# Patient Record
Sex: MALE | Race: WHITE | NOT HISPANIC OR LATINO | Employment: OTHER | URBAN - METROPOLITAN AREA
[De-identification: names, ages, dates, MRNs, and addresses within clinical notes are randomized per-mention and may not be internally consistent; named-entity substitution may affect disease eponyms.]

---

## 2017-01-05 ENCOUNTER — OFFICE VISIT (OUTPATIENT)
Dept: FAMILY MEDICINE CLINIC | Facility: CLINIC | Age: 72
End: 2017-01-05

## 2017-01-05 VITALS
DIASTOLIC BLOOD PRESSURE: 62 MMHG | WEIGHT: 194 LBS | BODY MASS INDEX: 29.4 KG/M2 | TEMPERATURE: 97.6 F | SYSTOLIC BLOOD PRESSURE: 138 MMHG | HEIGHT: 68 IN | OXYGEN SATURATION: 93 % | HEART RATE: 59 BPM | RESPIRATION RATE: 16 BRPM

## 2017-01-05 DIAGNOSIS — E78.2 MIXED HYPERLIPIDEMIA: ICD-10-CM

## 2017-01-05 DIAGNOSIS — R39.15 URGENCY OF URINATION: Primary | ICD-10-CM

## 2017-01-05 DIAGNOSIS — Z12.5 SCREENING FOR PROSTATE CANCER: ICD-10-CM

## 2017-01-05 DIAGNOSIS — M51.37 DDD (DEGENERATIVE DISC DISEASE), LUMBOSACRAL: ICD-10-CM

## 2017-01-05 PROCEDURE — 99214 OFFICE O/P EST MOD 30 MIN: CPT | Performed by: FAMILY MEDICINE

## 2017-01-05 RX ORDER — HYDROCODONE BITARTRATE AND ACETAMINOPHEN 5; 300 MG/1; MG/1
1 TABLET ORAL 2 TIMES DAILY
Qty: 60 TABLET | Refills: 0 | Status: SHIPPED | OUTPATIENT
Start: 2017-01-05 | End: 2017-02-07 | Stop reason: SDUPTHER

## 2017-01-05 RX ORDER — ROSUVASTATIN CALCIUM 10 MG/1
10 TABLET, COATED ORAL DAILY
Qty: 90 TABLET | Refills: 0 | Status: SHIPPED | OUTPATIENT
Start: 2017-01-05 | End: 2017-04-07 | Stop reason: SDUPTHER

## 2017-01-05 NOTE — MR AVS SNAPSHOT
Madhu Santoro   1/5/2017 8:00 AM   Office Visit    Provider:  Damian Chen MD   Department:  [facility]   Dept Phone:  [phone]                Your Full Care Plan              Where to Get Your Medications      You can get these medications from any pharmacy     Bring a paper prescription for each of these medications     Hydrocodone-Acetaminophen 5-300 MG per tablet    rosuvastatin 10 MG tablet            Your Updated Medication List          This list is accurate as of: 1/5/17  8:41 AM.  Always use your most recent med list.                amLODIPine 5 MG tablet   Commonly known as:  NORVASC   Take 1 tablet by mouth Daily.       aspirin 81 MG tablet       clopidogrel 75 MG tablet   Commonly known as:  PLAVIX   Take 1 tablet by mouth Daily.       Hydrocodone-Acetaminophen 5-300 MG per tablet   Commonly known as:  VICODIN   Take 1 tablet by mouth 2 (Two) Times a Day.       nitroglycerin 0.4 MG SL tablet   Commonly known as:  NITROSTAT   Place 1 tablet (0.4 mg total) under the tongue every 5 (five) minutes as needed for chest pain. Take no more than 3 doses in 15 minutes.       omeprazole 20 MG capsule   Commonly known as:  priLOSEC   Take 1 capsule by mouth Daily.       ramipril 5 MG capsule   Commonly known as:  ALTACE   Take 1 capsule by mouth Daily.       rosuvastatin 10 MG tablet   Commonly known as:  CRESTOR   Take 1 tablet by mouth Daily.               We Performed the Following     Ambulatory Referral to Urology     Urinalysis With Microscopic     Urine Culture       You Were Diagnosed With        Codes Comments    Urgency of urination    -  Primary ICD-10-CM: R39.15  ICD-9-CM: 788.63     DDD (degenerative disc disease), lumbosacral     ICD-10-CM: M51.37  ICD-9-CM: 722.52     Mixed hyperlipidemia     ICD-10-CM: E78.2  ICD-9-CM: 272.2     Screening for prostate cancer     ICD-10-CM: Z12.5  ICD-9-CM: V76.44       Instructions     Patients must be  "seen every 3 months for their presription medication review and refill required by [place] law.  Patient has been advised on the potential for addiction  and dependence to their prescribed scheduled medication.  RUTH was obtained today and reviewed. This was scanned into the patient's chart.  I WILL CALL U/A ALVARO     Patient Instructions History      [employer] Signup     [facility] allows you to send messages to your doctor, view your test results, renew your prescriptions, schedule appointments, and more. To sign up, go to [employer] and click on the Sign Up Now link in the New User? box. Enter your [employer] Activation Code exactly as it appears below along with the last four digits of your Social Security Number and your Date of Birth () to complete the sign-up process. If you do not sign up before the expiration date, you must request a new code.    [employer] [facility] Activation Code: [email or web address]  Expires: 2017  8:40 AM    If you have questions, you can email [email or web address] or call [phone] to talk to our [employer] staff. Remember, [employer] is NOT to be used for urgent needs. For medical emergencies, dial 911.               Other Info from Your Visit           Your Appointments     2017  8:00 AM EDT   Office Visit with Damian Chen MD   [facility] (--)    [address] [place] [id number] [phone]           Arrive 15 minutes prior to appointment.              Allergies     Penicillins        Reason for Visit     Arthritis     Hyperlipidemia     Urinary urgency           Vital Signs     Blood Pressure Pulse Temperature Respirations Height Weight    138/62 59 97.6 °F (36.4 °C) (Oral) 16 68\" (172.7 cm) 194 lb (88 kg)    Oxygen Saturation Body Mass Index Smoking Status             93% 29.5 kg/m2 Former Smoker         Problems and Diagnoses Noted     DDD (degenerative disc disease), lumbosacral    High " cholesterol or triglycerides    Screening for prostate cancer    Urgency of urination    -  Primary

## 2017-01-05 NOTE — PATIENT INSTRUCTIONS
Patients must be seen every 3 months for their presription medication review and refill required by [place] law.  Patient has been advised on the potential for addiction  and dependence to their prescribed scheduled medication.  RUTH was obtained today and reviewed. This was scanned into the patient's chart.  I WILL CALL U/A RFESULTS

## 2017-01-05 NOTE — PROGRESS NOTES
Subjective   Madhu Santoro is a 71 y.o. male.     History of Present Illness   Chief Complaint:   Chief Complaint   Patient presents with   • Arthritis   • Hyperlipidemia   • Urinary urgency       Madhu Santoro 71 y.o. male who presents today for Medical Management of the below listed issues and medication refills. I reviewed his lab results. He requests a referral to a Urologist for urinary urgency. The patient has read and signed the [facility] Controlled Substance Contract.  I will continue to see patient for regular follow up appointments.  They are well controlled on their medication.  RUTH has been reviewed by me and is updated every 3 months. No dysuria or frequency   Only urgency. The patient is aware of the potential for addiction and dependence.    he has a history of   Patient Active Problem List   Diagnosis   • Anxiety   • Arthritis   • CAD (coronary artery disease)   • HLD (hyperlipidemia)   • Benign essential hypertension   • DDD (degenerative disc disease), lumbosacral   • Stroke   • Screening for prostate cancer   • Hypertension   • Gastroesophageal reflux disease   • Hyperlipidemia   .  Since the last visit, he has overall felt well.  he has been compliant with   Current Outpatient Prescriptions:   •  amLODIPine (NORVASC) 5 MG tablet, Take 1 tablet by mouth Daily., Disp: 90 tablet, Rfl: 1  •  aspirin 81 MG tablet, Take 81 mg by mouth daily., Disp: , Rfl:   •  clopidogrel (PLAVIX) 75 MG tablet, Take 1 tablet by mouth Daily., Disp: 90 tablet, Rfl: 1  •  Hydrocodone-Acetaminophen (VICODIN) 5-300 MG per tablet, Take 1 tablet by mouth 2 (Two) Times a Day., Disp: 60 tablet, Rfl: 0  •  nitroglycerin (NITROSTAT) 0.4 MG SL tablet, Place 1 tablet (0.4 mg total) under the tongue every 5 (five) minutes as needed for chest pain. Take no more than 3 doses in 15 minutes., Disp: 20 tablet, Rfl: 1  •  omeprazole (PriLOSEC) 20 MG capsule, Take 1 capsule by mouth Daily., Disp: 90 capsule, Rfl: 1  •  ramipril  "(ALTACE) 5 MG capsule, Take 1 capsule by mouth Daily., Disp: 90 capsule, Rfl: 1  •  rosuvastatin (CRESTOR) 10 MG tablet, Take 1 tablet by mouth Daily., Disp: 90 tablet, Rfl: 1.  he denies medication side effects.    All of the chronic condition(s) listed above are stable w/o issues.    Visit Vitals   • /96   • Pulse 59   • Temp 97.6 °F (36.4 °C) (Oral)   • Resp 16   • Ht 68\" (172.7 cm)   • Wt 194 lb (88 kg)   • SpO2 93%   • BMI 29.5 kg/m2       Results for orders placed or performed in visit on 12/05/16   Comprehensive metabolic panel   Result Value Ref Range    Glucose 96 65 - 99 mg/dL    BUN 17 8 - 23 mg/dL    Creatinine 0.98 0.76 - 1.27 mg/dL    eGFR Non African Am 75 >60 mL/min/1.73    eGFR African Am 91 >60 mL/min/1.73    BUN/Creatinine Ratio 17.3 7.0 - 25.0    Sodium 141 136 - 145 mmol/L    Potassium 5.3 (H) 3.5 - 5.2 mmol/L    Chloride 101 98 - 107 mmol/L    Total CO2 28.2 22.0 - 29.0 mmol/L    Calcium 10.0 8.6 - 10.5 mg/dL    Total Protein 7.1 6.0 - 8.5 g/dL    Albumin 4.20 3.50 - 5.20 g/dL    Globulin 2.9 gm/dL    A/G Ratio 1.4 g/dL    Total Bilirubin 0.8 0.1 - 1.2 mg/dL    Alkaline Phosphatase 118 (H) 39 - 117 U/L    AST (SGOT) 28 1 - 40 U/L    ALT (SGPT) 23 1 - 41 U/L   Lipid panel   Result Value Ref Range    Total Cholesterol 189 0 - 200 mg/dL    Triglycerides 87 0 - 150 mg/dL    HDL Cholesterol 61 (H) 40 - 60 mg/dL    VLDL Cholesterol 17.4 5 - 40 mg/dL    LDL Cholesterol  111 (H) 0 - 100 mg/dL   TSH   Result Value Ref Range    TSH 2.790 0.270 - 4.200 mIU/mL   PSA   Result Value Ref Range    PSA 0.496 0.000 - 4.000 ng/mL   CBC and Differential   Result Value Ref Range    WBC 5.35 4.50 - 10.70 10*3/mm3    RBC 5.21 4.60 - 6.00 10*6/mm3    Hemoglobin 16.2 13.7 - 17.6 g/dL    Hematocrit 50.2 40.4 - 52.2 %    MCV 96.4 (H) 79.8 - 96.2 fL    MCH 31.1 27.0 - 32.7 pg    MCHC 32.3 (L) 32.6 - 36.4 g/dL    RDW 13.3 11.5 - 14.5 %    Platelets 376 140 - 500 10*3/mm3    Neutrophil Rel % 58.1 42.7 - 76.0 %    " Lymphocyte Rel % 28.2 19.6 - 45.3 %    Monocyte Rel % 10.5 5.0 - 12.0 %    Eosinophil Rel % 2.1 0.3 - 6.2 %    Basophil Rel % 0.4 0.0 - 1.5 %    Neutrophils Absolute 3.11 1.90 - 8.10 10*3/mm3    Lymphocytes Absolute 1.51 0.90 - 4.80 10*3/mm3    Monocytes Absolute 0.56 0.20 - 1.20 10*3/mm3    Eosinophils Absolute 0.11 0.00 - 0.70 10*3/mm3    Basophils Absolute 0.02 0.00 - 0.20 10*3/mm3    Immature Granulocyte Rel % 0.7 (H) 0.0 - 0.5 %    Immature Grans Absolute 0.04 (H) 0.00 - 0.03 10*3/mm3         The following portions of the patient's history were reviewed and updated as appropriate: allergies, current medications, past family history, past medical history, past social history, past surgical history and problem list.    Review of Systems   Constitutional: Negative for activity change, appetite change and unexpected weight change.   Eyes: Negative for visual disturbance.   Respiratory: Negative for chest tightness and shortness of breath.    Cardiovascular: Negative for chest pain and palpitations.   Genitourinary: Positive for urgency.   Skin: Negative for color change.   Neurological: Negative for syncope and speech difficulty.   Psychiatric/Behavioral: Negative for confusion and decreased concentration.       Objective   Physical Exam   Constitutional: He is oriented to person, place, and time. He appears well-developed and well-nourished.   HENT:   Head: Normocephalic and atraumatic.   Eyes: EOM are normal. Pupils are equal, round, and reactive to light.   Neck: Normal range of motion. Neck supple. No thyromegaly present.   Cardiovascular: Normal rate and regular rhythm.    Pulmonary/Chest: Effort normal and breath sounds normal.   Abdominal: Soft.   Genitourinary: Prostate normal.   Musculoskeletal:   DDDL/S SPINE    Lymphadenopathy:     He has no cervical adenopathy.   Neurological: He is alert and oriented to person, place, and time.   Skin: No rash noted.   Psychiatric: He has a normal mood and affect. His  behavior is normal.   Nursing note and vitals reviewed.      Assessment/Plan   Madhu was seen today for arthritis, hyperlipidemia and urinary urgency.    Diagnoses and all orders for this visit:    Urgency of urination  -     Ambulatory Referral to Urology  -     Urine Culture  -     Urinalysis With Microscopic    DDD (degenerative disc disease), lumbosacral  -     Hydrocodone-Acetaminophen (VICODIN) 5-300 MG per tablet; Take 1 tablet by mouth 2 (Two) Times a Day.    Mixed hyperlipidemia  -     rosuvastatin (CRESTOR) 10 MG tablet; Take 1 tablet by mouth Daily.    Screening for prostate cancer

## 2017-01-07 LAB
APPEARANCE UR: CLEAR
BACTERIA #/AREA URNS HPF: ABNORMAL /HPF
BACTERIA UR CULT: NO GROWTH
BACTERIA UR CULT: NORMAL
BILIRUB UR QL STRIP: (no result)
CASTS URNS MICRO: ABNORMAL
COLOR UR: YELLOW
EPI CELLS #/AREA URNS HPF: ABNORMAL /HPF
GLUCOSE UR QL: (no result)
HGB UR QL STRIP: (no result)
KETONES UR QL STRIP: (no result)
LEUKOCYTE ESTERASE UR QL STRIP: (no result)
NITRITE UR QL STRIP: (no result)
PH UR STRIP: 5.5 [PH] (ref 5–8)
PROT UR QL STRIP: (no result)
RBC #/AREA URNS HPF: ABNORMAL /HPF
SP GR UR: 1.02 (ref 1–1.03)
UROBILINOGEN UR STRIP-MCNC: (no result) MG/DL
WBC #/AREA URNS HPF: ABNORMAL /HPF

## 2017-01-23 DIAGNOSIS — I25.83 CORONARY ARTERY DISEASE DUE TO LIPID RICH PLAQUE: ICD-10-CM

## 2017-01-23 DIAGNOSIS — I25.10 CORONARY ARTERY DISEASE DUE TO LIPID RICH PLAQUE: ICD-10-CM

## 2017-01-23 RX ORDER — NITROGLYCERIN 0.4 MG/1
0.4 TABLET SUBLINGUAL
Qty: 20 TABLET | Refills: 1 | Status: SHIPPED | OUTPATIENT
Start: 2017-01-23 | End: 2017-11-21 | Stop reason: SDUPTHER

## 2017-02-07 DIAGNOSIS — M51.37 DDD (DEGENERATIVE DISC DISEASE), LUMBOSACRAL: ICD-10-CM

## 2017-02-07 RX ORDER — HYDROCODONE BITARTRATE AND ACETAMINOPHEN 5; 300 MG/1; MG/1
1 TABLET ORAL 2 TIMES DAILY
Qty: 60 TABLET | Refills: 0 | Status: SHIPPED | OUTPATIENT
Start: 2017-02-07 | End: 2017-03-07 | Stop reason: SDUPTHER

## 2017-03-07 DIAGNOSIS — M51.37 DDD (DEGENERATIVE DISC DISEASE), LUMBOSACRAL: ICD-10-CM

## 2017-03-07 RX ORDER — HYDROCODONE BITARTRATE AND ACETAMINOPHEN 5; 300 MG/1; MG/1
1 TABLET ORAL 2 TIMES DAILY
Qty: 60 TABLET | Refills: 0 | Status: SHIPPED | OUTPATIENT
Start: 2017-03-07 | End: 2017-04-07 | Stop reason: SDUPTHER

## 2017-04-07 ENCOUNTER — OFFICE VISIT (OUTPATIENT)
Dept: FAMILY MEDICINE CLINIC | Facility: CLINIC | Age: 72
End: 2017-04-07

## 2017-04-07 ENCOUNTER — APPOINTMENT (OUTPATIENT)
Dept: CT IMAGING | Facility: HOSPITAL | Age: 72
End: 2017-04-07

## 2017-04-07 ENCOUNTER — HOSPITAL ENCOUNTER (INPATIENT)
Facility: HOSPITAL | Age: 72
LOS: 12 days | Discharge: HOME HEALTH | End: 2017-04-19
Attending: EMERGENCY MEDICINE | Admitting: INTERNAL MEDICINE

## 2017-04-07 ENCOUNTER — APPOINTMENT (OUTPATIENT)
Dept: GENERAL RADIOLOGY | Facility: HOSPITAL | Age: 72
End: 2017-04-07

## 2017-04-07 VITALS
OXYGEN SATURATION: 98 % | RESPIRATION RATE: 16 BRPM | SYSTOLIC BLOOD PRESSURE: 112 MMHG | TEMPERATURE: 97.4 F | BODY MASS INDEX: 28.19 KG/M2 | WEIGHT: 186 LBS | DIASTOLIC BLOOD PRESSURE: 78 MMHG | HEART RATE: 80 BPM | HEIGHT: 68 IN

## 2017-04-07 DIAGNOSIS — I25.10 CORONARY ARTERY DISEASE INVOLVING NATIVE CORONARY ARTERY OF NATIVE HEART WITHOUT ANGINA PECTORIS: ICD-10-CM

## 2017-04-07 DIAGNOSIS — I10 BENIGN ESSENTIAL HYPERTENSION: ICD-10-CM

## 2017-04-07 DIAGNOSIS — I48.92 NEW ONSET ATRIAL FLUTTER (HCC): Primary | ICD-10-CM

## 2017-04-07 DIAGNOSIS — E78.2 MIXED HYPERLIPIDEMIA: ICD-10-CM

## 2017-04-07 DIAGNOSIS — I25.10 CORONARY ARTERY DISEASE DUE TO LIPID RICH PLAQUE: ICD-10-CM

## 2017-04-07 DIAGNOSIS — I25.83 CORONARY ARTERY DISEASE DUE TO LIPID RICH PLAQUE: ICD-10-CM

## 2017-04-07 DIAGNOSIS — K21.9 GASTROESOPHAGEAL REFLUX DISEASE, ESOPHAGITIS PRESENCE NOT SPECIFIED: ICD-10-CM

## 2017-04-07 DIAGNOSIS — M51.37 DDD (DEGENERATIVE DISC DISEASE), LUMBOSACRAL: ICD-10-CM

## 2017-04-07 DIAGNOSIS — R53.1 GENERALIZED WEAKNESS: ICD-10-CM

## 2017-04-07 DIAGNOSIS — Z95.1 S/P CABG X 3: ICD-10-CM

## 2017-04-07 DIAGNOSIS — R07.9 CHEST PAIN WITH HIGH RISK FOR CARDIAC ETIOLOGY: Primary | ICD-10-CM

## 2017-04-07 LAB
ALBUMIN SERPL-MCNC: 4.2 G/DL (ref 3.5–5.2)
ALBUMIN/GLOB SERPL: 1.2 G/DL
ALP SERPL-CCNC: 119 U/L (ref 39–117)
ALT SERPL W P-5'-P-CCNC: 26 U/L (ref 1–41)
ANION GAP SERPL CALCULATED.3IONS-SCNC: 14.4 MMOL/L
AST SERPL-CCNC: 28 U/L (ref 1–40)
BASOPHILS # BLD AUTO: 0.03 10*3/MM3 (ref 0–0.2)
BASOPHILS NFR BLD AUTO: 0.4 % (ref 0–1.5)
BH CV XLRA MEAS - DIST GSV CALF DIST LEFT: 0.18 CM
BH CV XLRA MEAS - DIST GSV CALF DIST RIGHT: 0.21 CM
BH CV XLRA MEAS - DIST GSV THIGH DIST LEFT: 0.25 CM
BH CV XLRA MEAS - DIST GSV THIGH DIST RIGHT: 0.2 CM
BH CV XLRA MEAS - GSV KNEE DIST LEFT: 0.21 CM
BH CV XLRA MEAS - GSV KNEE DIST RIGHT: 0.23 CM
BH CV XLRA MEAS - GSV ORIGIN DIST LEFT: 0.36 CM
BH CV XLRA MEAS - GSV ORIGIN DIST RIGHT: 0.31 CM
BH CV XLRA MEAS - MID GSV CALF LEFT: 0.14 CM
BH CV XLRA MEAS - MID GSV CALF RIGHT: 0.17 CM
BH CV XLRA MEAS - MID GSV THIGH  LEFT: 0.25 CM
BH CV XLRA MEAS - MID GSV THIGH  RIGHT: 0.2 CM
BH CV XLRA MEAS - PROX GSV CALF DIST LEFT: 0.18 CM
BH CV XLRA MEAS - PROX GSV CALF DIST RIGHT: 0.19 CM
BH CV XLRA MEAS - PROX GSV THIGH  LEFT: 0.24 CM
BH CV XLRA MEAS - PROX GSV THIGH  RIGHT: 0.24 CM
BILIRUB SERPL-MCNC: 0.6 MG/DL (ref 0.1–1.2)
BUN BLD-MCNC: 16 MG/DL (ref 8–23)
BUN/CREAT SERPL: 16.2 (ref 7–25)
CALCIUM SPEC-SCNC: 10 MG/DL (ref 8.6–10.5)
CHLORIDE SERPL-SCNC: 101 MMOL/L (ref 98–107)
CO2 SERPL-SCNC: 25.6 MMOL/L (ref 22–29)
CREAT BLD-MCNC: 0.99 MG/DL (ref 0.76–1.27)
DEPRECATED RDW RBC AUTO: 47.2 FL (ref 37–54)
EOSINOPHIL # BLD AUTO: 0.06 10*3/MM3 (ref 0–0.7)
EOSINOPHIL NFR BLD AUTO: 0.8 % (ref 0.3–6.2)
ERYTHROCYTE [DISTWIDTH] IN BLOOD BY AUTOMATED COUNT: 13.5 % (ref 11.5–14.5)
GFR SERPL CREATININE-BSD FRML MDRD: 74 ML/MIN/1.73
GLOBULIN UR ELPH-MCNC: 3.6 GM/DL
GLUCOSE BLD-MCNC: 109 MG/DL (ref 65–99)
HCT VFR BLD AUTO: 51.5 % (ref 40.4–52.2)
HGB BLD-MCNC: 17.3 G/DL (ref 13.7–17.6)
HOLD SPECIMEN: NORMAL
HOLD SPECIMEN: NORMAL
IMM GRANULOCYTES # BLD: 0.05 10*3/MM3 (ref 0–0.03)
IMM GRANULOCYTES NFR BLD: 0.7 % (ref 0–0.5)
LYMPHOCYTES # BLD AUTO: 1.75 10*3/MM3 (ref 0.9–4.8)
LYMPHOCYTES NFR BLD AUTO: 24.6 % (ref 19.6–45.3)
MAGNESIUM SERPL-MCNC: 2.1 MG/DL (ref 1.6–2.4)
MCH RBC QN AUTO: 32.2 PG (ref 27–32.7)
MCHC RBC AUTO-ENTMCNC: 33.6 G/DL (ref 32.6–36.4)
MCV RBC AUTO: 95.7 FL (ref 79.8–96.2)
MONOCYTES # BLD AUTO: 0.51 10*3/MM3 (ref 0.2–1.2)
MONOCYTES NFR BLD AUTO: 7.2 % (ref 5–12)
NEUTROPHILS # BLD AUTO: 4.71 10*3/MM3 (ref 1.9–8.1)
NEUTROPHILS NFR BLD AUTO: 66.3 % (ref 42.7–76)
PLATELET # BLD AUTO: 421 10*3/MM3 (ref 140–500)
PMV BLD AUTO: 10.4 FL (ref 6–12)
POTASSIUM BLD-SCNC: 4.5 MMOL/L (ref 3.5–5.2)
PROT SERPL-MCNC: 7.8 G/DL (ref 6–8.5)
RBC # BLD AUTO: 5.38 10*6/MM3 (ref 4.6–6)
SODIUM BLD-SCNC: 141 MMOL/L (ref 136–145)
T4 SERPL-MCNC: 8.35 MCG/DL (ref 4.5–11.7)
TROPONIN T SERPL-MCNC: <0.01 NG/ML (ref 0–0.03)
TROPONIN T SERPL-MCNC: <0.01 NG/ML (ref 0–0.03)
TSH SERPL DL<=0.05 MIU/L-ACNC: 3.8 MIU/ML (ref 0.27–4.2)
WBC NRBC COR # BLD: 7.11 10*3/MM3 (ref 4.5–10.7)
WHOLE BLOOD HOLD SPECIMEN: NORMAL
WHOLE BLOOD HOLD SPECIMEN: NORMAL

## 2017-04-07 PROCEDURE — 93005 ELECTROCARDIOGRAM TRACING: CPT | Performed by: EMERGENCY MEDICINE

## 2017-04-07 PROCEDURE — 93000 ELECTROCARDIOGRAM COMPLETE: CPT | Performed by: FAMILY MEDICINE

## 2017-04-07 PROCEDURE — 99223 1ST HOSP IP/OBS HIGH 75: CPT | Performed by: INTERNAL MEDICINE

## 2017-04-07 PROCEDURE — 25010000002 DIGOXIN PER 500 MCG: Performed by: INTERNAL MEDICINE

## 2017-04-07 PROCEDURE — 85025 COMPLETE CBC W/AUTO DIFF WBC: CPT | Performed by: EMERGENCY MEDICINE

## 2017-04-07 PROCEDURE — 25010000002 ENOXAPARIN PER 10 MG: Performed by: EMERGENCY MEDICINE

## 2017-04-07 PROCEDURE — 84436 ASSAY OF TOTAL THYROXINE: CPT | Performed by: EMERGENCY MEDICINE

## 2017-04-07 PROCEDURE — 83735 ASSAY OF MAGNESIUM: CPT | Performed by: EMERGENCY MEDICINE

## 2017-04-07 PROCEDURE — 80053 COMPREHEN METABOLIC PANEL: CPT | Performed by: EMERGENCY MEDICINE

## 2017-04-07 PROCEDURE — 84484 ASSAY OF TROPONIN QUANT: CPT | Performed by: EMERGENCY MEDICINE

## 2017-04-07 PROCEDURE — 84484 ASSAY OF TROPONIN QUANT: CPT | Performed by: INTERNAL MEDICINE

## 2017-04-07 PROCEDURE — 99284 EMERGENCY DEPT VISIT MOD MDM: CPT

## 2017-04-07 PROCEDURE — 84443 ASSAY THYROID STIM HORMONE: CPT | Performed by: EMERGENCY MEDICINE

## 2017-04-07 PROCEDURE — 0 IOPAMIDOL PER 1 ML: Performed by: INTERNAL MEDICINE

## 2017-04-07 PROCEDURE — 71010 HC CHEST PA OR AP: CPT

## 2017-04-07 PROCEDURE — 99215 OFFICE O/P EST HI 40 MIN: CPT | Performed by: FAMILY MEDICINE

## 2017-04-07 PROCEDURE — 93010 ELECTROCARDIOGRAM REPORT: CPT | Performed by: INTERNAL MEDICINE

## 2017-04-07 PROCEDURE — 71275 CT ANGIOGRAPHY CHEST: CPT

## 2017-04-07 RX ORDER — ALPRAZOLAM 0.25 MG/1
0.25 TABLET ORAL 4 TIMES DAILY PRN
Status: DISCONTINUED | OUTPATIENT
Start: 2017-04-07 | End: 2017-04-11

## 2017-04-07 RX ORDER — OMEPRAZOLE 20 MG/1
20 CAPSULE, DELAYED RELEASE ORAL DAILY
Qty: 90 CAPSULE | Refills: 1 | Status: SHIPPED | OUTPATIENT
Start: 2017-04-07 | End: 2017-08-10 | Stop reason: SDUPTHER

## 2017-04-07 RX ORDER — ROSUVASTATIN CALCIUM 10 MG/1
10 TABLET, COATED ORAL DAILY
Qty: 90 TABLET | Refills: 1 | Status: SHIPPED | OUTPATIENT
Start: 2017-04-07 | End: 2017-08-10 | Stop reason: SDUPTHER

## 2017-04-07 RX ORDER — PANTOPRAZOLE SODIUM 40 MG/1
40 TABLET, DELAYED RELEASE ORAL
Status: DISCONTINUED | OUTPATIENT
Start: 2017-04-08 | End: 2017-04-11

## 2017-04-07 RX ORDER — DIGOXIN 0.25 MG/ML
250 INJECTION INTRAMUSCULAR; INTRAVENOUS ONCE
Status: DISCONTINUED | OUTPATIENT
Start: 2017-04-07 | End: 2017-04-10

## 2017-04-07 RX ORDER — ONDANSETRON 2 MG/ML
4 INJECTION INTRAMUSCULAR; INTRAVENOUS EVERY 6 HOURS PRN
Status: DISCONTINUED | OUTPATIENT
Start: 2017-04-07 | End: 2017-04-11

## 2017-04-07 RX ORDER — AMLODIPINE BESYLATE 5 MG/1
5 TABLET ORAL DAILY
Qty: 90 TABLET | Refills: 1 | Status: SHIPPED | OUTPATIENT
Start: 2017-04-07 | End: 2017-04-19 | Stop reason: HOSPADM

## 2017-04-07 RX ORDER — NITROGLYCERIN 0.4 MG/1
0.4 TABLET SUBLINGUAL
Status: DISCONTINUED | OUTPATIENT
Start: 2017-04-07 | End: 2017-04-07

## 2017-04-07 RX ORDER — NITROGLYCERIN 0.4 MG/1
0.4 TABLET SUBLINGUAL
Status: DISCONTINUED | OUTPATIENT
Start: 2017-04-07 | End: 2017-04-11

## 2017-04-07 RX ORDER — HYDROCODONE BITARTRATE AND ACETAMINOPHEN 5; 300 MG/1; MG/1
1 TABLET ORAL 2 TIMES DAILY
Qty: 60 TABLET | Refills: 0 | Status: SHIPPED | OUTPATIENT
Start: 2017-04-07 | End: 2017-04-26

## 2017-04-07 RX ORDER — RAMIPRIL 5 MG/1
5 CAPSULE ORAL DAILY
Qty: 90 CAPSULE | Refills: 1 | Status: SHIPPED | OUTPATIENT
Start: 2017-04-07 | End: 2017-04-26 | Stop reason: SDUPTHER

## 2017-04-07 RX ORDER — DILTIAZEM HYDROCHLORIDE 5 MG/ML
10 INJECTION INTRAVENOUS ONCE
Status: DISCONTINUED | OUTPATIENT
Start: 2017-04-07 | End: 2017-04-10

## 2017-04-07 RX ORDER — ZOLPIDEM TARTRATE 5 MG/1
5 TABLET ORAL NIGHTLY PRN
Status: DISCONTINUED | OUTPATIENT
Start: 2017-04-07 | End: 2017-04-11

## 2017-04-07 RX ORDER — CLOPIDOGREL BISULFATE 75 MG/1
75 TABLET ORAL DAILY
Qty: 90 TABLET | Refills: 1 | Status: SHIPPED | OUTPATIENT
Start: 2017-04-07 | End: 2017-04-19 | Stop reason: HOSPADM

## 2017-04-07 RX ORDER — MORPHINE SULFATE 2 MG/ML
2 INJECTION, SOLUTION INTRAMUSCULAR; INTRAVENOUS
Status: DISCONTINUED | OUTPATIENT
Start: 2017-04-07 | End: 2017-04-11

## 2017-04-07 RX ORDER — RAMIPRIL 5 MG/1
5 CAPSULE ORAL DAILY
Status: DISCONTINUED | OUTPATIENT
Start: 2017-04-07 | End: 2017-04-10

## 2017-04-07 RX ORDER — ACETAMINOPHEN 325 MG/1
650 TABLET ORAL EVERY 6 HOURS PRN
Status: DISCONTINUED | OUTPATIENT
Start: 2017-04-07 | End: 2017-04-11

## 2017-04-07 RX ORDER — SODIUM CHLORIDE 0.9 % (FLUSH) 0.9 %
10 SYRINGE (ML) INJECTION AS NEEDED
Status: DISCONTINUED | OUTPATIENT
Start: 2017-04-07 | End: 2017-04-11

## 2017-04-07 RX ORDER — SODIUM CHLORIDE 0.9 % (FLUSH) 0.9 %
1-10 SYRINGE (ML) INJECTION AS NEEDED
Status: DISCONTINUED | OUTPATIENT
Start: 2017-04-07 | End: 2017-04-11

## 2017-04-07 RX ORDER — AMLODIPINE BESYLATE 5 MG/1
5 TABLET ORAL DAILY
Status: DISCONTINUED | OUTPATIENT
Start: 2017-04-07 | End: 2017-04-11

## 2017-04-07 RX ORDER — DIGOXIN 0.25 MG/ML
500 INJECTION INTRAMUSCULAR; INTRAVENOUS ONCE
Status: COMPLETED | OUTPATIENT
Start: 2017-04-07 | End: 2017-04-07

## 2017-04-07 RX ORDER — ATORVASTATIN CALCIUM 40 MG/1
40 TABLET, FILM COATED ORAL DAILY
Status: DISCONTINUED | OUTPATIENT
Start: 2017-04-07 | End: 2017-04-11

## 2017-04-07 RX ORDER — HYDROCODONE BITARTRATE AND ACETAMINOPHEN 10; 325 MG/1; MG/1
1 TABLET ORAL 2 TIMES DAILY
Status: DISCONTINUED | OUTPATIENT
Start: 2017-04-07 | End: 2017-04-08

## 2017-04-07 RX ORDER — ASPIRIN 81 MG/1
81 TABLET ORAL ONCE
Status: DISCONTINUED | OUTPATIENT
Start: 2017-04-07 | End: 2017-04-11

## 2017-04-07 RX ADMIN — IOPAMIDOL 90 ML: 755 INJECTION, SOLUTION INTRAVENOUS at 13:38

## 2017-04-07 RX ADMIN — HYDROCODONE BITARTRATE AND ACETAMINOPHEN 1 TABLET: 10; 325 TABLET ORAL at 22:54

## 2017-04-07 RX ADMIN — DILTIAZEM HYDROCHLORIDE 10 MG/HR: 100 INJECTION, POWDER, LYOPHILIZED, FOR SOLUTION INTRAVENOUS at 22:03

## 2017-04-07 RX ADMIN — ENOXAPARIN SODIUM 80 MG: 80 INJECTION SUBCUTANEOUS at 13:56

## 2017-04-07 RX ADMIN — METOPROLOL TARTRATE 25 MG: 25 TABLET ORAL at 20:54

## 2017-04-07 RX ADMIN — DILTIAZEM HYDROCHLORIDE 5 MG/HR: 100 INJECTION, POWDER, LYOPHILIZED, FOR SOLUTION INTRAVENOUS at 10:18

## 2017-04-07 RX ADMIN — DIGOXIN 500 MCG: 0.25 INJECTION INTRAMUSCULAR; INTRAVENOUS at 18:37

## 2017-04-07 RX ADMIN — SODIUM CHLORIDE 1000 ML: 9 INJECTION, SOLUTION INTRAVENOUS at 10:41

## 2017-04-07 RX ADMIN — DILTIAZEM HYDROCHLORIDE 15 MG/HR: 100 INJECTION, POWDER, LYOPHILIZED, FOR SOLUTION INTRAVENOUS at 15:05

## 2017-04-07 NOTE — PLAN OF CARE
Problem: Patient Care Overview (Adult)  Goal: Plan of Care Review  Outcome: Ongoing (interventions implemented as appropriate)    04/07/17 1720   Coping/Psychosocial Response Interventions   Plan Of Care Reviewed With patient;spouse   Patient Care Overview   Progress no change   Outcome Evaluation   Outcome Summary/Follow up Plan pt admitted to the ER with new onset A flutter/A fib. Pt HR ranging from 90-120s. Occasionally will increase into the 140s- low 150s when getting out of bed to use the bathroom. BP stable. pt on RA. no complaints of chest pain. pt has no requests at this time. will continue to monitor.        Goal: Adult Individualization and Mutuality  Outcome: Ongoing (interventions implemented as appropriate)  Goal: Discharge Needs Assessment  Outcome: Ongoing (interventions implemented as appropriate)    Problem: Arrhythmia/Dysrhythmia (Symptomatic) (Adult)  Goal: Signs and Symptoms of Listed Potential Problems Will be Absent or Manageable (Arrhythmia/Dysrhythmia)  Outcome: Ongoing (interventions implemented as appropriate)

## 2017-04-07 NOTE — PATIENT INSTRUCTIONS
Exercise 30 minutes most days of the week  Sleep 6-8 hours each night if possible  Low fat, low cholesterol diet   we discussed prescribed medications and how to take them   make sure you get results of any labs/studies ordered today  Low glycemic index diet    Patients must be seen every 3 months for their presription medication review and refill required by [place] law.  Patient has been advised on the potential for addiction  and dependence to their prescribed scheduled medication.  RUTH was obtained today and reviewed. This was scanned into the patient's chart.transfer to [facility] er per ems now

## 2017-04-07 NOTE — PROGRESS NOTES
Procedure     ECG 12 Lead  Date/Time: 4/7/2017 9:08 AM  Performed by: SUSAN TYLER  Authorized by: SUSAN TYLER   Comparison: not compared with previous ECG   Previous ECG: no previous ECG available  Rhythm: sinus tachycardia  Rate: tachycardic  QRS axis: indeterminate  Clinical impression: abnormal ECG and dysrhythmia - non-specific  Comments: Sinus tach  Rate 160   Abnormal   Sent to er

## 2017-04-07 NOTE — ED PROVIDER NOTES
" EMERGENCY DEPARTMENT ENCOUNTER    CHIEF COMPLAINT  Chief Complaint: CP  History given by: patient, family  History limited by: nothing  Room Number: 10/10  PMD: Damian Chen MD  Cardiologist- Dr. Roger    HPI:  Pt is a 71 y.o. male who presents complaining of central CP four days ago. Pt states that the pain radiated to his back and jaw and it felt like he was being choked. Pt states that he had a HA at that time. Pt states that the pain resolved by itself. Pt states that he developed generalized weakness 3 days ago, which is now resolved. Pt denies abd pain, SOB, nausea, diaphoresis or palpitations. Pt denies any symptoms at this time. Pt states that he went to his PMD's office for a medication refill and was sent to the ED after he had an EKG performed. Pt denies any history of a-fib or a-flutter. Pt is on Plavix for history of TIAs and cartoid artery stenosis.    Duration:  One day  Onset: gradual  Timing: constant  Location: central chest  Radiation: back, jaw  Quality: \"pain\"  Intensity/Severity: moderate  Progression: resolved  Associated Symptoms: generalized weakness  Aggravating Factors: none  Alleviating Factors: none  Previous Episodes: none  Treatment before arrival: Pt was sent to the ED from his PMD's office for further evaluation.    PAST MEDICAL HISTORY  Active Ambulatory Problems     Diagnosis Date Noted   • Anxiety    • Arthritis    • CAD (coronary artery disease)    • HLD (hyperlipidemia)    • Benign essential hypertension    • DDD (degenerative disc disease), lumbosacral    • Stroke    • Screening for prostate cancer 11/20/2012   • Hypertension    • Gastroesophageal reflux disease 04/04/2016   • Hyperlipidemia 04/04/2016     Resolved Ambulatory Problems     Diagnosis Date Noted   • No Resolved Ambulatory Problems     Past Medical History:   Diagnosis Date   • Anxiety    • Arthritis    • Benign essential hypertension    • CAD (coronary artery disease)    • Chest pain    • Colonic polyp    • " DDD (degenerative disc disease), lumbosacral    • H/O bone density study 2013   • H/O complete eye exam 2014   • HLD (hyperlipidemia)    • Hypertension    • Lipid screening 05/31/2013   • Low back pain    • Screening for prostate cancer 07/07/2015   • Stroke        PAST SURGICAL HISTORY  Past Surgical History:   Procedure Laterality Date   • COLONOSCOPY  2010       FAMILY HISTORY  Family History   Problem Relation Age of Onset   • Heart disease Mother    • Heart attack Mother    • Stroke Mother    • Heart disease Father    • Heart attack Father    • Stroke Father    • Arthritis Other    • Diabetes Other    • Hypertension Other    • Kidney disease Other      stones       SOCIAL HISTORY  Social History     Social History   • Marital status:      Spouse name: N/A   • Number of children: N/A   • Years of education: N/A     Occupational History   • Not on file.     Social History Main Topics   • Smoking status: Former Smoker   • Smokeless tobacco: Never Used   • Alcohol use No   • Drug use: No   • Sexual activity: Yes     Partners: Female     Other Topics Concern   • Not on file     Social History Narrative       ALLERGIES  Penicillins    REVIEW OF SYSTEMS  Review of Systems   Constitutional: Negative for activity change, appetite change and fever.   HENT: Negative for congestion and sore throat.    Eyes: Negative.    Respiratory: Negative for cough and shortness of breath.    Cardiovascular: Positive for chest pain. Negative for leg swelling.   Gastrointestinal: Negative for abdominal pain, diarrhea and vomiting.   Endocrine: Negative.    Genitourinary: Negative for decreased urine volume and dysuria.   Musculoskeletal: Negative for neck pain.   Skin: Negative for rash and wound.   Allergic/Immunologic: Negative.    Neurological: Positive for weakness (generalized). Negative for numbness and headaches.   Hematological: Negative.    Psychiatric/Behavioral: Negative.    All other systems reviewed and are  negative.      PHYSICAL EXAM  ED Triage Vitals   Temp Heart Rate Resp BP SpO2   04/07/17 0935 04/07/17 0935 04/07/17 0935 04/07/17 0935 04/07/17 0935   97.5 °F (36.4 °C) 150 16 144/99 96 %      Temp src Heart Rate Source Patient Position BP Location FiO2 (%)   -- 04/07/17 0935 -- -- --    Monitor          Physical Exam   Constitutional: He is oriented to person, place, and time and well-developed, well-nourished, and in no distress.   HENT:   Head: Normocephalic and atraumatic.   Eyes: EOM are normal. Pupils are equal, round, and reactive to light.   Neck: Normal range of motion. Neck supple.   Cardiovascular: Normal heart sounds.  An irregular rhythm present. Tachycardia present.    Pulmonary/Chest: Effort normal and breath sounds normal. No respiratory distress.   Abdominal: Soft. There is no tenderness. There is no rebound and no guarding.   Musculoskeletal: Normal range of motion. He exhibits no edema or tenderness.   Neurological: He is alert and oriented to person, place, and time. He has normal sensation and normal strength.   Skin: Skin is warm and dry.   Psychiatric: Mood and affect normal.   Nursing note and vitals reviewed.      LAB RESULTS  Lab Results (last 24 hours)     Procedure Component Value Units Date/Time    Troponin [[id number]]  (Normal) Collected:  04/07/17 1012    Specimen:  Blood Updated:  04/07/17 1055     Troponin T <0.010 ng/mL     Narrative:       Troponin T Reference Ranges:  Less than 0.03 ng/mL:    Negative for AMI  0.03 to 0.09 ng/mL:      Indeterminant for AMI  Greater than 0.09 ng/mL: Positive for AMI    CBC & Differential [[id number]] Collected:  04/07/17 1012    Specimen:  Blood Updated:  04/07/17 1024    Narrative:       The following orders were created for panel order CBC & Differential.  Procedure                               Abnormality         Status                     ---------                               -----------         ------                     CBC Auto  Differential[[id number]]         Abnormal            Final result                 Please view results for these tests on the individual orders.    Comprehensive Metabolic Panel [[id number]]  (Abnormal) Collected:  04/07/17 1012    Specimen:  Blood Updated:  04/07/17 1049     Glucose 109 (H) mg/dL      BUN 16 mg/dL      Creatinine 0.99 mg/dL      Sodium 141 mmol/L      Potassium 4.5 mmol/L      Chloride 101 mmol/L      CO2 25.6 mmol/L      Calcium 10.0 mg/dL      Total Protein 7.8 g/dL      Albumin 4.20 g/dL      ALT (SGPT) 26 U/L      AST (SGOT) 28 U/L      Alkaline Phosphatase 119 (H) U/L      Total Bilirubin 0.6 mg/dL      eGFR Non African Amer 74 mL/min/1.73      Globulin 3.6 gm/dL      A/G Ratio 1.2 g/dL      BUN/Creatinine Ratio 16.2     Anion Gap 14.4 mmol/L     Narrative:       The MDRD GFR formula is only valid for adults with stable renal function between ages 18 and 70.    TSH [[id number]]  (Normal) Collected:  04/07/17 1012    Specimen:  Blood Updated:  04/07/17 1055     TSH 3.800 mIU/mL     Magnesium [[id number]]  (Normal) Collected:  04/07/17 1012    Specimen:  Blood Updated:  04/07/17 1049     Magnesium 2.1 mg/dL     T4 [[id number]]  (Normal) Collected:  04/07/17 1012    Specimen:  Blood Updated:  04/07/17 1055     T4, Total 8.35 mcg/dL     CBC Auto Differential [[id number]]  (Abnormal) Collected:  04/07/17 1012    Specimen:  Blood Updated:  04/07/17 1024     WBC 7.11 10*3/mm3      RBC 5.38 10*6/mm3      Hemoglobin 17.3 g/dL      Hematocrit 51.5 %      MCV 95.7 fL      MCH 32.2 pg      MCHC 33.6 g/dL      RDW 13.5 %      RDW-SD 47.2 fl      MPV 10.4 fL      Platelets 421 10*3/mm3      Neutrophil % 66.3 %      Lymphocyte % 24.6 %      Monocyte % 7.2 %      Eosinophil % 0.8 %      Basophil % 0.4 %      Immature Grans % 0.7 (H) %      Neutrophils, Absolute 4.71 10*3/mm3      Lymphocytes, Absolute 1.75 10*3/mm3      Monocytes, Absolute 0.51 10*3/mm3      Eosinophils, Absolute 0.06 10*3/mm3      Basophils, Absolute  0.03 10*3/mm3      Immature Grans, Absolute 0.05 (H) 10*3/mm3           I ordered the above labs and reviewed the results    RADIOLOGY  XR Chest 1 View   Final Result      CT Angiogram Chest With Contrast    (Results Pending)      1103- Reviewed pt's CXR, which shows nothing acute. Independently viewed by me. Interpreted by radiologist.    1400- Reviewed pt's CTA Chest, which shows no P.E.. There is pleural thickening on the anteromedial heart border. Independently viewed by me. Interpreted by radiologist.     I ordered the above noted radiological studies. Interpreted by radiologist. Reviewed by me in PACS.       PROCEDURES  Critical Care  Performed by: FERNANDEZ SANCHEZ  Authorized by: FERNANDEZ SANCHEZ   Total critical care time: 32 minutes  Critical care time was exclusive of separately billable procedures and treating other patients.  Critical care was necessary to treat or prevent imminent or life-threatening deterioration of the following conditions: cardiac failure.  Critical care was time spent personally by me on the following activities: development of treatment plan with patient or surrogate, discussions with consultants, interpretation of cardiac output measurements, evaluation of patient's response to treatment, examination of patient, obtaining history from patient or surrogate, ordering and performing treatments and interventions, ordering and review of laboratory studies, ordering and review of radiographic studies, pulse oximetry, re-evaluation of patient's condition and review of old charts.        EKG           EKG time: 0949  Rhythm/Rate: a-flutter, ventricular rate is 150  P waves and MD: 2:1 block  QRS, axis: normal   ST and T waves: non-specific ST and T wave changes.    Interpreted Contemporaneously by me, independently viewed  changed compared to prior 3-16-15 (a-flutter has replaced NSR)    EKG           EKG time: 1058  Rhythm/Rate: a-flutter with variable AV, Ventricular rate of 117  QRS, axis:  LAFB   ST and T waves: non-specific ST and T wave changes)    Interpreted Contemporaneously by me, independently viewed  changed compared to prior (rate and rhythm have changed)     PROGRESS AND CONSULTS  ED Course     1000- Notified pt and spouse that the pt is in a-flutter at this time. I performed a modified Valsalva maneuver w/o improvement of the pt's a-flutter. Discussed the plan to order medication for rate control and that the pt will have to be admitted since he does not have a history of a-flutter. Pt and family agree with the plan and all questions were addressed.     1011- Ordered blood work, T4, magnesium, TSH, Troponin, EKG and CXR for further evaluation. Ordered cardizem for rate control.    1039- Pt's JK=506's. Ordered additional cardizem bolus for rate control and IVF for hydration.    1056- Per RN, the pt converted to a-fib with HR=90's. Ordered repeat EKG for further evaluation.    1104- Rechecked pt. Pt's KK=261p again . Notified pt and family of the pt's unremarkable lab and imaging results. Discussed the plan to admit the pt for further evaluation of the pt's symptoms. Pt and family agree with the plan and all questions were addressed.    1108- Placed call to [facility] for admission.    1128- Discussed the pt's case with Dr. Tello (cardiology) who states that I should give the pt more cardizem and then discharge the pt home.    1132- Placed call to [facility] for admission.    11:34 AM  Latest vital signs   BP- 103/90 HR- (!) 152 Temp- 97.5 °F (36.4 °C) O2 sat- 97%    1135- Rechecked pt. Pt is resting comfortably. Notified pt and family of the consult with Dr. Tello (cardiology) and that I am going to admit the pt to [facility]. Pt and family agree with the plan and all questions were addressed.    1145- Discussed the pt's case with Dr. Wright (cardiology) who agrees to admit the pt to a telemetry bed. He requests that I order lovenox, which I will do.    1158- Ordered lovenox for  anti-coagulation.    1302- Received call from Kat (RN for cardiology) who states that Dr. Wright (cardiology) would like me to order a CTA Chest to r/o P.E..    1302- Ordered CTA Chest to r/o P.E..    MEDICAL DECISION MAKING  Results were reviewed/discussed with the patient and they were also made aware of online access. Pt also made aware that some labs, such as cultures, will not be resulted during ER visit and follow up with PMD is necessary.     MDM  Number of Diagnoses or Management Options  New onset atrial flutter:      Amount and/or Complexity of Data Reviewed  Clinical lab tests: ordered and reviewed (Troponin=<0.010)  Tests in the radiology section of CPT®: ordered and reviewed (CXR shows nothing acute)  Tests in the medicine section of CPT®: ordered and reviewed (See EKG procedure note)  Obtain history from someone other than the patient: yes (spouse)  Discuss the patient with other providers: yes (D/w Dr. Tello (cardiology) and Dr. Wright (cardiology))  Independent visualization of images, tracings, or specimens: yes           DIAGNOSIS  Final diagnoses:   New onset atrial flutter       DISPOSITION  ADMISSION    Discussed treatment plan and reason for admission with pt/family and admitting physician.  Pt/family voiced understanding of the plan for admission for further testing/treatment as needed.     Latest Documented Vital Signs:  As of 11:03 AM  BP- 103/90 HR- (!) 152 Temp- 97.5 °F (36.4 °C) O2 sat- 97%    --  Documentation assistance provided by noé Mccabe for Dr. Shah.  Information recorded by the noé was done at my direction and has been verified and validated by me.     Katie Mccabe  04/07/17 7613       Zach Shah MD  04/07/17 7791

## 2017-04-07 NOTE — H&P
Date of Consultation: 17    Reason for Admit: Atrial Flutter, chest pain    Encounter Provider: Eric Wright MD    Group of Service: [facility]     Patient Name: Madhu Santoro    :1945    Chief complaint:  Shortness of breath, chest pain, new onset typical atrial flutter    History of Present Illness:  The patient is a 71 year old male patient of Dr. Roger with [facility].  He has a history of coronary artery disease  Lcx 30-40%, mRCA 60%, prox LAD 40-50%, ostial LAD 30-40% by cath in , carotid artery disease s/p CEA, stroke (plavix), hypertension, and hyperlipidemia.  His last echocardiogram was in  with an EF of 60-65% and no significant VHD.  He last saw Dr. Roger in the office and 2016 at which time he reported he was doing well, and he denied any chest pain, shortness of breath, palpitations. No changes were made to his medical regimen and he was to follow-up in one year.     The patient states that he days ago, he began to experience chest discomfort in his lower central chest which she described as a pressure type sensation.  This radiated up the chest, into the back, and into his neck and felt as if someone was choking him.  He states that this occurred several times on that day, and once within the last 2 days.  Today, he went to his primary care physician to obtain his narcotic prescription, and was found to have a heart rate of 150.  He was sent to the emergency department where he was found to be in rapid atrial flutter with a rate of 150-160.  He did not have a previous diagnosis of atrial flutter.  His initial troponin was negative.  He was placed on a Cardizem drip.  A CT angiogram of the chest was obtained and a pulmonary embolism was not present.  He is asymptomatic with regards to the atrial flutter in that he does not feel palpitations or rapid heart rate.  He was unaware that he had this.    Past Medical History:   Diagnosis Date   • Anxiety     • Arthritis    • Benign essential hypertension    • CAD (coronary artery disease)    • Chest pain    • Colonic polyp    • DDD (degenerative disc disease), lumbosacral    • H/O bone density study 2013   • H/O complete eye exam 2014   • HLD (hyperlipidemia)    • Hypertension    • Lipid screening 05/31/2013   • Low back pain     physical therapy [facility] 5-12-10   • Screening for prostate cancer 07/07/2015   • Stroke          Past Surgical History:   Procedure Laterality Date   • COLONOSCOPY  2010         Allergies   Allergen Reactions   • Penicillins          No current facility-administered medications on file prior to encounter.      Current Outpatient Prescriptions on File Prior to Encounter   Medication Sig Dispense Refill   • amLODIPine (NORVASC) 5 MG tablet Take 1 tablet by mouth Daily. 90 tablet 1   • aspirin 81 MG tablet Take 81 mg by mouth daily.     • clopidogrel (PLAVIX) 75 MG tablet Take 1 tablet by mouth Daily. 90 tablet 1   • Hydrocodone-Acetaminophen (VICODIN) 5-300 MG per tablet Take 1 tablet by mouth 2 (Two) Times a Day. 60 tablet 0   • nitroglycerin (NITROSTAT) 0.4 MG SL tablet Place 1 tablet under the tongue Every 5 (Five) Minutes As Needed for chest pain. Take no more than 3 doses in 15 minutes. 20 tablet 1   • omeprazole (priLOSEC) 20 MG capsule Take 1 capsule by mouth Daily. 90 capsule 1   • ramipril (ALTACE) 5 MG capsule Take 1 capsule by mouth Daily. 90 capsule 1   • rosuvastatin (CRESTOR) 10 MG tablet Take 1 tablet by mouth Daily. 90 tablet 1   • [DISCONTINUED] amLODIPine (NORVASC) 5 MG tablet Take 1 tablet by mouth Daily. 90 tablet 1   • [DISCONTINUED] clopidogrel (PLAVIX) 75 MG tablet Take 1 tablet by mouth Daily. 90 tablet 1   • [DISCONTINUED] Hydrocodone-Acetaminophen (VICODIN) 5-300 MG per tablet Take 1 tablet by mouth 2 (Two) Times a Day. 60 tablet 0   • [DISCONTINUED] omeprazole (PriLOSEC) 20 MG capsule Take 1 capsule by mouth Daily. 90 capsule 1   • [DISCONTINUED] ramipril  "(ALTACE) 5 MG capsule Take 1 capsule by mouth Daily. 90 capsule 1   • [DISCONTINUED] rosuvastatin (CRESTOR) 10 MG tablet Take 1 tablet by mouth Daily. 90 tablet 0         Social History     Social History   • Marital status:      Spouse name: N/A   • Number of children: N/A   • Years of education: N/A     Occupational History   • Not on file.     Social History Main Topics   • Smoking status: Former Smoker   • Smokeless tobacco: Never Used   • Alcohol use No   • Drug use: No   • Sexual activity: Yes     Partners: Female     Other Topics Concern   • Not on file     Social History Narrative         Family History   Problem Relation Age of Onset   • Heart disease Mother    • Heart attack Mother    • Stroke Mother    • Heart disease Father    • Heart attack Father    • Stroke Father    • Arthritis Other    • Diabetes Other    • Hypertension Other    • Kidney disease Other      stones         REVIEW OF SYSTEMS:   12 point ROS was performed and is negative except as outlined in HPI    REVIEW OF SYSTEMS:   CONSTITUTIONAL: No weight loss, fever, chills.  HEENT: No visual changes or hearing changes.  SKIN: No rash.   RESPIRATORY: No cough or hemoptysis.  GASTROINTESTINAL: No abdominal pain, melena, nausea, or vomiting.  GENITOURINARY: No dysuria or frequency.  NEUROLOGICAL: No numbness or tingling in the extremities.   MUSCULOSKELETAL: No new joint pain.    HEMATOLOGIC: No bleeding or bruising.   LYMPHATICS: No enlarged nodes.   PSYCHIATRIC: No severe depression or anxiety.    ENDOCRINOLOGIC: No heat or cold intolerance.      Objective:     Vitals:    04/07/17 1252 04/07/17 1323 04/07/17 1413 04/07/17 1518   BP: 114/89 135/69 124/72 129/80   BP Location:   Right arm Left arm   Patient Position:   Lying Lying   Pulse: (!) 151 (!) 151 103 105   Resp:   18 20   Temp:   97.4 °F (36.3 °C) 97.5 °F (36.4 °C)   TempSrc:   Oral Oral   SpO2: 96% 94% 95% 98%   Weight:    185 lb (83.9 kg)   Height:    68\" (172.7 cm)     Body mass " "index is 28.13 kg/(m^2).  Flowsheet Rows         First Filed Value    Admission Height  68\" (172.7 cm) Documented at 04/07/2017 0935    Admission Weight  185 lb (83.9 kg) Documented at 04/07/2017 0935           General:    No acute distress, alert and oriented x4, pleasant                   Head:    Normocephalic, atraumatic.   Eyes:          Conjunctivae and sclerae normal, no icterus, PERRLA   Throat:   No oral lesions, no thrush, oral mucosa moist.    Neck:   Supple, trachea midline.   Lungs:     Clear to auscultation bilaterally     Heart:    Regular rhythm, tachycardic rate.  No murmurs.   Abdomen:     Soft, non-tender, non-distended, positive bowel sounds.    Extremities:   No clubbing, cyanosis, or edema.     Pulses:   Pulses palpable and equal bilaterally.    Skin:   No bleeding or rash.   Neuro:   Non-focal.  Moves all extremities well.    Psychiatric:   Normal mood and affect.     Lab Review:                  Results from last 7 days  Lab Units 04/07/17  1012   SODIUM mmol/L 141   POTASSIUM mmol/L 4.5   CHLORIDE mmol/L 101   TOTAL CO2 mmol/L 25.6   BUN mg/dL 16   CREATININE mg/dL 0.99   GLUCOSE mg/dL 109*   CALCIUM mg/dL 10.0       Results from last 7 days  Lab Units 04/07/17  1820 04/07/17  1012   TROPONIN T ng/mL <0.010 <0.010       Results from last 7 days  Lab Units 04/07/17  1012   WBC 10*3/mm3 7.11   HEMOGLOBIN g/dL 17.3   HEMATOCRIT % 51.5   PLATELETS 10*3/mm3 421               Results from last 7 days  Lab Units 04/07/17  1012   MAGNESIUM mg/dL 2.1              Previous:     EKG (reviewed by me personally): Rapid atrial flutter with 2:1 response      Assessment:   1. Typical atrial flutter (new)  2. Chest pain  3. Coronary artery disease  4. Hypertension  5. Hyperlipidemia  6. PVD, s/p CEA     Plan:       As noted, the patient did not have a pulmonary embolism on his CT angiogram of the chest.  He is currently still having rapid atrial flutter with a rate of approximately 150.  The atrial flutter " does appear to be typical in nature.  This is a new diagnosis and was not known previously.  I will continue him on Lovenox for now, and he should be on systemic anticoagulation if at all possible at the time of discharge.  If this cannot be controlled with medications, a cardioversion versus ablation could be considered.  I will continue him on the Cardizem drip, and load him with IV digoxin.  I will also give him a single dose of oral metoprolol at this point.  He takes Plavix for his history of carotid endarterectomy, although I'm going to hold this while he is on Lovenox and aspirin.  He will need an echocardiogram while here, and I will also check thyroid function test tomorrow morning.  His chest discomfort was severe, and this was the first episode that he is having quite some time.  He did have coronary artery disease with up to 60% disease in his mid right coronary artery and 50% in his LAD back in 2011 on a cardiac catheterization.  Based on his presentation and his symptoms, as well as his risk factors for progression, I have recommended a diagnostic left heart catheterization prior to discharge.  I did discuss this in detail with the patient and his wife, and he does wish to proceed.  He will also continue on statin therapy (he was on Crestor as an outpatient).  He will be ruled out for myocardial infarction as well.  Further plans will be dictated on the response of the atrial flutter to medical therapy initially.    Jann Wright M.D.

## 2017-04-07 NOTE — PROGRESS NOTES
Subjective   Madhu Santoro is a 71 y.o. male.     History of Present Illness   Chief Complaint:   Chief Complaint   Patient presents with   • Heartburn   • Hyperlipidemia   • Hypertension   • Coronary Artery Disease       Madhu Santoro 71 y.o. male who presents today for Medical Management of the below listed issues and medication refills. Patient requires vicodin 5mg for DDD l/s spine and does have pain controlled  With vicodin 5mg. Pain constant and worse with getting out of chair   Had chest pain all day Monday  And went to er and left without being  Seen. Because  Because of wait. See ekg  Abnormal  Rate 160.  Patients must be seen every 3 months for their presription medication review and refill required by [place] law.  Patient has been advised on the potential for addiction  and dependence to their prescribed scheduled medication.  RUTH was obtained today and reviewed. This was scanned into the patient's chart.    he has a history of   Patient Active Problem List   Diagnosis   • Anxiety   • Arthritis   • CAD (coronary artery disease)   • HLD (hyperlipidemia)   • Benign essential hypertension   • DDD (degenerative disc disease), lumbosacral   • Stroke   • Screening for prostate cancer   • Hypertension   • Gastroesophageal reflux disease   • Hyperlipidemia   .  Since the last visit, he has overall felt well.  he has been compliant with   Current Outpatient Prescriptions:   •  amLODIPine (NORVASC) 5 MG tablet, Take 1 tablet by mouth Daily., Disp: 90 tablet, Rfl: 1  •  aspirin 81 MG tablet, Take 81 mg by mouth daily., Disp: , Rfl:   •  clopidogrel (PLAVIX) 75 MG tablet, Take 1 tablet by mouth Daily., Disp: 90 tablet, Rfl: 1  •  Hydrocodone-Acetaminophen (VICODIN) 5-300 MG per tablet, Take 1 tablet by mouth 2 (Two) Times a Day., Disp: 60 tablet, Rfl: 0  •  nitroglycerin (NITROSTAT) 0.4 MG SL tablet, Place 1 tablet under the tongue Every 5 (Five) Minutes As Needed for chest pain. Take no more than 3 doses in 15  "minutes., Disp: 20 tablet, Rfl: 1  •  omeprazole (PriLOSEC) 20 MG capsule, Take 1 capsule by mouth Daily., Disp: 90 capsule, Rfl: 1  •  ramipril (ALTACE) 5 MG capsule, Take 1 capsule by mouth Daily., Disp: 90 capsule, Rfl: 1  •  rosuvastatin (CRESTOR) 10 MG tablet, Take 1 tablet by mouth Daily., Disp: 90 tablet, Rfl: 0.  he denies medication side effects.    All of the chronic condition(s) listed above are stable w/o issues.    /78  Pulse 80  Temp 97.4 °F (36.3 °C) (Oral)   Resp 16  Ht 68\" (172.7 cm)  Wt 186 lb (84.4 kg)  SpO2 98%  BMI 28.28 kg/m2      The following portions of the patient's history were reviewed and updated as appropriate: allergies, current medications, past family history, past medical history, past social history, past surgical history and problem list.    Review of Systems   Constitutional: Negative for activity change, appetite change and unexpected weight change.   Eyes: Negative for visual disturbance.   Respiratory: Negative for chest tightness and shortness of breath.    Cardiovascular: Negative for chest pain and palpitations.   Skin: Negative for color change.   Neurological: Negative for syncope and speech difficulty.   Psychiatric/Behavioral: Negative for confusion and decreased concentration.       Objective   Physical Exam   Constitutional: He is oriented to person, place, and time. He appears well-developed and well-nourished.   HENT:   Head: Normocephalic.   Right Ear: External ear normal.   Left Ear: External ear normal.   Nose: Nose normal.   Mouth/Throat: Oropharynx is clear and moist.   Eyes: Conjunctivae and EOM are normal. Pupils are equal, round, and reactive to light.   Neck: Normal range of motion. Neck supple. No thyromegaly present.   Cardiovascular:   No murmur heard.  Tachycardiac  No chest pain but went to er and left   Again rate 180    Rate between 160-200   Pulmonary/Chest: Effort normal and breath sounds normal. No respiratory distress. He has no " wheezes. He has no rales.   Abdominal: Soft. Bowel sounds are normal.   Musculoskeletal:   Arthritis and ddd l/s spine   Arthritis hands  dupentyres contracture  Right hand.   Neurological: He is alert and oriented to person, place, and time.   Skin: Skin is warm and dry.   Psychiatric: He has a normal mood and affect. His behavior is normal. Judgment and thought content normal.   Nursing note and vitals reviewed.      Assessment/Plan   Madhu was seen today for heartburn, hyperlipidemia, hypertension and coronary artery disease.    Diagnoses and all orders for this visit:    Chest pain with high risk for cardiac etiology  Comments:  tachycardia  Orders:  -     ECG 12 Lead    Benign essential hypertension  -     amLODIPine (NORVASC) 5 MG tablet; Take 1 tablet by mouth Daily.    Coronary artery disease due to lipid rich plaque  -     clopidogrel (PLAVIX) 75 MG tablet; Take 1 tablet by mouth Daily.  -     ramipril (ALTACE) 5 MG capsule; Take 1 capsule by mouth Daily.    Gastroesophageal reflux disease, esophagitis presence not specified  -     omeprazole (priLOSEC) 20 MG capsule; Take 1 capsule by mouth Daily.    Mixed hyperlipidemia  -     rosuvastatin (CRESTOR) 10 MG tablet; Take 1 tablet by mouth Daily.    DDD (degenerative disc disease), lumbosacral  -     Hydrocodone-Acetaminophen (VICODIN) 5-300 MG per tablet; Take 1 tablet by mouth 2 (Two) Times a Day.

## 2017-04-08 ENCOUNTER — APPOINTMENT (OUTPATIENT)
Dept: CARDIOLOGY | Facility: HOSPITAL | Age: 72
End: 2017-04-08
Attending: INTERNAL MEDICINE

## 2017-04-08 LAB
ANION GAP SERPL CALCULATED.3IONS-SCNC: 13.7 MMOL/L
BH CV ECHO MEAS - ACS: 1.9 CM
BH CV ECHO MEAS - AO MAX PG (FULL): 7 MMHG
BH CV ECHO MEAS - AO MAX PG: 7.2 MMHG
BH CV ECHO MEAS - AO MEAN PG (FULL): 1 MMHG
BH CV ECHO MEAS - AO MEAN PG: 3 MMHG
BH CV ECHO MEAS - AO ROOT AREA (BSA CORRECTED): 1.8
BH CV ECHO MEAS - AO ROOT AREA: 10.2 CM^2
BH CV ECHO MEAS - AO ROOT DIAM: 3.6 CM
BH CV ECHO MEAS - AO V2 MAX: 134 CM/SEC
BH CV ECHO MEAS - AO V2 MEAN: 84.8 CM/SEC
BH CV ECHO MEAS - AO V2 VTI: 23.8 CM
BH CV ECHO MEAS - ASC AORTA: 3.4 CM
BH CV ECHO MEAS - AVA(I,A): 2.7 CM^2
BH CV ECHO MEAS - AVA(I,D): 2.7 CM^2
BH CV ECHO MEAS - AVA(V,A): 2.5 CM^2
BH CV ECHO MEAS - AVA(V,D): 2.5 CM^2
BH CV ECHO MEAS - BSA(HAYCOCK): 2 M^2
BH CV ECHO MEAS - BSA: 2 M^2
BH CV ECHO MEAS - BZI_BMI: 28.1 KILOGRAMS/M^2
BH CV ECHO MEAS - BZI_METRIC_HEIGHT: 172.7 CM
BH CV ECHO MEAS - BZI_METRIC_WEIGHT: 83.9 KG
BH CV ECHO MEAS - CONTRAST EF (2CH): 54.7 ML/M^2
BH CV ECHO MEAS - CONTRAST EF 4CH: 50 ML/M^2
BH CV ECHO MEAS - EDV(CUBED): 68.9 ML
BH CV ECHO MEAS - EDV(MOD-SP2): 86 ML
BH CV ECHO MEAS - EDV(MOD-SP4): 72 ML
BH CV ECHO MEAS - EDV(TEICH): 74.2 ML
BH CV ECHO MEAS - EF(CUBED): 64.6 %
BH CV ECHO MEAS - EF(MOD-SP2): 54.7 %
BH CV ECHO MEAS - EF(MOD-SP4): 50 %
BH CV ECHO MEAS - EF(TEICH): 56.6 %
BH CV ECHO MEAS - ESV(CUBED): 24.4 ML
BH CV ECHO MEAS - ESV(MOD-SP2): 39 ML
BH CV ECHO MEAS - ESV(MOD-SP4): 36 ML
BH CV ECHO MEAS - ESV(TEICH): 32.2 ML
BH CV ECHO MEAS - FS: 29.3 %
BH CV ECHO MEAS - IVS/LVPW: 1
BH CV ECHO MEAS - IVSD: 0.7 CM
BH CV ECHO MEAS - LAT PEAK E' VEL: 13 CM/SEC
BH CV ECHO MEAS - LV DIASTOLIC VOL/BSA (35-75): 36.4 ML/M^2
BH CV ECHO MEAS - LV MASS(C)D: 81.7 GRAMS
BH CV ECHO MEAS - LV MASS(C)DI: 41.3 GRAMS/M^2
BH CV ECHO MEAS - LV MAX PG: 4.5 MMHG
BH CV ECHO MEAS - LV MEAN PG: 2 MMHG
BH CV ECHO MEAS - LV SYSTOLIC VOL/BSA (12-30): 18.2 ML/M^2
BH CV ECHO MEAS - LV V1 MAX: 106 CM/SEC
BH CV ECHO MEAS - LV V1 MEAN: 69.1 CM/SEC
BH CV ECHO MEAS - LV V1 VTI: 20.8 CM
BH CV ECHO MEAS - LVIDD: 4.1 CM
BH CV ECHO MEAS - LVIDS: 2.9 CM
BH CV ECHO MEAS - LVLD AP2: 8.7 CM
BH CV ECHO MEAS - LVLD AP4: 8.2 CM
BH CV ECHO MEAS - LVLS AP2: 7.3 CM
BH CV ECHO MEAS - LVLS AP4: 7.4 CM
BH CV ECHO MEAS - LVOT AREA (M): 3.1 CM^2
BH CV ECHO MEAS - LVOT AREA: 3.1 CM^2
BH CV ECHO MEAS - LVOT DIAM: 2 CM
BH CV ECHO MEAS - LVPWD: 0.7 CM
BH CV ECHO MEAS - MED PEAK E' VEL: 10 CM/SEC
BH CV ECHO MEAS - MV DEC SLOPE: 950 CM/SEC^2
BH CV ECHO MEAS - MV DEC TIME: 0.12 SEC
BH CV ECHO MEAS - MV E MAX VEL: 115 CM/SEC
BH CV ECHO MEAS - MV MAX PG: 10 MMHG
BH CV ECHO MEAS - MV MEAN PG: 4 MMHG
BH CV ECHO MEAS - MV P1/2T MAX VEL: 159 CM/SEC
BH CV ECHO MEAS - MV P1/2T: 49 MSEC
BH CV ECHO MEAS - MV V2 MAX: 158 CM/SEC
BH CV ECHO MEAS - MV V2 MEAN: 83 CM/SEC
BH CV ECHO MEAS - MV V2 VTI: 22.7 CM
BH CV ECHO MEAS - MVA P1/2T LCG: 1.4 CM^2
BH CV ECHO MEAS - MVA(P1/2T): 4.5 CM^2
BH CV ECHO MEAS - MVA(VTI): 2.9 CM^2
BH CV ECHO MEAS - PA ACC TIME: 0.12 SEC
BH CV ECHO MEAS - PA MAX PG (FULL): 2.6 MMHG
BH CV ECHO MEAS - PA MAX PG: 4 MMHG
BH CV ECHO MEAS - PA PR(ACCEL): 23.7 MMHG
BH CV ECHO MEAS - PA V2 MAX: 100 CM/SEC
BH CV ECHO MEAS - PULM DIAS VEL: 66.5 CM/SEC
BH CV ECHO MEAS - PULM S/D: 0.58
BH CV ECHO MEAS - PULM SYS VEL: 38.8 CM/SEC
BH CV ECHO MEAS - PVA(V,A): 2.5 CM^2
BH CV ECHO MEAS - PVA(V,D): 2.5 CM^2
BH CV ECHO MEAS - QP/QS: 0.68
BH CV ECHO MEAS - RAP SYSTOLE: 3 MMHG
BH CV ECHO MEAS - RV MAX PG: 1.4 MMHG
BH CV ECHO MEAS - RV MEAN PG: 1 MMHG
BH CV ECHO MEAS - RV V1 MAX: 59.1 CM/SEC
BH CV ECHO MEAS - RV V1 MEAN: 35.1 CM/SEC
BH CV ECHO MEAS - RV V1 VTI: 10.7 CM
BH CV ECHO MEAS - RVOT AREA: 4.2 CM^2
BH CV ECHO MEAS - RVOT DIAM: 2.3 CM
BH CV ECHO MEAS - RVSP: 25.1 MMHG
BH CV ECHO MEAS - SI(AO): 122.5 ML/M^2
BH CV ECHO MEAS - SI(CUBED): 22.5 ML/M^2
BH CV ECHO MEAS - SI(LVOT): 33 ML/M^2
BH CV ECHO MEAS - SI(MOD-SP2): 23.8 ML/M^2
BH CV ECHO MEAS - SI(MOD-SP4): 18.2 ML/M^2
BH CV ECHO MEAS - SI(TEICH): 21.2 ML/M^2
BH CV ECHO MEAS - SV(AO): 242.3 ML
BH CV ECHO MEAS - SV(CUBED): 44.5 ML
BH CV ECHO MEAS - SV(LVOT): 65.3 ML
BH CV ECHO MEAS - SV(MOD-SP2): 47 ML
BH CV ECHO MEAS - SV(MOD-SP4): 36 ML
BH CV ECHO MEAS - SV(RVOT): 44.5 ML
BH CV ECHO MEAS - SV(TEICH): 42 ML
BH CV ECHO MEAS - TAPSE (>1.6): 2.1 CM2
BH CV ECHO MEAS - TR MAX VEL: 235 CM/SEC
BH CV XLRA - RV BASE: 4.1 CM
BH CV XLRA - RV LENGTH: 7.3 CM
BH CV XLRA - RV MID: 2.6 CM
BH CV XLRA - TDI S': 17 CM/SEC
BUN BLD-MCNC: 15 MG/DL (ref 8–23)
BUN/CREAT SERPL: 16.1 (ref 7–25)
CALCIUM SPEC-SCNC: 8.9 MG/DL (ref 8.6–10.5)
CHLORIDE SERPL-SCNC: 105 MMOL/L (ref 98–107)
CO2 SERPL-SCNC: 22.3 MMOL/L (ref 22–29)
CREAT BLD-MCNC: 0.93 MG/DL (ref 0.76–1.27)
DEPRECATED RDW RBC AUTO: 48 FL (ref 37–54)
E/E' RATIO: 10
ERYTHROCYTE [DISTWIDTH] IN BLOOD BY AUTOMATED COUNT: 13.6 % (ref 11.5–14.5)
GFR SERPL CREATININE-BSD FRML MDRD: 80 ML/MIN/1.73
GLUCOSE BLD-MCNC: 107 MG/DL (ref 65–99)
HCT VFR BLD AUTO: 49.1 % (ref 40.4–52.2)
HGB BLD-MCNC: 15.9 G/DL (ref 13.7–17.6)
LEFT ATRIUM VOLUME INDEX: 26 ML/M2
LEFT ATRIUM VOLUME: 50 CM3
MAGNESIUM SERPL-MCNC: 2.2 MG/DL (ref 1.6–2.4)
MCH RBC QN AUTO: 31.5 PG (ref 27–32.7)
MCHC RBC AUTO-ENTMCNC: 32.4 G/DL (ref 32.6–36.4)
MCV RBC AUTO: 97.4 FL (ref 79.8–96.2)
PLATELET # BLD AUTO: 421 10*3/MM3 (ref 140–500)
PMV BLD AUTO: 10.6 FL (ref 6–12)
POTASSIUM BLD-SCNC: 4 MMOL/L (ref 3.5–5.2)
RBC # BLD AUTO: 5.04 10*6/MM3 (ref 4.6–6)
SODIUM BLD-SCNC: 141 MMOL/L (ref 136–145)
T4 FREE SERPL-MCNC: 1.42 NG/DL (ref 0.93–1.7)
TROPONIN T SERPL-MCNC: <0.01 NG/ML (ref 0–0.03)
TSH SERPL DL<=0.05 MIU/L-ACNC: 5.1 MIU/ML (ref 0.27–4.2)
WBC NRBC COR # BLD: 6.56 10*3/MM3 (ref 4.5–10.7)

## 2017-04-08 PROCEDURE — 25010000002 AMIODARONE IN DEXTROSE 5% 360 MG/200ML SOLUTION: Performed by: INTERNAL MEDICINE

## 2017-04-08 PROCEDURE — 84443 ASSAY THYROID STIM HORMONE: CPT | Performed by: INTERNAL MEDICINE

## 2017-04-08 PROCEDURE — 25010000002 ENOXAPARIN PER 10 MG: Performed by: INTERNAL MEDICINE

## 2017-04-08 PROCEDURE — 93306 TTE W/DOPPLER COMPLETE: CPT | Performed by: INTERNAL MEDICINE

## 2017-04-08 PROCEDURE — 93005 ELECTROCARDIOGRAM TRACING: CPT | Performed by: INTERNAL MEDICINE

## 2017-04-08 PROCEDURE — 93306 TTE W/DOPPLER COMPLETE: CPT

## 2017-04-08 PROCEDURE — 84484 ASSAY OF TROPONIN QUANT: CPT | Performed by: INTERNAL MEDICINE

## 2017-04-08 PROCEDURE — 93010 ELECTROCARDIOGRAM REPORT: CPT | Performed by: INTERNAL MEDICINE

## 2017-04-08 PROCEDURE — 25010000002 AMIODARONE IN DEXTROSE 5% 150 MG/100ML SOLUTION: Performed by: INTERNAL MEDICINE

## 2017-04-08 PROCEDURE — 83735 ASSAY OF MAGNESIUM: CPT | Performed by: INTERNAL MEDICINE

## 2017-04-08 PROCEDURE — 84439 ASSAY OF FREE THYROXINE: CPT | Performed by: INTERNAL MEDICINE

## 2017-04-08 PROCEDURE — 99232 SBSQ HOSP IP/OBS MODERATE 35: CPT | Performed by: INTERNAL MEDICINE

## 2017-04-08 PROCEDURE — 85027 COMPLETE CBC AUTOMATED: CPT | Performed by: INTERNAL MEDICINE

## 2017-04-08 PROCEDURE — 80048 BASIC METABOLIC PNL TOTAL CA: CPT | Performed by: INTERNAL MEDICINE

## 2017-04-08 RX ORDER — HYDROCODONE BITARTRATE AND ACETAMINOPHEN 10; 325 MG/1; MG/1
0.5 TABLET ORAL EVERY 12 HOURS SCHEDULED
Status: DISCONTINUED | OUTPATIENT
Start: 2017-04-09 | End: 2017-04-11

## 2017-04-08 RX ADMIN — DILTIAZEM HYDROCHLORIDE 5 MG/HR: 100 INJECTION, POWDER, LYOPHILIZED, FOR SOLUTION INTRAVENOUS at 06:51

## 2017-04-08 RX ADMIN — ENOXAPARIN SODIUM 80 MG: 80 INJECTION SUBCUTANEOUS at 12:30

## 2017-04-08 RX ADMIN — AMIODARONE HYDROCHLORIDE 150 MG: 1.5 INJECTION, SOLUTION INTRAVENOUS at 08:46

## 2017-04-08 RX ADMIN — AMIODARONE HYDROCHLORIDE 0.5 MG/MIN: 1.8 INJECTION, SOLUTION INTRAVENOUS at 14:46

## 2017-04-08 RX ADMIN — RAMIPRIL 5 MG: 5 CAPSULE ORAL at 08:45

## 2017-04-08 RX ADMIN — ATORVASTATIN CALCIUM 40 MG: 40 TABLET, FILM COATED ORAL at 08:45

## 2017-04-08 RX ADMIN — AMLODIPINE BESYLATE 5 MG: 5 TABLET ORAL at 08:45

## 2017-04-08 RX ADMIN — ENOXAPARIN SODIUM 80 MG: 80 INJECTION SUBCUTANEOUS at 01:33

## 2017-04-08 RX ADMIN — AMIODARONE HYDROCHLORIDE 1 MG/MIN: 1.8 INJECTION, SOLUTION INTRAVENOUS at 08:57

## 2017-04-08 RX ADMIN — ENOXAPARIN SODIUM 80 MG: 80 INJECTION SUBCUTANEOUS at 22:43

## 2017-04-08 RX ADMIN — PANTOPRAZOLE SODIUM 40 MG: 40 TABLET, DELAYED RELEASE ORAL at 08:50

## 2017-04-08 NOTE — PLAN OF CARE
Problem: Patient Care Overview (Adult)  Goal: Plan of Care Review  Outcome: Ongoing (interventions implemented as appropriate)    04/08/17 7926   Coping/Psychosocial Response Interventions   Plan Of Care Reviewed With patient   Patient Care Overview   Progress no change   Outcome Evaluation   Outcome Summary/Follow up Plan Pt remains in Atrial fibrillation/Atrial flutter, with rate in the 70s-80s. Cardizem gtt discontinued, Amiodarone bolus and gtt initiated. Pt with no c/o's of pain or SOA. Plan is for cardiac catheterization on Monday. Dr. Wright also discussed the possibility of an ablation, plan is for Dr. Gustafson to see on Monday. Will continue to monitor.        Goal: Adult Individualization and Mutuality  Outcome: Ongoing (interventions implemented as appropriate)  Goal: Discharge Needs Assessment  Outcome: Ongoing (interventions implemented as appropriate)    Problem: Arrhythmia/Dysrhythmia (Symptomatic) (Adult)  Goal: Signs and Symptoms of Listed Potential Problems Will be Absent or Manageable (Arrhythmia/Dysrhythmia)  Outcome: Ongoing (interventions implemented as appropriate)

## 2017-04-08 NOTE — NURSING NOTE
Approximately 0640 this am, pt converted to SR with HR 80's then went back to aflutter, then SR, and has been intermittently between SR and aflutter. CLAUDIO Feliciano with [facility] informed. Cardizem gtt restarted at 5.

## 2017-04-08 NOTE — PROGRESS NOTES
LOS: 1 day   Patient Care Team:  Damian Chen MD as PCP - General (Family Medicine)  Damian Chen MD as PCP - Claims Attributed - PLEASE DO NOT REMOVE  Damian Chen MD as MSSP ACO Attributed - PLEASE DO NOT REMOVE    Chief Complaint: Follow-up for new typical atrial flutter, CP, CAD    Interval History: Intermittently in sinus rhythm alternating with 2:1 atrial flutter.  No CP or SOA.      Vital Signs:  Temp:  [97.4 °F (36.3 °C)-97.6 °F (36.4 °C)] 97.6 °F (36.4 °C)  Heart Rate:  [] 84  Resp:  [16-20] 20  BP: ()/() 104/67  No intake or output data in the 24 hours ending 04/08/17 0823    Physical Exam:   General Appearance:    No acute distress, alert and oriented x4   Lungs:     Clear to auscultation bilaterally     Heart:    Regular rhythm and normal rate.  No murmurs, gallops, or       rubs.   Abdomen:     Soft, non-tender, non-distended.    Extremities:   Moves all extremities well.  No clubbing, cyanosis, or edema.     Results Review:      Results from last 7 days  Lab Units 04/08/17  0500   SODIUM mmol/L 141   POTASSIUM mmol/L 4.0   CHLORIDE mmol/L 105   TOTAL CO2 mmol/L 22.3   BUN mg/dL 15   CREATININE mg/dL 0.93   GLUCOSE mg/dL 107*   CALCIUM mg/dL 8.9       Results from last 7 days  Lab Units 04/08/17  0500 04/07/17  1820 04/07/17  1012   TROPONIN T ng/mL <0.010 <0.010 <0.010       Results from last 7 days  Lab Units 04/08/17  0500   WBC 10*3/mm3 6.56   HEMOGLOBIN g/dL 15.9   HEMATOCRIT % 49.1   PLATELETS 10*3/mm3 421               Results from last 7 days  Lab Units 04/08/17  0500   MAGNESIUM mg/dL 2.2           I reviewed the patient's new clinical results.        Assessment:  1. Typical atrial flutter (new)  2. Chest pain  3. Coronary artery disease (60% mid RCA and 50% LAD by cath in 2011).  4. Hypertension  5. Hyperlipidemia  6. PVD, s/p CEA     Plan:  -Intermittently with atrial flutter and sinus rhythm at this point.  Currently NSR.  Will discontinue Cardizem gtt and start  Amiodarone loading drip.  He may ultimately need an ablation.  I will have Dr. Gustafson see him Monday.  -Continue therapeutic Lovenox for now.  -Echo today.  -Plan for left heart cath on Monday.  On ASA.  Off Plavix for now while on Lovenox.     Eric Wright MD  04/08/17  8:23 AM

## 2017-04-09 LAB
ANION GAP SERPL CALCULATED.3IONS-SCNC: 13.3 MMOL/L
BASOPHILS # BLD AUTO: 0.02 10*3/MM3 (ref 0–0.2)
BASOPHILS NFR BLD AUTO: 0.3 % (ref 0–1.5)
BUN BLD-MCNC: 15 MG/DL (ref 8–23)
BUN/CREAT SERPL: 16.5 (ref 7–25)
CALCIUM SPEC-SCNC: 8.7 MG/DL (ref 8.6–10.5)
CHLORIDE SERPL-SCNC: 105 MMOL/L (ref 98–107)
CO2 SERPL-SCNC: 21.7 MMOL/L (ref 22–29)
CREAT BLD-MCNC: 0.91 MG/DL (ref 0.76–1.27)
DEPRECATED RDW RBC AUTO: 47.4 FL (ref 37–54)
EOSINOPHIL # BLD AUTO: 0.11 10*3/MM3 (ref 0–0.7)
EOSINOPHIL NFR BLD AUTO: 1.7 % (ref 0.3–6.2)
ERYTHROCYTE [DISTWIDTH] IN BLOOD BY AUTOMATED COUNT: 13.4 % (ref 11.5–14.5)
GFR SERPL CREATININE-BSD FRML MDRD: 82 ML/MIN/1.73
GLUCOSE BLD-MCNC: 106 MG/DL (ref 65–99)
HCT VFR BLD AUTO: 45.8 % (ref 40.4–52.2)
HGB BLD-MCNC: 14.9 G/DL (ref 13.7–17.6)
IMM GRANULOCYTES # BLD: 0.03 10*3/MM3 (ref 0–0.03)
IMM GRANULOCYTES NFR BLD: 0.5 % (ref 0–0.5)
LYMPHOCYTES # BLD AUTO: 1.51 10*3/MM3 (ref 0.9–4.8)
LYMPHOCYTES NFR BLD AUTO: 22.8 % (ref 19.6–45.3)
MCH RBC QN AUTO: 31.5 PG (ref 27–32.7)
MCHC RBC AUTO-ENTMCNC: 32.5 G/DL (ref 32.6–36.4)
MCV RBC AUTO: 96.8 FL (ref 79.8–96.2)
MONOCYTES # BLD AUTO: 0.83 10*3/MM3 (ref 0.2–1.2)
MONOCYTES NFR BLD AUTO: 12.5 % (ref 5–12)
NEUTROPHILS # BLD AUTO: 4.13 10*3/MM3 (ref 1.9–8.1)
NEUTROPHILS NFR BLD AUTO: 62.2 % (ref 42.7–76)
PLATELET # BLD AUTO: 380 10*3/MM3 (ref 140–500)
PMV BLD AUTO: 10.7 FL (ref 6–12)
POTASSIUM BLD-SCNC: 3.8 MMOL/L (ref 3.5–5.2)
RBC # BLD AUTO: 4.73 10*6/MM3 (ref 4.6–6)
SODIUM BLD-SCNC: 140 MMOL/L (ref 136–145)
WBC NRBC COR # BLD: 6.63 10*3/MM3 (ref 4.5–10.7)

## 2017-04-09 PROCEDURE — 93005 ELECTROCARDIOGRAM TRACING: CPT | Performed by: INTERNAL MEDICINE

## 2017-04-09 PROCEDURE — 99232 SBSQ HOSP IP/OBS MODERATE 35: CPT | Performed by: INTERNAL MEDICINE

## 2017-04-09 PROCEDURE — 25010000002 ENOXAPARIN PER 10 MG: Performed by: INTERNAL MEDICINE

## 2017-04-09 PROCEDURE — 93010 ELECTROCARDIOGRAM REPORT: CPT | Performed by: INTERNAL MEDICINE

## 2017-04-09 PROCEDURE — 85025 COMPLETE CBC W/AUTO DIFF WBC: CPT | Performed by: INTERNAL MEDICINE

## 2017-04-09 PROCEDURE — 80048 BASIC METABOLIC PNL TOTAL CA: CPT | Performed by: INTERNAL MEDICINE

## 2017-04-09 PROCEDURE — 25010000002 AMIODARONE IN DEXTROSE 5% 360 MG/200ML SOLUTION: Performed by: INTERNAL MEDICINE

## 2017-04-09 RX ADMIN — RAMIPRIL 5 MG: 5 CAPSULE ORAL at 08:20

## 2017-04-09 RX ADMIN — AMLODIPINE BESYLATE 5 MG: 5 TABLET ORAL at 08:20

## 2017-04-09 RX ADMIN — ATORVASTATIN CALCIUM 40 MG: 40 TABLET, FILM COATED ORAL at 08:20

## 2017-04-09 RX ADMIN — AMIODARONE HYDROCHLORIDE 0.5 MG/MIN: 1.8 INJECTION, SOLUTION INTRAVENOUS at 17:47

## 2017-04-09 RX ADMIN — PANTOPRAZOLE SODIUM 40 MG: 40 TABLET, DELAYED RELEASE ORAL at 06:31

## 2017-04-09 RX ADMIN — AMIODARONE HYDROCHLORIDE 0.5 MG/MIN: 1.8 INJECTION, SOLUTION INTRAVENOUS at 06:30

## 2017-04-09 RX ADMIN — ENOXAPARIN SODIUM 80 MG: 80 INJECTION SUBCUTANEOUS at 10:41

## 2017-04-09 RX ADMIN — ENOXAPARIN SODIUM 80 MG: 80 INJECTION SUBCUTANEOUS at 22:00

## 2017-04-09 RX ADMIN — ENOXAPARIN SODIUM 80 MG: 80 INJECTION SUBCUTANEOUS at 20:51

## 2017-04-09 NOTE — PROGRESS NOTES
Hospital Follow Up        Chief Complaint: Follow-up for new typical atrial flutter, CP, CAD    Interval History: No chest pain since last Tuesday.  Converted back to sinus rhythm. No dyspnea.     Objective:     Objective:  Temp:  [97.4 °F (36.3 °C)-97.9 °F (36.6 °C)] 97.9 °F (36.6 °C)  Heart Rate:  [55-79] 57  Resp:  [16-18] 16  BP: (112-129)/(66-88) 129/88     Intake/Output Summary (Last 24 hours) at 04/09/17 1030  Last data filed at 04/08/17 1528   Gross per 24 hour   Intake              480 ml   Output                0 ml   Net              480 ml     Body mass index is 28.13 kg/(m^2).  Last 3 weights    04/07/17  0935 04/07/17  1518   Weight: 185 lb (83.9 kg) 185 lb (83.9 kg)     Weight change:       Physical Exam:   General : Alert, cooperative, in no acute distress.  Neuro: alert,cooperative and oriented  Lungs: CTAB. Normal respiratory effort and rate.  CV:: Regular rate and rhythm, normal S1 and S2, no murmurs, gallops or rubs.  ABD: Soft, nontender, non-distended. positive bowel sounds  Extr: No edema or cyanosis, moves all extremities    Lab Review:     Results from last 7 days  Lab Units 04/09/17  0422 04/08/17  0500 04/07/17  1012   SODIUM mmol/L 140 141 141   POTASSIUM mmol/L 3.8 4.0 4.5   CHLORIDE mmol/L 105 105 101   TOTAL CO2 mmol/L 21.7* 22.3 25.6   BUN mg/dL 15 15 16   CREATININE mg/dL 0.91 0.93 0.99   GLUCOSE mg/dL 106* 107* 109*   CALCIUM mg/dL 8.7 8.9 10.0   AST (SGOT) U/L  --   --  28   ALT (SGPT) U/L  --   --  26       Results from last 7 days  Lab Units 04/08/17  0500 04/07/17  1820 04/07/17  1012   TROPONIN T ng/mL <0.010 <0.010 <0.010       Results from last 7 days  Lab Units 04/09/17  0422 04/08/17  0500   WBC 10*3/mm3 6.63 6.56   HEMOGLOBIN g/dL 14.9 15.9   HEMATOCRIT % 45.8 49.1   PLATELETS 10*3/mm3 380 421           Results from last 7 days  Lab Units 04/08/17  0500 04/07/17  1012   MAGNESIUM mg/dL 2.2 2.1               Results from last 7 days  Lab Units 04/08/17  0500  04/07/17  1012   TSH mIU/mL 5.100* 3.800     I reviewed the patient's new clinical results.  I personally viewed and interpreted the patient's EKG  Current Medications:   Scheduled Meds:  amLODIPine 5 mg Oral Daily   aspirin 81 mg Oral Once   atorvastatin 40 mg Oral Daily   digoxin 250 mcg Intravenous Once   diltiaZEM 10 mg Intravenous Once   enoxaparin 80 mg Subcutaneous Q12H   HYDROcodone-acetaminophen 0.5 tablet Oral Q12H   pantoprazole 40 mg Oral Q AM   ramipril 5 mg Oral Daily     Continuous Infusions:  amiodarone 0.5 mg/min Last Rate: 0.5 mg/min (04/09/17 0630)       Allergies:  Allergies   Allergen Reactions   • Penicillins        Assessment/Plan:     1. Typical atrial flutter: New diagnosis.  Back in sinus rhythm on amiodarone gtt.   2. Chest pain: Concerning for unstable angina although no pain since admission. On aspirin and statin.  3. Coronary artery disease (60% mid RCA and 50% LAD by cath in 2011).  4. Hypertension  5. Hyperlipidemia  6. PVD, s/p CEA     -  Continue amiodarone gtt for now.   -  Plan for cath in am per Dr. Wright.  Will hold lovenox prior to cath        Marjorie Healy MD  04/09/17  10:30 AM

## 2017-04-09 NOTE — PLAN OF CARE
Problem: Patient Care Overview (Adult)  Goal: Plan of Care Review  Outcome: Ongoing (interventions implemented as appropriate)    04/09/17 0249   Coping/Psychosocial Response Interventions   Plan Of Care Reviewed With patient   Patient Care Overview   Progress improving   Outcome Evaluation   Outcome Summary/Follow up Plan Pt converted to SB at the beginning of the shift. HR remains 50s. Amio gtt continues. No c/o noted. Will continue to monitor.       Goal: Adult Individualization and Mutuality  Outcome: Ongoing (interventions implemented as appropriate)  Goal: Discharge Needs Assessment  Outcome: Ongoing (interventions implemented as appropriate)    04/07/17 1708 04/07/17 1712   Living Environment   Transportation Available --  family or friend will provide   Self-Care   Equipment Currently Used at Home none --          Problem: Arrhythmia/Dysrhythmia (Symptomatic) (Adult)  Goal: Signs and Symptoms of Listed Potential Problems Will be Absent or Manageable (Arrhythmia/Dysrhythmia)  Outcome: Ongoing (interventions implemented as appropriate)    04/09/17 0249   Arrhythmia/Dysrhythmia (Symptomatic)   Problems Assessed (Arrhythmia/Dysrhythmia) all   Problems Present (Arrhythmia/Dysrhythmia) electrophysiological conduction defect

## 2017-04-10 ENCOUNTER — APPOINTMENT (OUTPATIENT)
Dept: CARDIOLOGY | Facility: HOSPITAL | Age: 72
End: 2017-04-10
Attending: THORACIC SURGERY (CARDIOTHORACIC VASCULAR SURGERY)

## 2017-04-10 ENCOUNTER — ANESTHESIA EVENT (OUTPATIENT)
Dept: PERIOP | Facility: HOSPITAL | Age: 72
End: 2017-04-10

## 2017-04-10 ENCOUNTER — APPOINTMENT (OUTPATIENT)
Dept: RESPIRATORY THERAPY | Facility: HOSPITAL | Age: 72
End: 2017-04-10
Attending: THORACIC SURGERY (CARDIOTHORACIC VASCULAR SURGERY)

## 2017-04-10 ENCOUNTER — APPOINTMENT (OUTPATIENT)
Dept: GENERAL RADIOLOGY | Facility: HOSPITAL | Age: 72
End: 2017-04-10
Attending: THORACIC SURGERY (CARDIOTHORACIC VASCULAR SURGERY)

## 2017-04-10 LAB
ABO GROUP BLD: NORMAL
ALBUMIN SERPL-MCNC: 3.3 G/DL (ref 3.5–5.2)
ALBUMIN/GLOB SERPL: 0.9 G/DL
ALP SERPL-CCNC: 103 U/L (ref 39–117)
ALT SERPL W P-5'-P-CCNC: 31 U/L (ref 1–41)
ANION GAP SERPL CALCULATED.3IONS-SCNC: 13.1 MMOL/L
ARTERIAL PATENCY WRIST A: POSITIVE
AST SERPL-CCNC: 38 U/L (ref 1–40)
ATMOSPHERIC PRESS: 756.6 MMHG
BASE EXCESS BLDA CALC-SCNC: -0.6 MMOL/L (ref 0–2)
BASOPHILS # BLD AUTO: 0.01 10*3/MM3 (ref 0–0.2)
BASOPHILS NFR BLD AUTO: 0.1 % (ref 0–1.5)
BDY SITE: ABNORMAL
BILIRUB SERPL-MCNC: 0.4 MG/DL (ref 0.1–1.2)
BILIRUB UR QL STRIP: NEGATIVE
BLD GP AB SCN SERPL QL: NEGATIVE
BUN BLD-MCNC: 12 MG/DL (ref 8–23)
BUN/CREAT SERPL: 13.5 (ref 7–25)
CALCIUM SPEC-SCNC: 9.2 MG/DL (ref 8.6–10.5)
CHLORIDE SERPL-SCNC: 104 MMOL/L (ref 98–107)
CHOLEST SERPL-MCNC: 138 MG/DL (ref 0–200)
CLARITY UR: CLEAR
CLOSE TME COLL+ADP + EPINEP PNL BLD: 43 %
CO2 SERPL-SCNC: 23.9 MMOL/L (ref 22–29)
COLOR UR: YELLOW
CREAT BLD-MCNC: 0.89 MG/DL (ref 0.76–1.27)
DEPRECATED RDW RBC AUTO: 46.1 FL (ref 37–54)
EOSINOPHIL # BLD AUTO: 0.05 10*3/MM3 (ref 0–0.7)
EOSINOPHIL NFR BLD AUTO: 0.7 % (ref 0.3–6.2)
ERYTHROCYTE [DISTWIDTH] IN BLOOD BY AUTOMATED COUNT: 13.3 % (ref 11.5–14.5)
GFR SERPL CREATININE-BSD FRML MDRD: 84 ML/MIN/1.73
GLOBULIN UR ELPH-MCNC: 3.8 GM/DL
GLUCOSE BLD-MCNC: 82 MG/DL (ref 65–99)
GLUCOSE UR STRIP-MCNC: NEGATIVE MG/DL
HBA1C MFR BLD: 5.45 % (ref 4.8–5.6)
HCO3 BLDA-SCNC: 24.2 MMOL/L (ref 22–28)
HCT VFR BLD AUTO: 45.8 % (ref 40.4–52.2)
HDLC SERPL-MCNC: 43 MG/DL (ref 40–60)
HGB BLD-MCNC: 15.1 G/DL (ref 13.7–17.6)
HGB UR QL STRIP.AUTO: NEGATIVE
IMM GRANULOCYTES # BLD: 0.02 10*3/MM3 (ref 0–0.03)
IMM GRANULOCYTES NFR BLD: 0.3 % (ref 0–0.5)
KETONES UR QL STRIP: NEGATIVE
LDLC SERPL CALC-MCNC: 81 MG/DL (ref 0–100)
LDLC/HDLC SERPL: 1.89 {RATIO}
LEUKOCYTE ESTERASE UR QL STRIP.AUTO: NEGATIVE
LYMPHOCYTES # BLD AUTO: 1.72 10*3/MM3 (ref 0.9–4.8)
LYMPHOCYTES NFR BLD AUTO: 23.1 % (ref 19.6–45.3)
MAGNESIUM SERPL-MCNC: 2.2 MG/DL (ref 1.6–2.4)
MCH RBC QN AUTO: 31.7 PG (ref 27–32.7)
MCHC RBC AUTO-ENTMCNC: 33 G/DL (ref 32.6–36.4)
MCV RBC AUTO: 96 FL (ref 79.8–96.2)
MODALITY: ABNORMAL
MONOCYTES # BLD AUTO: 0.68 10*3/MM3 (ref 0.2–1.2)
MONOCYTES NFR BLD AUTO: 9.1 % (ref 5–12)
NEUTROPHILS # BLD AUTO: 4.97 10*3/MM3 (ref 1.9–8.1)
NEUTROPHILS NFR BLD AUTO: 66.7 % (ref 42.7–76)
NITRITE UR QL STRIP: NEGATIVE
PCO2 BLDA: 39.3 MM HG (ref 35–45)
PH BLDA: 7.4 PH UNITS (ref 7.35–7.45)
PH UR STRIP.AUTO: 6.5 [PH] (ref 5–8)
PLATELET # BLD AUTO: 382 10*3/MM3 (ref 140–500)
PMV BLD AUTO: 10.7 FL (ref 6–12)
PO2 BLDA: 67.6 MM HG (ref 80–100)
POTASSIUM BLD-SCNC: 4.2 MMOL/L (ref 3.5–5.2)
PROT SERPL-MCNC: 7.1 G/DL (ref 6–8.5)
PROT UR QL STRIP: NEGATIVE
RBC # BLD AUTO: 4.77 10*6/MM3 (ref 4.6–6)
RH BLD: NEGATIVE
SAO2 % BLDCOA: 93.2 % (ref 92–99)
SET MECH RESP RATE: 18
SODIUM BLD-SCNC: 141 MMOL/L (ref 136–145)
SP GR UR STRIP: >=1.03 (ref 1–1.03)
TRIGL SERPL-MCNC: 68 MG/DL (ref 0–150)
TSH SERPL DL<=0.05 MIU/L-ACNC: 5.54 MIU/ML (ref 0.27–4.2)
UROBILINOGEN UR QL STRIP: NORMAL
VLDLC SERPL-MCNC: 13.6 MG/DL (ref 5–40)
WBC NRBC COR # BLD: 7.45 10*3/MM3 (ref 4.5–10.7)

## 2017-04-10 PROCEDURE — 25010000002 AMIODARONE IN DEXTROSE 5% 360 MG/200ML SOLUTION: Performed by: INTERNAL MEDICINE

## 2017-04-10 PROCEDURE — 0 IOPAMIDOL PER 1 ML: Performed by: INTERNAL MEDICINE

## 2017-04-10 PROCEDURE — 94799 UNLISTED PULMONARY SVC/PX: CPT

## 2017-04-10 PROCEDURE — 80061 LIPID PANEL: CPT | Performed by: THORACIC SURGERY (CARDIOTHORACIC VASCULAR SURGERY)

## 2017-04-10 PROCEDURE — 84443 ASSAY THYROID STIM HORMONE: CPT | Performed by: THORACIC SURGERY (CARDIOTHORACIC VASCULAR SURGERY)

## 2017-04-10 PROCEDURE — 99152 MOD SED SAME PHYS/QHP 5/>YRS: CPT | Performed by: INTERNAL MEDICINE

## 2017-04-10 PROCEDURE — 99232 SBSQ HOSP IP/OBS MODERATE 35: CPT | Performed by: INTERNAL MEDICINE

## 2017-04-10 PROCEDURE — 71020 HC CHEST PA AND LATERAL: CPT

## 2017-04-10 PROCEDURE — 85576 BLOOD PLATELET AGGREGATION: CPT | Performed by: THORACIC SURGERY (CARDIOTHORACIC VASCULAR SURGERY)

## 2017-04-10 PROCEDURE — 5A1221Z PERFORMANCE OF CARDIAC OUTPUT, CONTINUOUS: ICD-10-PCS | Performed by: INTERNAL MEDICINE

## 2017-04-10 PROCEDURE — 80053 COMPREHEN METABOLIC PANEL: CPT | Performed by: THORACIC SURGERY (CARDIOTHORACIC VASCULAR SURGERY)

## 2017-04-10 PROCEDURE — B2111ZZ FLUOROSCOPY OF MULTIPLE CORONARY ARTERIES USING LOW OSMOLAR CONTRAST: ICD-10-PCS | Performed by: INTERNAL MEDICINE

## 2017-04-10 PROCEDURE — 93010 ELECTROCARDIOGRAM REPORT: CPT | Performed by: INTERNAL MEDICINE

## 2017-04-10 PROCEDURE — 36600 WITHDRAWAL OF ARTERIAL BLOOD: CPT

## 2017-04-10 PROCEDURE — 83036 HEMOGLOBIN GLYCOSYLATED A1C: CPT | Performed by: THORACIC SURGERY (CARDIOTHORACIC VASCULAR SURGERY)

## 2017-04-10 PROCEDURE — C1769 GUIDE WIRE: HCPCS | Performed by: INTERNAL MEDICINE

## 2017-04-10 PROCEDURE — 93458 L HRT ARTERY/VENTRICLE ANGIO: CPT | Performed by: INTERNAL MEDICINE

## 2017-04-10 PROCEDURE — 93005 ELECTROCARDIOGRAM TRACING: CPT | Performed by: INTERNAL MEDICINE

## 2017-04-10 PROCEDURE — 93880 EXTRACRANIAL BILAT STUDY: CPT

## 2017-04-10 PROCEDURE — 86923 COMPATIBILITY TEST ELECTRIC: CPT

## 2017-04-10 PROCEDURE — 25010000002 FENTANYL CITRATE (PF) 100 MCG/2ML SOLUTION: Performed by: INTERNAL MEDICINE

## 2017-04-10 PROCEDURE — 99222 1ST HOSP IP/OBS MODERATE 55: CPT | Performed by: NURSE PRACTITIONER

## 2017-04-10 PROCEDURE — 86850 RBC ANTIBODY SCREEN: CPT | Performed by: THORACIC SURGERY (CARDIOTHORACIC VASCULAR SURGERY)

## 2017-04-10 PROCEDURE — 93005 ELECTROCARDIOGRAM TRACING: CPT | Performed by: THORACIC SURGERY (CARDIOTHORACIC VASCULAR SURGERY)

## 2017-04-10 PROCEDURE — 99153 MOD SED SAME PHYS/QHP EA: CPT | Performed by: INTERNAL MEDICINE

## 2017-04-10 PROCEDURE — 86900 BLOOD TYPING SEROLOGIC ABO: CPT

## 2017-04-10 PROCEDURE — B2151ZZ FLUOROSCOPY OF LEFT HEART USING LOW OSMOLAR CONTRAST: ICD-10-PCS | Performed by: INTERNAL MEDICINE

## 2017-04-10 PROCEDURE — 94010 BREATHING CAPACITY TEST: CPT

## 2017-04-10 PROCEDURE — 81003 URINALYSIS AUTO W/O SCOPE: CPT | Performed by: THORACIC SURGERY (CARDIOTHORACIC VASCULAR SURGERY)

## 2017-04-10 PROCEDURE — 85025 COMPLETE CBC W/AUTO DIFF WBC: CPT | Performed by: THORACIC SURGERY (CARDIOTHORACIC VASCULAR SURGERY)

## 2017-04-10 PROCEDURE — 25010000002 HEPARIN (PORCINE) PER 1000 UNITS: Performed by: INTERNAL MEDICINE

## 2017-04-10 PROCEDURE — 86901 BLOOD TYPING SEROLOGIC RH(D): CPT | Performed by: THORACIC SURGERY (CARDIOTHORACIC VASCULAR SURGERY)

## 2017-04-10 PROCEDURE — 83735 ASSAY OF MAGNESIUM: CPT | Performed by: THORACIC SURGERY (CARDIOTHORACIC VASCULAR SURGERY)

## 2017-04-10 PROCEDURE — 25010000002 MIDAZOLAM PER 1 MG: Performed by: INTERNAL MEDICINE

## 2017-04-10 PROCEDURE — C1894 INTRO/SHEATH, NON-LASER: HCPCS | Performed by: INTERNAL MEDICINE

## 2017-04-10 PROCEDURE — 82803 BLOOD GASES ANY COMBINATION: CPT

## 2017-04-10 PROCEDURE — B246ZZ4 ULTRASONOGRAPHY OF RIGHT AND LEFT HEART, TRANSESOPHAGEAL: ICD-10-PCS | Performed by: INTERNAL MEDICINE

## 2017-04-10 PROCEDURE — 86901 BLOOD TYPING SEROLOGIC RH(D): CPT

## 2017-04-10 PROCEDURE — 93970 EXTREMITY STUDY: CPT

## 2017-04-10 PROCEDURE — 4A023N7 MEASUREMENT OF CARDIAC SAMPLING AND PRESSURE, LEFT HEART, PERCUTANEOUS APPROACH: ICD-10-PCS | Performed by: INTERNAL MEDICINE

## 2017-04-10 PROCEDURE — 86900 BLOOD TYPING SEROLOGIC ABO: CPT | Performed by: THORACIC SURGERY (CARDIOTHORACIC VASCULAR SURGERY)

## 2017-04-10 RX ORDER — NITROGLYCERIN 0.4 MG/1
0.4 TABLET SUBLINGUAL
Status: DISCONTINUED | OUTPATIENT
Start: 2017-04-10 | End: 2017-04-11

## 2017-04-10 RX ORDER — FAMOTIDINE 10 MG/ML
20 INJECTION, SOLUTION INTRAVENOUS
Status: COMPLETED | OUTPATIENT
Start: 2017-04-11 | End: 2017-04-11

## 2017-04-10 RX ORDER — ACETAMINOPHEN 325 MG/1
650 TABLET ORAL EVERY 4 HOURS PRN
Status: DISCONTINUED | OUTPATIENT
Start: 2017-04-10 | End: 2017-04-11

## 2017-04-10 RX ORDER — NALOXONE HCL 0.4 MG/ML
0.4 VIAL (ML) INJECTION
Status: DISCONTINUED | OUTPATIENT
Start: 2017-04-10 | End: 2017-04-11

## 2017-04-10 RX ORDER — TEMAZEPAM 7.5 MG/1
7.5 CAPSULE ORAL NIGHTLY PRN
Status: DISCONTINUED | OUTPATIENT
Start: 2017-04-10 | End: 2017-04-11

## 2017-04-10 RX ORDER — CHLORHEXIDINE GLUCONATE 0.12 MG/ML
15 RINSE ORAL EVERY 12 HOURS SCHEDULED
Status: COMPLETED | OUTPATIENT
Start: 2017-04-10 | End: 2017-04-11

## 2017-04-10 RX ORDER — ONDANSETRON 4 MG/1
4 TABLET, ORALLY DISINTEGRATING ORAL EVERY 6 HOURS PRN
Status: DISCONTINUED | OUTPATIENT
Start: 2017-04-10 | End: 2017-04-11

## 2017-04-10 RX ORDER — ONDANSETRON 2 MG/ML
4 INJECTION INTRAMUSCULAR; INTRAVENOUS EVERY 6 HOURS PRN
Status: DISCONTINUED | OUTPATIENT
Start: 2017-04-10 | End: 2017-04-11

## 2017-04-10 RX ORDER — ALPRAZOLAM 0.25 MG/1
0.25 TABLET ORAL EVERY 8 HOURS PRN
Status: DISCONTINUED | OUTPATIENT
Start: 2017-04-10 | End: 2017-04-11

## 2017-04-10 RX ORDER — FENTANYL CITRATE 50 UG/ML
INJECTION, SOLUTION INTRAMUSCULAR; INTRAVENOUS AS NEEDED
Status: DISCONTINUED | OUTPATIENT
Start: 2017-04-10 | End: 2017-04-10 | Stop reason: HOSPADM

## 2017-04-10 RX ORDER — LIDOCAINE HYDROCHLORIDE 20 MG/ML
INJECTION, SOLUTION INFILTRATION; PERINEURAL AS NEEDED
Status: DISCONTINUED | OUTPATIENT
Start: 2017-04-10 | End: 2017-04-10 | Stop reason: HOSPADM

## 2017-04-10 RX ORDER — HYDROCODONE BITARTRATE AND ACETAMINOPHEN 5; 325 MG/1; MG/1
1 TABLET ORAL EVERY 4 HOURS PRN
Status: DISCONTINUED | OUTPATIENT
Start: 2017-04-10 | End: 2017-04-11

## 2017-04-10 RX ORDER — DIPHENHYDRAMINE HCL 25 MG
25 CAPSULE ORAL NIGHTLY PRN
Status: DISCONTINUED | OUTPATIENT
Start: 2017-04-10 | End: 2017-04-11

## 2017-04-10 RX ORDER — MIDAZOLAM HYDROCHLORIDE 1 MG/ML
INJECTION INTRAMUSCULAR; INTRAVENOUS AS NEEDED
Status: DISCONTINUED | OUTPATIENT
Start: 2017-04-10 | End: 2017-04-10 | Stop reason: HOSPADM

## 2017-04-10 RX ORDER — ONDANSETRON 4 MG/1
4 TABLET, FILM COATED ORAL EVERY 6 HOURS PRN
Status: DISCONTINUED | OUTPATIENT
Start: 2017-04-10 | End: 2017-04-11

## 2017-04-10 RX ORDER — MAGNESIUM HYDROXIDE/ALUMINUM HYDROXICE/SIMETHICONE 120; 1200; 1200 MG/30ML; MG/30ML; MG/30ML
30 SUSPENSION ORAL EVERY 6 HOURS PRN
Status: DISCONTINUED | OUTPATIENT
Start: 2017-04-10 | End: 2017-04-11

## 2017-04-10 RX ORDER — SODIUM CHLORIDE 9 MG/ML
75 INJECTION, SOLUTION INTRAVENOUS CONTINUOUS
Status: DISCONTINUED | OUTPATIENT
Start: 2017-04-10 | End: 2017-04-11

## 2017-04-10 RX ORDER — MIDAZOLAM HYDROCHLORIDE 1 MG/ML
2 INJECTION INTRAMUSCULAR; INTRAVENOUS
Status: COMPLETED | OUTPATIENT
Start: 2017-04-11 | End: 2017-04-11

## 2017-04-10 RX ADMIN — AMIODARONE HYDROCHLORIDE 0.5 MG/MIN: 1.8 INJECTION, SOLUTION INTRAVENOUS at 06:16

## 2017-04-10 RX ADMIN — CHLORHEXIDINE GLUCONATE 15 ML: 1.2 RINSE ORAL at 18:56

## 2017-04-10 RX ADMIN — AMLODIPINE BESYLATE 5 MG: 5 TABLET ORAL at 10:23

## 2017-04-10 RX ADMIN — SODIUM CHLORIDE 75 ML/HR: 9 INJECTION, SOLUTION INTRAVENOUS at 18:58

## 2017-04-10 RX ADMIN — RAMIPRIL 5 MG: 5 CAPSULE ORAL at 10:23

## 2017-04-10 RX ADMIN — ATORVASTATIN CALCIUM 40 MG: 40 TABLET, FILM COATED ORAL at 10:23

## 2017-04-10 RX ADMIN — MUPIROCIN CALCIUM 1 APPLICATION: 20 OINTMENT TOPICAL at 18:56

## 2017-04-10 NOTE — ANESTHESIA PREPROCEDURE EVALUATION
Anesthesia Evaluation     Patient summary reviewed and Nursing notes reviewed      Airway   Mallampati: II  TM distance: >3 FB  Neck ROM: full  no difficulty expected  Dental    (+) lower dentures and upper dentures    Pulmonary - normal exam   (+) a smoker Former,   Cardiovascular - normal exam    Rhythm: regular  Rate: normal    (+) hypertension, CAD, dysrhythmias Atrial Flutter,       Neuro/Psych  (+) CVA, psychiatric history Anxiety,      ROS Comment: No residual  GI/Hepatic/Renal/Endo    (+)  GERD,     Musculoskeletal     Abdominal  - normal exam   Substance History - negative use     OB/GYN negative ob/gyn ROS         Other   (+) arthritis                           Anesthesia Plan    ASA 4     general   (Art line/SGC/AR)  intravenous induction   Anesthetic plan and risks discussed with patient.

## 2017-04-10 NOTE — TREATMENT PLAN
Pt has known CAD   He was fine until he had flutter -- then angina   Flutter is CTI or typical   He is now in sr -- having CABG  He WILL have post af   We might as well put him on oral amio prophylxis and lots of bB     If recurrent after the post op state I can ablate. He tolerated the flutter fine even w triple vessel disease and will surely tolerate post CABG    When ok w Diego -- start NOAC for stroke prevention in post op period

## 2017-04-10 NOTE — CONSULTS
"  Patient Care Team:  Damian Chen MD as PCP - General (Family Medicine)  Damian Chen MD as PCP - Claims Attributed - PLEASE DO NOT REMOVE  Damian Chen MD as MSSP ACO Attributed - PLEASE DO NOT REMOVE  Temo Cortez MD as Surgeon (Cardiothoracic Surgery)    Chief complaint: CAD, eval for CABG    Subjective     History of Present Illness: Mr. Santoro is a 71 y.o. male admitted for new onset a.flutter.  He originally started having chest and back pain described as \"aching\" in nature that radiated to his face with associated shortness of breath on Tues, when he arrived to the ED there was a significant wait list so he left and waited until his primary doctor office appointment.  At Dr. Chen's office on Friday, he was found to be in atrial flutter, although asymptomatic, and he was transferred to the ED.  He has since converted to NSR, and underwent a cardiac cath to evaluate his coronaries in light of his symptomology and new onset arrhythmia.  His cath demonstrated significant disease and we have been consulted to evaluate for coronary bypass surgery.     Primary history is positive for  HTN, HL, CVA with left CEA, GERD, remote diverticulitis, and kidney stones.      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 Negative for fever and unexpected weight change.   Respiratory: Positive for shortness of breath. Negative for apnea, cough, chest tightness, wheezing and stridor.    Cardiovascular: Positive for chest pain and palpitations. Negative for leg swelling.   Gastrointestinal: Negative for abdominal distention, abdominal pain, blood in stool, nausea and vomiting. "   Genitourinary: Negative for dysuria and frequency.   Musculoskeletal: Negative for arthralgias, back pain, gait problem and joint swelling.   Neurological: Negative for dizziness, seizures, syncope, light-headedness, numbness and headaches.   All other systems reviewed and are negative.       Past Medical History:   Diagnosis Date   • Anxiety    • Arthritis    • Benign essential hypertension    • CAD (coronary artery disease)    • Chest pain    • Colonic polyp    • DDD (degenerative disc disease), lumbosacral    • H/O bone density study 2013   • H/O complete eye exam 2014   • HLD (hyperlipidemia)    • Hypertension    • Lipid screening 05/31/2013   • Low back pain     physical therapy [facility] 5-12-10   • Screening for prostate cancer 07/07/2015   • Stroke      Past Surgical History:   Procedure Laterality Date   • COLONOSCOPY  2010     Family History   Problem Relation Age of Onset   • Heart disease Mother    • Heart attack Mother    • Stroke Mother    • Heart disease Father    • Heart attack Father    • Stroke Father    • Arthritis Other    • Diabetes Other    • Hypertension Other    • Kidney disease Other      stones     Social History   Substance Use Topics   • Smoking status: Former Smoker   • Smokeless tobacco: Never Used   • Alcohol use No     Prescriptions Prior to Admission   Medication Sig Dispense Refill Last Dose   • amLODIPine (NORVASC) 5 MG tablet Take 1 tablet by mouth Daily. 90 tablet 1    • aspirin 81 MG tablet Take 81 mg by mouth daily.   Taking   • clopidogrel (PLAVIX) 75 MG tablet Take 1 tablet by mouth Daily. 90 tablet 1    • Hydrocodone-Acetaminophen (VICODIN) 5-300 MG per tablet Take 1 tablet by mouth 2 (Two) Times a Day. 60 tablet 0    • nitroglycerin (NITROSTAT) 0.4 MG SL tablet Place 1 tablet under the tongue Every 5 (Five) Minutes As Needed for chest pain. Take no more than 3 doses in 15 minutes. 20 tablet 1 Taking   • omeprazole (priLOSEC) 20 MG capsule Take 1 capsule by mouth  "Daily. 90 capsule 1    • ramipril (ALTACE) 5 MG capsule Take 1 capsule by mouth Daily. 90 capsule 1    • rosuvastatin (CRESTOR) 10 MG tablet Take 1 tablet by mouth Daily. 90 tablet 1        amLODIPine 5 mg Oral Daily   aspirin 81 mg Oral Once   atorvastatin 40 mg Oral Daily   HYDROcodone-acetaminophen 0.5 tablet Oral Q12H   pantoprazole 40 mg Oral Q AM   ramipril 5 mg Oral Daily     Allergies:  Penicillins    Objective      Vital Signs  Temp:  [97.5 °F (36.4 °C)-97.7 °F (36.5 °C)] 97.7 °F (36.5 °C)  Heart Rate:  [61-66] 61  Resp:  [16-18] 16  BP: (121-122)/(65-69) 122/69    Flowsheet Rows         First Filed Value    Admission Height  68\" (172.7 cm) Documented at 04/07/2017 0935    Admission Weight  185 lb (83.9 kg) Documented at 04/07/2017 0935        68\" (172.7 cm)    Physical Exam   Constitutional: He is oriented to person, place, and time. He appears well-developed and well-nourished. No distress.   HENT:   Head: Normocephalic and atraumatic.   Eyes: Conjunctivae are normal. No scleral icterus.   Cardiovascular: Normal rate, regular rhythm and normal heart sounds.  Exam reveals no gallop and no friction rub.    No murmur heard.  Pulmonary/Chest: Effort normal and breath sounds normal. No respiratory distress. He has no wheezes. He has no rales.   Abdominal: Soft. He exhibits no distension.   Musculoskeletal: He exhibits no edema or deformity.   Neurological: He is alert and oriented to person, place, and time.   Skin: Skin is warm and dry. No rash noted. He is not diaphoretic.   Psychiatric: He has a normal mood and affect. His behavior is normal. Judgment and thought content normal.       Results Review:   Lab Results (last 24 hours)     ** No results found for the last 24 hours. **        Interpretation Summary 2D echo 4/8/17     · Estimated EF appears to be in the range of 56 - 60%.  · Left atrial cavity size is mild-to-moderately dilated.  · Mild mitral valve regurgitation is present.  · The calculated RVSP " is 25 mm Hg (normal)..  · There is a trivial pericardial effusion.           Assessment/Plan     Active Problems:    New onset atrial flutter      Assessment & Plan    CAD-----Plan for CABG in am, pending labs and preop studies  A.FLUTTER-----currently in sinus rhythm  HTN  HL  QUESTIONABLE BRIAN----evaluate as outpatient  HX CVA S/P CEA----Plavix on hold since 4/7  GERD  REMOTE KIDNEY STONES    Dr. Cortez has evaluated cath films and finds appropriate candidate for surgery in the morning.  Thank you for allowing us to participate in the care of this pleasant patient.        Flakita Villarreal, MY  04/10/17  12:37 PM

## 2017-04-10 NOTE — PLAN OF CARE
Problem: Patient Care Overview (Adult)  Goal: Plan of Care Review  Outcome: Ongoing (interventions implemented as appropriate)    04/10/17 8187   Coping/Psychosocial Response Interventions   Plan Of Care Reviewed With patient;spouse;family   Patient Care Overview   Progress no change   Outcome Evaluation   Outcome Summary/Follow up Plan pt had cath today via R radial site per Dr. Owens. Site C/D/I, and soft. Disease was found in multiple vessels. CAGB is planned for tomorrow am with Dr. Cortez. consent signed and in the chart. family at bedside. VSS. HR SB. Will continue to monitor.        Goal: Adult Individualization and Mutuality  Outcome: Ongoing (interventions implemented as appropriate)  Goal: Discharge Needs Assessment  Outcome: Ongoing (interventions implemented as appropriate)    Problem: Arrhythmia/Dysrhythmia (Symptomatic) (Adult)  Goal: Signs and Symptoms of Listed Potential Problems Will be Absent or Manageable (Arrhythmia/Dysrhythmia)  Outcome: Ongoing (interventions implemented as appropriate)    Problem: Cardiac Surgery (Adult)  Goal: Signs and Symptoms of Listed Potential Problems Will be Absent or Manageable (Cardiac Surgery)  Outcome: Ongoing (interventions implemented as appropriate)

## 2017-04-10 NOTE — PLAN OF CARE
Problem: Patient Care Overview (Adult)  Goal: Plan of Care Review  Outcome: Ongoing (interventions implemented as appropriate)    04/09/17 1605 04/10/17 0254   Coping/Psychosocial Response Interventions   Plan Of Care Reviewed With --  patient   Patient Care Overview   Progress --  improving   Outcome Evaluation   Outcome Summary/Follow up Plan Rhythm remains SB, HR 50s. Amio gtt continues. No c/o's. Plan is for a cardiac cath this morning. Will continue to monitor.  --          Problem: Arrhythmia/Dysrhythmia (Symptomatic) (Adult)  Goal: Signs and Symptoms of Listed Potential Problems Will be Absent or Manageable (Arrhythmia/Dysrhythmia)  Outcome: Ongoing (interventions implemented as appropriate)    04/10/17 0254   Arrhythmia/Dysrhythmia (Symptomatic)   Problems Assessed (Arrhythmia/Dysrhythmia) all   Problems Present (Arrhythmia/Dysrhythmia) electrophysiological conduction defect

## 2017-04-10 NOTE — PROGRESS NOTES
LOS: 3 days   Patient Care Team:  Damian Chen MD as PCP - General (Family Medicine)  Damian Chen MD as PCP - Claims Attributed - PLEASE DO NOT REMOVE  Damian Chen MD as MSSP ACO Attributed - PLEASE DO NOT REMOVE    Chief Complaint: Follow-up for new typical atrial flutter, CP, CAD    Interval History: Sinus thuan at about 49.  No atrial flutter since Saturday night.  No chest pain or SOA overnight.      Vital Signs:  Temp:  [97.5 °F (36.4 °C)-97.7 °F (36.5 °C)] 97.7 °F (36.5 °C)  Heart Rate:  [61-66] 61  Resp:  [16-18] 18  BP: (121-122)/(65-69) 122/69    Intake/Output Summary (Last 24 hours) at 04/10/17 0915  Last data filed at 04/09/17 1850   Gross per 24 hour   Intake              480 ml   Output                0 ml   Net              480 ml       Physical Exam:   General Appearance:    No acute distress, alert and oriented x4   Lungs:     Clear to auscultation bilaterally     Heart:    Regular rhythm and bradycardic rate.  No murmurs, gallops,    or rubs.   Abdomen:     Soft, non-tender, non-distended.    Extremities:   Moves all extremities well.  No clubbing, cyanosis, or edema.     Results Review:      Results from last 7 days  Lab Units 04/09/17  0422   SODIUM mmol/L 140   POTASSIUM mmol/L 3.8   CHLORIDE mmol/L 105   TOTAL CO2 mmol/L 21.7*   BUN mg/dL 15   CREATININE mg/dL 0.91   GLUCOSE mg/dL 106*   CALCIUM mg/dL 8.7       Results from last 7 days  Lab Units 04/08/17  0500 04/07/17  1820 04/07/17  1012   TROPONIN T ng/mL <0.010 <0.010 <0.010       Results from last 7 days  Lab Units 04/09/17  0422   WBC 10*3/mm3 6.63   HEMOGLOBIN g/dL 14.9   HEMATOCRIT % 45.8   PLATELETS 10*3/mm3 380               Results from last 7 days  Lab Units 04/08/17  0500   MAGNESIUM mg/dL 2.2           I reviewed the patient's new clinical results.        Assessment:  1. Paroxysmal typical atrial flutter (new)  2. Chest pain  3. Coronary artery disease (60% mid RCA and 50% LAD by cath in 2011).  4. Hypertension  5.  Hyperlipidemia  6. PVD, s/p CEA   7. EF 55-60%    Plan:  -Plan for left heart cath today.  Depending on if stenting needed, will need to determine type of anticoagulation for atrial flutter.  -He is bradycardic on the Amio gtt, but this seems to be controlling the atrial flutter well.  I will take him off the gtt today, and ask Dr. Gustafson to see for possible need for ablation.  -Echo looked good.     Further plans after cath and eval by Dr. Gustafson.     Eric Wright MD  04/10/17  9:15 AM

## 2017-04-11 ENCOUNTER — ANESTHESIA (OUTPATIENT)
Dept: PERIOP | Facility: HOSPITAL | Age: 72
End: 2017-04-11

## 2017-04-11 ENCOUNTER — APPOINTMENT (OUTPATIENT)
Dept: GENERAL RADIOLOGY | Facility: HOSPITAL | Age: 72
End: 2017-04-11

## 2017-04-11 ENCOUNTER — APPOINTMENT (OUTPATIENT)
Dept: PERIOP | Facility: HOSPITAL | Age: 72
End: 2017-04-11
Attending: ANESTHESIOLOGY

## 2017-04-11 LAB
ACT BLD: 121 SECONDS (ref 82–152)
ACT BLD: 142 SECONDS (ref 82–152)
ACT BLD: 343 SECONDS (ref 82–152)
ACT BLD: 384 SECONDS (ref 82–152)
ACT BLD: 436 SECONDS (ref 82–152)
ACT BLD: 482 SECONDS (ref 82–152)
ALBUMIN SERPL-MCNC: 3.8 G/DL (ref 3.5–5.2)
ALBUMIN SERPL-MCNC: 4 G/DL (ref 3.5–5.2)
ANION GAP SERPL CALCULATED.3IONS-SCNC: 13.8 MMOL/L
ANION GAP SERPL CALCULATED.3IONS-SCNC: 14.5 MMOL/L
ANION GAP SERPL CALCULATED.3IONS-SCNC: 15.7 MMOL/L
APTT PPP: 33.3 SECONDS (ref 22.7–35.4)
ARTERIAL PATENCY WRIST A: ABNORMAL
ATMOSPHERIC PRESS: 760.1 MMHG
ATMOSPHERIC PRESS: 761.8 MMHG
ATMOSPHERIC PRESS: 762.1 MMHG
BASE EXCESS BLDA CALC-SCNC: -1 MMOL/L (ref -5–5)
BASE EXCESS BLDA CALC-SCNC: -2 MMOL/L (ref -5–5)
BASE EXCESS BLDA CALC-SCNC: -2 MMOL/L (ref 0–2)
BASE EXCESS BLDA CALC-SCNC: -2.8 MMOL/L (ref 0–2)
BASE EXCESS BLDA CALC-SCNC: 0 MMOL/L (ref -5–5)
BASE EXCESS BLDA CALC-SCNC: 0 MMOL/L (ref 0–2)
BASE EXCESS BLDA CALC-SCNC: 3 MMOL/L (ref -5–5)
BASE EXCESS BLDA CALC-SCNC: 3 MMOL/L (ref -5–5)
BASE EXCESS BLDA CALC-SCNC: 6 MMOL/L (ref -5–5)
BASOPHILS # BLD AUTO: 0.01 10*3/MM3 (ref 0–0.2)
BASOPHILS NFR BLD AUTO: 0.1 % (ref 0–1.5)
BDY SITE: ABNORMAL
BH CV XLRA MEAS LEFT CCA RATIO VEL: -90.9 CM/SEC
BH CV XLRA MEAS LEFT DIST CCA EDV: -13.5 CM/SEC
BH CV XLRA MEAS LEFT DIST CCA PSV: -90.9 CM/SEC
BH CV XLRA MEAS LEFT DIST ICA EDV: -11.4 CM/SEC
BH CV XLRA MEAS LEFT DIST ICA PSV: -44.2 CM/SEC
BH CV XLRA MEAS LEFT ICA RATIO VEL: -64.5 CM/SEC
BH CV XLRA MEAS LEFT ICA/CCA RATIO: 0.71
BH CV XLRA MEAS LEFT MID ICA EDV: -15.5 CM/SEC
BH CV XLRA MEAS LEFT MID ICA PSV: -59.7 CM/SEC
BH CV XLRA MEAS LEFT PROX CCA EDV: 18.1 CM/SEC
BH CV XLRA MEAS LEFT PROX CCA PSV: 120 CM/SEC
BH CV XLRA MEAS LEFT PROX ECA EDV: -11.5 CM/SEC
BH CV XLRA MEAS LEFT PROX ECA PSV: -86.3 CM/SEC
BH CV XLRA MEAS LEFT PROX ICA EDV: -17 CM/SEC
BH CV XLRA MEAS LEFT PROX ICA PSV: -64.5 CM/SEC
BH CV XLRA MEAS LEFT PROX SCLA PSV: 232 CM/SEC
BH CV XLRA MEAS LEFT VERTEBRAL A EDV: 7 CM/SEC
BH CV XLRA MEAS LEFT VERTEBRAL A PSV: 36.4 CM/SEC
BH CV XLRA MEAS RIGHT CCA RATIO VEL: -83.8 CM/SEC
BH CV XLRA MEAS RIGHT DIST CCA EDV: -15.2 CM/SEC
BH CV XLRA MEAS RIGHT DIST CCA PSV: -83.8 CM/SEC
BH CV XLRA MEAS RIGHT DIST ICA EDV: -12.2 CM/SEC
BH CV XLRA MEAS RIGHT DIST ICA PSV: -54.4 CM/SEC
BH CV XLRA MEAS RIGHT ICA RATIO VEL: -127 CM/SEC
BH CV XLRA MEAS RIGHT ICA/CCA RATIO: 1.5
BH CV XLRA MEAS RIGHT MID ICA EDV: -15.3 CM/SEC
BH CV XLRA MEAS RIGHT MID ICA PSV: -80.9 CM/SEC
BH CV XLRA MEAS RIGHT PROX CCA EDV: 13.5 CM/SEC
BH CV XLRA MEAS RIGHT PROX CCA PSV: 111 CM/SEC
BH CV XLRA MEAS RIGHT PROX ECA EDV: -13.5 CM/SEC
BH CV XLRA MEAS RIGHT PROX ECA PSV: -110 CM/SEC
BH CV XLRA MEAS RIGHT PROX ICA EDV: -28.3 CM/SEC
BH CV XLRA MEAS RIGHT PROX ICA PSV: -127 CM/SEC
BH CV XLRA MEAS RIGHT PROX SCLA PSV: 135 CM/SEC
BH CV XLRA MEAS RIGHT VERTEBRAL A EDV: -14.1 CM/SEC
BH CV XLRA MEAS RIGHT VERTEBRAL A PSV: -57.5 CM/SEC
BUN BLD-MCNC: 12 MG/DL (ref 8–23)
BUN BLD-MCNC: 12 MG/DL (ref 8–23)
BUN BLD-MCNC: 13 MG/DL (ref 8–23)
BUN/CREAT SERPL: 12.4 (ref 7–25)
BUN/CREAT SERPL: 13 (ref 7–25)
BUN/CREAT SERPL: 13.2 (ref 7–25)
CA-I BLD-MCNC: 5.2 MG/DL (ref 4.6–5.4)
CA-I SERPL ISE-MCNC: 1.3 MMOL/L (ref 1.1–1.35)
CALCIUM SPEC-SCNC: 8.8 MG/DL (ref 8.6–10.5)
CALCIUM SPEC-SCNC: 8.9 MG/DL (ref 8.6–10.5)
CALCIUM SPEC-SCNC: 9.4 MG/DL (ref 8.6–10.5)
CHLORIDE SERPL-SCNC: 105 MMOL/L (ref 98–107)
CHLORIDE SERPL-SCNC: 106 MMOL/L (ref 98–107)
CHLORIDE SERPL-SCNC: 108 MMOL/L (ref 98–107)
CO2 BLDA-SCNC: 25 MMOL/L (ref 24–29)
CO2 BLDA-SCNC: 25 MMOL/L (ref 24–29)
CO2 BLDA-SCNC: 26 MMOL/L (ref 24–29)
CO2 BLDA-SCNC: 28 MMOL/L (ref 24–29)
CO2 BLDA-SCNC: 30 MMOL/L (ref 24–29)
CO2 BLDA-SCNC: 31 MMOL/L (ref 24–29)
CO2 SERPL-SCNC: 18.3 MMOL/L (ref 22–29)
CO2 SERPL-SCNC: 21.5 MMOL/L (ref 22–29)
CO2 SERPL-SCNC: 22.2 MMOL/L (ref 22–29)
CREAT BLD-MCNC: 0.91 MG/DL (ref 0.76–1.27)
CREAT BLD-MCNC: 0.92 MG/DL (ref 0.76–1.27)
CREAT BLD-MCNC: 1.05 MG/DL (ref 0.76–1.27)
DEPRECATED RDW RBC AUTO: 45.4 FL (ref 37–54)
DEPRECATED RDW RBC AUTO: 46.4 FL (ref 37–54)
DEPRECATED RDW RBC AUTO: 46.6 FL (ref 37–54)
EOSINOPHIL # BLD AUTO: 0.05 10*3/MM3 (ref 0–0.7)
EOSINOPHIL NFR BLD AUTO: 0.4 % (ref 0.3–6.2)
ERYTHROCYTE [DISTWIDTH] IN BLOOD BY AUTOMATED COUNT: 13.3 % (ref 11.5–14.5)
ERYTHROCYTE [DISTWIDTH] IN BLOOD BY AUTOMATED COUNT: 13.4 % (ref 11.5–14.5)
ERYTHROCYTE [DISTWIDTH] IN BLOOD BY AUTOMATED COUNT: 13.6 % (ref 11.5–14.5)
GFR SERPL CREATININE-BSD FRML MDRD: 69 ML/MIN/1.73
GFR SERPL CREATININE-BSD FRML MDRD: 81 ML/MIN/1.73
GFR SERPL CREATININE-BSD FRML MDRD: 82 ML/MIN/1.73
GLUCOSE BLD-MCNC: 161 MG/DL (ref 65–99)
GLUCOSE BLD-MCNC: 183 MG/DL (ref 65–99)
GLUCOSE BLD-MCNC: 98 MG/DL (ref 65–99)
GLUCOSE BLDC GLUCOMTR-MCNC: 100 MG/DL (ref 70–130)
GLUCOSE BLDC GLUCOMTR-MCNC: 122 MG/DL (ref 70–130)
GLUCOSE BLDC GLUCOMTR-MCNC: 126 MG/DL (ref 70–130)
GLUCOSE BLDC GLUCOMTR-MCNC: 128 MG/DL (ref 70–130)
GLUCOSE BLDC GLUCOMTR-MCNC: 133 MG/DL (ref 70–130)
GLUCOSE BLDC GLUCOMTR-MCNC: 138 MG/DL (ref 70–130)
GLUCOSE BLDC GLUCOMTR-MCNC: 144 MG/DL (ref 70–130)
GLUCOSE BLDC GLUCOMTR-MCNC: 155 MG/DL (ref 70–130)
GLUCOSE BLDC GLUCOMTR-MCNC: 158 MG/DL (ref 70–130)
GLUCOSE BLDC GLUCOMTR-MCNC: 160 MG/DL (ref 70–130)
GLUCOSE BLDC GLUCOMTR-MCNC: 161 MG/DL (ref 70–130)
GLUCOSE BLDC GLUCOMTR-MCNC: 161 MG/DL (ref 70–130)
GLUCOSE BLDC GLUCOMTR-MCNC: 168 MG/DL (ref 70–130)
HCO3 BLDA-SCNC: 20.7 MMOL/L (ref 22–28)
HCO3 BLDA-SCNC: 21.1 MMOL/L (ref 22–28)
HCO3 BLDA-SCNC: 23.2 MMOL/L (ref 22–26)
HCO3 BLDA-SCNC: 23.6 MMOL/L (ref 22–26)
HCO3 BLDA-SCNC: 24.2 MMOL/L (ref 22–28)
HCO3 BLDA-SCNC: 24.4 MMOL/L (ref 22–26)
HCO3 BLDA-SCNC: 26.8 MMOL/L (ref 22–26)
HCO3 BLDA-SCNC: 28.3 MMOL/L (ref 22–26)
HCO3 BLDA-SCNC: 29.9 MMOL/L (ref 22–26)
HCT VFR BLD AUTO: 34.5 % (ref 40.4–52.2)
HCT VFR BLD AUTO: 41.6 % (ref 40.4–52.2)
HCT VFR BLD AUTO: 45 % (ref 40.4–52.2)
HCT VFR BLDA CALC: 31 % (ref 38–51)
HCT VFR BLDA CALC: 31 % (ref 38–51)
HCT VFR BLDA CALC: 32 % (ref 38–51)
HCT VFR BLDA CALC: 33 % (ref 38–51)
HCT VFR BLDA CALC: 33 % (ref 38–51)
HCT VFR BLDA CALC: 43 % (ref 38–51)
HGB BLD-MCNC: 11.6 G/DL (ref 13.7–17.6)
HGB BLD-MCNC: 13.8 G/DL (ref 13.7–17.6)
HGB BLD-MCNC: 14.3 G/DL (ref 13.7–17.6)
HGB BLDA-MCNC: 10.5 G/DL (ref 12–17)
HGB BLDA-MCNC: 10.5 G/DL (ref 12–17)
HGB BLDA-MCNC: 10.9 G/DL (ref 12–17)
HGB BLDA-MCNC: 11.2 G/DL (ref 12–17)
HGB BLDA-MCNC: 11.2 G/DL (ref 12–17)
HGB BLDA-MCNC: 14.6 G/DL (ref 12–17)
HOROWITZ INDEX BLD+IHG-RTO: 100 %
HOROWITZ INDEX BLD+IHG-RTO: 40 %
HOROWITZ INDEX BLD+IHG-RTO: 40 %
IMM GRANULOCYTES # BLD: 0.05 10*3/MM3 (ref 0–0.03)
IMM GRANULOCYTES NFR BLD: 0.4 % (ref 0–0.5)
INR PPP: 1.54 (ref 0.9–1.1)
LEFT ARM BP: NORMAL MMHG
LYMPHOCYTES # BLD AUTO: 0.63 10*3/MM3 (ref 0.9–4.8)
LYMPHOCYTES NFR BLD AUTO: 5.4 % (ref 19.6–45.3)
MAGNESIUM SERPL-MCNC: 2.3 MG/DL (ref 1.6–2.4)
MAGNESIUM SERPL-MCNC: 2.8 MG/DL (ref 1.6–2.4)
MAGNESIUM SERPL-MCNC: 3.2 MG/DL (ref 1.6–2.4)
MCH RBC QN AUTO: 30.6 PG (ref 27–32.7)
MCH RBC QN AUTO: 31.2 PG (ref 27–32.7)
MCH RBC QN AUTO: 31.5 PG (ref 27–32.7)
MCHC RBC AUTO-ENTMCNC: 31.8 G/DL (ref 32.6–36.4)
MCHC RBC AUTO-ENTMCNC: 33.2 G/DL (ref 32.6–36.4)
MCHC RBC AUTO-ENTMCNC: 33.6 G/DL (ref 32.6–36.4)
MCV RBC AUTO: 93.8 FL (ref 79.8–96.2)
MCV RBC AUTO: 94.1 FL (ref 79.8–96.2)
MCV RBC AUTO: 96.2 FL (ref 79.8–96.2)
MODALITY: ABNORMAL
MONOCYTES # BLD AUTO: 0.76 10*3/MM3 (ref 0.2–1.2)
MONOCYTES NFR BLD AUTO: 6.5 % (ref 5–12)
NEUTROPHILS # BLD AUTO: 10.21 10*3/MM3 (ref 1.9–8.1)
NEUTROPHILS NFR BLD AUTO: 87.2 % (ref 42.7–76)
O2 A-A PPRESDIFF RESPIRATORY: 0.3 MMHG
PCO2 BLDA: 30.6 MM HG (ref 35–45)
PCO2 BLDA: 32 MM HG (ref 35–45)
PCO2 BLDA: 33.8 MM HG (ref 35–45)
PCO2 BLDA: 36.7 MM HG (ref 35–45)
PCO2 BLDA: 39.4 MM HG (ref 35–45)
PCO2 BLDA: 42.6 MM HG (ref 35–45)
PCO2 BLDA: 42.8 MM HG (ref 35–45)
PCO2 BLDA: 43.7 MM HG (ref 35–45)
PCO2 BLDA: 49.7 MM HG (ref 35–45)
PEEP RESPIRATORY: 5 CM[H2O]
PH BLDA: 7.34 PH UNITS (ref 7.35–7.6)
PH BLDA: 7.36 PH UNITS (ref 7.35–7.6)
PH BLDA: 7.36 PH UNITS (ref 7.35–7.6)
PH BLDA: 7.42 PH UNITS (ref 7.35–7.45)
PH BLDA: 7.43 PH UNITS (ref 7.35–7.45)
PH BLDA: 7.44 PH UNITS (ref 7.35–7.6)
PH BLDA: 7.45 PH UNITS (ref 7.35–7.45)
PH BLDA: 7.45 PH UNITS (ref 7.35–7.6)
PH BLDA: 7.46 PH UNITS (ref 7.35–7.6)
PHOSPHATE SERPL-MCNC: 1.5 MG/DL (ref 2.5–4.5)
PHOSPHATE SERPL-MCNC: 2.1 MG/DL (ref 2.5–4.5)
PLATELET # BLD AUTO: 258 10*3/MM3 (ref 140–500)
PLATELET # BLD AUTO: 305 10*3/MM3 (ref 140–500)
PLATELET # BLD AUTO: 365 10*3/MM3 (ref 140–500)
PMV BLD AUTO: 10.2 FL (ref 6–12)
PMV BLD AUTO: 10.5 FL (ref 6–12)
PMV BLD AUTO: 10.8 FL (ref 6–12)
PO2 BLDA: 193 MMHG (ref 80–105)
PO2 BLDA: 242.5 MM HG (ref 80–100)
PO2 BLDA: 396 MMHG (ref 80–105)
PO2 BLDA: 467 MMHG (ref 80–105)
PO2 BLDA: 54 MMHG (ref 80–105)
PO2 BLDA: 559 MMHG (ref 80–105)
PO2 BLDA: 71.4 MM HG (ref 80–100)
PO2 BLDA: 82.5 MM HG (ref 80–100)
PO2 BLDA: 86 MMHG (ref 80–105)
POTASSIUM BLD-SCNC: 4.3 MMOL/L (ref 3.5–5.2)
POTASSIUM BLD-SCNC: 4.3 MMOL/L (ref 3.5–5.2)
POTASSIUM BLD-SCNC: 4.4 MMOL/L (ref 3.5–5.2)
POTASSIUM BLD-SCNC: 5 MMOL/L (ref 3.5–5.2)
POTASSIUM BLDA-SCNC: 3.9 MMOL/L (ref 3.5–4.9)
POTASSIUM BLDA-SCNC: 4.5 MMOL/L (ref 3.5–4.9)
POTASSIUM BLDA-SCNC: 5.2 MMOL/L (ref 3.5–4.9)
POTASSIUM BLDA-SCNC: 5.3 MMOL/L (ref 3.5–4.9)
POTASSIUM BLDA-SCNC: 5.3 MMOL/L (ref 3.5–4.9)
POTASSIUM BLDA-SCNC: 5.9 MMOL/L (ref 3.5–4.9)
PROTHROMBIN TIME: 17.9 SECONDS (ref 11.7–14.2)
PSV: 10 CMH2O
RBC # BLD AUTO: 3.68 10*6/MM3 (ref 4.6–6)
RBC # BLD AUTO: 4.42 10*6/MM3 (ref 4.6–6)
RBC # BLD AUTO: 4.68 10*6/MM3 (ref 4.6–6)
RIGHT ARM BP: NORMAL MMHG
SAO2 % BLDA: 100 % (ref 95–98)
SAO2 % BLDA: 86 % (ref 95–98)
SAO2 % BLDA: 96 % (ref 95–98)
SAO2 % BLDCOA: 95.1 % (ref 92–99)
SAO2 % BLDCOA: 96.5 % (ref 92–99)
SAO2 % BLDCOA: 99.9 % (ref 92–99)
SET MECH RESP RATE: 13
SET MECH RESP RATE: 14
SET MECH RESP RATE: 14
SODIUM BLD-SCNC: 141 MMOL/L (ref 136–145)
SODIUM BLD-SCNC: 142 MMOL/L (ref 136–145)
SODIUM BLD-SCNC: 142 MMOL/L (ref 136–145)
TOTAL RATE: 14 BREATHS/MINUTE
TOTAL RATE: 20 BREATHS/MINUTE
VENTILATOR MODE: ABNORMAL
VT ON VENT VENT: 700 ML
VT ON VENT VENT: 700 ML
VT ON VENT VENT: 790 ML
WBC NRBC COR # BLD: 11.6 10*3/MM3 (ref 4.5–10.7)
WBC NRBC COR # BLD: 11.71 10*3/MM3 (ref 4.5–10.7)
WBC NRBC COR # BLD: 6.92 10*3/MM3 (ref 4.5–10.7)

## 2017-04-11 PROCEDURE — 33518 CABG ARTERY-VEIN TWO: CPT | Performed by: THORACIC SURGERY (CARDIOTHORACIC VASCULAR SURGERY)

## 2017-04-11 PROCEDURE — 82803 BLOOD GASES ANY COMBINATION: CPT

## 2017-04-11 PROCEDURE — 03HY32Z INSERTION OF MONITORING DEVICE INTO UPPER ARTERY, PERCUTANEOUS APPROACH: ICD-10-PCS | Performed by: ANESTHESIOLOGY

## 2017-04-11 PROCEDURE — 5A1935Z RESPIRATORY VENTILATION, LESS THAN 24 CONSECUTIVE HOURS: ICD-10-PCS | Performed by: THORACIC SURGERY (CARDIOTHORACIC VASCULAR SURGERY)

## 2017-04-11 PROCEDURE — 25010000002 HEPARIN (PORCINE) PER 1000 UNITS

## 2017-04-11 PROCEDURE — 99231 SBSQ HOSP IP/OBS SF/LOW 25: CPT | Performed by: INTERNAL MEDICINE

## 2017-04-11 PROCEDURE — 85610 PROTHROMBIN TIME: CPT | Performed by: THORACIC SURGERY (CARDIOTHORACIC VASCULAR SURGERY)

## 2017-04-11 PROCEDURE — 25010000002 ONDANSETRON PER 1 MG: Performed by: INTERNAL MEDICINE

## 2017-04-11 PROCEDURE — 25010000002 PROPOFOL 10 MG/ML EMULSION: Performed by: ANESTHESIOLOGY

## 2017-04-11 PROCEDURE — 25010000002 VANCOMYCIN PER 500 MG: Performed by: ANESTHESIOLOGY

## 2017-04-11 PROCEDURE — 94799 UNLISTED PULMONARY SVC/PX: CPT

## 2017-04-11 PROCEDURE — 25010000002 METOCLOPRAMIDE PER 10 MG: Performed by: THORACIC SURGERY (CARDIOTHORACIC VASCULAR SURGERY)

## 2017-04-11 PROCEDURE — 85025 COMPLETE CBC W/AUTO DIFF WBC: CPT | Performed by: THORACIC SURGERY (CARDIOTHORACIC VASCULAR SURGERY)

## 2017-04-11 PROCEDURE — 25010000002 INSULIN REGULAR HUMAN PER 5 UNITS: Performed by: THORACIC SURGERY (CARDIOTHORACIC VASCULAR SURGERY)

## 2017-04-11 PROCEDURE — 82962 GLUCOSE BLOOD TEST: CPT

## 2017-04-11 PROCEDURE — 25010000002 AMIODARONE IN DEXTROSE 5% 360 MG/200ML SOLUTION: Performed by: THORACIC SURGERY (CARDIOTHORACIC VASCULAR SURGERY)

## 2017-04-11 PROCEDURE — 93318 ECHO TRANSESOPHAGEAL INTRAOP: CPT

## 2017-04-11 PROCEDURE — 85027 COMPLETE CBC AUTOMATED: CPT | Performed by: THORACIC SURGERY (CARDIOTHORACIC VASCULAR SURGERY)

## 2017-04-11 PROCEDURE — 25010000003 PROTAMINE SULFATE PER 10 MG: Performed by: THORACIC SURGERY (CARDIOTHORACIC VASCULAR SURGERY)

## 2017-04-11 PROCEDURE — P9046 ALBUMIN (HUMAN), 25%, 20 ML: HCPCS

## 2017-04-11 PROCEDURE — 25010000002 MIDAZOLAM PER 1 MG: Performed by: ANESTHESIOLOGY

## 2017-04-11 PROCEDURE — 02100A9 BYPASS CORONARY ARTERY, ONE ARTERY FROM LEFT INTERNAL MAMMARY WITH AUTOLOGOUS ARTERIAL TISSUE, OPEN APPROACH: ICD-10-PCS | Performed by: THORACIC SURGERY (CARDIOTHORACIC VASCULAR SURGERY)

## 2017-04-11 PROCEDURE — P9041 ALBUMIN (HUMAN),5%, 50ML: HCPCS | Performed by: THORACIC SURGERY (CARDIOTHORACIC VASCULAR SURGERY)

## 2017-04-11 PROCEDURE — 25010000002 AMIODARONE IN DEXTROSE 5% 360 MG/200ML SOLUTION: Performed by: ANESTHESIOLOGY

## 2017-04-11 PROCEDURE — 85018 HEMOGLOBIN: CPT

## 2017-04-11 PROCEDURE — C1713 ANCHOR/SCREW BN/BN,TIS/BN: HCPCS | Performed by: THORACIC SURGERY (CARDIOTHORACIC VASCULAR SURGERY)

## 2017-04-11 PROCEDURE — 71010 HC CHEST PA OR AP: CPT

## 2017-04-11 PROCEDURE — 25010000002 PROPOFOL 1000 MG/ML EMULSION: Performed by: ANESTHESIOLOGY

## 2017-04-11 PROCEDURE — 80048 BASIC METABOLIC PNL TOTAL CA: CPT | Performed by: INTERNAL MEDICINE

## 2017-04-11 PROCEDURE — 25010000002 VANCOMYCIN: Performed by: THORACIC SURGERY (CARDIOTHORACIC VASCULAR SURGERY)

## 2017-04-11 PROCEDURE — 06BQ0ZZ EXCISION OF LEFT SAPHENOUS VEIN, OPEN APPROACH: ICD-10-PCS | Performed by: THORACIC SURGERY (CARDIOTHORACIC VASCULAR SURGERY)

## 2017-04-11 PROCEDURE — 82330 ASSAY OF CALCIUM: CPT | Performed by: THORACIC SURGERY (CARDIOTHORACIC VASCULAR SURGERY)

## 2017-04-11 PROCEDURE — 25010000002 MAGNESIUM SULFATE PER 500 MG OF MAGNESIUM: Performed by: ANESTHESIOLOGY

## 2017-04-11 PROCEDURE — 85347 COAGULATION TIME ACTIVATED: CPT

## 2017-04-11 PROCEDURE — C1751 CATH, INF, PER/CENT/MIDLINE: HCPCS | Performed by: ANESTHESIOLOGY

## 2017-04-11 PROCEDURE — 3E083GC INTRODUCTION OF OTHER THERAPEUTIC SUBSTANCE INTO HEART, PERCUTANEOUS APPROACH: ICD-10-PCS | Performed by: THORACIC SURGERY (CARDIOTHORACIC VASCULAR SURGERY)

## 2017-04-11 PROCEDURE — 25010000002 ALBUMIN HUMAN 5% PER 50 ML: Performed by: THORACIC SURGERY (CARDIOTHORACIC VASCULAR SURGERY)

## 2017-04-11 PROCEDURE — 05HM33Z INSERTION OF INFUSION DEVICE INTO RIGHT INTERNAL JUGULAR VEIN, PERCUTANEOUS APPROACH: ICD-10-PCS | Performed by: ANESTHESIOLOGY

## 2017-04-11 PROCEDURE — 0211093 BYPASS CORONARY ARTERY, TWO ARTERIES FROM CORONARY ARTERY WITH AUTOLOGOUS VENOUS TISSUE, OPEN APPROACH: ICD-10-PCS | Performed by: THORACIC SURGERY (CARDIOTHORACIC VASCULAR SURGERY)

## 2017-04-11 PROCEDURE — 33533 CABG ARTERIAL SINGLE: CPT | Performed by: THORACIC SURGERY (CARDIOTHORACIC VASCULAR SURGERY)

## 2017-04-11 PROCEDURE — 82947 ASSAY GLUCOSE BLOOD QUANT: CPT

## 2017-04-11 PROCEDURE — 25010000002 MAGNESIUM SULFATE IN D5W 1G/100ML (PREMIX) 10-5 MG/ML-% SOLUTION: Performed by: THORACIC SURGERY (CARDIOTHORACIC VASCULAR SURGERY)

## 2017-04-11 PROCEDURE — 84132 ASSAY OF SERUM POTASSIUM: CPT | Performed by: THORACIC SURGERY (CARDIOTHORACIC VASCULAR SURGERY)

## 2017-04-11 PROCEDURE — 93005 ELECTROCARDIOGRAM TRACING: CPT | Performed by: THORACIC SURGERY (CARDIOTHORACIC VASCULAR SURGERY)

## 2017-04-11 PROCEDURE — 25010000002 FENTANYL CITRATE (PF) 100 MCG/2ML SOLUTION: Performed by: ANESTHESIOLOGY

## 2017-04-11 PROCEDURE — 80069 RENAL FUNCTION PANEL: CPT | Performed by: THORACIC SURGERY (CARDIOTHORACIC VASCULAR SURGERY)

## 2017-04-11 PROCEDURE — 0BH17EZ INSERTION OF ENDOTRACHEAL AIRWAY INTO TRACHEA, VIA NATURAL OR ARTIFICIAL OPENING: ICD-10-PCS | Performed by: ANESTHESIOLOGY

## 2017-04-11 PROCEDURE — 93318 ECHO TRANSESOPHAGEAL INTRAOP: CPT | Performed by: ANESTHESIOLOGY

## 2017-04-11 PROCEDURE — C1729 CATH, DRAINAGE: HCPCS | Performed by: THORACIC SURGERY (CARDIOTHORACIC VASCULAR SURGERY)

## 2017-04-11 PROCEDURE — 33508 ENDOSCOPIC VEIN HARVEST: CPT | Performed by: THORACIC SURGERY (CARDIOTHORACIC VASCULAR SURGERY)

## 2017-04-11 PROCEDURE — 25010000002 DEXAMETHASONE PER 1 MG: Performed by: ANESTHESIOLOGY

## 2017-04-11 PROCEDURE — 85014 HEMATOCRIT: CPT

## 2017-04-11 PROCEDURE — 83735 ASSAY OF MAGNESIUM: CPT | Performed by: THORACIC SURGERY (CARDIOTHORACIC VASCULAR SURGERY)

## 2017-04-11 PROCEDURE — 25010000002 ALBUMIN HUMAN 25% PER 50 ML

## 2017-04-11 PROCEDURE — 85027 COMPLETE CBC AUTOMATED: CPT | Performed by: INTERNAL MEDICINE

## 2017-04-11 PROCEDURE — 25010000002 VANCOMYCIN PER 500 MG

## 2017-04-11 PROCEDURE — 25010000002 HEPARIN (PORCINE) PER 1000 UNITS: Performed by: ANESTHESIOLOGY

## 2017-04-11 PROCEDURE — 93010 ELECTROCARDIOGRAM REPORT: CPT | Performed by: INTERNAL MEDICINE

## 2017-04-11 PROCEDURE — 25010000002 HEPARIN (PORCINE) PER 1000 UNITS: Performed by: THORACIC SURGERY (CARDIOTHORACIC VASCULAR SURGERY)

## 2017-04-11 PROCEDURE — 85730 THROMBOPLASTIN TIME PARTIAL: CPT | Performed by: THORACIC SURGERY (CARDIOTHORACIC VASCULAR SURGERY)

## 2017-04-11 DEVICE — SS SUTURE, 3 PER SLEEVE
Type: IMPLANTABLE DEVICE | Site: STERNUM | Status: FUNCTIONAL
Brand: MYO/WIRE II

## 2017-04-11 DEVICE — CORONARY ARTERY BYPASS GRAFT MARKERS, STAINLESS STEEL, DISTAL, WITHOUT HOLDER
Type: IMPLANTABLE DEVICE | Site: HEART | Status: FUNCTIONAL
Brand: ANASTOMARK CORONARY ARTERY BYPASS GRAFT MARKERS, STAINLESS STEEL, DISTAL

## 2017-04-11 DEVICE — SS SUTURE, 4 PER SLEEVE
Type: IMPLANTABLE DEVICE | Site: STERNUM | Status: FUNCTIONAL
Brand: MYO/WIRE II

## 2017-04-11 RX ORDER — PROPOFOL 10 MG/ML
VIAL (ML) INTRAVENOUS AS NEEDED
Status: DISCONTINUED | OUTPATIENT
Start: 2017-04-11 | End: 2017-04-11 | Stop reason: SURG

## 2017-04-11 RX ORDER — HEPARIN SODIUM 5000 [USP'U]/ML
INJECTION, SOLUTION INTRAVENOUS; SUBCUTANEOUS AS NEEDED
Status: DISCONTINUED | OUTPATIENT
Start: 2017-04-11 | End: 2017-04-11 | Stop reason: HOSPADM

## 2017-04-11 RX ORDER — DEXMEDETOMIDINE HYDROCHLORIDE 4 UG/ML
.2-1.5 INJECTION, SOLUTION INTRAVENOUS CONTINUOUS PRN
Status: DISCONTINUED | OUTPATIENT
Start: 2017-04-11 | End: 2017-04-12

## 2017-04-11 RX ORDER — ALPRAZOLAM 0.25 MG/1
0.25 TABLET ORAL EVERY 8 HOURS PRN
Status: DISCONTINUED | OUTPATIENT
Start: 2017-04-11 | End: 2017-04-19 | Stop reason: HOSPADM

## 2017-04-11 RX ORDER — DEXAMETHASONE SODIUM PHOSPHATE 4 MG/ML
INJECTION, SOLUTION INTRA-ARTICULAR; INTRALESIONAL; INTRAMUSCULAR; INTRAVENOUS; SOFT TISSUE AS NEEDED
Status: DISCONTINUED | OUTPATIENT
Start: 2017-04-11 | End: 2017-04-11 | Stop reason: SURG

## 2017-04-11 RX ORDER — CYCLOBENZAPRINE HCL 10 MG
10 TABLET ORAL EVERY 8 HOURS PRN
Status: DISCONTINUED | OUTPATIENT
Start: 2017-04-12 | End: 2017-04-12

## 2017-04-11 RX ORDER — ATORVASTATIN CALCIUM 40 MG/1
40 TABLET, FILM COATED ORAL NIGHTLY
Status: DISCONTINUED | OUTPATIENT
Start: 2017-04-11 | End: 2017-04-19 | Stop reason: HOSPADM

## 2017-04-11 RX ORDER — ONDANSETRON 2 MG/ML
4 INJECTION INTRAMUSCULAR; INTRAVENOUS EVERY 6 HOURS PRN
Status: DISCONTINUED | OUTPATIENT
Start: 2017-04-11 | End: 2017-04-19 | Stop reason: HOSPADM

## 2017-04-11 RX ORDER — HEPARIN SODIUM 1000 [USP'U]/ML
INJECTION, SOLUTION INTRAVENOUS; SUBCUTANEOUS AS NEEDED
Status: DISCONTINUED | OUTPATIENT
Start: 2017-04-11 | End: 2017-04-11 | Stop reason: SURG

## 2017-04-11 RX ORDER — SODIUM CHLORIDE 9 MG/ML
30 INJECTION, SOLUTION INTRAVENOUS CONTINUOUS PRN
Status: DISCONTINUED | OUTPATIENT
Start: 2017-04-11 | End: 2017-04-12

## 2017-04-11 RX ORDER — ACETAMINOPHEN 650 MG/1
650 SUPPOSITORY RECTAL EVERY 4 HOURS PRN
Status: DISCONTINUED | OUTPATIENT
Start: 2017-04-11 | End: 2017-04-12

## 2017-04-11 RX ORDER — MAGNESIUM SULFATE HEPTAHYDRATE 500 MG/ML
INJECTION, SOLUTION INTRAMUSCULAR; INTRAVENOUS AS NEEDED
Status: DISCONTINUED | OUTPATIENT
Start: 2017-04-11 | End: 2017-04-11 | Stop reason: SURG

## 2017-04-11 RX ORDER — POTASSIUM CHLORIDE 29.8 MG/ML
20 INJECTION INTRAVENOUS
Status: DISCONTINUED | OUTPATIENT
Start: 2017-04-11 | End: 2017-04-19 | Stop reason: HOSPADM

## 2017-04-11 RX ORDER — MAGNESIUM SULFATE HEPTAHYDRATE 40 MG/ML
2 INJECTION, SOLUTION INTRAVENOUS AS NEEDED
Status: DISCONTINUED | OUTPATIENT
Start: 2017-04-11 | End: 2017-04-19 | Stop reason: HOSPADM

## 2017-04-11 RX ORDER — POTASSIUM CHLORIDE 7.45 MG/ML
10 INJECTION INTRAVENOUS
Status: DISCONTINUED | OUTPATIENT
Start: 2017-04-11 | End: 2017-04-19 | Stop reason: HOSPADM

## 2017-04-11 RX ORDER — NALOXONE HCL 0.4 MG/ML
0.4 VIAL (ML) INJECTION
Status: DISCONTINUED | OUTPATIENT
Start: 2017-04-11 | End: 2017-04-19 | Stop reason: HOSPADM

## 2017-04-11 RX ORDER — OXYCODONE HYDROCHLORIDE 5 MG/1
10 TABLET ORAL EVERY 4 HOURS PRN
Status: DISCONTINUED | OUTPATIENT
Start: 2017-04-11 | End: 2017-04-19 | Stop reason: HOSPADM

## 2017-04-11 RX ORDER — SODIUM CHLORIDE 9 MG/ML
30 INJECTION, SOLUTION INTRAVENOUS CONTINUOUS
Status: DISCONTINUED | OUTPATIENT
Start: 2017-04-11 | End: 2017-04-12

## 2017-04-11 RX ORDER — MORPHINE SULFATE 2 MG/ML
1 INJECTION, SOLUTION INTRAMUSCULAR; INTRAVENOUS EVERY 4 HOURS PRN
Status: DISCONTINUED | OUTPATIENT
Start: 2017-04-11 | End: 2017-04-19 | Stop reason: HOSPADM

## 2017-04-11 RX ORDER — PROMETHAZINE HYDROCHLORIDE 25 MG/1
12.5 TABLET ORAL EVERY 6 HOURS PRN
Status: DISCONTINUED | OUTPATIENT
Start: 2017-04-11 | End: 2017-04-19 | Stop reason: HOSPADM

## 2017-04-11 RX ORDER — SODIUM CHLORIDE 0.9 % (FLUSH) 0.9 %
30 SYRINGE (ML) INJECTION ONCE AS NEEDED
Status: DISCONTINUED | OUTPATIENT
Start: 2017-04-11 | End: 2017-04-19 | Stop reason: HOSPADM

## 2017-04-11 RX ORDER — BISACODYL 5 MG/1
10 TABLET, DELAYED RELEASE ORAL DAILY PRN
Status: DISCONTINUED | OUTPATIENT
Start: 2017-04-11 | End: 2017-04-19 | Stop reason: HOSPADM

## 2017-04-11 RX ORDER — PANTOPRAZOLE SODIUM 40 MG/10ML
40 INJECTION, POWDER, LYOPHILIZED, FOR SOLUTION INTRAVENOUS
Status: DISCONTINUED | OUTPATIENT
Start: 2017-04-11 | End: 2017-04-12

## 2017-04-11 RX ORDER — METOCLOPRAMIDE HYDROCHLORIDE 5 MG/ML
10 INJECTION INTRAMUSCULAR; INTRAVENOUS EVERY 6 HOURS
Status: DISCONTINUED | OUTPATIENT
Start: 2017-04-11 | End: 2017-04-12

## 2017-04-11 RX ORDER — SENNA AND DOCUSATE SODIUM 50; 8.6 MG/1; MG/1
2 TABLET, FILM COATED ORAL NIGHTLY
Status: DISCONTINUED | OUTPATIENT
Start: 2017-04-12 | End: 2017-04-19 | Stop reason: HOSPADM

## 2017-04-11 RX ORDER — ASPIRIN 81 MG/1
81 TABLET ORAL DAILY
Status: DISCONTINUED | OUTPATIENT
Start: 2017-04-12 | End: 2017-04-19 | Stop reason: HOSPADM

## 2017-04-11 RX ORDER — FENTANYL CITRATE 50 UG/ML
INJECTION, SOLUTION INTRAMUSCULAR; INTRAVENOUS AS NEEDED
Status: DISCONTINUED | OUTPATIENT
Start: 2017-04-11 | End: 2017-04-11 | Stop reason: SURG

## 2017-04-11 RX ORDER — DOPAMINE HYDROCHLORIDE 160 MG/100ML
2-20 INJECTION, SOLUTION INTRAVENOUS CONTINUOUS PRN
Status: DISCONTINUED | OUTPATIENT
Start: 2017-04-11 | End: 2017-04-12

## 2017-04-11 RX ORDER — ACETAMINOPHEN 325 MG/1
650 TABLET ORAL EVERY 4 HOURS PRN
Status: DISCONTINUED | OUTPATIENT
Start: 2017-04-11 | End: 2017-04-19 | Stop reason: HOSPADM

## 2017-04-11 RX ORDER — NITROGLYCERIN 20 MG/100ML
INJECTION INTRAVENOUS CONTINUOUS PRN
Status: DISCONTINUED | OUTPATIENT
Start: 2017-04-11 | End: 2017-04-11 | Stop reason: SURG

## 2017-04-11 RX ORDER — NITROGLYCERIN 5 MG/ML
INJECTION, SOLUTION INTRAVENOUS AS NEEDED
Status: DISCONTINUED | OUTPATIENT
Start: 2017-04-11 | End: 2017-04-11 | Stop reason: SURG

## 2017-04-11 RX ORDER — BISACODYL 10 MG
10 SUPPOSITORY, RECTAL RECTAL DAILY PRN
Status: DISCONTINUED | OUTPATIENT
Start: 2017-04-12 | End: 2017-04-19 | Stop reason: HOSPADM

## 2017-04-11 RX ORDER — CHLORHEXIDINE GLUCONATE 0.12 MG/ML
15 RINSE ORAL EVERY 12 HOURS
Status: DISCONTINUED | OUTPATIENT
Start: 2017-04-11 | End: 2017-04-13

## 2017-04-11 RX ORDER — HYDROCODONE BITARTRATE AND ACETAMINOPHEN 5; 325 MG/1; MG/1
2 TABLET ORAL EVERY 4 HOURS PRN
Status: DISCONTINUED | OUTPATIENT
Start: 2017-04-11 | End: 2017-04-12

## 2017-04-11 RX ORDER — ALBUMIN, HUMAN INJ 5% 5 %
1500 SOLUTION INTRAVENOUS AS NEEDED
Status: DISCONTINUED | OUTPATIENT
Start: 2017-04-11 | End: 2017-04-12

## 2017-04-11 RX ORDER — MAGNESIUM SULFATE HEPTAHYDRATE 40 MG/ML
4 INJECTION, SOLUTION INTRAVENOUS AS NEEDED
Status: DISCONTINUED | OUTPATIENT
Start: 2017-04-11 | End: 2017-04-19 | Stop reason: HOSPADM

## 2017-04-11 RX ORDER — POTASSIUM CHLORIDE 1.5 G/1.77G
40 POWDER, FOR SOLUTION ORAL AS NEEDED
Status: DISCONTINUED | OUTPATIENT
Start: 2017-04-11 | End: 2017-04-19 | Stop reason: HOSPADM

## 2017-04-11 RX ORDER — PROTAMINE SULFATE 10 MG/ML
INJECTION, SOLUTION INTRAVENOUS AS NEEDED
Status: DISCONTINUED | OUTPATIENT
Start: 2017-04-11 | End: 2017-04-11 | Stop reason: HOSPADM

## 2017-04-11 RX ORDER — FUROSEMIDE 10 MG/ML
40 INJECTION INTRAMUSCULAR; INTRAVENOUS ONCE AS NEEDED
Status: DISCONTINUED | OUTPATIENT
Start: 2017-04-11 | End: 2017-04-12

## 2017-04-11 RX ORDER — NITROGLYCERIN 20 MG/100ML
5-200 INJECTION INTRAVENOUS CONTINUOUS PRN
Status: DISCONTINUED | OUTPATIENT
Start: 2017-04-11 | End: 2017-04-12

## 2017-04-11 RX ORDER — POTASSIUM CHLORIDE 750 MG/1
40 CAPSULE, EXTENDED RELEASE ORAL AS NEEDED
Status: DISCONTINUED | OUTPATIENT
Start: 2017-04-11 | End: 2017-04-19 | Stop reason: HOSPADM

## 2017-04-11 RX ORDER — ACETAMINOPHEN 10 MG/ML
1000 INJECTION, SOLUTION INTRAVENOUS ONCE
Status: COMPLETED | OUTPATIENT
Start: 2017-04-11 | End: 2017-04-11

## 2017-04-11 RX ORDER — PANTOPRAZOLE SODIUM 40 MG/1
40 TABLET, DELAYED RELEASE ORAL
Status: DISCONTINUED | OUTPATIENT
Start: 2017-04-11 | End: 2017-04-19 | Stop reason: HOSPADM

## 2017-04-11 RX ORDER — MIDAZOLAM HYDROCHLORIDE 1 MG/ML
2 INJECTION INTRAMUSCULAR; INTRAVENOUS
Status: DISCONTINUED | OUTPATIENT
Start: 2017-04-11 | End: 2017-04-12

## 2017-04-11 RX ORDER — VECURONIUM BROMIDE 1 MG/ML
INJECTION, POWDER, LYOPHILIZED, FOR SOLUTION INTRAVENOUS AS NEEDED
Status: DISCONTINUED | OUTPATIENT
Start: 2017-04-11 | End: 2017-04-11 | Stop reason: SURG

## 2017-04-11 RX ORDER — SUFENTANIL CITRATE 50 UG/ML
INJECTION EPIDURAL; INTRAVENOUS AS NEEDED
Status: DISCONTINUED | OUTPATIENT
Start: 2017-04-11 | End: 2017-04-11 | Stop reason: SURG

## 2017-04-11 RX ORDER — PAPAVERINE HYDROCHLORIDE 30 MG/ML
INJECTION INTRAMUSCULAR; INTRAVENOUS AS NEEDED
Status: DISCONTINUED | OUTPATIENT
Start: 2017-04-11 | End: 2017-04-11 | Stop reason: HOSPADM

## 2017-04-11 RX ORDER — PROMETHAZINE HYDROCHLORIDE 25 MG/ML
12.5 INJECTION, SOLUTION INTRAMUSCULAR; INTRAVENOUS EVERY 6 HOURS PRN
Status: DISCONTINUED | OUTPATIENT
Start: 2017-04-11 | End: 2017-04-19 | Stop reason: HOSPADM

## 2017-04-11 RX ORDER — AMINOCAPROIC ACID 250 MG/ML
INJECTION, SOLUTION INTRAVENOUS AS NEEDED
Status: DISCONTINUED | OUTPATIENT
Start: 2017-04-11 | End: 2017-04-11 | Stop reason: SURG

## 2017-04-11 RX ADMIN — FENTANYL CITRATE 50 MCG: 50 INJECTION INTRAMUSCULAR; INTRAVENOUS at 06:44

## 2017-04-11 RX ADMIN — ALBUMIN (HUMAN) 250 ML: 12.5 INJECTION, SOLUTION INTRAVENOUS at 13:27

## 2017-04-11 RX ADMIN — SUFENTANIL CITRATE 50 MCG: 50 INJECTION, SOLUTION EPIDURAL; INTRAVENOUS at 10:01

## 2017-04-11 RX ADMIN — ATORVASTATIN CALCIUM 40 MG: 40 TABLET, FILM COATED ORAL at 20:52

## 2017-04-11 RX ADMIN — ACETAMINOPHEN 1000 MG: 10 INJECTION, SOLUTION INTRAVENOUS at 11:08

## 2017-04-11 RX ADMIN — AMIODARONE HYDROCHLORIDE 0.5 MG/MIN: 1.8 INJECTION, SOLUTION INTRAVENOUS at 23:16

## 2017-04-11 RX ADMIN — PANTOPRAZOLE SODIUM 40 MG: 40 INJECTION, POWDER, FOR SOLUTION INTRAVENOUS at 11:08

## 2017-04-11 RX ADMIN — FENTANYL CITRATE 50 MCG: 50 INJECTION INTRAMUSCULAR; INTRAVENOUS at 06:47

## 2017-04-11 RX ADMIN — SUFENTANIL CITRATE 50 MCG: 50 INJECTION, SOLUTION EPIDURAL; INTRAVENOUS at 07:46

## 2017-04-11 RX ADMIN — PROPOFOL 40 MG: 10 INJECTION, EMULSION INTRAVENOUS at 08:28

## 2017-04-11 RX ADMIN — MAGNESIUM SULFATE HEPTAHYDRATE 1 G: 1 INJECTION, SOLUTION INTRAVENOUS at 20:42

## 2017-04-11 RX ADMIN — SUFENTANIL CITRATE 25 MCG: 50 INJECTION, SOLUTION EPIDURAL; INTRAVENOUS at 06:59

## 2017-04-11 RX ADMIN — PROPOFOL 40 MG: 10 INJECTION, EMULSION INTRAVENOUS at 07:54

## 2017-04-11 RX ADMIN — METOCLOPRAMIDE 10 MG: 5 INJECTION, SOLUTION INTRAMUSCULAR; INTRAVENOUS at 22:58

## 2017-04-11 RX ADMIN — MIDAZOLAM HYDROCHLORIDE 5 MG: 1 INJECTION, SOLUTION INTRAMUSCULAR; INTRAVENOUS at 09:16

## 2017-04-11 RX ADMIN — MIDAZOLAM HYDROCHLORIDE 2 MG: 1 INJECTION, SOLUTION INTRAMUSCULAR; INTRAVENOUS at 06:44

## 2017-04-11 RX ADMIN — MAGNESIUM SULFATE HEPTAHYDRATE 2 G: 500 INJECTION, SOLUTION INTRAMUSCULAR; INTRAVENOUS at 09:16

## 2017-04-11 RX ADMIN — VANCOMYCIN HYDROCHLORIDE 1250 MG: 1 INJECTION, POWDER, LYOPHILIZED, FOR SOLUTION INTRAVENOUS at 19:35

## 2017-04-11 RX ADMIN — HYDROCODONE BITARTRATE AND ACETAMINOPHEN 1 TABLET: 5; 325 TABLET ORAL at 22:06

## 2017-04-11 RX ADMIN — SUFENTANIL CITRATE 25 MCG: 50 INJECTION, SOLUTION EPIDURAL; INTRAVENOUS at 07:43

## 2017-04-11 RX ADMIN — SUFENTANIL CITRATE 50 MCG: 50 INJECTION, SOLUTION EPIDURAL; INTRAVENOUS at 07:49

## 2017-04-11 RX ADMIN — PROPOFOL 40 MG: 10 INJECTION, EMULSION INTRAVENOUS at 08:25

## 2017-04-11 RX ADMIN — NITROGLYCERIN 0.2 MCG/KG/MIN: 20 INJECTION INTRAVENOUS at 07:45

## 2017-04-11 RX ADMIN — ALPRAZOLAM 0.25 MG: 0.25 TABLET ORAL at 17:47

## 2017-04-11 RX ADMIN — MUPIROCIN CALCIUM 1 APPLICATION: 20 OINTMENT TOPICAL at 06:09

## 2017-04-11 RX ADMIN — VECURONIUM BROMIDE 8 MG: 1 INJECTION, POWDER, LYOPHILIZED, FOR SOLUTION INTRAVENOUS at 06:59

## 2017-04-11 RX ADMIN — SODIUM CHLORIDE 2.4 UNITS/HR: 9 INJECTION, SOLUTION INTRAVENOUS at 20:00

## 2017-04-11 RX ADMIN — METOCLOPRAMIDE 10 MG: 5 INJECTION, SOLUTION INTRAMUSCULAR; INTRAVENOUS at 11:08

## 2017-04-11 RX ADMIN — SODIUM CHLORIDE 5 MG/HR: 9 INJECTION, SOLUTION INTRAVENOUS at 08:43

## 2017-04-11 RX ADMIN — EPHEDRINE SULFATE 10 MG: 50 INJECTION INTRAMUSCULAR; INTRAVENOUS; SUBCUTANEOUS at 06:45

## 2017-04-11 RX ADMIN — DEXAMETHASONE SODIUM PHOSPHATE 8 MG: 4 INJECTION, SOLUTION INTRAMUSCULAR; INTRAVENOUS at 07:37

## 2017-04-11 RX ADMIN — MIDAZOLAM HYDROCHLORIDE 1 MG: 1 INJECTION, SOLUTION INTRAMUSCULAR; INTRAVENOUS at 06:47

## 2017-04-11 RX ADMIN — AMIODARONE HYDROCHLORIDE 1 MG/MIN: 1.8 INJECTION, SOLUTION INTRAVENOUS at 08:39

## 2017-04-11 RX ADMIN — HEPARIN SODIUM 25000 UNITS: 1000 INJECTION, SOLUTION INTRAVENOUS; SUBCUTANEOUS at 08:17

## 2017-04-11 RX ADMIN — CHLORHEXIDINE GLUCONATE 15 ML: 1.2 RINSE ORAL at 06:08

## 2017-04-11 RX ADMIN — SODIUM CHLORIDE: 9 INJECTION, SOLUTION INTRAVENOUS at 06:39

## 2017-04-11 RX ADMIN — NITROGLYCERIN 100 MCG: 5 INJECTION, SOLUTION INTRAVENOUS at 10:07

## 2017-04-11 RX ADMIN — NITROGLYCERIN 100 MCG: 5 INJECTION, SOLUTION INTRAVENOUS at 07:49

## 2017-04-11 RX ADMIN — MIDAZOLAM HYDROCHLORIDE 2 MG: 1 INJECTION, SOLUTION INTRAMUSCULAR; INTRAVENOUS at 06:59

## 2017-04-11 RX ADMIN — NITROGLYCERIN 100 MCG: 5 INJECTION, SOLUTION INTRAVENOUS at 10:17

## 2017-04-11 RX ADMIN — METOPROLOL TARTRATE 12.5 MG: 25 TABLET ORAL at 06:09

## 2017-04-11 RX ADMIN — ACETAMINOPHEN 650 MG: 325 TABLET ORAL at 17:46

## 2017-04-11 RX ADMIN — VECURONIUM BROMIDE 2 MG: 1 INJECTION, POWDER, LYOPHILIZED, FOR SOLUTION INTRAVENOUS at 08:21

## 2017-04-11 RX ADMIN — SUFENTANIL CITRATE 50 MCG: 50 INJECTION, SOLUTION EPIDURAL; INTRAVENOUS at 09:59

## 2017-04-11 RX ADMIN — PROPOFOL 50 MCG/KG/MIN: 10 INJECTION, EMULSION INTRAVENOUS at 08:17

## 2017-04-11 RX ADMIN — AMIODARONE HYDROCHLORIDE 0.5 MG/MIN: 1.8 INJECTION, SOLUTION INTRAVENOUS at 14:40

## 2017-04-11 RX ADMIN — NITROGLYCERIN 100 MCG: 5 INJECTION, SOLUTION INTRAVENOUS at 07:45

## 2017-04-11 RX ADMIN — VECURONIUM BROMIDE 2 MG: 1 INJECTION, POWDER, LYOPHILIZED, FOR SOLUTION INTRAVENOUS at 07:45

## 2017-04-11 RX ADMIN — AMINOCAPROIC ACID 10 G: 250 INJECTION, SOLUTION INTRAVENOUS at 10:04

## 2017-04-11 RX ADMIN — NITROGLYCERIN 100 MCG: 5 INJECTION, SOLUTION INTRAVENOUS at 07:54

## 2017-04-11 RX ADMIN — DEXMEDETOMIDINE HYDROCHLORIDE 0.2 MCG/KG/HR: 4 INJECTION, SOLUTION INTRAVENOUS at 11:31

## 2017-04-11 RX ADMIN — SODIUM CHLORIDE 15 ML/HR: 9 INJECTION, SOLUTION INTRAVENOUS at 11:12

## 2017-04-11 RX ADMIN — PROPOFOL 80 MG: 10 INJECTION, EMULSION INTRAVENOUS at 06:59

## 2017-04-11 RX ADMIN — FAMOTIDINE 20 MG: 10 INJECTION, SOLUTION INTRAVENOUS at 06:09

## 2017-04-11 RX ADMIN — VECURONIUM BROMIDE 4 MG: 1 INJECTION, POWDER, LYOPHILIZED, FOR SOLUTION INTRAVENOUS at 09:16

## 2017-04-11 RX ADMIN — OXYCODONE HYDROCHLORIDE 10 MG: 5 TABLET ORAL at 16:53

## 2017-04-11 RX ADMIN — NITROGLYCERIN 100 MCG: 5 INJECTION, SOLUTION INTRAVENOUS at 07:52

## 2017-04-11 RX ADMIN — ONDANSETRON 4 MG: 2 INJECTION INTRAMUSCULAR; INTRAVENOUS at 09:54

## 2017-04-11 RX ADMIN — VECURONIUM BROMIDE 4 MG: 1 INJECTION, POWDER, LYOPHILIZED, FOR SOLUTION INTRAVENOUS at 10:07

## 2017-04-11 RX ADMIN — AMINOCAPROIC ACID 10 G: 250 INJECTION, SOLUTION INTRAVENOUS at 07:38

## 2017-04-11 RX ADMIN — CYCLOBENZAPRINE HYDROCHLORIDE 10 MG: 10 TABLET, FILM COATED ORAL at 19:35

## 2017-04-11 RX ADMIN — VANCOMYCIN HYDROCHLORIDE 1 G: 1 INJECTION, POWDER, LYOPHILIZED, FOR SOLUTION INTRAVENOUS at 07:24

## 2017-04-11 RX ADMIN — ALBUMIN (HUMAN) 500 ML: 12.5 INJECTION, SOLUTION INTRAVENOUS at 15:24

## 2017-04-11 NOTE — ANESTHESIA PROCEDURE NOTES
Central Line    Patient location during procedure: OR  Indications: vascular access  Staff  Anesthesiologist: LINDA BOOGIE  Preanesthetic Checklist  Completed: patient identified, site marked, surgical consent, pre-op evaluation, timeout performed, IV checked, risks and benefits discussed and monitors and equipment checked  Central Line Prep  Sterile Tech:cap, gloves, gown, mask and sterile barriers  Prep: chloraprep  Patient monitoring: blood pressure monitoring, continuous pulse oximetry and EKG  Central Line Procedure  Location:internal jugular  Catheter Type:[place]  Guidance:landmark technique  Assessment  Post procedure:biopatch applied, line sutured and occlusive dressing applied  Assessement:blood return through all ports and free fluid flow  Complications:no  Patient Tolerance:patient tolerated the procedure well with no apparent complications

## 2017-04-11 NOTE — ANESTHESIA PROCEDURE NOTES
Arterial Line    Patient location during procedure: OR  Start time: 4/11/2017 6:50 AM  Stop Time:4/11/2017 6:53 AM       Line placed for hemodynamic monitoring.  Performed By   Anesthesiologist: LINDA BOOGIE  Preanesthetic Checklist  Completed: patient identified, site marked, surgical consent, pre-op evaluation, timeout performed, IV checked, risks and benefits discussed and monitors and equipment checked  Arterial Line Prep   Sterile Tech: cap, gloves and mask  Prep: ChloraPrep  Patient monitoring: blood pressure monitoring, continuous pulse oximetry and EKG  Arterial Line Procedure   Laterality:left  Location:  radial artery  Catheter size: 20 G   Guidance: palpation technique  Number of attempts: 2  Successful placement: yes          Post Assessment   Dressing Type: biopatch applied, occlusive dressing applied, secured with tape and wrist guard applied.   Complications no  Circ/Move/Sens Assessment: normal and unchanged.   Patient Tolerance: patient tolerated the procedure well with no apparent complications

## 2017-04-11 NOTE — PROGRESS NOTES
LOS: 4 days   Patient Care Team:  Damian Chen MD as PCP - General (Family Medicine)  Damian Chen MD as PCP - Claims Attributed - PLEASE DO NOT REMOVE  Damian Chen MD as MSSP ACO Attributed - PLEASE DO NOT REMOVE  Temo Cortez MD as Surgeon (Cardiothoracic Surgery)    Chief Complaint: Follow-up left main CAD, new typical atrial flutter.    Interval History: Left main disease noted on cath - to OR for CABG this AM.  NSR currently.  No CP or SOA overnight.      Vital Signs:  Temp:  [97.5 °F (36.4 °C)-98.1 °F (36.7 °C)] 97.6 °F (36.4 °C)  Heart Rate:  [50-66] 59  Resp:  [16-20] 20  BP: (117-143)/(62-81) 143/73    Intake/Output Summary (Last 24 hours) at 04/11/17 0727  Last data filed at 04/10/17 1833   Gross per 24 hour   Intake              200 ml   Output              300 ml   Net             -100 ml       Physical Exam:   General Appearance:    No acute distress, alert and oriented x4   Lungs:     Clear to auscultation bilaterally     Heart:    Regular rhythm and normal rate.  No murmurs, gallops, or       rubs.   Abdomen:     Soft, non-tender, non-distended.    Extremities:   Moves all extremities well.  No clubbing, cyanosis, or edema.     Results Review:      Results from last 7 days  Lab Units 04/11/17  0512   SODIUM mmol/L 141   POTASSIUM mmol/L 4.3   CHLORIDE mmol/L 105   TOTAL CO2 mmol/L 22.2   BUN mg/dL 12   CREATININE mg/dL 0.92   GLUCOSE mg/dL 98   CALCIUM mg/dL 8.9       Results from last 7 days  Lab Units 04/08/17  0500 04/07/17  1820 04/07/17  1012   TROPONIN T ng/mL <0.010 <0.010 <0.010       Results from last 7 days  Lab Units 04/11/17  0512   WBC 10*3/mm3 6.92   HEMOGLOBIN g/dL 14.3   HEMATOCRIT % 45.0   PLATELETS 10*3/mm3 365           Results from last 7 days  Lab Units 04/10/17  1520   CHOLESTEROL mg/dL 138       Results from last 7 days  Lab Units 04/10/17  1520   MAGNESIUM mg/dL 2.2       Results from last 7 days  Lab Units 04/10/17  1520   CHOLESTEROL mg/dL 138   TRIGLYCERIDES  mg/dL 68   HDL CHOL mg/dL 43       I reviewed the patient's new clinical results.        Assessment:  1. Paroxysmal typical atrial flutter (new)  2. Unstable angina  3. Severe CAD (left main)  4. Hypertension  5. Hyperlipidemia  6. PVD, s/p CEA   7. EF 55-60%    Plan:  -To OR for CABG with Dr. Cortez this AM.   -He will very likely have atrial fib and flutter post-op.  Will need Amio prophylaxis afterwards.  If atrial flutter is major issue, he can be ablated by Dr. Gustafson.  -Will follow post-op.      Eric Wright MD  04/11/17  7:27 AM

## 2017-04-11 NOTE — OP NOTE
[facility] CARDIAC SURGERY OP NOTE    Preop Diagnosis: Severe coronary artery disease.  Critical left main stenosis.  Atrial flutter converted to sinus rhythm.  Hypertension.  Moderate bloody pericardial effusion.    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    Indications: This patient was admitted hospital with atrial flutter.  He was found to have critical coronary disease with severe left main stenosis and left dominant system.  He converted to sinus rhythm on amiodarone.  Dr. israel is following.  Operation is advisable prolong life and relieve symptoms.  The STS Risk was discussed with the patient. I have discussed the [place] [employer] Infection situation with the patient and family. They understand and wish to proceed.    Procedure: CABG ×3.  Left internal mammary graft to proximal LAD.  Vein graft to third marginal branch circumflex which is PDA in the left dominant system.  Vein graft to first marginal branch circumflex.  Temporary cardiopulmonary bypass.  Antegrade cold blood cardioplegia.  Endoscopic vein harvest left greater saphenous vein.  Neurologic monitoring.  Transesophageal echo by anesthesia.    Surgeon: Temo Cortez MD    Assisistant: Amanda Alegre [facility]    Anesthesia: GET    Findings : On transesophageal echo the heart and cardiac chambers were normal.  He had ascending aortic calcification that was posterior on the right lateral wall the aorta but we were able to clamp and an area that was free of calcification.  The innominate was calcified and we cannulated distally in the arch so we could get a good area to cross-clamp.  The vein was 4 and half millimeters and excellent.  The KIARA was a 2-1/2 mm vessel but the intima was very fragile.  He tended to be fragile all over.  The LAD was 2-1/2 mm.  The marginal branch was 2-1/2 mm and the PDA was 2 mm.  There was a thin  serosanguineous per cardial effusion.      Operative Procedure: A primary median sternotomy was made while the left greater saphenous vein was harvested with the endoscope.  The mammary was dissected off left chest wall with the harmonic scalpel and was skeletonized.  Cardio pulmonary bypass was established for 78 minutes drifting 34°C at appropriate flow rates.  The initial cross-clamp time was 36 minutes and we gave 800 cc of antegrade cold blood cardioplegia repeating doses every 10-15 minutes to good effect.  2 veins were anastomosed the ascending aorta with 6-0 Prolene and marked with washers and then sewn to the third marginal branch and the first marginal branch with 7-0 Prolene.  The mammary was brought through an incision left side the pericardium and sewn inside the LAD with 7-0 Prolene.  There was strong suction on the aortic needle vent the cross-clamp was released.  Then noticed that the mammary had split at the heel of the anastomosis and this could not be repaired so we gave another dose a cardiac plegia for 10 minutes and the mammary was reanastomosed with 7-0 Prolene.  This worked well.  There was no tension on the anastomosis.  There was strong suction on the aortic needle vent the cross-clamp was released and complete de-airing performed.  Cardio pulmonary bypass was then weaned and discontinued.  Decannulation was affected usual devices were placed and hemostasis was obtained.  3 chest tubes were inserted and we put a posterior Donta tube because of the effusion.  We applied vancomycin enriched platelet rich plasma and the sternum was closed with double #8 double #6 and #7 wire.  Skin was closed as usual.  Exparel was injected in the incision for local pain control.  The grafts lay nicely and all the anastomoses were hemostatic.  The sponge needle and instrument counts noted be correct he went the open-heart recovery room in good condition.    Complications: None    Tubes: 3    Epicardial Wires:  3    Blood Loss: 100 ml    Aortic X time: 46 min    CPB time: 78 min     Specimen: None      Condition: Good to open heart recovery      Patient Care Team:  Damian Chen MD as PCP - General (Family Medicine)  Damian Chen MD as PCP - Claims Attributed - PLEASE DO NOT REMOVE  Damian Chen MD as MSSP ACO Attributed - PLEASE DO NOT REMOVE  Temo Cortez MD as Surgeon (Cardiothoracic Surgery)        Temo Cortez MD  4/11/2017  10:29 AM          EMR Dragon/Transcription disclaimer:   Much of this encounter note is an electronic transcription/translation of spoken language to printed text. The electronic translation of spoken language may permit erroneous, or at times, nonsensical words or phrases to be inadvertently transcribed; Although I have reviewed the note for such errors, some may still exist.

## 2017-04-11 NOTE — ANESTHESIA PROCEDURE NOTES
Airway  Airway not difficult    General Information and Staff    Patient location during procedure: OR  Anesthesiologist: LINDA BOOGIE    Indications and Patient Condition  Indications for airway management: airway protection    Preoxygenated: yes  MILS maintained throughout  Mask difficulty assessment: 1 - vent by mask    Final Airway Details  Final airway type: endotracheal airway      Successful airway: ETT  Cuffed: yes   Successful intubation technique: direct laryngoscopy  Endotracheal tube insertion site: oral  Blade: Mj  Blade size: #3  ETT size: 8.5 mm  Cormack-Lehane Classification: grade I - full view of glottis  Placement verified by: chest auscultation and capnometry   Measured from: lips  ETT to lips (cm): 22  Number of attempts at approach: 1

## 2017-04-11 NOTE — ANESTHESIA POSTPROCEDURE EVALUATION
Patient: Madhu Santoro    Procedure Summary     Date Anesthesia Start Anesthesia Stop Room / Location    04/11/17 0639 1057  [facility] / [facility]       Procedure Diagnosis Surgeon Provider    AR STERNOTOMY CORONARY ARTERY BYPASS GRAFT TIMES 3 USING LEFT INTERNAL MAMMARY ARTERY AND LEFT GREATER SAPHENOUS VEIN GRAFT PER ENDOSCOPIC VEIN HARVESTING AND PRP  (N/A Chest) Coronary artery disease involving native coronary artery of native heart without angina pectoris  (Coronary artery disease involving native coronary artery of native heart without angina pectoris [I25.10]) MD Arron Covarrubias MD          Anesthesia Type: general  Last vitals  BP      Temp      Pulse 80 (04/11/17 1049)   Resp 14 (04/11/17 1049)    SpO2 100 % (04/11/17 1049)      Post Anesthesia Care and Evaluation    Patient location during evaluation: PACU  Patient participation: complete - patient cannot participate  Level of consciousness: obtunded/minimal responses  Pain management: adequate  Airway patency: patent  Anesthetic complications: No anesthetic complications  PONV Status: NA  Cardiovascular status: acceptable  Respiratory status: acceptable, ETT, intubated and ventilator  Hydration status: acceptable

## 2017-04-11 NOTE — PLAN OF CARE
Problem: Patient Care Overview (Adult)  Goal: Plan of Care Review  Outcome: Ongoing (interventions implemented as appropriate)    04/11/17 0031   Coping/Psychosocial Response Interventions   Plan Of Care Reviewed With patient   Patient Care Overview   Progress no change       Goal: Adult Individualization and Mutuality  Outcome: Ongoing (interventions implemented as appropriate)  Goal: Discharge Needs Assessment  Outcome: Ongoing (interventions implemented as appropriate)    04/11/17 0031   Discharge Needs Assessment   Concerns To Be Addressed no discharge needs identified   Readmission Within The Last 30 Days no previous admission in last 30 days   Equipment Needed After Discharge none   Discharge Disposition still a patient   Current Health   Anticipated Changes Related to Illness none   Self-Care   Equipment Currently Used at Home none   Living Environment   Transportation Available family or friend will provide         Problem: Arrhythmia/Dysrhythmia (Symptomatic) (Adult)  Goal: Signs and Symptoms of Listed Potential Problems Will be Absent or Manageable (Arrhythmia/Dysrhythmia)  Outcome: Ongoing (interventions implemented as appropriate)    04/11/17 0031   Arrhythmia/Dysrhythmia (Symptomatic)   Problems Assessed (Arrhythmia/Dysrhythmia) all   Problems Present (Arrhythmia/Dysrhythmia) none         Problem: Cardiac Surgery (Adult)  Goal: Signs and Symptoms of Listed Potential Problems Will be Absent or Manageable (Cardiac Surgery)  Outcome: Ongoing (interventions implemented as appropriate)

## 2017-04-11 NOTE — ANESTHESIA PROCEDURE NOTES
Procedure Performed: Emergent/Open-Heart Anesthesia AR     Start Time:        End Time:        General Procedure Information  AR Placed for monitoring purposes only -- This is not a diagnostic AR

## 2017-04-11 NOTE — ANESTHESIA PROCEDURE NOTES
Central Line    Patient location during procedure: OR  Indications: vascular access  Staff  Anesthesiologist: LINDA BOOGIE  Preanesthetic Checklist  Completed: patient identified, site marked, surgical consent, pre-op evaluation, timeout performed, IV checked, risks and benefits discussed and monitors and equipment checked  Central Line Prep  Sterile Tech:cap, gloves, gown, mask and sterile barriers  Prep: chloraprep  Patient monitoring: blood pressure monitoring, continuous pulse oximetry and EKG  Central Line Procedure  Laterality:right  Location:internal jugular  Catheter Type:Cordis  Catheter Size:8.5 Fr  Guidance:landmark technique  Assessment  Post procedure:biopatch applied, line sutured and occlusive dressing applied  Assessement:blood return through all ports, free fluid flow and Ap Test  Complications:no  Patient Tolerance:patient tolerated the procedure well with no apparent complications

## 2017-04-12 ENCOUNTER — APPOINTMENT (OUTPATIENT)
Dept: GENERAL RADIOLOGY | Facility: HOSPITAL | Age: 72
End: 2017-04-12

## 2017-04-12 LAB
ALBUMIN SERPL-MCNC: 4 G/DL (ref 3.5–5.2)
ANION GAP SERPL CALCULATED.3IONS-SCNC: 13.6 MMOL/L
BASOPHILS # BLD AUTO: 0 10*3/MM3 (ref 0–0.2)
BASOPHILS NFR BLD AUTO: 0 % (ref 0–1.5)
BUN BLD-MCNC: 11 MG/DL (ref 8–23)
BUN/CREAT SERPL: 15.3 (ref 7–25)
CALCIUM SPEC-SCNC: 8.4 MG/DL (ref 8.6–10.5)
CHLORIDE SERPL-SCNC: 106 MMOL/L (ref 98–107)
CO2 SERPL-SCNC: 21.4 MMOL/L (ref 22–29)
CREAT BLD-MCNC: 0.72 MG/DL (ref 0.76–1.27)
DEPRECATED RDW RBC AUTO: 47.8 FL (ref 37–54)
EOSINOPHIL # BLD AUTO: 0 10*3/MM3 (ref 0–0.7)
EOSINOPHIL NFR BLD AUTO: 0 % (ref 0.3–6.2)
ERYTHROCYTE [DISTWIDTH] IN BLOOD BY AUTOMATED COUNT: 13.9 % (ref 11.5–14.5)
GFR SERPL CREATININE-BSD FRML MDRD: 107 ML/MIN/1.73
GLUCOSE BLD-MCNC: 121 MG/DL (ref 65–99)
GLUCOSE BLDC GLUCOMTR-MCNC: 112 MG/DL (ref 70–130)
GLUCOSE BLDC GLUCOMTR-MCNC: 114 MG/DL (ref 70–130)
GLUCOSE BLDC GLUCOMTR-MCNC: 115 MG/DL (ref 70–130)
GLUCOSE BLDC GLUCOMTR-MCNC: 117 MG/DL (ref 70–130)
GLUCOSE BLDC GLUCOMTR-MCNC: 122 MG/DL (ref 70–130)
GLUCOSE BLDC GLUCOMTR-MCNC: 127 MG/DL (ref 70–130)
GLUCOSE BLDC GLUCOMTR-MCNC: 130 MG/DL (ref 70–130)
GLUCOSE BLDC GLUCOMTR-MCNC: 97 MG/DL (ref 70–130)
HCT VFR BLD AUTO: 37.4 % (ref 40.4–52.2)
HGB BLD-MCNC: 12.3 G/DL (ref 13.7–17.6)
IMM GRANULOCYTES # BLD: 0.04 10*3/MM3 (ref 0–0.03)
IMM GRANULOCYTES NFR BLD: 0.3 % (ref 0–0.5)
INR PPP: 1.32 (ref 0.9–1.1)
LYMPHOCYTES # BLD AUTO: 0.63 10*3/MM3 (ref 0.9–4.8)
LYMPHOCYTES NFR BLD AUTO: 4.5 % (ref 19.6–45.3)
MAGNESIUM SERPL-MCNC: 2.5 MG/DL (ref 1.6–2.4)
MCH RBC QN AUTO: 30.9 PG (ref 27–32.7)
MCHC RBC AUTO-ENTMCNC: 32.9 G/DL (ref 32.6–36.4)
MCV RBC AUTO: 94 FL (ref 79.8–96.2)
MONOCYTES # BLD AUTO: 1.54 10*3/MM3 (ref 0.2–1.2)
MONOCYTES NFR BLD AUTO: 10.9 % (ref 5–12)
NEUTROPHILS # BLD AUTO: 11.88 10*3/MM3 (ref 1.9–8.1)
NEUTROPHILS NFR BLD AUTO: 84.3 % (ref 42.7–76)
NRBC BLD MANUAL-RTO: 0 /100 WBC (ref 0–0)
PHOSPHATE SERPL-MCNC: 3.2 MG/DL (ref 2.5–4.5)
PLATELET # BLD AUTO: 306 10*3/MM3 (ref 140–500)
PMV BLD AUTO: 10.4 FL (ref 6–12)
POTASSIUM BLD-SCNC: 4.4 MMOL/L (ref 3.5–5.2)
PROTHROMBIN TIME: 15.9 SECONDS (ref 11.7–14.2)
RBC # BLD AUTO: 3.98 10*6/MM3 (ref 4.6–6)
SODIUM BLD-SCNC: 141 MMOL/L (ref 136–145)
WBC NRBC COR # BLD: 14.09 10*3/MM3 (ref 4.5–10.7)

## 2017-04-12 PROCEDURE — 85610 PROTHROMBIN TIME: CPT | Performed by: THORACIC SURGERY (CARDIOTHORACIC VASCULAR SURGERY)

## 2017-04-12 PROCEDURE — 25010000002 FUROSEMIDE PER 20 MG: Performed by: PHYSICIAN ASSISTANT

## 2017-04-12 PROCEDURE — 83735 ASSAY OF MAGNESIUM: CPT | Performed by: THORACIC SURGERY (CARDIOTHORACIC VASCULAR SURGERY)

## 2017-04-12 PROCEDURE — 25010000002 METOCLOPRAMIDE PER 10 MG: Performed by: THORACIC SURGERY (CARDIOTHORACIC VASCULAR SURGERY)

## 2017-04-12 PROCEDURE — 94799 UNLISTED PULMONARY SVC/PX: CPT

## 2017-04-12 PROCEDURE — 93005 ELECTROCARDIOGRAM TRACING: CPT | Performed by: THORACIC SURGERY (CARDIOTHORACIC VASCULAR SURGERY)

## 2017-04-12 PROCEDURE — 71010 HC CHEST PA OR AP: CPT

## 2017-04-12 PROCEDURE — 80069 RENAL FUNCTION PANEL: CPT | Performed by: THORACIC SURGERY (CARDIOTHORACIC VASCULAR SURGERY)

## 2017-04-12 PROCEDURE — 97162 PT EVAL MOD COMPLEX 30 MIN: CPT

## 2017-04-12 PROCEDURE — 99232 SBSQ HOSP IP/OBS MODERATE 35: CPT | Performed by: INTERNAL MEDICINE

## 2017-04-12 PROCEDURE — 97110 THERAPEUTIC EXERCISES: CPT

## 2017-04-12 PROCEDURE — 85025 COMPLETE CBC W/AUTO DIFF WBC: CPT | Performed by: THORACIC SURGERY (CARDIOTHORACIC VASCULAR SURGERY)

## 2017-04-12 PROCEDURE — 25010000002 MORPHINE PER 10 MG: Performed by: THORACIC SURGERY (CARDIOTHORACIC VASCULAR SURGERY)

## 2017-04-12 PROCEDURE — 82962 GLUCOSE BLOOD TEST: CPT

## 2017-04-12 PROCEDURE — 92610 EVALUATE SWALLOWING FUNCTION: CPT

## 2017-04-12 PROCEDURE — 25010000002 VANCOMYCIN: Performed by: THORACIC SURGERY (CARDIOTHORACIC VASCULAR SURGERY)

## 2017-04-12 PROCEDURE — 25010000002 ENOXAPARIN PER 10 MG: Performed by: THORACIC SURGERY (CARDIOTHORACIC VASCULAR SURGERY)

## 2017-04-12 PROCEDURE — 93010 ELECTROCARDIOGRAM REPORT: CPT | Performed by: INTERNAL MEDICINE

## 2017-04-12 RX ORDER — NICOTINE POLACRILEX 4 MG
15 LOZENGE BUCCAL
Status: DISCONTINUED | OUTPATIENT
Start: 2017-04-12 | End: 2017-04-15

## 2017-04-12 RX ORDER — HYDROCODONE BITARTRATE AND ACETAMINOPHEN 7.5; 325 MG/1; MG/1
2 TABLET ORAL EVERY 4 HOURS PRN
Status: DISCONTINUED | OUTPATIENT
Start: 2017-04-12 | End: 2017-04-19 | Stop reason: HOSPADM

## 2017-04-12 RX ORDER — FUROSEMIDE 10 MG/ML
40 INJECTION INTRAMUSCULAR; INTRAVENOUS ONCE
Status: COMPLETED | OUTPATIENT
Start: 2017-04-12 | End: 2017-04-12

## 2017-04-12 RX ORDER — CYCLOBENZAPRINE HCL 10 MG
10 TABLET ORAL 3 TIMES DAILY PRN
Status: DISCONTINUED | OUTPATIENT
Start: 2017-04-12 | End: 2017-04-18

## 2017-04-12 RX ORDER — DEXTROSE MONOHYDRATE 25 G/50ML
25 INJECTION, SOLUTION INTRAVENOUS
Status: DISCONTINUED | OUTPATIENT
Start: 2017-04-12 | End: 2017-04-15

## 2017-04-12 RX ORDER — AMIODARONE HYDROCHLORIDE 200 MG/1
400 TABLET ORAL EVERY 12 HOURS SCHEDULED
Status: DISCONTINUED | OUTPATIENT
Start: 2017-04-12 | End: 2017-04-13

## 2017-04-12 RX ADMIN — AMIODARONE HYDROCHLORIDE 400 MG: 200 TABLET ORAL at 09:09

## 2017-04-12 RX ADMIN — HYDROCODONE BITARTRATE AND ACETAMINOPHEN 2 TABLET: 5; 325 TABLET ORAL at 13:03

## 2017-04-12 RX ADMIN — ALPRAZOLAM 0.25 MG: 0.25 TABLET ORAL at 12:18

## 2017-04-12 RX ADMIN — CYCLOBENZAPRINE HYDROCHLORIDE 10 MG: 10 TABLET, FILM COATED ORAL at 15:09

## 2017-04-12 RX ADMIN — CYCLOBENZAPRINE HYDROCHLORIDE 10 MG: 10 TABLET, FILM COATED ORAL at 07:38

## 2017-04-12 RX ADMIN — VANCOMYCIN HYDROCHLORIDE 1250 MG: 1 INJECTION, POWDER, LYOPHILIZED, FOR SOLUTION INTRAVENOUS at 07:21

## 2017-04-12 RX ADMIN — HYDROCODONE BITARTRATE AND ACETAMINOPHEN 2 TABLET: 5; 325 TABLET ORAL at 03:59

## 2017-04-12 RX ADMIN — HYDROCODONE BITARTRATE AND ACETAMINOPHEN 1 TABLET: 7.5; 325 TABLET ORAL at 23:52

## 2017-04-12 RX ADMIN — AMIODARONE HYDROCHLORIDE 400 MG: 200 TABLET ORAL at 20:00

## 2017-04-12 RX ADMIN — METOPROLOL TARTRATE 12.5 MG: 25 TABLET ORAL at 20:01

## 2017-04-12 RX ADMIN — METOCLOPRAMIDE 10 MG: 5 INJECTION, SOLUTION INTRAMUSCULAR; INTRAVENOUS at 05:20

## 2017-04-12 RX ADMIN — MORPHINE SULFATE 1 MG: 2 INJECTION, SOLUTION INTRAMUSCULAR; INTRAVENOUS at 17:20

## 2017-04-12 RX ADMIN — HYDROCODONE BITARTRATE AND ACETAMINOPHEN 1 TABLET: 7.5; 325 TABLET ORAL at 17:56

## 2017-04-12 RX ADMIN — ASPIRIN 81 MG: 81 TABLET ORAL at 07:25

## 2017-04-12 RX ADMIN — ENOXAPARIN SODIUM 40 MG: 40 INJECTION SUBCUTANEOUS at 20:01

## 2017-04-12 RX ADMIN — DOCUSATE SODIUM,SENNOSIDES 2 TABLET: 50; 8.6 TABLET, FILM COATED ORAL at 20:01

## 2017-04-12 RX ADMIN — HYDROCODONE BITARTRATE AND ACETAMINOPHEN 1 TABLET: 5; 325 TABLET ORAL at 00:01

## 2017-04-12 RX ADMIN — ATORVASTATIN CALCIUM 40 MG: 40 TABLET, FILM COATED ORAL at 20:01

## 2017-04-12 RX ADMIN — FUROSEMIDE 40 MG: 10 INJECTION, SOLUTION INTRAMUSCULAR; INTRAVENOUS at 10:47

## 2017-04-12 RX ADMIN — PANTOPRAZOLE SODIUM 40 MG: 40 TABLET, DELAYED RELEASE ORAL at 05:23

## 2017-04-12 RX ADMIN — HYDROCODONE BITARTRATE AND ACETAMINOPHEN 2 TABLET: 5; 325 TABLET ORAL at 09:01

## 2017-04-12 NOTE — PROGRESS NOTES
" LOS: 5 days   Patient Care Team:  Damian Chen MD as PCP - General (Family Medicine)  Damian Chen MD as PCP - Claims Attributed - PLEASE DO NOT REMOVE  Damian Chen MD as MSSP ACO Attributed - PLEASE DO NOT REMOVE  Temo Cortez MD as Surgeon (Cardiothoracic Surgery)    Chief Complaint: f/u cabg    Subjective  Extubated awake and alert, sitting in chair, complains pain is mostly in his back    Vital Signs  Heart Rate:  [57-80] 57  Resp:  [12-20] 14  BP: ()/(57-72) 112/59  Arterial Line BP: ()/(48-64) 136/64  FiO2 (%):  [40 %-100 %] 40 %  Body mass index is 27.83 kg/(m^2).    Intake/Output Summary (Last 24 hours) at 04/12/17 0815  Last data filed at 04/12/17 0721   Gross per 24 hour   Intake          4505.77 ml   Output             5069 ml   Net          -563.23 ml     I/O this shift:  In: 250 [IV Piggyback:250]  Out: 0     Chest tube drainage last 12 hours, mediastinal 140cc, pleural 40cc    Last 3 weights    04/11/17  1309 04/11/17  1652 04/11/17  1934   Weight: 183 lb (83 kg) 183 lb (83 kg) 183 lb (83 kg)         Objective:  General Appearance:  In no acute distress.    Vital signs: (most recent): Blood pressure 112/59, pulse 57, temperature 97.6 °F (36.4 °C), temperature source Oral, resp. rate 14, height 68\" (172.7 cm), weight 183 lb (83 kg), SpO2 96 %.    Output: Producing urine.    Lungs:  Normal respiratory rate and normal effort.  There are decreased breath sounds.    Heart: Bradycardia.  Regular rhythm.  (Tele: SR with HR 53)  Abdomen: Abdomen is soft and non-distended.  There is no abdominal tenderness.     Extremities: There is no dependent edema.    Neurological: Patient is alert and oriented to person, place and time.    Skin:  Warm and dry.  (Incisions c/d/i)          Results Review:    WBC WBC   Date Value Ref Range Status   04/12/2017 14.09 (H) 4.50 - 10.70 10*3/mm3 Final   04/11/2017 11.60 (H) 4.50 - 10.70 10*3/mm3 Final   04/11/2017 11.71 (H) 4.50 - 10.70 10*3/mm3 Final "   04/11/2017 6.92 4.50 - 10.70 10*3/mm3 Final   04/10/2017 7.45 4.50 - 10.70 10*3/mm3 Final      HGB Hemoglobin   Date Value Ref Range Status   04/12/2017 12.3 (L) 13.7 - 17.6 g/dL Final   04/11/2017 13.8 13.7 - 17.6 g/dL Final   04/11/2017 11.6 (L) 13.7 - 17.6 g/dL Final   04/11/2017 10.9 (L) 12.0 - 17.0 g/dL Final   04/11/2017 11.2 (L) 12.0 - 17.0 g/dL Final   04/11/2017 11.2 (L) 12.0 - 17.0 g/dL Final   04/11/2017 10.5 (L) 12.0 - 17.0 g/dL Final   04/11/2017 10.5 (L) 12.0 - 17.0 g/dL Final   04/11/2017 14.6 12.0 - 17.0 g/dL Final   04/11/2017 14.3 13.7 - 17.6 g/dL Final   04/10/2017 15.1 13.7 - 17.6 g/dL Final      HCT Hematocrit   Date Value Ref Range Status   04/12/2017 37.4 (L) 40.4 - 52.2 % Final   04/11/2017 41.6 40.4 - 52.2 % Final   04/11/2017 34.5 (L) 40.4 - 52.2 % Final   04/11/2017 32 (L) 38 - 51 % Final   04/11/2017 33 (L) 38 - 51 % Final   04/11/2017 33 (L) 38 - 51 % Final   04/11/2017 31 (L) 38 - 51 % Final   04/11/2017 31 (L) 38 - 51 % Final   04/11/2017 43 38 - 51 % Final   04/11/2017 45.0 40.4 - 52.2 % Final   04/10/2017 45.8 40.4 - 52.2 % Final      Platelets Platelets   Date Value Ref Range Status   04/12/2017 306 140 - 500 10*3/mm3 Final   04/11/2017 258 140 - 500 10*3/mm3 Final   04/11/2017 305 140 - 500 10*3/mm3 Final   04/11/2017 365 140 - 500 10*3/mm3 Final   04/10/2017 382 140 - 500 10*3/mm3 Final        PT/INR:    Protime   Date Value Ref Range Status   04/12/2017 15.9 (H) 11.7 - 14.2 Seconds Final   04/11/2017 17.9 (H) 11.7 - 14.2 Seconds Final   /  INR   Date Value Ref Range Status   04/12/2017 1.32 (H) 0.90 - 1.10 Final   04/11/2017 1.54 (H) 0.90 - 1.10 Final       Sodium Sodium   Date Value Ref Range Status   04/12/2017 141 136 - 145 mmol/L Final   04/11/2017 142 136 - 145 mmol/L Final   04/11/2017 142 136 - 145 mmol/L Final   04/11/2017 141 136 - 145 mmol/L Final   04/10/2017 141 136 - 145 mmol/L Final      Potassium Potassium   Date Value Ref Range Status   04/12/2017 4.4 3.5 - 5.2  mmol/L Final   04/11/2017 4.4 3.5 - 5.2 mmol/L Final   04/11/2017 4.3 3.5 - 5.2 mmol/L Final   04/11/2017 5.0 3.5 - 5.2 mmol/L Final   04/11/2017 4.3 3.5 - 5.2 mmol/L Final   04/10/2017 4.2 3.5 - 5.2 mmol/L Final      Chloride Chloride   Date Value Ref Range Status   04/12/2017 106 98 - 107 mmol/L Final   04/11/2017 108 (H) 98 - 107 mmol/L Final   04/11/2017 106 98 - 107 mmol/L Final   04/11/2017 105 98 - 107 mmol/L Final   04/10/2017 104 98 - 107 mmol/L Final      Bicarbonate CO2   Date Value Ref Range Status   04/12/2017 21.4 (L) 22.0 - 29.0 mmol/L Final   04/11/2017 18.3 (L) 22.0 - 29.0 mmol/L Final   04/11/2017 21.5 (L) 22.0 - 29.0 mmol/L Final   04/11/2017 22.2 22.0 - 29.0 mmol/L Final   04/10/2017 23.9 22.0 - 29.0 mmol/L Final      BUN BUN   Date Value Ref Range Status   04/12/2017 11 8 - 23 mg/dL Final   04/11/2017 13 8 - 23 mg/dL Final   04/11/2017 12 8 - 23 mg/dL Final   04/11/2017 12 8 - 23 mg/dL Final   04/10/2017 12 8 - 23 mg/dL Final      Creatinine Creatinine   Date Value Ref Range Status   04/12/2017 0.72 (L) 0.76 - 1.27 mg/dL Final   04/11/2017 1.05 0.76 - 1.27 mg/dL Final   04/11/2017 0.91 0.76 - 1.27 mg/dL Final   04/11/2017 0.92 0.76 - 1.27 mg/dL Final   04/10/2017 0.89 0.76 - 1.27 mg/dL Final      Calcium Calcium   Date Value Ref Range Status   04/12/2017 8.4 (L) 8.6 - 10.5 mg/dL Final   04/11/2017 8.8 8.6 - 10.5 mg/dL Final   04/11/2017 9.4 8.6 - 10.5 mg/dL Final   04/11/2017 8.9 8.6 - 10.5 mg/dL Final   04/10/2017 9.2 8.6 - 10.5 mg/dL Final      Magnesium Magnesium   Date Value Ref Range Status   04/12/2017 2.5 (H) 1.6 - 2.4 mg/dL Final   04/11/2017 2.3 1.6 - 2.4 mg/dL Final   04/11/2017 2.8 (H) 1.6 - 2.4 mg/dL Final   04/11/2017 3.2 (H) 1.6 - 2.4 mg/dL Final   04/10/2017 2.2 1.6 - 2.4 mg/dL Final            aspirin 81 mg Oral Daily   atorvastatin 40 mg Oral Nightly   chlorhexidine 15 mL Mouth/Throat Q12H   enoxaparin 40 mg Subcutaneous Q24H   magnesium sulfate 1 g Intravenous Q8H    metoclopramide 10 mg Intravenous Q6H   metoprolol tartrate 12.5 mg Oral Q12H   mupirocin 1 application Each Nare Daily   mupirocin  Each Nare BID   pantoprazole 40 mg Oral Q AM   sennosides-docusate sodium 2 tablet Oral Nightly   vancomycin 15 mg/kg Intravenous Q12H       amiodarone 1 mg/min Last Rate: 0.5 mg/min (04/11/17 2316)   clevidipine 2-32 mg/hr    DOPamine 2-20 mcg/kg/min    EPINEPHrine 0.02-0.3 mcg/kg/min    insulin regular infusion 1 unit/mL (CCU use) 0-50 Units/hr Last Rate: Stopped (04/12/17 0447)   milrinone 0.25-0.75 mcg/kg/min    niCARdipine 5-15 mg/hr Last Rate: Stopped (04/11/17 1326)   nitroglycerin 5-200 mcg/min    norepinephrine 0.02-0.3 mcg/kg/min    phenylephrine 0.5-3 mcg/kg/min    sodium chloride 30 mL/hr Last Rate: 15 mL/hr (04/11/17 1900)   sodium chloride 30 mL/hr Last Rate: Stopped (04/12/17 0447)   vasopressin 0.02-0.1 Units/min            Patient Active Problem List   Diagnosis Code   • Anxiety F41.9   • Arthritis M19.90   • CAD (coronary artery disease) I25.10   • HLD (hyperlipidemia) E78.5   • Benign essential hypertension I10   • DDD (degenerative disc disease), lumbosacral M51.37   • Stroke I63.9   • Screening for prostate cancer Z12.5   • Hypertension I10   • Gastroesophageal reflux disease K21.9   • Hyperlipidemia E78.5   • New onset atrial flutter I48.92       Assessment & Plan  POD 1 CABG  Sinus thuan HR 53  amio gtt, no pressors or inotropes  Chest tube drainage medistinal 130cc , pleural 30cc overnight  CXR with some congestion    Plan:  Encourage IS and OOB with PT  Convert amio to po per cardiology  Diurese  Keep chest tubes for now, d/c later today      WINSTON Villegas  04/12/17  8:15 AM

## 2017-04-12 NOTE — PLAN OF CARE
Problem: Patient Care Overview (Adult)  Goal: Plan of Care Review  Outcome: Ongoing (interventions implemented as appropriate)    04/12/17 0325   Coping/Psychosocial Response Interventions   Plan Of Care Reviewed With patient   Patient Care Overview   Progress improving   Outcome Evaluation   Outcome Summary/Follow up Plan Pt improving post op night zero from open heart. Successfully extubated to n/c. No gtts. SB in 50s under pacer. C/o pain. Continue to monitor closely. Move to CVU in AM after seen by Dr. Cortez.       Goal: Adult Individualization and Mutuality  Outcome: Ongoing (interventions implemented as appropriate)    04/12/17 0325   Individualization   Patient Specific Goals SBP , pain control, wean O2.   Patient Specific Interventions Meds and treatments as ordered. Pulmonary hygeine.         Problem: Cardiac Surgery (Adult)  Goal: Signs and Symptoms of Listed Potential Problems Will be Absent or Manageable (Cardiac Surgery)  Outcome: Ongoing (interventions implemented as appropriate)    04/12/17 0325   Cardiac Surgery   Problems Assessed (Cardiac Surgery) all   Problems Present (Cardiac Surgery) pain;acute renal impairment;dysrhythmia/arrhythmia;electrolyte imbalance;fluid imbalance;hypoxia/hypoxemia;situational response;hemodynamic instability         Problem: Fall Risk (Adult)  Goal: Identify Related Risk Factors and Signs and Symptoms  Outcome: Outcome(s) achieved Date Met:  04/12/17 04/12/17 0325   Fall Risk   Fall Risk: Related Risk Factors age-related changes;culprit medication(s);gait/mobility problems;homeostatic imbalance;polypharmacy;slipper/uneven surfaces;sleep pattern alteration;environment unfamiliar   Fall Risk: Signs and Symptoms presence of risk factors       Goal: Absence of Falls  Outcome: Ongoing (interventions implemented as appropriate)    04/12/17 0325   Fall Risk (Adult)   Absence of Falls making progress toward outcome         Problem: Skin Integrity Impairment,  Risk/Actual (Adult)  Goal: Identify Related Risk Factors and Signs and Symptoms  Outcome: Outcome(s) achieved Date Met:  04/12/17 04/12/17 0325   Skin Integrity Impairment, Risk/Actual   Skin Integrity Impairment, Risk/Actual: Related Risk Factors age extremes;edema;metabolic imbalance;surgery/procedure   Signs and Symptoms (Skin Integrity Impairment) edema       Goal: Skin Integrity/Wound Healing  Outcome: Ongoing (interventions implemented as appropriate)    04/12/17 0325   Skin Integrity Impairment, Risk/Actual (Adult)   Skin Integrity/Wound Healing making progress toward outcome

## 2017-04-12 NOTE — SIGNIFICANT NOTE
04/12/17 1450   Rehab Treatment   Discipline occupational therapist   Rehab Evaluation   Evaluation Not Performed patient/family declined evaluation  (Pt declines OT due to pain. Nsg aware of pt c/o pain 9914)

## 2017-04-12 NOTE — PROGRESS NOTES
Acute Care - Physical Therapy Initial Evaluation  [facility]     Patient Name: Madhu Santoro  : 1945  MRN: [id number]  Today's Date: 2017   Onset of Illness/Injury or Date of Surgery Date: 17            Admit Date: 2017     Visit Dx:    ICD-10-CM ICD-9-CM   1. New onset atrial flutter I48.92 427.32   2. Coronary artery disease involving native coronary artery of native heart without angina pectoris I25.10 414.01   3. Generalized weakness R53.1 780.79     Patient Active Problem List   Diagnosis   • Anxiety   • Arthritis   • CAD (coronary artery disease)   • HLD (hyperlipidemia)   • Benign essential hypertension   • DDD (degenerative disc disease), lumbosacral   • Stroke   • Screening for prostate cancer   • Hypertension   • Gastroesophageal reflux disease   • Hyperlipidemia   • New onset atrial flutter     Past Medical History:   Diagnosis Date   • Anxiety    • Arthritis    • Benign essential hypertension    • CAD (coronary artery disease)    • Chest pain    • Colonic polyp    • DDD (degenerative disc disease), lumbosacral    • H/O bone density study    • H/O complete eye exam    • HLD (hyperlipidemia)    • Hypertension    • Lipid screening 2013   • Low back pain     [facility]   • Screening for prostate cancer 2015   • Stroke      Past Surgical History:   Procedure Laterality Date   • CARDIAC CATHETERIZATION N/A 4/10/2017    Procedure: Left Heart Cath;  Surgeon: Marjorie Healy MD;  Location: [facility] INVASIVE LOCATION;  Service:    • CARDIAC CATHETERIZATION N/A 4/10/2017    Procedure: Coronary angiography;  Surgeon: Marjorie Healy MD;  Location: [facility] CATH INVASIVE LOCATION;  Service:    • CARDIAC CATHETERIZATION N/A 4/10/2017    Procedure: Left ventriculography;  Surgeon: Marjorie Healy MD;  Location: [facility] CATH INVASIVE LOCATION;  Service:    • COLONOSCOPY            PT ASSESSMENT (last 72 hours)      PT Evaluation        04/12/17 0848 04/11/17 0031    Rehab Evaluation    Document Type evaluation  -EM     Subjective Information agree to therapy;complains of;pain  -EM     General Information    Onset of Illness/Injury or Date of Surgery Date 04/11/17  -EM     General Observations  male sitting up in chair, awake and alert  -EM     Pertinent History Of Current Problem CABG x3  -EM     Precautions/Limitations cardiac precautions;sternal precautions  -EM     Prior Level of Function independent:;community mobility  -EM     Equipment Currently Used at Home none  -EM none  -TW    Plans/Goals Discussed With patient  -EM     Living Environment    Lives With spouse  -EM     Living Arrangements house  -EM     Home Accessibility no concerns  -EM     Transportation Available  family or friend will provide  -TW    Clinical Impression    Patient/Family Goals Statement go home  -EM     Criteria for Skilled Therapeutic Interventions Met yes;treatment indicated  -EM     Impairments Found (describe specific impairments) gait, locomotion, and balance  -EM     Rehab Potential good, to achieve stated therapy goals  -EM     Vital Signs    Pre Systolic BP Rehab 109  -EM     Pre Treatment Diastolic BP 59  -EM     Post Systolic BP Rehab 141  -EM     Post Treatment Diastolic BP 75  -EM     Pretreatment Heart Rate (beats/min) 54  -EM     Posttreatment Heart Rate (beats/min) 65  -EM     Pre SpO2 (%) 96  -EM     O2 Delivery Pre Treatment supplemental O2  -EM     Post SpO2 (%) 96  -EM     O2 Delivery Post Treatment supplemental O2  -EM     Pain Assessment    Pain Assessment 0-10  -EM     Pain Score 6  -EM     Pain Location Back  -EM     Pain Intervention(s) Medication (See MAR)  -EM     Cognitive Assessment/Intervention    Current Cognitive/Communication Assessment functional  -EM     Orientation Status oriented x 4  -EM     Follows Commands/Answers Questions 100% of the time  -EM     Personal Safety WNL/WFL  -EM     ROM (Range of Motion)    General ROM  no range of motion deficits identified  -EM     MMT (Manual Muscle Testing)    General MMT Assessment no strength deficits identified   generalized post op weakness noted, no focal deficits  -EM     Bed Mobility, Assessment/Treatment    Bed Mob, Supine to Sit, Hyrum not tested  -EM     Bed Mob, Sit to Supine, Hyrum not tested  -EM     Transfer Assessment/Treatment    Transfers, Sit-Stand Hyrum minimum assist (75% patient effort);verbal cues required  -EM     Transfers, Stand-Sit Hyrum minimum assist (75% patient effort)  -EM     Gait Assessment/Treatment    Gait, Hyrum Level minimum assist (75% patient effort);2 person assist required  -EM     Gait, Distance (Feet) 45  -EM     Gait, Gait Deviations jemal decreased;forward flexed posture;step length decreased  -EM     Motor Skills/Interventions    Additional Documentation Balance Skills Training (Group)  -EM     Balance Skills Training    Sitting-Level of Assistance Distant supervision  -EM     Standing-Level of Assistance Contact guard  -EM     Gait Balance-Level of Assistance Minimum assistance  -EM     Therapy Exercises    Exercise Protocols --   5 reps cardiac protocol, Level 2  -EM     Positioning and Restraints    Pre-Treatment Position sitting in chair/recliner  -EM     Post Treatment Position chair  -EM     In Chair call light within reach;reclined  -EM       User Key  (r) = Recorded By, (t) = Taken By, (c) = Cosigned By    Initials Name Provider Type    TW Henna Wakefield, RN Registered Nurse    ARIEL Hurtado PT Physical Therapist          Physical Therapy Education     Title: PT OT SLP Therapies (Active)     Topic: Physical Therapy (Active)     Point: Mobility training (Active)    Learning Progress Summary    Learner Readiness Method Response Comment Documented by Status   Patient Acceptance E NR  EM 04/12/17 0857 Active               Point: Home exercise program (Active)    Learning Progress Summary    Learner  Readiness Method Response Comment Documented by Status   Patient Acceptance E NR  EM 04/12/17 0857 Active                      User Key     Initials Effective Dates Name Provider Type Discipline    EM 12/01/15 -  Susan Hurtado, PT Physical Therapist PT                PT Recommendation and Plan  Anticipated Discharge Disposition: home with assist  Planned Therapy Interventions: bed mobility training, gait training, home exercise program  PT Frequency: daily  Plan of Care Review  Outcome Summary/Follow up Plan: patient presents with generalized post op weakness and pain, decreased activity tolerance, decreased balance which limit independence with mobility and patient would benefit from skilled PT to address his impairments. Anticipate patient will be able to return home with assist.           IP PT Goals       04/12/17 0858          Cardiopulmonary PT LTG    Cardiopulmonary PT LTG, Date Established 04/12/17  -EM      Cardiopulmonary PT LTG, Time to Achieve 1 wk  -EM      Cardiopulmonary PT LTG, Level Level V  -EM        User Key  (r) = Recorded By, (t) = Taken By, (c) = Cosigned By    Initials Name Provider Type    EM Susan Hurtado, PT Physical Therapist                Outcome Measures       04/12/17 0800          How much help from another person do you currently need...    Turning from your back to your side while in flat bed without using bedrails? 3  -EM      Moving from lying on back to sitting on the side of a flat bed without bedrails? 2  -EM      Moving to and from a bed to a chair (including a wheelchair)? 3  -EM      Standing up from a chair using your arms (e.g., wheelchair, bedside chair)? 3  -EM      Climbing 3-5 steps with a railing? 2  -EM      To walk in hospital room? 3  -EM      AM-PAC 6 Clicks Score 16  -EM      Functional Assessment    Outcome Measure Options AM-PAC 6 Clicks Basic Mobility (PT)  -EM        User Key  (r) = Recorded By, (t) = Taken By, (c) = Cosigned By    Initials Name  Provider Type    EM Susan Hurtado PT Physical Therapist           Time Calculation:         PT Charges       04/12/17 0902          Time Calculation    Start Time 0820  -EM      Stop Time 0837  -EM      Time Calculation (min) 17 min  -EM      PT Received On 04/12/17  -EM      PT - Next Appointment 04/13/17  -EM      PT Goal Re-Cert Due Date 04/19/17  -EM        User Key  (r) = Recorded By, (t) = Taken By, (c) = Cosigned By    Initials Name Provider Type    EM Susan Hurtado PT Physical Therapist          Therapy Charges for Today     Code Description Service Date Service Provider Modifiers Qty    09652289893 HC PT EVAL MOD COMPLEXITY 2 4/12/2017 Susan Hurtado, PT GP 1    32258284814 HC PT THER PROC EA 15 MIN 4/12/2017 Susan Hurtado, PT GP 1    44101945736 HC PT THER SUPP EA 15 MIN 4/12/2017 Susan Hurtado, PT GP 1          PT G-Codes  Outcome Measure Options: AM-PAC 6 Clicks Basic Mobility (PT)      Susan Hurtado, PT  4/12/2017

## 2017-04-12 NOTE — PLAN OF CARE
Problem: Patient Care Overview (Adult)  Goal: Plan of Care Review  Outcome: Ongoing (interventions implemented as appropriate)    04/12/17 9801   Coping/Psychosocial Response Interventions   Plan Of Care Reviewed With patient;spouse   Patient Care Overview   Progress improving   Outcome Evaluation   Outcome Summary/Follow up Plan Patient transferred to CVU today. Chest tubes removed. Pacing wires isolated and taped. Pain has been difficult o manage, but orders for higher strength Lortab and morphine received and patient seems to be resting more comfortably now.       Goal: Adult Individualization and Mutuality  Outcome: Ongoing (interventions implemented as appropriate)  Goal: Discharge Needs Assessment  Outcome: Ongoing (interventions implemented as appropriate)    Problem: Cardiac Surgery (Adult)  Goal: Signs and Symptoms of Listed Potential Problems Will be Absent or Manageable (Cardiac Surgery)  Outcome: Ongoing (interventions implemented as appropriate)    Problem: Fall Risk (Adult)  Goal: Absence of Falls  Outcome: Ongoing (interventions implemented as appropriate)    Problem: Skin Integrity Impairment, Risk/Actual (Adult)  Goal: Skin Integrity/Wound Healing  Outcome: Ongoing (interventions implemented as appropriate)

## 2017-04-12 NOTE — PROGRESS NOTES
Hospital Follow Up        Chief Complaint: Follow up CAD status post CABG x 3, paroxsymal atrial flutter    Interval History: Sitting up in chair.  About to ambulate.  Remained in sinus rhythm overnight.     Objective:     Objective:  Heart Rate:  [57-80] 57  Resp:  [12-20] 14  BP: ()/(57-72) 112/59  Arterial Line BP: ()/(48-64) 136/64  FiO2 (%):  [40 %-100 %] 40 %     Intake/Output Summary (Last 24 hours) at 04/12/17 0803  Last data filed at 04/12/17 0721   Gross per 24 hour   Intake          4505.77 ml   Output             5069 ml   Net          -563.23 ml     Body mass index is 27.83 kg/(m^2).  Last 3 weights    04/11/17  1309 04/11/17  1652 04/11/17  1934   Weight: 183 lb (83 kg) 183 lb (83 kg) 183 lb (83 kg)     Weight change: -9.6 oz (-0.272 kg)      Physical Exam:   General : Alert, cooperative, in no acute distress.  Neuro: alert,cooperative and oriented  Lungs: CTAB. Normal respiratory effort and rate.  CV:: Regular rate and rhythm, normal S1 and S2, no murmurs, gallops or rubs.  ABD: Soft, nontender, non-distended. positive bowel sounds  Extr: No edema or cyanosis, moves all extremities    Lab Review:     Results from last 7 days  Lab Units 04/12/17  0313 04/11/17  1931 04/11/17  1441  04/10/17  1520  04/07/17  1012   SODIUM mmol/L 141  --  142  < > 141  < > 141   POTASSIUM mmol/L 4.4 4.4 4.3  < > 4.2  < > 4.5   CHLORIDE mmol/L 106  --  108*  < > 104  < > 101   TOTAL CO2 mmol/L 21.4*  --  18.3*  < > 23.9  < > 25.6   BUN mg/dL 11  --  13  < > 12  < > 16   CREATININE mg/dL 0.72*  --  1.05  < > 0.89  < > 0.99   GLUCOSE mg/dL 121*  --  183*  < > 82  < > 109*   CALCIUM mg/dL 8.4*  --  8.8  < > 9.2  < > 10.0   AST (SGOT) U/L  --   --   --   --  38  --  28   ALT (SGPT) U/L  --   --   --   --  31  --  26   < > = values in this interval not displayed.    Results from last 7 days  Lab Units 04/08/17  0500 04/07/17  1820 04/07/17  1012   TROPONIN T ng/mL <0.010 <0.010 <0.010       Results from last 7  days  Lab Units 04/12/17  0313 04/11/17  1441   WBC 10*3/mm3 14.09* 11.60*   HEMOGLOBIN g/dL 12.3* 13.8   HEMATOCRIT % 37.4* 41.6   PLATELETS 10*3/mm3 306 258       Results from last 7 days  Lab Units 04/12/17  0313 04/11/17  1059   INR  1.32* 1.54*   APTT seconds  --  33.3       Results from last 7 days  Lab Units 04/12/17  0313 04/11/17  1931   MAGNESIUM mg/dL 2.5* 2.3       Results from last 7 days  Lab Units 04/10/17  1520   CHOLESTEROL mg/dL 138   TRIGLYCERIDES mg/dL 68   HDL CHOL mg/dL 43   LDL CHOL mg/dL 81           Results from last 7 days  Lab Units 04/10/17  1520 04/08/17  0500   TSH mIU/mL 5.540* 5.100*     I reviewed the patient's new clinical results.  I personally viewed and interpreted the patient's EKG  Current Medications:   Scheduled Meds:  aspirin 81 mg Oral Daily   atorvastatin 40 mg Oral Nightly   chlorhexidine 15 mL Mouth/Throat Q12H   enoxaparin 40 mg Subcutaneous Q24H   magnesium sulfate 1 g Intravenous Q8H   metoclopramide 10 mg Intravenous Q6H   metoprolol tartrate 12.5 mg Oral Q12H   mupirocin 1 application Each Nare Daily   mupirocin  Each Nare BID   pantoprazole 40 mg Oral Q AM   sennosides-docusate sodium 2 tablet Oral Nightly   vancomycin 15 mg/kg Intravenous Q12H     Continuous Infusions:  amiodarone 1 mg/min Last Rate: 0.5 mg/min (04/11/17 7022)   clevidipine 2-32 mg/hr    DOPamine 2-20 mcg/kg/min    EPINEPHrine 0.02-0.3 mcg/kg/min    insulin regular infusion 1 unit/mL (CCU use) 0-50 Units/hr Last Rate: Stopped (04/12/17 5727)   milrinone 0.25-0.75 mcg/kg/min    niCARdipine 5-15 mg/hr Last Rate: Stopped (04/11/17 1326)   nitroglycerin 5-200 mcg/min    norepinephrine 0.02-0.3 mcg/kg/min    phenylephrine 0.5-3 mcg/kg/min    sodium chloride 30 mL/hr Last Rate: 15 mL/hr (04/11/17 5460)   sodium chloride 30 mL/hr Last Rate: Stopped (04/12/17 7400)   vasopressin 0.02-0.1 Units/min        Allergies:  Allergies   Allergen Reactions   • Penicillins        Assessment/Plan:     1. Multivessel  CAD status post CABG x 3: POD #1  2. Paroxsymal typical atrial flutter: Present on admission and converted with amiodarone.  Watch for onset of atrial fibrillation/flutter in post-op period.  3. Hypertension  4. Hyperlipidemia  5. PVD status post prior CEA    -  Will transition from IV to PO amiodarone this morning.  Uptitrate beta blockers as BP tolerates.  Start anticoagulation when appropriate.       Marjorie Healy MD  04/12/17  8:03 AM

## 2017-04-12 NOTE — PROGRESS NOTES
Acute Care - Speech Language Pathology   Swallow Initial Evaluation [facility]     Patient Name: Madhu Santoro  : 1945  MRN: [id number]  Today's Date: 2017  Onset of Illness/Injury or Date of Surgery Date: 17            Admit Date: 2017    SPEECH-LANGUAGE PATHOLOGY EVALUATION - SWALLOW  Subjective: The patient was seen on this date for a Clinical Swallow evaluation.  Patient was alert and cooperative.  Complaining of pain now that he is in the chair --complains of 10/10 pain, RN made aware.   The patient underwent CABG x3 17 w/ intubation.  Post op complaining of pills sticking in pharynx & wanted his pills crushed.   Objective: Textures given included ice chips, thin liquid, puree consistency and regular consistency.  Assessment: No overt s/s of aspiration w/ thin by cup or straw.  With trials of puree pt was noted to swallow 3-4x per bolus.  Coughing noted w/ thin liquids when used as liquid wash w/ cracker.  Pt does not have the desire to eat due to significant pain.   SLP Findings:  Patient presents with mild oropharyngeal dysphagia, without esophageal component.   Recommendations: Diet Textures: thin liquid, puree consistency food when ok to advance; currently on clear liquids.  Medications should be taken crushed with puree.   Recommended Strategies: Upright for PO and small bites and sips. Oral care before breakfast, after all meals and PRN.  Other Recommended Evaluations: Re-evaluation at bedside        Visit Dx:     ICD-10-CM ICD-9-CM   1. New onset atrial flutter I48.92 427.32   2. Coronary artery disease involving native coronary artery of native heart without angina pectoris I25.10 414.01   3. Generalized weakness R53.1 780.79     Patient Active Problem List   Diagnosis   • Anxiety   • Arthritis   • CAD (coronary artery disease)   • HLD (hyperlipidemia)   • Benign essential hypertension   • DDD (degenerative disc disease), lumbosacral   • Stroke   • Screening for  prostate cancer   • Hypertension   • Gastroesophageal reflux disease   • Hyperlipidemia   • New onset atrial flutter     Past Medical History:   Diagnosis Date   • Anxiety    • Arthritis    • Benign essential hypertension    • CAD (coronary artery disease)    • Chest pain    • Colonic polyp    • DDD (degenerative disc disease), lumbosacral    • H/O bone density study 2013   • H/O complete eye exam 2014   • HLD (hyperlipidemia)    • Hypertension    • Lipid screening 05/31/2013   • Low back pain     physical therapy [facility] rehab 5-12-10   • Screening for prostate cancer 07/07/2015   • Stroke      Past Surgical History:   Procedure Laterality Date   • CARDIAC CATHETERIZATION N/A 4/10/2017    Procedure: Left Heart Cath;  Surgeon: Marjorie Healy MD;  Location: [facility] CATH INVASIVE LOCATION;  Service:    • CARDIAC CATHETERIZATION N/A 4/10/2017    Procedure: Coronary angiography;  Surgeon: Marjorie Healy MD;  Location: [facility] CATH INVASIVE LOCATION;  Service:    • CARDIAC CATHETERIZATION N/A 4/10/2017    Procedure: Left ventriculography;  Surgeon: Marjorie Healy MD;  Location: [facility] CATH INVASIVE LOCATION;  Service:    • COLONOSCOPY  2010   • CORONARY ARTERY BYPASS GRAFT N/A 4/11/2017    Procedure: AR STERNOTOMY CORONARY ARTERY BYPASS GRAFT TIMES 3 USING LEFT INTERNAL MAMMARY ARTERY AND LEFT GREATER SAPHENOUS VEIN GRAFT PER ENDOSCOPIC VEIN HARVESTING AND PRP ;  Surgeon: Temo Cortez MD;  Location: [facility] OR;  Service:           SWALLOW EVALUATION (last 72 hours)      Swallow Evaluation       04/12/17 1442                Rehab Evaluation    Document Type evaluation  -NB        Symptoms Noted During/After Treatment none  -NB        General Information    Patient Profile Review yes  -NB        Current Diet Limitations thin liquids;other (see comments)   clear liquids  -NB        Precautions/Limitations, Vision WFL  -NB        Precautions/Limitations, Hearing WFL  -NB        Prior Level of Function-  Communication functional in all spheres  -NB        Prior Level of Function- Swallowing no diet consistency restrictions  -NB        Plans/Goals Discussed With patient  -NB        Barriers to Rehab none identified  -NB        Clinical Impression    Patient's Goals For Discharge patient did not state  -NB        SLP Swallowing Diagnosis mild dysphagia;oral dysfunction;pharyngeal dysfunction  -NB        Rehab Potential/Prognosis, Swallowing good, to achieve stated therapy goals  -NB        Criteria for Skilled Therapeutic Interventions Met skilled criteria for dysphagia intervention met  -NB        Therapy Frequency PRN  -NB        Predicted Duration Therapy Interv (days) until discharge  -NB        Expected Duration Therapy Session (min) 15-30 minutes  -NB        SLP Diet Recommendation thin liquids;II - pureed  -NB        Recommended Diagnostics reassess via clinical swallow (non-instrumental exam)  -NB        Recommended Feeding/Eating Techniques small sips/bites  -NB        SLP Rec. for Method of Medication Administration meds crushed in pudding/applesauce  -NB        Monitor For Signs Of Aspiration cough;gurgly voice;throat clearing  -NB        Anticipated Discharge Disposition other (see comments)   unknown  -NB        Pain Assessment    Pain Assessment --  -NB        Pain Score 10  -NB        Pain Location Back   chest tube location  -NB        Pain Intervention(s) --   RN aware, max pain meds at this time  -NB        Cognitive Assessment/Intervention    Current Cognitive/Communication Assessment functional  -NB        Orientation Status oriented x 4  -NB        Follows Commands/Answers Questions 100% of the time  -NB        Oral Motor Structure and Function    Oral Motor Anatomy and Physiology patient demonstrates anatomy that is WNL  -NB        Dentition Assessment present and adequate  -NB        Secretion Management WNL/WFL  -NB        Mucosal Quality moist, healthy  -NB        Velar Elevation WNL (within  normal limits)  -NB        Volitional Swallow no difficulties initiating volitional swallow  -NB        Volitional Cough no difficulties initiating volitional cough  -NB        Oral Musculature General Assessment WNL (within normal limits)  -NB          User Key  (r) = Recorded By, (t) = Taken By, (c) = Cosigned By    Initials Name Effective Dates    ROBERT Handy MS CCC-SLP 04/13/15 -         EDUCATION  The patient has been educated in the following areas:   Dysphagia (Swallowing Impairment).    SLP Recommendation and Plan  SLP Swallowing Diagnosis: mild dysphagia, oral dysfunction, pharyngeal dysfunction  SLP Diet Recommendation: thin liquids, II - pureed  Recommended Feeding/Eating Techniques: small sips/bites  SLP Rec. for Method of Medication Administration: meds crushed in pudding/applesauce  Monitor For Signs Of Aspiration: cough, gurgly voice, throat clearing  Recommended Diagnostics: reassess via clinical swallow (non-instrumental exam)  Criteria for Skilled Therapeutic Interventions Met: skilled criteria for dysphagia intervention met  Anticipated Discharge Disposition: other (see comments) (unknown)  Rehab Potential/Prognosis, Swallowing: good, to achieve stated therapy goals  Therapy Frequency: PRN                         SLP Outcome Measures (last 72 hours)      SLP Outcome Measures       04/12/17 1441          SLP Outcome Measures    Outcome Measure Used? Adult NOMS  -NB      FCM Scores    FCM Chosen Swallowing  -NB      Swallowing FCM Score 4  -NB        User Key  (r) = Recorded By, (t) = Taken By, (c) = Cosigned By    Initials Name Effective Dates    ROBERT Handy MS CCC-SLP 04/13/15 -            Time Calculation:         Time Calculation- SLP       04/12/17 1445          Time Calculation- SLP    SLP Start Time 1300  -NB      SLP Stop Time 1400  -NB      SLP Time Calculation (min) 60 min  -NB      SLP Received On 04/12/17  -NB        User Key  (r) = Recorded By, (t) = Taken By, (c) = Cosigned  By    Initials Name Provider Type    NB Vi Handy MS CCC-SLP Speech and Language Pathologist          Therapy Charges for Today     Code Description Service Date Service Provider Modifiers Qty    [id number] HC ST EVAL ORAL PHARYNG SWALLOW 4 4/12/2017 Vi Handy MS CCC-SLP GN 1               Vi Handy MS CCC-GONZÁLEZ  4/12/2017

## 2017-04-12 NOTE — PLAN OF CARE
Problem: Patient Care Overview (Adult)  Goal: Plan of Care Review    04/12/17 0858   Outcome Evaluation   Outcome Summary/Follow up Plan patient presents with generalized post op weakness and pain, decreased activity tolerance, decreased balance which limit independence with mobility and patient would benefit from skilled PT to address his impairments. Anticipate patient will be able to return home with assist.          Problem: Inpatient Physical Therapy  Goal: Cardiopulmonary Goal LTG- PT  Outcome: Ongoing (interventions implemented as appropriate)    04/12/17 0858   Cardiopulmonary PT LTG   Cardiopulmonary PT LTG, Date Established 04/12/17   Cardiopulmonary PT LTG, Time to Achieve 1 wk   Cardiopulmonary PT LTG, Level Level V

## 2017-04-13 ENCOUNTER — APPOINTMENT (OUTPATIENT)
Dept: GENERAL RADIOLOGY | Facility: HOSPITAL | Age: 72
End: 2017-04-13

## 2017-04-13 LAB
ANION GAP SERPL CALCULATED.3IONS-SCNC: 12.3 MMOL/L
BUN BLD-MCNC: 11 MG/DL (ref 8–23)
BUN/CREAT SERPL: 13.3 (ref 7–25)
CALCIUM SPEC-SCNC: 8.7 MG/DL (ref 8.6–10.5)
CHLORIDE SERPL-SCNC: 101 MMOL/L (ref 98–107)
CO2 SERPL-SCNC: 25.7 MMOL/L (ref 22–29)
CREAT BLD-MCNC: 0.83 MG/DL (ref 0.76–1.27)
DEPRECATED RDW RBC AUTO: 50.5 FL (ref 37–54)
ERYTHROCYTE [DISTWIDTH] IN BLOOD BY AUTOMATED COUNT: 14 % (ref 11.5–14.5)
GFR SERPL CREATININE-BSD FRML MDRD: 91 ML/MIN/1.73
GLUCOSE BLD-MCNC: 118 MG/DL (ref 65–99)
GLUCOSE BLDC GLUCOMTR-MCNC: 101 MG/DL (ref 70–130)
GLUCOSE BLDC GLUCOMTR-MCNC: 102 MG/DL (ref 70–130)
GLUCOSE BLDC GLUCOMTR-MCNC: 102 MG/DL (ref 70–130)
GLUCOSE BLDC GLUCOMTR-MCNC: 108 MG/DL (ref 70–130)
HCT VFR BLD AUTO: 42.2 % (ref 40.4–52.2)
HGB BLD-MCNC: 13.5 G/DL (ref 13.7–17.6)
MCH RBC QN AUTO: 31.3 PG (ref 27–32.7)
MCHC RBC AUTO-ENTMCNC: 32 G/DL (ref 32.6–36.4)
MCV RBC AUTO: 97.7 FL (ref 79.8–96.2)
PLATELET # BLD AUTO: 358 10*3/MM3 (ref 140–500)
PMV BLD AUTO: 10.9 FL (ref 6–12)
POTASSIUM BLD-SCNC: 4.6 MMOL/L (ref 3.5–5.2)
RBC # BLD AUTO: 4.32 10*6/MM3 (ref 4.6–6)
SODIUM BLD-SCNC: 139 MMOL/L (ref 136–145)
WBC NRBC COR # BLD: 12.83 10*3/MM3 (ref 4.5–10.7)

## 2017-04-13 PROCEDURE — 25010000002 ENOXAPARIN PER 10 MG: Performed by: THORACIC SURGERY (CARDIOTHORACIC VASCULAR SURGERY)

## 2017-04-13 PROCEDURE — 99233 SBSQ HOSP IP/OBS HIGH 50: CPT | Performed by: INTERNAL MEDICINE

## 2017-04-13 PROCEDURE — 97110 THERAPEUTIC EXERCISES: CPT

## 2017-04-13 PROCEDURE — 25010000002 DIGOXIN PER 500 MCG: Performed by: INTERNAL MEDICINE

## 2017-04-13 PROCEDURE — 80048 BASIC METABOLIC PNL TOTAL CA: CPT | Performed by: THORACIC SURGERY (CARDIOTHORACIC VASCULAR SURGERY)

## 2017-04-13 PROCEDURE — 93005 ELECTROCARDIOGRAM TRACING: CPT | Performed by: THORACIC SURGERY (CARDIOTHORACIC VASCULAR SURGERY)

## 2017-04-13 PROCEDURE — 93010 ELECTROCARDIOGRAM REPORT: CPT | Performed by: INTERNAL MEDICINE

## 2017-04-13 PROCEDURE — 93005 ELECTROCARDIOGRAM TRACING: CPT | Performed by: INTERNAL MEDICINE

## 2017-04-13 PROCEDURE — 25010000002 AMIODARONE IN DEXTROSE 5% 150 MG/100ML SOLUTION: Performed by: INTERNAL MEDICINE

## 2017-04-13 PROCEDURE — 82962 GLUCOSE BLOOD TEST: CPT

## 2017-04-13 PROCEDURE — 85027 COMPLETE CBC AUTOMATED: CPT | Performed by: THORACIC SURGERY (CARDIOTHORACIC VASCULAR SURGERY)

## 2017-04-13 PROCEDURE — 92526 ORAL FUNCTION THERAPY: CPT

## 2017-04-13 PROCEDURE — 71010 HC CHEST PA OR AP: CPT

## 2017-04-13 PROCEDURE — 97165 OT EVAL LOW COMPLEX 30 MIN: CPT

## 2017-04-13 PROCEDURE — 25010000002 AMIODARONE IN DEXTROSE 5% 360 MG/200ML SOLUTION: Performed by: INTERNAL MEDICINE

## 2017-04-13 PROCEDURE — 25010000002 FUROSEMIDE PER 20 MG: Performed by: PHYSICIAN ASSISTANT

## 2017-04-13 RX ORDER — METOPROLOL TARTRATE 50 MG/1
50 TABLET, FILM COATED ORAL EVERY 12 HOURS SCHEDULED
Status: DISCONTINUED | OUTPATIENT
Start: 2017-04-13 | End: 2017-04-16

## 2017-04-13 RX ORDER — DIGOXIN 250 MCG
250 TABLET ORAL
Status: DISCONTINUED | OUTPATIENT
Start: 2017-04-14 | End: 2017-04-18

## 2017-04-13 RX ORDER — DIGOXIN 0.25 MG/ML
500 INJECTION INTRAMUSCULAR; INTRAVENOUS ONCE
Status: COMPLETED | OUTPATIENT
Start: 2017-04-13 | End: 2017-04-13

## 2017-04-13 RX ORDER — FUROSEMIDE 10 MG/ML
40 INJECTION INTRAMUSCULAR; INTRAVENOUS ONCE
Status: COMPLETED | OUTPATIENT
Start: 2017-04-13 | End: 2017-04-13

## 2017-04-13 RX ORDER — DIGOXIN 0.25 MG/ML
500 INJECTION INTRAMUSCULAR; INTRAVENOUS ONCE
Status: DISCONTINUED | OUTPATIENT
Start: 2017-04-13 | End: 2017-04-14

## 2017-04-13 RX ADMIN — METOPROLOL TARTRATE 50 MG: 50 TABLET ORAL at 20:44

## 2017-04-13 RX ADMIN — ENOXAPARIN SODIUM 40 MG: 40 INJECTION SUBCUTANEOUS at 20:40

## 2017-04-13 RX ADMIN — ATORVASTATIN CALCIUM 40 MG: 40 TABLET, FILM COATED ORAL at 20:33

## 2017-04-13 RX ADMIN — HYDROCODONE BITARTRATE AND ACETAMINOPHEN 1 TABLET: 7.5; 325 TABLET ORAL at 07:44

## 2017-04-13 RX ADMIN — METOPROLOL TARTRATE 25 MG: 25 TABLET ORAL at 09:11

## 2017-04-13 RX ADMIN — DOCUSATE SODIUM,SENNOSIDES 2 TABLET: 50; 8.6 TABLET, FILM COATED ORAL at 20:33

## 2017-04-13 RX ADMIN — HYDROCODONE BITARTRATE AND ACETAMINOPHEN 1 TABLET: 7.5; 325 TABLET ORAL at 13:32

## 2017-04-13 RX ADMIN — AMIODARONE HYDROCHLORIDE 1 MG/MIN: 1.8 INJECTION, SOLUTION INTRAVENOUS at 08:04

## 2017-04-13 RX ADMIN — HYDROCODONE BITARTRATE AND ACETAMINOPHEN 2 TABLET: 7.5; 325 TABLET ORAL at 20:33

## 2017-04-13 RX ADMIN — ASPIRIN 81 MG: 81 TABLET ORAL at 09:11

## 2017-04-13 RX ADMIN — DIGOXIN 500 MCG: 0.25 INJECTION INTRAMUSCULAR; INTRAVENOUS at 07:44

## 2017-04-13 RX ADMIN — FUROSEMIDE 40 MG: 10 INJECTION, SOLUTION INTRAMUSCULAR; INTRAVENOUS at 09:23

## 2017-04-13 RX ADMIN — AMIODARONE HYDROCHLORIDE 150 MG: 1.5 INJECTION, SOLUTION INTRAVENOUS at 07:44

## 2017-04-13 RX ADMIN — AMIODARONE HYDROCHLORIDE 0.5 MG/MIN: 1.8 INJECTION, SOLUTION INTRAVENOUS at 13:53

## 2017-04-13 NOTE — PROGRESS NOTES
" LOS: 6 days   Patient Care Team:  Damian Chen MD as PCP - General (Family Medicine)  Damian Chen MD as PCP - Claims Attributed - PLEASE DO NOT REMOVE  Temo Cortez MD as Surgeon (Cardiothoracic Surgery)    Chief Complaint: f/u CABG    Subjective  Up in chair, went into afib this am    Vital Signs  Temp:  [97.7 °F (36.5 °C)-98.8 °F (37.1 °C)] 98.5 °F (36.9 °C)  Heart Rate:  [] 137  Resp:  [16-20] 20  BP: (114-137)/(67-85) 116/75  Body mass index is 28.16 kg/(m^2).    Intake/Output Summary (Last 24 hours) at 04/13/17 0853  Last data filed at 04/13/17 0714   Gross per 24 hour   Intake              120 ml   Output              975 ml   Net             -855 ml     I/O this shift:  In: 120 [P.O.:120]  Out: -       Last 3 weights    04/11/17  1652 04/11/17  1934 04/13/17  0705   Weight: 183 lb (83 kg) 183 lb (83 kg) 185 lb 3.2 oz (84 kg)         Objective:  General Appearance:  In no acute distress.    Vital signs: (most recent): Blood pressure 116/75, pulse (!) 137, temperature 98.5 °F (36.9 °C), temperature source Oral, resp. rate 20, height 68\" (172.7 cm), weight 185 lb 3.2 oz (84 kg), SpO2 92 %.    Output: Producing urine.    Lungs:  Normal respiratory rate and normal effort.  There are decreased breath sounds.    Heart: Tachycardia.  Irregular rhythm.  (Tele: afib/flutter -130)  Abdomen: Abdomen is soft and non-distended.  There is no abdominal tenderness.     Neurological: Patient is alert and oriented to person, place and time.    Skin:  Warm and dry.  (Incisions intact)          Results Review:    WBC WBC   Date Value Ref Range Status   04/13/2017 12.83 (H) 4.50 - 10.70 10*3/mm3 Final   04/12/2017 14.09 (H) 4.50 - 10.70 10*3/mm3 Final   04/11/2017 11.60 (H) 4.50 - 10.70 10*3/mm3 Final   04/11/2017 11.71 (H) 4.50 - 10.70 10*3/mm3 Final   04/11/2017 6.92 4.50 - 10.70 10*3/mm3 Final   04/10/2017 7.45 4.50 - 10.70 10*3/mm3 Final      HGB Hemoglobin   Date Value Ref Range Status   04/13/2017 " 13.5 (L) 13.7 - 17.6 g/dL Final   04/12/2017 12.3 (L) 13.7 - 17.6 g/dL Final   04/11/2017 13.8 13.7 - 17.6 g/dL Final   04/11/2017 11.6 (L) 13.7 - 17.6 g/dL Final   04/11/2017 10.9 (L) 12.0 - 17.0 g/dL Final   04/11/2017 11.2 (L) 12.0 - 17.0 g/dL Final   04/11/2017 11.2 (L) 12.0 - 17.0 g/dL Final   04/11/2017 10.5 (L) 12.0 - 17.0 g/dL Final   04/11/2017 10.5 (L) 12.0 - 17.0 g/dL Final   04/11/2017 14.6 12.0 - 17.0 g/dL Final   04/11/2017 14.3 13.7 - 17.6 g/dL Final   04/10/2017 15.1 13.7 - 17.6 g/dL Final      HCT Hematocrit   Date Value Ref Range Status   04/13/2017 42.2 40.4 - 52.2 % Final   04/12/2017 37.4 (L) 40.4 - 52.2 % Final   04/11/2017 41.6 40.4 - 52.2 % Final   04/11/2017 34.5 (L) 40.4 - 52.2 % Final   04/11/2017 32 (L) 38 - 51 % Final   04/11/2017 33 (L) 38 - 51 % Final   04/11/2017 33 (L) 38 - 51 % Final   04/11/2017 31 (L) 38 - 51 % Final   04/11/2017 31 (L) 38 - 51 % Final   04/11/2017 43 38 - 51 % Final   04/11/2017 45.0 40.4 - 52.2 % Final   04/10/2017 45.8 40.4 - 52.2 % Final      Platelets Platelets   Date Value Ref Range Status   04/13/2017 358 140 - 500 10*3/mm3 Final   04/12/2017 306 140 - 500 10*3/mm3 Final   04/11/2017 258 140 - 500 10*3/mm3 Final   04/11/2017 305 140 - 500 10*3/mm3 Final   04/11/2017 365 140 - 500 10*3/mm3 Final   04/10/2017 382 140 - 500 10*3/mm3 Final        PT/INR:    Protime   Date Value Ref Range Status   04/12/2017 15.9 (H) 11.7 - 14.2 Seconds Final   04/11/2017 17.9 (H) 11.7 - 14.2 Seconds Final   /  INR   Date Value Ref Range Status   04/12/2017 1.32 (H) 0.90 - 1.10 Final   04/11/2017 1.54 (H) 0.90 - 1.10 Final       Sodium Sodium   Date Value Ref Range Status   04/13/2017 139 136 - 145 mmol/L Final   04/12/2017 141 136 - 145 mmol/L Final   04/11/2017 142 136 - 145 mmol/L Final   04/11/2017 142 136 - 145 mmol/L Final   04/11/2017 141 136 - 145 mmol/L Final   04/10/2017 141 136 - 145 mmol/L Final      Potassium Potassium   Date Value Ref Range Status   04/13/2017 4.6  3.5 - 5.2 mmol/L Final   04/12/2017 4.4 3.5 - 5.2 mmol/L Final   04/11/2017 4.4 3.5 - 5.2 mmol/L Final   04/11/2017 4.3 3.5 - 5.2 mmol/L Final   04/11/2017 5.0 3.5 - 5.2 mmol/L Final   04/11/2017 4.3 3.5 - 5.2 mmol/L Final   04/10/2017 4.2 3.5 - 5.2 mmol/L Final      Chloride Chloride   Date Value Ref Range Status   04/13/2017 101 98 - 107 mmol/L Final   04/12/2017 106 98 - 107 mmol/L Final   04/11/2017 108 (H) 98 - 107 mmol/L Final   04/11/2017 106 98 - 107 mmol/L Final   04/11/2017 105 98 - 107 mmol/L Final   04/10/2017 104 98 - 107 mmol/L Final      Bicarbonate CO2   Date Value Ref Range Status   04/13/2017 25.7 22.0 - 29.0 mmol/L Final   04/12/2017 21.4 (L) 22.0 - 29.0 mmol/L Final   04/11/2017 18.3 (L) 22.0 - 29.0 mmol/L Final   04/11/2017 21.5 (L) 22.0 - 29.0 mmol/L Final   04/11/2017 22.2 22.0 - 29.0 mmol/L Final   04/10/2017 23.9 22.0 - 29.0 mmol/L Final      BUN BUN   Date Value Ref Range Status   04/13/2017 11 8 - 23 mg/dL Final   04/12/2017 11 8 - 23 mg/dL Final   04/11/2017 13 8 - 23 mg/dL Final   04/11/2017 12 8 - 23 mg/dL Final   04/11/2017 12 8 - 23 mg/dL Final   04/10/2017 12 8 - 23 mg/dL Final      Creatinine Creatinine   Date Value Ref Range Status   04/13/2017 0.83 0.76 - 1.27 mg/dL Final   04/12/2017 0.72 (L) 0.76 - 1.27 mg/dL Final   04/11/2017 1.05 0.76 - 1.27 mg/dL Final   04/11/2017 0.91 0.76 - 1.27 mg/dL Final   04/11/2017 0.92 0.76 - 1.27 mg/dL Final   04/10/2017 0.89 0.76 - 1.27 mg/dL Final      Calcium Calcium   Date Value Ref Range Status   04/13/2017 8.7 8.6 - 10.5 mg/dL Final   04/12/2017 8.4 (L) 8.6 - 10.5 mg/dL Final   04/11/2017 8.8 8.6 - 10.5 mg/dL Final   04/11/2017 9.4 8.6 - 10.5 mg/dL Final   04/11/2017 8.9 8.6 - 10.5 mg/dL Final   04/10/2017 9.2 8.6 - 10.5 mg/dL Final      Magnesium Magnesium   Date Value Ref Range Status   04/12/2017 2.5 (H) 1.6 - 2.4 mg/dL Final   04/11/2017 2.3 1.6 - 2.4 mg/dL Final   04/11/2017 2.8 (H) 1.6 - 2.4 mg/dL Final   04/11/2017 3.2 (H) 1.6 - 2.4  mg/dL Final   04/10/2017 2.2 1.6 - 2.4 mg/dL Final            aspirin 81 mg Oral Daily   atorvastatin 40 mg Oral Nightly   enoxaparin 40 mg Subcutaneous Nightly   insulin aspart 0-9 Units Subcutaneous 4x Daily With Meals & Nightly   metoprolol tartrate 25 mg Oral Q12H   mupirocin  Each Nare Daily   pantoprazole 40 mg Oral Q AM   sennosides-docusate sodium 2 tablet Oral Nightly       amiodarone 1 mg/min Last Rate: 1 mg/min (04/13/17 0804)   Followed by     amiodarone 0.5 mg/min        Patient Active Problem List   Diagnosis Code   • Anxiety F41.9   • Arthritis M19.90   • CAD (coronary artery disease) I25.10   • HLD (hyperlipidemia) E78.5   • Benign essential hypertension I10   • DDD (degenerative disc disease), lumbosacral M51.37   • Stroke I63.9   • Screening for prostate cancer Z12.5   • Hypertension I10   • Gastroesophageal reflux disease K21.9   • Hyperlipidemia E78.5   • New onset atrial flutter I48.92     CXR 4/13/17:  IMPRESSION:  Low lung volumes compared to previous examination, increasing congestion  and a small left effusion. Follow-up to resolution is recommended.                Assessment & Plan  POD 2 CABG  Went into afib/flutter this am with RVR,  -130. amiio gtt started per cardiology  CXR with congestion and small left effusion    Plan:  Encourage IS and ambulate with PT  Continue amio gtt and increase metoprolol to 25 BID per cardiology  A/c per cards  Keep tpw's   WINSTON Lindsey  04/13/17  8:53 AM

## 2017-04-13 NOTE — PROGRESS NOTES
Acute Care - Physical Therapy Treatment Note  [facility]     Patient Name: Madhu Santoro  : 1945  MRN: [id number]  Today's Date: 2017  Onset of Illness/Injury or Date of Surgery Date: 17          Admit Date: 2017    Visit Dx:    ICD-10-CM ICD-9-CM   1. New onset atrial flutter I48.92 427.32   2. Coronary artery disease involving native coronary artery of native heart without angina pectoris I25.10 414.01   3. Generalized weakness R53.1 780.79     Patient Active Problem List   Diagnosis   • Anxiety   • Arthritis   • CAD (coronary artery disease)   • HLD (hyperlipidemia)   • Benign essential hypertension   • DDD (degenerative disc disease), lumbosacral   • Stroke   • Screening for prostate cancer   • Hypertension   • Gastroesophageal reflux disease   • Hyperlipidemia   • New onset atrial flutter               Adult Rehabilitation Note       17 1008          Rehab Assessment/Intervention    Discipline (P)  physical therapist  -CH      Document Type (P)  therapy note (daily note)  -CH      Subjective Information (P)  agree to therapy;complains of;pain  -CH      Patient Effort, Rehab Treatment (P)  adequate  -CH      Symptoms Noted During/After Treatment (P)  shortness of breath  -CH      Precautions/Limitations (P)  cardiac precautions;fall precautions;oxygen therapy device and L/min;sternal precautions   2L/min  -CH      Recorded by [CH] Miguel Stephenson, PT Student      Vital Signs    Pre Systolic BP Rehab (P)  129  -CH      Pre Treatment Diastolic BP (P)  77  -CH      Pretreatment Heart Rate (beats/min) (P)  113  -CH      Posttreatment Heart Rate (beats/min) (P)  113  -CH      Pre SpO2 (%) (P)  93  -CH      O2 Delivery Pre Treatment (P)  supplemental O2  -CH      Post SpO2 (%) (P)  94  -CH      O2 Delivery Post Treatment (P)  supplemental O2  -CH      Recorded by [CH] Miguel Stephenson, PT Student      Pain Assessment    Pain Assessment (P)  0-10  -CH      Pain Score (P)  5  -CH       Pain Location (P)  Back  -      Pain Intervention(s) (P)  Repositioned  -      Recorded by [] Miguel Stephenson PT Student      Cognitive Assessment/Intervention    Current Cognitive/Communication Assessment (P)  functional  -      Orientation Status (P)  oriented x 4  -      Follows Commands/Answers Questions (P)  100% of the time;able to follow single-step instructions  -      Personal Safety (P)  WNL/WFL  -      Personal Safety Interventions (P)  fall prevention program maintained;nonskid shoes/slippers when out of bed  -      Recorded by [] Miguel Stephenson, PT Student      Bed Mobility, Assessment/Treatment    Bed Mob, Supine to Sit, [place] (P)  not tested  -      Bed Mob, Sit to Supine, [place] (P)  not tested  -      Bed Mobility, Comment (P)  up in chair  -      Recorded by [] Miguel Stephenson PT Student      Transfer Assessment/Treatment    Transfers, Sit-Stand [place] (P)  moderate assist (50% patient effort)  -      Transfers, Stand-Sit [place] (P)  minimum assist (75% patient effort);2 person assist required  -      Transfer, Impairments (P)  strength decreased  -      Recorded by [] Miguel Stephenson PT Student      Gait Assessment/Treatment    Gait, [place] Level (P)  minimum assist (75% patient effort);2 person assist required  -      Gait, Distance (Feet) (P)  70  -      Gait, Gait Deviations (P)  step length decreased;jemal decreased  -      Gait, Safety Issues (P)  supplemental O2;step length decreased  -      Gait, Impairments (P)  strength decreased  -      Recorded by [] SHANELL Puentes      Therapy Exercises    Exercise Protocols (P)  --   level III cardiac protocol 5 reps each  -      Recorded by [] Miguel Stephenson PT Student      Positioning and Restraints    Pre-Treatment Position (P)  sitting in chair/recliner  -      In Chair (P)  reclined;call light within reach;encouraged to call for assist;with  family/caregiver  -      Recorded by [CH] Miguel Stephenson, PT Student        User Key  (r) = Recorded By, (t) = Taken By, (c) = Cosigned By    Initials Name Effective Dates    CH Miguel Stephenson, PT Student 01/31/17 -                 IP PT Goals       04/12/17 0858          Cardiopulmonary PT LTG    Cardiopulmonary PT LTG, Date Established 04/12/17  -EM      Cardiopulmonary PT LTG, Time to Achieve 1 wk  -EM      Cardiopulmonary PT LTG, Level Level V  -EM        User Key  (r) = Recorded By, (t) = Taken By, (c) = Cosigned By    Initials Name Provider Type    EM Susan Hurtado, PT Physical Therapist          Physical Therapy Education     Title: PT OT SLP Therapies (Active)     Topic: Physical Therapy (Active)     Point: Mobility training (Active)    Learning Progress Summary    Learner Readiness Method Response Comment Documented by Status   Patient Acceptance E NR   04/13/17 1013 Active    Acceptance E NR   04/12/17 0857 Active   Family Acceptance E [facility] 04/13/17 1013 Active               Point: Home exercise program (Active)    Learning Progress Summary    Learner Readiness Method Response Comment Documented by Status   Patient Acceptance E NR   04/12/17 0857 Active                      User Key     Initials Effective Dates Name Provider Type Discipline     12/01/15 -  Susan Hurtado PT Physical Therapist PT     01/31/17 -  Miguel Stephenson PT Student PT Student PT                    PT Recommendation and Plan  Anticipated Discharge Disposition: home with assist  Planned Therapy Interventions: bed mobility training, gait training, home exercise program  PT Frequency: daily  Plan of Care Review  Plan Of Care Reviewed With: (P) patient  Progress: (P) improving  Outcome Summary/Follow up Plan: (P) Pt presented to PT with decreased pain from yesterday after having some lines/tubes removed. He performed all seated exercises with demonstration and all functional mobility with min A x2. Pt ambulated  "in silva for 70' before requiring seated rest because he was \"running out of air\". Pt's O2 sats remained >92% while with PT. Pt will continue to benefit from skilled PT in order to increase functional mobility independence so he can return home with wife.          Outcome Measures       04/13/17 1000 04/12/17 0800       How much help from another person do you currently need...    Turning from your back to your side while in flat bed without using bedrails? (P)  3  -CH 3  -EM     Moving from lying on back to sitting on the side of a flat bed without bedrails? (P)  2  -CH 2  -EM     Moving to and from a bed to a chair (including a wheelchair)? (P)  3  -CH 3  -EM     Standing up from a chair using your arms (e.g., wheelchair, bedside chair)? (P)  3  -CH 3  -EM     Climbing 3-5 steps with a railing? (P)  2  -CH 2  -EM     To walk in hospital room? (P)  3  -CH 3  -EM     AM-PAC 6 Clicks Score (P)  16  -CH 16  -EM     Functional Assessment    Outcome Measure Options (P)  AM-PAC 6 Clicks Basic Mobility (PT)  -CH AM-PAC 6 Clicks Basic Mobility (PT)  -EM       User Key  (r) = Recorded By, (t) = Taken By, (c) = Cosigned By    Initials Name Provider Type    EM Susan Hurtado, PT Physical Therapist     Miguel Stephenson, PT Student PT Student           Time Calculation:         PT Charges       04/13/17 1018          Time Calculation    Start Time (P)  0951  -      Stop Time (P)  1005  -      Time Calculation (min) (P)  14 min  -      PT Received On (P)  04/13/17  -      PT - Next Appointment (P)  04/14/17  -      PT Goal Re-Cert Due Date (P)  04/19/17  -        User Key  (r) = Recorded By, (t) = Taken By, (c) = Cosigned By    Initials Name Provider Type    NORY Stephenson, PT Student PT Student          Therapy Charges for Today     Code Description Service Date Service Provider Modifiers Qty    [id number]  PT THER PROC EA 15 MIN 4/13/2017 Miguel Stephenson PT Student GP 1    [id number]  PT THER SUPP EA " 15 MIN 4/13/2017 Miguel Stephenson, PT Student GP 1          PT G-Codes  Outcome Measure Options: (P) AM-PAC 6 Clicks Basic Mobility (PT)    Miguel Stephenson, PT Student  4/13/2017

## 2017-04-13 NOTE — PLAN OF CARE
Problem: Patient Care Overview (Adult)  Goal: Plan of Care Review  Outcome: Ongoing (interventions implemented as appropriate)    04/13/17 0405   Coping/Psychosocial Response Interventions   Plan Of Care Reviewed With patient   Patient Care Overview   Progress improving   Outcome Evaluation   Outcome Summary/Follow up Plan VSS, some complaints of pain. Has chronic pain issues. Wire I&T. Elliott in place and draining. Will continue to monitor closely.        Goal: Adult Individualization and Mutuality  Outcome: Ongoing (interventions implemented as appropriate)  Goal: Discharge Needs Assessment  Outcome: Ongoing (interventions implemented as appropriate)    Problem: Cardiac Surgery (Adult)  Goal: Signs and Symptoms of Listed Potential Problems Will be Absent or Manageable (Cardiac Surgery)  Outcome: Ongoing (interventions implemented as appropriate)    04/13/17 0405   Cardiac Surgery   Problems Assessed (Cardiac Surgery) all   Problems Present (Cardiac Surgery) pain         Problem: Fall Risk (Adult)  Goal: Absence of Falls  Outcome: Ongoing (interventions implemented as appropriate)    04/13/17 0405   Fall Risk (Adult)   Absence of Falls making progress toward outcome         Problem: Skin Integrity Impairment, Risk/Actual (Adult)  Goal: Skin Integrity/Wound Healing  Outcome: Ongoing (interventions implemented as appropriate)    04/13/17 0405   Skin Integrity Impairment, Risk/Actual (Adult)   Skin Integrity/Wound Healing making progress toward outcome

## 2017-04-13 NOTE — PROGRESS NOTES
Discharge Planning Assessment  [facility]     Patient Name: Madhu Santoro  MRN: [id number]  Today's Date: 4/13/2017    Admit Date: 4/7/2017          Discharge Needs Assessment       04/13/17 1126    Living Environment    Lives With spouse    Living Arrangements house    Provides Primary Care For no one    Quality Of Family Relationships supportive;involved;helpful    Able to Return to Prior Living Arrangements yes    Discharge Needs Assessment    Concerns To Be Addressed no discharge needs identified    Readmission Within The Last 30 Days no previous admission in last 30 days    Outpatient/Agency/Support Group Needs homecare agency (specify level of care)    Equipment Currently Used at Home none    Equipment Needed After Discharge none    Discharge Facility/Level Of Care Needs home with home health    Transportation Available family or friend will provide    Discharge Disposition still a patient    Discharge Contact Information if Applicable Brooke Santoro spouse [phone]            Discharge Plan       04/13/17 1125    Case Management/Social Work Plan    Plan Home with assist of wife and [facility] to follow.     Patient/Family In Agreement With Plan yes    Additional Comments S/W Pt and his wife Brooke at bedside.  Introduced self and role of CCP.  Confirmed info on f/s.  Pt IADL'S, retired and drives.  Pt denies DME, HH & SNF in past.  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  Pt denies d/c needs.  Pt will d/c home with 24hr assist of wife and has chosen [facility] to follow.  Referral called to [facility].          Discharge Placement     Facility/Agency Request Status Selected? Address Phone Number Fax Number    [facility] Accepted    [address] [address] [phone]                Demographic Summary       04/13/17 1123    Referral Information    Admission Type inpatient    Primary Care Physician  Information    Name Damian Chen MD            Functional Status       04/13/17 1125    Functional Status Current    Ambulation 2-->assistive person    Transferring 2-->assistive person    Toileting 2-->assistive person    Bathing 2-->assistive person    Dressing 2-->assistive person    Eating 0-->independent    Communication 0-->understands/communicates without difficulty    Swallowing (if score 2 or more for any item, consult Rehab Services) 0-->swallows foods/liquids without difficulty    Change in Functional Status Since Onset of Current Illness/Injury yes   S/P OPEN HEART    Functional Status Prior    Ambulation 0-->independent    Transferring 0-->independent    Toileting 0-->independent    Bathing 0-->independent    Dressing 0-->independent    Eating 0-->independent    Communication 0-->understands/communicates without difficulty    Swallowing 0-->swallows foods/liquids without difficulty    Cognitive/Perceptual/Developmental    Current Mental Status/Cognitive Functioning no deficits noted            Psychosocial     None            Abuse/Neglect     None            Legal     None            Substance Abuse     None            Patient Forms     None          Margareth Pinto RN

## 2017-04-13 NOTE — PROGRESS NOTES
LOS: 6 days   Patient Care Team:  Damian Chen MD as PCP - General (Family Medicine)  Damian Chen MD as PCP - Claims Attributed - PLEASE DO NOT REMOVE  Temo Cortez MD as Surgeon (Cardiothoracic Surgery)    Chief Complaint: Follow-up for CAD, paroxysmal typical atrial flutter.    Interval History: Went into rapid atrial flutter this AM.  Rate 130's.  No CP other than wound soreness.  No SOA.      Vital Signs:  Temp:  [97.7 °F (36.5 °C)-98.8 °F (37.1 °C)] 98.8 °F (37.1 °C)  Heart Rate:  [] 136  Resp:  [16] 16  BP: (109-137)/(59-85) 114/85    Intake/Output Summary (Last 24 hours) at 04/13/17 0719  Last data filed at 04/13/17 0714   Gross per 24 hour   Intake              370 ml   Output              975 ml   Net             -605 ml       Physical Exam:   General Appearance:    No acute distress, alert and oriented x4   Lungs:     Clear to auscultation bilaterally     Heart:    Regular rhythm and tachycardic rate (2:1 flutter), no murmurs   Abdomen:     Soft, non-tender, non-distended.    Extremities:   Moves all extremities well.  No clubbing, cyanosis, or edema.     Results Review:      Results from last 7 days  Lab Units 04/13/17  0406   SODIUM mmol/L 139   POTASSIUM mmol/L 4.6   CHLORIDE mmol/L 101   TOTAL CO2 mmol/L 25.7   BUN mg/dL 11   CREATININE mg/dL 0.83   GLUCOSE mg/dL 118*   CALCIUM mg/dL 8.7       Results from last 7 days  Lab Units 04/08/17  0500 04/07/17  1820 04/07/17  1012   TROPONIN T ng/mL <0.010 <0.010 <0.010       Results from last 7 days  Lab Units 04/13/17  0406   WBC 10*3/mm3 12.83*   HEMOGLOBIN g/dL 13.5*   HEMATOCRIT % 42.2   PLATELETS 10*3/mm3 358       Results from last 7 days  Lab Units 04/12/17  0313 04/11/17  1059   INR  1.32* 1.54*   APTT seconds  --  33.3       Results from last 7 days  Lab Units 04/10/17  1520   CHOLESTEROL mg/dL 138       Results from last 7 days  Lab Units 04/12/17  0313   MAGNESIUM mg/dL 2.5*       Results from last 7 days  Lab Units 04/10/17  1520    CHOLESTEROL mg/dL 138   TRIGLYCERIDES mg/dL 68   HDL CHOL mg/dL 43       I reviewed the patient's new clinical results.        Assessment:  1. Unstable angina with severe CAD (left main) - status post 3 vessel CABG 4/11/17  2. Paroxysmal atrial flutter (typcial)  3. Hypertension  4. Hyperlipidemia  5. PVD, s/p CEA  6. Pre-op EF 55-60%    Plan:  -Will discontinue oral Amio and start Amio drip.  Give one dose IV Digoxin 500 mcg this AM.  Increase Metoprolol to 25 bid.    -Not surprising that atrial flutter recurrent post-op.  If becomes a major issue, can have Dr. Gustafson reassess for possible ablation.  -Continue ASA and Lipitor 40   -EKG in AM    Eric Wright MD  04/13/17  7:19 AM

## 2017-04-13 NOTE — NURSING NOTE
Met with patient & wife at [facility] in CVU.  Pt is POD # 3 and doing well today.  Gave appointment for 1st cardiac rehab visit on May 10, 2017 at 1:30 pm.  I  provided appointment packet which includes the date/time of the appointment, directions to the facility, and a list of what to bring with him.  I pointed out the number to call in case he should need to reschedule this appointment for any reason.   Wife asked for my name & phone number in case she has questions for me later, so I gave that to her.  I also did some post-op education about lifting restrictions, incision care, and weighing daily.

## 2017-04-13 NOTE — PROGRESS NOTES
Acute Care - Occupational Therapy Initial Evaluation  [facility]     Patient Name: Madhu Santoro  : 1945  MRN: [id number]  Today's Date: 2017  Onset of Illness/Injury or Date of Surgery Date: 17          Admit Date: 2017       ICD-10-CM ICD-9-CM   1. New onset atrial flutter I48.92 427.32   2. Coronary artery disease involving native coronary artery of native heart without angina pectoris I25.10 414.01   3. Generalized weakness R53.1 780.79     Patient Active Problem List   Diagnosis   • Anxiety   • Arthritis   • CAD (coronary artery disease)   • HLD (hyperlipidemia)   • Benign essential hypertension   • DDD (degenerative disc disease), lumbosacral   • Stroke   • Screening for prostate cancer   • Hypertension   • Gastroesophageal reflux disease   • Hyperlipidemia   • New onset atrial flutter     Past Medical History:   Diagnosis Date   • Anxiety    • Arthritis    • Benign essential hypertension    • CAD (coronary artery disease)    • Chest pain    • Colonic polyp    • DDD (degenerative disc disease), lumbosacral    • H/O bone density study    • H/O complete eye exam    • HLD (hyperlipidemia)    • Hypertension    • Lipid screening 2013   • Low back pain     [facility]   • Screening for prostate cancer 2015   • Stroke      Past Surgical History:   Procedure Laterality Date   • CARDIAC CATHETERIZATION N/A 4/10/2017    Procedure: Left Heart Cath;  Surgeon: Marjorie Healy MD;  Location: [facility] INVASIVE LOCATION;  Service:    • CARDIAC CATHETERIZATION N/A 4/10/2017    Procedure: Coronary angiography;  Surgeon: Marjorie Healy MD;  Location: [facility] CATH INVASIVE LOCATION;  Service:    • CARDIAC CATHETERIZATION N/A 4/10/2017    Procedure: Left ventriculography;  Surgeon: Marjorie Healy MD;  Location: [facility] CATH INVASIVE LOCATION;  Service:    • COLONOSCOPY     • CORONARY ARTERY BYPASS GRAFT N/A 2017    Procedure: AR STERNOTOMY  CORONARY ARTERY BYPASS GRAFT TIMES 3 USING LEFT INTERNAL MAMMARY ARTERY AND LEFT GREATER SAPHENOUS VEIN GRAFT PER ENDOSCOPIC VEIN HARVESTING AND PRP ;  Surgeon: Temo Cortez MD;  Location: [facility] MAIN OR;  Service:           OT ASSESSMENT FLOWSHEET (last 72 hours)      OT Evaluation       04/13/17 1044 04/13/17 1008 04/12/17 1456 04/12/17 1442 04/12/17 0848    Rehab Evaluation    Document Type evaluation  -MR (P)  therapy note (daily note)  -CH  evaluation  -NB evaluation  -EM    Subjective Information agree to therapy  -MR (P)  agree to therapy;complains of;pain  -CH   agree to therapy;complains of;pain  -EM    Evaluation Not Performed   patient/family declined evaluation   Pt declines OT due to pain. Nsg aware of pt c/o pain 1440  -SG      Patient Effort, Rehab Treatment  (P)  adequate  -CH       Symptoms Noted During/After Treatment  (P)  shortness of breath  -CH  none  -NB     General Information    Onset of Illness/Injury or Date of Surgery Date     04/11/17  -EM    General Observations      male sitting up in chair, awake and alert  -EM    Pertinent History Of Current Problem     CABG x3  -EM    Precautions/Limitations cardiac precautions;fall precautions;sternal precautions;oxygen therapy device and L/min  -MR (P)  cardiac precautions;fall precautions;oxygen therapy device and L/min;sternal precautions   2L/min  -   cardiac precautions;sternal precautions  -EM    Prior Level of Function     independent:;community mobility  -EM    Equipment Currently Used at Home     none  -EM    Plans/Goals Discussed With     patient  -EM    Living Environment    Lives With spouse  -MR    spouse  -EM    Living Arrangements house  -MR    house  -EM    Home Accessibility     no concerns  -EM    Clinical Impression    Impairments Found (describe specific impairments) aerobic capacity/endurance;gait, locomotion, and balance  -MR        Patient/Family Goals Statement return home  -MR        Criteria for Skilled  Therapeutic Interventions Met yes;treatment indicated  -MR        Rehab Potential good, to achieve stated therapy goals  -MR        Therapy Frequency 3-5 times/wk  -MR        Anticipated Discharge Disposition home with assist  -MR        Vital Signs    Pre Systolic BP Rehab  (P)  129  -CH   109  -EM    Pre Treatment Diastolic BP  (P)  77  -CH   59  -EM    Post Systolic BP Rehab     141  -EM    Post Treatment Diastolic BP     75  -EM    Pretreatment Heart Rate (beats/min)  (P)  113  -CH   54  -EM    Posttreatment Heart Rate (beats/min)  (P)  113  -CH   65  -EM    Pre SpO2 (%)  (P)  93  -CH   96  -EM    O2 Delivery Pre Treatment  (P)  supplemental O2  -CH   supplemental O2  -EM    Post SpO2 (%)  (P)  94  -CH   96  -EM    O2 Delivery Post Treatment  (P)  supplemental O2  -CH   supplemental O2  -EM    Pain Assessment    Pain Assessment 0-10  -MR (P)  0-10  -CH  --  -NB 0-10  -EM    Pain Score 3  -MR (P)  5  -CH  10  -NB 6  -EM    Pain Location --   pt reports soreness in chest  -MR (P)  Back  -CH  Back   chest tube location  -NB Back  -EM    Pain Intervention(s)  (P)  Repositioned  -  --   RN aware, max pain meds at this time  -NB Medication (See MAR)  -EM    Cognitive Assessment/Intervention    Current Cognitive/Communication Assessment functional  -MR (P)  functional  -CH  functional  -NB functional  -EM    Orientation Status oriented x 4  -MR (P)  oriented x 4  -CH  oriented x 4  -NB oriented x 4  -EM    Follows Commands/Answers Questions 100% of the time;able to follow single-step instructions  -MR (P)  100% of the time;able to follow single-step instructions  -  100% of the time  -% of the time  -EM    Personal Safety WNL/WFL  -MR (P)  WNL/WFL  -CH   WNL/WFL  -EM    Personal Safety Interventions fall prevention program maintained  -MR (P)  fall prevention program maintained;nonskid shoes/slippers when out of bed  -CH       ROM (Range of Motion)    General ROM no range of motion deficits identified  -MR     no range of motion deficits identified  -EM    MMT (Manual Muscle Testing)    General MMT Assessment     no strength deficits identified   generalized post op weakness noted, no focal deficits  -EM    General MMT Assessment Detail BUE > or = 3+/5  -MR        Bed Mobility, Assessment/Treatment    Bed Mob, Supine to Sit, [place] not tested   pt up in chair  -MR (P)  not tested  -CH   not tested  -EM    Bed Mob, Sit to Supine, [place]  (P)  not tested  -CH   not tested  -EM    Bed Mobility, Comment  (P)  up in chair  -       Transfer Assessment/Treatment    Transfers, Sit-Stand [place] minimum assist (75% patient effort)  -MR (P)  moderate assist (50% patient effort)  -   minimum assist (75% patient effort);verbal cues required  -EM    Transfers, Stand-Sit [place] minimum assist (75% patient effort)  -MR (P)  minimum assist (75% patient effort);2 person assist required  -   minimum assist (75% patient effort)  -EM    Transfer, Impairments  (P)  strength decreased  -       Lower Body Dressing Assessment/Training    LB Dressing Assess/Train, Clothing Type donning:;slipper socks  -        LB Dressing Assess/Train, [place] supervision required;set up required  -        Motor Skills/Interventions    Additional Documentation     Balance Skills Training (Group)  -EM    Balance Skills Training    Sitting-Level of Assistance     Distant supervision  -EM    Standing-Level of Assistance Contact guard  -MR    Contact guard  -EM    Gait Balance-Level of Assistance     Minimum assistance  -EM    Therapy Exercises    Exercise Protocols  (P)  --   level III cardiac protocol 5 reps each  -   --   5 reps cardiac protocol, Level 2  -EM    Sensory Assessment/Intervention    Sensory Impairment --   pt denies numbness/tingling in UEs  -MR        General Therapy Interventions    Planned Therapy Interventions activity intolerance;ADL retraining;strengthening;transfer training  -        Positioning and  Restraints    Pre-Treatment Position sitting in chair/recliner  -MR (P)  sitting in chair/recliner  -CH   sitting in chair/recliner  -EM    Post Treatment Position chair  -MR    chair  -EM    In Chair reclined;notified nsg;call light within reach;encouraged to call for assist  -MR (P)  reclined;call light within reach;encouraged to call for assist;with family/caregiver  -   call light within reach;reclined  -EM      04/11/17 0031                General Information    Equipment Currently Used at Home none  -TW        Living Environment    Transportation Available family or friend will provide  -TW          User Key  (r) = Recorded By, (t) = Taken By, (c) = Cosigned By    Initials Name Effective Dates    SG Jodie Garrison, OTR 04/13/15 -     NB Vi Handy, MS CCC-SLP 04/13/15 -     TW Henna Wakefield, RN 06/16/16 -     EM Susan Hurtado, PT 12/01/15 -     MR Deedee Mancia, OT 06/22/16 -     CH Miguel Stephenson, PT Student 01/31/17 -            Occupational Therapy Education     Title: PT OT SLP Therapies (Active)     Topic: Occupational Therapy (Done)     Point: ADL training (Done)    Description: Instruct learner(s) on proper safety adaptation and remediation techniques during self care or transfers.   Instruct in proper use of assistive devices.    Learning Progress Summary    Learner Readiness Method Response Comment Documented by Status   Patient Acceptance E VU Educated on role of OT; OT POC. MR 04/13/17 1052 Done                      User Key     Initials Effective Dates Name Provider Type Discipline     06/22/16 -  Deedee Mancia, OT Occupational Therapist OT                  OT Recommendation and Plan  Anticipated Discharge Disposition: home with assist  Planned Therapy Interventions: activity intolerance, ADL retraining, strengthening, transfer training  Therapy Frequency: 3-5 times/wk  Plan of Care Review  Plan Of Care Reviewed With: patient  Outcome Summary/Follow up Plan: Pt seen for initial  evaluation, recommend outpatient OT services to increase safety and independence with ADLs.           OT Goals       04/13/17 1053          Transfer Training OT LTG    Transfer Training OT LTG, Date Established 04/13/17  -MR      Transfer Training OT LTG, Time to Achieve 1 wk  -MR      Transfer Training OT LTG, Activity Type bed to chair /chair to bed;toilet  -MR      Transfer Training OT LTG, [place] Level contact guard assist  -MR      ADL OT LTG    ADL OT LTG, Date Established 04/13/17  -MR      ADL OT LTG, Time to Achieve 1 wk  -MR      ADL OT LTG, Activity Type ADL skills  -MR      ADL OT LTG, [place] Level contact guard;standby assist  -MR      Activity Tolerance OT LTG    Activity Tolerance Goal OT LTG, Date Established 04/13/17  -MR      Activity Tolerance Goal OT LTG, Time to Achieve 1 wk  -MR      Activity Tolerance Goal OT LTG, Additional Goal 5-10 minutes activity standing at sink for ADLs  -MR        User Key  (r) = Recorded By, (t) = Taken By, (c) = Cosigned By    Initials Name Provider Type    MR Deedee Mancia, OT Occupational Therapist                Outcome Measures       04/13/17 1059 04/13/17 1000 04/12/17 0800    How much help from another person do you currently need...    Turning from your back to your side while in flat bed without using bedrails?  3  -EM (r) CH (t) EM (c) 3  -EM    Moving from lying on back to sitting on the side of a flat bed without bedrails?  2  -EM (r) CH (t) EM (c) 2  -EM    Moving to and from a bed to a chair (including a wheelchair)?  3  -EM (r) CH (t) EM (c) 3  -EM    Standing up from a chair using your arms (e.g., wheelchair, bedside chair)?  3  -EM (r) CH (t) EM (c) 3  -EM    Climbing 3-5 steps with a railing?  2  -EM (r) CH (t) EM (c) 2  -EM    To walk in hospital room?  3  -EM (r) CH (t) EM (c) 3  -EM    AM-PAC 6 Clicks Score  16  -EM (r) CH (t) 16  -EM    How much help from another is currently needed...    Putting on and taking off regular lower  body clothing? 3  -MR      Bathing (including washing, rinsing, and drying) 3  -MR      Toileting (which includes using toilet bed pan or urinal) 3  -MR      Putting on and taking off regular upper body clothing 3  -MR      Taking care of personal grooming (such as brushing teeth) 3  -MR      Eating meals 4  -MR      Score 19  -MR      Functional Assessment    Outcome Measure Options AM-PAC 6 Clicks Daily Activity (OT)  -MR AM-PAC 6 Clicks Basic Mobility (PT)  -EM (r) CH (t) EM (c) AM-PAC 6 Clicks Basic Mobility (PT)  -EM      User Key  (r) = Recorded By, (t) = Taken By, (c) = Cosigned By    Initials Name Provider Type    EM Susan Hurtado, PT Physical Therapist    MR Madai Gavino, OT Occupational Therapist    CH Miguel Stephenson, PT Student PT Student          Time Calculation:   OT Start Time: 0833  OT Stop Time: 0847  OT Time Calculation (min): 14 min    Therapy Charges for Today     Code Description Service Date Service Provider Modifiers Qty    [id number]  OT EVAL LOW COMPLEXITY 2 4/13/2017 Deedee Mancia OT GO 1               Deedee Mancia OT  4/13/2017

## 2017-04-13 NOTE — PLAN OF CARE
Problem: Patient Care Overview (Adult)  Goal: Plan of Care Review    04/13/17 1053   Coping/Psychosocial Response Interventions   Plan Of Care Reviewed With patient   Outcome Evaluation   Outcome Summary/Follow up Plan Pt seen for initial evaluation, recommend outpatient OT services to increase safety and independence with ADLs.          Problem: Inpatient Occupational Therapy  Goal: Transfer Training Goal 1 LTG- OT    04/13/17 1053   Transfer Training OT LTG   Transfer Training OT LTG, Date Established 04/13/17   Transfer Training OT LTG, Time to Achieve 1 wk   Transfer Training OT LTG, Activity Type bed to chair /chair to bed;toilet   Transfer Training OT LTG, [place] Level contact guard assist       Goal: ADL Goal LTG- OT    04/13/17 1053   ADL OT LTG   ADL OT LTG, Date Established 04/13/17   ADL OT LTG, Time to Achieve 1 wk   ADL OT LTG, Activity Type ADL skills   ADL OT LTG, [place] Level contact guard;standby assist       Goal: Activity Tolerance Goal LTG- OT    04/13/17 1053   Activity Tolerance OT LTG   Activity Tolerance Goal OT LTG, Date Established 04/13/17   Activity Tolerance Goal OT LTG, Time to Achieve 1 wk   Activity Tolerance Goal OT LTG, Additional Goal 5-10 minutes activity standing at sink for ADLs

## 2017-04-13 NOTE — PLAN OF CARE
Problem: Patient Care Overview (Adult)  Goal: Plan of Care Review    04/13/17 1154   Coping/Psychosocial Response Interventions   Plan Of Care Reviewed With patient;spouse   Patient Care Overview   Progress improving   Outcome Evaluation   Outcome Summary/Follow up Plan Re-eval of swallow completed. More willing to consume PO today &less pain. Start nectar thick w/ regular         Problem: Inpatient SLP  Goal: Dysphagia- Patient will safely consume diet as per recommendation with no signs/symptoms of aspiration    04/13/17 1154   Safely Consume Diet   Safely Consume Diet- SLP, Date Established 04/13/17   Safely Consume Diet- SLP, Time to Achieve by discharge   Safely Consume Diet- SLP, Additional Goal nectar thick w/ regular   Safely Consume Diet- SLP, Date Goal Reviewed 04/13/17   Safely Consume Diet- SLP, Outcome goal revised

## 2017-04-13 NOTE — PLAN OF CARE
Problem: Patient Care Overview (Adult)  Goal: Plan of Care Review  Outcome: Ongoing (interventions implemented as appropriate)  Goal: Adult Individualization and Mutuality  Outcome: Ongoing (interventions implemented as appropriate)  Goal: Discharge Needs Assessment  Outcome: Ongoing (interventions implemented as appropriate)    Problem: Cardiac Surgery (Adult)  Goal: Signs and Symptoms of Listed Potential Problems Will be Absent or Manageable (Cardiac Surgery)  Outcome: Ongoing (interventions implemented as appropriate)    Problem: Fall Risk (Adult)  Goal: Absence of Falls  Outcome: Ongoing (interventions implemented as appropriate)    Problem: Skin Integrity Impairment, Risk/Actual (Adult)  Goal: Skin Integrity/Wound Healing  Outcome: Ongoing (interventions implemented as appropriate)

## 2017-04-13 NOTE — PROGRESS NOTES
Acute Care - Speech Language Pathology   Swallow Re-Evaluation [facility]     Patient Name: Madhu Santoro  : 1945  MRN: [id number]  Today's Date: 2017  Onset of Illness/Injury or Date of Surgery Date: 17            Admit Date: 2017    SPEECH-LANGUAGE PATHOLOGY EVALUATION - SWALLOW  Subjective: The patient was seen on this date for a Clinical Swallow evaluation.  Patient was alert and cooperative.  Feeling better, chest tubes were removed.   Objective: Textures given included thin liquid, nectar thick liquid, puree consistency, mechanical soft consistency and regular consistency.  Assessment: Throat clearing w/ trials of thin; per pt & wife he has been coughing & throat clearing when drinking today.  Also noted to cough w/ mixed consistencies.  Overall the pt is still weak, however he does report feeling better now that his pain is less.  He was willing to trial more solids today & feels he could eat regular textures.    SLP Findings:  Patient presents with mild oropharyngeal dysphagia, without esophageal component.   Recommendations: Diet Textures: nectar thick liquid, regular consistency food, no mixed consistencies.  Medications should be taken whole or crushed with thickened liquids or puree. May have water and ice between meals after oral care, under staff or family supervision and with the recommended strategies for safe swallowing.   Recommended Strategies: Upright for PO, small bites and sips and no straw. Oral care before breakfast, after all meals and PRN.  Other Recommended Evaluations: Re-evaluation at bedside Monday     Visit Dx:     ICD-10-CM ICD-9-CM   1. New onset atrial flutter I48.92 427.32   2. Coronary artery disease involving native coronary artery of native heart without angina pectoris I25.10 414.01   3. Generalized weakness R53.1 780.79     Patient Active Problem List   Diagnosis   • Anxiety   • Arthritis   • CAD (coronary artery disease)   • HLD (hyperlipidemia)    • Benign essential hypertension   • DDD (degenerative disc disease), lumbosacral   • Stroke   • Screening for prostate cancer   • Hypertension   • Gastroesophageal reflux disease   • Hyperlipidemia   • New onset atrial flutter     Past Medical History:   Diagnosis Date   • Anxiety    • Arthritis    • Benign essential hypertension    • CAD (coronary artery disease)    • Chest pain    • Colonic polyp    • DDD (degenerative disc disease), lumbosacral    • H/O bone density study 2013   • H/O complete eye exam 2014   • HLD (hyperlipidemia)    • Hypertension    • Lipid screening 05/31/2013   • Low back pain     physical therapy [facility] rehab 5-12-10   • Screening for prostate cancer 07/07/2015   • Stroke      Past Surgical History:   Procedure Laterality Date   • CARDIAC CATHETERIZATION N/A 4/10/2017    Procedure: Left Heart Cath;  Surgeon: Marjorie Healy MD;  [facility]AS[facility];  Service:    • CARDIAC CATHETERIZATION N/A 4/10/2017    Procedure: Coronary angiography;  Surgeon: Marjorie Healy MD;  [facility]IVE LOC[facility];  Service:    • CARDIAC CATHETERIZATION N/A 4/10/2017    Procedure: Left ventriculography;  Surgeon: Marjorie Healy MD;  Location: [facility] CATH [facility]ASIVE LOC[facility];  Service:    • COLONOSCOPY  2010   • CORONARY ARTERY BYPASS GRAFT N/A 4/11/2017    Procedure: AR STERNOTOMY CORONARY ARTERY BYPASS GRAFT TIMES 3 USING LEFT INTERNAL MAMMARY ARTERY AND LEFT GREATER SAPHENOUS VEIN GRAFT PER ENDOSCOPIC VEIN HARVESTING AND PRP ;  Surgeon: Temo Cortez MD;  Location: [facility] OR;  Service:           SWALLOW EVALUATION (last 72 hours)      Swallow Evaluation       04/13/17 1155 04/12/17 1442             Rehab Evaluation    Document Type re-evaluation  -NB evaluation  -NB       Symptoms Noted During/After Treatment none  -NB none  -NB       General Information    Patient Profile Review yes  -NB yes  -NB       Current Diet Limitations thin liquids;puree  -NB thin  liquids;other (see comments)   clear liquids  -NB       Precautions/Limitations, Vision  WFL  -NB       Precautions/Limitations, Hearing  WFL  -NB       Prior Level of Function- Communication  functional in all spheres  -NB       Prior Level of Function- Swallowing  no diet consistency restrictions  -NB       Plans/Goals Discussed With patient and family;agreed upon  -NB patient  -NB       Barriers to Rehab none identified  -NB none identified  -NB       Clinical Impression    Patient's Goals For Discharge patient did not state  -NB patient did not state  -NB       Family Goals For Discharge patient able to eat/drink without coughing/choking  -NB        SLP Swallowing Diagnosis mild dysphagia;oral dysfunction;pharyngeal dysfunction  -NB mild dysphagia;oral dysfunction;pharyngeal dysfunction  -NB       Rehab Potential/Prognosis, Swallowing good, to achieve stated therapy goals  -NB good, to achieve stated therapy goals  -NB       Criteria for Skilled Therapeutic Interventions Met skilled criteria for dysphagia intervention met  -NB skilled criteria for dysphagia intervention met  -NB       Therapy Frequency PRN  -NB PRN  -NB       Predicted Duration Therapy Interv (days) until discharge  -NB until discharge  -NB       Expected Duration Therapy Session (min) 15-30 minutes  -NB 15-30 minutes  -NB       SLP Diet Recommendation regular textures;nectar/syrup-thick liquids  -NB thin liquids;II - pureed  -NB       Recommended Diagnostics reassess via clinical swallow (non-instrumental exam)  -NB reassess via clinical swallow (non-instrumental exam)  -NB       Recommended Feeding/Eating Techniques small sips/bites;no straws  -NB small sips/bites  -NB       SLP Rec. for Method of Medication Administration meds whole in pudding/applesauce;meds crushed in pudding/applesauce  -NB meds crushed in pudding/applesauce  -NB       Monitor For Signs Of Aspiration cough;gurgly voice;throat clearing  -NB cough;gurgly voice;throat  clearing  -NB       Anticipated Discharge Disposition other (see comments)   unknown  -NB other (see comments)   unknown  -NB       Pain Assessment    Pain Assessment No/denies pain  -NB --  -NB       Pain Score  10  -NB       Pain Location  Back   chest tube location  -NB       Pain Intervention(s)  --   RN aware, max pain meds at this time  -NB       Cognitive Assessment/Intervention    Current Cognitive/Communication Assessment functional  -NB functional  -NB       Orientation Status oriented x 4  -NB oriented x 4  -NB       Follows Commands/Answers Questions  100% of the time  -NB       Oral Motor Structure and Function    Oral Motor Anatomy and Physiology patient demonstrates anatomy that is WNL  -NB patient demonstrates anatomy that is WNL  -NB       Dentition Assessment  present and adequate  -NB       Secretion Management  WNL/WFL  -NB       Mucosal Quality  moist, healthy  -NB       Velar Elevation  WNL (within normal limits)  -NB       Volitional Swallow  no difficulties initiating volitional swallow  -NB       Volitional Cough  no difficulties initiating volitional cough  -NB       Oral Musculature General Assessment  WNL (within normal limits)  -NB         User Key  (r) = Recorded By, (t) = Taken By, (c) = Cosigned By    Initials Name Effective Dates    ROBERT Handy MS [facility]-SLP 04/13/15 -         EDUCATION  The patient has been educated in the following areas:   Dysphagia (Swallowing Impairment).    SLP Recommendation and Plan  SLP Swallowing Diagnosis: mild dysphagia, oral dysfunction, pharyngeal dysfunction  SLP Diet Recommendation: regular textures, nectar/syrup-thick liquids  Recommended Feeding/Eating Techniques: small sips/bites, no straws  SLP Rec. for Method of Medication Administration: meds whole in pudding/applesauce, meds crushed in pudding/applesauce  Monitor For Signs Of Aspiration: cough, gurgly voice, throat clearing  Recommended Diagnostics: reassess via clinical swallow  (non-instrumental exam)  Criteria for Skilled Therapeutic Interventions Met: skilled criteria for dysphagia intervention met  Anticipated Discharge Disposition: other (see comments) (unknown)  Rehab Potential/Prognosis, Swallowing: good, to achieve stated therapy goals  Therapy Frequency: PRN             Plan of Care Review  Plan Of Care Reviewed With: patient, spouse  Progress: improving  Outcome Summary/Follow up Plan: Re-eval of swallow completed.  More willing to consume PO today &less pain.  Start nectar thick w/ regular          IP SLP Goals       04/13/17 1154          Safely Consume Diet    Safely Consume Diet- SLP, Date Established 04/13/17  -NB      Safely Consume Diet- SLP, Time to Achieve by discharge  -NB      Safely Consume Diet- SLP, Additional Goal nectar thick w/ regular  -NB      Safely Consume Diet- SLP, Date Goal Reviewed 04/13/17  -NB      Safely Consume Diet- SLP, Outcome goal revised  -NB        User Key  (r) = Recorded By, (t) = Taken By, (c) = Cosigned By    Initials Name Provider Type    ROBERT Handy MS CCC-SLP Speech and Language Pathologist             SLP Outcome Measures (last 72 hours)      SLP Outcome Measures       04/13/17 1155 04/12/17 1441       SLP Outcome Measures    Outcome Measure Used? Adult NOMS  -NB Adult NOMS  -NB     FCM Scores    FCM Chosen Swallowing  -NB Swallowing  -NB     Swallowing FCM Score 4  -NB 4  -NB       User Key  (r) = Recorded By, (t) = Taken By, (c) = Cosigned By    Initials Name Effective Dates    ROBERT Handy MS CCC-SLP 04/13/15 -            Time Calculation:         Time Calculation- SLP       04/13/17 1158          Time Calculation- SLP    SLP Start Time 1100  -NB      SLP Stop Time 1200  -NB      SLP Time Calculation (min) 60 min  -NB      SLP Received On 04/13/17  -NB        User Key  (r) = Recorded By, (t) = Taken By, (c) = Cosigned By    Initials Name Provider Type    ROBERT Handy MS CCC-SLP Speech and Language Pathologist           Therapy Charges for Today     Code Description Service Date Service Provider Modifiers Qty    18732450981  ST EVAL ORAL PHARYNG SWALLOW 4 4/12/2017 Vi Handy MS CCC-SLP GN 1    [id number]  ST TREATMENT SWALLOW 4 4/13/2017 Vi Handy MS CCC-SLP GN 1               Vi Handy MS CCC-SLP  4/13/2017

## 2017-04-13 NOTE — NURSING NOTE
PT converted to afib in the 120-160's at 0546. Cardiology(Kat) paged, waiting call back. BP stable, not symptomatic. Will continue to monitor.

## 2017-04-14 ENCOUNTER — APPOINTMENT (OUTPATIENT)
Dept: GENERAL RADIOLOGY | Facility: HOSPITAL | Age: 72
End: 2017-04-14

## 2017-04-14 LAB
ABO + RH BLD: NORMAL
ABO + RH BLD: NORMAL
ANION GAP SERPL CALCULATED.3IONS-SCNC: 13.6 MMOL/L
BH BB BLOOD EXPIRATION DATE: NORMAL
BH BB BLOOD EXPIRATION DATE: NORMAL
BH BB BLOOD TYPE BARCODE: 9500
BH BB BLOOD TYPE BARCODE: 9500
BH BB DISPENSE STATUS: NORMAL
BH BB DISPENSE STATUS: NORMAL
BH BB PRODUCT CODE: NORMAL
BH BB PRODUCT CODE: NORMAL
BH BB UNIT NUMBER: NORMAL
BH BB UNIT NUMBER: NORMAL
BUN BLD-MCNC: 10 MG/DL (ref 8–23)
BUN/CREAT SERPL: 13.5 (ref 7–25)
CALCIUM SPEC-SCNC: 9.2 MG/DL (ref 8.6–10.5)
CHLORIDE SERPL-SCNC: 98 MMOL/L (ref 98–107)
CO2 SERPL-SCNC: 23.4 MMOL/L (ref 22–29)
CREAT BLD-MCNC: 0.74 MG/DL (ref 0.76–1.27)
DEPRECATED RDW RBC AUTO: 51 FL (ref 37–54)
ERYTHROCYTE [DISTWIDTH] IN BLOOD BY AUTOMATED COUNT: 14.3 % (ref 11.5–14.5)
GFR SERPL CREATININE-BSD FRML MDRD: 104 ML/MIN/1.73
GLUCOSE BLD-MCNC: 121 MG/DL (ref 65–99)
GLUCOSE BLDC GLUCOMTR-MCNC: 108 MG/DL (ref 70–130)
GLUCOSE BLDC GLUCOMTR-MCNC: 114 MG/DL (ref 70–130)
GLUCOSE BLDC GLUCOMTR-MCNC: 127 MG/DL (ref 70–130)
GLUCOSE BLDC GLUCOMTR-MCNC: 89 MG/DL (ref 70–130)
HCT VFR BLD AUTO: 45.8 % (ref 40.4–52.2)
HGB BLD-MCNC: 14.7 G/DL (ref 13.7–17.6)
INR PPP: 1.13 (ref 0.9–1.1)
MCH RBC QN AUTO: 31.1 PG (ref 27–32.7)
MCHC RBC AUTO-ENTMCNC: 32.1 G/DL (ref 32.6–36.4)
MCV RBC AUTO: 97 FL (ref 79.8–96.2)
PLATELET # BLD AUTO: 313 10*3/MM3 (ref 140–500)
PMV BLD AUTO: 10.7 FL (ref 6–12)
POTASSIUM BLD-SCNC: 4.2 MMOL/L (ref 3.5–5.2)
PROTHROMBIN TIME: 14.1 SECONDS (ref 11.7–14.2)
RBC # BLD AUTO: 4.72 10*6/MM3 (ref 4.6–6)
SODIUM BLD-SCNC: 135 MMOL/L (ref 136–145)
UNIT  ABO: NORMAL
UNIT  ABO: NORMAL
UNIT  RH: NORMAL
UNIT  RH: NORMAL
WBC NRBC COR # BLD: 10.55 10*3/MM3 (ref 4.5–10.7)

## 2017-04-14 PROCEDURE — 85610 PROTHROMBIN TIME: CPT | Performed by: THORACIC SURGERY (CARDIOTHORACIC VASCULAR SURGERY)

## 2017-04-14 PROCEDURE — 25010000002 AMIODARONE IN DEXTROSE 5% 360 MG/200ML SOLUTION: Performed by: INTERNAL MEDICINE

## 2017-04-14 PROCEDURE — 99232 SBSQ HOSP IP/OBS MODERATE 35: CPT | Performed by: INTERNAL MEDICINE

## 2017-04-14 PROCEDURE — 25010000002 ENOXAPARIN PER 10 MG: Performed by: THORACIC SURGERY (CARDIOTHORACIC VASCULAR SURGERY)

## 2017-04-14 PROCEDURE — 25010000002 FUROSEMIDE PER 20 MG: Performed by: INTERNAL MEDICINE

## 2017-04-14 PROCEDURE — 97110 THERAPEUTIC EXERCISES: CPT

## 2017-04-14 PROCEDURE — 97535 SELF CARE MNGMENT TRAINING: CPT

## 2017-04-14 PROCEDURE — 71020 HC CHEST PA AND LATERAL: CPT

## 2017-04-14 PROCEDURE — 93005 ELECTROCARDIOGRAM TRACING: CPT | Performed by: INTERNAL MEDICINE

## 2017-04-14 PROCEDURE — 80048 BASIC METABOLIC PNL TOTAL CA: CPT | Performed by: THORACIC SURGERY (CARDIOTHORACIC VASCULAR SURGERY)

## 2017-04-14 PROCEDURE — 82962 GLUCOSE BLOOD TEST: CPT

## 2017-04-14 PROCEDURE — 85027 COMPLETE CBC AUTOMATED: CPT | Performed by: THORACIC SURGERY (CARDIOTHORACIC VASCULAR SURGERY)

## 2017-04-14 PROCEDURE — 93010 ELECTROCARDIOGRAM REPORT: CPT | Performed by: INTERNAL MEDICINE

## 2017-04-14 RX ORDER — WARFARIN SODIUM 2 MG/1
2 TABLET ORAL
Status: DISCONTINUED | OUTPATIENT
Start: 2017-04-14 | End: 2017-04-16

## 2017-04-14 RX ORDER — FUROSEMIDE 10 MG/ML
40 INJECTION INTRAMUSCULAR; INTRAVENOUS ONCE
Status: COMPLETED | OUTPATIENT
Start: 2017-04-14 | End: 2017-04-14

## 2017-04-14 RX ADMIN — HYDROCODONE BITARTRATE AND ACETAMINOPHEN 2 TABLET: 7.5; 325 TABLET ORAL at 12:37

## 2017-04-14 RX ADMIN — METOPROLOL TARTRATE 50 MG: 50 TABLET ORAL at 21:31

## 2017-04-14 RX ADMIN — FUROSEMIDE 40 MG: 10 INJECTION, SOLUTION INTRAMUSCULAR; INTRAVENOUS at 08:51

## 2017-04-14 RX ADMIN — PANTOPRAZOLE SODIUM 40 MG: 40 TABLET, DELAYED RELEASE ORAL at 06:14

## 2017-04-14 RX ADMIN — ENOXAPARIN SODIUM 40 MG: 40 INJECTION SUBCUTANEOUS at 21:31

## 2017-04-14 RX ADMIN — WARFARIN SODIUM 2 MG: 2 TABLET ORAL at 18:24

## 2017-04-14 RX ADMIN — ASPIRIN 81 MG: 81 TABLET ORAL at 08:37

## 2017-04-14 RX ADMIN — HYDROCODONE BITARTRATE AND ACETAMINOPHEN 2 TABLET: 7.5; 325 TABLET ORAL at 08:37

## 2017-04-14 RX ADMIN — DOCUSATE SODIUM,SENNOSIDES 2 TABLET: 50; 8.6 TABLET, FILM COATED ORAL at 21:31

## 2017-04-14 RX ADMIN — HYDROCODONE BITARTRATE AND ACETAMINOPHEN 1 TABLET: 7.5; 325 TABLET ORAL at 18:24

## 2017-04-14 RX ADMIN — MUPIROCIN CALCIUM: 20 OINTMENT TOPICAL at 12:38

## 2017-04-14 RX ADMIN — AMIODARONE HYDROCHLORIDE 0.5 MG/MIN: 1.8 INJECTION, SOLUTION INTRAVENOUS at 18:07

## 2017-04-14 RX ADMIN — AMIODARONE HYDROCHLORIDE 0.5 MG/MIN: 1.8 INJECTION, SOLUTION INTRAVENOUS at 01:26

## 2017-04-14 RX ADMIN — METOPROLOL TARTRATE 50 MG: 50 TABLET ORAL at 08:36

## 2017-04-14 RX ADMIN — ATORVASTATIN CALCIUM 40 MG: 40 TABLET, FILM COATED ORAL at 21:31

## 2017-04-14 NOTE — PROGRESS NOTES
Acute Care - Physical Therapy Treatment Note  [facility]     Patient Name: Madhu Santoro  : 1945  MRN: [id number]  Today's Date: 2017  Onset of Illness/Injury or Date of Surgery Date: 17          Admit Date: 2017    Visit Dx:    ICD-10-CM ICD-9-CM   1. New onset atrial flutter I48.92 427.32   2. Coronary artery disease involving native coronary artery of native heart without angina pectoris I25.10 414.01   3. Generalized weakness R53.1 780.79     Patient Active Problem List   Diagnosis   • Anxiety   • Arthritis   • CAD (coronary artery disease)   • HLD (hyperlipidemia)   • Benign essential hypertension   • DDD (degenerative disc disease), lumbosacral   • Stroke   • Screening for prostate cancer   • Hypertension   • Gastroesophageal reflux disease   • Hyperlipidemia   • New onset atrial flutter               Adult Rehabilitation Note       17 0930 17 1008       Rehab Assessment/Intervention    Discipline (P)  physical therapist  -CH (P)  physical therapist  -CH     Document Type (P)  therapy note (daily note)  -CH (P)  therapy note (daily note)  -CH     Subjective Information (P)  agree to therapy;no complaints  -CH (P)  agree to therapy;complains of;pain  -CH     Patient Effort, Rehab Treatment (P)  adequate  -CH (P)  adequate  -CH     Symptoms Noted During/After Treatment (P)  none  -CH (P)  shortness of breath  -CH     Precautions/Limitations (P)  cardiac precautions;fall precautions;oxygen therapy device and L/min;sternal precautions   1.5L/min  -CH (P)  cardiac precautions;fall precautions;oxygen therapy device and L/min;sternal precautions   2L/min  -CH     Recorded by [CH] Miguel Stephenson, PT Student [CH] Miguel Stephenson PT Student     Vital Signs    Pre Systolic BP Rehab (P)  116  -CH (P)  129  -CH     Pre Treatment Diastolic BP (P)  78  -CH (P)  77  -CH     Pretreatment Heart Rate (beats/min) (P)  134  -CH (P)  113  -CH     Posttreatment Heart Rate (beats/min) (P)  135   -CH (P)  113  -CH     Pre SpO2 (%) (P)  92  -CH (P)  93  -CH     O2 Delivery Pre Treatment (P)  supplemental O2  -CH (P)  supplemental O2  -CH     Post SpO2 (%) (P)  95  -CH (P)  94  -CH     O2 Delivery Post Treatment (P)  supplemental O2  -CH (P)  supplemental O2  -CH     Recorded by [CH] Miguel Stephenson PT Student [CH] Miguel Stephenson PT Student     Pain Assessment    Pain Assessment (P)  No/denies pain  -CH (P)  0-10  -CH     Pain Score  (P)  5  -CH     Pain Location  (P)  Back  -CH     Pain Intervention(s)  (P)  Repositioned  -CH     Recorded by [CH] Miguel Stephenson PT Student [CH] Mgiuel Stephenson PT Hayder     Cognitive Assessment/Intervention    Current Cognitive/Communication Assessment (P)  functional  -CH (P)  functional  -CH     Orientation Status (P)  oriented x 4  -CH (P)  oriented x 4  -CH     Follows Commands/Answers Questions (P)  100% of the time;able to follow single-step instructions  -CH (P)  100% of the time;able to follow single-step instructions  -     Personal Safety (P)  WNL/WFL  -CH (P)  WNL/WFL  -CH     Personal Safety Interventions (P)  fall prevention program maintained;nonskid shoes/slippers when out of bed  -CH (P)  fall prevention program maintained;nonskid shoes/slippers when out of bed  -CH     Recorded by [CH] Miguel Stephenson PT Student [CH] Miguel Stephenson PT Student     Bed Mobility, Assessment/Treatment    Bed Mob, Supine to Sit, Ness (P)  not tested  -CH (P)  not tested  -     Bed Mob, Sit to Supine, Ness (P)  not tested  -CH (P)  not tested  -CH     Bed Mobility, Comment (P)  up in chair  -CH (P)  up in chair  -CH     Recorded by [CH] Miguel Stephenson PT Student [CH] SHANELL Puentes     Transfer Assessment/Treatment    Transfers, Sit-Stand Ness (P)  stand by assist  -CH (P)  moderate assist (50% patient effort)  -CH     Transfers, Stand-Sit Ness (P)  stand by assist  -CH (P)  minimum assist (75% patient effort);2 person assist  required  -CH     Transfer, Impairments  (P)  strength decreased  -CH     Recorded by [CH] Miguel Stephenson PT Student [CH] SHANELL Puentes     Gait Assessment/Treatment    Gait, Fleischmanns Level (P)  minimum assist (75% patient effort)  -CH (P)  minimum assist (75% patient effort);2 person assist required  -CH     Gait, Distance (Feet) (P)  150  -CH (P)  70  -CH     Gait, Gait Deviations  (P)  step length decreased;jemal decreased  -CH     Gait, Safety Issues (P)  supplemental O2  -CH (P)  supplemental O2;step length decreased  -CH     Gait, Impairments (P)  strength decreased  -CH (P)  strength decreased  -CH     Recorded by [CH] Miguel Stephenson PT Student [CH] SHANELL Puentes     Therapy Exercises    Exercise Protocols (P)  --   level IV cardiac protocol 10 reps each  -CH (P)  --   level III cardiac protocol 5 reps each  -CH     Recorded by [CH] Miguel Stephenson PT Hayder [CH] SHANELL Puentes     Positioning and Restraints    Pre-Treatment Position (P)  sitting in chair/recliner  -CH (P)  sitting in chair/recliner  -CH     Post Treatment Position (P)  chair  -CH      In Chair (P)  sitting;call light within reach;encouraged to call for assist;with family/caregiver  -CH (P)  reclined;call light within reach;encouraged to call for assist;with family/caregiver  -CH     Recorded by [CH] Miguel Stephenson PT Student [CH] SHANELL Puentes       User Key  (r) = Recorded By, (t) = Taken By, (c) = Cosigned By    Initials Name Effective Dates     Miguel Stephenson PT Student 01/31/17 -                 IP PT Goals       04/12/17 0858          Cardiopulmonary PT LTG    Cardiopulmonary PT LTG, Date Established 04/12/17  -EM      Cardiopulmonary PT LTG, Time to Achieve 1 wk  -EM      Cardiopulmonary PT LTG, Level Level V  -EM        User Key  (r) = Recorded By, (t) = Taken By, (c) = Cosigned By    Initials Name Provider Type    EM Susan Hurtado, PT Physical Therapist           Physical Therapy Education     Title: PT OT SLP Therapies (Active)     Topic: Physical Therapy (Active)     Point: Mobility training (Done)    Learning Progress Summary    Learner Readiness Method Response Comment Documented by Status   Patient Acceptance ANDREA RAMÍREZ DU   04/14/17 0936 Done    Acceptance E NR   04/13/17 1013 Active    Acceptance E NR  EM 04/12/17 0857 Active   Family Acceptance ANDREA RAMÍREZ DU   04/14/17 0936 Done    Acceptance E NR   04/13/17 1013 Active               Point: Home exercise program (Active)    Learning Progress Summary    Learner Readiness Method Response Comment Documented by Status   Patient Acceptance E NR   04/12/17 0857 Active                      User Key     Initials Effective Dates Name Provider Type Discipline     12/01/15 -  Susan Hurtado, PT Physical Therapist PT     01/31/17 -  Miguel Stephenson, PT Student PT Student PT                    PT Recommendation and Plan  Anticipated Discharge Disposition: home with assist  Planned Therapy Interventions: bed mobility training, gait training, home exercise program  PT Frequency: daily  Plan of Care Review  Plan Of Care Reviewed With: (P) patient  Progress: (P) improving  Outcome Summary/Follow up Plan: (P) Pt presented to PT with no c/o pain, he only c/o having to use urinal frequently secondary to meds. Pt performed exercises today without issue. He was able to perform functional mobility with increased independence and ambulated all the way [facility] unit today displaying normal gait speed with min A. Pt will continue to benefit from skilled PT in order to improve functional mobility independence so he can D/C home with wife.          Outcome Measures       04/14/17 0900 04/13/17 1059 04/13/17 1000    How much help from another person do you currently need...    Turning from your back to your side while in flat bed without using bedrails? (P)  3  -CH  3  -EM (r) CH (t) EM (c)    Moving from lying on back to sitting  on the side of a flat bed without bedrails? (P)  3  -CH  2  -EM (r) CH (t) EM (c)    Moving to and from a bed to a chair (including a wheelchair)? (P)  3  -CH  3  -EM (r) CH (t) EM (c)    Standing up from a chair using your arms (e.g., wheelchair, bedside chair)? (P)  3  -CH  3  -EM (r) CH (t) EM (c)    Climbing 3-5 steps with a railing? (P)  3  -CH  2  -EM (r) CH (t) EM (c)    To walk in hospital room? (P)  3  -CH  3  -EM (r) CH (t) EM (c)    AM-PAC 6 Clicks Score (P)  18  -CH  16  -EM (r) CH (t)    How much help from another is currently needed...    Putting on and taking off regular lower body clothing?  3  -MR     Bathing (including washing, rinsing, and drying)  3  -MR     Toileting (which includes using toilet bed pan or urinal)  3  -MR     Putting on and taking off regular upper body clothing  3  -MR     Taking care of personal grooming (such as brushing teeth)  3  -MR     Eating meals  4  -MR     Score  19  -MR     Functional Assessment    Outcome Measure Options (P)  AM-PAC 6 Clicks Daily Activity (OT)  -CH AM-PAC 6 Clicks Daily Activity (OT)  -MR AM-PAC 6 Clicks Basic Mobility (PT)  -EM (r) CH (t) EM (c)      04/12/17 0800          How much help from another person do you currently need...    Turning from your back to your side while in flat bed without using bedrails? 3  -EM      Moving from lying on back to sitting on the side of a flat bed without bedrails? 2  -EM      Moving to and from a bed to a chair (including a wheelchair)? 3  -EM      Standing up from a chair using your arms (e.g., wheelchair, bedside chair)? 3  -EM      Climbing 3-5 steps with a railing? 2  -EM      To walk in hospital room? 3  -EM      AM-PAC 6 Clicks Score 16  -EM      Functional Assessment    Outcome Measure Options AM-PAC 6 Clicks Basic Mobility (PT)  -EM        User Key  (r) = Recorded By, (t) = Taken By, (c) = Cosigned By    Initials Name Provider Type    ARIEL Hurtado, PT Physical Therapist     Deedee Weldon  Gavino, OT Occupational Therapist     Miguel Stephenson, PT Student PT Student           Time Calculation:         PT Charges       04/14/17 0939          Time Calculation    Start Time (P)  0913  -      Stop Time (P)  0927  -      Time Calculation (min) (P)  14 min  -      PT Received On (P)  04/14/17  -      PT - Next Appointment (P)  04/15/17  -      PT Goal Re-Cert Due Date (P)  04/19/17  -        User Key  (r) = Recorded By, (t) = Taken By, (c) = Cosigned By    Initials Name Provider Type    CH Miguel Stephenson, PT Student PT Student          Therapy Charges for Today     Code Description Service Date Service Provider Modifiers Qty    38378537739 HC PT THER PROC EA 15 MIN 4/13/2017 Miguel Stephenson, PT Student GP 1    20222525199 HC PT THER SUPP EA 15 MIN 4/13/2017 Miguel Stephenson, PT Student GP 1    95720670556 HC PT THER PROC EA 15 MIN 4/14/2017 Miguel Stephenson, PT Student GP 1          PT G-Codes  Outcome Measure Options: (P) AM-PAC 6 Clicks Daily Activity (OT)    Miguel Stephenson PT Student  4/14/2017

## 2017-04-14 NOTE — PROGRESS NOTES
" LOS: 7 days   Patient Care Team:  Damian Chen MD as PCP - General (Family Medicine)  Damian Chen MD as PCP - Claims Attributed - PLEASE DO NOT REMOVE  Temo Cortez MD as Surgeon (Cardiothoracic Surgery)    Chief Complaint: f/u cabg    Subjective  Sitting up in chair. His HA from yesterday has resolved    Vital Signs  Temp:  [97.5 °F (36.4 °C)-98.1 °F (36.7 °C)] 97.8 °F (36.6 °C)  Heart Rate:  [101-132] 132  Resp:  [18] 18  BP: (108-135)/(74-89) 116/78  Body mass index is 27.7 kg/(m^2).    Intake/Output Summary (Last 24 hours) at 04/14/17 1056  Last data filed at 04/14/17 0907   Gross per 24 hour   Intake          1187.74 ml   Output             2050 ml   Net          -862.26 ml     I/O this shift:  In: -   Out: 100 [Urine:100]        Last 3 weights    04/11/17  1934 04/13/17  0705 04/14/17  0656   Weight: 183 lb (83 kg) 185 lb 3.2 oz (84 kg) 182 lb 3.2 oz (82.6 kg)         Objective:  General Appearance:  In no acute distress.    Vital signs: (most recent): Blood pressure 116/78, pulse (!) 132, temperature 97.8 °F (36.6 °C), temperature source Oral, resp. rate 18, height 68\" (172.7 cm), weight 182 lb 3.2 oz (82.6 kg), SpO2 92 %.    Lungs:  Normal respiratory rate and normal effort.    Heart: Tachycardia.  Irregular rhythm.  (Tele: afib -130)  Abdomen: Abdomen is soft and non-distended.  Bowel sounds are normal.     Extremities: There is no dependent edema.    Neurological: Patient is alert and oriented to person, place and time.    Skin:  Warm and dry.  (Incision c/d/i)          Results Review:   WBC WBC   Date Value Ref Range Status   04/14/2017 10.55 4.50 - 10.70 10*3/mm3 Final   04/13/2017 12.83 (H) 4.50 - 10.70 10*3/mm3 Final   04/12/2017 14.09 (H) 4.50 - 10.70 10*3/mm3 Final   04/11/2017 11.60 (H) 4.50 - 10.70 10*3/mm3 Final   04/11/2017 11.71 (H) 4.50 - 10.70 10*3/mm3 Final      HGB Hemoglobin   Date Value Ref Range Status   04/14/2017 14.7 13.7 - 17.6 g/dL Final   04/13/2017 13.5 (L) 13.7 - " 17.6 g/dL Final   04/12/2017 12.3 (L) 13.7 - 17.6 g/dL Final   04/11/2017 13.8 13.7 - 17.6 g/dL Final   04/11/2017 11.6 (L) 13.7 - 17.6 g/dL Final      HCT Hematocrit   Date Value Ref Range Status   04/14/2017 45.8 40.4 - 52.2 % Final   04/13/2017 42.2 40.4 - 52.2 % Final   04/12/2017 37.4 (L) 40.4 - 52.2 % Final   04/11/2017 41.6 40.4 - 52.2 % Final   04/11/2017 34.5 (L) 40.4 - 52.2 % Final      Platelets Platelets   Date Value Ref Range Status   04/14/2017 313 140 - 500 10*3/mm3 Final   04/13/2017 358 140 - 500 10*3/mm3 Final   04/12/2017 306 140 - 500 10*3/mm3 Final   04/11/2017 258 140 - 500 10*3/mm3 Final   04/11/2017 305 140 - 500 10*3/mm3 Final        PT/INR:    Protime   Date Value Ref Range Status   04/12/2017 15.9 (H) 11.7 - 14.2 Seconds Final   04/11/2017 17.9 (H) 11.7 - 14.2 Seconds Final   /  INR   Date Value Ref Range Status   04/12/2017 1.32 (H) 0.90 - 1.10 Final   04/11/2017 1.54 (H) 0.90 - 1.10 Final       Sodium Sodium   Date Value Ref Range Status   04/14/2017 135 (L) 136 - 145 mmol/L Final   04/13/2017 139 136 - 145 mmol/L Final   04/12/2017 141 136 - 145 mmol/L Final   04/11/2017 142 136 - 145 mmol/L Final   04/11/2017 142 136 - 145 mmol/L Final      Potassium Potassium   Date Value Ref Range Status   04/14/2017 4.2 3.5 - 5.2 mmol/L Final   04/13/2017 4.6 3.5 - 5.2 mmol/L Final   04/12/2017 4.4 3.5 - 5.2 mmol/L Final   04/11/2017 4.4 3.5 - 5.2 mmol/L Final   04/11/2017 4.3 3.5 - 5.2 mmol/L Final   04/11/2017 5.0 3.5 - 5.2 mmol/L Final      Chloride Chloride   Date Value Ref Range Status   04/14/2017 98 98 - 107 mmol/L Final   04/13/2017 101 98 - 107 mmol/L Final   04/12/2017 106 98 - 107 mmol/L Final   04/11/2017 108 (H) 98 - 107 mmol/L Final   04/11/2017 106 98 - 107 mmol/L Final      Bicarbonate CO2   Date Value Ref Range Status   04/14/2017 23.4 22.0 - 29.0 mmol/L Final   04/13/2017 25.7 22.0 - 29.0 mmol/L Final   04/12/2017 21.4 (L) 22.0 - 29.0 mmol/L Final   04/11/2017 18.3 (L) 22.0 - 29.0  mmol/L Final   04/11/2017 21.5 (L) 22.0 - 29.0 mmol/L Final      BUN BUN   Date Value Ref Range Status   04/14/2017 10 8 - 23 mg/dL Final   04/13/2017 11 8 - 23 mg/dL Final   04/12/2017 11 8 - 23 mg/dL Final   04/11/2017 13 8 - 23 mg/dL Final   04/11/2017 12 8 - 23 mg/dL Final      Creatinine Creatinine   Date Value Ref Range Status   04/14/2017 0.74 (L) 0.76 - 1.27 mg/dL Final   04/13/2017 0.83 0.76 - 1.27 mg/dL Final   04/12/2017 0.72 (L) 0.76 - 1.27 mg/dL Final   04/11/2017 1.05 0.76 - 1.27 mg/dL Final   04/11/2017 0.91 0.76 - 1.27 mg/dL Final      Calcium Calcium   Date Value Ref Range Status   04/14/2017 9.2 8.6 - 10.5 mg/dL Final   04/13/2017 8.7 8.6 - 10.5 mg/dL Final   04/12/2017 8.4 (L) 8.6 - 10.5 mg/dL Final   04/11/2017 8.8 8.6 - 10.5 mg/dL Final   04/11/2017 9.4 8.6 - 10.5 mg/dL Final      Magnesium Magnesium   Date Value Ref Range Status   04/12/2017 2.5 (H) 1.6 - 2.4 mg/dL Final   04/11/2017 2.3 1.6 - 2.4 mg/dL Final   04/11/2017 2.8 (H) 1.6 - 2.4 mg/dL Final   04/11/2017 3.2 (H) 1.6 - 2.4 mg/dL Final            aspirin 81 mg Oral Daily   atorvastatin 40 mg Oral Nightly   digoxin 250 mcg Oral Daily   enoxaparin 40 mg Subcutaneous Nightly   insulin aspart 0-9 Units Subcutaneous 4x Daily With Meals & Nightly   metoprolol tartrate 50 mg Oral Q12H   mupirocin  Each Nare Daily   pantoprazole 40 mg Oral Q AM   sennosides-docusate sodium 2 tablet Oral Nightly       amiodarone 0.5 mg/min Last Rate: 0.5 mg/min (04/14/17 0126)           Patient Active Problem List   Diagnosis Code   • Anxiety F41.9   • Arthritis M19.90   • CAD (coronary artery disease) I25.10   • HLD (hyperlipidemia) E78.5   • Benign essential hypertension I10   • DDD (degenerative disc disease), lumbosacral M51.37   • Stroke I63.9   • Screening for prostate cancer Z12.5   • Hypertension I10   • Gastroesophageal reflux disease K21.9   • Hyperlipidemia E78.5   • New onset atrial flutter I48.92       Assessment & Plan   POD 3 CABG  afib with RVR  -130, on amio gtt    Plan:  Encourage IS and ambulation   Continue amio gtt and metoprolol increased to 50 mg BID per cardiology  Lasix given this am  Will d/c tpw's today  Ok for a/c from CV surgery standpoint. Anticoagulate per cardiology        04/14/17  10:56 AM

## 2017-04-14 NOTE — PROGRESS NOTES
Acute Care - Occupational Therapy Progress Note  Psychiatric     Patient Name: Madhu Santoro  : 1945  MRN: [id number]  Today's Date: 2017  Onset of Illness/Injury or Date of Surgery Date: 17            Admit Date: 2017    Visit Dx:     ICD-10-CM ICD-9-CM   1. New onset atrial flutter I48.92 427.32   2. Coronary artery disease involving native coronary artery of native heart without angina pectoris I25.10 414.01   3. Generalized weakness R53.1 780.79     Patient Active Problem List   Diagnosis   • Anxiety   • Arthritis   • CAD (coronary artery disease)   • HLD (hyperlipidemia)   • Benign essential hypertension   • DDD (degenerative disc disease), lumbosacral   • Stroke   • Screening for prostate cancer   • Hypertension   • Gastroesophageal reflux disease   • Hyperlipidemia   • New onset atrial flutter             Adult Rehabilitation Note       17 1356 17 0930 17 1008    Rehab Assessment/Intervention    Discipline occupational therapist  -LE physical therapist  -PC,CH,PC2 physical therapist  -PC,CH,PC2    Document Type therapy note (daily note)  -LE therapy note (daily note)  -PC,CH,PC2 therapy note (daily note)  -PC,CH,PC2    Subjective Information agree to therapy  -LE agree to therapy;no complaints  -PC,CH,PC2 agree to therapy;complains of;pain  -PC,CH,PC2    Patient Effort, Rehab Treatment good  -LE adequate  -PC,CH,PC2 adequate  -PC,CH,PC2    Symptoms Noted During/After Treatment none  -LE none  -PC,CH,PC2 shortness of breath  -PC,CH,PC2    Precautions/Limitations cardiac precautions;fall precautions;oxygen therapy device and L/min   heart hugger  -LE cardiac precautions;fall precautions;oxygen therapy device and L/min;sternal precautions   1.5L/min  -PC,CH,PC2 cardiac precautions;fall precautions;oxygen therapy device and L/min;sternal precautions   2L/min  -PC,CH,PC2    Recorded by [LE] Alissa Upton OTR [PC,CH,PC2] Naomi Tucker, PT (r) Miguel Stephenson, PT Student  (t) Naomi Tucker PT (c) [PC,CH,PC2] Naomi Tucker, PT (r) Miguel Stephenson PT Student (t) Naomi Tucker, PT (c)    Vital Signs    Pre Systolic BP Rehab 115  -  -PC,CH,PC2 129  -PC,CH,PC2    Pre Treatment Diastolic BP 84  -LE 78  -PC,CH,PC2 77  -PC,CH,PC2    Pretreatment Heart Rate (beats/min) 130  -  -PC,CH,PC2 113  -PC,CH,PC2    Posttreatment Heart Rate (beats/min)  135  -PC,CH,PC2 113  -PC,CH,PC2    Pre SpO2 (%) 94  -LE 92  -PC,CH,PC2 93  -PC,CH,PC2    O2 Delivery Pre Treatment supplemental O2  -LE supplemental O2  -PC,CH,PC2 supplemental O2  -PC,CH,PC2    O2 Delivery Intra Treatment room air  -LE      Post SpO2 (%) 96  -LE 95  -PC,CH,PC2 94  -PC,CH,PC2    O2 Delivery Post Treatment supplemental O2  -LE supplemental O2  -PC,CH,PC2 supplemental O2  -PC,CH,PC2    Pre Patient Position Supine  -LE      Intra Patient Position Standing  -LE      Post Patient Position Supine  -LE      Recorded by [LE] Alissa Upton OTR [PC,CH,PC2] Naomi Tucker, PT (r) Miguel Stephenson PT Student (t) Naomi Tucker PT (c) [PC,CH,PC2] Naomi Tucker PT (r) Miguel Stephenson PT Student (t) Naomi Tucker PT (c)    Pain Assessment    Pain Assessment No/denies pain  -LE No/denies pain  -PC,CH,PC2 0-10  -PC,CH,PC2    Pain Score   5  -PC,CH,PC2    Pain Location   Back  -PC,CH,PC2    Pain Intervention(s)   Repositioned  -PC,CH,PC2    Recorded by [LE] Alissa Upton OTR [PC,CH,PC2] Naomi Tucker, PT (r) Miguel Stephenson PT Student (t) Naomi Tucker PT (c) [PC,CH,PC2] Naomi Tucker PT (r) Miguel Stephenson PT Student (t) Naomi Tucker PT (c)    Cognitive Assessment/Intervention    Current Cognitive/Communication Assessment  functional  -PC,CH,PC2 functional  -PC,CH,PC2    Orientation Status  oriented x 4  -PC,CH,PC2 oriented x 4  -PC,CH,PC2    Follows Commands/Answers Questions  100% of the time;able to follow single-step instructions  -PC,CH,PC2 100% of the time;able to follow single-step instructions  -PC,CH,PC2    Personal  Safety  WNL/WFL  -PC,CH,PC2 WNL/WFL  -PC,CH,PC2    Personal Safety Interventions  fall prevention program maintained;nonskid shoes/slippers when out of bed  -PC,CH,PC2 fall prevention program maintained;nonskid shoes/slippers when out of bed  -PC,CH,PC2    Recorded by  [PC,CH,PC2] Naomi Tucker, PT (r) Miguel Stephenson, PT Student (t) Naomi Tucker, PT (c) [PC,CH,PC2] Naomi G Garrett, PT (r) Miguel Stephenson, PT Student (t) Naomi Tucker, PT (c)    Bed Mobility, Assessment/Treatment    Bed Mob, Supine to Sit, [place] supervision required;verbal cues required  -LE not tested  -PC,CH,PC2 not tested  -PC,CH,PC2    Bed Mob, Sit to Supine, [place] supervision required;verbal cues required  -LE not tested  -PC,CH,PC2 not tested  -PC,CH,PC2    Bed Mobility, Comment  up in chair  -PC,CH,PC2 up in chair  -PC,CH,PC2    Recorded by [LE] JOSE LUIS Martinez [PC,CH,PC2] Naomi G Garrett, PT (r) Miguel Stephenson, PT Student (t) Naomi Tucker, PT (c) [PC,CH,PC2] Naomi G Garrett, PT (r) Miguel Stephenson, PT Student (t) Naomi Tucker, PT (c)    Transfer Assessment/Treatment    Transfers, Sit-Stand [place] stand by assist  -LE stand by assist  -PC,CH,PC2 moderate assist (50% patient effort)  -PC,CH,PC2    Transfers, Stand-Sit [place] stand by assist  -LE stand by assist  -PC,CH,PC2 minimum assist (75% patient effort);2 person assist required  -PC,CH,PC2    Transfer, Impairments   strength decreased  -PC,CH,PC2    Recorded by [LE] JOSE LUIS Martinez [PC,CH,PC2] Naomi G Garrett, PT (r) Miguel Stephenson, PT Student (t) Naomi Tucker, PT (c) [PC,CH,PC2] Naomi Tucker, PT (r) Miguel Stephenson, PT Student (t) Naomi Tucker, PT (c)    Gait Assessment/Treatment    Gait, [place] Level  minimum assist (75% patient effort)  -PC,CH,PC2 minimum assist (75% patient effort);2 person assist required  -PC,CH,PC2    Gait, Distance (Feet)  150  -PC,CH,PC2 70  -PC,CH,PC2    Gait, Gait Deviations   step length decreased;jemal decreased   -PC,CH,PC2    Gait, Safety Issues  supplemental O2  -PC,CH,PC2 supplemental O2;step length decreased  -PC,CH,PC2    Gait, Impairments  strength decreased  -PC,CH,PC2 strength decreased  -PC,CH,PC2    Recorded by  [PC,CH,PC2] Naomi Tucker, PT (r) Miguel Stephenson PT Student (t) Naomi Tucker PT (c) [PC,CH,PC2] Naomi Tucker, PT (r) Miguel Stephenson PT Student (t) Naomi Tucker PT (c)    Functional Mobility    Functional Mobility- Ind. Level supervision required  -LE      Functional Mobility-Distance (Feet) 15  -LE      Recorded by [LE] JOSE LUIS Martinez      Grooming Assessment/Training    Grooming Assess/Train, Position sink side;standing  -LE      Grooming Assess/Train, Indepen Level set up required  -LE      Recorded by [LE] JOSE LUIS Martinez      Balance Skills Training    Sitting-Level of Assistance Independent  -LE      Standing-Level of Assistance Distant supervision  -LE      Recorded by [LE] JOSE LUIS Martinez      Therapy Exercises    Exercise Protocols  --   level IV cardiac protocol 10 reps each  -PC,CH,PC2 --   level III cardiac protocol 5 reps each  -PC,CH,PC2    Recorded by  [PC,CH,PC2] Naomi Tucker, PT (r) Miguel Stephenson PT Student (t) Naomi Tucker PT (c) [PC,CH,PC2] Naomi Tucker PT (r) Miguel Stephenson PT Student (t) Naomi Tucker PT (c)    Positioning and Restraints    Pre-Treatment Position in bed  -LE sitting in chair/recliner  -PC,CH,PC2 sitting in chair/recliner  -PC,CH,PC2    Post Treatment Position bed  -LE chair  -PC,CH,PC2     In Bed notified nsg;call light within reach;encouraged to call for assist;heels elevated  -LE      In Chair  sitting;call light within reach;encouraged to call for assist;with family/caregiver  -PC,CH,PC2 reclined;call light within reach;encouraged to call for assist;with family/caregiver  -PC,CH,PC2    Recorded by [LE] JOSE LUIS Martinez [PC,CH,PC2] Naomi Tucker, PT (r) Miguel Stephenson PT Student (t) Naomi Tucker PT (c) [PC,CH,PC2] Naomi Tucker PT (r)  Miguel Stephenson, PT Student (t) Naomi Tucker, PT (c)      User Key  (r) = Recorded By, (t) = Taken By, (c) = Cosigned By    Initials Name Effective Dates    VELMA Upton, OTR 04/13/15 -     PC Naomi Tucker, PT 12/01/15 -     CH Miguel Stephenson, PT Student 01/31/17 -                 OT Goals       04/13/17 1053          Transfer Training OT LTG    Transfer Training OT LTG, Date Established 04/13/17  -MR      Transfer Training OT LTG, Time to Achieve 1 wk  -MR      Transfer Training OT LTG, Activity Type bed to chair /chair to bed;toilet  -MR      Transfer Training OT LTG, [place] Level contact guard assist  -MR      ADL OT LTG    ADL OT LTG, Date Established 04/13/17  -MR      ADL OT LTG, Time to Achieve 1 wk  -MR      ADL OT LTG, Activity Type ADL skills  -MR      ADL OT LTG, [place] Level contact guard;standby assist  -MR      Activity Tolerance OT LTG    Activity Tolerance Goal OT LTG, Date Established 04/13/17  -MR      Activity Tolerance Goal OT LTG, Time to Achieve 1 wk  -MR      Activity Tolerance Goal OT LTG, Additional Goal 5-10 minutes activity standing at sink for ADLs  -MR        User Key  (r) = Recorded By, (t) = Taken By, (c) = Cosigned By    Initials Name Provider Type    MR Deedee Mancia OT Occupational Therapist          Occupational Therapy Education     Title: PT OT SLP Therapies (Active)     Topic: Occupational Therapy (Done)     Point: ADL training (Done)    Description: Instruct learner(s) on proper safety adaptation and remediation techniques during self care or transfers.   Instruct in proper use of assistive devices.    Learning Progress Summary    Learner Readiness Method Response Comment Documented by Status   Patient Acceptance E VU Educated on role of OT; OT POC. MR 04/13/17 1052 Done                      User Key     Initials Effective Dates Name Provider Type Discipline    MR 06/22/16 -  Deedee Mancia OT Occupational Therapist OT                  OT  Recommendation and Plan  Anticipated Discharge Disposition: home with assist  Planned Therapy Interventions: activity intolerance, ADL retraining, strengthening, transfer training  Therapy Frequency: 3-5 times/wk  Plan of Care Review  Plan Of Care Reviewed With: patient  Progress: improving  Outcome Summary/Follow up Plan: Pt SBA to get OOB, walk to BR and groom at sink.  Slow moving and increase time to complete.         Outcome Measures       04/14/17 1359 04/14/17 1300 04/14/17 0900    How much help from another person do you currently need...    Turning from your back to your side while in flat bed without using bedrails?   3  -PC (r) CH (t) PC (c)    Moving from lying on back to sitting on the side of a flat bed without bedrails?   3  -PC (r) CH (t) PC (c)    Moving to and from a bed to a chair (including a wheelchair)?   3  -PC (r) CH (t) PC (c)    Standing up from a chair using your arms (e.g., wheelchair, bedside chair)?   3  -PC (r) CH (t) PC (c)    Climbing 3-5 steps with a railing?   3  -PC (r) CH (t) PC (c)    To walk in hospital room?   3  -PC (r) CH (t) PC (c)    AM-PAC 6 Clicks Score   18  -PC (r) CH (t)    How much help from another is currently needed...    Putting on and taking off regular lower body clothing? 3  -LE      Bathing (including washing, rinsing, and drying) 3  -LE      Toileting (which includes using toilet bed pan or urinal) 3  -LE      Putting on and taking off regular upper body clothing 3  -LE      Taking care of personal grooming (such as brushing teeth) 3  -LE      Eating meals 4  -LE      Score 19  -LE      Functional Assessment    Outcome Measure Options  AM-PAC 6 Clicks Daily Activity (OT)  -LE AM-PAC 6 Clicks Daily Activity (OT)  -PC (r) CH (t) PC (c)      04/13/17 1059 04/13/17 1000 04/12/17 0800    How much help from another person do you currently need...    Turning from your back to your side while in flat bed without using bedrails?  3  -EM (r) CH (t) EM (c) 3  -EM     Moving from lying on back to sitting on the side of a flat bed without bedrails?  2  -EM (r) CH (t) EM (c) 2  -EM    Moving to and from a bed to a chair (including a wheelchair)?  3  -EM (r) CH (t) EM (c) 3  -EM    Standing up from a chair using your arms (e.g., wheelchair, bedside chair)?  3  -EM (r) CH (t) EM (c) 3  -EM    Climbing 3-5 steps with a railing?  2  -EM (r) CH (t) EM (c) 2  -EM    To walk in hospital room?  3  -EM (r) CH (t) EM (c) 3  -EM    AM-PAC 6 Clicks Score  16  -EM (r) CH (t) 16  -EM    How much help from another is currently needed...    Putting on and taking off regular lower body clothing? 3  -MR      Bathing (including washing, rinsing, and drying) 3  -MR      Toileting (which includes using toilet bed pan or urinal) 3  -MR      Putting on and taking off regular upper body clothing 3  -MR      Taking care of personal grooming (such as brushing teeth) 3  -MR      Eating meals 4  -MR      Score 19  -MR      Functional Assessment    Outcome Measure Options AM-PAC 6 Clicks Daily Activity (OT)  -MR AM-PAC 6 Clicks Basic Mobility (PT)  -EM (r) CH (t) EM (c) AM-PAC 6 Clicks Basic Mobility (PT)  -EM      User Key  (r) = Recorded By, (t) = Taken By, (c) = Cosigned By    Initials Name Provider Type    JOSE LUIS Leslie Occupational Therapist    EDOUARD Tucker, PT Physical Therapist    EM Susan Hurtado, PT Physical Therapist    MR Deedee Reganbatool, OT Occupational Therapist    CH Miguel Stephenson, PT Student PT Student           Time Calculation:         Time Calculation- OT       04/14/17 1359          Time Calculation- OT    OT Start Time 1343  -LE      OT Stop Time 1355  -LE      OT Time Calculation (min) 12 min  -LE      OT Received On 04/14/17  -LE        User Key  (r) = Recorded By, (t) = Taken By, (c) = Cosigned By    Initials Name Provider Type    JOSE LUIS Leslie Occupational Therapist           Therapy Charges for Today     Code Description Service Date Service Provider Modifiers  Qty    [id number] HC OT SELF CARE/MGMT/TRAIN EA 15 MIN 4/14/2017 Alissa Upton, OTR GO 1               Alissa Upton, OTR  4/14/2017

## 2017-04-14 NOTE — PLAN OF CARE
Problem: Patient Care Overview (Adult)  Goal: Plan of Care Review  Outcome: Ongoing (interventions implemented as appropriate)    Problem: Cardiac Surgery (Adult)  Goal: Signs and Symptoms of Listed Potential Problems Will be Absent or Manageable (Cardiac Surgery)  Outcome: Ongoing (interventions implemented as appropriate)    Problem: Fall Risk (Adult)  Goal: Absence of Falls  Outcome: Ongoing (interventions implemented as appropriate)    Problem: Skin Integrity Impairment, Risk/Actual (Adult)  Goal: Skin Integrity/Wound Healing  Outcome: Ongoing (interventions implemented as appropriate)

## 2017-04-14 NOTE — PLAN OF CARE
Problem: Patient Care Overview (Adult)  Goal: Plan of Care Review  Outcome: Ongoing (interventions implemented as appropriate)    04/14/17 0936   Coping/Psychosocial Response Interventions   Plan Of Care Reviewed With patient   Patient Care Overview   Progress improving   Outcome Evaluation   Outcome Summary/Follow up Plan Pt presented to PT with no c/o pain, he only c/o having to use urinal frequently secondary to meds. Pt performed exercises today without issue. He was able to perform functional mobility with increased independence and ambulated all the way [facility] unit today displaying normal gait speed with min A. Pt will continue to benefit from skilled PT in order to improve functional mobility independence so he can D/C home with wife.

## 2017-04-14 NOTE — PROGRESS NOTES
LOS: 7 days   Patient Care Team:  Damian Chen MD as PCP - General (Family Medicine)  Damian Chen MD as PCP - Claims Attributed - PLEASE DO NOT REMOVE  Temo Cortez MD as Surgeon (Cardiothoracic Surgery)    Chief Complaint: Follow-up for CAD, paroxysmal typical atrial flutter.    Interval History: Still in rapid atrial flutter.  Had severe HA yesterday - states he thinks it was Digoxin.  No CP or SOA.     Vital Signs:  Temp:  [97.5 °F (36.4 °C)-98.5 °F (36.9 °C)] 97.7 °F (36.5 °C)  Heart Rate:  [101-137] 119  Resp:  [18-20] 18  BP: (108-135)/(74-89) 135/89    Intake/Output Summary (Last 24 hours) at 04/14/17 0738  Last data filed at 04/14/17 0600   Gross per 24 hour   Intake          1187.74 ml   Output             2275 ml   Net         -1087.26 ml       Physical Exam:   General Appearance:    No acute distress, alert and oriented x4   Lungs:     Clear to auscultation bilaterally     Heart:    Regular rhythm and tachycardic rate.  No murmurs, gallops, or       rubs.   Abdomen:     Soft, non-tender, non-distended.    Extremities:   Trace to 1+ edema.     Results Review:      Results from last 7 days  Lab Units 04/14/17  0341   SODIUM mmol/L 135*   POTASSIUM mmol/L 4.2   CHLORIDE mmol/L 98   TOTAL CO2 mmol/L 23.4   BUN mg/dL 10   CREATININE mg/dL 0.74*   GLUCOSE mg/dL 121*   CALCIUM mg/dL 9.2       Results from last 7 days  Lab Units 04/08/17  0500 04/07/17  1820 04/07/17  1012   TROPONIN T ng/mL <0.010 <0.010 <0.010       Results from last 7 days  Lab Units 04/14/17  0341   WBC 10*3/mm3 10.55   HEMOGLOBIN g/dL 14.7   HEMATOCRIT % 45.8   PLATELETS 10*3/mm3 313       Results from last 7 days  Lab Units 04/12/17  0313 04/11/17  1059   INR  1.32* 1.54*   APTT seconds  --  33.3       Results from last 7 days  Lab Units 04/10/17  1520   CHOLESTEROL mg/dL 138       Results from last 7 days  Lab Units 04/12/17  0313   MAGNESIUM mg/dL 2.5*       Results from last 7 days  Lab Units 04/10/17  1520   CHOLESTEROL  mg/dL 138   TRIGLYCERIDES mg/dL 68   HDL CHOL mg/dL 43       I reviewed the patient's new clinical results.        Assessment:  1. Unstable angina with severe CAD (left main) - status post 3 vessel CABG 4/11/17  2. Paroxysmal atrial flutter (typcial)  3. Hypertension  4. Hyperlipidemia  5. PVD, s/p CEA  6. Pre-op EF 55-60%    Plan:  -Continue Amiodarone gtt.  Will hold on IV Digoxin as patient concerned this gave him severe HA yesterday.  -Increase Metoprolol to 50 bid.  -If still rapid by Monday, will ask Dr. Gustafson to re-evaluate for possible atrial flutter ablation.  -Continue ASA and Lipitor  -Will ask Dr. Cortez when appropriate to restart anticoagulation (hopefully soon).    Eric Wright MD  04/14/17  7:38 AM

## 2017-04-14 NOTE — PLAN OF CARE
Problem: Patient Care Overview (Adult)  Goal: Plan of Care Review  Outcome: Ongoing (interventions implemented as appropriate)    04/14/17 0296   Coping/Psychosocial Response Interventions   Plan Of Care Reviewed With patient   Patient Care Overview   Progress improving   Outcome Evaluation   Outcome Summary/Follow up Plan Pt SBA to get OOB, walk to BR and groom at sink. Slow moving and increase time to complete.

## 2017-04-15 LAB
ANION GAP SERPL CALCULATED.3IONS-SCNC: 14 MMOL/L
BUN BLD-MCNC: 14 MG/DL (ref 8–23)
BUN/CREAT SERPL: 17.5 (ref 7–25)
CALCIUM SPEC-SCNC: 9.2 MG/DL (ref 8.6–10.5)
CHLORIDE SERPL-SCNC: 99 MMOL/L (ref 98–107)
CO2 SERPL-SCNC: 25 MMOL/L (ref 22–29)
CREAT BLD-MCNC: 0.8 MG/DL (ref 0.76–1.27)
DEPRECATED RDW RBC AUTO: 46.9 FL (ref 37–54)
ERYTHROCYTE [DISTWIDTH] IN BLOOD BY AUTOMATED COUNT: 13.6 % (ref 11.5–14.5)
GFR SERPL CREATININE-BSD FRML MDRD: 95 ML/MIN/1.73
GLUCOSE BLD-MCNC: 126 MG/DL (ref 65–99)
GLUCOSE BLDC GLUCOMTR-MCNC: 103 MG/DL (ref 70–130)
HCT VFR BLD AUTO: 43.3 % (ref 40.4–52.2)
HGB BLD-MCNC: 14.3 G/DL (ref 13.7–17.6)
INR PPP: 1.13 (ref 0.9–1.1)
MCH RBC QN AUTO: 31.6 PG (ref 27–32.7)
MCHC RBC AUTO-ENTMCNC: 33 G/DL (ref 32.6–36.4)
MCV RBC AUTO: 95.8 FL (ref 79.8–96.2)
PLATELET # BLD AUTO: 358 10*3/MM3 (ref 140–500)
PMV BLD AUTO: 10.7 FL (ref 6–12)
POTASSIUM BLD-SCNC: 3.5 MMOL/L (ref 3.5–5.2)
POTASSIUM BLD-SCNC: 4.3 MMOL/L (ref 3.5–5.2)
PROTHROMBIN TIME: 14.1 SECONDS (ref 11.7–14.2)
RBC # BLD AUTO: 4.52 10*6/MM3 (ref 4.6–6)
SODIUM BLD-SCNC: 138 MMOL/L (ref 136–145)
WBC NRBC COR # BLD: 9.96 10*3/MM3 (ref 4.5–10.7)

## 2017-04-15 PROCEDURE — 94799 UNLISTED PULMONARY SVC/PX: CPT

## 2017-04-15 PROCEDURE — 97110 THERAPEUTIC EXERCISES: CPT

## 2017-04-15 PROCEDURE — 85610 PROTHROMBIN TIME: CPT | Performed by: THORACIC SURGERY (CARDIOTHORACIC VASCULAR SURGERY)

## 2017-04-15 PROCEDURE — 93005 ELECTROCARDIOGRAM TRACING: CPT | Performed by: INTERNAL MEDICINE

## 2017-04-15 PROCEDURE — 82962 GLUCOSE BLOOD TEST: CPT

## 2017-04-15 PROCEDURE — 25010000002 ENOXAPARIN PER 10 MG: Performed by: THORACIC SURGERY (CARDIOTHORACIC VASCULAR SURGERY)

## 2017-04-15 PROCEDURE — 85027 COMPLETE CBC AUTOMATED: CPT | Performed by: THORACIC SURGERY (CARDIOTHORACIC VASCULAR SURGERY)

## 2017-04-15 PROCEDURE — 93010 ELECTROCARDIOGRAM REPORT: CPT | Performed by: INTERNAL MEDICINE

## 2017-04-15 PROCEDURE — 84132 ASSAY OF SERUM POTASSIUM: CPT | Performed by: THORACIC SURGERY (CARDIOTHORACIC VASCULAR SURGERY)

## 2017-04-15 PROCEDURE — 80048 BASIC METABOLIC PNL TOTAL CA: CPT | Performed by: THORACIC SURGERY (CARDIOTHORACIC VASCULAR SURGERY)

## 2017-04-15 PROCEDURE — 25010000002 AMIODARONE IN DEXTROSE 5% 360 MG/200ML SOLUTION: Performed by: INTERNAL MEDICINE

## 2017-04-15 PROCEDURE — 99024 POSTOP FOLLOW-UP VISIT: CPT | Performed by: NURSE PRACTITIONER

## 2017-04-15 PROCEDURE — 99232 SBSQ HOSP IP/OBS MODERATE 35: CPT | Performed by: INTERNAL MEDICINE

## 2017-04-15 RX ADMIN — POTASSIUM CHLORIDE 40 MEQ: 750 CAPSULE, EXTENDED RELEASE ORAL at 11:50

## 2017-04-15 RX ADMIN — AMIODARONE HYDROCHLORIDE 0.5 MG/MIN: 1.8 INJECTION, SOLUTION INTRAVENOUS at 16:45

## 2017-04-15 RX ADMIN — ASPIRIN 81 MG: 81 TABLET ORAL at 08:01

## 2017-04-15 RX ADMIN — WARFARIN SODIUM 2 MG: 2 TABLET ORAL at 17:17

## 2017-04-15 RX ADMIN — ATORVASTATIN CALCIUM 40 MG: 40 TABLET, FILM COATED ORAL at 20:42

## 2017-04-15 RX ADMIN — METOPROLOL TARTRATE 50 MG: 50 TABLET ORAL at 08:01

## 2017-04-15 RX ADMIN — MUPIROCIN CALCIUM: 20 OINTMENT TOPICAL at 11:50

## 2017-04-15 RX ADMIN — HYDROCODONE BITARTRATE AND ACETAMINOPHEN 1 TABLET: 7.5; 325 TABLET ORAL at 08:00

## 2017-04-15 RX ADMIN — CYCLOBENZAPRINE HYDROCHLORIDE 10 MG: 10 TABLET, FILM COATED ORAL at 16:02

## 2017-04-15 RX ADMIN — POTASSIUM CHLORIDE 40 MEQ: 750 CAPSULE, EXTENDED RELEASE ORAL at 08:01

## 2017-04-15 RX ADMIN — HYDROCODONE BITARTRATE AND ACETAMINOPHEN 1 TABLET: 7.5; 325 TABLET ORAL at 16:45

## 2017-04-15 RX ADMIN — PANTOPRAZOLE SODIUM 40 MG: 40 TABLET, DELAYED RELEASE ORAL at 06:28

## 2017-04-15 RX ADMIN — DOCUSATE SODIUM,SENNOSIDES 2 TABLET: 50; 8.6 TABLET, FILM COATED ORAL at 20:42

## 2017-04-15 RX ADMIN — METOPROLOL TARTRATE 50 MG: 50 TABLET ORAL at 20:42

## 2017-04-15 RX ADMIN — ENOXAPARIN SODIUM 40 MG: 40 INJECTION SUBCUTANEOUS at 20:42

## 2017-04-15 NOTE — PROGRESS NOTES
" LOS: 8 days   Patient Care Team:  Damian Chen MD as PCP - General (Family Medicine)  Damian Chen MD as PCP - Claims Attributed - PLEASE DO NOT REMOVE  Temo Cortez MD as Surgeon (Cardiothoracic Surgery)    Chief Complaint: f/u post op    Subjective:  Symptoms:  No shortness of breath or chest pain.    Diet:  Dietary issues: dislikes thickened liquids.  No nausea or vomiting.    Activity level: Returning to normal.    Pain:  He reports no pain.          Vital Signs  Temp:  [97.6 °F (36.4 °C)-98.2 °F (36.8 °C)] 97.6 °F (36.4 °C)  Heart Rate:  [] 140  Resp:  [14-18] 16  BP: ()/(60-84) 111/83  Body mass index is 27.61 kg/(m^2).    Intake/Output Summary (Last 24 hours) at 04/15/17 0946  Last data filed at 04/15/17 0141   Gross per 24 hour   Intake              360 ml   Output             1075 ml   Net             -715 ml              Last 3 weights    04/13/17  0705 04/14/17  0656 04/15/17  0505   Weight: 185 lb 3.2 oz (84 kg) 182 lb 3.2 oz (82.6 kg) 181 lb 9.6 oz (82.4 kg)         Objective:  General Appearance:  Comfortable and in no acute distress.    Vital signs: (most recent): Blood pressure 111/83, pulse (!) 140, temperature 97.6 °F (36.4 °C), temperature source Oral, resp. rate 16, height 68\" (172.7 cm), weight 181 lb 9.6 oz (82.4 kg), SpO2 93 %.    Lungs:  Normal respiratory rate and normal effort.  Breath sounds clear to auscultation.    Heart: Tachycardia.  Irregular rhythm.  No murmur, gallop or friction rub. (A.flutter rate 130)  Extremities: There is no local swelling or dependent edema.              Results Review:        WBC WBC   Date Value Ref Range Status   04/15/2017 9.96 4.50 - 10.70 10*3/mm3 Final   04/14/2017 10.55 4.50 - 10.70 10*3/mm3 Final   04/13/2017 12.83 (H) 4.50 - 10.70 10*3/mm3 Final      HGB Hemoglobin   Date Value Ref Range Status   04/15/2017 14.3 13.7 - 17.6 g/dL Final   04/14/2017 14.7 13.7 - 17.6 g/dL Final   04/13/2017 13.5 (L) 13.7 - 17.6 g/dL Final      HCT " Hematocrit   Date Value Ref Range Status   04/15/2017 43.3 40.4 - 52.2 % Final   04/14/2017 45.8 40.4 - 52.2 % Final   04/13/2017 42.2 40.4 - 52.2 % Final      Platelets Platelets   Date Value Ref Range Status   04/15/2017 358 140 - 500 10*3/mm3 Final   04/14/2017 313 140 - 500 10*3/mm3 Final   04/13/2017 358 140 - 500 10*3/mm3 Final        PT/INR:    Protime   Date Value Ref Range Status   04/15/2017 14.1 11.7 - 14.2 Seconds Final   04/14/2017 14.1 11.7 - 14.2 Seconds Final   /  INR   Date Value Ref Range Status   04/15/2017 1.13 (H) 0.90 - 1.10 Final   04/14/2017 1.13 (H) 0.90 - 1.10 Final       Sodium Sodium   Date Value Ref Range Status   04/15/2017 138 136 - 145 mmol/L Final   04/14/2017 135 (L) 136 - 145 mmol/L Final   04/13/2017 139 136 - 145 mmol/L Final      Potassium Potassium   Date Value Ref Range Status   04/15/2017 3.5 3.5 - 5.2 mmol/L Final   04/14/2017 4.2 3.5 - 5.2 mmol/L Final   04/13/2017 4.6 3.5 - 5.2 mmol/L Final      Chloride Chloride   Date Value Ref Range Status   04/15/2017 99 98 - 107 mmol/L Final   04/14/2017 98 98 - 107 mmol/L Final   04/13/2017 101 98 - 107 mmol/L Final      Bicarbonate CO2   Date Value Ref Range Status   04/15/2017 25.0 22.0 - 29.0 mmol/L Final   04/14/2017 23.4 22.0 - 29.0 mmol/L Final   04/13/2017 25.7 22.0 - 29.0 mmol/L Final      BUN BUN   Date Value Ref Range Status   04/15/2017 14 8 - 23 mg/dL Final   04/14/2017 10 8 - 23 mg/dL Final   04/13/2017 11 8 - 23 mg/dL Final      Creatinine Creatinine   Date Value Ref Range Status   04/15/2017 0.80 0.76 - 1.27 mg/dL Final   04/14/2017 0.74 (L) 0.76 - 1.27 mg/dL Final   04/13/2017 0.83 0.76 - 1.27 mg/dL Final      Calcium Calcium   Date Value Ref Range Status   04/15/2017 9.2 8.6 - 10.5 mg/dL Final   04/14/2017 9.2 8.6 - 10.5 mg/dL Final   04/13/2017 8.7 8.6 - 10.5 mg/dL Final      Magnesium No results found for: MG         aspirin 81 mg Oral Daily   atorvastatin 40 mg Oral Nightly   digoxin 250 mcg Oral Daily    enoxaparin 40 mg Subcutaneous Nightly   metoprolol tartrate 50 mg Oral Q12H   mupirocin  Each Nare Daily   pantoprazole 40 mg Oral Q AM   sennosides-docusate sodium 2 tablet Oral Nightly   warfarin 2 mg Oral Daily       amiodarone 0.5 mg/min Last Rate: 0.5 mg/min (04/14/17 1807)           Patient Active Problem List   Diagnosis Code   • Anxiety F41.9   • Arthritis M19.90   • CAD (coronary artery disease) I25.10   • HLD (hyperlipidemia) E78.5   • Benign essential hypertension I10   • DDD (degenerative disc disease), lumbosacral M51.37   • Stroke I63.9   • Screening for prostate cancer Z12.5   • Hypertension I10   • Gastroesophageal reflux disease K21.9   • Hyperlipidemia E78.5   • New onset atrial flutter I48.92       Assessment & Plan    CAD S/P CABG X3 WITH LIMA-----on asa/bb/statin  PREOP NEW ONSET A.FLUTTER-----rate still 130, cardiology managing, possible ablation evaluation on Monday  HTN  HL  QUESTIONABLE BRIAN----evaluate as outpatient  HX CVA S/P CEA  GERD  REMOTE KIDNEY STONES    Failed swallow, on thickened liquids, speech to reeval today.  Increase activity as tolerated with heart rate, continue pulmonary toilet.      MY Sutton  04/15/17  9:46 AM

## 2017-04-15 NOTE — PROGRESS NOTES
"Madhu Santoro  1945 71 y.o.  [id number]      Patient Care Team:  Damian Chen MD as PCP - General (Family Medicine)  Damian Chen MD as PCP - Claims Attributed - PLEASE DO NOT REMOVE  Temo Cortez MD as Surgeon (Cardiothoracic Surgery)    CC: Status post CABG, atrial flutter, normal LV function    Interval History: Little bit of discomfort, still in flutter      Objective   Vital Signs  Temp:  [97.6 °F (36.4 °C)-98.2 °F (36.8 °C)] 97.6 °F (36.4 °C)  Heart Rate:  [] 140  Resp:  [14-18] 16  BP: ()/(60-84) 111/83    Intake/Output Summary (Last 24 hours) at 04/15/17 1102  Last data filed at 04/15/17 0141   Gross per 24 hour   Intake              360 ml   Output             1075 ml   Net             -715 ml     Flowsheet Rows         First Filed Value    Admission Height  68\" (172.7 cm) Documented at 04/07/2017 0935    Admission Weight  185 lb (83.9 kg) Documented at 04/07/2017 0935          Physical Exam:   General Appearance:    Alert,oriented, in no acute distress   Lungs:     Clear to auscultation,BS are equal    Heart:    Normal S1 and S2, RiRR without murmur, gallop or rub   HEENT:    Sclera are clear, no JVD or adenopathy   Abdomen:     Normal bowel sounds, soft non-tender, non-distended, no HSM   Extremities:   Moves all extremities well, no edema, no cyanosis, no             Redness, no rash     Medication Review:        aspirin 81 mg Oral Daily   atorvastatin 40 mg Oral Nightly   digoxin 250 mcg Oral Daily   enoxaparin 40 mg Subcutaneous Nightly   metoprolol tartrate 50 mg Oral Q12H   mupirocin  Each Nare Daily   pantoprazole 40 mg Oral Q AM   sennosides-docusate sodium 2 tablet Oral Nightly   warfarin 2 mg Oral Daily       amiodarone 0.5 mg/min Last Rate: 0.5 mg/min (04/14/17 1807)         I reviewed the patient's new clinical results.  I personally viewed and interpreted the patient's EKG/Telemetry data    Assessment/Plan  Active Hospital Problems (** Indicates Principal Problem) "    Diagnosis Date Noted   • New onset atrial flutter [I48.92] 04/07/2017      Resolved Hospital Problems    Diagnosis Date Noted Date Resolved   No resolved problems to display.       Early postop back in atrial flutter with RVR is actually better after receiving his metoprolol, this is typical flutter would be a great ablation candidate but has not been anticoagulated if we continue to have difficulty controlling it then I would proceed with a AR and ablated continue with his anticoagulation    Lisandro Emery MD  04/15/17  11:02 AM

## 2017-04-15 NOTE — PLAN OF CARE
Problem: Patient Care Overview (Adult)  Goal: Plan of Care Review  Outcome: Ongoing (interventions implemented as appropriate)    04/15/17 9512   Coping/Psychosocial Response Interventions   Plan Of Care Reviewed With patient;spouse   Patient Care Overview   Progress no change   Outcome Evaluation   Outcome Summary/Follow up Plan Patient remains in aflutter and on amio gtt. possible ablation on Monday.        Goal: Adult Individualization and Mutuality  Outcome: Ongoing (interventions implemented as appropriate)  Goal: Discharge Needs Assessment  Outcome: Ongoing (interventions implemented as appropriate)    Problem: Cardiac Surgery (Adult)  Goal: Signs and Symptoms of Listed Potential Problems Will be Absent or Manageable (Cardiac Surgery)  Outcome: Ongoing (interventions implemented as appropriate)    Problem: Fall Risk (Adult)  Goal: Absence of Falls  Outcome: Ongoing (interventions implemented as appropriate)    Problem: Skin Integrity Impairment, Risk/Actual (Adult)  Goal: Skin Integrity/Wound Healing  Outcome: Ongoing (interventions implemented as appropriate)

## 2017-04-15 NOTE — PROGRESS NOTES
Acute Care - Physical Therapy Treatment Note  [facility]     Patient Name: Madhu Santoro  : 1945  MRN: [id number]  Today's Date: 4/15/2017  Onset of Illness/Injury or Date of Surgery Date: 17          Admit Date: 2017    Visit Dx:    ICD-10-CM ICD-9-CM   1. New onset atrial flutter I48.92 427.32   2. Coronary artery disease involving native coronary artery of native heart without angina pectoris I25.10 414.01   3. Generalized weakness R53.1 780.79     Patient Active Problem List   Diagnosis   • Anxiety   • Arthritis   • CAD (coronary artery disease)   • HLD (hyperlipidemia)   • Benign essential hypertension   • DDD (degenerative disc disease), lumbosacral   • Stroke   • Screening for prostate cancer   • Hypertension   • Gastroesophageal reflux disease   • Hyperlipidemia   • New onset atrial flutter               Adult Rehabilitation Note       04/15/17 0922 17 1356 17 0930    Rehab Assessment/Intervention    Discipline physical therapy assistant  -MM occupational therapist  -LE physical therapist  -PC,CH,PC2    Document Type therapy note (daily note)  -MM therapy note (daily note)  -LE therapy note (daily note)  -PC,CH,PC2    Subjective Information agree to therapy  -MM agree to therapy  -LE agree to therapy;no complaints  -PC,CH,PC2    Patient Effort, Rehab Treatment good  -MM good  -LE adequate  -PC,CH,PC2    Symptoms Noted During/After Treatment none  -MM none  -LE none  -PC,CH,PC2    Precautions/Limitations cardiac precautions;fall precautions  -MM cardiac precautions;fall precautions;oxygen therapy device and L/min   heart hugger  -LE cardiac precautions;fall precautions;oxygen therapy device and L/min;sternal precautions   1.5L/min  -PC,CH,PC2    Recorded by [MM] Yandy Brower, PTA [LE] Alissa Upton, OTR [PC,CH,PC2] Naomi Tucker, PT (r) Miguel Stephenson, PT Student (t) Naomi Tucker, PT (c)    Vital Signs    Pre Systolic BP Rehab  115  -  -PC,CH,PC2    Pre Treatment  Diastolic BP  84  -LE 78  -PC,CH,PC2    Pretreatment Heart Rate (beats/min)  130  -  -PC,CH,PC2    Posttreatment Heart Rate (beats/min)   135  -PC,CH,PC2    Pre SpO2 (%)  94  -LE 92  -PC,CH,PC2    O2 Delivery Pre Treatment  supplemental O2  -LE supplemental O2  -PC,CH,PC2    O2 Delivery Intra Treatment  room air  -LE     Post SpO2 (%)  96  -LE 95  -PC,CH,PC2    O2 Delivery Post Treatment  supplemental O2  -LE supplemental O2  -PC,CH,PC2    Pre Patient Position  Supine  -LE     Intra Patient Position  Standing  -LE     Post Patient Position  Supine  -LE     Recorded by  [LE] Alissa Upton OTR [PC,CH,PC2] Naomi Tucker, PT (r) Miguel Stephenson PT Student (t) Naomi Tucker, PT (c)    Pain Assessment    Pain Assessment No/denies pain  -MM No/denies pain  -LE No/denies pain  -PC,CH,PC2    Recorded by [MM] Yandy Brower, PTA [LE] Alissa Upton OTR [PC,CH,PC2] Naomi Tucker, PT (r) Miguel Stephenson, PT Student (t) Naomi Tucker, PT (c)    Cognitive Assessment/Intervention    Current Cognitive/Communication Assessment   functional  -PC,CH,PC2    Orientation Status   oriented x 4  -PC,CH,PC2    Follows Commands/Answers Questions   100% of the time;able to follow single-step instructions  -PC,CH,PC2    Personal Safety   WNL/WFL  -PC,CH,PC2    Personal Safety Interventions   fall prevention program maintained;nonskid shoes/slippers when out of bed  -PC,CH,PC2    Recorded by   [PC,CH,PC2] Naomi Tucker, PT (r) Miguel Stephenson PT Student (t) Naomi Tucker, PT (c)    Bed Mobility, Assessment/Treatment    Bed Mob, Supine to Sit, Ward not tested  -MM supervision required;verbal cues required  -LE not tested  -PC,CH,PC2    Bed Mob, Sit to Supine, Ward not tested  -MM supervision required;verbal cues required  -LE not tested  -PC,CH,PC2    Bed Mobility, Comment up in chair  -MM  up in chair  -PC,CH,PC2    Recorded by [MM] Yandy Brower, PTA [LE] Alissa Elder, OTR [PC,CH,PC2] Naomi Tucker, PT (r) Miguel  Sachin, PT Student (t) Naomi Tucker, PT (c)    Transfer Assessment/Treatment    Transfers, Sit-Stand [place] stand by assist  -MM stand by assist  -LE stand by assist  -PC,CH,PC2    Transfers, Stand-Sit [place] stand by assist  -MM stand by assist  -LE stand by assist  -PC,CH,PC2    Recorded by [MM] Yandy Brower PTA [LE] Alissa Upton OTR [PC,CH,PC2] Naomi Tucker, PT (r) Miguel Stephenson PT Student (t) Naomi Tucker PT (c)    Gait Assessment/Treatment    Gait, [place]ie Level stand by assist  -MM  minimum assist (75% patient effort)  -PC,CH,PC2    Gait, Distance (Feet) 360  -MM  150  -PC,CH,PC2    Gait, Safety Issues   supplemental O2  -PC,CH,PC2    Gait, Impairments   strength decreased  -PC,CH,PC2    Recorded by [MM] Yandy Brower PTA  [PC,CH,PC2] Naomi Tucker, PT (r) Miguel Stephenson, PT Student (t) Naomi Tucker PT (c)    Functional Mobility    Functional Mobility- Ind. Level  supervision required  -LE     Functional Mobility-Distance (Feet)  15  -LE     Recorded by  [LE] JOSE LUIS Martinez     Grooming Assessment/Training    Grooming Assess/Train, Position  sink side;standing  -LE     Grooming Assess/Train, Indepen Level  set up required  -LE     Recorded by  [LE] JOSE LUIS Martinez     Balance Skills Training    Sitting-Level of Assistance  Independent  -LE     Standing-Level of Assistance  Distant supervision  -LE     Recorded by  [LE] JOSE LUIS Martinez     Therapy Exercises    Exercise Protocols   --   level IV cardiac protocol 10 reps each  -PC,CH,PC2    Recorded by   [PC,CH,PC2] Naomi Tucker, PT (r) Miguel Stephenson, PT Student (t) Naomi Tucker, PT (c)    Positioning and Restraints    Pre-Treatment Position sitting in chair/recliner  -MM in bed  -LE sitting in chair/recliner  -PC,CH,PC2    Post Treatment Position chair  -MM bed  -LE chair  -PC,CH,PC2    In Bed  notified nsg;call light within reach;encouraged to call for assist;heels elevated  -LE     In Chair reclined;call light  within reach;encouraged to call for assist  -MM  sitting;call light within reach;encouraged to call for assist;with family/caregiver  -PC,CH,PC2    Recorded by [MM] Yandy Brower, PTA [LE] Alissa Upton, OTR [PC,CH,PC2] Naomi Tucker PT (r) Miguel Stephenson PT Student (t) Naomi Tucker PT (c)      04/13/17 1008          Rehab Assessment/Intervention    Discipline physical therapist  -PC,CH,PC2      Document Type therapy note (daily note)  -PC,CH,PC2      Subjective Information agree to therapy;complains of;pain  -PC,CH,PC2      Patient Effort, Rehab Treatment adequate  -PC,CH,PC2      Symptoms Noted During/After Treatment shortness of breath  -PC,CH,PC2      Precautions/Limitations cardiac precautions;fall precautions;oxygen therapy device and L/min;sternal precautions   2L/min  -PC,CH,PC2      Recorded by [PC,CH,PC2] Naomi Tucker PT (r) Miguel Stephenson PT Student (t) Naomi Tucker PT (c)      Vital Signs    Pre Systolic BP Rehab 129  -PC,CH,PC2      Pre Treatment Diastolic BP 77  -PC,CH,PC2      Pretreatment Heart Rate (beats/min) 113  -PC,CH,PC2      Posttreatment Heart Rate (beats/min) 113  -PC,CH,PC2      Pre SpO2 (%) 93  -PC,CH,PC2      O2 Delivery Pre Treatment supplemental O2  -PC,CH,PC2      Post SpO2 (%) 94  -PC,CH,PC2      O2 Delivery Post Treatment supplemental O2  -PC,CH,PC2      Recorded by [PC,CH,PC2] Naomi Tucker PT (r) Miguel Stephenson PT Student (t) Naomi Tucker PT (c)      Pain Assessment    Pain Assessment 0-10  -PC,CH,PC2      Pain Score 5  -PC,CH,PC2      Pain Location Back  -PC,CH,PC2      Pain Intervention(s) Repositioned  -PC,CH,PC2      Recorded by [PC,CH,PC2] Naomi Tucker PT (r) Miguel Stephenson PT Student (t) Naomi Tucker PT (c)      Cognitive Assessment/Intervention    Current Cognitive/Communication Assessment functional  -PC,CH,PC2      Orientation Status oriented x 4  -PC,CH,PC2      Follows Commands/Answers Questions 100% of the time;able to follow single-step  instructions  -PC,CH,PC2      Personal Safety WNL/WFL  -PC,CH,PC2      Personal Safety Interventions fall prevention program maintained;nonskid shoes/slippers when out of bed  -PC,CH,PC2      Recorded by [PC,CH,PC2] Naomi Tucker PT (r) Miguel Stephenson PT Student (t) Naomi Tucker PT (c)      Bed Mobility, Assessment/Treatment    Bed Mob, Supine to Sit, [place] not tested  -PC,CH,PC2      Bed Mob, Sit to Supine, [place] not tested  -PC,CH,PC2      Bed Mobility, Comment up in chair  -PC,CH,PC2      Recorded by [PC,CH,PC2] Naomi Tucker PT (r) Miguel Stephenson PT Student (t) Naomi Tucker PT (c)      Transfer Assessment/Treatment    Transfers, Sit-Stand [place] moderate assist (50% patient effort)  -PC,CH,PC2      Transfers, Stand-Sit [place] minimum assist (75% patient effort);2 person assist required  -PC,CH,PC2      Transfer, Impairments strength decreased  -PC,CH,PC2      Recorded by [PC,CH,PC2] Naomi Tucker PT (r) Miguel Stephenson PT Student (t) Naomi Tucker PT (c)      Gait Assessment/Treatment    Gait, [place] Level minimum assist (75% patient effort);2 person assist required  -PC,CH,PC2      Gait, Distance (Feet) 70  -PC,CH,PC2      Gait, Gait Deviations step length decreased;jemal decreased  -PC,CH,PC2      Gait, Safety Issues supplemental O2;step length decreased  -PC,CH,PC2      Gait, Impairments strength decreased  -PC,CH,PC2      Recorded by [PC,CH,PC2] Naomi Tucker PT (r) Miguel Stephenson PT Student (t) Naomi Tucker PT (c)      Therapy Exercises    Exercise Protocols --   level III cardiac protocol 5 reps each  -PC,CH,PC2      Recorded by [PC,CH,PC2] Naomi Tucker PT (r) Miguel Stephenson PT Student (t) Naomi Tucker PT (c)      Positioning and Restraints    Pre-Treatment Position sitting in chair/recliner  -PC,CH,PC2      In Chair reclined;call light within reach;encouraged to call for assist;with family/caregiver  -PC,CH,PC2      Recorded by [PC,CH,PC2]  Naomi Tucker, PT (r) Miguel Stephenson, PT Student (t) Naomi Tucker, PT (c)        User Key  (r) = Recorded By, (t) = Taken By, (c) = Cosigned By    Initials Name Effective Dates    VELMA Alissa Upton, OTR 04/13/15 -     PC Naomi Tucker, PT 12/01/15 -     MM Yandy Brower, PTA 02/18/16 -     CH Miguel Stephenson, PT Student 01/31/17 -                 IP PT Goals       04/12/17 0858          Cardiopulmonary PT LTG    Cardiopulmonary PT LTG, Date Established 04/12/17  -EM      Cardiopulmonary PT LTG, Time to Achieve 1 wk  -EM      Cardiopulmonary PT LTG, Level Level V  -EM        User Key  (r) = Recorded By, (t) = Taken By, (c) = Cosigned By    Initials Name Provider Type    EM Susan Hurtado, PT Physical Therapist          Physical Therapy Education     Title: PT OT SLP Therapies (Active)     Topic: Physical Therapy (Active)     Point: Mobility training (Done)    Learning Progress Summary    Learner Readiness Method Response Comment Documented by Status   Patient Acceptance ANDREA RAMÍREZ DU   04/14/17 0936 Done    Acceptance E NR   04/13/17 1013 Active    Acceptance E NR   04/12/17 0857 Active   Family Acceptance ANDREA RAMÍREZ DU   04/14/17 0936 Done    Acceptance E NR   04/13/17 1013 Active               Point: Home exercise program (Active)    Learning Progress Summary    Learner Readiness Method Response Comment Documented by Status   Patient Acceptance E NR   04/12/17 0857 Active               Point: Body mechanics (Done)    Learning Progress Summary    Learner Readiness Method Response Comment Documented by Status   Patient Acceptance E [facility] 04/15/17 0924 Done                      User Key     Initials Effective Dates Name Provider Type Discipline    EM 12/01/15 -  Susan Hurtado, PT Physical Therapist PT    MM 02/18/16 -  Yandy Brower, PTA Physical Therapy Assistant PT     01/31/17 -  Miguel Stephenson, PT Student PT Student PT                    PT Recommendation and Plan  Anticipated Discharge  Disposition: home with assist  Planned Therapy Interventions: bed mobility training, gait training, home exercise program  PT Frequency: daily  Plan of Care Review  Plan Of Care Reviewed With: patient  Progress: improving  Outcome Summary/Follow up Plan: pt with increased ambulation distance this date without the use of an AD.          Outcome Measures       04/15/17 0900 04/14/17 1359 04/14/17 1300    How much help from another person do you currently need...    Turning from your back to your side while in flat bed without using bedrails? 3  -MM      Moving from lying on back to sitting on the side of a flat bed without bedrails? 3  -MM      Moving to and from a bed to a chair (including a wheelchair)? 3  -MM      Standing up from a chair using your arms (e.g., wheelchair, bedside chair)? 4  -MM      Climbing 3-5 steps with a railing? 3  -MM      To walk in hospital room? 4  -MM      AM-PAC 6 Clicks Score 20  -MM      How much help from another is currently needed...    Putting on and taking off regular lower body clothing?  3  -LE     Bathing (including washing, rinsing, and drying)  3  -LE     Toileting (which includes using toilet bed pan or urinal)  3  -LE     Putting on and taking off regular upper body clothing  3  -LE     Taking care of personal grooming (such as brushing teeth)  3  -LE     Eating meals  4  -LE     Score  19  -LE     Functional Assessment    Outcome Measure Options   AM-PAC 6 Clicks Daily Activity (OT)  -LE      04/14/17 0900 04/13/17 1059 04/13/17 1000    How much help from another person do you currently need...    Turning from your back to your side while in flat bed without using bedrails? 3  -PC (r) CH (t) PC (c)  3  -EM (r) CH (t) EM (c)    Moving from lying on back to sitting on the side of a flat bed without bedrails? 3  -PC (r) CH (t) PC (c)  2  -EM (r) CH (t) EM (c)    Moving to and from a bed to a chair (including a wheelchair)? 3  -PC (r) CH (t) PC (c)  3  -EM (r) CH (t) EM (c)     Standing up from a chair using your arms (e.g., wheelchair, bedside chair)? 3  -PC (r) CH (t) PC (c)  3  -EM (r) CH (t) EM (c)    Climbing 3-5 steps with a railing? 3  -PC (r) CH (t) PC (c)  2  -EM (r) CH (t) EM (c)    To walk in hospital room? 3  -PC (r) CH (t) PC (c)  3  -EM (r) CH (t) EM (c)    AM-PAC 6 Clicks Score 18  -PC (r) CH (t)  16  -EM (r) CH (t)    How much help from another is currently needed...    Putting on and taking off regular lower body clothing?  3  -MR     Bathing (including washing, rinsing, and drying)  3  -MR     Toileting (which includes using toilet bed pan or urinal)  3  -MR     Putting on and taking off regular upper body clothing  3  -MR     Taking care of personal grooming (such as brushing teeth)  3  -MR     Eating meals  4  -MR     Score  19  -MR     Functional Assessment    Outcome Measure Options AM-PAC 6 Clicks Daily Activity (OT)  -PC (r) CH (t) PC (c) AM-PAC 6 Clicks Daily Activity (OT)  -MR AM-PAC 6 Clicks Basic Mobility (PT)  -EM (r) CH (t) EM (c)      User Key  (r) = Recorded By, (t) = Taken By, (c) = Cosigned By    Initials Name Provider Type    VELMA Upton, OTR Occupational Therapist    PC Naomi Tucker, PT Physical Therapist    EM Susan Hurtado, PT Physical Therapist    MM Yandy Brower, FREDY Physical Therapy Assistant    MR Deedee Mancia, OT Occupational Therapist    CH Miguel Stephenson, PT Student PT Student           Time Calculation:         PT Charges       04/15/17 0925          Time Calculation    Start Time 0848  -MM      Stop Time 0858  -MM      Time Calculation (min) 10 min  -MM      PT Received On 04/15/17  -MM      PT - Next Appointment 04/16/17  -MM        User Key  (r) = Recorded By, (t) = Taken By, (c) = Cosigned By    Initials Name Provider Type    MARY ANN Brower, FREDY Physical Therapy Assistant          Therapy Charges for Today     Code Description Service Date Service Provider Modifiers Qty    50760101457 HC PT THER PROC EA 15 MIN  4/15/2017 aYndy Brower, PTA GP 1          PT G-Codes  Outcome Measure Options: AM-PAC 6 Clicks Daily Activity (OT)    Yandy Brower, PTA  4/15/2017

## 2017-04-15 NOTE — PLAN OF CARE
Problem: Patient Care Overview (Adult)  Goal: Plan of Care Review  Outcome: Ongoing (interventions implemented as appropriate)    04/15/17 0233   Coping/Psychosocial Response Interventions   Plan Of Care Reviewed With patient   Patient Care Overview   Progress no change   Outcome Evaluation   Outcome Summary/Follow up Plan VSS, pt complaining of some pain in back but not wanting to take pain medicine yet. Still on amio gtt, HR still aflutter 120-130's. Will continue to monitor closely.        Goal: Adult Individualization and Mutuality  Outcome: Ongoing (interventions implemented as appropriate)  Goal: Discharge Needs Assessment  Outcome: Ongoing (interventions implemented as appropriate)    Problem: Cardiac Surgery (Adult)  Goal: Signs and Symptoms of Listed Potential Problems Will be Absent or Manageable (Cardiac Surgery)  Outcome: Ongoing (interventions implemented as appropriate)    04/15/17 0233   Cardiac Surgery   Problems Assessed (Cardiac Surgery) all   Problems Present (Cardiac Surgery) pain         04/15/17 0233   Cardiac Surgery   Problems Assessed (Cardiac Surgery) all   Problems Present (Cardiac Surgery) pain;dysrhythmia/arrhythmia         Problem: Fall Risk (Adult)  Goal: Absence of Falls  Outcome: Ongoing (interventions implemented as appropriate)    04/15/17 0233   Fall Risk (Adult)   Absence of Falls making progress toward outcome         Problem: Skin Integrity Impairment, Risk/Actual (Adult)  Goal: Skin Integrity/Wound Healing  Outcome: Ongoing (interventions implemented as appropriate)    04/15/17 0233   Skin Integrity Impairment, Risk/Actual (Adult)   Skin Integrity/Wound Healing making progress toward outcome

## 2017-04-16 LAB
ANION GAP SERPL CALCULATED.3IONS-SCNC: 14.3 MMOL/L
BUN BLD-MCNC: 15 MG/DL (ref 8–23)
BUN/CREAT SERPL: 18.3 (ref 7–25)
CALCIUM SPEC-SCNC: 8.9 MG/DL (ref 8.6–10.5)
CHLORIDE SERPL-SCNC: 102 MMOL/L (ref 98–107)
CO2 SERPL-SCNC: 23.7 MMOL/L (ref 22–29)
CREAT BLD-MCNC: 0.82 MG/DL (ref 0.76–1.27)
GFR SERPL CREATININE-BSD FRML MDRD: 92 ML/MIN/1.73
GLUCOSE BLD-MCNC: 108 MG/DL (ref 65–99)
INR PPP: 1.15 (ref 0.9–1.1)
POTASSIUM BLD-SCNC: 3.9 MMOL/L (ref 3.5–5.2)
PROTHROMBIN TIME: 14.3 SECONDS (ref 11.7–14.2)
SODIUM BLD-SCNC: 140 MMOL/L (ref 136–145)

## 2017-04-16 PROCEDURE — 99024 POSTOP FOLLOW-UP VISIT: CPT | Performed by: NURSE PRACTITIONER

## 2017-04-16 PROCEDURE — 25010000002 AMIODARONE IN DEXTROSE 5% 360 MG/200ML SOLUTION: Performed by: INTERNAL MEDICINE

## 2017-04-16 PROCEDURE — 25010000002 MAGNESIUM SULFATE IN D5W 1G/100ML (PREMIX) 10-5 MG/ML-% SOLUTION: Performed by: NURSE PRACTITIONER

## 2017-04-16 PROCEDURE — 93010 ELECTROCARDIOGRAM REPORT: CPT | Performed by: INTERNAL MEDICINE

## 2017-04-16 PROCEDURE — 80048 BASIC METABOLIC PNL TOTAL CA: CPT | Performed by: THORACIC SURGERY (CARDIOTHORACIC VASCULAR SURGERY)

## 2017-04-16 PROCEDURE — 93005 ELECTROCARDIOGRAM TRACING: CPT | Performed by: INTERNAL MEDICINE

## 2017-04-16 PROCEDURE — 85610 PROTHROMBIN TIME: CPT | Performed by: THORACIC SURGERY (CARDIOTHORACIC VASCULAR SURGERY)

## 2017-04-16 PROCEDURE — 99232 SBSQ HOSP IP/OBS MODERATE 35: CPT | Performed by: INTERNAL MEDICINE

## 2017-04-16 PROCEDURE — 25010000002 ENOXAPARIN PER 10 MG: Performed by: THORACIC SURGERY (CARDIOTHORACIC VASCULAR SURGERY)

## 2017-04-16 PROCEDURE — 97110 THERAPEUTIC EXERCISES: CPT

## 2017-04-16 RX ORDER — METOPROLOL TARTRATE 50 MG/1
50 TABLET, FILM COATED ORAL EVERY 8 HOURS
Status: DISCONTINUED | OUTPATIENT
Start: 2017-04-16 | End: 2017-04-18

## 2017-04-16 RX ORDER — METOPROLOL TARTRATE 50 MG/1
50 TABLET, FILM COATED ORAL 3 TIMES DAILY
Status: DISCONTINUED | OUTPATIENT
Start: 2017-04-16 | End: 2017-04-16 | Stop reason: SDUPTHER

## 2017-04-16 RX ORDER — BISACODYL 10 MG
10 SUPPOSITORY, RECTAL RECTAL ONCE
Status: DISCONTINUED | OUTPATIENT
Start: 2017-04-16 | End: 2017-04-19 | Stop reason: HOSPADM

## 2017-04-16 RX ORDER — WARFARIN SODIUM 7.5 MG/1
7.5 TABLET ORAL
Status: DISCONTINUED | OUTPATIENT
Start: 2017-04-16 | End: 2017-04-18

## 2017-04-16 RX ADMIN — AMIODARONE HYDROCHLORIDE 0.5 MG/MIN: 1.8 INJECTION, SOLUTION INTRAVENOUS at 18:04

## 2017-04-16 RX ADMIN — HYDROCODONE BITARTRATE AND ACETAMINOPHEN 1 TABLET: 7.5; 325 TABLET ORAL at 12:06

## 2017-04-16 RX ADMIN — AMIODARONE HYDROCHLORIDE 0.5 MG/MIN: 1.8 INJECTION, SOLUTION INTRAVENOUS at 04:57

## 2017-04-16 RX ADMIN — ATORVASTATIN CALCIUM 40 MG: 40 TABLET, FILM COATED ORAL at 20:06

## 2017-04-16 RX ADMIN — WARFARIN SODIUM 7.5 MG: 7.5 TABLET ORAL at 18:04

## 2017-04-16 RX ADMIN — ASPIRIN 81 MG: 81 TABLET ORAL at 08:20

## 2017-04-16 RX ADMIN — METOPROLOL TARTRATE 50 MG: 50 TABLET ORAL at 20:06

## 2017-04-16 RX ADMIN — METOPROLOL TARTRATE 50 MG: 50 TABLET ORAL at 08:20

## 2017-04-16 RX ADMIN — HYDROCODONE BITARTRATE AND ACETAMINOPHEN 1 TABLET: 7.5; 325 TABLET ORAL at 02:38

## 2017-04-16 RX ADMIN — HYDROCODONE BITARTRATE AND ACETAMINOPHEN 1 TABLET: 7.5; 325 TABLET ORAL at 16:04

## 2017-04-16 RX ADMIN — MAGNESIUM SULFATE HEPTAHYDRATE 1 G: 1 INJECTION, SOLUTION INTRAVENOUS at 15:02

## 2017-04-16 RX ADMIN — MUPIROCIN CALCIUM: 20 OINTMENT TOPICAL at 08:20

## 2017-04-16 RX ADMIN — ENOXAPARIN SODIUM 40 MG: 40 INJECTION SUBCUTANEOUS at 20:07

## 2017-04-16 RX ADMIN — MAGNESIUM SULFATE HEPTAHYDRATE 1 G: 1 INJECTION, SOLUTION INTRAVENOUS at 16:01

## 2017-04-16 RX ADMIN — DOCUSATE SODIUM,SENNOSIDES 2 TABLET: 50; 8.6 TABLET, FILM COATED ORAL at 20:06

## 2017-04-16 RX ADMIN — PANTOPRAZOLE SODIUM 40 MG: 40 TABLET, DELAYED RELEASE ORAL at 06:11

## 2017-04-16 RX ADMIN — HYDROCODONE BITARTRATE AND ACETAMINOPHEN 1 TABLET: 7.5; 325 TABLET ORAL at 20:06

## 2017-04-16 RX ADMIN — METOPROLOL TARTRATE 50 MG: 50 TABLET ORAL at 15:02

## 2017-04-16 NOTE — PROGRESS NOTES
" LOS: 9 days   Patient Care Team:  Damian Chen MD as PCP - General (Family Medicine)  Damian Chen MD as PCP - Claims Attributed - PLEASE DO NOT REMOVE  Temo Cortez MD as Surgeon (Cardiothoracic Surgery)    Chief Complaint: post op cabg    Subjective:  Symptoms:  No shortness of breath, chest pain or weakness.    Diet:  Adequate intake.  No nausea or vomiting.    Pain:  He reports no pain.      \"they never came for repeat swallow, I had my wife bring in a diet coke, I'm swallowing fine\"    Vital Signs  Temp:  [97.9 °F (36.6 °C)-98.9 °F (37.2 °C)] 98 °F (36.7 °C)  Heart Rate:  [134-138] 138  Resp:  [16] 16  BP: (104-119)/(61-79) 104/68  Body mass index is 27.46 kg/(m^2).    Intake/Output Summary (Last 24 hours) at 04/16/17 0903  Last data filed at 04/16/17 0600   Gross per 24 hour   Intake              986 ml   Output              325 ml   Net              661 ml              Last 3 weights    04/14/17  0656 04/15/17  0505 04/16/17  0500   Weight: 182 lb 3.2 oz (82.6 kg) 181 lb 9.6 oz (82.4 kg) 180 lb 9.6 oz (81.9 kg)         Objective:  General Appearance:  Comfortable and in no acute distress.    Vital signs: (most recent): Blood pressure 104/68, pulse (!) 138, temperature 98 °F (36.7 °C), temperature source Oral, resp. rate 16, height 68\" (172.7 cm), weight 180 lb 9.6 oz (81.9 kg), SpO2 96 %.  No fever.    Output: Producing urine and no stool output.    Lungs:  Normal respiratory rate and normal effort.  No rales, rhonchi or decreased breath sounds.    Heart: Tachycardia.  Irregular rhythm.  No murmur, gallop or friction rub.   Extremities: There is no dependent edema.    Neurological: Patient is alert and oriented to person, place and time.    Skin:  Warm and dry.          Sitting up in chair, no pain with movement with exam.  Sternal and left incision well approximated without redness or drainage.    Results Review:        WBC WBC   Date Value Ref Range Status   04/15/2017 9.96 4.50 - 10.70 10*3/mm3 " Final   04/14/2017 10.55 4.50 - 10.70 10*3/mm3 Final      HGB Hemoglobin   Date Value Ref Range Status   04/15/2017 14.3 13.7 - 17.6 g/dL Final   04/14/2017 14.7 13.7 - 17.6 g/dL Final      HCT Hematocrit   Date Value Ref Range Status   04/15/2017 43.3 40.4 - 52.2 % Final   04/14/2017 45.8 40.4 - 52.2 % Final      Platelets Platelets   Date Value Ref Range Status   04/15/2017 358 140 - 500 10*3/mm3 Final   04/14/2017 313 140 - 500 10*3/mm3 Final        PT/INR:    Protime   Date Value Ref Range Status   04/16/2017 14.3 (H) 11.7 - 14.2 Seconds Final   04/15/2017 14.1 11.7 - 14.2 Seconds Final   04/14/2017 14.1 11.7 - 14.2 Seconds Final   /  INR   Date Value Ref Range Status   04/16/2017 1.15 (H) 0.90 - 1.10 Final   04/15/2017 1.13 (H) 0.90 - 1.10 Final   04/14/2017 1.13 (H) 0.90 - 1.10 Final       Sodium Sodium   Date Value Ref Range Status   04/16/2017 140 136 - 145 mmol/L Final   04/15/2017 138 136 - 145 mmol/L Final   04/14/2017 135 (L) 136 - 145 mmol/L Final      Potassium Potassium   Date Value Ref Range Status   04/16/2017 3.9 3.5 - 5.2 mmol/L Final   04/15/2017 4.3 3.5 - 5.2 mmol/L Final   04/15/2017 3.5 3.5 - 5.2 mmol/L Final   04/14/2017 4.2 3.5 - 5.2 mmol/L Final      Chloride Chloride   Date Value Ref Range Status   04/16/2017 102 98 - 107 mmol/L Final   04/15/2017 99 98 - 107 mmol/L Final   04/14/2017 98 98 - 107 mmol/L Final      Bicarbonate CO2   Date Value Ref Range Status   04/16/2017 23.7 22.0 - 29.0 mmol/L Final   04/15/2017 25.0 22.0 - 29.0 mmol/L Final   04/14/2017 23.4 22.0 - 29.0 mmol/L Final      BUN BUN   Date Value Ref Range Status   04/16/2017 15 8 - 23 mg/dL Final   04/15/2017 14 8 - 23 mg/dL Final   04/14/2017 10 8 - 23 mg/dL Final      Creatinine Creatinine   Date Value Ref Range Status   04/16/2017 0.82 0.76 - 1.27 mg/dL Final   04/15/2017 0.80 0.76 - 1.27 mg/dL Final   04/14/2017 0.74 (L) 0.76 - 1.27 mg/dL Final      Calcium Calcium   Date Value Ref Range Status   04/16/2017 8.9 8.6 -  10.5 mg/dL Final   04/15/2017 9.2 8.6 - 10.5 mg/dL Final   04/14/2017 9.2 8.6 - 10.5 mg/dL Final      Magnesium No results found for: MG         aspirin 81 mg Oral Daily   atorvastatin 40 mg Oral Nightly   digoxin 250 mcg Oral Daily   enoxaparin 40 mg Subcutaneous Nightly   metoprolol tartrate 50 mg Oral Q12H   mupirocin  Each Nare Daily   pantoprazole 40 mg Oral Q AM   sennosides-docusate sodium 2 tablet Oral Nightly   warfarin 2 mg Oral Daily       amiodarone 0.5 mg/min Last Rate: 0.5 mg/min (04/16/17 2534)           Patient Active Problem List   Diagnosis Code   • Anxiety F41.9   • Arthritis M19.90   • CAD (coronary artery disease) I25.10   • HLD (hyperlipidemia) E78.5   • Benign essential hypertension I10   • DDD (degenerative disc disease), lumbosacral M51.37   • Stroke I63.9   • Screening for prostate cancer Z12.5   • Hypertension I10   • Gastroesophageal reflux disease K21.9   • Hyperlipidemia E78.5   • New onset atrial flutter I48.92       Assessment & Plan    CAD S/P CABG X3 WITH LIMA-----POD #5, on asa/bb/statin  PREOP NEW ONSET A.FLUTTER-----rate still 130, cardiology managing, possible ablation evaluation pending, on coumadin  HTN  HL  QUESTIONABLE BRIAN----evaluate as outpatient  HX CVA S/P CEA  GERD  REMOTE KIDNEY STONES    Increase activity as tolerated with heart rate, continue pulmonary toilet. No bowel movement yet, give suppository today, if no result will give enema. Heart rate still 130- give 2 gm Magnesium prophylaxis, EP eval pending.  Patient is open to trying Digoxin again to control rate.    Flakita Villarreal, MY  04/16/17   9:03 AM     Atrial flutter persists; will administer Magnesium Sulfate 2g x1. Patient already on Amiodarone and B Blocker. Rhythm management as per Cardiology/EP services. Coumadin loading.

## 2017-04-16 NOTE — PROGRESS NOTES
"Madhu Santoro  1945 71 y.o.  [id number]      Patient Care Team:  Damian Chen MD as PCP - General (Family Medicine)  Damian Chen MD as PCP - Claims Attributed - PLEASE DO NOT REMOVE  Temo Cortez MD as Surgeon (Cardiothoracic Surgery)    CC: Status post CABG, atrial flutter, normal LV function    Interval History: Little bit of discomfort, still in flutter      Objective   Vital Signs  Temp:  [97.9 °F (36.6 °C)-98.9 °F (37.2 °C)] 98 °F (36.7 °C)  Heart Rate:  [134-138] 138  Resp:  [16] 16  BP: (104-119)/(61-79) 104/68    Intake/Output Summary (Last 24 hours) at 04/16/17 1055  Last data filed at 04/16/17 0600   Gross per 24 hour   Intake              986 ml   Output              325 ml   Net              661 ml     Flowsheet Rows         First Filed Value    Admission Height  68\" (172.7 cm) Documented at 04/07/2017 0935    Admission Weight  185 lb (83.9 kg) Documented at 04/07/2017 0935          Physical Exam:   General Appearance:    Alert,oriented, in no acute distress   Lungs:     Clear to auscultation,BS are equal    Heart:    Normal S1 and S2, RiRR without murmur, gallop or rub   HEENT:    Sclera are clear, no JVD or adenopathy   Abdomen:     Normal bowel sounds, soft non-tender, non-distended, no HSM   Extremities:   Moves all extremities well, no edema, no cyanosis, no             Redness, no rash     Medication Review:        aspirin 81 mg Oral Daily   atorvastatin 40 mg Oral Nightly   bisacodyl 10 mg Rectal Once   digoxin 250 mcg Oral Daily   enoxaparin 40 mg Subcutaneous Nightly   metoprolol tartrate 50 mg Oral Q12H   mupirocin  Each Nare Daily   pantoprazole 40 mg Oral Q AM   sennosides-docusate sodium 2 tablet Oral Nightly   warfarin 2 mg Oral Daily       amiodarone 0.5 mg/min Last Rate: 0.5 mg/min (04/16/17 1436)         I reviewed the patient's new clinical results.  I personally viewed and interpreted the patient's EKG/Telemetry data    Assessment/Plan  Active Hospital Problems (** " Indicates Principal Problem)    Diagnosis Date Noted   • New onset atrial flutter [I48.92] 04/07/2017      Resolved Hospital Problems    Diagnosis Date Noted Date Resolved   No resolved problems to display.       Early postop back in atrial flutter with RVR He is not anticoagulated and his atrial flutter is still fast.  I think were at a point where likely he may need a AR and ablation we will get the EP service involved tomorrow    Lisandro Emery MD  04/16/17  10:55 AM

## 2017-04-16 NOTE — PLAN OF CARE
Problem: Patient Care Overview (Adult)  Goal: Plan of Care Review  Outcome: Ongoing (interventions implemented as appropriate)    04/16/17 4640   Coping/Psychosocial Response Interventions   Plan Of Care Reviewed With patient   Patient Care Overview   Progress no change   Outcome Evaluation   Outcome Summary/Follow up Plan Remains in flutter with a rate in the 130's. Beta blocker frequency increased. Coumadin dose increased. Probable AR/abalation tomorrow.       Goal: Adult Individualization and Mutuality  Outcome: Ongoing (interventions implemented as appropriate)  Goal: Discharge Needs Assessment  Outcome: Ongoing (interventions implemented as appropriate)    Problem: Cardiac Surgery (Adult)  Goal: Signs and Symptoms of Listed Potential Problems Will be Absent or Manageable (Cardiac Surgery)  Outcome: Ongoing (interventions implemented as appropriate)    Problem: Fall Risk (Adult)  Goal: Absence of Falls  Outcome: Ongoing (interventions implemented as appropriate)    Problem: Skin Integrity Impairment, Risk/Actual (Adult)  Goal: Skin Integrity/Wound Healing  Outcome: Ongoing (interventions implemented as appropriate)

## 2017-04-16 NOTE — PLAN OF CARE
Problem: Patient Care Overview (Adult)  Goal: Plan of Care Review  Outcome: Ongoing (interventions implemented as appropriate)    04/16/17 8297   Coping/Psychosocial Response Interventions   Plan Of Care Reviewed With patient   Patient Care Overview   Progress progress toward functional goals is gradual   Outcome Evaluation   Outcome Summary/Follow up Plan Patient remains in aflutter with a rapid rate at times. Still on continous amio gtt. Possible ablation on Monday. Lortab for occasional pain.         Problem: Cardiac Surgery (Adult)  Goal: Signs and Symptoms of Listed Potential Problems Will be Absent or Manageable (Cardiac Surgery)  Outcome: Ongoing (interventions implemented as appropriate)

## 2017-04-17 ENCOUNTER — APPOINTMENT (OUTPATIENT)
Dept: CARDIOLOGY | Facility: HOSPITAL | Age: 72
End: 2017-04-17
Attending: INTERNAL MEDICINE

## 2017-04-17 LAB
ANION GAP SERPL CALCULATED.3IONS-SCNC: 14.3 MMOL/L
BUN BLD-MCNC: 11 MG/DL (ref 8–23)
BUN/CREAT SERPL: 14.1 (ref 7–25)
CALCIUM SPEC-SCNC: 8.9 MG/DL (ref 8.6–10.5)
CHLORIDE SERPL-SCNC: 98 MMOL/L (ref 98–107)
CO2 SERPL-SCNC: 24.7 MMOL/L (ref 22–29)
CREAT BLD-MCNC: 0.78 MG/DL (ref 0.76–1.27)
GFR SERPL CREATININE-BSD FRML MDRD: 98 ML/MIN/1.73
GLUCOSE BLD-MCNC: 114 MG/DL (ref 65–99)
INR PPP: 1.21 (ref 0.9–1.1)
POTASSIUM BLD-SCNC: 3.4 MMOL/L (ref 3.5–5.2)
PROTHROMBIN TIME: 14.9 SECONDS (ref 11.7–14.2)
SODIUM BLD-SCNC: 137 MMOL/L (ref 136–145)

## 2017-04-17 PROCEDURE — 25010000002 MIDAZOLAM PER 1 MG: Performed by: INTERNAL MEDICINE

## 2017-04-17 PROCEDURE — 93320 DOPPLER ECHO COMPLETE: CPT

## 2017-04-17 PROCEDURE — 93312 ECHO TRANSESOPHAGEAL: CPT

## 2017-04-17 PROCEDURE — 99024 POSTOP FOLLOW-UP VISIT: CPT | Performed by: PHYSICIAN ASSISTANT

## 2017-04-17 PROCEDURE — 97110 THERAPEUTIC EXERCISES: CPT

## 2017-04-17 PROCEDURE — 99222 1ST HOSP IP/OBS MODERATE 55: CPT | Performed by: NURSE PRACTITIONER

## 2017-04-17 PROCEDURE — 25010000002 ENOXAPARIN PER 10 MG: Performed by: THORACIC SURGERY (CARDIOTHORACIC VASCULAR SURGERY)

## 2017-04-17 PROCEDURE — 85610 PROTHROMBIN TIME: CPT | Performed by: THORACIC SURGERY (CARDIOTHORACIC VASCULAR SURGERY)

## 2017-04-17 PROCEDURE — 80048 BASIC METABOLIC PNL TOTAL CA: CPT | Performed by: THORACIC SURGERY (CARDIOTHORACIC VASCULAR SURGERY)

## 2017-04-17 PROCEDURE — 25010000002 FENTANYL CITRATE (PF) 100 MCG/2ML SOLUTION: Performed by: INTERNAL MEDICINE

## 2017-04-17 PROCEDURE — 93325 DOPPLER ECHO COLOR FLOW MAPG: CPT | Performed by: INTERNAL MEDICINE

## 2017-04-17 PROCEDURE — 25010000002 HYDROMORPHONE PER 4 MG: Performed by: INTERNAL MEDICINE

## 2017-04-17 PROCEDURE — 93312 ECHO TRANSESOPHAGEAL: CPT | Performed by: INTERNAL MEDICINE

## 2017-04-17 PROCEDURE — 93320 DOPPLER ECHO COMPLETE: CPT | Performed by: INTERNAL MEDICINE

## 2017-04-17 PROCEDURE — 25010000002 AMIODARONE IN DEXTROSE 5% 360 MG/200ML SOLUTION: Performed by: INTERNAL MEDICINE

## 2017-04-17 PROCEDURE — 93325 DOPPLER ECHO COLOR FLOW MAPG: CPT

## 2017-04-17 PROCEDURE — 99232 SBSQ HOSP IP/OBS MODERATE 35: CPT | Performed by: INTERNAL MEDICINE

## 2017-04-17 RX ORDER — SODIUM CHLORIDE 9 MG/ML
INJECTION, SOLUTION INTRAVENOUS
Status: COMPLETED | OUTPATIENT
Start: 2017-04-17 | End: 2017-04-17

## 2017-04-17 RX ORDER — POTASSIUM CHLORIDE 750 MG/1
20 CAPSULE, EXTENDED RELEASE ORAL DAILY
Status: DISCONTINUED | OUTPATIENT
Start: 2017-04-17 | End: 2017-04-19 | Stop reason: HOSPADM

## 2017-04-17 RX ORDER — FENTANYL CITRATE 50 UG/ML
INJECTION, SOLUTION INTRAMUSCULAR; INTRAVENOUS
Status: COMPLETED | OUTPATIENT
Start: 2017-04-17 | End: 2017-04-17

## 2017-04-17 RX ORDER — FUROSEMIDE 40 MG/1
40 TABLET ORAL DAILY
Status: DISCONTINUED | OUTPATIENT
Start: 2017-04-17 | End: 2017-04-19 | Stop reason: HOSPADM

## 2017-04-17 RX ORDER — MIDAZOLAM HYDROCHLORIDE 1 MG/ML
INJECTION INTRAMUSCULAR; INTRAVENOUS
Status: COMPLETED | OUTPATIENT
Start: 2017-04-17 | End: 2017-04-17

## 2017-04-17 RX ADMIN — POTASSIUM CHLORIDE 40 MEQ: 750 CAPSULE, EXTENDED RELEASE ORAL at 06:11

## 2017-04-17 RX ADMIN — AMIODARONE HYDROCHLORIDE 0.5 MG/MIN: 1.8 INJECTION, SOLUTION INTRAVENOUS at 06:13

## 2017-04-17 RX ADMIN — SODIUM CHLORIDE 15 ML: 9 INJECTION, SOLUTION INTRAVENOUS at 12:07

## 2017-04-17 RX ADMIN — METOPROLOL TARTRATE 50 MG: 50 TABLET ORAL at 04:27

## 2017-04-17 RX ADMIN — BENZOCAINE, BUTAMBEN, AND TETRACAINE HYDROCHLORIDE 1 SPRAY: .028; .004; .004 AEROSOL, SPRAY TOPICAL at 12:17

## 2017-04-17 RX ADMIN — LIDOCAINE HYDROCHLORIDE 10 ML: 20 SOLUTION ORAL; TOPICAL at 12:10

## 2017-04-17 RX ADMIN — ASPIRIN 81 MG: 81 TABLET ORAL at 14:36

## 2017-04-17 RX ADMIN — FENTANYL CITRATE 25 MCG: 50 INJECTION INTRAMUSCULAR; INTRAVENOUS at 12:21

## 2017-04-17 RX ADMIN — FUROSEMIDE 40 MG: 40 TABLET ORAL at 17:55

## 2017-04-17 RX ADMIN — METOPROLOL TARTRATE 50 MG: 50 TABLET ORAL at 20:25

## 2017-04-17 RX ADMIN — POTASSIUM CHLORIDE 20 MEQ: 750 CAPSULE, EXTENDED RELEASE ORAL at 17:55

## 2017-04-17 RX ADMIN — ATORVASTATIN CALCIUM 40 MG: 40 TABLET, FILM COATED ORAL at 20:25

## 2017-04-17 RX ADMIN — AMIODARONE HYDROCHLORIDE 0.5 MG/MIN: 1.8 INJECTION, SOLUTION INTRAVENOUS at 19:35

## 2017-04-17 RX ADMIN — MIDAZOLAM HYDROCHLORIDE 2 MG: 1 INJECTION, SOLUTION INTRAMUSCULAR; INTRAVENOUS at 12:21

## 2017-04-17 RX ADMIN — HYDROMORPHONE HYDROCHLORIDE 1 MG: 1 INJECTION, SOLUTION INTRAMUSCULAR; INTRAVENOUS; SUBCUTANEOUS at 08:11

## 2017-04-17 RX ADMIN — HYDROCODONE BITARTRATE AND ACETAMINOPHEN 1 TABLET: 7.5; 325 TABLET ORAL at 16:34

## 2017-04-17 RX ADMIN — PANTOPRAZOLE SODIUM 40 MG: 40 TABLET, DELAYED RELEASE ORAL at 06:11

## 2017-04-17 RX ADMIN — FENTANYL CITRATE 25 MCG: 50 INJECTION INTRAMUSCULAR; INTRAVENOUS at 12:30

## 2017-04-17 RX ADMIN — WARFARIN SODIUM 7.5 MG: 7.5 TABLET ORAL at 17:55

## 2017-04-17 RX ADMIN — ENOXAPARIN SODIUM 40 MG: 40 INJECTION SUBCUTANEOUS at 20:25

## 2017-04-17 RX ADMIN — METOPROLOL TARTRATE 50 MG: 50 TABLET ORAL at 14:36

## 2017-04-17 NOTE — PLAN OF CARE
Problem: Patient Care Overview (Adult)  Goal: Plan of Care Review  Outcome: Ongoing (interventions implemented as appropriate)    04/17/17 0450 04/17/17 0811 04/17/17 1033   Coping/Psychosocial Response Interventions   Plan Of Care Reviewed With --  patient --    Patient Care Overview   Progress improving --  --    Outcome Evaluation   Outcome Summary/Follow up Plan --  --  Complaints of midsternal surgical pain, and meds given per MD order. NPO for AR with Dr. Guerin today. Dr. Gustafson also to see for possible ablation.       Goal: Adult Individualization and Mutuality  Outcome: Ongoing (interventions implemented as appropriate)  Goal: Discharge Needs Assessment  Outcome: Ongoing (interventions implemented as appropriate)    Problem: Cardiac Surgery (Adult)  Goal: Signs and Symptoms of Listed Potential Problems Will be Absent or Manageable (Cardiac Surgery)  Outcome: Ongoing (interventions implemented as appropriate)    Problem: Fall Risk (Adult)  Goal: Absence of Falls  Outcome: Ongoing (interventions implemented as appropriate)    Problem: Skin Integrity Impairment, Risk/Actual (Adult)  Goal: Skin Integrity/Wound Healing  Outcome: Ongoing (interventions implemented as appropriate)

## 2017-04-17 NOTE — SIGNIFICANT NOTE
04/17/17 1039   Rehab Treatment   Discipline physical therapist   Treatment Not Performed patient unavailable for treatment  (pt off floor to cardiology this AM, will check back this afternoon)   Recommendation   PT - Next Appointment 04/17/17

## 2017-04-17 NOTE — PROGRESS NOTES
" LOS: 10 days   Patient Care Team:  Damian Chen MD as PCP - General (Family Medicine)  Damian Chen MD as PCP - Claims Attributed - PLEASE DO NOT REMOVE  Temo Cortez MD as Surgeon (Cardiothoracic Surgery)    Chief Complaint: post op cabg    Subjective  Denies difficulty with swallowing. Hopes to go home soon. States IV in left hand was bothering him earlier, so his IV was moved. AR today per Cardiology in preparation for ablation tomorrow. States hands feel swollen.      Vital Signs  Temp:  [98.2 °F (36.8 °C)-98.5 °F (36.9 °C)] 98.3 °F (36.8 °C)  Heart Rate:  [112-130] 130  Resp:  [16-20] 18  BP: ()/(59-85) 110/81  Body mass index is 28.22 kg/(m^2).    Intake/Output Summary (Last 24 hours) at 04/17/17 1601  Last data filed at 04/17/17 0806   Gross per 24 hour   Intake           200.04 ml   Output              400 ml   Net          -199.96 ml          Last 3 weights    04/15/17  0505 04/16/17  0500 04/17/17  0500   Weight: 181 lb 9.6 oz (82.4 kg) 180 lb 9.6 oz (81.9 kg) 185 lb 9.6 oz (84.2 kg)       Objective:  General Appearance:  Comfortable and in no acute distress (Sitting up in chair).    Vital signs: (most recent): Blood pressure 110/81, pulse (!) 130, temperature 98.3 °F (36.8 °C), temperature source Oral, resp. rate 18, height 68\" (172.7 cm), weight 185 lb 9.6 oz (84.2 kg), SpO2 95 %.  No fever.    Output: Producing urine and no stool output.    Lungs:  Normal respiratory rate and normal effort.  No rales, rhonchi or decreased breath sounds.    Heart: Tachycardia.  Irregular rhythm.  (Tele: Aflutter 130s)  Extremities: (Trace LE edema bilateral)  Neurological: Patient is alert and oriented to person, place and time.    Skin:  Warm and dry.  (Incisions healing well without erythema or drainage. Left hand slightly erythematous from IV infiltrate/amiodarone irritation. )      Results Review:        WBC WBC   Date Value Ref Range Status   04/15/2017 9.96 4.50 - 10.70 10*3/mm3 Final      HGB " Hemoglobin   Date Value Ref Range Status   04/15/2017 14.3 13.7 - 17.6 g/dL Final      HCT Hematocrit   Date Value Ref Range Status   04/15/2017 43.3 40.4 - 52.2 % Final      Platelets Platelets   Date Value Ref Range Status   04/15/2017 358 140 - 500 10*3/mm3 Final        PT/INR:    Protime   Date Value Ref Range Status   04/17/2017 14.9 (H) 11.7 - 14.2 Seconds Final   04/16/2017 14.3 (H) 11.7 - 14.2 Seconds Final   04/15/2017 14.1 11.7 - 14.2 Seconds Final   04/14/2017 14.1 11.7 - 14.2 Seconds Final   /  INR   Date Value Ref Range Status   04/17/2017 1.21 (H) 0.90 - 1.10 Final   04/16/2017 1.15 (H) 0.90 - 1.10 Final   04/15/2017 1.13 (H) 0.90 - 1.10 Final   04/14/2017 1.13 (H) 0.90 - 1.10 Final       Sodium Sodium   Date Value Ref Range Status   04/17/2017 137 136 - 145 mmol/L Final   04/16/2017 140 136 - 145 mmol/L Final   04/15/2017 138 136 - 145 mmol/L Final      Potassium Potassium   Date Value Ref Range Status   04/17/2017 3.4 (L) 3.5 - 5.2 mmol/L Final   04/16/2017 3.9 3.5 - 5.2 mmol/L Final   04/15/2017 4.3 3.5 - 5.2 mmol/L Final   04/15/2017 3.5 3.5 - 5.2 mmol/L Final      Chloride Chloride   Date Value Ref Range Status   04/17/2017 98 98 - 107 mmol/L Final   04/16/2017 102 98 - 107 mmol/L Final   04/15/2017 99 98 - 107 mmol/L Final      Bicarbonate CO2   Date Value Ref Range Status   04/17/2017 24.7 22.0 - 29.0 mmol/L Final   04/16/2017 23.7 22.0 - 29.0 mmol/L Final   04/15/2017 25.0 22.0 - 29.0 mmol/L Final      BUN BUN   Date Value Ref Range Status   04/17/2017 11 8 - 23 mg/dL Final   04/16/2017 15 8 - 23 mg/dL Final   04/15/2017 14 8 - 23 mg/dL Final      Creatinine Creatinine   Date Value Ref Range Status   04/17/2017 0.78 0.76 - 1.27 mg/dL Final   04/16/2017 0.82 0.76 - 1.27 mg/dL Final   04/15/2017 0.80 0.76 - 1.27 mg/dL Final      Calcium Calcium   Date Value Ref Range Status   04/17/2017 8.9 8.6 - 10.5 mg/dL Final   04/16/2017 8.9 8.6 - 10.5 mg/dL Final   04/15/2017 9.2 8.6 - 10.5 mg/dL Final       Magnesium No results found for: MG         aspirin 81 mg Oral Daily   atorvastatin 40 mg Oral Nightly   bisacodyl 10 mg Rectal Once   digoxin 250 mcg Oral Daily   enoxaparin 40 mg Subcutaneous Nightly   metoprolol tartrate 50 mg Oral Q8H   mupirocin  Each Nare Daily   pantoprazole 40 mg Oral Q AM   sennosides-docusate sodium 2 tablet Oral Nightly   warfarin 7.5 mg Oral Daily       amiodarone 0.5 mg/min Last Rate: 0.5 mg/min (04/17/17 0613)           Patient Active Problem List   Diagnosis Code   • Anxiety F41.9   • Arthritis M19.90   • CAD (coronary artery disease) I25.10   • HLD (hyperlipidemia) E78.5   • Benign essential hypertension I10   • DDD (degenerative disc disease), lumbosacral M51.37   • Stroke I63.9   • Screening for prostate cancer Z12.5   • Hypertension I10   • Gastroesophageal reflux disease K21.9   • Hyperlipidemia E78.5   • New onset atrial flutter I48.92       Assessment & Plan    CAD S/P CABG X3 WITH LIMA-----POD #6, on asa/bb/statin  PREOP NEW ONSET A.FLUTTER-----rate still 130, cardiology managing & planning ablation tomorrow, on coumadin & amio drip  HTN--stable on bb  HL  QUESTIONABLE BRIAN----evaluate as outpatient  HX CVA S/P CEA  GERD  REMOTE KIDNEY STONES    Routine care  Scheduled for aflutter ablation tomorrow  Coumadin per Cardiology  Encourage IS   Keep left hand elevated  Lasix 40 mg po daily with KCl 20 mEq    WINSTON Guevara  04/17/17   4:01 PM

## 2017-04-17 NOTE — PROGRESS NOTES
Acute Care - Physical Therapy Treatment Note  [facility]     Patient Name: Madhu Santoro  : 1945  MRN: [id number]  Today's Date: 2017  Onset of Illness/Injury or Date of Surgery Date: 17          Admit Date: 2017    Visit Dx:    ICD-10-CM ICD-9-CM   1. New onset atrial flutter I48.92 427.32   2. Coronary artery disease involving native coronary artery of native heart without angina pectoris I25.10 414.01   3. Generalized weakness R53.1 780.79   4. S/P CABG x 3 Z95.1 V45.81     Patient Active Problem List   Diagnosis   • Anxiety   • Arthritis   • CAD (coronary artery disease)   • HLD (hyperlipidemia)   • Benign essential hypertension   • DDD (degenerative disc disease), lumbosacral   • Stroke   • Screening for prostate cancer   • Hypertension   • Gastroesophageal reflux disease   • Hyperlipidemia   • New onset atrial flutter               Adult Rehabilitation Note       17 1500 17 0921 04/15/17 0922    Rehab Assessment/Intervention    Discipline physical therapist  -PC physical therapy assistant  -MM physical therapy assistant  -MM    Document Type therapy note (daily note)  -PC therapy note (daily note)  -MM therapy note (daily note)  -MM    Subjective Information  no complaints;agree to therapy  -MM agree to therapy  -MM    Patient Effort, Rehab Treatment  good  -MM good  -MM    Symptoms Noted During/After Treatment  none  -MM none  -MM    Precautions/Limitations  cardiac precautions;fall precautions  -MM cardiac precautions;fall precautions  -MM    Recorded by [PC] Naomi Tucker, PT [MM] Yandy Brower PTA [MM] Yandy Brower PTA    Pain Assessment    Pain Assessment No/denies pain  -PC No/denies pain  -MM No/denies pain  -MM    Recorded by [PC] Naomi Tucker, PT [MM] Yandy Brower PTA [MM] Yandy Brower PTA    Cognitive Assessment/Intervention    Current Cognitive/Communication Assessment functional  -PC      Recorded by [PC] Naomi Tucker, PT      Bed  Mobility, Assessment/Treatment    Bed Mob, Supine to Sit, [place]  not tested  -MM not tested  -MM    Bed Mob, Sit to Supine, [place]  not tested  -MM not tested  -MM    Bed Mobility, Comment in chair  -PC up in chair  -MM up in chair  -MM    Recorded by [PC] Naomi Tucker, PT [MM] Yandy Brower, PTA [MM] Yandy Brower, PTA    Transfer Assessment/Treatment    Transfers, Sit-Stand [place] supervision required  -PC supervision required  -MM stand by assist  -MM    Transfers, Stand-Sit [place] supervision required  -PC supervision required  -MM stand by assist  -MM    Recorded by [PC] Naomi Tucker, PT [MM] Yandy Brower, PTA [MM] Yandy Brower, PTA    Gait Assessment/Treatment    Gait, [place] Level supervision required  -PC stand by assist  -MM stand by assist  -MM    Gait, Distance (Feet) 600  -  -  -MM    Gait, Comment smooth gait pattern, normal jemal, did not attempt stairs due to IV pole, pt will have ablation tomorrow  -PC pt still has IV; pt stated he is scheduled for a procedure tomorrow.  -MM     Recorded by [PC] Naomi Tucker, PT [MM] Yandy Brower, PTA [MM] Yandy Brower PTA    Therapy Exercises    Exercise Protocols --   level IV cardiac protocol  -PC      Recorded by [PC] Naomi Tucker PT      Positioning and Restraints    Pre-Treatment Position sitting in chair/recliner  -PC sitting in chair/recliner  -MM sitting in chair/recliner  -MM    Post Treatment Position chair  -PC chair  -MM chair  -MM    In Chair reclined;call light within reach;encouraged to call for assist  -PC reclined;call light within reach;encouraged to call for assist  -MM reclined;call light within reach;encouraged to call for assist  -MM    Recorded by [PC] Naomi Tucker, PT [MM] Yandy Brower, PTA [MM] Yandy Brower PTA      User Key  (r) = Recorded By, (t) = Taken By, (c) = Cosigned By    Initials Name Effective Dates    PC Naomi Tucker, PT 12/01/15 -     MM Yadny ROBERTS  Leonarda, FREDY 02/18/16 -                 IP PT Goals       04/12/17 0858          Cardiopulmonary PT LTG    Cardiopulmonary PT LTG, Date Established 04/12/17  -EM      Cardiopulmonary PT LTG, Time to Achieve 1 wk  -EM      Cardiopulmonary PT LTG, Level Level V  -EM        User Key  (r) = Recorded By, (t) = Taken By, (c) = Cosigned By    Initials Name Provider Type    EM Susan Hurtado, PT Physical Therapist          Physical Therapy Education     Title: PT OT SLP Therapies (Active)     Topic: Physical Therapy (Active)     Point: Mobility training (Done)    Learning Progress Summary    Learner Readiness Method Response Comment Documented by Status   Patient Acceptance E,D ANNA AVILA   04/17/17 1524 Done    Acceptance E DU  MM 04/16/17 0924 Done    Acceptance E,D MARGARITA CASTILLO   04/14/17 0936 Done    Acceptance E NR   04/13/17 1013 Active    Acceptance E NR  EM 04/12/17 0857 Active   Family Acceptance E,D MARGARITA CASTILLO   04/14/17 0936 Done    Acceptance E NR   04/13/17 1013 Active               Point: Home exercise program (Active)    Learning Progress Summary    Learner Readiness Method Response Comment Documented by Status   Patient Acceptance E NR  EM 04/12/17 0857 Active               Point: Body mechanics (Done)    Learning Progress Summary    Learner Readiness Method Response Comment Documented by Status   Patient Acceptance E,D ANNA AVIAL   04/17/17 1524 Done    Acceptance E DU   04/15/17 0924 Done                      User Key     Initials Effective Dates Name Provider Type Discipline     12/01/15 -  Naomi Tucker, PT Physical Therapist PT    EM 12/01/15 -  Susan Hurtado, PT Physical Therapist PT    MM 02/18/16 -  Yandy Brower, PTA Physical Therapy Assistant PT     01/31/17 -  Miguel Stephenson, PT Student PT Student PT                    PT Recommendation and Plan  Anticipated Discharge Disposition: home with assist  Planned Therapy Interventions: bed mobility training, gait training, home exercise  program  PT Frequency: daily  Plan of Care Review  Plan Of Care Reviewed With: patient  Progress: progress toward functional goals as expected  Outcome Summary/Follow up Plan: good progress with mobility, stairs not attempted due to IV pole, and pt to have an ablation tomorrow, so will keep pt on PT list until after that to assess mobility          Outcome Measures       04/17/17 1500 04/16/17 0900 04/15/17 0900    How much help from another person do you currently need...    Turning from your back to your side while in flat bed without using bedrails? 4  -PC 3  -MM 3  -MM    Moving from lying on back to sitting on the side of a flat bed without bedrails? 4  -PC 3  -MM 3  -MM    Moving to and from a bed to a chair (including a wheelchair)? 4  -PC 4  -MM 3  -MM    Standing up from a chair using your arms (e.g., wheelchair, bedside chair)? 4  -PC 4  -MM 4  -MM    Climbing 3-5 steps with a railing? 3  -PC 3  -MM 3  -MM    To walk in hospital room? 4  -PC 4  -MM 4  -MM    AM-PAC 6 Clicks Score 23  -PC 21  -MM 20  -MM      User Key  (r) = Recorded By, (t) = Taken By, (c) = Cosigned By    Initials Name Provider Type    PC Naomi Tucker, PT Physical Therapist    MM Yandy Brower, PTA Physical Therapy Assistant           Time Calculation:         PT Charges       04/17/17 1527 04/17/17 1039       Time Calculation    Start Time 1455  -PC      Stop Time 1505  -PC      Time Calculation (min) 10 min  -PC      PT Received On 04/17/17  -PC      PT - Next Appointment 04/18/17  -PC 04/17/17  -PC (r) CH (t) PC (c)       User Key  (r) = Recorded By, (t) = Taken By, (c) = Cosigned By    Initials Name Provider Type    PC Naomi Tucker, PT Physical Therapist    NORY Stephenson, PT Student PT Student          Therapy Charges for Today     Code Description Service Date Service Provider Modifiers Qty    [id number] HC PT THER PROC EA 15 MIN 4/17/2017 Naomi Tucker, PT GP 1          PT G-Codes  Outcome Measure Options: AM-PAC 6  Clicks Daily Activity (OT)    Naomi Tucker, PT  4/17/2017

## 2017-04-17 NOTE — PLAN OF CARE
Problem: Patient Care Overview (Adult)  Goal: Plan of Care Review  Outcome: Ongoing (interventions implemented as appropriate)    04/17/17 1035   Coping/Psychosocial Response Interventions   Plan Of Care Reviewed With patient   Patient Care Overview   Progress progress toward functional goals as expected   Outcome Evaluation   Outcome Summary/Follow up Plan good progress with mobility, stairs not attempted due to IV pole, and pt to have an ablation tomorrow, so will keep pt on PT list until after that to ensure mobility cont to improve

## 2017-04-17 NOTE — PLAN OF CARE
Problem: Patient Care Overview (Adult)  Goal: Plan of Care Review  Outcome: Ongoing (interventions implemented as appropriate)    04/17/17 0450   Coping/Psychosocial Response Interventions   Plan Of Care Reviewed With patient   Patient Care Overview   Progress improving   Outcome Evaluation   Outcome Summary/Follow up Plan Minimal c/o pain. Pain med given x1 during shift. Remains in aflutter and on aminodrane gtt.. NPO for for potential ablation in am. BP stable. Restful night.       Goal: Adult Individualization and Mutuality  Outcome: Ongoing (interventions implemented as appropriate)  Goal: Discharge Needs Assessment  Outcome: Ongoing (interventions implemented as appropriate)    Problem: Cardiac Surgery (Adult)  Goal: Signs and Symptoms of Listed Potential Problems Will be Absent or Manageable (Cardiac Surgery)  Outcome: Ongoing (interventions implemented as appropriate)    Problem: Fall Risk (Adult)  Goal: Absence of Falls  Outcome: Ongoing (interventions implemented as appropriate)    Problem: Skin Integrity Impairment, Risk/Actual (Adult)  Goal: Skin Integrity/Wound Healing  Outcome: Ongoing (interventions implemented as appropriate)

## 2017-04-17 NOTE — PROGRESS NOTES
LOS: 10 days   Patient Care Team:  Damian Chen MD as PCP - General (Family Medicine)  Damian Chen MD as PCP - Claims Attributed - PLEASE DO NOT REMOVE  Temo Cortez MD as Surgeon (Cardiothoracic Surgery)    Chief Complaint: Follow-up for CAD, paroxysmal typical atrial flutter.    Interval History: Still in rapid atrial flutter.  No CP or SOA.      Vital Signs:  Temp:  [98 °F (36.7 °C)-98.5 °F (36.9 °C)] 98.3 °F (36.8 °C)  Heart Rate:  [112-138] 112  Resp:  [16] 16  BP: ()/(65-84) 112/65    Intake/Output Summary (Last 24 hours) at 04/17/17 0730  Last data filed at 04/17/17 0613   Gross per 24 hour   Intake           400.04 ml   Output              550 ml   Net          -149.96 ml       Physical Exam:   General Appearance:    No acute distress, alert and oriented x4   Lungs:     Clear to auscultation bilaterally     Heart:    Regular rhythm and normal rate.  No murmurs, gallops, or       rubs.   Abdomen:     Soft, non-tender, non-distended.    Extremities:   Trace edema.     Results Review:      Results from last 7 days  Lab Units 04/17/17  0343   SODIUM mmol/L 137   POTASSIUM mmol/L 3.4*   CHLORIDE mmol/L 98   TOTAL CO2 mmol/L 24.7   BUN mg/dL 11   CREATININE mg/dL 0.78   GLUCOSE mg/dL 114*   CALCIUM mg/dL 8.9           Results from last 7 days  Lab Units 04/15/17  0332   WBC 10*3/mm3 9.96   HEMOGLOBIN g/dL 14.3   HEMATOCRIT % 43.3   PLATELETS 10*3/mm3 358       Results from last 7 days  Lab Units 04/17/17  0343 04/16/17  0436 04/15/17  0332  04/11/17  1059   INR  1.21* 1.15* 1.13*  < > 1.54*   APTT seconds  --   --   --   --  33.3   < > = values in this interval not displayed.    Results from last 7 days  Lab Units 04/10/17  1520   CHOLESTEROL mg/dL 138       Results from last 7 days  Lab Units 04/12/17  0313   MAGNESIUM mg/dL 2.5*       Results from last 7 days  Lab Units 04/10/17  1520   CHOLESTEROL mg/dL 138   TRIGLYCERIDES mg/dL 68   HDL CHOL mg/dL 43       I reviewed the patient's new  clinical results.        Assessment:  1. Unstable angina with severe CAD (left main) - status post 3 vessel CABG 4/11/17  2. Paroxysmal atrial flutter (typcial)  3. Hypertension  4. Hyperlipidemia  5. PVD, s/p CEA  6. Pre-op EF 55-60%    Plan:  -Needs an ablation for atrial flutter at this point.  -Will schedule a AR for today and discuss with Dr. Gustafson.   -Continue Coumadin 7.5 mg per day  -Continue Metoprolol, Amio gtt for now.  -Continue Lipitor and ASA    Eric Wright MD  04/17/17  7:30 AM

## 2017-04-17 NOTE — SIGNIFICANT NOTE
04/17/17 0911   Rehab Treatment   Discipline occupational therapist   Treatment Not Performed patient/family declined treatment  (Pt politely declining participation, recently completed ADLs)

## 2017-04-17 NOTE — CONSULTS
Electrophysiology Hospital Consult            Patient Name: Madhu Santoro  Age/Sex: 71 y.o. male  : 1945  MRN: [id number]    Date of Admission: 2017  Date of Encounter Visit: 17  Encounter Provider: MY Schultz  Referring Provider: Eric Wright MD  Place of Service: [facility] CARDIOLOGY  Patient Care Team:  Damian Chen MD as PCP - General (Family Medicine)  Damian Chen MD as PCP - Claims Attributed - PLEASE DO NOT REMOVE  Temo Cortez MD as Surgeon (Cardiothoracic Surgery)      Subjective:   EP Consultation for: Atrial flutter    Chief Complaint: Atrial flutter    History of Present Illness:    Madhu Santoro is a 71 y.o. male who went to his PCP, Dr. Chen, to be seen and get refills on his medications and was noted to have a rapid heart beat heart rate 160's. He was sent to there ER and admitted for new onset atrial flutter. He was unaware of the rapid heartbeat. He was placed on diltiazem gtt and he did convert to SR prior to surgery. He denied any chest pain, shortness of breath, PND, dizziness or syncope during that time. He did describe on admission an episode of chest pain that radiated up to his neck and through to his back and he had a choking sensation a few days prior to admission. He said it lasted a couple of hours and then went away. He did not have any nausea, vomiting or diaphoresis with the episode. He was admitted and underwent a cardiac workup which revealed that he had progression of his CAD and he was sent for coronary artery bypass surgery. He underwent CABG x3 w/ROSARIO by Dr. Cotrez on 2017. He was placed on amiodarone and beta blocker postoperatively in hopes to prevent the atrial flutter from recurring but he went back in to atrial flutter with RVR on 17 at 5:47am. He was started on amiodarone gtt and given IV digoxin but he has remained in atrial flutter and the rate has been difficult to  control. We are ask to see for possible ablation. Since he has not been on anticoagulation consistently, he underwent a AR this am to check for LA thrombus, per verbal report from Dr. Wright, there was no clot.     Of note he does have a history of carotid disease, s/p CEA and was on clopidogrel and aspirin on admission and they have now been resumed and he has been started on warfarin. Will discuss with MD's as he probably does not need triple therapy.       Past Medical History:  Past Medical History:   Diagnosis Date   • Anxiety    • Arthritis    • Benign essential hypertension    • CAD (coronary artery disease)    • Chest pain    • Colonic polyp    • DDD (degenerative disc disease), lumbosacral    • H/O bone density study 2013   • H/O complete eye exam 2014   • HLD (hyperlipidemia)    • Hypertension    • Lipid screening 05/31/2013   • Low back pain     [facility]   • Screening for prostate cancer 07/07/2015   • Stroke        Past Surgical History:   Procedure Laterality Date   • CARDIAC CATHETERIZATION N/A 4/10/2017    Procedure: Left Heart Cath;  Surgeon: Marjorie Healy MD;  Location: [facility] INVASIVE LOCATION;  Service:    • CARDIAC CATHETERIZATION N/A 4/10/2017    Procedure: Coronary angiography;  Surgeon: Marjorie Healy MD;  Location: [facility] CATH INVASIVE LOCATION;  Service:    • CARDIAC CATHETERIZATION N/A 4/10/2017    Procedure: Left ventriculography;  Surgeon: Marjorie Healy MD;  Location: [facility] CATH INVASIVE LOCATION;  Service:    • COLONOSCOPY  2010   • CORONARY ARTERY BYPASS GRAFT N/A 4/11/2017    Procedure: AR STERNOTOMY CORONARY ARTERY BYPASS GRAFT TIMES 3 USING LEFT INTERNAL MAMMARY ARTERY AND LEFT GREATER SAPHENOUS VEIN GRAFT PER ENDOSCOPIC VEIN HARVESTING AND PRP ;  Surgeon: Temo Cortez MD;  [facility] OR;  Service:        Home Medications:   Prescriptions Prior to Admission   Medication Sig Dispense Refill Last Dose   • amLODIPine  (NORVASC) 5 MG tablet Take 1 tablet by mouth Daily. 90 tablet 1    • aspirin 81 MG tablet Take 81 mg by mouth daily.   Taking   • clopidogrel (PLAVIX) 75 MG tablet Take 1 tablet by mouth Daily. 90 tablet 1    • Hydrocodone-Acetaminophen (VICODIN) 5-300 MG per tablet Take 1 tablet by mouth 2 (Two) Times a Day. 60 tablet 0    • nitroglycerin (NITROSTAT) 0.4 MG SL tablet Place 1 tablet under the tongue Every 5 (Five) Minutes As Needed for chest pain. Take no more than 3 doses in 15 minutes. 20 tablet 1 Taking   • omeprazole (priLOSEC) 20 MG capsule Take 1 capsule by mouth Daily. 90 capsule 1    • ramipril (ALTACE) 5 MG capsule Take 1 capsule by mouth Daily. 90 capsule 1    • rosuvastatin (CRESTOR) 10 MG tablet Take 1 tablet by mouth Daily. 90 tablet 1        Allergies:  Allergies   Allergen Reactions   • Penicillins        Past Social History:  Social History     Social History   • Marital status:      Spouse name: N/A   • Number of children: N/A   • Years of education: N/A     Occupational History   • Not on file.     Social History Main Topics   • Smoking status: Former Smoker   • Smokeless tobacco: Never Used   • Alcohol use No   • Drug use: No   • Sexual activity: Yes     Partners: Female     Other Topics Concern   • Not on file     Social History Narrative       Past Family History:  Family History   Problem Relation Age of Onset   • Heart disease Mother    • Heart attack Mother    • Stroke Mother    • Heart disease Father    • Heart attack Father    • Stroke Father    • Arthritis Other    • Diabetes Other    • Hypertension Other    • Kidney disease Other      stones       Review of Systems: All systems reviewed. Pertinent positives identified in HPI. All other systems are negative.     14 point ROS was performed and is negative except as outlined in HPI.     Objective:     Objective:  Vital Signs (last 24 hours)       04/16 0700  -  04/17 0659 04/17 0700  -  04/17 1515   Most Recent    Temp (°F) 98 -  98.5     98.2 -  98.3     98.3 (36.8)    Heart Rate 112 -  (!)138    (!)126 -  (!)130     (!) 130    Resp   16    16 -  20     18    BP 99/71 -  123/72    107/59 -  130/74     110/81    SpO2 (%) 91 -  96    95 -  99     95        Temp:  [98.2 °F (36.8 °C)-98.5 °F (36.9 °C)] 98.3 °F (36.8 °C)  Heart Rate:  [112-130] 130  Resp:  [16-20] 18  BP: ()/(59-85) 110/81  Body mass index is 28.22 kg/(m^2).        Physical Exam:   Physical Exam   Constitutional: He is oriented to person, place, and time. He appears well-developed and well-nourished. No distress.   HENT:   Head: Normocephalic and atraumatic.   Eyes: Conjunctivae are normal. No scleral icterus.   Neck: Neck supple. No JVD present.   Cardiovascular: Regular rhythm and intact distal pulses.    Tachycardic   Pulmonary/Chest: Effort normal and breath sounds normal. No respiratory distress.   Abdominal: Soft.   Musculoskeletal: Normal range of motion. He exhibits edema.   Neurological: He is alert and oriented to person, place, and time.   Skin: Skin is warm and dry.   Incision wnl   Psychiatric: He has a normal mood and affect. His behavior is normal.   Vitals reviewed.       Labs:   Lab Review:       Results from last 7 days  Lab Units 04/17/17  0343 04/16/17  0436 04/15/17  1601 04/15/17  0332 04/14/17  0341 04/13/17  0406 04/12/17  0313  04/11/17  1441  04/10/17  1520   SODIUM mmol/L 137 140  --  138 135* 139 141  --  142  < > 141   POTASSIUM mmol/L 3.4* 3.9 4.3 3.5 4.2 4.6 4.4  < > 4.3  < > 4.2   CHLORIDE mmol/L 98 102  --  99 98 101 106  --  108*  < > 104   TOTAL CO2 mmol/L 24.7 23.7  --  25.0 23.4 25.7 21.4*  --  18.3*  < > 23.9   BUN mg/dL 11 15  --  14 10 11 11  --  13  < > 12   CREATININE mg/dL 0.78 0.82  --  0.80 0.74* 0.83 0.72*  --  1.05  < > 0.89   GLUCOSE mg/dL 114* 108*  --  126* 121* 118* 121*  --  183*  < > 82   CALCIUM mg/dL 8.9 8.9  --  9.2 9.2 8.7 8.4*  --  8.8  < > 9.2   AST (SGOT) U/L  --   --   --   --   --   --   --   --   --   --  38   ALT  (SGPT) U/L  --   --   --   --   --   --   --   --   --   --  31   < > = values in this interval not displayed.        Results from last 7 days  Lab Units 04/15/17  0332   WBC 10*3/mm3 9.96   HEMOGLOBIN g/dL 14.3   HEMATOCRIT % 43.3   PLATELETS 10*3/mm3 358       Results from last 7 days  Lab Units 17  0343 17  0436 04/15/17  0332  17  1059   INR  1.21* 1.15* 1.13*  < > 1.54*   APTT seconds  --   --   --   --  33.3   < > = values in this interval not displayed.    Results from last 7 days  Lab Units 04/10/17  1520   CHOLESTEROL mg/dL 138       Results from last 7 days  Lab Units 17  0313   MAGNESIUM mg/dL 2.5*       Results from last 7 days  Lab Units 04/10/17  1520   CHOLESTEROL mg/dL 138   TRIGLYCERIDES mg/dL 68   HDL CHOL mg/dL 43               Results from last 7 days  Lab Units 04/10/17  1520   TSH mIU/mL 5.540*           Imagin/8/2017 Echo:    Interpretation Summary      · Estimated EF appears to be in the range of 56 - 60%.  · Left atrial cavity size is mild-to-moderately dilated.  · Mild mitral valve regurgitation is present.  · The calculated RVSP is 25 mm Hg (normal)..  · There is a trivial pericardial effusion.         Tele/EKG's:                         I personally viewed and interpreted the patient's EKG/Telemetry data.    Assessment:     Active Problems:    New onset atrial flutter        Plan:     -Admitted with atrial flutter and has had recurrent s/p CABG>plan for ablation in am  -CAD, s/p CABG 2017  -NL LV systolic function 55-60% by echo 17.  -HTN, stable  -HLD, treated  -Hx of carotid disease, s/p CEA, hx of CVA (on clopidogrel)  -Currently on triple therapy (ASA, clopidogrel and warfarin)-will discuss with MD's as he probably does not need triple therapy. INR 1.21 today.     Ablation scheduled for am.     Thank you for allowing me to participate in the care of Madhu SHANAE CrossMaude. Feel free to contact me directly with any further questions or  concerns.    MY Schultz  [facility]  04/17/17  3:15 PM

## 2017-04-18 ENCOUNTER — ANESTHESIA EVENT (OUTPATIENT)
Dept: CARDIOLOGY | Facility: HOSPITAL | Age: 72
End: 2017-04-18

## 2017-04-18 ENCOUNTER — ANESTHESIA (OUTPATIENT)
Dept: CARDIOLOGY | Facility: HOSPITAL | Age: 72
End: 2017-04-18

## 2017-04-18 LAB
ANION GAP SERPL CALCULATED.3IONS-SCNC: 15.4 MMOL/L
BH CV ECHO MEAS - BSA(HAYCOCK): 2 M^2
BH CV ECHO MEAS - BSA: 2 M^2
BH CV ECHO MEAS - BZI_BMI: 28.1 KILOGRAMS/M^2
BH CV ECHO MEAS - BZI_METRIC_HEIGHT: 172.7 CM
BH CV ECHO MEAS - BZI_METRIC_WEIGHT: 83.9 KG
BUN BLD-MCNC: 11 MG/DL (ref 8–23)
BUN/CREAT SERPL: 12.8 (ref 7–25)
CALCIUM SPEC-SCNC: 9 MG/DL (ref 8.6–10.5)
CHLORIDE SERPL-SCNC: 97 MMOL/L (ref 98–107)
CO2 SERPL-SCNC: 24.6 MMOL/L (ref 22–29)
CREAT BLD-MCNC: 0.86 MG/DL (ref 0.76–1.27)
DEPRECATED RDW RBC AUTO: 47.1 FL (ref 37–54)
ERYTHROCYTE [DISTWIDTH] IN BLOOD BY AUTOMATED COUNT: 13.6 % (ref 11.5–14.5)
GFR SERPL CREATININE-BSD FRML MDRD: 87 ML/MIN/1.73
GLUCOSE BLD-MCNC: 110 MG/DL (ref 65–99)
HCT VFR BLD AUTO: 41.4 % (ref 40.4–52.2)
HGB BLD-MCNC: 13.5 G/DL (ref 13.7–17.6)
INR PPP: 1.65 (ref 0.9–1.1)
LV EF 2D ECHO EST: 55 %
MCH RBC QN AUTO: 31 PG (ref 27–32.7)
MCHC RBC AUTO-ENTMCNC: 32.6 G/DL (ref 32.6–36.4)
MCV RBC AUTO: 95 FL (ref 79.8–96.2)
PLATELET # BLD AUTO: 444 10*3/MM3 (ref 140–500)
PMV BLD AUTO: 10.7 FL (ref 6–12)
POTASSIUM BLD-SCNC: 4 MMOL/L (ref 3.5–5.2)
PROTHROMBIN TIME: 18.9 SECONDS (ref 11.7–14.2)
RBC # BLD AUTO: 4.36 10*6/MM3 (ref 4.6–6)
SODIUM BLD-SCNC: 137 MMOL/L (ref 136–145)
WBC NRBC COR # BLD: 9.84 10*3/MM3 (ref 4.5–10.7)

## 2017-04-18 PROCEDURE — C1894 INTRO/SHEATH, NON-LASER: HCPCS | Performed by: INTERNAL MEDICINE

## 2017-04-18 PROCEDURE — 25010000002 SUCCINYLCHOLINE PER 20 MG: Performed by: ANESTHESIOLOGY

## 2017-04-18 PROCEDURE — 93005 ELECTROCARDIOGRAM TRACING: CPT | Performed by: INTERNAL MEDICINE

## 2017-04-18 PROCEDURE — C1893 INTRO/SHEATH, FIXED,NON-PEEL: HCPCS | Performed by: INTERNAL MEDICINE

## 2017-04-18 PROCEDURE — C1730 CATH, EP, 19 OR FEW ELECT: HCPCS | Performed by: INTERNAL MEDICINE

## 2017-04-18 PROCEDURE — 4A0234Z MEASUREMENT OF CARDIAC ELECTRICAL ACTIVITY, PERCUTANEOUS APPROACH: ICD-10-PCS | Performed by: INTERNAL MEDICINE

## 2017-04-18 PROCEDURE — 25010000002 PHENYLEPHRINE PER 1 ML: Performed by: ANESTHESIOLOGY

## 2017-04-18 PROCEDURE — 25010000002 PROPOFOL 10 MG/ML EMULSION: Performed by: ANESTHESIOLOGY

## 2017-04-18 PROCEDURE — 02K83ZZ MAP CONDUCTION MECHANISM, PERCUTANEOUS APPROACH: ICD-10-PCS | Performed by: INTERNAL MEDICINE

## 2017-04-18 PROCEDURE — 4A023FZ MEASUREMENT OF CARDIAC RHYTHM, PERCUTANEOUS APPROACH: ICD-10-PCS | Performed by: INTERNAL MEDICINE

## 2017-04-18 PROCEDURE — 94762 N-INVAS EAR/PLS OXIMTRY CONT: CPT

## 2017-04-18 PROCEDURE — 25010000002 ONDANSETRON PER 1 MG: Performed by: ANESTHESIOLOGY

## 2017-04-18 PROCEDURE — 99232 SBSQ HOSP IP/OBS MODERATE 35: CPT | Performed by: INTERNAL MEDICINE

## 2017-04-18 PROCEDURE — 93010 ELECTROCARDIOGRAM REPORT: CPT | Performed by: INTERNAL MEDICINE

## 2017-04-18 PROCEDURE — C1732 CATH, EP, DIAG/ABL, 3D/VECT: HCPCS | Performed by: INTERNAL MEDICINE

## 2017-04-18 PROCEDURE — 93613 INTRACARDIAC EPHYS 3D MAPG: CPT | Performed by: INTERNAL MEDICINE

## 2017-04-18 PROCEDURE — 85610 PROTHROMBIN TIME: CPT | Performed by: THORACIC SURGERY (CARDIOTHORACIC VASCULAR SURGERY)

## 2017-04-18 PROCEDURE — 92610 EVALUATE SWALLOWING FUNCTION: CPT

## 2017-04-18 PROCEDURE — 80048 BASIC METABOLIC PNL TOTAL CA: CPT | Performed by: THORACIC SURGERY (CARDIOTHORACIC VASCULAR SURGERY)

## 2017-04-18 PROCEDURE — 25010000002 FENTANYL CITRATE (PF) 100 MCG/2ML SOLUTION: Performed by: ANESTHESIOLOGY

## 2017-04-18 PROCEDURE — C1731 CATH, EP, 20 OR MORE ELEC: HCPCS | Performed by: INTERNAL MEDICINE

## 2017-04-18 PROCEDURE — 93653 COMPRE EP EVAL TX SVT: CPT | Performed by: INTERNAL MEDICINE

## 2017-04-18 PROCEDURE — 93621 COMP EP EVL L PAC&REC C SINS: CPT | Performed by: INTERNAL MEDICINE

## 2017-04-18 PROCEDURE — 85027 COMPLETE CBC AUTOMATED: CPT | Performed by: INTERNAL MEDICINE

## 2017-04-18 PROCEDURE — 25010000002 MIDAZOLAM PER 1 MG: Performed by: INTERNAL MEDICINE

## 2017-04-18 PROCEDURE — 25010000002 MIDAZOLAM PER 1 MG: Performed by: ANESTHESIOLOGY

## 2017-04-18 PROCEDURE — 97110 THERAPEUTIC EXERCISES: CPT

## 2017-04-18 PROCEDURE — 02583ZZ DESTRUCTION OF CONDUCTION MECHANISM, PERCUTANEOUS APPROACH: ICD-10-PCS | Performed by: INTERNAL MEDICINE

## 2017-04-18 RX ORDER — MIDAZOLAM HYDROCHLORIDE 1 MG/ML
INJECTION INTRAMUSCULAR; INTRAVENOUS AS NEEDED
Status: DISCONTINUED | OUTPATIENT
Start: 2017-04-18 | End: 2017-04-18 | Stop reason: SURG

## 2017-04-18 RX ORDER — HYDROCODONE BITARTRATE AND ACETAMINOPHEN 5; 325 MG/1; MG/1
1 TABLET ORAL EVERY 4 HOURS PRN
Status: DISCONTINUED | OUTPATIENT
Start: 2017-04-18 | End: 2017-04-18

## 2017-04-18 RX ORDER — OXYCODONE HYDROCHLORIDE AND ACETAMINOPHEN 5; 325 MG/1; MG/1
1 TABLET ORAL EVERY 4 HOURS PRN
Status: DISCONTINUED | OUTPATIENT
Start: 2017-04-18 | End: 2017-04-18

## 2017-04-18 RX ORDER — MIDAZOLAM HYDROCHLORIDE 1 MG/ML
INJECTION INTRAMUSCULAR; INTRAVENOUS AS NEEDED
Status: DISCONTINUED | OUTPATIENT
Start: 2017-04-18 | End: 2017-04-18 | Stop reason: HOSPADM

## 2017-04-18 RX ORDER — ASPIRIN 81 MG/1
81 TABLET, CHEWABLE ORAL DAILY
Status: DISCONTINUED | OUTPATIENT
Start: 2017-04-18 | End: 2017-04-19

## 2017-04-18 RX ORDER — DIPHENHYDRAMINE HYDROCHLORIDE 50 MG/ML
12.5 INJECTION INTRAMUSCULAR; INTRAVENOUS
Status: DISCONTINUED | OUTPATIENT
Start: 2017-04-18 | End: 2017-04-18 | Stop reason: HOSPADM

## 2017-04-18 RX ORDER — HYDROCODONE BITARTRATE AND ACETAMINOPHEN 10; 325 MG/1; MG/1
1 TABLET ORAL EVERY 4 HOURS PRN
Status: DISCONTINUED | OUTPATIENT
Start: 2017-04-18 | End: 2017-04-19 | Stop reason: HOSPADM

## 2017-04-18 RX ORDER — FENTANYL CITRATE 50 UG/ML
INJECTION, SOLUTION INTRAMUSCULAR; INTRAVENOUS AS NEEDED
Status: DISCONTINUED | OUTPATIENT
Start: 2017-04-18 | End: 2017-04-18 | Stop reason: SURG

## 2017-04-18 RX ORDER — PROMETHAZINE HYDROCHLORIDE 25 MG/ML
12.5 INJECTION, SOLUTION INTRAMUSCULAR; INTRAVENOUS ONCE AS NEEDED
Status: DISCONTINUED | OUTPATIENT
Start: 2017-04-18 | End: 2017-04-18 | Stop reason: HOSPADM

## 2017-04-18 RX ORDER — ONDANSETRON 2 MG/ML
4 INJECTION INTRAMUSCULAR; INTRAVENOUS ONCE AS NEEDED
Status: DISCONTINUED | OUTPATIENT
Start: 2017-04-18 | End: 2017-04-18 | Stop reason: HOSPADM

## 2017-04-18 RX ORDER — HYDRALAZINE HYDROCHLORIDE 20 MG/ML
5 INJECTION INTRAMUSCULAR; INTRAVENOUS
Status: DISCONTINUED | OUTPATIENT
Start: 2017-04-18 | End: 2017-04-18 | Stop reason: HOSPADM

## 2017-04-18 RX ORDER — NALOXONE HCL 0.4 MG/ML
0.4 VIAL (ML) INJECTION
Status: DISCONTINUED | OUTPATIENT
Start: 2017-04-18 | End: 2017-04-19 | Stop reason: HOSPADM

## 2017-04-18 RX ORDER — MIDAZOLAM HYDROCHLORIDE 1 MG/ML
1 INJECTION INTRAMUSCULAR; INTRAVENOUS
Status: DISCONTINUED | OUTPATIENT
Start: 2017-04-18 | End: 2017-04-18 | Stop reason: HOSPADM

## 2017-04-18 RX ORDER — ONDANSETRON 2 MG/ML
INJECTION INTRAMUSCULAR; INTRAVENOUS AS NEEDED
Status: DISCONTINUED | OUTPATIENT
Start: 2017-04-18 | End: 2017-04-18 | Stop reason: SURG

## 2017-04-18 RX ORDER — PROMETHAZINE HYDROCHLORIDE 25 MG/1
25 SUPPOSITORY RECTAL ONCE AS NEEDED
Status: DISCONTINUED | OUTPATIENT
Start: 2017-04-18 | End: 2017-04-18 | Stop reason: HOSPADM

## 2017-04-18 RX ORDER — PROPOFOL 10 MG/ML
VIAL (ML) INTRAVENOUS AS NEEDED
Status: DISCONTINUED | OUTPATIENT
Start: 2017-04-18 | End: 2017-04-18 | Stop reason: SURG

## 2017-04-18 RX ORDER — SODIUM CHLORIDE, SODIUM LACTATE, POTASSIUM CHLORIDE, CALCIUM CHLORIDE 600; 310; 30; 20 MG/100ML; MG/100ML; MG/100ML; MG/100ML
9 INJECTION, SOLUTION INTRAVENOUS CONTINUOUS
Status: DISCONTINUED | OUTPATIENT
Start: 2017-04-18 | End: 2017-04-19 | Stop reason: HOSPADM

## 2017-04-18 RX ORDER — METOCLOPRAMIDE HYDROCHLORIDE 5 MG/ML
10 INJECTION INTRAMUSCULAR; INTRAVENOUS EVERY 6 HOURS PRN
Status: DISCONTINUED | OUTPATIENT
Start: 2017-04-18 | End: 2017-04-18

## 2017-04-18 RX ORDER — HYDROCODONE BITARTRATE AND ACETAMINOPHEN 7.5; 325 MG/1; MG/1
1 TABLET ORAL EVERY 4 HOURS PRN
Status: DISCONTINUED | OUTPATIENT
Start: 2017-04-18 | End: 2017-04-19 | Stop reason: HOSPADM

## 2017-04-18 RX ORDER — MIDAZOLAM HYDROCHLORIDE 1 MG/ML
2 INJECTION INTRAMUSCULAR; INTRAVENOUS
Status: DISCONTINUED | OUTPATIENT
Start: 2017-04-18 | End: 2017-04-18 | Stop reason: HOSPADM

## 2017-04-18 RX ORDER — PROMETHAZINE HYDROCHLORIDE 25 MG/1
25 TABLET ORAL ONCE AS NEEDED
Status: DISCONTINUED | OUTPATIENT
Start: 2017-04-18 | End: 2017-04-18 | Stop reason: HOSPADM

## 2017-04-18 RX ORDER — SODIUM CHLORIDE 9 MG/ML
75 INJECTION, SOLUTION INTRAVENOUS CONTINUOUS
Status: DISCONTINUED | OUTPATIENT
Start: 2017-04-18 | End: 2017-04-19 | Stop reason: HOSPADM

## 2017-04-18 RX ORDER — ROCURONIUM BROMIDE 10 MG/ML
INJECTION, SOLUTION INTRAVENOUS AS NEEDED
Status: DISCONTINUED | OUTPATIENT
Start: 2017-04-18 | End: 2017-04-18 | Stop reason: SURG

## 2017-04-18 RX ORDER — FAMOTIDINE 10 MG/ML
20 INJECTION, SOLUTION INTRAVENOUS ONCE
Status: COMPLETED | OUTPATIENT
Start: 2017-04-18 | End: 2017-04-18

## 2017-04-18 RX ORDER — METOPROLOL TARTRATE 50 MG/1
50 TABLET, FILM COATED ORAL EVERY 12 HOURS SCHEDULED
Status: DISCONTINUED | OUTPATIENT
Start: 2017-04-18 | End: 2017-04-18

## 2017-04-18 RX ORDER — NITROGLYCERIN 0.4 MG/1
0.4 TABLET SUBLINGUAL
Status: DISCONTINUED | OUTPATIENT
Start: 2017-04-18 | End: 2017-04-18

## 2017-04-18 RX ORDER — LABETALOL HYDROCHLORIDE 5 MG/ML
5 INJECTION, SOLUTION INTRAVENOUS
Status: DISCONTINUED | OUTPATIENT
Start: 2017-04-18 | End: 2017-04-18 | Stop reason: HOSPADM

## 2017-04-18 RX ORDER — ONDANSETRON 2 MG/ML
4 INJECTION INTRAMUSCULAR; INTRAVENOUS EVERY 6 HOURS PRN
Status: DISCONTINUED | OUTPATIENT
Start: 2017-04-18 | End: 2017-04-18

## 2017-04-18 RX ORDER — SODIUM CHLORIDE 0.9 % (FLUSH) 0.9 %
1-10 SYRINGE (ML) INJECTION AS NEEDED
Status: DISCONTINUED | OUTPATIENT
Start: 2017-04-18 | End: 2017-04-18 | Stop reason: HOSPADM

## 2017-04-18 RX ORDER — HYDROCODONE BITARTRATE AND ACETAMINOPHEN 7.5; 325 MG/1; MG/1
1 TABLET ORAL ONCE AS NEEDED
Status: DISCONTINUED | OUTPATIENT
Start: 2017-04-18 | End: 2017-04-18 | Stop reason: HOSPADM

## 2017-04-18 RX ORDER — NALOXONE HCL 0.4 MG/ML
0.2 VIAL (ML) INJECTION AS NEEDED
Status: DISCONTINUED | OUTPATIENT
Start: 2017-04-18 | End: 2017-04-18 | Stop reason: HOSPADM

## 2017-04-18 RX ORDER — LIDOCAINE HYDROCHLORIDE 20 MG/ML
INJECTION, SOLUTION INFILTRATION; PERINEURAL AS NEEDED
Status: DISCONTINUED | OUTPATIENT
Start: 2017-04-18 | End: 2017-04-18 | Stop reason: SURG

## 2017-04-18 RX ORDER — HYDROMORPHONE HYDROCHLORIDE 1 MG/ML
0.5 INJECTION, SOLUTION INTRAMUSCULAR; INTRAVENOUS; SUBCUTANEOUS
Status: DISCONTINUED | OUTPATIENT
Start: 2017-04-18 | End: 2017-04-18 | Stop reason: HOSPADM

## 2017-04-18 RX ORDER — ACETAMINOPHEN 325 MG/1
650 TABLET ORAL EVERY 4 HOURS PRN
Status: DISCONTINUED | OUTPATIENT
Start: 2017-04-18 | End: 2017-04-18

## 2017-04-18 RX ORDER — FENTANYL CITRATE 50 UG/ML
50 INJECTION, SOLUTION INTRAMUSCULAR; INTRAVENOUS
Status: DISCONTINUED | OUTPATIENT
Start: 2017-04-18 | End: 2017-04-18 | Stop reason: HOSPADM

## 2017-04-18 RX ORDER — FLUMAZENIL 0.1 MG/ML
0.2 INJECTION INTRAVENOUS AS NEEDED
Status: DISCONTINUED | OUTPATIENT
Start: 2017-04-18 | End: 2017-04-18 | Stop reason: HOSPADM

## 2017-04-18 RX ORDER — PROMETHAZINE HYDROCHLORIDE 25 MG/ML
12.5 INJECTION, SOLUTION INTRAMUSCULAR; INTRAVENOUS EVERY 4 HOURS PRN
Status: DISCONTINUED | OUTPATIENT
Start: 2017-04-18 | End: 2017-04-18

## 2017-04-18 RX ORDER — SUCCINYLCHOLINE CHLORIDE 20 MG/ML
INJECTION INTRAMUSCULAR; INTRAVENOUS AS NEEDED
Status: DISCONTINUED | OUTPATIENT
Start: 2017-04-18 | End: 2017-04-18 | Stop reason: SURG

## 2017-04-18 RX ORDER — LIDOCAINE HYDROCHLORIDE AND EPINEPHRINE 10; 10 MG/ML; UG/ML
INJECTION, SOLUTION INFILTRATION; PERINEURAL AS NEEDED
Status: DISCONTINUED | OUTPATIENT
Start: 2017-04-18 | End: 2017-04-18 | Stop reason: HOSPADM

## 2017-04-18 RX ORDER — ZOLPIDEM TARTRATE 5 MG/1
5 TABLET ORAL NIGHTLY PRN
Status: DISCONTINUED | OUTPATIENT
Start: 2017-04-18 | End: 2017-04-18

## 2017-04-18 RX ORDER — PROMETHAZINE HYDROCHLORIDE 25 MG/1
12.5 TABLET ORAL ONCE AS NEEDED
Status: DISCONTINUED | OUTPATIENT
Start: 2017-04-18 | End: 2017-04-18 | Stop reason: HOSPADM

## 2017-04-18 RX ORDER — OXYCODONE AND ACETAMINOPHEN 7.5; 325 MG/1; MG/1
1 TABLET ORAL ONCE AS NEEDED
Status: DISCONTINUED | OUTPATIENT
Start: 2017-04-18 | End: 2017-04-18 | Stop reason: HOSPADM

## 2017-04-18 RX ORDER — ALPRAZOLAM 0.5 MG/1
0.5 TABLET ORAL 2 TIMES DAILY PRN
Status: DISCONTINUED | OUTPATIENT
Start: 2017-04-18 | End: 2017-04-18

## 2017-04-18 RX ORDER — HYDROMORPHONE HYDROCHLORIDE 1 MG/ML
0.5 INJECTION, SOLUTION INTRAMUSCULAR; INTRAVENOUS; SUBCUTANEOUS
Status: DISCONTINUED | OUTPATIENT
Start: 2017-04-18 | End: 2017-04-19 | Stop reason: HOSPADM

## 2017-04-18 RX ADMIN — POTASSIUM CHLORIDE 20 MEQ: 750 CAPSULE, EXTENDED RELEASE ORAL at 12:36

## 2017-04-18 RX ADMIN — FAMOTIDINE 20 MG: 10 INJECTION, SOLUTION INTRAVENOUS at 07:35

## 2017-04-18 RX ADMIN — METOPROLOL TARTRATE 50 MG: 50 TABLET ORAL at 04:11

## 2017-04-18 RX ADMIN — PANTOPRAZOLE SODIUM 40 MG: 40 TABLET, DELAYED RELEASE ORAL at 05:43

## 2017-04-18 RX ADMIN — SODIUM CHLORIDE 75 ML/HR: 9 INJECTION, SOLUTION INTRAVENOUS at 12:09

## 2017-04-18 RX ADMIN — RIVAROXABAN 20 MG: 20 TABLET, FILM COATED ORAL at 17:55

## 2017-04-18 RX ADMIN — FENTANYL CITRATE 100 MCG: 50 INJECTION INTRAMUSCULAR; INTRAVENOUS at 08:10

## 2017-04-18 RX ADMIN — SUCCINYLCHOLINE CHLORIDE 200 MG: 20 INJECTION, SOLUTION INTRAMUSCULAR; INTRAVENOUS; PARENTERAL at 08:09

## 2017-04-18 RX ADMIN — SUGAMMADEX 5 ML: 100 INJECTION, SOLUTION INTRAVENOUS at 09:22

## 2017-04-18 RX ADMIN — PROPOFOL 100 MG: 10 INJECTION, EMULSION INTRAVENOUS at 08:10

## 2017-04-18 RX ADMIN — ATORVASTATIN CALCIUM 40 MG: 40 TABLET, FILM COATED ORAL at 20:00

## 2017-04-18 RX ADMIN — PHENYLEPHRINE HYDROCHLORIDE 100 MCG: 10 INJECTION INTRAVENOUS at 08:20

## 2017-04-18 RX ADMIN — HYDROCODONE BITARTRATE AND ACETAMINOPHEN 1 TABLET: 7.5; 325 TABLET ORAL at 12:36

## 2017-04-18 RX ADMIN — LIDOCAINE HYDROCHLORIDE 100 MG: 20 INJECTION, SOLUTION INFILTRATION; PERINEURAL at 08:10

## 2017-04-18 RX ADMIN — ONDANSETRON 4 MG: 2 INJECTION INTRAMUSCULAR; INTRAVENOUS at 08:44

## 2017-04-18 RX ADMIN — HYDROCODONE BITARTRATE AND ACETAMINOPHEN 1 TABLET: 7.5; 325 TABLET ORAL at 19:51

## 2017-04-18 RX ADMIN — ROCURONIUM BROMIDE 5 MG: 10 INJECTION INTRAVENOUS at 08:07

## 2017-04-18 RX ADMIN — SODIUM CHLORIDE, POTASSIUM CHLORIDE, SODIUM LACTATE AND CALCIUM CHLORIDE: 600; 310; 30; 20 INJECTION, SOLUTION INTRAVENOUS at 08:04

## 2017-04-18 RX ADMIN — METOPROLOL TARTRATE 25 MG: 50 TABLET ORAL at 20:00

## 2017-04-18 RX ADMIN — ROCURONIUM BROMIDE 45 MG: 10 INJECTION INTRAVENOUS at 08:30

## 2017-04-18 RX ADMIN — MIDAZOLAM HYDROCHLORIDE 2 MG: 1 INJECTION, SOLUTION INTRAMUSCULAR; INTRAVENOUS at 08:05

## 2017-04-18 RX ADMIN — ASPIRIN 81 MG: 81 TABLET ORAL at 12:36

## 2017-04-18 NOTE — PLAN OF CARE
Problem: Inpatient Physical Therapy  Goal: Cardiopulmonary Goal LTG- PT  Outcome: Outcome(s) achieved Date Met:  04/18/17 04/18/17 1532   Cardiopulmonary PT LTG   Cardiopulmonary PT LTG, Level Level V   Cardiopulmonary PT LTG, Outcome goal met

## 2017-04-18 NOTE — PROGRESS NOTES
LOS: 11 days   Patient Care Team:  Damian Chen MD as PCP - General (Family Medicine)  Damian Chen MD as PCP - Claims Attributed - PLEASE DO NOT REMOVE  Temo Cortez MD as Surgeon (Cardiothoracic Surgery)    Chief Complaint: Follow-up for CAD, paroxysmal typical atrial flutter.    Interval History: Remains in atrial flutter.  No CP or SOA.     Vital Signs:  Temp:  [97.7 °F (36.5 °C)-98.8 °F (37.1 °C)] 98.8 °F (37.1 °C)  Heart Rate:  [] 117  Resp:  [16-20] 16  BP: ()/(59-85) 103/76    Intake/Output Summary (Last 24 hours) at 04/18/17 0641  Last data filed at 04/18/17 0457   Gross per 24 hour   Intake                0 ml   Output             1000 ml   Net            -1000 ml       Physical Exam:   General Appearance:    No acute distress, alert and oriented x4   Lungs:     Clear to auscultation bilaterally     Heart:    Regular rhythm and tachycardic rate.  No murmurs, gallops,    or rubs.   Abdomen:     Soft, non-tender, non-distended.    Extremities:   Moves all extremities well.  No clubbing, cyanosis, or edema.     Results Review:      Results from last 7 days  Lab Units 04/18/17  0419   SODIUM mmol/L 137   POTASSIUM mmol/L 4.0   CHLORIDE mmol/L 97*   TOTAL CO2 mmol/L 24.6   BUN mg/dL 11   CREATININE mg/dL 0.86   GLUCOSE mg/dL 110*   CALCIUM mg/dL 9.0           Results from last 7 days  Lab Units 04/18/17  0419   WBC 10*3/mm3 9.84   HEMOGLOBIN g/dL 13.5*   HEMATOCRIT % 41.4   PLATELETS 10*3/mm3 444       Results from last 7 days  Lab Units 04/18/17  0419 04/17/17  0343 04/16/17  0436  04/11/17  1059   INR  1.65* 1.21* 1.15*  < > 1.54*   APTT seconds  --   --   --   --  33.3   < > = values in this interval not displayed.        Results from last 7 days  Lab Units 04/12/17  0313   MAGNESIUM mg/dL 2.5*           I reviewed the patient's new clinical results.        Assessment:  1. Unstable angina with severe CAD (left main) - status post 3 vessel CABG 4/11/17  2. Paroxysmal atrial flutter  (typcial)  3. Hypertension  4. Hyperlipidemia  5. PVD, s/p CEA  6. Pre-op EF 55-60%    Plan:  -Atrial flutter ablation today with Dr. Gustafson.  -Continue Coumadin 7.5 mg per day - will need anticoagulation for at least one month post-ablation.  -Continue Lipitor and ASA.  -Will discontinue Amio gtt and Digoxin  -Decrease metoprolol to 50 bid in anticipation of ablation.  -Possibly home tomorrow AM.      Eric Wright MD  04/18/17  6:41 AM

## 2017-04-18 NOTE — ANESTHESIA POSTPROCEDURE EVALUATION
Patient: Madhu Santoro    Procedure Summary     Date Anesthesia Start Anesthesia Stop Room / Location    04/18/17 0804 0935 [facility] CATH/EP LAB 5 /  [facility] INVASIVE [facility]       Procedure Diagnosis Provider Provider    Ablation atrial flutter (N/A ) (aflutter) MD Jordon Hankins MD          Anesthesia Type: general  Last vitals  /80 (04/18/17 1035)    Temp      Pulse 77 (04/18/17 1035)   Resp 20 (04/18/17 1035)    SpO2 95 % (04/18/17 1035)      Post Anesthesia Care and Evaluation    Patient location during evaluation: bedside  Patient participation: complete - patient participated  Level of consciousness: awake  Pain management: adequate  Airway patency: patent  Anesthetic complications: No anesthetic complications    Cardiovascular status: acceptable  Respiratory status: acceptable  Hydration status: acceptable

## 2017-04-18 NOTE — ANESTHESIA PROCEDURE NOTES
Airway  Date/Time: 4/18/2017 8:18 AM  End Time:4/18/2017 8:18 AM  Airway not difficult    General Information and Staff    Patient location during procedure: radiology (cath lab)  Anesthesiologist: REJI WESTFALL    Indications and Patient Condition  Indications for airway management: airway protection    Preoxygenated: yes  Mask difficulty assessment: 1 - vent by mask    Final Airway Details  Final airway type: endotracheal airway      Successful airway: ETT  Cuffed: yes   Successful intubation technique: direct laryngoscopy  Facilitating devices/methods: intubating stylet  Endotracheal tube insertion site: oral  Blade: Aburto  Blade size: #2  ETT size: 8.0 mm  Cormack-Lehane Classification: grade IIa - partial view of glottis  Placement verified by: chest auscultation and capnometry   Number of attempts at approach: 1

## 2017-04-18 NOTE — PROGRESS NOTES
Acute Care - Speech Language Pathology   Swallow Re-Evaluation [facility]     Patient Name: Madhu Santoro  : 1945  MRN: [id number]  Today's Date: 2017  Onset of Illness/Injury or Date of Surgery Date: 17            Admit Date: 2017    SPEECH-LANGUAGE PATHOLOGY EVALUATION - SWALLOW  Subjective: The patient was seen on this date for a Clinical Swallow evaluation.  Patient was alert and cooperative.    The patient is seen for re-eval this date to determine appropriateness of upgrade to thin liquids.  Pt had AR yesterday and ablation this AM.  RN reported that pt has been drinking thin liquids for four days despite recommended diet of NTL.  Objective: Textures given included thin liquid.  Assessment: No overt s/s of aspiration observed with thin liquid.  As swallow appears WFL, feel pt is appropriate for upgrade to thin liquids.  SLP Findings:  Patient presents with functional swallow, without esophageal component.   Recommendations: Diet Textures: thin liquid, regular consistency food.  Medications should be taken whole with thin liquids.   Recommended Strategies: Upright for PO. Oral care before breakfast, after all meals and PRN.  Other Recommended Evaluations: None unless indicated per MD.    Dysphagia therapy is not recommended. Rationale: Swallow WFL.  Visit Dx:     ICD-10-CM ICD-9-CM   1. New onset atrial flutter I48.92 427.32   2. Coronary artery disease involving native coronary artery of native heart without angina pectoris I25.10 414.01   3. Generalized weakness R53.1 780.79   4. S/P CABG x 3 Z95.1 V45.81     Patient Active Problem List   Diagnosis   • Anxiety   • Arthritis   • CAD (coronary artery disease)   • HLD (hyperlipidemia)   • Benign essential hypertension   • DDD (degenerative disc disease), lumbosacral   • Stroke   • Screening for prostate cancer   • Hypertension   • Gastroesophageal reflux disease   • Hyperlipidemia   • New onset atrial flutter     Past Medical  History:   Diagnosis Date   • Anxiety    • Arthritis    • Benign essential hypertension    • CAD (coronary artery disease)    • Chest pain    • Colonic polyp    • DDD (degenerative disc disease), lumbosacral    • H/O bone density study 2013   • H/O complete eye exam 2014   • HLD (hyperlipidemia)    • Hypertension    • Lipid screening 05/31/2013   • Low back pain     [facility]   • Screening for prostate cancer 07/07/2015   • Stroke      Past Surgical History:   Procedure Laterality Date   • CARDIAC CATHETERIZATION N/A 4/10/2017    Procedure: Left Heart Cath;  Surgeon: Marjorie Healy MD;  Location: [facility] CATH INVASIVE LOCATION;  Service:    • CARDIAC CATHETERIZATION N/A 4/10/2017    Procedure: Coronary angiography;  Surgeon: Marjorie Healy MD;  [facility];  Service:    • CARDIAC CATHETERIZATION N/A 4/10/2017    Procedure: Left ventriculography;  Surgeon: Marjorie Healy MD;  Location: [facility] CATH INVASIVE LOCATION;  Service:    • COLONOSCOPY  2010   • CORONARY ARTERY BYPASS GRAFT N/A 4/11/2017    Procedure: AR STERNOTOMY CORONARY ARTERY BYPASS GRAFT TIMES 3 USING LEFT INTERNAL MAMMARY ARTERY AND LEFT GREATER SAPHENOUS VEIN GRAFT PER ENDOSCOPIC VEIN HARVESTING AND PRP ;  Surgeon: Temo Cortez MD;  Location: [facility] OR;  Service:           SWALLOW EVALUATION (last 72 hours)      Swallow Evaluation       04/18/17 1400                Rehab Evaluation    Document Type (P)  re-evaluation  -LD        General Information    Patient Profile Review (P)  yes  -LD        Current Diet Limitations (P)  regular solid;nectar thick liquids  -LD        Plans/Goals Discussed With (P)  patient;spouse/S.O.  -LD        Barriers to Rehab (P)  none identified  -LD        Clinical Impression    Patient's Goals For Discharge (P)  patient did not state  -LD        SLP Swallowing Diagnosis (P)  other (see comments)   WFL  -LD        Criteria for Skilled Therapeutic  Interventions Met (P)  no problems identified which require skilled intervention  -LD        FCM, Swallowing (P)  7-->Level 7  -LD        SLP Diet Recommendation (P)  regular textures;thin liquids  -LD        Recommended Feeding/Eating Techniques (P)  maintain upright posture during/after eating for 30 mins;small sips/bites  -LD        SLP Rec. for Method of Medication Administration (P)  meds whole with thin liquid  -LD        Monitor For Signs Of Aspiration (P)  cough;gurgly voice;throat clearing  -LD        Anticipated Discharge Disposition (P)  home  -LD        Cognitive Assessment/Intervention    Current Cognitive/Communication Assessment (P)  functional  -LD        Oral Motor Structure and Function    Oral Motor Anatomy and Physiology (P)  patient demonstrates anatomy and physiology that is WNL  -LD        Oral Musculature General Assessment (P)  WFL (within functional limits)  -LD        Oral Motor Assessment Comment (P)  WFL  -LD          User Key  (r) = Recorded By, (t) = Taken By, (c) = Cosigned By    Initials Name Effective Dates    LD Summer Marie, Speech Therapy Student 12/28/16 -         EDUCATION  The patient has been educated in the following areas:   Dysphagia (Swallowing Impairment).    SLP Recommendation and Plan  SLP Swallowing Diagnosis: (P) other (see comments) (WFL)  SLP Diet Recommendation: (P) regular textures, thin liquids  Recommended Feeding/Eating Techniques: (P) maintain upright posture during/after eating for 30 mins, small sips/bites  SLP Rec. for Method of Medication Administration: (P) meds whole with thin liquid  Monitor For Signs Of Aspiration: (P) cough, gurgly voice, throat clearing     Criteria for Skilled Therapeutic Interventions Met: (P) no problems identified which require skilled intervention  Anticipated Discharge Disposition: (P) home                   Plan of Care Review  Plan Of Care Reviewed With: (P) patient          IP SLP Goals       04/18/17 1420 04/13/17 6691        Safely Consume Diet    Safely Consume Diet- SLP, Date Established  04/13/17  -NB     Safely Consume Diet- SLP, Time to Achieve  by discharge  -NB     Safely Consume Diet- SLP, Additional Goal  nectar thick w/ regular  -NB     Safely Consume Diet- SLP, Date Goal Reviewed  04/13/17  -NB     Safely Consume Diet- SLP, Outcome (P)  goal met  -LD goal revised  -NB       User Key  (r) = Recorded By, (t) = Taken By, (c) = Cosigned By    Initials Name Provider Type    ROBERT Handy MS CCC-SLP Speech and Language Pathologist    BAIRON Marie, Speech Therapy Student Speech Therapy Student             SLP Outcome Measures (last 72 hours)      SLP Outcome Measures       04/18/17 1400          SLP Outcome Measures    Outcome Measure Used? (P)  Adult NOMS  -LD      FCM Scores    FCM Chosen (P)  Swallowing  -LD      Swallowing FCM Score (P)  7  -LD        User Key  (r) = Recorded By, (t) = Taken By, (c) = Cosigned By    Initials Name Effective Dates    BAIRON Marie Speech Therapy Student 12/28/16 -            Time Calculation:         Time Calculation- SLP       04/18/17 1421          Time Calculation- SLP    SLP Start Time (P)  1330  -LD      SLP Stop Time (P)  1415  -LD      SLP Time Calculation (min) (P)  45 min  -LD        User Key  (r) = Recorded By, (t) = Taken By, (c) = Cosigned By    Initials Name Provider Type    BAIRON Marie Speech Therapy Student Speech Therapy Student          Therapy Charges for Today     Code Description Service Date Service Provider Modifiers Qty    [id number] HC ST EVAL ORAL PHARYNG SWALLOW 3 4/18/2017 Summer Marie Speech Therapy Student GN 1               Summer Marie Speech Therapy Student  4/18/2017

## 2017-04-18 NOTE — THERAPY DISCHARGE NOTE
Acute Care - Physical Therapy Treatment Note/Discharge  [facility]     Patient Name: Madhu Santoro  : 1945  MRN: [id number]  Today's Date: 2017  Onset of Illness/Injury or Date of Surgery Date: 17          Admit Date: 2017    Visit Dx:    ICD-10-CM ICD-9-CM   1. New onset atrial flutter I48.92 427.32   2. Coronary artery disease involving native coronary artery of native heart without angina pectoris I25.10 414.01   3. Generalized weakness R53.1 780.79   4. S/P CABG x 3 Z95.1 V45.81     Patient Active Problem List   Diagnosis   • Anxiety   • Arthritis   • CAD (coronary artery disease)   • HLD (hyperlipidemia)   • Benign essential hypertension   • DDD (degenerative disc disease), lumbosacral   • Stroke   • Screening for prostate cancer   • Hypertension   • Gastroesophageal reflux disease   • Hyperlipidemia   • New onset atrial flutter       Physical Therapy Education     Title: PT OT SLP Therapies (Done)     Topic: Physical Therapy (Resolved)     Point: Mobility training (Resolved)    Learning Progress Summary    Learner Readiness Method Response Comment Documented by Status   Patient Acceptance E VU  RW 17 1532 Done    Acceptance E,D ANNA AVILA   17 1524 Done    Acceptance E DU  MM 17 0924 Done    Acceptance E,D MARGARITA CASTILLO   17 0936 Done    Acceptance E NR   17 1013 Active    Acceptance E NR  EM 17 0857 Active   Family Acceptance E,D MARGARITA CASTILLO   17 0936 Done    Acceptance E NR   17 1013 Active               Point: Home exercise program (Resolved)    Learning Progress Summary    Learner Readiness Method Response Comment Documented by Status   Patient Acceptance E VU  RW 17 1532 Done    Acceptance E NR  EM 17 0857 Active               Point: Body mechanics (Resolved)    Learning Progress Summary    Learner Readiness Method Response Comment Documented by Status   Patient Acceptance E VU  RW 17 1532 Done    Acceptance E,D ANNA AVILA   PC 04/17/17 1524 Done    Acceptance E DU  MM 04/15/17 0924 Done               Point: Precautions (Resolved)    Learning Progress Summary    Learner Readiness Method Response Comment Documented by Status   Patient Acceptance E VU  RW 04/18/17 1532 Done                      User Key     Initials Effective Dates Name Provider Type Discipline     12/01/15 -  Naomi Tucker, PT Physical Therapist PT    EM 12/01/15 -  Susan Hurtado, PT Physical Therapist PT    MM 02/18/16 -  Yandy Brower PTA Physical Therapy Assistant PT    RW 04/06/16 -  Richa Antonio PTA Physical Therapy Assistant PT     01/31/17 -  Miguel Stephenson, PT Student PT Student PT                    IP PT Goals       04/18/17 1532 04/12/17 0858       Cardiopulmonary PT LTG    Cardiopulmonary PT LTG, Date Established  04/12/17  -EM     Cardiopulmonary PT LTG, Time to Achieve  1 wk  -EM     Cardiopulmonary PT LTG, Level Level V  -RW Level V  -EM     Cardiopulmonary PT LTG, Outcome goal met  -        User Key  (r) = Recorded By, (t) = Taken By, (c) = Cosigned By    Initials Name Provider Type    EM Susan Hurtado, PT Physical Therapist    RW Richa Antonio PTA Physical Therapy Assistant              Adult Rehabilitation Note       04/18/17 1500 04/17/17 1500 04/16/17 0921    Rehab Assessment/Intervention    Discipline physical therapy assistant  -RW physical therapist  -PC physical therapy assistant  -MM    Document Type therapy note (daily note);discharge summary  -RW therapy note (daily note)  -PC therapy note (daily note)  -MM    Subjective Information agree to therapy  -RW  no complaints;agree to therapy  -MM    Patient Effort, Rehab Treatment good  -RW  good  -MM    Symptoms Noted During/After Treatment   none  -MM    Precautions/Limitations fall precautions;cardiac precautions  -RW  cardiac precautions;fall precautions  -MM    Recorded by [RW] Richa Antonio PTA [PC] Naomi Tucker, PT [MM] Yandy Brower PTA    Pain Assessment     Pain Assessment 0-10  -RW No/denies pain  -PC No/denies pain  -MM    Pain Score 5  -RW      Pain Type Acute pain  -RW      Pain Location Generalized  -RW      Pain Intervention(s) Repositioned;Ambulation/increased activity  -RW      Response to Interventions tolerated  -RW      Recorded by [RW] Richa Antonio PTA [PC] Naomi Tucker, PT [MM] Yandy Brower, FREDY    Cognitive Assessment/Intervention    Current Cognitive/Communication Assessment functional  -RW functional  -PC     Orientation Status oriented x 4  -RW      Follows Commands/Answers Questions 100% of the time  -RW      Personal Safety WNL/WFL  -RW      Personal Safety Interventions fall prevention program maintained;nonskid shoes/slippers when out of bed  -RW      Recorded by [RW] Richa Antonio PTA [PC] Naomi Tucker, SHANELL     Bed Mobility, Assessment/Treatment    Bed Mob, Supine to Sit, Goodman not tested  -RW  not tested  -MM    Bed Mob, Sit to Supine, Goodman not tested  -RW  not tested  -MM    Bed Mobility, Comment Pt up in chair  -RW in chair  -PC up in chair  -MM    Recorded by [RW] Richa Antonio PTA [PC] Naomi Tucker, PT [MM] Yandy Brower PTA    Transfer Assessment/Treatment    Transfers, Sit-Stand Goodman supervision required  -RW supervision required  -PC supervision required  -MM    Transfers, Stand-Sit Goodman supervision required  -RW supervision required  -PC supervision required  -MM    Recorded by [RW] Richa Antonio PTA [PC] Naomi Tucker, PT [MM] Yandy Brower PTA    Gait Assessment/Treatment    Gait, Goodman Level supervision required  -RW supervision required  -PC stand by assist  -MM    Gait, Distance (Feet) 500  -  -  -MM    Gait, Gait Deviations forward flexed posture  -RW      Gait, Comment  smooth gait pattern, normal jemal, did not attempt stairs due to IV pole, pt will have ablation tomorrow  -PC pt still has IV; pt stated he is scheduled for a procedure tomorrow.  -MM     Recorded by [RW] Richa Antonio PTA [PC] Naomi Tucker, PT [MM] Yandy Brower PTA    Stairs Assessment/Treatment    Number of Stairs 8  -RW      Stairs, Handrail Location right side (ascending)  -RW      Stairs, [place] Level verbal cues required;contact guard assist  -RW      Stairs, Technique Used step over step (ascending);step to step (descending)  -RW      Recorded by [RW] Richa Antonio PTA      Therapy Exercises    Exercise Protocols --   cardiac protocol x5 reps  -RW --   level IV cardiac protocol  -PC     Recorded by [RW] Richa Antonio PTA [PC] Naomi Tucker PT     Positioning and Restraints    Pre-Treatment Position sitting in chair/recliner  -RW sitting in chair/recliner  -PC sitting in chair/recliner  -MM    Post Treatment Position chair  -RW chair  -PC chair  -MM    In Chair sitting;call light within reach;encouraged to call for assist  -RW reclined;call light within reach;encouraged to call for assist  -PC reclined;call light within reach;encouraged to call for assist  -MM    Recorded by [RW] Ricah Antonio, FREDY [PC] Naomi Tucker, PT [MM] Yandy Brower PTA      User Key  (r) = Recorded By, (t) = Taken By, (c) = Cosigned By    Initials Name Effective Dates    PC Naomi Tucker, PT 12/01/15 -     MM Yandy Brower PTA 02/18/16 -     RW Richa Antonio PTA 04/06/16 -           PT Recommendation and Plan  Anticipated Discharge Disposition: home with assist  Planned Therapy Interventions: bed mobility training, gait training, home exercise program  PT Frequency: daily  Plan of Care Review  Plan Of Care Reviewed With: patient  Outcome Summary/Follow up Plan: Pt has met goals w/ PT at this time. No questions for PT and plans to d/c home tomorrow am.           Outcome Measures       04/18/17 1500 04/17/17 1500 04/16/17 0900    How much help from another person do you currently need...    Turning from your back to your side while in flat bed without using bedrails? 4  -RW 4  -PC 3   -MM    Moving from lying on back to sitting on the side of a flat bed without bedrails? 4  -RW 4  -PC 3  -MM    Moving to and from a bed to a chair (including a wheelchair)? 4  -RW 4  -PC 4  -MM    Standing up from a chair using your arms (e.g., wheelchair, bedside chair)? 4  -RW 4  -PC 4  -MM    Climbing 3-5 steps with a railing? 3  -RW 3  -PC 3  -MM    To walk in hospital room? 4  -RW 4  -PC 4  -MM    AM-PAC 6 Clicks Score 23  -RW 23  -PC 21  -MM    Functional Assessment    Outcome Measure Options AM-PAC 6 Clicks Basic Mobility (PT)  -RW        User Key  (r) = Recorded By, (t) = Taken By, (c) = Cosigned By    Initials Name Provider Type    PC Naomi Tucker, PT Physical Therapist    MM Yandy Brower, FREDY Physical Therapy Assistant    PRIMITIVO Antonio PTA Physical Therapy Assistant           Time Calculation:         PT Charges       04/18/17 1528          Time Calculation    Start Time 1510  -RW      Stop Time 1528  -RW      Time Calculation (min) 18 min  -RW      PT Received On 04/18/17  -        User Key  (r) = Recorded By, (t) = Taken By, (c) = Cosigned By    Initials Name Provider Type     Richa Antonio PTA Physical Therapy Assistant          Therapy Charges for Today     Code Description Service Date Service Provider Modifiers Qty    [id number] HC PT THER PROC EA 15 MIN 4/18/2017 Richa Antonio PTA GP 1          PT G-Codes  Outcome Measure Options: AM-PAC 6 Clicks Basic Mobility (PT)    PT Discharge Summary  Anticipated Discharge Disposition: home with assist  Reason for Discharge: All goals achieved  Outcomes Achieved: Refer to plan of care for updates on goals achieved  Discharge Destination: Home with assist    Richa Antonio PTA  4/18/2017

## 2017-04-18 NOTE — PLAN OF CARE
Problem: Patient Care Overview (Adult)  Goal: Plan of Care Review  Outcome: Ongoing (interventions implemented as appropriate)    04/18/17 1102   Coping/Psychosocial Response Interventions   Plan Of Care Reviewed With patient   Patient Care Overview   Progress improving   Outcome Evaluation   Outcome Summary/Follow up Plan ready for transfer       Goal: Adult Individualization and Mutuality  Outcome: Ongoing (interventions implemented as appropriate)  Goal: Discharge Needs Assessment  Outcome: Ongoing (interventions implemented as appropriate)    Problem: Perioperative Period (Adult)  Goal: Signs and Symptoms of Listed Potential Problems Will be Absent or Manageable (Perioperative Period)  Outcome: Ongoing (interventions implemented as appropriate)

## 2017-04-18 NOTE — ANESTHESIA PREPROCEDURE EVALUATION
Anesthesia Evaluation     Patient summary reviewed and Nursing notes reviewed   NPO Status: > 8 hours   Airway   Mallampati: II  Neck ROM: full  no difficulty expected  Dental    (+) lower dentures and upper dentures    Comment: partials    Pulmonary     breath sounds clear to auscultation  (+) a smoker Former,   Cardiovascular     Rhythm: regular  Rate: abnormal    (+) hypertension, CAD, CABG <1 Month, dysrhythmias Atrial Flutter,       Neuro/Psych  (+) CVA, psychiatric history,    GI/Hepatic/Renal/Endo    (+)  GERD,     Musculoskeletal     Abdominal    Substance History      OB/GYN          Other   (+) arthritis                                 Anesthesia Plan    ASA 4     general     intravenous induction   Anesthetic plan and risks discussed with patient.

## 2017-04-18 NOTE — PLAN OF CARE
Problem: Patient Care Overview (Adult)  Goal: Plan of Care Review  Outcome: Ongoing (interventions implemented as appropriate)    04/18/17 1532   Coping/Psychosocial Response Interventions   Plan Of Care Reviewed With patient   Outcome Evaluation   Outcome Summary/Follow up Plan Pt has met goals w/ PT at this time. No questions for PT and plans to d/c home tomorrow am.

## 2017-04-18 NOTE — PLAN OF CARE
Problem: Patient Care Overview (Adult)  Goal: Plan of Care Review  Outcome: Ongoing (interventions implemented as appropriate)    04/18/17 0455   Coping/Psychosocial Response Interventions   Plan Of Care Reviewed With patient   Patient Care Overview   Progress progress toward functional goals as expected   Outcome Evaluation   Outcome Summary/Follow up Plan VSS, no complaints of pain. NPO since midnight for scheduled ablation today. Will continue to monitor closely.        Goal: Adult Individualization and Mutuality  Outcome: Ongoing (interventions implemented as appropriate)  Goal: Discharge Needs Assessment  Outcome: Ongoing (interventions implemented as appropriate)    Problem: Cardiac Surgery (Adult)  Goal: Signs and Symptoms of Listed Potential Problems Will be Absent or Manageable (Cardiac Surgery)  Outcome: Ongoing (interventions implemented as appropriate)    04/18/17 0455   Cardiac Surgery   Problems Assessed (Cardiac Surgery) all   Problems Present (Cardiac Surgery) dysrhythmia/arrhythmia         Problem: Fall Risk (Adult)  Goal: Absence of Falls  Outcome: Ongoing (interventions implemented as appropriate)    04/18/17 0455   Fall Risk (Adult)   Absence of Falls making progress toward outcome         Problem: Skin Integrity Impairment, Risk/Actual (Adult)  Goal: Skin Integrity/Wound Healing  Outcome: Ongoing (interventions implemented as appropriate)    04/18/17 0455   Skin Integrity Impairment, Risk/Actual (Adult)   Skin Integrity/Wound Healing making progress toward outcome

## 2017-04-18 NOTE — PLAN OF CARE
Problem: Patient Care Overview (Adult)  Goal: Plan of Care Review    04/18/17 1420   Coping/Psychosocial Response Interventions   Plan Of Care Reviewed With patient         Problem: Inpatient SLP  Goal: Dysphagia- Patient will safely consume diet as per recommendation with no signs/symptoms of aspiration    04/13/17 1154 04/18/17 1420   Safely Consume Diet   Safely Consume Diet- SLP, Date Established 04/13/17 --    Safely Consume Diet- SLP, Time to Achieve by discharge --    Safely Consume Diet- SLP, Outcome --  goal met

## 2017-04-19 VITALS
HEIGHT: 68 IN | DIASTOLIC BLOOD PRESSURE: 75 MMHG | SYSTOLIC BLOOD PRESSURE: 139 MMHG | TEMPERATURE: 98 F | BODY MASS INDEX: 27.71 KG/M2 | RESPIRATION RATE: 16 BRPM | HEART RATE: 70 BPM | WEIGHT: 182.8 LBS | OXYGEN SATURATION: 93 %

## 2017-04-19 LAB
ANION GAP SERPL CALCULATED.3IONS-SCNC: 12.6 MMOL/L
BUN BLD-MCNC: 11 MG/DL (ref 8–23)
BUN/CREAT SERPL: 12.8 (ref 7–25)
CALCIUM SPEC-SCNC: 8.6 MG/DL (ref 8.6–10.5)
CHLORIDE SERPL-SCNC: 99 MMOL/L (ref 98–107)
CO2 SERPL-SCNC: 24.4 MMOL/L (ref 22–29)
CREAT BLD-MCNC: 0.86 MG/DL (ref 0.76–1.27)
GFR SERPL CREATININE-BSD FRML MDRD: 87 ML/MIN/1.73
GLUCOSE BLD-MCNC: 116 MG/DL (ref 65–99)
INR PPP: 4.2 (ref 0.9–1.1)
INR PPP: 8.67 (ref 0.9–1.1)
POTASSIUM BLD-SCNC: 4.1 MMOL/L (ref 3.5–5.2)
PROTHROMBIN TIME: 39.3 SECONDS (ref 11.7–14.2)
PROTHROMBIN TIME: 69.2 SECONDS (ref 11.7–14.2)
SODIUM BLD-SCNC: 136 MMOL/L (ref 136–145)

## 2017-04-19 PROCEDURE — 99239 HOSP IP/OBS DSCHRG MGMT >30: CPT | Performed by: INTERNAL MEDICINE

## 2017-04-19 PROCEDURE — 99024 POSTOP FOLLOW-UP VISIT: CPT | Performed by: NURSE PRACTITIONER

## 2017-04-19 PROCEDURE — 93005 ELECTROCARDIOGRAM TRACING: CPT | Performed by: INTERNAL MEDICINE

## 2017-04-19 PROCEDURE — 80048 BASIC METABOLIC PNL TOTAL CA: CPT | Performed by: THORACIC SURGERY (CARDIOTHORACIC VASCULAR SURGERY)

## 2017-04-19 PROCEDURE — 85610 PROTHROMBIN TIME: CPT | Performed by: THORACIC SURGERY (CARDIOTHORACIC VASCULAR SURGERY)

## 2017-04-19 PROCEDURE — 36430 TRANSFUSION BLD/BLD COMPNT: CPT

## 2017-04-19 PROCEDURE — 30233K1 TRANSFUSION OF NONAUTOLOGOUS FROZEN PLASMA INTO PERIPHERAL VEIN, PERCUTANEOUS APPROACH: ICD-10-PCS | Performed by: INTERNAL MEDICINE

## 2017-04-19 PROCEDURE — 85610 PROTHROMBIN TIME: CPT | Performed by: INTERNAL MEDICINE

## 2017-04-19 PROCEDURE — 93010 ELECTROCARDIOGRAM REPORT: CPT | Performed by: INTERNAL MEDICINE

## 2017-04-19 PROCEDURE — 25010000002 VITAMIN K1 1 MG/1 ML SOLUTION: Performed by: INTERNAL MEDICINE

## 2017-04-19 PROCEDURE — P9017 PLASMA 1 DONOR FRZ W/IN 8 HR: HCPCS

## 2017-04-19 PROCEDURE — 86927 PLASMA FRESH FROZEN: CPT

## 2017-04-19 RX ORDER — HYDROCODONE BITARTRATE AND ACETAMINOPHEN 7.5; 325 MG/1; MG/1
2 TABLET ORAL EVERY 4 HOURS PRN
Qty: 60 TABLET | Refills: 0 | Status: SHIPPED | OUTPATIENT
Start: 2017-04-19 | End: 2017-04-22

## 2017-04-19 RX ORDER — FUROSEMIDE 40 MG/1
20 TABLET ORAL DAILY
Qty: 30 TABLET | Refills: 11 | Status: SHIPPED | OUTPATIENT
Start: 2017-04-19 | End: 2017-05-26 | Stop reason: ALTCHOICE

## 2017-04-19 RX ORDER — PHYTONADIONE 2 MG/ML
5 INJECTION, EMULSION INTRAMUSCULAR; INTRAVENOUS; SUBCUTANEOUS ONCE
Status: COMPLETED | OUTPATIENT
Start: 2017-04-19 | End: 2017-04-19

## 2017-04-19 RX ORDER — POTASSIUM CHLORIDE 750 MG/1
10 CAPSULE, EXTENDED RELEASE ORAL DAILY
Qty: 30 CAPSULE | Refills: 11 | Status: SHIPPED | OUTPATIENT
Start: 2017-04-19 | End: 2017-05-26 | Stop reason: ALTCHOICE

## 2017-04-19 RX ADMIN — POTASSIUM CHLORIDE 20 MEQ: 750 CAPSULE, EXTENDED RELEASE ORAL at 11:30

## 2017-04-19 RX ADMIN — PANTOPRAZOLE SODIUM 40 MG: 40 TABLET, DELAYED RELEASE ORAL at 06:45

## 2017-04-19 RX ADMIN — PHYTONADIONE 5 MG: 1 INJECTION, EMULSION INTRAMUSCULAR; INTRAVENOUS; SUBCUTANEOUS at 09:45

## 2017-04-19 RX ADMIN — METOPROLOL TARTRATE 25 MG: 50 TABLET ORAL at 11:30

## 2017-04-19 RX ADMIN — HYDROCODONE BITARTRATE AND ACETAMINOPHEN 1 TABLET: 7.5; 325 TABLET ORAL at 06:45

## 2017-04-19 RX ADMIN — ASPIRIN 81 MG: 81 TABLET ORAL at 11:30

## 2017-04-19 RX ADMIN — FUROSEMIDE 40 MG: 40 TABLET ORAL at 11:30

## 2017-04-19 NOTE — DISCHARGE INSTR - ACTIVITY
No driving for 2 weeks and while no longer taking narcotics.    No lifting, pushing, or pulling >10 lbs.    Exercise for 30 minutes a day.    Continue to wear compression stockings for the next 2 weeks. May remove at night.    Continue to use incentive spirometer.    Clean incision daily with soap and water. Monitor for signs of infection (redness, swelling, drainage, odor).

## 2017-04-19 NOTE — PROGRESS NOTES
" LOS: 12 days   Patient Care Team:  Damian Chen MD as PCP - General (Family Medicine)  Damian Chen MD as PCP - Claims Attributed - PLEASE DO NOT REMOVE  Temo Cortez MD as Surgeon (Cardiothoracic Surgery)    Chief Complaint: f/u post op    Subjective:  Symptoms:  No shortness of breath.    Diet:  Adequate intake.  No nausea or vomiting.    Activity level: Returning to normal.    Pain:  He complains of pain that is mild.          Vital Signs  Temp:  [97.9 °F (36.6 °C)-98.9 °F (37.2 °C)] 98.5 °F (36.9 °C)  Heart Rate:  [70-77] 75  Resp:  [14-20] 16  BP: ()/(47-85) 120/66  Body mass index is 27.79 kg/(m^2).    Intake/Output Summary (Last 24 hours) at 04/19/17 0854  Last data filed at 04/19/17 0802   Gross per 24 hour   Intake             1460 ml   Output              300 ml   Net             1160 ml     I/O this shift:  In: 360 [P.O.:360]  Out: -         Last 3 weights    04/17/17  0500 04/18/17  0500 04/19/17  0500   Weight: 185 lb 9.6 oz (84.2 kg) 181 lb 12.8 oz (82.5 kg) 182 lb 12.8 oz (82.9 kg)         Objective:  General Appearance:  Comfortable and in no acute distress.    Vital signs: (most recent): Blood pressure 122/63, pulse 64, temperature 98 °F (36.7 °C), temperature source Oral, resp. rate 16, height 68\" (172.7 cm), weight 182 lb 12.8 oz (82.9 kg), SpO2 92 %.  No fever.    Output: Producing urine and producing stool.    Lungs:  Normal respiratory rate and normal effort.  Breath sounds clear to auscultation.  No rales or rhonchi.    Heart: Normal rate.  Regular rhythm.  S1 normal and S2 normal.  No murmur, gallop or friction rub.   Abdomen: Abdomen is soft and non-distended.    Extremities: There is dependent edema (trace pretibial edema).    Skin:  Warm and dry.              Results Review:        WBC WBC   Date Value Ref Range Status   04/18/2017 9.84 4.50 - 10.70 10*3/mm3 Final      HGB Hemoglobin   Date Value Ref Range Status   04/18/2017 13.5 (L) 13.7 - 17.6 g/dL Final      HCT " Hematocrit   Date Value Ref Range Status   04/18/2017 41.4 40.4 - 52.2 % Final      Platelets Platelets   Date Value Ref Range Status   04/18/2017 444 140 - 500 10*3/mm3 Final        PT/INR:    Protime   Date Value Ref Range Status   04/19/2017 69.2 (C) 11.7 - 14.2 Seconds Final   04/18/2017 18.9 (H) 11.7 - 14.2 Seconds Final   04/17/2017 14.9 (H) 11.7 - 14.2 Seconds Final   /  INR   Date Value Ref Range Status   04/19/2017 8.67 (C) 0.90 - 1.10 Final   04/18/2017 1.65 (H) 0.90 - 1.10 Final   04/17/2017 1.21 (H) 0.90 - 1.10 Final       Sodium Sodium   Date Value Ref Range Status   04/19/2017 136 136 - 145 mmol/L Final   04/18/2017 137 136 - 145 mmol/L Final   04/17/2017 137 136 - 145 mmol/L Final      Potassium Potassium   Date Value Ref Range Status   04/19/2017 4.1 3.5 - 5.2 mmol/L Final   04/18/2017 4.0 3.5 - 5.2 mmol/L Final   04/17/2017 3.4 (L) 3.5 - 5.2 mmol/L Final      Chloride Chloride   Date Value Ref Range Status   04/19/2017 99 98 - 107 mmol/L Final   04/18/2017 97 (L) 98 - 107 mmol/L Final   04/17/2017 98 98 - 107 mmol/L Final      Bicarbonate CO2   Date Value Ref Range Status   04/19/2017 24.4 22.0 - 29.0 mmol/L Final   04/18/2017 24.6 22.0 - 29.0 mmol/L Final   04/17/2017 24.7 22.0 - 29.0 mmol/L Final      BUN BUN   Date Value Ref Range Status   04/19/2017 11 8 - 23 mg/dL Final   04/18/2017 11 8 - 23 mg/dL Final   04/17/2017 11 8 - 23 mg/dL Final      Creatinine Creatinine   Date Value Ref Range Status   04/19/2017 0.86 0.76 - 1.27 mg/dL Final   04/18/2017 0.86 0.76 - 1.27 mg/dL Final   04/17/2017 0.78 0.76 - 1.27 mg/dL Final      Calcium Calcium   Date Value Ref Range Status   04/19/2017 8.6 8.6 - 10.5 mg/dL Final   04/18/2017 9.0 8.6 - 10.5 mg/dL Final   04/17/2017 8.9 8.6 - 10.5 mg/dL Final      Magnesium No results found for: MG         aspirin 81 mg Oral Daily   atorvastatin 40 mg Oral Nightly   bisacodyl 10 mg Rectal Once   furosemide 40 mg Oral Daily   metoprolol tartrate 25 mg Oral Q12H    pantoprazole 40 mg Oral Q AM   potassium chloride 20 mEq Oral Daily   rivaroxaban 20 mg Oral Daily With Dinner   sennosides-docusate sodium 2 tablet Oral Nightly   vitamin K1 5 mg Oral Once       lactated ringers 9 mL/hr    sodium chloride 75 mL/hr Last Rate: Stopped (04/18/17 1902)           Patient Active Problem List   Diagnosis Code   • Anxiety F41.9   • Arthritis M19.90   • CAD (coronary artery disease) I25.10   • HLD (hyperlipidemia) E78.5   • Benign essential hypertension I10   • DDD (degenerative disc disease), lumbosacral M51.37   • Stroke I63.9   • Screening for prostate cancer Z12.5   • Hypertension I10   • Gastroesophageal reflux disease K21.9   • Hyperlipidemia E78.5   • New onset atrial flutter I48.92       Assessment & Plan    CAD S/P CABG X3 WITH LIMA-----POD #8, on asa/bb/statin  PREOP NEW ONSET A.FLUTTER----- s/p ablation 4/18, on coumadin per cards, maintaining NSR  HTN  HL  QUESTIONABLE BRIAN----evaluate as outpatient  HX CVA S/P CEA  GERD  REMOTE KIDNEY STONES    Supratherapeutic INR, receiving vit K and FFP. Home later today.  Follow up in 4-6 weeks.    MY Sutton  04/19/17  8:54 AM

## 2017-04-19 NOTE — PLAN OF CARE
Problem: Patient Care Overview (Adult)  Goal: Plan of Care Review  Outcome: Ongoing (interventions implemented as appropriate)    04/19/17 0045   Coping/Psychosocial Response Interventions   Plan Of Care Reviewed With patient   Patient Care Overview   Progress improving   Outcome Evaluation   Outcome Summary/Follow up Plan PT had ablation, heart rhythm is NS in the 70's. BP stable. Some complaints of pain but has chronic back pain. Refused IV fluid therapy through night. Possible DC home tomorrow. Will continue to monitor closely.       Goal: Adult Individualization and Mutuality  Outcome: Ongoing (interventions implemented as appropriate)  Goal: Discharge Needs Assessment  Outcome: Ongoing (interventions implemented as appropriate)    Problem: Cardiac Surgery (Adult)  Goal: Signs and Symptoms of Listed Potential Problems Will be Absent or Manageable (Cardiac Surgery)  Outcome: Ongoing (interventions implemented as appropriate)    04/19/17 0045   Cardiac Surgery   Problems Assessed (Cardiac Surgery) all   Problems Present (Cardiac Surgery) none         Problem: Fall Risk (Adult)  Goal: Absence of Falls  Outcome: Ongoing (interventions implemented as appropriate)    04/19/17 0045   Fall Risk (Adult)   Absence of Falls making progress toward outcome         Problem: Skin Integrity Impairment, Risk/Actual (Adult)  Goal: Skin Integrity/Wound Healing  Outcome: Ongoing (interventions implemented as appropriate)    04/19/17 0045   Skin Integrity Impairment, Risk/Actual (Adult)   Skin Integrity/Wound Healing making progress toward outcome         Problem: Cardiac Catheterization with/without PCI (Adult)  Goal: Signs and Symptoms of Listed Potential Problems Will be Absent or Manageable (Cardiac Catheterization with/without PCI)  Outcome: Ongoing (interventions implemented as appropriate)    04/19/17 0045   Cardiac Catheterization with/without PCI   Problems Assessed (Cardiac Catheterization) all   Problems Present (Cardiac  Catheterization) none         Problem: Perioperative Period (Adult)  Goal: Signs and Symptoms of Listed Potential Problems Will be Absent or Manageable (Perioperative Period)  Outcome: Ongoing (interventions implemented as appropriate)    04/19/17 0045   Perioperative Period   Problems Assessed (Perioperative Period) all   Problems Present (Perioperative Period) none

## 2017-04-19 NOTE — DISCHARGE SUMMARY
Date of Admission: 4/7/2017    Date of Discharge:  4/19/2017    Discharge Diagnoses:   1. Paroxysmal typical atrial flutter (new) - Cavotricuspid isthmus ablation on 4/18/17 by Dr. Gustafson.  2. Unstable angina  3. Severe CAD (left main) - status post 3 vessel CABG on 4/11/17 by Dr. Cortez.  4. Hypertension  5. Hyperlipidemia  6. PVD, s/p CEA   7. EF 55-60%  8. Supratherapeutic INR just prior to discharge.      Procedures Performed:  1. Echocardiogram on 4/8/17  2. Left heart catheterization by Dr. Healy on 4/10/17  3. 3 vessel CABG by Dr. Cortez on 4/11/17 (LIMA-LAD, SVG-OM1, SVG-OM3).  4. AR on 4/17/17  5. Cavotricuspid isthmus ablation for atrial flutter on 4/18/17 by Dr. Gustafson.       Hospital Course:     This is a very pleasant 71-year-old white male who had formerly been followed by Dr. Roger with [facility].  He had a history of coronary artery disease, carotid endarterectomy, stroke for which he took Plavix, and hypertension.  He presented to the emergency department on 4/7/2017 after he presented to his primary care physician with a rapid heart rate of 150.  He was found to be in typical atrial flutter, which was a new diagnosis for him, although he was completely asymptomatic with regards to the atrial flutter.  He had experienced chest and throat pressure earlier in the week.  He was initially placed on diltiazem, and then an amiodarone drip which did convert him to sinus rhythm.  An echocardiogram performed on 4/8/2017 showed an ejection fraction of 55-60%, and no significant valvular disease.  He ultimately underwent a diagnostic left heart catheterization for presumed unstable angina and his history of coronary artery disease on 4/10/2017 by Dr. Healy.  This showed severe coronary artery disease, including a distal 80% severely calcified lesion in the left main which extended into the ostium of the left circumflex coronary artery.  The right coronary artery was small and nondominant  with no disease, and the LAD had only 20% disease proximally.  He subsequently was referred for a coronary artery bypass grafting operation, and underwent this by Dr. Cortez on 4/11/2017.  At that time, he had a LIMA to LAD, saphenous vein graft to first obtuse marginal branch, and saphenous vein graft to third abuse marginal branch.  He did very well postoperatively, but continued to have issues with rapid atrial flutter which was very difficult to rate control and did not convert on IV amiodarone.  He ultimately underwent a cavotricuspid isthmus atrial flutter ablation (for typical flutter) on 4/18/2017 by Dr. Gustafson.  A preoperative AR performed the day prior did not show evidence of thrombus formation.  He will need to be on anticoagulation for at least 1 month afterwards, at which time I will likely discontinue the Xarelto, and restart Plavix with his aspirin.  He was discharged on metoprolol which he was not on previously, and his Norvasc which she took at home was held given his blood pressure.  He did have a supratherapeutic INR of over 8 just prior to discharge, as he had been getting Coumadin for the last several days prior to his atrial flutter ablation.  At the time of this dictation, 2 units of FFP were ordered along with 5 mg of oral vitamin K.  His INR will be rechecked prior to discharge, and he will not take the Xarelto on the first day post discharge.  Home health will also check his INR tomorrow prior to administering any Xarelto.  He will follow-up with WINSTON Dickerson in 1 week at which time cardiac rehabilitation can be set up, and he will follow-up with me in approximately 4 weeks.      Discharge Medications:   Madhu Santoro   Home Medication Instructions OMAR:[id number]    Printed on:04/19/17 7781   Medication Information                      aspirin 81 MG tablet  Take 81 mg by mouth daily.             furosemide (LASIX) 40 MG tablet  Take 0.5 tablets by mouth Daily.              Hydrocodone-Acetaminophen (VICODIN) 5-300 MG per tablet  Take 1 tablet by mouth 2 (Two) Times a Day.             metoprolol tartrate (LOPRESSOR) 25 MG tablet  Take 1 tablet by mouth Every 12 (Twelve) Hours.             nitroglycerin (NITROSTAT) 0.4 MG SL tablet  Place 1 tablet under the tongue Every 5 (Five) Minutes As Needed for chest pain. Take no more than 3 doses in 15 minutes.             omeprazole (priLOSEC) 20 MG capsule  Take 1 capsule by mouth Daily.             potassium chloride (MICRO-K) 10 MEQ CR capsule  Take 1 capsule by mouth Daily.             ramipril (ALTACE) 5 MG capsule  Take 1 capsule by mouth Daily.             rivaroxaban (XARELTO) 20 MG tablet  Take 1 tablet by mouth Daily With Dinner.             rosuvastatin (CRESTOR) 10 MG tablet  Take 1 tablet by mouth Daily.               Physical Exam:   General Appearance:    No acute distress, alert and oriented x4   Lungs:     Clear to auscultation bilaterally     Heart:    Regular rhythm and normal rate.  No murmurs, gallops, or       rubs.   Abdomen:     Soft, non-tender, non-distended.    Extremities:   Moves all extremities well.  No clubbing, cyanosis, or edema.     Follow-up:  1. Follow-up with WINSTON Dickerson, in one week  2.  Follow-up with me in 4 weeks  3.  Follow-up with Dr. Cortez in 6 weeks      Time Spent on Discharge: Greater than 30 minutes        Eric Wright MD  04/19/17  8:28 AM

## 2017-04-20 LAB
ABO + RH BLD: NORMAL
ABO + RH BLD: NORMAL
BH BB BLOOD EXPIRATION DATE: NORMAL
BH BB BLOOD EXPIRATION DATE: NORMAL
BH BB BLOOD TYPE BARCODE: 5100
BH BB BLOOD TYPE BARCODE: 9500
BH BB DISPENSE STATUS: NORMAL
BH BB DISPENSE STATUS: NORMAL
BH BB PRODUCT CODE: NORMAL
BH BB PRODUCT CODE: NORMAL
BH BB UNIT NUMBER: NORMAL
BH BB UNIT NUMBER: NORMAL
UNIT  ABO: NORMAL
UNIT  ABO: NORMAL
UNIT  RH: NORMAL
UNIT  RH: NORMAL

## 2017-04-24 ENCOUNTER — TELEPHONE (OUTPATIENT)
Dept: CARDIOLOGY | Facility: CLINIC | Age: 72
End: 2017-04-24

## 2017-04-24 NOTE — TELEPHONE ENCOUNTER
Malou [facility] called with update on pt. She states pt's weight is down. Last Thursday he weighted 185 now 176 lost 9 lbs.   She states family too b/p and Hr earlier. B/P 140/60 HR 41. When she took b/p and hr it was 118/64 hr 46. She states hr over weekend was in 50's.   He is taking Ramipril 5 mg daily, Metoprolol Tart 25 mg bid and Lasix 20 mg daily   Pt does have appt with Jennifer on Wednesday.   Please advise   Malou call back # [phone]  Thanks Jaime JOLLY

## 2017-04-24 NOTE — TELEPHONE ENCOUNTER
I left a detailed voicemail  message on Malou Gonzalez RN with [facility] with medication instructions on it.   I asked her to call with any questions or concerns.   Jaime JOLLY

## 2017-04-24 NOTE — TELEPHONE ENCOUNTER
Jaime,    Can you please have him hold his metoprolol and double his ramipril to 10 mg per day until he sees Jennifer on Wednesday?  Thanks     MARYBEL

## 2017-04-26 ENCOUNTER — OFFICE VISIT (OUTPATIENT)
Dept: CARDIOLOGY | Facility: CLINIC | Age: 72
End: 2017-04-26

## 2017-04-26 VITALS
OXYGEN SATURATION: 100 % | WEIGHT: 176 LBS | BODY MASS INDEX: 26.67 KG/M2 | DIASTOLIC BLOOD PRESSURE: 72 MMHG | HEIGHT: 68 IN | HEART RATE: 69 BPM | SYSTOLIC BLOOD PRESSURE: 124 MMHG

## 2017-04-26 DIAGNOSIS — I25.10 CHRONIC CORONARY ARTERY DISEASE: ICD-10-CM

## 2017-04-26 DIAGNOSIS — I25.10 CORONARY ARTERY DISEASE INVOLVING NATIVE CORONARY ARTERY OF NATIVE HEART WITHOUT ANGINA PECTORIS: ICD-10-CM

## 2017-04-26 DIAGNOSIS — Z98.890 S/P ABLATION OF ATRIAL FLUTTER: ICD-10-CM

## 2017-04-26 DIAGNOSIS — Z95.1 S/P CABG (CORONARY ARTERY BYPASS GRAFT): Primary | ICD-10-CM

## 2017-04-26 DIAGNOSIS — Z86.79 S/P ABLATION OF ATRIAL FLUTTER: ICD-10-CM

## 2017-04-26 DIAGNOSIS — I48.3 TYPICAL ATRIAL FLUTTER (HCC): ICD-10-CM

## 2017-04-26 DIAGNOSIS — I25.83 CORONARY ARTERY DISEASE DUE TO LIPID RICH PLAQUE: ICD-10-CM

## 2017-04-26 DIAGNOSIS — I25.10 CORONARY ARTERY DISEASE DUE TO LIPID RICH PLAQUE: ICD-10-CM

## 2017-04-26 PROBLEM — I65.29 STENOSIS OF CAROTID ARTERY: Status: ACTIVE | Noted: 2017-04-26

## 2017-04-26 PROBLEM — Z87.891 FORMER SMOKER: Status: ACTIVE | Noted: 2017-04-26

## 2017-04-26 PROCEDURE — 99214 OFFICE O/P EST MOD 30 MIN: CPT | Performed by: PHYSICIAN ASSISTANT

## 2017-04-26 PROCEDURE — 93000 ELECTROCARDIOGRAM COMPLETE: CPT | Performed by: PHYSICIAN ASSISTANT

## 2017-04-26 RX ORDER — RAMIPRIL 5 MG/1
5 CAPSULE ORAL DAILY
Qty: 90 CAPSULE | Refills: 1
Start: 2017-04-26 | End: 2017-06-12 | Stop reason: SDUPTHER

## 2017-04-26 RX ORDER — HYDROCODONE BITARTRATE AND ACETAMINOPHEN 7.5; 325 MG/1; MG/1
1 TABLET ORAL EVERY 6 HOURS PRN
COMMUNITY
End: 2017-05-08

## 2017-04-26 NOTE — PROGRESS NOTES
Date of Office Visit: 2017  Encounter Provider: WINSTON Gudino  Place of Service: [facility] CARDIOLOGY  Patient Name: Madhu Santoro  :1945    Chief Complaint   Patient presents with   • Chest Pain     1 week hospital follow up   :     HPI: Madhu Santoro is a 71 y.o. male, new to me, who presents today for follow-up.  Old records have been obtained and reviewed by me.  He is a patient who had formerly been followed by Dr. Roger with [place] [facility].  He has a past medical history significant for coronary artery disease, carotid disease status post endarterectomy, stroke (for which he took Plavix), and hypertension.  He presented to the emergency room at [facility] on 2017 after being sent by his PCP because he had a rapid heart rate at 150 BPM.  He was found to be in typical atrial flutter which was a new diagnosis for him.  He was evidently asymptomatic.  Earlier that week he had experienced chest and throat pressure.  He was placed on diltiazem and then an amiodarone drip which converted him to sinus rhythm.  An echocardiogram on 2017 showed a normal EF at 55-60% and no significant valvular disease.  As of his unstable angina, he underwent cardiac catheterization on 4/10/2017.  This showed severe coronary artery disease including a severely calcified distal left main at 80%.  Because of this, he underwent a CABG by Dr. Cortez on 2017 with a LIMA to the LAD, SVG to the OM1, and SVG to the OM 3.  Postoperatively he continued to have rapid atrial flutter.  This time he did not convert on amiodarone, and on  he underwent a complicated tricuspid isthmus atrial flutter ablation by Dr. Gustafson.  Limitations were for anticoagulation for 1 month on Xarelto.  After 1 month, we would most likely restart the Plavix and aspirin and discontinue the Xarelto.  Started on a beta blocker, and we discontinued his Norvasc secondary to low  blood pressure.  He has been started on Coumadin for several days prior to his ablation, and before he was discharged home he received 2 units of FFP and 5 mg of vitamin K secondary to a supratherapeutic INR was over 8.  This was rechecked, and had come down to 4.2.  He was discharged home on 4/19/2017 in stable condition.   Since he's been home, he's been doing well.  He feels that he is getting better and better every day.  He does complain of some incisional pain and some fatigue.  Evidently one day at home his heart rate was 46 and he felt terrible.  Dr. Wright was called, and it was recommended that he stop the metoprolol and increase the ramipril.  Since doing this, his heart rate has been normal and his blood pressure has been good as well.  He has a good appetite, and he and his wife are eating a heart healthy diet.      Past Medical History:   Diagnosis Date   • Anxiety    • Arthritis    • Benign essential hypertension    • CAD (coronary artery disease)    • Chest pain    • Colonic polyp    • DDD (degenerative disc disease), lumbosacral    • H/O bone density study 2013   • H/O complete eye exam 2014   • HLD (hyperlipidemia)    • Hypertension    • Lipid screening 05/31/2013   • Low back pain     physical therapy [facility] 5-12-10   • Screening for prostate cancer 07/07/2015   • Stroke        Past Surgical History:   Procedure Laterality Date   • CARDIAC CATHETERIZATION N/A 4/10/2017    Procedure: Left Heart Cath;  Surgeon: Marjorie Healy MD;  Location: [facility] CATH INVASIVE LOCATION;  Service:    • CARDIAC CATHETERIZATION N/A 4/10/2017    Procedure: Coronary angiography;  Surgeon: Marjorie Healy MD;  Location: [facility] CATH INVASIVE LOCATION;  Service:    • CARDIAC CATHETERIZATION N/A 4/10/2017    Procedure: Left ventriculography;  Surgeon: Marjorie Healy MD;  Location: [facility] CATH INVASIVE LOCATION;  Service:    • CARDIAC ELECTROPHYSIOLOGY PROCEDURE N/A 4/18/2017    Procedure: Ablation atrial  estella;  Surgeon: Jose Antonio Gustafson MD;  Location: [facility] INVASIVE LOCATION;  Service:    • COLONOSCOPY  2010   • CORONARY ARTERY BYPASS GRAFT N/A 4/11/2017    Procedure: AR STERNOTOMY CORONARY ARTERY BYPASS GRAFT TIMES 3 USING LEFT INTERNAL MAMMARY ARTERY AND LEFT GREATER SAPHENOUS VEIN GRAFT PER ENDOSCOPIC VEIN HARVESTING AND PRP ;  Surgeon: Temo Cortez MD;  Location: [facility] OR;  Service:        Social History     Social History   • Marital status:      Spouse name: N/A   • Number of children: N/A   • Years of education: N/A     Occupational History   • Not on file.     Social History Main Topics   • Smoking status: Former Smoker   • Smokeless tobacco: Never Used   • Alcohol use No   • Drug use: No   • Sexual activity: Yes     Partners: Female     Other Topics Concern   • Not on file     Social History Narrative       Family History   Problem Relation Age of Onset   • Heart disease Mother    • Heart attack Mother    • Stroke Mother    • Heart disease Father    • Heart attack Father    • Stroke Father    • Arthritis Other    • Diabetes Other    • Hypertension Other    • Kidney disease Other      stones   • Hypertension Sister    • Heart attack Brother    • Heart disease Brother    • No Known Problems Maternal Grandmother    • No Known Problems Maternal Grandfather    • No Known Problems Paternal Grandmother    • No Known Problems Paternal Grandfather    • No Known Problems Brother    • Heart disease Brother    • Heart attack Brother    • Diabetes Brother        Review of Systems   Constitution: Positive for malaise/fatigue. Negative for chills, fever, weight gain and weight loss.   HENT: Negative for ear pain, headaches, hearing loss, nosebleeds and sore throat.    Eyes: Negative for double vision, pain and visual disturbance.   Cardiovascular: Negative for chest pain, dyspnea on exertion, irregular heartbeat, leg swelling, near-syncope, orthopnea, palpitations, paroxysmal nocturnal dyspnea  and syncope.   Respiratory: Negative for cough, shortness of breath, sleep disturbances due to breathing, snoring and wheezing.    Endocrine: Negative for cold intolerance, heat intolerance and polyuria.   Skin: Negative for itching and rash.   Musculoskeletal: Negative for joint pain, joint swelling and myalgias.   Gastrointestinal: Negative for abdominal pain, diarrhea, melena, nausea and vomiting.   Genitourinary: Negative for frequency, hematuria and hesitancy.   Neurological: Negative for excessive daytime sleepiness, light-headedness, numbness, paresthesias and seizures.   Psychiatric/Behavioral: Negative for altered mental status and depression.   Allergic/Immunologic: Negative.    All other systems reviewed and are negative.      Allergies   Allergen Reactions   • Penicillins          Current Outpatient Prescriptions:   •  aspirin 81 MG tablet, Take 81 mg by mouth daily., Disp: , Rfl:   •  furosemide (LASIX) 40 MG tablet, Take 0.5 tablets by mouth Daily., Disp: 30 tablet, Rfl: 11  •  HYDROcodone-acetaminophen (NORCO) 7.5-325 MG per tablet, Take 1 tablet by mouth Every 6 (Six) Hours As Needed for Moderate Pain (4-6)., Disp: , Rfl:   •  HYDROcodone-acetaminophen (VICODIN) 5-500 MG per tablet, Take 1 tablet by mouth Every 6 (Six) Hours As Needed for Moderate Pain (4-6)., Disp: , Rfl:   •  nitroglycerin (NITROSTAT) 0.4 MG SL tablet, Place 1 tablet under the tongue Every 5 (Five) Minutes As Needed for chest pain. Take no more than 3 doses in 15 minutes., Disp: 20 tablet, Rfl: 1  •  omeprazole (priLOSEC) 20 MG capsule, Take 1 capsule by mouth Daily., Disp: 90 capsule, Rfl: 1  •  potassium chloride (MICRO-K) 10 MEQ CR capsule, Take 1 capsule by mouth Daily., Disp: 30 capsule, Rfl: 11  •  ramipril (ALTACE) 5 MG capsule, Take 1 capsule by mouth Daily. (Patient taking differently: Take 5 mg by mouth 2 (Two) Times a Day.), Disp: 90 capsule, Rfl: 1  •  rivaroxaban (XARELTO) 20 MG tablet, Take 1 tablet by mouth Daily  "With Dinner., Disp: 30 tablet, Rfl: 1  •  rosuvastatin (CRESTOR) 10 MG tablet, Take 1 tablet by mouth Daily., Disp: 90 tablet, Rfl: 1     Objective:     Vitals:    04/26/17 0859 04/26/17 0900   BP: 120/70 124/72   BP Location: Right arm Left arm   Pulse: 69    SpO2: 100%    Weight: 176 lb (79.8 kg)    Height: 68\" (172.7 cm)      Body mass index is 26.76 kg/(m^2).    PHYSICAL EXAM:    Physical Exam   Constitutional: He is oriented to person, place, and time. He appears well-developed and well-nourished. No distress.   HENT:   Head: Normocephalic and atraumatic.   Eyes: Pupils are equal, round, and reactive to light.   Neck: No JVD present. No thyromegaly present.   Cardiovascular: Normal rate, regular rhythm, normal heart sounds and intact distal pulses.    No murmur heard.  Right femoral access site well healed without erythema or edema.   Pulmonary/Chest: Effort normal and breath sounds normal. No respiratory distress.   Abdominal: Soft. Bowel sounds are normal. He exhibits no distension. There is no splenomegaly or hepatomegaly. There is no tenderness.   Musculoskeletal: Normal range of motion. He exhibits no edema.   Neurological: He is alert and oriented to person, place, and time.   Skin: Skin is warm and dry. He is not diaphoretic. No erythema.   Sternal incision and left lower extremity incision well healed without erythema or edema.   Psychiatric: He has a normal mood and affect. His behavior is normal. Judgment normal.         ECG 12 Lead  Date/Time: 4/26/2017 9:21 AM  Performed by: TORREY ALEGRIA.  Authorized by: TORREY ALEGRIA.   Comparison: compared with previous ECG from 4/19/2017  Similar to previous ECG  Rhythm: sinus rhythm  BPM: 69  Clinical impression: normal ECG  Comments: Indication: Coronary artery disease, atrial flutter.              Assessment:       Diagnosis Plan   1. S/P CABG (coronary artery bypass graft)     2. Typical atrial flutter  ECG 12 Lead   3. S/P ablation of atrial flutter   "   4. Chronic coronary artery disease  ECG 12 Lead     Orders Placed This Encounter   Procedures   • ECG 12 Lead     This order was created via procedure documentation          Plan:       1.  Atrial Fibrillation and Atrial Flutter  Assessment  • The patient has paroxysmal atrial flutter  • This is non-valvular in etiology  • The patient's CHADS2-VASc score is 5  • A [id number] score of 2 or more is considered a high risk for a thromboembolic event  • Rivaroxaban prescribed    Plan  • Attempt to maintain sinus rhythm  • Continue rivaroxaban for antithrombotic therapy, bleeding issues discussed  • Continue beta blocker for rhythm control  • Continue beta blocker for rate control    Subjective - Objective  • The patient had atrial fibrillation ablation   • He is in sinus rhythm today.  He held his beta blocker secondary to bradycardia with a heart rate of 46.  He was taking Lopressor 25 mg twice a day.  I've asked him to take Lopressor 12.5 mg twice a day and to go back down on the ramipril from 10 mg a day to 5 mg a day.  If his heart rate drops below 50, he will hold the beta blocker.  He is anticoagulated with Xarelto and not having any bleeding issues.  At one month we will transition him off of Xarelto and add Plavix back to his medical regimen.    2.  Coronary Artery Disease  Assessment  • The patient has no angina  • There is a new diagnosis of stable angina in the past 12 months  • The patient is having symptoms consistent with unstable angina     Plan  • Lifestyle modifications discussed include adhering to a heart healthy diet, medication compliance, regular exercise and regular monitoring of cholesterol and blood pressure    Subjective - Objective  • There is a history of previous coronary artery bypass graft  • Current antiplatelet therapy includes aspirin 81 mg  • The patient qualifies for cardiac rehabilitation, and has been referred to cardiac rehab  • Overall he's doing well.  Every day he is feeling  better.  He is currently on aspirin and Xarelto.  We will transition him to aspirin and Plavix after 1 month.  He is eating a heart healthy diet, and he will start cardiac rehabilitation in the next 1-2 weeks.    He will follow-up with Dr. Wright on 5/17/2017 and Dr. Cortez on 5/26/2017.  As always, it has been a pleasure to participate in your patient's care.      Sincerely,         Jennifer Elise PA-C

## 2017-04-27 ENCOUNTER — TELEPHONE (OUTPATIENT)
Dept: CARDIOLOGY | Facility: CLINIC | Age: 72
End: 2017-04-27

## 2017-04-27 NOTE — TELEPHONE ENCOUNTER
The parameters I gave him were to call  If his HR went below 50.  Please let her know.    Thanks,  Jennifer

## 2017-04-27 NOTE — TELEPHONE ENCOUNTER
I talked to Mar with [facility] care about the patient HR. She states that on arrival to the patients house his heart rate was 58. After about 10 minutes she rechecked it and it was 51-53. His BP is 120/62. He is taking 1 Ramipril 5mg daily and Metoprolol 25 mg 0.5 every 12 hrs daily. She was told that he needed to call if his HR dropped below 54. She can be reached at [phone]      Thanks  Vinod

## 2017-04-28 NOTE — TELEPHONE ENCOUNTER
04/28/17  12:58 PM  Madhu Santoro  1945    Home Phone [phone]   Mobile [phone]     Maricarmen Garcia , [phone]    Radha calls. Today, patient's HR 48 with /68. She reports that he feels weak and fatigued. He is taking 12.5mg metoprolol BID and 5mg ramipril.    Do his medications need to be adjusted, or is he supposed to hold dose if HR <50?    Jany JAIN RN

## 2017-04-28 NOTE — TELEPHONE ENCOUNTER
04/28/17  1:22 PM  Per Dr. Wright: patient should hold metoprolol until his appt on 5/17/17. I called Radah/ MERRITT zamora.

## 2017-05-01 ENCOUNTER — TELEPHONE (OUTPATIENT)
Dept: CARDIOLOGY | Facility: CLINIC | Age: 72
End: 2017-05-01

## 2017-05-03 ENCOUNTER — TRANSCRIBE ORDERS (OUTPATIENT)
Dept: CARDIAC REHAB | Facility: HOSPITAL | Age: 72
End: 2017-05-03

## 2017-05-03 DIAGNOSIS — Z95.1 S/P CABG X 3: Primary | ICD-10-CM

## 2017-05-04 ENCOUNTER — TELEPHONE (OUTPATIENT)
Dept: CARDIOLOGY | Facility: CLINIC | Age: 72
End: 2017-05-04

## 2017-05-08 ENCOUNTER — OFFICE VISIT (OUTPATIENT)
Dept: FAMILY MEDICINE CLINIC | Facility: CLINIC | Age: 72
End: 2017-05-08

## 2017-05-08 VITALS
DIASTOLIC BLOOD PRESSURE: 66 MMHG | HEART RATE: 80 BPM | BODY MASS INDEX: 26.83 KG/M2 | HEIGHT: 68 IN | OXYGEN SATURATION: 98 % | TEMPERATURE: 97.8 F | RESPIRATION RATE: 16 BRPM | SYSTOLIC BLOOD PRESSURE: 110 MMHG | WEIGHT: 177 LBS

## 2017-05-08 DIAGNOSIS — Z98.890 S/P ABLATION OF ATRIAL FLUTTER: ICD-10-CM

## 2017-05-08 DIAGNOSIS — Z86.79 S/P ABLATION OF ATRIAL FLUTTER: ICD-10-CM

## 2017-05-08 DIAGNOSIS — I10 BENIGN ESSENTIAL HYPERTENSION: ICD-10-CM

## 2017-05-08 DIAGNOSIS — Z95.1 S/P CABG (CORONARY ARTERY BYPASS GRAFT): Primary | ICD-10-CM

## 2017-05-08 DIAGNOSIS — M51.37 DDD (DEGENERATIVE DISC DISEASE), LUMBOSACRAL: ICD-10-CM

## 2017-05-08 DIAGNOSIS — I48.3 TYPICAL ATRIAL FLUTTER (HCC): ICD-10-CM

## 2017-05-08 PROCEDURE — 99214 OFFICE O/P EST MOD 30 MIN: CPT | Performed by: FAMILY MEDICINE

## 2017-05-10 ENCOUNTER — OFFICE VISIT (OUTPATIENT)
Dept: CARDIAC REHAB | Facility: HOSPITAL | Age: 72
End: 2017-05-10

## 2017-05-10 VITALS — HEIGHT: 68 IN | WEIGHT: 177 LBS | BODY MASS INDEX: 26.83 KG/M2

## 2017-05-10 DIAGNOSIS — Z95.1 S/P CABG (CORONARY ARTERY BYPASS GRAFT): Primary | ICD-10-CM

## 2017-05-10 PROCEDURE — 93797 PHYS/QHP OP CAR RHAB WO ECG: CPT

## 2017-05-15 ENCOUNTER — TREATMENT (OUTPATIENT)
Dept: CARDIAC REHAB | Facility: HOSPITAL | Age: 72
End: 2017-05-15

## 2017-05-15 DIAGNOSIS — Z95.1 S/P CABG (CORONARY ARTERY BYPASS GRAFT): Primary | ICD-10-CM

## 2017-05-15 PROCEDURE — 93798 PHYS/QHP OP CAR RHAB W/ECG: CPT

## 2017-05-17 ENCOUNTER — OFFICE VISIT (OUTPATIENT)
Dept: CARDIOLOGY | Facility: CLINIC | Age: 72
End: 2017-05-17

## 2017-05-17 VITALS
SYSTOLIC BLOOD PRESSURE: 108 MMHG | HEIGHT: 68 IN | WEIGHT: 177 LBS | HEART RATE: 73 BPM | DIASTOLIC BLOOD PRESSURE: 68 MMHG | OXYGEN SATURATION: 98 % | BODY MASS INDEX: 26.83 KG/M2

## 2017-05-17 DIAGNOSIS — I48.3 TYPICAL ATRIAL FLUTTER (HCC): ICD-10-CM

## 2017-05-17 DIAGNOSIS — I10 ESSENTIAL HYPERTENSION: ICD-10-CM

## 2017-05-17 DIAGNOSIS — I77.9 RIGHT-SIDED CAROTID ARTERY DISEASE (HCC): ICD-10-CM

## 2017-05-17 DIAGNOSIS — I25.810 CORONARY ARTERY DISEASE INVOLVING CORONARY BYPASS GRAFT OF NATIVE HEART WITHOUT ANGINA PECTORIS: Primary | ICD-10-CM

## 2017-05-17 PROCEDURE — 99214 OFFICE O/P EST MOD 30 MIN: CPT | Performed by: INTERNAL MEDICINE

## 2017-05-19 ENCOUNTER — TREATMENT (OUTPATIENT)
Dept: CARDIAC REHAB | Facility: HOSPITAL | Age: 72
End: 2017-05-19

## 2017-05-19 DIAGNOSIS — Z95.1 S/P CABG (CORONARY ARTERY BYPASS GRAFT): Primary | ICD-10-CM

## 2017-05-19 PROCEDURE — 93798 PHYS/QHP OP CAR RHAB W/ECG: CPT

## 2017-05-22 ENCOUNTER — TREATMENT (OUTPATIENT)
Dept: CARDIAC REHAB | Facility: HOSPITAL | Age: 72
End: 2017-05-22

## 2017-05-22 DIAGNOSIS — Z95.1 S/P CABG (CORONARY ARTERY BYPASS GRAFT): Primary | ICD-10-CM

## 2017-05-22 PROCEDURE — 93798 PHYS/QHP OP CAR RHAB W/ECG: CPT

## 2017-05-24 ENCOUNTER — TREATMENT (OUTPATIENT)
Dept: CARDIAC REHAB | Facility: HOSPITAL | Age: 72
End: 2017-05-24

## 2017-05-24 DIAGNOSIS — Z95.1 S/P CABG (CORONARY ARTERY BYPASS GRAFT): Primary | ICD-10-CM

## 2017-05-24 PROCEDURE — 93798 PHYS/QHP OP CAR RHAB W/ECG: CPT

## 2017-05-26 ENCOUNTER — OFFICE VISIT (OUTPATIENT)
Dept: CARDIAC SURGERY | Facility: CLINIC | Age: 72
End: 2017-05-26

## 2017-05-26 VITALS
SYSTOLIC BLOOD PRESSURE: 142 MMHG | DIASTOLIC BLOOD PRESSURE: 79 MMHG | BODY MASS INDEX: 26.83 KG/M2 | TEMPERATURE: 97.3 F | HEART RATE: 64 BPM | WEIGHT: 177 LBS | RESPIRATION RATE: 20 BRPM | OXYGEN SATURATION: 100 % | HEIGHT: 68 IN

## 2017-05-26 DIAGNOSIS — Z98.890 S/P ABLATION OF ATRIAL FLUTTER: ICD-10-CM

## 2017-05-26 DIAGNOSIS — Z86.79 S/P ABLATION OF ATRIAL FLUTTER: ICD-10-CM

## 2017-05-26 DIAGNOSIS — Z95.1 S/P CABG (CORONARY ARTERY BYPASS GRAFT): Primary | ICD-10-CM

## 2017-05-26 PROCEDURE — 99024 POSTOP FOLLOW-UP VISIT: CPT | Performed by: THORACIC SURGERY (CARDIOTHORACIC VASCULAR SURGERY)

## 2017-05-26 RX ORDER — CLOPIDOGREL BISULFATE 75 MG/1
75 TABLET ORAL DAILY
COMMUNITY
End: 2017-08-10 | Stop reason: SDUPTHER

## 2017-06-02 ENCOUNTER — TREATMENT (OUTPATIENT)
Dept: CARDIAC REHAB | Facility: HOSPITAL | Age: 72
End: 2017-06-02

## 2017-06-02 DIAGNOSIS — Z95.1 S/P CABG (CORONARY ARTERY BYPASS GRAFT): Primary | ICD-10-CM

## 2017-06-02 PROCEDURE — 93798 PHYS/QHP OP CAR RHAB W/ECG: CPT

## 2017-06-05 ENCOUNTER — TREATMENT (OUTPATIENT)
Dept: CARDIAC REHAB | Facility: HOSPITAL | Age: 72
End: 2017-06-05

## 2017-06-05 ENCOUNTER — APPOINTMENT (OUTPATIENT)
Dept: CARDIAC REHAB | Facility: HOSPITAL | Age: 72
End: 2017-06-05

## 2017-06-05 DIAGNOSIS — Z95.1 S/P CABG (CORONARY ARTERY BYPASS GRAFT): Primary | ICD-10-CM

## 2017-06-05 PROCEDURE — 93798 PHYS/QHP OP CAR RHAB W/ECG: CPT

## 2017-06-07 ENCOUNTER — APPOINTMENT (OUTPATIENT)
Dept: CARDIAC REHAB | Facility: HOSPITAL | Age: 72
End: 2017-06-07

## 2017-06-08 DIAGNOSIS — I25.10 CORONARY ARTERY DISEASE DUE TO LIPID RICH PLAQUE: ICD-10-CM

## 2017-06-08 DIAGNOSIS — I25.83 CORONARY ARTERY DISEASE DUE TO LIPID RICH PLAQUE: ICD-10-CM

## 2017-06-08 DIAGNOSIS — M51.37 DDD (DEGENERATIVE DISC DISEASE), LUMBOSACRAL: ICD-10-CM

## 2017-06-09 ENCOUNTER — TREATMENT (OUTPATIENT)
Dept: CARDIAC REHAB | Facility: HOSPITAL | Age: 72
End: 2017-06-09

## 2017-06-09 DIAGNOSIS — Z95.1 S/P CABG (CORONARY ARTERY BYPASS GRAFT): Primary | ICD-10-CM

## 2017-06-09 PROCEDURE — 93798 PHYS/QHP OP CAR RHAB W/ECG: CPT

## 2017-06-12 ENCOUNTER — APPOINTMENT (OUTPATIENT)
Dept: CARDIAC REHAB | Facility: HOSPITAL | Age: 72
End: 2017-06-12

## 2017-06-12 RX ORDER — HYDROCODONE BITARTRATE AND ACETAMINOPHEN 5; 300 MG/1; MG/1
1 TABLET ORAL 2 TIMES DAILY
Qty: 60 TABLET | Refills: 0 | Status: SHIPPED | OUTPATIENT
Start: 2017-06-12 | End: 2017-07-12 | Stop reason: SDUPTHER

## 2017-06-12 RX ORDER — RAMIPRIL 5 MG/1
10 CAPSULE ORAL DAILY
Qty: 60 CAPSULE | Refills: 0 | Status: SHIPPED | OUTPATIENT
Start: 2017-06-12 | End: 2017-08-10 | Stop reason: SDUPTHER

## 2017-06-14 ENCOUNTER — APPOINTMENT (OUTPATIENT)
Dept: CARDIAC REHAB | Facility: HOSPITAL | Age: 72
End: 2017-06-14

## 2017-06-19 ENCOUNTER — APPOINTMENT (OUTPATIENT)
Dept: CARDIAC REHAB | Facility: HOSPITAL | Age: 72
End: 2017-06-19

## 2017-06-21 ENCOUNTER — APPOINTMENT (OUTPATIENT)
Dept: CARDIAC REHAB | Facility: HOSPITAL | Age: 72
End: 2017-06-21

## 2017-06-26 ENCOUNTER — APPOINTMENT (OUTPATIENT)
Dept: CARDIAC REHAB | Facility: HOSPITAL | Age: 72
End: 2017-06-26

## 2017-06-26 ENCOUNTER — TREATMENT (OUTPATIENT)
Dept: CARDIAC REHAB | Facility: HOSPITAL | Age: 72
End: 2017-06-26

## 2017-06-26 DIAGNOSIS — Z95.1 S/P CABG (CORONARY ARTERY BYPASS GRAFT): Primary | ICD-10-CM

## 2017-06-26 PROCEDURE — 93798 PHYS/QHP OP CAR RHAB W/ECG: CPT

## 2017-06-28 ENCOUNTER — APPOINTMENT (OUTPATIENT)
Dept: CARDIAC REHAB | Facility: HOSPITAL | Age: 72
End: 2017-06-28

## 2017-07-03 ENCOUNTER — APPOINTMENT (OUTPATIENT)
Dept: CARDIAC REHAB | Facility: HOSPITAL | Age: 72
End: 2017-07-03

## 2017-07-05 ENCOUNTER — APPOINTMENT (OUTPATIENT)
Dept: CARDIAC REHAB | Facility: HOSPITAL | Age: 72
End: 2017-07-05

## 2017-07-10 ENCOUNTER — APPOINTMENT (OUTPATIENT)
Dept: CARDIAC REHAB | Facility: HOSPITAL | Age: 72
End: 2017-07-10

## 2017-07-10 ENCOUNTER — TELEPHONE (OUTPATIENT)
Dept: CARDIOLOGY | Facility: CLINIC | Age: 72
End: 2017-07-10

## 2017-07-10 NOTE — TELEPHONE ENCOUNTER
Jany,    I spoke with his wife on the phone.  I told him to change the Ramipril to 5 mg from 10 mg, and to take it at night.  The CP is very atypical and is non-exertional.  He hhas a follow-up with me next week.  Thanks.    MARYBEL

## 2017-07-10 NOTE — TELEPHONE ENCOUNTER
07/10/17  1:25 PM  Madhu Santoro  1945    Home Phone [phone]   Mobile [phone]     Mr. Santoro calls to report that today his BP has been 100/57 to 109/59 with HR 78. He states his baseline BP is 110s/70s. He reports no lightheadedness, but reports feeling weak and having no energy for 2-3 days. He is able to walk 12 blocks, mow the grass, and run the weedeater over the past week. He takes 10mg ramipril daily.    He also reports a pain in his left side that has been constant for several days. He denies recent injury. He denies have pain when deep breaths. He denies chest pain. I suggested that he may need to see PCP for the pain.    Jany JAIN RN

## 2017-07-12 ENCOUNTER — APPOINTMENT (OUTPATIENT)
Dept: CARDIAC REHAB | Facility: HOSPITAL | Age: 72
End: 2017-07-12

## 2017-07-12 DIAGNOSIS — M51.37 DDD (DEGENERATIVE DISC DISEASE), LUMBOSACRAL: ICD-10-CM

## 2017-07-12 RX ORDER — HYDROCODONE BITARTRATE AND ACETAMINOPHEN 5; 300 MG/1; MG/1
1 TABLET ORAL 2 TIMES DAILY
Qty: 60 TABLET | Refills: 0 | Status: SHIPPED | OUTPATIENT
Start: 2017-07-12 | End: 2017-08-10 | Stop reason: SDUPTHER

## 2017-07-17 ENCOUNTER — APPOINTMENT (OUTPATIENT)
Dept: CARDIAC REHAB | Facility: HOSPITAL | Age: 72
End: 2017-07-17

## 2017-07-17 ENCOUNTER — TELEPHONE (OUTPATIENT)
Dept: CARDIAC REHAB | Facility: HOSPITAL | Age: 72
End: 2017-07-17

## 2017-07-18 ENCOUNTER — OFFICE VISIT (OUTPATIENT)
Dept: CARDIOLOGY | Facility: CLINIC | Age: 72
End: 2017-07-18

## 2017-07-18 VITALS
DIASTOLIC BLOOD PRESSURE: 74 MMHG | BODY MASS INDEX: 27.58 KG/M2 | OXYGEN SATURATION: 98 % | WEIGHT: 182 LBS | HEART RATE: 60 BPM | SYSTOLIC BLOOD PRESSURE: 110 MMHG | HEIGHT: 68 IN

## 2017-07-18 DIAGNOSIS — Z98.890 STATUS POST CATHETER ABLATION OF ATRIAL FLUTTER: ICD-10-CM

## 2017-07-18 DIAGNOSIS — I25.810 CORONARY ARTERY DISEASE INVOLVING CORONARY BYPASS GRAFT OF NATIVE HEART WITHOUT ANGINA PECTORIS: Primary | ICD-10-CM

## 2017-07-18 DIAGNOSIS — I77.9 RIGHT-SIDED CAROTID ARTERY DISEASE (HCC): ICD-10-CM

## 2017-07-18 PROCEDURE — 99213 OFFICE O/P EST LOW 20 MIN: CPT | Performed by: INTERNAL MEDICINE

## 2017-07-19 ENCOUNTER — OFFICE VISIT (OUTPATIENT)
Dept: FAMILY MEDICINE CLINIC | Facility: CLINIC | Age: 72
End: 2017-07-19

## 2017-07-19 ENCOUNTER — APPOINTMENT (OUTPATIENT)
Dept: CARDIAC REHAB | Facility: HOSPITAL | Age: 72
End: 2017-07-19

## 2017-07-19 VITALS
OXYGEN SATURATION: 98 % | WEIGHT: 183 LBS | SYSTOLIC BLOOD PRESSURE: 124 MMHG | BODY MASS INDEX: 27.74 KG/M2 | HEART RATE: 54 BPM | TEMPERATURE: 96.9 F | DIASTOLIC BLOOD PRESSURE: 66 MMHG | HEIGHT: 68 IN | RESPIRATION RATE: 16 BRPM

## 2017-07-19 DIAGNOSIS — I48.3 TYPICAL ATRIAL FLUTTER (HCC): ICD-10-CM

## 2017-07-19 DIAGNOSIS — I10 BENIGN ESSENTIAL HYPERTENSION: Primary | ICD-10-CM

## 2017-07-19 PROCEDURE — 99213 OFFICE O/P EST LOW 20 MIN: CPT | Performed by: FAMILY MEDICINE

## 2017-07-19 NOTE — PROGRESS NOTES
"Subjective   Madhu Santoro is a 71 y.o. male.     History of Present Illness   Chief Complaint:   Chief Complaint   Patient presents with   • Hypertension     3 month follow up   • Atrial Flutter       Madhu Santoro 71 y.o. male who presents today for a 3 month follow up for Paroxysmal typical atrial flutter.  he has a history of   Patient Active Problem List   Diagnosis   • Anxiety   • Arthritis   • Chronic coronary artery disease   • HLD (hyperlipidemia)   • Benign essential hypertension   • DDD (degenerative disc disease), lumbosacral   • Cerebrovascular accident   • Encounter for screening for cardiovascular disorders   • Gastroesophageal reflux disease   • Hyperlipidemia   • Stenosis of carotid artery   • Former smoker   • History of cardiac catheterization   • S/P ablation of atrial flutter   • Typical atrial flutter   • S/P CABG (coronary artery bypass graft)   .  Since the last visit, he has overall felt well.  he has been compliant with   Current Outpatient Prescriptions:   •  aspirin 81 MG tablet, Take 81 mg by mouth daily., Disp: , Rfl:   •  clopidogrel (PLAVIX) 75 MG tablet, Take 75 mg by mouth Daily., Disp: , Rfl:   •  Hydrocodone-Acetaminophen (VICODIN) 5-300 MG per tablet, Take 1 tablet by mouth 2 (Two) Times a Day., Disp: 60 tablet, Rfl: 0  •  nitroglycerin (NITROSTAT) 0.4 MG SL tablet, Place 1 tablet under the tongue Every 5 (Five) Minutes As Needed for chest pain. Take no more than 3 doses in 15 minutes., Disp: 20 tablet, Rfl: 1  •  omeprazole (priLOSEC) 20 MG capsule, Take 1 capsule by mouth Daily., Disp: 90 capsule, Rfl: 1  •  ramipril (ALTACE) 5 MG capsule, Take 2 capsules by mouth Daily., Disp: 60 capsule, Rfl: 0  •  rosuvastatin (CRESTOR) 10 MG tablet, Take 1 tablet by mouth Daily., Disp: 90 tablet, Rfl: 1.  he denies medication side effects.    All of the chronic condition(s) listed above are stable w/o issues.    /66  Pulse 54  Temp 96.9 °F (36.1 °C) (Oral)   Resp 16  Ht 68\" " (172.7 cm)  Wt 183 lb (83 kg)  SpO2 98%  BMI 27.83 kg/m2    The following portions of the patient's history were reviewed and updated as appropriate: allergies, current medications, past family history, past medical history, past social history, past surgical history and problem list.    Review of Systems   Constitutional: Negative for activity change, appetite change and unexpected weight change.   Eyes: Negative for visual disturbance.   Respiratory: Negative for chest tightness and shortness of breath.    Cardiovascular: Negative for chest pain and palpitations.   Skin: Negative for color change.   Neurological: Negative for syncope and speech difficulty.   Psychiatric/Behavioral: Negative for confusion and decreased concentration.       Objective   Physical Exam   Constitutional: He is oriented to person, place, and time. He appears well-developed and well-nourished.   Eyes: EOM are normal. Pupils are equal, round, and reactive to light.   Neck: Normal range of motion. Neck supple.   Cardiovascular: Normal rate and regular rhythm.    Pulmonary/Chest: Effort normal and breath sounds normal.   Abdominal: Soft.   Musculoskeletal: Normal range of motion.   Neurological: He is alert and oriented to person, place, and time.   Skin: Skin is warm and dry.   Psychiatric: He has a normal mood and affect.   Nursing note and vitals reviewed.      Assessment/Plan   Madhu was seen today for hypertension and atrial flutter.    Diagnoses and all orders for this visit:    Benign essential hypertension    Typical atrial flutter

## 2017-07-21 ENCOUNTER — APPOINTMENT (OUTPATIENT)
Dept: CARDIAC REHAB | Facility: HOSPITAL | Age: 72
End: 2017-07-21

## 2017-07-24 ENCOUNTER — APPOINTMENT (OUTPATIENT)
Dept: CARDIAC REHAB | Facility: HOSPITAL | Age: 72
End: 2017-07-24

## 2017-07-24 NOTE — PROGRESS NOTES
Date of Office Visit: 2017  Encounter Provider: Eric Wright MD  Place of Service: [facility] CARDIOLOGY  Patient Name: Madhu Santoro  :1945    Chief complaint: Follow-up for coronary artery disease, atrial flutter status   post ablation, and carotid artery disease.    History of Present Illness:    Dear Dr. Chen:     I again had the pleasure of seeing your patient in cardiology office on 2017.  As you   well know, he is a very pleasant 71 year-old white male with a past medical history   significant for coronary artery disease, prior carotid endarterectomy, and atrial flutter   status post ablation who presents for follow-up.  The patient formerly followed with Dr. Roger from the [facility].  He has a history of a carotid endarterectomy   in the past, and also has a history of a stroke for which he took Plavix for many years.    He presented to the [facility] emergency department on 2017 after   his primary care physician noted that he had a rapid heart rate of 150 bpm.  He was   found to be in typical atrial flutter at the time, which was a new diagnosis for him, although   he was completely asymptomatic with regards to the atrial flutter and was unaware that   he was in this.  He also had reported that he had experienced some chest pressure   radiating into his throat earlier in the week.  He was converted to sinus rhythm with an   amiodarone drip, and an echocardiogram on 2017 showed an ejection fraction of   55-60%, and no significant valvular disease.  He ultimately underwent a diagnostic left   heart catheterization on 4/10/2017 by Dr. Healy for presumed unstable angina and a   known history of coronary artery disease.  This showed severe coronary artery disease,   including a distal 80% severely calcified lesion in the left main extending into the ostium   of the left circumflex coronary artery.  He was then  referred for a coronary artery bypass   grafting operation, and underwent this by Dr. Cortez on 4/11/2017.  At that time, he had   a LIMA to LAD, SVG to OM1, and SVG to OM 3.  He did well postoperatively, but continued   to have significant issues with rapid atrial flutter which was largely unresponsive to   amiodarone at the time.  He ultimately underwent a cavotricuspid isthmus atrial flutter   ablation by Dr. Gustafson on 4/18/2017.  A preoperative AR performed the day prior to   this did not show any evidence for thrombus formation.  He did develop bradycardia on   even minimal doses of metoprolol as an outpatient, and this had to be discontinued.     The patient presents today for follow-up.  His only complaint is that he has had significant   bruising recently since restarting his Plavix.  He takes the Plavix for his history of stroke in   the past,, but is also taking aspirin currently.  He also had some issues with hypotension   recently, and decreased his ramipril dose from 10 mg per day to 5 mg per day.  He states   that this has improved his blood pressure.  He has not had any chest discomfort.  He is   mowing his grass and doing activity without any difficulty.    Past Medical History:   Diagnosis Date   • Anxiety    • Arthritis    • Atrial flutter     Status post cavotricuspid isthmus ablation by Dr. Gustafson on 4/18/17   • Benign essential hypertension    • CAD (coronary artery disease)     3 vessel CABG 4/11/17 by Dr. Cortez: ROSARIO-prox LAD, SVG-OM1, SVG-OM3   • Carotid artery disease     Status post carotid endarterectomy - [facility] 4/10/17: 50-59% NICHELLE, 1-15% LICA.    • Colonic polyp    • DDD (degenerative disc disease), lumbosacral    • H/O bone density study 2013   • H/O complete eye exam 2014   • HLD (hyperlipidemia)    • Hypertension    • Lipid screening 05/31/2013   • Low back pain     physical therapy [facility] rehab 5-12-10   • Screening for prostate cancer 07/07/2015   • Stroke        Past  Surgical History:   Procedure Laterality Date   • CARDIAC CATHETERIZATION N/A 4/10/2017    Procedure: Left Heart Cath;  Surgeon: Marjorie Healy MD;  Location: [facility] CATH INVASIVE LOCATION;  Service:    • CARDIAC CATHETERIZATION N/A 4/10/2017    Procedure: Coronary angiography;  Surgeon: Marjorie Healy MD;  Location: [facility] CATH INVASIVE LOCATION;  Service:    • CARDIAC CATHETERIZATION N/A 4/10/2017    Procedure: Left ventriculography;  Surgeon: Marjorie Healy MD;  Location: [facility] CATH INVASIVE LOCATION;  Service:    • CARDIAC ELECTROPHYSIOLOGY PROCEDURE N/A 4/18/2017    Procedure: Ablation atrial flutter;  Surgeon: Jose Antonio Gustafson MD;  Location: [facility] CATH INVASIVE LOCATION;  Service:    • COLONOSCOPY  2010   • CORONARY ARTERY BYPASS GRAFT N/A 4/11/2017    Procedure: AR STERNOTOMY CORONARY ARTERY BYPASS GRAFT TIMES 3 USING LEFT INTERNAL MAMMARY ARTERY AND LEFT GREATER SAPHENOUS VEIN GRAFT PER ENDOSCOPIC VEIN HARVESTING AND PRP ;  Surgeon: Temo Cortez MD;  Location: [facility];  Service:        Current Outpatient Prescriptions on File Prior to Visit   Medication Sig Dispense Refill   • clopidogrel (PLAVIX) 75 MG tablet Take 75 mg by mouth Daily.     • Hydrocodone-Acetaminophen (VICODIN) 5-300 MG per tablet Take 1 tablet by mouth 2 (Two) Times a Day. 60 tablet 0   • nitroglycerin (NITROSTAT) 0.4 MG SL tablet Place 1 tablet under the tongue Every 5 (Five) Minutes As Needed for chest pain. Take no more than 3 doses in 15 minutes. 20 tablet 1   • omeprazole (priLOSEC) 20 MG capsule Take 1 capsule by mouth Daily. 90 capsule 1   • ramipril (ALTACE) 5 MG capsule Take 2 capsules by mouth Daily. 60 capsule 0   • rosuvastatin (CRESTOR) 10 MG tablet Take 1 tablet by mouth Daily. 90 tablet 1     No current facility-administered medications on file prior to visit.      Allergies as of 07/18/2017 - Chris as Reviewed 07/18/2017   Allergen Reaction Noted   • Penicillins  10/05/2016     Social History     Social  "History   • Marital status:      Spouse name: N/A   • Number of children: N/A   • Years of education: N/A     Occupational History   • Not on file.     Social History Main Topics   • Smoking status: Former Smoker   • Smokeless tobacco: Never Used   • Alcohol use No   • Drug use: No   • Sexual activity: Yes     Partners: Female     Other Topics Concern   • Not on file     Social History Narrative     Family History   Problem Relation Age of Onset   • Heart disease Mother    • Heart attack Mother    • Stroke Mother    • Heart disease Father    • Heart attack Father    • Stroke Father    • Arthritis Other    • Diabetes Other    • Hypertension Other    • Kidney disease Other      stones   • Hypertension Sister    • Heart attack Brother    • Heart disease Brother    • No Known Problems Maternal Grandmother    • No Known Problems Maternal Grandfather    • No Known Problems Paternal Grandmother    • No Known Problems Paternal Grandfather    • No Known Problems Brother    • Heart disease Brother    • Heart attack Brother    • Diabetes Brother        Review of Systems   Hematologic/Lymphatic: Bruises/bleeds easily.   Musculoskeletal: Positive for joint pain.   All other systems reviewed and are negative.    Objective:     Vitals:    07/18/17 1112   BP: 110/74   Pulse: 60   SpO2: 98%   Weight: 182 lb (82.6 kg)   Height: 68\" (172.7 cm)     Body mass index is 27.67 kg/(m^2).    Physical Exam   Constitutional: He is oriented to person, place, and time. He appears well-developed and well-nourished.   HENT:   Head: Normocephalic and atraumatic.   Eyes: Conjunctivae are normal.   Neck: Neck supple.   Cardiovascular: Normal rate and regular rhythm.  Exam reveals no gallop and no friction rub.    No murmur heard.  Pulmonary/Chest: Effort normal and breath sounds normal.   Abdominal: Soft. There is no tenderness.   Musculoskeletal: He exhibits no edema.   Neurological: He is alert and oriented to person, place, and time. "   Skin: Skin is warm.   Psychiatric: He has a normal mood and affect. His behavior is normal.     Lab Review:   Procedures    Cardiac Procedures:  1.  Echocardiogram on 4/8/2017: The ejection fraction was 55-60%.  Left atrium was   mildly to moderately dilated.  There was mild mitral regurgitation.  2.  Left heart catheterization on 4/10/2017: There was a distal 80% and severely   calcified stenosis in the left main extending into the ostium of left circumflex.  The left   circumflex was dominant, and did have the before mentioned 80% lesion in the ostial   aspect extending from the left main.  The LAD had 20% proximal disease.  The right   coronary artery was small, nondominant, and normal.  3.  Status post 3 vessel coronary artery bypass grafting on 4/11/2017 by Dr. Cortez:   LIMA to proximal LAD, SVG to OM 1, and SVG to OM 3.  4.  Transesophageal echo on 4/17/2017: The ejection fraction was 55%.  There was   moderate left atrial enlargement and mild to moderate right atrial enlargement.  The   atrial septum was redundant, but no PFO was seen on the saline study.  There was   no significant valvular disease.  5.  Status post cavotricuspid isthmus ablation for typical atrial flutter on 4/18/2017 by Dr. Gustafson.  6.  Carotid Doppler ultrasound on 4/10/2017: There was 50-59% stenosis in the right   internal carotid artery.  There was 1-15% disease in the left internal carotid artery.    Assessment:       Diagnosis Plan   1. Coronary artery disease involving coronary bypass graft of native heart without angina pectoris     2. Status post catheter ablation of atrial flutter     3. Right-sided carotid artery disease       Plan:       The patient is doing very well.  Again, he did have some hypotension which has   improved since decreasing his dose of ramipril.  He is not on beta blockers as he   developed significant bradycardia on even minimal doses in the past.  He did   complete most of cardiac rehabilitation.   He is exerting himself and doing yard   work without any difficulty currently.  He is having significant bruising with both   the aspirin and Plavix.  He takes the Plavix for his history of stroke, as well as his   history of carotid artery disease.  I advised him that he can remain on the Plavix   and discontinue the aspirin at this point to see if the bleeding will improve.  He   obviously needs to be on antiplatelet therapy given the coronary artery disease.    He will also continue on the Crestor at 10 mg per day.  He has not had any noted   atrial fibrillation, and he did complete one month of Xarelto after his atrial flutter   ablation.  He will need another carotid artery Doppler ultrasound approximately   one year from the previous (in April 2018).  I will have him follow-up with me in   the next 4 months unless other issues arise.    Coronary Artery Disease  Assessment  • The patient has no angina   Plan  • Lifestyle modifications discussed include adhering to a heart healthy diet, avoidance of tobacco products, maintenance of a healthy weight, medication compliance, regular exercise and regular monitoring of cholesterol and blood pressure   Subjective - Objective  • There is a history of previous coronary artery bypass graft on or around 4/11/2017  • Current antiplatelet therapy includes aspirin 81 mg

## 2017-07-26 ENCOUNTER — APPOINTMENT (OUTPATIENT)
Dept: CARDIAC REHAB | Facility: HOSPITAL | Age: 72
End: 2017-07-26

## 2017-07-28 ENCOUNTER — APPOINTMENT (OUTPATIENT)
Dept: CARDIAC REHAB | Facility: HOSPITAL | Age: 72
End: 2017-07-28

## 2017-07-31 ENCOUNTER — APPOINTMENT (OUTPATIENT)
Dept: CARDIAC REHAB | Facility: HOSPITAL | Age: 72
End: 2017-07-31

## 2017-08-02 ENCOUNTER — APPOINTMENT (OUTPATIENT)
Dept: CARDIAC REHAB | Facility: HOSPITAL | Age: 72
End: 2017-08-02

## 2017-08-04 ENCOUNTER — APPOINTMENT (OUTPATIENT)
Dept: CARDIAC REHAB | Facility: HOSPITAL | Age: 72
End: 2017-08-04

## 2017-08-07 ENCOUNTER — APPOINTMENT (OUTPATIENT)
Dept: CARDIAC REHAB | Facility: HOSPITAL | Age: 72
End: 2017-08-07

## 2017-08-09 ENCOUNTER — APPOINTMENT (OUTPATIENT)
Dept: CARDIAC REHAB | Facility: HOSPITAL | Age: 72
End: 2017-08-09

## 2017-08-10 ENCOUNTER — OFFICE VISIT (OUTPATIENT)
Dept: FAMILY MEDICINE CLINIC | Facility: CLINIC | Age: 72
End: 2017-08-10

## 2017-08-10 VITALS
DIASTOLIC BLOOD PRESSURE: 71 MMHG | RESPIRATION RATE: 16 BRPM | HEART RATE: 56 BPM | SYSTOLIC BLOOD PRESSURE: 118 MMHG | BODY MASS INDEX: 27.28 KG/M2 | TEMPERATURE: 97.4 F | WEIGHT: 180 LBS | HEIGHT: 68 IN | OXYGEN SATURATION: 98 %

## 2017-08-10 DIAGNOSIS — K21.9 GASTROESOPHAGEAL REFLUX DISEASE, ESOPHAGITIS PRESENCE NOT SPECIFIED: ICD-10-CM

## 2017-08-10 DIAGNOSIS — M51.37 DDD (DEGENERATIVE DISC DISEASE), LUMBOSACRAL: ICD-10-CM

## 2017-08-10 DIAGNOSIS — E78.2 MIXED HYPERLIPIDEMIA: ICD-10-CM

## 2017-08-10 DIAGNOSIS — I25.10 CORONARY ARTERY DISEASE DUE TO LIPID RICH PLAQUE: ICD-10-CM

## 2017-08-10 DIAGNOSIS — R39.14 FEELING OF INCOMPLETE BLADDER EMPTYING: ICD-10-CM

## 2017-08-10 DIAGNOSIS — I25.83 CORONARY ARTERY DISEASE DUE TO LIPID RICH PLAQUE: ICD-10-CM

## 2017-08-10 PROCEDURE — 99214 OFFICE O/P EST MOD 30 MIN: CPT | Performed by: FAMILY MEDICINE

## 2017-08-10 RX ORDER — ROSUVASTATIN CALCIUM 10 MG/1
10 TABLET, COATED ORAL DAILY
Qty: 30 TABLET | Refills: 0 | Status: SHIPPED | OUTPATIENT
Start: 2017-08-10 | End: 2017-10-30 | Stop reason: SDUPTHER

## 2017-08-10 RX ORDER — CLOPIDOGREL BISULFATE 75 MG/1
75 TABLET ORAL DAILY
Qty: 30 TABLET | Refills: 0 | Status: SHIPPED | OUTPATIENT
Start: 2017-08-10 | End: 2017-11-06 | Stop reason: SDUPTHER

## 2017-08-10 RX ORDER — RAMIPRIL 5 MG/1
10 CAPSULE ORAL DAILY
Qty: 60 CAPSULE | Refills: 0 | Status: SHIPPED | OUTPATIENT
Start: 2017-08-10 | End: 2017-11-06 | Stop reason: SDUPTHER

## 2017-08-10 RX ORDER — OMEPRAZOLE 20 MG/1
20 CAPSULE, DELAYED RELEASE ORAL DAILY
Qty: 30 CAPSULE | Refills: 0 | Status: SHIPPED | OUTPATIENT
Start: 2017-08-10 | End: 2017-11-01 | Stop reason: SDUPTHER

## 2017-08-10 RX ORDER — HYDROCODONE BITARTRATE AND ACETAMINOPHEN 5; 300 MG/1; MG/1
1 TABLET ORAL 2 TIMES DAILY
Qty: 60 TABLET | Refills: 0 | Status: SHIPPED | OUTPATIENT
Start: 2017-08-10 | End: 2017-09-11 | Stop reason: SDUPTHER

## 2017-08-10 NOTE — PATIENT INSTRUCTIONS
Exercise 30 minutes most days of the week  Sleep 6-8 hours each night if possible  Low fat, low cholesterol diet   we discussed prescribed medications and how to take them   make sure you get results of any labs/studies ordered today  Low glycemic index diet      Patients must be seen every 3 months for their presription medication review and refill required by [place] law.  Patient has been advised on the potential for addiction  and dependence to their prescribed scheduled medication.  RUTH was obtained today and reviewed. This was scanned into the patient's chart.

## 2017-08-10 NOTE — PROGRESS NOTES
Subjective   Madhu Santoro is a 71 y.o. male.     History of Present Illness   Chief Complaint:   Chief Complaint   Patient presents with   • Back Pain       Madhu Santoro 71 y.o. male who presents today for Medical Management of the below listed issues and medication refills. I refilled pain medication which is vicodin 5-300mg. Doing well with this medication and for severe back pain.  The patient has read and signed the [facility] Controlled Substance Contract.  I will continue to see patient for regular follow up appointments.  They are well controlled on their medication.  RUTH has been reviewed by me and is updated every 3 months. The patient is aware of the potential for addiction and dependence.  Patient Active Problem List   Diagnosis   • Anxiety   • Arthritis   • Chronic coronary artery disease   • HLD (hyperlipidemia)   • Benign essential hypertension   • DDD (degenerative disc disease), lumbosacral   • Cerebrovascular accident   • Encounter for screening for cardiovascular disorders   • Gastroesophageal reflux disease   • Hyperlipidemia   • Stenosis of carotid artery   • Former smoker   • History of cardiac catheterization   • S/P ablation of atrial flutter   • Typical atrial flutter   • S/P CABG (coronary artery bypass graft)   .  Since the last visit, he has overall felt well.  he has been compliant with   Current Outpatient Prescriptions:   •  clopidogrel (PLAVIX) 75 MG tablet, Take 75 mg by mouth Daily., Disp: , Rfl:   •  Hydrocodone-Acetaminophen (VICODIN) 5-300 MG per tablet, Take 1 tablet by mouth 2 (Two) Times a Day., Disp: 60 tablet, Rfl: 0  •  nitroglycerin (NITROSTAT) 0.4 MG SL tablet, Place 1 tablet under the tongue Every 5 (Five) Minutes As Needed for chest pain. Take no more than 3 doses in 15 minutes., Disp: 20 tablet, Rfl: 1  •  omeprazole (priLOSEC) 20 MG capsule, Take 1 capsule by mouth Daily., Disp: 90 capsule, Rfl: 1  •  ramipril (ALTACE) 5 MG capsule, Take 2 capsules by  "mouth Daily., Disp: 60 capsule, Rfl: 0  •  rosuvastatin (CRESTOR) 10 MG tablet, Take 1 tablet by mouth Daily., Disp: 90 tablet, Rfl: 1.  he denies medication side effects.    All of the chronic condition(s) listed above are stable w/o issues.    /71  Pulse 56  Temp 97.4 °F (36.3 °C) (Oral)   Resp 16  Ht 68\" (172.7 cm)  Wt 180 lb (81.6 kg)  SpO2 98%  BMI 27.37 kg/m2        The following portions of the patient's history were reviewed and updated as appropriate: allergies, current medications, past family history, past medical history, past social history, past surgical history and problem list.    Review of Systems    Objective   Physical Exam   Constitutional: He is oriented to person, place, and time. He appears well-developed and well-nourished.   HENT:   Head: Atraumatic.   Mouth/Throat: Oropharynx is clear and moist.   Eyes: EOM are normal. Pupils are equal, round, and reactive to light.   Neck: Normal range of motion. Neck supple. No thyromegaly present.   Cardiovascular: Normal rate and regular rhythm.    Pulmonary/Chest: Effort normal and breath sounds normal.   Abdominal: Soft.   Musculoskeletal: Normal range of motion.   Neurological: He is alert and oriented to person, place, and time.   Skin: Skin is warm and dry.   Psychiatric: He has a normal mood and affect. His behavior is normal.   Nursing note and vitals reviewed.      Assessment/Plan   Madhu was seen today for back pain.    Diagnoses and all orders for this visit:    DDD (degenerative disc disease), lumbosacral  -     Hydrocodone-Acetaminophen (VICODIN) 5-300 MG per tablet; Take 1 tablet by mouth 2 (Two) Times a Day.  -     Comprehensive metabolic panel; Future  -     Lipid panel; Future  -     CBC and Differential; Future  -     TSH; Future  -     PSA; Future    Coronary artery disease due to lipid rich plaque  -     ramipril (ALTACE) 5 MG capsule; Take 2 capsules by mouth Daily.  -     Comprehensive metabolic panel; Future  -     " Lipid panel; Future  -     CBC and Differential; Future  -     TSH; Future  -     PSA; Future    Gastroesophageal reflux disease, esophagitis presence not specified  -     omeprazole (priLOSEC) 20 MG capsule; Take 1 capsule by mouth Daily.  -     Comprehensive metabolic panel; Future  -     Lipid panel; Future  -     CBC and Differential; Future  -     TSH; Future  -     PSA; Future    Mixed hyperlipidemia  -     rosuvastatin (CRESTOR) 10 MG tablet; Take 1 tablet by mouth Daily.  -     Comprehensive metabolic panel; Future  -     Lipid panel; Future  -     CBC and Differential; Future  -     TSH; Future  -     PSA; Future    Feeling of incomplete bladder emptying   -     PSA; Future    Other orders  -     clopidogrel (PLAVIX) 75 MG tablet; Take 1 tablet by mouth Daily.

## 2017-08-14 ENCOUNTER — APPOINTMENT (OUTPATIENT)
Dept: CARDIAC REHAB | Facility: HOSPITAL | Age: 72
End: 2017-08-14

## 2017-09-11 DIAGNOSIS — M51.37 DDD (DEGENERATIVE DISC DISEASE), LUMBOSACRAL: ICD-10-CM

## 2017-09-15 RX ORDER — HYDROCODONE BITARTRATE AND ACETAMINOPHEN 5; 300 MG/1; MG/1
1 TABLET ORAL 2 TIMES DAILY
Qty: 60 TABLET | Refills: 0 | Status: SHIPPED | OUTPATIENT
Start: 2017-09-15 | End: 2017-10-17 | Stop reason: SDUPTHER

## 2017-10-17 ENCOUNTER — TELEPHONE (OUTPATIENT)
Dept: CARDIOLOGY | Facility: CLINIC | Age: 72
End: 2017-10-17

## 2017-10-17 DIAGNOSIS — R06.02 SHORTNESS OF BREATH: Primary | ICD-10-CM

## 2017-10-17 DIAGNOSIS — I25.810 CORONARY ARTERY DISEASE INVOLVING CORONARY BYPASS GRAFT OF NATIVE HEART WITHOUT ANGINA PECTORIS: ICD-10-CM

## 2017-10-17 DIAGNOSIS — M51.37 DDD (DEGENERATIVE DISC DISEASE), LUMBOSACRAL: ICD-10-CM

## 2017-10-17 RX ORDER — HYDROCODONE BITARTRATE AND ACETAMINOPHEN 5; 300 MG/1; MG/1
1 TABLET ORAL 2 TIMES DAILY
Qty: 60 TABLET | Refills: 0 | Status: SHIPPED | OUTPATIENT
Start: 2017-10-17 | End: 2017-11-22 | Stop reason: SDUPTHER

## 2017-10-17 NOTE — TELEPHONE ENCOUNTER
Jaime,    I spoke with him on the phone.  The dyspnea is not severe, but a change.  I have ordered an echo to start.  Thanks    MARYBEL

## 2017-10-17 NOTE — TELEPHONE ENCOUNTER
10/17/17@ 11:19 AM   Pt called he has had a little bit of SOA. He states it started last week and has had it going this week. He states it not bad but just noticeable.   Please advise   Pt's call back # [phone]   Thanks Jaime bryant

## 2017-10-18 ENCOUNTER — TELEPHONE (OUTPATIENT)
Dept: CARDIOLOGY | Facility: CLINIC | Age: 72
End: 2017-10-18

## 2017-10-24 ENCOUNTER — HOSPITAL ENCOUNTER (OUTPATIENT)
Dept: CARDIOLOGY | Facility: HOSPITAL | Age: 72
Discharge: HOME | End: 2017-10-24
Attending: INTERNAL MEDICINE | Admitting: INTERNAL MEDICINE

## 2017-10-24 VITALS
BODY MASS INDEX: 27.28 KG/M2 | HEART RATE: 52 BPM | WEIGHT: 180 LBS | DIASTOLIC BLOOD PRESSURE: 70 MMHG | SYSTOLIC BLOOD PRESSURE: 160 MMHG | HEIGHT: 68 IN

## 2017-10-24 DIAGNOSIS — R06.02 SHORTNESS OF BREATH: Primary | ICD-10-CM

## 2017-10-24 DIAGNOSIS — E78.2 MIXED HYPERLIPIDEMIA: ICD-10-CM

## 2017-10-24 DIAGNOSIS — I25.810 CORONARY ARTERY DISEASE INVOLVING CORONARY BYPASS GRAFT OF NATIVE HEART WITHOUT ANGINA PECTORIS: ICD-10-CM

## 2017-10-24 DIAGNOSIS — R06.02 SHORTNESS OF BREATH: ICD-10-CM

## 2017-10-24 LAB
BH CV ECHO MEAS - ACS: 2.1 CM
BH CV ECHO MEAS - AO MAX PG (FULL): 3.3 MMHG
BH CV ECHO MEAS - AO MAX PG: 5.5 MMHG
BH CV ECHO MEAS - AO MEAN PG (FULL): 1.9 MMHG
BH CV ECHO MEAS - AO MEAN PG: 3.3 MMHG
BH CV ECHO MEAS - AO ROOT AREA (BSA CORRECTED): 1.6
BH CV ECHO MEAS - AO ROOT AREA: 8.1 CM^2
BH CV ECHO MEAS - AO ROOT DIAM: 3.2 CM
BH CV ECHO MEAS - AO V2 MAX: 116.7 CM/SEC
BH CV ECHO MEAS - AO V2 MEAN: 85.3 CM/SEC
BH CV ECHO MEAS - AO V2 VTI: 29.1 CM
BH CV ECHO MEAS - AVA(I,A): 1.8 CM^2
BH CV ECHO MEAS - AVA(I,D): 1.8 CM^2
BH CV ECHO MEAS - AVA(V,A): 1.7 CM^2
BH CV ECHO MEAS - AVA(V,D): 1.7 CM^2
BH CV ECHO MEAS - BSA(HAYCOCK): 2 M^2
BH CV ECHO MEAS - BSA: 2 M^2
BH CV ECHO MEAS - BZI_BMI: 27.4 KILOGRAMS/M^2
BH CV ECHO MEAS - BZI_METRIC_HEIGHT: 172.7 CM
BH CV ECHO MEAS - BZI_METRIC_WEIGHT: 81.6 KG
BH CV ECHO MEAS - CONTRAST EF (2CH): 62.8 ML/M^2
BH CV ECHO MEAS - CONTRAST EF 4CH: 55.8 ML/M^2
BH CV ECHO MEAS - EDV(MOD-SP2): 43 ML
BH CV ECHO MEAS - EDV(MOD-SP4): 43 ML
BH CV ECHO MEAS - EDV(TEICH): 89.5 ML
BH CV ECHO MEAS - EF(CUBED): 86.9 %
BH CV ECHO MEAS - EF(MOD-SP2): 62.8 %
BH CV ECHO MEAS - EF(MOD-SP4): 55.8 %
BH CV ECHO MEAS - EF(TEICH): 80.7 %
BH CV ECHO MEAS - ESV(MOD-SP2): 16 ML
BH CV ECHO MEAS - ESV(MOD-SP4): 19 ML
BH CV ECHO MEAS - ESV(TEICH): 17.2 ML
BH CV ECHO MEAS - FS: 49.2 %
BH CV ECHO MEAS - IVS/LVPW: 1
BH CV ECHO MEAS - IVSD: 0.97 CM
BH CV ECHO MEAS - LAT PEAK E' VEL: 9 CM/SEC
BH CV ECHO MEAS - LV DIASTOLIC VOL/BSA (35-75): 22 ML/M^2
BH CV ECHO MEAS - LV MASS(C)D: 139.4 GRAMS
BH CV ECHO MEAS - LV MASS(C)DI: 71.3 GRAMS/M^2
BH CV ECHO MEAS - LV MAX PG: 2.1 MMHG
BH CV ECHO MEAS - LV MEAN PG: 1.4 MMHG
BH CV ECHO MEAS - LV SYSTOLIC VOL/BSA (12-30): 9.7 ML/M^2
BH CV ECHO MEAS - LV V1 MAX: 72.8 CM/SEC
BH CV ECHO MEAS - LV V1 MEAN: 56.3 CM/SEC
BH CV ECHO MEAS - LV V1 VTI: 18.8 CM
BH CV ECHO MEAS - LVIDD: 4.4 CM
BH CV ECHO MEAS - LVIDS: 2.3 CM
BH CV ECHO MEAS - LVLD AP2: 5.6 CM
BH CV ECHO MEAS - LVLD AP4: 5.8 CM
BH CV ECHO MEAS - LVLS AP2: 5.3 CM
BH CV ECHO MEAS - LVLS AP4: 5 CM
BH CV ECHO MEAS - LVOT AREA (M): 2.8 CM^2
BH CV ECHO MEAS - LVOT AREA: 2.7 CM^2
BH CV ECHO MEAS - LVOT DIAM: 1.9 CM
BH CV ECHO MEAS - LVPWD: 0.93 CM
BH CV ECHO MEAS - MED PEAK E' VEL: 6 CM/SEC
BH CV ECHO MEAS - MV A DUR: 0.13 SEC
BH CV ECHO MEAS - MV A MAX VEL: 88.3 CM/SEC
BH CV ECHO MEAS - MV DEC SLOPE: 440.8 CM/SEC^2
BH CV ECHO MEAS - MV DEC TIME: 0.21 SEC
BH CV ECHO MEAS - MV E MAX VEL: 92.6 CM/SEC
BH CV ECHO MEAS - MV E/A: 1
BH CV ECHO MEAS - MV MAX PG: 3.6 MMHG
BH CV ECHO MEAS - MV MEAN PG: 1.3 MMHG
BH CV ECHO MEAS - MV P1/2T MAX VEL: 93.1 CM/SEC
BH CV ECHO MEAS - MV P1/2T: 61.9 MSEC
BH CV ECHO MEAS - MV V2 MAX: 95 CM/SEC
BH CV ECHO MEAS - MV V2 MEAN: 49.5 CM/SEC
BH CV ECHO MEAS - MV V2 VTI: 36.4 CM
BH CV ECHO MEAS - MVA P1/2T LCG: 2.4 CM^2
BH CV ECHO MEAS - MVA(P1/2T): 3.6 CM^2
BH CV ECHO MEAS - MVA(VTI): 1.4 CM^2
BH CV ECHO MEAS - PA MAX PG (FULL): 2.5 MMHG
BH CV ECHO MEAS - PA MAX PG: 3.7 MMHG
BH CV ECHO MEAS - PA V2 MAX: 96.4 CM/SEC
BH CV ECHO MEAS - PULM A REVS DUR: 0.11 SEC
BH CV ECHO MEAS - PULM A REVS VEL: 24.7 CM/SEC
BH CV ECHO MEAS - PULM DIAS VEL: 62.3 CM/SEC
BH CV ECHO MEAS - PULM S/D: 0.97
BH CV ECHO MEAS - PULM SYS VEL: 60.3 CM/SEC
BH CV ECHO MEAS - PVA(V,A): 1.2 CM^2
BH CV ECHO MEAS - PVA(V,D): 1.2 CM^2
BH CV ECHO MEAS - QP/QS: 0.57
BH CV ECHO MEAS - RAP SYSTOLE: 3 MMHG
BH CV ECHO MEAS - RV MAX PG: 1.2 MMHG
BH CV ECHO MEAS - RV MEAN PG: 0.73 MMHG
BH CV ECHO MEAS - RV V1 MAX: 54.3 CM/SEC
BH CV ECHO MEAS - RV V1 MEAN: 39.7 CM/SEC
BH CV ECHO MEAS - RV V1 VTI: 13.4 CM
BH CV ECHO MEAS - RVOT AREA: 2.2 CM^2
BH CV ECHO MEAS - RVOT DIAM: 1.7 CM
BH CV ECHO MEAS - RVSP: 22 MMHG
BH CV ECHO MEAS - SI(AO): 120.1 ML/M^2
BH CV ECHO MEAS - SI(CUBED): 38.9 ML/M^2
BH CV ECHO MEAS - SI(LVOT): 26.4 ML/M^2
BH CV ECHO MEAS - SI(MOD-SP2): 13.8 ML/M^2
BH CV ECHO MEAS - SI(MOD-SP4): 12.3 ML/M^2
BH CV ECHO MEAS - SI(TEICH): 37 ML/M^2
BH CV ECHO MEAS - SV(AO): 234.8 ML
BH CV ECHO MEAS - SV(CUBED): 76 ML
BH CV ECHO MEAS - SV(LVOT): 51.7 ML
BH CV ECHO MEAS - SV(MOD-SP2): 27 ML
BH CV ECHO MEAS - SV(MOD-SP4): 24 ML
BH CV ECHO MEAS - SV(RVOT): 29.5 ML
BH CV ECHO MEAS - SV(TEICH): 72.3 ML
BH CV ECHO MEAS - TAPSE (>1.6): 1.2 CM2
BH CV ECHO MEAS - TR MAX VEL: 238.9 CM/SEC
BH CV XLRA - RV BASE: 2.8 CM
BH CV XLRA - TDI S': 10 CM/SEC
E/E' RATIO: 13
LEFT ATRIUM VOLUME INDEX: 20 ML/M2
LEFT ATRIUM VOLUME: 39 CM3

## 2017-10-24 PROCEDURE — 93306 TTE W/DOPPLER COMPLETE: CPT | Performed by: INTERNAL MEDICINE

## 2017-10-24 PROCEDURE — 93306 TTE W/DOPPLER COMPLETE: CPT

## 2017-10-24 RX ORDER — ROSUVASTATIN CALCIUM 10 MG/1
TABLET, COATED ORAL
Qty: 90 TABLET | Refills: 0 | OUTPATIENT
Start: 2017-10-24

## 2017-10-26 ENCOUNTER — HOSPITAL ENCOUNTER (OUTPATIENT)
Dept: GENERAL RADIOLOGY | Facility: HOSPITAL | Age: 72
Discharge: HOME | End: 2017-10-26
Attending: INTERNAL MEDICINE | Admitting: INTERNAL MEDICINE

## 2017-10-26 DIAGNOSIS — R06.02 SHORTNESS OF BREATH: ICD-10-CM

## 2017-10-26 PROCEDURE — 71020 HC CHEST PA AND LATERAL: CPT

## 2017-10-30 DIAGNOSIS — E78.2 MIXED HYPERLIPIDEMIA: ICD-10-CM

## 2017-10-30 RX ORDER — ROSUVASTATIN CALCIUM 10 MG/1
TABLET, COATED ORAL
Qty: 15 TABLET | Refills: 0 | Status: SHIPPED | OUTPATIENT
Start: 2017-10-30 | End: 2017-11-06 | Stop reason: SDUPTHER

## 2017-11-01 DIAGNOSIS — K21.9 GASTROESOPHAGEAL REFLUX DISEASE, ESOPHAGITIS PRESENCE NOT SPECIFIED: ICD-10-CM

## 2017-11-01 RX ORDER — OMEPRAZOLE 20 MG/1
CAPSULE, DELAYED RELEASE ORAL
Qty: 5 CAPSULE | Refills: 0 | Status: SHIPPED | OUTPATIENT
Start: 2017-11-01 | End: 2017-11-06 | Stop reason: SDUPTHER

## 2017-11-02 LAB
ALBUMIN SERPL-MCNC: 4.3 G/DL (ref 3.5–5.2)
ALBUMIN/GLOB SERPL: 1.3 G/DL
ALP SERPL-CCNC: 120 U/L (ref 39–117)
ALT SERPL-CCNC: 39 U/L (ref 1–41)
AST SERPL-CCNC: 36 U/L (ref 1–40)
BASOPHILS # BLD AUTO: 0.01 10*3/MM3 (ref 0–0.2)
BASOPHILS NFR BLD AUTO: 0.1 % (ref 0–1.5)
BILIRUB SERPL-MCNC: 1 MG/DL (ref 0.1–1.2)
BUN SERPL-MCNC: 17 MG/DL (ref 8–23)
BUN/CREAT SERPL: 18.1 (ref 7–25)
CALCIUM SERPL-MCNC: 10 MG/DL (ref 8.6–10.5)
CHLORIDE SERPL-SCNC: 100 MMOL/L (ref 98–107)
CHOLEST SERPL-MCNC: 174 MG/DL (ref 0–200)
CO2 SERPL-SCNC: 27.3 MMOL/L (ref 22–29)
CREAT SERPL-MCNC: 0.94 MG/DL (ref 0.76–1.27)
EOSINOPHIL # BLD AUTO: 0.04 10*3/MM3 (ref 0–0.7)
EOSINOPHIL NFR BLD AUTO: 0.5 % (ref 0.3–6.2)
ERYTHROCYTE [DISTWIDTH] IN BLOOD BY AUTOMATED COUNT: 17.5 % (ref 11.5–14.5)
GFR SERPLBLD CREATININE-BSD FMLA CKD-EPI: 79 ML/MIN/1.73
GFR SERPLBLD CREATININE-BSD FMLA CKD-EPI: 96 ML/MIN/1.73
GLOBULIN SER CALC-MCNC: 3.3 GM/DL
GLUCOSE SERPL-MCNC: 80 MG/DL (ref 65–99)
HCT VFR BLD AUTO: 52.8 % (ref 40.4–52.2)
HDLC SERPL-MCNC: 61 MG/DL (ref 40–60)
HGB BLD-MCNC: 16.6 G/DL (ref 13.7–17.6)
IMM GRANULOCYTES # BLD: 0.1 10*3/MM3 (ref 0–0.03)
IMM GRANULOCYTES NFR BLD: 1.3 % (ref 0–0.5)
LDLC SERPL CALC-MCNC: 99 MG/DL (ref 0–100)
LYMPHOCYTES # BLD AUTO: 1.39 10*3/MM3 (ref 0.9–4.8)
LYMPHOCYTES NFR BLD AUTO: 17.9 % (ref 19.6–45.3)
MCH RBC QN AUTO: 30.1 PG (ref 27–32.7)
MCHC RBC AUTO-ENTMCNC: 31.4 G/DL (ref 32.6–36.4)
MCV RBC AUTO: 95.8 FL (ref 79.8–96.2)
MONOCYTES # BLD AUTO: 0.96 10*3/MM3 (ref 0.2–1.2)
MONOCYTES NFR BLD AUTO: 12.4 % (ref 5–12)
NEUTROPHILS # BLD AUTO: 5.25 10*3/MM3 (ref 1.9–8.1)
NEUTROPHILS NFR BLD AUTO: 67.8 % (ref 42.7–76)
PLATELET # BLD AUTO: 390 10*3/MM3 (ref 140–500)
POTASSIUM SERPL-SCNC: 4.9 MMOL/L (ref 3.5–5.2)
PROT SERPL-MCNC: 7.6 G/DL (ref 6–8.5)
PSA SERPL-MCNC: 0.47 NG/ML (ref 0–4)
RBC # BLD AUTO: 5.51 10*6/MM3 (ref 4.6–6)
SODIUM SERPL-SCNC: 141 MMOL/L (ref 136–145)
TRIGL SERPL-MCNC: 70 MG/DL (ref 0–150)
TSH SERPL DL<=0.005 MIU/L-ACNC: 2.89 MIU/ML (ref 0.27–4.2)
VLDLC SERPL CALC-MCNC: 14 MG/DL (ref 5–40)
WBC # BLD AUTO: 7.75 10*3/MM3 (ref 4.5–10.7)

## 2017-11-06 ENCOUNTER — OFFICE VISIT (OUTPATIENT)
Dept: FAMILY MEDICINE CLINIC | Facility: CLINIC | Age: 72
End: 2017-11-06

## 2017-11-06 VITALS
HEART RATE: 55 BPM | OXYGEN SATURATION: 98 % | RESPIRATION RATE: 16 BRPM | DIASTOLIC BLOOD PRESSURE: 80 MMHG | WEIGHT: 179 LBS | BODY MASS INDEX: 27.13 KG/M2 | SYSTOLIC BLOOD PRESSURE: 144 MMHG | TEMPERATURE: 96.9 F | HEIGHT: 68 IN

## 2017-11-06 DIAGNOSIS — M51.37 DDD (DEGENERATIVE DISC DISEASE), LUMBOSACRAL: Chronic | ICD-10-CM

## 2017-11-06 DIAGNOSIS — I25.10 CORONARY ARTERY DISEASE DUE TO LIPID RICH PLAQUE: ICD-10-CM

## 2017-11-06 DIAGNOSIS — I10 BENIGN ESSENTIAL HYPERTENSION: Primary | ICD-10-CM

## 2017-11-06 DIAGNOSIS — I25.83 CORONARY ARTERY DISEASE DUE TO LIPID RICH PLAQUE: ICD-10-CM

## 2017-11-06 DIAGNOSIS — R22.42 MASS OF LEFT LOWER LEG: ICD-10-CM

## 2017-11-06 DIAGNOSIS — I63.9 CEREBROVASCULAR ACCIDENT (CVA), UNSPECIFIED MECHANISM (HCC): ICD-10-CM

## 2017-11-06 DIAGNOSIS — K21.9 GASTROESOPHAGEAL REFLUX DISEASE, ESOPHAGITIS PRESENCE NOT SPECIFIED: ICD-10-CM

## 2017-11-06 DIAGNOSIS — E78.2 MIXED HYPERLIPIDEMIA: ICD-10-CM

## 2017-11-06 PROCEDURE — 73590 X-RAY EXAM OF LOWER LEG: CPT | Performed by: FAMILY MEDICINE

## 2017-11-06 PROCEDURE — 99214 OFFICE O/P EST MOD 30 MIN: CPT | Performed by: FAMILY MEDICINE

## 2017-11-06 RX ORDER — ROSUVASTATIN CALCIUM 10 MG/1
10 TABLET, COATED ORAL DAILY
Qty: 30 TABLET | Refills: 5 | Status: SHIPPED | OUTPATIENT
Start: 2017-11-06 | End: 2018-05-09 | Stop reason: SDUPTHER

## 2017-11-06 RX ORDER — CLOPIDOGREL BISULFATE 75 MG/1
75 TABLET ORAL DAILY
Qty: 30 TABLET | Refills: 5 | Status: SHIPPED | OUTPATIENT
Start: 2017-11-06 | End: 2018-05-23 | Stop reason: SDUPTHER

## 2017-11-06 RX ORDER — HYDROCODONE BITARTRATE AND ACETAMINOPHEN 5; 300 MG/1; MG/1
1 TABLET ORAL 2 TIMES DAILY
Qty: 60 TABLET | Refills: 0 | Status: CANCELLED | OUTPATIENT
Start: 2017-11-06

## 2017-11-06 RX ORDER — RAMIPRIL 5 MG/1
5 CAPSULE ORAL DAILY
Qty: 30 CAPSULE | Refills: 5 | Status: SHIPPED | OUTPATIENT
Start: 2017-11-06 | End: 2018-05-09 | Stop reason: SDUPTHER

## 2017-11-06 RX ORDER — OMEPRAZOLE 20 MG/1
20 CAPSULE, DELAYED RELEASE ORAL DAILY
Qty: 30 CAPSULE | Refills: 5 | Status: SHIPPED | OUTPATIENT
Start: 2017-11-06 | End: 2018-02-23 | Stop reason: ALTCHOICE

## 2017-11-06 NOTE — PATIENT INSTRUCTIONS
Exercise 30 minutes most days of the week  Sleep 6-8 hours each night if possible  Low fat, low cholesterol diet   we discussed prescribed medications and how to take them   make sure you get results of any labs/studies ordered today  Low glycemic index diet     Will order adarsh lle   See me 2 weeks

## 2017-11-06 NOTE — PROGRESS NOTES
"Subjective   Madhu Santoro is a 71 y.o. male.     History of Present Illness   Madhu Santoro 71 y.o. male who presents today for routine follow up check and medication refills. I reviewed his lab results.  he has a history of   Patient Active Problem List   Diagnosis   • Anxiety   • Arthritis   • Chronic coronary artery disease   • HLD (hyperlipidemia)   • Benign essential hypertension   • DDD (degenerative disc disease), lumbosacral   • Cerebrovascular accident   • Encounter for screening for cardiovascular disorders   • Gastroesophageal reflux disease   • Hyperlipidemia   • Stenosis of carotid artery   • Former smoker   • History of cardiac catheterization   • S/P ablation of atrial flutter   • Typical atrial flutter   • S/P CABG (coronary artery bypass graft)   .  Since the last visit, he has overall felt well.  He has Hypertenision and is well controlled on medication and GERD and is well controlled on PPI medication.  he has been compliant with current medications have reviewed them. He is only taking altace 5mg daily  The patient denies medication side effects. Soft tissue mass   Small left leg 7 months old. No trauma,      Documented Vitals    11/06/17 0810   BP: 148/72   Pulse: 55   Resp: 16   Temp: 96.9 °F (36.1 °C)   SpO2: 98%   Weight: 179 lb (81.2 kg)   Height: 68\" (172.7 cm)       The following portions of the patient's history were reviewed and updated as appropriate: allergies, current medications, past family history, past medical history, past social history, past surgical history and problem list.    Review of Systems   Constitutional: Negative for activity change, appetite change and unexpected weight change.   Eyes: Negative for visual disturbance.   Respiratory: Negative for chest tightness and shortness of breath.    Cardiovascular: Negative for chest pain and palpitations.   Skin: Negative for color change.   Neurological: Negative for syncope and speech difficulty. "   Psychiatric/Behavioral: Negative for confusion and decreased concentration.       Objective   Physical Exam   Constitutional: He is oriented to person, place, and time. He appears well-developed and well-nourished.   HENT:   Mouth/Throat: Oropharynx is clear and moist.   Eyes: EOM are normal. Pupils are equal, round, and reactive to light.   Neck: Normal range of motion. Neck supple.   Cardiovascular: Normal rate and regular rhythm.    Pulmonary/Chest: Effort normal and breath sounds normal.   Abdominal: Soft. Bowel sounds are normal.   Musculoskeletal: Normal range of motion.   Soft tissue mass  Left leg  7 months old   Lymphadenopathy:     He has no cervical adenopathy.   Neurological: He is alert and oriented to person, place, and time.   Skin: Skin is warm and dry. No rash noted.   Psychiatric: He has a normal mood and affect. His behavior is normal.   Nursing note and vitals reviewed.    Views: lateral, posterior-anterior and oblique    Relevant Clinical Issues/Diagnoses/Indications for XRay: see HPI  Clinical Findings: Extremities: Tib-Fib: Normal    Compared with previous XRay? no    Date of Previous Xray:unknown date    Changes on current Xray? no  Assessment/Plan   Madhu was seen today for hyperlipidemia, hypertension, coronary artery disease and ddd.    Diagnoses and all orders for this visit:    Benign essential hypertension  -     clopidogrel (PLAVIX) 75 MG tablet; Take 1 tablet by mouth Daily.    Mixed hyperlipidemia  -     rosuvastatin (CRESTOR) 10 MG tablet; Take 1 tablet by mouth Daily.    Coronary artery disease due to lipid rich plaque  -     ramipril (ALTACE) 5 MG capsule; Take 1 capsule by mouth Daily.    Gastroesophageal reflux disease, esophagitis presence not specified  -     omeprazole (priLOSEC) 20 MG capsule; Take 1 capsule by mouth Daily.    DDD (degenerative disc disease), lumbosacral    Cerebrovascular accident (CVA), unspecified mechanism  -     clopidogrel (PLAVIX) 75 MG tablet;  Take 1 tablet by mouth Daily.    Mass of left lower leg  -     XR Tibia Fibula 2 View Left (In Office)    Other orders  -     Cancel: Hydrocodone-Acetaminophen (VICODIN) 5-300 MG per tablet; Take 1 tablet by mouth 2 (Two) Times a Day.

## 2017-11-10 ENCOUNTER — HOSPITAL ENCOUNTER (OUTPATIENT)
Dept: CARDIOLOGY | Facility: HOSPITAL | Age: 72
Discharge: HOME | End: 2017-11-10
Admitting: FAMILY MEDICINE

## 2017-11-10 ENCOUNTER — RESULTS ENCOUNTER (OUTPATIENT)
Dept: FAMILY MEDICINE CLINIC | Facility: CLINIC | Age: 72
End: 2017-11-10

## 2017-11-10 DIAGNOSIS — M51.37 DDD (DEGENERATIVE DISC DISEASE), LUMBOSACRAL: ICD-10-CM

## 2017-11-10 DIAGNOSIS — K21.9 GASTROESOPHAGEAL REFLUX DISEASE, ESOPHAGITIS PRESENCE NOT SPECIFIED: ICD-10-CM

## 2017-11-10 DIAGNOSIS — E78.2 MIXED HYPERLIPIDEMIA: ICD-10-CM

## 2017-11-10 DIAGNOSIS — I25.10 CORONARY ARTERY DISEASE DUE TO LIPID RICH PLAQUE: ICD-10-CM

## 2017-11-10 DIAGNOSIS — I25.83 CORONARY ARTERY DISEASE DUE TO LIPID RICH PLAQUE: ICD-10-CM

## 2017-11-10 DIAGNOSIS — R39.14 FEELING OF INCOMPLETE BLADDER EMPTYING: ICD-10-CM

## 2017-11-10 DIAGNOSIS — R22.42 MASS OF LEFT LOWER LEG: ICD-10-CM

## 2017-11-10 LAB
BH CV LOWER VASCULAR LEFT COMMON FEMORAL AUGMENT: NORMAL
BH CV LOWER VASCULAR LEFT COMMON FEMORAL COMPETENT: NORMAL
BH CV LOWER VASCULAR LEFT COMMON FEMORAL COMPRESS: NORMAL
BH CV LOWER VASCULAR LEFT COMMON FEMORAL PHASIC: NORMAL
BH CV LOWER VASCULAR LEFT COMMON FEMORAL SPONT: NORMAL
BH CV LOWER VASCULAR LEFT DISTAL FEMORAL COMPRESS: NORMAL
BH CV LOWER VASCULAR LEFT GASTRONEMIUS COMPRESS: NORMAL
BH CV LOWER VASCULAR LEFT GREATER SAPH AK COMPRESS: NORMAL
BH CV LOWER VASCULAR LEFT GREATER SAPH BK COMPRESS: NORMAL
BH CV LOWER VASCULAR LEFT MID FEMORAL AUGMENT: NORMAL
BH CV LOWER VASCULAR LEFT MID FEMORAL COMPETENT: NORMAL
BH CV LOWER VASCULAR LEFT MID FEMORAL COMPRESS: NORMAL
BH CV LOWER VASCULAR LEFT MID FEMORAL PHASIC: NORMAL
BH CV LOWER VASCULAR LEFT MID FEMORAL SPONT: NORMAL
BH CV LOWER VASCULAR LEFT PERONEAL COMPRESS: NORMAL
BH CV LOWER VASCULAR LEFT POPLITEAL AUGMENT: NORMAL
BH CV LOWER VASCULAR LEFT POPLITEAL COMPETENT: NORMAL
BH CV LOWER VASCULAR LEFT POPLITEAL COMPRESS: NORMAL
BH CV LOWER VASCULAR LEFT POPLITEAL PHASIC: NORMAL
BH CV LOWER VASCULAR LEFT POPLITEAL SPONT: NORMAL
BH CV LOWER VASCULAR LEFT POSTERIOR TIBIAL COMPRESS: NORMAL
BH CV LOWER VASCULAR LEFT PROXIMAL FEMORAL COMPRESS: NORMAL
BH CV LOWER VASCULAR LEFT SAPHENOFEMORAL JUNCTION AUGMENT: NORMAL
BH CV LOWER VASCULAR LEFT SAPHENOFEMORAL JUNCTION COMPETENT: NORMAL
BH CV LOWER VASCULAR LEFT SAPHENOFEMORAL JUNCTION COMPRESS: NORMAL
BH CV LOWER VASCULAR LEFT SAPHENOFEMORAL JUNCTION PHASIC: NORMAL
BH CV LOWER VASCULAR LEFT SAPHENOFEMORAL JUNCTION SPONT: NORMAL
BH CV LOWER VASCULAR RIGHT COMMON FEMORAL AUGMENT: NORMAL
BH CV LOWER VASCULAR RIGHT COMMON FEMORAL COMPETENT: NORMAL
BH CV LOWER VASCULAR RIGHT COMMON FEMORAL COMPRESS: NORMAL
BH CV LOWER VASCULAR RIGHT COMMON FEMORAL PHASIC: NORMAL
BH CV LOWER VASCULAR RIGHT COMMON FEMORAL SPONT: NORMAL

## 2017-11-10 PROCEDURE — 93971 EXTREMITY STUDY: CPT

## 2017-11-10 RX ORDER — ROSUVASTATIN CALCIUM 10 MG/1
TABLET, COATED ORAL
Qty: 15 TABLET | Refills: 0 | OUTPATIENT
Start: 2017-11-10

## 2017-11-21 ENCOUNTER — OFFICE VISIT (OUTPATIENT)
Dept: CARDIOLOGY | Facility: CLINIC | Age: 72
End: 2017-11-21

## 2017-11-21 VITALS
DIASTOLIC BLOOD PRESSURE: 72 MMHG | HEART RATE: 58 BPM | WEIGHT: 179 LBS | BODY MASS INDEX: 27.13 KG/M2 | HEIGHT: 68 IN | SYSTOLIC BLOOD PRESSURE: 130 MMHG | OXYGEN SATURATION: 95 %

## 2017-11-21 DIAGNOSIS — I25.810 CORONARY ARTERY DISEASE INVOLVING CORONARY BYPASS GRAFT OF NATIVE HEART WITHOUT ANGINA PECTORIS: ICD-10-CM

## 2017-11-21 DIAGNOSIS — I77.9 BILATERAL CAROTID ARTERY DISEASE (HCC): ICD-10-CM

## 2017-11-21 DIAGNOSIS — Z98.890 STATUS POST CATHETER ABLATION OF ATRIAL FLUTTER: Primary | ICD-10-CM

## 2017-11-21 PROCEDURE — 99213 OFFICE O/P EST LOW 20 MIN: CPT | Performed by: INTERNAL MEDICINE

## 2017-11-21 RX ORDER — NITROGLYCERIN 0.4 MG/1
0.4 TABLET SUBLINGUAL
Qty: 20 TABLET | Refills: 2 | Status: SHIPPED | OUTPATIENT
Start: 2017-11-21 | End: 2018-05-23 | Stop reason: SDUPTHER

## 2017-11-22 ENCOUNTER — OFFICE VISIT (OUTPATIENT)
Dept: FAMILY MEDICINE CLINIC | Facility: CLINIC | Age: 72
End: 2017-11-22

## 2017-11-22 VITALS
OXYGEN SATURATION: 98 % | RESPIRATION RATE: 16 BRPM | SYSTOLIC BLOOD PRESSURE: 140 MMHG | TEMPERATURE: 97.5 F | HEIGHT: 68 IN | BODY MASS INDEX: 27.13 KG/M2 | HEART RATE: 55 BPM | WEIGHT: 179 LBS | DIASTOLIC BLOOD PRESSURE: 76 MMHG

## 2017-11-22 DIAGNOSIS — M51.37 DDD (DEGENERATIVE DISC DISEASE), LUMBOSACRAL: ICD-10-CM

## 2017-11-22 DIAGNOSIS — M79.89 SOFT TISSUE MASS: Primary | ICD-10-CM

## 2017-11-22 PROCEDURE — 99214 OFFICE O/P EST MOD 30 MIN: CPT | Performed by: FAMILY MEDICINE

## 2017-11-22 RX ORDER — HYDROCODONE BITARTRATE AND ACETAMINOPHEN 5; 300 MG/1; MG/1
1 TABLET ORAL 2 TIMES DAILY
Qty: 60 TABLET | Refills: 0 | Status: SHIPPED | OUTPATIENT
Start: 2017-11-22 | End: 2017-12-18 | Stop reason: SDUPTHER

## 2017-11-22 NOTE — PROGRESS NOTES
Subjective   Madhu Santoro is a 71 y.o. male.     History of Present Illness   Chief Complaint:   Chief Complaint   Patient presents with   • Soft tissue mass     Doppler results   • DDD       Mahdu Santoro 71 y.o. male who presents today to follow up for a soft tissue mass of his left leg that has been present for 7 months and medical management of below issues with medication refills. The doppler was a normal left lower extremity venous duplex scan. The patient has read and signed the [facility] Controlled Substance Contract.  I will continue to see patient for regular follow up appointments.  They are well controlled on their medication.  RUTH has been reviewed by me and is updated every 3 months. The patient is aware of the potential for addiction and dependence. bp at home good and I got 140/76  And at home you got 116/68. Nl xray and normal doppler exam     he has a problem list of   Patient Active Problem List   Diagnosis   • Anxiety   • Arthritis   • Chronic coronary artery disease   • HLD (hyperlipidemia)   • Benign essential hypertension   • DDD (degenerative disc disease), lumbosacral   • Cerebrovascular accident   • Encounter for screening for cardiovascular disorders   • Gastroesophageal reflux disease   • Hyperlipidemia   • Stenosis of carotid artery   • Former smoker   • History of cardiac catheterization   • S/P ablation of atrial flutter   • Typical atrial flutter   • S/P CABG (coronary artery bypass graft)   .  Since the last visit, he has overall felt well.  he has been compliant with   Current Outpatient Prescriptions:   •  clopidogrel (PLAVIX) 75 MG tablet, Take 1 tablet by mouth Daily., Disp: 30 tablet, Rfl: 5  •  Hydrocodone-Acetaminophen (VICODIN) 5-300 MG per tablet, Take 1 tablet by mouth 2 (Two) Times a Day., Disp: 60 tablet, Rfl: 0  •  nitroglycerin (NITROSTAT) 0.4 MG SL tablet, Place 1 tablet under the tongue Every 5 (Five) Minutes As Needed for Chest Pain. Take no more than 3  "doses in 15 minutes., Disp: 20 tablet, Rfl: 2  •  omeprazole (priLOSEC) 20 MG capsule, Take 1 capsule by mouth Daily., Disp: 30 capsule, Rfl: 5  •  ramipril (ALTACE) 5 MG capsule, Take 1 capsule by mouth Daily., Disp: 30 capsule, Rfl: 5  •  rosuvastatin (CRESTOR) 10 MG tablet, Take 1 tablet by mouth Daily., Disp: 30 tablet, Rfl: 5.  he denies medication side effects.    All of the chronic condition(s) listed above are stable w/o issues.    /90  Pulse 55  Temp 97.5 °F (36.4 °C) (Oral)   Resp 16  Ht 68\" (172.7 cm)  Wt 179 lb (81.2 kg)  SpO2 98%  BMI 27.22 kg/m2    Results for orders placed or performed during the hospital encounter of 11/10/17   Duplex Venous Lower Extremity - Left CAR   Result Value Ref Range    Right Common Femoral Spont Y     Right Common Femoral Phasic Y     Right Common Femoral Augment Y     Right Common Femoral Competent Y     Right Common Femoral Compress C     Left Common Femoral Spont Y     Left Common Femoral Phasic Y     Left Common Femoral Augment Y     Left Common Femoral Competent Y     Left Common Femoral Compress C     Left Saphenofemoral Junction Spont Y     Left Saphenofemoral Junction Phasic Y     Left Saphenofemoral Junction Augment Y     Left Saphenofemoral Junction Competent Y     Left Saphenofemoral Junction Compress C     Left Proximal Femoral Compress C     Left Mid Femoral Spont Y     Left Mid Femoral Phasic Y     Left Mid Femoral Augment Y     Left Mid Femoral Competent Y     Left Mid Femoral Compress C     Left Distal Femoral Compress C     Left Popliteal Spont Y     Left Popliteal Phasic Y     Left Popliteal Augment Y     Left Popliteal Competent Y     Left Popliteal Compress C     Left Posterior Tibial Compress C     Left Peroneal Compress C     Left GastronemiusSoleal Compress C     Left Greater Saph AK Compress C     Left Greater Saph BK Compress C            The following portions of the patient's history were reviewed and updated as appropriate: " allergies, current medications, past family history, past medical history, past social history, past surgical history and problem list.    Review of Systems   Constitutional: Negative for activity change, appetite change and unexpected weight change.   Eyes: Negative for visual disturbance.   Respiratory: Negative for chest tightness and shortness of breath.    Cardiovascular: Negative for chest pain and palpitations.   Skin: Negative for color change.   Neurological: Negative for syncope and speech difficulty.   Psychiatric/Behavioral: Negative for confusion and decreased concentration.       Objective   Physical Exam   Constitutional: He is oriented to person, place, and time. He appears well-developed and well-nourished.   Eyes: Pupils are equal, round, and reactive to light.   Neck: Normal range of motion.   Cardiovascular: Normal rate and regular rhythm.    Pulmonary/Chest: Effort normal and breath sounds normal.   Abdominal: Soft. Bowel sounds are normal.   Musculoskeletal: Normal range of motion. He exhibits no edema, tenderness or deformity.   Neurological: He is alert and oriented to person, place, and time.   Skin: Skin is warm and dry. No rash noted. No erythema.   Psychiatric: He has a normal mood and affect. His behavior is normal.   Nursing note reviewed.      Assessment/Plan   Madhu was seen today for soft tissue mass and ddd.    Diagnoses and all orders for this visit:    Soft tissue mass  Comments:  left leg    DDD (degenerative disc disease), lumbosacral  -     Hydrocodone-Acetaminophen (VICODIN) 5-300 MG per tablet; Take 1 tablet by mouth 2 (Two) Times a Day.

## 2017-11-25 NOTE — PROGRESS NOTES
Date of Office Visit: 2017  Encounter Provider: Eric Wright MD  Place of Service: [facility] CARDIOLOGY  Patient Name: Madhu Santoro  :1945    Chief complaint: Follow-up for coronary artery disease, atrial flutter status   post ablation, and carotid artery disease.    History of Present Illness:    Dear Dr. Chen:      I again had the pleasure of seeing your patient in cardiology office on 2017.  As you   well know, he is a very pleasant 71 year-old white male with a past medical history   significant for coronary artery disease, prior carotid endarterectomy, and atrial flutter   status post ablation who presents for follow-up.  The patient formerly followed with Dr. Roger from the [facility].  He has a history of a carotid endarterectomy   in the past, and also has a history of a stroke for which he took Plavix for many years.      He presented to the [facility] emergency department on 2017 after   his primary care physician noted that he had a rapid heart rate of 150 bpm.  He was   found to be in typical atrial flutter at the time, which was a new diagnosis for him, although   he was completely asymptomatic with regards to the atrial flutter and was unaware that   he was in this.  He also had reported that he had experienced some chest pressure   radiating into his throat earlier in the week.  He was converted to sinus rhythm with an   amiodarone drip, and an echocardiogram on 2017 showed an ejection fraction of   55-60%, and no significant valvular disease.  He ultimately underwent a diagnostic left   heart catheterization on 4/10/2017 by Dr. Healy for presumed unstable angina and a   known history of coronary artery disease.  This showed severe coronary artery disease,   including a distal 80% severely calcified lesion in the left main extending into the ostium   of the left circumflex coronary artery.  He was  then referred for a coronary artery bypass   grafting operation, and underwent this by Dr. Cortez on 4/11/2017.  At that time, he had   a LIMA to LAD, SVG to OM1, and SVG to OM 3.  He did well postoperatively, but continued   to have significant issues with rapid atrial flutter which was largely unresponsive to   amiodarone at the time.  He ultimately underwent a cavotricuspid isthmus atrial flutter   ablation by Dr. Gustafson on 4/18/2017.  A preoperative AR performed the day prior to   this did not show any evidence for thrombus formation.  He did develop bradycardia on   even minimal doses of metoprolol as an outpatient, and this had to be discontinued.      The patient presents today for follow-up.  His only complaint recently is that he feels as if   he needs to take a deep breath at times.  Mostly, this is not with exertion, and this occurs   at random times.  He does not describe this as true shortness of breath.  He did recently   have an echocardiogram on 10/24/2017 which showed an ejection fraction of 56% and   grade 2 diastolic dysfunction.  He has not had any chest or throat pain.  He states that   his blood pressure has been well-controlled at home.    Past Medical History:   Diagnosis Date   • Anxiety    • Arthritis    • Atrial flutter     Status post cavotricuspid isthmus ablation by Dr. Gustafson on 4/18/17   • Benign essential hypertension    • CAD (coronary artery disease)     3 vessel CABG 4/11/17 by Dr. Cortez: ROSARIO-prox LAD, SVG-OM1, SVG-OM3   • Carotid artery disease     Status post carotid endarterectomy - USG 4/10/17: 50-59% NICHELLE, 1-15% LICA.    • Colonic polyp    • DDD (degenerative disc disease), lumbosacral    • H/O bone density study 2013   • H/O complete eye exam 2014   • HLD (hyperlipidemia)    • Hypertension    • Lipid screening 05/31/2013   • Low back pain     [facility]   • Screening for prostate cancer 07/07/2015   • Stroke        Past Surgical History:    Procedure Laterality Date   • CARDIAC CATHETERIZATION N/A 4/10/2017    Procedure: Left Heart Cath;  Surgeon: Marjorie Healy MD;  Location: [facility] CATH INVASIVE LOCATION;  Service:    • CARDIAC CATHETERIZATION N/A 4/10/2017    Procedure: Coronary angiography;  Surgeon: Marjorie Healy MD;  [facility];  Service:    • CARDIAC CATHETERIZATION N/A 4/10/2017    Procedure: Left ventriculography;  Surgeon: Marjorie Healy MD;  Location: [facility] CATH INVASIVE LOCATION;  Service:    • CARDIAC ELECTROPHYSIOLOGY PROCEDURE N/A 4/18/2017    Procedure: Ablation atrial flutter;  Surgeon: Jose Antonio Gustafson MD;  Location: [facility] CATH INVASIVE LOCATION;  Service:    • COLONOSCOPY  2010   • CORONARY ARTERY BYPASS GRAFT N/A 4/11/2017    Procedure: AR STERNOTOMY CORONARY ARTERY BYPASS GRAFT TIMES 3 USING LEFT INTERNAL MAMMARY ARTERY AND LEFT GREATER SAPHENOUS VEIN GRAFT PER ENDOSCOPIC VEIN HARVESTING AND PRP ;  Surgeon: Temo Cortez MD;  Location: [facility];  Service:        Current Outpatient Prescriptions on File Prior to Visit   Medication Sig Dispense Refill   • clopidogrel (PLAVIX) 75 MG tablet Take 1 tablet by mouth Daily. 30 tablet 5   • omeprazole (priLOSEC) 20 MG capsule Take 1 capsule by mouth Daily. 30 capsule 5   • ramipril (ALTACE) 5 MG capsule Take 1 capsule by mouth Daily. 30 capsule 5   • rosuvastatin (CRESTOR) 10 MG tablet Take 1 tablet by mouth Daily. 30 tablet 5     No current facility-administered medications on file prior to visit.      Allergies as of 11/21/2017 - Chris as Reviewed 11/21/2017   Allergen Reaction Noted   • Penicillins  10/05/2016     Social History     Social History   • Marital status:      Spouse name: N/A   • Number of children: N/A   • Years of education: N/A     Occupational History   • Not on file.     Social History Main Topics   • Smoking status: Former Smoker   • Smokeless tobacco: Never Used   • Alcohol use No   • Drug use: No   • Sexual  "activity: Yes     Partners: Female     Other Topics Concern   • Not on file     Social History Narrative     Family History   Problem Relation Age of Onset   • Heart disease Mother    • Heart attack Mother    • Stroke Mother    • Heart disease Father    • Heart attack Father    • Stroke Father    • Arthritis Other    • Diabetes Other    • Hypertension Other    • Kidney disease Other      stones   • Hypertension Sister    • Heart attack Brother    • Heart disease Brother    • No Known Problems Maternal Grandmother    • No Known Problems Maternal Grandfather    • No Known Problems Paternal Grandmother    • No Known Problems Paternal Grandfather    • No Known Problems Brother    • Heart disease Brother    • Heart attack Brother    • Diabetes Brother        Review of Systems   Respiratory: Positive for shortness of breath.    Musculoskeletal: Positive for joint pain, joint swelling and myalgias.   Genitourinary: Positive for frequency.   All other systems reviewed and are negative.    Objective:     Vitals:    11/21/17 1423   BP: 130/72   Pulse: 58   SpO2: 95%   Weight: 179 lb (81.2 kg)   Height: 68\" (172.7 cm)     Body mass index is 27.22 kg/(m^2).    Physical Exam   Constitutional: He is oriented to person, place, and time. He appears well-developed and well-nourished.   HENT:   Head: Normocephalic and atraumatic.   Eyes: Conjunctivae are normal.   Neck: Neck supple.   Cardiovascular: Normal rate and regular rhythm.  Exam reveals no gallop and no friction rub.    No murmur heard.  Pulmonary/Chest: Effort normal and breath sounds normal.   Abdominal: Soft. There is no tenderness.   Musculoskeletal: He exhibits no edema.   Neurological: He is alert and oriented to person, place, and time.   Skin: Skin is warm.   Psychiatric: He has a normal mood and affect. His behavior is normal.     Lab Review:   Procedures    Cardiac Procedures:  1.  Echocardiogram on 4/8/2017: The ejection fraction was 55-60%.  Left atrium was "   mildly to moderately dilated.  There was mild mitral regurgitation.  2.  Left heart catheterization on 4/10/2017: There was a distal 80% and severely   calcified stenosis in the left main extending into the ostium of left circumflex.  The left   circumflex was dominant, and did have the before mentioned 80% lesion in the ostial   aspect extending from the left main.  The LAD had 20% proximal disease.  The right   coronary artery was small, nondominant, and normal.  3.  Status post 3 vessel coronary artery bypass grafting on 4/11/2017 by Dr. Cortez:   LIMA to proximal LAD, SVG to OM 1, and SVG to OM 3.  4.  Transesophageal echo on 4/17/2017: The ejection fraction was 55%.  There was   moderate left atrial enlargement and mild to moderate right atrial enlargement.  The   atrial septum was redundant, but no PFO was seen on the saline study.  There was   no significant valvular disease.  5.  Status post cavotricuspid isthmus ablation for typical atrial flutter on 4/18/2017 by Dr. Gustafson.  6.  Carotid Doppler ultrasound on 4/10/2017: There was 50-59% stenosis in the right   internal carotid artery.  There was 1-15% disease in the left internal carotid artery.  7.  Echocardiogram on 10/24/2017: Ejection fraction was 56%.  There was grade 2   diastolic dysfunction.  There was aortic sclerosis without stenosis.       Assessment:       Diagnosis Plan   1. Status post catheter ablation of atrial flutter     2. Coronary artery disease involving coronary bypass graft of native heart without angina pectoris  nitroglycerin (NITROSTAT) 0.4 MG SL tablet   3. Bilateral carotid artery disease       Plan:       As noted, the patient's only complaint is that he occasionally has to take a deep breath.    Again, he does not describe this as true shortness of breath, and he is able to complete   activities on his own.  His recent echocardiogram was relatively unremarkable other   than grade 2 diastolic dysfunction.  I do not feel  that he has diastolic heart failure, and   he appears to be euvolemic.  He was having significant bruising with both the aspirin   and Plavix, and I took him off of aspirin at his last visit.  He takes the Plavix not only for   his history of coronary disease, but also for his history of stroke and current carotid   artery disease.  He is still getting bruising, although it is improved since the aspirin   was discontinued.  His blood pressure has been relatively well-controlled at home,   mainly in the 120 to 130 systolic range.  He is going to continue on the ramipril at 5   mg per day, although this could be increased to 5 mg bid if needed.  He will also   continue on the Crestor at 10 mg per day.  He will need another carotid artery Doppler  ultrasound in approximately April 2018 (one year from the previous).  I did call in   sublingual nitroglycerin as his prescription is old.  I will see him back in the office   within 4 months.    Coronary Artery Disease  Assessment  • The patient has no angina   Plan  • Lifestyle modifications discussed include adhering to a heart healthy diet, avoidance of tobacco products, maintenance of a healthy weight, medication compliance, regular exercise and regular monitoring of cholesterol and blood pressure   Subjective - Objective  • There is a history of previous coronary artery bypass graft on or around 4/11/2017  • Current antiplatelet therapy includes aspirin 81 mg

## 2017-12-18 DIAGNOSIS — M51.37 DDD (DEGENERATIVE DISC DISEASE), LUMBOSACRAL: ICD-10-CM

## 2017-12-18 RX ORDER — HYDROCODONE BITARTRATE AND ACETAMINOPHEN 5; 300 MG/1; MG/1
1 TABLET ORAL 2 TIMES DAILY
Qty: 60 TABLET | Refills: 0 | Status: SHIPPED | OUTPATIENT
Start: 2017-12-18 | End: 2018-01-19 | Stop reason: SDUPTHER

## 2018-01-19 DIAGNOSIS — M51.37 DDD (DEGENERATIVE DISC DISEASE), LUMBOSACRAL: ICD-10-CM

## 2018-01-19 RX ORDER — HYDROCODONE BITARTRATE AND ACETAMINOPHEN 5; 300 MG/1; MG/1
1 TABLET ORAL 2 TIMES DAILY
Qty: 60 TABLET | Refills: 0 | Status: SHIPPED | OUTPATIENT
Start: 2018-01-19 | End: 2018-02-19 | Stop reason: SDUPTHER

## 2018-02-19 ENCOUNTER — OFFICE VISIT (OUTPATIENT)
Dept: FAMILY MEDICINE CLINIC | Facility: CLINIC | Age: 73
End: 2018-02-19

## 2018-02-19 VITALS
WEIGHT: 184 LBS | DIASTOLIC BLOOD PRESSURE: 72 MMHG | BODY MASS INDEX: 27.89 KG/M2 | RESPIRATION RATE: 16 BRPM | TEMPERATURE: 97.5 F | HEART RATE: 66 BPM | SYSTOLIC BLOOD PRESSURE: 136 MMHG | HEIGHT: 68 IN | OXYGEN SATURATION: 97 %

## 2018-02-19 DIAGNOSIS — I63.9 CEREBROVASCULAR ACCIDENT (CVA), UNSPECIFIED MECHANISM (HCC): ICD-10-CM

## 2018-02-19 DIAGNOSIS — M51.37 DDD (DEGENERATIVE DISC DISEASE), LUMBOSACRAL: ICD-10-CM

## 2018-02-19 DIAGNOSIS — E78.5 HYPERLIPIDEMIA, UNSPECIFIED HYPERLIPIDEMIA TYPE: ICD-10-CM

## 2018-02-19 DIAGNOSIS — I25.10 CHRONIC CORONARY ARTERY DISEASE: Primary | ICD-10-CM

## 2018-02-19 PROCEDURE — 99213 OFFICE O/P EST LOW 20 MIN: CPT | Performed by: FAMILY MEDICINE

## 2018-02-19 RX ORDER — HYDROCODONE BITARTRATE AND ACETAMINOPHEN 5; 300 MG/1; MG/1
1 TABLET ORAL 2 TIMES DAILY
Qty: 60 TABLET | Refills: 0 | Status: SHIPPED | OUTPATIENT
Start: 2018-02-19 | End: 2018-03-21 | Stop reason: SDUPTHER

## 2018-02-19 NOTE — PATIENT INSTRUCTIONS
Patients must be seen every 3 months for their presription medication review and refill required by [place] law.  Patient has been advised on the potential for addiction  and dependence to their prescribed scheduled medication.  RUTH was obtained today and reviewed. This was scanned into the patient's chart.

## 2018-02-19 NOTE — PROGRESS NOTES
Subjective   Madhu Santoro is a 72 y.o. male.     History of Present Illness   Chief Complaint:   Chief Complaint   Patient presents with   • DDD lumbosacral       Madhu Santoro 72 y.o. male who presents today for Medical Management of the below listed issues and medication refills. The patient has read and signed the [facility] Controlled Substance Contract.  I will continue to see patient for regular follow up appointments.  They are well controlled on their medication.  RUTH has been reviewed by me and is updated every 3 months. The patient is aware of the potential for addiction and dependence. He has stayed at  vicodin 5mg bid prn pain  Pain constant low back. Will not increase vicodin.  Walking program at home.    he has a problem list of   Patient Active Problem List   Diagnosis   • Anxiety   • Arthritis   • Chronic coronary artery disease   • HLD (hyperlipidemia)   • Benign essential hypertension   • DDD (degenerative disc disease), lumbosacral   • Cerebrovascular accident   • Encounter for screening for cardiovascular disorders   • Gastroesophageal reflux disease   • Hyperlipidemia   • Stenosis of carotid artery   • Former smoker   • History of cardiac catheterization   • S/P ablation of atrial flutter   • Typical atrial flutter   • S/P CABG (coronary artery bypass graft)   .  Since the last visit, he has overall felt well.  he has been compliant with   Current Outpatient Prescriptions:   •  clopidogrel (PLAVIX) 75 MG tablet, Take 1 tablet by mouth Daily., Disp: 30 tablet, Rfl: 5  •  Hydrocodone-Acetaminophen (VICODIN) 5-300 MG per tablet, Take 1 tablet by mouth 2 (Two) Times a Day., Disp: 60 tablet, Rfl: 0  •  nitroglycerin (NITROSTAT) 0.4 MG SL tablet, Place 1 tablet under the tongue Every 5 (Five) Minutes As Needed for Chest Pain. Take no more than 3 doses in 15 minutes., Disp: 20 tablet, Rfl: 2  •  omeprazole (priLOSEC) 20 MG capsule, Take 1 capsule by mouth Daily., Disp: 30 capsule, Rfl: 5  •  " ramipril (ALTACE) 5 MG capsule, Take 1 capsule by mouth Daily., Disp: 30 capsule, Rfl: 5  •  rosuvastatin (CRESTOR) 10 MG tablet, Take 1 tablet by mouth Daily., Disp: 30 tablet, Rfl: 5.  he denies medication side effects.    All of the chronic condition(s) listed above are stable w/o issues.    /72  Pulse 66  Temp 97.5 °F (36.4 °C) (Oral)   Resp 16  Ht 172.7 cm (68\")  Wt 83.5 kg (184 lb)  SpO2 97%  BMI 27.98 kg/m2    The following portions of the patient's history were reviewed and updated as appropriate: allergies, current medications, past family history, past medical history, past social history, past surgical history and problem list.    Review of Systems   Constitutional: Negative for activity change, appetite change, chills, fatigue, fever and unexpected weight change.   HENT: Negative for congestion, ear pain, hearing loss, mouth sores, nosebleeds, rhinorrhea and sore throat.    Eyes: Negative for pain and visual disturbance.   Respiratory: Negative for cough, shortness of breath and wheezing.    Cardiovascular: Negative for chest pain, palpitations and leg swelling.   Gastrointestinal: Negative for abdominal distention, abdominal pain, blood in stool, constipation, diarrhea, nausea and vomiting.   Endocrine: Negative for cold intolerance and heat intolerance.   Genitourinary: Negative for difficulty urinating, discharge, dysuria, frequency, hematuria and urgency.   Musculoskeletal: Negative for back pain and joint swelling.   Skin: Negative for rash and wound.   Neurological: Negative for dizziness, weakness, numbness and headaches.   Hematological: Does not bruise/bleed easily.   Psychiatric/Behavioral: Negative for confusion, dysphoric mood, sleep disturbance and suicidal ideas. The patient is not nervous/anxious.        Objective   Physical Exam   Constitutional: He is oriented to person, place, and time. He appears well-developed and well-nourished.   HENT:   Head: Normocephalic. "   Mouth/Throat: Oropharynx is clear and moist.   Eyes: Pupils are equal, round, and reactive to light.   Cardiovascular: Normal rate, regular rhythm and normal heart sounds.    Pulmonary/Chest: Effort normal and breath sounds normal.   Abdominal: Soft.   Musculoskeletal: Normal range of motion.   Chronic low back pain   Neurological: He is alert and oriented to person, place, and time.   Psychiatric: He has a normal mood and affect. His behavior is normal.   Nursing note reviewed.      Assessment/Plan   Madhu was seen today for ddd lumbosacral.    Diagnoses and all orders for this visit:    Chronic coronary artery disease  -     Comprehensive metabolic panel; Future  -     Lipid panel; Future  -     CBC and Differential; Future  -     TSH; Future    DDD (degenerative disc disease), lumbosacral  -     Hydrocodone-Acetaminophen (VICODIN) 5-300 MG per tablet; Take 1 tablet by mouth 2 (Two) Times a Day.  -     Comprehensive metabolic panel; Future  -     Lipid panel; Future  -     CBC and Differential; Future  -     TSH; Future    Cerebrovascular accident (CVA), unspecified mechanism  -     Comprehensive metabolic panel; Future  -     Lipid panel; Future  -     CBC and Differential; Future  -     TSH; Future    Hyperlipidemia, unspecified hyperlipidemia type  -     Comprehensive metabolic panel; Future  -     Lipid panel; Future  -     CBC and Differential; Future  -     TSH; Future

## 2018-02-23 ENCOUNTER — OFFICE VISIT (OUTPATIENT)
Dept: FAMILY MEDICINE CLINIC | Facility: CLINIC | Age: 73
End: 2018-02-23

## 2018-02-23 VITALS
SYSTOLIC BLOOD PRESSURE: 116 MMHG | WEIGHT: 187 LBS | HEIGHT: 68 IN | RESPIRATION RATE: 16 BRPM | TEMPERATURE: 96.9 F | DIASTOLIC BLOOD PRESSURE: 64 MMHG | HEART RATE: 84 BPM | BODY MASS INDEX: 28.34 KG/M2 | OXYGEN SATURATION: 97 %

## 2018-02-23 DIAGNOSIS — I10 BENIGN ESSENTIAL HYPERTENSION: ICD-10-CM

## 2018-02-23 DIAGNOSIS — K21.9 GASTROESOPHAGEAL REFLUX DISEASE, ESOPHAGITIS PRESENCE NOT SPECIFIED: Primary | ICD-10-CM

## 2018-02-23 DIAGNOSIS — I25.10 CHRONIC CORONARY ARTERY DISEASE: ICD-10-CM

## 2018-02-23 PROCEDURE — 99213 OFFICE O/P EST LOW 20 MIN: CPT | Performed by: FAMILY MEDICINE

## 2018-02-23 RX ORDER — RANITIDINE 150 MG/1
150 CAPSULE ORAL DAILY PRN
Qty: 90 CAPSULE | Refills: 1 | Status: SHIPPED | OUTPATIENT
Start: 2018-02-23 | End: 2018-05-23 | Stop reason: SDUPTHER

## 2018-02-23 NOTE — PROGRESS NOTES
Subjective   Madhu Santoro is a 72 y.o. male.     History of Present Illness   Chief Complaint:   Chief Complaint   Patient presents with   • Hypertension   • Hyperlipidemia   • Coronary Artery Disease       Madhu Santoro 72 y.o. male who presents today to discuss potential clinical concerns of taking clopidogrel and omeprazole together. See note from pharmacy and they say use  H2 blocker like zantac and stop prilosec. He takes prilosec daily and only has occ gastritis, will substitute  Zantac 150 mg daily prn and stop omeprazole.  he has a problem list of    Call if any problems  Patient Active Problem List   Diagnosis   • Anxiety   • Arthritis   • Chronic coronary artery disease   • HLD (hyperlipidemia)   • Benign essential hypertension   • DDD (degenerative disc disease), lumbosacral   • Cerebrovascular accident   • Encounter for screening for cardiovascular disorders   • Gastroesophageal reflux disease   • Hyperlipidemia   • Stenosis of carotid artery   • Former smoker   • History of cardiac catheterization   • S/P ablation of atrial flutter   • Typical atrial flutter   • S/P CABG (coronary artery bypass graft)   .  Since the last visit, he has overall felt well.  he has been compliant with   Current Outpatient Prescriptions:   •  clopidogrel (PLAVIX) 75 MG tablet, Take 1 tablet by mouth Daily., Disp: 30 tablet, Rfl: 5  •  Hydrocodone-Acetaminophen (VICODIN) 5-300 MG per tablet, Take 1 tablet by mouth 2 (Two) Times a Day., Disp: 60 tablet, Rfl: 0  •  nitroglycerin (NITROSTAT) 0.4 MG SL tablet, Place 1 tablet under the tongue Every 5 (Five) Minutes As Needed for Chest Pain. Take no more than 3 doses in 15 minutes., Disp: 20 tablet, Rfl: 2  •  omeprazole (priLOSEC) 20 MG capsule, Take 1 capsule by mouth Daily., Disp: 30 capsule, Rfl: 5  •  ramipril (ALTACE) 5 MG capsule, Take 1 capsule by mouth Daily., Disp: 30 capsule, Rfl: 5  •  rosuvastatin (CRESTOR) 10 MG tablet, Take 1 tablet by mouth Daily., Disp: 30  "tablet, Rfl: 5.  he denies medication side effects.    All of the chronic condition(s) listed above are stable w/o issues.    /64  Pulse 84  Temp 96.9 °F (36.1 °C) (Oral)   Resp 16  Ht 172.7 cm (68\")  Wt 84.8 kg (187 lb)  SpO2 97%  BMI 28.43 kg/m2    The following portions of the patient's history were reviewed and updated as appropriate: allergies, current medications, past family history, past medical history, past social history, past surgical history and problem list.    Review of Systems   Constitutional: Negative for activity change, appetite change, chills, fatigue, fever and unexpected weight change.   HENT: Negative for congestion, ear pain, hearing loss, mouth sores, nosebleeds, rhinorrhea and sore throat.    Eyes: Negative for pain and visual disturbance.   Respiratory: Negative for cough, shortness of breath and wheezing.    Cardiovascular: Negative for chest pain, palpitations and leg swelling.   Gastrointestinal: Negative for abdominal distention, abdominal pain, blood in stool, constipation, diarrhea, nausea and vomiting.   Endocrine: Negative for cold intolerance and heat intolerance.   Genitourinary: Negative for difficulty urinating, discharge, dysuria, frequency, hematuria and urgency.   Musculoskeletal: Negative for back pain and joint swelling.   Skin: Negative for rash and wound.   Neurological: Negative for dizziness, weakness, numbness and headaches.   Hematological: Does not bruise/bleed easily.   Psychiatric/Behavioral: Negative for confusion, dysphoric mood, sleep disturbance and suicidal ideas. The patient is not nervous/anxious.        Objective   Physical Exam   Cardiovascular: Normal rate and regular rhythm.    Pulmonary/Chest: Effort normal and breath sounds normal.   Abdominal: Soft. Bowel sounds are normal. There is no tenderness.       Assessment/Plan   Madhu was seen today for hypertension, coronary artery disease and heartburn.    Diagnoses and all orders for this " visit:    Gastroesophageal reflux disease, esophagitis presence not specified    Chronic coronary artery disease    Benign essential hypertension    Other orders  -     ranitidine (ZANTAC) 150 MG capsule; Take 1 capsule by mouth Daily As Needed for Indigestion or Heartburn.

## 2018-03-07 ENCOUNTER — OFFICE VISIT (OUTPATIENT)
Dept: CARDIOLOGY | Facility: CLINIC | Age: 73
End: 2018-03-07

## 2018-03-07 VITALS
HEART RATE: 68 BPM | BODY MASS INDEX: 27.89 KG/M2 | OXYGEN SATURATION: 98 % | WEIGHT: 184 LBS | HEIGHT: 68 IN | DIASTOLIC BLOOD PRESSURE: 64 MMHG | SYSTOLIC BLOOD PRESSURE: 110 MMHG

## 2018-03-07 DIAGNOSIS — I77.9 BILATERAL CAROTID ARTERY DISEASE (HCC): ICD-10-CM

## 2018-03-07 DIAGNOSIS — Z98.890 STATUS POST CATHETER ABLATION OF ATRIAL FLUTTER: ICD-10-CM

## 2018-03-07 DIAGNOSIS — I25.810 CORONARY ARTERY DISEASE INVOLVING CORONARY BYPASS GRAFT OF NATIVE HEART WITHOUT ANGINA PECTORIS: Primary | ICD-10-CM

## 2018-03-07 PROCEDURE — 99213 OFFICE O/P EST LOW 20 MIN: CPT | Performed by: INTERNAL MEDICINE

## 2018-03-21 DIAGNOSIS — M51.37 DDD (DEGENERATIVE DISC DISEASE), LUMBOSACRAL: ICD-10-CM

## 2018-03-21 RX ORDER — HYDROCODONE BITARTRATE AND ACETAMINOPHEN 5; 300 MG/1; MG/1
1 TABLET ORAL 2 TIMES DAILY
Qty: 60 TABLET | Refills: 0 | Status: SHIPPED | OUTPATIENT
Start: 2018-03-21 | End: 2018-04-20 | Stop reason: SDUPTHER

## 2018-03-21 NOTE — PROGRESS NOTES
Date of Office Visit: 2018  Encounter Provider: Eric Wright MD  Place of Service: [facility] CARDIOLOGY  Patient Name: Madhu Santoro  :1945    Chief complaint: Follow-up for coronary artery disease, atrial flutter status   post ablation, and carotid artery disease.    History of Present Illness:    Dear Dr. Chen:      I again had the pleasure of seeing your patient in cardiology office on 2017.  As you   well know, he is a very pleasant 72 year-old white male with a past medical history   significant for coronary artery disease, prior carotid endarterectomy, and atrial flutter   status post ablation who presents for follow-up.  The patient formerly followed with Dr. Roger from the [facility].  He has a history of a carotid endarterectomy   in the past, and also has a history of a stroke for which he took Plavix for many years.       He presented to the [facility] emergency department on 2017 after   his primary care physician noted that he had a rapid heart rate of 150 bpm.  He was   found to be in typical atrial flutter at the time, which was a new diagnosis for him, although   he was completely asymptomatic with regards to the atrial flutter and was unaware that   he was in this.  He also had reported that he had experienced some chest pressure   radiating into his throat earlier in the week.  He was converted to sinus rhythm with an   amiodarone drip, and an echocardiogram on 2017 showed an ejection fraction of   55-60%, and no significant valvular disease.  He ultimately underwent a diagnostic left   heart catheterization on 4/10/2017 by Dr. Healy for presumed unstable angina and a   known history of coronary artery disease.  This showed severe coronary artery disease,   including a distal 80% severely calcified lesion in the left main extending into the ostium   of the left circumflex coronary artery.  He was  then referred for a coronary artery bypass   grafting operation, and underwent this by Dr. Cortez on 4/11/2017.  At that time, he had   a LIMA to LAD, SVG to OM1, and SVG to OM 3.  He did well postoperatively, but continued   to have significant issues with rapid atrial flutter which was largely unresponsive to   amiodarone at the time.  He ultimately underwent a cavotricuspid isthmus atrial flutter   ablation by Dr. Gustafson on 4/18/2017.  A preoperative AR performed the day prior to   this did not show any evidence for thrombus formation.  He did develop bradycardia on   even minimal doses of metoprolol as an outpatient, and this had to be discontinued.      The patient presents today for follow-up.  His main complaint has been claudication type   symptoms and aching in his right lower extremity with exertion.  He is actually scheduled   to see Dr. Netta Mayorga and vascular surgery later this month.  His blood pressure has   been in excellent at home, and is 110/64 today.  He has not had any significant chest   discomfort.  He occasionally gets shortness of breath with moderate or heavy exertion,   although this is not changed.    Past Medical History:   Diagnosis Date   • Anxiety    • Arthritis    • Atrial flutter     Status post cavotricuspid isthmus ablation by Dr. Gustafson on 4/18/17   • Benign essential hypertension    • CAD (coronary artery disease)     3 vessel CABG 4/11/17 by Dr. Cortez: ROSARIO-prox LAD, SVG-OM1, SVG-OM3   • Carotid artery disease     Status post carotid endarterectomy - USG 4/10/17: 50-59% NICHELLE, 1-15% LICA.    • Colonic polyp    • DDD (degenerative disc disease), lumbosacral    • H/O bone density study 2013   • H/O complete eye exam 2014   • HLD (hyperlipidemia)    • Hypertension    • Lipid screening 05/31/2013   • Low back pain     physical therapy [facility] rehab 5-12-10   • Screening for prostate cancer 07/07/2015   • Stroke        Past Surgical History:   Procedure Laterality Date   •  CARDIAC CATHETERIZATION N/A 4/10/2017    Procedure: Left Heart Cath;  Surgeon: Marjorie Healy MD;  Location: [facility] CATH INVASIVE LOCATION;  Service:    • CARDIAC CATHETERIZATION N/A 4/10/2017    Procedure: Coronary angiography;  Surgeon: Marjorie Healy MD;  Location: [facility] CATH INVASIVE LOCATION;  Service:    • CARDIAC CATHETERIZATION N/A 4/10/2017    Procedure: Left ventriculography;  Surgeon: Marjorie Healy MD;  Location: [facility] CATH INVASIVE LOCATION;  Service:    • CARDIAC ELECTROPHYSIOLOGY PROCEDURE N/A 4/18/2017    Procedure: Ablation atrial flutter;  Surgeon: Jose Antonio Gustafson MD;  Location: [facility] CATH INVASIVE LOCATION;  Service:    • COLONOSCOPY  2010   • CORONARY ARTERY BYPASS GRAFT N/A 4/11/2017    Procedure: AR STERNOTOMY CORONARY ARTERY BYPASS GRAFT TIMES 3 USING LEFT INTERNAL MAMMARY ARTERY AND LEFT GREATER SAPHENOUS VEIN GRAFT PER ENDOSCOPIC VEIN HARVESTING AND PRP ;  Surgeon: Temo Cortez MD;  Location: [facility] OR;  Service:        Current Outpatient Prescriptions on File Prior to Visit   Medication Sig Dispense Refill   • clopidogrel (PLAVIX) 75 MG tablet Take 1 tablet by mouth Daily. 30 tablet 5   • Hydrocodone-Acetaminophen (VICODIN) 5-300 MG per tablet Take 1 tablet by mouth 2 (Two) Times a Day. 60 tablet 0   • nitroglycerin (NITROSTAT) 0.4 MG SL tablet Place 1 tablet under the tongue Every 5 (Five) Minutes As Needed for Chest Pain. Take no more than 3 doses in 15 minutes. 20 tablet 2   • ramipril (ALTACE) 5 MG capsule Take 1 capsule by mouth Daily. 30 capsule 5   • ranitidine (ZANTAC) 150 MG capsule Take 1 capsule by mouth Daily As Needed for Indigestion or Heartburn. 90 capsule 1   • rosuvastatin (CRESTOR) 10 MG tablet Take 1 tablet by mouth Daily. 30 tablet 5     No current facility-administered medications on file prior to visit.      Allergies as of 03/07/2018 - Reviewed 03/07/2018   Allergen Reaction Noted   • Penicillins  10/05/2016     Social History     Social History   •  "Marital status:      Spouse name: N/A   • Number of children: N/A   • Years of education: N/A     Occupational History   • Not on file.     Social History Main Topics   • Smoking status: Former Smoker   • Smokeless tobacco: Never Used   • Alcohol use No   • Drug use: No   • Sexual activity: Yes     Partners: Female     Other Topics Concern   • Not on file     Social History Narrative   • No narrative on file     Family History   Problem Relation Age of Onset   • Heart disease Mother    • Heart attack Mother    • Stroke Mother    • Heart disease Father    • Heart attack Father    • Stroke Father    • Arthritis Other    • Diabetes Other    • Hypertension Other    • Kidney disease Other      stones   • Hypertension Sister    • Heart attack Brother    • Heart disease Brother    • No Known Problems Maternal Grandmother    • No Known Problems Maternal Grandfather    • No Known Problems Paternal Grandmother    • No Known Problems Paternal Grandfather    • No Known Problems Brother    • Heart disease Brother    • Heart attack Brother    • Diabetes Brother        Review of Systems   Cardiovascular: Positive for claudication and dyspnea on exertion.   All other systems reviewed and are negative.     Objective:     Vitals:    03/07/18 0958   BP: 110/64   Pulse: 68   SpO2: 98%   Weight: 83.5 kg (184 lb)   Height: 172.7 cm (67.99\")     Body mass index is 27.98 kg/m².    Physical Exam   Constitutional: He is oriented to person, place, and time. He appears well-developed and well-nourished.   HENT:   Head: Normocephalic and atraumatic.   Eyes: Conjunctivae are normal.   Neck: Neck supple.   Cardiovascular: Normal rate and regular rhythm.  Exam reveals no gallop and no friction rub.    No murmur heard.  Pulmonary/Chest: Effort normal and breath sounds normal.   Abdominal: Soft. There is no tenderness.   Musculoskeletal: He exhibits no edema.   Neurological: He is alert and oriented to person, place, and time.   Skin: Skin is " warm.   Psychiatric: He has a normal mood and affect. His behavior is normal.     Lab Review:   Procedures    Cardiac Procedures:  1.  Echocardiogram on 4/8/2017: The ejection fraction was 55-60%.  The left atrium   was mildly to moderately dilated.  There was mild mitral regurgitation.  2.  Left heart catheterization on 4/10/2017: There was a distal 80% and severely   calcified stenosis in the left main extending into the ostium of left circumflex.  The left   circumflex was dominant, and did have the before mentioned 80% lesion in the ostial   aspect extending from the left main.  The LAD had 20% proximal disease.  The right   coronary artery was small, nondominant, and normal.  3.  Status post 3 vessel coronary artery bypass grafting on 4/11/2017 by Dr. Cortez:   LIMA to proximal LAD, SVG to OM 1, and SVG to OM 3.  4.  Transesophageal echo on 4/17/2017: The ejection fraction was 55%.  There was   moderate left atrial enlargement and mild to moderate right atrial enlargement.  The   atrial septum was redundant, but no PFO was seen on the saline study.  There was   no significant valvular disease.  5.  Status post cavotricuspid isthmus ablation for typical atrial flutter on 4/18/2017 by Dr. Gustafson.  6.  Carotid Doppler ultrasound on 4/10/2017: There was 50-59% stenosis in the right   internal carotid artery.  There was 1-15% disease in the left internal carotid artery.  7.  Echocardiogram on 10/24/2017: Ejection fraction was 56%.  There was grade 2   diastolic dysfunction.  There was aortic sclerosis without stenosis.    Assessment:       Diagnosis Plan   1. Coronary artery disease involving coronary bypass graft of native heart without angina pectoris     2. Status post catheter ablation of atrial flutter     3. Bilateral carotid artery disease       Plan:       The patient seems to be fairly stable.  Again, he has had claudication type symptoms in his   right lower extremity with exertion.  He already has  carotid artery disease, and it is entirely   possible that he could have peripheral disease as well.  He is seeing Dr. Netta Mayorga in   vascular surgery later this month.  He is going to continue on the Plavix indefinitely.  I   discontinued his aspirin as he was having significant bruising with both aspirin and Plavix.    His blood pressure has been excellent on the Ramipril 5 mg per day.  Again, he does not   take beta blockers as he has had significant bradycardia even with minimal doses.  He has   not had any recurrent atrial flutter since his ablation in April 2017.  He will need a carotid   Doppler ultrasound in the near future.  If Dr. Mayorga does not obtain this, I will do this at his   next visit.  He will continue on the Crestor at 10 mg a day.  For now, I made no new   changes.  I will plan on seeing him back in the office within the next 6 months.    Coronary Artery Disease  Assessment  • The patient has no angina    Plan  • Lifestyle modifications discussed include adhering to a heart healthy diet, avoidance of tobacco products, maintenance of a healthy weight, medication compliance, regular exercise and regular monitoring of cholesterol and blood pressure    Subjective - Objective  • There is a history of previous coronary artery bypass graft on or around 4/11/2017  • Current antiplatelet therapy includes aspirin 81 mg    Atrial Fibrillation and Atrial Flutter  Assessment  • The patient has paroxysmal atrial flutter  • Patient with atrial flutter ablation on 4/18/17 - no recurrence since and not on anticoagulation.

## 2018-03-28 ENCOUNTER — TRANSCRIBE ORDERS (OUTPATIENT)
Dept: ADMINISTRATIVE | Facility: HOSPITAL | Age: 73
End: 2018-03-28

## 2018-03-28 DIAGNOSIS — I73.9 PAD (PERIPHERAL ARTERY DISEASE) (HCC): Primary | ICD-10-CM

## 2018-04-04 ENCOUNTER — HOSPITAL ENCOUNTER (OUTPATIENT)
Dept: CARDIOLOGY | Facility: HOSPITAL | Age: 73
Discharge: HOME | End: 2018-04-04
Attending: SURGERY | Admitting: SURGERY

## 2018-04-04 DIAGNOSIS — I73.9 PAD (PERIPHERAL ARTERY DISEASE) (HCC): ICD-10-CM

## 2018-04-04 LAB
BH CV LOWER ARTERIAL LEFT ABI RATIO: 0.93
BH CV LOWER ARTERIAL LEFT DORSALIS PEDIS SYS MAX: 164 MMHG
BH CV LOWER ARTERIAL LEFT GREAT TOE SYS MAX: 135 MMHG
BH CV LOWER ARTERIAL LEFT HIGH THIGH SYS MAX: 188 MMHG
BH CV LOWER ARTERIAL LEFT LOW THIGH SYS MAX: 168 MMHG
BH CV LOWER ARTERIAL LEFT POPLITEAL SYS MAX: 188 MMHG
BH CV LOWER ARTERIAL LEFT POST TIBIAL SYS MAX: 162 MMHG
BH CV LOWER ARTERIAL LEFT TBI RATIO: 0.76
BH CV LOWER ARTERIAL RIGHT ABI RATIO: 0.7
BH CV LOWER ARTERIAL RIGHT DORSALIS PEDIS SYS MAX: 124 MMHG
BH CV LOWER ARTERIAL RIGHT GREAT TOE SYS MAX: 123 MMHG
BH CV LOWER ARTERIAL RIGHT HIGH THIGH SYS MAX: 188 MMHG
BH CV LOWER ARTERIAL RIGHT LOW THIGH SYS MAX: 187 MMHG
BH CV LOWER ARTERIAL RIGHT POPLITEAL SYS MAX: 118 MMHG
BH CV LOWER ARTERIAL RIGHT POST TIBIAL SYS MAX: 117 MMHG
BH CV LOWER ARTERIAL RIGHT TBI RATIO: 0.69
UPPER ARTERIAL LEFT ARM BRACHIAL SYS MAX: 177 MMHG
UPPER ARTERIAL RIGHT ARM BRACHIAL SYS MAX: 164 MMHG

## 2018-04-04 PROCEDURE — 93924 LWR XTR VASC STDY BILAT: CPT

## 2018-04-19 ENCOUNTER — RESULTS ENCOUNTER (OUTPATIENT)
Dept: FAMILY MEDICINE CLINIC | Facility: CLINIC | Age: 73
End: 2018-04-19

## 2018-04-19 DIAGNOSIS — I25.10 CHRONIC CORONARY ARTERY DISEASE: ICD-10-CM

## 2018-04-19 DIAGNOSIS — M51.37 DDD (DEGENERATIVE DISC DISEASE), LUMBOSACRAL: ICD-10-CM

## 2018-04-19 DIAGNOSIS — I63.9 CEREBROVASCULAR ACCIDENT (CVA), UNSPECIFIED MECHANISM (HCC): ICD-10-CM

## 2018-04-19 DIAGNOSIS — E78.5 HYPERLIPIDEMIA, UNSPECIFIED HYPERLIPIDEMIA TYPE: ICD-10-CM

## 2018-04-20 DIAGNOSIS — M51.37 DDD (DEGENERATIVE DISC DISEASE), LUMBOSACRAL: ICD-10-CM

## 2018-04-20 RX ORDER — HYDROCODONE BITARTRATE AND ACETAMINOPHEN 5; 300 MG/1; MG/1
1 TABLET ORAL 2 TIMES DAILY
Qty: 60 TABLET | Refills: 0 | Status: SHIPPED | OUTPATIENT
Start: 2018-04-20 | End: 2018-05-23 | Stop reason: SDUPTHER

## 2018-05-09 DIAGNOSIS — I25.83 CORONARY ARTERY DISEASE DUE TO LIPID RICH PLAQUE: ICD-10-CM

## 2018-05-09 DIAGNOSIS — E78.2 MIXED HYPERLIPIDEMIA: ICD-10-CM

## 2018-05-09 DIAGNOSIS — I25.10 CORONARY ARTERY DISEASE DUE TO LIPID RICH PLAQUE: ICD-10-CM

## 2018-05-09 RX ORDER — RAMIPRIL 5 MG/1
CAPSULE ORAL
Qty: 30 CAPSULE | Refills: 0 | Status: SHIPPED | OUTPATIENT
Start: 2018-05-09 | End: 2018-05-23 | Stop reason: SDUPTHER

## 2018-05-09 RX ORDER — ROSUVASTATIN CALCIUM 10 MG/1
TABLET, COATED ORAL
Qty: 30 TABLET | Refills: 0 | Status: SHIPPED | OUTPATIENT
Start: 2018-05-09 | End: 2018-05-23 | Stop reason: SINTOL

## 2018-05-22 LAB
ALBUMIN SERPL-MCNC: 4.3 G/DL (ref 3.5–5.2)
ALBUMIN/GLOB SERPL: 1.5 G/DL
ALP SERPL-CCNC: 91 U/L (ref 39–117)
ALT SERPL-CCNC: 22 U/L (ref 1–41)
AST SERPL-CCNC: 33 U/L (ref 1–40)
BASOPHILS # BLD AUTO: 0.01 10*3/MM3 (ref 0–0.2)
BASOPHILS NFR BLD AUTO: 0.1 % (ref 0–1.5)
BILIRUB SERPL-MCNC: 0.9 MG/DL (ref 0.1–1.2)
BUN SERPL-MCNC: 17 MG/DL (ref 8–23)
BUN/CREAT SERPL: 14.8 (ref 7–25)
CALCIUM SERPL-MCNC: 9.6 MG/DL (ref 8.6–10.5)
CHLORIDE SERPL-SCNC: 102 MMOL/L (ref 98–107)
CHOLEST SERPL-MCNC: 253 MG/DL (ref 0–200)
CO2 SERPL-SCNC: 23.3 MMOL/L (ref 22–29)
CREAT SERPL-MCNC: 1.15 MG/DL (ref 0.76–1.27)
EOSINOPHIL # BLD AUTO: 0.04 10*3/MM3 (ref 0–0.7)
EOSINOPHIL NFR BLD AUTO: 0.5 % (ref 0.3–6.2)
ERYTHROCYTE [DISTWIDTH] IN BLOOD BY AUTOMATED COUNT: 15.1 % (ref 11.5–14.5)
GFR SERPLBLD CREATININE-BSD FMLA CKD-EPI: 62 ML/MIN/1.73
GFR SERPLBLD CREATININE-BSD FMLA CKD-EPI: 76 ML/MIN/1.73
GLOBULIN SER CALC-MCNC: 2.9 GM/DL
GLUCOSE SERPL-MCNC: 82 MG/DL (ref 65–99)
HCT VFR BLD AUTO: 51.5 % (ref 40.4–52.2)
HDLC SERPL-MCNC: 59 MG/DL (ref 40–60)
HGB BLD-MCNC: 16.2 G/DL (ref 13.7–17.6)
IMM GRANULOCYTES # BLD: 0.07 10*3/MM3 (ref 0–0.03)
IMM GRANULOCYTES NFR BLD: 0.9 % (ref 0–0.5)
LDLC SERPL CALC-MCNC: 182 MG/DL (ref 0–100)
LYMPHOCYTES # BLD AUTO: 1.6 10*3/MM3 (ref 0.9–4.8)
LYMPHOCYTES NFR BLD AUTO: 21.2 % (ref 19.6–45.3)
MCH RBC QN AUTO: 29.5 PG (ref 27–32.7)
MCHC RBC AUTO-ENTMCNC: 31.5 G/DL (ref 32.6–36.4)
MCV RBC AUTO: 93.8 FL (ref 79.8–96.2)
MONOCYTES # BLD AUTO: 1.1 10*3/MM3 (ref 0.2–1.2)
MONOCYTES NFR BLD AUTO: 14.6 % (ref 5–12)
NEUTROPHILS # BLD AUTO: 4.78 10*3/MM3 (ref 1.9–8.1)
NEUTROPHILS NFR BLD AUTO: 63.6 % (ref 42.7–76)
PLATELET # BLD AUTO: 373 10*3/MM3 (ref 140–500)
POTASSIUM SERPL-SCNC: 4.9 MMOL/L (ref 3.5–5.2)
PROT SERPL-MCNC: 7.2 G/DL (ref 6–8.5)
RBC # BLD AUTO: 5.49 10*6/MM3 (ref 4.6–6)
SODIUM SERPL-SCNC: 141 MMOL/L (ref 136–145)
TRIGL SERPL-MCNC: 60 MG/DL (ref 0–150)
TSH SERPL DL<=0.005 MIU/L-ACNC: 2.56 MIU/ML (ref 0.27–4.2)
VLDLC SERPL CALC-MCNC: 12 MG/DL (ref 5–40)
WBC # BLD AUTO: 7.53 10*3/MM3 (ref 4.5–10.7)

## 2018-05-23 ENCOUNTER — OFFICE VISIT (OUTPATIENT)
Dept: FAMILY MEDICINE CLINIC | Facility: CLINIC | Age: 73
End: 2018-05-23

## 2018-05-23 VITALS
BODY MASS INDEX: 27 KG/M2 | TEMPERATURE: 97.6 F | OXYGEN SATURATION: 96 % | WEIGHT: 172 LBS | HEART RATE: 75 BPM | SYSTOLIC BLOOD PRESSURE: 150 MMHG | DIASTOLIC BLOOD PRESSURE: 74 MMHG | HEIGHT: 67 IN | RESPIRATION RATE: 16 BRPM

## 2018-05-23 DIAGNOSIS — I63.9 CEREBROVASCULAR ACCIDENT (CVA), UNSPECIFIED MECHANISM (HCC): ICD-10-CM

## 2018-05-23 DIAGNOSIS — I25.83 CORONARY ARTERY DISEASE DUE TO LIPID RICH PLAQUE: Primary | ICD-10-CM

## 2018-05-23 DIAGNOSIS — I25.10 CORONARY ARTERY DISEASE DUE TO LIPID RICH PLAQUE: Primary | ICD-10-CM

## 2018-05-23 DIAGNOSIS — I10 BENIGN ESSENTIAL HYPERTENSION: ICD-10-CM

## 2018-05-23 DIAGNOSIS — I25.810 CORONARY ARTERY DISEASE INVOLVING CORONARY BYPASS GRAFT OF NATIVE HEART WITHOUT ANGINA PECTORIS: ICD-10-CM

## 2018-05-23 DIAGNOSIS — M51.37 DDD (DEGENERATIVE DISC DISEASE), LUMBOSACRAL: ICD-10-CM

## 2018-05-23 DIAGNOSIS — R39.14 FEELING OF INCOMPLETE BLADDER EMPTYING: ICD-10-CM

## 2018-05-23 PROCEDURE — 99214 OFFICE O/P EST MOD 30 MIN: CPT | Performed by: FAMILY MEDICINE

## 2018-05-23 RX ORDER — NITROGLYCERIN 0.4 MG/1
0.4 TABLET SUBLINGUAL
Qty: 20 TABLET | Refills: 2 | Status: SHIPPED | OUTPATIENT
Start: 2018-05-23 | End: 2018-09-17 | Stop reason: SDUPTHER

## 2018-05-23 RX ORDER — RANITIDINE 150 MG/1
150 CAPSULE ORAL 2 TIMES DAILY
Qty: 180 CAPSULE | Refills: 1 | Status: SHIPPED | OUTPATIENT
Start: 2018-05-23 | End: 2018-08-22 | Stop reason: HOSPADM

## 2018-05-23 RX ORDER — HYDROCODONE BITARTRATE AND ACETAMINOPHEN 5; 300 MG/1; MG/1
1 TABLET ORAL 2 TIMES DAILY
Qty: 60 TABLET | Refills: 0 | Status: SHIPPED | OUTPATIENT
Start: 2018-05-23 | End: 2018-06-25 | Stop reason: SDUPTHER

## 2018-05-23 RX ORDER — RAMIPRIL 5 MG/1
5 CAPSULE ORAL DAILY
Qty: 90 CAPSULE | Refills: 1 | Status: SHIPPED | OUTPATIENT
Start: 2018-05-23 | End: 2018-11-26 | Stop reason: SDUPTHER

## 2018-05-23 RX ORDER — CLOPIDOGREL BISULFATE 75 MG/1
75 TABLET ORAL DAILY
Qty: 90 TABLET | Refills: 1 | Status: SHIPPED | OUTPATIENT
Start: 2018-05-23 | End: 2018-06-19 | Stop reason: SDUPTHER

## 2018-05-23 RX ORDER — CILOSTAZOL 50 MG/1
50 TABLET ORAL 2 TIMES DAILY
COMMUNITY
End: 2018-06-19

## 2018-05-23 RX ORDER — ALUMINA, MAGNESIA, AND SIMETHICONE 2400; 2400; 240 MG/30ML; MG/30ML; MG/30ML
SUSPENSION ORAL EVERY 6 HOURS PRN
COMMUNITY
End: 2018-09-11 | Stop reason: ALTCHOICE

## 2018-05-23 NOTE — PROGRESS NOTES
Subjective   Madhu Santoro is a 72 y.o. male.     History of Present Illness   Chief Complaint:   Chief Complaint   Patient presents with   • Hyperlipidemia   • Hypertension   • Back Pain       Madhu Santoro 72 y.o. male who presents today for Medical Management of the below listed issues and medication refills. The patient has read and signed the Psychiatric Controlled Substance Contract.  I will continue to see patient for regular follow up appointments.  They are well controlled on their medication.  RUTH has been reviewed by me and is updated every 3 months. The patient is aware of the potential for addiction and dependence. Reviewed labs  On plavix and doing good on zantac 150mg bid. Dr GIORDANO added another  Drug on top of plavix  For pad and he is unsure of name of drug. I reviewed meds and labs. Lipid panel worse off crestor 10mg. He stopped crestor 10mg because of leg aches, try to start crestor 5mg and see if can take this without aches. Refill meds and call me 1 month and see if can do without leg pain.  bp at home 128/70   he has a problem list of   Patient Active Problem List   Diagnosis   • Anxiety   • Arthritis   • Chronic coronary artery disease   • HLD (hyperlipidemia)   • Benign essential hypertension   • DDD (degenerative disc disease), lumbosacral   • Cerebrovascular accident   • Encounter for screening for cardiovascular disorders   • Gastroesophageal reflux disease   • Hyperlipidemia   • Stenosis of carotid artery   • Former smoker   • History of cardiac catheterization   • S/P ablation of atrial flutter   • Typical atrial flutter   • S/P CABG (coronary artery bypass graft)   .  Since the last visit, he has overall felt well.  he has been compliant with   Current Outpatient Prescriptions:   •  aluminum-magnesium hydroxide-simethicone (MAALOX MAX) 400-400-40 MG/5ML suspension, Take  by mouth Every 6 (Six) Hours As Needed for Indigestion or Heartburn., Disp: , Rfl:   •  clopidogrel  "(PLAVIX) 75 MG tablet, Take 1 tablet by mouth Daily., Disp: 30 tablet, Rfl: 5  •  Hydrocodone-Acetaminophen (VICODIN) 5-300 MG per tablet, Take 1 tablet by mouth 2 (Two) Times a Day., Disp: 60 tablet, Rfl: 0  •  nitroglycerin (NITROSTAT) 0.4 MG SL tablet, Place 1 tablet under the tongue Every 5 (Five) Minutes As Needed for Chest Pain. Take no more than 3 doses in 15 minutes., Disp: 20 tablet, Rfl: 2  •  ramipril (ALTACE) 5 MG capsule, TAKE ONE CAPSULE BY MOUTH DAILY, Disp: 30 capsule, Rfl: 0  •  ranitidine (ZANTAC) 150 MG capsule, Take 1 capsule by mouth Daily As Needed for Indigestion or Heartburn., Disp: 90 capsule, Rfl: 1.  he denies medication side effects.    All of the chronic condition(s) listed above are stable w/o issues.    /71   Pulse 75   Temp 97.6 °F (36.4 °C)   Resp 16   Ht 170.2 cm (67\")   Wt 78 kg (172 lb)   SpO2 96%   BMI 26.94 kg/m²     Results for orders placed or performed in visit on 04/19/18   Comprehensive metabolic panel   Result Value Ref Range    Glucose 82 65 - 99 mg/dL    BUN 17 8 - 23 mg/dL    Creatinine 1.15 0.76 - 1.27 mg/dL    eGFR Non African Am 62 >60 mL/min/1.73    eGFR African Am 76 >60 mL/min/1.73    BUN/Creatinine Ratio 14.8 7.0 - 25.0    Sodium 141 136 - 145 mmol/L    Potassium 4.9 3.5 - 5.2 mmol/L    Chloride 102 98 - 107 mmol/L    Total CO2 23.3 22.0 - 29.0 mmol/L    Calcium 9.6 8.6 - 10.5 mg/dL    Total Protein 7.2 6.0 - 8.5 g/dL    Albumin 4.30 3.50 - 5.20 g/dL    Globulin 2.9 gm/dL    A/G Ratio 1.5 g/dL    Total Bilirubin 0.9 0.1 - 1.2 mg/dL    Alkaline Phosphatase 91 39 - 117 U/L    AST (SGOT) 33 1 - 40 U/L    ALT (SGPT) 22 1 - 41 U/L   Lipid panel   Result Value Ref Range    Total Cholesterol 253 (H) 0 - 200 mg/dL    Triglycerides 60 0 - 150 mg/dL    HDL Cholesterol 59 40 - 60 mg/dL    VLDL Cholesterol 12 5 - 40 mg/dL    LDL Cholesterol  182 (H) 0 - 100 mg/dL   TSH   Result Value Ref Range    TSH 2.560 0.270 - 4.200 mIU/mL   CBC and Differential   Result " Value Ref Range    WBC 7.53 4.50 - 10.70 10*3/mm3    RBC 5.49 4.60 - 6.00 10*6/mm3    Hemoglobin 16.2 13.7 - 17.6 g/dL    Hematocrit 51.5 40.4 - 52.2 %    MCV 93.8 79.8 - 96.2 fL    MCH 29.5 27.0 - 32.7 pg    MCHC 31.5 (L) 32.6 - 36.4 g/dL    RDW 15.1 (H) 11.5 - 14.5 %    Platelets 373 140 - 500 10*3/mm3    Neutrophil Rel % 63.6 42.7 - 76.0 %    Lymphocyte Rel % 21.2 19.6 - 45.3 %    Monocyte Rel % 14.6 (H) 5.0 - 12.0 %    Eosinophil Rel % 0.5 0.3 - 6.2 %    Basophil Rel % 0.1 0.0 - 1.5 %    Neutrophils Absolute 4.78 1.90 - 8.10 10*3/mm3    Lymphocytes Absolute 1.60 0.90 - 4.80 10*3/mm3    Monocytes Absolute 1.10 0.20 - 1.20 10*3/mm3    Eosinophils Absolute 0.04 0.00 - 0.70 10*3/mm3    Basophils Absolute 0.01 0.00 - 0.20 10*3/mm3    Immature Granulocyte Rel % 0.9 (H) 0.0 - 0.5 %    Immature Grans Absolute 0.07 (H) 0.00 - 0.03 10*3/mm3           The following portions of the patient's history were reviewed and updated as appropriate: allergies, current medications, past family history, past medical history, past social history, past surgical history and problem list.    Review of Systems   Constitutional: Negative for appetite change, diaphoresis, fatigue and fever.   HENT: Negative for congestion, nosebleeds and sinus pressure.    Eyes: Negative for pain and redness.   Respiratory: Negative for chest tightness and shortness of breath.    Cardiovascular: Negative for chest pain.   Gastrointestinal: Negative for abdominal pain.   Genitourinary: Negative for difficulty urinating.   Musculoskeletal: Negative for arthralgias and gait problem.   Skin: Negative for rash.   Neurological: Negative for dizziness and headaches.   Psychiatric/Behavioral: Negative for confusion. The patient is not hyperactive.        Objective   Physical Exam   Constitutional: He is oriented to person, place, and time. He appears well-developed and well-nourished.   HENT:   Head: Normocephalic and atraumatic.   Mouth/Throat: Oropharynx is clear  and moist.   Eyes: EOM are normal. Pupils are equal, round, and reactive to light.   Neck: Normal range of motion. No thyromegaly present.   Cardiovascular: Normal rate and regular rhythm.    Pulmonary/Chest: Effort normal and breath sounds normal.   Abdominal: Soft. Bowel sounds are normal.   Musculoskeletal: Normal range of motion.   Lymphadenopathy:     He has no cervical adenopathy.   Neurological: He is alert and oriented to person, place, and time.   Skin: Skin is warm and dry. No rash noted.   Bruise arms   Psychiatric: He has a normal mood and affect. His behavior is normal.   Nursing note and vitals reviewed.      Assessment/Plan   Madhu was seen today for hyperlipidemia, hypertension and back pain.    Diagnoses and all orders for this visit:    Coronary artery disease due to lipid rich plaque  -     ramipril (ALTACE) 5 MG capsule; Take 1 capsule by mouth Daily.  -     Comprehensive metabolic panel; Future  -     Lipid panel; Future  -     CBC and Differential; Future  -     TSH; Future  -     PSA; Future    Cerebrovascular accident (CVA), unspecified mechanism  -     clopidogrel (PLAVIX) 75 MG tablet; Take 1 tablet by mouth Daily.  -     Comprehensive metabolic panel; Future  -     Lipid panel; Future  -     CBC and Differential; Future  -     TSH; Future  -     PSA; Future    Benign essential hypertension  -     clopidogrel (PLAVIX) 75 MG tablet; Take 1 tablet by mouth Daily.  -     Comprehensive metabolic panel; Future  -     Lipid panel; Future  -     CBC and Differential; Future  -     TSH; Future  -     PSA; Future    Coronary artery disease involving coronary bypass graft of native heart without angina pectoris  -     nitroglycerin (NITROSTAT) 0.4 MG SL tablet; Place 1 tablet under the tongue Every 5 (Five) Minutes As Needed for Chest Pain. Take no more than 3 doses in 15 minutes.  -     Comprehensive metabolic panel; Future  -     Lipid panel; Future  -     CBC and Differential; Future  -     TSH;  Future  -     PSA; Future    DDD (degenerative disc disease), lumbosacral  -     Hydrocodone-Acetaminophen (VICODIN) 5-300 MG per tablet; Take 1 tablet by mouth 2 (Two) Times a Day.  -     Comprehensive metabolic panel; Future  -     Lipid panel; Future  -     CBC and Differential; Future  -     TSH; Future  -     PSA; Future    Feeling of incomplete bladder emptying   -     PSA; Future    Other orders  -     ranitidine (ZANTAC) 150 MG capsule; Take 1 capsule by mouth 2 (Two) Times a Day for 90 days.

## 2018-05-23 NOTE — PATIENT INSTRUCTIONS
Exercise 30 minutes most days of the week  Sleep 6-8 hours each night if possible  Low fat, low cholesterol diet   we discussed prescribed medications and how to take them   make sure you get results of any labs/studies ordered today  Low glycemic index diet        Patients must be seen every 3 months for their presription medication review and refill required by [place] law.  Patient has been advised on the potential for addiction  and dependence to their prescribed scheduled medication.  RUTH was obtained today and reviewed. This was scanned into the patient's chart.                     Bring bp cuff in next visit    Call me 1 month about crestor

## 2018-06-19 DIAGNOSIS — I10 BENIGN ESSENTIAL HYPERTENSION: ICD-10-CM

## 2018-06-19 DIAGNOSIS — I63.9 CEREBROVASCULAR ACCIDENT (CVA), UNSPECIFIED MECHANISM (HCC): ICD-10-CM

## 2018-06-19 RX ORDER — CLOPIDOGREL BISULFATE 75 MG/1
TABLET ORAL
Qty: 30 TABLET | Refills: 1 | Status: SHIPPED | OUTPATIENT
Start: 2018-06-19 | End: 2018-08-22 | Stop reason: SDUPTHER

## 2018-06-19 RX ORDER — ROSUVASTATIN CALCIUM 10 MG/1
TABLET, COATED ORAL
Qty: 30 TABLET | Refills: 0 | OUTPATIENT
Start: 2018-06-19

## 2018-06-20 RX ORDER — ROSUVASTATIN CALCIUM 10 MG/1
10 TABLET, COATED ORAL DAILY
Qty: 90 TABLET | Refills: 1 | Status: SHIPPED | OUTPATIENT
Start: 2018-06-20 | End: 2018-06-22 | Stop reason: SDUPTHER

## 2018-06-22 RX ORDER — ROSUVASTATIN CALCIUM 10 MG/1
TABLET, COATED ORAL
Qty: 30 TABLET | Refills: 5 | Status: SHIPPED | OUTPATIENT
Start: 2018-06-22 | End: 2018-11-26 | Stop reason: SDUPTHER

## 2018-06-25 DIAGNOSIS — M51.37 DDD (DEGENERATIVE DISC DISEASE), LUMBOSACRAL: ICD-10-CM

## 2018-06-25 RX ORDER — HYDROCODONE BITARTRATE AND ACETAMINOPHEN 5; 300 MG/1; MG/1
1 TABLET ORAL 2 TIMES DAILY
Qty: 60 TABLET | Refills: 0 | Status: SHIPPED | OUTPATIENT
Start: 2018-06-25 | End: 2018-07-25 | Stop reason: SDUPTHER

## 2018-07-25 DIAGNOSIS — M51.37 DDD (DEGENERATIVE DISC DISEASE), LUMBOSACRAL: ICD-10-CM

## 2018-07-25 RX ORDER — HYDROCODONE BITARTRATE AND ACETAMINOPHEN 5; 300 MG/1; MG/1
1 TABLET ORAL 2 TIMES DAILY
Qty: 60 TABLET | Refills: 0 | Status: SHIPPED | OUTPATIENT
Start: 2018-07-25 | End: 2018-08-22 | Stop reason: SDUPTHER

## 2018-08-22 ENCOUNTER — OFFICE VISIT (OUTPATIENT)
Dept: FAMILY MEDICINE CLINIC | Facility: CLINIC | Age: 73
End: 2018-08-22

## 2018-08-22 VITALS
OXYGEN SATURATION: 94 % | TEMPERATURE: 97.6 F | SYSTOLIC BLOOD PRESSURE: 138 MMHG | HEIGHT: 67 IN | HEART RATE: 51 BPM | RESPIRATION RATE: 16 BRPM | BODY MASS INDEX: 27.31 KG/M2 | WEIGHT: 174 LBS | DIASTOLIC BLOOD PRESSURE: 70 MMHG

## 2018-08-22 DIAGNOSIS — I48.3 TYPICAL ATRIAL FLUTTER (HCC): ICD-10-CM

## 2018-08-22 DIAGNOSIS — I63.9 CEREBROVASCULAR ACCIDENT (CVA), UNSPECIFIED MECHANISM (HCC): Primary | ICD-10-CM

## 2018-08-22 DIAGNOSIS — R39.14 FEELING OF INCOMPLETE BLADDER EMPTYING: ICD-10-CM

## 2018-08-22 DIAGNOSIS — F41.9 ANXIETY: ICD-10-CM

## 2018-08-22 DIAGNOSIS — I10 BENIGN ESSENTIAL HYPERTENSION: ICD-10-CM

## 2018-08-22 DIAGNOSIS — M51.37 DDD (DEGENERATIVE DISC DISEASE), LUMBOSACRAL: ICD-10-CM

## 2018-08-22 PROCEDURE — 99214 OFFICE O/P EST MOD 30 MIN: CPT | Performed by: FAMILY MEDICINE

## 2018-08-22 RX ORDER — HYDROCODONE BITARTRATE AND ACETAMINOPHEN 5; 300 MG/1; MG/1
1 TABLET ORAL 2 TIMES DAILY
Qty: 60 TABLET | Refills: 0 | Status: SHIPPED | OUTPATIENT
Start: 2018-08-22 | End: 2018-09-26 | Stop reason: SDUPTHER

## 2018-08-22 RX ORDER — CLOPIDOGREL BISULFATE 75 MG/1
75 TABLET ORAL DAILY
Qty: 30 TABLET | Refills: 5 | Status: SHIPPED | OUTPATIENT
Start: 2018-08-22 | End: 2018-11-26 | Stop reason: SDUPTHER

## 2018-08-22 NOTE — PROGRESS NOTES
Subjective   Chief Complaint:   Chief Complaint   Patient presents with   • Hyperlipidemia         History of Present Illness             Madhu Santoro 72 y.o. male who presents today for Medical Management of the below listed issues and medication refills.   Refill plavix. He is not taking 2nd drug from dr Phelps. Refill  vicodin 5-300. 1 bid prn pain  For arthritis back and joints.  Ok on altace and crestor. I reviewed meds, patricia done. Stable from cva. Discussed  vicodin and taking bid prn pain.  Log b p good. cva stable.  Sees dr phelps q 6months.  cva stable.    ICD-10-CM ICD-9-CM   1. Cerebrovascular accident (CVA), unspecified mechanism (CMS/HCC) I63.9 434.91   2. Benign essential hypertension I10 401.1   3. DDD (degenerative disc disease), lumbosacral M51.37 722.52        he has a problem list of   Patient Active Problem List   Diagnosis   • Anxiety   • Arthritis   • Chronic coronary artery disease   • HLD (hyperlipidemia)   • Benign essential hypertension   • DDD (degenerative disc disease), lumbosacral   • Cerebrovascular accident (CMS/HCC)   • Encounter for screening for cardiovascular disorders   • Gastroesophageal reflux disease   • Hyperlipidemia   • Stenosis of carotid artery   • Former smoker   • History of cardiac catheterization   • S/P ablation of atrial flutter   • Typical atrial flutter (CMS/HCC)   • S/P CABG (coronary artery bypass graft)   .  Since the last visit, he has overall felt well.  he has been compliant with   Current Outpatient Prescriptions:   •  aluminum-magnesium hydroxide-simethicone (MAALOX MAX) 400-400-40 MG/5ML suspension, Take  by mouth Every 6 (Six) Hours As Needed for Indigestion or Heartburn., Disp: , Rfl:   •  clopidogrel (PLAVIX) 75 MG tablet, TAKE ONE TABLET BY MOUTH DAILY, Disp: 30 tablet, Rfl: 1  •  Hydrocodone-Acetaminophen (VICODIN) 5-300 MG per tablet, Take 1 tablet by mouth 2 (Two) Times a Day., Disp: 60 tablet, Rfl: 0  •  nitroglycerin (NITROSTAT) 0.4 MG SL  "tablet, Place 1 tablet under the tongue Every 5 (Five) Minutes As Needed for Chest Pain. Take no more than 3 doses in 15 minutes., Disp: 20 tablet, Rfl: 2  •  ramipril (ALTACE) 5 MG capsule, Take 1 capsule by mouth Daily., Disp: 90 capsule, Rfl: 1  •  rosuvastatin (CRESTOR) 10 MG tablet, TAKE ONE TABLET BY MOUTH DAILY, Disp: 30 tablet, Rfl: 5.  he denies medication side effects.    All of the chronic condition(s) listed above are stable w/o issues.    /70   Pulse 51   Temp 97.6 °F (36.4 °C)   Resp 16   Ht 170.2 cm (67\")   Wt 78.9 kg (174 lb)   SpO2 94%   BMI 27.25 kg/m²     Results for orders placed or performed in visit on 04/19/18   Comprehensive metabolic panel   Result Value Ref Range    Glucose 82 65 - 99 mg/dL    BUN 17 8 - 23 mg/dL    Creatinine 1.15 0.76 - 1.27 mg/dL    eGFR Non African Am 62 >60 mL/min/1.73    eGFR African Am 76 >60 mL/min/1.73    BUN/Creatinine Ratio 14.8 7.0 - 25.0    Sodium 141 136 - 145 mmol/L    Potassium 4.9 3.5 - 5.2 mmol/L    Chloride 102 98 - 107 mmol/L    Total CO2 23.3 22.0 - 29.0 mmol/L    Calcium 9.6 8.6 - 10.5 mg/dL    Total Protein 7.2 6.0 - 8.5 g/dL    Albumin 4.30 3.50 - 5.20 g/dL    Globulin 2.9 gm/dL    A/G Ratio 1.5 g/dL    Total Bilirubin 0.9 0.1 - 1.2 mg/dL    Alkaline Phosphatase 91 39 - 117 U/L    AST (SGOT) 33 1 - 40 U/L    ALT (SGPT) 22 1 - 41 U/L   Lipid panel   Result Value Ref Range    Total Cholesterol 253 (H) 0 - 200 mg/dL    Triglycerides 60 0 - 150 mg/dL    HDL Cholesterol 59 40 - 60 mg/dL    VLDL Cholesterol 12 5 - 40 mg/dL    LDL Cholesterol  182 (H) 0 - 100 mg/dL   TSH   Result Value Ref Range    TSH 2.560 0.270 - 4.200 mIU/mL   CBC and Differential   Result Value Ref Range    WBC 7.53 4.50 - 10.70 10*3/mm3    RBC 5.49 4.60 - 6.00 10*6/mm3    Hemoglobin 16.2 13.7 - 17.6 g/dL    Hematocrit 51.5 40.4 - 52.2 %    MCV 93.8 79.8 - 96.2 fL    MCH 29.5 27.0 - 32.7 pg    MCHC 31.5 (L) 32.6 - 36.4 g/dL    RDW 15.1 (H) 11.5 - 14.5 %    Platelets 373 " 140 - 500 10*3/mm3    Neutrophil Rel % 63.6 42.7 - 76.0 %    Lymphocyte Rel % 21.2 19.6 - 45.3 %    Monocyte Rel % 14.6 (H) 5.0 - 12.0 %    Eosinophil Rel % 0.5 0.3 - 6.2 %    Basophil Rel % 0.1 0.0 - 1.5 %    Neutrophils Absolute 4.78 1.90 - 8.10 10*3/mm3    Lymphocytes Absolute 1.60 0.90 - 4.80 10*3/mm3    Monocytes Absolute 1.10 0.20 - 1.20 10*3/mm3    Eosinophils Absolute 0.04 0.00 - 0.70 10*3/mm3    Basophils Absolute 0.01 0.00 - 0.20 10*3/mm3    Immature Granulocyte Rel % 0.9 (H) 0.0 - 0.5 %    Immature Grans Absolute 0.07 (H) 0.00 - 0.03 10*3/mm3             The following portions of the patient's history were reviewed and updated as appropriate: allergies, current medications, past family history, past medical history, past social history, past surgical history and problem list.    Review of Systems   Constitutional: Negative for activity change and fatigue.   HENT: Negative for sinus pain and sinus pressure.    Eyes: Negative for visual disturbance.   Respiratory: Negative for chest tightness.    Cardiovascular: Negative for chest pain.   Gastrointestinal: Negative for abdominal pain.   Genitourinary: Negative for difficulty urinating and dysuria.   Musculoskeletal: Positive for back pain. Negative for gait problem.   Neurological: Negative for headaches.   Psychiatric/Behavioral: Negative for confusion.       Objective   Physical Exam   Constitutional: He is oriented to person, place, and time. He appears well-developed.   HENT:   Mouth/Throat: Oropharynx is clear and moist.   Eyes: Pupils are equal, round, and reactive to light.   Neck: Normal range of motion.   Cardiovascular: Normal rate and regular rhythm.    Pulmonary/Chest: Effort normal and breath sounds normal.   Abdominal: Soft.   Genitourinary: Prostate normal.   Musculoskeletal: He exhibits no edema.   Neurological: He is alert and oriented to person, place, and time.   Skin: Skin is warm and dry.   Psychiatric: He has a normal mood and affect.    Nursing note and vitals reviewed.      Assessment/Plan   Madhu was seen today for hyperlipidemia.    Diagnoses and all orders for this visit:    Cerebrovascular accident (CVA), unspecified mechanism (CMS/HCC)  -     clopidogrel (PLAVIX) 75 MG tablet; Take 1 tablet by mouth Daily.    Benign essential hypertension  -     clopidogrel (PLAVIX) 75 MG tablet; Take 1 tablet by mouth Daily.    DDD (degenerative disc disease), lumbosacral  -     Hydrocodone-Acetaminophen (VICODIN) 5-300 MG per tablet; Take 1 tablet by mouth 2 (Two) Times a Day.

## 2018-09-11 ENCOUNTER — OFFICE VISIT (OUTPATIENT)
Dept: CARDIOLOGY | Facility: CLINIC | Age: 73
End: 2018-09-11

## 2018-09-11 VITALS
HEIGHT: 68 IN | SYSTOLIC BLOOD PRESSURE: 178 MMHG | BODY MASS INDEX: 26.83 KG/M2 | HEART RATE: 62 BPM | WEIGHT: 177 LBS | DIASTOLIC BLOOD PRESSURE: 92 MMHG

## 2018-09-11 DIAGNOSIS — I77.9 BILATERAL CAROTID ARTERY DISEASE (HCC): ICD-10-CM

## 2018-09-11 DIAGNOSIS — Z95.1 S/P CABG (CORONARY ARTERY BYPASS GRAFT): Primary | ICD-10-CM

## 2018-09-11 DIAGNOSIS — I25.810 CORONARY ARTERY DISEASE INVOLVING CORONARY BYPASS GRAFT OF NATIVE HEART WITHOUT ANGINA PECTORIS: ICD-10-CM

## 2018-09-11 DIAGNOSIS — I73.9 PAD (PERIPHERAL ARTERY DISEASE) (HCC): ICD-10-CM

## 2018-09-11 DIAGNOSIS — Z98.890 HISTORY OF LEFT-SIDED CAROTID ENDARTERECTOMY: ICD-10-CM

## 2018-09-11 DIAGNOSIS — Z98.890 S/P ABLATION OF ATRIAL FLUTTER: ICD-10-CM

## 2018-09-11 DIAGNOSIS — Z86.79 S/P ABLATION OF ATRIAL FLUTTER: ICD-10-CM

## 2018-09-11 PROCEDURE — 99214 OFFICE O/P EST MOD 30 MIN: CPT | Performed by: NURSE PRACTITIONER

## 2018-09-11 PROCEDURE — 93000 ELECTROCARDIOGRAM COMPLETE: CPT | Performed by: NURSE PRACTITIONER

## 2018-09-11 RX ORDER — ASPIRIN 81 MG/1
81 TABLET ORAL DAILY
COMMUNITY
End: 2018-09-11 | Stop reason: SINTOL

## 2018-09-11 NOTE — PROGRESS NOTES
Date of Office Visit: 2018  Encounter Provider: MY Lazaro  Place of Service: [facility] CARDIOLOGY  Patient Name: Madhu Santoro  :1945    Chief Complaint   Patient presents with   • Coronary Artery Disease   • Cerebrovascular Accident   • Atrial Flutter   • Hypertension   • Hyperlipidemia   :     HPI: Madhu Santoro is a 72 y.o. male is a patient of Dr. Wright. I am seeing him today for the first time and have reviewed his record.     His past medical history is significant of coronary artery disease status post coronary artery bypass grafting ×3, atrial flutter status post post-ablation, carotid artery disease status post carotid endarterectomy, hypertension, hyperlipidemia, CVA.     He suffered a CVA prior to left carotid endarterectomy in approximately  or .    He presented to [facility] emergency department in 2017 after visiting his primary care physician who noted a rapid heart rate of 150.  He was found to be in typical atrial flutter which was a new diagnosis for him at that time he was asymptomatic with that.  He reported some chest pressure radiating into the throat earlier that week.  He converted to sinus rhythm with an amiodarone drip.  Echocardiogram showed an ejection fraction of 55-60% with no significant valvular disease.  He underwent a diagnostic left heart catheterization on 4/10/2017 which showed severe coronary artery disease including a distal 80% severely calcified left main lesion extending into the ostium of the left circumflex artery.  He was referred for coronary artery bypass grafting and underwent that procedure on 2017 with LIMA to LAD, SVG to OM1, SVG to OM 3.  He do well postoperatively however he had issues with rapid atrial flutter largely responsive to amiodarone at that time.  He underwent cavotricuspid isthmus atrial flutter ablation on 2017 after preoperative AR which ruled out thrombus  formation.  He  developed bradycardia and metoprolol was discontinued as outpatient.    At his follow-up visit in March 2018 he complained of claudication type symptoms involving the right lower extremity with exertion.  He was scheduled to see Dr. Netta Mayorga later that month.  His blood pressure was well-controlled he continued to be out of breath with moderate exertion which had not changed.     GERDA testing which was abnormal on the right moderately reduced normal left in 04/2018.He was prescribed Pletal 50 BID but refused treatment as he was already bruising easily on ASA and Plavix.       Patient presents today for 6 month follow-up.  He continues to have intermittent chest pressure that occurs mainly when he is not doing anything for 2-3 minutes.  He does not have this with exertion such as cutting the lawn or walking 12 blocks which she is able to tolerate without extreme claudication.  He walks through the pain.  He denies palpitation, shortness of breath, lightheadedness, near syncope, syncope, or blood in the urine or stool.  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  He requests to come off aspirin or Plavix to help with this.  She also checks his blood pressure morning at home and has values 104-123/60-80.  He rarely has systolic values 140.  He also brought his home cuff to the office to be checked today which is working appropriately.      Allergies   Allergen Reactions   • Lipitor [Atorvastatin] Myalgia   • Penicillins Hives and Swelling       Past Medical History:   Diagnosis Date   • Anxiety    • Arthritis    • Atrial flutter (CMS/HCC)     Status post cavotricuspid isthmus ablation by Dr. Gustafson on 4/18/17   • Benign essential hypertension    • CAD (coronary artery disease)     3 vessel CABG 4/11/17 by Dr. Cortez: ROSARIO-prox LAD, SVG-OM1, SVG-OM3   • Carotid artery disease (CMS/HCC)     Status post carotid endarterectomy - USG 4/10/17: 50-59% NICHELLE, 1-15% LICA.   "  • Colonic polyp    • DDD (degenerative disc disease), lumbosacral    • H/O bone density study 2013   • H/O complete eye exam 2014   • HLD (hyperlipidemia)    • Hypertension    • Lipid screening 05/31/2013   • Low back pain     physical therapy [facility] rehab 5-12-10   • Screening for prostate cancer 07/07/2015   • Stroke (CMS/HCC)        Past Surgical History:   Procedure Laterality Date   • CARDIAC CATHETERIZATION N/A 4/10/2017    Procedure: Left Heart Cath;  Surgeon: Marjorie Healy MD;  Location: [facility] CATH INVASIVE LOCATION;  Service:    • CARDIAC CATHETERIZATION N/A 4/10/2017    Procedure: Coronary angiography;  Surgeon: Marjorie Healy MD;  Location: [facility] CATH INVASIVE LOCATION;  Service:    • CARDIAC CATHETERIZATION N/A 4/10/2017    Procedure: Left ventriculography;  Surgeon: Marjorie Healy MD;  Location: [facility] CATH INVASIVE LOCATION;  Service:    • CARDIAC ELECTROPHYSIOLOGY PROCEDURE N/A 4/18/2017    Procedure: Ablation atrial flutter;  Surgeon: Jose Antonio Gustafson MD;  Location: [facility] CATH INVASIVE LOCATION;  Service:    • COLONOSCOPY  2010   • CORONARY ARTERY BYPASS GRAFT N/A 4/11/2017    Procedure: AR STERNOTOMY CORONARY ARTERY BYPASS GRAFT TIMES 3 USING LEFT INTERNAL MAMMARY ARTERY AND LEFT GREATER SAPHENOUS VEIN GRAFT PER ENDOSCOPIC VEIN HARVESTING AND PRP ;  Surgeon: Temo Cortez MD;  Location: [facility];  Service:          Family and social history reviewed.     Review of Systems   Cardiovascular: Positive for claudication (\"better\").   Hematologic/Lymphatic: Bruises/bleeds easily.   Musculoskeletal: Positive for joint pain (hands in th evening) and joint swelling.     All other systems were reviewed and are negative          Objective:     Vitals:    09/11/18 1458   BP: 178/92   BP Location: Left arm   Patient Position: Sitting   Pulse: 62   Weight: 80.3 kg (177 lb)   Height: 172.7 cm (68\")     Body mass index is 26.91 kg/m².    PHYSICAL EXAM:  Physical Exam   Constitutional: He is " oriented to person, place, and time. He appears well-developed and well-nourished. No distress.   HENT:   Head: Normocephalic.   Eyes: Conjunctivae are normal.   Neck: Normal range of motion. No JVD present.   Cardiovascular: Normal rate, regular rhythm, normal heart sounds and intact distal pulses.    No murmur heard.  Pulses:       Carotid pulses are 2+ on the right side, and 2+ on the left side.       Radial pulses are 2+ on the right side, and 2+ on the left side.   Pedal pulses not assessed due to patient wearing boots   Pulmonary/Chest: Effort normal and breath sounds normal. No respiratory distress. He has no wheezes. He has no rhonchi. He has no rales. He exhibits no tenderness.   Abdominal: Soft. Bowel sounds are normal. He exhibits no distension.   Musculoskeletal: Normal range of motion. He exhibits no edema.   Neurological: He is alert and oriented to person, place, and time.   Skin: Skin is warm, dry and intact. No rash noted. He is not diaphoretic. No cyanosis.   tattoos   Psychiatric: He has a normal mood and affect. His behavior is normal. Judgment and thought content normal.         ECG 12 Lead  Date/Time: 9/11/2018 3:39 PM  Performed by: MO PAUL  Authorized by: MO PAUL   Comparison: compared with previous ECG from 4/26/2017  Similar to previous ECG  Rhythm: sinus rhythm  Rate: normal  QRS axis: left  Other findings: LVH  Clinical impression: abnormal ECG            Current Outpatient Prescriptions   Medication Sig Dispense Refill   • clopidogrel (PLAVIX) 75 MG tablet Take 1 tablet by mouth Daily. 30 tablet 5   • Hydrocodone-Acetaminophen (VICODIN) 5-300 MG per tablet Take 1 tablet by mouth 2 (Two) Times a Day. 60 tablet 0   • nitroglycerin (NITROSTAT) 0.4 MG SL tablet Place 1 tablet under the tongue Every 5 (Five) Minutes As Needed for Chest Pain. Take no more than 3 doses in 15 minutes. 20 tablet 2   • ramipril (ALTACE) 5 MG capsule Take 1 capsule by mouth Daily. 90 capsule 1   •  rosuvastatin (CRESTOR) 10 MG tablet TAKE ONE TABLET BY MOUTH DAILY 30 tablet 5     No current facility-administered medications for this visit.      Assessment:       Diagnosis Plan   1. S/P CABG (coronary artery bypass graft)     2. PAD (peripheral artery disease) (CMS/[facility])     3. S/P ablation of atrial flutter     4. Coronary artery disease involving coronary bypass graft of native heart without angina pectoris     5. Bilateral carotid artery disease (CMS/[facility])     6. History of left-sided carotid endarterectomy          Orders Placed This Encounter   Procedures   • ECG 12 Lead     This order was created via procedure documentation         Plan:         1.  Coronary artery disease status post coronary artery bypass grafting ×3  In 04/2017. He will stop ASA and continue on Plavix 75 mg daily to help with bruising issue.    Coronary Artery Disease  Assessment  • The patient has no angina  • There is a new diagnosis of stable angina in the past 12 months  • The patient is having symptoms consistent with unstable angina     Plan  • Lifestyle modifications discussed include adhering to a heart healthy diet, medication compliance, regular exercise and regular monitoring of cholesterol and blood pressure    Subjective - Objective  • There is a history of previous coronary artery bypass graft  • Current antiplatelet therapy includes clopidogrel 75 mg  • The patient qualifies for cardiac rehabilitation, and has been referred to cardiac rehab  •   He is currently on aspirin and Plavix.  He will start to take Plavix only due to significant bruising on both antiplatelets.       2.  Atrial flutter status post ablation in 04/2017 remains NSR.    3.  Hypertension elevated in office. Home cuff checked and accurate. Better at home 104-123/60-80    4.  Hyperlipidemia now tolerating Crestor 10 mg.  in 05/2018. Encouraged low carbohydrate diet.    5.  Carotid artery disease status post carotid endarterectomy last US in 04/2017  showing mild LICA and moderate NICHELLE stenosis. Patient reports he will have carotid doppler in 12/2018 per vascular.     6.  History of CVA in approximately 2000 or 2001    7. PAD followed by Dr. Netta Mayorga. Right moderately abnormal GERDA. Normal Left. Patient refused Pletal treatment. Claudication symptoms improved. Tolerating 12 block walks.    8. Sinus bradycardia on beta blocker post flutter ablation. He is not on beta blocker due to this.       Plan of care reviewed with Dr. Wright  Follow up in 6 months with Dr. Wright    Patient was instructed to call the office if new symptoms develop or report to nearest ER if heart attack or stroke is suspected.        It has been a pleasure to participate in this patient's care.      Thank you,  MY Lazaro      **Dane Disclaimer:**  Much of this encounter note is an electronic transcription/translation of spoken language to printed text. The electronic translation of spoken language may permit erroneous, or at times, nonsensical words or phrases to be inadvertently transcribed. Although I have reviewed the note for such errors, some may still exist.

## 2018-09-17 DIAGNOSIS — I25.810 CORONARY ARTERY DISEASE INVOLVING CORONARY BYPASS GRAFT OF NATIVE HEART WITHOUT ANGINA PECTORIS: ICD-10-CM

## 2018-09-17 RX ORDER — NITROGLYCERIN 0.4 MG/1
0.4 TABLET SUBLINGUAL
Qty: 20 TABLET | Refills: 2 | Status: SHIPPED | OUTPATIENT
Start: 2018-09-17 | End: 2020-01-27 | Stop reason: SDUPTHER

## 2018-09-24 ENCOUNTER — TELEPHONE (OUTPATIENT)
Dept: CARDIOLOGY | Facility: CLINIC | Age: 73
End: 2018-09-24

## 2018-09-24 NOTE — TELEPHONE ENCOUNTER
I called pt unable to reach.I left a voice mail that ok to take extra Ramipril.   I asked him to call back if extra dose not help.   Jaime JOLLY

## 2018-09-24 NOTE — TELEPHONE ENCOUNTER
09/24/18  11:09 AM    Yes, please have him take an extra Ramipril.  If it continues to be elevated he can schedule with me.    VINNY

## 2018-09-24 NOTE — TELEPHONE ENCOUNTER
09/24/18   Pt called back I spoke with him gave him instruction on Ramipril. He will call if b/p continues to run high.   Jaime JOLLY

## 2018-09-24 NOTE — TELEPHONE ENCOUNTER
09/24/18  Pt called stating last 2 days his b/p has been running 160's/80's. He wanted to know what to do and can double  his Ramipril  5 mg?   He states he has no changes in life style that would have cause it to go up.   Please advise   Pt's call back # [phone]   Thanks Jaime JOLLY

## 2018-09-26 DIAGNOSIS — M51.37 DDD (DEGENERATIVE DISC DISEASE), LUMBOSACRAL: ICD-10-CM

## 2018-09-26 RX ORDER — HYDROCODONE BITARTRATE AND ACETAMINOPHEN 5; 300 MG/1; MG/1
1 TABLET ORAL 2 TIMES DAILY
Qty: 60 TABLET | Refills: 0 | Status: SHIPPED | OUTPATIENT
Start: 2018-09-26 | End: 2018-10-26 | Stop reason: SDUPTHER

## 2018-10-22 ENCOUNTER — RESULTS ENCOUNTER (OUTPATIENT)
Dept: FAMILY MEDICINE CLINIC | Facility: CLINIC | Age: 73
End: 2018-10-22

## 2018-10-22 DIAGNOSIS — I48.3 TYPICAL ATRIAL FLUTTER (HCC): ICD-10-CM

## 2018-10-22 DIAGNOSIS — I10 BENIGN ESSENTIAL HYPERTENSION: ICD-10-CM

## 2018-10-22 DIAGNOSIS — R39.14 FEELING OF INCOMPLETE BLADDER EMPTYING: ICD-10-CM

## 2018-10-22 DIAGNOSIS — F41.9 ANXIETY: ICD-10-CM

## 2018-10-22 DIAGNOSIS — I63.9 CEREBROVASCULAR ACCIDENT (CVA), UNSPECIFIED MECHANISM (HCC): ICD-10-CM

## 2018-10-22 DIAGNOSIS — M51.37 DDD (DEGENERATIVE DISC DISEASE), LUMBOSACRAL: ICD-10-CM

## 2018-10-26 DIAGNOSIS — M51.37 DDD (DEGENERATIVE DISC DISEASE), LUMBOSACRAL: ICD-10-CM

## 2018-10-26 RX ORDER — HYDROCODONE BITARTRATE AND ACETAMINOPHEN 5; 300 MG/1; MG/1
1 TABLET ORAL 2 TIMES DAILY
Qty: 60 TABLET | Refills: 0 | Status: SHIPPED | OUTPATIENT
Start: 2018-10-26 | End: 2018-11-26 | Stop reason: SDUPTHER

## 2018-11-20 ENCOUNTER — LAB (OUTPATIENT)
Dept: LAB | Facility: HOSPITAL | Age: 73
End: 2018-11-20

## 2018-11-20 DIAGNOSIS — E87.5 SERUM POTASSIUM ELEVATED: Primary | ICD-10-CM

## 2018-11-20 DIAGNOSIS — E87.5 SERUM POTASSIUM ELEVATED: ICD-10-CM

## 2018-11-20 LAB
ALBUMIN SERPL-MCNC: 4.4 G/DL (ref 3.5–5.2)
ALBUMIN/GLOB SERPL: 1.3 G/DL
ALP SERPL-CCNC: 128 U/L (ref 39–117)
ALT SERPL-CCNC: 40 U/L (ref 1–41)
AST SERPL-CCNC: 41 U/L (ref 1–40)
BASOPHILS # BLD AUTO: 0.01 10*3/MM3 (ref 0–0.2)
BASOPHILS NFR BLD AUTO: 0.1 % (ref 0–1.5)
BILIRUB SERPL-MCNC: 0.6 MG/DL (ref 0.1–1.2)
BUN SERPL-MCNC: 17 MG/DL (ref 8–23)
BUN/CREAT SERPL: 16.3 (ref 7–25)
CALCIUM SERPL-MCNC: 10.1 MG/DL (ref 8.6–10.5)
CHLORIDE SERPL-SCNC: 100 MMOL/L (ref 98–107)
CHOLEST SERPL-MCNC: 154 MG/DL (ref 0–200)
CO2 SERPL-SCNC: 30.1 MMOL/L (ref 22–29)
CREAT SERPL-MCNC: 1.04 MG/DL (ref 0.76–1.27)
EOSINOPHIL # BLD AUTO: 0.12 10*3/MM3 (ref 0–0.7)
EOSINOPHIL NFR BLD AUTO: 1.7 % (ref 0.3–6.2)
ERYTHROCYTE [DISTWIDTH] IN BLOOD BY AUTOMATED COUNT: 14.7 % (ref 11.5–14.5)
GLOBULIN SER CALC-MCNC: 3.3 GM/DL
GLUCOSE SERPL-MCNC: 95 MG/DL (ref 65–99)
HCT VFR BLD AUTO: 55.5 % (ref 40.4–52.2)
HDLC SERPL-MCNC: 50 MG/DL (ref 40–60)
HGB BLD-MCNC: 17.3 G/DL (ref 13.7–17.6)
IMM GRANULOCYTES # BLD: 0.09 10*3/MM3 (ref 0–0.03)
IMM GRANULOCYTES NFR BLD: 1.3 % (ref 0–0.5)
LDLC SERPL CALC-MCNC: 92 MG/DL (ref 0–100)
LYMPHOCYTES # BLD AUTO: 1.7 10*3/MM3 (ref 0.9–4.8)
LYMPHOCYTES NFR BLD AUTO: 23.7 % (ref 19.6–45.3)
MCH RBC QN AUTO: 28 PG (ref 27–32.7)
MCHC RBC AUTO-ENTMCNC: 31.2 G/DL (ref 32.6–36.4)
MCV RBC AUTO: 89.8 FL (ref 79.8–96.2)
MONOCYTES # BLD AUTO: 0.79 10*3/MM3 (ref 0.2–1.2)
MONOCYTES NFR BLD AUTO: 11 % (ref 5–12)
NEUTROPHILS # BLD AUTO: 4.56 10*3/MM3 (ref 1.9–8.1)
NEUTROPHILS NFR BLD AUTO: 63.5 % (ref 42.7–76)
NRBC BLD AUTO-RTO: 0 /100 WBC (ref 0–0)
PLATELET # BLD AUTO: 436 10*3/MM3 (ref 140–500)
POTASSIUM BLD-SCNC: 4.7 MMOL/L (ref 3.5–5.2)
POTASSIUM SERPL-SCNC: 6.2 MMOL/L (ref 3.5–5.2)
PROT SERPL-MCNC: 7.7 G/DL (ref 6–8.5)
PSA SERPL-MCNC: 0.72 NG/ML (ref 0–4)
RBC # BLD AUTO: 6.18 10*6/MM3 (ref 4.6–6)
SODIUM SERPL-SCNC: 141 MMOL/L (ref 136–145)
TRIGL SERPL-MCNC: 58 MG/DL (ref 0–150)
TSH SERPL DL<=0.005 MIU/L-ACNC: 3.69 MIU/ML (ref 0.27–4.2)
VLDLC SERPL CALC-MCNC: 11.6 MG/DL (ref 5–40)
WBC # BLD AUTO: 7.18 10*3/MM3 (ref 4.5–10.7)

## 2018-11-20 PROCEDURE — 84132 ASSAY OF SERUM POTASSIUM: CPT

## 2018-11-20 PROCEDURE — 36415 COLL VENOUS BLD VENIPUNCTURE: CPT

## 2018-11-26 ENCOUNTER — OFFICE VISIT (OUTPATIENT)
Dept: FAMILY MEDICINE CLINIC | Facility: CLINIC | Age: 73
End: 2018-11-26

## 2018-11-26 VITALS
SYSTOLIC BLOOD PRESSURE: 126 MMHG | BODY MASS INDEX: 27.13 KG/M2 | HEART RATE: 54 BPM | HEIGHT: 68 IN | OXYGEN SATURATION: 98 % | DIASTOLIC BLOOD PRESSURE: 68 MMHG | WEIGHT: 179 LBS | RESPIRATION RATE: 16 BRPM | TEMPERATURE: 97.8 F

## 2018-11-26 DIAGNOSIS — I10 BENIGN ESSENTIAL HYPERTENSION: ICD-10-CM

## 2018-11-26 DIAGNOSIS — I63.9 CEREBROVASCULAR ACCIDENT (CVA), UNSPECIFIED MECHANISM (HCC): ICD-10-CM

## 2018-11-26 DIAGNOSIS — M51.37 DDD (DEGENERATIVE DISC DISEASE), LUMBOSACRAL: ICD-10-CM

## 2018-11-26 DIAGNOSIS — K21.9 GASTROESOPHAGEAL REFLUX DISEASE, ESOPHAGITIS PRESENCE NOT SPECIFIED: ICD-10-CM

## 2018-11-26 DIAGNOSIS — I25.83 CORONARY ARTERY DISEASE DUE TO LIPID RICH PLAQUE: Primary | ICD-10-CM

## 2018-11-26 DIAGNOSIS — I25.10 CORONARY ARTERY DISEASE DUE TO LIPID RICH PLAQUE: Primary | ICD-10-CM

## 2018-11-26 PROCEDURE — 99214 OFFICE O/P EST MOD 30 MIN: CPT | Performed by: FAMILY MEDICINE

## 2018-11-26 RX ORDER — PANTOPRAZOLE SODIUM 40 MG/1
40 TABLET, DELAYED RELEASE ORAL DAILY
Qty: 90 TABLET | Refills: 0 | Status: SHIPPED | OUTPATIENT
Start: 2018-11-26 | End: 2019-03-29 | Stop reason: SDUPTHER

## 2018-11-26 RX ORDER — CLOPIDOGREL BISULFATE 75 MG/1
75 TABLET ORAL DAILY
Qty: 30 TABLET | Refills: 5 | Status: SHIPPED | OUTPATIENT
Start: 2018-11-26 | End: 2019-03-29 | Stop reason: SDUPTHER

## 2018-11-26 RX ORDER — PANTOPRAZOLE SODIUM 40 MG/1
40 TABLET, DELAYED RELEASE ORAL DAILY
Qty: 90 TABLET | Refills: 1 | Status: SHIPPED | OUTPATIENT
Start: 2018-11-26 | End: 2018-11-26

## 2018-11-26 RX ORDER — HYDROCODONE BITARTRATE AND ACETAMINOPHEN 5; 300 MG/1; MG/1
1 TABLET ORAL 2 TIMES DAILY
Qty: 60 TABLET | Refills: 0 | Status: SHIPPED | OUTPATIENT
Start: 2018-11-26 | End: 2018-12-26 | Stop reason: SDUPTHER

## 2018-11-26 RX ORDER — RAMIPRIL 5 MG/1
5 CAPSULE ORAL DAILY
Qty: 90 CAPSULE | Refills: 1 | Status: SHIPPED | OUTPATIENT
Start: 2018-11-26 | End: 2019-03-29 | Stop reason: SDUPTHER

## 2018-11-26 RX ORDER — ROSUVASTATIN CALCIUM 10 MG/1
10 TABLET, COATED ORAL DAILY
Qty: 30 TABLET | Refills: 5 | Status: SHIPPED | OUTPATIENT
Start: 2018-11-26 | End: 2019-03-29 | Stop reason: SDUPTHER

## 2018-11-26 NOTE — PROGRESS NOTES
Subjective   Chief Complaint:   Chief Complaint   Patient presents with   • Hypertension   • Hyperlipidemia   • DDD         History of Present Illness The patient has read and signed the [facility] Controlled Substance Contract.  I will continue to see patient for regular follow up appointments.  They are well controlled on their medication.  RUTH has been reviewed by me and is updated every 3 months. The patient is aware of the potential for addiction and dependence. 's of epigastric pain and claims the Prilosec is not helping his epigastric pain.  He gets the epigastric pain at supper in the evening.  He's not eating late.  He wants to try Protonix 40 mg 1 a day I will give him a prescription for Protonix 40 mg 1 a day and I wonder come back and see me in a month after he tries 30 days to make sure this is taking care of epigastric pain.  And this is always after supper at night.  I'm going to refill his Vicodin 5 mg which she takes for  de generative disc disease in his low back.  Was other medications.  See him back in a month.  I'm going to give him a prescription for Protonix 40 mg 1 a day.  2 [place] for his cardiac problems.  Clearly sounds like GERD and not cardiac chest pain.  As after he eats at night and he has belching etc.   Eat earlier and do not lie down after supper. I reviewed labs and k+ was normal.            Madhu Santoro 72 y.o. male who presents today for Medical Management of the below listed issues and medication refills.    ICD-10-CM ICD-9-CM   1. Coronary artery disease due to lipid rich plaque I25.10 414.00    I25.83 414.3   2. Cerebrovascular accident (CVA), unspecified mechanism (CMS/HCC) I63.9 434.91   3. Benign essential hypertension I10 401.1   4. DDD (degenerative disc disease), lumbosacral M51.37 722.52   5. Gastroesophageal reflux disease, esophagitis presence not specified K21.9 530.81        he has a problem list of   Patient Active Problem List   Diagnosis   •  "Anxiety   • Arthritis   • Chronic coronary artery disease   • HLD (hyperlipidemia)   • Benign essential hypertension   • DDD (degenerative disc disease), lumbosacral   • Cerebrovascular accident (CMS/HCC)   • Encounter for screening for cardiovascular disorders   • Gastroesophageal reflux disease   • Hyperlipidemia   • Stenosis of carotid artery   • Former smoker   • History of cardiac catheterization   • S/P ablation of atrial flutter   • Typical atrial flutter (CMS/HCC)   • S/P CABG (coronary artery bypass graft)   .  Since the last visit, he has overall felt well.  he has been compliant with   Current Outpatient Medications:   •  clopidogrel (PLAVIX) 75 MG tablet, Take 1 tablet by mouth Daily., Disp: 30 tablet, Rfl: 5  •  Hydrocodone-Acetaminophen (VICODIN) 5-300 MG per tablet, Take 1 tablet by mouth 2 (Two) Times a Day., Disp: 60 tablet, Rfl: 0  •  nitroglycerin (NITROSTAT) 0.4 MG SL tablet, Place 1 tablet under the tongue Every 5 (Five) Minutes As Needed for Chest Pain. Take no more than 3 doses in 15 minutes., Disp: 20 tablet, Rfl: 2  •  ramipril (ALTACE) 5 MG capsule, Take 1 capsule by mouth Daily., Disp: 90 capsule, Rfl: 1  •  rosuvastatin (CRESTOR) 10 MG tablet, Take 1 tablet by mouth Daily., Disp: 30 tablet, Rfl: 5  •  pantoprazole (PROTONIX) 40 MG EC tablet, Take 1 tablet by mouth Daily., Disp: 90 tablet, Rfl: 1.  he denies medication side effects.    All of the chronic condition(s) listed above are stable w/o issues.    /68   Pulse 54   Temp 97.8 °F (36.6 °C)   Resp 16   Ht 172.7 cm (68\")   Wt 81.2 kg (179 lb)   SpO2 98%   BMI 27.22 kg/m²     Results for orders placed or performed in visit on 11/20/18   Potassium   Result Value Ref Range    Potassium 4.7 3.5 - 5.2 mmol/L             The following portions of the patient's history were reviewed and updated as appropriate: allergies, current medications, past family history, past medical history, past social history, past surgical history and " problem list.    Review of Systems   Constitutional: Negative for activity change, appetite change and unexpected weight change.   HENT: Negative for nosebleeds and trouble swallowing.    Eyes: Negative for visual disturbance.   Respiratory: Negative for chest tightness and shortness of breath.    Cardiovascular: Negative for chest pain and palpitations.   Gastrointestinal: Negative for blood in stool, nausea and vomiting.        Gastritis  At night   Genitourinary: Negative for difficulty urinating.   Musculoskeletal: Positive for arthralgias.   Skin: Negative for color change.   Neurological: Negative for dizziness, syncope and speech difficulty.   Psychiatric/Behavioral: Negative for confusion and decreased concentration.       Objective   Physical Exam   Constitutional: He is oriented to person, place, and time. He appears well-developed and well-nourished.   HENT:   Head: Atraumatic.   Eyes: EOM are normal. Pupils are equal, round, and reactive to light.   Neck: Normal range of motion. Neck supple. No thyromegaly present.   Cardiovascular: Normal rate and regular rhythm.   Pulmonary/Chest: Effort normal and breath sounds normal.   Abdominal: Soft. There is no tenderness. There is no guarding.   Musculoskeletal: Normal range of motion.   Lymphadenopathy:     He has no cervical adenopathy.   Neurological: He is alert and oriented to person, place, and time.   Skin: Skin is warm and dry.   Psychiatric: He has a normal mood and affect. His behavior is normal.   Nursing note and vitals reviewed.      Assessment/Plan   Madhu was seen today for hypertension, hyperlipidemia and ddd.    Diagnoses and all orders for this visit:    Coronary artery disease due to lipid rich plaque  -     ramipril (ALTACE) 5 MG capsule; Take 1 capsule by mouth Daily.    Cerebrovascular accident (CVA), unspecified mechanism (CMS/HCC)  -     clopidogrel (PLAVIX) 75 MG tablet; Take 1 tablet by mouth Daily.    Benign essential hypertension  -      clopidogrel (PLAVIX) 75 MG tablet; Take 1 tablet by mouth Daily.    DDD (degenerative disc disease), lumbosacral  -     Hydrocodone-Acetaminophen (VICODIN) 5-300 MG per tablet; Take 1 tablet by mouth 2 (Two) Times a Day.    Gastroesophageal reflux disease, esophagitis presence not specified    Other orders  -     rosuvastatin (CRESTOR) 10 MG tablet; Take 1 tablet by mouth Daily.  -     pantoprazole (PROTONIX) 40 MG EC tablet; Take 1 tablet by mouth Daily.

## 2018-12-26 DIAGNOSIS — M51.37 DDD (DEGENERATIVE DISC DISEASE), LUMBOSACRAL: ICD-10-CM

## 2018-12-28 RX ORDER — HYDROCODONE BITARTRATE AND ACETAMINOPHEN 5; 300 MG/1; MG/1
1 TABLET ORAL 2 TIMES DAILY
Qty: 60 TABLET | Refills: 0 | Status: SHIPPED | OUTPATIENT
Start: 2018-12-28 | End: 2019-01-29 | Stop reason: SDUPTHER

## 2019-01-04 ENCOUNTER — OFFICE VISIT (OUTPATIENT)
Dept: FAMILY MEDICINE CLINIC | Facility: CLINIC | Age: 74
End: 2019-01-04

## 2019-01-04 VITALS
TEMPERATURE: 98 F | SYSTOLIC BLOOD PRESSURE: 143 MMHG | DIASTOLIC BLOOD PRESSURE: 71 MMHG | RESPIRATION RATE: 16 BRPM | OXYGEN SATURATION: 98 % | HEIGHT: 68 IN | HEART RATE: 55 BPM | WEIGHT: 182 LBS | BODY MASS INDEX: 27.58 KG/M2

## 2019-01-04 DIAGNOSIS — I25.10 CHRONIC CORONARY ARTERY DISEASE: ICD-10-CM

## 2019-01-04 DIAGNOSIS — I63.9 CEREBROVASCULAR ACCIDENT (CVA), UNSPECIFIED MECHANISM (HCC): ICD-10-CM

## 2019-01-04 DIAGNOSIS — M51.37 DDD (DEGENERATIVE DISC DISEASE), LUMBOSACRAL: ICD-10-CM

## 2019-01-04 DIAGNOSIS — K21.9 GASTROESOPHAGEAL REFLUX DISEASE, ESOPHAGITIS PRESENCE NOT SPECIFIED: Primary | ICD-10-CM

## 2019-01-04 DIAGNOSIS — I10 BENIGN ESSENTIAL HYPERTENSION: ICD-10-CM

## 2019-01-04 DIAGNOSIS — M19.90 ARTHRITIS: ICD-10-CM

## 2019-01-04 PROCEDURE — 99214 OFFICE O/P EST MOD 30 MIN: CPT | Performed by: FAMILY MEDICINE

## 2019-01-04 RX ORDER — PANTOPRAZOLE SODIUM 40 MG/1
40 TABLET, DELAYED RELEASE ORAL DAILY
Qty: 90 TABLET | Refills: 1 | Status: SHIPPED | OUTPATIENT
Start: 2019-01-04 | End: 2019-03-29 | Stop reason: SDUPTHER

## 2019-01-04 NOTE — PROGRESS NOTES
Subjective   Chief Complaint:   Chief Complaint   Patient presents with   • Coronary Artery Disease   • Hypertension   • Heartburn         History of Present Illness had epigastric pain especially when going to bed after supper and was on sec and that had no effect on his GERD.  So I switched him to Protonix 40 mg 1 a day and his GERD and abdominal pain disappeared and he is comfortable now in his epigastric area.  He still on the Protonix 40 mg daily and he stopped his stopped Prilosec I wanting to see Dr. Lila RAMÍREZ again for consult and make sure that our switch from Prilosec to Protonix 40 mg okay with what he is got.  But apparently his pain is a lot better on the Protonix 40 mg daily.  We'll refer him to Dr. Lila RAMÍREZ to get a second opinion on if it's okay to switch him to Protonix 40 mg.  Also may refer him to a neurologist he wanted to start aspirin instead of Plavix.  To get a consult with Dr. Lila RAMÍREZ and see if this switch from Prilosec to Protonix is okay with him.            Madhu SHANAE Santoro 73 y.o. male who presents today for Medical Management of the below listed issues and medication refills.    ICD-10-CM ICD-9-CM   1. Gastroesophageal reflux disease, esophagitis presence not specified K21.9 530.81   2. Arthritis M19.90 716.90   3. Chronic coronary artery disease I25.10 414.00   4. Benign essential hypertension I10 401.1   5. DDD (degenerative disc disease), lumbosacral M51.37 722.52   6. Cerebrovascular accident (CVA), unspecified mechanism (CMS/HCC) I63.9 434.91        he has a problem list of   Patient Active Problem List   Diagnosis   • Anxiety   • Arthritis   • Chronic coronary artery disease   • HLD (hyperlipidemia)   • Benign essential hypertension   • DDD (degenerative disc disease), lumbosacral   • Cerebrovascular accident (CMS/HCC)   • Encounter for screening for cardiovascular disorders   • Gastroesophageal reflux disease   • Hyperlipidemia   • Stenosis of carotid artery   • Former smoker   •  "History of cardiac catheterization   • S/P ablation of atrial flutter   • Typical atrial flutter (CMS/HCC)   • S/P CABG (coronary artery bypass graft)   .  Since the last visit, he has overall felt well.  he has been compliant with   Current Outpatient Medications:   •  clopidogrel (PLAVIX) 75 MG tablet, Take 1 tablet by mouth Daily., Disp: 30 tablet, Rfl: 5  •  Hydrocodone-Acetaminophen (VICODIN) 5-300 MG per tablet, Take 1 tablet by mouth 2 (Two) Times a Day., Disp: 60 tablet, Rfl: 0  •  nitroglycerin (NITROSTAT) 0.4 MG SL tablet, Place 1 tablet under the tongue Every 5 (Five) Minutes As Needed for Chest Pain. Take no more than 3 doses in 15 minutes., Disp: 20 tablet, Rfl: 2  •  pantoprazole (PROTONIX) 40 MG EC tablet, Take 1 tablet by mouth Daily., Disp: 90 tablet, Rfl: 0  •  ramipril (ALTACE) 5 MG capsule, Take 1 capsule by mouth Daily., Disp: 90 capsule, Rfl: 1  •  rosuvastatin (CRESTOR) 10 MG tablet, Take 1 tablet by mouth Daily., Disp: 30 tablet, Rfl: 5  •  pantoprazole (PROTONIX) 40 MG EC tablet, Take 1 tablet by mouth Daily for 90 days., Disp: 90 tablet, Rfl: 1.  he denies medication side effects.    All of the chronic condition(s) listed above are stable w/o issues.    /71   Pulse 55   Temp 98 °F (36.7 °C)   Resp 16   Ht 172.7 cm (68\")   Wt 82.6 kg (182 lb)   SpO2 98%   BMI 27.67 kg/m²     Results for orders placed or performed in visit on 11/20/18   Potassium   Result Value Ref Range    Potassium 4.7 3.5 - 5.2 mmol/L             The following portions of the patient's history were reviewed and updated as appropriate: allergies, current medications, past family history, past medical history, past social history, past surgical history and problem list.    Review of Systems   Constitutional: Negative for unexpected weight change.   Eyes: Negative for pain and visual disturbance.   Respiratory: Negative for chest tightness.    Cardiovascular: Negative for chest pain and palpitations. "   Gastrointestinal: Positive for nausea. Negative for abdominal pain, blood in stool and vomiting.   Genitourinary: Negative for flank pain and frequency.   Skin: Negative for color change.   Neurological: Negative for syncope, speech difficulty and headaches.   Psychiatric/Behavioral: Negative for behavioral problems, confusion and decreased concentration.       Objective   Physical Exam   Constitutional: He is oriented to person, place, and time. He appears well-developed and well-nourished.   HENT:   Head: Normocephalic.   Mouth/Throat: Oropharynx is clear and moist.   Eyes: EOM are normal. Pupils are equal, round, and reactive to light.   Neck: No JVD present. No tracheal deviation present.   Cardiovascular: Normal rate and regular rhythm.   Pulmonary/Chest: Effort normal and breath sounds normal.   Abdominal: Soft. Bowel sounds are normal. He exhibits no distension.   Musculoskeletal: Normal range of motion.   Neurological: He is alert and oriented to person, place, and time.   Skin: Skin is warm and dry.       Assessment/Plan   Madhu was seen today for coronary artery disease, hypertension and heartburn.    Diagnoses and all orders for this visit:    Gastroesophageal reflux disease, esophagitis presence not specified    Arthritis    Chronic coronary artery disease    Benign essential hypertension    DDD (degenerative disc disease), lumbosacral    Cerebrovascular accident (CVA), unspecified mechanism (CMS/HCC)    Other orders  -     pantoprazole (PROTONIX) 40 MG EC tablet; Take 1 tablet by mouth Daily for 90 days.

## 2019-01-04 NOTE — PATIENT INSTRUCTIONS
Exercise 30 minutes most days of the week  Sleep 6-8 hours each night if possible  Low fat, low cholesterol diet   we discussed prescribed medications and how to take them   make sure you get results of any labs/studies ordered today  Low glycemic index diet  OK ON PROTONIX  40MG  REFER TO NEUROLOGIST ABOUT  PLAVIX/   Refer to gastro  Dr MCNALLY

## 2019-01-18 ENCOUNTER — TELEPHONE (OUTPATIENT)
Dept: CARDIOLOGY | Facility: CLINIC | Age: 74
End: 2019-01-18

## 2019-01-18 NOTE — TELEPHONE ENCOUNTER
01/18/19  Pt's wife called for pt. She states pt needs to have 4 teeth extracted and will need to hold Plavix prior to procedure.   Please advise   Pt's call  Back # [phone]   Thanks Jaime JOLLY

## 2019-01-25 ENCOUNTER — APPOINTMENT (OUTPATIENT)
Dept: GENERAL RADIOLOGY | Facility: HOSPITAL | Age: 74
End: 2019-01-25

## 2019-01-25 ENCOUNTER — HOSPITAL ENCOUNTER (EMERGENCY)
Facility: HOSPITAL | Age: 74
Discharge: HOME | End: 2019-01-25
Attending: EMERGENCY MEDICINE | Admitting: EMERGENCY MEDICINE

## 2019-01-25 VITALS
WEIGHT: 179.6 LBS | DIASTOLIC BLOOD PRESSURE: 71 MMHG | BODY MASS INDEX: 27.22 KG/M2 | OXYGEN SATURATION: 95 % | RESPIRATION RATE: 18 BRPM | TEMPERATURE: 98.5 F | HEART RATE: 80 BPM | SYSTOLIC BLOOD PRESSURE: 170 MMHG | HEIGHT: 68 IN

## 2019-01-25 DIAGNOSIS — R06.00 DYSPNEA, UNSPECIFIED TYPE: Primary | ICD-10-CM

## 2019-01-25 DIAGNOSIS — J06.9 UPPER RESPIRATORY TRACT INFECTION, UNSPECIFIED TYPE: ICD-10-CM

## 2019-01-25 LAB
ALBUMIN SERPL-MCNC: 4 G/DL (ref 3.5–5.2)
ALBUMIN/GLOB SERPL: 1.3 G/DL
ALP SERPL-CCNC: 88 U/L (ref 39–117)
ALT SERPL W P-5'-P-CCNC: 14 U/L (ref 1–41)
ANION GAP SERPL CALCULATED.3IONS-SCNC: 12.1 MMOL/L
AST SERPL-CCNC: 23 U/L (ref 1–40)
BASOPHILS # BLD AUTO: 0.01 10*3/MM3 (ref 0–0.2)
BASOPHILS NFR BLD AUTO: 0.1 % (ref 0–1.5)
BILIRUB SERPL-MCNC: 0.6 MG/DL (ref 0.1–1.2)
BUN BLD-MCNC: 15 MG/DL (ref 8–23)
BUN/CREAT SERPL: 16.3 (ref 7–25)
CALCIUM SPEC-SCNC: 9.4 MG/DL (ref 8.6–10.5)
CHLORIDE SERPL-SCNC: 99 MMOL/L (ref 98–107)
CO2 SERPL-SCNC: 25.9 MMOL/L (ref 22–29)
CREAT BLD-MCNC: 0.92 MG/DL (ref 0.76–1.27)
DEPRECATED RDW RBC AUTO: 51.3 FL (ref 37–54)
EOSINOPHIL # BLD AUTO: 0.01 10*3/MM3 (ref 0–0.7)
EOSINOPHIL NFR BLD AUTO: 0.1 % (ref 0.3–6.2)
ERYTHROCYTE [DISTWIDTH] IN BLOOD BY AUTOMATED COUNT: 15.8 % (ref 11.5–14.5)
FLUAV AG NPH QL: NEGATIVE
FLUBV AG NPH QL IA: NEGATIVE
GFR SERPL CREATININE-BSD FRML MDRD: 80 ML/MIN/1.73
GLOBULIN UR ELPH-MCNC: 3.1 GM/DL
GLUCOSE BLD-MCNC: 155 MG/DL (ref 65–99)
HCT VFR BLD AUTO: 51.9 % (ref 40.4–52.2)
HGB BLD-MCNC: 16.5 G/DL (ref 13.7–17.6)
HOLD SPECIMEN: NORMAL
HOLD SPECIMEN: NORMAL
IMM GRANULOCYTES # BLD AUTO: 0.03 10*3/MM3 (ref 0–0.03)
IMM GRANULOCYTES NFR BLD AUTO: 0.3 % (ref 0–0.5)
LYMPHOCYTES # BLD AUTO: 1.07 10*3/MM3 (ref 0.9–4.8)
LYMPHOCYTES NFR BLD AUTO: 11.8 % (ref 19.6–45.3)
MCH RBC QN AUTO: 28.4 PG (ref 27–32.7)
MCHC RBC AUTO-ENTMCNC: 31.8 G/DL (ref 32.6–36.4)
MCV RBC AUTO: 89.2 FL (ref 79.8–96.2)
MONOCYTES # BLD AUTO: 1.35 10*3/MM3 (ref 0.2–1.2)
MONOCYTES NFR BLD AUTO: 14.9 % (ref 5–12)
NEUTROPHILS # BLD AUTO: 6.59 10*3/MM3 (ref 1.9–8.1)
NEUTROPHILS NFR BLD AUTO: 72.8 % (ref 42.7–76)
NT-PROBNP SERPL-MCNC: 182.8 PG/ML (ref 0–900)
PLATELET # BLD AUTO: 342 10*3/MM3 (ref 140–500)
PMV BLD AUTO: 9.8 FL (ref 6–12)
POTASSIUM BLD-SCNC: 5 MMOL/L (ref 3.5–5.2)
PROT SERPL-MCNC: 7.1 G/DL (ref 6–8.5)
RBC # BLD AUTO: 5.82 10*6/MM3 (ref 4.6–6)
SODIUM BLD-SCNC: 137 MMOL/L (ref 136–145)
TROPONIN T SERPL-MCNC: <0.01 NG/ML (ref 0–0.03)
WBC NRBC COR # BLD: 9.06 10*3/MM3 (ref 4.5–10.7)
WHOLE BLOOD HOLD SPECIMEN: NORMAL
WHOLE BLOOD HOLD SPECIMEN: NORMAL

## 2019-01-25 PROCEDURE — 99285 EMERGENCY DEPT VISIT HI MDM: CPT

## 2019-01-25 PROCEDURE — 94640 AIRWAY INHALATION TREATMENT: CPT

## 2019-01-25 PROCEDURE — 80053 COMPREHEN METABOLIC PANEL: CPT | Performed by: EMERGENCY MEDICINE

## 2019-01-25 PROCEDURE — 87804 INFLUENZA ASSAY W/OPTIC: CPT | Performed by: EMERGENCY MEDICINE

## 2019-01-25 PROCEDURE — 84484 ASSAY OF TROPONIN QUANT: CPT | Performed by: EMERGENCY MEDICINE

## 2019-01-25 PROCEDURE — 71046 X-RAY EXAM CHEST 2 VIEWS: CPT

## 2019-01-25 PROCEDURE — 93010 ELECTROCARDIOGRAM REPORT: CPT | Performed by: INTERNAL MEDICINE

## 2019-01-25 PROCEDURE — 83880 ASSAY OF NATRIURETIC PEPTIDE: CPT | Performed by: EMERGENCY MEDICINE

## 2019-01-25 PROCEDURE — 85025 COMPLETE CBC W/AUTO DIFF WBC: CPT | Performed by: EMERGENCY MEDICINE

## 2019-01-25 PROCEDURE — 93005 ELECTROCARDIOGRAM TRACING: CPT

## 2019-01-25 PROCEDURE — 94799 UNLISTED PULMONARY SVC/PX: CPT

## 2019-01-25 RX ORDER — IPRATROPIUM BROMIDE AND ALBUTEROL SULFATE 2.5; .5 MG/3ML; MG/3ML
3 SOLUTION RESPIRATORY (INHALATION) ONCE
Status: COMPLETED | OUTPATIENT
Start: 2019-01-25 | End: 2019-01-25

## 2019-01-25 RX ORDER — ALBUTEROL SULFATE 90 UG/1
2 AEROSOL, METERED RESPIRATORY (INHALATION) EVERY 4 HOURS PRN
Qty: 1 INHALER | Refills: 0 | Status: SHIPPED | OUTPATIENT
Start: 2019-01-25 | End: 2021-03-03

## 2019-01-25 RX ORDER — SODIUM CHLORIDE 0.9 % (FLUSH) 0.9 %
10 SYRINGE (ML) INJECTION AS NEEDED
Status: DISCONTINUED | OUTPATIENT
Start: 2019-01-25 | End: 2019-01-25 | Stop reason: HOSPADM

## 2019-01-25 RX ADMIN — IPRATROPIUM BROMIDE AND ALBUTEROL SULFATE 3 ML: 2.5; .5 SOLUTION RESPIRATORY (INHALATION) at 05:14

## 2019-01-25 NOTE — ED TRIAGE NOTES
Pt brought in from home by [facility] who were called in for c/o SOA and chest pain. At arrival Pt denied chest pain but confirmed he was having difficulty breathing.  Pt denies wearing o2 at home.    EMS placed Pt on 2L NC and Pt reported improvement.  + cardiac Hx with ablations bypass in past

## 2019-01-25 NOTE — ED PROVIDER NOTES
" EMERGENCY DEPARTMENT ENCOUNTER    CHIEF COMPLAINT  Chief Complaint: SOA  History given by: Patient  History limited by:   Room Number: 11/11  PMD: Damian Chen MD      HPI:  Pt is a 73 y.o. male who presents complaining of SOA that began tonight that he states as \"trouble getting a deep breath\". He states that he is also having some pleuritic CP. He also reports some blurred vision Pt denies any hx of pulmonary disease. Pt is former smoker. He denies any nausea or vomiting, fever, chills, or recent sick contact.    Duration: PTA  Onset: sudden  Timing: constant  Quality: \"trouble getting deep breath\"  Intensity/Severity: moderate  Progression: unchanged  Associated Symptoms: CP  Aggravating Factors: none  Alleviating Factors: none  Previous Episodes: none  Treatment before arrival: none    PAST MEDICAL HISTORY  Active Ambulatory Problems     Diagnosis Date Noted   • Anxiety    • Arthritis    • Chronic coronary artery disease    • HLD (hyperlipidemia)    • Benign essential hypertension    • DDD (degenerative disc disease), lumbosacral    • Cerebrovascular accident (CMS/HCC)    • Encounter for screening for cardiovascular disorders 11/20/2012   • Gastroesophageal reflux disease 04/04/2016   • Hyperlipidemia 04/04/2016   • Stenosis of carotid artery 04/26/2017   • Former smoker 04/26/2017   • History of cardiac catheterization 12/16/2011   • S/P ablation of atrial flutter 04/26/2017   • Typical atrial flutter (CMS/HCC) 04/26/2017   • S/P CABG (coronary artery bypass graft) 04/26/2017     Resolved Ambulatory Problems     Diagnosis Date Noted   • No Resolved Ambulatory Problems     Past Medical History:   Diagnosis Date   • Anxiety    • Arthritis    • Atrial flutter (CMS/HCC)    • Benign essential hypertension    • CAD (coronary artery disease)    • Carotid artery disease (CMS/HCC)    • Colonic polyp    • DDD (degenerative disc disease), lumbosacral    • H/O bone density study 2013   • H/O complete eye exam 2014   • " HLD (hyperlipidemia)    • Hypertension    • Lipid screening 05/31/2013   • Low back pain    • Screening for prostate cancer 07/07/2015   • Stroke (CMS/HCC)        PAST SURGICAL HISTORY  Past Surgical History:   Procedure Laterality Date   • CARDIAC CATHETERIZATION N/A 4/10/2017    Procedure: Left Heart Cath;  Surgeon: Marjorie Healy MD;  Location: [facility] CATH INVASIVE LOCATION;  Service:    • CARDIAC CATHETERIZATION N/A 4/10/2017    Procedure: Coronary angiography;  Surgeon: Marjorie Healy MD;  Location: [facility] CATH INVASIVE LOCATION;  Service:    • CARDIAC CATHETERIZATION N/A 4/10/2017    Procedure: Left ventriculography;  Surgeon: Marjorei Healy MD;  Location: [facility] CATH INVASIVE LOCATION;  Service:    • CARDIAC ELECTROPHYSIOLOGY PROCEDURE N/A 4/18/2017    Procedure: Ablation atrial flutter;  Surgeon: Jose Antonio Gustafson MD;  Location: [facility] CATH INVASIVE LOCATION;  Service:    • COLONOSCOPY  2010   • CORONARY ARTERY BYPASS GRAFT N/A 4/11/2017    Procedure: AR STERNOTOMY CORONARY ARTERY BYPASS GRAFT TIMES 3 USING LEFT INTERNAL MAMMARY ARTERY AND LEFT GREATER SAPHENOUS VEIN GRAFT PER ENDOSCOPIC VEIN HARVESTING AND PRP ;  Surgeon: Temo Cortez MD;  Location: [facility] OR;  Service:        FAMILY HISTORY  Family History   Problem Relation Age of Onset   • Heart disease Mother    • Heart attack Mother    • Stroke Mother    • Heart disease Father    • Heart attack Father    • Stroke Father    • Arthritis Other    • Diabetes Other    • Hypertension Other    • Kidney disease Other         stones   • Hypertension Sister    • Heart attack Brother    • Heart disease Brother    • No Known Problems Maternal Grandmother    • No Known Problems Maternal Grandfather    • No Known Problems Paternal Grandmother    • No Known Problems Paternal Grandfather    • No Known Problems Brother    • Heart disease Brother    • Heart attack Brother    • Diabetes Brother        SOCIAL HISTORY  Social History     Socioeconomic History    • Marital status:      Spouse name: Not on file   • Number of children: Not on file   • Years of education: Not on file   • Highest education level: Not on file   Social Needs   • Financial resource strain: Not on file   • Food insecurity - worry: Not on file   • Food insecurity - inability: Not on file   • Transportation needs - medical: Not on file   • Transportation needs - non-medical: Not on file   Occupational History   • Not on file   Tobacco Use   • Smoking status: Former Smoker   • Smokeless tobacco: Never Used   • Tobacco comment: CAFFEINE USE   Substance and Sexual Activity   • Alcohol use: No   • Drug use: No   • Sexual activity: Yes     Partners: Female   Other Topics Concern   • Not on file   Social History Narrative   • Not on file       ALLERGIES  Lipitor [atorvastatin] and Penicillins    REVIEW OF SYSTEMS  Review of Systems   Constitutional: Negative for activity change, appetite change and fever.   HENT: Negative for congestion and sore throat.    Eyes: Negative.    Respiratory: Positive for shortness of breath. Negative for cough.    Cardiovascular: Positive for chest pain. Negative for leg swelling.   Gastrointestinal: Negative for abdominal pain, diarrhea and vomiting.   Endocrine: Negative.    Genitourinary: Negative for decreased urine volume and dysuria.   Musculoskeletal: Negative for neck pain.   Skin: Negative for rash and wound.   Allergic/Immunologic: Negative.    Neurological: Negative for weakness, numbness and headaches.   Hematological: Negative.    Psychiatric/Behavioral: Negative.    All other systems reviewed and are negative.      PHYSICAL EXAM  ED Triage Vitals   Temp Heart Rate Resp BP SpO2   01/25/19 0342 01/25/19 0341 01/25/19 0341 01/25/19 0341 01/25/19 0341   98.5 °F (36.9 °C) 83 16 175/84 97 %      Temp src Heart Rate Source Patient Position BP Location FiO2 (%)   01/25/19 0342 -- -- -- --   Tympanic           Physical Exam   Constitutional: He is oriented to  person, place, and time. No distress.   HENT:   Head: Normocephalic and atraumatic.   Eyes: EOM are normal. Pupils are equal, round, and reactive to light.   Neck: Normal range of motion. Neck supple.   Cardiovascular: Normal rate, regular rhythm and normal heart sounds.   Pulmonary/Chest: Effort normal. No respiratory distress. He has rhonchi.   Abdominal: Soft. There is no tenderness. There is no rebound and no guarding.   Musculoskeletal: Normal range of motion. He exhibits no edema.   Neurological: He is alert and oriented to person, place, and time. He has normal sensation and normal strength.   Skin: Skin is warm and dry.   Psychiatric: Mood and affect normal.   Nursing note and vitals reviewed.      LAB RESULTS  Lab Results (last 24 hours)     Procedure Component Value Units Date/Time    CBC & Differential [[id number]] Collected:  01/25/19 0403    Specimen:  Blood Updated:  01/25/19 0414    Narrative:       The following orders were created for panel order CBC & Differential.  Procedure                               Abnormality         Status                     ---------                               -----------         ------                     CBC Auto Differential[[id number]]        Abnormal            Final result                 Please view results for these tests on the individual orders.    Comprehensive Metabolic Panel [[id number]]  (Abnormal) Collected:  01/25/19 0403    Specimen:  Blood Updated:  01/25/19 0437     Glucose 155 mg/dL      BUN 15 mg/dL      Creatinine 0.92 mg/dL      Sodium 137 mmol/L      Potassium 5.0 mmol/L      Chloride 99 mmol/L      CO2 25.9 mmol/L      Calcium 9.4 mg/dL      Total Protein 7.1 g/dL      Albumin 4.00 g/dL      ALT (SGPT) 14 U/L      AST (SGOT) 23 U/L      Alkaline Phosphatase 88 U/L      Total Bilirubin 0.6 mg/dL      eGFR Non African Amer 80 mL/min/1.73      Globulin 3.1 gm/dL      A/G Ratio 1.3 g/dL      BUN/Creatinine Ratio 16.3     Anion Gap 12.1 mmol/L      Narrative:       The MDRD GFR formula is only valid for adults with stable renal function between ages 18 and 70.    BNP [[id number]]  (Normal) Collected:  01/25/19 0403    Specimen:  Blood Updated:  01/25/19 0440     proBNP 182.8 pg/mL     Narrative:       Among patients with dyspnea, NT-proBNP is highly sensitive for the detection of acute congestive heart failure. In addition NT-proBNP of <300 pg/ml effectively rules out acute congestive heart failure with 99% negative predictive value.    Troponin [[id number]]  (Normal) Collected:  01/25/19 0403    Specimen:  Blood Updated:  01/25/19 0440     Troponin T <0.010 ng/mL     Narrative:       Troponin T Reference Range:  <= 0.03 ng/mL-   Negative for AMI  >0.03 ng/mL-     Abnormal for myocardial necrosis.  Clinicians would have to utilize clinical acumen, EKG, Troponin and serial changes to determine if it is an Acute Myocardial Infarction or myocardial injury due to an underlying chronic condition.     CBC Auto Differential [[id number]]  (Abnormal) Collected:  01/25/19 0403    Specimen:  Blood Updated:  01/25/19 0414     WBC 9.06 10*3/mm3      RBC 5.82 10*6/mm3      Hemoglobin 16.5 g/dL      Hematocrit 51.9 %      MCV 89.2 fL      MCH 28.4 pg      MCHC 31.8 g/dL      RDW 15.8 %      RDW-SD 51.3 fl      MPV 9.8 fL      Platelets 342 10*3/mm3      Neutrophil % 72.8 %      Lymphocyte % 11.8 %      Monocyte % 14.9 %      Eosinophil % 0.1 %      Basophil % 0.1 %      Immature Grans % 0.3 %      Neutrophils, Absolute 6.59 10*3/mm3      Lymphocytes, Absolute 1.07 10*3/mm3      Monocytes, Absolute 1.35 10*3/mm3      Eosinophils, Absolute 0.01 10*3/mm3      Basophils, Absolute 0.01 10*3/mm3      Immature Grans, Absolute 0.03 10*3/mm3     Influenza Antigen, Rapid - Swab, Nasopharynx [[id number]]  (Normal) Collected:  01/25/19 0526    Specimen:  Swab from Nasopharynx Updated:  01/25/19 0608     Influenza A Ag, EIA Negative     Influenza B Ag, EIA Negative          I ordered the  above labs and reviewed the results    RADIOLOGY  XR Chest 2 View   Final Result   No acute findings.       This report was finalized on 1/25/2019 4:36 AM by Dr. Dawna Addison M.D.               I ordered the above noted radiological studies. Interpreted by radiologist. Reviewed by me in PACS.       PROCEDURES  Procedures  EKG          EKG time: 0419  Rhythm/Rate: NSR, 63BPM  P waves and VT: nml  QRS, axis: LAD   ST and T waves: nonspecific ST/T wave changes     Interpreted Contemporaneously by me, independently viewed  unchanged compared to prior 9/11/18      PROGRESS AND CONSULTS     3:58 AM  Ordered blood work, EKG, and CXR. Duo-neb breathing tx ordered.   5:53 AM  Rechecked with pt. He reports that sxs have improved with breathing tx. Discussed the pt's lab and imaging results.  6:13 AM  Rechecked with pt. Informed pt of his negative flu swab and plan to discharge. Pt understands and agrees with plan. All concerns were addressed.      MEDICAL DECISION MAKING  Results were reviewed/discussed with the patient and they were also made aware of online access. Pt also made aware that some labs, such as cultures, will not be resulted during ER visit and follow up with PMD is necessary.     MDM  Number of Diagnoses or Management Options     Amount and/or Complexity of Data Reviewed  Clinical lab tests: ordered and reviewed (unremarkable)  Tests in the radiology section of CPT®: ordered and reviewed (negative)  Tests in the medicine section of CPT®: reviewed and ordered (EKG - See EKG procedure note  )           DIAGNOSIS  Final diagnoses:   Dyspnea, unspecified type   Upper respiratory tract infection, unspecified type       DISPOSITION  DISCHARGE    Patient discharged in stable condition.    Reviewed implications of results, diagnosis, meds, responsibility to follow up, warning signs and symptoms of possible worsening, potential complications and reasons to return to ER.    Patient/Family voiced understanding of  above instructions.    Discussed plan for discharge, as there is no emergent indication for admission. Patient referred to primary care provider for BP management due to today's BP. Pt/family is agreeable and understands need for follow up and repeat testing.  Pt is aware that discharge does not mean that nothing is wrong but it indicates no emergency is present that requires admission and they must continue care with follow-up as given below or physician of their choice.     FOLLOW-UP  Damian Chen MD  [id number] [id number]  [phone]    Schedule an appointment as soon as possible for a visit            Medication List      New Prescriptions    albuterol sulfate  (90 Base) MCG/ACT inhaler  Commonly known as:  PROVENTIL HFA;VENTOLIN HFA;PROAIR HFA  Inhale 2 puffs Every 4 (Four) Hours As Needed for Wheezing.              Latest Documented Vital Signs:  As of 6:13 AM  BP- 170/71 HR- 80 Temp- 98.5 °F (36.9 °C) (Tympanic) O2 sat- 95%    --  Documentation assistance provided by noé Rosario for Dr. Avelar.  Information recorded by the scribe was done at my direction and has been verified and validated by me.     Srinath Rosario  01/25/19 0614       Chris Avelar MD  01/25/19 8900

## 2019-01-27 ENCOUNTER — HOSPITAL ENCOUNTER (EMERGENCY)
Facility: HOSPITAL | Age: 74
Discharge: HOME | End: 2019-01-28
Attending: EMERGENCY MEDICINE | Admitting: EMERGENCY MEDICINE

## 2019-01-27 DIAGNOSIS — R10.11 ACUTE BILATERAL UPPER ABDOMINAL PAIN: Primary | ICD-10-CM

## 2019-01-27 DIAGNOSIS — R10.12 ACUTE BILATERAL UPPER ABDOMINAL PAIN: Primary | ICD-10-CM

## 2019-01-27 LAB
ALBUMIN SERPL-MCNC: 4.2 G/DL (ref 3.5–5.2)
ALBUMIN/GLOB SERPL: 1.2 G/DL
ALP SERPL-CCNC: 99 U/L (ref 39–117)
ALT SERPL W P-5'-P-CCNC: 16 U/L (ref 1–41)
ANION GAP SERPL CALCULATED.3IONS-SCNC: 16.9 MMOL/L
AST SERPL-CCNC: 32 U/L (ref 1–40)
BASOPHILS # BLD AUTO: 0.01 10*3/MM3 (ref 0–0.2)
BASOPHILS NFR BLD AUTO: 0.1 % (ref 0–1.5)
BILIRUB SERPL-MCNC: 1 MG/DL (ref 0.1–1.2)
BUN BLD-MCNC: 21 MG/DL (ref 8–23)
BUN/CREAT SERPL: 19.3 (ref 7–25)
CALCIUM SPEC-SCNC: 9.9 MG/DL (ref 8.6–10.5)
CHLORIDE SERPL-SCNC: 100 MMOL/L (ref 98–107)
CO2 SERPL-SCNC: 24.1 MMOL/L (ref 22–29)
CREAT BLD-MCNC: 1.09 MG/DL (ref 0.76–1.27)
DEPRECATED RDW RBC AUTO: 49.9 FL (ref 37–54)
EOSINOPHIL # BLD AUTO: 0.07 10*3/MM3 (ref 0–0.7)
EOSINOPHIL NFR BLD AUTO: 1 % (ref 0.3–6.2)
ERYTHROCYTE [DISTWIDTH] IN BLOOD BY AUTOMATED COUNT: 15.9 % (ref 11.5–14.5)
GFR SERPL CREATININE-BSD FRML MDRD: 66 ML/MIN/1.73
GLOBULIN UR ELPH-MCNC: 3.5 GM/DL
GLUCOSE BLD-MCNC: 111 MG/DL (ref 65–99)
HCT VFR BLD AUTO: 52 % (ref 40.4–52.2)
HGB BLD-MCNC: 17.4 G/DL (ref 13.7–17.6)
IMM GRANULOCYTES # BLD AUTO: 0.08 10*3/MM3 (ref 0–0.03)
IMM GRANULOCYTES NFR BLD AUTO: 1.1 % (ref 0–0.5)
LIPASE SERPL-CCNC: 25 U/L (ref 13–60)
LYMPHOCYTES # BLD AUTO: 1.71 10*3/MM3 (ref 0.9–4.8)
LYMPHOCYTES NFR BLD AUTO: 24.2 % (ref 19.6–45.3)
MCH RBC QN AUTO: 28.7 PG (ref 27–32.7)
MCHC RBC AUTO-ENTMCNC: 33.5 G/DL (ref 32.6–36.4)
MCV RBC AUTO: 85.8 FL (ref 79.8–96.2)
MONOCYTES # BLD AUTO: 0.97 10*3/MM3 (ref 0.2–1.2)
MONOCYTES NFR BLD AUTO: 13.7 % (ref 5–12)
NEUTROPHILS # BLD AUTO: 4.31 10*3/MM3 (ref 1.9–8.1)
NEUTROPHILS NFR BLD AUTO: 61 % (ref 42.7–76)
PLATELET # BLD AUTO: 444 10*3/MM3 (ref 140–500)
PMV BLD AUTO: 10.2 FL (ref 6–12)
POTASSIUM BLD-SCNC: 4 MMOL/L (ref 3.5–5.2)
PROT SERPL-MCNC: 7.7 G/DL (ref 6–8.5)
RBC # BLD AUTO: 6.06 10*6/MM3 (ref 4.6–6)
SODIUM BLD-SCNC: 141 MMOL/L (ref 136–145)
TROPONIN T SERPL-MCNC: <0.01 NG/ML (ref 0–0.03)
WBC NRBC COR # BLD: 7.07 10*3/MM3 (ref 4.5–10.7)

## 2019-01-27 PROCEDURE — 80053 COMPREHEN METABOLIC PANEL: CPT | Performed by: EMERGENCY MEDICINE

## 2019-01-27 PROCEDURE — 96374 THER/PROPH/DIAG INJ IV PUSH: CPT

## 2019-01-27 PROCEDURE — 84484 ASSAY OF TROPONIN QUANT: CPT | Performed by: EMERGENCY MEDICINE

## 2019-01-27 PROCEDURE — 25010000002 MORPHINE (PF) 10 MG/ML SOLUTION: Performed by: EMERGENCY MEDICINE

## 2019-01-27 PROCEDURE — 96375 TX/PRO/DX INJ NEW DRUG ADDON: CPT

## 2019-01-27 PROCEDURE — 93005 ELECTROCARDIOGRAM TRACING: CPT

## 2019-01-27 PROCEDURE — 99285 EMERGENCY DEPT VISIT HI MDM: CPT

## 2019-01-27 PROCEDURE — 96361 HYDRATE IV INFUSION ADD-ON: CPT

## 2019-01-27 PROCEDURE — 83690 ASSAY OF LIPASE: CPT | Performed by: EMERGENCY MEDICINE

## 2019-01-27 PROCEDURE — 85025 COMPLETE CBC W/AUTO DIFF WBC: CPT | Performed by: EMERGENCY MEDICINE

## 2019-01-27 PROCEDURE — 25010000002 ONDANSETRON PER 1 MG: Performed by: EMERGENCY MEDICINE

## 2019-01-27 PROCEDURE — 93005 ELECTROCARDIOGRAM TRACING: CPT | Performed by: EMERGENCY MEDICINE

## 2019-01-27 PROCEDURE — 93010 ELECTROCARDIOGRAM REPORT: CPT | Performed by: INTERNAL MEDICINE

## 2019-01-27 RX ORDER — SODIUM CHLORIDE 0.9 % (FLUSH) 0.9 %
10 SYRINGE (ML) INJECTION AS NEEDED
Status: DISCONTINUED | OUTPATIENT
Start: 2019-01-27 | End: 2019-01-28 | Stop reason: HOSPADM

## 2019-01-27 RX ORDER — MORPHINE SULFATE 2 MG/ML
4 INJECTION, SOLUTION INTRAMUSCULAR; INTRAVENOUS ONCE
Status: COMPLETED | OUTPATIENT
Start: 2019-01-27 | End: 2019-01-27

## 2019-01-27 RX ORDER — ONDANSETRON 2 MG/ML
4 INJECTION INTRAMUSCULAR; INTRAVENOUS ONCE
Status: COMPLETED | OUTPATIENT
Start: 2019-01-27 | End: 2019-01-27

## 2019-01-27 RX ADMIN — SODIUM CHLORIDE 1000 ML: 9 INJECTION, SOLUTION INTRAVENOUS at 22:38

## 2019-01-27 RX ADMIN — MORPHINE SULFATE 4 MG: 10 INJECTION INTRAVENOUS at 22:46

## 2019-01-27 RX ADMIN — ONDANSETRON 4 MG: 2 INJECTION, SOLUTION INTRAMUSCULAR; INTRAVENOUS at 22:45

## 2019-01-28 VITALS
SYSTOLIC BLOOD PRESSURE: 152 MMHG | RESPIRATION RATE: 16 BRPM | TEMPERATURE: 95.8 F | HEIGHT: 68 IN | BODY MASS INDEX: 26.55 KG/M2 | WEIGHT: 175.2 LBS | DIASTOLIC BLOOD PRESSURE: 73 MMHG | OXYGEN SATURATION: 95 % | HEART RATE: 60 BPM

## 2019-01-28 RX ORDER — ONDANSETRON 4 MG/1
4 TABLET, ORALLY DISINTEGRATING ORAL EVERY 6 HOURS PRN
Qty: 10 TABLET | Refills: 0 | Status: SHIPPED | OUTPATIENT
Start: 2019-01-28 | End: 2019-03-29

## 2019-01-28 RX ORDER — DICYCLOMINE HCL 20 MG
20 TABLET ORAL EVERY 6 HOURS PRN
Qty: 20 TABLET | Refills: 0 | Status: SHIPPED | OUTPATIENT
Start: 2019-01-28 | End: 2019-03-29

## 2019-01-29 DIAGNOSIS — M51.37 DDD (DEGENERATIVE DISC DISEASE), LUMBOSACRAL: ICD-10-CM

## 2019-01-29 RX ORDER — RANITIDINE 150 MG/1
CAPSULE ORAL
Qty: 180 CAPSULE | Refills: 0 | Status: SHIPPED | OUTPATIENT
Start: 2019-01-29 | End: 2019-03-29

## 2019-01-29 RX ORDER — HYDROCODONE BITARTRATE AND ACETAMINOPHEN 5; 300 MG/1; MG/1
1 TABLET ORAL 2 TIMES DAILY
Qty: 60 TABLET | Refills: 0 | Status: SHIPPED | OUTPATIENT
Start: 2019-01-29 | End: 2019-03-01 | Stop reason: SDUPTHER

## 2019-02-12 ENCOUNTER — OFFICE VISIT (OUTPATIENT)
Dept: GASTROENTEROLOGY | Facility: CLINIC | Age: 74
End: 2019-02-12

## 2019-02-12 VITALS
BODY MASS INDEX: 27.19 KG/M2 | HEIGHT: 68 IN | TEMPERATURE: 97.8 F | DIASTOLIC BLOOD PRESSURE: 80 MMHG | SYSTOLIC BLOOD PRESSURE: 148 MMHG | WEIGHT: 179.4 LBS

## 2019-02-12 DIAGNOSIS — D12.2 ADENOMATOUS POLYP OF ASCENDING COLON: Primary | ICD-10-CM

## 2019-02-12 DIAGNOSIS — R14.0 ABDOMINAL BLOATING: ICD-10-CM

## 2019-02-12 PROCEDURE — 99204 OFFICE O/P NEW MOD 45 MIN: CPT | Performed by: INTERNAL MEDICINE

## 2019-02-12 NOTE — PROGRESS NOTES
Chief Complaint   Patient presents with   • Abdominal Pain       Madhu Santoro is a 73 y.o. male who presents with chronic abdominal pain, GERD, colon polyps, Mandel's esophagus    Abdominal pain, epigastric region radiating into the lower quadrants improved with Bentyl and PPI.  EGD 3 years ago with Mandel's esophagus, colonoscopy with polyps removed.  Due for EGD and colonoscopy this year.  No weight loss, rectal bleeding or other alarm symptoms.        Past Medical History:   Diagnosis Date   • Anxiety    • Arthritis    • Atrial flutter (CMS/HCC)     Status post cavotricuspid isthmus ablation by Dr. Gustafson on 4/18/17   • Benign essential hypertension    • CAD (coronary artery disease)     3 vessel CABG 4/11/17 by Dr. Cortez: ROSARIO-prox LAD, SVG-OM1, SVG-OM3   • Carotid artery disease (CMS/HCC)     Status post carotid endarterectomy - USG 4/10/17: 50-59% NICHELLE, 1-15% LICA.    • Colonic polyp    • DDD (degenerative disc disease), lumbosacral    • H/O bone density study 2013   • H/O complete eye exam 2014   • HLD (hyperlipidemia)    • Hypertension    • Lipid screening 05/31/2013   • Low back pain     [facility]   • Screening for prostate cancer 07/07/2015   • Stroke (CMS/HCC)        Past Surgical History:   Procedure Laterality Date   • CARDIAC CATHETERIZATION N/A 4/10/2017    Procedure: Left Heart Cath;  Surgeon: Marjorie Healy MD;  Location: [facility] CATH INVASIVE LOCATION;  Service:    • CARDIAC CATHETERIZATION N/A 4/10/2017    Procedure: Coronary angiography;  Surgeon: Marjorie Healy MD;  [facility];  Service:    • CARDIAC CATHETERIZATION N/A 4/10/2017    Procedure: Left ventriculography;  Surgeon: Marjorie Healy MD;  [facility];  Service:    • CARDIAC ELECTROPHYSIOLOGY PROCEDURE N/A 4/18/2017    Procedure: Ablation atrial flutter;  Surgeon: Jose Antonio Gustafson MD;  [facility]ASIVE [facility];  Service:    •  COLONOSCOPY  01/06/2015    Diverticulosis, one TA   • CORONARY ARTERY BYPASS GRAFT N/A 4/11/2017    Procedure: AR STERNOTOMY CORONARY ARTERY BYPASS GRAFT TIMES 3 USING LEFT INTERNAL MAMMARY ARTERY AND LEFT GREATER SAPHENOUS VEIN GRAFT PER ENDOSCOPIC VEIN HARVESTING AND PRP ;  Surgeon: Temo Cortez MD;  Location: [facility];  Service:    • ENDOSCOPY  01/06/2015    HH, Mandel's esophagus         Current Outpatient Medications:   •  albuterol sulfate  (90 Base) MCG/ACT inhaler, Inhale 2 puffs Every 4 (Four) Hours As Needed for Wheezing., Disp: 1 inhaler, Rfl: 0  •  clopidogrel (PLAVIX) 75 MG tablet, Take 1 tablet by mouth Daily., Disp: 30 tablet, Rfl: 5  •  dicyclomine (BENTYL) 20 MG tablet, Take 1 tablet by mouth Every 6 (Six) Hours As Needed (cramping)., Disp: 20 tablet, Rfl: 0  •  Hydrocodone-Acetaminophen (VICODIN) 5-300 MG per tablet, Take 1 tablet by mouth 2 (Two) Times a Day., Disp: 60 tablet, Rfl: 0  •  nitroglycerin (NITROSTAT) 0.4 MG SL tablet, Place 1 tablet under the tongue Every 5 (Five) Minutes As Needed for Chest Pain. Take no more than 3 doses in 15 minutes., Disp: 20 tablet, Rfl: 2  •  ondansetron ODT (ZOFRAN-ODT) 4 MG disintegrating tablet, Take 1 tablet by mouth Every 6 (Six) Hours As Needed for Nausea., Disp: 10 tablet, Rfl: 0  •  pantoprazole (PROTONIX) 40 MG EC tablet, Take 1 tablet by mouth Daily., Disp: 90 tablet, Rfl: 0  •  pantoprazole (PROTONIX) 40 MG EC tablet, Take 1 tablet by mouth Daily for 90 days., Disp: 90 tablet, Rfl: 1  •  ramipril (ALTACE) 5 MG capsule, Take 1 capsule by mouth Daily., Disp: 90 capsule, Rfl: 1  •  ranitidine (ZANTAC) 150 MG capsule, TAKE ONE CAPSULE BY MOUTH TWICE A DAY, Disp: 180 capsule, Rfl: 0  •  rosuvastatin (CRESTOR) 10 MG tablet, Take 1 tablet by mouth Daily., Disp: 30 tablet, Rfl: 5    Allergies   Allergen Reactions   • Lipitor [Atorvastatin] Myalgia   • Penicillins Hives and Swelling       Social History     Socioeconomic History   •  Marital status:      Spouse name: Not on file   • Number of children: Not on file   • Years of education: Not on file   • Highest education level: Not on file   Social Needs   • Financial resource strain: Not on file   • Food insecurity - worry: Not on file   • Food insecurity - inability: Not on file   • Transportation needs - medical: Not on file   • Transportation needs - non-medical: Not on file   Occupational History   • Not on file   Tobacco Use   • Smoking status: Former Smoker   • Smokeless tobacco: Never Used   • Tobacco comment: CAFFEINE USE   Substance and Sexual Activity   • Alcohol use: No   • Drug use: No   • Sexual activity: Yes     Partners: Female   Other Topics Concern   • Not on file   Social History Narrative   • Not on file       Family History   Problem Relation Age of Onset   • Heart disease Mother    • Heart attack Mother    • Stroke Mother    • Heart disease Father    • Heart attack Father    • Stroke Father    • Arthritis Other    • Diabetes Other    • Hypertension Other    • Kidney disease Other         stones   • Hypertension Sister    • Heart attack Brother    • Heart disease Brother    • No Known Problems Maternal Grandmother    • No Known Problems Maternal Grandfather    • No Known Problems Paternal Grandmother    • No Known Problems Paternal Grandfather    • No Known Problems Brother    • Heart disease Brother    • Heart attack Brother    • Diabetes Brother        Review of Systems   Gastrointestinal: Positive for abdominal distention, abdominal pain and nausea.   All other systems reviewed and are negative.      Vitals:    02/12/19 1052   BP: 148/80   Temp: 97.8 °F (36.6 °C)       Physical Exam   Constitutional: He is oriented to person, place, and time. He appears well-developed and well-nourished.   HENT:   Head: Normocephalic and atraumatic.   Eyes: Conjunctivae and EOM are normal.   Neck: Normal range of motion. No tracheal deviation present.   Cardiovascular: Normal rate  and regular rhythm.   Pulmonary/Chest: Effort normal and breath sounds normal. No respiratory distress.   Abdominal: Soft. Bowel sounds are normal. He exhibits no distension and no mass. There is no tenderness. There is no rebound and no guarding.   Musculoskeletal: Normal range of motion.   Neurological: He is alert and oriented to person, place, and time.   Skin: Skin is warm and dry.   Psychiatric: He has a normal mood and affect. Judgment normal.   Nursing note and vitals reviewed.      No images are attached to the encounter.  Problem list    chronic abdominal pain lower quadrants  Chronic abdominal pain epigastric region  Nausea  GERD  Colon polyps  Mandel's esophagus      Assessment/Plan    Based on  the above issues we will move to CAT scan of the abdomen and pelvis with oral and IV contrast  Based on the above issues we will move to EGD and colonoscopy  Continue PPI  Continue antispasmodic

## 2019-02-12 NOTE — H&P (VIEW-ONLY)
Chief Complaint   Patient presents with   • Abdominal Pain       Madhu Santoro is a 73 y.o. male who presents with chronic abdominal pain, GERD, colon polyps, Mandel's esophagus    Abdominal pain, epigastric region radiating into the lower quadrants improved with Bentyl and PPI.  EGD 3 years ago with Mandel's esophagus, colonoscopy with polyps removed.  Due for EGD and colonoscopy this year.  No weight loss, rectal bleeding or other alarm symptoms.        Past Medical History:   Diagnosis Date   • Anxiety    • Arthritis    • Atrial flutter (CMS/HCC)     Status post cavotricuspid isthmus ablation by Dr. Gustafson on 4/18/17   • Benign essential hypertension    • CAD (coronary artery disease)     3 vessel CABG 4/11/17 by Dr. Cortez: ROSARIO-prox LAD, SVG-OM1, SVG-OM3   • Carotid artery disease (CMS/HCC)     Status post carotid endarterectomy - USG 4/10/17: 50-59% NICHELLE, 1-15% LICA.    • Colonic polyp    • DDD (degenerative disc disease), lumbosacral    • H/O bone density study 2013   • H/O complete eye exam 2014   • HLD (hyperlipidemia)    • Hypertension    • Lipid screening 05/31/2013   • Low back pain     [facility]   • Screening for prostate cancer 07/07/2015   • Stroke (CMS/HCC)        Past Surgical History:   Procedure Laterality Date   • CARDIAC CATHETERIZATION N/A 4/10/2017    Procedure: Left Heart Cath;  Surgeon: Marjorie Healy MD;  Location: [facility] CATH INVASIVE LOCATION;  Service:    • CARDIAC CATHETERIZATION N/A 4/10/2017    Procedure: Coronary angiography;  Surgeon: Marjorie Healy MD;  [facility];  Service:    • CARDIAC CATHETERIZATION N/A 4/10/2017    Procedure: Left ventriculography;  Surgeon: Marjorie Healy MD;  [facility] C[facility] LOC[facility];  Service:    • CARDIAC ELECTROPHYSIOLOGY PROCEDURE N/A 4/18/2017    Procedure: Ablation atrial flutter;  Surgeon: Jose Antonio Gustafson MD;  [facility] CATH INVASIVE LOC[facility];  Service:    •  COLONOSCOPY  01/06/2015    Diverticulosis, one TA   • CORONARY ARTERY BYPASS GRAFT N/A 4/11/2017    Procedure: AR STERNOTOMY CORONARY ARTERY BYPASS GRAFT TIMES 3 USING LEFT INTERNAL MAMMARY ARTERY AND LEFT GREATER SAPHENOUS VEIN GRAFT PER ENDOSCOPIC VEIN HARVESTING AND PRP ;  Surgeon: Temo Cortez MD;  Location: [facility];  Service:    • ENDOSCOPY  01/06/2015    HH, Mandel's esophagus         Current Outpatient Medications:   •  albuterol sulfate  (90 Base) MCG/ACT inhaler, Inhale 2 puffs Every 4 (Four) Hours As Needed for Wheezing., Disp: 1 inhaler, Rfl: 0  •  clopidogrel (PLAVIX) 75 MG tablet, Take 1 tablet by mouth Daily., Disp: 30 tablet, Rfl: 5  •  dicyclomine (BENTYL) 20 MG tablet, Take 1 tablet by mouth Every 6 (Six) Hours As Needed (cramping)., Disp: 20 tablet, Rfl: 0  •  Hydrocodone-Acetaminophen (VICODIN) 5-300 MG per tablet, Take 1 tablet by mouth 2 (Two) Times a Day., Disp: 60 tablet, Rfl: 0  •  nitroglycerin (NITROSTAT) 0.4 MG SL tablet, Place 1 tablet under the tongue Every 5 (Five) Minutes As Needed for Chest Pain. Take no more than 3 doses in 15 minutes., Disp: 20 tablet, Rfl: 2  •  ondansetron ODT (ZOFRAN-ODT) 4 MG disintegrating tablet, Take 1 tablet by mouth Every 6 (Six) Hours As Needed for Nausea., Disp: 10 tablet, Rfl: 0  •  pantoprazole (PROTONIX) 40 MG EC tablet, Take 1 tablet by mouth Daily., Disp: 90 tablet, Rfl: 0  •  pantoprazole (PROTONIX) 40 MG EC tablet, Take 1 tablet by mouth Daily for 90 days., Disp: 90 tablet, Rfl: 1  •  ramipril (ALTACE) 5 MG capsule, Take 1 capsule by mouth Daily., Disp: 90 capsule, Rfl: 1  •  ranitidine (ZANTAC) 150 MG capsule, TAKE ONE CAPSULE BY MOUTH TWICE A DAY, Disp: 180 capsule, Rfl: 0  •  rosuvastatin (CRESTOR) 10 MG tablet, Take 1 tablet by mouth Daily., Disp: 30 tablet, Rfl: 5    Allergies   Allergen Reactions   • Lipitor [Atorvastatin] Myalgia   • Penicillins Hives and Swelling       Social History     Socioeconomic History   •  Marital status:      Spouse name: Not on file   • Number of children: Not on file   • Years of education: Not on file   • Highest education level: Not on file   Social Needs   • Financial resource strain: Not on file   • Food insecurity - worry: Not on file   • Food insecurity - inability: Not on file   • Transportation needs - medical: Not on file   • Transportation needs - non-medical: Not on file   Occupational History   • Not on file   Tobacco Use   • Smoking status: Former Smoker   • Smokeless tobacco: Never Used   • Tobacco comment: CAFFEINE USE   Substance and Sexual Activity   • Alcohol use: No   • Drug use: No   • Sexual activity: Yes     Partners: Female   Other Topics Concern   • Not on file   Social History Narrative   • Not on file       Family History   Problem Relation Age of Onset   • Heart disease Mother    • Heart attack Mother    • Stroke Mother    • Heart disease Father    • Heart attack Father    • Stroke Father    • Arthritis Other    • Diabetes Other    • Hypertension Other    • Kidney disease Other         stones   • Hypertension Sister    • Heart attack Brother    • Heart disease Brother    • No Known Problems Maternal Grandmother    • No Known Problems Maternal Grandfather    • No Known Problems Paternal Grandmother    • No Known Problems Paternal Grandfather    • No Known Problems Brother    • Heart disease Brother    • Heart attack Brother    • Diabetes Brother        Review of Systems   Gastrointestinal: Positive for abdominal distention, abdominal pain and nausea.   All other systems reviewed and are negative.      Vitals:    02/12/19 1052   BP: 148/80   Temp: 97.8 °F (36.6 °C)       Physical Exam   Constitutional: He is oriented to person, place, and time. He appears well-developed and well-nourished.   HENT:   Head: Normocephalic and atraumatic.   Eyes: Conjunctivae and EOM are normal.   Neck: Normal range of motion. No tracheal deviation present.   Cardiovascular: Normal rate  and regular rhythm.   Pulmonary/Chest: Effort normal and breath sounds normal. No respiratory distress.   Abdominal: Soft. Bowel sounds are normal. He exhibits no distension and no mass. There is no tenderness. There is no rebound and no guarding.   Musculoskeletal: Normal range of motion.   Neurological: He is alert and oriented to person, place, and time.   Skin: Skin is warm and dry.   Psychiatric: He has a normal mood and affect. Judgment normal.   Nursing note and vitals reviewed.      No images are attached to the encounter.  Problem list    chronic abdominal pain lower quadrants  Chronic abdominal pain epigastric region  Nausea  GERD  Colon polyps  Mandel's esophagus      Assessment/Plan    Based on  the above issues we will move to CAT scan of the abdomen and pelvis with oral and IV contrast  Based on the above issues we will move to EGD and colonoscopy  Continue PPI  Continue antispasmodic

## 2019-02-13 ENCOUNTER — HOSPITAL ENCOUNTER (OUTPATIENT)
Dept: CT IMAGING | Facility: HOSPITAL | Age: 74
Discharge: HOME | End: 2019-02-13
Admitting: INTERNAL MEDICINE

## 2019-02-13 DIAGNOSIS — D12.2 ADENOMATOUS POLYP OF ASCENDING COLON: ICD-10-CM

## 2019-02-13 DIAGNOSIS — R14.0 ABDOMINAL BLOATING: ICD-10-CM

## 2019-02-13 LAB — CREAT BLDA-MCNC: 0.9 MG/DL (ref 0.6–1.3)

## 2019-02-13 PROCEDURE — 25010000002 IOPAMIDOL 61 % SOLUTION: Performed by: INTERNAL MEDICINE

## 2019-02-13 PROCEDURE — 82565 ASSAY OF CREATININE: CPT

## 2019-02-13 PROCEDURE — 74177 CT ABD & PELVIS W/CONTRAST: CPT

## 2019-02-13 RX ADMIN — IOPAMIDOL 85 ML: 612 INJECTION, SOLUTION INTRAVENOUS at 07:45

## 2019-02-14 ENCOUNTER — ANESTHESIA EVENT (OUTPATIENT)
Dept: GASTROENTEROLOGY | Facility: HOSPITAL | Age: 74
End: 2019-02-14

## 2019-02-14 ENCOUNTER — HOSPITAL ENCOUNTER (OUTPATIENT)
Facility: HOSPITAL | Age: 74
Setting detail: HOSPITAL OUTPATIENT SURGERY
Discharge: HOME | End: 2019-02-14
Attending: INTERNAL MEDICINE | Admitting: INTERNAL MEDICINE

## 2019-02-14 ENCOUNTER — ANESTHESIA (OUTPATIENT)
Dept: GASTROENTEROLOGY | Facility: HOSPITAL | Age: 74
End: 2019-02-14

## 2019-02-14 VITALS
HEART RATE: 52 BPM | TEMPERATURE: 97.6 F | RESPIRATION RATE: 16 BRPM | WEIGHT: 174 LBS | BODY MASS INDEX: 26.37 KG/M2 | SYSTOLIC BLOOD PRESSURE: 128 MMHG | DIASTOLIC BLOOD PRESSURE: 66 MMHG | HEIGHT: 68 IN | OXYGEN SATURATION: 96 %

## 2019-02-14 DIAGNOSIS — D12.2 ADENOMATOUS POLYP OF ASCENDING COLON: ICD-10-CM

## 2019-02-14 DIAGNOSIS — R14.0 ABDOMINAL BLOATING: ICD-10-CM

## 2019-02-14 PROCEDURE — 45385 COLONOSCOPY W/LESION REMOVAL: CPT | Performed by: INTERNAL MEDICINE

## 2019-02-14 PROCEDURE — 88305 TISSUE EXAM BY PATHOLOGIST: CPT | Performed by: INTERNAL MEDICINE

## 2019-02-14 PROCEDURE — 25010000002 PROPOFOL 10 MG/ML EMULSION: Performed by: ANESTHESIOLOGY

## 2019-02-14 PROCEDURE — 43239 EGD BIOPSY SINGLE/MULTIPLE: CPT | Performed by: INTERNAL MEDICINE

## 2019-02-14 PROCEDURE — S0260 H&P FOR SURGERY: HCPCS | Performed by: INTERNAL MEDICINE

## 2019-02-14 DEVICE — DEV CLIP ENDO RESOLUTION360 CONTRL ROT 235CM: Type: IMPLANTABLE DEVICE | Site: DESCENDING COLON | Status: FUNCTIONAL

## 2019-02-14 RX ORDER — LIDOCAINE HYDROCHLORIDE 20 MG/ML
INJECTION, SOLUTION INFILTRATION; PERINEURAL AS NEEDED
Status: DISCONTINUED | OUTPATIENT
Start: 2019-02-14 | End: 2019-02-14 | Stop reason: SURG

## 2019-02-14 RX ORDER — SODIUM CHLORIDE 0.9 % (FLUSH) 0.9 %
3 SYRINGE (ML) INJECTION AS NEEDED
Status: DISCONTINUED | OUTPATIENT
Start: 2019-02-14 | End: 2019-02-14 | Stop reason: HOSPADM

## 2019-02-14 RX ORDER — LIDOCAINE HYDROCHLORIDE 10 MG/ML
0.5 INJECTION, SOLUTION INFILTRATION; PERINEURAL ONCE AS NEEDED
Status: DISCONTINUED | OUTPATIENT
Start: 2019-02-14 | End: 2019-02-14 | Stop reason: HOSPADM

## 2019-02-14 RX ORDER — PROPOFOL 10 MG/ML
VIAL (ML) INTRAVENOUS AS NEEDED
Status: DISCONTINUED | OUTPATIENT
Start: 2019-02-14 | End: 2019-02-14 | Stop reason: SURG

## 2019-02-14 RX ORDER — SODIUM CHLORIDE, SODIUM LACTATE, POTASSIUM CHLORIDE, CALCIUM CHLORIDE 600; 310; 30; 20 MG/100ML; MG/100ML; MG/100ML; MG/100ML
1000 INJECTION, SOLUTION INTRAVENOUS CONTINUOUS
Status: DISCONTINUED | OUTPATIENT
Start: 2019-02-14 | End: 2019-02-14 | Stop reason: HOSPADM

## 2019-02-14 RX ADMIN — PROPOFOL 150 MG: 10 INJECTION, EMULSION INTRAVENOUS at 11:06

## 2019-02-14 RX ADMIN — PROPOFOL 200 MG: 10 INJECTION, EMULSION INTRAVENOUS at 11:25

## 2019-02-14 RX ADMIN — SODIUM CHLORIDE, POTASSIUM CHLORIDE, SODIUM LACTATE AND CALCIUM CHLORIDE 1000 ML: 600; 310; 30; 20 INJECTION, SOLUTION INTRAVENOUS at 09:59

## 2019-02-14 RX ADMIN — LIDOCAINE HYDROCHLORIDE 75 MG: 20 INJECTION, SOLUTION INFILTRATION; PERINEURAL at 11:06

## 2019-02-14 NOTE — INTERVAL H&P NOTE
H&P updated. The patient was examined and the following changes are noted:  CAT scan was negative

## 2019-02-14 NOTE — ANESTHESIA PREPROCEDURE EVALUATION
Anesthesia Evaluation     Patient summary reviewed and Nursing notes reviewed   NPO Solid Status: > 8 hours  NPO Liquid Status: > 8 hours           Airway   Mallampati: II  TM distance: >3 FB  Neck ROM: full  Possible difficult intubation  Dental - normal exam   (+) partials    Pulmonary - normal exam   Cardiovascular - normal exam    ECG reviewed  PT is on anticoagulation therapy    (+) hypertension less than 2 medications, CAD, CABG >6 Months,  carotid artery disease      Neuro/Psych  (+) psychiatric history Anxiety,     GI/Hepatic/Renal/Endo    (+)  GERD,      Musculoskeletal     Abdominal    Substance History      OB/GYN          Other                      Anesthesia Plan    ASA 3     MAC     Anesthetic plan, all risks, benefits, and alternatives have been provided, discussed and informed consent has been obtained with: patient.

## 2019-02-14 NOTE — ANESTHESIA POSTPROCEDURE EVALUATION
"Patient: Madhu Santoro    Procedure Summary     Date:  02/14/19 Room / Location:   [facility]    Anesthesia Start:  1103 Anesthesia Stop:  1136    Procedures:       ESOPHAGOGASTRODUODENOSCOPY WITH BIOPSIES (N/A Esophagus)      COLONOSCOPY TO CECUM AND TERMINAL ILEUM WITH HOT SNARE POLYPECTOMYIES AND CLIP X2 (N/A ) Diagnosis:       Adenomatous polyp of ascending colon      Abdominal bloating      (Adenomatous polyp of ascending colon [D12.2])      (Abdominal bloating [R14.0])    Surgeon:  Everton Abel MD Provider:  Elizabeth Stewart MD    Anesthesia Type:  MAC ASA Status:  3          Anesthesia Type: MAC  Last vitals  BP   128/66 (02/14/19 1158)   Temp   36.4 °C (97.6 °F) (02/14/19 0937)   Pulse   52 (02/14/19 1158)   Resp   16 (02/14/19 1158)     SpO2   96 % (02/14/19 1158)     Post Anesthesia Care and Evaluation    Patient location during evaluation: PACU  Patient participation: complete - patient participated  Level of consciousness: awake and alert  Pain score: 0  Pain management: adequate  Airway patency: patent  Anesthetic complications: No anesthetic complications    Cardiovascular status: acceptable  Respiratory status: acceptable  Hydration status: acceptable    Comments: /66 (BP Location: Left arm, Patient Position: Lying)   Pulse 52   Temp 36.4 °C (97.6 °F) (Oral)   Resp 16   Ht 172.7 cm (68\")   Wt 78.9 kg (174 lb)   SpO2 96%   BMI 26.46 kg/m²       "

## 2019-02-14 NOTE — H&P
[facility], [facility]  Pre Procedure History & Physical    Chief Complaint:   Time for my colonoscopy    Subjective     HPI:   73 y.o. male     Past Medical History:   Past Medical History:   Diagnosis Date   • Anxiety    • Arthritis    • Atrial flutter (CMS/HCC)     Status post cavotricuspid isthmus ablation by Dr. Gustafson on 4/18/17   • Benign essential hypertension    • CAD (coronary artery disease)     3 vessel CABG 4/11/17 by Dr. Cortez: ROSARIO-prox LAD, SVG-OM1, SVG-OM3   • Carotid artery disease (CMS/HCC)     Status post carotid endarterectomy - USG 4/10/17: 50-59% NICHELLE, 1-15% LICA.    • Colonic polyp    • DDD (degenerative disc disease), lumbosacral    • GERD (gastroesophageal reflux disease)    • H/O bone density study 2013   • H/O complete eye exam 2014   • HLD (hyperlipidemia)    • Hypertension    • Lipid screening 05/31/2013   • Low back pain     [facility]   • Screening for prostate cancer 07/07/2015   • Stroke (CMS/HCC)        Family History:  Family History   Problem Relation Age of Onset   • Heart disease Mother    • Heart attack Mother    • Stroke Mother    • Heart disease Father    • Heart attack Father    • Stroke Father    • Arthritis Other    • Diabetes Other    • Hypertension Other    • Kidney disease Other         stones   • Hypertension Sister    • Heart attack Brother    • Heart disease Brother    • No Known Problems Maternal Grandmother    • No Known Problems Maternal Grandfather    • No Known Problems Paternal Grandmother    • No Known Problems Paternal Grandfather    • No Known Problems Brother    • Heart disease Brother    • Heart attack Brother    • Diabetes Brother        Social History:   reports that he has quit smoking. he has never used smokeless tobacco. He reports that he drinks alcohol. He reports that he does not use drugs.    Medications:   Medications Prior to Admission   Medication Sig Dispense Refill Last Dose   • albuterol sulfate  " (90 Base) MCG/ACT inhaler Inhale 2 puffs Every 4 (Four) Hours As Needed for Wheezing. 1 inhaler 0 Past Week at Unknown time   • Calcium-Magnesium-Vitamin D (CALCIUM 500 PO) Take 1 tablet by mouth Daily.   2/13/2019 at Unknown time   • clopidogrel (PLAVIX) 75 MG tablet Take 1 tablet by mouth Daily. 30 tablet 5 2/13/2019 at Unknown time   • pantoprazole (PROTONIX) 40 MG EC tablet Take 1 tablet by mouth Daily for 90 days. 90 tablet 1 2/13/2019 at Unknown time   • ramipril (ALTACE) 5 MG capsule Take 1 capsule by mouth Daily. 90 capsule 1 2/13/2019 at Unknown time   • ranitidine (ZANTAC) 150 MG capsule TAKE ONE CAPSULE BY MOUTH TWICE A  capsule 0 Patient Taking Differently at Unknown time   • rosuvastatin (CRESTOR) 10 MG tablet Take 1 tablet by mouth Daily. 30 tablet 5 2/13/2019 at Unknown time   • dicyclomine (BENTYL) 20 MG tablet Take 1 tablet by mouth Every 6 (Six) Hours As Needed (cramping). 20 tablet 0 Unknown at Unknown time   • Hydrocodone-Acetaminophen (VICODIN) 5-300 MG per tablet Take 1 tablet by mouth 2 (Two) Times a Day. 60 tablet 0 Unknown at Unknown time   • nitroglycerin (NITROSTAT) 0.4 MG SL tablet Place 1 tablet under the tongue Every 5 (Five) Minutes As Needed for Chest Pain. Take no more than 3 doses in 15 minutes. 20 tablet 2 Taking   • ondansetron ODT (ZOFRAN-ODT) 4 MG disintegrating tablet Take 1 tablet by mouth Every 6 (Six) Hours As Needed for Nausea. 10 tablet 0 Taking   • pantoprazole (PROTONIX) 40 MG EC tablet Take 1 tablet by mouth Daily. 90 tablet 0 Taking       Allergies:  Lipitor [atorvastatin] and Penicillins    ROS:    Pertinent items are noted in HPI     Objective     Blood pressure 125/77, pulse 91, temperature 97.6 °F (36.4 °C), temperature source Oral, resp. rate 16, height 172.7 cm (68\"), weight 78.9 kg (174 lb), SpO2 98 %.    Physical Exam   Constitutional: Pt is oriented to person, place, and time and well-developed, well-nourished, and in no distress.   HENT: "   Mouth/Throat: Oropharynx is clear and moist.   Neck: Normal range of motion. Neck supple.   Cardiovascular: Normal rate, regular rhythm and normal heart sounds.    Pulmonary/Chest: Effort normal and breath sounds normal. No respiratory distress. No  wheezes.   Abdominal: Soft. Bowel sounds are normal.   Skin: Skin is warm and dry.   Psychiatric: Mood, memory, affect and judgment normal.     Assessment/Plan     Diagnosis:  Encounter for screening for colon cancer    Anticipated Surgical Procedure:  Colonoscopy    The risks, benefits, and alternatives of this procedure have been discussed with the patient or the responsible party- the patient understands and agrees to proceed.

## 2019-02-15 ENCOUNTER — TELEPHONE (OUTPATIENT)
Dept: GASTROENTEROLOGY | Facility: CLINIC | Age: 74
End: 2019-02-15

## 2019-02-15 DIAGNOSIS — R10.84 GENERALIZED ABDOMINAL PAIN: Primary | ICD-10-CM

## 2019-02-15 LAB
CYTO UR: NORMAL
LAB AP CASE REPORT: NORMAL
PATH REPORT.FINAL DX SPEC: NORMAL
PATH REPORT.GROSS SPEC: NORMAL

## 2019-02-15 NOTE — TELEPHONE ENCOUNTER
----- Message from Everton Abel MD sent at 2/15/2019 11:11 AM EST -----  Tubular adenoma colon polyps  Colonoscopy recall one year  Schedule Doppler flow/duplex of the mesenteric vasculature in the vascular lab  Office visit nurse practitioner 4 weeks

## 2019-02-15 NOTE — TELEPHONE ENCOUNTER
Patient called, advised as per Dr. Abel's note. He verb understanding and is in agreement with the plan.

## 2019-02-15 NOTE — PROGRESS NOTES
Tubular adenoma colon polyps  Colonoscopy recall one year  Schedule Doppler flow/duplex of the mesenteric vasculature in the vascular lab  Office visit nurse practitioner 4 weeks

## 2019-02-25 ENCOUNTER — HOSPITAL ENCOUNTER (OUTPATIENT)
Dept: CARDIOLOGY | Facility: HOSPITAL | Age: 74
Discharge: HOME | End: 2019-02-25
Admitting: INTERNAL MEDICINE

## 2019-02-25 DIAGNOSIS — R10.84 GENERALIZED ABDOMINAL PAIN: ICD-10-CM

## 2019-02-25 LAB
BH CV VAS SMA 1ST PP CELIAC EDV: 47 CM/S
BH CV VAS SMA 1ST PP CELIAC PSV: 233 CM/S
BH CV VAS SMA 1ST PP SMA EDV: 53 CM/S
BH CV VAS SMA 1ST PP SMA PSV: 291 CM/S
BH CV VAS SMA 1ST PP TIME: 15 MIN
BH CV VAS SMA 2ND PP TIME: 30 MIN
BH CV VAS SMA 3RD PP TIME: 45 MIN
BH CV VAS SMA AORTA EDV: 0 CM/S
BH CV VAS SMA AORTA PSV: 76 CM/S
BH CV VAS SMA CELIAC DIST EDV: 31 CM/S
BH CV VAS SMA CELIAC DIST PSV: 178 CM/S
BH CV VAS SMA CELIAC MID EDV: 35 CM/S
BH CV VAS SMA CELIAC MID PSV: 189 CM/S
BH CV VAS SMA CELIAC PROX EDV: 47 CM/S
BH CV VAS SMA CELIAC PROX PSV: 213 CM/S
BH CV VAS SMA HEPATIC EDV: 15 CM/S
BH CV VAS SMA HEPATIC PSV: 100 CM/S
BH CV VAS SMA IMA EDV: 0 CM/S
BH CV VAS SMA IMA PSV: 152 CM/S
BH CV VAS SMA ORIGIN EDV: 25 CM/S
BH CV VAS SMA ORIGIN PSV: 213 CM/S
BH CV VAS SMA SMA DIST EDV: 0 CM/S
BH CV VAS SMA SMA DIST PSV: 85 CM/S
BH CV VAS SMA SMA MID EDV: 16 CM/S
BH CV VAS SMA SMA MID PSV: 123 CM/S
BH CV VAS SMA SMA PROX EDV: 20 CM/S
BH CV VAS SMA SMA PROX PSV: 161 CM/S
BH CV VAS SMA SPLENIC EDV: 42 CM/S
BH CV VAS SMA SPLENIC PSV: 153 CM/S

## 2019-02-25 PROCEDURE — 93975 VASCULAR STUDY: CPT

## 2019-02-25 NOTE — PROGRESS NOTES
Mesenteric Dopplers are normal  CT scan showed a small pancreas cyst, repeat CT scan with IV contrast  in 3 months/pancreas protocol

## 2019-03-01 ENCOUNTER — TELEPHONE (OUTPATIENT)
Dept: GASTROENTEROLOGY | Facility: CLINIC | Age: 74
End: 2019-03-01

## 2019-03-01 DIAGNOSIS — M51.37 DDD (DEGENERATIVE DISC DISEASE), LUMBOSACRAL: ICD-10-CM

## 2019-03-01 DIAGNOSIS — K86.2 PANCREATIC CYST: Primary | ICD-10-CM

## 2019-03-01 NOTE — TELEPHONE ENCOUNTER
----- Message from Tien Ramirez sent at 3/1/2019 12:40 PM EST -----  Regarding: pt called for test results   Contact: [phone]  ..

## 2019-03-01 NOTE — TELEPHONE ENCOUNTER
Patient called, advised as per Dr. Abel's note. He states he is having epigastric pain especially at night. States sometimes in the middle of the night he has to get up and take a dose of Mylanta. He states he does take Pantoprazole every morning.  He states during the day its mostly a dull ache in his epigastric area. Denies nausea or vomiting. Advised will send an update to Dr. Abel for advisement.

## 2019-03-01 NOTE — TELEPHONE ENCOUNTER
----- Message from Everton Abel MD sent at 2/25/2019  1:07 PM EST -----  Mesenteric Dopplers are normal  CT scan showed a small pancreas cyst, repeat CT scan with IV contrast  in 3 months/pancreas protocol

## 2019-03-03 ENCOUNTER — PREP FOR SURGERY (OUTPATIENT)
Dept: OTHER | Facility: HOSPITAL | Age: 74
End: 2019-03-03

## 2019-03-03 DIAGNOSIS — K21.9 CHRONIC GERD: Primary | ICD-10-CM

## 2019-03-03 RX ORDER — HYDROCODONE BITARTRATE AND ACETAMINOPHEN 5; 300 MG/1; MG/1
1 TABLET ORAL 2 TIMES DAILY
Qty: 60 TABLET | Refills: 0 | Status: SHIPPED | OUTPATIENT
Start: 2019-03-03 | End: 2019-04-02 | Stop reason: SDUPTHER

## 2019-03-04 NOTE — TELEPHONE ENCOUNTER
"Per Dr Abel: \"lets schedule him for EGD with [facility], case request is in   To be scheduled at [facility] in 2 weeks   Have him come off all PPI for 2 weeks, he can use over-the-counter Zantac or Pepcid, Tums, Mylanta if need be.  He should do no medications for heartburn 24 hours before procedure     thx\"  "

## 2019-03-04 NOTE — TELEPHONE ENCOUNTER
Called pt and advised of the note from Dr Abel. Pt verb understanding and would like to come in and talk to MD prior to scheduling. Appt made for 33/12 at 9:15AM. Pt also mentioned that he is scheduled for his CT scan this Wed, 3/6. Advised CT is not due until mid-May. Advised will cancel appt and place new order.   Order in pt's chart is CT of chest. MD ordered CT with pancreatic protocol. New CT order placed.

## 2019-03-06 ENCOUNTER — APPOINTMENT (OUTPATIENT)
Dept: CT IMAGING | Facility: HOSPITAL | Age: 74
End: 2019-03-06

## 2019-03-12 ENCOUNTER — OFFICE VISIT (OUTPATIENT)
Dept: GASTROENTEROLOGY | Facility: CLINIC | Age: 74
End: 2019-03-12

## 2019-03-12 VITALS
SYSTOLIC BLOOD PRESSURE: 136 MMHG | BODY MASS INDEX: 27.16 KG/M2 | TEMPERATURE: 98.6 F | DIASTOLIC BLOOD PRESSURE: 74 MMHG | WEIGHT: 179.2 LBS | HEIGHT: 68 IN

## 2019-03-12 DIAGNOSIS — R14.0 ABDOMINAL BLOATING: ICD-10-CM

## 2019-03-12 DIAGNOSIS — D12.2 ADENOMATOUS POLYP OF ASCENDING COLON: ICD-10-CM

## 2019-03-12 DIAGNOSIS — K86.2 PANCREATIC CYST: Primary | ICD-10-CM

## 2019-03-12 DIAGNOSIS — R10.84 GENERALIZED ABDOMINAL PAIN: ICD-10-CM

## 2019-03-12 PROCEDURE — 99213 OFFICE O/P EST LOW 20 MIN: CPT | Performed by: INTERNAL MEDICINE

## 2019-03-12 NOTE — PROGRESS NOTES
Chief Complaint   Patient presents with   • Abdominal Pain       Madhu Santoro is a  73 y.o. male here for a follow up visit for epigastric pain    73-year-old with chronic epigastric pain that has not changed and is been present for many years.  EGD, colonoscopy was within normal limits except for some small colon polyps.  Mesenteric Dopplers were negative.  CAT scan revealed a 1.7 cm cystic lesion in the head of the pancreas.  He has had no jaundice, weight loss, nausea or vomiting.        Past Medical History:   Diagnosis Date   • Anxiety    • Arthritis    • Atrial flutter (CMS/HCC)     Status post cavotricuspid isthmus ablation by Dr. Gustafson on 4/18/17   • Mandel esophagus    • Benign essential hypertension    • CAD (coronary artery disease)     3 vessel CABG 4/11/17 by Dr. Cortez: ROSARIO-prox LAD, SVG-OM1, SVG-OM3   • Carotid artery disease (CMS/HCC)     Status post carotid endarterectomy - USG 4/10/17: 50-59% NICHELLE, 1-15% LICA.    • Colonic polyp    • DDD (degenerative disc disease), lumbosacral    • GERD (gastroesophageal reflux disease)    • H/O bone density study 2013   • H/O complete eye exam 2014   • HLD (hyperlipidemia)    • Hypertension    • Lipid screening 05/31/2013   • Low back pain     [facility]   • Screening for prostate cancer 07/07/2015   • Stroke (CMS/HCC)        Past Surgical History:   Procedure Laterality Date   • CARDIAC CATHETERIZATION N/A 4/10/2017    Procedure: Left Heart Cath;  Surgeon: Marjorie Healy MD;  Location: [facility] CATH INVASIVE LOCATION;  Service:    • CARDIAC CATHETERIZATION N/A 4/10/2017    Procedure: Coronary angiography;  Surgeon: Marjorie Healy MD;  [facility];  Service:    • CARDIAC CATHETERIZATION N/A 4/10/2017    Procedure: Left ventriculography;  Surgeon: Marjorie Healy MD;  [facility];  Service:    • CARDIAC ELECTROPHYSIOLOGY PROCEDURE N/A 4/18/2017    Procedure: Ablation  atrial flutter;  Surgeon: Jose Antonio Gustafson MD;  Location: [facility] CATH INVASIVE LOCATION;  Service:    • COLONOSCOPY  01/06/2015    Diverticulosis, one TA   • COLONOSCOPY N/A 2/14/2019    tics, NBIH, adenomatous polyp x 2   • CORONARY ARTERY BYPASS GRAFT N/A 4/11/2017    Procedure: AR STERNOTOMY CORONARY ARTERY BYPASS GRAFT TIMES 3 USING LEFT INTERNAL MAMMARY ARTERY AND LEFT GREATER SAPHENOUS VEIN GRAFT PER ENDOSCOPIC VEIN HARVESTING AND PRP ;  Surgeon: Temo Cortez MD;  Location: [facility] OR;  Service:    • ENDOSCOPY  01/06/2015    HH, Mandel's esophagus   • ENDOSCOPY N/A 2/14/2019    Z line irregular, HH, Mandel's esophagus   • VASECTOMY         Scheduled Meds:    Continuous Infusions:  No current facility-administered medications for this visit.     PRN Meds:.    Allergies   Allergen Reactions   • Lipitor [Atorvastatin] Myalgia   • Penicillins Hives and Swelling       Social History     Socioeconomic History   • Marital status:      Spouse name: Not on file   • Number of children: Not on file   • Years of education: Not on file   • Highest education level: Not on file   Social Needs   • Financial resource strain: Not on file   • Food insecurity - worry: Not on file   • Food insecurity - inability: Not on file   • Transportation needs - medical: Not on file   • Transportation needs - non-medical: Not on file   Occupational History   • Not on file   Tobacco Use   • Smoking status: Former Smoker   • Smokeless tobacco: Never Used   • Tobacco comment: CAFFEINE USE   Substance and Sexual Activity   • Alcohol use: Yes     Comment: SOCIALLY   • Drug use: No   • Sexual activity: Yes     Partners: Female   Other Topics Concern   • Not on file   Social History Narrative   • Not on file       Family History   Problem Relation Age of Onset   • Heart disease Mother    • Heart attack Mother    • Stroke Mother    • Heart disease Father    • Heart attack Father    • Stroke Father    • Arthritis Other    • Diabetes  Other    • Hypertension Other    • Kidney disease Other         stones   • Hypertension Sister    • Heart attack Brother    • Heart disease Brother    • No Known Problems Maternal Grandmother    • No Known Problems Maternal Grandfather    • No Known Problems Paternal Grandmother    • No Known Problems Paternal Grandfather    • No Known Problems Brother    • Heart disease Brother    • Heart attack Brother    • Diabetes Brother        Review of Systems   Gastrointestinal: Positive for abdominal distention, abdominal pain and diarrhea. Negative for nausea, rectal pain and vomiting.   All other systems reviewed and are negative.      Vitals:    03/12/19 0909   BP: 136/74   Temp: 98.6 °F (37 °C)       Physical Exam   Constitutional: He is oriented to person, place, and time. He appears well-developed and well-nourished.   HENT:   Head: Normocephalic and atraumatic.   Eyes: Conjunctivae and EOM are normal.   Neck: Normal range of motion. No tracheal deviation present.   Cardiovascular: Normal rate and regular rhythm.   Pulmonary/Chest: Effort normal and breath sounds normal. No respiratory distress.   Abdominal: Soft. Bowel sounds are normal. He exhibits no distension and no mass. There is tenderness. There is no rebound and no guarding.   Musculoskeletal: Normal range of motion.   Neurological: He is alert and oriented to person, place, and time.   Skin: Skin is warm and dry.   Psychiatric: He has a normal mood and affect. Judgment normal.   Nursing note and vitals reviewed.      No images are attached to the encounter.    Problem list    Epigastric pain  1.7 cm cystic lesion of the head of the pancreas      Assessment/Plan    We will refer him on for endoscopic ultrasound of the pancreas  If the biliary endoscopist does not believe that this lesion is large enough to needle, then we will perform another CT scan with fine cuts of the pancreas in 3-6 mos  We will continue PPI and Bentyl for epigastric pain  He will use  Zantac as needed for heartburn

## 2019-03-13 ENCOUNTER — OFFICE (OUTPATIENT)
Dept: URBAN - METROPOLITAN AREA CLINIC 75 | Facility: CLINIC | Age: 74
End: 2019-03-13
Payer: COMMERCIAL

## 2019-03-13 VITALS
WEIGHT: 184 LBS | DIASTOLIC BLOOD PRESSURE: 74 MMHG | HEIGHT: 68 IN | SYSTOLIC BLOOD PRESSURE: 142 MMHG | HEART RATE: 62 BPM

## 2019-03-13 DIAGNOSIS — K86.2 CYST OF PANCREAS: ICD-10-CM

## 2019-03-13 DIAGNOSIS — R10.13 EPIGASTRIC PAIN: ICD-10-CM

## 2019-03-13 PROCEDURE — 99202 OFFICE O/P NEW SF 15 MIN: CPT

## 2019-03-15 ENCOUNTER — ON CAMPUS - OUTPATIENT (OUTPATIENT)
Dept: URBAN - METROPOLITAN AREA HOSPITAL 108 | Facility: HOSPITAL | Age: 74
End: 2019-03-15
Payer: COMMERCIAL

## 2019-03-15 DIAGNOSIS — R10.13 EPIGASTRIC PAIN: ICD-10-CM

## 2019-03-15 DIAGNOSIS — R93.3 ABNORMAL FINDINGS ON DIAGNOSTIC IMAGING OF OTHER PARTS OF DI: ICD-10-CM

## 2019-03-15 PROCEDURE — 43259 EGD US EXAM DUODENUM/JEJUNUM: CPT

## 2019-03-29 ENCOUNTER — OFFICE VISIT (OUTPATIENT)
Dept: FAMILY MEDICINE CLINIC | Facility: CLINIC | Age: 74
End: 2019-03-29

## 2019-03-29 VITALS
BODY MASS INDEX: 27.58 KG/M2 | SYSTOLIC BLOOD PRESSURE: 161 MMHG | HEIGHT: 68 IN | DIASTOLIC BLOOD PRESSURE: 72 MMHG | WEIGHT: 182 LBS | RESPIRATION RATE: 16 BRPM | TEMPERATURE: 97.6 F | HEART RATE: 51 BPM | OXYGEN SATURATION: 97 %

## 2019-03-29 DIAGNOSIS — I10 BENIGN ESSENTIAL HYPERTENSION: ICD-10-CM

## 2019-03-29 DIAGNOSIS — I48.3 TYPICAL ATRIAL FLUTTER (HCC): ICD-10-CM

## 2019-03-29 DIAGNOSIS — I25.10 CORONARY ARTERY DISEASE DUE TO LIPID RICH PLAQUE: ICD-10-CM

## 2019-03-29 DIAGNOSIS — I73.9 PAD (PERIPHERAL ARTERY DISEASE) (HCC): ICD-10-CM

## 2019-03-29 DIAGNOSIS — I63.9 CEREBROVASCULAR ACCIDENT (CVA), UNSPECIFIED MECHANISM (HCC): Primary | ICD-10-CM

## 2019-03-29 DIAGNOSIS — I25.83 CORONARY ARTERY DISEASE DUE TO LIPID RICH PLAQUE: ICD-10-CM

## 2019-03-29 PROCEDURE — 99214 OFFICE O/P EST MOD 30 MIN: CPT | Performed by: FAMILY MEDICINE

## 2019-03-29 RX ORDER — CLOPIDOGREL BISULFATE 75 MG/1
75 TABLET ORAL DAILY
Qty: 90 TABLET | Refills: 1 | Status: SHIPPED | OUTPATIENT
Start: 2019-03-29 | End: 2019-09-16 | Stop reason: SDUPTHER

## 2019-03-29 RX ORDER — ROSUVASTATIN CALCIUM 10 MG/1
10 TABLET, COATED ORAL DAILY
Qty: 90 TABLET | Refills: 1 | Status: SHIPPED | OUTPATIENT
Start: 2019-03-29 | End: 2019-09-16 | Stop reason: SDUPTHER

## 2019-03-29 RX ORDER — RAMIPRIL 5 MG/1
5 CAPSULE ORAL DAILY
Qty: 90 CAPSULE | Refills: 1 | Status: SHIPPED | OUTPATIENT
Start: 2019-03-29 | End: 2019-09-16 | Stop reason: SDUPTHER

## 2019-03-29 RX ORDER — PANTOPRAZOLE SODIUM 40 MG/1
40 TABLET, DELAYED RELEASE ORAL DAILY
Qty: 90 TABLET | Refills: 1 | Status: SHIPPED | OUTPATIENT
Start: 2019-03-29 | End: 2019-09-16 | Stop reason: SDUPTHER

## 2019-03-29 NOTE — PROGRESS NOTES
Subjective   Chief Complaint:   Chief Complaint   Patient presents with   • Hypertension   • Hyperlipidemia   • Heartburn         History of Present Illness comes in today to refill his medications.  He is also tells me about seeing a specialist Dr. García about his pancreas and he is going back to see Dr. Sharp he again also.  I reviewed the labs that he has had by other doctors and it looks like he is pretty much up-to-date on labs.  All disorder the routine labs and I want to do in 3 months.  I refilled his medications he is not really ready for a refill on his pain meds yet.  I reviewed his labs and numbness can go ahead and order my routine labs in 3 months.  Chest is clear abdomen slightly tender will await consult from Dr. Zimmer see Dr. García.  And I reviewed his labs.            Madhu Santoro 73 y.o. male who presents today for Medical Management of the below listed issues and medication refills.    ICD-10-CM ICD-9-CM   1. Cerebrovascular accident (CVA), unspecified mechanism (CMS/HCC) I63.9 434.91   2. Benign essential hypertension I10 401.1   3. Coronary artery disease due to lipid rich plaque I25.10 414.00    I25.83 414.3        he has a problem list of   Patient Active Problem List   Diagnosis   • Anxiety   • Arthritis   • Chronic coronary artery disease   • HLD (hyperlipidemia)   • Benign essential hypertension   • DDD (degenerative disc disease), lumbosacral   • Cerebrovascular accident (CMS/HCC)   • Encounter for screening for cardiovascular disorders   • Gastroesophageal reflux disease   • Hyperlipidemia   • Stenosis of carotid artery   • Former smoker   • History of cardiac catheterization   • S/P ablation of atrial flutter   • Typical atrial flutter (CMS/HCC)   • S/P CABG (coronary artery bypass graft)   • Adenomatous polyp of ascending colon   • Abdominal bloating   • Stroke (CMS/HCC)   .  Since the last visit, he has overall felt well.  he has been compliant with   Current Outpatient  "Medications:   •  albuterol sulfate  (90 Base) MCG/ACT inhaler, Inhale 2 puffs Every 4 (Four) Hours As Needed for Wheezing., Disp: 1 inhaler, Rfl: 0  •  clopidogrel (PLAVIX) 75 MG tablet, Take 1 tablet by mouth Daily., Disp: 30 tablet, Rfl: 5  •  HYDROcodone-Acetaminophen (VICODIN) 5-300 MG per tablet, Take 1 tablet by mouth 2 (Two) Times a Day., Disp: 60 tablet, Rfl: 0  •  nitroglycerin (NITROSTAT) 0.4 MG SL tablet, Place 1 tablet under the tongue Every 5 (Five) Minutes As Needed for Chest Pain. Take no more than 3 doses in 15 minutes., Disp: 20 tablet, Rfl: 2  •  pantoprazole (PROTONIX) 40 MG EC tablet, Take 1 tablet by mouth Daily., Disp: 90 tablet, Rfl: 0  •  ramipril (ALTACE) 5 MG capsule, Take 1 capsule by mouth Daily., Disp: 90 capsule, Rfl: 1  •  rosuvastatin (CRESTOR) 10 MG tablet, Take 1 tablet by mouth Daily., Disp: 30 tablet, Rfl: 5.  he denies medication side effects.    All of the chronic condition(s) listed above are stable w/o issues.    /72   Pulse 51   Temp 97.6 °F (36.4 °C)   Resp 16   Ht 172.7 cm (68\")   Wt 82.6 kg (182 lb)   SpO2 97%   BMI 27.67 kg/m²     Results for orders placed or performed during the hospital encounter of 02/25/19   Duplex Mesenteric Complete CAR   Result Value Ref Range    SMA Aorta PSV 76 cm/s    SMA Aorta EDV 0 cm/s    SMA Celiac Prox  cm/s    SMA Celiac Prox EDV 47 cm/s    SMA 1st PP time 15 min    SMA 2nd PP time 30 min    SMA 3rd PP time 45 min    SMA origin EDV 25 cm/s    SMA origin  cm/s    SMA Celiac Mid  cm/s    SMA Celiac Mid EDV 35 cm/s    SMA Celiac Dist  cm/s    SMA Celiac Dist EDV 31 cm/s    SMA SMA Prox  cm/s    SMA SMA Prox EDV 20 cm/s    SMA SMA Mid  cm/s    SMA SMA Mid EDV 16 cm/s    SMA SMA Dist PSV 85 cm/s    SMA SMA Dist EDV 0 cm/s    SMA KIARA  cm/s    SMA KIARA EDV 0 cm/s    SMA Hepatic  cm/s    SMA Hepatic EDV 15 cm/s    SMA Splenic  cm/s    SMA Splenic EDV 42 cm/s    SMA " 1st PP Celiac  cm/s    SMA 1st PP Celiac EDV 47 cm/s    SMA 1st PP SMA  cm/s    SMA 1st PP SMA EDV 53 cm/s             The following portions of the patient's history were reviewed and updated as appropriate: allergies, current medications, past family history, past medical history, past social history, past surgical history and problem list.    Review of Systems   Constitutional: Negative for activity change, appetite change and unexpected weight change.   Eyes: Negative for visual disturbance.   Respiratory: Negative for chest tightness and shortness of breath.    Cardiovascular: Negative for chest pain and palpitations.   Gastrointestinal: Positive for abdominal pain. Negative for abdominal distention.        Tender epigastric area   Genitourinary: Negative for difficulty urinating.   Musculoskeletal: Negative for back pain.   Skin: Negative for color change.   Neurological: Negative for syncope and speech difficulty.   Psychiatric/Behavioral: Negative for confusion and decreased concentration.       Objective   Physical Exam   Constitutional: He is oriented to person, place, and time. He appears well-developed and well-nourished.   HENT:   Head: Atraumatic.   Mouth/Throat: Oropharynx is clear and moist.   Eyes: EOM are normal. Pupils are equal, round, and reactive to light.   Neck: Normal range of motion. Neck supple. No thyromegaly present.   Cardiovascular: Normal rate and regular rhythm.   Pulmonary/Chest: Effort normal and breath sounds normal.   Abdominal: Soft. There is tenderness. There is no guarding.   Musculoskeletal: Normal range of motion.   Neurological: He is alert and oriented to person, place, and time.   Skin: Skin is warm and dry.   Psychiatric: He has a normal mood and affect. His behavior is normal.   Nursing note and vitals reviewed.      Assessment/Plan   Madhu was seen today for hypertension, hyperlipidemia and heartburn.    Diagnoses and all orders for this  visit:    Cerebrovascular accident (CVA), unspecified mechanism (CMS/HCC)  -     clopidogrel (PLAVIX) 75 MG tablet; Take 1 tablet by mouth Daily.    Benign essential hypertension  -     clopidogrel (PLAVIX) 75 MG tablet; Take 1 tablet by mouth Daily.    Coronary artery disease due to lipid rich plaque  -     ramipril (ALTACE) 5 MG capsule; Take 1 capsule by mouth Daily.    Other orders  -     pantoprazole (PROTONIX) 40 MG EC tablet; Take 1 tablet by mouth Daily.  -     rosuvastatin (CRESTOR) 10 MG tablet; Take 1 tablet by mouth Daily.

## 2019-04-02 DIAGNOSIS — M51.37 DDD (DEGENERATIVE DISC DISEASE), LUMBOSACRAL: ICD-10-CM

## 2019-04-06 RX ORDER — HYDROCODONE BITARTRATE AND ACETAMINOPHEN 5; 300 MG/1; MG/1
1 TABLET ORAL 2 TIMES DAILY
Qty: 60 TABLET | Refills: 0 | Status: SHIPPED | OUTPATIENT
Start: 2019-04-06 | End: 2019-05-08 | Stop reason: SDUPTHER

## 2019-04-17 ENCOUNTER — OFFICE VISIT (OUTPATIENT)
Dept: CARDIOLOGY | Facility: CLINIC | Age: 74
End: 2019-04-17

## 2019-04-17 VITALS
HEART RATE: 60 BPM | DIASTOLIC BLOOD PRESSURE: 62 MMHG | OXYGEN SATURATION: 98 % | SYSTOLIC BLOOD PRESSURE: 118 MMHG | BODY MASS INDEX: 27.43 KG/M2 | HEIGHT: 68 IN | WEIGHT: 181 LBS

## 2019-04-17 DIAGNOSIS — I77.9 CAROTID ARTERY DISEASE, UNSPECIFIED LATERALITY, UNSPECIFIED TYPE (HCC): ICD-10-CM

## 2019-04-17 DIAGNOSIS — I73.9 PVD (PERIPHERAL VASCULAR DISEASE) (HCC): ICD-10-CM

## 2019-04-17 DIAGNOSIS — Z98.890 STATUS POST CATHETER ABLATION OF ATRIAL FLUTTER: ICD-10-CM

## 2019-04-17 DIAGNOSIS — I25.810 CORONARY ARTERY DISEASE INVOLVING CORONARY BYPASS GRAFT OF NATIVE HEART WITHOUT ANGINA PECTORIS: Primary | ICD-10-CM

## 2019-04-17 PROCEDURE — 99213 OFFICE O/P EST LOW 20 MIN: CPT | Performed by: INTERNAL MEDICINE

## 2019-05-05 NOTE — PROGRESS NOTES
Date of Office Visit: 2019  Encounter Provider: Eric Wright MD  Place of Service: [facility] CARDIOLOGY  Patient Name: Madhu Santoro  :1945    Chief complaint: Follow-up for coronary artery disease, atrial flutter status post   ablation, PVD, and carotid artery disease.    History of Present Illness:    Dear Dr. Chen:      I again had the pleasure of seeing your patient in cardiology office on 2019.  As you   well know, he is a very pleasant 73 year-old white male with a past medical history   significant for coronary artery disease, prior carotid endarterectomy, and atrial flutter   status post ablation who presents for follow-up.  The patient formerly followed with Dr. Roger from the [facility].  He has a history of a carotid endarterectomy   in the past, and also has a history of a stroke for which he took Plavix for many years.       He presented to the [facility] emergency department on 2017 after   his primary care physician noted that he had a rapid heart rate of 150 bpm.  He was   found to be in typical atrial flutter at the time, which was a new diagnosis for him, although   he was completely asymptomatic with regards to the atrial flutter and was unaware that   he was in this.  He also had reported that he had experienced some chest pressure   radiating into his throat earlier in the week.  He was converted to sinus rhythm with an   amiodarone drip, and an echocardiogram on 2017 showed an ejection fraction of   55-60%, and no significant valvular disease.  He ultimately underwent a diagnostic left   heart catheterization on 4/10/2017 by Dr. Healy for presumed unstable angina and a   known history of coronary artery disease.  This showed severe coronary artery disease,   including a distal 80% severely calcified lesion in the left main extending into the ostium   of the left circumflex coronary artery.  He  was then referred for a coronary artery bypass   grafting operation, and underwent this by Dr. Cortez on 4/11/2017.  At that time, he had   a LIMA to LAD, SVG to OM1, and SVG to OM 3.  He did well postoperatively, but continued   to have significant issues with rapid atrial flutter which was largely unresponsive to   amiodarone at the time.  He ultimately underwent a cavotricuspid isthmus atrial flutter   ablation by Dr. Gustafson on 4/18/2017.  A preoperative AR performed the day prior to   this did not show any evidence for thrombus formation.  He did develop bradycardia on   even minimal doses of metoprolol as an outpatient, and this had to be discontinued.      The patient presents today for follow-up.  He recently had some pain in his epigastrium,   and underwent an EGD and colonoscopy.  There was a questionable issue with the   pancreas, although he saw pancreatic specialist at [place], and this evidently showed no   significant pathology.  He has not had any chest discomfort.  He still gets short of breath   with walking significant distances, although this is unchanged.  He has had some right   sided claudication, although his recent GERDA did not change.  It was consistent with mild   disease.  He also had less than 70% stenosis in the celiac artery on a scan.      Past Medical History:   Diagnosis Date   • Anxiety    • Arthritis    • Atrial flutter (CMS/HCC)     Status post cavotricuspid isthmus ablation by Dr. Gustafson on 4/18/17   • Mandel esophagus    • Benign essential hypertension    • CAD (coronary artery disease)     3 vessel CABG 4/11/17 by Dr. Cortez: ROSARIO-prox LAD, SVG-OM1, SVG-OM3   • Carotid artery disease (CMS/HCC)     Status post carotid endarterectomy - USG 4/10/17: 50-59% NICHELLE, 1-15% LICA.    • Colonic polyp    • DDD (degenerative disc disease), lumbosacral    • GERD (gastroesophageal reflux disease)    • H/O bone density study 2013   • H/O complete eye exam 2014   • HLD (hyperlipidemia)     • Hypertension    • Lipid screening 05/31/2013   • Low back pain     physical therapy [facility] [place] rehab 5-12-10   • Screening for prostate cancer 07/07/2015   • Stroke (CMS/HCC)        Past Surgical History:   Procedure Laterality Date   • CARDIAC CATHETERIZATION N/A 4/10/2017    Procedure: Left Heart Cath;  Surgeon: Marjorie Healy MD;  Location:  [facility];  Service:    • CARDIAC CATHETERIZATION N/A 4/10/2017    Procedure: Coronary angiography;  Surgeon: Marjorie Healy MD;  Location:  [facility];  Service:    • CARDIAC CATHETERIZATION N/A 4/10/2017    Procedure: Left ventriculography;  Surgeon: Marjorie Healy MD;  Location:  [facility];  Service:    • CARDIAC ELECTROPHYSIOLOGY PROCEDURE N/A 4/18/2017    Procedure: Ablation atrial flutter;  Surgeon: Jose Antonio Gustafson MD;  Location:  [facility];  Service:    • COLONOSCOPY  01/06/2015    Diverticulosis, one TA   • COLONOSCOPY N/A 2/14/2019    tics, NBIH, adenomatous polyp x 2   • CORONARY ARTERY BYPASS GRAFT N/A 4/11/2017    Procedure: AR STERNOTOMY CORONARY ARTERY BYPASS GRAFT TIMES 3 USING LEFT INTERNAL MAMMARY ARTERY AND LEFT GREATER SAPHENOUS VEIN GRAFT PER ENDOSCOPIC VEIN HARVESTING AND PRP ;  Surgeon: Temo Cortez MD;  Location: [facility] MAIN OR;  Service:    • ENDOSCOPY  01/06/2015    HH, Mandel's esophagus   • ENDOSCOPY N/A 2/14/2019    Z line irregular, HH, Mandel's esophagus   • VASECTOMY         Current Outpatient Medications on File Prior to Visit   Medication Sig Dispense Refill   • albuterol sulfate  (90 Base) MCG/ACT inhaler Inhale 2 puffs Every 4 (Four) Hours As Needed for Wheezing. 1 inhaler 0   • clopidogrel (PLAVIX) 75 MG tablet Take 1 tablet by mouth Daily. 90 tablet 1   • HYDROcodone-Acetaminophen (VICODIN) 5-300 MG per tablet Take 1 tablet by mouth 2 (Two) Times a Day. 60 tablet 0   • nitroglycerin (NITROSTAT) 0.4 MG SL tablet Place 1 tablet under the tongue  Every 5 (Five) Minutes As Needed for Chest Pain. Take no more than 3 doses in 15 minutes. 20 tablet 2   • pantoprazole (PROTONIX) 40 MG EC tablet Take 1 tablet by mouth Daily. 90 tablet 1   • ramipril (ALTACE) 5 MG capsule Take 1 capsule by mouth Daily. 90 capsule 1   • rosuvastatin (CRESTOR) 10 MG tablet Take 1 tablet by mouth Daily. 90 tablet 1     No current facility-administered medications on file prior to visit.      Allergies as of 04/17/2019 - Reviewed 03/29/2019   Allergen Reaction Noted   • Lipitor [atorvastatin] Myalgia 05/23/2018   • Penicillins Hives and Swelling 10/05/2016     Social History     Socioeconomic History   • Marital status:      Spouse name: Not on file   • Number of children: Not on file   • Years of education: Not on file   • Highest education level: Not on file   Tobacco Use   • Smoking status: Former Smoker   • Smokeless tobacco: Never Used   • Tobacco comment: CAFFEINE USE   Substance and Sexual Activity   • Alcohol use: Yes     Comment: SOCIALLY   • Drug use: No   • Sexual activity: Yes     Partners: Female     Family History   Problem Relation Age of Onset   • Heart disease Mother    • Heart attack Mother    • Stroke Mother    • Heart disease Father    • Heart attack Father    • Stroke Father    • Arthritis Other    • Diabetes Other    • Hypertension Other    • Kidney disease Other         stones   • Hypertension Sister    • Heart attack Brother    • Heart disease Brother    • No Known Problems Maternal Grandmother    • No Known Problems Maternal Grandfather    • No Known Problems Paternal Grandmother    • No Known Problems Paternal Grandfather    • No Known Problems Brother    • Heart disease Brother    • Heart attack Brother    • Diabetes Brother        Review of Systems   Cardiovascular: Positive for claudication.   Respiratory: Positive for snoring.    Musculoskeletal: Positive for joint pain.   Gastrointestinal: Positive for abdominal pain.   All other systems reviewed  "and are negative.     Objective:     Vitals:    04/17/19 1047   BP: 118/62   Pulse: 60   SpO2: 98%   Weight: 82.1 kg (181 lb)   Height: 172.7 cm (67.99\")     Body mass index is 27.53 kg/m².    Physical Exam   Constitutional: He is oriented to person, place, and time. He appears well-developed and well-nourished.   HENT:   Head: Normocephalic and atraumatic.   Eyes: Conjunctivae are normal.   Neck: Neck supple.   Cardiovascular: Normal rate and regular rhythm. Exam reveals no gallop and no friction rub.   No murmur heard.  Pulmonary/Chest: Effort normal and breath sounds normal.   Abdominal: Soft. There is no tenderness.   Musculoskeletal: He exhibits no edema.   Neurological: He is alert and oriented to person, place, and time.   Skin: Skin is warm.   Psychiatric: He has a normal mood and affect. His behavior is normal.     Lab Review:   Procedures    Cardiac Procedures:  1.  Echocardiogram on 4/8/2017: The ejection fraction was 55-60%.  The left atrium   was mildly to moderately dilated.  There was mild mitral regurgitation.  2.  Left heart catheterization on 4/10/2017: There was a distal 80% and severely   calcified stenosis in the left main extending into the ostium of left circumflex.  The left   circumflex was dominant, and did have the before mentioned 80% lesion in the ostial   aspect extending from the left main.  The LAD had 20% proximal disease.  The right   coronary artery was small, nondominant, and normal.  3.  Status post 3 vessel coronary artery bypass grafting on 4/11/2017 by Dr. Cortez:   LIMA to proximal LAD, SVG to OM 1, and SVG to OM 3.  4.  Transesophageal echo on 4/17/2017: The ejection fraction was 55%.  There was   moderate left atrial enlargement and mild to moderate right atrial enlargement.  The   atrial septum was redundant, but no PFO was seen on the saline study.  There was   no significant valvular disease.  5.  Status post cavotricuspid isthmus ablation for typical atrial flutter on " 4/18/2017 by Dr. Gustafson.  6.  Carotid Doppler ultrasound on 4/10/2017: There was 50-59% stenosis in the right   internal carotid artery.  There was 1-15% disease in the left internal carotid artery.  7.  Echocardiogram on 10/24/2017: Ejection fraction was 56%.  There was grade 2   diastolic dysfunction.  There was aortic sclerosis without stenosis.       Assessment:       Diagnosis Plan   1. Coronary artery disease involving coronary bypass graft of native heart without angina pectoris     2. Status post catheter ablation of atrial flutter     3. Carotid artery disease, unspecified laterality, unspecified type (CMS/[facility])     4. PVD (peripheral vascular disease) (CMS/[facility])       Plan:       From a cardiovascular standpoint, he seems to be stable.  His claudication symptoms are   still present in the lower extremity, mainly on the right.  However, he is followed by vascular   surgery, and he has mild obstructive disease on his recent GERDA.  He also had less than 70%   disease in the celiac artery.  He will continue on the Plavix indefinitely.  His aspirin was   discontinued as he was having significant bruising with both aspirin and Plavix.  His blood   pressure has been well controlled on the Altace.  He will continue on the Crestor at 10   mg/day.  He has not been on beta-blockers since he developed significant bradycardia   even with minimal doses in the past.  He has not had any recurrent atrial flutter since the   ablation in April 2017.  For now, I will plan on seeing him back in the office in 6 months.    Coronary Artery Disease  Assessment  • The patient has no angina    Plan  • Lifestyle modifications discussed include adhering to a heart healthy diet, avoidance of tobacco products, maintenance of a healthy weight, medication compliance, regular exercise and regular monitoring of cholesterol and blood pressure    Subjective - Objective  • There is a history of previous coronary artery bypass graft on or  around 4/11/2017  • Current antiplatelet therapy includes aspirin 81 mg      Atrial Fibrillation and Atrial Flutter  Assessment  • The patient has paroxysmal atrial flutter  • The patient had an atrial flutter ablation on 4/18/2017.  There has been no recurrence since this, and he is not on anticoagulation.

## 2019-05-06 DIAGNOSIS — M51.37 DDD (DEGENERATIVE DISC DISEASE), LUMBOSACRAL: ICD-10-CM

## 2019-05-09 RX ORDER — HYDROCODONE BITARTRATE AND ACETAMINOPHEN 5; 300 MG/1; MG/1
1 TABLET ORAL 2 TIMES DAILY
Qty: 60 TABLET | Refills: 0 | Status: SHIPPED | OUTPATIENT
Start: 2019-05-09 | End: 2019-06-10 | Stop reason: SDUPTHER

## 2019-05-29 ENCOUNTER — RESULTS ENCOUNTER (OUTPATIENT)
Dept: FAMILY MEDICINE CLINIC | Facility: CLINIC | Age: 74
End: 2019-05-29

## 2019-05-29 DIAGNOSIS — I63.9 CEREBROVASCULAR ACCIDENT (CVA), UNSPECIFIED MECHANISM (HCC): ICD-10-CM

## 2019-05-29 DIAGNOSIS — I73.9 PAD (PERIPHERAL ARTERY DISEASE) (HCC): ICD-10-CM

## 2019-05-29 DIAGNOSIS — I10 BENIGN ESSENTIAL HYPERTENSION: ICD-10-CM

## 2019-05-29 DIAGNOSIS — I25.83 CORONARY ARTERY DISEASE DUE TO LIPID RICH PLAQUE: ICD-10-CM

## 2019-05-29 DIAGNOSIS — I48.3 TYPICAL ATRIAL FLUTTER (HCC): ICD-10-CM

## 2019-05-29 DIAGNOSIS — I25.10 CORONARY ARTERY DISEASE DUE TO LIPID RICH PLAQUE: ICD-10-CM

## 2019-06-10 DIAGNOSIS — M51.37 DDD (DEGENERATIVE DISC DISEASE), LUMBOSACRAL: ICD-10-CM

## 2019-06-10 NOTE — TELEPHONE ENCOUNTER
Pt called asking for refill for hydrocodone.  Last appt 03/29/2019.  Appt cancelled 04/03/2019.  Last refill 05/06/2019.  Please advise.

## 2019-06-14 RX ORDER — HYDROCODONE BITARTRATE AND ACETAMINOPHEN 5; 300 MG/1; MG/1
1 TABLET ORAL 2 TIMES DAILY
Qty: 60 TABLET | Refills: 0 | Status: SHIPPED | OUTPATIENT
Start: 2019-06-14 | End: 2019-07-16 | Stop reason: SDUPTHER

## 2019-06-24 ENCOUNTER — HOSPITAL ENCOUNTER (EMERGENCY)
Facility: HOSPITAL | Age: 74
Discharge: HOME | End: 2019-06-24
Attending: EMERGENCY MEDICINE | Admitting: EMERGENCY MEDICINE

## 2019-06-24 ENCOUNTER — DOCUMENTATION (OUTPATIENT)
Dept: FAMILY MEDICINE CLINIC | Facility: CLINIC | Age: 74
End: 2019-06-24

## 2019-06-24 VITALS
DIASTOLIC BLOOD PRESSURE: 77 MMHG | HEIGHT: 68 IN | WEIGHT: 176.5 LBS | RESPIRATION RATE: 16 BRPM | SYSTOLIC BLOOD PRESSURE: 145 MMHG | BODY MASS INDEX: 26.75 KG/M2 | TEMPERATURE: 97.6 F | OXYGEN SATURATION: 94 % | HEART RATE: 61 BPM

## 2019-06-24 DIAGNOSIS — E87.5 HIGH SERUM POTASSIUM LEVEL: Primary | ICD-10-CM

## 2019-06-24 LAB
ALBUMIN SERPL-MCNC: 4 G/DL (ref 3.5–5.2)
ALBUMIN/GLOB SERPL: 1.3 G/DL
ALP SERPL-CCNC: 90 U/L (ref 39–117)
ALT SERPL W P-5'-P-CCNC: 20 U/L (ref 1–41)
ANION GAP SERPL CALCULATED.3IONS-SCNC: 13.1 MMOL/L
AST SERPL-CCNC: 27 U/L (ref 1–40)
BASOPHILS # BLD AUTO: 0.02 10*3/MM3 (ref 0–0.2)
BASOPHILS NFR BLD AUTO: 0.3 % (ref 0–1.5)
BILIRUB SERPL-MCNC: 0.7 MG/DL (ref 0.2–1.2)
BUN BLD-MCNC: 17 MG/DL (ref 8–23)
BUN/CREAT SERPL: 16.7 (ref 7–25)
CALCIUM SPEC-SCNC: 9.7 MG/DL (ref 8.6–10.5)
CHLORIDE SERPL-SCNC: 103 MMOL/L (ref 98–107)
CO2 SERPL-SCNC: 22.9 MMOL/L (ref 22–29)
CREAT BLD-MCNC: 1.02 MG/DL (ref 0.76–1.27)
DEPRECATED RDW RBC AUTO: 50 FL (ref 37–54)
EOSINOPHIL # BLD AUTO: 0.05 10*3/MM3 (ref 0–0.4)
EOSINOPHIL NFR BLD AUTO: 0.6 % (ref 0.3–6.2)
ERYTHROCYTE [DISTWIDTH] IN BLOOD BY AUTOMATED COUNT: 15.3 % (ref 12.3–15.4)
GFR SERPL CREATININE-BSD FRML MDRD: 71 ML/MIN/1.73
GLOBULIN UR ELPH-MCNC: 3 GM/DL
GLUCOSE BLD-MCNC: 101 MG/DL (ref 65–99)
HCT VFR BLD AUTO: 49.8 % (ref 37.5–51)
HGB BLD-MCNC: 16 G/DL (ref 13–17.7)
HOLD SPECIMEN: NORMAL
HOLD SPECIMEN: NORMAL
IMM GRANULOCYTES # BLD AUTO: 0.15 10*3/MM3 (ref 0–0.05)
IMM GRANULOCYTES NFR BLD AUTO: 1.9 % (ref 0–0.5)
LYMPHOCYTES # BLD AUTO: 1.46 10*3/MM3 (ref 0.7–3.1)
LYMPHOCYTES NFR BLD AUTO: 18.8 % (ref 19.6–45.3)
MCH RBC QN AUTO: 29.3 PG (ref 26.6–33)
MCHC RBC AUTO-ENTMCNC: 32.1 G/DL (ref 31.5–35.7)
MCV RBC AUTO: 91.2 FL (ref 79–97)
MONOCYTES # BLD AUTO: 0.97 10*3/MM3 (ref 0.1–0.9)
MONOCYTES NFR BLD AUTO: 12.5 % (ref 5–12)
NEUTROPHILS # BLD AUTO: 5.13 10*3/MM3 (ref 1.7–7)
NEUTROPHILS NFR BLD AUTO: 65.9 % (ref 42.7–76)
NRBC BLD AUTO-RTO: 0 /100 WBC (ref 0–0.2)
PLATELET # BLD AUTO: 388 10*3/MM3 (ref 140–450)
PMV BLD AUTO: 10.1 FL (ref 6–12)
POTASSIUM BLD-SCNC: 3.9 MMOL/L (ref 3.5–5.2)
PROT SERPL-MCNC: 7 G/DL (ref 6–8.5)
RBC # BLD AUTO: 5.46 10*6/MM3 (ref 4.14–5.8)
SODIUM BLD-SCNC: 139 MMOL/L (ref 136–145)
WBC NRBC COR # BLD: 7.78 10*3/MM3 (ref 3.4–10.8)
WHOLE BLOOD HOLD SPECIMEN: NORMAL
WHOLE BLOOD HOLD SPECIMEN: NORMAL

## 2019-06-24 PROCEDURE — 85025 COMPLETE CBC W/AUTO DIFF WBC: CPT

## 2019-06-24 PROCEDURE — 93005 ELECTROCARDIOGRAM TRACING: CPT

## 2019-06-24 PROCEDURE — 80053 COMPREHEN METABOLIC PANEL: CPT | Performed by: EMERGENCY MEDICINE

## 2019-06-24 PROCEDURE — 93005 ELECTROCARDIOGRAM TRACING: CPT | Performed by: EMERGENCY MEDICINE

## 2019-06-24 PROCEDURE — 99283 EMERGENCY DEPT VISIT LOW MDM: CPT

## 2019-06-24 NOTE — PROGRESS NOTES
I received a call from laboratory with a critical hyperkalemia lab result.   stated that this was a nonhemolyzed specimen.  I then called the patient and advised him to go over to [facility] emergency room for reevaluation this evening.  Currently he is not having symptoms.

## 2019-06-25 LAB
ALBUMIN SERPL-MCNC: 4.3 G/DL (ref 3.5–5.2)
ALBUMIN/GLOB SERPL: 1.6 G/DL
ALP SERPL-CCNC: 100 U/L (ref 39–117)
ALT SERPL-CCNC: 20 U/L (ref 1–41)
AST SERPL-CCNC: 22 U/L (ref 1–40)
BASOPHILS # BLD AUTO: 0.04 10*3/MM3 (ref 0–0.2)
BASOPHILS NFR BLD AUTO: 0.6 % (ref 0–1.5)
BILIRUB SERPL-MCNC: 0.7 MG/DL (ref 0.2–1.2)
BUN SERPL-MCNC: 16 MG/DL (ref 8–23)
BUN/CREAT SERPL: 14.3 (ref 7–25)
CALCIUM SERPL-MCNC: 9.9 MG/DL (ref 8.6–10.5)
CHLORIDE SERPL-SCNC: 102 MMOL/L (ref 98–107)
CHOLEST SERPL-MCNC: 144 MG/DL (ref 0–200)
CO2 SERPL-SCNC: 28 MMOL/L (ref 22–29)
CREAT SERPL-MCNC: 1.12 MG/DL (ref 0.76–1.27)
EOSINOPHIL # BLD AUTO: 0.08 10*3/MM3 (ref 0–0.4)
EOSINOPHIL NFR BLD AUTO: 1.2 % (ref 0.3–6.2)
ERYTHROCYTE [DISTWIDTH] IN BLOOD BY AUTOMATED COUNT: 15.8 % (ref 12.3–15.4)
GLOBULIN SER CALC-MCNC: 2.7 GM/DL
GLUCOSE SERPL-MCNC: 136 MG/DL (ref 65–99)
HCT VFR BLD AUTO: 54.1 % (ref 37.5–51)
HDLC SERPL-MCNC: 54 MG/DL (ref 40–60)
HGB BLD-MCNC: 16.9 G/DL (ref 13–17.7)
IMM GRANULOCYTES # BLD AUTO: 0.2 10*3/MM3 (ref 0–0.05)
IMM GRANULOCYTES NFR BLD AUTO: 3 % (ref 0–0.5)
LDLC SERPL CALC-MCNC: 73 MG/DL (ref 0–100)
LYMPHOCYTES # BLD AUTO: 1.49 10*3/MM3 (ref 0.7–3.1)
LYMPHOCYTES NFR BLD AUTO: 22.4 % (ref 19.6–45.3)
MCH RBC QN AUTO: 29 PG (ref 26.6–33)
MCHC RBC AUTO-ENTMCNC: 31.2 G/DL (ref 31.5–35.7)
MCV RBC AUTO: 92.8 FL (ref 79–97)
MONOCYTES # BLD AUTO: 0.83 10*3/MM3 (ref 0.1–0.9)
MONOCYTES NFR BLD AUTO: 12.5 % (ref 5–12)
NEUTROPHILS # BLD AUTO: 4.01 10*3/MM3 (ref 1.7–7)
NEUTROPHILS NFR BLD AUTO: 60.3 % (ref 42.7–76)
NRBC BLD AUTO-RTO: 0 /100 WBC (ref 0–0.2)
PLATELET # BLD AUTO: 382 10*3/MM3 (ref 140–450)
POTASSIUM SERPL-SCNC: 6.1 MMOL/L (ref 3.5–5.2)
PROT SERPL-MCNC: 7 G/DL (ref 6–8.5)
RBC # BLD AUTO: 5.83 10*6/MM3 (ref 4.14–5.8)
SODIUM SERPL-SCNC: 141 MMOL/L (ref 136–145)
TRIGL SERPL-MCNC: 86 MG/DL (ref 0–150)
TSH SERPL DL<=0.005 MIU/L-ACNC: 3.26 MIU/ML (ref 0.27–4.2)
VLDLC SERPL CALC-MCNC: 17.2 MG/DL
WBC # BLD AUTO: 6.65 10*3/MM3 (ref 3.4–10.8)

## 2019-06-28 ENCOUNTER — TELEPHONE (OUTPATIENT)
Dept: FAMILY MEDICINE CLINIC | Facility: CLINIC | Age: 74
End: 2019-06-28

## 2019-06-28 ENCOUNTER — OFFICE VISIT (OUTPATIENT)
Dept: FAMILY MEDICINE CLINIC | Facility: CLINIC | Age: 74
End: 2019-06-28

## 2019-06-28 VITALS
RESPIRATION RATE: 16 BRPM | WEIGHT: 177 LBS | BODY MASS INDEX: 26.83 KG/M2 | HEIGHT: 68 IN | OXYGEN SATURATION: 98 % | TEMPERATURE: 98 F | SYSTOLIC BLOOD PRESSURE: 155 MMHG | DIASTOLIC BLOOD PRESSURE: 75 MMHG | HEART RATE: 51 BPM

## 2019-06-28 DIAGNOSIS — R35.0 FREQUENCY OF MICTURITION: Primary | ICD-10-CM

## 2019-06-28 DIAGNOSIS — E78.5 HYPERLIPIDEMIA, UNSPECIFIED HYPERLIPIDEMIA TYPE: ICD-10-CM

## 2019-06-28 DIAGNOSIS — I10 BENIGN ESSENTIAL HYPERTENSION: ICD-10-CM

## 2019-06-28 PROCEDURE — 99214 OFFICE O/P EST MOD 30 MIN: CPT | Performed by: FAMILY MEDICINE

## 2019-06-28 NOTE — TELEPHONE ENCOUNTER
Patient was told by Dr. Chen to take his BP once he got home after his OV 6/28/2019 and call back with readings.  Pt called and stated that his BP was 163 75

## 2019-06-28 NOTE — PROGRESS NOTES
Subjective   Chief Complaint:   Chief Complaint   Patient presents with   • Hypertension   • Hyperlipidemia   • Heartburn         History of Present Illness comes in today to get his labs rechecked.  He had a CMP on Monday he was called by Dr. Soriano of the results he had a potassium of 6.1 on his CMP Dr. Soriano sent him to the emergency room they saw him in the emergency room and repeated his lab work and his potassium was 3.9 the same day.  That was a lab error.  And his repeat potassium that same day was 3.9.  Started 3 months with bladder leaking and he is got symptoms of frequency.  At times it hits he was going to run to urinate quickly somata send him over and get a urine with a micro and a CNS and having come back in a month.  I want him to bring his log of his  blood pressures and blood pressure cuff.  I meant to do a urine with a micro and a urine C&S for this frequency that he has.  All of his labs.  His blood pressure by me was 160/72.  And then I got 150/72 but at home he says he gets 130/71 so again I want him to bring his blood pressure cuff and and call me his blood pressure from home today.  Reviewed his labs in detail            Madhu Santoro 73 y.o. male who presents today for Medical Management of the below listed issues and medication refills.       he has a problem list of   Patient Active Problem List   Diagnosis   • Anxiety   • Arthritis   • Chronic coronary artery disease   • HLD (hyperlipidemia)   • Benign essential hypertension   • DDD (degenerative disc disease), lumbosacral   • Cerebrovascular accident (CMS/HCC)   • Encounter for screening for cardiovascular disorders   • Gastroesophageal reflux disease   • Hyperlipidemia   • Stenosis of carotid artery   • Former smoker   • History of cardiac catheterization   • S/P ablation of atrial flutter   • Typical atrial flutter (CMS/HCC)   • S/P CABG (coronary artery bypass graft)   • Adenomatous polyp of ascending colon   • Abdominal bloating  "  • Stroke (CMS/HCC)   • PAD (peripheral artery disease) (CMS/[facility])   .  Since the last visit, he has overall felt well.  he has been compliant with   Current Outpatient Medications:   •  albuterol sulfate  (90 Base) MCG/ACT inhaler, Inhale 2 puffs Every 4 (Four) Hours As Needed for Wheezing., Disp: 1 inhaler, Rfl: 0  •  clopidogrel (PLAVIX) 75 MG tablet, Take 1 tablet by mouth Daily., Disp: 90 tablet, Rfl: 1  •  HYDROcodone-Acetaminophen (VICODIN) 5-300 MG per tablet, Take 1 tablet by mouth 2 (Two) Times a Day., Disp: 60 tablet, Rfl: 0  •  nitroglycerin (NITROSTAT) 0.4 MG SL tablet, Place 1 tablet under the tongue Every 5 (Five) Minutes As Needed for Chest Pain. Take no more than 3 doses in 15 minutes., Disp: 20 tablet, Rfl: 2  •  pantoprazole (PROTONIX) 40 MG EC tablet, Take 1 tablet by mouth Daily., Disp: 90 tablet, Rfl: 1  •  ramipril (ALTACE) 5 MG capsule, Take 1 capsule by mouth Daily., Disp: 90 capsule, Rfl: 1  •  rosuvastatin (CRESTOR) 10 MG tablet, Take 1 tablet by mouth Daily., Disp: 90 tablet, Rfl: 1.  he denies medication side effects.    All of the chronic condition(s) listed above are stable w/o issues.    /75   Pulse 51   Temp 98 °F (36.7 °C)   Resp 16   Ht 172.7 cm (68\")   Wt 80.3 kg (177 lb)   SpO2 98%   BMI 26.91 kg/m²     Results for orders placed or performed during the hospital encounter of 06/24/19   Comprehensive Metabolic Panel   Result Value Ref Range    Glucose 101 (H) 65 - 99 mg/dL    BUN 17 8 - 23 mg/dL    Creatinine 1.02 0.76 - 1.27 mg/dL    Sodium 139 136 - 145 mmol/L    Potassium 3.9 3.5 - 5.2 mmol/L    Chloride 103 98 - 107 mmol/L    CO2 22.9 22.0 - 29.0 mmol/L    Calcium 9.7 8.6 - 10.5 mg/dL    Total Protein 7.0 6.0 - 8.5 g/dL    Albumin 4.00 3.50 - 5.20 g/dL    ALT (SGPT) 20 1 - 41 U/L    AST (SGOT) 27 1 - 40 U/L    Alkaline Phosphatase 90 39 - 117 U/L    Total Bilirubin 0.7 0.2 - 1.2 mg/dL    eGFR Non African Amer 71 >60 mL/min/1.73    Globulin 3.0 gm/dL    A/G " Ratio 1.3 g/dL    BUN/Creatinine Ratio 16.7 7.0 - 25.0    Anion Gap 13.1 mmol/L   CBC Auto Differential   Result Value Ref Range    WBC 7.78 3.40 - 10.80 10*3/mm3    RBC 5.46 4.14 - 5.80 10*6/mm3    Hemoglobin 16.0 13.0 - 17.7 g/dL    Hematocrit 49.8 37.5 - 51.0 %    MCV 91.2 79.0 - 97.0 fL    MCH 29.3 26.6 - 33.0 pg    MCHC 32.1 31.5 - 35.7 g/dL    RDW 15.3 12.3 - 15.4 %    RDW-SD 50.0 37.0 - 54.0 fl    MPV 10.1 6.0 - 12.0 fL    Platelets 388 140 - 450 10*3/mm3    Neutrophil % 65.9 42.7 - 76.0 %    Lymphocyte % 18.8 (L) 19.6 - 45.3 %    Monocyte % 12.5 (H) 5.0 - 12.0 %    Eosinophil % 0.6 0.3 - 6.2 %    Basophil % 0.3 0.0 - 1.5 %    Immature Grans % 1.9 (H) 0.0 - 0.5 %    Neutrophils, Absolute 5.13 1.70 - 7.00 10*3/mm3    Lymphocytes, Absolute 1.46 0.70 - 3.10 10*3/mm3    Monocytes, Absolute 0.97 (H) 0.10 - 0.90 10*3/mm3    Eosinophils, Absolute 0.05 0.00 - 0.40 10*3/mm3    Basophils, Absolute 0.02 0.00 - 0.20 10*3/mm3    Immature Grans, Absolute 0.15 (H) 0.00 - 0.05 10*3/mm3    nRBC 0.0 0.0 - 0.2 /100 WBC   Light Blue Top   Result Value Ref Range    Extra Tube hold for add-on    Green Top (Gel)   Result Value Ref Range    Extra Tube Hold for add-ons.    Lavender Top   Result Value Ref Range    Extra Tube hold for add-on    Gold Top - SST   Result Value Ref Range    Extra Tube Hold for add-ons.              The following portions of the patient's history were reviewed and updated as appropriate: allergies, current medications, past family history, past medical history, past social history, past surgical history and problem list.    Review of Systems   Constitutional: Negative for activity change, appetite change and unexpected weight change.   Eyes: Negative for visual disturbance.   Respiratory: Negative for chest tightness and shortness of breath.    Cardiovascular: Negative for chest pain and palpitations.   Genitourinary: Positive for frequency. Negative for dysuria.   Skin: Negative for color change.    Neurological: Negative for syncope and speech difficulty.   Psychiatric/Behavioral: Negative for confusion and decreased concentration.       Objective   Physical Exam   Constitutional: He is oriented to person, place, and time. He appears well-developed and well-nourished.   HENT:   Head: Atraumatic.   Mouth/Throat: Oropharynx is clear and moist.   Eyes: EOM are normal. Pupils are equal, round, and reactive to light.   Neck: Normal range of motion. Neck supple. No thyromegaly present.   Cardiovascular: Normal rate and regular rhythm.   Pulmonary/Chest: Effort normal and breath sounds normal.   Abdominal: Soft. There is no tenderness.   Musculoskeletal: Normal range of motion.   Neurological: He is alert and oriented to person, place, and time.   Skin: Skin is warm and dry.   Psychiatric: He has a normal mood and affect. His behavior is normal.   Nursing note and vitals reviewed.      Assessment/Plan   There are no diagnoses linked to this encounter.

## 2019-06-28 NOTE — PATIENT INSTRUCTIONS
Exercise 30 minutes most days of the week  Sleep 6-8 hours each night if possible  Low fat, low cholesterol diet   we discussed prescribed medications and how to take them   make sure you get results of any labs/studies ordered today  Low glycemic index diet   Call me back your blood pressure today do the urine and the micro and culture and sensitivity today and I will call you results next week

## 2019-06-30 LAB
APPEARANCE UR: CLEAR
BACTERIA #/AREA URNS HPF: NORMAL /HPF
BACTERIA UR CULT: NO GROWTH
BACTERIA UR CULT: NORMAL
BILIRUB UR QL STRIP: NEGATIVE
CASTS URNS MICRO: NORMAL
COLOR UR: YELLOW
EPI CELLS #/AREA URNS HPF: NORMAL /HPF
GLUCOSE UR QL: NEGATIVE
HGB UR QL STRIP: NEGATIVE
KETONES UR QL STRIP: NEGATIVE
LEUKOCYTE ESTERASE UR QL STRIP: NEGATIVE
NITRITE UR QL STRIP: NEGATIVE
PH UR STRIP: 6.5 [PH] (ref 5–8)
PROT UR QL STRIP: NEGATIVE
RBC #/AREA URNS HPF: NORMAL /HPF
SP GR UR: 1.02 (ref 1–1.03)
UROBILINOGEN UR STRIP-MCNC: (no result) MG/DL
WBC #/AREA URNS HPF: NORMAL /HPF

## 2019-07-16 DIAGNOSIS — M51.37 DDD (DEGENERATIVE DISC DISEASE), LUMBOSACRAL: ICD-10-CM

## 2019-07-16 RX ORDER — HYDROCODONE BITARTRATE AND ACETAMINOPHEN 5; 300 MG/1; MG/1
1 TABLET ORAL 2 TIMES DAILY
Qty: 60 TABLET | Refills: 0 | Status: SHIPPED | OUTPATIENT
Start: 2019-07-16 | End: 2019-08-16 | Stop reason: SDUPTHER

## 2019-07-16 NOTE — TELEPHONE ENCOUNTER
Pt called requesting refill hydrocodone. Last OV 06/28/2019.  Dr. Tsai is on vacation - please advise.

## 2019-08-02 ENCOUNTER — OFFICE VISIT (OUTPATIENT)
Dept: FAMILY MEDICINE CLINIC | Facility: CLINIC | Age: 74
End: 2019-08-02

## 2019-08-02 VITALS
RESPIRATION RATE: 16 BRPM | OXYGEN SATURATION: 98 % | SYSTOLIC BLOOD PRESSURE: 144 MMHG | DIASTOLIC BLOOD PRESSURE: 76 MMHG | TEMPERATURE: 97.8 F | HEIGHT: 68 IN | BODY MASS INDEX: 26.98 KG/M2 | HEART RATE: 45 BPM | WEIGHT: 178 LBS

## 2019-08-02 DIAGNOSIS — I10 BENIGN ESSENTIAL HYPERTENSION: ICD-10-CM

## 2019-08-02 DIAGNOSIS — I63.9 CEREBROVASCULAR ACCIDENT (CVA), UNSPECIFIED MECHANISM (HCC): ICD-10-CM

## 2019-08-02 DIAGNOSIS — R39.15 URGENCY OF URINATION: Primary | ICD-10-CM

## 2019-08-02 PROCEDURE — 99214 OFFICE O/P EST MOD 30 MIN: CPT | Performed by: FAMILY MEDICINE

## 2019-08-02 NOTE — PROGRESS NOTES
Subjective   Chief Complaint:   Chief Complaint   Patient presents with   • Hyperlipidemia   • Hypertension         History of Present Illness   comes in today to discuss his blood pressure and to go over his urine test results.  Previously on 628 he had had a CMP that I ordered that showed a potassium of 6.1 Dr. Soriano sent him to the emergency room that night and his potassium was normal at 3.9 so there was obviously a lab error.  He has complaints that time of frequency of urination and having to are dealing with urgency.  His urine with a micro and his urine C&S were negative.  His repeat potassium again was 3.9 his urine results again including the culture negative.  His PSA was okay less than a year ago.  He said he could not do a prostate exam today because he has been having diarrhea.  Also checked his blood pressure and his brought his cuff in from home his blood pressure was 150/70.  And that is exactly what I got when I checked his blood pressure 150/70 and his pulse is 52 his abdomen is soft negative no CVA tenderness I am going to refer him to Dr. Rodriguez for consult about urgency of urination.            aMdhu Santoro 73 y.o. male who presents today for Medical Management of the below listed issues and medication refills.    ICD-10-CM ICD-9-CM   1. Urgency of urination R39.15 788.63   2. Cerebrovascular accident (CVA), unspecified mechanism (CMS/HCC) I63.9 434.91   3. Benign essential hypertension I10 401.1        he has a problem list of   Patient Active Problem List   Diagnosis   • Anxiety   • Arthritis   • Chronic coronary artery disease   • HLD (hyperlipidemia)   • Benign essential hypertension   • DDD (degenerative disc disease), lumbosacral   • Cerebrovascular accident (CMS/HCC)   • Encounter for screening for cardiovascular disorders   • Gastroesophageal reflux disease   • Hyperlipidemia   • Stenosis of carotid artery   • Former smoker   • History of cardiac catheterization   • S/P ablation of  "atrial flutter   • Typical atrial flutter (CMS/HCC)   • S/P CABG (coronary artery bypass graft)   • Adenomatous polyp of ascending colon   • Abdominal bloating   • Stroke (CMS/HCC)   • PAD (peripheral artery disease) (CMS/HCC)   .  Since the last visit, he has overall felt well.  he has been compliant with   Current Outpatient Medications:   •  albuterol sulfate  (90 Base) MCG/ACT inhaler, Inhale 2 puffs Every 4 (Four) Hours As Needed for Wheezing., Disp: 1 inhaler, Rfl: 0  •  clopidogrel (PLAVIX) 75 MG tablet, Take 1 tablet by mouth Daily., Disp: 90 tablet, Rfl: 1  •  HYDROcodone-Acetaminophen (VICODIN) 5-300 MG per tablet, Take 1 tablet by mouth 2 (Two) Times a Day., Disp: 60 tablet, Rfl: 0  •  nitroglycerin (NITROSTAT) 0.4 MG SL tablet, Place 1 tablet under the tongue Every 5 (Five) Minutes As Needed for Chest Pain. Take no more than 3 doses in 15 minutes., Disp: 20 tablet, Rfl: 2  •  pantoprazole (PROTONIX) 40 MG EC tablet, Take 1 tablet by mouth Daily., Disp: 90 tablet, Rfl: 1  •  ramipril (ALTACE) 5 MG capsule, Take 1 capsule by mouth Daily., Disp: 90 capsule, Rfl: 1  •  rosuvastatin (CRESTOR) 10 MG tablet, Take 1 tablet by mouth Daily., Disp: 90 tablet, Rfl: 1.  he denies medication side effects.    All of the chronic condition(s) listed above are stable w/o issues.    /76   Pulse (!) 45   Temp 97.8 °F (36.6 °C)   Resp 16   Ht 172.7 cm (68\")   Wt 80.7 kg (178 lb)   SpO2 98%   BMI 27.06 kg/m²     Results for orders placed or performed in visit on 06/28/19   Urine Culture - Urine, Urine, Random Void   Result Value Ref Range    Urine Culture Final report     Result 1 No growth    Microscopic Examination -   Result Value Ref Range    WBC, UA 0-2 /HPF    RBC, UA 0-2 /HPF    Epithelial Cells (non renal) 0-2 /HPF    Cast Type Comment     Bacteria, UA Comment None Seen /HPF   Urinalysis With Microscopic - Urine, Clean Catch   Result Value Ref Range    Specific Gravity, UA 1.023 1.005 - 1.030    " pH, UA 6.5 5.0 - 8.0    Color, UA Yellow     Appearance, UA Clear Clear    Leukocytes, UA Negative Negative    Protein Negative Negative    Glucose, UA Negative Negative    Ketones Negative Negative    Blood, UA Negative Negative    Bilirubin, UA Negative Negative    Urobilinogen, UA Comment     Nitrite, UA Negative Negative             The following portions of the patient's history were reviewed and updated as appropriate: allergies, current medications, past family history, past medical history, past social history, past surgical history and problem list.    Review of Systems   Constitutional: Negative for activity change, appetite change, fatigue and unexpected weight change.   HENT: Negative for hearing loss and sneezing.    Eyes: Negative for visual disturbance.   Respiratory: Negative for chest tightness and shortness of breath.    Cardiovascular: Negative for chest pain and palpitations.   Gastrointestinal: Negative for abdominal distention, abdominal pain, rectal pain and vomiting.   Endocrine: Negative for polyuria.   Genitourinary: Negative for difficulty urinating, dysuria, frequency, hematuria and urgency.   Skin: Negative for color change.   Neurological: Negative for syncope and speech difficulty.   Psychiatric/Behavioral: Negative for confusion and decreased concentration.       Objective   Physical Exam   Constitutional: He is oriented to person, place, and time. He appears well-developed and well-nourished.   HENT:   Mouth/Throat: Oropharynx is clear and moist.   Eyes: EOM are normal. Pupils are equal, round, and reactive to light.   Neck: Normal range of motion. Neck supple. No thyromegaly present.   Cardiovascular: Normal rate and regular rhythm.   Pulmonary/Chest: Effort normal and breath sounds normal.   Abdominal: Soft. He exhibits no distension. There is no tenderness.   Musculoskeletal: Normal range of motion.   Neurological: He is alert and oriented to person, place, and time.   Skin: Skin  is warm and dry.   Psychiatric: He has a normal mood and affect. His behavior is normal.   Nursing note and vitals reviewed.      Assessment/Plan   Madhu was seen today for hyperlipidemia and hypertension.    Diagnoses and all orders for this visit:    Urgency of urination  -     Ambulatory Referral to Urology    Cerebrovascular accident (CVA), unspecified mechanism (CMS/HCC)  -     Ambulatory Referral to Urology    Benign essential hypertension  -     Ambulatory Referral to Urology

## 2019-08-16 DIAGNOSIS — M51.37 DDD (DEGENERATIVE DISC DISEASE), LUMBOSACRAL: ICD-10-CM

## 2019-08-17 RX ORDER — HYDROCODONE BITARTRATE AND ACETAMINOPHEN 5; 300 MG/1; MG/1
1 TABLET ORAL 2 TIMES DAILY
Qty: 60 TABLET | Refills: 0 | Status: SHIPPED | OUTPATIENT
Start: 2019-08-17 | End: 2019-09-16 | Stop reason: SDUPTHER

## 2019-09-06 ENCOUNTER — HOSPITAL ENCOUNTER (INPATIENT)
Facility: HOSPITAL | Age: 74
LOS: 2 days | Discharge: HOME | End: 2019-09-08
Attending: EMERGENCY MEDICINE | Admitting: INTERNAL MEDICINE

## 2019-09-06 ENCOUNTER — APPOINTMENT (OUTPATIENT)
Dept: GENERAL RADIOLOGY | Facility: HOSPITAL | Age: 74
End: 2019-09-06

## 2019-09-06 ENCOUNTER — ANESTHESIA EVENT (OUTPATIENT)
Dept: PERIOP | Facility: HOSPITAL | Age: 74
End: 2019-09-06

## 2019-09-06 ENCOUNTER — APPOINTMENT (OUTPATIENT)
Dept: ULTRASOUND IMAGING | Facility: HOSPITAL | Age: 74
End: 2019-09-06

## 2019-09-06 ENCOUNTER — APPOINTMENT (OUTPATIENT)
Dept: CT IMAGING | Facility: HOSPITAL | Age: 74
End: 2019-09-06

## 2019-09-06 DIAGNOSIS — K81.0 ACUTE CHOLECYSTITIS: Primary | ICD-10-CM

## 2019-09-06 PROBLEM — R10.11 RIGHT UPPER QUADRANT ABDOMINAL PAIN: Status: ACTIVE | Noted: 2019-09-06

## 2019-09-06 LAB
ALBUMIN SERPL-MCNC: 4.2 G/DL (ref 3.5–5.2)
ALBUMIN/GLOB SERPL: 1.5 G/DL
ALP SERPL-CCNC: 99 U/L (ref 39–117)
ALT SERPL W P-5'-P-CCNC: 23 U/L (ref 1–41)
ANION GAP SERPL CALCULATED.3IONS-SCNC: 13.6 MMOL/L (ref 5–15)
AST SERPL-CCNC: 35 U/L (ref 1–40)
BASOPHILS # BLD AUTO: 0.02 10*3/MM3 (ref 0–0.2)
BASOPHILS NFR BLD AUTO: 0.2 % (ref 0–1.5)
BILIRUB SERPL-MCNC: 1.7 MG/DL (ref 0.2–1.2)
BUN BLD-MCNC: 14 MG/DL (ref 8–23)
BUN/CREAT SERPL: 14 (ref 7–25)
CALCIUM SPEC-SCNC: 9.4 MG/DL (ref 8.6–10.5)
CHLORIDE SERPL-SCNC: 99 MMOL/L (ref 98–107)
CO2 SERPL-SCNC: 25.4 MMOL/L (ref 22–29)
CREAT BLD-MCNC: 1 MG/DL (ref 0.76–1.27)
DEPRECATED RDW RBC AUTO: 50.2 FL (ref 37–54)
EOSINOPHIL # BLD AUTO: 0 10*3/MM3 (ref 0–0.4)
EOSINOPHIL NFR BLD AUTO: 0 % (ref 0.3–6.2)
ERYTHROCYTE [DISTWIDTH] IN BLOOD BY AUTOMATED COUNT: 14.6 % (ref 12.3–15.4)
GFR SERPL CREATININE-BSD FRML MDRD: 73 ML/MIN/1.73
GLOBULIN UR ELPH-MCNC: 2.8 GM/DL
GLUCOSE BLD-MCNC: 99 MG/DL (ref 65–99)
HCT VFR BLD AUTO: 50.8 % (ref 37.5–51)
HGB BLD-MCNC: 16.4 G/DL (ref 13–17.7)
HOLD SPECIMEN: NORMAL
HOLD SPECIMEN: NORMAL
IMM GRANULOCYTES # BLD AUTO: 0.11 10*3/MM3 (ref 0–0.05)
IMM GRANULOCYTES NFR BLD AUTO: 1.1 % (ref 0–0.5)
LIPASE SERPL-CCNC: 12 U/L (ref 13–60)
LYMPHOCYTES # BLD AUTO: 0.91 10*3/MM3 (ref 0.7–3.1)
LYMPHOCYTES NFR BLD AUTO: 9 % (ref 19.6–45.3)
MCH RBC QN AUTO: 29.9 PG (ref 26.6–33)
MCHC RBC AUTO-ENTMCNC: 32.3 G/DL (ref 31.5–35.7)
MCV RBC AUTO: 92.5 FL (ref 79–97)
MONOCYTES # BLD AUTO: 1.35 10*3/MM3 (ref 0.1–0.9)
MONOCYTES NFR BLD AUTO: 13.4 % (ref 5–12)
NEUTROPHILS # BLD AUTO: 7.69 10*3/MM3 (ref 1.7–7)
NEUTROPHILS NFR BLD AUTO: 76.3 % (ref 42.7–76)
NRBC BLD AUTO-RTO: 0 /100 WBC (ref 0–0.2)
PLATELET # BLD AUTO: 345 10*3/MM3 (ref 140–450)
PMV BLD AUTO: 9.9 FL (ref 6–12)
POTASSIUM BLD-SCNC: 4.5 MMOL/L (ref 3.5–5.2)
PROT SERPL-MCNC: 7 G/DL (ref 6–8.5)
RBC # BLD AUTO: 5.49 10*6/MM3 (ref 4.14–5.8)
SODIUM BLD-SCNC: 138 MMOL/L (ref 136–145)
TROPONIN T SERPL-MCNC: <0.01 NG/ML (ref 0–0.03)
TROPONIN T SERPL-MCNC: <0.01 NG/ML (ref 0–0.03)
WBC NRBC COR # BLD: 10.08 10*3/MM3 (ref 3.4–10.8)
WHOLE BLOOD HOLD SPECIMEN: NORMAL
WHOLE BLOOD HOLD SPECIMEN: NORMAL

## 2019-09-06 PROCEDURE — 74177 CT ABD & PELVIS W/CONTRAST: CPT

## 2019-09-06 PROCEDURE — 71046 X-RAY EXAM CHEST 2 VIEWS: CPT

## 2019-09-06 PROCEDURE — 93005 ELECTROCARDIOGRAM TRACING: CPT

## 2019-09-06 PROCEDURE — 25010000002 IOPAMIDOL 61 % SOLUTION: Performed by: EMERGENCY MEDICINE

## 2019-09-06 PROCEDURE — 99285 EMERGENCY DEPT VISIT HI MDM: CPT

## 2019-09-06 PROCEDURE — 80053 COMPREHEN METABOLIC PANEL: CPT | Performed by: EMERGENCY MEDICINE

## 2019-09-06 PROCEDURE — 84484 ASSAY OF TROPONIN QUANT: CPT | Performed by: EMERGENCY MEDICINE

## 2019-09-06 PROCEDURE — 85025 COMPLETE CBC W/AUTO DIFF WBC: CPT | Performed by: PHYSICIAN ASSISTANT

## 2019-09-06 PROCEDURE — 93005 ELECTROCARDIOGRAM TRACING: CPT | Performed by: EMERGENCY MEDICINE

## 2019-09-06 PROCEDURE — 93005 ELECTROCARDIOGRAM TRACING: CPT | Performed by: PHYSICIAN ASSISTANT

## 2019-09-06 PROCEDURE — 25010000002 LEVOFLOXACIN PER 250 MG: Performed by: PHYSICIAN ASSISTANT

## 2019-09-06 PROCEDURE — 83690 ASSAY OF LIPASE: CPT | Performed by: PHYSICIAN ASSISTANT

## 2019-09-06 PROCEDURE — 76700 US EXAM ABDOM COMPLETE: CPT

## 2019-09-06 PROCEDURE — 93010 ELECTROCARDIOGRAM REPORT: CPT | Performed by: INTERNAL MEDICINE

## 2019-09-06 PROCEDURE — 99221 1ST HOSP IP/OBS SF/LOW 40: CPT | Performed by: SURGERY

## 2019-09-06 RX ORDER — ONDANSETRON 4 MG/1
4 TABLET, FILM COATED ORAL EVERY 6 HOURS PRN
Status: DISCONTINUED | OUTPATIENT
Start: 2019-09-06 | End: 2019-09-08 | Stop reason: HOSPADM

## 2019-09-06 RX ORDER — HYDROCODONE BITARTRATE AND ACETAMINOPHEN 5; 325 MG/1; MG/1
1 TABLET ORAL EVERY 8 HOURS PRN
Status: DISCONTINUED | OUTPATIENT
Start: 2019-09-06 | End: 2019-09-07

## 2019-09-06 RX ORDER — ACETAMINOPHEN 160 MG/5ML
650 SOLUTION ORAL EVERY 4 HOURS PRN
Status: DISCONTINUED | OUTPATIENT
Start: 2019-09-06 | End: 2019-09-08 | Stop reason: HOSPADM

## 2019-09-06 RX ORDER — RAMIPRIL 5 MG/1
5 CAPSULE ORAL DAILY
Status: DISCONTINUED | OUTPATIENT
Start: 2019-09-06 | End: 2019-09-08 | Stop reason: HOSPADM

## 2019-09-06 RX ORDER — SODIUM CHLORIDE 0.9 % (FLUSH) 0.9 %
10 SYRINGE (ML) INJECTION AS NEEDED
Status: DISCONTINUED | OUTPATIENT
Start: 2019-09-06 | End: 2019-09-08 | Stop reason: HOSPADM

## 2019-09-06 RX ORDER — LEVOFLOXACIN 5 MG/ML
750 INJECTION, SOLUTION INTRAVENOUS ONCE
Status: COMPLETED | OUTPATIENT
Start: 2019-09-06 | End: 2019-09-06

## 2019-09-06 RX ORDER — ALBUTEROL SULFATE 2.5 MG/3ML
2.5 SOLUTION RESPIRATORY (INHALATION) EVERY 6 HOURS PRN
Status: DISCONTINUED | OUTPATIENT
Start: 2019-09-06 | End: 2019-09-08 | Stop reason: HOSPADM

## 2019-09-06 RX ORDER — NITROGLYCERIN 0.4 MG/1
0.4 TABLET SUBLINGUAL
Status: DISCONTINUED | OUTPATIENT
Start: 2019-09-06 | End: 2019-09-08 | Stop reason: HOSPADM

## 2019-09-06 RX ORDER — DEXTROSE, SODIUM CHLORIDE, AND POTASSIUM CHLORIDE 5; .45; .15 G/100ML; G/100ML; G/100ML
100 INJECTION INTRAVENOUS CONTINUOUS
Status: DISCONTINUED | OUTPATIENT
Start: 2019-09-06 | End: 2019-09-07

## 2019-09-06 RX ORDER — ACETAMINOPHEN 325 MG/1
650 TABLET ORAL EVERY 4 HOURS PRN
Status: DISCONTINUED | OUTPATIENT
Start: 2019-09-06 | End: 2019-09-08 | Stop reason: HOSPADM

## 2019-09-06 RX ORDER — ROSUVASTATIN CALCIUM 10 MG/1
10 TABLET, COATED ORAL DAILY
Status: DISCONTINUED | OUTPATIENT
Start: 2019-09-06 | End: 2019-09-08 | Stop reason: HOSPADM

## 2019-09-06 RX ORDER — SODIUM CHLORIDE 0.9 % (FLUSH) 0.9 %
10 SYRINGE (ML) INJECTION EVERY 12 HOURS SCHEDULED
Status: DISCONTINUED | OUTPATIENT
Start: 2019-09-06 | End: 2019-09-08 | Stop reason: HOSPADM

## 2019-09-06 RX ORDER — ASPIRIN 325 MG
325 TABLET ORAL ONCE
Status: COMPLETED | OUTPATIENT
Start: 2019-09-06 | End: 2019-09-06

## 2019-09-06 RX ORDER — PANTOPRAZOLE SODIUM 40 MG/1
40 TABLET, DELAYED RELEASE ORAL DAILY
Status: DISCONTINUED | OUTPATIENT
Start: 2019-09-06 | End: 2019-09-08 | Stop reason: HOSPADM

## 2019-09-06 RX ORDER — CLOPIDOGREL BISULFATE 75 MG/1
75 TABLET ORAL DAILY
Status: DISCONTINUED | OUTPATIENT
Start: 2019-09-06 | End: 2019-09-06

## 2019-09-06 RX ORDER — ACETAMINOPHEN 650 MG/1
650 SUPPOSITORY RECTAL EVERY 4 HOURS PRN
Status: DISCONTINUED | OUTPATIENT
Start: 2019-09-06 | End: 2019-09-08 | Stop reason: HOSPADM

## 2019-09-06 RX ADMIN — NITROGLYCERIN 0.4 MG: 0.4 TABLET SUBLINGUAL at 10:25

## 2019-09-06 RX ADMIN — RAMIPRIL 5 MG: 5 CAPSULE ORAL at 21:21

## 2019-09-06 RX ADMIN — POTASSIUM CHLORIDE, DEXTROSE MONOHYDRATE AND SODIUM CHLORIDE 100 ML/HR: 150; 5; 450 INJECTION, SOLUTION INTRAVENOUS at 21:33

## 2019-09-06 RX ADMIN — NITROGLYCERIN 0.4 MG: 0.4 TABLET SUBLINGUAL at 10:32

## 2019-09-06 RX ADMIN — LEVOFLOXACIN 750 MG: 5 INJECTION, SOLUTION INTRAVENOUS at 13:46

## 2019-09-06 RX ADMIN — IOPAMIDOL 85 ML: 612 INJECTION, SOLUTION INTRAVENOUS at 10:53

## 2019-09-06 RX ADMIN — ASPIRIN 325 MG: 325 TABLET ORAL at 10:08

## 2019-09-06 RX ADMIN — ROSUVASTATIN CALCIUM 10 MG: 10 TABLET, FILM COATED ORAL at 21:22

## 2019-09-06 RX ADMIN — PANTOPRAZOLE SODIUM 40 MG: 40 TABLET, DELAYED RELEASE ORAL at 21:21

## 2019-09-06 NOTE — ED PROVIDER NOTES
Pt presents to the ED c/o constant chest pain w/ h/o CAD, CABG, and MI. Pain does not radiate to the jaw, back, or LUE. Pain is worse with palpation and not completely similar to prior cardiac events.     On exam,   No chest wall tenderness, epigastric region TTP.    EKG          EKG time: 0903  Rhythm/Rate: NSR 83  P waves and DC: nml  QRS, axis: nml   ST and T waves: ST depression V6     Interpreted Contemporaneously by me, independently viewed  No prior for comparison    Plan: to repeat troponin and do CT abd     Attestation:  The LORNA and I have discussed this patient's history, physical exam, and treatment plan.  I have reviewed the documentation and personally had a face to face interaction with the patient. I affirm the documentation and agree with the treatment and plan.  The attached note describes my personal findings.       Documentation assistance provided by noé Graff for Dr. Brown.  Information recorded by the scribe was done at my direction and has been verified and validated by me.     Yoselin Graff  09/06/19 1007       Malcolm Brown MD  09/06/19 [id number]

## 2019-09-06 NOTE — PROGRESS NOTES
Clinical Pharmacy Services: Medication History    Madhu Santoro is a 73 y.o. male presenting to [facility] for   Chief Complaint   Patient presents with   • Chest Pain       He  has a past medical history of Anxiety, Arthritis, Atrial flutter (CMS/HCC), Mandel esophagus, Benign essential hypertension, CAD (coronary artery disease), Carotid artery disease (CMS/HCC), Colonic polyp, DDD (degenerative disc disease), lumbosacral, GERD (gastroesophageal reflux disease), H/O bone density study (2013), H/O complete eye exam (2014), HLD (hyperlipidemia), Hypertension, Lipid screening (05/31/2013), Low back pain, Screening for prostate cancer (07/07/2015), and Stroke (CMS/[facility]).    Allergies as of 09/06/2019 - Reviewed 09/06/2019   Allergen Reaction Noted   • Lipitor [atorvastatin] Myalgia 05/23/2018   • Penicillins Hives and Swelling 10/05/2016       Medication information was obtained from: Patient  Pharmacy and Phone Number: [facility] [phone]    Prior to Admission Medications     Prescriptions Last Dose Informant Patient Reported? Taking?    albuterol sulfate  (90 Base) MCG/ACT inhaler  Self No Yes    Inhale 2 puffs Every 4 (Four) Hours As Needed for Wheezing.    clopidogrel (PLAVIX) 75 MG tablet 9/4/2019 Self No Yes    Take 1 tablet by mouth Daily.    HYDROcodone-Acetaminophen (VICODIN) 5-300 MG per tablet 9/3/2019 Self No Yes    Take 1 tablet by mouth 2 (Two) Times a Day.    nitroglycerin (NITROSTAT) 0.4 MG SL tablet  Self No Yes    Place 1 tablet under the tongue Every 5 (Five) Minutes As Needed for Chest Pain. Take no more than 3 doses in 15 minutes.    pantoprazole (PROTONIX) 40 MG EC tablet 9/4/2019 Self No Yes    Take 1 tablet by mouth Daily.    ramipril (ALTACE) 5 MG capsule 9/4/2019 Self No Yes    Take 1 capsule by mouth Daily.    rosuvastatin (CRESTOR) 10 MG tablet 9/4/2019 Self No Yes    Take 1 tablet by mouth Daily.            Medication notes: None    This medication list is complete  to the best of my knowledge as of 9/6/2019    Please call if questions.    Shelby Talamantes, Medication History Technician  9/6/2019 3:05 PM

## 2019-09-06 NOTE — H&P
History and Physical    Patient Name: Madhu Santoro  Age/Sex: 73 y.o. male  : 1945  MRN: [id number]    Date of Admission: 2019  Date of Encounter Visit: 19  Encounter Provider: Alexander Nicole MD  Place of Service: [facility]  Patient Care Team:  Damian Chen MD as PCP - General (Family Medicine)  Damian Chen MD as PCP - Claims Attributed  Temo Cortez MD as Surgeon (Cardiothoracic Surgery)    Subjective:     Chief Complaint:   Chief Complaint   Patient presents with   • Chest Pain       History of Present Illness  Madhu Santoro is a 73 y.o. male with a history of CAD s/p CABG in  2017, HTN, HLD, anguiano's esophagus, pancreatic lesion, stroke and Aflutter s/p ablation. Patient presented with complaints of epigastric pain  that started 3 days ago but worsened yesterday evening. Symptoms were associated with extreme weakness, nausea and decreased appetite. He describes the pain as an intense pain that makes you want to double over. He denies any radiation, worsening after eating, spasm, heartburn, indigestion, shortness of breath, palpitations, bloating, diarrhea, consipation or dizziness. Pain is directly over lower scar from prior CABG.  improved with nitro in the ED, but no alleviating factors at home. He reports pain is somewhat different that his prior heart issues.     He has a family history of cardiac disease and stroke with one brother dying of a heart attack and paternal cousin with pancreatic cancer. He had a recent pancreatic work up that was unremarkable and was encouraged to follow up in 1 year with MRCP and CT. Labs in ER were unremarkable with normal LFT's, low lipase and negative troponin x2. EKG showed no significant changes from prior. CT abdomen showed pericholecystic fluid with no ductal dilation or obvious stone, but did note a kidney stone.       Review of Systems   Constitutional: Positive for activity change and fatigue. Negative for  chills, diaphoresis, fever and unexpected weight change.   HENT: Negative for facial swelling.    Respiratory: Negative for chest tightness and shortness of breath.    Cardiovascular: Negative for chest pain, palpitations and leg swelling.   Gastrointestinal: Positive for abdominal pain. Negative for abdominal distention, constipation, diarrhea and nausea.        Epigastric    Genitourinary: Negative for decreased urine volume, difficulty urinating, dysuria, flank pain and urgency.   Musculoskeletal: Negative for back pain and neck pain.   Skin: Negative.    Neurological: Positive for weakness. Negative for dizziness, speech difficulty, light-headedness, numbness and headaches.        Generalized    Hematological: Negative for adenopathy. Bruises/bleeds easily.   Psychiatric/Behavioral: Negative for agitation, confusion, hallucinations and sleep disturbance.        Past Medical and Surgical History:  Past Medical History:   Diagnosis Date   • Anxiety    • Arthritis    • Atrial flutter (CMS/HCC)     Status post cavotricuspid isthmus ablation by Dr. Gustafson on 4/18/17   • Mandel esophagus    • Benign essential hypertension    • CAD (coronary artery disease)     3 vessel CABG 4/11/17 by Dr. Cortez: ROSARIO-prox LAD, SVG-OM1, SVG-OM3   • Carotid artery disease (CMS/HCC)     Status post carotid endarterectomy - USG 4/10/17: 50-59% NICHELLE, 1-15% LICA.    • Colonic polyp    • DDD (degenerative disc disease), lumbosacral    • GERD (gastroesophageal reflux disease)    • H/O bone density study 2013   • H/O complete eye exam 2014   • HLD (hyperlipidemia)    • Hypertension    • Lipid screening 05/31/2013   • Low back pain     [facility]   • Screening for prostate cancer 07/07/2015   • Stroke (CMS/HCC)        Past Surgical History:   Procedure Laterality Date   • CARDIAC CATHETERIZATION N/A 4/10/2017    Procedure: Left Heart Cath;  Surgeon: Marjorie Healy MD;  Location: [facility] CATH INVASIVE LOCATION;   Service:    • CARDIAC CATHETERIZATION N/A 4/10/2017    Procedure: Coronary angiography;  Surgeon: Marjorie Healy MD;  Location:  [facility] CATH INVASIVE LOCATION;  Service:    • CARDIAC CATHETERIZATION N/A 4/10/2017    Procedure: Left ventriculography;  Surgeon: Marjorie Healy MD;  Location: [facility] CATH INVASIVE LOCATION;  Service:    • CARDIAC ELECTROPHYSIOLOGY PROCEDURE N/A 4/18/2017    Procedure: Ablation atrial flutter;  Surgeon: Jose Antonio Gustafson MD;  Location:  [facility] CATH INVASIVE LOCATION;  Service:    • COLONOSCOPY  01/06/2015    Diverticulosis, one TA   • COLONOSCOPY N/A 2/14/2019    tics, NBIH, adenomatous polyp x 2   • CORONARY ARTERY BYPASS GRAFT N/A 4/11/2017    Procedure: AR STERNOTOMY CORONARY ARTERY BYPASS GRAFT TIMES 3 USING LEFT INTERNAL MAMMARY ARTERY AND LEFT GREATER SAPHENOUS VEIN GRAFT PER ENDOSCOPIC VEIN HARVESTING AND PRP ;  Surgeon: Temo Cortez MD;  Location[facility] MA[facility];  Service:    • ENDOSCOPY  01/06/2015    HH, Mandel's esophagus   • ENDOSCOPY N/A 2/14/2019    Z line irregular, HH, Mandel's esophagus   • VASECTOMY         Home Medications:   Medications Prior to Admission   Medication Sig Dispense Refill Last Dose   • albuterol sulfate  (90 Base) MCG/ACT inhaler Inhale 2 puffs Every 4 (Four) Hours As Needed for Wheezing. 1 inhaler 0 Past Week at Unknown time   • clopidogrel (PLAVIX) 75 MG tablet Take 1 tablet by mouth Daily. 90 tablet 1 Past Week at Unknown time   • HYDROcodone-Acetaminophen (VICODIN) 5-300 MG per tablet Take 1 tablet by mouth 2 (Two) Times a Day. 60 tablet 0 Past Month at Unknown time   • nitroglycerin (NITROSTAT) 0.4 MG SL tablet Place 1 tablet under the tongue Every 5 (Five) Minutes As Needed for Chest Pain. Take no more than 3 doses in 15 minutes. 20 tablet 2 Past Week at Unknown time   • pantoprazole (PROTONIX) 40 MG EC tablet Take 1 tablet by mouth Daily. 90 tablet 1 Past Week at Unknown time   • ramipril (ALTACE) 5 MG capsule Take 1 capsule by mouth  Daily. 90 capsule 1 Past Week at Unknown time   • rosuvastatin (CRESTOR) 10 MG tablet Take 1 tablet by mouth Daily. 90 tablet 1 Past Week at Unknown time       Inpatient Medications:  Scheduled Meds:    pantoprazole 40 mg Oral Daily   ramipril 5 mg Oral Daily   rosuvastatin 10 mg Oral Daily   sodium chloride 10 mL Intravenous Q12H     Continuous Infusions:    dextrose 5 % and sodium chloride 0.45 % with KCl 20 mEq/L 100 mL/hr Last Rate: 100 mL/hr (09/06/19 2133)     PRN Meds:.•  acetaminophen **OR** acetaminophen **OR** acetaminophen  •  albuterol  •  HYDROcodone-acetaminophen  •  nitroglycerin  •  ondansetron  •  sodium chloride  •  sodium chloride    Allergies:  Allergies   Allergen Reactions   • Lipitor [Atorvastatin] Myalgia   • Penicillins Hives and Swelling       Past Social History:  Social History     Socioeconomic History   • Marital status:      Spouse name: Not on file   • Number of children: Not on file   • Years of education: Not on file   • Highest education level: Not on file   Tobacco Use   • Smoking status: Former Smoker   • Smokeless tobacco: Never Used   • Tobacco comment: CAFFEINE USE   Substance and Sexual Activity   • Alcohol use: Yes     Comment: SOCIALLY   • Drug use: No   • Sexual activity: Yes     Partners: Female       Past Family History:  Family History   Problem Relation Age of Onset   • Heart disease Mother    • Heart attack Mother    • Stroke Mother    • Heart disease Father    • Heart attack Father    • Stroke Father    • Arthritis Other    • Diabetes Other    • Hypertension Other    • Kidney disease Other         stones   • Hypertension Sister    • Heart attack Brother    • Heart disease Brother    • No Known Problems Maternal Grandmother    • No Known Problems Maternal Grandfather    • No Known Problems Paternal Grandmother    • No Known Problems Paternal Grandfather    • No Known Problems Brother    • Heart disease Brother    • Heart attack Brother    • Diabetes Brother     • Pancreatic cancer Paternal Cousin        Objective:   Temp:  [96.7 °F (35.9 °C)-98.1 °F (36.7 °C)] 96.7 °F (35.9 °C)  Heart Rate:  [54-90] 64  Resp:  [15-16] 16  BP: (116-162)/(61-90) 128/62   SpO2:  [91 %-97 %] 95 %  on    Device (Oxygen Therapy): room air    Intake/Output Summary (Last 24 hours) at 9/6/2019 2207  Last data filed at 9/6/2019 1829  Gross per 24 hour   Intake 360 ml   Output --   Net 360 ml     Body mass index is 25.83 kg/m².      09/06/19  0910 09/06/19  1538   Weight: 81.6 kg (180 lb) 77.1 kg (169 lb 14.4 oz)     Weight change:     Physical Exam   Constitutional: He is oriented to person, place, and time. He appears well-developed and well-nourished. No distress.   HENT:   Head: Normocephalic and atraumatic.   Mouth/Throat: Mucous membranes are normal. No dental caries.   Reddened face and neck   Eyes: Conjunctivae and lids are normal. Pupils are equal, round, and reactive to light.   Neck: Normal range of motion. Neck supple. No neck rigidity. No tracheal deviation, no edema and no erythema present. No thyromegaly present.   Cardiovascular: Normal rate, regular rhythm, normal heart sounds, intact distal pulses and normal pulses.   Pulmonary/Chest: Effort normal. No respiratory distress. He has decreased breath sounds in the right lower field and the left lower field. He has no rales.   Abdominal: Soft. Bowel sounds are normal. He exhibits no distension and no mass. There is no hepatosplenomegaly, splenomegaly or hepatomegaly. There is tenderness (lower sternum near keloid scar from prior CABG) in the epigastric area. There is guarding. There is no rebound and no CVA tenderness. No hernia.   Musculoskeletal: He exhibits no edema.   Neurological: He is alert and oriented to person, place, and time.   Skin: Skin is warm, dry and intact. He is not diaphoretic. No cyanosis. Nails show no clubbing.   Psychiatric: He has a normal mood and affect. His speech is normal and behavior is normal. Thought  content normal.        Lab Review:     Results from last 7 days   Lab Units 09/06/19  0932   SODIUM mmol/L 138   POTASSIUM mmol/L 4.5   CHLORIDE mmol/L 99   CO2 mmol/L 25.4   BUN mg/dL 14   CREATININE mg/dL 1.00   GLUCOSE mg/dL 99   CALCIUM mg/dL 9.4   AST (SGOT) U/L 35   ALT (SGPT) U/L 23     Estimated Creatinine Clearance: 70.7 mL/min (by C-G formula based on SCr of 1 mg/dL).          Results from last 7 days   Lab Units 09/06/19  1133 09/06/19  0932   TROPONIN T ng/mL <0.010 <0.010                   Invalid input(s):  PHOS      Results from last 7 days   Lab Units 09/06/19  0932   WBC 10*3/mm3 10.08   HEMOGLOBIN g/dL 16.4   HEMATOCRIT % 50.8   PLATELETS 10*3/mm3 345   MCV fL 92.5   MCH pg 29.9   MCHC g/dL 32.3   RDW % 14.6   RDW-SD fl 50.2   MPV fL 9.9   NEUTROPHIL % % 76.3*   LYMPHOCYTE % % 9.0*   MONOCYTES % % 13.4*   EOSINOPHIL % % 0.0*   BASOPHIL % % 0.2   IMM GRAN % % 1.1*   NEUTROS ABS 10*3/mm3 7.69*   LYMPHS ABS 10*3/mm3 0.91   MONOS ABS 10*3/mm3 1.35*   EOS ABS 10*3/mm3 0.00   BASOS ABS 10*3/mm3 0.02   IMMATURE GRANS (ABS) 10*3/mm3 0.11*   NRBC /100 WBC 0.0                   Results from last 7 days   Lab Units 09/06/19  0932   LIPASE U/L 12*                       Imaging:  Imaging Results (last 24 hours)     Procedure Component Value Units Date/Time    US Abdomen Complete [[id number]] Updated:  09/06/19 2057    CT Abdomen Pelvis With Contrast [[id number]] Collected:  09/06/19 1232     Updated:  09/06/19 1232    Narrative:       CT ABDOMEN AND PELVIS WITH IV CONTRAST     HISTORY: 73-year-old male with abdominal pain.     TECHNIQUE: Radiation dose reduction techniques were utilized, including  automated exposure control and exposure modulation based on body size.   3 mm images were obtained through the abdomen and pelvis after the  administration of IV contrast. Compared with previous CT from  02/13/2019.     FINDINGS: The gallbladder is distended and has a thickened wall. There  is a tiny amount of  pericholecystic fluid and there is slight haziness  which extends along the common bile duct. There is no intrahepatic or  extrahepatic biliary dilatation. There is no acute abnormality involving  the pancreas, spleen, adrenals, or right kidney. There is a 7 mm  nonobstructing stone at the upper pole of the left kidney. No acute  bowel abnormality is seen. There is moderately extensive sigmoid  diverticulosis without evidence for diverticulitis. The appendix appears  within normal limits. There are extensive abdominal aortic  atherosclerotic changes with luminal narrowing at the bifurcation of the  common iliac arteries and there is likely hemodynamically significant  stenosis at the proximal right common iliac artery. There is a tiny  umbilical hernia containing fat.       Impression:       1. There is likely acute cholecystitis. There is no biliary dilatation.  2. There is moderately extensive sigmoid diverticulosis without evidence  for diverticulitis. There is no evidence for appendicitis.  3. Single 7 mm nonobstructing stone at the upper pole of the left  kidney.     Discussed with Dr. Brown.       XR Chest 2 View [[id number]] Collected:  09/06/19 1054     Updated:  09/06/19 1112    Narrative:       PA AND LATERAL RADIOGRAPHIC VIEWS OF THE CHEST     CLINICAL HISTORY: Chest pain triage protocol.     PA and lateral radiographic views of the chest demonstrate clear lungs.  There is old granulomatous disease with calcified right paratracheal  lymph nodes. Cardiomediastinal silhouette is remarkable for evidence of  a prior median sternotomy. Degenerative phenomena are noted within the  thoracic spine.       Impression:       No active disease in the chest.     This report was finalized on 9/6/2019 11:09 AM by Dr. Guero Hazel M.D.             EKG:  ECG 12 Lead   Final Result   HEART RATE= 83  bpm   RR Interval= 724  ms   PA Interval= 149  ms   P Horizontal Axis= -3  deg   P Front Axis= 83  deg   QRSD Interval=  95  ms   QT Interval= 381  ms   QRS Axis= -19  deg   T Wave Axis= 68  deg   - ABNORMAL ECG -   Sinus rhythm   Atrial premature complex   Probable left atrial enlargement   LVH with secondary repolarization abnormality   SINCE PREVIOUS TRACING,  HR HAS INCREASED   Electronically Signed By: Jordon Eid ([facility]) 06-Sep-2019 19:11:35   Date and Time of Study: 2019-09-06 09:03:39      ECG 12 Lead    (Results Pending)       I reviewed the patient's new clinical results and medications.  I reviewed the patient's new imaging results and agree with the interpretation.  I personally viewed and interpreted the patient's EKG/Telemetry data.    Problem List:     Active Hospital Problems    Diagnosis  POA   • **Acute cholecystitis [K81.0]  Yes   • Right upper quadrant abdominal pain [R10.11]  Yes   • S/P CABG (coronary artery bypass graft) [Z95.1]  Not Applicable   • Gastroesophageal reflux disease [K21.9]  Yes   • HLD (hyperlipidemia) [E78.5]  Yes   • Benign essential hypertension [I10]  Yes   • Anxiety [F41.9]  Yes   • DDD (degenerative disc disease), lumbosacral [M51.37]  Yes   • Arthritis [M19.90]  Yes      Resolved Hospital Problems   No resolved problems to display.        Assessment and Plan:     Acute Cholecystitis/Chest Pain   - troponin <0.010 x2 and no ischemic changes EKG, but pain did improve with nitro.  Pain is atypical and could be related to acute cholecystitis, but other differentials could include CAD, esophageal spasm or reflux.  - CT showed signs of cholecystitis, no sign of gallstone or ductal dilation, LFT and lipase normal.  Given dose of Levaquin in the ED.  - Consult general surgery- Emergency room provider spoke with Dr. Pate who agrees to take him to the OR tomorrow.   - Hold Plavix, last dose was Wednesday morning  -CMP and CBC in am   -Abdominal ultrasound  -PRN Zofran for nausea    HLD/CAD -S/P CABG 2017--   -Continue home Crestor and Altace  - Hold Plavix for surgical procedure but  restart as soon as okay with general surgeon   - Continue home PRN nitro   -Consult Cardiology for chest pain and preop clearance    Hypertension   BP stable.  -Continue home meds and monitor    Degenerative Disk Disease/Arthritits    -Continue home PRN  pain medications     GERD    -Continue home Protonix     Admit to Medical Surgical   DVT prophylaxis: SCD  Code status addressed: Full Code   Diet: Clear liquid diet    I discussed the patients findings and my recommendations with patient and nursing staff.    MY Da Silva  [facility]  09/06/19  2:25 PM    Dictated utilizing Dragon dictation    Addendum:  I have interviewed and examined the patient and agree with the above note.  Patient with a history of CAD on plavix presenting with 3 days of abdominal pain and was found to have evidence of cholecystitis on abdominal CT scan.  He stopped taking his plavix 3d ago due to feeling ill.  He is on levofloxacin.  I discussed with Dr. Travis and she is planning OR tomorrow for cholecystectomy with intraoperative cholangiogram.    Attending Physical Exam   Constitutional: He is oriented to person, place, and time. He appears well-developed and well-nourished. No distress.   Head: Normocephalic and atraumatic.   Mouth/Throat: Mucous membranes are normal. No dental caries.   Eyes: Conjunctivae and lids are normal. Pupils are equal, round, and reactive to light.   Neck: Normal range of motion. Neck supple. No neck rigidity. No tracheal deviation, no edema and no erythema present. No thyromegaly present.   Cardiovascular: Normal rate, regular rhythm, normal heart sounds, intact distal pulses and normal pulses.   Pulmonary/Chest: Effort normal. No respiratory distress. Breath sounds are clear to ascultation.  Abdominal: Soft. Non-distended.  Bowel sounds are normal.  There is RUQ and epigastric tenderness without rebound.   Musculoskeletal: He exhibits no edema.   Neurological: He is alert and  oriented to person, place, and time.   Skin: Skin is warm, dry and intact. He is not diaphoretic. No cyanosis. Nails show no clubbing.   Psychiatric: He has a normal mood and affect. His speech is normal and behavior is normal. Thought content normal.     Alexander Nicole MD  9/6/2019  7:05 PM

## 2019-09-06 NOTE — ED PROVIDER NOTES
"EMERGENCY DEPARTMENT ENCOUNTER    Room Number:  30/30  Date seen:  9/6/2019  Time seen: 9:44 AM  PCP: Damian Chen MD  Historian: patient      HPI:  Chief complaint: lower, mid-sternal chest pain  Context: Madhu Santoro is a 73 y.o. male who presents to the ED c/o lower, sub-sternal chest pain that started yesterday afternoon and has persisted since. Pt states that his CP was initially \"sharp\" yesterday, but is now a \"dull ache\". Pt denies any aggravating or alleviating factors and any radiation of his pain. Pt reports generalized weakness yesterday, which has since improved. Pt denies nausea, diaphoresis, and SOA. Pt states that he mowed his lawn yesterday and his last meal was a bagel earlier that morning. Pt with hx of CABG x3 performed by Dr. Cortez in 2017.       MEDICAL RECORD REVIEW      Pt with hx of CABG x3 performed by Dr. Cortez in 2017.       ALLERGIES  Lipitor [atorvastatin] and Penicillins    PAST MEDICAL HISTORY  Active Ambulatory Problems     Diagnosis Date Noted   • Anxiety    • Arthritis    • Chronic coronary artery disease    • HLD (hyperlipidemia)    • Benign essential hypertension    • DDD (degenerative disc disease), lumbosacral    • Cerebrovascular accident (CMS/[facility])    • Encounter for screening for cardiovascular disorders 11/20/2012   • Gastroesophageal reflux disease 04/04/2016   • Hyperlipidemia 04/04/2016   • Stenosis of carotid artery 04/26/2017   • Former smoker 04/26/2017   • History of cardiac catheterization 12/16/2011   • S/P ablation of atrial flutter 04/26/2017   • Typical atrial flutter (CMS/HCC) 04/26/2017   • S/P CABG (coronary artery bypass graft) 04/26/2017   • Adenomatous polyp of ascending colon 02/12/2019   • Abdominal bloating 02/12/2019   • Stroke (CMS/HCC) 03/29/2019   • PAD (peripheral artery disease) (CMS/[facility]) 03/29/2019     Resolved Ambulatory Problems     Diagnosis Date Noted   • No Resolved Ambulatory Problems     Past Medical History:   Diagnosis Date   • " Anxiety    • Arthritis    • Atrial flutter (CMS/HCC)    • Mandel esophagus    • Benign essential hypertension    • CAD (coronary artery disease)    • Carotid artery disease (CMS/HCC)    • Colonic polyp    • DDD (degenerative disc disease), lumbosacral    • GERD (gastroesophageal reflux disease)    • H/O bone density study 2013   • H/O complete eye exam 2014   • HLD (hyperlipidemia)    • Hypertension    • Lipid screening 05/31/2013   • Low back pain    • Screening for prostate cancer 07/07/2015   • Stroke (CMS/HCC)        PAST SURGICAL HISTORY  Past Surgical History:   Procedure Laterality Date   • CARDIAC CATHETERIZATION N/A 4/10/2017    Procedure: Left Heart Cath;  Surgeon: Marjorie Healy MD;  [facility];  Service:    • CARDIAC CATHETERIZATION N/A 4/10/2017    Procedure: Coronary angiography;  Surgeon: Marjorie Healy MD;  [facility];  Service:    • CARDIAC CATHETERIZATION N/A 4/10/2017    Procedure: Left ventriculography;  Surgeon: Marjorie Healy MD;  Location: [facility] CATH INVASIVE LOCATION;  Service:    • CARDIAC ELECTROPHYSIOLOGY PROCEDURE N/A 4/18/2017    Procedure: Ablation atrial flutter;  Surgeon: Jose Antonio Gustafson MD;  Location: [facility] CATH INVASIVE LOCATION;  Service:    • COLONOSCOPY  01/06/2015    Diverticulosis, one TA   • COLONOSCOPY N/A 2/14/2019    tics, NBIH, adenomatous polyp x 2   • CORONARY ARTERY BYPASS GRAFT N/A 4/11/2017    Procedure: AR STERNOTOMY CORONARY ARTERY BYPASS GRAFT TIMES 3 USING LEFT INTERNAL MAMMARY ARTERY AND LEFT GREATER SAPHENOUS VEIN GRAFT PER ENDOSCOPIC VEIN HARVESTING AND PRP ;  Surgeon: Temo Cortez MD;  Location: [facility] MAIN OR;  Service:    • ENDOSCOPY  01/06/2015    HH, Mandel's esophagus   • ENDOSCOPY N/A 2/14/2019    Z line irregular, HH, Mandel's esophagus   • VASECTOMY         FAMILY HISTORY  Family History   Problem Relation Age of Onset   • Heart disease Mother    • Heart attack Mother    • Stroke  Mother    • Heart disease Father    • Heart attack Father    • Stroke Father    • Arthritis Other    • Diabetes Other    • Hypertension Other    • Kidney disease Other         stones   • Hypertension Sister    • Heart attack Brother    • Heart disease Brother    • No Known Problems Maternal Grandmother    • No Known Problems Maternal Grandfather    • No Known Problems Paternal Grandmother    • No Known Problems Paternal Grandfather    • No Known Problems Brother    • Heart disease Brother    • Heart attack Brother    • Diabetes Brother        SOCIAL HISTORY  Social History     Socioeconomic History   • Marital status:      Spouse name: Not on file   • Number of children: Not on file   • Years of education: Not on file   • Highest education level: Not on file   Tobacco Use   • Smoking status: Former Smoker   • Smokeless tobacco: Never Used   • Tobacco comment: CAFFEINE USE   Substance and Sexual Activity   • Alcohol use: Yes     Comment: SOCIALLY   • Drug use: No   • Sexual activity: Yes     Partners: Female           REVIEW OF SYSTEMS  Review of Systems   Constitutional: Positive for appetite change (decreased). Negative for activity change, diaphoresis and fever.   HENT: Negative for congestion and sore throat.    Eyes: Negative.    Respiratory: Negative for cough and shortness of breath.    Cardiovascular: Positive for chest pain (lower, mid-sternal). Negative for leg swelling.   Gastrointestinal: Negative for abdominal pain, diarrhea, nausea and vomiting.   Genitourinary: Negative for decreased urine volume and dysuria.   Musculoskeletal: Negative for neck pain.   Skin: Negative for rash and wound.   Allergic/Immunologic: Negative for immunocompromised state.   Neurological: Positive for weakness (generalized weakness yesterday, imroved at this time). Negative for numbness and headaches.   Psychiatric/Behavioral: Negative.    All other systems reviewed and are negative.          PHYSICAL EXAM  ED Triage  Vitals   Temp Heart Rate Resp BP SpO2   09/06/19 0902 09/06/19 0902 09/06/19 0902 09/06/19 0910 09/06/19 0902   98.1 °F (36.7 °C) 90 16 162/83 97 %      Temp src Heart Rate Source Patient Position BP Location FiO2 (%)   09/06/19 0902 09/06/19 0902 -- 09/06/19 0910 --   Tympanic Monitor  Right arm      Physical Exam   Constitutional: He is oriented to person, place, and time. No distress.   HENT:   Head: Normocephalic and atraumatic.   Eyes: EOM are normal. Pupils are equal, round, and reactive to light.   Neck: Normal range of motion. Neck supple.   Cardiovascular: Normal rate, regular rhythm and normal heart sounds.   Pulmonary/Chest: Effort normal and breath sounds normal. No respiratory distress. He exhibits tenderness (mild reproducible chest tenderness at the lower sternum).   Abdominal: Soft. There is no tenderness. There is no rebound and no guarding.   Musculoskeletal: Normal range of motion. He exhibits no edema.   Neurological: He is alert and oriented to person, place, and time. He has normal sensation and normal strength.   Skin: Skin is warm and dry.   Psychiatric: Mood and affect normal.   Nursing note and vitals reviewed.        LAB RESULTS  Recent Results (from the past 24 hour(s))   Comprehensive Metabolic Panel    Collection Time: 09/06/19  9:32 AM   Result Value Ref Range    Glucose 99 65 - 99 mg/dL    BUN 14 8 - 23 mg/dL    Creatinine 1.00 0.76 - 1.27 mg/dL    Sodium 138 136 - 145 mmol/L    Potassium 4.5 3.5 - 5.2 mmol/L    Chloride 99 98 - 107 mmol/L    CO2 25.4 22.0 - 29.0 mmol/L    Calcium 9.4 8.6 - 10.5 mg/dL    Total Protein 7.0 6.0 - 8.5 g/dL    Albumin 4.20 3.50 - 5.20 g/dL    ALT (SGPT) 23 1 - 41 U/L    AST (SGOT) 35 1 - 40 U/L    Alkaline Phosphatase 99 39 - 117 U/L    Total Bilirubin 1.7 (H) 0.2 - 1.2 mg/dL    eGFR Non African Amer 73 >60 mL/min/1.73    Globulin 2.8 gm/dL    A/G Ratio 1.5 g/dL    BUN/Creatinine Ratio 14.0 7.0 - 25.0    Anion Gap 13.6 5.0 - 15.0 mmol/L   Troponin     Collection Time: 09/06/19  9:32 AM   Result Value Ref Range    Troponin T <0.010 0.000 - 0.030 ng/mL   Light Blue Top    Collection Time: 09/06/19  9:32 AM   Result Value Ref Range    Extra Tube hold for add-on    Green Top (Gel)    Collection Time: 09/06/19  9:32 AM   Result Value Ref Range    Extra Tube Hold for add-ons.    Lavender Top    Collection Time: 09/06/19  9:32 AM   Result Value Ref Range    Extra Tube hold for add-on    Gold Top - SST    Collection Time: 09/06/19  9:32 AM   Result Value Ref Range    Extra Tube Hold for add-ons.    CBC Auto Differential    Collection Time: 09/06/19  9:32 AM   Result Value Ref Range    WBC 10.08 3.40 - 10.80 10*3/mm3    RBC 5.49 4.14 - 5.80 10*6/mm3    Hemoglobin 16.4 13.0 - 17.7 g/dL    Hematocrit 50.8 37.5 - 51.0 %    MCV 92.5 79.0 - 97.0 fL    MCH 29.9 26.6 - 33.0 pg    MCHC 32.3 31.5 - 35.7 g/dL    RDW 14.6 12.3 - 15.4 %    RDW-SD 50.2 37.0 - 54.0 fl    MPV 9.9 6.0 - 12.0 fL    Platelets 345 140 - 450 10*3/mm3    Neutrophil % 76.3 (H) 42.7 - 76.0 %    Lymphocyte % 9.0 (L) 19.6 - 45.3 %    Monocyte % 13.4 (H) 5.0 - 12.0 %    Eosinophil % 0.0 (L) 0.3 - 6.2 %    Basophil % 0.2 0.0 - 1.5 %    Immature Grans % 1.1 (H) 0.0 - 0.5 %    Neutrophils, Absolute 7.69 (H) 1.70 - 7.00 10*3/mm3    Lymphocytes, Absolute 0.91 0.70 - 3.10 10*3/mm3    Monocytes, Absolute 1.35 (H) 0.10 - 0.90 10*3/mm3    Eosinophils, Absolute 0.00 0.00 - 0.40 10*3/mm3    Basophils, Absolute 0.02 0.00 - 0.20 10*3/mm3    Immature Grans, Absolute 0.11 (H) 0.00 - 0.05 10*3/mm3    nRBC 0.0 0.0 - 0.2 /100 WBC   Lipase    Collection Time: 09/06/19  9:32 AM   Result Value Ref Range    Lipase 12 (L) 13 - 60 U/L   Troponin    Collection Time: 09/06/19 11:33 AM   Result Value Ref Range    Troponin T <0.010 0.000 - 0.030 ng/mL       I ordered the above labs and reviewed the results        RADIOLOGY  CT Abdomen Pelvis With Contrast   Preliminary Result   1. There is likely acute cholecystitis. There is no biliary  dilatation.   2. There is moderately extensive sigmoid diverticulosis without evidence   for diverticulitis. There is no evidence for appendicitis.   3. Single 7 mm nonobstructing stone at the upper pole of the left   kidney.       Discussed with Dr. Brown.          XR Chest 2 View   Final Result   No active disease in the chest.       This report was finalized on 9/6/2019 11:09 AM by Dr. Guero Hazel M.D.              I ordered the above noted radiological studies and reviewed the images on the PACS system.         MEDICATIONS GIVEN IN ER  Medications   sodium chloride 0.9 % flush 10 mL (not administered)   nitroglycerin (NITROSTAT) SL tablet 0.4 mg (0.4 mg Sublingual Given 9/6/19 1032)   levoFLOXacin (LEVAQUIN) 750 mg/150 mL D5W (premix) (LEVAQUIN) 750 mg (not administered)   aspirin tablet 325 mg (325 mg Oral Given 9/6/19 1008)   iopamidol (ISOVUE-300) 61 % injection 100 mL (85 mL Intravenous Given by Other 9/6/19 1053)           EKG  Interpreted by ED Physician. Please see their document for interpretation.         PROCEDURES  Procedures        COURSE & MEDICAL DECISION MAKING  Pertinent Labs and Imaging studies that were ordered and reviewed are noted above.  Results were reviewed/discussed with the patient and they were also made aware of online access.  Pt also made aware that some labs, such as cultures, will not be resulted during ER visit and follow up with PMD is necessary.         PROGRESS AND CONSULTS    Progress Notes:       0956 Reviewed pt's history and workup with Dr. Brown (ER physician).  After a bedside evaluation, Dr. Brown agrees with the plan of care.    1030 Rechecked pt. Pt is resting comfortably. Notified pt of negative troponin test. Discussed the plan to repeat troponin and obtain CT scan for further evaluation. Pt understands and agrees with the plan, all questions answered.    1230 Rechecked pt. Pt is resting comfortably. Notified pt of negative Troponin, but that his CT  "scan is suspicious for acute cholecystitis. Discussed the plan to speak with Surgery. Pt understands and agrees with the plan, all questions answered.    1257 Received a call from Dr. Travis (General Surgeon) and discussed patient's case. Dr. Travis would prefer Medicine to admit the pt, but would like to operate on the pt tomorrow.     1300 Rechecked pt. Pt is resting comfortably. Notified pt that I spoke with Surgery and that they would like for the pt to be admitted for cholecystectomy to be performed tomorrow. Pt and family agree with the plan and all questions were addressed.    1314 Received a call from Senia (NP for [facility]) and discussed patient's case. Senia agrees with the plan to admit the pt under the care of Dr. Nicole.       Disposition vitals:  /71   Pulse 65   Temp 98.1 °F (36.7 °C) (Tympanic)   Resp 15   Ht 172.7 cm (68\")   Wt 81.6 kg (180 lb)   SpO2 94%   BMI 27.37 kg/m²         DIAGNOSIS  Final diagnoses:   Acute cholecystitis         DISPOSITION  ADMISSION    Discussed treatment plan and reason for admission with pt/family and admitting physician.  Pt/family voiced understanding of the plan for admission for further testing/treatment as needed.         Documentation assistance provided by noé Garcia for Zach Upton PA-C.  Information recorded by the noé was done at my direction and has been verified and validated by me.                    Rj Garcia  09/06/19 2152       Zach Upton PA  09/06/19 1610    "

## 2019-09-06 NOTE — CONSULTS
General Surgery Consultation    Consulting Physician: Gauri Travis MD  Referring Physician: WINSTON Rowe and MY Da Silva    Reason for consultation: Possible acute cholecystitis    CC: Abdominal pain/chest pain    HPI:   The patient is a very pleasant 73 y.o. male that presented to the hospital with a 48-hour history of pain located right at the juncture between his chest and abdomen within the epigastrium.  He says the pain at times will feel like a dull soreness but with activity and bending over he will feel more of a sharp stabbing sensation.  He has had similar pain on 3 separate occasions that usually would last about an hour and then spontaneously resolved.  The pain came on while he was mowing his grass and he had just eaten a bagel for breakfast.  He has not had much of an appetite since the pain started and noticed that the only thing alleviating the pain is rest.  He came to the emergency room for work-up, thinking the pain may be cardiac in nature and he was found to have normal troponins x2 but a CT of the abdomen and pelvis demonstrated what appeared to be acute cholecystitis.  He has been admitted for further management.     Past Medical History:  Coronary artery disease status post three-vessel CABG  Carotid artery stenosis  Hypertension  Hyperlipidemia  Degenerative disc disease  GERD  Mandel's esophagus  Atrial flutter that is post ablation  Osteoarthritis  History of stroke    Past Surgical History:  Cardiac catheterization (2017)  CABG (2017, Dr. Cortez)  Multiple EGDs for Mandel's esophagus  Vasectomy  Colonoscopy (2015-diverticulosis and tubular adenoma x1; 2019-diverticulosis, internal hemorrhoids, adenomatous polyps x2)    Medications:  Medications Prior to Admission   Medication Sig Dispense Refill Last Dose   • albuterol sulfate  (90 Base) MCG/ACT inhaler Inhale 2 puffs Every 4 (Four) Hours As Needed for Wheezing. 1 inhaler 0 Past Week at Unknown time   •  clopidogrel (PLAVIX) 75 MG tablet Take 1 tablet by mouth Daily. 90 tablet 1 Past Week at Unknown time   • HYDROcodone-Acetaminophen (VICODIN) 5-300 MG per tablet Take 1 tablet by mouth 2 (Two) Times a Day. 60 tablet 0 Past Month at Unknown time   • nitroglycerin (NITROSTAT) 0.4 MG SL tablet Place 1 tablet under the tongue Every 5 (Five) Minutes As Needed for Chest Pain. Take no more than 3 doses in 15 minutes. 20 tablet 2 Past Week at Unknown time   • pantoprazole (PROTONIX) 40 MG EC tablet Take 1 tablet by mouth Daily. 90 tablet 1 Past Week at Unknown time   • ramipril (ALTACE) 5 MG capsule Take 1 capsule by mouth Daily. 90 capsule 1 Past Week at Unknown time   • rosuvastatin (CRESTOR) 10 MG tablet Take 1 tablet by mouth Daily. 90 tablet 1 Past Week at Unknown time       Allergies: Penicillin (hives) and atorvastatin (myalgias)    Social History: , former smoker, social alcohol use    Family History: Cousin with pancreatic cancer, brother with gallbladder disease, no other family history of gastrointestinal malignancy that he knows of    Review of Systems:  Constitutional: denies any weight changes, fever, and chills  Eyes: denies blurred or double vision; denies scleral icterus  Cardiovascular: denies chest pain, palpitations, edemas.  Respiratory: denies cough, sputum, SOB.  Gastrointestinal: Positive for abdominal pain and nausea; denies vomiting, reflux, melena, or hematochezia  Genitourinary: denies dysuria, or hematuria.  Endocrine: denies cold intolerance, lethargy and flushing.  Hematologic: denies excessive bruising or bleeding.  Musculoskeletal: Positive for weakness; denies joint swelling, pain or stiffness.  Neurologic: denies seizures, CVA, paresthesia, or peripheral neuropathy.   Skin: denies change in nevi, rashes, masses, or jaundice     All other systems reviewed and were negative.    Physical Exam:   Vitals:    09/06/19 1538   BP: 128/62   Pulse: 64   Resp: 16   Temp: 96.7 °F (35.9 °C)    SpO2: 95%     Height: 172 cm  Weight: 77 kg  BMI: 25  GENERAL: awake and alert, no acute distress, oriented to person, place, and time  HEENT: normocephalic, atraumatic, no scleral icterus, moist mucous membranes.  NECK: Supple, there is no thyromegaly or lymphadenopathy  RESPIRATORY: clear to auscultation, no wheezes, rales or rhonchi, symmetric air entry  CARDIOVASCULAR: regular rate and rhythm    GASTROINTESTINAL: Soft, nondistended, mild epigastric tenderness, no specific right upper quadrant tenderness appreciable  MUSCULOSKELETAL: no cyanosis, clubbing, or edema   NEUROLOGIC: alert and oriented, normal speech, cranial nerves 2-12 grossly intact, no focal deficits   SKIN: Moist, warm, no rashes, no jaundice.      Diagnostic workup:     Pertinent labs:   Results from last 7 days   Lab Units 09/06/19  0932   WBC 10*3/mm3 10.08   HEMOGLOBIN g/dL 16.4   HEMATOCRIT % 50.8   PLATELETS 10*3/mm3 345     Results from last 7 days   Lab Units 09/06/19  0932   SODIUM mmol/L 138   POTASSIUM mmol/L 4.5   CHLORIDE mmol/L 99   CO2 mmol/L 25.4   BUN mg/dL 14   CREATININE mg/dL 1.00   CALCIUM mg/dL 9.4   BILIRUBIN mg/dL 1.7*   ALK PHOS U/L 99   ALT (SGPT) U/L 23   AST (SGOT) U/L 35   GLUCOSE mg/dL 99       IMAGING:  CT ABDOMEN/PELVIS:  IMPRESSION:  1. There is likely acute cholecystitis. There is no biliary dilatation.  2. There is moderately extensive sigmoid diverticulosis without evidence for diverticulitis. There is no evidence for appendicitis.  3. Single 7 mm nonobstructing stone at the upper pole of the left kidney.    I personally have reviewed the above imaging and found the following additional findings: Early acute cholecystitis with pericholecystic fluid and some gallbladder wall enhancement on CT scan    Assessment and plan:     The patient is a 73 y.o. male with early acute cholecystitis.     I reviewed his CT scan and I recommended we proceed with a laparoscopic cholecystectomy with cholangiogram tomorrow  morning.  He has already received 1 dose of empiric Levaquin and I will give him another dose preoperatively tomorrow morning.  If all goes well, he may be stable for discharge home as early as Sunday morning.  I discussed the risks of the procedure to include bleeding, possible common bile duct injury, and possible postoperative bile leak.  Despite these risks, he has consented to proceed.  He should remain n.p.o. after midnight tonight but can have a diet as tolerated today from my standpoint.  This case was discussed with Alexander Nicole MD. Thank you very much for this consult, I am happy to assist in this patient's care.    Gauri Travis MD  General and Endoscopic Surgery  [facility]    [address], [address]  [place], [place], [address]  P: [phone]  F: [phone]

## 2019-09-07 ENCOUNTER — ANESTHESIA (OUTPATIENT)
Dept: PERIOP | Facility: HOSPITAL | Age: 74
End: 2019-09-07

## 2019-09-07 ENCOUNTER — APPOINTMENT (OUTPATIENT)
Dept: GENERAL RADIOLOGY | Facility: HOSPITAL | Age: 74
End: 2019-09-07

## 2019-09-07 LAB
ALBUMIN SERPL-MCNC: 4 G/DL (ref 3.5–5.2)
ALBUMIN/GLOB SERPL: 1.4 G/DL
ALP SERPL-CCNC: 106 U/L (ref 39–117)
ALT SERPL W P-5'-P-CCNC: 30 U/L (ref 1–41)
ANION GAP SERPL CALCULATED.3IONS-SCNC: 8.6 MMOL/L (ref 5–15)
AST SERPL-CCNC: 46 U/L (ref 1–40)
BASOPHILS # BLD AUTO: 0.02 10*3/MM3 (ref 0–0.2)
BASOPHILS NFR BLD AUTO: 0.3 % (ref 0–1.5)
BILIRUB SERPL-MCNC: 0.8 MG/DL (ref 0.2–1.2)
BUN BLD-MCNC: 15 MG/DL (ref 8–23)
BUN/CREAT SERPL: 16.1 (ref 7–25)
CALCIUM SPEC-SCNC: 9 MG/DL (ref 8.6–10.5)
CHLORIDE SERPL-SCNC: 98 MMOL/L (ref 98–107)
CO2 SERPL-SCNC: 26.4 MMOL/L (ref 22–29)
CREAT BLD-MCNC: 0.93 MG/DL (ref 0.76–1.27)
DEPRECATED RDW RBC AUTO: 51.3 FL (ref 37–54)
EOSINOPHIL # BLD AUTO: 0.03 10*3/MM3 (ref 0–0.4)
EOSINOPHIL NFR BLD AUTO: 0.4 % (ref 0.3–6.2)
ERYTHROCYTE [DISTWIDTH] IN BLOOD BY AUTOMATED COUNT: 14.6 % (ref 12.3–15.4)
GFR SERPL CREATININE-BSD FRML MDRD: 79 ML/MIN/1.73
GLOBULIN UR ELPH-MCNC: 2.9 GM/DL
GLUCOSE BLD-MCNC: 119 MG/DL (ref 65–99)
HCT VFR BLD AUTO: 51.3 % (ref 37.5–51)
HGB BLD-MCNC: 16.1 G/DL (ref 13–17.7)
IMM GRANULOCYTES # BLD AUTO: 0.18 10*3/MM3 (ref 0–0.05)
IMM GRANULOCYTES NFR BLD AUTO: 2.4 % (ref 0–0.5)
LYMPHOCYTES # BLD AUTO: 0.92 10*3/MM3 (ref 0.7–3.1)
LYMPHOCYTES NFR BLD AUTO: 12.4 % (ref 19.6–45.3)
MCH RBC QN AUTO: 29.8 PG (ref 26.6–33)
MCHC RBC AUTO-ENTMCNC: 31.4 G/DL (ref 31.5–35.7)
MCV RBC AUTO: 94.8 FL (ref 79–97)
MONOCYTES # BLD AUTO: 1.35 10*3/MM3 (ref 0.1–0.9)
MONOCYTES NFR BLD AUTO: 18.2 % (ref 5–12)
NEUTROPHILS # BLD AUTO: 4.93 10*3/MM3 (ref 1.7–7)
NEUTROPHILS NFR BLD AUTO: 66.3 % (ref 42.7–76)
NRBC BLD AUTO-RTO: 0 /100 WBC (ref 0–0.2)
PLATELET # BLD AUTO: 343 10*3/MM3 (ref 140–450)
PMV BLD AUTO: 10.5 FL (ref 6–12)
POTASSIUM BLD-SCNC: 4.8 MMOL/L (ref 3.5–5.2)
PROT SERPL-MCNC: 6.9 G/DL (ref 6–8.5)
RBC # BLD AUTO: 5.41 10*6/MM3 (ref 4.14–5.8)
SODIUM BLD-SCNC: 133 MMOL/L (ref 136–145)
WBC NRBC COR # BLD: 7.43 10*3/MM3 (ref 3.4–10.8)

## 2019-09-07 PROCEDURE — 0FT44ZZ RESECTION OF GALLBLADDER, PERCUTANEOUS ENDOSCOPIC APPROACH: ICD-10-PCS | Performed by: SURGERY

## 2019-09-07 PROCEDURE — 88304 TISSUE EXAM BY PATHOLOGIST: CPT | Performed by: SURGERY

## 2019-09-07 PROCEDURE — 25010000002 DEXAMETHASONE PER 1 MG: Performed by: NURSE ANESTHETIST, CERTIFIED REGISTERED

## 2019-09-07 PROCEDURE — BF101ZZ FLUOROSCOPY OF BILE DUCTS USING LOW OSMOLAR CONTRAST: ICD-10-PCS | Performed by: SURGERY

## 2019-09-07 PROCEDURE — 25010000002 PROPOFOL 10 MG/ML EMULSION: Performed by: NURSE ANESTHETIST, CERTIFIED REGISTERED

## 2019-09-07 PROCEDURE — 25010000002 LEVOFLOXACIN PER 250 MG: Performed by: SURGERY

## 2019-09-07 PROCEDURE — 99221 1ST HOSP IP/OBS SF/LOW 40: CPT | Performed by: INTERNAL MEDICINE

## 2019-09-07 PROCEDURE — 25010000002 ONDANSETRON PER 1 MG: Performed by: NURSE ANESTHETIST, CERTIFIED REGISTERED

## 2019-09-07 PROCEDURE — 74300 X-RAY BILE DUCTS/PANCREAS: CPT

## 2019-09-07 PROCEDURE — 25010000002 HYDROMORPHONE PER 4 MG: Performed by: NURSE ANESTHETIST, CERTIFIED REGISTERED

## 2019-09-07 PROCEDURE — 80053 COMPREHEN METABOLIC PANEL: CPT | Performed by: SURGERY

## 2019-09-07 PROCEDURE — 25010000002 FENTANYL CITRATE (PF) 100 MCG/2ML SOLUTION: Performed by: NURSE ANESTHETIST, CERTIFIED REGISTERED

## 2019-09-07 PROCEDURE — 47563 LAPARO CHOLECYSTECTOMY/GRAPH: CPT | Performed by: SURGERY

## 2019-09-07 PROCEDURE — 0 IOTHALAMATE 60 % SOLUTION: Performed by: SURGERY

## 2019-09-07 PROCEDURE — 85025 COMPLETE CBC W/AUTO DIFF WBC: CPT | Performed by: SURGERY

## 2019-09-07 PROCEDURE — 25010000002 NEOSTIGMINE PER 0.5 MG: Performed by: NURSE ANESTHETIST, CERTIFIED REGISTERED

## 2019-09-07 RX ORDER — PROPOFOL 10 MG/ML
VIAL (ML) INTRAVENOUS AS NEEDED
Status: DISCONTINUED | OUTPATIENT
Start: 2019-09-07 | End: 2019-09-07 | Stop reason: SURG

## 2019-09-07 RX ORDER — ACETAMINOPHEN 325 MG/1
650 TABLET ORAL ONCE AS NEEDED
Status: DISCONTINUED | OUTPATIENT
Start: 2019-09-07 | End: 2019-09-07 | Stop reason: HOSPADM

## 2019-09-07 RX ORDER — MAGNESIUM HYDROXIDE 1200 MG/15ML
LIQUID ORAL AS NEEDED
Status: DISCONTINUED | OUTPATIENT
Start: 2019-09-07 | End: 2019-09-07 | Stop reason: HOSPADM

## 2019-09-07 RX ORDER — SODIUM CHLORIDE 0.9 % (FLUSH) 0.9 %
3-10 SYRINGE (ML) INJECTION AS NEEDED
Status: DISCONTINUED | OUTPATIENT
Start: 2019-09-07 | End: 2019-09-07 | Stop reason: HOSPADM

## 2019-09-07 RX ORDER — FLUMAZENIL 0.1 MG/ML
0.2 INJECTION INTRAVENOUS AS NEEDED
Status: DISCONTINUED | OUTPATIENT
Start: 2019-09-07 | End: 2019-09-07 | Stop reason: HOSPADM

## 2019-09-07 RX ORDER — PROMETHAZINE HYDROCHLORIDE 25 MG/1
25 SUPPOSITORY RECTAL ONCE AS NEEDED
Status: DISCONTINUED | OUTPATIENT
Start: 2019-09-07 | End: 2019-09-07 | Stop reason: HOSPADM

## 2019-09-07 RX ORDER — DEXAMETHASONE SODIUM PHOSPHATE 10 MG/ML
INJECTION INTRAMUSCULAR; INTRAVENOUS AS NEEDED
Status: DISCONTINUED | OUTPATIENT
Start: 2019-09-07 | End: 2019-09-07 | Stop reason: SURG

## 2019-09-07 RX ORDER — FENTANYL CITRATE 50 UG/ML
INJECTION, SOLUTION INTRAMUSCULAR; INTRAVENOUS
Status: COMPLETED
Start: 2019-09-07 | End: 2019-09-07

## 2019-09-07 RX ORDER — LIDOCAINE HYDROCHLORIDE 10 MG/ML
0.5 INJECTION, SOLUTION EPIDURAL; INFILTRATION; INTRACAUDAL; PERINEURAL ONCE AS NEEDED
Status: DISCONTINUED | OUTPATIENT
Start: 2019-09-07 | End: 2019-09-07 | Stop reason: HOSPADM

## 2019-09-07 RX ORDER — OXYCODONE AND ACETAMINOPHEN 7.5; 325 MG/1; MG/1
1 TABLET ORAL ONCE AS NEEDED
Status: DISCONTINUED | OUTPATIENT
Start: 2019-09-07 | End: 2019-09-07 | Stop reason: HOSPADM

## 2019-09-07 RX ORDER — BUPIVACAINE HYDROCHLORIDE AND EPINEPHRINE 5; 5 MG/ML; UG/ML
INJECTION, SOLUTION EPIDURAL; INTRACAUDAL; PERINEURAL AS NEEDED
Status: DISCONTINUED | OUTPATIENT
Start: 2019-09-07 | End: 2019-09-07 | Stop reason: HOSPADM

## 2019-09-07 RX ORDER — LEVOFLOXACIN 5 MG/ML
750 INJECTION, SOLUTION INTRAVENOUS
Status: COMPLETED | OUTPATIENT
Start: 2019-09-07 | End: 2019-09-07

## 2019-09-07 RX ORDER — DEXTROSE, SODIUM CHLORIDE, AND POTASSIUM CHLORIDE 5; .45; .15 G/100ML; G/100ML; G/100ML
50 INJECTION INTRAVENOUS CONTINUOUS
Status: DISCONTINUED | OUTPATIENT
Start: 2019-09-07 | End: 2019-09-08

## 2019-09-07 RX ORDER — PROMETHAZINE HYDROCHLORIDE 25 MG/ML
6.25 INJECTION, SOLUTION INTRAMUSCULAR; INTRAVENOUS
Status: DISCONTINUED | OUTPATIENT
Start: 2019-09-07 | End: 2019-09-07 | Stop reason: HOSPADM

## 2019-09-07 RX ORDER — ONDANSETRON 2 MG/ML
4 INJECTION INTRAMUSCULAR; INTRAVENOUS ONCE AS NEEDED
Status: DISCONTINUED | OUTPATIENT
Start: 2019-09-07 | End: 2019-09-07 | Stop reason: HOSPADM

## 2019-09-07 RX ORDER — LIDOCAINE HYDROCHLORIDE 40 MG/ML
SOLUTION TOPICAL
Status: COMPLETED
Start: 2019-09-07 | End: 2019-09-07

## 2019-09-07 RX ORDER — GLYCOPYRROLATE 0.2 MG/ML
INJECTION INTRAMUSCULAR; INTRAVENOUS AS NEEDED
Status: DISCONTINUED | OUTPATIENT
Start: 2019-09-07 | End: 2019-09-07 | Stop reason: SURG

## 2019-09-07 RX ORDER — SODIUM CHLORIDE, SODIUM LACTATE, POTASSIUM CHLORIDE, CALCIUM CHLORIDE 600; 310; 30; 20 MG/100ML; MG/100ML; MG/100ML; MG/100ML
9 INJECTION, SOLUTION INTRAVENOUS CONTINUOUS
Status: DISCONTINUED | OUTPATIENT
Start: 2019-09-07 | End: 2019-09-07

## 2019-09-07 RX ORDER — EPHEDRINE SULFATE 50 MG/ML
INJECTION, SOLUTION INTRAVENOUS AS NEEDED
Status: DISCONTINUED | OUTPATIENT
Start: 2019-09-07 | End: 2019-09-07 | Stop reason: SURG

## 2019-09-07 RX ORDER — ROCURONIUM BROMIDE 10 MG/ML
INJECTION, SOLUTION INTRAVENOUS AS NEEDED
Status: DISCONTINUED | OUTPATIENT
Start: 2019-09-07 | End: 2019-09-07 | Stop reason: SURG

## 2019-09-07 RX ORDER — NALOXONE HCL 0.4 MG/ML
0.2 VIAL (ML) INJECTION AS NEEDED
Status: DISCONTINUED | OUTPATIENT
Start: 2019-09-07 | End: 2019-09-07 | Stop reason: HOSPADM

## 2019-09-07 RX ORDER — LIDOCAINE HYDROCHLORIDE 20 MG/ML
INJECTION, SOLUTION INFILTRATION; PERINEURAL AS NEEDED
Status: DISCONTINUED | OUTPATIENT
Start: 2019-09-07 | End: 2019-09-07 | Stop reason: SURG

## 2019-09-07 RX ORDER — PROMETHAZINE HYDROCHLORIDE 25 MG/1
25 TABLET ORAL ONCE AS NEEDED
Status: DISCONTINUED | OUTPATIENT
Start: 2019-09-07 | End: 2019-09-07 | Stop reason: HOSPADM

## 2019-09-07 RX ORDER — DIPHENHYDRAMINE HYDROCHLORIDE 50 MG/ML
12.5 INJECTION INTRAMUSCULAR; INTRAVENOUS
Status: DISCONTINUED | OUTPATIENT
Start: 2019-09-07 | End: 2019-09-07 | Stop reason: HOSPADM

## 2019-09-07 RX ORDER — SODIUM CHLORIDE 9 MG/ML
INJECTION, SOLUTION INTRAVENOUS CONTINUOUS PRN
Status: DISCONTINUED | OUTPATIENT
Start: 2019-09-07 | End: 2019-09-07 | Stop reason: SURG

## 2019-09-07 RX ORDER — DIPHENHYDRAMINE HCL 25 MG
25 CAPSULE ORAL
Status: DISCONTINUED | OUTPATIENT
Start: 2019-09-07 | End: 2019-09-07 | Stop reason: HOSPADM

## 2019-09-07 RX ORDER — HYDRALAZINE HYDROCHLORIDE 20 MG/ML
5 INJECTION INTRAMUSCULAR; INTRAVENOUS
Status: DISCONTINUED | OUTPATIENT
Start: 2019-09-07 | End: 2019-09-07 | Stop reason: HOSPADM

## 2019-09-07 RX ORDER — SODIUM CHLORIDE 9 MG/ML
INJECTION, SOLUTION INTRAVENOUS AS NEEDED
Status: DISCONTINUED | OUTPATIENT
Start: 2019-09-07 | End: 2019-09-07 | Stop reason: HOSPADM

## 2019-09-07 RX ORDER — EPHEDRINE SULFATE 50 MG/ML
5 INJECTION, SOLUTION INTRAVENOUS ONCE AS NEEDED
Status: DISCONTINUED | OUTPATIENT
Start: 2019-09-07 | End: 2019-09-07 | Stop reason: HOSPADM

## 2019-09-07 RX ORDER — LABETALOL HYDROCHLORIDE 5 MG/ML
5 INJECTION, SOLUTION INTRAVENOUS
Status: DISCONTINUED | OUTPATIENT
Start: 2019-09-07 | End: 2019-09-07 | Stop reason: HOSPADM

## 2019-09-07 RX ORDER — FENTANYL CITRATE 50 UG/ML
INJECTION, SOLUTION INTRAMUSCULAR; INTRAVENOUS AS NEEDED
Status: DISCONTINUED | OUTPATIENT
Start: 2019-09-07 | End: 2019-09-07 | Stop reason: SURG

## 2019-09-07 RX ORDER — PROMETHAZINE HYDROCHLORIDE 25 MG/ML
12.5 INJECTION, SOLUTION INTRAMUSCULAR; INTRAVENOUS ONCE AS NEEDED
Status: DISCONTINUED | OUTPATIENT
Start: 2019-09-07 | End: 2019-09-07 | Stop reason: HOSPADM

## 2019-09-07 RX ORDER — FENTANYL CITRATE 50 UG/ML
50 INJECTION, SOLUTION INTRAMUSCULAR; INTRAVENOUS
Status: DISCONTINUED | OUTPATIENT
Start: 2019-09-07 | End: 2019-09-07 | Stop reason: HOSPADM

## 2019-09-07 RX ORDER — HYDROCODONE BITARTRATE AND ACETAMINOPHEN 5; 325 MG/1; MG/1
1 TABLET ORAL EVERY 4 HOURS PRN
Status: DISCONTINUED | OUTPATIENT
Start: 2019-09-07 | End: 2019-09-08 | Stop reason: HOSPADM

## 2019-09-07 RX ORDER — ONDANSETRON 2 MG/ML
INJECTION INTRAMUSCULAR; INTRAVENOUS AS NEEDED
Status: DISCONTINUED | OUTPATIENT
Start: 2019-09-07 | End: 2019-09-07 | Stop reason: SURG

## 2019-09-07 RX ORDER — HYDROMORPHONE HCL 110MG/55ML
PATIENT CONTROLLED ANALGESIA SYRINGE INTRAVENOUS AS NEEDED
Status: DISCONTINUED | OUTPATIENT
Start: 2019-09-07 | End: 2019-09-07 | Stop reason: SURG

## 2019-09-07 RX ORDER — SODIUM CHLORIDE 0.9 % (FLUSH) 0.9 %
3 SYRINGE (ML) INJECTION EVERY 12 HOURS SCHEDULED
Status: DISCONTINUED | OUTPATIENT
Start: 2019-09-07 | End: 2019-09-07 | Stop reason: HOSPADM

## 2019-09-07 RX ORDER — LIDOCAINE HYDROCHLORIDE 40 MG/ML
SOLUTION TOPICAL AS NEEDED
Status: DISCONTINUED | OUTPATIENT
Start: 2019-09-07 | End: 2019-09-07 | Stop reason: SURG

## 2019-09-07 RX ORDER — FAMOTIDINE 10 MG/ML
20 INJECTION, SOLUTION INTRAVENOUS ONCE
Status: COMPLETED | OUTPATIENT
Start: 2019-09-07 | End: 2019-09-07

## 2019-09-07 RX ORDER — HYDROCODONE BITARTRATE AND ACETAMINOPHEN 7.5; 325 MG/1; MG/1
1 TABLET ORAL ONCE AS NEEDED
Status: DISCONTINUED | OUTPATIENT
Start: 2019-09-07 | End: 2019-09-07 | Stop reason: HOSPADM

## 2019-09-07 RX ORDER — HYDROMORPHONE HYDROCHLORIDE 1 MG/ML
0.5 INJECTION, SOLUTION INTRAMUSCULAR; INTRAVENOUS; SUBCUTANEOUS
Status: DISCONTINUED | OUTPATIENT
Start: 2019-09-07 | End: 2019-09-07 | Stop reason: HOSPADM

## 2019-09-07 RX ADMIN — ONDANSETRON 4 MG: 2 INJECTION INTRAMUSCULAR; INTRAVENOUS at 08:39

## 2019-09-07 RX ADMIN — HYDROMORPHONE HYDROCHLORIDE 0.5 MG: 2 INJECTION INTRAMUSCULAR; INTRAVENOUS; SUBCUTANEOUS at 08:11

## 2019-09-07 RX ADMIN — FAMOTIDINE 20 MG: 10 INJECTION INTRAVENOUS at 07:39

## 2019-09-07 RX ADMIN — LIDOCAINE HYDROCHLORIDE 20 MG: 20 INJECTION, SOLUTION INFILTRATION; PERINEURAL at 08:04

## 2019-09-07 RX ADMIN — RAMIPRIL 5 MG: 5 CAPSULE ORAL at 10:59

## 2019-09-07 RX ADMIN — FENTANYL CITRATE 25 MCG: 50 INJECTION INTRAMUSCULAR; INTRAVENOUS at 08:29

## 2019-09-07 RX ADMIN — EPHEDRINE SULFATE 5 MG: 50 INJECTION INTRAMUSCULAR; INTRAVENOUS; SUBCUTANEOUS at 08:22

## 2019-09-07 RX ADMIN — SODIUM CHLORIDE, POTASSIUM CHLORIDE, SODIUM LACTATE AND CALCIUM CHLORIDE 9 ML/HR: 600; 310; 30; 20 INJECTION, SOLUTION INTRAVENOUS at 07:39

## 2019-09-07 RX ADMIN — POTASSIUM CHLORIDE, DEXTROSE MONOHYDRATE AND SODIUM CHLORIDE 50 ML/HR: 150; 5; 450 INJECTION, SOLUTION INTRAVENOUS at 10:59

## 2019-09-07 RX ADMIN — SODIUM CHLORIDE: 9 INJECTION, SOLUTION INTRAVENOUS at 08:20

## 2019-09-07 RX ADMIN — HYDROMORPHONE HYDROCHLORIDE 0.5 MG: 2 INJECTION INTRAMUSCULAR; INTRAVENOUS; SUBCUTANEOUS at 08:39

## 2019-09-07 RX ADMIN — FENTANYL CITRATE 50 MCG: 50 INJECTION INTRAMUSCULAR; INTRAVENOUS at 08:02

## 2019-09-07 RX ADMIN — EPHEDRINE SULFATE 10 MG: 50 INJECTION INTRAMUSCULAR; INTRAVENOUS; SUBCUTANEOUS at 08:14

## 2019-09-07 RX ADMIN — PROPOFOL 150 MG: 10 INJECTION, EMULSION INTRAVENOUS at 08:04

## 2019-09-07 RX ADMIN — GLYCOPYRROLATE 0.4 MG: 0.2 INJECTION INTRAMUSCULAR; INTRAVENOUS at 08:50

## 2019-09-07 RX ADMIN — FENTANYL CITRATE 25 MCG: 50 INJECTION INTRAMUSCULAR; INTRAVENOUS at 08:33

## 2019-09-07 RX ADMIN — GLYCOPYRROLATE 0.2 MG: 0.2 INJECTION INTRAMUSCULAR; INTRAVENOUS at 08:31

## 2019-09-07 RX ADMIN — HYDROCODONE BITARTRATE AND ACETAMINOPHEN 1 TABLET: 5; 325 TABLET ORAL at 10:59

## 2019-09-07 RX ADMIN — LEVOFLOXACIN 750 MG: 5 INJECTION, SOLUTION INTRAVENOUS at 07:51

## 2019-09-07 RX ADMIN — NEOSTIGMINE METHYLSULFATE 4 MG: 1 INJECTION INTRAMUSCULAR; INTRAVENOUS; SUBCUTANEOUS at 08:50

## 2019-09-07 RX ADMIN — DEXAMETHASONE SODIUM PHOSPHATE 8 MG: 10 INJECTION INTRAMUSCULAR; INTRAVENOUS at 08:10

## 2019-09-07 RX ADMIN — LIDOCAINE HYDROCHLORIDE 1 EACH: 40 SOLUTION TOPICAL at 08:06

## 2019-09-07 RX ADMIN — ROCURONIUM BROMIDE 40 MG: 10 INJECTION INTRAVENOUS at 08:04

## 2019-09-07 NOTE — PLAN OF CARE
Problem: Patient Care Overview  Goal: Plan of Care Review  Outcome: Ongoing (interventions implemented as appropriate)   09/07/19 3940   Coping/Psychosocial   Plan of Care Reviewed With patient   Plan of Care Review   Progress improving   OTHER   Outcome Summary Pt doing well after surgery, voiding easily and without issue, ambulated several times in room and twice around unit, tolerating regular diet. Lortab given x1 with relief. Pt hoping to d/c tomorrow. Will monitor.     Goal: Individualization and Mutuality  Outcome: Ongoing (interventions implemented as appropriate)

## 2019-09-07 NOTE — ANESTHESIA PREPROCEDURE EVALUATION
Anesthesia Evaluation     Patient summary reviewed and Nursing notes reviewed   no history of anesthetic complications:  NPO Solid Status: > 6 hours             Airway   Mallampati: III  TM distance: >3 FB  Neck ROM: full  No difficulty expected  Dental    (+) upper dentures and partials    Pulmonary - normal exam   (+) a smoker Former,   Cardiovascular - normal exam    ECG reviewed    (+) hypertension, CABG (2017), dysrhythmias Atrial Fib,  carotid artery disease      Neuro/Psych  (+) TIA,     GI/Hepatic/Renal/Endo    (+)  GERD,      Musculoskeletal     Abdominal    Substance History      OB/GYN          Other   (+) blood dyscrasia (plavix)                     Anesthesia Plan    ASA 3     general     Anesthetic plan, all risks, benefits, and alternatives have been provided, discussed and informed consent has been obtained with: patient.

## 2019-09-07 NOTE — CONSULTS
Patient Name: Madhu Santoro  Age/Sex: 73 y.o. male  : 1945  MRN: [id number]    Date of Admission: 2019  Date of Encounter Visit: 19  Encounter Provider: Jordon Eid MD  Place of Service: [facility] CARDIOLOGY      Referring Provider: Alexander Nicole MD  Patient Care Team:  Damian Chen MD as PCP - General (Family Medicine)  Damian Chen MD as PCP - Claims Attributed  Temo Cortez MD as Surgeon (Cardiothoracic Surgery)    Subjective:     Consulted for: Epigastric pain and Pre-operative clearance    Chief Complaint: Acute cholecystitis       History of Present Illness:  73 y.o. male patient of Dr Wright's with a history of coronary artery disease (s/pCABG x 3 ), atrial flutter (s/p ablation ), CVA, hypertension, and carotid disease (s/p endarterectomy).  He was previously followed by Dr [facility] but changed to our office.      He was diagnosed with atrial flutter in 2017 when he presented to his PCP office with rapid heart rate for which he was asymptomatic.  He converted with amiodarone drip.  He underwent cardiac catheterization which showed left main stenosis and he underwent CABG by Dr Cortez.  Post-operatively he continued to have problems with atrial flutter and was seen by Dr Gustafson and had cavotricuspid isthmus atrial flutter ablation.     He was admitted yesterday with complaints of epigastric pain that started 3 days prior with nausea and weakness.  The pain improved after NTG given in ED but patient reported pain was different than his previous cardiac pain.  He denied any increase with deep inspiration or positional alteration.  CT of abdomen showed likely acute cholecystitis.  He was seen by general surgery and subsequently underwent laparoscopic cholecystectomy.    After cholecystectomy he states that his pain has resolved.  He has not had recent issues with chest pain upon exertion.   Cardiac biomarkers are negative      Previous testing:   ECHO 10/24/2017  · Left ventricular systolic function is normal. Calculated EF = 55.8%. Estimated EF was in agreement with the calculated EF. Normal left ventricular cavity size and wall thickness noted. All left ventricular wall segments contract normally.  · Left ventricular diastolic dysfunction is noted (grade II w/high LAP) consistent with pseudonormalization.  · The aortic valve is abnormal in structure. The valve exhibits sclerosis.  · Trace to mild tricuspid valve regurgitation is present. Estimated right ventricular systolic pressure from tricuspid regurgitation is normal (<35 mmHg).     Cardiac catheterization 04/10/2017  · Left ventricular systolic function is normal. Calculated EF = 55.8%. Estimated EF was in agreement with the calculated EF. Normal left ventricular cavity size and wall thickness noted. All left ventricular wall segments contract normally.  · Left ventricular diastolic dysfunction is noted (grade II w/high LAP) consistent with pseudonormalization.  · The aortic valve is abnormal in structure. The valve exhibits sclerosis.  · Trace to mild tricuspid valve regurgitation is present. Estimated right ventricular systolic pressure from tricuspid regurgitation is normal (<35 mmHg).     EP study 04/18/2017  Findings:  His atrial flutter was 230 ms.  It had proximal to distal CS activation and counterclockwise activation in the right atrium and was typical of a caval tricuspid isthmus-dependent flutter.     I made a 3-dimensional map.  I used this to ablate in the caval tricuspid isthmus.  I successfully burned from the tricuspid valve annulus to the IVC with elimination of right atrial flutter.  Coronary sinus pacing showed complete caval tricuspid isthmus block persisted through a 35 minute holding pattern.     The His bundle electrogram recorded a AH interval of 80 ms and HV of 35 ms.  The shortest cycle length maintaining one-to-one  antegrade conduction was 470. AVNERP 550 = 380 There was no VA conduction with ventricular pacing.     Conclusions  1 successful ablation of caval tricuspid isthmus-dependent flutter  2 normal AV node function  3 normal His-Purkinje function  4 no inducible SVT.    Past Medical History:  Past Medical History:   Diagnosis Date   • Anxiety    • Arthritis    • Atrial flutter (CMS/HCC)     Status post cavotricuspid isthmus ablation by Dr. Gustafson on 4/18/17   • Mandel esophagus    • Benign essential hypertension    • CAD (coronary artery disease)     3 vessel CABG 4/11/17 by Dr. Cortez: ROSARIO-prox LAD, SVG-OM1, SVG-OM3   • Carotid artery disease (CMS/HCC)     Status post carotid endarterectomy - USG 4/10/17: 50-59% NICHELLE, 1-15% LICA.    • Colonic polyp    • DDD (degenerative disc disease), lumbosacral    • GERD (gastroesophageal reflux disease)    • H/O bone density study 2013   • H/O complete eye exam 2014   • HLD (hyperlipidemia)    • Hypertension    • Lipid screening 05/31/2013   • Low back pain     [facility]   • Screening for prostate cancer 07/07/2015   • Stroke (CMS/HCC)        Past Surgical History:   Procedure Laterality Date   • CARDIAC CATHETERIZATION N/A 4/10/2017    Procedure: Left Heart Cath;  Surgeon: Marjorie Healy MD;  Location:  [facility];  Service:    • CARDIAC CATHETERIZATION N/A 4/10/2017    Procedure: Coronary angiography;  Surgeon: Marjorie Healy MD;  Location:  [facility];  Service:    • CARDIAC CATHETERIZATION N/A 4/10/2017    Procedure: Left ventriculography;  Surgeon: Marjorie Healy MD;  Location:  [facility];  Service:    • CARDIAC ELECTROPHYSIOLOGY PROCEDURE N/A 4/18/2017    Procedure: Ablation atrial flutter;  Surgeon: Jose Antonio Gustafson MD;  Location:  [facility];  Service:    • COLONOSCOPY  01/06/2015    Diverticulosis, one TA   • COLONOSCOPY N/A 2/14/2019    tics, NBIH, adenomatous polyp  x 2   • CORONARY ARTERY BYPASS GRAFT N/A 4/11/2017    Procedure: AR STERNOTOMY CORONARY ARTERY BYPASS GRAFT TIMES 3 USING LEFT INTERNAL MAMMARY ARTERY AND LEFT GREATER SAPHENOUS VEIN GRAFT PER ENDOSCOPIC VEIN HARVESTING AND PRP ;  Surgeon: Temo Cortez MD;  Location: [facility] OR;  Service:    • ENDOSCOPY  01/06/2015    HH, Mandel's esophagus   • ENDOSCOPY N/A 2/14/2019    Z line irregular, HH, Mandel's esophagus   • VASECTOMY         Home Medications:   Medications Prior to Admission   Medication Sig Dispense Refill Last Dose   • albuterol sulfate  (90 Base) MCG/ACT inhaler Inhale 2 puffs Every 4 (Four) Hours As Needed for Wheezing. 1 inhaler 0 Past Week at Unknown time   • clopidogrel (PLAVIX) 75 MG tablet Take 1 tablet by mouth Daily. 90 tablet 1 Past Week at Unknown time   • HYDROcodone-Acetaminophen (VICODIN) 5-300 MG per tablet Take 1 tablet by mouth 2 (Two) Times a Day. 60 tablet 0 Past Month at Unknown time   • nitroglycerin (NITROSTAT) 0.4 MG SL tablet Place 1 tablet under the tongue Every 5 (Five) Minutes As Needed for Chest Pain. Take no more than 3 doses in 15 minutes. 20 tablet 2 Past Week at Unknown time   • pantoprazole (PROTONIX) 40 MG EC tablet Take 1 tablet by mouth Daily. 90 tablet 1 Past Week at Unknown time   • ramipril (ALTACE) 5 MG capsule Take 1 capsule by mouth Daily. 90 capsule 1 Past Week at Unknown time   • rosuvastatin (CRESTOR) 10 MG tablet Take 1 tablet by mouth Daily. 90 tablet 1 Past Week at Unknown time       Allergies:  Allergies   Allergen Reactions   • Lipitor [Atorvastatin] Myalgia   • Penicillins Hives and Swelling       Past Social History:  Social History     Socioeconomic History   • Marital status:      Spouse name: Not on file   • Number of children: Not on file   • Years of education: Not on file   • Highest education level: Not on file   Tobacco Use   • Smoking status: Former Smoker   • Smokeless tobacco: Never Used   • Tobacco comment: CAFFEINE  "USE   Substance and Sexual Activity   • Alcohol use: Yes     Comment: SOCIALLY   • Drug use: No   • Sexual activity: Yes     Partners: Female        Past Family History:  Family History   Problem Relation Age of Onset   • Heart disease Mother    • Heart attack Mother    • Stroke Mother    • Heart disease Father    • Heart attack Father    • Stroke Father    • Arthritis Other    • Diabetes Other    • Hypertension Other    • Kidney disease Other         stones   • Hypertension Sister    • Heart attack Brother    • Heart disease Brother    • No Known Problems Maternal Grandmother    • No Known Problems Maternal Grandfather    • No Known Problems Paternal Grandmother    • No Known Problems Paternal Grandfather    • No Known Problems Brother    • Heart disease Brother    • Heart attack Brother    • Diabetes Brother    • Pancreatic cancer Paternal Cousin          Review of Systems:  All systems reviewed. Pertinent positives identified in HPI. All other systems are negative.         Objective:     Objective:  Temp:  [97 °F (36.1 °C)-97.7 °F (36.5 °C)] 97.7 °F (36.5 °C)  Heart Rate:  [52-69] 55  Resp:  [16] 16  BP: ()/(45-76) 118/57    Intake/Output Summary (Last 24 hours) at 9/8/2019 1228  Last data filed at 9/8/2019 1003  Gross per 24 hour   Intake 1228 ml   Output 825 ml   Net 403 ml     Body mass index is 25.83 kg/m².      09/06/19  0910 09/06/19  1538   Weight: 81.6 kg (180 lb) 77.1 kg (169 lb 14.4 oz)           Physical Exam:   Temp:  [97 °F (36.1 °C)-97.7 °F (36.5 °C)] 97.7 °F (36.5 °C)  Heart Rate:  [52-69] 55  Resp:  [16] 16  BP: ()/(45-76) 118/57    Intake/Output Summary (Last 24 hours) at 9/8/2019 1228  Last data filed at 9/8/2019 1003  Gross per 24 hour   Intake 1228 ml   Output 825 ml   Net 403 ml     Flowsheet Rows      First Filed Value   Admission Height  172.7 cm (68\") Documented at 09/06/2019 0904   Admission Weight  81.6 kg (180 lb) Documented at 09/06/2019 0910          General Appearance:    " Alert, cooperative, in no acute distress   Head:    Normocephalic, without obvious abnormality, atraumatic       Neck:   No adenopathy, supple, no thyromegaly, no carotid bruit, no    JVD   Lungs:     Clear to auscultation bilaterally, no wheezes, rales, or     rhonchi    Heart:    Normal rate, regular rhythm,  No murmur, rub, or gallop   Chest Wall:   Sternotomy   Abdomen:     Normal bowel sounds, soft.  Appropriately tender.   Extremities:   No cyanosis, clubbing, or edema   Pulses:   Pulses palpable and equal bilaterally   Skin:   No bleeding or rash       Neurologic:   Cranial nerves 2 - 12 grossly intact, sensation intact           Lab Review:     Results from last 7 days   Lab Units 09/07/19  0649 09/06/19  0932   SODIUM mmol/L 133* 138   POTASSIUM mmol/L 4.8 4.5   CHLORIDE mmol/L 98 99   CO2 mmol/L 26.4 25.4   BUN mg/dL 15 14   CREATININE mg/dL 0.93 1.00   GLUCOSE mg/dL 119* 99   CALCIUM mg/dL 9.0 9.4       Results from last 7 days   Lab Units 09/06/19  1133 09/06/19  0932   TROPONIN T ng/mL <0.010 <0.010     Results from last 7 days   Lab Units 09/07/19  0649   WBC 10*3/mm3 7.43   HEMOGLOBIN g/dL 16.1   HEMATOCRIT % 51.3*   PLATELETS 10*3/mm3 343                                   Imaging:    Imaging Results (most recent)     Procedure Component Value Units Date/Time    FL Cholangiogram Operative [[id number]] Collected:  09/07/19 0920     Updated:  09/07/19 1035    Narrative:       INTRAOPERATIVE CHOLANGIOGRAM     HISTORY: Gallstones.     FINDINGS: Contrast fills the biliary system with emptying into the  duodenum and there is no evidence of retained gallstone or stricture; 58  images were obtained and the fluoroscopy time measures 11 seconds.     This report was finalized on 9/7/2019 10:31 AM by Dr. Vik Hawkins M.D.        Abdomen Complete [[id number]] Collected:  09/06/19 2118     Updated:  09/07/19 0436    Narrative:       COMPLETE ABDOMEN ULTRASOUND     CLINICAL HISTORY: Cholecystitis     COMPARISON:  None.     FINDINGS: Longitudinal and transverse images of the abdomen were  obtained.Liver is homogeneous and normal in echotexture. No mass or  biliary dilation. Common duct is normal at 6 mm. Gallbladder shows  nonspecific wall thickening at 4.3 mm. No shadowing gallstones or  pericholecystic fluid. Right kidney measures 12.2 cm and the left kidney  11.7 cm. There is no mass or hydronephrosis. Pancreas was largely  obscured. Spleen measured 12.8 cm in length. Aorta nonaneurysmal at 2.0  cm. IVC not studied. No ascites.          Impression:       Nonspecific gallbladder wall thickening without gallstones.     This report was finalized on 9/7/2019 4:32 AM by Alexander Perea M.D.       CT Abdomen Pelvis With Contrast [[id number]] Collected:  09/06/19 1232     Updated:  09/06/19 1232    Narrative:       CT ABDOMEN AND PELVIS WITH IV CONTRAST     HISTORY: 73-year-old male with abdominal pain.     TECHNIQUE: Radiation dose reduction techniques were utilized, including  automated exposure control and exposure modulation based on body size.   3 mm images were obtained through the abdomen and pelvis after the  administration of IV contrast. Compared with previous CT from  02/13/2019.     FINDINGS: The gallbladder is distended and has a thickened wall. There  is a tiny amount of pericholecystic fluid and there is slight haziness  which extends along the common bile duct. There is no intrahepatic or  extrahepatic biliary dilatation. There is no acute abnormality involving  the pancreas, spleen, adrenals, or right kidney. There is a 7 mm  nonobstructing stone at the upper pole of the left kidney. No acute  bowel abnormality is seen. There is moderately extensive sigmoid  diverticulosis without evidence for diverticulitis. The appendix appears  within normal limits. There are extensive abdominal aortic  atherosclerotic changes with luminal narrowing at the bifurcation of the  common iliac arteries and there is likely  hemodynamically significant  stenosis at the proximal right common iliac artery. There is a tiny  umbilical hernia containing fat.       Impression:       1. There is likely acute cholecystitis. There is no biliary dilatation.  2. There is moderately extensive sigmoid diverticulosis without evidence  for diverticulitis. There is no evidence for appendicitis.  3. Single 7 mm nonobstructing stone at the upper pole of the left  kidney.     Discussed with Dr. Brown.       XR Chest 2 View [[id number]] Collected:  09/06/19 1054     Updated:  09/06/19 1112    Narrative:       PA AND LATERAL RADIOGRAPHIC VIEWS OF THE CHEST     CLINICAL HISTORY: Chest pain triage protocol.     PA and lateral radiographic views of the chest demonstrate clear lungs.  There is old granulomatous disease with calcified right paratracheal  lymph nodes. Cardiomediastinal silhouette is remarkable for evidence of  a prior median sternotomy. Degenerative phenomena are noted within the  thoracic spine.       Impression:       No active disease in the chest.     This report was finalized on 9/6/2019 11:09 AM by Dr. Guero Hazel M.D.             Results for orders placed during the hospital encounter of 10/24/17   Adult Transthoracic Echo Complete W/ Cont if Necessary Per Protocol    Narrative · Left ventricular systolic function is normal. Calculated EF = 55.8%.   Estimated EF was in agreement with the calculated EF. Normal left   ventricular cavity size and wall thickness noted. All left ventricular   wall segments contract normally.  · Left ventricular diastolic dysfunction is noted (grade II w/high LAP)   consistent with pseudonormalization.  · The aortic valve is abnormal in structure. The valve exhibits sclerosis.  · Trace to mild tricuspid valve regurgitation is present. Estimated right   ventricular systolic pressure from tricuspid regurgitation is normal (<35   mmHg).          EKG:         BASELINE:       I personally viewed and interpreted  the patient's EKG/Telemetry data.    Assessment:   Assessment/Plan   1.  Epigastric pain: Resolved  2.  Acute cholecystitis: Status post cholecystectomy  3.  Coronary disease with prior CABG  4.  Hyperlipidemia  5.  Gastroesophageal reflux disease    -Cardiac biomarkers and EKG are unchanged.  No signs of ischemia.  -He denies any recent symptoms consistent with angina.  -Per his report his symptoms have resolved after cholecystectomy.  I do not think he needs an ischemic work-up.  He is appropriate for discharge.    Thank you for allowing me to participate in the care of Madhu Santoro. Feel free to contact me directly with any further questions or concerns.    Jordon Eid MD  [facility]  09/08/19  12:28 PM

## 2019-09-07 NOTE — PROGRESS NOTES
Name: Madhu Santoro ADMIT: 2019   : 1945  PCP: Damian Chen MD    MRN: [id number] LOS: 1 days   AGE/SEX: 73 y.o. male  ROOM: [facility]     Subjective   Subjective   CC: abdominal discomfort  No acute events.  Patient went to OR today and tolerated well.  Pain is controlled.  Taking PO.  No CP/dyspnea/f/c/n/v/d.    Objective   Objective   Vital Signs  Temp:  [97 °F (36.1 °C)-98 °F (36.7 °C)] 97 °F (36.1 °C)  Heart Rate:  [60-78] 69  Resp:  [12-16] 16  BP: (109-171)/() 147/76  SpO2:  [90 %-99 %] 95 %  on  Flow (L/min):  [2-4] 2;   Device (Oxygen Therapy): nasal cannula  Body mass index is 25.83 kg/m².  Physical Exam   Constitutional: He is oriented to person, place, and time. No distress.   HENT:   Head: Normocephalic and atraumatic.   Mouth/Throat: Oropharynx is clear and moist.   Eyes: Conjunctivae and EOM are normal. Pupils are equal, round, and reactive to light.   Neck: Normal range of motion. Neck supple.   Cardiovascular: Normal rate, regular rhythm and intact distal pulses.   Pulmonary/Chest: Effort normal and breath sounds normal. He has no rales.   Abdominal: Soft. Bowel sounds are normal. There is tenderness (appropriate postoperative).   Musculoskeletal: He exhibits no edema or tenderness.   Neurological: He is alert and oriented to person, place, and time.   Skin: Skin is warm and dry. He is not diaphoretic.   Psychiatric: He has a normal mood and affect. His behavior is normal.   Nursing note and vitals reviewed.      Results Review:       I reviewed the patient's new clinical results.  Results from last 7 days   Lab Units 19  0649 19  0932   WBC 10*3/mm3 7.43 10.08   HEMOGLOBIN g/dL 16.1 16.4   PLATELETS 10*3/mm3 343 345     Results from last 7 days   Lab Units 19  0649 19  0932   SODIUM mmol/L 133* 138   POTASSIUM mmol/L 4.8 4.5   CHLORIDE mmol/L 98 99   CO2 mmol/L 26.4 25.4   BUN mg/dL 15 14   CREATININE mg/dL 0.93 1.00   GLUCOSE mg/dL 119* 99    Estimated Creatinine Clearance: 76 mL/min (by C-G formula based on SCr of 0.93 mg/dL).  Results from last 7 days   Lab Units 09/07/19  0649 09/06/19  0932   ALBUMIN g/dL 4.00 4.20   BILIRUBIN mg/dL 0.8 1.7*   ALK PHOS U/L 106 99   AST (SGOT) U/L 46* 35   ALT (SGPT) U/L 30 23     Results from last 7 days   Lab Units 09/07/19  0649 09/06/19  0932   CALCIUM mg/dL 9.0 9.4   ALBUMIN g/dL 4.00 4.20       No results found for: HGBA1C, POCGLU      pantoprazole 40 mg Oral Daily   ramipril 5 mg Oral Daily   rosuvastatin 10 mg Oral Daily   sodium chloride 10 mL Intravenous Q12H       dextrose 5 % and sodium chloride 0.45 % with KCl 20 mEq/L 50 mL/hr Last Rate: 50 mL/hr (09/07/19 1059)   Diet Regular       Assessment/Plan     Active Hospital Problems    Diagnosis  POA   • **Acute cholecystitis [K81.0]  Yes   • Right upper quadrant abdominal pain [R10.11]  Yes   • S/P CABG (coronary artery bypass graft) [Z95.1]  Not Applicable   • Gastroesophageal reflux disease [K21.9]  Yes   • HLD (hyperlipidemia) [E78.5]  Yes   • Benign essential hypertension [I10]  Yes   • Anxiety [F41.9]  Yes   • DDD (degenerative disc disease), lumbosacral [M51.37]  Yes   • Arthritis [M19.90]  Yes      Resolved Hospital Problems   No resolved problems to display.   Acute Cholecystitis  - s/p laparoscopic cholecystectomy 9/7/19 with negative intraoperative cholangiogram  - he has received levofloxacin today, no need for further abx  - pain control, diet per surgery, appreciate recs     HTN  - continue ramipril      VTE Prophylaxis - SCDs  Code Status - Full code  Disposition - Anticipate discharge home tomorrow if okay with general surgery.      Alexander Nicole MD  [facility]  09/07/19  7:43 PM

## 2019-09-07 NOTE — ANESTHESIA PROCEDURE NOTES
Airway  Urgency: elective    Date/Time: 9/7/2019 8:06 AM  Airway not difficult    General Information and Staff    Patient location during procedure: OR  Anesthesiologist: Panchito Campbell MD  CRNA: Xena Martino CRNA    Indications and Patient Condition  Indications for airway management: airway protection    Preoxygenated: yes  Mask difficulty assessment: 2 - vent by mask + OA or adjuvant +/- NMBA    Final Airway Details  Final airway type: endotracheal airway      Successful airway: ETT  Cuffed: yes   Successful intubation technique: direct laryngoscopy  Endotracheal tube insertion site: oral  Blade: Mj  Blade size: 4  ETT size (mm): 7.5  Cormack-Lehane Classification: grade I - full view of glottis  Placement verified by: chest auscultation and capnometry   Cuff volume (mL): 6  Measured from: gums  ETT/EBT to gums (cm): 22  Number of attempts at approach: 1    Additional Comments  Atraumatic.

## 2019-09-07 NOTE — PLAN OF CARE
Problem: Patient Care Overview  Goal: Plan of Care Review  Outcome: Ongoing (interventions implemented as appropriate)   09/07/19 2833   Coping/Psychosocial   Plan of Care Reviewed With patient   Plan of Care Review   Progress no change   OTHER   Outcome Summary Pt has been w/o c/o. Pt appears to have slept comfortably through NOC. Pt NPO since MN for scheduled surgery today.  [facility] cardiology called this am to inquire about consult for pre-op clearance, Dr Travis notified and advises pt is ok for surgery prior to cardiology consult.   Continue to monitor and tx per POC and MD orders.        Problem: Pain, Acute (Adult)  Goal: Identify Related Risk Factors and Signs and Symptoms  Outcome: Outcome(s) achieved Date Met: 09/07/19    Goal: Acceptable Pain Control/Comfort Level  Outcome: Ongoing (interventions implemented as appropriate)

## 2019-09-07 NOTE — OP NOTE
Operative Note :  Gauri Travis MD    Madhu Santoro  1945    Procedure Date: 09/07/19    Pre-op Diagnosis:  Acute cholecystitis [K81.0]    Post-op Diagnosis:  Acute cholecystitis [K81.0]    Procedure:   Laparoscopic cholecystectomy with intraoperative cholangiogram    Surgeon: Gauri Travis MD    Assistant: Familia Pruitt CFA    Anesthesia:  General (general endotracheal tube)    Estimated Blood Loss: minimal    Specimens:  Gallbladder    Complications: None    Indications:  · Mr. Santoro is a 73-year-old gentleman with acute cholecystitis who was admitted to the emergency room yesterday afternoon.  I recommended we proceed to the operating room early this morning for a laparoscopic cholecystectomy with intraoperative cholangiogram.  He has been counseled on the risks of the procedure, and has consented to proceed.    Findings:   · Normal cholangiogram    Description of procedure:  The patient was brought to the operating room and placed on the OR table in supine position.  An endotracheal tube was inserted, and general anesthesia was induced.  The anterior abdominal wall was shaved, prepped, and draped in a sterile fashion.  A surgical timeout was then completed.  A curvilinear infraumbilical skin incision was made after instillation of 0.5% Marcaine with epinephrine.  The umbilical stalk was grasped and elevated, and a longitudinal fasciotomy made.  A Dawkins trocar was inserted and secured with 2 separate 0 Vicryl stay sutures.  The abdomen was then insufflated.  Under direct visualization, 3 additional 5 mm trocars were then placed within the subxiphoid and subcostal space on the right.  The gallbladder was retracted cephalad and infundibulum retracted inferiorly.  The cystic duct and cystic artery were slowly dissected out circumferentially until a firm critical view was obtained.  A single Hemoclip was placed across the cystic duct at its junction with the gallbladder infundibulum and 3  hemoclips were placed across the cystic artery.  A small hole was created on the anterior wall of the cystic duct, and a cholangiogram catheter inserted through a right upper quadrant angiocatheter.  The catheter was secured within the duct with an additional Hemoclip and a cholangiogram was then obtained.  The cholangiogram showed normal filling of the cystic duct and common bile duct, retrograde filling of the intrahepatic ducts, and easy spillage into the duodenum with no filling defects appreciable.  The catheter was then withdrawn and 2 additional hemoclips placed across the cystic duct.  The cystic duct and artery were then transected, leaving 2 clips behind on both of the stumps.  The gallbladder was then slowly dissected off of the undersurface of the liver using electrocautery and it was removed from the peritoneal cavity within an Endo Catch bag at the umbilical trocar site.  The gallbladder was passed off to pathology.  The abdomen was then reinsufflated and right upper quadrant inspected. Warm normal saline was irrigated and suctioned dry.  The gallbladder fossa appeared hemostatic.  All trocars were then removed under direct visualization and the abdomen desufflated.  The umbilical fascial incision was closed using the previously placed 0 Vicryl stay sutures, all skin incisions closed with 4-0 Vicryl subcuticular stitches, and then each was dressed with Dermabond.  The patient was then extubated and transferred to PACU in stable condition.  All counts were correct per nursing.    Gauri Travis MD  General and Endoscopic Surgery  [facility]    [address], [address]  [place], [place], [address]  P: [phone]  F: [phone]

## 2019-09-07 NOTE — ANESTHESIA POSTPROCEDURE EVALUATION
Patient: Madhu Santoro    Procedure Summary     Date:  09/07/19 Room / Location:  [facility] OR [address] [place] [address] MAIN OR    Anesthesia Start:  0755 Anesthesia Stop:  0912    Procedure:  Laparoscopic cholecystectomy with intraoperative cholangiogram (N/A Abdomen) Diagnosis:       Acute cholecystitis      (Acute cholecystitis [K81.0])    Surgeon:  Gauri Travis MD Provider:  Panchito Campbell MD    Anesthesia Type:  general ASA Status:  3          Anesthesia Type: general  Last vitals  BP   168/76 (09/07/19 1000)   Temp   36.7 °C (98 °F) (09/07/19 1000)   Pulse   67 (09/07/19 1000)   Resp   16 (09/07/19 1000)     SpO2   96 % (09/07/19 1000)     Post Anesthesia Care and Evaluation    Patient location during evaluation: PACU  Anesthetic complications: No anesthetic complications

## 2019-09-08 VITALS
HEART RATE: 55 BPM | BODY MASS INDEX: 25.75 KG/M2 | WEIGHT: 169.9 LBS | OXYGEN SATURATION: 94 % | RESPIRATION RATE: 16 BRPM | HEIGHT: 68 IN | DIASTOLIC BLOOD PRESSURE: 57 MMHG | SYSTOLIC BLOOD PRESSURE: 118 MMHG | TEMPERATURE: 97.7 F

## 2019-09-08 PROCEDURE — 99024 POSTOP FOLLOW-UP VISIT: CPT | Performed by: SURGERY

## 2019-09-08 RX ADMIN — POTASSIUM CHLORIDE, DEXTROSE MONOHYDRATE AND SODIUM CHLORIDE 50 ML/HR: 150; 5; 450 INJECTION, SOLUTION INTRAVENOUS at 06:21

## 2019-09-08 RX ADMIN — PANTOPRAZOLE SODIUM 40 MG: 40 TABLET, DELAYED RELEASE ORAL at 08:32

## 2019-09-08 RX ADMIN — SODIUM CHLORIDE, PRESERVATIVE FREE 10 ML: 5 INJECTION INTRAVENOUS at 08:32

## 2019-09-08 NOTE — PLAN OF CARE
Problem: Patient Care Overview  Goal: Plan of Care Review  Outcome: Ongoing (interventions implemented as appropriate)   09/08/19 1308   Coping/Psychosocial   Plan of Care Reviewed With patient   Plan of Care Review   Progress improving   OTHER   Outcome Summary Pt has been w/o c/o, no PRN meds admin. Pt appears to have slept/rested welll through NOC. IVF continuous. Lap sites CDI. Pt up ad donnell. Continue to monitor and tx per POC and MD orders.        Problem: Pain, Acute (Adult)  Goal: Acceptable Pain Control/Comfort Level  Outcome: Ongoing (interventions implemented as appropriate)

## 2019-09-08 NOTE — PROGRESS NOTES
"General Surgery  Progress Note    CC: Follow-up acute cholecystitis    POD#1 laparoscopic cholecystectomy with cholangiogram        S: He had very mild pain immediately postop yesterday but has been pain free since then and not required any further pain medication.  He denies any nausea or vomiting and is tolerating a regular diet.    O:/57 (BP Location: Right arm, Patient Position: Lying)   Pulse 55   Temp 97.7 °F (36.5 °C) (Oral)   Resp 16   Ht 172.7 cm (68\")   Wt 77.1 kg (169 lb 14.4 oz)   SpO2 94%   BMI 25.83 kg/m²       Intake & Output (last day)       09/07 0701 - 09/08 0700 09/08 0701 - 09/09 0700    P.O. 0     I.V. (mL/kg) 2043 (26.5) 185 (2.4)    Total Intake(mL/kg) 2043 (26.5) 185 (2.4)    Urine (mL/kg/hr) 825 (0.4)     Stool 0     Total Output 825     Net +1218 +185          Urine Unmeasured Occurrence 4 x     Stool Unmeasured Occurrence 0 x             GENERAL: alert, well appearing, and in no distress  HEENT: normocephalic, atraumatic, moist mucous membranes, clear sclerae   CHEST: clear to auscultation, no wheezes, rales or rhonchi, symmetric air entry  CARDIAC: regular rate and rhythm    ABDOMEN: Soft, nondistended, appropriate mild tenderness, incisions clean and dry with Dermabond  EXTREMITIES: no cyanosis, clubbing, or edema   SKIN: Warm and moist, no rashes    LABS  Results from last 7 days   Lab Units 09/07/19  0649 09/06/19  0932   WBC 10*3/mm3 7.43 10.08   HEMOGLOBIN g/dL 16.1 16.4   HEMATOCRIT % 51.3* 50.8   PLATELETS 10*3/mm3 343 345     Results from last 7 days   Lab Units 09/07/19  0649 09/06/19  0932   SODIUM mmol/L 133* 138   POTASSIUM mmol/L 4.8 4.5   CHLORIDE mmol/L 98 99   CO2 mmol/L 26.4 25.4   BUN mg/dL 15 14   CREATININE mg/dL 0.93 1.00   CALCIUM mg/dL 9.0 9.4   BILIRUBIN mg/dL 0.8 1.7*   ALK PHOS U/L 106 99   ALT (SGPT) U/L 30 23   AST (SGOT) U/L 46* 35   GLUCOSE mg/dL 119* 99             A/P: 73 y.o. male POD#1 laparoscopic cholecystectomy with cholangiogram.     He " can be discharged home and knows to call my office to follow-up with me in 2 weeks.  He has no diet or activity restrictions and can resume showering today.  He requested no pain medication at discharge and I instructed him to use over-the-counter ibuprofen or Tylenol as needed for pain.    Gauri Travis MD  General and Endoscopic Surgery  [facility]    [address], [address]  [place], [place], [address]  P: [phone]  F: [phone]

## 2019-09-08 NOTE — DISCHARGE SUMMARY
"Date of Admission: 9/6/2019  Date of Discharge:  9/8/2019  Primary Care Physician: Damian Chen MD     Discharge Diagnosis:  Active Hospital Problems    Diagnosis  POA   • **Acute cholecystitis [K81.0]  Yes   • Right upper quadrant abdominal pain [R10.11]  Yes   • S/P CABG (coronary artery bypass graft) [Z95.1]  Not Applicable   • Gastroesophageal reflux disease [K21.9]  Yes   • HLD (hyperlipidemia) [E78.5]  Yes   • Benign essential hypertension [I10]  Yes   • Anxiety [F41.9]  Yes   • DDD (degenerative disc disease), lumbosacral [M51.37]  Yes   • Arthritis [M19.90]  Yes      Resolved Hospital Problems   No resolved problems to display.       Presenting Problem/History of Present Illness:  Acute cholecystitis [K81.0]     Hospital Course:  The patient is a 73 y.o. male with a history of CAD s/p CABG on plavix who presented with epigastric and right upper quadrant pain. Please see admission H&P from 9/6/19 for further details.  He had a negative ECG and cardiac enzymes in the ER as well as a CT angiogram of the chest, abdomen, and pelvis which showed no dissection.  This imaging did, however, show evidence of acute cholecystitis.  Dr. Travis with general surgery was consulted and she took him to the OR for a laparoscopic cholecystectomy on 9/6/19.  He had a negative intraoperative cholangiogram at the time.  He did receive levofloxacin through the day of surgery but he has no evidence of systemic infection and thus these were not needed after the surgery.  He was tolerating a regular diet and pain was well-controlled at the time of discharge.  His presenting symptoms completely resolved and did not recur.  He will follow up with Dr. Travis in 2 weeks.    Exam Today:  Blood pressure 118/57, pulse 55, temperature 97.7 °F (36.5 °C), temperature source Oral, resp. rate 16, height 172.7 cm (68\"), weight 77.1 kg (169 lb 14.4 oz), SpO2 94 %.  Constitutional: He is oriented to person, place, and time. No distress. "   Head: Normocephalic and atraumatic.   Mouth/Throat: Oropharynx is clear and moist.   Eyes: Conjunctivae and EOM are normal. Pupils are equal, round, and reactive to light.   Neck: Normal range of motion. Neck supple.   Cardiovascular: Normal rate, regular rhythm and intact distal pulses.   Pulmonary/Chest: Effort normal and breath sounds normal. He has no rales.   Abdominal: Soft. Bowel sounds are normal. There is appropriate postoperative tenderness.  Musculoskeletal: He exhibits no edema or tenderness.   Neurological: He is alert and oriented to person, place, and time.   Skin: Skin is warm and dry. He is not diaphoretic.   Psychiatric: He has a normal mood and affect. His behavior is normal.   Nursing note and vitals reviewed.    Procedures Performed:  Procedure(s):  Laparoscopic cholecystectomy with intraoperative cholangiogram      Consults:   Consults     Date and Time Order Name Status Description    9/6/2019 1559 Inpatient Cardiology Consult      9/6/2019 1559 Inpatient General Surgery Consult Completed     9/6/2019 1259 [facility] (on-call MD unless specified) Details Completed     9/6/2019 1238 Surgery (on-call MD unless specified) Completed            Discharge Disposition:  Home or Self Care    Discharge Medications:     Discharge Medications      Continue These Medications      Instructions Start Date   albuterol sulfate  (90 Base) MCG/ACT inhaler  Commonly known as:  PROVENTIL HFA;VENTOLIN HFA;PROAIR HFA   2 puffs, Inhalation, Every 4 Hours PRN      clopidogrel 75 MG tablet  Commonly known as:  PLAVIX   75 mg, Oral, Daily      HYDROcodone-Acetaminophen 5-300 MG per tablet  Commonly known as:  VICODIN   1 tablet, Oral, 2 Times Daily      nitroglycerin 0.4 MG SL tablet  Commonly known as:  NITROSTAT   0.4 mg, Sublingual, Every 5 Minutes PRN, Take no more than 3 doses in 15 minutes.       pantoprazole 40 MG EC tablet  Commonly known as:  PROTONIX   40 mg, Oral, Daily      ramipril 5 MG capsule  Commonly  known as:  ALTACE   5 mg, Oral, Daily      rosuvastatin 10 MG tablet  Commonly known as:  CRESTOR   10 mg, Oral, Daily             Discharge Diet:   Diet Instructions     Diet: Regular; Thin      Discharge Diet:  Regular    Fluid Consistency:  Thin          Activity at Discharge: Per general surgery instructions.    Follow-up Appointments:  Future Appointments   Date Time Provider Department Center   9/16/2019  9:15 AM Damian Chen MD [id number] None   11/4/2019 10:40 AM Eric Wright MD [id number] None     Additional Instructions for the Follow-ups that You Need to Schedule     Discharge Follow-up with PCP   As directed       Currently Documented PCP:    Damian Chen MD    PCP Phone Number:    [phone]     Follow Up Details:  1-2 weeks               Test Results Pending at Discharge:   Order Current Status    Tissue Pathology Exam In process           Alexander Nicole MD  09/08/19  12:07 PM    Time Spent on Discharge Activities: Greater than 30 minutes.

## 2019-09-09 ENCOUNTER — TRANSITIONAL CARE MANAGEMENT TELEPHONE ENCOUNTER (OUTPATIENT)
Dept: FAMILY MEDICINE CLINIC | Facility: CLINIC | Age: 74
End: 2019-09-09

## 2019-09-09 ENCOUNTER — READMISSION MANAGEMENT (OUTPATIENT)
Dept: CALL CENTER | Facility: HOSPITAL | Age: 74
End: 2019-09-09

## 2019-09-09 NOTE — OUTREACH NOTE
Prep Survey      Responses   Facility patient discharged from?  [place]   Is patient eligible?  Yes   Discharge diagnosis  Acute cholecystitis ,  Laparascopic Cholecystectomy   Does the patient have one of the following disease processes/diagnoses(primary or secondary)?  General Surgery   Does the patient have Home health ordered?  No   Is there a DME ordered?  No   Prep survey completed?  Yes          Karen García RN

## 2019-09-10 ENCOUNTER — READMISSION MANAGEMENT (OUTPATIENT)
Dept: CALL CENTER | Facility: HOSPITAL | Age: 74
End: 2019-09-10

## 2019-09-10 NOTE — PROGRESS NOTES
Case Management Discharge Note    Final Note: Discharged to home on 9/8/19. NIKI kaur Rn, CCP.     Destination      No service has been selected for the patient.      Durable Medical Equipment      No service has been selected for the patient.      Dialysis/Infusion      No service has been selected for the patient.      Home Medical Care      No service has been selected for the patient.      Therapy      No service has been selected for the patient.      Community Resources      No service has been selected for the patient.             Final Discharge Disposition Code: 01 - home or self-care

## 2019-09-10 NOTE — OUTREACH NOTE
Spoke with pt, other than surgery site still very sore, he is feeling ok, able to eat and drink well. No fever, chills, nausea. Surgery site looks good. No unusual redness, swelling, drainage, warmth. Pt will be calling surgeon for fwp appt.

## 2019-09-11 ENCOUNTER — READMISSION MANAGEMENT (OUTPATIENT)
Dept: CALL CENTER | Facility: HOSPITAL | Age: 74
End: 2019-09-11

## 2019-09-11 NOTE — OUTREACH NOTE
General Surgery Week 1 Survey      Responses   Facility patient discharged from?  [place]   Does the patient have one of the following disease processes/diagnoses(primary or secondary)?  General Surgery   Is there a successful TCM telephone encounter documented?  Yes          Patrica Reese RN

## 2019-09-16 ENCOUNTER — OFFICE VISIT (OUTPATIENT)
Dept: FAMILY MEDICINE CLINIC | Facility: CLINIC | Age: 74
End: 2019-09-16

## 2019-09-16 VITALS
BODY MASS INDEX: 25.76 KG/M2 | DIASTOLIC BLOOD PRESSURE: 76 MMHG | SYSTOLIC BLOOD PRESSURE: 141 MMHG | RESPIRATION RATE: 16 BRPM | HEIGHT: 68 IN | WEIGHT: 170 LBS | TEMPERATURE: 98 F | OXYGEN SATURATION: 98 %

## 2019-09-16 DIAGNOSIS — I25.83 CORONARY ARTERY DISEASE DUE TO LIPID RICH PLAQUE: ICD-10-CM

## 2019-09-16 DIAGNOSIS — M51.37 DDD (DEGENERATIVE DISC DISEASE), LUMBOSACRAL: ICD-10-CM

## 2019-09-16 DIAGNOSIS — K81.0 ACUTE CHOLECYSTITIS: ICD-10-CM

## 2019-09-16 DIAGNOSIS — K82.9 GALLBLADDER PAIN: Primary | ICD-10-CM

## 2019-09-16 DIAGNOSIS — I63.9 CEREBROVASCULAR ACCIDENT (CVA), UNSPECIFIED MECHANISM (HCC): ICD-10-CM

## 2019-09-16 DIAGNOSIS — I25.10 CORONARY ARTERY DISEASE DUE TO LIPID RICH PLAQUE: ICD-10-CM

## 2019-09-16 DIAGNOSIS — I10 BENIGN ESSENTIAL HYPERTENSION: ICD-10-CM

## 2019-09-16 DIAGNOSIS — R10.11 RIGHT UPPER QUADRANT ABDOMINAL PAIN: ICD-10-CM

## 2019-09-16 PROCEDURE — 99214 OFFICE O/P EST MOD 30 MIN: CPT | Performed by: FAMILY MEDICINE

## 2019-09-16 RX ORDER — HYDROCODONE BITARTRATE AND ACETAMINOPHEN 5; 300 MG/1; MG/1
1 TABLET ORAL 2 TIMES DAILY
Qty: 60 TABLET | Refills: 0 | Status: SHIPPED | OUTPATIENT
Start: 2019-09-16 | End: 2019-09-20

## 2019-09-16 RX ORDER — RAMIPRIL 5 MG/1
5 CAPSULE ORAL DAILY
Qty: 90 CAPSULE | Refills: 1 | Status: SHIPPED | OUTPATIENT
Start: 2019-09-16 | End: 2020-05-12 | Stop reason: SDUPTHER

## 2019-09-16 RX ORDER — ROSUVASTATIN CALCIUM 10 MG/1
10 TABLET, COATED ORAL DAILY
Qty: 90 TABLET | Refills: 1 | Status: SHIPPED | OUTPATIENT
Start: 2019-09-16 | End: 2020-05-12 | Stop reason: SDUPTHER

## 2019-09-16 RX ORDER — PANTOPRAZOLE SODIUM 40 MG/1
40 TABLET, DELAYED RELEASE ORAL DAILY
Qty: 90 TABLET | Refills: 1 | Status: SHIPPED | OUTPATIENT
Start: 2019-09-16 | End: 2020-05-12 | Stop reason: SDUPTHER

## 2019-09-16 RX ORDER — CLOPIDOGREL BISULFATE 75 MG/1
75 TABLET ORAL DAILY
Qty: 90 TABLET | Refills: 1 | Status: SHIPPED | OUTPATIENT
Start: 2019-09-16 | End: 2020-05-12 | Stop reason: SDUPTHER

## 2019-09-16 NOTE — PROGRESS NOTES
Subjective   Chief Complaint:   Chief Complaint   Patient presents with   • Hypertension   • Hyperlipidemia   • Degenerative Disc disease         History of Present Illness     The patient has read and signed the [facility] Controlled Substance Contract.  I will continue to see patient for regular follow up appointments.  They are well controlled on their medication.  RUTH has been reviewed by me and is updated every 3 months. The patient is aware of the potential for addiction and dependence.  He  comes in today to get his Vicodin 5-300 mg refilled he takes 2/day as needed pain.  He is on this for chronic back pain and arthritis pain.  And has been on this for some time and he does fine on this without any problem so I am going to refill his Vicodin 5-300 mg and he takes 2 a day and usually takes 1 in the morning 1 in the afternoon.  Viewed his records from his recent cholecystectomy.  He went to the ER with abdominal pain and has had some abdominal pain before this and now looking back it probably was attacks of cholecystitis.  He had right upper quadrant pain and was admitted to the ER Dr. Tim Travis removed his gallbladder.  He feels better now that his gallbladder is out.  He will slightly tender in his upper abdomen but that is from the surgery.  Filled his Vicodin 5-300 mg in which he takes 1 p.o. twice daily as needed pain he gets #60 with 2 refills Dr. 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  I told him he needs to check back at the drugstore and make sure he is had the pneumonia and the Prevnar looks like he is probably had the Prevnar 13 but may be not the pneumonia shot but he is going to check with pharmacy          Madhu Santoro 73 y.o. male who presents today for Medical Management of the below listed issues and medication refills.    ICD-10-CM ICD-9-CM   1. Gallbladder pain K82.9 575.9   2. Coronary artery disease due to lipid rich plaque  I25.10 414.00    I25.83 414.3   3. DDD (degenerative disc disease), lumbosacral M51.37 722.52   4. Cerebrovascular accident (CVA), unspecified mechanism (CMS/HCC) I63.9 434.91   5. Benign essential hypertension I10 401.1        he has a problem list of   Patient Active Problem List   Diagnosis   • Anxiety   • Arthritis   • Chronic coronary artery disease   • HLD (hyperlipidemia)   • Benign essential hypertension   • DDD (degenerative disc disease), lumbosacral   • Cerebrovascular accident (CMS/HCC)   • Encounter for screening for cardiovascular disorders   • Gastroesophageal reflux disease   • Hyperlipidemia   • Stenosis of carotid artery   • Former smoker   • History of cardiac catheterization   • S/P ablation of atrial flutter   • Typical atrial flutter (CMS/HCC)   • S/P CABG (coronary artery bypass graft)   • Adenomatous polyp of ascending colon   • Abdominal bloating   • Stroke (CMS/HCC)   • PAD (peripheral artery disease) (CMS/HCC)   • Acute cholecystitis   • Right upper quadrant abdominal pain   .    he has been compliant with   Current Outpatient Medications:   •  clopidogrel (PLAVIX) 75 MG tablet, Take 1 tablet by mouth Daily., Disp: 90 tablet, Rfl: 1  •  HYDROcodone-Acetaminophen (VICODIN) 5-300 MG per tablet, Take 1 tablet by mouth 2 (Two) Times a Day., Disp: 60 tablet, Rfl: 0  •  nitroglycerin (NITROSTAT) 0.4 MG SL tablet, Place 1 tablet under the tongue Every 5 (Five) Minutes As Needed for Chest Pain. Take no more than 3 doses in 15 minutes., Disp: 20 tablet, Rfl: 2  •  pantoprazole (PROTONIX) 40 MG EC tablet, Take 1 tablet by mouth Daily., Disp: 90 tablet, Rfl: 1  •  ramipril (ALTACE) 5 MG capsule, Take 1 capsule by mouth Daily., Disp: 90 capsule, Rfl: 1  •  rosuvastatin (CRESTOR) 10 MG tablet, Take 1 tablet by mouth Daily., Disp: 90 tablet, Rfl: 1  •  albuterol sulfate  (90 Base) MCG/ACT inhaler, Inhale 2 puffs Every 4 (Four) Hours As Needed for Wheezing., Disp: 1 inhaler, Rfl: 0.  he denies  "medication side effects.        /76   Temp 98 °F (36.7 °C)   Resp 16   Ht 172.7 cm (68\")   Wt 77.1 kg (170 lb)   SpO2 98%   BMI 25.85 kg/m²     Results for orders placed or performed during the hospital encounter of 09/06/19   Comprehensive Metabolic Panel   Result Value Ref Range    Glucose 99 65 - 99 mg/dL    BUN 14 8 - 23 mg/dL    Creatinine 1.00 0.76 - 1.27 mg/dL    Sodium 138 136 - 145 mmol/L    Potassium 4.5 3.5 - 5.2 mmol/L    Chloride 99 98 - 107 mmol/L    CO2 25.4 22.0 - 29.0 mmol/L    Calcium 9.4 8.6 - 10.5 mg/dL    Total Protein 7.0 6.0 - 8.5 g/dL    Albumin 4.20 3.50 - 5.20 g/dL    ALT (SGPT) 23 1 - 41 U/L    AST (SGOT) 35 1 - 40 U/L    Alkaline Phosphatase 99 39 - 117 U/L    Total Bilirubin 1.7 (H) 0.2 - 1.2 mg/dL    eGFR Non African Amer 73 >60 mL/min/1.73    Globulin 2.8 gm/dL    A/G Ratio 1.5 g/dL    BUN/Creatinine Ratio 14.0 7.0 - 25.0    Anion Gap 13.6 5.0 - 15.0 mmol/L   Troponin   Result Value Ref Range    Troponin T <0.010 0.000 - 0.030 ng/mL   Troponin   Result Value Ref Range    Troponin T <0.010 0.000 - 0.030 ng/mL   CBC Auto Differential   Result Value Ref Range    WBC 10.08 3.40 - 10.80 10*3/mm3    RBC 5.49 4.14 - 5.80 10*6/mm3    Hemoglobin 16.4 13.0 - 17.7 g/dL    Hematocrit 50.8 37.5 - 51.0 %    MCV 92.5 79.0 - 97.0 fL    MCH 29.9 26.6 - 33.0 pg    MCHC 32.3 31.5 - 35.7 g/dL    RDW 14.6 12.3 - 15.4 %    RDW-SD 50.2 37.0 - 54.0 fl    MPV 9.9 6.0 - 12.0 fL    Platelets 345 140 - 450 10*3/mm3    Neutrophil % 76.3 (H) 42.7 - 76.0 %    Lymphocyte % 9.0 (L) 19.6 - 45.3 %    Monocyte % 13.4 (H) 5.0 - 12.0 %    Eosinophil % 0.0 (L) 0.3 - 6.2 %    Basophil % 0.2 0.0 - 1.5 %    Immature Grans % 1.1 (H) 0.0 - 0.5 %    Neutrophils, Absolute 7.69 (H) 1.70 - 7.00 10*3/mm3    Lymphocytes, Absolute 0.91 0.70 - 3.10 10*3/mm3    Monocytes, Absolute 1.35 (H) 0.10 - 0.90 10*3/mm3    Eosinophils, Absolute 0.00 0.00 - 0.40 10*3/mm3    Basophils, Absolute 0.02 0.00 - 0.20 10*3/mm3    Immature " Grans, Absolute 0.11 (H) 0.00 - 0.05 10*3/mm3    nRBC 0.0 0.0 - 0.2 /100 WBC   Lipase   Result Value Ref Range    Lipase 12 (L) 13 - 60 U/L   Comprehensive Metabolic Panel   Result Value Ref Range    Glucose 119 (H) 65 - 99 mg/dL    BUN 15 8 - 23 mg/dL    Creatinine 0.93 0.76 - 1.27 mg/dL    Sodium 133 (L) 136 - 145 mmol/L    Potassium 4.8 3.5 - 5.2 mmol/L    Chloride 98 98 - 107 mmol/L    CO2 26.4 22.0 - 29.0 mmol/L    Calcium 9.0 8.6 - 10.5 mg/dL    Total Protein 6.9 6.0 - 8.5 g/dL    Albumin 4.00 3.50 - 5.20 g/dL    ALT (SGPT) 30 1 - 41 U/L    AST (SGOT) 46 (H) 1 - 40 U/L    Alkaline Phosphatase 106 39 - 117 U/L    Total Bilirubin 0.8 0.2 - 1.2 mg/dL    eGFR Non African Amer 79 >60 mL/min/1.73    Globulin 2.9 gm/dL    A/G Ratio 1.4 g/dL    BUN/Creatinine Ratio 16.1 7.0 - 25.0    Anion Gap 8.6 5.0 - 15.0 mmol/L   CBC Auto Differential   Result Value Ref Range    WBC 7.43 3.40 - 10.80 10*3/mm3    RBC 5.41 4.14 - 5.80 10*6/mm3    Hemoglobin 16.1 13.0 - 17.7 g/dL    Hematocrit 51.3 (H) 37.5 - 51.0 %    MCV 94.8 79.0 - 97.0 fL    MCH 29.8 26.6 - 33.0 pg    MCHC 31.4 (L) 31.5 - 35.7 g/dL    RDW 14.6 12.3 - 15.4 %    RDW-SD 51.3 37.0 - 54.0 fl    MPV 10.5 6.0 - 12.0 fL    Platelets 343 140 - 450 10*3/mm3    Neutrophil % 66.3 42.7 - 76.0 %    Lymphocyte % 12.4 (L) 19.6 - 45.3 %    Monocyte % 18.2 (H) 5.0 - 12.0 %    Eosinophil % 0.4 0.3 - 6.2 %    Basophil % 0.3 0.0 - 1.5 %    Immature Grans % 2.4 (H) 0.0 - 0.5 %    Neutrophils, Absolute 4.93 1.70 - 7.00 10*3/mm3    Lymphocytes, Absolute 0.92 0.70 - 3.10 10*3/mm3    Monocytes, Absolute 1.35 (H) 0.10 - 0.90 10*3/mm3    Eosinophils, Absolute 0.03 0.00 - 0.40 10*3/mm3    Basophils, Absolute 0.02 0.00 - 0.20 10*3/mm3    Immature Grans, Absolute 0.18 (H) 0.00 - 0.05 10*3/mm3    nRBC 0.0 0.0 - 0.2 /100 WBC   Tissue Pathology Exam   Result Value Ref Range    Case Report       Surgical Pathology Report                         Case: [id number]                                 "  Authorizing Provider:  Gauri Travis MD     Collected:           09/07/2019 08:19 AM          Ordering Location:     [facility]  Received:            09/07/2019 10:37 AM                                 MAIN OR                                                                      Pathologist:           Cindy Richardson MD                                                          Specimen:    Gallbladder                                                                                Final Diagnosis       1. Gallbladder, Cholecystectomy:   A. Acute and chronic cholecystitis.    [facility]/s      Gross Description        Received in formalin labeled with the patient's name and designated \"gallbladder\".  The specimen consists of an intact pink tan mildly distended gallbladder measuring 9.7 x 4.0 x 2.5 cm.  The serosal surface is smooth and glistening.  The hepatic surface is mildly granular.  The cystic duct is patent.  Opening the specimen longitudinally reveals approximately 20 cc of watery greenish-brown bilious liquid.  No obvious calculus fragments are identified within the container nor within the lumen. The gallbladder wall is variably thickened measuring up to 1.2 cm in maximum thickness.  The wall is lined by pale tan velvety mucosa without mass or polyp formation identified.  Representative sections of the cystic duct, body and fundus are submitted in a single cassette.  Total blocks this case: 1    HT/USO/[facility]/jse       Microscopic Description       Performed, incorporated in diagnosis.     Light Blue Top   Result Value Ref Range    Extra Tube hold for add-on    Green Top (Gel)   Result Value Ref Range    Extra Tube Hold for add-ons.    Lavender Top   Result Value Ref Range    Extra Tube hold for add-on    Gold Top - SST   Result Value Ref Range    Extra Tube Hold for add-ons.              The following portions of the patient's history were reviewed and updated as appropriate: allergies, current " medications, past family history, past medical history, past social history, past surgical history and problem list.    Review of Systems   Constitutional: Negative for activity change, appetite change and unexpected weight change.   HENT: Negative for congestion and sinus pressure.    Eyes: Negative for pain and visual disturbance.   Respiratory: Negative for chest tightness and shortness of breath.    Cardiovascular: Negative for chest pain and palpitations.   Gastrointestinal: Positive for abdominal pain. Negative for abdominal distention and vomiting.   Endocrine: Negative for polyuria.   Genitourinary: Negative for difficulty urinating, dysuria, frequency and hematuria.   Musculoskeletal: Negative for arthralgias, back pain and gait problem.   Skin: Negative for color change.   Neurological: Negative for dizziness, syncope and speech difficulty.   Psychiatric/Behavioral: Negative for confusion and decreased concentration.       Objective   Physical Exam   Constitutional: He is oriented to person, place, and time. He appears well-developed and well-nourished.   HENT:   Head: Atraumatic.   Mouth/Throat: Oropharynx is clear and moist.   Eyes: EOM are normal. Pupils are equal, round, and reactive to light.   Neck: Normal range of motion. Neck supple. No thyromegaly present.   Cardiovascular: Normal rate and regular rhythm.   Pulmonary/Chest: Effort normal and breath sounds normal.   Abdominal: Soft. Bowel sounds are normal. He exhibits no distension. There is tenderness. There is no guarding.   Musculoskeletal: Normal range of motion.   Neurological: He is alert and oriented to person, place, and time.   Skin: Skin is warm and dry.   Psychiatric: He has a normal mood and affect. His behavior is normal.   Nursing note and vitals reviewed.      Assessment/Plan   Madhu was seen today for hypertension, hyperlipidemia and degenerative disc disease.    Diagnoses and all orders for this visit:    Gallbladder  pain  Comments:             cholecyctitis     s/p cholecystectomy    Coronary artery disease due to lipid rich plaque  -     ramipril (ALTACE) 5 MG capsule; Take 1 capsule by mouth Daily.    DDD (degenerative disc disease), lumbosacral  -     HYDROcodone-Acetaminophen (VICODIN) 5-300 MG per tablet; Take 1 tablet by mouth 2 (Two) Times a Day.    Cerebrovascular accident (CVA), unspecified mechanism (CMS/HCC)  -     clopidogrel (PLAVIX) 75 MG tablet; Take 1 tablet by mouth Daily.    Benign essential hypertension  -     clopidogrel (PLAVIX) 75 MG tablet; Take 1 tablet by mouth Daily.    Other orders  -     rosuvastatin (CRESTOR) 10 MG tablet; Take 1 tablet by mouth Daily.  -     pantoprazole (PROTONIX) 40 MG EC tablet; Take 1 tablet by mouth Daily.                 -Labs results discussed in detail with the patient, if no recent labs were done, order placed today.  Plan to update vaccines if needed today.  I  reviewed health maintenance with the patient as part of my preventative care plan. I discussed preventative counseling with the patient in detail.

## 2019-09-16 NOTE — PATIENT INSTRUCTIONS
Exercise 30 minutes most days of the week  Sleep 6-8 hours each night if possible  Low fat, low cholesterol diet   we discussed prescribed medications and how to take them   make sure you get results of any labs/studies ordered today  Low glycemic index diet      Patients must be seen every 3 months for their presription medication review and refill required by [place] law.  Patient has been advised on the potential for addiction  and dependence to their prescribed scheduled medication.  RUTH was obtained today and reviewed. This was scanned into the patient's chart.  He probably had the old pneumonia shot and has had the Prevnar 13 he has not had the new shingles shot so he is going to check with pharmacy and get the 2 new shingles shots at the pharmacy  Labs results discussed in detail with the patient, if no recent labs were done, order placed today.  Plan to update vaccines if needed today.  I  reviewed health maintenance with the patient as part of my preventative care plan. I discussed preventative counseling with the patient in detail.

## 2019-09-16 NOTE — PROGRESS NOTES
Subjective   Chief Complaint: No chief complaint on file.        History of Present Illness             Madhu Santoro 73 y.o. male who presents today for Medical Management of the below listed issues and medication refills.  No diagnosis found.     he has a problem list of   Patient Active Problem List   Diagnosis   • Anxiety   • Arthritis   • Chronic coronary artery disease   • HLD (hyperlipidemia)   • Benign essential hypertension   • DDD (degenerative disc disease), lumbosacral   • Cerebrovascular accident (CMS/HCC)   • Encounter for screening for cardiovascular disorders   • Gastroesophageal reflux disease   • Hyperlipidemia   • Stenosis of carotid artery   • Former smoker   • History of cardiac catheterization   • S/P ablation of atrial flutter   • Typical atrial flutter (CMS/HCC)   • S/P CABG (coronary artery bypass graft)   • Adenomatous polyp of ascending colon   • Abdominal bloating   • Stroke (CMS/HCC)   • PAD (peripheral artery disease) (CMS/HCC)   • Acute cholecystitis   • Right upper quadrant abdominal pain   .    he has been compliant with   Current Outpatient Medications:   •  clopidogrel (PLAVIX) 75 MG tablet, Take 1 tablet by mouth Daily., Disp: 90 tablet, Rfl: 1  •  HYDROcodone-Acetaminophen (VICODIN) 5-300 MG per tablet, Take 1 tablet by mouth 2 (Two) Times a Day., Disp: 60 tablet, Rfl: 0  •  nitroglycerin (NITROSTAT) 0.4 MG SL tablet, Place 1 tablet under the tongue Every 5 (Five) Minutes As Needed for Chest Pain. Take no more than 3 doses in 15 minutes., Disp: 20 tablet, Rfl: 2  •  pantoprazole (PROTONIX) 40 MG EC tablet, Take 1 tablet by mouth Daily., Disp: 90 tablet, Rfl: 1  •  ramipril (ALTACE) 5 MG capsule, Take 1 capsule by mouth Daily., Disp: 90 capsule, Rfl: 1  •  rosuvastatin (CRESTOR) 10 MG tablet, Take 1 tablet by mouth Daily., Disp: 90 tablet, Rfl: 1  •  albuterol sulfate  (90 Base) MCG/ACT inhaler, Inhale 2 puffs Every 4 (Four) Hours As Needed for Wheezing., Disp: 1 inhaler,  "Rfl: 0.  he denies medication side effects.        /76   Temp 98 °F (36.7 °C)   Resp 16   Ht 172.7 cm (68\")   Wt 77.1 kg (170 lb)   SpO2 98%   BMI 25.85 kg/m²     Results for orders placed or performed during the hospital encounter of 09/06/19   Comprehensive Metabolic Panel   Result Value Ref Range    Glucose 99 65 - 99 mg/dL    BUN 14 8 - 23 mg/dL    Creatinine 1.00 0.76 - 1.27 mg/dL    Sodium 138 136 - 145 mmol/L    Potassium 4.5 3.5 - 5.2 mmol/L    Chloride 99 98 - 107 mmol/L    CO2 25.4 22.0 - 29.0 mmol/L    Calcium 9.4 8.6 - 10.5 mg/dL    Total Protein 7.0 6.0 - 8.5 g/dL    Albumin 4.20 3.50 - 5.20 g/dL    ALT (SGPT) 23 1 - 41 U/L    AST (SGOT) 35 1 - 40 U/L    Alkaline Phosphatase 99 39 - 117 U/L    Total Bilirubin 1.7 (H) 0.2 - 1.2 mg/dL    eGFR Non African Amer 73 >60 mL/min/1.73    Globulin 2.8 gm/dL    A/G Ratio 1.5 g/dL    BUN/Creatinine Ratio 14.0 7.0 - 25.0    Anion Gap 13.6 5.0 - 15.0 mmol/L   Troponin   Result Value Ref Range    Troponin T <0.010 0.000 - 0.030 ng/mL   Troponin   Result Value Ref Range    Troponin T <0.010 0.000 - 0.030 ng/mL   CBC Auto Differential   Result Value Ref Range    WBC 10.08 3.40 - 10.80 10*3/mm3    RBC 5.49 4.14 - 5.80 10*6/mm3    Hemoglobin 16.4 13.0 - 17.7 g/dL    Hematocrit 50.8 37.5 - 51.0 %    MCV 92.5 79.0 - 97.0 fL    MCH 29.9 26.6 - 33.0 pg    MCHC 32.3 31.5 - 35.7 g/dL    RDW 14.6 12.3 - 15.4 %    RDW-SD 50.2 37.0 - 54.0 fl    MPV 9.9 6.0 - 12.0 fL    Platelets 345 140 - 450 10*3/mm3    Neutrophil % 76.3 (H) 42.7 - 76.0 %    Lymphocyte % 9.0 (L) 19.6 - 45.3 %    Monocyte % 13.4 (H) 5.0 - 12.0 %    Eosinophil % 0.0 (L) 0.3 - 6.2 %    Basophil % 0.2 0.0 - 1.5 %    Immature Grans % 1.1 (H) 0.0 - 0.5 %    Neutrophils, Absolute 7.69 (H) 1.70 - 7.00 10*3/mm3    Lymphocytes, Absolute 0.91 0.70 - 3.10 10*3/mm3    Monocytes, Absolute 1.35 (H) 0.10 - 0.90 10*3/mm3    Eosinophils, Absolute 0.00 0.00 - 0.40 10*3/mm3    Basophils, Absolute 0.02 0.00 - 0.20 " 10*3/mm3    Immature Grans, Absolute 0.11 (H) 0.00 - 0.05 10*3/mm3    nRBC 0.0 0.0 - 0.2 /100 WBC   Lipase   Result Value Ref Range    Lipase 12 (L) 13 - 60 U/L   Comprehensive Metabolic Panel   Result Value Ref Range    Glucose 119 (H) 65 - 99 mg/dL    BUN 15 8 - 23 mg/dL    Creatinine 0.93 0.76 - 1.27 mg/dL    Sodium 133 (L) 136 - 145 mmol/L    Potassium 4.8 3.5 - 5.2 mmol/L    Chloride 98 98 - 107 mmol/L    CO2 26.4 22.0 - 29.0 mmol/L    Calcium 9.0 8.6 - 10.5 mg/dL    Total Protein 6.9 6.0 - 8.5 g/dL    Albumin 4.00 3.50 - 5.20 g/dL    ALT (SGPT) 30 1 - 41 U/L    AST (SGOT) 46 (H) 1 - 40 U/L    Alkaline Phosphatase 106 39 - 117 U/L    Total Bilirubin 0.8 0.2 - 1.2 mg/dL    eGFR Non African Amer 79 >60 mL/min/1.73    Globulin 2.9 gm/dL    A/G Ratio 1.4 g/dL    BUN/Creatinine Ratio 16.1 7.0 - 25.0    Anion Gap 8.6 5.0 - 15.0 mmol/L   CBC Auto Differential   Result Value Ref Range    WBC 7.43 3.40 - 10.80 10*3/mm3    RBC 5.41 4.14 - 5.80 10*6/mm3    Hemoglobin 16.1 13.0 - 17.7 g/dL    Hematocrit 51.3 (H) 37.5 - 51.0 %    MCV 94.8 79.0 - 97.0 fL    MCH 29.8 26.6 - 33.0 pg    MCHC 31.4 (L) 31.5 - 35.7 g/dL    RDW 14.6 12.3 - 15.4 %    RDW-SD 51.3 37.0 - 54.0 fl    MPV 10.5 6.0 - 12.0 fL    Platelets 343 140 - 450 10*3/mm3    Neutrophil % 66.3 42.7 - 76.0 %    Lymphocyte % 12.4 (L) 19.6 - 45.3 %    Monocyte % 18.2 (H) 5.0 - 12.0 %    Eosinophil % 0.4 0.3 - 6.2 %    Basophil % 0.3 0.0 - 1.5 %    Immature Grans % 2.4 (H) 0.0 - 0.5 %    Neutrophils, Absolute 4.93 1.70 - 7.00 10*3/mm3    Lymphocytes, Absolute 0.92 0.70 - 3.10 10*3/mm3    Monocytes, Absolute 1.35 (H) 0.10 - 0.90 10*3/mm3    Eosinophils, Absolute 0.03 0.00 - 0.40 10*3/mm3    Basophils, Absolute 0.02 0.00 - 0.20 10*3/mm3    Immature Grans, Absolute 0.18 (H) 0.00 - 0.05 10*3/mm3    nRBC 0.0 0.0 - 0.2 /100 WBC   Tissue Pathology Exam   Result Value Ref Range    Case Report       Surgical Pathology Report                         Case: [id number]                  "                 Authorizing Provider:  Gauri Travis MD     Collected:           09/07/2019 08:19 AM          Ordering Location:     [facility]  Received:            09/07/2019 10:37 AM                                 MAIN OR                                                                      Pathologist:           Cindy Richardson MD                                                          Specimen:    Gallbladder                                                                                Final Diagnosis       1. Gallbladder, Cholecystectomy:   A. Acute and chronic cholecystitis.    [facility]/s      Gross Description        Received in formalin labeled with the patient's name and designated \"gallbladder\".  The specimen consists of an intact pink tan mildly distended gallbladder measuring 9.7 x 4.0 x 2.5 cm.  The serosal surface is smooth and glistening.  The hepatic surface is mildly granular.  The cystic duct is patent.  Opening the specimen longitudinally reveals approximately 20 cc of watery greenish-brown bilious liquid.  No obvious calculus fragments are identified within the container nor within the lumen. The gallbladder wall is variably thickened measuring up to 1.2 cm in maximum thickness.  The wall is lined by pale tan velvety mucosa without mass or polyp formation identified.  Representative sections of the cystic duct, body and fundus are submitted in a single cassette.  Total blocks this case: 1    HT/USO/[facility]/jse       Microscopic Description       Performed, incorporated in diagnosis.     Light Blue Top   Result Value Ref Range    Extra Tube hold for add-on    Green Top (Gel)   Result Value Ref Range    Extra Tube Hold for add-ons.    Lavender Top   Result Value Ref Range    Extra Tube hold for add-on    Gold Top - SST   Result Value Ref Range    Extra Tube Hold for add-ons.              The following portions of the patient's history were reviewed and updated as appropriate: " allergies, current medications, past family history, past medical history, past social history, past surgical history and problem list.    Review of Systems    Objective   Physical Exam    Assessment/Plan   There are no diagnoses linked to this encounter.             -Labs results discussed in detail with the patient, if no recent labs were done, order placed today.  Plan to update vaccines if needed today.  I  reviewed health maintenance with the patient as part of my preventative care plan. I discussed preventative counseling with the patient in detail.

## 2019-09-17 ENCOUNTER — READMISSION MANAGEMENT (OUTPATIENT)
Dept: CALL CENTER | Facility: HOSPITAL | Age: 74
End: 2019-09-17

## 2019-09-17 NOTE — OUTREACH NOTE
General Surgery Week 2 Survey      Responses   Facility patient discharged from?  [facility]   Does the patient have one of the following disease processes/diagnoses(primary or secondary)?  General Surgery   Week 2 attempt successful?  Yes   Call start time  1453   Call end time  1456   Discharge diagnosis  Acute cholecystitis ,  Laparascopic Cholecystectomy   Person spoke with today (if not patient) and relationship  Brooke-wife   Meds reviewed with patient/caregiver?  N/A   Does the patient have all medications related to this admission filled (includes all antibiotics, pain medications, etc.)  N/A   Is the patient taking all medications as directed (includes completed medication regime)?  Yes   Does the patient have a follow up appointment scheduled with their surgeon?  Yes   Has the patient kept scheduled appointments due by today?  Yes   Has home health visited the patient within 72 hours of discharge?  N/A   Psychosocial issues?  No   Did the patient receive a copy of their discharge instructions?  Yes   Nursing interventions  Reviewed instructions with patient   What is the patient's perception of their health status since discharge?  Improving   Is the patient /caregiver able to teach back basic post-op care?  Practice 'cough and deep breath', Take showers only when approved by MD-sponge bathe until then, Keep incision areas clean,dry and protected, Lifting as instructed by MD in discharge instructions   Is the patient/caregiver able to teach back signs and symptoms of incisional infection?  Increased redness, swelling or pain at the incisonal site, Incisional warmth, Fever, Increased drainage or bleeding, Pus or odor from incision   Is the patient/caregiver able to teach back steps to recovery at home?  Set small, achievable goals for return to baseline health, Eat a well-balance diet, Rest and rebuild strength, gradually increase activity   Is the patient/caregiver able to teach back the hierarchy of who  to call/visit for symptoms/problems? PCP, Specialist, Home health nurse, Urgent Care, ED, 911  Yes   Week 2 call completed?  Yes          Deedee Encinas RN

## 2019-09-20 ENCOUNTER — OFFICE VISIT (OUTPATIENT)
Dept: SURGERY | Facility: CLINIC | Age: 74
End: 2019-09-20

## 2019-09-20 DIAGNOSIS — K81.0 ACUTE CHOLECYSTITIS: ICD-10-CM

## 2019-09-20 DIAGNOSIS — Z09 SURGICAL FOLLOWUP: Primary | ICD-10-CM

## 2019-09-20 PROCEDURE — 99024 POSTOP FOLLOW-UP VISIT: CPT | Performed by: SURGERY

## 2019-09-20 NOTE — PROGRESS NOTES
CHIEF COMPLAINT:   Chief Complaint   Patient presents with   • Post-op Gallbladder       HISTORY OF PRESENT ILLNESS:  This is a 73 y.o. male who presents for a post-operative visit after undergoing a laparoscopic cholecystectomy with cholangiogram.  He denies any nausea, vomiting, or diarrhea since surgery.  He has had no fevers, chills, jaundice, or scleral icterus.    Pathology:   Gallbladder, Cholecystectomy:               A. Acute and chronic cholecystitis.    PHYSICAL EXAM:  Lungs: Clear  Heart: RRR  ABD: Incisions are healing well without any erythema or signs of infection.  Ext: no significant edema, calves nontender    A/P:  This is a 73 y.o. male patient who is S/P lap lizzy with IOC on 9/7/2019    He is healing beautifully.  I discussed the benign pathology findings with him.  He can follow-up with me as needed.    Gauri Travis MD  General and Endoscopic Surgery  [facility]    [address], [address]  [place], [place], [address]  P: [phone]  F: [phone]

## 2019-09-25 ENCOUNTER — READMISSION MANAGEMENT (OUTPATIENT)
Dept: CALL CENTER | Facility: HOSPITAL | Age: 74
End: 2019-09-25

## 2019-10-10 ENCOUNTER — HOSPITAL ENCOUNTER (EMERGENCY)
Facility: HOSPITAL | Age: 74
Discharge: HOME | End: 2019-10-10
Attending: EMERGENCY MEDICINE | Admitting: EMERGENCY MEDICINE

## 2019-10-10 ENCOUNTER — APPOINTMENT (OUTPATIENT)
Dept: GENERAL RADIOLOGY | Facility: HOSPITAL | Age: 74
End: 2019-10-10

## 2019-10-10 VITALS
BODY MASS INDEX: 27.28 KG/M2 | DIASTOLIC BLOOD PRESSURE: 68 MMHG | WEIGHT: 180 LBS | RESPIRATION RATE: 12 BRPM | HEART RATE: 55 BPM | OXYGEN SATURATION: 98 % | TEMPERATURE: 97.6 F | SYSTOLIC BLOOD PRESSURE: 146 MMHG | HEIGHT: 68 IN

## 2019-10-10 DIAGNOSIS — M79.672 LEFT FOOT PAIN: Primary | ICD-10-CM

## 2019-10-10 PROCEDURE — 99283 EMERGENCY DEPT VISIT LOW MDM: CPT

## 2019-10-10 PROCEDURE — 73630 X-RAY EXAM OF FOOT: CPT

## 2019-10-10 NOTE — ED TRIAGE NOTES
"Pt reports left foot pain and swelling since last night. Pt states \"I think something bit me.\"  "

## 2019-10-10 NOTE — ED PROVIDER NOTES
MD ATTESTATION NOTE    The LORNA and I have discussed this patient's history, physical exam, and treatment plan.  I have reviewed the documentation and personally had a face to face interaction with the patient. I affirm the documentation and agree with the treatment and plan.  The attached note describes my personal findings.      History  73-year-old male presents with acute onset of left foot pain and swelling.    Physical Exam  Vital Signs reviewed  Alert, Well Appearing in NAD  Left foot- tenderness to palpation laterally.  There is mild erythema.  There is no warmth.    Disposition  Plan x-ray of the foot to rule out fracture.  If negative would immobilized with a postop shoe and follow-up with orthopedics, Dr. Radha kirkpatrick.     Cirilo Leos MD  10/10/19 3646

## 2019-10-10 NOTE — ED PROVIDER NOTES
" EMERGENCY DEPARTMENT ENCOUNTER    Room Number:  17/17  Date of encounter:  10/10/2019  PCP: Damian Chen MD  Historian: patient      HPI:  Chief Complaint: left foot swelling and pain  A complete HPI/ROS/PMH/PSH/SH/FH are unobtainable due to: none    Context: Madhu Santoro is a 73 y.o. male who presents to the ED c/o left foot swelling and \"throbbing\" pain beginning last night. Pt states pain radiates up to the knee when bearing weight. Pt denies fever, vomiting and fall. Pt reports mowing the lawn last night and was taking a shower when it started hurting \"suddenly\" and pt had difficulty bearing weight.  Pt takes Plavix.     PAST MEDICAL HISTORY  Active Ambulatory Problems     Diagnosis Date Noted   • Anxiety    • Arthritis    • Chronic coronary artery disease    • HLD (hyperlipidemia)    • Benign essential hypertension    • DDD (degenerative disc disease), lumbosacral    • Cerebrovascular accident (CMS/HCC)    • Encounter for screening for cardiovascular disorders 11/20/2012   • Gastroesophageal reflux disease 04/04/2016   • Hyperlipidemia 04/04/2016   • Stenosis of carotid artery 04/26/2017   • Former smoker 04/26/2017   • History of cardiac catheterization 12/16/2011   • S/P ablation of atrial flutter 04/26/2017   • Typical atrial flutter (CMS/HCC) 04/26/2017   • S/P CABG (coronary artery bypass graft) 04/26/2017   • Adenomatous polyp of ascending colon 02/12/2019   • Abdominal bloating 02/12/2019   • Stroke (CMS/HCC) 03/29/2019   • PAD (peripheral artery disease) (CMS/HCC) 03/29/2019   • Acute cholecystitis 09/06/2019   • Right upper quadrant abdominal pain 09/06/2019     Resolved Ambulatory Problems     Diagnosis Date Noted   • No Resolved Ambulatory Problems     Past Medical History:   Diagnosis Date   • Anxiety    • Arthritis    • Atrial flutter (CMS/HCC)    • Mandel esophagus    • Benign essential hypertension    • CAD (coronary artery disease)    • Carotid artery disease (CMS/HCC)    • Colonic polyp "    • DDD (degenerative disc disease), lumbosacral    • GERD (gastroesophageal reflux disease)    • H/O bone density study 2013   • H/O complete eye exam 2014   • HLD (hyperlipidemia)    • Hypertension    • Lipid screening 05/31/2013   • Low back pain    • Screening for prostate cancer 07/07/2015   • Stroke (CMS/HCC)          PAST SURGICAL HISTORY  Past Surgical History:   Procedure Laterality Date   • CARDIAC CATHETERIZATION N/A 4/10/2017    Procedure: Left Heart Cath;  Surgeon: Marjorie Healy MD;  [facility];  Service:    • CARDIAC CATHETERIZATION N/A 4/10/2017    Procedure: Coronary angiography;  Surgeon: Marjorie Healy MD;  [facility];  Service:    • CARDIAC CATHETERIZATION N/A 4/10/2017    Procedure: Left ventriculography;  Surgeon: Marjorie Healy MD;  [facility];  Service:    • CARDIAC ELECTROPHYSIOLOGY PROCEDURE N/A 4/18/2017    Procedure: Ablation atrial flutter;  Surgeon: Jose Antonio Gustafson MD;  Location: [facility] CATH INVASIVE LOCATION;  Service:    • CHOLECYSTECTOMY     • CHOLECYSTECTOMY WITH INTRAOPERATIVE CHOLANGIOGRAM N/A 9/7/2019    Procedure: Laparoscopic cholecystectomy with intraoperative cholangiogram;  Surgeon: Gauri Travis MD;  Location: [facility] MAIN OR;  Service: General   • COLONOSCOPY  01/06/2015    Diverticulosis, one TA   • COLONOSCOPY N/A 2/14/2019    tics, NBIH, adenomatous polyp x 2   • CORONARY ARTERY BYPASS GRAFT N/A 4/11/2017    Procedure: AR STERNOTOMY CORONARY ARTERY BYPASS GRAFT TIMES 3 USING LEFT INTERNAL MAMMARY ARTERY AND LEFT GREATER SAPHENOUS VEIN GRAFT PER ENDOSCOPIC VEIN HARVESTING AND PRP ;  Surgeon: Temo Cortez MD;  Location: [facility] MAIN OR;  Service:    • ENDOSCOPY  01/06/2015    HH, Mandel's esophagus   • ENDOSCOPY N/A 2/14/2019    Z line irregular, HH, Mandel's esophagus   • VASECTOMY           FAMILY HISTORY  Family History   Problem Relation Age of Onset   • Heart  disease Mother    • Heart attack Mother    • Stroke Mother    • Heart disease Father    • Heart attack Father    • Stroke Father    • Arthritis Other    • Diabetes Other    • Hypertension Other    • Kidney disease Other         stones   • Hypertension Sister    • Heart attack Brother    • Heart disease Brother    • No Known Problems Maternal Grandmother    • No Known Problems Maternal Grandfather    • No Known Problems Paternal Grandmother    • No Known Problems Paternal Grandfather    • No Known Problems Brother    • Heart disease Brother    • Heart attack Brother    • Diabetes Brother    • Pancreatic cancer Paternal Cousin          SOCIAL HISTORY  Social History     Socioeconomic History   • Marital status:      Spouse name: Not on file   • Number of children: Not on file   • Years of education: Not on file   • Highest education level: Not on file   Tobacco Use   • Smoking status: Former Smoker   • Smokeless tobacco: Never Used   • Tobacco comment: CAFFEINE USE   Substance and Sexual Activity   • Alcohol use: Yes     Comment: SOCIALLY   • Drug use: No   • Sexual activity: Yes     Partners: Female         ALLERGIES  Atorvastatin and Penicillins        REVIEW OF SYSTEMS  Review of Systems   All systems reviewed and negative except for those discussed in HPI.       PHYSICAL EXAM    I have reviewed the triage vital signs and nursing notes.    ED Triage Vitals [10/10/19 1201]   Temp Heart Rate Resp BP SpO2   97.6 °F (36.4 °C) 82 18 -- 98 %      Temp src Heart Rate Source Patient Position BP Location FiO2 (%)   Tympanic -- -- -- --       GENERAL: not distressed  HENT: nares patent  EYES: no scleral icterus  CV: regular rhythm, regular rate, DP and PT pulses 2+  RESPIRATORY: normal effort  ABDOMEN: soft  MUSCULOSKELETAL: no deformity, focal area of tenderness and swelling at base of left fifth metatarsal  NEURO: alert, moves all extremities, follows commands  SKIN: warm, dry    Physical Exam    LAB RESULTS  No  results found for this or any previous visit (from the past 24 hour(s)).    Ordered the above labs and independently reviewed the results.        RADIOLOGY  Xr Foot 3+ View Left    Result Date: 10/10/2019  XR FOOT 3+ VW LEFT-  INDICATIONS: Pain at the base of the fifth metatarsal, no injury  TECHNIQUE: 3 views of the left foot  COMPARISON: None available  FINDINGS:  No acute fracture or erosion is identified. Posterior calcaneal spurring is seen. No dislocation is noted. The soft tissues appear unremarkable.        No acute fracture or erosion is identified. Follow-up/further evaluation can be obtained as indicated.  This report was finalized on 10/10/2019 2:05 PM by Dr. Mukesh Bruce M.D.        I ordered the above noted radiological studies. Reviewed by me and discussed with radiologist.  See dictation for official radiology interpretation.      PROCEDURES    Procedures      MEDICATIONS GIVEN IN ER    Medications - No data to display      PROGRESS, DATA ANALYSIS, CONSULTS, AND MEDICAL DECISION MAKING    All labs have been independently reviewed by me.  All radiology studies have been reviewed by me and discussed with radiologist dictating the report.   EKG's independently viewed and interpreted by me.  Discussion below represents my analysis of pertinent findings related to patient's condition, differential diagnosis, treatment plan and final disposition.    1352 Discussed pt case with Dr Leos. After seeing the pt, Dr Leos agrees with plan of care to immobilize with a post op shoe and discharge with instruction to f/u with orthopedics.     1423 pt rechecked and resting comfortably. Informed pt of normal XR and plan to discharge with a post op shoe and f/u with orthopedics. Pt understands and agrees with the plan. All questions have been answered.         AS OF 3:34 PM VITALS:    BP - 146/68  HR - 55  TEMP - 97.6 °F (36.4 °C) (Tympanic)  02 SATS - 98%        DIAGNOSIS  Final diagnoses:   Left foot pain          DISPOSITION  DISCHARGE    Patient discharged in stable condition.    Reviewed implications of results, diagnosis, meds, responsibility to follow up, warning signs and symptoms of possible worsening, potential complications and reasons to return to ER, including new or worsening sx.    Patient/Family voiced understanding of above instructions.    Discussed plan for discharge, as there is no emergent indication for admission. Patient referred to primary care provider for BP management due to today's BP. Pt/family is agreeable and understands need for follow up and repeat testing.  Pt is aware that discharge does not mean that nothing is wrong but it indicates no emergency is present that requires admission and they must continue care with follow-up as given below or physician of their choice.     FOLLOW-UP  Damian Chen MD  [address] [facility]    [place] [id number] [phone]    Call       Colt Vega DPSUSY  [address]   [place] [address]  [phone]    Call       Peggy Monae MD  [address]    [place] [address]  [phone]    Call            Medication List      No changes were made to your prescriptions during this visit.               Documentation assistance provided by noé Gibbs for [facility].  Information recorded by the scribe was done at my direction and has been verified and validated by me.               Fanny Gibbs  10/10/19 1425       Marisol Zaragoza APRN  10/10/19 2181

## 2019-10-10 NOTE — DISCHARGE INSTRUCTIONS
Home to rest  Wear post op shoe   Elevate foot,  Ice to sore area  Follow up with DR Vega , or Dr Pete   Return if worse or new concerns   Continue care with your primary care physician and have your blood pressure regularly checked and managed. Normal blood pressure is 120/80.

## 2019-10-14 ENCOUNTER — EPISODE CHANGES (OUTPATIENT)
Dept: CASE MANAGEMENT | Facility: OTHER | Age: 74
End: 2019-10-14

## 2019-10-17 ENCOUNTER — TELEPHONE (OUTPATIENT)
Dept: FAMILY MEDICINE CLINIC | Facility: CLINIC | Age: 74
End: 2019-10-17

## 2019-10-17 DIAGNOSIS — M19.90 ARTHRITIS: Primary | ICD-10-CM

## 2019-10-17 RX ORDER — HYDROCODONE BITARTRATE AND ACETAMINOPHEN 5; 325 MG/1; MG/1
1 TABLET ORAL EVERY 6 HOURS PRN
Qty: 30 TABLET | Refills: 0 | Status: SHIPPED | OUTPATIENT
Start: 2019-10-17 | End: 2019-10-28 | Stop reason: SDUPTHER

## 2019-10-24 DIAGNOSIS — M19.90 ARTHRITIS: ICD-10-CM

## 2019-10-24 RX ORDER — HYDROCODONE BITARTRATE AND ACETAMINOPHEN 5; 325 MG/1; MG/1
1 TABLET ORAL EVERY 6 HOURS PRN
Qty: 30 TABLET | Refills: 0 | Status: CANCELLED | OUTPATIENT
Start: 2019-10-24

## 2019-10-25 ENCOUNTER — TELEPHONE (OUTPATIENT)
Dept: FAMILY MEDICINE CLINIC | Facility: CLINIC | Age: 74
End: 2019-10-25

## 2019-10-25 NOTE — TELEPHONE ENCOUNTER
Pt called asking why he only got 30 hydrocodone tabs this time.  Pt states that he normally takes 1 tab BID and gets #60.  Please advise.

## 2019-10-28 ENCOUNTER — EPISODE CHANGES (OUTPATIENT)
Dept: CASE MANAGEMENT | Facility: OTHER | Age: 74
End: 2019-10-28

## 2019-10-28 DIAGNOSIS — M19.90 ARTHRITIS: ICD-10-CM

## 2019-10-29 RX ORDER — HYDROCODONE BITARTRATE AND ACETAMINOPHEN 5; 325 MG/1; MG/1
1 TABLET ORAL EVERY 6 HOURS PRN
Qty: 30 TABLET | Refills: 0 | Status: SHIPPED | OUTPATIENT
Start: 2019-10-29 | End: 2019-11-27 | Stop reason: SDUPTHER

## 2019-11-04 ENCOUNTER — OFFICE VISIT (OUTPATIENT)
Dept: CARDIOLOGY | Facility: CLINIC | Age: 74
End: 2019-11-04

## 2019-11-04 VITALS
OXYGEN SATURATION: 98 % | BODY MASS INDEX: 26.83 KG/M2 | WEIGHT: 177 LBS | RESPIRATION RATE: 16 BRPM | HEART RATE: 81 BPM | SYSTOLIC BLOOD PRESSURE: 158 MMHG | HEIGHT: 68 IN | DIASTOLIC BLOOD PRESSURE: 80 MMHG

## 2019-11-04 DIAGNOSIS — I77.9 CAROTID ARTERY DISEASE, UNSPECIFIED LATERALITY, UNSPECIFIED TYPE (HCC): ICD-10-CM

## 2019-11-04 DIAGNOSIS — Z98.890 STATUS POST ABLATION OF ATRIAL FLUTTER: ICD-10-CM

## 2019-11-04 DIAGNOSIS — Z86.79 STATUS POST ABLATION OF ATRIAL FLUTTER: ICD-10-CM

## 2019-11-04 DIAGNOSIS — I73.9 PVD (PERIPHERAL VASCULAR DISEASE) (HCC): ICD-10-CM

## 2019-11-04 DIAGNOSIS — I25.810 CORONARY ARTERY DISEASE INVOLVING CORONARY BYPASS GRAFT OF NATIVE HEART WITHOUT ANGINA PECTORIS: Primary | ICD-10-CM

## 2019-11-04 PROCEDURE — 99213 OFFICE O/P EST LOW 20 MIN: CPT | Performed by: INTERNAL MEDICINE

## 2019-11-04 RX ORDER — INFLUENZA A VIRUS A/MICHIGAN/45/2015 X-275 (H1N1) ANTIGEN (FORMALDEHYDE INACTIVATED), INFLUENZA A VIRUS A/SINGAPORE/INFIMH-16-0019/2016 IVR-186 (H3N2) ANTIGEN (FORMALDEHYDE INACTIVATED), AND INFLUENZA B VIRUS B/MARYLAND/15/2016 BX-69A (A B/COLORADO/6/2017-LIKE VIRUS) ANTIGEN (FORMALDEHYDE INACTIVATED) 60; 60; 60 UG/.5ML; UG/.5ML; UG/.5ML
INJECTION, SUSPENSION INTRAMUSCULAR
Refills: 0 | COMMUNITY
Start: 2019-10-25 | End: 2019-12-17

## 2019-11-04 NOTE — PROGRESS NOTES
Date of Office Visit: 2019  Encounter Provider: Eric Wright MD  Place of Service: [facility]  Patient Name: Madhu Santoro  :1945    Chief complaint: Follow-up for coronary artery disease, atrial flutter status   post ablation, and carotid artery disease.    History of Present Illness:    Dear Dr. Chen:      I again had the pleasure of seeing your patient in cardiology office on 2019.  As you   well know, he is a very pleasant 73 year-old white male with a past medical history   significant for coronary artery disease, prior carotid endarterectomy, and atrial flutter   status post ablation who presents for follow-up.  The patient formerly followed with Dr. Roger from the [facility].  He has a history of a carotid endarterectomy   in the past, and also has a history of a stroke for which he took Plavix for many years.       He presented to the [facility] emergency department on 2017 after   his primary care physician noted that he had a rapid heart rate of 150 bpm.  He was   found to be in typical atrial flutter at the time, which was a new diagnosis for him, although   he was completely asymptomatic with regards to the atrial flutter and was unaware that   he was in this.  He also had reported that he had experienced some chest pressure   radiating into his throat earlier in the week.  He was converted to sinus rhythm with an   amiodarone drip, and an echocardiogram on 2017 showed an ejection fraction of   55-60%, and no significant valvular disease.  He ultimately underwent a diagnostic left   heart catheterization on 4/10/2017 by Dr. Healy for presumed unstable angina and a   known history of coronary artery disease.  This showed severe coronary artery disease,   including a distal 80% severely calcified lesion in the left main extending into the ostium   of the left circumflex coronary artery.  He was  then referred for a coronary artery bypass   grafting operation, and underwent this by Dr. Cortez on 4/11/2017.  At that time, he had   a LIMA to LAD, SVG to OM1, and SVG to OM 3.  He did well postoperatively, but continued   to have significant issues with rapid atrial flutter which was largely unresponsive to   amiodarone at the time.  He ultimately underwent a cavotricuspid isthmus atrial flutter   ablation by Dr. Gustafson on 4/18/2017.  A preoperative AR performed the day prior to   this did not show any evidence for thrombus formation.  He did develop bradycardia on   even minimal doses of metoprolol as an outpatient, and this had to be discontinued.      The patient presents today for follow-up.  His main issue recently has been vague and   recurrent abdominal pain which has been thoroughly worked up.  He has not had any   chest or throat pain.  He only gets short of breath randomly, and states that he does not   get this on exertion.  He has had no palpitations.  His blood pressure this morning was   132/72.  He states that it normally runs lower.  He also tells me that it always goes up in   a physician's office.    Past Medical History:   Diagnosis Date   • Anxiety    • Arthritis    • Atrial flutter (CMS/HCC)     Status post cavotricuspid isthmus ablation by Dr. Gustafson on 4/18/17   • Mandel esophagus    • Benign essential hypertension    • CAD (coronary artery disease)     3 vessel CABG 4/11/17 by Dr. Cortez: ROSARIO-prox LAD, SVG-OM1, SVG-OM3   • Carotid artery disease (CMS/HCC)     Status post carotid endarterectomy - USG 4/10/17: 50-59% NICHELLE, 1-15% LICA.    • Colonic polyp    • DDD (degenerative disc disease), lumbosacral    • GERD (gastroesophageal reflux disease)    • H/O bone density study 2013   • H/O complete eye exam 2014   • HLD (hyperlipidemia)    • Hypertension    • Lipid screening 05/31/2013   • Low back pain     [facility]   • Screening for prostate cancer  07/07/2015   • Stroke (CMS/HCC)        Past Surgical History:   Procedure Laterality Date   • CARDIAC CATHETERIZATION N/A 4/10/2017    Procedure: Left Heart Cath;  Surgeon: Marjorie Healy MD;  [facility];  Service:    • CARDIAC CATHETERIZATION N/A 4/10/2017    Procedure: Coronary angiography;  Surgeon: Marjorie Healy MD;  Location:  [facility] CATH INVASIVE LOCATION;  Service:    • CARDIAC CATHETERIZATION N/A 4/10/2017    Procedure: Left ventriculography;  Surgeon: Marjorie Healy MD;  Location:  [facility] CATH INVASIVE LOCATION;  Service:    • CARDIAC ELECTROPHYSIOLOGY PROCEDURE N/A 4/18/2017    Procedure: Ablation atrial flutter;  Surgeon: Jose Antonio Gustafson MD;  [facility];  Service:    • CHOLECYSTECTOMY     • CHOLECYSTECTOMY WITH INTRAOPERATIVE CHOLANGIOGRAM N/A 9/7/2019    Procedure: Laparoscopic cholecystectomy with intraoperative cholangiogram;  Surgeon: Gauri Travis MD;  Location: [facility] MAIN OR;  Service: General   • COLONOSCOPY  01/06/2015    Diverticulosis, one TA   • COLONOSCOPY N/A 2/14/2019    tics, NBIH, adenomatous polyp x 2   • CORONARY ARTERY BYPASS GRAFT N/A 4/11/2017    Procedure: AR STERNOTOMY CORONARY ARTERY BYPASS GRAFT TIMES 3 USING LEFT INTERNAL MAMMARY ARTERY AND LEFT GREATER SAPHENOUS VEIN GRAFT PER ENDOSCOPIC VEIN HARVESTING AND PRP ;  Surgeon: Temo Cortez MD;  Location: [facility] MAIN OR;  Service:    • ENDOSCOPY  01/06/2015    HH, Mandel's esophagus   • ENDOSCOPY N/A 2/14/2019    Z line irregular, HH, Mandel's esophagus   • VASECTOMY         Current Outpatient Medications on File Prior to Visit   Medication Sig Dispense Refill   • albuterol sulfate  (90 Base) MCG/ACT inhaler Inhale 2 puffs Every 4 (Four) Hours As Needed for Wheezing. 1 inhaler 0   • clopidogrel (PLAVIX) 75 MG tablet Take 1 tablet by mouth Daily. 90 tablet 1   • HYDROcodone-acetaminophen (NORCO) 5-325 MG per tablet Take 1 tablet by mouth Every 6 (Six)  Hours As Needed (Patient takes sparingly). 30 tablet 0   • nitroglycerin (NITROSTAT) 0.4 MG SL tablet Place 1 tablet under the tongue Every 5 (Five) Minutes As Needed for Chest Pain. Take no more than 3 doses in 15 minutes. 20 tablet 2   • pantoprazole (PROTONIX) 40 MG EC tablet Take 1 tablet by mouth Daily. 90 tablet 1   • ramipril (ALTACE) 5 MG capsule Take 1 capsule by mouth Daily. 90 capsule 1   • rosuvastatin (CRESTOR) 10 MG tablet Take 1 tablet by mouth Daily. 90 tablet 1   • FLUZONE HIGH-DOSE 0.5 ML suspension prefilled syringe injection TO BE ADMINISTERED BY PHARMACIST FOR IMMUNIZATION  0     No current facility-administered medications on file prior to visit.      Allergies as of 11/04/2019 - Reviewed 11/04/2019   Allergen Reaction Noted   • Atorvastatin Myalgia 05/23/2018   • Penicillins Hives, Swelling, and Rash 05/09/2013     Social History     Socioeconomic History   • Marital status:      Spouse name: Not on file   • Number of children: Not on file   • Years of education: Not on file   • Highest education level: Not on file   Tobacco Use   • Smoking status: Former Smoker   • Smokeless tobacco: Never Used   • Tobacco comment: CAFFEINE USE   Substance and Sexual Activity   • Alcohol use: Yes     Comment: SOCIALLY   • Drug use: No   • Sexual activity: Yes     Partners: Female     Family History   Problem Relation Age of Onset   • Heart disease Mother    • Heart attack Mother    • Stroke Mother    • Heart disease Father    • Heart attack Father    • Stroke Father    • Arthritis Other    • Diabetes Other    • Hypertension Other    • Kidney disease Other         stones   • Hypertension Sister    • Heart attack Brother    • Heart disease Brother    • No Known Problems Maternal Grandmother    • No Known Problems Maternal Grandfather    • No Known Problems Paternal Grandmother    • No Known Problems Paternal Grandfather    • No Known Problems Brother    • Heart disease Brother    • Heart attack Brother   "  • Diabetes Brother    • Pancreatic cancer Paternal Cousin        Review of Systems   Endocrine: Positive for cold intolerance.   Musculoskeletal: Positive for joint pain.   Gastrointestinal: Positive for abdominal pain.   All other systems reviewed and are negative.     Objective:     Vitals:    11/04/19 1113   BP: 158/80   BP Location: Right arm   Patient Position: Sitting   Cuff Size: Adult   Pulse: 81   Resp: 16   SpO2: 98%   Weight: 80.3 kg (177 lb)   Height: 172.7 cm (67.99\")     Body mass index is 26.92 kg/m².    Physical Exam   Constitutional: He is oriented to person, place, and time. He appears well-developed and well-nourished.   HENT:   Head: Normocephalic and atraumatic.   Eyes: Conjunctivae are normal.   Neck: Neck supple.   Cardiovascular: Normal rate and regular rhythm. Exam reveals no gallop and no friction rub.   No murmur heard.  Pulmonary/Chest: Effort normal and breath sounds normal.   Abdominal: Soft. There is no tenderness.   Musculoskeletal: He exhibits no edema.   Neurological: He is alert and oriented to person, place, and time.   Skin: Skin is warm.   Psychiatric: He has a normal mood and affect. His behavior is normal.     Lab Review:   Procedures    Cardiac Procedures:  1.  Echocardiogram on 4/8/2017: The ejection fraction was 55-60%.  The left atrium   was mildly to moderately dilated.  There was mild mitral regurgitation.  2.  Left heart catheterization on 4/10/2017: There was a distal 80% and severely   calcified stenosis in the left main extending into the ostium of left circumflex.  The left   circumflex was dominant, and did have the before mentioned 80% lesion in the ostial   aspect extending from the left main.  The LAD had 20% proximal disease.  The right   coronary artery was small, nondominant, and normal.  3.  Status post 3 vessel coronary artery bypass grafting on 4/11/2017 by Dr. Cortez:   LIMA to proximal LAD, SVG to OM 1, and SVG to OM 3.  4.  Transesophageal echo on " 4/17/2017: The ejection fraction was 55%.  There was   moderate left atrial enlargement and mild to moderate right atrial enlargement.  The   atrial septum was redundant, but no PFO was seen on the saline study.  There was   no significant valvular disease.  5.  Status post cavotricuspid isthmus ablation for typical atrial flutter on 4/18/2017 by Dr. Gustafson.  6.  Carotid Doppler ultrasound on 4/10/2017: There was 50-59% stenosis in the right   internal carotid artery.  There was 1-15% disease in the left internal carotid artery.  7.  Echocardiogram on 10/24/2017: Ejection fraction was 56%.  There was grade 2   diastolic dysfunction.  There was aortic sclerosis without stenosis.    Assessment:       Diagnosis Plan   1. Coronary artery disease involving coronary bypass graft of native heart without angina pectoris     2. Status post ablation of atrial flutter     3. Carotid artery disease, unspecified laterality, unspecified type (CMS/[facility])     4. PVD (peripheral vascular disease) (CMS/[facility])       Plan:       Again, he does state that he has whitecoat hypertension, and his blood pressure earlier today was 132/72.  He tells me that it often runs lower than this as well.  He has had no cardiac symptoms.  He has had a thorough work-up for the abdominal pain, although this continues to be a nagging issue.  He still has some claudication symptoms in his right lower extremity, but is followed by vascular surgery.  He is scheduled to see Dr. Mayorga again soon.  He also had less than 70% disease in his celiac artery.  He will continue on Plavix indefinitely.  He did stop the aspirin secondary to excessive bruising while on both aspirin and Plavix.  He will continue on the Altace for his blood pressure.  I will obtain his most recent lipid panel, and he will remain on the Crestor 10 mg/day for now.  He does have nitroglycerin as needed at home.  He is not on beta-blockers since he developed significant bradycardia even with  minimal doses in the past.  He has not had any atrial flutter since his ablation in 2017.  I will plan on seeing him back in the office in 6 months.

## 2019-11-12 ENCOUNTER — EPISODE CHANGES (OUTPATIENT)
Dept: CASE MANAGEMENT | Facility: OTHER | Age: 74
End: 2019-11-12

## 2019-11-21 ENCOUNTER — EPISODE CHANGES (OUTPATIENT)
Dept: CASE MANAGEMENT | Facility: OTHER | Age: 74
End: 2019-11-21

## 2019-11-27 DIAGNOSIS — M19.90 ARTHRITIS: ICD-10-CM

## 2019-11-28 RX ORDER — HYDROCODONE BITARTRATE AND ACETAMINOPHEN 5; 325 MG/1; MG/1
1 TABLET ORAL EVERY 6 HOURS PRN
Qty: 30 TABLET | Refills: 0 | Status: SHIPPED | OUTPATIENT
Start: 2019-11-28 | End: 2019-12-17

## 2019-12-10 LAB
ALBUMIN SERPL-MCNC: 4.2 G/DL (ref 3.5–5.2)
ALBUMIN/GLOB SERPL: 1.6 G/DL
ALP SERPL-CCNC: 117 U/L (ref 39–117)
ALT SERPL-CCNC: 16 U/L (ref 1–41)
AST SERPL-CCNC: 23 U/L (ref 1–40)
BILIRUB SERPL-MCNC: 0.7 MG/DL (ref 0.2–1.2)
BUN SERPL-MCNC: 15 MG/DL (ref 8–23)
BUN/CREAT SERPL: 14.7 (ref 7–25)
CALCIUM SERPL-MCNC: 9.7 MG/DL (ref 8.6–10.5)
CHLORIDE SERPL-SCNC: 101 MMOL/L (ref 98–107)
CO2 SERPL-SCNC: 28 MMOL/L (ref 22–29)
CREAT SERPL-MCNC: 1.02 MG/DL (ref 0.76–1.27)
GLOBULIN SER CALC-MCNC: 2.7 GM/DL
GLUCOSE SERPL-MCNC: 89 MG/DL (ref 65–99)
POTASSIUM SERPL-SCNC: 5.6 MMOL/L (ref 3.5–5.2)
PROT SERPL-MCNC: 6.9 G/DL (ref 6–8.5)
SODIUM SERPL-SCNC: 141 MMOL/L (ref 136–145)

## 2019-12-17 ENCOUNTER — OFFICE VISIT (OUTPATIENT)
Dept: FAMILY MEDICINE CLINIC | Facility: CLINIC | Age: 74
End: 2019-12-17

## 2019-12-17 VITALS
OXYGEN SATURATION: 99 % | BODY MASS INDEX: 26.37 KG/M2 | SYSTOLIC BLOOD PRESSURE: 157 MMHG | RESPIRATION RATE: 16 BRPM | HEIGHT: 68 IN | TEMPERATURE: 97.7 F | HEART RATE: 48 BPM | DIASTOLIC BLOOD PRESSURE: 69 MMHG | WEIGHT: 174 LBS

## 2019-12-17 DIAGNOSIS — M19.90 ARTHRITIS: ICD-10-CM

## 2019-12-17 DIAGNOSIS — I25.10 CHRONIC CORONARY ARTERY DISEASE: Primary | ICD-10-CM

## 2019-12-17 PROCEDURE — 99213 OFFICE O/P EST LOW 20 MIN: CPT | Performed by: FAMILY MEDICINE

## 2019-12-17 RX ORDER — HYDROCODONE BITARTRATE AND ACETAMINOPHEN 5; 325 MG/1; MG/1
TABLET ORAL
Qty: 60 TABLET | Refills: 0 | Status: SHIPPED | OUTPATIENT
Start: 2019-12-17 | End: 2020-01-17 | Stop reason: SDUPTHER

## 2019-12-17 RX ORDER — FLUOROURACIL 50 MG/G
CREAM TOPICAL
COMMUNITY
Start: 2019-11-14 | End: 2021-03-03

## 2019-12-17 NOTE — PROGRESS NOTES
Subjective   Chief Complaint:   Chief Complaint   Patient presents with   • Degenerative Disc Disease         History of Present Illness     The patient has read and signed the [facility] Controlled Substance Contract.  I will continue to see patient for regular follow up appointments.  They are well controlled on their medication.  RUTH has been reviewed by me and is updated every 3 months. The patient is aware of the potential for addiction and dependence.  Here again to refill Norco 5/3/2025 1 every 12 hours as needed pain he gets 60 with 2 refills.  This is the first refill of the first 60 tablets today and again is for degenerative disc disease in his back he gets by with taking 1 twice daily as needed pain he has a generalized arthritis also and pain in all joints but the back or that is the worst so he is got degenerative disease disc disease in his back.  Refill on the medicine and he will be due again in 90 days to refill this medicine.  He is done well with this dose and I am in a keep at the same and he is going to see me again in 3 months.  Dr. Wright for heart issues and he sees Dr. Mayorga for his carotids.  Also sees Dr. Mayorga about carotid stenosis and he had surgery on his left carotid and his right carotid has a 65% blockage but he is going back next year and following him up with that he says also I  refill the Plavix and the Nitrostat and the Protonix and the Altace and the Crestor but he does not need refills of that now to repeat the BMP but do that next week and wanted to drink lots of fluids before he did then call me for the results          Madhu Santoro 74 y.o. male who presents today for Medical Management of the below listed issues and medication refills.    ICD-10-CM ICD-9-CM   1. Chronic coronary artery disease I25.10 414.00   2. Arthritis M19.90 716.90        he has a problem list of   Patient Active Problem List   Diagnosis   • Anxiety   • Arthritis   • Chronic coronary  "artery disease   • HLD (hyperlipidemia)   • Benign essential hypertension   • DDD (degenerative disc disease), lumbosacral   • Cerebrovascular accident (CMS/HCC)   • Encounter for screening for cardiovascular disorders   • Gastroesophageal reflux disease   • Hyperlipidemia   • Stenosis of carotid artery   • Former smoker   • History of cardiac catheterization   • S/P ablation of atrial flutter   • Typical atrial flutter (CMS/HCC)   • S/P CABG (coronary artery bypass graft)   • Adenomatous polyp of ascending colon   • Abdominal bloating   • Stroke (CMS/HCC)   • PAD (peripheral artery disease) (CMS/HCC)   • Acute cholecystitis   • Right upper quadrant abdominal pain   .    he has been compliant with   Current Outpatient Medications:   •  albuterol sulfate  (90 Base) MCG/ACT inhaler, Inhale 2 puffs Every 4 (Four) Hours As Needed for Wheezing., Disp: 1 inhaler, Rfl: 0  •  clopidogrel (PLAVIX) 75 MG tablet, Take 1 tablet by mouth Daily., Disp: 90 tablet, Rfl: 1  •  fluorouracil (EFUDEX) 5 % cream, , Disp: , Rfl:   •  HYDROcodone-acetaminophen (NORCO) 5-325 MG per tablet, Take 1 tablet by mouth Every 6 (Six) Hours As Needed (Patient takes sparingly)., Disp: 30 tablet, Rfl: 0  •  nitroglycerin (NITROSTAT) 0.4 MG SL tablet, Place 1 tablet under the tongue Every 5 (Five) Minutes As Needed for Chest Pain. Take no more than 3 doses in 15 minutes., Disp: 20 tablet, Rfl: 2  •  pantoprazole (PROTONIX) 40 MG EC tablet, Take 1 tablet by mouth Daily., Disp: 90 tablet, Rfl: 1  •  ramipril (ALTACE) 5 MG capsule, Take 1 capsule by mouth Daily., Disp: 90 capsule, Rfl: 1  •  rosuvastatin (CRESTOR) 10 MG tablet, Take 1 tablet by mouth Daily., Disp: 90 tablet, Rfl: 1.  he denies medication side effects.        /69   Pulse (!) 48   Temp 97.7 °F (36.5 °C)   Resp 16   Ht 172.7 cm (67.99\")   Wt 78.9 kg (174 lb)   SpO2 99%   BMI 26.46 kg/m²     Results for orders placed or performed in visit on 09/16/19   Comprehensive " metabolic panel   Result Value Ref Range    Glucose 89 65 - 99 mg/dL    BUN 15 8 - 23 mg/dL    Creatinine 1.02 0.76 - 1.27 mg/dL    eGFR Non African Am 71 >60 mL/min/1.73    eGFR African Am 87 >60 mL/min/1.73    BUN/Creatinine Ratio 14.7 7.0 - 25.0    Sodium 141 136 - 145 mmol/L    Potassium 5.6 (H) 3.5 - 5.2 mmol/L    Chloride 101 98 - 107 mmol/L    Total CO2 28.0 22.0 - 29.0 mmol/L    Calcium 9.7 8.6 - 10.5 mg/dL    Total Protein 6.9 6.0 - 8.5 g/dL    Albumin 4.20 3.50 - 5.20 g/dL    Globulin 2.7 gm/dL    A/G Ratio 1.6 g/dL    Total Bilirubin 0.7 0.2 - 1.2 mg/dL    Alkaline Phosphatase 117 39 - 117 U/L    AST (SGOT) 23 1 - 40 U/L    ALT (SGPT) 16 1 - 41 U/L       The following portions of the patient's history were reviewed and updated as appropriate: allergies, current medications, past medical history, past social history, past surgical history and problem list.      he has a history of   Patient Active Problem List   Diagnosis   • Anxiety   • Arthritis   • Chronic coronary artery disease   • HLD (hyperlipidemia)   • Benign essential hypertension   • DDD (degenerative disc disease), lumbosacral   • Cerebrovascular accident (CMS/HCC)   • Encounter for screening for cardiovascular disorders   • Gastroesophageal reflux disease   • Hyperlipidemia   • Stenosis of carotid artery   • Former smoker   • History of cardiac catheterization   • S/P ablation of atrial flutter   • Typical atrial flutter (CMS/HCC)   • S/P CABG (coronary artery bypass graft)   • Adenomatous polyp of ascending colon   • Abdominal bloating   • Stroke (CMS/HCC)   • PAD (peripheral artery disease) (CMS/HCC)   • Acute cholecystitis   • Right upper quadrant abdominal pain       Review of Systems   Constitutional: Negative for activity change, appetite change and unexpected weight change.   Eyes: Negative for visual disturbance.   Respiratory: Negative for chest tightness and shortness of breath.    Cardiovascular: Negative for chest pain and  palpitations.   Skin: Negative for color change.   Neurological: Negative for syncope and speech difficulty.   Psychiatric/Behavioral: Negative for confusion and decreased concentration.       Objective   Physical Exam   Constitutional: He is oriented to person, place, and time. He appears well-developed and well-nourished.   HENT:   Head: Atraumatic.   Mouth/Throat: Oropharynx is clear and moist.   Eyes: Pupils are equal, round, and reactive to light. EOM are normal.   Neck: Normal range of motion. Neck supple. No thyromegaly present.   Cardiovascular: Normal rate and regular rhythm.   Pulmonary/Chest: Effort normal and breath sounds normal.   Abdominal: Soft.   Musculoskeletal: Normal range of motion.   Neurological: He is alert and oriented to person, place, and time.   Skin: Skin is warm and dry.   Psychiatric: He has a normal mood and affect. His behavior is normal.   Nursing note and vitals reviewed.      Assessment/Plan   Madhu was seen today for degenerative disc disease.    Diagnoses and all orders for this visit:    Chronic coronary artery disease    Arthritis    hyperkalemia         I want you to drink lots of fluids and get your potassium repeated next week but drink lots of fluids the day before and the day of and I will put the lab order over there for you to do in about a week.  She is to continue to work on your cholesterol.  I refilled your Norco today and you can take 1 tablet twice a day as needed and I am giving you 60 for the month and you will call me again when you need the refill the next month.  So essentially repeat this lab next week and want to drink lots of fluids the day before and the day of repeating the BMP

## 2019-12-17 NOTE — PATIENT INSTRUCTIONS
If lab was order today please have them done.   Exercise 30 minutes most days of the week  Sleep 6-8 hours each night if possible  Low fat, low cholesterol diet   Low glycemic index diet     Labs results discussed in detail with the patient, if no recent labs were done, order placed today.  Plan to update vaccines if needed today.  I  reviewed health maintenance with the patient as part of my preventative care plan. I discussed preventative counseling with the patient in detail.

## 2019-12-23 NOTE — OUTREACH NOTE
General Surgery Week 3 Survey      Responses   Facility patient discharged from?  [facility]   Does the patient have one of the following disease processes/diagnoses(primary or secondary)?  General Surgery   Week 3 attempt successful?  Yes   Call start time  0837   Call end time  0839   Meds reviewed with patient/caregiver?  Yes   Is the patient taking all medications as directed (includes completed medication regime)?  Yes   Has the patient kept scheduled appointments due by today?  Yes   What is the patient's perception of their health status since discharge?  Improving   Week 3 call completed?  Yes   Revoked  No further contact(revokes)-requires comment   Graduated/Revoked comments  He states has been to Dr. garza and is doing well, no new issues          Kezia Sorenson RN  
DISPLAY PLAN FREE TEXT

## 2019-12-28 LAB
BUN SERPL-MCNC: 11 MG/DL (ref 8–23)
BUN/CREAT SERPL: 11.7 (ref 7–25)
CALCIUM SERPL-MCNC: 9.6 MG/DL (ref 8.6–10.5)
CHLORIDE SERPL-SCNC: 101 MMOL/L (ref 98–107)
CO2 SERPL-SCNC: 23.7 MMOL/L (ref 22–29)
CREAT SERPL-MCNC: 0.94 MG/DL (ref 0.76–1.27)
GLUCOSE SERPL-MCNC: 97 MG/DL (ref 65–99)
POTASSIUM SERPL-SCNC: 4.8 MMOL/L (ref 3.5–5.2)
SODIUM SERPL-SCNC: 138 MMOL/L (ref 136–145)

## 2020-01-01 ENCOUNTER — RESULTS ENCOUNTER (OUTPATIENT)
Dept: FAMILY MEDICINE CLINIC | Facility: CLINIC | Age: 75
End: 2020-01-01

## 2020-01-01 DIAGNOSIS — I25.10 CHRONIC CORONARY ARTERY DISEASE: ICD-10-CM

## 2020-01-01 DIAGNOSIS — M19.90 ARTHRITIS: ICD-10-CM

## 2020-01-17 DIAGNOSIS — M19.90 ARTHRITIS: ICD-10-CM

## 2020-01-21 RX ORDER — HYDROCODONE BITARTRATE AND ACETAMINOPHEN 5; 325 MG/1; MG/1
TABLET ORAL
Qty: 60 TABLET | Refills: 0 | Status: SHIPPED | OUTPATIENT
Start: 2020-01-21 | End: 2020-02-21 | Stop reason: SDUPTHER

## 2020-01-26 ENCOUNTER — HOSPITAL ENCOUNTER (EMERGENCY)
Facility: HOSPITAL | Age: 75
Discharge: HOME | End: 2020-01-26
Attending: EMERGENCY MEDICINE | Admitting: EMERGENCY MEDICINE

## 2020-01-26 VITALS
OXYGEN SATURATION: 98 % | HEART RATE: 52 BPM | HEIGHT: 68 IN | TEMPERATURE: 97.6 F | BODY MASS INDEX: 26.07 KG/M2 | DIASTOLIC BLOOD PRESSURE: 77 MMHG | WEIGHT: 172 LBS | RESPIRATION RATE: 16 BRPM | SYSTOLIC BLOOD PRESSURE: 128 MMHG

## 2020-01-26 DIAGNOSIS — N28.9 MILD RENAL INSUFFICIENCY: ICD-10-CM

## 2020-01-26 DIAGNOSIS — R11.2 NON-INTRACTABLE VOMITING WITH NAUSEA, UNSPECIFIED VOMITING TYPE: ICD-10-CM

## 2020-01-26 DIAGNOSIS — R10.13 EPIGASTRIC PAIN: Primary | ICD-10-CM

## 2020-01-26 LAB
ALBUMIN SERPL-MCNC: 3.9 G/DL (ref 3.5–5.2)
ALBUMIN/GLOB SERPL: 1.2 G/DL
ALP SERPL-CCNC: 107 U/L (ref 39–117)
ALT SERPL W P-5'-P-CCNC: 32 U/L (ref 1–41)
ANION GAP SERPL CALCULATED.3IONS-SCNC: 12.7 MMOL/L (ref 5–15)
AST SERPL-CCNC: 33 U/L (ref 1–40)
BASOPHILS # BLD AUTO: 0.04 10*3/MM3 (ref 0–0.2)
BASOPHILS NFR BLD AUTO: 0.3 % (ref 0–1.5)
BILIRUB SERPL-MCNC: 1.2 MG/DL (ref 0.2–1.2)
BUN BLD-MCNC: 24 MG/DL (ref 8–23)
BUN/CREAT SERPL: 18.3 (ref 7–25)
CALCIUM SPEC-SCNC: 9.6 MG/DL (ref 8.6–10.5)
CHLORIDE SERPL-SCNC: 99 MMOL/L (ref 98–107)
CO2 SERPL-SCNC: 28.3 MMOL/L (ref 22–29)
CREAT BLD-MCNC: 1.31 MG/DL (ref 0.76–1.27)
DEPRECATED RDW RBC AUTO: 44.6 FL (ref 37–54)
EOSINOPHIL # BLD AUTO: 0.03 10*3/MM3 (ref 0–0.4)
EOSINOPHIL NFR BLD AUTO: 0.2 % (ref 0.3–6.2)
ERYTHROCYTE [DISTWIDTH] IN BLOOD BY AUTOMATED COUNT: 13.1 % (ref 12.3–15.4)
GFR SERPL CREATININE-BSD FRML MDRD: 53 ML/MIN/1.73
GLOBULIN UR ELPH-MCNC: 3.3 GM/DL
GLUCOSE BLD-MCNC: 106 MG/DL (ref 65–99)
HCT VFR BLD AUTO: 52.3 % (ref 37.5–51)
HGB BLD-MCNC: 17 G/DL (ref 13–17.7)
IMM GRANULOCYTES # BLD AUTO: 0.16 10*3/MM3 (ref 0–0.05)
IMM GRANULOCYTES NFR BLD AUTO: 1.1 % (ref 0–0.5)
LIPASE SERPL-CCNC: 11 U/L (ref 13–60)
LYMPHOCYTES # BLD AUTO: 1.64 10*3/MM3 (ref 0.7–3.1)
LYMPHOCYTES NFR BLD AUTO: 11.4 % (ref 19.6–45.3)
MCH RBC QN AUTO: 29.8 PG (ref 26.6–33)
MCHC RBC AUTO-ENTMCNC: 32.5 G/DL (ref 31.5–35.7)
MCV RBC AUTO: 91.8 FL (ref 79–97)
MONOCYTES # BLD AUTO: 1.23 10*3/MM3 (ref 0.1–0.9)
MONOCYTES NFR BLD AUTO: 8.6 % (ref 5–12)
NEUTROPHILS # BLD AUTO: 11.23 10*3/MM3 (ref 1.7–7)
NEUTROPHILS NFR BLD AUTO: 78.4 % (ref 42.7–76)
NRBC BLD AUTO-RTO: 0 /100 WBC (ref 0–0.2)
PLATELET # BLD AUTO: 383 10*3/MM3 (ref 140–450)
PMV BLD AUTO: 9.8 FL (ref 6–12)
POTASSIUM BLD-SCNC: 4.6 MMOL/L (ref 3.5–5.2)
PROT SERPL-MCNC: 7.2 G/DL (ref 6–8.5)
RBC # BLD AUTO: 5.7 10*6/MM3 (ref 4.14–5.8)
SODIUM BLD-SCNC: 140 MMOL/L (ref 136–145)
TROPONIN T SERPL-MCNC: <0.01 NG/ML (ref 0–0.03)
WBC NRBC COR # BLD: 14.33 10*3/MM3 (ref 3.4–10.8)

## 2020-01-26 PROCEDURE — 93010 ELECTROCARDIOGRAM REPORT: CPT | Performed by: INTERNAL MEDICINE

## 2020-01-26 PROCEDURE — 83690 ASSAY OF LIPASE: CPT | Performed by: EMERGENCY MEDICINE

## 2020-01-26 PROCEDURE — 96361 HYDRATE IV INFUSION ADD-ON: CPT

## 2020-01-26 PROCEDURE — 85025 COMPLETE CBC W/AUTO DIFF WBC: CPT | Performed by: EMERGENCY MEDICINE

## 2020-01-26 PROCEDURE — 99283 EMERGENCY DEPT VISIT LOW MDM: CPT

## 2020-01-26 PROCEDURE — 93005 ELECTROCARDIOGRAM TRACING: CPT | Performed by: EMERGENCY MEDICINE

## 2020-01-26 PROCEDURE — 96375 TX/PRO/DX INJ NEW DRUG ADDON: CPT

## 2020-01-26 PROCEDURE — 96374 THER/PROPH/DIAG INJ IV PUSH: CPT

## 2020-01-26 PROCEDURE — 84484 ASSAY OF TROPONIN QUANT: CPT | Performed by: EMERGENCY MEDICINE

## 2020-01-26 PROCEDURE — 80053 COMPREHEN METABOLIC PANEL: CPT | Performed by: EMERGENCY MEDICINE

## 2020-01-26 PROCEDURE — 25010000002 ONDANSETRON PER 1 MG: Performed by: EMERGENCY MEDICINE

## 2020-01-26 RX ORDER — ALUMINA, MAGNESIA, AND SIMETHICONE 2400; 2400; 240 MG/30ML; MG/30ML; MG/30ML
15 SUSPENSION ORAL ONCE
Status: COMPLETED | OUTPATIENT
Start: 2020-01-26 | End: 2020-01-26

## 2020-01-26 RX ORDER — ONDANSETRON 4 MG/1
4 TABLET, ORALLY DISINTEGRATING ORAL EVERY 6 HOURS PRN
Qty: 12 TABLET | Refills: 0 | Status: SHIPPED | OUTPATIENT
Start: 2020-01-26 | End: 2021-03-03

## 2020-01-26 RX ORDER — DICYCLOMINE HCL 20 MG
20 TABLET ORAL EVERY 6 HOURS PRN
Qty: 20 TABLET | Refills: 0 | Status: SHIPPED | OUTPATIENT
Start: 2020-01-26 | End: 2021-03-03

## 2020-01-26 RX ORDER — FAMOTIDINE 10 MG/ML
20 INJECTION, SOLUTION INTRAVENOUS ONCE
Status: COMPLETED | OUTPATIENT
Start: 2020-01-26 | End: 2020-01-26

## 2020-01-26 RX ORDER — ONDANSETRON 2 MG/ML
4 INJECTION INTRAMUSCULAR; INTRAVENOUS ONCE
Status: COMPLETED | OUTPATIENT
Start: 2020-01-26 | End: 2020-01-26

## 2020-01-26 RX ORDER — LIDOCAINE HYDROCHLORIDE 20 MG/ML
15 SOLUTION OROPHARYNGEAL ONCE
Status: COMPLETED | OUTPATIENT
Start: 2020-01-26 | End: 2020-01-26

## 2020-01-26 RX ORDER — SODIUM CHLORIDE 0.9 % (FLUSH) 0.9 %
10 SYRINGE (ML) INJECTION AS NEEDED
Status: DISCONTINUED | OUTPATIENT
Start: 2020-01-26 | End: 2020-01-26 | Stop reason: HOSPADM

## 2020-01-26 RX ADMIN — SODIUM CHLORIDE, POTASSIUM CHLORIDE, SODIUM LACTATE AND CALCIUM CHLORIDE 1000 ML: 600; 310; 30; 20 INJECTION, SOLUTION INTRAVENOUS at 14:47

## 2020-01-26 RX ADMIN — ALUMINUM HYDROXIDE, MAGNESIUM HYDROXIDE, AND DIMETHICONE 15 ML: 400; 400; 40 SUSPENSION ORAL at 16:12

## 2020-01-26 RX ADMIN — ONDANSETRON 4 MG: 2 INJECTION INTRAMUSCULAR; INTRAVENOUS at 14:52

## 2020-01-26 RX ADMIN — FAMOTIDINE 20 MG: 10 INJECTION INTRAVENOUS at 14:52

## 2020-01-26 RX ADMIN — LIDOCAINE HYDROCHLORIDE 15 ML: 20 SOLUTION ORAL; TOPICAL at 16:12

## 2020-01-26 NOTE — ED NOTES
Pt ambulatory c steady gait, AAOx4, ABC's intact, NAD noted at this time. Pt discharged c belongings, prescription, and educational packet        Tadeo Centeno, CLAUDIO  01/26/20 0858

## 2020-01-26 NOTE — ED NOTES
Pt complains of abdominal pain, vomiting and dark urine that began yesterday. Sent to ER from Luisa Crump, RN  01/26/20 7494

## 2020-01-26 NOTE — DISCHARGE INSTRUCTIONS
Take medications as prescribed.  Follow with your primary care doctor the next 1 to 2 days if symptoms persist.  Return to emergency department for worsening pain, persistent vomiting, fever, pain, shortness of breath, or other concern.

## 2020-01-26 NOTE — ED PROVIDER NOTES
" EMERGENCY DEPARTMENT ENCOUNTER    CHIEF COMPLAINT  Chief Complaint: abd pain  History given by: patient  History limited by: nothing  Room Number: 40/40  PMD: Damian Chen MD      HPI:  Pt is a 74 y.o. male who presents complaining of mid abd pain that started yesterday. Pt also c/o nausea, vomiting, dysuria, chills and \"dark\" urine. Pt denies diarrhea, difficulty urinating, SOA and CP. The pt developed mid abd pain yesterday followed by nausea and multiple episodes of vomiting yesterday afternoon. He took a hydrocodone and Mylanta which seemed to improve sx's temporarily. Upon waking today, his abd pain persisted and the pt was seen in the [facility], who referred him to the ED for further evaluation. Pt has no known hx of stomach ulcers. PSHx of cholecystectomy     Duration: yesterday  Onset: gradual  Timing: constant  Location: mid abd  Radiation: none stated  Quality: pain  Intensity/Severity: moderate  Progression: improved  Associated Symptoms: nausea, dysuria, vomiting, chills and \"dark\" urine  Aggravating Factors: none stated  Alleviating Factors: none stated  Previous Episodes: none stated  Treatment before arrival: Pt was seen in the [facility] who referred him to the ED for further evaluation.    PAST MEDICAL HISTORY  Active Ambulatory Problems     Diagnosis Date Noted   • Anxiety    • Arthritis    • Chronic coronary artery disease    • HLD (hyperlipidemia)    • Benign essential hypertension    • DDD (degenerative disc disease), lumbosacral    • Cerebrovascular accident (CMS/HCC)    • Encounter for screening for cardiovascular disorders 11/20/2012   • Gastroesophageal reflux disease 04/04/2016   • Hyperlipidemia 04/04/2016   • Stenosis of carotid artery 04/26/2017   • Former smoker 04/26/2017   • History of cardiac catheterization 12/16/2011   • S/P ablation of atrial flutter 04/26/2017   • Typical atrial flutter (CMS/HCC) 04/26/2017   • S/P CABG (coronary artery bypass graft) 04/26/2017   • Adenomatous polyp of " ascending colon 02/12/2019   • Abdominal bloating 02/12/2019   • Stroke (CMS/[facility]) 03/29/2019   • PAD (peripheral artery disease) (CMS/[facility]) 03/29/2019   • Acute cholecystitis 09/06/2019   • Right upper quadrant abdominal pain 09/06/2019     Resolved Ambulatory Problems     Diagnosis Date Noted   • No Resolved Ambulatory Problems     Past Medical History:   Diagnosis Date   • Atrial flutter (CMS/[facility])    • Mandel esophagus    • CAD (coronary artery disease)    • Carotid artery disease (CMS/[facility])    • Colonic polyp    • GERD (gastroesophageal reflux disease)    • H/O bone density study 2013   • H/O complete eye exam 2014   • Hypertension    • Lipid screening 05/31/2013   • Low back pain    • Screening for prostate cancer 07/07/2015       PAST SURGICAL HISTORY  Past Surgical History:   Procedure Laterality Date   • CARDIAC CATHETERIZATION N/A 4/10/2017    Procedure: Left Heart Cath;  Surgeon: Marjorie Healy MD;  Location: [facility] CATH INVASIVE LOCATION;  Service:    • CARDIAC CATHETERIZATION N/A 4/10/2017    Procedure: Coronary angiography;  Surgeon: Marjorie Healy MD;  Location: [facility] CATH INVASIVE LOCATION;  Service:    • CARDIAC CATHETERIZATION N/A 4/10/2017    Procedure: Left ventriculography;  Surgeon: Marjorie Healy MD;  [facility];  Service:    • CARDIAC ELECTROPHYSIOLOGY PROCEDURE N/A 4/18/2017    Procedure: Ablation atrial flutter;  Surgeon: Jose Antonio Gustafson MD;  [facility];  Service:    • CHOLECYSTECTOMY     • CHOLECYSTECTOMY WITH INTRAOPERATIVE CHOLANGIOGRAM N/A 9/7/2019    Procedure: Laparoscopic cholecystectomy with intraoperative cholangiogram;  Surgeon: Gauri Travis MD;  Location: [facility] MAIN OR;  Service: General   • COLONOSCOPY  01/06/2015    Diverticulosis, one TA   • COLONOSCOPY N/A 2/14/2019    tics, NBIH, adenomatous polyp x 2   • CORONARY ARTERY BYPASS GRAFT N/A 4/11/2017    Procedure: AR STERNOTOMY CORONARY ARTERY BYPASS GRAFT TIMES  3 USING LEFT INTERNAL MAMMARY ARTERY AND LEFT GREATER SAPHENOUS VEIN GRAFT PER ENDOSCOPIC VEIN HARVESTING AND PRP ;  Surgeon: Temo Cortez MD;  Location: [facility] OR;  Service:    • ENDOSCOPY  01/06/2015    HH, Mandel's esophagus   • ENDOSCOPY N/A 2/14/2019    Z line irregular, HH, Mandel's esophagus   • VASECTOMY         FAMILY HISTORY  Family History   Problem Relation Age of Onset   • Heart disease Mother    • Heart attack Mother    • Stroke Mother    • Heart disease Father    • Heart attack Father    • Stroke Father    • Arthritis Other    • Diabetes Other    • Hypertension Other    • Kidney disease Other         stones   • Hypertension Sister    • Heart attack Brother    • Heart disease Brother    • No Known Problems Maternal Grandmother    • No Known Problems Maternal Grandfather    • No Known Problems Paternal Grandmother    • No Known Problems Paternal Grandfather    • No Known Problems Brother    • Heart disease Brother    • Heart attack Brother    • Diabetes Brother    • Pancreatic cancer Paternal Cousin        SOCIAL HISTORY  Social History     Socioeconomic History   • Marital status:      Spouse name: Not on file   • Number of children: Not on file   • Years of education: Not on file   • Highest education level: Not on file   Tobacco Use   • Smoking status: Former Smoker   • Smokeless tobacco: Never Used   • Tobacco comment: CAFFEINE USE   Substance and Sexual Activity   • Alcohol use: Yes     Comment: SOCIALLY   • Drug use: No   • Sexual activity: Yes     Partners: Female       ALLERGIES  Atorvastatin and Penicillins    REVIEW OF SYSTEMS  Review of Systems   Constitutional: Positive for chills. Negative for activity change, appetite change and fever.   HENT: Negative for congestion and sore throat.    Respiratory: Negative for cough and shortness of breath.    Cardiovascular: Negative for chest pain and leg swelling.   Gastrointestinal: Positive for abdominal pain, nausea and  "vomiting. Negative for diarrhea.   Genitourinary: Positive for dysuria. Negative for decreased urine volume.        (+) \"dark urine   Musculoskeletal: Negative for neck pain.   Skin: Negative for rash and wound.   Neurological: Negative for weakness, numbness and headaches.   All other systems reviewed and are negative.      PHYSICAL EXAM  ED Triage Vitals   Temp Heart Rate Resp BP SpO2   01/26/20 1421 01/26/20 1420 01/26/20 1420 -- 01/26/20 1420   97.6 °F (36.4 °C) 75 16  97 %      Temp src Heart Rate Source Patient Position BP Location FiO2 (%)   01/26/20 1421 -- -- -- --   Tympanic           Physical Exam   Constitutional: He is oriented to person, place, and time. No distress.   HENT:   Head: Normocephalic and atraumatic.   Mouth/Throat: Oropharynx is clear and moist and mucous membranes are normal.   Eyes: Pupils are equal, round, and reactive to light. EOM are normal.   Neck: Normal range of motion. Neck supple.   Cardiovascular: Normal rate, regular rhythm and normal heart sounds.   Pulmonary/Chest: Effort normal and breath sounds normal. No respiratory distress.   Abdominal: Soft. Bowel sounds are normal. There is tenderness (mild) in the epigastric area. There is no rebound and no guarding.   Musculoskeletal: Normal range of motion. He exhibits no edema.   Neurological: He is alert and oriented to person, place, and time. He has normal sensation and normal strength.   Skin: Skin is warm and dry.   Psychiatric: Mood and affect normal.   Nursing note and vitals reviewed.      LAB RESULTS  Lab Results (last 24 hours)     Procedure Component Value Units Date/Time    CBC & Differential [[id number]] Collected:  01/26/20 1445    Specimen:  Blood Updated:  01/26/20 1501    Narrative:       The following orders were created for panel order CBC & Differential.  Procedure                               Abnormality         Status                     ---------                               -----------         ------       "               CBC Auto Differential[[id number]]        Abnormal            Final result                 Please view results for these tests on the individual orders.    Comprehensive Metabolic Panel [[id number]]  (Abnormal) Collected:  01/26/20 1445    Specimen:  Blood Updated:  01/26/20 1525     Glucose 106 mg/dL      BUN 24 mg/dL      Creatinine 1.31 mg/dL      Sodium 140 mmol/L      Potassium 4.6 mmol/L      Chloride 99 mmol/L      CO2 28.3 mmol/L      Calcium 9.6 mg/dL      Total Protein 7.2 g/dL      Albumin 3.90 g/dL      ALT (SGPT) 32 U/L      AST (SGOT) 33 U/L      Alkaline Phosphatase 107 U/L      Total Bilirubin 1.2 mg/dL      eGFR Non African Amer 53 mL/min/1.73      Globulin 3.3 gm/dL      A/G Ratio 1.2 g/dL      BUN/Creatinine Ratio 18.3     Anion Gap 12.7 mmol/L     Narrative:       GFR Normal >60  Chronic Kidney Disease <60  Kidney Failure <15      Lipase [[id number]]  (Abnormal) Collected:  01/26/20 1445    Specimen:  Blood Updated:  01/26/20 1525     Lipase 11 U/L     Troponin [[id number]]  (Normal) Collected:  01/26/20 1445    Specimen:  Blood Updated:  01/26/20 1525     Troponin T <0.010 ng/mL     Narrative:       Troponin T Reference Range:  <= 0.03 ng/mL-   Negative for AMI  >0.03 ng/mL-     Abnormal for myocardial necrosis.  Clinicians would have to utilize clinical acumen, EKG, Troponin and serial changes to determine if it is an Acute Myocardial Infarction or myocardial injury due to an underlying chronic condition.       Results may be falsely decreased if patient taking Biotin.      CBC Auto Differential [[id number]]  (Abnormal) Collected:  01/26/20 1445    Specimen:  Blood Updated:  01/26/20 1501     WBC 14.33 10*3/mm3      RBC 5.70 10*6/mm3      Hemoglobin 17.0 g/dL      Hematocrit 52.3 %      MCV 91.8 fL      MCH 29.8 pg      MCHC 32.5 g/dL      RDW 13.1 %      RDW-SD 44.6 fl      MPV 9.8 fL      Platelets 383 10*3/mm3      Neutrophil % 78.4 %      Lymphocyte % 11.4 %      Monocyte % 8.6 %       Eosinophil % 0.2 %      Basophil % 0.3 %      Immature Grans % 1.1 %      Neutrophils, Absolute 11.23 10*3/mm3      Lymphocytes, Absolute 1.64 10*3/mm3      Monocytes, Absolute 1.23 10*3/mm3      Eosinophils, Absolute 0.03 10*3/mm3      Basophils, Absolute 0.04 10*3/mm3      Immature Grans, Absolute 0.16 10*3/mm3      nRBC 0.0 /100 WBC           I ordered the above labs and reviewed the results    RADIOLOGY  No orders to display       PROGRESS AND CONSULTS  ED Course as of Jan 26 1600   Sun Jan 26, 2020   1428 Old records reviewed.  Patient was admitted here in September 2019 for acute cholecystitis.  He underwent laparoscopic cholecystectomy.    []   1455 EKG          EKG time: 1448  Rhythm/Rate: Sinus bradycardia rate 57  P waves and OK: LAE, normal  QRS, axis: LVH, anterior Q waves, LAD  ST and T waves: Minimal ST elevation anteriorly, no ST depression    Interpreted Contemporaneously by me, independently viewed  EKG is not significantly changed compared to prior EKG done 9/6/2019      []   1537 0.94 one month ago   Creatinine(!): 1.31 [WH]   1550 Test results discussed with the patient and his family.  He is resting comfortably.  On reexam, he has very minimal epigastric tenderness but does not have an acute abdomen.  Patient will be discharged after his IV fluids are complete.  He was advised to follow-up with his primary care doctor in the next 1 to 2 days.    []      ED Course User Index  [WH] Lisandro Martínez MD       8194 Pepcid ordered. Zofran ordered. CMP ordered. Lipase ordered. Troponin ordered. ECG ordered.    MEDICAL DECISION MAKING  Results were reviewed/discussed with the patient and they were also made aware of online access. Pt also made aware that some labs, such as cultures, will not be resulted during ER visit and follow up with PMD is necessary.     MDM  Number of Diagnoses or Management Options  Epigastric pain:   Mild renal insufficiency:   Non-intractable vomiting with nausea,  unspecified vomiting type:   Diagnosis management comments: Patient had very mild epigastric tenderness but did not have an acute abdomen.  Creatinine was slightly elevated consistent with mild dehydration.  LFTs and lipase were normal.  Patient has had prior cholecystectomy.  White blood cell count was also mildly elevated.  Patient was afebrile and well-appearing.  CT was not indicated.  Patient was given IV fluids and IV medications.  He has not had any vomiting since yesterday.  He will be discharged with prescription for Zofran and Bentyl.  Strict return precautions were discussed with the patient.       Amount and/or Complexity of Data Reviewed  Clinical lab tests: ordered and reviewed  Tests in the medicine section of CPT®: ordered and reviewed (See physician dictation notes)  Decide to obtain previous medical records or to obtain history from someone other than the patient: yes  Review and summarize past medical records: yes (See physician dictation notes)  Independent visualization of images, tracings, or specimens: yes           DIAGNOSIS  Final diagnoses:   Epigastric pain   Non-intractable vomiting with nausea, unspecified vomiting type   Mild renal insufficiency       DISPOSITION  Discharge    Latest Documented Vital Signs:  As of 4:00 PM  BP- 130/87 HR- 75 Temp- 97.6 °F (36.4 °C) (Tympanic) O2 sat- 97%    --  Documentation assistance provided by noé Villavicencio for Dr. Lisandro Martínez.  Information recorded by the scribe was done at my direction and has been verified and validated by me.     Saud Casanova  01/26/20 9833       Lisandro Martínez MD  01/26/20 6690

## 2020-01-27 DIAGNOSIS — I25.810 CORONARY ARTERY DISEASE INVOLVING CORONARY BYPASS GRAFT OF NATIVE HEART WITHOUT ANGINA PECTORIS: ICD-10-CM

## 2020-01-27 RX ORDER — NITROGLYCERIN 0.4 MG/1
0.4 TABLET SUBLINGUAL
Qty: 20 TABLET | Refills: 2 | Status: SHIPPED | OUTPATIENT
Start: 2020-01-27 | End: 2023-01-12

## 2020-01-30 ENCOUNTER — EPISODE CHANGES (OUTPATIENT)
Dept: CASE MANAGEMENT | Facility: OTHER | Age: 75
End: 2020-01-30

## 2020-02-03 ENCOUNTER — EPISODE CHANGES (OUTPATIENT)
Dept: CASE MANAGEMENT | Facility: OTHER | Age: 75
End: 2020-02-03

## 2020-02-04 ENCOUNTER — EPISODE CHANGES (OUTPATIENT)
Dept: CASE MANAGEMENT | Facility: OTHER | Age: 75
End: 2020-02-04

## 2020-02-05 ENCOUNTER — PATIENT OUTREACH (OUTPATIENT)
Dept: CASE MANAGEMENT | Facility: OTHER | Age: 75
End: 2020-02-05

## 2020-02-05 NOTE — OUTREACH NOTE
"Patient Outreach Note    Patient reports he is still having some episodes of  Epigastric pain. He is not taking his Protonix \"it does not work\" He is taking an OTC liquid for reflux that is helping \"some\". Encouraged patient to f/u with Dr. Tuttle forf/u & possible change in medications for his reflux. We reviewed bland diet, sitting up after meals & eating 6 small meals daily. Health Maintenance gaps reviewed, patient states he has had his pneumonia shot already. Patient has ACP completed and will bring in to be scanned. Explained role of Care Advisor and contact information given to patient.Nurse provided patient education.No other questions or concerns voiced at this time. Patient declined further outreach calls. No needs identified at this time.    Care Coordination Note    Call made to PCP office spoke with Lexii, patient's appointment on 3/18/20 will be changed to an AWV.    Brooke Mac RN  Ambulatory     2/5/2020, 10:57 AM        Brooke Mac RN  Ambulatory     2/5/2020, 10:52 AM      "

## 2020-02-12 ENCOUNTER — EPISODE CHANGES (OUTPATIENT)
Dept: CASE MANAGEMENT | Facility: OTHER | Age: 75
End: 2020-02-12

## 2020-02-21 DIAGNOSIS — M19.90 ARTHRITIS: ICD-10-CM

## 2020-02-21 RX ORDER — HYDROCODONE BITARTRATE AND ACETAMINOPHEN 5; 325 MG/1; MG/1
TABLET ORAL
Qty: 60 TABLET | Refills: 0 | Status: SHIPPED | OUTPATIENT
Start: 2020-02-21 | End: 2020-04-17 | Stop reason: SDUPTHER

## 2020-04-17 ENCOUNTER — TELEMEDICINE (OUTPATIENT)
Dept: FAMILY MEDICINE CLINIC | Facility: CLINIC | Age: 75
End: 2020-04-17

## 2020-04-17 DIAGNOSIS — E78.5 HYPERLIPIDEMIA, UNSPECIFIED HYPERLIPIDEMIA TYPE: Primary | ICD-10-CM

## 2020-04-17 DIAGNOSIS — M19.90 ARTHRITIS: ICD-10-CM

## 2020-04-17 DIAGNOSIS — R39.14 FEELING OF INCOMPLETE BLADDER EMPTYING: ICD-10-CM

## 2020-04-17 DIAGNOSIS — I73.9 PAD (PERIPHERAL ARTERY DISEASE) (HCC): ICD-10-CM

## 2020-04-17 DIAGNOSIS — M51.37 DDD (DEGENERATIVE DISC DISEASE), LUMBOSACRAL: ICD-10-CM

## 2020-04-17 DIAGNOSIS — M54.50 CHRONIC BILATERAL LOW BACK PAIN WITHOUT SCIATICA: ICD-10-CM

## 2020-04-17 DIAGNOSIS — G89.29 CHRONIC BILATERAL LOW BACK PAIN WITHOUT SCIATICA: ICD-10-CM

## 2020-04-17 PROCEDURE — 99212 OFFICE O/P EST SF 10 MIN: CPT | Performed by: FAMILY MEDICINE

## 2020-04-17 RX ORDER — ROSUVASTATIN CALCIUM 10 MG/1
TABLET, COATED ORAL
Qty: 90 TABLET | Refills: 0 | OUTPATIENT
Start: 2020-04-17

## 2020-04-17 RX ORDER — HYDROCODONE BITARTRATE AND ACETAMINOPHEN 5; 325 MG/1; MG/1
TABLET ORAL
Qty: 60 TABLET | Refills: 0 | Status: SHIPPED | OUTPATIENT
Start: 2020-04-17 | End: 2020-05-12 | Stop reason: SDUPTHER

## 2020-04-18 NOTE — PROGRESS NOTES
Subjective   Chief Complaint:   Chief Complaint   Patient presents with   • Arthritis       History of Present Illness Patient presents for a Face-Time video visit. He has a history of arthritis and DDD and is complaining of joint pain of multiple sights including low back and hips. He takes Norco 5-325mg which he says works well, no side effects, so I refilled that today. He has been on this for years, it is not a new drug. I also ordered a CMP profile with a PSA to be done in three months.    LOS: 73606      Past Medical History:   Diagnosis Date   • Anxiety    • Arthritis    • Atrial flutter (CMS/HCC)     Status post cavotricuspid isthmus ablation by Dr. Gustafson on 4/18/17   • Mandel esophagus    • Benign essential hypertension    • CAD (coronary artery disease)     3 vessel CABG 4/11/17 by Dr. Cortez: ROSARIO-prox LAD, SVG-OM1, SVG-OM3   • Carotid artery disease (CMS/HCC)     Status post carotid endarterectomy - USG 4/10/17: 50-59% NICHELLE, 1-15% LICA.    • Colonic polyp    • DDD (degenerative disc disease), lumbosacral    • GERD (gastroesophageal reflux disease)    • H/O bone density study 2013   • H/O complete eye exam 2014   • HLD (hyperlipidemia)    • Hypertension    • Lipid screening 05/31/2013   • Low back pain     physical therapy [facility] rehab 5-12-10   • Screening for prostate cancer 07/07/2015   • Stroke (CMS/HCC)         Madhu Santoro 74 y.o. male who presents today for Medical Management of the below listed issues and medication refills.    ICD-10-CM ICD-9-CM   1. Arthritis M19.90 716.90   2. DDD (degenerative disc disease), lumbosacral M51.37 722.52   3. Chronic bilateral low back pain without sciatica M54.5 724.2    G89.29 338.29        he has a problem list of   Patient Active Problem List   Diagnosis   • Anxiety   • Arthritis   • Chronic coronary artery disease   • HLD (hyperlipidemia)   • Benign essential hypertension   • DDD (degenerative disc disease), lumbosacral   • Cerebrovascular accident  (CMS/[facility])   • Encounter for screening for cardiovascular disorders   • Gastroesophageal reflux disease   • Hyperlipidemia   • Stenosis of carotid artery   • Former smoker   • History of cardiac catheterization   • S/P ablation of atrial flutter   • Typical atrial flutter (CMS/[facility])   • S/P CABG (coronary artery bypass graft)   • Adenomatous polyp of ascending colon   • Abdominal bloating   • Stroke (CMS/[facility])   • PAD (peripheral artery disease) (CMS/[facility])   • Acute cholecystitis   • Right upper quadrant abdominal pain   .    he has been compliant with   Current Outpatient Medications:   •  albuterol sulfate  (90 Base) MCG/ACT inhaler, Inhale 2 puffs Every 4 (Four) Hours As Needed for Wheezing., Disp: 1 inhaler, Rfl: 0  •  clopidogrel (PLAVIX) 75 MG tablet, Take 1 tablet by mouth Daily., Disp: 90 tablet, Rfl: 1  •  dicyclomine (BENTYL) 20 MG tablet, Take 1 tablet by mouth Every 6 (Six) Hours As Needed (abdominal cramps)., Disp: 20 tablet, Rfl: 0  •  fluorouracil (EFUDEX) 5 % cream, , Disp: , Rfl:   •  HYDROcodone-acetaminophen (NORCO) 5-325 MG per tablet, Take one tablet by mouth twice a day PRN pain, Disp: 60 tablet, Rfl: 0  •  nitroglycerin (NITROSTAT) 0.4 MG SL tablet, Place 1 tablet under the tongue Every 5 (Five) Minutes As Needed for Chest Pain. Take no more than 3 doses in 15 minutes., Disp: 20 tablet, Rfl: 2  •  ondansetron ODT (ZOFRAN-ODT) 4 MG disintegrating tablet, Take 1 tablet by mouth Every 6 (Six) Hours As Needed for Nausea or Vomiting., Disp: 12 tablet, Rfl: 0  •  pantoprazole (PROTONIX) 40 MG EC tablet, Take 1 tablet by mouth Daily., Disp: 90 tablet, Rfl: 1  •  ramipril (ALTACE) 5 MG capsule, Take 1 capsule by mouth Daily., Disp: 90 capsule, Rfl: 1  •  rosuvastatin (CRESTOR) 10 MG tablet, Take 1 tablet by mouth Daily., Disp: 90 tablet, Rfl: 1.  he denies medication side effects.        There were no vitals taken for this visit.    Results for orders placed or performed during the hospital  encounter of 01/26/20   Comprehensive Metabolic Panel   Result Value Ref Range    Glucose 106 (H) 65 - 99 mg/dL    BUN 24 (H) 8 - 23 mg/dL    Creatinine 1.31 (H) 0.76 - 1.27 mg/dL    Sodium 140 136 - 145 mmol/L    Potassium 4.6 3.5 - 5.2 mmol/L    Chloride 99 98 - 107 mmol/L    CO2 28.3 22.0 - 29.0 mmol/L    Calcium 9.6 8.6 - 10.5 mg/dL    Total Protein 7.2 6.0 - 8.5 g/dL    Albumin 3.90 3.50 - 5.20 g/dL    ALT (SGPT) 32 1 - 41 U/L    AST (SGOT) 33 1 - 40 U/L    Alkaline Phosphatase 107 39 - 117 U/L    Total Bilirubin 1.2 0.2 - 1.2 mg/dL    eGFR Non African Amer 53 (L) >60 mL/min/1.73    Globulin 3.3 gm/dL    A/G Ratio 1.2 g/dL    BUN/Creatinine Ratio 18.3 7.0 - 25.0    Anion Gap 12.7 5.0 - 15.0 mmol/L   Lipase   Result Value Ref Range    Lipase 11 (L) 13 - 60 U/L   Troponin   Result Value Ref Range    Troponin T <0.010 0.000 - 0.030 ng/mL   CBC Auto Differential   Result Value Ref Range    WBC 14.33 (H) 3.40 - 10.80 10*3/mm3    RBC 5.70 4.14 - 5.80 10*6/mm3    Hemoglobin 17.0 13.0 - 17.7 g/dL    Hematocrit 52.3 (H) 37.5 - 51.0 %    MCV 91.8 79.0 - 97.0 fL    MCH 29.8 26.6 - 33.0 pg    MCHC 32.5 31.5 - 35.7 g/dL    RDW 13.1 12.3 - 15.4 %    RDW-SD 44.6 37.0 - 54.0 fl    MPV 9.8 6.0 - 12.0 fL    Platelets 383 140 - 450 10*3/mm3    Neutrophil % 78.4 (H) 42.7 - 76.0 %    Lymphocyte % 11.4 (L) 19.6 - 45.3 %    Monocyte % 8.6 5.0 - 12.0 %    Eosinophil % 0.2 (L) 0.3 - 6.2 %    Basophil % 0.3 0.0 - 1.5 %    Immature Grans % 1.1 (H) 0.0 - 0.5 %    Neutrophils, Absolute 11.23 (H) 1.70 - 7.00 10*3/mm3    Lymphocytes, Absolute 1.64 0.70 - 3.10 10*3/mm3    Monocytes, Absolute 1.23 (H) 0.10 - 0.90 10*3/mm3    Eosinophils, Absolute 0.03 0.00 - 0.40 10*3/mm3    Basophils, Absolute 0.04 0.00 - 0.20 10*3/mm3    Immature Grans, Absolute 0.16 (H) 0.00 - 0.05 10*3/mm3    nRBC 0.0 0.0 - 0.2 /100 WBC       The following portions of the patient's history were reviewed and updated as appropriate: allergies, current medications, past  family history, past medical history, past social history, past surgical history and problem list.      he has a history of   Patient Active Problem List   Diagnosis   • Anxiety   • Arthritis   • Chronic coronary artery disease   • HLD (hyperlipidemia)   • Benign essential hypertension   • DDD (degenerative disc disease), lumbosacral   • Cerebrovascular accident (CMS/HCC)   • Encounter for screening for cardiovascular disorders   • Gastroesophageal reflux disease   • Hyperlipidemia   • Stenosis of carotid artery   • Former smoker   • History of cardiac catheterization   • S/P ablation of atrial flutter   • Typical atrial flutter (CMS/HCC)   • S/P CABG (coronary artery bypass graft)   • Adenomatous polyp of ascending colon   • Abdominal bloating   • Stroke (CMS/HCC)   • PAD (peripheral artery disease) (CMS/[facility])   • Acute cholecystitis   • Right upper quadrant abdominal pain       Review of Systems   Constitutional: Negative for fever.   Respiratory: Negative for cough and shortness of breath.    Cardiovascular: Negative for chest pain.   Gastrointestinal: Negative for abdominal pain.   Musculoskeletal: Positive for arthralgias and back pain.   Skin: Negative for rash.   Psychiatric/Behavioral: Negative for confusion.       Objective   Physical Exam   Constitutional: He is oriented to person, place, and time. He appears well-developed and well-nourished. No distress.   HENT:   Head: Normocephalic.   Pulmonary/Chest: Effort normal. No respiratory distress.   Musculoskeletal:   Limited ROM secondary to back pain. Moves slowly.   Neurological: He is alert and oriented to person, place, and time.   Psychiatric: He has a normal mood and affect. His behavior is normal. Judgment and thought content normal.   Nursing note reviewed.      Assessment/Plan   Madhu was seen today for arthritis.    Diagnoses and all orders for this visit:    Arthritis  -     HYDROcodone-acetaminophen (NORCO) 5-325 MG per tablet; Take one tablet by  mouth twice a day PRN pain    DDD (degenerative disc disease), lumbosacral    Chronic bilateral low back pain without sciatica      Labs results discussed in detail with the patient, if no recent labs were done, order placed today.  Plan to update vaccines if needed today.  I  reviewed health maintenance with the patient as part of my preventative care plan. I discussed preventative counseling with the patient in detail.

## 2020-05-06 RX ORDER — ROSUVASTATIN CALCIUM 10 MG/1
TABLET, COATED ORAL
Qty: 30 TABLET | Refills: 4 | OUTPATIENT
Start: 2020-05-06

## 2020-05-08 RX ORDER — ROSUVASTATIN CALCIUM 10 MG/1
TABLET, COATED ORAL
Qty: 90 TABLET | Refills: 0 | OUTPATIENT
Start: 2020-05-08

## 2020-05-12 DIAGNOSIS — I63.9 CEREBROVASCULAR ACCIDENT (CVA), UNSPECIFIED MECHANISM (HCC): ICD-10-CM

## 2020-05-12 DIAGNOSIS — I10 BENIGN ESSENTIAL HYPERTENSION: ICD-10-CM

## 2020-05-12 DIAGNOSIS — M19.90 ARTHRITIS: ICD-10-CM

## 2020-05-12 DIAGNOSIS — I25.83 CORONARY ARTERY DISEASE DUE TO LIPID RICH PLAQUE: ICD-10-CM

## 2020-05-12 DIAGNOSIS — I25.10 CORONARY ARTERY DISEASE DUE TO LIPID RICH PLAQUE: ICD-10-CM

## 2020-05-12 RX ORDER — ROSUVASTATIN CALCIUM 10 MG/1
TABLET, COATED ORAL
Qty: 90 TABLET | Refills: 0 | OUTPATIENT
Start: 2020-05-12

## 2020-05-12 RX ORDER — ROSUVASTATIN CALCIUM 10 MG/1
10 TABLET, COATED ORAL DAILY
Qty: 90 TABLET | Refills: 1 | Status: SHIPPED | OUTPATIENT
Start: 2020-05-12 | End: 2020-11-03 | Stop reason: SDUPTHER

## 2020-05-12 RX ORDER — PANTOPRAZOLE SODIUM 40 MG/1
40 TABLET, DELAYED RELEASE ORAL DAILY
Qty: 90 TABLET | Refills: 1 | Status: SHIPPED | OUTPATIENT
Start: 2020-05-12 | End: 2020-11-03 | Stop reason: SDUPTHER

## 2020-05-12 RX ORDER — RAMIPRIL 5 MG/1
5 CAPSULE ORAL DAILY
Qty: 90 CAPSULE | Refills: 1 | Status: SHIPPED | OUTPATIENT
Start: 2020-05-12 | End: 2020-11-03 | Stop reason: SDUPTHER

## 2020-05-12 RX ORDER — CLOPIDOGREL BISULFATE 75 MG/1
75 TABLET ORAL DAILY
Qty: 90 TABLET | Refills: 1 | Status: SHIPPED | OUTPATIENT
Start: 2020-05-12 | End: 2020-11-03 | Stop reason: SDUPTHER

## 2020-05-14 RX ORDER — HYDROCODONE BITARTRATE AND ACETAMINOPHEN 5; 325 MG/1; MG/1
TABLET ORAL
Qty: 60 TABLET | Refills: 0 | Status: SHIPPED | OUTPATIENT
Start: 2020-05-14 | End: 2020-06-10 | Stop reason: SDUPTHER

## 2020-05-18 ENCOUNTER — TELEMEDICINE (OUTPATIENT)
Dept: CARDIOLOGY | Facility: CLINIC | Age: 75
End: 2020-05-18

## 2020-05-18 VITALS
HEIGHT: 68 IN | DIASTOLIC BLOOD PRESSURE: 72 MMHG | WEIGHT: 180 LBS | SYSTOLIC BLOOD PRESSURE: 135 MMHG | BODY MASS INDEX: 27.28 KG/M2

## 2020-05-18 DIAGNOSIS — I77.9 CAROTID ARTERY DISEASE, UNSPECIFIED LATERALITY, UNSPECIFIED TYPE (HCC): ICD-10-CM

## 2020-05-18 DIAGNOSIS — I49.5 TACHY-BRADY SYNDROME (HCC): Primary | ICD-10-CM

## 2020-05-18 DIAGNOSIS — I25.810 CORONARY ARTERY DISEASE INVOLVING CORONARY BYPASS GRAFT OF NATIVE HEART WITHOUT ANGINA PECTORIS: ICD-10-CM

## 2020-05-18 DIAGNOSIS — Z86.79 STATUS POST ABLATION OF ATRIAL FLUTTER: ICD-10-CM

## 2020-05-18 DIAGNOSIS — Z98.890 STATUS POST ABLATION OF ATRIAL FLUTTER: ICD-10-CM

## 2020-05-18 PROCEDURE — 99213 OFFICE O/P EST LOW 20 MIN: CPT | Performed by: NURSE PRACTITIONER

## 2020-05-18 NOTE — PROGRESS NOTES
"    [facility] CARDIOLOGY  [address]  [facility] [address]  Phone: [phone]      Patient Name: Madhu Santoro  :1945  Age: 74 y.o.  Primary Cardiologist: Jann Wright MD  Encounter Provider:  MY Tapia      Chief Complaint:   Chief Complaint   Patient presents with   • Follow-up     video         HPI  Madhu Santoro is a 74 y.o. male who presents today via video visit secondary to COVID pandemic. Patient presents today for semiannual evaluation.     Pt has a  history significant for CAD with history of CABG, atrial flutter, hypertension, carotid artery disease, hyperlipidemia, history of stroke.    Patient reports that he has done well over the past 6 months.  He reports that he has had been noting a low HR in the 40s-50s.  He states that when he exerts himself his HR elevates to the 150s-160s.  He denies any lightheadedness or dizziness with lower readings. He reports that after exertion the HR comes down quickly with rest.  His BP has been in the 130s.  He denies chest pain, shortness of breath, palpitations, increased fatigue.  He stays active working in his yard.  He denies exertional symptoms.      The following portions of the patient's history were reviewed and updated as appropriate: allergies, current medications, past family history, past medical history, past social history, past surgical history and problem list.      Review of Systems   Constitution: Negative for malaise/fatigue.   Cardiovascular: Negative for chest pain and leg swelling.   Respiratory: Negative for shortness of breath.    Neurological: Negative for light-headedness.   All other systems reviewed and are negative.      OBJECTIVE:     Vitals:    20 1138   BP: 135/72   Weight: 81.6 kg (180 lb)   Height: 172.7 cm (68\")     Body mass index is 27.37 kg/m².    Physical Exam   Constitutional: He is oriented to person, place, and time. He appears well-developed. "   HENT:   Head: Normocephalic and atraumatic.   Eyes: Conjunctivae are normal.   Pulmonary/Chest: No respiratory distress.   Neurological: He is alert and oriented to person, place, and time. GCS eye subscore is 4. GCS verbal subscore is 5. GCS motor subscore is 6.   Psychiatric: He has a normal mood and affect. His speech is normal and behavior is normal. Judgment and thought content normal. Cognition and memory are normal.         Procedures    Cardiac Procedures:  1. Echocardiogram on 4/8/2017: The ejection fraction was 55-60%.  The left atrium was mildly to moderately dilated.  There was mild mitral regurgitation.  2. Left heart catheterization on 4/10/2017: There was a distal 80% and severely calcified stenosis in the left main extending into the ostium of left circumflex.  The left circumflex was dominant, and did have the before mentioned 80% lesion in the ostial aspect extending from the left main.  The LAD had 20% proximal disease.  The right coronary artery was small, nondominant, and normal.  3. Status post 3 vessel coronary artery bypass grafting on 4/11/2017 by Dr. Cortez: ROSARIO to proximal LAD, SVG to OM 1, and SVG to OM 3.  4. Transesophageal echo on 4/17/2017: The ejection fraction was 55%.  There was moderate left atrial enlargement and mild to moderate right atrial enlargement.  The atrial septum was redundant, but no PFO was seen on the saline study.  There was no significant valvular disease.  5. Status post cavotricuspid isthmus ablation for typical atrial flutter on 4/18/2017 by Dr. Gustafson.  6. Carotid Doppler ultrasound on 4/10/2017: There was 50-59% stenosis in the right internal carotid artery.  There was 1-15% disease in the left internal carotid artery.  7. Echocardiogram on 10/24/2017: Ejection fraction was 56%.  There was grade 2 diastolic dysfunction.  There was aortic sclerosis without stenosis.    ASSESSMENT:      Diagnosis Plan   1. Tachy-thuan syndrome (CMS/HCC)  Holter Monitor -  24 Hour   2. Coronary artery disease involving coronary bypass graft of native heart without angina pectoris     3. Status post ablation of atrial flutter     4. Carotid artery disease, unspecified laterality, unspecified type (CMS/[facility])           PLAN OF CARE:     Patient is done well over the past 6 months.  He has noted that his heart rate has been in the 40s-50s at times but then with very minimal exertion was elevated into the 150s-160s.  He denies any lightheadedness, dizziness, weakness with bradycardia episodes and notes that tachycardic spells resolve quickly after rest.  Patient is on verapamil but no other heart rate lowering medications.  I recommended that we place a 24-hour Holter so we can reassess his heart rhythm and rate.    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  He will continue with Plavix which he takes for PVD.  Continue verapamil.  Blood pressures have been controlled.  Patient will follow-up with Dr. Wright in 6 months.  Sooner for problems or complications.    This patient has consented to a telehealth visit via video. I spent  13 minutes in total including but not limited to the direct conversation with this patient. All vitals recorded within this visit are reported by the patient.         Discharge Medications           Accurate as of May 18, 2020 11:56 AM. If you have any questions, ask your nurse or doctor.               Continue These Medications      Instructions Start Date   albuterol sulfate  (90 Base) MCG/ACT inhaler  Commonly known as:  PROVENTIL HFA;VENTOLIN HFA;PROAIR HFA   2 puffs, Inhalation, Every 4 Hours PRN      clopidogrel 75 MG tablet  Commonly known as:  PLAVIX   75 mg, Oral, Daily      dicyclomine 20 MG tablet  Commonly known as:  BENTYL   20 mg, Oral, Every 6 Hours PRN      fluorouracil 5 % cream  Commonly known as:  EFUDEX   No dose, route, or frequency recorded.      HYDROcodone-acetaminophen 5-325 MG per  tablet  Commonly known as:  NORCO   Take one tablet by mouth twice a day PRN pain      nitroglycerin 0.4 MG SL tablet  Commonly known as:  NITROSTAT   0.4 mg, Sublingual, Every 5 Minutes PRN, Take no more than 3 doses in 15 minutes.       ondansetron ODT 4 MG disintegrating tablet  Commonly known as:  ZOFRAN-ODT   4 mg, Oral, Every 6 Hours PRN      pantoprazole 40 MG EC tablet  Commonly known as:  PROTONIX   40 mg, Oral, Daily      ramipril 5 MG capsule  Commonly known as:  ALTACE   5 mg, Oral, Daily      rosuvastatin 10 MG tablet  Commonly known as:  CRESTOR   10 mg, Oral, Daily             Thank you for allowing me to participate in the care of your patient,         MY Tapia  [facility]  05/18/20  11:41 AM      **Dane Disclaimer:**  Much of this encounter note is an electronic transcription/translation of spoken language to printed text. The electronic translation of spoken language may permit erroneous, or at times, nonsensical words or phrases to be inadvertently transcribed. Although I have reviewed the note for such errors, some may still exist.

## 2020-06-10 DIAGNOSIS — M19.90 ARTHRITIS: ICD-10-CM

## 2020-06-11 RX ORDER — HYDROCODONE BITARTRATE AND ACETAMINOPHEN 5; 325 MG/1; MG/1
TABLET ORAL
Qty: 60 TABLET | Refills: 0 | Status: SHIPPED | OUTPATIENT
Start: 2020-06-11 | End: 2020-07-20 | Stop reason: SDUPTHER

## 2020-06-15 ENCOUNTER — TELEPHONE (OUTPATIENT)
Dept: FAMILY MEDICINE CLINIC | Facility: CLINIC | Age: 75
End: 2020-06-15

## 2020-06-18 DIAGNOSIS — E78.5 HYPERLIPIDEMIA, UNSPECIFIED HYPERLIPIDEMIA TYPE: ICD-10-CM

## 2020-06-18 DIAGNOSIS — R39.14 FEELING OF INCOMPLETE BLADDER EMPTYING: ICD-10-CM

## 2020-07-13 LAB
ALBUMIN SERPL-MCNC: 4.1 G/DL (ref 3.5–5.2)
ALBUMIN/GLOB SERPL: 1.6 G/DL
ALP SERPL-CCNC: 98 U/L (ref 39–117)
ALT SERPL-CCNC: 14 U/L (ref 1–41)
AST SERPL-CCNC: 20 U/L (ref 1–40)
BASOPHILS # BLD AUTO: 0.03 10*3/MM3 (ref 0–0.2)
BASOPHILS NFR BLD AUTO: 0.5 % (ref 0–1.5)
BILIRUB SERPL-MCNC: 1 MG/DL (ref 0–1.2)
BUN SERPL-MCNC: 14 MG/DL (ref 8–23)
BUN/CREAT SERPL: 14.3 (ref 7–25)
CALCIUM SERPL-MCNC: 9.5 MG/DL (ref 8.6–10.5)
CHLORIDE SERPL-SCNC: 101 MMOL/L (ref 98–107)
CHOLEST SERPL-MCNC: 135 MG/DL (ref 0–200)
CO2 SERPL-SCNC: 28 MMOL/L (ref 22–29)
CREAT SERPL-MCNC: 0.98 MG/DL (ref 0.76–1.27)
EOSINOPHIL # BLD AUTO: 0.07 10*3/MM3 (ref 0–0.4)
EOSINOPHIL NFR BLD AUTO: 1.1 % (ref 0.3–6.2)
ERYTHROCYTE [DISTWIDTH] IN BLOOD BY AUTOMATED COUNT: 13.5 % (ref 12.3–15.4)
GLOBULIN SER CALC-MCNC: 2.6 GM/DL
GLUCOSE SERPL-MCNC: 97 MG/DL (ref 65–99)
HCT VFR BLD AUTO: 51.8 % (ref 37.5–51)
HDLC SERPL-MCNC: 51 MG/DL (ref 40–60)
HGB BLD-MCNC: 16.8 G/DL (ref 13–17.7)
IMM GRANULOCYTES # BLD AUTO: 0.1 10*3/MM3 (ref 0–0.05)
IMM GRANULOCYTES NFR BLD AUTO: 1.6 % (ref 0–0.5)
LDLC SERPL CALC-MCNC: 71 MG/DL (ref 0–100)
LYMPHOCYTES # BLD AUTO: 1.29 10*3/MM3 (ref 0.7–3.1)
LYMPHOCYTES NFR BLD AUTO: 20.8 % (ref 19.6–45.3)
MCH RBC QN AUTO: 27.5 PG (ref 26.6–33)
MCHC RBC AUTO-ENTMCNC: 32.4 G/DL (ref 31.5–35.7)
MCV RBC AUTO: 84.9 FL (ref 79–97)
MONOCYTES # BLD AUTO: 0.87 10*3/MM3 (ref 0.1–0.9)
MONOCYTES NFR BLD AUTO: 14 % (ref 5–12)
NEUTROPHILS # BLD AUTO: 3.84 10*3/MM3 (ref 1.7–7)
NEUTROPHILS NFR BLD AUTO: 62 % (ref 42.7–76)
NRBC BLD AUTO-RTO: 0 /100 WBC (ref 0–0.2)
PLATELET # BLD AUTO: 385 10*3/MM3 (ref 140–450)
POTASSIUM SERPL-SCNC: 5.7 MMOL/L (ref 3.5–5.2)
PROT SERPL-MCNC: 6.7 G/DL (ref 6–8.5)
PSA SERPL-MCNC: 0.89 NG/ML (ref 0–4)
RBC # BLD AUTO: 6.1 10*6/MM3 (ref 4.14–5.8)
SODIUM SERPL-SCNC: 137 MMOL/L (ref 136–145)
TRIGL SERPL-MCNC: 64 MG/DL (ref 0–150)
TSH SERPL DL<=0.005 MIU/L-ACNC: 2.59 UIU/ML (ref 0.27–4.2)
VLDLC SERPL CALC-MCNC: 12.8 MG/DL
WBC # BLD AUTO: 6.2 10*3/MM3 (ref 3.4–10.8)

## 2020-07-20 ENCOUNTER — OFFICE VISIT (OUTPATIENT)
Dept: FAMILY MEDICINE CLINIC | Facility: CLINIC | Age: 75
End: 2020-07-20

## 2020-07-20 VITALS
TEMPERATURE: 97.1 F | HEIGHT: 68 IN | HEART RATE: 62 BPM | SYSTOLIC BLOOD PRESSURE: 160 MMHG | DIASTOLIC BLOOD PRESSURE: 72 MMHG | WEIGHT: 173 LBS | BODY MASS INDEX: 26.22 KG/M2

## 2020-07-20 DIAGNOSIS — M19.90 ARTHRITIS: ICD-10-CM

## 2020-07-20 DIAGNOSIS — E78.5 HYPERLIPIDEMIA, UNSPECIFIED HYPERLIPIDEMIA TYPE: ICD-10-CM

## 2020-07-20 DIAGNOSIS — I10 BENIGN ESSENTIAL HYPERTENSION: Primary | ICD-10-CM

## 2020-07-20 PROCEDURE — 99214 OFFICE O/P EST MOD 30 MIN: CPT | Performed by: FAMILY MEDICINE

## 2020-07-20 RX ORDER — HYDROCODONE BITARTRATE AND ACETAMINOPHEN 5; 325 MG/1; MG/1
TABLET ORAL
Qty: 60 TABLET | Refills: 0 | Status: SHIPPED | OUTPATIENT
Start: 2020-07-20 | End: 2020-08-20 | Stop reason: SDUPTHER

## 2020-07-20 NOTE — PROGRESS NOTES
Subjective   Chief Complaint:   Chief Complaint   Patient presents with   • Hyperlipidemia   • Arthritis         History of Present Illness to refill his medication namely today when a refill his hydrocodone 5 325 mg 1 twice daily as needed back pain he gets 60 with 2 refills.  So I refilled 60 today take 1 twice a day.  And then he is going to call me for the next 2 refills.  He is on Plavix for a TIA still on the Plavix and has been on that for about 5 years now.  He IS  On   Plavix he is on Altace and Protonix and Crestor nitroglycerin as needed he had labs this visit.  And again he needs refills of hydrocodone only today which is Norco 5-3 25.  Pain is still present but it is better with the Norco 5-3 25 and he takes 1 p.o. twice daily as needed pain  PSA is normal at 0.89 to do a prostate exam today I am.  I reviewed all of his other medicines in detail with him his potassium was 5.7 so I need to repeat that  Today.   bp  150/80   At home he gets 130/70      Past Medical History:   Diagnosis Date   • Anxiety    • Arthritis    • Atrial flutter (CMS/HCC)     Status post cavotricuspid isthmus ablation by Dr. Gustafson on 4/18/17   • Mandel esophagus    • Benign essential hypertension    • CAD (coronary artery disease)     3 vessel CABG 4/11/17 by Dr. Cortez: ROSARIO-prox LAD, SVG-OM1, SVG-OM3   • Carotid artery disease (CMS/HCC)     Status post carotid endarterectomy - USG 4/10/17: 50-59% NICHELLE, 1-15% LICA.    • Colonic polyp    • DDD (degenerative disc disease), lumbosacral    • GERD (gastroesophageal reflux disease)    • H/O bone density study 2013   • H/O complete eye exam 2014   • HLD (hyperlipidemia)    • Hypertension    • Lipid screening 05/31/2013   • Low back pain     physical therapy [facility] rehab 5-12-10   • Screening for prostate cancer 07/07/2015   • Stroke (CMS/HCC)         Madhu Santoro 74 y.o. male who presents today for Medical Management of the below listed issues and medication refills. The  patient has read and signed the [facility] Controlled Substance Contract.  I will continue to see patient for regular follow up appointments.  They are well controlled on their medication.  RUTH is updated every 3 months. The patient is aware of the potential for addiction and dependence.      ICD-10-CM ICD-9-CM   1. Benign essential hypertension I10 401.1   2. Arthritis M19.90 716.90   3. Hyperlipidemia, unspecified hyperlipidemia type E78.5 272.4        he has a problem list of   Patient Active Problem List   Diagnosis   • Anxiety   • Arthritis   • Chronic coronary artery disease   • HLD (hyperlipidemia)   • Benign essential hypertension   • DDD (degenerative disc disease), lumbosacral   • Cerebrovascular accident (CMS/HCC)   • Encounter for screening for cardiovascular disorders   • Gastroesophageal reflux disease   • Hyperlipidemia   • Stenosis of carotid artery   • Former smoker   • History of cardiac catheterization   • S/P ablation of atrial flutter   • Typical atrial flutter (CMS/HCC)   • S/P CABG (coronary artery bypass graft)   • Adenomatous polyp of ascending colon   • Abdominal bloating   • Stroke (CMS/HCC)   • PAD (peripheral artery disease) (CMS/HCC)   • Acute cholecystitis   • Right upper quadrant abdominal pain   .    he has been compliant with   Current Outpatient Medications:   •  albuterol sulfate  (90 Base) MCG/ACT inhaler, Inhale 2 puffs Every 4 (Four) Hours As Needed for Wheezing., Disp: 1 inhaler, Rfl: 0  •  clopidogrel (PLAVIX) 75 MG tablet, Take 1 tablet by mouth Daily., Disp: 90 tablet, Rfl: 1  •  dicyclomine (BENTYL) 20 MG tablet, Take 1 tablet by mouth Every 6 (Six) Hours As Needed (abdominal cramps)., Disp: 20 tablet, Rfl: 0  •  fluorouracil (EFUDEX) 5 % cream, , Disp: , Rfl:   •  HYDROcodone-acetaminophen (NORCO) 5-325 MG per tablet, Take one tablet by mouth twice a day PRN pain, Disp: 60 tablet, Rfl: 0  •  nitroglycerin (NITROSTAT) 0.4 MG SL tablet, Place 1 tablet under  "the tongue Every 5 (Five) Minutes As Needed for Chest Pain. Take no more than 3 doses in 15 minutes., Disp: 20 tablet, Rfl: 2  •  ondansetron ODT (ZOFRAN-ODT) 4 MG disintegrating tablet, Take 1 tablet by mouth Every 6 (Six) Hours As Needed for Nausea or Vomiting., Disp: 12 tablet, Rfl: 0  •  pantoprazole (PROTONIX) 40 MG EC tablet, Take 1 tablet by mouth Daily., Disp: 90 tablet, Rfl: 1  •  ramipril (ALTACE) 5 MG capsule, Take 1 capsule by mouth Daily., Disp: 90 capsule, Rfl: 1  •  rosuvastatin (CRESTOR) 10 MG tablet, Take 1 tablet by mouth Daily., Disp: 90 tablet, Rfl: 1.  he denies medication side effects.        /72   Pulse 62   Temp 97.1 °F (36.2 °C)   Ht 172.7 cm (67.99\")   Wt 78.5 kg (173 lb)   BMI 26.31 kg/m²     Results for orders placed or performed in visit on 06/18/20   PSA   Result Value Ref Range    PSA 0.892 0.000 - 4.000 ng/mL   TSH   Result Value Ref Range    TSH 2.590 0.270 - 4.200 uIU/mL   Lipid panel   Result Value Ref Range    Total Cholesterol 135 0 - 200 mg/dL    Triglycerides 64 0 - 150 mg/dL    HDL Cholesterol 51 40 - 60 mg/dL    VLDL Cholesterol 12.8 mg/dL    LDL Cholesterol  71 0 - 100 mg/dL   Comprehensive metabolic panel   Result Value Ref Range    Glucose 97 65 - 99 mg/dL    BUN 14 8 - 23 mg/dL    Creatinine 0.98 0.76 - 1.27 mg/dL    eGFR Non African Am 75 >60 mL/min/1.73    eGFR African Am 90 >60 mL/min/1.73    BUN/Creatinine Ratio 14.3 7.0 - 25.0    Sodium 137 136 - 145 mmol/L    Potassium 5.7 (H) 3.5 - 5.2 mmol/L    Chloride 101 98 - 107 mmol/L    Total CO2 28.0 22.0 - 29.0 mmol/L    Calcium 9.5 8.6 - 10.5 mg/dL    Total Protein 6.7 6.0 - 8.5 g/dL    Albumin 4.10 3.50 - 5.20 g/dL    Globulin 2.6 gm/dL    A/G Ratio 1.6 g/dL    Total Bilirubin 1.0 0.0 - 1.2 mg/dL    Alkaline Phosphatase 98 39 - 117 U/L    AST (SGOT) 20 1 - 40 U/L    ALT (SGPT) 14 1 - 41 U/L   CBC and Differential   Result Value Ref Range    WBC 6.20 3.40 - 10.80 10*3/mm3    RBC 6.10 (H) 4.14 - 5.80 10*6/mm3    " Hemoglobin 16.8 13.0 - 17.7 g/dL    Hematocrit 51.8 (H) 37.5 - 51.0 %    MCV 84.9 79.0 - 97.0 fL    MCH 27.5 26.6 - 33.0 pg    MCHC 32.4 31.5 - 35.7 g/dL    RDW 13.5 12.3 - 15.4 %    Platelets 385 140 - 450 10*3/mm3    Neutrophil Rel % 62.0 42.7 - 76.0 %    Lymphocyte Rel % 20.8 19.6 - 45.3 %    Monocyte Rel % 14.0 (H) 5.0 - 12.0 %    Eosinophil Rel % 1.1 0.3 - 6.2 %    Basophil Rel % 0.5 0.0 - 1.5 %    Neutrophils Absolute 3.84 1.70 - 7.00 10*3/mm3    Lymphocytes Absolute 1.29 0.70 - 3.10 10*3/mm3    Monocytes Absolute 0.87 0.10 - 0.90 10*3/mm3    Eosinophils Absolute 0.07 0.00 - 0.40 10*3/mm3    Basophils Absolute 0.03 0.00 - 0.20 10*3/mm3    Immature Granulocyte Rel % 1.6 (H) 0.0 - 0.5 %    Immature Grans Absolute 0.10 (H) 0.00 - 0.05 10*3/mm3    nRBC 0.0 0.0 - 0.2 /100 WBC       The following portions of the patient's history were reviewed and updated as appropriate: allergies, current medications, past family history, past medical history, past social history, past surgical history and problem list.      he has a history of   Patient Active Problem List   Diagnosis   • Anxiety   • Arthritis   • Chronic coronary artery disease   • HLD (hyperlipidemia)   • Benign essential hypertension   • DDD (degenerative disc disease), lumbosacral   • Cerebrovascular accident (CMS/HCC)   • Encounter for screening for cardiovascular disorders   • Gastroesophageal reflux disease   • Hyperlipidemia   • Stenosis of carotid artery   • Former smoker   • History of cardiac catheterization   • S/P ablation of atrial flutter   • Typical atrial flutter (CMS/HCC)   • S/P CABG (coronary artery bypass graft)   • Adenomatous polyp of ascending colon   • Abdominal bloating   • Stroke (CMS/HCC)   • PAD (peripheral artery disease) (CMS/HCC)   • Acute cholecystitis   • Right upper quadrant abdominal pain       Review of Systems   Constitutional: Negative for fatigue and fever.   HENT: Negative for sinus pressure.    Eyes: Negative for visual  disturbance.   Respiratory: Negative for chest tightness and shortness of breath.    Cardiovascular: Negative for chest pain.   Gastrointestinal: Negative for abdominal pain.   Genitourinary: Negative for difficulty urinating and hematuria.   Musculoskeletal: Negative for back pain.   Skin: Negative for rash.   Neurological: Negative for dizziness and headaches.   Psychiatric/Behavioral: Negative for confusion.       Objective   Physical Exam   Constitutional: He is oriented to person, place, and time. He appears well-developed and well-nourished.   Eyes: Pupils are equal, round, and reactive to light.   Neck: Normal range of motion.   Cardiovascular: Normal rate and regular rhythm.   Pulmonary/Chest: Effort normal and breath sounds normal.   Abdominal: Soft.   Genitourinary: Prostate normal.   Musculoskeletal: Normal range of motion. He exhibits no edema.   Lymphadenopathy:     He has no cervical adenopathy.   Neurological: He is alert and oriented to person, place, and time.   Skin: Skin is warm and dry.   Psychiatric: He has a normal mood and affect. His behavior is normal.   Nursing note and vitals reviewed.      Assessment/Plan   Madhu was seen today for hyperlipidemia and arthritis.    Diagnoses and all orders for this visit:    Benign essential hypertension  -     Basic Metabolic Panel    Arthritis  -     HYDROcodone-acetaminophen (NORCO) 5-325 MG per tablet; Take one tablet by mouth twice a day PRN pain    Hyperlipidemia, unspecified hyperlipidemia type      Prostate exam       elevated  K

## 2020-07-20 NOTE — PATIENT INSTRUCTIONS
Hypertension, Adult  Hypertension is another name for high blood pressure. High blood pressure forces your heart to work harder to pump blood. This can cause problems over time.  There are two numbers in a blood pressure reading. There is a top number (systolic) over a bottom number (diastolic). It is best to have a blood pressure that is below 120/80. Healthy choices can help lower your blood pressure, or you may need medicine to help lower it.  What are the causes?  The cause of this condition is not known. Some conditions may be related to high blood pressure.  What increases the risk?  · Smoking.  · Having type 2 diabetes mellitus, high cholesterol, or both.  · Not getting enough exercise or physical activity.  · Being overweight.  · Having too much fat, sugar, calories, or salt (sodium) in your diet.  · Drinking too much alcohol.  · Having long-term (chronic) kidney disease.  · Having a family history of high blood pressure.  · Age. Risk increases with age.  · Race. You may be at higher risk if you are .  · Gender. Men are at higher risk than women before age 45. After age 65, women are at higher risk than men.  · Having obstructive sleep apnea.  · Stress.  What are the signs or symptoms?  · High blood pressure may not cause symptoms. Very high blood pressure (hypertensive crisis) may cause:  ? Headache.  ? Feelings of worry or nervousness (anxiety).  ? Shortness of breath.  ? Nosebleed.  ? A feeling of being sick to your stomach (nausea).  ? Throwing up (vomiting).  ? Changes in how you see.  ? Very bad chest pain.  ? Seizures.  How is this treated?  · This condition is treated by making healthy lifestyle changes, such as:  ? Eating healthy foods.  ? Exercising more.  ? Drinking less alcohol.  · Your health care provider may prescribe medicine if lifestyle changes are not enough to get your blood pressure under control, and if:  ? Your top number is above 130.  ? Your bottom number is above  80.  · Your personal target blood pressure may vary.  Follow these instructions at home:  Eating and drinking    · If told, follow the DASH eating plan. To follow this plan:  ? Fill one half of your plate at each meal with fruits and vegetables.  ? Fill one fourth of your plate at each meal with whole grains. Whole grains include whole-wheat pasta, brown rice, and whole-grain bread.  ? Eat or drink low-fat dairy products, such as skim milk or low-fat yogurt.  ? Fill one fourth of your plate at each meal with low-fat (lean) proteins. Low-fat proteins include fish, chicken without skin, eggs, beans, and tofu.  ? Avoid fatty meat, cured and processed meat, or chicken with skin.  ? Avoid pre-made or processed food.  · Eat less than 1,500 mg of salt each day.  · Do not drink alcohol if:  ? Your doctor tells you not to drink.  ? You are pregnant, may be pregnant, or are planning to become pregnant.  · If you drink alcohol:  ? Limit how much you use to:  § 0-1 drink a day for women.  § 0-2 drinks a day for men.  ? Be aware of how much alcohol is in your drink. In the [place] one drink equals one 12 oz bottle of beer (355 mL), one 5 oz glass of wine (148 mL), or one 1½ oz glass of hard liquor (44 mL).  Lifestyle    · Work with your doctor to stay at a healthy weight or to lose weight. Ask your doctor what the best weight is for you.  · Get at least 30 minutes of exercise most days of the week. This may include walking, swimming, or biking.  · Get at least 30 minutes of exercise that strengthens your muscles (resistance exercise) at least 3 days a week. This may include lifting weights or doing Pilates.  · Do not use any products that contain nicotine or tobacco, such as cigarettes, e-cigarettes, and chewing tobacco. If you need help quitting, ask your doctor.  · Check your blood pressure at home as told by your doctor.  · Keep all follow-up visits as told by your doctor. This is important.  Medicines  · Take over-the-counter  and prescription medicines only as told by your doctor. Follow directions carefully.  · Do not skip doses of blood pressure medicine. The medicine does not work as well if you skip doses. Skipping doses also puts you at risk for problems.  · Ask your doctor about side effects or reactions to medicines that you should watch for.  Contact a doctor if you:  · Think you are having a reaction to the medicine you are taking.  · Have headaches that keep coming back (recurring).  · Feel dizzy.  · Have swelling in your ankles.  · Have trouble with your vision.  Get help right away if you:  · Get a very bad headache.  · Start to feel mixed up (confused).  · Feel weak or numb.  · Feel faint.  · Have very bad pain in your:  ? Chest.  ? Belly (abdomen).  · Throw up more than once.  · Have trouble breathing.  Summary  · Hypertension is another name for high blood pressure.  · High blood pressure forces your heart to work harder to pump blood.  · For most people, a normal blood pressure is less than 120/80.  · Making healthy choices can help lower blood pressure. If your blood pressure does not get lower with healthy choices, you may need to take medicine.  This information is not intended to replace advice given to you by your health care provider. Make sure you discuss any questions you have with your health care provider.  Document Released: 06/05/2009 Document Revised: 08/28/2019 Document Reviewed: 08/28/2019  [employer] Patient Education © 2020 [employer]  Arthritis  Arthritis means joint pain. It can also mean joint disease. A joint is a place where bones come together. There are more than 100 types of arthritis.  What are the causes?  This condition may be caused by:  · Wear and tear of a joint. This is the most common cause.  · A lot of acid in the blood, which leads to pain in the joint (gout).  · Pain and swelling (inflammation) in a joint.  · Infection of a joint.  · Injuries in the joint.  · A reaction to medicines  (allergy).  In some cases, the cause may not be known.  What are the signs or symptoms?  Symptoms of this condition include:  · Redness at a joint.  · Swelling at a joint.  · Stiffness at a joint.  · Warmth coming from the joint.  · A fever.  · A feeling of being sick.  How is this treated?  This condition may be treated with:  · Treating the cause, if it is known.  · Rest.  · Raising (elevating) the joint.  · Putting cold or hot packs on the joint.  · Medicines to treat symptoms and reduce pain and swelling.  · Shots of medicines (cortisone) into the joint.  You may also be told to make changes in your life, such as doing exercises and losing weight.  Follow these instructions at home:  Medicines  · Take over-the-counter and prescription medicines only as told by your doctor.  · Do not take aspirin for pain if your doctor says that you may have gout.  Activity  · Rest your joint if your doctor tells you to.  · Avoid activities that make the pain worse.  · Exercise your joint regularly as told by your doctor. Try doing exercises like:  ? Swimming.  ? Water aerobics.  ? Biking.  ? Walking.  Managing pain, stiffness, and swelling         · If told, put ice on the affected area.  ? Put ice in a plastic bag.  ? Place a towel between your skin and the bag.  ? Leave the ice on for 20 minutes, 2-3 times per day.  · If your joint is swollen, raise (elevate) it above the level of your heart if told by your doctor.  · If your joint feels stiff in the morning, try taking a warm shower.  · If told, put heat on the affected area. Do this as often as told by your doctor. Use the heat source that your doctor recommends, such as a moist heat pack or a heating pad. If you have diabetes, do not apply heat without asking your doctor. To apply heat:  ? Place a towel between your skin and the heat source.  ? Leave the heat on for 20-30 minutes.  ? Remove the heat if your skin turns bright red. This is very important if you are unable to  feel pain, heat, or cold. You may have a greater risk of getting burned.  General instructions  · Do not use any products that contain nicotine or tobacco, such as cigarettes, e-cigarettes, and chewing tobacco. If you need help quitting, ask your doctor.  · Keep all follow-up visits as told by your doctor. This is important.  Contact a doctor if:  · The pain gets worse.  · You have a fever.  Get help right away if:  · You have very bad pain in your joint.  · You have swelling in your joint.  · Your joint is red.  · Many joints become painful and swollen.  · You have very bad back pain.  · Your leg is very weak.  · You cannot control your pee (urine) or poop (stool).  Summary  · Arthritis means joint pain. It can also mean joint disease. A joint is a place where bones come together.  · The most common cause of this condition is wear and tear of a joint.  · Symptoms of this condition include redness, swelling, or stiffness of the joint.  · This condition is treated with rest, raising the joint, medicines, and putting cold or hot packs on the joint.  · Follow your doctor's instructions about medicines, activity, exercises, and other home care treatments.  This information is not intended to replace advice given to you by your health care provider. Make sure you discuss any questions you have with your health care provider.  Document Released: 03/14/2011 Document Revised: 11/25/2019 Document Reviewed: 11/25/2019  [employer] Patient Education © 2020 [employer]  High Cholesterol    High cholesterol is a condition in which the blood has high levels of a white, waxy, fat-like substance (cholesterol). The human body needs small amounts of cholesterol. The liver makes all the cholesterol that the body needs. Extra (excess) cholesterol comes from the food that we eat.  Cholesterol is carried from the liver by the blood through the blood vessels. If you have high cholesterol, deposits (plaques) may build up on the walls of  "your blood vessels (arteries). Plaques make the arteries narrower and stiffer. Cholesterol plaques increase your risk for heart attack and stroke. Work with your health care provider to keep your cholesterol levels in a healthy range.  What increases the risk?  This condition is more likely to develop in people who:  · Eat foods that are high in animal fat (saturated fat) or cholesterol.  · Are overweight.  · Are not getting enough exercise.  · Have a family history of high cholesterol.  What are the signs or symptoms?  There are no symptoms of this condition.  How is this diagnosed?  This condition may be diagnosed from the results of a blood test.  · If you are older than age 20, your health care provider may check your cholesterol every 4-6 years.  · You may be checked more often if you already have high cholesterol or other risk factors for heart disease.  The blood test for cholesterol measures:  · \"Bad\" cholesterol (LDL cholesterol). This is the main type of cholesterol that causes heart disease. The desired level for LDL is less than 100.  · \"Good\" cholesterol (HDL cholesterol). This type helps to protect against heart disease by cleaning the arteries and carrying the LDL away. The desired level for HDL is 60 or higher.  · Triglycerides. These are fats that the body can store or burn for energy. The desired number for triglycerides is lower than 150.  · Total cholesterol. This is a measure of the total amount of cholesterol in your blood, including LDL cholesterol, HDL cholesterol, and triglycerides. A healthy number is less than 200.  How is this treated?  This condition is treated with diet changes, lifestyle changes, and medicines.  Diet changes  · This may include eating more whole grains, fruits, vegetables, nuts, and fish.  · This may also include cutting back on red meat and foods that have a lot of added sugar.  Lifestyle changes  · Changes may include getting at least 40 minutes of aerobic exercise 3 " times a week. Aerobic exercises include walking, biking, and swimming. Aerobic exercise along with a healthy diet can help you maintain a healthy weight.  · Changes may also include quitting smoking.  Medicines  · Medicines are usually given if diet and lifestyle changes have failed to reduce your cholesterol to healthy levels.  · Your health care provider may prescribe a statin medicine. Statin medicines have been shown to reduce cholesterol, which can reduce the risk of heart disease.  Follow these instructions at home:  Eating and drinking  If told by your health care provider:  · Eat chicken (without skin), fish, veal, shellfish, ground turkey breast, and round or loin cuts of red meat.  · Do not eat fried foods or fatty meats, such as hot dogs and salami.  · Eat plenty of fruits, such as apples.  · Eat plenty of vegetables, such as broccoli, potatoes, and carrots.  · Eat beans, peas, and lentils.  · Eat grains such as barley, rice, couscous, and bulgur wheat.  · Eat pasta without cream sauces.  · Use skim or nonfat milk, and eat low-fat or nonfat yogurt and cheeses.  · Do not eat or drink whole milk, cream, ice cream, egg yolks, or hard cheeses.  · Do not eat stick margarine or tub margarines that contain trans fats (also called partially hydrogenated oils).  · Do not eat saturated tropical oils, such as coconut oil and palm oil.  · Do not eat cakes, cookies, crackers, or other baked goods that contain trans fats.    General instructions  · Exercise as directed by your health care provider. Increase your activity level with activities such as gardening, walking, and taking the stairs.  · Take over-the-counter and prescription medicines only as told by your health care provider.  · Do not use any products that contain nicotine or tobacco, such as cigarettes and e-cigarettes. If you need help quitting, ask your health care provider.  · Keep all follow-up visits as told by your health care provider. This is  important.  Contact a health care provider if:  · You are struggling to maintain a healthy diet or weight.  · You need help to start on an exercise program.  · You need help to stop smoking.  Get help right away if:  · You have chest pain.  · You have trouble breathing.  This information is not intended to replace advice given to you by your health care provider. Make sure you discuss any questions you have with your health care provider.  Document Released: 12/18/2006 Document Revised: 12/21/2018 Document Reviewed: 06/17/2017  [employer] Patient Education © 2020 [employer]

## 2020-07-21 LAB
BUN SERPL-MCNC: 16 MG/DL (ref 8–27)
BUN/CREAT SERPL: 17 (ref 10–24)
CALCIUM SERPL-MCNC: 9.3 MG/DL (ref 8.6–10.2)
CHLORIDE SERPL-SCNC: 102 MMOL/L (ref 96–106)
CO2 SERPL-SCNC: 25 MMOL/L (ref 20–29)
CREAT SERPL-MCNC: 0.93 MG/DL (ref 0.76–1.27)
GLUCOSE SERPL-MCNC: 80 MG/DL (ref 65–99)
POTASSIUM SERPL-SCNC: 5.7 MMOL/L (ref 3.5–5.2)
SODIUM SERPL-SCNC: 136 MMOL/L (ref 134–144)

## 2020-07-24 ENCOUNTER — TELEPHONE (OUTPATIENT)
Dept: FAMILY MEDICINE CLINIC | Facility: CLINIC | Age: 75
End: 2020-07-24

## 2020-07-24 NOTE — TELEPHONE ENCOUNTER
Pt notified of Delia's notes - pt denies taking any multi vitamins and voices understanding to repeat lab on Monday.

## 2020-07-24 NOTE — TELEPHONE ENCOUNTER
I do not want to change his blood pressure medication at this time.  Have him stop any multivitamin he is taking. Repeat potassium again on Monday.    Subjective:       Patient ID: Gabi Newton is a 53 y.o. female.    Chief Complaint: Lesion    Patient referred to Interventional Radiology by Dr. Ann for evaluation of a right lung mass. She has a history of gastric marginal zone B-cell lymphoma, as well as a 40+ years smoking history. Pulmonary nodule was originally noted in 2016. Chest CT obtained in 11/2017 noted a 1.7 x 1.3 cm lesion in the right lower lobe and 2 x 1 cm lesion in the anterior portion of the right lower lobe. Repeat CT scan obtained on 9/25/2019 revealed a complex poorly defined mass in the right lower lobe measuring 2.78 x 2.64 cm, suspicious for primary lung carcinoma. She denies any chronic cough or hemoptysis. She does admit to occasional dyspnea when she is constipated. She is accompanied by her sister and friend.     Review of Systems   Constitutional: Positive for activity change (decreased) and fatigue. Negative for appetite change, chills and fever.   HENT: Negative for congestion, drooling, ear discharge, postnasal drip, sneezing and trouble swallowing.    Eyes: Negative for pain, discharge, redness and itching.        Wears glasses   Respiratory: Positive for shortness of breath (when constipated). Negative for cough, wheezing and stridor.    Cardiovascular: Positive for chest pain (comes and goes; believes due to GERD; in care for this) and leg swelling. Negative for palpitations.   Gastrointestinal: Positive for abdominal pain (occasional stomach cramps) and nausea (usually in the morning; gets better through the day; also feels like when she has a bowel movement this feels better). Negative for abdominal distention, constipation, diarrhea and vomiting.   Genitourinary: Negative for difficulty urinating, dysuria, frequency and urgency.   Musculoskeletal: Positive for arthralgias (intermittent bilateral shoulder pain) and myalgias (intermittently in calves). Negative for back pain, gait problem, joint swelling and neck pain.    Skin: Negative for color change, pallor and rash.   Neurological: Negative for dizziness, weakness and headaches.   Psychiatric/Behavioral: Positive for sleep disturbance.       Objective:      Physical Exam   Constitutional: She is oriented to person, place, and time. She appears well-developed and well-nourished. No distress.   HENT:   Head: Normocephalic and atraumatic.   Right Ear: External ear normal.   Left Ear: External ear normal.   Nose: Nose normal.   Mouth/Throat: Oropharynx is clear and moist. No oropharyngeal exudate.   Eyes: Pupils are equal, round, and reactive to light. Conjunctivae are normal. Right eye exhibits no discharge. Left eye exhibits no discharge. No scleral icterus.   Neck: Neck supple. No JVD present. No tracheal deviation present. No thyromegaly present.   Cardiovascular: Normal rate, regular rhythm, normal heart sounds and intact distal pulses. Exam reveals no gallop and no friction rub.   No murmur heard.  Pulmonary/Chest: Effort normal and breath sounds normal. No stridor. No respiratory distress. She has no wheezes. She has no rales.   Abdominal: Soft. Bowel sounds are normal. She exhibits no distension and no mass. There is no hepatosplenomegaly. There is no tenderness. There is no rebound and no guarding.   Musculoskeletal: She exhibits no edema.   Lymphadenopathy:     She has no cervical adenopathy.   Neurological: She is alert and oriented to person, place, and time. Gait normal.   Skin: Skin is warm and dry. She is not diaphoretic. No cyanosis. Nails show no clubbing.   Psychiatric: She has a normal mood and affect.   Vitals reviewed.      Imaging:  PET 12/10/2019          Assessment:       1. Lung nodule        Plan:         Discussed case with Dr. Gupta and Dr. Oconnor. Explained to patient can offer biopsy of lung lesion. Discussed how the procedure will be performed, risks (including, but not limited to, pain, bleeding, infection, damage to nearby structures, and the  need for additional procedures), benefits, possible complications, pre-post procedure expectations, and alternatives. The patient voices understanding and all questions have been answered.  The patient agrees to proceed as planned. Dr. Oconnor in room. Written informed consent obtained. Patient scheduled for 1/20/2020. Pre-procedure handout with clinic phone number provided. Patient will have pre-procedure labs drawn today.

## 2020-07-28 LAB
BUN SERPL-MCNC: 14 MG/DL (ref 8–23)
BUN/CREAT SERPL: 15.6 (ref 7–25)
CALCIUM SERPL-MCNC: 9 MG/DL (ref 8.6–10.5)
CHLORIDE SERPL-SCNC: 100 MMOL/L (ref 98–107)
CO2 SERPL-SCNC: 24.9 MMOL/L (ref 22–29)
CREAT SERPL-MCNC: 0.9 MG/DL (ref 0.76–1.27)
GLUCOSE SERPL-MCNC: 89 MG/DL (ref 65–99)
POTASSIUM SERPL-SCNC: 5.3 MMOL/L (ref 3.5–5.2)
SODIUM SERPL-SCNC: 135 MMOL/L (ref 136–145)

## 2020-08-03 ENCOUNTER — OFFICE VISIT (OUTPATIENT)
Dept: FAMILY MEDICINE CLINIC | Facility: CLINIC | Age: 75
End: 2020-08-03

## 2020-08-03 VITALS
BODY MASS INDEX: 26.22 KG/M2 | DIASTOLIC BLOOD PRESSURE: 72 MMHG | SYSTOLIC BLOOD PRESSURE: 142 MMHG | WEIGHT: 173 LBS | HEART RATE: 48 BPM | TEMPERATURE: 97.1 F | RESPIRATION RATE: 16 BRPM | HEIGHT: 68 IN | OXYGEN SATURATION: 98 %

## 2020-08-03 DIAGNOSIS — I10 HYPERTENSION, UNSPECIFIED TYPE: Primary | ICD-10-CM

## 2020-08-03 PROCEDURE — 99213 OFFICE O/P EST LOW 20 MIN: CPT | Performed by: FAMILY MEDICINE

## 2020-08-03 NOTE — PROGRESS NOTES
Subjective   Chief Complaint:   Chief Complaint   Patient presents with   • High Potassium         History of Present Illness office to try to decide or find out why his potassium is high his last 3 potassiums in our lab was 5.7 and 5.75.3 that was 7/13/2020 and 7/20/2020 and 7/272025 look at potassiums going backwards from there he had a potassium of 4.5 and 4.8 and 5.6 and 4.84.6 so he has elevated potassium in the past.  Is just a little bit higher the last 3 potassiums.  On Altase 5 mg but that has not been a new drug he has been on that has not changed.  So we will get a BMP in 2 weeks I want him to try to stay out of the heat and stay out from cutting grass and be really hydrated the next 3 or 4 days before he gets the blood test particularly meds drink lots of water.  Recheck a BMP in a week.  Again there is not been any new drugs lately and again his last 3 potassiums were going backwards from 7 27-7 20 20-7 1320 was 5.3 and 5.7 and 5.7.  But I will make sure before he drawls blood next visit in 2 weeks and I will just call those to him to make sure he drinks lots of water.  Last 3 k+  5.7, 5.7, 5.3      Past Medical History:   Diagnosis Date   • Anxiety    • Arthritis    • Atrial flutter (CMS/HCC)     Status post cavotricuspid isthmus ablation by Dr. Gustafson on 4/18/17   • Mandel esophagus    • Benign essential hypertension    • CAD (coronary artery disease)     3 vessel CABG 4/11/17 by Dr. Cortez: ROSARIO-prox LAD, SVG-OM1, SVG-OM3   • Carotid artery disease (CMS/HCC)     Status post carotid endarterectomy - [facility] 4/10/17: 50-59% NICHELLE, 1-15% LICA.    • Colonic polyp    • DDD (degenerative disc disease), lumbosacral    • GERD (gastroesophageal reflux disease)    • H/O bone density study 2013   • H/O complete eye exam 2014   • HLD (hyperlipidemia)    • Hypertension    • Lipid screening 05/31/2013   • Low back pain     [facility]   • Screening for prostate cancer 07/07/2015   •  Stroke (CMS/HCC)         Madhu Santoro 74 y.o. male who presents today for Medical Management of High Potassium.    ICD-10-CM ICD-9-CM   1. Hypertension, unspecified type I10 401.9        he has a problem list of   Patient Active Problem List   Diagnosis   • Anxiety   • Arthritis   • Chronic coronary artery disease   • HLD (hyperlipidemia)   • Benign essential hypertension   • DDD (degenerative disc disease), lumbosacral   • Cerebrovascular accident (CMS/HCC)   • Encounter for screening for cardiovascular disorders   • Gastroesophageal reflux disease   • Hyperlipidemia   • Stenosis of carotid artery   • Former smoker   • History of cardiac catheterization   • S/P ablation of atrial flutter   • Typical atrial flutter (CMS/[facility])   • S/P CABG (coronary artery bypass graft)   • Adenomatous polyp of ascending colon   • Abdominal bloating   • Stroke (CMS/HCC)   • PAD (peripheral artery disease) (CMS/[facility])   • Acute cholecystitis   • Right upper quadrant abdominal pain   .    he has been compliant with   Current Outpatient Medications:   •  albuterol sulfate  (90 Base) MCG/ACT inhaler, Inhale 2 puffs Every 4 (Four) Hours As Needed for Wheezing., Disp: 1 inhaler, Rfl: 0  •  clopidogrel (PLAVIX) 75 MG tablet, Take 1 tablet by mouth Daily., Disp: 90 tablet, Rfl: 1  •  dicyclomine (BENTYL) 20 MG tablet, Take 1 tablet by mouth Every 6 (Six) Hours As Needed (abdominal cramps)., Disp: 20 tablet, Rfl: 0  •  fluorouracil (EFUDEX) 5 % cream, , Disp: , Rfl:   •  HYDROcodone-acetaminophen (NORCO) 5-325 MG per tablet, Take one tablet by mouth twice a day PRN pain, Disp: 60 tablet, Rfl: 0  •  nitroglycerin (NITROSTAT) 0.4 MG SL tablet, Place 1 tablet under the tongue Every 5 (Five) Minutes As Needed for Chest Pain. Take no more than 3 doses in 15 minutes., Disp: 20 tablet, Rfl: 2  •  ondansetron ODT (ZOFRAN-ODT) 4 MG disintegrating tablet, Take 1 tablet by mouth Every 6 (Six) Hours As Needed for Nausea or Vomiting., Disp: 12  "tablet, Rfl: 0  •  pantoprazole (PROTONIX) 40 MG EC tablet, Take 1 tablet by mouth Daily., Disp: 90 tablet, Rfl: 1  •  ramipril (ALTACE) 5 MG capsule, Take 1 capsule by mouth Daily., Disp: 90 capsule, Rfl: 1  •  rosuvastatin (CRESTOR) 10 MG tablet, Take 1 tablet by mouth Daily., Disp: 90 tablet, Rfl: 1.  he denies medication side effects.        /72   Pulse (!) 48   Temp 97.1 °F (36.2 °C)   Resp 16   Ht 172.7 cm (67.99\")   Wt 78.5 kg (173 lb)   SpO2 98%   BMI 26.31 kg/m²     Results for orders placed or performed in visit on 07/20/20   Basic Metabolic Panel   Result Value Ref Range    Glucose 89 65 - 99 mg/dL    BUN 14 8 - 23 mg/dL    Creatinine 0.90 0.76 - 1.27 mg/dL    eGFR Non African Am 82 >60 mL/min/1.73    eGFR African Am 100 >60 mL/min/1.73    BUN/Creatinine Ratio 15.6 7.0 - 25.0    Sodium 135 (L) 136 - 145 mmol/L    Potassium 5.3 (H) 3.5 - 5.2 mmol/L    Chloride 100 98 - 107 mmol/L    Total CO2 24.9 22.0 - 29.0 mmol/L    Calcium 9.0 8.6 - 10.5 mg/dL       The following portions of the patient's history were reviewed and updated as appropriate: allergies, current medications, past family history, past medical history, past social history, past surgical history and problem list.      he has a history of   Patient Active Problem List   Diagnosis   • Anxiety   • Arthritis   • Chronic coronary artery disease   • HLD (hyperlipidemia)   • Benign essential hypertension   • DDD (degenerative disc disease), lumbosacral   • Cerebrovascular accident (CMS/HCC)   • Encounter for screening for cardiovascular disorders   • Gastroesophageal reflux disease   • Hyperlipidemia   • Stenosis of carotid artery   • Former smoker   • History of cardiac catheterization   • S/P ablation of atrial flutter   • Typical atrial flutter (CMS/HCC)   • S/P CABG (coronary artery bypass graft)   • Adenomatous polyp of ascending colon   • Abdominal bloating   • Stroke (CMS/HCC)   • PAD (peripheral artery disease) (CMS/HCC)   • Acute " cholecystitis   • Right upper quadrant abdominal pain       Review of Systems   Constitutional: Negative for activity change and appetite change.   Respiratory: Negative for cough and shortness of breath.    Cardiovascular: Negative for chest pain.   Gastrointestinal: Negative for abdominal pain.   Endocrine: Negative for polyuria.   Genitourinary: Negative for dysuria.   Musculoskeletal: Negative for back pain.   Neurological: Negative for dizziness.   Psychiatric/Behavioral: The patient is not nervous/anxious.        Objective   Physical Exam   Constitutional: He is oriented to person, place, and time.   HENT:   Head: Normocephalic.   Eyes: Pupils are equal, round, and reactive to light.   Cardiovascular: Normal rate and regular rhythm.   Pulmonary/Chest: Effort normal and breath sounds normal.   Abdominal: Soft. Bowel sounds are normal.   Musculoskeletal: Normal range of motion. He exhibits no edema.   Neurological: He is alert and oriented to person, place, and time.   Skin: Skin is warm and dry.   Psychiatric: He has a normal mood and affect.   Nursing note and vitals reviewed.      Assessment/Plan   aMdhu was seen today for high potassium.    Diagnoses and all orders for this visit:    Hypertension, unspecified type  -     Comprehensive Metabolic Panel      Labs results discussed in detail with the patient, if no recent labs were done, order placed today.  Plan to update vaccines if needed today.  I  reviewed health maintenance with the patient as part of my preventative care plan. I discussed preventative counseling with the patient in detail.

## 2020-08-03 NOTE — PATIENT INSTRUCTIONS
Hyperkalemia  Hyperkalemia occurs when the level of potassium in your blood is too high. Potassium is an important nutrient that helps the muscles and nerves function normally. It affects how the heart works, and it helps keep fluids and minerals balanced in the body. If there is too much potassium in your blood, it can affect your heart's ability to function normally.  Potassium is normally removed (excreted) from the body by the kidneys. Hyperkalemia can result from various conditions. It can range from mild to severe.  What are the causes?  This condition may be caused by:  · Taking in too much potassium. You can do this by:  ? Using salt substitutes. They contain large amounts of potassium.  ? Taking potassium supplements.  ? Eating foods that are high in potassium.  · Excreting too little potassium. This can happen if:  ? Your kidneys are not working properly. Kidney (renal) disease, including short-term or long-term renal failure, is a common cause of hyperkalemia.  ? You are taking medicines that lower your excretion of potassium.  ? You have Kendell's disease.  ? You have a urinary tract blockage, such as kidney stones.  ? You are on treatment to mechanically clean your blood (dialysis) and you skip a treatment.  · Releasing a high amount of potassium from your cells into your blood. This can happen with:  ? Injury to muscles (rhabdomyolysis) or other tissues. Most potassium is stored in your muscles.  ? Severe burns or infections.  ? Acidic blood plasma (acidosis). Acidosis can result from many diseases, such as uncontrolled diabetes.  What increases the risk?  The following factors may make you more likely to develop this condition:  · Kidney disease. This puts you at the highest risk.  · Kendell's disease. This is a condition where the adrenal glands do not produce enough hormones.  · Alcoholism or heavy drug use.  · Using certain blood pressure medicines, such as ACE inhibitors, angiotensin II receptor  blockers (ARBs), or potassium-sparing diuretics such as spironolactone.  · Severe injury or burn.  What are the signs or symptoms?  In many cases, there are no symptoms. However, when your potassium level becomes high enough, you may have symptoms such as:  · An irregular or very slow heartbeat.  · Nausea.  · Tiredness (fatigue).  · Confusion.  · Tingling of your skin or numbness of your hands or feet.  · Muscle cramps.  · Muscle weakness.  · Not being able to move (paralysis).  How is this diagnosed?  This condition may be diagnosed based on:  · Your symptoms and medical history. Your health care provider will ask about your use of prescription and non-prescription drugs.  · A physical exam.  · Blood tests.  · An electrocardiogram (ECG).  How is this treated?  Treatment depends on the cause and severity of your condition. Treatment may need to be done in the hospital setting. Treatment may include:  · IV glucose (sugar) along with insulin to shift potassium out of your blood and into your cells.  · A medicine called albuterol to shift potassium out of your blood and into your cells.  · Medicines to remove the potassium from your body.  · Dialysis to remove the potassium from your body.  · Calcium to protect your heart from the effects of high potassium, such as irregular rhythms (arrhythmias).  Follow these instructions at home:    · Take over-the-counter and prescription medicines only as told by your health care provider.  · Do not take any supplements, natural products, herbs, or vitamins without reviewing them with your health care provider. Certain supplements and natural food products contain high amounts of potassium.  · Limit your alcohol intake as told by your health care provider.  · Do not use drugs. If you need help quitting, ask your health care provider.  · If you have kidney disease, you may need to follow a low-potassium diet. A dietitian can help you learn which foods have high or low amounts of  potassium.  · Keep all follow-up visits as told by your health care provider. This is important.  Contact a health care provider if you:  · Have an irregular or very slow heartbeat.  · Feel light-headed.  · Feel weak.  · Are nauseous.  · Have tingling or numbness in your hands or feet.  Get help right away if you:  · Have shortness of breath.  · Have chest pain or discomfort.  · Pass out.  · Have muscle paralysis.  Summary  · Hyperkalemia occurs when the level of potassium in your blood is too high.  · This condition may be caused by taking in too much potassium, excreting too little potassium, or releasing a high amount of potassium from your cells into your blood.  · Hyperkalemia can result from many underlying conditions, especially chronic kidney disease, or from taking certain medicines.  · Treatment of hyperkalemia may include medicine to shift potassium out of your blood and into your cells or to remove the potassium from your body.  · If you have kidney disease, you may need to follow a low-potassium diet. A dietitian can help you learn which foods have high or low amounts of potassium.  This information is not intended to replace advice given to you by your health care provider. Make sure you discuss any questions you have with your health care provider.  Document Released: 12/08/2003 Document Revised: 12/03/2018 Document Reviewed: 12/03/2018  Elsevier Patient Education © 2020 [employer]

## 2020-08-18 LAB
ALBUMIN SERPL-MCNC: 4.1 G/DL (ref 3.5–5.2)
ALBUMIN/GLOB SERPL: 2 G/DL
ALP SERPL-CCNC: 93 U/L (ref 39–117)
ALT SERPL-CCNC: 16 U/L (ref 1–41)
AST SERPL-CCNC: 24 U/L (ref 1–40)
BILIRUB SERPL-MCNC: 0.8 MG/DL (ref 0–1.2)
BUN SERPL-MCNC: 12 MG/DL (ref 8–23)
BUN/CREAT SERPL: 12.4 (ref 7–25)
CALCIUM SERPL-MCNC: 8.6 MG/DL (ref 8.6–10.5)
CHLORIDE SERPL-SCNC: 102 MMOL/L (ref 98–107)
CO2 SERPL-SCNC: 26.5 MMOL/L (ref 22–29)
CREAT SERPL-MCNC: 0.97 MG/DL (ref 0.76–1.27)
GLOBULIN SER CALC-MCNC: 2.1 GM/DL
GLUCOSE SERPL-MCNC: 91 MG/DL (ref 65–99)
POTASSIUM SERPL-SCNC: 5 MMOL/L (ref 3.5–5.2)
PROT SERPL-MCNC: 6.2 G/DL (ref 6–8.5)
SODIUM SERPL-SCNC: 138 MMOL/L (ref 136–145)

## 2020-08-20 DIAGNOSIS — M19.90 ARTHRITIS: ICD-10-CM

## 2020-08-20 RX ORDER — HYDROCODONE BITARTRATE AND ACETAMINOPHEN 5; 325 MG/1; MG/1
TABLET ORAL
Qty: 60 TABLET | Refills: 0 | Status: SHIPPED | OUTPATIENT
Start: 2020-08-20 | End: 2020-09-20 | Stop reason: SDUPTHER

## 2020-09-18 DIAGNOSIS — M19.90 ARTHRITIS: Primary | ICD-10-CM

## 2020-09-18 RX ORDER — HYDROCODONE BITARTRATE AND ACETAMINOPHEN 5; 325 MG/1; MG/1
TABLET ORAL
Qty: 60 TABLET | Refills: 0 | Status: CANCELLED | OUTPATIENT
Start: 2020-09-18

## 2020-09-18 NOTE — TELEPHONE ENCOUNTER
Patient requesting refill of Hydrocodone.  Pt last seen in office on 8/3/2020.  RX last filled on 8/20/2020

## 2020-09-20 DIAGNOSIS — M19.90 ARTHRITIS: ICD-10-CM

## 2020-09-20 RX ORDER — HYDROCODONE BITARTRATE AND ACETAMINOPHEN 5; 325 MG/1; MG/1
TABLET ORAL
Qty: 60 TABLET | Refills: 0 | Status: SHIPPED | OUTPATIENT
Start: 2020-09-20 | End: 2020-09-21 | Stop reason: SDUPTHER

## 2020-09-23 RX ORDER — HYDROCODONE BITARTRATE AND ACETAMINOPHEN 5; 325 MG/1; MG/1
1 TABLET ORAL EVERY 6 HOURS PRN
Qty: 60 TABLET | Refills: 0 | Status: SHIPPED | OUTPATIENT
Start: 2020-09-23 | End: 2020-11-03 | Stop reason: SDUPTHER

## 2020-10-20 ENCOUNTER — TELEPHONE (OUTPATIENT)
Dept: FAMILY MEDICINE CLINIC | Facility: CLINIC | Age: 75
End: 2020-10-20

## 2020-10-20 DIAGNOSIS — E78.5 HYPERLIPIDEMIA, UNSPECIFIED HYPERLIPIDEMIA TYPE: ICD-10-CM

## 2020-10-20 DIAGNOSIS — I10 BENIGN ESSENTIAL HYPERTENSION: Primary | ICD-10-CM

## 2020-10-20 DIAGNOSIS — I25.10 CHRONIC CORONARY ARTERY DISEASE: ICD-10-CM

## 2020-10-28 LAB
ALBUMIN SERPL-MCNC: 4 G/DL (ref 3.5–5.2)
ALBUMIN/GLOB SERPL: 1.5 G/DL
ALP SERPL-CCNC: 100 U/L (ref 39–117)
ALT SERPL-CCNC: 15 U/L (ref 1–41)
AST SERPL-CCNC: 24 U/L (ref 1–40)
BASOPHILS # BLD AUTO: 0.03 10*3/MM3 (ref 0–0.2)
BASOPHILS NFR BLD AUTO: 0.5 % (ref 0–1.5)
BILIRUB SERPL-MCNC: 0.8 MG/DL (ref 0–1.2)
BUN SERPL-MCNC: 11 MG/DL (ref 8–23)
BUN/CREAT SERPL: 11.1 (ref 7–25)
CALCIUM SERPL-MCNC: 9.3 MG/DL (ref 8.6–10.5)
CHLORIDE SERPL-SCNC: 104 MMOL/L (ref 98–107)
CHOLEST SERPL-MCNC: 135 MG/DL (ref 0–200)
CO2 SERPL-SCNC: 27.8 MMOL/L (ref 22–29)
CREAT SERPL-MCNC: 0.99 MG/DL (ref 0.76–1.27)
EOSINOPHIL # BLD AUTO: 0.1 10*3/MM3 (ref 0–0.4)
EOSINOPHIL NFR BLD AUTO: 1.6 % (ref 0.3–6.2)
ERYTHROCYTE [DISTWIDTH] IN BLOOD BY AUTOMATED COUNT: 14.5 % (ref 12.3–15.4)
GLOBULIN SER CALC-MCNC: 2.6 GM/DL
GLUCOSE SERPL-MCNC: 106 MG/DL (ref 65–99)
HCT VFR BLD AUTO: 54.3 % (ref 37.5–51)
HDLC SERPL-MCNC: 47 MG/DL (ref 40–60)
HGB BLD-MCNC: 16.9 G/DL (ref 13–17.7)
IMM GRANULOCYTES # BLD AUTO: 0.06 10*3/MM3 (ref 0–0.05)
IMM GRANULOCYTES NFR BLD AUTO: 0.9 % (ref 0–0.5)
LDLC SERPL CALC-MCNC: 73 MG/DL (ref 0–100)
LYMPHOCYTES # BLD AUTO: 1.33 10*3/MM3 (ref 0.7–3.1)
LYMPHOCYTES NFR BLD AUTO: 20.8 % (ref 19.6–45.3)
MCH RBC QN AUTO: 27.1 PG (ref 26.6–33)
MCHC RBC AUTO-ENTMCNC: 31.1 G/DL (ref 31.5–35.7)
MCV RBC AUTO: 87 FL (ref 79–97)
MONOCYTES # BLD AUTO: 0.88 10*3/MM3 (ref 0.1–0.9)
MONOCYTES NFR BLD AUTO: 13.8 % (ref 5–12)
NEUTROPHILS # BLD AUTO: 3.99 10*3/MM3 (ref 1.7–7)
NEUTROPHILS NFR BLD AUTO: 62.4 % (ref 42.7–76)
NRBC BLD AUTO-RTO: 0 /100 WBC (ref 0–0.2)
PLATELET # BLD AUTO: 407 10*3/MM3 (ref 140–450)
POTASSIUM SERPL-SCNC: 5.4 MMOL/L (ref 3.5–5.2)
PROT SERPL-MCNC: 6.6 G/DL (ref 6–8.5)
RBC # BLD AUTO: 6.24 10*6/MM3 (ref 4.14–5.8)
SODIUM SERPL-SCNC: 141 MMOL/L (ref 136–145)
TRIGL SERPL-MCNC: 73 MG/DL (ref 0–150)
TSH SERPL DL<=0.005 MIU/L-ACNC: 3.17 UIU/ML (ref 0.27–4.2)
VLDLC SERPL CALC-MCNC: 15 MG/DL (ref 5–40)
WBC # BLD AUTO: 6.39 10*3/MM3 (ref 3.4–10.8)

## 2020-11-03 ENCOUNTER — OFFICE VISIT (OUTPATIENT)
Dept: FAMILY MEDICINE CLINIC | Facility: CLINIC | Age: 75
End: 2020-11-03

## 2020-11-03 VITALS — BODY MASS INDEX: 26.22 KG/M2 | WEIGHT: 173 LBS | HEIGHT: 68 IN

## 2020-11-03 DIAGNOSIS — I63.9 CEREBROVASCULAR ACCIDENT (CVA), UNSPECIFIED MECHANISM (HCC): ICD-10-CM

## 2020-11-03 DIAGNOSIS — I10 BENIGN ESSENTIAL HYPERTENSION: ICD-10-CM

## 2020-11-03 DIAGNOSIS — I25.83 CORONARY ARTERY DISEASE DUE TO LIPID RICH PLAQUE: ICD-10-CM

## 2020-11-03 DIAGNOSIS — I25.10 CORONARY ARTERY DISEASE DUE TO LIPID RICH PLAQUE: ICD-10-CM

## 2020-11-03 DIAGNOSIS — M19.90 ARTHRITIS: ICD-10-CM

## 2020-11-03 PROCEDURE — 99442 PR PHYS/QHP TELEPHONE EVALUATION 11-20 MIN: CPT | Performed by: FAMILY MEDICINE

## 2020-11-03 RX ORDER — HYDROCODONE BITARTRATE AND ACETAMINOPHEN 5; 325 MG/1; MG/1
1 TABLET ORAL EVERY 6 HOURS PRN
Qty: 60 TABLET | Refills: 0 | Status: SHIPPED | OUTPATIENT
Start: 2020-11-03 | End: 2020-12-03 | Stop reason: SDUPTHER

## 2020-11-03 RX ORDER — ROSUVASTATIN CALCIUM 10 MG/1
10 TABLET, COATED ORAL DAILY
Qty: 90 TABLET | Refills: 1 | Status: SHIPPED | OUTPATIENT
Start: 2020-11-03 | End: 2021-04-13 | Stop reason: HOSPADM

## 2020-11-03 RX ORDER — PANTOPRAZOLE SODIUM 40 MG/1
40 TABLET, DELAYED RELEASE ORAL DAILY
Qty: 90 TABLET | Refills: 1 | Status: SHIPPED | OUTPATIENT
Start: 2020-11-03 | End: 2021-04-29

## 2020-11-03 RX ORDER — RAMIPRIL 5 MG/1
5 CAPSULE ORAL DAILY
Qty: 90 CAPSULE | Refills: 1 | Status: SHIPPED | OUTPATIENT
Start: 2020-11-03 | End: 2021-02-13 | Stop reason: SDUPTHER

## 2020-11-03 RX ORDER — MIRABEGRON 25 MG/1
25 TABLET, FILM COATED, EXTENDED RELEASE ORAL DAILY
COMMUNITY
Start: 2020-10-05

## 2020-11-03 RX ORDER — CLOPIDOGREL BISULFATE 75 MG/1
75 TABLET ORAL DAILY
Qty: 90 TABLET | Refills: 1 | Status: SHIPPED | OUTPATIENT
Start: 2020-11-03 | End: 2021-04-13 | Stop reason: HOSPADM

## 2020-11-03 NOTE — PROGRESS NOTES
You have chosen to receive care through a telephone visit. Do you consent to use a telephone visit for your medical care today? Yes  Discussed with patient.  I went over the labs in detail with him and we discussed his labs that are good and then also I refilled his medications.  Again all medications were  refilled Crestor 10 mg 1 a day, Altace 5 mg 1 a day, Protonix 40 mg 1 a day, Norco 5-325mg  1 po bid prn. plavix 75 mg daily.  I discussed his labs with him in detail we went through his labs and medications were refilled.  Got a call me back in a month when he needs his Norco refilled and he is doing fine on all medications continue the same same  14 minutes   [id number]  Final diagnoses:   Cerebrovascular accident (CVA), unspecified mechanism (CMS/HCC)   Benign essential hypertension   Arthritis   Coronary artery disease due to lipid rich plaque

## 2020-11-03 NOTE — PROGRESS NOTES
Subjective   Madhu Santoro is a 74 y.o. male.     History of Present Illness     {Common H&P Review Areas:31384}    Review of Systems    Objective   Physical Exam      Assessment/Plan   {Assess/PlanSmartLinks:90291}

## 2020-12-03 DIAGNOSIS — M19.90 ARTHRITIS: ICD-10-CM

## 2020-12-03 RX ORDER — HYDROCODONE BITARTRATE AND ACETAMINOPHEN 5; 325 MG/1; MG/1
1 TABLET ORAL EVERY 6 HOURS PRN
Qty: 60 TABLET | Refills: 0 | Status: SHIPPED | OUTPATIENT
Start: 2020-12-03 | End: 2021-01-05 | Stop reason: SDUPTHER

## 2021-01-05 DIAGNOSIS — M19.90 ARTHRITIS: ICD-10-CM

## 2021-01-05 RX ORDER — HYDROCODONE BITARTRATE AND ACETAMINOPHEN 5; 325 MG/1; MG/1
1 TABLET ORAL EVERY 6 HOURS PRN
Qty: 60 TABLET | Refills: 0 | Status: SHIPPED | OUTPATIENT
Start: 2021-01-05 | End: 2021-02-04 | Stop reason: SDUPTHER

## 2021-02-04 ENCOUNTER — TELEPHONE (OUTPATIENT)
Dept: FAMILY MEDICINE CLINIC | Facility: CLINIC | Age: 76
End: 2021-02-04

## 2021-02-04 DIAGNOSIS — M19.90 ARTHRITIS: ICD-10-CM

## 2021-02-04 RX ORDER — HYDROCODONE BITARTRATE AND ACETAMINOPHEN 5; 325 MG/1; MG/1
1 TABLET ORAL EVERY 6 HOURS PRN
Qty: 60 TABLET | Refills: 0 | Status: SHIPPED | OUTPATIENT
Start: 2021-02-04 | End: 2021-03-03

## 2021-02-04 NOTE — TELEPHONE ENCOUNTER
Caller: MaudeMadhu    Relationship: Self    Best call back number: [phone]    Medication needed:   Requested Prescriptions     Pending Prescriptions Disp Refills   • HYDROcodone-acetaminophen (NORCO) 5-325 MG per tablet 60 tablet 0     Sig: Take 1 tablet by mouth Every 6 (Six) Hours As Needed for Mild Pain .       When do you need the refill by: asap    What details did the patient provide when requesting the medication: out of medicine    Does the patient have less than a 3 day supply:  [x] Yes  [] No    What is the patient's preferred pharmacy: [facility] [place] [address] [address] [facility] [address] [facility] [phone] [facility] [phone] FX

## 2021-02-09 ENCOUNTER — OFFICE VISIT (OUTPATIENT)
Dept: FAMILY MEDICINE CLINIC | Facility: CLINIC | Age: 76
End: 2021-02-09

## 2021-02-09 VITALS
RESPIRATION RATE: 16 BRPM | SYSTOLIC BLOOD PRESSURE: 153 MMHG | HEIGHT: 68 IN | TEMPERATURE: 96.8 F | OXYGEN SATURATION: 97 % | HEART RATE: 61 BPM | DIASTOLIC BLOOD PRESSURE: 63 MMHG | WEIGHT: 185 LBS | BODY MASS INDEX: 28.04 KG/M2

## 2021-02-09 DIAGNOSIS — M54.50 CHRONIC BILATERAL LOW BACK PAIN WITHOUT SCIATICA: ICD-10-CM

## 2021-02-09 DIAGNOSIS — I10 BENIGN ESSENTIAL HYPERTENSION: Primary | ICD-10-CM

## 2021-02-09 DIAGNOSIS — G89.29 CHRONIC BILATERAL LOW BACK PAIN WITHOUT SCIATICA: ICD-10-CM

## 2021-02-09 PROCEDURE — 99214 OFFICE O/P EST MOD 30 MIN: CPT | Performed by: FAMILY MEDICINE

## 2021-02-09 NOTE — PROGRESS NOTES
Subjective   Chief Complaint:   Chief Complaint   Patient presents with   • Hypertension   • Hyperlipidemia   • Arthritis         History of Present Illness to talk about his pain in his back.  And about his hypertension sounds like at home he is having increasing bouts of pain that is making his blood pressure go up.  I have him on Norco 5 mg 1 p.o. twice daily.  I am going to refer him to pain doctor at [facility].  I explained to him I will can give him Norco 5 mg twice daily.  Can not  increase that.  His blood pressure was elevated when I first took it it was 160/70 but as he relaxed his blood pressure came back down to 140/64 and I got 142/70 so again his blood pressure is normal and he is only on Altase 5 mg 1 a day.  Last time we got his blood pressure is 140/64.  But he is much calmer now.  Is on Norco 5 mg 1 p.o. twice daily and I am going to refer him to a pain doctor at [facility] that he can go see and see about increase in his Norco for his back pain.  2 blood pressures I got was 140/64 and then also got 150/70 and 142/70.  And at present is on Norco 5 mg 1 p.o. twice daily and he seems more relaxed now and his blood pressure is much better 150/70 142/78 140/64 leave his meds alone on recheck him back in a week and on a recheck his blood pressure again referring to a pain doctor I am going to leave his medications alone.  Again he is just on Altase 5 mg 1 p.o. daily for his blood pressure and his blood pressure last 2 that I got is 150/70 and 142/70.  His pain level goes up it increases his blood pressure    Plan:  1. Refer to pain management for chronic back pain      Past Medical History:   Diagnosis Date   • Anxiety    • Arthritis    • Atrial flutter (CMS/HCC)     Status post cavotricuspid isthmus ablation by Dr. Gustafson on 4/18/17   • Mandel esophagus    • Benign essential hypertension    • CAD (coronary artery disease)     3 vessel CABG 4/11/17 by Dr. Cortez: ROSARIO-prox LAD, SVG-OM1, SVG-OM3    • Carotid artery disease (CMS/HCC)     Status post carotid endarterectomy - USG 4/10/17: 50-59% NICHELLE, 1-15% LICA.    • Colonic polyp    • DDD (degenerative disc disease), lumbosacral    • GERD (gastroesophageal reflux disease)    • H/O bone density study 2013   • H/O complete eye exam 2014   • HLD (hyperlipidemia)    • Hypertension    • Lipid screening 05/31/2013   • Low back pain     [facility]   • Screening for prostate cancer 07/07/2015   • Stroke (CMS/HCC)         Madhu Santoro 75 y.o. male who presents today for Medical Management of the below listed issues and medication refills.    ICD-10-CM ICD-9-CM   1. Benign essential hypertension  I10 401.1   2. Chronic bilateral low back pain without sciatica  M54.5 724.2    G89.29 338.29        he has a problem list of   Patient Active Problem List   Diagnosis   • Anxiety   • Arthritis   • Chronic coronary artery disease   • HLD (hyperlipidemia)   • Benign essential hypertension   • DDD (degenerative disc disease), lumbosacral   • Cerebrovascular accident (CMS/HCC)   • Encounter for screening for cardiovascular disorders   • Gastroesophageal reflux disease   • Hyperlipidemia   • Stenosis of carotid artery   • Former smoker   • History of cardiac catheterization   • S/P ablation of atrial flutter   • Typical atrial flutter (CMS/HCC)   • S/P CABG (coronary artery bypass graft)   • Adenomatous polyp of ascending colon   • Abdominal bloating   • Stroke (CMS/HCC)   • PAD (peripheral artery disease) (CMS/HCC)   • Acute cholecystitis   • Right upper quadrant abdominal pain   .    he has been compliant with   Current Outpatient Medications:   •  albuterol sulfate  (90 Base) MCG/ACT inhaler, Inhale 2 puffs Every 4 (Four) Hours As Needed for Wheezing., Disp: 1 inhaler, Rfl: 0  •  clopidogrel (PLAVIX) 75 MG tablet, Take 1 tablet by mouth Daily., Disp: 90 tablet, Rfl: 1  •  dicyclomine (BENTYL) 20 MG tablet, Take 1 tablet by mouth Every 6  "(Six) Hours As Needed (abdominal cramps)., Disp: 20 tablet, Rfl: 0  •  fluorouracil (EFUDEX) 5 % cream, , Disp: , Rfl:   •  HYDROcodone-acetaminophen (NORCO) 5-325 MG per tablet, Take 1 tablet by mouth Every 6 (Six) Hours As Needed for Mild Pain ., Disp: 60 tablet, Rfl: 0  •  Myrbetriq 25 MG tablet sustained-release 24 hour 24 hr tablet, , Disp: , Rfl:   •  nitroglycerin (NITROSTAT) 0.4 MG SL tablet, Place 1 tablet under the tongue Every 5 (Five) Minutes As Needed for Chest Pain. Take no more than 3 doses in 15 minutes., Disp: 20 tablet, Rfl: 2  •  ondansetron ODT (ZOFRAN-ODT) 4 MG disintegrating tablet, Take 1 tablet by mouth Every 6 (Six) Hours As Needed for Nausea or Vomiting., Disp: 12 tablet, Rfl: 0  •  pantoprazole (PROTONIX) 40 MG EC tablet, Take 1 tablet by mouth Daily., Disp: 90 tablet, Rfl: 1  •  ramipril (ALTACE) 5 MG capsule, Take 1 capsule by mouth Daily., Disp: 90 capsule, Rfl: 1  •  rosuvastatin (CRESTOR) 10 MG tablet, Take 1 tablet by mouth Daily., Disp: 90 tablet, Rfl: 1.  he denies medication side effects.        /63   Pulse 61   Temp 96.8 °F (36 °C)   Resp 16   Ht 172.7 cm (67.99\")   Wt 83.9 kg (185 lb)   SpO2 97%   BMI 28.14 kg/m²     Results for orders placed or performed in visit on 10/20/20   Comprehensive Metabolic Panel    Specimen: Blood   Result Value Ref Range    Glucose 106 (H) 65 - 99 mg/dL    BUN 11 8 - 23 mg/dL    Creatinine 0.99 0.76 - 1.27 mg/dL    eGFR Non African Am 74 >60 mL/min/1.73    eGFR African Am 89 >60 mL/min/1.73    BUN/Creatinine Ratio 11.1 7.0 - 25.0    Sodium 141 136 - 145 mmol/L    Potassium 5.4 (H) 3.5 - 5.2 mmol/L    Chloride 104 98 - 107 mmol/L    Total CO2 27.8 22.0 - 29.0 mmol/L    Calcium 9.3 8.6 - 10.5 mg/dL    Total Protein 6.6 6.0 - 8.5 g/dL    Albumin 4.00 3.50 - 5.20 g/dL    Globulin 2.6 gm/dL    A/G Ratio 1.5 g/dL    Total Bilirubin 0.8 0.0 - 1.2 mg/dL    Alkaline Phosphatase 100 39 - 117 U/L    AST (SGOT) 24 1 - 40 U/L    ALT (SGPT) 15 1 - 41 " U/L   Lipid Panel    Specimen: Blood   Result Value Ref Range    Total Cholesterol 135 0 - 200 mg/dL    Triglycerides 73 0 - 150 mg/dL    HDL Cholesterol 47 40 - 60 mg/dL    VLDL Cholesterol Bakari 15 5 - 40 mg/dL    LDL Chol Calc (NIH) 73 0 - 100 mg/dL   TSH    Specimen: Blood   Result Value Ref Range    TSH 3.170 0.270 - 4.200 uIU/mL   CBC & Differential    Specimen: Blood   Result Value Ref Range    WBC 6.39 3.40 - 10.80 10*3/mm3    RBC 6.24 (H) 4.14 - 5.80 10*6/mm3    Hemoglobin 16.9 13.0 - 17.7 g/dL    Hematocrit 54.3 (H) 37.5 - 51.0 %    MCV 87.0 79.0 - 97.0 fL    MCH 27.1 26.6 - 33.0 pg    MCHC 31.1 (L) 31.5 - 35.7 g/dL    RDW 14.5 12.3 - 15.4 %    Platelets 407 140 - 450 10*3/mm3    Neutrophil Rel % 62.4 42.7 - 76.0 %    Lymphocyte Rel % 20.8 19.6 - 45.3 %    Monocyte Rel % 13.8 (H) 5.0 - 12.0 %    Eosinophil Rel % 1.6 0.3 - 6.2 %    Basophil Rel % 0.5 0.0 - 1.5 %    Neutrophils Absolute 3.99 1.70 - 7.00 10*3/mm3    Lymphocytes Absolute 1.33 0.70 - 3.10 10*3/mm3    Monocytes Absolute 0.88 0.10 - 0.90 10*3/mm3    Eosinophils Absolute 0.10 0.00 - 0.40 10*3/mm3    Basophils Absolute 0.03 0.00 - 0.20 10*3/mm3    Immature Granulocyte Rel % 0.9 (H) 0.0 - 0.5 %    Immature Grans Absolute 0.06 (H) 0.00 - 0.05 10*3/mm3    nRBC 0.0 0.0 - 0.2 /100 WBC       The following portions of the patient's history were reviewed and updated as appropriate: allergies, current medications, past family history, past medical history, past social history, past surgical history and problem list.      he has a history of   Patient Active Problem List   Diagnosis   • Anxiety   • Arthritis   • Chronic coronary artery disease   • HLD (hyperlipidemia)   • Benign essential hypertension   • DDD (degenerative disc disease), lumbosacral   • Cerebrovascular accident (CMS/HCC)   • Encounter for screening for cardiovascular disorders   • Gastroesophageal reflux disease   • Hyperlipidemia   • Stenosis of carotid artery   • Former smoker   • History of  cardiac catheterization   • S/P ablation of atrial flutter   • Typical atrial flutter (CMS/HCC)   • S/P CABG (coronary artery bypass graft)   • Adenomatous polyp of ascending colon   • Abdominal bloating   • Stroke (CMS/HCC)   • PAD (peripheral artery disease) (CMS/HCC)   • Acute cholecystitis   • Right upper quadrant abdominal pain       Review of Systems   Constitutional: Negative for fatigue.   Genitourinary: Negative for difficulty urinating.   Musculoskeletal: Positive for arthralgias and back pain.   Psychiatric/Behavioral: The patient is nervous/anxious.        Objective   Physical Exam  Eyes:      Pupils: Pupils are equal, round, and reactive to light.   Neck:      Musculoskeletal: Normal range of motion.   Cardiovascular:      Rate and Rhythm: Normal rate and regular rhythm.   Pulmonary:      Effort: No respiratory distress.      Breath sounds: No rales.   Abdominal:      Palpations: Abdomen is soft.      Tenderness: There is no abdominal tenderness.   Musculoskeletal:         General: Tenderness present.   Neurological:      General: No focal deficit present.      Mental Status: He is alert.      Sensory: No sensory deficit.      Motor: No weakness.      Comments: Number to stay with the same meds and refer him to a pain doctor.   Psychiatric:         Mood and Affect: Mood normal.         Assessment/Plan   Diagnoses and all orders for this visit:    1. Benign essential hypertension (Primary)    2. Chronic bilateral low back pain without sciatica  -     Ambulatory Referral to Pain Management      Labs results discussed in detail with the patient, if no recent labs were done, order placed today.  Plan to update vaccines if needed today.  I  reviewed health maintenance with the patient as part of my preventative care plan. I discussed preventative counseling with the patient in detail.

## 2021-02-12 ENCOUNTER — OFFICE VISIT (OUTPATIENT)
Dept: FAMILY MEDICINE CLINIC | Facility: CLINIC | Age: 76
End: 2021-02-12

## 2021-02-12 VITALS
HEIGHT: 60 IN | DIASTOLIC BLOOD PRESSURE: 70 MMHG | TEMPERATURE: 98 F | BODY MASS INDEX: 36.32 KG/M2 | RESPIRATION RATE: 16 BRPM | SYSTOLIC BLOOD PRESSURE: 194 MMHG | WEIGHT: 185 LBS | HEART RATE: 56 BPM | OXYGEN SATURATION: 97 %

## 2021-02-12 DIAGNOSIS — I25.83 CORONARY ARTERY DISEASE DUE TO LIPID RICH PLAQUE: ICD-10-CM

## 2021-02-12 DIAGNOSIS — I25.10 CORONARY ARTERY DISEASE DUE TO LIPID RICH PLAQUE: ICD-10-CM

## 2021-02-12 PROCEDURE — 99214 OFFICE O/P EST MOD 30 MIN: CPT | Performed by: FAMILY MEDICINE

## 2021-02-12 NOTE — PROGRESS NOTES
Subjective   Chief Complaint:   Chief Complaint   Patient presents with   • Hypertension       History of Present Illness Patient comes in today for hypertension. BP in the office was 194/70. He gives me BPs at home this week: 153/63, 175/78, 175/76, 172/73, 138/77, 164/80, 167/68. He is on Altace 5mg one daily at this time. He is followed by Dr. Wright and is s/p CABG x3 (Plavix, ASA). He is also complaining of chronic back pain that is worsening so I am going to refer to Dr. Meraz of pain management at [facility]. His pain is severe and could be contributing to his high blood pressure.    Plan:  1. Continue Altace 5mg one tablet daily.  2. Refer to Dr. Meraz of pain management at [facility].       Past Medical History:   Diagnosis Date   • Anxiety    • Arthritis    • Atrial flutter (CMS/HCC)     Status post cavotricuspid isthmus ablation by Dr. Gustafson on 4/18/17   • Mandel esophagus    • Benign essential hypertension    • CAD (coronary artery disease)     3 vessel CABG 4/11/17 by Dr. Cortez: ROSARIO-prox LAD, SVG-OM1, SVG-OM3   • Carotid artery disease (CMS/HCC)     Status post carotid endarterectomy - USG 4/10/17: 50-59% NICHELLE, 1-15% LICA.    • Colonic polyp    • DDD (degenerative disc disease), lumbosacral    • GERD (gastroesophageal reflux disease)    • H/O bone density study 2013   • H/O complete eye exam 2014   • HLD (hyperlipidemia)    • Hypertension    • Lipid screening 05/31/2013   • Low back pain     physical therapy [facility] 5-12-10   • Screening for prostate cancer 07/07/2015   • Stroke (CMS/HCC)         Madhu Santoro 75 y.o. male who presents today for Medical Management of the below listed issues and medication refills.    ICD-10-CM ICD-9-CM   1. Coronary artery disease due to lipid rich plaque  I25.10 414.00    I25.83 414.3        he has a problem list of   Patient Active Problem List   Diagnosis   • Anxiety   • Arthritis   • Chronic coronary artery disease   • HLD (hyperlipidemia)   •  Benign essential hypertension   • DDD (degenerative disc disease), lumbosacral   • Cerebrovascular accident (CMS/HCC)   • Encounter for screening for cardiovascular disorders   • Gastroesophageal reflux disease   • Hyperlipidemia   • Stenosis of carotid artery   • Former smoker   • History of cardiac catheterization   • S/P ablation of atrial flutter   • Typical atrial flutter (CMS/HCC)   • S/P CABG (coronary artery bypass graft)   • Adenomatous polyp of ascending colon   • Abdominal bloating   • Stroke (CMS/HCC)   • PAD (peripheral artery disease) (CMS/HCC)   • Acute cholecystitis   • Right upper quadrant abdominal pain   .    he has been compliant with   Current Outpatient Medications:   •  albuterol sulfate  (90 Base) MCG/ACT inhaler, Inhale 2 puffs Every 4 (Four) Hours As Needed for Wheezing., Disp: 1 inhaler, Rfl: 0  •  clopidogrel (PLAVIX) 75 MG tablet, Take 1 tablet by mouth Daily., Disp: 90 tablet, Rfl: 1  •  dicyclomine (BENTYL) 20 MG tablet, Take 1 tablet by mouth Every 6 (Six) Hours As Needed (abdominal cramps)., Disp: 20 tablet, Rfl: 0  •  fluorouracil (EFUDEX) 5 % cream, , Disp: , Rfl:   •  HYDROcodone-acetaminophen (NORCO) 5-325 MG per tablet, Take 1 tablet by mouth Every 6 (Six) Hours As Needed for Mild Pain ., Disp: 60 tablet, Rfl: 0  •  Myrbetriq 25 MG tablet sustained-release 24 hour 24 hr tablet, , Disp: , Rfl:   •  nitroglycerin (NITROSTAT) 0.4 MG SL tablet, Place 1 tablet under the tongue Every 5 (Five) Minutes As Needed for Chest Pain. Take no more than 3 doses in 15 minutes., Disp: 20 tablet, Rfl: 2  •  ondansetron ODT (ZOFRAN-ODT) 4 MG disintegrating tablet, Take 1 tablet by mouth Every 6 (Six) Hours As Needed for Nausea or Vomiting., Disp: 12 tablet, Rfl: 0  •  pantoprazole (PROTONIX) 40 MG EC tablet, Take 1 tablet by mouth Daily., Disp: 90 tablet, Rfl: 1  •  ramipril (ALTACE) 5 MG capsule, Take 1 capsule by mouth Daily., Disp: 90 capsule, Rfl: 1  •  rosuvastatin (CRESTOR) 10 MG  "tablet, Take 1 tablet by mouth Daily., Disp: 90 tablet, Rfl: 1.  he denies medication side effects.        BP (!) 194/70   Pulse 56   Temp 98 °F (36.7 °C)   Resp 16   Ht 68 cm (26.77\")   Wt 83.9 kg (185 lb)   SpO2 97%   .53 kg/m²     Results for orders placed or performed in visit on 10/20/20   Comprehensive Metabolic Panel    Specimen: Blood   Result Value Ref Range    Glucose 106 (H) 65 - 99 mg/dL    BUN 11 8 - 23 mg/dL    Creatinine 0.99 0.76 - 1.27 mg/dL    eGFR Non African Am 74 >60 mL/min/1.73    eGFR African Am 89 >60 mL/min/1.73    BUN/Creatinine Ratio 11.1 7.0 - 25.0    Sodium 141 136 - 145 mmol/L    Potassium 5.4 (H) 3.5 - 5.2 mmol/L    Chloride 104 98 - 107 mmol/L    Total CO2 27.8 22.0 - 29.0 mmol/L    Calcium 9.3 8.6 - 10.5 mg/dL    Total Protein 6.6 6.0 - 8.5 g/dL    Albumin 4.00 3.50 - 5.20 g/dL    Globulin 2.6 gm/dL    A/G Ratio 1.5 g/dL    Total Bilirubin 0.8 0.0 - 1.2 mg/dL    Alkaline Phosphatase 100 39 - 117 U/L    AST (SGOT) 24 1 - 40 U/L    ALT (SGPT) 15 1 - 41 U/L   Lipid Panel    Specimen: Blood   Result Value Ref Range    Total Cholesterol 135 0 - 200 mg/dL    Triglycerides 73 0 - 150 mg/dL    HDL Cholesterol 47 40 - 60 mg/dL    VLDL Cholesterol Bakari 15 5 - 40 mg/dL    LDL Chol Calc (NIH) 73 0 - 100 mg/dL   TSH    Specimen: Blood   Result Value Ref Range    TSH 3.170 0.270 - 4.200 uIU/mL   CBC & Differential    Specimen: Blood   Result Value Ref Range    WBC 6.39 3.40 - 10.80 10*3/mm3    RBC 6.24 (H) 4.14 - 5.80 10*6/mm3    Hemoglobin 16.9 13.0 - 17.7 g/dL    Hematocrit 54.3 (H) 37.5 - 51.0 %    MCV 87.0 79.0 - 97.0 fL    MCH 27.1 26.6 - 33.0 pg    MCHC 31.1 (L) 31.5 - 35.7 g/dL    RDW 14.5 12.3 - 15.4 %    Platelets 407 140 - 450 10*3/mm3    Neutrophil Rel % 62.4 42.7 - 76.0 %    Lymphocyte Rel % 20.8 19.6 - 45.3 %    Monocyte Rel % 13.8 (H) 5.0 - 12.0 %    Eosinophil Rel % 1.6 0.3 - 6.2 %    Basophil Rel % 0.5 0.0 - 1.5 %    Neutrophils Absolute 3.99 1.70 - 7.00 10*3/mm3    " Lymphocytes Absolute 1.33 0.70 - 3.10 10*3/mm3    Monocytes Absolute 0.88 0.10 - 0.90 10*3/mm3    Eosinophils Absolute 0.10 0.00 - 0.40 10*3/mm3    Basophils Absolute 0.03 0.00 - 0.20 10*3/mm3    Immature Granulocyte Rel % 0.9 (H) 0.0 - 0.5 %    Immature Grans Absolute 0.06 (H) 0.00 - 0.05 10*3/mm3    nRBC 0.0 0.0 - 0.2 /100 WBC       The following portions of the patient's history were reviewed and updated as appropriate: allergies, current medications, past family history, past medical history, past social history, past surgical history and problem list.      he has a history of   Patient Active Problem List   Diagnosis   • Anxiety   • Arthritis   • Chronic coronary artery disease   • HLD (hyperlipidemia)   • Benign essential hypertension   • DDD (degenerative disc disease), lumbosacral   • Cerebrovascular accident (CMS/HCC)   • Encounter for screening for cardiovascular disorders   • Gastroesophageal reflux disease   • Hyperlipidemia   • Stenosis of carotid artery   • Former smoker   • History of cardiac catheterization   • S/P ablation of atrial flutter   • Typical atrial flutter (CMS/HCC)   • S/P CABG (coronary artery bypass graft)   • Adenomatous polyp of ascending colon   • Abdominal bloating   • Stroke (CMS/HCC)   • PAD (peripheral artery disease) (CMS/HCC)   • Acute cholecystitis   • Right upper quadrant abdominal pain       Review of Systems   Constitutional: Negative for activity change, appetite change and unexpected weight change.   Eyes: Negative for visual disturbance.   Respiratory: Negative for chest tightness and shortness of breath.    Cardiovascular: Negative for chest pain and palpitations.   Musculoskeletal: Positive for back pain.   Skin: Negative for color change.   Neurological: Negative for syncope and speech difficulty.   Psychiatric/Behavioral: Negative for confusion and decreased concentration.   All other systems reviewed and are negative.      Objective   Physical Exam  Vitals signs  and nursing note reviewed.   Constitutional:       General: He is not in acute distress.     Appearance: He is well-developed. He is not ill-appearing, toxic-appearing or diaphoretic.   HENT:      Head: Normocephalic and atraumatic.   Eyes:      Pupils: Pupils are equal, round, and reactive to light.   Neck:      Musculoskeletal: Normal range of motion and neck supple.      Thyroid: No thyromegaly.   Cardiovascular:      Rate and Rhythm: Normal rate and regular rhythm.   Pulmonary:      Effort: Pulmonary effort is normal. No respiratory distress.      Breath sounds: Normal breath sounds. No wheezing.   Abdominal:      Palpations: Abdomen is soft.   Musculoskeletal:         General: No swelling, tenderness, deformity or signs of injury.      Right lower leg: No edema.      Left lower leg: No edema.      Comments: Limited ROM secondary to back pain. Pain radiates down LLE. Positive SLR on the left   Skin:     General: Skin is warm and dry.   Neurological:      Mental Status: He is alert and oriented to person, place, and time.   Psychiatric:         Mood and Affect: Mood normal.         Behavior: Behavior normal.         Thought Content: Thought content normal.         Judgment: Judgment normal.         Assessment/Plan   Diagnoses and all orders for this visit:    1. Coronary artery disease due to lipid rich plaque  -     ramipril (ALTACE) 5 MG capsule; Take 1 capsule by mouth Daily.  Dispense: 90 capsule; Refill: 1      Labs results discussed in detail with the patient, if no recent labs were done, order placed today.  Plan to update vaccines if needed today.  I  reviewed health maintenance with the patient as part of my preventative care plan. I discussed preventative counseling with the patient in detail.

## 2021-02-13 ENCOUNTER — TELEPHONE (OUTPATIENT)
Dept: FAMILY MEDICINE CLINIC | Facility: CLINIC | Age: 76
End: 2021-02-13

## 2021-02-13 RX ORDER — RAMIPRIL 5 MG/1
5 CAPSULE ORAL DAILY
Qty: 90 CAPSULE | Refills: 1 | Status: SHIPPED | OUTPATIENT
Start: 2021-02-13 | End: 2021-08-17 | Stop reason: SDUPTHER

## 2021-02-26 ENCOUNTER — TELEPHONE (OUTPATIENT)
Dept: FAMILY MEDICINE CLINIC | Facility: CLINIC | Age: 76
End: 2021-02-26

## 2021-02-26 NOTE — TELEPHONE ENCOUNTER
Caller: Madhu Santoro    Relationship to patient: Self    Best call back number: [phone]         Patient is needing:     PATIENT CALLED IN WANTING TO SPEAK WITH ONE OF THE NURSES-   I ASKED TO LEAVE A MESSAGE AND TO HAVE SOMEONE CALL HIM.    HE SAID HE WOULD CHECK BACK IN A WHILE AND STATED HE WAS GOING TO OR NEEDED TO GET HIS COVID TEST ?      PLEASE CALL PATIENT UPON SEEING THIS.

## 2021-02-26 NOTE — TELEPHONE ENCOUNTER
Patient called wanting to know if we received his Covid test results from [facility].  I told him that we had not and he will have to call them to get

## 2021-03-02 NOTE — PROGRESS NOTES
The patient has a pain history of the following:  Chronic back pain    Previous interventions that the patient has received include:   n/a    Pain medications include:  Hydrocodone-acetaminophen 5-325mg    Other conservative modalities which the patient reports using include:  Physical Therapy: no  Chiropractor: no  Massage Therapy: no  TENS: no  Neck or back surgery: no  Past pain management: no    Past Significant Surgical History:  None    HPI:       CHIEF COMPLAINT: Back Pain        Madhu Santoro is a 75 y.o. male referred here by Damian Chen MD. Madhu Santoro presents to the office for evaluation and treatment of Back Pain      Onset:  Many years   Inciting Event:  Wear and tear from hard work; was a   Location:  Across the low back  Pain: Pain described as sharp and stiffness. Located across the low back and does not radiate into the legs.  Severity:  Pain rated as a 5 /10.  Apportions pain as 100%  back pain and 0% extremity pain.  Symptoms have been constant.  Exacerbation:  Being still too long makes it worse.   Alleviation:  Hydrocodone helps.  Associated Symptoms:   He denies any new onset of bowel or bladder weakness, significant leg weakness or saddle anesthesia. Denies balance problems or lower extremity incoordination.  Ambulates: Without assistive device    He's been taking Hydrocodone-acetaminophen 5-325mg twice daily for quite sometime and recently asked to increase it to two in the morning and two at night.  It allows him to do his yard work at home during the day and he denies side effects from the medication.          Opioid Risk Tool:       Opioid Risk Tool:  Family history of substance abuse: Alcohol - 0  Illegal drug - 0  Prescription drugs - 0   Personal history of substance abuse: Alcohol - 0  Illegal drugs - 0  Prescription drugs - 0   Patient is between 16 and 45 years of age: 0   History of preadolescent sexual abuse: N/A   Psychological disease:  ADD/OCD/Bipolar/Schizophrenia - 0  Depression - 0           Opioid Risk: Low       Scorin - 3 = Low Risk    4 - 7 = Moderate Risk    8+ = High Risk           PEG Assessment   What number best describes your pain on average in the past week?4  What number best describes how, during the past week, pain has interfered with your enjoyment of life?4  What number best describes how, during the past week, pain has interfered with your general activity?  3        Current Outpatient Medications:   •  clopidogrel (PLAVIX) 75 MG tablet, Take 1 tablet by mouth Daily., Disp: 90 tablet, Rfl: 1  •  Myrbetriq 25 MG tablet sustained-release 24 hour 24 hr tablet, , Disp: , Rfl:   •  nitroglycerin (NITROSTAT) 0.4 MG SL tablet, Place 1 tablet under the tongue Every 5 (Five) Minutes As Needed for Chest Pain. Take no more than 3 doses in 15 minutes., Disp: 20 tablet, Rfl: 2  •  pantoprazole (PROTONIX) 40 MG EC tablet, Take 1 tablet by mouth Daily., Disp: 90 tablet, Rfl: 1  •  ramipril (ALTACE) 5 MG capsule, Take 1 capsule by mouth Daily., Disp: 90 capsule, Rfl: 1  •  rosuvastatin (CRESTOR) 10 MG tablet, Take 1 tablet by mouth Daily., Disp: 90 tablet, Rfl: 1  •  HYDROcodone-acetaminophen (NORCO)  MG per tablet, Take 1 tablet by mouth 2 (two) times a day. Take at least 6 hours apart, Disp: 60 tablet, Rfl: 0    The following portions of the patient's history were reviewed and updated as appropriate: allergies, current medications, past family history, past medical history, past social history, past surgical history and problem list.      REVIEW OF PERTINENT MEDICAL DATA    10/27/2020 Platelets 407 (10*3), Creatinine 0.99    Review of Systems   Constitutional: Negative for chills and fever.   Respiratory: Negative for shortness of breath.    Cardiovascular: Negative for chest pain.   Gastrointestinal: Negative for constipation, diarrhea, nausea and vomiting.   Genitourinary: Negative for difficulty urinating, dysuria and  "enuresis.   Musculoskeletal: Positive for back pain.   Neurological: Positive for numbness. Negative for dizziness, weakness, light-headedness and headaches.   Psychiatric/Behavioral: Negative for confusion, hallucinations, self-injury, sleep disturbance and suicidal ideas. The patient is not nervous/anxious.    All other systems reviewed and are negative.    I have reviewed and confirmed the accuracy of the ROS as documented by the MA/LPN/RN Cyndy Meraz MD      Vitals:    03/03/21 0750   BP: 161/77   Pulse: 66   Resp: 18   Temp: 97.1 °F (36.2 °C)   SpO2: 98%   Weight: 82.1 kg (181 lb)   Height: 172.7 cm (67.99\")   PainSc:   5   PainLoc: Back         Objective   Physical Exam  Vitals signs reviewed.   Cardiovascular:      Rate and Rhythm: Normal rate.   Pulmonary:      Effort: Pulmonary effort is normal. No respiratory distress.   Musculoskeletal:      Comments: Ambulation: Without assistive device, forward stooped  Lumbar Exam:  Appearance: Scoliotic curve absent and scarring absent  Palpated over lumbosacral paravertebral regions and transverse processes with positive tenderness appreciated, Bilateral.   Sacroiliac joints are not tender, bilateral.  Trochanteric bursa are not tender, Bilateral.  Straight leg raise is negative radiculopathy, Bilateral, but positive for back pain.  Michael Triny's test is negative sacroiliac pain, Bilateral.  Positive pain with internal rotation of bilateral hips.      Neurological:      Mental Status: He is alert.      Deep Tendon Reflexes:      Reflex Scores:       Patellar reflexes are 2+ on the right side and 2+ on the left side.  Psychiatric:         Mood and Affect: Mood normal.         Thought Content: Thought content normal.         Assessment/Plan   Diagnoses and all orders for this visit:    1. Chronic bilateral low back pain without sciatica (Primary)  -     XR Spine Lumbar 2 or 3 View; Future  -     XR Hips Bilateral With or Without Pelvis 2 View; Future  -     " Ambulatory Referral to Physical Therapy Evaluate and treat (Chronic low back pain and decreased hip range of motion)  -     HYDROcodone-acetaminophen (NORCO)  MG per tablet; Take 1 tablet by mouth 2 (two) times a day. Take at least 6 hours apart  Dispense: 60 tablet; Refill: 0    2. Chronic pain of both hips  -     XR Spine Lumbar 2 or 3 View; Future  -     XR Hips Bilateral With or Without Pelvis 2 View; Future  -     Ambulatory Referral to Physical Therapy Evaluate and treat (Chronic low back pain and decreased hip range of motion)  -     HYDROcodone-acetaminophen (NORCO)  MG per tablet; Take 1 tablet by mouth 2 (two) times a day. Take at least 6 hours apart  Dispense: 60 tablet; Refill: 0        - Baseline urine drug screen was obtained.  Routine UDS in office today as part of monitoring requirements for controlled substances.  The specimen was viewed and the immunoassay result reviewed and is positive for opioids.  This specimen will be sent to [facility] laboratory for confirmation.     - Pertinent labs reviewed.   - Lumbar and hip x-rays ordered today.   - Physical therapy referral placed.   - Briefly discussed Medial branch blocks or hip injections according to his imaging results.  He is cautious/concerned about injections due to knowing someone who got foot drop after a back injection.   - Opioid risk assessment performed showing low risk.   - Due to his chronic anti-platelet medication and his age, we are limited in medication options to treat his pain.  At this time, I feel the benefit of opioids outweighs his risk.  We will continue to reassess this balance.   - Controlled substance agreement signed today.    - I have agreed to increase his hydrocodone-acetaminophen to 10-325mg #60 per month.  Discussed medication with the patient.  Included in this discussion was the potential for side effects and adverse events.  Patient verbalized understanding and wished to proceed.  Prescription will be  sent to pharmacy.     --- Follow-up 4 weeks office visit           RUTH REPORT    As part of the patient's treatment plan, I am prescribing controlled substances. The patient has been made aware of appropriate use of such medications, including potential risk of somnolence, limited ability to drive and/or work safely, and the potential for dependence or overdose. It has also bee made clear that these medications are for use by this patient only, without concomitant use of alcohol or other substances unless prescribed.     Patient has completed prescribing agreement detailing terms of continued prescribing of controlled substances, including monitoring RUTH reports, urine drug screening, and pill counts if necessary. The patient is aware that inappropriate use will results in cessation of prescribing such medications.    As the clinician, I personally reviewed the RUTH from 3/3/21 while the patient was in the office today.    History and physical exam exhibit continued safe and appropriate use of controlled substances.         While examining this patient, I wore protective equipment including a mask, eye shield and gloves.  I washed my hands before and after this patient encounter.  The patient wore a mask throughout the visit as well.     Cyndy Meraz MD  Pain Management

## 2021-03-03 ENCOUNTER — OFFICE VISIT (OUTPATIENT)
Dept: PAIN MEDICINE | Facility: CLINIC | Age: 76
End: 2021-03-03

## 2021-03-03 ENCOUNTER — HOSPITAL ENCOUNTER (OUTPATIENT)
Dept: GENERAL RADIOLOGY | Facility: HOSPITAL | Age: 76
Discharge: HOME | End: 2021-03-03

## 2021-03-03 VITALS
OXYGEN SATURATION: 98 % | DIASTOLIC BLOOD PRESSURE: 77 MMHG | HEIGHT: 68 IN | BODY MASS INDEX: 27.43 KG/M2 | HEART RATE: 66 BPM | TEMPERATURE: 97.1 F | RESPIRATION RATE: 18 BRPM | WEIGHT: 181 LBS | SYSTOLIC BLOOD PRESSURE: 161 MMHG

## 2021-03-03 DIAGNOSIS — M25.552 CHRONIC PAIN OF BOTH HIPS: ICD-10-CM

## 2021-03-03 DIAGNOSIS — M54.50 CHRONIC BILATERAL LOW BACK PAIN WITHOUT SCIATICA: Primary | ICD-10-CM

## 2021-03-03 DIAGNOSIS — M25.551 CHRONIC PAIN OF BOTH HIPS: ICD-10-CM

## 2021-03-03 DIAGNOSIS — M54.50 CHRONIC BILATERAL LOW BACK PAIN WITHOUT SCIATICA: ICD-10-CM

## 2021-03-03 DIAGNOSIS — G89.29 CHRONIC PAIN OF BOTH HIPS: ICD-10-CM

## 2021-03-03 DIAGNOSIS — G89.29 CHRONIC BILATERAL LOW BACK PAIN WITHOUT SCIATICA: ICD-10-CM

## 2021-03-03 DIAGNOSIS — G89.29 CHRONIC BILATERAL LOW BACK PAIN WITHOUT SCIATICA: Primary | ICD-10-CM

## 2021-03-03 LAB
POC AMPHETAMINES: NEGATIVE
POC BARBITURATES: NEGATIVE
POC BENZODIAZEPHINES: NEGATIVE
POC COCAINE: NEGATIVE
POC METHADONE: NEGATIVE
POC METHAMPHETAMINE SCREEN URINE: NEGATIVE
POC OPIATES: POSITIVE
POC OXYCODONE: NEGATIVE
POC PHENCYCLIDINE: NEGATIVE
POC PROPOXYPHENE: NEGATIVE
POC THC: NEGATIVE
POC TRICYCLIC ANTIDEPRESSANTS: NEGATIVE

## 2021-03-03 PROCEDURE — 73521 X-RAY EXAM HIPS BI 2 VIEWS: CPT

## 2021-03-03 PROCEDURE — 80305 DRUG TEST PRSMV DIR OPT OBS: CPT | Performed by: ANESTHESIOLOGY

## 2021-03-03 PROCEDURE — 99204 OFFICE O/P NEW MOD 45 MIN: CPT | Performed by: ANESTHESIOLOGY

## 2021-03-03 PROCEDURE — 72100 X-RAY EXAM L-S SPINE 2/3 VWS: CPT

## 2021-03-03 RX ORDER — HYDROCODONE BITARTRATE AND ACETAMINOPHEN 10; 325 MG/1; MG/1
1 TABLET ORAL 2 TIMES DAILY
Qty: 60 TABLET | Refills: 0 | Status: SHIPPED | OUTPATIENT
Start: 2021-03-03 | End: 2021-03-31 | Stop reason: SDUPTHER

## 2021-03-10 ENCOUNTER — HOSPITAL ENCOUNTER (OUTPATIENT)
Dept: PHYSICAL THERAPY | Facility: HOSPITAL | Age: 76
Setting detail: THERAPIES SERIES
Discharge: HOME | End: 2021-03-10

## 2021-03-10 DIAGNOSIS — M25.551 HIP PAIN, BILATERAL: ICD-10-CM

## 2021-03-10 DIAGNOSIS — M25.552 HIP PAIN, BILATERAL: ICD-10-CM

## 2021-03-10 DIAGNOSIS — G89.29 CHRONIC BILATERAL LOW BACK PAIN WITHOUT SCIATICA: Primary | ICD-10-CM

## 2021-03-10 DIAGNOSIS — M25.60 DECREASED RANGE OF MOTION: ICD-10-CM

## 2021-03-10 DIAGNOSIS — R29.898 WEAKNESS OF BOTH LOWER EXTREMITIES: ICD-10-CM

## 2021-03-10 DIAGNOSIS — M54.50 CHRONIC BILATERAL LOW BACK PAIN WITHOUT SCIATICA: Primary | ICD-10-CM

## 2021-03-10 PROCEDURE — 97110 THERAPEUTIC EXERCISES: CPT

## 2021-03-10 PROCEDURE — 97161 PT EVAL LOW COMPLEX 20 MIN: CPT

## 2021-03-11 NOTE — THERAPY EVALUATION
Outpatient Physical Therapy Ortho Initial Evaluation  [facility]     Patient Name: Madhu Santoro  : 1945  MRN: [id number]  Today's Date: 3/10/2021      Visit Date: 03/10/2021    Patient Active Problem List   Diagnosis   • Anxiety   • Arthritis   • Chronic coronary artery disease   • HLD (hyperlipidemia)   • Benign essential hypertension   • DDD (degenerative disc disease), lumbosacral   • Cerebrovascular accident (CMS/HCC)   • Encounter for screening for cardiovascular disorders   • Gastroesophageal reflux disease   • Hyperlipidemia   • Stenosis of carotid artery   • Former smoker   • History of cardiac catheterization   • S/P ablation of atrial flutter   • Typical atrial flutter (CMS/HCC)   • S/P CABG (coronary artery bypass graft)   • Adenomatous polyp of ascending colon   • Abdominal bloating   • Stroke (CMS/HCC)   • PAD (peripheral artery disease) (CMS/HCC)   • Acute cholecystitis   • Right upper quadrant abdominal pain        Past Medical History:   Diagnosis Date   • Anxiety    • Arthritis    • Atrial flutter (CMS/HCC)     Status post cavotricuspid isthmus ablation by Dr. Gustafson on 17   • Mandel esophagus    • Benign essential hypertension    • CAD (coronary artery disease)     3 vessel CABG 17 by Dr. Cortez: ROSARIO-prox LAD, SVG-OM1, SVG-OM3   • Carotid artery disease (CMS/HCC)     Status post carotid endarterectomy - USG 4/10/17: 50-59% NICHELLE, 1-15% LICA.    • Colonic polyp    • DDD (degenerative disc disease), lumbosacral    • GERD (gastroesophageal reflux disease)    • H/O bone density study    • H/O complete eye exam    • HLD (hyperlipidemia)    • Hypertension    • Lipid screening 2013   • Low back pain     physical therapy [facility] rehab 5-12-10   • Screening for prostate cancer 2015   • Stroke (CMS/HCC)         Past Surgical History:   Procedure Laterality Date   • CARDIAC CATHETERIZATION N/A 4/10/2017    Procedure: Left Heart Cath;  Surgeon: Marjorie  MD Monet;  [facility];  Service:    • CARDIAC CATHETERIZATION N/A 4/10/2017    Procedure: Coronary angiography;  Surgeon: Marjorie Healy MD;  [facility];  Service:    • CARDIAC CATHETERIZATION N/A 4/10/2017    Procedure: Left ventriculography;  Surgeon: Marjorie Healy MD;  [facility];  Service:    • CARDIAC ELECTROPHYSIOLOGY PROCEDURE N/A 4/18/2017    Procedure: Ablation atrial flutter;  Surgeon: Jose Antonio Gustafson MD;  [facility];  Service:    • CHOLECYSTECTOMY     • CHOLECYSTECTOMY WITH INTRAOPERATIVE CHOLANGIOGRAM N/A 9/7/2019    Procedure: Laparoscopic cholecystectomy with intraoperative cholangiogram;  Surgeon: Gauri Travis MD;  Location: [facility] MAIN OR;  Service: General   • COLONOSCOPY  01/06/2015    Diverticulosis, one TA   • COLONOSCOPY N/A 2/14/2019    tics, NBIH, adenomatous polyp x 2   • CORONARY ARTERY BYPASS GRAFT N/A 4/11/2017    Procedure: AR STERNOTOMY CORONARY ARTERY BYPASS GRAFT TIMES 3 USING LEFT INTERNAL MAMMARY ARTERY AND LEFT GREATER SAPHENOUS VEIN GRAFT PER ENDOSCOPIC VEIN HARVESTING AND PRP ;  Surgeon: Temo Cortez MD;  Location: [facility] MAIN OR;  Service:    • ENDOSCOPY  01/06/2015    HH, Mandel's esophagus   • ENDOSCOPY N/A 2/14/2019    Z line irregular, HH, Mandel's esophagus   • VASECTOMY         Visit Dx:     ICD-10-CM ICD-9-CM   1. Chronic bilateral low back pain without sciatica  M54.5 724.2    G89.29 338.29   2. Weakness of both lower extremities  R29.898 729.89   3. Decreased range of motion  M25.60 719.50   4. Hip pain, bilateral  M25.551 719.45    M25.552          Patient History     Row Name 03/10/21 1100             History    Chief Complaint  Pain;Difficulty Walking;Difficulty with daily activities  -JA      Type of Pain  Back pain;Hip pain  -JA      Date Current Problem(s) Began  -- chronic, recently felt he needed more pain med due to pain  -JA    "   Brief Description of Current Complaint  \"Back hurts all the time. The older I get if I have sat a while it takes a few steps before I straighten up. If I get down to the floor I definitely need to grab onto something to get up or I won't be able to.\"  Pt has had LBP for a long time.  Was told he had degenerative \"something\".  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 Years of heavy lifting .  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  States numbness underside of toes bilaterally - was told it was neuropathy.   Gets calf pain on R when he walks.  Dr Netta Mayorga told him to keep going with his walks.  States he walks twice a day.  -WILL      Patient/Caregiver Goals  Relieve pain;Improve mobility  -WILL      Occupation/sports/leisure activities  retired 14 years; walks every day with wife 4 blocks a couple imes a day.  -WILL      Results of Clinical Tests  [facility]  -         Pain     Pain Location  Back;Hip  -      Pain at Present  5  -JA      Pain at Best  5  -JA      Pain at Worst  9  -JA      Pain Frequency  Constant/continuous  -WILL      What Performance Factors Make the Current Problem(s) WORSE?  getting up after sitting a while;   -JA      Pain Comments  across low back  -WILL      Is your sleep disturbed?  Yes  -WILL      What position do you sleep in?  Right sidelying;Left sidelying toss/turn  -WILL        User Key  (r) = Recorded By, (t) = Taken By, (c) = Cosigned By    Initials Name Provider Type    Betty Garnett, PT Physical Therapist          PT Ortho     Row Name 03/10/21 1600       Neural Tension Signs- Lower Quarter Clearing    SLR  -- thony weakness  -JA    Row Name 03/10/21 1100       Quarter Clearing    Quarter Clearing  Lower Quarter Clearing  -JA       Myotomal Screen- Lower Quarter Clearing    Hip flexion (L2)  Left:;3- (Fair -);Right:;3 (Fair)  -WILL "    Knee extension (L3)  Left:;3 (Fair);Right:;3+ (Fair +)  -JA    Ankle DF (L4)  Left:;3- (Fair -);Right:;3 (Fair)  -JA    Ankle PF (S1)  Left:;3- (Fair -);Right:;3 (Fair)  -JA    Knee flexion (S2)  Left:;3- (Fair -);Right:;3 (Fair)  -JA       Lumbar ROM Screen- Lower Quarter Clearing    Lumbar Flexion  Impaired 30% w/inc pain  -JA    Lumbar Extension  Impaired 10% and incr pain  -JA    Lumbar Lateral Flexion  Impaired R 50% w/pain, L 25% w/pain  -JA    Lumbar Rotation  Impaired <25% thony and thony pain Lrot >R  -JA       Transfers    Transfer, Impairments  strength decreased;impaired balance  -JA    Comment (Transfers)  difficulty with STS, supine<->sit; very slow   -JA       Gait/Stairs (Locomotion)    Bilateral Gait Deviations  forward flexed posture;heel strike decreased;weight shift ability decreased knees slightly flexed  -JA      User Key  (r) = Recorded By, (t) = Taken By, (c) = Cosigned By    Initials Name Provider Type    Betty Garnett, PT Physical Therapist                      Therapy Education  Education Details: Access Code: [email or web address]  Given: HEP, Symptoms/condition management, Pain management  Program: New  How Provided: Verbal, Demonstration, Written  Provided to: Patient  Level of Understanding: Teach back education performed, Verbalized, Demonstrated     PT OP Goals     Row Name 03/10/21 1600          PT Short Term Goals    STG Date to Achieve  04/11/21  -WILL     STG 1  Patient will be independent and compliant with initial HEP.  -WILL     STG 1 Progress  New  -     STG 2  Patient will demonstrate correct posture in sitting and standing to decrease strain/pain on spine.  -WILL     STG 2 Progress  New  -WILL     STG 3  Pt will tolerate 5 minutes of hooklying or supine position to improve ability to perform therapeutic exercise.  -WILL     STG 3 Progress  New  -WILL        Long Term Goals    LTG Date to Achieve  05/11/21  -WILL     LTG 1  Pt will be independent with advanced HEP for spinal stabilization  and LE/core strengthening.  -JA     LTG 1 Progress  New  -JA     LTG 2  Pt will demonstrate correct body mechanics with bending, reaching, and lifting ADL’s.  -JA     LTG 2 Progress  New  -JA     LTG 3  Pt will score 50% or less on Oswestry Disability Index indicating decrease in perceived functional disability.  -JA     LTG 3 Progress  New  -JA     LTG 4  Pt will report 25% decrease in pain.  -WILL     LTG 4 Progress  New  -JA        Time Calculation    PT Goal Re-Cert Due Date  06/11/21  -WILL       User Key  (r) = Recorded By, (t) = Taken By, (c) = Cosigned By    Initials Name Provider Type    Betty Garnett, PT Physical Therapist          PT Assessment/Plan     Row Name 03/10/21 1600          PT Assessment    Functional Limitations  Decreased safety during functional activities;Impaired gait;Limitation in home management;Limitations in community activities;Performance in leisure activities;Performance in self-care ADL  -WILL     Impairments  Pain;Muscle strength;Range of motion;Posture;Poor body mechanics;Gait;Endurance;Balance  -WILL     Assessment Comments  Madhu Santoro is a pleasant 75 y.o. male referred to outpatient physical therapy for evaluation and treatment of chronic bilateral low back and hip pain.  Patient presents with c/o pain that gradually increased particularly when waking up in the morning and at night when going to sleep.  He demonstrates decreased trunk ROM, thony LE weakness, poor posture (forward flexed posture, knees with mild flexion-hamstring contractures?, lateral weight shift), difficulty with transitional movements,  severely limited tolerance to standing and supine or hooklying positions during evaluation (2 minutes only).  Signs and symptoms are consistent with referring diagnosis.  Due to significant deconditioning and poor tolerance to some positions he is a good candidate for aquatic therapy however he is not receptive at this time.  Pertinent comorbidities and personal factors  that may affect progress include, but are not limited to, poor body awareness, chronicity of issue.  This condition is evolving. Recommend skilled PT to address functional deficits. Thank you for this referral.  Due to poor tolerance to activity we will begin 1x/wk and initially.  -WILL     Please refer to paper survey for additional self-reported information  Yes  -JA     Rehab Potential  Fair  -WILL     Patient/caregiver participated in establishment of treatment plan and goals  Yes  -WILL     Patient would benefit from skilled therapy intervention  Yes  -WILL        PT Plan    PT Frequency  1x/week;2x/week  -WILL     Predicted Duration of Therapy Intervention (PT)  8-12 visits  -WILL     Planned CPT's?  PT EVAL LOW COMPLEXITY: 76470;PT RE-EVAL: 06503;PT THER PROC EA 15 MIN: 90593;PT THER ACT EA 15 MIN: 29349;PT MANUAL THERAPY EA 15 MIN: 89277;PT NEUROMUSC RE-EDUCATION EA 15 MIN: 28754;PT GAIT TRAINING EA 15 MIN: 69345;PT AQUATIC THERAPY EA 15 MIN: 69261;PT SELF CARE/HOME MGMT/TRAIN EA 15: 33244;PT HOT OR COLD PACK TREAT MCARE  -WILL     PT Plan Comments  review initial HEP,   -WILL       User Key  (r) = Recorded By, (t) = Taken By, (c) = Cosigned By    Initials Name Provider Type    Betty Garnett, PT Physical Therapist            OP Exercises     Row Name 03/10/21 1600             Subjective Comments    Subjective Comments  initial eval  -         Total Minutes    62420 - PT Therapeutic Exercise Minutes  15  -JA         Exercise 1    Exercise Name 1  Pt had difficulty tolerating any position for ther ex today.  Discussed possiblity of aquatic therapy however pt not receptive.  Attempted SKC but too painful.  Poor tolerance inn standing during eval even brief periods 1-2 minutes, could not tolerate HL for more than 1-2 minutes. Required frequent breaks.  -WILL         Exercise 2    Exercise Name 2  supine Ankle pumps  -WILL      Reps 2  10 ea  -WILL         Exercise 3    Exercise Name 3  LTR in small range  -WILL      Cueing 3   Verbal;Tactile  -JA      Reps 3  5  -JA      Time 3  3 sec  -JA      Additional Comments  11-12-1 o'clock  -JA         Exercise 4    Exercise Name 4  supine heel slide  -JA      Cueing 4  Verbal;Tactile  -JA      Reps 4  5  -JA      Time 4  3sec  -JA      Additional Comments  difficulty/pain with 5  -JA         Exercise 5    Exercise Name 5  PPT   -JA      Cueing 5  Verbal;Tactile  -JA      Reps 5  5  -JA      Time 5  3sec  -JA      Additional Comments  will need reinforcement  -JA        User Key  (r) = Recorded By, (t) = Taken By, (c) = Cosigned By    Initials Name Provider Type    Betty Garnett, PT Physical Therapist                        Outcome Measure Options: Modifed Owestry  Modified Oswestry  Modified Oswestry Score/Comments: 60%      Time Calculation:     Start Time: 1100  Stop Time: 1145  Time Calculation (min): 45 min     Therapy Charges for Today     Code Description Service Date Service Provider Modifiers Qty    21540132839 HC PT THER PROC EA 15 MIN 3/10/2021 Betty Valdez, PT GP 1    [id number]  PT EVAL LOW COMPLEXITY 2 3/10/2021 Betty Valdez, PT GP 1          PT G-Codes  Outcome Measure Options: Modifed Owestry  Modified Oswestry Score/Comments: 60%         Betty Valdez PT  3/10/2021

## 2021-03-18 ENCOUNTER — HOSPITAL ENCOUNTER (OUTPATIENT)
Dept: PHYSICAL THERAPY | Facility: HOSPITAL | Age: 76
Setting detail: THERAPIES SERIES
Discharge: HOME | End: 2021-03-18

## 2021-03-18 DIAGNOSIS — M25.60 DECREASED RANGE OF MOTION: ICD-10-CM

## 2021-03-18 DIAGNOSIS — R29.898 WEAKNESS OF BOTH LOWER EXTREMITIES: ICD-10-CM

## 2021-03-18 DIAGNOSIS — M25.552 HIP PAIN, BILATERAL: ICD-10-CM

## 2021-03-18 DIAGNOSIS — M54.50 CHRONIC BILATERAL LOW BACK PAIN WITHOUT SCIATICA: Primary | ICD-10-CM

## 2021-03-18 DIAGNOSIS — M25.551 HIP PAIN, BILATERAL: ICD-10-CM

## 2021-03-18 DIAGNOSIS — G89.29 CHRONIC BILATERAL LOW BACK PAIN WITHOUT SCIATICA: Primary | ICD-10-CM

## 2021-03-18 PROCEDURE — 97110 THERAPEUTIC EXERCISES: CPT

## 2021-03-18 NOTE — THERAPY TREATMENT NOTE
Outpatient Physical Therapy Ortho Treatment Note  [facility]     Patient Name: Madhu Santoro  : 1945  MRN: [id number]  Today's Date: 3/18/2021      Visit Date: 2021    Visit Dx:    ICD-10-CM ICD-9-CM   1. Chronic bilateral low back pain without sciatica  M54.5 724.2    G89.29 338.29   2. Weakness of both lower extremities  R29.898 729.89   3. Decreased range of motion  M25.60 719.50   4. Hip pain, bilateral  M25.551 719.45    M25.552        Patient Active Problem List   Diagnosis   • Anxiety   • Arthritis   • Chronic coronary artery disease   • HLD (hyperlipidemia)   • Benign essential hypertension   • DDD (degenerative disc disease), lumbosacral   • Cerebrovascular accident (CMS/HCC)   • Encounter for screening for cardiovascular disorders   • Gastroesophageal reflux disease   • Hyperlipidemia   • Stenosis of carotid artery   • Former smoker   • History of cardiac catheterization   • S/P ablation of atrial flutter   • Typical atrial flutter (CMS/HCC)   • S/P CABG (coronary artery bypass graft)   • Adenomatous polyp of ascending colon   • Abdominal bloating   • Stroke (CMS/HCC)   • PAD (peripheral artery disease) (CMS/HCC)   • Acute cholecystitis   • Right upper quadrant abdominal pain        Past Medical History:   Diagnosis Date   • Anxiety    • Arthritis    • Atrial flutter (CMS/HCC)     Status post cavotricuspid isthmus ablation by Dr. Gustafson on 17   • Mandel esophagus    • Benign essential hypertension    • CAD (coronary artery disease)     3 vessel CABG 17 by Dr. Cortez: ROSARIO-prox LAD, SVG-OM1, SVG-OM3   • Carotid artery disease (CMS/HCC)     Status post carotid endarterectomy - USG 4/10/17: 50-59% NICHELLE, 1-15% LICA.    • Colonic polyp    • DDD (degenerative disc disease), lumbosacral    • GERD (gastroesophageal reflux disease)    • H/O bone density study    • H/O complete eye exam    • HLD (hyperlipidemia)    • Hypertension    • Lipid screening 2013   • Low  back pain     physical therapy [facility] rehab 5-12-10   • Screening for prostate cancer 07/07/2015   • Stroke (CMS/HCC)         Past Surgical History:   Procedure Laterality Date   • CARDIAC CATHETERIZATION N/A 4/10/2017    Procedure: Left Heart Cath;  Surgeon: Marjorie Healy MD;  Location[facility];  Service:    • CARDIAC CATHETERIZATION N/A 4/10/2017    Procedure: Coronary angiography;  Surgeon: Marjorie Healy MD;  Location[facility] LOC[facility];  Service:    • CARDIAC CATHETERIZATION N/A 4/10/2017    Procedure: Left ventriculography;  Surgeon: Marjorie Healy MD;  Location[facility] LOC[facility];  Service:    • CARDIAC ELECTROPHYSIOLOGY PROCEDURE N/A 4/18/2017    Procedure: Ablation atrial flutter;  Surgeon: Jose Antonio Gustafson MD;  Location: [facility];  Service:    • CHOLECYSTECTOMY     • CHOLECYSTECTOMY WITH INTRAOPERATIVE CHOLANGIOGRAM N/A 9/7/2019    Procedure: Laparoscopic cholecystectomy with intraoperative cholangiogram;  Surgeon: Gauri Travis MD;  Location: [facility] OR;  Service: General   • COLONOSCOPY  01/06/2015    Diverticulosis, one TA   • COLONOSCOPY N/A 2/14/2019    tics, NBIH, adenomatous polyp x 2   • CORONARY ARTERY BYPASS GRAFT N/A 4/11/2017    Procedure: AR STERNOTOMY CORONARY ARTERY BYPASS GRAFT TIMES 3 USING LEFT INTERNAL MAMMARY ARTERY AND LEFT GREATER SAPHENOUS VEIN GRAFT PER ENDOSCOPIC VEIN HARVESTING AND PRP ;  Surgeon: Temo Cortez MD;  Location: [facility];  Service:    • ENDOSCOPY  01/06/2015    HH, Mandel's esophagus   • ENDOSCOPY N/A 2/14/2019    Z line irregular, HH, Mandel's esophagus   • VASECTOMY                         PT Assessment/Plan     Row Name 03/18/21 1013          PT Assessment    Assessment Comments  Patient ambulating with decreased step length w/ flexed hips and knees. Next visit assess HEP. Difficulty with PPT  -WS       User Key  (r) = Recorded By, (t) = Taken By, (c) = Cosigned By     Initials Name Provider Type    WS Lisandro Elizabeth PTA Physical Therapy Assistant            OP Exercises     Row Name 03/18/21 0942             Subjective Comments    Subjective Comments  doing exercises at home  -WS         Subjective Pain    Able to rate subjective pain?  yes  -WS      Pre-Treatment Pain Level  6  -WS      Subjective Pain Comment  took pain pill  -WS         Total Minutes    84500 - PT Therapeutic Exercise Minutes  30  -WS         Exercise 1    Exercise Name 1  trial 90/90  -WS      Additional Comments  increased pain  -WS         Exercise 2    Exercise Name 2  supine Ankle pumps  -WS      Reps 2  10 ea  -WS         Exercise 3    Exercise Name 3  LTR in small range  -WS      Cueing 3  Verbal;Tactile  -WS      Reps 3  5  -WS      Time 3  3 sec  -WS      Additional Comments  11-1 oclock  -WS         Exercise 4    Exercise Name 4  supine heel slide  -WS      Cueing 4  Verbal;Tactile  -WS      Reps 4  5  -WS      Time 4  3sec  -WS         Exercise 5    Exercise Name 5  PPT   -WS      Cueing 5  Verbal;Tactile  -WS      Reps 5  5  -WS      Time 5  3sec  -WS         Exercise 6    Exercise Name 6  mini squat  -WS      Reps 6  10  -WS         Exercise 7    Exercise Name 7  standing MIP  -WS      Reps 7  10  -WS         Exercise 8    Exercise Name 8  sidestep  -WS      Reps 8  x 3  -WS         Exercise 9    Exercise Name 9  LAQ thony  -WS      Reps 9  10  -WS         Exercise 10    Exercise Name 10  seated HS stretch  -WS      Reps 10  3  -WS      Time 10  20  -WS        User Key  (r) = Recorded By, (t) = Taken By, (c) = Cosigned By    Initials Name Provider Type     Lisandro Elizabeth PTA Physical Therapy Assistant                       PT OP Goals     Row Name 03/18/21 1000          PT Short Term Goals    STG Date to Achieve  04/11/21  -WS     STG 1  Patient will be independent and compliant with initial HEP.  -WS     STG 1 Progress  Ongoing  -WS     STG 2  Patient will demonstrate correct posture in  sitting and standing to decrease strain/pain on spine.  -     STG 2 Progress  Ongoing  -     STG 3  Pt will tolerate 5 minutes of hooklying or supine position to improve ability to perform therapeutic exercise.  -     STG 3 Progress  Ongoing  -        Long Term Goals    LTG Date to Achieve  05/11/21  -WS     LTG 1  Pt will be independent with advanced HEP for spinal stabilization and LE/core strengthening.  -WS     LTG 1 Progress  New  -     LTG 2  Pt will demonstrate correct body mechanics with bending, reaching, and lifting ADL’s.  -WS     LTG 2 Progress  New  -WS     LTG 3  Pt will score 50% or less on Oswestry Disability Index indicating decrease in perceived functional disability.  -WS     LTG 3 Progress  New  -WS     LTG 4  Pt will report 25% decrease in pain.  -     LTG 4 Progress  New  -       User Key  (r) = Recorded By, (t) = Taken By, (c) = Cosigned By    Initials Name Provider Type    Lisandro Martinez PTA Physical Therapy Assistant          Therapy Education  Given: HEP, Posture/body mechanics, Symptoms/condition management  Program: Reinforced, New  How Provided: Verbal, Demonstration  Provided to: Patient  Level of Understanding: Teach back education performed              Time Calculation:   Start Time: 0945  Stop Time: 1015  Time Calculation (min): 30 min  Therapy Charges for Today     Code Description Service Date Service Provider Modifiers Qty    19973795127 HC PT THER PROC EA 15 MIN 3/18/2021 Lisandro Elizabeth PTA GP 2                    Lisandro Elizabeth PTA  3/18/2021

## 2021-03-24 ENCOUNTER — TELEPHONE (OUTPATIENT)
Dept: PHYSICAL THERAPY | Facility: HOSPITAL | Age: 76
End: 2021-03-24

## 2021-03-24 NOTE — TELEPHONE ENCOUNTER
Spoke with pt about missed appt this morning--pt said he had usually received reminder calls about the appts.  I confirmed his next 2 appts and he stated he would attend.

## 2021-03-30 ENCOUNTER — APPOINTMENT (OUTPATIENT)
Dept: PHYSICAL THERAPY | Facility: HOSPITAL | Age: 76
End: 2021-03-30

## 2021-03-31 ENCOUNTER — OFFICE VISIT (OUTPATIENT)
Dept: PAIN MEDICINE | Facility: CLINIC | Age: 76
End: 2021-03-31

## 2021-03-31 VITALS
WEIGHT: 180 LBS | HEART RATE: 52 BPM | RESPIRATION RATE: 18 BRPM | TEMPERATURE: 96.9 F | SYSTOLIC BLOOD PRESSURE: 168 MMHG | DIASTOLIC BLOOD PRESSURE: 94 MMHG | HEIGHT: 68 IN | BODY MASS INDEX: 27.28 KG/M2 | OXYGEN SATURATION: 97 %

## 2021-03-31 DIAGNOSIS — M54.50 CHRONIC BILATERAL LOW BACK PAIN WITHOUT SCIATICA: ICD-10-CM

## 2021-03-31 DIAGNOSIS — G89.29 CHRONIC PAIN OF BOTH HIPS: ICD-10-CM

## 2021-03-31 DIAGNOSIS — M25.551 CHRONIC PAIN OF BOTH HIPS: ICD-10-CM

## 2021-03-31 DIAGNOSIS — Z79.899 ENCOUNTER FOR LONG-TERM (CURRENT) USE OF HIGH-RISK MEDICATION: ICD-10-CM

## 2021-03-31 DIAGNOSIS — G89.29 CHRONIC BILATERAL LOW BACK PAIN WITHOUT SCIATICA: ICD-10-CM

## 2021-03-31 DIAGNOSIS — M25.552 CHRONIC PAIN OF BOTH HIPS: ICD-10-CM

## 2021-03-31 DIAGNOSIS — M47.816 LUMBAR FACET ARTHROPATHY: ICD-10-CM

## 2021-03-31 DIAGNOSIS — M53.3 SACROILIAC JOINT DYSFUNCTION OF BOTH SIDES: Primary | ICD-10-CM

## 2021-03-31 PROCEDURE — 99214 OFFICE O/P EST MOD 30 MIN: CPT | Performed by: NURSE PRACTITIONER

## 2021-03-31 RX ORDER — HYDROCODONE BITARTRATE AND ACETAMINOPHEN 10; 325 MG/1; MG/1
1 TABLET ORAL 2 TIMES DAILY
Qty: 60 TABLET | Refills: 0 | Status: SHIPPED | OUTPATIENT
Start: 2021-03-31 | End: 2021-04-21 | Stop reason: SDUPTHER

## 2021-03-31 NOTE — PROGRESS NOTES
CHIEF COMPLAINT  Back pain is unchanged since last visit    Subjective   Madhu Santoro is a 75 y.o. male  who presents for follow-up.  He has a history of back pain. Office visit from 3/3/2021 with Dr. Meraz reviewed.  Patient was referred by Dr. Chen for evaluation and treatment of back pain.  Patient has had back pain for many years due to wear and tear from hard work.  He describes this as pain across the low back with no extremity pain.  Patient has been taking Norco 5/3/2025 twice daily for quite some time and recently asked to increase it to 2 in the morning and 2 at night.  Patient states that allows him to do yard work at home during the day.  He denies any side effects.  During this office visit patient was referred to physical therapy, lumbar and hip x-rays ordered.  Brief discussion of medial branch block or hip injections, patient is cautious/concerned about injections.  Opioid risk assessment performed showing low risk.  CSA signed.  Patient started on Norco 10/3/2025 #60.    Today his pain is 4/10VAS in severity. He continues with Norco 10/325 2/day.  This medication decreases his pain by 50%.  ADLs by self. He denies any side effects including constipation or somnolence.     He is currently participating in PT, he has not seen any improvement in his back pain, discussed that I would like him to complete the full round of physical therapy.     He has no interest in procedures due to a friend having developing foot drop following an injection which was administered for back pain.     Patient remained masked during entire encounter. No cough present. I donned a mask and eye protection throughout entire visit. Prior to donning mask and eye protection, hand hygiene was performed, as well as when it was doffed.  I was closer than 6 feet, but not for an extended period of time. No obvious exposure to any bodily fluids.    Back Pain  This is a chronic problem. The current episode started more than 1 year  ago. The pain is present in the lumbar spine (across his belt line). The quality of the pain is described as aching. Radiates to: whole leg aching pain bilaterally with standing and walking. The pain is at a severity of 5/10. The symptoms are aggravated by standing, sitting and position (walking). Stiffness is present all day. Associated symptoms include numbness. Pertinent negatives include no chest pain, dysuria, fever, headaches or weakness. He has tried analgesics, heat and home exercises (Norco 10/325) for the symptoms. The treatment provided moderate relief.      PEG Assessment   What number best describes your pain on average in the past week?4  What number best describes how, during the past week, pain has interfered with your enjoyment of life?5  What number best describes how, during the past week, pain has interfered with your general activity?  4    The following portions of the patient's history were reviewed and updated as appropriate: allergies, current medications, past family history, past medical history, past social history, past surgical history and problem list.    Review of Systems   Constitutional: Negative for chills and fever.   Respiratory: Negative for shortness of breath.    Cardiovascular: Negative for chest pain.   Gastrointestinal: Negative for constipation, diarrhea, nausea and vomiting.   Genitourinary: Negative for difficulty urinating, dysuria and enuresis.   Musculoskeletal: Positive for back pain.   Neurological: Positive for numbness. Negative for dizziness, weakness, light-headedness and headaches.   Psychiatric/Behavioral: Negative for confusion, hallucinations, self-injury, sleep disturbance and suicidal ideas. The patient is not nervous/anxious.    All other systems reviewed and are negative.    --  The aforementioned information the Chief Complaint section and above subjective data including any HPI data, and also the Review of Systems data, has been personally reviewed and  "affirmed.  --    Vitals:    03/31/21 0808   BP: 168/94   Pulse: 52   Resp: 18   Temp: 96.9 °F (36.1 °C)   SpO2: 97%   Weight: 81.6 kg (180 lb)   Height: 172.7 cm (67.99\")   PainSc:   4   PainLoc: Back     THREE-VIEW LUMBAR SPINE     HISTORY: Low back pain.     FINDINGS: There is mild disc space narrowing and moderate anterior  spurring scattered throughout the lumbar spine, particularly at L2-L3  and L4-L5. There is also spurring of the posterior facets throughout the  lumbar spine.     4 VIEW BILATERAL HIPS AND 1 VIEW AP PELVIS     HISTORY: Bilateral hip pain.     FINDINGS: There are very slight degenerative changes involving both hips  with some very slight joint space narrowing and some marginal spurring  of each acetabulum. There is also slight degenerative change at both  sacroiliac joints.     This report was finalized on 3/3/2021 10:35 AM by Dr. Vik Hawkins M.D.    Objective   Physical Exam  Vitals and nursing note reviewed.   Constitutional:       Appearance: Normal appearance. He is well-developed.   Eyes:      General: Lids are normal.   Cardiovascular:      Rate and Rhythm: Normal rate.   Pulmonary:      Effort: Pulmonary effort is normal.   Musculoskeletal:      Cervical back: Normal range of motion.      Lumbar back: Tenderness and bony tenderness present. Decreased range of motion.      Right hip: Tenderness present.      Left hip: Tenderness present.      Comments: POS Charles Triny  POS Charles Thigh Thrust  POS Charles SI Compression  Pain in low back with SLR bilaterally, no radicular pain   Neurological:      Mental Status: He is alert and oriented to person, place, and time.   Psychiatric:         Attention and Perception: Attention normal.         Mood and Affect: Mood normal.         Speech: Speech normal.         Behavior: Behavior normal.         Judgment: Judgment normal.       Assessment/Plan   Diagnoses and all orders for this visit:    1. Sacroiliac joint dysfunction of both sides (Primary)    2. " Chronic bilateral low back pain without sciatica  -     HYDROcodone-acetaminophen (NORCO)  MG per tablet; Take 1 tablet by mouth 2 (two) times a day. Take at least 6 hours apart DNF 4/3/2021  Dispense: 60 tablet; Refill: 0    3. Chronic pain of both hips  -     HYDROcodone-acetaminophen (NORCO)  MG per tablet; Take 1 tablet by mouth 2 (two) times a day. Take at least 6 hours apart DNF 4/3/2021  Dispense: 60 tablet; Refill: 0    4. Encounter for long-term (current) use of high-risk medication    5. Lumbar facet arthropathy      --- Xray report of bilateral hips and lumbar spine reviewed.   --- Not a candidate for NSAIDs due to Plavix.   --- The urine drug screen confirmation from 3/3/2021 has been reviewed and the result is appropriate based on patient history and RUTH report  --- CSA updated 3/3/2021  --- Refill Norco 10/325. DNF 4/3/2021 applied. Patient appears stable with current regimen. No adverse effects. Regarding continuation of opioids, there is no evidence of aberrant behavior or any red flags.  The patient continues with appropriate response to opioid therapy. ADL's remain intact by self.   --- I do think he could potentially benefit from bilateral SI injections and/or bilateral hip injections, patient is not interested any any injections at this time.  Briefly discussed that these injections are not in the spine, patient states he is still not interested.   --- Follow-up 2 months or sooner if needed.      RUTH REPORT  As part of the patient's treatment plan, I am prescribing controlled substances. The patient has been made aware of appropriate use of such medications, including potential risk of somnolence, limited ability to drive and/or work safely, and the potential for dependence or overdose. It has also bee made clear that these medications are for use by this patient only, without concomitant use of alcohol or other substances unless prescribed.     Patient has completed prescribing  agreement detailing terms of continued prescribing of controlled substances, including monitoring RUTH reports, urine drug screening, and pill counts if necessary. The patient is aware that inappropriate use will results in cessation of prescribing such medications.    RUTH report has been reviewed and scanned into the patient's chart.    As the clinician, I personally reviewed the RUTH from 3/31/2021 while the patient was in the office today.    History and physical exam exhibit continued safe and appropriate use of controlled substances.    EMR Dragon/Transcription disclaimer:   Much of this encounter note is an electronic transcription/translation of spoken language to printed text. The electronic translation of spoken language may permit erroneous, or at times, nonsensical words or phrases to be inadvertently transcribed; Although I have reviewed the note for such errors, some may still exist.

## 2021-04-05 ENCOUNTER — HOSPITAL ENCOUNTER (OUTPATIENT)
Dept: PHYSICAL THERAPY | Facility: HOSPITAL | Age: 76
Setting detail: THERAPIES SERIES
Discharge: HOME | End: 2021-04-05

## 2021-04-05 DIAGNOSIS — M54.50 CHRONIC BILATERAL LOW BACK PAIN WITHOUT SCIATICA: Primary | ICD-10-CM

## 2021-04-05 DIAGNOSIS — G89.29 CHRONIC BILATERAL LOW BACK PAIN WITHOUT SCIATICA: Primary | ICD-10-CM

## 2021-04-05 DIAGNOSIS — M25.551 HIP PAIN, BILATERAL: ICD-10-CM

## 2021-04-05 DIAGNOSIS — M25.552 HIP PAIN, BILATERAL: ICD-10-CM

## 2021-04-05 DIAGNOSIS — R29.898 WEAKNESS OF BOTH LOWER EXTREMITIES: ICD-10-CM

## 2021-04-05 DIAGNOSIS — M25.60 DECREASED RANGE OF MOTION: ICD-10-CM

## 2021-04-05 PROCEDURE — 97140 MANUAL THERAPY 1/> REGIONS: CPT

## 2021-04-05 PROCEDURE — 97110 THERAPEUTIC EXERCISES: CPT

## 2021-04-08 ENCOUNTER — HOSPITAL ENCOUNTER (OUTPATIENT)
Dept: PHYSICAL THERAPY | Facility: HOSPITAL | Age: 76
Setting detail: THERAPIES SERIES
Discharge: HOME | End: 2021-04-08

## 2021-04-08 DIAGNOSIS — R29.898 WEAKNESS OF BOTH LOWER EXTREMITIES: ICD-10-CM

## 2021-04-08 DIAGNOSIS — M25.551 HIP PAIN, BILATERAL: ICD-10-CM

## 2021-04-08 DIAGNOSIS — M25.60 DECREASED RANGE OF MOTION: ICD-10-CM

## 2021-04-08 DIAGNOSIS — G89.29 CHRONIC BILATERAL LOW BACK PAIN WITHOUT SCIATICA: Primary | ICD-10-CM

## 2021-04-08 DIAGNOSIS — M54.50 CHRONIC BILATERAL LOW BACK PAIN WITHOUT SCIATICA: Primary | ICD-10-CM

## 2021-04-08 DIAGNOSIS — M25.552 HIP PAIN, BILATERAL: ICD-10-CM

## 2021-04-08 PROCEDURE — 97140 MANUAL THERAPY 1/> REGIONS: CPT

## 2021-04-08 PROCEDURE — 97110 THERAPEUTIC EXERCISES: CPT

## 2021-04-08 NOTE — THERAPY TREATMENT NOTE
Outpatient Physical Therapy Ortho Treatment Note  [facility]     Patient Name: Madhu Santoro  : 1945  MRN: [id number]  Today's Date: 2021      Visit Date: 2021    Visit Dx:    ICD-10-CM ICD-9-CM   1. Chronic bilateral low back pain without sciatica  M54.5 724.2    G89.29 338.29   2. Weakness of both lower extremities  R29.898 729.89   3. Decreased range of motion  M25.60 719.50   4. Hip pain, bilateral  M25.551 719.45    M25.552        Patient Active Problem List   Diagnosis   • Anxiety   • Arthritis   • Chronic coronary artery disease   • HLD (hyperlipidemia)   • Benign essential hypertension   • DDD (degenerative disc disease), lumbosacral   • Cerebrovascular accident (CMS/HCC)   • Encounter for screening for cardiovascular disorders   • Gastroesophageal reflux disease   • Hyperlipidemia   • Stenosis of carotid artery   • Former smoker   • History of cardiac catheterization   • S/P ablation of atrial flutter   • Typical atrial flutter (CMS/HCC)   • S/P CABG (coronary artery bypass graft)   • Adenomatous polyp of ascending colon   • Abdominal bloating   • Stroke (CMS/HCC)   • PAD (peripheral artery disease) (CMS/HCC)   • Acute cholecystitis   • Right upper quadrant abdominal pain   • Chronic pain of both hips   • Chronic bilateral low back pain without sciatica   • Sacroiliac joint dysfunction of both sides   • Encounter for long-term (current) use of high-risk medication   • Lumbar facet arthropathy        Past Medical History:   Diagnosis Date   • Anxiety    • Arthritis    • Atrial flutter (CMS/HCC)     Status post cavotricuspid isthmus ablation by Dr. Gustafson on 17   • Mandel esophagus    • Benign essential hypertension    • CAD (coronary artery disease)     3 vessel CABG 17 by Dr. Cortez: ROSARIO-prox LAD, SVG-OM1, SVG-OM3   • Carotid artery disease (CMS/HCC)     Status post carotid endarterectomy - USG 4/10/17: 50-59% NICHELLE, 1-15% LICA.    • Colonic polyp    • DDD  (degenerative disc disease), lumbosacral    • GERD (gastroesophageal reflux disease)    • H/O bone density study 2013   • H/O complete eye exam 2014   • HLD (hyperlipidemia)    • Hypertension    • Lipid screening 05/31/2013   • Low back pain     physical therapy [facility] rehab 5-12-10   • Screening for prostate cancer 07/07/2015   • Stroke (CMS/HCC)         Past Surgical History:   Procedure Laterality Date   • CARDIAC CATHETERIZATION N/A 4/10/2017    Procedure: Left Heart Cath;  Surgeon: Marjorie Healy MD;  [facility];  Service:    • CARDIAC CATHETERIZATION N/A 4/10/2017    Procedure: Coronary angiography;  Surgeon: Marjorie Healy MD;  Location:  [facility] CATH INVASIVE LOCATION;  Service:    • CARDIAC CATHETERIZATION N/A 4/10/2017    Procedure: Left ventriculography;  Surgeon: Marjorie Healy MD;  [facility];  Service:    • CARDIAC ELECTROPHYSIOLOGY PROCEDURE N/A 4/18/2017    Procedure: Ablation atrial flutter;  Surgeon: Jose Antonio Gustafson MD;  [facility];  Service:    • CHOLECYSTECTOMY     • CHOLECYSTECTOMY WITH INTRAOPERATIVE CHOLANGIOGRAM N/A 9/7/2019    Procedure: Laparoscopic cholecystectomy with intraoperative cholangiogram;  Surgeon: Gauri Travis MD;  Location: [facility] MAIN OR;  Service: General   • COLONOSCOPY  01/06/2015    Diverticulosis, one TA   • COLONOSCOPY N/A 2/14/2019    tics, NBIH, adenomatous polyp x 2   • CORONARY ARTERY BYPASS GRAFT N/A 4/11/2017    Procedure: AR STERNOTOMY CORONARY ARTERY BYPASS GRAFT TIMES 3 USING LEFT INTERNAL MAMMARY ARTERY AND LEFT GREATER SAPHENOUS VEIN GRAFT PER ENDOSCOPIC VEIN HARVESTING AND PRP ;  Surgeon: Temo Cortez MD;  Location: [facility] MAIN OR;  Service:    • ENDOSCOPY  01/06/2015    HH, Mandel's esophagus   • ENDOSCOPY N/A 2/14/2019    Z line irregular, HH, Mandel's esophagus   • VASECTOMY                         PT Assessment/Plan     Row Name 04/08/21 1200           PT Assessment    Assessment Comments  Patient continue to have high level low back pain. Added SNTC with patient bvery limited. Added QS with towel as patient very weak with left quad  -WS       User Key  (r) = Recorded By, (t) = Taken By, (c) = Cosigned By    Initials Name Provider Type    WS Lisandro Elizabeth PTA Physical Therapy Assistant            OP Exercises     Row Name 04/08/21 1125 04/08/21 1100          Subjective Comments    Subjective Comments  doing exercises at home. Continue to walk 2 x a day  -WS  --        Subjective Pain    Able to rate subjective pain?  yes  -WS  --     Pre-Treatment Pain Level  8  -WS  --        Total Minutes    14712 - PT Therapeutic Exercise Minutes  25  -WS  --     98616 - PT Manual Therapy Minutes  --  8  -WS        Exercise 1    Exercise Name 1  Nustep  UE/LE  -WS  --     Time 1  5min  -WS  --     Additional Comments  L1  -WS  --        Exercise 2    Exercise Name 2  supine Ankle pumps  -WS  --     Reps 2  10e  -WS  --        Exercise 3    Exercise Name 3  LTR in small range  -WS  --     Cueing 3  Verbal;Tactile  -WS  --     Reps 3  5  -WS  --     Time 3  3 sec  -WS  --     Additional Comments  11-1 oclock  -WS  --        Exercise 4    Exercise Name 4  supine heel slide  -WS  --     Cueing 4  Verbal;Tactile  -WS  --     Reps 4  5  -WS  --     Time 4  3sec  -WS  --        Exercise 5    Exercise Name 5  PPT   -WS  --     Cueing 5  Verbal;Tactile  -WS  --     Sets 5  2  -WS  --     Reps 5  5  -WS  --     Time 5  3sec  -WS  --     Additional Comments  copius cuing  -WS  --        Exercise 6    Exercise Name 6  mini squat  -WS  --     Reps 6  10  -WS  --        Exercise 7    Exercise Name 7  standing MIP  -WS  --     Reps 7  10  -WS  --        Exercise 8    Exercise Name 8  sidestep  -WS  --     Reps 8  x 3  -WS  --        Exercise 9    Exercise Name 9  LAQ thony  -WS  --     Reps 9  15e  -WS  --        Exercise 10    Exercise Name 10  seated HS stretch  -WS  --     Reps 10  3   -WS  --     Time 10  20  -WS  --        Exercise 11    Exercise Name 11  QS towel  -WS  --     Reps 11  x10 thony  -WS  --        Exercise 12    Exercise Name 12  SNTC  -WS  --     Reps 12  3  -WS  --     Time 12  20  -WS  --       User Key  (r) = Recorded By, (t) = Taken By, (c) = Cosigned By    Initials Name Provider Type    Lisandro Martinez PTA Physical Therapy Assistant                      Manual Rx (last 36 hours)      Manual Treatments     Row Name 04/08/21 1100             Total Minutes    76828 - PT Manual Therapy Minutes  8  -WS         Manual Rx 1    Manual Rx 1 Location  R SL for L/thony LB  -WS      Manual Rx 1 Type  STM, noted TPs in pvm L>R  -WS      Manual Rx 1 Duration  8  -WS        User Key  (r) = Recorded By, (t) = Taken By, (c) = Cosigned By    Initials Name Provider Type    Lisandro Martinez PTA Physical Therapy Assistant          PT OP Goals     Row Name 04/08/21 1200          PT Short Term Goals    STG Date to Achieve  04/11/21  -     STG 1  Patient will be independent and compliant with initial HEP.  -     STG 1 Progress  Ongoing;Progressing  -     STG 2  Patient will demonstrate correct posture in sitting and standing to decrease strain/pain on spine.  -     STG 2 Progress  Ongoing  -     STG 3  Pt will tolerate 5 minutes of hooklying or supine position to improve ability to perform therapeutic exercise.  -     STG 3 Progress  Progressing  -        Long Term Goals    LTG Date to Achieve  05/11/21  -     LTG 1  Pt will be independent with advanced HEP for spinal stabilization and LE/core strengthening.  -     LTG 1 Progress  Ongoing  -     LTG 2  Pt will demonstrate correct body mechanics with bending, reaching, and lifting ADL’s.  -     LTG 2 Progress  Progressing  -     LTG 3  Pt will score 50% or less on Oswestry Disability Index indicating decrease in perceived functional disability.  -     LTG 3 Progress  Ongoing  -     LTG 4  Pt will report 25% decrease  in pain.  -     LTG 4 Progress  Ongoing  -       User Key  (r) = Recorded By, (t) = Taken By, (c) = Cosigned By    Initials Name Provider Type    Lisandro Martinez PTA Physical Therapy Assistant          Therapy Education  Given: HEP, Symptoms/condition management  Program: Reinforced  How Provided: Verbal  Provided to: Patient              Time Calculation:   Start Time: 1125  Stop Time: 1200  Time Calculation (min): 35 min  Therapy Charges for Today     Code Description Service Date Service Provider Modifiers Qty    [id number]  PT THER PROC EA 15 MIN 4/8/2021 Lisandro Elizabeth PTA GP 2    [id number] HC PT MANUAL THERAPY EA 15 MIN 4/8/2021 Lisandro Elizabeth PTA GP 1                    Lisandro Elizabeth PTA  4/8/2021

## 2021-04-10 ENCOUNTER — APPOINTMENT (OUTPATIENT)
Dept: CT IMAGING | Facility: HOSPITAL | Age: 76
End: 2021-04-10

## 2021-04-10 ENCOUNTER — HOSPITAL ENCOUNTER (INPATIENT)
Facility: HOSPITAL | Age: 76
LOS: 2 days | Discharge: HOME | End: 2021-04-13
Attending: EMERGENCY MEDICINE | Admitting: INTERNAL MEDICINE

## 2021-04-10 ENCOUNTER — APPOINTMENT (OUTPATIENT)
Dept: CARDIOLOGY | Facility: HOSPITAL | Age: 76
End: 2021-04-10

## 2021-04-10 DIAGNOSIS — R29.90 STROKE-LIKE SYMPTOMS: Primary | ICD-10-CM

## 2021-04-10 LAB
ALBUMIN SERPL-MCNC: 4.1 G/DL (ref 3.5–5.2)
ALBUMIN/GLOB SERPL: 1.4 G/DL
ALP SERPL-CCNC: 102 U/L (ref 39–117)
ALT SERPL W P-5'-P-CCNC: 13 U/L (ref 1–41)
ANION GAP SERPL CALCULATED.3IONS-SCNC: 10.7 MMOL/L (ref 5–15)
AORTIC DIMENSIONLESS INDEX: 0.8 (DI)
APTT PPP: 30.8 SECONDS (ref 22.7–35.4)
AST SERPL-CCNC: 25 U/L (ref 1–40)
BASOPHILS # BLD AUTO: 0.04 10*3/MM3 (ref 0–0.2)
BASOPHILS NFR BLD AUTO: 0.5 % (ref 0–1.5)
BH CV ECHO MEAS - AO MAX PG (FULL): 1.4 MMHG
BH CV ECHO MEAS - AO MAX PG: 6.3 MMHG
BH CV ECHO MEAS - AO MEAN PG (FULL): 2 MMHG
BH CV ECHO MEAS - AO MEAN PG: 4 MMHG
BH CV ECHO MEAS - AO ROOT AREA (BSA CORRECTED): 1.7
BH CV ECHO MEAS - AO ROOT AREA: 8.6 CM^2
BH CV ECHO MEAS - AO ROOT DIAM: 3.3 CM
BH CV ECHO MEAS - AO V2 MAX: 125 CM/SEC
BH CV ECHO MEAS - AO V2 MEAN: 92.8 CM/SEC
BH CV ECHO MEAS - AO V2 VTI: 34.5 CM
BH CV ECHO MEAS - ASC AORTA: 3.3 CM
BH CV ECHO MEAS - AVA(I,A): 2.4 CM^2
BH CV ECHO MEAS - AVA(I,D): 2.4 CM^2
BH CV ECHO MEAS - AVA(V,A): 2.5 CM^2
BH CV ECHO MEAS - AVA(V,D): 2.5 CM^2
BH CV ECHO MEAS - BSA(HAYCOCK): 2 M^2
BH CV ECHO MEAS - BSA: 1.9 M^2
BH CV ECHO MEAS - BZI_BMI: 26.6 KILOGRAMS/M^2
BH CV ECHO MEAS - BZI_METRIC_HEIGHT: 172.7 CM
BH CV ECHO MEAS - BZI_METRIC_WEIGHT: 79.4 KG
BH CV ECHO MEAS - EDV(CUBED): 68.9 ML
BH CV ECHO MEAS - EDV(MOD-SP2): 77 ML
BH CV ECHO MEAS - EDV(MOD-SP4): 63 ML
BH CV ECHO MEAS - EDV(TEICH): 74.2 ML
BH CV ECHO MEAS - EF(CUBED): 60.8 %
BH CV ECHO MEAS - EF(MOD-BP): 57.4 %
BH CV ECHO MEAS - EF(MOD-SP2): 57.1 %
BH CV ECHO MEAS - EF(MOD-SP4): 58.7 %
BH CV ECHO MEAS - EF(TEICH): 52.8 %
BH CV ECHO MEAS - ESV(CUBED): 27 ML
BH CV ECHO MEAS - ESV(MOD-SP2): 33 ML
BH CV ECHO MEAS - ESV(MOD-SP4): 26 ML
BH CV ECHO MEAS - ESV(TEICH): 35 ML
BH CV ECHO MEAS - FS: 26.8 %
BH CV ECHO MEAS - IVS/LVPW: 1
BH CV ECHO MEAS - IVSD: 0.9 CM
BH CV ECHO MEAS - LAT PEAK E' VEL: 10.9 CM/SEC
BH CV ECHO MEAS - LV DIASTOLIC VOL/BSA (35-75): 32.6 ML/M^2
BH CV ECHO MEAS - LV MASS(C)D: 114.1 GRAMS
BH CV ECHO MEAS - LV MASS(C)DI: 59.1 GRAMS/M^2
BH CV ECHO MEAS - LV MAX PG: 4.8 MMHG
BH CV ECHO MEAS - LV MEAN PG: 2 MMHG
BH CV ECHO MEAS - LV SYSTOLIC VOL/BSA (12-30): 13.5 ML/M^2
BH CV ECHO MEAS - LV V1 MAX: 110 CM/SEC
BH CV ECHO MEAS - LV V1 MEAN: 70 CM/SEC
BH CV ECHO MEAS - LV V1 VTI: 28.7 CM
BH CV ECHO MEAS - LVIDD: 4.1 CM
BH CV ECHO MEAS - LVIDS: 3 CM
BH CV ECHO MEAS - LVLD AP2: 7.1 CM
BH CV ECHO MEAS - LVLD AP4: 7 CM
BH CV ECHO MEAS - LVLS AP2: 6 CM
BH CV ECHO MEAS - LVLS AP4: 5.8 CM
BH CV ECHO MEAS - LVOT AREA (M): 2.8 CM^2
BH CV ECHO MEAS - LVOT AREA: 2.8 CM^2
BH CV ECHO MEAS - LVOT DIAM: 1.9 CM
BH CV ECHO MEAS - LVPWD: 0.9 CM
BH CV ECHO MEAS - MED PEAK E' VEL: 6.9 CM/SEC
BH CV ECHO MEAS - MV A MAX VEL: 88.3 CM/SEC
BH CV ECHO MEAS - MV DEC SLOPE: 439 CM/SEC^2
BH CV ECHO MEAS - MV DEC TIME: 278 SEC
BH CV ECHO MEAS - MV E MAX VEL: 98.1 CM/SEC
BH CV ECHO MEAS - MV E/A: 1.1
BH CV ECHO MEAS - MV MAX PG: 6.8 MMHG
BH CV ECHO MEAS - MV MEAN PG: 2 MMHG
BH CV ECHO MEAS - MV P1/2T MAX VEL: 129 CM/SEC
BH CV ECHO MEAS - MV P1/2T: 86.1 MSEC
BH CV ECHO MEAS - MV V2 MAX: 130 CM/SEC
BH CV ECHO MEAS - MV V2 MEAN: 61.9 CM/SEC
BH CV ECHO MEAS - MV V2 VTI: 39.1 CM
BH CV ECHO MEAS - MVA P1/2T LCG: 1.7 CM^2
BH CV ECHO MEAS - MVA(P1/2T): 2.6 CM^2
BH CV ECHO MEAS - MVA(VTI): 2.1 CM^2
BH CV ECHO MEAS - RAP SYSTOLE: 3 MMHG
BH CV ECHO MEAS - SI(AO): 152.8 ML/M^2
BH CV ECHO MEAS - SI(CUBED): 21.7 ML/M^2
BH CV ECHO MEAS - SI(LVOT): 42.1 ML/M^2
BH CV ECHO MEAS - SI(MOD-SP2): 22.8 ML/M^2
BH CV ECHO MEAS - SI(MOD-SP4): 19.2 ML/M^2
BH CV ECHO MEAS - SI(TEICH): 20.3 ML/M^2
BH CV ECHO MEAS - SV(AO): 295.1 ML
BH CV ECHO MEAS - SV(CUBED): 41.9 ML
BH CV ECHO MEAS - SV(LVOT): 81.4 ML
BH CV ECHO MEAS - SV(MOD-SP2): 44 ML
BH CV ECHO MEAS - SV(MOD-SP4): 37 ML
BH CV ECHO MEAS - SV(TEICH): 39.2 ML
BH CV ECHO MEAS - TAPSE (>1.6): 1.4 CM
BH CV ECHO MEASUREMENTS AVERAGE E/E' RATIO: 11.02
BH CV XLRA - RV BASE: 3.7 CM
BH CV XLRA - TDI S': 11.3 CM/SEC
BILIRUB SERPL-MCNC: 0.9 MG/DL (ref 0–1.2)
BILIRUB UR QL STRIP: NEGATIVE
BUN SERPL-MCNC: 15 MG/DL (ref 8–23)
BUN/CREAT SERPL: 15.2 (ref 7–25)
CALCIUM SPEC-SCNC: 9.6 MG/DL (ref 8.6–10.5)
CHLORIDE SERPL-SCNC: 104 MMOL/L (ref 98–107)
CLARITY UR: CLEAR
CO2 SERPL-SCNC: 27.3 MMOL/L (ref 22–29)
COLOR UR: YELLOW
CREAT SERPL-MCNC: 0.99 MG/DL (ref 0.76–1.27)
DEPRECATED RDW RBC AUTO: 46.4 FL (ref 37–54)
EOSINOPHIL # BLD AUTO: 0.11 10*3/MM3 (ref 0–0.4)
EOSINOPHIL NFR BLD AUTO: 1.5 % (ref 0.3–6.2)
ERYTHROCYTE [DISTWIDTH] IN BLOOD BY AUTOMATED COUNT: 16.6 % (ref 12.3–15.4)
GFR SERPL CREATININE-BSD FRML MDRD: 73 ML/MIN/1.73
GLOBULIN UR ELPH-MCNC: 3 GM/DL
GLUCOSE BLDC GLUCOMTR-MCNC: 78 MG/DL (ref 70–130)
GLUCOSE SERPL-MCNC: 101 MG/DL (ref 65–99)
GLUCOSE UR STRIP-MCNC: NEGATIVE MG/DL
HCT VFR BLD AUTO: 53.7 % (ref 37.5–51)
HGB BLD-MCNC: 17.5 G/DL (ref 13–17.7)
HGB UR QL STRIP.AUTO: NEGATIVE
IMM GRANULOCYTES # BLD AUTO: 0.1 10*3/MM3 (ref 0–0.05)
IMM GRANULOCYTES NFR BLD AUTO: 1.3 % (ref 0–0.5)
INR PPP: 1.19 (ref 0.9–1.1)
KETONES UR QL STRIP: NEGATIVE
LEFT ATRIUM VOLUME INDEX: 13 ML/M2
LEUKOCYTE ESTERASE UR QL STRIP.AUTO: NEGATIVE
LV EF 2D ECHO EST: 57 %
LYMPHOCYTES # BLD AUTO: 1.61 10*3/MM3 (ref 0.7–3.1)
LYMPHOCYTES NFR BLD AUTO: 21.6 % (ref 19.6–45.3)
MCH RBC QN AUTO: 27.4 PG (ref 26.6–33)
MCHC RBC AUTO-ENTMCNC: 32.6 G/DL (ref 31.5–35.7)
MCV RBC AUTO: 84.2 FL (ref 79–97)
MONOCYTES # BLD AUTO: 1.1 10*3/MM3 (ref 0.1–0.9)
MONOCYTES NFR BLD AUTO: 14.8 % (ref 5–12)
NEUTROPHILS NFR BLD AUTO: 4.49 10*3/MM3 (ref 1.7–7)
NEUTROPHILS NFR BLD AUTO: 60.3 % (ref 42.7–76)
NITRITE UR QL STRIP: NEGATIVE
NRBC BLD AUTO-RTO: 0 /100 WBC (ref 0–0.2)
PH UR STRIP.AUTO: 6.5 [PH] (ref 5–8)
PLATELET # BLD AUTO: 467 10*3/MM3 (ref 140–450)
PMV BLD AUTO: 9.8 FL (ref 6–12)
POTASSIUM SERPL-SCNC: 4.2 MMOL/L (ref 3.5–5.2)
PROT SERPL-MCNC: 7.1 G/DL (ref 6–8.5)
PROT UR QL STRIP: NEGATIVE
PROTHROMBIN TIME: 14.9 SECONDS (ref 11.7–14.2)
RBC # BLD AUTO: 6.38 10*6/MM3 (ref 4.14–5.8)
SARS-COV-2 ORF1AB RESP QL NAA+PROBE: NOT DETECTED
SODIUM SERPL-SCNC: 142 MMOL/L (ref 136–145)
SP GR UR STRIP: 1.02 (ref 1–1.03)
UROBILINOGEN UR QL STRIP: NORMAL
WBC # BLD AUTO: 7.45 10*3/MM3 (ref 3.4–10.8)

## 2021-04-10 PROCEDURE — G0378 HOSPITAL OBSERVATION PER HR: HCPCS

## 2021-04-10 PROCEDURE — 70450 CT HEAD/BRAIN W/O DYE: CPT

## 2021-04-10 PROCEDURE — U0004 COV-19 TEST NON-CDC HGH THRU: HCPCS | Performed by: EMERGENCY MEDICINE

## 2021-04-10 PROCEDURE — 93005 ELECTROCARDIOGRAM TRACING: CPT | Performed by: EMERGENCY MEDICINE

## 2021-04-10 PROCEDURE — 93010 ELECTROCARDIOGRAM REPORT: CPT | Performed by: INTERNAL MEDICINE

## 2021-04-10 PROCEDURE — 85610 PROTHROMBIN TIME: CPT | Performed by: EMERGENCY MEDICINE

## 2021-04-10 PROCEDURE — 80053 COMPREHEN METABOLIC PANEL: CPT | Performed by: EMERGENCY MEDICINE

## 2021-04-10 PROCEDURE — 93306 TTE W/DOPPLER COMPLETE: CPT

## 2021-04-10 PROCEDURE — 99284 EMERGENCY DEPT VISIT MOD MDM: CPT

## 2021-04-10 PROCEDURE — 82962 GLUCOSE BLOOD TEST: CPT

## 2021-04-10 PROCEDURE — 85730 THROMBOPLASTIN TIME PARTIAL: CPT | Performed by: EMERGENCY MEDICINE

## 2021-04-10 PROCEDURE — 81003 URINALYSIS AUTO W/O SCOPE: CPT | Performed by: EMERGENCY MEDICINE

## 2021-04-10 PROCEDURE — 85025 COMPLETE CBC W/AUTO DIFF WBC: CPT | Performed by: EMERGENCY MEDICINE

## 2021-04-10 PROCEDURE — U0005 INFEC AGEN DETEC AMPLI PROBE: HCPCS | Performed by: EMERGENCY MEDICINE

## 2021-04-10 PROCEDURE — 93306 TTE W/DOPPLER COMPLETE: CPT | Performed by: INTERNAL MEDICINE

## 2021-04-10 RX ORDER — ACETAMINOPHEN 650 MG/1
650 SUPPOSITORY RECTAL EVERY 4 HOURS PRN
Status: DISCONTINUED | OUTPATIENT
Start: 2021-04-10 | End: 2021-04-13 | Stop reason: HOSPADM

## 2021-04-10 RX ORDER — ACETAMINOPHEN 160 MG/5ML
650 SOLUTION ORAL EVERY 4 HOURS PRN
Status: DISCONTINUED | OUTPATIENT
Start: 2021-04-10 | End: 2021-04-13 | Stop reason: HOSPADM

## 2021-04-10 RX ORDER — SODIUM CHLORIDE 0.9 % (FLUSH) 0.9 %
10 SYRINGE (ML) INJECTION AS NEEDED
Status: DISCONTINUED | OUTPATIENT
Start: 2021-04-10 | End: 2021-04-13 | Stop reason: HOSPADM

## 2021-04-10 RX ORDER — HYDROCODONE BITARTRATE AND ACETAMINOPHEN 10; 325 MG/1; MG/1
1 TABLET ORAL EVERY 4 HOURS PRN
Status: DISCONTINUED | OUTPATIENT
Start: 2021-04-10 | End: 2021-04-13 | Stop reason: HOSPADM

## 2021-04-10 RX ORDER — CLOPIDOGREL BISULFATE 75 MG/1
75 TABLET ORAL DAILY
Status: DISCONTINUED | OUTPATIENT
Start: 2021-04-10 | End: 2021-04-12

## 2021-04-10 RX ORDER — PANTOPRAZOLE SODIUM 40 MG/1
40 TABLET, DELAYED RELEASE ORAL DAILY
Status: DISCONTINUED | OUTPATIENT
Start: 2021-04-10 | End: 2021-04-13 | Stop reason: HOSPADM

## 2021-04-10 RX ORDER — ACETAMINOPHEN 325 MG/1
650 TABLET ORAL EVERY 4 HOURS PRN
Status: DISCONTINUED | OUTPATIENT
Start: 2021-04-10 | End: 2021-04-13 | Stop reason: HOSPADM

## 2021-04-10 RX ORDER — ASPIRIN 325 MG
325 TABLET ORAL ONCE
Status: COMPLETED | OUTPATIENT
Start: 2021-04-10 | End: 2021-04-10

## 2021-04-10 RX ORDER — SODIUM CHLORIDE 9 MG/ML
125 INJECTION, SOLUTION INTRAVENOUS CONTINUOUS
Status: DISCONTINUED | OUTPATIENT
Start: 2021-04-10 | End: 2021-04-11

## 2021-04-10 RX ORDER — RAMIPRIL 5 MG/1
5 CAPSULE ORAL DAILY
Status: DISCONTINUED | OUTPATIENT
Start: 2021-04-10 | End: 2021-04-13 | Stop reason: HOSPADM

## 2021-04-10 RX ORDER — SODIUM CHLORIDE 0.9 % (FLUSH) 0.9 %
10 SYRINGE (ML) INJECTION EVERY 12 HOURS SCHEDULED
Status: DISCONTINUED | OUTPATIENT
Start: 2021-04-10 | End: 2021-04-13 | Stop reason: HOSPADM

## 2021-04-10 RX ORDER — ONDANSETRON 4 MG/1
4 TABLET, FILM COATED ORAL EVERY 6 HOURS PRN
Status: DISCONTINUED | OUTPATIENT
Start: 2021-04-10 | End: 2021-04-13 | Stop reason: HOSPADM

## 2021-04-10 RX ORDER — HYDROCODONE BITARTRATE AND ACETAMINOPHEN 5; 325 MG/1; MG/1
1 TABLET ORAL EVERY 4 HOURS PRN
Status: DISCONTINUED | OUTPATIENT
Start: 2021-04-10 | End: 2021-04-11

## 2021-04-10 RX ORDER — ONDANSETRON 2 MG/ML
4 INJECTION INTRAMUSCULAR; INTRAVENOUS EVERY 6 HOURS PRN
Status: DISCONTINUED | OUTPATIENT
Start: 2021-04-10 | End: 2021-04-13 | Stop reason: HOSPADM

## 2021-04-10 RX ORDER — ROSUVASTATIN CALCIUM 10 MG/1
10 TABLET, COATED ORAL DAILY
Status: DISCONTINUED | OUTPATIENT
Start: 2021-04-10 | End: 2021-04-11

## 2021-04-10 RX ADMIN — PANTOPRAZOLE SODIUM 40 MG: 40 TABLET, DELAYED RELEASE ORAL at 18:06

## 2021-04-10 RX ADMIN — CLOPIDOGREL 75 MG: 75 TABLET, FILM COATED ORAL at 18:06

## 2021-04-10 RX ADMIN — RAMIPRIL 5 MG: 5 CAPSULE ORAL at 21:11

## 2021-04-10 RX ADMIN — ROSUVASTATIN CALCIUM 10 MG: 10 TABLET, FILM COATED ORAL at 21:11

## 2021-04-10 RX ADMIN — SODIUM CHLORIDE 125 ML/HR: 9 INJECTION, SOLUTION INTRAVENOUS at 16:00

## 2021-04-10 RX ADMIN — SODIUM CHLORIDE 500 ML: 9 INJECTION, SOLUTION INTRAVENOUS at 13:35

## 2021-04-10 RX ADMIN — ASPIRIN 325 MG: 325 TABLET ORAL at 15:59

## 2021-04-10 RX ADMIN — SODIUM CHLORIDE, PRESERVATIVE FREE 10 ML: 5 INJECTION INTRAVENOUS at 21:11

## 2021-04-10 NOTE — PROGRESS NOTES
Clinical Pharmacy Services: Medication History    Madhu Santoro is a 75 y.o. male presenting to [facility] for   Chief Complaint   Patient presents with   • Altered Mental Status       He  has a past medical history of Anxiety, Arthritis, Atrial flutter (CMS/HCC), Mandel esophagus, Benign essential hypertension, CAD (coronary artery disease), Carotid artery disease (CMS/HCC), Colonic polyp, DDD (degenerative disc disease), lumbosacral, GERD (gastroesophageal reflux disease), H/O bone density study (2013), H/O complete eye exam (2014), HLD (hyperlipidemia), Hypertension, Lipid screening (05/31/2013), Low back pain, Screening for prostate cancer (07/07/2015), and Stroke (CMS/[facility]).    Allergies as of 04/10/2021 - Reviewed 04/10/2021   Allergen Reaction Noted   • Atorvastatin Myalgia 05/23/2018   • Penicillins Hives, Swelling, and Rash 05/09/2013       Medication information was obtained from: Patient  Pharmacy and Phone Number:     [facility] - [address] [facility] - [phone]  - [phone]   [address] [facility] [address]  Phone: [phone] Fax: [phone]        Prior to Admission Medications     Prescriptions Last Dose Informant Patient Reported? Taking?    clopidogrel (PLAVIX) 75 MG tablet 4/9/2021 Self No Yes    Take 1 tablet by mouth Daily.    HYDROcodone-acetaminophen (NORCO)  MG per tablet 4/10/2021 Self No Yes    Take 1 tablet by mouth 2 (two) times a day. Take at least 6 hours apart DNF 4/3/2021    Myrbetriq 25 MG tablet sustained-release 24 hour 24 hr tablet 4/9/2021 Self Yes Yes    Take 25 mg by mouth Daily.    pantoprazole (PROTONIX) 40 MG EC tablet 4/9/2021 Self No Yes    Take 1 tablet by mouth Daily.    ramipril (ALTACE) 5 MG capsule 4/9/2021 Self No Yes    Take 1 capsule by mouth Daily.    rosuvastatin (CRESTOR) 10 MG tablet 4/9/2021 Self No Yes    Take 1 tablet by mouth Daily.     nitroglycerin (NITROSTAT) 0.4 MG SL tablet  Self No No    Place 1 tablet under the tongue Every 5 (Five) Minutes As Needed for Chest Pain. Take no more than 3 doses in 15 minutes.            Medication notes: Patient reports last dose of norco this morning, 4/10/21.    This medication list is complete to the best of my knowledge as of 4/10/2021    Please call if questions.    Larisa Hair PharmD Candidate 2021  Medication History Technician  [phone]    4/10/2021 14:35 EDT

## 2021-04-10 NOTE — ED NOTES
"Nursing report ED to floor  Madhu Santoro  75 y.o.  male    HPI (triage note):   Chief Complaint   Patient presents with   • Altered Mental Status       Admitting doctor:   Amandeep Estes MD    Admitting diagnosis:   The encounter diagnosis was Stroke-like symptoms.    Code status:   Current Code Status     Date Active Code Status Order ID Comments User Context       4/10/2021 1434 CPR [id number]  Amandeep Estes MD ED     Advance Care Planning Activity      Questions for Current Code Status     Question Answer Comment    Code Status CPR     Medical Interventions (Level of Support Prior to Arrest) Full           Allergies:   Atorvastatin and Penicillins    Weight:       04/10/21  1305   Weight: 79.4 kg (175 lb)       Most recent vitals:   Vitals:    04/10/21 1305 04/10/21 1319 04/10/21 1557   BP:  164/85 151/76   Pulse: 83  52   Resp: 16  16   Temp: 97 °F (36.1 °C)     SpO2: 97%  96%   Weight: 79.4 kg (175 lb)     Height: 172.7 cm (68\")         Active LDAs/IV Access:   Lines, Drains & Airways    Active LDAs     Name:   Placement date:   Placement time:   Site:   Days:    Peripheral IV 04/10/21 1320 Left Antecubital   04/10/21    1320    Antecubital   less than 1                Labs (abnormal labs have a star):   Labs Reviewed   COMPREHENSIVE METABOLIC PANEL - Abnormal; Notable for the following components:       Result Value    Glucose 101 (*)     All other components within normal limits    Narrative:     GFR Normal >60  Chronic Kidney Disease <60  Kidney Failure <15     PROTIME-INR - Abnormal; Notable for the following components:    Protime 14.9 (*)     INR 1.19 (*)     All other components within normal limits   CBC WITH AUTO DIFFERENTIAL - Abnormal; Notable for the following components:    RBC 6.38 (*)     Hematocrit 53.7 (*)     RDW 16.6 (*)     Platelets 467 (*)     Monocyte % 14.8 (*)     Immature Grans % 1.3 (*)     Monocytes, Absolute 1.10 (*)     Immature Grans, Absolute 0.10 (*)     All other " components within normal limits   APTT - Normal   COVID PRE-OP / PRE-PROCEDURE SCREENING ORDER (NO ISOLATION)    Narrative:     The following orders were created for panel order COVID PRE-OP / PRE-PROCEDURE SCREENING ORDER (NO ISOLATION) - Swab, Nasopharynx.  Procedure                               Abnormality         Status                     ---------                               -----------         ------                     COVID-19,APTIMA PANTHER,...[[id number]]                                                   Please view results for these tests on the individual orders.   COVID-19,APTIMA PANTHER,[facility] IN-HOUSE,NP/OP SWAB IN UTM/VTM/SALINE TRANSPORT MEDIA,24 HR TAT   URINALYSIS W/ MICROSCOPIC IF INDICATED (NO CULTURE)   CBC AND DIFFERENTIAL    Narrative:     The following orders were created for panel order CBC & Differential.  Procedure                               Abnormality         Status                     ---------                               -----------         ------                     CBC Auto Differential[[id number]]        Abnormal            Final result                 Please view results for these tests on the individual orders.       EKG:   ECG 12 Lead   Preliminary Result   HEART RATE= 55  bpm   RR Interval= 1088  ms   UT Interval= 169  ms   P Horizontal Axis= 45  deg   P Front Axis= 65  deg   QRSD Interval= 107  ms   QT Interval= 427  ms   QRS Axis= -28  deg   T Wave Axis= 29  deg   - BORDERLINE ECG -   Sinus rhythm   Left ventricular hypertrophy   Electronically Signed By:    Date and Time of Study: 2021-04-10 13:38:03          Meds given in ED:   Medications   sodium chloride 0.9 % flush 10 mL (has no administration in time range)   sodium chloride 0.9 % infusion (125 mL/hr Intravenous New Bag 4/10/21 1600)   sodium chloride 0.9 % bolus 500 mL (0 mL Intravenous Stopped 4/10/21 1557)   aspirin tablet 325 mg (325 mg Oral Given 4/10/21 8159)       Imaging results:  CT Head Without  Contrast    Result Date: 4/10/2021  No acute intracranial hemorrhage. Mild small vessel ischemic disease has slightly progressed from 2015. If there is clinical concern for acute infarction, this would be better assessed with MRI.  Discussed with Dr. Orozco at 2:05 PM.  This report was finalized on 4/10/2021 2:05 PM by Dr. Tawana Barreto M.D.        Ambulatory status:   -Up with assist x1    Social issues:   Social History     Socioeconomic History   • Marital status:      Spouse name: Not on file   • Number of children: Not on file   • Years of education: Not on file   • Highest education level: Not on file   Tobacco Use   • Smoking status: Former Smoker   • Smokeless tobacco: Never Used   • Tobacco comment: CAFFEINE USE   Vaping Use   • Vaping Use: Never used   Substance and Sexual Activity   • Alcohol use: Yes     Comment: SOCIALLY   • Drug use: No   • Sexual activity: Yes     Partners: Female    Nursing report ED to floor       Yoselin Ayala, RN  04/10/21 2863

## 2021-04-10 NOTE — H&P
Patient Name:  Madhu Santoro  YOB: 1945  MRN:  [id number]  Admit Date:  4/10/2021  Patient Care Team:  Damian Chen MD as PCP - General (Family Medicine)  Jr Temo Cortez MD as Surgeon (Cardiothoracic Surgery)      Subjective   History Present Illness     Chief Complaint   Patient presents with   • Altered Mental Status         History of Present Illness     75-year-old male, with history of coronary artery disease, CVA, hyperlipidemia, hypertension, anxiety, presented to the hospital with main complaints of having confusion prior to presenting shin to the hospital.  The patient also had soft and slow speech, he had stroke several years ago, he had similar symptoms at that time.  CT scan of the head was done in the emergency room, it showed no acute intracranial findings.  Patient does have mild small vessel ischemic disease.  Patient will be seen by Neurology Service, he will be admitted to hospitalist service for further workup of ongoing symptoms.  Patient received a full-dose aspirin in the emergency room.      Review of Systems     Constitutional: Positive for activity change,   Negative for appetite change, chills and fatigue.   HENT: Negative for congestion, drooling, ear pain and hearing loss.    Eyes: Negative for discharge and itching.   Respiratory: Negative for apnea, choking and chest tightness.    Cardiovascular:  Negative for palpitations. Negative for chest pain.   Gastrointestinal:  Negative for abdominal distention, blood in stool, constipation and diarrhea.   Endocrine: Negative for cold intolerance and heat intolerance.   Genitourinary: Negative for flank pain and frequency.   Musculoskeletal: Negative for arthralgias and myalgias.   Skin: Negative for color change and pallor.   Allergic/Immunologic: Negative for environmental allergies and food allergies.   Neurological:   Positive for confusion and slurred speech  Hematological: Negative for adenopathy. Does not  bruise/bleed easily.   Psychiatric/Behavioral: Negative for agitation and behavioral problems.     Personal History     Past Medical History:   Diagnosis Date   • Anxiety    • Arthritis    • Atrial flutter (CMS/HCC)     Status post cavotricuspid isthmus ablation by Dr. Gustafson on 4/18/17   • Mandel esophagus    • Benign essential hypertension    • CAD (coronary artery disease)     3 vessel CABG 4/11/17 by Dr. Cortez: ROSARIO-prox LAD, SVG-OM1, SVG-OM3   • Carotid artery disease (CMS/HCC)     Status post carotid endarterectomy - USG 4/10/17: 50-59% NICHELLE, 1-15% LICA.    • Colonic polyp    • DDD (degenerative disc disease), lumbosacral    • GERD (gastroesophageal reflux disease)    • H/O bone density study 2013   • H/O complete eye exam 2014   • HLD (hyperlipidemia)    • Hypertension    • Lipid screening 05/31/2013   • Low back pain     [facility]   • Screening for prostate cancer 07/07/2015   • Stroke (CMS/HCC)      Past Surgical History:   Procedure Laterality Date   • CARDIAC CATHETERIZATION N/A 4/10/2017    Procedure: Left Heart Cath;  Surgeon: Marjorie Healy MD;  Location: [facility] CATH INVASIVE LOCATION;  Service:    • CARDIAC CATHETERIZATION N/A 4/10/2017    Procedure: Coronary angiography;  Surgeon: Marjorie Healy MD;  Location: [facility] CATH INVASIVE LOCATION;  Service:    • CARDIAC CATHETERIZATION N/A 4/10/2017    Procedure: Left ventriculography;  Surgeon: Marjorie Healy MD;  Location: [facility] CATH INVASIVE LOCATION;  Service:    • CARDIAC ELECTROPHYSIOLOGY PROCEDURE N/A 4/18/2017    Procedure: Ablation atrial flutter;  Surgeon: Jose Antonio Gustafson MD;  Location: [facility] CATH INVASIVE LOCATION;  Service:    • CHOLECYSTECTOMY     • CHOLECYSTECTOMY WITH INTRAOPERATIVE CHOLANGIOGRAM N/A 9/7/2019    Procedure: Laparoscopic cholecystectomy with intraoperative cholangiogram;  Surgeon: Gauri Travis MD;  Location: [facility] MAIN OR;  Service: General   • COLONOSCOPY  01/06/2015     Diverticulosis, one TA   • COLONOSCOPY N/A 2/14/2019    tics, NBIH, adenomatous polyp x 2   • CORONARY ARTERY BYPASS GRAFT N/A 4/11/2017    Procedure: AR STERNOTOMY CORONARY ARTERY BYPASS GRAFT TIMES 3 USING LEFT INTERNAL MAMMARY ARTERY AND LEFT GREATER SAPHENOUS VEIN GRAFT PER ENDOSCOPIC VEIN HARVESTING AND PRP ;  Surgeon: Temo Cortez MD;  Location: [facility];  Service:    • ENDOSCOPY  01/06/2015    HH, Mandel's esophagus   • ENDOSCOPY N/A 2/14/2019    Z line irregular, HH, Mandel's esophagus   • VASECTOMY       Family History   Problem Relation Age of Onset   • Heart disease Mother    • Heart attack Mother    • Stroke Mother    • Heart disease Father    • Heart attack Father    • Stroke Father    • Arthritis Other    • Diabetes Other    • Hypertension Other    • Kidney disease Other         stones   • Hypertension Sister    • Heart attack Brother    • Heart disease Brother    • No Known Problems Maternal Grandmother    • No Known Problems Maternal Grandfather    • No Known Problems Paternal Grandmother    • No Known Problems Paternal Grandfather    • No Known Problems Brother    • Heart disease Brother    • Heart attack Brother    • Diabetes Brother    • Pancreatic cancer Paternal Cousin      Social History     Tobacco Use   • Smoking status: Former Smoker   • Smokeless tobacco: Never Used   • Tobacco comment: CAFFEINE USE   Vaping Use   • Vaping Use: Never used   Substance Use Topics   • Alcohol use: Yes     Comment: SOCIALLY   • Drug use: No     No current facility-administered medications on file prior to encounter.     Current Outpatient Medications on File Prior to Encounter   Medication Sig Dispense Refill   • clopidogrel (PLAVIX) 75 MG tablet Take 1 tablet by mouth Daily. 90 tablet 1   • HYDROcodone-acetaminophen (NORCO)  MG per tablet Take 1 tablet by mouth 2 (two) times a day. Take at least 6 hours apart DNF 4/3/2021 60 tablet 0   • Myrbetriq 25 MG tablet sustained-release 24 hour 24  hr tablet Take 25 mg by mouth Daily.     • pantoprazole (PROTONIX) 40 MG EC tablet Take 1 tablet by mouth Daily. 90 tablet 1   • ramipril (ALTACE) 5 MG capsule Take 1 capsule by mouth Daily. 90 capsule 1   • rosuvastatin (CRESTOR) 10 MG tablet Take 1 tablet by mouth Daily. 90 tablet 1   • nitroglycerin (NITROSTAT) 0.4 MG SL tablet Place 1 tablet under the tongue Every 5 (Five) Minutes As Needed for Chest Pain. Take no more than 3 doses in 15 minutes. 20 tablet 2     Allergies   Allergen Reactions   • Atorvastatin Myalgia     Myalgia   • Penicillins Hives, Swelling and Rash       Objective    Objective     Vital Signs  Temp:  [97 °F (36.1 °C)] 97 °F (36.1 °C)  Heart Rate:  [83] 83  Resp:  [16] 16  BP: (164)/(85) 164/85  SpO2:  [97 %] 97 %  on   ;      Body mass index is 26.61 kg/m².    Physical Exam    General Appearance:    Alert, cooperative, no distress, AAOx3  Head:    Normocephalic, without obvious abnormality, atraumatic  Eyes:    PERRL, conjunctiva/corneas clear, EOM's intact, both eyes  Throat:   Lips, tongue, gums normal; oral mucosa pink and moist  Neck:   Supple, symmetrical, trachea midline, no JVD  Lungs:     Clear to auscultation bilaterally, respirations unlabored  Chest Wall:    No tenderness or deformity   Heart:    Regular rate and rhythm, S1 and S2 normal, no murmur,no  Rub or gallop  Abdomen:     Soft, non-tender, bowel sounds active, no masses, no organomegaly   Extremities:   Extremities normal, atraumatic, no cyanosis or edema  Pulses:   Pulses palpable in all extremities  Skin:   Skin is warm and dry,  no rashes or palpable lesions  Neurologic:   CNII-XII intact, motor strength grossly intact, sensation grossly intact to light touch, no focal deficits noted        Results Review:  I reviewed the patient's new clinical results.  I reviewed the patient's new imaging results and agree with the interpretation.  I reviewed the patient's other test results and agree with the interpretation  I  personally viewed and interpreted the patient's EKG/Telemetry data  Discussed with ED provider.    Lab Results (last 24 hours)     Procedure Component Value Units Date/Time    CBC & Differential [[id number]]  (Abnormal) Collected: 04/10/21 1334    Specimen: Blood Updated: 04/10/21 1346    Narrative:      The following orders were created for panel order CBC & Differential.  Procedure                               Abnormality         Status                     ---------                               -----------         ------                     CBC Auto Differential[[id number]]        Abnormal            Final result                 Please view results for these tests on the individual orders.    Comprehensive Metabolic Panel [[id number]]  (Abnormal) Collected: 04/10/21 1334    Specimen: Blood Updated: 04/10/21 1405     Glucose 101 mg/dL      BUN 15 mg/dL      Creatinine 0.99 mg/dL      Sodium 142 mmol/L      Potassium 4.2 mmol/L      Chloride 104 mmol/L      CO2 27.3 mmol/L      Calcium 9.6 mg/dL      Total Protein 7.1 g/dL      Albumin 4.10 g/dL      ALT (SGPT) 13 U/L      AST (SGOT) 25 U/L      Alkaline Phosphatase 102 U/L      Total Bilirubin 0.9 mg/dL      eGFR Non African Amer 73 mL/min/1.73      Globulin 3.0 gm/dL      A/G Ratio 1.4 g/dL      BUN/Creatinine Ratio 15.2     Anion Gap 10.7 mmol/L     Narrative:      GFR Normal >60  Chronic Kidney Disease <60  Kidney Failure <15      Protime-INR [[id number]]  (Abnormal) Collected: 04/10/21 1334    Specimen: Blood Updated: 04/10/21 1356     Protime 14.9 Seconds      INR 1.19    aPTT [[id number]]  (Normal) Collected: 04/10/21 1334    Specimen: Blood Updated: 04/10/21 1356     PTT 30.8 seconds     CBC Auto Differential [[id number]]  (Abnormal) Collected: 04/10/21 1334    Specimen: Blood Updated: 04/10/21 1346     WBC 7.45 10*3/mm3      RBC 6.38 10*6/mm3      Hemoglobin 17.5 g/dL      Hematocrit 53.7 %      MCV 84.2 fL      MCH 27.4 pg      MCHC 32.6 g/dL      RDW 16.6 %       RDW-SD 46.4 fl      MPV 9.8 fL      Platelets 467 10*3/mm3      Neutrophil % 60.3 %      Lymphocyte % 21.6 %      Monocyte % 14.8 %      Eosinophil % 1.5 %      Basophil % 0.5 %      Immature Grans % 1.3 %      Neutrophils, Absolute 4.49 10*3/mm3      Lymphocytes, Absolute 1.61 10*3/mm3      Monocytes, Absolute 1.10 10*3/mm3      Eosinophils, Absolute 0.11 10*3/mm3      Basophils, Absolute 0.04 10*3/mm3      Immature Grans, Absolute 0.10 10*3/mm3      nRBC 0.0 /100 WBC           Imaging Results (Last 24 Hours)     Procedure Component Value Units Date/Time    CT Head Without Contrast [[id number]] Collected: 04/10/21 1358     Updated: 04/10/21 1409    Narrative:      EXAM:  CT HEAD WO CONTRAST-     HISTORY:  Stroke symptoms. Confusion, dropping things.     COMPARISON:  MR brain with and without contrast 03/16/2015. CT head  without contrast 03/16/2015.     TECHNIQUE: Noncontrast images of the brain were obtained. Reformatted  images were reviewed. Radiation dose reduction techniques were utilized,  including automated exposure control and exposure modulation based on  body size.     FINDINGS:       The ventricles and sulci are within normal limits for patient age.  No  acute intracranial hemorrhage or pathologic extra-axial collection is  identified.  There is no midline shift or mass effect.  The basal  cisterns are patent. There is mild small vessel ischemic disease,  slightly progressed from 2015. Small chronic infarcts in the left  frontal and parietal lobes are better assessed on prior MRI. There is  calcific intracranial atherosclerosis with involvement of the posterior  circulation.          Impression:      No acute intracranial hemorrhage. Mild small vessel ischemic disease has  slightly progressed from 2015. If there is clinical concern for acute  infarction, this would be better assessed with MRI.     Discussed with Dr. Orozco at 2:05 PM.     This report was finalized on 4/10/2021 2:05 PM by Dr. Gilliam  Estevan CASTILLO             Results for orders placed during the hospital encounter of 10/24/17    Adult Transthoracic Echo Complete W/ Cont if Necessary Per Protocol    Interpretation Summary  · Left ventricular systolic function is normal. Calculated EF = 55.8%. Estimated EF was in agreement with the calculated EF. Normal left ventricular cavity size and wall thickness noted. All left ventricular wall segments contract normally.  · Left ventricular diastolic dysfunction is noted (grade II w/high LAP) consistent with pseudonormalization.  · The aortic valve is abnormal in structure. The valve exhibits sclerosis.  · Trace to mild tricuspid valve regurgitation is present. Estimated right ventricular systolic pressure from tricuspid regurgitation is normal (<35 mmHg).      ECG 12 Lead   Preliminary Result   HEART RATE= 55  bpm   RR Interval= 1088  ms   NV Interval= 169  ms   P Horizontal Axis= 45  deg   P Front Axis= 65  deg   QRSD Interval= 107  ms   QT Interval= 427  ms   QRS Axis= -28  deg   T Wave Axis= 29  deg   - BORDERLINE ECG -   Sinus rhythm   Left ventricular hypertrophy   Electronically Signed By:    Date and Time of Study: 2021-04-10 13:38:03           Assessment/Plan     Active Hospital Problems    Diagnosis  POA   • **Stroke-like symptoms [R29.90]  Yes   • S/P CABG (coronary artery bypass graft) [Z95.1]  Not Applicable   • Gastroesophageal reflux disease [K21.9]  Yes   • Anxiety [F41.9]  Yes   • Benign essential hypertension [I10]  Yes   • Chronic coronary artery disease [I25.10]  Yes   • HLD (hyperlipidemia) [E78.5]  Yes      Resolved Hospital Problems   No resolved problems to display.      Assessment and plan  1. Stroke-like symptoms, admit patient to medicine service.  Neurology consultation has been requested.  Patient received full-dose aspirin in the emergency room.  We will also continue statin therapy.  We will also order a repeat echo, last one I see on record here is from 2017.   2.  Coronary  artery disease, status post CABG in the past, patient will be resumed on his home cardiac medications.  He is noted to be on Plavix and statin therapy.   3.  Gastroesophageal reflux disease,  We will continue home dose of Protonix therapy.  4. Hypertension, resume home antihypertensive medications.  5.  Code status is full code.  DVT prophylaxis.    Amandeep Estes MD  [facility]  04/10/21  14:35 EDT

## 2021-04-10 NOTE — ED PROVIDER NOTES
" EMERGENCY DEPARTMENT ENCOUNTER    Room Number:  15/15  Date of encounter:  4/10/2021  PCP: Damian Chen MD  Historian: Patient and spouse      HPI:  Chief Complaint: Altered mental status  A complete HPI/ROS/PMH/PSH/SH/FH are unobtainable due to: N/A    Context: Madhu Santoro is a 75 y.o. male who presents to the ED c/o \"feeling off\" today.  (Actually, his wife states that she noticed his symptoms started yesterday.  He was in [facility] earlier today buying his groceries and got to the checkout line, he seemed confused and was unable to use a prescription card to make payment.  He states he \"kept dropping things\".  His wife is not with him at the time, but when the patient is limited by, she states that his speech seemed really soft and slow, which is unusual for him.  She states that she also noticed this yesterday-the patient was tired and fatigued and pretty much slept all day.  She states this is very similar to when he had a stroke years ago.  The episode at [facility]'s about 45 minutes ago and is still present.  There are no aggravating or alleviating factors.  He reports no recent falls or fainting spells.  No palpitations.  No headache.  No numbness or tingling or visual disturbances.      The patient was placed in a mask in triage, hand hygiene was performed before and after my interaction with the patient.  I wore a mask, safety glasses and gloves during my entire interaction with the patient.    PAST MEDICAL HISTORY  Active Ambulatory Problems     Diagnosis Date Noted   • Anxiety    • Arthritis    • Chronic coronary artery disease    • HLD (hyperlipidemia)    • Benign essential hypertension    • DDD (degenerative disc disease), lumbosacral    • Cerebrovascular accident (CMS/[facility])    • Encounter for screening for cardiovascular disorders 11/20/2012   • Gastroesophageal reflux disease 04/04/2016   • Hyperlipidemia 04/04/2016   • Stenosis of carotid artery 04/26/2017   • Former smoker 04/26/2017   • History " of cardiac catheterization 12/16/2011   • S/P ablation of atrial flutter 04/26/2017   • Typical atrial flutter (CMS/HCC) 04/26/2017   • S/P CABG (coronary artery bypass graft) 04/26/2017   • Adenomatous polyp of ascending colon 02/12/2019   • Abdominal bloating 02/12/2019   • Stroke (CMS/HCC) 03/29/2019   • PAD (peripheral artery disease) (CMS/HCC) 03/29/2019   • Acute cholecystitis 09/06/2019   • Right upper quadrant abdominal pain 09/06/2019   • Chronic pain of both hips 03/31/2021   • Chronic bilateral low back pain without sciatica 03/31/2021   • Sacroiliac joint dysfunction of both sides 03/31/2021   • Encounter for long-term (current) use of high-risk medication 03/31/2021   • Lumbar facet arthropathy 03/31/2021     Resolved Ambulatory Problems     Diagnosis Date Noted   • No Resolved Ambulatory Problems     Past Medical History:   Diagnosis Date   • Atrial flutter (CMS/HCC)    • Mandel esophagus    • CAD (coronary artery disease)    • Carotid artery disease (CMS/HCC)    • Colonic polyp    • GERD (gastroesophageal reflux disease)    • H/O bone density study 2013   • H/O complete eye exam 2014   • Hypertension    • Lipid screening 05/31/2013   • Low back pain    • Screening for prostate cancer 07/07/2015         PAST SURGICAL HISTORY  Past Surgical History:   Procedure Laterality Date   • CARDIAC CATHETERIZATION N/A 4/10/2017    Procedure: Left Heart Cath;  Surgeon: Marjorie Healy MD;  Location: [facility] CATH INVASIVE LOCATION;  Service:    • CARDIAC CATHETERIZATION N/A 4/10/2017    Procedure: Coronary angiography;  Surgeon: Marjorie Healy MD;  Location:  [facility] CATH INVASIVE LOCATION;  Service:    • CARDIAC CATHETERIZATION N/A 4/10/2017    Procedure: Left ventriculography;  Surgeon: Marjorie Healy MD;  Location:  [facility] CATH INVASIVE LOCATION;  Service:    • CARDIAC ELECTROPHYSIOLOGY PROCEDURE N/A 4/18/2017    Procedure: Ablation atrial flutter;  Surgeon: Jose Antonio Gustafson MD;  Location: [facility] CATH INVASIVE  LOCATION;  Service:    • CHOLECYSTECTOMY     • CHOLECYSTECTOMY WITH INTRAOPERATIVE CHOLANGIOGRAM N/A 9/7/2019    Procedure: Laparoscopic cholecystectomy with intraoperative cholangiogram;  Surgeon: Gauri Travis MD;  Location: [facility] MAIN OR;  Service: General   • COLONOSCOPY  01/06/2015    Diverticulosis, one TA   • COLONOSCOPY N/A 2/14/2019    tics, NBIH, adenomatous polyp x 2   • CORONARY ARTERY BYPASS GRAFT N/A 4/11/2017    Procedure: AR STERNOTOMY CORONARY ARTERY BYPASS GRAFT TIMES 3 USING LEFT INTERNAL MAMMARY ARTERY AND LEFT GREATER SAPHENOUS VEIN GRAFT PER ENDOSCOPIC VEIN HARVESTING AND PRP ;  Surgeon: Temo Cortez MD;  Location: [facility] MAIN OR;  Service:    • ENDOSCOPY  01/06/2015    HH, Mandel's esophagus   • ENDOSCOPY N/A 2/14/2019    Z line irregular, HH, Mandel's esophagus   • VASECTOMY           FAMILY HISTORY  Family History   Problem Relation Age of Onset   • Heart disease Mother    • Heart attack Mother    • Stroke Mother    • Heart disease Father    • Heart attack Father    • Stroke Father    • Arthritis Other    • Diabetes Other    • Hypertension Other    • Kidney disease Other         stones   • Hypertension Sister    • Heart attack Brother    • Heart disease Brother    • No Known Problems Maternal Grandmother    • No Known Problems Maternal Grandfather    • No Known Problems Paternal Grandmother    • No Known Problems Paternal Grandfather    • No Known Problems Brother    • Heart disease Brother    • Heart attack Brother    • Diabetes Brother    • Pancreatic cancer Paternal Cousin          SOCIAL HISTORY  Social History     Socioeconomic History   • Marital status:      Spouse name: Not on file   • Number of children: Not on file   • Years of education: Not on file   • Highest education level: Not on file   Tobacco Use   • Smoking status: Former Smoker   • Smokeless tobacco: Never Used   • Tobacco comment: CAFFEINE USE   Vaping Use   • Vaping Use: Never used   Substance and  Sexual Activity   • Alcohol use: Yes     Comment: SOCIALLY   • Drug use: No   • Sexual activity: Yes     Partners: Female         ALLERGIES  Atorvastatin and Penicillins        REVIEW OF SYSTEMS  Review of Systems   Constitutional: Negative for chills and fever.   Respiratory: Negative for chest tightness and shortness of breath.    Cardiovascular: Negative for chest pain.   Gastrointestinal: Positive for diarrhea (1 episode of loose stool this morning.  Nonbloody per patient). Negative for abdominal pain, nausea and vomiting.   Genitourinary: Negative for difficulty urinating and dysuria.   Neurological: Positive for speech difficulty. Negative for syncope, numbness and headaches.        All systems reviewed and negative except for those discussed in HPI.       PHYSICAL EXAM    I have reviewed the triage vital signs and nursing notes.    ED Triage Vitals   Temp Heart Rate Resp BP SpO2   04/10/21 1305 04/10/21 1305 04/10/21 1305 04/10/21 1319 04/10/21 1305   97 °F (36.1 °C) 83 16 164/85 97 %      Temp src Heart Rate Source Patient Position BP Location FiO2 (%)   -- -- -- -- --              Physical Exam   Constitutional: Pt. is alert.  He appears oriented. No distress.   HENT: Normocephalic and atraumatic,  EOM are normal. Pupils are equal, round, and reactive to light. Oropharynx moist/nonerythematous.  Neck: Normal range of motion. Neck supple. No JVD present. No tracheal deviation present. No thyromegaly present.   Cardiovascular: Normal rate, regular rhythm and normal heart sounds. Exam reveals no gallop and no friction rub.   No murmur heard.  Pulmonary/Chest: Effort normal and breath sounds normal. No stridor. No respiratory distress. No wheezes, no rales.   Abdominal: Soft. Bowel sounds are normal. No distension. There is no tenderness. There is no rebound and no guarding.   Musculoskeletal: Normal range of motion. No edema, tenderness or deformity.   Neurological: Patient is awake and alert.  His speech is  very slightly slurred and soft.  He has a very subtle right-sided facial droop.  The rest of his neurologic exam is normal.  The above findings would give him a stroke scale of 2.   Skin: Skin is warm and dry. No rash noted. Pt. is not diaphoretic. No erythema.   Psychiatric: Mood, affect and judgment normal.   Nursing note and vitals reviewed.        LAB RESULTS  Recent Results (from the past 24 hour(s))   Comprehensive Metabolic Panel    Collection Time: 04/10/21  1:34 PM    Specimen: Blood   Result Value Ref Range    Glucose 101 (H) 65 - 99 mg/dL    BUN 15 8 - 23 mg/dL    Creatinine 0.99 0.76 - 1.27 mg/dL    Sodium 142 136 - 145 mmol/L    Potassium 4.2 3.5 - 5.2 mmol/L    Chloride 104 98 - 107 mmol/L    CO2 27.3 22.0 - 29.0 mmol/L    Calcium 9.6 8.6 - 10.5 mg/dL    Total Protein 7.1 6.0 - 8.5 g/dL    Albumin 4.10 3.50 - 5.20 g/dL    ALT (SGPT) 13 1 - 41 U/L    AST (SGOT) 25 1 - 40 U/L    Alkaline Phosphatase 102 39 - 117 U/L    Total Bilirubin 0.9 0.0 - 1.2 mg/dL    eGFR Non African Amer 73 >60 mL/min/1.73    Globulin 3.0 gm/dL    A/G Ratio 1.4 g/dL    BUN/Creatinine Ratio 15.2 7.0 - 25.0    Anion Gap 10.7 5.0 - 15.0 mmol/L   Protime-INR    Collection Time: 04/10/21  1:34 PM    Specimen: Blood   Result Value Ref Range    Protime 14.9 (H) 11.7 - 14.2 Seconds    INR 1.19 (H) 0.90 - 1.10   aPTT    Collection Time: 04/10/21  1:34 PM    Specimen: Blood   Result Value Ref Range    PTT 30.8 22.7 - 35.4 seconds   CBC Auto Differential    Collection Time: 04/10/21  1:34 PM    Specimen: Blood   Result Value Ref Range    WBC 7.45 3.40 - 10.80 10*3/mm3    RBC 6.38 (H) 4.14 - 5.80 10*6/mm3    Hemoglobin 17.5 13.0 - 17.7 g/dL    Hematocrit 53.7 (H) 37.5 - 51.0 %    MCV 84.2 79.0 - 97.0 fL    MCH 27.4 26.6 - 33.0 pg    MCHC 32.6 31.5 - 35.7 g/dL    RDW 16.6 (H) 12.3 - 15.4 %    RDW-SD 46.4 37.0 - 54.0 fl    MPV 9.8 6.0 - 12.0 fL    Platelets 467 (H) 140 - 450 10*3/mm3    Neutrophil % 60.3 42.7 - 76.0 %    Lymphocyte % 21.6 19.6  - 45.3 %    Monocyte % 14.8 (H) 5.0 - 12.0 %    Eosinophil % 1.5 0.3 - 6.2 %    Basophil % 0.5 0.0 - 1.5 %    Immature Grans % 1.3 (H) 0.0 - 0.5 %    Neutrophils, Absolute 4.49 1.70 - 7.00 10*3/mm3    Lymphocytes, Absolute 1.61 0.70 - 3.10 10*3/mm3    Monocytes, Absolute 1.10 (H) 0.10 - 0.90 10*3/mm3    Eosinophils, Absolute 0.11 0.00 - 0.40 10*3/mm3    Basophils, Absolute 0.04 0.00 - 0.20 10*3/mm3    Immature Grans, Absolute 0.10 (H) 0.00 - 0.05 10*3/mm3    nRBC 0.0 0.0 - 0.2 /100 WBC   ECG 12 Lead    Collection Time: 04/10/21  1:38 PM   Result Value Ref Range    QT Interval 427 ms       Ordered the above labs and independently reviewed the results.        RADIOLOGY  CT Head Without Contrast    Result Date: 4/10/2021  EXAM:  CT HEAD WO CONTRAST-  HISTORY:  Stroke symptoms. Confusion, dropping things.  COMPARISON:  MR brain with and without contrast 03/16/2015. CT head without contrast 03/16/2015.  TECHNIQUE: Noncontrast images of the brain were obtained. Reformatted images were reviewed. Radiation dose reduction techniques were utilized, including automated exposure control and exposure modulation based on body size.  FINDINGS:   The ventricles and sulci are within normal limits for patient age.  No acute intracranial hemorrhage or pathologic extra-axial collection is identified.  There is no midline shift or mass effect.  The basal cisterns are patent. There is mild small vessel ischemic disease, slightly progressed from 2015. Small chronic infarcts in the left frontal and parietal lobes are better assessed on prior MRI. There is calcific intracranial atherosclerosis with involvement of the posterior circulation.       No acute intracranial hemorrhage. Mild small vessel ischemic disease has slightly progressed from 2015. If there is clinical concern for acute infarction, this would be better assessed with MRI.  Discussed with Dr. Orozco at 2:05 PM.  This report was finalized on 4/10/2021 2:05 PM by Dr. Gilliam  Estevan CASTILLO I ordered the above noted radiological studies. Reviewed by me and discussed with radiologist.  See dictation for official radiology interpretation.      PROCEDURES    Procedures      MEDICATIONS GIVEN IN ER    Medications   sodium chloride 0.9 % flush 10 mL (has no administration in time range)   sodium chloride 0.9 % infusion (has no administration in time range)   aspirin tablet 325 mg (has no administration in time range)   sodium chloride 0.9 % bolus 500 mL (500 mL Intravenous New Bag 4/10/21 1335)         PROGRESS, DATA ANALYSIS, CONSULTS, AND MEDICAL DECISION MAKING    Any/all labs have been independently reviewed by me.  Any/all radiology studies have been reviewed by me and discussed with radiologist dictating the report.   EKG's independently viewed and interpreted by me.  Discussion below represents my analysis of pertinent findings related to patient's condition, differential diagnosis, treatment plan and final disposition.      ED Course as of Apr 10 1429   Sat Apr 10, 2021   1335 His symptoms started yesterday and his stroke scale is less than 4, therefore he is not an IV TPA nor interventional candidate.  We will continue with standard stroke work-up.    [WC]   1344 EKG performed at 1338 and interpreted by me shows normal sinus rhythm with a heart rate 55 bpm. The intervals are normal. QRS complexes are left-ventricular hypertrophy. ST-T segments unremarkable. There is no significant change compared to 1/26/2020.    [WC]   1405 CT of the brain was independently viewed by me and discussed with radiology (Dr. Barreto)-there are no acute processes identified, however there is progression of small vessel ischemic disease when compared to prior brain CTs.  He also has chronic infarcts.  See dictated report for official interpretation.    [WC]   1406 His wife reports that his presenting symptoms today are very similar to his last stroke-we will admit for further work-up and discussed the  case with stroke neurology.    [WC]   1408 Case discussed with Dr. Garcias (stroke neurology)-he agrees to see the patient in consultation and suggests full-strength aspirin p.o.    [WC]   1428 Case discussed with Dr. Estes ([facility])-he accepts the patient for admission to a telemetry bed.    [WC]      ED Course User Index  [WC] Lisandro Orozco MD       AS OF 14:29 EDT VITALS:    BP - 164/85  HR - 83  TEMP - 97 °F (36.1 °C)  02 SATS - 97%        DIAGNOSIS  Final diagnoses:   Stroke-like symptoms         DISPOSITION  Observation-telemetry           Lisandro Orozco MD  04/10/21 1430

## 2021-04-10 NOTE — PLAN OF CARE
Goal Outcome Evaluation:  Plan of Care Reviewed With: patient, spouse  Progress: no change  Outcome Summary: A+0x4.  NIH=0.  Up SBA.  Working on MRI screening sheet.  CTM and progress towards goals as tolerated.

## 2021-04-11 ENCOUNTER — APPOINTMENT (OUTPATIENT)
Dept: MRI IMAGING | Facility: HOSPITAL | Age: 76
End: 2021-04-11

## 2021-04-11 PROBLEM — I63.9 CVA (CEREBRAL VASCULAR ACCIDENT): Status: ACTIVE | Noted: 2021-04-11

## 2021-04-11 LAB
ALBUMIN SERPL-MCNC: 3.2 G/DL (ref 3.5–5.2)
ALBUMIN/GLOB SERPL: 1.5 G/DL
ALP SERPL-CCNC: 77 U/L (ref 39–117)
ALT SERPL W P-5'-P-CCNC: 13 U/L (ref 1–41)
ANION GAP SERPL CALCULATED.3IONS-SCNC: 9.2 MMOL/L (ref 5–15)
AST SERPL-CCNC: 17 U/L (ref 1–40)
BASOPHILS # BLD AUTO: 0.03 10*3/MM3 (ref 0–0.2)
BASOPHILS NFR BLD AUTO: 0.6 % (ref 0–1.5)
BILIRUB SERPL-MCNC: 0.8 MG/DL (ref 0–1.2)
BUN SERPL-MCNC: 11 MG/DL (ref 8–23)
BUN/CREAT SERPL: 14.5 (ref 7–25)
CALCIUM SPEC-SCNC: 7.8 MG/DL (ref 8.6–10.5)
CHLORIDE SERPL-SCNC: 110 MMOL/L (ref 98–107)
CO2 SERPL-SCNC: 20.8 MMOL/L (ref 22–29)
CREAT SERPL-MCNC: 0.76 MG/DL (ref 0.76–1.27)
DEPRECATED RDW RBC AUTO: 45.3 FL (ref 37–54)
EOSINOPHIL # BLD AUTO: 0.14 10*3/MM3 (ref 0–0.4)
EOSINOPHIL NFR BLD AUTO: 2.6 % (ref 0.3–6.2)
ERYTHROCYTE [DISTWIDTH] IN BLOOD BY AUTOMATED COUNT: 14.8 % (ref 12.3–15.4)
GFR SERPL CREATININE-BSD FRML MDRD: 100 ML/MIN/1.73
GLOBULIN UR ELPH-MCNC: 2.2 GM/DL
GLUCOSE BLDC GLUCOMTR-MCNC: 79 MG/DL (ref 70–130)
GLUCOSE SERPL-MCNC: 83 MG/DL (ref 65–99)
HCT VFR BLD AUTO: 46.9 % (ref 37.5–51)
HGB BLD-MCNC: 14.7 G/DL (ref 13–17.7)
IMM GRANULOCYTES # BLD AUTO: 0.06 10*3/MM3 (ref 0–0.05)
IMM GRANULOCYTES NFR BLD AUTO: 1.1 % (ref 0–0.5)
LYMPHOCYTES # BLD AUTO: 1.38 10*3/MM3 (ref 0.7–3.1)
LYMPHOCYTES NFR BLD AUTO: 25.5 % (ref 19.6–45.3)
MCH RBC QN AUTO: 26.5 PG (ref 26.6–33)
MCHC RBC AUTO-ENTMCNC: 31.3 G/DL (ref 31.5–35.7)
MCV RBC AUTO: 84.5 FL (ref 79–97)
MONOCYTES # BLD AUTO: 0.84 10*3/MM3 (ref 0.1–0.9)
MONOCYTES NFR BLD AUTO: 15.5 % (ref 5–12)
NEUTROPHILS NFR BLD AUTO: 2.97 10*3/MM3 (ref 1.7–7)
NEUTROPHILS NFR BLD AUTO: 54.7 % (ref 42.7–76)
NRBC BLD AUTO-RTO: 0 /100 WBC (ref 0–0.2)
PA ADP PRP-ACNC: 66 PRU (ref 194–418)
PLATELET # BLD AUTO: 361 10*3/MM3 (ref 140–450)
PMV BLD AUTO: 10.2 FL (ref 6–12)
POTASSIUM SERPL-SCNC: 3.5 MMOL/L (ref 3.5–5.2)
PROT SERPL-MCNC: 5.4 G/DL (ref 6–8.5)
QT INTERVAL: 427 MS
RBC # BLD AUTO: 5.55 10*6/MM3 (ref 4.14–5.8)
SODIUM SERPL-SCNC: 140 MMOL/L (ref 136–145)
WBC # BLD AUTO: 5.42 10*3/MM3 (ref 3.4–10.8)

## 2021-04-11 PROCEDURE — 85025 COMPLETE CBC W/AUTO DIFF WBC: CPT | Performed by: INTERNAL MEDICINE

## 2021-04-11 PROCEDURE — 85576 BLOOD PLATELET AGGREGATION: CPT | Performed by: PSYCHIATRY & NEUROLOGY

## 2021-04-11 PROCEDURE — 82962 GLUCOSE BLOOD TEST: CPT

## 2021-04-11 PROCEDURE — 99222 1ST HOSP IP/OBS MODERATE 55: CPT | Performed by: INTERNAL MEDICINE

## 2021-04-11 PROCEDURE — 80053 COMPREHEN METABOLIC PANEL: CPT | Performed by: INTERNAL MEDICINE

## 2021-04-11 PROCEDURE — 99222 1ST HOSP IP/OBS MODERATE 55: CPT | Performed by: PSYCHIATRY & NEUROLOGY

## 2021-04-11 PROCEDURE — 70551 MRI BRAIN STEM W/O DYE: CPT

## 2021-04-11 RX ORDER — SODIUM CHLORIDE 0.9 % (FLUSH) 0.9 %
10 SYRINGE (ML) INJECTION EVERY 12 HOURS SCHEDULED
Status: DISCONTINUED | OUTPATIENT
Start: 2021-04-11 | End: 2021-04-13 | Stop reason: HOSPADM

## 2021-04-11 RX ORDER — ALUMINA, MAGNESIA, AND SIMETHICONE 2400; 2400; 240 MG/30ML; MG/30ML; MG/30ML
20 SUSPENSION ORAL EVERY 6 HOURS PRN
COMMUNITY
End: 2022-08-24 | Stop reason: HOSPADM

## 2021-04-11 RX ORDER — SODIUM CHLORIDE 0.9 % (FLUSH) 0.9 %
10 SYRINGE (ML) INJECTION AS NEEDED
Status: DISCONTINUED | OUTPATIENT
Start: 2021-04-11 | End: 2021-04-13 | Stop reason: HOSPADM

## 2021-04-11 RX ORDER — ALUMINA, MAGNESIA, AND SIMETHICONE 2400; 2400; 240 MG/30ML; MG/30ML; MG/30ML
30 SUSPENSION ORAL EVERY 6 HOURS PRN
Status: DISCONTINUED | OUTPATIENT
Start: 2021-04-11 | End: 2021-04-13 | Stop reason: HOSPADM

## 2021-04-11 RX ORDER — ASPIRIN 300 MG/1
300 SUPPOSITORY RECTAL DAILY
Status: DISCONTINUED | OUTPATIENT
Start: 2021-04-11 | End: 2021-04-12

## 2021-04-11 RX ORDER — ATORVASTATIN CALCIUM 80 MG/1
80 TABLET, FILM COATED ORAL NIGHTLY
Status: DISCONTINUED | OUTPATIENT
Start: 2021-04-11 | End: 2021-04-13 | Stop reason: HOSPADM

## 2021-04-11 RX ORDER — ASPIRIN 325 MG
325 TABLET ORAL DAILY
Status: DISCONTINUED | OUTPATIENT
Start: 2021-04-11 | End: 2021-04-12

## 2021-04-11 RX ADMIN — CLOPIDOGREL 75 MG: 75 TABLET, FILM COATED ORAL at 09:12

## 2021-04-11 RX ADMIN — SODIUM CHLORIDE, PRESERVATIVE FREE 10 ML: 5 INJECTION INTRAVENOUS at 22:37

## 2021-04-11 RX ADMIN — SODIUM CHLORIDE, PRESERVATIVE FREE 10 ML: 5 INJECTION INTRAVENOUS at 13:28

## 2021-04-11 RX ADMIN — ROSUVASTATIN CALCIUM 10 MG: 10 TABLET, FILM COATED ORAL at 09:12

## 2021-04-11 RX ADMIN — ATORVASTATIN CALCIUM 80 MG: 80 TABLET, FILM COATED ORAL at 22:35

## 2021-04-11 RX ADMIN — RAMIPRIL 5 MG: 5 CAPSULE ORAL at 09:12

## 2021-04-11 RX ADMIN — SODIUM CHLORIDE, PRESERVATIVE FREE 10 ML: 5 INJECTION INTRAVENOUS at 09:13

## 2021-04-11 RX ADMIN — SODIUM CHLORIDE, PRESERVATIVE FREE 10 ML: 5 INJECTION INTRAVENOUS at 22:35

## 2021-04-11 RX ADMIN — ASPIRIN 325 MG: 325 TABLET ORAL at 13:28

## 2021-04-11 RX ADMIN — SODIUM CHLORIDE 125 ML/HR: 9 INJECTION, SOLUTION INTRAVENOUS at 00:11

## 2021-04-11 RX ADMIN — PANTOPRAZOLE SODIUM 40 MG: 40 TABLET, DELAYED RELEASE ORAL at 09:12

## 2021-04-11 RX ADMIN — HYDROCODONE BITARTRATE AND ACETAMINOPHEN 1 TABLET: 10; 325 TABLET ORAL at 09:14

## 2021-04-11 NOTE — PROGRESS NOTES
"    DAILY PROGRESS NOTE  [facility]    Patient Identification:  Name: Madhu Santoro  Age: 75 y.o.  Sex: male  :  1945  MRN: [id number]         Primary Care Physician: Damian Chen MD    Subjective:  Interval History: Feels back to baseline.  Denies headache nausea vomiting focal changes to vision smell taste or sound problems swallowing chest pain palpitations cough or shortness of breath.  He states he is functionally at baseline in regards to upper and lower extremity strength.  He admits to having a past medical history of paroxysmal atrial fibrillation around the time he had previous heart interventions.  He claims his cardiologist is Dr. Wright.  He admits to being on Plavix prior to admission as well as Crestor but has never been on full anticoagulation    Objective: No family at bedside.  Patient resting quite comfortably sitting up in bed.  His speech was fluent he was interactive and conversational and seem to be at baseline.  Nontoxic-appearing    Scheduled Meds:aspirin, 325 mg, Oral, Daily   Or  aspirin, 300 mg, Rectal, Daily  atorvastatin, 80 mg, Oral, Nightly  clopidogrel, 75 mg, Oral, Daily  pantoprazole, 40 mg, Oral, Daily  ramipril, 5 mg, Oral, Daily  sodium chloride, 10 mL, Intravenous, Q12H  sodium chloride, 10 mL, Intravenous, Q12H      Continuous Infusions:sodium chloride, 125 mL/hr, Last Rate: 125 mL/hr (21 1157)        Vital signs in last 24 hours:  Temp:  [97 °F (36.1 °C)-97.6 °F (36.4 °C)] 97.2 °F (36.2 °C)  Heart Rate:  [46-83] 47  Resp:  [16-18] 16  BP: (123-176)/(54-85) 133/64    Intake/Output:    Intake/Output Summary (Last 24 hours) at 2021 1237  Last data filed at 2021 0937  Gross per 24 hour   Intake 700 ml   Output 275 ml   Net 425 ml       Exam:  /64 (BP Location: Right arm, Patient Position: Lying)   Pulse (!) 47   Temp 97.2 °F (36.2 °C) (Oral)   Resp 16   Ht 172.7 cm (68\")   Wt 79.3 kg (174 lb 14.4 oz)   SpO2 95%   BMI " 26.59 kg/m²     General Appearance:    Alert, cooperative, fluent speech, AAOx3                          Head:    Normocephalic, without obvious abnormality, atraumatic                           Eyes:    PERRL, conjunctivae/corneas clear, EOM's intact, both eyes                         Throat:   Oral mucosa pink and moist                           Neck:   Supple, symmetrical, trachea midline, no JVD                         Lungs:    Clear to auscultation bilaterally, respirations unlabored                 Chest Wall:    No tenderness or deformity                          Heart:    Regular rate and rhythm, S1 and S2 normal                  Abdomen:     Soft, nontender, bowel sounds active                 Extremities:   Moving all with adequate strength, no cyanosis or edema                        Pulses:   Pulses palpable in all extremities                            Skin:   Skin is warm and dry                  Neurologic:   CNII-XII intact, no focal deficits noted     Data Review:  Labs in chart were reviewed.    Assessment:  Active Hospital Problems    Diagnosis  POA   • **CVA (cerebral vascular accident) (CMS/[facility]) [I63.9]  Unknown   • Stroke-like symptoms [R29.90]  Yes   • S/P CABG (coronary artery bypass graft) [Z95.1]  Not Applicable   • Gastroesophageal reflux disease [K21.9]  Yes   • Anxiety [F41.9]  Yes   • Benign essential hypertension [I10]  Yes   • Chronic coronary artery disease [I25.10]  Yes   • HLD (hyperlipidemia) [E78.5]  Yes      Resolved Hospital Problems   No resolved problems to display.       Plan:    Acute CVA with multiple infarcts in the distribution of the left MCA concerning for embolic phenomenon   -Patient comes to us on Plavix and currently is on aspirin and Plavix   -Neuro consult is pending   -My concern is that the strokes are coming from the cardiac source and after further discussing with the patient, he states he has had paroxysmal atrial fibrillation in the past and his  cardiologist is Dr. Wright   -TTE is pending and this patient may actually need a AR and will defer to cardiology as I have also placed a new consult   -I think we need to consider escalation of treatment to full anticoagulation if we are able to identify A. fib but we also may need to consider it based on a cryptogenic type stroke if we cannot prove an aortic source   -NIH has been 0   -Advance diet if passes bedside swallow    Saline lock fluids    HTN stable on ACE    HLD on high-dose statin formally on Crestor 10 mg    SCDs for DVT prophylaxis    GERD on PPI so no additional prophylaxis needed    Observation status transition to inpatient    Everardo Sumner MD  4/11/2021  12:37 EDT

## 2021-04-11 NOTE — CONSULTS
Neurology Note    Patient:  Madhu Santoro    YOB: 1945    REFERRING PHYSICIAN:  Amandeep Estes MD    CHIEF COMPLAINT:    stroke    HISTORY OF PRESENT ILLNESS:   The patient is a 75 y.o. male with CAD, A.flutter, s/p ablation, HTN, prior stroke, LCEA, on Plavix. He presented to ED yday c/o confusion since the day before, dropping things per wife, slow speech and fatigue. In ED NIHSS 2, /85, NSR, T97, CTH with mild chronic white matter changes. His symptoms have improved. MRI this am with several foci of restricted DWI in left MCA territory. No AF/flutter reported. He had a follow up carotid duplex last December that showed no recurrence of LICA disease, stable 50% NICHELLE stenosis. Cleared for po diet.    Past Medical History:  Past Medical History:   Diagnosis Date   • Anxiety    • Arthritis    • Atrial flutter (CMS/HCC)     Status post cavotricuspid isthmus ablation by Dr. Gustafson on 4/18/17   • Mandel esophagus    • Benign essential hypertension    • CAD (coronary artery disease)     3 vessel CABG 4/11/17 by Dr. Cortez: ROSARIO-prox LAD, SVG-OM1, SVG-OM3   • Carotid artery disease (CMS/HCC)     Status post carotid endarterectomy - USG 4/10/17: 50-59% NICHELLE, 1-15% LICA.    • Colonic polyp    • DDD (degenerative disc disease), lumbosacral    • GERD (gastroesophageal reflux disease)    • H/O bone density study 2013   • H/O complete eye exam 2014   • HLD (hyperlipidemia)    • Hypertension    • Lipid screening 05/31/2013   • Low back pain     physical therapy [facility] rehab 5-12-10   • Screening for prostate cancer 07/07/2015   • Stroke (CMS/HCC)        Past Surgical History:  Past Surgical History:   Procedure Laterality Date   • CARDIAC CATHETERIZATION N/A 4/10/2017    Procedure: Left Heart Cath;  Surgeon: Marjorie Healy MD;  Location: [facility] INVASIVE LOCATION;  Service:    • CARDIAC CATHETERIZATION N/A 4/10/2017    Procedure: Coronary angiography;  Surgeon: Marjorie Healy MD;  Location:  [facility] CATH INVASIVE LOCATION;  Service:    • CARDIAC CATHETERIZATION N/A 4/10/2017    Procedure: Left ventriculography;  Surgeon: Marjorie Healy MD;  Location: [facility] CATH INVASIVE LOCATION;  Service:    • CARDIAC ELECTROPHYSIOLOGY PROCEDURE N/A 4/18/2017    Procedure: Ablation atrial flutter;  Surgeon: Jose Antonio Gustafson MD;  Location: [facility] CATH INVASIVE LOCATION;  Service:    • CHOLECYSTECTOMY     • CHOLECYSTECTOMY WITH INTRAOPERATIVE CHOLANGIOGRAM N/A 9/7/2019    Procedure: Laparoscopic cholecystectomy with intraoperative cholangiogram;  Surgeon: Gauri Travis MD;  Location: [facility] MAIN OR;  Service: General   • COLONOSCOPY  01/06/2015    Diverticulosis, one TA   • COLONOSCOPY N/A 2/14/2019    tics, NBIH, adenomatous polyp x 2   • CORONARY ARTERY BYPASS GRAFT N/A 4/11/2017    Procedure: AR STERNOTOMY CORONARY ARTERY BYPASS GRAFT TIMES 3 USING LEFT INTERNAL MAMMARY ARTERY AND LEFT GREATER SAPHENOUS VEIN GRAFT PER ENDOSCOPIC VEIN HARVESTING AND PRP ;  Surgeon: Temo Cortez MD;  Location: [facility] OR;  Service:    • ENDOSCOPY  01/06/2015    HH, Mandel's esophagus   • ENDOSCOPY N/A 2/14/2019    Z line irregular, HH, Mandel's esophagus   • VASECTOMY         Social History:   Social History     Socioeconomic History   • Marital status:      Spouse name: Not on file   • Number of children: Not on file   • Years of education: Not on file   • Highest education level: Not on file   Tobacco Use   • Smoking status: Former Smoker   • Smokeless tobacco: Never Used   • Tobacco comment: CAFFEINE USE   Vaping Use   • Vaping Use: Never used   Substance and Sexual Activity   • Alcohol use: Yes     Comment: SOCIALLY   • Drug use: No   • Sexual activity: Yes     Partners: Female        Family History:   Family History   Problem Relation Age of Onset   • Heart disease Mother    • Heart attack Mother    • Stroke Mother    • Heart disease Father    • Heart attack Father    • Stroke Father    • Arthritis Other     • Diabetes Other    • Hypertension Other    • Kidney disease Other         stones   • Hypertension Sister    • Heart attack Brother    • Heart disease Brother    • No Known Problems Maternal Grandmother    • No Known Problems Maternal Grandfather    • No Known Problems Paternal Grandmother    • No Known Problems Paternal Grandfather    • No Known Problems Brother    • Heart disease Brother    • Heart attack Brother    • Diabetes Brother    • Pancreatic cancer Paternal Cousin        Medications Prior to Admission:    Prior to Admission medications    Medication Sig Start Date End Date Taking? Authorizing Provider   clopidogrel (PLAVIX) 75 MG tablet Take 1 tablet by mouth Daily. 11/3/20  Yes Damian Chen MD   HYDROcodone-acetaminophen (NORCO)  MG per tablet Take 1 tablet by mouth 2 (two) times a day. Take at least 6 hours apart DNF 4/3/2021 3/31/21  Yes Christy Luther APRN   Myrbetriq 25 MG tablet sustained-release 24 hour 24 hr tablet Take 25 mg by mouth Daily. 10/5/20  Yes ProviderGary MD   pantoprazole (PROTONIX) 40 MG EC tablet Take 1 tablet by mouth Daily. 11/3/20  Yes Damian Chen MD   ramipril (ALTACE) 5 MG capsule Take 1 capsule by mouth Daily. 2/13/21  Yes Damian Chen MD   rosuvastatin (CRESTOR) 10 MG tablet Take 1 tablet by mouth Daily. 11/3/20  Yes Damian Chen MD   nitroglycerin (NITROSTAT) 0.4 MG SL tablet Place 1 tablet under the tongue Every 5 (Five) Minutes As Needed for Chest Pain. Take no more than 3 doses in 15 minutes. 1/27/20   Damian Chen MD       Allergies:  Atorvastatin and Penicillins      Review of system  Review of Systems    Vitals:    04/11/21 0937   BP: 133/64   Pulse: (!) 47   Resp: 16   Temp: 97.2 °F (36.2 °C)   SpO2: 95%       Physical exam  Physical Exam  Constitutional:       Appearance: He is well-developed.   HENT:      Head: Normocephalic and atraumatic.   Eyes:      Extraocular Movements: Extraocular movements intact.      Pupils: Pupils are  equal, round, and reactive to light.   Cardiovascular:      Rate and Rhythm: Normal rate and regular rhythm.   Pulmonary:      Effort: Pulmonary effort is normal.   Neurological:      General: No focal deficit present.      Mental Status: He is alert and oriented to person, place, and time.      Deep Tendon Reflexes: Babinski sign absent on the right side. Babinski sign absent on the left side.      Comments: Speech clear, VFF, no facial droop, able to name and repeat, no limb drift. DTRs hypoactive.   Psychiatric:         Behavior: Behavior normal.         Thought Content: Thought content normal.           Lab Results   Component Value Date    WBC 5.42 2021    HGB 14.7 2021    HCT 46.9 2021    MCV 84.5 2021     2021     Lab Results   Component Value Date    GLUCOSE 83 2021    BUN 11 2021    CREATININE 0.76 2021    EGFRIFNONA 100 2021    EGFRIFAFRI 89 10/27/2020    BCR 14.5 2021    CO2 20.8 (L) 2021    CALCIUM 7.8 (L) 2021    PROTENTOTREF 6.6 10/27/2020    ALBUMIN 3.20 (L) 2021    LABIL2 1.5 10/27/2020    AST 17 2021    ALT 13 2021     Echo Complete w/Doppler and Color Flow  Order# [id number]  Reading physician: Marisol Molina MD Ordering physician: Amandeep Estes MD Study date: 4/10/21   Patient Information    Patient Name   Madhu Santoro MRN   [id number] Legal Sex   Male  (Age)   1945 (75 y.o.)   Admission Information    Admission Date/Time Discharge Date/Time Room/Bed   04/10/21  01:07 PM  P596/1   PACS Images     Show images for Adult Transthoracic Echo Complete w/ Color, Spectral and Contrast if Necessary Per Protocol    Sedation Narrator Report    Sedation Narrator Report      Interpretation Summary    · Estimated left ventricular EF = 57% Left ventricular systolic function is normal.  · Left ventricular diastolic function was normal.  · The right ventricular cavity is mildly dilated.  · The right  atrial cavity is mildly dilated.  · Mild mitral annular calcification is present. There is mild, bileaflet mitral valve thickening present. Trace mitral valve regurgitation is present. No significant mitral valve stenosis is present.  · No aortic valve regurgitation or stenosis is present. There is mild thickening of the aortic valve. The aortic valve appears trileaflet.     ECG 12 Lead  Order: [id number]  Status:  Final result   Visible to patient:  Yes (Prasad) Next appt:  04/14/2021 at 03:15 PM in Physical Therapy (Lisandro Elizabeth PTA)  Component   Ref Range & Units 4/10/21 1338   QT Interval   ms 427       Narrative & Impression    HEART RATE= 55  bpm  RR Interval= 1088  ms  WA Interval= 169  ms  P Horizontal Axis= 45  deg  P Front Axis= 65  deg  QRSD Interval= 107  ms  QT Interval= 427  ms  QRS Axis= -28  deg  T Wave Axis= 29  deg  - BORDERLINE ECG -  Sinus rhythm  Left ventricular hypertrophy  No change from prior tracing  Electronically Signed By: Xena Lozano ([facility] 11-Apr-2021 06:50:22  Date and Time of Study: 2021-04-10 13:38:03      Specimen Collected: 04/10/21 13:38               Radiological Studies:    Adult Transthoracic Echo Complete w/ Color, Spectral and Contrast if Necessary Per Protocol    Result Date: 4/10/2021  · Estimated left ventricular EF = 57% Left ventricular systolic function is normal. · Left ventricular diastolic function was normal. · The right ventricular cavity is mildly dilated. · The right atrial cavity is mildly dilated. · Mild mitral annular calcification is present. There is mild, bileaflet mitral valve thickening present. Trace mitral valve regurgitation is present. No significant mitral valve stenosis is present. · No aortic valve regurgitation or stenosis is present. There is mild thickening of the aortic valve. The aortic valve appears trileaflet.      CT Head Without Contrast    Result Date: 4/10/2021  EXAM:  CT HEAD WO CONTRAST-  HISTORY:  Stroke symptoms. Confusion,  dropping things.  COMPARISON:  MR brain with and without contrast 03/16/2015. CT head without contrast 03/16/2015.  TECHNIQUE: Noncontrast images of the brain were obtained. Reformatted images were reviewed. Radiation dose reduction techniques were utilized, including automated exposure control and exposure modulation based on body size.  FINDINGS:   The ventricles and sulci are within normal limits for patient age.  No acute intracranial hemorrhage or pathologic extra-axial collection is identified.  There is no midline shift or mass effect.  The basal cisterns are patent. There is mild small vessel ischemic disease, slightly progressed from 2015. Small chronic infarcts in the left frontal and parietal lobes are better assessed on prior MRI. There is calcific intracranial atherosclerosis with involvement of the posterior circulation.       No acute intracranial hemorrhage. Mild small vessel ischemic disease has slightly progressed from 2015. If there is clinical concern for acute infarction, this would be better assessed with MRI.  Discussed with Dr. Orozco at 2:05 PM.  This report was finalized on 4/10/2021 2:05 PM by Dr. Tawana Barreto M.D.      MRI Brain Without Contrast    Result Date: 4/11/2021  MRI BRAIN WO CONTRAST-  INDICATIONS: Transient ischemic attack  TECHNIQUE: Multiplanar multisequence magnetic resonance imaging of the brain.  COMPARISON: Head CT from 04/10/2021.  FINDINGS:  The diffusion-weighted images show multiple small (typically about 3 mm) foci of restricted diffusion in the left frontal and parietal lobes, with corresponding loss of signal on ADC mapping, compatible with multiple small acute infarcts that may reflect embolic phenomenon.  The midline structures appear unremarkable.  The brain parenchyma shows mild periventricular white matter T2 FLAIR signal hyperintensities compatible with chronic small vessel ischemic change. Small old lacunar infarcts are apparent in left corona radiata.   Flow voids in the major arteries at the base of the brain appear unremarkable.  The paranasal sinuses, mastoid air cells, and orbits appear unremarkable.       Multiple small acute infarcts in left MCA distribution that may represent embolic phenomenon.  Discussed by telephone with patient's nurse, Luisa, at time of interpretation, 0828, 04/11/2021.  This report was finalized on 4/11/2021 8:31 AM by Dr. Mukesh Bruce M.D.          During this visit the following were done:  Labs Reviewed [x]    Labs Ordered []    Radiology Reports Reviewed [x]    Radiology Ordered []    EKG, echo, and/or stress test reviewed [x]    EEG results reviewed  []    EEG reviewed and interpreted per myself   []    Discussed case with neurointerventionalist or neuroradiologist []    Referring Provider Records Reviewed []    ER Records Reviewed [x]    Hospital Records Reviewed [x]    History Obtained From Family []    Radiological images view and Interpreted per myself [x]    Case Discussed with referring provider []     Decision to obtain and request outside records  []        Assessment and Plan     Subacute scattered LMCA territory strokes 22 embolism, cardiac vs carotid source. H/O L CEA, carotid duplex stable in December. H/O A.flutter. Plavix failure. No TPA on time, resolved sym[ptoms.   - Observation on telemetry.   - DAPT, satin.   - CTA head and neck.   - TTE.   - Lipid panel A1C, P2Y12.   - Consider cardiology consult for recs re: AF monitoring and AC if no significant carotid disease.     Thanks.      Electronically signed by Savage Garcias MD on 4/11/2021 at 12:04 EDT

## 2021-04-11 NOTE — CONSULTS
Patient Name: Madhu Santoro  :1945  75 y.o.    Date of Admission: 4/10/2021  Encounter Provider: Marisol Molina MD  Date of Encounter Visit: 21  Place of Service: [facility] CARDIOLOGY  Referring Provider: Amandeep Estes MD  Patient Care Team:  Damian Chen MD as PCP - General (Family Medicine)  Jr Temo Cortez MD as Surgeon (Cardiothoracic Surgery)      Chief complaint: TIA      History of Present Illness:    This is a patient of Dr. Felix.  He has a history of coronary artery disease and bypass surgery, atrial flutter, hypertension, carotid artery disease, hyperlipidemia and a history of stroke.  Echocardiogram yesterday showed normal LV function with an EF of 57% he had some thickening of the aortic and the mitral valve but no significant regurgitation or stenosis.  Holter monitor in May 2020 was normal.    He denies any chest pain, shortness of breath or palpitations.  He had an use of his upper and lower extremities.  [facility] and said he was unable to do so.  Back to normal at this point.    Past Medical History:   Diagnosis Date   • Anxiety    • Arthritis    • Atrial flutter (CMS/HCC)     Status post cavotricuspid isthmus ablation by Dr. Gustafson on 17   • Mandel esophagus    • Benign essential hypertension    • CAD (coronary artery disease)     3 vessel CABG 17 by Dr. Cortez: ROSARIO-prox LAD, SVG-OM1, SVG-OM3   • Carotid artery disease (CMS/HCC)     Status post carotid endarterectomy - USG 4/10/17: 50-59% NICHELLE, 1-15% LICA.    • Colonic polyp    • DDD (degenerative disc disease), lumbosacral    • GERD (gastroesophageal reflux disease)    • H/O bone density study    • H/O complete eye exam    • HLD (hyperlipidemia)    • Hypertension    • Lipid screening 2013   • Low back pain     physical therapy [facility] rehab 5-12-10   • Screening for prostate cancer 2015   • Stroke (CMS/HCC)         Past Surgical History:   Procedure Laterality Date   • CARDIAC CATHETERIZATION N/A 4/10/2017    Procedure: Left Heart Cath;  Surgeon: Marjorie Healy MD;  Location:  [facility] CATH INVASIVE LOCATION;  Service:    • CARDIAC CATHETERIZATION N/A 4/10/2017    Procedure: Coronary angiography;  Surgeon: Marjorie Healy MD;  Location:  [facility] CATH INVASIVE LOCATION;  Service:    • CARDIAC CATHETERIZATION N/A 4/10/2017    Procedure: Left ventriculography;  Surgeon: Marjorie Healy MD;  Location:  [facility] CATH INVASIVE LOCATION;  Service:    • CARDIAC ELECTROPHYSIOLOGY PROCEDURE N/A 4/18/2017    Procedure: Ablation atrial flutter;  Surgeon: Jose Antonio Gustafson MD;  Location:  [facility] CATH INVASIVE LOCATION;  Service:    • CHOLECYSTECTOMY     • CHOLECYSTECTOMY WITH INTRAOPERATIVE CHOLANGIOGRAM N/A 9/7/2019    Procedure: Laparoscopic cholecystectomy with intraoperative cholangiogram;  Surgeon: Gauri Travis MD;  Location: [facility] MAIN OR;  Service: General   • COLONOSCOPY  01/06/2015    Diverticulosis, one TA   • COLONOSCOPY N/A 2/14/2019    tics, NBIH, adenomatous polyp x 2   • CORONARY ARTERY BYPASS GRAFT N/A 4/11/2017    Procedure: AR STERNOTOMY CORONARY ARTERY BYPASS GRAFT TIMES 3 USING LEFT INTERNAL MAMMARY ARTERY AND LEFT GREATER SAPHENOUS VEIN GRAFT PER ENDOSCOPIC VEIN HARVESTING AND PRP ;  Surgeon: Temo Cortez MD;  Location: [facility] MAIN OR;  Service:    • ENDOSCOPY  01/06/2015    HH, Mandel's esophagus   • ENDOSCOPY N/A 2/14/2019    Z line irregular, HH, Mandel's esophagus   • VASECTOMY           Prior to Admission medications    Medication Sig Start Date End Date Taking? Authorizing Provider   aluminum-magnesium hydroxide-simethicone (MAALOX MAX) 400-400-40 MG/5ML suspension Take 20 mL by mouth Every 6 (Six) Hours As Needed for Indigestion or Heartburn.   Yes Provider, MD Gary   clopidogrel (PLAVIX) 75 MG tablet Take 1 tablet by mouth Daily. 11/3/20  Yes DornsifeDamian Sesay MD   HYDROcodone-acetaminophen  (NORCO)  MG per tablet Take 1 tablet by mouth 2 (two) times a day. Take at least 6 hours apart DNF 4/3/2021 3/31/21  Yes Christy Luther APRN   Myrbetriq 25 MG tablet sustained-release 24 hour 24 hr tablet Take 25 mg by mouth Daily. 10/5/20  Yes ProviderGary MD   pantoprazole (PROTONIX) 40 MG EC tablet Take 1 tablet by mouth Daily. 11/3/20  Yes Damian Chen MD   ramipril (ALTACE) 5 MG capsule Take 1 capsule by mouth Daily. 2/13/21  Yes aDmian Chen MD   rosuvastatin (CRESTOR) 10 MG tablet Take 1 tablet by mouth Daily. 11/3/20  Yes aDmian Chen MD   nitroglycerin (NITROSTAT) 0.4 MG SL tablet Place 1 tablet under the tongue Every 5 (Five) Minutes As Needed for Chest Pain. Take no more than 3 doses in 15 minutes. 1/27/20   Damian Chen MD       Allergies   Allergen Reactions   • Atorvastatin Myalgia     Myalgia   • Penicillins Hives, Swelling and Rash       Social History     Socioeconomic History   • Marital status:      Spouse name: Not on file   • Number of children: Not on file   • Years of education: Not on file   • Highest education level: Not on file   Tobacco Use   • Smoking status: Former Smoker   • Smokeless tobacco: Never Used   • Tobacco comment: CAFFEINE USE   Vaping Use   • Vaping Use: Never used   Substance and Sexual Activity   • Alcohol use: Yes     Comment: SOCIALLY   • Drug use: No   • Sexual activity: Yes     Partners: Female       Family History   Problem Relation Age of Onset   • Heart disease Mother    • Heart attack Mother    • Stroke Mother    • Heart disease Father    • Heart attack Father    • Stroke Father    • Arthritis Other    • Diabetes Other    • Hypertension Other    • Kidney disease Other         stones   • Hypertension Sister    • Heart attack Brother    • Heart disease Brother    • No Known Problems Maternal Grandmother    • No Known Problems Maternal Grandfather    • No Known Problems Paternal Grandmother    • No Known Problems Paternal Grandfather     • No Known Problems Brother    • Heart disease Brother    • Heart attack Brother    • Diabetes Brother    • Pancreatic cancer Paternal Cousin        REVIEW OF SYSTEMS:   All other systems reviewed and negative.        Objective:     Vitals:    04/10/21 2031 04/11/21 0432 04/11/21 0912 04/11/21 0937   BP: 157/74 123/54 130/57 133/64   BP Location: Right arm Right arm  Right arm   Patient Position: Lying Lying  Lying   Pulse: 51 (!) 46 (!) 47 (!) 47   Resp: 18 18  16   Temp: 97.2 °F (36.2 °C)   97.2 °F (36.2 °C)   TempSrc: Oral   Oral   SpO2: 96% 95%  95%   Weight:       Height:         Body mass index is 26.59 kg/m².    Intake/Output Summary (Last 24 hours) at 4/11/2021 1408  Last data filed at 4/11/2021 1323  Gross per 24 hour   Intake 1700 ml   Output 675 ml   Net 1025 ml     Constitutional: He is oriented to person, place, and time. He appears well-developed. He does not appear ill.   HENT:   Head: Normocephalic and atraumatic. Head is without contusion.   Right Ear: Hearing normal. No drainage.   Left Ear: Hearing normal. No drainage.   Nose: No nasal deformity. No epistaxis.   Eyes: Lids are normal. Right eye exhibits no exudate. Left eye exhibits no exudate.  Neck: No JVD present. Carotid bruit is not present. No tracheal deviation present. No thyroid mass and no thyromegaly present.   Cardiovascular: Normal rate, regular rhythm and normal heart sounds.    Pulses:       Posterior tibial pulses are 2+ on the right side, and 2+ on the left side.   Pulmonary/Chest: Effort normal and breath sounds normal.   Abdominal: Soft. Normal appearance and bowel sounds are normal. There is no tenderness.   Musculoskeletal: Normal range of motion.        Right shoulder: He exhibits no deformity.        Left shoulder: He exhibits no deformity.   Neurological: He is alert and oriented to person, place, and time. He has normal strength.   Skin: Skin is warm, dry and intact. No rash noted.   Psychiatric: He has a normal mood  and affect. His behavior is normal. Thought content normal.   Vitals reviewed        Lab Review:     Results from last 7 days   Lab Units 04/11/21  0425   SODIUM mmol/L 140   POTASSIUM mmol/L 3.5   CHLORIDE mmol/L 110*   CO2 mmol/L 20.8*   BUN mg/dL 11   CREATININE mg/dL 0.76   CALCIUM mg/dL 7.8*   BILIRUBIN mg/dL 0.8   ALK PHOS U/L 77   ALT (SGPT) U/L 13   AST (SGOT) U/L 17   GLUCOSE mg/dL 83         Results from last 7 days   Lab Units 04/11/21  0425   WBC 10*3/mm3 5.42   HEMOGLOBIN g/dL 14.7   HEMATOCRIT % 46.9   PLATELETS 10*3/mm3 361     Results from last 7 days   Lab Units 04/10/21  1334   INR  1.19*   APTT seconds 30.8             I personally viewed and interpreted the patient's EKG/Telemetry data.        Assessment and Plan:       1.  History of stroke with now new symptoms of stroke.  2.  Coronary disease with history of bypass.  LV function is normal.  3.  History of atrial flutter.  Status post ablation.  Currently in sinus rhythm.  Not on anticoagulation.    I will tentatively plan for a transesophageal echocardiogram tomorrow morning.  If neurology feels this is not necessary, we can cancel it in the morning.    Marisol Molina MD  04/11/21  14:08 EDT

## 2021-04-12 ENCOUNTER — APPOINTMENT (OUTPATIENT)
Dept: CT IMAGING | Facility: HOSPITAL | Age: 76
End: 2021-04-12

## 2021-04-12 LAB
ANION GAP SERPL CALCULATED.3IONS-SCNC: 9 MMOL/L (ref 5–15)
BUN SERPL-MCNC: 12 MG/DL (ref 8–23)
BUN/CREAT SERPL: 15 (ref 7–25)
CALCIUM SPEC-SCNC: 8.3 MG/DL (ref 8.6–10.5)
CHLORIDE SERPL-SCNC: 112 MMOL/L (ref 98–107)
CHOLEST SERPL-MCNC: 106 MG/DL (ref 0–200)
CO2 SERPL-SCNC: 20 MMOL/L (ref 22–29)
CREAT SERPL-MCNC: 0.8 MG/DL (ref 0.76–1.27)
GFR SERPL CREATININE-BSD FRML MDRD: 94 ML/MIN/1.73
GLUCOSE BLDC GLUCOMTR-MCNC: 80 MG/DL (ref 70–130)
GLUCOSE SERPL-MCNC: 83 MG/DL (ref 65–99)
HBA1C MFR BLD: 5.3 % (ref 4.8–5.6)
HDLC SERPL-MCNC: 37 MG/DL (ref 40–60)
LDLC SERPL CALC-MCNC: 54 MG/DL (ref 0–100)
LDLC/HDLC SERPL: 1.5 {RATIO}
POTASSIUM SERPL-SCNC: 4 MMOL/L (ref 3.5–5.2)
SODIUM SERPL-SCNC: 141 MMOL/L (ref 136–145)
TRIGL SERPL-MCNC: 68 MG/DL (ref 0–150)
VLDLC SERPL-MCNC: 15 MG/DL (ref 5–40)

## 2021-04-12 PROCEDURE — 83036 HEMOGLOBIN GLYCOSYLATED A1C: CPT | Performed by: PSYCHIATRY & NEUROLOGY

## 2021-04-12 PROCEDURE — 70498 CT ANGIOGRAPHY NECK: CPT

## 2021-04-12 PROCEDURE — 82962 GLUCOSE BLOOD TEST: CPT

## 2021-04-12 PROCEDURE — 80048 BASIC METABOLIC PNL TOTAL CA: CPT | Performed by: HOSPITALIST

## 2021-04-12 PROCEDURE — 80061 LIPID PANEL: CPT | Performed by: PSYCHIATRY & NEUROLOGY

## 2021-04-12 PROCEDURE — 99232 SBSQ HOSP IP/OBS MODERATE 35: CPT | Performed by: PSYCHIATRY & NEUROLOGY

## 2021-04-12 PROCEDURE — 70496 CT ANGIOGRAPHY HEAD: CPT

## 2021-04-12 PROCEDURE — 99232 SBSQ HOSP IP/OBS MODERATE 35: CPT | Performed by: INTERNAL MEDICINE

## 2021-04-12 PROCEDURE — 0 IOPAMIDOL PER 1 ML: Performed by: INTERNAL MEDICINE

## 2021-04-12 RX ADMIN — IOPAMIDOL 95 ML: 755 INJECTION, SOLUTION INTRAVENOUS at 10:38

## 2021-04-12 RX ADMIN — APIXABAN 5 MG: 5 TABLET, FILM COATED ORAL at 20:45

## 2021-04-12 RX ADMIN — RAMIPRIL 5 MG: 5 CAPSULE ORAL at 11:15

## 2021-04-12 RX ADMIN — ASPIRIN 325 MG: 325 TABLET ORAL at 11:15

## 2021-04-12 RX ADMIN — SODIUM CHLORIDE, PRESERVATIVE FREE 10 ML: 5 INJECTION INTRAVENOUS at 11:15

## 2021-04-12 RX ADMIN — PANTOPRAZOLE SODIUM 40 MG: 40 TABLET, DELAYED RELEASE ORAL at 11:15

## 2021-04-12 RX ADMIN — CLOPIDOGREL 75 MG: 75 TABLET, FILM COATED ORAL at 11:15

## 2021-04-12 RX ADMIN — SODIUM CHLORIDE, PRESERVATIVE FREE 10 ML: 5 INJECTION INTRAVENOUS at 20:48

## 2021-04-12 RX ADMIN — HYDROCODONE BITARTRATE AND ACETAMINOPHEN 1 TABLET: 10; 325 TABLET ORAL at 20:58

## 2021-04-12 RX ADMIN — ATORVASTATIN CALCIUM 80 MG: 80 TABLET, FILM COATED ORAL at 20:45

## 2021-04-12 NOTE — PROGRESS NOTES
Continued Stay Note  [facility]     Patient Name: Madhu Santoro  MRN: [id number]  Today's Date: 4/12/2021    Admit Date: 4/10/2021    Discharge Plan     Row Name 04/12/21 1309       Plan    Plan Comments  Spoke with Hilary regarding Eliquis script sent to pharmacy for cost. CCP spoke with pharmacy and it is being filled free for the first month and will cost $45 monthly after the first fill. Outbound call to Hilary and updated her regarding the cost of the Eliquis. Jennifer DELEON RN CCP        Discharge Codes    No documentation.             Jennifer Vasques RN

## 2021-04-12 NOTE — PROGRESS NOTES
Name: Madhu Santoro ADMIT: 4/10/2021   : 1945  PCP: Damian Chen MD    MRN: [id number] LOS: 1 days   AGE/SEX: 75 y.o. male  ROOM: [facility]     Subjective   Subjective     Patient seen at bedside today.  Patient lying in bed.       Objective   Objective   Vital Signs  Temp:  [97.6 °F (36.4 °C)-98.5 °F (36.9 °C)] 98.5 °F (36.9 °C)  Heart Rate:  [47-70] 52  Resp:  [16-18] 16  BP: (142-174)/(60-78) 154/74  SpO2:  [94 %-97 %] 94 %  on   ;   Device (Oxygen Therapy): room air  Body mass index is 26.59 kg/m².  Physical Exam    General Appearance:    Alert, cooperative, no distress, AAOx3  Head:    Normocephalic, without obvious abnormality, atraumatic  Eyes:    PERRL, conjunctiva/corneas clear, EOM's intact, both eyes  Throat:   Lips, tongue, gums normal; oral mucosa pink and moist  Neck:   Supple, symmetrical, trachea midline, no JVD  Lungs:     Clear to auscultation bilaterally, respirations unlabored  Chest Wall:    No tenderness or deformity   Heart:    Regular rate and rhythm, S1 and S2 normal, no murmur,no  Rub or gallop  Abdomen:     Soft, non-tender, bowel sounds active, no masses, no organomegaly   Extremities:   Extremities normal, atraumatic, no cyanosis or edema  Pulses:   Pulses palpable in all extremities  Skin:   Skin is warm and dry,  no rashes or palpable lesions  Neurologic:   CNII-XII intact, motor strength grossly intact, sensation grossly intact to light touch, no focal deficits noted        Results Review     I reviewed the patient's new clinical results.  Results from last 7 days   Lab Units 21  0425 04/10/21  1334   WBC 10*3/mm3 5.42 7.45   HEMOGLOBIN g/dL 14.7 17.5   PLATELETS 10*3/mm3 361 467*     Results from last 7 days   Lab Units 21  0544 21  0425 04/10/21  1334   SODIUM mmol/L 141 140 142   POTASSIUM mmol/L 4.0 3.5 4.2   CHLORIDE mmol/L 112* 110* 104   CO2 mmol/L 20.0* 20.8* 27.3   BUN mg/dL 12 11 15   CREATININE mg/dL 0.80 0.76 0.99   GLUCOSE mg/dL 83 83 101*    Estimated Creatinine Clearance: 88.1 mL/min (by C-G formula based on SCr of 0.8 mg/dL).  Results from last 7 days   Lab Units 04/11/21  0425 04/10/21  1334   ALBUMIN g/dL 3.20* 4.10   BILIRUBIN mg/dL 0.8 0.9   ALK PHOS U/L 77 102   AST (SGOT) U/L 17 25   ALT (SGPT) U/L 13 13     Results from last 7 days   Lab Units 04/12/21  0544 04/11/21  0425 04/10/21  1334   CALCIUM mg/dL 8.3* 7.8* 9.6   ALBUMIN g/dL  --  3.20* 4.10       COVID19   Date Value Ref Range Status   04/10/2021 Not Detected Not Detected - Ref. Range Final     Hemoglobin A1C   Date/Time Value Ref Range Status   04/12/2021 0544 5.30 4.80 - 5.60 % Final     Glucose   Date/Time Value Ref Range Status   04/12/2021 0800 80 70 - 130 mg/dL Final   04/11/2021 2206 79 70 - 130 mg/dL Final   04/10/2021 1727 78 70 - 130 mg/dL Final       CT Angiogram Head, CT Angiogram Neck  Narrative: CT ANGIOGRAM HEAD-, CT ANGIOGRAM NECK-     CLINICAL HISTORY: 75-year-old male with history of multiple tiny acute  left cerebral infarcts. Confusion. Slurred speech. Fatigue.     TECHNIQUE: Transverse 3 mm thick images were acquired from the base of  the skull to the vertex without IV contrast. Subsequently, spiral CT  images were obtained through the neck and head during rapid IV injection  of contrast and were reconstructed in 1 mm thick axial slices. Multiple  coronal and sagittal and 3-D reconstructions were produced.     Radiation dose reduction techniques were utilized, including automated  exposure control and exposure modulation based on body size.     COMPARISON: CT scan of the head dated 04/10/2021. MRI brain dated  04/11/2021. CTA of the neck and head dated 03/16/2015.     FINDINGS: The brain and ventricular system appear structurally normal.  There is mild patchy ill-defined diminished attenuation in the white  matter of both cerebral hemispheres that appears similar on the previous  CT scan. This is consistent with sequela of mild small vessel chronic  ischemic  change. No acute infarct or hemorrhage is identified on the CT  images. The tiny punctate areas of acute infarction in the left cerebral  hemisphere shown on the MRI study remain occult on CT, as expected.     There is normal branching of the great vessels from the aortic arch  without NASCET significant stenosis. No stenoses are evident within  either common or external or internal carotid artery in the neck.  Multiple small calcified atherosclerotic plaques at both carotid  bifurcations result in no significant luminal narrowing. The vertebral  arteries are codominant and are both widely patent throughout their  course in the neck and head. There are no intracranial stenoses or  occlusions.     Postcontrast images of the brain parenchyma demonstrate no enhancing  lesions.     There is normal symmetric brain perfusion. No areas of ischemia or  developing infarction are identified.     Impression: Calcified atherosclerotic plaques at both carotid  bifurcations that result in no significant stenoses. Evidence of mild  small vessel chronic ischemic change as described. Otherwise  unremarkable CTA of the neck and head.     This report was finalized on 4/12/2021 1:09 PM by Dr. Cirilo Orr M.D.       Scheduled Medications  apixaban, 5 mg, Oral, Q12H  atorvastatin, 80 mg, Oral, Nightly  pantoprazole, 40 mg, Oral, Daily  ramipril, 5 mg, Oral, Daily  sodium chloride, 10 mL, Intravenous, Q12H  sodium chloride, 10 mL, Intravenous, Q12H    Infusions   Diet  Diet Regular; Thin; Cardiac       Assessment/Plan     Active Hospital Problems    Diagnosis  POA   • **CVA (cerebral vascular accident) (CMS/[facility]) [I63.9]  Unknown   • Stroke-like symptoms [R29.90]  Yes   • S/P CABG (coronary artery bypass graft) [Z95.1]  Not Applicable   • Gastroesophageal reflux disease [K21.9]  Yes   • Anxiety [F41.9]  Yes   • Benign essential hypertension [I10]  Yes   • Chronic coronary artery disease [I25.10]  Yes   • HLD (hyperlipidemia) [E78.5]   Yes      Resolved Hospital Problems   No resolved problems to display.       75 y.o. male admitted with CVA (cerebral vascular accident) (CMS/[facility]).    Assessment and plan  1. Acute CVA with multiple infarcts in the distribution of left MCA concerning for embolic phenomena.  Patient currently seen by neurology and cardiology service.  We will follow recommendations.  Plans for systemic anticoagulation with Eliquis per Cardiology.  He would continue aspirin and Eliquis.  Patient is also noted to be on high-intensity statin therapy.  2.  Coronary artery disease, status post CABG in the past,  Continue aspirin and patient is currently also anticoagulated with Eliquis.   3.  Gastroesophageal reflux disease,  Continue home PPI therapy.  4. Hypertension,   Continue ramipril.  5.  Code status is full code.  DVT prophylaxis.      Amandeep Estes MD  [facility]  04/12/21  18:28 EDT

## 2021-04-12 NOTE — SIGNIFICANT NOTE
04/12/21 0933   OTHER   Discipline speech language pathologist   Rehab Time/Intention   Session Not Performed other (see comments)  (Discussed with RN. Patient NPO for possible AR. Passed swallow screen and NIH 0. SLP to follow for need for clinical swallow eval as patient appropriate for PO.)

## 2021-04-12 NOTE — PLAN OF CARE
Goal Outcome Evaluation:   VSS. CTA completed. Eliquis started today. Prob DC home in AM. NIH-0. Amb ad donnell in room. Will continue to monitor

## 2021-04-12 NOTE — PROGRESS NOTES
LOS: 1 day   Patient Care Team:  Damian Chen MD as PCP - General (Family Medicine)  Jr Temo Cortez MD as Surgeon (Cardiothoracic Surgery)    Chief Complaint: Follow-up for acute CVA, coronary artery disease status post CABG, atrial flutter, status post ablation.    Interval History: Feels better.  Able to move his extremities.  No chest pain or shortness of breath.    Vital Signs:  Temp:  [97.5 °F (36.4 °C)-97.7 °F (36.5 °C)] 97.6 °F (36.4 °C)  Heart Rate:  [47-64] 64  Resp:  [15-18] 16  BP: (128-174)/(60-78) 174/78    Intake/Output Summary (Last 24 hours) at 4/12/2021 1213  Last data filed at 4/12/2021 0700  Gross per 24 hour   Intake 1000 ml   Output 1525 ml   Net -525 ml       Physical Exam:   General Appearance:    No acute distress, alert and oriented x4   Lungs:     Clear to auscultation bilaterally     Heart:    Regular rhythm and normal rate.  No murmurs, gallops, or       rubs.   Abdomen:     Soft, nontender, nondistended.    Extremities:   Moves all extremities well.  No clubbing, cyanosis, or edema.     Results Review:    Results from last 7 days   Lab Units 04/12/21  0544   SODIUM mmol/L 141   POTASSIUM mmol/L 4.0   CHLORIDE mmol/L 112*   CO2 mmol/L 20.0*   BUN mg/dL 12   CREATININE mg/dL 0.80   GLUCOSE mg/dL 83   CALCIUM mg/dL 8.3*         Results from last 7 days   Lab Units 04/11/21  0425   WBC 10*3/mm3 5.42   HEMOGLOBIN g/dL 14.7   HEMATOCRIT % 46.9   PLATELETS 10*3/mm3 361     Results from last 7 days   Lab Units 04/10/21  1334   INR  1.19*   APTT seconds 30.8     Results from last 7 days   Lab Units 04/12/21  0544   CHOLESTEROL mg/dL 106         Results from last 7 days   Lab Units 04/12/21  0544   CHOLESTEROL mg/dL 106   TRIGLYCERIDES mg/dL 68   HDL CHOL mg/dL 37*   LDL CHOL mg/dL 54       I reviewed the patient's new clinical results.        Assessment:  1.  Acute left MCA CVA, likely embolic  2.  Carotid artery disease, status post left CEA  3.  Typical atrial flutter, status post  cavotricuspid isthmus ablation on 4/18/2017  4.  Coronary artery disease, status post three-vessel CABG on 4/11/2017  5.  Hypertension  6.  Hyperlipidemia  7.  Normal ejection fraction 55 to 60%    Plan:  -His CT angiogram of the head and neck is pending.  If he does not have significant vascular or recurrent left carotid artery disease, I would favor systemic anticoagulation with Eliquis.  I feel there is a high likelihood that he could have atrial fibrillation as he has had atrial flutter in the past.  This has not been diagnosed, although the stroke had an embolic pattern, and I feel that systemic anticoagulation is his best option for future protection.    -If the CT angiogram does not show significant carotid artery disease, I will stop the Plavix and add Eliquis.  He would be on 81 mg of aspirin and Eliquis 5 mg twice a day.  He did have significant bruising when on dual antiplatelet therapy in the past, and this obviously would need to be watched closely.    -I do not feel that a transesophageal echocardiogram will change his clinical management, as I am recommending systemic anticoagulation anyway (pending the carotid assessment).    -I discussed all this in detail with him today, and he was in agreement with the plan.    Eric Wright MD  04/12/21  12:13 EDT

## 2021-04-12 NOTE — CONSULTS
"DOS: 2021  NAME: Madhu Santoro   : 1945  PCP: Damian Chen MD  Chief Complaint   Patient presents with   • Altered Mental Status       Chief complaint: Patient is feeling remarkably better at this point and wants to go home.  Subjective: When I came to see him he was getting up and ambulating independently going to use the restroom.  He was able to follow one-step, two-step and three-step commands and also able to communicate verbally without any hesitation.  He has already been seen by the cardiologist who does not think a transesophageal echocardiogram is necessary at this point and he recommended Eliquis 5 mg twice daily.  For that reason the baby aspirin and the Plavix will be stopped.    Objective:  Vital signs: /73 (BP Location: Left arm, Patient Position: Sitting)   Pulse 70   Temp 97.8 °F (36.6 °C) (Oral)   Resp 16   Ht 172.7 cm (68\")   Wt 79.3 kg (174 lb 14.4 oz)   SpO2 96%   BMI 26.59 kg/m²    Gen: NAD, vitals reviewed  MS: oriented x3, recent/remote memory intact, normal attention/concentration, language intact, no neglect.  CN: visual acuity grossly normal, PERRL, EOMI, no facial droop, no dysarthria  Motor: 5/5 throughout upper and lower extremities, normal tone  Sensory: intact to light touch all 4 ext.  Deep tendon reflexes: 2+ bilaterally.  Toes are downgoing bilaterally.  Coordination: Normal finger-to-nose.  Balance and gait: Patient is able to get up and ambulate independently and the Romberg is negative and the Dial balance test is also negative.    ROS:  No weakness, numbness  No fevers, chills  Musculoskeletal: Denies any aches and pains.  Dermatological: Denies any skin rashes.  Cardiovascular: Denies any chest pain or palpitations.    The NIH stroke scale is 0.      Laboratory results:  Lab Results   Component Value Date    GLUCOSE 83 2021    CALCIUM 8.3 (L) 2021     2021    K 4.0 2021    CO2 20.0 (L) 2021     (H) " 04/12/2021    BUN 12 04/12/2021    CREATININE 0.80 04/12/2021    EGFRIFAFRI 89 10/27/2020    EGFRIFNONA 94 04/12/2021    BCR 15.0 04/12/2021    ANIONGAP 9.0 04/12/2021     Lab Results   Component Value Date    WBC 5.42 04/11/2021    HGB 14.7 04/11/2021    HCT 46.9 04/11/2021    MCV 84.5 04/11/2021     04/11/2021     Lab Results   Component Value Date    LDL 54 04/12/2021    LDL 73 10/27/2020    LDL 71 07/13/2020         Lab 04/12/21  0544   HEMOGLOBIN A1C 5.30        Review of labs: The hemoglobin A1c was reviewed and seems to be normal.    Review and interpretation of imaging: Reviewed the imaging studies which showed multiple areas of cardioembolic infarcts.  IMPRESSION:     Multiple small acute infarcts in left MCA distribution that may  represent embolic phenomenon.     Workup to date: Patient had carotid Dopplers which were unremarkable and also the echocardiogram that was unremarkable.  Diagnoses:  Patient initially had cerebrovascular event possibly cardioembolic in nature from paroxysmal atrial flutter.    Comment: Discussed with the cardiologist and the patient will be on Eliquis 5 mg twice daily with food.  He will be getting the first dose today and the second dose tomorrow and after the 2 doses he will be fully anticoagulated and can go home.    Plan:  1.  Eliquis 5 mg twice daily with food.  2.  Atorvastatin 80 mg daily.  3.  Once the patient is medically stable he can be discharged home and followed up as outpatient.    Discussed with with the patient and the nursing staff and spent half an hour in face-to-face evaluation and management of the patient and will be signing off at this point.

## 2021-04-12 NOTE — SIGNIFICANT NOTE
04/12/21 1424   OTHER   Discipline occupational therapist   Rehab Time/Intention   Session Not Performed   (pt on own in bathroom (RN aware and OK), pt denies visual, sensation, coordination, UE function deficits.  Pt reports admit symptoms resolved.  no OT eval needs.  RN aware.)

## 2021-04-12 NOTE — PROGRESS NOTES
Discharge Planning Assessment  [facility]     Patient Name: Madhu Santoro  MRN: [id number]  Today's Date: 4/12/2021    Admit Date: 4/10/2021    Discharge Needs Assessment     Row Name 04/12/21 2097       Living Environment    Lives With  spouse    Current Living Arrangements  home/apartment/condo    Potentially Unsafe Housing Conditions  other (see comments) no concerns    Primary Care Provided by  self    Provides Primary Care For  no one    Family Caregiver if Needed  spouse    Quality of Family Relationships  involved;helpful;supportive    Able to Return to Prior Arrangements  yes       Resource/Environmental Concerns    Resource/Environmental Concerns  none    Transportation Concerns  car, none       Transition Planning    Patient/Family Anticipates Transition to  home with family    Patient/Family Anticipated Services at Transition  none    Transportation Anticipated  family or friend will provide       Discharge Needs Assessment    Readmission Within the Last 30 Days  no previous admission in last 30 days    Equipment Currently Used at Home  none    Concerns to be Addressed  no discharge needs identified;denies needs/concerns at this time    Anticipated Changes Related to Illness  none    Equipment Needed After Discharge  none        Discharge Plan     Row Name 04/12/21 7346       Plan    Plan  Home with spouse    Patient/Family in Agreement with Plan  yes    Plan Comments  CCP met with patient at bedside. CCP role explained and discharge planning discussed. Face sheet verified and IMM noted. Patient patient’s PCP is Dr. Kam. Patient lives with his wife, with no exterior steps. Patient’s bedroom and bathroom are on the main level. Patient is independent with his ADLs and does not use DME. Patient has not used home health or been to SNF. Patient does not anticipate any discharge needs. Patient plans to return home with his spouse. CCP discussed Eliquis cost of $45 a month; patient agreeable to cost.  CCP will follow for any needs to arise. Adrienne SCHULTZ    Row Name 04/12/21 2569       Plan    Plan Comments  Spoke with Hilayr regarding Eliquis script sent to [facility] for cost. CCP spoke with pharmacy and it is being filled free for the first month and will cost $45 monthly after the first fill. Outbound call to Hilary and updated her regarding the cost of the Eliquis. Jennifer DELEON RN CCP        Continued Care and Services - Admitted Since 4/10/2021    Coordination has not been started for this encounter.         Demographic Summary     Row Name 04/12/21 5982       General Information    Admission Type  inpatient    Arrived From  emergency department    Required Notices Provided  Important Message from Medicare    Referral Source  admission list    Reason for Consult  discharge planning    Preferred Language  English     Used During This Interaction  no        Functional Status     Row Name 04/12/21 6089       Functional Status    Usual Activity Tolerance  good    Current Activity Tolerance  good       Functional Status, IADL    Medications  independent    Meal Preparation  independent    Housekeeping  independent    Laundry  independent    Shopping  independent       Mental Status    General Appearance WDL  WDL       Mental Status Summary    Recent Changes in Mental Status/Cognitive Functioning  no changes        Psychosocial    No documentation.       Abuse/Neglect    No documentation.       Legal    No documentation.       Substance Abuse    No documentation.       Patient Forms    No documentation.           ANTOINE Mckeon

## 2021-04-13 ENCOUNTER — READMISSION MANAGEMENT (OUTPATIENT)
Dept: CALL CENTER | Facility: HOSPITAL | Age: 76
End: 2021-04-13

## 2021-04-13 VITALS
RESPIRATION RATE: 16 BRPM | HEART RATE: 48 BPM | WEIGHT: 174.9 LBS | OXYGEN SATURATION: 94 % | TEMPERATURE: 97.7 F | DIASTOLIC BLOOD PRESSURE: 61 MMHG | BODY MASS INDEX: 26.51 KG/M2 | HEIGHT: 68 IN | SYSTOLIC BLOOD PRESSURE: 141 MMHG

## 2021-04-13 LAB
ANION GAP SERPL CALCULATED.3IONS-SCNC: 9.7 MMOL/L (ref 5–15)
BASOPHILS # BLD AUTO: 0.02 10*3/MM3 (ref 0–0.2)
BASOPHILS NFR BLD AUTO: 0.3 % (ref 0–1.5)
BUN SERPL-MCNC: 12 MG/DL (ref 8–23)
BUN/CREAT SERPL: 15 (ref 7–25)
CALCIUM SPEC-SCNC: 8 MG/DL (ref 8.6–10.5)
CHLORIDE SERPL-SCNC: 108 MMOL/L (ref 98–107)
CO2 SERPL-SCNC: 21.3 MMOL/L (ref 22–29)
CREAT SERPL-MCNC: 0.8 MG/DL (ref 0.76–1.27)
DEPRECATED RDW RBC AUTO: 44.5 FL (ref 37–54)
EOSINOPHIL # BLD AUTO: 0.13 10*3/MM3 (ref 0–0.4)
EOSINOPHIL NFR BLD AUTO: 1.7 % (ref 0.3–6.2)
ERYTHROCYTE [DISTWIDTH] IN BLOOD BY AUTOMATED COUNT: 14.9 % (ref 12.3–15.4)
GFR SERPL CREATININE-BSD FRML MDRD: 94 ML/MIN/1.73
GLUCOSE SERPL-MCNC: 76 MG/DL (ref 65–99)
HCT VFR BLD AUTO: 48 % (ref 37.5–51)
HGB BLD-MCNC: 16 G/DL (ref 13–17.7)
IMM GRANULOCYTES # BLD AUTO: 0.07 10*3/MM3 (ref 0–0.05)
IMM GRANULOCYTES NFR BLD AUTO: 0.9 % (ref 0–0.5)
LYMPHOCYTES # BLD AUTO: 1.29 10*3/MM3 (ref 0.7–3.1)
LYMPHOCYTES NFR BLD AUTO: 16.8 % (ref 19.6–45.3)
MCH RBC QN AUTO: 27.6 PG (ref 26.6–33)
MCHC RBC AUTO-ENTMCNC: 33.3 G/DL (ref 31.5–35.7)
MCV RBC AUTO: 82.9 FL (ref 79–97)
MONOCYTES # BLD AUTO: 1.12 10*3/MM3 (ref 0.1–0.9)
MONOCYTES NFR BLD AUTO: 14.6 % (ref 5–12)
NEUTROPHILS NFR BLD AUTO: 5.06 10*3/MM3 (ref 1.7–7)
NEUTROPHILS NFR BLD AUTO: 65.7 % (ref 42.7–76)
NRBC BLD AUTO-RTO: 0 /100 WBC (ref 0–0.2)
PLATELET # BLD AUTO: 364 10*3/MM3 (ref 140–450)
PMV BLD AUTO: 10.5 FL (ref 6–12)
POTASSIUM SERPL-SCNC: 3.9 MMOL/L (ref 3.5–5.2)
RBC # BLD AUTO: 5.79 10*6/MM3 (ref 4.14–5.8)
SODIUM SERPL-SCNC: 139 MMOL/L (ref 136–145)
WBC # BLD AUTO: 7.69 10*3/MM3 (ref 3.4–10.8)

## 2021-04-13 PROCEDURE — 80048 BASIC METABOLIC PNL TOTAL CA: CPT | Performed by: INTERNAL MEDICINE

## 2021-04-13 PROCEDURE — 85025 COMPLETE CBC W/AUTO DIFF WBC: CPT | Performed by: INTERNAL MEDICINE

## 2021-04-13 RX ORDER — ATORVASTATIN CALCIUM 80 MG/1
80 TABLET, FILM COATED ORAL NIGHTLY
Qty: 30 TABLET | Refills: 0 | Status: SHIPPED | OUTPATIENT
Start: 2021-04-13 | End: 2021-04-13 | Stop reason: HOSPADM

## 2021-04-13 RX ORDER — ROSUVASTATIN CALCIUM 20 MG/1
20 TABLET, COATED ORAL DAILY
Qty: 30 TABLET | Refills: 0 | Status: SHIPPED | OUTPATIENT
Start: 2021-04-13 | End: 2021-05-19 | Stop reason: SDUPTHER

## 2021-04-13 RX ORDER — ASPIRIN 81 MG/1
81 TABLET ORAL DAILY
Qty: 30 TABLET | Refills: 0 | Status: SHIPPED | OUTPATIENT
Start: 2021-04-13 | End: 2023-01-05

## 2021-04-13 RX ADMIN — PANTOPRAZOLE SODIUM 40 MG: 40 TABLET, DELAYED RELEASE ORAL at 08:48

## 2021-04-13 RX ADMIN — HYDROCODONE BITARTRATE AND ACETAMINOPHEN 1 TABLET: 10; 325 TABLET ORAL at 08:48

## 2021-04-13 RX ADMIN — APIXABAN 5 MG: 5 TABLET, FILM COATED ORAL at 08:48

## 2021-04-13 RX ADMIN — RAMIPRIL 5 MG: 5 CAPSULE ORAL at 08:48

## 2021-04-13 RX ADMIN — SODIUM CHLORIDE, PRESERVATIVE FREE 10 ML: 5 INJECTION INTRAVENOUS at 08:48

## 2021-04-13 NOTE — PROGRESS NOTES
Patient was counseled extensively on Eliquis including the followin) Eliquis's indication, mechanism of action, and dosing  2) Enforced the importance of taking Eliquis as instructed every day and that it is a twice a day medication.  3) Explained possible side effects of Eliquis therapy, including increased risk of bleeding, s/sx of bleeding, and s/sx of any additional clots/PE/CVA.   4) Emphasized the importance of going to the emergency room if any of the following occur: Falling and hitting your head; noticing bright red blood in urine or dark/tarry stools; vomiting up blood or vomit has a coffee-ground like texture; coughing up blood  5) Discussed the importance of speaking with physician/pharmacist when starting any new medications to check for drug interactions with Eliquis  6) Discussed the importance of informing any surgeon or proceduralist that you are on Eliquis and may need to go off prior to the procedure (including spinal/epidural procedures)  7) Discussed what to do about a missed dose (Take as soon as you remember and if it is closer to the time for your next dose, skip the missed dose and go back to your normal time; DO NOT double up doses)  8) Instructed the pt not to take or discontinue any medications without informing his physician/pharmacist     I also explained to the patient that this medication may have a high copay associated with it and to let the cardiologist know if it is unaffordable.     Patient expressed understanding and had no further questions.       Gary Forte, [facility]    COVID-19 Precautions:  -The patient was wearing a mask during the interview session.  -I remained 6 feet away from the patient and was not present in the room for longer than 15 minutes.  -I was wearing a face mask and eye protection during the interview session.

## 2021-04-13 NOTE — DISCHARGE SUMMARY
[facility]    Date of Discharge:  4/13/2021    PCP: Damian Chen MD    Discharge Diagnosis:   Active Hospital Problems    Diagnosis  POA   • **CVA (cerebral vascular accident) (CMS/[facility]) [I63.9]  Unknown   • Stroke-like symptoms [R29.90]  Yes   • S/P CABG (coronary artery bypass graft) [Z95.1]  Not Applicable   • Gastroesophageal reflux disease [K21.9]  Yes   • Anxiety [F41.9]  Yes   • Benign essential hypertension [I10]  Yes   • Chronic coronary artery disease [I25.10]  Yes   • HLD (hyperlipidemia) [E78.5]  Yes      Resolved Hospital Problems   No resolved problems to display.          Consults     Date and Time Order Name Status Description    4/11/2021 12:40 PM Inpatient Cardiology Consult Completed     4/10/2021  5:33 PM Inpatient Neurology Consult Stroke Completed     4/10/2021  2:07 PM [facility] (on-call MD unless specified) Details Completed         Hospital Course  75-year-old male, presented to the hospital, he did have strokelike symptoms at the time of presentation.  Patient was diagnosed to have acute CVA with multiple infarcts in the distribution of left MCA concerning for embolic phenomena.  Patient was seen by cardiology and neurology service.  After neurology evaluation, patient was started on high intensity statin therapy.  Patient also was seen by cardiology, since embolic phenomena was suspected, patient was recommended to be started on systemic anticoagulation.  Patient will be on Eliquis.  We will also continue aspirin low-dose.  Patient will follow up closely with neurology and PCP on outpatient basis.  I have seen and examined patient at bedside, total time spent on discharge day management is more than 30 minutes.  Plan of care was discussed with him and he verbalized understanding.    Condition on Discharge: Improved.     Temp:  [97.4 °F (36.3 °C)-98.5 °F (36.9 °C)] 97.9 °F (36.6 °C)  Heart Rate:  [44-70] 58  Resp:  [16] 16  BP:  (138-174)/(67-81) 149/67  Body mass index is 26.59 kg/m².    Physical Exam     General Appearance:    Alert, cooperative, no distress, AAOx3  Head:    Normocephalic, without obvious abnormality, atraumatic  Eyes:    PERRL, conjunctiva/corneas clear, EOM's intact, both eyes  Throat:   Lips, tongue, gums normal; oral mucosa pink and moist  Neck:   Supple, symmetrical, trachea midline, no JVD  Lungs:     Clear to auscultation bilaterally, respirations unlabored  Chest Wall:    No tenderness or deformity   Heart:    Regular rate and rhythm, S1 and S2 normal, no murmur,no  Rub or gallop  Abdomen:     Soft, non-tender, bowel sounds active, no masses, no organomegaly   Extremities:   Extremities normal, atraumatic, no cyanosis or edema  Pulses:   Pulses palpable in all extremities  Skin:   Skin is warm and dry,  no rashes or palpable lesions  Neurologic:   CNII-XII intact, motor strength grossly intact, sensation grossly intact to light touch, no focal deficits noted      Disposition: Home or Self Care       Discharge Medications      New Medications      Instructions Start Date   aspirin 81 MG EC tablet   81 mg, Oral, Daily      atorvastatin 80 MG tablet  Commonly known as: LIPITOR   80 mg, Oral, Nightly      Eliquis 5 MG tablet tablet  Generic drug: apixaban   5 mg, Oral, 2 Times Daily         Continue These Medications      Instructions Start Date   aluminum-magnesium hydroxide-simethicone 400-400-40 MG/5ML suspension  Commonly known as: MAALOX MAX   20 mL, Oral, Every 6 Hours PRN      HYDROcodone-acetaminophen  MG per tablet  Commonly known as: NORCO   1 tablet, Oral, 2 times daily, Take at least 6 hours apart DNF 4/3/2021      Myrbetriq 25 MG tablet sustained-release 24 hour 24 hr tablet  Generic drug: Mirabegron ER   25 mg, Oral, Daily      nitroglycerin 0.4 MG SL tablet  Commonly known as: NITROSTAT   0.4 mg, Sublingual, Every 5 Minutes PRN, Take no more than 3 doses in 15 minutes.       pantoprazole 40 MG EC  tablet  Commonly known as: PROTONIX   40 mg, Oral, Daily      ramipril 5 MG capsule  Commonly known as: ALTACE   5 mg, Oral, Daily         Stop These Medications    clopidogrel 75 MG tablet  Commonly known as: PLAVIX     rosuvastatin 10 MG tablet  Commonly known as: CRESTOR             Additional Instructions for the Follow-ups that You Need to Schedule     Discharge Follow-up with PCP   As directed       Currently Documented PCP:    Damian Chen MD    PCP Phone Number:    [phone]     Follow Up Details: Follow-up with PCP in 7 days.         Discharge Follow-up with Specialty: Follow-up with neurology in 3 weeks.   As directed      Specialty: Follow-up with neurology in 3 weeks.           Follow-up Information     Damian Chen MD .    Specialty: Family Medicine  Why: Follow-up with PCP in 7 days.  Contact information:  [address] [address] [address] [phone]                     Amandeep Estes MD  04/13/21  10:43 EDT    Discharge time spent greater than 30 minutes.

## 2021-04-13 NOTE — OUTREACH NOTE
Prep Survey      Responses   [facility] patient discharged from?  [place]   Is LACE score < 7 ?  No   Emergency Room discharge w/ pulse ox?  No   Eligibility  [facility]   Date of Admission  04/10/21   Date of Discharge  04/13/21   Discharge Disposition  Home or Self Care   Discharge diagnosis  CVA   Does the patient have one of the following disease processes/diagnoses(primary or secondary)?  Stroke (TIA)   Does the patient have Home health ordered?  No   Is there a DME ordered?  No   Prep survey completed?  Yes          Marta Guzmán RN

## 2021-04-14 ENCOUNTER — TRANSITIONAL CARE MANAGEMENT TELEPHONE ENCOUNTER (OUTPATIENT)
Dept: CALL CENTER | Facility: HOSPITAL | Age: 76
End: 2021-04-14

## 2021-04-14 ENCOUNTER — TELEPHONE (OUTPATIENT)
Dept: PAIN MEDICINE | Facility: CLINIC | Age: 76
End: 2021-04-14

## 2021-04-14 ENCOUNTER — APPOINTMENT (OUTPATIENT)
Dept: PHYSICAL THERAPY | Facility: HOSPITAL | Age: 76
End: 2021-04-14

## 2021-04-14 NOTE — TELEPHONE ENCOUNTER
He did  the prescription you prescribed. He stated that the ER gave him 4 tabs when he was in the ER. Sandip scanned into chart

## 2021-04-14 NOTE — TELEPHONE ENCOUNTER
That is fine, we will take into account the days he was in the hospital with his refill dates. Thanks

## 2021-04-14 NOTE — TELEPHONE ENCOUNTER
Provider: DR. MCGRATH  Caller: PAPITO RAMIREZ  Relationship to Patient: SELF  Phone Number: [phone]  Reason for Call: PT CALLED TO ADVISE HE WAS SEEN IN THE ER FOR A STROKE FROM 04.09.21 - 04.12.21. HE WAS TOLD TO CALL IF THEY GAVE HIM PAIN MEDS AND BELIEVES THEY PRESCRIBED HIM NORCO 10MG. HE ADVISED THEY ALSO PRESCRIBED HIM ASPRIN, ELIQUIS, AND DOUBLED HIS CALCIUM MEDS FROM 10MG TO 20MG.

## 2021-04-14 NOTE — PROGRESS NOTES
Case Management Discharge Note      Final Note: Discharge to home with wife.         Selected Continued Care - Discharged on 4/13/2021 Admission date: 4/10/2021 - Discharge disposition: Home or Self Care    Destination    No services have been selected for the patient.              Durable Medical Equipment    No services have been selected for the patient.              Dialysis/Infusion    No services have been selected for the patient.              Home Medical Care    No services have been selected for the patient.              Therapy    No services have been selected for the patient.              Community Resources    No services have been selected for the patient.                  Transportation Services  Private: Car    Final Discharge Disposition Code: 01 - home or self-care

## 2021-04-14 NOTE — OUTREACH NOTE
Call Center TCM Note      Responses   [facility] patient discharged from?  [place]   Does the patient have one of the following disease processes/diagnoses(primary or secondary)?  Stroke (TIA)   TCM attempt successful?  Yes   Discharge diagnosis  CVA   Meds reviewed with patient/caregiver?  Yes   Is the patient having any side effects they believe may be caused by any medication additions or changes?  No   Does the patient have all medications ordered at discharge?  Yes   Is the patient taking all medications as directed (includes completed medication regime)?  Yes   Does the patient have a primary care provider?   Yes   Does the patient have an appointment with their PCP within 7 days of discharge?  Yes   Comments regarding PCP  TCM FWP with PCP Lázaro Cotto MD is sched for 04/19/2021.   Has the patient kept scheduled appointments due by today?  Yes   Has home health visited the patient within 72 hours of discharge?  N/A   Psychosocial issues?  No   Did the patient receive a copy of their discharge instructions?  Yes   Nursing interventions  Reviewed instructions with patient   What is the patient's perception of their health status since discharge?  Improving   Is the patient able to teach back FAST for Stroke?  Yes   Is the patient/caregiver able to teach back the risk factors for a stroke?  High blood pressure-goal below 120/80, Carotid or other artery disease, Illegal drug use, Sleep apnea, History of TIAs, Smoking, Diabetes, History of Afib, HIgh red blood cell count, High Cholesterol, Physical inactivity and obesity, Excessive alcohol intake   Is the patient/caregiver able to teach back signs and symptoms related to disease process for when to call PCP?  Yes   Is the patient/caregiver able to teach back signs and symptoms related to disease process for when to call 911?  Yes   If the patient is a current smoker, are they able to teach back resources for cessation?  Not a smoker   Is the  patient/caregiver able to teach back the hierarchy of who to call/visit for symptoms/problems? PCP, Specialist, Home health nurse, Urgent Care, ED, 911  Yes   TCM call completed?  Yes   Wrap up additional comments  Pt states he is managing well s/p acute CVA, with no residual effects. All new meds in place and pt has d/c Plavix. No questions at this time. TCM FWP with PCP Lázaro Cotto MD is sched for 04/19/2021. Pt also needs referral to [facility] but hospital did not provide any info.           Deedee Bernal MA    4/14/2021, 15:21 EDT

## 2021-04-16 ENCOUNTER — APPOINTMENT (OUTPATIENT)
Dept: PHYSICAL THERAPY | Facility: HOSPITAL | Age: 76
End: 2021-04-16

## 2021-04-19 ENCOUNTER — OFFICE VISIT (OUTPATIENT)
Dept: FAMILY MEDICINE CLINIC | Facility: CLINIC | Age: 76
End: 2021-04-19

## 2021-04-19 VITALS
RESPIRATION RATE: 16 BRPM | OXYGEN SATURATION: 97 % | HEART RATE: 58 BPM | HEIGHT: 68 IN | SYSTOLIC BLOOD PRESSURE: 142 MMHG | TEMPERATURE: 97.8 F | DIASTOLIC BLOOD PRESSURE: 69 MMHG | WEIGHT: 178 LBS | BODY MASS INDEX: 26.98 KG/M2

## 2021-04-19 DIAGNOSIS — I63.9 CEREBROVASCULAR ACCIDENT (CVA), UNSPECIFIED MECHANISM (HCC): ICD-10-CM

## 2021-04-19 DIAGNOSIS — I48.3 TYPICAL ATRIAL FLUTTER (HCC): ICD-10-CM

## 2021-04-19 DIAGNOSIS — I25.83 CORONARY ARTERY DISEASE DUE TO LIPID RICH PLAQUE: Primary | ICD-10-CM

## 2021-04-19 DIAGNOSIS — I25.10 CORONARY ARTERY DISEASE DUE TO LIPID RICH PLAQUE: Primary | ICD-10-CM

## 2021-04-19 PROCEDURE — 99496 TRANSJ CARE MGMT HIGH F2F 7D: CPT | Performed by: FAMILY MEDICINE

## 2021-04-19 NOTE — PROGRESS NOTES
"Transitional Care Follow Up Visit  Subjective     Madhu Santoro is a 75 y.o. male who presents for a transitional care management visit.    Within 48 business hours after discharge our office contacted him via telephone to coordinate his care and needs.      I reviewed and discussed the details of that call along with the discharge summary, hospital problems, inpatient lab results, inpatient diagnostic studies, and consultation reports with Madhu.     Current outpatient and discharge medications have been reconciled for the patient.  Reviewed by: Shelby Thompson      Date of TCM Phone Call 4/13/2021   [facility]   Date of Admission 4/10/2021   Date of Discharge 4/13/2021   Discharge Disposition Home or Self Care     Risk for Readmission (LACE) Score: 8 (4/13/2021  6:01 AM)      History of Present Illness   Course During Hospital Stay:  04/10/2021 - 04/13/2021  \"75-year-old male, presented to the hospital, he did have strokelike symptoms at the time of presentation.  Patient was diagnosed to have acute CVA with multiple infarcts in the distribution of left MCA concerning for embolic phenomena.  Patient was seen by cardiology and neurology service.  After neurology evaluation, patient was started on high intensity statin therapy.  Patient also was seen by cardiology, since embolic phenomena was suspected, patient was recommended to be started on systemic anticoagulation.  Patient will be on Eliquis.  We will also continue aspirin low-dose.  Patient will follow up closely with neurology and PCP on outpatient basis.  I have seen and examined patient at bedside, total time spent on discharge day management is more than 30 minutes.  Plan of care was discussed with him and he verbalized understanding.\"    Discussed his prehospitalization his hospitalization in detail with him he came in for an appointment today to go over his meds he feels better now he was admitted for 5 days he had a stroke.  He " was in the hospital for about a week is admitted for 5 days.  He feels better now it was an acute onset he was in [facility] in the grocery store putting groceries from the cart onto the  belt and this an acute onset of a stroke.  He went to the hospital he was admitted for 5 days he is off Plavix now is on Eliquis 5 mg twice daily his Crestor 1 from 10 mg to 20 mg daily again he is off Plavix he is on Eliquis 5 mg twice daily he said he feels okay now but he feels weak he is alert and oriented answers all my questions he has no headache at present he said it at [facility] 1 is happening he felt confused but he feels better now no headache now he answered all my questions in detail they also added aspirin 81 mg daily still on the [place] and that is from a pain doctor now is on Myrbetriq Dr. Oliveira is on Protonix for me Altase for me is 5 mg daily and his Crestor got increased to 20 mg daily his blood pressure was 142/69 we will check that again Crestor is for me so he gets Crestor 20 mg daily and Eliquis I think is probably from the heart doctor and that 5 mg twice a day now       The following portions of the patient's history were reviewed and updated as appropriate: allergies, current medications, past family history, past medical history, past social history, past surgical history and problem list.    Review of Systems   Constitutional: Positive for fatigue. Negative for fever.   HENT: Negative for sinus pain.    Eyes: Negative for visual disturbance.   Respiratory: Negative for chest tightness and shortness of breath.    Cardiovascular: Negative for chest pain.   Gastrointestinal: Negative for nausea and vomiting.   Endocrine: Negative for polyuria.   Genitourinary: Negative for urgency.   Musculoskeletal: Negative for back pain.   Skin: Negative for rash.   Neurological: Positive for weakness. Negative for dizziness and light-headedness.   Psychiatric/Behavioral: Negative for confusion. The patient is not  nervous/anxious.        Objective   Physical Exam  Vitals and nursing note reviewed.   Constitutional:       General: He is not in acute distress.     Appearance: He is well-developed. He is not ill-appearing, toxic-appearing or diaphoretic.   HENT:      Head: Normocephalic and atraumatic.      Mouth/Throat:      Pharynx: Oropharynx is clear.   Eyes:      Pupils: Pupils are equal, round, and reactive to light.   Neck:      Thyroid: No thyromegaly.   Cardiovascular:      Rate and Rhythm: Normal rate. Rhythm irregular.      Comments: A fib  Pulmonary:      Effort: Pulmonary effort is normal.      Breath sounds: Normal breath sounds. No rales.   Abdominal:      General: Bowel sounds are normal. There is no distension.      Palpations: Abdomen is soft.   Musculoskeletal:         General: No signs of injury. Normal range of motion.      Cervical back: Normal range of motion and neck supple.      Right lower leg: No edema.      Left lower leg: No edema.   Skin:     General: Skin is warm and dry.      Findings: No rash.   Neurological:      General: No focal deficit present.      Mental Status: He is alert and oriented to person, place, and time.   Psychiatric:         Mood and Affect: Mood normal.         Behavior: Behavior normal.         Assessment/Plan     Final diagnoses:   Coronary artery disease due to lipid rich plaque   Cerebrovascular accident (CVA), unspecified mechanism (CMS/HCC)   Typical atrial flutter (CMS/HCC)          Labs results discussed in detail with the patient, if no recent labs were done, order placed today.  Plan to update vaccines if needed today.  I  reviewed health maintenance with the patient as part of my preventative care plan. I discussed preventative counseling with the patient in detail.

## 2021-04-21 ENCOUNTER — APPOINTMENT (OUTPATIENT)
Dept: PHYSICAL THERAPY | Facility: HOSPITAL | Age: 76
End: 2021-04-21

## 2021-04-21 ENCOUNTER — READMISSION MANAGEMENT (OUTPATIENT)
Dept: CALL CENTER | Facility: HOSPITAL | Age: 76
End: 2021-04-21

## 2021-04-21 DIAGNOSIS — M25.551 CHRONIC PAIN OF BOTH HIPS: ICD-10-CM

## 2021-04-21 DIAGNOSIS — M54.50 CHRONIC BILATERAL LOW BACK PAIN WITHOUT SCIATICA: ICD-10-CM

## 2021-04-21 DIAGNOSIS — G89.29 CHRONIC PAIN OF BOTH HIPS: ICD-10-CM

## 2021-04-21 DIAGNOSIS — M25.552 CHRONIC PAIN OF BOTH HIPS: ICD-10-CM

## 2021-04-21 DIAGNOSIS — G89.29 CHRONIC BILATERAL LOW BACK PAIN WITHOUT SCIATICA: ICD-10-CM

## 2021-04-21 RX ORDER — HYDROCODONE BITARTRATE AND ACETAMINOPHEN 10; 325 MG/1; MG/1
1 TABLET ORAL 2 TIMES DAILY
Qty: 60 TABLET | Refills: 0 | Status: SHIPPED | OUTPATIENT
Start: 2021-04-21 | End: 2021-05-28 | Stop reason: SDUPTHER

## 2021-04-21 NOTE — OUTREACH NOTE
Stroke Week 2 Survey      Responses   [facility] patient discharged from?  [place]   Does the patient have one of the following disease processes/diagnoses(primary or secondary)?  Stroke (TIA)   Week 2 attempt successful?  Yes   Call start time  1501   Call end time  1506   Discharge diagnosis  CVA   Meds reviewed with patient/caregiver?  Yes   Is the patient having any side effects they believe may be caused by any medication additions or changes?  No   Does the patient have all medications ordered at discharge?  Yes   Is the patient taking all medications as directed (includes completed medication regime)?  Yes   Comments regarding appointments  PCP Dr. Damian Chen  appt.  04/19/2021.   Does the patient have a primary care provider?   Yes   Does the patient have an appointment with their PCP within 7 days of discharge?  Yes   Has the patient kept scheduled appointments due by today?  Yes   Has home health visited the patient within 72 hours of discharge?  N/A   Psychosocial issues?  No   Does the patient require any assistance with activities of daily living such as eating, bathing, dressing, walking, etc.?  No   Does the patient have any residual symptoms from stroke/TIA?  No   Does the patient understand the diet ordered at discharge?  Yes   Did the patient receive a copy of their discharge instructions?  Yes   Nursing interventions  Reviewed instructions with patient   What is the patient's perception of their health status since discharge?  Improving   Nursing interventions  Nurse provided patient education   Is the patient able to teach back FAST for Stroke?  Yes   Is the patient/caregiver able to teach back the risk factors for a stroke?  High blood pressure-goal below 120/80, Physical inactivity and obesity, History of TIAs, High Cholesterol, Carotid or other artery disease   Is the patient/caregiver able to teach back signs and symptoms related to disease process for when to call PCP?  Yes   Is  the patient/caregiver able to teach back signs and symptoms related to disease process for when to call 911?  Yes   If the patient is a current smoker, are they able to teach back resources for cessation?  Not a smoker   Is the patient/caregiver able to teach back the hierarchy of who to call/visit for symptoms/problems? PCP, Specialist, Home health nurse, Urgent Care, ED, 911  Yes   Week 2 call completed?  Yes   Wrap up additional comments  Pt. states he is getting his strength back. No questions or c/v at this time.          Alejandra He RN

## 2021-04-21 NOTE — TELEPHONE ENCOUNTER
Medication Refill Request    Date of phone call: 21    Medication being requested: norco 10/325mg si tab po bid   Qty: 60    Date of last visit: 3/31/21    Date of last refill:     RUTH up to date?:     Next Follow up?: 21    Any new pertinent information? (i.e, new medication allergies, new use of medications, change in patient's health or condition, non-compliance or inconsistency with prescribing agreement?): called and spoke with pt, he was told to call 10 days in advance for refill. He sts he still has meds left, he was just calling in advance. Told him in future to call 7 days in advance. Please advise. Thank you.

## 2021-04-23 ENCOUNTER — APPOINTMENT (OUTPATIENT)
Dept: PHYSICAL THERAPY | Facility: HOSPITAL | Age: 76
End: 2021-04-23

## 2021-04-28 ENCOUNTER — READMISSION MANAGEMENT (OUTPATIENT)
Dept: CALL CENTER | Facility: HOSPITAL | Age: 76
End: 2021-04-28

## 2021-04-28 ENCOUNTER — APPOINTMENT (OUTPATIENT)
Dept: PHYSICAL THERAPY | Facility: HOSPITAL | Age: 76
End: 2021-04-28

## 2021-04-28 NOTE — OUTREACH NOTE
Stroke Week 3 Survey      Responses   [facility] patient discharged from?  [place]   Does the patient have one of the following disease processes/diagnoses(primary or secondary)?  Stroke (TIA)   Week 3 attempt successful?  Yes   Call start time  1649   Call end time  1656   Discharge diagnosis  CVA   Meds reviewed with patient/caregiver?  Yes   Is the patient taking all medications as directed (includes completed medication regime)?  Yes   Does the patient have a primary care provider?   Yes   Has the patient kept scheduled appointments due by today?  Yes   Comments  Has appt with Dr. Loo tomorrow and will make another appt with Dr. Chen after that   Psychosocial issues?  No   Does the patient require any assistance with activities of daily living such as eating, bathing, dressing, walking, etc.?  No   Does the patient have any residual symptoms from stroke/TIA?  Yes   Residual symptoms comments  Sometimes feels a little off balance   Does the patient understand the diet ordered at discharge?  Yes   What is the patient's perception of their health status since discharge?  Improving   Is the patient/caregiver able to teach back signs and symptoms related to disease process for when to call PCP?  Yes   Is the patient/caregiver able to teach back signs and symptoms related to disease process for when to call 911?  Yes   Is the patient/caregiver able to teach back the hierarchy of who to call/visit for symptoms/problems? PCP, Specialist, Home health nurse, Urgent Care, ED, 911  Yes   Additional teach back comments  States he is doing well.  He states he cut his grass today.   Week 3 call completed?  Yes   Revoked  No further contact(revokes)-requires comment   Is the patient interested in additional calls from an ambulatory ?  NOTE:  applies to high risk patients requiring additional follow-up.  No   Graduated/Revoked comments  Denies questions or needs at this time          Sakina  Gera, XIOMARAN

## 2021-04-29 ENCOUNTER — OFFICE VISIT (OUTPATIENT)
Dept: CARDIOLOGY | Facility: CLINIC | Age: 76
End: 2021-04-29

## 2021-04-29 VITALS
BODY MASS INDEX: 26.52 KG/M2 | OXYGEN SATURATION: 99 % | HEIGHT: 68 IN | HEART RATE: 72 BPM | DIASTOLIC BLOOD PRESSURE: 76 MMHG | WEIGHT: 175 LBS | SYSTOLIC BLOOD PRESSURE: 148 MMHG

## 2021-04-29 DIAGNOSIS — Z86.79 HISTORY OF ATRIAL FLUTTER: ICD-10-CM

## 2021-04-29 DIAGNOSIS — I25.10 CHRONIC CORONARY ARTERY DISEASE: ICD-10-CM

## 2021-04-29 DIAGNOSIS — Z98.890 STATUS POST ABLATION OF ATRIAL FLUTTER: ICD-10-CM

## 2021-04-29 DIAGNOSIS — Z86.79 STATUS POST ABLATION OF ATRIAL FLUTTER: ICD-10-CM

## 2021-04-29 DIAGNOSIS — I63.411 CEREBROVASCULAR ACCIDENT (CVA) DUE TO EMBOLISM OF RIGHT MIDDLE CEREBRAL ARTERY (HCC): Primary | ICD-10-CM

## 2021-04-29 DIAGNOSIS — E78.5 HYPERLIPIDEMIA, UNSPECIFIED HYPERLIPIDEMIA TYPE: ICD-10-CM

## 2021-04-29 PROCEDURE — 99214 OFFICE O/P EST MOD 30 MIN: CPT | Performed by: NURSE PRACTITIONER

## 2021-04-29 NOTE — PROGRESS NOTES
"    CARDIOLOGY        Patient Name: Madhu Santoro  :1945  Age: 75 y.o.  Primary Cardiologist: Jann Wright MD  Encounter Provider:  MY Tapia    Date of Service: 21          CHIEF COMPLAINT / REASON FOR OFFICE VISIT     Coronary Artery Disease (BHE 2 wk f/u )      HISTORY OF PRESENT ILLNESS       HPI  Madhu Santoro is a 75 y.o. male who presents today for hospital reevaluation.     Pt has a  history significant for CAD with history of CABG, atrial flutter, hypertension, carotid artery disease, hyperlipidemia, history of stroke.    Review of medical records reveals patient was hospitalized with discharge date 2021.  He presented with strokelike symptoms.  He was diagnosed with an acute CVA with multiple infarcts in the distribution of the left MCA concerning for embolic phenomena.  Patient was started on high intensity statin therapy.  Patient was started on apixaban for systemic anticoagulation since strokes were thought to be embolic in nature.    Patient states that he is recovering well from his stroke.  He states that his balance is improving. He was able to complete yard work yesterday without any difficulties.  Patient is asymptomatic and denies any episodes of chest pain, shortness of breath, palpitations, lightheadedness, swelling or fatigue.  He is tolerating Eliquis and denies any hematuria, melana.     The following portions of the patient's history were reviewed and updated as appropriate: allergies, current medications, past family history, past medical history, past social history, past surgical history and problem list.      VITAL SIGNS     Visit Vitals  /76 (BP Location: Left arm, Patient Position: Sitting, Cuff Size: Adult)   Pulse 72   Ht 172.7 cm (68\")   Wt 79.4 kg (175 lb)   SpO2 99%   BMI 26.61 kg/m²         Wt Readings from Last 3 Encounters:   21 79.4 kg (175 lb)   21 80.7 kg (178 lb)   04/10/21 79.3 kg (174 lb 14.4 oz)     Body mass " index is 26.61 kg/m².      REVIEW OF SYSTEMS   Review of Systems   Constitutional: Negative for chills, fever, weight gain and weight loss.   Cardiovascular: Negative for leg swelling.   Respiratory: Negative for cough, snoring and wheezing.    Hematologic/Lymphatic: Negative for bleeding problem. Does not bruise/bleed easily.   Skin: Negative for color change.   Musculoskeletal: Negative for falls, joint pain and myalgias.   Gastrointestinal: Negative for melena.   Genitourinary: Negative for hematuria.   Neurological: Negative for excessive daytime sleepiness.   Psychiatric/Behavioral: Negative for depression. The patient is not nervous/anxious.            PHYSICAL EXAMINATION     Constitutional:       Appearance: Normal appearance. Well-developed.   Eyes:      Conjunctiva/sclera: Conjunctivae normal.   Neck:      Vascular: No carotid bruit.   Pulmonary:      Effort: Pulmonary effort is normal.      Breath sounds: Normal breath sounds.   Cardiovascular:      Normal rate. Regular rhythm. Normal S1. Normal S2.      Murmurs: There is no murmur.      No gallop. No click. No rub.   Edema:     Peripheral edema absent.   Musculoskeletal: Normal range of motion.      Comments: No pedal edema Skin:     General: Skin is warm and dry.   Neurological:      Mental Status: Alert and oriented to person, place, and time.      GCS: GCS eye subscore is 4. GCS verbal subscore is 5. GCS motor subscore is 6.   Psychiatric:         Speech: Speech normal.         Behavior: Behavior normal.         Thought Content: Thought content normal.         Judgment: Judgment normal.           REVIEWED DATA     Procedures    Cardiac Procedures:  1. Echocardiogram on 4/8/2017: The ejection fraction was 55-60%.  The left atrium was mildly to moderately dilated.  There was mild mitral regurgitation.  2. Left heart catheterization on 4/10/2017: There was a distal 80% and severely calcified stenosis in the left main extending into the ostium of left  circumflex.  The left circumflex was dominant, and did have the before mentioned 80% lesion in the ostial aspect extending from the left main.  The LAD had 20% proximal disease.  The right coronary artery was small, nondominant, and normal.  3. Status post 3 vessel coronary artery bypass grafting on 4/11/2017 by Dr. Cortez: ROSARIO to proximal LAD, SVG to OM 1, and SVG to OM 3.  4. Transesophageal echo on 4/17/2017: The ejection fraction was 55%.  There was moderate left atrial enlargement and mild to moderate right atrial enlargement.  The atrial septum was redundant, but no PFO was seen on the saline study.  There was no significant valvular disease.  5. Status post cavotricuspid isthmus ablation for typical atrial flutter on 4/18/2017 by Dr. Gustafson.  6. Carotid Doppler ultrasound on 4/10/2017: There was 50-59% stenosis in the right internal carotid artery.  There was 1-15% disease in the left internal carotid artery.  7. Echocardiogram on 10/24/2017: Ejection fraction was 56%.  There was grade 2 diastolic dysfunction.  There was aortic sclerosis without stenosis.  8. Echocardiogram 4/10/2021.  LVEF 57%.  Diastolic function normal.  RV cavity is mildly dilated.  Mild calcification of the mitral valve.  Trace mitral valve regurgitation.  No stenosis.  No aortic valve regurgitation or stenosis.    Lipid Panel    Lipid Panel 7/13/20 10/27/20 4/12/21   Total Cholesterol   106   Total Cholesterol 135 135    Triglycerides 64 73 68   HDL Cholesterol 51 47 37 (A)   VLDL Cholesterol 12.8 15 15   LDL Cholesterol  71 73 54   LDL/HDL Ratio   1.50   (A) Abnormal value                ASSESSMENT & PLAN      Diagnosis Plan   1. Cerebrovascular accident (CVA) due to embolism of right middle cerebral artery (CMS/HCC)     2. Chronic coronary artery disease     3. Hyperlipidemia, unspecified hyperlipidemia type     4. History of atrial flutter     5. Status post ablation of atrial flutter           SUMMARY/DISCUSSION  1. Subacute  CVA.  Patient with recent hospitalization for subacute CVA that was thought to be embolic in nature.  Patient does carry history of atrial flutter and has had prior ablation.  Was not anticoagulated at time of admission.  Patient was seen by neurology and cardiology services during hospitalization and both recommended systemic anticoagulation.  Patient has been prescribed apixaban 5 mg twice per day.  He is not experiencing any bleeding complications.  He is also on baby aspirin as well as high potency statin therapy.  2. Chronic coronary disease.  Patient with history of CABG.  Currently denies any angina, dyspnea, dyspnea with exertion.  Continue guideline directed medical therapy with aspirin, ramipril, statin therapy.  3. Hyperlipidemia.  LDL at goal 71 as documented above.  Continue rosuvastatin 20 mg/day.  4. History of a flutter with prior ablation.  Patient had not had any documented episodes of atrial fibrillation/atrial flutter since ablation he was not anticoagulated at time of admission.  Now anticoagulated with apixaban 5 mg twice per day.  5. Follow-up with Dr. Wright in 4 months for hospital reevaluation.  Please call the office sooner for problems or complications.        MEDICATIONS         Discharge Medications          Accurate as of April 29, 2021 10:59 AM. If you have any questions, ask your nurse or doctor.            Continue These Medications      Instructions Start Date   aluminum-magnesium hydroxide-simethicone 400-400-40 MG/5ML suspension  Commonly known as: MAALOX MAX   20 mL, Oral, Every 6 Hours PRN      Aspirin Low Dose 81 MG EC tablet  Generic drug: aspirin   81 mg, Oral, Daily      Eliquis 5 MG tablet tablet  Generic drug: apixaban   5 mg, Oral, 2 Times Daily      HYDROcodone-acetaminophen  MG per tablet  Commonly known as: NORCO   1 tablet, Oral, 2 times daily, Take at least 6 hours apart DNF 5/3/2021      Myrbetriq 25 MG tablet sustained-release 24 hour 24 hr  tablet  Generic drug: Mirabegron ER   25 mg, Oral, Daily      nitroglycerin 0.4 MG SL tablet  Commonly known as: NITROSTAT   0.4 mg, Sublingual, Every 5 Minutes PRN, Take no more than 3 doses in 15 minutes.       ramipril 5 MG capsule  Commonly known as: ALTACE   5 mg, Oral, Daily      rosuvastatin 20 MG tablet  Commonly known as: Crestor   20 mg, Oral, Daily         Stop These Medications    pantoprazole 40 MG EC tablet  Commonly known as: PROTONIX  Stopped by: MY Tapia                **Dragon Disclaimer:   Much of this encounter note is an electronic transcription/translation of spoken language to printed text. The electronic translation of spoken language may permit erroneous, or at times, nonsensical words or phrases to be inadvertently transcribed. Although I have reviewed the note for such errors, some may still exist.

## 2021-04-30 ENCOUNTER — APPOINTMENT (OUTPATIENT)
Dept: PHYSICAL THERAPY | Facility: HOSPITAL | Age: 76
End: 2021-04-30

## 2021-05-05 ENCOUNTER — APPOINTMENT (OUTPATIENT)
Dept: PHYSICAL THERAPY | Facility: HOSPITAL | Age: 76
End: 2021-05-05

## 2021-05-07 ENCOUNTER — APPOINTMENT (OUTPATIENT)
Dept: PHYSICAL THERAPY | Facility: HOSPITAL | Age: 76
End: 2021-05-07

## 2021-05-13 ENCOUNTER — OFFICE VISIT (OUTPATIENT)
Dept: FAMILY MEDICINE CLINIC | Facility: CLINIC | Age: 76
End: 2021-05-13

## 2021-05-13 ENCOUNTER — TELEPHONE (OUTPATIENT)
Dept: NEUROLOGY | Facility: CLINIC | Age: 76
End: 2021-05-13

## 2021-05-13 VITALS
BODY MASS INDEX: 25.91 KG/M2 | HEIGHT: 68 IN | TEMPERATURE: 98.1 F | SYSTOLIC BLOOD PRESSURE: 146 MMHG | HEART RATE: 50 BPM | WEIGHT: 171 LBS | DIASTOLIC BLOOD PRESSURE: 71 MMHG | RESPIRATION RATE: 14 BRPM

## 2021-05-13 DIAGNOSIS — Z00.00 MEDICARE ANNUAL WELLNESS VISIT, SUBSEQUENT: Primary | ICD-10-CM

## 2021-05-13 DIAGNOSIS — I10 BENIGN ESSENTIAL HYPERTENSION: ICD-10-CM

## 2021-05-13 DIAGNOSIS — Z12.5 SCREENING FOR PROSTATE CANCER: ICD-10-CM

## 2021-05-13 PROCEDURE — G0439 PPPS, SUBSEQ VISIT: HCPCS | Performed by: FAMILY MEDICINE

## 2021-05-13 RX ORDER — PANTOPRAZOLE SODIUM 40 MG/1
TABLET, DELAYED RELEASE ORAL
COMMUNITY
Start: 2021-05-11 | End: 2021-06-28 | Stop reason: SDUPTHER

## 2021-05-13 NOTE — PATIENT INSTRUCTIONS
Medicare Wellness  Personal Prevention Plan of Service     Date of Office Visit:  2021  Encounter Provider:  Damian Sanchez MD  Place of Service:  [facility]  Patient Name: Madhu Santoro  :  1945    As part of the Medicare Wellness portion of your visit today, we are providing you with this personalized preventive plan of services (PPPS). This plan is based upon recommendations of the [place] Preventive Services Task Force (USPSTF) and the Advisory Committee on Immunization Practices (ACIP).    This lists the preventive care services that should be considered, and provides dates of when you are due. Items listed as completed are up-to-date and do not require any further intervention.    Health Maintenance   Topic Date Due   • HEPATITIS C SCREENING  Never done   • Pneumococcal Vaccine 65+ (2 of 2 - PPSV23) 2016   • ZOSTER VACCINE (2 of 2) 2021 (Originally 2017)   • INFLUENZA VACCINE  2021   • LIPID PANEL  2022   • ANNUAL WELLNESS VISIT  2022   • COLORECTAL CANCER SCREENING  2029   • COVID-19 Vaccine  Completed   • TDAP/TD VACCINES  Discontinued       No orders of the defined types were placed in this encounter.      Return in about 6 months (around 2021) for Recheck.

## 2021-05-13 NOTE — TELEPHONE ENCOUNTER
Stroke Phone Call  Spoke with the patient  · Admission Date: 4/10/2021  · Discharge Date:  4/13/2021  · Discharge Destination: Home  · Meds reviewed with patient/caregiver?    [x]Yes [] No   o Antiplatelet:  ASA  - Understands purpose     [x]  Yes     []  No     - Understands how to take      [x]  Yes     []  No    o Cholesterol Reducing: Crestor  - Understands purpose     [x]  Yes     []  No    - Understands how to take      [x]  Yes     []  No   o Anticoagulant: Eliquis  - Understands purpose     [x]  Yes     []  No    - Understands how to take      [x]  Yes     []  No     · Is the patient taking all medication as directed?   [x]  Yes  []  No  · Discussed personal risk factors   [x]  Yes []  No    o Physical Inactivity  o Engaged in physical activity [x]  Yes []  No   - Taking walks, doing yardwork  o High blood pressure   - Reviewed medications ordered for high blood pressure  - Has been monitoring BP [x]  Yes     []  No  - BP goal <130/80  - States BP has been running around 140/70.   o High cholesterol   - Review desired LDL goal <70  o Atherosclerosis  - Plaque inside the arteries, “hardening of the arteries”  o Atrial fibrillation   • Discussed signs and symptoms of stroke and when patient to call 911?   [x]  Yes []  No  o Sudden weakness or numbness of the face, arm, or leg especially on one side of the body  o Sudden confusion, trouble speaking or understanding  o Sudden trouble seeing in one or both eyes   o Sudden trouble walking, dizziness, loss of balance or coordination  o Sudden severe headaches with no known cause    Notified Patient that if any of these symptoms occur to call 911  · Does the patient have any new signs or symptoms of a stroke?   []  Yes     [x]  No  • Does the patient have increasing stiffness in arms, hands, or legs?    []  Yes     [x]  No   Is this interfering with activities of daily living?   []  Yes     [x]  No  · Does the patient have an appointment with PCP?  [x]  Yes     []   "No  · Does the patient have 3 month Stroke Clinic appointment?  []  Yes     [x]  No Patient did not wish to schedule at this time.  States he is \"overloaded\" with appointments and will call.  · Is the patient currently in therapy, outpatient, or home health?  []  Yes     [x]  No     Needs a referral?       []  Yes     [x]  No    Patient Satisfaction   · How would you rate your satisfaction with the instructions provided about your specific risk factors for stroke?   []Poor  [] Fair    [] Good [] Very Good  [x] Excellent   · How would you rate your satisfaction with the instructions provided on the warnings signs and symptoms of stroke?   []Poor  [] Fair   [] Good [] Very Good  [x] Excellent   · How well did we explain the importance of calling 911 to activate the emergency medical system for new signs and symptoms of stroke?    []Poor  [] Fair   [] Good [] Very Good  [x] Excellent   · Would you recommend the stroke center to your friends and family?   []Definitely Would Not  [] Probably Would Not  [] Neutral   []  Probably Would [x] Definitely Would  • Did you understand who all of your providers were and what their roles were?      []  Yes     [x]  No        "

## 2021-05-13 NOTE — PROGRESS NOTES
The ABCs of the Annual Wellness Visit  Subsequent Medicare Wellness Visit    Chief Complaint   Patient presents with   • medicare wellness     due  - meds reviewed with pt today        Subjective   History of Present Illness:  Madhu Santoro is a 75 y.o. male who presents for a Subsequent Medicare Wellness Visit.    HEALTH RISK ASSESSMENT    Recent Hospitalizations:  Recently treated at the following:  [facility]    Current Medical Providers:  Patient Care Team:  Damian Sanchez MD as PCP - General (Family Medicine)  Jr Temo Cortez MD as Surgeon (Cardiothoracic Surgery)    Smoking Status:  Social History     Tobacco Use   Smoking Status Former Smoker   Smokeless Tobacco Never Used   Tobacco Comment    CAFFEINE USE       Alcohol Consumption:  Social History     Substance and Sexual Activity   Alcohol Use Yes    Comment: rarely       Depression Screen:   PHQ-2/PHQ-9 Depression Screening 5/13/2021   Little interest or pleasure in doing things 0   Feeling down, depressed, or hopeless 0   Trouble falling or staying asleep, or sleeping too much 0   Feeling tired or having little energy 0   Poor appetite or overeating 0   Feeling bad about yourself - or that you are a failure or have let yourself or your family down 0   Trouble concentrating on things, such as reading the newspaper or watching television 0   Moving or speaking so slowly that other people could have noticed. Or the opposite - being so fidgety or restless that you have been moving around a lot more than usual 0   Thoughts that you would be better off dead, or of hurting yourself in some way 0   Total Score 0   If you checked off any problems, how difficult have these problems made it for you to do your work, take care of things at home, or get along with other people? Not difficult at all       Fall Risk Screen:  STEADI Fall Risk Assessment was completed, and patient is at LOW risk for falls.Assessment completed on:5/13/2021    Health  Habits and Functional and Cognitive Screening:  Functional & Cognitive Status 5/13/2021   Do you have difficulty preparing food and eating? No   Do you have difficulty bathing yourself, getting dressed or grooming yourself? No   Do you have difficulty using the toilet? No   Do you have difficulty moving around from place to place? No   Do you have trouble with steps or getting out of a bed or a chair? No   Current Diet Well Balanced Diet   Dental Exam Up to date   Eye Exam Up to date   Exercise (times per week) 3 times per week   Current Exercises Include Walking   Do you need help using the phone?  No   Are you deaf or do you have serious difficulty hearing?  Yes   Do you need help with transportation? No   Do you need help shopping? No   Do you need help preparing meals?  No   Do you need help with housework?  No   Do you need help with laundry? No   Do you need help taking your medications? No   Do you need help managing money? No   Do you ever drive or ride in a car without wearing a seat belt? No   Have you felt unusual stress, anger or loneliness in the last month? No   Who do you live with? Spouse   If you need help, do you have trouble finding someone available to you? Yes   Have you been bothered in the last four weeks by sexual problems? No   Do you have difficulty concentrating, remembering or making decisions? No         Does the patient have evidence of cognitive impairment? No    Asprin use counseling:Taking ASA appropriately as indicated    Age-appropriate Screening Schedule:  Refer to the list below for future screening recommendations based on patient's age, sex and/or medical conditions. Orders for these recommended tests are listed in the plan section. The patient has been provided with a written plan.    Health Maintenance   Topic Date Due   • ZOSTER VACCINE (2 of 2) 11/12/2021 (Originally 11/20/2017)   • INFLUENZA VACCINE  08/01/2021   • LIPID PANEL  04/12/2022   • TDAP/TD VACCINES  Discontinued           The following portions of the patient's history were reviewed and updated as appropriate: allergies, current medications, past family history, past medical history, past social history, past surgical history and problem list.    Outpatient Medications Prior to Visit   Medication Sig Dispense Refill   • aluminum-magnesium hydroxide-simethicone (MAALOX MAX) 400-400-40 MG/5ML suspension Take 20 mL by mouth Every 6 (Six) Hours As Needed for Indigestion or Heartburn.     • apixaban (ELIQUIS) 5 MG tablet tablet Take 1 tablet by mouth 2 (Two) Times a Day. 180 tablet 3   • aspirin (aspirin) 81 MG EC tablet Take 1 tablet by mouth Daily. 30 tablet 0   • HYDROcodone-acetaminophen (NORCO)  MG per tablet Take 1 tablet by mouth 2 (two) times a day. Take at least 6 hours apart [facility] 5/3/2021 60 tablet 0   • Myrbetriq 25 MG tablet sustained-release 24 hour 24 hr tablet Take 25 mg by mouth Daily.     • nitroglycerin (NITROSTAT) 0.4 MG SL tablet Place 1 tablet under the tongue Every 5 (Five) Minutes As Needed for Chest Pain. Take no more than 3 doses in 15 minutes. 20 tablet 2   • pantoprazole (PROTONIX) 40 MG EC tablet      • ramipril (ALTACE) 5 MG capsule Take 1 capsule by mouth Daily. 90 capsule 1   • rosuvastatin (Crestor) 20 MG tablet Take 1 tablet by mouth Daily. 30 tablet 0     No facility-administered medications prior to visit.       Patient Active Problem List   Diagnosis   • Anxiety   • Arthritis   • Chronic coronary artery disease   • HLD (hyperlipidemia)   • Benign essential hypertension   • DDD (degenerative disc disease), lumbosacral   • Cerebrovascular accident (CMS/HCC)   • Encounter for screening for cardiovascular disorders   • Gastroesophageal reflux disease   • Hyperlipidemia   • Stenosis of carotid artery   • Former smoker   • History of cardiac catheterization   • S/P ablation of atrial flutter   • Typical atrial flutter (CMS/HCC)   • S/P CABG (coronary artery bypass graft)   • Adenomatous polyp of  "ascending colon   • Abdominal bloating   • Stroke (CMS/[facility])   • PAD (peripheral artery disease) (CMS/[facility])   • Acute cholecystitis   • Right upper quadrant abdominal pain   • Chronic pain of both hips   • Chronic bilateral low back pain without sciatica   • Sacroiliac joint dysfunction of both sides   • Encounter for long-term (current) use of high-risk medication   • Lumbar facet arthropathy   • Stroke-like symptoms   • CVA (cerebral vascular accident) (CMS/[facility])       Advanced Care Planning:  ACP discussion was held with the patient during this visit. Patient has an advance directive in EMR which is still valid.     Review of Systems    Compared to one year ago, the patient feels his physical health is the same.  Compared to one year ago, the patient feels his mental health is the same.    Reviewed chart for potential of high risk medication in the elderly: yes  Reviewed chart for potential of harmful drug interactions in the elderly:yes    Objective         Vitals:    05/13/21 0827   BP: 146/71   Pulse: 50   Resp: 14   Temp: 98.1 °F (36.7 °C)   TempSrc: Oral   Weight: 77.6 kg (171 lb)   Height: 172.7 cm (68\")   PainSc: 0-No pain       Body mass index is 26 kg/m².  Discussed the patient's BMI with him. The BMI is in the acceptable range.    Physical Exam    Lab Results   Component Value Date    TRIG 68 04/12/2021    HDL 37 (L) 04/12/2021    LDL 54 04/12/2021    VLDL 15 04/12/2021    HGBA1C 5.30 04/12/2021        Assessment/Plan   Medicare Risks and Personalized Health Plan  CMS Preventative Services Quick Reference  Cardiovascular risk    The above risks/problems have been discussed with the patient.  Pertinent information has been shared with the patient in the After Visit Summary.  Follow up plans and orders are seen below in the Assessment/Plan Section.    Diagnoses and all orders for this visit:    1. Medicare annual wellness visit, subsequent (Primary)    2. Benign essential hypertension  -     Comprehensive " metabolic panel; Future  -     Lipid panel; Future  -     CBC and Differential; Future  -     TSH; Future    3. Screening for prostate cancer  -     PSA; Future      Follow Up:  Return in about 6 months (around 11/13/2021) for Recheck.     An After Visit Summary and PPPS were given to the patient.

## 2021-05-17 RX ORDER — ROSUVASTATIN CALCIUM 10 MG/1
TABLET, COATED ORAL
Qty: 90 TABLET | Refills: 0 | OUTPATIENT
Start: 2021-05-17

## 2021-05-19 RX ORDER — ROSUVASTATIN CALCIUM 20 MG/1
20 TABLET, COATED ORAL DAILY
Qty: 30 TABLET | Refills: 5 | Status: SHIPPED | OUTPATIENT
Start: 2021-05-19 | End: 2021-08-17 | Stop reason: SDUPTHER

## 2021-05-28 ENCOUNTER — OFFICE VISIT (OUTPATIENT)
Dept: PAIN MEDICINE | Facility: CLINIC | Age: 76
End: 2021-05-28

## 2021-05-28 VITALS
RESPIRATION RATE: 18 BRPM | DIASTOLIC BLOOD PRESSURE: 78 MMHG | WEIGHT: 171 LBS | OXYGEN SATURATION: 98 % | HEART RATE: 60 BPM | SYSTOLIC BLOOD PRESSURE: 173 MMHG | BODY MASS INDEX: 25.91 KG/M2 | TEMPERATURE: 97.3 F | HEIGHT: 68 IN

## 2021-05-28 DIAGNOSIS — G89.29 CHRONIC PAIN OF BOTH HIPS: ICD-10-CM

## 2021-05-28 DIAGNOSIS — M25.551 CHRONIC PAIN OF BOTH HIPS: ICD-10-CM

## 2021-05-28 DIAGNOSIS — M47.816 LUMBAR FACET ARTHROPATHY: ICD-10-CM

## 2021-05-28 DIAGNOSIS — Z79.899 ENCOUNTER FOR LONG-TERM (CURRENT) USE OF HIGH-RISK MEDICATION: ICD-10-CM

## 2021-05-28 DIAGNOSIS — M54.50 CHRONIC BILATERAL LOW BACK PAIN WITHOUT SCIATICA: Primary | ICD-10-CM

## 2021-05-28 DIAGNOSIS — M25.552 CHRONIC PAIN OF BOTH HIPS: ICD-10-CM

## 2021-05-28 DIAGNOSIS — M53.3 SACROILIAC JOINT DYSFUNCTION OF BOTH SIDES: ICD-10-CM

## 2021-05-28 DIAGNOSIS — G89.29 CHRONIC BILATERAL LOW BACK PAIN WITHOUT SCIATICA: Primary | ICD-10-CM

## 2021-05-28 PROCEDURE — 99214 OFFICE O/P EST MOD 30 MIN: CPT | Performed by: NURSE PRACTITIONER

## 2021-05-28 RX ORDER — HYDROCODONE BITARTRATE AND ACETAMINOPHEN 10; 325 MG/1; MG/1
1 TABLET ORAL 2 TIMES DAILY
Qty: 60 TABLET | Refills: 0 | Status: SHIPPED | OUTPATIENT
Start: 2021-05-28 | End: 2021-07-06 | Stop reason: SDUPTHER

## 2021-05-28 NOTE — PROGRESS NOTES
CHIEF COMPLAINT  Back pain is unchanged since last visit    Subjective   Madhu Santoro is a 75 y.o. male  who presents for follow-up.  He has a history of back pain. Today his pain is 8/10VAS in severity. He continues with Norco 10/325 2/day. This medication regimen decreases his pain by a moderate amount. He states that without this medication he would not be able to get up out of his chair.     The only ongoing deficit from his recent stroke that he notices is some balance issues. He is not currently in PT. He is using the home exercises that he was taught by PT.     Patient remained masked during entire encounter. No cough present. I donned a mask and eye protection throughout entire visit. Prior to donning mask and eye protection, hand hygiene was performed, as well as when it was doffed.  I was closer than 6 feet, but not for an extended period of time. No obvious exposure to any bodily fluids.    Back Pain  This is a chronic problem. The current episode started more than 1 year ago. The pain is present in the lumbar spine (across his belt line). The quality of the pain is described as aching. Radiates to: whole leg aching pain bilaterally with standing and walking. The pain is at a severity of 8/10. The symptoms are aggravated by standing, sitting and position (walking). Stiffness is present all day. Associated symptoms include headaches, numbness and weakness. Pertinent negatives include no chest pain, dysuria or fever. He has tried analgesics, heat and home exercises (Norco 10/325) for the symptoms. The treatment provided moderate relief.      PEG Assessment   What number best describes your pain on average in the past week?4  What number best describes how, during the past week, pain has interfered with your enjoyment of life?5  What number best describes how, during the past week, pain has interfered with your general activity?  4    The following portions of the patient's history were reviewed and  "updated as appropriate: allergies, current medications, past family history, past medical history, past social history, past surgical history and problem list.    Review of Systems   Constitutional: Negative for chills and fever.   Respiratory: Negative for shortness of breath.    Cardiovascular: Negative for chest pain.   Gastrointestinal: Positive for constipation. Negative for diarrhea, nausea and vomiting.   Genitourinary: Negative for difficulty urinating, dysuria and enuresis.   Musculoskeletal: Positive for back pain.   Neurological: Positive for dizziness, weakness, light-headedness, numbness and headaches.   Psychiatric/Behavioral: Negative for confusion, hallucinations, self-injury, sleep disturbance and suicidal ideas. The patient is not nervous/anxious.    All other systems reviewed and are negative.    --  The aforementioned information the Chief Complaint section and above subjective data including any HPI data, and also the Review of Systems data, has been personally reviewed and affirmed.  --    Hospital course from 4/10/2021 to 4/13/2021 reviewed.  Patient presents to the hospital with strokelike symptoms and was diagnosed to have acute CVA with multiple infarcts in the distribution of the left MCA concerning for embolic phenomena.  Patient was started on high intensity statin therapy and on Eliquis as well as low-dose aspirin.  Plan close follow-up with neurology and PCP on outpatient basis.    Vitals:    05/28/21 1052   BP: 173/78   Pulse: 60   Resp: 18   Temp: 97.3 °F (36.3 °C)   SpO2: 98%   Weight: 77.6 kg (171 lb)   Height: 172.7 cm (68\")   PainSc:   8   PainLoc: Back     Objective   Physical Exam  Vitals and nursing note reviewed.   Constitutional:       Appearance: Normal appearance. He is well-developed.   Eyes:      General: Lids are normal.   Cardiovascular:      Rate and Rhythm: Normal rate.   Pulmonary:      Effort: Pulmonary effort is normal.   Musculoskeletal:      Cervical back: " Normal range of motion.      Lumbar back: Tenderness and bony tenderness present. Decreased range of motion.   Neurological:      Mental Status: He is alert and oriented to person, place, and time.      Gait: Gait abnormal.   Psychiatric:         Attention and Perception: Attention normal.         Mood and Affect: Mood normal.         Speech: Speech normal.         Behavior: Behavior normal.         Judgment: Judgment normal.       Assessment/Plan   Diagnoses and all orders for this visit:    1. Chronic bilateral low back pain without sciatica (Primary)  -     HYDROcodone-acetaminophen (NORCO)  MG per tablet; Take 1 tablet by mouth 2 (two) times a day. Take at least 6 hours apart. Max 2/day.  DNF 6/10/2021  Dispense: 60 tablet; Refill: 0    2. Encounter for long-term (current) use of high-risk medication    3. Chronic pain of both hips  -     HYDROcodone-acetaminophen (NORCO)  MG per tablet; Take 1 tablet by mouth 2 (two) times a day. Take at least 6 hours apart. Max 2/day.  DNF 6/10/2021  Dispense: 60 tablet; Refill: 0    4. Sacroiliac joint dysfunction of both sides    5. Lumbar facet arthropathy      --- CSA updated 3/3/2021  --- The urine drug screen confirmation from 3/3/45216 has been reviewed and the result is appropriate based on patient history and RUTH report  --- Refill [place] 10/325. DNF 6/10/2021 applied. Patient appears stable with current regimen. No adverse effects. Regarding continuation of opioids, there is no evidence of aberrant behavior or any red flags.  The patient continues with appropriate response to opioid therapy. ADL's remain intact by self.   --- Again voices no interest in procedures.   --- Follow-up 2 months or sooner if needed.      RUTH REPORT  As part of the patient's treatment plan, I am prescribing controlled substances. The patient has been made aware of appropriate use of such medications, including potential risk of somnolence, limited ability to drive and/or  work safely, and the potential for dependence or overdose. It has also bee made clear that these medications are for use by this patient only, without concomitant use of alcohol or other substances unless prescribed.     Patient has completed prescribing agreement detailing terms of continued prescribing of controlled substances, including monitoring RUTH reports, urine drug screening, and pill counts if necessary. The patient is aware that inappropriate use will results in cessation of prescribing such medications.    RUTH report has been reviewed and scanned into the patient's chart.    As the clinician, I personally reviewed the RUTH from 5/28/2021 while the patient was in the office today.    History and physical exam exhibit continued safe and appropriate use of controlled substances.    EMR Dragon/Transcription disclaimer:   Much of this encounter note is an electronic transcription/translation of spoken language to printed text. The electronic translation of spoken language may permit erroneous, or at times, nonsensical words or phrases to be inadvertently transcribed; Although I have reviewed the note for such errors, some may still exist.

## 2021-06-27 NOTE — PROGRESS NOTES
"Subjective   Mahdu Santoro is a 75 y.o. male.     CC: \"Stomach Issues\"    History of Present Illness     Pt comes in today reporting     Pt does have a GI appt on 7/13/21 scheduled.    The following portions of the patient's history were reviewed and updated as appropriate: allergies, current medications, past family history, past medical history, past social history, past surgical history and problem list.    Review of Systems   Constitutional: Negative for activity change, chills and fever.   Respiratory: Negative for cough.    Cardiovascular: Negative for chest pain.   Psychiatric/Behavioral: Negative for dysphoric mood.       There were no vitals taken for this visit.    Objective   Physical Exam  Constitutional:       General: He is not in acute distress.     Appearance: He is well-developed.   Cardiovascular:      Rate and Rhythm: Normal rate and regular rhythm.   Pulmonary:      Effort: Pulmonary effort is normal.      Breath sounds: Normal breath sounds.   Abdominal:      General: Abdomen is flat.      Palpations: Abdomen is soft.   Neurological:      Mental Status: He is alert and oriented to person, place, and time.   Psychiatric:         Behavior: Behavior normal.         Thought Content: Thought content normal.         Assessment/Plan   There are no diagnoses linked to this encounter.           "

## 2021-06-28 ENCOUNTER — OFFICE VISIT (OUTPATIENT)
Dept: FAMILY MEDICINE CLINIC | Facility: CLINIC | Age: 76
End: 2021-06-28

## 2021-06-28 VITALS
BODY MASS INDEX: 25.46 KG/M2 | OXYGEN SATURATION: 98 % | WEIGHT: 168 LBS | HEIGHT: 68 IN | SYSTOLIC BLOOD PRESSURE: 145 MMHG | RESPIRATION RATE: 16 BRPM | HEART RATE: 64 BPM | TEMPERATURE: 97.8 F | DIASTOLIC BLOOD PRESSURE: 64 MMHG

## 2021-06-28 DIAGNOSIS — K21.9 GASTROESOPHAGEAL REFLUX DISEASE WITHOUT ESOPHAGITIS: Primary | ICD-10-CM

## 2021-06-28 PROCEDURE — 99213 OFFICE O/P EST LOW 20 MIN: CPT | Performed by: FAMILY MEDICINE

## 2021-06-28 RX ORDER — PANTOPRAZOLE SODIUM 40 MG/1
40 TABLET, DELAYED RELEASE ORAL DAILY
Qty: 30 TABLET | Refills: 2 | Status: SHIPPED | OUTPATIENT
Start: 2021-06-28 | End: 2021-08-17 | Stop reason: SDUPTHER

## 2021-06-28 NOTE — PROGRESS NOTES
"Subjective   Madhu Santoro is a 75 y.o. male.     CC: Heartburn    History of Present Illness     Pt comes in today to discuss nocturnal GERD issues for the past several weeks. Pt reports this can awaken him from sleep and does respond to Mylanta. Pt reports he sleeps pretty flat.      The following portions of the patient's history were reviewed and updated as appropriate: allergies, current medications, past family history, past medical history, past social history, past surgical history and problem list.    Review of Systems   Constitutional: Negative for activity change, chills and fever.   Respiratory: Negative for cough.    Cardiovascular: Negative for chest pain.   Gastrointestinal: Positive for abdominal pain (epigastric region).        Dyspepsia   Psychiatric/Behavioral: Negative for dysphoric mood.       /64   Pulse 64   Temp 97.8 °F (36.6 °C) (Oral)   Resp 16   Ht 172.7 cm (68\")   Wt 76.2 kg (168 lb)   SpO2 98%   BMI 25.54 kg/m²     Objective   Physical Exam  Constitutional:       General: He is not in acute distress.     Appearance: He is well-developed.   Cardiovascular:      Rate and Rhythm: Normal rate and regular rhythm.   Pulmonary:      Effort: Pulmonary effort is normal.      Breath sounds: Normal breath sounds.   Abdominal:      General: Abdomen is flat.      Palpations: Abdomen is soft.      Tenderness: There is abdominal tenderness (epigastrium, mild).   Neurological:      Mental Status: He is alert and oriented to person, place, and time.   Psychiatric:         Behavior: Behavior normal.         Thought Content: Thought content normal.         Assessment/Plan   Diagnoses and all orders for this visit:    1. Gastroesophageal reflux disease without esophagitis (Primary)  -     pantoprazole (PROTONIX) 40 MG EC tablet; Take 1 tablet by mouth Daily.  Dispense: 30 tablet; Refill: 2    Elevation of HOB, no food w/i 2 hours bedtime, and keep appt in 2 weeks with GI discussed.           "

## 2021-07-06 ENCOUNTER — TELEPHONE (OUTPATIENT)
Dept: PAIN MEDICINE | Facility: CLINIC | Age: 76
End: 2021-07-06

## 2021-07-06 DIAGNOSIS — G89.29 CHRONIC PAIN OF BOTH HIPS: ICD-10-CM

## 2021-07-06 DIAGNOSIS — G89.29 CHRONIC BILATERAL LOW BACK PAIN WITHOUT SCIATICA: ICD-10-CM

## 2021-07-06 DIAGNOSIS — M25.551 CHRONIC PAIN OF BOTH HIPS: ICD-10-CM

## 2021-07-06 DIAGNOSIS — M25.552 CHRONIC PAIN OF BOTH HIPS: ICD-10-CM

## 2021-07-06 DIAGNOSIS — M54.50 CHRONIC BILATERAL LOW BACK PAIN WITHOUT SCIATICA: ICD-10-CM

## 2021-07-06 RX ORDER — HYDROCODONE BITARTRATE AND ACETAMINOPHEN 10; 325 MG/1; MG/1
1 TABLET ORAL 2 TIMES DAILY
Qty: 60 TABLET | Refills: 0 | Status: SHIPPED | OUTPATIENT
Start: 2021-07-06 | End: 2021-07-28 | Stop reason: SDUPTHER

## 2021-07-06 RX ORDER — HYDROCODONE BITARTRATE AND ACETAMINOPHEN 10; 325 MG/1; MG/1
1 TABLET ORAL 2 TIMES DAILY
Qty: 60 TABLET | Refills: 0 | OUTPATIENT
Start: 2021-07-06

## 2021-07-06 NOTE — TELEPHONE ENCOUNTER
Medication Refill Request    Date of phone call: 21    Medication being requested: Norco  mg  si tab po BID prn Max 2 day  Qty: 60    Date of last visit: 21    Date of last refill:     RUTH up to date?: no    Next Follow up?: 21    Any new pertinent information? (i.e, new medication allergies, new use of medications, change in patient's health or condition, non-compliance or inconsistency with prescribing agreement?):

## 2021-07-06 NOTE — TELEPHONE ENCOUNTER
Medication Refill Request    Date of phone call: 21    Medication being requested: norco 10/325mg si tab po bid  Qty: 60    Date of last visit: 21    Date of last refill:     RUTH up to date?:     Next Follow up?: 21    Any new pertinent information? (i.e, new medication allergies, new use of medications, change in patient's health or condition, non-compliance or inconsistency with prescribing agreement?):

## 2021-07-06 NOTE — TELEPHONE ENCOUNTER
Caller: MR RAMIREZ     Relationship: PATIENT     Best call back number: [phone]  HYDROcodone-acetaminophen (NORCO)  MG per tablet        Sig: Take 1 tablet by mouth 2 (two) times a day. Take at least 6 hours apart. Max 2/day            Medication needed:   Requested Prescriptions      No prescriptions requested or ordered in this encounter       When do you need the refill by: N/A     What additional details did the patient provide when requesting the medication: N/A     Does the patient have less than a 3 day supply:  [] Yes  [x] No    What is the patient's preferred pharmacy: [facility]

## 2021-07-13 ENCOUNTER — OFFICE VISIT (OUTPATIENT)
Dept: GASTROENTEROLOGY | Facility: CLINIC | Age: 76
End: 2021-07-13

## 2021-07-13 VITALS — TEMPERATURE: 98 F | WEIGHT: 165.8 LBS | BODY MASS INDEX: 25.13 KG/M2 | HEIGHT: 68 IN

## 2021-07-13 DIAGNOSIS — K21.9 GASTROESOPHAGEAL REFLUX DISEASE, UNSPECIFIED WHETHER ESOPHAGITIS PRESENT: ICD-10-CM

## 2021-07-13 DIAGNOSIS — Z86.010 PERSONAL HISTORY OF COLONIC POLYPS: ICD-10-CM

## 2021-07-13 DIAGNOSIS — R10.10 PAIN OF UPPER ABDOMEN: Primary | ICD-10-CM

## 2021-07-13 DIAGNOSIS — K86.2 PANCREATIC CYST: ICD-10-CM

## 2021-07-13 PROCEDURE — 99214 OFFICE O/P EST MOD 30 MIN: CPT | Performed by: NURSE PRACTITIONER

## 2021-07-13 RX ORDER — SUCRALFATE ORAL 1 G/10ML
1 SUSPENSION ORAL 2 TIMES DAILY
Qty: 420 ML | Refills: 1 | Status: SHIPPED | OUTPATIENT
Start: 2021-07-13 | End: 2022-08-24 | Stop reason: HOSPADM

## 2021-07-13 NOTE — PROGRESS NOTES
Chief Complaint   Patient presents with   • Abdominal Pain     HPI    Madhu Santoro is a  75 y.o. male here for a follow up visit for abdominal pain.    This patient follows with Dr. Abel, new to me.    Patient has a history of chronic epigastric pain.  Work-up has included normal EGD and colonoscopy (2019).  Mesenteric Dopplers have been negative (2019).  Prior CT scans have revealed a cystic lesion in the head of the pancreas.  EUS in 2019 with Dr. García was normal.  Recommendations were to repeat MRCP versus CT scan with pancreatic protocol in 1 year to reevaluate.    Colonoscopy reviewed from 2019 demonstrating 7 polyps, diverticulosis, nonbleeding internal hemorrhoids.  Repeat 1 year.    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    On visit today complains of abdominal pain just below the epigastric area.  Pain slightly improved after seeing Dr. García in 2019 but slowly returned over the last several months.  He is taking Protonix 40 mg once daily.  He takes Bentyl as needed.  He takes Pepcid as needed as well.  He denies dysphagia, odynophagia, breakthrough dyspeptic symptoms, or poor appetite.  His weight is stable.    No diarrhea, constipation, or rectal bleeding.    He is on Eliquis and aspirin for history of atrial flutter.  Patient suffered a CVA in April of this year.    Recent hemogram and LFTs normal.    Past Medical History:   Diagnosis Date   • Anxiety    • Arthritis    • Atrial flutter (CMS/HCC)     Status post cavotricuspid isthmus ablation by Dr. Gustafson on 4/18/17   • Mandel esophagus    • Benign essential hypertension    • CAD (coronary artery disease)     3 vessel CABG 4/11/17 by Dr. Cortez: ROSARIO-prox LAD, SVG-OM1, SVG-OM3   • Carotid artery disease (CMS/HCC)     Status post carotid endarterectomy - USG 4/10/17: 50-59% NICHELLE, 1-15% LICA.    • Colonic polyp    • DDD (degenerative disc disease), lumbosacral    • GERD (gastroesophageal reflux disease)    • H/O bone density study 2013   • H/O  complete eye exam 2014   • HLD (hyperlipidemia)    • Hypertension    • Lipid screening 05/31/2013   • Low back pain     [facility]   • Screening for prostate cancer 07/07/2015   • Stroke (CMS/HCC)        Past Surgical History:   Procedure Laterality Date   • CARDIAC CATHETERIZATION N/A 4/10/2017    Procedure: Left Heart Cath;  Surgeon: Marjorie Healy MD;  Location: [facility] CATH INVASIVE LOCATION;  Service:    • CARDIAC CATHETERIZATION N/A 4/10/2017    Procedure: Coronary angiography;  Surgeon: Marjorie Healy MD;  [facility];  Service:    • CARDIAC CATHETERIZATION N/A 4/10/2017    Procedure: Left ventriculography;  Surgeon: Marjorie Healy MD;  [facility];  Service:    • CARDIAC ELECTROPHYSIOLOGY PROCEDURE N/A 4/18/2017    Procedure: Ablation atrial flutter;  Surgeon: Jose Antonio Gustafson MD;  Location: [facility] CATH INVASIVE LOCATION;  Service:    • CHOLECYSTECTOMY     • CHOLECYSTECTOMY WITH INTRAOPERATIVE CHOLANGIOGRAM N/A 9/7/2019    Procedure: Laparoscopic cholecystectomy with intraoperative cholangiogram;  Surgeon: Gauri Travis MD;  Location: [facility] MAIN OR;  Service: General   • COLONOSCOPY  01/06/2015    Diverticulosis, one TA   • COLONOSCOPY N/A 2/14/2019    tics, NBIH, adenomatous polyp x 2   • CORONARY ARTERY BYPASS GRAFT N/A 4/11/2017    Procedure: AR STERNOTOMY CORONARY ARTERY BYPASS GRAFT TIMES 3 USING LEFT INTERNAL MAMMARY ARTERY AND LEFT GREATER SAPHENOUS VEIN GRAFT PER ENDOSCOPIC VEIN HARVESTING AND PRP ;  Surgeon: Temo Cortez MD;  Location: [facility] MAIN OR;  Service:    • ENDOSCOPY  01/06/2015    HH, Mandel's esophagus   • ENDOSCOPY N/A 2/14/2019    Z line irregular, HH, Mandel's esophagus   • VASECTOMY         Scheduled Meds:  Outpatient Encounter Medications as of 7/13/2021   Medication Sig Dispense Refill   • aluminum-magnesium hydroxide-simethicone (MAALOX MAX) 400-400-40 MG/5ML suspension Take 20 mL by  mouth Every 6 (Six) Hours As Needed for Indigestion or Heartburn.     • apixaban (ELIQUIS) 5 MG tablet tablet Take 1 tablet by mouth 2 (Two) Times a Day. 180 tablet 3   • aspirin (aspirin) 81 MG EC tablet Take 1 tablet by mouth Daily. 30 tablet 0   • HYDROcodone-acetaminophen (NORCO)  MG per tablet Take 1 tablet by mouth 2 (two) times a day. Take at least 6 hours apart. Max 2/day.  DNF 7/10/2021 60 tablet 0   • Myrbetriq 25 MG tablet sustained-release 24 hour 24 hr tablet Take 25 mg by mouth Daily.     • nitroglycerin (NITROSTAT) 0.4 MG SL tablet Place 1 tablet under the tongue Every 5 (Five) Minutes As Needed for Chest Pain. Take no more than 3 doses in 15 minutes. 20 tablet 2   • pantoprazole (PROTONIX) 40 MG EC tablet Take 1 tablet by mouth Daily. 30 tablet 2   • ramipril (ALTACE) 5 MG capsule Take 1 capsule by mouth Daily. 90 capsule 1   • rosuvastatin (Crestor) 20 MG tablet Take 1 tablet by mouth Daily. 30 tablet 5   • sucralfate (Carafate) 1 GM/10ML suspension Take 10 mL by mouth 2 (Two) Times a Day. 420 mL 1     No facility-administered encounter medications on file as of 7/13/2021.       Continuous Infusions:No current facility-administered medications for this visit.      PRN Meds:.    Allergies   Allergen Reactions   • Atorvastatin Myalgia     Myalgia   • Penicillins Hives, Swelling and Rash       Social History     Socioeconomic History   • Marital status:      Spouse name: Not on file   • Number of children: Not on file   • Years of education: Not on file   • Highest education level: Not on file   Tobacco Use   • Smoking status: Former Smoker   • Smokeless tobacco: Never Used   • Tobacco comment: CAFFEINE USE   Vaping Use   • Vaping Use: Never used   Substance and Sexual Activity   • Alcohol use: Yes     Comment: rarely   • Drug use: No   • Sexual activity: Yes     Partners: Female       Family History   Problem Relation Age of Onset   • Heart disease Mother    • Heart attack Mother    •  Stroke Mother    • Heart disease Father    • Heart attack Father    • Stroke Father    • Arthritis Other    • Diabetes Other    • Hypertension Other    • Kidney disease Other         stones   • Hypertension Sister    • Heart attack Brother    • Heart disease Brother    • No Known Problems Maternal Grandmother    • No Known Problems Maternal Grandfather    • No Known Problems Paternal Grandmother    • No Known Problems Paternal Grandfather    • No Known Problems Brother    • Heart disease Brother    • Heart attack Brother    • Diabetes Brother    • Pancreatic cancer Paternal Cousin        Review of Systems   Constitutional: Negative for activity change, appetite change, fatigue, fever and unexpected weight change.   HENT: Negative for trouble swallowing.    Respiratory: Negative for apnea, cough, choking, chest tightness, shortness of breath and wheezing.    Cardiovascular: Negative for chest pain, palpitations and leg swelling.   Gastrointestinal: Positive for abdominal pain. Negative for abdominal distention, anal bleeding, blood in stool, constipation, diarrhea, nausea, rectal pain and vomiting.       Vitals:    07/13/21 1132   Temp: 98 °F (36.7 °C)       Physical Exam  Constitutional:       Appearance: He is well-developed.   Abdominal:      General: Bowel sounds are normal. There is no distension.      Palpations: Abdomen is soft. There is no mass.      Tenderness: There is no abdominal tenderness. There is no guarding.      Hernia: No hernia is present.       Musculoskeletal:         General: Normal range of motion.   Skin:     General: Skin is warm and dry.      Capillary Refill: Capillary refill takes less than 2 seconds.   Neurological:      Mental Status: He is alert and oriented to person, place, and time.   Psychiatric:         Behavior: Behavior normal.       No radiology results for the last 7 days    Diagnoses and all orders for this visit:    1. Pain of upper abdomen (Primary)  -     Case Request;  Standing  -     Obtain Informed Consent; Standing  -     Verify Bowel Prep Was Successful; Standing  -     Give Tap Water Enema If Bowel Prep Insufficient; Standing  -     Case Request  -     MRI abdomen w wo contrast mrcp; Future    2. Gastroesophageal reflux disease, unspecified whether esophagitis present  -     Case Request; Standing  -     Obtain Informed Consent; Standing  -     Verify Bowel Prep Was Successful; Standing  -     Give Tap Water Enema If Bowel Prep Insufficient; Standing  -     Case Request    3. Personal history of colonic polyps  -     Case Request; Standing  -     Obtain Informed Consent; Standing  -     Verify Bowel Prep Was Successful; Standing  -     Give Tap Water Enema If Bowel Prep Insufficient; Standing  -     Case Request    4. Pancreatic cyst  -     MRI abdomen w wo contrast mrcp; Future    Other orders  -     sucralfate (Carafate) 1 GM/10ML suspension; Take 10 mL by mouth 2 (Two) Times a Day.  Dispense: 420 mL; Refill: 1    Assessment/plan    Madhu is seen today in follow-up with complaints of upper abdominal pain with a history of GERD, Mandel's esophagus, colon polyps, and pancreatic cyst.  Arrange bidirectional endoscopic evaluation with Dr. Abel for the above-mentioned symptoms and past medical history.    I discussed with the patient that I would prefer that anticoagulation be held for prior to the procedure, as there is otherwise increased risk of bleeding. However, I have indicated that I would defer the final decision to the patient’s prescribing provider as to whether or not it is safe to hold anticoagulation from a cardiac standpoint.    Continue PPI therapy.  Continue H2 blocker therapy.  Start Carafate suspension p.o. twice daily.  Antireflux dietary precautions reviewed and reinforced.  Patient also needs a follow-up MRCP to reassess stability of pancreatic cyst.  Follow-up and further recommendations pending the aforementioned work-up.

## 2021-07-15 ENCOUNTER — TELEPHONE (OUTPATIENT)
Dept: GASTROENTEROLOGY | Facility: CLINIC | Age: 76
End: 2021-07-15

## 2021-07-15 NOTE — TELEPHONE ENCOUNTER
----- Message from MY Cano sent at 7/15/2021  3:41 PM EDT -----  Regarding: Needs scopes and imaging  This patient needs an EGD and a colonoscopy with Dr. Abel.  He is on Eliquis.  We need approval from his prescribing provider to hold medication prior to scopes.  He also needs to be set up for an MRCP which I have ordered.  Case request are in.  Please obtain Eliquis clearance and help him set up imaging and procedures.  ----- Message -----  From: Everton Abel MD  Sent: 7/13/2021   1:03 PM EDT  To: MY Cano    Agree EGD and MRCP thank you, does he need a colonoscopy ?  ----- Message -----  From: Yoselin Yap APRN  Sent: 7/13/2021  12:09 PM EDT  To: Everton Abel MD    Madhu has a history of chronic epigastric pain.  I saw him in the office today and is having the same symptoms which returned several months ago no relief with his PCP started him back on PPI therapy.  10 pound weight loss because he is cut back on the amount of food he is eating because his stomach hurts.  He has a history of pancreatic cystic lesion.  EUS with Dr. García in 2019 was normal.  He was supposed to have a follow-up MRCP or CT with pancreatic protocol 1 year later but this is yet to be done.  I am putting him on Carafate.  Would you like for me to get an updated MRCP?  Essentially normal EGD in 2019.  Should we look again?

## 2021-07-19 ENCOUNTER — TELEPHONE (OUTPATIENT)
Dept: GASTROENTEROLOGY | Facility: CLINIC | Age: 76
End: 2021-07-19

## 2021-07-19 NOTE — TELEPHONE ENCOUNTER
Karen,  hold the Eliquis for 24 hours before the scope and then tell the patient to bring the Eliquis to the scope and likely can take it right after in the recovery area   delma

## 2021-07-23 ENCOUNTER — TELEPHONE (OUTPATIENT)
Dept: GASTROENTEROLOGY | Facility: CLINIC | Age: 76
End: 2021-07-23

## 2021-07-23 PROBLEM — Z86.0100 PERSONAL HISTORY OF COLONIC POLYPS: Status: ACTIVE | Noted: 2021-07-23

## 2021-07-23 PROBLEM — Z86.010 PERSONAL HISTORY OF COLONIC POLYPS: Status: ACTIVE | Noted: 2021-07-23

## 2021-07-28 ENCOUNTER — OFFICE VISIT (OUTPATIENT)
Dept: PAIN MEDICINE | Facility: CLINIC | Age: 76
End: 2021-07-28

## 2021-07-28 VITALS
OXYGEN SATURATION: 99 % | SYSTOLIC BLOOD PRESSURE: 184 MMHG | TEMPERATURE: 97.5 F | HEART RATE: 50 BPM | DIASTOLIC BLOOD PRESSURE: 80 MMHG | WEIGHT: 168 LBS | HEIGHT: 68 IN | RESPIRATION RATE: 20 BRPM | BODY MASS INDEX: 25.46 KG/M2

## 2021-07-28 DIAGNOSIS — M25.552 CHRONIC PAIN OF BOTH HIPS: ICD-10-CM

## 2021-07-28 DIAGNOSIS — Z79.899 ENCOUNTER FOR LONG-TERM (CURRENT) USE OF HIGH-RISK MEDICATION: Primary | ICD-10-CM

## 2021-07-28 DIAGNOSIS — G89.29 CHRONIC BILATERAL LOW BACK PAIN WITHOUT SCIATICA: ICD-10-CM

## 2021-07-28 DIAGNOSIS — M54.50 CHRONIC BILATERAL LOW BACK PAIN WITHOUT SCIATICA: ICD-10-CM

## 2021-07-28 DIAGNOSIS — M47.816 LUMBAR FACET ARTHROPATHY: ICD-10-CM

## 2021-07-28 DIAGNOSIS — G89.29 CHRONIC PAIN OF BOTH HIPS: ICD-10-CM

## 2021-07-28 DIAGNOSIS — M25.551 CHRONIC PAIN OF BOTH HIPS: ICD-10-CM

## 2021-07-28 DIAGNOSIS — M53.3 SACROILIAC JOINT DYSFUNCTION OF BOTH SIDES: ICD-10-CM

## 2021-07-28 PROCEDURE — 99214 OFFICE O/P EST MOD 30 MIN: CPT | Performed by: NURSE PRACTITIONER

## 2021-07-28 RX ORDER — HYDROCODONE BITARTRATE AND ACETAMINOPHEN 10; 325 MG/1; MG/1
1 TABLET ORAL 2 TIMES DAILY
Qty: 60 TABLET | Refills: 0 | Status: SHIPPED | OUTPATIENT
Start: 2021-07-28 | End: 2021-09-08 | Stop reason: SDUPTHER

## 2021-07-28 NOTE — PROGRESS NOTES
CHIEF COMPLAINT  F/u back pain. Pt sts pain has worsened since last ov.     Subjective   Madhu Santoro is a 75 y.o. male  who presents for follow-up.  He has a history of back pain.  Today his pain is 7-8/10VAS in severity. He describes his pain as continuous aching pain. This pain will intermittently radiate into his bilateral thighs (this does not happen every day).  He also states that his toes stay numb.  He continues with Norco 10/325 2/day.  This medication regimen decreases his pain and allows him to remain more active and functional compared to without this medication. ADLs by self.     He remains uninterested in any interventions for his low back pain.     BP is elevated today.  Patient denies chest pain, headache, or vision changes.  He does monitor his BP at home.  Instructed to notify Dr. Sanchez if his BP remains elevated.     Patient remained masked during entire encounter. No cough present. I donned a mask and eye protection throughout entire visit. Prior to donning mask and eye protection, hand hygiene was performed, as well as when it was doffed.  I was closer than 6 feet, but not for an extended period of time. No obvious exposure to any bodily fluids.    Back Pain  This is a chronic problem. The current episode started more than 1 year ago. The pain is present in the lumbar spine (across his belt line). The quality of the pain is described as aching. Radiates to: whole leg aching pain bilaterally with standing and walking. The pain is at a severity of 7/10. The symptoms are aggravated by standing, sitting and position (walking). Stiffness is present all day. Pertinent negatives include no chest pain, dysuria, fever, headaches, numbness or weakness. He has tried analgesics, heat and home exercises (Norco 10/325) for the symptoms. The treatment provided moderate relief.      PEG Assessment   What number best describes your pain on average in the past week?7  What number best describes how, during  "the past week, pain has interfered with your enjoyment of life?7  What number best describes how, during the past week, pain has interfered with your general activity?  7    The following portions of the patient's history were reviewed and updated as appropriate: allergies, current medications, past family history, past medical history, past social history, past surgical history and problem list.    Review of Systems   Constitutional: Negative for activity change, fatigue and fever.   HENT: Negative for congestion.    Eyes: Negative for visual disturbance.   Respiratory: Negative for cough and shortness of breath.    Cardiovascular: Negative for chest pain.   Gastrointestinal: Negative for constipation and diarrhea.   Genitourinary: Negative for difficulty urinating and dysuria.   Musculoskeletal: Positive for back pain.   Neurological: Negative for dizziness, weakness, light-headedness, numbness and headaches.   Psychiatric/Behavioral: Negative for agitation, sleep disturbance and suicidal ideas. The patient is not nervous/anxious.      --  The aforementioned information the Chief Complaint section and above subjective data including any HPI data, and also the Review of Systems data, has been personally reviewed and affirmed.  --    Vitals:    07/28/21 1044 07/28/21 1048   BP: (!) 188/76  Comment: pt sts had bp med this am (!) 184/80   BP Location: Right arm    Pulse: 50    Resp: 20    Temp: 97.5 °F (36.4 °C)    SpO2: 99%    Weight: 76.2 kg (168 lb)    Height: 172.7 cm (68\")    PainSc:   7    PainLoc: Back      Objective   Physical Exam  Vitals and nursing note reviewed.   Constitutional:       Appearance: Normal appearance. He is well-developed.   Eyes:      General: Lids are normal.   Cardiovascular:      Rate and Rhythm: Normal rate.   Pulmonary:      Effort: Pulmonary effort is normal.   Musculoskeletal:      Cervical back: Normal range of motion.      Lumbar back: Tenderness and bony tenderness present. " Decreased range of motion.   Neurological:      Mental Status: He is alert and oriented to person, place, and time.   Psychiatric:         Attention and Perception: Attention normal.         Mood and Affect: Mood normal.         Speech: Speech normal.         Behavior: Behavior normal.         Judgment: Judgment normal.       Assessment/Plan   Diagnoses and all orders for this visit:    1. Encounter for long-term (current) use of high-risk medication (Primary)    2. Sacroiliac joint dysfunction of both sides    3. Chronic bilateral low back pain without sciatica  -     HYDROcodone-acetaminophen (NORCO)  MG per tablet; Take 1 tablet by mouth 2 (two) times a day. Take at least 6 hours apart. Max 2/day.  DNF 8/11/2021  Dispense: 60 tablet; Refill: 0    4. Lumbar facet arthropathy    5. Chronic pain of both hips  -     HYDROcodone-acetaminophen (NORCO)  MG per tablet; Take 1 tablet by mouth 2 (two) times a day. Take at least 6 hours apart. Max 2/day.  DNF 8/11/2021  Dispense: 60 tablet; Refill: 0      --- The urine drug screen confirmation from 3/3/2021 has been reviewed and the result is appropriate based on patient history and RUTH report  --- CSA updated 3/3/2021  --- Refill [place] 10/325. DNF 8/11/2021 applied. Patient appears stable with current regimen. No adverse effects. Regarding continuation of opioids, there is no evidence of aberrant behavior or any red flags.  The patient continues with appropriate response to opioid therapy. ADL's remain intact by self.   --- Remains uninterested in any procedures/interventions for his back pain.  Discussed that if his back or leg pain continues to worsen we would need to consider getting an MRI of his low back for evaluation. Patient states understanding.   --- Follow-up 2 months or sooner if needed.      RUTH REPORT  As part of the patient's treatment plan, I am prescribing controlled substances. The patient has been made aware of appropriate use of such  medications, including potential risk of somnolence, limited ability to drive and/or work safely, and the potential for dependence or overdose. It has also bee made clear that these medications are for use by this patient only, without concomitant use of alcohol or other substances unless prescribed.     Patient has completed prescribing agreement detailing terms of continued prescribing of controlled substances, including monitoring RUTH reports, urine drug screening, and pill counts if necessary. The patient is aware that inappropriate use will results in cessation of prescribing such medications.    As the clinician, I personally reviewed the RUTH from 7/28/2021 while the patient was in the office today.    History and physical exam exhibit continued safe and appropriate use of controlled substances.     Dictated utilizing Dragon dictation.

## 2021-08-09 ENCOUNTER — HOSPITAL ENCOUNTER (OUTPATIENT)
Dept: MRI IMAGING | Facility: HOSPITAL | Age: 76
Discharge: HOME | End: 2021-08-09
Admitting: NURSE PRACTITIONER

## 2021-08-09 DIAGNOSIS — R10.10 PAIN OF UPPER ABDOMEN: ICD-10-CM

## 2021-08-09 DIAGNOSIS — K86.2 PANCREATIC CYST: ICD-10-CM

## 2021-08-09 PROCEDURE — A9577 INJ MULTIHANCE: HCPCS | Performed by: NURSE PRACTITIONER

## 2021-08-09 PROCEDURE — 0 GADOBENATE DIMEGLUMINE 529 MG/ML SOLUTION: Performed by: NURSE PRACTITIONER

## 2021-08-09 PROCEDURE — 82565 ASSAY OF CREATININE: CPT

## 2021-08-09 PROCEDURE — 74183 MRI ABD W/O CNTR FLWD CNTR: CPT

## 2021-08-09 RX ADMIN — GADOBENATE DIMEGLUMINE 15 ML: 529 INJECTION, SOLUTION INTRAVENOUS at 21:13

## 2021-08-12 LAB — CREAT BLDA-MCNC: 1 MG/DL (ref 0.6–1.3)

## 2021-08-12 NOTE — PROGRESS NOTES
MRCP completed to assess stability of pancreatic cyst.  Would you mind taking a look and giving me your thoughts.  They are recommending reimaging every 2 years but would you prefer 6 months?

## 2021-08-16 NOTE — PROGRESS NOTES
Please inform the patient that his MRI shows stable pancreatic cyst.  We would like to repeat MRCP in 1 year to reassess stability.  Please place imaging orders recall 1 year.    Dr. Abel -- Dr. Li says 1 year follow-up MRI is appropriate.

## 2021-08-17 NOTE — PROGRESS NOTES
Subjective   Madhu Santoro is a 75 y.o. male.     History of Present Illness     Chief Complaint:   Chief Complaint   Patient presents with   • Hypertension     MED REFILL / LAB RESULTS    • Hyperlipidemia     [facility]  / meds reviewed with pt today    • Heartburn       Madhu Santoro 75 y.o. male who presents today for Medical Management of the below listed issues and medication refills.  he has a problem list of   Patient Active Problem List   Diagnosis   • Anxiety   • Arthritis   • Chronic coronary artery disease   • HLD (hyperlipidemia)   • Benign essential hypertension   • DDD (degenerative disc disease), lumbosacral   • Cerebrovascular accident (CMS/HCC)   • Encounter for screening for cardiovascular disorders   • Gastroesophageal reflux disease   • Hyperlipidemia   • Stenosis of carotid artery   • Former smoker   • History of cardiac catheterization   • S/P ablation of atrial flutter   • Typical atrial flutter (CMS/HCC)   • S/P CABG (coronary artery bypass graft)   • Adenomatous polyp of ascending colon   • Abdominal bloating   • Stroke (CMS/HCC)   • PAD (peripheral artery disease) (CMS/HCC)   • Acute cholecystitis   • Right upper quadrant abdominal pain   • Chronic pain of both hips   • Chronic bilateral low back pain without sciatica   • Sacroiliac joint dysfunction of both sides   • Encounter for long-term (current) use of high-risk medication   • Lumbar facet arthropathy   • Stroke-like symptoms   • CVA (cerebral vascular accident) (CMS/HCC)   • Personal history of colonic polyps   .  Since the last visit, he has overall felt well.  he has been compliant with   Current Outpatient Medications:   •  pantoprazole (PROTONIX) 40 MG EC tablet, Take 1 tablet by mouth Daily., Disp: 90 tablet, Rfl: 1  •  ramipril (ALTACE) 5 MG capsule, Take 1 capsule by mouth Daily., Disp: 90 capsule, Rfl: 1  •  rosuvastatin (Crestor) 20 MG tablet, Take 1 tablet by mouth Daily., Disp: 90 tablet, Rfl: 1  •   "aluminum-magnesium hydroxide-simethicone (MAALOX MAX) 400-400-40 MG/5ML suspension, Take 20 mL by mouth Every 6 (Six) Hours As Needed for Indigestion or Heartburn., Disp: , Rfl:   •  apixaban (ELIQUIS) 5 MG tablet tablet, Take 1 tablet by mouth 2 (Two) Times a Day., Disp: 180 tablet, Rfl: 3  •  aspirin (aspirin) 81 MG EC tablet, Take 1 tablet by mouth Daily., Disp: 30 tablet, Rfl: 0  •  HYDROcodone-acetaminophen (NORCO)  MG per tablet, Take 1 tablet by mouth 2 (two) times a day. Take at least 6 hours apart. Max 2/day.  DNF 8/11/2021, Disp: 60 tablet, Rfl: 0  •  Myrbetriq 25 MG tablet sustained-release 24 hour 24 hr tablet, Take 25 mg by mouth Daily., Disp: , Rfl:   •  nitroglycerin (NITROSTAT) 0.4 MG SL tablet, Place 1 tablet under the tongue Every 5 (Five) Minutes As Needed for Chest Pain. Take no more than 3 doses in 15 minutes., Disp: 20 tablet, Rfl: 2  •  sucralfate (Carafate) 1 GM/10ML suspension, Take 10 mL by mouth 2 (Two) Times a Day., Disp: 420 mL, Rfl: 1.  he denies medication side effects.    All of the other chronic condition(s) listed above are stable w/o issues.    /70   Pulse 54   Temp 97.8 °F (36.6 °C) (Oral)   Resp 14   Ht 172.7 cm (68\")   Wt 77.6 kg (171 lb)   BMI 26.00 kg/m²     Results for orders placed or performed during the hospital encounter of 08/09/21   POC Creatinine    Specimen: Blood   Result Value Ref Range    Creatinine 1.00 0.60 - 1.30 mg/dL           The following portions of the patient's history were reviewed and updated as appropriate: allergies, current medications, past family history, past medical history, past social history, past surgical history and problem list.    Review of Systems   Constitutional: Negative for activity change, chills and fever.   Respiratory: Negative for cough.    Cardiovascular: Negative for chest pain.   Psychiatric/Behavioral: Negative for dysphoric mood.       Objective   Physical Exam  Constitutional:       General: He is not in " acute distress.     Appearance: He is well-developed.   Cardiovascular:      Rate and Rhythm: Normal rate and regular rhythm.   Pulmonary:      Effort: Pulmonary effort is normal.      Breath sounds: Normal breath sounds.   Neurological:      Mental Status: He is alert and oriented to person, place, and time.   Psychiatric:         Behavior: Behavior normal.         Thought Content: Thought content normal.     Labs reviewed with pt today during visit. All questions answered.      Assessment/Plan   Diagnoses and all orders for this visit:    1. Coronary artery disease due to lipid rich plaque (Primary)  -     ramipril (ALTACE) 5 MG capsule; Take 1 capsule by mouth Daily.  Dispense: 90 capsule; Refill: 1  -     Lipoprotein NMR; Future  -     Basic Metabolic Panel; Future    2. Gastroesophageal reflux disease without esophagitis  -     pantoprazole (PROTONIX) 40 MG EC tablet; Take 1 tablet by mouth Daily.  Dispense: 90 tablet; Refill: 1    3. Hyperlipidemia, unspecified hyperlipidemia type  -     rosuvastatin (Crestor) 20 MG tablet; Take 1 tablet by mouth Daily.  Dispense: 90 tablet; Refill: 1  -     Lipoprotein NMR; Future

## 2021-08-18 ENCOUNTER — OFFICE VISIT (OUTPATIENT)
Dept: FAMILY MEDICINE CLINIC | Facility: CLINIC | Age: 76
End: 2021-08-18

## 2021-08-18 VITALS
HEART RATE: 54 BPM | BODY MASS INDEX: 25.91 KG/M2 | RESPIRATION RATE: 14 BRPM | SYSTOLIC BLOOD PRESSURE: 130 MMHG | HEIGHT: 68 IN | DIASTOLIC BLOOD PRESSURE: 70 MMHG | WEIGHT: 171 LBS | TEMPERATURE: 97.8 F

## 2021-08-18 DIAGNOSIS — K21.9 GASTROESOPHAGEAL REFLUX DISEASE WITHOUT ESOPHAGITIS: Chronic | ICD-10-CM

## 2021-08-18 DIAGNOSIS — E78.5 HYPERLIPIDEMIA, UNSPECIFIED HYPERLIPIDEMIA TYPE: Chronic | ICD-10-CM

## 2021-08-18 DIAGNOSIS — I25.83 CORONARY ARTERY DISEASE DUE TO LIPID RICH PLAQUE: Primary | Chronic | ICD-10-CM

## 2021-08-18 DIAGNOSIS — I25.10 CORONARY ARTERY DISEASE DUE TO LIPID RICH PLAQUE: Primary | Chronic | ICD-10-CM

## 2021-08-18 PROCEDURE — 99214 OFFICE O/P EST MOD 30 MIN: CPT | Performed by: FAMILY MEDICINE

## 2021-08-18 RX ORDER — ROSUVASTATIN CALCIUM 20 MG/1
20 TABLET, COATED ORAL DAILY
Qty: 90 TABLET | Refills: 1 | Status: SHIPPED | OUTPATIENT
Start: 2021-08-18 | End: 2022-02-12 | Stop reason: SDUPTHER

## 2021-08-18 RX ORDER — PANTOPRAZOLE SODIUM 40 MG/1
40 TABLET, DELAYED RELEASE ORAL DAILY
Qty: 90 TABLET | Refills: 1 | Status: SHIPPED | OUTPATIENT
Start: 2021-08-18 | End: 2022-02-12 | Stop reason: SDUPTHER

## 2021-08-18 RX ORDER — RAMIPRIL 5 MG/1
5 CAPSULE ORAL DAILY
Qty: 90 CAPSULE | Refills: 1 | Status: SHIPPED | OUTPATIENT
Start: 2021-08-18 | End: 2022-02-12 | Stop reason: SDUPTHER

## 2021-08-23 ENCOUNTER — OFFICE VISIT (OUTPATIENT)
Dept: CARDIOLOGY | Facility: CLINIC | Age: 76
End: 2021-08-23

## 2021-08-23 VITALS
DIASTOLIC BLOOD PRESSURE: 80 MMHG | HEART RATE: 53 BPM | HEIGHT: 68 IN | OXYGEN SATURATION: 100 % | WEIGHT: 172 LBS | BODY MASS INDEX: 26.07 KG/M2 | SYSTOLIC BLOOD PRESSURE: 166 MMHG

## 2021-08-23 DIAGNOSIS — I25.810 CORONARY ARTERY DISEASE INVOLVING CORONARY BYPASS GRAFT OF NATIVE HEART WITHOUT ANGINA PECTORIS: Primary | ICD-10-CM

## 2021-08-23 DIAGNOSIS — Z98.890 STATUS POST CATHETER ABLATION OF ATRIAL FLUTTER: ICD-10-CM

## 2021-08-23 DIAGNOSIS — I77.9 CAROTID ARTERY DISEASE, UNSPECIFIED LATERALITY, UNSPECIFIED TYPE (HCC): ICD-10-CM

## 2021-08-23 DIAGNOSIS — I63.412 CEREBROVASCULAR ACCIDENT (CVA) DUE TO EMBOLISM OF LEFT MIDDLE CEREBRAL ARTERY (HCC): ICD-10-CM

## 2021-08-23 PROCEDURE — 99214 OFFICE O/P EST MOD 30 MIN: CPT | Performed by: INTERNAL MEDICINE

## 2021-08-23 RX ORDER — AMLODIPINE BESYLATE 2.5 MG/1
2.5 TABLET ORAL DAILY
Qty: 30 TABLET | Refills: 11 | Status: SHIPPED | OUTPATIENT
Start: 2021-08-23 | End: 2022-08-15

## 2021-08-23 NOTE — TELEPHONE ENCOUNTER
OV Today   Pt was given a copay card for  30 day free Eliquis Rx.   Melva states he needs a rx for fill Eliquis with co pay card.   Jaime JOLLY

## 2021-08-26 ENCOUNTER — TELEPHONE (OUTPATIENT)
Dept: GASTROENTEROLOGY | Facility: CLINIC | Age: 76
End: 2021-08-26

## 2021-08-26 DIAGNOSIS — K86.2 PANCREATIC CYST: Primary | ICD-10-CM

## 2021-08-26 NOTE — TELEPHONE ENCOUNTER
----- Message from MY Cano sent at 8/16/2021 12:47 PM EDT -----  Please inform the patient that his MRI shows stable pancreatic cyst.  We would like to repeat MRCP in 1 year to reassess stability.  Please place imaging orders recall 1 year.    Dr. Abel -- Dr. Li says 1 year follow-up MRI is appropriate.

## 2021-08-26 NOTE — TELEPHONE ENCOUNTER
Patient called. Advised as per Yoselin's note. He verb understanding.   F/u MRCP order placed with release date of 8/9/22.

## 2021-08-30 ENCOUNTER — TELEPHONE (OUTPATIENT)
Dept: CARDIOLOGY | Facility: CLINIC | Age: 76
End: 2021-08-30

## 2021-08-30 ENCOUNTER — TELEPHONE (OUTPATIENT)
Dept: FAMILY MEDICINE CLINIC | Facility: CLINIC | Age: 76
End: 2021-08-30

## 2021-08-30 NOTE — TELEPHONE ENCOUNTER
Pt called requesting eliquis samples. I called and informed patient these have been sat up front for him to .     LOT [id number]  EXP 8/2023

## 2021-08-30 NOTE — TELEPHONE ENCOUNTER
PATIENT CALLING TO SEE IF YOU HAVE ANY SAMPLES OF ELIQUIS FOR HIM.    PLEASE ADVISE  [phone]

## 2021-09-08 DIAGNOSIS — M25.552 CHRONIC PAIN OF BOTH HIPS: ICD-10-CM

## 2021-09-08 DIAGNOSIS — M54.50 CHRONIC BILATERAL LOW BACK PAIN WITHOUT SCIATICA: ICD-10-CM

## 2021-09-08 DIAGNOSIS — G89.29 CHRONIC PAIN OF BOTH HIPS: ICD-10-CM

## 2021-09-08 DIAGNOSIS — M25.551 CHRONIC PAIN OF BOTH HIPS: ICD-10-CM

## 2021-09-08 DIAGNOSIS — G89.29 CHRONIC BILATERAL LOW BACK PAIN WITHOUT SCIATICA: ICD-10-CM

## 2021-09-08 RX ORDER — HYDROCODONE BITARTRATE AND ACETAMINOPHEN 10; 325 MG/1; MG/1
1 TABLET ORAL 2 TIMES DAILY
Qty: 60 TABLET | Refills: 0 | Status: SHIPPED | OUTPATIENT
Start: 2021-09-08 | End: 2021-09-28 | Stop reason: SDUPTHER

## 2021-09-08 NOTE — TELEPHONE ENCOUNTER
Medication Refill Request    Date of phone call: 21    Medication being requested: norco 10/325mg si tab po bid. Take at least 6 hrs apart  Qty: 60    Date of last visit: 21    Date of last refill:     RUTH up to date?:     Next Follow up?: 21    Any new pertinent information? (i.e, new medication allergies, new use of medications, change in patient's health or condition, non-compliance or inconsistency with prescribing agreement?):

## 2021-09-19 NOTE — PROGRESS NOTES
Date of Office Visit:  2021  Encounter Provider: Eric Wright MD  Place of Service: [facility] CARDIOLOGY  Patient Name: Madhu Santoro  :1945    Chief complaint: Follow-up for coronary artery disease, atrial flutter status   post ablation, embolic CVA, and carotid artery disease.    History of Present Illness:      I again had the pleasure of seeing your patient in cardiology office on 2021.  As you   well know, he is a very pleasant 75 year-old white male with a past medical history   significant for coronary artery disease, prior carotid endarterectomy, and atrial flutter   status post ablation who presents for follow-up.  The patient formerly followed with Dr. Roger from the [facility].  He has a history of a carotid endarterectomy   in the past, and also has a history of a stroke for which he took Plavix for many years.       He presented to the [facility] emergency department on 2017 after   his primary care physician noted that he had a rapid heart rate of 150 bpm.  He was   found to be in typical atrial flutter at the time, which was a new diagnosis for him, although   he was completely asymptomatic with regards to the atrial flutter and was unaware that   he was in this.  He also had reported that he had experienced some chest pressure   radiating into his throat earlier in the week.  He was converted to sinus rhythm with an   amiodarone drip, and an echocardiogram on 2017 showed an ejection fraction of   55-60%, and no significant valvular disease.  He ultimately underwent a diagnostic left   heart catheterization on 4/10/2017 by Dr. Healy for presumed unstable angina and a   known history of coronary artery disease.  This showed severe coronary artery disease,   including a distal 80% severely calcified lesion in the left main extending into the ostium   of the left circumflex coronary artery.  He was then  referred for a coronary artery bypass   grafting operation, and underwent this by Dr. Cortez on 4/11/2017.  At that time, he had   a LIMA to LAD, SVG to OM1, and SVG to OM 3.  He did well postoperatively, but continued   to have significant issues with rapid atrial flutter which was largely unresponsive to   amiodarone at the time.  He ultimately underwent a cavotricuspid isthmus atrial flutter   ablation by Dr. Gustafson on 4/18/2017.  A preoperative AR performed the day prior to   this did not show any evidence for thrombus formation.  He did develop bradycardia on   even minimal doses of metoprolol as an outpatient, and this had to be discontinued.    The patient presented with confusion and speech abnormalities on 4/10/2021.  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    His carotid artery work-up was unrevealing.  It was felt that he had a high likelihood for   paroxysmal atrial fibrillation given his history of atrial flutter in the past.  He was already   on Plavix at the time.  He was subsequently placed on Eliquis, and his Plavix was   changed to aspirin.  He did have significant bruising issues while on dual antiplatelet   therapy in the past, and it was felt the Plavix and Eliquis would be higher risk than   aspirin and Eliquis.      The patient presents today for follow-up.  He still has issues with his balance and feels   like he staggers at times.  This has mainly been since his stroke in April 2021.  His   stamina has decreased as well, although he has made some slow improvements.  He   does have some bruising, although no significant bleeding.  His blood pressure has   been elevated, although not as high as it is in the office today.  He has not had any   chest pain.  He has had 2-3 episodes of palpitations where he feels like his heart is   racing at night in the 120s.    Past Medical History:   Diagnosis Date   • Anxiety    • Arthritis    •  Atrial flutter (CMS/HCC)     Status post cavotricuspid isthmus ablation by Dr. Gustafson on 4/18/17   • Mandel esophagus    • Benign essential hypertension    • CAD (coronary artery disease)     3 vessel CABG 4/11/17 by Dr. Cortez: ROSARIO-prox LAD, SVG-OM1, SVG-OM3   • Carotid artery disease (CMS/HCC)     Status post carotid endarterectomy - [facility] 4/10/17: 50-59% NICHELLE, 1-15% LICA.    • Colonic polyp    • DDD (degenerative disc disease), lumbosacral    • GERD (gastroesophageal reflux disease)    • H/O bone density study 2013   • H/O complete eye exam 2014   • HLD (hyperlipidemia)    • Hypertension    • Lipid screening 05/31/2013   • Low back pain     physical [facility]   • Screening for prostate cancer 07/07/2015   • Stroke (CMS/HCC)        Past Surgical History:   Procedure Laterality Date   • CARDIAC CATHETERIZATION N/A 4/10/2017    Procedure: Left Heart Cath;  Surgeon: Marjorie Healy MD;  Location: [facility] CATH INVASIVE LOCATION;  Service:    • CARDIAC CATHETERIZATION N/A 4/10/2017    Procedure: Coronary angiography;  Surgeon: Marjorie Healy MD;  Location: [facility] CATH INVASIVE LOCATION;  Service:    • CARDIAC CATHETERIZATION N/A 4/10/2017    Procedure: Left ventriculography;  Surgeon: Marjorie Healy MD;  Location: [facility] CATH INVASIVE LOCATION;  Service:    • CARDIAC ELECTROPHYSIOLOGY PROCEDURE N/A 4/18/2017    Procedure: Ablation atrial flutter;  Surgeon: Jose Antonio Gustafson MD;  Location: [facility] CATH INVASIVE LOCATION;  Service:    • CHOLECYSTECTOMY     • CHOLECYSTECTOMY WITH INTRAOPERATIVE CHOLANGIOGRAM N/A 9/7/2019    Procedure: Laparoscopic cholecystectomy with intraoperative cholangiogram;  Surgeon: Gauri Travis MD;  Location: [facility] MAIN OR;  Service: General   • COLONOSCOPY  01/06/2015    Diverticulosis, one TA   • COLONOSCOPY N/A 2/14/2019    tics, NBIH, adenomatous polyp x 2   • CORONARY ARTERY BYPASS GRAFT N/A 4/11/2017    Procedure: AR STERNOTOMY CORONARY ARTERY BYPASS GRAFT TIMES  3 USING LEFT INTERNAL MAMMARY ARTERY AND LEFT GREATER SAPHENOUS VEIN GRAFT PER ENDOSCOPIC VEIN HARVESTING AND PRP ;  Surgeon: Temo Cortez MD;  Location: [facility] OR;  Service:    • ENDOSCOPY  01/06/2015    HH, Mandel's esophagus   • ENDOSCOPY N/A 2/14/2019    Z line irregular, HH, Mandel's esophagus   • VASECTOMY         Current Outpatient Medications on File Prior to Visit   Medication Sig Dispense Refill   • aluminum-magnesium hydroxide-simethicone (MAALOX MAX) 400-400-40 MG/5ML suspension Take 20 mL by mouth Every 6 (Six) Hours As Needed for Indigestion or Heartburn.     • aspirin (aspirin) 81 MG EC tablet Take 1 tablet by mouth Daily. 30 tablet 0   • Myrbetriq 25 MG tablet sustained-release 24 hour 24 hr tablet Take 25 mg by mouth Daily.     • nitroglycerin (NITROSTAT) 0.4 MG SL tablet Place 1 tablet under the tongue Every 5 (Five) Minutes As Needed for Chest Pain. Take no more than 3 doses in 15 minutes. 20 tablet 2   • pantoprazole (PROTONIX) 40 MG EC tablet Take 1 tablet by mouth Daily. 90 tablet 1   • ramipril (ALTACE) 5 MG capsule Take 1 capsule by mouth Daily. 90 capsule 1   • rosuvastatin (Crestor) 20 MG tablet Take 1 tablet by mouth Daily. 90 tablet 1   • sucralfate (Carafate) 1 GM/10ML suspension Take 10 mL by mouth 2 (Two) Times a Day. 420 mL 1     No current facility-administered medications on file prior to visit.     Allergies as of 08/23/2021 - Reviewed 08/23/2021   Allergen Reaction Noted   • Atorvastatin Myalgia 05/23/2018   • Penicillins Hives, Swelling, and Rash 05/09/2013     Social History     Socioeconomic History   • Marital status:      Spouse name: Not on file   • Number of children: Not on file   • Years of education: Not on file   • Highest education level: Not on file   Tobacco Use   • Smoking status: Former Smoker   • Smokeless tobacco: Never Used   • Tobacco comment: CAFFEINE USE   Vaping Use   • Vaping Use: Never used   Substance and Sexual Activity   • Alcohol  "use: Yes     Comment: rarely   • Drug use: No   • Sexual activity: Yes     Partners: Female     Family History   Problem Relation Age of Onset   • Heart disease Mother    • Heart attack Mother    • Stroke Mother    • Heart disease Father    • Heart attack Father    • Stroke Father    • Arthritis Other    • Diabetes Other    • Hypertension Other    • Kidney disease Other         stones   • Hypertension Sister    • Heart attack Brother    • Heart disease Brother    • No Known Problems Maternal Grandmother    • No Known Problems Maternal Grandfather    • No Known Problems Paternal Grandmother    • No Known Problems Paternal Grandfather    • No Known Problems Brother    • Heart disease Brother    • Heart attack Brother    • Diabetes Brother    • Pancreatic cancer Paternal Cousin        Review of Systems   Cardiovascular: Positive for palpitations.   Musculoskeletal: Positive for joint pain.   Gastrointestinal: Positive for diarrhea.   Neurological: Positive for loss of balance.   All other systems reviewed and are negative.     Objective:     Vitals:    08/23/21 1250   BP: 166/80   BP Location: Left arm   Patient Position: Sitting   Cuff Size: Adult   Pulse: 53   SpO2: 100%   Weight: 78 kg (172 lb)   Height: 172.7 cm (68\")     Body mass index is 26.15 kg/m².    Physical Exam  Constitutional:       Appearance: He is well-developed.   HENT:      Head: Normocephalic and atraumatic.   Eyes:      Conjunctiva/sclera: Conjunctivae normal.   Cardiovascular:      Rate and Rhythm: Normal rate and regular rhythm.      Heart sounds: No murmur heard.   No friction rub. No gallop.    Pulmonary:      Effort: Pulmonary effort is normal.      Breath sounds: Normal breath sounds.   Abdominal:      Palpations: Abdomen is soft.      Tenderness: There is no abdominal tenderness.   Musculoskeletal:      Cervical back: Neck supple.   Skin:     General: Skin is warm.   Neurological:      Mental Status: He is alert and oriented to person, " place, and time.   Psychiatric:         Behavior: Behavior normal.       Lab Review:   Procedures    Cardiac Procedures:  1.  Echocardiogram on 4/8/2017: The ejection fraction was 55-60%.  The left atrium   was mildly to moderately dilated.  There was mild mitral regurgitation.  2.  Left heart catheterization on 4/10/2017: There was a distal 80% and severely   calcified stenosis in the left main extending into the ostium of left circumflex.  The left   circumflex was dominant, and did have the before mentioned 80% lesion in the ostial   aspect extending from the left main.  The LAD had 20% proximal disease.  The right   coronary artery was small, nondominant, and normal.  3.  Status post 3 vessel coronary artery bypass grafting on 4/11/2017 by Dr. Cortez:   LIMA to proximal LAD, SVG to OM 1, and SVG to OM 3.  4.  Transesophageal echo on 4/17/2017: The ejection fraction was 55%.  There was   moderate left atrial enlargement and mild to moderate right atrial enlargement.  The   atrial septum was redundant, but no PFO was seen on the saline study.  There was   no significant valvular disease.  5.  Status post cavotricuspid isthmus ablation for typical atrial flutter on 4/18/2017 by Dr. Gustafson.  6.  Carotid Doppler ultrasound on 4/10/2017: There was 50-59% stenosis in the right   internal carotid artery.  There was 1-15% disease in the left internal carotid artery.  7.  Echocardiogram on 10/24/2017: Ejection fraction was 56%.  There was grade 2   diastolic dysfunction.  There was aortic sclerosis without stenosis.  8.  Echocardiogram on 4/10/2021: The ejection fraction was 57%.  The right ventricle   was mildly enlarged.  There was trace mitral regurgitation.   9.  CT angiogram of the head and neck on 4/12/2021: Calcified plaque at both carotid   bifurcations without evidence of stenosis.    Assessment:       Diagnosis Plan   1. Coronary artery disease involving coronary bypass graft of native heart without angina  pectoris     2. Cerebrovascular accident (CVA) due to embolism of left middle cerebral artery (CMS/HCC)     3. Status post catheter ablation of atrial flutter     4. Carotid artery disease, unspecified laterality, unspecified type (CMS/HCC)       Plan:     Again, he did have an embolic CVA in April 2021.  I placed him on Eliquis at that time, and change his Plavix to aspirin 81 mg/day.  I did feel that he had a high likelihood of potentially having atrial fibrillation given that he has had an atrial flutter ablation in the past, along with his age and cardiac history.  He does have some bruising, although he has not had major bleeding issues.  He did have significant bruising with dual antiplatelet therapy in the past.    His blood pressure is elevated today, although it typically runs lower than this.  However, it is still has been high at times.  I am going to add amlodipine 2.5 mg/day to his regimen.  He is currently taking ramipril 5 mg/day.  He cannot take beta-blockers secondary to significant bradycardia even with minimal doses in the past.    He will continue on the Crestor 20 mg/day.  His lipid panel from 8/10/2021 showed a total cholesterol 125, HDL 42, LDL 68, and triglycerides 71.  He is followed by Dr. Mayorga in vascular surgery for his history of carotid endarterectomy.  He also had a less than 70% disease in his celiac artery.    I will plan on seeing him back in the office in 6 months unless other issues arise.    I spent 35 minutes in the care of the patient, including reviewing his recent hospital records extensively.  I also spent a significant amount of time discussing his recent stroke, anticoagulation, and hypertension management.

## 2021-09-28 ENCOUNTER — OFFICE VISIT (OUTPATIENT)
Dept: PAIN MEDICINE | Facility: CLINIC | Age: 76
End: 2021-09-28

## 2021-09-28 VITALS
DIASTOLIC BLOOD PRESSURE: 74 MMHG | BODY MASS INDEX: 25.91 KG/M2 | RESPIRATION RATE: 20 BRPM | HEIGHT: 68 IN | SYSTOLIC BLOOD PRESSURE: 150 MMHG | WEIGHT: 171 LBS | OXYGEN SATURATION: 96 % | HEART RATE: 57 BPM | TEMPERATURE: 97.1 F

## 2021-09-28 DIAGNOSIS — M25.50 ARTHRALGIA OF MULTIPLE JOINTS: ICD-10-CM

## 2021-09-28 DIAGNOSIS — G89.29 CHRONIC PAIN OF BOTH HIPS: ICD-10-CM

## 2021-09-28 DIAGNOSIS — M54.50 CHRONIC BILATERAL LOW BACK PAIN WITHOUT SCIATICA: Primary | ICD-10-CM

## 2021-09-28 DIAGNOSIS — M53.3 SACROILIAC JOINT DYSFUNCTION OF BOTH SIDES: ICD-10-CM

## 2021-09-28 DIAGNOSIS — Z79.899 ENCOUNTER FOR LONG-TERM (CURRENT) USE OF HIGH-RISK MEDICATION: ICD-10-CM

## 2021-09-28 DIAGNOSIS — M25.551 CHRONIC PAIN OF BOTH HIPS: ICD-10-CM

## 2021-09-28 DIAGNOSIS — M47.816 LUMBAR FACET ARTHROPATHY: ICD-10-CM

## 2021-09-28 DIAGNOSIS — M25.552 CHRONIC PAIN OF BOTH HIPS: ICD-10-CM

## 2021-09-28 DIAGNOSIS — G89.29 CHRONIC BILATERAL LOW BACK PAIN WITHOUT SCIATICA: Primary | ICD-10-CM

## 2021-09-28 PROCEDURE — 80305 DRUG TEST PRSMV DIR OPT OBS: CPT | Performed by: NURSE PRACTITIONER

## 2021-09-28 PROCEDURE — 99214 OFFICE O/P EST MOD 30 MIN: CPT | Performed by: NURSE PRACTITIONER

## 2021-09-28 RX ORDER — HYDROCODONE BITARTRATE AND ACETAMINOPHEN 10; 325 MG/1; MG/1
1 TABLET ORAL 2 TIMES DAILY
Qty: 60 TABLET | Refills: 0 | Status: SHIPPED | OUTPATIENT
Start: 2021-09-28 | End: 2021-11-04 | Stop reason: SDUPTHER

## 2021-09-28 NOTE — PROGRESS NOTES
CHIEF COMPLAINT  F/u back pain. Pt sts pain is the same.     Subjective   Madhu Santoro is a 75 y.o. male  who presents for follow-up.  He has a history of back pain and diffuse joint pain.  Today her pain is 7/10VAS in severity.  He continues with Norco 10/325 2/day. This medication regimen decreases his pain by 50%.  This medication does allow him to increase his activity level compared to if he was without this medication. ADLs by self. He reporst constipation which is managed by OTC stool softener and miralax. He denies any other side effects including somnolence.      Patient remained masked during entire encounter. No cough present. I donned a mask and eye protection throughout entire visit. Prior to donning mask and eye protection, hand hygiene was performed, as well as when it was doffed.  I was closer than 6 feet, but not for an extended period of time. No obvious exposure to any bodily fluids.    Back Pain  This is a chronic problem. The current episode started more than 1 year ago. The problem occurs constantly. The problem is unchanged. The pain is present in the lumbar spine (across his belt line). The quality of the pain is described as aching. Radiates to: whole leg aching pain bilaterally with standing and walking. The pain is at a severity of 7/10. The symptoms are aggravated by standing, sitting and position (walking). Stiffness is present all day. Pertinent negatives include no chest pain, dysuria, fever, headaches, numbness or weakness. He has tried analgesics, heat and home exercises (Norco 10/325) for the symptoms. The treatment provided moderate relief.      PEG Assessment   What number best describes your pain on average in the past week?7  What number best describes how, during the past week, pain has interfered with your enjoyment of life?7  What number best describes how, during the past week, pain has interfered with your general activity?  7    The following portions of the patient's  "history were reviewed and updated as appropriate: allergies, current medications, past family history, past medical history, past social history, past surgical history and problem list.    Review of Systems   Constitutional: Negative for activity change, fatigue and fever.   HENT: Negative for congestion.    Eyes: Negative for visual disturbance.   Respiratory: Negative for cough and shortness of breath.    Cardiovascular: Negative for chest pain.   Gastrointestinal: Negative for constipation and diarrhea.   Genitourinary: Negative for difficulty urinating and dysuria.   Musculoskeletal: Positive for back pain.   Neurological: Negative for dizziness, weakness, light-headedness, numbness and headaches.   Psychiatric/Behavioral: Negative for agitation, sleep disturbance and suicidal ideas. The patient is not nervous/anxious.      --  The aforementioned information the Chief Complaint section and above subjective data including any HPI data, and also the Review of Systems data, has been personally reviewed and affirmed.  --    Vitals:    09/28/21 1055   BP: 150/74  Comment: pt sts had bp meds today   Pulse: 57   Resp: 20   Temp: 97.1 °F (36.2 °C)   SpO2: 96%   Weight: 77.6 kg (171 lb)   Height: 172.7 cm (68\")   PainSc:   7   PainLoc: Back     Objective   Physical Exam  Vitals and nursing note reviewed.   Constitutional:       Appearance: Normal appearance. He is well-developed.   Eyes:      General: Lids are normal.   Cardiovascular:      Rate and Rhythm: Normal rate.   Pulmonary:      Effort: Pulmonary effort is normal.   Musculoskeletal:      Lumbar back: Tenderness and bony tenderness present. Decreased range of motion.      Comments: Diffuse arthritic changes   Neurological:      Mental Status: He is alert and oriented to person, place, and time.   Psychiatric:         Speech: Speech normal.         Behavior: Behavior normal.         Judgment: Judgment normal.       Assessment/Plan   Diagnoses and all orders for " this visit:    1. Chronic bilateral low back pain without sciatica (Primary)  -     HYDROcodone-acetaminophen (NORCO)  MG per tablet; Take 1 tablet by mouth 2 (two) times a day. Take at least 6 hours apart. Max 2/day.  DNF 10/10/2021  Dispense: 60 tablet; Refill: 0    2. Chronic pain of both hips  -     HYDROcodone-acetaminophen (NORCO)  MG per tablet; Take 1 tablet by mouth 2 (two) times a day. Take at least 6 hours apart. Max 2/day.  DNF 10/10/2021  Dispense: 60 tablet; Refill: 0    3. Encounter for long-term (current) use of high-risk medication    4. Sacroiliac joint dysfunction of both sides    5. Lumbar facet arthropathy      --- Routine UDS in office today as part of monitoring requirements for controlled substances.  The specimen was viewed and the immunoassay result reviewed and is +OPI.  This specimen will be sent to [facility] laboratory for confirmation.     --- The patient signed an updated copy of the controlled substance agreement on 3/3/2021  --- Refill Crystal 10/325. DNF 10/10/2021 applied. Patient appears stable with current regimen. No adverse effects. Regarding continuation of opioids, there is no evidence of aberrant behavior or any red flags.  The patient continues with appropriate response to opioid therapy. ADL's remain intact by self.   --- Follow-up 2 months or sooner if needed.      RUTH REPORT  As part of the patient's treatment plan, I am prescribing controlled substances. The patient has been made aware of appropriate use of such medications, including potential risk of somnolence, limited ability to drive and/or work safely, and the potential for dependence or overdose. It has also bee made clear that these medications are for use by this patient only, without concomitant use of alcohol or other substances unless prescribed.     Patient has completed prescribing agreement detailing terms of continued prescribing of controlled substances, including monitoring RUTH  reports, urine drug screening, and pill counts if necessary. The patient is aware that inappropriate use will results in cessation of prescribing such medications.    As the clinician, I personally reviewed the RUTH from 9/28/2021 while the patient was in the office today.    History and physical exam exhibit continued safe and appropriate use of controlled substances.     Dictated utilizing Dragon dictation.

## 2021-11-01 ENCOUNTER — TRANSCRIBE ORDERS (OUTPATIENT)
Dept: GASTROENTEROLOGY | Facility: CLINIC | Age: 76
End: 2021-11-01

## 2021-11-01 DIAGNOSIS — Z01.818 OTHER SPECIFIED PRE-OPERATIVE EXAMINATION: Primary | ICD-10-CM

## 2021-11-03 ENCOUNTER — LAB (OUTPATIENT)
Dept: LAB | Facility: HOSPITAL | Age: 76
End: 2021-11-03

## 2021-11-03 DIAGNOSIS — Z01.818 OTHER SPECIFIED PRE-OPERATIVE EXAMINATION: Primary | ICD-10-CM

## 2021-11-03 LAB — SARS-COV-2 ORF1AB RESP QL NAA+PROBE: NOT DETECTED

## 2021-11-03 PROCEDURE — U0005 INFEC AGEN DETEC AMPLI PROBE: HCPCS

## 2021-11-03 PROCEDURE — U0004 COV-19 TEST NON-CDC HGH THRU: HCPCS

## 2021-11-03 PROCEDURE — C9803 HOPD COVID-19 SPEC COLLECT: HCPCS

## 2021-11-04 DIAGNOSIS — M54.50 CHRONIC BILATERAL LOW BACK PAIN WITHOUT SCIATICA: ICD-10-CM

## 2021-11-04 DIAGNOSIS — M25.552 CHRONIC PAIN OF BOTH HIPS: ICD-10-CM

## 2021-11-04 DIAGNOSIS — G89.29 CHRONIC PAIN OF BOTH HIPS: ICD-10-CM

## 2021-11-04 DIAGNOSIS — G89.29 CHRONIC BILATERAL LOW BACK PAIN WITHOUT SCIATICA: ICD-10-CM

## 2021-11-04 DIAGNOSIS — M25.551 CHRONIC PAIN OF BOTH HIPS: ICD-10-CM

## 2021-11-04 RX ORDER — HYDROCODONE BITARTRATE AND ACETAMINOPHEN 10; 325 MG/1; MG/1
1 TABLET ORAL EVERY 6 HOURS PRN
Qty: 60 TABLET | Refills: 0 | Status: SHIPPED | OUTPATIENT
Start: 2021-11-04 | End: 2021-11-29 | Stop reason: SDUPTHER

## 2021-11-04 NOTE — TELEPHONE ENCOUNTER
Medication Refill Request    Date of phone call: 11/04/2021    Medication being requested: hydrocodone  mg sig: Take 1 tablet by mouth 2 (two) times a day. Take at least 6 hours apart. Max 2/day  Qty: 60    Date of last visit: 09/28/2021    Date of last refill:     RUTH up to date?:     Next Follow up?: 11/29/2021    Any new pertinent information? (i.e, new medication allergies, new use of medications, change in patient's health or condition, non-compliance or inconsistency with prescribing agreement?):

## 2021-11-05 ENCOUNTER — HOSPITAL ENCOUNTER (OUTPATIENT)
Facility: HOSPITAL | Age: 76
Setting detail: HOSPITAL OUTPATIENT SURGERY
Discharge: HOME | End: 2021-11-05
Attending: INTERNAL MEDICINE | Admitting: INTERNAL MEDICINE

## 2021-11-05 ENCOUNTER — ANESTHESIA (OUTPATIENT)
Dept: GASTROENTEROLOGY | Facility: HOSPITAL | Age: 76
End: 2021-11-05

## 2021-11-05 ENCOUNTER — ANESTHESIA EVENT (OUTPATIENT)
Dept: GASTROENTEROLOGY | Facility: HOSPITAL | Age: 76
End: 2021-11-05

## 2021-11-05 VITALS
OXYGEN SATURATION: 98 % | TEMPERATURE: 98 F | WEIGHT: 164.8 LBS | DIASTOLIC BLOOD PRESSURE: 60 MMHG | RESPIRATION RATE: 16 BRPM | BODY MASS INDEX: 24.98 KG/M2 | HEART RATE: 48 BPM | SYSTOLIC BLOOD PRESSURE: 121 MMHG | HEIGHT: 68 IN

## 2021-11-05 DIAGNOSIS — R10.10 PAIN OF UPPER ABDOMEN: ICD-10-CM

## 2021-11-05 DIAGNOSIS — K21.9 GASTROESOPHAGEAL REFLUX DISEASE, UNSPECIFIED WHETHER ESOPHAGITIS PRESENT: ICD-10-CM

## 2021-11-05 DIAGNOSIS — Z86.010 PERSONAL HISTORY OF COLONIC POLYPS: ICD-10-CM

## 2021-11-05 PROCEDURE — 43239 EGD BIOPSY SINGLE/MULTIPLE: CPT | Performed by: INTERNAL MEDICINE

## 2021-11-05 PROCEDURE — 45385 COLONOSCOPY W/LESION REMOVAL: CPT | Performed by: INTERNAL MEDICINE

## 2021-11-05 PROCEDURE — S0260 H&P FOR SURGERY: HCPCS | Performed by: INTERNAL MEDICINE

## 2021-11-05 PROCEDURE — 25010000002 PROPOFOL 10 MG/ML EMULSION: Performed by: ANESTHESIOLOGY

## 2021-11-05 PROCEDURE — 88305 TISSUE EXAM BY PATHOLOGIST: CPT | Performed by: INTERNAL MEDICINE

## 2021-11-05 RX ORDER — SODIUM CHLORIDE 0.9 % (FLUSH) 0.9 %
10 SYRINGE (ML) INJECTION AS NEEDED
Status: DISCONTINUED | OUTPATIENT
Start: 2021-11-05 | End: 2021-11-05 | Stop reason: HOSPADM

## 2021-11-05 RX ORDER — PROPOFOL 10 MG/ML
VIAL (ML) INTRAVENOUS AS NEEDED
Status: DISCONTINUED | OUTPATIENT
Start: 2021-11-05 | End: 2021-11-05 | Stop reason: SURG

## 2021-11-05 RX ORDER — PROPOFOL 10 MG/ML
VIAL (ML) INTRAVENOUS CONTINUOUS PRN
Status: DISCONTINUED | OUTPATIENT
Start: 2021-11-05 | End: 2021-11-05 | Stop reason: SURG

## 2021-11-05 RX ORDER — LIDOCAINE HYDROCHLORIDE 20 MG/ML
INJECTION, SOLUTION INFILTRATION; PERINEURAL AS NEEDED
Status: DISCONTINUED | OUTPATIENT
Start: 2021-11-05 | End: 2021-11-05 | Stop reason: SURG

## 2021-11-05 RX ORDER — SODIUM CHLORIDE, SODIUM LACTATE, POTASSIUM CHLORIDE, CALCIUM CHLORIDE 600; 310; 30; 20 MG/100ML; MG/100ML; MG/100ML; MG/100ML
1000 INJECTION, SOLUTION INTRAVENOUS CONTINUOUS
Status: DISCONTINUED | OUTPATIENT
Start: 2021-11-05 | End: 2021-11-05 | Stop reason: HOSPADM

## 2021-11-05 RX ADMIN — PROPOFOL 160 MCG/KG/MIN: 10 INJECTION, EMULSION INTRAVENOUS at 12:48

## 2021-11-05 RX ADMIN — LIDOCAINE HYDROCHLORIDE 60 MG: 20 INJECTION, SOLUTION INFILTRATION; PERINEURAL at 12:48

## 2021-11-05 RX ADMIN — SODIUM CHLORIDE, POTASSIUM CHLORIDE, SODIUM LACTATE AND CALCIUM CHLORIDE 1000 ML: 600; 310; 30; 20 INJECTION, SOLUTION INTRAVENOUS at 12:04

## 2021-11-05 RX ADMIN — Medication 60 MG: at 12:48

## 2021-11-05 NOTE — ANESTHESIA POSTPROCEDURE EVALUATION
"Patient: Madhu Santoro    Procedure Summary     Date: 11/05/21 Room / Location: [facility] ENDOSCOPY 7 /  [facility]    Anesthesia Start: 1243 Anesthesia Stop: 1332    Procedures:       ESOPHAGOGASTRODUODENOSCOPY WITH BIOPSIES (N/A Esophagus)      COLONOSCOPY TO CECUM AND TERM. ILEUM WITH COLD POLYPECTOMIES (N/A ) Diagnosis:       Pain of upper abdomen      Gastroesophageal reflux disease, unspecified whether esophagitis present      Personal history of colonic polyps      (Pain of upper abdomen [R10.10])      (Gastroesophageal reflux disease, unspecified whether esophagitis present [K21.9])      (Personal history of colonic polyps [Z86.010])    Surgeons: Everton Abel MD Provider: Kulwinder Emery MD    Anesthesia Type: MAC ASA Status: 3          Anesthesia Type: MAC    Vitals  Vitals Value Taken Time   /60 11/05/21 1352   Temp     Pulse 48 11/05/21 1352   Resp 16 11/05/21 1352   SpO2 98 % 11/05/21 1352           Post Anesthesia Care and Evaluation    Patient location during evaluation: bedside  Patient participation: complete - patient participated  Level of consciousness: awake and alert  Pain management: adequate  Airway patency: patent  Anesthetic complications: No anesthetic complications    Cardiovascular status: acceptable  Respiratory status: acceptable  Hydration status: acceptable    Comments: /60 (BP Location: Left arm, Patient Position: Lying)   Pulse (!) 48   Temp 36.7 °C (98 °F) (Oral)   Resp 16   Ht 172.7 cm (68\")   Wt 74.8 kg (164 lb 12.8 oz)   SpO2 98%   BMI 25.06 kg/m²           "

## 2021-11-05 NOTE — H&P
[facility]  Pre Procedure History & Physical    Chief Complaint:   Time for my colonoscopy and egd     Subjective     HPI:   75 y.o. male h/o polyps and barretts    Past Medical History:   Past Medical History:   Diagnosis Date   • Anxiety    • Arthritis    • Atrial flutter (HCC)     Status post cavotricuspid isthmus ablation by Dr. Gustafson on 4/18/17   • Mandel esophagus    • Benign essential hypertension    • CAD (coronary artery disease)     3 vessel CABG 4/11/17 by Dr. Cortez: ROSARIO-prox LAD, SVG-OM1, SVG-OM3   • Carotid artery disease (HCC)     Status post carotid endarterectomy - USG 4/10/17: 50-59% NICHELLE, 1-15% LICA.    • Colonic polyp    • DDD (degenerative disc disease), lumbosacral    • GERD (gastroesophageal reflux disease)    • H/O bone density study 2013   • H/O complete eye exam 2014   • HLD (hyperlipidemia)    • Hypertension    • Lipid screening 05/31/2013   • Low back pain     [facility]   • Screening for prostate cancer 07/07/2015   • Stroke (HCC)        Family History:  Family History   Problem Relation Age of Onset   • Heart disease Mother    • Heart attack Mother    • Stroke Mother    • Heart disease Father    • Heart attack Father    • Stroke Father    • Arthritis Other    • Diabetes Other    • Hypertension Other    • Kidney disease Other         stones   • Hypertension Sister    • Heart attack Brother    • Heart disease Brother    • No Known Problems Maternal Grandmother    • No Known Problems Maternal Grandfather    • No Known Problems Paternal Grandmother    • No Known Problems Paternal Grandfather    • No Known Problems Brother    • Heart disease Brother    • Heart attack Brother    • Diabetes Brother    • Pancreatic cancer Paternal Cousin        Social History:   reports that he quit smoking about 12 years ago. His smoking use included cigarettes. He started smoking about 62 years ago. He smoked 1.00 pack per day. He has never used  "smokeless tobacco. He reports current alcohol use. He reports that he does not use drugs.    Medications:   Medications Prior to Admission   Medication Sig Dispense Refill Last Dose   • amLODIPine (NORVASC) 2.5 MG tablet Take 1 tablet by mouth Daily. 30 tablet 11 11/5/2021 at Unknown time   • aspirin (aspirin) 81 MG EC tablet Take 1 tablet by mouth Daily. 30 tablet 0 11/5/2021 at Unknown time   • Myrbetriq 25 MG tablet sustained-release 24 hour 24 hr tablet Take 25 mg by mouth Daily.   11/5/2021 at Unknown time   • pantoprazole (PROTONIX) 40 MG EC tablet Take 1 tablet by mouth Daily. 90 tablet 1 11/5/2021 at Unknown time   • ramipril (ALTACE) 5 MG capsule Take 1 capsule by mouth Daily. 90 capsule 1 11/5/2021 at Unknown time   • rosuvastatin (Crestor) 20 MG tablet Take 1 tablet by mouth Daily. 90 tablet 1 11/5/2021 at Unknown time   • sucralfate (Carafate) 1 GM/10ML suspension Take 10 mL by mouth 2 (Two) Times a Day. 420 mL 1 11/4/2021 at Unknown time   • aluminum-magnesium hydroxide-simethicone (MAALOX MAX) 400-400-40 MG/5ML suspension Take 20 mL by mouth Every 6 (Six) Hours As Needed for Indigestion or Heartburn.   More than a month at Unknown time   • apixaban (ELIQUIS) 5 MG tablet tablet Take 1 tablet by mouth 2 (Two) Times a Day. 60 tablet 3 11/3/2021   • HYDROcodone-acetaminophen (NORCO)  MG per tablet Take 1 tablet by mouth Every 6 (Six) Hours As Needed for Moderate Pain . Take at least 6 hours apart. Max 2/day.  DNF 11/9/2021 60 tablet 0 11/4/2021   • nitroglycerin (NITROSTAT) 0.4 MG SL tablet Place 1 tablet under the tongue Every 5 (Five) Minutes As Needed for Chest Pain. Take no more than 3 doses in 15 minutes. 20 tablet 2 More than a month at Unknown time       Allergies:  Atorvastatin and Penicillins    ROS:    Pertinent items are noted in HPI     Objective     Blood pressure 139/82, pulse 67, temperature 98 °F (36.7 °C), temperature source Oral, resp. rate 19, height 172.7 cm (68\"), weight 74.8 kg " (164 lb 12.8 oz), SpO2 97 %.    Physical Exam   Constitutional: Pt is oriented to person, place, and time and well-developed, well-nourished, and in no distress.   Abdominal: Soft.   Psychiatric: Mood, memory, affect and judgment normal.     Assessment/Plan     Diagnosis:  Encounter for screening for colon cancer and egd for barretts    Anticipated Surgical Procedure:  Colonoscopy and egd     The risks, benefits, and alternatives of this procedure have been discussed with the patient or the responsible party- the patient understands and agrees to proceed.

## 2021-11-05 NOTE — DISCHARGE INSTRUCTIONS
For the next 24 hours patient needs to be with a responsible adult.    For 24 hours DO NOT drive, operate machinery, appliances, drink alcohol, make important decisions or sign legal documents.    Start with a light or bland diet and advance to regular diet as tolerated.    Follow recommendations on procedure report provided by your doctor.    Call Dr Abel for problems [phone]    Problems may include but not limited to: large amounts of bleeding, trouble breathing, repeated vomiting, severe unrelieved pain, fever or chills.

## 2021-11-05 NOTE — ANESTHESIA PREPROCEDURE EVALUATION
Anesthesia Evaluation     Patient summary reviewed and Nursing notes reviewed   no history of anesthetic complications:  NPO Solid Status: > 6 hours  NPO Liquid Status: > 6 hours           Airway   Mallampati: II  TM distance: >3 FB  Neck ROM: full  no difficulty expected and No difficulty expected  Dental - normal exam     Pulmonary - negative pulmonary ROS and normal exam    breath sounds clear to auscultation  (-) rhonchi, decreased breath sounds, wheezes, rales, stridor  Cardiovascular - normal exam    NYHA Classification: I  ECG reviewed  Rhythm: regular  Rate: normal    (+) hypertension 2 medications or greater, CAD, CABG >6 Months, dysrhythmias Atrial Flutter, PVD, hyperlipidemia,  carotid artery disease  (-) murmur, weak pulses, friction rub, systolic click, carotid bruits, JVD, peripheral edema    ROS comment: S/P Atrial Flutter Ablation    S/P Carotid Endarterectomy    Neuro/Psych  (+) CVA, psychiatric history Anxiety,     GI/Hepatic/Renal/Endo    (+)  GERD,      Musculoskeletal     (+) back pain,   Abdominal  - normal exam    Abdomen: soft.   Substance History - negative use     OB/GYN negative ob/gyn ROS         Other - negative ROS                       Anesthesia Plan    ASA 3     MAC     intravenous induction     Anesthetic plan, all risks, benefits, and alternatives have been provided, discussed and informed consent has been obtained with: patient.

## 2021-11-08 ENCOUNTER — TELEPHONE (OUTPATIENT)
Dept: GASTROENTEROLOGY | Facility: CLINIC | Age: 76
End: 2021-11-08

## 2021-11-08 LAB
LAB AP CASE REPORT: NORMAL
PATH REPORT.FINAL DX SPEC: NORMAL
PATH REPORT.GROSS SPEC: NORMAL

## 2021-11-08 NOTE — TELEPHONE ENCOUNTER
----- Message from Everton Abel MD sent at 11/8/2021 12:49 PM EST -----  Tubular adenoma colon polypsBarrett's esophagus without dysplasiaEGD and colonoscopy recall 3 yearsOffice visit 6 months

## 2021-11-08 NOTE — PROGRESS NOTES
Tubular adenoma colon polypsBarrett's esophagus without dysplasiaEGD and colonoscopy recall 3 yearsOffice visit 6 months

## 2021-11-08 NOTE — TELEPHONE ENCOUNTER
Called pt and advised of Dr. Abel's note.  Pt verbalized understanding.    EGD placed in recall for 11/5/2024.    Colonoscopy placed in recall for 11/05/2024.    Pt states he will trung back for 6 month f/u nanda.

## 2021-11-12 ENCOUNTER — TELEPHONE (OUTPATIENT)
Dept: FAMILY MEDICINE CLINIC | Facility: CLINIC | Age: 76
End: 2021-11-12

## 2021-11-12 NOTE — TELEPHONE ENCOUNTER
Caller: Madhu Santoro    Relationship to patient: Self    Best call back number: [phone]     Patient is needing: PATIENT IS CALLING TO REQUEST SAMPLES OF THE FOLLOWING MEDICATION.  PATIENT STATES HE TAKES 2 TABLETS PER DAY AND HE IS COMPLETELY OUT.     apixaban (ELIQUIS) 5 MG tablet tablet     PLEASE ADVISE.

## 2021-11-23 ENCOUNTER — OFFICE VISIT (OUTPATIENT)
Dept: CARDIOLOGY | Facility: CLINIC | Age: 76
End: 2021-11-23

## 2021-11-23 VITALS
BODY MASS INDEX: 25.91 KG/M2 | DIASTOLIC BLOOD PRESSURE: 82 MMHG | HEIGHT: 68 IN | HEART RATE: 61 BPM | OXYGEN SATURATION: 99 % | SYSTOLIC BLOOD PRESSURE: 124 MMHG | WEIGHT: 171 LBS

## 2021-11-23 DIAGNOSIS — E78.5 HYPERLIPIDEMIA, UNSPECIFIED HYPERLIPIDEMIA TYPE: ICD-10-CM

## 2021-11-23 DIAGNOSIS — I25.810 CORONARY ARTERY DISEASE INVOLVING CORONARY BYPASS GRAFT OF NATIVE HEART WITHOUT ANGINA PECTORIS: ICD-10-CM

## 2021-11-23 DIAGNOSIS — Z98.890 STATUS POST ABLATION OF ATRIAL FLUTTER: ICD-10-CM

## 2021-11-23 DIAGNOSIS — Z86.79 HISTORY OF ATRIAL FLUTTER: ICD-10-CM

## 2021-11-23 DIAGNOSIS — I63.412 CEREBROVASCULAR ACCIDENT (CVA) DUE TO EMBOLISM OF LEFT MIDDLE CEREBRAL ARTERY (HCC): Primary | ICD-10-CM

## 2021-11-23 DIAGNOSIS — Z86.79 STATUS POST ABLATION OF ATRIAL FLUTTER: ICD-10-CM

## 2021-11-23 PROCEDURE — 99214 OFFICE O/P EST MOD 30 MIN: CPT | Performed by: NURSE PRACTITIONER

## 2021-11-23 NOTE — PROGRESS NOTES
"    CARDIOLOGY        Patient Name: Madhu Santoro  :1945  Age: 75 y.o.  Primary Cardiologist: Jann Wright MD  Encounter Provider:  MY Tapia    Date of Service: 21          CHIEF COMPLAINT / REASON FOR OFFICE VISIT     Coronary Artery Disease (3 month f/u )      HISTORY OF PRESENT ILLNESS       Coronary Artery Disease  Pertinent negatives include no leg swelling or weight gain.     Madhu Santoro is a 75 y.o. male who presents today for hospital reevaluation.     Pt has a  history significant for CAD with history of CABG, atrial flutter, hypertension, carotid artery disease, hyperlipidemia, history of stroke.    Review of medical records reveals patient was hospitalized with discharge date 2021.  He presented with strokelike symptoms.  He was diagnosed with an acute CVA with multiple infarcts in the distribution of the left MCA concerning for embolic phenomena.  Patient was started on high intensity statin therapy.  Patient was started on apixaban for systemic anticoagulation since strokes were thought to be embolic in nature.    Patient states that he is recovering well from his stroke.  He states that his balance is improving. He was able to complete yard work yesterday without any difficulties.  Patient is asymptomatic and denies any episodes of chest pain, shortness of breath, palpitations, lightheadedness, swelling or fatigue.  He is tolerating Eliquis and denies any hematuria, melana.     The following portions of the patient's history were reviewed and updated as appropriate: allergies, current medications, past family history, past medical history, past social history, past surgical history and problem list.      VITAL SIGNS     Visit Vitals  /82 (BP Location: Left arm, Patient Position: Sitting, Cuff Size: Adult)   Pulse 61   Ht 172.7 cm (68\")   Wt 77.6 kg (171 lb)   SpO2 99%   BMI 26.00 kg/m²         Wt Readings from Last 3 Encounters:   21 77.6 kg (171 " lb)   11/05/21 74.8 kg (164 lb 12.8 oz)   09/28/21 77.6 kg (171 lb)     Body mass index is 26 kg/m².      REVIEW OF SYSTEMS   Review of Systems   Constitutional: Negative for chills, fever, weight gain and weight loss.   Cardiovascular: Negative for leg swelling.   Respiratory: Negative for cough, snoring and wheezing.    Hematologic/Lymphatic: Negative for bleeding problem. Does not bruise/bleed easily.   Skin: Negative for color change.   Musculoskeletal: Negative for falls, joint pain and myalgias.   Gastrointestinal: Negative for melena.   Genitourinary: Negative for hematuria.   Neurological: Negative for excessive daytime sleepiness.   Psychiatric/Behavioral: Negative for depression. The patient is not nervous/anxious.            PHYSICAL EXAMINATION     Constitutional:       Appearance: Normal appearance. Well-developed.   Eyes:      Conjunctiva/sclera: Conjunctivae normal.   Neck:      Vascular: No carotid bruit.   Pulmonary:      Effort: Pulmonary effort is normal.      Breath sounds: Normal breath sounds.   Cardiovascular:      Normal rate. Regular rhythm. Normal S1. Normal S2.      Murmurs: There is no murmur.      No gallop. No click. No rub.   Edema:     Peripheral edema absent.   Musculoskeletal: Normal range of motion.      Comments: No pedal edema Skin:     General: Skin is warm and dry.   Neurological:      Mental Status: Alert and oriented to person, place, and time.      GCS: GCS eye subscore is 4. GCS verbal subscore is 5. GCS motor subscore is 6.   Psychiatric:         Speech: Speech normal.         Behavior: Behavior normal.         Thought Content: Thought content normal.         Judgment: Judgment normal.           REVIEWED DATA     Procedures      Cardiac Procedures:  1. Echocardiogram on 4/8/2017: The ejection fraction was 55-60%.  The left atrium was mildly to moderately dilated.  There was mild mitral regurgitation.  2. Left heart catheterization on 4/10/2017: There was a distal 80% and  severely calcified stenosis in the left main extending into the ostium of left circumflex.  The left circumflex was dominant, and did have the before mentioned 80% lesion in the ostial aspect extending from the left main.  The LAD had 20% proximal disease.  The right coronary artery was small, nondominant, and normal.  3. Status post 3 vessel coronary artery bypass grafting on 4/11/2017 by Dr. Cortez: ROSARIO to proximal LAD, SVG to OM 1, and SVG to OM 3.  4. Transesophageal echo on 4/17/2017: The ejection fraction was 55%.  There was moderate left atrial enlargement and mild to moderate right atrial enlargement.  The atrial septum was redundant, but no PFO was seen on the saline study.  There was no significant valvular disease.  5. Status post cavotricuspid isthmus ablation for typical atrial flutter on 4/18/2017 by Dr. Gustafson.  6. Carotid Doppler ultrasound on 4/10/2017: There was 50-59% stenosis in the right internal carotid artery.  There was 1-15% disease in the left internal carotid artery.  7. Echocardiogram on 10/24/2017: Ejection fraction was 56%.  There was grade 2 diastolic dysfunction.  There was aortic sclerosis without stenosis.  8. Echocardiogram 4/10/2021.  LVEF 57%.  Diastolic function normal.  RV cavity is mildly dilated.  Mild calcification of the mitral valve.  Trace mitral valve regurgitation.  No stenosis.  No aortic valve regurgitation or stenosis.    Lipid Panel    Lipid Panel 4/12/21 8/10/21   Total Cholesterol 106    Total Cholesterol  125   Triglycerides 68 71   HDL Cholesterol 37 (A) 42   VLDL Cholesterol 15 15   LDL Cholesterol  54 68   LDL/HDL Ratio 1.50    (A) Abnormal value       Comments are available for some flowsheets but are not being displayed.               ASSESSMENT & PLAN      Diagnosis Plan   1. Cerebrovascular accident (CVA) due to embolism of left middle cerebral artery (HCC)     2. Coronary artery disease involving coronary bypass graft of native heart without angina  pectoris     3. Hyperlipidemia, unspecified hyperlipidemia type     4. History of atrial flutter     5. Status post ablation of atrial flutter           SUMMARY/DISCUSSION  1. Subacute CVA.  Reports continued problems with balance from his last stroke.  No recent falls.  Continue aspirin, apixaban, rosuvastatin.  2. Chronic coronary disease.  Patient with history of CABG. currently denies angina or dyspnea.  Continue aspirin, rosuvastatin, ramipril.  3. Hyperlipidemia.  LDL at goal 68 as documented above.  Continue rosuvastatin 20 mg/day.  4. History of a flutter with prior ablation.  No current problems with irregular heartbeat.  Continue apixaban for anticoagulation.  5. Follow-up with Dr. Wright in 6 months        MEDICATIONS         Discharge Medications          Accurate as of November 23, 2021  2:15 PM. If you have any questions, ask your nurse or doctor.            Continue These Medications      Instructions Start Date   aluminum-magnesium hydroxide-simethicone 400-400-40 MG/5ML suspension  Commonly known as: MAALOX MAX   20 mL, Oral, Every 6 Hours PRN      amLODIPine 2.5 MG tablet  Commonly known as: NORVASC   2.5 mg, Oral, Daily      apixaban 5 MG tablet tablet  Commonly known as: ELIQUIS   5 mg, Oral, 2 Times Daily      Aspirin Low Dose 81 MG EC tablet  Generic drug: aspirin   81 mg, Oral, Daily      HYDROcodone-acetaminophen  MG per tablet  Commonly known as: NORCO   1 tablet, Oral, Every 6 Hours PRN, Take at least 6 hours apart. Max 2/day.  DNF 11/9/2021      Myrbetriq 25 MG tablet sustained-release 24 hour 24 hr tablet  Generic drug: Mirabegron ER   25 mg, Oral, Daily      nitroglycerin 0.4 MG SL tablet  Commonly known as: NITROSTAT   0.4 mg, Sublingual, Every 5 Minutes PRN, Take no more than 3 doses in 15 minutes.       pantoprazole 40 MG EC tablet  Commonly known as: PROTONIX   40 mg, Oral, Daily      ramipril 5 MG capsule  Commonly known as: ALTACE   5 mg, Oral, Daily      rosuvastatin 20  MG tablet  Commonly known as: Crestor   20 mg, Oral, Daily      sucralfate 1 GM/10ML suspension  Commonly known as: Carafate   1 g, Oral, 2 Times Daily                 **Dragon Disclaimer:   Much of this encounter note is an electronic transcription/translation of spoken language to printed text. The electronic translation of spoken language may permit erroneous, or at times, nonsensical words or phrases to be inadvertently transcribed. Although I have reviewed the note for such errors, some may still exist.

## 2021-11-29 ENCOUNTER — OFFICE VISIT (OUTPATIENT)
Dept: PAIN MEDICINE | Facility: CLINIC | Age: 76
End: 2021-11-29

## 2021-11-29 VITALS
WEIGHT: 170.6 LBS | TEMPERATURE: 97.3 F | HEIGHT: 68 IN | OXYGEN SATURATION: 99 % | RESPIRATION RATE: 16 BRPM | DIASTOLIC BLOOD PRESSURE: 81 MMHG | SYSTOLIC BLOOD PRESSURE: 180 MMHG | HEART RATE: 57 BPM | BODY MASS INDEX: 25.85 KG/M2

## 2021-11-29 DIAGNOSIS — G89.29 CHRONIC PAIN OF BOTH HIPS: ICD-10-CM

## 2021-11-29 DIAGNOSIS — M25.552 CHRONIC PAIN OF BOTH HIPS: ICD-10-CM

## 2021-11-29 DIAGNOSIS — M25.50 ARTHRALGIA OF MULTIPLE JOINTS: ICD-10-CM

## 2021-11-29 DIAGNOSIS — M47.816 LUMBAR FACET ARTHROPATHY: ICD-10-CM

## 2021-11-29 DIAGNOSIS — M54.50 CHRONIC BILATERAL LOW BACK PAIN WITHOUT SCIATICA: ICD-10-CM

## 2021-11-29 DIAGNOSIS — M25.551 CHRONIC PAIN OF BOTH HIPS: ICD-10-CM

## 2021-11-29 DIAGNOSIS — G89.29 CHRONIC BILATERAL LOW BACK PAIN WITHOUT SCIATICA: Primary | ICD-10-CM

## 2021-11-29 DIAGNOSIS — M53.3 SACROILIAC JOINT DYSFUNCTION OF BOTH SIDES: ICD-10-CM

## 2021-11-29 DIAGNOSIS — M54.50 CHRONIC BILATERAL LOW BACK PAIN WITHOUT SCIATICA: Primary | ICD-10-CM

## 2021-11-29 DIAGNOSIS — G89.29 CHRONIC BILATERAL LOW BACK PAIN WITHOUT SCIATICA: ICD-10-CM

## 2021-11-29 DIAGNOSIS — Z79.899 ENCOUNTER FOR LONG-TERM (CURRENT) USE OF HIGH-RISK MEDICATION: ICD-10-CM

## 2021-11-29 PROCEDURE — 99214 OFFICE O/P EST MOD 30 MIN: CPT | Performed by: NURSE PRACTITIONER

## 2021-11-29 RX ORDER — HYDROCODONE BITARTRATE AND ACETAMINOPHEN 10; 325 MG/1; MG/1
1 TABLET ORAL EVERY 6 HOURS PRN
Qty: 60 TABLET | Refills: 0 | Status: SHIPPED | OUTPATIENT
Start: 2022-01-08 | End: 2022-01-28 | Stop reason: SDUPTHER

## 2021-11-29 RX ORDER — HYDROCODONE BITARTRATE AND ACETAMINOPHEN 10; 325 MG/1; MG/1
1 TABLET ORAL EVERY 6 HOURS PRN
Qty: 60 TABLET | Refills: 0 | Status: SHIPPED | OUTPATIENT
Start: 2021-12-09 | End: 2022-01-28 | Stop reason: SDUPTHER

## 2021-11-29 NOTE — PROGRESS NOTES
"CHIEF COMPLAINT  F/U for back pain .  Pt states his pain level has stayed the same .     Subjective   Madhu Santoro is a 75 y.o. male  who presents for follow-up.  He has a history of back pain and diffuse joint pain.  Today his pain is 8/10VAS in severity.  He continues with Norco 10/325 2/day. This medication regimen decreases his pain, he states \"it helps\". ADLs by self. He denies any side effects including somnolence or constipation.     He continuous to utilize a heating pad for his low back which is also helpful.     He has no interest in procedures due to a friend having developing foot drop following an injection which was administered for back pain.     No improvement with PT.     Not a candidate for NSAIDs--on Eliquis.     Patient remained masked during entire encounter. No cough present. I donned a mask and eye protection throughout entire visit. Prior to donning mask and eye protection, hand hygiene was performed, as well as when it was doffed.  I was closer than 6 feet, but not for an extended period of time. No obvious exposure to any bodily fluids.    Back Pain  This is a chronic problem. The current episode started more than 1 year ago. The problem occurs constantly. The problem is unchanged. The pain is present in the lumbar spine (across his belt line). The quality of the pain is described as aching. Radiates to: whole leg aching pain bilaterally with standing and walking. The pain is at a severity of 8/10. The symptoms are aggravated by standing, sitting and position (walking). Stiffness is present all day. Associated symptoms include numbness (bilateral feet) and weakness. Pertinent negatives include no abdominal pain, chest pain, dysuria, fever or headaches. He has tried analgesics, heat and home exercises (Norco 10/325) for the symptoms. The treatment provided moderate relief.   Joint Pain  This is a chronic problem. The current episode started more than 1 year ago. The problem occurs " "constantly. The problem has been unchanged. Associated symptoms include arthralgias, numbness (bilateral feet) and weakness. Pertinent negatives include no abdominal pain, chest pain, congestion, coughing, fatigue, fever or headaches. The symptoms are aggravated by standing and walking (ROM). Treatments tried: Norco 10/325, steroid injections.      PEG Assessment   What number best describes your pain on average in the past week?8  What number best describes how, during the past week, pain has interfered with your enjoyment of life?8  What number best describes how, during the past week, pain has interfered with your general activity?  8    The following portions of the patient's history were reviewed and updated as appropriate: allergies, current medications, past family history, past medical history, past social history, past surgical history and problem list.    Review of Systems   Constitutional: Negative for activity change, fatigue and fever.   HENT: Negative for congestion.    Eyes: Negative for visual disturbance.   Respiratory: Negative for cough and chest tightness.    Cardiovascular: Negative for chest pain.   Gastrointestinal: Negative for abdominal pain, constipation and diarrhea.   Genitourinary: Negative for difficulty urinating and dysuria.   Musculoskeletal: Positive for arthralgias and back pain.   Neurological: Positive for weakness and numbness (bilateral feet). Negative for dizziness, light-headedness and headaches.   Psychiatric/Behavioral: Negative for agitation, sleep disturbance and suicidal ideas. The patient is not nervous/anxious.      --  The aforementioned information the Chief Complaint section and above subjective data including any HPI data, and also the Review of Systems data, has been personally reviewed and affirmed.  --    Vitals:    11/29/21 1042   BP: 180/81   Pulse: 57   Resp: 16   Temp: 97.3 °F (36.3 °C)   SpO2: 99%   Weight: 77.4 kg (170 lb 9.6 oz)   Height: 172.7 cm (68\") "   PainSc:   8   PainLoc: Back     Objective   Physical Exam  Vitals and nursing note reviewed.   Constitutional:       Appearance: Normal appearance. He is well-developed.   Eyes:      General: Lids are normal.   Cardiovascular:      Rate and Rhythm: Normal rate.   Pulmonary:      Effort: Pulmonary effort is normal.   Musculoskeletal:      Cervical back: Normal range of motion.      Lumbar back: Tenderness and bony tenderness present. Decreased range of motion.      Comments: Diffuse arthritic changes   Neurological:      Mental Status: He is alert and oriented to person, place, and time.   Psychiatric:         Attention and Perception: Attention normal.         Mood and Affect: Mood normal.         Speech: Speech normal.         Behavior: Behavior normal.         Judgment: Judgment normal.       Assessment/Plan   Diagnoses and all orders for this visit:    1. Chronic bilateral low back pain without sciatica (Primary)    2. Chronic pain of both hips    3. Encounter for long-term (current) use of high-risk medication    4. Sacroiliac joint dysfunction of both sides    5. Lumbar facet arthropathy    6. Arthralgia of multiple joints      --- The urine drug screen confirmation from 9/28/2021 has been reviewed and the result is appropriate based on patient history and RUTH report  --- CSA updated 3/3/2021  --- Refill Norco 10/325. DNF 12/9/2021 applied. Patient appears stable with current regimen. No adverse effects. Regarding continuation of opioids, there is no evidence of aberrant behavior or any red flags.  The patient continues with appropriate response to opioid therapy. ADL's remain intact by self.   --- Follow-up 2 month or sooner if needed.     RUTH REPORT  As part of the patient's treatment plan, I am prescribing controlled substances. The patient has been made aware of appropriate use of such medications, including potential risk of somnolence, limited ability to drive and/or work safely, and the potential  for dependence or overdose. It has also bee made clear that these medications are for use by this patient only, without concomitant use of alcohol or other substances unless prescribed.     Patient has completed prescribing agreement detailing terms of continued prescribing of controlled substances, including monitoring RUTH reports, urine drug screening, and pill counts if necessary. The patient is aware that inappropriate use will results in cessation of prescribing such medications.    As the clinician, I personally reviewed the RUTH from 11/29/2021 while the patient was in the office today.    History and physical exam exhibit continued safe and appropriate use of controlled substances.    Dictated utilizing Dragon dictation.

## 2021-12-02 NOTE — THERAPY TREATMENT NOTE
Outpatient Physical Therapy Ortho Treatment Note  [facility]     Patient Name: Madhu Santoro  : 1945  MRN: [id number]  Today's Date: 2021      Visit Date: 2021    Visit Dx:    ICD-10-CM ICD-9-CM   1. Chronic bilateral low back pain without sciatica  M54.5 724.2    G89.29 338.29   2. Weakness of both lower extremities  R29.898 729.89   3. Decreased range of motion  M25.60 719.50   4. Hip pain, bilateral  M25.551 719.45    M25.552        Patient Active Problem List   Diagnosis   • Anxiety   • Arthritis   • Chronic coronary artery disease   • HLD (hyperlipidemia)   • Benign essential hypertension   • DDD (degenerative disc disease), lumbosacral   • Cerebrovascular accident (CMS/HCC)   • Encounter for screening for cardiovascular disorders   • Gastroesophageal reflux disease   • Hyperlipidemia   • Stenosis of carotid artery   • Former smoker   • History of cardiac catheterization   • S/P ablation of atrial flutter   • Typical atrial flutter (CMS/HCC)   • S/P CABG (coronary artery bypass graft)   • Adenomatous polyp of ascending colon   • Abdominal bloating   • Stroke (CMS/HCC)   • PAD (peripheral artery disease) (CMS/HCC)   • Acute cholecystitis   • Right upper quadrant abdominal pain   • Chronic pain of both hips   • Chronic bilateral low back pain without sciatica   • Sacroiliac joint dysfunction of both sides   • Encounter for long-term (current) use of high-risk medication   • Lumbar facet arthropathy        Past Medical History:   Diagnosis Date   • Anxiety    • Arthritis    • Atrial flutter (CMS/HCC)     Status post cavotricuspid isthmus ablation by Dr. Gustafson on 17   • Mandel esophagus    • Benign essential hypertension    • CAD (coronary artery disease)     3 vessel CABG 17 by Dr. Cortez: ROSARIO-prox LAD, SVG-OM1, SVG-OM3   • Carotid artery disease (CMS/HCC)     Status post carotid endarterectomy - USG 4/10/17: 50-59% NICHELLE, 1-15% LICA.    • Colonic polyp    • DDD  (degenerative disc disease), lumbosacral    • GERD (gastroesophageal reflux disease)    • H/O bone density study 2013   • H/O complete eye exam 2014   • HLD (hyperlipidemia)    • Hypertension    • Lipid screening 05/31/2013   • Low back pain     physical therapy [facility] [place] rehab 5-12-10   • Screening for prostate cancer 07/07/2015   • Stroke (CMS/HCC)         Past Surgical History:   Procedure Laterality Date   • CARDIAC CATHETERIZATION N/A 4/10/2017    Procedure: Left Heart Cath;  Surgeon: Marjorie Healy MD;  [facility];  Service:    • CARDIAC CATHETERIZATION N/A 4/10/2017    Procedure: Coronary angiography;  Surgeon: Marjorie Healy MD;  Location:  [facility] CATH INVASIVE LOCATION;  Service:    • CARDIAC CATHETERIZATION N/A 4/10/2017    Procedure: Left ventriculography;  Surgeon: Marjorie Healy MD;  [facility];  Service:    • CARDIAC ELECTROPHYSIOLOGY PROCEDURE N/A 4/18/2017    Procedure: Ablation atrial flutter;  Surgeon: Jose Antonio Gustafson MD;  [facility];  Service:    • CHOLECYSTECTOMY     • CHOLECYSTECTOMY WITH INTRAOPERATIVE CHOLANGIOGRAM N/A 9/7/2019    Procedure: Laparoscopic cholecystectomy with intraoperative cholangiogram;  Surgeon: Gauri Travis MD;  Location: [facility] MAIN OR;  Service: General   • COLONOSCOPY  01/06/2015    Diverticulosis, one TA   • COLONOSCOPY N/A 2/14/2019    tics, NBIH, adenomatous polyp x 2   • CORONARY ARTERY BYPASS GRAFT N/A 4/11/2017    Procedure: AR STERNOTOMY CORONARY ARTERY BYPASS GRAFT TIMES 3 USING LEFT INTERNAL MAMMARY ARTERY AND LEFT GREATER SAPHENOUS VEIN GRAFT PER ENDOSCOPIC VEIN HARVESTING AND PRP ;  Surgeon: Temo Cortez MD;  Location: [facility] MAIN OR;  Service:    • ENDOSCOPY  01/06/2015    HH, Mandel's esophagus   • ENDOSCOPY N/A 2/14/2019    Z line irregular, HH, Mandel's esophagus   • VASECTOMY                         PT Assessment/Plan     Row Name 04/05/21 1400           PT Assessment    Assessment Comments  Pt arrived 19 minutes late for appt, mixed up his time.  He continues to c/o constant pain in thony LBP across lower lumbar and laterally.  Trialed manual techniques including STM and PNF light manual resistance and will assess response next visit.  Tolerance to HL progressing.  No goals met yet, slow progress due to high level of pain and inconsistent attendance.  -JA        PT Plan    PT Plan Comments  assess rsponse to last visit (trialed manual techniques  -WILL       User Key  (r) = Recorded By, (t) = Taken By, (c) = Cosigned By    Initials Name Provider Type    Betty Garnett, PT Physical Therapist          Modalities     Row Name 04/05/21 1100             Moist Heat    MH Applied  Yes  -JA      Location  under lumbar  -JA      Rx Minutes  10 mins  -JA      MH Prior to Rx  -- with supine stretches  -WILL        User Key  (r) = Recorded By, (t) = Taken By, (c) = Cosigned By    Initials Name Provider Type    Betty Garnett, PT Physical Therapist        OP Exercises     Row Name 04/05/21 1100             Subjective Comments    Subjective Comments  The back pain is there pretty much all the time. Pt arrived at 11:19 for 11:00 appt, thought it was11:30 appt  -JA         Subjective Pain    Able to rate subjective pain?  yes  -JA      Pre-Treatment Pain Level  6  -JA         Total Minutes    22006 - PT Therapeutic Exercise Minutes  15  -JA      11635 - PT Manual Therapy Minutes  10  -JA         Exercise 1    Exercise Name 1  Nustep   -JA      Time 1  5min  -JA      Additional Comments  L1  -JA         Exercise 2    Exercise Name 2  supine Ankle pumps  -JA      Reps 2  resume next visit  -JA         Exercise 3    Exercise Name 3  LTR in small range  -JA      Cueing 3  Verbal;Tactile  -JA      Reps 3  5  -JA      Time 3  3 sec  -JA         Exercise 4    Exercise Name 4  supine heel slide  -JA      Cueing 4  Verbal;Tactile  -JA      Reps 4  5  -JA      Time 4  3sec  -JA          Exercise 5    Exercise Name 5  PPT   -WILL      Cueing 5  Verbal;Tactile  -WILL      Sets 5  2  -JA      Reps 5  5  -JA      Time 5  3sec  -JA      Additional Comments  copious cuing required, difficulty achieving  -JA         Exercise 6    Exercise Name 6  mini squat  -JA      Reps 6  resume next visit  -JA         Exercise 7    Exercise Name 7  standing MIP  -JA      Reps 7  resume next visit  -JA         Exercise 8    Exercise Name 8  sidestep  -JA      Reps 8  x 3  -JA         Exercise 9    Exercise Name 9  LAQ thony  -JA      Reps 9  resume next visit  -JA         Exercise 10    Exercise Name 10  seated HS stretch  -JA      Reps 10  resume next visit  -JA        User Key  (r) = Recorded By, (t) = Taken By, (c) = Cosigned By    Initials Name Provider Type    Betty Garnett, PT Physical Therapist                      Manual Rx (last 36 hours)      Manual Treatments     Row Name 04/05/21 1100             Total Minutes    63285 - PT Manual Therapy Minutes  10  -JA         Manual Rx 1    Manual Rx 1 Location  R SL for L/thony LB  -WILL      Manual Rx 1 Type  STM, noted TPs in pvm L>R  -      Manual Rx 1 Grade  PNF resistance trunk rotation  -WILL        User Key  (r) = Recorded By, (t) = Taken By, (c) = Cosigned By    Initials Name Provider Type    Betty Garnett, PT Physical Therapist          PT OP Goals     Row Name 04/05/21 1400          PT Short Term Goals    STG Date to Achieve  04/11/21  -WILL     STG 1  Patient will be independent and compliant with initial HEP.  -WILL     STG 1 Progress  Ongoing;Progressing  -WILL     STG 1 Progress Comments  cues required  -WILL     STG 2  Patient will demonstrate correct posture in sitting and standing to decrease strain/pain on spine.  -WILL     STG 2 Progress  Ongoing  -WILL     STG 3  Pt will tolerate 5 minutes of hooklying or supine position to improve ability to perform therapeutic exercise.  -WILL     STG 3 Progress  Progressing  -WILL        Long Term Goals    LTG Date  to Achieve  05/11/21  -WILL     LTG 1  Pt will be independent with advanced HEP for spinal stabilization and LE/core strengthening.  -WILL     LTG 1 Progress  Ongoing  -WILL     LTG 2  Pt will demonstrate correct body mechanics with bending, reaching, and lifting ADL’s.  -WILL     LTG 2 Progress  Progressing  -WILL     LTG 3  Pt will score 50% or less on Oswestry Disability Index indicating decrease in perceived functional disability.  -WILL     LTG 3 Progress  Ongoing  -WILL     LTG 4  Pt will report 25% decrease in pain.  -WILL     LTG 4 Progress  Ongoing  -WILL       User Key  (r) = Recorded By, (t) = Taken By, (c) = Cosigned By    Initials Name Provider Type    Betty Garnett PT Physical Therapist          Therapy Education  Education Details: Discussed activity and posture.  Shortened his length of sidestep to improve form--he reported difficulty keeping foot forward bc it wants to turn out.  Given: Symptoms/condition management, Pain management, Posture/body mechanics  Program: Reinforced  How Provided: Verbal, Demonstration  Provided to: Patient  Level of Understanding: Teach back education performed, Verbalized, Demonstrated              Time Calculation:   Start Time: 1119  Stop Time: 1145  Time Calculation (min): 26 min  Therapy Charges for Today     Code Description Service Date Service Provider Modifiers Qty    [id number]  PT THER PROC EA 15 MIN 4/5/2021 Betty Valdez, PT GP 1    [id number]  PT MANUAL THERAPY EA 15 MIN 4/5/2021 Betty Valdez PT GP 1                    Betty Valdez PT  4/5/2021      Medical Necessity Clause: This procedure was medically necessary because the lesions that were treated were:\\na/s did not help/not successful Validate Note Data When Using Inventory: Yes Total Volume Injected (Ccs- Only Use Numbers And Decimals): 2 Concentration Of Solution Injected (Mg/Ml): 5.0 Administered By (Optional): veronica wood X Size Of Lesion In Cm (Optional): 0 Consent: The risks of atrophy were reviewed with the patient. Detail Level: Detailed Kenalog Preparation: Kenalog Include Z78.9 (Other Specified Conditions Influencing Health Status) As An Associated Diagnosis?: No Treatment Number (Optional): 4

## 2021-12-06 ENCOUNTER — TELEPHONE (OUTPATIENT)
Dept: FAMILY MEDICINE CLINIC | Facility: CLINIC | Age: 76
End: 2021-12-06

## 2021-12-06 NOTE — TELEPHONE ENCOUNTER
PATIENT CALLED TO CHECK WITH THE NURSE IF THERE'S ANY SAMPLES OF  apixaban (ELIQUIS) 5 MG tablet tablet    [phone]

## 2021-12-09 ENCOUNTER — TELEPHONE (OUTPATIENT)
Dept: CARDIOLOGY | Facility: CLINIC | Age: 76
End: 2021-12-09

## 2021-12-09 NOTE — TELEPHONE ENCOUNTER
Pt called requesting eliquis samples. Sat 2.5mg samples up front. Called the pt and instructed to double these and  at his convenience, he verbalized understanding. //HG

## 2021-12-27 ENCOUNTER — TELEPHONE (OUTPATIENT)
Dept: FAMILY MEDICINE CLINIC | Facility: CLINIC | Age: 76
End: 2021-12-27

## 2021-12-27 NOTE — TELEPHONE ENCOUNTER
Caller: Madhu Santoro    Relationship: Self    Best call back number: [phone]    Who are you requesting to speak with (clinical staff, provider,  specific staff member): GIRISH    What was the call regarding: SAMPLES OF ELOQUIS    Do you require a callback: YES

## 2022-01-28 ENCOUNTER — OFFICE VISIT (OUTPATIENT)
Dept: PAIN MEDICINE | Facility: CLINIC | Age: 77
End: 2022-01-28

## 2022-01-28 VITALS
RESPIRATION RATE: 16 BRPM | OXYGEN SATURATION: 95 % | TEMPERATURE: 98 F | WEIGHT: 178.2 LBS | SYSTOLIC BLOOD PRESSURE: 158 MMHG | DIASTOLIC BLOOD PRESSURE: 90 MMHG | HEART RATE: 76 BPM | HEIGHT: 68 IN | BODY MASS INDEX: 27.01 KG/M2

## 2022-01-28 DIAGNOSIS — M25.552 CHRONIC PAIN OF BOTH HIPS: ICD-10-CM

## 2022-01-28 DIAGNOSIS — M25.551 CHRONIC PAIN OF BOTH HIPS: ICD-10-CM

## 2022-01-28 DIAGNOSIS — M25.50 ARTHRALGIA OF MULTIPLE JOINTS: ICD-10-CM

## 2022-01-28 DIAGNOSIS — G89.29 CHRONIC BILATERAL LOW BACK PAIN WITHOUT SCIATICA: ICD-10-CM

## 2022-01-28 DIAGNOSIS — M53.3 SACROILIAC JOINT DYSFUNCTION OF BOTH SIDES: ICD-10-CM

## 2022-01-28 DIAGNOSIS — G89.29 CHRONIC BILATERAL LOW BACK PAIN WITHOUT SCIATICA: Primary | ICD-10-CM

## 2022-01-28 DIAGNOSIS — G89.29 CHRONIC PAIN OF BOTH HIPS: ICD-10-CM

## 2022-01-28 DIAGNOSIS — M54.50 CHRONIC BILATERAL LOW BACK PAIN WITHOUT SCIATICA: ICD-10-CM

## 2022-01-28 DIAGNOSIS — M54.50 CHRONIC BILATERAL LOW BACK PAIN WITHOUT SCIATICA: Primary | ICD-10-CM

## 2022-01-28 DIAGNOSIS — Z79.899 ENCOUNTER FOR LONG-TERM (CURRENT) USE OF HIGH-RISK MEDICATION: ICD-10-CM

## 2022-01-28 DIAGNOSIS — M47.816 LUMBAR FACET ARTHROPATHY: ICD-10-CM

## 2022-01-28 PROCEDURE — 99214 OFFICE O/P EST MOD 30 MIN: CPT | Performed by: NURSE PRACTITIONER

## 2022-01-28 RX ORDER — HYDROCODONE BITARTRATE AND ACETAMINOPHEN 10; 325 MG/1; MG/1
1 TABLET ORAL EVERY 6 HOURS PRN
Qty: 60 TABLET | Refills: 0 | Status: SHIPPED | OUTPATIENT
Start: 2022-03-07 | End: 2022-02-14

## 2022-01-28 RX ORDER — HYDROCODONE BITARTRATE AND ACETAMINOPHEN 10; 325 MG/1; MG/1
1 TABLET ORAL EVERY 6 HOURS PRN
Qty: 60 TABLET | Refills: 0 | Status: SHIPPED | OUTPATIENT
Start: 2022-02-09 | End: 2022-03-28 | Stop reason: SDUPTHER

## 2022-01-28 NOTE — PROGRESS NOTES
"CHIEF COMPLAINT  F/U back pain.  Pt states his pain level has remained the same since last office visit.    Subjective   Madhu Santoro is a 76 y.o. male  who presents for follow-up.  He has a history of back pain.  Today his pain is 8/10VAS in severity.  He continues with Norco 10/325 2/day.  This medication regimen decreases his pain.  He states \"If I didn't have it I probably couldn't walk\".  ADLs by self. He does report constipation, he utilizes OTC medications for this. He denies any other side effects including somnolence.     He has no interest in procedures due to a friend having developing foot drop following an injection which was administered for back pain.      No improvement with PT.      Not a candidate for NSAIDs--on Eliquis.     Patient remained masked during entire encounter. No cough present. I donned a mask and eye protection throughout entire visit. Prior to donning mask and eye protection, hand hygiene was performed, as well as when it was doffed.  I was closer than 6 feet, but not for an extended period of time. No obvious exposure to any bodily fluids.    Back Pain  This is a chronic problem. The current episode started more than 1 year ago. The problem occurs constantly. Progression since onset: unchanged since last office visit. The pain is present in the lumbar spine (across his belt line). The quality of the pain is described as aching. Radiates to: whole leg aching pain bilaterally with standing and walking. The pain is at a severity of 8/10. The symptoms are aggravated by standing, sitting and position (walking). Stiffness is present all day. Associated symptoms include numbness (bilateral feet). Pertinent negatives include no abdominal pain, chest pain, dysuria, fever, headaches or weakness. He has tried analgesics, heat and home exercises (Norco 10/325) for the symptoms. The treatment provided moderate relief.   Joint Pain  This is a chronic problem. The current episode started more " than 1 year ago. The problem occurs constantly. The problem has been unchanged. Associated symptoms include arthralgias and numbness (bilateral feet). Pertinent negatives include no abdominal pain, chest pain, congestion, coughing, fatigue, fever, headaches or weakness. The symptoms are aggravated by standing and walking (ROM). He has tried heat (Norco 10/325, steroid injections) for the symptoms.      PEG Assessment   What number best describes your pain on average in the past week?10  What number best describes how, during the past week, pain has interfered with your enjoyment of life?10  What number best describes how, during the past week, pain has interfered with your general activity?  10    The following portions of the patient's history were reviewed and updated as appropriate: allergies, current medications, past family history, past medical history, past social history, past surgical history and problem list.    Review of Systems   Constitutional: Negative for activity change, fatigue and fever.   HENT: Negative for congestion.    Eyes: Negative for visual disturbance.   Respiratory: Negative for cough and chest tightness.    Cardiovascular: Negative for chest pain.   Gastrointestinal: Positive for constipation. Negative for abdominal pain and diarrhea.   Genitourinary: Negative for difficulty urinating and dysuria.   Musculoskeletal: Positive for arthralgias and back pain.   Neurological: Positive for numbness (bilateral feet). Negative for dizziness, weakness, light-headedness and headaches.   Psychiatric/Behavioral: Negative for agitation, sleep disturbance and suicidal ideas. The patient is not nervous/anxious.      --  The aforementioned information the Chief Complaint section and above subjective data including any HPI data, and also the Review of Systems data, has been personally reviewed and affirmed.  --    Vitals:    01/28/22 1052   BP: 158/90   Pulse: 76   Resp: 16   Temp: 98 °F (36.7 °C)  "  SpO2: 95%   Weight: 80.8 kg (178 lb 3.2 oz)   Height: 172.7 cm (68\")   PainSc:   8   PainLoc: Back     Objective   Physical Exam  Vitals and nursing note reviewed.   Constitutional:       Appearance: Normal appearance. He is well-developed.   Eyes:      General: Lids are normal.   Cardiovascular:      Rate and Rhythm: Normal rate.   Pulmonary:      Effort: Pulmonary effort is normal.   Musculoskeletal:      Right elbow: Tenderness present.      Left elbow: Tenderness present.      Cervical back: Normal range of motion.      Lumbar back: Tenderness and bony tenderness present. Decreased range of motion.      Right knee: Tenderness present.      Left knee: Tenderness present.      Comments: Diffuse arthritic changes   Neurological:      Mental Status: He is alert and oriented to person, place, and time.   Psychiatric:         Attention and Perception: Attention normal.         Mood and Affect: Mood normal.         Speech: Speech normal.         Behavior: Behavior normal.         Judgment: Judgment normal.       Assessment/Plan   Diagnoses and all orders for this visit:    1. Chronic bilateral low back pain without sciatica (Primary)    2. Chronic pain of both hips    3. Encounter for long-term (current) use of high-risk medication    4. Sacroiliac joint dysfunction of both sides    5. Arthralgia of multiple joints    6. Lumbar facet arthropathy      --- The urine drug screen confirmation from 9/28/2021 has been reviewed and the result is appropriate based on patient history and RUTH report  --- CSA updated 3/3/2021  --- Refill Norco 10/325. DNF 2/9/2022 applied.  Patient appears stable with current regimen. No adverse effects. Regarding continuation of opioids, there is no evidence of aberrant behavior or any red flags.  The patient continues with appropriate response to opioid therapy. ADL's remain intact by self.   --- Follow-up 2 months or sooner if needed.      RUTH REPORT  As part of the patient's treatment " plan, I am prescribing controlled substances. The patient has been made aware of appropriate use of such medications, including potential risk of somnolence, limited ability to drive and/or work safely, and the potential for dependence or overdose. It has also bee made clear that these medications are for use by this patient only, without concomitant use of alcohol or other substances unless prescribed.     Patient has completed prescribing agreement detailing terms of continued prescribing of controlled substances, including monitoring RUTH reports, urine drug screening, and pill counts if necessary. The patient is aware that inappropriate use will results in cessation of prescribing such medications.    As the clinician, I personally reviewed the RUTH from 1/28/2022 while the patient was in the office today.    History and physical exam exhibit continued safe and appropriate use of controlled substances.    Dictated utilizing Dragon dictation.     This document is intended for medical expert use only. Reading of this document by patients and/or patient's family without participating medical staff guidance may result in misinterpretation and unintended morbidity.   Any interpretation of such data is the responsibility of the patient and/or family member responsible for the patient in concert with their primary or specialist providers, not to be left for sources of online searches such as [employer], [employer] or similar queries. Relying on these approaches to knowledge may result in misinterpretation, misguided goals of care and even death should patients or family members try recommendations outside of the realm of professional medical care in a supervised way.

## 2022-02-12 NOTE — PROGRESS NOTES
Subjective   Madhu Santoro is a 76 y.o. male.     History of Present Illness     Chief Complaint:   Chief Complaint   Patient presents with   • Coronary Artery Disease     review labs   • Heartburn   • Hyperlipidemia       Madhu Santoro 76 y.o. male who presents today for Medical Management of the below listed issues and medication refills. He  has a problem list of   Patient Active Problem List   Diagnosis   • Anxiety   • Arthritis   • Chronic coronary artery disease   • HLD (hyperlipidemia)   • Benign essential hypertension   • DDD (degenerative disc disease), lumbosacral   • Cerebrovascular accident (HCC)   • Encounter for screening for cardiovascular disorders   • Gastroesophageal reflux disease   • Hyperlipidemia   • Stenosis of carotid artery   • Former smoker   • History of cardiac catheterization   • S/P ablation of atrial flutter   • Typical atrial flutter ([facility])   • S/P CABG (coronary artery bypass graft)   • Adenomatous polyp of ascending colon   • Abdominal bloating   • Stroke ([facility])   • PAD (peripheral artery disease) ([facility])   • Acute cholecystitis   • Right upper quadrant abdominal pain   • Chronic pain of both hips   • Chronic bilateral low back pain without sciatica   • Sacroiliac joint dysfunction of both sides   • Encounter for long-term (current) use of high-risk medication   • Lumbar facet arthropathy   • Stroke-like symptoms   • CVA (cerebral vascular accident) ([facility])   • Personal history of colonic polyps   • Arthralgia of multiple joints   .  Since the last visit, He has overall felt well.  he has been compliant with   Current Outpatient Medications:   •  aluminum-magnesium hydroxide-simethicone (MAALOX MAX) 400-400-40 MG/5ML suspension, Take 20 mL by mouth Every 6 (Six) Hours As Needed for Indigestion or Heartburn., Disp: , Rfl:   •  amLODIPine (NORVASC) 2.5 MG tablet, Take 1 tablet by mouth Daily., Disp: 30 tablet, Rfl: 11  •  apixaban (ELIQUIS) 5 MG tablet tablet, Take 1 tablet by mouth 2  "(Two) Times a Day. Lot # [id number] Exp 10/23, Disp: 28 tablet, Rfl: 0  •  aspirin (aspirin) 81 MG EC tablet, Take 1 tablet by mouth Daily., Disp: 30 tablet, Rfl: 0  •  HYDROcodone-acetaminophen (NORCO)  MG per tablet, Take 1 tablet by mouth Every 6 (Six) Hours As Needed for Moderate Pain . Take at least 6 hours apart. Max 2/day., Disp: 60 tablet, Rfl: 0  •  Myrbetriq 25 MG tablet sustained-release 24 hour 24 hr tablet, Take 25 mg by mouth Daily., Disp: , Rfl:   •  nitroglycerin (NITROSTAT) 0.4 MG SL tablet, Place 1 tablet under the tongue Every 5 (Five) Minutes As Needed for Chest Pain. Take no more than 3 doses in 15 minutes., Disp: 20 tablet, Rfl: 2  •  sucralfate (Carafate) 1 GM/10ML suspension, Take 10 mL by mouth 2 (Two) Times a Day., Disp: 420 mL, Rfl: 1  •  pantoprazole (PROTONIX) 40 MG EC tablet, Take 1 tablet by mouth Daily., Disp: 90 tablet, Rfl: 1  •  ramipril (ALTACE) 5 MG capsule, Take 1 capsule by mouth Daily., Disp: 90 capsule, Rfl: 1  •  rosuvastatin (Crestor) 20 MG tablet, Take 1 tablet by mouth Daily., Disp: 90 tablet, Rfl: 1.  He denies medication side effects.    All of the other chronic condition(s) listed above are stable w/o issues.    /75 (BP Location: Left arm, Patient Position: Sitting, Cuff Size: Adult)   Pulse 65   Temp 97.8 °F (36.6 °C) (Skin)   Resp 16   Ht 172.7 cm (68\")   Wt 79.4 kg (175 lb)   SpO2 98%   BMI 26.61 kg/m²     Results for orders placed or performed in visit on 01/17/22   Lipoprotein NMR    Specimen: Blood   Result Value Ref Range    LDL-P 865 <1,000 nmol/L    LDL-C (NIH Calc) 66 0 - 99 mg/dL    HDL-C 47 >39 mg/dL    Triglycerides 75 0 - 149 mg/dL    Total Cholesterol 128 100 - 199 mg/dL    HDL-P (Total) 30.7 >=30.5 umol/L    Small LDL-P 393 <=527 nmol/L    LDL Size 20.7 >20.5 nm    LP-IR Score** 50 (H) <=45   Basic Metabolic Panel    Specimen: Blood   Result Value Ref Range    Glucose 77 65 - 99 mg/dL    BUN 18 8 - 27 mg/dL    Creatinine 1.09 0.76 - " 1.27 mg/dL    eGFR Non African Am 65 >59 mL/min/1.73    eGFR African Am 75 >59 mL/min/1.73    BUN/Creatinine Ratio 17 10 - 24    Sodium 137 134 - 144 mmol/L    Potassium 5.1 3.5 - 5.2 mmol/L    Chloride 97 96 - 106 mmol/L    Total CO2 23 20 - 29 mmol/L    Calcium 9.8 8.6 - 10.2 mg/dL             The following portions of the patient's history were reviewed and updated as appropriate: allergies, current medications, past family history, past medical history, past social history, past surgical history, and problem list.    Review of Systems   Constitutional: Negative for activity change, chills and fever.   Respiratory: Negative for cough.    Cardiovascular: Negative for chest pain.   Psychiatric/Behavioral: Negative for dysphoric mood.       Objective   Physical Exam  Constitutional:       General: He is not in acute distress.     Appearance: He is well-developed.   Cardiovascular:      Rate and Rhythm: Normal rate and regular rhythm.   Pulmonary:      Effort: Pulmonary effort is normal.      Breath sounds: Normal breath sounds.   Neurological:      Mental Status: He is alert and oriented to person, place, and time.   Psychiatric:         Behavior: Behavior normal.         Thought Content: Thought content normal.     Labs reviewed with pt today during visit. All questions answered.        Assessment/Plan   Diagnoses and all orders for this visit:    1. Coronary artery disease due to lipid rich plaque (Primary)  -     ramipril (ALTACE) 5 MG capsule; Take 1 capsule by mouth Daily.  Dispense: 90 capsule; Refill: 1  -     Comprehensive Metabolic Panel; Future  -     Lipoprotein NMR; Future  -     TSH; Future  -     CBC & Differential; Future    2. Gastroesophageal reflux disease without esophagitis  -     pantoprazole (PROTONIX) 40 MG EC tablet; Take 1 tablet by mouth Daily.  Dispense: 90 tablet; Refill: 1    3. Hyperlipidemia, unspecified hyperlipidemia type  -     rosuvastatin (Crestor) 20 MG tablet; Take 1 tablet by  mouth Daily.  Dispense: 90 tablet; Refill: 1  -     Lipoprotein NMR; Future    4. Screening for prostate cancer  -     PSA Screen; Future    5. Benign essential hypertension  -     ramipril (ALTACE) 5 MG capsule; Take 1 capsule by mouth Daily.  Dispense: 90 capsule; Refill: 1  -     Comprehensive Metabolic Panel; Future  -     TSH; Future  -     CBC & Differential; Future

## 2022-02-14 ENCOUNTER — OFFICE VISIT (OUTPATIENT)
Dept: FAMILY MEDICINE CLINIC | Facility: CLINIC | Age: 77
End: 2022-02-14

## 2022-02-14 VITALS
RESPIRATION RATE: 16 BRPM | DIASTOLIC BLOOD PRESSURE: 75 MMHG | HEIGHT: 68 IN | WEIGHT: 175 LBS | SYSTOLIC BLOOD PRESSURE: 128 MMHG | HEART RATE: 65 BPM | BODY MASS INDEX: 26.52 KG/M2 | OXYGEN SATURATION: 98 % | TEMPERATURE: 97.8 F

## 2022-02-14 DIAGNOSIS — K21.9 GASTROESOPHAGEAL REFLUX DISEASE WITHOUT ESOPHAGITIS: Chronic | ICD-10-CM

## 2022-02-14 DIAGNOSIS — I10 BENIGN ESSENTIAL HYPERTENSION: Chronic | ICD-10-CM

## 2022-02-14 DIAGNOSIS — E78.5 HYPERLIPIDEMIA, UNSPECIFIED HYPERLIPIDEMIA TYPE: Chronic | ICD-10-CM

## 2022-02-14 DIAGNOSIS — I25.83 CORONARY ARTERY DISEASE DUE TO LIPID RICH PLAQUE: Primary | Chronic | ICD-10-CM

## 2022-02-14 DIAGNOSIS — I25.10 CORONARY ARTERY DISEASE DUE TO LIPID RICH PLAQUE: Primary | Chronic | ICD-10-CM

## 2022-02-14 DIAGNOSIS — Z12.5 SCREENING FOR PROSTATE CANCER: ICD-10-CM

## 2022-02-14 PROCEDURE — 99214 OFFICE O/P EST MOD 30 MIN: CPT | Performed by: FAMILY MEDICINE

## 2022-02-14 RX ORDER — PANTOPRAZOLE SODIUM 40 MG/1
40 TABLET, DELAYED RELEASE ORAL DAILY
Qty: 90 TABLET | Refills: 1 | Status: SHIPPED | OUTPATIENT
Start: 2022-02-14 | End: 2022-08-10 | Stop reason: SDUPTHER

## 2022-02-14 RX ORDER — ROSUVASTATIN CALCIUM 20 MG/1
20 TABLET, COATED ORAL DAILY
Qty: 90 TABLET | Refills: 1 | Status: SHIPPED | OUTPATIENT
Start: 2022-02-14 | End: 2022-08-10 | Stop reason: SDUPTHER

## 2022-02-14 RX ORDER — RAMIPRIL 5 MG/1
5 CAPSULE ORAL DAILY
Qty: 90 CAPSULE | Refills: 1 | Status: SHIPPED | OUTPATIENT
Start: 2022-02-14 | End: 2022-08-10 | Stop reason: SDUPTHER

## 2022-03-01 DIAGNOSIS — G89.29 CHRONIC PAIN OF BOTH HIPS: ICD-10-CM

## 2022-03-01 DIAGNOSIS — M25.552 CHRONIC PAIN OF BOTH HIPS: ICD-10-CM

## 2022-03-01 DIAGNOSIS — M54.50 CHRONIC BILATERAL LOW BACK PAIN WITHOUT SCIATICA: ICD-10-CM

## 2022-03-01 DIAGNOSIS — G89.29 CHRONIC BILATERAL LOW BACK PAIN WITHOUT SCIATICA: ICD-10-CM

## 2022-03-01 DIAGNOSIS — M25.551 CHRONIC PAIN OF BOTH HIPS: ICD-10-CM

## 2022-03-01 RX ORDER — HYDROCODONE BITARTRATE AND ACETAMINOPHEN 10; 325 MG/1; MG/1
1 TABLET ORAL EVERY 6 HOURS PRN
Qty: 60 TABLET | Refills: 0 | OUTPATIENT
Start: 2022-03-01

## 2022-03-01 NOTE — TELEPHONE ENCOUNTER
Medication Refill Request    Date of phone call: 03/01/2022    Medication being requested: Hydrocodone  mg sig: Take 1 tablet by mouth Every 6 (Six) Hours As Needed for Moderate Pain   Qty: 60    Date of last visit: 01/28/2022    Date of last refill:     RUTH up to date?:     Next Follow up?: 03/28/2022    Any new pertinent information? (i.e, new medication allergies, new use of medications, change in patient's health or condition, non-compliance or inconsistency with prescribing agreement?): can you please fill for Christy? Thanks LBS.

## 2022-03-01 NOTE — TELEPHONE ENCOUNTER
Prescription 2 of 2.  Fill date 3/7/2022       Sig: Take 1 tablet by mouth Every 6 (Six) Hours As Needed for Moderate Pain . Take at least 6 hours apart. Max 2/day.    Prescription should be at pharmacy

## 2022-03-22 ENCOUNTER — TELEPHONE (OUTPATIENT)
Dept: FAMILY MEDICINE CLINIC | Facility: CLINIC | Age: 77
End: 2022-03-22

## 2022-03-22 NOTE — TELEPHONE ENCOUNTER
Left message for patient to  eliquis samples (5mg) at the . Pt also told we do not have any samples of mybetriq 25mgs .

## 2022-03-22 NOTE — TELEPHONE ENCOUNTER
Caller: Papito Santoro    Relationship: Self    Best call back number: [phone] (H)    What is the best time to reach you: ANYTIME, ASAP    Who are you requesting to speak with (clinical staff, provider,  specific staff member): CLINICAL STAFF    Do you know the name of the person who called: PAPITO SANTORO    What was the call regarding: PATIENT IS REQUESTING SAMPLES OF apixaban (ELIQUIS) 5 MG tablet tablet AND Myrbetriq 25 MG tablet sustained-release 24 hour 24 hr tablet BECAUSE HE IS COMPLETELY OUT OF THESE MEDICATIONS AND CANNOT AFFORD THEM BECAUSE HE IS IN THE DONUT HOLE WITH HIS INSURANCE RIGHT NOW, PLEASE ADVISE ASAP    Do you require a callback: YES, ASAP

## 2022-03-28 ENCOUNTER — OFFICE VISIT (OUTPATIENT)
Dept: PAIN MEDICINE | Facility: CLINIC | Age: 77
End: 2022-03-28

## 2022-03-28 VITALS
WEIGHT: 174.8 LBS | OXYGEN SATURATION: 98 % | DIASTOLIC BLOOD PRESSURE: 70 MMHG | SYSTOLIC BLOOD PRESSURE: 160 MMHG | HEIGHT: 68 IN | HEART RATE: 64 BPM | RESPIRATION RATE: 12 BRPM | BODY MASS INDEX: 26.49 KG/M2 | TEMPERATURE: 96.9 F

## 2022-03-28 DIAGNOSIS — M25.50 ARTHRALGIA OF MULTIPLE JOINTS: ICD-10-CM

## 2022-03-28 DIAGNOSIS — Z79.899 ENCOUNTER FOR LONG-TERM (CURRENT) USE OF HIGH-RISK MEDICATION: ICD-10-CM

## 2022-03-28 DIAGNOSIS — M47.816 LUMBAR FACET ARTHROPATHY: ICD-10-CM

## 2022-03-28 DIAGNOSIS — M54.50 CHRONIC BILATERAL LOW BACK PAIN WITHOUT SCIATICA: ICD-10-CM

## 2022-03-28 DIAGNOSIS — G89.29 CHRONIC PAIN OF BOTH HIPS: ICD-10-CM

## 2022-03-28 DIAGNOSIS — G89.29 CHRONIC BILATERAL LOW BACK PAIN WITHOUT SCIATICA: ICD-10-CM

## 2022-03-28 DIAGNOSIS — M54.50 CHRONIC BILATERAL LOW BACK PAIN WITHOUT SCIATICA: Primary | ICD-10-CM

## 2022-03-28 DIAGNOSIS — G89.29 CHRONIC BILATERAL LOW BACK PAIN WITHOUT SCIATICA: Primary | ICD-10-CM

## 2022-03-28 DIAGNOSIS — M25.551 CHRONIC PAIN OF BOTH HIPS: ICD-10-CM

## 2022-03-28 DIAGNOSIS — M25.552 CHRONIC PAIN OF BOTH HIPS: ICD-10-CM

## 2022-03-28 PROCEDURE — 80305 DRUG TEST PRSMV DIR OPT OBS: CPT | Performed by: NURSE PRACTITIONER

## 2022-03-28 PROCEDURE — 99214 OFFICE O/P EST MOD 30 MIN: CPT | Performed by: NURSE PRACTITIONER

## 2022-03-28 RX ORDER — HYDROCODONE BITARTRATE AND ACETAMINOPHEN 10; 325 MG/1; MG/1
1 TABLET ORAL EVERY 6 HOURS PRN
Qty: 60 TABLET | Refills: 0 | Status: SHIPPED | OUTPATIENT
Start: 2022-05-08 | End: 2022-06-06

## 2022-03-28 RX ORDER — HYDROCODONE BITARTRATE AND ACETAMINOPHEN 10; 325 MG/1; MG/1
1 TABLET ORAL EVERY 6 HOURS PRN
Qty: 60 TABLET | Refills: 0 | Status: SHIPPED | OUTPATIENT
Start: 2022-04-08 | End: 2022-06-06 | Stop reason: SDUPTHER

## 2022-03-28 NOTE — TELEPHONE ENCOUNTER
Seen  Today. DNF 4/8/2022 applied to first prescription, 5/8/2022 to second prescription. Thanks, TT

## 2022-03-28 NOTE — PROGRESS NOTES
"CHIEF COMPLAINT  FOLLOW UP BACK PAIN   2 Month evaluation- Patient reports his pain has continued to fluctuate since last O/V.       Subjective   Madhu Santoro is a 76 y.o. male  who presents for follow-up.  He has a history of back and joint pain.  Today his pain is 8/10VAS in severity.  He continues with Norco 10/325 2/day. This medication regimen decreases his pain. \"If I didn't have it I'd be down on my hands and knees\".  ADLs by self. He denies any side effects including somnolence or constipation.     He underwent a Mohs procedure on his nose ~3 weeks ago. States he had 27 stitches in his nose.     He has no interest in procedures due to a friend having developing foot drop following an injection which was administered for back pain.      No improvement with PT.      Not a candidate for NSAIDs--on Eliquis.     Patient remained masked during entire encounter. No cough present. I donned a mask and eye protection throughout entire visit. Prior to donning mask and eye protection, hand hygiene was performed, as well as when it was doffed.  I was closer than 6 feet, but not for an extended period of time. No obvious exposure to any bodily fluids.    Back Pain  This is a chronic problem. The current episode started more than 1 year ago. The problem occurs constantly. The problem is unchanged. The pain is present in the lumbar spine (across his belt line). The quality of the pain is described as aching. Radiates to: whole leg aching pain bilaterally with standing and walking. The pain is at a severity of 8/10. The symptoms are aggravated by standing, sitting and position (walking). Stiffness is present all day. Associated symptoms include numbness (toes) and weakness (bilateral legs). Pertinent negatives include no abdominal pain, chest pain, dysuria, fever or headaches. He has tried analgesics, heat and home exercises (Norco 10/325) for the symptoms.   Joint Pain  This is a chronic problem. The current episode " started more than 1 year ago. The problem occurs constantly. Progression since onset: unchanged since last office visit. Associated symptoms include arthralgias, numbness (toes) and weakness (bilateral legs). Pertinent negatives include no abdominal pain, chest pain, congestion, coughing, fatigue, fever or headaches. The symptoms are aggravated by standing and walking (ROM). He has tried heat (Norco 10/325, steroid injections) for the symptoms.      PEG Assessment   What number best describes your pain on average in the past week?9  What number best describes how, during the past week, pain has interfered with your enjoyment of life?9  What number best describes how, during the past week, pain has interfered with your general activity?  9    The following portions of the patient's history were reviewed and updated as appropriate: allergies, current medications, past family history, past medical history, past social history, past surgical history and problem list.    Review of Systems   Constitutional: Negative for activity change, fatigue and fever.   HENT: Negative for congestion.    Eyes: Negative for visual disturbance.   Respiratory: Negative for cough and chest tightness.    Cardiovascular: Negative for chest pain.   Gastrointestinal: Negative for abdominal pain, constipation and diarrhea.   Genitourinary: Negative for difficulty urinating and dysuria.   Musculoskeletal: Positive for arthralgias and back pain.   Neurological: Positive for weakness (bilateral legs) and numbness (toes). Negative for dizziness, light-headedness and headaches.   Psychiatric/Behavioral: Negative for agitation, sleep disturbance and suicidal ideas. The patient is not nervous/anxious.      --  The aforementioned information the Chief Complaint section and above subjective data including any HPI data, and also the Review of Systems data, has been personally reviewed and affirmed.  --    Vitals:    03/28/22 1053   BP: 160/70   BP  "Location: Left arm   Patient Position: Sitting   Pulse: 64   Resp: 12   Temp: 96.9 °F (36.1 °C)   SpO2: 98%   Weight: 79.3 kg (174 lb 12.8 oz)   Height: 172.7 cm (68\")   PainSc:   8   PainLoc: Back     Objective   Physical Exam  Vitals and nursing note reviewed.   Constitutional:       Appearance: Normal appearance. He is well-developed.   Eyes:      General: Lids are normal.   Cardiovascular:      Rate and Rhythm: Normal rate.   Pulmonary:      Effort: Pulmonary effort is normal.   Musculoskeletal:      Lumbar back: Tenderness and bony tenderness present. Decreased range of motion.      Comments: Diffuse arthritic changes   Neurological:      Mental Status: He is alert and oriented to person, place, and time.   Psychiatric:         Attention and Perception: Attention normal.         Mood and Affect: Mood normal.         Speech: Speech normal.         Behavior: Behavior normal.         Judgment: Judgment normal.       Assessment/Plan   Diagnoses and all orders for this visit:    1. Chronic bilateral low back pain without sciatica (Primary)    2. Chronic pain of both hips    3. Encounter for long-term (current) use of high-risk medication    4. Arthralgia of multiple joints    5. Lumbar facet arthropathy      --- Routine UDS in office today as part of monitoring requirements for controlled substances.  The specimen was viewed and the immunoassay result reviewed and is +OPI.  This specimen will be sent to [facility] laboratory for confirmation.     --- CSA updated 3/3/2021  --- Refill Center 10/325. DNF 4/8/2022 applied. Patient appears stable with current regimen. No adverse effects. Regarding continuation of opioids, there is no evidence of aberrant behavior or any red flags.  The patient continues with appropriate response to opioid therapy. ADL's remain intact by self.   --- Discussed consideration of Gabapentin or Lyrica, he states \"I take enough medication as is\".   --- Recommend utilizing extra strength tylenol. "   --- Follow-up 2 months or sooner if needed.      RUTH REPORT  As part of the patient's treatment plan, I am prescribing controlled substances. The patient has been made aware of appropriate use of such medications, including potential risk of somnolence, limited ability to drive and/or work safely, and the potential for dependence or overdose. It has also bee made clear that these medications are for use by this patient only, without concomitant use of alcohol or other substances unless prescribed.     Patient has completed prescribing agreement detailing terms of continued prescribing of controlled substances, including monitoring RUTH reports, urine drug screening, and pill counts if necessary. The patient is aware that inappropriate use will results in cessation of prescribing such medications.    As the clinician, I personally reviewed the RUTH from 3/28/2022 while the patient was in the office today.    History and physical exam exhibit continued safe and appropriate use of controlled substances.    Dictated utilizing Dragon dictation.     This document is intended for medical expert use only. Reading of this document by patients and/or patient's family without participating medical staff guidance may result in misinterpretation and unintended morbidity.   Any interpretation of such data is the responsibility of the patient and/or family member responsible for the patient in concert with their primary or specialist providers, not to be left for sources of online searches such as [employer], [employer] or similar queries. Relying on these approaches to knowledge may result in misinterpretation, misguided goals of care and even death should patients or family members try recommendations outside of the realm of professional medical care in a supervised way.

## 2022-04-12 ENCOUNTER — TELEPHONE (OUTPATIENT)
Dept: FAMILY MEDICINE CLINIC | Facility: CLINIC | Age: 77
End: 2022-04-12

## 2022-04-12 NOTE — TELEPHONE ENCOUNTER
THE PATIENT WOULD LIKE TO KNOW IF THE OFFICE HAS SAMPLES OF THE MEDICATIONS apixaban (ELIQUIS) 5 MG tablet AND Myrbetriq 25 MG tablet sustained-release 24 hour tablet.  PLEASE ADVISE BY CALLING [phone].

## 2022-05-03 ENCOUNTER — TELEPHONE (OUTPATIENT)
Dept: FAMILY MEDICINE CLINIC | Facility: CLINIC | Age: 77
End: 2022-05-03

## 2022-05-03 RX ORDER — HYDROCODONE BITARTRATE AND ACETAMINOPHEN 10; 325 MG/1; MG/1
1 TABLET ORAL EVERY 6 HOURS PRN
Qty: 60 TABLET | Refills: 0 | OUTPATIENT
Start: 2022-05-08

## 2022-05-03 NOTE — TELEPHONE ENCOUNTER
Per pharmacy they do have script  with fill date of 05/08/2022 I have informed the patient as well . THANKS SAL .

## 2022-05-03 NOTE — TELEPHONE ENCOUNTER
Medication Refill Request    Date of phone call: 05/03/2022    Medication being requested: NORCO  MG sig: Take 1 tablet by mouth Every 6 (Six) Hours As Needed for Moderate Pain .  Qty: 60    Date of last visit: 03/28/2022    Date of last refill:     RUTH up to date?:     Next Follow up?: 05/202/2022    Any new pertinent information? (i.e, new medication allergies, new use of medications, change in patient's health or condition, non-compliance or inconsistency with prescribing agreement?):

## 2022-05-03 NOTE — TELEPHONE ENCOUNTER
Caller: Madhu Santoro    Relationship: Self    Best call back number: [phone]    What is the best time to reach you: ANY TIME    Who are you requesting to speak with (clinical staff, provider,  specific staff member): COURT OR GIRISH    What was the call regarding: PATIENT WANTS TO KNOW IF WE HAVE ANY apixaban (ELIQUIS) 5 MG tablet tablet AND Myrbetriq 25 MG tablet sustained-release 24 hour 24 hr tablet THAT HE COULD COME  FROM THE OFFICE.    PLEASE ADVISE.    Do you require a callback: YES

## 2022-05-23 ENCOUNTER — TELEPHONE (OUTPATIENT)
Dept: PAIN MEDICINE | Facility: CLINIC | Age: 77
End: 2022-05-23

## 2022-05-23 NOTE — TELEPHONE ENCOUNTER
Caller: PAPITO     Relationship to patient: SELF    Best call back number: [phone] OR [phone]    Chief complaint: MEDICATION    Type of visit: FOLLOW UP    Requested date: NEXT AVAIL     If rescheduling, when is the original appointment: 5/20/22     Additional notes: PT CALLED IN TO RESCHEDULE FU APPT , HE NEEDS APPT TO FOLLOW UP FOR MEDICATION. NOT PULLING ANY APPTS UP UNTIL 6/10 , PLEASE CALL TO SCHEDULE SOONER.

## 2022-06-06 ENCOUNTER — PRIOR AUTHORIZATION (OUTPATIENT)
Dept: PAIN MEDICINE | Facility: CLINIC | Age: 77
End: 2022-06-06

## 2022-06-06 ENCOUNTER — OFFICE VISIT (OUTPATIENT)
Dept: PAIN MEDICINE | Facility: CLINIC | Age: 77
End: 2022-06-06

## 2022-06-06 VITALS
DIASTOLIC BLOOD PRESSURE: 71 MMHG | BODY MASS INDEX: 25.76 KG/M2 | SYSTOLIC BLOOD PRESSURE: 157 MMHG | TEMPERATURE: 97.1 F | WEIGHT: 170 LBS | HEIGHT: 68 IN | OXYGEN SATURATION: 99 % | RESPIRATION RATE: 20 BRPM | HEART RATE: 64 BPM

## 2022-06-06 DIAGNOSIS — M25.551 CHRONIC PAIN OF BOTH HIPS: ICD-10-CM

## 2022-06-06 DIAGNOSIS — M47.816 LUMBAR FACET ARTHROPATHY: ICD-10-CM

## 2022-06-06 DIAGNOSIS — Z79.899 ENCOUNTER FOR LONG-TERM (CURRENT) USE OF HIGH-RISK MEDICATION: ICD-10-CM

## 2022-06-06 DIAGNOSIS — M25.552 CHRONIC PAIN OF BOTH HIPS: ICD-10-CM

## 2022-06-06 DIAGNOSIS — G89.29 CHRONIC BILATERAL LOW BACK PAIN WITHOUT SCIATICA: Primary | ICD-10-CM

## 2022-06-06 DIAGNOSIS — M25.50 ARTHRALGIA OF MULTIPLE JOINTS: ICD-10-CM

## 2022-06-06 DIAGNOSIS — G89.29 CHRONIC PAIN OF BOTH HIPS: ICD-10-CM

## 2022-06-06 DIAGNOSIS — M54.50 CHRONIC BILATERAL LOW BACK PAIN WITHOUT SCIATICA: Primary | ICD-10-CM

## 2022-06-06 PROCEDURE — 99214 OFFICE O/P EST MOD 30 MIN: CPT | Performed by: NURSE PRACTITIONER

## 2022-06-06 RX ORDER — HYDROCODONE BITARTRATE AND ACETAMINOPHEN 10; 325 MG/1; MG/1
1 TABLET ORAL EVERY 6 HOURS PRN
Qty: 60 TABLET | Refills: 0 | Status: SHIPPED | OUTPATIENT
Start: 2022-06-06 | End: 2022-07-06 | Stop reason: SDUPTHER

## 2022-06-06 NOTE — TELEPHONE ENCOUNTER
Started PA for RELISTOR 150 MG waiting on approval ,patient verbally voiced understanding and will keep him updated  LBS

## 2022-06-06 NOTE — PROGRESS NOTES
CHIEF COMPLAINT  F/u back and joint pain. Pt sts pain is the same.    Subjective   Madhu Santoro is a 76 y.o. male  who presents for follow-up.  He has a history of low back and joint pain.    Today pain is 7/10VAS in severity. Continues with Norco 10mg BID and uses OTC Tylenol PRN. The regimen helps decrease his pain by a moderate amount. Notes improvement in activity and function with regimen. He reports constipation. Takes  Ex-Lax 1 tablet in the morning but occasionally takes up to 3/day, denies other side effects including somnolence. ADL's by self. Denies any bowel or bladder changes.     Continues to have no interest in procedures due to a friend having developing foot drop following an injection which was administered for back pain.      No improvement with PT.      Not a candidate for NSAIDs--on Eliquis.     Patient remained masked during entire encounter. No cough present. I donned a mask and eye protection throughout entire visit. Prior to donning mask and eye protection, hand hygiene was performed, as well as when it was doffed.  I was closer than 6 feet, but not for an extended period of time. No obvious exposure to any bodily fluids.    Back Pain  This is a chronic problem. The current episode started more than 1 year ago. The problem occurs constantly. The problem is unchanged. The pain is present in the lumbar spine (across his belt line). The quality of the pain is described as aching. Radiates to: whole leg aching pain bilaterally with standing and walking. The pain is at a severity of 7/10. The pain is the same all the time. The symptoms are aggravated by standing, sitting and position (walking). Stiffness is present all day. Associated symptoms include numbness (lower legs/toes) and tingling. Pertinent negatives include no abdominal pain, chest pain, dysuria, fever, headaches or weakness. He has tried analgesics, heat and home exercises (Norco 10/325) for the symptoms. The treatment provided  mild relief.   Joint Pain  This is a chronic problem. The current episode started more than 1 year ago. The problem occurs constantly. Progression since onset: unchanged since last office visit. Associated symptoms include arthralgias (joint) and numbness (lower legs/toes). Pertinent negatives include no abdominal pain, chest pain, congestion, coughing, fatigue, fever, headaches or weakness. The symptoms are aggravated by standing and walking (ROM). He has tried heat (Norco 10/325, steroid injections) for the symptoms.      PEG Assessment   What number best describes your pain on average in the past week?6  What number best describes how, during the past week, pain has interfered with your enjoyment of life?6  What number best describes how, during the past week, pain has interfered with your general activity?  6    The following portions of the patient's history were reviewed and updated as appropriate: allergies, current medications, past family history, past medical history, past social history, past surgical history and problem list.    Review of Systems   Constitutional: Negative for activity change, fatigue and fever.   HENT: Negative for congestion.    Eyes: Negative for visual disturbance.   Respiratory: Negative for cough and shortness of breath.    Cardiovascular: Negative for chest pain.   Gastrointestinal: Negative for abdominal pain, constipation and diarrhea.   Genitourinary: Negative for difficulty urinating and dysuria.   Musculoskeletal: Positive for arthralgias (joint) and back pain.   Neurological: Positive for tingling and numbness (lower legs/toes). Negative for dizziness, weakness, light-headedness and headaches.   Psychiatric/Behavioral: Negative for agitation, sleep disturbance and suicidal ideas. The patient is not nervous/anxious.      --  The aforementioned information the Chief Complaint section and above subjective data including any HPI data, and also the Review of Systems data, has  "been personally reviewed and affirmed.  --     Vitals:    06/06/22 0856   BP: 157/71  Comment: pt sts had bp meds today   Pulse: 64   Resp: 20   Temp: 97.1 °F (36.2 °C)   SpO2: 99%   Weight: 77.1 kg (170 lb)   Height: 172.7 cm (68\")   PainSc:   7   PainLoc: Back  Comment: and joint     Objective   Physical Exam  Vitals and nursing note reviewed.   Constitutional:       Appearance: Normal appearance. He is well-developed.   Eyes:      General: Lids are normal.   Cardiovascular:      Rate and Rhythm: Normal rate.   Pulmonary:      Effort: Pulmonary effort is normal.   Musculoskeletal:      Lumbar back: Tenderness and bony tenderness present. Decreased range of motion.      Comments: Diffuse arthritic changes   Neurological:      Mental Status: He is alert and oriented to person, place, and time.   Psychiatric:         Attention and Perception: Attention normal.         Mood and Affect: Mood normal.         Speech: Speech normal.         Behavior: Behavior normal.         Judgment: Judgment normal.       Assessment & Plan   Diagnoses and all orders for this visit:    1. Chronic bilateral low back pain without sciatica (Primary)  -     HYDROcodone-acetaminophen (NORCO)  MG per tablet; Take 1 tablet by mouth Every 6 (Six) Hours As Needed for Moderate Pain . Take at least 6 hours apart. Max 2/day. 30 day supply  Dispense: 60 tablet; Refill: 0    2. Chronic pain of both hips  -     HYDROcodone-acetaminophen (NORCO)  MG per tablet; Take 1 tablet by mouth Every 6 (Six) Hours As Needed for Moderate Pain . Take at least 6 hours apart. Max 2/day. 30 day supply  Dispense: 60 tablet; Refill: 0    3. Encounter for long-term (current) use of high-risk medication    4. Arthralgia of multiple joints    5. Lumbar facet arthropathy    Other orders  -     Methylnaltrexone Bromide 150 MG tablet; Take 450 mg by mouth Daily.  Dispense: 90 tablet; Refill: 0      --- The urine drug screen confirmation from 3/28/22 has been " reviewed and the result is appropriate based on patient history and RUTH report.  ---The patient signed an updated copy of the controlled substance agreement on 6/6/22.  --- Refill [place] 10/325. DNF 6/8/2022 applied. Patient appears stable with current regimen. No adverse effects. Regarding continuation of opioids, there is no evidence of aberrant behavior or any red flags.  The patient continues with appropriate response to opioid therapy. ADL's remain intact by self.   --- Trial Relistor, samples provided in office today.  Patient instructed to hold the ex-lax that he is currently using when he starts the relistor.  Discussed medication with the patient.  Included in this discussion was the potential for side effects and adverse events.  Patient verbalized understanding and wished to proceed.  Prescription will be sent to pharmacy.  --- Follow-up 2 months or sooner if needed.      RUTH REPORT  As part of the patient's treatment plan, I am prescribing controlled substances. The patient has been made aware of appropriate use of such medications, including potential risk of somnolence, limited ability to drive and/or work safely, and the potential for dependence or overdose. It has also bee made clear that these medications are for use by this patient only, without concomitant use of alcohol or other substances unless prescribed.     Patient has completed prescribing agreement detailing terms of continued prescribing of controlled substances, including monitoring RUTH reports, urine drug screening, and pill counts if necessary. The patient is aware that inappropriate use will results in cessation of prescribing such medications.    As the clinician, I personally reviewed the RUTH from 6/6/22 while the patient was in the office today.    History and physical exam exhibit continued safe and appropriate use of controlled substances.    Dictated utilizing Dragon dictation.     This document is intended for  medical expert use only. Reading of this document by patients and/or patient's family without participating medical staff guidance may result in misinterpretation and unintended morbidity.   Any interpretation of such data is the responsibility of the patient and/or family member responsible for the patient in concert with their primary or specialist providers, not to be left for sources of online searches such as [employer], [employer] or similar queries. Relying on these approaches to knowledge may result in misinterpretation, misguided goals of care and even death should patients or family members try recommendations outside of the realm of professional medical care in a supervised way.

## 2022-06-10 ENCOUNTER — TELEPHONE (OUTPATIENT)
Dept: PAIN MEDICINE | Facility: CLINIC | Age: 77
End: 2022-06-10

## 2022-06-10 RX ORDER — NALOXEGOL OXALATE 25 MG/1
25 TABLET, FILM COATED ORAL EVERY MORNING
Qty: 30 TABLET | Refills: 0 | Status: SHIPPED | OUTPATIENT
Start: 2022-06-10 | End: 2022-08-24 | Stop reason: HOSPADM

## 2022-06-10 NOTE — TELEPHONE ENCOUNTER
Please notify patient that relistor was not approved by his insurance. I have sent Movantik to his pharmacy which is in the same class of medication. Thanks, TT

## 2022-06-13 NOTE — TELEPHONE ENCOUNTER
Caller: PATIENT    Relationship to patient: SELF     Best call back number: [phone]    Patient is needing: PATIENT WAS RETURNING CALL FROM [place]. PATIENT STATED HE DID NOT RECEIVE A MESSAGE SO I ATTEMPTED TO WARM TRANSFER CALL TO OFFICE BUT RECEIVED NO ANSWER. THANK YOU!

## 2022-06-16 ENCOUNTER — OFFICE VISIT (OUTPATIENT)
Dept: CARDIOLOGY | Facility: CLINIC | Age: 77
End: 2022-06-16

## 2022-06-16 VITALS
WEIGHT: 168.6 LBS | HEART RATE: 63 BPM | HEIGHT: 68 IN | SYSTOLIC BLOOD PRESSURE: 128 MMHG | OXYGEN SATURATION: 99 % | DIASTOLIC BLOOD PRESSURE: 64 MMHG | BODY MASS INDEX: 25.55 KG/M2

## 2022-06-16 DIAGNOSIS — Z98.890 STATUS POST ABLATION OF ATRIAL FLUTTER: ICD-10-CM

## 2022-06-16 DIAGNOSIS — I63.9 CEREBROVASCULAR ACCIDENT (CVA), UNSPECIFIED MECHANISM: ICD-10-CM

## 2022-06-16 DIAGNOSIS — I25.810 CORONARY ARTERY DISEASE INVOLVING CORONARY BYPASS GRAFT OF NATIVE HEART WITHOUT ANGINA PECTORIS: Primary | ICD-10-CM

## 2022-06-16 DIAGNOSIS — I77.9 CAROTID ARTERY DISEASE, UNSPECIFIED LATERALITY, UNSPECIFIED TYPE: ICD-10-CM

## 2022-06-16 DIAGNOSIS — Z86.79 STATUS POST ABLATION OF ATRIAL FLUTTER: ICD-10-CM

## 2022-06-16 PROCEDURE — 99213 OFFICE O/P EST LOW 20 MIN: CPT | Performed by: INTERNAL MEDICINE

## 2022-07-05 NOTE — PROGRESS NOTES
Date of Office Visit: 2022  Encounter Provider: Eric Wright MD  Place of Service: [facility] CARDIOLOGY  Patient Name: Madhu Santoro  :1945    Chief complaint: Follow-up for coronary artery disease, atrial flutter status   post ablation, embolic CVA, and carotid artery disease.    History of Present Illness:      I again had the pleasure of seeing your patient in cardiology office on 2022.  As you   well know, he is a very pleasant 76 year-old white male with a past medical history   significant for coronary artery disease, prior carotid endarterectomy, and atrial flutter   status post ablation who presents for follow-up.  The patient formerly followed with Dr. Roger from the [facility].  He has a history of a carotid endarterectomy   in the past, and also has a history of a stroke for which he took Plavix for many years.       He presented to the [facility] emergency department on 2017 after   his primary care physician noted that he had a rapid heart rate of 150 bpm.  He was   found to be in typical atrial flutter at the time, which was a new diagnosis for him, although   he was completely asymptomatic with regards to the atrial flutter and was unaware that   he was in this.  He also had reported that he had experienced some chest pressure   radiating into his throat earlier in the week.  He was converted to sinus rhythm with an   amiodarone drip, and an echocardiogram on 2017 showed an ejection fraction of   55-60%, and no significant valvular disease.  He ultimately underwent a diagnostic left   heart catheterization on 4/10/2017 by Dr. Healy for presumed unstable angina and a   known history of coronary artery disease.  This showed severe coronary artery disease,   including a distal 80% severely calcified lesion in the left main extending into the ostium   of the left circumflex coronary artery.  He was then  referred for a coronary artery bypass   grafting operation, and underwent this by Dr. Cortez on 4/11/2017.  At that time, he had   a LIMA to LAD, SVG to OM1, and SVG to OM 3.  He did well postoperatively, but continued   to have significant issues with rapid atrial flutter which was largely unresponsive to   amiodarone at the time.  He ultimately underwent a cavotricuspid isthmus atrial flutter   ablation by Dr. Gustafson on 4/18/2017.  A preoperative AR performed the day prior to   this did not show any evidence for thrombus formation.  He did develop bradycardia on   even minimal doses of metoprolol as an outpatient, and this had to be discontinued.    The patient presented with confusion and speech abnormalities on 4/10/2021.  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    His carotid artery work-up was unrevealing.  It was felt that he had a high likelihood for   paroxysmal atrial fibrillation given his history of atrial flutter in the past.  He was already   on Plavix at the time.  He was subsequently placed on Eliquis, and his Plavix was   changed to aspirin.  He did have significant bruising issues while on dual antiplatelet   therapy in the past, and it was felt the Plavix and Eliquis would be higher risk than   aspirin and Eliquis.      The patient presents today for follow-up.  Overall, he has been doing fairly well.  He   does have more fatigue in general, although he has not had any chest pain or   shortness of breath.  He does occasionally wake up with palpitations and his heart   rate will be higher than normal, typically in the lower 100s.  His blood pressure today   is excellent at 128/64.  He is still taking the Eliquis without any difficulty.      Past Medical History:   Diagnosis Date   • Anxiety    • Arthritis    • Atrial flutter (HCC)     Status post cavotricuspid isthmus ablation by Dr. Gustafson on 4/18/17   • Mandel esophagus    • Benign  essential hypertension    • CAD (coronary artery disease)     3 vessel CABG 4/11/17 by Dr. Cortez: ROSARIO-prox LAD, SVG-OM1, SVG-OM3   • Carotid artery disease (HCC)     Status post carotid endarterectomy - USG 4/10/17: 50-59% NICHELLE, 1-15% LICA.    • Colonic polyp    • DDD (degenerative disc disease), lumbosacral    • GERD (gastroesophageal reflux disease)    • H/O bone density study 2013   • H/O complete eye exam 2014   • HLD (hyperlipidemia)    • Hypertension    • Lipid screening 05/31/2013   • Low back pain     [facility]   • Screening for prostate cancer 07/07/2015   • Stroke (HCC)        Past Surgical History:   Procedure Laterality Date   • CARDIAC CATHETERIZATION N/A 4/10/2017    Procedure: Left Heart Cath;  Surgeon: Marjorie Healy MD;  Location: [facility] CATH INVASIVE LOCATION;  Service:    • CARDIAC CATHETERIZATION N/A 4/10/2017    Procedure: Coronary angiography;  Surgeon: Marjorie Healy MD;  Location: [facility] CATH INVASIVE LOCATION;  Service:    • CARDIAC CATHETERIZATION N/A 4/10/2017    Procedure: Left ventriculography;  Surgeon: Marjorie Healy MD;  Location: [facility] CATH INVASIVE LOCATION;  Service:    • CARDIAC ELECTROPHYSIOLOGY PROCEDURE N/A 4/18/2017    Procedure: Ablation atrial flutter;  Surgeon: Jose Antonio Gustafson MD;  Location: [facility] CATH INVASIVE LOCATION;  Service:    • CHOLECYSTECTOMY     • CHOLECYSTECTOMY WITH INTRAOPERATIVE CHOLANGIOGRAM N/A 9/7/2019    Procedure: Laparoscopic cholecystectomy with intraoperative cholangiogram;  Surgeon: Gauri Travis MD;  Location: [facility] MAIN OR;  Service: General   • COLONOSCOPY  01/06/2015    Diverticulosis, one TA   • COLONOSCOPY N/A 2/14/2019    tics, NBIH, adenomatous polyp x 2   • COLONOSCOPY N/A 11/5/2021    Procedure: COLONOSCOPY TO CECUM AND TERM. ILEUM WITH COLD POLYPECTOMIES;  Surgeon: Everton Abel MD;  Location: [facility] ENDOSCOPY;  Service: Gastroenterology;  Laterality: N/A;  PRE OP - PERS H/O POLYPS  POST OP -  COLON POLYPS,, DIVERTICULOSIS, HEMORRHOIDS   • CORONARY ARTERY BYPASS GRAFT N/A 4/11/2017    Procedure: AR STERNOTOMY CORONARY ARTERY BYPASS GRAFT TIMES 3 USING LEFT INTERNAL MAMMARY ARTERY AND LEFT GREATER SAPHENOUS VEIN GRAFT PER ENDOSCOPIC VEIN HARVESTING AND PRP ;  Surgeon: Temo Cortez MD;  Location: [facility] MAIN OR;  Service:    • ENDOSCOPY  01/06/2015    HH, Ervin's esophagus   • ENDOSCOPY N/A 2/14/2019    Z line irregular, HH, Ervin's esophagus   • ENDOSCOPY N/A 11/5/2021    Procedure: ESOPHAGOGASTRODUODENOSCOPY WITH BIOPSIES;  Surgeon: Everton Abel MD;  Location: [facility] ENDOSCOPY;  Service: Gastroenterology;  Laterality: N/A;  PRE OP - PERS H/O ERVIN'S  POST OP - IRREG Z LINE   • VASECTOMY         Current Outpatient Medications on File Prior to Visit   Medication Sig Dispense Refill   • aluminum-magnesium hydroxide-simethicone (MAALOX MAX) 400-400-40 MG/5ML suspension Take 20 mL by mouth Every 6 (Six) Hours As Needed for Indigestion or Heartburn.     • amLODIPine (NORVASC) 2.5 MG tablet Take 1 tablet by mouth Daily. 30 tablet 11   • apixaban (ELIQUIS) 5 MG tablet tablet Take 1 tablet by mouth 2 (Two) Times a Day. Lot # [id number]  Exp 10/23 28 tablet 0   • aspirin (aspirin) 81 MG EC tablet Take 1 tablet by mouth Daily. 30 tablet 0   • HYDROcodone-acetaminophen (NORCO)  MG per tablet Take 1 tablet by mouth Every 6 (Six) Hours As Needed for Moderate Pain . Take at least 6 hours apart. Max 2/day. 30 day supply 60 tablet 0   • Myrbetriq 25 MG tablet sustained-release 24 hour 24 hr tablet Take 25 mg by mouth Daily.     • Naloxegol Oxalate (Movantik) 25 MG tablet Take 1 tablet by mouth Every Morning. 30 tablet 0   • nitroglycerin (NITROSTAT) 0.4 MG SL tablet Place 1 tablet under the tongue Every 5 (Five) Minutes As Needed for Chest Pain. Take no more than 3 doses in 15 minutes. 20 tablet 2   • pantoprazole (PROTONIX) 40 MG EC tablet Take 1 tablet by mouth Daily. 90 tablet 1   • ramipril  (ALTACE) 5 MG capsule Take 1 capsule by mouth Daily. 90 capsule 1   • rosuvastatin (Crestor) 20 MG tablet Take 1 tablet by mouth Daily. 90 tablet 1   • sucralfate (Carafate) 1 GM/10ML suspension Take 10 mL by mouth 2 (Two) Times a Day. 420 mL 1     No current facility-administered medications on file prior to visit.     Allergies as of 2022 - Reviewed 2022   Allergen Reaction Noted   • Atorvastatin Myalgia 2018   • Penicillins Hives, Swelling, and Rash 2013     Social History     Socioeconomic History   • Marital status:    Tobacco Use   • Smoking status: Former Smoker     Packs/day: 1.00     Types: Cigarettes     Start date: 1959     Quit date: 2009     Years since quittin.5   • Smokeless tobacco: Never Used   • Tobacco comment: CAFFEINE USE   Vaping Use   • Vaping Use: Never used   Substance and Sexual Activity   • Alcohol use: Yes     Comment: rarely   • Drug use: No   • Sexual activity: Yes     Partners: Female     Family History   Problem Relation Age of Onset   • Heart disease Mother    • Heart attack Mother    • Stroke Mother    • Heart disease Father    • Heart attack Father    • Stroke Father    • Arthritis Other    • Diabetes Other    • Hypertension Other    • Kidney disease Other         stones   • Hypertension Sister    • Heart attack Brother    • Heart disease Brother    • No Known Problems Maternal Grandmother    • No Known Problems Maternal Grandfather    • No Known Problems Paternal Grandmother    • No Known Problems Paternal Grandfather    • No Known Problems Brother    • Heart disease Brother    • Heart attack Brother    • Diabetes Brother    • Pancreatic cancer Paternal Cousin        Review of Systems   Cardiovascular: Positive for palpitations.   Hematologic/Lymphatic: Bruises/bleeds easily.   Musculoskeletal: Positive for joint pain.   All other systems reviewed and are negative.     Objective:     Vitals:    22 1127   BP: 128/64   Pulse: 63  "  SpO2: 99%   Weight: 76.5 kg (168 lb 9.6 oz)   Height: 172.7 cm (67.99\")     Body mass index is 25.64 kg/m².    Physical Exam  Constitutional:       Appearance: He is well-developed.   HENT:      Head: Normocephalic and atraumatic.   Eyes:      Conjunctiva/sclera: Conjunctivae normal.   Cardiovascular:      Rate and Rhythm: Normal rate and regular rhythm.      Heart sounds: No murmur heard.    No friction rub. No gallop.   Pulmonary:      Effort: Pulmonary effort is normal.      Breath sounds: Normal breath sounds.   Abdominal:      Palpations: Abdomen is soft.      Tenderness: There is no abdominal tenderness.   Musculoskeletal:      Cervical back: Neck supple.   Skin:     General: Skin is warm.   Neurological:      Mental Status: He is alert and oriented to person, place, and time.   Psychiatric:         Behavior: Behavior normal.       Lab Review:   Procedures    Cardiac Procedures:  1.  Echocardiogram on 4/8/2017: The ejection fraction was 55-60%.  The left atrium   was mildly to moderately dilated.  There was mild mitral regurgitation.  2.  Left heart catheterization on 4/10/2017: There was a distal 80% and severely   calcified stenosis in the left main extending into the ostium of left circumflex.  The left   circumflex was dominant, and did have the before mentioned 80% lesion in the ostial   aspect extending from the left main.  The LAD had 20% proximal disease.  The right   coronary artery was small, nondominant, and normal.  3.  Status post 3 vessel coronary artery bypass grafting on 4/11/2017 by Dr. Cortez:   LIMA to proximal LAD, SVG to OM 1, and SVG to OM 3.  4.  Transesophageal echo on 4/17/2017: The ejection fraction was 55%.  There was   moderate left atrial enlargement and mild to moderate right atrial enlargement.  The   atrial septum was redundant, but no PFO was seen on the saline study.  There was   no significant valvular disease.  5.  Status post cavotricuspid isthmus ablation for typical " atrial flutter on 4/18/2017 by Dr. Gustafson.  6.  Carotid Doppler ultrasound on 4/10/2017: There was 50-59% stenosis in the right   internal carotid artery.  There was 1-15% disease in the left internal carotid artery.  7.  Echocardiogram on 10/24/2017: Ejection fraction was 56%.  There was grade 2   diastolic dysfunction.  There was aortic sclerosis without stenosis.  8.  Echocardiogram on 4/10/2021: The ejection fraction was 57%.  The right ventricle   was mildly enlarged.  There was trace mitral regurgitation.   9.  CT angiogram of the head and neck on 4/12/2021: Calcified plaque at both carotid   bifurcations without evidence of stenosis.    Assessment:       Diagnosis Plan   1. Coronary artery disease involving coronary bypass graft of native heart without angina pectoris     2. Status post ablation of atrial flutter     3. Cerebrovascular accident (CVA), unspecified mechanism (HCC)     4. 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  I placed him on Eliquis at that time, and changed his Plavix to aspirin 81 mg/day.  I did feel that he had a high likelihood of potentially having atrial fibrillation given that he has had an atrial flutter ablation in the past, along with his age and cardiac history.  He does have some bruising, although he has not had major bleeding issues.  He did have significant bruising with dual antiplatelet therapy in the past.    His blood pressure is good today at 128/64.  It has been somewhat difficult to control in the past at times.  He is currently on amlodipine 2.5 mg/day and Altace 5 mg/day.  He cannot take beta-blockers secondary to significant bradycardia even with minimal doses in the past.    He will continue on the Crestor 20 mg/day.  His latest lipid panel from 2/8/2022 showed a total cholesterol of 128, HDL 47, LDL 66, and triglycerides 75..  He is followed by Dr. Mayorga in vascular  surgery for his history of carotid endarterectomy.  He also had less than 70% disease in his celiac artery.    I will have him follow-up in 6 months unless other issues arise.

## 2022-07-06 DIAGNOSIS — G89.29 CHRONIC BILATERAL LOW BACK PAIN WITHOUT SCIATICA: ICD-10-CM

## 2022-07-06 DIAGNOSIS — M54.50 CHRONIC BILATERAL LOW BACK PAIN WITHOUT SCIATICA: ICD-10-CM

## 2022-07-06 DIAGNOSIS — G89.29 CHRONIC PAIN OF BOTH HIPS: ICD-10-CM

## 2022-07-06 DIAGNOSIS — M25.551 CHRONIC PAIN OF BOTH HIPS: ICD-10-CM

## 2022-07-06 DIAGNOSIS — M25.552 CHRONIC PAIN OF BOTH HIPS: ICD-10-CM

## 2022-07-06 RX ORDER — HYDROCODONE BITARTRATE AND ACETAMINOPHEN 10; 325 MG/1; MG/1
1 TABLET ORAL EVERY 6 HOURS PRN
Qty: 60 TABLET | Refills: 0 | Status: SHIPPED | OUTPATIENT
Start: 2022-07-06 | End: 2022-08-08 | Stop reason: SDUPTHER

## 2022-07-06 NOTE — TELEPHONE ENCOUNTER
Reviewed MY Nguyen last office visit on 6/6/2022. LINDSEYS and RUTH reviewed and are appropriate. Due to provider being out of office, will refill appropriately.

## 2022-07-06 NOTE — TELEPHONE ENCOUNTER
Medication Refill Request    Date of phone call: 7/6/2022    Medication being requested: norco  mg  sig: Take 1 tablet by mouth Every 6 (Six) Hours As Needed for Moderate Pain   Qty: 60    Date of last visit: 6/6/2022    Date of last refill:     RUTH up to date?:     Next Follow up?: 8/8/2022    Any new pertinent information? (i.e, new medication allergies, new use of medications, change in patient's health or condition, non-compliance or inconsistency with prescribing agreement?):

## 2022-07-19 ENCOUNTER — TELEPHONE (OUTPATIENT)
Dept: FAMILY MEDICINE CLINIC | Facility: CLINIC | Age: 77
End: 2022-07-19

## 2022-07-19 NOTE — TELEPHONE ENCOUNTER
They gave him Bactroban, ointment, which is expensive. He can use Bacitracin OTC if he'd like (3x/day) or I can send in the Cream version of what they gave him, as it's sometimes less expensive. Let me know.

## 2022-07-19 NOTE — TELEPHONE ENCOUNTER
Patient was seen yesterday at the  and was given an ointment that is very expensive and when he contacted the  they told him to reach out to his PCP for another ointment?

## 2022-08-08 ENCOUNTER — OFFICE VISIT (OUTPATIENT)
Dept: PAIN MEDICINE | Facility: CLINIC | Age: 77
End: 2022-08-08

## 2022-08-08 VITALS
BODY MASS INDEX: 25.98 KG/M2 | OXYGEN SATURATION: 99 % | HEART RATE: 60 BPM | HEIGHT: 68 IN | WEIGHT: 171.4 LBS | TEMPERATURE: 97.7 F | RESPIRATION RATE: 12 BRPM | DIASTOLIC BLOOD PRESSURE: 74 MMHG | SYSTOLIC BLOOD PRESSURE: 164 MMHG

## 2022-08-08 DIAGNOSIS — Z79.899 ENCOUNTER FOR LONG-TERM (CURRENT) USE OF HIGH-RISK MEDICATION: Primary | ICD-10-CM

## 2022-08-08 DIAGNOSIS — G89.29 CHRONIC PAIN OF BOTH HIPS: ICD-10-CM

## 2022-08-08 DIAGNOSIS — M54.50 CHRONIC BILATERAL LOW BACK PAIN WITHOUT SCIATICA: ICD-10-CM

## 2022-08-08 DIAGNOSIS — M25.551 CHRONIC PAIN OF BOTH HIPS: ICD-10-CM

## 2022-08-08 DIAGNOSIS — G89.29 CHRONIC BILATERAL LOW BACK PAIN WITHOUT SCIATICA: ICD-10-CM

## 2022-08-08 DIAGNOSIS — M47.816 LUMBAR FACET ARTHROPATHY: ICD-10-CM

## 2022-08-08 DIAGNOSIS — M25.50 ARTHRALGIA OF MULTIPLE JOINTS: ICD-10-CM

## 2022-08-08 DIAGNOSIS — M25.552 CHRONIC PAIN OF BOTH HIPS: ICD-10-CM

## 2022-08-08 PROCEDURE — 80305 DRUG TEST PRSMV DIR OPT OBS: CPT | Performed by: NURSE PRACTITIONER

## 2022-08-08 PROCEDURE — 99214 OFFICE O/P EST MOD 30 MIN: CPT | Performed by: NURSE PRACTITIONER

## 2022-08-08 RX ORDER — HYDROCODONE BITARTRATE AND ACETAMINOPHEN 10; 325 MG/1; MG/1
1 TABLET ORAL EVERY 6 HOURS PRN
Qty: 60 TABLET | Refills: 0 | Status: SHIPPED | OUTPATIENT
Start: 2022-08-08 | End: 2022-09-06 | Stop reason: SDUPTHER

## 2022-08-08 NOTE — PROGRESS NOTES
CHIEF COMPLAINT  2 month follow up for back and joint pain. Reports his pain has continued stabilized over the last 8 weeks.     Subjective   Madhu Santoro is a 76 y.o. male  who presents for follow-up.  He has a history of back and joint pain. Today his pain is 7/10VAS in severity. He continues with Norco 10mg 2/day and uses OTC Tylenol PRN. This medication regimen decreases his pain by a moderate amount.  At his last office visit he was given a trial of relistor, this was helpful to his constipation, but was cost-prohibitive. He was then prescribed Movantik. He has not picked this up. He is currently utilizing OTC dulcolax PRN. He is not utilizing this daily. He denies any other side effects from his medication regimen.     No interest in procedures due to close friend having a poor outcome following injection for back pain.     No improvement with PT.      Not a candidate for NSAIDs--on Eliquis.     Back Pain  This is a chronic problem. The current episode started more than 1 year ago. The problem occurs constantly. The problem has been waxing and waning since onset. The pain is present in the lumbar spine (across his belt line). The quality of the pain is described as aching. Radiates to: whole leg aching pain bilaterally with standing and walking. The pain is at a severity of 7/10. The pain is the same all the time. The symptoms are aggravated by standing, sitting and position (walking). Stiffness is present all day. Associated symptoms include numbness (toes), tingling and weakness. Pertinent negatives include no abdominal pain, chest pain, dysuria, fever or headaches. He has tried analgesics, heat and home exercises (Norco 10/325) for the symptoms. The treatment provided mild relief.   Joint Pain  This is a chronic (7/10VAS in severity) problem. The current episode started more than 1 year ago. The problem occurs constantly. The problem has been waxing and waning. Associated symptoms include arthralgias  (joint), numbness (toes) and weakness. Pertinent negatives include no abdominal pain, chest pain, congestion, coughing, fatigue, fever or headaches. The symptoms are aggravated by standing and walking (ROM). He has tried heat (Norco 10/325, steroid injections) for the symptoms.      PEG Assessment   What number best describes your pain on average in the past week?8  What number best describes how, during the past week, pain has interfered with your enjoyment of life?8  What number best describes how, during the past week, pain has interfered with your general activity?  8    The following portions of the patient's history were reviewed and updated as appropriate: allergies, current medications, past family history, past medical history, past social history, past surgical history and problem list.    Review of Systems   Constitutional: Negative for activity change, fatigue and fever.   HENT: Negative for congestion.    Eyes: Negative for visual disturbance.   Respiratory: Negative for cough, chest tightness and shortness of breath.    Cardiovascular: Negative for chest pain.   Gastrointestinal: Negative for abdominal pain, constipation and diarrhea.   Genitourinary: Negative for difficulty urinating and dysuria.   Musculoskeletal: Positive for arthralgias (joint) and back pain.   Neurological: Positive for tingling, weakness and numbness (toes). Negative for dizziness, light-headedness and headaches.   Psychiatric/Behavioral: Negative for agitation, sleep disturbance and suicidal ideas. The patient is not nervous/anxious.      --  The aforementioned information the Chief Complaint section and above subjective data including any HPI data, and also the Review of Systems data, has been personally reviewed and affirmed.  --    Vitals:    08/08/22 1058   BP: 164/74   BP Location: Left arm   Patient Position: Sitting   Pulse: 60   Resp: 12   Temp: 97.7 °F (36.5 °C)   SpO2: 99%   Weight: 77.7 kg (171 lb 6.4 oz)   Height:  "172.7 cm (68\")   PainSc:   7   PainLoc: Back  Comment: joint     Objective   Physical Exam  Vitals and nursing note reviewed.   Constitutional:       Appearance: Normal appearance. He is well-developed.   Eyes:      General: Lids are normal.   Cardiovascular:      Rate and Rhythm: Normal rate.   Pulmonary:      Effort: Pulmonary effort is normal.   Musculoskeletal:      Lumbar back: Tenderness present. Decreased range of motion.      Comments:   Diffuse arthritic changes   Neurological:      Mental Status: He is alert and oriented to person, place, and time.   Psychiatric:         Attention and Perception: Attention normal.         Mood and Affect: Mood normal.         Speech: Speech normal.         Behavior: Behavior normal.         Judgment: Judgment normal.       Assessment & Plan   Diagnoses and all orders for this visit:    1. Encounter for long-term (current) use of high-risk medication (Primary)    2. Chronic bilateral low back pain without sciatica  -     HYDROcodone-acetaminophen (NORCO)  MG per tablet; Take 1 tablet by mouth Every 6 (Six) Hours As Needed for Moderate Pain . Take at least 6 hours apart. Max 2/day. 30 day supply  Dispense: 60 tablet; Refill: 0    3. Chronic pain of both hips  -     HYDROcodone-acetaminophen (NORCO)  MG per tablet; Take 1 tablet by mouth Every 6 (Six) Hours As Needed for Moderate Pain . Take at least 6 hours apart. Max 2/day. 30 day supply  Dispense: 60 tablet; Refill: 0    4. Arthralgia of multiple joints    5. Lumbar facet arthropathy      --- Routine UDS in office today as part of monitoring requirements for controlled substances.  The specimen was viewed and the immunoassay result reviewed and is +OPI.  This specimen will be sent to [facility] for confirmation.     --- CSA updated 6/6/2022  --- Refill [place] 10/325. Patient appears stable with current regimen. No adverse effects. Regarding continuation of opioids, there is no evidence of aberrant " behavior or any red flags.  The patient continues with appropriate response to opioid therapy. ADL's remain intact by self.   --- Follow-up 2 months or sooner if needed.      RUTH REPORT  As part of the patient's treatment plan, I am prescribing controlled substances. The patient has been made aware of appropriate use of such medications, including potential risk of somnolence, limited ability to drive and/or work safely, and the potential for dependence or overdose. It has also been made clear that these medications are for use by this patient only, without concomitant use of alcohol or other substances unless prescribed.     Patient has completed prescribing agreement detailing terms of continued prescribing of controlled substances, including monitoring RUTH reports, urine drug screening, and pill counts if necessary. The patient is aware that inappropriate use will results in cessation of prescribing such medications.    As the clinician, I personally reviewed the RUTH from 8/8/2022 while the patient was in the office today.    History and physical exam exhibit continued safe and appropriate use of controlled substances.    Dictated utilizing Dragon dictation.     This document is intended for medical expert use only. Reading of this document by patients and/or patient's family without participating medical staff guidance may result in misinterpretation and unintended morbidity.   Any interpretation of such data is the responsibility of the patient and/or family member responsible for the patient in concert with their primary or specialist providers, not to be left for sources of online searches such as [employer], [employer] or similar queries. Relying on these approaches to knowledge may result in misinterpretation, misguided goals of care and even death should patients or family members try recommendations outside of the realm of professional medical care in a supervised way.    Patient remained masked during  entire encounter. No cough present. I donned a mask and eye protection throughout entire visit. Prior to donning mask and eye protection, hand hygiene was performed, as well as when it was doffed.  I was closer than 6 feet, but not for an extended period of time. No obvious exposure to any bodily fluids.

## 2022-08-09 ENCOUNTER — HOSPITAL ENCOUNTER (OUTPATIENT)
Dept: MRI IMAGING | Facility: HOSPITAL | Age: 77
Discharge: HOME | End: 2022-08-09
Admitting: NURSE PRACTITIONER

## 2022-08-09 DIAGNOSIS — K86.2 PANCREATIC CYST: ICD-10-CM

## 2022-08-09 PROCEDURE — A9577 INJ MULTIHANCE: HCPCS | Performed by: NURSE PRACTITIONER

## 2022-08-09 PROCEDURE — 82565 ASSAY OF CREATININE: CPT

## 2022-08-09 PROCEDURE — 74183 MRI ABD W/O CNTR FLWD CNTR: CPT

## 2022-08-09 PROCEDURE — 0 GADOBENATE DIMEGLUMINE 529 MG/ML SOLUTION: Performed by: NURSE PRACTITIONER

## 2022-08-09 RX ADMIN — GADOBENATE DIMEGLUMINE 16 ML: 529 INJECTION, SOLUTION INTRAVENOUS at 12:11

## 2022-08-14 NOTE — PROGRESS NOTES
Subjective   Madhu Santoro is a 76 y.o. male.     History of Present Illness     Chief Complaint:   Chief Complaint   Patient presents with   • Hypertension     Med refill due  / lab results / [facility]    • Hyperlipidemia   • Heartburn   • MEDICARE WELLNESS     ............       Madhu Santoro 76 y.o. male who presents today for Medical Management of the below listed issues. He  has a problem list of   Patient Active Problem List   Diagnosis   • Anxiety   • Arthritis   • Chronic coronary artery disease   • HLD (hyperlipidemia)   • Benign essential hypertension   • DDD (degenerative disc disease), lumbosacral   • Cerebrovascular accident (HCC)   • Encounter for screening for cardiovascular disorders   • Gastroesophageal reflux disease   • Hyperlipidemia   • Stenosis of carotid artery   • Former smoker   • History of cardiac catheterization   • S/P ablation of atrial flutter   • Typical atrial flutter ([facility])   • S/P CABG (coronary artery bypass graft)   • Adenomatous polyp of ascending colon   • Abdominal bloating   • Stroke ([facility])   • PAD (peripheral artery disease) ([facility])   • Acute cholecystitis   • Right upper quadrant abdominal pain   • Chronic pain of both hips   • Chronic bilateral low back pain without sciatica   • Sacroiliac joint dysfunction of both sides   • Encounter for long-term (current) use of high-risk medication   • Lumbar facet arthropathy   • Stroke-like symptoms   • CVA (cerebral vascular accident) ([facility])   • Personal history of colonic polyps   • Arthralgia of multiple joints   .  Since the last visit, He has overall felt well.  he has been compliant with   Current Outpatient Medications:   •  pantoprazole (PROTONIX) 40 MG EC tablet, Take 1 tablet by mouth Daily., Disp: 90 tablet, Rfl: 1  •  ramipril (ALTACE) 5 MG capsule, Take 1 capsule by mouth Daily., Disp: 90 capsule, Rfl: 1  •  rosuvastatin (Crestor) 20 MG tablet, Take 1 tablet by mouth Daily., Disp: 90 tablet, Rfl: 1  •  aluminum-magnesium  "hydroxide-simethicone (MAALOX MAX) 400-400-40 MG/5ML suspension, Take 20 mL by mouth Every 6 (Six) Hours As Needed for Indigestion or Heartburn., Disp: , Rfl:   •  amLODIPine (NORVASC) 2.5 MG tablet, TAKE ONE TABLET BY MOUTH DAILY, Disp: 30 tablet, Rfl: 11  •  apixaban (ELIQUIS) 5 MG tablet tablet, Take 1 tablet by mouth 2 (Two) Times a Day. Lot # [id number] Exp 10/23, Disp: 28 tablet, Rfl: 0  •  aspirin (aspirin) 81 MG EC tablet, Take 1 tablet by mouth Daily., Disp: 30 tablet, Rfl: 0  •  HYDROcodone-acetaminophen (NORCO)  MG per tablet, Take 1 tablet by mouth Every 6 (Six) Hours As Needed for Moderate Pain . Take at least 6 hours apart. Max 2/day. 30 day supply, Disp: 60 tablet, Rfl: 0  •  mupirocin (BACTROBAN) 2 % cream, Apply 1 application topically to the appropriate area as directed 2 (Two) Times a Day., Disp: 15 g, Rfl: 0  •  Myrbetriq 25 MG tablet sustained-release 24 hour 24 hr tablet, Take 25 mg by mouth Daily., Disp: , Rfl:   •  Naloxegol Oxalate (Movantik) 25 MG tablet, Take 1 tablet by mouth Every Morning., Disp: 30 tablet, Rfl: 0  •  nitroglycerin (NITROSTAT) 0.4 MG SL tablet, Place 1 tablet under the tongue Every 5 (Five) Minutes As Needed for Chest Pain. Take no more than 3 doses in 15 minutes., Disp: 20 tablet, Rfl: 2  •  sucralfate (Carafate) 1 GM/10ML suspension, Take 10 mL by mouth 2 (Two) Times a Day., Disp: 420 mL, Rfl: 1.  He denies medication side effects.    All of the other chronic condition(s) listed above are stable w/o issues.    /68   Pulse 58   Temp 97.8 °F (36.6 °C) (Oral)   Resp 14   Ht 172.7 cm (68\")   Wt 77.1 kg (170 lb)   BMI 25.85 kg/m²     Results for orders placed or performed during the hospital encounter of 08/09/22   POC Creatinine    Specimen: Blood   Result Value Ref Range    Creatinine 1.10 0.60 - 1.30 mg/dL             The following portions of the patient's history were reviewed and updated as appropriate: allergies, current medications, past family " history, past medical history, past social history, past surgical history, and problem list.    Review of Systems   Constitutional: Negative for activity change, chills and fever.   Respiratory: Negative for cough.    Cardiovascular: Negative for chest pain.   Psychiatric/Behavioral: Negative for dysphoric mood.       Objective   Physical Exam  Constitutional:       General: He is not in acute distress.     Appearance: He is well-developed.   Cardiovascular:      Rate and Rhythm: Normal rate and regular rhythm.   Pulmonary:      Effort: Pulmonary effort is normal.      Breath sounds: Normal breath sounds.   Neurological:      Mental Status: He is alert and oriented to person, place, and time.   Psychiatric:         Behavior: Behavior normal.         Thought Content: Thought content normal.     Labs reviewed with pt today during visit. All questions answered.          Diagnoses and all orders for this visit:    1. Medicare annual wellness visit, subsequent (Primary)    2. Coronary artery disease due to lipid rich plaque  -     ramipril (ALTACE) 5 MG capsule; Take 1 capsule by mouth Daily.  Dispense: 90 capsule; Refill: 1    3. Benign essential hypertension  -     ramipril (ALTACE) 5 MG capsule; Take 1 capsule by mouth Daily.  Dispense: 90 capsule; Refill: 1    4. Hyperlipidemia, unspecified hyperlipidemia type  -     rosuvastatin (Crestor) 20 MG tablet; Take 1 tablet by mouth Daily.  Dispense: 90 tablet; Refill: 1    5. Gastroesophageal reflux disease without esophagitis  -     pantoprazole (PROTONIX) 40 MG EC tablet; Take 1 tablet by mouth Daily.  Dispense: 90 tablet; Refill: 1    6. Thrombocytosis  Comments:  Directly addressed at today's visit  Orders:  -     Ambulatory Referral to Hematology    7. Erythrocytosis  Comments:  Directly addressed at today's visit  Orders:  -     Ambulatory Referral to Hematology    8. Hyperkalemia  Comments:  Directly addressed at today's visit  Orders:  -     Basic Metabolic  Panel

## 2022-08-15 ENCOUNTER — OFFICE VISIT (OUTPATIENT)
Dept: FAMILY MEDICINE CLINIC | Facility: CLINIC | Age: 77
End: 2022-08-15

## 2022-08-15 VITALS
SYSTOLIC BLOOD PRESSURE: 126 MMHG | WEIGHT: 170 LBS | DIASTOLIC BLOOD PRESSURE: 68 MMHG | RESPIRATION RATE: 14 BRPM | HEART RATE: 58 BPM | TEMPERATURE: 97.8 F | BODY MASS INDEX: 25.76 KG/M2 | HEIGHT: 68 IN

## 2022-08-15 DIAGNOSIS — D75.1 ERYTHROCYTOSIS: ICD-10-CM

## 2022-08-15 DIAGNOSIS — E78.5 HYPERLIPIDEMIA, UNSPECIFIED HYPERLIPIDEMIA TYPE: Chronic | ICD-10-CM

## 2022-08-15 DIAGNOSIS — K21.9 GASTROESOPHAGEAL REFLUX DISEASE WITHOUT ESOPHAGITIS: Chronic | ICD-10-CM

## 2022-08-15 DIAGNOSIS — Z00.00 MEDICARE ANNUAL WELLNESS VISIT, SUBSEQUENT: Primary | ICD-10-CM

## 2022-08-15 DIAGNOSIS — E87.5 HYPERKALEMIA: ICD-10-CM

## 2022-08-15 DIAGNOSIS — I10 BENIGN ESSENTIAL HYPERTENSION: Chronic | ICD-10-CM

## 2022-08-15 DIAGNOSIS — I25.83 CORONARY ARTERY DISEASE DUE TO LIPID RICH PLAQUE: Chronic | ICD-10-CM

## 2022-08-15 DIAGNOSIS — I25.10 CORONARY ARTERY DISEASE DUE TO LIPID RICH PLAQUE: Chronic | ICD-10-CM

## 2022-08-15 DIAGNOSIS — D75.839 THROMBOCYTOSIS: ICD-10-CM

## 2022-08-15 LAB — CREAT BLDA-MCNC: 1.1 MG/DL (ref 0.6–1.3)

## 2022-08-15 PROCEDURE — 1159F MED LIST DOCD IN RCRD: CPT | Performed by: FAMILY MEDICINE

## 2022-08-15 PROCEDURE — 99214 OFFICE O/P EST MOD 30 MIN: CPT | Performed by: FAMILY MEDICINE

## 2022-08-15 PROCEDURE — G0439 PPPS, SUBSEQ VISIT: HCPCS | Performed by: FAMILY MEDICINE

## 2022-08-15 PROCEDURE — 1170F FXNL STATUS ASSESSED: CPT | Performed by: FAMILY MEDICINE

## 2022-08-15 PROCEDURE — 1125F AMNT PAIN NOTED PAIN PRSNT: CPT | Performed by: FAMILY MEDICINE

## 2022-08-15 RX ORDER — PANTOPRAZOLE SODIUM 40 MG/1
40 TABLET, DELAYED RELEASE ORAL DAILY
Qty: 90 TABLET | Refills: 1 | Status: SHIPPED | OUTPATIENT
Start: 2022-08-15 | End: 2023-01-06 | Stop reason: SDUPTHER

## 2022-08-15 RX ORDER — RAMIPRIL 5 MG/1
5 CAPSULE ORAL DAILY
Qty: 90 CAPSULE | Refills: 1 | Status: SHIPPED | OUTPATIENT
Start: 2022-08-15 | End: 2023-01-06 | Stop reason: SDUPTHER

## 2022-08-15 RX ORDER — AMLODIPINE BESYLATE 2.5 MG/1
TABLET ORAL
Qty: 30 TABLET | Refills: 11 | Status: SHIPPED | OUTPATIENT
Start: 2022-08-15 | End: 2023-01-12 | Stop reason: SDUPTHER

## 2022-08-15 RX ORDER — ROSUVASTATIN CALCIUM 20 MG/1
20 TABLET, COATED ORAL DAILY
Qty: 90 TABLET | Refills: 1 | Status: SHIPPED | OUTPATIENT
Start: 2022-08-15 | End: 2023-01-06 | Stop reason: SDUPTHER

## 2022-08-15 NOTE — PROGRESS NOTES
Please inform the patient that his MRCP shows stable pancreatic lesion.  This is reassuring.  Recommend repeating MRCP in 6 months.

## 2022-08-15 NOTE — PATIENT INSTRUCTIONS
Medicare Wellness  Personal Prevention Plan of Service     Date of Office Visit:    Encounter Provider:  Damian Sanchez MD  Place of Service:  [facility]  Patient Name: Madhu Santoro  :  1945    As part of the Medicare Wellness portion of your visit today, we are providing you with this personalized preventive plan of services (PPPS). This plan is based upon recommendations of the [place] Preventive Services Task Force (USPSTF) and the Advisory Committee on Immunization Practices (ACIP).    This lists the preventive care services that should be considered, and provides dates of when you are due. Items listed as completed are up-to-date and do not require any further intervention.    Health Maintenance   Topic Date Due    HEPATITIS C SCREENING  Never done    Pneumococcal Vaccine 65+ (2 - PPSV23 or PCV20) 2016    ZOSTER VACCINE (2 of 2) 08/15/2022 (Originally 2017)    COVID-19 Vaccine (4 - Booster for Pfizer series) 2022 (Originally 2022)    INFLUENZA VACCINE  10/01/2022    LIPID PANEL  2023    ANNUAL WELLNESS VISIT  08/15/2023    GASTROSCOPY (EGD)  2024    COLORECTAL CANCER SCREENING  2024    TDAP/TD VACCINES  Discontinued       Orders Placed This Encounter   Procedures    Basic Metabolic Panel     Order Specific Question:   Release to patient     Answer:   Routine Release    Ambulatory Referral to Hematology     Referral Priority:   Urgent     Referral Type:   Consultation     Referral Reason:   Specialty Services Required     Requested Specialty:   Hematology     Number of Visits Requested:   1       Return in about 6 months (around 2/15/2023) for Recheck.

## 2022-08-15 NOTE — PROGRESS NOTES
The ABCs of the Annual Wellness Visit  Subsequent Medicare Wellness Visit    Chief Complaint   Patient presents with   • Hypertension     Med refill due  / lab results / [facility]    • Hyperlipidemia   • Heartburn   • MEDICARE WELLNESS     ............      Subjective    History of Present Illness:  Madhu Santoro is a 76 y.o. male who presents for a Subsequent Medicare Wellness Visit.    The following portions of the patient's history were reviewed and   updated as appropriate: allergies, current medications, past family history, past medical history, past social history, past surgical history and problem list.    Compared to one year ago, the patient feels his physical   health is the same.    Compared to one year ago, the patient feels his mental   health is the same.    Recent Hospitalizations:  He was not admitted to the hospital during the last year.       Current Medical Providers:  Patient Care Team:  Damian Sanchez MD as PCP - General (Family Medicine)  Jr Temo Cortez MD as Surgeon (Cardiothoracic Surgery)  Netta Mayorga Jr., MD as Consulting Physician (Vascular Surgery)    Outpatient Medications Prior to Visit   Medication Sig Dispense Refill   • aluminum-magnesium hydroxide-simethicone (MAALOX MAX) 400-400-40 MG/5ML suspension Take 20 mL by mouth Every 6 (Six) Hours As Needed for Indigestion or Heartburn.     • apixaban (ELIQUIS) 5 MG tablet tablet Take 1 tablet by mouth 2 (Two) Times a Day. Lot # [id number]  Exp 10/23 28 tablet 0   • aspirin (aspirin) 81 MG EC tablet Take 1 tablet by mouth Daily. 30 tablet 0   • HYDROcodone-acetaminophen (NORCO)  MG per tablet Take 1 tablet by mouth Every 6 (Six) Hours As Needed for Moderate Pain . Take at least 6 hours apart. Max 2/day. 30 day supply 60 tablet 0   • mupirocin (BACTROBAN) 2 % cream Apply 1 application topically to the appropriate area as directed 2 (Two) Times a Day. 15 g 0   • Myrbetriq 25 MG tablet sustained-release 24 hour 24 hr  tablet Take 25 mg by mouth Daily.     • Naloxegol Oxalate (Movantik) 25 MG tablet Take 1 tablet by mouth Every Morning. 30 tablet 0   • nitroglycerin (NITROSTAT) 0.4 MG SL tablet Place 1 tablet under the tongue Every 5 (Five) Minutes As Needed for Chest Pain. Take no more than 3 doses in 15 minutes. 20 tablet 2   • sucralfate (Carafate) 1 GM/10ML suspension Take 10 mL by mouth 2 (Two) Times a Day. 420 mL 1   • amLODIPine (NORVASC) 2.5 MG tablet Take 1 tablet by mouth Daily. 30 tablet 11   • pantoprazole (PROTONIX) 40 MG EC tablet Take 1 tablet by mouth Daily. 90 tablet 1   • ramipril (ALTACE) 5 MG capsule Take 1 capsule by mouth Daily. 90 capsule 1   • rosuvastatin (Crestor) 20 MG tablet Take 1 tablet by mouth Daily. 90 tablet 1     No facility-administered medications prior to visit.       Opioid medication/s are on active medication list.  and I have evaluated his active treatment plan and pain score trends (see table).  Vitals:    08/10/22 0930   PainSc:   5     I have reviewed the chart for potential of high risk medication and harmful drug interactions in the elderly.            Aspirin is on active medication list. Aspirin use is indicated based on review of current medical condition/s. Pros and cons of this therapy have been discussed today. Benefits of this medication outweigh potential harm.  Patient has been encouraged to continue taking this medication.  .      Patient Active Problem List   Diagnosis   • Anxiety   • Arthritis   • Chronic coronary artery disease   • HLD (hyperlipidemia)   • Benign essential hypertension   • DDD (degenerative disc disease), lumbosacral   • Cerebrovascular accident (HCC)   • Encounter for screening for cardiovascular disorders   • Gastroesophageal reflux disease   • Hyperlipidemia   • Stenosis of carotid artery   • Former smoker   • History of cardiac catheterization   • S/P ablation of atrial flutter   • Typical atrial flutter (HCC)   • S/P CABG (coronary artery bypass  "graft)   • Adenomatous polyp of ascending colon   • Abdominal bloating   • Stroke (HCC)   • PAD (peripheral artery disease) (HCC)   • Acute cholecystitis   • Right upper quadrant abdominal pain   • Chronic pain of both hips   • Chronic bilateral low back pain without sciatica   • Sacroiliac joint dysfunction of both sides   • Encounter for long-term (current) use of high-risk medication   • Lumbar facet arthropathy   • Stroke-like symptoms   • CVA (cerebral vascular accident) (HCC)   • Personal history of colonic polyps   • Arthralgia of multiple joints     Advance Care Planning  Advance Directive is on file.  ACP discussion was held with the patient during this visit. Patient has an advance directive in EMR which is still valid.           Objective    Vitals:    08/10/22 0930   BP: 126/68   Pulse: 58   Resp: 14   Temp: 97.8 °F (36.6 °C)   TempSrc: Oral   Weight: 77.1 kg (170 lb)   Height: 172.7 cm (68\")   PainSc:   5     Estimated body mass index is 25.85 kg/m² as calculated from the following:    Height as of this encounter: 172.7 cm (68\").    Weight as of this encounter: 77.1 kg (170 lb).    BMI is >= 25 and <30. (Overweight) The following options were offered after discussion;: exercise counseling/recommendations and nutrition counseling/recommendations      Does the patient have evidence of cognitive impairment? No    Physical Exam  Lab Results   Component Value Date    CHLPL 132 2022    TRIG 66 2022            HEALTH RISK ASSESSMENT    Smoking Status:  Social History     Tobacco Use   Smoking Status Former Smoker   • Packs/day: 1.00   • Types: Cigarettes   • Start date: 1959   • Quit date: 2009   • Years since quittin.6   Smokeless Tobacco Never Used   Tobacco Comment    CAFFEINE USE     Alcohol Consumption:  Social History     Substance and Sexual Activity   Alcohol Use Yes    Comment: rarely     Fall Risk Screen:    STEADI Fall Risk Assessment has not been completed.    Depression " Screening:  PHQ-2/PHQ-9 Depression Screening 8/15/2022   Retired PHQ-9 Total Score -   Retired Total Score -   Little Interest or Pleasure in Doing Things 0-->not at all   Feeling Down, Depressed or Hopeless 0-->not at all   Trouble Falling or Staying Asleep, or Sleeping Too Much -   Feeling Tired or Having Little Energy -   Poor Appetite or Overeating -   Feeling Bad about Yourself - or that You are a Failure or Have Let Yourself or Your Family Down -   Trouble Concentrating on Things, Such as Reading the Newspaper or Watching Television -   Moving or Speaking So Slowly that Other People Could Have Noticed? Or the Opposite - Being So Fidgety -   Thoughts that You Would be Better Off Dead or of Hurting Yourself in Some Way -   PHQ-9: Brief Depression Severity Measure Score 0   If You Checked Off Any Problems, How Difficult Have These Problems Made It For You to Do Your Work, Take Care of Things at Home, or Get Along with Other People? -       Health Habits and Functional and Cognitive Screening:  Functional & Cognitive Status 8/15/2022   Do you have difficulty preparing food and eating? No   Do you have difficulty bathing yourself, getting dressed or grooming yourself? No   Do you have difficulty using the toilet? No   Do you have difficulty moving around from place to place? No   Do you have trouble with steps or getting out of a bed or a chair? No   Current Diet Well Balanced Diet   Dental Exam Up to date   Eye Exam Up to date   Exercise (times per week) 3 times per week   Current Exercises Include Walking   Do you need help using the phone?  No   Are you deaf or do you have serious difficulty hearing?  No   Do you need help with transportation? No   Do you need help shopping? No   Do you need help preparing meals?  No   Do you need help with housework?  No   Do you need help with laundry? No   Do you need help taking your medications? No   Do you need help managing money? No   Do you ever drive or ride in a car  without wearing a seat belt? No   Have you felt unusual stress, anger or loneliness in the last month? No   Who do you live with? Spouse   If you need help, do you have trouble finding someone available to you? Yes   Have you been bothered in the last four weeks by sexual problems? No   Do you have difficulty concentrating, remembering or making decisions? No       Age-appropriate Screening Schedule:  Refer to the list below for future screening recommendations based on patient's age, sex and/or medical conditions. Orders for these recommended tests are listed in the plan section. The patient has been provided with a written plan.    Health Maintenance   Topic Date Due   • ZOSTER VACCINE (2 of 2) 08/15/2022 (Originally 11/20/2017)   • INFLUENZA VACCINE  10/01/2022   • LIPID PANEL  08/11/2023   • TDAP/TD VACCINES  Discontinued              Assessment & Plan   CMS Preventative Services Quick Reference  Risk Factors Identified During Encounter  Cardiovascular Disease  The above risks/problems have been discussed with the patient.  Follow up actions/plans if indicated are seen below in the Assessment/Plan Section.  Pertinent information has been shared with the patient in the After Visit Summary.    Diagnoses and all orders for this visit:    1. Medicare annual wellness visit, subsequent (Primary)    2. Coronary artery disease due to lipid rich plaque  -     ramipril (ALTACE) 5 MG capsule; Take 1 capsule by mouth Daily.  Dispense: 90 capsule; Refill: 1    3. Benign essential hypertension  -     ramipril (ALTACE) 5 MG capsule; Take 1 capsule by mouth Daily.  Dispense: 90 capsule; Refill: 1    4. Hyperlipidemia, unspecified hyperlipidemia type  -     rosuvastatin (Crestor) 20 MG tablet; Take 1 tablet by mouth Daily.  Dispense: 90 tablet; Refill: 1    5. Gastroesophageal reflux disease without esophagitis  -     pantoprazole (PROTONIX) 40 MG EC tablet; Take 1 tablet by mouth Daily.  Dispense: 90 tablet; Refill: 1    6.  Thrombocytosis  Comments:  Directly addressed at today's visit  Orders:  -     Ambulatory Referral to Hematology    7. Erythrocytosis  Comments:  Directly addressed at today's visit  Orders:  -     Ambulatory Referral to Hematology    8. Hyperkalemia  Comments:  Directly addressed at today's visit  Orders:  -     Basic Metabolic Panel        Follow Up:   No follow-ups on file.     An After Visit Summary and PPPS were made available to the patient.          I spent 12 minutes caring for Madhu on this date of service. This time includes time spent by me in the following activities:preparing for the visit and reviewing tests

## 2022-08-16 ENCOUNTER — LAB (OUTPATIENT)
Dept: OTHER | Facility: HOSPITAL | Age: 77
End: 2022-08-16

## 2022-08-16 ENCOUNTER — CONSULT (OUTPATIENT)
Dept: ONCOLOGY | Facility: CLINIC | Age: 77
End: 2022-08-16

## 2022-08-16 ENCOUNTER — TELEPHONE (OUTPATIENT)
Dept: GASTROENTEROLOGY | Facility: CLINIC | Age: 77
End: 2022-08-16

## 2022-08-16 VITALS
WEIGHT: 170 LBS | OXYGEN SATURATION: 98 % | DIASTOLIC BLOOD PRESSURE: 80 MMHG | SYSTOLIC BLOOD PRESSURE: 161 MMHG | HEIGHT: 68 IN | RESPIRATION RATE: 16 BRPM | BODY MASS INDEX: 25.76 KG/M2 | HEART RATE: 77 BPM | TEMPERATURE: 97.3 F

## 2022-08-16 DIAGNOSIS — D69.6 THROMBOCYTOPENIA: ICD-10-CM

## 2022-08-16 DIAGNOSIS — Z79.899 ENCOUNTER FOR LONG-TERM (CURRENT) USE OF HIGH-RISK MEDICATION: ICD-10-CM

## 2022-08-16 DIAGNOSIS — I25.10 CHRONIC CORONARY ARTERY DISEASE: ICD-10-CM

## 2022-08-16 DIAGNOSIS — E78.5 HYPERLIPIDEMIA, UNSPECIFIED HYPERLIPIDEMIA TYPE: ICD-10-CM

## 2022-08-16 DIAGNOSIS — D75.1 ERYTHROCYTOSIS: ICD-10-CM

## 2022-08-16 DIAGNOSIS — I25.10 CHRONIC CORONARY ARTERY DISEASE: Primary | ICD-10-CM

## 2022-08-16 DIAGNOSIS — K86.2 PANCREATIC CYST: Primary | ICD-10-CM

## 2022-08-16 DIAGNOSIS — E61.1 IRON DEFICIENCY: ICD-10-CM

## 2022-08-16 DIAGNOSIS — D75.839 THROMBOCYTOSIS: ICD-10-CM

## 2022-08-16 DIAGNOSIS — K21.9 GASTROESOPHAGEAL REFLUX DISEASE WITHOUT ESOPHAGITIS: Primary | ICD-10-CM

## 2022-08-16 DIAGNOSIS — D75.839 THROMBOCYTOSIS: Primary | ICD-10-CM

## 2022-08-16 LAB
ANISOCYTOSIS BLD QL: NORMAL
BASOPHILS # BLD AUTO: 0.05 10*3/MM3 (ref 0–0.2)
BASOPHILS NFR BLD AUTO: 0.4 % (ref 0–1.5)
BUN SERPL-MCNC: 20 MG/DL (ref 8–27)
BUN/CREAT SERPL: 19 (ref 10–24)
CALCIUM SERPL-MCNC: 10 MG/DL (ref 8.6–10.2)
CHLORIDE SERPL-SCNC: 105 MMOL/L (ref 96–106)
CO2 SERPL-SCNC: 22 MMOL/L (ref 20–29)
CREAT SERPL-MCNC: 1.04 MG/DL (ref 0.76–1.27)
DEPRECATED RDW RBC AUTO: 49.1 FL (ref 37–54)
EGFRCR-CYS SERPLBLD CKD-EPI 2021: 74 ML/MIN/1.73
EOSINOPHIL # BLD AUTO: 0.22 10*3/MM3 (ref 0–0.4)
EOSINOPHIL NFR BLD AUTO: 1.8 % (ref 0.3–6.2)
ERYTHROCYTE [DISTWIDTH] IN BLOOD BY AUTOMATED COUNT: 19.9 % (ref 12.3–15.4)
FERRITIN SERPL-MCNC: 16.3 NG/ML (ref 30–400)
GLUCOSE SERPL-MCNC: 93 MG/DL (ref 65–99)
HCT VFR BLD AUTO: 47.1 % (ref 37.5–51)
HGB BLD-MCNC: 14.1 G/DL (ref 13–17.7)
HYPOCHROMIA BLD QL: NORMAL
IMM GRANULOCYTES # BLD AUTO: 0.11 10*3/MM3 (ref 0–0.05)
IMM GRANULOCYTES NFR BLD AUTO: 0.9 % (ref 0–0.5)
IRON 24H UR-MRATE: 26 MCG/DL (ref 59–158)
IRON SATN MFR SERPL: 5 % (ref 20–50)
LYMPHOCYTES # BLD AUTO: 1.18 10*3/MM3 (ref 0.7–3.1)
LYMPHOCYTES NFR BLD AUTO: 9.5 % (ref 19.6–45.3)
MCH RBC QN AUTO: 22 PG (ref 26.6–33)
MCHC RBC AUTO-ENTMCNC: 29.9 G/DL (ref 31.5–35.7)
MCV RBC AUTO: 73.6 FL (ref 79–97)
MONOCYTES # BLD AUTO: 1.52 10*3/MM3 (ref 0.1–0.9)
MONOCYTES NFR BLD AUTO: 12.2 % (ref 5–12)
NEUTROPHILS NFR BLD AUTO: 75.2 % (ref 42.7–76)
NEUTROPHILS NFR BLD AUTO: 9.39 10*3/MM3 (ref 1.7–7)
NRBC BLD AUTO-RTO: 0 /100 WBC (ref 0–0.2)
PLAT MORPH BLD: NORMAL
PLATELET # BLD AUTO: 616 10*3/MM3 (ref 140–450)
PMV BLD AUTO: 10.3 FL (ref 6–12)
POTASSIUM SERPL-SCNC: 5.4 MMOL/L (ref 3.5–5.2)
RBC # BLD AUTO: 6.4 10*6/MM3 (ref 4.14–5.8)
SODIUM SERPL-SCNC: 140 MMOL/L (ref 134–144)
TARGETS BLD QL SMEAR: NORMAL
TIBC SERPL-MCNC: 511 MCG/DL (ref 298–536)
TRANSFERRIN SERPL-MCNC: 343 MG/DL (ref 200–360)
WBC MORPH BLD: NORMAL
WBC NRBC COR # BLD: 12.47 10*3/MM3 (ref 3.4–10.8)

## 2022-08-16 PROCEDURE — 83540 ASSAY OF IRON: CPT | Performed by: INTERNAL MEDICINE

## 2022-08-16 PROCEDURE — 36415 COLL VENOUS BLD VENIPUNCTURE: CPT

## 2022-08-16 PROCEDURE — 82728 ASSAY OF FERRITIN: CPT | Performed by: INTERNAL MEDICINE

## 2022-08-16 PROCEDURE — 85007 BL SMEAR W/DIFF WBC COUNT: CPT | Performed by: INTERNAL MEDICINE

## 2022-08-16 PROCEDURE — 85025 COMPLETE CBC W/AUTO DIFF WBC: CPT | Performed by: INTERNAL MEDICINE

## 2022-08-16 PROCEDURE — 99205 OFFICE O/P NEW HI 60 MIN: CPT | Performed by: INTERNAL MEDICINE

## 2022-08-16 PROCEDURE — 84466 ASSAY OF TRANSFERRIN: CPT | Performed by: INTERNAL MEDICINE

## 2022-08-16 RX ORDER — DOXYCYCLINE HYCLATE 50 MG/1
324 CAPSULE, GELATIN COATED ORAL
Qty: 30 TABLET | Refills: 2 | Status: SHIPPED | OUTPATIENT
Start: 2022-08-16 | End: 2023-01-12

## 2022-08-16 NOTE — TELEPHONE ENCOUNTER
Called pt and spoke with wife (ok per ARPAN) and advised of Yoselin SCHILLING's note.  She  verbalized understanding.    [facility] msg sent to TONIE Wyatt to cosign.

## 2022-08-16 NOTE — TELEPHONE ENCOUNTER
----- Message from MY Cano sent at 8/15/2022 12:50 PM EDT -----  Please inform the patient that his MRCP shows stable pancreatic lesion.  This is reassuring.  Recommend repeating MRCP in 6 months.

## 2022-08-16 NOTE — PROGRESS NOTES
Subjective Patient noting chronic back pain, unchanged performance status    REASON FOR CONSULTATION: Thrombocytosis  Provide an opinion on any further workup or treatment                             REQUESTING PHYSICIAN: Damian Sanchez MD    RECORDS OBTAINED:  Records of the patients history including those obtained from the referring provider were reviewed and summarized in detail.    HISTORY OF PRESENT ILLNESS:  The patient is a 76 y.o. year old male who is here for an opinion about the above issue.    History of Present Illness   The patient is a 76-year-old male followed with a number of issues including coronary artery disease, status post CABG, atrial flutter, previous CVA hyperlipidemia, GE reflux, carotid vascular disease, and hypertension.  He had been seen by vascular 2/25/2022 with mild progression of carotid disease but otherwise stable and also had follow-up with pain management for back pain 3/28/2022 requiring chronic narcotic therapy.  Patient has been resistant to epidural like procedures.  Unfortunately has had concurrent constipation requiring laxatives and additional opioid antagonist to reduce GI hypomotility.  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  Patient recently reviewed again by pain management with worsening pain.  Patient also saw GI periodically undergoing a follow-up MRI of the abdomen 8/9/2022 with scattered pancreatic cystic lesions unchanged from previous.  He had previously undergone EGD and colonoscopy 11/5/2021 with irregular Z-line and biopsies taken in nonbleeding internal hemorrhoids found during retroflexion that were small.  There are scattered small and large mouth diverticula found in the sigmoid colon descending colon and splenic flexure.    The patient now presents for thrombocytosis with review of his  record over the last year demonstrating a platelet count that is escalated from 3-400,000 now to 8/11/2022 648,000 but concurrent microcytic and hypochromic indices.  These indices have been slowly reducing over the last 2 years approximately followed more closely.    These findings are discussed with the patient 8/16/2022 who indicates that he actually feels about the same though still has issues with back pain.  He is noted no change in bowel habit including, fortunately, improvement of his constipation without the use of additional medication such as Movantik.  He has not noted any change in the color of his stool including dark stools or any blood per rectum, urine though he does note periodic excess bruising from his anticoagulation therapy.    Past Medical History:   Diagnosis Date   • Anxiety    • Arthritis    • Atrial flutter (HCC)     Status post cavotricuspid isthmus ablation by Dr. Gustafson on 4/18/17   • Mandel esophagus    • Benign essential hypertension    • CAD (coronary artery disease)     3 vessel CABG 4/11/17 by Dr. Cortez: ROSARIO-prox LAD, SVG-OM1, SVG-OM3   • Carotid artery disease (HCC)     Status post carotid endarterectomy - USG 4/10/17: 50-59% NICHELLE, 1-15% LICA.    • Colonic polyp    • Cyst of pancreas    • DDD (degenerative disc disease), lumbosacral    • GERD (gastroesophageal reflux disease)    • H/O bone density study 2013   • H/O complete eye exam 2014   • History of kidney stone    • HLD (hyperlipidemia)    • Hypertension    • Lipid screening 05/31/2013   • Low back pain     [facility]   • Screening for prostate cancer 07/07/2015   • Stroke (HCC)         Past Surgical History:   Procedure Laterality Date   • CARDIAC CATHETERIZATION N/A 04/10/2017    Procedure: Left Heart Cath;  Surgeon: Marjorie Healy MD;  Location: [facility] CATH INVASIVE LOCATION;  Service:    • CARDIAC CATHETERIZATION N/A 04/10/2017    Procedure: Coronary angiography;  Surgeon: Marjorie  MD Monet;  Location: [facility] CATH INVASIVE LOCATION;  Service:    • CARDIAC CATHETERIZATION N/A 04/10/2017    Procedure: Left ventriculography;  Surgeon: Marjorie Healy MD;  Location: [facility] CATH INVASIVE LOCATION;  Service:    • CARDIAC CATHETERIZATION  2011   • CARDIAC ELECTROPHYSIOLOGY PROCEDURE N/A 04/18/2017    Procedure: Ablation atrial flutter;  Surgeon: Jose Antonio Gustafson MD;  Location: [facility] CATH INVASIVE LOCATION;  Service:    • CAROTID ENDARTERECTOMY     • CHOLECYSTECTOMY     • CHOLECYSTECTOMY WITH INTRAOPERATIVE CHOLANGIOGRAM N/A 09/07/2019    Procedure: Laparoscopic cholecystectomy with intraoperative cholangiogram;  Surgeon: Gauri Travis MD;  Location: [facility] OR;  Service: General   • COLONOSCOPY  01/06/2015    Diverticulosis, one TA   • COLONOSCOPY N/A 02/14/2019    tics, NBIH, adenomatous polyp x 2   • COLONOSCOPY N/A 11/05/2021    Procedure: COLONOSCOPY TO CECUM AND TERM. ILEUM WITH COLD POLYPECTOMIES;  Surgeon: Everton Abel MD;  Location: [facility] ENDOSCOPY;  Service: Gastroenterology;  Laterality: N/A;  PRE OP - PERS H/O POLYPS  POST OP - COLON POLYPS,, DIVERTICULOSIS, HEMORRHOIDS   • CORONARY ARTERY BYPASS GRAFT N/A 04/11/2017    Procedure: AR STERNOTOMY CORONARY ARTERY BYPASS GRAFT TIMES 3 USING LEFT INTERNAL MAMMARY ARTERY AND LEFT GREATER SAPHENOUS VEIN GRAFT PER ENDOSCOPIC VEIN HARVESTING AND PRP ;  Surgeon: Temo Cortez MD;  Location: [facility] OR;  Service:    • ENDOSCOPY  01/06/2015    HH, Ervin's esophagus   • ENDOSCOPY N/A 02/14/2019    Z line irregular, HH, Ervin's esophagus   • ENDOSCOPY N/A 11/05/2021    Procedure: ESOPHAGOGASTRODUODENOSCOPY WITH BIOPSIES;  Surgeon: Everton Abel MD;  Location: [facility] ENDOSCOPY;  Service: Gastroenterology;  Laterality: N/A;  PRE OP - PERS H/O ERVIN'S  POST OP - IRREG Z LINE   • KNEE SURGERY Left    • VASECTOMY          Current Outpatient Medications on File Prior to Visit   Medication Sig Dispense Refill   • amLODIPine  (NORVASC) 2.5 MG tablet TAKE ONE TABLET BY MOUTH DAILY 30 tablet 11   • apixaban (ELIQUIS) 5 MG tablet tablet Take 1 tablet by mouth 2 (Two) Times a Day. Lot # [id number]  Exp 10/23 28 tablet 0   • aspirin (aspirin) 81 MG EC tablet Take 1 tablet by mouth Daily. 30 tablet 0   • HYDROcodone-acetaminophen (NORCO)  MG per tablet Take 1 tablet by mouth Every 6 (Six) Hours As Needed for Moderate Pain . Take at least 6 hours apart. Max 2/day. 30 day supply 60 tablet 0   • Myrbetriq 25 MG tablet sustained-release 24 hour 24 hr tablet Take 25 mg by mouth Daily.     • Naloxegol Oxalate (Movantik) 25 MG tablet Take 1 tablet by mouth Every Morning. 30 tablet 0   • nitroglycerin (NITROSTAT) 0.4 MG SL tablet Place 1 tablet under the tongue Every 5 (Five) Minutes As Needed for Chest Pain. Take no more than 3 doses in 15 minutes. 20 tablet 2   • pantoprazole (PROTONIX) 40 MG EC tablet Take 1 tablet by mouth Daily. 90 tablet 1   • ramipril (ALTACE) 5 MG capsule Take 1 capsule by mouth Daily. 90 capsule 1   • rosuvastatin (Crestor) 20 MG tablet Take 1 tablet by mouth Daily. 90 tablet 1   • sucralfate (Carafate) 1 GM/10ML suspension Take 10 mL by mouth 2 (Two) Times a Day. 420 mL 1   • aluminum-magnesium hydroxide-simethicone (MAALOX MAX) 400-400-40 MG/5ML suspension Take 20 mL by mouth Every 6 (Six) Hours As Needed for Indigestion or Heartburn.     • mupirocin (BACTROBAN) 2 % cream Apply 1 application topically to the appropriate area as directed 2 (Two) Times a Day. 15 g 0     No current facility-administered medications on file prior to visit.        ALLERGIES:    Allergies   Allergen Reactions   • Atorvastatin Myalgia     Myalgia   • Penicillins Hives, Swelling and Rash        Social History     Socioeconomic History   • Marital status:      Spouse name: Brooke   Tobacco Use   • Smoking status: Former Smoker     Packs/day: 1.00     Types: Cigarettes     Start date: 1/1/1959     Quit date: 1/1/2009     Years since  "quittin.6   • Smokeless tobacco: Never Used   • Tobacco comment: CAFFEINE USE   Vaping Use   • Vaping Use: Never used   Substance and Sexual Activity   • Alcohol use: Yes     Comment: rarely   • Drug use: No   • Sexual activity: Yes     Partners: Female        Family History   Problem Relation Age of Onset   • Hypertension Mother    • Heart disease Mother    • Heart attack Mother    • Stroke Mother    • Heart disease Father    • Heart attack Father    • Stroke Father    • Hypertension Sister    • Heart attack Brother    • Heart disease Brother    • No Known Problems Brother    • Heart disease Brother    • Heart attack Brother    • Diabetes Brother    • No Known Problems Maternal Grandmother    • No Known Problems Maternal Grandfather    • No Known Problems Paternal Grandmother    • No Known Problems Paternal Grandfather    • Pancreatic cancer Paternal Cousin    • Arthritis Other    • Diabetes Other    • Hypertension Other    • Kidney disease Other         stones        Review of Systems   Constitutional: Negative for activity change, appetite change, fatigue and unexpected weight change.   HENT: Positive for dental problem (Edentulous, mouth irritation from dentures).    Eyes: Negative.    Respiratory: Negative.    Cardiovascular: Negative.    Gastrointestinal: Negative.    Endocrine: Negative.    Genitourinary: Negative.    Musculoskeletal: Positive for arthralgias and back pain.   Neurological: Positive for dizziness (Unstable gait and ambulation after previous CVA) and weakness.        Objective     Vitals:    22 1121   BP: 161/80   Pulse: 77   Resp: 16   Temp: 97.3 °F (36.3 °C)   TempSrc: Temporal   SpO2: 98%   Weight: 77.1 kg (170 lb)   Height: 172.7 cm (68\")   PainSc: 0-No pain     Current Status 2022   ECOG score 0       Physical Exam  Constitutional:       Appearance: Normal appearance.   HENT:      Head: Normocephalic and atraumatic.      Nose: Nose normal.      Mouth/Throat:      Mouth: " Mucous membranes are moist.      Pharynx: Oropharynx is clear.      Comments: Edentulous, generalized gum irritation noted  Eyes:      Conjunctiva/sclera: Conjunctivae normal.      Pupils: Pupils are equal, round, and reactive to light.   Cardiovascular:      Rate and Rhythm: Normal rate and regular rhythm.      Pulses: Normal pulses.      Heart sounds: Normal heart sounds.   Pulmonary:      Effort: Pulmonary effort is normal.      Breath sounds: Normal breath sounds.   Abdominal:      General: Bowel sounds are normal.      Palpations: Abdomen is soft.   Musculoskeletal:         General: Normal range of motion.   Skin:     General: Skin is dry.   Neurological:      General: No focal deficit present.      Mental Status: He is alert.      Motor: Weakness present.      Coordination: Coordination abnormal.      Gait: Gait abnormal.   Psychiatric:         Mood and Affect: Mood normal.          RECENT LABS:  Hematology WBC   Date Value Ref Range Status   08/16/2022 12.47 (H) 3.40 - 10.80 10*3/mm3 Final   08/11/2022 10.6 3.4 - 10.8 x10E3/uL Final     RBC   Date Value Ref Range Status   08/16/2022 6.40 (H) 4.14 - 5.80 10*6/mm3 Final   08/11/2022 6.62 (H) 4.14 - 5.80 x10E6/uL Final     Hemoglobin   Date Value Ref Range Status   08/16/2022 14.1 13.0 - 17.7 g/dL Final     Hematocrit   Date Value Ref Range Status   08/16/2022 47.1 37.5 - 51.0 % Final     Platelets   Date Value Ref Range Status   08/16/2022 616 (H) 140 - 450 10*3/mm3 Final          Assessment & Plan      76-year-old male with medical issues including coronary artery disease, status post CABG, atrial flutter status post ablation, previous CVA, hyperlipidemia, GE reflux, cardiovascular disease and hypertension.  He also has chronic back pain followed by pain management requiring chronic narcotic therapy.  His medical issues per tickly cardiovascular and CVA led to eventual placement on aspirin and Eliquis chronically.  He has additionally been seen by GI including  review of scattered cystic lesions in the pancreas and EGD colonoscopy as recently as November 2021.      The patient now presents for thrombocytosis with review of his record over the last year demonstrating a platelet count that is escalated from 3-400,000 now to 8/11/2022 648,000 but concurrent microcytic and hypochromic indices.  These indices have been slowly reducing over the last 2 years approximately followed more closely.  Concurrently is a mild drop in his baseline hemoglobin still well within normal limits.    These issues are discussed with the patient in some detail 8/16/2022 with this thrombocytosis thought to be related to iron deficiency.  This issupported during his visit with ferritin found to be 16.3 and iron of 26 with 5% saturation and TIBC 511.  We should first moved to replace his iron stores with his platelet count likely to reflect this reconstitution.  If he has continued evidence of iron deficiency despite this or is intolerant to oral iron (noting his history with constipation) then we would need to move to intravenous iron therapy and possibly further GI assessment with capsule endoscopy.    Plan:  *Ferrous gluconate (hopefully better tolerated) E scribed to pharmacy at 1 to 2 tablets daily as tolerated    *Reassessment 5 weeks for iron status and repeat CBC including platelet count    *6 weeks MD, possible IV iron- Injectafer if needed    *Patient agreeable this plan and follow-up    I spent 65 minutes caring for Madhu on this date of service. This time includes time spent by me in the following activities: preparing for the visit, reviewing tests, obtaining and/or reviewing a separately obtained history, performing a medically appropriate examination and/or evaluation, counseling and educating the patient/family/caregiver, ordering medications, tests, or procedures, referring and communicating with other health care professionals, documenting information in the medical record,  independently interpreting results and communicating that information with the patient/family/caregiver and care coordination.

## 2022-08-16 NOTE — TELEPHONE ENCOUNTER
Caller: Brooke Santoro    Relationship to patient: Emergency Contact    Best call back number: [phone]    Patient is needing: PT WIFE INQUIRING WHY HER  HAS NOT BEEN CONTACTED TO GO OVER HIS TEST RESULTS DONE ON THE 9TH. IN AN ATTEMPT TO DE-ESCALATE I ADVISED PT THAT FROM WHAT I COULD SEE- THE RESULTS WEREN'T SIGNED UNTIL END OF DAY 8/11, WHICH MEANS EARLIEST WE MAY HAVE GOTTEN THEM WAS 8/12 WHICH WAS A Friday,  ASSURING PATIENT WE ARE NOT JUST IGNORING RESULTS, ETC. PT WIFE SEEMED TO UNDERSTAND BUT WOULD REALLY LIKE A CALL BACK TODAY, MAY BE UPSET IF NOT ABLE TO SPEAK WITH SOMEONE TODAY.

## 2022-08-22 ENCOUNTER — APPOINTMENT (OUTPATIENT)
Dept: CT IMAGING | Facility: HOSPITAL | Age: 77
End: 2022-08-22

## 2022-08-22 ENCOUNTER — HOSPITAL ENCOUNTER (INPATIENT)
Facility: HOSPITAL | Age: 77
LOS: 2 days | Discharge: HOME | End: 2022-08-24
Attending: EMERGENCY MEDICINE | Admitting: HOSPITALIST

## 2022-08-22 DIAGNOSIS — N20.1 LEFT URETERAL STONE: Primary | ICD-10-CM

## 2022-08-22 DIAGNOSIS — R10.9 LEFT FLANK PAIN: ICD-10-CM

## 2022-08-22 PROBLEM — L98.9 FACIAL SKIN LESION: Status: ACTIVE | Noted: 2022-08-22

## 2022-08-22 PROBLEM — Z79.01 CHRONIC ANTICOAGULATION: Status: ACTIVE | Noted: 2022-08-22

## 2022-08-22 PROBLEM — N13.9 OBSTRUCTIVE UROPATHY: Status: ACTIVE | Noted: 2022-08-22

## 2022-08-22 LAB
ALBUMIN SERPL-MCNC: 4.1 G/DL (ref 3.5–5.2)
ALBUMIN/GLOB SERPL: 1.6 G/DL
ALP SERPL-CCNC: 106 U/L (ref 39–117)
ALT SERPL W P-5'-P-CCNC: 11 U/L (ref 1–41)
ANION GAP SERPL CALCULATED.3IONS-SCNC: 9 MMOL/L (ref 5–15)
ANISOCYTOSIS BLD QL: ABNORMAL
AST SERPL-CCNC: 22 U/L (ref 1–40)
BACTERIA UR QL AUTO: ABNORMAL /HPF
BASOPHILS # BLD AUTO: 0.07 10*3/MM3 (ref 0–0.2)
BASOPHILS NFR BLD AUTO: 0.4 % (ref 0–1.5)
BILIRUB SERPL-MCNC: 0.8 MG/DL (ref 0–1.2)
BILIRUB UR QL STRIP: NEGATIVE
BUN SERPL-MCNC: 18 MG/DL (ref 8–23)
BUN/CREAT SERPL: 14.9 (ref 7–25)
BURR CELLS BLD QL SMEAR: ABNORMAL
CALCIUM SPEC-SCNC: 9.5 MG/DL (ref 8.6–10.5)
CHLORIDE SERPL-SCNC: 104 MMOL/L (ref 98–107)
CLARITY UR: ABNORMAL
CO2 SERPL-SCNC: 27 MMOL/L (ref 22–29)
COLOR UR: ABNORMAL
CREAT SERPL-MCNC: 1.21 MG/DL (ref 0.76–1.27)
DEPRECATED RDW RBC AUTO: 41.6 FL (ref 37–54)
DEPRECATED RDW RBC AUTO: 46.1 FL (ref 37–54)
EGFRCR SERPLBLD CKD-EPI 2021: 61.7 ML/MIN/1.73
EOSINOPHIL # BLD AUTO: 0.2 10*3/MM3 (ref 0–0.4)
EOSINOPHIL NFR BLD AUTO: 1.2 % (ref 0.3–6.2)
ERYTHROCYTE [DISTWIDTH] IN BLOOD BY AUTOMATED COUNT: 18.3 % (ref 12.3–15.4)
ERYTHROCYTE [DISTWIDTH] IN BLOOD BY AUTOMATED COUNT: 18.6 % (ref 12.3–15.4)
GLOBULIN UR ELPH-MCNC: 2.5 GM/DL
GLUCOSE SERPL-MCNC: 117 MG/DL (ref 65–99)
GLUCOSE UR STRIP-MCNC: NEGATIVE MG/DL
HCT VFR BLD AUTO: 43.7 % (ref 37.5–51)
HCT VFR BLD AUTO: 49.8 % (ref 37.5–51)
HGB BLD-MCNC: 13.1 G/DL (ref 13–17.7)
HGB BLD-MCNC: 14.4 G/DL (ref 13–17.7)
HGB UR QL STRIP.AUTO: ABNORMAL
HYALINE CASTS UR QL AUTO: ABNORMAL /LPF
IMM GRANULOCYTES # BLD AUTO: 0.2 10*3/MM3 (ref 0–0.05)
IMM GRANULOCYTES NFR BLD AUTO: 1.2 % (ref 0–0.5)
KETONES UR QL STRIP: ABNORMAL
LEUKOCYTE ESTERASE UR QL STRIP.AUTO: ABNORMAL
LYMPHOCYTES # BLD AUTO: 0.99 10*3/MM3 (ref 0.7–3.1)
LYMPHOCYTES # BLD MANUAL: 0.15 10*3/MM3 (ref 0.7–3.1)
LYMPHOCYTES NFR BLD AUTO: 6.1 % (ref 19.6–45.3)
LYMPHOCYTES NFR BLD MANUAL: 15 % (ref 5–12)
MCH RBC QN AUTO: 21.3 PG (ref 26.6–33)
MCH RBC QN AUTO: 21.9 PG (ref 26.6–33)
MCHC RBC AUTO-ENTMCNC: 28.9 G/DL (ref 31.5–35.7)
MCHC RBC AUTO-ENTMCNC: 30 G/DL (ref 31.5–35.7)
MCV RBC AUTO: 71.2 FL (ref 79–97)
MCV RBC AUTO: 75.6 FL (ref 79–97)
MICROCYTES BLD QL: ABNORMAL
MONOCYTES # BLD AUTO: 1.87 10*3/MM3 (ref 0.1–0.9)
MONOCYTES # BLD: 2.24 10*3/MM3 (ref 0.1–0.9)
MONOCYTES NFR BLD AUTO: 11.5 % (ref 5–12)
NEUTROPHILS # BLD AUTO: 12.52 10*3/MM3 (ref 1.7–7)
NEUTROPHILS NFR BLD AUTO: 12.96 10*3/MM3 (ref 1.7–7)
NEUTROPHILS NFR BLD AUTO: 79.6 % (ref 42.7–76)
NEUTROPHILS NFR BLD MANUAL: 84 % (ref 42.7–76)
NITRITE UR QL STRIP: NEGATIVE
NRBC BLD AUTO-RTO: 0 /100 WBC (ref 0–0.2)
PH UR STRIP.AUTO: <=5 [PH] (ref 5–8)
PLAT MORPH BLD: NORMAL
PLATELET # BLD AUTO: 547 10*3/MM3 (ref 140–450)
PLATELET # BLD AUTO: 581 10*3/MM3 (ref 140–450)
PMV BLD AUTO: 10.2 FL (ref 6–12)
PMV BLD AUTO: 9.6 FL (ref 6–12)
POIKILOCYTOSIS BLD QL SMEAR: ABNORMAL
POTASSIUM SERPL-SCNC: 4.3 MMOL/L (ref 3.5–5.2)
PROT SERPL-MCNC: 6.6 G/DL (ref 6–8.5)
PROT UR QL STRIP: ABNORMAL
RBC # BLD AUTO: 6.14 10*6/MM3 (ref 4.14–5.8)
RBC # BLD AUTO: 6.59 10*6/MM3 (ref 4.14–5.8)
RBC # UR STRIP: ABNORMAL /HPF
REF LAB TEST METHOD: ABNORMAL
SARS-COV-2 RNA PNL SPEC NAA+PROBE: NOT DETECTED
SODIUM SERPL-SCNC: 140 MMOL/L (ref 136–145)
SP GR UR STRIP: 1.02 (ref 1–1.03)
SQUAMOUS #/AREA URNS HPF: ABNORMAL /HPF
UROBILINOGEN UR QL STRIP: ABNORMAL
VARIANT LYMPHS NFR BLD MANUAL: 1 % (ref 19.6–45.3)
WBC # UR STRIP: ABNORMAL /HPF
WBC MORPH BLD: NORMAL
WBC NRBC COR # BLD: 14.9 10*3/MM3 (ref 3.4–10.8)
WBC NRBC COR # BLD: 16.29 10*3/MM3 (ref 3.4–10.8)

## 2022-08-22 PROCEDURE — 81001 URINALYSIS AUTO W/SCOPE: CPT | Performed by: PHYSICIAN ASSISTANT

## 2022-08-22 PROCEDURE — 85007 BL SMEAR W/DIFF WBC COUNT: CPT | Performed by: UROLOGY

## 2022-08-22 PROCEDURE — 25010000002 ONDANSETRON PER 1 MG: Performed by: PHYSICIAN ASSISTANT

## 2022-08-22 PROCEDURE — 25010000002 HYDROMORPHONE 1 MG/ML SOLUTION: Performed by: EMERGENCY MEDICINE

## 2022-08-22 PROCEDURE — 85025 COMPLETE CBC W/AUTO DIFF WBC: CPT | Performed by: PHYSICIAN ASSISTANT

## 2022-08-22 PROCEDURE — 80053 COMPREHEN METABOLIC PANEL: CPT | Performed by: PHYSICIAN ASSISTANT

## 2022-08-22 PROCEDURE — 25010000002 IOPAMIDOL 61 % SOLUTION: Performed by: EMERGENCY MEDICINE

## 2022-08-22 PROCEDURE — 25010000002 MORPHINE PER 10 MG: Performed by: EMERGENCY MEDICINE

## 2022-08-22 PROCEDURE — 74177 CT ABD & PELVIS W/CONTRAST: CPT

## 2022-08-22 PROCEDURE — 25010000002 MORPHINE PER 10 MG: Performed by: NURSE PRACTITIONER

## 2022-08-22 PROCEDURE — 85025 COMPLETE CBC W/AUTO DIFF WBC: CPT | Performed by: UROLOGY

## 2022-08-22 PROCEDURE — 0 HYDROMORPHONE HCL PF 50 MG/5ML SOLUTION: Performed by: UROLOGY

## 2022-08-22 PROCEDURE — 25010000002 LEVOFLOXACIN PER 250 MG: Performed by: UROLOGY

## 2022-08-22 PROCEDURE — 99285 EMERGENCY DEPT VISIT HI MDM: CPT

## 2022-08-22 PROCEDURE — 87635 SARS-COV-2 COVID-19 AMP PRB: CPT | Performed by: PHYSICIAN ASSISTANT

## 2022-08-22 RX ORDER — AMOXICILLIN 250 MG
2 CAPSULE ORAL 2 TIMES DAILY
Status: DISCONTINUED | OUTPATIENT
Start: 2022-08-22 | End: 2022-08-24 | Stop reason: HOSPADM

## 2022-08-22 RX ORDER — SODIUM CHLORIDE 9 MG/ML
125 INJECTION, SOLUTION INTRAVENOUS CONTINUOUS
Status: DISCONTINUED | OUTPATIENT
Start: 2022-08-22 | End: 2022-08-23

## 2022-08-22 RX ORDER — HYDROCODONE BITARTRATE AND ACETAMINOPHEN 5; 325 MG/1; MG/1
1 TABLET ORAL EVERY 6 HOURS PRN
Status: DISCONTINUED | OUTPATIENT
Start: 2022-08-22 | End: 2022-08-22

## 2022-08-22 RX ORDER — HYDROMORPHONE HYDROCHLORIDE 1 MG/ML
0.5 INJECTION, SOLUTION INTRAMUSCULAR; INTRAVENOUS; SUBCUTANEOUS ONCE
Status: DISCONTINUED | OUTPATIENT
Start: 2022-08-22 | End: 2022-08-22

## 2022-08-22 RX ORDER — PANTOPRAZOLE SODIUM 40 MG/1
40 TABLET, DELAYED RELEASE ORAL DAILY
Status: DISCONTINUED | OUTPATIENT
Start: 2022-08-22 | End: 2022-08-24 | Stop reason: HOSPADM

## 2022-08-22 RX ORDER — POLYETHYLENE GLYCOL 3350 17 G/17G
17 POWDER, FOR SOLUTION ORAL DAILY PRN
Status: DISCONTINUED | OUTPATIENT
Start: 2022-08-22 | End: 2022-08-24 | Stop reason: HOSPADM

## 2022-08-22 RX ORDER — NALOXONE HCL 0.4 MG/ML
0.1 VIAL (ML) INJECTION
Status: DISCONTINUED | OUTPATIENT
Start: 2022-08-22 | End: 2022-08-24 | Stop reason: HOSPADM

## 2022-08-22 RX ORDER — MORPHINE SULFATE 2 MG/ML
2 INJECTION, SOLUTION INTRAMUSCULAR; INTRAVENOUS ONCE AS NEEDED
Status: COMPLETED | OUTPATIENT
Start: 2022-08-22 | End: 2022-08-22

## 2022-08-22 RX ORDER — BISACODYL 10 MG
10 SUPPOSITORY, RECTAL RECTAL DAILY PRN
Status: DISCONTINUED | OUTPATIENT
Start: 2022-08-22 | End: 2022-08-24 | Stop reason: HOSPADM

## 2022-08-22 RX ORDER — SODIUM CHLORIDE 0.9 % (FLUSH) 0.9 %
10 SYRINGE (ML) INJECTION EVERY 12 HOURS SCHEDULED
Status: DISCONTINUED | OUTPATIENT
Start: 2022-08-22 | End: 2022-08-24 | Stop reason: HOSPADM

## 2022-08-22 RX ORDER — SODIUM CHLORIDE 9 MG/ML
100 INJECTION, SOLUTION INTRAVENOUS CONTINUOUS
Status: DISCONTINUED | OUTPATIENT
Start: 2022-08-22 | End: 2022-08-23

## 2022-08-22 RX ORDER — HYDROMORPHONE HCL IN 0.9% NACL 10 MG/50ML
PATIENT CONTROLLED ANALGESIA SYRINGE INTRAVENOUS CONTINUOUS
Status: DISCONTINUED | OUTPATIENT
Start: 2022-08-22 | End: 2022-08-23

## 2022-08-22 RX ORDER — MORPHINE SULFATE 2 MG/ML
4 INJECTION, SOLUTION INTRAMUSCULAR; INTRAVENOUS ONCE
Status: COMPLETED | OUTPATIENT
Start: 2022-08-22 | End: 2022-08-22

## 2022-08-22 RX ORDER — AMLODIPINE BESYLATE 2.5 MG/1
2.5 TABLET ORAL DAILY
Status: DISCONTINUED | OUTPATIENT
Start: 2022-08-22 | End: 2022-08-24 | Stop reason: HOSPADM

## 2022-08-22 RX ORDER — MORPHINE SULFATE 2 MG/ML
2 INJECTION, SOLUTION INTRAMUSCULAR; INTRAVENOUS
Status: DISCONTINUED | OUTPATIENT
Start: 2022-08-22 | End: 2022-08-22

## 2022-08-22 RX ORDER — LEVOFLOXACIN 5 MG/ML
500 INJECTION, SOLUTION INTRAVENOUS EVERY 24 HOURS
Status: DISCONTINUED | OUTPATIENT
Start: 2022-08-22 | End: 2022-08-24 | Stop reason: HOSPADM

## 2022-08-22 RX ORDER — SUCRALFATE 1 G/1
1 TABLET ORAL
Refills: 1 | Status: DISCONTINUED | OUTPATIENT
Start: 2022-08-22 | End: 2022-08-22

## 2022-08-22 RX ORDER — TAMSULOSIN HYDROCHLORIDE 0.4 MG/1
0.4 CAPSULE ORAL DAILY
Status: DISCONTINUED | OUTPATIENT
Start: 2022-08-22 | End: 2022-08-24 | Stop reason: HOSPADM

## 2022-08-22 RX ORDER — NITROGLYCERIN 0.4 MG/1
0.4 TABLET SUBLINGUAL
Status: DISCONTINUED | OUTPATIENT
Start: 2022-08-22 | End: 2022-08-24 | Stop reason: HOSPADM

## 2022-08-22 RX ORDER — ONDANSETRON 2 MG/ML
4 INJECTION INTRAMUSCULAR; INTRAVENOUS EVERY 6 HOURS PRN
Status: DISCONTINUED | OUTPATIENT
Start: 2022-08-22 | End: 2022-08-24 | Stop reason: HOSPADM

## 2022-08-22 RX ORDER — ONDANSETRON 2 MG/ML
4 INJECTION INTRAMUSCULAR; INTRAVENOUS ONCE
Status: COMPLETED | OUTPATIENT
Start: 2022-08-22 | End: 2022-08-22

## 2022-08-22 RX ORDER — BISACODYL 5 MG/1
5 TABLET, DELAYED RELEASE ORAL DAILY PRN
Status: DISCONTINUED | OUTPATIENT
Start: 2022-08-22 | End: 2022-08-24 | Stop reason: HOSPADM

## 2022-08-22 RX ORDER — RAMIPRIL 5 MG/1
5 CAPSULE ORAL DAILY
Status: DISCONTINUED | OUTPATIENT
Start: 2022-08-22 | End: 2022-08-24 | Stop reason: HOSPADM

## 2022-08-22 RX ORDER — SODIUM CHLORIDE 0.9 % (FLUSH) 0.9 %
10 SYRINGE (ML) INJECTION AS NEEDED
Status: DISCONTINUED | OUTPATIENT
Start: 2022-08-22 | End: 2022-08-24 | Stop reason: HOSPADM

## 2022-08-22 RX ORDER — ROSUVASTATIN CALCIUM 20 MG/1
20 TABLET, COATED ORAL DAILY
Status: DISCONTINUED | OUTPATIENT
Start: 2022-08-22 | End: 2022-08-24 | Stop reason: HOSPADM

## 2022-08-22 RX ORDER — ONDANSETRON 4 MG/1
4 TABLET, FILM COATED ORAL EVERY 6 HOURS PRN
Status: DISCONTINUED | OUTPATIENT
Start: 2022-08-22 | End: 2022-08-24 | Stop reason: HOSPADM

## 2022-08-22 RX ADMIN — ROSUVASTATIN CALCIUM 20 MG: 20 TABLET, FILM COATED ORAL at 14:29

## 2022-08-22 RX ADMIN — SODIUM CHLORIDE 125 ML/HR: 9 INJECTION, SOLUTION INTRAVENOUS at 14:29

## 2022-08-22 RX ADMIN — DOCUSATE SODIUM 50MG AND SENNOSIDES 8.6MG 2 TABLET: 8.6; 5 TABLET, FILM COATED ORAL at 14:29

## 2022-08-22 RX ADMIN — MORPHINE SULFATE 2 MG: 2 INJECTION, SOLUTION INTRAMUSCULAR; INTRAVENOUS at 11:32

## 2022-08-22 RX ADMIN — DOCUSATE SODIUM 50MG AND SENNOSIDES 8.6MG 2 TABLET: 8.6; 5 TABLET, FILM COATED ORAL at 21:04

## 2022-08-22 RX ADMIN — ONDANSETRON 4 MG: 2 INJECTION INTRAMUSCULAR; INTRAVENOUS at 07:07

## 2022-08-22 RX ADMIN — SODIUM CHLORIDE 100 ML/HR: 9 INJECTION, SOLUTION INTRAVENOUS at 11:36

## 2022-08-22 RX ADMIN — Medication 10 ML: at 11:36

## 2022-08-22 RX ADMIN — MORPHINE SULFATE 4 MG: 2 INJECTION, SOLUTION INTRAMUSCULAR; INTRAVENOUS at 07:07

## 2022-08-22 RX ADMIN — HYDROMORPHONE HYDROCHLORIDE: 10 INJECTION, SOLUTION INTRAMUSCULAR; INTRAVENOUS; SUBCUTANEOUS at 15:18

## 2022-08-22 RX ADMIN — LEVOFLOXACIN 500 MG: 500 INJECTION, SOLUTION INTRAVENOUS at 16:08

## 2022-08-22 RX ADMIN — HYDROMORPHONE HYDROCHLORIDE 1 MG: 1 INJECTION, SOLUTION INTRAMUSCULAR; INTRAVENOUS; SUBCUTANEOUS at 09:34

## 2022-08-22 RX ADMIN — RAMIPRIL 5 MG: 5 CAPSULE ORAL at 14:29

## 2022-08-22 RX ADMIN — TAMSULOSIN HYDROCHLORIDE 0.4 MG: 0.4 CAPSULE ORAL at 15:52

## 2022-08-22 RX ADMIN — PANTOPRAZOLE SODIUM 40 MG: 40 TABLET, DELAYED RELEASE ORAL at 14:28

## 2022-08-22 RX ADMIN — IOPAMIDOL 85 ML: 612 INJECTION, SOLUTION INTRAVENOUS at 08:14

## 2022-08-22 RX ADMIN — AMLODIPINE BESYLATE 2.5 MG: 2.5 TABLET ORAL at 14:29

## 2022-08-22 RX ADMIN — SODIUM CHLORIDE 125 ML/HR: 9 INJECTION, SOLUTION INTRAVENOUS at 19:47

## 2022-08-22 NOTE — ED PROVIDER NOTES
"The LORNA and I have discussed this patient's history, physical exam, and treatment plan.  I have reviewed the documentation and personally had a face to face interaction with the patient. I affirm the documentation and agree with the treatment and plan.  The attached note describes my personal findings.      I provided a substantive portion of the care of the patient.  I personally performed the physical exam in its entirety, and below are my findings.     Brief history of present illness: 76-year-old male presents to the ED complaining of left-sided flank pain radiating to his left lower abdomen.  Pain began yesterday evening and hurt throughout the evening.  It was severe upon arrival but after medications he feels much improved.  Patient does report a history of kidney stones.    Physical exam:    /74   Pulse 64   Temp 96.6 °F (35.9 °C) (Tympanic)   Resp 16   Ht 172.7 cm (68\")   Wt 79.4 kg (175 lb)   SpO2 95%   BMI 26.61 kg/m²       Physical Exam   Constitutional: No distress.  Nontoxic  HENT:  Head: Normocephalic and atraumatic.   Oropharynx: Mucous membranes are moist.   Eyes: . No scleral icterus. No conjunctival pallor.  Neck: Normal range of motion. Neck supple.   Cardiovascular: Pink warm and well perfused throughout.    Pulmonary/Chest: No respiratory distress.  No tachypnea or increased work of breathing appreciated.    Abdominal: Soft. There is no tenderness. There is no rebound and no guarding.   Musculoskeletal: Moves all extremities equally.    Neurological: Alert and oriented.  No acute focal deficit appreciated.  Skin: Skin is pink, warm, and dry.   Psychiatric: Mood and affect normal.   Nursing note and vitals reviewed.        MDM:    Results for orders placed or performed during the hospital encounter of 08/22/22   Comprehensive Metabolic Panel    Specimen: Blood   Result Value Ref Range    Glucose 117 (H) 65 - 99 mg/dL    BUN 18 8 - 23 mg/dL    Creatinine 1.21 0.76 - 1.27 mg/dL    " Sodium 140 136 - 145 mmol/L    Potassium 4.3 3.5 - 5.2 mmol/L    Chloride 104 98 - 107 mmol/L    CO2 27.0 22.0 - 29.0 mmol/L    Calcium 9.5 8.6 - 10.5 mg/dL    Total Protein 6.6 6.0 - 8.5 g/dL    Albumin 4.10 3.50 - 5.20 g/dL    ALT (SGPT) 11 1 - 41 U/L    AST (SGOT) 22 1 - 40 U/L    Alkaline Phosphatase 106 39 - 117 U/L    Total Bilirubin 0.8 0.0 - 1.2 mg/dL    Globulin 2.5 gm/dL    A/G Ratio 1.6 g/dL    BUN/Creatinine Ratio 14.9 7.0 - 25.0    Anion Gap 9.0 5.0 - 15.0 mmol/L    eGFR 61.7 >60.0 mL/min/1.73   Urinalysis With Microscopic If Indicated (No Culture) - Urine, Clean Catch    Specimen: Urine, Clean Catch   Result Value Ref Range    Color, UA Dark Yellow (A) Yellow, Straw    Appearance, UA Cloudy (A) Clear    pH, UA <=5.0 5.0 - 8.0    Specific Gravity, UA 1.025 1.005 - 1.030    Glucose, UA Negative Negative    Ketones, UA Trace (A) Negative    Bilirubin, UA Negative Negative    Blood, UA Large (3+) (A) Negative    Protein, UA 30 mg/dL (1+) (A) Negative    Leuk Esterase, UA Trace (A) Negative    Nitrite, UA Negative Negative    Urobilinogen, UA 1.0 E.U./dL 0.2 - 1.0 E.U./dL   CBC Auto Differential    Specimen: Blood   Result Value Ref Range    WBC 16.29 (H) 3.40 - 10.80 10*3/mm3    RBC 6.59 (H) 4.14 - 5.80 10*6/mm3    Hemoglobin 14.4 13.0 - 17.7 g/dL    Hematocrit 49.8 37.5 - 51.0 %    MCV 75.6 (L) 79.0 - 97.0 fL    MCH 21.9 (L) 26.6 - 33.0 pg    MCHC 28.9 (L) 31.5 - 35.7 g/dL    RDW 18.3 (H) 12.3 - 15.4 %    RDW-SD 46.1 37.0 - 54.0 fl    MPV 9.6 6.0 - 12.0 fL    Platelets 581 (H) 140 - 450 10*3/mm3    Neutrophil % 79.6 (H) 42.7 - 76.0 %    Lymphocyte % 6.1 (L) 19.6 - 45.3 %    Monocyte % 11.5 5.0 - 12.0 %    Eosinophil % 1.2 0.3 - 6.2 %    Basophil % 0.4 0.0 - 1.5 %    Immature Grans % 1.2 (H) 0.0 - 0.5 %    Neutrophils, Absolute 12.96 (H) 1.70 - 7.00 10*3/mm3    Lymphocytes, Absolute 0.99 0.70 - 3.10 10*3/mm3    Monocytes, Absolute 1.87 (H) 0.10 - 0.90 10*3/mm3    Eosinophils, Absolute 0.20 0.00 - 0.40  10*3/mm3    Basophils, Absolute 0.07 0.00 - 0.20 10*3/mm3    Immature Grans, Absolute 0.20 (H) 0.00 - 0.05 10*3/mm3    nRBC 0.0 0.0 - 0.2 /100 WBC   Urinalysis, Microscopic Only - Urine, Clean Catch    Specimen: Urine, Clean Catch   Result Value Ref Range    RBC, UA 13-20 (A) None Seen, 0-2 /HPF    WBC, UA 0-2 None Seen, 0-2 /HPF    Bacteria, UA None Seen None Seen /HPF    Squamous Epithelial Cells, UA 0-2 None Seen, 0-2 /HPF    Hyaline Casts, UA None Seen None Seen /LPF    Methodology Automated Microscopy      CT Abdomen Pelvis With Contrast    Result Date: 8/22/2022  Narrative: CT OF THE ABDOMEN AND PELVIS WITH CONTRAST 08/22/2022  HISTORY: Left flank pain.  Axial images were obtained from the lung bases to the symphysis pubis after intravenous contrast. No oral contrast was given.  There is mild left perinephric stranding. Small exophytic left renal lesion is seen, also seen on the previous study of 02/13/2019 and probably a slightly hyperdense cyst.  There is mild-to-moderate left hydronephrosis and hydroureter down to an approximately 6 mm stone in the distal left ureter just proximal to the left ureterovesical junction.  There is a punctate nonobstructing right renal stone. No right hydronephrosis is seen. Tiny right renal cyst is seen.  There is a small hiatal hernia.  Gallbladder has been removed. There is mild compensatory biliary dilatation in the liver. The spleen appears unremarkable. There is an approximately 12 mm low-density lesion in the pancreatic head. There is some additional cystic change in the uncinate process. Please see additional dictation for the MRI of the abdomen from 08/09/2022.  There is colonic diverticulosis. Urinary bladder and prostate gland appear normal.  No bowel wall thickening or bowel dilatation is seen.      Impression: 1. Moderate obstructive uropathy on the left from an approximately 6 mm distal left ureteral stone. 2. Punctate nonobstructing right renal stone. 3. Status  post cholecystectomy. 4. Small hiatal hernia. 5. Small cystic lesions in the pancreas. Please see additional dictation for the MRI of the abdomen from 08/09/2022. 6. Colonic diverticulosis. 7. Report was discussed with the ER practitioner.  Radiation dose reduction techniques were utilized, including automated exposure control and exposure modulation based on body size.       MRI abdomen w wo contrast mrcp    Result Date: 8/11/2022  Narrative: MRI ABDOMEN WITH AND WITHOUT CONTRAST  HISTORY: Pancreatic cyst  TECHNIQUE: Multiplanar multisequence MRI of the abdomen was performed before and after the IV administration of 16 mL of MultiHance.  COMPARISON: Abdominal MRI 08/09/2021  FINDINGS: Susceptibility effect from presumed median sternotomy is included visualized.  The gallbladder is surgically absent. There is no intra-axial hepatic biliary duct dilation.  Multiple cystic lesions are scattered throughout the pancreas, as before. The largest measures 1.5 x 2.1 cm in the uncinate process, grossly unchanged since 08/09/2021. Additional smaller index lesion more superiorly within the pancreatic head measures 1.1 cm and is also grossly unchanged since 08/09/2021. The main pancreatic duct is not distended.  Exophytic subcentimeter lesion arising off the left kidney is too small to characterize, as before. The portal and hepatic veins are patent.  IMPRESSION 1.  Scattered pancreatic cystic lesions, as before, with index lesions grossly unchanged since 08/09/2021. Follow-up with abdominal MRI in 6 months is recommended per ACR criteria. 2.  Other findings as above.  This report was finalized on 8/11/2022 4:32 PM by Dr. Avel Knox M.D.             Nik, Alphonse FULLER MD  08/22/22 0786

## 2022-08-22 NOTE — CONSULTS
"[facility]  Damien Oliveira MD    Patient Care Team:  Damian Sanchez MD as PCP - General (Family Medicine)  Jr Temo Cortez MD as Surgeon (Cardiothoracic Surgery)  Netta Mayorga Jr., MD as Consulting Physician (Vascular Surgery)  Damian Sanchez MD as Referring Physician (Family Medicine)  Aquiles Lopez MD as Consulting Physician (Hematology and Oncology)    Chief complaint: Left flank pain with nausea    Subjective .     History of present illness: This 76-year-old male has a history of urinary calculi.  He developed acute onset of left flank pain yesterday at 5 PM.  He developed nausea but no vomiting.  Continues to have \"terrible\" left flank pain.    Review of Systems  All systems were reviewed and were negative except for left flank and abdominal pain.  No chest pain.  No shortness of air.    History  Past Medical History:   Diagnosis Date   • Anxiety    • Arthritis    • Atrial flutter (HCC)     Status post cavotricuspid isthmus ablation by Dr. Gustafson on 4/18/17   • Mandel esophagus    • Benign essential hypertension    • CAD (coronary artery disease)     3 vessel CABG 4/11/17 by Dr. Cortez: ROSARIO-prox LAD, SVG-OM1, SVG-OM3   • Carotid artery disease (HCC)     Status post carotid endarterectomy - USG 4/10/17: 50-59% NICHELLE, 1-15% LICA.    • Colonic polyp    • Cyst of pancreas    • DDD (degenerative disc disease), lumbosacral    • GERD (gastroesophageal reflux disease)    • H/O bone density study 2013   • H/O complete eye exam 2014   • History of kidney stone    • HLD (hyperlipidemia)    • Hypertension    • Lipid screening 05/31/2013   • Low back pain     physical therapy [facility] rehab 5-12-10   • Screening for prostate cancer 07/07/2015   • Stroke (HCC)    ,   Past Surgical History:   Procedure Laterality Date   • CARDIAC CATHETERIZATION N/A 04/10/2017    Procedure: Left Heart Cath;  Surgeon: Marjorie Healy MD;  Location: [facility] INVASIVE LOCATION;  Service:    • CARDIAC " CATHETERIZATION N/A 04/10/2017    Procedure: Coronary angiography;  Surgeon: Marjorie Healy MD;  Location: [facility] CATH INVASIVE LOCATION;  Service:    • CARDIAC CATHETERIZATION N/A 04/10/2017    Procedure: Left ventriculography;  Surgeon: Marjorie Healy MD;  Location: [facility] CATH INVASIVE LOCATION;  Service:    • CARDIAC CATHETERIZATION  2011   • CARDIAC ELECTROPHYSIOLOGY PROCEDURE N/A 04/18/2017    Procedure: Ablation atrial flutter;  Surgeon: Jose Antonio Gustafson MD;  Location: [facility] CATH INVASIVE LOCATION;  Service:    • CAROTID ENDARTERECTOMY     • CHOLECYSTECTOMY     • CHOLECYSTECTOMY WITH INTRAOPERATIVE CHOLANGIOGRAM N/A 09/07/2019    Procedure: Laparoscopic cholecystectomy with intraoperative cholangiogram;  Surgeon: Gauri Travis MD;  Location: [facility] MAIN OR;  Service: General   • COLONOSCOPY  01/06/2015    Diverticulosis, one TA   • COLONOSCOPY N/A 02/14/2019    tics, NBIH, adenomatous polyp x 2   • COLONOSCOPY N/A 11/05/2021    Procedure: COLONOSCOPY TO CECUM AND TERM. ILEUM WITH COLD POLYPECTOMIES;  Surgeon: Everton Abel MD;  Location: [facility] ENDOSCOPY;  Service: Gastroenterology;  Laterality: N/A;  PRE OP - PERS H/O POLYPS  POST OP - COLON POLYPS,, DIVERTICULOSIS, HEMORRHOIDS   • CORONARY ARTERY BYPASS GRAFT N/A 04/11/2017    Procedure: AR STERNOTOMY CORONARY ARTERY BYPASS GRAFT TIMES 3 USING LEFT INTERNAL MAMMARY ARTERY AND LEFT GREATER SAPHENOUS VEIN GRAFT PER ENDOSCOPIC VEIN HARVESTING AND PRP ;  Surgeon: Temo Cortez MD;  Location: [facility] MAIN OR;  Service:    • ENDOSCOPY  01/06/2015    HH, Ervin's esophagus   • ENDOSCOPY N/A 02/14/2019    Z line irregular, HH, Ervin's esophagus   • ENDOSCOPY N/A 11/05/2021    Procedure: ESOPHAGOGASTRODUODENOSCOPY WITH BIOPSIES;  Surgeon: Everton Abel MD;  Location: [facility] ENDOSCOPY;  Service: Gastroenterology;  Laterality: N/A;  PRE OP - PERS H/O ERVIN'S  POST OP - IRREG Z LINE   • KNEE SURGERY Left    • VASECTOMY     ,   Family History    Problem Relation Age of Onset   • Hypertension Mother    • Heart disease Mother    • Heart attack Mother    • Stroke Mother    • Heart disease Father    • Heart attack Father    • Stroke Father    • Hypertension Sister    • Heart attack Brother    • Heart disease Brother    • No Known Problems Brother    • Heart disease Brother    • Heart attack Brother    • Diabetes Brother    • No Known Problems Maternal Grandmother    • No Known Problems Maternal Grandfather    • No Known Problems Paternal Grandmother    • No Known Problems Paternal Grandfather    • Pancreatic cancer Paternal Cousin    • Arthritis Other    • Diabetes Other    • Hypertension Other    • Kidney disease Other         stones   ,   Social History     Tobacco Use   • Smoking status: Former Smoker     Packs/day: 1.00     Types: Cigarettes     Start date: 1959     Quit date: 2009     Years since quittin.6   • Smokeless tobacco: Never Used   • Tobacco comment: CAFFEINE USE   Vaping Use   • Vaping Use: Never used   Substance Use Topics   • Alcohol use: Yes     Comment: rarely   • Drug use: No   , (Not in a hospital admission)  , Scheduled Meds:   , Continuous Infusions:   , PRN Meds:  •  [COMPLETED] Insert peripheral IV **AND** sodium chloride, Allergies:  Atorvastatin and Penicillins and     Objective     Vital Signs   Temp:  [96.6 °F (35.9 °C)] 96.6 °F (35.9 °C)  Heart Rate:  [60-81] 64  Resp:  [16-18] 16  BP: (151-170)/(69-74) 157/74    Physical Exam:     General Appearance:    Alert, cooperative, in no acute distress   Head:    Normocephalic, without obvious abnormality, atraumatic   Eyes:            Lids and lashes normal, conjunctivae and sclerae normal, no   icterus, no pallor, corneas clear, PERRLA   Ears:    Ears appear intact with no abnormalities noted   Throat:   No oral lesions, no thrush, oral mucosa moist   Neck:   No adenopathy, supple, trachea midline,    Back:     No kyphosis present, no scoliosis present, no skin lesions,       erythema or scars, no tenderness to percussion or                   palpation,   range of motion normal   Lungs:     Clear to auscultation,respirations regular, even and                  unlabored    Heart:    Regular rhythm and normal rate, normal S1 and S2, no            murmur, no gallop, no rub, no click   Chest Wall:    No abnormalities observed   Abdomen:     Normal bowel sounds, no masses, no organomegaly, soft        tenderness left flank to palpation., nondistended, no guarding, no rebound                tenderness   Genital/Rectal:    Deferred   Extremities:   Moves all extremities well, no edema, no cyanosis, no             redness       Skin:   No bleeding, bruising or rash       Neurologic:   Cranial nerves 2 - 12 grossly intact, sensation intact,       Results Review:   I reviewed the patient's new clinical results.  Discussed with The patient and his wife.  I personally reviewed his CT scan.  This indicates a distal left ureteral stone with obstruction and hydronephrosis.      Assessment & Plan       * No active hospital problems. *      Distal left ureteral stone with hydronephrosis and flank pain    I discussed the patient's findings and my recommendations with patient, family, nursing staff, primary care team and We will try to get his pain under control.  I discussed ureteroscopy and laser destruction of the stone if he does not pass it.    Damien Oliveira MD  08/22/22  09:38 EDT    Time: 40 including review of x-rays, history and physical, discussion of plans

## 2022-08-22 NOTE — ED PROVIDER NOTES
EMERGENCY DEPARTMENT ENCOUNTER    Room Number:  02/02  Date seen:  8/22/2022  Time seen: 06:54 EDT  PCP: Damian Sanchez MD  Historian: patient      HPI:  Chief Complaint: flank pain    Context: Madhu Santoro is a 76 y.o. male who presents to the ED for evaluation of flank pain since yesterday afternoon.  Patient states he was told at his urologist, Dr. Rodriguez approximately 1 month ago that he did have a left kidney stone.  Patient states he has had kidney stones before in the past but has been several years ago.  He believes he had to have lithotripsy or some sort of procedure for kidney stones but again states it has been a long time ago.  He states the pain has been constant and severe in his left flank.  He states he does not feel as if he is fully emptying his bladder and feels a lot of pressure.  He denies any fever, chills, nausea, vomiting, or abdominal pain.  He denies any hematuria.  Patient is on Eliquis for history of heart disease.      PAST MEDICAL HISTORY  Active Ambulatory Problems     Diagnosis Date Noted   • Anxiety    • Arthritis    • Chronic coronary artery disease    • HLD (hyperlipidemia)    • Benign essential hypertension    • DDD (degenerative disc disease), lumbosacral    • Cerebrovascular accident (HCC)    • Encounter for screening for cardiovascular disorders 11/20/2012   • Gastroesophageal reflux disease 04/04/2016   • Hyperlipidemia 04/04/2016   • Stenosis of carotid artery 04/26/2017   • Former smoker 04/26/2017   • History of cardiac catheterization 12/16/2011   • S/P ablation of atrial flutter 04/26/2017   • Typical atrial flutter ([facility]) 04/26/2017   • S/P CABG (coronary artery bypass graft) 04/26/2017   • Adenomatous polyp of ascending colon 02/12/2019   • Abdominal bloating 02/12/2019   • Stroke ([facility]) 03/29/2019   • PAD (peripheral artery disease) ([facility]) 03/29/2019   • Acute cholecystitis 09/06/2019   • Right upper quadrant abdominal pain 09/06/2019   • Chronic pain of both hips  03/31/2021   • Chronic bilateral low back pain without sciatica 03/31/2021   • Sacroiliac joint dysfunction of both sides 03/31/2021   • Encounter for long-term (current) use of high-risk medication 03/31/2021   • Lumbar facet arthropathy 03/31/2021   • Stroke-like symptoms 04/10/2021   • CVA (cerebral vascular accident) (HCC) 04/11/2021   • Personal history of colonic polyps 07/23/2021   • Arthralgia of multiple joints 09/28/2021   • Iron deficiency 08/16/2022   • Thrombocytosis 08/16/2022     Resolved Ambulatory Problems     Diagnosis Date Noted   • No Resolved Ambulatory Problems     Past Medical History:   Diagnosis Date   • Atrial flutter (HCC)    • Mandel esophagus    • CAD (coronary artery disease)    • Carotid artery disease (HCC)    • Colonic polyp    • Cyst of pancreas    • GERD (gastroesophageal reflux disease)    • H/O bone density study 2013   • H/O complete eye exam 2014   • History of kidney stone    • Hypertension    • Lipid screening 05/31/2013   • Low back pain    • Screening for prostate cancer 07/07/2015         PAST SURGICAL HISTORY  Past Surgical History:   Procedure Laterality Date   • CARDIAC CATHETERIZATION N/A 04/10/2017    Procedure: Left Heart Cath;  Surgeon: Marjorie Healy MD;  Location:  [facility] CATH INVASIVE LOCATION;  Service:    • CARDIAC CATHETERIZATION N/A 04/10/2017    Procedure: Coronary angiography;  Surgeon: Marjorie Healy MD;  Location:  [facility] CATH INVASIVE LOCATION;  Service:    • CARDIAC CATHETERIZATION N/A 04/10/2017    Procedure: Left ventriculography;  Surgeon: Marjorie Healy MD;  Location:  [facility] CATH INVASIVE LOCATION;  Service:    • CARDIAC CATHETERIZATION  2011   • CARDIAC ELECTROPHYSIOLOGY PROCEDURE N/A 04/18/2017    Procedure: Ablation atrial flutter;  Surgeon: Jose Antonio Gustafson MD;  Location:  [facility] CATH INVASIVE LOCATION;  Service:    • CAROTID ENDARTERECTOMY     • CHOLECYSTECTOMY     • CHOLECYSTECTOMY WITH INTRAOPERATIVE CHOLANGIOGRAM N/A 09/07/2019    Procedure:  Laparoscopic cholecystectomy with intraoperative cholangiogram;  Surgeon: Gauri Travis MD;  Location: [facility] MAIN OR;  Service: General   • COLONOSCOPY  01/06/2015    Diverticulosis, one TA   • COLONOSCOPY N/A 02/14/2019    tics, NBIH, adenomatous polyp x 2   • COLONOSCOPY N/A 11/05/2021    Procedure: COLONOSCOPY TO CECUM AND TERM. ILEUM WITH COLD POLYPECTOMIES;  Surgeon: Everton Abel MD;  Location: [facility] ENDOSCOPY;  Service: Gastroenterology;  Laterality: N/A;  PRE OP - PERS H/O POLYPS  POST OP - COLON POLYPS,, DIVERTICULOSIS, HEMORRHOIDS   • CORONARY ARTERY BYPASS GRAFT N/A 04/11/2017    Procedure: AR STERNOTOMY CORONARY ARTERY BYPASS GRAFT TIMES 3 USING LEFT INTERNAL MAMMARY ARTERY AND LEFT GREATER SAPHENOUS VEIN GRAFT PER ENDOSCOPIC VEIN HARVESTING AND PRP ;  Surgeon: Temo Cortez MD;  Location: [facility] MAIN OR;  Service:    • ENDOSCOPY  01/06/2015    HH, Ervin's esophagus   • ENDOSCOPY N/A 02/14/2019    Z line irregular, HH, Ervin's esophagus   • ENDOSCOPY N/A 11/05/2021    Procedure: ESOPHAGOGASTRODUODENOSCOPY WITH BIOPSIES;  Surgeon: Everton Abel MD;  Location: [facility] ENDOSCOPY;  Service: Gastroenterology;  Laterality: N/A;  PRE OP - PERS H/O ERVIN'S  POST OP - IRREG Z LINE   • KNEE SURGERY Left    • VASECTOMY           FAMILY HISTORY  Family History   Problem Relation Age of Onset   • Hypertension Mother    • Heart disease Mother    • Heart attack Mother    • Stroke Mother    • Heart disease Father    • Heart attack Father    • Stroke Father    • Hypertension Sister    • Heart attack Brother    • Heart disease Brother    • No Known Problems Brother    • Heart disease Brother    • Heart attack Brother    • Diabetes Brother    • No Known Problems Maternal Grandmother    • No Known Problems Maternal Grandfather    • No Known Problems Paternal Grandmother    • No Known Problems Paternal Grandfather    • Pancreatic cancer Paternal Cousin    • Arthritis Other    • Diabetes Other    •  Hypertension Other    • Kidney disease Other         stones         SOCIAL HISTORY  Social History     Socioeconomic History   • Marital status:      Spouse name: Brooke   Tobacco Use   • Smoking status: Former Smoker     Packs/day: 1.00     Types: Cigarettes     Start date: 1959     Quit date: 2009     Years since quittin.6   • Smokeless tobacco: Never Used   • Tobacco comment: CAFFEINE USE   Vaping Use   • Vaping Use: Never used   Substance and Sexual Activity   • Alcohol use: Yes     Comment: rarely   • Drug use: No   • Sexual activity: Yes     Partners: Female         ALLERGIES  Atorvastatin and Penicillins        REVIEW OF SYSTEMS  Review of Systems   Constitutional: Negative for chills and fever.   Respiratory: Negative for cough and shortness of breath.    Cardiovascular: Negative for chest pain.   Gastrointestinal: Negative for abdominal pain, nausea and vomiting.   Genitourinary: Positive for difficulty urinating, flank pain and frequency. Negative for hematuria.   Musculoskeletal: Negative for gait problem.   Skin: Negative for color change.   Neurological: Negative for numbness.   Psychiatric/Behavioral: Negative for confusion.      All systems reviewed and negative except for those discussed in HPI.       PHYSICAL EXAM  ED Triage Vitals   Temp Heart Rate Resp BP SpO2   22 0542 22 0542 22 0542 22 0604 22 0542   96.6 °F (35.9 °C) 81 18 161/74 98 %      Temp src Heart Rate Source Patient Position BP Location FiO2 (%)   22 0542 22 0542 22 0604 22 0604 --   Tympanic Monitor Sitting Right arm          GENERAL: not distressed  HENT: atraumatic  EYES: no scleral icterus  CV: regular rhythm, regular rate  RESPIRATORY: normal effort.  Clear to auscultation bilaterally.  No wheezes, rales, or rhonchi  ABDOMEN: soft, left flank tenderness.  No abdominal distention or rigidity.  Normal bowel sounds.  MUSCULOSKELETAL: no deformity  NEURO: alert,  moves all extremities, follows commands  SKIN: warm, dry    Vital signs and nursing notes reviewed.          LAB RESULTS  Recent Results (from the past 24 hour(s))   Comprehensive Metabolic Panel    Collection Time: 08/22/22  7:02 AM    Specimen: Blood   Result Value Ref Range    Glucose 117 (H) 65 - 99 mg/dL    BUN 18 8 - 23 mg/dL    Creatinine 1.21 0.76 - 1.27 mg/dL    Sodium 140 136 - 145 mmol/L    Potassium 4.3 3.5 - 5.2 mmol/L    Chloride 104 98 - 107 mmol/L    CO2 27.0 22.0 - 29.0 mmol/L    Calcium 9.5 8.6 - 10.5 mg/dL    Total Protein 6.6 6.0 - 8.5 g/dL    Albumin 4.10 3.50 - 5.20 g/dL    ALT (SGPT) 11 1 - 41 U/L    AST (SGOT) 22 1 - 40 U/L    Alkaline Phosphatase 106 39 - 117 U/L    Total Bilirubin 0.8 0.0 - 1.2 mg/dL    Globulin 2.5 gm/dL    A/G Ratio 1.6 g/dL    BUN/Creatinine Ratio 14.9 7.0 - 25.0    Anion Gap 9.0 5.0 - 15.0 mmol/L    eGFR 61.7 >60.0 mL/min/1.73   Urinalysis With Microscopic If Indicated (No Culture) - Urine, Clean Catch    Collection Time: 08/22/22  7:02 AM    Specimen: Urine, Clean Catch   Result Value Ref Range    Color, UA Dark Yellow (A) Yellow, Straw    Appearance, UA Cloudy (A) Clear    pH, UA <=5.0 5.0 - 8.0    Specific Gravity, UA 1.025 1.005 - 1.030    Glucose, UA Negative Negative    Ketones, UA Trace (A) Negative    Bilirubin, UA Negative Negative    Blood, UA Large (3+) (A) Negative    Protein, UA 30 mg/dL (1+) (A) Negative    Leuk Esterase, UA Trace (A) Negative    Nitrite, UA Negative Negative    Urobilinogen, UA 1.0 E.U./dL 0.2 - 1.0 E.U./dL   CBC Auto Differential    Collection Time: 08/22/22  7:02 AM    Specimen: Blood   Result Value Ref Range    WBC 16.29 (H) 3.40 - 10.80 10*3/mm3    RBC 6.59 (H) 4.14 - 5.80 10*6/mm3    Hemoglobin 14.4 13.0 - 17.7 g/dL    Hematocrit 49.8 37.5 - 51.0 %    MCV 75.6 (L) 79.0 - 97.0 fL    MCH 21.9 (L) 26.6 - 33.0 pg    MCHC 28.9 (L) 31.5 - 35.7 g/dL    RDW 18.3 (H) 12.3 - 15.4 %    RDW-SD 46.1 37.0 - 54.0 fl    MPV 9.6 6.0 - 12.0 fL     Platelets 581 (H) 140 - 450 10*3/mm3    Neutrophil % 79.6 (H) 42.7 - 76.0 %    Lymphocyte % 6.1 (L) 19.6 - 45.3 %    Monocyte % 11.5 5.0 - 12.0 %    Eosinophil % 1.2 0.3 - 6.2 %    Basophil % 0.4 0.0 - 1.5 %    Immature Grans % 1.2 (H) 0.0 - 0.5 %    Neutrophils, Absolute 12.96 (H) 1.70 - 7.00 10*3/mm3    Lymphocytes, Absolute 0.99 0.70 - 3.10 10*3/mm3    Monocytes, Absolute 1.87 (H) 0.10 - 0.90 10*3/mm3    Eosinophils, Absolute 0.20 0.00 - 0.40 10*3/mm3    Basophils, Absolute 0.07 0.00 - 0.20 10*3/mm3    Immature Grans, Absolute 0.20 (H) 0.00 - 0.05 10*3/mm3    nRBC 0.0 0.0 - 0.2 /100 WBC   Urinalysis, Microscopic Only - Urine, Clean Catch    Collection Time: 08/22/22  7:02 AM    Specimen: Urine, Clean Catch   Result Value Ref Range    RBC, UA 13-20 (A) None Seen, 0-2 /HPF    WBC, UA 0-2 None Seen, 0-2 /HPF    Bacteria, UA None Seen None Seen /HPF    Squamous Epithelial Cells, UA 0-2 None Seen, 0-2 /HPF    Hyaline Casts, UA None Seen None Seen /LPF    Methodology Automated Microscopy        Ordered the above labs and independently reviewed the results.        RADIOLOGY  CT Abdomen Pelvis With Contrast    Result Date: 8/22/2022  Narrative: CT OF THE ABDOMEN AND PELVIS WITH CONTRAST 08/22/2022  HISTORY: Left flank pain.  Axial images were obtained from the lung bases to the symphysis pubis after intravenous contrast. No oral contrast was given.  There is mild left perinephric stranding. Small exophytic left renal lesion is seen, also seen on the previous study of 02/13/2019 and probably a slightly hyperdense cyst.  There is mild-to-moderate left hydronephrosis and hydroureter down to an approximately 6 mm stone in the distal left ureter just proximal to the left ureterovesical junction.  There is a punctate nonobstructing right renal stone. No right hydronephrosis is seen. Tiny right renal cyst is seen.  There is a small hiatal hernia.  Gallbladder has been removed. There is mild compensatory biliary dilatation in  the liver. The spleen appears unremarkable. There is an approximately 12 mm low-density lesion in the pancreatic head. There is some additional cystic change in the uncinate process. Please see additional dictation for the MRI of the abdomen from 08/09/2022.  There is colonic diverticulosis. Urinary bladder and prostate gland appear normal.  No bowel wall thickening or bowel dilatation is seen.      Impression: 1. Moderate obstructive uropathy on the left from an approximately 6 mm distal left ureteral stone. 2. Punctate nonobstructing right renal stone. 3. Status post cholecystectomy. 4. Small hiatal hernia. 5. Small cystic lesions in the pancreas. Please see additional dictation for the MRI of the abdomen from 08/09/2022. 6. Colonic diverticulosis. 7. Report was discussed with the ER practitioner.  Radiation dose reduction techniques were utilized, including automated exposure control and exposure modulation based on body size.       I ordered the above noted radiological studies. Reviewed by me and discussed with radiologist.  See dictation for official radiology interpretation.    MEDICATIONS GIVEN IN ER  Medications   sodium chloride 0.9 % flush 10 mL (has no administration in time range)   sodium chloride 0.9 % infusion (has no administration in time range)   HYDROmorphone (DILAUDID) PCA 0.2 mg/ml 50 mL syringe (has no administration in time range)   naloxone (NARCAN) injection 0.1 mg (has no administration in time range)   levoFLOXacin (LEVAQUIN) 500 mg/100 mL D5W (premix) (LEVAQUIN) 500 mg (has no administration in time range)   morphine injection 4 mg (4 mg Intravenous Given 8/22/22 0707)   ondansetron (ZOFRAN) injection 4 mg (4 mg Intravenous Given 8/22/22 0707)   iopamidol (ISOVUE-300) 61 % injection 100 mL (85 mL Intravenous Given by Other 8/22/22 0814)   HYDROmorphone (DILAUDID) injection 1 mg (1 mg Intravenous Given 8/22/22 0934)       MEDICAL RECORD REVIEW  I reviewed the patient's  records      PROGRESS, DATA ANALYSIS, CONSULTS, AND MEDICAL DECISION MAKING    All labs have been independently reviewed by me.  All radiology studies have been reviewed by me. Discussion below represents my analysis of pertinent findings related to patient's condition, differential diagnosis, treatment plan and final disposition.    DDX includes but not limited to urolithiasis, pyelonephritis, urinary retention, abdominal mass, urinary tract infection.      ED Course as of 08/22/22 1023   Mon Aug 22, 2022   0740 Patient does have large blood (3+) in his urine.  No bacteria seen.  Kidney function normal. [AH]   0846 Discussed the patient with radiologist who states he has a distal left ureteral stone at approximately 6 mm in size. [AH]   0850 Discussed lab and imaging findings with the patient and that we have placed a call consult to urology given the size of his stone and pain. [AH]   0926 Discussed the patient with Dr. Rodriguez who will come see the patient in the ER. [AH]   0945 Dr. Oliveira plans to take the patient to the OR tomorrow after holding Eliquis today.  He recommends ice chips only at this time.  Request admission to hospitalist given other medical problems. [AH]   0951 Discussed the patient with TONIE Nevarez with [facility] who agrees to admit the patient to Dr. Hines. []      ED Course User Index  [AH] Corazon Berger PA           Reviewed pt's history and workup with Dr. Zaragoza.  After a bedside evaluation; they agree with the plan of care      Patient's disposition is admission.     Patient was placed in face mask in first look. Patient was wearing facemask each time I entered the room and throughout our encounter. I wore full protective equipment throughout this patient encounter including a face mask, eye shield, gown and gloves. Hand hygiene was performed before donning protective equipment and after removal when leaving the room.        DIAGNOSIS  Final diagnoses:   Left ureteral stone   Left flank pain            Latest Documented Vital Signs:  As of 10:23 EDT  BP- 157/74 HR- 64 Temp- 96.6 °F (35.9 °C) (Tympanic) O2 sat- 95%         Corazon Berger PA  08/22/22 1023

## 2022-08-22 NOTE — H&P
Patient Name:  Madhu Santoro  YOB: 1945  MRN:  [id number]  Admit Date:  8/22/2022  Patient Care Team:  Damian Sanchez MD as PCP - General (Family Medicine)  Jr Temo Cortez MD as Surgeon (Cardiothoracic Surgery)  Netta Mayorga Jr., MD as Consulting Physician (Vascular Surgery)  Damian Sanchez MD as Referring Physician (Family Medicine)  Aquiles Lopez MD as Consulting Physician (Hematology and Oncology)      Subjective   History Present Illness     Chief Complaint   Patient presents with   • Flank Pain       Mr. Santoro is a 76 y.o. former smoker with a history of atrial flutter on chronic anticoagulation, hypertension, hyperlipidemia, CVA, CAD status post CABG 2017 who presents to [facility] [place] ER with chief complaint of flank pain admitted for obstructive uropathy due to left ureteral stone.    Skin tag on right eye started on about 4-5 months ago & Dr. 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    Patient reports sudden onset of left flank pain approximately 1730 on 8/21/2022 without associated fever or chills.  Unrelenting pain with complaints of burning urination and difficulty urinating; therefore, went to [facility] [place] ER for further evaluation.    CT confirms left flank pain 6 mm stone and urology evaluated patient in ER with plans for hold apixaban & OR tomorrow on 8/23/2022.  Empiric IV Levaquin initiated in ER.    Recommendation pending hospital course.  Details below.    History of Present Illness  Review of Systems   Constitutional: Negative for chills and fever.   HENT: Negative for congestion and rhinorrhea.    Respiratory: Negative for cough and shortness of breath.    Cardiovascular: Negative for chest pain and leg swelling.   Gastrointestinal: Negative for abdominal pain, constipation, diarrhea, nausea and vomiting.   Endocrine: Negative for polydipsia, polyphagia and polyuria.   Genitourinary: Positive for difficulty  urinating, dysuria and flank pain (left).   Musculoskeletal: Positive for gait problem (due to generalized weakness). Negative for myalgias.   Skin: Negative for rash and wound.   Neurological: Negative for dizziness and light-headedness.   Psychiatric/Behavioral: Negative for confusion and hallucinations.        Personal History     Past Medical History:   Diagnosis Date   • Anxiety    • Arthritis    • Atrial flutter (HCC)     Status post cavotricuspid isthmus ablation by Dr. Gustafson on 4/18/17   • Mandel esophagus    • Benign essential hypertension    • CAD (coronary artery disease)     3 vessel CABG 4/11/17 by Dr. Cortez: ROSARIO-prox LAD, SVG-OM1, SVG-OM3   • Carotid artery disease (HCC)     Status post carotid endarterectomy - USG 4/10/17: 50-59% NICHELLE, 1-15% LICA.    • Colonic polyp    • Cyst of pancreas    • DDD (degenerative disc disease), lumbosacral    • GERD (gastroesophageal reflux disease)    • H/O bone density study 2013   • H/O complete eye exam 2014   • History of kidney stone    • HLD (hyperlipidemia)    • Hypertension    • Lipid screening 05/31/2013   • Low back pain     [facility]   • Screening for prostate cancer 07/07/2015   • Stroke (HCC)      Past Surgical History:   Procedure Laterality Date   • CARDIAC CATHETERIZATION N/A 04/10/2017    Procedure: Left Heart Cath;  Surgeon: Marjorie Healy MD;  Location: [facility] INVASIVE LOCATION;  Service:    • CARDIAC CATHETERIZATION N/A 04/10/2017    Procedure: Coronary angiography;  Surgeon: Marjorie Healy MD;  Location: [facility] INVASIVE LOCATION;  Service:    • CARDIAC CATHETERIZATION N/A 04/10/2017    Procedure: Left ventriculography;  Surgeon: Marjorie Healy MD;  Location: [facility] CATH INVASIVE LOCATION;  Service:    • CARDIAC CATHETERIZATION  2011   • CARDIAC ELECTROPHYSIOLOGY PROCEDURE N/A 04/18/2017    Procedure: Ablation atrial flutter;  Surgeon: Jose Antonio Gustafson MD;  Location: [facility] INVASIVE LOCATION;   Service:    • CAROTID ENDARTERECTOMY     • CHOLECYSTECTOMY     • CHOLECYSTECTOMY WITH INTRAOPERATIVE CHOLANGIOGRAM N/A 09/07/2019    Procedure: Laparoscopic cholecystectomy with intraoperative cholangiogram;  Surgeon: Gauri Travis MD;  Location: [facility] MAIN OR;  Service: General   • COLONOSCOPY  01/06/2015    Diverticulosis, one TA   • COLONOSCOPY N/A 02/14/2019    tics, NBIH, adenomatous polyp x 2   • COLONOSCOPY N/A 11/05/2021    Procedure: COLONOSCOPY TO CECUM AND TERM. ILEUM WITH COLD POLYPECTOMIES;  Surgeon: Everton Abel MD;  Location: [facility] ENDOSCOPY;  Service: Gastroenterology;  Laterality: N/A;  PRE OP - PERS H/O POLYPS  POST OP - COLON POLYPS,, DIVERTICULOSIS, HEMORRHOIDS   • CORONARY ARTERY BYPASS GRAFT N/A 04/11/2017    Procedure: AR STERNOTOMY CORONARY ARTERY BYPASS GRAFT TIMES 3 USING LEFT INTERNAL MAMMARY ARTERY AND LEFT GREATER SAPHENOUS VEIN GRAFT PER ENDOSCOPIC VEIN HARVESTING AND PRP ;  Surgeon: Temo Cortez MD;  Location: [facility] MAIN OR;  Service:    • ENDOSCOPY  01/06/2015    HH, Ervin's esophagus   • ENDOSCOPY N/A 02/14/2019    Z line irregular, HH, Ervin's esophagus   • ENDOSCOPY N/A 11/05/2021    Procedure: ESOPHAGOGASTRODUODENOSCOPY WITH BIOPSIES;  Surgeon: Everton Abel MD;  Location: [facility] ENDOSCOPY;  Service: Gastroenterology;  Laterality: N/A;  PRE OP - PERS H/O ERVIN'S  POST OP - IRREG Z LINE   • KNEE SURGERY Left    • VASECTOMY       Family History   Problem Relation Age of Onset   • Hypertension Mother    • Heart disease Mother    • Heart attack Mother    • Stroke Mother    • Heart disease Father    • Heart attack Father    • Stroke Father    • Hypertension Sister    • Heart attack Brother    • Heart disease Brother    • No Known Problems Brother    • Heart disease Brother    • Heart attack Brother    • Diabetes Brother    • No Known Problems Maternal Grandmother    • No Known Problems Maternal Grandfather    • No Known Problems Paternal Grandmother    • No  Known Problems Paternal Grandfather    • Pancreatic cancer Paternal Cousin    • Arthritis Other    • Diabetes Other    • Hypertension Other    • Kidney disease Other         stones     Social History     Tobacco Use   • Smoking status: Former Smoker     Packs/day: 1.00     Types: Cigarettes     Start date: 1959     Quit date: 2009     Years since quittin.6   • Smokeless tobacco: Never Used   • Tobacco comment: CAFFEINE USE   Vaping Use   • Vaping Use: Never used   Substance Use Topics   • Alcohol use: Yes     Comment: rarely   • Drug use: No     No current facility-administered medications on file prior to encounter.     Current Outpatient Medications on File Prior to Encounter   Medication Sig Dispense Refill   • aluminum-magnesium hydroxide-simethicone (MAALOX MAX) 400-400-40 MG/5ML suspension Take 20 mL by mouth Every 6 (Six) Hours As Needed for Indigestion or Heartburn.     • amLODIPine (NORVASC) 2.5 MG tablet TAKE ONE TABLET BY MOUTH DAILY 30 tablet 11   • apixaban (ELIQUIS) 5 MG tablet tablet Take 1 tablet by mouth 2 (Two) Times a Day. Lot # [id number]  Exp 10/23 28 tablet 0   • aspirin (aspirin) 81 MG EC tablet Take 1 tablet by mouth Daily. 30 tablet 0   • ferrous gluconate (FERGON) 324 MG tablet Take 1 tablet by mouth Daily With Breakfast. 30 tablet 2   • HYDROcodone-acetaminophen (NORCO)  MG per tablet Take 1 tablet by mouth Every 6 (Six) Hours As Needed for Moderate Pain . Take at least 6 hours apart. Max 2/day. 30 day supply 60 tablet 0   • mupirocin (BACTROBAN) 2 % cream Apply 1 application topically to the appropriate area as directed 2 (Two) Times a Day. 15 g 0   • Myrbetriq 25 MG tablet sustained-release 24 hour 24 hr tablet Take 25 mg by mouth Daily.     • nitroglycerin (NITROSTAT) 0.4 MG SL tablet Place 1 tablet under the tongue Every 5 (Five) Minutes As Needed for Chest Pain. Take no more than 3 doses in 15 minutes. 20 tablet 2   • pantoprazole (PROTONIX) 40 MG EC tablet Take 1  tablet by mouth Daily. 90 tablet 1   • ramipril (ALTACE) 5 MG capsule Take 1 capsule by mouth Daily. 90 capsule 1   • rosuvastatin (Crestor) 20 MG tablet Take 1 tablet by mouth Daily. 90 tablet 1   • Naloxegol Oxalate (Movantik) 25 MG tablet Take 1 tablet by mouth Every Morning. 30 tablet 0   • sucralfate (Carafate) 1 GM/10ML suspension Take 10 mL by mouth 2 (Two) Times a Day. (Patient not taking: Reported on 8/22/2022) 420 mL 1   • Sulfamethoxazole-Trimethoprim (BACTRIM DS PO) Take  by mouth 2 (Two) Times a Day.       Allergies   Allergen Reactions   • Atorvastatin Myalgia     Myalgia   • Penicillins Hives, Swelling and Rash       Objective    Objective     Vital Signs  Temp:  [96.6 °F (35.9 °C)-97.9 °F (36.6 °C)] 97.9 °F (36.6 °C)  Heart Rate:  [60-91] 91  Resp:  [16-18] 16  BP: (134-174)/(69-86) 134/86  SpO2:  [90 %-98 %] 93 %  on   ;   Device (Oxygen Therapy): room air  Body mass index is 26.61 kg/m².    Physical Exam  Constitutional:       General: He is not in acute distress.     Appearance: He is not toxic-appearing.      Comments: uncomfortable   HENT:      Head: Normocephalic and atraumatic.   Eyes:      Extraocular Movements: Extraocular movements intact.      Comments: RIGHT EYE UPPER LID LESION   Cardiovascular:      Rate and Rhythm: Normal rate. Rhythm irregular.   Pulmonary:      Effort: Pulmonary effort is normal.      Breath sounds: Normal breath sounds.   Abdominal:      General: Bowel sounds are normal.      Palpations: Abdomen is soft.      Tenderness: There is left CVA tenderness.   Musculoskeletal:         General: No tenderness.      Cervical back: Normal range of motion and neck supple.      Right lower leg: No edema.      Left lower leg: No edema.   Skin:     General: Skin is warm and dry.   Neurological:      Mental Status: He is alert and oriented to person, place, and time.      Cranial Nerves: No cranial nerve deficit.   Psychiatric:         Behavior: Behavior normal.         Thought  Content: Thought content normal.         Results Review:  I reviewed the patient's new clinical results.  I reviewed the patient's new imaging results and agree with the interpretation.  I reviewed the patient's other test results and agree with the interpretation  I personally viewed and interpreted the patient's EKG/Telemetry data  Discussed with ED provider.    Lab Results (last 24 hours)     Procedure Component Value Units Date/Time    CBC & Differential [[id number]]  (Abnormal) Collected: 08/22/22 0702    Specimen: Blood Updated: 08/22/22 0830    Narrative:      The following orders were created for panel order CBC & Differential.  Procedure                               Abnormality         Status                     ---------                               -----------         ------                     CBC Auto Differential[[id number]]        Abnormal            Final result                 Please view results for these tests on the individual orders.    Comprehensive Metabolic Panel [[id number]]  (Abnormal) Collected: 08/22/22 0702    Specimen: Blood Updated: 08/22/22 0733     Glucose 117 mg/dL      BUN 18 mg/dL      Creatinine 1.21 mg/dL      Sodium 140 mmol/L      Potassium 4.3 mmol/L      Chloride 104 mmol/L      CO2 27.0 mmol/L      Calcium 9.5 mg/dL      Total Protein 6.6 g/dL      Albumin 4.10 g/dL      ALT (SGPT) 11 U/L      AST (SGOT) 22 U/L      Alkaline Phosphatase 106 U/L      Total Bilirubin 0.8 mg/dL      Globulin 2.5 gm/dL      A/G Ratio 1.6 g/dL      BUN/Creatinine Ratio 14.9     Anion Gap 9.0 mmol/L      eGFR 61.7 mL/min/1.73      Comment: National Kidney Foundation and American Society of Nephrology (ASN) Task Force recommended calculation based on the Chronic Kidney Disease Epidemiology Collaboration (CKD-EPI) equation refit without adjustment for race.       Narrative:      GFR Normal >60  Chronic Kidney Disease <60  Kidney Failure <15      Urinalysis With Microscopic If Indicated (No Culture)  - Urine, Clean Catch [[id number]]  (Abnormal) Collected: 08/22/22 0702    Specimen: Urine, Clean Catch Updated: 08/22/22 0725     Color, UA Dark Yellow     Appearance, UA Cloudy     pH, UA <=5.0     Specific Gravity, UA 1.025     Glucose, UA Negative     Ketones, UA Trace     Bilirubin, UA Negative     Blood, UA Large (3+)     Protein, UA 30 mg/dL (1+)     Leuk Esterase, UA Trace     Nitrite, UA Negative     Urobilinogen, UA 1.0 E.U./dL    CBC Auto Differential [[id number]]  (Abnormal) Collected: 08/22/22 0702    Specimen: Blood Updated: 08/22/22 0830     WBC 16.29 10*3/mm3      RBC 6.59 10*6/mm3      Hemoglobin 14.4 g/dL      Hematocrit 49.8 %      MCV 75.6 fL      MCH 21.9 pg      MCHC 28.9 g/dL      RDW 18.3 %      RDW-SD 46.1 fl      MPV 9.6 fL      Platelets 581 10*3/mm3      Neutrophil % 79.6 %      Lymphocyte % 6.1 %      Monocyte % 11.5 %      Eosinophil % 1.2 %      Basophil % 0.4 %      Immature Grans % 1.2 %      Neutrophils, Absolute 12.96 10*3/mm3      Lymphocytes, Absolute 0.99 10*3/mm3      Monocytes, Absolute 1.87 10*3/mm3      Eosinophils, Absolute 0.20 10*3/mm3      Basophils, Absolute 0.07 10*3/mm3      Immature Grans, Absolute 0.20 10*3/mm3      nRBC 0.0 /100 WBC     Urinalysis, Microscopic Only - Urine, Clean Catch [[id number]]  (Abnormal) Collected: 08/22/22 0702    Specimen: Urine, Clean Catch Updated: 08/22/22 0740     RBC, UA 13-20 /HPF      WBC, UA 0-2 /HPF      Bacteria, UA None Seen /HPF      Squamous Epithelial Cells, UA 0-2 /HPF      Hyaline Casts, UA None Seen /LPF      Methodology Automated Microscopy    COVID PRE-OP / PRE-PROCEDURE SCREENING ORDER (NO ISOLATION) - Swab, Nasopharynx [[id number]]  (Normal) Collected: 08/22/22 1033    Specimen: Swab from Nasopharynx Updated: 08/22/22 1122    Narrative:      The following orders were created for panel order COVID PRE-OP / PRE-PROCEDURE SCREENING ORDER (NO ISOLATION) - Swab, Nasopharynx.  Procedure                               Abnormality          Status                     ---------                               -----------         ------                     COVID-19,[facility]HOUSE...[[id number]]  Normal              Final result                 Please view results for these tests on the individual orders.    COVID-19,[facility]HOU[facility] CEPHEID/HILARIA NP SWAB IN TRANSPORT MEDIA 8-12 HR TAT - Swab, Nasopharynx [[id number]]  (Normal) Collected: 08/22/22 1033    Specimen: Swab from Nasopharynx Updated: 08/22/22 1122     COVID19 Not Detected    Narrative:      Fact sheet for providers: https://www.fda.gov/media/852996/download    Fact sheet for patients: https://www.fda.gov/media/015765/download    Test performed by PCR.          Imaging Results (Last 24 Hours)     Procedure Component Value Units Date/Time    CT Abdomen Pelvis With Contrast [[id number]] Collected: 08/22/22 0852     Updated: 08/22/22 0852    Narrative:      CT OF THE ABDOMEN AND PELVIS WITH CONTRAST 08/22/2022     HISTORY: Left flank pain.     Axial images were obtained from the lung bases to the symphysis pubis  after intravenous contrast. No oral contrast was given.     There is mild left perinephric stranding. Small exophytic left renal  lesion is seen, also seen on the previous study of 02/13/2019 and  probably a slightly hyperdense cyst.     There is mild-to-moderate left hydronephrosis and hydroureter down to an  approximately 6 mm stone in the distal left ureter just proximal to the  left ureterovesical junction.     There is a punctate nonobstructing right renal stone. No right  hydronephrosis is seen. Tiny right renal cyst is seen.     There is a small hiatal hernia.     Gallbladder has been removed. There is mild compensatory biliary  dilatation in the liver. The spleen appears unremarkable. There is an  approximately 12 mm low-density lesion in the pancreatic head. There is  some additional cystic change in the uncinate process. Please see  additional dictation for the MRI of the abdomen  from 08/09/2022.     There is colonic diverticulosis. Urinary bladder and prostate gland  appear normal.     No bowel wall thickening or bowel dilatation is seen.       Impression:      1. Moderate obstructive uropathy on the left from an approximately 6 mm  distal left ureteral stone.  2. Punctate nonobstructing right renal stone.  3. Status post cholecystectomy.  4. Small hiatal hernia.  5. Small cystic lesions in the pancreas. Please see additional dictation  for the MRI of the abdomen from 08/09/2022.  6. Colonic diverticulosis.  7. Report was discussed with the ER practitioner.     Radiation dose reduction techniques were utilized, including automated  exposure control and exposure modulation based on body size.                Results for orders placed during the hospital encounter of 04/10/21    Adult Transthoracic Echo Complete w/ Color, Spectral and Contrast if Necessary Per Protocol    Interpretation Summary  · Estimated left ventricular EF = 57% Left ventricular systolic function is normal.  · Left ventricular diastolic function was normal.  · The right ventricular cavity is mildly dilated.  · The right atrial cavity is mildly dilated.  · Mild mitral annular calcification is present. There is mild, bileaflet mitral valve thickening present. Trace mitral valve regurgitation is present. No significant mitral valve stenosis is present.  · No aortic valve regurgitation or stenosis is present. There is mild thickening of the aortic valve. The aortic valve appears trileaflet.      No orders to display        Assessment/Plan     Active Hospital Problems    Diagnosis  POA   • **Left ureteral stone [N20.1]  Yes   • Obstructive uropathy [N13.9]  Yes   • Chronic anticoagulation [Z79.01]  Not Applicable   • Facial skin lesion [L98.9]  Yes   • S/P CABG (coronary artery bypass graft) [Z95.1]  Not Applicable   • Benign essential hypertension [I10]  Yes      Resolved Hospital Problems   No resolved problems to display.        Mr. Santoro is a 76 y.o. former smoker with a history of atrial flutter on chronic anticoagulation, hypertension, hyperlipidemia, CVA, CAD status post CABG 2017 who presents to [facility] ER with chief complaint of flank pain admitted for obstructive uropathy due to left ureteral stone.      Left ureteral stone /   Obstructive uropathy: Confirmed on CT.  Urology plan procedure on 8/23/2022.  Urology managing Dilaudid PCA for symptom management.  Monitor for sedation.  Empiric IV Levaquin initiated.      Benign essential hypertension: BP greater than optimal.  Most likely due to above.  Plan to resume amlodipine 2.5 mg p.o. daily and ACE inhibitor for now given unremarkable renal function.  Monitor blood pressure setting of IV Dilaudid PCA.      S/P CABG (coronary artery bypass graft): Denies chest pain at present.  ASA held for now due to above.  Statin continued.      Chronic anticoagulation: Apixaban 5 mg p.o. twice daily held for now.      Facial skin lesion: History of skin cancer.  Follows with Dr. Charles Villar with reported plans for outpatient follow-up.       · I discussed the patient's findings and my recommendations with patient, RN, & Dr. Hines.    VTE Prophylaxis - SCD  Code Status - CPR       MY Ruiz  [facility]  08/22/22  13:01 EDT

## 2022-08-23 ENCOUNTER — ANESTHESIA (OUTPATIENT)
Dept: PERIOP | Facility: HOSPITAL | Age: 77
End: 2022-08-23

## 2022-08-23 ENCOUNTER — ANESTHESIA EVENT (OUTPATIENT)
Dept: PERIOP | Facility: HOSPITAL | Age: 77
End: 2022-08-23

## 2022-08-23 ENCOUNTER — APPOINTMENT (OUTPATIENT)
Dept: GENERAL RADIOLOGY | Facility: HOSPITAL | Age: 77
End: 2022-08-23

## 2022-08-23 LAB
ANION GAP SERPL CALCULATED.3IONS-SCNC: 10 MMOL/L (ref 5–15)
ANION GAP SERPL CALCULATED.3IONS-SCNC: 12.6 MMOL/L (ref 5–15)
BASOPHILS # BLD AUTO: 0.03 10*3/MM3 (ref 0–0.2)
BASOPHILS NFR BLD AUTO: 0.2 % (ref 0–1.5)
BUN SERPL-MCNC: 11 MG/DL (ref 8–23)
BUN SERPL-MCNC: 12 MG/DL (ref 8–23)
BUN/CREAT SERPL: 11.1 (ref 7–25)
BUN/CREAT SERPL: 12.9 (ref 7–25)
CALCIUM SPEC-SCNC: 8.3 MG/DL (ref 8.6–10.5)
CALCIUM SPEC-SCNC: 8.5 MG/DL (ref 8.6–10.5)
CHLORIDE SERPL-SCNC: 106 MMOL/L (ref 98–107)
CHLORIDE SERPL-SCNC: 106 MMOL/L (ref 98–107)
CO2 SERPL-SCNC: 21.4 MMOL/L (ref 22–29)
CO2 SERPL-SCNC: 22 MMOL/L (ref 22–29)
CREAT SERPL-MCNC: 0.93 MG/DL (ref 0.76–1.27)
CREAT SERPL-MCNC: 0.99 MG/DL (ref 0.76–1.27)
DEPRECATED RDW RBC AUTO: 42.6 FL (ref 37–54)
DEPRECATED RDW RBC AUTO: 46.4 FL (ref 37–54)
EGFRCR SERPLBLD CKD-EPI 2021: 78.5 ML/MIN/1.73
EGFRCR SERPLBLD CKD-EPI 2021: 84.6 ML/MIN/1.73
EOSINOPHIL # BLD AUTO: 0.04 10*3/MM3 (ref 0–0.4)
EOSINOPHIL NFR BLD AUTO: 0.3 % (ref 0.3–6.2)
ERYTHROCYTE [DISTWIDTH] IN BLOOD BY AUTOMATED COUNT: 18.7 % (ref 12.3–15.4)
ERYTHROCYTE [DISTWIDTH] IN BLOOD BY AUTOMATED COUNT: 19.6 % (ref 12.3–15.4)
GLUCOSE SERPL-MCNC: 120 MG/DL (ref 65–99)
GLUCOSE SERPL-MCNC: 180 MG/DL (ref 65–99)
HCT VFR BLD AUTO: 43.3 % (ref 37.5–51)
HCT VFR BLD AUTO: 46.5 % (ref 37.5–51)
HGB BLD-MCNC: 13.1 G/DL (ref 13–17.7)
HGB BLD-MCNC: 13.5 G/DL (ref 13–17.7)
LYMPHOCYTES # BLD AUTO: 0.5 10*3/MM3 (ref 0.7–3.1)
LYMPHOCYTES # BLD MANUAL: 0.13 10*3/MM3 (ref 0.7–3.1)
LYMPHOCYTES NFR BLD AUTO: 3.9 % (ref 19.6–45.3)
LYMPHOCYTES NFR BLD MANUAL: 5.2 % (ref 5–12)
MCH RBC QN AUTO: 21.6 PG (ref 26.6–33)
MCH RBC QN AUTO: 21.7 PG (ref 26.6–33)
MCHC RBC AUTO-ENTMCNC: 29 G/DL (ref 31.5–35.7)
MCHC RBC AUTO-ENTMCNC: 30.3 G/DL (ref 31.5–35.7)
MCV RBC AUTO: 71.7 FL (ref 79–97)
MCV RBC AUTO: 74.4 FL (ref 79–97)
MONOCYTES # BLD AUTO: 0.49 10*3/MM3 (ref 0.1–0.9)
MONOCYTES # BLD: 0.67 10*3/MM3 (ref 0.1–0.9)
MONOCYTES NFR BLD AUTO: 3.8 % (ref 5–12)
NEUTROPHILS # BLD AUTO: 12.18 10*3/MM3 (ref 1.7–7)
NEUTROPHILS NFR BLD AUTO: 11.72 10*3/MM3 (ref 1.7–7)
NEUTROPHILS NFR BLD AUTO: 90.4 % (ref 42.7–76)
NEUTROPHILS NFR BLD MANUAL: 93.8 % (ref 42.7–76)
PLAT MORPH BLD: NORMAL
PLATELET # BLD AUTO: 365 10*3/MM3 (ref 140–450)
PLATELET # BLD AUTO: 508 10*3/MM3 (ref 140–450)
PMV BLD AUTO: 10.6 FL (ref 6–12)
PMV BLD AUTO: 10.9 FL (ref 6–12)
POTASSIUM SERPL-SCNC: 4.2 MMOL/L (ref 3.5–5.2)
POTASSIUM SERPL-SCNC: 4.3 MMOL/L (ref 3.5–5.2)
QT INTERVAL: 405 MS
RBC # BLD AUTO: 6.04 10*6/MM3 (ref 4.14–5.8)
RBC # BLD AUTO: 6.25 10*6/MM3 (ref 4.14–5.8)
RBC MORPH BLD: NORMAL
SODIUM SERPL-SCNC: 138 MMOL/L (ref 136–145)
SODIUM SERPL-SCNC: 140 MMOL/L (ref 136–145)
VARIANT LYMPHS NFR BLD MANUAL: 1 % (ref 19.6–45.3)
WBC MORPH BLD: NORMAL
WBC NRBC COR # BLD: 12.96 10*3/MM3 (ref 3.4–10.8)
WBC NRBC COR # BLD: 12.98 10*3/MM3 (ref 3.4–10.8)

## 2022-08-23 PROCEDURE — 93005 ELECTROCARDIOGRAM TRACING: CPT | Performed by: UROLOGY

## 2022-08-23 PROCEDURE — 74420 UROGRAPHY RTRGR +-KUB: CPT

## 2022-08-23 PROCEDURE — 25010000002 DEXAMETHASONE PER 1 MG: Performed by: NURSE ANESTHETIST, CERTIFIED REGISTERED

## 2022-08-23 PROCEDURE — 85025 COMPLETE CBC W/AUTO DIFF WBC: CPT | Performed by: UROLOGY

## 2022-08-23 PROCEDURE — 25010000002 LEVOFLOXACIN PER 250 MG: Performed by: UROLOGY

## 2022-08-23 PROCEDURE — 93010 ELECTROCARDIOGRAM REPORT: CPT | Performed by: INTERNAL MEDICINE

## 2022-08-23 PROCEDURE — 0 IOTHALAMATE 60 % SOLUTION: Performed by: UROLOGY

## 2022-08-23 PROCEDURE — C1758 CATHETER, URETERAL: HCPCS | Performed by: UROLOGY

## 2022-08-23 PROCEDURE — 25010000002 PROPOFOL 10 MG/ML EMULSION: Performed by: NURSE ANESTHETIST, CERTIFIED REGISTERED

## 2022-08-23 PROCEDURE — 80048 BASIC METABOLIC PNL TOTAL CA: CPT | Performed by: UROLOGY

## 2022-08-23 PROCEDURE — C1769 GUIDE WIRE: HCPCS | Performed by: UROLOGY

## 2022-08-23 PROCEDURE — 36415 COLL VENOUS BLD VENIPUNCTURE: CPT | Performed by: NURSE PRACTITIONER

## 2022-08-23 PROCEDURE — 25010000002 ONDANSETRON PER 1 MG: Performed by: NURSE PRACTITIONER

## 2022-08-23 PROCEDURE — 0T778DZ DILATION OF LEFT URETER WITH INTRALUMINAL DEVICE, VIA NATURAL OR ARTIFICIAL OPENING ENDOSCOPIC: ICD-10-PCS | Performed by: UROLOGY

## 2022-08-23 PROCEDURE — BT1F1ZZ FLUOROSCOPY OF LEFT KIDNEY, URETER AND BLADDER USING LOW OSMOLAR CONTRAST: ICD-10-PCS | Performed by: UROLOGY

## 2022-08-23 PROCEDURE — 85007 BL SMEAR W/DIFF WBC COUNT: CPT | Performed by: UROLOGY

## 2022-08-23 PROCEDURE — C2617 STENT, NON-COR, TEM W/O DEL: HCPCS | Performed by: UROLOGY

## 2022-08-23 DEVICE — URETERAL STENT
Type: IMPLANTABLE DEVICE | Site: URETER | Status: FUNCTIONAL
Brand: PERCUFLEX™ PLUS

## 2022-08-23 RX ORDER — HYDRALAZINE HYDROCHLORIDE 20 MG/ML
5 INJECTION INTRAMUSCULAR; INTRAVENOUS
Status: DISCONTINUED | OUTPATIENT
Start: 2022-08-23 | End: 2022-08-23 | Stop reason: HOSPADM

## 2022-08-23 RX ORDER — HYDROMORPHONE HYDROCHLORIDE 1 MG/ML
0.5 INJECTION, SOLUTION INTRAMUSCULAR; INTRAVENOUS; SUBCUTANEOUS
Status: DISCONTINUED | OUTPATIENT
Start: 2022-08-23 | End: 2022-08-23 | Stop reason: HOSPADM

## 2022-08-23 RX ORDER — SODIUM CHLORIDE 9 MG/ML
100 INJECTION, SOLUTION INTRAVENOUS CONTINUOUS
Status: DISCONTINUED | OUTPATIENT
Start: 2022-08-23 | End: 2022-08-24 | Stop reason: HOSPADM

## 2022-08-23 RX ORDER — DEXAMETHASONE SODIUM PHOSPHATE 10 MG/ML
INJECTION INTRAMUSCULAR; INTRAVENOUS AS NEEDED
Status: DISCONTINUED | OUTPATIENT
Start: 2022-08-23 | End: 2022-08-23 | Stop reason: SURG

## 2022-08-23 RX ORDER — SODIUM CHLORIDE, SODIUM LACTATE, POTASSIUM CHLORIDE, CALCIUM CHLORIDE 600; 310; 30; 20 MG/100ML; MG/100ML; MG/100ML; MG/100ML
9 INJECTION, SOLUTION INTRAVENOUS CONTINUOUS PRN
Status: DISCONTINUED | OUTPATIENT
Start: 2022-08-23 | End: 2022-08-24 | Stop reason: HOSPADM

## 2022-08-23 RX ORDER — ONDANSETRON 2 MG/ML
4 INJECTION INTRAMUSCULAR; INTRAVENOUS ONCE AS NEEDED
Status: DISCONTINUED | OUTPATIENT
Start: 2022-08-23 | End: 2022-08-23 | Stop reason: HOSPADM

## 2022-08-23 RX ORDER — OXYCODONE AND ACETAMINOPHEN 7.5; 325 MG/1; MG/1
1 TABLET ORAL EVERY 4 HOURS PRN
Status: DISCONTINUED | OUTPATIENT
Start: 2022-08-23 | End: 2022-08-23 | Stop reason: HOSPADM

## 2022-08-23 RX ORDER — LABETALOL HYDROCHLORIDE 5 MG/ML
5 INJECTION, SOLUTION INTRAVENOUS
Status: DISCONTINUED | OUTPATIENT
Start: 2022-08-23 | End: 2022-08-23 | Stop reason: HOSPADM

## 2022-08-23 RX ORDER — MAGNESIUM HYDROXIDE 1200 MG/15ML
LIQUID ORAL AS NEEDED
Status: DISCONTINUED | OUTPATIENT
Start: 2022-08-23 | End: 2022-08-23 | Stop reason: HOSPADM

## 2022-08-23 RX ORDER — FAMOTIDINE 10 MG/ML
20 INJECTION, SOLUTION INTRAVENOUS
Status: COMPLETED | OUTPATIENT
Start: 2022-08-23 | End: 2022-08-23

## 2022-08-23 RX ORDER — FENTANYL CITRATE 50 UG/ML
50 INJECTION, SOLUTION INTRAMUSCULAR; INTRAVENOUS
Status: DISCONTINUED | OUTPATIENT
Start: 2022-08-23 | End: 2022-08-23 | Stop reason: HOSPADM

## 2022-08-23 RX ORDER — PROMETHAZINE HYDROCHLORIDE 25 MG/1
25 TABLET ORAL ONCE AS NEEDED
Status: DISCONTINUED | OUTPATIENT
Start: 2022-08-23 | End: 2022-08-23 | Stop reason: HOSPADM

## 2022-08-23 RX ORDER — FLUMAZENIL 0.1 MG/ML
0.2 INJECTION INTRAVENOUS AS NEEDED
Status: DISCONTINUED | OUTPATIENT
Start: 2022-08-23 | End: 2022-08-23 | Stop reason: HOSPADM

## 2022-08-23 RX ORDER — PROMETHAZINE HYDROCHLORIDE 25 MG/1
25 SUPPOSITORY RECTAL ONCE AS NEEDED
Status: DISCONTINUED | OUTPATIENT
Start: 2022-08-23 | End: 2022-08-23 | Stop reason: HOSPADM

## 2022-08-23 RX ORDER — EPHEDRINE SULFATE 50 MG/ML
5 INJECTION, SOLUTION INTRAVENOUS ONCE AS NEEDED
Status: DISCONTINUED | OUTPATIENT
Start: 2022-08-23 | End: 2022-08-23 | Stop reason: HOSPADM

## 2022-08-23 RX ORDER — OXYCODONE HYDROCHLORIDE AND ACETAMINOPHEN 5; 325 MG/1; MG/1
1 TABLET ORAL EVERY 4 HOURS PRN
Status: DISCONTINUED | OUTPATIENT
Start: 2022-08-23 | End: 2022-08-24 | Stop reason: HOSPADM

## 2022-08-23 RX ORDER — DIPHENHYDRAMINE HCL 25 MG
25 CAPSULE ORAL
Status: DISCONTINUED | OUTPATIENT
Start: 2022-08-23 | End: 2022-08-23 | Stop reason: HOSPADM

## 2022-08-23 RX ORDER — IBUPROFEN 600 MG/1
600 TABLET ORAL ONCE AS NEEDED
Status: DISCONTINUED | OUTPATIENT
Start: 2022-08-23 | End: 2022-08-23 | Stop reason: HOSPADM

## 2022-08-23 RX ORDER — DIPHENHYDRAMINE HYDROCHLORIDE 50 MG/ML
12.5 INJECTION INTRAMUSCULAR; INTRAVENOUS
Status: DISCONTINUED | OUTPATIENT
Start: 2022-08-23 | End: 2022-08-23 | Stop reason: HOSPADM

## 2022-08-23 RX ORDER — MIDAZOLAM HYDROCHLORIDE 1 MG/ML
1 INJECTION INTRAMUSCULAR; INTRAVENOUS
Status: DISCONTINUED | OUTPATIENT
Start: 2022-08-23 | End: 2022-08-23 | Stop reason: HOSPADM

## 2022-08-23 RX ORDER — NALOXONE HCL 0.4 MG/ML
0.2 VIAL (ML) INJECTION AS NEEDED
Status: DISCONTINUED | OUTPATIENT
Start: 2022-08-23 | End: 2022-08-23 | Stop reason: HOSPADM

## 2022-08-23 RX ORDER — SODIUM CHLORIDE 0.9 % (FLUSH) 0.9 %
10 SYRINGE (ML) INJECTION AS NEEDED
Status: DISCONTINUED | OUTPATIENT
Start: 2022-08-23 | End: 2022-08-23 | Stop reason: HOSPADM

## 2022-08-23 RX ORDER — SODIUM CHLORIDE 0.9 % (FLUSH) 0.9 %
10 SYRINGE (ML) INJECTION EVERY 12 HOURS SCHEDULED
Status: DISCONTINUED | OUTPATIENT
Start: 2022-08-23 | End: 2022-08-23 | Stop reason: HOSPADM

## 2022-08-23 RX ORDER — HYDROCODONE BITARTRATE AND ACETAMINOPHEN 7.5; 325 MG/1; MG/1
1 TABLET ORAL ONCE AS NEEDED
Status: DISCONTINUED | OUTPATIENT
Start: 2022-08-23 | End: 2022-08-23 | Stop reason: HOSPADM

## 2022-08-23 RX ORDER — LIDOCAINE HYDROCHLORIDE 20 MG/ML
INJECTION, SOLUTION INFILTRATION; PERINEURAL AS NEEDED
Status: DISCONTINUED | OUTPATIENT
Start: 2022-08-23 | End: 2022-08-23 | Stop reason: SURG

## 2022-08-23 RX ORDER — PROPOFOL 10 MG/ML
VIAL (ML) INTRAVENOUS AS NEEDED
Status: DISCONTINUED | OUTPATIENT
Start: 2022-08-23 | End: 2022-08-23 | Stop reason: SURG

## 2022-08-23 RX ADMIN — ONDANSETRON HYDROCHLORIDE 4 MG: 2 SOLUTION INTRAMUSCULAR; INTRAVENOUS at 07:37

## 2022-08-23 RX ADMIN — RAMIPRIL 5 MG: 5 CAPSULE ORAL at 12:01

## 2022-08-23 RX ADMIN — PROPOFOL 140 MG: 10 INJECTION, EMULSION INTRAVENOUS at 07:33

## 2022-08-23 RX ADMIN — SODIUM CHLORIDE 100 ML/HR: 9 INJECTION, SOLUTION INTRAVENOUS at 20:30

## 2022-08-23 RX ADMIN — OXYCODONE HYDROCHLORIDE AND ACETAMINOPHEN 1 TABLET: 5; 325 TABLET ORAL at 16:13

## 2022-08-23 RX ADMIN — DOCUSATE SODIUM 50MG AND SENNOSIDES 8.6MG 2 TABLET: 8.6; 5 TABLET, FILM COATED ORAL at 20:29

## 2022-08-23 RX ADMIN — LIDOCAINE HYDROCHLORIDE 60 MG: 20 INJECTION, SOLUTION INFILTRATION; PERINEURAL at 07:33

## 2022-08-23 RX ADMIN — LEVOFLOXACIN 500 MG: 500 INJECTION, SOLUTION INTRAVENOUS at 16:10

## 2022-08-23 RX ADMIN — Medication 10 ML: at 20:29

## 2022-08-23 RX ADMIN — DEXAMETHASONE SODIUM PHOSPHATE 10 MG: 10 INJECTION INTRAMUSCULAR; INTRAVENOUS at 07:37

## 2022-08-23 RX ADMIN — SODIUM CHLORIDE 100 ML/HR: 9 INJECTION, SOLUTION INTRAVENOUS at 12:07

## 2022-08-23 RX ADMIN — SODIUM CHLORIDE, POTASSIUM CHLORIDE, SODIUM LACTATE AND CALCIUM CHLORIDE 9 ML/HR: 600; 310; 30; 20 INJECTION, SOLUTION INTRAVENOUS at 06:41

## 2022-08-23 RX ADMIN — AMLODIPINE BESYLATE 2.5 MG: 2.5 TABLET ORAL at 12:01

## 2022-08-23 RX ADMIN — FAMOTIDINE 20 MG: 10 INJECTION, SOLUTION INTRAVENOUS at 07:04

## 2022-08-23 NOTE — ANESTHESIA PROCEDURE NOTES
Airway  Urgency: elective    Date/Time: 8/23/2022 7:36 AM    General Information and Staff    Patient location during procedure: OR  Anesthesiologist: Evelio Lane MD  CRNA/CAA: Cirilo Elise CRNA    Indications and Patient Condition  Indications for airway management: airway protection    Preoxygenated: yes  MILS not maintained throughout  Mask difficulty assessment: 1 - vent by mask    Final Airway Details  Final airway type: supraglottic airway      Successful airway: LMA  Size 4    Number of attempts at approach: 1  Assessment: lips, teeth, and gum same as pre-op

## 2022-08-23 NOTE — PLAN OF CARE
Goal Outcome Evaluation:  Plan of Care Reviewed With: patient        Progress: no change   VSS. SR on montior. 2L via N/C. PCA pump for pain. Npo at midnight. IVF continues. Consent signed for procedure and in chart. Bed alarm on. Bed in low position. Call light in reach.

## 2022-08-23 NOTE — OP NOTE
[facility]  Damien Oliveira MD      URETEROSCOPY LASER LITHOTRIPSY WITH STENT INSERTION  Procedure Note    Madhu Santoro  8/23/2022    Pre-op Diagnosis:   Obstructing left distal ureteral stone with hydronephrosis and abdominal pain    Post-Op Diagnosis Codes:  Same    Procedure(s):  Cysto, left retrograde pyelogram with left uretral stent placement- 30 x 4.8    Surgeon(s):  Damien Oliveira MD    Anesthesia: General    Surgical Assistant: Staff    Staff:   Circulator: Tory Mohan RN  Laser Staff: Aquiles Alonso  Radiology Technologist: Aquiles Stallings  Scrub Person: Lisandro Mcgarry    Findings: Very purulent urine, left hydronephrosis    Description of Procedure: After adequate induction with general anesthesia the patient was placed in the modified supine lithotomy position on the operating table and prepped and draped in a sterile fashion over the genitalia.  Cystoscopy was performed with a 23 Micronesian sheath and 30 degree lens.  Urethra was normal.  Prostate showed only mild to moderate hypertrophy.  Inspection of bladder was essentially normal.    Left retrograde pyelogram was performed and showed a little bit of J hooking of the distal left ureter.  There was a small stone a couple of centimeters up into the ureter which floated back a little further with the retrograde pyelogram.    I had to use a Pollack catheter to maneuver the guidewire up the left ureter to the kidney.  At this point there is a hydronephrotic drip.  The urine was quite purulent.  The Pollack catheter was removed.  Over the guidewire was placed a 30 cm x 4.8 Micronesian double-pigtail ureteral stent without retrieval line.  The scope was then removed.  Xylocaine jelly was placed into the urethra.  The patient was then sent to the recovery room in satisfactory condition.    Postoperatively I spoke to his wife Brooke by phone and explained the procedure in detail and the reasons for placing just a stent.    Complications  none.    Estimated Blood Loss: none    Specimens:  * No orders in the log *      Drains: * No LDAs found *      Complications: None      Damien Oliveira MD     Date: 8/23/2022  Time: 08:23 EDT

## 2022-08-23 NOTE — ADDENDUM NOTE
Addendum  created 08/23/22 0916 by Evelio Lane MD    Attestation recorded in Intraprocedure, Intraprocedure Attestations filed

## 2022-08-23 NOTE — ANESTHESIA PREPROCEDURE EVALUATION
Anesthesia Evaluation     Patient summary reviewed                Airway   Mallampati: II  Neck ROM: full  No difficulty expected  Dental      Pulmonary     breath sounds clear to auscultation  Cardiovascular     ECG reviewed  Rhythm: regular    (+) hypertension, CABG, dysrhythmias (s/p ablation),       Neuro/Psych  (+) CVA,    GI/Hepatic/Renal/Endo    (+)  GERD,  renal disease stones,     Musculoskeletal     Abdominal    Substance History      OB/GYN          Other   arthritis,                      Anesthesia Plan    ASA 3     general       Anesthetic plan, risks, benefits, and alternatives have been provided, discussed and informed consent has been obtained with: patient.    Use of blood products discussed with patient .       CODE STATUS:    Code Status (Patient has no pulse and is not breathing): CPR (Attempt to Resuscitate)  Medical Interventions (Patient has pulse or is breathing): Full Support

## 2022-08-23 NOTE — ANESTHESIA POSTPROCEDURE EVALUATION
Patient: Madhu Santoro    Procedure Summary     Date: 08/23/22 Room / Location: [facility] OR  / [facility] MAIN OR    Anesthesia Start: 0727 Anesthesia Stop: 0823    Procedure: Cysto retrograde with left uretro stent placement (Left ) Diagnosis:     Surgeons: Damien Oliveira MD Provider: Evelio Lane MD    Anesthesia Type: general ASA Status: 3          Anesthesia Type: general    Vitals  Vitals Value Taken Time   /65 08/23/22 0906   Temp 36.9 °C (98.4 °F) 08/23/22 0819   Pulse 61 08/23/22 0911   Resp 16 08/23/22 0835   SpO2 96 % 08/23/22 0911   Vitals shown include unvalidated device data.        Post Anesthesia Care and Evaluation    Patient location during evaluation: bedside  Patient participation: complete - patient participated  Level of consciousness: awake  Pain management: adequate    Airway patency: patent  Anesthetic complications: No anesthetic complications    Cardiovascular status: acceptable  Respiratory status: acceptable  Hydration status: acceptable

## 2022-08-23 NOTE — PROGRESS NOTES
[facility]     LOS: 1 day     Subjective:    CC:Flank Pain    DIET:  Diet Order   Procedures   • Diet Regular     Discussed case Dr. Oliveira  Patient is feeling better  Pain is reasonably controlled  Some nausea    Objective:    Vital Signs:  Temp:  [97.7 °F (36.5 °C)-98.8 °F (37.1 °C)] 98.3 °F (36.8 °C)  Heart Rate:  [63-79] 79  Resp:  [14-18] 16  BP: (131-156)/(59-72) 141/69    SpO2:  [91 %-98 %] 91 %  on  Flow (L/min):  [2-4] 2;   Device (Oxygen Therapy): room air  Body mass index is 26.61 kg/m².    Physical Exam  Constitutional:       Appearance: Normal appearance.   HENT:      Head: Normocephalic and atraumatic.   Cardiovascular:      Rate and Rhythm: Normal rate and regular rhythm.      Heart sounds: No murmur heard.    No friction rub.   Pulmonary:      Effort: Pulmonary effort is normal.      Breath sounds: Normal breath sounds.   Abdominal:      General: Bowel sounds are normal. There is no distension.      Palpations: Abdomen is soft.      Tenderness: There is no abdominal tenderness.   Skin:     General: Skin is warm and dry.   Neurological:      Mental Status: He is alert.   Psychiatric:         Mood and Affect: Mood normal.         Behavior: Behavior normal.         Results Review:    Glucose   Date Value Ref Range Status   08/23/2022 180 (H) 65 - 99 mg/dL Final   08/23/2022 120 (H) 65 - 99 mg/dL Final   08/22/2022 117 (H) 65 - 99 mg/dL Final     Results from last 7 days   Lab Units 08/23/22  1056   WBC 10*3/mm3 12.96*   HEMOGLOBIN g/dL 13.1   HEMATOCRIT % 43.3   PLATELETS 10*3/mm3 508*     Results from last 7 days   Lab Units 08/23/22  1303 08/23/22  1157 08/22/22  0702   SODIUM mmol/L 140   < > 140   POTASSIUM mmol/L 4.2   < > 4.3   CHLORIDE mmol/L 106   < > 104   CO2 mmol/L 21.4*   < > 27.0   BUN mg/dL 11   < > 18   CREATININE mg/dL 0.99   < > 1.21   CALCIUM mg/dL 8.5*   < > 9.5   BILIRUBIN mg/dL  --   --  0.8   ALK PHOS U/L  --   --  106   ALT (SGPT) U/L  --   --  11    AST (SGOT) U/L  --   --  22   GLUCOSE mg/dL 180*   < > 117*    < > = values in this interval not displayed.                 Cultures:  No results found for: BLOODCX, URINECX, WOUNDCX, MRSACX, RESPCX, STOOLCX    I have reviewed daily medications and changes in CPOE    Scheduled meds  amLODIPine, 2.5 mg, Oral, Daily  levoFLOXacin, 500 mg, Intravenous, Q24H  pantoprazole, 40 mg, Oral, Daily  ramipril, 5 mg, Oral, Daily  rosuvastatin, 20 mg, Oral, Daily  senna-docusate sodium, 2 tablet, Oral, BID  sodium chloride, 10 mL, Intravenous, Q12H  tamsulosin, 0.4 mg, Oral, Daily        HYDROmorphone HCl-NaCl, , Last Rate: Stopped (08/23/22 0551)  lactated ringers, 9 mL/hr, Last Rate: 9 mL/hr (08/23/22 0727)  sodium chloride, 100 mL/hr, Last Rate: 100 mL/hr (08/23/22 1207)      PRN meds  senna-docusate sodium **AND** polyethylene glycol **AND** bisacodyl **AND** bisacodyl  •  lactated ringers  •  naloxone  •  nitroglycerin  •  ondansetron **OR** ondansetron  •  oxyCODONE-acetaminophen  •  [COMPLETED] Insert peripheral IV **AND** sodium chloride  •  sodium chloride        Left ureteral stone    Benign essential hypertension    S/P CABG (coronary artery bypass graft)    Obstructive uropathy    Chronic anticoagulation    Facial skin lesion        Assessment/Plan:  Ureteral stone with urinary tract infection  -confimed on ct  -Continue with Levaquin    Hypertension  -Monitor BP  -Continue with Norvasc and ramipril    History of coronary artery disease - status post CABG    Chronic atrial fibrillation  -Eliquis is held for now, likely restart tomorrow if okay with urology    DVT PPX: SCDs      Hayden Caldwell MD  08/23/22  18:15 EDT

## 2022-08-23 NOTE — PLAN OF CARE
Goal Outcome Evaluation:  Plan of Care Reviewed With: patient        Progress: improving  Outcome Evaluation: Pt A&Ox4, able to make needs known, bed alarm in use,, pain med prn x1 this shift, reports pain worse when urinating, education given for pain control, voiding per urinal with frequency and urgency, per care as needed, iv antibiotics as ordered, weaned to room air, diet AAT, iv fluids continuous, slept off and on after return from surgery, no string present at penis, wife visited, possible DC tomorrow

## 2022-08-24 ENCOUNTER — READMISSION MANAGEMENT (OUTPATIENT)
Dept: CALL CENTER | Facility: HOSPITAL | Age: 77
End: 2022-08-24

## 2022-08-24 VITALS
DIASTOLIC BLOOD PRESSURE: 74 MMHG | SYSTOLIC BLOOD PRESSURE: 135 MMHG | RESPIRATION RATE: 18 BRPM | BODY MASS INDEX: 26.52 KG/M2 | HEIGHT: 68 IN | WEIGHT: 175 LBS | TEMPERATURE: 97.5 F | HEART RATE: 61 BPM | OXYGEN SATURATION: 96 %

## 2022-08-24 LAB
ANION GAP SERPL CALCULATED.3IONS-SCNC: 9 MMOL/L (ref 5–15)
BASOPHILS # BLD AUTO: 0.06 10*3/MM3 (ref 0–0.2)
BASOPHILS NFR BLD AUTO: 0.3 % (ref 0–1.5)
BUN SERPL-MCNC: 15 MG/DL (ref 8–23)
BUN/CREAT SERPL: 16.3 (ref 7–25)
CALCIUM SPEC-SCNC: 8.3 MG/DL (ref 8.6–10.5)
CHLORIDE SERPL-SCNC: 110 MMOL/L (ref 98–107)
CO2 SERPL-SCNC: 19 MMOL/L (ref 22–29)
CREAT SERPL-MCNC: 0.92 MG/DL (ref 0.76–1.27)
DEPRECATED RDW RBC AUTO: 45.8 FL (ref 37–54)
EGFRCR SERPLBLD CKD-EPI 2021: 85.7 ML/MIN/1.73
EOSINOPHIL # BLD AUTO: 0 10*3/MM3 (ref 0–0.4)
EOSINOPHIL NFR BLD AUTO: 0 % (ref 0.3–6.2)
ERYTHROCYTE [DISTWIDTH] IN BLOOD BY AUTOMATED COUNT: 17.9 % (ref 12.3–15.4)
GLUCOSE SERPL-MCNC: 143 MG/DL (ref 65–99)
HCT VFR BLD AUTO: 42.1 % (ref 37.5–51)
HGB BLD-MCNC: 12.2 G/DL (ref 13–17.7)
IMM GRANULOCYTES # BLD AUTO: 0.25 10*3/MM3 (ref 0–0.05)
IMM GRANULOCYTES NFR BLD AUTO: 1.4 % (ref 0–0.5)
LYMPHOCYTES # BLD AUTO: 0.86 10*3/MM3 (ref 0.7–3.1)
LYMPHOCYTES NFR BLD AUTO: 4.9 % (ref 19.6–45.3)
MCH RBC QN AUTO: 21.8 PG (ref 26.6–33)
MCHC RBC AUTO-ENTMCNC: 29 G/DL (ref 31.5–35.7)
MCV RBC AUTO: 75.3 FL (ref 79–97)
MONOCYTES # BLD AUTO: 1.95 10*3/MM3 (ref 0.1–0.9)
MONOCYTES NFR BLD AUTO: 11.2 % (ref 5–12)
NEUTROPHILS NFR BLD AUTO: 14.36 10*3/MM3 (ref 1.7–7)
NEUTROPHILS NFR BLD AUTO: 82.2 % (ref 42.7–76)
NRBC BLD AUTO-RTO: 0 /100 WBC (ref 0–0.2)
PLATELET # BLD AUTO: 501 10*3/MM3 (ref 140–450)
PMV BLD AUTO: 10.7 FL (ref 6–12)
POTASSIUM SERPL-SCNC: 4.4 MMOL/L (ref 3.5–5.2)
RBC # BLD AUTO: 5.59 10*6/MM3 (ref 4.14–5.8)
SODIUM SERPL-SCNC: 138 MMOL/L (ref 136–145)
WBC NRBC COR # BLD: 17.48 10*3/MM3 (ref 3.4–10.8)

## 2022-08-24 PROCEDURE — 80048 BASIC METABOLIC PNL TOTAL CA: CPT | Performed by: UROLOGY

## 2022-08-24 PROCEDURE — 85025 COMPLETE CBC W/AUTO DIFF WBC: CPT | Performed by: UROLOGY

## 2022-08-24 RX ORDER — SULFAMETHOXAZOLE AND TRIMETHOPRIM 800; 160 MG/1; MG/1
1 TABLET ORAL 2 TIMES DAILY
Qty: 10 TABLET | Refills: 0 | Status: SHIPPED | OUTPATIENT
Start: 2022-08-24 | End: 2022-08-30

## 2022-08-24 RX ORDER — TAMSULOSIN HYDROCHLORIDE 0.4 MG/1
0.4 CAPSULE ORAL DAILY
Qty: 30 CAPSULE
Start: 2022-08-25 | End: 2022-08-30

## 2022-08-24 RX ADMIN — AMLODIPINE BESYLATE 2.5 MG: 2.5 TABLET ORAL at 08:03

## 2022-08-24 RX ADMIN — ROSUVASTATIN CALCIUM 20 MG: 20 TABLET, FILM COATED ORAL at 08:03

## 2022-08-24 RX ADMIN — SODIUM CHLORIDE 100 ML/HR: 9 INJECTION, SOLUTION INTRAVENOUS at 06:47

## 2022-08-24 RX ADMIN — PANTOPRAZOLE SODIUM 40 MG: 40 TABLET, DELAYED RELEASE ORAL at 08:03

## 2022-08-24 RX ADMIN — RAMIPRIL 5 MG: 5 CAPSULE ORAL at 08:03

## 2022-08-24 RX ADMIN — Medication 10 ML: at 08:03

## 2022-08-24 RX ADMIN — DOCUSATE SODIUM 50MG AND SENNOSIDES 8.6MG 2 TABLET: 8.6; 5 TABLET, FILM COATED ORAL at 08:03

## 2022-08-24 RX ADMIN — TAMSULOSIN HYDROCHLORIDE 0.4 MG: 0.4 CAPSULE ORAL at 08:03

## 2022-08-24 NOTE — CASE MANAGEMENT/SOCIAL WORK
Discharge Planning Assessment  [facility]     Patient Name: Madhu Santoro  MRN: [id number]  Today's Date: 8/24/2022    Admit Date: 8/22/2022     Discharge Needs Assessment     Row Name 08/24/22 0820       Living Environment    People in Home spouse    Name(s) of People in Home  Brooke    Current Living Arrangements home    Primary Care Provided by self    Provides Primary Care For no one    Family Caregiver if Needed spouse    Family Caregiver Names wife Brooke [phone]    Quality of Family Relationships helpful    Able to Return to Prior Arrangements yes       Resource/Environmental Concerns    Resource/Environmental Concerns none    Transportation Concerns none       Transition Planning    Patient/Family Anticipates Transition to home with family    Patient/Family Anticipated Services at Transition none    Transportation Anticipated family or friend will provide       Discharge Needs Assessment    Readmission Within the Last 30 Days no previous admission in last 30 days    Equipment Currently Used at Home none    Concerns to be Addressed denies needs/concerns at this time    Anticipated Changes Related to Illness none    Equipment Needed After Discharge none               Discharge Plan     Row Name 08/24/22 0821       Plan    Plan Return home with spouse    Patient/Family in Agreement with Plan yes    Plan Comments Spoke with patient at bedside.  He lives with wife Brooke [phone], is IADL, uses no DME, has ever used HH or been to SNF.  PCp is Dr. adalberto giron and pharmacy is [facility] on [address].  Patient drives, but states wife can drive or assist if needed.  He plans to return home at [place].  CCP will follow as needed.  Perez SNYDER              Continued Care and Services - Admitted Since 8/22/2022    Coordination has not been started for this encounter.       Expected Discharge Date and Time     Expected Discharge Date Expected Discharge Time    Aug 25, 2022          Demographic Summary      Row Name 08/24/22 0816       General Information    Admission Type inpatient    Arrived From home    Referral Source admission list    Reason for Consult discharge planning    Preferred Language English               Functional Status     Row Name 08/24/22 0816       Functional Status    Usual Activity Tolerance good    Current Activity Tolerance good       Functional Status, IADL    Medications independent    Meal Preparation assistive person;independent  wife    Housekeeping assistive person    Laundry assistive person    Shopping assistive person;independent       Mental Status    General Appearance WDL WDL       Mental Status Summary    Recent Changes in Mental Status/Cognitive Functioning no changes                         Becky S. [place], RN

## 2022-08-24 NOTE — PROGRESS NOTES
[facility]  Damien Oliveira MD     LOS: 2 days   Patient Care Team:  Damian Sanchez MD as PCP - General (Family Medicine)  Jr Temo Cortez MD as Surgeon (Cardiothoracic Surgery)  Netta Mayorga Jr., MD as Consulting Physician (Vascular Surgery)  Damian Sanchez MD as Referring Physician (Family Medicine)  Aquiles Lopez MD as Consulting Physician (Hematology and Oncology)        Subjective     Interval History: POD #2 status post left ureteral stent    Patient Complaints: none  History taken from: patient chart Feels markedly better today.  No pain or discomfort at all.  A little bit of discomfort with urination only.    Review of Systems:   All systems were reviewed and were negative except for no fever chills.  Mild dysuria only.    Objective     Vital Signs  Temp:  [97.5 °F (36.4 °C)-98.6 °F (37 °C)] 97.5 °F (36.4 °C)  Heart Rate:  [59-79] 61  Resp:  [16-22] 18  BP: (135-156)/(69-74) 135/74    Physical Exam:     General Appearance:    Alert, cooperative, in no acute distress   Head:    Normocephalic, without obvious abnormality, atraumatic   Eyes:            Lids and lashes normal, conjunctivae and sclerae normal,     no icterus, no pallor, corneas clear, PERRLA   Ears:    Ears appear intact with no abnormalities noted   Throat:   No oral lesions, no thrush, oral mucosa moist   Neck:   No adenopathy, supple, trachea midline,    Back:    No kyphosis present, no scoliosis present, no skin lesions,    erythema or scars, no tenderness to percussion or                   palpation,   range of motion normal   Lungs:     Clear to auscultation,respirations regular, even and                unlabored    Heart:    Regular rhythm and normal rate, normal S1 and S2, no         murmur, no gallop, no rub, no click   Chest Wall:    No abnormalities observed   Abdomen:     Normal bowel sounds, no masses, no organomegaly, soft     nontender, nondistended, no guarding, no rebound                tenderness    Genital/Rectal:    Deferred   Extremities:   Moves all extremities well, no edema, no cyanosis, no           redness       Skin:   No bleeding, bruising or rash       Neurologic:   Cranial nerves 2 - 12 grossly intact, sensation intact,        Results Review:     I reviewed the patient's new clinical results.  Discussed with The patient and Dr. Caldwell    Recent Results (from the past 24 hour(s))   Basic Metabolic Panel    Collection Time: 08/23/22  1:03 PM    Specimen: Blood   Result Value Ref Range    Glucose 180 (H) 65 - 99 mg/dL    BUN 11 8 - 23 mg/dL    Creatinine 0.99 0.76 - 1.27 mg/dL    Sodium 140 136 - 145 mmol/L    Potassium 4.2 3.5 - 5.2 mmol/L    Chloride 106 98 - 107 mmol/L    CO2 21.4 (L) 22.0 - 29.0 mmol/L    Calcium 8.5 (L) 8.6 - 10.5 mg/dL    BUN/Creatinine Ratio 11.1 7.0 - 25.0    Anion Gap 12.6 5.0 - 15.0 mmol/L    eGFR 78.5 >60.0 mL/min/1.73   Basic Metabolic Panel    Collection Time: 08/24/22  6:13 AM    Specimen: Blood   Result Value Ref Range    Glucose 143 (H) 65 - 99 mg/dL    BUN 15 8 - 23 mg/dL    Creatinine 0.92 0.76 - 1.27 mg/dL    Sodium 138 136 - 145 mmol/L    Potassium 4.4 3.5 - 5.2 mmol/L    Chloride 110 (H) 98 - 107 mmol/L    CO2 19.0 (L) 22.0 - 29.0 mmol/L    Calcium 8.3 (L) 8.6 - 10.5 mg/dL    BUN/Creatinine Ratio 16.3 7.0 - 25.0    Anion Gap 9.0 5.0 - 15.0 mmol/L    eGFR 85.7 >60.0 mL/min/1.73   CBC Auto Differential    Collection Time: 08/24/22  6:13 AM    Specimen: Blood   Result Value Ref Range    WBC 17.48 (H) 3.40 - 10.80 10*3/mm3    RBC 5.59 4.14 - 5.80 10*6/mm3    Hemoglobin 12.2 (L) 13.0 - 17.7 g/dL    Hematocrit 42.1 37.5 - 51.0 %    MCV 75.3 (L) 79.0 - 97.0 fL    MCH 21.8 (L) 26.6 - 33.0 pg    MCHC 29.0 (L) 31.5 - 35.7 g/dL    RDW 17.9 (H) 12.3 - 15.4 %    RDW-SD 45.8 37.0 - 54.0 fl    MPV 10.7 6.0 - 12.0 fL    Platelets 501 (H) 140 - 450 10*3/mm3    Neutrophil % 82.2 (H) 42.7 - 76.0 %    Lymphocyte % 4.9 (L) 19.6 - 45.3 %    Monocyte % 11.2 5.0 - 12.0 %    Eosinophil %  0.0 (L) 0.3 - 6.2 %    Basophil % 0.3 0.0 - 1.5 %    Immature Grans % 1.4 (H) 0.0 - 0.5 %    Neutrophils, Absolute 14.36 (H) 1.70 - 7.00 10*3/mm3    Lymphocytes, Absolute 0.86 0.70 - 3.10 10*3/mm3    Monocytes, Absolute 1.95 (H) 0.10 - 0.90 10*3/mm3    Eosinophils, Absolute 0.00 0.00 - 0.40 10*3/mm3    Basophils, Absolute 0.06 0.00 - 0.20 10*3/mm3    Immature Grans, Absolute 0.25 (H) 0.00 - 0.05 10*3/mm3    nRBC 0.0 0.0 - 0.2 /100 WBC       Medication Review:   Current Facility-Administered Medications:   •  amLODIPine (NORVASC) tablet 2.5 mg, 2.5 mg, Oral, Daily, Damien Oliveira MD, 2.5 mg at 08/24/22 0803  •  sennosides-docusate (PERICOLACE) 8.6-50 MG per tablet 2 tablet, 2 tablet, Oral, BID, 2 tablet at 08/24/22 0803 **AND** polyethylene glycol (MIRALAX) packet 17 g, 17 g, Oral, Daily PRN **AND** bisacodyl (DULCOLAX) EC tablet 5 mg, 5 mg, Oral, Daily PRN **AND** bisacodyl (DULCOLAX) suppository 10 mg, 10 mg, Rectal, Daily PRN, Damien Oliveira MD  •  lactated ringers infusion, 9 mL/hr, Intravenous, Continuous PRN, Damien Oliveira MD, Last Rate: 9 mL/hr at 08/23/22 0727, Restarted at 08/23/22 0815  •  levoFLOXacin (LEVAQUIN) 500 mg/100 mL D5W (premix) (LEVAQUIN) 500 mg, 500 mg, Intravenous, Q24H, Damien Oliveira MD, 500 mg at 08/23/22 1610  •  naloxone (NARCAN) injection 0.1 mg, 0.1 mg, Intravenous, Q5 Min PRN, Damien Oliveira MD  •  nitroglycerin (NITROSTAT) SL tablet 0.4 mg, 0.4 mg, Sublingual, Q5 Min PRN, Damien Oliveira MD  •  ondansetron (ZOFRAN) tablet 4 mg, 4 mg, Oral, Q6H PRN **OR** ondansetron (ZOFRAN) injection 4 mg, 4 mg, Intravenous, Q6H PRN, Damien Oliveira MD, 4 mg at 08/23/22 0737  •  oxyCODONE-acetaminophen (PERCOCET) 5-325 MG per tablet 1 tablet, 1 tablet, Oral, Q4H PRN, Hayden Caldwell MD, 1 tablet at 08/23/22 1613  •  pantoprazole (PROTONIX) EC tablet 40 mg, 40 mg, Oral, Daily, Damien Oliveira MD, 40 mg at 08/24/22 0803  •  ramipril (ALTACE) capsule 5  mg, 5 mg, Oral, Daily, Damien Oliveira MD, 5 mg at 08/24/22 0803  •  rosuvastatin (CRESTOR) tablet 20 mg, 20 mg, Oral, Daily, Damien Oliveira MD, 20 mg at 08/24/22 0803  •  [COMPLETED] Insert peripheral IV, , , Once **AND** sodium chloride 0.9 % flush 10 mL, 10 mL, Intravenous, PRN, Damien Oliveira MD  •  sodium chloride 0.9 % flush 10 mL, 10 mL, Intravenous, Q12H, Damien Oliveira MD, 10 mL at 08/24/22 0803  •  sodium chloride 0.9 % flush 10 mL, 10 mL, Intravenous, PRN, Damien Oliveira MD  •  sodium chloride 0.9 % infusion, 100 mL/hr, Intravenous, Continuous, Damien Oliveira MD, Last Rate: 100 mL/hr at 08/24/22 0647, 100 mL/hr at 08/24/22 0647  •  tamsulosin (FLOMAX) 24 hr capsule 0.4 mg, 0.4 mg, Oral, Daily, Damien Oliveira MD, 0.4 mg at 08/24/22 0803    Assessment & Plan       Left ureteral stone    Benign essential hypertension    S/P CABG (coronary artery bypass graft)    Obstructive uropathy    Chronic anticoagulation    Facial skin lesion      Leukocytosis-felt to be secondary to steroid injection.    Plan for disposition:Patient will be discharged home today on antibiotics.  He has a follow-up appointment in a couple weeks my office.  We will discuss ureteroscopy to get rid of his stone at that time.    Damien Oliveira MD  08/24/22  12:46 EDT      Time: 25 reviewing records, history and physical, discussion of plans.

## 2022-08-24 NOTE — OUTREACH NOTE
Prep Survey    Flowsheet Row Responses   [facility] patient discharged from? [place]   Is LACE score < 7 ? No   Emergency Room discharge w/ pulse ox? No   Eligibility [facility]   Date of Admission 08/22/22   Date of Discharge 08/24/22   Discharge Disposition Home or Self Care   Discharge diagnosis distal left ureteral stone with hydronephrosis,    underwent ureteral stent placement    Does the patient have one of the following disease processes/diagnoses(primary or secondary)? Other   Does the patient have Home health ordered? No   Is there a DME ordered? No   Prep survey completed? Yes          AURORA TILLMAN - Registered Nurse

## 2022-08-24 NOTE — PLAN OF CARE
Goal Outcome Evaluation:  Plan of Care Reviewed With: patient        Progress: improving   VSS. SR-SB on montior. RA. Offered po pain medication but patient refused. Slept for most of the night. IVF continues. Patient encouraged to walk and move around. Bed alarm on. Bed in low position. Call light in reach.

## 2022-08-24 NOTE — DISCHARGE SUMMARY
[facility]    ASSOCIATES  [phone]    DISCHARGE SUMMARY  [facility]    Patient Identification:  Name: Madhu Santoro  Age: 76 y.o.  Sex: male  :  1945  MRN: [id number]  Primary Care Physician: Damian Sanchez MD    Admit date: 2022  Discharge date and time:      Discharge Diagnoses:  Left ureteral stone    Benign essential hypertension    S/P CABG (coronary artery bypass graft)    Obstructive uropathy    Chronic anticoagulation    Facial skin lesion       History of present illness from H&P:    76 year old man with atrial flutter on chronic anticoagulation, hypertension, hyperlipidemia, history of CVA, CAD s/p CABG who presents with left flank pain since yesterday and was found to have a distal left ureteral stone with hydronephrosis    Hospital Course:     Patient was admitted to the hospital and started on empiric antibiotics.  Patient under went ureteral stent placement by Dr. Oliveira.  After placement of the stent patient had purulent drainage.  Postoperatively patient is doing well.  He is anxious for discharge.  He is tolerating oral intake pain is controlled.  Unable to obtain intraoperative cultures.  Case was discussed with Dr. Rodriguez and the patient can be discharged.  Patient's white blood cell count elevated today but this was thought to be secondary to steroids that were given yesterday.    Patient's aspirin and Eliquis were held during hospitalization and he will need to restart these on discharge.    This was discussed with patient's wife as well as patient's nurse and Dr. Oliveira.      The patient was seen and examined on the day of discharge.    Consults:   Consults     Date and Time Order Name Status Description    2022 11:22 AM Inpatient Urology Consult      2022  9:34 AM [facility] (on-call MD unless specified) Details      2022  8:53 AM Urology (on-call MD unless specified) Completed           Results from last 7 days   Lab Units  08/24/22  0613   WBC 10*3/mm3 17.48*   HEMOGLOBIN g/dL 12.2*   HEMATOCRIT % 42.1   PLATELETS 10*3/mm3 501*       Results from last 7 days   Lab Units 08/24/22  0613   SODIUM mmol/L 138   POTASSIUM mmol/L 4.4   CHLORIDE mmol/L 110*   CO2 mmol/L 19.0*   BUN mg/dL 15   CREATININE mg/dL 0.92   GLUCOSE mg/dL 143*   CALCIUM mg/dL 8.3*       Significant Diagnostic Studies:   WBC   Date Value Ref Range Status   08/24/2022 17.48 (H) 3.40 - 10.80 10*3/mm3 Final     Hemoglobin   Date Value Ref Range Status   08/24/2022 12.2 (L) 13.0 - 17.7 g/dL Final     Hematocrit   Date Value Ref Range Status   08/24/2022 42.1 37.5 - 51.0 % Final     Platelets   Date Value Ref Range Status   08/24/2022 501 (H) 140 - 450 10*3/mm3 Final     Sodium   Date Value Ref Range Status   08/24/2022 138 136 - 145 mmol/L Final     Potassium   Date Value Ref Range Status   08/24/2022 4.4 3.5 - 5.2 mmol/L Final     Comment:     Slight hemolysis detected by analyzer. Results may be affected.     Chloride   Date Value Ref Range Status   08/24/2022 110 (H) 98 - 107 mmol/L Final     CO2   Date Value Ref Range Status   08/24/2022 19.0 (L) 22.0 - 29.0 mmol/L Final     BUN   Date Value Ref Range Status   08/24/2022 15 8 - 23 mg/dL Final     Creatinine   Date Value Ref Range Status   08/24/2022 0.92 0.76 - 1.27 mg/dL Final     Glucose   Date Value Ref Range Status   08/24/2022 143 (H) 65 - 99 mg/dL Final     Calcium   Date Value Ref Range Status   08/24/2022 8.3 (L) 8.6 - 10.5 mg/dL Final     AST (SGOT)   Date Value Ref Range Status   08/22/2022 22 1 - 40 U/L Final     ALT (SGPT)   Date Value Ref Range Status   08/22/2022 11 1 - 41 U/L Final     Alkaline Phosphatase   Date Value Ref Range Status   08/22/2022 106 39 - 117 U/L Final     No results found for: APTT, INR  Color, UA   Date Value Ref Range Status   08/22/2022 Dark Yellow (A) Yellow, Straw Final     Appearance, UA   Date Value Ref Range Status   08/22/2022 Cloudy (A) Clear Final     pH, UA   Date Value  Ref Range Status   08/22/2022 <=5.0 5.0 - 8.0 Final     Glucose, UA   Date Value Ref Range Status   08/22/2022 Negative Negative Final     Ketones, UA   Date Value Ref Range Status   08/22/2022 Trace (A) Negative Final     Blood, UA   Date Value Ref Range Status   08/22/2022 Large (3+) (A) Negative Final     Leuk Esterase, UA   Date Value Ref Range Status   08/22/2022 Trace (A) Negative Final     Bilirubin, UA   Date Value Ref Range Status   08/22/2022 Negative Negative Final     Urobilinogen, UA   Date Value Ref Range Status   08/22/2022 1.0 E.U./dL 0.2 - 1.0 E.U./dL Final     RBC, UA   Date Value Ref Range Status   08/22/2022 13-20 (A) None Seen, 0-2 /HPF Final     WBC, UA   Date Value Ref Range Status   08/22/2022 0-2 None Seen, 0-2 /HPF Final     Bacteria, UA   Date Value Ref Range Status   08/22/2022 None Seen None Seen /HPF Final     No results found for: TROPONINT, TROPONINI, BNP  No components found for: HGBA1C;2  No components found for: TSH;2    Imaging Results (All)     Procedure Component Value Units Date/Time    CT Abdomen Pelvis With Contrast [[id number]] Collected: 08/22/22 0852     Updated: 08/23/22 1407    Narrative:      CT OF THE ABDOMEN AND PELVIS WITH CONTRAST 08/22/2022     HISTORY: Left flank pain.     Axial images were obtained from the lung bases to the symphysis pubis  after intravenous contrast. No oral contrast was given.     There is mild left perinephric stranding. Small exophytic left renal  lesion is seen, also seen on the previous study of 02/13/2019 and  probably a slightly hyperdense cyst.     There is mild-to-moderate left hydronephrosis and hydroureter down to an  approximately 6 mm stone in the distal left ureter just proximal to the  left ureterovesical junction.     There is a punctate nonobstructing right renal stone. No right  hydronephrosis is seen. Tiny right renal cyst is seen.     There is a small hiatal hernia.     Gallbladder has been removed. There is mild compensatory  biliary  dilatation in the liver. The spleen appears unremarkable. There is an  approximately 12 mm low-density lesion in the pancreatic head. There is  some additional cystic change in the uncinate process. Please see  additional dictation for the MRI of the abdomen from 08/09/2022.     There is colonic diverticulosis. Urinary bladder and prostate gland  appear normal.     No bowel wall thickening or bowel dilatation is seen.       Impression:      1. Moderate obstructive uropathy on the left from an approximately 6 mm  distal left ureteral stone.  2. Punctate nonobstructing right renal stone.  3. Status post cholecystectomy.  4. Small hiatal hernia.  5. Small cystic lesions in the pancreas. Please see additional dictation  for the MRI of the abdomen from 08/09/2022.  6. Colonic diverticulosis.  7. Report was discussed with the ER practitioner.     Radiation dose reduction techniques were utilized, including automated  exposure control and exposure modulation based on body size.     This report was finalized on 8/23/2022 2:04 PM by Dr. Colt Crenshaw M.D.       FL Retrograde Pyelogram In OR [[id number]] Collected: 08/23/22 0831     Updated: 08/23/22 0850    Narrative:      FLUOROSCOPIC RETROGRADE PYELOGRAM IN OR     CLINICAL: Left distal ureteral calculus.     FLUOROSCOPY TIME: 36 seconds, 7 images     COMPARISON: Correlation with CT abdomen 08/22/2022.     FINDINGS: Preliminary  radiograph excludes the low pelvis and left  distal ureteral calculus. Retrograde filling of the left renal  collecting system demonstrates distal ureteral filling defect.  Cannulization of the left ureter performed. Subsequently a double-J  ureteral stent placed having a satisfactory position.     This report was finalized on 8/23/2022 8:47 AM by Dr. Rajendra Fuentes M.D.         No results found for: SITE, ALLENTEST, PHART, HSU7BPT, PO2ART, IBP4AHK, BASEEXCESS, T3KADBXE, HGBBG, HCTABG, OXYHEMOGLOBI, METHHGBN, CARBOXYHGB, CO2CT,  BAROMETRIC, MODALITY, FIO2       Discharge Medications      New Medications      Instructions Start Date   tamsulosin 0.4 MG capsule 24 hr capsule  Commonly known as: FLOMAX   0.4 mg, Oral, Daily   Start Date: August 25, 2022        Changes to Medications      Instructions Start Date   sulfamethoxazole-trimethoprim 800-160 MG per tablet  Commonly known as: Bactrim DS  What changed:   · medication strength  · how much to take   1 tablet, Oral, 2 Times Daily         Continue These Medications      Instructions Start Date   amLODIPine 2.5 MG tablet  Commonly known as: NORVASC   TAKE ONE TABLET BY MOUTH DAILY      apixaban 5 MG tablet tablet  Commonly known as: ELIQUIS   5 mg, Oral, 2 Times Daily, Lot # [id number] Exp 10/23      Aspirin Low Dose 81 MG EC tablet  Generic drug: aspirin   81 mg, Oral, Daily      ferrous gluconate 324 MG tablet  Commonly known as: FERGON   324 mg, Oral, Daily With Breakfast      HYDROcodone-acetaminophen  MG per tablet  Commonly known as: NORCO   1 tablet, Oral, Every 6 Hours PRN, Take at least 6 hours apart. Max 2/day. 30 day supply      mupirocin 2 % cream  Commonly known as: BACTROBAN   1 application, Topical, 2 Times Daily      Myrbetriq 25 MG tablet sustained-release 24 hour 24 hr tablet  Generic drug: Mirabegron ER   25 mg, Oral, Daily      nitroglycerin 0.4 MG SL tablet  Commonly known as: NITROSTAT   0.4 mg, Sublingual, Every 5 Minutes PRN, Take no more than 3 doses in 15 minutes.       pantoprazole 40 MG EC tablet  Commonly known as: PROTONIX   40 mg, Oral, Daily      ramipril 5 MG capsule  Commonly known as: ALTACE   5 mg, Oral, Daily      rosuvastatin 20 MG tablet  Commonly known as: Crestor   20 mg, Oral, Daily         Stop These Medications    aluminum-magnesium hydroxide-simethicone 400-400-40 MG/5ML suspension  Commonly known as: MAALOX MAX     Movantik 25 MG tablet  Generic drug: Naloxegol Oxalate     sucralfate 1 GM/10ML suspension  Commonly known as: Carafate               Patient Instructions:       Future Appointments   Date Time Provider Department Center   9/21/2022 11:30 AM LAB CHAIR 3 [facility] [place]   9/28/2022  2:00 PM VITALS ONLY [facility] [place]   9/28/2022  2:20 PM Aquiles Lopez MD [facility] [place]   9/28/2022  2:45 PM CHAIR 17 [facility] - [facility] INFUS [facility]   10/7/2022 11:00 AM Christy Luther APRN [facility] PM EAS[facility] [place]   12/19/2022  1:40 PM Kate Ayala APRN [facility] CD [facility] [place]         Follow-up Information     Damian Sanchez MD Follow up in 1 week(s).    Specialty: Family Medicine  Contact information:  [address] [address] [id number] [phone]             Damien Oliveira MD Follow up in 2 week(s).    Specialty: Urology  Contact information:  [address] [facility] [id number] [phone]                         Discharge Order (From admission, onward)     Start     Ordered    08/24/22 1240  Discharge patient  Once        Expected Discharge Date: 08/24/22    Discharge Disposition: Home or Self Care    Physician of Record for Attribution - Please select from Treatment Team: HAYDEN CALDWELL [[id number]]    Review needed by CMO to determine Physician of Record: No       Question Answer Comment   Physician of Record for Attribution - Please select from Treatment Team HAYDEN CALDWELL    Review needed by CMO to determine Physician of Record No        08/24/22 1245                Diet Order   Procedures   • Diet Regular       Discharge instructions:  Follow up with your primary care provider in 1-2 weeks with a cbc and cmp         Total time spent discharging patient including evaluation, post hospitalization follow up,  medication and post hospitalization instructions and education, total time exceeds 30 minutes.    Signed:  Hayden Caldwell MD  8/24/2022  12:59 EDT

## 2022-08-25 ENCOUNTER — TRANSITIONAL CARE MANAGEMENT TELEPHONE ENCOUNTER (OUTPATIENT)
Dept: CALL CENTER | Facility: HOSPITAL | Age: 77
End: 2022-08-25

## 2022-08-25 NOTE — OUTREACH NOTE
Call Center TCM Note    Flowsheet Row Responses   [facility] patient discharged from? [place]   Does the patient have one of the following disease processes/diagnoses(primary or secondary)? Other   TCM attempt successful? Yes   Discharge diagnosis distal left ureteral stone with hydronephrosis,    underwent ureteral stent placement    Is patient permission given to speak with other caregiver? Yes   Person spoke with today (if not patient) and relationship wife Brooke   Is the patient having any side effects they believe may be caused by any medication additions or changes? Yes   Does the patient have all medications ordered at discharge? N/A   Prescription comments No new medications added at 08/24/2022 d/c, several discontinued.    Does the patient have a primary care provider?  Yes   Does the patient have an appointment with their PCP within 7 days of discharge? Yes   Comments regarding PCP TCM APPT with PCP Dr Sanchez is 08/30/2022. Wife has also sched follow up with Urologist Dr Oliveira.    Has the patient kept scheduled appointments due by today? N/A   Has home health visited the patient within 72 hours of discharge? N/A   Psychosocial issues? No   Did the patient receive a copy of their discharge instructions? Yes   Nursing interventions Reviewed instructions with patient   What is the patient's perception of their health status since discharge? Worsening   Is the patient/caregiver able to teach back signs and symptoms related to disease process for when to call PCP? Yes   Is the patient/caregiver able to teach back signs and symptoms related to disease process for when to call 911? Yes   Is the patient/caregiver able to teach back the hierarchy of who to call/visit for symptoms/problems? PCP, Specialist, Home health nurse, Urgent Care, ED, 911 Yes   If the patient is a current smoker, are they able to teach back resources for cessation? Not a smoker   TCM call completed? Yes   Wrap up additional  comments D/C DX: distal left ureteral stone w/hydronephrosis,  stent placement** Pt in bed, wife very concerned. Pt will not get up, or eat/drink anything. Just keeps saying he feels too sick but cannot specify. No emeisis. Pt refused wife on going to ED. Son coming shortly & will assess and get pt back to ED or call Dr Oliveira at least. No new medications added at 08/24/2022 d/c, several discontinued.           Deedee Bernal MA    8/25/2022, 14:44 EDT

## 2022-08-26 NOTE — CASE MANAGEMENT/SOCIAL WORK
Case Management Discharge Note      Final Note: home no needs         Selected Continued Care - Discharged on 8/24/2022 Admission date: 8/22/2022 - Discharge disposition: Home or Self Care    Destination    No services have been selected for the patient.              Durable Medical Equipment    No services have been selected for the patient.              Dialysis/Infusion    No services have been selected for the patient.              Home Medical Care    No services have been selected for the patient.              Therapy    No services have been selected for the patient.              Community Resources    No services have been selected for the patient.              Community & DME    No services have been selected for the patient.                  Transportation Services  Private: Car    Final Discharge Disposition Code: 01 - home or self-care

## 2022-08-29 NOTE — PROGRESS NOTES
Transitional Care Follow Up Visit  Subjective     Madhu Santoro is a 76 y.o. male who presents for a transitional care management visit.    Within 48 business hours after discharge our office contacted him via telephone to coordinate his care and needs.      I reviewed and discussed the details of that call along with the discharge summary, hospital problems, inpatient lab results, inpatient diagnostic studies, and consultation reports with Madhu.     Current outpatient and discharge medications have been reconciled for the patient.  Reviewed by: Damian Sanchez MD      Date of TCM Phone Call 8/24/2022   [facility]   Date of Admission 8/22/2022   Date of Discharge 8/24/2022   Discharge Disposition Home or Self Care     Risk for Readmission (LACE) Score: 7 (8/24/2022  6:00 AM)      History of Present Illness   Course During Hospital Stay:      Admit date: 8/22/2022  Discharge date and time:       Discharge Diagnoses:  Left ureteral stone    Benign essential hypertension    S/P CABG (coronary artery bypass graft)    Obstructive uropathy    Chronic anticoagulation    Facial skin lesion        History of present illness from H&P:     76 year old man with atrial flutter on chronic anticoagulation, hypertension, hyperlipidemia, history of CVA, CAD s/p CABG who presents with left flank pain since yesterday and was found to have a distal left ureteral stone with hydronephrosis     Hospital Course:      Patient was admitted to the hospital and started on empiric antibiotics.  Patient under went ureteral stent placement by Dr. Oliveira.  After placement of the stent patient had purulent drainage.  Postoperatively patient is doing well.  He is anxious for discharge.  He is tolerating oral intake pain is controlled.  Unable to obtain intraoperative cultures.  Case was discussed with Dr. Rodriguez and the patient can be discharged.  Patient's white blood cell count elevated today but this was thought to be  "secondary to steroids that were given yesterday.     Patient's aspirin and Eliquis were held during hospitalization and he will need to restart these on discharge.     This was discussed with patient's wife as well as patient's nurse and Dr. Oliveira.     The following portions of the patient's history were reviewed and updated as appropriate: allergies, current medications, past family history, past medical history, past social history, past surgical history and problem list.    Review of Systems   Constitutional: Negative for activity change, chills and fever.   Respiratory: Negative for cough.    Cardiovascular: Negative for chest pain.   Psychiatric/Behavioral: Negative for dysphoric mood.       /52   Pulse 58   Temp 98.4 °F (36.9 °C) (Oral)   Resp 14   Ht 172.7 cm (68\")   Wt 78.9 kg (174 lb)   BMI 26.46 kg/m²     Objective   Physical Exam  Constitutional:       General: He is not in acute distress.     Appearance: He is well-developed.   Cardiovascular:      Rate and Rhythm: Normal rate and regular rhythm.   Pulmonary:      Effort: Pulmonary effort is normal.      Breath sounds: Normal breath sounds.   Neurological:      Mental Status: He is alert and oriented to person, place, and time.   Psychiatric:         Behavior: Behavior normal.         Thought Content: Thought content normal.     Hospital records reviewed with pt confirming HPI.      Assessment & Plan   Diagnoses and all orders for this visit:    1. Left ureteral stone (Primary)    2. Hospital discharge follow-up    Pt encouraged to keep his urology f/u appts and to hydrate well. Pt seeing Pain Mgmt and is encouraged to reach out to them for possibly increased pain medication quantity until the stent is pulled and he's better. Pt ha appt with his hematologist upcoming for iron infusions.              "

## 2022-08-30 ENCOUNTER — OFFICE VISIT (OUTPATIENT)
Dept: FAMILY MEDICINE CLINIC | Facility: CLINIC | Age: 77
End: 2022-08-30

## 2022-08-30 VITALS
TEMPERATURE: 98.4 F | DIASTOLIC BLOOD PRESSURE: 52 MMHG | HEIGHT: 68 IN | SYSTOLIC BLOOD PRESSURE: 127 MMHG | HEART RATE: 58 BPM | BODY MASS INDEX: 26.37 KG/M2 | WEIGHT: 174 LBS | RESPIRATION RATE: 14 BRPM

## 2022-08-30 DIAGNOSIS — Z09 HOSPITAL DISCHARGE FOLLOW-UP: ICD-10-CM

## 2022-08-30 DIAGNOSIS — N20.1 LEFT URETERAL STONE: Primary | ICD-10-CM

## 2022-08-30 PROCEDURE — 99212 OFFICE O/P EST SF 10 MIN: CPT | Performed by: FAMILY MEDICINE

## 2022-09-01 ENCOUNTER — HOSPITAL ENCOUNTER (EMERGENCY)
Facility: HOSPITAL | Age: 77
Discharge: HOME | End: 2022-09-02
Attending: EMERGENCY MEDICINE | Admitting: EMERGENCY MEDICINE

## 2022-09-01 DIAGNOSIS — T14.8XXA BLEEDING FROM WOUND: Primary | ICD-10-CM

## 2022-09-01 DIAGNOSIS — Z79.01 ANTICOAGULATED: ICD-10-CM

## 2022-09-01 PROCEDURE — 99283 EMERGENCY DEPT VISIT LOW MDM: CPT

## 2022-09-01 RX ORDER — TRANEXAMIC ACID 100 MG/ML
500 INJECTION, SOLUTION INTRAVENOUS ONCE
Status: DISCONTINUED | OUTPATIENT
Start: 2022-09-01 | End: 2022-09-01

## 2022-09-01 RX ORDER — TRANEXAMIC ACID 100 MG/ML
500 INJECTION, SOLUTION INTRAVENOUS ONCE
Status: COMPLETED | OUTPATIENT
Start: 2022-09-01 | End: 2022-09-01

## 2022-09-01 RX ADMIN — TRANEXAMIC ACID 500 MG: 100 INJECTION, SOLUTION INTRAVENOUS at 23:38

## 2022-09-01 RX ADMIN — SILVER NITRATE APPLICATORS 1 APPLICATION: 25; 75 STICK TOPICAL at 23:14

## 2022-09-02 VITALS
HEART RATE: 52 BPM | BODY MASS INDEX: 26.37 KG/M2 | OXYGEN SATURATION: 94 % | TEMPERATURE: 97.4 F | DIASTOLIC BLOOD PRESSURE: 59 MMHG | RESPIRATION RATE: 16 BRPM | HEIGHT: 68 IN | WEIGHT: 174 LBS | SYSTOLIC BLOOD PRESSURE: 127 MMHG

## 2022-09-02 NOTE — ED PROVIDER NOTES
EMERGENCY DEPARTMENT ENCOUNTER    Room Number:  39/39  Date of encounter:  9/2/2022  PCP: Damian Sanchez MD  Historian: Patient     I used full protective equipment while examining this patient.  This includes face mask, gloves and protective eyewear.  I washed my hands before entering the room and immediately upon leaving the room.  Patient was wearing a surgical mask.      HPI:  Chief Complaint: Bleeding from right forearm  A complete HPI/ROS/PMH/PSH/SH/FH are unobtainable due to: None    Context: Madhu Santoro is a 76 y.o. male who presents to the ED c/o bleeding from his right forearm.  Patient had a skin cancer removed from his right forearm this afternoon.  He reports continued  bleeding from this area since then.  He states that the dermatologist cauterized the area.  He takes Eliquis.  Denies chest pain, dizziness, shortness of breath, or syncope.  Denies numbness/tingling in his right arm/hand.    PAST MEDICAL HISTORY  Active Ambulatory Problems     Diagnosis Date Noted   • Anxiety    • Arthritis    • Chronic coronary artery disease    • HLD (hyperlipidemia)    • Benign essential hypertension    • DDD (degenerative disc disease), lumbosacral    • Cerebrovascular accident (HCC)    • Encounter for screening for cardiovascular disorders 11/20/2012   • Gastroesophageal reflux disease 04/04/2016   • Hyperlipidemia 04/04/2016   • Stenosis of carotid artery 04/26/2017   • Former smoker 04/26/2017   • History of cardiac catheterization 12/16/2011   • S/P ablation of atrial flutter 04/26/2017   • Typical atrial flutter ([facility]) 04/26/2017   • S/P CABG (coronary artery bypass graft) 04/26/2017   • Adenomatous polyp of ascending colon 02/12/2019   • Abdominal bloating 02/12/2019   • Stroke ([facility]) 03/29/2019   • PAD (peripheral artery disease) ([facility]) 03/29/2019   • Acute cholecystitis 09/06/2019   • Right upper quadrant abdominal pain 09/06/2019   • Chronic pain of both hips 03/31/2021   • Chronic bilateral low back  pain without sciatica 03/31/2021   • Sacroiliac joint dysfunction of both sides 03/31/2021   • Encounter for long-term (current) use of high-risk medication 03/31/2021   • Lumbar facet arthropathy 03/31/2021   • Stroke-like symptoms 04/10/2021   • CVA (cerebral vascular accident) (HCC) 04/11/2021   • Personal history of colonic polyps 07/23/2021   • Arthralgia of multiple joints 09/28/2021   • Iron deficiency 08/16/2022   • Thrombocytosis 08/16/2022   • Left ureteral stone 08/22/2022   • Obstructive uropathy 08/22/2022   • Chronic anticoagulation 08/22/2022   • Facial skin lesion 08/22/2022     Resolved Ambulatory Problems     Diagnosis Date Noted   • No Resolved Ambulatory Problems     Past Medical History:   Diagnosis Date   • Atrial flutter (HCC)    • Mandel esophagus    • CAD (coronary artery disease)    • Carotid artery disease (HCC)    • Colonic polyp    • Cyst of pancreas    • GERD (gastroesophageal reflux disease)    • H/O bone density study 2013   • H/O complete eye exam 2014   • History of kidney stone    • Hypertension    • Kidney stone    • Lipid screening 05/31/2013   • Low back pain    • Screening for prostate cancer 07/07/2015   • Skin cancer          PAST SURGICAL HISTORY  Past Surgical History:   Procedure Laterality Date   • CARDIAC CATHETERIZATION N/A 04/10/2017    Procedure: Left Heart Cath;  Surgeon: Marjorie Healy MD;  Location: [facility] CATH INVASIVE LOCATION;  Service:    • CARDIAC CATHETERIZATION N/A 04/10/2017    Procedure: Coronary angiography;  Surgeon: Marjorie Healy MD;  Location: [facility] CATH INVASIVE LOCATION;  Service:    • CARDIAC CATHETERIZATION N/A 04/10/2017    Procedure: Left ventriculography;  Surgeon: Marjorie Healy MD;  Location: [facility] CATH INVASIVE LOCATION;  Service:    • CARDIAC CATHETERIZATION  2011   • CARDIAC ELECTROPHYSIOLOGY PROCEDURE N/A 04/18/2017    Procedure: Ablation atrial flutter;  Surgeon: Jose Antonio Gustafson MD;  Location: [facility] CATH INVASIVE LOCATION;   Service:    • CAROTID ENDARTERECTOMY     • CHOLECYSTECTOMY     • CHOLECYSTECTOMY WITH INTRAOPERATIVE CHOLANGIOGRAM N/A 09/07/2019    Procedure: Laparoscopic cholecystectomy with intraoperative cholangiogram;  Surgeon: Gauri Travis MD;  Location: [facility] MAIN OR;  Service: General   • COLONOSCOPY  01/06/2015    Diverticulosis, one TA   • COLONOSCOPY N/A 02/14/2019    tics, NBIH, adenomatous polyp x 2   • COLONOSCOPY N/A 11/05/2021    Procedure: COLONOSCOPY TO CECUM AND TERM. ILEUM WITH COLD POLYPECTOMIES;  Surgeon: Everton Abel MD;  Location: [facility] ENDOSCOPY;  Service: Gastroenterology;  Laterality: N/A;  PRE OP - PERS H/O POLYPS  POST OP - COLON POLYPS,, DIVERTICULOSIS, HEMORRHOIDS   • CORONARY ARTERY BYPASS GRAFT N/A 04/11/2017    Procedure: AR STERNOTOMY CORONARY ARTERY BYPASS GRAFT TIMES 3 USING LEFT INTERNAL MAMMARY ARTERY AND LEFT GREATER SAPHENOUS VEIN GRAFT PER ENDOSCOPIC VEIN HARVESTING AND PRP ;  Surgeon: Temo Cortez MD;  Location: [facility] MAIN OR;  Service:    • ENDOSCOPY  01/06/2015    HH, Ervin's esophagus   • ENDOSCOPY N/A 02/14/2019    Z line irregular, HH, Ervin's esophagus   • ENDOSCOPY N/A 11/05/2021    Procedure: ESOPHAGOGASTRODUODENOSCOPY WITH BIOPSIES;  Surgeon: Everton Abel MD;  Location: [facility] ENDOSCOPY;  Service: Gastroenterology;  Laterality: N/A;  PRE OP - PERS H/O ERVIN'S  POST OP - IRREG Z LINE   • KNEE SURGERY Left    • VASECTOMY           FAMILY HISTORY  Family History   Problem Relation Age of Onset   • Hypertension Mother    • Heart disease Mother    • Heart attack Mother    • Stroke Mother    • Heart disease Father    • Heart attack Father    • Stroke Father    • Hypertension Sister    • Heart attack Brother    • Heart disease Brother    • No Known Problems Brother    • Heart disease Brother    • Heart attack Brother    • Diabetes Brother    • No Known Problems Maternal Grandmother    • No Known Problems Maternal Grandfather    • No Known Problems Paternal  Grandmother    • No Known Problems Paternal Grandfather    • Pancreatic cancer Paternal Cousin    • Arthritis Other    • Diabetes Other    • Hypertension Other    • Kidney disease Other         stones   • Malig Hyperthermia Neg Hx          SOCIAL HISTORY  Social History     Socioeconomic History   • Marital status:      Spouse name: Brooke   • Years of education: 9th grade   Tobacco Use   • Smoking status: Former Smoker     Packs/day: 1.00     Types: Cigarettes     Start date: 1959     Quit date: 2009     Years since quittin.6   • Smokeless tobacco: Never Used   • Tobacco comment: CAFFEINE USE   Vaping Use   • Vaping Use: Never used   Substance and Sexual Activity   • Alcohol use: Yes     Comment: rarely   • Drug use: No   • Sexual activity: Yes     Partners: Female         ALLERGIES  Atorvastatin and Penicillins       REVIEW OF SYSTEMS  Review of Systems      All systems have been reviewed and are negative except as as discussed in the HPI    PHYSICAL EXAM    I have reviewed the triage vital signs and nursing notes.    ED Triage Vitals [22 2155]   Temp Heart Rate Resp BP SpO2   97.4 °F (36.3 °C) 65 16 157/66 99 %      Temp src Heart Rate Source Patient Position BP Location FiO2 (%)   Tympanic Monitor Standing Left arm --       Physical Exam  GENERAL: Awake, alert, oriented x3.  Well-developed, well-nourished elderly male.  HENT: NCAT, nares patent  EYES:  no scleral icterus  CV: regular rhythm, regular rate  RESPIRATORY: normal effort, clear to auscultation bilaterally  ABDOMEN: soft, nontender  MUSCULOSKELETAL: Extremities are nontender and without obvious deformity.    NEURO: Normal strength and light touch sensation in the right upper extremity  SKIN: There is a superficial wound on the dorsal aspect of the mid right forearm with active bleeding  PSYCH: Normal mood and affect      LAB RESULTS  No results found for this or any previous visit (from the past 24 hour(s)).    Ordered the  above labs and independently reviewed the results.      RADIOLOGY  No Radiology Exams Resulted Within Past 24 Hours    I ordered the above noted radiological studies. Reviewed by me and discussed with radiologist.  See dictation for official radiology interpretation.      PROCEDURES  Procedures      MEDICATIONS GIVEN IN ER    Medications   silver nitrate 75-25 % applicator 1 application (1 application Topical Given 9/1/22 2314)   Tranexamic Acid Sterile Solution 500 mg (500 mg Topical Given 9/1/22 2338)         PROGRESS, DATA ANALYSIS, CONSULTS, AND MEDICAL DECISION MAKING    All labs have been independently reviewed by me.  All radiology studies have been reviewed by me and discussed with radiologist dictating the report.   EKG's independently viewed and interpreted by me.  I have reviewed the nurse's notes, vital signs, past medical history, and medication list.  Discussion below represents my analysis of pertinent findings related to patient's condition, differential diagnosis, treatment plan and final disposition.      ED Course as of 09/02/22 1732   Thu Sep 01, 2022   2230 Surgicel and pressure dressing will be placed on the right forearm [WH]   2314 Pressure dressing was removed and there is still active oozing of blood.  I cauterized the area extensively and have reapplied Surgicel and a pressure dressing. [WH]   2337 Pressure dressing was removed.  Bleeding has stopped.  I soaked some 2 x 2's and tranexamic acid and placed them on top of the Surgicel.  4 x 4's were placed on top of this and then a new Ace wrap was applied.  Patient was advised to leave this dressing in place for least 12 hours.  He was advised to return to the ED if he sees blood soaking through the bandage. [WH]      ED Course User Index  [WH] Lisandro Martínez MD       AS OF 17:32 EDT VITALS:    BP - 127/59  HR - 52  TEMP - 97.4 °F (36.3 °C)  O2 SATS - 94%      DIAGNOSIS  Final diagnoses:   Bleeding from wound   Anticoagulated          DISPOSITION  DISCHARGE    Patient discharged in stable condition.    Reviewed implications of results, diagnosis, meds, responsibility to follow up, warning signs and symptoms of possible worsening, potential complications and reasons to return to ER, including persistent bleeding, dizziness, shortness of breath, fainting, or other concern.    Patient/Family voiced understanding of above instructions.    Discussed plan for discharge, as there is no emergent indication for admission. Patient referred to primary care provider for BP management due to today's BP. Pt/family is agreeable and understands need for follow up and repeat testing.  Pt is aware that discharge does not mean that nothing is wrong but it indicates no emergency is present that requires admission and they must continue care with follow-up as given below or physician of their choice.     FOLLOW-UP  Damian Sanchez MD  [id number] [id number] [phone]               Medication List      No changes were made to your prescriptions during this visit.           Dictated utilizing Dragon dictation     Lisandro Martínez MD  09/02/22 5641

## 2022-09-02 NOTE — ED NOTES
Pt ambulatory to triage, states had skin cancer removed from right arm today, and it won't stop bleeding.  Pt has 2 wounds, one to right mid arm, the other to right elbow.  Pt is on blood thinners.  Pt rebandaged.  Pt wearing mask in triage. Triage personnel wore appropriate PPE

## 2022-09-02 NOTE — DISCHARGE INSTRUCTIONS
Leave dressing and Ace wrap on for at least 12 hours.  Return to the emergency department if there is bleeding through your bandage.   Pap order faxed to [address] on 12/02/2021

## 2022-09-06 DIAGNOSIS — M54.50 CHRONIC BILATERAL LOW BACK PAIN WITHOUT SCIATICA: ICD-10-CM

## 2022-09-06 DIAGNOSIS — G89.29 CHRONIC BILATERAL LOW BACK PAIN WITHOUT SCIATICA: ICD-10-CM

## 2022-09-06 DIAGNOSIS — M25.552 CHRONIC PAIN OF BOTH HIPS: ICD-10-CM

## 2022-09-06 DIAGNOSIS — G89.29 CHRONIC PAIN OF BOTH HIPS: ICD-10-CM

## 2022-09-06 DIAGNOSIS — M25.551 CHRONIC PAIN OF BOTH HIPS: ICD-10-CM

## 2022-09-06 RX ORDER — HYDROCODONE BITARTRATE AND ACETAMINOPHEN 10; 325 MG/1; MG/1
1 TABLET ORAL EVERY 6 HOURS PRN
Qty: 60 TABLET | Refills: 0 | Status: SHIPPED | OUTPATIENT
Start: 2022-09-06 | End: 2022-10-03 | Stop reason: SDUPTHER

## 2022-09-06 NOTE — TELEPHONE ENCOUNTER
Medication Refill Request    Date of phone call: 9/06/2022    Medication being requested: Norco  mg sig: Take 1 tablet by mouth Every 6 (Six) Hours As Needed for Moderate Pain   Qty: 60    Date of last visit: 8/08/2022    Date of last refill:     RUTH up to date?:     Next Follow up?: 10/07/2022    Any new pertinent information? (i.e, new medication allergies, new use of medications, change in patient's health or condition, non-compliance or inconsistency with prescribing agreement?):

## 2022-09-09 ENCOUNTER — TELEPHONE (OUTPATIENT)
Dept: CARDIOLOGY | Facility: CLINIC | Age: 77
End: 2022-09-09

## 2022-09-09 ENCOUNTER — READMISSION MANAGEMENT (OUTPATIENT)
Dept: CALL CENTER | Facility: HOSPITAL | Age: 77
End: 2022-09-09

## 2022-09-09 NOTE — TELEPHONE ENCOUNTER
Pt is needing cardiac clearance to have ureteroscopy laser litho stent on 9/22/22.  Pt is needing to hold his Eliquis for 7-10 days     Pt was last seen by Dr. Wright on 6/16/22    I have placed form in your orange folder at the main office for signature    Phone: [phone]

## 2022-09-09 NOTE — TELEPHONE ENCOUNTER
Jaime,    He is clear from a cardiac standpoint at low to moderate risk.  He can hold the Eliquis 4 days prior and several days after if needed.  7-10 days is too long to be off of Eliquis.  Thanks     MARYBEL

## 2022-09-09 NOTE — OUTREACH NOTE
Medical Week 3 Survey    Flowsheet Row Responses   [facility] patient discharged from? [facility]   Does the patient have one of the following disease processes/diagnoses(primary or secondary)? Other   Week 3 attempt successful? Yes   Call start time 1748   Call end time 1751   Discharge diagnosis distal left ureteral stone with hydronephrosis,    underwent ureteral stent placement    Meds reviewed with patient/caregiver? Yes   Is the patient having any side effects they believe may be caused by any medication additions or changes? No   Does the patient have all medications ordered at discharge? Yes   Is the patient taking all medications as directed (includes completed medication regime)? Yes   Does the patient have a primary care provider?  Yes   Does the patient have an appointment with their PCP within 7 days of discharge? Yes   Has the patient kept scheduled appointments due by today? Yes   Has home health visited the patient within 72 hours of discharge? N/A   Psychosocial issues? No   Did the patient receive a copy of their discharge instructions? Yes   Nursing interventions Reviewed instructions with patient   What is the patient's perception of their health status since discharge? Improving   Is the patient/caregiver able to teach back signs and symptoms related to disease process for when to call PCP? Yes   Is the patient/caregiver able to teach back signs and symptoms related to disease process for when to call 911? Yes   Is the patient/caregiver able to teach back the hierarchy of who to call/visit for symptoms/problems? PCP, Specialist, Home health nurse, Urgent Care, ED, 911 Yes   Additional teach back comments states has kidney stone pain, afebrile, surgery to be scheduled by next month for kidney stone removal   Week 3 Call Completed? Yes          JEANINE MARTÍNEZ - Registered Nurse

## 2022-09-21 ENCOUNTER — APPOINTMENT (OUTPATIENT)
Dept: LAB | Facility: HOSPITAL | Age: 77
End: 2022-09-21

## 2022-09-21 ENCOUNTER — LAB (OUTPATIENT)
Dept: LAB | Facility: HOSPITAL | Age: 77
End: 2022-09-21

## 2022-09-21 DIAGNOSIS — Z79.01 CHRONIC ANTICOAGULATION: Primary | ICD-10-CM

## 2022-09-21 DIAGNOSIS — E61.1 IRON DEFICIENCY: ICD-10-CM

## 2022-09-21 LAB
BASOPHILS # BLD AUTO: 0.06 10*3/MM3 (ref 0–0.2)
BASOPHILS NFR BLD AUTO: 0.5 % (ref 0–1.5)
DEPRECATED RDW RBC AUTO: 48.9 FL (ref 37–54)
EOSINOPHIL # BLD AUTO: 0.44 10*3/MM3 (ref 0–0.4)
EOSINOPHIL NFR BLD AUTO: 3.8 % (ref 0.3–6.2)
ERYTHROCYTE [DISTWIDTH] IN BLOOD BY AUTOMATED COUNT: 19.9 % (ref 12.3–15.4)
FERRITIN SERPL-MCNC: 12.5 NG/ML (ref 30–400)
HCT VFR BLD AUTO: 43.2 % (ref 37.5–51)
HGB BLD-MCNC: 12.9 G/DL (ref 13–17.7)
IMM GRANULOCYTES # BLD AUTO: 0.1 10*3/MM3 (ref 0–0.05)
IMM GRANULOCYTES NFR BLD AUTO: 0.9 % (ref 0–0.5)
IRON 24H UR-MRATE: 23 MCG/DL (ref 59–158)
IRON SATN MFR SERPL: 5 % (ref 14–48)
LYMPHOCYTES # BLD AUTO: 1.35 10*3/MM3 (ref 0.7–3.1)
LYMPHOCYTES NFR BLD AUTO: 11.7 % (ref 19.6–45.3)
MCH RBC QN AUTO: 21.8 PG (ref 26.6–33)
MCHC RBC AUTO-ENTMCNC: 29.9 G/DL (ref 31.5–35.7)
MCV RBC AUTO: 73 FL (ref 79–97)
MONOCYTES # BLD AUTO: 1.27 10*3/MM3 (ref 0.1–0.9)
MONOCYTES NFR BLD AUTO: 11.1 % (ref 5–12)
NEUTROPHILS NFR BLD AUTO: 72 % (ref 42.7–76)
NEUTROPHILS NFR BLD AUTO: 8.27 10*3/MM3 (ref 1.7–7)
NRBC BLD AUTO-RTO: 0 /100 WBC (ref 0–0.2)
PLATELET # BLD AUTO: 588 10*3/MM3 (ref 140–450)
PMV BLD AUTO: 10.5 FL (ref 6–12)
RBC # BLD AUTO: 5.92 10*6/MM3 (ref 4.14–5.8)
TIBC SERPL-MCNC: 428 MCG/DL (ref 249–505)
TRANSFERRIN SERPL-MCNC: 306 MG/DL (ref 200–360)
WBC NRBC COR # BLD: 11.49 10*3/MM3 (ref 3.4–10.8)

## 2022-09-21 PROCEDURE — 36415 COLL VENOUS BLD VENIPUNCTURE: CPT

## 2022-09-21 PROCEDURE — 82728 ASSAY OF FERRITIN: CPT

## 2022-09-21 PROCEDURE — 85025 COMPLETE CBC W/AUTO DIFF WBC: CPT

## 2022-09-21 PROCEDURE — 83540 ASSAY OF IRON: CPT

## 2022-09-21 PROCEDURE — 84466 ASSAY OF TRANSFERRIN: CPT

## 2022-09-23 ENCOUNTER — TELEPHONE (OUTPATIENT)
Dept: PAIN MEDICINE | Facility: CLINIC | Age: 77
End: 2022-09-23

## 2022-09-23 PROBLEM — D50.9 IRON DEFICIENCY ANEMIA: Status: ACTIVE | Noted: 2022-09-23

## 2022-09-23 NOTE — TELEPHONE ENCOUNTER
"Just got off the phone with [facility] pharmacy and pharmacy tech stated there is no script on file for norco 5-325 mg , last script of norco 5-325 mg picked up was back on 02/06/2021. I called  to clarify where script was sent to , he said script was sent to [facility] on [address] which is where I called .  stated the following;ill continue taking  mg 2 times a day, ill leave it alone \". "

## 2022-09-23 NOTE — TELEPHONE ENCOUNTER
"Per patient he didn't  script Norco  5-325 mg  that was prescribed by . States he takes norco  mg one tab in the morning and one tab at night . Also said he has enough medication to last him up until upcoming OV. \"I guess I will just tough it up but I didn't get that RX because im already taking what you guys prescribe \"."

## 2022-09-23 NOTE — TELEPHONE ENCOUNTER
Provider: ANITRA EWING    Caller: PATIENT    Relationship to Patient: SELF    Pharmacy: [facility] [phone]    Phone Number:  [phone] OR WIFE ALLEN- [phone]     Reason for Call: PT. STATES THAT HE SEES DR. ROBBINS FOR HIS KIDNEY STONES.   HE HAD A LITHOTRIPSY PROCEDURE DONE YESTERDAY.   HE CURRENTLY TAKES HYDROCODONE  MG. PRESCRIBED BY PAIN MANAGEMENT.   HE STATES THAT DR. ROBBINS JUST PRESCRIBED HYDROCODONE 5-325 MG.   PT. NOT SURE IF HE IS SUPPOSED TO TAKE BOTH. ?  STATES THAT HE IS IN A LOT OF PAIN AND THE HYDROCODONE THAT HE HAS IS NOT HELPING.  ASKING FOR CALL BACK FROM OFFICE PLEASE.

## 2022-09-23 NOTE — TELEPHONE ENCOUNTER
Patient informed verbally understood . Also stated if he increases dose he will probably run out of medications before his upcoming OV .

## 2022-09-23 NOTE — TELEPHONE ENCOUNTER
Will you call the pharmacy and find out the sig on the Norco 5/325. It may benefit him to be using that instead of our prescription based on how it is prescribed to take.

## 2022-09-28 ENCOUNTER — APPOINTMENT (OUTPATIENT)
Dept: LAB | Facility: HOSPITAL | Age: 77
End: 2022-09-28

## 2022-09-28 ENCOUNTER — OFFICE VISIT (OUTPATIENT)
Dept: ONCOLOGY | Facility: CLINIC | Age: 77
End: 2022-09-28

## 2022-09-28 ENCOUNTER — INFUSION (OUTPATIENT)
Dept: ONCOLOGY | Facility: HOSPITAL | Age: 77
End: 2022-09-28

## 2022-09-28 VITALS
OXYGEN SATURATION: 98 % | TEMPERATURE: 98 F | RESPIRATION RATE: 16 BRPM | SYSTOLIC BLOOD PRESSURE: 138 MMHG | WEIGHT: 168.2 LBS | DIASTOLIC BLOOD PRESSURE: 69 MMHG | HEART RATE: 51 BPM | HEIGHT: 68 IN | BODY MASS INDEX: 25.49 KG/M2

## 2022-09-28 DIAGNOSIS — D50.9 IRON DEFICIENCY ANEMIA, UNSPECIFIED IRON DEFICIENCY ANEMIA TYPE: Primary | ICD-10-CM

## 2022-09-28 DIAGNOSIS — E61.1 IRON DEFICIENCY: ICD-10-CM

## 2022-09-28 PROCEDURE — 25010000002 FERRIC CARBOXYMALTOSE 750 MG/15ML SOLUTION 15 ML VIAL: Performed by: INTERNAL MEDICINE

## 2022-09-28 PROCEDURE — 96374 THER/PROPH/DIAG INJ IV PUSH: CPT

## 2022-09-28 PROCEDURE — 63710000001 PROCHLORPERAZINE MALEATE PER 10 MG: Performed by: INTERNAL MEDICINE

## 2022-09-28 PROCEDURE — 99214 OFFICE O/P EST MOD 30 MIN: CPT | Performed by: INTERNAL MEDICINE

## 2022-09-28 RX ORDER — PROCHLORPERAZINE MALEATE 10 MG
10 TABLET ORAL ONCE
Status: COMPLETED | OUTPATIENT
Start: 2022-09-28 | End: 2022-09-28

## 2022-09-28 RX ORDER — SODIUM CHLORIDE 9 MG/ML
250 INJECTION, SOLUTION INTRAVENOUS ONCE
Status: CANCELLED | OUTPATIENT
Start: 2022-09-28

## 2022-09-28 RX ORDER — PROCHLORPERAZINE MALEATE 10 MG
10 TABLET ORAL ONCE
Status: CANCELLED | OUTPATIENT
Start: 2022-09-28 | End: 2022-09-28

## 2022-09-28 RX ORDER — SODIUM CHLORIDE 9 MG/ML
250 INJECTION, SOLUTION INTRAVENOUS ONCE
Status: COMPLETED | OUTPATIENT
Start: 2022-09-28 | End: 2022-09-28

## 2022-09-28 RX ORDER — CEPHALEXIN 500 MG/1
CAPSULE ORAL
COMMUNITY
Start: 2022-09-22 | End: 2023-01-05

## 2022-09-28 RX ADMIN — SODIUM CHLORIDE 250 ML: 9 INJECTION, SOLUTION INTRAVENOUS at 15:44

## 2022-09-28 RX ADMIN — FERRIC CARBOXYMALTOSE INJECTION 750 MG: 50 INJECTION, SOLUTION INTRAVENOUS at 15:47

## 2022-09-28 RX ADMIN — PROCHLORPERAZINE MALEATE 10 MG: 10 TABLET ORAL at 15:42

## 2022-09-28 NOTE — PROGRESS NOTES
Subjective Patient noting chronic back pain, unchanged performance status    REASON FOR CONSULTATION: Thrombocytosis  Provide an opinion on any further workup or treatment                             REQUESTING PHYSICIAN: Damian Sanchez MD    RECORDS OBTAINED:  Records of the patients history including those obtained from the referring provider were reviewed and summarized in detail.    HISTORY OF PRESENT ILLNESS:  The patient is a 76 y.o. year old male who is here for an opinion about the above issue.    History of Present Illness   The patient is a 76-year-old male followed with a number of issues including coronary artery disease, status post CABG, atrial flutter, previous CVA hyperlipidemia, GE reflux, carotid vascular disease, and hypertension.  He had been seen by vascular 2/25/2022 with mild progression of carotid disease but otherwise stable and also had follow-up with pain management for back pain 3/28/2022 requiring chronic narcotic therapy.  Patient has been resistant to epidural like procedures.  Unfortunately has had concurrent constipation requiring laxatives and additional opioid antagonist to reduce GI hypomotility.  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  Patient recently reviewed again by pain management with worsening pain.  Patient also saw GI periodically undergoing a follow-up MRI of the abdomen 8/9/2022 with scattered pancreatic cystic lesions unchanged from previous.  He had previously undergone EGD and colonoscopy 11/5/2021 with irregular Z-line and biopsies taken in nonbleeding internal hemorrhoids found during retroflexion that were small.  There are scattered small and large mouth diverticula found in the sigmoid colon descending colon and splenic flexure.    The patient now presents for thrombocytosis with review of his  "record over the last year demonstrating a platelet count that is escalated from 3-400,000 now to 8/11/2022 648,000 but concurrent microcytic and hypochromic indices.  These indices have been slowly reducing over the last 2 years approximately followed more closely.    These findings are discussed with the patient 8/16/2022 who indicates that he actually feels about the same though still has issues with back pain.  He is noted no change in bowel habit including, fortunately, improvement of his constipation without the use of additional medication such as Movantik.  He has not noted any change in the color of his stool including dark stools or any blood per rectum, urine though he does note periodic excess bruising from his anticoagulation therapy.    Past Medical History:   Diagnosis Date   • Anxiety    • Arthritis    • Atrial flutter (HCC)     Status post cavotricuspid isthmus ablation by Dr. Gustafson on 4/18/17   • Mandel esophagus    • Benign essential hypertension    • CAD (coronary artery disease)     3 vessel CABG 4/11/17 by Dr. Cortez: ROSARIO-prox LAD, SVG-OM1, SVG-OM3   • Carotid artery disease (HCC)     Status post carotid endarterectomy - USG 4/10/17: 50-59% INCHELLE, 1-15% LICA.    • Colonic polyp    • Cyst of pancreas    • DDD (degenerative disc disease), lumbosacral    • GERD (gastroesophageal reflux disease)    • H/O bone density study 2013   • H/O complete eye exam 2014   • History of kidney stone    • HLD (hyperlipidemia)    • Hypertension    • Kidney stone     8/22/22   • Lipid screening 05/31/2013   • Low back pain     [facility]   • Screening for prostate cancer 07/07/2015   • Skin cancer     nose   • Stroke (HCC)     RESIDUAL--\"BALANCE ISSUES\"        Past Surgical History:   Procedure Laterality Date   • CARDIAC CATHETERIZATION N/A 04/10/2017    Procedure: Left Heart Cath;  Surgeon: Marjorie Healy MD;  Location: [facility] CATH INVASIVE LOCATION;  Service:    • CARDIAC " CATHETERIZATION N/A 04/10/2017    Procedure: Coronary angiography;  Surgeon: Marjorie Healy MD;  Location: [facility] CATH INVASIVE LOCATION;  Service:    • CARDIAC CATHETERIZATION N/A 04/10/2017    Procedure: Left ventriculography;  Surgeon: Marjorie Healy MD;  Location: [facility] CATH INVASIVE LOCATION;  Service:    • CARDIAC CATHETERIZATION  2011   • CARDIAC ELECTROPHYSIOLOGY PROCEDURE N/A 04/18/2017    Procedure: Ablation atrial flutter;  Surgeon: Jose Antonio Gustafson MD;  Location: [facility] CATH INVASIVE LOCATION;  Service:    • CAROTID ENDARTERECTOMY     • CHOLECYSTECTOMY     • CHOLECYSTECTOMY WITH INTRAOPERATIVE CHOLANGIOGRAM N/A 09/07/2019    Procedure: Laparoscopic cholecystectomy with intraoperative cholangiogram;  Surgeon: Gauri Travsi MD;  Location: [facility] MAIN OR;  Service: General   • COLONOSCOPY  01/06/2015    Diverticulosis, one TA   • COLONOSCOPY N/A 02/14/2019    tics, NBIH, adenomatous polyp x 2   • COLONOSCOPY N/A 11/05/2021    Procedure: COLONOSCOPY TO CECUM AND TERM. ILEUM WITH COLD POLYPECTOMIES;  Surgeon: Everton Abel MD;  Location: [facility] ENDOSCOPY;  Service: Gastroenterology;  Laterality: N/A;  PRE OP - PERS H/O POLYPS  POST OP - COLON POLYPS,, DIVERTICULOSIS, HEMORRHOIDS   • CORONARY ARTERY BYPASS GRAFT N/A 04/11/2017    Procedure: AR STERNOTOMY CORONARY ARTERY BYPASS GRAFT TIMES 3 USING LEFT INTERNAL MAMMARY ARTERY AND LEFT GREATER SAPHENOUS VEIN GRAFT PER ENDOSCOPIC VEIN HARVESTING AND PRP ;  Surgeon: Temo Cortez MD;  Location: [facility] MAIN OR;  Service:    • ENDOSCOPY  01/06/2015    HH, Ervin's esophagus   • ENDOSCOPY N/A 02/14/2019    Z line irregular, HH, Ervin's esophagus   • ENDOSCOPY N/A 11/05/2021    Procedure: ESOPHAGOGASTRODUODENOSCOPY WITH BIOPSIES;  Surgeon: Everton Abel MD;  Location: [facility] ENDOSCOPY;  Service: Gastroenterology;  Laterality: N/A;  PRE OP - PERS H/O ERVIN'S  POST OP - IRREG Z LINE   • KNEE SURGERY Left    • URETEROSCOPY LASER LITHOTRIPSY WITH  STENT INSERTION Left 8/23/2022    Procedure: Cysto retrograde with left uretro stent placement;  Surgeon: Damien Oliveira MD;  Location: [facility];  Service: Urology;  Laterality: Left;   • VASECTOMY          Current Outpatient Medications on File Prior to Visit   Medication Sig Dispense Refill   • amLODIPine (NORVASC) 2.5 MG tablet TAKE ONE TABLET BY MOUTH DAILY 30 tablet 11   • apixaban (ELIQUIS) 5 MG tablet tablet Take 1 tablet by mouth 2 (Two) Times a Day. Lot # [id number]  Exp 10/23 28 tablet 0   • aspirin (aspirin) 81 MG EC tablet Take 1 tablet by mouth Daily. 30 tablet 0   • cephalexin (KEFLEX) 500 MG capsule      • ferrous gluconate (FERGON) 324 MG tablet Take 1 tablet by mouth Daily With Breakfast. 30 tablet 2   • HYDROcodone-acetaminophen (NORCO)  MG per tablet Take 1 tablet by mouth Every 6 (Six) Hours As Needed for Moderate Pain. Take at least 6 hours apart. Max 2/day. 30 day supply  DNF 9/7/2022 60 tablet 0   • mupirocin (BACTROBAN) 2 % cream Apply 1 application topically to the appropriate area as directed 2 (Two) Times a Day. 15 g 0   • Myrbetriq 25 MG tablet sustained-release 24 hour 24 hr tablet Take 25 mg by mouth Daily.     • nitroglycerin (NITROSTAT) 0.4 MG SL tablet Place 1 tablet under the tongue Every 5 (Five) Minutes As Needed for Chest Pain. Take no more than 3 doses in 15 minutes. 20 tablet 2   • pantoprazole (PROTONIX) 40 MG EC tablet Take 1 tablet by mouth Daily. 90 tablet 1   • ramipril (ALTACE) 5 MG capsule Take 1 capsule by mouth Daily. 90 capsule 1   • rosuvastatin (Crestor) 20 MG tablet Take 1 tablet by mouth Daily. 90 tablet 1     No current facility-administered medications on file prior to visit.        ALLERGIES:    Allergies   Allergen Reactions   • Atorvastatin Myalgia     Myalgia   • Penicillins Hives, Swelling and Rash        Social History     Socioeconomic History   • Marital status:      Spouse name: Brooke   • Years of education: 9th grade   Tobacco  "Use   • Smoking status: Former Smoker     Packs/day: 1.00     Types: Cigarettes     Start date: 1959     Quit date: 2009     Years since quittin.7   • Smokeless tobacco: Never Used   • Tobacco comment: CAFFEINE USE   Vaping Use   • Vaping Use: Never used   Substance and Sexual Activity   • Alcohol use: Yes     Comment: rarely   • Drug use: No   • Sexual activity: Yes     Partners: Female        Family History   Problem Relation Age of Onset   • Hypertension Mother    • Heart disease Mother    • Heart attack Mother    • Stroke Mother    • Heart disease Father    • Heart attack Father    • Stroke Father    • Hypertension Sister    • Heart attack Brother    • Heart disease Brother    • No Known Problems Brother    • Heart disease Brother    • Heart attack Brother    • Diabetes Brother    • No Known Problems Maternal Grandmother    • No Known Problems Maternal Grandfather    • No Known Problems Paternal Grandmother    • No Known Problems Paternal Grandfather    • Pancreatic cancer Paternal Cousin    • Arthritis Other    • Diabetes Other    • Hypertension Other    • Kidney disease Other         stones   • Malig Hyperthermia Neg Hx         Review of Systems   Constitutional: Negative for activity change, appetite change, fatigue and unexpected weight change.   HENT: Positive for dental problem (Edentulous, mouth irritation from dentures).    Eyes: Negative.    Respiratory: Negative.    Cardiovascular: Negative.    Gastrointestinal: Negative.    Endocrine: Negative.    Genitourinary: Negative.    Musculoskeletal: Positive for arthralgias and back pain.   Neurological: Positive for dizziness (Unstable gait and ambulation after previous CVA) and weakness.        Objective     Vitals:    22 1444   BP: 138/69   Pulse: 51   Resp: 16   Temp: 98 °F (36.7 °C)   TempSrc: Temporal   SpO2: 98%   Weight: 76.3 kg (168 lb 3.2 oz)   Height: 172.7 cm (67.99\")   PainSc: 0-No pain     Current Status 2022   ECOG score " 0       Physical Exam  Constitutional:       Appearance: Normal appearance.   HENT:      Head: Normocephalic and atraumatic.      Nose: Nose normal.      Mouth/Throat:      Mouth: Mucous membranes are moist.      Pharynx: Oropharynx is clear.      Comments: Edentulous, generalized gum irritation noted  Eyes:      Conjunctiva/sclera: Conjunctivae normal.      Pupils: Pupils are equal, round, and reactive to light.   Cardiovascular:      Rate and Rhythm: Normal rate and regular rhythm.      Pulses: Normal pulses.      Heart sounds: Normal heart sounds.   Pulmonary:      Effort: Pulmonary effort is normal.      Breath sounds: Normal breath sounds.   Abdominal:      General: Bowel sounds are normal.      Palpations: Abdomen is soft.   Musculoskeletal:         General: Normal range of motion.   Skin:     General: Skin is dry.   Neurological:      General: No focal deficit present.      Mental Status: He is alert.      Motor: Weakness present.      Coordination: Coordination abnormal.      Gait: Gait abnormal.   Psychiatric:         Mood and Affect: Mood normal.          RECENT LABS:  Hematology WBC   Date Value Ref Range Status   09/21/2022 11.49 (H) 3.40 - 10.80 10*3/mm3 Final   08/11/2022 10.6 3.4 - 10.8 x10E3/uL Final     RBC   Date Value Ref Range Status   09/21/2022 5.92 (H) 4.14 - 5.80 10*6/mm3 Final   08/11/2022 6.62 (H) 4.14 - 5.80 x10E6/uL Final     Hemoglobin   Date Value Ref Range Status   09/21/2022 12.9 (L) 13.0 - 17.7 g/dL Final     Hematocrit   Date Value Ref Range Status   09/21/2022 43.2 37.5 - 51.0 % Final     Platelets   Date Value Ref Range Status   09/21/2022 588 (H) 140 - 450 10*3/mm3 Final          Assessment & Plan      76-year-old male with medical issues including coronary artery disease, status post CABG, atrial flutter status post ablation, previous CVA, hyperlipidemia, GE reflux, cardiovascular disease and hypertension.  He also has chronic back pain followed by pain management requiring  chronic narcotic therapy.  His medical issues per tickly cardiovascular and CVA led to eventual placement on aspirin and Eliquis chronically.  He has additionally been seen by GI including review of scattered cystic lesions in the pancreas and EGD colonoscopy as recently as November 2021.      The patient now presents for thrombocytosis with review of his record over the last year demonstrating a platelet count that is escalated from 3-400,000 now to 8/11/2022 648,000 but concurrent microcytic and hypochromic indices.  These indices have been slowly reducing over the last 2 years approximately followed more closely.  Concurrently is a mild drop in his baseline hemoglobin still well within normal limits.    These issues are discussed with the patient in some detail 8/16/2022 with this thrombocytosis thought to be related to iron deficiency.  This issupported during his visit with ferritin found to be 16.3 and iron of 26 with 5% saturation and TIBC 511.  We should first moved to replace his iron stores with his platelet count likely to reflect this reconstitution.  If he has continued evidence of iron deficiency despite this or is intolerant to oral iron (noting his history with constipation) then we would need to move to intravenous iron therapy and possibly further GI assessment with capsule endoscopy.    Plan:  *Ferrous gluconate (hopefully better tolerated) E scribed to pharmacy at 1 to 2 tablets daily as tolerated    *Reassessment 5 weeks for iron status and repeat CBC including platelet count    *6 weeks MD, possible IV iron- Injectafer if needed    *Patient agreeable this plan and follow-up    I spent 65 minutes caring for Madhu on this date of service. This time includes time spent by me in the following activities: preparing for the visit, reviewing tests, obtaining and/or reviewing a separately obtained history, performing a medically appropriate examination and/or evaluation, counseling and educating the  patient/family/caregiver, ordering medications, tests, or procedures, referring and communicating with other health care professionals, documenting information in the medical record, independently interpreting results and communicating that information with the patient/family/caregiver and care coordination.

## 2022-09-28 NOTE — PROGRESS NOTES
Subjective Patient noting chronic back pain, unchanged performance status, recent nephrolithiasis    REASON FOR FOLLOW-UP: Thrombocytosis secondary to iron deficiency anemia      History of present illness  The patient is a 76-year-old male followed with a number of issues including coronary artery disease, status post CABG, atrial flutter, previous CVA hyperlipidemia, GE reflux, carotid vascular disease, and hypertension.  He had been seen by vascular 2/25/2022 with mild progression of carotid disease but otherwise stable and also had follow-up with pain management for back pain 3/28/2022 requiring chronic narcotic therapy.  Patient has been resistant to epidural like procedures.  Unfortunately has had concurrent constipation requiring laxatives and additional opioid antagonist to reduce GI hypomotility.  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  Patient recently reviewed again by pain management with worsening pain.  Patient also saw GI periodically undergoing a follow-up MRI of the abdomen 8/9/2022 with scattered pancreatic cystic lesions unchanged from previous.  He had previously undergone EGD and colonoscopy 11/5/2021 with irregular Z-line and biopsies taken in nonbleeding internal hemorrhoids found during retroflexion that were small.  There are scattered small and large mouth diverticula found in the sigmoid colon descending colon and splenic flexure.    The patient now presents for thrombocytosis with review of his record over the last year demonstrating a platelet count that is escalated from 3-400,000 now to 8/11/2022 648,000 but concurrent microcytic and hypochromic indices.  These indices have been slowly reducing over the last 2 years approximately followed more closely.    These findings are discussed with the patient 8/16/2022 who  "indicates that he actually feels about the same though still has issues with back pain.  He is noted no change in bowel habit including, fortunately, improvement of his constipation without the use of additional medication such as Movantik.  He has not noted any change in the color of his stool including dark stools or any blood per rectum, urine though he does note periodic excess bruising from his anticoagulation therapy.    The patient moved to a trial of iron which, unfortunately, worsened his constipation in which he had discontinue. He had further issues in early September with left renal stone, requiring cystoscopy, stent placement 8/23/2022.    He is next seen back 9/28/2022 with repeat studies performed 9/21 demonstrating 5% iron saturation, ferritin of 12.5.  He remains further weight and fatigue, again oral iron intolerant and will require IV iron preparations for his iron deficiency anemia.    Past Medical History:   Diagnosis Date   • Anxiety    • Arthritis    • Atrial flutter (HCC)     Status post cavotricuspid isthmus ablation by Dr. Gustafson on 4/18/17   • Mandel esophagus    • Benign essential hypertension    • CAD (coronary artery disease)     3 vessel CABG 4/11/17 by Dr. Cortez: ROSARIO-prox LAD, SVG-OM1, SVG-OM3   • Carotid artery disease (HCC)     Status post carotid endarterectomy - [facility] 4/10/17: 50-59% NICHELLE, 1-15% LICA.    • Colonic polyp    • Cyst of pancreas    • DDD (degenerative disc disease), lumbosacral    • GERD (gastroesophageal reflux disease)    • H/O bone density study 2013   • H/O complete eye exam 2014   • History of kidney stone    • HLD (hyperlipidemia)    • Hypertension    • Kidney stone     8/22/22   • Lipid screening 05/31/2013   • Low back pain     physical therapy [facility] rehab 5-12-10   • Screening for prostate cancer 07/07/2015   • Skin cancer     nose   • Stroke (HCC)     RESIDUAL--\"BALANCE ISSUES\"        Past Surgical History:   Procedure Laterality Date   • CARDIAC " CATHETERIZATION N/A 04/10/2017    Procedure: Left Heart Cath;  Surgeon: Marjorie Healy MD;  Location: [facility] CATH INVASIVE LOCATION;  Service:    • CARDIAC CATHETERIZATION N/A 04/10/2017    Procedure: Coronary angiography;  Surgeon: Marjorie Healy MD;  [facility];  Service:    • CARDIAC CATHETERIZATION N/A 04/10/2017    Procedure: Left ventriculography;  Surgeon: Marjorie Healy MD;  Location: [facility] CATH INVASIVE LOCATION;  Service:    • CARDIAC CATHETERIZATION  2011   • CARDIAC ELECTROPHYSIOLOGY PROCEDURE N/A 04/18/2017    Procedure: Ablation atrial flutter;  Surgeon: Jose Antonio Gustafson MD;  Location: [facility] CATH INVASIVE LOCATION;  Service:    • CAROTID ENDARTERECTOMY     • CHOLECYSTECTOMY     • CHOLECYSTECTOMY WITH INTRAOPERATIVE CHOLANGIOGRAM N/A 09/07/2019    Procedure: Laparoscopic cholecystectomy with intraoperative cholangiogram;  Surgeon: Gauri Travis MD;  Location: [facility] MAIN OR;  Service: General   • COLONOSCOPY  01/06/2015    Diverticulosis, one TA   • COLONOSCOPY N/A 02/14/2019    tics, NBIH, adenomatous polyp x 2   • COLONOSCOPY N/A 11/05/2021    Procedure: COLONOSCOPY TO CECUM AND TERM. ILEUM WITH COLD POLYPECTOMIES;  Surgeon: Everton Abel MD;  Location: [facility] ENDOSCOPY;  Service: Gastroenterology;  Laterality: N/A;  PRE OP - PERS H/O POLYPS  POST OP - COLON POLYPS,, DIVERTICULOSIS, HEMORRHOIDS   • CORONARY ARTERY BYPASS GRAFT N/A 04/11/2017    Procedure: AR STERNOTOMY CORONARY ARTERY BYPASS GRAFT TIMES 3 USING LEFT INTERNAL MAMMARY ARTERY AND LEFT GREATER SAPHENOUS VEIN GRAFT PER ENDOSCOPIC VEIN HARVESTING AND PRP ;  Surgeon: Temo Cortez MD;  Location: [facility] OR;  Service:    • ENDOSCOPY  01/06/2015    HH, Mandel's esophagus   • ENDOSCOPY N/A 02/14/2019    Z line irregular, HH, Mandel's esophagus   • ENDOSCOPY N/A 11/05/2021    Procedure: ESOPHAGOGASTRODUODENOSCOPY WITH BIOPSIES;  Surgeon: Everton Abel MD;  Location: [facility] ENDOSCOPY;   Service: Gastroenterology;  Laterality: N/A;  PRE OP - PERS H/O ERVIN'S  POST OP - IRREG Z LINE   • KNEE SURGERY Left    • URETEROSCOPY LASER LITHOTRIPSY WITH STENT INSERTION Left 8/23/2022    Procedure: Cysto retrograde with left uretro stent placement;  Surgeon: Damien Oliveira MD;  Location: [facility] MAIN OR;  Service: Urology;  Laterality: Left;   • VASECTOMY          Current Outpatient Medications on File Prior to Visit   Medication Sig Dispense Refill   • amLODIPine (NORVASC) 2.5 MG tablet TAKE ONE TABLET BY MOUTH DAILY 30 tablet 11   • apixaban (ELIQUIS) 5 MG tablet tablet Take 1 tablet by mouth 2 (Two) Times a Day. Lot # [id number]  Exp 10/23 28 tablet 0   • aspirin (aspirin) 81 MG EC tablet Take 1 tablet by mouth Daily. 30 tablet 0   • cephalexin (KEFLEX) 500 MG capsule      • ferrous gluconate (FERGON) 324 MG tablet Take 1 tablet by mouth Daily With Breakfast. 30 tablet 2   • HYDROcodone-acetaminophen (NORCO)  MG per tablet Take 1 tablet by mouth Every 6 (Six) Hours As Needed for Moderate Pain. Take at least 6 hours apart. Max 2/day. 30 day supply  DNF 9/7/2022 60 tablet 0   • mupirocin (BACTROBAN) 2 % cream Apply 1 application topically to the appropriate area as directed 2 (Two) Times a Day. 15 g 0   • Myrbetriq 25 MG tablet sustained-release 24 hour 24 hr tablet Take 25 mg by mouth Daily.     • nitroglycerin (NITROSTAT) 0.4 MG SL tablet Place 1 tablet under the tongue Every 5 (Five) Minutes As Needed for Chest Pain. Take no more than 3 doses in 15 minutes. 20 tablet 2   • pantoprazole (PROTONIX) 40 MG EC tablet Take 1 tablet by mouth Daily. 90 tablet 1   • ramipril (ALTACE) 5 MG capsule Take 1 capsule by mouth Daily. 90 capsule 1   • rosuvastatin (Crestor) 20 MG tablet Take 1 tablet by mouth Daily. 90 tablet 1     No current facility-administered medications on file prior to visit.        ALLERGIES:    Allergies   Allergen Reactions   • Atorvastatin Myalgia     Myalgia   • Penicillins Hives,  Swelling and Rash        Social History     Socioeconomic History   • Marital status:      Spouse name: Brooke   • Years of education: 9th grade   Tobacco Use   • Smoking status: Former Smoker     Packs/day: 1.00     Types: Cigarettes     Start date: 1959     Quit date: 2009     Years since quittin.7   • Smokeless tobacco: Never Used   • Tobacco comment: CAFFEINE USE   Vaping Use   • Vaping Use: Never used   Substance and Sexual Activity   • Alcohol use: Yes     Comment: rarely   • Drug use: No   • Sexual activity: Yes     Partners: Female        Family History   Problem Relation Age of Onset   • Hypertension Mother    • Heart disease Mother    • Heart attack Mother    • Stroke Mother    • Heart disease Father    • Heart attack Father    • Stroke Father    • Hypertension Sister    • Heart attack Brother    • Heart disease Brother    • No Known Problems Brother    • Heart disease Brother    • Heart attack Brother    • Diabetes Brother    • No Known Problems Maternal Grandmother    • No Known Problems Maternal Grandfather    • No Known Problems Paternal Grandmother    • No Known Problems Paternal Grandfather    • Pancreatic cancer Paternal Cousin    • Arthritis Other    • Diabetes Other    • Hypertension Other    • Kidney disease Other         stones   • Malig Hyperthermia Neg Hx         Review of Systems   Constitutional: Positive for fatigue. Negative for activity change, appetite change and unexpected weight change.   HENT: Positive for dental problem (Edentulous, mouth irritation from dentures).    Eyes: Negative.    Respiratory: Negative.    Cardiovascular: Negative.    Gastrointestinal: Negative.    Endocrine: Negative.    Genitourinary: Positive for flank pain.   Musculoskeletal: Positive for arthralgias and back pain.   Skin: Positive for pallor.   Neurological: Positive for dizziness (Unstable gait and ambulation after previous CVA) and weakness.        Objective     Vitals:    22  "1444   BP: 138/69   Pulse: 51   Resp: 16   Temp: 98 °F (36.7 °C)   TempSrc: Temporal   SpO2: 98%   Weight: 76.3 kg (168 lb 3.2 oz)   Height: 172.7 cm (67.99\")   PainSc: 0-No pain     Current Status 9/28/2022   ECOG score 0       Physical Exam  Constitutional:       Appearance: Normal appearance.   HENT:      Head: Normocephalic and atraumatic.      Nose: Nose normal.      Mouth/Throat:      Mouth: Mucous membranes are moist.      Pharynx: Oropharynx is clear.      Comments: Edentulous, generalized gum irritation noted  Eyes:      Conjunctiva/sclera: Conjunctivae normal.      Pupils: Pupils are equal, round, and reactive to light.   Cardiovascular:      Rate and Rhythm: Normal rate and regular rhythm.      Pulses: Normal pulses.      Heart sounds: Normal heart sounds.   Pulmonary:      Effort: Pulmonary effort is normal.      Breath sounds: Normal breath sounds.   Abdominal:      General: Bowel sounds are normal.      Palpations: Abdomen is soft.   Musculoskeletal:         General: Normal range of motion.   Skin:     General: Skin is dry.   Neurological:      General: No focal deficit present.      Mental Status: He is alert.      Motor: Weakness present.      Coordination: Coordination abnormal.      Gait: Gait abnormal.   Psychiatric:         Mood and Affect: Mood normal.          RECENT LABS:  Hematology WBC   Date Value Ref Range Status   09/21/2022 11.49 (H) 3.40 - 10.80 10*3/mm3 Final   08/11/2022 10.6 3.4 - 10.8 x10E3/uL Final     RBC   Date Value Ref Range Status   09/21/2022 5.92 (H) 4.14 - 5.80 10*6/mm3 Final   08/11/2022 6.62 (H) 4.14 - 5.80 x10E6/uL Final     Hemoglobin   Date Value Ref Range Status   09/21/2022 12.9 (L) 13.0 - 17.7 g/dL Final     Hematocrit   Date Value Ref Range Status   09/21/2022 43.2 37.5 - 51.0 % Final     Platelets   Date Value Ref Range Status   09/21/2022 588 (H) 140 - 450 10*3/mm3 Final          Assessment & Plan      76-year-old male with medical issues including coronary " artery disease, status post CABG, atrial flutter status post ablation, previous CVA, hyperlipidemia, GE reflux, cardiovascular disease and hypertension.  He also has chronic back pain followed by pain management requiring chronic narcotic therapy.  His medical issues per tickly cardiovascular and CVA led to eventual placement on aspirin and Eliquis chronically.  He has additionally been seen by GI including review of scattered cystic lesions in the pancreas and EGD colonoscopy as recently as November 2021.      The patient now presents for thrombocytosis with review of his record over the last year demonstrating a platelet count that is escalated from 3-400,000 now to 8/11/2022 648,000 but concurrent microcytic and hypochromic indices.  These indices have been slowly reducing over the last 2 years approximately followed more closely.  Concurrently is a mild drop in his baseline hemoglobin still well within normal limits.    These issues are discussed with the patient in some detail 8/16/2022 with this thrombocytosis thought to be related to iron deficiency.  This issupported during his visit with ferritin found to be 16.3 and iron of 26 with 5% saturation and TIBC 511.  We should first moved to replace his iron stores with his platelet count likely to reflect this reconstitution.  If he has continued evidence of iron deficiency despite this or is intolerant to oral iron (noting his history with constipation) then we would need to move to intravenous iron therapy and possibly further GI assessment with capsule endoscopy.  The patient was placed on ferrous gluconate which, unfortunately, he was unable to tolerate with worsening constipation.      He had further issues in early September with left renal stone, requiring cystoscopy, stent placement 8/23/2022.    He is next seen back 9/28/2022 with repeat studies performed 9/21 demonstrating 5% iron saturation, ferritin of 12.5.  He remains further weight and fatigue,  again oral iron intolerant and will require IV iron preparations for his iron deficiency anemia.      Plan:     *Discontinue oral iron altogether    *Proceed with Injectafer 750 mg IV today x1, repeat 1 week at 750 mg IV to replete iron stores for deficiency anemia.    *Reassessment 15 weeks-CBC, ferritin, TIBC    *16 weeks MD, possible Injectafer

## 2022-10-03 ENCOUNTER — TELEPHONE (OUTPATIENT)
Dept: PAIN MEDICINE | Facility: CLINIC | Age: 77
End: 2022-10-03

## 2022-10-03 DIAGNOSIS — G89.29 CHRONIC PAIN OF BOTH HIPS: ICD-10-CM

## 2022-10-03 DIAGNOSIS — M54.50 CHRONIC BILATERAL LOW BACK PAIN WITHOUT SCIATICA: ICD-10-CM

## 2022-10-03 DIAGNOSIS — M25.551 CHRONIC PAIN OF BOTH HIPS: ICD-10-CM

## 2022-10-03 DIAGNOSIS — G89.29 CHRONIC BILATERAL LOW BACK PAIN WITHOUT SCIATICA: ICD-10-CM

## 2022-10-03 DIAGNOSIS — M25.552 CHRONIC PAIN OF BOTH HIPS: ICD-10-CM

## 2022-10-03 RX ORDER — HYDROCODONE BITARTRATE AND ACETAMINOPHEN 10; 325 MG/1; MG/1
1 TABLET ORAL EVERY 6 HOURS PRN
Qty: 8 TABLET | Refills: 0 | Status: SHIPPED | OUTPATIENT
Start: 2022-10-03 | End: 2022-10-07 | Stop reason: SDUPTHER

## 2022-10-03 NOTE — TELEPHONE ENCOUNTER
Provider: ANITRA EWING     Caller: PATIENT    Relationship to Patient: SELF    Pharmacy: [facility]-- [phone]    Phone Number: [phone]    Reason for Call: PT. HAS APPT. ON Friday WITH ANITRA.   STATES THAT HE JUST GOT OUT OF HOSPITAL WITH KIDNEY STONES AND HAD LITHOTRIPSY.   HE STATES THAT THE DRAlondra INCREASED HIS PAIN MEDS.   WAS TOLD TO CALL PAIN DOCTOR TO SEE IF HE CAN GET ENOUGH SENT TO PHARMACY UNTIL Friday APPT.  HYDROCODONE  MG.   HE RAN OUT OF MEDS YESTERDAY.   PLEASE CALL PT. TO ADVISE.

## 2022-10-03 NOTE — TELEPHONE ENCOUNTER
Medication Refill Request    Date of phone call: 10/03/2022    Medication being requested: Norco  mg  sig: Take 1 tablet by mouth Every 6 (Six) Hours As Needed for Moderate Pain.   Qty: 60    Date of last visit: 8/08/2022    Date of last refill:     RUTH up to date?:     Next Follow up?: 10/07/2022    Any new pertinent information? (i.e, new medication allergies, new use of medications, change in patient's health or condition, non-compliance or inconsistency with prescribing agreement?):

## 2022-10-03 NOTE — TELEPHONE ENCOUNTER
Appropriate early refill. Via guidance from our office he increased his Norco 10/325 to TID temporarily due to kidney stone pain.

## 2022-10-05 ENCOUNTER — INFUSION (OUTPATIENT)
Dept: ONCOLOGY | Facility: HOSPITAL | Age: 77
End: 2022-10-05

## 2022-10-05 VITALS
DIASTOLIC BLOOD PRESSURE: 71 MMHG | BODY MASS INDEX: 25.4 KG/M2 | RESPIRATION RATE: 18 BRPM | OXYGEN SATURATION: 98 % | WEIGHT: 167 LBS | HEART RATE: 55 BPM | SYSTOLIC BLOOD PRESSURE: 163 MMHG | TEMPERATURE: 98 F

## 2022-10-05 DIAGNOSIS — E61.1 IRON DEFICIENCY: ICD-10-CM

## 2022-10-05 DIAGNOSIS — D50.9 IRON DEFICIENCY ANEMIA, UNSPECIFIED IRON DEFICIENCY ANEMIA TYPE: Primary | ICD-10-CM

## 2022-10-05 PROCEDURE — 25010000002 FERRIC CARBOXYMALTOSE 750 MG/15ML SOLUTION 15 ML VIAL: Performed by: INTERNAL MEDICINE

## 2022-10-05 PROCEDURE — 96374 THER/PROPH/DIAG INJ IV PUSH: CPT

## 2022-10-05 PROCEDURE — 63710000001 PROCHLORPERAZINE MALEATE PER 10 MG: Performed by: INTERNAL MEDICINE

## 2022-10-05 PROCEDURE — 96365 THER/PROPH/DIAG IV INF INIT: CPT

## 2022-10-05 RX ORDER — PROCHLORPERAZINE MALEATE 10 MG
10 TABLET ORAL ONCE
Status: COMPLETED | OUTPATIENT
Start: 2022-10-05 | End: 2022-10-05

## 2022-10-05 RX ORDER — SODIUM CHLORIDE 9 MG/ML
250 INJECTION, SOLUTION INTRAVENOUS ONCE
Status: COMPLETED | OUTPATIENT
Start: 2022-10-05 | End: 2022-10-05

## 2022-10-05 RX ADMIN — PROCHLORPERAZINE MALEATE 10 MG: 10 TABLET ORAL at 13:22

## 2022-10-05 RX ADMIN — SODIUM CHLORIDE 250 ML: 9 INJECTION, SOLUTION INTRAVENOUS at 13:33

## 2022-10-05 RX ADMIN — FERRIC CARBOXYMALTOSE INJECTION 750 MG: 50 INJECTION, SOLUTION INTRAVENOUS at 13:33

## 2022-10-07 ENCOUNTER — OFFICE VISIT (OUTPATIENT)
Dept: PAIN MEDICINE | Facility: CLINIC | Age: 77
End: 2022-10-07

## 2022-10-07 VITALS
RESPIRATION RATE: 12 BRPM | HEART RATE: 50 BPM | OXYGEN SATURATION: 99 % | WEIGHT: 167.6 LBS | DIASTOLIC BLOOD PRESSURE: 65 MMHG | BODY MASS INDEX: 25.4 KG/M2 | TEMPERATURE: 97.8 F | HEIGHT: 68 IN | SYSTOLIC BLOOD PRESSURE: 154 MMHG

## 2022-10-07 DIAGNOSIS — G89.29 CHRONIC BILATERAL LOW BACK PAIN WITHOUT SCIATICA: Primary | ICD-10-CM

## 2022-10-07 DIAGNOSIS — M25.552 CHRONIC PAIN OF BOTH HIPS: ICD-10-CM

## 2022-10-07 DIAGNOSIS — M25.551 CHRONIC PAIN OF BOTH HIPS: ICD-10-CM

## 2022-10-07 DIAGNOSIS — Z79.899 ENCOUNTER FOR LONG-TERM (CURRENT) USE OF HIGH-RISK MEDICATION: ICD-10-CM

## 2022-10-07 DIAGNOSIS — M25.50 ARTHRALGIA OF MULTIPLE JOINTS: ICD-10-CM

## 2022-10-07 DIAGNOSIS — M54.50 CHRONIC BILATERAL LOW BACK PAIN WITHOUT SCIATICA: Primary | ICD-10-CM

## 2022-10-07 DIAGNOSIS — G89.29 CHRONIC PAIN OF BOTH HIPS: ICD-10-CM

## 2022-10-07 PROCEDURE — 99215 OFFICE O/P EST HI 40 MIN: CPT

## 2022-10-07 RX ORDER — HYDROCODONE BITARTRATE AND ACETAMINOPHEN 10; 325 MG/1; MG/1
1 TABLET ORAL EVERY 6 HOURS PRN
Qty: 60 TABLET | Refills: 0 | Status: SHIPPED | OUTPATIENT
Start: 2022-10-07 | End: 2022-11-01 | Stop reason: SDUPTHER

## 2022-10-07 NOTE — PROGRESS NOTES
"CHIEF COMPLAINT  Back and joint pain    Subjective   Madhu Santoro is a 76 y.o. male  who presents for follow-up.  He has a history of back and joint pain. He reports that his pain has been slightly increased due to having kidney stones with surgery for stent placement on 9/22/22.    Today pain is 8/10VAS in severity. Pain is located in his lumbar spine and runs across his belt line. He reports occasional radiating pain to bilateral legs (\"entire leg\"). Describes this pain as a nearly continuous aching and throbbing. Pain is worsened by prolonged walking or standing, increased physical activity, and bending/twisitng. Pain improves with rest/reposition, heat, and medication. He reports that he has tried PT in the past but this only caused increased pain.    Continues with Hydrocodone 10mg 2/day and OTC Tylenol PRN. Reports occasional constipation that is managed by Miralax and laxatives. Denies any other side effects from the regimen including somnolence. The regimen helps \"take the edge off\". Notes improvement in activity and function with regimen. ADL's by self. Denies any bowel or bladder changes.     Patient was hospitalized in August due to kidney stones and underwent a cystoscopy and left ureteral stent placement. On 9/22/22, patient presented to Dr. Oliveira with complaints of blood in his urine and back/abdominal pain. At this time a lithotripsy was performed.  Patient spoke with our office on 9/23/22 about increased pain and whether or not to take medication that was prescribed by Dr. Oliveira following procedure. YM Nguyen agreed to increase Hydrocodone temporarily to 3 times per day x 1 week. Patient verbalized understanding. Prescription for 8 extra Hydrocodone tablets was sent in to cover this increase.         Not a candidate for NSAIDs - remains on Eliquis    Patient not interested in procedures. He reports a friend had back injections before and had a poor outcome.    Back Pain  This is a " chronic problem. The current episode started more than 1 year ago. The problem occurs constantly. The problem has been waxing and waning since onset. The pain is present in the lumbar spine. The quality of the pain is described as aching (throbbing). Radiates to: to bilateral legs L > R  The pain is at a severity of 8/10. The symptoms are aggravated by standing, sitting, twisting, bending, lying down and position (walking long distances, ). Stiffness is present all day. Associated symptoms include numbness (hands,feet) and weakness (hands). Pertinent negatives include no abdominal pain, chest pain, dysuria, fever or headaches. He has tried heat and analgesics (rest/reposition, PT in the past (this caused increased pain)) for the symptoms. The treatment provided mild relief.   Joint Pain  This is a chronic (bilateral hands, left shoulder, bilateral knees) problem. The current episode started more than 1 year ago. The problem occurs daily. The problem has been waxing and waning. Associated symptoms include arthralgias, numbness (hands,feet) and weakness (hands). Pertinent negatives include no abdominal pain, chest pain, congestion, coughing, fatigue, fever or headaches. The symptoms are aggravated by walking, standing and bending (weather changes, increased physical activity, ). He has tried oral narcotics, position changes, rest, heat, lying down and acetaminophen for the symptoms. The treatment provided moderate relief.      PEG Assessment   What number best describes your pain on average in the past week?8  What number best describes how, during the past week, pain has interfered with your enjoyment of life?8  What number best describes how, during the past week, pain has interfered with your general activity?  8    Review of Pertinent Medical Data ---  Reviewed H&P note from Dr. Damien Oliveira from 9/22/2022.  Patient presented with complaint of a left ureteral stone.  He had previously underwent a cystoscopy  "and left ureteral stent placement performed by Dr. Oliveira during hospital stay from 8/22/22 - 8/24/22.  At follow up appointment on 9/22/22, patient reported that he has seen occasional blood in his urine and complains of discomfort in his left lower quadrant.  Plan at this appointment was left ureteroscopy xrays, laser stone destruction and left stent placement.  Patient was aware of risk of surgery and wished to proceed.    The following portions of the patient's history were reviewed and updated as appropriate: allergies, current medications, past family history, past medical history, past social history, past surgical history and problem list.    Review of Systems   Constitutional: Negative for activity change, fatigue and fever.   HENT: Negative for congestion.    Eyes: Negative for visual disturbance.   Respiratory: Negative for cough and chest tightness.    Cardiovascular: Negative for chest pain.   Gastrointestinal: Negative for abdominal pain, constipation and diarrhea.   Genitourinary: Negative for difficulty urinating and dysuria.   Musculoskeletal: Positive for arthralgias and back pain.   Neurological: Positive for weakness (hands) and numbness (hands,feet). Negative for dizziness, light-headedness and headaches.   Psychiatric/Behavioral: Negative for agitation, sleep disturbance and suicidal ideas. The patient is not nervous/anxious.      I have reviewed and confirmed the accuracy of the ROS as documented by the MA/LPN/RN MY Perez    Vitals:    10/07/22 1100   BP: 154/65   BP Location: Left arm   Patient Position: Sitting   Pulse: 50   Resp: 12   Temp: 97.8 °F (36.6 °C)   SpO2: 99%   Weight: 76 kg (167 lb 9.6 oz)   Height: 172.7 cm (67.99\")   PainSc:   8   PainLoc: Back  Comment:  joint     Objective   Physical Exam  Constitutional:       Appearance: Normal appearance.   HENT:      Head: Normocephalic.   Cardiovascular:      Rate and Rhythm: Normal rate and regular rhythm.   Pulmonary:    "   Effort: Pulmonary effort is normal.      Breath sounds: Normal breath sounds.   Musculoskeletal:      Cervical back: Normal range of motion.      Lumbar back: Tenderness present. Decreased range of motion. Positive right straight leg raise test and positive left straight leg raise test.      Comments: Diffuse arthritic changes   Skin:     General: Skin is warm and dry.      Capillary Refill: Capillary refill takes less than 2 seconds.   Neurological:      General: No focal deficit present.      Mental Status: He is alert and oriented to person, place, and time.   Psychiatric:         Mood and Affect: Mood normal.         Behavior: Behavior normal.         Thought Content: Thought content normal.         Cognition and Memory: Cognition normal.       Assessment & Plan   Diagnoses and all orders for this visit:    1. Chronic bilateral low back pain without sciatica (Primary)  -     HYDROcodone-acetaminophen (NORCO)  MG per tablet; Take 1 tablet by mouth Every 6 (Six) Hours As Needed for Severe Pain. Take 1 tablet by mouth every 6 hours. Take at least 6 hours apart. Max 2/day. 30 day supply  Dispense: 60 tablet; Refill: 0    2. Chronic pain of both hips  -     HYDROcodone-acetaminophen (NORCO)  MG per tablet; Take 1 tablet by mouth Every 6 (Six) Hours As Needed for Severe Pain. Take 1 tablet by mouth every 6 hours. Take at least 6 hours apart. Max 2/day. 30 day supply  Dispense: 60 tablet; Refill: 0    3. Arthralgia of multiple joints    4. Encounter for long-term (current) use of high-risk medication    --- The urine drug screen confirmation from 8/8/22 has been reviewed and the result is appropriate based on patient history and RUTH report  --- The patient signed an updated copy of the controlled substance agreement on 6/6/22  --- Refill Hydrocodone. Patient appears stable with current regimen. No adverse effects. Regarding continuation of opioids, there is no evidence of aberrant behavior or any red  flags. The patient continues with appropriate response to opioid therapy. ADL's remain intact by self.   --- Follow-up 2 months      I spent greater than 40 minutes caring for Madhu on this date of service. This time includes time spent by me in the following activities: preparing for the visit, reviewing tests, obtaining and/or reviewing a separately obtained history, performing a medically appropriate examination and/or evaluation, counseling and educating the patient/family/caregiver, referring and communicating with other health care professionals, documenting information in the medical record, independently interpreting results and communicating that information with the patient/family/caregiver and care coordination    RUTH REPORT  As part of the patient's treatment plan, I am prescribing controlled substances. The patient has been made aware of appropriate use of such medications, including potential risk of somnolence, limited ability to drive and/or work safely, and the potential for dependence or overdose. It has also been made clear that these medications are for use by this patient only, without concomitant use of alcohol or other substances unless prescribed.     Patient has completed prescribing agreement detailing terms of continued prescribing of controlled substances, including monitoring RUTH reports, urine drug screening, and pill counts if necessary. The patient is aware that inappropriate use will results in cessation of prescribing such medications.    As the clinician, I personally reviewed the RUTH from 10/7/22 while the patient was in the office today.    History and physical exam exhibit continued safe and appropriate use of controlled substances.    Dictated utilizing Dragon dictation.     This document is intended for medical expert use only. Reading of this document by patients and/or patient's family without participating medical staff guidance may result in misinterpretation and  unintended morbidity.   Any interpretation of such data is the responsibility of the patient and/or family member responsible for the patient in concert with their primary or specialist providers, not to be left for sources of online searches such as [employer], [employer] or similar queries. Relying on these approaches to knowledge may result in misinterpretation, misguided goals of care and even death should patients or family members try recommendations outside of the realm of professional medical care in a supervised way.    Patient remained masked during entire encounter. No cough present. I donned a mask and eye protection throughout entire visit. Prior to donning mask and eye protection, hand hygiene was performed, as well as when it was doffed.  I was closer than 6 feet, but not for an extended period of time. No obvious exposure to any bodily fluids.

## 2022-10-13 ENCOUNTER — HOSPITAL ENCOUNTER (EMERGENCY)
Facility: HOSPITAL | Age: 77
Discharge: HOME | End: 2022-10-13
Attending: EMERGENCY MEDICINE | Admitting: EMERGENCY MEDICINE

## 2022-10-13 VITALS
RESPIRATION RATE: 18 BRPM | TEMPERATURE: 97.7 F | BODY MASS INDEX: 26.52 KG/M2 | SYSTOLIC BLOOD PRESSURE: 153 MMHG | WEIGHT: 175 LBS | HEIGHT: 68 IN | DIASTOLIC BLOOD PRESSURE: 68 MMHG | OXYGEN SATURATION: 98 % | HEART RATE: 61 BPM

## 2022-10-13 DIAGNOSIS — T14.8XXA BLEEDING FROM WOUND: Primary | ICD-10-CM

## 2022-10-13 DIAGNOSIS — Z79.01 CHRONIC ANTICOAGULATION: ICD-10-CM

## 2022-10-13 PROCEDURE — 99283 EMERGENCY DEPT VISIT LOW MDM: CPT

## 2022-10-13 RX ADMIN — LIDOCAINE HYDROCHLORIDE 1 ML: 10; .005 INJECTION, SOLUTION EPIDURAL; INFILTRATION; INTRACAUDAL; PERINEURAL at 20:30

## 2022-10-13 RX ADMIN — SILVER NITRATE APPLICATORS 1 APPLICATION: 25; 75 STICK TOPICAL at 20:31

## 2022-10-13 NOTE — ED TRIAGE NOTES
Patient to er from home with c/o he had skin cancer removed from side of his face yesterday. Patient reported he changed the dressing today and the site will not stop bleeding. Patient stated he is on blood thinners. Patient has mask on in triage along with staff.

## 2022-10-13 NOTE — ED PROVIDER NOTES
EMERGENCY DEPARTMENT ENCOUNTER    Room Number:  18/18  Date of encounter:  10/14/2022  PCP: Damian Sanchez MD  Historian: Patient      HPI:  Chief Complaint: Bleeding from surgical site      Context: Madhu Santoro is a 76 y.o. male who presents to the ED c/o bleeding from a site on his face where a skin cancer removed yesterday.  Patient states he removed the bandage today and began bleeding and has been unable to stop the bleeding.  The patient is on aspirin and Eliquis.  The patient denies headaches, dizziness, visual changes, lightheadedness, chest pain or shortness of breath      PAST MEDICAL HISTORY  Active Ambulatory Problems     Diagnosis Date Noted   • Anxiety    • Arthritis    • Chronic coronary artery disease    • HLD (hyperlipidemia)    • Benign essential hypertension    • DDD (degenerative disc disease), lumbosacral    • Cerebrovascular accident (HCC)    • Encounter for screening for cardiovascular disorders 11/20/2012   • Gastroesophageal reflux disease 04/04/2016   • Hyperlipidemia 04/04/2016   • Stenosis of carotid artery 04/26/2017   • Former smoker 04/26/2017   • History of cardiac catheterization 12/16/2011   • S/P ablation of atrial flutter 04/26/2017   • Typical atrial flutter (HCC) 04/26/2017   • S/P CABG (coronary artery bypass graft) 04/26/2017   • Adenomatous polyp of ascending colon 02/12/2019   • Abdominal bloating 02/12/2019   • Stroke (HCC) 03/29/2019   • PAD (peripheral artery disease) ([facility]) 03/29/2019   • Acute cholecystitis 09/06/2019   • Right upper quadrant abdominal pain 09/06/2019   • Chronic pain of both hips 03/31/2021   • Chronic bilateral low back pain without sciatica 03/31/2021   • Sacroiliac joint dysfunction of both sides 03/31/2021   • Encounter for long-term (current) use of high-risk medication 03/31/2021   • Lumbar facet arthropathy 03/31/2021   • Stroke-like symptoms 04/10/2021   • CVA (cerebral vascular accident) ([facility]) 04/11/2021   • Personal history of colonic  polyps 07/23/2021   • Arthralgia of multiple joints 09/28/2021   • Iron deficiency 08/16/2022   • Thrombocytosis 08/16/2022   • Left ureteral stone 08/22/2022   • Obstructive uropathy 08/22/2022   • Chronic anticoagulation 08/22/2022   • Facial skin lesion 08/22/2022   • Iron deficiency anemia 09/23/2022     Resolved Ambulatory Problems     Diagnosis Date Noted   • No Resolved Ambulatory Problems     Past Medical History:   Diagnosis Date   • Atrial flutter (HCC)    • Mandel esophagus    • CAD (coronary artery disease)    • Carotid artery disease (HCC)    • Colonic polyp    • Cyst of pancreas    • GERD (gastroesophageal reflux disease)    • H/O bone density study 2013   • H/O complete eye exam 2014   • History of kidney stone    • Hypertension    • Kidney stone    • Lipid screening 05/31/2013   • Low back pain    • Screening for prostate cancer 07/07/2015   • Skin cancer          PAST SURGICAL HISTORY  Past Surgical History:   Procedure Laterality Date   • CARDIAC CATHETERIZATION N/A 04/10/2017    Procedure: Left Heart Cath;  Surgeon: Marjorie Healy MD;  Location: [facility] CATH INVASIVE LOCATION;  Service:    • CARDIAC CATHETERIZATION N/A 04/10/2017    Procedure: Coronary angiography;  Surgeon: Marjorie Healy MD;  Location: [facility] CATH INVASIVE LOCATION;  Service:    • CARDIAC CATHETERIZATION N/A 04/10/2017    Procedure: Left ventriculography;  Surgeon: Marjorie Healy MD;  Location: [facility] CATH INVASIVE LOCATION;  Service:    • CARDIAC CATHETERIZATION  2011   • CARDIAC ELECTROPHYSIOLOGY PROCEDURE N/A 04/18/2017    Procedure: Ablation atrial flutter;  Surgeon: Jose Antonio Gustafson MD;  Location: [facility] CATH INVASIVE LOCATION;  Service:    • CAROTID ENDARTERECTOMY     • CHOLECYSTECTOMY     • CHOLECYSTECTOMY WITH INTRAOPERATIVE CHOLANGIOGRAM N/A 09/07/2019    Procedure: Laparoscopic cholecystectomy with intraoperative cholangiogram;  Surgeon: Gauri Travis MD;  Location: [facility] MAIN OR;  Service: General   •  COLONOSCOPY  01/06/2015    Diverticulosis, one TA   • COLONOSCOPY N/A 02/14/2019    tics, NBIH, adenomatous polyp x 2   • COLONOSCOPY N/A 11/05/2021    Procedure: COLONOSCOPY TO CECUM AND TERM. ILEUM WITH COLD POLYPECTOMIES;  Surgeon: Everton Abel MD;  Location: [facility] ENDOSCOPY;  Service: Gastroenterology;  Laterality: N/A;  PRE OP - PERS H/O POLYPS  POST OP - COLON POLYPS,, DIVERTICULOSIS, HEMORRHOIDS   • CORONARY ARTERY BYPASS GRAFT N/A 04/11/2017    Procedure: AR STERNOTOMY CORONARY ARTERY BYPASS GRAFT TIMES 3 USING LEFT INTERNAL MAMMARY ARTERY AND LEFT GREATER SAPHENOUS VEIN GRAFT PER ENDOSCOPIC VEIN HARVESTING AND PRP ;  Surgeon: Temo Cortez MD;  Location: [facility] MAIN OR;  Service:    • ENDOSCOPY  01/06/2015    HH, Ervin's esophagus   • ENDOSCOPY N/A 02/14/2019    Z line irregular, HH, Ervin's esophagus   • ENDOSCOPY N/A 11/05/2021    Procedure: ESOPHAGOGASTRODUODENOSCOPY WITH BIOPSIES;  Surgeon: Everton Abel MD;  Location: [facility] ENDOSCOPY;  Service: Gastroenterology;  Laterality: N/A;  PRE OP - PERS H/O ERVIN'S  POST OP - IRREG Z LINE   • KNEE SURGERY Left    • URETEROSCOPY LASER LITHOTRIPSY WITH STENT INSERTION Left 8/23/2022    Procedure: Cysto retrograde with left uretro stent placement;  Surgeon: Damien Oliveira MD;  Location: [facility] MAIN OR;  Service: Urology;  Laterality: Left;   • VASECTOMY           FAMILY HISTORY  Family History   Problem Relation Age of Onset   • Hypertension Mother    • Heart disease Mother    • Heart attack Mother    • Stroke Mother    • Heart disease Father    • Heart attack Father    • Stroke Father    • Hypertension Sister    • Heart attack Brother    • Heart disease Brother    • No Known Problems Brother    • Heart disease Brother    • Heart attack Brother    • Diabetes Brother    • No Known Problems Maternal Grandmother    • No Known Problems Maternal Grandfather    • No Known Problems Paternal Grandmother    • No Known Problems Paternal  Grandfather    • Pancreatic cancer Paternal Cousin    • Arthritis Other    • Diabetes Other    • Hypertension Other    • Kidney disease Other         stones   • Malig Hyperthermia Neg Hx          SOCIAL HISTORY  Social History     Socioeconomic History   • Marital status:      Spouse name: Brooke   • Years of education: 9th grade   Tobacco Use   • Smoking status: Former     Packs/day: 1.00     Types: Cigarettes     Start date: 1959     Quit date: 2009     Years since quittin.7   • Smokeless tobacco: Never   • Tobacco comments:     CAFFEINE USE   Vaping Use   • Vaping Use: Never used   Substance and Sexual Activity   • Alcohol use: Yes     Comment: rarely   • Drug use: No   • Sexual activity: Yes     Partners: Female         ALLERGIES  Atorvastatin and Penicillins        REVIEW OF SYSTEMS  Review of Systems       All systems reviewed and negative except for those discussed in HPI.     PHYSICAL EXAM    I have reviewed the triage vital signs and nursing notes.    ED Triage Vitals   Temp Heart Rate Resp BP SpO2   10/13/22 1756 10/13/22 1756 10/13/22 1756 10/13/22 1845 10/13/22 1756   97.7 °F (36.5 °C) 65 20 149/69 98 %      Temp src Heart Rate Source Patient Position BP Location FiO2 (%)   10/13/22 175 -- -- 10/13/22 1845 --   Tympanic   Left arm        GENERAL: 76-year well developed, well nourished in no acute distress  HENT: NCAT, neck supple, trachea midline  EYES: no scleral icterus, PERRL, normal conjunctivaepresen  NEURO: alert,  sensory and motor function of extremities intact, speech clear, mental status normal  SKIN: Patient is a superficial surgical excision lateral to his right eye with active bright red oozing.  PSYCH:  Appropriate mood and affect      PPE  Pt does not present with symptoms for COVID19; however, I was wearing a mask and goggles throughout all patient interaction.    Vital signs and nursing notes reviewed.      LAB RESULTS  No results found for this or any previous visit  (from the past 24 hour(s)).    Ordered the above labs and independently reviewed the results.        RADIOLOGY  No Radiology Exams Resulted Within Past 24 Hours    I ordered the above noted radiological studies. Independently reviewed by me and discussed with radiologist.  See dictation above for official radiology interpretation.      PROCEDURES    Procedures        MEDICATIONS GIVEN IN ER    Medications   lidocaine-EPINEPHrine (XYLOCAINE W/EPI) 1 %-1:211578 injection 1 mL (1 mL Infiltration Given by Other 10/13/22 2030)   silver nitrate 75-25 % applicator 1 application (1 application Topical Given by Other 10/13/22 2031)         PROGRESS, DATA ANALYSIS, CONSULTS, AND MEDICAL DECISION MAKING    All labs have been independently reviewed by me.  All radiology studies have been reviewed by me and discussed with radiologist dictating report.   EKG's independently reviewed by me.  Discussion below represents my analysis of pertinent findings related to patient's condition, differential diagnosis, treatment plan and final disposition.      ED Course as of 10/14/22 0134   Thu Oct 13, 2022   1914 I am having the patient hold direct pressure on the bleeding will obtain lidocaine with epi, Surgicel and silver nitrate in order to stop the bleeding.  I discussed the above with the patient and he understands and agrees with the plan. [GP]   1930 Used 5 cc of lidocaine with epi and then held direct pressure and used 2 silver nitrate swabs with good hemostasis.  I then covered the wound with Surgicel.  I watch the patient for 45 minutes and there was no return of bleeding.  I then placed a Band-Aid.  Advised the patient to keep the bandage in place for 48 hours and no Eliquis for 48 hours.  Patient understands the risks and understands and agrees the plan. [GP]      ED Course User Index  [GP] Mariano Alberto MD             AS OF 01:34 EDT VITALS:    BP - 153/68  HR - 61  TEMP - 97.7 °F (36.5 °C) (Tympanic)  02 SATS -  98%        DIAGNOSIS  Final diagnoses:   Bleeding from wound   Chronic anticoagulation         DISPOSITION  DISCHARGE    Patient discharged in stable condition.    Reviewed implications of results, diagnosis, meds, responsibility to follow up, warning signs and symptoms of possible worsening, potential complications and reasons to return to ER, including uncontrollable bleeding, redness or fever.    Patient/Family voiced understanding of above instructions.    Discussed plan for discharge, as there is no emergent indication for admission.  Pt/family is agreeable and understands need for follow up and repeat testing.  Pt is aware that discharge does not mean that nothing is wrong but it indicates no emergency is present and they must continue care with follow-up as given below or physician of their choice.     FOLLOW-UP  Damian Sanchez MD  [id number] [id number]  [phone]    In 1 week  For recheck                Note Disclaimer: At [facility], we believe that sharing information builds trust and better relationships. You are receiving this note because you recently visited [facility]. It is possible you will see health information before a provider has talked with you about it. This kind of information can be easy to misunderstand. To help you fully understand what it means for your health, we urge you to discuss this note with your provider.                  EMR Dragon/Transcription disclaimer:          Part of this note may be an electronic transcription/translation of spoken language to printed text using the Dragon dictation system.         Mariano Alberto MD  10/14/22 0134

## 2022-10-14 NOTE — DISCHARGE INSTRUCTIONS
Keep dressing in place until Dung morning.  Hold your Eliquis until Dung morning.  Follow-up with her dermatologist next week or return to the emergency room for return of bleeding.

## 2022-11-01 DIAGNOSIS — G89.29 CHRONIC PAIN OF BOTH HIPS: ICD-10-CM

## 2022-11-01 DIAGNOSIS — M25.551 CHRONIC PAIN OF BOTH HIPS: ICD-10-CM

## 2022-11-01 DIAGNOSIS — G89.29 CHRONIC BILATERAL LOW BACK PAIN WITHOUT SCIATICA: ICD-10-CM

## 2022-11-01 DIAGNOSIS — M54.50 CHRONIC BILATERAL LOW BACK PAIN WITHOUT SCIATICA: ICD-10-CM

## 2022-11-01 DIAGNOSIS — M25.552 CHRONIC PAIN OF BOTH HIPS: ICD-10-CM

## 2022-11-01 RX ORDER — HYDROCODONE BITARTRATE AND ACETAMINOPHEN 10; 325 MG/1; MG/1
1 TABLET ORAL EVERY 6 HOURS PRN
Qty: 60 TABLET | Refills: 0 | Status: SHIPPED | OUTPATIENT
Start: 2022-11-01 | End: 2022-12-05 | Stop reason: SDUPTHER

## 2022-11-01 NOTE — TELEPHONE ENCOUNTER
Medication Refill Request    Date of phone call: 22    Medication being requested: Norco  mg    si tab po q 6 hrs prn  Qty: 60    Date of last visit: 10/7/22    Date of last refill:     RUTH up to date?:     Next Follow up?: 22    Any new pertinent information? (i.e, new medication allergies, new use of medications, change in patient's health or condition, non-compliance or inconsistency with prescribing agreement?):

## 2022-12-05 ENCOUNTER — OFFICE VISIT (OUTPATIENT)
Dept: PAIN MEDICINE | Facility: CLINIC | Age: 77
End: 2022-12-05

## 2022-12-05 VITALS
BODY MASS INDEX: 25.88 KG/M2 | TEMPERATURE: 97.7 F | HEART RATE: 53 BPM | HEIGHT: 68 IN | OXYGEN SATURATION: 97 % | DIASTOLIC BLOOD PRESSURE: 67 MMHG | WEIGHT: 170.8 LBS | SYSTOLIC BLOOD PRESSURE: 156 MMHG

## 2022-12-05 DIAGNOSIS — M25.50 ARTHRALGIA OF MULTIPLE JOINTS: ICD-10-CM

## 2022-12-05 DIAGNOSIS — M25.552 CHRONIC PAIN OF BOTH HIPS: ICD-10-CM

## 2022-12-05 DIAGNOSIS — Z79.899 ENCOUNTER FOR LONG-TERM (CURRENT) USE OF HIGH-RISK MEDICATION: ICD-10-CM

## 2022-12-05 DIAGNOSIS — G89.29 CHRONIC BILATERAL LOW BACK PAIN WITHOUT SCIATICA: Primary | ICD-10-CM

## 2022-12-05 DIAGNOSIS — G89.29 CHRONIC PAIN OF BOTH HIPS: ICD-10-CM

## 2022-12-05 DIAGNOSIS — M54.50 CHRONIC BILATERAL LOW BACK PAIN WITHOUT SCIATICA: Primary | ICD-10-CM

## 2022-12-05 DIAGNOSIS — M25.551 CHRONIC PAIN OF BOTH HIPS: ICD-10-CM

## 2022-12-05 PROCEDURE — 99214 OFFICE O/P EST MOD 30 MIN: CPT

## 2022-12-05 RX ORDER — HYDROCODONE BITARTRATE AND ACETAMINOPHEN 10; 325 MG/1; MG/1
1 TABLET ORAL EVERY 6 HOURS PRN
Qty: 75 TABLET | Refills: 0 | Status: SHIPPED | OUTPATIENT
Start: 2022-12-05 | End: 2022-12-28 | Stop reason: SDUPTHER

## 2022-12-05 NOTE — PROGRESS NOTES
"CHIEF COMPLAINT  Back and joint pain    Subjective   Madhu Santoro is a 76 y.o. male  who presents for follow-up.  He has a history of back and joint pain. He reports that his pain has worsened since his last office visit.      Today pain is 9/10VAS in severity. Pain is located in his lumbar spine and runs across his belt line. He reports occasional radiating pain to bilateral legs (\"entire leg\"). He reports pain in bilateral hands and numbness to bilateral feet. Describes this pain as a nearly continuous aching and throbbing. Pain is worsened by prolonged walking or standing, increased physical activity, and bending/twisitng. Pain improves with rest/reposition, use of a heating pad, and medication. He reports that he has tried PT in the past but this only caused increased pain.     Continues with Hydrocodone 10mg 2/day and OTC Tylenol PRN. Reports occasional constipation that is managed by Miralax and laxatives. Denies any other side effects from the regimen including somnolence. The regimen helps \"take the edge off\". Notes improvement in activity and function with regimen. ADL's by self. Denies any bowel or bladder changes.     Not a candidate for NSAIDs - history of TIA's and remains on Eliquis    Continues to have no interest in interventional procedures due to a friend having developed foot drop following a procedure for back pain.     Back Pain  This is a chronic problem. The current episode started more than 1 year ago. The problem occurs constantly. The problem has been gradually worsening since onset. The pain is present in the lumbar spine. The quality of the pain is described as aching (throbbing). Radiates to: to bilateral legs L > R  The pain is at a severity of 9/10. The symptoms are aggravated by standing, sitting, twisting, bending, lying down and position (walking long distances, weather changes). Stiffness is present all day. Associated symptoms include headaches, numbness and weakness. Pertinent " negatives include no abdominal pain, chest pain, dysuria or fever. He has tried heat and analgesics (rest/reposition, PT in the past (this caused increased pain)) for the symptoms. The treatment provided mild relief.   Joint Pain  This is a chronic (bilateral hands, left shoulder, bilateral knees) problem. The current episode started more than 1 year ago. The problem occurs daily. The problem has been gradually worsening. Associated symptoms include arthralgias, fatigue, headaches, numbness and weakness. Pertinent negatives include no abdominal pain, chest pain, chills, congestion, coughing or fever. The symptoms are aggravated by walking, standing and bending (weather changes, increased physical activity, ). He has tried oral narcotics, position changes, rest, heat, lying down and acetaminophen for the symptoms. The treatment provided moderate relief.      PEG Assessment   What number best describes your pain on average in the past week?9  What number best describes how, during the past week, pain has interfered with your enjoyment of life?9  What number best describes how, during the past week, pain has interfered with your general activity?  9    The following portions of the patient's history were reviewed and updated as appropriate: allergies, current medications, past family history, past medical history, past social history, past surgical history and problem list.    Review of Systems   Constitutional: Positive for fatigue. Negative for activity change, chills and fever.   HENT: Negative for congestion.    Eyes: Negative for visual disturbance.   Respiratory: Negative for cough, chest tightness and shortness of breath.    Cardiovascular: Negative for chest pain.   Gastrointestinal: Positive for constipation. Negative for abdominal pain and diarrhea.   Genitourinary: Negative for difficulty urinating and dysuria.   Musculoskeletal: Positive for arthralgias and back pain.   Neurological: Positive for weakness,  "numbness and headaches. Negative for dizziness and light-headedness.   Psychiatric/Behavioral: Positive for sleep disturbance. Negative for agitation. The patient is not nervous/anxious.      I have reviewed and confirmed the accuracy of the ROS as documented by the MA/LPN/RN MY Perez    Vitals:    12/05/22 1052   BP: 156/67   Pulse: 53   Temp: 97.7 °F (36.5 °C)   SpO2: 97%   Weight: 77.5 kg (170 lb 12.8 oz)   Height: 172.7 cm (68\")   PainSc:   9   PainLoc: Back     Objective   Physical Exam  Constitutional:       Appearance: Normal appearance.   HENT:      Head: Normocephalic.   Cardiovascular:      Rate and Rhythm: Normal rate and regular rhythm.   Pulmonary:      Effort: Pulmonary effort is normal.      Breath sounds: Normal breath sounds.   Musculoskeletal:      Cervical back: Normal range of motion.      Lumbar back: Tenderness present. Decreased range of motion. Positive right straight leg raise test and positive left straight leg raise test.      Comments: Diffuse arthritic changes   Skin:     General: Skin is warm and dry.      Capillary Refill: Capillary refill takes less than 2 seconds.   Neurological:      General: No focal deficit present.      Mental Status: He is alert and oriented to person, place, and time.   Psychiatric:         Mood and Affect: Mood normal.         Behavior: Behavior normal.         Thought Content: Thought content normal.         Cognition and Memory: Cognition normal.       Assessment & Plan   Diagnoses and all orders for this visit:    1. Chronic bilateral low back pain without sciatica (Primary)  -     HYDROcodone-acetaminophen (NORCO)  MG per tablet; Take 1 tablet by mouth Every 6 (Six) Hours As Needed for Severe Pain. Take 1 tablet by mouth every 6 hours. Take at least 6 hours apart. Max 2/day. 30 day supply  Dispense: 75 tablet; Refill: 0    2. Chronic pain of both hips  -     HYDROcodone-acetaminophen (NORCO)  MG per tablet; Take 1 tablet by mouth " Every 6 (Six) Hours As Needed for Severe Pain. Take 1 tablet by mouth every 6 hours. Take at least 6 hours apart. Max 2/day. 30 day supply  Dispense: 75 tablet; Refill: 0    3. Arthralgia of multiple joints    4. Encounter for long-term (current) use of high-risk medication    --- The urine drug screen confirmation from 8/8/22 has been reviewed and the result is appropriate based on patient history and RUTH report  --- The patient signed an updated copy of the controlled substance agreement on 6/6/22  --- Refill Hydrocodone. Increase to #75. DNF 12/7/22. Patient appears stable with current regimen. No adverse effects. Regarding continuation of opioids, there is no evidence of aberrant behavior or any red flags.  The patient continues with appropriate response to opioid therapy. ADL's remain intact by self. Discussed with patient that no further increases will be made to medication. If pain continues to worsen, patient should consider updating lumbar imaging and interventional procedures.   --- Follow-up 1 months     RUTH REPORT  As part of the patient's treatment plan, I am prescribing controlled substances. The patient has been made aware of appropriate use of such medications, including potential risk of somnolence, limited ability to drive and/or work safely, and the potential for dependence or overdose. It has also been made clear that these medications are for use by this patient only, without concomitant use of alcohol or other substances unless prescribed.     Patient has completed prescribing agreement detailing terms of continued prescribing of controlled substances, including monitoring RUTH reports, urine drug screening, and pill counts if necessary. The patient is aware that inappropriate use will results in cessation of prescribing such medications.    As the clinician, I personally reviewed the RUTH from 12/5/22 while the patient was in the office today.    History and physical exam exhibit  continued safe and appropriate use of controlled substances.    Dictated utilizing Dragon dictation.     Patient remained masked during entire encounter. No cough present. I donned a mask and eye protection throughout entire visit. Prior to donning mask and eye protection, hand hygiene was performed, as well as when it was doffed.  I was closer than 6 feet, but not for an extended period of time. No obvious exposure to any bodily fluids.

## 2022-12-08 ENCOUNTER — TELEPHONE (OUTPATIENT)
Dept: PAIN MEDICINE | Facility: CLINIC | Age: 77
End: 2022-12-08

## 2022-12-08 DIAGNOSIS — M25.552 CHRONIC PAIN OF BOTH HIPS: ICD-10-CM

## 2022-12-08 DIAGNOSIS — G89.29 CHRONIC PAIN OF BOTH HIPS: ICD-10-CM

## 2022-12-08 DIAGNOSIS — G89.29 CHRONIC BILATERAL LOW BACK PAIN WITHOUT SCIATICA: ICD-10-CM

## 2022-12-08 DIAGNOSIS — M25.551 CHRONIC PAIN OF BOTH HIPS: ICD-10-CM

## 2022-12-08 DIAGNOSIS — M54.50 CHRONIC BILATERAL LOW BACK PAIN WITHOUT SCIATICA: ICD-10-CM

## 2022-12-08 NOTE — TELEPHONE ENCOUNTER
Can you please verify what date he picked it up and if I can send an extra 15 pills? If he as not picked it up, can you cancel current prescription and I will send a whole new one? Thanks.

## 2022-12-08 NOTE — TELEPHONE ENCOUNTER
He picked up 60 tabs on 12/7//22. He stated if you want him to have the 75 you will need the sig to say take 2-3 a day as need must last 30 days.

## 2022-12-08 NOTE — TELEPHONE ENCOUNTER
Please let patient know to let us know if he runs out before his next office visit and I will send the remaining 15 then. If he can make it until his next office visit, I will send 75 in at that time.

## 2022-12-28 RX ORDER — HYDROCODONE BITARTRATE AND ACETAMINOPHEN 10; 325 MG/1; MG/1
1 TABLET ORAL EVERY 6 HOURS PRN
Qty: 15 TABLET | Refills: 0 | Status: SHIPPED | OUTPATIENT
Start: 2022-12-28 | End: 2023-01-05 | Stop reason: SDUPTHER

## 2022-12-28 NOTE — TELEPHONE ENCOUNTER
Caller: Madhu Santoro    Relationship: Self    Best call back number: [phone]    What is the best time to reach you: ANY     Who are you requesting to speak with (clinical staff, provider,  specific staff member): PROVIDER     Do you know the name of the person who called: YES     What was the call regarding: PATIENT STATES THAT THEY ARE NOW OUT OF MEDICATION OF THE HYDROCODONE

## 2022-12-28 NOTE — TELEPHONE ENCOUNTER
Called and spoke to pt. He sts he is out of norco. Informed him Marisol is not in the office today and I will ask another APRN if she is willing to send qty 15 norco in for her. Please review Dione message from 12/8/22. Would you like to send in a qty of 15 norco to pharmacy for Dione? Please advise. Thank you.

## 2023-01-04 ENCOUNTER — TELEPHONE (OUTPATIENT)
Dept: FAMILY MEDICINE CLINIC | Facility: CLINIC | Age: 78
End: 2023-01-04
Payer: MEDICARE

## 2023-01-04 DIAGNOSIS — D75.839 THROMBOCYTOSIS: ICD-10-CM

## 2023-01-04 DIAGNOSIS — E87.5 HYPERKALEMIA: Primary | ICD-10-CM

## 2023-01-04 DIAGNOSIS — I10 BENIGN ESSENTIAL HYPERTENSION: ICD-10-CM

## 2023-01-04 DIAGNOSIS — E78.5 HYPERLIPIDEMIA, UNSPECIFIED HYPERLIPIDEMIA TYPE: ICD-10-CM

## 2023-01-05 ENCOUNTER — OFFICE VISIT (OUTPATIENT)
Dept: PAIN MEDICINE | Facility: CLINIC | Age: 78
End: 2023-01-05
Payer: MEDICARE

## 2023-01-05 VITALS
RESPIRATION RATE: 16 BRPM | HEART RATE: 55 BPM | WEIGHT: 174 LBS | OXYGEN SATURATION: 96 % | SYSTOLIC BLOOD PRESSURE: 155 MMHG | DIASTOLIC BLOOD PRESSURE: 76 MMHG | BODY MASS INDEX: 26.37 KG/M2 | TEMPERATURE: 97.7 F | HEIGHT: 68 IN

## 2023-01-05 DIAGNOSIS — M25.551 CHRONIC PAIN OF BOTH HIPS: ICD-10-CM

## 2023-01-05 DIAGNOSIS — G89.29 CHRONIC PAIN OF BOTH HIPS: ICD-10-CM

## 2023-01-05 DIAGNOSIS — G89.29 CHRONIC BILATERAL LOW BACK PAIN WITHOUT SCIATICA: ICD-10-CM

## 2023-01-05 DIAGNOSIS — M54.50 CHRONIC BILATERAL LOW BACK PAIN WITHOUT SCIATICA: ICD-10-CM

## 2023-01-05 DIAGNOSIS — M25.552 CHRONIC PAIN OF BOTH HIPS: ICD-10-CM

## 2023-01-05 PROCEDURE — 99214 OFFICE O/P EST MOD 30 MIN: CPT

## 2023-01-05 RX ORDER — HYDROCODONE BITARTRATE AND ACETAMINOPHEN 10; 325 MG/1; MG/1
1 TABLET ORAL EVERY 8 HOURS PRN
Qty: 75 TABLET | Refills: 0 | Status: SHIPPED | OUTPATIENT
Start: 2023-01-05 | End: 2023-02-06 | Stop reason: SDUPTHER

## 2023-01-05 NOTE — PROGRESS NOTES
CHIEF COMPLAINT  Back and joint pain    Subjective   Madhu Santoro is a 77 y.o. male  who presents for follow-up.  He has a history of back and joint pain. He reports that his pain has remained consistent since his last office visit.      Today pain is 9/10VAS in severity. Pain is located in his lumbar spine and runs across his belt line. He reports occasional radiating pain to bilateral legs (\"entire leg\"). He reports pain in bilateral hands and numbness to bilateral feet. Describes this pain as a nearly continuous aching and throbbing. Pain is worsened by prolonged walking or standing, increased physical activity, and bending/twisitng. Pain improves with rest/reposition, use of a heating pad, and medication. He reports that he has tried PT in the past but this only caused increased pain.     Continues with Hydrocodone 10mg 2-3/day (this was increased at his last office visit) and OTC Tylenol PRN. Reports occasional constipation that is managed by Miralax and laxatives. Denies any other side effects from the regimen including somnolence. The regimen helps \"take the edge off\". Notes improvement in activity and function with regimen. ADL's by self. Denies any bowel or bladder changes.      Not a candidate for NSAIDs - history of TIA's and remains on Eliquis     Continues to have no interest in interventional procedures due to a friend having developed foot drop following a procedure for back pain.     Back Pain  This is a chronic problem. The current episode started more than 1 year ago. The problem occurs constantly. The problem has been waxing and waning since onset. The pain is present in the lumbar spine. The quality of the pain is described as aching (throbbing). Radiates to: to bilateral legs L > R  The pain is at a severity of 9/10. The symptoms are aggravated by standing, sitting, twisting, bending, lying down and position (walking long distances, weather changes). Stiffness is present all day. Associated  symptoms include headaches, numbness and weakness. Pertinent negatives include no abdominal pain, chest pain, dysuria or fever. He has tried heat and analgesics (rest/reposition, PT in the past (this caused increased pain)) for the symptoms. The treatment provided mild relief.   Joint Pain  This is a chronic (bilateral hands, left shoulder, bilateral knees) problem. The current episode started more than 1 year ago. The problem occurs daily. The problem has been gradually worsening. Associated symptoms include arthralgias, fatigue, headaches, numbness and weakness. Pertinent negatives include no abdominal pain, chest pain, chills, congestion, coughing, fever or joint swelling. The symptoms are aggravated by walking, standing and bending (weather changes, increased physical activity, ). He has tried oral narcotics, position changes, rest, heat, lying down and acetaminophen for the symptoms. The treatment provided moderate relief.     PEG Assessment   What number best describes your pain on average in the past week?9  What number best describes how, during the past week, pain has interfered with your enjoyment of life?8  What number best describes how, during the past week, pain has interfered with your general activity?  8    The following portions of the patient's history were reviewed and updated as appropriate: allergies, current medications, past family history, past medical history, past social history, past surgical history and problem list.    Review of Systems   Constitutional: Positive for fatigue. Negative for chills and fever.   HENT: Negative for congestion.    Eyes: Negative for visual disturbance.   Respiratory: Negative for cough and shortness of breath.    Cardiovascular: Negative for chest pain.   Gastrointestinal: Positive for constipation. Negative for abdominal pain and diarrhea.   Genitourinary: Negative for dysuria.   Musculoskeletal: Positive for arthralgias and back pain. Negative for joint  swelling.   Neurological: Positive for weakness, numbness and headaches. Seizures: Bilateral feet    Psychiatric/Behavioral: Positive for sleep disturbance.     I have reviewed and confirmed the accuracy of the ROS as documented by the MA/XIOMARAN/RN MY Perez    Vitals:    01/05/23 0939   BP: 155/76   Pulse: 55   Resp: 16   Temp: 97.7 °F (36.5 °C)   SpO2: 96%   Weight: 78.9 kg (174 lb)   Height: 172.7 cm (67.99\")   PainSc:   9   PainLoc: Back     Objective   Physical Exam  Constitutional:       Appearance: Normal appearance.   HENT:      Head: Normocephalic.   Cardiovascular:      Rate and Rhythm: Normal rate and regular rhythm.   Pulmonary:      Effort: Pulmonary effort is normal.      Breath sounds: Normal breath sounds.   Musculoskeletal:      Cervical back: Normal range of motion.      Lumbar back: Tenderness present. Decreased range of motion. Positive right straight leg raise test and positive left straight leg raise test.      Comments: Diffuse arthritic changes   Skin:     General: Skin is warm and dry.      Capillary Refill: Capillary refill takes less than 2 seconds.   Neurological:      General: No focal deficit present.      Mental Status: He is alert and oriented to person, place, and time.   Psychiatric:         Mood and Affect: Mood normal.         Behavior: Behavior normal.         Thought Content: Thought content normal.         Cognition and Memory: Cognition normal.       Assessment & Plan   Diagnoses and all orders for this visit:    1. Chronic bilateral low back pain without sciatica  -     HYDROcodone-acetaminophen (NORCO)  MG per tablet; Take 1 tablet by mouth Every 8 (Eight) Hours As Needed for Severe Pain.  Dispense: 75 tablet; Refill: 0    2. Chronic pain of both hips  -     HYDROcodone-acetaminophen (NORCO)  MG per tablet; Take 1 tablet by mouth Every 8 (Eight) Hours As Needed for Severe Pain.  Dispense: 75 tablet; Refill: 0    --- The urine drug screen confirmation  from 8/8/22 has been reviewed and the result is appropriate based on patient history and RUTH report  --- The patient signed an updated copy of the controlled substance agreement on 6/6/22  --- Refill Hydrocodone. Remainder of prescription was sent over to pharmacy on 12/28/22 but patient states he never picked this us. Verified with pharmacy. Patient appears stable with current regimen. No adverse effects. Regarding continuation of opioids, there is no evidence of aberrant behavior or any red flags. The patient continues with appropriate response to opioid therapy. ADL's remain intact by self.   --- Follow-up 1 month     RUTH REPORT  As part of the patient's treatment plan, I am prescribing controlled substances. The patient has been made aware of appropriate use of such medications, including potential risk of somnolence, limited ability to drive and/or work safely, and the potential for dependence or overdose. It has also been made clear that these medications are for use by this patient only, without concomitant use of alcohol or other substances unless prescribed.     Patient has completed prescribing agreement detailing terms of continued prescribing of controlled substances, including monitoring RUTH reports, urine drug screening, and pill counts if necessary. The patient is aware that inappropriate use will results in cessation of prescribing such medications.    As the clinician, I personally reviewed the RUTH from 1/5/23 while the patient was in the office today.    History and physical exam exhibit continued safe and appropriate use of controlled substances.    Dictated utilizing Dragon dictation.     Patient remained masked during entire encounter. No cough present. I donned a mask and eye protection throughout entire visit. Prior to donning mask and eye protection, hand hygiene was performed, as well as when it was doffed.  I was closer than 6 feet, but not for an extended period of time. No  obvious exposure to any bodily fluids.

## 2023-01-06 NOTE — PROGRESS NOTES
Subjective   Madhu Santoro is a 77 y.o. male.     History of Present Illness     Chief Complaint:   Chief Complaint   Patient presents with   • Hypertension     Med refill / lab results   / [facility]roger    • Hyperlipidemia   • Heartburn       Madhu Santoro 77 y.o. male who presents today for Medical Management of the below listed issues. He  has a problem list of   Patient Active Problem List   Diagnosis   • Anxiety   • Arthritis   • Chronic coronary artery disease   • HLD (hyperlipidemia)   • Benign essential hypertension   • DDD (degenerative disc disease), lumbosacral   • Cerebrovascular accident (HCC)   • Encounter for screening for cardiovascular disorders   • Gastroesophageal reflux disease   • Hyperlipidemia   • Stenosis of carotid artery   • Former smoker   • History of cardiac catheterization   • S/P ablation of atrial flutter   • Typical atrial flutter ([facility])   • S/P CABG (coronary artery bypass graft)   • Adenomatous polyp of ascending colon   • Abdominal bloating   • Stroke ([facility])   • PAD (peripheral artery disease) ([facility])   • Acute cholecystitis   • Right upper quadrant abdominal pain   • Chronic pain of both hips   • Chronic bilateral low back pain without sciatica   • Sacroiliac joint dysfunction of both sides   • Encounter for long-term (current) use of high-risk medication   • Lumbar facet arthropathy   • Stroke-like symptoms   • CVA (cerebral vascular accident) ([facility])   • Personal history of colonic polyps   • Arthralgia of multiple joints   • Iron deficiency   • Thrombocytosis   • Left ureteral stone   • Obstructive uropathy   • Chronic anticoagulation   • Facial skin lesion   • Iron deficiency anemia   .  Since the last visit, He has overall felt well.  he has been compliant with   Current Outpatient Medications:   •  amLODIPine (NORVASC) 2.5 MG tablet, TAKE ONE TABLET BY MOUTH DAILY, Disp: 30 tablet, Rfl: 11  •  apixaban (ELIQUIS) 5 MG tablet tablet, Take 1 tablet by mouth 2 (Two) Times a Day. Lot #  [id number] Exp 10/23, Disp: 28 tablet, Rfl: 0  •  ferrous gluconate (FERGON) 324 MG tablet, Take 1 tablet by mouth Daily With Breakfast., Disp: 30 tablet, Rfl: 2  •  HYDROcodone-acetaminophen (NORCO)  MG per tablet, Take 1 tablet by mouth Every 8 (Eight) Hours As Needed for Severe Pain., Disp: 75 tablet, Rfl: 0  •  mupirocin (BACTROBAN) 2 % cream, Apply 1 application topically to the appropriate area as directed 2 (Two) Times a Day., Disp: 15 g, Rfl: 0  •  Myrbetriq 25 MG tablet sustained-release 24 hour 24 hr tablet, Take 25 mg by mouth Daily., Disp: , Rfl:   •  nitroglycerin (NITROSTAT) 0.4 MG SL tablet, Place 1 tablet under the tongue Every 5 (Five) Minutes As Needed for Chest Pain. Take no more than 3 doses in 15 minutes., Disp: 20 tablet, Rfl: 2  •  pantoprazole (PROTONIX) 40 MG EC tablet, Take 1 tablet by mouth Daily., Disp: 90 tablet, Rfl: 1  •  ramipril (ALTACE) 5 MG capsule, Take 1 capsule by mouth Daily., Disp: 90 capsule, Rfl: 1  •  rosuvastatin (Crestor) 20 MG tablet, Take 1 tablet by mouth Daily., Disp: 90 tablet, Rfl: 1.  He denies medication side effects.    All of the other chronic condition(s) listed above are stable w/o issues.    /67   Pulse 64   Temp 97.6 °F (36.4 °C) (Oral)   Resp 14   Ht 172.7 cm (67.99\")   Wt 78 kg (172 lb)   SpO2 98%   BMI 26.16 kg/m²     Results for orders placed or performed in visit on 01/04/23   Comprehensive metabolic panel    Specimen: Blood   Result Value Ref Range    Glucose 98 65 - 99 mg/dL    BUN 10 8 - 23 mg/dL    Creatinine 1.02 0.76 - 1.27 mg/dL    EGFR Result 75.2 >60.0 mL/min/1.73    BUN/Creatinine Ratio 9.8 7.0 - 25.0    Sodium 139 136 - 145 mmol/L    Potassium 5.0 3.5 - 5.2 mmol/L    Chloride 102 98 - 107 mmol/L    Total CO2 29.4 (H) 22.0 - 29.0 mmol/L    Calcium 9.3 8.6 - 10.5 mg/dL    Total Protein 6.8 6.0 - 8.5 g/dL    Albumin 4.0 3.5 - 5.2 g/dL    Globulin 2.8 gm/dL    A/G Ratio 1.4 g/dL    Total Bilirubin 0.8 0.0 - 1.2 mg/dL    Alkaline  Phosphatase 121 (H) 39 - 117 U/L    AST (SGOT) 24 1 - 40 U/L    ALT (SGPT) 11 1 - 41 U/L   Lipid panel    Specimen: Blood   Result Value Ref Range    Total Cholesterol 119 0 - 200 mg/dL    Triglycerides 96 0 - 150 mg/dL    HDL Cholesterol 38 (L) 40 - 60 mg/dL    VLDL Cholesterol Bakari 18 5 - 40 mg/dL    LDL Chol Calc (NIH) 63 0 - 100 mg/dL   TSH    Specimen: Blood   Result Value Ref Range    TSH 3.350 0.270 - 4.200 uIU/mL   CBC and Differential    Specimen: Blood   Result Value Ref Range    WBC 8.83 3.40 - 10.80 10*3/mm3    RBC 6.85 (H) 4.14 - 5.80 10*6/mm3    Hemoglobin 17.7 13.0 - 17.7 g/dL    Hematocrit 56.8 (H) 37.5 - 51.0 %    MCV 82.9 79.0 - 97.0 fL    MCH 25.8 (L) 26.6 - 33.0 pg    MCHC 31.2 (L) 31.5 - 35.7 g/dL    RDW 19.3 (H) 12.3 - 15.4 %    Platelets 399 140 - 450 10*3/mm3    Neutrophil Rel % 69.9 42.7 - 76.0 %    Lymphocyte Rel % 13.8 (L) 19.6 - 45.3 %    Monocyte Rel % 11.9 5.0 - 12.0 %    Eosinophil Rel % 2.6 0.3 - 6.2 %    Basophil Rel % 0.7 0.0 - 1.5 %    Neutrophils Absolute 6.17 1.70 - 7.00 10*3/mm3    Lymphocytes Absolute 1.22 0.70 - 3.10 10*3/mm3    Monocytes Absolute 1.05 (H) 0.10 - 0.90 10*3/mm3    Eosinophils Absolute 0.23 0.00 - 0.40 10*3/mm3    Basophils Absolute 0.06 0.00 - 0.20 10*3/mm3    Immature Granulocyte Rel % 1.1 (H) 0.0 - 0.5 %    Immature Grans Absolute 0.10 (H) 0.00 - 0.05 10*3/mm3    nRBC 0.0 0.0 - 0.2 /100 WBC             The following portions of the patient's history were reviewed and updated as appropriate: allergies, current medications, past family history, past medical history, past social history, past surgical history, and problem list.    Review of Systems   Constitutional: Negative for activity change, chills and fever.   Respiratory: Negative for cough.    Cardiovascular: Negative for chest pain.   Psychiatric/Behavioral: Negative for dysphoric mood.       Objective   Physical Exam  Constitutional:       General: He is not in acute distress.     Appearance: He is  well-developed.   Cardiovascular:      Rate and Rhythm: Normal rate. Rhythm irregular.   Pulmonary:      Effort: Pulmonary effort is normal.      Breath sounds: Normal breath sounds.   Neurological:      Mental Status: He is alert and oriented to person, place, and time.   Psychiatric:         Behavior: Behavior normal.         Thought Content: Thought content normal.     Labs reviewed with pt today during visit. All questions answered.  Pt sees Hematology and has an appt next week.      Diagnoses and all orders for this visit:    1. Benign essential hypertension (Primary)  -     ramipril (ALTACE) 5 MG capsule; Take 1 capsule by mouth Daily.  Dispense: 90 capsule; Refill: 1  -     Comprehensive metabolic panel; Future  -     Lipid panel; Future  -     CBC and Differential; Future  -     TSH; Future    2. Gastroesophageal reflux disease without esophagitis  -     pantoprazole (PROTONIX) 40 MG EC tablet; Take 1 tablet by mouth Daily.  Dispense: 90 tablet; Refill: 1    3. Coronary artery disease due to lipid rich plaque  -     ramipril (ALTACE) 5 MG capsule; Take 1 capsule by mouth Daily.  Dispense: 90 capsule; Refill: 1  -     Lipid panel; Future    4. Hyperlipidemia, unspecified hyperlipidemia type  -     rosuvastatin (Crestor) 20 MG tablet; Take 1 tablet by mouth Daily.  Dispense: 90 tablet; Refill: 1  -     Lipid panel; Future    5. Screening for prostate cancer  -     PSA; Future

## 2023-01-07 LAB
ALBUMIN SERPL-MCNC: 4 G/DL (ref 3.5–5.2)
ALBUMIN/GLOB SERPL: 1.4 G/DL
ALP SERPL-CCNC: 121 U/L (ref 39–117)
ALT SERPL-CCNC: 11 U/L (ref 1–41)
AST SERPL-CCNC: 24 U/L (ref 1–40)
BASOPHILS # BLD AUTO: 0.06 10*3/MM3 (ref 0–0.2)
BASOPHILS NFR BLD AUTO: 0.7 % (ref 0–1.5)
BILIRUB SERPL-MCNC: 0.8 MG/DL (ref 0–1.2)
BUN SERPL-MCNC: 10 MG/DL (ref 8–23)
BUN/CREAT SERPL: 9.8 (ref 7–25)
CALCIUM SERPL-MCNC: 9.3 MG/DL (ref 8.6–10.5)
CHLORIDE SERPL-SCNC: 102 MMOL/L (ref 98–107)
CHOLEST SERPL-MCNC: 119 MG/DL (ref 0–200)
CO2 SERPL-SCNC: 29.4 MMOL/L (ref 22–29)
CREAT SERPL-MCNC: 1.02 MG/DL (ref 0.76–1.27)
EGFRCR SERPLBLD CKD-EPI 2021: 75.2 ML/MIN/1.73
EOSINOPHIL # BLD AUTO: 0.23 10*3/MM3 (ref 0–0.4)
EOSINOPHIL NFR BLD AUTO: 2.6 % (ref 0.3–6.2)
ERYTHROCYTE [DISTWIDTH] IN BLOOD BY AUTOMATED COUNT: 19.3 % (ref 12.3–15.4)
GLOBULIN SER CALC-MCNC: 2.8 GM/DL
GLUCOSE SERPL-MCNC: 98 MG/DL (ref 65–99)
HCT VFR BLD AUTO: 56.8 % (ref 37.5–51)
HDLC SERPL-MCNC: 38 MG/DL (ref 40–60)
HGB BLD-MCNC: 17.7 G/DL (ref 13–17.7)
IMM GRANULOCYTES # BLD AUTO: 0.1 10*3/MM3 (ref 0–0.05)
IMM GRANULOCYTES NFR BLD AUTO: 1.1 % (ref 0–0.5)
LDLC SERPL CALC-MCNC: 63 MG/DL (ref 0–100)
LYMPHOCYTES # BLD AUTO: 1.22 10*3/MM3 (ref 0.7–3.1)
LYMPHOCYTES NFR BLD AUTO: 13.8 % (ref 19.6–45.3)
MCH RBC QN AUTO: 25.8 PG (ref 26.6–33)
MCHC RBC AUTO-ENTMCNC: 31.2 G/DL (ref 31.5–35.7)
MCV RBC AUTO: 82.9 FL (ref 79–97)
MONOCYTES # BLD AUTO: 1.05 10*3/MM3 (ref 0.1–0.9)
MONOCYTES NFR BLD AUTO: 11.9 % (ref 5–12)
NEUTROPHILS # BLD AUTO: 6.17 10*3/MM3 (ref 1.7–7)
NEUTROPHILS NFR BLD AUTO: 69.9 % (ref 42.7–76)
NRBC BLD AUTO-RTO: 0 /100 WBC (ref 0–0.2)
PLATELET # BLD AUTO: 399 10*3/MM3 (ref 140–450)
POTASSIUM SERPL-SCNC: 5 MMOL/L (ref 3.5–5.2)
PROT SERPL-MCNC: 6.8 G/DL (ref 6–8.5)
RBC # BLD AUTO: 6.85 10*6/MM3 (ref 4.14–5.8)
SODIUM SERPL-SCNC: 139 MMOL/L (ref 136–145)
TRIGL SERPL-MCNC: 96 MG/DL (ref 0–150)
TSH SERPL DL<=0.005 MIU/L-ACNC: 3.35 UIU/ML (ref 0.27–4.2)
VLDLC SERPL CALC-MCNC: 18 MG/DL (ref 5–40)
WBC # BLD AUTO: 8.83 10*3/MM3 (ref 3.4–10.8)

## 2023-01-09 ENCOUNTER — OFFICE VISIT (OUTPATIENT)
Dept: FAMILY MEDICINE CLINIC | Facility: CLINIC | Age: 78
End: 2023-01-09
Payer: MEDICARE

## 2023-01-09 VITALS
RESPIRATION RATE: 14 BRPM | DIASTOLIC BLOOD PRESSURE: 67 MMHG | TEMPERATURE: 97.6 F | HEART RATE: 64 BPM | SYSTOLIC BLOOD PRESSURE: 138 MMHG | WEIGHT: 172 LBS | OXYGEN SATURATION: 98 % | BODY MASS INDEX: 26.07 KG/M2 | HEIGHT: 68 IN

## 2023-01-09 DIAGNOSIS — E78.5 HYPERLIPIDEMIA, UNSPECIFIED HYPERLIPIDEMIA TYPE: Chronic | ICD-10-CM

## 2023-01-09 DIAGNOSIS — K21.9 GASTROESOPHAGEAL REFLUX DISEASE WITHOUT ESOPHAGITIS: Chronic | ICD-10-CM

## 2023-01-09 DIAGNOSIS — I10 BENIGN ESSENTIAL HYPERTENSION: Primary | Chronic | ICD-10-CM

## 2023-01-09 DIAGNOSIS — Z12.5 SCREENING FOR PROSTATE CANCER: ICD-10-CM

## 2023-01-09 DIAGNOSIS — I25.83 CORONARY ARTERY DISEASE DUE TO LIPID RICH PLAQUE: Chronic | ICD-10-CM

## 2023-01-09 DIAGNOSIS — I25.10 CORONARY ARTERY DISEASE DUE TO LIPID RICH PLAQUE: Chronic | ICD-10-CM

## 2023-01-09 PROCEDURE — 99214 OFFICE O/P EST MOD 30 MIN: CPT | Performed by: FAMILY MEDICINE

## 2023-01-09 RX ORDER — PANTOPRAZOLE SODIUM 40 MG/1
40 TABLET, DELAYED RELEASE ORAL DAILY
Qty: 90 TABLET | Refills: 1 | Status: SHIPPED | OUTPATIENT
Start: 2023-01-09

## 2023-01-09 RX ORDER — ROSUVASTATIN CALCIUM 20 MG/1
20 TABLET, COATED ORAL DAILY
Qty: 90 TABLET | Refills: 1 | Status: SHIPPED | OUTPATIENT
Start: 2023-01-09

## 2023-01-09 RX ORDER — RAMIPRIL 5 MG/1
5 CAPSULE ORAL DAILY
Qty: 90 CAPSULE | Refills: 1 | Status: SHIPPED | OUTPATIENT
Start: 2023-01-09

## 2023-01-11 ENCOUNTER — APPOINTMENT (OUTPATIENT)
Dept: LAB | Facility: HOSPITAL | Age: 78
End: 2023-01-11
Payer: MEDICARE

## 2023-01-11 ENCOUNTER — LAB (OUTPATIENT)
Dept: LAB | Facility: HOSPITAL | Age: 78
End: 2023-01-11
Payer: MEDICARE

## 2023-01-11 DIAGNOSIS — D50.9 IRON DEFICIENCY ANEMIA, UNSPECIFIED IRON DEFICIENCY ANEMIA TYPE: ICD-10-CM

## 2023-01-11 LAB
BASOPHILS # BLD AUTO: 0.06 10*3/MM3 (ref 0–0.2)
BASOPHILS NFR BLD AUTO: 0.6 % (ref 0–1.5)
DEPRECATED RDW RBC AUTO: 56.8 FL (ref 37–54)
EOSINOPHIL # BLD AUTO: 0.25 10*3/MM3 (ref 0–0.4)
EOSINOPHIL NFR BLD AUTO: 2.3 % (ref 0.3–6.2)
ERYTHROCYTE [DISTWIDTH] IN BLOOD BY AUTOMATED COUNT: 19.4 % (ref 12.3–15.4)
FERRITIN SERPL-MCNC: 26.5 NG/ML (ref 30–400)
HCT VFR BLD AUTO: 55.4 % (ref 37.5–51)
HGB BLD-MCNC: 17.2 G/DL (ref 13–17.7)
IMM GRANULOCYTES # BLD AUTO: 0.09 10*3/MM3 (ref 0–0.05)
IMM GRANULOCYTES NFR BLD AUTO: 0.8 % (ref 0–0.5)
IRON 24H UR-MRATE: 38 MCG/DL (ref 59–158)
IRON SATN MFR SERPL: 8 % (ref 20–50)
LYMPHOCYTES # BLD AUTO: 1.76 10*3/MM3 (ref 0.7–3.1)
LYMPHOCYTES NFR BLD AUTO: 16.5 % (ref 19.6–45.3)
MCH RBC QN AUTO: 26 PG (ref 26.6–33)
MCHC RBC AUTO-ENTMCNC: 31 G/DL (ref 31.5–35.7)
MCV RBC AUTO: 83.8 FL (ref 79–97)
MONOCYTES # BLD AUTO: 1.32 10*3/MM3 (ref 0.1–0.9)
MONOCYTES NFR BLD AUTO: 12.4 % (ref 5–12)
NEUTROPHILS NFR BLD AUTO: 67.4 % (ref 42.7–76)
NEUTROPHILS NFR BLD AUTO: 7.17 10*3/MM3 (ref 1.7–7)
NRBC BLD AUTO-RTO: 0 /100 WBC (ref 0–0.2)
PLATELET # BLD AUTO: 470 10*3/MM3 (ref 140–450)
PMV BLD AUTO: 9.9 FL (ref 6–12)
RBC # BLD AUTO: 6.61 10*6/MM3 (ref 4.14–5.8)
TIBC SERPL-MCNC: 451 MCG/DL (ref 298–536)
TRANSFERRIN SERPL-MCNC: 303 MG/DL (ref 200–360)
WBC NRBC COR # BLD: 10.65 10*3/MM3 (ref 3.4–10.8)

## 2023-01-11 PROCEDURE — 36415 COLL VENOUS BLD VENIPUNCTURE: CPT

## 2023-01-11 PROCEDURE — 85025 COMPLETE CBC W/AUTO DIFF WBC: CPT

## 2023-01-11 PROCEDURE — 84466 ASSAY OF TRANSFERRIN: CPT | Performed by: INTERNAL MEDICINE

## 2023-01-11 PROCEDURE — 83540 ASSAY OF IRON: CPT | Performed by: INTERNAL MEDICINE

## 2023-01-11 PROCEDURE — 82728 ASSAY OF FERRITIN: CPT | Performed by: INTERNAL MEDICINE

## 2023-01-12 ENCOUNTER — OFFICE VISIT (OUTPATIENT)
Dept: CARDIOLOGY | Facility: CLINIC | Age: 78
End: 2023-01-12
Payer: MEDICARE

## 2023-01-12 VITALS
DIASTOLIC BLOOD PRESSURE: 78 MMHG | BODY MASS INDEX: 26.37 KG/M2 | HEART RATE: 58 BPM | WEIGHT: 174 LBS | HEIGHT: 68 IN | SYSTOLIC BLOOD PRESSURE: 140 MMHG | OXYGEN SATURATION: 98 %

## 2023-01-12 DIAGNOSIS — I63.412 CEREBROVASCULAR ACCIDENT (CVA) DUE TO EMBOLISM OF LEFT MIDDLE CEREBRAL ARTERY: ICD-10-CM

## 2023-01-12 DIAGNOSIS — Z86.79 STATUS POST ABLATION OF ATRIAL FLUTTER: ICD-10-CM

## 2023-01-12 DIAGNOSIS — Z98.890 STATUS POST ABLATION OF ATRIAL FLUTTER: ICD-10-CM

## 2023-01-12 DIAGNOSIS — I25.810 CORONARY ARTERY DISEASE INVOLVING CORONARY BYPASS GRAFT OF NATIVE HEART WITHOUT ANGINA PECTORIS: Primary | ICD-10-CM

## 2023-01-12 DIAGNOSIS — Z86.79 HISTORY OF ATRIAL FLUTTER: ICD-10-CM

## 2023-01-12 DIAGNOSIS — E78.5 HYPERLIPIDEMIA, UNSPECIFIED HYPERLIPIDEMIA TYPE: ICD-10-CM

## 2023-01-12 PROCEDURE — 99214 OFFICE O/P EST MOD 30 MIN: CPT | Performed by: NURSE PRACTITIONER

## 2023-01-12 RX ORDER — AMLODIPINE BESYLATE 2.5 MG/1
2.5 TABLET ORAL DAILY
Qty: 90 TABLET | Refills: 3 | Status: SHIPPED | OUTPATIENT
Start: 2023-01-12

## 2023-01-12 NOTE — PROGRESS NOTES
"    CARDIOLOGY        Patient Name: Madhu Santoro  :1945  Age: 77 y.o.  Primary Cardiologist: Jann Wright MD  Encounter Provider:  MY Tapia    Date of Service: 23            CHIEF COMPLAINT / REASON FOR OFFICE VISIT     Coronary Artery Disease (6 month f/u)      HISTORY OF PRESENT ILLNESS       HPI  Madhu Santoro is a 77 y.o. male who presents today for semiannual follow-up.     Pt has a  history significant for CAD with prior CABG, hyperlipidemia, history of a flutter, hypertension, stroke.    Patient reports that he has done well since last assessment.  He does admit that he suffered a fall approximately a month ago and has some residual right-sided pain near his ribs.  He has been evaluated by primary care for this complaint and there is suspicion of a rib fracture.  Patient does report that every few weeks he has episodes of heart palpitations that primarily occur at night.  These are not sustained and not bothersome to the patient.  He denies any chest discomfort, angina, dyspnea at rest or with exertion, edema, worsening fatigue.  He does exercise in the form of walking.  He also notes that he is more active in the warmer months where he can work outside in his yard.      The following portions of the patient's history were reviewed and updated as appropriate: allergies, current medications, past family history, past medical history, past social history, past surgical history and problem list.      VITAL SIGNS     Visit Vitals  /78 (BP Location: Right arm, Patient Position: Sitting)   Pulse 58   Ht 172.7 cm (68\")   Wt 78.9 kg (174 lb)   SpO2 98%   BMI 26.46 kg/m²         Wt Readings from Last 3 Encounters:   23 78.9 kg (174 lb)   23 78 kg (172 lb)   23 78.9 kg (174 lb)     Body mass index is 26.46 kg/m².      REVIEW OF SYSTEMS   Review of Systems   Constitutional: Negative for chills, fever, weight gain and weight loss.   Cardiovascular: Positive " for palpitations. Negative for leg swelling.   Respiratory: Negative for cough, snoring and wheezing.    Hematologic/Lymphatic: Negative for bleeding problem. Does not bruise/bleed easily.   Skin: Negative for color change.   Musculoskeletal: Negative for falls, joint pain and myalgias.   Gastrointestinal: Negative for melena.   Genitourinary: Negative for hematuria.   Neurological: Positive for light-headedness. Negative for excessive daytime sleepiness.   Psychiatric/Behavioral: Negative for depression. The patient is not nervous/anxious.            PHYSICAL EXAMINATION     Constitutional:       Appearance: Normal appearance. Well-developed.   Eyes:      Conjunctiva/sclera: Conjunctivae normal.   Neck:      Vascular: No carotid bruit.   Pulmonary:      Effort: Pulmonary effort is normal.      Breath sounds: Normal breath sounds.   Cardiovascular:      Normal rate. Regular rhythm. Normal S1. Normal S2.      Murmurs: There is no murmur.      No gallop. No click. No rub.   Musculoskeletal: Normal range of motion. Skin:     General: Skin is warm and dry.   Neurological:      Mental Status: Alert and oriented to person, place, and time.      GCS: GCS eye subscore is 4. GCS verbal subscore is 5. GCS motor subscore is 6.   Psychiatric:         Speech: Speech normal.         Behavior: Behavior normal.         Thought Content: Thought content normal.         Judgment: Judgment normal.           REVIEWED DATA     Procedures    Cardiac Procedures:  1. Echocardiogram on 4/8/2017: The ejection fraction was 55-60%.  The left atrium was mildly to moderately dilated.  There was mild mitral regurgitation.  2. Left heart catheterization on 4/10/2017: There was a distal 80% and severely calcified stenosis in the left main extending into the ostium of left circumflex.  The left circumflex was dominant, and did have the before mentioned 80% lesion in the ostial aspect extending from the left main.  The LAD had 20% proximal disease.   The right coronary artery was small, nondominant, and normal.  3. Status post 3 vessel coronary artery bypass grafting on 4/11/2017 by Dr. Cortez: ROSARIO to proximal LAD, SVG to OM 1, and SVG to OM 3.  4. Transesophageal echo on 4/17/2017: The ejection fraction was 55%.  There was moderate left atrial enlargement and mild to moderate right atrial enlargement.  The atrial septum was redundant, but no PFO was seen on the saline study.  There was no significant valvular disease.  5. Status post cavotricuspid isthmus ablation for typical atrial flutter on 4/18/2017 by Dr. Gustafson.  6. Carotid Doppler ultrasound on 4/10/2017: There was 50-59% stenosis in the right internal carotid artery.  There was 1-15% disease in the left internal carotid artery.  7. Echocardiogram on 10/24/2017: Ejection fraction was 56%.  There was grade 2 diastolic dysfunction.  There was aortic sclerosis without stenosis.  8. Echocardiogram 4/10/2021.  LVEF 57%.  Diastolic function normal.  RV cavity is mildly dilated.  Mild calcification of the mitral valve.  Trace mitral valve regurgitation.  No stenosis.  No aortic valve regurgitation or stenosis.      Lipid Panel    Lipid Panel 2/8/22 8/11/22 1/6/23   Total Cholesterol 128 132 119   Triglycerides 75 66 96   HDL Cholesterol   38 (A)   VLDL Cholesterol   18   LDL Cholesterol    63   (A) Abnormal value       Comments are available for some flowsheets but are not being displayed.           BUN   Date Value Ref Range Status   01/06/2023 10 8 - 23 mg/dL Final   08/24/2022 15 8 - 23 mg/dL Final     Creatinine   Date Value Ref Range Status   01/06/2023 1.02 0.76 - 1.27 mg/dL Final   08/24/2022 0.92 0.76 - 1.27 mg/dL Final   08/09/2022 1.10 0.60 - 1.30 mg/dL Final     Comment:     Serial Number: [id number]:  [id number]     Potassium   Date Value Ref Range Status   01/06/2023 5.0 3.5 - 5.2 mmol/L Final   08/24/2022 4.4 3.5 - 5.2 mmol/L Final     Comment:     Slight hemolysis detected by analyzer.  Results may be affected.     ALT (SGPT)   Date Value Ref Range Status   01/06/2023 11 1 - 41 U/L Final   08/22/2022 11 1 - 41 U/L Final     AST (SGOT)   Date Value Ref Range Status   01/06/2023 24 1 - 40 U/L Final   08/22/2022 22 1 - 40 U/L Final           ASSESSMENT & PLAN     Diagnoses and all orders for this visit:    1. Coronary artery disease involving coronary bypass graft of native heart without angina pectoris (Primary)  · Denies angina or dyspnea  · No ischemic changes on ECG  · Continue amlodipine, rosuvastatin    2. History of atrial flutter  3. Status post ablation of atrial flutter  · Sinus rhythm on ECG  · Continue apixaban    4. 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 Hyperlipidemia, unspecified hyperlipidemia type  · Continue rosuvastatin    Other orders  -     amLODIPine (NORVASC) 2.5 MG tablet; Take 1 tablet by mouth Daily.  Dispense: 90 tablet; Refill: 3  -     apixaban (ELIQUIS) 5 MG tablet tablet; Take 1 tablet by mouth 2 (Two) Times a Day.  Dispense: 90 tablet; Refill: 3          Return in about 1 year (around 1/12/2024) for Dr. Zac Rubalcava.    Future Appointments       Provider Department Center    1/18/2023 1:30 PM VITALS ONLY CBC MAGY [facility] ONCOLOGY CBC LAB [facility]    1/18/2023 1:40 PM Aquiles Lopez MD [facility] HEMATOLOGY & ONCOLOGY [facility]    1/18/2023 2:00 PM CHAIR 7A CBC MAG[facility]-MD [facility] INFUSION CBC [facility]    2/6/2023 11:00 AM Marisol Perales APRN [facility] PAIN MANAGEMENT [place]    2/17/2023 1:45 PM [place] MRI 2 [facility] MRI [place]    7/3/2023 8:30 AM [facility] 2 [facility] PRIMARY CARE [place]    7/10/2023 9:00 AM Damian Sanchez MD [facility] PRIMARY CARE [place]    1/16/2024 11:20 AM Massimo Jordan MD [facility] CARDIOLOGY [place]                 MEDICATIONS         Discharge Medications          Accurate as of January 12, 2023  1:33 PM. If you have any questions, ask your nurse or doctor.            Changes to Medications      Instructions Start Date   apixaban 5 MG tablet tablet  Commonly known as: ELIQUIS  What changed: additional instructions  Changed by: MY Tapia   5 mg, Oral, 2 Times Daily         Continue These Medications      Instructions Start Date   amLODIPine 2.5 MG tablet  Commonly known as: NORVASC   2.5 mg, Oral, Daily      HYDROcodone-acetaminophen  MG per tablet  Commonly known as: NORCO   1 tablet, Oral, Every 8 Hours PRN      mupirocin 2 % cream  Commonly known as: BACTROBAN   1 application, Topical, 2 Times Daily      Myrbetriq 25 MG tablet sustained-release 24 hour 24 hr tablet  Generic drug: Mirabegron ER   25 mg, Oral, Daily      pantoprazole 40 MG EC tablet  Commonly known as: PROTONIX   40 mg, Oral, Daily      ramipril 5 MG capsule  Commonly known as: ALTACE   5 mg, Oral, Daily      rosuvastatin 20 MG tablet  Commonly known as: Crestor   20 mg, Oral, Daily         Stop These Medications    ferrous gluconate 324 MG tablet  Commonly known as: FERGON  Stopped by: MY Tapia     nitroglycerin 0.4 MG SL tablet  Commonly known as: NITROSTAT  Stopped by: MY Tapia                **Dragon Disclaimer:   Much of this encounter note is an electronic transcription/translation of spoken language to printed text. The electronic translation of spoken language may permit erroneous, or at times, nonsensical words or phrases to be inadvertently transcribed. Although I have reviewed the note for such errors, some may still exist.

## 2023-01-17 NOTE — PROGRESS NOTES
Subjective Patient noting chronic back pain, unchanged performance status with continued weakness, recent nephrolithiasis    REASON FOR FOLLOW-UP: Thrombocytosis secondary to iron deficiency anemia      History of present illness     The patient is a 77-year-old male followed with a number of issues including coronary artery disease, status post CABG, atrial flutter, previous CVA hyperlipidemia, GE reflux, carotid vascular disease, and hypertension.     He had been seen by vascular 2/25/2022 with mild progression of carotid disease but otherwise stable and also had follow-up with pain management for back pain 3/28/2022 requiring chronic narcotic therapy.  Patient has been resistant to epidural like procedures.     Unfortunately has had concurrent constipation requiring laxatives and additional opioid antagonist to reduce GI hypomotility.  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     Patient recently reviewed again by pain management with worsening pain.  Patient also saw GI periodically undergoing a follow-up MRI of the abdomen 8/9/2022 with scattered pancreatic cystic lesions unchanged from previous.  He had previously undergone EGD and colonoscopy 11/5/2021 with irregular Z-line and biopsies taken in nonbleeding internal hemorrhoids found during retroflexion that were small.  There are scattered small and large mouth diverticula found in the sigmoid colon descending colon and splenic flexure.       The patient now presents for thrombocytosis with review of his record over the last year demonstrating a platelet count that is escalated from 3-400,000 now to 8/11/2022 648,000 but concurrent microcytic and hypochromic indices.  These indices have been slowly reducing over the last 2 years approximately followed more closely.        These findings are  discussed with the patient 8/16/2022 who indicates that he actually feels about the same though still has issues with back pain.  He is noted no change in bowel habit including, fortunately, improvement of his constipation without the use of additional medication such as Movantik.  He has not noted any change in the color of his stool including dark stools or any blood per rectum, urine though he does note periodic excess bruising from his anticoagulation therapy.       The patient moved to a trial of iron which, unfortunately, worsened his constipation in which he had discontinue. He had further issues in early September with left renal stone, requiring cystoscopy, stent placement 8/23/2022.       He is next seen back 9/28/2022 with repeat studies performed 9/21 demonstrating 5% iron saturation, ferritin of 12.5.  He remains further weight and fatigue, again oral iron intolerant and will require IV iron preparations for his iron deficiency anemia.      The patient is next seen 1/18/2023 with considerable improvement in his testing including H&H now 17.2 and 55.4 though with iron saturation of 8% and ferritin 26.5.  He has not noted any blood loss but remains generally weak and with ongoing periodic abdominal pain.  His major concerns continue to be a disequilibrium since his previous CVA symptoms.  He has not been seen in follow-up by neurology.  He continues to have hematuria by urinary assessment but not gross findings.  We have discussed that he may actually have a myeloproliferative disorder and requested that he undergo an assessment for JAK2 analysis today.    Past Medical History:   Diagnosis Date   • Anxiety    • Arthritis    • Atrial flutter (HCC)     Status post cavotricuspid isthmus ablation by Dr. Gustafson on 4/18/17   • Mandel esophagus    • Benign essential hypertension    • CAD (coronary artery disease)     3 vessel CABG 4/11/17 by Dr. Cortez: ROSARIO-prox LAD, SVG-OM1, SVG-OM3   • Carotid artery  "disease (HCC)     Status post carotid endarterectomy - USG 4/10/17: 50-59% NICHELLE, 1-15% LICA.    • Colonic polyp    • Cyst of pancreas    • DDD (degenerative disc disease), lumbosacral    • GERD (gastroesophageal reflux disease)    • H/O bone density study 2013   • H/O complete eye exam 2014   • History of kidney stone    • HLD (hyperlipidemia)    • Hypertension    • Kidney stone     8/22/22   • Lipid screening 05/31/2013   • Low back pain     [facility]   • Screening for prostate cancer 07/07/2015   • Skin cancer     nose   • Stroke (HCC)     RESIDUAL--\"BALANCE ISSUES\"        Past Surgical History:   Procedure Laterality Date   • CARDIAC CATHETERIZATION N/A 04/10/2017    Procedure: Left Heart Cath;  Surgeon: Marjorie Healy MD;  Location: [facility] CATH INVASIVE LOCATION;  Service:    • CARDIAC CATHETERIZATION N/A 04/10/2017    Procedure: Coronary angiography;  Surgeon: Marjorie Healy MD;  Location: [facility] CATH INVASIVE LOCATION;  Service:    • CARDIAC CATHETERIZATION N/A 04/10/2017    Procedure: Left ventriculography;  Surgeon: Marjorie Healy MD;  Location: [facility] CATH INVASIVE LOCATION;  Service:    • CARDIAC CATHETERIZATION  2011   • CARDIAC ELECTROPHYSIOLOGY PROCEDURE N/A 04/18/2017    Procedure: Ablation atrial flutter;  Surgeon: Jose Antonio Gustafson MD;  Location: [facility] CATH INVASIVE LOCATION;  Service:    • CAROTID ENDARTERECTOMY     • CHOLECYSTECTOMY     • CHOLECYSTECTOMY WITH INTRAOPERATIVE CHOLANGIOGRAM N/A 09/07/2019    Procedure: Laparoscopic cholecystectomy with intraoperative cholangiogram;  Surgeon: Gauri Travis MD;  Location: [facility] MAIN OR;  Service: General   • COLONOSCOPY  01/06/2015    Diverticulosis, one TA   • COLONOSCOPY N/A 02/14/2019    tics, NBIH, adenomatous polyp x 2   • COLONOSCOPY N/A 11/05/2021    Procedure: COLONOSCOPY TO CECUM AND TERM. ILEUM WITH COLD POLYPECTOMIES;  Surgeon: Everton Abel MD;  Location: [facility] ENDOSCOPY;  Service: Gastroenterology;  " Laterality: N/A;  PRE OP - PERS H/O POLYPS  POST OP - COLON POLYPS,, DIVERTICULOSIS, HEMORRHOIDS   • CORONARY ARTERY BYPASS GRAFT N/A 04/11/2017    Procedure: AR STERNOTOMY CORONARY ARTERY BYPASS GRAFT TIMES 3 USING LEFT INTERNAL MAMMARY ARTERY AND LEFT GREATER SAPHENOUS VEIN GRAFT PER ENDOSCOPIC VEIN HARVESTING AND PRP ;  Surgeon: Temo Cortez MD;  Location: [facility] MAIN OR;  Service:    • ENDOSCOPY  01/06/2015    HH, Ervin's esophagus   • ENDOSCOPY N/A 02/14/2019    Z line irregular, HH, Ervin's esophagus   • ENDOSCOPY N/A 11/05/2021    Procedure: ESOPHAGOGASTRODUODENOSCOPY WITH BIOPSIES;  Surgeon: Everton Abel MD;  Location: [facility] ENDOSCOPY;  Service: Gastroenterology;  Laterality: N/A;  PRE OP - PERS H/O ERVIN'S  POST OP - IRREG Z LINE   • KNEE SURGERY Left    • URETEROSCOPY LASER LITHOTRIPSY WITH STENT INSERTION Left 8/23/2022    Procedure: Cysto retrograde with left uretro stent placement;  Surgeon: Damien Oliveira MD;  Location: [facility] MAIN OR;  Service: Urology;  Laterality: Left;   • VASECTOMY          Current Outpatient Medications on File Prior to Visit   Medication Sig Dispense Refill   • amLODIPine (NORVASC) 2.5 MG tablet Take 1 tablet by mouth Daily. 90 tablet 3   • apixaban (ELIQUIS) 5 MG tablet tablet Take 1 tablet by mouth 2 (Two) Times a Day. 90 tablet 3   • HYDROcodone-acetaminophen (NORCO)  MG per tablet Take 1 tablet by mouth Every 8 (Eight) Hours As Needed for Severe Pain. 75 tablet 0   • mupirocin (BACTROBAN) 2 % cream Apply 1 application topically to the appropriate area as directed 2 (Two) Times a Day. 15 g 0   • Myrbetriq 25 MG tablet sustained-release 24 hour 24 hr tablet Take 25 mg by mouth Daily.     • pantoprazole (PROTONIX) 40 MG EC tablet Take 1 tablet by mouth Daily. 90 tablet 1   • ramipril (ALTACE) 5 MG capsule Take 1 capsule by mouth Daily. 90 capsule 1   • rosuvastatin (Crestor) 20 MG tablet Take 1 tablet by mouth Daily. 90 tablet 1     No current  facility-administered medications on file prior to visit.        ALLERGIES:    Allergies   Allergen Reactions   • Atorvastatin Myalgia     Myalgia   • Penicillins Hives, Swelling and Rash        Social History     Socioeconomic History   • Marital status:      Spouse name: Brooke   • Years of education: 9th grade   Tobacco Use   • Smoking status: Former     Packs/day: 1.00     Types: Cigarettes     Start date: 1959     Quit date: 2009     Years since quittin.0   • Smokeless tobacco: Never   • Tobacco comments:     CAFFEINE USE   Vaping Use   • Vaping Use: Never used   Substance and Sexual Activity   • Alcohol use: Not Currently     Comment: rarely   • Drug use: No   • Sexual activity: Defer     Partners: Female        Family History   Problem Relation Age of Onset   • Hypertension Mother    • Heart disease Mother    • Heart attack Mother    • Stroke Mother    • Heart disease Father    • Heart attack Father    • Stroke Father    • Hypertension Sister    • Heart attack Brother    • Heart disease Brother    • No Known Problems Brother    • Heart disease Brother    • Heart attack Brother    • Diabetes Brother    • No Known Problems Maternal Grandmother    • No Known Problems Maternal Grandfather    • No Known Problems Paternal Grandmother    • No Known Problems Paternal Grandfather    • Pancreatic cancer Paternal Cousin    • Arthritis Other    • Diabetes Other    • Hypertension Other    • Kidney disease Other         stones   • Malig Hyperthermia Neg Hx         Review of Systems   Constitutional: Positive for fatigue. Negative for activity change, appetite change and unexpected weight change.   HENT: Positive for dental problem (Edentulous, mouth irritation from dentures).    Eyes: Negative.    Respiratory: Negative.    Cardiovascular: Negative.    Gastrointestinal: Negative.    Endocrine: Negative.    Genitourinary: Positive for flank pain.   Musculoskeletal: Positive for arthralgias and back pain.  "  Skin: Positive for pallor.   Neurological: Positive for dizziness (Unstable gait and ambulation after previous CVA) and weakness.        Objective     Vitals:    01/18/23 1334   BP: 176/68   Pulse: 53   Resp: 16   Temp: 97.1 °F (36.2 °C)   TempSrc: Temporal   SpO2: 97%   Weight: 79.6 kg (175 lb 8 oz)   Height: 172.7 cm (67.99\")   PainSc:   6   PainLoc: Generalized     Current Status 1/18/2023   ECOG score 0       Physical Exam  Constitutional:       Appearance: Normal appearance.   HENT:      Head: Normocephalic and atraumatic.      Nose: Nose normal.      Mouth/Throat:      Mouth: Mucous membranes are moist.      Pharynx: Oropharynx is clear.      Comments: Edentulous, generalized gum irritation noted  Eyes:      Conjunctiva/sclera: Conjunctivae normal.      Pupils: Pupils are equal, round, and reactive to light.   Cardiovascular:      Rate and Rhythm: Normal rate and regular rhythm.      Pulses: Normal pulses.      Heart sounds: Normal heart sounds.   Pulmonary:      Effort: Pulmonary effort is normal.      Breath sounds: Normal breath sounds.   Abdominal:      General: Bowel sounds are normal.      Palpations: Abdomen is soft.   Musculoskeletal:         General: Normal range of motion.   Skin:     General: Skin is dry.   Neurological:      General: No focal deficit present.      Mental Status: He is alert.      Motor: Weakness present.      Coordination: Coordination abnormal.      Gait: Gait abnormal.   Psychiatric:         Mood and Affect: Mood normal.          RECENT LABS:  Hematology WBC   Date Value Ref Range Status   01/11/2023 10.65 3.40 - 10.80 10*3/mm3 Final   01/06/2023 8.83 3.40 - 10.80 10*3/mm3 Final     RBC   Date Value Ref Range Status   01/11/2023 6.61 (H) 4.14 - 5.80 10*6/mm3 Final   01/06/2023 6.85 (H) 4.14 - 5.80 10*6/mm3 Final     Hemoglobin   Date Value Ref Range Status   01/11/2023 17.2 13.0 - 17.7 g/dL Final     Hematocrit   Date Value Ref Range Status   01/11/2023 55.4 (H) 37.5 - 51.0 % " Final     Platelets   Date Value Ref Range Status   01/11/2023 470 (H) 140 - 450 10*3/mm3 Final          Assessment & Plan      77-year-old male with medical issues including coronary artery disease, status post CABG, atrial flutter status post ablation, previous CVA, hyperlipidemia, GE reflux, cardiovascular disease and hypertension.  He also has chronic back pain followed by pain management requiring chronic narcotic therapy.  His medical issues per tickly cardiovascular and CVA led to eventual placement on aspirin and Eliquis chronically.  He has additionally been seen by GI including review of scattered cystic lesions in the pancreas and EGD colonoscopy as recently as November 2021.      The patient now presents for thrombocytosis with review of his record over the last year demonstrating a platelet count that is escalated from 3-400,000 now to 8/11/2022 648,000 but concurrent microcytic and hypochromic indices.  These indices have been slowly reducing over the last 2 years approximately followed more closely.  Concurrently is a mild drop in his baseline hemoglobin still well within normal limits.    These issues are discussed with the patient in some detail 8/16/2022 with this thrombocytosis thought, initially, to be related to iron deficiency.  This issupported during his visit with ferritin found to be 16.3 and iron of 26 with 5% saturation and TIBC 511.  We should first moved to replace his iron stores with his platelet count likely to reflect this reconstitution.  If he has continued evidence of iron deficiency despite this or is intolerant to oral iron (noting his history with constipation) then we would need to move to intravenous iron therapy and possibly further GI assessment with capsule endoscopy.  The patient was placed on ferrous gluconate which, unfortunately, he was unable to tolerate with worsening constipation.      He had further issues in early September with left renal stone, requiring  cystoscopy, stent placement 8/23/2022.    He is next seen back 9/28/2022 with repeat studies performed 9/21 demonstrating 5% iron saturation, ferritin of 12.5.  He remains further weight and fatigue, again oral iron intolerant and will require IV iron preparations for his iron deficiency anemia.    We discontinued oral iron and proceeded with Injectafer given 9/28/2022 in 10/5/2022     He is reassessed 1/18/2023 with follow-up CBC including H&H of 17.2 and 55.4, white count of 10,650 and platelet count of 470,000, iron of 38 with a percent saturation and ferritin of 26.5.     He has not noted any blood loss but remains generally weak and with ongoing periodic abdominal pain.  He continues to have hematuria by urinary assessment but not gross findings.  He also to continues to have periodic disequilibrium and unsteadiness and we will request neurologic assessment.  We have discussed that he may actually have a myeloproliferative disorder and requested that he undergo an assessment for JAK2 analysis today.    Plan:  *Return to laboratory for JAK2 V617F mutation assessment    *Neurology referral and follow-up    *11 weeks CBC, ferritin, iron profile    *12 weeks possible Injectafer

## 2023-01-18 ENCOUNTER — OFFICE VISIT (OUTPATIENT)
Dept: ONCOLOGY | Facility: CLINIC | Age: 78
End: 2023-01-18
Payer: MEDICARE

## 2023-01-18 ENCOUNTER — APPOINTMENT (OUTPATIENT)
Dept: LAB | Facility: HOSPITAL | Age: 78
End: 2023-01-18
Payer: MEDICARE

## 2023-01-18 ENCOUNTER — APPOINTMENT (OUTPATIENT)
Dept: ONCOLOGY | Facility: HOSPITAL | Age: 78
End: 2023-01-18
Payer: MEDICARE

## 2023-01-18 VITALS
WEIGHT: 175.5 LBS | DIASTOLIC BLOOD PRESSURE: 68 MMHG | BODY MASS INDEX: 26.6 KG/M2 | HEART RATE: 53 BPM | SYSTOLIC BLOOD PRESSURE: 176 MMHG | OXYGEN SATURATION: 97 % | TEMPERATURE: 97.1 F | RESPIRATION RATE: 16 BRPM | HEIGHT: 68 IN

## 2023-01-18 DIAGNOSIS — I63.412 CEREBROVASCULAR ACCIDENT (CVA) DUE TO EMBOLISM OF LEFT MIDDLE CEREBRAL ARTERY: ICD-10-CM

## 2023-01-18 DIAGNOSIS — D50.9 IRON DEFICIENCY ANEMIA, UNSPECIFIED IRON DEFICIENCY ANEMIA TYPE: ICD-10-CM

## 2023-01-18 DIAGNOSIS — D75.1 POLYCYTHEMIA: Primary | ICD-10-CM

## 2023-01-18 DIAGNOSIS — Z79.01 CHRONIC ANTICOAGULATION: ICD-10-CM

## 2023-01-18 PROCEDURE — 99214 OFFICE O/P EST MOD 30 MIN: CPT | Performed by: INTERNAL MEDICINE

## 2023-01-18 PROCEDURE — 36415 COLL VENOUS BLD VENIPUNCTURE: CPT | Performed by: INTERNAL MEDICINE

## 2023-01-20 ENCOUNTER — TELEPHONE (OUTPATIENT)
Dept: CARDIOLOGY | Facility: CLINIC | Age: 78
End: 2023-01-20
Payer: MEDICARE

## 2023-01-20 NOTE — TELEPHONE ENCOUNTER
Pt has appt on Monday to have a toenail removed and asks if it's ok to stop the Eliquis?    LOV 01/12/23 with Erin Suarez,  Jennifer

## 2023-01-23 ENCOUNTER — TELEPHONE (OUTPATIENT)
Dept: FAMILY MEDICINE CLINIC | Facility: CLINIC | Age: 78
End: 2023-01-23
Payer: MEDICARE

## 2023-01-25 LAB — REF LAB TEST METHOD: NORMAL

## 2023-02-06 ENCOUNTER — OFFICE VISIT (OUTPATIENT)
Dept: PAIN MEDICINE | Facility: CLINIC | Age: 78
End: 2023-02-06
Payer: MEDICARE

## 2023-02-06 VITALS
HEIGHT: 68 IN | WEIGHT: 174.4 LBS | DIASTOLIC BLOOD PRESSURE: 76 MMHG | TEMPERATURE: 98.6 F | OXYGEN SATURATION: 97 % | HEART RATE: 67 BPM | SYSTOLIC BLOOD PRESSURE: 142 MMHG | BODY MASS INDEX: 26.43 KG/M2

## 2023-02-06 DIAGNOSIS — M47.816 LUMBAR FACET ARTHROPATHY: ICD-10-CM

## 2023-02-06 DIAGNOSIS — M25.552 CHRONIC PAIN OF BOTH HIPS: ICD-10-CM

## 2023-02-06 DIAGNOSIS — M54.50 CHRONIC BILATERAL LOW BACK PAIN WITHOUT SCIATICA: Primary | ICD-10-CM

## 2023-02-06 DIAGNOSIS — M25.551 CHRONIC PAIN OF BOTH HIPS: ICD-10-CM

## 2023-02-06 DIAGNOSIS — M25.50 ARTHRALGIA OF MULTIPLE JOINTS: ICD-10-CM

## 2023-02-06 DIAGNOSIS — G89.29 CHRONIC BILATERAL LOW BACK PAIN WITHOUT SCIATICA: Primary | ICD-10-CM

## 2023-02-06 DIAGNOSIS — G89.29 CHRONIC PAIN OF BOTH HIPS: ICD-10-CM

## 2023-02-06 DIAGNOSIS — Z79.899 ENCOUNTER FOR LONG-TERM (CURRENT) USE OF HIGH-RISK MEDICATION: ICD-10-CM

## 2023-02-06 PROCEDURE — 80305 DRUG TEST PRSMV DIR OPT OBS: CPT

## 2023-02-06 PROCEDURE — 99214 OFFICE O/P EST MOD 30 MIN: CPT

## 2023-02-06 RX ORDER — HYDROCODONE BITARTRATE AND ACETAMINOPHEN 10; 325 MG/1; MG/1
1 TABLET ORAL EVERY 8 HOURS PRN
Qty: 75 TABLET | Refills: 0 | Status: SHIPPED | OUTPATIENT
Start: 2023-02-06 | End: 2023-03-02 | Stop reason: SDUPTHER

## 2023-02-06 NOTE — PROGRESS NOTES
"CHIEF COMPLAINT  Back and joint pain    Subjective   Madhu Santoro is a 77 y.o. male  who presents for follow-up.  He has a history of chronic low back. He reports that his pain has slightly improved since his last office visit due to a change in medication regimen during office visit in December.    Today pain is 8/10VAS in severity. Pain is located in his lumbar spine and runs across his belt line. He reports occasional radiating pain to bilateral legs (\"entire leg\"). He reports pain in bilateral hands and numbness to bilateral feet. Describes this pain as a nearly continuous aching and throbbing. Pain is worsened by prolonged walking or standing, increased physical activity, and bending/twisitng. Pain improves with rest/reposition, use of a heating pad, and medication. He reports that he has tried PT in the past but this only caused increased pain.     Continues with Hydrocodone 10mg 2-3/day and OTC Tylenol PRN. Reports occasional constipation that is managed by Miralax and laxatives. Denies any other side effects from the regimen including somnolence. The regimen helps \"take the edge off\". Notes improvement in activity and function with regimen. ADL's by self. Denies any bowel or bladder changes.      Not a candidate for NSAIDs - history of TIA's and remains on Eliquis     Continues to have no interest in interventional procedures due to a friend having developed foot drop following a procedure for back pain.     Reports increased numbness to bilateral lower extremities. He is scheduled to see his PCP, Dr. Sanchez in regards to this tomorrow.    Sees Dr. Lopez for thrombocytosis secondary to iron deficiency anemia. He gets iron infusions as needed.    Back Pain  This is a chronic problem. The current episode started more than 1 year ago. The problem occurs constantly. The problem has been gradually improving since onset. The pain is present in the lumbar spine. The quality of the pain is described as aching " (throbbing). Radiates to: to bilateral legs L > R  The pain is at a severity of 8/10. The symptoms are aggravated by standing, sitting, twisting, bending, lying down and position (walking long distances, weather changes). Stiffness is present all day. Associated symptoms include abdominal pain and numbness. Pertinent negatives include no chest pain, dysuria, fever, headaches or weakness. He has tried heat and analgesics (rest/reposition, PT in the past (this caused increased pain)) for the symptoms. The treatment provided mild relief.   Joint Pain  This is a chronic (bilateral hands, left shoulder, bilateral knees) problem. The current episode started more than 1 year ago. The problem occurs daily. The problem has been waxing and waning. Associated symptoms include abdominal pain, arthralgias, fatigue and numbness. Pertinent negatives include no chest pain, chills, congestion, coughing, fever, headaches, joint swelling or weakness. The symptoms are aggravated by walking, standing and bending (weather changes, increased physical activity, ). He has tried oral narcotics, position changes, rest, heat, lying down and acetaminophen for the symptoms. The treatment provided moderate relief.      PEG Assessment   What number best describes your pain on average in the past week?8  What number best describes how, during the past week, pain has interfered with your enjoyment of life?7  What number best describes how, during the past week, pain has interfered with your general activity?  7    The following portions of the patient's history were reviewed and updated as appropriate: allergies, current medications, past family history, past medical history, past social history, past surgical history and problem list.    Review of Systems   Constitutional: Positive for fatigue. Negative for activity change, chills and fever.   HENT: Negative for congestion.    Eyes: Negative for visual disturbance.   Respiratory: Negative for  "cough, chest tightness and shortness of breath.    Cardiovascular: Negative for chest pain.   Gastrointestinal: Positive for abdominal pain and constipation.   Genitourinary: Negative for difficulty urinating and dysuria.   Musculoskeletal: Positive for arthralgias and back pain. Negative for joint swelling.   Neurological: Positive for numbness. Negative for dizziness, weakness, light-headedness and headaches.   Psychiatric/Behavioral: Positive for sleep disturbance. Negative for agitation. The patient is not nervous/anxious.      I have reviewed and confirmed the accuracy of the ROS as documented by the MA/LPN/RN MY Perez    Vitals:    02/06/23 1051   BP: 142/76   Pulse: 67   Temp: 98.6 °F (37 °C)   SpO2: 97%   Weight: 79.1 kg (174 lb 6.4 oz)   Height: 172.7 cm (68\")   PainSc:   8   PainLoc: Back     Objective   Physical Exam  Constitutional:       Appearance: Normal appearance.   HENT:      Head: Normocephalic.   Cardiovascular:      Rate and Rhythm: Normal rate and regular rhythm.   Pulmonary:      Effort: Pulmonary effort is normal.      Breath sounds: Normal breath sounds.   Musculoskeletal:      Cervical back: Normal range of motion.      Lumbar back: Tenderness present. Decreased range of motion. Positive right straight leg raise test and positive left straight leg raise test.      Comments: Diffuse arthritic changes   Skin:     General: Skin is warm and dry.      Capillary Refill: Capillary refill takes less than 2 seconds.   Neurological:      General: No focal deficit present.      Mental Status: He is alert and oriented to person, place, and time.   Psychiatric:         Mood and Affect: Mood normal.         Behavior: Behavior normal.         Thought Content: Thought content normal.         Cognition and Memory: Cognition normal.       Assessment & Plan   Diagnoses and all orders for this visit:    1. Chronic bilateral low back pain without sciatica (Primary)  -     HYDROcodone-acetaminophen " (NORCO)  MG per tablet; Take 1 tablet by mouth Every 8 (Eight) Hours As Needed for Severe Pain. 30 day supply  Dispense: 75 tablet; Refill: 0    2. Chronic pain of both hips  -     HYDROcodone-acetaminophen (NORCO)  MG per tablet; Take 1 tablet by mouth Every 8 (Eight) Hours As Needed for Severe Pain. 30 day supply  Dispense: 75 tablet; Refill: 0    3. Arthralgia of multiple joints    4. Lumbar facet arthropathy    5. Encounter for long-term (current) use of high-risk medication    --- Routine UDS in office today as part of monitoring requirements for controlled substances.  The specimen was viewed and the immunoassay result reviewed and is +OPI (appropriate). This specimen will be sent to [facility] laboratory for confirmation.     --- The patient signed an updated copy of the controlled substance agreement on 6/6/22  --- Refill Hydrocodone. Patient appears stable with current regimen. No adverse effects. Regarding continuation of opioids, there is no evidence of aberrant behavior or any red flags.  The patient continues with appropriate response to opioid therapy. ADL's remain intact by self.   --- Follow-up 2 months or sooner if needed     RUTH REPORT  As part of the patient's treatment plan, I am prescribing controlled substances. The patient has been made aware of appropriate use of such medications, including potential risk of somnolence, limited ability to drive and/or work safely, and the potential for dependence or overdose. It has also been made clear that these medications are for use by this patient only, without concomitant use of alcohol or other substances unless prescribed.     Patient has completed prescribing agreement detailing terms of continued prescribing of controlled substances, including monitoring RUTH reports, urine drug screening, and pill counts if necessary. The patient is aware that inappropriate use will results in cessation of prescribing such medications.    As the  clinician, I personally reviewed the RUTH from 2/6/22 while the patient was in the office today.    History and physical exam exhibit continued safe and appropriate use of controlled substances.    Dictated utilizing Dragon dictation.     Patient remained masked during entire encounter. No cough present. I donned a mask and eye protection throughout entire visit. Prior to donning mask and eye protection, hand hygiene was performed, as well as when it was doffed.  I was closer than 6 feet, but not for an extended period of time. No obvious exposure to any bodily fluids.

## 2023-02-07 NOTE — PROGRESS NOTES
"Subjective   Madhu Santoro is a 77 y.o. male.     CC: Feet Hurt/Burn    History of Present Illness     Pt comes in today reporting some ongoing issues with bilateral feet pain/burning/tingling for over a month. Pt has a h/o Lumbar DDD and sees Pain Mgmt for that prior.   No change to B/B.  Pt reports long h/o neuropathy (prior) and is getting worse with time.         The following portions of the patient's history were reviewed and updated as appropriate: allergies, current medications, past family history, past medical history, past social history, past surgical history and problem list.    Review of Systems   Constitutional: Negative for activity change, chills and fever.   Respiratory: Negative for cough.    Cardiovascular: Negative for chest pain.   Musculoskeletal: Positive for arthralgias (feet).   Neurological: Positive for numbness.   Psychiatric/Behavioral: Negative for dysphoric mood.       /67   Pulse 62   Temp 97.5 °F (36.4 °C) (Oral)   Resp 14   Ht 172.7 cm (68\")   Wt 78.9 kg (174 lb)   SpO2 97%   BMI 26.46 kg/m²     Objective   Physical Exam  Constitutional:       General: He is not in acute distress.     Appearance: He is well-developed.   Pulmonary:      Effort: Pulmonary effort is normal.   Neurological:      Mental Status: He is alert and oriented to person, place, and time.   Psychiatric:         Behavior: Behavior normal.         Thought Content: Thought content normal.     Recent CBC shows normal MCV.    Assessment & Plan   Diagnoses and all orders for this visit:    1. Neuropathy (Primary)  -     pregabalin (Lyrica) 50 MG capsule; Take 1 capsule by mouth 3 (Three) Times a Day.  Dispense: 90 capsule; Refill: 5    Reviewed with pt how to slowly introduce this medication.            "

## 2023-02-08 ENCOUNTER — OFFICE VISIT (OUTPATIENT)
Dept: FAMILY MEDICINE CLINIC | Facility: CLINIC | Age: 78
End: 2023-02-08
Payer: MEDICARE

## 2023-02-08 VITALS
HEART RATE: 62 BPM | DIASTOLIC BLOOD PRESSURE: 67 MMHG | WEIGHT: 174 LBS | TEMPERATURE: 97.5 F | RESPIRATION RATE: 14 BRPM | OXYGEN SATURATION: 97 % | HEIGHT: 68 IN | SYSTOLIC BLOOD PRESSURE: 151 MMHG | BODY MASS INDEX: 26.37 KG/M2

## 2023-02-08 DIAGNOSIS — G62.9 NEUROPATHY: Primary | ICD-10-CM

## 2023-02-08 PROCEDURE — 99213 OFFICE O/P EST LOW 20 MIN: CPT | Performed by: FAMILY MEDICINE

## 2023-02-08 RX ORDER — PREGABALIN 50 MG/1
50 CAPSULE ORAL 3 TIMES DAILY
Qty: 90 CAPSULE | Refills: 5 | Status: SHIPPED | OUTPATIENT
Start: 2023-02-08 | End: 2023-03-23 | Stop reason: SINTOL

## 2023-02-15 DIAGNOSIS — M25.50 ARTHRALGIA, UNSPECIFIED JOINT: Primary | ICD-10-CM

## 2023-02-17 ENCOUNTER — HOSPITAL ENCOUNTER (OUTPATIENT)
Dept: MRI IMAGING | Facility: HOSPITAL | Age: 78
Discharge: HOME | DRG: 194 | End: 2023-02-17
Admitting: NURSE PRACTITIONER
Payer: MEDICARE

## 2023-02-17 DIAGNOSIS — K86.2 PANCREATIC CYST: ICD-10-CM

## 2023-02-17 LAB
CREAT BLDA-MCNC: 1.2 MG/DL (ref 0.6–1.3)
URATE SERPL-MCNC: 7.8 MG/DL (ref 3.4–7)

## 2023-02-17 PROCEDURE — 0 GADOBENATE DIMEGLUMINE 529 MG/ML SOLUTION: Performed by: NURSE PRACTITIONER

## 2023-02-17 PROCEDURE — 74183 MRI ABD W/O CNTR FLWD CNTR: CPT

## 2023-02-17 PROCEDURE — A9577 INJ MULTIHANCE: HCPCS | Performed by: NURSE PRACTITIONER

## 2023-02-17 PROCEDURE — 82565 ASSAY OF CREATININE: CPT

## 2023-02-17 RX ORDER — ALLOPURINOL 300 MG/1
300 TABLET ORAL DAILY
Qty: 90 TABLET | Refills: 1 | Status: SHIPPED | OUTPATIENT
Start: 2023-02-17

## 2023-02-17 RX ADMIN — GADOBENATE DIMEGLUMINE 16 ML: 529 INJECTION, SOLUTION INTRAVENOUS at 14:22

## 2023-02-19 ENCOUNTER — APPOINTMENT (OUTPATIENT)
Dept: GENERAL RADIOLOGY | Facility: HOSPITAL | Age: 78
DRG: 194 | End: 2023-02-19
Payer: MEDICARE

## 2023-02-19 ENCOUNTER — HOSPITAL ENCOUNTER (INPATIENT)
Facility: HOSPITAL | Age: 78
LOS: 2 days | Discharge: HOME | DRG: 194 | End: 2023-02-21
Attending: EMERGENCY MEDICINE | Admitting: INTERNAL MEDICINE
Payer: MEDICARE

## 2023-02-19 ENCOUNTER — APPOINTMENT (OUTPATIENT)
Dept: CT IMAGING | Facility: HOSPITAL | Age: 78
DRG: 194 | End: 2023-02-19
Payer: MEDICARE

## 2023-02-19 DIAGNOSIS — S51.011A: ICD-10-CM

## 2023-02-19 DIAGNOSIS — Z20.822 CLOSE EXPOSURE TO COVID-19 VIRUS: ICD-10-CM

## 2023-02-19 DIAGNOSIS — R53.1 WEAKNESS: Primary | ICD-10-CM

## 2023-02-19 DIAGNOSIS — D72.829 LEUKOCYTOSIS, UNSPECIFIED TYPE: ICD-10-CM

## 2023-02-19 DIAGNOSIS — J18.9 PNEUMONIA OF RIGHT UPPER LOBE DUE TO INFECTIOUS ORGANISM: ICD-10-CM

## 2023-02-19 DIAGNOSIS — S70.01XA CONTUSION OF RIGHT HIP, INITIAL ENCOUNTER: ICD-10-CM

## 2023-02-19 LAB
ALBUMIN SERPL-MCNC: 3.8 G/DL (ref 3.5–5.2)
ALBUMIN/GLOB SERPL: 1.4 G/DL
ALP SERPL-CCNC: 154 U/L (ref 39–117)
ALT SERPL W P-5'-P-CCNC: 22 U/L (ref 1–41)
AMPHET+METHAMPHET UR QL: NEGATIVE
ANION GAP SERPL CALCULATED.3IONS-SCNC: 7.3 MMOL/L (ref 5–15)
AST SERPL-CCNC: 37 U/L (ref 1–40)
B PARAPERT DNA SPEC QL NAA+PROBE: NOT DETECTED
B PERT DNA SPEC QL NAA+PROBE: NOT DETECTED
BACTERIA UR QL AUTO: NORMAL /HPF
BARBITURATES UR QL SCN: NEGATIVE
BASOPHILS # BLD AUTO: 0.05 10*3/MM3 (ref 0–0.2)
BASOPHILS NFR BLD AUTO: 0.3 % (ref 0–1.5)
BENZODIAZ UR QL SCN: NEGATIVE
BILIRUB SERPL-MCNC: 1 MG/DL (ref 0–1.2)
BILIRUB UR QL STRIP: NEGATIVE
BUN SERPL-MCNC: 12 MG/DL (ref 8–23)
BUN/CREAT SERPL: 11.2 (ref 7–25)
C PNEUM DNA NPH QL NAA+NON-PROBE: NOT DETECTED
CALCIUM SPEC-SCNC: 9.4 MG/DL (ref 8.6–10.5)
CANNABINOIDS SERPL QL: NEGATIVE
CHLORIDE SERPL-SCNC: 105 MMOL/L (ref 98–107)
CK SERPL-CCNC: 37 U/L (ref 20–200)
CLARITY UR: CLEAR
CO2 SERPL-SCNC: 27.7 MMOL/L (ref 22–29)
COCAINE UR QL: NEGATIVE
COLOR UR: YELLOW
CREAT SERPL-MCNC: 1.07 MG/DL (ref 0.76–1.27)
D-LACTATE SERPL-SCNC: 1.5 MMOL/L (ref 0.5–2)
DEPRECATED RDW RBC AUTO: 44.9 FL (ref 37–54)
EGFRCR SERPLBLD CKD-EPI 2021: 71 ML/MIN/1.73
EOSINOPHIL # BLD AUTO: 0.12 10*3/MM3 (ref 0–0.4)
EOSINOPHIL NFR BLD AUTO: 0.7 % (ref 0.3–6.2)
ERYTHROCYTE [DISTWIDTH] IN BLOOD BY AUTOMATED COUNT: 16.6 % (ref 12.3–15.4)
ETHANOL BLD-MCNC: <10 MG/DL (ref 0–10)
ETHANOL UR QL: <0.01 %
FLUAV SUBTYP SPEC NAA+PROBE: NOT DETECTED
FLUBV RNA ISLT QL NAA+PROBE: NOT DETECTED
GLOBULIN UR ELPH-MCNC: 2.8 GM/DL
GLUCOSE SERPL-MCNC: 108 MG/DL (ref 65–99)
GLUCOSE UR STRIP-MCNC: NEGATIVE MG/DL
HADV DNA SPEC NAA+PROBE: NOT DETECTED
HCOV 229E RNA SPEC QL NAA+PROBE: NOT DETECTED
HCOV HKU1 RNA SPEC QL NAA+PROBE: NOT DETECTED
HCOV NL63 RNA SPEC QL NAA+PROBE: NOT DETECTED
HCOV OC43 RNA SPEC QL NAA+PROBE: NOT DETECTED
HCT VFR BLD AUTO: 57.5 % (ref 37.5–51)
HGB BLD-MCNC: 17.3 G/DL (ref 13–17.7)
HGB UR QL STRIP.AUTO: NEGATIVE
HMPV RNA NPH QL NAA+NON-PROBE: NOT DETECTED
HPIV1 RNA ISLT QL NAA+PROBE: NOT DETECTED
HPIV2 RNA SPEC QL NAA+PROBE: NOT DETECTED
HPIV3 RNA NPH QL NAA+PROBE: NOT DETECTED
HPIV4 P GENE NPH QL NAA+PROBE: NOT DETECTED
HYALINE CASTS UR QL AUTO: NORMAL /LPF
IMM GRANULOCYTES # BLD AUTO: 0.15 10*3/MM3 (ref 0–0.05)
IMM GRANULOCYTES NFR BLD AUTO: 0.8 % (ref 0–0.5)
INR PPP: 1.42 (ref 0.9–1.1)
KETONES UR QL STRIP: NEGATIVE
LEUKOCYTE ESTERASE UR QL STRIP.AUTO: NEGATIVE
LYMPHOCYTES # BLD AUTO: 0.75 10*3/MM3 (ref 0.7–3.1)
LYMPHOCYTES NFR BLD AUTO: 4.1 % (ref 19.6–45.3)
M PNEUMO IGG SER IA-ACNC: NOT DETECTED
MAGNESIUM SERPL-MCNC: 2.1 MG/DL (ref 1.6–2.4)
MCH RBC QN AUTO: 24.6 PG (ref 26.6–33)
MCHC RBC AUTO-ENTMCNC: 30.1 G/DL (ref 31.5–35.7)
MCV RBC AUTO: 81.9 FL (ref 79–97)
METHADONE UR QL SCN: NEGATIVE
MONOCYTES # BLD AUTO: 1.91 10*3/MM3 (ref 0.1–0.9)
MONOCYTES NFR BLD AUTO: 10.4 % (ref 5–12)
NEUTROPHILS NFR BLD AUTO: 15.38 10*3/MM3 (ref 1.7–7)
NEUTROPHILS NFR BLD AUTO: 83.7 % (ref 42.7–76)
NITRITE UR QL STRIP: NEGATIVE
NRBC BLD AUTO-RTO: 0 /100 WBC (ref 0–0.2)
OPIATES UR QL: POSITIVE
OXYCODONE UR QL SCN: NEGATIVE
PH UR STRIP.AUTO: 7 [PH] (ref 5–8)
PLATELET # BLD AUTO: 396 10*3/MM3 (ref 140–450)
PMV BLD AUTO: 10.3 FL (ref 6–12)
POTASSIUM SERPL-SCNC: 4.4 MMOL/L (ref 3.5–5.2)
PROCALCITONIN SERPL-MCNC: 0.11 NG/ML (ref 0–0.25)
PROT SERPL-MCNC: 6.6 G/DL (ref 6–8.5)
PROT UR QL STRIP: ABNORMAL
PROTHROMBIN TIME: 17.6 SECONDS (ref 11.7–14.2)
QT INTERVAL: 395 MS
RBC # BLD AUTO: 7.02 10*6/MM3 (ref 4.14–5.8)
RBC # UR STRIP: NORMAL /HPF
REF LAB TEST METHOD: NORMAL
RHINOVIRUS RNA SPEC NAA+PROBE: NOT DETECTED
RSV RNA NPH QL NAA+NON-PROBE: NOT DETECTED
SARS-COV-2 RNA NPH QL NAA+NON-PROBE: NOT DETECTED
SODIUM SERPL-SCNC: 140 MMOL/L (ref 136–145)
SP GR UR STRIP: 1.02 (ref 1–1.03)
SQUAMOUS #/AREA URNS HPF: NORMAL /HPF
T4 FREE SERPL-MCNC: 1.22 NG/DL (ref 0.93–1.7)
TROPONIN T SERPL HS-MCNC: 27 NG/L
TSH SERPL DL<=0.05 MIU/L-ACNC: 1.83 UIU/ML (ref 0.27–4.2)
UROBILINOGEN UR QL STRIP: ABNORMAL
WBC # UR STRIP: NORMAL /HPF
WBC NRBC COR # BLD: 18.36 10*3/MM3 (ref 3.4–10.8)

## 2023-02-19 PROCEDURE — 80307 DRUG TEST PRSMV CHEM ANLYZR: CPT | Performed by: EMERGENCY MEDICINE

## 2023-02-19 PROCEDURE — 81001 URINALYSIS AUTO W/SCOPE: CPT | Performed by: EMERGENCY MEDICINE

## 2023-02-19 PROCEDURE — 73700 CT LOWER EXTREMITY W/O DYE: CPT

## 2023-02-19 PROCEDURE — 25010000002 CEFTRIAXONE PER 250 MG: Performed by: EMERGENCY MEDICINE

## 2023-02-19 PROCEDURE — 36415 COLL VENOUS BLD VENIPUNCTURE: CPT | Performed by: EMERGENCY MEDICINE

## 2023-02-19 PROCEDURE — 93005 ELECTROCARDIOGRAM TRACING: CPT | Performed by: EMERGENCY MEDICINE

## 2023-02-19 PROCEDURE — 93010 ELECTROCARDIOGRAM REPORT: CPT | Performed by: INTERNAL MEDICINE

## 2023-02-19 PROCEDURE — 73070 X-RAY EXAM OF ELBOW: CPT

## 2023-02-19 PROCEDURE — 84145 PROCALCITONIN (PCT): CPT | Performed by: EMERGENCY MEDICINE

## 2023-02-19 PROCEDURE — 85610 PROTHROMBIN TIME: CPT | Performed by: EMERGENCY MEDICINE

## 2023-02-19 PROCEDURE — 80053 COMPREHEN METABOLIC PANEL: CPT | Performed by: EMERGENCY MEDICINE

## 2023-02-19 PROCEDURE — 87040 BLOOD CULTURE FOR BACTERIA: CPT | Performed by: EMERGENCY MEDICINE

## 2023-02-19 PROCEDURE — 85025 COMPLETE CBC W/AUTO DIFF WBC: CPT | Performed by: EMERGENCY MEDICINE

## 2023-02-19 PROCEDURE — 82077 ASSAY SPEC XCP UR&BREATH IA: CPT | Performed by: EMERGENCY MEDICINE

## 2023-02-19 PROCEDURE — 70450 CT HEAD/BRAIN W/O DYE: CPT

## 2023-02-19 PROCEDURE — 99285 EMERGENCY DEPT VISIT HI MDM: CPT

## 2023-02-19 PROCEDURE — 83735 ASSAY OF MAGNESIUM: CPT | Performed by: EMERGENCY MEDICINE

## 2023-02-19 PROCEDURE — 84484 ASSAY OF TROPONIN QUANT: CPT | Performed by: EMERGENCY MEDICINE

## 2023-02-19 PROCEDURE — 84443 ASSAY THYROID STIM HORMONE: CPT | Performed by: EMERGENCY MEDICINE

## 2023-02-19 PROCEDURE — 83605 ASSAY OF LACTIC ACID: CPT | Performed by: EMERGENCY MEDICINE

## 2023-02-19 PROCEDURE — 84439 ASSAY OF FREE THYROXINE: CPT | Performed by: EMERGENCY MEDICINE

## 2023-02-19 PROCEDURE — 82550 ASSAY OF CK (CPK): CPT | Performed by: EMERGENCY MEDICINE

## 2023-02-19 PROCEDURE — 71045 X-RAY EXAM CHEST 1 VIEW: CPT

## 2023-02-19 PROCEDURE — 0202U NFCT DS 22 TRGT SARS-COV-2: CPT | Performed by: EMERGENCY MEDICINE

## 2023-02-19 PROCEDURE — 72131 CT LUMBAR SPINE W/O DYE: CPT

## 2023-02-19 PROCEDURE — 73502 X-RAY EXAM HIP UNI 2-3 VIEWS: CPT

## 2023-02-19 RX ORDER — SODIUM CHLORIDE 9 MG/ML
100 INJECTION, SOLUTION INTRAVENOUS CONTINUOUS
Status: DISCONTINUED | OUTPATIENT
Start: 2023-02-19 | End: 2023-02-21

## 2023-02-19 RX ORDER — ACETAMINOPHEN 650 MG/1
650 SUPPOSITORY RECTAL EVERY 4 HOURS PRN
Status: DISCONTINUED | OUTPATIENT
Start: 2023-02-19 | End: 2023-02-21 | Stop reason: HOSPADM

## 2023-02-19 RX ORDER — AMLODIPINE BESYLATE 5 MG/1
2.5 TABLET ORAL DAILY
Status: DISCONTINUED | OUTPATIENT
Start: 2023-02-19 | End: 2023-02-21 | Stop reason: HOSPADM

## 2023-02-19 RX ORDER — PREGABALIN 50 MG/1
50 CAPSULE ORAL 3 TIMES DAILY
Status: DISCONTINUED | OUTPATIENT
Start: 2023-02-19 | End: 2023-02-21 | Stop reason: HOSPADM

## 2023-02-19 RX ORDER — ROSUVASTATIN CALCIUM 20 MG/1
20 TABLET, COATED ORAL DAILY
Status: DISCONTINUED | OUTPATIENT
Start: 2023-02-19 | End: 2023-02-21 | Stop reason: HOSPADM

## 2023-02-19 RX ORDER — HYDROCODONE BITARTRATE AND ACETAMINOPHEN 10; 325 MG/1; MG/1
1 TABLET ORAL EVERY 8 HOURS PRN
Status: DISCONTINUED | OUTPATIENT
Start: 2023-02-19 | End: 2023-02-21 | Stop reason: HOSPADM

## 2023-02-19 RX ORDER — ONDANSETRON 2 MG/ML
4 INJECTION INTRAMUSCULAR; INTRAVENOUS EVERY 6 HOURS PRN
Status: DISCONTINUED | OUTPATIENT
Start: 2023-02-19 | End: 2023-02-21 | Stop reason: HOSPADM

## 2023-02-19 RX ORDER — NITROGLYCERIN 0.4 MG/1
0.4 TABLET SUBLINGUAL
Status: DISCONTINUED | OUTPATIENT
Start: 2023-02-19 | End: 2023-02-21 | Stop reason: HOSPADM

## 2023-02-19 RX ORDER — LISINOPRIL 5 MG/1
5 TABLET ORAL
Status: DISCONTINUED | OUTPATIENT
Start: 2023-02-19 | End: 2023-02-21 | Stop reason: HOSPADM

## 2023-02-19 RX ORDER — PANTOPRAZOLE SODIUM 40 MG/1
40 TABLET, DELAYED RELEASE ORAL DAILY
Status: DISCONTINUED | OUTPATIENT
Start: 2023-02-19 | End: 2023-02-21 | Stop reason: HOSPADM

## 2023-02-19 RX ORDER — SODIUM CHLORIDE 0.9 % (FLUSH) 0.9 %
10 SYRINGE (ML) INJECTION AS NEEDED
Status: DISCONTINUED | OUTPATIENT
Start: 2023-02-19 | End: 2023-02-21 | Stop reason: HOSPADM

## 2023-02-19 RX ORDER — ACETAMINOPHEN 325 MG/1
650 TABLET ORAL EVERY 4 HOURS PRN
Status: DISCONTINUED | OUTPATIENT
Start: 2023-02-19 | End: 2023-02-21 | Stop reason: HOSPADM

## 2023-02-19 RX ORDER — SODIUM CHLORIDE 9 MG/ML
40 INJECTION, SOLUTION INTRAVENOUS AS NEEDED
Status: DISCONTINUED | OUTPATIENT
Start: 2023-02-19 | End: 2023-02-21 | Stop reason: HOSPADM

## 2023-02-19 RX ORDER — SODIUM CHLORIDE 0.9 % (FLUSH) 0.9 %
10 SYRINGE (ML) INJECTION EVERY 12 HOURS SCHEDULED
Status: DISCONTINUED | OUTPATIENT
Start: 2023-02-19 | End: 2023-02-21 | Stop reason: HOSPADM

## 2023-02-19 RX ORDER — ACETAMINOPHEN 160 MG/5ML
650 SOLUTION ORAL EVERY 4 HOURS PRN
Status: DISCONTINUED | OUTPATIENT
Start: 2023-02-19 | End: 2023-02-21 | Stop reason: HOSPADM

## 2023-02-19 RX ADMIN — ROSUVASTATIN CALCIUM 20 MG: 20 TABLET, FILM COATED ORAL at 20:10

## 2023-02-19 RX ADMIN — CEFTRIAXONE SODIUM 1 G: 1 INJECTION, POWDER, FOR SOLUTION INTRAMUSCULAR; INTRAVENOUS at 16:46

## 2023-02-19 RX ADMIN — PANTOPRAZOLE SODIUM 40 MG: 40 TABLET, DELAYED RELEASE ORAL at 20:12

## 2023-02-19 RX ADMIN — PREGABALIN 50 MG: 50 CAPSULE ORAL at 20:12

## 2023-02-19 RX ADMIN — HYDROCODONE BITARTRATE AND ACETAMINOPHEN 1 TABLET: 10; 325 TABLET ORAL at 20:12

## 2023-02-19 RX ADMIN — MIRABEGRON 25 MG: 25 TABLET, FILM COATED, EXTENDED RELEASE ORAL at 20:10

## 2023-02-19 RX ADMIN — APIXABAN 5 MG: 5 TABLET, FILM COATED ORAL at 20:12

## 2023-02-19 RX ADMIN — SODIUM CHLORIDE 100 ML/HR: 9 INJECTION, SOLUTION INTRAVENOUS at 18:18

## 2023-02-19 RX ADMIN — LISINOPRIL 5 MG: 5 TABLET ORAL at 20:11

## 2023-02-19 RX ADMIN — AMLODIPINE BESYLATE 2.5 MG: 5 TABLET ORAL at 20:11

## 2023-02-19 RX ADMIN — Medication 10 ML: at 20:12

## 2023-02-20 LAB
ALBUMIN SERPL-MCNC: 3.2 G/DL (ref 3.5–5.2)
ALBUMIN/GLOB SERPL: 1.3 G/DL
ALP SERPL-CCNC: 118 U/L (ref 39–117)
ALT SERPL W P-5'-P-CCNC: 17 U/L (ref 1–41)
ANION GAP SERPL CALCULATED.3IONS-SCNC: 9.9 MMOL/L (ref 5–15)
AST SERPL-CCNC: 22 U/L (ref 1–40)
BASOPHILS # BLD AUTO: 0.06 10*3/MM3 (ref 0–0.2)
BASOPHILS NFR BLD AUTO: 0.5 % (ref 0–1.5)
BILIRUB SERPL-MCNC: 0.7 MG/DL (ref 0–1.2)
BUN SERPL-MCNC: 11 MG/DL (ref 8–23)
BUN/CREAT SERPL: 11.3 (ref 7–25)
CALCIUM SPEC-SCNC: 8.6 MG/DL (ref 8.6–10.5)
CHLORIDE SERPL-SCNC: 107 MMOL/L (ref 98–107)
CO2 SERPL-SCNC: 23.1 MMOL/L (ref 22–29)
CREAT SERPL-MCNC: 0.97 MG/DL (ref 0.76–1.27)
D-LACTATE SERPL-SCNC: 1.9 MMOL/L (ref 0.5–2)
DEPRECATED RDW RBC AUTO: 44.5 FL (ref 37–54)
EGFRCR SERPLBLD CKD-EPI 2021: 79.9 ML/MIN/1.73
EOSINOPHIL # BLD AUTO: 0.23 10*3/MM3 (ref 0–0.4)
EOSINOPHIL NFR BLD AUTO: 2 % (ref 0.3–6.2)
ERYTHROCYTE [DISTWIDTH] IN BLOOD BY AUTOMATED COUNT: 16.1 % (ref 12.3–15.4)
GEN 5 2HR TROPONIN T REFLEX: 25 NG/L
GLOBULIN UR ELPH-MCNC: 2.5 GM/DL
GLUCOSE SERPL-MCNC: 127 MG/DL (ref 65–99)
HBA1C MFR BLD: 6.3 % (ref 4.8–5.6)
HCT VFR BLD AUTO: 51.7 % (ref 37.5–51)
HGB BLD-MCNC: 15.7 G/DL (ref 13–17.7)
LYMPHOCYTES # BLD AUTO: 1.49 10*3/MM3 (ref 0.7–3.1)
LYMPHOCYTES NFR BLD AUTO: 12.7 % (ref 19.6–45.3)
MCH RBC QN AUTO: 24.6 PG (ref 26.6–33)
MCHC RBC AUTO-ENTMCNC: 30.4 G/DL (ref 31.5–35.7)
MCV RBC AUTO: 80.9 FL (ref 79–97)
MONOCYTES # BLD AUTO: 1.48 10*3/MM3 (ref 0.1–0.9)
MONOCYTES NFR BLD AUTO: 12.6 % (ref 5–12)
NEUTROPHILS NFR BLD AUTO: 71.1 % (ref 42.7–76)
NEUTROPHILS NFR BLD AUTO: 8.36 10*3/MM3 (ref 1.7–7)
PLATELET # BLD AUTO: 374 10*3/MM3 (ref 140–450)
PMV BLD AUTO: 10.9 FL (ref 6–12)
POTASSIUM SERPL-SCNC: 4 MMOL/L (ref 3.5–5.2)
PROCALCITONIN SERPL-MCNC: 0.15 NG/ML (ref 0–0.25)
PROT SERPL-MCNC: 5.7 G/DL (ref 6–8.5)
RBC # BLD AUTO: 6.39 10*6/MM3 (ref 4.14–5.8)
SODIUM SERPL-SCNC: 140 MMOL/L (ref 136–145)
TROPONIN T DELTA: 3 NG/L
TROPONIN T SERPL HS-MCNC: 22 NG/L
WBC NRBC COR # BLD: 11.75 10*3/MM3 (ref 3.4–10.8)

## 2023-02-20 PROCEDURE — 80053 COMPREHEN METABOLIC PANEL: CPT | Performed by: INTERNAL MEDICINE

## 2023-02-20 PROCEDURE — 83036 HEMOGLOBIN GLYCOSYLATED A1C: CPT | Performed by: INTERNAL MEDICINE

## 2023-02-20 PROCEDURE — 84145 PROCALCITONIN (PCT): CPT | Performed by: INTERNAL MEDICINE

## 2023-02-20 PROCEDURE — 25010000002 CEFTRIAXONE PER 250 MG: Performed by: INTERNAL MEDICINE

## 2023-02-20 PROCEDURE — 84484 ASSAY OF TROPONIN QUANT: CPT | Performed by: INTERNAL MEDICINE

## 2023-02-20 PROCEDURE — 83605 ASSAY OF LACTIC ACID: CPT | Performed by: INTERNAL MEDICINE

## 2023-02-20 PROCEDURE — 85025 COMPLETE CBC W/AUTO DIFF WBC: CPT | Performed by: INTERNAL MEDICINE

## 2023-02-20 RX ADMIN — APIXABAN 5 MG: 5 TABLET, FILM COATED ORAL at 21:12

## 2023-02-20 RX ADMIN — CEFTRIAXONE SODIUM 1 G: 1 INJECTION, POWDER, FOR SOLUTION INTRAMUSCULAR; INTRAVENOUS at 16:21

## 2023-02-20 RX ADMIN — PREGABALIN 50 MG: 50 CAPSULE ORAL at 21:12

## 2023-02-20 RX ADMIN — PANTOPRAZOLE SODIUM 40 MG: 40 TABLET, DELAYED RELEASE ORAL at 09:26

## 2023-02-20 RX ADMIN — AMLODIPINE BESYLATE 2.5 MG: 5 TABLET ORAL at 09:26

## 2023-02-20 RX ADMIN — ROSUVASTATIN CALCIUM 20 MG: 20 TABLET, FILM COATED ORAL at 09:26

## 2023-02-20 RX ADMIN — Medication 10 ML: at 09:27

## 2023-02-20 RX ADMIN — PREGABALIN 50 MG: 50 CAPSULE ORAL at 09:26

## 2023-02-20 RX ADMIN — Medication 10 ML: at 21:13

## 2023-02-20 RX ADMIN — LISINOPRIL 5 MG: 5 TABLET ORAL at 09:26

## 2023-02-20 RX ADMIN — APIXABAN 5 MG: 5 TABLET, FILM COATED ORAL at 09:26

## 2023-02-20 RX ADMIN — HYDROCODONE BITARTRATE AND ACETAMINOPHEN 1 TABLET: 10; 325 TABLET ORAL at 16:21

## 2023-02-20 RX ADMIN — PREGABALIN 50 MG: 50 CAPSULE ORAL at 16:21

## 2023-02-20 RX ADMIN — MIRABEGRON 25 MG: 25 TABLET, FILM COATED, EXTENDED RELEASE ORAL at 09:26

## 2023-02-20 NOTE — PROGRESS NOTES
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  Seems stable see attached report.  Repeat 1 year?

## 2023-02-21 ENCOUNTER — READMISSION MANAGEMENT (OUTPATIENT)
Dept: CALL CENTER | Facility: HOSPITAL | Age: 78
End: 2023-02-21
Payer: MEDICARE

## 2023-02-21 VITALS
BODY MASS INDEX: 26.52 KG/M2 | HEIGHT: 68 IN | HEART RATE: 51 BPM | OXYGEN SATURATION: 93 % | TEMPERATURE: 97.8 F | SYSTOLIC BLOOD PRESSURE: 143 MMHG | RESPIRATION RATE: 18 BRPM | DIASTOLIC BLOOD PRESSURE: 69 MMHG | WEIGHT: 175 LBS

## 2023-02-21 LAB
ANION GAP SERPL CALCULATED.3IONS-SCNC: 6.9 MMOL/L (ref 5–15)
BASOPHILS # BLD AUTO: 0.05 10*3/MM3 (ref 0–0.2)
BASOPHILS NFR BLD AUTO: 0.5 % (ref 0–1.5)
BUN SERPL-MCNC: 13 MG/DL (ref 8–23)
BUN/CREAT SERPL: 14 (ref 7–25)
CALCIUM SPEC-SCNC: 8.5 MG/DL (ref 8.6–10.5)
CHLORIDE SERPL-SCNC: 108 MMOL/L (ref 98–107)
CO2 SERPL-SCNC: 26.1 MMOL/L (ref 22–29)
CREAT SERPL-MCNC: 0.93 MG/DL (ref 0.76–1.27)
DEPRECATED RDW RBC AUTO: 42.4 FL (ref 37–54)
EGFRCR SERPLBLD CKD-EPI 2021: 84 ML/MIN/1.73
EOSINOPHIL # BLD AUTO: 0.27 10*3/MM3 (ref 0–0.4)
EOSINOPHIL NFR BLD AUTO: 2.9 % (ref 0.3–6.2)
ERYTHROCYTE [DISTWIDTH] IN BLOOD BY AUTOMATED COUNT: 15.7 % (ref 12.3–15.4)
GLUCOSE SERPL-MCNC: 116 MG/DL (ref 65–99)
HCT VFR BLD AUTO: 49.3 % (ref 37.5–51)
HGB BLD-MCNC: 15.5 G/DL (ref 13–17.7)
IMM GRANULOCYTES # BLD AUTO: 0.14 10*3/MM3 (ref 0–0.05)
IMM GRANULOCYTES NFR BLD AUTO: 1.5 % (ref 0–0.5)
LYMPHOCYTES # BLD AUTO: 1.24 10*3/MM3 (ref 0.7–3.1)
LYMPHOCYTES NFR BLD AUTO: 13.1 % (ref 19.6–45.3)
MCH RBC QN AUTO: 24.9 PG (ref 26.6–33)
MCHC RBC AUTO-ENTMCNC: 31.4 G/DL (ref 31.5–35.7)
MCV RBC AUTO: 79.3 FL (ref 79–97)
MONOCYTES # BLD AUTO: 1.08 10*3/MM3 (ref 0.1–0.9)
MONOCYTES NFR BLD AUTO: 11.5 % (ref 5–12)
NEUTROPHILS NFR BLD AUTO: 6.65 10*3/MM3 (ref 1.7–7)
NEUTROPHILS NFR BLD AUTO: 70.5 % (ref 42.7–76)
NRBC BLD AUTO-RTO: 0 /100 WBC (ref 0–0.2)
PLATELET # BLD AUTO: 324 10*3/MM3 (ref 140–450)
PMV BLD AUTO: 10.5 FL (ref 6–12)
POTASSIUM SERPL-SCNC: 3.8 MMOL/L (ref 3.5–5.2)
RBC # BLD AUTO: 6.22 10*6/MM3 (ref 4.14–5.8)
SARS-COV-2 RNA RESP QL NAA+PROBE: NOT DETECTED
SODIUM SERPL-SCNC: 141 MMOL/L (ref 136–145)
WBC NRBC COR # BLD: 9.43 10*3/MM3 (ref 3.4–10.8)

## 2023-02-21 PROCEDURE — U0003 INFECTIOUS AGENT DETECTION BY NUCLEIC ACID (DNA OR RNA); SEVERE ACUTE RESPIRATORY SYNDROME CORONAVIRUS 2 (SARS-COV-2) (CORONAVIRUS DISEASE [COVID-19]), AMPLIFIED PROBE TECHNIQUE, MAKING USE OF HIGH THROUGHPUT TECHNOLOGIES AS DESCRIBED BY CMS-2020-01-R: HCPCS | Performed by: HOSPITALIST

## 2023-02-21 PROCEDURE — U0005 INFEC AGEN DETEC AMPLI PROBE: HCPCS | Performed by: HOSPITALIST

## 2023-02-21 PROCEDURE — 80048 BASIC METABOLIC PNL TOTAL CA: CPT | Performed by: HOSPITALIST

## 2023-02-21 PROCEDURE — 85025 COMPLETE CBC W/AUTO DIFF WBC: CPT | Performed by: HOSPITALIST

## 2023-02-21 RX ORDER — CEFDINIR 300 MG/1
300 CAPSULE ORAL 2 TIMES DAILY
Qty: 8 CAPSULE | Refills: 0 | Status: SHIPPED | OUTPATIENT
Start: 2023-02-21 | End: 2023-02-22 | Stop reason: SINTOL

## 2023-02-21 RX ADMIN — HYDROCODONE BITARTRATE AND ACETAMINOPHEN 1 TABLET: 10; 325 TABLET ORAL at 08:30

## 2023-02-21 RX ADMIN — HYDROCODONE BITARTRATE AND ACETAMINOPHEN 1 TABLET: 10; 325 TABLET ORAL at 00:29

## 2023-02-21 RX ADMIN — ROSUVASTATIN CALCIUM 20 MG: 20 TABLET, FILM COATED ORAL at 08:30

## 2023-02-21 RX ADMIN — PANTOPRAZOLE SODIUM 40 MG: 40 TABLET, DELAYED RELEASE ORAL at 08:30

## 2023-02-21 RX ADMIN — PREGABALIN 50 MG: 50 CAPSULE ORAL at 08:30

## 2023-02-21 RX ADMIN — Medication 10 ML: at 08:31

## 2023-02-21 RX ADMIN — AMLODIPINE BESYLATE 2.5 MG: 5 TABLET ORAL at 08:30

## 2023-02-21 RX ADMIN — APIXABAN 5 MG: 5 TABLET, FILM COATED ORAL at 08:30

## 2023-02-21 RX ADMIN — LISINOPRIL 5 MG: 5 TABLET ORAL at 08:30

## 2023-02-21 RX ADMIN — MIRABEGRON 25 MG: 25 TABLET, FILM COATED, EXTENDED RELEASE ORAL at 12:35

## 2023-02-21 NOTE — PROGRESS NOTES
Please inform the patient that MRCP shows stable pancreatic cyst recommendations are to repeat in 1 year.

## 2023-02-22 ENCOUNTER — TELEPHONE (OUTPATIENT)
Dept: FAMILY MEDICINE CLINIC | Facility: CLINIC | Age: 78
End: 2023-02-22

## 2023-02-22 ENCOUNTER — TRANSITIONAL CARE MANAGEMENT TELEPHONE ENCOUNTER (OUTPATIENT)
Dept: CALL CENTER | Facility: HOSPITAL | Age: 78
End: 2023-02-22
Payer: MEDICARE

## 2023-02-22 RX ORDER — AZITHROMYCIN 250 MG/1
TABLET, FILM COATED ORAL
Qty: 6 TABLET | Refills: 0 | Status: SHIPPED | OUTPATIENT
Start: 2023-02-22 | End: 2023-02-27

## 2023-02-22 NOTE — OUTREACH NOTE
Call Center TCM Note    Flowsheet Row Responses   [facility] patient discharged from? [place]   Does the patient have one of the following disease processes/diagnoses(primary or secondary)? Pneumonia   TCM attempt successful? Yes  [Wife]   Call start time 0832   Call end time 0835   Discharge diagnosis Weakness,   pneumonia   Meds reviewed with patient/caregiver? Yes   Is the patient having any side effects they believe may be caused by any medication additions or changes? No   Does the patient have all medications ordered at discharge? Yes   Is the patient taking all medications as directed (includes completed medication regime)? Yes   Comments 2/27/23 at 1:30   Does the patient have an appointment with their PCP within 7 days of discharge? Yes   Has home health visited the patient within 72 hours of discharge? N/A   Pulse Ox monitoring Intermittent   O2 Sat comments 97% on RA last night.   O2 Sat: education provided Sat levels, When to seek care   O2 Sat education comments Advised pt if O2 levels drop to 89% or below and does not rebound with deep breathing and rest to seek emergency medical attention.   Psychosocial issues? No   Did the patient receive a copy of their discharge instructions? Yes   Nursing interventions Reviewed instructions with patient   What is the patient's perception of their health status since discharge? Improving   Are the patient's immunizations up to date?  Yes   If the patient is a current smoker, are they able to teach back resources for cessation? Not a smoker   Is the patient/caregiver able to teach back the hierarchy of who to call/visit for symptoms/problems? PCP, Specialist, Home health nurse, Urgent Care, ED, 911 Yes   Is the patient/caregiver able to teach back signs and symptoms of worsening condition: Shortness of breath, Chest pain, Fever/chills   TCM call completed? Yes   Call end time 0835   Would this patient benefit from a Referral to [facility]? No   Is the  patient interested in additional calls from an ambulatory ?  NOTE:  applies to high risk patients requiring additional follow-up. Daly Reese RN    2/22/2023, 08:38 EST

## 2023-02-22 NOTE — OUTREACH NOTE
Prep Survey    Flowsheet Row Responses   [facility] patient discharged from? [place]   Is LACE score < 7 ? No   Eligibility [facility]   Date of Admission 02/19/23   Date of Discharge 02/21/23   Discharge Disposition Home or Self Care   Discharge diagnosis Weakness,   pneumonia   Does the patient have one of the following disease processes/diagnoses(primary or secondary)? Pneumonia   Does the patient have Home health ordered? No   Is there a DME ordered? No   Prep survey completed? Yes          Marta TILLMAN - Registered Nurse

## 2023-02-22 NOTE — TELEPHONE ENCOUNTER
Caller: Brooke Santoro    Relationship: Emergency Contact    Best call back number:    [phone]          What medications are you currently taking:   Current Outpatient Medications on File Prior to Visit   Medication Sig Dispense Refill   • allopurinol (Zyloprim) 300 MG tablet Take 1 tablet by mouth Daily. 90 tablet 1   • amLODIPine (NORVASC) 2.5 MG tablet Take 1 tablet by mouth Daily. 90 tablet 3   • apixaban (ELIQUIS) 5 MG tablet tablet Take 1 tablet by mouth 2 (Two) Times a Day. 90 tablet 3   • cefdinir (OMNICEF) 300 MG capsule Take 1 capsule by mouth 2 (Two) Times a Day for 4 days. 8 capsule 0   • HYDROcodone-acetaminophen (NORCO)  MG per tablet Take 1 tablet by mouth Every 8 (Eight) Hours As Needed for Severe Pain. 30 day supply 75 tablet 0   • mupirocin (BACTROBAN) 2 % cream Apply 1 application topically to the appropriate area as directed 2 (Two) Times a Day. 15 g 0   • Myrbetriq 25 MG tablet sustained-release 24 hour 24 hr tablet Take 25 mg by mouth Daily.     • pantoprazole (PROTONIX) 40 MG EC tablet Take 1 tablet by mouth Daily. 90 tablet 1   • pregabalin (Lyrica) 50 MG capsule Take 1 capsule by mouth 3 (Three) Times a Day. 90 capsule 5   • ramipril (ALTACE) 5 MG capsule Take 1 capsule by mouth Daily. 90 capsule 1   • rosuvastatin (Crestor) 20 MG tablet Take 1 tablet by mouth Daily. 90 tablet 1     Current Facility-Administered Medications on File Prior to Visit   Medication Dose Route Frequency Provider Last Rate Last Admin   • [DISCONTINUED] acetaminophen (TYLENOL) 160 MG/5ML solution 650 mg  650 mg Oral Q4H PRN Salima Rogers MD       • [DISCONTINUED] acetaminophen (TYLENOL) suppository 650 mg  650 mg Rectal Q4H PRN Salima Rogers MD       • [DISCONTINUED] acetaminophen (TYLENOL) tablet 650 mg  650 mg Oral Q4H PRN Salima Rogers MD       • [DISCONTINUED] amLODIPine (NORVASC) tablet 2.5 mg  2.5 mg Oral Daily Salima Rogers MD   2.5 mg at 02/21/23 0830   • [DISCONTINUED] apixaban (ELIQUIS) tablet  5 mg  5 mg Oral BID Salima Rogers MD   5 mg at 02/21/23 0830   • [DISCONTINUED] cefTRIAXone (ROCEPHIN) 1 g in sodium chloride 0.9 % 100 mL IVPB-VTB  1 g Intravenous Q24H Salima Rogers  mL/hr at 02/20/23 1621 1 g at 02/20/23 1621   • [DISCONTINUED] HYDROcodone-acetaminophen (NORCO)  MG per tablet 1 tablet  1 tablet Oral Q8H PRN Salima Rogers MD   1 tablet at 02/21/23 0830   • [DISCONTINUED] lisinopril (PRINIVIL,ZESTRIL) tablet 5 mg  5 mg Oral Q24H Salima Rogers MD   5 mg at 02/21/23 0830   • [DISCONTINUED] Mirabegron ER (MYRBETRIQ) 24 hr tablet 25 mg  25 mg Oral Daily Salima Rogers MD   25 mg at 02/21/23 1235   • [DISCONTINUED] nitroglycerin (NITROSTAT) SL tablet 0.4 mg  0.4 mg Sublingual Q5 Min PRN Salima Rogers MD       • [DISCONTINUED] ondansetron (ZOFRAN) injection 4 mg  4 mg Intravenous Q6H PRN Salima Rogers MD       • [DISCONTINUED] pantoprazole (PROTONIX) EC tablet 40 mg  40 mg Oral Daily Salima Rogers MD   40 mg at 02/21/23 0830   • [DISCONTINUED] pregabalin (LYRICA) capsule 50 mg  50 mg Oral TID Salima Rogers MD   50 mg at 02/21/23 0830   • [DISCONTINUED] rosuvastatin (CRESTOR) tablet 20 mg  20 mg Oral Daily Salima Rogers MD   20 mg at 02/21/23 0830   • [DISCONTINUED] sodium chloride 0.9 % flush 10 mL  10 mL Intravenous PRN Salima Rogers MD       • [DISCONTINUED] sodium chloride 0.9 % flush 10 mL  10 mL Intravenous Q12H Salima Rogers MD   10 mL at 02/21/23 0831   • [DISCONTINUED] sodium chloride 0.9 % flush 10 mL  10 mL Intravenous PRN Salima Rogers MD       • [DISCONTINUED] sodium chloride 0.9 % infusion 40 mL  40 mL Intravenous PRN Salima Rogers MD              CONCERNS:    PATIENT WAS DISMISSED FROM THE HOSPITAL 02/21/2023.  THEY HAVE HIM ON A ANTIBIOTIC, CEFDINIE, AND IT HAS CAUSED PATIENT TO HAVE SEVERE DIARRHEA.    SPOUSE IS WANTING TO KNOW CAN YOU PRESCRIBE HIM SOMETHING DIFFERENT.    PLEASE ADVISE

## 2023-02-22 NOTE — TELEPHONE ENCOUNTER
I talked to patient's spouse Stop the Cefdinir and I sent in a ZPack. Use Imodium until diarrhea resolves.

## 2023-02-24 LAB
BACTERIA SPEC AEROBE CULT: NORMAL
BACTERIA SPEC AEROBE CULT: NORMAL

## 2023-02-25 NOTE — PROGRESS NOTES
Transitional Care Follow Up Visit  Subjective     Madhu Santoro is a 77 y.o. male who presents for a transitional care management visit.    Within 48 business hours after discharge our office contacted him via telephone to coordinate his care and needs.      I reviewed and discussed the details of that call along with the discharge summary, hospital problems, inpatient lab results, inpatient diagnostic studies, and consultation reports with Madhu.     Current outpatient and discharge medications have been reconciled for the patient.  Reviewed by: Damian Sanchez MD      Date of TCM Phone Call 2/21/2023   [facility]   Date of Admission 2/19/2023   Date of Discharge 2/21/2023   Discharge Disposition Home or Self Care     Risk for Readmission (LACE) Score: 9 (2/21/2023  6:00 AM)      History of Present Illness   Course During Hospital Stay:      Admit date: 2/19/2023  Discharge date and time:2/21/2023  Discharged Condition: good     Discharge Diagnoses:        Active Hospital Problems     Diagnosis   POA   • **Weakness [R53.1]   Yes   • Pneumonia [J18.9]   Unknown   • Close exposure to COVID-19 virus [Z20.822]   Unknown   • Polycythemia [D75.1]   Yes   • Chronic anticoagulation [Z79.01]   Not Applicable   • Chronic bilateral low back pain without sciatica [M54.50, G89.29]   Yes   • Chronic pain of both hips [M25.551, M25.552, G89.29]   Yes   • Stroke (HCC) [I63.9]   Yes   • PAD (peripheral artery disease) (HCC) [I73.9]   Yes   • S/P CABG (coronary artery bypass graft) [Z95.1]   Not Applicable   • S/P ablation of atrial flutter [Z98.890, Z86.79]   Not Applicable   • Gastroesophageal reflux disease [K21.9]   Yes   • Benign essential hypertension [I10]   Yes   • Anxiety [F41.9]   Yes   • Arthritis [M19.90]   Yes       Resolved Hospital Problems   No resolved problems to display.     Hospital Course: Madhu Santoro  is a 77-year-old male with past medical history remarkable for history of atrial  fibrillation/flutter status post ablation, on chronic anticoagulation therapy, history of coronary artery disease, hypertension and history of prior stroke with residual mobility issues and balance, history of neuropathy and prior history of kidney stone and hypertension and degenerative disc disease of the lumbar spine was in his usual state of his health when he went to bed on the evening prior to admission.  He woke up and felt weak but somehow was able to get up out of the bed and needed to go downstairs in the basement to  something.  After he went downstairs he lost his balance and fell on his right side and afterwards could not get up because of weakness and pain involving the right elbow and right hip.  His wife called their son for help and EMS was called and patient was brought to the emergency room for further evaluation.  Work-up in the emergency room included CT scan of the brain without contrast but did not show any acute intracranial process, CT scan of the lumbar spine without contrast shows lumbar degenerative disease changes with no fractures, x-ray of the pelvis chest elbow and right hip did not show any acute fractures, and CT scan of the right lower extremity and ankle did not show any fracture or other acute abnormalities.  Patient's chest x-ray did show patchy areas of increased density in the right upper to midlung concerning for pneumonia.  Patient was noted to have white blood cell count of 18,000 and although also noted to have skin tear involving the right elbow which was dressed in the emergency room.  Patient has intact range of motion of his right elbow and right hip.  Blood cultures were drawn patient was given IV fluids and was started on IV Rocephin.  His respiratory viral panel was reported as negative.  Patient is feeling somewhat better and stronger since he has been in the hospital.  It is reported that her wife had a positive COVID test recently on a home test.  Patient  "was recently started on Lyrica for his neuropathy about 10 days ago.  The patient was admitted to the hospital and treated with IV antibiotics for pneumonia.  He was feeling better after couple days was able to get up and ambulate independently without any assistance.  He looked well enough to go home we will have him finish a few more days of oral antibiotics for pneumonia.  We also did recheck him for COVID prior to discharge he is still COVID-negative and was advised to isolate from his wife for little while longer until she gets over it.  He will follow-up with his primary care in 1 week for ongoing care.    Current outpatient and discharge medications have been reconciled for the patient.  Reviewed by: Damian Sanchez MD    Pt's wife called when he got home as was having severe diarrhea on the Omnicef, thus I change his Abx to ZPack.     Pt feeling better today and is starting to walk/move more.             The following portions of the patient's history were reviewed and updated as appropriate: allergies, current medications, past family history, past medical history, past social history, past surgical history and problem list.    Review of Systems   Constitutional: Negative for activity change, chills and fever.   Respiratory: Negative for cough.    Cardiovascular: Negative for chest pain.   Psychiatric/Behavioral: Negative for dysphoric mood.       /72   Pulse 59   Temp 97.5 °F (36.4 °C) (Oral)   Resp 14   Ht 172.7 cm (68\")   Wt 81.6 kg (180 lb)   BMI 27.37 kg/m²     Objective   Physical Exam  Constitutional:       General: He is not in acute distress.     Appearance: He is well-developed.   Cardiovascular:      Rate and Rhythm: Normal rate and regular rhythm.   Pulmonary:      Effort: Pulmonary effort is normal.      Breath sounds: Normal breath sounds.   Neurological:      Mental Status: He is alert and oriented to person, place, and time.   Psychiatric:         Behavior: Behavior normal.        "  Thought Content: Thought content normal.     Hospital records reviewed with pt confirming HPI.  Uric Acid 7.8 recently and Allopurinol started prior to his admission.    Assessment & Plan   Diagnoses and all orders for this visit:    1. Pneumonia of right upper lobe due to infectious organism (Primary)  Comments:  pt to repea the CXR in 2 weeks to monitor clearing  Orders:  -     XR Chest 2 View; Future    2. Weakness    3. Leukocytosis, unspecified type  Comments:  resolved at time of discharge    4. Hospital discharge follow-up    Pt to slowly increase activity to baseline and maintain good hydration.

## 2023-02-27 ENCOUNTER — OFFICE VISIT (OUTPATIENT)
Dept: FAMILY MEDICINE CLINIC | Facility: CLINIC | Age: 78
End: 2023-02-27
Payer: MEDICARE

## 2023-02-27 VITALS
HEIGHT: 68 IN | DIASTOLIC BLOOD PRESSURE: 72 MMHG | SYSTOLIC BLOOD PRESSURE: 152 MMHG | BODY MASS INDEX: 27.28 KG/M2 | RESPIRATION RATE: 14 BRPM | WEIGHT: 180 LBS | TEMPERATURE: 97.5 F | HEART RATE: 59 BPM

## 2023-02-27 DIAGNOSIS — J18.9 PNEUMONIA OF RIGHT UPPER LOBE DUE TO INFECTIOUS ORGANISM: Primary | ICD-10-CM

## 2023-02-27 DIAGNOSIS — D72.829 LEUKOCYTOSIS, UNSPECIFIED TYPE: ICD-10-CM

## 2023-02-27 DIAGNOSIS — Z09 HOSPITAL DISCHARGE FOLLOW-UP: ICD-10-CM

## 2023-02-27 DIAGNOSIS — R53.1 WEAKNESS: ICD-10-CM

## 2023-02-27 PROCEDURE — 99495 TRANSJ CARE MGMT MOD F2F 14D: CPT | Performed by: FAMILY MEDICINE

## 2023-02-27 PROCEDURE — 71046 X-RAY EXAM CHEST 2 VIEWS: CPT | Performed by: FAMILY MEDICINE

## 2023-03-02 ENCOUNTER — READMISSION MANAGEMENT (OUTPATIENT)
Dept: CALL CENTER | Facility: HOSPITAL | Age: 78
End: 2023-03-02
Payer: MEDICARE

## 2023-03-02 DIAGNOSIS — G89.29 CHRONIC BILATERAL LOW BACK PAIN WITHOUT SCIATICA: ICD-10-CM

## 2023-03-02 DIAGNOSIS — G89.29 CHRONIC PAIN OF BOTH HIPS: ICD-10-CM

## 2023-03-02 DIAGNOSIS — M54.50 CHRONIC BILATERAL LOW BACK PAIN WITHOUT SCIATICA: ICD-10-CM

## 2023-03-02 DIAGNOSIS — M25.551 CHRONIC PAIN OF BOTH HIPS: ICD-10-CM

## 2023-03-02 DIAGNOSIS — M25.552 CHRONIC PAIN OF BOTH HIPS: ICD-10-CM

## 2023-03-02 RX ORDER — HYDROCODONE BITARTRATE AND ACETAMINOPHEN 10; 325 MG/1; MG/1
1 TABLET ORAL EVERY 8 HOURS PRN
Qty: 75 TABLET | Refills: 0 | Status: SHIPPED | OUTPATIENT
Start: 2023-03-02 | End: 2023-04-04 | Stop reason: SDUPTHER

## 2023-03-02 NOTE — TELEPHONE ENCOUNTER
Medication Refill Request    Date of phone call: 3/2/23    Medication being requested: Norco  mg    si tab po q 8 hrs prn  Qty: 75    Date of last visit: 23    Date of last refill:     RUTH up to date?:     Next Follow up?: 23    Any new pertinent information? (i.e, new medication allergies, new use of medications, change in patient's health or condition, non-compliance or inconsistency with prescribing agreement?):

## 2023-03-02 NOTE — OUTREACH NOTE
COPD/PN Week 2 Survey    Flowsheet Row Responses   [facility] patient discharged from? [place]   Does the patient have one of the following disease processes/diagnoses(primary or secondary)? Pneumonia   Week 2 attempt successful? Yes   Call start time 1302   Call end time 1304   Week 2 call completed? Yes   Wrap up additional comments Brief call. Pt at a restaraunt eating lunch. Pt reports feeling well. No questions.          Alanis TILLMAN - Registered Nurse

## 2023-03-22 NOTE — PROGRESS NOTES
"Pankaj Santoro is a 77 y.o. male.     CC: Leg Edema    History of Present Illness     Pt comes in today reporting a roughly 3 weeks h/o swelling of the bilateral LE w/o symptoms of CHF (no orthopnea/PND). Pt was started on Lyrica early February for chronic neuropathy.        The following portions of the patient's history were reviewed and updated as appropriate: allergies, current medications, past family history, past medical history, past social history, past surgical history and problem list.    Review of Systems   Constitutional: Negative for activity change, chills and fever.   Respiratory: Negative for cough.    Cardiovascular: Positive for leg swelling. Negative for chest pain and palpitations.   Psychiatric/Behavioral: Negative for dysphoric mood.       /69   Pulse 61   Temp 97.4 °F (36.3 °C) (Oral)   Resp 14   Ht 172.7 cm (68\")   Wt 82.1 kg (181 lb)   SpO2 97%   BMI 27.52 kg/m²     Objective   Physical Exam  Constitutional:       General: He is not in acute distress.     Appearance: He is well-developed.   Cardiovascular:      Rate and Rhythm: Normal rate and regular rhythm.      Comments: 2+ bilateral edema noted  Pulmonary:      Effort: Pulmonary effort is normal.      Breath sounds: Normal breath sounds.   Neurological:      Mental Status: He is alert and oriented to person, place, and time.   Psychiatric:         Behavior: Behavior normal.         Thought Content: Thought content normal.         Assessment & Plan   Diagnoses and all orders for this visit:    1. Peripheral edema (Primary)  -     furosemide (Lasix) 20 MG tablet; Take 1 tablet by mouth Daily.  Dispense: 7 tablet; Refill: 1  -     potassium chloride 10 MEQ CR tablet; Take 1 tablet by mouth Daily.  Dispense: 7 tablet; Refill: 1    Pt instructed on Lyrica wean and d/c. Most certainly a drug reaction causing this.            "

## 2023-03-23 ENCOUNTER — OFFICE VISIT (OUTPATIENT)
Dept: FAMILY MEDICINE CLINIC | Facility: CLINIC | Age: 78
End: 2023-03-23
Payer: MEDICARE

## 2023-03-23 VITALS
TEMPERATURE: 97.4 F | BODY MASS INDEX: 27.43 KG/M2 | OXYGEN SATURATION: 97 % | SYSTOLIC BLOOD PRESSURE: 158 MMHG | HEIGHT: 68 IN | WEIGHT: 181 LBS | DIASTOLIC BLOOD PRESSURE: 69 MMHG | RESPIRATION RATE: 14 BRPM | HEART RATE: 61 BPM

## 2023-03-23 DIAGNOSIS — R60.9 PERIPHERAL EDEMA: Primary | ICD-10-CM

## 2023-03-23 PROCEDURE — 3077F SYST BP >= 140 MM HG: CPT | Performed by: FAMILY MEDICINE

## 2023-03-23 PROCEDURE — 1160F RVW MEDS BY RX/DR IN RCRD: CPT | Performed by: FAMILY MEDICINE

## 2023-03-23 PROCEDURE — 3078F DIAST BP <80 MM HG: CPT | Performed by: FAMILY MEDICINE

## 2023-03-23 PROCEDURE — 1159F MED LIST DOCD IN RCRD: CPT | Performed by: FAMILY MEDICINE

## 2023-03-23 PROCEDURE — 99213 OFFICE O/P EST LOW 20 MIN: CPT | Performed by: FAMILY MEDICINE

## 2023-03-23 RX ORDER — FUROSEMIDE 20 MG/1
20 TABLET ORAL DAILY
Qty: 7 TABLET | Refills: 1 | Status: SHIPPED | OUTPATIENT
Start: 2023-03-23 | End: 2023-04-04

## 2023-03-23 RX ORDER — POTASSIUM CHLORIDE 750 MG/1
10 TABLET, FILM COATED, EXTENDED RELEASE ORAL DAILY
Qty: 7 TABLET | Refills: 1 | Status: SHIPPED | OUTPATIENT
Start: 2023-03-23 | End: 2023-04-04

## 2023-04-03 ENCOUNTER — TELEPHONE (OUTPATIENT)
Dept: FAMILY MEDICINE CLINIC | Facility: CLINIC | Age: 78
End: 2023-04-03

## 2023-04-03 NOTE — TELEPHONE ENCOUNTER
PATIENT WAS TAKEN OF LYRICA FOR EDEMA IN LEGS . PATIENT NOW HAVING WEAKNESS AND UNABLE TO WALK VERY FAR. PT ADVISED TO GO TO THE ER FOR FURTHER EVALUATION. SPOUSE WANTED TO CHECK WITH YOU AND SEE IF THIS COULD BE BECAUSE OF THE LYRICA

## 2023-04-03 NOTE — TELEPHONE ENCOUNTER
Caller: Brooke Santoro    Relationship: Emergency Contact    Best call back number: [phone]    What medication are you requesting: DR. CHAPPELL RECOMMENDATION     What are your current symptoms: TROUBLE STANDING AND WALKING, PAIN    How long have you been experiencing symptoms: 2 WEEKS    Additional notes: [employer] TRANSFERRED, TOLD PATIENT WOULD PUT IN MESSAGE ANYWAY.

## 2023-04-03 NOTE — TELEPHONE ENCOUNTER
I TALKED TO PT. PT INSTRUCTED TO INCREASE FLUIDS AS DIRECTED BY DR CHAPPELL., AND SCHEDULE AN APPOINTMENT IF NO IMPROVEMENT. IF PATIENT GETS WORSE HE IS TO GO TO THE ER.

## 2023-04-04 ENCOUNTER — OFFICE VISIT (OUTPATIENT)
Dept: PAIN MEDICINE | Facility: CLINIC | Age: 78
End: 2023-04-04
Payer: MEDICARE

## 2023-04-04 VITALS
OXYGEN SATURATION: 98 % | TEMPERATURE: 98 F | SYSTOLIC BLOOD PRESSURE: 158 MMHG | BODY MASS INDEX: 25.91 KG/M2 | HEART RATE: 66 BPM | WEIGHT: 171 LBS | DIASTOLIC BLOOD PRESSURE: 82 MMHG | HEIGHT: 68 IN

## 2023-04-04 DIAGNOSIS — M47.816 LUMBAR FACET ARTHROPATHY: ICD-10-CM

## 2023-04-04 DIAGNOSIS — G89.29 CHRONIC PAIN OF BOTH HIPS: ICD-10-CM

## 2023-04-04 DIAGNOSIS — M54.50 CHRONIC BILATERAL LOW BACK PAIN WITHOUT SCIATICA: Primary | ICD-10-CM

## 2023-04-04 DIAGNOSIS — M25.552 CHRONIC PAIN OF BOTH HIPS: ICD-10-CM

## 2023-04-04 DIAGNOSIS — G89.29 CHRONIC BILATERAL LOW BACK PAIN WITHOUT SCIATICA: Primary | ICD-10-CM

## 2023-04-04 DIAGNOSIS — M25.551 CHRONIC PAIN OF BOTH HIPS: ICD-10-CM

## 2023-04-04 DIAGNOSIS — M25.50 ARTHRALGIA OF MULTIPLE JOINTS: ICD-10-CM

## 2023-04-04 DIAGNOSIS — R60.9 PERIPHERAL EDEMA: ICD-10-CM

## 2023-04-04 PROCEDURE — 1125F AMNT PAIN NOTED PAIN PRSNT: CPT

## 2023-04-04 PROCEDURE — 99214 OFFICE O/P EST MOD 30 MIN: CPT

## 2023-04-04 PROCEDURE — 1159F MED LIST DOCD IN RCRD: CPT

## 2023-04-04 PROCEDURE — 1160F RVW MEDS BY RX/DR IN RCRD: CPT

## 2023-04-04 PROCEDURE — 3077F SYST BP >= 140 MM HG: CPT

## 2023-04-04 PROCEDURE — 3079F DIAST BP 80-89 MM HG: CPT

## 2023-04-04 RX ORDER — FUROSEMIDE 20 MG/1
TABLET ORAL
Qty: 7 TABLET | Refills: 1 | Status: SHIPPED | OUTPATIENT
Start: 2023-04-04

## 2023-04-04 RX ORDER — HYDROCODONE BITARTRATE AND ACETAMINOPHEN 10; 325 MG/1; MG/1
1 TABLET ORAL EVERY 8 HOURS PRN
Qty: 75 TABLET | Refills: 0 | Status: SHIPPED | OUTPATIENT
Start: 2023-04-04

## 2023-04-04 RX ORDER — POTASSIUM CHLORIDE 750 MG/1
TABLET, FILM COATED, EXTENDED RELEASE ORAL
Qty: 7 TABLET | Refills: 1 | Status: SHIPPED | OUTPATIENT
Start: 2023-04-04

## 2023-04-04 NOTE — PROGRESS NOTES
"CHIEF COMPLAINT  Back and joint pain    Subjective   Madhu Santoro is a 77 y.o. male  who presents for follow-up.  He has a history of chronic low back pain. He reports that his back pain has slightly worsened since his last office visit.    Today pain is 8/10VAS in severity. Pain is located in his lumbar spine and runs across his belt line. He reports occasional radiating pain to bilateral legs (\"entire leg\"). He reports pain in bilateral hands and numbness to bilateral feet. Describes this pain as a nearly continuous aching and throbbing. Pain is worsened by prolonged walking or standing, increased physical activity, and bending/twisitng. Pain improves with rest/reposition, use of a heating pad, and medication. He reports that he has tried PT in the past but this only caused increased pain.     Continues with Hydrocodone 10mg 2-3/day and OTC Tylenol PRN. Reports occasional constipation that is managed by Miralax and laxatives. Denies any other side effects from the regimen including somnolence. The regimen helps \"take the edge off\". Notes improvement in activity and function with regimen. ADL's by self. Denies any bowel or bladder changes.      Not a candidate for NSAIDs - history of TIA's and remains on Eliquis     Continues to have no interest in interventional procedures due to a friend having developed foot drop following a procedure for back pain.     At last office visit, he complained of numbness to bilateral lower extremities. He saw Dr. Sanchez on 2/27/23 and was started on Pregabalin 50mg TID. Patient was unable to tolerate this medication due to increased swelling to BLE. He has since weaned off of medication. He was also started on Allopurinol 300mg for gout. He is scheduled to see Dr. Sanchez on 4/6/23.     Sees Dr. Lopez for thrombocytosis secondary to iron deficiency anemia. He gets iron infusions as needed.     Back Pain  This is a chronic problem. The current episode started more than 1 year " ago. The problem occurs constantly. The problem has been gradually worsening since onset. The pain is present in the lumbar spine. The quality of the pain is described as aching (throbbing). Radiates to: to bilateral legs L > R  The pain is at a severity of 8/10. The symptoms are aggravated by standing, sitting, twisting, bending, lying down and position (walking long distances, weather changes). Stiffness is present all day. Associated symptoms include numbness (bilateral feet/toes, left hand at times) and weakness. Pertinent negatives include no abdominal pain, chest pain, dysuria, fever or headaches. He has tried heat and analgesics (rest/reposition, PT in the past (this caused increased pain)) for the symptoms. The treatment provided mild relief.   Joint Pain  This is a chronic (bilateral hands, left shoulder, bilateral knees) problem. The current episode started more than 1 year ago. The problem occurs daily. The problem has been waxing and waning. Associated symptoms include arthralgias, fatigue, numbness (bilateral feet/toes, left hand at times) and weakness. Pertinent negatives include no abdominal pain, chest pain, chills, congestion, coughing, fever, headaches or joint swelling. The symptoms are aggravated by walking, standing and bending (weather changes, increased physical activity, ). He has tried oral narcotics, position changes, rest, heat, lying down and acetaminophen for the symptoms. The treatment provided moderate relief.      PEG Assessment   What number best describes your pain on average in the past week?10  What number best describes how, during the past week, pain has interfered with your enjoyment of life?10  What number best describes how, during the past week, pain has interfered with your general activity?  10    The following portions of the patient's history were reviewed and updated as appropriate: allergies, current medications, past family history, past medical history, past social  "history, past surgical history and problem list.    Review of Systems   Constitutional: Positive for fatigue. Negative for chills and fever.   HENT: Negative for congestion.    Respiratory: Negative for cough and shortness of breath.    Cardiovascular: Negative for chest pain.   Gastrointestinal: Negative for abdominal pain, constipation and diarrhea.   Genitourinary: Negative for difficulty urinating and dysuria.   Musculoskeletal: Positive for arthralgias and back pain. Negative for joint swelling.   Neurological: Positive for weakness and numbness (bilateral feet/toes, left hand at times). Negative for dizziness, light-headedness and headaches.   Psychiatric/Behavioral: Positive for sleep disturbance. Negative for suicidal ideas.     I have reviewed and confirmed the accuracy of the ROS as documented by the MA/LPN/RN MY Perez    Vitals:    04/04/23 1054   BP: 158/82   BP Location: Left arm   Patient Position: Sitting   Pulse: 66   Temp: 98 °F (36.7 °C)   TempSrc: Temporal   SpO2: 98%   Weight: 77.6 kg (171 lb)   Height: 172.7 cm (68\")   PainSc:   8   PainLoc: Back     Objective   Physical Exam  Constitutional:       Appearance: Normal appearance.   HENT:      Head: Normocephalic.   Cardiovascular:      Rate and Rhythm: Normal rate and regular rhythm.   Pulmonary:      Effort: Pulmonary effort is normal.      Breath sounds: Normal breath sounds.   Musculoskeletal:      Cervical back: Normal range of motion.      Lumbar back: Tenderness present. Decreased range of motion. Positive right straight leg raise test and positive left straight leg raise test.      Comments: Diffuse arthritic changes   Skin:     General: Skin is warm and dry.      Capillary Refill: Capillary refill takes less than 2 seconds.   Neurological:      General: No focal deficit present.      Mental Status: He is alert and oriented to person, place, and time.   Psychiatric:         Mood and Affect: Mood normal.         Behavior: " Behavior normal.         Thought Content: Thought content normal.         Cognition and Memory: Cognition normal.       Assessment & Plan   Diagnoses and all orders for this visit:    1. Chronic bilateral low back pain without sciatica (Primary)  -     HYDROcodone-acetaminophen (NORCO)  MG per tablet; Take 1 tablet by mouth Every 8 (Eight) Hours As Needed for Severe Pain. 30 day supply  Dispense: 75 tablet; Refill: 0    2. Arthralgia of multiple joints    3. Chronic pain of both hips  -     HYDROcodone-acetaminophen (NORCO)  MG per tablet; Take 1 tablet by mouth Every 8 (Eight) Hours As Needed for Severe Pain. 30 day supply  Dispense: 75 tablet; Refill: 0    4. Lumbar facet arthropathy    --- The urine drug screen confirmation from 2/6/23 has been reviewed and the result is appropriate based on patient history and RUTH report  --- The patient signed an updated copy of the controlled substance agreement on 6/6/22  --- Encouraged patient to try OTC pain creams for back/foot pain  --- Refill Hydrocodone. DNF 4/5/23. Patient appears stable with current regimen. No adverse effects. Regarding continuation of opioids, there is no evidence of aberrant behavior or any red flags.  The patient continues with appropriate response to opioid therapy. ADL's remain intact by self.   --- Follow-up 2 months or sooner if needed     RUTH REPORT  As part of the patient's treatment plan, I am prescribing controlled substances. The patient has been made aware of appropriate use of such medications, including potential risk of somnolence, limited ability to drive and/or work safely, and the potential for dependence or overdose. It has also been made clear that these medications are for use by this patient only, without concomitant use of alcohol or other substances unless prescribed.     Patient has completed prescribing agreement detailing terms of continued prescribing of controlled substances, including monitoring RUTH  reports, urine drug screening, and pill counts if necessary. The patient is aware that inappropriate use will results in cessation of prescribing such medications.    As the clinician, I personally reviewed the RUTH from 4/4/23 while the patient was in the office today.    History and physical exam exhibit continued safe and appropriate use of controlled substances.    Dictated utilizing Dragon dictation.

## 2023-04-05 NOTE — PROGRESS NOTES
"Subjective   Madhu Santoro is a 77 y.o. male.     CC: Weakness    History of Present Illness     Patient returns today after sending this message 3 days ago:    PATIENT WAS TAKEN OFF LYRICA FOR EDEMA IN LEGS . PATIENT NOW HAVING WEAKNESS AND UNABLE TO WALK VERY FAR. PT ADVISED TO GO TO THE ER FOR FURTHER EVALUATION. SPOUSE WANTED TO CHECK WITH YOU AND SEE IF THIS COULD BE BECAUSE OF THE LYRICA    My response was this:    Lyrica can cause edema, but the fatigue and weakness would be unusual. I would get him to the ED (or at least the [facility] [place]) today to be evaluated.    Mr. Santoro decided not to go to the urgent care or the emergency room and rather come in here to see me today.    Mr. Santoro had been admitted to the hospital about 6 weeks ago for pneumonia of the lung and weakness.    Today, Mr. Santoro reports that the swelling has gone down markedly and is just minimally there.  He does continue to have bilateral burning sensation in his feet and his right foot has some shooting pain, at times, along the medial surface from the ankle region to the great toe.  He does currently see podiatry, Dr. Erwin, and has an appointment this coming Tuesday.  He is requesting a B12 injection today, as he is taken these in the past and seem to help.    The following portions of the patient's history were reviewed and updated as appropriate: allergies, current medications, past family history, past medical history, past social history, past surgical history and problem list.    Review of Systems   Constitutional: Negative for activity change, chills and fever.   Respiratory: Negative for cough.    Cardiovascular: Positive for leg swelling (improving). Negative for chest pain.   Psychiatric/Behavioral: Negative for dysphoric mood.       /72   Pulse 62   Temp 97.9 °F (36.6 °C) (Oral)   Resp 14   Ht 172.7 cm (68\")   Wt 78 kg (172 lb)   SpO2 99%   BMI 26.15 kg/m²     Objective   Physical Exam  Constitutional:       General: " He is not in acute distress.     Appearance: He is well-developed.   Cardiovascular:      Rate and Rhythm: Normal rate and regular rhythm.      Comments: Trace LE edema (improved)  Pulmonary:      Effort: Pulmonary effort is normal.      Breath sounds: Normal breath sounds.   Neurological:      Mental Status: He is alert and oriented to person, place, and time.   Psychiatric:         Behavior: Behavior normal.         Thought Content: Thought content normal.         Assessment & Plan   Diagnoses and all orders for this visit:    1. Neuropathy (Primary)  -     cyanocobalamin injection 1,000 mcg    2. Peripheral edema  Comments:  improving; pt to finish the Lasix/KCL (there is a refill if needed)    Will see what Dr Erwin can help us with regarding the neuropathy and potential Tarsal Tunnel.  I am hesitant today to start anything orally for the neuropathy, as he is still getting over the side effects of the Lyrica.  We will reassess in the future.

## 2023-04-06 ENCOUNTER — OFFICE VISIT (OUTPATIENT)
Dept: FAMILY MEDICINE CLINIC | Facility: CLINIC | Age: 78
End: 2023-04-06
Payer: MEDICARE

## 2023-04-06 VITALS
HEIGHT: 68 IN | DIASTOLIC BLOOD PRESSURE: 72 MMHG | WEIGHT: 172 LBS | SYSTOLIC BLOOD PRESSURE: 153 MMHG | RESPIRATION RATE: 14 BRPM | TEMPERATURE: 97.9 F | BODY MASS INDEX: 26.07 KG/M2 | HEART RATE: 62 BPM | OXYGEN SATURATION: 99 %

## 2023-04-06 DIAGNOSIS — G62.9 NEUROPATHY: Primary | ICD-10-CM

## 2023-04-06 DIAGNOSIS — R60.9 PERIPHERAL EDEMA: ICD-10-CM

## 2023-04-06 RX ORDER — CYANOCOBALAMIN 1000 UG/ML
1000 INJECTION, SOLUTION INTRAMUSCULAR; SUBCUTANEOUS
Status: SHIPPED | OUTPATIENT
Start: 2023-04-06

## 2023-04-06 RX ADMIN — CYANOCOBALAMIN 1000 MCG: 1000 INJECTION, SOLUTION INTRAMUSCULAR; SUBCUTANEOUS at 09:57

## 2023-04-06 NOTE — PROGRESS NOTES
Injection  [id number] performed in right deltoid  by Betty Russell MA. Patient tolerated the procedure well without complications.  04/06/23   Betty Russell MA

## 2023-04-10 ENCOUNTER — LAB (OUTPATIENT)
Dept: LAB | Facility: HOSPITAL | Age: 78
End: 2023-04-10
Payer: MEDICARE

## 2023-04-10 DIAGNOSIS — D50.9 IRON DEFICIENCY ANEMIA, UNSPECIFIED IRON DEFICIENCY ANEMIA TYPE: ICD-10-CM

## 2023-04-10 DIAGNOSIS — Z79.01 CHRONIC ANTICOAGULATION: ICD-10-CM

## 2023-04-10 DIAGNOSIS — D75.1 POLYCYTHEMIA: ICD-10-CM

## 2023-04-10 DIAGNOSIS — D75.1 POLYCYTHEMIA: Primary | ICD-10-CM

## 2023-04-10 DIAGNOSIS — I63.412 CEREBROVASCULAR ACCIDENT (CVA) DUE TO EMBOLISM OF LEFT MIDDLE CEREBRAL ARTERY: ICD-10-CM

## 2023-04-10 LAB
BASOPHILS # BLD AUTO: 0.08 10*3/MM3 (ref 0–0.2)
BASOPHILS NFR BLD AUTO: 0.5 % (ref 0–1.5)
DEPRECATED RDW RBC AUTO: 54.1 FL (ref 37–54)
EOSINOPHIL # BLD AUTO: 0.21 10*3/MM3 (ref 0–0.4)
EOSINOPHIL NFR BLD AUTO: 1.4 % (ref 0.3–6.2)
ERYTHROCYTE [DISTWIDTH] IN BLOOD BY AUTOMATED COUNT: 21.5 % (ref 12.3–15.4)
FERRITIN SERPL-MCNC: 41.8 NG/ML (ref 30–400)
HCT VFR BLD AUTO: 60.9 % (ref 37.5–51)
HGB BLD-MCNC: 18.3 G/DL (ref 13–17.7)
IMM GRANULOCYTES # BLD AUTO: 0.19 10*3/MM3 (ref 0–0.05)
IMM GRANULOCYTES NFR BLD AUTO: 1.3 % (ref 0–0.5)
IRON 24H UR-MRATE: 44 MCG/DL (ref 59–158)
IRON SATN MFR SERPL: 10 % (ref 14–48)
LYMPHOCYTES # BLD AUTO: 1.21 10*3/MM3 (ref 0.7–3.1)
LYMPHOCYTES NFR BLD AUTO: 8.2 % (ref 19.6–45.3)
MCH RBC QN AUTO: 23.6 PG (ref 26.6–33)
MCHC RBC AUTO-ENTMCNC: 30 G/DL (ref 31.5–35.7)
MCV RBC AUTO: 78.6 FL (ref 79–97)
MONOCYTES # BLD AUTO: 1.26 10*3/MM3 (ref 0.1–0.9)
MONOCYTES NFR BLD AUTO: 8.6 % (ref 5–12)
NEUTROPHILS NFR BLD AUTO: 11.75 10*3/MM3 (ref 1.7–7)
NEUTROPHILS NFR BLD AUTO: 80 % (ref 42.7–76)
NRBC BLD AUTO-RTO: 0 /100 WBC (ref 0–0.2)
PLATELET # BLD AUTO: 477 10*3/MM3 (ref 140–450)
PMV BLD AUTO: 10.3 FL (ref 6–12)
RBC # BLD AUTO: 7.75 10*6/MM3 (ref 4.14–5.8)
TIBC SERPL-MCNC: 456 MCG/DL (ref 249–505)
TRANSFERRIN SERPL-MCNC: 326 MG/DL (ref 200–360)
WBC NRBC COR # BLD: 14.7 10*3/MM3 (ref 3.4–10.8)

## 2023-04-10 PROCEDURE — 36415 COLL VENOUS BLD VENIPUNCTURE: CPT

## 2023-04-10 PROCEDURE — 83540 ASSAY OF IRON: CPT

## 2023-04-10 PROCEDURE — 85025 COMPLETE CBC W/AUTO DIFF WBC: CPT

## 2023-04-10 PROCEDURE — 84466 ASSAY OF TRANSFERRIN: CPT

## 2023-04-10 PROCEDURE — 82728 ASSAY OF FERRITIN: CPT

## 2023-04-10 RX ORDER — SODIUM CHLORIDE 9 MG/ML
250 INJECTION, SOLUTION INTRAVENOUS ONCE
Status: CANCELLED | OUTPATIENT
Start: 2023-04-17

## 2023-04-14 NOTE — PROGRESS NOTES
Subjective Patient noting chronic back pain, unchanged performance status with continued weakness, recent nephrolithiasis    REASON FOR FOLLOW-UP: Thrombocytosis secondary to iron deficiency anemia      History of present illness     The patient is a 77-year-old male followed with a number of issues including coronary artery disease, status post CABG, atrial flutter, previous CVA hyperlipidemia, GE reflux, carotid vascular disease, and hypertension.     He had been seen by vascular 2/25/2022 with mild progression of carotid disease but otherwise stable and also had follow-up with pain management for back pain 3/28/2022 requiring chronic narcotic therapy.  Patient has been resistant to epidural like procedures.     Unfortunately has had concurrent constipation requiring laxatives and additional opioid antagonist to reduce GI hypomotility.  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     Patient recently reviewed again by pain management with worsening pain.  Patient also saw GI periodically undergoing a follow-up MRI of the abdomen 8/9/2022 with scattered pancreatic cystic lesions unchanged from previous.  He had previously undergone EGD and colonoscopy 11/5/2021 with irregular Z-line and biopsies taken in nonbleeding internal hemorrhoids found during retroflexion that were small.  There are scattered small and large mouth diverticula found in the sigmoid colon descending colon and splenic flexure.       The patient now presents for thrombocytosis with review of his record over the last year demonstrating a platelet count that is escalated from 3-400,000 now to 8/11/2022 648,000 but concurrent microcytic and hypochromic indices.  These indices have been slowly reducing over the last 2 years approximately followed more closely.        These findings are  discussed with the patient 8/16/2022 who indicates that he actually feels about the same though still has issues with back pain.  He is noted no change in bowel habit including, fortunately, improvement of his constipation without the use of additional medication such as Movantik.  He has not noted any change in the color of his stool including dark stools or any blood per rectum, urine though he does note periodic excess bruising from his anticoagulation therapy.       The patient moved to a trial of iron which, unfortunately, worsened his constipation in which he had discontinue. He had further issues in early September with left renal stone, requiring cystoscopy, stent placement 8/23/2022.       He is next seen back 9/28/2022 with repeat studies performed 9/21 demonstrating 5% iron saturation, ferritin of 12.5.  He remains further weight and fatigue, again oral iron intolerant and will require IV iron preparations for his iron deficiency anemia.      The patient is next seen 1/18/2023 with considerable improvement in his testing including H&H now 17.2 and 55.4 though with iron saturation of 8% and ferritin 26.5.  He has not noted any blood loss but remains generally weak and with ongoing periodic abdominal pain.  His major concerns continue to be a disequilibrium since his previous CVA symptoms.  He has not been seen in follow-up by neurology.  He continues to have hematuria by urinary assessment but not gross findings.  We have discussed that he may actually have a myeloproliferative disorder and requested that he undergo an assessment for JAK2 analysis today.    He is seen back 4/17/2023JAK  2-V617 F mutation having been detected.  In the interval he was admitted 2/19-21/23 for weakness, fall and ER evaluation not demonstrate any acute intracranial process, CT of lumbar spine with lumbar degenerative disease and other studies not show any acute abnormalities.  Fortunately he went on to improve though his wife  "had a positive COVID test recently on home examination.  His follow-up testing 4/10/2023 include an H&H of 18.3 and 60.9 white count of 14,700 platelet count of 477,000.    He is seen 4/17/2023 and we discussed that he is better served with phlebotomy at this point.  He states he feels so poorly that he is quite willing to try to move to additional therapies including phlebotomy.      Past Medical History:   Diagnosis Date   • Anxiety    • Arthritis    • Atrial flutter     Status post cavotricuspid isthmus ablation by Dr. Gustafson on 4/18/17   • Mandel esophagus    • Benign essential hypertension    • CAD (coronary artery disease)     3 vessel CABG 4/11/17 by Dr. Cortez: ROSARIO-prox LAD, SVG-OM1, SVG-OM3   • Carotid artery disease     Status post carotid endarterectomy - USG 4/10/17: 50-59% NICHELLE, 1-15% LICA.    • Colonic polyp    • Cyst of pancreas    • DDD (degenerative disc disease), lumbosacral    • GERD (gastroesophageal reflux disease)    • H/O bone density study 2013   • H/O complete eye exam 2014   • History of kidney stone    • HLD (hyperlipidemia)    • Hypertension    • Kidney stone     8/22/22   • Lipid screening 05/31/2013   • Low back pain     [facility]   • Screening for prostate cancer 07/07/2015   • Skin cancer     nose   • Stroke     RESIDUAL--\"BALANCE ISSUES\"        Past Surgical History:   Procedure Laterality Date   • CARDIAC CATHETERIZATION N/A 04/10/2017    Procedure: Left Heart Cath;  Surgeon: Marjorie Healy MD;  Location: [facility] CATH INVASIVE LOCATION;  Service:    • CARDIAC CATHETERIZATION N/A 04/10/2017    Procedure: Coronary angiography;  Surgeon: Marjorie Healy MD;  Location:  [facility] CATH INVASIVE LOCATION;  Service:    • CARDIAC CATHETERIZATION N/A 04/10/2017    Procedure: Left ventriculography;  Surgeon: Marjorie Healy MD;  [facility];  Service:    • CARDIAC CATHETERIZATION  2011   • CARDIAC ELECTROPHYSIOLOGY PROCEDURE N/A " 04/18/2017    Procedure: Ablation atrial flutter;  Surgeon: Jose Antonio Gustafson MD;  Location: [facility] CATH INVASIVE LOCATION;  Service:    • CAROTID ENDARTERECTOMY     • CHOLECYSTECTOMY     • CHOLECYSTECTOMY WITH INTRAOPERATIVE CHOLANGIOGRAM N/A 09/07/2019    Procedure: Laparoscopic cholecystectomy with intraoperative cholangiogram;  Surgeon: Gauri Travis MD;  Location: [facility] MAIN OR;  Service: General   • COLONOSCOPY  01/06/2015    Diverticulosis, one TA   • COLONOSCOPY N/A 02/14/2019    tics, NBIH, adenomatous polyp x 2   • COLONOSCOPY N/A 11/05/2021    Procedure: COLONOSCOPY TO CECUM AND TERM. ILEUM WITH COLD POLYPECTOMIES;  Surgeon: Everton Abel MD;  Location: [facility] ENDOSCOPY;  Service: Gastroenterology;  Laterality: N/A;  PRE OP - PERS H/O POLYPS  POST OP - COLON POLYPS,, DIVERTICULOSIS, HEMORRHOIDS   • CORONARY ARTERY BYPASS GRAFT N/A 04/11/2017    Procedure: AR STERNOTOMY CORONARY ARTERY BYPASS GRAFT TIMES 3 USING LEFT INTERNAL MAMMARY ARTERY AND LEFT GREATER SAPHENOUS VEIN GRAFT PER ENDOSCOPIC VEIN HARVESTING AND PRP ;  Surgeon: Temo Cortez MD;  Location: [facility] OR;  Service:    • ENDOSCOPY  01/06/2015    HH, Ervin's esophagus   • ENDOSCOPY N/A 02/14/2019    Z line irregular, HH, Ervin's esophagus   • ENDOSCOPY N/A 11/05/2021    Procedure: ESOPHAGOGASTRODUODENOSCOPY WITH BIOPSIES;  Surgeon: Everton Abel MD;  Location: [facility] ENDOSCOPY;  Service: Gastroenterology;  Laterality: N/A;  PRE OP - PERS H/O ERVIN'S  POST OP - IRREG Z LINE   • KNEE SURGERY Left    • URETEROSCOPY LASER LITHOTRIPSY WITH STENT INSERTION Left 8/23/2022    Procedure: Cysto retrograde with left uretro stent placement;  Surgeon: Damien Oliveira MD;  Location: [facility] MAIN OR;  Service: Urology;  Laterality: Left;   • VASECTOMY          Current Outpatient Medications on File Prior to Visit   Medication Sig Dispense Refill   • allopurinol (Zyloprim) 300 MG tablet Take 1 tablet by mouth Daily. 90 tablet 1   •  amLODIPine (NORVASC) 2.5 MG tablet Take 1 tablet by mouth Daily. 90 tablet 3   • apixaban (ELIQUIS) 5 MG tablet tablet Take 1 tablet by mouth 2 (Two) Times a Day. 90 tablet 3   • furosemide (LASIX) 20 MG tablet TAKE ONE TABLET BY MOUTH DAILY 7 tablet 1   • HYDROcodone-acetaminophen (NORCO)  MG per tablet Take 1 tablet by mouth Every 8 (Eight) Hours As Needed for Severe Pain. 30 day supply 75 tablet 0   • mupirocin (BACTROBAN) 2 % cream Apply 1 application topically to the appropriate area as directed 2 (Two) Times a Day. 15 g 0   • Myrbetriq 25 MG tablet sustained-release 24 hour 24 hr tablet Take 1 tablet by mouth Daily.     • pantoprazole (PROTONIX) 40 MG EC tablet Take 1 tablet by mouth Daily. 90 tablet 1   • potassium chloride 10 MEQ CR tablet TAKE ONE TABLET BY MOUTH DAILY 7 tablet 1   • ramipril (ALTACE) 5 MG capsule Take 1 capsule by mouth Daily. 90 capsule 1   • rosuvastatin (Crestor) 20 MG tablet Take 1 tablet by mouth Daily. 90 tablet 1     Current Facility-Administered Medications on File Prior to Visit   Medication Dose Route Frequency Provider Last Rate Last Admin   • cyanocobalamin injection 1,000 mcg  1,000 mcg Intramuscular Q30 Days Damian Sanchez MD   1,000 mcg at 23 0957        ALLERGIES:    Allergies   Allergen Reactions   • Atorvastatin Myalgia     Myalgia   • Penicillins Hives, Swelling and Rash     Tolerates cephalosporins         Social History     Socioeconomic History   • Marital status:      Spouse name: Brooke   • Years of education: 9th grade   Tobacco Use   • Smoking status: Former     Packs/day: 1.00     Types: Cigarettes     Start date: 1959     Quit date: 2009     Years since quittin.2   • Smokeless tobacco: Never   • Tobacco comments:     CAFFEINE USE   Vaping Use   • Vaping Use: Never used   Substance and Sexual Activity   • Alcohol use: Not Currently     Comment: rarely   • Drug use: No   • Sexual activity: Defer     Partners: Female        Family  "History   Problem Relation Age of Onset   • Hypertension Mother    • Heart disease Mother    • Heart attack Mother    • Stroke Mother    • Heart disease Father    • Heart attack Father    • Stroke Father    • Hypertension Sister    • Heart attack Brother    • Heart disease Brother    • No Known Problems Brother    • Heart disease Brother    • Heart attack Brother    • Diabetes Brother    • No Known Problems Maternal Grandmother    • No Known Problems Maternal Grandfather    • No Known Problems Paternal Grandmother    • No Known Problems Paternal Grandfather    • Pancreatic cancer Paternal Cousin    • Arthritis Other    • Diabetes Other    • Hypertension Other    • Kidney disease Other         stones   • Malig Hyperthermia Neg Hx         Review of Systems   Constitutional: Positive for fatigue. Negative for activity change, appetite change and unexpected weight change.   HENT: Positive for dental problem (Edentulous, mouth irritation from dentures).    Eyes: Negative.    Respiratory: Negative.    Cardiovascular: Negative.    Gastrointestinal: Negative.    Endocrine: Negative.    Genitourinary: Positive for flank pain.   Musculoskeletal: Positive for arthralgias and back pain.   Skin: Positive for pallor.   Neurological: Positive for dizziness (Unstable gait and ambulation after previous CVA) and weakness.        Objective     Vitals:    04/17/23 1505   BP: 150/69   Pulse: 55   Resp: 16   Temp: 97.8 °F (36.6 °C)   TempSrc: Temporal   SpO2: 97%   Weight: 78 kg (172 lb)   Height: 172.7 cm (67.99\")   PainSc: 10-Worst pain ever  Comment: neuropathy         4/17/2023     3:05 PM   Current Status   ECOG score 0       Physical Exam  Constitutional:       Appearance: Normal appearance.   HENT:      Head: Normocephalic and atraumatic.      Nose: Nose normal.      Mouth/Throat:      Mouth: Mucous membranes are moist.      Pharynx: Oropharynx is clear.      Comments: Edentulous, generalized gum irritation noted  Eyes:      " Conjunctiva/sclera: Conjunctivae normal.      Pupils: Pupils are equal, round, and reactive to light.   Cardiovascular:      Rate and Rhythm: Normal rate and regular rhythm.      Pulses: Normal pulses.      Heart sounds: Normal heart sounds.   Pulmonary:      Effort: Pulmonary effort is normal.      Breath sounds: Normal breath sounds.   Abdominal:      General: Bowel sounds are normal.      Palpations: Abdomen is soft.   Musculoskeletal:         General: Normal range of motion.   Skin:     General: Skin is dry.   Neurological:      General: No focal deficit present.      Mental Status: He is alert.      Motor: Weakness present.      Coordination: Coordination abnormal.      Gait: Gait abnormal.   Psychiatric:         Mood and Affect: Mood normal.          RECENT LABS:  Hematology WBC   Date Value Ref Range Status   04/10/2023 14.70 (H) 3.40 - 10.80 10*3/mm3 Final   01/06/2023 8.83 3.40 - 10.80 10*3/mm3 Final     RBC   Date Value Ref Range Status   04/10/2023 7.75 (H) 4.14 - 5.80 10*6/mm3 Final   01/06/2023 6.85 (H) 4.14 - 5.80 10*6/mm3 Final     Hemoglobin   Date Value Ref Range Status   04/10/2023 18.3 (H) 13.0 - 17.7 g/dL Final     Hematocrit   Date Value Ref Range Status   04/10/2023 60.9 (H) 37.5 - 51.0 % Final     Platelets   Date Value Ref Range Status   04/10/2023 477 (H) 140 - 450 10*3/mm3 Final          Assessment & Plan      77-year-old male with medical issues including coronary artery disease, status post CABG, atrial flutter status post ablation, previous CVA, hyperlipidemia, GE reflux, cardiovascular disease and hypertension.  He also has chronic back pain followed by pain management requiring chronic narcotic therapy.  His medical issues per tickly cardiovascular and CVA led to eventual placement on aspirin and Eliquis chronically.  He has additionally been seen by GI including review of scattered cystic lesions in the pancreas and EGD colonoscopy as recently as November 2021.      The patient now  presents for thrombocytosis with review of his record over the last year demonstrating a platelet count that is escalated from 3-400,000 now to 8/11/2022 648,000 but concurrent microcytic and hypochromic indices.  These indices have been slowly reducing over the last 2 years approximately followed more closely.  Concurrently is a mild drop in his baseline hemoglobin still well within normal limits.    These issues are discussed with the patient in some detail 8/16/2022 with this thrombocytosis thought, initially, to be related to iron deficiency.  This issupported during his visit with ferritin found to be 16.3 and iron of 26 with 5% saturation and TIBC 511.  We should first moved to replace his iron stores with his platelet count likely to reflect this reconstitution.  If he has continued evidence of iron deficiency despite this or is intolerant to oral iron (noting his history with constipation) then we would need to move to intravenous iron therapy and possibly further GI assessment with capsule endoscopy.  The patient was placed on ferrous gluconate which, unfortunately, he was unable to tolerate with worsening constipation.      He had further issues in early September with left renal stone, requiring cystoscopy, stent placement 8/23/2022.    He is next seen back 9/28/2022 with repeat studies performed 9/21 demonstrating 5% iron saturation, ferritin of 12.5.  He remains further weight and fatigue, again oral iron intolerant and will require IV iron preparations for his iron deficiency anemia.    We discontinued oral iron and proceeded with Injectafer given 9/28/2022 in 10/5/2022     He is reassessed 1/18/2023 with follow-up CBC including H&H of 17.2 and 55.4, white count of 10,650 and platelet count of 470,000, iron of 38 with a percent saturation and ferritin of 26.5.     He has not noted any blood loss but remains generally weak and with ongoing periodic abdominal pain.  He continues to have hematuria by  urinary assessment but not gross findings.  He also to continues to have periodic disequilibrium and unsteadiness and we will request neurologic assessment.  We have discussed that he may actually have a myeloproliferative disorder and requested that he undergo an assessment for JAK2 analysis today.    He is seen back 4/17/2023JAK  2-V617 F mutation having been detected.  In the interval he was admitted 2/19-21/23 for weakness, fall and ER evaluation not demonstrate any acute intracranial process, CT of lumbar spine with lumbar degenerative disease and other studies not show any acute abnormalities.  Fortunately he went on to improve though his wife had a positive COVID test recently on home examination.  His follow-up testing 4/10/2023 include an H&H of 18.3 and 60.9 white count of 14,700 platelet count of 477,000.    He is seen 4/17/2023 and we discussed that he is better served with phlebotomy at this point.      Plan:  *Neurology referral and follow-up    *Proceed with phlebotomy today    *Additional phlebotomy 2 weeks    *4 weeks MD or NP, phlebotomy, initiate Hydrea

## 2023-04-17 ENCOUNTER — OFFICE VISIT (OUTPATIENT)
Dept: ONCOLOGY | Facility: CLINIC | Age: 78
End: 2023-04-17
Payer: MEDICARE

## 2023-04-17 ENCOUNTER — APPOINTMENT (OUTPATIENT)
Dept: LAB | Facility: HOSPITAL | Age: 78
End: 2023-04-17
Payer: MEDICARE

## 2023-04-17 ENCOUNTER — INFUSION (OUTPATIENT)
Dept: ONCOLOGY | Facility: HOSPITAL | Age: 78
End: 2023-04-17
Payer: MEDICARE

## 2023-04-17 VITALS
RESPIRATION RATE: 16 BRPM | TEMPERATURE: 97.8 F | SYSTOLIC BLOOD PRESSURE: 150 MMHG | HEART RATE: 55 BPM | OXYGEN SATURATION: 97 % | DIASTOLIC BLOOD PRESSURE: 69 MMHG | BODY MASS INDEX: 26.07 KG/M2 | HEIGHT: 68 IN | WEIGHT: 172 LBS

## 2023-04-17 DIAGNOSIS — D45 POLYCYTHEMIA VERA: Primary | ICD-10-CM

## 2023-04-17 DIAGNOSIS — D75.1 POLYCYTHEMIA: Primary | ICD-10-CM

## 2023-04-17 PROCEDURE — 1125F AMNT PAIN NOTED PAIN PRSNT: CPT | Performed by: INTERNAL MEDICINE

## 2023-04-17 PROCEDURE — 99195 PHLEBOTOMY: CPT

## 2023-04-17 PROCEDURE — 3078F DIAST BP <80 MM HG: CPT | Performed by: INTERNAL MEDICINE

## 2023-04-17 PROCEDURE — 3077F SYST BP >= 140 MM HG: CPT | Performed by: INTERNAL MEDICINE

## 2023-04-17 PROCEDURE — 99214 OFFICE O/P EST MOD 30 MIN: CPT | Performed by: INTERNAL MEDICINE

## 2023-04-17 RX ORDER — SODIUM CHLORIDE 9 MG/ML
250 INJECTION, SOLUTION INTRAVENOUS ONCE
OUTPATIENT
Start: 2023-04-17

## 2023-04-17 RX ORDER — SODIUM CHLORIDE 9 MG/ML
250 INJECTION, SOLUTION INTRAVENOUS ONCE
Status: COMPLETED | OUTPATIENT
Start: 2023-04-17 | End: 2023-04-17

## 2023-04-17 RX ADMIN — SODIUM CHLORIDE 250 ML: 9 INJECTION, SOLUTION INTRAVENOUS at 16:13

## 2023-04-17 NOTE — LETTER
April 17, 2023     Damian Sanchez MD  [address] [facility]  [address]  [facility] [address]    Patient: Madhu Santoro   YOB: 1945   Date of Visit: 4/17/2023       Dear Dr. Daniel MD:    Thank you for referring Madhu Santoro to me for evaluation. Below are the relevant portions of my assessment and plan of care.    If you have questions, please do not hesitate to call me. I look forward to following Madhu along with you.         Sincerely,        Aquiles Lopez MD        CC: No Recipients    Aquiles Lopez MD  04/17/23 1556  Signed    Subjective Patient noting chronic back pain, unchanged performance status with continued weakness, recent nephrolithiasis    REASON FOR FOLLOW-UP: Thrombocytosis secondary to iron deficiency anemia      History of present illness     The patient is a 77-year-old male followed with a number of issues including coronary artery disease, status post CABG, atrial flutter, previous CVA hyperlipidemia, GE reflux, carotid vascular disease, and hypertension.     He had been seen by vascular 2/25/2022 with mild progression of carotid disease but otherwise stable and also had follow-up with pain management for back pain 3/28/2022 requiring chronic narcotic therapy.  Patient has been resistant to epidural like procedures.     Unfortunately has had concurrent constipation requiring laxatives and additional opioid antagonist to reduce GI hypomotility.  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     Patient recently reviewed again by pain management with worsening pain.  Patient also saw GI periodically undergoing a follow-up MRI of the abdomen 8/9/2022 with scattered pancreatic cystic lesions unchanged from previous.  He had previously undergone EGD and colonoscopy 11/5/2021 with irregular Z-line and biopsies taken  in nonbleeding internal hemorrhoids found during retroflexion that were small.  There are scattered small and large mouth diverticula found in the sigmoid colon descending colon and splenic flexure.       The patient now presents for thrombocytosis with review of his record over the last year demonstrating a platelet count that is escalated from 3-400,000 now to 8/11/2022 648,000 but concurrent microcytic and hypochromic indices.  These indices have been slowly reducing over the last 2 years approximately followed more closely.        These findings are discussed with the patient 8/16/2022 who indicates that he actually feels about the same though still has issues with back pain.  He is noted no change in bowel habit including, fortunately, improvement of his constipation without the use of additional medication such as Movantik.  He has not noted any change in the color of his stool including dark stools or any blood per rectum, urine though he does note periodic excess bruising from his anticoagulation therapy.       The patient moved to a trial of iron which, unfortunately, worsened his constipation in which he had discontinue. He had further issues in early September with left renal stone, requiring cystoscopy, stent placement 8/23/2022.       He is next seen back 9/28/2022 with repeat studies performed 9/21 demonstrating 5% iron saturation, ferritin of 12.5.  He remains further weight and fatigue, again oral iron intolerant and will require IV iron preparations for his iron deficiency anemia.      The patient is next seen 1/18/2023 with considerable improvement in his testing including H&H now 17.2 and 55.4 though with iron saturation of 8% and ferritin 26.5.  He has not noted any blood loss but remains generally weak and with ongoing periodic abdominal pain.  His major concerns continue to be a disequilibrium since his previous CVA symptoms.  He has not been seen in follow-up by neurology.  He continues to  "have hematuria by urinary assessment but not gross findings.  We have discussed that he may actually have a myeloproliferative disorder and requested that he undergo an assessment for JAK2 analysis today.    He is seen back 4/17/2023JAK  2-V617 F mutation having been detected.  In the interval he was admitted 2/19-21/23 for weakness, fall and ER evaluation not demonstrate any acute intracranial process, CT of lumbar spine with lumbar degenerative disease and other studies not show any acute abnormalities.  Fortunately he went on to improve though his wife had a positive COVID test recently on home examination.  His follow-up testing 4/10/2023 include an H&H of 18.3 and 60.9 white count of 14,700 platelet count of 477,000.    He is seen 4/17/2023 and we discussed that he is better served with phlebotomy at this point.  He states he feels so poorly that he is quite willing to try to move to additional therapies including phlebotomy.      Past Medical History:   Diagnosis Date   • Anxiety    • Arthritis    • Atrial flutter     Status post cavotricuspid isthmus ablation by Dr. Gustafson on 4/18/17   • Mandel esophagus    • Benign essential hypertension    • CAD (coronary artery disease)     3 vessel CABG 4/11/17 by Dr. Cortez: ROSARIO-prox LAD, SVG-OM1, SVG-OM3   • Carotid artery disease     Status post carotid endarterectomy - USG 4/10/17: 50-59% NICHELLE, 1-15% LICA.    • Colonic polyp    • Cyst of pancreas    • DDD (degenerative disc disease), lumbosacral    • GERD (gastroesophageal reflux disease)    • H/O bone density study 2013   • H/O complete eye exam 2014   • History of kidney stone    • HLD (hyperlipidemia)    • Hypertension    • Kidney stone     8/22/22   • Lipid screening 05/31/2013   • Low back pain     physical therapy [facility] 5-12-10   • Screening for prostate cancer 07/07/2015   • Skin cancer     nose   • Stroke     RESIDUAL--\"BALANCE ISSUES\"        Past Surgical History:   Procedure Laterality Date   • " CARDIAC CATHETERIZATION N/A 04/10/2017    Procedure: Left Heart Cath;  Surgeon: Marjorie Healy MD;  Location: [facility] CATH INVASIVE LOCATION;  Service:    • CARDIAC CATHETERIZATION N/A 04/10/2017    Procedure: Coronary angiography;  Surgeon: Marjorie Healy MD;  Location: [facility] CATH INVASIVE LOCATION;  Service:    • CARDIAC CATHETERIZATION N/A 04/10/2017    Procedure: Left ventriculography;  Surgeon: Marjorie Healy MD;  Location: [facility] CATH INVASIVE LOCATION;  Service:    • CARDIAC CATHETERIZATION  2011   • CARDIAC ELECTROPHYSIOLOGY PROCEDURE N/A 04/18/2017    Procedure: Ablation atrial flutter;  Surgeon: Jose Antonio Gustafson MD;  Location: [facility] CATH INVASIVE LOCATION;  Service:    • CAROTID ENDARTERECTOMY     • CHOLECYSTECTOMY     • CHOLECYSTECTOMY WITH INTRAOPERATIVE CHOLANGIOGRAM N/A 09/07/2019    Procedure: Laparoscopic cholecystectomy with intraoperative cholangiogram;  Surgeon: Gauri Travis MD;  Location: [facility] OR;  Service: General   • COLONOSCOPY  01/06/2015    Diverticulosis, one TA   • COLONOSCOPY N/A 02/14/2019    tics, NBIH, adenomatous polyp x 2   • COLONOSCOPY N/A 11/05/2021    Procedure: COLONOSCOPY TO CECUM AND TERM. ILEUM WITH COLD POLYPECTOMIES;  Surgeon: Everton Abel MD;  Location: [facility] ENDOSCOPY;  Service: Gastroenterology;  Laterality: N/A;  PRE OP - PERS H/O POLYPS  POST OP - COLON POLYPS,, DIVERTICULOSIS, HEMORRHOIDS   • CORONARY ARTERY BYPASS GRAFT N/A 04/11/2017    Procedure: AR STERNOTOMY CORONARY ARTERY BYPASS GRAFT TIMES 3 USING LEFT INTERNAL MAMMARY ARTERY AND LEFT GREATER SAPHENOUS VEIN GRAFT PER ENDOSCOPIC VEIN HARVESTING AND PRP ;  Surgeon: Temo Cortez MD;  Location: [facility] OR;  Service:    • ENDOSCOPY  01/06/2015    HH, Mandel's esophagus   • ENDOSCOPY N/A 02/14/2019    Z line irregular, HH, Mandel's esophagus   • ENDOSCOPY N/A 11/05/2021    Procedure: ESOPHAGOGASTRODUODENOSCOPY WITH BIOPSIES;  Surgeon: Everton Abel MD;  Location: [facility]  ENDOSCOPY;  Service: Gastroenterology;  Laterality: N/A;  PRE OP - PERS H/O ERVIN'S  POST OP - IRREG Z LINE   • KNEE SURGERY Left    • URETEROSCOPY LASER LITHOTRIPSY WITH STENT INSERTION Left 8/23/2022    Procedure: Cysto retrograde with left uretro stent placement;  Surgeon: Damien Oliveira MD;  Location: [facility] OR;  Service: Urology;  Laterality: Left;   • VASECTOMY          Current Outpatient Medications on File Prior to Visit   Medication Sig Dispense Refill   • allopurinol (Zyloprim) 300 MG tablet Take 1 tablet by mouth Daily. 90 tablet 1   • amLODIPine (NORVASC) 2.5 MG tablet Take 1 tablet by mouth Daily. 90 tablet 3   • apixaban (ELIQUIS) 5 MG tablet tablet Take 1 tablet by mouth 2 (Two) Times a Day. 90 tablet 3   • furosemide (LASIX) 20 MG tablet TAKE ONE TABLET BY MOUTH DAILY 7 tablet 1   • HYDROcodone-acetaminophen (NORCO)  MG per tablet Take 1 tablet by mouth Every 8 (Eight) Hours As Needed for Severe Pain. 30 day supply 75 tablet 0   • mupirocin (BACTROBAN) 2 % cream Apply 1 application topically to the appropriate area as directed 2 (Two) Times a Day. 15 g 0   • Myrbetriq 25 MG tablet sustained-release 24 hour 24 hr tablet Take 1 tablet by mouth Daily.     • pantoprazole (PROTONIX) 40 MG EC tablet Take 1 tablet by mouth Daily. 90 tablet 1   • potassium chloride 10 MEQ CR tablet TAKE ONE TABLET BY MOUTH DAILY 7 tablet 1   • ramipril (ALTACE) 5 MG capsule Take 1 capsule by mouth Daily. 90 capsule 1   • rosuvastatin (Crestor) 20 MG tablet Take 1 tablet by mouth Daily. 90 tablet 1     Current Facility-Administered Medications on File Prior to Visit   Medication Dose Route Frequency Provider Last Rate Last Admin   • cyanocobalamin injection 1,000 mcg  1,000 mcg Intramuscular Q30 Days Damian Sanchez MD   1,000 mcg at 04/06/23 0957        ALLERGIES:    Allergies   Allergen Reactions   • Atorvastatin Myalgia     Myalgia   • Penicillins Hives, Swelling and Rash     Tolerates cephalosporins          Social History     Socioeconomic History   • Marital status:      Spouse name: Brooke   • Years of education: 9th grade   Tobacco Use   • Smoking status: Former     Packs/day: 1.00     Types: Cigarettes     Start date: 1959     Quit date: 2009     Years since quittin.2   • Smokeless tobacco: Never   • Tobacco comments:     CAFFEINE USE   Vaping Use   • Vaping Use: Never used   Substance and Sexual Activity   • Alcohol use: Not Currently     Comment: rarely   • Drug use: No   • Sexual activity: Defer     Partners: Female        Family History   Problem Relation Age of Onset   • Hypertension Mother    • Heart disease Mother    • Heart attack Mother    • Stroke Mother    • Heart disease Father    • Heart attack Father    • Stroke Father    • Hypertension Sister    • Heart attack Brother    • Heart disease Brother    • No Known Problems Brother    • Heart disease Brother    • Heart attack Brother    • Diabetes Brother    • No Known Problems Maternal Grandmother    • No Known Problems Maternal Grandfather    • No Known Problems Paternal Grandmother    • No Known Problems Paternal Grandfather    • Pancreatic cancer Paternal Cousin    • Arthritis Other    • Diabetes Other    • Hypertension Other    • Kidney disease Other         stones   • Malig Hyperthermia Neg Hx         Review of Systems   Constitutional: Positive for fatigue. Negative for activity change, appetite change and unexpected weight change.   HENT: Positive for dental problem (Edentulous, mouth irritation from dentures).    Eyes: Negative.    Respiratory: Negative.    Cardiovascular: Negative.    Gastrointestinal: Negative.    Endocrine: Negative.    Genitourinary: Positive for flank pain.   Musculoskeletal: Positive for arthralgias and back pain.   Skin: Positive for pallor.   Neurological: Positive for dizziness (Unstable gait and ambulation after previous CVA) and weakness.        Objective     Vitals:    23 1505   BP: 150/69  "  Pulse: 55   Resp: 16   Temp: 97.8 °F (36.6 °C)   TempSrc: Temporal   SpO2: 97%   Weight: 78 kg (172 lb)   Height: 172.7 cm (67.99\")   PainSc: 10-Worst pain ever  Comment: neuropathy         4/17/2023     3:05 PM   Current Status   ECOG score 0       Physical Exam  Constitutional:       Appearance: Normal appearance.   HENT:      Head: Normocephalic and atraumatic.      Nose: Nose normal.      Mouth/Throat:      Mouth: Mucous membranes are moist.      Pharynx: Oropharynx is clear.      Comments: Edentulous, generalized gum irritation noted  Eyes:      Conjunctiva/sclera: Conjunctivae normal.      Pupils: Pupils are equal, round, and reactive to light.   Cardiovascular:      Rate and Rhythm: Normal rate and regular rhythm.      Pulses: Normal pulses.      Heart sounds: Normal heart sounds.   Pulmonary:      Effort: Pulmonary effort is normal.      Breath sounds: Normal breath sounds.   Abdominal:      General: Bowel sounds are normal.      Palpations: Abdomen is soft.   Musculoskeletal:         General: Normal range of motion.   Skin:     General: Skin is dry.   Neurological:      General: No focal deficit present.      Mental Status: He is alert.      Motor: Weakness present.      Coordination: Coordination abnormal.      Gait: Gait abnormal.   Psychiatric:         Mood and Affect: Mood normal.          RECENT LABS:  Hematology WBC   Date Value Ref Range Status   04/10/2023 14.70 (H) 3.40 - 10.80 10*3/mm3 Final   01/06/2023 8.83 3.40 - 10.80 10*3/mm3 Final     RBC   Date Value Ref Range Status   04/10/2023 7.75 (H) 4.14 - 5.80 10*6/mm3 Final   01/06/2023 6.85 (H) 4.14 - 5.80 10*6/mm3 Final     Hemoglobin   Date Value Ref Range Status   04/10/2023 18.3 (H) 13.0 - 17.7 g/dL Final     Hematocrit   Date Value Ref Range Status   04/10/2023 60.9 (H) 37.5 - 51.0 % Final     Platelets   Date Value Ref Range Status   04/10/2023 477 (H) 140 - 450 10*3/mm3 Final          Assessment & Plan      77-year-old male with medical " issues including coronary artery disease, status post CABG, atrial flutter status post ablation, previous CVA, hyperlipidemia, GE reflux, cardiovascular disease and hypertension.  He also has chronic back pain followed by pain management requiring chronic narcotic therapy.  His medical issues per tickly cardiovascular and CVA led to eventual placement on aspirin and Eliquis chronically.  He has additionally been seen by GI including review of scattered cystic lesions in the pancreas and EGD colonoscopy as recently as November 2021.      The patient now presents for thrombocytosis with review of his record over the last year demonstrating a platelet count that is escalated from 3-400,000 now to 8/11/2022 648,000 but concurrent microcytic and hypochromic indices.  These indices have been slowly reducing over the last 2 years approximately followed more closely.  Concurrently is a mild drop in his baseline hemoglobin still well within normal limits.    These issues are discussed with the patient in some detail 8/16/2022 with this thrombocytosis thought, initially, to be related to iron deficiency.  This issupported during his visit with ferritin found to be 16.3 and iron of 26 with 5% saturation and TIBC 511.  We should first moved to replace his iron stores with his platelet count likely to reflect this reconstitution.  If he has continued evidence of iron deficiency despite this or is intolerant to oral iron (noting his history with constipation) then we would need to move to intravenous iron therapy and possibly further GI assessment with capsule endoscopy.  The patient was placed on ferrous gluconate which, unfortunately, he was unable to tolerate with worsening constipation.      He had further issues in early September with left renal stone, requiring cystoscopy, stent placement 8/23/2022.    He is next seen back 9/28/2022 with repeat studies performed 9/21 demonstrating 5% iron saturation, ferritin of 12.5.  He  remains further weight and fatigue, again oral iron intolerant and will require IV iron preparations for his iron deficiency anemia.    We discontinued oral iron and proceeded with Injectafer given 9/28/2022 in 10/5/2022     He is reassessed 1/18/2023 with follow-up CBC including H&H of 17.2 and 55.4, white count of 10,650 and platelet count of 470,000, iron of 38 with a percent saturation and ferritin of 26.5.     He has not noted any blood loss but remains generally weak and with ongoing periodic abdominal pain.  He continues to have hematuria by urinary assessment but not gross findings.  He also to continues to have periodic disequilibrium and unsteadiness and we will request neurologic assessment.  We have discussed that he may actually have a myeloproliferative disorder and requested that he undergo an assessment for JAK2 analysis today.    He is seen back 4/17/2023JAK  2-V617 F mutation having been detected.  In the interval he was admitted 2/19-21/23 for weakness, fall and ER evaluation not demonstrate any acute intracranial process, CT of lumbar spine with lumbar degenerative disease and other studies not show any acute abnormalities.  Fortunately he went on to improve though his wife had a positive COVID test recently on home examination.  His follow-up testing 4/10/2023 include an H&H of 18.3 and 60.9 white count of 14,700 platelet count of 477,000.    He is seen 4/17/2023 and we discussed that he is better served with phlebotomy at this point.      Plan:  *Neurology referral and follow-up    *Proceed with phlebotomy today    *Additional phlebotomy 2 weeks    *4 weeks MD or NP, phlebotomy, initiate Hydrea

## 2023-04-17 NOTE — NURSING NOTE
Per Dr. Lopez, pt to have phlebo today with 500cc removed and NS 250cc post phlebo. No need for CBC today per Dr. Lopez. CBC from 4/10/23 reviewed. Pt tolerated phlebo well. Instructed to force fluids, avoid etoh & heavy lifting x24hrs. Pt v/u. Pt discharged stable.

## 2023-04-18 ENCOUNTER — TELEPHONE (OUTPATIENT)
Dept: NEUROLOGY | Facility: CLINIC | Age: 78
End: 2023-04-18
Payer: MEDICARE

## 2023-04-18 ENCOUNTER — OFFICE VISIT (OUTPATIENT)
Dept: FAMILY MEDICINE CLINIC | Facility: CLINIC | Age: 78
End: 2023-04-18
Payer: MEDICARE

## 2023-04-18 VITALS
HEIGHT: 68 IN | SYSTOLIC BLOOD PRESSURE: 118 MMHG | DIASTOLIC BLOOD PRESSURE: 72 MMHG | WEIGHT: 173 LBS | TEMPERATURE: 97.8 F | RESPIRATION RATE: 16 BRPM | HEART RATE: 76 BPM | BODY MASS INDEX: 26.22 KG/M2

## 2023-04-18 DIAGNOSIS — G62.9 NEUROPATHY: Primary | ICD-10-CM

## 2023-04-18 PROCEDURE — 3078F DIAST BP <80 MM HG: CPT | Performed by: FAMILY MEDICINE

## 2023-04-18 PROCEDURE — 1160F RVW MEDS BY RX/DR IN RCRD: CPT | Performed by: FAMILY MEDICINE

## 2023-04-18 PROCEDURE — 99214 OFFICE O/P EST MOD 30 MIN: CPT | Performed by: FAMILY MEDICINE

## 2023-04-18 PROCEDURE — 3074F SYST BP LT 130 MM HG: CPT | Performed by: FAMILY MEDICINE

## 2023-04-18 PROCEDURE — 1159F MED LIST DOCD IN RCRD: CPT | Performed by: FAMILY MEDICINE

## 2023-04-18 RX ORDER — AMITRIPTYLINE HYDROCHLORIDE 10 MG/1
20 TABLET, FILM COATED ORAL NIGHTLY
Qty: 60 TABLET | Refills: 2 | Status: SHIPPED | OUTPATIENT
Start: 2023-04-18 | End: 2023-04-24

## 2023-04-18 NOTE — PROGRESS NOTES
"Subjective   Madhu Santoro is a 77 y.o. male.     CC: Management of Neuropathy/Edema    History of Present Illness     Patient returns today after seeing me initially on 4/6/2023 having some side effects of Lyrica, meaning swelling of the lower extremities.  This was stopped and his swelling was improving by the time he saw me however he did have a prescription for Lasix and potassium in short order.  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  He is following up with them next month.    Since being off his Lyrica, his feet have been \"on fire\" and really bothering him a lot. Pt having a great deal of difficulty sleeping due to the burning pain.       The following portions of the patient's history were reviewed and updated as appropriate: allergies, current medications, past family history, past medical history, past social history, past surgical history and problem list.    Review of Systems   Constitutional: Negative for activity change, chills and fever.   Respiratory: Negative for cough.    Cardiovascular: Negative for chest pain.   Psychiatric/Behavioral: Negative for dysphoric mood.       /72   Pulse 76   Temp 97.8 °F (36.6 °C) (Oral)   Resp 16   Ht 172.7 cm (67.99\")   Wt 78.5 kg (173 lb)   BMI 26.31 kg/m²     Objective   Physical Exam  Constitutional:       General: He is not in acute distress.     Appearance: He is well-developed.   Cardiovascular:      Rate and Rhythm: Normal rate and regular rhythm.   Pulmonary:      Effort: Pulmonary effort is normal.      Breath sounds: Normal breath sounds.   Neurological:      Mental Status: He is alert and oriented to person, place, and time.   Psychiatric:         Behavior: Behavior normal.         Thought Content: Thought content normal.         Assessment & Plan   Diagnoses and all orders for this visit:    1. Neuropathy (Primary)  Comments:  worsening, medication changed " at visit  Orders:  -     amitriptyline (ELAVIL) 10 MG tablet; Take 2 tablets by mouth Every Night.  Dispense: 60 tablet; Refill: 2    Pt to start with 1 QHS and increase in 7-14 days. Pt has appt with Neurology next week and is to discuss this with them, too. The Elavil should also help him sleep, and may potentially help with his overactive bladder.

## 2023-04-24 ENCOUNTER — OFFICE VISIT (OUTPATIENT)
Dept: NEUROLOGY | Facility: CLINIC | Age: 78
End: 2023-04-24
Payer: MEDICARE

## 2023-04-24 VITALS
DIASTOLIC BLOOD PRESSURE: 62 MMHG | SYSTOLIC BLOOD PRESSURE: 122 MMHG | HEART RATE: 72 BPM | WEIGHT: 171 LBS | BODY MASS INDEX: 25.91 KG/M2 | HEIGHT: 68 IN | OXYGEN SATURATION: 99 %

## 2023-04-24 DIAGNOSIS — G60.9 PERIPHERAL NEUROPATHY, IDIOPATHIC: Primary | ICD-10-CM

## 2023-04-24 PROCEDURE — 3074F SYST BP LT 130 MM HG: CPT | Performed by: PSYCHIATRY & NEUROLOGY

## 2023-04-24 PROCEDURE — 99214 OFFICE O/P EST MOD 30 MIN: CPT | Performed by: PSYCHIATRY & NEUROLOGY

## 2023-04-24 PROCEDURE — 3078F DIAST BP <80 MM HG: CPT | Performed by: PSYCHIATRY & NEUROLOGY

## 2023-04-24 PROCEDURE — 1159F MED LIST DOCD IN RCRD: CPT | Performed by: PSYCHIATRY & NEUROLOGY

## 2023-04-24 PROCEDURE — 1160F RVW MEDS BY RX/DR IN RCRD: CPT | Performed by: PSYCHIATRY & NEUROLOGY

## 2023-04-24 RX ORDER — AMITRIPTYLINE HYDROCHLORIDE 25 MG/1
25 TABLET, FILM COATED ORAL NIGHTLY
Qty: 30 TABLET | Refills: 5 | Status: SHIPPED | OUTPATIENT
Start: 2023-04-24 | End: 2023-05-24

## 2023-04-24 NOTE — PROGRESS NOTES
Chief complaint: Peripheral neuropathy    Patient ID: Madhu Santoro is a 77 y.o. male.    HPI: I had the pleasure of seeing your patient today.  He is a 69 gentleman is here for evaluation of symptoms of peripheral neuropathy.  The patient says that about 6 months to a year ago he started noticing some tingling sensations in his feet.  He says that it started to feel like he was walking on a hot stove.  It was a burning-like sensation.  He can feel it on the bottoms of both feet, predominantly in the toes and the ball of the feet bilaterally.  This has progressed to the experience of burning sensations in both feet with heaviness and some loss of sensation.  He denies history of burning or tingling of the hands however he has had some numbness in his fingertips.  This is predominantly the third fourth and fifth digits of both hands.  No trouble swallowing.  No gastric bloating.  No double vision.  No history of diabetes.  He denies a history of B12 deficiency however he was given a B12 injection about 2 weeks ago upon his request at his primary care physician's office.  No family history of peripheral neuropathies.  He was recently prescribed amitriptyline for his symptoms.  He is currently taking 10 mg nightly and is scheduled to go up to 20 mg nightly.  He has not had any significant improvement of his symptoms as yet.    The following portions of the patient's history were reviewed and updated as appropriate: allergies, current medications, past family history, past medical history, past social history, past surgical history and problem list.    Review of Systems   Cardiovascular: Positive for leg swelling. Negative for chest pain and palpitations.   Gastrointestinal: Negative for diarrhea, nausea and vomiting.   Endocrine: Negative for cold intolerance, heat intolerance and polydipsia.   Genitourinary: Negative for decreased urine volume, difficulty urinating and urgency.   Musculoskeletal: Positive for back  pain and gait problem.   Skin: Negative for color change, rash and wound.   Allergic/Immunologic: Negative for environmental allergies, food allergies and immunocompromised state.   Neurological: Positive for weakness and numbness (knees down both legs). Negative for dizziness, tremors, seizures, syncope, facial asymmetry, speech difficulty, light-headedness and headaches.   Hematological: Negative for adenopathy. Bruises/bleeds easily.   Psychiatric/Behavioral: Negative for confusion, decreased concentration and sleep disturbance.      I have reviewed the review of systems above performed by my medical assistant.      Vitals:    04/24/23 1458   BP: 122/62   Pulse: 72   SpO2: 99%       Neurologic Exam     Mental Status   Oriented to person, place, and time.   Registration: recalls 3 of 3 objects. Follows 3 step commands.   Attention: normal. Concentration: normal.   Speech: speech is normal   Level of consciousness: alert  Knowledge: consistent with education (No deficits found.).   Normal comprehension.     Cranial Nerves     CN II   Visual fields full to confrontation.     CN III, IV, VI   Pupils are equal, round, and reactive to light.  Extraocular motions are normal.   CN III: no CN III palsy  CN VI: no CN VI palsy  Nystagmus: none   Diplopia: none    CN V   Facial sensation intact.     CN VII   Facial expression full, symmetric.     CN VIII   CN VIII normal.     CN IX, X   CN IX normal.   CN X normal.     CN XI   CN XI normal.     CN XII   CN XII normal.     Motor Exam   Muscle bulk: normal  Right arm tone: normal  Left arm tone: normal  Right leg tone: normal  Left leg tone: normal    Strength   Right neck flexion: 5/5  Left neck flexion: 5/5  Right neck extension: 5/5  Left neck extension: 5/5  Right deltoid: 5/5  Left deltoid: 5/5  Right biceps: 5/5  Left biceps: 5/5  Right triceps: 5/5  Left triceps: 5/5  Right wrist flexion: 5/5  Left wrist flexion: 5/5  Right wrist extension: 5/5  Left wrist extension:  5/5  Right interossei: 5/5  Left interossei: 5/5  Right abdominals: 5/5  Left abdominals: 5/5  Right iliopsoas: 5/5  Left iliopsoas: 5/5  Right quadriceps: 5/5  Left quadriceps: 5/5  Right hamstrin/5  Left hamstrin/5  Right glutei: 5/5  Left glutei: 5/5  Right anterior tibial: 5/5  Left anterior tibial: 5/5  Right posterior tibial: 5/5  Left posterior tibial: 5/5  Right peroneal: 5/5  Left peroneal: 5/5  Right gastroc: 5/5  Left gastroc: 5/5    Sensory Exam   Right leg light touch: decreased from toes  Left leg light touch: decreased from toes  Right leg vibration: decreased from ankle  Left leg vibration: decreased from ankle  Proprioception normal.   Right leg pinprick: decreased from toes  Left leg pinprick: decreased from toes    Gait, Coordination, and Reflexes     Gait  Gait: normal    Coordination   Romberg: negative    Tremor   Resting tremor: absent  Intention tremor: absent    Reflexes   Right brachioradialis: 2+  Left brachioradialis: 2+  Right biceps: 2+  Left biceps: 2+  Right triceps: 2+  Left triceps: 2+  Right patellar: 2+  Left patellar: 2+  Right achilles: 2+  Left achilles: 2+  Right : 2+  Left : 2+Station is normal.       Physical Exam  Vitals reviewed.   Constitutional:       General: He is not in acute distress.     Appearance: He is well-developed.   HENT:      Head: Normocephalic and atraumatic.   Eyes:      Extraocular Movements: EOM normal.      Pupils: Pupils are equal, round, and reactive to light.   Cardiovascular:      Rate and Rhythm: Normal rate and regular rhythm.      Heart sounds: Normal heart sounds.   Pulmonary:      Effort: Pulmonary effort is normal. No respiratory distress.      Breath sounds: Normal breath sounds.   Abdominal:      General: Bowel sounds are normal. There is no distension.      Palpations: Abdomen is soft.      Tenderness: There is no abdominal tenderness.   Musculoskeletal:         General: No deformity.      Cervical back: Normal range of  motion.   Skin:     General: Skin is warm.      Findings: No rash.   Neurological:      Mental Status: He is oriented to person, place, and time.      Coordination: Romberg Test normal.      Gait: Gait is intact.      Deep Tendon Reflexes:      Reflex Scores:       Tricep reflexes are 2+ on the right side and 2+ on the left side.       Bicep reflexes are 2+ on the right side and 2+ on the left side.       Brachioradialis reflexes are 2+ on the right side and 2+ on the left side.       Patellar reflexes are 2+ on the right side and 2+ on the left side.       Achilles reflexes are 2+ on the right side and 2+ on the left side.  Psychiatric:         Speech: Speech normal.         Judgment: Judgment normal.         Procedures    Assessment/Plan: I would like to check peripheral neuropathy labs.  We will wait a couple of weeks since he just had a vitamin B12 injection.  Amitriptyline 25 mg nightly for neuropathic pain.  We will schedule an EMG/nerve conduction study of both lower extremities.  We will see him back after testing is been completed.         Diagnoses and all orders for this visit:    1. Peripheral neuropathy, idiopathic (Primary)  -     amitriptyline (ELAVIL) 25 MG tablet; Take 1 tablet by mouth Every Night for 30 days.  Dispense: 30 tablet; Refill: 5  -     Vitamin E  -     Vitamin B6  -     Vitamin B12  -     Vitamin B1, Whole Blood  -     Protein Elec + Interp, Serum  -     Immunofixation, Serum  -     Heavy Metals, Blood  -     Copper, Serum  -     Celiac Disease Antibody Screen  -     EMG & Nerve Conduction Test; Future           Mukesh Russell II, MD

## 2023-04-24 NOTE — LETTER
April 24, 2023       No Recipients    Patient: Madhu Santoro   YOB: 1945   Date of Visit: 4/24/2023       Dear Dr. Kauffman Recipients:    Thank you for referring Madhu Santoro to me for evaluation. Below are the relevant portions of my assessment and plan of care.    If you have questions, please do not hesitate to call me. I look forward to following Madhu along with you.         Sincerely,        Mukesh Russell II, MD        CC:   No Recipients    Mukesh uRssell II, MD  04/24/23 1624  Signed  Chief complaint: Peripheral neuropathy    Patient ID: Madhu Santoro is a 77 y.o. male.    HPI: I had the pleasure of seeing your patient today.  He is a 69 gentleman is here for evaluation of symptoms of peripheral neuropathy.  The patient says that about 6 months to a year ago he started noticing some tingling sensations in his feet.  He says that it started to feel like he was walking on a hot stove.  It was a burning-like sensation.  He can feel it on the bottoms of both feet, predominantly in the toes and the ball of the feet bilaterally.  This has progressed to the experience of burning sensations in both feet with heaviness and some loss of sensation.  He denies history of burning or tingling of the hands however he has had some numbness in his fingertips.  This is predominantly the third fourth and fifth digits of both hands.  No trouble swallowing.  No gastric bloating.  No double vision.  No history of diabetes.  He denies a history of B12 deficiency however he was given a B12 injection about 2 weeks ago upon his request at his primary care physician's office.  No family history of peripheral neuropathies.  He was recently prescribed amitriptyline for his symptoms.  He is currently taking 10 mg nightly and is scheduled to go up to 20 mg nightly.  He has not had any significant improvement of his symptoms as yet.    The following portions of the patient's history were reviewed and updated as  appropriate: allergies, current medications, past family history, past medical history, past social history, past surgical history and problem list.    Review of Systems   Cardiovascular: Positive for leg swelling. Negative for chest pain and palpitations.   Gastrointestinal: Negative for diarrhea, nausea and vomiting.   Endocrine: Negative for cold intolerance, heat intolerance and polydipsia.   Genitourinary: Negative for decreased urine volume, difficulty urinating and urgency.   Musculoskeletal: Positive for back pain and gait problem.   Skin: Negative for color change, rash and wound.   Allergic/Immunologic: Negative for environmental allergies, food allergies and immunocompromised state.   Neurological: Positive for weakness and numbness (knees down both legs). Negative for dizziness, tremors, seizures, syncope, facial asymmetry, speech difficulty, light-headedness and headaches.   Hematological: Negative for adenopathy. Bruises/bleeds easily.   Psychiatric/Behavioral: Negative for confusion, decreased concentration and sleep disturbance.      I have reviewed the review of systems above performed by my medical assistant.      Vitals:    04/24/23 1458   BP: 122/62   Pulse: 72   SpO2: 99%       Neurologic Exam     Mental Status   Oriented to person, place, and time.   Registration: recalls 3 of 3 objects. Follows 3 step commands.   Attention: normal. Concentration: normal.   Speech: speech is normal   Level of consciousness: alert  Knowledge: consistent with education (No deficits found.).   Normal comprehension.     Cranial Nerves     CN II   Visual fields full to confrontation.     CN III, IV, VI   Pupils are equal, round, and reactive to light.  Extraocular motions are normal.   CN III: no CN III palsy  CN VI: no CN VI palsy  Nystagmus: none   Diplopia: none    CN V   Facial sensation intact.     CN VII   Facial expression full, symmetric.     CN VIII   CN VIII normal.     CN IX, X   CN IX normal.   CN X  normal.     CN XI   CN XI normal.     CN XII   CN XII normal.     Motor Exam   Muscle bulk: normal  Right arm tone: normal  Left arm tone: normal  Right leg tone: normal  Left leg tone: normal    Strength   Right neck flexion: 5/5  Left neck flexion: 5/5  Right neck extension: 5/5  Left neck extension: 5/5  Right deltoid: 5/5  Left deltoid: 5/5  Right biceps: 5/5  Left biceps: 5/5  Right triceps: 5/5  Left triceps: 5/5  Right wrist flexion: 5/5  Left wrist flexion: 5/5  Right wrist extension: 5/5  Left wrist extension: 5/5  Right interossei: 5/5  Left interossei: 5/5  Right abdominals: 5/5  Left abdominals: 5/5  Right iliopsoas: 5/5  Left iliopsoas: 5/5  Right quadriceps: 5/5  Left quadriceps: 5/5  Right hamstrin/5  Left hamstrin/5  Right glutei: 5/5  Left glutei: 5/5  Right anterior tibial: 5/5  Left anterior tibial: 5/5  Right posterior tibial: 5/5  Left posterior tibial: 5/5  Right peroneal: 5/5  Left peroneal: 5/5  Right gastroc: 5/5  Left gastroc: 5/5    Sensory Exam   Right leg light touch: decreased from toes  Left leg light touch: decreased from toes  Right leg vibration: decreased from ankle  Left leg vibration: decreased from ankle  Proprioception normal.   Right leg pinprick: decreased from toes  Left leg pinprick: decreased from toes    Gait, Coordination, and Reflexes     Gait  Gait: normal    Coordination   Romberg: negative    Tremor   Resting tremor: absent  Intention tremor: absent    Reflexes   Right brachioradialis: 2+  Left brachioradialis: 2+  Right biceps: 2+  Left biceps: 2+  Right triceps: 2+  Left triceps: 2+  Right patellar: 2+  Left patellar: 2+  Right achilles: 2+  Left achilles: 2+  Right : 2+  Left : 2+Station is normal.       Physical Exam  Vitals reviewed.   Constitutional:       General: He is not in acute distress.     Appearance: He is well-developed.   HENT:      Head: Normocephalic and atraumatic.   Eyes:      Extraocular Movements: EOM normal.      Pupils: Pupils  are equal, round, and reactive to light.   Cardiovascular:      Rate and Rhythm: Normal rate and regular rhythm.      Heart sounds: Normal heart sounds.   Pulmonary:      Effort: Pulmonary effort is normal. No respiratory distress.      Breath sounds: Normal breath sounds.   Abdominal:      General: Bowel sounds are normal. There is no distension.      Palpations: Abdomen is soft.      Tenderness: There is no abdominal tenderness.   Musculoskeletal:         General: No deformity.      Cervical back: Normal range of motion.   Skin:     General: Skin is warm.      Findings: No rash.   Neurological:      Mental Status: He is oriented to person, place, and time.      Coordination: Romberg Test normal.      Gait: Gait is intact.      Deep Tendon Reflexes:      Reflex Scores:       Tricep reflexes are 2+ on the right side and 2+ on the left side.       Bicep reflexes are 2+ on the right side and 2+ on the left side.       Brachioradialis reflexes are 2+ on the right side and 2+ on the left side.       Patellar reflexes are 2+ on the right side and 2+ on the left side.       Achilles reflexes are 2+ on the right side and 2+ on the left side.  Psychiatric:         Speech: Speech normal.         Judgment: Judgment normal.         Procedures    Assessment/Plan: I would like to check peripheral neuropathy labs.  We will wait a couple of weeks since he just had a vitamin B12 injection.  Amitriptyline 25 mg nightly for neuropathic pain.  We will schedule an EMG/nerve conduction study of both lower extremities.  We will see him back after testing is been completed.        Diagnoses and all orders for this visit:    1. Peripheral neuropathy, idiopathic (Primary)  -     amitriptyline (ELAVIL) 25 MG tablet; Take 1 tablet by mouth Every Night for 30 days.  Dispense: 30 tablet; Refill: 5  -     Vitamin E  -     Vitamin B6  -     Vitamin B12  -     Vitamin B1, Whole Blood  -     Protein Elec + Interp, Serum  -     Immunofixation,  Serum  -     Heavy Metals, Blood  -     Copper, Serum  -     Celiac Disease Antibody Screen  -     EMG & Nerve Conduction Test; Future          Mukesh Russell II, MD

## 2023-04-26 ENCOUNTER — TELEPHONE (OUTPATIENT)
Dept: NEUROLOGY | Facility: CLINIC | Age: 78
End: 2023-04-26
Payer: MEDICARE

## 2023-04-27 ENCOUNTER — HOSPITAL ENCOUNTER (OUTPATIENT)
Dept: INFUSION THERAPY | Facility: HOSPITAL | Age: 78
Discharge: HOME | End: 2023-04-27
Admitting: PSYCHIATRY & NEUROLOGY
Payer: MEDICARE

## 2023-04-27 DIAGNOSIS — G60.9 PERIPHERAL NEUROPATHY, IDIOPATHIC: ICD-10-CM

## 2023-04-27 PROCEDURE — 95909 NRV CNDJ TST 5-6 STUDIES: CPT

## 2023-04-27 PROCEDURE — 95909 NRV CNDJ TST 5-6 STUDIES: CPT | Performed by: PSYCHIATRY & NEUROLOGY

## 2023-04-27 PROCEDURE — 95886 MUSC TEST DONE W/N TEST COMP: CPT | Performed by: PSYCHIATRY & NEUROLOGY

## 2023-04-27 PROCEDURE — 95886 MUSC TEST DONE W/N TEST COMP: CPT

## 2023-04-27 NOTE — PROCEDURES
EMG and Nerve Conduction Studies    I.      Instrument used: Neuromax 1002  II.     Please see data sheets for tabular summary of NCS and details on methods, temperatures and lab standards.   III.    EMG muscles tested for upper extremity studies include the deltoid, biceps, triceps, pronator teres, extensor digitorum communis, first dorsal interosseous and abductor pollicis brevis.    IV.   EMG muscles tested for lower extremity studies include the vastus lateralis, tibialis anterior, peroneus longus, medial gastrocnemius and extensor digitorum brevis.    V.    Additional muscles tested as needed.  Paraspinal muscles tested as needed.   VI.   Please see data sheets for tabular summary of EMG findings.   VII. The complete report includes the data sheets.      Indication: Numbness and pain in the feet  History: 77-year-old male with a 4-month history of numbness and pain in the feet.  He denies history of diabetes but a recent hemoglobin A1c was 6.3% 2/20/2023.  Denies history of thyroid disease.  He has some right-sided low back pain with some pain in the right hip radiating into the leg.    The patient is on Eliquis.      Ht: Not obtained  Wt: And 77.6 kg  HbA1C:   Lab Results   Component Value Date    HGBA1C 6.30 (H) 02/20/2023     TSH:   Lab Results   Component Value Date    TSH 1.830 02/19/2023       Technical summary:  Nerve conduction studies were obtained in the right leg with 1 comparison on the left.  Skin temperatures were 32 °C measured at the ankles.  Needle examination was obtained on selected muscles in both legs.    Results:  1.  Normal right sural sensory distal latency with low amplitude of 3 µV.  2.  Prolonged right superficial peroneal sensory distal latency at 4.8 ms with low amplitude of 4 µV.  Normal left superficial peroneal sensory distal latency with low amplitude of 4.3 µV.  3.  Slow right peroneal motor velocity below the knee at 33.7 m/s with a normal conduction velocity in the short  segment across the fibular head.  Normal distal latency with very low amplitude of 0.600 mV from ankle stimulation.  4.  Slow right tibial motor velocity at 35.3 m/s with a normal distal latency and amplitudes.  5.  Needle examination of selected muscles in both legs showed normal insertional activities throughout.  There was increased number of large motor units with increased firing rates and reduced interference patterns in all muscles tested below the knees.  The vastus lateralis muscles showed normal motor units and recruitment pattern.  Lumbar paraspinals were not studied since the patient takes Eliquis    Impression:  Abnormal study showing moderate to severe peripheral neuropathy.  The needle exam changes were consistent with the nerve conduction findings.  Study results were discussed with the patient.    Jann Zaragoza M.D.              Dictated utilizing Dragon dictation.

## 2023-05-01 ENCOUNTER — LAB (OUTPATIENT)
Dept: LAB | Facility: HOSPITAL | Age: 78
End: 2023-05-01
Payer: MEDICARE

## 2023-05-01 ENCOUNTER — INFUSION (OUTPATIENT)
Dept: ONCOLOGY | Facility: HOSPITAL | Age: 78
End: 2023-05-01
Payer: MEDICARE

## 2023-05-01 VITALS
BODY MASS INDEX: 25.8 KG/M2 | WEIGHT: 169.6 LBS | TEMPERATURE: 98 F | HEART RATE: 63 BPM | DIASTOLIC BLOOD PRESSURE: 65 MMHG | RESPIRATION RATE: 16 BRPM | SYSTOLIC BLOOD PRESSURE: 133 MMHG | OXYGEN SATURATION: 98 %

## 2023-05-01 DIAGNOSIS — D75.1 POLYCYTHEMIA: Primary | ICD-10-CM

## 2023-05-01 DIAGNOSIS — G89.29 CHRONIC PAIN OF BOTH HIPS: ICD-10-CM

## 2023-05-01 DIAGNOSIS — M54.50 CHRONIC BILATERAL LOW BACK PAIN WITHOUT SCIATICA: ICD-10-CM

## 2023-05-01 DIAGNOSIS — G89.29 CHRONIC BILATERAL LOW BACK PAIN WITHOUT SCIATICA: ICD-10-CM

## 2023-05-01 DIAGNOSIS — D75.1 POLYCYTHEMIA: ICD-10-CM

## 2023-05-01 DIAGNOSIS — M25.552 CHRONIC PAIN OF BOTH HIPS: ICD-10-CM

## 2023-05-01 DIAGNOSIS — M25.551 CHRONIC PAIN OF BOTH HIPS: ICD-10-CM

## 2023-05-01 LAB
BASOPHILS # BLD AUTO: 0.08 10*3/MM3 (ref 0–0.2)
BASOPHILS NFR BLD AUTO: 0.6 % (ref 0–1.5)
DEPRECATED RDW RBC AUTO: 55.8 FL (ref 37–54)
EOSINOPHIL # BLD AUTO: 0.25 10*3/MM3 (ref 0–0.4)
EOSINOPHIL NFR BLD AUTO: 2 % (ref 0.3–6.2)
ERYTHROCYTE [DISTWIDTH] IN BLOOD BY AUTOMATED COUNT: 22 % (ref 12.3–15.4)
HCT VFR BLD AUTO: 54 % (ref 37.5–51)
HGB BLD-MCNC: 16.5 G/DL (ref 13–17.7)
IMM GRANULOCYTES # BLD AUTO: 0.2 10*3/MM3 (ref 0–0.05)
IMM GRANULOCYTES NFR BLD AUTO: 1.6 % (ref 0–0.5)
LYMPHOCYTES # BLD AUTO: 1.51 10*3/MM3 (ref 0.7–3.1)
LYMPHOCYTES NFR BLD AUTO: 11.9 % (ref 19.6–45.3)
MCH RBC QN AUTO: 23.4 PG (ref 26.6–33)
MCHC RBC AUTO-ENTMCNC: 30.6 G/DL (ref 31.5–35.7)
MCV RBC AUTO: 76.7 FL (ref 79–97)
MONOCYTES # BLD AUTO: 1.66 10*3/MM3 (ref 0.1–0.9)
MONOCYTES NFR BLD AUTO: 13.1 % (ref 5–12)
NEUTROPHILS NFR BLD AUTO: 70.8 % (ref 42.7–76)
NEUTROPHILS NFR BLD AUTO: 8.99 10*3/MM3 (ref 1.7–7)
NRBC BLD AUTO-RTO: 0 /100 WBC (ref 0–0.2)
PLATELET # BLD AUTO: 439 10*3/MM3 (ref 140–450)
PMV BLD AUTO: 10.3 FL (ref 6–12)
RBC # BLD AUTO: 7.04 10*6/MM3 (ref 4.14–5.8)
WBC NRBC COR # BLD: 12.69 10*3/MM3 (ref 3.4–10.8)

## 2023-05-01 PROCEDURE — 99195 PHLEBOTOMY: CPT

## 2023-05-01 PROCEDURE — 85025 COMPLETE CBC W/AUTO DIFF WBC: CPT

## 2023-05-01 PROCEDURE — 36415 COLL VENOUS BLD VENIPUNCTURE: CPT

## 2023-05-01 RX ORDER — SODIUM CHLORIDE 9 MG/ML
250 INJECTION, SOLUTION INTRAVENOUS ONCE
OUTPATIENT
Start: 2023-05-01

## 2023-05-01 RX ORDER — HYDROCODONE BITARTRATE AND ACETAMINOPHEN 10; 325 MG/1; MG/1
1 TABLET ORAL EVERY 8 HOURS PRN
Qty: 75 TABLET | Refills: 0 | Status: SHIPPED | OUTPATIENT
Start: 2023-05-01

## 2023-05-01 RX ORDER — SODIUM CHLORIDE 9 MG/ML
250 INJECTION, SOLUTION INTRAVENOUS ONCE
Status: COMPLETED | OUTPATIENT
Start: 2023-05-01 | End: 2023-05-01

## 2023-05-01 RX ADMIN — SODIUM CHLORIDE 250 ML: 9 INJECTION, SOLUTION INTRAVENOUS at 15:28

## 2023-05-01 NOTE — TELEPHONE ENCOUNTER
Medication Refill Request    Date of phone call: 23    Medication being requested: Norco  mg   si tab po q 8 hrs prn  Qty: 75    Date of last visit: 23    Date of last refill:     RUTH up to date?:     Next Follow up?: 23    Any new pertinent information? (i.e, new medication allergies, new use of medications, change in patient's health or condition, non-compliance or inconsistency with prescribing agreement?):

## 2023-05-01 NOTE — TELEPHONE ENCOUNTER
Reviewed UDS and RUTH (scanned into chart). Both updated and appropriate. Refill appropriate.  DNF 5/5/23.

## 2023-05-15 ENCOUNTER — TELEPHONE (OUTPATIENT)
Dept: GASTROENTEROLOGY | Facility: CLINIC | Age: 78
End: 2023-05-15
Payer: MEDICARE

## 2023-05-15 ENCOUNTER — APPOINTMENT (OUTPATIENT)
Dept: ONCOLOGY | Facility: HOSPITAL | Age: 78
End: 2023-05-15
Payer: MEDICARE

## 2023-05-15 ENCOUNTER — SPECIALTY PHARMACY (OUTPATIENT)
Dept: PHARMACY | Facility: HOSPITAL | Age: 78
End: 2023-05-15
Payer: MEDICARE

## 2023-05-15 ENCOUNTER — LAB (OUTPATIENT)
Dept: LAB | Facility: HOSPITAL | Age: 78
End: 2023-05-15
Payer: MEDICARE

## 2023-05-15 ENCOUNTER — OFFICE VISIT (OUTPATIENT)
Dept: ONCOLOGY | Facility: CLINIC | Age: 78
End: 2023-05-15
Payer: MEDICARE

## 2023-05-15 VITALS
OXYGEN SATURATION: 98 % | DIASTOLIC BLOOD PRESSURE: 77 MMHG | HEART RATE: 79 BPM | SYSTOLIC BLOOD PRESSURE: 135 MMHG | TEMPERATURE: 98.2 F | WEIGHT: 166.1 LBS | BODY MASS INDEX: 25.17 KG/M2 | HEIGHT: 68 IN

## 2023-05-15 DIAGNOSIS — D45 POLYCYTHEMIA VERA: Primary | ICD-10-CM

## 2023-05-15 DIAGNOSIS — D75.839 THROMBOCYTOSIS: ICD-10-CM

## 2023-05-15 DIAGNOSIS — D45 POLYCYTHEMIA VERA: ICD-10-CM

## 2023-05-15 LAB
BASOPHILS # BLD AUTO: 0.05 10*3/MM3 (ref 0–0.2)
BASOPHILS NFR BLD AUTO: 0.4 % (ref 0–1.5)
DEPRECATED RDW RBC AUTO: 53.2 FL (ref 37–54)
EOSINOPHIL # BLD AUTO: 0.27 10*3/MM3 (ref 0–0.4)
EOSINOPHIL NFR BLD AUTO: 2.2 % (ref 0.3–6.2)
ERYTHROCYTE [DISTWIDTH] IN BLOOD BY AUTOMATED COUNT: 21 % (ref 12.3–15.4)
HCT VFR BLD AUTO: 50.4 % (ref 37.5–51)
HGB BLD-MCNC: 15.3 G/DL (ref 13–17.7)
IMM GRANULOCYTES # BLD AUTO: 0.16 10*3/MM3 (ref 0–0.05)
IMM GRANULOCYTES NFR BLD AUTO: 1.3 % (ref 0–0.5)
LYMPHOCYTES # BLD AUTO: 1.43 10*3/MM3 (ref 0.7–3.1)
LYMPHOCYTES NFR BLD AUTO: 11.6 % (ref 19.6–45.3)
MCH RBC QN AUTO: 23.1 PG (ref 26.6–33)
MCHC RBC AUTO-ENTMCNC: 30.4 G/DL (ref 31.5–35.7)
MCV RBC AUTO: 76.1 FL (ref 79–97)
MONOCYTES # BLD AUTO: 1.64 10*3/MM3 (ref 0.1–0.9)
MONOCYTES NFR BLD AUTO: 13.3 % (ref 5–12)
NEUTROPHILS NFR BLD AUTO: 71.2 % (ref 42.7–76)
NEUTROPHILS NFR BLD AUTO: 8.82 10*3/MM3 (ref 1.7–7)
NRBC BLD AUTO-RTO: 0 /100 WBC (ref 0–0.2)
PLATELET # BLD AUTO: 469 10*3/MM3 (ref 140–450)
PMV BLD AUTO: 10.1 FL (ref 6–12)
RBC # BLD AUTO: 6.62 10*6/MM3 (ref 4.14–5.8)
WBC NRBC COR # BLD: 12.37 10*3/MM3 (ref 3.4–10.8)

## 2023-05-15 PROCEDURE — 85025 COMPLETE CBC W/AUTO DIFF WBC: CPT

## 2023-05-15 PROCEDURE — 36415 COLL VENOUS BLD VENIPUNCTURE: CPT

## 2023-05-15 NOTE — PROGRESS NOTES
Hydrea is covered through insurance for $14.   [facility] is preferred pharmacy.  Can be filled at  [facility] if pt wants.   Alanis Cuba  Specialty Pharmacy Technician

## 2023-05-15 NOTE — PROGRESS NOTES
Subjective     REASON FOR FOLLOW-UP: Thrombocytosis secondary to iron deficiency anemia      HISTORY OF PRESENT ILLNESS:  Mr. Santoro is a 77-year-old male with the above-mentioned history who is here today for lab review and evaluation and possible phlebotomy.  Labs have improved today, hemoglobin 15.3, hematocrit 50.4, WBC 12.37, platelet count 469,000.  I reviewed his labs with Dr. Lopez, and we discussed today recommend he start Hydrea 1000 mg daily to hopefully reduce the need for phlebotomy.    Patient did undergo EMG seen by Dr. Zaragoza which found him to have moderate to severe peripheral neuropathy.  He is complaining of ongoing issues with pain in his lower extremities, which is his biggest complaint.  I recommend he discuss medication management for that with his primary care provider, pain management, or neurology.  He states he had previously taken Lyrica and did not tolerate it.      Hematologic/oncologic history:     The patient is a 77-year-old male followed with a number of issues including coronary artery disease, status post CABG, atrial flutter, previous CVA hyperlipidemia, GE reflux, carotid vascular disease, and hypertension.     He had been seen by vascular 2/25/2022 with mild progression of carotid disease but otherwise stable and also had follow-up with pain management for back pain 3/28/2022 requiring chronic narcotic therapy.  Patient has been resistant to epidural like procedures.     Unfortunately has had concurrent constipation requiring laxatives and additional opioid antagonist to reduce GI hypomotility.  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     Patient recently reviewed again by pain management with worsening pain.  Patient also saw GI periodically undergoing a follow-up MRI of the abdomen 8/9/2022 with scattered  pancreatic cystic lesions unchanged from previous.  He had previously undergone EGD and colonoscopy 11/5/2021 with irregular Z-line and biopsies taken in nonbleeding internal hemorrhoids found during retroflexion that were small.  There are scattered small and large mouth diverticula found in the sigmoid colon descending colon and splenic flexure.       The patient now presents for thrombocytosis with review of his record over the last year demonstrating a platelet count that is escalated from 3-400,000 now to 8/11/2022 648,000 but concurrent microcytic and hypochromic indices.  These indices have been slowly reducing over the last 2 years approximately followed more closely.        These findings are discussed with the patient 8/16/2022 who indicates that he actually feels about the same though still has issues with back pain.  He is noted no change in bowel habit including, fortunately, improvement of his constipation without the use of additional medication such as Movantik.  He has not noted any change in the color of his stool including dark stools or any blood per rectum, urine though he does note periodic excess bruising from his anticoagulation therapy.       The patient moved to a trial of iron which, unfortunately, worsened his constipation in which he had discontinue. He had further issues in early September with left renal stone, requiring cystoscopy, stent placement 8/23/2022.       He is next seen back 9/28/2022 with repeat studies performed 9/21 demonstrating 5% iron saturation, ferritin of 12.5.  He remains further weight and fatigue, again oral iron intolerant and will require IV iron preparations for his iron deficiency anemia.      The patient is next seen 1/18/2023 with considerable improvement in his testing including H&H now 17.2 and 55.4 though with iron saturation of 8% and ferritin 26.5.  He has not noted any blood loss but remains generally weak and with ongoing periodic abdominal pain.   His major concerns continue to be a disequilibrium since his previous CVA symptoms.  He has not been seen in follow-up by neurology.  He continues to have hematuria by urinary assessment but not gross findings.  We have discussed that he may actually have a myeloproliferative disorder and requested that he undergo an assessment for JAK2 analysis today.    He is seen back 4/17/2023JAK  2-V617 F mutation having been detected.  In the interval he was admitted 2/19-21/23 for weakness, fall and ER evaluation not demonstrate any acute intracranial process, CT of lumbar spine with lumbar degenerative disease and other studies not show any acute abnormalities.  Fortunately he went on to improve though his wife had a positive COVID test recently on home examination.  His follow-up testing 4/10/2023 include an H&H of 18.3 and 60.9 white count of 14,700 platelet count of 477,000.    He is seen 4/17/2023 and we discussed that he is better served with phlebotomy at this point.  He states he feels so poorly that he is quite willing to try to move to additional therapies including phlebotomy.      Past Medical History:   Diagnosis Date   • Anxiety    • Arthritis    • Atrial flutter     Status post cavotricuspid isthmus ablation by Dr. Gustafson on 4/18/17   • Mandel esophagus    • Benign essential hypertension    • CAD (coronary artery disease)     3 vessel CABG 4/11/17 by Dr. Cortez: ROSARIO-prox LAD, SVG-OM1, SVG-OM3   • Carotid artery disease     Status post carotid endarterectomy - USG 4/10/17: 50-59% NICHELLE, 1-15% LICA.    • Colonic polyp    • Cyst of pancreas    • DDD (degenerative disc disease), lumbosacral    • GERD (gastroesophageal reflux disease)    • H/O bone density study 2013   • H/O complete eye exam 2014   • History of kidney stone    • HLD (hyperlipidemia)    • Hypertension    • Kidney stone     8/22/22   • Lipid screening 05/31/2013   • Low back pain     [facility]   • Screening for  "prostate cancer 07/07/2015   • Skin cancer     nose   • Stroke     RESIDUAL--\"BALANCE ISSUES\"        Past Surgical History:   Procedure Laterality Date   • CARDIAC CATHETERIZATION N/A 04/10/2017    Procedure: Left Heart Cath;  Surgeon: Marjorie Healy MD;  Location: [facility] CATH INVASIVE LOCATION;  Service:    • CARDIAC CATHETERIZATION N/A 04/10/2017    Procedure: Coronary angiography;  Surgeon: Marjorie Healy MD;  Location: [facility] CATH INVASIVE LOCATION;  Service:    • CARDIAC CATHETERIZATION N/A 04/10/2017    Procedure: Left ventriculography;  Surgeon: Marjorie Healy MD;  Location: [facility] CATH INVASIVE LOCATION;  Service:    • CARDIAC CATHETERIZATION  2011   • CARDIAC ELECTROPHYSIOLOGY PROCEDURE N/A 04/18/2017    Procedure: Ablation atrial flutter;  Surgeon: Jose Antonio Gustafson MD;  Location: [facility] CATH INVASIVE LOCATION;  Service:    • CAROTID ENDARTERECTOMY     • CHOLECYSTECTOMY     • CHOLECYSTECTOMY WITH INTRAOPERATIVE CHOLANGIOGRAM N/A 09/07/2019    Procedure: Laparoscopic cholecystectomy with intraoperative cholangiogram;  Surgeon: Gauri Travis MD;  Location: [facility] MAIN OR;  Service: General   • COLONOSCOPY  01/06/2015    Diverticulosis, one TA   • COLONOSCOPY N/A 02/14/2019    tics, NBIH, adenomatous polyp x 2   • COLONOSCOPY N/A 11/05/2021    Procedure: COLONOSCOPY TO CECUM AND TERM. ILEUM WITH COLD POLYPECTOMIES;  Surgeon: Everton Abel MD;  Location: [facility] ENDOSCOPY;  Service: Gastroenterology;  Laterality: N/A;  PRE OP - PERS H/O POLYPS  POST OP - COLON POLYPS,, DIVERTICULOSIS, HEMORRHOIDS   • CORONARY ARTERY BYPASS GRAFT N/A 04/11/2017    Procedure: AR STERNOTOMY CORONARY ARTERY BYPASS GRAFT TIMES 3 USING LEFT INTERNAL MAMMARY ARTERY AND LEFT GREATER SAPHENOUS VEIN GRAFT PER ENDOSCOPIC VEIN HARVESTING AND PRP ;  Surgeon: Temo Cortez MD;  Location: [facility] MAIN OR;  Service:    • ENDOSCOPY  01/06/2015    HH, Mandel's esophagus   • ENDOSCOPY N/A 02/14/2019    Z line irregular, HH, " Ervin's esophagus   • ENDOSCOPY N/A 11/05/2021    Procedure: ESOPHAGOGASTRODUODENOSCOPY WITH BIOPSIES;  Surgeon: Everton Abel MD;  Location: [facility] ENDOSCOPY;  Service: Gastroenterology;  Laterality: N/A;  PRE OP - PERS H/O ERVIN'S  POST OP - IRREG Z LINE   • KNEE SURGERY Left    • URETEROSCOPY LASER LITHOTRIPSY WITH STENT INSERTION Left 8/23/2022    Procedure: Cysto retrograde with left uretro stent placement;  Surgeon: Damien Oliveira MD;  Location: [facility] MAIN OR;  Service: Urology;  Laterality: Left;   • VASECTOMY          Current Outpatient Medications on File Prior to Visit   Medication Sig Dispense Refill   • allopurinol (Zyloprim) 300 MG tablet Take 1 tablet by mouth Daily. 90 tablet 1   • amitriptyline (ELAVIL) 25 MG tablet Take 1 tablet by mouth Every Night for 30 days. 30 tablet 5   • amLODIPine (NORVASC) 2.5 MG tablet Take 1 tablet by mouth Daily. 90 tablet 3   • apixaban (ELIQUIS) 5 MG tablet tablet Take 1 tablet by mouth 2 (Two) Times a Day. 90 tablet 3   • furosemide (LASIX) 20 MG tablet TAKE ONE TABLET BY MOUTH DAILY 7 tablet 1   • HYDROcodone-acetaminophen (NORCO)  MG per tablet Take 1 tablet by mouth Every 8 (Eight) Hours As Needed for Severe Pain. 30 day supply 75 tablet 0   • mupirocin (BACTROBAN) 2 % cream Apply 1 application topically to the appropriate area as directed 2 (Two) Times a Day. 15 g 0   • Myrbetriq 25 MG tablet sustained-release 24 hour 24 hr tablet Take 1 tablet by mouth Daily.     • pantoprazole (PROTONIX) 40 MG EC tablet Take 1 tablet by mouth Daily. 90 tablet 1   • potassium chloride 10 MEQ CR tablet TAKE ONE TABLET BY MOUTH DAILY 7 tablet 1   • ramipril (ALTACE) 5 MG capsule Take 1 capsule by mouth Daily. 90 capsule 1   • rosuvastatin (Crestor) 20 MG tablet Take 1 tablet by mouth Daily. 90 tablet 1     Current Facility-Administered Medications on File Prior to Visit   Medication Dose Route Frequency Provider Last Rate Last Admin   • cyanocobalamin injection  1,000 mcg  1,000 mcg Intramuscular Q30 Days Damian Sanchez MD   1,000 mcg at 23 0957        ALLERGIES:    Allergies   Allergen Reactions   • Atorvastatin Myalgia     Myalgia   • Penicillins Hives, Swelling and Rash     Tolerates cephalosporins         Social History     Socioeconomic History   • Marital status:      Spouse name: Brooke   • Years of education: 9th grade   Tobacco Use   • Smoking status: Former     Packs/day: 1.00     Types: Cigarettes     Start date: 1959     Quit date: 2009     Years since quittin.3   • Smokeless tobacco: Never   • Tobacco comments:     CAFFEINE USE   Vaping Use   • Vaping Use: Never used   Substance and Sexual Activity   • Alcohol use: Not Currently     Comment: rarely   • Drug use: No   • Sexual activity: Defer     Partners: Female        Family History   Problem Relation Age of Onset   • Hypertension Mother    • Heart disease Mother    • Heart attack Mother    • Stroke Mother    • Heart disease Father    • Heart attack Father    • Stroke Father    • Hypertension Sister    • Heart attack Brother    • Heart disease Brother    • No Known Problems Brother    • Heart disease Brother    • Heart attack Brother    • Diabetes Brother    • No Known Problems Maternal Grandmother    • No Known Problems Maternal Grandfather    • No Known Problems Paternal Grandmother    • No Known Problems Paternal Grandfather    • Pancreatic cancer Paternal Cousin    • Arthritis Other    • Diabetes Other    • Hypertension Other    • Kidney disease Other         stones   • Malig Hyperthermia Neg Hx         Review of Systems   Constitutional: Positive for fatigue. Negative for activity change, appetite change and unexpected weight change.   HENT: Positive for dental problem (Edentulous, mouth irritation from dentures).    Eyes: Negative.    Respiratory: Negative.    Cardiovascular: Negative.    Gastrointestinal: Negative.    Endocrine: Negative.    Genitourinary: Positive for flank  "pain.   Musculoskeletal: Positive for arthralgias and back pain.   Skin: Positive for pallor.   Neurological: Positive for dizziness (Unstable gait and ambulation after previous CVA) and weakness.        Objective     Vitals:    05/15/23 1356   BP: 135/77   Pulse: 79   Temp: 98.2 °F (36.8 °C)   TempSrc: Temporal   SpO2: 98%   Weight: 75.3 kg (166 lb 1.6 oz)   Height: 172.7 cm (67.99\")   PainSc: 10-Worst pain ever   PainLoc: Foot         5/15/2023     1:55 PM   Current Status   ECOG score 0     Physical Exam  Vitals reviewed.   Constitutional:       General: He is not in acute distress.     Appearance: Normal appearance. He is well-developed.   HENT:      Head: Normocephalic and atraumatic.   Eyes:      Pupils: Pupils are equal, round, and reactive to light.   Cardiovascular:      Rate and Rhythm: Normal rate and regular rhythm.      Heart sounds: Normal heart sounds. No murmur heard.  Pulmonary:      Effort: Pulmonary effort is normal. No respiratory distress.      Breath sounds: Normal breath sounds. No wheezing, rhonchi or rales.   Abdominal:      General: Bowel sounds are normal. There is no distension.      Palpations: Abdomen is soft.   Musculoskeletal:         General: Normal range of motion.      Cervical back: Normal range of motion.   Skin:     General: Skin is warm and dry.      Findings: No rash.   Neurological:      Mental Status: He is alert and oriented to person, place, and time.         RECENT LABS:  Hematology WBC   Date Value Ref Range Status   05/15/2023 12.37 (H) 3.40 - 10.80 10*3/mm3 Final   01/06/2023 8.83 3.40 - 10.80 10*3/mm3 Final     RBC   Date Value Ref Range Status   05/15/2023 6.62 (H) 4.14 - 5.80 10*6/mm3 Final   01/06/2023 6.85 (H) 4.14 - 5.80 10*6/mm3 Final     Hemoglobin   Date Value Ref Range Status   05/15/2023 15.3 13.0 - 17.7 g/dL Final     Hematocrit   Date Value Ref Range Status   05/15/2023 50.4 37.5 - 51.0 % Final     Platelets   Date Value Ref Range Status   05/15/2023 469 " (H) 140 - 450 10*3/mm3 Final          Assessment & Plan      77-year-old male with medical issues including coronary artery disease, status post CABG, atrial flutter status post ablation, previous CVA, hyperlipidemia, GE reflux, cardiovascular disease and hypertension.  He also has chronic back pain followed by pain management requiring chronic narcotic therapy.  His medical issues per tickly cardiovascular and CVA led to eventual placement on aspirin and Eliquis chronically.  He has additionally been seen by GI including review of scattered cystic lesions in the pancreas and EGD colonoscopy as recently as November 2021.      The patient now presents for thrombocytosis with review of his record over the last year demonstrating a platelet count that is escalated from 3-400,000 now to 8/11/2022 648,000 but concurrent microcytic and hypochromic indices.  These indices have been slowly reducing over the last 2 years approximately followed more closely.  Concurrently is a mild drop in his baseline hemoglobin still well within normal limits.    These issues are discussed with the patient in some detail 8/16/2022 with this thrombocytosis thought, initially, to be related to iron deficiency.  This issupported during his visit with ferritin found to be 16.3 and iron of 26 with 5% saturation and TIBC 511.  We should first moved to replace his iron stores with his platelet count likely to reflect this reconstitution.  If he has continued evidence of iron deficiency despite this or is intolerant to oral iron (noting his history with constipation) then we would need to move to intravenous iron therapy and possibly further GI assessment with capsule endoscopy.  The patient was placed on ferrous gluconate which, unfortunately, he was unable to tolerate with worsening constipation.      He had further issues in early September with left renal stone, requiring cystoscopy, stent placement 8/23/2022.    He is next seen back 9/28/2022  with repeat studies performed 9/21 demonstrating 5% iron saturation, ferritin of 12.5.  He remains further weight and fatigue, again oral iron intolerant and will require IV iron preparations for his iron deficiency anemia.    We discontinued oral iron and proceeded with Injectafer given 9/28/2022 in 10/5/2022     He is reassessed 1/18/2023 with follow-up CBC including H&H of 17.2 and 55.4, white count of 10,650 and platelet count of 470,000, iron of 38 with a percent saturation and ferritin of 26.5.     He has not noted any blood loss but remains generally weak and with ongoing periodic abdominal pain.  He continues to have hematuria by urinary assessment but not gross findings.  He also to continues to have periodic disequilibrium and unsteadiness and we will request neurologic assessment.  We have discussed that he may actually have a myeloproliferative disorder and requested that he undergo an assessment for JAK2 analysis today.    He is seen back 4/17/2023JAK  2-V617 F mutation having been detected.  In the interval he was admitted 2/19-21/23 for weakness, fall and ER evaluation not demonstrate any acute intracranial process, CT of lumbar spine with lumbar degenerative disease and other studies not show any acute abnormalities.  Fortunately he went on to improve though his wife had a positive COVID test recently on home examination.  His follow-up testing 4/10/2023 include an H&H of 18.3 and 60.9 white count of 14,700 platelet count of 477,000.    He is seen 4/17/2023 and we discussed that he is better served with phlebotomy at this point.    5/15/2023 hemoglobin 15.3, hematocrit 50.4.  Reviewed with Dr. Lopez plans to hold off on phlebotomy today and initiate Hydrea 1000 mg daily.  We did briefly discussed the medication.  We will tentatively schedule him for return follow-up visit in 2 weeks with repeat labs and reassessment.      *Moderate to severe peripheral neuropathy: Seen by neurology, EMG nerve  conduction done 4/27/2023 showing moderate to severe peripheral neuropathy.  I recommend the patient follow-up with neurology, pain management, or PCP to further discuss management of this.  Madhu Santoro reports a pain score of 10.  Given his pain assessment as noted, treatment options were discussed and the following options were decided upon as a follow-up plan to address the patient's pain: referral to specialist for assistance in pain treatment guidance.      PLAN:  1. No phlebotomy today.  2. Patient to initiate Hydrea 1000 mg daily.  3. Return in 2 weeks for follow-up with MD or NP with repeat CBC, CMP, and assessment of tolerance.      The above plan of care was discussed with Dr. Lopez who is in agreement.    Marisol Buckner, APRN  05/15/2023

## 2023-05-15 NOTE — TELEPHONE ENCOUNTER
----- Message from MY Cano sent at 2/21/2023  3:03 PM EST -----  Please inform the patient that MRCP shows stable pancreatic cyst recommendations are to repeat in 1 year.

## 2023-05-15 NOTE — TELEPHONE ENCOUNTER
Sent pt MyChart msg advising of results. Advised to call if any questions.     Order previously placed for MRCP in one year.

## 2023-05-16 ENCOUNTER — SPECIALTY PHARMACY (OUTPATIENT)
Dept: PHARMACY | Facility: HOSPITAL | Age: 78
End: 2023-05-16
Payer: MEDICARE

## 2023-05-18 ENCOUNTER — SPECIALTY PHARMACY (OUTPATIENT)
Dept: PHARMACY | Facility: HOSPITAL | Age: 78
End: 2023-05-18
Payer: MEDICARE

## 2023-05-18 ENCOUNTER — OFFICE VISIT (OUTPATIENT)
Dept: ONCOLOGY | Facility: CLINIC | Age: 78
End: 2023-05-18
Payer: MEDICARE

## 2023-05-18 ENCOUNTER — SPECIALTY PHARMACY (OUTPATIENT)
Dept: ONCOLOGY | Facility: HOSPITAL | Age: 78
End: 2023-05-18
Payer: MEDICARE

## 2023-05-18 VITALS
SYSTOLIC BLOOD PRESSURE: 176 MMHG | BODY MASS INDEX: 25.75 KG/M2 | TEMPERATURE: 98.4 F | DIASTOLIC BLOOD PRESSURE: 82 MMHG | HEIGHT: 68 IN | WEIGHT: 169.9 LBS | RESPIRATION RATE: 18 BRPM | HEART RATE: 63 BPM | OXYGEN SATURATION: 98 %

## 2023-05-18 DIAGNOSIS — D45 POLYCYTHEMIA VERA: Primary | ICD-10-CM

## 2023-05-18 DIAGNOSIS — D75.839 THROMBOCYTOSIS: Primary | ICD-10-CM

## 2023-05-18 RX ORDER — HYDROXYUREA 500 MG/1
1000 CAPSULE ORAL DAILY
Qty: 60 CAPSULE | Refills: 5 | Status: SHIPPED | OUTPATIENT
Start: 2023-05-29

## 2023-05-18 NOTE — PROGRESS NOTES
[facility] and they are able to get the medication in stock.    Alanis Cuba  Specialty Pharmacy Technician

## 2023-05-18 NOTE — PROGRESS NOTES
TREATMENT  PREPARATION    Madhu Santoro  [id number]  1945    Chief Complaint: Treatment preparation and needs assessment    History of present illness:  Madhu Santoro is a 77 y.o. year old male who is here today for treatment preparation and needs assessment.  The patient has been diagnosed with   Encounter Diagnosis   Name Primary?   • Polycythemia vera Yes    and is scheduled to begin treatment with:     Oncology History:    Oncology/Hematology History    No history exists.       The current medication list and allergy list were reviewed and reconciled.     Past Medical History, Past Surgical History, Social History, Family History have been reviewed and are without significant changes except as mentioned.    Physical Exam:    Vitals:    05/18/23 0801   BP: 176/82   Pulse: 63   Resp: 18   Temp: 98.4 °F (36.9 °C)   SpO2: 98%     Vitals:    05/18/23 0801   PainSc:   8   PainLoc: Back  Comment: neuropathy        ECOG score: 0             Physical Exam  HENT:      Head: Normocephalic and atraumatic.   Eyes:      Extraocular Movements: Extraocular movements intact.      Conjunctiva/sclera: Conjunctivae normal.   Pulmonary:      Effort: Pulmonary effort is normal. No respiratory distress.   Neurological:      General: No focal deficit present.      Mental Status: He is alert and oriented to person, place, and time.   Psychiatric:         Mood and Affect: Mood normal.         Behavior: Behavior normal.           NEEDS ASSESSMENTS    Genetics  The patient's new diagnosis and family history have been reviewed for genetic counseling needs. The patient will not be referred..     Psychosocial and Barriers to care  The patient has completed a PHQ-9 Depression Screening and the Distress Thermometer (DT) today.  PHQ-9 results show PHQ-2 Total Score: 0 PHQ-9 Total Score: PHQ-9 Total Score: 0     The patient scored their distress today as Distress Level: 5 on a scale of 0-10 with 0 being no distress and 10 being extreme  distress. Problems marked by the patient as being an issue for them within the last week include Physical concerns  Pain: Yes .      Results were reviewed along with psychosocial resources offered by our cancer center.  Our Supportive Oncology team will be flagged for a score of 4 or above, and a same day call will be made for a score of 9 or 10.  A mental health referral is offered at that time. Patients who score less than 4 have been educated on our support services and can be referred to our  upon request.  The patient will not be referred to our .       Nutrition  The patient has completed the malnutrition screening today. They scored Malnutrition Screening Tool  Have you recently lost weight without trying?  If yes, how much weight have you lost?: 0--> No  Have you been eating poorly because of a decreased appetite?: 0--> No  MST score: 0   with a score of 0-1 meaning not at risk in a score of 2 or greater meaning at risk.  Patients with a score of 3 or higher will be referred to our oncology dietitian for support. Patients beginning at risk treatment regimens or who have dietary concerns will also be referred to our oncology dietitian. The patient will not be referred.    Functional Assessment  Persons who are age 70 or greater will be screened for qualification of a comprehensive geriatric assessment by our survivorship nurse practitioner.  Older adults with cancer face unique challenges. These may include an increased risk of drug reactions, financial burdens, and caregiver stress. The patient scored G8 Questionnaire  Has food intake declined over the past 3 months due to loss of appetite, digestive problems, chewing or swallowing difficulties?: No decrease in food intake  Weight loss during the last 3 months: No weight loss  Mobility: Goes out  Neuropsychological Problems: No psychological problems  Body Mass Index (BMI (weight in kg) / (height in m2)): BMI 23 and > 23  Takes more  "than 3 medications a day: Yes, takes more than 3 prescription drugs per day  In comparison with other people of the same age, how does the patient consider his/her health status?: Not as good  Age: < 80  Total Score: 14 . Patients scoring 14 or lower will referred for an older adult functional assessment with the survivorship advanced practice registered nurse to ensure all needed support is provided as patients plan for their treatments. NOT APPLICABLE    Intravenous Access Assessment  The patient and I discussed planned intravenous chemo/biotherapy as well as other IV treatments that are often needed throughout the course of treatment. These may include, but are not limited to blood transfusions, antibiotics, and IV hydration. Discussed that depending on selected treatment and vein assessment, patient may require venous access device (VAD) which could include but not limited to a Mediport or PICC line. Risks and benefits of VADs reviewed. The patient will be treated via Oral Treatment.    Reproductive/Sexual Activity   People should avoid becoming pregnant and should not get a partner pregnant while undergoing chemo/biotherapy.  People of childbearing age should use effective contraception during active therapy. The best recommendation for all people is to use a barrier method for a minimum of 1 week after the last infusion of chemo/biotherapy to prevent your partner being exposed to byproducts from treatment medications in bodily fluids. Effective contraception should be discussed with your oncology team to make sure it is safe to take based on your diagnosis. Possible options include oral contraceptives, barrier methods. Chemo/biotherapy can change your ability to reproduce children in the future.  There are options for fertility preservation. NOT APPLICABLE    Advanced Care Planning  Advance Care Planning   The patient and I discussed advanced care planning, \"Conversations that Matter\".   This service is " offered for development of advance directives with a certified ACP facilitator.  The patient does have an up-to-date advanced directive. This document is on file with our office. The patient is not interested in an appointment with one of our facilitators to create or update their advanced directives.    Have you reviewed your Advance Directive and is it valid for this stay?: Yes  Patient Requests Assistance on Advance Directives: Patient Declined          Smoking cessation  Tobacco Use: Medium Risk   • Smoking Tobacco Use: Former   • Smokeless Tobacco Use: Never   • Passive Exposure: Not on file       Patient and I discussed their tobacco use history. Referral will not be made for smoking cessation.      Palliative Care  When appropriate, the patient and I discussed the availability palliative care services and when appropriate Hospice care. Palliative care is not the same as Hospice care which was explained to the patient.NOT APPLICABLE.    Survivorship   When appropriate, we discussed that we will refer the patient to survivorship clinic to discuss next steps following completion of planned treatment.  Reviewed this visit will include assessment of your physical, psychological, functional, and spiritual needs as a survivor and the need at attend this visit when scheduled.    TREATMENT EDUCATION    Today I met with the patient to discuss the chemo/biotherapy regimen recommended for treatment of Polycythemia vera  .  The patient was given explanation of treatment premed side effects including office policy that prohibits patients to drive if sedating medications are administered, MD explanation given regarding benefits, side effects, toxicities and goals of treatment.  The patient received a Chemotherapy/Biotherapy Plan Summary including diagnosis and explanation of specific treatment plan.    SIDE EFFECTS:  Common side effects were discussed with the patient and/or significant other.  Discussion included where  applicable hair loss/discoloration, anemia/fatigue, infection/chills/fever, appetite, bleeding risk/precautions, constipation, diarrhea, mouth sores, taste alteration, loss of appetite, nausea/vomiting, peripheral neuropathy, skin/nail changes, rash, muscle aches/weakness, photosensitivity, weight gain/loss, hearing loss, dizziness, menopausal symptoms, menstrual irregularity, sterility, high blood pressure, heart damage, liver damage, lung damage, kidney damage, DVT/PE risk, fluid retention, pleural/pericardial effusion, somnolence, electrolyte/LFT imbalance, vein exercises and/or the possible need for vascular access/port placement.  The patient was advised that although uncommon, leakage of an infused medication from the vein or venous access device may lead to skin breakdown and/or other tissue damage.  The patient was advised that he/she may have pain, bleeding, and/or bruising from the insertion of a needle in their vein or venous access device (port).  The patient was further advised that, in spite of proper technique, infection with redness and irritation may rarely occur at the site where the needle was inserted.  The patient was advised that if complications occur, additional medical treatment is available.  Finally, where applicable we have reviewed rare but potential immune mediated side effects including shortness of breath, cough, chest pain (pneumonitis), abdominal pain, diarrhea (colitis), thyroiditis (hypothyroid or hyperthyroid), hepatitis and liver dysfunction, nephritis and renal dysfunction.    Discussion also included side effects specific to drugs in the treatment plan, specifically:    Treatment Plans     Name Type Plan Dates Plan Provider         Active    OP THROMBOCYTOSIS Hydroxyurea ONCOLOGY TREATMENT  5/29/2023 - Present Aquiles Lopez MD                    Questions answered and additional information discussed on topics including:  Anemia, Thrombocytopenia, Neutropenia, Nausea &  vomiting, Nervous system changes, Pain, Skin & nail changes, Organ toxicities, Home care and Oral medication handling and disposal       Assessment and Plan:    Diagnoses and all orders for this visit:    1. Polycythemia vera (Primary)      No orders of the defined types were placed in this encounter.        1. The patient and I have reviewed their diagnosis and scheduled treatment plan. Needs assessment was completed where applicable including genetics, psychosocial needs, barriers to care, VAD evaluation, advanced care planning, survivorship, and palliative care services where indicated. Referrals have been ordered as appropriate based upon evaluation today and patient desires.   2. Chemo/biotherapy teaching was completed today and consent obtained. See separate documentation for further details.  3. Adequate time was given to answer questions.  Patient made aware of their care team members and contact information if they have questions or problems throughout the treatment course.  4. Discussion held and written information provided describing frequency of office visits and ongoing monitoring throughout the treatment plan.     5. Reviewed with patient any prescribed medication sent to pharmacy.  Education provided regarding proper storage, safe handling, and proper disposal of unused medication.  6. Proper handling of body fluids and waste discussed and written information provided.  7. If appropriate, patient had pretreatment labs drawn today.    Learning assessment completed at initial patient encounter. See separate flowsheet. Chemo/biotherapy education comprehension assessed at today's visit.    I spent 20 minutes caring for Madhu on this date of service. This time includes time spent by me in the following activities: preparing for the visit, obtaining and/or reviewing a separately obtained history, performing a medically appropriate examination and/or evaluation, counseling and educating the  patient/family/caregiver, documenting information in the medical record and independently interpreting results and communicating that information with the patient/family/caregiver.     Gurjit Francisco, APRN   05/18/23

## 2023-05-18 NOTE — PROGRESS NOTES
Specialty Pharmacy Patient Management Program  Oncology Initial Assessment       Madhu Santoro is a 77 y.o. male with polycythemia seen by an Oncology provider and enrolled in the Oncology Patient Management program offered by [facility].  An initial outreach was conducted, including assessment of therapy appropriateness and specialty medication education for Hydrea (hydroxyurea). The patient was introduced to services offered by [facility], including: regular assessments, refill coordination, mail order delivery options, prior authorization maintenance, and financial assistance programs as applicable. The patient was also provided with contact information for the pharmacy team.     Diagnosis: polycythemia    Goal of chemotherapy: disease control    Treatment Medication(s) / Frequency and Dosing    1. Hydrea 1000 mg daily    Number of cycles: until disease progression or unacceptable toxicity    Start date of oral specialty medication: As soon as oral specialty medication is available.    Follow-up Testing to be determined after TBD cycles by MD.     Items for home use: Thermometer       Completing Pharmacist: Julianne Vicente RPH             Date/time: 05/18/2023 08:48 EDT         Relevant Past Medical History, Comorbidities, and Vaccines  Relevant medical history and concomitant health conditions were discussed with the patient. The patient's chart has been reviewed for relevant past medical history and comorbid health conditions and updated as necessary.  Vaccines are coordinated by the patient's oncologist and primary care provider.  Past Medical History:   Diagnosis Date   • Anxiety    • Arthritis    • Atrial flutter     Status post cavotricuspid isthmus ablation by Dr. Gustafson on 4/18/17   • Mandel esophagus    • Benign essential hypertension    • CAD (coronary artery disease)     3 vessel CABG 4/11/17 by Dr. Cortez: ROSARIO-prox LAD, SVG-OM1, SVG-OM3   •  "Carotid artery disease     Status post carotid endarterectomy - USG 4/10/17: 50-59% NICHELLE, 1-15% LICA.    • Colonic polyp    • Cyst of pancreas    • DDD (degenerative disc disease), lumbosacral    • GERD (gastroesophageal reflux disease)    • H/O bone density study    • H/O complete eye exam    • History of kidney stone    • HLD (hyperlipidemia)    • Hypertension    • Kidney stone     21   • Lipid screening 2013   • Low back pain     physical therapy [facility] 5-12-10   • Screening for prostate cancer 2015   • Skin cancer     nose   • Stroke     RESIDUAL--\"BALANCE ISSUES\"     Social History     Socioeconomic History   • Marital status:      Spouse name: Brooke   • Years of education: 9th grade   Tobacco Use   • Smoking status: Former     Packs/day: 1.00     Types: Cigarettes     Start date: 1959     Quit date: 2009     Years since quittin.3   • Smokeless tobacco: Never   • Tobacco comments:     CAFFEINE USE   Vaping Use   • Vaping Use: Never used   Substance and Sexual Activity   • Alcohol use: Not Currently     Comment: rarely   • Drug use: No   • Sexual activity: Defer     Partners: Female       Allergies  Known allergies and reactions were discussed with the patient. The patient's chart has been reviewed for allergy information and updated as necessary.   Allergies   Allergen Reactions   • Atorvastatin Myalgia     Myalgia   • Penicillins Hives, Swelling and Rash     Tolerates cephalosporins         Current Medication List  This medication list has been reviewed with the patient and evaluated for any interactions or necessary modifications/recommendations, and updated to include all prescription medications, OTC medications, and supplements the patient is currently taking.  This list reflects what is contained in the patient's profile, which has also been marked as reviewed to communicate to other providers it is the most up to date version of the patient's current " medication therapy.   Prior to Admission medications    Medication Sig Start Date End Date Taking? Authorizing Provider   allopurinol (Zyloprim) 300 MG tablet Take 1 tablet by mouth Daily. 2/17/23  Yes Damian Sanchez MD   amitriptyline (ELAVIL) 25 MG tablet Take 1 tablet by mouth Every Night for 30 days. 4/24/23 5/24/23 Yes Mukesh Russell II, MD   amLODIPine (NORVASC) 2.5 MG tablet Take 1 tablet by mouth Daily. 1/12/23  Yes Kate Ayala APRN   apixaban (ELIQUIS) 5 MG tablet tablet Take 1 tablet by mouth 2 (Two) Times a Day. 1/12/23  Yes Kate Ayala APRN   HYDROcodone-acetaminophen (NORCO)  MG per tablet Take 1 tablet by mouth Every 8 (Eight) Hours As Needed for Severe Pain. 30 day supply 5/1/23  Yes Marisol Perales APRN   Myrbetriq 25 MG tablet sustained-release 24 hour 24 hr tablet Take 1 tablet by mouth Daily. 10/5/20  Yes Gary Garcia MD   pantoprazole (PROTONIX) 40 MG EC tablet Take 1 tablet by mouth Daily. 1/9/23  Yes Damian Sanchez MD   ramipril (ALTACE) 5 MG capsule Take 1 capsule by mouth Daily. 1/9/23  Yes Damian Sanchez MD   rosuvastatin (Crestor) 20 MG tablet Take 1 tablet by mouth Daily. 1/9/23  Yes Damian Sanchez MD   furosemide (LASIX) 20 MG tablet TAKE ONE TABLET BY MOUTH DAILY  Patient not taking: Reported on 5/18/2023 4/4/23   Damian Sanchez MD   mupirocin (BACTROBAN) 2 % cream Apply 1 application topically to the appropriate area as directed 2 (Two) Times a Day. 7/18/22   PeachElmo irwin APRN   potassium chloride 10 MEQ CR tablet TAKE ONE TABLET BY MOUTH DAILY  Patient not taking: Reported on 5/18/2023 4/4/23   Damian Sanchez MD   amitriptyline (ELAVIL) 10 MG tablet Take 2 tablets by mouth Every Night. 4/18/23 4/24/23  Damian Sanchez MD   HYDROcodone-acetaminophen (NORCO)  MG per tablet Take 1 tablet by mouth Every 8 (Eight) Hours As Needed for Severe Pain. 30 day supply 4/4/23 5/1/23  Marisol Perales, MY       Drug Interactions  • Reviewed  concomitant medications, allergies, labs, comorbidities/medical history, quality of life, and immunization history.   • Drug-drug interactions noted and discussed during education: no significant drug interactions noted. . Reminded the patient to let us know before making any changes or starting any new prescription or OTC medications so we can first assess drug interactions.  • Drug-food interactions noted and discussed during education: Patient was instructed to avoid none    Recommended Medications Assessment  • Bone Health (such as calcium/vitamin D, bisphosphonate, RANKL inhibitor) - Not Indicated   • VTE prophylaxis - Not Indicated   • Prophylactic antimicrobials - Not Indicated     Relevant Laboratory Values  Lab Results   Component Value Date    GLUCOSE 116 (H) 02/21/2023    CALCIUM 8.5 (L) 02/21/2023     02/21/2023    K 3.8 02/21/2023    CO2 26.1 02/21/2023     (H) 02/21/2023    BUN 13 02/21/2023    CREATININE 0.93 02/21/2023    EGFRIFAFRI 75 02/08/2022    EGFRIFNONA 65 02/08/2022    BCR 14.0 02/21/2023    ANIONGAP 6.9 02/21/2023     Lab Results   Component Value Date    WBC 12.37 (H) 05/15/2023    RBC 6.62 (H) 05/15/2023    HGB 15.3 05/15/2023    HCT 50.4 05/15/2023    MCV 76.1 (L) 05/15/2023    MCH 23.1 (L) 05/15/2023    MCHC 30.4 (L) 05/15/2023    RDW 21.0 (H) 05/15/2023    RDWSD 53.2 05/15/2023    MPV 10.1 05/15/2023     (H) 05/15/2023    NEUTRORELPCT 71.2 05/15/2023    LYMPHORELPCT 11.6 (L) 05/15/2023    MONORELPCT 13.3 (H) 05/15/2023    EOSRELPCT 2.2 05/15/2023    BASORELPCT 0.4 05/15/2023    AUTOIGPER 1.3 (H) 05/15/2023    NEUTROABS 8.82 (H) 05/15/2023    LYMPHSABS 1.43 05/15/2023    MONOSABS 1.64 (H) 05/15/2023    EOSABS 0.27 05/15/2023    BASOSABS 0.05 05/15/2023    AUTOIGNUM 0.16 (H) 05/15/2023    NRBC 0.0 05/15/2023       The above labs have been reviewed. No dose adjustments are needed for the oral specialty medication(s) based on the labs.    Initial Education Provided for  Specialty Medication  The patient has been provided with the following education. All questions and concerns have been addressed prior to the patient receiving the medication, and the patient has verbalized understanding of the education and any materials provided.  Additional patient education shall be provided and documented upon request by the patient, provider or payer.      Provided patient with:   Education sheets about the medication, 24-hour clinic phone number and my contact information and instructions to call should additional questions arise.     Medication Education Sheets Provided: (select all that apply)  • Oral Specialty Medication: Hydrea (hydroxyurea)  • IV: n/a  • Steroid: None    TOPICS COMMENTS   Storage and Handling of Oral Specialty Medication Store in the original container, in a dry location out of direct sunlight, and out of reach of children or pets. and Store at room temperature.  Discussed safe handling and what to do with any unused medication.   Administration of Oral Specialty Medication Take with or without food at the same time(s) each day.   Adherence to Oral Specialty Regimen and Handling Missed Doses Patient is likely to have good treatment adherence; reinforced the importance of adherence. Reviewed how to address missed doses and to let us know of any missed doses.   Anemia: role of RBC, cause, s/s, ways to manage, role of transfusion Reviewed the role of RBC and the use of transfusions if hemoglobin decreases too much.  Patient to notify us if they experience shortness of breath, dizziness, or palpitations.  Also let patient know they could feel more tired than usual and to try to stay active, but rest if they need to.    Thrombocytopenia: role of platelet, cause, s/s, ways to prevent bleeding, things to avoid, when to seek help Reviewed the role of platelets in blood clotting and when to call clinic (bloody nose that bleeds for 5 mins despite pressure, a cut that won't stop  bleeding despite pressure, gums that bleed excessively with brushing or flossing). Recommended using an electric razor, soft bristle toothbrush, and blowing your nose gently.    Neutropenia: role of WBC, cause, infection precautions, s/s of infection, when to call MD Reviewed the role of WBC, good infection prevention practices, and when to call the clinic (temperature 100.4F, sore throat, burning urination, etc)  • COVID Vaccines: 2 doses plus 2 boosters  • Flu Vaccine: 2022 flu vaccine received   Organ Toxicities: cause, s/s, need for diagnostic tests, labs, when to notify MD Discussed potential effects on organ systems, monitoring, diagnostic tests, labs, and when to notify their MD. Discussed the signs/symptoms of the following: skin changes   Miscellaneous • Financial Issues: none  • Lab Draws: per MD   • Miscellaneous: none   Infertility and Sexuality:  causes, fertility preservation options, sexuality changes, ways to manage, importance of birth control The patient is not sexually active with a woman of childbearing potential.   Home Care: how to manage bodily fluids Counseled on management of soiled linens and proper flush technique.  Discussed how to manage all the side effects at home and advised when to contact the MD office   Survivorship: distress, distress assessment, secondary malignancies, early/late effects, follow-up, social issues, social support Discussed the rare, but possible risk of secondary malignancies months to years after treatment, most commonly Acute myeloid leukemia     Adherence and Self-Administration  • Barriers to Patient Adherence and/or Self-Administration: none  • Methods for Supporting Patient Adherence and/or Self-Administration: none  • Expected duration of therapy: Until disease progression or intolerable toxicity    Goals of Therapy  • Patient Goals of Therapy:   o Consistently take medications as prescribed  o Patient will adhere to medication regimen  o Patient will  report any medication side effects to healthcare provider  • Clinical Goals:    Goals     • Specialty Pharmacy General Goal      Control disease          o Support patient understanding of medication regimen  o Ensure patient knows the pharmacy contact information  o Schedule regular follow-up to monitor the treatment serious adverse events  o Schedule regular follow-up to confirm medication adherence  o Schedule regular follow-up to monitor disease progression or stability    Quality of Life Assessment   The patient's current (pre-therapy) quality of life was discussed with the patient. The QOL segment of this outreach has been reviewed and updated.   • Quality of Life Score: 5/10    Wrap up  Discussed aforementioned material with patient in person, face-to-face, in clinic.   Chemo consents/CCA were signed at today's visit.   Medication availability: Patient aware we will send to [facility] pharmacy if available to fill.  Otherwise, will send to [facility].   Patient and his wife, present in today's appointment, expressed understanding.  Patient demonstrates ability to self-administer medication. No barriers to adherence identified.  All questions and concerns addressed.     Reassessment Plan & Follow-Up  1. Pharmacist to perform regular reassessments no more than (6) months from the previous assessment.    Additional Plans, Therapy Recommendations or Therapy Problems to Be Addressed: none     Attestation  I attest that the initiated specialty medication(s) are appropriate for the patient based on my assessment.  If the prescribed therapy is at any point deemed not appropriate based on the current or future assessments, a consultation will be initiated with the patient's specialty care provider to determine the best course of action. The revised plan of therapy will be documented along with any additional patient education provided.     Name/Credentials: Jerad Vicente, Janie, BCOP    Date and Time: 5/18/2023  08:48 EDT              Oral Chemotherapy - New Referral    Received a referral from Dr. Lopez    Treatment Plan: Hydrea (hydroxyurea)  Start date of treatment planned for: As soon as oral specialty medication is available.  Indication: polycythemia  Relevant past treatments: phlebotomy  Is the therapy appropriate based on treatment guidelines and FDA labeling?: yes  Therapeutic Goals: Continue treatment until progression or intolerable toxicity  Patient can self-administer oral medications: Yes    • Drug-Drug Interactions: The current medication list was reviewed and there are no relevant drug-drug interactions.  • Medication Allergies: The patient has no relevant allergies as it relates to their oral specialty medication  • Review of Labs/Dose Adjustments: No dose adjustments based on patient's most recent labs.    A prescription was released to [facility] specialty pharmacy for   Drug: Hydrea (hydroxyurea)  Strength: 500 mg  Directions: Take 2 capsules by mouth daily  Quantity: 60  Refills: 5    Pharmacy education complete today. CCA and consents signed at this time.    Name/Credentials: Donny SamaniegoD, BCOP    5/18/2023  09:30 EDT    Completed independent double check on medication order/RX.    Thanks,   Kaykay Gary, DonnyD, BCPS

## 2023-05-19 ENCOUNTER — TELEPHONE (OUTPATIENT)
Dept: OTHER | Facility: HOSPITAL | Age: 78
End: 2023-05-19
Payer: MEDICARE

## 2023-05-19 NOTE — TELEPHONE ENCOUNTER
Oncology Social Work  Distress Screening Follow-up    Diagnosis: Polycythemia vera    Distress Level: 5 (5/18/2023  8:00 AM)    Physical Concerns:    Pain: Y     Interventions:     Emotional Needs: emotional suppport/coping strategies    Comments:   Spoke to the patient; explained role of OSW and supportive oncology services.  Discussed patient’s distress screening score of 5 from 5/18/23.  OSW actively listened to the patient as he discussed his greatest stressor being the neuropathy in his feet which is both painful and causes him trouble with walking.  The patient reports no falls at home, states that he has a cane if needed, still drives and has support from his wife.  The patient was encouraged to be careful when driving and ambulating.  The patient currently denies any supportive oncology needs and he was informed that a letter with OSW contact information will be sent to him for any future psychosocial needs that may arise.  ANTOINE Correa

## 2023-05-22 ENCOUNTER — SPECIALTY PHARMACY (OUTPATIENT)
Dept: PHARMACY | Facility: HOSPITAL | Age: 78
End: 2023-05-22
Payer: MEDICARE

## 2023-06-01 ENCOUNTER — LAB (OUTPATIENT)
Dept: LAB | Facility: HOSPITAL | Age: 78
End: 2023-06-01
Payer: MEDICARE

## 2023-06-01 ENCOUNTER — TELEPHONE (OUTPATIENT)
Dept: NEUROLOGY | Facility: CLINIC | Age: 78
End: 2023-06-01

## 2023-06-01 ENCOUNTER — OFFICE VISIT (OUTPATIENT)
Dept: ONCOLOGY | Facility: CLINIC | Age: 78
End: 2023-06-01

## 2023-06-01 ENCOUNTER — APPOINTMENT (OUTPATIENT)
Dept: ONCOLOGY | Facility: HOSPITAL | Age: 78
End: 2023-06-01
Payer: MEDICARE

## 2023-06-01 VITALS
DIASTOLIC BLOOD PRESSURE: 75 MMHG | BODY MASS INDEX: 24.93 KG/M2 | OXYGEN SATURATION: 97 % | RESPIRATION RATE: 18 BRPM | HEART RATE: 68 BPM | WEIGHT: 164.5 LBS | TEMPERATURE: 97.9 F | HEIGHT: 68 IN | SYSTOLIC BLOOD PRESSURE: 163 MMHG

## 2023-06-01 DIAGNOSIS — D45 POLYCYTHEMIA VERA: Primary | ICD-10-CM

## 2023-06-01 DIAGNOSIS — Z79.899 HIGH RISK MEDICATION USE: ICD-10-CM

## 2023-06-01 DIAGNOSIS — G60.9 PERIPHERAL NEUROPATHY, IDIOPATHIC: Primary | ICD-10-CM

## 2023-06-01 DIAGNOSIS — D45 POLYCYTHEMIA VERA: ICD-10-CM

## 2023-06-01 LAB
BASOPHILS # BLD AUTO: 0.03 10*3/MM3 (ref 0–0.2)
BASOPHILS NFR BLD AUTO: 0.6 % (ref 0–1.5)
DEPRECATED RDW RBC AUTO: 48.6 FL (ref 37–54)
EOSINOPHIL # BLD AUTO: 0.07 10*3/MM3 (ref 0–0.4)
EOSINOPHIL NFR BLD AUTO: 1.4 % (ref 0.3–6.2)
ERYTHROCYTE [DISTWIDTH] IN BLOOD BY AUTOMATED COUNT: 20.5 % (ref 12.3–15.4)
HCT VFR BLD AUTO: 49.8 % (ref 37.5–51)
HGB BLD-MCNC: 15.2 G/DL (ref 13–17.7)
IMM GRANULOCYTES # BLD AUTO: 0.06 10*3/MM3 (ref 0–0.05)
IMM GRANULOCYTES NFR BLD AUTO: 1.2 % (ref 0–0.5)
LYMPHOCYTES # BLD AUTO: 1.14 10*3/MM3 (ref 0.7–3.1)
LYMPHOCYTES NFR BLD AUTO: 22.5 % (ref 19.6–45.3)
MCH RBC QN AUTO: 23.5 PG (ref 26.6–33)
MCHC RBC AUTO-ENTMCNC: 30.5 G/DL (ref 31.5–35.7)
MCV RBC AUTO: 77 FL (ref 79–97)
MONOCYTES # BLD AUTO: 0.32 10*3/MM3 (ref 0.1–0.9)
MONOCYTES NFR BLD AUTO: 6.3 % (ref 5–12)
NEUTROPHILS NFR BLD AUTO: 3.44 10*3/MM3 (ref 1.7–7)
NEUTROPHILS NFR BLD AUTO: 68 % (ref 42.7–76)
NRBC BLD AUTO-RTO: 0 /100 WBC (ref 0–0.2)
PLATELET # BLD AUTO: 113 10*3/MM3 (ref 140–450)
PMV BLD AUTO: 10 FL (ref 6–12)
RBC # BLD AUTO: 6.47 10*6/MM3 (ref 4.14–5.8)
WBC NRBC COR # BLD: 5.06 10*3/MM3 (ref 3.4–10.8)

## 2023-06-01 PROCEDURE — 85025 COMPLETE CBC W/AUTO DIFF WBC: CPT

## 2023-06-01 PROCEDURE — 36415 COLL VENOUS BLD VENIPUNCTURE: CPT

## 2023-06-01 NOTE — PROGRESS NOTES
Subjective     REASON FOR FOLLOW-UP: Polycythemia vera, JAK2 mutation detected    HISTORY OF PRESENT ILLNESS:  Mr. Santoro is a 77 y.o. with P vera, almost 2 weeks ago was started on Hydrea 1000 mg daily for worsening blood counts.  Hematocrit got as high as 60% and hemoglobin 18.3 as of 4/10/2023.  Platelet count at that time was also up to 477,000.    As he is reviewed back today we discussed that he has had a quick response to Hydrea with platelet count dropping lower than we would like it 113,000.  Hemoglobin now more reasonable at 15.2, hematocrit 49%.    Thankfully patient has tolerated Hydrea well thus far without any GI symptoms and no rash.    Hematologic/oncologic history:     The patient is a 77-year-old male followed with a number of issues including coronary artery disease, status post CABG, atrial flutter, previous CVA hyperlipidemia, GE reflux, carotid vascular disease, and hypertension.     He had been seen by vascular 2/25/2022 with mild progression of carotid disease but otherwise stable and also had follow-up with pain management for back pain 3/28/2022 requiring chronic narcotic therapy.  Patient has been resistant to epidural like procedures.     Unfortunately has had concurrent constipation requiring laxatives and additional opioid antagonist to reduce GI hypomotility.  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     Patient recently reviewed again by pain management with worsening pain.  Patient also saw GI periodically undergoing a follow-up MRI of the abdomen 8/9/2022 with scattered pancreatic cystic lesions unchanged from previous.  He had previously undergone EGD and colonoscopy 11/5/2021 with irregular Z-line and biopsies taken in nonbleeding internal hemorrhoids found during retroflexion that were small.  There are scattered small  and large mouth diverticula found in the sigmoid colon descending colon and splenic flexure.       The patient now presents for thrombocytosis with review of his record over the last year demonstrating a platelet count that is escalated from 3-400,000 now to 8/11/2022 648,000 but concurrent microcytic and hypochromic indices.  These indices have been slowly reducing over the last 2 years approximately followed more closely.        These findings are discussed with the patient 8/16/2022 who indicates that he actually feels about the same though still has issues with back pain.  He is noted no change in bowel habit including, fortunately, improvement of his constipation without the use of additional medication such as Movantik.  He has not noted any change in the color of his stool including dark stools or any blood per rectum, urine though he does note periodic excess bruising from his anticoagulation therapy.       The patient moved to a trial of iron which, unfortunately, worsened his constipation in which he had discontinue. He had further issues in early September with left renal stone, requiring cystoscopy, stent placement 8/23/2022.       He is next seen back 9/28/2022 with repeat studies performed 9/21 demonstrating 5% iron saturation, ferritin of 12.5.  He remains further weight and fatigue, again oral iron intolerant and will require IV iron preparations for his iron deficiency anemia.      The patient is next seen 1/18/2023 with considerable improvement in his testing including H&H now 17.2 and 55.4 though with iron saturation of 8% and ferritin 26.5.  He has not noted any blood loss but remains generally weak and with ongoing periodic abdominal pain.  His major concerns continue to be a disequilibrium since his previous CVA symptoms.  He has not been seen in follow-up by neurology.  He continues to have hematuria by urinary assessment but not gross findings.  We have discussed that he may actually have a  "myeloproliferative disorder and requested that he undergo an assessment for JAK2 analysis today.    He is seen back 4/17/2023JAK  2-V617 F mutation having been detected.  In the interval he was admitted 2/19-21/23 for weakness, fall and ER evaluation not demonstrate any acute intracranial process, CT of lumbar spine with lumbar degenerative disease and other studies not show any acute abnormalities.  Fortunately he went on to improve though his wife had a positive COVID test recently on home examination.  His follow-up testing 4/10/2023 include an H&H of 18.3 and 60.9 white count of 14,700 platelet count of 477,000.    He is seen 4/17/2023 and we discussed that he is better served with phlebotomy at this point.  He states he feels so poorly that he is quite willing to try to move to additional therapies including phlebotomy.    Patient initiating Hydrea 1000 mg daily as of 5/19/2023.      Past Medical History:   Diagnosis Date   • Anxiety    • Arthritis    • Atrial flutter     Status post cavotricuspid isthmus ablation by Dr. Gustafson on 4/18/17   • Mandel esophagus    • Benign essential hypertension    • CAD (coronary artery disease)     3 vessel CABG 4/11/17 by Dr. Cortez: ROSARIO-prox LAD, SVG-OM1, SVG-OM3   • Carotid artery disease     Status post carotid endarterectomy - USG 4/10/17: 50-59% NICHELLE, 1-15% LICA.    • Colonic polyp    • Cyst of pancreas    • DDD (degenerative disc disease), lumbosacral    • GERD (gastroesophageal reflux disease)    • H/O bone density study 2013   • H/O complete eye exam 2014   • History of kidney stone    • HLD (hyperlipidemia)    • Hypertension    • Kidney stone     8/22/22   • Lipid screening 05/31/2013   • Low back pain     physical therapy [facility] rehab 5-12-10   • Screening for prostate cancer 07/07/2015   • Skin cancer     nose   • Stroke     RESIDUAL--\"BALANCE ISSUES\"        Past Surgical History:   Procedure Laterality Date   • CARDIAC CATHETERIZATION N/A 04/10/2017    " Procedure: Left Heart Cath;  Surgeon: Marjorie Healy MD;  Location: [facility] CATH INVASIVE LOCATION;  Service:    • CARDIAC CATHETERIZATION N/A 04/10/2017    Procedure: Coronary angiography;  Surgeon: Marjorie Healy MD;  [facility];  Service:    • CARDIAC CATHETERIZATION N/A 04/10/2017    Procedure: Left ventriculography;  Surgeon: Marjorie Healy MD;  Location: [facility] CATH INVASIVE LOCATION;  Service:    • CARDIAC CATHETERIZATION  2011   • CARDIAC ELECTROPHYSIOLOGY PROCEDURE N/A 04/18/2017    Procedure: Ablation atrial flutter;  Surgeon: Jose Antonio Gustafson MD;  Location: [facility] CATH INVASIVE LOCATION;  Service:    • CAROTID ENDARTERECTOMY     • CHOLECYSTECTOMY     • CHOLECYSTECTOMY WITH INTRAOPERATIVE CHOLANGIOGRAM N/A 09/07/2019    Procedure: Laparoscopic cholecystectomy with intraoperative cholangiogram;  Surgeon: Gauri Travis MD;  Location: [facility] MAIN OR;  Service: General   • COLONOSCOPY  01/06/2015    Diverticulosis, one TA   • COLONOSCOPY N/A 02/14/2019    tics, NBIH, adenomatous polyp x 2   • COLONOSCOPY N/A 11/05/2021    Procedure: COLONOSCOPY TO CECUM AND TERM. ILEUM WITH COLD POLYPECTOMIES;  Surgeon: Everton Abel MD;  Location: [facility] ENDOSCOPY;  Service: Gastroenterology;  Laterality: N/A;  PRE OP - PERS H/O POLYPS  POST OP - COLON POLYPS,, DIVERTICULOSIS, HEMORRHOIDS   • CORONARY ARTERY BYPASS GRAFT N/A 04/11/2017    Procedure: AR STERNOTOMY CORONARY ARTERY BYPASS GRAFT TIMES 3 USING LEFT INTERNAL MAMMARY ARTERY AND LEFT GREATER SAPHENOUS VEIN GRAFT PER ENDOSCOPIC VEIN HARVESTING AND PRP ;  Surgeon: Temo Cortez MD;  Location: [facility] OR;  Service:    • ENDOSCOPY  01/06/2015    HH, Mandel's esophagus   • ENDOSCOPY N/A 02/14/2019    Z line irregular, HH, Mandel's esophagus   • ENDOSCOPY N/A 11/05/2021    Procedure: ESOPHAGOGASTRODUODENOSCOPY WITH BIOPSIES;  Surgeon: Everton Abel MD;  Location: [facility] ENDOSCOPY;  Service: Gastroenterology;  Laterality:  N/A;  PRE OP - PERS H/O ERVIN'S  POST OP - IRREG Z LINE   • KNEE SURGERY Left    • URETEROSCOPY LASER LITHOTRIPSY WITH STENT INSERTION Left 8/23/2022    Procedure: Cysto retrograde with left uretro stent placement;  Surgeon: Damien Oliveira MD;  Location: [facility] OR;  Service: Urology;  Laterality: Left;   • VASECTOMY          Current Outpatient Medications on File Prior to Visit   Medication Sig Dispense Refill   • allopurinol (Zyloprim) 300 MG tablet Take 1 tablet by mouth Daily. 90 tablet 1   • amLODIPine (NORVASC) 2.5 MG tablet Take 1 tablet by mouth Daily. 90 tablet 3   • apixaban (ELIQUIS) 5 MG tablet tablet Take 1 tablet by mouth 2 (Two) Times a Day. 90 tablet 3   • furosemide (LASIX) 20 MG tablet TAKE ONE TABLET BY MOUTH DAILY 7 tablet 1   • HYDROcodone-acetaminophen (NORCO)  MG per tablet Take 1 tablet by mouth Every 8 (Eight) Hours As Needed for Severe Pain. 30 day supply 75 tablet 0   • hydroxyurea (HYDREA) 500 MG capsule Take 2 capsules by mouth Daily. 60 capsule 5   • mupirocin (BACTROBAN) 2 % cream Apply 1 application topically to the appropriate area as directed 2 (Two) Times a Day. 15 g 0   • Myrbetriq 25 MG tablet sustained-release 24 hour 24 hr tablet Take 1 tablet by mouth Daily.     • pantoprazole (PROTONIX) 40 MG EC tablet Take 1 tablet by mouth Daily. 90 tablet 1   • potassium chloride 10 MEQ CR tablet TAKE ONE TABLET BY MOUTH DAILY 7 tablet 1   • ramipril (ALTACE) 5 MG capsule Take 1 capsule by mouth Daily. 90 capsule 1   • rosuvastatin (Crestor) 20 MG tablet Take 1 tablet by mouth Daily. 90 tablet 1     Current Facility-Administered Medications on File Prior to Visit   Medication Dose Route Frequency Provider Last Rate Last Admin   • cyanocobalamin injection 1,000 mcg  1,000 mcg Intramuscular Q30 Days Damian Sanchez MD   1,000 mcg at 04/06/23 0957        ALLERGIES:    Allergies   Allergen Reactions   • Atorvastatin Myalgia     Myalgia   • Penicillins Hives, Swelling and Rash  "    Tolerates cephalosporins         Social History     Socioeconomic History   • Marital status:      Spouse name: Brooke   • Years of education: 9th grade   Tobacco Use   • Smoking status: Former     Packs/day: 1.00     Types: Cigarettes     Start date: 1959     Quit date: 2009     Years since quittin.4   • Smokeless tobacco: Never   • Tobacco comments:     CAFFEINE USE   Vaping Use   • Vaping Use: Never used   Substance and Sexual Activity   • Alcohol use: Not Currently     Comment: rarely   • Drug use: No   • Sexual activity: Defer     Partners: Female        Family History   Problem Relation Age of Onset   • Hypertension Mother    • Heart disease Mother    • Heart attack Mother    • Stroke Mother    • Heart disease Father    • Heart attack Father    • Stroke Father    • Hypertension Sister    • Heart attack Brother    • Heart disease Brother    • No Known Problems Brother    • Heart disease Brother    • Heart attack Brother    • Diabetes Brother    • No Known Problems Maternal Grandmother    • No Known Problems Maternal Grandfather    • No Known Problems Paternal Grandmother    • No Known Problems Paternal Grandfather    • Pancreatic cancer Paternal Cousin    • Arthritis Other    • Diabetes Other    • Hypertension Other    • Kidney disease Other         stones   • Malig Hyperthermia Neg Hx         Review of Systems   As per HPI    Objective     Vitals:    23 1401   BP: 163/75   Pulse: 68   Resp: 18   Temp: 97.9 °F (36.6 °C)   TempSrc: Temporal   SpO2: 97%   Weight: 74.6 kg (164 lb 8 oz)   Height: 172.7 cm (67.99\")   PainSc:   8   PainLoc: Back  Comment: feet         2023     1:58 PM   Current Status   ECOG score 0     Physical Exam  Vitals reviewed.   Constitutional:       General: He is not in acute distress.     Appearance: Normal appearance. He is well-developed.   HENT:      Head: Normocephalic and atraumatic.   Eyes:      Pupils: Pupils are equal, round, and reactive to light. "   Cardiovascular:      Rate and Rhythm: Normal rate and regular rhythm.      Heart sounds: Normal heart sounds. No murmur heard.  Pulmonary:      Effort: Pulmonary effort is normal. No respiratory distress.      Breath sounds: Normal breath sounds. No wheezing, rhonchi or rales.   Abdominal:      General: Bowel sounds are normal. There is no distension.      Palpations: Abdomen is soft.   Musculoskeletal:         General: Normal range of motion.      Cervical back: Normal range of motion.   Skin:     General: Skin is warm and dry.      Findings: No rash.   Neurological:      Mental Status: He is alert and oriented to person, place, and time.       I have reexamined the patient and the results are consistent with the previously documented exam. MY Walters       RECENT LABS:  Results from last 7 days   Lab Units 06/01/23  1346   WBC 10*3/mm3 5.06   NEUTROS ABS 10*3/mm3 3.44   HEMOGLOBIN g/dL 15.2   HEMATOCRIT % 49.8   PLATELETS 10*3/mm3 113*                   Assessment & Plan      77-year-old male with medical issues including coronary artery disease, status post CABG, atrial flutter status post ablation, previous CVA, hyperlipidemia, GE reflux, cardiovascular disease and hypertension.  He also has chronic back pain followed by pain management requiring chronic narcotic therapy.  His medical issues per tickly cardiovascular and CVA led to eventual placement on aspirin and Eliquis chronically.  He has additionally been seen by GI including review of scattered cystic lesions in the pancreas and EGD colonoscopy as recently as November 2021.      The patient now presents for thrombocytosis with review of his record over the last year demonstrating a platelet count that is escalated from 3-400,000 now to 8/11/2022 648,000 but concurrent microcytic and hypochromic indices.  These indices have been slowly reducing over the last 2 years approximately followed more closely.  Concurrently is a mild drop in his  baseline hemoglobin still well within normal limits.    These issues are discussed with the patient in some detail 8/16/2022 with this thrombocytosis thought, initially, to be related to iron deficiency.  This issupported during his visit with ferritin found to be 16.3 and iron of 26 with 5% saturation and TIBC 511.  We should first moved to replace his iron stores with his platelet count likely to reflect this reconstitution.  If he has continued evidence of iron deficiency despite this or is intolerant to oral iron (noting his history with constipation) then we would need to move to intravenous iron therapy and possibly further GI assessment with capsule endoscopy.  The patient was placed on ferrous gluconate which, unfortunately, he was unable to tolerate with worsening constipation.      He had further issues in early September with left renal stone, requiring cystoscopy, stent placement 8/23/2022.    He is next seen back 9/28/2022 with repeat studies performed 9/21 demonstrating 5% iron saturation, ferritin of 12.5.  He remains further weight and fatigue, again oral iron intolerant and will require IV iron preparations for his iron deficiency anemia.    We discontinued oral iron and proceeded with Injectafer given 9/28/2022 in 10/5/2022     He is reassessed 1/18/2023 with follow-up CBC including H&H of 17.2 and 55.4, white count of 10,650 and platelet count of 470,000, iron of 38 with a percent saturation and ferritin of 26.5.     He has not noted any blood loss but remains generally weak and with ongoing periodic abdominal pain.  He continues to have hematuria by urinary assessment but not gross findings.  He also to continues to have periodic disequilibrium and unsteadiness and we will request neurologic assessment.  We have discussed that he may actually have a myeloproliferative disorder and requested that he undergo an assessment for JAK2 analysis today.    He is seen back 4/17/2023JAK  2-V617 F mutation  having been detected.  In the interval he was admitted 2/19-21/23 for weakness, fall and ER evaluation not demonstrate any acute intracranial process, CT of lumbar spine with lumbar degenerative disease and other studies not show any acute abnormalities.  Fortunately he went on to improve though his wife had a positive COVID test recently on home examination.  His follow-up testing 4/10/2023 include an H&H of 18.3 and 60.9 white count of 14,700 platelet count of 477,000.    He is seen 4/17/2023 and we discussed that he is better served with phlebotomy at this point.    5/15/2023 hemoglobin 15.3, hematocrit 50.4.  Reviewed with Dr. Lopez plans to hold off on phlebotomy and initiate Hydrea 1000 mg daily.  We will tentatively schedule him for return follow-up visit in 2 weeks with repeat labs and reassessment.    Patient reviewed back 2 weeks into current therapy with Hydrea 1000 mg daily.  WBC has dropped to 5.0, hemoglobin 15.2, hematocrit 49.8, platelets 113,000.  This is just with 2 weeks of drug.  Discussed case with Dr. Lopez and we will have the patient drop down to Hydrea 500 mg daily and reassess in 2 weeks.    PLAN:  1. Drop Hydrea dose to 500 mg daily.  2. Return in 2 in 4 weeks for MD or NP follow-up with repeat CBC, CMP and assess tolerance and need for any further dose adjustment.    The above plan of care was discussed with Dr. Lopez who is in agreement.    This patient is on high risk drug therapy requiring intensive monitoring for toxicity.        Desiree Condon, MY  06/01/2023

## 2023-06-01 NOTE — TELEPHONE ENCOUNTER
Caller: ALLEN     Relationship: WIFE PT GAVE VERBAL TO TALK     Best call back number: [phone]    Requested Prescriptions: AMITRIPTYLINE DOSAGE INCREASED?   Requested Prescriptions      No prescriptions requested or ordered in this encounter        Pharmacy where request should be sent:[facility] [id number] [facility] [address] - [phone] PH - [phone] FX  [phone]      Last office visit with prescribing clinician: 4/24/2023   Last telemedicine visit with prescribing clinician: Visit date not found   Next office visit with prescribing clinician: 8/9/2023     Additional details provided by patient: PT WAS TAKEN OFF OF LYRICA BY PCP AND WAS NOT REPLACED BY NEURO? / OFFICE WAS TO F.U AFTER TESTING EMG , NO APPT MADE.     I would like to check peripheral neuropathy labs.  We will wait a couple of weeks since he just had a vitamin B12 injection.  Amitriptyline 25 mg nightly for neuropathic pain.  We will schedule an EMG/nerve conduction study of both lower extremities.  We will see him back after testing is been completed    PLEASE CALL PT TO CHANGE RX? AND HOPEFULLY CALL HIM IN SOMETHING FOR PAIN (NEUROP) ALSO TO GET APPT SOONER IF NEEDED      Would you like a call back once the refill request has been completed: [x] Yes [] No    If the office needs to give you a call back, can they leave a voicemail: [x] Yes [] No    PLEASE CALL PT TODAY TO DISCUSS     Daniela Oates   06/01/23 11:58 EDT

## 2023-06-02 RX ORDER — AMITRIPTYLINE HYDROCHLORIDE 50 MG/1
50 TABLET, FILM COATED ORAL NIGHTLY
Qty: 30 TABLET | Refills: 3 | Status: SHIPPED | OUTPATIENT
Start: 2023-06-02

## 2023-06-02 NOTE — TELEPHONE ENCOUNTER
Spoke to Dr. Russell we can increase this to 50 mg nightly. Called pts wife. Pt wife stated understanding.

## 2023-06-08 ENCOUNTER — SPECIALTY PHARMACY (OUTPATIENT)
Dept: PHARMACY | Facility: HOSPITAL | Age: 78
End: 2023-06-08
Payer: MEDICARE

## 2023-06-08 NOTE — PROGRESS NOTES
MTM Encounter 06/08/2023: Re: Side effect check-in and adherence      Today's encounter was conducted via phone.      Medication:Hydroxyurea 500 mg daily     Reason for outreach: discuss new start Hydrea, with dose reduction and any possible medication concerns  Administration: patient takes medication at same times daily  Missed doses: patient reports understanding of timely dosing and has not missed any doses in the past two weeks   Questions or concerns about regimen: He is not having any side effects at the time.      Patient reports no new medications and no known drug allergies.      Medication list assessed for interactions: none of concern with Hydroxyurea     All questions addressed. Patient had no additional concerns for MTM office.      Yusra Estrada, PharmD Candidate

## 2023-06-12 ENCOUNTER — OFFICE VISIT (OUTPATIENT)
Dept: PAIN MEDICINE | Facility: CLINIC | Age: 78
End: 2023-06-12
Payer: MEDICARE

## 2023-06-12 VITALS
TEMPERATURE: 96.9 F | HEART RATE: 83 BPM | DIASTOLIC BLOOD PRESSURE: 80 MMHG | BODY MASS INDEX: 24.92 KG/M2 | HEIGHT: 68 IN | RESPIRATION RATE: 12 BRPM | OXYGEN SATURATION: 99 % | SYSTOLIC BLOOD PRESSURE: 160 MMHG | WEIGHT: 164.4 LBS

## 2023-06-12 DIAGNOSIS — M25.50 ARTHRALGIA OF MULTIPLE JOINTS: ICD-10-CM

## 2023-06-12 DIAGNOSIS — M54.50 CHRONIC BILATERAL LOW BACK PAIN WITHOUT SCIATICA: Primary | ICD-10-CM

## 2023-06-12 DIAGNOSIS — M47.816 LUMBAR FACET ARTHROPATHY: ICD-10-CM

## 2023-06-12 DIAGNOSIS — Z79.899 ENCOUNTER FOR LONG-TERM (CURRENT) USE OF HIGH-RISK MEDICATION: ICD-10-CM

## 2023-06-12 DIAGNOSIS — M25.552 CHRONIC PAIN OF BOTH HIPS: ICD-10-CM

## 2023-06-12 DIAGNOSIS — G89.29 CHRONIC PAIN OF BOTH HIPS: ICD-10-CM

## 2023-06-12 DIAGNOSIS — G89.29 CHRONIC BILATERAL LOW BACK PAIN WITHOUT SCIATICA: Primary | ICD-10-CM

## 2023-06-12 DIAGNOSIS — M25.551 CHRONIC PAIN OF BOTH HIPS: ICD-10-CM

## 2023-06-12 PROCEDURE — 99214 OFFICE O/P EST MOD 30 MIN: CPT

## 2023-06-12 PROCEDURE — 3077F SYST BP >= 140 MM HG: CPT

## 2023-06-12 PROCEDURE — 1125F AMNT PAIN NOTED PAIN PRSNT: CPT

## 2023-06-12 PROCEDURE — 3079F DIAST BP 80-89 MM HG: CPT

## 2023-06-12 RX ORDER — HYDROCODONE BITARTRATE AND ACETAMINOPHEN 10; 325 MG/1; MG/1
1 TABLET ORAL EVERY 8 HOURS PRN
Qty: 75 TABLET | Refills: 0 | Status: SHIPPED | OUTPATIENT
Start: 2023-06-12

## 2023-06-12 NOTE — PROGRESS NOTES
"CHIEF COMPLAINT  Back and joint pain     Subjective   Madhu Santoro is a 77 y.o. male  who presents for follow-up.  He has a history of chronic low back and joint pain. He reports that his pain has remained consistent since his last office visit.    Today pain is 7/10VAS in severity. Pain is located in his lumbar spine and runs across his belt line. He continues to report pain in bilateral hands and numbness to bilateral feet. Describes this pain as a nearly continuous aching and throbbing. Pain is worsened by prolonged walking or standing, increased physical activity, and bending/twisitng. Pain improves with rest/reposition, use of a heating pad, and medication. He reports that he has tried PT in the past but this only caused increased pain.     Continues with Hydrocodone 10mg 2-3/day and OTC Tylenol PRN. Reports occasional constipation that is managed by Miralax and laxatives. Denies any other side effects from the regimen including somnolence. The regimen helps \"take the edge off\". Notes improvement in activity and function with regimen. ADL's by self. Denies any bowel or bladder changes.      Not a candidate for NSAIDs - history of TIA's and remains on Eliquis     Continues to have no interest in interventional procedures due to a friend having developed foot drop following a procedure for back pain.     He was previously taking Pregabalin prescribed by Dr. Sanchez but has to discontinue use due to increased swelling to BLE. He saw Dr. Sanchez on 4/18/23 and was started on Elavil 20mg at HS. He reports that this medication is working and allows him to rest better at night.   He continue with Allopurinol 300mg for gout.     Sees Dr. Lopez for thrombocytosis secondary to iron deficiency anemia. He gets iron infusions as needed.    Back Pain  This is a chronic problem. The current episode started more than 1 year ago. The problem occurs constantly. The problem has been waxing and waning since onset. The pain is " present in the lumbar spine. The quality of the pain is described as aching (throbbing). Radiates to: to bilateral legs L > R  The pain is at a severity of 7/10. The symptoms are aggravated by standing, sitting, twisting, bending, lying down and position (walking long distances, weather changes). Stiffness is present All day. Associated symptoms include numbness (hands/feet) and weakness (hands/feet). Pertinent negatives include no abdominal pain, chest pain, dysuria, fever or headaches. He has tried heat and analgesics (rest/reposition, PT in the past (this caused increased pain)) for the symptoms. The treatment provided mild relief.   Joint Pain  This is a chronic (bilateral hands, left shoulder, bilateral knees) problem. The current episode started more than 1 year ago. The problem occurs daily. The problem has been unchanged (unchanged since last office visit). Associated symptoms include arthralgias, numbness (hands/feet) and weakness (hands/feet). Pertinent negatives include no abdominal pain, chest pain, chills, congestion, coughing, fatigue, fever, headaches or joint swelling. The symptoms are aggravated by walking, standing and bending (weather changes, increased physical activity, ). He has tried oral narcotics, position changes, rest, heat, lying down and acetaminophen for the symptoms. The treatment provided moderate relief.      PEG Assessment   What number best describes your pain on average in the past week?8  What number best describes how, during the past week, pain has interfered with your enjoyment of life?8  What number best describes how, during the past week, pain has interfered with your general activity?  8    The following portions of the patient's history were reviewed and updated as appropriate: allergies, current medications, past family history, past medical history, past social history, past surgical history, and problem list.    Review of Systems   Constitutional:  Negative for  "activity change, chills, fatigue and fever.   HENT:  Negative for congestion.    Eyes:  Negative for visual disturbance.   Respiratory:  Negative for cough and chest tightness.    Cardiovascular:  Negative for chest pain.   Gastrointestinal:  Negative for abdominal pain, constipation and diarrhea.   Genitourinary:  Negative for difficulty urinating and dysuria.   Musculoskeletal:  Positive for arthralgias and back pain. Negative for joint swelling.   Neurological:  Positive for weakness (hands/feet), light-headedness and numbness (hands/feet). Negative for dizziness and headaches.   Psychiatric/Behavioral:  Negative for agitation, sleep disturbance and suicidal ideas. The patient is not nervous/anxious.      I have reviewed and confirmed the accuracy of the ROS as documented by the MA/LPN/RN MY Perez    Vitals:    06/12/23 1105   BP: 160/80   BP Location: Left arm   Patient Position: Sitting   Cuff Size: Adult   Pulse: 83   Resp: 12   Temp: 96.9 °F (36.1 °C)   TempSrc: Temporal   SpO2: 99%   Weight: 74.6 kg (164 lb 6.4 oz)   Height: 172.7 cm (67.99\")   PainSc:   7     Objective   Physical Exam  Constitutional:       Appearance: Normal appearance.   HENT:      Head: Normocephalic.   Cardiovascular:      Rate and Rhythm: Normal rate and regular rhythm.   Pulmonary:      Effort: Pulmonary effort is normal.      Breath sounds: Normal breath sounds.   Musculoskeletal:      Cervical back: Normal range of motion.      Lumbar back: Tenderness present. Decreased range of motion. Positive right straight leg raise test and positive left straight leg raise test.      Comments: Diffuse arthritic changes   Skin:     General: Skin is warm and dry.      Capillary Refill: Capillary refill takes less than 2 seconds.   Neurological:      General: No focal deficit present.      Mental Status: He is alert and oriented to person, place, and time.   Psychiatric:         Mood and Affect: Mood normal.         Behavior: " Behavior normal.         Thought Content: Thought content normal.         Cognition and Memory: Cognition normal.     Assessment & Plan   Diagnoses and all orders for this visit:    1. Chronic bilateral low back pain without sciatica (Primary)  -     HYDROcodone-acetaminophen (NORCO)  MG per tablet; Take 1 tablet by mouth Every 8 (Eight) Hours As Needed for Severe Pain. 30 day supply  Dispense: 75 tablet; Refill: 0    2. Arthralgia of multiple joints    3. Chronic pain of both hips  -     HYDROcodone-acetaminophen (NORCO)  MG per tablet; Take 1 tablet by mouth Every 8 (Eight) Hours As Needed for Severe Pain. 30 day supply  Dispense: 75 tablet; Refill: 0    4. Lumbar facet arthropathy    5. Encounter for long-term (current) use of high-risk medication    --- The urine drug screen confirmation from 2/6/23 has been reviewed and the result is appropriate based on patient history and RUTH report  --- The patient signed an updated copy of the controlled substance agreement on 6/12/23  --- Refill Hydrocodone. Patient appears stable with current regimen. No adverse effects. Regarding continuation of opioids, there is no evidence of aberrant behavior or any red flags.  The patient continues with appropriate response to opioid therapy. ADL's remain intact by self.   --- Follow-up 2 months or sooner if needed     RUTH REPORT  As part of the patient's treatment plan, I am prescribing controlled substances. The patient has been made aware of appropriate use of such medications, including potential risk of somnolence, limited ability to drive and/or work safely, and the potential for dependence or overdose. It has also been made clear that these medications are for use by this patient only, without concomitant use of alcohol or other substances unless prescribed.     Patient has completed prescribing agreement detailing terms of continued prescribing of controlled substances, including monitoring RUTH reports,  urine drug screening, and pill counts if necessary. The patient is aware that inappropriate use will results in cessation of prescribing such medications.    As the clinician, I personally reviewed the RUTH from  while the patient was in the office today.    History and physical exam exhibit continued safe and appropriate use of controlled substances.    Dictated utilizing Dragon dictation.

## 2023-06-15 ENCOUNTER — LAB (OUTPATIENT)
Dept: LAB | Facility: HOSPITAL | Age: 78
End: 2023-06-15
Payer: MEDICARE

## 2023-06-15 ENCOUNTER — OFFICE VISIT (OUTPATIENT)
Dept: ONCOLOGY | Facility: CLINIC | Age: 78
End: 2023-06-15
Payer: MEDICARE

## 2023-06-15 VITALS
HEIGHT: 68 IN | WEIGHT: 165.3 LBS | BODY MASS INDEX: 25.05 KG/M2 | HEART RATE: 75 BPM | SYSTOLIC BLOOD PRESSURE: 135 MMHG | RESPIRATION RATE: 18 BRPM | OXYGEN SATURATION: 96 % | TEMPERATURE: 98.4 F | DIASTOLIC BLOOD PRESSURE: 74 MMHG

## 2023-06-15 DIAGNOSIS — D45 POLYCYTHEMIA VERA: Primary | ICD-10-CM

## 2023-06-15 DIAGNOSIS — Z79.899 HIGH RISK MEDICATION USE: ICD-10-CM

## 2023-06-15 DIAGNOSIS — D45 POLYCYTHEMIA VERA: ICD-10-CM

## 2023-06-15 LAB
BASOPHILS # BLD AUTO: 0.01 10*3/MM3 (ref 0–0.2)
BASOPHILS NFR BLD AUTO: 0.2 % (ref 0–1.5)
DEPRECATED RDW RBC AUTO: 46.8 FL (ref 37–54)
EOSINOPHIL # BLD AUTO: 0.09 10*3/MM3 (ref 0–0.4)
EOSINOPHIL NFR BLD AUTO: 1.9 % (ref 0.3–6.2)
ERYTHROCYTE [DISTWIDTH] IN BLOOD BY AUTOMATED COUNT: 21.6 % (ref 12.3–15.4)
HCT VFR BLD AUTO: 46.7 % (ref 37.5–51)
HGB BLD-MCNC: 14.6 G/DL (ref 13–17.7)
IMM GRANULOCYTES # BLD AUTO: 0.06 10*3/MM3 (ref 0–0.05)
IMM GRANULOCYTES NFR BLD AUTO: 1.2 % (ref 0–0.5)
LYMPHOCYTES # BLD AUTO: 1.05 10*3/MM3 (ref 0.7–3.1)
LYMPHOCYTES NFR BLD AUTO: 21.8 % (ref 19.6–45.3)
MCH RBC QN AUTO: 24.3 PG (ref 26.6–33)
MCHC RBC AUTO-ENTMCNC: 31.3 G/DL (ref 31.5–35.7)
MCV RBC AUTO: 77.6 FL (ref 79–97)
MONOCYTES # BLD AUTO: 0.53 10*3/MM3 (ref 0.1–0.9)
MONOCYTES NFR BLD AUTO: 11 % (ref 5–12)
NEUTROPHILS NFR BLD AUTO: 3.07 10*3/MM3 (ref 1.7–7)
NEUTROPHILS NFR BLD AUTO: 63.9 % (ref 42.7–76)
NRBC BLD AUTO-RTO: 0 /100 WBC (ref 0–0.2)
PLATELET # BLD AUTO: 156 10*3/MM3 (ref 140–450)
PMV BLD AUTO: 9.9 FL (ref 6–12)
RBC # BLD AUTO: 6.02 10*6/MM3 (ref 4.14–5.8)
WBC NRBC COR # BLD: 4.81 10*3/MM3 (ref 3.4–10.8)

## 2023-06-15 PROCEDURE — 36415 COLL VENOUS BLD VENIPUNCTURE: CPT

## 2023-06-15 PROCEDURE — 85025 COMPLETE CBC W/AUTO DIFF WBC: CPT

## 2023-06-15 NOTE — PROGRESS NOTES
Subjective     REASON FOR FOLLOW-UP: Polycythemia vera, JAK2 mutation detected    HISTORY OF PRESENT ILLNESS:  Mr. Santoro is a 77 y.o. male with the above-mentioned history, who returns the office today for 2-week follow-up and lab review.  He currently continues on hydroxyurea 500 mg daily.  This was reduced after significant decline of his counts after the initiation of the 1000 mg.  He continues to tolerate Hydrea well.  He denies mouth sores or nausea.  He struggles with neuropathy which has been extensively evaluated by multiple specialties and present prior to initiation of hydroxyurea.    Hematologic/oncologic history:     The patient is a 77-year-old male followed with a number of issues including coronary artery disease, status post CABG, atrial flutter, previous CVA hyperlipidemia, GE reflux, carotid vascular disease, and hypertension.     He had been seen by vascular 2/25/2022 with mild progression of carotid disease but otherwise stable and also had follow-up with pain management for back pain 3/28/2022 requiring chronic narcotic therapy.  Patient has been resistant to epidural like procedures.     Unfortunately has had concurrent constipation requiring laxatives and additional opioid antagonist to reduce GI hypomotility.  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     Patient recently reviewed again by pain management with worsening pain.  Patient also saw GI periodically undergoing a follow-up MRI of the abdomen 8/9/2022 with scattered pancreatic cystic lesions unchanged from previous.  He had previously undergone EGD and colonoscopy 11/5/2021 with irregular Z-line and biopsies taken in nonbleeding internal hemorrhoids found during retroflexion that were small.  There are scattered small and large mouth diverticula found in the sigmoid colon  descending colon and splenic flexure.       The patient now presents for thrombocytosis with review of his record over the last year demonstrating a platelet count that is escalated from 3-400,000 now to 8/11/2022 648,000 but concurrent microcytic and hypochromic indices.  These indices have been slowly reducing over the last 2 years approximately followed more closely.        These findings are discussed with the patient 8/16/2022 who indicates that he actually feels about the same though still has issues with back pain.  He is noted no change in bowel habit including, fortunately, improvement of his constipation without the use of additional medication such as Movantik.  He has not noted any change in the color of his stool including dark stools or any blood per rectum, urine though he does note periodic excess bruising from his anticoagulation therapy.       The patient moved to a trial of iron which, unfortunately, worsened his constipation in which he had discontinue. He had further issues in early September with left renal stone, requiring cystoscopy, stent placement 8/23/2022.       He is next seen back 9/28/2022 with repeat studies performed 9/21 demonstrating 5% iron saturation, ferritin of 12.5.  He remains further weight and fatigue, again oral iron intolerant and will require IV iron preparations for his iron deficiency anemia.      The patient is next seen 1/18/2023 with considerable improvement in his testing including H&H now 17.2 and 55.4 though with iron saturation of 8% and ferritin 26.5.  He has not noted any blood loss but remains generally weak and with ongoing periodic abdominal pain.  His major concerns continue to be a disequilibrium since his previous CVA symptoms.  He has not been seen in follow-up by neurology.  He continues to have hematuria by urinary assessment but not gross findings.  We have discussed that he may actually have a myeloproliferative disorder and requested that he  "undergo an assessment for JAK2 analysis today.    He is seen back 4/17/2023JAK  2-V617 F mutation having been detected.  In the interval he was admitted 2/19-21/23 for weakness, fall and ER evaluation not demonstrate any acute intracranial process, CT of lumbar spine with lumbar degenerative disease and other studies not show any acute abnormalities.  Fortunately he went on to improve though his wife had a positive COVID test recently on home examination.  His follow-up testing 4/10/2023 include an H&H of 18.3 and 60.9 white count of 14,700 platelet count of 477,000.    He is seen 4/17/2023 and we discussed that he is better served with phlebotomy at this point.  He states he feels so poorly that he is quite willing to try to move to additional therapies including phlebotomy.    Patient initiating Hydrea 1000 mg daily as of 5/19/2023.      Past Medical History:   Diagnosis Date    Anxiety     Arthritis     Atrial flutter     Status post cavotricuspid isthmus ablation by Dr. Gustafson on 4/18/17    Mandel esophagus     Benign essential hypertension     CAD (coronary artery disease)     3 vessel CABG 4/11/17 by Dr. Cortez: ROSARIO-prox LAD, SVG-OM1, SVG-OM3    Carotid artery disease     Status post carotid endarterectomy - [facility] 4/10/17: 50-59% NICHELLE, 1-15% LICA.     Colonic polyp     Cyst of pancreas     DDD (degenerative disc disease), lumbosacral     GERD (gastroesophageal reflux disease)     H/O bone density study 2013    H/O complete eye exam 2014    History of kidney stone     HLD (hyperlipidemia)     Hypertension     Kidney stone     8/22/22    Lipid screening 05/31/2013    Low back pain     physical therapy [facility] 5-12-10    Screening for prostate cancer 07/07/2015    Skin cancer     nose    Stroke     RESIDUAL--\"BALANCE ISSUES\"        Past Surgical History:   Procedure Laterality Date    CARDIAC CATHETERIZATION N/A 04/10/2017    Procedure: Left Heart Cath;  Surgeon: Marjorie Healy MD;  Location: [facility]" CATH INVASIVE LOCATION;  Service:     CARDIAC CATHETERIZATION N/A 04/10/2017    Procedure: Coronary angiography;  Surgeon: Marjorie Healy MD;  Location: [facility] CATH INVASIVE LOCATION;  Service:     CARDIAC CATHETERIZATION N/A 04/10/2017    Procedure: Left ventriculography;  Surgeon: Marjorie Healy MD;  Location: [facility] CATH INVASIVE LOCATION;  Service:     CARDIAC CATHETERIZATION  2011    CARDIAC ELECTROPHYSIOLOGY PROCEDURE N/A 04/18/2017    Procedure: Ablation atrial flutter;  Surgeon: Jose Antonio Gustafson MD;  Location: [facility] CATH INVASIVE LOCATION;  Service:     CAROTID ENDARTERECTOMY      CHOLECYSTECTOMY      CHOLECYSTECTOMY WITH INTRAOPERATIVE CHOLANGIOGRAM N/A 09/07/2019    Procedure: Laparoscopic cholecystectomy with intraoperative cholangiogram;  Surgeon: Gauri Travis MD;  Location: [facility] OR;  Service: General    COLONOSCOPY  01/06/2015    Diverticulosis, one TA    COLONOSCOPY N/A 02/14/2019    tics, NBIH, adenomatous polyp x 2    COLONOSCOPY N/A 11/05/2021    Procedure: COLONOSCOPY TO CECUM AND TERM. ILEUM WITH COLD POLYPECTOMIES;  Surgeon: Everton Abel MD;  Location: [facility] ENDOSCOPY;  Service: Gastroenterology;  Laterality: N/A;  PRE OP - PERS H/O POLYPS  POST OP - COLON POLYPS,, DIVERTICULOSIS, HEMORRHOIDS    CORONARY ARTERY BYPASS GRAFT N/A 04/11/2017    Procedure: AR STERNOTOMY CORONARY ARTERY BYPASS GRAFT TIMES 3 USING LEFT INTERNAL MAMMARY ARTERY AND LEFT GREATER SAPHENOUS VEIN GRAFT PER ENDOSCOPIC VEIN HARVESTING AND PRP ;  Surgeon: Temo Cortez MD;  Location: [facility] OR;  Service:     ENDOSCOPY  01/06/2015    HH, Ervin's esophagus    ENDOSCOPY N/A 02/14/2019    Z line irregular, HH, Ervin's esophagus    ENDOSCOPY N/A 11/05/2021    Procedure: ESOPHAGOGASTRODUODENOSCOPY WITH BIOPSIES;  Surgeon: Everton Abel MD;  Location: [facility] ENDOSCOPY;  Service: Gastroenterology;  Laterality: N/A;  PRE OP - PERS H/O ERVIN'S  POST OP - IRREG Z LINE    KNEE SURGERY Left      URETEROSCOPY LASER LITHOTRIPSY WITH STENT INSERTION Left 8/23/2022    Procedure: Cysto retrograde with left uretro stent placement;  Surgeon: Damien Oliveira MD;  Location: [facility];  Service: Urology;  Laterality: Left;    VASECTOMY          Current Outpatient Medications on File Prior to Visit   Medication Sig Dispense Refill    allopurinol (Zyloprim) 300 MG tablet Take 1 tablet by mouth Daily. 90 tablet 1    amitriptyline (ELAVIL) 50 MG tablet Take 1 tablet by mouth Every Night. 30 tablet 3    amLODIPine (NORVASC) 2.5 MG tablet Take 1 tablet by mouth Daily. 90 tablet 3    apixaban (ELIQUIS) 5 MG tablet tablet Take 1 tablet by mouth 2 (Two) Times a Day. 90 tablet 3    furosemide (LASIX) 20 MG tablet TAKE ONE TABLET BY MOUTH DAILY 7 tablet 1    HYDROcodone-acetaminophen (NORCO)  MG per tablet Take 1 tablet by mouth Every 8 (Eight) Hours As Needed for Severe Pain. 30 day supply 75 tablet 0    hydroxyurea (HYDREA) 500 MG capsule Take 2 capsules by mouth Daily. 60 capsule 5    mupirocin (BACTROBAN) 2 % cream Apply 1 application topically to the appropriate area as directed 2 (Two) Times a Day. 15 g 0    Myrbetriq 25 MG tablet sustained-release 24 hour 24 hr tablet Take 1 tablet by mouth Daily.      pantoprazole (PROTONIX) 40 MG EC tablet Take 1 tablet by mouth Daily. 90 tablet 1    potassium chloride 10 MEQ CR tablet TAKE ONE TABLET BY MOUTH DAILY 7 tablet 1    ramipril (ALTACE) 5 MG capsule Take 1 capsule by mouth Daily. 90 capsule 1    rosuvastatin (Crestor) 20 MG tablet Take 1 tablet by mouth Daily. 90 tablet 1     Current Facility-Administered Medications on File Prior to Visit   Medication Dose Route Frequency Provider Last Rate Last Admin    cyanocobalamin injection 1,000 mcg  1,000 mcg Intramuscular Q30 Days Damian Sanchez MD   1,000 mcg at 04/06/23 0957        ALLERGIES:    Allergies   Allergen Reactions    Atorvastatin Myalgia     Myalgia    Penicillins Hives, Swelling and Rash     Tolerates  "cephalosporins         Social History     Socioeconomic History    Marital status:      Spouse name: Brooke    Years of education: 9th grade   Tobacco Use    Smoking status: Former     Packs/day: 1.00     Types: Cigarettes     Start date: 1959     Quit date: 2009     Years since quittin.4    Smokeless tobacco: Never    Tobacco comments:     CAFFEINE USE   Vaping Use    Vaping Use: Never used   Substance and Sexual Activity    Alcohol use: Not Currently     Comment: rarely    Drug use: No    Sexual activity: Defer     Partners: Female        Family History   Problem Relation Age of Onset    Hypertension Mother     Heart disease Mother     Heart attack Mother     Stroke Mother     Heart disease Father     Heart attack Father     Stroke Father     Hypertension Sister     Heart attack Brother     Heart disease Brother     No Known Problems Brother     Heart disease Brother     Heart attack Brother     Diabetes Brother     No Known Problems Maternal Grandmother     No Known Problems Maternal Grandfather     No Known Problems Paternal Grandmother     No Known Problems Paternal Grandfather     Pancreatic cancer Paternal Cousin     Arthritis Other     Diabetes Other     Hypertension Other     Kidney disease Other         stones    Malig Hyperthermia Neg Hx         Review of Systems   As per HPI    Objective     Vitals:    06/15/23 1300   BP: 135/74   Pulse: 75   Resp: 18   Temp: 98.4 °F (36.9 °C)   TempSrc: Temporal   SpO2: 96%   Weight: 75 kg (165 lb 4.8 oz)   Height: 172.7 cm (67.99\")   PainSc:   8   PainLoc: Generalized         6/15/2023    12:58 PM   Current Status   ECOG score 0     Physical Exam  Vitals reviewed.   Constitutional:       General: He is not in acute distress.     Appearance: Normal appearance. He is well-developed.   HENT:      Head: Normocephalic and atraumatic.   Eyes:      Pupils: Pupils are equal, round, and reactive to light.   Cardiovascular:      Rate and Rhythm: Normal rate " and regular rhythm.      Heart sounds: Normal heart sounds. No murmur heard.  Pulmonary:      Effort: Pulmonary effort is normal. No respiratory distress.      Breath sounds: Normal breath sounds.   Abdominal:      Palpations: Abdomen is soft.   Musculoskeletal:         General: Normal range of motion.      Cervical back: Normal range of motion.   Skin:     General: Skin is warm and dry.      Findings: No rash.   Neurological:      Mental Status: He is alert and oriented to person, place, and time.     I have reexamined the patient and the results are consistent with the previously documented exam. MY Anderson       RECENT LABS:  Results from last 7 days   Lab Units 06/15/23  1248   WBC 10*3/mm3 4.81   NEUTROS ABS 10*3/mm3 3.07   HEMOGLOBIN g/dL 14.6   HEMATOCRIT % 46.7   PLATELETS 10*3/mm3 156                   Assessment & Plan      77 y.o. male  with medical issues including coronary artery disease, status post CABG, atrial flutter status post ablation, previous CVA, hyperlipidemia, GE reflux, cardiovascular disease and hypertension.  He also has chronic back pain followed by pain management requiring chronic narcotic therapy.  His medical issues per tickly cardiovascular and CVA led to eventual placement on aspirin and Eliquis chronically.  He has additionally been seen by GI including review of scattered cystic lesions in the pancreas and EGD colonoscopy as recently as November 2021.      *Polycythemia vera  The patient now presents for thrombocytosis with review of his record over the last year demonstrating a platelet count that is escalated from 3-400,000 now to 8/11/2022 648,000 but concurrent microcytic and hypochromic indices.  These indices have been slowly reducing over the last 2 years approximately followed more closely.  Concurrently is a mild drop in his baseline hemoglobin still well within normal limits.  thrombocytosis thought, initially, to be related to iron deficiency.   Ferritin found to be 16.3 and iron of 26 with 5% saturation and TIBC 511.   The patient was placed on ferrous gluconate which, unfortunately, he was unable to tolerate with worsening constipation.  He had further issues in early September with left renal stone, requiring cystoscopy, stent placement 8/23/2022.  9/28/2022 with repeat studies performed 9/21 demonstrating 5% iron saturation, ferritin of 12.5.  He remains further weight and fatigue, again oral iron intolerant and will require IV iron preparations for his iron deficiency anemia.  We discontinued oral iron and proceeded with Injectafer given 9/28/2022 in 10/5/2022  4/17/2023JAK  2-V617 F mutation having been detected.    In the interval he was admitted 2/19-21/23 for weakness, fall and ER evaluation not demonstrate any acute intracranial process, CT of lumbar spine with lumbar degenerative disease and other studies not show any acute abnormalities.  Fortunately he went on to improve though his wife had a positive COVID test recently on home examination.  4/10/2023 include an H&H of 18.3 and 60.9 white count of 14,700 platelet count of 477,000.  5/15/2023 hemoglobin 15.3, hematocrit 50.4.  Reviewed with Dr. Lopez plans to hold off on phlebotomy and initiate Hydrea 1000 mg daily.  We will tentatively schedule him for return follow-up visit in 2 weeks with repeat labs and reassessment.  6/1/2020 2:23 weeks of Hydrea 1000 mg daily, WBC 5.0, hemoglobin 15.2, hematocrit 49.8, platelets 113,000.  Hydrea was reduced to 500 mg daily  Stability of counts today, 6/15/2023 on 5 mg daily with WBC 4.81, hemoglobin 14.6, hematocrit 46.7%, platelets 156,000    PLAN:  Continue Hydrea 500 mg daily  MD follow-up with Dr. Lopez in 2 weeks with CBC, CMP, toxicity check on Hydrea and further dose adjustments as needed    This patient is on high risk drug therapy requiring intensive monitoring for toxicity.        Nessa Vegas, MY  06/15/2023

## 2023-06-16 ENCOUNTER — SPECIALTY PHARMACY (OUTPATIENT)
Dept: PHARMACY | Facility: HOSPITAL | Age: 78
End: 2023-06-16
Payer: MEDICARE

## 2023-06-16 DIAGNOSIS — D75.839 THROMBOCYTOSIS: ICD-10-CM

## 2023-06-16 RX ORDER — HYDROXYUREA 500 MG/1
500 CAPSULE ORAL DAILY
Qty: 30 CAPSULE | Refills: 5 | Status: SHIPPED | OUTPATIENT
Start: 2023-06-16

## 2023-06-16 NOTE — PROGRESS NOTES
Re: Refills of Oral Specialty Medication - Hydrea (hydroxyurea)    • Drug-Drug Interactions: The current medication list was reviewed and there are no relevant drug-drug interactions.  • Medication Allergies: The patient has no relevant allergies as it relates to their oral specialty medication  • Review of Labs/Dose Adjustments: DOSE CHANGE - I reviewed the most recent note and labs. Due to side effects the dose is being decreased. I sent refills as described below.    Drug: Hydrea (hydroxyurea)  Strength: 500 mg  Directions: Take 1 capsule by mouth daily  Quantity: 30  Refills: 5  Pharmacy prescription sent to: [facility]    Name/Credentials: Jerad Vicente PharmD, JELLY    6/16/2023  10:06 EDT       Completed independent double check on medication order/RX. Noy Cortés RPh, BCOP

## 2023-06-30 NOTE — PROGRESS NOTES
Acute Care - Physical Therapy Treatment Note  [facility]     Patient Name: Madhu Santoro  : 1945  MRN: [id number]  Today's Date: 2017  Onset of Illness/Injury or Date of Surgery Date: 17          Admit Date: 2017    Visit Dx:    ICD-10-CM ICD-9-CM   1. New onset atrial flutter I48.92 427.32   2. Coronary artery disease involving native coronary artery of native heart without angina pectoris I25.10 414.01   3. Generalized weakness R53.1 780.79   4. S/P CABG x 3 Z95.1 V45.81     Patient Active Problem List   Diagnosis   • Anxiety   • Arthritis   • CAD (coronary artery disease)   • HLD (hyperlipidemia)   • Benign essential hypertension   • DDD (degenerative disc disease), lumbosacral   • Stroke   • Screening for prostate cancer   • Hypertension   • Gastroesophageal reflux disease   • Hyperlipidemia   • New onset atrial flutter               Adult Rehabilitation Note       17 0921 04/15/17 0922 17 1356    Rehab Assessment/Intervention    Discipline physical therapy assistant  -MM physical therapy assistant  -MM occupational therapist  -LE    Document Type therapy note (daily note)  -MM therapy note (daily note)  -MM therapy note (daily note)  -LE    Subjective Information no complaints;agree to therapy  -MM agree to therapy  -MM agree to therapy  -LE    Patient Effort, Rehab Treatment good  -MM good  -MM good  -LE    Symptoms Noted During/After Treatment none  -MM none  -MM none  -LE    Precautions/Limitations cardiac precautions;fall precautions  -MM cardiac precautions;fall precautions  -MM cardiac precautions;fall precautions;oxygen therapy device and L/min   heart hugger  -LE    Recorded by [MM] Yandy Brower PTA [MM] Yandy Brower PTA [LE] Alissa Upton OTR    Vital Signs    Pre Systolic BP Rehab   115  -LE    Pre Treatment Diastolic BP   84  -LE    Pretreatment Heart Rate (beats/min)   130  -LE    Pre SpO2 (%)   94  -LE    O2 Delivery Pre Treatment   supplemental O2   -LE    O2 Delivery Intra Treatment   room air  -LE    Post SpO2 (%)   96  -LE    O2 Delivery Post Treatment   supplemental O2  -LE    Pre Patient Position   Supine  -LE    Intra Patient Position   Standing  -LE    Post Patient Position   Supine  -LE    Recorded by   [LE] JOSE LUIS Martinez    Pain Assessment    Pain Assessment No/denies pain  -MM No/denies pain  -MM No/denies pain  -LE    Recorded by [MM] Yandy Brower PTA [MM] Yandy Brower PTA [LE] Alissa Upton OTR    Bed Mobility, Assessment/Treatment    Bed Mob, Supine to Sit, [place] not tested  -MM not tested  -MM supervision required;verbal cues required  -LE    Bed Mob, Sit to Supine, [place] not tested  -MM not tested  -MM supervision required;verbal cues required  -LE    Bed Mobility, Comment up in chair  -MM up in chair  -MM     Recorded by [MM] Yandy Brower PTA [MM] Yandy Brower PTA [LE] JOSE LUIS Martinez    Transfer Assessment/Treatment    Transfers, Sit-Stand [place] supervision required  -MM stand by assist  -MM stand by assist  -LE    Transfers, Stand-Sit [place] supervision required  -MM stand by assist  -MM stand by assist  -LE    Recorded by [MM] Yandy Brower PTA [MM] Yandy Brower PTA [LE] JOSE LUIS Martinez    Gait Assessment/Treatment    Gait, [place] Level stand by assist  -MM stand by assist  -MM     Gait, Distance (Feet) 480  -  -MM     Gait, Comment pt still has IV; pt stated he is scheduled for a procedure tomorrow.  -MM      Recorded by [MM] Yandy Brower PTA [MM] Yandy Brower PTA     Functional Mobility    Functional Mobility- Ind. Level   supervision required  -LE    Functional Mobility-Distance (Feet)   15  -LE    Recorded by   [LE] JOSE LUIS Martinez    Grooming Assessment/Training    Grooming Assess/Train, Position   sink side;standing  -LE    Grooming Assess/Train, Indepen Level   set up required  -LE    Recorded by   [LE] JOSE LUIS Martinez    Balance Skills Training     Sitting-Level of Assistance   Independent  -LE    Standing-Level of Assistance   Distant supervision  -LE    Recorded by   [LE] Alissa Upton OTR    Positioning and Restraints    Pre-Treatment Position sitting in chair/recliner  -MM sitting in chair/recliner  -MM in bed  -LE    Post Treatment Position chair  -MM chair  -MM bed  -LE    In Bed   notified nsg;call light within reach;encouraged to call for assist;heels elevated  -LE    In Chair reclined;call light within reach;encouraged to call for assist  -MM reclined;call light within reach;encouraged to call for assist  -MM     Recorded by [MM] Yandy Brower PTA [MM] Yandy Brower PTA [LE] JOSE LUIS Martinez      04/14/17 0930 04/13/17 1008       Rehab Assessment/Intervention    Discipline physical therapist  -PC,CH,PC2 physical therapist  -PC,CH,PC2     Document Type therapy note (daily note)  -PC,CH,PC2 therapy note (daily note)  -PC,CH,PC2     Subjective Information agree to therapy;no complaints  -PC,CH,PC2 agree to therapy;complains of;pain  -PC,CH,PC2     Patient Effort, Rehab Treatment adequate  -PC,CH,PC2 adequate  -PC,CH,PC2     Symptoms Noted During/After Treatment none  -PC,CH,PC2 shortness of breath  -PC,CH,PC2     Precautions/Limitations cardiac precautions;fall precautions;oxygen therapy device and L/min;sternal precautions   1.5L/min  -PC,CH,PC2 cardiac precautions;fall precautions;oxygen therapy device and L/min;sternal precautions   2L/min  -PC,CH,PC2     Recorded by [PC,CH,PC2] Naomi Tucker, PT (r) Miguel Stephenson, PT Student (t) Naomi Tucker PT (c) [PC,CH,PC2] Naomi Tucker PT (r) Miguel Stephenson PT Student (t) Naomi Tucker, PT (c)     Vital Signs    Pre Systolic BP Rehab 116  -PC,CH,PC2 129  -PC,CH,PC2     Pre Treatment Diastolic BP 78  -PC,CH,PC2 77  -PC,CH,PC2     Pretreatment Heart Rate (beats/min) 134  -PC,CH,PC2 113  -PC,CH,PC2     Posttreatment Heart Rate (beats/min) 135  -PC,CH,PC2 113  -PC,CH,PC2     Pre SpO2 (%) 92  -PC,CH,PC2  93  -PC,CH,PC2     O2 Delivery Pre Treatment supplemental O2  -PC,CH,PC2 supplemental O2  -PC,CH,PC2     Post SpO2 (%) 95  -PC,CH,PC2 94  -PC,CH,PC2     O2 Delivery Post Treatment supplemental O2  -PC,CH,PC2 supplemental O2  -PC,CH,PC2     Recorded by [PC,CH,PC2] Naomi Tucker PT (r) Miguel Stephenson PT Student (t) Naomi Tucker PT (c) [PC,CH,PC2] Naomi Tucker PT (r) Miguel Stephenson PT Student (t) Naomi Tucker PT (c)     Pain Assessment    Pain Assessment No/denies pain  -PC,CH,PC2 0-10  -PC,CH,PC2     Pain Score  5  -PC,CH,PC2     Pain Location  Back  -PC,CH,PC2     Pain Intervention(s)  Repositioned  -PC,CH,PC2     Recorded by [PC,CH,PC2] Naomi Tucker PT (r) Miguel Stephenson PT Student (t) Naomi Tucker PT (c) [PC,CH,PC2] Naomi Tucker PT (r) Miguel Stephenson PT Student (t) Naomi Tucker PT (c)     Cognitive Assessment/Intervention    Current Cognitive/Communication Assessment functional  -PC,CH,PC2 functional  -PC,CH,PC2     Orientation Status oriented x 4  -PC,CH,PC2 oriented x 4  -PC,CH,PC2     Follows Commands/Answers Questions 100% of the time;able to follow single-step instructions  -PC,CH,PC2 100% of the time;able to follow single-step instructions  -PC,CH,PC2     Personal Safety WNL/WFL  -PC,CH,PC2 WNL/WFL  -PC,CH,PC2     Personal Safety Interventions fall prevention program maintained;nonskid shoes/slippers when out of bed  -PC,CH,PC2 fall prevention program maintained;nonskid shoes/slippers when out of bed  -PC,CH,PC2     Recorded by [PC,CH,PC2] Naomi Tucker, PT (r) Miguel Stephenson PT Student (t) Naomi Tucker PT (c) [PC,CH,PC2] Naomi Tucker PT (r) Miguel Stephenson PT Student (t) Naomi G Garrett, PT (c)     Bed Mobility, Assessment/Treatment    Bed Mob, Supine to Sit, [employer] not tested  -PC,CH,PC2 not tested  -PC,CH,PC2     Bed Mob, Sit to Supine, [employer] not tested  -PC,CH,PC2 not tested  -PC,CH,PC2     Bed Mobility, Comment up in chair  -PC,CH,PC2 up in chair  -PC,CH,PC2      Recorded by [PC,CH,PC2] Naomi Tucker PT (r) Miguel Stephenson PT Student (t) Naomi Tucker PT (c) [PC,CH,PC2] Naomi Tucker PT (r) Miguel Stephenson PT Student (t) Naomi Tucker PT (c)     Transfer Assessment/Treatment    Transfers, Sit-Stand [place] stand by assist  -PC,CH,PC2 moderate assist (50% patient effort)  -PC,CH,PC2     Transfers, Stand-Sit [place] stand by assist  -PC,CH,PC2 minimum assist (75% patient effort);2 person assist required  -PC,CH,PC2     Transfer, Impairments  strength decreased  -PC,CH,PC2     Recorded by [PC,CH,PC2] Naomi Tucker PT (r) Miguel Stephenson PT Student (t) Naomi Tucker PT (c) [PC,CH,PC2] Naomi Tucker PT (r) Miguel Stephenson PT Student (t) Naomi Tucker PT (c)     Gait Assessment/Treatment    Gait, [place] Level minimum assist (75% patient effort)  -PC,CH,PC2 minimum assist (75% patient effort);2 person assist required  -PC,CH,PC2     Gait, Distance (Feet) 150  -PC,CH,PC2 70  -PC,CH,PC2     Gait, Gait Deviations  step length decreased;jemal decreased  -PC,CH,PC2     Gait, Safety Issues supplemental O2  -PC,CH,PC2 supplemental O2;step length decreased  -PC,CH,PC2     Gait, Impairments strength decreased  -PC,CH,PC2 strength decreased  -PC,CH,PC2     Recorded by [PC,CH,PC2] Naomi Tucker, PT (r) Miguel Stephenson PT Student (t) Naomi Tucker PT (c) [PC,CH,PC2] Naomi Tucker PT (r) Miguel Stephenson PT Student (t) Naomi Tucker PT (c)     Therapy Exercises    Exercise Protocols --   level IV cardiac protocol 10 reps each  -PC,CH,PC2 --   level III cardiac protocol 5 reps each  -PC,CH,PC2     Recorded by [PC,CH,PC2] Naomi Tucker PT (r) Miguel Stephenson PT Student (t) Naomi G Garrett, PT (c) [PC,CH,PC2] Naomi Tucker, PT (r) Miguel Stephenson, PT Student (t) Naomi Tucker, PT (c)     Positioning and Restraints    Pre-Treatment Position sitting in chair/recliner  -PC,CH,PC2 sitting in chair/recliner  -PC,CH,PC2     Post Treatment Position chair   -PC,CH,PC2      In Chair sitting;call light within reach;encouraged to call for assist;with family/caregiver  -PC,CH,PC2 reclined;call light within reach;encouraged to call for assist;with family/caregiver  -PC,CH,PC2     Recorded by [PC,CH,PC2] Naomi Tucker, PT (r) Miguel Stephenson, PT Student (t) Naomi Tucker, PT (c) [PC,CH,PC2] Naomi uTcker, PT (r) Miguel Stephenson, PT Student (t) Naomi Tucker, PT (c)       User Key  (r) = Recorded By, (t) = Taken By, (c) = Cosigned By    Initials Name Effective Dates    VELMA Alissamarty Upton, OTR 04/13/15 -     PC Naomi Tucker, PT 12/01/15 -     MM Yandy Brower, PTA 02/18/16 -     CH Miguel Stephenson, PT Student 01/31/17 -                 IP PT Goals       04/12/17 0858          Cardiopulmonary PT LTG    Cardiopulmonary PT LTG, Date Established 04/12/17  -EM      Cardiopulmonary PT LTG, Time to Achieve 1 wk  -EM      Cardiopulmonary PT LTG, Level Level V  -EM        User Key  (r) = Recorded By, (t) = Taken By, (c) = Cosigned By    Initials Name Provider Type    EM Susan Hurtado, PT Physical Therapist          Physical Therapy Education     Title: PT OT SLP Therapies (Active)     Topic: Physical Therapy (Active)     Point: Mobility training (Done)    Learning Progress Summary    Learner Readiness Method Response Comment Documented by Status   Patient Acceptance DESIREE AVILA  MM 04/16/17 0924 Done    Acceptance E,MARGARITA BAKER   04/14/17 0936 Done    Acceptance E NR   04/13/17 1013 Active    Acceptance E NR  EM 04/12/17 0857 Active   Family Acceptance E,D MARGARITA CASTILLO   04/14/17 0936 Done    Acceptance E NR   04/13/17 1013 Active               Point: Home exercise program (Active)    Learning Progress Summary    Learner Readiness Method Response Comment Documented by Status   Patient Acceptance E NR  EM 04/12/17 0857 Active               Point: Body mechanics (Done)    Learning Progress Summary    Learner Readiness Method Response Comment Documented by Status   Patient Acceptance E MARGARITA   Quality 226: Preventive Care And Screening: Tobacco Use: Screening And Cessation Intervention: Patient screened for tobacco use and is an ex/non-smoker  04/15/17 0924 Done                      User Key     Initials Effective Dates Name Provider Type Discipline    EM 12/01/15 -  Susan Hurtado, PT Physical Therapist PT     02/18/16 -  Yandy Brower, PTA Physical Therapy Assistant PT     01/31/17 -  Miguel Stephenson, PT Student PT Student PT                    PT Recommendation and Plan  Anticipated Discharge Disposition: home with assist  Planned Therapy Interventions: bed mobility training, gait training, home exercise program  PT Frequency: daily  Plan of Care Review  Plan Of Care Reviewed With: patient  Progress: improving  Outcome Summary/Follow up Plan: pt required less assistance for all functional mobiltiy task; pt still with increased HR.          Outcome Measures       04/16/17 0900 04/15/17 0900 04/14/17 1359    How much help from another person do you currently need...    Turning from your back to your side while in flat bed without using bedrails? 3  -MM 3  -MM     Moving from lying on back to sitting on the side of a flat bed without bedrails? 3  -MM 3  -MM     Moving to and from a bed to a chair (including a wheelchair)? 4  -MM 3  -MM     Standing up from a chair using your arms (e.g., wheelchair, bedside chair)? 4  -MM 4  -MM     Climbing 3-5 steps with a railing? 3  -MM 3  -MM     To walk in hospital room? 4  -MM 4  -MM     AM-PAC 6 Clicks Score 21  -MM 20  -MM     How much help from another is currently needed...    Putting on and taking off regular lower body clothing?   3  -LE    Bathing (including washing, rinsing, and drying)   3  -LE    Toileting (which includes using toilet bed pan or urinal)   3  -LE    Putting on and taking off regular upper body clothing   3  -LE    Taking care of personal grooming (such as brushing teeth)   3  -LE    Eating meals   4  -LE    Score   19  -LE      04/14/17 1300 04/14/17 0900 04/13/17 1059    How much help from another person do you currently need...    Turning from your back to your side while in  flat bed without using bedrails?  3  -PC (r) CH (t) PC (c)     Moving from lying on back to sitting on the side of a flat bed without bedrails?  3  -PC (r) CH (t) PC (c)     Moving to and from a bed to a chair (including a wheelchair)?  3  -PC (r) CH (t) PC (c)     Standing up from a chair using your arms (e.g., wheelchair, bedside chair)?  3  -PC (r) CH (t) PC (c)     Climbing 3-5 steps with a railing?  3  -PC (r) CH (t) PC (c)     To walk in hospital room?  3  -PC (r) CH (t) PC (c)     AM-PAC 6 Clicks Score  18  -PC (r) CH (t)     How much help from another is currently needed...    Putting on and taking off regular lower body clothing?   3  -MR    Bathing (including washing, rinsing, and drying)   3  -MR    Toileting (which includes using toilet bed pan or urinal)   3  -MR    Putting on and taking off regular upper body clothing   3  -MR    Taking care of personal grooming (such as brushing teeth)   3  -MR    Eating meals   4  -MR    Score   19  -MR    Functional Assessment    Outcome Measure Options AM-PAC 6 Clicks Daily Activity (OT)  -LE AM-PAC 6 Clicks Daily Activity (OT)  -PC (r) CH (t) PC (c) AM-PAC 6 Clicks Daily Activity (OT)  -MR      04/13/17 1000          How much help from another person do you currently need...    Turning from your back to your side while in flat bed without using bedrails? 3  -EM (r) CH (t) EM (c)      Moving from lying on back to sitting on the side of a flat bed without bedrails? 2  -EM (r) CH (t) EM (c)      Moving to and from a bed to a chair (including a wheelchair)? 3  -EM (r) CH (t) EM (c)      Standing up from a chair using your arms (e.g., wheelchair, bedside chair)? 3  -EM (r) CH (t) EM (c)      Climbing 3-5 steps with a railing? 2  -EM (r) CH (t) EM (c)      To walk in hospital room? 3  -EM (r) CH (t) EM (c)      AM-PAC 6 Clicks Score 16  -EM (r) CH (t)      Functional Assessment    Outcome Measure Options AM-PAC 6 Clicks Basic Mobility (PT)  -EM (r) CH (t) EM (c)         User Key  (r) = Recorded By, (t) = Taken By, (c) = Cosigned By    Initials Name Provider Type    VELMA Upton, OTR Occupational Therapist    PC Naomi Tucker, PT Physical Therapist    EM Susan Hurtado, PT Physical Therapist    MM Yandy Brower, PTA Physical Therapy Assistant    MR Deedee Mancia, OT Occupational Therapist    NORY Stephenson, PT Student PT Student           Time Calculation:         PT Charges       04/16/17 0925          Time Calculation    Start Time 0851  -MM      Stop Time 0901  -MM      Time Calculation (min) 10 min  -MM      PT Received On 04/16/17  -MM      PT - Next Appointment 04/17/17  -MM        User Key  (r) = Recorded By, (t) = Taken By, (c) = Cosigned By    Initials Name Provider Type    MARY ANN Brower PTA Physical Therapy Assistant          Therapy Charges for Today     Code Description Service Date Service Provider Modifiers Qty    08997796177 HC PT THER PROC EA 15 MIN 4/15/2017 Yandy Brower PTA GP 1    22570081297 HC PT THER PROC EA 15 MIN 4/16/2017 Yandy Brower PTA GP 1          PT G-Codes  Outcome Measure Options: AM-PAC 6 Clicks Daily Activity (OT)    Yandy Brower PTA  4/16/2017        Quality 431: Preventive Care And Screening: Unhealthy Alcohol Use - Screening: Patient not identified as an unhealthy alcohol user when screened for unhealthy alcohol use using a systematic screening method Detail Level: Detailed

## 2023-07-09 PROBLEM — M1A.9XX0 CHRONIC GOUT WITHOUT TOPHUS: Status: ACTIVE | Noted: 2023-07-09

## 2023-07-28 ENCOUNTER — LAB (OUTPATIENT)
Dept: LAB | Facility: HOSPITAL | Age: 78
End: 2023-07-28
Payer: MEDICARE

## 2023-07-28 DIAGNOSIS — D45 POLYCYTHEMIA VERA: Primary | ICD-10-CM

## 2023-07-28 LAB
BASOPHILS # BLD AUTO: 0.01 10*3/MM3 (ref 0–0.2)
BASOPHILS NFR BLD AUTO: 0.2 % (ref 0–1.5)
DEPRECATED RDW RBC AUTO: 81.1 FL (ref 37–54)
EOSINOPHIL # BLD AUTO: 0.05 10*3/MM3 (ref 0–0.4)
EOSINOPHIL NFR BLD AUTO: 0.9 % (ref 0.3–6.2)
ERYTHROCYTE [DISTWIDTH] IN BLOOD BY AUTOMATED COUNT: 29.8 % (ref 12.3–15.4)
HCT VFR BLD AUTO: 38.5 % (ref 37.5–51)
HGB BLD-MCNC: 12.7 G/DL (ref 13–17.7)
IMM GRANULOCYTES # BLD AUTO: 0.11 10*3/MM3 (ref 0–0.05)
IMM GRANULOCYTES NFR BLD AUTO: 1.9 % (ref 0–0.5)
LYMPHOCYTES # BLD AUTO: 1.36 10*3/MM3 (ref 0.7–3.1)
LYMPHOCYTES NFR BLD AUTO: 23.9 % (ref 19.6–45.3)
MCH RBC QN AUTO: 26.8 PG (ref 26.6–33)
MCHC RBC AUTO-ENTMCNC: 33 G/DL (ref 31.5–35.7)
MCV RBC AUTO: 81.4 FL (ref 79–97)
MONOCYTES # BLD AUTO: 0.38 10*3/MM3 (ref 0.1–0.9)
MONOCYTES NFR BLD AUTO: 6.7 % (ref 5–12)
NEUTROPHILS NFR BLD AUTO: 3.78 10*3/MM3 (ref 1.7–7)
NEUTROPHILS NFR BLD AUTO: 66.4 % (ref 42.7–76)
NRBC BLD AUTO-RTO: 0 /100 WBC (ref 0–0.2)
PLATELET # BLD AUTO: 158 10*3/MM3 (ref 140–450)
PMV BLD AUTO: 9.6 FL (ref 6–12)
RBC # BLD AUTO: 4.73 10*6/MM3 (ref 4.14–5.8)
WBC NRBC COR # BLD: 5.69 10*3/MM3 (ref 3.4–10.8)

## 2023-07-28 PROCEDURE — 36415 COLL VENOUS BLD VENIPUNCTURE: CPT

## 2023-07-28 PROCEDURE — 85025 COMPLETE CBC W/AUTO DIFF WBC: CPT

## 2023-08-03 ENCOUNTER — TELEPHONE (OUTPATIENT)
Dept: FAMILY MEDICINE CLINIC | Facility: CLINIC | Age: 78
End: 2023-08-03
Payer: MEDICARE

## 2023-08-09 ENCOUNTER — OFFICE VISIT (OUTPATIENT)
Dept: NEUROLOGY | Facility: CLINIC | Age: 78
End: 2023-08-09
Payer: MEDICARE

## 2023-08-09 VITALS
HEART RATE: 75 BPM | SYSTOLIC BLOOD PRESSURE: 144 MMHG | DIASTOLIC BLOOD PRESSURE: 70 MMHG | OXYGEN SATURATION: 98 % | BODY MASS INDEX: 25.7 KG/M2 | HEIGHT: 68 IN

## 2023-08-09 DIAGNOSIS — G60.9 PERIPHERAL NEUROPATHY, IDIOPATHIC: Primary | ICD-10-CM

## 2023-08-09 PROCEDURE — 3078F DIAST BP <80 MM HG: CPT | Performed by: PSYCHIATRY & NEUROLOGY

## 2023-08-09 PROCEDURE — 1160F RVW MEDS BY RX/DR IN RCRD: CPT | Performed by: PSYCHIATRY & NEUROLOGY

## 2023-08-09 PROCEDURE — 99212 OFFICE O/P EST SF 10 MIN: CPT | Performed by: PSYCHIATRY & NEUROLOGY

## 2023-08-09 PROCEDURE — 1159F MED LIST DOCD IN RCRD: CPT | Performed by: PSYCHIATRY & NEUROLOGY

## 2023-08-09 PROCEDURE — 3077F SYST BP >= 140 MM HG: CPT | Performed by: PSYCHIATRY & NEUROLOGY

## 2023-08-09 NOTE — LETTER
August 9, 2023       No Recipients    Patient: Madhu Santoro   YOB: 1945   Date of Visit: 8/9/2023     Dear Damian Sanchez MD:       Thank you for referring Madhu Santoro to me for evaluation. Below are the relevant portions of my assessment and plan of care.    If you have questions, please do not hesitate to call me. I look forward to following Madhu along with you.         Sincerely,        Mukesh Russell II, MD        CC:   No Recipients    Mukesh Russell II, MD  08/09/23 1332  Sign when Signing Visit  Chief Complaint   Patient presents with    Peripheral Neuropathy       Patient ID: Madhu Santoro is a 77 y.o. male.    HPI: I had the pleasure of seeing your patient today.  As you may know he is a 77-year-old gentleman here for the management of neuropathy.  He did have the EMG/nerve conduction study which did show evidence for a moderate to severe peripheral neuropathy.  At his last clinic visit we did order peripheral neuropathy labs however he has not yet had them performed.  He was experiencing significant amount of paresthesias in his feet primarily.  We increased his amitriptyline to 50 mg nightly.  That has been helpful for him.  He has had significant improvement of his neuropathic pain.  He says that at this time he only experiences mild symptoms on the balls of his feet bilaterally.    The following portions of the patient's history were reviewed and updated as appropriate: allergies, current medications, past family history, past medical history, past social history, past surgical history and problem list.    Review of Systems   Musculoskeletal:  Positive for back pain and gait problem.   Neurological:  Positive for weakness and numbness. Negative for dizziness, tremors, seizures, syncope, facial asymmetry, speech difficulty, light-headedness and headaches.    I have reviewed the review of systems above performed by my medical assistant.      Vitals:    08/09/23 1309   BP:  144/70   Pulse: 75   SpO2: 98%       Neurologic Exam     Mental Status   Oriented to person, place, and time.   Concentration: normal.   Level of consciousness: alert  Knowledge: consistent with education (No deficits found.).     Cranial Nerves     CN II   Visual fields full to confrontation.     CN III, IV, VI   Pupils are equal, round, and reactive to light.  Extraocular motions are normal.   CN III: no CN III palsy  CN VI: no CN VI palsy    CN V   Facial sensation intact.     CN VII   Facial expression full, symmetric.     CN VIII   CN VIII normal.     CN IX, X   CN IX normal.   CN X normal.     CN XI   CN XI normal.     CN XII   CN XII normal.     Motor Exam     Strength   Right neck flexion: 5/5  Left neck flexion: 5/5  Right neck extension: 5/5  Left neck extension: 5/5  Right deltoid: 5/5  Left deltoid: 5/5  Right biceps: 5/5  Left biceps: 5/5  Right triceps: 5/5  Left triceps: 5/5  Right wrist flexion: 5/5  Left wrist flexion: 5/5  Right wrist extension: 5/5  Left wrist extension: 5/5  Right interossei: 5/5  Left interossei: 5/5  Right abdominals: 5/5  Left abdominals: 5/5  Right iliopsoas: 5/5  Left iliopsoas: 5/5  Right quadriceps: 5/5  Left quadriceps: 5/5  Right hamstrin/5  Left hamstrin/5  Right glutei: 5/5  Left glutei: 5/5  Right anterior tibial: 5/5  Left anterior tibial: 5/5  Right posterior tibial: 5/5  Left posterior tibial: 5/5  Right peroneal: 5/5  Left peroneal: 5/5  Right gastroc: 5/5  Left gastroc: 5/5    Sensory Exam   Light touch normal.   Vibration normal.     Gait, Coordination, and Reflexes     Gait  Gait: normal    Reflexes   Right brachioradialis: 2+  Left brachioradialis: 2+  Right biceps: 2+  Left biceps: 2+  Right triceps: 2+  Left triceps: 2+  Right patellar: 2+  Left patellar: 2+  Right achilles: 2+  Left achilles: 2+  Right : 2+  Left : 2+Station is normal.     Physical Exam  Vitals reviewed.   Constitutional:       Appearance: He is well-developed.   HENT:       Head: Normocephalic and atraumatic.   Eyes:      Extraocular Movements: EOM normal.      Pupils: Pupils are equal, round, and reactive to light.   Cardiovascular:      Rate and Rhythm: Normal rate and regular rhythm.   Pulmonary:      Breath sounds: Normal breath sounds.   Musculoskeletal:         General: Normal range of motion.   Skin:     General: Skin is warm.   Neurological:      Mental Status: He is oriented to person, place, and time.      Gait: Gait is intact.      Deep Tendon Reflexes:      Reflex Scores:       Tricep reflexes are 2+ on the right side and 2+ on the left side.       Bicep reflexes are 2+ on the right side and 2+ on the left side.       Brachioradialis reflexes are 2+ on the right side and 2+ on the left side.       Patellar reflexes are 2+ on the right side and 2+ on the left side.       Achilles reflexes are 2+ on the right side and 2+ on the left side.      Procedures    Assessment/Plan: We are going to continue with the amitriptyline as scheduled.  Will see him back in 6 months or sooner if needed.  A total of 15 minutes was spent face-to-face with the patient today.  Of that greater than 50% of this time was spent discussing signs and symptoms of peripheral neuropathy, patient education, plan of care and prognosis.         Diagnoses and all orders for this visit:    1. Peripheral neuropathy, idiopathic (Primary)           Mkuesh Russell II, MD

## 2023-08-09 NOTE — PROGRESS NOTES
Chief Complaint   Patient presents with    Peripheral Neuropathy       Patient ID: Madhu Santoro is a 77 y.o. male.    HPI: I had the pleasure of seeing your patient today.  As you may know he is a 77-year-old gentleman here for the management of neuropathy.  He did have the EMG/nerve conduction study which did show evidence for a moderate to severe peripheral neuropathy.  At his last clinic visit we did order peripheral neuropathy labs however he has not yet had them performed.  He was experiencing significant amount of paresthesias in his feet primarily.  We increased his amitriptyline to 50 mg nightly.  That has been helpful for him.  He has had significant improvement of his neuropathic pain.  He says that at this time he only experiences mild symptoms on the balls of his feet bilaterally.    The following portions of the patient's history were reviewed and updated as appropriate: allergies, current medications, past family history, past medical history, past social history, past surgical history and problem list.    Review of Systems   Musculoskeletal:  Positive for back pain and gait problem.   Neurological:  Positive for weakness and numbness. Negative for dizziness, tremors, seizures, syncope, facial asymmetry, speech difficulty, light-headedness and headaches.    I have reviewed the review of systems above performed by my medical assistant.      Vitals:    08/09/23 1309   BP: 144/70   Pulse: 75   SpO2: 98%       Neurologic Exam     Mental Status   Oriented to person, place, and time.   Concentration: normal.   Level of consciousness: alert  Knowledge: consistent with education (No deficits found.).     Cranial Nerves     CN II   Visual fields full to confrontation.     CN III, IV, VI   Pupils are equal, round, and reactive to light.  Extraocular motions are normal.   CN III: no CN III palsy  CN VI: no CN VI palsy    CN V   Facial sensation intact.     CN VII   Facial expression full, symmetric.     CN  VIII   CN VIII normal.     CN IX, X   CN IX normal.   CN X normal.     CN XI   CN XI normal.     CN XII   CN XII normal.     Motor Exam     Strength   Right neck flexion: 5/5  Left neck flexion: 5/5  Right neck extension: 5/5  Left neck extension: 5/5  Right deltoid: 5/5  Left deltoid: 5/5  Right biceps: 5/5  Left biceps: 5/5  Right triceps: 5/5  Left triceps: 5/5  Right wrist flexion: 5/5  Left wrist flexion: 5/5  Right wrist extension: 5/5  Left wrist extension: 5/5  Right interossei: 5/5  Left interossei: 5/5  Right abdominals: 5/5  Left abdominals: 5/5  Right iliopsoas: 5/5  Left iliopsoas: 5/5  Right quadriceps: 5/5  Left quadriceps: 5/5  Right hamstrin/5  Left hamstrin/5  Right glutei: 5/5  Left glutei: 5/5  Right anterior tibial: 5/5  Left anterior tibial: 5/5  Right posterior tibial: 5/5  Left posterior tibial: 5/5  Right peroneal: 5/5  Left peroneal: 5/5  Right gastroc: 5/5  Left gastroc: 5/5    Sensory Exam   Light touch normal.   Vibration normal.     Gait, Coordination, and Reflexes     Gait  Gait: normal    Reflexes   Right brachioradialis: 2+  Left brachioradialis: 2+  Right biceps: 2+  Left biceps: 2+  Right triceps: 2+  Left triceps: 2+  Right patellar: 2+  Left patellar: 2+  Right achilles: 2+  Left achilles: 2+  Right : 2+  Left : 2+Station is normal.     Physical Exam  Vitals reviewed.   Constitutional:       Appearance: He is well-developed.   HENT:      Head: Normocephalic and atraumatic.   Eyes:      Extraocular Movements: EOM normal.      Pupils: Pupils are equal, round, and reactive to light.   Cardiovascular:      Rate and Rhythm: Normal rate and regular rhythm.   Pulmonary:      Breath sounds: Normal breath sounds.   Musculoskeletal:         General: Normal range of motion.   Skin:     General: Skin is warm.   Neurological:      Mental Status: He is oriented to person, place, and time.      Gait: Gait is intact.      Deep Tendon Reflexes:      Reflex Scores:       Tricep  reflexes are 2+ on the right side and 2+ on the left side.       Bicep reflexes are 2+ on the right side and 2+ on the left side.       Brachioradialis reflexes are 2+ on the right side and 2+ on the left side.       Patellar reflexes are 2+ on the right side and 2+ on the left side.       Achilles reflexes are 2+ on the right side and 2+ on the left side.      Procedures    Assessment/Plan: We are going to continue with the amitriptyline as scheduled.  Will see him back in 6 months or sooner if needed.  A total of 15 minutes was spent face-to-face with the patient today.  Of that greater than 50% of this time was spent discussing signs and symptoms of peripheral neuropathy, patient education, plan of care and prognosis.         Diagnoses and all orders for this visit:    1. Peripheral neuropathy, idiopathic (Primary)           Mukesh Russell II, MD

## 2023-08-14 ENCOUNTER — OFFICE VISIT (OUTPATIENT)
Dept: PAIN MEDICINE | Facility: CLINIC | Age: 78
End: 2023-08-14
Payer: MEDICARE

## 2023-08-14 VITALS
HEART RATE: 63 BPM | DIASTOLIC BLOOD PRESSURE: 60 MMHG | TEMPERATURE: 96.6 F | WEIGHT: 168.4 LBS | BODY MASS INDEX: 25.52 KG/M2 | SYSTOLIC BLOOD PRESSURE: 130 MMHG | HEIGHT: 68 IN | OXYGEN SATURATION: 100 % | RESPIRATION RATE: 18 BRPM

## 2023-08-14 DIAGNOSIS — M25.50 ARTHRALGIA OF MULTIPLE JOINTS: ICD-10-CM

## 2023-08-14 DIAGNOSIS — G89.29 CHRONIC PAIN OF BOTH HIPS: ICD-10-CM

## 2023-08-14 DIAGNOSIS — M25.552 CHRONIC PAIN OF BOTH HIPS: ICD-10-CM

## 2023-08-14 DIAGNOSIS — M47.816 LUMBAR FACET ARTHROPATHY: ICD-10-CM

## 2023-08-14 DIAGNOSIS — M25.551 CHRONIC PAIN OF BOTH HIPS: ICD-10-CM

## 2023-08-14 DIAGNOSIS — M54.50 CHRONIC BILATERAL LOW BACK PAIN WITHOUT SCIATICA: Primary | ICD-10-CM

## 2023-08-14 DIAGNOSIS — G89.29 CHRONIC BILATERAL LOW BACK PAIN WITHOUT SCIATICA: Primary | ICD-10-CM

## 2023-08-14 PROCEDURE — 99214 OFFICE O/P EST MOD 30 MIN: CPT

## 2023-08-14 PROCEDURE — 1160F RVW MEDS BY RX/DR IN RCRD: CPT

## 2023-08-14 PROCEDURE — 1125F AMNT PAIN NOTED PAIN PRSNT: CPT

## 2023-08-14 PROCEDURE — 3078F DIAST BP <80 MM HG: CPT

## 2023-08-14 PROCEDURE — 3075F SYST BP GE 130 - 139MM HG: CPT

## 2023-08-14 PROCEDURE — 1159F MED LIST DOCD IN RCRD: CPT

## 2023-08-14 PROCEDURE — 80305 DRUG TEST PRSMV DIR OPT OBS: CPT

## 2023-08-14 RX ORDER — HYDROCODONE BITARTRATE AND ACETAMINOPHEN 10; 325 MG/1; MG/1
1 TABLET ORAL EVERY 8 HOURS PRN
Qty: 75 TABLET | Refills: 0 | Status: SHIPPED | OUTPATIENT
Start: 2023-08-14

## 2023-08-14 NOTE — PROGRESS NOTES
"CHIEF COMPLAINT  Back and joint pain    Subjective   Madhu Santoro is a 77 y.o. male  who presents for follow-up.  He has a history of chronic low back and joint pain. He reports that his pain has remained consistent since his last office visit.     Today pain is 8/10VAS in severity. Pain is located in his lumbar spine and runs across his belt line. He continues to report pain in bilateral hands and numbness to bilateral feet. Describes this pain as a nearly continuous aching and throbbing. Pain is worsened by prolonged walking or standing, increased physical activity, and bending/twisitng. Pain improves with rest/reposition, use of a heating pad, and medication. He reports that he has tried PT in the past but this only caused increased pain.     Continues with Hydrocodone 10mg 2-3/day and OTC Tylenol PRN. Reports occasional constipation that is managed by Miralax and laxatives. Denies any other side effects from the regimen including somnolence. The regimen helps \"take the edge off\". Notes improvement in activity and function with regimen. ADL's by self. Denies any bowel or bladder changes. He also takes Elavil 50mg  at night for neuropathy pain (prescribed by Dr. Russell) and Allopurinol 300mg for gout (prescribed by Dr. Sanchez)     Not a candidate for NSAIDs - history of TIA's and remains on Eliquis     Continues to have no interest in interventional procedures due to a friend having developed foot drop following a procedure for back pain.     EMG/nerve conduction study ordered by Dr. Russell on 4/27/23 showed moderate to severe peripheral neuropathy.    Unable to tolerate Pregabalin - caused BLE swelling     Back Pain  This is a chronic problem. The current episode started more than 1 year ago. The problem occurs constantly. The problem has been waxing and waning since onset. The pain is present in the lumbar spine. The quality of the pain is described as aching (throbbing). Radiates to: to bilateral legs L > R  " The pain is at a severity of 7/10. The symptoms are aggravated by standing, sitting, twisting, bending, lying down and position (walking long distances, weather changes). Stiffness is present All day. Associated symptoms include numbness and weakness (hands/feet). Pertinent negatives include no abdominal pain, chest pain, dysuria, fever or headaches (feet). He has tried heat and analgesics (rest/reposition, PT in the past (this caused increased pain)) for the symptoms. The treatment provided mild relief.   Joint Pain  This is a chronic (bilateral hands, left shoulder, bilateral knees) problem. The current episode started more than 1 year ago. The problem occurs daily. The problem has been unchanged (unchanged since last office visit). Associated symptoms include arthralgias, fatigue, numbness and weakness (hands/feet). Pertinent negatives include no abdominal pain, chest pain, chills, congestion, coughing, fever, headaches (feet) or joint swelling. The symptoms are aggravated by walking, standing and bending (weather changes, increased physical activity, ). He has tried oral narcotics, position changes, rest, heat, lying down and acetaminophen for the symptoms. The treatment provided moderate relief.      PEG Assessment   What number best describes your pain on average in the past week?4  What number best describes how, during the past week, pain has interfered with your enjoyment of life?5  What number best describes how, during the past week, pain has interfered with your general activity?  5    The following portions of the patient's history were reviewed and updated as appropriate: allergies, current medications, past family history, past medical history, past social history, past surgical history, and problem list.    Review of Systems   Constitutional:  Positive for activity change and fatigue. Negative for chills and fever.   HENT:  Negative for congestion.    Respiratory:  Negative for cough.   "  Cardiovascular:  Negative for chest pain.   Gastrointestinal:  Negative for abdominal pain, constipation and diarrhea.   Genitourinary:  Negative for difficulty urinating and dysuria.   Musculoskeletal:  Positive for arthralgias and back pain. Negative for joint swelling.   Neurological:  Positive for weakness (hands/feet) and numbness. Negative for dizziness, light-headedness and headaches (feet).   Psychiatric/Behavioral:  Negative for sleep disturbance and suicidal ideas. The patient is not nervous/anxious.      I have reviewed and confirmed the accuracy of the ROS as documented by the MA/LPN/RN MY Perez    Vitals:    08/14/23 1144   BP: 130/60   BP Location: Right arm   Patient Position: Sitting   Cuff Size: Adult   Pulse: 63   Resp: 18   Temp: 96.6 øF (35.9 øC)   SpO2: 100%   Weight: 76.4 kg (168 lb 6.4 oz)   Height: 172.7 cm (67.99\")   PainSc:   8   PainLoc: Back     Objective   Physical Exam  Constitutional:       Appearance: Normal appearance.   HENT:      Head: Normocephalic.   Cardiovascular:      Rate and Rhythm: Normal rate and regular rhythm.   Pulmonary:      Effort: Pulmonary effort is normal.      Breath sounds: Normal breath sounds.   Musculoskeletal:      Cervical back: Normal range of motion.      Lumbar back: Tenderness present. Decreased range of motion. Positive right straight leg raise test and positive left straight leg raise test.      Comments: Diffuse arthritic changes   Skin:     General: Skin is warm and dry.      Capillary Refill: Capillary refill takes less than 2 seconds.   Neurological:      General: No focal deficit present.      Mental Status: He is alert and oriented to person, place, and time.   Psychiatric:         Mood and Affect: Mood normal.         Behavior: Behavior normal.         Thought Content: Thought content normal.         Cognition and Memory: Cognition normal.     Assessment & Plan   Diagnoses and all orders for this visit:    1. Chronic bilateral " low back pain without sciatica (Primary)  -     HYDROcodone-acetaminophen (NORCO)  MG per tablet; Take 1 tablet by mouth Every 8 (Eight) Hours As Needed for Severe Pain. 30 day supply  Dispense: 75 tablet; Refill: 0  -     Urine Drug Screen Confirmation - Urine, Clean Catch; Future  -     POC Urine Drug Screen, Triage    2. Chronic pain of both hips  -     HYDROcodone-acetaminophen (NORCO)  MG per tablet; Take 1 tablet by mouth Every 8 (Eight) Hours As Needed for Severe Pain. 30 day supply  Dispense: 75 tablet; Refill: 0  -     Urine Drug Screen Confirmation - Urine, Clean Catch; Future  -     POC Urine Drug Screen, Triage    3. Arthralgia of multiple joints  -     Urine Drug Screen Confirmation - Urine, Clean Catch; Future  -     POC Urine Drug Screen, Triage    4. Lumbar facet arthropathy  -     Urine Drug Screen Confirmation - Urine, Clean Catch; Future  -     POC Urine Drug Screen, Triage    --- Routine UDS in office today as part of monitoring requirements for controlled substances.  The specimen was viewed and the immunoassay result reviewed and is +OPI.  This specimen will be sent to [facility] for confirmation.     --- The patient signed an updated copy of the controlled substance agreement on 6/12/23  --- Continue Hydrocodone. Patient appears stable with current regimen. No adverse effects. Regarding continuation of opioids, there is no evidence of aberrant behavior or any red flags.  The patient continues with appropriate response to opioid therapy. ADL's remain intact by self.   --- Follow-up 2 months or sooner if needed    Madhu Santoro reports a pain score of 8.  Given his pain assessment as noted, treatment options were discussed and the following options were decided upon as a follow-up plan to address the patient's pain: continuation of current treatment plan for pain and prescription for opiod analgesics.       RUTH REPORT  As part of the patient's treatment plan, I am  prescribing controlled substances. The patient has been made aware of appropriate use of such medications, including potential risk of somnolence, limited ability to drive and/or work safely, and the potential for dependence or overdose. It has also been made clear that these medications are for use by this patient only, without concomitant use of alcohol or other substances unless prescribed.     Patient has completed prescribing agreement detailing terms of continued prescribing of controlled substances, including monitoring RUTH reports, urine drug screening, and pill counts if necessary. The patient is aware that inappropriate use will results in cessation of prescribing such medications.    As the clinician, I personally reviewed the RUTH from 8/14/23 while the patient was in the office today.    History and physical exam exhibit continued safe and appropriate use of controlled substances.    Dictated utilizing Dragon dictation.

## 2023-08-24 DIAGNOSIS — D45 POLYCYTHEMIA VERA: Primary | ICD-10-CM

## 2023-08-25 ENCOUNTER — LAB (OUTPATIENT)
Dept: LAB | Facility: HOSPITAL | Age: 78
End: 2023-08-25
Payer: MEDICARE

## 2023-08-25 DIAGNOSIS — D45 POLYCYTHEMIA VERA: ICD-10-CM

## 2023-08-25 LAB
BASOPHILS # BLD AUTO: 0.02 10*3/MM3 (ref 0–0.2)
BASOPHILS NFR BLD AUTO: 0.3 % (ref 0–1.5)
EOSINOPHIL # BLD AUTO: 0.08 10*3/MM3 (ref 0–0.4)
EOSINOPHIL NFR BLD AUTO: 1 % (ref 0.3–6.2)
ERYTHROCYTE [DISTWIDTH] IN BLOOD BY AUTOMATED COUNT: ABNORMAL %
HCT VFR BLD AUTO: 36.1 % (ref 37.5–51)
HGB BLD-MCNC: 12.1 G/DL (ref 13–17.7)
IMM GRANULOCYTES # BLD AUTO: 0.19 10*3/MM3 (ref 0–0.05)
IMM GRANULOCYTES NFR BLD AUTO: 2.4 % (ref 0–0.5)
LYMPHOCYTES # BLD AUTO: 1.58 10*3/MM3 (ref 0.7–3.1)
LYMPHOCYTES NFR BLD AUTO: 20.1 % (ref 19.6–45.3)
MCH RBC QN AUTO: 31 PG (ref 26.6–33)
MCHC RBC AUTO-ENTMCNC: 33.5 G/DL (ref 31.5–35.7)
MCV RBC AUTO: 92.6 FL (ref 79–97)
MONOCYTES # BLD AUTO: 0.72 10*3/MM3 (ref 0.1–0.9)
MONOCYTES NFR BLD AUTO: 9.1 % (ref 5–12)
NEUTROPHILS NFR BLD AUTO: 5.28 10*3/MM3 (ref 1.7–7)
NEUTROPHILS NFR BLD AUTO: 67.1 % (ref 42.7–76)
NRBC BLD AUTO-RTO: 0 /100 WBC (ref 0–0.2)
PLATELET # BLD AUTO: 211 10*3/MM3 (ref 140–450)
PMV BLD AUTO: 9.3 FL (ref 6–12)
RBC # BLD AUTO: 3.9 10*6/MM3 (ref 4.14–5.8)
WBC NRBC COR # BLD: 7.87 10*3/MM3 (ref 3.4–10.8)

## 2023-08-25 PROCEDURE — 36415 COLL VENOUS BLD VENIPUNCTURE: CPT

## 2023-08-25 PROCEDURE — 85025 COMPLETE CBC W/AUTO DIFF WBC: CPT

## 2023-09-11 DIAGNOSIS — G89.29 CHRONIC PAIN OF BOTH HIPS: ICD-10-CM

## 2023-09-11 DIAGNOSIS — G89.29 CHRONIC BILATERAL LOW BACK PAIN WITHOUT SCIATICA: ICD-10-CM

## 2023-09-11 DIAGNOSIS — M54.50 CHRONIC BILATERAL LOW BACK PAIN WITHOUT SCIATICA: ICD-10-CM

## 2023-09-11 DIAGNOSIS — M25.552 CHRONIC PAIN OF BOTH HIPS: ICD-10-CM

## 2023-09-11 DIAGNOSIS — M25.551 CHRONIC PAIN OF BOTH HIPS: ICD-10-CM

## 2023-09-11 RX ORDER — HYDROCODONE BITARTRATE AND ACETAMINOPHEN 10; 325 MG/1; MG/1
1 TABLET ORAL EVERY 8 HOURS PRN
Qty: 75 TABLET | Refills: 0 | Status: SHIPPED | OUTPATIENT
Start: 2023-09-11

## 2023-09-11 NOTE — TELEPHONE ENCOUNTER
Medication Refill Request    Date of phone call:     Medication being requested: Norco  mg  si tab po q 8 hrs prn  Qty: 75    Date of last visit: 23    Date of last refill:     RUTH up to date?:     Next Follow up?: 10/12/23    Any new pertinent information? (i.e, new medication allergies, new use of medications, change in patient's health or condition, non-compliance or inconsistency with prescribing agreement?):

## 2023-09-18 ENCOUNTER — TELEPHONE (OUTPATIENT)
Dept: PAIN MEDICINE | Facility: CLINIC | Age: 78
End: 2023-09-18

## 2023-09-18 NOTE — TELEPHONE ENCOUNTER
Caller: PAPITO RAMIREZ     Relationship: PATIENT     Best call back number: [phone]    Requested Prescriptions:   Requested Prescriptions      No prescriptions requested or ordered in this encounter      HYDROcodone-acetaminophen (NORCO)  MG per tablet   Pharmacy where request should be sent:    [facility]  [address] [place] [address]  [phone]    [facility] DOES NOT HAVE RX     Last office visit with prescribing clinician: 8/14/2023   Last telemedicine visit with prescribing clinician: Visit date not found   Next office visit with prescribing clinician: 10/12/2023     Additional details provided by patient: PATIENT HAS BEEN OUT SINCE 09/14/23    Does the patient have less than a 3 day supply:  [x] Yes  [] No    Would you like a call back once the refill request has been completed: [] Yes [x] No    If the office needs to give you a call back, can they leave a voicemail: [] Yes [x] No    Daniela Barger   09/18/23 16:30 EDT

## 2023-09-19 DIAGNOSIS — M54.50 CHRONIC BILATERAL LOW BACK PAIN WITHOUT SCIATICA: ICD-10-CM

## 2023-09-19 DIAGNOSIS — M25.551 CHRONIC PAIN OF BOTH HIPS: ICD-10-CM

## 2023-09-19 DIAGNOSIS — G89.29 CHRONIC PAIN OF BOTH HIPS: ICD-10-CM

## 2023-09-19 DIAGNOSIS — M25.552 CHRONIC PAIN OF BOTH HIPS: ICD-10-CM

## 2023-09-19 DIAGNOSIS — G89.29 CHRONIC BILATERAL LOW BACK PAIN WITHOUT SCIATICA: ICD-10-CM

## 2023-09-19 RX ORDER — HYDROCODONE BITARTRATE AND ACETAMINOPHEN 10; 325 MG/1; MG/1
1 TABLET ORAL EVERY 8 HOURS PRN
Qty: 75 TABLET | Refills: 0 | Status: SHIPPED | OUTPATIENT
Start: 2023-09-19

## 2023-09-19 NOTE — TELEPHONE ENCOUNTER
Please resend prescription to [facility], [facility] does not have medication available.

## 2023-09-20 NOTE — PROGRESS NOTES
REASON FOR FOLLOW-UP: Polycythemia vera, JAK2 mutation detected    HISTORY OF PRESENT ILLNESS:  Mr. Santoro is a 77 y.o. male with the above-mentioned history, who returns the office today for 2-week follow-up and lab review.  He currently continues on hydroxyurea 500 mg daily.  This was reduced after significant decline of his counts after the initiation of the 1000 mg.  He continues to tolerate Hydrea well.  He denies mouth sores or nausea.  As he is seen 9/21/2023 however he is slightly more anemic than previous and this begs the question as to his current dose of Hydrea further.  He struggles with neuropathy which has been extensively evaluated by multiple specialties and present prior to initiation of hydroxyurea.    Hematologic/oncologic history:     The patient is a 77-year-old male followed with a number of issues including coronary artery disease, status post CABG, atrial flutter, previous CVA hyperlipidemia, GE reflux, carotid vascular disease, and hypertension.     He had been seen by vascular 2/25/2022 with mild progression of carotid disease but otherwise stable and also had follow-up with pain management for back pain 3/28/2022 requiring chronic narcotic therapy.  Patient has been resistant to epidural like procedures.     Unfortunately has had concurrent constipation requiring laxatives and additional opioid antagonist to reduce GI hypomotility.  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     Patient recently reviewed again by pain management with worsening pain.  Patient also saw GI periodically undergoing a follow-up MRI of the abdomen 8/9/2022 with scattered pancreatic cystic lesions unchanged from previous.  He had previously undergone EGD and colonoscopy 11/5/2021 with irregular Z-line and biopsies taken in nonbleeding internal  hemorrhoids found during retroflexion that were small.  There are scattered small and large mouth diverticula found in the sigmoid colon descending colon and splenic flexure.       The patient now presents for thrombocytosis with review of his record over the last year demonstrating a platelet count that is escalated from 3-400,000 now to 8/11/2022 648,000 but concurrent microcytic and hypochromic indices.  These indices have been slowly reducing over the last 2 years approximately followed more closely.        These findings are discussed with the patient 8/16/2022 who indicates that he actually feels about the same though still has issues with back pain.  He is noted no change in bowel habit including, fortunately, improvement of his constipation without the use of additional medication such as Movantik.  He has not noted any change in the color of his stool including dark stools or any blood per rectum, urine though he does note periodic excess bruising from his anticoagulation therapy.       The patient moved to a trial of iron which, unfortunately, worsened his constipation in which he had discontinue. He had further issues in early September with left renal stone, requiring cystoscopy, stent placement 8/23/2022.       He is next seen back 9/28/2022 with repeat studies performed 9/21 demonstrating 5% iron saturation, ferritin of 12.5.  He remains further weight and fatigue, again oral iron intolerant and will require IV iron preparations for his iron deficiency anemia.      The patient is next seen 1/18/2023 with considerable improvement in his testing including H&H now 17.2 and 55.4 though with iron saturation of 8% and ferritin 26.5.  He has not noted any blood loss but remains generally weak and with ongoing periodic abdominal pain.  His major concerns continue to be a disequilibrium since his previous CVA symptoms.  He has not been seen in follow-up by neurology.  He continues to have hematuria by urinary  assessment but not gross findings.  We have discussed that he may actually have a myeloproliferative disorder and requested that he undergo an assessment for JAK2 analysis today.    He is seen back 4/17/2023JAK  2-V617 F mutation having been detected.  In the interval he was admitted 2/19-21/23 for weakness, fall and ER evaluation not demonstrate any acute intracranial process, CT of lumbar spine with lumbar degenerative disease and other studies not show any acute abnormalities.  Fortunately he went on to improve though his wife had a positive COVID test recently on home examination.  His follow-up testing 4/10/2023 include an H&H of 18.3 and 60.9 white count of 14,700 platelet count of 477,000.    He is seen 4/17/2023 and we discussed that he is better served with phlebotomy at this point.  He states he feels so poorly that he is quite willing to try to move to additional therapies including phlebotomy.    Patient initiating Hydrea 1000 mg daily as of 5/19/2023.    He is next seen back in office 6/30/2023.  He is gradually seen an improvement in his hemoglobin hematocrit dropping to a normal CBC approximately 6/30/2023 H&H of 14.1 and 46.6, white count of 4800 and platelet count of 146,000.  He is tolerating treatment well without additional side effects.    Patient seen 9/21/2023 with H&H 11.1 and 32.9, white count 5110, platelet count 151,000.  Patient without additional side effect from Hydrea though dose is now reduced to 500 mg every other day.  Past Medical History:   Diagnosis Date    Anxiety     Arthritis     Atrial flutter     Status post cavotricuspid isthmus ablation by Dr. Gustafson on 4/18/17    Mandel esophagus     Benign essential hypertension     CAD (coronary artery disease)     3 vessel CABG 4/11/17 by Dr. Cortez: ROSARIO-prox LAD, SVG-OM1, SVG-OM3    Carotid artery disease     Status post carotid endarterectomy - [facility] 4/10/17: 50-59% NICHELLE, 1-15% LICA.     Colonic polyp     Cyst of pancreas     DDD  "(degenerative disc disease), lumbosacral     GERD (gastroesophageal reflux disease)     H/O bone density study 2013    H/O complete eye exam 2014    History of kidney stone     HLD (hyperlipidemia)     Hypertension     Kidney stone     8/22/22    Lipid screening 05/31/2013    Low back pain     [facility]    Screening for prostate cancer 07/07/2015    Skin cancer     nose    Stroke     RESIDUAL--\"BALANCE ISSUES\"        Past Surgical History:   Procedure Laterality Date    CARDIAC CATHETERIZATION N/A 04/10/2017    Procedure: Left Heart Cath;  Surgeon: Marjorie Healy MD;  Location: [facility] CATH INVASIVE LOCATION;  Service:     CARDIAC CATHETERIZATION N/A 04/10/2017    Procedure: Coronary angiography;  Surgeon: Marjorie Healy MD;  Location: [facility] CATH INVASIVE LOCATION;  Service:     CARDIAC CATHETERIZATION N/A 04/10/2017    Procedure: Left ventriculography;  Surgeon: Marjorie Healy MD;  Location: [facility] CATH INVASIVE LOCATION;  Service:     CARDIAC CATHETERIZATION  2011    CARDIAC ELECTROPHYSIOLOGY PROCEDURE N/A 04/18/2017    Procedure: Ablation atrial flutter;  Surgeon: Jose Antonio Gustafson MD;  Location: [facility] CATH INVASIVE LOCATION;  Service:     CAROTID ENDARTERECTOMY      CHOLECYSTECTOMY      CHOLECYSTECTOMY WITH INTRAOPERATIVE CHOLANGIOGRAM N/A 09/07/2019    Procedure: Laparoscopic cholecystectomy with intraoperative cholangiogram;  Surgeon: Gauri Travis MD;  Location: [facility] MAIN OR;  Service: General    COLONOSCOPY  01/06/2015    Diverticulosis, one TA    COLONOSCOPY N/A 02/14/2019    tics, NBIH, adenomatous polyp x 2    COLONOSCOPY N/A 11/05/2021    Procedure: COLONOSCOPY TO CECUM AND TERM. ILEUM WITH COLD POLYPECTOMIES;  Surgeon: Everton Abel MD;  Location: [facility] ENDOSCOPY;  Service: Gastroenterology;  Laterality: N/A;  PRE OP - PERS H/O POLYPS  POST OP - COLON POLYPS,, DIVERTICULOSIS, HEMORRHOIDS    CORONARY ARTERY BYPASS GRAFT N/A 04/11/2017    Procedure: AR STERNOTOMY " CORONARY ARTERY BYPASS GRAFT TIMES 3 USING LEFT INTERNAL MAMMARY ARTERY AND LEFT GREATER SAPHENOUS VEIN GRAFT PER ENDOSCOPIC VEIN HARVESTING AND PRP ;  Surgeon: Temo Cortez MD;  Location:  [facility] MAIN OR;  Service:     ENDOSCOPY  01/06/2015    HH, Ervin's esophagus    ENDOSCOPY N/A 02/14/2019    Z line irregular, HH, Ervin's esophagus    ENDOSCOPY N/A 11/05/2021    Procedure: ESOPHAGOGASTRODUODENOSCOPY WITH BIOPSIES;  Surgeon: Everton Abel MD;  Location: [facility] ENDOSCOPY;  Service: Gastroenterology;  Laterality: N/A;  PRE OP - PERS H/O ERVIN'S  POST OP - IRREG Z LINE    KNEE SURGERY Left     URETEROSCOPY LASER LITHOTRIPSY WITH STENT INSERTION Left 8/23/2022    Procedure: Cysto retrograde with left uretro stent placement;  Surgeon: Damien Oliveira MD;  Location: [facility] MAIN OR;  Service: Urology;  Laterality: Left;    VASECTOMY          Current Outpatient Medications on File Prior to Visit   Medication Sig Dispense Refill    allopurinol (Zyloprim) 300 MG tablet Take 1 tablet by mouth Daily. 90 tablet 1    amitriptyline (ELAVIL) 50 MG tablet Take 1 tablet by mouth Every Night. 30 tablet 3    amLODIPine (NORVASC) 2.5 MG tablet Take 1 tablet by mouth Daily. 90 tablet 3    apixaban (ELIQUIS) 5 MG tablet tablet Take 1 tablet by mouth 2 (Two) Times a Day. 90 tablet 3    furosemide (LASIX) 20 MG tablet TAKE ONE TABLET BY MOUTH DAILY 7 tablet 1    HYDROcodone-acetaminophen (NORCO)  MG per tablet Take 1 tablet by mouth Every 8 (Eight) Hours As Needed for Severe Pain. 30 day supply 75 tablet 0    hydroxyurea (HYDREA) 500 MG capsule Take 1 capsule by mouth Daily. 30 capsule 5    mupirocin (BACTROBAN) 2 % cream Apply 1 application topically to the appropriate area as directed 2 (Two) Times a Day. 15 g 0    Myrbetriq 25 MG tablet sustained-release 24 hour 24 hr tablet Take 1 tablet by mouth Daily.      pantoprazole (PROTONIX) 40 MG EC tablet Take 1 tablet by mouth Daily. 90 tablet 1    potassium  chloride 10 MEQ CR tablet TAKE ONE TABLET BY MOUTH DAILY 7 tablet 1    ramipril (ALTACE) 5 MG capsule Take 1 capsule by mouth Daily. 90 capsule 1    rosuvastatin (Crestor) 20 MG tablet Take 1 tablet by mouth Daily. 90 tablet 1     Current Facility-Administered Medications on File Prior to Visit   Medication Dose Route Frequency Provider Last Rate Last Admin    cyanocobalamin injection 1,000 mcg  1,000 mcg Intramuscular Q30 Days Damian Sanchez MD   1,000 mcg at 23 0957        ALLERGIES:    Allergies   Allergen Reactions    Atorvastatin Myalgia     Myalgia    Penicillins Hives, Swelling and Rash     Tolerates cephalosporins         Social History     Socioeconomic History    Marital status:      Spouse name: Brooke    Years of education: 9th grade   Tobacco Use    Smoking status: Former     Packs/day: 1.00     Types: Cigarettes     Start date: 1959     Quit date: 2009     Years since quittin.7    Smokeless tobacco: Never    Tobacco comments:     CAFFEINE USE   Vaping Use    Vaping Use: Never used   Substance and Sexual Activity    Alcohol use: Not Currently     Comment: rarely    Drug use: No    Sexual activity: Defer     Partners: Female        Family History   Problem Relation Age of Onset    Hypertension Mother     Heart disease Mother     Heart attack Mother     Stroke Mother     Heart disease Father     Heart attack Father     Stroke Father     Hypertension Sister     Heart attack Brother     Heart disease Brother     No Known Problems Brother     Heart disease Brother     Heart attack Brother     Diabetes Brother     No Known Problems Maternal Grandmother     No Known Problems Maternal Grandfather     No Known Problems Paternal Grandmother     No Known Problems Paternal Grandfather     Pancreatic cancer Paternal Cousin     Arthritis Other     Diabetes Other     Hypertension Other     Kidney disease Other         stones    Malig Hyperthermia Neg Hx         Review of Systems   As per  HPI    Objective     There were no vitals filed for this visit.        6/30/2023     1:12 PM   Current Status   ECOG score 0     Physical Exam  Vitals reviewed.   Constitutional:       General: He is not in acute distress.     Appearance: Normal appearance. He is well-developed.   HENT:      Head: Normocephalic and atraumatic.   Eyes:      Pupils: Pupils are equal, round, and reactive to light.   Cardiovascular:      Rate and Rhythm: Normal rate and regular rhythm.      Heart sounds: Normal heart sounds. No murmur heard.  Pulmonary:      Effort: Pulmonary effort is normal. No respiratory distress.      Breath sounds: Normal breath sounds.   Abdominal:      Palpations: Abdomen is soft.   Musculoskeletal:         General: Normal range of motion.      Cervical back: Normal range of motion.   Skin:     General: Skin is warm and dry.      Findings: No rash.   Neurological:      Mental Status: He is alert and oriented to person, place, and time.     I have reexamined the patient and the results are consistent with the previously documented exam. Aquiles Lopez MD       RECENT LABS:                      Assessment & Plan      77 y.o. male  with medical issues including coronary artery disease, status post CABG, atrial flutter status post ablation, previous CVA, hyperlipidemia, GE reflux, cardiovascular disease and hypertension.  He also has chronic back pain followed by pain management requiring chronic narcotic therapy.  His medical issues per tickly cardiovascular and CVA led to eventual placement on aspirin and Eliquis chronically.  He has additionally been seen by GI including review of scattered cystic lesions in the pancreas and EGD colonoscopy as recently as November 2021.      *Polycythemia vera  The patient now presents for thrombocytosis with review of his record over the last year demonstrating a platelet count that is escalated from 3-400,000 now to 8/11/2022 648,000 but concurrent microcytic and  hypochromic indices.  These indices have been slowly reducing over the last 2 years approximately followed more closely.  Concurrently is a mild drop in his baseline hemoglobin still well within normal limits.  thrombocytosis thought, initially, to be related to iron deficiency.  Ferritin found to be 16.3 and iron of 26 with 5% saturation and TIBC 511.   The patient was placed on ferrous gluconate which, unfortunately, he was unable to tolerate with worsening constipation.  He had further issues in early September with left renal stone, requiring cystoscopy, stent placement 8/23/2022.  9/28/2022 with repeat studies performed 9/21 demonstrating 5% iron saturation, ferritin of 12.5.  He remains further weight and fatigue, again oral iron intolerant and will require IV iron preparations for his iron deficiency anemia.  We discontinued oral iron and proceeded with Injectafer given 9/28/2022 in 10/5/2022  4/17/2023JAK  2-V617 F mutation having been detected.    In the interval he was admitted 2/19-21/23 for weakness, fall and ER evaluation not demonstrate any acute intracranial process, CT of lumbar spine with lumbar degenerative disease and other studies not show any acute abnormalities.  Fortunately he went on to improve though his wife had a positive COVID test recently on home examination.  4/10/2023 include an H&H of 18.3 and 60.9 white count of 14,700 platelet count of 477,000.  5/15/2023 hemoglobin 15.3, hematocrit 50.4.  Reviewed with Dr. Lopez plans to hold off on phlebotomy and initiate Hydrea 1000 mg daily.  We will tentatively schedule him for return follow-up visit in 2 weeks with repeat labs and reassessment.  6/1/2020 2:23 weeks of Hydrea 1000 mg daily, WBC 5.0, hemoglobin 15.2, hematocrit 49.8, platelets 113,000.  Hydrea was reduced to 500 mg daily  Stability of counts today, 6/15/2023 on 500 mg daily with WBC 4.81, hemoglobin 14.6, hematocrit 46.7%, platelets 156,000  Patient seen 6/30/2023 with H&H of  14.1 and 46.6 white count of 4800 and platelet count of 1 46,000.  Patient subsequently assessed including 9/21/2023 with H&H gradually dropping 11.1 and 32.9, white count of 5110, platelet count 151,000, MCV not 110.8.    PLAN:  Continue Hydrea 500 mg but starting at the beginning of next week moved to every other day  Monthly assessment-NP next month.  Patient agreeable this plan and follow-up.  This patient is on high risk drug therapy requiring intensive monitoring for toxicity.        Aquiles Lopez MD  09/21/2023

## 2023-09-21 ENCOUNTER — OFFICE VISIT (OUTPATIENT)
Dept: ONCOLOGY | Facility: CLINIC | Age: 78
End: 2023-09-21
Payer: MEDICARE

## 2023-09-21 ENCOUNTER — LAB (OUTPATIENT)
Dept: LAB | Facility: HOSPITAL | Age: 78
End: 2023-09-21

## 2023-09-21 VITALS
SYSTOLIC BLOOD PRESSURE: 128 MMHG | BODY MASS INDEX: 25.94 KG/M2 | WEIGHT: 171.2 LBS | RESPIRATION RATE: 16 BRPM | TEMPERATURE: 96.7 F | DIASTOLIC BLOOD PRESSURE: 72 MMHG | HEIGHT: 68 IN | HEART RATE: 65 BPM | OXYGEN SATURATION: 99 %

## 2023-09-21 DIAGNOSIS — D45 POLYCYTHEMIA VERA: Primary | ICD-10-CM

## 2023-09-21 DIAGNOSIS — D45 POLYCYTHEMIA VERA: ICD-10-CM

## 2023-09-21 LAB
BASOPHILS # BLD AUTO: 0.01 10*3/MM3 (ref 0–0.2)
BASOPHILS NFR BLD AUTO: 0.2 % (ref 0–1.5)
EOSINOPHIL # BLD AUTO: 0.04 10*3/MM3 (ref 0–0.4)
EOSINOPHIL NFR BLD AUTO: 0.8 % (ref 0.3–6.2)
ERYTHROCYTE [DISTWIDTH] IN BLOOD BY AUTOMATED COUNT: ABNORMAL %
HCT VFR BLD AUTO: 32.9 % (ref 37.5–51)
HGB BLD-MCNC: 11.1 G/DL (ref 13–17.7)
IMM GRANULOCYTES # BLD AUTO: 0.07 10*3/MM3 (ref 0–0.05)
IMM GRANULOCYTES NFR BLD AUTO: 1.4 % (ref 0–0.5)
LYMPHOCYTES # BLD AUTO: 1.09 10*3/MM3 (ref 0.7–3.1)
LYMPHOCYTES NFR BLD AUTO: 21.3 % (ref 19.6–45.3)
MCH RBC QN AUTO: 37.4 PG (ref 26.6–33)
MCHC RBC AUTO-ENTMCNC: 33.7 G/DL (ref 31.5–35.7)
MCV RBC AUTO: 110.8 FL (ref 79–97)
MONOCYTES # BLD AUTO: 0.54 10*3/MM3 (ref 0.1–0.9)
MONOCYTES NFR BLD AUTO: 10.6 % (ref 5–12)
NEUTROPHILS NFR BLD AUTO: 3.36 10*3/MM3 (ref 1.7–7)
NEUTROPHILS NFR BLD AUTO: 65.7 % (ref 42.7–76)
NRBC BLD AUTO-RTO: 0 /100 WBC (ref 0–0.2)
PLATELET # BLD AUTO: 151 10*3/MM3 (ref 140–450)
PMV BLD AUTO: 9.2 FL (ref 6–12)
RBC # BLD AUTO: 2.97 10*6/MM3 (ref 4.14–5.8)
WBC NRBC COR # BLD: 5.11 10*3/MM3 (ref 3.4–10.8)

## 2023-09-21 PROCEDURE — 85025 COMPLETE CBC W/AUTO DIFF WBC: CPT

## 2023-09-21 PROCEDURE — 99213 OFFICE O/P EST LOW 20 MIN: CPT | Performed by: INTERNAL MEDICINE

## 2023-09-21 PROCEDURE — 3078F DIAST BP <80 MM HG: CPT | Performed by: INTERNAL MEDICINE

## 2023-09-21 PROCEDURE — 3074F SYST BP LT 130 MM HG: CPT | Performed by: INTERNAL MEDICINE

## 2023-09-21 PROCEDURE — 36415 COLL VENOUS BLD VENIPUNCTURE: CPT

## 2023-09-21 PROCEDURE — 1126F AMNT PAIN NOTED NONE PRSNT: CPT | Performed by: INTERNAL MEDICINE

## 2023-09-21 NOTE — LETTER
September 21, 2023     Damian Sanchez MD  [id number] [facility]  [address]  [place] [id number]    Patient: Madhu Santoro   YOB: 1945   Date of Visit: 9/21/2023     Dear Damian Sanchez MD:       Thank you for referring Madhu Santoro to me for evaluation. Below are the relevant portions of my assessment and plan of care.    If you have questions, please do not hesitate to call me. I look forward to following Madhu along with you.         Sincerely,        Aquiles Lopez MD        CC: No Recipients    Aquiles Lopez MD  09/21/23 2048  Sign when Signing Visit        REASON FOR FOLLOW-UP: Polycythemia vera, JAK2 mutation detected    HISTORY OF PRESENT ILLNESS:  Mr. Santoro is a 77 y.o. male with the above-mentioned history, who returns the office today for 2-week follow-up and lab review.  He currently continues on hydroxyurea 500 mg daily.  This was reduced after significant decline of his counts after the initiation of the 1000 mg.  He continues to tolerate Hydrea well.  He denies mouth sores or nausea.  As he is seen 9/21/2023 however he is slightly more anemic than previous and this begs the question as to his current dose of Hydrea further.  He struggles with neuropathy which has been extensively evaluated by multiple specialties and present prior to initiation of hydroxyurea.    Hematologic/oncologic history:     The patient is a 77-year-old male followed with a number of issues including coronary artery disease, status post CABG, atrial flutter, previous CVA hyperlipidemia, GE reflux, carotid vascular disease, and hypertension.     He had been seen by vascular 2/25/2022 with mild progression of carotid disease but otherwise stable and also had follow-up with pain management for back pain 3/28/2022 requiring chronic narcotic therapy.  Patient has been resistant to epidural like procedures.     Unfortunately has had concurrent constipation requiring laxatives and additional opioid  antagonist to reduce GI hypomotility.  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     Patient recently reviewed again by pain management with worsening pain.  Patient also saw GI periodically undergoing a follow-up MRI of the abdomen 8/9/2022 with scattered pancreatic cystic lesions unchanged from previous.  He had previously undergone EGD and colonoscopy 11/5/2021 with irregular Z-line and biopsies taken in nonbleeding internal hemorrhoids found during retroflexion that were small.  There are scattered small and large mouth diverticula found in the sigmoid colon descending colon and splenic flexure.       The patient now presents for thrombocytosis with review of his record over the last year demonstrating a platelet count that is escalated from 3-400,000 now to 8/11/2022 648,000 but concurrent microcytic and hypochromic indices.  These indices have been slowly reducing over the last 2 years approximately followed more closely.        These findings are discussed with the patient 8/16/2022 who indicates that he actually feels about the same though still has issues with back pain.  He is noted no change in bowel habit including, fortunately, improvement of his constipation without the use of additional medication such as Movantik.  He has not noted any change in the color of his stool including dark stools or any blood per rectum, urine though he does note periodic excess bruising from his anticoagulation therapy.       The patient moved to a trial of iron which, unfortunately, worsened his constipation in which he had discontinue. He had further issues in early September with left renal stone, requiring cystoscopy, stent placement 8/23/2022.       He is next seen back 9/28/2022 with repeat studies performed 9/21 demonstrating 5% iron saturation, ferritin of  12.5.  He remains further weight and fatigue, again oral iron intolerant and will require IV iron preparations for his iron deficiency anemia.      The patient is next seen 1/18/2023 with considerable improvement in his testing including H&H now 17.2 and 55.4 though with iron saturation of 8% and ferritin 26.5.  He has not noted any blood loss but remains generally weak and with ongoing periodic abdominal pain.  His major concerns continue to be a disequilibrium since his previous CVA symptoms.  He has not been seen in follow-up by neurology.  He continues to have hematuria by urinary assessment but not gross findings.  We have discussed that he may actually have a myeloproliferative disorder and requested that he undergo an assessment for JAK2 analysis today.    He is seen back 4/17/2023JAK  2-V617 F mutation having been detected.  In the interval he was admitted 2/19-21/23 for weakness, fall and ER evaluation not demonstrate any acute intracranial process, CT of lumbar spine with lumbar degenerative disease and other studies not show any acute abnormalities.  Fortunately he went on to improve though his wife had a positive COVID test recently on home examination.  His follow-up testing 4/10/2023 include an H&H of 18.3 and 60.9 white count of 14,700 platelet count of 477,000.    He is seen 4/17/2023 and we discussed that he is better served with phlebotomy at this point.  He states he feels so poorly that he is quite willing to try to move to additional therapies including phlebotomy.    Patient initiating Hydrea 1000 mg daily as of 5/19/2023.    He is next seen back in office 6/30/2023.  He is gradually seen an improvement in his hemoglobin hematocrit dropping to a normal CBC approximately 6/30/2023 H&H of 14.1 and 46.6, white count of 4800 and platelet count of 146,000.  He is tolerating treatment well without additional side effects.    Patient seen 9/21/2023 with H&H 11.1 and 32.9, white count 5110, platelet  "count 151,000.  Patient without additional side effect from Hydrea though dose is now reduced to 500 mg every other day.  Past Medical History:   Diagnosis Date   • Anxiety    • Arthritis    • Atrial flutter     Status post cavotricuspid isthmus ablation by Dr. Gustafson on 4/18/17   • Mandel esophagus    • Benign essential hypertension    • CAD (coronary artery disease)     3 vessel CABG 4/11/17 by Dr. Cortez: ROSARIO-prox LAD, SVG-OM1, SVG-OM3   • Carotid artery disease     Status post carotid endarterectomy - USG 4/10/17: 50-59% NICHELLE, 1-15% LICA.    • Colonic polyp    • Cyst of pancreas    • DDD (degenerative disc disease), lumbosacral    • GERD (gastroesophageal reflux disease)    • H/O bone density study 2013   • H/O complete eye exam 2014   • History of kidney stone    • HLD (hyperlipidemia)    • Hypertension    • Kidney stone     8/22/22   • Lipid screening 05/31/2013   • Low back pain     [facility]   • Screening for prostate cancer 07/07/2015   • Skin cancer     nose   • Stroke     RESIDUAL--\"BALANCE ISSUES\"        Past Surgical History:   Procedure Laterality Date   • CARDIAC CATHETERIZATION N/A 04/10/2017    Procedure: Left Heart Cath;  Surgeon: Marjorie Healy MD;  Location: [facility] CATH INVASIVE LOCATION;  Service:    • CARDIAC CATHETERIZATION N/A 04/10/2017    Procedure: Coronary angiography;  Surgeon: Marjorie Healy MD;  Location: [facility] CATH INVASIVE LOCATION;  Service:    • CARDIAC CATHETERIZATION N/A 04/10/2017    Procedure: Left ventriculography;  Surgeon: Marjorie Healy MD;  [facility];  Service:    • CARDIAC CATHETERIZATION  2011   • CARDIAC ELECTROPHYSIOLOGY PROCEDURE N/A 04/18/2017    Procedure: Ablation atrial flutter;  Surgeon: Jose Antonio Gustafson MD;  [facility];  Service:    • CAROTID ENDARTERECTOMY     • CHOLECYSTECTOMY     • CHOLECYSTECTOMY WITH INTRAOPERATIVE CHOLANGIOGRAM N/A 09/07/2019    Procedure: " Laparoscopic cholecystectomy with intraoperative cholangiogram;  Surgeon: Gauri Travis MD;  Location: [facility] MAIN OR;  Service: General   • COLONOSCOPY  01/06/2015    Diverticulosis, one TA   • COLONOSCOPY N/A 02/14/2019    tics, NBIH, adenomatous polyp x 2   • COLONOSCOPY N/A 11/05/2021    Procedure: COLONOSCOPY TO CECUM AND TERM. ILEUM WITH COLD POLYPECTOMIES;  Surgeon: Everton Abel MD;  [facility];  Service: Gastroenterology;  Laterality: N/A;  PRE OP - PERS H/O POLYPS  POST OP - COLON POLYPS,, DIVERTICULOSIS, HEMORRHOIDS   • CORONARY ARTERY BYPASS GRAFT N/A 04/11/2017    Procedure: AR STERNOTOMY CORONARY ARTERY BYPASS GRAFT TIMES 3 USING LEFT INTERNAL MAMMARY ARTERY AND LEFT GREATER SAPHENOUS VEIN GRAFT PER ENDOSCOPIC VEIN HARVESTING AND PRP ;  Surgeon: Temo Cortez MD;  Location: [facility] MAIN OR;  Service:    • ENDOSCOPY  01/06/2015    HH, Ervin's esophagus   • ENDOSCOPY N/A 02/14/2019    Z line irregular, HH, Ervin's esophagus   • ENDOSCOPY N/A 11/05/2021    Procedure: ESOPHAGOGASTRODUODENOSCOPY WITH BIOPSIES;  Surgeon: Everton Abel MD;  Location: [facility] ENDOSCOPY;  Service: Gastroenterology;  Laterality: N/A;  PRE OP - PERS H/O ERVIN'S  POST OP - IRREG Z LINE   • KNEE SURGERY Left    • URETEROSCOPY LASER LITHOTRIPSY WITH STENT INSERTION Left 8/23/2022    Procedure: Cysto retrograde with left uretro stent placement;  Surgeon: Damien Oliveira MD;  Location: [facility] MAIN OR;  Service: Urology;  Laterality: Left;   • VASECTOMY          Current Outpatient Medications on File Prior to Visit   Medication Sig Dispense Refill   • allopurinol (Zyloprim) 300 MG tablet Take 1 tablet by mouth Daily. 90 tablet 1   • amitriptyline (ELAVIL) 50 MG tablet Take 1 tablet by mouth Every Night. 30 tablet 3   • amLODIPine (NORVASC) 2.5 MG tablet Take 1 tablet by mouth Daily. 90 tablet 3   • apixaban (ELIQUIS) 5 MG tablet tablet Take 1 tablet by mouth 2 (Two) Times a Day. 90 tablet 3    • furosemide (LASIX) 20 MG tablet TAKE ONE TABLET BY MOUTH DAILY 7 tablet 1   • HYDROcodone-acetaminophen (NORCO)  MG per tablet Take 1 tablet by mouth Every 8 (Eight) Hours As Needed for Severe Pain. 30 day supply 75 tablet 0   • hydroxyurea (HYDREA) 500 MG capsule Take 1 capsule by mouth Daily. 30 capsule 5   • mupirocin (BACTROBAN) 2 % cream Apply 1 application topically to the appropriate area as directed 2 (Two) Times a Day. 15 g 0   • Myrbetriq 25 MG tablet sustained-release 24 hour 24 hr tablet Take 1 tablet by mouth Daily.     • pantoprazole (PROTONIX) 40 MG EC tablet Take 1 tablet by mouth Daily. 90 tablet 1   • potassium chloride 10 MEQ CR tablet TAKE ONE TABLET BY MOUTH DAILY 7 tablet 1   • ramipril (ALTACE) 5 MG capsule Take 1 capsule by mouth Daily. 90 capsule 1   • rosuvastatin (Crestor) 20 MG tablet Take 1 tablet by mouth Daily. 90 tablet 1     Current Facility-Administered Medications on File Prior to Visit   Medication Dose Route Frequency Provider Last Rate Last Admin   • cyanocobalamin injection 1,000 mcg  1,000 mcg Intramuscular Q30 Days Damian Sanchez MD   1,000 mcg at 23 0957        ALLERGIES:    Allergies   Allergen Reactions   • Atorvastatin Myalgia     Myalgia   • Penicillins Hives, Swelling and Rash     Tolerates cephalosporins         Social History     Socioeconomic History   • Marital status:      Spouse name: Brooke   • Years of education: 9th grade   Tobacco Use   • Smoking status: Former     Packs/day: 1.00     Types: Cigarettes     Start date: 1959     Quit date: 2009     Years since quittin.7   • Smokeless tobacco: Never   • Tobacco comments:     CAFFEINE USE   Vaping Use   • Vaping Use: Never used   Substance and Sexual Activity   • Alcohol use: Not Currently     Comment: rarely   • Drug use: No   • Sexual activity: Defer     Partners: Female        Family History   Problem Relation Age of Onset   • Hypertension Mother    • Heart disease Mother     • Heart attack Mother    • Stroke Mother    • Heart disease Father    • Heart attack Father    • Stroke Father    • Hypertension Sister    • Heart attack Brother    • Heart disease Brother    • No Known Problems Brother    • Heart disease Brother    • Heart attack Brother    • Diabetes Brother    • No Known Problems Maternal Grandmother    • No Known Problems Maternal Grandfather    • No Known Problems Paternal Grandmother    • No Known Problems Paternal Grandfather    • Pancreatic cancer Paternal Cousin    • Arthritis Other    • Diabetes Other    • Hypertension Other    • Kidney disease Other         stones   • Malig Hyperthermia Neg Hx         Review of Systems   As per HPI    Objective     There were no vitals filed for this visit.        6/30/2023     1:12 PM   Current Status   ECOG score 0     Physical Exam  Vitals reviewed.   Constitutional:       General: He is not in acute distress.     Appearance: Normal appearance. He is well-developed.   HENT:      Head: Normocephalic and atraumatic.   Eyes:      Pupils: Pupils are equal, round, and reactive to light.   Cardiovascular:      Rate and Rhythm: Normal rate and regular rhythm.      Heart sounds: Normal heart sounds. No murmur heard.  Pulmonary:      Effort: Pulmonary effort is normal. No respiratory distress.      Breath sounds: Normal breath sounds.   Abdominal:      Palpations: Abdomen is soft.   Musculoskeletal:         General: Normal range of motion.      Cervical back: Normal range of motion.   Skin:     General: Skin is warm and dry.      Findings: No rash.   Neurological:      Mental Status: He is alert and oriented to person, place, and time.     I have reexamined the patient and the results are consistent with the previously documented exam. Aquiles Lopez MD       RECENT LABS:                      Assessment & Plan      77 y.o. male  with medical issues including coronary artery disease, status post CABG, atrial flutter status post ablation,  previous CVA, hyperlipidemia, GE reflux, cardiovascular disease and hypertension.  He also has chronic back pain followed by pain management requiring chronic narcotic therapy.  His medical issues per tickly cardiovascular and CVA led to eventual placement on aspirin and Eliquis chronically.  He has additionally been seen by GI including review of scattered cystic lesions in the pancreas and EGD colonoscopy as recently as November 2021.      *Polycythemia vera  The patient now presents for thrombocytosis with review of his record over the last year demonstrating a platelet count that is escalated from 3-400,000 now to 8/11/2022 648,000 but concurrent microcytic and hypochromic indices.  These indices have been slowly reducing over the last 2 years approximately followed more closely.  Concurrently is a mild drop in his baseline hemoglobin still well within normal limits.  thrombocytosis thought, initially, to be related to iron deficiency.  Ferritin found to be 16.3 and iron of 26 with 5% saturation and TIBC 511.   The patient was placed on ferrous gluconate which, unfortunately, he was unable to tolerate with worsening constipation.  He had further issues in early September with left renal stone, requiring cystoscopy, stent placement 8/23/2022.  9/28/2022 with repeat studies performed 9/21 demonstrating 5% iron saturation, ferritin of 12.5.  He remains further weight and fatigue, again oral iron intolerant and will require IV iron preparations for his iron deficiency anemia.  We discontinued oral iron and proceeded with Injectafer given 9/28/2022 in 10/5/2022  4/17/2023JAK  2-V617 F mutation having been detected.    In the interval he was admitted 2/19-21/23 for weakness, fall and ER evaluation not demonstrate any acute intracranial process, CT of lumbar spine with lumbar degenerative disease and other studies not show any acute abnormalities.  Fortunately he went on to improve though his wife had a positive COVID  test recently on home examination.  4/10/2023 include an H&H of 18.3 and 60.9 white count of 14,700 platelet count of 477,000.  5/15/2023 hemoglobin 15.3, hematocrit 50.4.  Reviewed with Dr. Lopez plans to hold off on phlebotomy and initiate Hydrea 1000 mg daily.  We will tentatively schedule him for return follow-up visit in 2 weeks with repeat labs and reassessment.  6/1/2020 2:23 weeks of Hydrea 1000 mg daily, WBC 5.0, hemoglobin 15.2, hematocrit 49.8, platelets 113,000.  Hydrea was reduced to 500 mg daily  Stability of counts today, 6/15/2023 on 500 mg daily with WBC 4.81, hemoglobin 14.6, hematocrit 46.7%, platelets 156,000  Patient seen 6/30/2023 with H&H of 14.1 and 46.6 white count of 4800 and platelet count of 1 46,000.  Patient subsequently assessed including 9/21/2023 with H&H gradually dropping 11.1 and 32.9, white count of 5110, platelet count 151,000, MCV not 110.8.    PLAN:  Continue Hydrea 500 mg but starting at the beginning of next week moved to every other day  Monthly assessment-NP next month.  Patient agreeable this plan and follow-up.  This patient is on high risk drug therapy requiring intensive monitoring for toxicity.        Aquiles Lopez MD  09/21/2023

## 2023-09-21 NOTE — SIGNIFICANT NOTE
Detail Level: Detailed Neuro wnl; cardio: wnlx: atrial flutter  Rate 130. resp wnl;    Depth Of Biopsy: dermis Was A Bandage Applied: Yes Size Of Lesion In Cm: 0 Biopsy Type: H and E Biopsy Method: Personna blade Anesthesia Type: 1% lidocaine with epinephrine Anesthesia Volume In Cc (Will Not Render If 0): 0.5 Hemostasis: Drysol Wound Care: Petrolatum Dressing: bandage Destruction After The Procedure: No Type Of Destruction Used: Curettage Curettage Text: The wound bed was treated with curettage after the biopsy was performed. Cryotherapy Text: The wound bed was treated with cryotherapy after the biopsy was performed. Electrodesiccation Text: The wound bed was treated with electrodesiccation after the biopsy was performed. Electrodesiccation And Curettage Text: The wound bed was treated with electrodesiccation and curettage after the biopsy was performed. Silver Nitrate Text: The wound bed was treated with silver nitrate after the biopsy was performed. Lab: 6 Lab Facility: 3 Consent: Verbal consent was obtained after risks were reviewed including but not limited to scarring, infection, bleeding, pain scabbing, incomplete removal, nerve damage, allergy/reaction to anesthesia/cleanser/other, and possible need for further treatment including but not limited to additional/repeat biopsy, definitive treatment, and/or other procedure. Post-Care Instructions: I reviewed with the patient in detail post-care instructions. Patient is to keep the biopsy site dry overnight, and then apply bacitracin twice daily until healed. Patient may apply hydrogen peroxide soaks to remove any crusting. Notification Instructions: Patient will be notified of biopsy results. However, patient instructed to call the office if not contacted within 2 weeks. Billing Type: Third-Party Bill Information: Selecting Yes will display possible errors in your note based on the variables you have selected. This validation is only offered as a suggestion for you. PLEASE NOTE THAT THE VALIDATION TEXT WILL BE REMOVED WHEN YOU FINALIZE YOUR NOTE. IF YOU WANT TO FAX A PRELIMINARY NOTE YOU WILL NEED TO TOGGLE THIS TO 'NO' IF YOU DO NOT WANT IT IN YOUR FAXED NOTE.

## 2023-09-22 ENCOUNTER — SPECIALTY PHARMACY (OUTPATIENT)
Dept: PHARMACY | Facility: HOSPITAL | Age: 78
End: 2023-09-22
Payer: MEDICARE

## 2023-09-22 DIAGNOSIS — D75.839 THROMBOCYTOSIS: ICD-10-CM

## 2023-09-22 RX ORDER — HYDROXYUREA 500 MG/1
500 CAPSULE ORAL EVERY OTHER DAY
Qty: 15 CAPSULE | Refills: 5 | Status: SHIPPED | OUTPATIENT
Start: 2023-09-22

## 2023-09-22 NOTE — PROGRESS NOTES
Drug: Hydrea (hydroxyurea)  Strength: 500 mg  Directions: Take 1 capsule by mouth  every other day  Quantity: 15  Refills: 5  [facility]  Completed independent double check on medication order/RX.  Noy Cortés RPh, BCOP

## 2023-09-22 NOTE — PROGRESS NOTES
Re: Refills of Oral Specialty Medication - Hydrea (hydroxyurea)    Drug-Drug Interactions: The current medication list was reviewed and there are no relevant drug-drug interactions with the specialty medication.  Medication Allergies: The patient has no relevant allergies as it relates to their oral specialty medication  Review of Labs/Dose Adjustments: DOSE CHANGE - I reviewed the most recent note and labs. Due to side effects the dose is being decreased. I sent refills as described below.    Drug: Hydrea (hydroxyurea)  Strength: 500 mg  Directions: Take 1 capsule by mouth  every other day  Quantity: 15  Refills: 5  Pharmacy prescription sent to: [facility]    Name/Credentials: Jerad Vicente, Janie, JELLY    9/22/2023  12:06 EDT

## 2023-09-28 DIAGNOSIS — G60.9 PERIPHERAL NEUROPATHY, IDIOPATHIC: ICD-10-CM

## 2023-09-28 RX ORDER — AMITRIPTYLINE HYDROCHLORIDE 50 MG/1
TABLET, FILM COATED ORAL
Qty: 30 TABLET | Refills: 3 | Status: SHIPPED | OUTPATIENT
Start: 2023-09-28

## 2023-10-03 NOTE — PROGRESS NOTES
Subjective   Madhu Santoro is a 77 y.o. male.     CC: Cough    History of Present Illness     Patient comes in today reporting a persistent cough         The following portions of the patient's history were reviewed and updated as appropriate: allergies, current medications, past family history, past medical history, past social history, past surgical history, and problem list.    Review of Systems   Constitutional:  Negative for activity change, chills and fever.   Respiratory:  Positive for cough.    Cardiovascular:  Negative for chest pain.   Psychiatric/Behavioral:  Negative for dysphoric mood.      There were no vitals taken for this visit.    Objective   Physical Exam  Constitutional:       General: He is not in acute distress.     Appearance: He is well-developed.   Cardiovascular:      Rate and Rhythm: Normal rate and regular rhythm.   Pulmonary:      Effort: Pulmonary effort is normal.      Breath sounds: Normal breath sounds.   Neurological:      Mental Status: He is alert and oriented to person, place, and time.   Psychiatric:         Behavior: Behavior normal.         Thought Content: Thought content normal.       Assessment & Plan   There are no diagnoses linked to this encounter.

## 2023-10-04 ENCOUNTER — OFFICE VISIT (OUTPATIENT)
Dept: FAMILY MEDICINE CLINIC | Facility: CLINIC | Age: 78
End: 2023-10-04
Payer: MEDICARE

## 2023-10-04 VITALS
TEMPERATURE: 97.4 F | RESPIRATION RATE: 16 BRPM | BODY MASS INDEX: 25.76 KG/M2 | HEART RATE: 76 BPM | OXYGEN SATURATION: 98 % | WEIGHT: 170 LBS | HEIGHT: 68 IN | DIASTOLIC BLOOD PRESSURE: 75 MMHG | SYSTOLIC BLOOD PRESSURE: 148 MMHG

## 2023-10-04 DIAGNOSIS — K21.9 GASTROESOPHAGEAL REFLUX DISEASE WITHOUT ESOPHAGITIS: ICD-10-CM

## 2023-10-04 DIAGNOSIS — R05.8 ACE-INHIBITOR COUGH: ICD-10-CM

## 2023-10-04 DIAGNOSIS — Z00.00 ENCOUNTER FOR SUBSEQUENT ANNUAL WELLNESS VISIT (AWV) IN MEDICARE PATIENT: Primary | ICD-10-CM

## 2023-10-04 DIAGNOSIS — I10 BENIGN ESSENTIAL HYPERTENSION: ICD-10-CM

## 2023-10-04 DIAGNOSIS — T46.4X5A ACE-INHIBITOR COUGH: ICD-10-CM

## 2023-10-04 RX ORDER — RABEPRAZOLE SODIUM 20 MG/1
20 TABLET, DELAYED RELEASE ORAL DAILY
Qty: 90 TABLET | Refills: 1 | Status: SHIPPED | OUTPATIENT
Start: 2023-10-04

## 2023-10-04 RX ORDER — LOSARTAN POTASSIUM 50 MG/1
50 TABLET ORAL DAILY
Qty: 90 TABLET | Refills: 1 | Status: SHIPPED | OUTPATIENT
Start: 2023-10-04

## 2023-10-04 NOTE — PATIENT INSTRUCTIONS
Medicare Wellness  Personal Prevention Plan of Service     Date of Office Visit:    Encounter Provider:  Damian Sanchez MD  Place of Service:  [facility]  Patient Name: Madhu Santoro  :  1945    As part of the Medicare Wellness portion of your visit today, we are providing you with this personalized preventive plan of services (PPPS). This plan is based upon recommendations of the [place] Preventive Services Task Force (USPSTF) and the Advisory Committee on Immunization Practices (ACIP).    This lists the preventive care services that should be considered, and provides dates of when you are due. Items listed as completed are up-to-date and do not require any further intervention.    Health Maintenance   Topic Date Due    BMI FOLLOWUP  Never done    HEPATITIS C SCREENING  Never done    Pneumococcal Vaccine 65+ (2 - PPSV23 or PCV20) 2016    INFLUENZA VACCINE  2023    COVID-19 Vaccine (5 - -24 season) 2023    ZOSTER VACCINE (2 of 2) 2024 (Originally 2017)    LIPID PANEL  2024    ANNUAL WELLNESS VISIT  10/04/2024    GASTROSCOPY (EGD)  2024    COLORECTAL CANCER SCREENING  2024    TDAP/TD VACCINES  Discontinued       No orders of the defined types were placed in this encounter.      Return in about 3 months (around 2024) for Recheck.

## 2023-10-10 ENCOUNTER — SPECIALTY PHARMACY (OUTPATIENT)
Dept: PHARMACY | Facility: HOSPITAL | Age: 78
End: 2023-10-10
Payer: MEDICARE

## 2023-10-12 ENCOUNTER — OFFICE VISIT (OUTPATIENT)
Dept: PAIN MEDICINE | Facility: CLINIC | Age: 78
End: 2023-10-12
Payer: MEDICARE

## 2023-10-12 VITALS
HEIGHT: 68 IN | DIASTOLIC BLOOD PRESSURE: 60 MMHG | BODY MASS INDEX: 26.49 KG/M2 | RESPIRATION RATE: 12 BRPM | HEART RATE: 52 BPM | WEIGHT: 174.8 LBS | TEMPERATURE: 98 F | SYSTOLIC BLOOD PRESSURE: 162 MMHG | OXYGEN SATURATION: 95 %

## 2023-10-12 DIAGNOSIS — Z79.899 ENCOUNTER FOR LONG-TERM (CURRENT) USE OF HIGH-RISK MEDICATION: ICD-10-CM

## 2023-10-12 DIAGNOSIS — M25.50 ARTHRALGIA OF MULTIPLE JOINTS: ICD-10-CM

## 2023-10-12 DIAGNOSIS — M47.816 LUMBAR FACET ARTHROPATHY: ICD-10-CM

## 2023-10-12 DIAGNOSIS — M25.552 CHRONIC PAIN OF BOTH HIPS: ICD-10-CM

## 2023-10-12 DIAGNOSIS — M25.551 CHRONIC PAIN OF BOTH HIPS: ICD-10-CM

## 2023-10-12 DIAGNOSIS — M54.50 CHRONIC BILATERAL LOW BACK PAIN WITHOUT SCIATICA: Primary | ICD-10-CM

## 2023-10-12 DIAGNOSIS — G89.29 CHRONIC BILATERAL LOW BACK PAIN WITHOUT SCIATICA: Primary | ICD-10-CM

## 2023-10-12 DIAGNOSIS — G89.29 CHRONIC PAIN OF BOTH HIPS: ICD-10-CM

## 2023-10-12 DIAGNOSIS — G62.9 NEUROPATHY: ICD-10-CM

## 2023-10-12 RX ORDER — HYDROCODONE BITARTRATE AND ACETAMINOPHEN 10; 325 MG/1; MG/1
1 TABLET ORAL EVERY 8 HOURS PRN
Qty: 75 TABLET | Refills: 0 | Status: SHIPPED | OUTPATIENT
Start: 2023-10-12

## 2023-10-12 NOTE — PROGRESS NOTES
"CHIEF COMPLAINT  Back and joint pain    Subjective   Mdahu Santoro is a 77 y.o. male  who presents for follow-up.  He has a history of chronic low back and joint pain. He reports that his pain has slightly worsened since his last office visit.     Today pain is 8/10VAS in severity. Pain is located in his lumbar spine and runs across his belt line. He continues to report pain in bilateral hands and numbness to bilateral feet. Describes this pain as a nearly continuous aching and throbbing. Pain is worsened by prolonged walking or standing, increased physical activity, and bending/twisitng. Pain improves with rest/reposition, use of a heating pad, and medication. He reports that he has tried PT in the past but this only caused increased pain.     Continues with Hydrocodone 10mg 2-3/day and OTC Tylenol PRN. Reports occasional constipation that is managed by Miralax and laxatives. Denies any other side effects from the regimen including somnolence. The regimen helps \"take the edge off\". Notes improvement in activity and function with regimen. ADL's by self. Denies any bowel or bladder changes.    Not a candidate for NSAIDs - history of TIA's and remains on Eliquis     Continues to have no interest in interventional procedures due to a friend having developed foot drop following a procedure for back pain.      EMG/nerve conduction study ordered by Dr. Russell on 4/27/23 showed moderate to severe peripheral neuropathy. He takes Elavil 50mg at night for neuropathy pain. He also takes Allopurinol for gout (prescribed by Dr. Sanchez).      Unable to tolerate Pregabalin - caused BLE swelling    Back Pain  This is a chronic problem. The current episode started more than 1 year ago. The problem occurs constantly. The problem has been gradually worsening since onset. The pain is present in the lumbar spine. The quality of the pain is described as aching (throbbing). Radiates to: to bilateral legs L > R  The pain is at a severity " of 8/10. The symptoms are aggravated by standing, sitting, twisting, bending, lying down and position (walking long distances, weather changes). Stiffness is present All day. Pertinent negatives include no abdominal pain, chest pain, dysuria, fever, headaches, numbness or weakness. He has tried heat and analgesics (rest/reposition, PT in the past (this caused increased pain)) for the symptoms. The treatment provided mild relief.   Joint Pain  This is a chronic (bilateral hands, left shoulder, bilateral knees) problem. The current episode started more than 1 year ago. The problem occurs daily. The problem has been waxing and waning. Associated symptoms include arthralgias. Pertinent negatives include no abdominal pain, chest pain, chills, congestion, coughing, fatigue, fever, headaches, joint swelling, numbness or weakness. The symptoms are aggravated by walking, standing and bending (weather changes, increased physical activity, ). He has tried oral narcotics, position changes, rest, heat, lying down and acetaminophen for the symptoms. The treatment provided moderate relief.      PEG Assessment   What number best describes your pain on average in the past week?8  What number best describes how, during the past week, pain has interfered with your enjoyment of life?8  What number best describes how, during the past week, pain has interfered with your general activity?  8    Review of Pertinent Medical Data ---      The following portions of the patient's history were reviewed and updated as appropriate: allergies, current medications, past family history, past medical history, past social history, past surgical history, and problem list.    Review of Systems   Constitutional:  Negative for activity change, chills, fatigue and fever.   HENT:  Negative for congestion.    Respiratory:  Negative for cough, chest tightness and shortness of breath.    Cardiovascular:  Negative for chest pain.   Gastrointestinal:  Negative  "for abdominal pain, constipation and diarrhea.   Genitourinary:  Negative for difficulty urinating and dysuria.   Musculoskeletal:  Positive for arthralgias and back pain. Negative for joint swelling.   Neurological:  Positive for dizziness. Negative for weakness, light-headedness, numbness and headaches.   Psychiatric/Behavioral:  Negative for agitation, sleep disturbance and suicidal ideas. The patient is not nervous/anxious.      I have reviewed and confirmed the accuracy of the ROS as documented by the MA/LPN/RN MY Perez    Vitals:    10/12/23 1148   BP: 162/60   BP Location: Left arm   Patient Position: Sitting   Cuff Size: Adult   Pulse: 52   Resp: 12   Temp: 98 øF (36.7 øC)   TempSrc: Temporal   SpO2: 95%   Weight: 79.3 kg (174 lb 12.8 oz)   Height: 172.7 cm (67.99\")   PainSc:   8     Objective   Physical Exam  Constitutional:       Appearance: Normal appearance.   HENT:      Head: Normocephalic.   Cardiovascular:      Rate and Rhythm: Normal rate and regular rhythm.   Pulmonary:      Effort: Pulmonary effort is normal.      Breath sounds: Normal breath sounds.   Musculoskeletal:      Cervical back: Normal range of motion.      Lumbar back: Tenderness present. Decreased range of motion. Positive right straight leg raise test and positive left straight leg raise test.      Comments: Diffuse arthritic changes   Skin:     General: Skin is warm and dry.      Capillary Refill: Capillary refill takes less than 2 seconds.   Neurological:      General: No focal deficit present.      Mental Status: He is alert and oriented to person, place, and time.   Psychiatric:         Mood and Affect: Mood normal.         Behavior: Behavior normal.         Thought Content: Thought content normal.         Cognition and Memory: Cognition normal.       Assessment & Plan   Diagnoses and all orders for this visit:    1. Chronic bilateral low back pain without sciatica (Primary)  -     HYDROcodone-acetaminophen (NORCO) "  MG per tablet; Take 1 tablet by mouth Every 8 (Eight) Hours As Needed for Severe Pain. 30 day supply  Dispense: 75 tablet; Refill: 0    2. Chronic pain of both hips  -     HYDROcodone-acetaminophen (NORCO)  MG per tablet; Take 1 tablet by mouth Every 8 (Eight) Hours As Needed for Severe Pain. 30 day supply  Dispense: 75 tablet; Refill: 0    3. Arthralgia of multiple joints    4. Lumbar facet arthropathy    5. Encounter for long-term (current) use of high-risk medication    6. Neuropathy  -     Ibuprofen 3 %, Gabapentin 10 %, Baclofen 2 %, lidocaine 4 %, Ketamine HCl 4 %; Apply 1-2 g topically to the appropriate area as directed 3 (Three) to 4 (Four) times daily.  Dispense: 90 g; Refill: 0    --- The urine drug screen confirmation from 8/14/23 has been reviewed and the result is appropriate based on patient history and RUTH report  --- The patient signed an updated copy of the controlled substance agreement on 6/1/23  --- Trial compound pain cream for bilateral foot neuropathy pain. Prescription sent to [facility]   --- Continue Hydrocodone. DNF 10/19/23. Patient appears stable with current regimen. No adverse effects. Regarding continuation of opioids, there is no evidence of aberrant behavior or any red flags.  The patient continues with appropriate response to opioid therapy. ADL's remain intact by self.   --- Offered referral to PT but patient declined at this time.  --- Follow-up 2 months or sooner if needed    Madhu Santoro reports a pain score of 8.  Given his pain assessment as noted, treatment options were discussed and the following options were decided upon as a follow-up plan to address the patient's pain: continuation of current treatment plan for pain, prescription for opiod analgesics, and prescription for compound pain cream sent to [facility].       RUTH REPORT  As part of the patient's treatment plan, I am prescribing controlled substances. The patient has been made  aware of appropriate use of such medications, including potential risk of somnolence, limited ability to drive and/or work safely, and the potential for dependence or overdose. It has also been made clear that these medications are for use by this patient only, without concomitant use of alcohol or other substances unless prescribed.     Patient has completed prescribing agreement detailing terms of continued prescribing of controlled substances, including monitoring RUTH reports, urine drug screening, and pill counts if necessary. The patient is aware that inappropriate use will results in cessation of prescribing such medications.    As the clinician, I personally reviewed the RUTH from 10/12/23 while the patient was in the office today.    History and physical exam exhibit continued safe and appropriate use of controlled substances.    Dictated utilizing Dragon dictation.

## 2023-10-16 ENCOUNTER — TELEPHONE (OUTPATIENT)
Dept: PAIN MEDICINE | Facility: CLINIC | Age: 78
End: 2023-10-16
Payer: MEDICARE

## 2023-10-16 DIAGNOSIS — G89.29 CHRONIC BILATERAL LOW BACK PAIN WITHOUT SCIATICA: ICD-10-CM

## 2023-10-16 DIAGNOSIS — G89.29 CHRONIC PAIN OF BOTH HIPS: ICD-10-CM

## 2023-10-16 DIAGNOSIS — M54.50 CHRONIC BILATERAL LOW BACK PAIN WITHOUT SCIATICA: ICD-10-CM

## 2023-10-16 DIAGNOSIS — M25.551 CHRONIC PAIN OF BOTH HIPS: ICD-10-CM

## 2023-10-16 DIAGNOSIS — M25.552 CHRONIC PAIN OF BOTH HIPS: ICD-10-CM

## 2023-10-16 NOTE — TELEPHONE ENCOUNTER
PATIENT CALLED AGAIN NEEDING TO SPEAK TO MEDICAL ASSISTANT ASAP REGARDING HIS PAIN MEDICATION. HUB UNABLE TO WARM TRANSFER.

## 2023-10-16 NOTE — TELEPHONE ENCOUNTER
Hub staff attempted to follow warm transfer process and was unsuccessful     Caller: PATIENT    Relationship to patient: SELF    Best call back number: [phone]    Patient is needing: PATIENT WAS CALLING TO DISCUSS NOT BEING ABLE TO FIND HIS MEDICATION, HYDROCODONE-ACETAMINOPHEN 10 MG IN STOCK. PATIENT STATED HE CALLED [facility] AND [facility] AND NEITHER HAVE IT IN STOCK. PATIENT WOULD LIKE A CALL BACK IN REGARDS TO THIS ISSUE. THANK YOU!

## 2023-10-17 RX ORDER — HYDROCODONE BITARTRATE AND ACETAMINOPHEN 5; 325 MG/1; MG/1
2 TABLET ORAL EVERY 8 HOURS PRN
Qty: 150 TABLET | Refills: 0 | Status: ON HOLD | OUTPATIENT
Start: 2023-10-17

## 2023-10-17 RX ORDER — HYDROCODONE BITARTRATE AND ACETAMINOPHEN 10; 325 MG/1; MG/1
1 TABLET ORAL EVERY 8 HOURS PRN
Qty: 75 TABLET | Refills: 0 | Status: CANCELLED | OUTPATIENT
Start: 2023-10-17

## 2023-10-17 NOTE — TELEPHONE ENCOUNTER
Reviewed UDS and RUTH. Both updated and appropriate. Refill appropriate.  Changing medication to 5mg tablets at this time due to pharmacy availability. DNF 10/19/23.

## 2023-10-17 NOTE — TELEPHONE ENCOUNTER
Alondra Maude called today and states that he can't find a pharmacy that has his hydro/apap  mg in stock. [facility] has hydro/apap 5-325 mg in stock and he would like for a Rx to be sent there for the hydro/apap 5-325 mg if possible.

## 2023-10-20 ENCOUNTER — OFFICE VISIT (OUTPATIENT)
Dept: ONCOLOGY | Facility: CLINIC | Age: 78
End: 2023-10-20
Payer: MEDICARE

## 2023-10-20 ENCOUNTER — LAB (OUTPATIENT)
Dept: LAB | Facility: HOSPITAL | Age: 78
End: 2023-10-20
Payer: MEDICARE

## 2023-10-20 VITALS
DIASTOLIC BLOOD PRESSURE: 78 MMHG | RESPIRATION RATE: 18 BRPM | SYSTOLIC BLOOD PRESSURE: 135 MMHG | HEART RATE: 69 BPM | BODY MASS INDEX: 25.99 KG/M2 | TEMPERATURE: 97.7 F | HEIGHT: 68 IN | OXYGEN SATURATION: 99 % | WEIGHT: 171.5 LBS

## 2023-10-20 DIAGNOSIS — D45 POLYCYTHEMIA VERA: ICD-10-CM

## 2023-10-20 DIAGNOSIS — D45 POLYCYTHEMIA VERA: Primary | ICD-10-CM

## 2023-10-20 LAB
BASOPHILS # BLD AUTO: 0.02 10*3/MM3 (ref 0–0.2)
BASOPHILS NFR BLD AUTO: 0.2 % (ref 0–1.5)
DEPRECATED RDW RBC AUTO: 57.7 FL (ref 37–54)
EOSINOPHIL # BLD AUTO: 0.08 10*3/MM3 (ref 0–0.4)
EOSINOPHIL NFR BLD AUTO: 1 % (ref 0.3–6.2)
ERYTHROCYTE [DISTWIDTH] IN BLOOD BY AUTOMATED COUNT: 12.8 % (ref 12.3–15.4)
HCT VFR BLD AUTO: 40 % (ref 37.5–51)
HGB BLD-MCNC: 13.1 G/DL (ref 13–17.7)
IMM GRANULOCYTES # BLD AUTO: 0.25 10*3/MM3 (ref 0–0.05)
IMM GRANULOCYTES NFR BLD AUTO: 3.1 % (ref 0–0.5)
LYMPHOCYTES # BLD AUTO: 1.39 10*3/MM3 (ref 0.7–3.1)
LYMPHOCYTES NFR BLD AUTO: 17.3 % (ref 19.6–45.3)
MCH RBC QN AUTO: 39.5 PG (ref 26.6–33)
MCHC RBC AUTO-ENTMCNC: 32.8 G/DL (ref 31.5–35.7)
MCV RBC AUTO: 120.5 FL (ref 79–97)
MONOCYTES # BLD AUTO: 0.88 10*3/MM3 (ref 0.1–0.9)
MONOCYTES NFR BLD AUTO: 10.9 % (ref 5–12)
NEUTROPHILS NFR BLD AUTO: 5.42 10*3/MM3 (ref 1.7–7)
NEUTROPHILS NFR BLD AUTO: 67.5 % (ref 42.7–76)
NRBC BLD AUTO-RTO: 0 /100 WBC (ref 0–0.2)
PLATELET # BLD AUTO: 247 10*3/MM3 (ref 140–450)
PMV BLD AUTO: 10 FL (ref 6–12)
RBC # BLD AUTO: 3.32 10*6/MM3 (ref 4.14–5.8)
VIT B12 BLD-MCNC: 933 PG/ML (ref 211–946)
WBC NRBC COR # BLD: 8.04 10*3/MM3 (ref 3.4–10.8)

## 2023-10-20 PROCEDURE — 85025 COMPLETE CBC W/AUTO DIFF WBC: CPT

## 2023-10-20 PROCEDURE — 36415 COLL VENOUS BLD VENIPUNCTURE: CPT

## 2023-10-20 PROCEDURE — 82607 VITAMIN B-12: CPT | Performed by: PSYCHIATRY & NEUROLOGY

## 2023-10-20 NOTE — PROGRESS NOTES
REASON FOR FOLLOW-UP: Polycythemia vera, JAK2 mutation detected    HISTORY OF PRESENT ILLNESS:  Mr. Santoro is a 77 y.o. male with the above-mentioned history who returns to the office today for follow-up and lab review continuing on Hydrea currently on 500 mg every other day.     He is doing well with this dose with no new problems or concerns.     Hematologic/oncologic history:     The patient is a 77-year-old male followed with a number of issues including coronary artery disease, status post CABG, atrial flutter, previous CVA hyperlipidemia, GE reflux, carotid vascular disease, and hypertension.     He had been seen by vascular 2/25/2022 with mild progression of carotid disease but otherwise stable and also had follow-up with pain management for back pain 3/28/2022 requiring chronic narcotic therapy.  Patient has been resistant to epidural like procedures.     Unfortunately has had concurrent constipation requiring laxatives and additional opioid antagonist to reduce GI hypomotility.  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     Patient recently reviewed again by pain management with worsening pain.  Patient also saw GI periodically undergoing a follow-up MRI of the abdomen 8/9/2022 with scattered pancreatic cystic lesions unchanged from previous.  He had previously undergone EGD and colonoscopy 11/5/2021 with irregular Z-line and biopsies taken in nonbleeding internal hemorrhoids found during retroflexion that were small.  There are scattered small and large mouth diverticula found in the sigmoid colon descending colon and splenic flexure.       The patient now presents for thrombocytosis with review of his record over the last year demonstrating a platelet count that is escalated from 3-400,000 now to 8/11/2022 648,000 but concurrent microcytic  and hypochromic indices.  These indices have been slowly reducing over the last 2 years approximately followed more closely.        These findings are discussed with the patient 8/16/2022 who indicates that he actually feels about the same though still has issues with back pain.  He is noted no change in bowel habit including, fortunately, improvement of his constipation without the use of additional medication such as Movantik.  He has not noted any change in the color of his stool including dark stools or any blood per rectum, urine though he does note periodic excess bruising from his anticoagulation therapy.       The patient moved to a trial of iron which, unfortunately, worsened his constipation in which he had discontinue. He had further issues in early September with left renal stone, requiring cystoscopy, stent placement 8/23/2022.       He is next seen back 9/28/2022 with repeat studies performed 9/21 demonstrating 5% iron saturation, ferritin of 12.5.  He remains further weight and fatigue, again oral iron intolerant and will require IV iron preparations for his iron deficiency anemia.      The patient is next seen 1/18/2023 with considerable improvement in his testing including H&H now 17.2 and 55.4 though with iron saturation of 8% and ferritin 26.5.  He has not noted any blood loss but remains generally weak and with ongoing periodic abdominal pain.  His major concerns continue to be a disequilibrium since his previous CVA symptoms.  He has not been seen in follow-up by neurology.  He continues to have hematuria by urinary assessment but not gross findings.  We have discussed that he may actually have a myeloproliferative disorder and requested that he undergo an assessment for JAK2 analysis today.    He is seen back 4/17/2023JAK  2-V617 F mutation having been detected.  In the interval he was admitted 2/19-21/23 for weakness, fall and ER evaluation not demonstrate any acute intracranial process, CT of  lumbar spine with lumbar degenerative disease and other studies not show any acute abnormalities.  Fortunately he went on to improve though his wife had a positive COVID test recently on home examination.  His follow-up testing 4/10/2023 include an H&H of 18.3 and 60.9 white count of 14,700 platelet count of 477,000.    He is seen 4/17/2023 and we discussed that he is better served with phlebotomy at this point.  He states he feels so poorly that he is quite willing to try to move to additional therapies including phlebotomy.    Patient initiating Hydrea 1000 mg daily as of 5/19/2023.    He is next seen back in office 6/30/2023.  He is gradually seen an improvement in his hemoglobin hematocrit dropping to a normal CBC approximately 6/30/2023 H&H of 14.1 and 46.6, white count of 4800 and platelet count of 146,000.  He is tolerating treatment well without additional side effects.    Patient seen 9/21/2023 with H&H 11.1 and 32.9, white count 5110, platelet count 151,000.  Patient without additional side effect from Hydrea though dose is now reduced to 500 mg every other day.  Past Medical History:   Diagnosis Date    Anxiety     Arthritis     Atrial flutter     Status post cavotricuspid isthmus ablation by Dr. Gustafson on 4/18/17    Mandel esophagus     Benign essential hypertension     CAD (coronary artery disease)     3 vessel CABG 4/11/17 by Dr. Cortez: ROSARIO-prox LAD, SVG-OM1, SVG-OM3    Carotid artery disease     Status post carotid endarterectomy - [facility] 4/10/17: 50-59% NICHELLE, 1-15% LICA.     Colonic polyp     Cyst of pancreas     DDD (degenerative disc disease), lumbosacral     GERD (gastroesophageal reflux disease)     H/O bone density study 2013    H/O complete eye exam 2014    History of kidney stone     HLD (hyperlipidemia)     Hypertension     Kidney stone     8/22/22    Lipid screening 05/31/2013    Low back pain     physical therapy [facility] 5-12-10    Screening for prostate cancer 07/07/2015    Skin  "cancer     nose    Stroke     RESIDUAL--\"BALANCE ISSUES\"        Past Surgical History:   Procedure Laterality Date    CARDIAC CATHETERIZATION N/A 04/10/2017    Procedure: Left Heart Cath;  Surgeon: Marjorie Healy MD;  Location: [facility] CATH INVASIVE LOCATION;  Service:     CARDIAC CATHETERIZATION N/A 04/10/2017    Procedure: Coronary angiography;  Surgeon: Marjorie Healy MD;  Location: [facility] CATH INVASIVE LOCATION;  Service:     CARDIAC CATHETERIZATION N/A 04/10/2017    Procedure: Left ventriculography;  Surgeon: Marjorie Healy MD;  Location: [facility] CATH INVASIVE LOCATION;  Service:     CARDIAC CATHETERIZATION  2011    CARDIAC ELECTROPHYSIOLOGY PROCEDURE N/A 04/18/2017    Procedure: Ablation atrial flutter;  Surgeon: Jose Antonio Gustafson MD;  Location: [facility] CATH INVASIVE LOCATION;  Service:     CAROTID ENDARTERECTOMY      CHOLECYSTECTOMY      CHOLECYSTECTOMY WITH INTRAOPERATIVE CHOLANGIOGRAM N/A 09/07/2019    Procedure: Laparoscopic cholecystectomy with intraoperative cholangiogram;  Surgeon: Gauri Travis MD;  Location: [facility] MAIN OR;  Service: General    COLONOSCOPY  01/06/2015    Diverticulosis, one TA    COLONOSCOPY N/A 02/14/2019    tics, NBIH, adenomatous polyp x 2    COLONOSCOPY N/A 11/05/2021    Procedure: COLONOSCOPY TO CECUM AND TERM. ILEUM WITH COLD POLYPECTOMIES;  Surgeon: Everton Abel MD;  Location: [facility] ENDOSCOPY;  Service: Gastroenterology;  Laterality: N/A;  PRE OP - PERS H/O POLYPS  POST OP - COLON POLYPS,, DIVERTICULOSIS, HEMORRHOIDS    CORONARY ARTERY BYPASS GRAFT N/A 04/11/2017    Procedure: AR STERNOTOMY CORONARY ARTERY BYPASS GRAFT TIMES 3 USING LEFT INTERNAL MAMMARY ARTERY AND LEFT GREATER SAPHENOUS VEIN GRAFT PER ENDOSCOPIC VEIN HARVESTING AND PRP ;  Surgeon: Temo Cortez MD;  Location: [facility] MAIN OR;  Service:     ENDOSCOPY  01/06/2015    HH, Mandel's esophagus    ENDOSCOPY N/A 02/14/2019    Z line irregular, HH, Mandel's esophagus    ENDOSCOPY N/A 11/05/2021    " Procedure: ESOPHAGOGASTRODUODENOSCOPY WITH BIOPSIES;  Surgeon: Everton Abel MD;  Location: [facility] ENDOSCOPY;  Service: Gastroenterology;  Laterality: N/A;  PRE OP - PERS H/O ERVIN'S  POST OP - IRREG Z LINE    KNEE SURGERY Left     URETEROSCOPY LASER LITHOTRIPSY WITH STENT INSERTION Left 8/23/2022    Procedure: Cysto retrograde with left uretro stent placement;  Surgeon: Damien Oliveira MD;  Location: [facility] MAIN OR;  Service: Urology;  Laterality: Left;    VASECTOMY          Current Facility-Administered Medications on File Prior to Visit   Medication Dose Route Frequency Provider Last Rate Last Admin    cyanocobalamin injection 1,000 mcg  1,000 mcg Intramuscular Q30 Days Damian Sanchez MD   1,000 mcg at 04/06/23 0957     Current Outpatient Medications on File Prior to Visit   Medication Sig Dispense Refill    allopurinol (Zyloprim) 300 MG tablet Take 1 tablet by mouth Daily. 90 tablet 1    amitriptyline (ELAVIL) 50 MG tablet TAKE ONE TABLET BY MOUTH ONCE NIGHTLY 30 tablet 3    amLODIPine (NORVASC) 2.5 MG tablet Take 1 tablet by mouth Daily. 90 tablet 3    apixaban (ELIQUIS) 5 MG tablet tablet Take 1 tablet by mouth 2 (Two) Times a Day. 90 tablet 3    furosemide (LASIX) 20 MG tablet TAKE ONE TABLET BY MOUTH DAILY 7 tablet 1    HYDROcodone-acetaminophen (NORCO)  MG per tablet Take 1 tablet by mouth Every 8 (Eight) Hours As Needed for Severe Pain. 30 day supply 75 tablet 0    HYDROcodone-acetaminophen (NORCO) 5-325 MG per tablet Take 2 tablets by mouth Every 8 (Eight) Hours As Needed for Severe Pain. 30 day supply. DNF 10/19/23 150 tablet 0    hydroxyurea (HYDREA) 500 MG capsule Take 1 capsule by mouth Every Other Day. 15 capsule 5    Ibuprofen 3 %, Gabapentin 10 %, Baclofen 2 %, lidocaine 4 %, Ketamine HCl 4 % Apply 1-2 g topically to the appropriate area as directed 3 (Three) to 4 (Four) times daily. 90 g 0    losartan (Cozaar) 50 MG tablet Take 1 tablet by mouth Daily. 90 tablet 1    mupirocin  (BACTROBAN) 2 % cream Apply 1 application topically to the appropriate area as directed 2 (Two) Times a Day. 15 g 0    Myrbetriq 25 MG tablet sustained-release 24 hour 24 hr tablet Take 1 tablet by mouth Daily.      potassium chloride 10 MEQ CR tablet TAKE ONE TABLET BY MOUTH DAILY 7 tablet 1    RABEprazole (Aciphex) 20 MG EC tablet Take 1 tablet by mouth Daily. 90 tablet 1    rosuvastatin (Crestor) 20 MG tablet Take 1 tablet by mouth Daily. 90 tablet 1        ALLERGIES:    Allergies   Allergen Reactions    Atorvastatin Myalgia     Myalgia    Penicillins Hives, Swelling and Rash     Tolerates cephalosporins         Social History     Socioeconomic History    Marital status:      Spouse name: Brooke    Years of education: 9th grade   Tobacco Use    Smoking status: Former     Packs/day: 1     Types: Cigarettes     Start date: 1959     Quit date: 2009     Years since quittin.8    Smokeless tobacco: Never    Tobacco comments:     CAFFEINE USE   Vaping Use    Vaping Use: Never used   Substance and Sexual Activity    Alcohol use: Not Currently     Comment: rarely    Drug use: No    Sexual activity: Defer     Partners: Female        Family History   Problem Relation Age of Onset    Hypertension Mother     Heart disease Mother     Heart attack Mother     Stroke Mother     Heart disease Father     Heart attack Father     Stroke Father     Hypertension Sister     Heart attack Brother     Heart disease Brother     No Known Problems Brother     Heart disease Brother     Heart attack Brother     Diabetes Brother     No Known Problems Maternal Grandmother     No Known Problems Maternal Grandfather     No Known Problems Paternal Grandmother     No Known Problems Paternal Grandfather     Pancreatic cancer Paternal Cousin     Arthritis Other     Diabetes Other     Hypertension Other     Kidney disease Other         stones    Malig Hyperthermia Neg Hx         Review of Systems   As per HPI    Objective     Vitals:  "   10/20/23 1346   BP: 135/78   Pulse: 69   Resp: 18   Temp: 97.7 °F (36.5 °C)   TempSrc: Temporal   SpO2: 99%   Weight: 77.8 kg (171 lb 8 oz)   Height: 172.7 cm (67.99\")   PainSc:   7   PainLoc: Back           10/20/2023     1:51 PM   Current Status   ECOG score 0     Physical Exam  Vitals reviewed.   Constitutional:       General: He is not in acute distress.     Appearance: Normal appearance. He is well-developed.   HENT:      Head: Normocephalic and atraumatic.   Eyes:      Pupils: Pupils are equal, round, and reactive to light.   Cardiovascular:      Rate and Rhythm: Normal rate and regular rhythm.      Heart sounds: Normal heart sounds. No murmur heard.  Pulmonary:      Effort: Pulmonary effort is normal. No respiratory distress.      Breath sounds: Normal breath sounds.   Abdominal:      Palpations: Abdomen is soft.   Musculoskeletal:         General: Normal range of motion.      Cervical back: Normal range of motion.   Skin:     General: Skin is warm and dry.      Findings: No rash.   Neurological:      Mental Status: He is alert and oriented to person, place, and time.       I have reexamined the patient and the results are consistent with the previously documented exam. MY Ramos       RECENT LABS:  Results from last 7 days   Lab Units 10/23/23  0507 10/20/23  1326   WBC 10*3/mm3 8.40 8.04   NEUTROS ABS 10*3/mm3 7.47* 5.42   LYMPHS ABS 10*3/mm3 0.00*  --    HEMOGLOBIN g/dL 13.1 13.1   HEMATOCRIT % 38.9 40.0   PLATELETS 10*3/mm3 208 247       Results from last 7 days   Lab Units 10/23/23  0548   SODIUM mmol/L 136   POTASSIUM mmol/L 4.2   CHLORIDE mmol/L 102   CO2 mmol/L 23.0   BUN mg/dL 17   CREATININE mg/dL 1.00   CALCIUM mg/dL 9.0   ALBUMIN g/dL 3.9   BILIRUBIN mg/dL 0.7   ALK PHOS U/L 191*   ALT (SGPT) U/L 31   AST (SGOT) U/L 49*   GLUCOSE mg/dL 138*               Assessment & Plan      77 y.o. male  with medical issues including coronary artery disease, status post CABG, atrial flutter " status post ablation, previous CVA, hyperlipidemia, GE reflux, cardiovascular disease and hypertension.  He also has chronic back pain followed by pain management requiring chronic narcotic therapy.  His medical issues per tickly cardiovascular and CVA led to eventual placement on aspirin and Eliquis chronically.  He has additionally been seen by GI including review of scattered cystic lesions in the pancreas and EGD colonoscopy as recently as November 2021.      *Polycythemia vera  The patient now presents for thrombocytosis with review of his record over the last year demonstrating a platelet count that is escalated from 3-400,000 now to 8/11/2022 648,000 but concurrent microcytic and hypochromic indices.  These indices have been slowly reducing over the last 2 years approximately followed more closely.  Concurrently is a mild drop in his baseline hemoglobin still well within normal limits.  thrombocytosis thought, initially, to be related to iron deficiency.  Ferritin found to be 16.3 and iron of 26 with 5% saturation and TIBC 511.   The patient was placed on ferrous gluconate which, unfortunately, he was unable to tolerate with worsening constipation.  He had further issues in early September with left renal stone, requiring cystoscopy, stent placement 8/23/2022.  9/28/2022 with repeat studies performed 9/21 demonstrating 5% iron saturation, ferritin of 12.5.  He remains further weight and fatigue, again oral iron intolerant and will require IV iron preparations for his iron deficiency anemia.  We discontinued oral iron and proceeded with Injectafer given 9/28/2022 in 10/5/2022  4/17/2023JAK  2-V617 F mutation having been detected.    In the interval he was admitted 2/19-21/23 for weakness, fall and ER evaluation not demonstrate any acute intracranial process, CT of lumbar spine with lumbar degenerative disease and other studies not show any acute abnormalities.  Fortunately he went on to improve though his wife  had a positive COVID test recently on home examination.  4/10/2023 include an H&H of 18.3 and 60.9 white count of 14,700 platelet count of 477,000.  5/15/2023 hemoglobin 15.3, hematocrit 50.4.  Reviewed with Dr. Lopez plans to hold off on phlebotomy and initiate Hydrea 1000 mg daily.  We will tentatively schedule him for return follow-up visit in 2 weeks with repeat labs and reassessment.  6/1/2020 2:23 weeks of Hydrea 1000 mg daily, WBC 5.0, hemoglobin 15.2, hematocrit 49.8, platelets 113,000.  Hydrea was reduced to 500 mg daily  Stability of counts today, 6/15/2023 on 500 mg daily with WBC 4.81, hemoglobin 14.6, hematocrit 46.7%, platelets 156,000  Patient seen 6/30/2023 with H&H of 14.1 and 46.6 white count of 4800 and platelet count of 1 46,000.  Patient subsequently assessed including 9/21/2023 with H&H gradually dropping 11.1 and 32.9, white count of 5110, platelet count 151,000, MCV not 110.8.  10/20/2023 WBC 8.04, SNC 5.42, Hgb 13.1, Platelets 247,000.  Continue Hydrea 500 mg every other day.    PLAN:  Continue Hydrea 500 mg every other day.  Follow-up in 4 to 5 weeks with Dr. Lopez with repeat CBC.  Call/ return sooner should the patient develop any new concerns or problems.      Patient is on a high risk medication requiring close monitoring for toxicity.      Marisol Buckner, APRN  10/20/2023

## 2023-10-21 LAB
GLIADIN PEPTIDE IGA SER-ACNC: 9 UNITS (ref 0–19)
IGA SERPL-MCNC: 544 MG/DL (ref 61–437)
TTG IGA SER-ACNC: <2 U/ML (ref 0–3)

## 2023-10-23 ENCOUNTER — APPOINTMENT (OUTPATIENT)
Dept: CT IMAGING | Facility: HOSPITAL | Age: 78
End: 2023-10-23
Payer: MEDICARE

## 2023-10-23 ENCOUNTER — HOSPITAL ENCOUNTER (INPATIENT)
Facility: HOSPITAL | Age: 78
LOS: 15 days | Discharge: NURSING, REHAB OR LONG-TERM CARE | End: 2023-11-09
Attending: EMERGENCY MEDICINE | Admitting: STUDENT IN AN ORGANIZED HEALTH CARE EDUCATION/TRAINING PROGRAM
Payer: MEDICARE

## 2023-10-23 ENCOUNTER — APPOINTMENT (OUTPATIENT)
Dept: GENERAL RADIOLOGY | Facility: HOSPITAL | Age: 78
End: 2023-10-23
Payer: MEDICARE

## 2023-10-23 DIAGNOSIS — M25.551 CHRONIC PAIN OF BOTH HIPS: ICD-10-CM

## 2023-10-23 DIAGNOSIS — M25.552 CHRONIC PAIN OF BOTH HIPS: ICD-10-CM

## 2023-10-23 DIAGNOSIS — R79.89 ELEVATED TROPONIN: ICD-10-CM

## 2023-10-23 DIAGNOSIS — U07.1 COVID-19: Primary | ICD-10-CM

## 2023-10-23 DIAGNOSIS — G89.29 CHRONIC BILATERAL LOW BACK PAIN WITHOUT SCIATICA: ICD-10-CM

## 2023-10-23 DIAGNOSIS — G89.29 CHRONIC PAIN OF BOTH HIPS: ICD-10-CM

## 2023-10-23 DIAGNOSIS — R53.1 GENERALIZED WEAKNESS: ICD-10-CM

## 2023-10-23 DIAGNOSIS — M54.50 CHRONIC BILATERAL LOW BACK PAIN WITHOUT SCIATICA: ICD-10-CM

## 2023-10-23 DIAGNOSIS — J18.9 PNEUMONIA DUE TO INFECTIOUS ORGANISM, UNSPECIFIED LATERALITY, UNSPECIFIED PART OF LUNG: ICD-10-CM

## 2023-10-23 DIAGNOSIS — S00.412A ABRASION OF LEFT EAR, INITIAL ENCOUNTER: ICD-10-CM
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
## 2023-10-23 LAB
ALBUMIN SERPL ELPH-MCNC: 3.7 G/DL (ref 2.9–4.4)
ALBUMIN SERPL-MCNC: 3.9 G/DL (ref 3.5–5.2)
ALBUMIN/GLOB SERPL: 1.2 {RATIO} (ref 0.7–1.7)
ALBUMIN/GLOB SERPL: 1.4 G/DL
ALP SERPL-CCNC: 191 U/L (ref 39–117)
ALPHA1 GLOB SERPL ELPH-MCNC: 0.3 G/DL (ref 0–0.4)
ALPHA2 GLOB SERPL ELPH-MCNC: 0.6 G/DL (ref 0.4–1)
ALT SERPL W P-5'-P-CCNC: 31 U/L (ref 1–41)
ANION GAP SERPL CALCULATED.3IONS-SCNC: 11 MMOL/L (ref 5–15)
ANISOCYTOSIS BLD QL: ABNORMAL
AST SERPL-CCNC: 49 U/L (ref 1–40)
B PARAPERT DNA SPEC QL NAA+PROBE: NOT DETECTED
B PERT DNA SPEC QL NAA+PROBE: NOT DETECTED
B-GLOBULIN SERPL ELPH-MCNC: 1.2 G/DL (ref 0.7–1.3)
BACTERIA UR QL AUTO: NORMAL /HPF
BILIRUB SERPL-MCNC: 0.7 MG/DL (ref 0–1.2)
BILIRUB UR QL STRIP: NEGATIVE
BUN SERPL-MCNC: 17 MG/DL (ref 8–23)
BUN/CREAT SERPL: 17 (ref 7–25)
C PNEUM DNA NPH QL NAA+NON-PROBE: NOT DETECTED
CALCIUM SPEC-SCNC: 9 MG/DL (ref 8.6–10.5)
CHLORIDE SERPL-SCNC: 102 MMOL/L (ref 98–107)
CK SERPL-CCNC: 158 U/L (ref 20–200)
CLARITY UR: CLEAR
CO2 SERPL-SCNC: 23 MMOL/L (ref 22–29)
COLOR UR: YELLOW
CREAT SERPL-MCNC: 1 MG/DL (ref 0.76–1.27)
D-LACTATE SERPL-SCNC: 1.7 MMOL/L (ref 0.5–2)
DEPRECATED RDW RBC AUTO: 55.1 FL (ref 37–54)
EGFRCR SERPLBLD CKD-EPI 2021: 77 ML/MIN/1.73
ERYTHROCYTE [DISTWIDTH] IN BLOOD BY AUTOMATED COUNT: 12.9 % (ref 12.3–15.4)
FLUAV SUBTYP SPEC NAA+PROBE: NOT DETECTED
FLUBV RNA ISLT QL NAA+PROBE: NOT DETECTED
GAMMA GLOB SERPL ELPH-MCNC: 1.1 G/DL (ref 0.4–1.8)
GEN 5 2HR TROPONIN T REFLEX: 29 NG/L
GLOBULIN SER CALC-MCNC: 3.2 G/DL (ref 2.2–3.9)
GLOBULIN UR ELPH-MCNC: 2.7 GM/DL
GLUCOSE SERPL-MCNC: 138 MG/DL (ref 65–99)
GLUCOSE UR STRIP-MCNC: NEGATIVE MG/DL
HADV DNA SPEC NAA+PROBE: NOT DETECTED
HCOV 229E RNA SPEC QL NAA+PROBE: NOT DETECTED
HCOV HKU1 RNA SPEC QL NAA+PROBE: NOT DETECTED
HCOV NL63 RNA SPEC QL NAA+PROBE: NOT DETECTED
HCOV OC43 RNA SPEC QL NAA+PROBE: NOT DETECTED
HCT VFR BLD AUTO: 38.9 % (ref 37.5–51)
HGB BLD-MCNC: 13.1 G/DL (ref 13–17.7)
HGB UR QL STRIP.AUTO: ABNORMAL
HMPV RNA NPH QL NAA+NON-PROBE: NOT DETECTED
HPIV1 RNA ISLT QL NAA+PROBE: NOT DETECTED
HPIV2 RNA SPEC QL NAA+PROBE: NOT DETECTED
HPIV3 RNA NPH QL NAA+PROBE: NOT DETECTED
HPIV4 P GENE NPH QL NAA+PROBE: NOT DETECTED
HYALINE CASTS UR QL AUTO: NORMAL /LPF
IGA SERPL-MCNC: 542 MG/DL (ref 61–437)
IGG SERPL-MCNC: 936 MG/DL (ref 603–1613)
IGM SERPL-MCNC: 288 MG/DL (ref 15–143)
KETONES UR QL STRIP: NEGATIVE
LABORATORY COMMENT REPORT: NORMAL
LEUKOCYTE ESTERASE UR QL STRIP.AUTO: NEGATIVE
LYMPHOCYTES # BLD MANUAL: 0 10*3/MM3 (ref 0.7–3.1)
LYMPHOCYTES NFR BLD MANUAL: 11.1 % (ref 5–12)
M PNEUMO IGG SER IA-ACNC: NOT DETECTED
M PROTEIN SERPL ELPH-MCNC: NORMAL G/DL
MACROCYTES BLD QL SMEAR: ABNORMAL
MCH RBC QN AUTO: 38.4 PG (ref 26.6–33)
MCHC RBC AUTO-ENTMCNC: 33.7 G/DL (ref 31.5–35.7)
MCV RBC AUTO: 114.1 FL (ref 79–97)
MONOCYTES # BLD: 0.93 10*3/MM3 (ref 0.1–0.9)
NEUTROPHILS # BLD AUTO: 7.47 10*3/MM3 (ref 1.7–7)
NEUTROPHILS NFR BLD MANUAL: 88.9 % (ref 42.7–76)
NITRITE UR QL STRIP: NEGATIVE
NRBC BLD AUTO-RTO: 0 /100 WBC (ref 0–0.2)
PH UR STRIP.AUTO: 6.5 [PH] (ref 5–8)
PLAT MORPH BLD: NORMAL
PLATELET # BLD AUTO: 208 10*3/MM3 (ref 140–450)
PMV BLD AUTO: 10.3 FL (ref 6–12)
POTASSIUM SERPL-SCNC: 4.2 MMOL/L (ref 3.5–5.2)
PROT PATTERN SERPL ELPH-IMP: NORMAL
PROT PATTERN SERPL IFE-IMP: ABNORMAL
PROT SERPL-MCNC: 6.6 G/DL (ref 6–8.5)
PROT SERPL-MCNC: 6.9 G/DL (ref 6–8.5)
PROT UR QL STRIP: ABNORMAL
QT INTERVAL: 347 MS
QTC INTERVAL: 441 MS
RBC # BLD AUTO: 3.41 10*6/MM3 (ref 4.14–5.8)
RBC # UR STRIP: NORMAL /HPF
REF LAB TEST METHOD: NORMAL
RHINOVIRUS RNA SPEC NAA+PROBE: NOT DETECTED
RSV RNA NPH QL NAA+NON-PROBE: NOT DETECTED
SARS-COV-2 RNA NPH QL NAA+NON-PROBE: DETECTED
SODIUM SERPL-SCNC: 136 MMOL/L (ref 136–145)
SP GR UR STRIP: 1.02 (ref 1–1.03)
SQUAMOUS #/AREA URNS HPF: NORMAL /HPF
TROPONIN T DELTA: 4 NG/L
TROPONIN T SERPL HS-MCNC: 25 NG/L
UROBILINOGEN UR QL STRIP: ABNORMAL
VARIANT LYMPHS NFR BLD MANUAL: 0 % (ref 19.6–45.3)
WBC # UR STRIP: NORMAL /HPF
WBC MORPH BLD: NORMAL
WBC NRBC COR # BLD: 8.4 10*3/MM3 (ref 3.4–10.8)

## 2023-10-23 PROCEDURE — 80053 COMPREHEN METABOLIC PANEL: CPT | Performed by: EMERGENCY MEDICINE

## 2023-10-23 PROCEDURE — 93010 ELECTROCARDIOGRAM REPORT: CPT | Performed by: INTERNAL MEDICINE

## 2023-10-23 PROCEDURE — P9612 CATHETERIZE FOR URINE SPEC: HCPCS

## 2023-10-23 PROCEDURE — 25810000003 LACTATED RINGERS SOLUTION: Performed by: PHYSICIAN ASSISTANT

## 2023-10-23 PROCEDURE — 70450 CT HEAD/BRAIN W/O DYE: CPT

## 2023-10-23 PROCEDURE — G0378 HOSPITAL OBSERVATION PER HR: HCPCS

## 2023-10-23 PROCEDURE — 99285 EMERGENCY DEPT VISIT HI MDM: CPT

## 2023-10-23 PROCEDURE — 84484 ASSAY OF TROPONIN QUANT: CPT | Performed by: EMERGENCY MEDICINE

## 2023-10-23 PROCEDURE — 93005 ELECTROCARDIOGRAM TRACING: CPT | Performed by: EMERGENCY MEDICINE

## 2023-10-23 PROCEDURE — 25010000002 REMDESIVIR 100 MG/20ML SOLUTION 1 EACH VIAL: Performed by: STUDENT IN AN ORGANIZED HEALTH CARE EDUCATION/TRAINING PROGRAM

## 2023-10-23 PROCEDURE — 85007 BL SMEAR W/DIFF WBC COUNT: CPT | Performed by: EMERGENCY MEDICINE

## 2023-10-23 PROCEDURE — 51798 US URINE CAPACITY MEASURE: CPT

## 2023-10-23 PROCEDURE — XW033E5 INTRODUCTION OF REMDESIVIR ANTI-INFECTIVE INTO PERIPHERAL VEIN, PERCUTANEOUS APPROACH, NEW TECHNOLOGY GROUP 5: ICD-10-PCS | Performed by: HOSPITALIST

## 2023-10-23 PROCEDURE — 25810000003 SODIUM CHLORIDE 0.9 % SOLUTION 250 ML FLEX CONT: Performed by: STUDENT IN AN ORGANIZED HEALTH CARE EDUCATION/TRAINING PROGRAM

## 2023-10-23 PROCEDURE — 82550 ASSAY OF CK (CPK): CPT | Performed by: PHYSICIAN ASSISTANT

## 2023-10-23 PROCEDURE — 81001 URINALYSIS AUTO W/SCOPE: CPT | Performed by: EMERGENCY MEDICINE

## 2023-10-23 PROCEDURE — 0202U NFCT DS 22 TRGT SARS-COV-2: CPT | Performed by: PHYSICIAN ASSISTANT

## 2023-10-23 PROCEDURE — 83605 ASSAY OF LACTIC ACID: CPT | Performed by: EMERGENCY MEDICINE

## 2023-10-23 PROCEDURE — 85025 COMPLETE CBC W/AUTO DIFF WBC: CPT | Performed by: EMERGENCY MEDICINE

## 2023-10-23 PROCEDURE — 71045 X-RAY EXAM CHEST 1 VIEW: CPT

## 2023-10-23 RX ORDER — OXYBUTYNIN CHLORIDE 5 MG/1
5 TABLET, EXTENDED RELEASE ORAL DAILY
Status: DISCONTINUED | OUTPATIENT
Start: 2023-10-23 | End: 2023-11-09

## 2023-10-23 RX ORDER — BISACODYL 10 MG
10 SUPPOSITORY, RECTAL RECTAL DAILY PRN
Status: DISCONTINUED | OUTPATIENT
Start: 2023-10-23 | End: 2023-11-09 | Stop reason: HOSPADM

## 2023-10-23 RX ORDER — ONDANSETRON 4 MG/1
4 TABLET, FILM COATED ORAL EVERY 6 HOURS PRN
Status: DISCONTINUED | OUTPATIENT
Start: 2023-10-23 | End: 2023-11-09 | Stop reason: HOSPADM

## 2023-10-23 RX ORDER — HYDROCODONE BITARTRATE AND ACETAMINOPHEN 10; 325 MG/1; MG/1
1 TABLET ORAL EVERY 8 HOURS PRN
Status: DISCONTINUED | OUTPATIENT
Start: 2023-10-23 | End: 2023-11-09 | Stop reason: HOSPADM

## 2023-10-23 RX ORDER — LOSARTAN POTASSIUM 50 MG/1
50 TABLET ORAL DAILY
Status: DISCONTINUED | OUTPATIENT
Start: 2023-10-24 | End: 2023-10-26

## 2023-10-23 RX ORDER — SODIUM CHLORIDE 0.9 % (FLUSH) 0.9 %
10 SYRINGE (ML) INJECTION EVERY 12 HOURS SCHEDULED
Status: DISCONTINUED | OUTPATIENT
Start: 2023-10-23 | End: 2023-10-26

## 2023-10-23 RX ORDER — ACETAMINOPHEN 500 MG
1000 TABLET ORAL ONCE
Status: COMPLETED | OUTPATIENT
Start: 2023-10-23 | End: 2023-10-23

## 2023-10-23 RX ORDER — AMITRIPTYLINE HYDROCHLORIDE 50 MG/1
50 TABLET, FILM COATED ORAL NIGHTLY
Status: DISCONTINUED | OUTPATIENT
Start: 2023-10-23 | End: 2023-11-09 | Stop reason: HOSPADM

## 2023-10-23 RX ORDER — CHOLECALCIFEROL (VITAMIN D3) 125 MCG
5 CAPSULE ORAL NIGHTLY PRN
Status: DISCONTINUED | OUTPATIENT
Start: 2023-10-23 | End: 2023-10-26

## 2023-10-23 RX ORDER — SODIUM CHLORIDE 0.9 % (FLUSH) 0.9 %
10 SYRINGE (ML) INJECTION AS NEEDED
Status: DISCONTINUED | OUTPATIENT
Start: 2023-10-23 | End: 2023-11-09 | Stop reason: HOSPADM

## 2023-10-23 RX ORDER — PANTOPRAZOLE SODIUM 40 MG/1
40 TABLET, DELAYED RELEASE ORAL
Status: DISCONTINUED | OUTPATIENT
Start: 2023-10-24 | End: 2023-10-26

## 2023-10-23 RX ORDER — ROSUVASTATIN CALCIUM 20 MG/1
20 TABLET, COATED ORAL DAILY
Status: DISCONTINUED | OUTPATIENT
Start: 2023-10-23 | End: 2023-10-26

## 2023-10-23 RX ORDER — SODIUM CHLORIDE 0.9 % (FLUSH) 0.9 %
10 SYRINGE (ML) INJECTION AS NEEDED
Status: DISCONTINUED | OUTPATIENT
Start: 2023-10-23 | End: 2023-10-26

## 2023-10-23 RX ORDER — ACETAMINOPHEN 325 MG/1
650 TABLET ORAL EVERY 4 HOURS PRN
Status: DISCONTINUED | OUTPATIENT
Start: 2023-10-23 | End: 2023-11-09 | Stop reason: HOSPADM

## 2023-10-23 RX ORDER — AMLODIPINE BESYLATE 5 MG/1
2.5 TABLET ORAL DAILY
Status: DISCONTINUED | OUTPATIENT
Start: 2023-10-24 | End: 2023-10-26

## 2023-10-23 RX ORDER — ALLOPURINOL 300 MG/1
300 TABLET ORAL DAILY
Status: DISCONTINUED | OUTPATIENT
Start: 2023-10-23 | End: 2023-11-03

## 2023-10-23 RX ORDER — BISACODYL 5 MG/1
5 TABLET, DELAYED RELEASE ORAL DAILY PRN
Status: DISCONTINUED | OUTPATIENT
Start: 2023-10-23 | End: 2023-11-09 | Stop reason: HOSPADM

## 2023-10-23 RX ORDER — SODIUM CHLORIDE 9 MG/ML
40 INJECTION, SOLUTION INTRAVENOUS AS NEEDED
Status: DISCONTINUED | OUTPATIENT
Start: 2023-10-23 | End: 2023-10-26

## 2023-10-23 RX ORDER — ALUMINA, MAGNESIA, AND SIMETHICONE 2400; 2400; 240 MG/30ML; MG/30ML; MG/30ML
10 SUSPENSION ORAL ONCE
Status: COMPLETED | OUTPATIENT
Start: 2023-10-23 | End: 2023-10-23

## 2023-10-23 RX ORDER — AMOXICILLIN 250 MG
2 CAPSULE ORAL 2 TIMES DAILY PRN
Status: DISCONTINUED | OUTPATIENT
Start: 2023-10-23 | End: 2023-11-09 | Stop reason: HOSPADM

## 2023-10-23 RX ORDER — POLYETHYLENE GLYCOL 3350 17 G/17G
17 POWDER, FOR SOLUTION ORAL DAILY PRN
Status: DISCONTINUED | OUTPATIENT
Start: 2023-10-23 | End: 2023-11-09 | Stop reason: HOSPADM

## 2023-10-23 RX ORDER — ACETAMINOPHEN 650 MG/1
650 SUPPOSITORY RECTAL EVERY 4 HOURS PRN
Status: DISCONTINUED | OUTPATIENT
Start: 2023-10-23 | End: 2023-11-09 | Stop reason: HOSPADM

## 2023-10-23 RX ORDER — ONDANSETRON 2 MG/ML
4 INJECTION INTRAMUSCULAR; INTRAVENOUS EVERY 6 HOURS PRN
Status: DISCONTINUED | OUTPATIENT
Start: 2023-10-23 | End: 2023-11-09 | Stop reason: HOSPADM

## 2023-10-23 RX ORDER — ACETAMINOPHEN 160 MG/5ML
650 SOLUTION ORAL EVERY 4 HOURS PRN
Status: DISCONTINUED | OUTPATIENT
Start: 2023-10-23 | End: 2023-11-09 | Stop reason: HOSPADM

## 2023-10-23 RX ORDER — HYDROXYUREA 500 MG/1
500 CAPSULE ORAL EVERY OTHER DAY
Status: DISCONTINUED | OUTPATIENT
Start: 2023-10-23 | End: 2023-10-26

## 2023-10-23 RX ADMIN — Medication 10 ML: at 12:09

## 2023-10-23 RX ADMIN — OXYBUTYNIN CHLORIDE 5 MG: 5 TABLET, EXTENDED RELEASE ORAL at 16:38

## 2023-10-23 RX ADMIN — AMITRIPTYLINE HYDROCHLORIDE 50 MG: 50 TABLET, FILM COATED ORAL at 20:17

## 2023-10-23 RX ADMIN — ALUMINUM HYDROXIDE, MAGNESIUM HYDROXIDE, AND DIMETHICONE 10 ML: 400; 400; 40 SUSPENSION ORAL at 05:55

## 2023-10-23 RX ADMIN — SODIUM CHLORIDE, POTASSIUM CHLORIDE, SODIUM LACTATE AND CALCIUM CHLORIDE 500 ML: 600; 310; 30; 20 INJECTION, SOLUTION INTRAVENOUS at 08:56

## 2023-10-23 RX ADMIN — ROSUVASTATIN CALCIUM 20 MG: 20 TABLET, FILM COATED ORAL at 16:38

## 2023-10-23 RX ADMIN — REMDESIVIR 200 MG: 100 INJECTION, POWDER, LYOPHILIZED, FOR SOLUTION INTRAVENOUS at 16:38

## 2023-10-23 RX ADMIN — SODIUM CHLORIDE, POTASSIUM CHLORIDE, SODIUM LACTATE AND CALCIUM CHLORIDE 500 ML: 600; 310; 30; 20 INJECTION, SOLUTION INTRAVENOUS at 06:38

## 2023-10-23 RX ADMIN — ALLOPURINOL 300 MG: 300 TABLET ORAL at 16:38

## 2023-10-23 RX ADMIN — HYDROXYUREA 500 MG: 500 CAPSULE ORAL at 16:37

## 2023-10-23 RX ADMIN — APIXABAN 5 MG: 5 TABLET, FILM COATED ORAL at 20:17

## 2023-10-23 RX ADMIN — ACETAMINOPHEN 1000 MG: 500 TABLET ORAL at 06:35

## 2023-10-23 NOTE — PROGRESS NOTES
[facility]  Clinical Pharmacy Department     Remdesivir Review Note  Does patient qualify for nirmatrelvir/ritonavir (Paxlovid)?: No   If no, please check one of the following rationale(s):   [x] Patient is outside 5 days from symptom onset window   [] Patient has a drug-drug interaction that cannot be managed while admitted (I.e. can't dose adjust or hold during Paxlovid therapy)  [] Patient's eGFR precludes them from using Paxlovid (eGFR<30mL/min)  [] Patient qualifies for a 5-day treatment course of Remdesivir (requires supplemental oxygen or SpO2 ? 94% on room air)      Madhu Santoro is a 77 y.o. male with confirmed COVID-19 infection on day 1 of hospitalization.      Consulting Provider:  Dr. Rios  Date of Confirmed SARS-CoV-2: 10/23/23  Date of Symptom Onset: approximately 7 days  Other Antimicrobials: None        Allergies  Allergies as of 10/23/2023 - Reviewed 10/23/2023   Allergen Reaction Noted    Atorvastatin Myalgia 05/23/2018    Penicillins Hives, Swelling, and Rash 05/09/2013       Microbiology:  Microbiology Results (last 10 days)       Procedure Component Value - Date/Time    Respiratory Panel PCR w/COVID-19(SARS-CoV-2) DONTRELL/LESTER/KANCHAN/PAD/COR/MAD/BRIAN In-House, NP Swab in UTM/VTM, 3-4 HR TAT - Swab, Nasopharynx [[id number]]  (Abnormal) Collected: 10/23/23 0650    Lab Status: Final result Specimen: Swab from Nasopharynx Updated: 10/23/23 0809     ADENOVIRUS, PCR Not Detected     Coronavirus 229E Not Detected     Coronavirus HKU1 Not Detected     Coronavirus NL63 Not Detected     Coronavirus OC43 Not Detected     COVID19 Detected     Human Metapneumovirus Not Detected     Human Rhinovirus/Enterovirus Not Detected     Influenza A PCR Not Detected     Influenza B PCR Not Detected     Parainfluenza Virus 1 Not Detected     Parainfluenza Virus 2 Not Detected     Parainfluenza Virus 3 Not Detected     Parainfluenza Virus 4 Not Detected     RSV, PCR Not Detected     Bordetella pertussis pcr Not Detected  "    Bordetella parapertussis PCR Not Detected     Chlamydophila pneumoniae PCR Not Detected     Mycoplasma pneumo by PCR Not Detected    Narrative:      In the setting of a positive respiratory panel with a viral infection PLUS a negative procalcitonin without other underlying concern for bacterial infection, consider observing off antibiotics or discontinuation of antibiotics and continue supportive care. If the respiratory panel is positive for atypical bacterial infection (Bordetella pertussis, Chlamydophila pneumoniae, or Mycoplasma pneumoniae), consider antibiotic de-escalation to target atypical bacterial infection.            Vitals/Labs/I&O  Visit Vitals  /65 (BP Location: Left arm, Patient Position: Lying)   Pulse 82   Temp 97.9 °F (36.6 °C) (Oral)   Resp 18   Ht 172.7 cm (68\")   Wt 78.8 kg (173 lb 12.8 oz)   SpO2 99%   BMI 26.43 kg/m²       Results from last 7 days   Lab Units 10/23/23  0507 10/20/23  1326   WBC 10*3/mm3 8.40 8.04         Results from last 7 days   Lab Units 10/23/23  0548   AST (SGOT) U/L 49*      Results from last 7 days   Lab Units 10/23/23  0548   ALT (SGPT) U/L 31       Estimated Creatinine Clearance: 67.9 mL/min (by C-G formula based on SCr of 1 mg/dL).  Results from last 7 days   Lab Units 10/23/23  0548   BUN mg/dL 17   CREATININE mg/dL 1.00     Intake & Output (last 3 days)         10/20 0701  10/21 0700 10/21 0701  10/22 0700 10/22 0701  10/23 0700 10/23 0701  10/24 0700    P.O.    240    Total Intake(mL/kg)    240 (3)    Net    +240                    Assessment/Plan:    Patient meets the following inclusion/exclusion criteria:  Patient is hospitalized with confirmed COVID-19 infection (and accompanying symptoms)  Patient meets one of the two below criteria:  Patient is requiring an increase in baseline supplemental oxygen requirements OR SpO2 </= 94% on room air secondary to COVID-19 infection OR  Patient is symptomatic but not requiring supplemental oxygen or an increase " in baseline oxygen AND has at least one of the below high risk criteria for progression to severe illness. High risk criteria:   Age >/= 65  Cancer  Cardiovascular diseases (HF, CAD, or cardiomyopathies)  CKD  Chronic lung disease (COPD, CF, interstitial lung disease, PE, pulmonary hypertension, bronchopulmonary dysplasia, bronchiectasis)  Diabetes mellitis (insulin dependent or poorly controlled)  Immunocompromising conditions or receipt of immunosuppressive medications  Obesity (BMI > 35 kg/m2)  Pregnancy and recent pregnancy (within 7 days of delivery)  Sickle cell disease  Baseline and daily LFTs and Scr have been ordered prior to remdesivir initiation  ALT is not ? 10 times the upper limit of normal  Patient is not on concomitant hydroxychloroquine or chloroquine  Patient does not require invasive mechanical ventilation (IMV) or ECMO  Patient is within 10 days from symptom onset (for criteria 2a) or within 7 days of symptom onset (for criteria 2b)    Remdesivir 200 mg IV x 1 dose, followed by 100 mg IV q24h for 2 days or until hospital discharge (whichever comes first) has been ordered.    Thank you for involving pharmacy in this patient's care. Please contact pharmacy with any questions or concerns.                           Magda Randall, PharmD  Clinical Pharmacist

## 2023-10-23 NOTE — ED NOTES
"Nursing report ED to floor  Madhu Santoro  77 y.o.  male    HPI :   Chief Complaint   Patient presents with    Fall       Admitting doctor:   Jewell Rios MD    Admitting diagnosis:   The primary encounter diagnosis was COVID-19. Diagnoses of Generalized weakness, Fall at home, initial encounter, Abrasion of left ear, initial encounter, and Elevated troponin were also pertinent to this visit.    Code status:   Current Code Status       Date Active Code Status Order ID Comments User Context       Prior            Allergies:   Atorvastatin and Penicillins    Isolation:   Enhanced Droplet/Contact     Intake and Output  No intake or output data in the 24 hours ending 10/23/23 1035    Weight:       10/23/23  0536   Weight: 78.8 kg (173 lb 12.8 oz)       Most recent vitals:   Vitals:    10/23/23 0531 10/23/23 0536 10/23/23 0831 10/23/23 0931   BP: 151/63  175/81 (!) 155/102   Pulse: 96  99 95   Resp:   18 16   Temp:  (!) 100.7 °F (38.2 °C)  99.3 °F (37.4 °C)   TempSrc:  Tympanic     SpO2: 95%  94% 96%   Weight:  78.8 kg (173 lb 12.8 oz)     Height:  172.7 cm (68\")         Active LDAs/IV Access:   Lines, Drains & Airways       Active LDAs       Name Placement date Placement time Site Days    Peripheral IV 10/23/23 0505 Anterior;Distal;Right;Upper Arm 10/23/23  0505  Arm  less than 1                    Labs (abnormal labs have a star):   Labs Reviewed   RESPIRATORY PANEL PCR W/ COVID-19 (SARS-COV-2), NP SWAB IN UTM/VTP, 3-4 HR TAT - Abnormal; Notable for the following components:       Result Value    COVID19 Detected (*)     All other components within normal limits    Narrative:     In the setting of a positive respiratory panel with a viral infection PLUS a negative procalcitonin without other underlying concern for bacterial infection, consider observing off antibiotics or discontinuation of antibiotics and continue supportive care. If the respiratory panel is positive for atypical bacterial infection " (Bordetella pertussis, Chlamydophila pneumoniae, or Mycoplasma pneumoniae), consider antibiotic de-escalation to target atypical bacterial infection.   COMPREHENSIVE METABOLIC PANEL - Abnormal; Notable for the following components:    Glucose 138 (*)     AST (SGOT) 49 (*)     Alkaline Phosphatase 191 (*)     All other components within normal limits    Narrative:     GFR Normal >60  Chronic Kidney Disease <60  Kidney Failure <15    The GFR formula is only valid for adults with stable renal function between ages 18 and 70.   URINALYSIS W/ MICROSCOPIC IF INDICATED (NO CULTURE) - Abnormal; Notable for the following components:    Blood, UA Small (1+) (*)     Protein, UA Trace (*)     All other components within normal limits   TROPONIN - Abnormal; Notable for the following components:    HS Troponin T 25 (*)     All other components within normal limits    Narrative:     High Sensitive Troponin T Reference Range:  <10.0 ng/L- Negative Female for AMI  <15.0 ng/L- Negative Male for AMI  >=10 - Abnormal Female indicating possible myocardial injury.  >=15 - Abnormal Male indicating possible myocardial injury.   Clinicians would have to utilize clinical acumen, EKG, Troponin, and serial changes to determine if it is an Acute Myocardial Infarction or myocardial injury due to an underlying chronic condition.        CBC WITH AUTO DIFFERENTIAL - Abnormal; Notable for the following components:    RBC 3.41 (*)     .1 (*)     MCH 38.4 (*)     RDW-SD 55.1 (*)     All other components within normal limits   MANUAL DIFFERENTIAL - Abnormal; Notable for the following components:    Neutrophil % 88.9 (*)     Lymphocyte % 0.0 (*)     Neutrophils Absolute 7.47 (*)     Lymphocytes Absolute 0.00 (*)     Monocytes Absolute 0.93 (*)     All other components within normal limits   HIGH SENSITIVITIY TROPONIN T 2HR - Abnormal; Notable for the following components:    HS Troponin T 29 (*)     Troponin T Delta 4 (*)     All other components  within normal limits    Narrative:     High Sensitive Troponin T Reference Range:  <10.0 ng/L- Negative Female for AMI  <15.0 ng/L- Negative Male for AMI  >=10 - Abnormal Female indicating possible myocardial injury.  >=15 - Abnormal Male indicating possible myocardial injury.   Clinicians would have to utilize clinical acumen, EKG, Troponin, and serial changes to determine if it is an Acute Myocardial Infarction or myocardial injury due to an underlying chronic condition.        LACTIC ACID, PLASMA - Normal   CK - Normal   URINALYSIS, MICROSCOPIC ONLY   CBC AND DIFFERENTIAL    Narrative:     The following orders were created for panel order CBC & Differential.  Procedure                               Abnormality         Status                     ---------                               -----------         ------                     CBC Auto Differential[[id number]]        Abnormal            Final result                 Please view results for these tests on the individual orders.       EKG:   ECG 12 Lead Other; weakness   Final Result   HEART RATE= 97  bpm   RR Interval= 619  ms   OH Interval= 171  ms   P Horizontal Axis= -19  deg   P Front Axis= 54  deg   QRSD Interval= 107  ms   QT Interval= 347  ms   QTcB= 441  ms   QRS Axis= -25  deg   T Wave Axis= 97  deg   - ABNORMAL ECG -   Sinus rhythm   Left atrial enlargement   Incomplete left bundle branch block   LVH with secondary repolarization abnormality   Anterior Q waves, possibly due to LVH   No change from previous tracing   Electronically Signed By: Jg Chaney ([facility]) 23-Oct-2023 06:59:39   Date and Time of Study: 2023-10-23 05:44:15          Meds given in ED:   Medications   sodium chloride 0.9 % flush 10 mL (has no administration in time range)   aluminum-magnesium hydroxide-simethicone (MAALOX MAX) 400-400-40 MG/5ML suspension 10 mL (10 mL Oral Given 10/23/23 2105)   acetaminophen (TYLENOL) tablet 1,000 mg (1,000 mg Oral Given 10/23/23 8835)   lactated  ringers bolus 500 mL (0 mL Intravenous Stopped 10/23/23 08)   lactated ringers bolus 500 mL (500 mL Intravenous New Bag 10/23/23 08)       Imaging results:  CT Head Without Contrast    Result Date: 10/23/2023  1. No acute intracranial abnormality is identified. 2. There is mild-to-moderate small vessel disease in the cerebral white matter. 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 3. The remainder of the head CT is within normal limits. Specifically, no acute skull fracture or intracranial hemorrhage is identified.  Radiation dose reduction techniques were utilized, including automated exposure control and exposure modulation based on body size.       XR Chest 1 View    Result Date: 10/23/2023  No focal consolidation. No pleural effusion or pneumothorax.  Normal size cardiomediastinal silhouette. Nondisplaced median sternotomy wires.   This report was finalized on 10/23/2023 7:51 AM by Dr. Alexander Causey M.D on Workstation: [id number]       Ambulatory status:   - bedrest    Social issues:   Social History     Socioeconomic History    Marital status:      Spouse name: Brooke    Years of education: 9th grade   Tobacco Use    Smoking status: Former     Packs/day: 1     Types: Cigarettes     Start date: 1959     Quit date: 2009     Years since quittin.8    Smokeless tobacco: Never    Tobacco comments:     CAFFEINE USE   Vaping Use    Vaping Use: Never used   Substance and Sexual Activity    Alcohol use: Not Currently     Comment: rarely    Drug use: No    Sexual activity: Defer     Partners: Female       NIH Stroke Scale:       Roseline Mcclain RN  10/23/23 10:35 EDT

## 2023-10-23 NOTE — ED PROVIDER NOTES
EMERGENCY DEPARTMENT ENCOUNTER    Room Number:  3004/1  PCP: Damian Sanchez MD  Discussed/ obtained information from independent historians: patient      HPI:  Chief Complaint: generalized weakness, fall  A complete HPI/ROS/PMH/PSH/SH/FH are unobtainable due to: none  Context: Madhu Santoro is a 77 y.o. male who presents to the ED c/o generalized weakness that has been gradually increasing for the last 1 week.  States this morning he tried to get out of bed to go to the restroom, took great effort to do so and as soon as he tried to stand he fell landing in between a dresser in a wall.  Took him a couple hours to get to his phone to call for emergency services.  Someone else does live in the home but she could not hear him.  He does not recall striking his head, denies any neck or back pain or any extremity injury.  Denies chest pain shortness of breath abdominal pain.  He reports a dry cough for a month.  Chronic urinary frequency which is not significantly worse than usual, denies sore throat, nasal congestion rhinorrhea, hematuria, dysuria, nausea vomiting, abdominal pain, diarrhea, fever.    Patient febrile in triage at 100.7.    External (non-ED) record review: Immunization record reviewed last Tdap administered 9/25/2017, patient is up-to-date.    Patient admitted 2/19/2023 to 2/21/2023 after a fall with generalized weakness.  Was found to have right-sided pneumonia no leukocytosis and was admitted for antibiotics.    PAST MEDICAL HISTORY  Active Ambulatory Problems     Diagnosis Date Noted    Anxiety     Arthritis     Coronary artery disease due to lipid rich plaque     HLD (hyperlipidemia)     Benign essential hypertension     DDD (degenerative disc disease), lumbosacral     Cerebrovascular accident     Encounter for screening for cardiovascular disorders 11/20/2012    Gastroesophageal reflux disease 04/04/2016    Hyperlipidemia 04/04/2016    Stenosis of carotid artery 04/26/2017    Former smoker  04/26/2017    History of cardiac catheterization 12/16/2011    S/P ablation of atrial flutter 04/26/2017    Typical atrial flutter 04/26/2017    S/P CABG (coronary artery bypass graft) 04/26/2017    Adenomatous polyp of ascending colon 02/12/2019    Abdominal bloating 02/12/2019    Stroke 03/29/2019    PAD (peripheral artery disease) 03/29/2019    Acute cholecystitis 09/06/2019    Right upper quadrant abdominal pain 09/06/2019    Chronic pain of both hips 03/31/2021    Chronic bilateral low back pain without sciatica 03/31/2021    Sacroiliac joint dysfunction of both sides 03/31/2021    Encounter for long-term (current) use of high-risk medication 03/31/2021    Lumbar facet arthropathy 03/31/2021    Stroke-like symptoms 04/10/2021    CVA (cerebral vascular accident) 04/11/2021    Personal history of colonic polyps 07/23/2021    Arthralgia of multiple joints 09/28/2021    Iron deficiency 08/16/2022    Thrombocytosis 08/16/2022    Left ureteral stone 08/22/2022    Obstructive uropathy 08/22/2022    Chronic anticoagulation 08/22/2022    Facial skin lesion 08/22/2022    Iron deficiency anemia 09/23/2022    Polycythemia 01/18/2023    Neuropathy 02/08/2023    Weakness 02/19/2023    Pneumonia 02/19/2023    Close exposure to COVID-19 virus 02/19/2023    Polycythemia vera 04/17/2023    Chronic gout without tophus 07/09/2023     Resolved Ambulatory Problems     Diagnosis Date Noted    No Resolved Ambulatory Problems     Past Medical History:   Diagnosis Date    Atrial flutter     Mandel esophagus     CAD (coronary artery disease)     Carotid artery disease     Colonic polyp     Cyst of pancreas     GERD (gastroesophageal reflux disease)     H/O bone density study 2013    H/O complete eye exam 2014    History of kidney stone     Hypertension     Kidney stone     Lipid screening 05/31/2013    Low back pain     Screening for prostate cancer 07/07/2015    Skin cancer          PAST SURGICAL HISTORY  Past Surgical History:    Procedure Laterality Date    CARDIAC CATHETERIZATION N/A 04/10/2017    Procedure: Left Heart Cath;  Surgeon: Marjorie Healy MD;  Location: [facility] CATH INVASIVE LOCATION;  Service:     CARDIAC CATHETERIZATION N/A 04/10/2017    Procedure: Coronary angiography;  Surgeon: Marjorie Healy MD;  Location: [facility] CATH INVASIVE LOCATION;  Service:     CARDIAC CATHETERIZATION N/A 04/10/2017    Procedure: Left ventriculography;  Surgeon: Marjorie Healy MD;  Location: [facility] CATH INVASIVE LOCATION;  Service:     CARDIAC CATHETERIZATION  2011    CARDIAC ELECTROPHYSIOLOGY PROCEDURE N/A 04/18/2017    Procedure: Ablation atrial flutter;  Surgeon: Jose Antonio Gustafson MD;  Location: [facility] CATH INVASIVE LOCATION;  Service:     CAROTID ENDARTERECTOMY      CHOLECYSTECTOMY      CHOLECYSTECTOMY WITH INTRAOPERATIVE CHOLANGIOGRAM N/A 09/07/2019    Procedure: Laparoscopic cholecystectomy with intraoperative cholangiogram;  Surgeon: Gauri Travis MD;  Location: [facility] MAIN OR;  Service: General    COLONOSCOPY  01/06/2015    Diverticulosis, one TA    COLONOSCOPY N/A 02/14/2019    tics, NBIH, adenomatous polyp x 2    COLONOSCOPY N/A 11/05/2021    Procedure: COLONOSCOPY TO CECUM AND TERM. ILEUM WITH COLD POLYPECTOMIES;  Surgeon: Everton Abel MD;  Location: [facility] ENDOSCOPY;  Service: Gastroenterology;  Laterality: N/A;  PRE OP - PERS H/O POLYPS  POST OP - COLON POLYPS,, DIVERTICULOSIS, HEMORRHOIDS    CORONARY ARTERY BYPASS GRAFT N/A 04/11/2017    Procedure: AR STERNOTOMY CORONARY ARTERY BYPASS GRAFT TIMES 3 USING LEFT INTERNAL MAMMARY ARTERY AND LEFT GREATER SAPHENOUS VEIN GRAFT PER ENDOSCOPIC VEIN HARVESTING AND PRP ;  Surgeon: Temo Cortez MD;  Location: [facility] OR;  Service:     ENDOSCOPY  01/06/2015    HH, Mandel's esophagus    ENDOSCOPY N/A 02/14/2019    Z line irregular, HH, Mandel's esophagus    ENDOSCOPY N/A 11/05/2021    Procedure: ESOPHAGOGASTRODUODENOSCOPY WITH BIOPSIES;  Surgeon: Everton Abel MD;  Location:   [facility];  Service: Gastroenterology;  Laterality: N/A;  PRE OP - PERS H/O ERVIN'S  POST OP - IRREG Z LINE    KNEE SURGERY Left     URETEROSCOPY LASER LITHOTRIPSY WITH STENT INSERTION Left 2022    Procedure: Cysto retrograde with left uretro stent placement;  Surgeon: Damien Oliveira MD;  Location[facility] MAIN OR;  Service: Urology;  Laterality: Left;    VASECTOMY           FAMILY HISTORY  Family History   Problem Relation Age of Onset    Hypertension Mother     Heart disease Mother     Heart attack Mother     Stroke Mother     Heart disease Father     Heart attack Father     Stroke Father     Hypertension Sister     Heart attack Brother     Heart disease Brother     No Known Problems Brother     Heart disease Brother     Heart attack Brother     Diabetes Brother     No Known Problems Maternal Grandmother     No Known Problems Maternal Grandfather     No Known Problems Paternal Grandmother     No Known Problems Paternal Grandfather     Pancreatic cancer Paternal Cousin     Arthritis Other     Diabetes Other     Hypertension Other     Kidney disease Other         stones    Malig Hyperthermia Neg Hx          SOCIAL HISTORY  Social History     Socioeconomic History    Marital status:      Spouse name: Brooke    Years of education: 9th grade   Tobacco Use    Smoking status: Former     Packs/day: 1     Types: Cigarettes     Start date: 1959     Quit date: 2009     Years since quittin.8    Smokeless tobacco: Never    Tobacco comments:     CAFFEINE USE   Vaping Use    Vaping Use: Never used   Substance and Sexual Activity    Alcohol use: Not Currently     Comment: rarely    Drug use: No    Sexual activity: Defer     Partners: Female         ALLERGIES  Atorvastatin and Penicillins        REVIEW OF SYSTEMS  Review of Systems         PHYSICAL EXAM  ED Triage Vitals   Temp Heart Rate Resp BP SpO2   10/23/23 0536 10/23/23 0415 10/23/23 0415 10/23/23 0415 10/23/23 0415   (!) 100.7 °F (38.2  °C) 103 18 127/63 97 %      Temp  Heart Rate Source Patient Position BP Location FiO2 (%)   10/23/23 0536 -- -- -- --   Tympanic           Physical Exam      GENERAL: no acute distress  HENT: normocephalic, there is an abrasion to the of the left ear.  No other signs of head trauma.  No hematomas, no hemotympanum mancilla sign raccoon eyes septal hematoma otorrhea or rhinorrhea  EYES: no scleral icterus  CV: regular rhythm, normal rate  RESPIRATORY: normal effort, CTA B  ABDOMEN: nondistended soft nontender normal bowel sounds no guarding or rigidity  MUSCULOSKELETAL: no deformity, very small abrasion to left elbow, no C, T, L-spine tenderness.  Extremities are atraumatic and nontender with age-appropriate range of motion.  NEURO: alert, moves all extremities, follows commands  PSYCH:  calm, cooperative  SKIN: warm, dry    Vital signs and nursing notes reviewed.          LAB RESULTS  Labs Reviewed   RESPIRATORY PANEL PCR W/ COVID-19 (SARS-COV-2), NP SWAB IN UTM/VTP, 3-4 HR TAT - Abnormal; Notable for the following components:       Result Value    COVID19 Detected (*)     All other components within normal limits    Narrative:     In the setting of a positive respiratory panel with a viral infection PLUS a negative procalcitonin without other underlying concern for bacterial infection, consider observing off antibiotics or discontinuation of antibiotics and continue supportive care. If the respiratory panel is positive for atypical bacterial infection (Bordetella pertussis, Chlamydophila pneumoniae, or Mycoplasma pneumoniae), consider antibiotic de-escalation to target atypical bacterial infection.   COMPREHENSIVE METABOLIC PANEL - Abnormal; Notable for the following components:    Glucose 138 (*)     AST (SGOT) 49 (*)     Alkaline Phosphatase 191 (*)     All other components within normal limits    Narrative:     GFR Normal >60  Chronic Kidney Disease <60  Kidney Failure <15    The GFR formula is only valid for  adults with stable renal function between ages 18 and 70.   URINALYSIS W/ MICROSCOPIC IF INDICATED (NO CULTURE) - Abnormal; Notable for the following components:    Blood, UA Small (1+) (*)     Protein, UA Trace (*)     All other components within normal limits   TROPONIN - Abnormal; Notable for the following components:    HS Troponin T 25 (*)     All other components within normal limits    Narrative:     High Sensitive Troponin T Reference Range:  <10.0 ng/L- Negative Female for AMI  <15.0 ng/L- Negative Male for AMI  >=10 - Abnormal Female indicating possible myocardial injury.  >=15 - Abnormal Male indicating possible myocardial injury.   Clinicians would have to utilize clinical acumen, EKG, Troponin, and serial changes to determine if it is an Acute Myocardial Infarction or myocardial injury due to an underlying chronic condition.        CBC WITH AUTO DIFFERENTIAL - Abnormal; Notable for the following components:    RBC 3.41 (*)     .1 (*)     MCH 38.4 (*)     RDW-SD 55.1 (*)     All other components within normal limits   MANUAL DIFFERENTIAL - Abnormal; Notable for the following components:    Neutrophil % 88.9 (*)     Lymphocyte % 0.0 (*)     Neutrophils Absolute 7.47 (*)     Lymphocytes Absolute 0.00 (*)     Monocytes Absolute 0.93 (*)     All other components within normal limits   HIGH SENSITIVITIY TROPONIN T 2HR - Abnormal; Notable for the following components:    HS Troponin T 29 (*)     Troponin T Delta 4 (*)     All other components within normal limits    Narrative:     High Sensitive Troponin T Reference Range:  <10.0 ng/L- Negative Female for AMI  <15.0 ng/L- Negative Male for AMI  >=10 - Abnormal Female indicating possible myocardial injury.  >=15 - Abnormal Male indicating possible myocardial injury.   Clinicians would have to utilize clinical acumen, EKG, Troponin, and serial changes to determine if it is an Acute Myocardial Infarction or myocardial injury due to an underlying chronic  condition.        CBC WITH AUTO DIFFERENTIAL - Abnormal; Notable for the following components:    RBC 3.07 (*)     Hemoglobin 11.9 (*)     Hematocrit 34.6 (*)     .7 (*)     MCH 38.8 (*)     All other components within normal limits   PHOSPHORUS - Abnormal; Notable for the following components:    Phosphorus 2.1 (*)     All other components within normal limits   COMPREHENSIVE METABOLIC PANEL - Abnormal; Notable for the following components:    Sodium 132 (*)     CO2 21.5 (*)     Calcium 8.3 (*)     ALT (SGPT) 55 (*)     AST (SGOT) 97 (*)     Alkaline Phosphatase 204 (*)     All other components within normal limits    Narrative:     GFR Normal >60  Chronic Kidney Disease <60  Kidney Failure <15    The GFR formula is only valid for adults with stable renal function between ages 18 and 70.   MANUAL DIFFERENTIAL - Abnormal; Notable for the following components:    Lymphocyte % 10.2 (*)     Monocyte % 16.3 (*)     Lymphocytes Absolute 0.51 (*)     All other components within normal limits   CBC WITH AUTO DIFFERENTIAL - Abnormal; Notable for the following components:    RBC 3.47 (*)     .5 (*)     MCH 38.0 (*)     Platelets 133 (*)     All other components within normal limits   PHOSPHORUS - Abnormal; Notable for the following components:    Phosphorus 2.1 (*)     All other components within normal limits   COMPREHENSIVE METABOLIC PANEL - Abnormal; Notable for the following components:    Glucose 124 (*)     Sodium 130 (*)     Chloride 97 (*)     CO2 18.3 (*)     Calcium 8.4 (*)     ALT (SGPT) 86 (*)     AST (SGOT) 194 (*)     Alkaline Phosphatase 230 (*)     All other components within normal limits    Narrative:     GFR Normal >60  Chronic Kidney Disease <60  Kidney Failure <15    The GFR formula is only valid for adults with stable renal function between ages 18 and 70.   LACTIC ACID, PLASMA - Abnormal; Notable for the following components:    Lactate 3.4 (*)     All other components within normal  "limits   BLOOD GAS, ARTERIAL - Abnormal; Notable for the following components:    pH, Arterial 7.523 (*)     pCO2, Arterial 26.0 (*)     pO2, Arterial 61.0 (*)     HCO3, Arterial 21.4 (*)     CO2 Content 22.2 (*)     All other components within normal limits   MANUAL DIFFERENTIAL - Abnormal; Notable for the following components:    Neutrophil % 91.9 (*)     Lymphocyte % 5.1 (*)     Monocyte % 3.0 (*)     Lymphocytes Absolute 0.20 (*)     All other components within normal limits   LACTIC ACID, REFLEX - Abnormal; Notable for the following components:    Lactate 4.5 (*)     All other components within normal limits   PROCALCITONIN - Abnormal; Notable for the following components:    Procalcitonin 3.88 (*)     All other components within normal limits    Narrative:     As a Marker for Sepsis (Non-Neonates):    1. <0.5 ng/mL represents a low risk of severe sepsis and/or septic shock.  2. >2 ng/mL represents a high risk of severe sepsis and/or septic shock.    As a Marker for Lower Respiratory Tract Infections that require antibiotic therapy:    PCT on Admission    Antibiotic Therapy       6-12 Hrs later    >0.5                Strongly Recommended  >0.25 - <0.5        Recommended   0.1 - 0.25          Discouraged              Remeasure/reassess PCT  <0.1                Strongly Discouraged     Remeasure/reassess PCT    As 28 day mortality risk marker: \"Change in Procalcitonin Result\" (>80% or <=80%) if Day 0 (or Day 1) and Day 4 values are available. Refer to http://[email or web address]-pct-[email or web address]    Change in PCT <=80%  A decrease of PCT levels below or equal to 80% defines a positive change in PCT test result representing a higher risk for 28-day all-cause mortality of patients diagnosed with severe sepsis for septic shock.    Change in PCT >80%  A decrease of PCT levels of more than 80% defines a negative change in PCT result representing a lower risk for 28-day all-cause mortality of patients diagnosed with severe " sepsis or septic shock.      LACTIC ACID, REFLEX - Abnormal; Notable for the following components:    Lactate 4.4 (*)     All other components within normal limits   PHOSPHORUS - Abnormal; Notable for the following components:    Phosphorus 1.5 (*)     All other components within normal limits   POCT GLUCOSE FINGERSTICK - Abnormal; Notable for the following components:    Glucose 173 (*)     All other components within normal limits   MRSA SCREEN, PCR - Normal    Narrative:     The negative predictive value of this diagnostic test is high and should only be used to consider de-escalating anti-MRSA therapy. A positive result may indicate colonization with MRSA and must be correlated clinically.   LACTIC ACID, PLASMA - Normal   CK - Normal   MAGNESIUM - Normal   MAGNESIUM - Normal   LACTIC ACID, REFLEX - Normal   POCT GLUCOSE FINGERSTICK - Normal   BLOOD CULTURE   BLOOD CULTURE   URINALYSIS, MICROSCOPIC ONLY   BLOOD GAS, ARTERIAL   CBC AND DIFFERENTIAL    Narrative:     The following orders were created for panel order CBC & Differential.  Procedure                               Abnormality         Status                     ---------                               -----------         ------                     CBC Auto Differential[[id number]]        Abnormal            Final result                 Please view results for these tests on the individual orders.       Ordered the above labs and reviewed the results.        RADIOLOGY  XR Chest 1 View    Result Date: 10/25/2023  Narrative: Portable chest x-ray  HISTORY: Increased oxygen need.  TECHNIQUE: Portable chest x-ray is provided and correlated with x-ray October 23, 2023. This image was acquired and an exaggerated reverse lordotic configuration which partly obscures visualization of the lung bases.  FINDINGS: Sternal wires with new asymmetric density in the left mid and lower lung zones. The visualized right lung appears clear. Vascular volume is normal.       Impression: New opacity in the left lung is asymmetric and favored represent pneumonia.  This report was finalized on 10/25/2023 8:13 AM by Dr. Jose Antonio Ortega M.D on Workstation: [id number]      CT Head Without Contrast    Result Date: 10/24/2023  Narrative: EMERGENCY CT SCAN OF THE HEAD WITHOUT CONTRAST ON 10/23/2023  CLINICAL HISTORY: Patient fell, left-sided head trauma, headache, patient is on anticoagulation.  TECHNIQUE: Spiral CT images were obtained from the base of the skull to the vertex without intravenous contrast. The images were reformatted and submitted in 3 mm thick axial, sagittal and coronal CT sections with brain algorithm and 2 mm thick axial CT sections with high-resolution bone algorithm.  This is correlated to prior head CT without contrast from [facility] on 02/19/2023 and a prior MRI of the brain on 03/16/2015.  FINDINGS: There is some patchy low-density extending from the periventricular into the subcortical white matter of the cerebral hemispheres consistent with mild-to-moderate small vessel disease. There is a small 3-4 mm old lacunar infarct in the inferior lateral right thalamus. There are tiny old lacunar infarcts in the anterior and mid left corona radiata region. There is a focal area of encephalomalacia involving the posterior superior left frontal lobe measuring 14 x 10 mm in size compatible with an old posterior superior left frontal infarct in the left MCA territory and this is unchanged dating back to an MRI of the brain on 03/16/2015. The remainder of the brain parenchyma is normal in attenuation. The ventricles are normal in size. I see no focal mass effect. There is no midline shift. No extra axial fluid collections are identified. There is no evidence of acute intracranial hemorrhage. No acute skull fracture is identified. The calvarium and skull base are normal in appearance. There is a 3 mm calcific density in the subcutaneous fat of the scalp overlying  the anterior inferior left frontal bone just above the left orbit, unchanged. The paranasal sinuses and the mastoid air cells and middle ear cavities are clear.      Impression: 1. No acute intracranial abnormality is identified. 2. There is mild-to-moderate small vessel disease in the cerebral white matter. 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 3. The remainder of the head CT is within normal limits. Specifically, no acute skull fracture or intracranial hemorrhage is identified.  Radiation dose reduction techniques were utilized, including automated exposure control and exposure modulation based on body size.   This report was finalized on 10/24/2023 8:07 AM by Dr. Christiano Leon M.D on Workstation: [id number]      XR Chest 1 View    Result Date: 10/23/2023  Narrative: EXAM: XR CHEST 1 VW-  INDICATION: Weakness  COMPARISON: Radiographs dating back to 3/10/2015       Impression: No focal consolidation. No pleural effusion or pneumothorax.  Normal size cardiomediastinal silhouette. Nondisplaced median sternotomy wires.   This report was finalized on 10/23/2023 7:51 AM by Dr. Alexander Causey M.D on Workstation: [id number]         Ordered the above noted radiological studies. Reviewed by me in PACS.            PROCEDURES  Procedures              MEDICATIONS GIVEN IN ER  Medications   sodium chloride 0.9 % flush 10 mL (has no administration in time range)   sodium chloride 0.9 % flush 10 mL (10 mL Intravenous Given 10/25/23 1048)   sodium chloride 0.9 % flush 10 mL (has no administration in time range)   sodium chloride 0.9 % infusion 40 mL (has no administration in time range)   acetaminophen (TYLENOL) tablet 650 mg (650 mg Oral Given 10/25/23 0227)     Or   acetaminophen (TYLENOL) 160 MG/5ML oral  solution 650 mg ( Oral Not Given:  See Alt 10/25/23 0227)     Or   acetaminophen (TYLENOL) suppository 650 mg ( Rectal Not Given:  See Alt 10/25/23 0227)   sennosides-docusate (PERICOLACE) 8.6-50 MG per tablet 2 tablet (has no administration in time range)     And   polyethylene glycol (MIRALAX) packet 17 g (has no administration in time range)     And   bisacodyl (DULCOLAX) EC tablet 5 mg (has no administration in time range)     And   bisacodyl (DULCOLAX) suppository 10 mg (has no administration in time range)   ondansetron (ZOFRAN) tablet 4 mg (has no administration in time range)     Or   ondansetron (ZOFRAN) injection 4 mg (has no administration in time range)   melatonin tablet 5 mg (has no administration in time range)   allopurinol (ZYLOPRIM) tablet 300 mg (300 mg Oral Not Given 10/25/23 0856)   amitriptyline (ELAVIL) tablet 50 mg (50 mg Oral Given 10/24/23 2154)   amLODIPine (NORVASC) tablet 2.5 mg (2.5 mg Oral Not Given 10/25/23 0856)   apixaban (ELIQUIS) tablet 5 mg (5 mg Oral Not Given 10/25/23 0856)   HYDROcodone-acetaminophen (NORCO)  MG per tablet 1 tablet (1 tablet Oral Given 10/24/23 1547)   hydroxyurea (HYDREA) capsule 500 mg (500 mg Oral Not Given 10/25/23 0912)   losartan (COZAAR) tablet 50 mg (50 mg Oral Not Given 10/25/23 0856)   oxybutynin XL (DITROPAN-XL) 24 hr tablet 5 mg (5 mg Oral Not Given 10/25/23 0856)   pantoprazole (PROTONIX) EC tablet 40 mg (40 mg Oral Not Given 10/25/23 0539)   rosuvastatin (CRESTOR) tablet 20 mg (20 mg Oral Not Given 10/25/23 0856)   remdesivir 200 mg in sodium chloride 0.9 % 290 mL IVPB (powder vial) (200 mg Intravenous New Bag 10/23/23 1638)     Followed by   remdesivir 100 mg in sodium chloride 0.9 % 250 mL IVPB (powder vial) (100 mg Intravenous New Bag 10/25/23 0034)   guaiFENesin (MUCINEX) 12 hr tablet 600 mg (600 mg Oral Not Given 10/25/23 6635)   HYDROcodone Bit-Homatrop MBr (HYCODAN) 5-1.5 MG/5ML solution 5 mL (has no administration in time range)    benzocaine-menthol (CHLORASEPTIC) lozenge 1 lozenge (has no administration in time range)   cefepime 2 gm IVPB in 100 ml NS (VTB) (has no administration in time range)   Pharmacy to dose vancomycin ( Does not apply Canceled Entry 10/25/23 1400)   vancomycin IVPB 1500 mg in 0.9% NaCl (Premix) 500 mL (1,500 mg Intravenous New Bag 10/25/23 1048)   dexAMETHasone (DECADRON) injection 6 mg (6 mg Intravenous Given 10/25/23 0929)   sodium chloride 0.9 % infusion (100 mL/hr Intravenous Currently Infusing 10/25/23 1400)   potassium phosphate 15 mmol in 0.9% normal saline 250 mL IVPB (15 mmol Intravenous New Bag 10/25/23 1530)   aluminum-magnesium hydroxide-simethicone (MAALOX MAX) 400-400-40 MG/5ML suspension 10 mL (10 mL Oral Given 10/23/23 0555)   acetaminophen (TYLENOL) tablet 1,000 mg (1,000 mg Oral Given 10/23/23 0635)   lactated ringers bolus 500 mL (0 mL Intravenous Stopped 10/23/23 0856)   lactated ringers bolus 500 mL (0 mL Intravenous Stopped 10/23/23 1115)   sodium chloride 0.9 % bolus 500 mL (500 mL Intravenous New Bag 10/25/23 0602)   cefepime 2 gm IVPB in 100 ml NS (VTB) (2,000 mg Intravenous New Bag 10/25/23 0857)   sodium chloride 0.9 % bolus 2,000 mL (2,000 mL Intravenous New Bag 10/25/23 1223)                   MEDICAL DECISION MAKING, PROGRESS, and CONSULTS    All labs have been independently reviewed by me.  All radiology studies have been reviewed by me and I have also reviewed the radiology report.   EKG's independently viewed and interpreted by me.  Discussion below represents my analysis of pertinent findings related to patient's condition, differential diagnosis, treatment plan and final disposition.            Orders placed during this visit:  Orders Placed This Encounter   Procedures    Respiratory Panel PCR w/COVID-19(SARS-CoV-2) [facility]RELL/[facility]STER/KANCHAN/[facility]AN In-House, NP Swab in UTM/VTM, 3-4 HR TAT - Swab, Nasopharynx    Blood Culture - Blood,    Blood Culture - Blood,    MRSA Screen, PCR  (Inpatient) - Swab, Nares    XR Chest 1 View    CT Head Without Contrast    XR Chest 1 View    Comprehensive Metabolic Panel    Urinalysis With Microscopic If Indicated (No Culture) - Urine, Clean Catch    High Sensitivity Troponin T    Lactic Acid, Plasma    CBC Auto Differential    Manual Differential    High Sensitivity Troponin T 2Hr    CK    Urinalysis, Microscopic Only - Urine, Clean Catch    CBC Auto Differential    Magnesium    Phosphorus    Comprehensive Metabolic Panel    Manual Differential    Blood Gas, Arterial -    Lactic Acid, Plasma    Blood Gas, Arterial -    Manual Differential    STAT Lactic Acid, Reflex    Procalcitonin    Vancomycin, Trough    STAT Lactic Acid, Reflex    STAT Lactic Acid, Reflex    Phosphorus    Diet: Cardiac Diets; Healthy Heart (2-3 Na+); Texture: Regular Texture (IDDSI 7); Fluid Consistency: Thin (IDDSI 0)    Bladder scan    Vital Signs    Intake & Output    Weigh Patient    Oral Care    Saline Lock & Maintain IV Access    Place Sequential Compression Device    Maintain Sequential Compression Device    Code Status and Medical Interventions:    MAGALI (on-call MD unless specified) Details    Inpatient Pulmonology Consult    OT Consult: Eval & Treat    OT Plan of Care Cert / Re-Cert    PT Consult: Eval & Treat Discharge Placement Assessment    PT Plan of Care Cert / Re-Cert    Oxygen Therapy- Heated High Flow Nasal Cannula; Titrate 30-60 LPM Per SpO2; 90 - 95%    POC Glucose Once    POC Glucose Once    ECG 12 Lead Other; weakness    SCANNED - TELEMETRY      SCANNED - TELEMETRY      SCANNED - TELEMETRY      SCANNED - TELEMETRY      SCANNED - TELEMETRY      SCANNED - TELEMETRY      SCANNED - TELEMETRY      SCANNED - TELEMETRY      SCANNED - TELEMETRY      SCANNED - TELEMETRY      SCANNED - TELEMETRY      Insert Peripheral IV    Insert Peripheral IV    Initiate Observation Status    Inpatient Admission    Transfer Patient    CBC & Differential           Differential  diagnosis:  Pneumonia, UTI, viral URI, bacteremia, viral syndrome, anemia, dehydration      Independent interpretation of labs, radiology studies, and discussions with consultants:  ED Course as of 10/25/23 1616   Mon Oct 23, 2023   0610 WBC: 8.40 [KA]   0610 Hemoglobin: 13.1 [KA]   0610 Lactate: 1.7 [KA]   0750 Glucose(!): 138 [KA]   0751 Creatinine: 1.00 [KA]   0751 Alkaline Phosphatase(!): 191 [KA]   0751 AST (SGOT)(!): 49  LFTs chronically elevated [KA]   0825 COVID19(!!): Detected  I discussed patient with Dr. Leon, radiologist.  CT head shows no acute intracranial findings. [KA]   0914 I reassessed the patient, counseled him about all of his lab and imaging results.  Second 500 cc bolus of fluids ordered, patient tried a few minutes to go to give us a urine specimen and was unable to, will continue to try.  Based on his generalized weakness, abnormal troponin, recommended hospitalization for further evaluation and treatment, he is agreeable. [KA]   0956 I discussed the patient with Dr. Rios, hospitalist for A including history presentation labs and imaging.  Urinalysis pending.  She agrees to admit for further evaluation and treatment. [KA]      ED Course User Index  [KA] Licha Cutler PA       - Shared decision making:  recommended admission, patient agreeable    Additional orders considered but not ordered:  Considered abx, not indicated at this time      Additional sources:    - Chronic or social conditions impacting care: lives at home          DIAGNOSIS  Final diagnoses:   COVID-19   Generalized weakness   Fall at home, initial encounter   Abrasion of left ear, initial encounter   Elevated troponin                     --    Please note that portions of this were completed with a voice recognition program.       Note Disclaimer: At [facility], we believe that sharing information builds trust and better relationships. You are receiving this note because you are receiving care at [facility]  or recently visited. It is possible you will see health information before a provider has talked with you about it. This kind of information can be easy to misunderstand. To help you fully understand what it means for your health, we urge you to discuss this note with your provider.             Licha Cutler PA  10/25/23 7174

## 2023-10-23 NOTE — H&P
Patient Name:  Madhu Santoro  YOB: 1945  MRN:  [id number]  Admit Date:  10/23/2023  Patient Care Team:  Damian Sanchez MD as PCP - General (Family Medicine)  Jr Temo Cortez MD as Surgeon (Cardiothoracic Surgery)  Netta Mayorga Jr., MD as Consulting Physician (Vascular Surgery)  Damian Sanchez MD as Referring Physician (Family Medicine)  Aquiles Lopez MD as Consulting Physician (Hematology and Oncology)  Christy Luther APRN as Nurse Practitioner (Nurse Practitioner)  Damian Sanchez MD as Referring Physician (Family Medicine)      Subjective   History Present Illness     Chief Complaint   Patient presents with    Fall       Mr. Santoro is a 77 y.o. former smoker with a history of HTN, a flutter, CVA, HLD, CAD w/ h/o CABG, polycythemia vera, gout, chronic pain/opioid dependence that presents to [facility] complaining of generalized weakness and mechanical fall.     History of Present Illness  77-year-old gentleman with history as above who presents with worsening weakness over the last 7 days.  Patient states he was in his usual state of health until about a week ago when he began to feel poorly.  His weakness has progressed to the point where he attempted to stand up and ended up falling/sliding out of bed.  He states that he does not think he could get out of bed right now if he tried.  He reports dry cough but denies shortness of breath, chest pain, nausea, vomiting, diarrhea.  He reports decreased appetite.    Review of Systems   Constitutional:  Positive for fatigue. Negative for chills and fever.   Respiratory:  Positive for cough. Negative for shortness of breath and wheezing.    Cardiovascular:  Negative for chest pain and leg swelling.   Gastrointestinal:  Negative for nausea and vomiting.   Genitourinary:  Negative for dysuria, frequency, hematuria and urgency.   Musculoskeletal:  Positive for myalgias. Negative for arthralgias and back pain.  "  Skin:  Negative for rash and wound.   Neurological:  Positive for weakness. Negative for seizures and syncope.        Personal History     Past Medical History:   Diagnosis Date    Anxiety     Arthritis     Atrial flutter     Status post cavotricuspid isthmus ablation by Dr. Gustafson on 4/18/17    Mandel esophagus     Benign essential hypertension     CAD (coronary artery disease)     3 vessel CABG 4/11/17 by Dr. Cortez: ROSARIO-prox LAD, SVG-OM1, SVG-OM3    Carotid artery disease     Status post carotid endarterectomy - USG 4/10/17: 50-59% NICHELLE, 1-15% LICA.     Colonic polyp     Cyst of pancreas     DDD (degenerative disc disease), lumbosacral     GERD (gastroesophageal reflux disease)     H/O bone density study 2013    H/O complete eye exam 2014    History of kidney stone     HLD (hyperlipidemia)     Hypertension     Kidney stone     8/22/22    Lipid screening 05/31/2013    Low back pain     [facility]    Screening for prostate cancer 07/07/2015    Skin cancer     nose    Stroke     RESIDUAL--\"BALANCE ISSUES\"     Past Surgical History:   Procedure Laterality Date    CARDIAC CATHETERIZATION N/A 04/10/2017    Procedure: Left Heart Cath;  Surgeon: Marjorie Healy MD;  Location:  [facility];  Service:     CARDIAC CATHETERIZATION N/A 04/10/2017    Procedure: Coronary angiography;  Surgeon: Marjorie Healy MD;  Location:  [facility];  Service:     CARDIAC CATHETERIZATION N/A 04/10/2017    Procedure: Left ventriculography;  Surgeon: Marjorie Healy MD;  Location:  [facility];  Service:     CARDIAC CATHETERIZATION  2011    CARDIAC ELECTROPHYSIOLOGY PROCEDURE N/A 04/18/2017    Procedure: Ablation atrial flutter;  Surgeon: Jose Antonio Gustafson MD;  Location:  [facility];  Service:     CAROTID ENDARTERECTOMY      CHOLECYSTECTOMY      CHOLECYSTECTOMY WITH INTRAOPERATIVE CHOLANGIOGRAM N/A 09/07/2019    Procedure: Laparoscopic " cholecystectomy with intraoperative cholangiogram;  Surgeon: Gauri Travis MD;  Location: [facility] MAIN OR;  Service: General    COLONOSCOPY  01/06/2015    Diverticulosis, one TA    COLONOSCOPY N/A 02/14/2019    tics, NBIH, adenomatous polyp x 2    COLONOSCOPY N/A 11/05/2021    Procedure: COLONOSCOPY TO CECUM AND TERM. ILEUM WITH COLD POLYPECTOMIES;  Surgeon: Everton Abel MD;  [facility];  Service: Gastroenterology;  Laterality: N/A;  PRE OP - PERS H/O POLYPS  POST OP - COLON POLYPS,, DIVERTICULOSIS, HEMORRHOIDS    CORONARY ARTERY BYPASS GRAFT N/A 04/11/2017    Procedure: AR STERNOTOMY CORONARY ARTERY BYPASS GRAFT TIMES 3 USING LEFT INTERNAL MAMMARY ARTERY AND LEFT GREATER SAPHENOUS VEIN GRAFT PER ENDOSCOPIC VEIN HARVESTING AND PRP ;  Surgeon: Temo Cortez MD;  Location: [facility] MAIN OR;  Service:     ENDOSCOPY  01/06/2015    HH, Ervin's esophagus    ENDOSCOPY N/A 02/14/2019    Z line irregular, HH, Ervin's esophagus    ENDOSCOPY N/A 11/05/2021    Procedure: ESOPHAGOGASTRODUODENOSCOPY WITH BIOPSIES;  Surgeon: Everton Abel MD;  Location: [facility] ENDOSCOPY;  Service: Gastroenterology;  Laterality: N/A;  PRE OP - PERS H/O ERVIN'S  POST OP - IRREG Z LINE    KNEE SURGERY Left     URETEROSCOPY LASER LITHOTRIPSY WITH STENT INSERTION Left 8/23/2022    Procedure: Cysto retrograde with left uretro stent placement;  Surgeon: Damien Oliveira MD;  Location: [facility] MAIN OR;  Service: Urology;  Laterality: Left;    VASECTOMY       Family History   Problem Relation Age of Onset    Hypertension Mother     Heart disease Mother     Heart attack Mother     Stroke Mother     Heart disease Father     Heart attack Father     Stroke Father     Hypertension Sister     Heart attack Brother     Heart disease Brother     No Known Problems Brother     Heart disease Brother     Heart attack Brother     Diabetes Brother     No Known Problems Maternal Grandmother     No Known Problems Maternal  Grandfather     No Known Problems Paternal Grandmother     No Known Problems Paternal Grandfather     Pancreatic cancer Paternal Cousin     Arthritis Other     Diabetes Other     Hypertension Other     Kidney disease Other         stones    Malig Hyperthermia Neg Hx      Social History     Tobacco Use    Smoking status: Former     Packs/day: 1     Types: Cigarettes     Start date: 1959     Quit date: 2009     Years since quittin.8    Smokeless tobacco: Never    Tobacco comments:     CAFFEINE USE   Vaping Use    Vaping Use: Never used   Substance Use Topics    Alcohol use: Not Currently     Comment: rarely    Drug use: No     No current facility-administered medications on file prior to encounter.     Current Outpatient Medications on File Prior to Encounter   Medication Sig Dispense Refill    allopurinol (Zyloprim) 300 MG tablet Take 1 tablet by mouth Daily. 90 tablet 1    amitriptyline (ELAVIL) 50 MG tablet TAKE ONE TABLET BY MOUTH ONCE NIGHTLY 30 tablet 3    amLODIPine (NORVASC) 2.5 MG tablet Take 1 tablet by mouth Daily. 90 tablet 3    apixaban (ELIQUIS) 5 MG tablet tablet Take 1 tablet by mouth 2 (Two) Times a Day. 90 tablet 3    HYDROcodone-acetaminophen (NORCO)  MG per tablet Take 1 tablet by mouth Every 8 (Eight) Hours As Needed for Severe Pain. 30 day supply 75 tablet 0    hydroxyurea (HYDREA) 500 MG capsule Take 1 capsule by mouth Every Other Day. 15 capsule 5    losartan (Cozaar) 50 MG tablet Take 1 tablet by mouth Daily. 90 tablet 1    Myrbetriq 25 MG tablet sustained-release 24 hour 24 hr tablet Take 1 tablet by mouth Daily.      potassium chloride 10 MEQ CR tablet TAKE ONE TABLET BY MOUTH DAILY 7 tablet 1    RABEprazole (Aciphex) 20 MG EC tablet Take 1 tablet by mouth Daily. 90 tablet 1    rosuvastatin (Crestor) 20 MG tablet Take 1 tablet by mouth Daily. 90 tablet 1    furosemide (LASIX) 20 MG tablet TAKE ONE TABLET BY MOUTH DAILY 7 tablet 1    HYDROcodone-acetaminophen (NORCO) 5-325  MG per tablet Take 2 tablets by mouth Every 8 (Eight) Hours As Needed for Severe Pain. 30 day supply. DNF 10/19/23 150 tablet 0    Ibuprofen 3 %, Gabapentin 10 %, Baclofen 2 %, lidocaine 4 %, Ketamine HCl 4 % Apply 1-2 g topically to the appropriate area as directed 3 (Three) to 4 (Four) times daily. 90 g 0    mupirocin (BACTROBAN) 2 % cream Apply 1 application topically to the appropriate area as directed 2 (Two) Times a Day. 15 g 0     Allergies   Allergen Reactions    Atorvastatin Myalgia     Myalgia    Penicillins Hives, Swelling and Rash     Tolerates cephalosporins        Objective    Objective     Vital Signs  Temp:  [97.9 °F (36.6 °C)-100.7 °F (38.2 °C)] 97.9 °F (36.6 °C)  Heart Rate:  [] 82  Resp:  [16-18] 18  BP: (119-175)/() 119/65  SpO2:  [94 %-99 %] 99 %  on   ;   Device (Oxygen Therapy): room air  Body mass index is 26.43 kg/m².    Physical Exam  Constitutional:       General: He is not in acute distress.     Appearance: He is ill-appearing. He is not toxic-appearing.   Cardiovascular:      Rate and Rhythm: Normal rate and regular rhythm.   Pulmonary:      Effort: Pulmonary effort is normal. No respiratory distress.      Breath sounds: Normal breath sounds. No wheezing.   Abdominal:      General: Abdomen is flat. There is no distension.      Palpations: Abdomen is soft.      Tenderness: There is no abdominal tenderness.   Musculoskeletal:         General: No tenderness.      Right lower leg: No edema.      Left lower leg: No edema.   Skin:     General: Skin is warm and dry.   Neurological:      General: No focal deficit present.      Mental Status: He is alert and oriented to person, place, and time. Mental status is at baseline.      Motor: Weakness (generalized) present.       Results Review:  I reviewed the patient's new clinical results.  I reviewed the patient's new imaging results and agree with the interpretation.  I reviewed the patient's other test results and agree with the  interpretation  I personally viewed and interpreted the patient's EKG/Telemetry data  Discussed with ED provider.    Lab Results (last 24 hours)       Procedure Component Value Units Date/Time    CBC & Differential [[id number]]  (Abnormal) Collected: 10/23/23 0507    Specimen: Blood Updated: 10/23/23 0518    Narrative:      The following orders were created for panel order CBC & Differential.  Procedure                               Abnormality         Status                     ---------                               -----------         ------                     CBC Auto Differential[[id number]]        Abnormal            Final result                 Please view results for these tests on the individual orders.    Lactic Acid, Plasma [[id number]]  (Normal) Collected: 10/23/23 0507    Specimen: Blood Updated: 10/23/23 0533     Lactate 1.7 mmol/L     CBC Auto Differential [[id number]]  (Abnormal) Collected: 10/23/23 0507    Specimen: Blood Updated: 10/23/23 0518     WBC 8.40 10*3/mm3      RBC 3.41 10*6/mm3      Hemoglobin 13.1 g/dL      Hematocrit 38.9 %      .1 fL      MCH 38.4 pg      MCHC 33.7 g/dL      RDW 12.9 %      RDW-SD 55.1 fl      MPV 10.3 fL      Platelets 208 10*3/mm3      nRBC 0.0 /100 WBC     Manual Differential [[id number]]  (Abnormal) Collected: 10/23/23 0507    Specimen: Blood Updated: 10/23/23 0600     Neutrophil % 88.9 %      Lymphocyte % 0.0 %      Monocyte % 11.1 %      Neutrophils Absolute 7.47 10*3/mm3      Lymphocytes Absolute 0.00 10*3/mm3      Monocytes Absolute 0.93 10*3/mm3      Anisocytosis Slight/1+     Macrocytes Slight/1+     WBC Morphology Normal     Platelet Morphology Normal    Comprehensive Metabolic Panel [[id number]]  (Abnormal) Collected: 10/23/23 0548    Specimen: Blood Updated: 10/23/23 0620     Glucose 138 mg/dL      BUN 17 mg/dL      Creatinine 1.00 mg/dL      Sodium 136 mmol/L      Potassium 4.2 mmol/L      Chloride 102 mmol/L      CO2 23.0 mmol/L      Calcium 9.0 mg/dL       Total Protein 6.6 g/dL      Albumin 3.9 g/dL      ALT (SGPT) 31 U/L      AST (SGOT) 49 U/L      Alkaline Phosphatase 191 U/L      Total Bilirubin 0.7 mg/dL      Globulin 2.7 gm/dL      A/G Ratio 1.4 g/dL      BUN/Creatinine Ratio 17.0     Anion Gap 11.0 mmol/L      eGFR 77.0 mL/min/1.73     Narrative:      GFR Normal >60  Chronic Kidney Disease <60  Kidney Failure <15    The GFR formula is only valid for adults with stable renal function between ages 18 and 70.    High Sensitivity Troponin T [[id number]]  (Abnormal) Collected: 10/23/23 0548    Specimen: Blood Updated: 10/23/23 0620     HS Troponin T 25 ng/L     Narrative:      High Sensitive Troponin T Reference Range:  <10.0 ng/L- Negative Female for AMI  <15.0 ng/L- Negative Male for AMI  >=10 - Abnormal Female indicating possible myocardial injury.  >=15 - Abnormal Male indicating possible myocardial injury.   Clinicians would have to utilize clinical acumen, EKG, Troponin, and serial changes to determine if it is an Acute Myocardial Infarction or myocardial injury due to an underlying chronic condition.         CK [[id number]]  (Normal) Collected: 10/23/23 0548    Specimen: Blood Updated: 10/23/23 0708     Creatine Kinase 158 U/L     Respiratory Panel PCR w/COVID-19(SARS-CoV-2) [facility] In-House, NP Swab in [facility], 3-4 HR TAT - Swab, Nasopharynx [[id number]]  (Abnormal) Collected: 10/23/23 0650    Specimen: Swab from Nasopharynx Updated: 10/23/23 0809     ADENOVIRUS, PCR Not Detected     Coronavirus 229E Not Detected     Coronavirus HKU1 Not Detected     Coronavirus NL63 Not Detected     Coronavirus OC43 Not Detected     COVID19 Detected     Human Metapneumovirus Not Detected     Human Rhinovirus/Enterovirus Not Detected     Influenza A PCR Not Detected     Influenza B PCR Not Detected     Parainfluenza Virus 1 Not Detected     Parainfluenza Virus 2 Not Detected     Parainfluenza Virus 3 Not Detected     Parainfluenza Virus 4 Not Detected      RSV, PCR Not Detected     Bordetella pertussis pcr Not Detected     Bordetella parapertussis PCR Not Detected     Chlamydophila pneumoniae PCR Not Detected     Mycoplasma pneumo by PCR Not Detected    Narrative:      In the setting of a positive respiratory panel with a viral infection PLUS a negative procalcitonin without other underlying concern for bacterial infection, consider observing off antibiotics or discontinuation of antibiotics and continue supportive care. If the respiratory panel is positive for atypical bacterial infection (Bordetella pertussis, Chlamydophila pneumoniae, or Mycoplasma pneumoniae), consider antibiotic de-escalation to target atypical bacterial infection.    High Sensitivity Troponin T 2Hr [[id number]]  (Abnormal) Collected: 10/23/23 0828    Specimen: Blood Updated: 10/23/23 0859     HS Troponin T 29 ng/L      Troponin T Delta 4 ng/L     Narrative:      High Sensitive Troponin T Reference Range:  <10.0 ng/L- Negative Female for AMI  <15.0 ng/L- Negative Male for AMI  >=10 - Abnormal Female indicating possible myocardial injury.  >=15 - Abnormal Male indicating possible myocardial injury.   Clinicians would have to utilize clinical acumen, EKG, Troponin, and serial changes to determine if it is an Acute Myocardial Infarction or myocardial injury due to an underlying chronic condition.         Urinalysis With Microscopic If Indicated (No Culture) - Straight Cath [[id number]]  (Abnormal) Collected: 10/23/23 0949    Specimen: Urine from Straight Cath Updated: 10/23/23 1013     Color, UA Yellow     Appearance, UA Clear     pH, UA 6.5     Specific Gravity, UA 1.020     Glucose, UA Negative     Ketones, UA Negative     Bilirubin, UA Negative     Blood, UA Small (1+)     Protein, UA Trace     Leuk Esterase, UA Negative     Nitrite, UA Negative     Urobilinogen, UA 1.0 E.U./dL    Urinalysis, Microscopic Only - Straight Cath [[id number]] Collected: 10/23/23 0949    Specimen: Urine from Straight  Cath Updated: 10/23/23 1049     RBC, UA None Seen /HPF      WBC, UA 0-2 /HPF      Bacteria, UA None Seen /HPF      Squamous Epithelial Cells, UA 0-2 /HPF      Hyaline Casts, UA None Seen /LPF      Methodology Manual Light Microscopy            Imaging Results (Last 24 Hours)       Procedure Component Value Units Date/Time    CT Head Without Contrast [[id number]] Collected: 10/23/23 0826     Updated: 10/23/23 0826    Narrative:      EMERGENCY CT SCAN OF THE HEAD WITHOUT CONTRAST ON 10/23/2023     CLINICAL HISTORY: Patient fell, left-sided head trauma, headache,  patient is on anticoagulation.     TECHNIQUE: Spiral CT images were obtained from the base of the skull to  the vertex without intravenous contrast. The images were reformatted and  submitted in 3 mm thick axial, sagittal and coronal CT sections with  brain algorithm and 2 mm thick axial CT sections with high-resolution  bone algorithm.     This is correlated to prior head CT without contrast [facility] on 02/19/2023 and a prior MRI of the brain on 03/16/2015.     FINDINGS: There is some patchy low-density extending from the  periventricular into the subcortical white matter of the cerebral  hemispheres consistent with mild-to-moderate small vessel disease. There  is a small 3-4 mm old lacunar infarct in the inferior lateral right  thalamus. There are tiny old lacunar infarcts in the anterior and mid  left corona radiata region. There is a focal area of encephalomalacia  involving the posterior superior left frontal lobe measuring 14 x 10 mm  in size compatible with an old posterior superior left frontal infarct  in the left MCA territory and this is unchanged dating back to an MRI of  the brain on 03/16/2015. The remainder of the brain parenchyma is normal  in attenuation. The ventricles are normal in size. I see no focal mass  effect. There is no midline shift. No extra axial fluid collections are  identified. There is no evidence of  acute intracranial hemorrhage. No  acute skull fracture is identified. The calvarium and skull base are  normal in appearance. There is a 3 mm calcific density in the  subcutaneous fat of the scalp overlying the anterior inferior left  frontal bone just above the left orbit, unchanged. The paranasal sinuses  and the mastoid air cells and middle ear cavities are clear.       Impression:      1. No acute intracranial abnormality is identified.  2. There is mild-to-moderate small vessel disease in the cerebral white  matter. There is a 4 mm old lacunar infarct in the inferior lateral  right thalamus and tiny old lacunar infarcts in the anterior mid left  corona radiata region and there is a 14 x 10 mm old posterior superior  lateral left frontal cortical infarct in the left MCA territory.  3. The remainder of the head CT is within normal limits. Specifically,  no acute skull fracture or intracranial hemorrhage is identified.     Radiation dose reduction techniques were utilized, including automated  exposure control and exposure modulation based on body size.          XR Chest 1 View [[id number]] Collected: 10/23/23 0749     Updated: 10/23/23 0754    Narrative:      EXAM: XR CHEST 1 VW-     INDICATION: Weakness     COMPARISON: Radiographs dating back to 3/10/2015          Impression:      No focal consolidation. No pleural effusion or pneumothorax.   Normal size cardiomediastinal silhouette. Nondisplaced median  sternotomy wires.        This report was finalized on 10/23/2023 7:51 AM by Dr. Alexander Causey M.D on Workstation: [id number]               Results for orders placed during the hospital encounter of 04/10/21    Adult Transthoracic Echo Complete w/ Color, Spectral and Contrast if Necessary Per Protocol    Interpretation Summary  · Estimated left ventricular EF = 57% Left ventricular systolic function is normal.  · Left ventricular diastolic function was normal.  · The right ventricular cavity is mildly  dilated.  · The right atrial cavity is mildly dilated.  · Mild mitral annular calcification is present. There is mild, bileaflet mitral valve thickening present. Trace mitral valve regurgitation is present. No significant mitral valve stenosis is present.  · No aortic valve regurgitation or stenosis is present. There is mild thickening of the aortic valve. The aortic valve appears trileaflet.      ECG 12 Lead Other; weakness   Final Result   HEART RATE= 97  bpm   RR Interval= 619  ms   DC Interval= 171  ms   P Horizontal Axis= -19  deg   P Front Axis= 54  deg   QRSD Interval= 107  ms   QT Interval= 347  ms   QTcB= 441  ms   QRS Axis= -25  deg   T Wave Axis= 97  deg   - ABNORMAL ECG -   Sinus rhythm   Left atrial enlargement   Incomplete left bundle branch block   LVH with secondary repolarization abnormality   Anterior Q waves, possibly due to LVH   No change from previous tracing   Electronically Signed By: Jg Chaney ([facility]) 23-Oct-2023 06:59:39   Date and Time of Study: 2023-10-23 05:44:15           Assessment/Plan     Active Hospital Problems    Diagnosis  POA    **COVID-19 [U07.1]  Yes    Polycythemia [D75.1]  Yes    Chronic anticoagulation [Z79.01]  Not Applicable    PAD (peripheral artery disease) [I73.9]  Yes    Stenosis of carotid artery [I65.29]  Yes    S/P ablation of atrial flutter [Z98.890, Z86.79]  Not Applicable    S/P CABG (coronary artery bypass graft) [Z95.1]  Not Applicable    Coronary artery disease due to lipid rich plaque [I25.10, I25.83]  Yes    HLD (hyperlipidemia) [E78.5]  Yes    Benign essential hypertension [I10]  Yes    Cerebrovascular accident [I63.9]  Yes      Resolved Hospital Problems   No resolved problems to display.       Mr. Santoro is a 77 y.o. former smoker with a history of HTN, a flutter, CVA, HLD, CAD w/ h/o CABG, polycythemia vera, gout, chronic pain/opioid dependence who presents with profound weakness/mechanical fall and was found to have COVID-19 infection.      Symptomatic, non-hypoxic COVID 19 infection  - pt is non-hypoxic, defer steroids  - symptoms started ~7 days ago, out of window for paxlovid  - start remdesivir, monitor LFTs, will add mucinex and hycodan for symptoms     Hypertension  CAD with history of CABG  Atrial flutter with history of ablation  - continue apixaban  - BP labile but mostly normal, hold home anti-hypertensive regimen while he is acutely ill and add back as needed    History of L MCA CVA  Hyperlipidemia  - continue crestor/Eliqius    Chronic pain syndrome/neuropathic pain  - continue Norco and Elavil    Gout  - continue allopurinol    Polycythemia vera  - continue hydrea QOD  - most recent CBC note reviewed    I discussed the patient's findings and my recommendations with patient, nursing staff, and ED provider.    VTE Prophylaxis - Eliquis (home med).  Code Status - Full code.       Jewell Rios MD  [facility]  10/23/23  13:25 EDT

## 2023-10-23 NOTE — PLAN OF CARE
Goal Outcome Evaluation: Pt resting in bed with no signs of distress noted at this time. VS stable. Pt remains on RA. Bed in lowest position with siderails up X2. Call light within reach. RN will continue to monitor.

## 2023-10-23 NOTE — ED PROVIDER NOTES
The LORNA and I have discussed this patient's history, physical exam and treatment plan.  I provided a substantive portion of the care of this patient.  I have reviewed the documentation and personally had a face to face interaction with the patient and personally performed the physical exam, in its entirety.  I affirm the documentation and agree with the treatment and plan.  The following describes my personal findings.      The patient presents complaining of generalized weakness, fall, unable to get off the floor for 3 to 4 hours this morning.  Patient denies any specific injury, does report he has just had but denies loss of consciousness.  Patient reports he is on blood thinners.  Patient denies shortness of air, chest pain, does report he has had a dry cough for a month.  Patient denies fevers at home, abdominal pain, nausea/vomiting, reports she has chronic urinary frequency,    Comprehensive Physical exam:  Patient is nontoxic appearing oriented, conversant awake, alert  HEENT: normocephalic, abrasion of left ear  Neck: No cervical spine tenderness to palpation, no step-off no goiter, no pain with ROM  Pulmonary: Nontachypneic, breath sounds heard well bilaterally, chest wall nontender  cardiovascular: Nontachycardic  Abdomen: Soft, nontender, thin  musculoskeletal: Good range of motion, pulse, sensation x4  Neuro/psychiatric:calm, appropriate, cooperative  Skin:warm, dry           Christy Dennison MD  10/23/23 4724

## 2023-10-24 ENCOUNTER — SPECIALTY PHARMACY (OUTPATIENT)
Dept: PHARMACY | Facility: HOSPITAL | Age: 78
End: 2023-10-24
Payer: MEDICARE

## 2023-10-24 LAB
ALBUMIN SERPL-MCNC: 3.6 G/DL (ref 3.5–5.2)
ALBUMIN/GLOB SERPL: 1.3 G/DL
ALP SERPL-CCNC: 204 U/L (ref 39–117)
ALT SERPL W P-5'-P-CCNC: 55 U/L (ref 1–41)
ANION GAP SERPL CALCULATED.3IONS-SCNC: 11.5 MMOL/L (ref 5–15)
ANISOCYTOSIS BLD QL: ABNORMAL
AST SERPL-CCNC: 97 U/L (ref 1–40)
BILIRUB SERPL-MCNC: 0.8 MG/DL (ref 0–1.2)
BUN SERPL-MCNC: 15 MG/DL (ref 8–23)
BUN/CREAT SERPL: 15.5 (ref 7–25)
CALCIUM SPEC-SCNC: 8.3 MG/DL (ref 8.6–10.5)
CHLORIDE SERPL-SCNC: 99 MMOL/L (ref 98–107)
CO2 SERPL-SCNC: 21.5 MMOL/L (ref 22–29)
CREAT SERPL-MCNC: 0.97 MG/DL (ref 0.76–1.27)
DEPRECATED RDW RBC AUTO: 51.7 FL (ref 37–54)
EGFRCR SERPLBLD CKD-EPI 2021: 79.9 ML/MIN/1.73
ERYTHROCYTE [DISTWIDTH] IN BLOOD BY AUTOMATED COUNT: 12.6 % (ref 12.3–15.4)
GLOBULIN UR ELPH-MCNC: 2.7 GM/DL
GLUCOSE SERPL-MCNC: 84 MG/DL (ref 65–99)
HCT VFR BLD AUTO: 34.6 % (ref 37.5–51)
HGB BLD-MCNC: 11.9 G/DL (ref 13–17.7)
LYMPHOCYTES # BLD MANUAL: 0.51 10*3/MM3 (ref 0.7–3.1)
LYMPHOCYTES NFR BLD MANUAL: 16.3 % (ref 5–12)
MACROCYTES BLD QL SMEAR: ABNORMAL
MAGNESIUM SERPL-MCNC: 2 MG/DL (ref 1.6–2.4)
MCH RBC QN AUTO: 38.8 PG (ref 26.6–33)
MCHC RBC AUTO-ENTMCNC: 34.4 G/DL (ref 31.5–35.7)
MCV RBC AUTO: 112.7 FL (ref 79–97)
MONOCYTES # BLD: 0.68 10*3/MM3 (ref 0.1–0.9)
NEUTROPHILS # BLD AUTO: 2.98 10*3/MM3 (ref 1.7–7)
NEUTROPHILS NFR BLD MANUAL: 71.4 % (ref 42.7–76)
PHOSPHATE SERPL-MCNC: 2.1 MG/DL (ref 2.5–4.5)
PLAT MORPH BLD: NORMAL
PLATELET # BLD AUTO: 159 10*3/MM3 (ref 140–450)
PMV BLD AUTO: 10.9 FL (ref 6–12)
POLYCHROMASIA BLD QL SMEAR: ABNORMAL
POTASSIUM SERPL-SCNC: 4.6 MMOL/L (ref 3.5–5.2)
PROT SERPL-MCNC: 6.3 G/DL (ref 6–8.5)
PYRIDOXAL PHOS SERPL-MCNC: NORMAL UG/L
RBC # BLD AUTO: 3.07 10*6/MM3 (ref 4.14–5.8)
SODIUM SERPL-SCNC: 132 MMOL/L (ref 136–145)
VARIANT LYMPHS NFR BLD MANUAL: 10.2 % (ref 19.6–45.3)
VARIANT LYMPHS NFR BLD MANUAL: 2 % (ref 0–5)
WBC MORPH BLD: NORMAL
WBC NRBC COR # BLD: 4.18 10*3/MM3 (ref 3.4–10.8)

## 2023-10-24 PROCEDURE — 83735 ASSAY OF MAGNESIUM: CPT | Performed by: STUDENT IN AN ORGANIZED HEALTH CARE EDUCATION/TRAINING PROGRAM

## 2023-10-24 PROCEDURE — 25810000003 SODIUM CHLORIDE 0.9 % SOLUTION 250 ML FLEX CONT: Performed by: STUDENT IN AN ORGANIZED HEALTH CARE EDUCATION/TRAINING PROGRAM

## 2023-10-24 PROCEDURE — 36415 COLL VENOUS BLD VENIPUNCTURE: CPT | Performed by: STUDENT IN AN ORGANIZED HEALTH CARE EDUCATION/TRAINING PROGRAM

## 2023-10-24 PROCEDURE — G0378 HOSPITAL OBSERVATION PER HR: HCPCS

## 2023-10-24 PROCEDURE — 97530 THERAPEUTIC ACTIVITIES: CPT

## 2023-10-24 PROCEDURE — 97535 SELF CARE MNGMENT TRAINING: CPT

## 2023-10-24 PROCEDURE — 25010000002 REMDESIVIR 100 MG/20ML SOLUTION 1 EACH VIAL: Performed by: STUDENT IN AN ORGANIZED HEALTH CARE EDUCATION/TRAINING PROGRAM

## 2023-10-24 PROCEDURE — 85007 BL SMEAR W/DIFF WBC COUNT: CPT | Performed by: STUDENT IN AN ORGANIZED HEALTH CARE EDUCATION/TRAINING PROGRAM

## 2023-10-24 PROCEDURE — 84100 ASSAY OF PHOSPHORUS: CPT | Performed by: STUDENT IN AN ORGANIZED HEALTH CARE EDUCATION/TRAINING PROGRAM

## 2023-10-24 PROCEDURE — 80053 COMPREHEN METABOLIC PANEL: CPT | Performed by: STUDENT IN AN ORGANIZED HEALTH CARE EDUCATION/TRAINING PROGRAM

## 2023-10-24 PROCEDURE — 97166 OT EVAL MOD COMPLEX 45 MIN: CPT

## 2023-10-24 PROCEDURE — 97162 PT EVAL MOD COMPLEX 30 MIN: CPT

## 2023-10-24 PROCEDURE — 85025 COMPLETE CBC W/AUTO DIFF WBC: CPT | Performed by: STUDENT IN AN ORGANIZED HEALTH CARE EDUCATION/TRAINING PROGRAM

## 2023-10-24 RX ORDER — GUAIFENESIN 600 MG/1
600 TABLET, EXTENDED RELEASE ORAL EVERY 12 HOURS SCHEDULED
Status: DISCONTINUED | OUTPATIENT
Start: 2023-10-24 | End: 2023-10-26

## 2023-10-24 RX ORDER — HYDROCODONE BITARTRATE AND HOMATROPINE METHYLBROMIDE ORAL SOLUTION 5; 1.5 MG/5ML; MG/5ML
5 LIQUID ORAL EVERY 4 HOURS PRN
Status: DISPENSED | OUTPATIENT
Start: 2023-10-24 | End: 2023-11-06

## 2023-10-24 RX ADMIN — OXYBUTYNIN CHLORIDE 5 MG: 5 TABLET, EXTENDED RELEASE ORAL at 08:19

## 2023-10-24 RX ADMIN — LOSARTAN POTASSIUM 50 MG: 50 TABLET, FILM COATED ORAL at 08:19

## 2023-10-24 RX ADMIN — ROSUVASTATIN CALCIUM 20 MG: 20 TABLET, FILM COATED ORAL at 08:19

## 2023-10-24 RX ADMIN — REMDESIVIR 100 MG: 100 INJECTION, POWDER, LYOPHILIZED, FOR SOLUTION INTRAVENOUS at 15:47

## 2023-10-24 RX ADMIN — AMITRIPTYLINE HYDROCHLORIDE 50 MG: 50 TABLET, FILM COATED ORAL at 21:54

## 2023-10-24 RX ADMIN — AMLODIPINE BESYLATE 2.5 MG: 2.5 TABLET ORAL at 08:19

## 2023-10-24 RX ADMIN — ALLOPURINOL 300 MG: 300 TABLET ORAL at 08:19

## 2023-10-24 RX ADMIN — APIXABAN 5 MG: 5 TABLET, FILM COATED ORAL at 21:54

## 2023-10-24 RX ADMIN — PANTOPRAZOLE SODIUM 40 MG: 40 TABLET, DELAYED RELEASE ORAL at 06:03

## 2023-10-24 RX ADMIN — GUAIFENESIN 600 MG: 600 TABLET, EXTENDED RELEASE ORAL at 21:54

## 2023-10-24 RX ADMIN — ACETAMINOPHEN 650 MG: 325 TABLET, FILM COATED ORAL at 08:19

## 2023-10-24 RX ADMIN — APIXABAN 5 MG: 5 TABLET, FILM COATED ORAL at 08:19

## 2023-10-24 RX ADMIN — HYDROCODONE BITARTRATE AND ACETAMINOPHEN 1 TABLET: 10; 325 TABLET ORAL at 15:47

## 2023-10-24 RX ADMIN — Medication 10 ML: at 09:37

## 2023-10-24 NOTE — PLAN OF CARE
Goal Outcome Evaluation:  Plan of Care Reviewed With: patient        Progress: no change  Outcome Evaluation: Patient is a 77 year old male admitted after a fall at home and found to have covid. Patient is fatigued today and SOA, with very quiet voice quality, but reports he lives at home with spouse, no steps and is retired, did not use AD for mobility and independent with ADLs/IADLs. Today patient has difficulty with bed mobility, and supine<>sitting requiring max A. Patient performed grooming and hygiene supported in bed, and assisted with self feeding both requiring mod A with frequent rest breaks due to fatigue. Patient reports not feeling well today, with elevated temp. Patient will continue to benefit from skilled OT to address all current functional deficits and [place] SNF [place] due to deconditioning related to covid and hospital stay.      Anticipated Discharge Disposition (OT): skilled nursing facility

## 2023-10-24 NOTE — CASE MANAGEMENT/SOCIAL WORK
Continued Stay Note  [facility]     Patient Name: Madhu Santoro  MRN: [id number]  Today's Date: 10/24/2023    Admit Date: 10/23/2023        Discharge Plan       Row Name 10/24/23 1639       Plan    Plan Comments CCP called into pts room, no answer. CCP called pts wife Brooke [phone] and left hippa compliant vm requesting call back. Reviewed PT notes and pt weak and anticipate SNF at [place]. Await wife's call back. Danelle SNYDER/CCP                   Discharge Codes    No documentation.                 Expected Discharge Date and Time       Expected Discharge Date Expected Discharge Time    Oct 26, 2023               Monica Russell, RN

## 2023-10-24 NOTE — THERAPY EVALUATION
Patient Name: Madhu Santoro  : 1945    MRN: [id number]                              Today's Date: 10/24/2023       Admit Date: 10/23/2023    Visit Dx:     ICD-10-CM ICD-9-CM   1. COVID-19  U07.1 079.89   2. Generalized weakness  R53.1 780.79   3. Fall at home, initial encounter  W19.XXXA E888.9    Y92.009 E849.0   4. Abrasion of left ear, initial encounter  S00.412A 910.0   5. Elevated troponin  R79.89 790.6     Patient Active Problem List   Diagnosis    Anxiety    Arthritis    Coronary artery disease due to lipid rich plaque    HLD (hyperlipidemia)    Benign essential hypertension    DDD (degenerative disc disease), lumbosacral    Cerebrovascular accident    Encounter for screening for cardiovascular disorders    Gastroesophageal reflux disease    Hyperlipidemia    Stenosis of carotid artery    Former smoker    History of cardiac catheterization    S/P ablation of atrial flutter    Typical atrial flutter    S/P CABG (coronary artery bypass graft)    Adenomatous polyp of ascending colon    Abdominal bloating    Stroke    PAD (peripheral artery disease)    Acute cholecystitis    Right upper quadrant abdominal pain    Chronic pain of both hips    Chronic bilateral low back pain without sciatica    Sacroiliac joint dysfunction of both sides    Encounter for long-term (current) use of high-risk medication    Lumbar facet arthropathy    Stroke-like symptoms    CVA (cerebral vascular accident)    Personal history of colonic polyps    Arthralgia of multiple joints    Iron deficiency    Thrombocytosis    Left ureteral stone    Obstructive uropathy    Chronic anticoagulation    Facial skin lesion    Iron deficiency anemia    Polycythemia    Neuropathy    Weakness    Pneumonia    Close exposure to COVID-19 virus    Polycythemia vera    Chronic gout without tophus    COVID-19     Past Medical History:   Diagnosis Date    Anxiety     Arthritis     Atrial flutter     Status post cavotricuspid isthmus ablation by   "[facility] on 4/18/17    Mandel esophagus     Benign essential hypertension     CAD (coronary artery disease)     3 vessel CABG 4/11/17 by Dr. Cortez: ROSARIO-prox LAD, SVG-OM1, SVG-OM3    Carotid artery disease     Status post carotid endarterectomy - USG 4/10/17: 50-59% NICHELLE, 1-15% LICA.     Colonic polyp     Cyst of pancreas     DDD (degenerative disc disease), lumbosacral     GERD (gastroesophageal reflux disease)     H/O bone density study 2013    H/O complete eye exam 2014    History of kidney stone     HLD (hyperlipidemia)     Hypertension     Kidney stone     8/22/22    Lipid screening 05/31/2013    Low back pain     physical therapy [facility] 5-12-10    Screening for prostate cancer 07/07/2015    Skin cancer     nose    Stroke     RESIDUAL--\"BALANCE ISSUES\"     Past Surgical History:   Procedure Laterality Date    CARDIAC CATHETERIZATION N/A 04/10/2017    Procedure: Left Heart Cath;  Surgeon: Marjorie Healy MD;  Location:  [facility] C[facility] INVASIVE [facility];  Service:     CARDIAC CATHETERIZATION N/A 04/10/2017    Procedure: Coronary angiography;  Surgeon: Marjorie Healy MD;  Location:  [facility];  Service:     CARDIAC CATHETERIZATION N/A 04/10/2017    Procedure: Left ventriculography;  Surgeon: Marjorie Healy MD;  Location: [facility] CATH INVASIVE LOCATION;  Service:     CARDIAC CATHETERIZATION  2011    CARDIAC ELECTROPHYSIOLOGY PROCEDURE N/A 04/18/2017    Procedure: Ablation atrial flutter;  Surgeon: Jose Antonio Gustafson MD;  Location:  [facility] C[facility]VASIVE [facility];  Service:     CAROTID ENDARTERECTOMY      CHOLECYSTECTOMY      CHOLECYSTECTOMY WITH INTRAOPERATIVE CHOLANGIOGRAM N/A 09/07/2019    Procedure: Laparoscopic cholecystectomy with intraoperative cholangiogram;  Surgeon: Gauri Travis MD;  Location: [facility] MAIN OR;  Service: General    COLONOSCOPY  01/06/2015    Diverticulosis, one TA    COLONOSCOPY N/A 02/14/2019    tics, NBIH, adenomatous polyp x 2    COLONOSCOPY N/A 11/05/2021    " Procedure: COLONOSCOPY TO CECUM AND TERM. ILEUM WITH COLD POLYPECTOMIES;  Surgeon: Everton Abel MD;  Location: [facility] ENDOSCOPY;  Service: Gastroenterology;  Laterality: N/A;  PRE OP - PERS H/O POLYPS  POST OP - COLON POLYPS,, DIVERTICULOSIS, HEMORRHOIDS    CORONARY ARTERY BYPASS GRAFT N/A 04/11/2017    Procedure: AR STERNOTOMY CORONARY ARTERY BYPASS GRAFT TIMES 3 USING LEFT INTERNAL MAMMARY ARTERY AND LEFT GREATER SAPHENOUS VEIN GRAFT PER ENDOSCOPIC VEIN HARVESTING AND PRP ;  Surgeon: Temo Cortez MD;  Location: [facility] MAIN OR;  Service:     ENDOSCOPY  01/06/2015    HH, Ervin's esophagus    ENDOSCOPY N/A 02/14/2019    Z line irregular, HH, Ervin's esophagus    ENDOSCOPY N/A 11/05/2021    Procedure: ESOPHAGOGASTRODUODENOSCOPY WITH BIOPSIES;  Surgeon: Everton Abel MD;  Location: [facility] ENDOSCOPY;  Service: Gastroenterology;  Laterality: N/A;  PRE OP - PERS H/O ERVIN'S  POST OP - IRREG Z LINE    KNEE SURGERY Left     URETEROSCOPY LASER LITHOTRIPSY WITH STENT INSERTION Left 8/23/2022    Procedure: Cysto retrograde with left uretro stent placement;  Surgeon: Damien Oliveira MD;  Location: [facility] MAIN OR;  Service: Urology;  Laterality: Left;    VASECTOMY        General Information       Row Name 10/24/23 1549          OT Time and Intention    Document Type evaluation  -     Mode of Treatment individual therapy;occupational therapy  -       Row Name 10/24/23 1549          General Information    Patient Profile Reviewed yes  -     Prior Level of Function independent:  -     Existing Precautions/Restrictions fall  Enhanced contact precautions: covid+  -     Barriers to Rehab medically complex  -       Row Name 10/24/23 1549          Living Environment    People in Home spouse  -       Row Name 10/24/23 1549          Cognition    Orientation Status (Cognition) oriented to;person;time;situation  -       Row Name 10/24/23 1549          Safety Issues, Functional Mobility    Safety  Issues Affecting Function (Mobility) ability to follow commands  -     Impairments Affecting Function (Mobility) balance;coordination;endurance/activity tolerance;strength;shortness of breath  -               User Key  (r) = Recorded By, (t) = Taken By, (c) = Cosigned By      Initials Name Provider Type     Alissa Gomes OT Occupational Therapist                     Mobility/ADL's       Row Name 10/24/23 1550          Bed Mobility    Bed Mobility supine-sit;sit-supine  -     Supine-Sit Bee (Bed Mobility) verbal cues;nonverbal cues (demo/gesture);maximum assist (25% patient effort)  -     Assistive Device (Bed Mobility) bed rails;head of bed elevated  -       Row Name 10/24/23 1550          Functional Mobility    Patient was able to Ambulate no, other medical factors prevent ambulation  -       Row Name 10/24/23 1550          Activities of Daily Living    BADL Assessment/Intervention feeding;grooming  -[facility] Name 10/24/23 1550          Self-Feeding Assessment/Training    [place]ee Level (Feeding) feeding skills;prepare tray/open items;scoop food and bring to mouth;moderate assist (50% patient effort);liquids to mouth  -       Row Name 10/24/23 1550          Grooming Assessment/Training    [place] Level (Grooming) grooming skills;wash face, hands;moderate assist (50% patient effort)  -               User Key  (r) = Recorded By, (t) = Taken By, (c) = Cosigned By      Initials Name Provider Type     Alissa Gomes OT Occupational Therapist                   Obj/Interventions       Row Name 10/24/23 1551          Vision Assessment/Intervention    Visual Impairment/Limitations WNL  -       Row Name 10/24/23 1551          Range of Motion Comprehensive    General Range of Motion bilateral upper extremity ROM WFL  -     Comment, General Range of Motion Shoulder ROM to 75%, possibly due to muscle weakness  -       Row Name 10/24/23 1551          Strength Comprehensive (MMT)     Comment, General Manual Muscle Testing (MMT) Assessment BUE 3-/5  -       Row Name 10/24/23 1554          Balance    Balance Interventions sitting;occupation based/functional task  -               User Key  (r) = Recorded By, (t) = Taken By, (c) = Cosigned By      Initials Name Provider Type     Alissa Gomes OT Occupational Therapist                   Goals/Plan       Row Name 10/24/23 4922          Bed Mobility Goal 1 (OT)    Activity/Assistive Device (Bed Mobility Goal 1, OT) bed mobility activities, all  -     [place] Level/Cues Needed (Bed Mobility Goal 1, OT) supervision required  -     Time Frame (Bed Mobility Goal 1, OT) short term goal (STG);2 weeks  -     Progress/Outcomes (Bed Mobility Goal 1, OT) new goal  -       Row Name 10/24/23 3124          Transfer Goal 1 (OT)    Activity/Assistive Device (Transfer Goal 1, OT) transfers, all  -     [place] Level/Cues Needed (Transfer Goal 1, OT) standby assist  -     Time Frame (Transfer Goal 1, OT) short term goal (STG);2 weeks  -     Progress/Outcome (Transfer Goal 1, OT) new goal  -       Row Name 10/24/23 2023          Dressing Goal 1 (OT)    Activity/Device (Dressing Goal 1, OT) dressing skills, all;upper body dressing;lower body dressing  -     [place]/Cues Needed (Dressing Goal 1, OT) standby assist  -     Time Frame (Dressing Goal 1, OT) short term goal (STG);2 weeks  -     Progress/Outcome (Dressing Goal 1, OT) new goal  -       Row Name 10/24/23 8483          Toileting Goal 1 (OT)    Activity/Device (Toileting Goal 1, OT) toileting skills, all  -     [place] Level/Cues Needed (Toileting Goal 1, OT) standby assist  -     Time Frame (Toileting Goal 1, OT) short term goal (STG);2 weeks  -     Progress/Outcome (Toileting Goal 1, OT) new goal  -       Row Name 10/24/23 5214          Therapy Assessment/Plan (OT)    Planned Therapy Interventions (OT) activity tolerance training;BADL retraining;functional  balance retraining;occupation/activity based interventions;patient/caregiver education/training;transfer/mobility retraining;strengthening exercise  -               User Key  (r) = Recorded By, (t) = Taken By, (c) = Cosigned By      Initials Name Provider Type     Alissa Gomes, MEHDI Occupational Therapist                   Clinical Impression       Row Name 10/24/23 2162          Pain Assessment    Pretreatment Pain Rating 0/10 - no pain  -     Posttreatment Pain Rating 0/10 - no pain  -     Pain Intervention(s) Ambulation/increased activity;Repositioned  -       Row Name 10/24/23 6452          Plan of Care Review    Plan of Care Reviewed With patient  -     Progress no change  -     Outcome Evaluation Patient is a 77 year old male admitted after a fall at home and found to have covid. Patient is fatigued today and SOA, with very quiet voice quality, but reports he lives at home with spouse, no steps and is retired, did not use AD for mobility and independent with ADLs/IADLs. Today patient has difficulty with bed mobility, and supine<>sitting requiring max A. Patient performed grooming and hygiene supported in bed, and assisted with self feeding both requiring mod A with frequent rest breaks due to fatigue. Patient reports not feeling well today, with elevated temp. Patient will continue to benefit from skilled OT to address all current functional deficits and recommend SNF at [place] due to deconditioning related to covid and hospital stay.  -       Row Name 10/24/23 1552          Therapy Assessment/Plan (OT)    Rehab Potential (OT) good, to achieve stated therapy goals  -     Criteria for Skilled Therapeutic Interventions Met (OT) yes;meets criteria;skilled treatment is necessary  -     Therapy Frequency (OT) 5 times/wk  -       Row Name 10/24/23 0612          Therapy Plan Review/Discharge Plan (OT)    Anticipated Discharge Disposition (OT) skilled nursing facility  -       Row Name 10/24/23 0912           Positioning and Restraints    Pre-Treatment Position in bed  -     Post Treatment Position bed  -LH     In Bed call light within reach;encouraged to call for assist;fowlers;exit alarm on  -               User Key  (r) = Recorded By, (t) = Taken By, (c) = Cosigned By      Initials Name Provider Type     Alissa Gomes, OT Occupational Therapist                   Outcome Measures       Row Name 10/24/23 1556          How much help from another is currently needed...    Putting on and taking off regular lower body clothing? 1  -LH     Bathing (including washing, rinsing, and drying) 1  -LH     Toileting (which includes using toilet bed pan or urinal) 1  -LH     Putting on and taking off regular upper body clothing 2  -LH     Taking care of personal grooming (such as brushing teeth) 2  -LH     Eating meals 2  -     AM-PAC 6 Clicks Score (OT) 9  -       Row Name 10/24/23 1535 10/24/23 0819       How much help from another person do you currently need...    Turning from your back to your side while in flat bed without using bedrails? 2  -CH 4  -KK    Moving from lying on back to sitting on the side of a flat bed without bedrails? 2  -CH 3  -KK    Moving to and from a bed to a chair (including a wheelchair)? 2  -CH 3  -KK    Standing up from a chair using your arms (e.g., wheelchair, bedside chair)? 2  -CH 3  -KK    Climbing 3-5 steps with a railing? 1  -CH 2  -KK    To walk in hospital room? 1  -CH 3  -KK    AM-PAC 6 Clicks Score (PT) 10  - 18  -KK    Highest level of mobility 4 --> Transferred to chair/commode  - 6 --> Walked 10 steps or more  -      Row Name 10/24/23 1556 10/24/23 1535       Functional Assessment    Outcome Measure Options AM-PAC 6 Clicks Daily Activity (OT)  - AM-PAC 6 Clicks Basic Mobility (PT)  -              User Key  (r) = Recorded By, (t) = Taken By, (c) = Cosigned By      Initials Name Provider Type     Blanca Elkins, PT Physical Therapist    KK Licha Charles,  RN Registered Nurse     Alissa Gomes OT Occupational Therapist                    Occupational Therapy Education       Title: PT OT SLP Therapies (Done)       Topic: Occupational Therapy (Done)       Point: ADL training (Done)       Description:   Instruct learner(s) on proper safety adaptation and remediation techniques during self care or transfers.   Instruct in proper use of assistive devices.                  Learning Progress Summary             Patient Acceptance, E,TB, VU by  at 10/24/2023 8183    Comment: Patient instructed in role of OT, covid precautions, fall prevention, and discharge planning                         Point: Home exercise program (Done)       Description:   Instruct learner(s) on appropriate technique for monitoring, assisting and/or progressing therapeutic exercises/activities.                  Learning Progress Summary             Patient Acceptance, E,TB, VU by  at 10/24/2023 1723    Comment: Patient instructed in role of OT, covid precautions, fall prevention, and discharge planning                         Point: Precautions (Done)       Description:   Instruct learner(s) on prescribed precautions during self-care and functional transfers.                  Learning Progress Summary             Patient Acceptance, E,TB, VU by  at 10/24/2023 6504    Comment: Patient instructed in role of OT, covid precautions, fall prevention, and discharge planning                         Point: Body mechanics (Done)       Description:   Instruct learner(s) on proper positioning and spine alignment during self-care, functional mobility activities and/or exercises.                  Learning Progress Summary             Patient Acceptance, E,TB, VU by  at 10/24/2023 0938    Comment: Patient instructed in role of OT, covid precautions, fall prevention, and discharge planning                                         User Key       Initials Effective Dates Name Provider Type Discipline      08/31/23 -  Alissa Gomes, OT Occupational Therapist OT                  OT Recommendation and Plan  Planned Therapy Interventions (OT): activity tolerance training, BADL retraining, functional balance retraining, occupation/activity based interventions, patient/caregiver education/training, transfer/mobility retraining, strengthening exercise  Therapy Frequency (OT): 5 times/wk  Plan of Care Review  Plan of Care Reviewed With: patient  Progress: no change  Outcome Evaluation: Patient is a 77 year old male admitted after a fall at home and found to have covid. Patient is fatigued today and SOA, with very quiet voice quality, but reports he lives at home with spouse, no steps and is retired, did not use AD for mobility and independent with ADLs/IADLs. Today patient has difficulty with bed mobility, and supine<>sitting requiring max A. Patient performed grooming and hygiene supported in bed, and assisted with self feeding both requiring mod A with frequent rest breaks due to fatigue. Patient reports not feeling well today, with elevated temp. Patient will continue to benefit from skilled OT to address all current functional deficits and recommend SNF at [place] due to deconditioning related to covid and hospital stay.     Time Calculation:   Evaluation Complexity (OT)  Review Occupational Profile/Medical/Therapy History Complexity: expanded/moderate complexity  Assessment, Occupational Performance/Identification of Deficit Complexity: 3-5 performance deficits  Clinical Decision Making Complexity (OT): detailed assessment/moderate complexity  Overall Complexity of Evaluation (OT): moderate complexity     Time Calculation- OT       Row Name 10/24/23 1558             Time Calculation- OT    OT Start Time 1420  -      OT Stop Time 1446  -      OT Time Calculation (min) 26 min  -      Total Timed Code Minutes- OT 15 minute(s)  -      OT Non-Billable Time (min) 11 min  -      OT Received On 10/24/23  -      OT - Next  Appointment 10/26/23  -      OT Goal Re-Cert Due Date 11/07/23  -         Timed Charges    10337 - OT Self Care/Mgmt Minutes 15  -LH         Untimed Charges    OT Eval/Re-eval Minutes 11  -LH         Total Minutes    Timed Charges Total Minutes 15  -LH      Untimed Charges Total Minutes 11  -LH       Total Minutes 26  -                User Key  (r) = Recorded By, (t) = Taken By, (c) = Cosigned By      Initials Name Provider Type     Alissa Gomes OT Occupational Therapist                  Therapy Charges for Today       Code Description Service Date Service Provider Modifiers Qty    [id number]  OT SELF CARE/MGMT/TRAIN EA 15 MIN 10/24/2023 Alissa Gomes OT GO 1    [id number]  OT EVAL MOD COMPLEXITY 3 10/24/2023 Alissa Gomes OT GO 1                 Alissa Gomes OT  10/24/2023

## 2023-10-24 NOTE — PROGRESS NOTES
Name: Madhu Santoro ADMIT: 10/23/2023   : 1945  PCP: Damian Sanchez MD    MRN: [id number] LOS: 0 days   AGE/SEX: 77 y.o. male  ROOM: [facility]     Subjective   Subjective   Resting in bed, family at bedside.  He reports he still feels bad, complaining of sore throat and cough which is productive.  Febrile to 102.7 this morning treated with Tylenol.       Objective   Objective   Vital Signs  Temp:  [98.2 °F (36.8 °C)-102.7 °F (39.3 °C)] 100.8 °F (38.2 °C)  Heart Rate:  [] 98  Resp:  [17-20] 18  BP: (153-169)/(68-82) 153/72  SpO2:  [95 %-97 %] 95 %  on   ;   Device (Oxygen Therapy): room air  Body mass index is 26.43 kg/m².  Physical Exam  Constitutional:       General: He is not in acute distress.     Appearance: He is ill-appearing and diaphoretic. He is not toxic-appearing.   Cardiovascular:      Rate and Rhythm: Normal rate. Rhythm irregular.   Pulmonary:      Effort: Pulmonary effort is normal. No respiratory distress.      Breath sounds: Normal breath sounds. No wheezing.   Abdominal:      General: Bowel sounds are normal.      Palpations: Abdomen is soft.      Tenderness: There is no abdominal tenderness.   Musculoskeletal:         General: No tenderness.      Right lower leg: No edema.      Left lower leg: No edema.   Skin:     General: Skin is warm and dry.   Neurological:      General: No focal deficit present.      Mental Status: He is alert and oriented to person, place, and time. Mental status is at baseline.      Motor: No weakness.         Results Review     I reviewed the patient's new clinical results.  Results from last 7 days   Lab Units 10/24/23  0443 10/23/23  0507 10/20/23  1326   WBC 10*3/mm3 4.18 8.40 8.04   HEMOGLOBIN g/dL 11.9* 13.1 13.1   PLATELETS 10*3/mm3 159 208 247     Results from last 7 days   Lab Units 10/24/23  0443 10/23/23  0548   SODIUM mmol/L 132* 136   POTASSIUM mmol/L 4.6 4.2   CHLORIDE mmol/L 99 102   CO2 mmol/L 21.5* 23.0   BUN mg/dL 15 17   CREATININE  "mg/dL 0.97 1.00   GLUCOSE mg/dL 84 138*   Estimated Creatinine Clearance: 70 mL/min (by C-G formula based on SCr of 0.97 mg/dL).  Results from last 7 days   Lab Units 10/24/23  0443 10/23/23  0548 10/20/23  1329   ALBUMIN g/dL 3.6 3.9 3.7   BILIRUBIN mg/dL 0.8 0.7  --    ALK PHOS U/L 204* 191*  --    AST (SGOT) U/L 97* 49*  --    ALT (SGPT) U/L 55* 31  --      Results from last 7 days   Lab Units 10/24/23  0443 10/23/23  0548 10/20/23  1329   CALCIUM mg/dL 8.3* 9.0  --    ALBUMIN g/dL 3.6 3.9 3.7   MAGNESIUM mg/dL 2.0  --   --    PHOSPHORUS mg/dL 2.1*  --   --      Results from last 7 days   Lab Units 10/23/23  0507   LACTATE mmol/L 1.7     COVID19   Date Value Ref Range Status   10/23/2023 Detected (C) Not Detected - Ref. Range Final   02/21/2023 Not Detected Not Detected - Ref. Range Final     No results found for: \"HGBA1C\", \"POCGLU\"    CT Head Without Contrast  Narrative: EMERGENCY CT SCAN OF THE HEAD WITHOUT CONTRAST ON 10/23/2023     CLINICAL HISTORY: Patient fell, left-sided head trauma, headache,  patient is on anticoagulation.     TECHNIQUE: Spiral CT images were obtained from the base of the skull to  the vertex without intravenous contrast. The images were reformatted and  submitted in 3 mm thick axial, sagittal and coronal CT sections with  brain algorithm and 2 mm thick axial CT sections with high-resolution  bone algorithm.     This is correlated to prior head CT without contrast from [facility] on 02/19/2023 and a prior MRI of the brain on 03/16/2015.     FINDINGS: There is some patchy low-density extending from the  periventricular into the subcortical white matter of the cerebral  hemispheres consistent with mild-to-moderate small vessel disease. There  is a small 3-4 mm old lacunar infarct in the inferior lateral right  thalamus. There are tiny old lacunar infarcts in the anterior and mid  left corona radiata region. There is a focal area of encephalomalacia  involving the posterior " superior left frontal lobe measuring 14 x 10 mm  in size compatible with an old posterior superior left frontal infarct  in the left MCA territory and this is unchanged dating back to an MRI of  the brain on 03/16/2015. The remainder of the brain parenchyma is normal  in attenuation. The ventricles are normal in size. I see no focal mass  effect. There is no midline shift. No extra axial fluid collections are  identified. There is no evidence of acute intracranial hemorrhage. No  acute skull fracture is identified. The calvarium and skull base are  normal in appearance. There is a 3 mm calcific density in the  subcutaneous fat of the scalp overlying the anterior inferior left  frontal bone just above the left orbit, unchanged. The paranasal sinuses  and the mastoid air cells and middle ear cavities are clear.     Impression: 1. No acute intracranial abnormality is identified.  2. There is mild-to-moderate small vessel disease in the cerebral white  matter. There is a 4 mm old lacunar infarct in the inferior lateral  right thalamus and tiny old lacunar infarcts in the anterior mid left  corona radiata region and there is a 14 x 10 mm old posterior superior  lateral left frontal cortical infarct in the left MCA territory.  3. The remainder of the head CT is within normal limits. Specifically,  no acute skull fracture or intracranial hemorrhage is identified.     Radiation dose reduction techniques were utilized, including automated  exposure control and exposure modulation based on body size.        This report was finalized on 10/24/2023 8:07 AM by Dr. Christiano Leon M.D  on Workstation: [id number]       Scheduled Medications  allopurinol, 300 mg, Oral, Daily  amitriptyline, 50 mg, Oral, Nightly  amLODIPine, 2.5 mg, Oral, Daily  apixaban, 5 mg, Oral, BID  hydroxyurea, 500 mg, Oral, Every Other Day  losartan, 50 mg, Oral, Daily  oxybutynin XL, 5 mg, Oral, Daily  pantoprazole, 40 mg, Oral, Q AM  remdesivir, 100 mg,  Intravenous, Q24H  rosuvastatin, 20 mg, Oral, Daily  sodium chloride, 10 mL, Intravenous, Q12H    Infusions   Diet  Diet: Cardiac Diets; Healthy Heart (2-3 Na+); Texture: Regular Texture (IDDSI 7); Fluid Consistency: Thin (IDDSI 0)         I have personally reviewed:  [x]  Laboratory   []  Microbiology   []  Radiology   [x]  EKG/Telemetry   []  Cardiology/Vascular   []  Pathology   []  Records    Assessment/Plan     Active Hospital Problems    Diagnosis  POA    **COVID-19 [U07.1]  Yes    Polycythemia [D75.1]  Yes    Chronic anticoagulation [Z79.01]  Not Applicable    PAD (peripheral artery disease) [I73.9]  Yes    Stenosis of carotid artery [I65.29]  Yes    S/P ablation of atrial flutter [Z98.890, Z86.79]  Not Applicable    S/P CABG (coronary artery bypass graft) [Z95.1]  Not Applicable    Coronary artery disease due to lipid rich plaque [I25.10, I25.83]  Yes    HLD (hyperlipidemia) [E78.5]  Yes    Benign essential hypertension [I10]  Yes    Cerebrovascular accident [I63.9]  Yes      Resolved Hospital Problems   No resolved problems to display.     Mr. Santoro is a 77 y.o. former smoker with a history of HTN, a flutter, CVA, HLD, CAD w/ h/o CABG, polycythemia vera, gout, chronic pain/opioid dependence who presents with profound weakness/mechanical fall and was found to have COVID-19 infection.      Symptomatic, non-hypoxic COVID 19 infection  - pt is non-hypoxic, defer steroids  - symptoms started ~7 days ago, out of window for paxlovid  - start remdesivir, monitor LFTs, will add mucinex and hycodan for symptoms   - LFTs increased slightly, monitor on CMP tomorrow  - add hycodan, mucinex, d/w wife- will attempt to order cepacol lozenge for sore throat but she can also bring these from home if our pharmacy still has them on backorder  - PT/OT for weakness- wife reports he was raking leaves as recently as the weekend     Hypertension  CAD with history of CABG  Atrial flutter with history of ablation  - continue  apixaban  - BP labile but mostly normal, hold home anti-hypertensive regimen while he is acutely ill and add back as needed     History of L MCA CVA  Hyperlipidemia  - continue crestor/Eliqius     Chronic pain syndrome/neuropathic pain  - continue Norco and Elavil     Gout  - continue allopurinol     Polycythemia vera  - continue hydrea QOD  - most recent CBC note reviewed    Eliquis (home med) for DVT prophylaxis.  Full code.  Discussed with patient, spouse, and nursing staff.  Anticipate discharge home timing yet to be determined.      Jewell Rios MD  [facility]  10/24/23  15:05 EDT    I wore protective equipment throughout this patient encounter including gloves.  Hand hygiene was performed before donning protective equipment and after removal when leaving the room.    Expected Discharge Date and Time       Expected Discharge Date Expected Discharge Time    Oct 26, 2023              Copied text in this note has been reviewed and is accurate as of 10/24/23.

## 2023-10-24 NOTE — PLAN OF CARE
Goal Outcome Evaluation:  Plan of Care Reviewed With: patient        Progress: no change       Patient alert and oriented on room air. Rested all shift. Tolerated remdesivir.

## 2023-10-24 NOTE — PLAN OF CARE
Goal Outcome Evaluation:  Plan of Care Reviewed With: patient           Outcome Evaluation: Pt is a 76 yo M who as admitted with fall and generalized weakness. Pt found to have COVID. Pt presents to PT with impaired functional mobility and gait secondary to generalized weakness, impaired balance, and decreased activity tolerance. Pt may benefit from skilled PT to address strength, mobility, and gait.      Anticipated Discharge Disposition (PT): skilled nursing facility

## 2023-10-24 NOTE — THERAPY EVALUATION
Patient Name: Madhu Santoro  : 1945    MRN: [id number]                              Today's Date: 10/24/2023       Admit Date: 10/23/2023    Visit Dx:     ICD-10-CM ICD-9-CM   1. COVID-19  U07.1 079.89   2. Generalized weakness  R53.1 780.79   3. Fall at home, initial encounter  W19.XXXA E888.9    Y92.009 E849.0   4. Abrasion of left ear, initial encounter  S00.412A 910.0   5. Elevated troponin  R79.89 790.6     Patient Active Problem List   Diagnosis    Anxiety    Arthritis    Coronary artery disease due to lipid rich plaque    HLD (hyperlipidemia)    Benign essential hypertension    DDD (degenerative disc disease), lumbosacral    Cerebrovascular accident    Encounter for screening for cardiovascular disorders    Gastroesophageal reflux disease    Hyperlipidemia    Stenosis of carotid artery    Former smoker    History of cardiac catheterization    S/P ablation of atrial flutter    Typical atrial flutter    S/P CABG (coronary artery bypass graft)    Adenomatous polyp of ascending colon    Abdominal bloating    Stroke    PAD (peripheral artery disease)    Acute cholecystitis    Right upper quadrant abdominal pain    Chronic pain of both hips    Chronic bilateral low back pain without sciatica    Sacroiliac joint dysfunction of both sides    Encounter for long-term (current) use of high-risk medication    Lumbar facet arthropathy    Stroke-like symptoms    CVA (cerebral vascular accident)    Personal history of colonic polyps    Arthralgia of multiple joints    Iron deficiency    Thrombocytosis    Left ureteral stone    Obstructive uropathy    Chronic anticoagulation    Facial skin lesion    Iron deficiency anemia    Polycythemia    Neuropathy    Weakness    Pneumonia    Close exposure to COVID-19 virus    Polycythemia vera    Chronic gout without tophus    COVID-19     Past Medical History:   Diagnosis Date    Anxiety     Arthritis     Atrial flutter     Status post cavotricuspid isthmus ablation by   "[facility] on 4/18/17    Mandel esophagus     Benign essential hypertension     CAD (coronary artery disease)     3 vessel CABG 4/11/17 by Dr. Cortez: ROSARIO-prox LAD, SVG-OM1, SVG-OM3    Carotid artery disease     Status post carotid endarterectomy - USG 4/10/17: 50-59% NICHELLE, 1-15% LICA.     Colonic polyp     Cyst of pancreas     DDD (degenerative disc disease), lumbosacral     GERD (gastroesophageal reflux disease)     H/O bone density study 2013    H/O complete eye exam 2014    History of kidney stone     HLD (hyperlipidemia)     Hypertension     Kidney stone     8/22/22    Lipid screening 05/31/2013    Low back pain     physical therapy [facility] 5-12-10    Screening for prostate cancer 07/07/2015    Skin cancer     nose    Stroke     RESIDUAL--\"BALANCE ISSUES\"     Past Surgical History:   Procedure Laterality Date    CARDIAC CATHETERIZATION N/A 04/10/2017    Procedure: Left Heart Cath;  Surgeon: Marjorie Healy MD;  Location:  [facility] C[facility] INVASIVE [facility];  Service:     CARDIAC CATHETERIZATION N/A 04/10/2017    Procedure: Coronary angiography;  Surgeon: Marjorie Healy MD;  Location:  [facility];  Service:     CARDIAC CATHETERIZATION N/A 04/10/2017    Procedure: Left ventriculography;  Surgeon: Marjorie Healy MD;  [facility];  Service:     CARDIAC CATHETERIZATION  2011    CARDIAC ELECTROPHYSIOLOGY PROCEDURE N/A 04/18/2017    Procedure: Ablation atrial flutter;  Surgeon: Jose Antonio Gustafson MD;  Location:  [facility] CATH [facility]VASIVE LOCATION;  Service:     CAROTID ENDARTERECTOMY      CHOLECYSTECTOMY      CHOLECYSTECTOMY WITH INTRAOPERATIVE CHOLANGIOGRAM N/A 09/07/2019    Procedure: Laparoscopic cholecystectomy with intraoperative cholangiogram;  Surgeon: Gauri Travis MD;  Location: [facility] MAIN OR;  Service: General    COLONOSCOPY  01/06/2015    Diverticulosis, one TA    COLONOSCOPY N/A 02/14/2019    tics, NBIH, adenomatous polyp x 2    COLONOSCOPY N/A 11/05/2021    " Procedure: COLONOSCOPY TO CECUM AND TERM. ILEUM WITH COLD POLYPECTOMIES;  Surgeon: Everton Abel MD;  Location: [facility] ENDOSCOPY;  Service: Gastroenterology;  Laterality: N/A;  PRE OP - PERS H/O POLYPS  POST OP - COLON POLYPS,, DIVERTICULOSIS, HEMORRHOIDS    CORONARY ARTERY BYPASS GRAFT N/A 04/11/2017    Procedure: AR STERNOTOMY CORONARY ARTERY BYPASS GRAFT TIMES 3 USING LEFT INTERNAL MAMMARY ARTERY AND LEFT GREATER SAPHENOUS VEIN GRAFT PER ENDOSCOPIC VEIN HARVESTING AND PRP ;  Surgeon: Temo Cortez MD;  Location: [facility] MAIN OR;  Service:     ENDOSCOPY  01/06/2015    HH, Ervin's esophagus    ENDOSCOPY N/A 02/14/2019    Z line irregular, HH, Ervin's esophagus    ENDOSCOPY N/A 11/05/2021    Procedure: ESOPHAGOGASTRODUODENOSCOPY WITH BIOPSIES;  Surgeon: Everton Abel MD;  Location: [facility] ENDOSCOPY;  Service: Gastroenterology;  Laterality: N/A;  PRE OP - PERS H/O ERVIN'S  POST OP - IRREG Z LINE    KNEE SURGERY Left     URETEROSCOPY LASER LITHOTRIPSY WITH STENT INSERTION Left 8/23/2022    Procedure: Cysto retrograde with left uretro stent placement;  Surgeon: Damien Oliveira MD;  Location: [facility] MAIN OR;  Service: Urology;  Laterality: Left;    VASECTOMY        General Information       Row Name 10/24/23 1148          Physical Therapy Time and Intention    Document Type evaluation  -     Mode of Treatment individual therapy;physical therapy  -       Row Name 10/24/23 1148          General Information    Prior Level of Function independent:;gait;transfer;bed mobility  no AD at baseline  -     Existing Precautions/Restrictions fall  -     Barriers to Rehab medically complex  -       Row Name 10/24/23 1148          Living Environment    People in Home spouse  -       Row Name 10/24/23 1148          Cognition    Orientation Status (Cognition) oriented to;person  pt seems very lethargic and is slow to respond/follow commands  -       Row Name 10/24/23 1148          Safety Issues,  Functional Mobility    Safety Issues Affecting Function (Mobility) ability to follow commands;at risk behavior observed;awareness of need for assistance;insight into deficits/self-awareness;judgment;safety precautions follow-through/compliance;safety precaution awareness  -     Impairments Affecting Function (Mobility) balance;cognition;coordination;endurance/activity tolerance;motor planning;postural/trunk control;strength  -               User Key  (r) = Recorded By, (t) = Taken By, (c) = Cosigned By      Initials Name Provider Type     Blanca Elkins PT Physical Therapist                   Mobility       Row Name 10/24/23 1524          Bed Mobility    Bed Mobility supine-sit;sit-supine  -     Supine-Sit [place] (Bed Mobility) verbal cues;nonverbal cues (demo/gesture);maximum assist (25% patient effort)  -     Sit-Supine [place] (Bed Mobility) verbal cues;nonverbal cues (demo/gesture);maximum assist (25% patient effort)  -     Assistive Device (Bed Mobility) bed rails;head of bed elevated  -       Row Name 10/24/23 1524          Transfers    Comment, (Transfers) performed sit to stand x3  -       Row Name 10/24/23 1524          Sit-Stand Transfer    Sit-Stand [place] (Transfers) verbal cues;nonverbal cues (demo/gesture);maximum assist (25% patient effort)  -     Assistive Device (Sit-Stand Transfers) --  HHA  -     Comment, (Sit-Stand Transfer) cues to stand tall and look up, attempted side steps but patient unable to weight shift  -               User Key  (r) = Recorded By, (t) = Taken By, (c) = Cosigned By      Initials Name Provider Type     Blanca Elkins PT Physical Therapist                   Obj/Interventions       Row Name 10/24/23 1526          Range of Motion Comprehensive    General Range of Motion no range of motion deficits identified  -       Row Name 10/24/23 1526          Strength Comprehensive (MMT)    Comment, General Manual Muscle Testing (MMT)  Assessment generalized weakness noted with functional mobility, B LE grossly 3-/5  -CH       Row Name 10/24/23 1526          Motor Skills    Therapeutic Exercise --  5 reps B LE AP, LAQ, and seated marches  -       Row Name 10/24/23 1526          Balance    Balance Assessment standing static balance;standing dynamic balance  -     Static Standing Balance verbal cues;non-verbal cues (demo/gesture);maximum assist  -CH     Dynamic Standing Balance verbal cues;non-verbal cues (demo/gesture);maximum assist  -CH     Position/Device Used, Standing Balance --  HHa  -               User Key  (r) = Recorded By, (t) = Taken By, (c) = Cosigned By      Initials Name Provider Type     Blanca Elkins, PT Physical Therapist                   Goals/Plan       Row Name 10/24/23 1529          Bed Mobility Goal 1 (PT)    Activity/Assistive Device (Bed Mobility Goal 1, PT) bed mobility activities, all  -CH     [place] Level/Cues Needed (Bed Mobility Goal 1, PT) supervision required  -CH     Time Frame (Bed Mobility Goal 1, PT) 1 week  -       Row Name 10/24/23 1529          Transfer Goal 1 (PT)    Activity/Assistive Device (Transfer Goal 1, PT) transfers, all;walker, rolling  -CH     [place] Level/Cues Needed (Transfer Goal 1, PT) supervision required  -CH     Time Frame (Transfer Goal 1, PT) 1 week  -       Row Name 10/24/23 1529          Gait Training Goal 1 (PT)    Activity/Assistive Device (Gait Training Goal 1, PT) gait (walking locomotion);walker, rolling  -CH     [place] Level (Gait Training Goal 1, PT) supervision required  -     Distance (Gait Training Goal 1, PT) 50  -CH     Time Frame (Gait Training Goal 1, PT) 1 week  -       Row Name 10/24/23 1529          Therapy Assessment/Plan (PT)    Planned Therapy Interventions (PT) balance training;bed mobility training;gait training;home exercise program;patient/family education;strengthening;transfer training  -               User Key  (r) =  Recorded By, (t) = Taken By, (c) = Cosigned By      Initials Name Provider Type    Blanca Fonseca, PT Physical Therapist                   Clinical Impression       Row Name 10/24/23 1527          Pain    Pretreatment Pain Rating 0/10 - no pain  -     Posttreatment Pain Rating 0/10 - no pain  -       Row Name 10/24/23 1527          Plan of Care Review    Plan of Care Reviewed With patient  -     Outcome Evaluation Pt is a 78 yo M who as admitted with fall and generalized weakness. Pt found to have COVID. Pt presents to PT with impaired functional mobility and gait secondary to generalized weakness, impaired balance, and decreased activity tolerance. Pt may benefit from skilled PT to address strength, mobility, and gait.  -       Row Name 10/24/23 1527          Therapy Assessment/Plan (PT)    Patient/Family Therapy Goals Statement (PT) to return to OF  -     Rehab Potential (PT) good, to achieve stated therapy goals  -     Criteria for Skilled Interventions Met (PT) skilled treatment is necessary  -     Therapy Frequency (PT) 3 times/wk  -       Row Name 10/24/23 1527          Positioning and Restraints    Pre-Treatment Position in bed  -     Post Treatment Position bed  -     In Bed supine;call light within reach;encouraged to call for assist;exit alarm on  -               User Key  (r) = Recorded By, (t) = Taken By, (c) = Cosigned By      Initials Name Provider Type    Blanca Fonseca, PT Physical Therapist                   Outcome Measures       Row Name 10/24/23 1535 10/24/23 0819       How much help from another person do you currently need...    Turning from your back to your side while in flat bed without using bedrails? 2  -CH 4  -KK    Moving from lying on back to sitting on the side of a flat bed without bedrails? 2  -CH 3  -KK    Moving to and from a bed to a chair (including a wheelchair)? 2  -CH 3  -KK    Standing up from a chair using your arms (e.g., wheelchair,  bedside chair)? 2  - 3  -KK    Climbing 3-5 steps with a railing? 1  - 2  -KK    To walk in hospital room? 1  - 3  -KK    AM-PAC 6 Clicks Score (PT) 10  - 18  -KK    Highest level of mobility 4 --> Transferred to chair/commode  - 6 --> Walked 10 steps or more  -      Row Name 10/24/23 1535          Functional Assessment    Outcome Measure Options AM-PAC 6 Clicks Basic Mobility (PT)  -               User Key  (r) = Recorded By, (t) = Taken By, (c) = Cosigned By      Initials Name Provider Type     Blanca Elknis, PT Physical Therapist    Licha Jeffery RN Registered Nurse                                 Physical Therapy Education       Title: PT OT SLP Therapies (Done)       Topic: Physical Therapy (Done)       Point: Mobility training (Done)       Learning Progress Summary             Patient Acceptance, E,TB,D, VU,NR by  at 10/24/2023 1535                         Point: Home exercise program (Done)       Learning Progress Summary             Patient Acceptance, E,TB,D, VU,NR by  at 10/24/2023 1535                         Point: Body mechanics (Done)       Learning Progress Summary             Patient Acceptance, E,TB,D, VU,NR by  at 10/24/2023 1535                         Point: Precautions (Done)       Learning Progress Summary             Patient Acceptance, E,TB,D, VU,NR by  at 10/24/2023 1535                                         User Key       Initials Effective Dates Name Provider Type [facility] 06/16/21 -  Blanca Elkins, PT Physical Therapist PT                  PT Recommendation and Plan  Planned Therapy Interventions (PT): balance training, bed mobility training, gait training, home exercise program, patient/family education, strengthening, transfer training  Plan of Care Reviewed With: patient  Outcome Evaluation: Pt is a 76 yo M who as admitted with fall and generalized weakness. Pt found to have COVID. Pt presents to PT with impaired functional mobility  and gait secondary to generalized weakness, impaired balance, and decreased activity tolerance. Pt may benefit from skilled PT to address strength, mobility, and gait.     Time Calculation:         PT Charges       Row Name 10/24/23 1536             Time Calculation    Start Time 1044  -      Stop Time 1100  -CH      Time Calculation (min) 16 min  -CH      PT Received On 10/24/23  -      PT - Next Appointment 10/25/23  -      PT Goal Re-Cert Due Date 10/31/23  -         Time Calculation- PT    Total Timed Code Minutes- PT 10 minute(s)  -CH         Timed Charges    24597 - PT Therapeutic Activity Minutes 10  -CH         Total Minutes    Timed Charges Total Minutes 10  -CH       Total Minutes 10  -CH                User Key  (r) = Recorded By, (t) = Taken By, (c) = Cosigned By      Initials Name Provider Type     Blanca Elkins, PT Physical Therapist                  Therapy Charges for Today       Code Description Service Date Service Provider Modifiers Qty    [id number]  PT THERAPEUTIC ACT EA 15 MIN 10/24/2023 Blanca Elkins, PT GP 1    [id number]  PT EVAL MOD COMPLEXITY 2 10/24/2023 Blanca Elkins, PT GP 1            PT G-Codes  Outcome Measure Options: AM-PAC 6 Clicks Basic Mobility (PT)  AM-PAC 6 Clicks Score (PT): 10  PT Discharge Summary  Anticipated Discharge Disposition (PT): skilled nursing facility    Blanca Elkins, SHANELL  10/24/2023

## 2023-10-24 NOTE — CASE MANAGEMENT/SOCIAL WORK
Discharge Planning Assessment  [facility]     Patient Name: Madhu Santoro  MRN: [id number]  Today's Date: 10/24/2023    Admit Date: 10/23/2023    Plan: [facility]   Discharge Needs Assessment       Row Name 10/24/23 1653       Living Environment    People in Home spouse    Current Living Arrangements home    Potentially Unsafe Housing Conditions none    Primary Care Provided by self    Provides Primary Care For no one    Family Caregiver if Needed spouse    Quality of Family Relationships helpful;involved;supportive    Able to Return to Prior Arrangements yes       Resource/Environmental Concerns    Resource/Environmental Concerns home accessibility    Home Accessibility Concerns stairs to enter home    Transportation Concerns none       Transition Planning    Patient/Family Anticipates Transition to home with family;inpatient rehabilitation facility    Patient/Family Anticipated Services at Transition home health care;skilled nursing;    Transportation Anticipated car, drives self;family or friend will provide       Discharge Needs Assessment    Readmission Within the Last 30 Days no previous admission in last 30 days    Equipment Currently Used at Home none    Concerns to be Addressed decision-making;discharge planning;adjustment to diagnosis/illness    Anticipated Changes Related to Illness inability to care for self    Equipment Needed After Discharge other (see comments)    Discharge Facility/Level of Care Needs home with home health;nursing facility, skilled    Current Discharge Risk dependent with mobility/activities of daily living                   Discharge Plan       Row Name 10/24/23 165       Plan    Plan SNF referral to [facility]    Roadmap to Recovery Yes    Patient/Family in Agreement with Plan yes    Provided Post Acute Provider List? Yes    Post Acute Provider List Nursing Home    Provided  Post Acute Provider Quality & Resource List? Yes    Post Acute Provider Quality and Resource List Nursing Home    Delivered To Support Person    Method of Delivery Telephone    Plan Comments CCP received inbound call from pts wife Brooke. Introduced self and explained CCP role. Reviewed Observation status. Verified facesheet and local pharmacy is [facility]. She denies problems affording medications. Pt enrolled in meds to beds. Pts living will on file. PCP is Dr. Damian Sanchez. Pt lives at home with wife in a home with 1 step. Prior to admission pt was IADL with mobility and driving. Pt uses no DME and has no hx of SNF or HH. CCP discussed dc planning, explained HH vs SNF services. Explained if SNF needed it would be private pay for room and board and they could bill Medicare B for therapies. Wife verbalized understanding. CCP explained will continue to follow for dc needs, pending on hospital course. She would like CCP to leave list in pts room for her to review, but would like CCP to make referral to [facility]. Danelle RN/CCP      Row Name 10/24/23 7884       Plan    Plan Comments CCP called into pts room, no answer. CCP called pts wife Brooke [phone] and left hippa compliant vm requesting call back. Reviewed PT notes and pt weak and anticipate SNF at dc. Await wife's call back. Danelle RN/CCP                  Continued Care and Services - Admitted Since 10/23/2023    Coordination has not been started for this encounter.       Selected Continued Care - Episodes Includes continued care and service providers with selected services from the active episodes listed below      Oncology- External Fill Episode start date: 5/16/2023   There are no active outsourced providers for this episode.                 Expected Discharge Date and Time       Expected Discharge Date Expected Discharge Time    Oct 26, 2023            Demographic Summary       Row Name 10/24/23 3854       General Information    Admission  Type observation    Required Notices Provided other (see comments)    Expected Length of Stay (LOS) reviewed observation status    Referral Source admission list    Reason for Consult discharge planning    Preferred Language English       Contact Information    Permission Granted to Share Info With family/designee;facility                    Functional Status       Row Name 10/24/23 9588       Functional Status    Usual Activity Tolerance good    Current Activity Tolerance poor       Functional Status, IADL    Medications independent    Meal Preparation independent    Housekeeping independent    Shopping independent       Mental Status    General Appearance WDL WDL       Mental Status Summary    Recent Changes in Mental Status/Cognitive Functioning unable to assess    Mental Status Comments unable to answer the phone       Employment/    Employment Status retired                   Psychosocial    No documentation.                  Abuse/Neglect    No documentation.                  Legal    No documentation.                  Substance Abuse    No documentation.                  Patient Forms    No documentation.                     Monica Russell RN

## 2023-10-25 ENCOUNTER — APPOINTMENT (OUTPATIENT)
Dept: GENERAL RADIOLOGY | Facility: HOSPITAL | Age: 78
End: 2023-10-25
Payer: MEDICARE

## 2023-10-25 LAB
ALBUMIN SERPL-MCNC: 3.5 G/DL (ref 3.5–5.2)
ALBUMIN/GLOB SERPL: 1.3 G/DL
ALP SERPL-CCNC: 230 U/L (ref 39–117)
ALT SERPL W P-5'-P-CCNC: 86 U/L (ref 1–41)
ANION GAP SERPL CALCULATED.3IONS-SCNC: 14.7 MMOL/L (ref 5–15)
ANISOCYTOSIS BLD QL: ABNORMAL
ARTERIAL PATENCY WRIST A: POSITIVE
AST SERPL-CCNC: 194 U/L (ref 1–40)
ATMOSPHERIC PRESS: 752 MMHG
BASE EXCESS BLDA CALC-SCNC: 0.2 MMOL/L (ref 0–2)
BDY SITE: ABNORMAL
BILIRUB SERPL-MCNC: 1.2 MG/DL (ref 0–1.2)
BUN SERPL-MCNC: 21 MG/DL (ref 8–23)
BUN/CREAT SERPL: 20 (ref 7–25)
CALCIUM SPEC-SCNC: 8.4 MG/DL (ref 8.6–10.5)
CHLORIDE SERPL-SCNC: 97 MMOL/L (ref 98–107)
CO2 BLDA-SCNC: 22.2 MMOL/L (ref 23–27)
CO2 SERPL-SCNC: 18.3 MMOL/L (ref 22–29)
CREAT SERPL-MCNC: 1.05 MG/DL (ref 0.76–1.27)
D-LACTATE SERPL-SCNC: 1.9 MMOL/L (ref 0.5–2)
D-LACTATE SERPL-SCNC: 3.4 MMOL/L (ref 0.5–2)
D-LACTATE SERPL-SCNC: 4.4 MMOL/L (ref 0.5–2)
D-LACTATE SERPL-SCNC: 4.5 MMOL/L (ref 0.5–2)
DEPRECATED RDW RBC AUTO: 49.3 FL (ref 37–54)
DEVICE COMMENT: ABNORMAL
EGFRCR SERPLBLD CKD-EPI 2021: 72.7 ML/MIN/1.73
ERYTHROCYTE [DISTWIDTH] IN BLOOD BY AUTOMATED COUNT: 12.5 % (ref 12.3–15.4)
GAS FLOW AIRWAY: 2.5 LPM
GLOBULIN UR ELPH-MCNC: 2.8 GM/DL
GLUCOSE BLDC GLUCOMTR-MCNC: 118 MG/DL (ref 70–130)
GLUCOSE BLDC GLUCOMTR-MCNC: 173 MG/DL (ref 70–130)
GLUCOSE SERPL-MCNC: 124 MG/DL (ref 65–99)
HCO3 BLDA-SCNC: 21.4 MMOL/L (ref 22–28)
HCT VFR BLD AUTO: 38 % (ref 37.5–51)
HEMODILUTION: NO
HGB BLD-MCNC: 13.2 G/DL (ref 13–17.7)
LYMPHOCYTES # BLD MANUAL: 0.2 10*3/MM3 (ref 0.7–3.1)
LYMPHOCYTES NFR BLD MANUAL: 3 % (ref 5–12)
MACROCYTES BLD QL SMEAR: ABNORMAL
MAGNESIUM SERPL-MCNC: 1.7 MG/DL (ref 1.6–2.4)
MCH RBC QN AUTO: 38 PG (ref 26.6–33)
MCHC RBC AUTO-ENTMCNC: 34.7 G/DL (ref 31.5–35.7)
MCV RBC AUTO: 109.5 FL (ref 79–97)
MODALITY: ABNORMAL
MONOCYTES # BLD: 0.12 10*3/MM3 (ref 0.1–0.9)
MRSA DNA SPEC QL NAA+PROBE: NORMAL
NEUTROPHILS # BLD AUTO: 3.67 10*3/MM3 (ref 1.7–7)
NEUTROPHILS NFR BLD MANUAL: 91.9 % (ref 42.7–76)
PCO2 BLDA: 26 MM HG (ref 35–45)
PH BLDA: 7.52 PH UNITS (ref 7.35–7.45)
PHOSPHATE SERPL-MCNC: 1.5 MG/DL (ref 2.5–4.5)
PHOSPHATE SERPL-MCNC: 2.1 MG/DL (ref 2.5–4.5)
PLAT MORPH BLD: NORMAL
PLATELET # BLD AUTO: 133 10*3/MM3 (ref 140–450)
PMV BLD AUTO: 10.9 FL (ref 6–12)
PO2 BLDA: 61 MM HG (ref 80–100)
POLYCHROMASIA BLD QL SMEAR: ABNORMAL
POTASSIUM SERPL-SCNC: 3.8 MMOL/L (ref 3.5–5.2)
PROCALCITONIN SERPL-MCNC: 3.88 NG/ML (ref 0–0.25)
PROT SERPL-MCNC: 6.3 G/DL (ref 6–8.5)
RBC # BLD AUTO: 3.47 10*6/MM3 (ref 4.14–5.8)
SAO2 % BLDCOA: 93.9 % (ref 92–98.5)
SODIUM SERPL-SCNC: 130 MMOL/L (ref 136–145)
TOTAL RATE: 20 BREATHS/MINUTE
VARIANT LYMPHS NFR BLD MANUAL: 5.1 % (ref 19.6–45.3)
VIT B1 BLD-SCNC: 131 NMOL/L (ref 66.5–200)
WBC MORPH BLD: NORMAL
WBC NRBC COR # BLD: 3.99 10*3/MM3 (ref 3.4–10.8)

## 2023-10-25 PROCEDURE — 94761 N-INVAS EAR/PLS OXIMETRY MLT: CPT

## 2023-10-25 PROCEDURE — 80053 COMPREHEN METABOLIC PANEL: CPT | Performed by: STUDENT IN AN ORGANIZED HEALTH CARE EDUCATION/TRAINING PROGRAM

## 2023-10-25 PROCEDURE — 36600 WITHDRAWAL OF ARTERIAL BLOOD: CPT

## 2023-10-25 PROCEDURE — 36415 COLL VENOUS BLD VENIPUNCTURE: CPT | Performed by: STUDENT IN AN ORGANIZED HEALTH CARE EDUCATION/TRAINING PROGRAM

## 2023-10-25 PROCEDURE — 82948 REAGENT STRIP/BLOOD GLUCOSE: CPT

## 2023-10-25 PROCEDURE — 25810000003 SODIUM CHLORIDE 0.9 % SOLUTION: Performed by: STUDENT IN AN ORGANIZED HEALTH CARE EDUCATION/TRAINING PROGRAM

## 2023-10-25 PROCEDURE — 84145 PROCALCITONIN (PCT): CPT | Performed by: STUDENT IN AN ORGANIZED HEALTH CARE EDUCATION/TRAINING PROGRAM

## 2023-10-25 PROCEDURE — 84100 ASSAY OF PHOSPHORUS: CPT | Performed by: STUDENT IN AN ORGANIZED HEALTH CARE EDUCATION/TRAINING PROGRAM

## 2023-10-25 PROCEDURE — 87040 BLOOD CULTURE FOR BACTERIA: CPT | Performed by: NURSE PRACTITIONER

## 2023-10-25 PROCEDURE — 85025 COMPLETE CBC W/AUTO DIFF WBC: CPT | Performed by: STUDENT IN AN ORGANIZED HEALTH CARE EDUCATION/TRAINING PROGRAM

## 2023-10-25 PROCEDURE — 25010000002 CEFEPIME PER 500 MG: Performed by: STUDENT IN AN ORGANIZED HEALTH CARE EDUCATION/TRAINING PROGRAM

## 2023-10-25 PROCEDURE — 71045 X-RAY EXAM CHEST 1 VIEW: CPT

## 2023-10-25 PROCEDURE — 94799 UNLISTED PULMONARY SVC/PX: CPT

## 2023-10-25 PROCEDURE — 94760 N-INVAS EAR/PLS OXIMETRY 1: CPT

## 2023-10-25 PROCEDURE — 25810000003 SODIUM CHLORIDE 0.9 % SOLUTION 250 ML FLEX CONT: Performed by: STUDENT IN AN ORGANIZED HEALTH CARE EDUCATION/TRAINING PROGRAM

## 2023-10-25 PROCEDURE — 25010000002 DEXAMETHASONE PER 1 MG: Performed by: STUDENT IN AN ORGANIZED HEALTH CARE EDUCATION/TRAINING PROGRAM

## 2023-10-25 PROCEDURE — 85007 BL SMEAR W/DIFF WBC COUNT: CPT | Performed by: STUDENT IN AN ORGANIZED HEALTH CARE EDUCATION/TRAINING PROGRAM

## 2023-10-25 PROCEDURE — 83605 ASSAY OF LACTIC ACID: CPT | Performed by: STUDENT IN AN ORGANIZED HEALTH CARE EDUCATION/TRAINING PROGRAM

## 2023-10-25 PROCEDURE — 25810000003 SODIUM CHLORIDE 0.9 % SOLUTION: Performed by: NURSE PRACTITIONER

## 2023-10-25 PROCEDURE — 83735 ASSAY OF MAGNESIUM: CPT | Performed by: STUDENT IN AN ORGANIZED HEALTH CARE EDUCATION/TRAINING PROGRAM

## 2023-10-25 PROCEDURE — 87641 MR-STAPH DNA AMP PROBE: CPT | Performed by: STUDENT IN AN ORGANIZED HEALTH CARE EDUCATION/TRAINING PROGRAM

## 2023-10-25 PROCEDURE — 82803 BLOOD GASES ANY COMBINATION: CPT

## 2023-10-25 PROCEDURE — 25810000003 SODIUM CHLORIDE 0.9 % SOLUTION: Performed by: INTERNAL MEDICINE

## 2023-10-25 PROCEDURE — 25010000002 VANCOMYCIN 10 G RECONSTITUTED SOLUTION: Performed by: STUDENT IN AN ORGANIZED HEALTH CARE EDUCATION/TRAINING PROGRAM

## 2023-10-25 PROCEDURE — 25010000002 REMDESIVIR 100 MG/20ML SOLUTION 1 EACH VIAL: Performed by: STUDENT IN AN ORGANIZED HEALTH CARE EDUCATION/TRAINING PROGRAM

## 2023-10-25 RX ORDER — VANCOMYCIN/0.9 % SOD CHLORIDE 1.5G/250ML
20 PLASTIC BAG, INJECTION (ML) INTRAVENOUS EVERY 24 HOURS
Status: DISCONTINUED | OUTPATIENT
Start: 2023-10-25 | End: 2023-10-26

## 2023-10-25 RX ORDER — SODIUM CHLORIDE 9 MG/ML
100 INJECTION, SOLUTION INTRAVENOUS CONTINUOUS
Status: DISCONTINUED | OUTPATIENT
Start: 2023-10-25 | End: 2023-10-27

## 2023-10-25 RX ORDER — DEXAMETHASONE SODIUM PHOSPHATE 10 MG/ML
6 INJECTION INTRAMUSCULAR; INTRAVENOUS DAILY
Status: DISCONTINUED | OUTPATIENT
Start: 2023-10-25 | End: 2023-10-31

## 2023-10-25 RX ORDER — FENTANYL/ROPIVACAINE/NS/PF 2-625MCG/1
15 PLASTIC BAG, INJECTION (ML) EPIDURAL ONCE
Status: COMPLETED | OUTPATIENT
Start: 2023-10-25 | End: 2023-10-25

## 2023-10-25 RX ORDER — DEXAMETHASONE 6 MG/1
6 TABLET ORAL
Status: DISCONTINUED | OUTPATIENT
Start: 2023-10-25 | End: 2023-10-25

## 2023-10-25 RX ADMIN — ACETAMINOPHEN 650 MG: 325 TABLET, FILM COATED ORAL at 02:27

## 2023-10-25 RX ADMIN — SODIUM CHLORIDE 2000 ML: 9 INJECTION, SOLUTION INTRAVENOUS at 12:23

## 2023-10-25 RX ADMIN — SODIUM CHLORIDE 100 ML/HR: 9 INJECTION, SOLUTION INTRAVENOUS at 09:29

## 2023-10-25 RX ADMIN — DEXAMETHASONE SODIUM PHOSPHATE 6 MG: 10 INJECTION INTRAMUSCULAR; INTRAVENOUS at 09:29

## 2023-10-25 RX ADMIN — VANCOMYCIN HYDROCHLORIDE 1500 MG: 10 INJECTION, POWDER, LYOPHILIZED, FOR SOLUTION INTRAVENOUS at 10:48

## 2023-10-25 RX ADMIN — POTASSIUM PHOSPHATE, MONOBASIC POTASSIUM PHOSPHATE, DIBASIC 15 MMOL: 224; 236 INJECTION, SOLUTION, CONCENTRATE INTRAVENOUS at 15:30

## 2023-10-25 RX ADMIN — REMDESIVIR 100 MG: 100 INJECTION, POWDER, LYOPHILIZED, FOR SOLUTION INTRAVENOUS at 12:53

## 2023-10-25 RX ADMIN — GUAIFENESIN 600 MG: 600 TABLET, EXTENDED RELEASE ORAL at 21:14

## 2023-10-25 RX ADMIN — APIXABAN 5 MG: 5 TABLET, FILM COATED ORAL at 21:14

## 2023-10-25 RX ADMIN — CEFEPIME 2000 MG: 2 INJECTION, POWDER, FOR SOLUTION INTRAVENOUS at 17:23

## 2023-10-25 RX ADMIN — CEFEPIME 2000 MG: 2 INJECTION, POWDER, FOR SOLUTION INTRAVENOUS at 08:57

## 2023-10-25 RX ADMIN — AMITRIPTYLINE HYDROCHLORIDE 50 MG: 50 TABLET, FILM COATED ORAL at 23:42

## 2023-10-25 RX ADMIN — SODIUM CHLORIDE 500 ML: 9 INJECTION, SOLUTION INTRAVENOUS at 06:02

## 2023-10-25 RX ADMIN — Medication 10 ML: at 10:48

## 2023-10-25 RX ADMIN — Medication 10 ML: at 21:14

## 2023-10-25 NOTE — NURSING NOTE
Patients redraw lactic 4.5, call placed to Dr. Rios and notified. Patient requiring more oxygen 15 liters non rebreather. Consult placed for pulmonary, call placed to oncall awating a return call.

## 2023-10-25 NOTE — PROGRESS NOTES
"    Name: Madhu Santoro ADMIT: 10/23/2023   : 1945  PCP: Damian Sanchez MD    MRN: [id number] LOS: 0 days   AGE/SEX: 77 y.o. male  ROOM: [facility] (formerly [facility])     Subjective   Subjective   Overnight patient developed new oxygen requirement, febrile, tachycardic.  Blood cultures, chest x-ray, lactic obtained.  New/worsening infiltrates on chest x-ray, procal added on came back elevated at 3.8.  Patient states he feels \"okay\" but appears weaker/more lethargic.  I have called and updated the wife about his worsening clinical status.        Objective   Objective   Vital Signs  Temp:  [97.2 °F (36.2 °C)-102.2 °F (39 °C)] 99.8 °F (37.7 °C)  Heart Rate:  [] 99  Resp:  [18] 18  BP: (127-155)/(59-75) 134/59  SpO2:  [92 %-97 %] 97 %  on   ;   Device (Oxygen Therapy): nasal cannula  Body mass index is 26.43 kg/m².  Physical Exam  Constitutional:       General: He is not in acute distress.     Appearance: He is ill-appearing. He is not toxic-appearing.      Comments: Appears weaker, more lethargic today   Cardiovascular:      Rate and Rhythm: Tachycardia present. Rhythm irregular.   Pulmonary:      Effort: Pulmonary effort is normal. No respiratory distress.      Breath sounds: Rhonchi present. No wheezing.   Abdominal:      General: Bowel sounds are normal.      Palpations: Abdomen is soft.      Tenderness: There is no abdominal tenderness.   Musculoskeletal:         General: No tenderness.      Right lower leg: No edema.      Left lower leg: No edema.   Skin:     General: Skin is warm and dry.   Neurological:      General: No focal deficit present.      Mental Status: He is oriented to person, place, and time. Mental status is at baseline.      Motor: Weakness present.         Results Review     I reviewed the patient's new clinical results.  Results from last 7 days   Lab Units 10/25/23  0446 10/24/23  0443 10/23/23  0507 10/20/23  1326   WBC 10*3/mm3 3.99 4.18 8.40 8.04   HEMOGLOBIN g/dL 13.2 11.9* 13.1 13.1   PLATELETS " 10*3/mm3 133* 159 208 247     Results from last 7 days   Lab Units 10/25/23  0446 10/24/23  0443 10/23/23  0548   SODIUM mmol/L 130* 132* 136   POTASSIUM mmol/L 3.8 4.6 4.2   CHLORIDE mmol/L 97* 99 102   CO2 mmol/L 18.3* 21.5* 23.0   BUN mg/dL 21 15 17   CREATININE mg/dL 1.05 0.97 1.00   GLUCOSE mg/dL 124* 84 138*   Estimated Creatinine Clearance: 64.6 mL/min (by C-G formula based on SCr of 1.05 mg/dL).  Results from last 7 days   Lab Units 10/25/23  0446 10/24/23  0443 10/23/23  0548 10/20/23  1329   ALBUMIN g/dL 3.5 3.6 3.9 3.7   BILIRUBIN mg/dL 1.2 0.8 0.7  --    ALK PHOS U/L 230* 204* 191*  --    AST (SGOT) U/L 194* 97* 49*  --    ALT (SGPT) U/L 86* 55* 31  --      Results from last 7 days   Lab Units 10/25/23  0446 10/24/23  0443 10/23/23  0548 10/20/23  1329   CALCIUM mg/dL 8.4* 8.3* 9.0  --    ALBUMIN g/dL 3.5 3.6 3.9 3.7   MAGNESIUM mg/dL 1.7 2.0  --   --    PHOSPHORUS mg/dL 2.1* 2.1*  --   --      Results from last 7 days   Lab Units 10/25/23  1041 10/25/23  0732 10/25/23  0446 10/23/23  0507   PROCALCITONIN ng/mL  --   --  3.88*  --    LACTATE mmol/L 4.4* 4.5* 3.4* 1.7     COVID19   Date Value Ref Range Status   10/23/2023 Detected (C) Not Detected - Ref. Range Final   02/21/2023 Not Detected Not Detected - Ref. Range Final     Glucose   Date/Time Value Ref Range Status   10/25/2023 0336 118 70 - 130 mg/dL Final       XR Chest 1 View  Narrative: Portable chest x-ray     HISTORY: Increased oxygen need.     TECHNIQUE: Portable chest x-ray is provided and correlated with x-ray  October 23, 2023. This image was acquired and an exaggerated reverse  lordotic configuration which partly obscures visualization of the lung  bases.     FINDINGS: Sternal wires with new asymmetric density in the left mid and  lower lung zones. The visualized right lung appears clear. Vascular  volume is normal.     Impression: New opacity in the left lung is asymmetric and favored  represent pneumonia.     This report was finalized on  10/25/2023 8:13 AM by Dr. Jose Antonio Ortega M.D on Workstation: [id number]       Scheduled Medications  allopurinol, 300 mg, Oral, Daily  amitriptyline, 50 mg, Oral, Nightly  amLODIPine, 2.5 mg, Oral, Daily  apixaban, 5 mg, Oral, BID  cefepime, 2,000 mg, Intravenous, Q8H  dexAMETHasone, 6 mg, Intravenous, Daily  guaiFENesin, 600 mg, Oral, Q12H  hydroxyurea, 500 mg, Oral, Every Other Day  losartan, 50 mg, Oral, Daily  oxybutynin XL, 5 mg, Oral, Daily  pantoprazole, 40 mg, Oral, Q AM  remdesivir, 100 mg, Intravenous, Q24H  rosuvastatin, 20 mg, Oral, Daily  sodium chloride, 2,000 mL, Intravenous, Once  sodium chloride, 10 mL, Intravenous, Q12H  vancomycin, 20 mg/kg, Intravenous, Q24H    Infusions  Pharmacy to dose vancomycin,   sodium chloride, 100 mL/hr, Last Rate: 100 mL/hr (10/25/23 0929)    Diet  Diet: Cardiac Diets; Healthy Heart (2-3 Na+); Texture: Regular Texture (IDDSI 7); Fluid Consistency: Thin (IDDSI 0)         I have personally reviewed:  [x]  Laboratory   []  Microbiology   [x]  Radiology   [x]  EKG/Telemetry   []  Cardiology/Vascular   []  Pathology   []  Records    Assessment/Plan     Active Hospital Problems    Diagnosis  POA    **COVID-19 [U07.1]  Yes    Polycythemia [D75.1]  Yes    Chronic anticoagulation [Z79.01]  Not Applicable    PAD (peripheral artery disease) [I73.9]  Yes    Stenosis of carotid artery [I65.29]  Yes    S/P ablation of atrial flutter [Z98.890, Z86.79]  Not Applicable    S/P CABG (coronary artery bypass graft) [Z95.1]  Not Applicable    Coronary artery disease due to lipid rich plaque [I25.10, I25.83]  Yes    HLD (hyperlipidemia) [E78.5]  Yes    Benign essential hypertension [I10]  Yes    Cerebrovascular accident [I63.9]  Yes      Resolved Hospital Problems   No resolved problems to display.     Mr. Santoro is a 77 y.o. former smoker with a history of HTN, a flutter, CVA, HLD, CAD w/ h/o CABG, polycythemia vera, gout, chronic pain/opioid dependence who presents with profound  weakness/mechanical fall and was found to have COVID-19 infection.      Symptomatic, hypoxic COVID 19 infection  Superimposed bacterial pneumonia  - pt is non-hypoxic, defer steroids  - symptoms started ~7 days ago, out of window for paxlovid  - start remdesivir, monitor LFTs, will add mucinex and hycodan for symptoms   - LFTs increased slightly, monitor on CMP tomorrow  - add hycodan, mucinex, d/w wife- will attempt to order cepacol lozenge for sore throat but she can also bring these from home if our pharmacy still has them on backorder  - PT/OT for weakness- wife reports he was raking leaves as recently as the weekend    Patient clinically deteriorated overnight, procal elevated consistent with possible superimposed bacterial pneumonia, blood cultures obtained overnight; oxygen requirements have increased to 15 L via nonrebreather this morning.  Plan to start cefepime/vancomycin for possible superimposed bacterial pneumonia, obtain MRSA nares, ask pulmonology to see, start IVF for elevated lactic and start dexamethasone for new oxygen requirement in setting of COVID..     Hypertension  CAD with history of CABG  Atrial flutter with history of ablation  - continue apixaban  - BP labile but mostly normal, hold home anti-hypertensive regimen while he is acutely ill and add back as needed     History of L MCA CVA  Hyperlipidemia  - continue crestor/Eliqius     Chronic pain syndrome/neuropathic pain  - continue Norco and Elavil     Gout  - continue allopurinol     Polycythemia vera  - continue hydrea QOD  - most recent CBC note reviewed    Eliquis (home med) for DVT prophylaxis.  Full code.  Discussed with patient, spouse, and nursing staff.  Anticipate discharge home timing yet to be determined.      Jewell Rios MD  [facility]  10/25/23  11:13 EDT    I wore protective equipment throughout this patient encounter including gloves.  Hand hygiene was performed before donning protective  equipment and after removal when leaving the room.    Expected Discharge Date and Time       Expected Discharge Date Expected Discharge Time    Oct 26, 2023              Copied text in this note has been reviewed and is accurate as of 10/25/23.

## 2023-10-25 NOTE — PLAN OF CARE
Goal Outcome Evaluation:           Progress: improving  Outcome Evaluation: Transferred from floor for increasing O2 demand. Patient drowsy and answers questions appropriately. Tolerating optiflow 60LPM/40% FiO2. Vital satble. Afebrile. Plan of care explained to patient and wife. Will continue to monitor.

## 2023-10-25 NOTE — CONSULTS
Patient Care Team:  Damian Sanchez MD as PCP - General (Family Medicine)  Jr Temo Cortez MD as Surgeon (Cardiothoracic Surgery)  Netta Mayorga Jr., MD as Consulting Physician (Vascular Surgery)  Damian Sanchez MD as Referring Physician (Family Medicine)  Aquiles Lopez MD as Consulting Physician (Hematology and Oncology)  Christy Luther APRN as Nurse Practitioner (Nurse Practitioner)  Damian Sanchez MD as Referring Physician (Family Medicine)      Subjective     Patient is a 77 y.o. male.  Asked to see for worsening respiratory status patient presented to the emergency department on 10/20 3 in the morning patient presented with complaints of generalized weakness fall he did been on the floor for 3 to 4 hours and unable to get up he was found to be COVID-19 positive he has a history of prior CVA, coronary artery disease with prior coronary bypass grafting, a flutter, hypertension, polycythemia vera, gout chronic pain with opioid dependence.  Initially he was on room air until this morning he desaturated and is requiring high flow nasal cannula.  He has been febrile throughout his admission intermittently with a temp of 102.2 this morning  Patient is not a very good historian he cannot tell me much he is not really talking much, nursing says he has been that way all morning.    Review of Systems:  Limited due to patient not communicating       History  Past Medical History:   Diagnosis Date    Anxiety     Arthritis     Atrial flutter     Status post cavotricuspid isthmus ablation by Dr. Gustafson on 4/18/17    Mandel esophagus     Benign essential hypertension     CAD (coronary artery disease)     3 vessel CABG 4/11/17 by Dr. Cortez: ROSARIO-prox LAD, SVG-OM1, SVG-OM3    Carotid artery disease     Status post carotid endarterectomy - USG 4/10/17: 50-59% NICHELLE, 1-15% LICA.     Colonic polyp     Cyst of pancreas     DDD (degenerative disc disease), lumbosacral     GERD  "(gastroesophageal reflux disease)     H/O bone density study 2013    H/O complete eye exam 2014    History of kidney stone     HLD (hyperlipidemia)     Hypertension     Kidney stone     8/22/22    Lipid screening 05/31/2013    Low back pain     physical therapy [facility] 5-12-10    Screening for prostate cancer 07/07/2015    Skin cancer     nose    Stroke     RESIDUAL--\"BALANCE ISSUES\"     Past Surgical History:   Procedure Laterality Date    CARDIAC CATHETERIZATION N/A 04/10/2017    Procedure: Left Heart Cath;  Surgeon: Marjorie Healy MD;  Location: [facility] CATH INVASIVE LOCATION;  Service:     CARDIAC CATHETERIZATION N/A 04/10/2017    Procedure: Coronary angiography;  Surgeon: Marjorie Healy MD;  Location: [facility] CATH INVASIVE LOCATION;  Service:     CARDIAC CATHETERIZATION N/A 04/10/2017    Procedure: Left ventriculography;  Surgeon: Marjorie Healy MD;  Location: [facility] CATH INVASIVE LOCATION;  Service:     CARDIAC CATHETERIZATION  2011    CARDIAC ELECTROPHYSIOLOGY PROCEDURE N/A 04/18/2017    Procedure: Ablation atrial flutter;  Surgeon: Jose Antonio Gustafson MD;  Location: [facility] CATH INVASIVE LOCATION;  Service:     CAROTID ENDARTERECTOMY      CHOLECYSTECTOMY      CHOLECYSTECTOMY WITH INTRAOPERATIVE CHOLANGIOGRAM N/A 09/07/2019    Procedure: Laparoscopic cholecystectomy with intraoperative cholangiogram;  Surgeon: Gauri Travis MD;  Location: [facility] MAIN OR;  Service: General    COLONOSCOPY  01/06/2015    Diverticulosis, one TA    COLONOSCOPY N/A 02/14/2019    tics, NBIH, adenomatous polyp x 2    COLONOSCOPY N/A 11/05/2021    Procedure: COLONOSCOPY TO CECUM AND TERM. ILEUM WITH COLD POLYPECTOMIES;  Surgeon: Everton Abel MD;  Location: [facility] ENDOSCOPY;  Service: Gastroenterology;  Laterality: N/A;  PRE OP - PERS H/O POLYPS  POST OP - COLON POLYPS,, DIVERTICULOSIS, HEMORRHOIDS    CORONARY ARTERY BYPASS GRAFT N/A 04/11/2017    Procedure: AR STERNOTOMY CORONARY ARTERY BYPASS GRAFT TIMES 3 USING LEFT " INTERNAL MAMMARY ARTERY AND LEFT GREATER SAPHENOUS VEIN GRAFT PER ENDOSCOPIC VEIN HARVESTING AND PRP ;  Surgeon: Temo Cortez MD;  Location: [facility] MAIN OR;  Service:     ENDOSCOPY  2015    HH, Ervin's esophagus    ENDOSCOPY N/A 2019    Z line irregular, HH, Ervin's esophagus    ENDOSCOPY N/A 2021    Procedure: ESOPHAGOGASTRODUODENOSCOPY WITH BIOPSIES;  Surgeon: Everton Abel MD;  Location: [facility] ENDOSCOPY;  Service: Gastroenterology;  Laterality: N/A;  PRE OP - PERS H/O ERVIN'S  POST OP - IRREG Z LINE    KNEE SURGERY Left     URETEROSCOPY LASER LITHOTRIPSY WITH STENT INSERTION Left 2022    Procedure: Cysto retrograde with left uretro stent placement;  Surgeon: Damien Oliveira MD;  Location: [facility] MAIN OR;  Service: Urology;  Laterality: Left;    VASECTOMY       Social History     Socioeconomic History    Marital status:      Spouse name: Brooke    Years of education: 9th grade   Tobacco Use    Smoking status: Former     Packs/day: 1     Types: Cigarettes     Start date: 1959     Quit date: 2009     Years since quittin.8    Smokeless tobacco: Never    Tobacco comments:     CAFFEINE USE   Vaping Use    Vaping Use: Never used   Substance and Sexual Activity    Alcohol use: Not Currently     Comment: rarely    Drug use: No    Sexual activity: Defer     Partners: Female     Family History   Problem Relation Age of Onset    Hypertension Mother     Heart disease Mother     Heart attack Mother     Stroke Mother     Heart disease Father     Heart attack Father     Stroke Father     Hypertension Sister     Heart attack Brother     Heart disease Brother     No Known Problems Brother     Heart disease Brother     Heart attack Brother     Diabetes Brother     No Known Problems Maternal Grandmother     No Known Problems Maternal Grandfather     No Known Problems Paternal Grandmother     No Known Problems Paternal Grandfather     Pancreatic cancer Paternal Cousin      Arthritis Other     Diabetes Other     Hypertension Other     Kidney disease Other         stones    Malig Hyperthermia Neg Hx          Allergies:  Atorvastatin and Penicillins    Medications:  Prior to Admission medications    Medication Sig Start Date End Date Taking? Authorizing Provider   allopurinol (Zyloprim) 300 MG tablet Take 1 tablet by mouth Daily. 7/11/23  Yes Damian Sanchez MD   amitriptyline (ELAVIL) 50 MG tablet TAKE ONE TABLET BY MOUTH ONCE NIGHTLY 9/28/23  Yes Mukesh Russell II, MD   amLODIPine (NORVASC) 2.5 MG tablet Take 1 tablet by mouth Daily. 1/12/23  Yes Kate Ayala APRN   apixaban (ELIQUIS) 5 MG tablet tablet Take 1 tablet by mouth 2 (Two) Times a Day. 1/12/23  Yes Kate Ayala APRN   HYDROcodone-acetaminophen (NORCO)  MG per tablet Take 1 tablet by mouth Every 8 (Eight) Hours As Needed for Severe Pain. 30 day supply 10/12/23  Yes Marisol Perales APRN   hydroxyurea (HYDREA) 500 MG capsule Take 1 capsule by mouth Every Other Day. 9/22/23  Yes Aquiles Lopez MD   losartan (Cozaar) 50 MG tablet Take 1 tablet by mouth Daily. 10/4/23  Yes Damian Sanchez MD   Myrbetriq 25 MG tablet sustained-release 24 hour 24 hr tablet Take 1 tablet by mouth Daily. 10/5/20  Yes Gary Garcia MD   potassium chloride 10 MEQ CR tablet TAKE ONE TABLET BY MOUTH DAILY 4/4/23  Yes Damian Sanchez MD   RABEprazole (Aciphex) 20 MG EC tablet Take 1 tablet by mouth Daily. 10/4/23  Yes Damian Sanchez MD   rosuvastatin (Crestor) 20 MG tablet Take 1 tablet by mouth Daily. 7/11/23  Yes Damian Sanchez MD   furosemide (LASIX) 20 MG tablet TAKE ONE TABLET BY MOUTH DAILY 4/4/23   Damian Sanchez MD   HYDROcodone-acetaminophen (NORCO) 5-325 MG per tablet Take 2 tablets by mouth Every 8 (Eight) Hours As Needed for Severe Pain. 30 day supply. DNF 10/19/23 10/17/23   Marisol Perales APRN   Ibuprofen 3 %, Gabapentin 10 %, Baclofen 2 %, lidocaine 4 %, Ketamine HCl 4 % Apply 1-2 g topically to  "the appropriate area as directed 3 (Three) to 4 (Four) times daily. 10/12/23 10/11/24  Marisol Perales APRN   mupirocin (BACTROBAN) 2 % cream Apply 1 application topically to the appropriate area as directed 2 (Two) Times a Day. 7/18/22   Elmo Owens APRN     allopurinol, 300 mg, Oral, Daily  amitriptyline, 50 mg, Oral, Nightly  amLODIPine, 2.5 mg, Oral, Daily  apixaban, 5 mg, Oral, BID  cefepime, 2,000 mg, Intravenous, Q8H  dexAMETHasone, 6 mg, Intravenous, Daily  guaiFENesin, 600 mg, Oral, Q12H  hydroxyurea, 500 mg, Oral, Every Other Day  losartan, 50 mg, Oral, Daily  oxybutynin XL, 5 mg, Oral, Daily  pantoprazole, 40 mg, Oral, Q AM  remdesivir, 100 mg, Intravenous, Q24H  rosuvastatin, 20 mg, Oral, Daily  sodium chloride, 10 mL, Intravenous, Q12H  vancomycin, 20 mg/kg, Intravenous, Q24H      Pharmacy to dose vancomycin,   sodium chloride, 100 mL/hr, Last Rate: 100 mL/hr (10/25/23 0929)        Objective     Vital Signs  Vital Sign Min/Max for last 24 hours  Temp  Min: 97.2 °F (36.2 °C)  Max: 102.2 °F (39 °C)   BP  Min: 127/62  Max: 155/75   Pulse  Min: 97  Max: 126   Resp  Min: 18  Max: 18   SpO2  Min: 92 %  Max: 97 %   No data recorded   No data recorded       Intake/Output Summary (Last 24 hours) at 10/25/2023 1041  Last data filed at 10/24/2023 1700  Gross per 24 hour   Intake 220 ml   Output --   Net 220 ml     No intake/output data recorded.  Last Weight and Admission Weight        10/23/23  0536   Weight: 78.8 kg (173 lb 12.8 oz)     Flowsheet Rows      Flowsheet Row First Filed Value   Admission Height 172.7 cm (68\") Documented at 10/23/2023 0536   Admission Weight 78.8 kg (173 lb 12.8 oz) Documented at 10/23/2023 0536            Body mass index is 26.43 kg/m².           Physical Exam:  General Appearance: Elderly white male looks even older than his stated age disheveled looks chronically ill poorly kept and a looks acutely ill as well  Eyes: Conjunctiva are clear and anicteric pupils are " equal  ENT: Mucous membranes are very dry no erythema no exudates, nasal septum midline  Neck: No palpable adenopathy or thyromegaly no visible jugular venous distention or hepatojugular reflux, trachea midline  Lungs: He has coarse rales in the left base there are a few in the right base as well no wheezes, he is tachypneic respiratory rates about 33 he is on 100% nonrebreather facemask at 15 L oxygen saturations are 95 to 96%  Cardiac: Tachycardic heart rates in the 120s looks to be sinus on the monitor regular rhythm no murmur  Abdomen: Soft no palpable hepatosplenomegaly or masses  : Not examined  Musculoskeletal: Does not have a whole lot of muscle mass, he has a poststernotomy type scar but there is a large indentation in the lower half does not look like a normal healed wound it looks like he has had some is healing by secondary intention  Skin: Warm and dry no jaundice no diaphoresis no petechiae  Neuro: Patient does not say a whole lot is very difficult to get him to answer questions he does follow some simple commands see any gross focal neurologic deficits  Extremities/P Vascular: No clubbing no cyanosis no edema palpable radial and dorsalis pedis pulses  MSE: Very flat affect      Labs:  Results from last 7 days   Lab Units 10/25/23  0446 10/24/23  0443 10/23/23  0548 10/20/23  1329   GLUCOSE mg/dL 124* 84 138*  --    SODIUM mmol/L 130* 132* 136  --    POTASSIUM mmol/L 3.8 4.6 4.2  --    MAGNESIUM mg/dL 1.7 2.0  --   --    CO2 mmol/L 18.3* 21.5* 23.0  --    CHLORIDE mmol/L 97* 99 102  --    ANION GAP mmol/L 14.7 11.5 11.0  --    CREATININE mg/dL 1.05 0.97 1.00  --    BUN mg/dL 21 15 17  --    BUN / CREAT RATIO  20.0 15.5 17.0  --    CALCIUM mg/dL 8.4* 8.3* 9.0  --    ALK PHOS U/L 230* 204* 191*  --    TOTAL PROTEIN g/dL 6.3 6.3 6.6  --    ALT (SGPT) U/L 86* 55* 31  --    AST (SGOT) U/L 194* 97* 49*  --    BILIRUBIN mg/dL 1.2 0.8 0.7  --    ALBUMIN g/dL 3.5 3.6 3.9 3.7   GLOBULIN gm/dL 2.8 2.7 2.7  --     A/G RATIO   --   --   --  1.2     Estimated Creatinine Clearance: 64.6 mL/min (by C-G formula based on SCr of 1.05 mg/dL).      Results from last 7 days   Lab Units 10/25/23  0446 10/24/23  0443 10/23/23  0507 10/20/23  1326   WBC 10*3/mm3 3.99 4.18 8.40 8.04   RBC 10*6/mm3 3.47* 3.07* 3.41* 3.32*   HEMOGLOBIN g/dL 13.2 11.9* 13.1 13.1   HEMATOCRIT % 38.0 34.6* 38.9 40.0   MCV fL 109.5* 112.7* 114.1* 120.5*   MCH pg 38.0* 38.8* 38.4* 39.5*   MCHC g/dL 34.7 34.4 33.7 32.8   RDW % 12.5 12.6 12.9 12.8   RDW-SD fl 49.3 51.7 55.1* 57.7*   MPV fL 10.9 10.9 10.3 10.0   PLATELETS 10*3/mm3 133* 159 208 247   NEUTROPHIL % %  --   --   --  67.5   LYMPHOCYTE % %  --   --   --  17.3*   MONOCYTES % %  --   --   --  10.9   EOSINOPHIL % %  --   --   --  1.0   BASOPHIL % %  --   --   --  0.2   IMM GRAN % %  --   --   --  3.1*   NEUTROS ABS 10*3/mm3 3.67 2.98 7.47* 5.42   LYMPHS ABS 10*3/mm3  --   --   --  1.39   MONOS ABS 10*3/mm3  --   --   --  0.88   EOS ABS 10*3/mm3  --   --   --  0.08   BASOS ABS 10*3/mm3  --   --   --  0.02   IMMATURE GRANS (ABS) 10*3/mm3  --   --   --  0.25*   NRBC /100 WBC  --   --  0.0 0.0     Results from last 7 days   Lab Units 10/25/23  0412   PH, ARTERIAL pH units 7.523*   PO2 ART mm Hg 61.0*   PCO2, ARTERIAL mm Hg 26.0*   HCO3 ART mmol/L 21.4*     Results from last 7 days   Lab Units 10/23/23  0828 10/23/23  0548   CK TOTAL U/L  --  158   HSTROP T ng/L 29* 25*             Results from last 7 days   Lab Units 10/25/23  0732 10/25/23  0446 10/23/23  0507   LACTATE mmol/L 4.5* 3.4* 1.7   PROCALCITONIN ng/mL  --  3.88*  --          Microbiology Results (last 10 days)       Procedure Component Value - Date/Time    Respiratory Panel PCR w/COVID-19(SARS-CoV-2) DONTRELL/LESTER/KANCHAN/PAD/COR/MAD/BRIAN In-House, NP Swab in UTM/VTM, 3-4 HR TAT - Swab, Nasopharynx [[id number]]  (Abnormal) Collected: 10/23/23 0650    Lab Status: Final result Specimen: Swab from Nasopharynx Updated: 10/23/23 0809     ADENOVIRUS, PCR Not  Detected     Coronavirus 229E Not Detected     Coronavirus HKU1 Not Detected     Coronavirus NL63 Not Detected     Coronavirus OC43 Not Detected     COVID19 Detected     Human Metapneumovirus Not Detected     Human Rhinovirus/Enterovirus Not Detected     Influenza A PCR Not Detected     Influenza B PCR Not Detected     Parainfluenza Virus 1 Not Detected     Parainfluenza Virus 2 Not Detected     Parainfluenza Virus 3 Not Detected     Parainfluenza Virus 4 Not Detected     RSV, PCR Not Detected     Bordetella pertussis pcr Not Detected     Bordetella parapertussis PCR Not Detected     Chlamydophila pneumoniae PCR Not Detected     Mycoplasma pneumo by PCR Not Detected    Narrative:      In the setting of a positive respiratory panel with a viral infection PLUS a negative procalcitonin without other underlying concern for bacterial infection, consider observing off antibiotics or discontinuation of antibiotics and continue supportive care. If the respiratory panel is positive for atypical bacterial infection (Bordetella pertussis, Chlamydophila pneumoniae, or Mycoplasma pneumoniae), consider antibiotic de-escalation to target atypical bacterial infection.              Diagnostics:  XR Chest 1 View    Result Date: 10/25/2023  Portable chest x-ray  HISTORY: Increased oxygen need.  TECHNIQUE: Portable chest x-ray is provided and correlated with x-ray October 23, 2023. This image was acquired and an exaggerated reverse lordotic configuration which partly obscures visualization of the lung bases.  FINDINGS: Sternal wires with new asymmetric density in the left mid and lower lung zones. The visualized right lung appears clear. Vascular volume is normal.      New opacity in the left lung is asymmetric and favored represent pneumonia.  This report was finalized on 10/25/2023 8:13 AM by Dr. Jose Antonio Ortega M.D on Workstation: [id number]      CT Head Without Contrast    Result Date: 10/24/2023  EMERGENCY CT SCAN OF THE HEAD  WITHOUT CONTRAST ON 10/23/2023  CLINICAL HISTORY: Patient fell, left-sided head trauma, headache, patient is on anticoagulation.  TECHNIQUE: Spiral CT images were obtained from the base of the skull to the vertex without intravenous contrast. The images were reformatted and submitted in 3 mm thick axial, sagittal and coronal CT sections with brain algorithm and 2 mm thick axial CT sections with high-resolution bone algorithm.  This is correlated to prior head CT without contrast from [facility] on 02/19/2023 and a prior MRI of the brain on 03/16/2015.  FINDINGS: There is some patchy low-density extending from the periventricular into the subcortical white matter of the cerebral hemispheres consistent with mild-to-moderate small vessel disease. There is a small 3-4 mm old lacunar infarct in the inferior lateral right thalamus. There are tiny old lacunar infarcts in the anterior and mid left corona radiata region. There is a focal area of encephalomalacia involving the posterior superior left frontal lobe measuring 14 x 10 mm in size compatible with an old posterior superior left frontal infarct in the left MCA territory and this is unchanged dating back to an MRI of the brain on 03/16/2015. The remainder of the brain parenchyma is normal in attenuation. The ventricles are normal in size. I see no focal mass effect. There is no midline shift. No extra axial fluid collections are identified. There is no evidence of acute intracranial hemorrhage. No acute skull fracture is identified. The calvarium and skull base are normal in appearance. There is a 3 mm calcific density in the subcutaneous fat of the scalp overlying the anterior inferior left frontal bone just above the left orbit, unchanged. The paranasal sinuses and the mastoid air cells and middle ear cavities are clear.      1. No acute intracranial abnormality is identified. 2. There is mild-to-moderate small vessel disease in the cerebral white  matter. 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 3. The remainder of the head CT is within normal limits. Specifically, no acute skull fracture or intracranial hemorrhage is identified.  Radiation dose reduction techniques were utilized, including automated exposure control and exposure modulation based on body size.   This report was finalized on 10/24/2023 8:07 AM by Dr. Christiano Leon M.D on Workstation: [id number]      XR Chest 1 View    Result Date: 10/23/2023  EXAM: XR CHEST 1 VW-  INDICATION: Weakness  COMPARISON: Radiographs dating back to 3/10/2015       No focal consolidation. No pleural effusion or pneumothorax.  Normal size cardiomediastinal silhouette. Nondisplaced median sternotomy wires.   This report was finalized on 10/23/2023 7:51 AM by Dr. Alexander Causey M.D on Workstation: [id number]      Results for orders placed during the hospital encounter of 04/10/21    Adult Transthoracic Echo Complete w/ Color, Spectral and Contrast if Necessary Per Protocol    Interpretation Summary  · Estimated left ventricular EF = 57% Left ventricular systolic function is normal.  · Left ventricular diastolic function was normal.  · The right ventricular cavity is mildly dilated.  · The right atrial cavity is mildly dilated.  · Mild mitral annular calcification is present. There is mild, bileaflet mitral valve thickening present. Trace mitral valve regurgitation is present. No significant mitral valve stenosis is present.  · No aortic valve regurgitation or stenosis is present. There is mild thickening of the aortic valve. The aortic valve appears trileaflet.      I reviewed his admit chest x-ray which was clear and current film which shows a new left base infiltrate/consolidation    Assessment & Plan      COVID-19 infection is on remdesivir and dexamethasone continue those  Pneumonia his initial x-ray was clear he has a left lower probably lobar infiltrate with elevated procalcitonin I think he has a secondary bacterial pneumonia on his COVID-19 infection.  He is already been started on cefepime and vancomycin we will continue those if MRSA swab is negative we can discontinue the vancomycin.  Acute hypoxic respiratory failure secondary to #2 above is a former smoker had about a 15-pack-year history but quit a number of years ago and no known lung disease.  Supplemental O2 we will try heated high flow O2.  Hyponatremia worsening this may be related to his lung disease he is receiving some saline boluses and IV fluids will need to follow this and see if this corrects.  Sepsis is tachycardic and tachypneic this morning his blood pressure has been good his lactate is up probably related to pneumonia.  He is already been started on antibiotics but with elevated lactate and worsening lactate he needs probably 30 mL/kg fluid bolus he has had 500 at his weight he should have about 2400 I will give him another 2 L.  Lactic acidosis secondary to sepsis with a lactic acid of 4.5 this would be a severe sepsis I probably would go ahead and consider a fluid bolus on him 30 mL/kg.  We will trend response with serial lactates.  Elevated liver function test they have been rising since admission we see this commonly with COVID and COVID-related hepatitis this will need to be monitored.  If it gets higher we will have to address remdesivir and probably would check hepatitis panel if it continues to rise significantly  Hypertension by history blood pressure is adequate I am going to hold his antihypertensives we can resume them if his blood pressure and everything are stable tomorrow, I am afraid that his blood pressure is going to take a turn for the worse with his sepsis.  Polycythemia vera he is on Hydrea with his sepsis we  probably should hold that at least temporarily.  Furthermore he is actually a little anemic  Coronary artery disease with prior coronary bypass grafting  Lethargy very lethargic may be a little encephalopathic probably some metabolic encephalopathy related to his sepsis.      Saman Starr Jr, MD  10/25/23  10:41 EDT    Time: Critical care time 51 minutes

## 2023-10-25 NOTE — PLAN OF CARE
Goal Outcome Evaluation:  Plan of Care Reviewed With: patient        Progress: declining       Fever, increased O2 demand and HR. [facility] notified. Received orders for CXR, BC, ABGs and lactate. Lactate resulted 3.4, fever improved. Orders for 500cc bolus. Currently 3L NC to maintain O2 SAT @ 93% bolus infusing.

## 2023-10-25 NOTE — PROGRESS NOTES
"[facility] Clinical Pharmacy Services: Vancomycin Pharmacokinetic Initial Consult Note    Madhu Santoro is a 77 y.o. male who is on day 1 of pharmacy to dose vancomycin.    Indication: Pneumonia  Consulting Provider: Dr Rios  Planned Duration of Therapy: 7 days  Loading Dose Ordered or Given: n/a  MRSA PCR performed: ordered results pending  Culture/Source:   10/25 BCx x2 pending  10/23 resp panel = C19    Target: -600 mg/L.hr   Pertinent Vanc Dosing History: none  Other Antimicrobials: cefepime    Vitals/Labs  Ht: 172.7 cm (68\"); Wt: 78.8 kg (173 lb 12.8 oz)  Temp Readings from Last 1 Encounters:   10/25/23 99.8 °F (37.7 °C) (Oral)    Estimated Creatinine Clearance: 64.6 mL/min (by C-G formula based on SCr of 1.05 mg/dL).       Results from last 7 days   Lab Units 10/25/23  0446 10/24/23  0443 10/23/23  0548 10/23/23  0507   CREATININE mg/dL 1.05 0.97 1.00  --    WBC 10*3/mm3 3.99 4.18  --  8.40     Assessment/Plan:    Vancomycin Dose:   1500 mg IV every 24  hours  Predictive AUC level for the dose ordered is 503 mg/L.hr, which is within the target of 400-600 mg/L.hr  Vanc Trough has been ordered for 10/27 at 0900     Pharmacy will follow patient's kidney function and will adjust doses and obtain levels as necessary. Thank you for involving pharmacy in this patient's care. Please contact pharmacy with any questions or concerns.                           Tre Iqbal, [facility]  Clinical Pharmacist    "

## 2023-10-26 LAB
ALBUMIN SERPL-MCNC: 3 G/DL (ref 3.5–5.2)
ALBUMIN/GLOB SERPL: 1.1 G/DL
ALP SERPL-CCNC: 217 U/L (ref 39–117)
ALT SERPL W P-5'-P-CCNC: 210 U/L (ref 1–41)
ANION GAP SERPL CALCULATED.3IONS-SCNC: 11.4 MMOL/L (ref 5–15)
ANISOCYTOSIS BLD QL: ABNORMAL
AST SERPL-CCNC: 391 U/L (ref 1–40)
BILIRUB SERPL-MCNC: 0.6 MG/DL (ref 0–1.2)
BUN SERPL-MCNC: 40 MG/DL (ref 8–23)
BUN/CREAT SERPL: 43.5 (ref 7–25)
CALCIUM SPEC-SCNC: 7.9 MG/DL (ref 8.6–10.5)
CHLORIDE SERPL-SCNC: 108 MMOL/L (ref 98–107)
CO2 SERPL-SCNC: 18.6 MMOL/L (ref 22–29)
CREAT SERPL-MCNC: 0.92 MG/DL (ref 0.76–1.27)
D-LACTATE SERPL-SCNC: 2 MMOL/L (ref 0.5–2)
DEPRECATED RDW RBC AUTO: 51.5 FL (ref 37–54)
EGFRCR SERPLBLD CKD-EPI 2021: 85.1 ML/MIN/1.73
ERYTHROCYTE [DISTWIDTH] IN BLOOD BY AUTOMATED COUNT: 12.6 % (ref 12.3–15.4)
GLOBULIN UR ELPH-MCNC: 2.7 GM/DL
GLUCOSE SERPL-MCNC: 136 MG/DL (ref 65–99)
HCT VFR BLD AUTO: 34.1 % (ref 37.5–51)
HGB BLD-MCNC: 12.2 G/DL (ref 13–17.7)
LYMPHOCYTES # BLD MANUAL: 0.32 10*3/MM3 (ref 0.7–3.1)
LYMPHOCYTES NFR BLD MANUAL: 7 % (ref 5–12)
MACROCYTES BLD QL SMEAR: ABNORMAL
MAGNESIUM SERPL-MCNC: 2 MG/DL (ref 1.6–2.4)
MCH RBC QN AUTO: 39.4 PG (ref 26.6–33)
MCHC RBC AUTO-ENTMCNC: 35.8 G/DL (ref 31.5–35.7)
MCV RBC AUTO: 110 FL (ref 79–97)
MONOCYTES # BLD: 1.13 10*3/MM3 (ref 0.1–0.9)
NEUTROPHILS # BLD AUTO: 14.72 10*3/MM3 (ref 1.7–7)
NEUTROPHILS NFR BLD MANUAL: 91 % (ref 42.7–76)
NRBC BLD AUTO-RTO: 0.1 /100 WBC (ref 0–0.2)
PHOSPHATE SERPL-MCNC: 2.6 MG/DL (ref 2.5–4.5)
PLAT MORPH BLD: NORMAL
PLATELET # BLD AUTO: 145 10*3/MM3 (ref 140–450)
PMV BLD AUTO: 10.8 FL (ref 6–12)
POLYCHROMASIA BLD QL SMEAR: ABNORMAL
POTASSIUM SERPL-SCNC: 4.6 MMOL/L (ref 3.5–5.2)
PROT SERPL-MCNC: 5.7 G/DL (ref 6–8.5)
RBC # BLD AUTO: 3.1 10*6/MM3 (ref 4.14–5.8)
SODIUM SERPL-SCNC: 138 MMOL/L (ref 136–145)
VARIANT LYMPHS NFR BLD MANUAL: 2 % (ref 19.6–45.3)
WBC MORPH BLD: NORMAL
WBC NRBC COR # BLD: 16.18 10*3/MM3 (ref 3.4–10.8)

## 2023-10-26 PROCEDURE — 84100 ASSAY OF PHOSPHORUS: CPT | Performed by: STUDENT IN AN ORGANIZED HEALTH CARE EDUCATION/TRAINING PROGRAM

## 2023-10-26 PROCEDURE — 94761 N-INVAS EAR/PLS OXIMETRY MLT: CPT

## 2023-10-26 PROCEDURE — 83605 ASSAY OF LACTIC ACID: CPT | Performed by: INTERNAL MEDICINE

## 2023-10-26 PROCEDURE — 85007 BL SMEAR W/DIFF WBC COUNT: CPT | Performed by: STUDENT IN AN ORGANIZED HEALTH CARE EDUCATION/TRAINING PROGRAM

## 2023-10-26 PROCEDURE — 94799 UNLISTED PULMONARY SVC/PX: CPT

## 2023-10-26 PROCEDURE — 25010000002 DEXAMETHASONE PER 1 MG: Performed by: STUDENT IN AN ORGANIZED HEALTH CARE EDUCATION/TRAINING PROGRAM

## 2023-10-26 PROCEDURE — 85025 COMPLETE CBC W/AUTO DIFF WBC: CPT | Performed by: STUDENT IN AN ORGANIZED HEALTH CARE EDUCATION/TRAINING PROGRAM

## 2023-10-26 PROCEDURE — 94760 N-INVAS EAR/PLS OXIMETRY 1: CPT

## 2023-10-26 PROCEDURE — 97110 THERAPEUTIC EXERCISES: CPT

## 2023-10-26 PROCEDURE — 92610 EVALUATE SWALLOWING FUNCTION: CPT

## 2023-10-26 PROCEDURE — 83735 ASSAY OF MAGNESIUM: CPT | Performed by: STUDENT IN AN ORGANIZED HEALTH CARE EDUCATION/TRAINING PROGRAM

## 2023-10-26 PROCEDURE — 25010000002 CEFEPIME PER 500 MG: Performed by: STUDENT IN AN ORGANIZED HEALTH CARE EDUCATION/TRAINING PROGRAM

## 2023-10-26 PROCEDURE — 25010000002 CEFEPIME PER 500 MG: Performed by: INTERNAL MEDICINE

## 2023-10-26 PROCEDURE — 80053 COMPREHEN METABOLIC PANEL: CPT | Performed by: STUDENT IN AN ORGANIZED HEALTH CARE EDUCATION/TRAINING PROGRAM

## 2023-10-26 RX ORDER — GUAIFENESIN 200 MG/10ML
200 LIQUID ORAL EVERY 4 HOURS PRN
Status: DISCONTINUED | OUTPATIENT
Start: 2023-10-26 | End: 2023-11-09 | Stop reason: HOSPADM

## 2023-10-26 RX ORDER — ACETAMINOPHEN 325 MG/1
650 TABLET ORAL EVERY 4 HOURS PRN
Status: DISCONTINUED | OUTPATIENT
Start: 2023-10-26 | End: 2023-11-03

## 2023-10-26 RX ORDER — PANTOPRAZOLE SODIUM 40 MG/10ML
40 INJECTION, POWDER, LYOPHILIZED, FOR SOLUTION INTRAVENOUS
Status: DISCONTINUED | OUTPATIENT
Start: 2023-10-26 | End: 2023-10-31

## 2023-10-26 RX ORDER — SODIUM CHLORIDE 0.9 % (FLUSH) 0.9 %
10 SYRINGE (ML) INJECTION EVERY 12 HOURS SCHEDULED
Status: DISCONTINUED | OUTPATIENT
Start: 2023-10-26 | End: 2023-10-26

## 2023-10-26 RX ORDER — ACETAMINOPHEN 650 MG/1
650 SUPPOSITORY RECTAL EVERY 4 HOURS PRN
Status: DISCONTINUED | OUTPATIENT
Start: 2023-10-26 | End: 2023-11-03

## 2023-10-26 RX ORDER — SODIUM CHLORIDE 0.9 % (FLUSH) 0.9 %
10 SYRINGE (ML) INJECTION AS NEEDED
Status: DISCONTINUED | OUTPATIENT
Start: 2023-10-26 | End: 2023-10-26

## 2023-10-26 RX ORDER — SODIUM CHLORIDE 9 MG/ML
40 INJECTION, SOLUTION INTRAVENOUS AS NEEDED
Status: DISCONTINUED | OUTPATIENT
Start: 2023-10-26 | End: 2023-11-09 | Stop reason: HOSPADM

## 2023-10-26 RX ORDER — NITROGLYCERIN 0.4 MG/1
0.4 TABLET SUBLINGUAL
Status: DISCONTINUED | OUTPATIENT
Start: 2023-10-26 | End: 2023-11-09 | Stop reason: HOSPADM

## 2023-10-26 RX ORDER — AMLODIPINE BESYLATE 5 MG/1
2.5 TABLET ORAL DAILY
Status: DISCONTINUED | OUTPATIENT
Start: 2023-10-27 | End: 2023-11-02

## 2023-10-26 RX ADMIN — APIXABAN 5 MG: 5 TABLET, FILM COATED ORAL at 20:06

## 2023-10-26 RX ADMIN — CEFEPIME 2000 MG: 2 INJECTION, POWDER, FOR SOLUTION INTRAVENOUS at 17:25

## 2023-10-26 RX ADMIN — Medication 10 ML: at 08:05

## 2023-10-26 RX ADMIN — APIXABAN 5 MG: 5 TABLET, FILM COATED ORAL at 09:02

## 2023-10-26 RX ADMIN — CEFEPIME 2000 MG: 2 INJECTION, POWDER, FOR SOLUTION INTRAVENOUS at 08:03

## 2023-10-26 RX ADMIN — PANTOPRAZOLE SODIUM 40 MG: 40 INJECTION, POWDER, FOR SOLUTION INTRAVENOUS at 09:40

## 2023-10-26 RX ADMIN — DEXAMETHASONE SODIUM PHOSPHATE 6 MG: 10 INJECTION INTRAMUSCULAR; INTRAVENOUS at 08:04

## 2023-10-26 RX ADMIN — CEFEPIME 2000 MG: 2 INJECTION, POWDER, FOR SOLUTION INTRAVENOUS at 00:23

## 2023-10-26 NOTE — PROGRESS NOTES
"    Name: Madhu Santoro ADMIT: 10/23/2023   : 1945  PCP: Damian Sanchez MD    MRN: [id number] LOS: 1 days   AGE/SEX: 77 y.o. male  ROOM: [facility]     Subjective   Subjective   Patient transferred out of the ICU back to telemetry. He says he feels \"not good\" when asked. Denies pain, c/o SOB. Is 100% on 6L, have turned him down to 4L and he is at 98%.        Objective   Objective   Vital Signs  Temp:  [97.3 °F (36.3 °C)-99.1 °F (37.3 °C)] 97.5 °F (36.4 °C)  Heart Rate:  [] 91  Resp:  [16-22] 16  BP: (135-178)/(65-90) 178/85  SpO2:  [89 %-100 %] 100 %  on  Flow (L/min):  [4-60] 4;   Device (Oxygen Therapy): nasal cannula  Body mass index is 26.43 kg/m².  Physical Exam  Constitutional:       General: He is not in acute distress.     Appearance: He is ill-appearing. He is not toxic-appearing.      Comments: Appears weak, lethargic    Cardiovascular:      Rate and Rhythm: Normal rate. Rhythm irregular.   Pulmonary:      Effort: Pulmonary effort is normal. No respiratory distress.      Breath sounds: Rhonchi present. No wheezing.   Abdominal:      General: Bowel sounds are normal.      Palpations: Abdomen is soft.      Tenderness: There is no abdominal tenderness.   Musculoskeletal:         General: No tenderness.      Right lower leg: No edema.      Left lower leg: No edema.   Skin:     General: Skin is warm and dry.   Neurological:      General: No focal deficit present.      Mental Status: He is oriented to person, place, and time. Mental status is at baseline.      Motor: Weakness present.         Results Review     I reviewed the patient's new clinical results.  Results from last 7 days   Lab Units 10/26/23  0229 10/25/23  0446 10/24/23  0443 10/23/23  0507   WBC 10*3/mm3 16.18* 3.99 4.18 8.40   HEMOGLOBIN g/dL 12.2* 13.2 11.9* 13.1   PLATELETS 10*3/mm3 145 133* 159 208     Results from last 7 days   Lab Units 10/26/23  0229 10/25/23  0446 10/24/23  0443 10/23/23  0548   SODIUM mmol/L 138 130* 132* 136 "   POTASSIUM mmol/L 4.6 3.8 4.6 4.2   CHLORIDE mmol/L 108* 97* 99 102   CO2 mmol/L 18.6* 18.3* 21.5* 23.0   BUN mg/dL 40* 21 15 17   CREATININE mg/dL 0.92 1.05 0.97 1.00   GLUCOSE mg/dL 136* 124* 84 138*   Estimated Creatinine Clearance: 73.8 mL/min (by C-G formula based on SCr of 0.92 mg/dL).  Results from last 7 days   Lab Units 10/26/23  0229 10/25/23  0446 10/24/23  0443 10/23/23  0548   ALBUMIN g/dL 3.0* 3.5 3.6 3.9   BILIRUBIN mg/dL 0.6 1.2 0.8 0.7   ALK PHOS U/L 217* 230* 204* 191*   AST (SGOT) U/L 391* 194* 97* 49*   ALT (SGPT) U/L 210* 86* 55* 31     Results from last 7 days   Lab Units 10/26/23  0229 10/25/23  1456 10/25/23  0446 10/24/23  0443 10/23/23  0548   CALCIUM mg/dL 7.9*  --  8.4* 8.3* 9.0   ALBUMIN g/dL 3.0*  --  3.5 3.6 3.9   MAGNESIUM mg/dL 2.0  --  1.7 2.0  --    PHOSPHORUS mg/dL 2.6 1.5* 2.1* 2.1*  --      Results from last 7 days   Lab Units 10/25/23  1456 10/25/23  1041 10/25/23  0732 10/25/23  0446   PROCALCITONIN ng/mL  --   --   --  3.88*   LACTATE mmol/L 1.9 4.4* 4.5* 3.4*     COVID19   Date Value Ref Range Status   10/23/2023 Detected (C) Not Detected - Ref. Range Final   02/21/2023 Not Detected Not Detected - Ref. Range Final     Glucose   Date/Time Value Ref Range Status   10/25/2023 1352 173 (H) 70 - 130 mg/dL Final   10/25/2023 0336 118 70 - 130 mg/dL Final       XR Chest 1 View  Narrative: Portable chest x-ray     HISTORY: Increased oxygen need.     TECHNIQUE: Portable chest x-ray is provided and correlated with x-ray  October 23, 2023. This image was acquired and an exaggerated reverse  lordotic configuration which partly obscures visualization of the lung  bases.     FINDINGS: Sternal wires with new asymmetric density in the left mid and  lower lung zones. The visualized right lung appears clear. Vascular  volume is normal.     Impression: New opacity in the left lung is asymmetric and favored  represent pneumonia.     This report was finalized on 10/25/2023 8:13 AM by Dr. Brady  JORGE ALBERTO Ortega on Workstation: [id number]       Scheduled Medications  [Held by provider] allopurinol, 300 mg, Oral, Daily  amitriptyline, 50 mg, Oral, Nightly  [START ON 10/27/2023] amLODIPine, 2.5 mg, Oral, Daily  apixaban, 5 mg, Oral, BID  cefepime, 2,000 mg, Intravenous, Q8H  dexAMETHasone, 6 mg, Intravenous, Daily  oxybutynin XL, 5 mg, Oral, Daily  pantoprazole, 40 mg, Intravenous, Q AM    Infusions  sodium chloride, 100 mL/hr, Last Rate: 100 mL/hr (10/25/23 1400)    Diet  Diet: Cardiac Diets; Healthy Heart (2-3 Na+); Texture: Regular Texture (IDDSI 7); Fluid Consistency: Thin (IDDSI 0)         I have personally reviewed:  [x]  Laboratory   []  Microbiology   [x]  Radiology   [x]  EKG/Telemetry   []  Cardiology/Vascular   []  Pathology   []  Records    Assessment/Plan     Active Hospital Problems    Diagnosis  POA    **COVID-19 [U07.1]  Yes    Polycythemia [D75.1]  Yes    Chronic anticoagulation [Z79.01]  Not Applicable    PAD (peripheral artery disease) [I73.9]  Yes    Stenosis of carotid artery [I65.29]  Yes    S/P ablation of atrial flutter [Z98.890, Z86.79]  Not Applicable    S/P CABG (coronary artery bypass graft) [Z95.1]  Not Applicable    Coronary artery disease due to lipid rich plaque [I25.10, I25.83]  Yes    HLD (hyperlipidemia) [E78.5]  Yes    Benign essential hypertension [I10]  Yes    Cerebrovascular accident [I63.9]  Yes      Resolved Hospital Problems   No resolved problems to display.     Mr. Santoro is a 77 y.o. former smoker with a history of HTN, a flutter, CVA, HLD, CAD w/ h/o CABG, polycythemia vera, gout, chronic pain/opioid dependence who presents with profound weakness/mechanical fall and was found to have COVID-19 infection.      Symptomatic, hypoxic COVID 19 infection  Superimposed bacterial pneumonia  - symptoms started ~7 days ago, out of window for paxlovid  - started remdesivir, monitor LFTs, will add mucinex and hycodan for symptoms   - pt to ICU on 10/25 for acute deterioration,  requiring Optiflow, he has titrated back to 6L via NC; contiue cefepime, MRSA nares neg, vanco Dcd  - remdesivir held for rising LFTs   - PT/OT for weakness- wife reports he was raking leaves as recently as the weekend     Hypertension  CAD with history of CABG  Atrial flutter with history of ablation  - continue apixaban  - BP labile but mostly normal, hold home anti-hypertensive regimen while he is acutely ill and add back as needed     History of L MCA CVA  Hyperlipidemia  - continue Eliqius     Chronic pain syndrome/neuropathic pain  - continue Norco and Elavil     Gout  - continue allopurinol     Polycythemia vera  - home regimen- hydrea QOD  - most recent CBC note reviewed    Eliquis (home med) for DVT prophylaxis.  Full code.  Discussed with patient and nursing staff.  Anticipate discharge home timing yet to be determined.      Jewell Rios MD  [facility]  10/26/23  15:42 EDT    I wore protective equipment throughout this patient encounter including gloves.  Hand hygiene was performed before donning protective equipment and after removal when leaving the room.    Expected Discharge Date and Time       Expected Discharge Date Expected Discharge Time    Oct 26, 2023              Copied text in this note has been reviewed and is accurate as of 10/26/23.

## 2023-10-26 NOTE — DISCHARGE PLACEMENT REQUEST
"Papito Santoro (77 y.o. Male)       Date of Birth   1945    Social Security Number       Address   [address] [place] [address]    Home Phone   [phone]    MRN   [id number]       [facility]    Marital Status                               Admission Date   10/23/23    Admission Type   Emergency    Admitting Provider   Jewell Rios MD    Attending Provider   Jewell Rios MD    Department, Room/Bed   [facility] CARDIAC INTENSIVE CARE, 3004/1       Discharge Date       Discharge Disposition       Discharge Destination                                 Attending Provider: Jewell Rios MD    Allergies: Atorvastatin, Penicillins    Isolation: Enh Drop/Con   Infection: COVID (confirmed) (10/23/23)   Code Status: CPR    Ht: 172.7 cm (68\")   Wt: 78.8 kg (173 lb 12.8 oz)    Admission Cmt: None   Principal Problem: COVID-19 [U07.1]                   Active Insurance as of 10/23/2023       Primary Coverage       Payor Plan Insurance Group Employer/Plan Group    MEDICARE MEDICARE A & B        Payor Plan Address Payor Plan Phone Number Payor Plan Fax Number Effective Dates    [address] [phone]  12/1/2009 - None Entered    [facility] [id number]         Subscriber Name Subscriber Birth Date Member ID       PAPITO SANTORO 1945 [id number]               Secondary Coverage       Payor Plan Insurance Group Employer/Plan Group    [employer] [id number]       Payor Plan Address Payor Plan Phone Number Payor Plan Fax Number Effective Dates    [address] [phone]  12/1/2009 - None Entered    [place] [address]         Subscriber Name Subscriber Birth Date Member ID       PAPITO SANTORO 1945 [id number]                     Emergency Contacts        (Rel.) Home Phone Work Phone Mobile Phone    Brooke Santoro (Spouse) [phone] -- [phone]                "

## 2023-10-26 NOTE — THERAPY TREATMENT NOTE
Patient Name: Madhu Santoro  : 1945    MRN: [id number]                              Today's Date: 10/26/2023       Admit Date: 10/23/2023    Visit Dx:     ICD-10-CM ICD-9-CM   1. COVID-19  U07.1 079.89   2. Generalized weakness  R53.1 780.79   3. Fall at home, initial encounter  W19.XXXA E888.9    Y92.009 E849.0   4. Abrasion of left ear, initial encounter  S00.412A 910.0   5. Elevated troponin  R79.89 790.6     Patient Active Problem List   Diagnosis    Anxiety    Arthritis    Coronary artery disease due to lipid rich plaque    HLD (hyperlipidemia)    Benign essential hypertension    DDD (degenerative disc disease), lumbosacral    Cerebrovascular accident    Encounter for screening for cardiovascular disorders    Gastroesophageal reflux disease    Hyperlipidemia    Stenosis of carotid artery    Former smoker    History of cardiac catheterization    S/P ablation of atrial flutter    Typical atrial flutter    S/P CABG (coronary artery bypass graft)    Adenomatous polyp of ascending colon    Abdominal bloating    Stroke    PAD (peripheral artery disease)    Acute cholecystitis    Right upper quadrant abdominal pain    Chronic pain of both hips    Chronic bilateral low back pain without sciatica    Sacroiliac joint dysfunction of both sides    Encounter for long-term (current) use of high-risk medication    Lumbar facet arthropathy    Stroke-like symptoms    CVA (cerebral vascular accident)    Personal history of colonic polyps    Arthralgia of multiple joints    Iron deficiency    Thrombocytosis    Left ureteral stone    Obstructive uropathy    Chronic anticoagulation    Facial skin lesion    Iron deficiency anemia    Polycythemia    Neuropathy    Weakness    Pneumonia    Close exposure to COVID-19 virus    Polycythemia vera    Chronic gout without tophus    COVID-19     Past Medical History:   Diagnosis Date    Anxiety     Arthritis     Atrial flutter     Status post cavotricuspid isthmus ablation by   "[facility] on 4/18/17    Mandel esophagus     Benign essential hypertension     CAD (coronary artery disease)     3 vessel CABG 4/11/17 by Dr. Cortez: ROSARIO-prox LAD, SVG-OM1, SVG-OM3    Carotid artery disease     Status post carotid endarterectomy - USG 4/10/17: 50-59% NICHELLE, 1-15% LICA.     Colonic polyp     Cyst of pancreas     DDD (degenerative disc disease), lumbosacral     GERD (gastroesophageal reflux disease)     H/O bone density study 2013    H/O complete eye exam 2014    History of kidney stone     HLD (hyperlipidemia)     Hypertension     Kidney stone     8/22/22    Lipid screening 05/31/2013    Low back pain     physical therapy [facility] 5-12-10    Screening for prostate cancer 07/07/2015    Skin cancer     nose    Stroke     RESIDUAL--\"BALANCE ISSUES\"     Past Surgical History:   Procedure Laterality Date    CARDIAC CATHETERIZATION N/A 04/10/2017    Procedure: Left Heart Cath;  Surgeon: Marjorie Healy MD;  Location:  [facility];  Service:     CARDIAC CATHETERIZATION N/A 04/10/2017    Procedure: Coronary angiography;  Surgeon: Marjorie Healy MD;  Location:  [facility];  Service:     CARDIAC CATHETERIZATION N/A 04/10/2017    Procedure: Left ventriculography;  Surgeon: Marjorie Healy MD;  Location: [facility];  Service:     CARDIAC CATHETERIZATION  2011    CARDIAC ELECTROPHYSIOLOGY PROCEDURE N/A 04/18/2017    Procedure: Ablation atrial flutter;  Surgeon: Jose Antonio Gustafson MD;  Location:  [facility];  Service:     CAROTID ENDARTERECTOMY      CHOLECYSTECTOMY      CHOLECYSTECTOMY WITH INTRAOPERATIVE CHOLANGIOGRAM N/A 09/07/2019    Procedure: Laparoscopic cholecystectomy with intraoperative cholangiogram;  Surgeon: Gauri Travis MD;  Location: [facility] MAIN OR;  Service: General    COLONOSCOPY  01/06/2015    Diverticulosis, one TA    COLONOSCOPY N/A 02/14/2019    tics, NBIH, adenomatous polyp x 2    COLONOSCOPY N/A 11/05/2021    " Procedure: COLONOSCOPY TO CECUM AND TERM. ILEUM WITH COLD POLYPECTOMIES;  Surgeon: Everton Abel MD;  Location: [facility] ENDOSCOPY;  Service: Gastroenterology;  Laterality: N/A;  PRE OP - PERS H/O POLYPS  POST OP - COLON POLYPS,, DIVERTICULOSIS, HEMORRHOIDS    CORONARY ARTERY BYPASS GRAFT N/A 04/11/2017    Procedure: AR STERNOTOMY CORONARY ARTERY BYPASS GRAFT TIMES 3 USING LEFT INTERNAL MAMMARY ARTERY AND LEFT GREATER SAPHENOUS VEIN GRAFT PER ENDOSCOPIC VEIN HARVESTING AND PRP ;  Surgeon: Temo Cortez MD;  Location: [facility] MAIN OR;  Service:     ENDOSCOPY  01/06/2015    HH, Ervin's esophagus    ENDOSCOPY N/A 02/14/2019    Z line irregular, HH, Ervin's esophagus    ENDOSCOPY N/A 11/05/2021    Procedure: ESOPHAGOGASTRODUODENOSCOPY WITH BIOPSIES;  Surgeon: Everton Abel MD;  Location: [facility] ENDOSCOPY;  Service: Gastroenterology;  Laterality: N/A;  PRE OP - PERS H/O ERVIN'S  POST OP - IRREG Z LINE    KNEE SURGERY Left     URETEROSCOPY LASER LITHOTRIPSY WITH STENT INSERTION Left 8/23/2022    Procedure: Cysto retrograde with left uretro stent placement;  Surgeon: Damien Oliveira MD;  Location: [facility] MAIN OR;  Service: Urology;  Laterality: Left;    VASECTOMY        General Information       Row Name 10/26/23 1638          Physical Therapy Time and Intention    Document Type therapy note (daily note)  -PC     Mode of Treatment physical therapy  -PC       Row Name 10/26/23 1638          General Information    Existing Precautions/Restrictions fall  -PC       Row Name 10/26/23 1638          Cognition    Orientation Status (Cognition) person;oriented x 3  -PC               User Key  (r) = Recorded By, (t) = Taken By, (c) = Cosigned By      Initials Name Provider Type    PC Naomi Tucker PT Physical Therapist                   Mobility       Row Name 10/26/23 1632          Bed Mobility    Supine-Sit [place] (Bed Mobility) moderate assist (50% patient effort)  -PC     Sit-Supine [place] (Bed  Mobility) moderate assist (50% patient effort)  -PC     Comment, (Bed Mobility) aamir worked on scooting along bed in sitting  -PC       Row Name 10/26/23 1638          Sit-Stand Transfer    Sit-Stand [place] (Transfers) moderate assist (50% patient effort)  -PC     Comment, (Sit-Stand Transfer) sit to stand x 2, HHA on one side, pt held to bedrail with the other  -PC       Row Name 10/26/23 1638          Gait/Stairs (Locomotion)    Distance in Feet (Gait) pt took 2 sidesteps each stand, difficulty shifting weight in order to take a step  -PC     Deviations/Abnormal Patterns (Gait) base of support, wide;gait speed decreased  -PC               User Key  (r) = Recorded By, (t) = Taken By, (c) = Cosigned By      Initials Name Provider Type    PC Naomi Tucker, PT Physical Therapist                   Obj/Interventions       Row Name 10/26/23 1641          Motor Skills    Therapeutic Exercise --  perf general strengthening ex in supine and sitting, 10 reps, B UEs and B LEs  -PC               User Key  (r) = Recorded By, (t) = Taken By, (c) = Cosigned By      Initials Name Provider Type    PC Naomi Tucker, PT Physical Therapist                   Goals/Plan    No documentation.                  Clinical Impression       Row Name 10/26/23 1641          Pain    Pre/Posttreatment Pain Comment pt c/o throat being sore, and did have general soreness, appeared uncomfortable, did not rate  -PC     Pain Intervention(s) Repositioned  -PC       Row Name 10/26/23 1641          Plan of Care Review    Plan of Care Reviewed With patient  -PC     Progress improving  -PC     Outcome Evaluation Pt transferred to CICU for inc o2 needs, now back on 6S, on 4L o2, o2 sats >90% throughout. Pt is very weak, he needed moderate assist to sit up , stand, and take a few sidesteps along bed. He will cont to benefit from PT and I encouraged him to move around in bed as much as possible, get to a chair with nursing, and showed him some  exercises he could do in bed, PT will cont to progress as tolerated  -PC       Row Name 10/26/23 1641          Positioning and Restraints    Pre-Treatment Position in bed  -PC     Post Treatment Position bed  -PC     In Bed call light within reach;encouraged to call for assist;exit alarm on  -PC               User Key  (r) = Recorded By, (t) = Taken By, (c) = Cosigned By      Initials Name Provider Type    PC Naomi Tucker PT Physical Therapist                   Outcome Measures       Row Name 10/26/23 1648          How much help from another person do you currently need...    Turning from your back to your side while in flat bed without using bedrails? 2  -PC     Moving from lying on back to sitting on the side of a flat bed without bedrails? 2  -PC     Moving to and from a bed to a chair (including a wheelchair)? 2  -PC     Standing up from a chair using your arms (e.g., wheelchair, bedside chair)? 2  -PC     Climbing 3-5 steps with a railing? 1  -PC     To walk in hospital room? 1  -PC     AM-PAC 6 Clicks Score (PT) 10  -PC     Highest level of mobility 4 --> Transferred to chair/commode  -PC               User Key  (r) = Recorded By, (t) = Taken By, (c) = Cosigned By      Initials Name Provider Type    PC Naomi Tucker PT Physical Therapist                                 Physical Therapy Education       Title: PT OT SLP Therapies (Done)       Topic: Physical Therapy (Done)       Point: Mobility training (Done)       Learning Progress Summary             Patient Acceptance, E,D, DU by PC at 10/26/2023 1649    Acceptance, E,TB,D, VU,NR by  at 10/24/2023 1535                         Point: Home exercise program (Done)       Learning Progress Summary             Patient Acceptance, E,D, DU by PC at 10/26/2023 1649    Acceptance, E,TB,D, VU,NR by  at 10/24/2023 1535                         Point: Body mechanics (Done)       Learning Progress Summary             Patient Acceptance, E,D, DU by PC at  10/26/2023 1649    Acceptance, E,TB,D, VU,NR by  at 10/24/2023 1535                         Point: Precautions (Done)       Learning Progress Summary             Patient Acceptance, E,D, DU by  at 10/26/2023 1649    Acceptance, E,TB,D, VU,NR by  at 10/24/2023 1535                                         User Key       Initials Effective Dates Name Provider Type Discipline     06/16/21 -  Blanca Elkins, PT Physical Therapist PT     06/16/21 -  Naomi Tucker PT Physical Therapist PT                  PT Recommendation and Plan     Plan of Care Reviewed With: patient  Progress: improving  Outcome Evaluation: Pt transferred to CICU for inc o2 needs, now back on 6S, on 4L o2, o2 sats >90% throughout. Pt is very weak, he needed moderate assist to sit up , stand, and take a few sidesteps along bed. He will cont to benefit from PT and I encouraged him to move around in bed as much as possible, get to a chair with nursing, and showed him some exercises he could do in bed, PT will cont to progress as tolerated     Time Calculation:         PT Charges       Row Name 10/26/23 1649             Time Calculation    Start Time 1602  -PC      Stop Time 1632  -PC      Time Calculation (min) 30 min  -PC      PT Received On 10/26/23  -PC      PT - Next Appointment 10/27/23  -PC                User Key  (r) = Recorded By, (t) = Taken By, (c) = Cosigned By      Initials Name Provider Type     Naomi Tucker, PT Physical Therapist                  Therapy Charges for Today       Code Description Service Date Service Provider Modifiers Qty    21757804879 HC PT THER PROC EA 15 MIN 10/26/2023 Naomi Tucker PT GP 2            PT G-Codes  Outcome Measure Options: AM-PAC 6 Clicks Daily Activity (OT)  AM-PAC 6 Clicks Score (PT): 10  AM-PAC 6 Clicks Score (OT): 9       Naomi Tucker PT  10/26/2023

## 2023-10-26 NOTE — THERAPY EVALUATION
Acute Care - Speech Language Pathology   Swallow Initial Evaluation [facility]     Patient Name: Madhu Santoro  : 1945  MRN: [id number]  Today's Date: 10/26/2023               Admit Date: 10/23/2023    Visit Dx:     ICD-10-CM ICD-9-CM   1. COVID-19  U07.1 079.89   2. Generalized weakness  R53.1 780.79   3. Fall at home, initial encounter  W19.XXXA E888.9    Y92.009 E849.0   4. Abrasion of left ear, initial encounter  S00.412A 910.0   5. Elevated troponin  R79.89 790.6     Patient Active Problem List   Diagnosis    Anxiety    Arthritis    Coronary artery disease due to lipid rich plaque    HLD (hyperlipidemia)    Benign essential hypertension    DDD (degenerative disc disease), lumbosacral    Cerebrovascular accident    Encounter for screening for cardiovascular disorders    Gastroesophageal reflux disease    Hyperlipidemia    Stenosis of carotid artery    Former smoker    History of cardiac catheterization    S/P ablation of atrial flutter    Typical atrial flutter    S/P CABG (coronary artery bypass graft)    Adenomatous polyp of ascending colon    Abdominal bloating    Stroke    PAD (peripheral artery disease)    Acute cholecystitis    Right upper quadrant abdominal pain    Chronic pain of both hips    Chronic bilateral low back pain without sciatica    Sacroiliac joint dysfunction of both sides    Encounter for long-term (current) use of high-risk medication    Lumbar facet arthropathy    Stroke-like symptoms    CVA (cerebral vascular accident)    Personal history of colonic polyps    Arthralgia of multiple joints    Iron deficiency    Thrombocytosis    Left ureteral stone    Obstructive uropathy    Chronic anticoagulation    Facial skin lesion    Iron deficiency anemia    Polycythemia    Neuropathy    Weakness    Pneumonia    Close exposure to COVID-19 virus    Polycythemia vera    Chronic gout without tophus    COVID-19     Past Medical History:   Diagnosis Date    Anxiety     Arthritis     Atrial  "flutter     Status post cavotricuspid isthmus ablation by Dr. Gustafson on 4/18/17    Mandel esophagus     Benign essential hypertension     CAD (coronary artery disease)     3 vessel CABG 4/11/17 by Dr. Cortez: ROSARIO-prox LAD, SVG-OM1, SVG-OM3    Carotid artery disease     Status post carotid endarterectomy - USG 4/10/17: 50-59% NICHELLE, 1-15% LICA.     Colonic polyp     Cyst of pancreas     DDD (degenerative disc disease), lumbosacral     GERD (gastroesophageal reflux disease)     H/O bone density study 2013    H/O complete eye exam 2014    History of kidney stone     HLD (hyperlipidemia)     Hypertension     Kidney stone     8/22/22    Lipid screening 05/31/2013    Low back pain     physical therapy [facility] 5-12-10    Screening for prostate cancer 07/07/2015    Skin cancer     nose    Stroke     RESIDUAL--\"BALANCE ISSUES\"     Past Surgical History:   Procedure Laterality Date    CARDIAC CATHETERIZATION N/A 04/10/2017    Procedure: Left Heart Cath;  Surgeon: Marjorie Healy MD;  Location[facility] C[facility]AS[facility] LOC[facility];  Service:     CARDIAC CATHETERIZATION N/A 04/10/2017    Procedure: Coronary angiography;  Surgeon: Marjorie Healy MD;  [facility];  Service:     CARDIAC CATHETERIZATION N/A 04/10/2017    Procedure: Left ventriculography;  Surgeon: Marjorie Healy MD;  [facility];  Service:     CARDIAC CATHETERIZATION  2011    CARDIAC ELECTROPHYSIOLOGY PROCEDURE N/A 04/18/2017    Procedure: Ablation atrial flutter;  Surgeon: Jose Antonio Gustafson MD;  Location: [facility] C[facility]AS[facility] LOC[facility];  Service:     CAROTID ENDARTERECTOMY      CHOLECYSTECTOMY      CHOLECYSTECTOMY WITH INTRAOPERATIVE CHOLANGIOGRAM N/A 09/07/2019    Procedure: Laparoscopic cholecystectomy with intraoperative cholangiogram;  Surgeon: Gauri Travis MD;  Location: [facility] MAIN OR;  Service: General    COLONOSCOPY  01/06/2015    Diverticulosis, one TA    COLONOSCOPY N/A 02/14/2019    tics, " NBIH, adenomatous polyp x 2    COLONOSCOPY N/A 11/05/2021    Procedure: COLONOSCOPY TO CECUM AND TERM. ILEUM WITH COLD POLYPECTOMIES;  Surgeon: Everton Abel MD;  Location:  [facility];  Service: Gastroenterology;  Laterality: N/A;  PRE OP - PERS H/O POLYPS  POST OP - COLON POLYPS,, DIVERTICULOSIS, HEMORRHOIDS    CORONARY ARTERY BYPASS GRAFT N/A 04/11/2017    Procedure: AR STERNOTOMY CORONARY ARTERY BYPASS GRAFT TIMES 3 USING LEFT INTERNAL MAMMARY ARTERY AND LEFT GREATER SAPHENOUS VEIN GRAFT PER ENDOSCOPIC VEIN HARVESTING AND PRP ;  Surgeon: Temo Cortez MD;  Location: [facility] MAIN OR;  Service:     ENDOSCOPY  01/06/2015    HH, Ervin's esophagus    ENDOSCOPY N/A 02/14/2019    Z line irregular, HH, Ervin's esophagus    ENDOSCOPY N/A 11/05/2021    Procedure: ESOPHAGOGASTRODUODENOSCOPY WITH BIOPSIES;  Surgeon: Everton Abel MD;  Location:  [facility];  Service: Gastroenterology;  Laterality: N/A;  PRE OP - PERS H/O ERVIN'S  POST OP - IRREG Z LINE    KNEE SURGERY Left     URETEROSCOPY LASER LITHOTRIPSY WITH STENT INSERTION Left 8/23/2022    Procedure: Cysto retrograde with left uretro stent placement;  Surgeon: Damien Oliveira MD;  Location: [facility] MAIN OR;  Service: Urology;  Laterality: Left;    VASECTOMY         SLP Recommendation and Plan  SLP Swallowing Diagnosis: suspected pharyngeal dysphagia (10/26/23 0900)  SLP Diet Recommendation: NPO (10/26/23 0900)     SLP Rec. for Method of Medication Administration: meds via alternate route (10/26/23 0900)     Monitor for Signs of Aspiration: yes, notify SLP if any concerns (10/26/23 0900)  Recommended Diagnostics: reassess via clinical swallow evaluation (10/26/23 0900)  Swallow Criteria for Skilled Therapeutic Interventions Met: demonstrates skilled criteria (10/26/23 0900)     Rehab Potential/Prognosis, Swallowing: re-evaluate goals as necessary (10/26/23 0900)  Therapy Frequency (Swallow): PRN (10/26/23 0900)  Predicted Duration Therapy  Intervention (Days): until discharge (10/26/23 0900)  Oral Care Recommendations: Oral Care BID/PRN (10/26/23 0900)                                      Oral Care Recommendations: Oral Care BID/PRN (10/26/23 0900)    Outcome Evaluation: Clinical swallow evaluation completed. RN at bedside throughout evaluation. Patient with wet vocal quality and breathing with difficulties clearing despite cued coughs/throat clears and swallowing. Decreased vocal intensity also observed. Minimal trials completed. Consistent wet voice with x1 ice chip and x1 trial of puree. RN administered small med in trial of puree during evaluation. Decreased wet voice, however, subtle wetness persisted. Suspect reduced laryngeal elevation upon neck palpation. Recommend NPO. Meds via alternate route. Speech to follow for re-eval.      SWALLOW EVALUATION (last 72 hours)       SLP Adult Swallow Evaluation       Row Name 10/26/23 0900                   Rehab Evaluation    Document Type evaluation  -CR        Subjective Information no complaints  -CR        Patient Observations alert;cooperative  -CR        Patient Effort good  -CR        Symptoms Noted During/After Treatment none  -CR           General Information    Patient Profile Reviewed yes  -CR        Pertinent History Of Current Problem 78 y/o male admitted with generalized weakness and fall found to have COVID-19 and left mid-lower lobe PNA. Hx includes CVA.  -CR        Current Method of Nutrition other (see comments);regular textures;thin liquids  trays being held  -CR        Precautions/Limitations, Vision WFL;for purposes of eval  -CR        Precautions/Limitations, Hearing WFL;for purposes of eval  -CR        Prior Level of Function-Communication WFL  -CR        Prior Level of Function-Swallowing no diet consistency restrictions  -CR        Plans/Goals Discussed with patient;agreed upon  -CR        Barriers to Rehab medically complex  -CR           Pain    Additional Documentation Pain  "Scale: Numbers Pre/Post-Treatment (Group)  -CR           Pain Scale: Numbers Pre/Post-Treatment    Pre/Posttreatment Pain Comment \"Sore throat\"; pt did not rate pain  -CR           Oral Motor Structure and Function    Dentition Assessment natural, present and adequate  -CR        Secretion Management problems swallowing secretions;wet vocal quality;requires suctioning to control secretions  -CR        Mucosal Quality moist, healthy  -CR           General Eating/Swallowing Observations    Respiratory Support Currently in Use high-flow nasal cannula  -CR        O2 Liters 6L  -CR           Clinical Swallow Eval    Clinical Swallow Evaluation Summary Clinical swallow evaluation completed. RN at bedside throughout evaluation. Patient with wet vocal quality and breathing with difficulties clearing despite cued coughs/throat clears and swallowing. Decreased vocal intensity also observed. Patient using suction throughout evaluation, however, little to no output. Minimal trials completed. Consistent wet voice with x1 ice chip and x1 trial of puree.  RN administered small med in trial of puree during evaluation. Decreased wet voice, however, subtle wetness persisted. Suspect reduced laryngeal elevation upon  neck palpation. Recommend NPO. Meds via alternate route. Speech to follow for re-eval.  -CR           SLP Evaluation Clinical Impression    SLP Swallowing Diagnosis suspected pharyngeal dysphagia  -CR        Functional Impact risk of aspiration/pneumonia  -CR        Rehab Potential/Prognosis, Swallowing re-evaluate goals as necessary  -CR        Swallow Criteria for Skilled Therapeutic Interventions Met demonstrates skilled criteria  -CR           Recommendations    Therapy Frequency (Swallow) PRN  -CR        Predicted Duration Therapy Intervention (Days) until discharge  -CR        SLP Diet Recommendation NPO  -CR        Recommended Diagnostics reassess via clinical swallow evaluation  -CR        Oral Care " Recommendations Oral Care BID/PRN  -CR        SLP Rec. for Method of Medication Administration meds via alternate route  -CR        Monitor for Signs of Aspiration yes;notify SLP if any concerns  -CR           Swallow Goals (SLP)    Swallow LTGs Patient will demonstrate functional swallow for  -CR           (LTG) Patient will demonstrate functional swallow for    Diet Texture (Demonstrate functional swallow) soft to chew (chopped) textures  -CR        Liquid viscosity (Demonstrate functional swallow) thin liquids  -CR        [place] (Demonstrate functional swallow) independently (over 90% accuracy)  -CR        Time Frame (Demonstrate functional swallow) by discharge  -CR        Progress/Outcomes (Demonstrate functional swallow) new goal  -CR                  User Key  (r) = Recorded By, (t) = Taken By, (c) = Cosigned By      Initials Name Effective Dates    Adry Lentz SLP 08/28/23 -                     EDUCATION  The patient has been educated in the following areas:   Dysphagia (Swallowing Impairment).        SLP GOALS       Row Name 10/26/23 0900             (LTG) Patient will demonstrate functional swallow for    Diet Texture (Demonstrate functional swallow) soft to chew (chopped) textures  -CR      Liquid viscosity (Demonstrate functional swallow) thin liquids  -CR      [place] (Demonstrate functional swallow) independently (over 90% accuracy)  -CR      Time Frame (Demonstrate functional swallow) by discharge  -CR      Progress/Outcomes (Demonstrate functional swallow) new goal  -CR                User Key  (r) = Recorded By, (t) = Taken By, (c) = Cosigned By      Initials Name Provider Type    Adry Lentz SLP Speech and Language Pathologist                       Time Calculation:    Time Calculation- SLP       Row Name 10/26/23 0918             Time Calculation- SLP    SLP Start Time 0755  -CR      SLP Received On 10/26/23  -CR         Untimed Charges    [id number] Eval Oral  Pharyng Swallow Minutes 60  -CR         Total Minutes    Untimed Charges Total Minutes 60  -CR       Total Minutes 60  -CR                User Key  (r) = Recorded By, (t) = Taken By, (c) = Cosigned By      Initials Name Provider Type    Adry Lentz SLP Speech and Language Pathologist                    Therapy Charges for Today       Code Description Service Date Service Provider Modifiers Qty    [id number] HC ST EVAL ORAL PHARYNG SWALLOW 4 10/26/2023 Adry Moise SLP GN 1                 GONZÁLEZ Camilo  10/26/2023

## 2023-10-26 NOTE — PROGRESS NOTES
"              Patient Care Team:  Damian Sanchez MD as PCP - General (Family Medicine)  Jr Temo Cortez MD as Surgeon (Cardiothoracic Surgery)  Netta Mayorga Jr., MD as Consulting Physician (Vascular Surgery)  Damian Sanchez MD as Referring Physician (Family Medicine)  Aquiles Lopez MD as Consulting Physician (Hematology and Oncology)  Christy Luther APRN as Nurse Practitioner (Nurse Practitioner)  Damian Sanchez MD as Referring Physician (Family Medicine)      Subjective     Patient is a 77 y.o. male.  Following for respiratory failure severe sepsis secondary to COVID-19 infection and superimposed bacterial pneumonia left lung base.  Patient's main complaint today is sore throat he also feels somewhat short of breath although they have been able to wean him down to 6 L nasal cannula O2 and his oxygen saturations are in the upper 90s.  He is coughing up a lot of purulent thick brown mucus no chest pain no nausea or vomiting.      Review of Systems:        History  Past Medical History:   Diagnosis Date    Anxiety     Arthritis     Atrial flutter     Status post cavotricuspid isthmus ablation by Dr. Gustafson on 4/18/17    Mandel esophagus     Benign essential hypertension     CAD (coronary artery disease)     3 vessel CABG 4/11/17 by Dr. Cortez: ROSARIO-prox LAD, SVG-OM1, SVG-OM3    Carotid artery disease     Status post carotid endarterectomy - [facility] 4/10/17: 50-59% NICHELLE, 1-15% LICA.     Colonic polyp     Cyst of pancreas     DDD (degenerative disc disease), lumbosacral     GERD (gastroesophageal reflux disease)     H/O bone density study 2013    H/O complete eye exam 2014    History of kidney stone     HLD (hyperlipidemia)     Hypertension     Kidney stone     8/22/22    Lipid screening 05/31/2013    Low back pain     physical therapy [facility] 5-12-10    Screening for prostate cancer 07/07/2015    Skin cancer     nose    Stroke     RESIDUAL--\"BALANCE ISSUES\"     Past Surgical History: "   Procedure Laterality Date    CARDIAC CATHETERIZATION N/A 04/10/2017    Procedure: Left Heart Cath;  Surgeon: Marjorie Healy MD;  Location: [facility] CATH INVASIVE LOCATION;  Service:     CARDIAC CATHETERIZATION N/A 04/10/2017    Procedure: Coronary angiography;  Surgeon: Marjorie Healy MD;  [facility]ATH [facility] LOC[facility];  Service:     CARDIAC CATHETERIZATION N/A 04/10/2017    Procedure: Left ventriculography;  Surgeon: Marjorie Healy MD;  [facility] CATH [facility]ASIVE LOC[facility];  Service:     CARDIAC CATHETERIZATION  2011    CARDIAC ELECTROPHYSIOLOGY PROCEDURE N/A 04/18/2017    Procedure: Ablation atrial flutter;  Surgeon: Joes Antonio Gustafson MD;  Location: [facility] CATH INVASIVE LOCATION;  Service:     CAROTID ENDARTERECTOMY      CHOLECYSTECTOMY      CHOLECYSTECTOMY WITH INTRAOPERATIVE CHOLANGIOGRAM N/A 09/07/2019    Procedure: Laparoscopic cholecystectomy with intraoperative cholangiogram;  Surgeon: Gauri Travis MD;  Location: [facility] MAIN OR;  Service: General    COLONOSCOPY  01/06/2015    Diverticulosis, one TA    COLONOSCOPY N/A 02/14/2019    tics, NBIH, adenomatous polyp x 2    COLONOSCOPY N/A 11/05/2021    Procedure: COLONOSCOPY TO CECUM AND TERM. ILEUM WITH COLD POLYPECTOMIES;  Surgeon: Everton Abel MD;  Location: [facility] ENDOSCOPY;  Service: Gastroenterology;  Laterality: N/A;  PRE OP - PERS H/O POLYPS  POST OP - COLON POLYPS,, DIVERTICULOSIS, HEMORRHOIDS    CORONARY ARTERY BYPASS GRAFT N/A 04/11/2017    Procedure: AR STERNOTOMY CORONARY ARTERY BYPASS GRAFT TIMES 3 USING LEFT INTERNAL MAMMARY ARTERY AND LEFT GREATER SAPHENOUS VEIN GRAFT PER ENDOSCOPIC VEIN HARVESTING AND PRP ;  Surgeon: Temo Cortez MD;  Location: [facility] OR;  Service:     ENDOSCOPY  01/06/2015    HH, Mandel's esophagus    ENDOSCOPY N/A 02/14/2019    Z line irregular, HH, Mandel's esophagus    ENDOSCOPY N/A 11/05/2021    Procedure: ESOPHAGOGASTRODUODENOSCOPY WITH BIOPSIES;  Surgeon: Everton Abel MD;  Location:   [facility];  Service: Gastroenterology;  Laterality: N/A;  PRE OP - PERS H/O ERVIN'S  POST OP - IRREG Z LINE    KNEE SURGERY Left     URETEROSCOPY LASER LITHOTRIPSY WITH STENT INSERTION Left 2022    Procedure: Cysto retrograde with left uretro stent placement;  Surgeon: Damien Oliveira MD;  Location: [facility] MAIN OR;  Service: Urology;  Laterality: Left;    VASECTOMY       Social History     Socioeconomic History    Marital status:      Spouse name: Brooke    Years of education: 9th grade   Tobacco Use    Smoking status: Former     Packs/day: 1     Types: Cigarettes     Start date: 1959     Quit date: 2009     Years since quittin.8    Smokeless tobacco: Never    Tobacco comments:     CAFFEINE USE   Vaping Use    Vaping Use: Never used   Substance and Sexual Activity    Alcohol use: Not Currently     Comment: rarely    Drug use: No    Sexual activity: Defer     Partners: Female     Family History   Problem Relation Age of Onset    Hypertension Mother     Heart disease Mother     Heart attack Mother     Stroke Mother     Heart disease Father     Heart attack Father     Stroke Father     Hypertension Sister     Heart attack Brother     Heart disease Brother     No Known Problems Brother     Heart disease Brother     Heart attack Brother     Diabetes Brother     No Known Problems Maternal Grandmother     No Known Problems Maternal Grandfather     No Known Problems Paternal Grandmother     No Known Problems Paternal Grandfather     Pancreatic cancer Paternal Cousin     Arthritis Other     Diabetes Other     Hypertension Other     Kidney disease Other         stones    Malig Hyperthermia Neg Hx          Allergies:  Atorvastatin and Penicillins    Medications:  Prior to Admission medications    Medication Sig Start Date End Date Taking? Authorizing Provider   allopurinol (Zyloprim) 300 MG tablet Take 1 tablet by mouth Daily. 23  Yes Damian Sanchez MD   amitriptyline (ELAVIL) 50 MG  tablet TAKE ONE TABLET BY MOUTH ONCE NIGHTLY 9/28/23  Yes Mukesh Russell II, MD   amLODIPine (NORVASC) 2.5 MG tablet Take 1 tablet by mouth Daily. 1/12/23  Yes Kate Ayala APRN   apixaban (ELIQUIS) 5 MG tablet tablet Take 1 tablet by mouth 2 (Two) Times a Day. 1/12/23  Yes Kate Ayala APRN   HYDROcodone-acetaminophen (NORCO)  MG per tablet Take 1 tablet by mouth Every 8 (Eight) Hours As Needed for Severe Pain. 30 day supply 10/12/23  Yes Marisol ePrales APRN   hydroxyurea (HYDREA) 500 MG capsule Take 1 capsule by mouth Every Other Day. 9/22/23  Yes Aquiles Lopez MD   losartan (Cozaar) 50 MG tablet Take 1 tablet by mouth Daily. 10/4/23  Yes Damian Sanchez MD   Myrbetriq 25 MG tablet sustained-release 24 hour 24 hr tablet Take 1 tablet by mouth Daily. 10/5/20  Yes Gary Garcia MD   potassium chloride 10 MEQ CR tablet TAKE ONE TABLET BY MOUTH DAILY 4/4/23  Yes Damian Sanchez MD   RABEprazole (Aciphex) 20 MG EC tablet Take 1 tablet by mouth Daily. 10/4/23  Yes Damian Sanchez MD   rosuvastatin (Crestor) 20 MG tablet Take 1 tablet by mouth Daily. 7/11/23  Yes Damian Sanchez MD   furosemide (LASIX) 20 MG tablet TAKE ONE TABLET BY MOUTH DAILY 4/4/23   Damian Sanchez MD   HYDROcodone-acetaminophen (NORCO) 5-325 MG per tablet Take 2 tablets by mouth Every 8 (Eight) Hours As Needed for Severe Pain. 30 day supply. DNF 10/19/23 10/17/23   Marisol Perales APRN   Ibuprofen 3 %, Gabapentin 10 %, Baclofen 2 %, lidocaine 4 %, Ketamine HCl 4 % Apply 1-2 g topically to the appropriate area as directed 3 (Three) to 4 (Four) times daily. 10/12/23 10/11/24  Marisol Perales APRN   mupirocin (BACTROBAN) 2 % cream Apply 1 application topically to the appropriate area as directed 2 (Two) Times a Day. 7/18/22   Elmo Owens APRN     allopurinol, 300 mg, Oral, Daily  amitriptyline, 50 mg, Oral, Nightly  amLODIPine, 2.5 mg, Oral, Daily  apixaban, 5 mg, Oral, BID  cefepime, 2,000 mg,  "Intravenous, Q8H  dexAMETHasone, 6 mg, Intravenous, Daily  guaiFENesin, 600 mg, Oral, Q12H  hydroxyurea, 500 mg, Oral, Every Other Day  losartan, 50 mg, Oral, Daily  oxybutynin XL, 5 mg, Oral, Daily  pantoprazole, 40 mg, Oral, Q AM  remdesivir, 100 mg, Intravenous, Q24H  rosuvastatin, 20 mg, Oral, Daily  sodium chloride, 10 mL, Intravenous, Q12H  vancomycin, 20 mg/kg, Intravenous, Q24H      Pharmacy to dose vancomycin,   sodium chloride, 100 mL/hr, Last Rate: 100 mL/hr (10/25/23 1400)        Objective     Vital Signs  Vital Sign Min/Max for last 24 hours  Temp  Min: 97 °F (36.1 °C)  Max: 98.3 °F (36.8 °C)   BP  Min: 127/66  Max: 169/90   Pulse  Min: 83  Max: 98   Resp  Min: 16  Max: 32   SpO2  Min: 89 %  Max: 99 %   Flow (L/min)  Min: 3  Max: 60   No data recorded       Intake/Output Summary (Last 24 hours) at 10/26/2023 0741  Last data filed at 10/26/2023 0600  Gross per 24 hour   Intake 2447 ml   Output 1300 ml   Net 1147 ml     No intake/output data recorded.  Last Weight and Admission Weight        10/23/23  0536   Weight: 78.8 kg (173 lb 12.8 oz)     Flowsheet Rows      Flowsheet Row First Filed Value   Admission Height 172.7 cm (68\") Documented at 10/23/2023 0536   Admission Weight 78.8 kg (173 lb 12.8 oz) Documented at 10/23/2023 0536            Body mass index is 26.43 kg/m².           Physical Exam:    General Appearance: Elderly white male looks even older than his stated age disheveled looks chronically ill poorly kept and a looks acutely ill as well but does not look in the distress he did yesterday  Eyes: Conjunctiva are clear and anicteric pupils are equal  ENT: Mucous membranes are very dry no erythema he has some thick white exudate on his left posterior pharynx  Neck: No palpable adenopathy or thyromegaly no visible jugular venous distention or hepatojugular reflux, trachea midline  Lungs: Sounds much better just a few coarse rales in the left base today the right is clear no definite dullness.  He " is not using accessory muscles today  Cardiac: Regular rate and rhythm heart rates in the 80s and low 90s now sinus on the monitor no murmur  Abdomen: Soft no palpable hepatosplenomegaly or masses  : Not examined  Musculoskeletal: No real change from yesterday  Skin: Warm and dry no jaundice no diaphoresis no petechiae  Neuro: A little more talkative today but not a whole lot he is following simple commands  Extremities/P Vascular: No clubbing no cyanosis no edema palpable radial and dorsalis pedis pulses  MSE: No pretty flat affect but he is far more engaging today    Labs:  Results from last 7 days   Lab Units 10/26/23  0229 10/25/23  0446 10/24/23  0443 10/23/23  0548 10/20/23  1329   GLUCOSE mg/dL 136* 124* 84 138*  --    SODIUM mmol/L 138 130* 132* 136  --    POTASSIUM mmol/L 4.6 3.8 4.6 4.2  --    MAGNESIUM mg/dL 2.0 1.7 2.0  --   --    CO2 mmol/L 18.6* 18.3* 21.5* 23.0  --    CHLORIDE mmol/L 108* 97* 99 102  --    ANION GAP mmol/L 11.4 14.7 11.5 11.0  --    CREATININE mg/dL 0.92 1.05 0.97 1.00  --    BUN mg/dL 40* 21 15 17  --    BUN / CREAT RATIO  43.5* 20.0 15.5 17.0  --    CALCIUM mg/dL 7.9* 8.4* 8.3* 9.0  --    ALK PHOS U/L 217* 230* 204* 191*  --    TOTAL PROTEIN g/dL 5.7* 6.3 6.3 6.6  --    ALT (SGPT) U/L 210* 86* 55* 31  --    AST (SGOT) U/L 391* 194* 97* 49*  --    BILIRUBIN mg/dL 0.6 1.2 0.8 0.7  --    ALBUMIN g/dL 3.0* 3.5 3.6 3.9 3.7   GLOBULIN gm/dL 2.7 2.8 2.7 2.7  --    A/G RATIO   --   --   --   --  1.2     Estimated Creatinine Clearance: 73.8 mL/min (by C-G formula based on SCr of 0.92 mg/dL).      Results from last 7 days   Lab Units 10/26/23  0229 10/25/23  0446 10/24/23  0443 10/23/23  0507 10/20/23  1326   WBC 10*3/mm3 16.18* 3.99 4.18 8.40 8.04   RBC 10*6/mm3 3.10* 3.47* 3.07* 3.41* 3.32*   HEMOGLOBIN g/dL 12.2* 13.2 11.9* 13.1 13.1   HEMATOCRIT % 34.1* 38.0 34.6* 38.9 40.0   MCV fL 110.0* 109.5* 112.7* 114.1* 120.5*   MCH pg 39.4* 38.0* 38.8* 38.4* 39.5*   MCHC g/dL 35.8* 34.7 34.4  33.7 32.8   RDW % 12.6 12.5 12.6 12.9 12.8   RDW-SD fl 51.5 49.3 51.7 55.1* 57.7*   MPV fL 10.8 10.9 10.9 10.3 10.0   PLATELETS 10*3/mm3 145 133* 159 208 247   NEUTROPHIL % %  --   --   --   --  67.5   LYMPHOCYTE % %  --   --   --   --  17.3*   MONOCYTES % %  --   --   --   --  10.9   EOSINOPHIL % %  --   --   --   --  1.0   BASOPHIL % %  --   --   --   --  0.2   IMM GRAN % %  --   --   --   --  3.1*   NEUTROS ABS 10*3/mm3 14.72* 3.67 2.98 7.47* 5.42   LYMPHS ABS 10*3/mm3  --   --   --   --  1.39   MONOS ABS 10*3/mm3  --   --   --   --  0.88   EOS ABS 10*3/mm3  --   --   --   --  0.08   BASOS ABS 10*3/mm3  --   --   --   --  0.02   IMMATURE GRANS (ABS) 10*3/mm3  --   --   --   --  0.25*   NRBC /100 WBC 0.1  --   --  0.0 0.0     Results from last 7 days   Lab Units 10/25/23  0412   PH, ARTERIAL pH units 7.523*   PO2 ART mm Hg 61.0*   PCO2, ARTERIAL mm Hg 26.0*   HCO3 ART mmol/L 21.4*     Results from last 7 days   Lab Units 10/23/23  0828 10/23/23  0548   CK TOTAL U/L  --  158   HSTROP T ng/L 29* 25*             Results from last 7 days   Lab Units 10/25/23  1456 10/25/23  1041 10/25/23  0732 10/25/23  0446 10/23/23  0507   LACTATE mmol/L 1.9 4.4* 4.5* 3.4* 1.7   PROCALCITONIN ng/mL  --   --   --  3.88*  --          Microbiology Results (last 10 days)       Procedure Component Value - Date/Time    MRSA Screen, PCR (Inpatient) - Swab, Nares [[id number]]  (Normal) Collected: 10/25/23 1115    Lab Status: Final result Specimen: Swab from Nares Updated: 10/25/23 1308     MRSA PCR No MRSA Detected    Narrative:      The negative predictive value of this diagnostic test is high and should only be used to consider de-escalating anti-MRSA therapy. A positive result may indicate colonization with MRSA and must be correlated clinically.    Blood Culture - Blood, Arm, Right [[id number]]  (Normal) Collected: 10/25/23 0457    Lab Status: Preliminary result Specimen: Blood from Arm, Right Updated: 10/26/23 0515     Blood Culture No  growth at 24 hours    Blood Culture - Blood, Arm, Left [[id number]]  (Normal) Collected: 10/25/23 0446    Lab Status: Preliminary result Specimen: Blood from Arm, Left Updated: 10/26/23 0515     Blood Culture No growth at 24 hours    Respiratory Panel PCR w/COVID-19(SARS-CoV-2) [facility] In-House, NP Swab in UTM/VTM, 3-4 HR TAT - Swab, Nasopharynx [[id number]]  (Abnormal) Collected: 10/23/23 0650    Lab Status: Final result Specimen: Swab from Nasopharynx Updated: 10/23/23 0809     ADENOVIRUS, PCR Not Detected     Coronavirus 229E Not Detected     Coronavirus HKU1 Not Detected     Coronavirus NL63 Not Detected     Coronavirus OC43 Not Detected     COVID19 Detected     Human Metapneumovirus Not Detected     Human Rhinovirus/Enterovirus Not Detected     Influenza A PCR Not Detected     Influenza B PCR Not Detected     Parainfluenza Virus 1 Not Detected     Parainfluenza Virus 2 Not Detected     Parainfluenza Virus 3 Not Detected     Parainfluenza Virus 4 Not Detected     RSV, PCR Not Detected     Bordetella pertussis pcr Not Detected     Bordetella parapertussis PCR Not Detected     Chlamydophila pneumoniae PCR Not Detected     Mycoplasma pneumo by PCR Not Detected    Narrative:      In the setting of a positive respiratory panel with a viral infection PLUS a negative procalcitonin without other underlying concern for bacterial infection, consider observing off antibiotics or discontinuation of antibiotics and continue supportive care. If the respiratory panel is positive for atypical bacterial infection (Bordetella pertussis, Chlamydophila pneumoniae, or Mycoplasma pneumoniae), consider antibiotic de-escalation to target atypical bacterial infection.              Diagnostics:  XR Chest 1 View    Result Date: 10/25/2023  Portable chest x-ray  HISTORY: Increased oxygen need.  TECHNIQUE: Portable chest x-ray is provided and correlated with x-ray October 23, 2023. This image was acquired and an  exaggerated reverse lordotic configuration which partly obscures visualization of the lung bases.  FINDINGS: Sternal wires with new asymmetric density in the left mid and lower lung zones. The visualized right lung appears clear. Vascular volume is normal.      New opacity in the left lung is asymmetric and favored represent pneumonia.  This report was finalized on 10/25/2023 8:13 AM by Dr. Jose Antonio Ortega M.D on Workstation: [id number]      CT Head Without Contrast    Result Date: 10/24/2023  EMERGENCY CT SCAN OF THE HEAD WITHOUT CONTRAST ON 10/23/2023  CLINICAL HISTORY: Patient fell, left-sided head trauma, headache, patient is on anticoagulation.  TECHNIQUE: Spiral CT images were obtained from the base of the skull to the vertex without intravenous contrast. The images were reformatted and submitted in 3 mm thick axial, sagittal and coronal CT sections with brain algorithm and 2 mm thick axial CT sections with high-resolution bone algorithm.  This is correlated to prior head CT without contrast from [facility] on 02/19/2023 and a prior MRI of the brain on 03/16/2015.  FINDINGS: There is some patchy low-density extending from the periventricular into the subcortical white matter of the cerebral hemispheres consistent with mild-to-moderate small vessel disease. There is a small 3-4 mm old lacunar infarct in the inferior lateral right thalamus. There are tiny old lacunar infarcts in the anterior and mid left corona radiata region. There is a focal area of encephalomalacia involving the posterior superior left frontal lobe measuring 14 x 10 mm in size compatible with an old posterior superior left frontal infarct in the left MCA territory and this is unchanged dating back to an MRI of the brain on 03/16/2015. The remainder of the brain parenchyma is normal in attenuation. The ventricles are normal in size. I see no focal mass effect. There is no midline shift. No extra axial fluid collections are  identified. There is no evidence of acute intracranial hemorrhage. No acute skull fracture is identified. The calvarium and skull base are normal in appearance. There is a 3 mm calcific density in the subcutaneous fat of the scalp overlying the anterior inferior left frontal bone just above the left orbit, unchanged. The paranasal sinuses and the mastoid air cells and middle ear cavities are clear.      1. No acute intracranial abnormality is identified. 2. There is mild-to-moderate small vessel disease in the cerebral white matter. 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 3. The remainder of the head CT is within normal limits. Specifically, no acute skull fracture or intracranial hemorrhage is identified.  Radiation dose reduction techniques were utilized, including automated exposure control and exposure modulation based on body size.   This report was finalized on 10/24/2023 8:07 AM by Dr. Christiano Leon M.D on Workstation: [id number]      XR Chest 1 View    Result Date: 10/23/2023  EXAM: XR CHEST 1 VW-  INDICATION: Weakness  COMPARISON: Radiographs dating back to 3/10/2015       No focal consolidation. No pleural effusion or pneumothorax.  Normal size cardiomediastinal silhouette. Nondisplaced median sternotomy wires.   This report was finalized on 10/23/2023 7:51 AM by Dr. Alexander Causey M.D on Workstation: [id number]      Results for orders placed during the hospital encounter of 04/10/21    Adult Transthoracic Echo Complete w/ Color, Spectral and Contrast if Necessary Per Protocol    Interpretation Summary  · Estimated left ventricular EF = 57% Left ventricular systolic function is normal.  · Left ventricular diastolic function was normal.  · The right ventricular cavity is mildly  dilated.  · The right atrial cavity is mildly dilated.  · Mild mitral annular calcification is present. There is mild, bileaflet mitral valve thickening present. Trace mitral valve regurgitation is present. No significant mitral valve stenosis is present.  · No aortic valve regurgitation or stenosis is present. There is mild thickening of the aortic valve. The aortic valve appears trileaflet.          Assessment & Plan     COVID-19 infection is on remdesivir and dexamethasone with increased liver enzymes hold remdesivir continue Decadron  Pneumonia his initial x-ray was clear he has a left lower probably lobar infiltrate with elevated procalcitonin I think he has a secondary bacterial pneumonia on his COVID-19 infection.  MRSA screen was negative DC vancomycin continue cefepime follow cultures  Acute hypoxic respiratory failure secondary to #2 above is a former smoker had about a 15-pack-year history but quit a number of years ago and no known lung disease.  O2 requirements have improved significantly down to 6 L nasal cannula with plenty of room to wean  Hyponatremia resolved  Severe sepsis markedly improved  Lactic acidosis secondary to sepsis also markedly improved and normal lactate now  Elevated liver function test they have been rising since admission we see this commonly with COVID and COVID-related hepatitis but enzymes are getting significantly elevated now this could also be the remdesivir or remdesivir and his Crestor combined I am going to stop both we will continue to trend his liver function test  Hypertension by history blood pressure is adequate holding his antihypertensives still  Polycythemia vera he is on Hydrea with his sepsis we probably should hold that at least temporarily.  Furthermore he is actually a little anemic  Coronary artery disease with prior coronary bypass grafting  Lethargy improved probably little metabolic encephalopathy related to his sepsis yesterday        Patient has  improved significantly he still seconds going to take a while to recover from this but I think he can move out of the ICU back to the floor.    Saman Starr Jr, MD  10/26/23  07:41 EDT    Time: I spent over 45 minutes on patient's care today

## 2023-10-26 NOTE — CASE MANAGEMENT/SOCIAL WORK
Continued Stay Note  [facility]     Patient Name: Madhu Santoro  MRN: [id number]  Today's Date: 10/26/2023    Admit Date: 10/23/2023    Plan: Referral pending to [facility]   Discharge Plan       Row Name 10/26/23 0805       Plan    Plan Referral pending to [facility]    Plan Comments Patient transferred to the CICU 10/25. Patient currently on 6L high flow oxygen. CCP will continue to follow for discharge planning needs pending clinical course. LOLIS, ANTOINE                   Discharge Codes    No documentation.                 Expected Discharge Date and Time       Expected Discharge Date Expected Discharge Time    Oct 26, 2023               ANTOINE Sandra

## 2023-10-26 NOTE — PLAN OF CARE
Goal Outcome Evaluation:              Outcome Evaluation: Clinical swallow evaluation completed. RN at bedside throughout evaluation. Patient with wet vocal quality and breathing with difficulties clearing despite cued coughs/throat clears and swallowing. Decreased vocal intensity also observed. Patient using suction throughout evaluation, however, little to no output. Minimal trials completed. Consistent wet voice with x1 ice chip and x1 trial of puree. RN administered small med in trial of puree during evaluation. Decreased wet voice, however, subtle wetness persisted. Suspect reduced laryngeal elevation upon neck palpation. Recommend NPO. Meds via alternate route. Speech to follow for re-eval.

## 2023-10-26 NOTE — PLAN OF CARE
Goal Outcome Evaluation:  Plan of Care Reviewed With: patient        Progress: improving  Outcome Evaluation: Pt transferred to CICU for inc o2 needs, now back on 6S, on 4L o2, o2 sats >90% throughout. Pt is very weak, he needed moderate assist to sit up , stand, and take a few sidesteps along bed. He will cont to benefit from PT and I encouraged him to move around in bed as much as possible, get to a chair with nursing, and showed him some exercises he could do in bed, PT will cont to progress as tolerated

## 2023-10-27 LAB
A-TOCOPHEROL VIT E SERPL-MCNC: 7.1 MG/L (ref 9–29)
ALBUMIN SERPL-MCNC: 2.7 G/DL (ref 3.5–5.2)
ALBUMIN/GLOB SERPL: 1.1 G/DL
ALP SERPL-CCNC: 154 U/L (ref 39–117)
ALT SERPL W P-5'-P-CCNC: 134 U/L (ref 1–41)
ANION GAP SERPL CALCULATED.3IONS-SCNC: 7 MMOL/L (ref 5–15)
ANISOCYTOSIS BLD QL: ABNORMAL
AST SERPL-CCNC: 189 U/L (ref 1–40)
BILIRUB SERPL-MCNC: 0.5 MG/DL (ref 0–1.2)
BUN SERPL-MCNC: 34 MG/DL (ref 8–23)
BUN/CREAT SERPL: 45.3 (ref 7–25)
CALCIUM SPEC-SCNC: 8.2 MG/DL (ref 8.6–10.5)
CHLORIDE SERPL-SCNC: 114 MMOL/L (ref 98–107)
CO2 SERPL-SCNC: 21 MMOL/L (ref 22–29)
CREAT SERPL-MCNC: 0.75 MG/DL (ref 0.76–1.27)
CRP SERPL-MCNC: 6.92 MG/DL (ref 0–0.5)
D-LACTATE SERPL-SCNC: 1.2 MMOL/L (ref 0.5–2)
D-LACTATE SERPL-SCNC: 2.4 MMOL/L (ref 0.5–2)
DEPRECATED RDW RBC AUTO: 53.6 FL (ref 37–54)
EGFRCR SERPLBLD CKD-EPI 2021: 92.4 ML/MIN/1.73
ERYTHROCYTE [DISTWIDTH] IN BLOOD BY AUTOMATED COUNT: 12.7 % (ref 12.3–15.4)
GAMMA TOCOPHEROL SERPL-MCNC: 3 MG/L (ref 0.5–4.9)
GLOBULIN UR ELPH-MCNC: 2.5 GM/DL
GLUCOSE BLDC GLUCOMTR-MCNC: 127 MG/DL (ref 70–130)
GLUCOSE SERPL-MCNC: 120 MG/DL (ref 65–99)
HCT VFR BLD AUTO: 31.5 % (ref 37.5–51)
HGB BLD-MCNC: 10.6 G/DL (ref 13–17.7)
LYMPHOCYTES # BLD MANUAL: 0.74 10*3/MM3 (ref 0.7–3.1)
LYMPHOCYTES NFR BLD MANUAL: 2 % (ref 5–12)
MACROCYTES BLD QL SMEAR: ABNORMAL
MAGNESIUM SERPL-MCNC: 3.1 MG/DL (ref 1.6–2.4)
MCH RBC QN AUTO: 38 PG (ref 26.6–33)
MCHC RBC AUTO-ENTMCNC: 33.7 G/DL (ref 31.5–35.7)
MCV RBC AUTO: 112.9 FL (ref 79–97)
METAMYELOCYTES NFR BLD MANUAL: 1 % (ref 0–0)
MONOCYTES # BLD: 0.21 10*3/MM3 (ref 0.1–0.9)
MYELOCYTES NFR BLD MANUAL: 4 % (ref 0–0)
NEUTROPHILS # BLD AUTO: 9.06 10*3/MM3 (ref 1.7–7)
NEUTROPHILS NFR BLD MANUAL: 86 % (ref 42.7–76)
NRBC BLD AUTO-RTO: 0.1 /100 WBC (ref 0–0.2)
PHOSPHATE SERPL-MCNC: 2.2 MG/DL (ref 2.5–4.5)
PLAT MORPH BLD: NORMAL
PLATELET # BLD AUTO: 156 10*3/MM3 (ref 140–450)
PMV BLD AUTO: 10.9 FL (ref 6–12)
POTASSIUM SERPL-SCNC: 4.5 MMOL/L (ref 3.5–5.2)
PROT SERPL-MCNC: 5.2 G/DL (ref 6–8.5)
RBC # BLD AUTO: 2.79 10*6/MM3 (ref 4.14–5.8)
SODIUM SERPL-SCNC: 142 MMOL/L (ref 136–145)
VARIANT LYMPHS NFR BLD MANUAL: 7 % (ref 19.6–45.3)
WBC MORPH BLD: NORMAL
WBC NRBC COR # BLD: 10.54 10*3/MM3 (ref 3.4–10.8)

## 2023-10-27 PROCEDURE — 25810000003 SODIUM CHLORIDE 0.9 % SOLUTION: Performed by: HOSPITALIST

## 2023-10-27 PROCEDURE — 86140 C-REACTIVE PROTEIN: CPT | Performed by: INTERNAL MEDICINE

## 2023-10-27 PROCEDURE — 80053 COMPREHEN METABOLIC PANEL: CPT | Performed by: INTERNAL MEDICINE

## 2023-10-27 PROCEDURE — 25810000003 SODIUM CHLORIDE 0.9 % SOLUTION: Performed by: INTERNAL MEDICINE

## 2023-10-27 PROCEDURE — 25010000002 DEXAMETHASONE PER 1 MG: Performed by: INTERNAL MEDICINE

## 2023-10-27 PROCEDURE — 83605 ASSAY OF LACTIC ACID: CPT | Performed by: INTERNAL MEDICINE

## 2023-10-27 PROCEDURE — 92526 ORAL FUNCTION THERAPY: CPT

## 2023-10-27 PROCEDURE — 36415 COLL VENOUS BLD VENIPUNCTURE: CPT | Performed by: INTERNAL MEDICINE

## 2023-10-27 PROCEDURE — 85025 COMPLETE CBC W/AUTO DIFF WBC: CPT | Performed by: INTERNAL MEDICINE

## 2023-10-27 PROCEDURE — 83735 ASSAY OF MAGNESIUM: CPT | Performed by: INTERNAL MEDICINE

## 2023-10-27 PROCEDURE — 82948 REAGENT STRIP/BLOOD GLUCOSE: CPT

## 2023-10-27 PROCEDURE — 25010000002 CEFEPIME PER 500 MG: Performed by: INTERNAL MEDICINE

## 2023-10-27 PROCEDURE — 84100 ASSAY OF PHOSPHORUS: CPT | Performed by: INTERNAL MEDICINE

## 2023-10-27 RX ORDER — SODIUM PHOSPHATE IN 0.9 % NACL 15MMOL/100
15 PLASTIC BAG, INJECTION (ML) INTRAVENOUS ONCE
Qty: 250 ML | Refills: 0 | Status: COMPLETED | OUTPATIENT
Start: 2023-10-27 | End: 2023-10-27

## 2023-10-27 RX ADMIN — AMLODIPINE BESYLATE 2.5 MG: 5 TABLET ORAL at 15:38

## 2023-10-27 RX ADMIN — PHENOL 1 SPRAY: 1.5 LIQUID ORAL at 01:30

## 2023-10-27 RX ADMIN — APIXABAN 5 MG: 5 TABLET, FILM COATED ORAL at 20:12

## 2023-10-27 RX ADMIN — CEFEPIME 2000 MG: 2 INJECTION, POWDER, FOR SOLUTION INTRAVENOUS at 10:35

## 2023-10-27 RX ADMIN — CEFEPIME 2000 MG: 2 INJECTION, POWDER, FOR SOLUTION INTRAVENOUS at 01:30

## 2023-10-27 RX ADMIN — Medication 10 ML: at 10:35

## 2023-10-27 RX ADMIN — SODIUM CHLORIDE 100 ML/HR: 9 INJECTION, SOLUTION INTRAVENOUS at 04:19

## 2023-10-27 RX ADMIN — AMITRIPTYLINE HYDROCHLORIDE 50 MG: 50 TABLET, FILM COATED ORAL at 20:12

## 2023-10-27 RX ADMIN — CEFEPIME 2000 MG: 2 INJECTION, POWDER, FOR SOLUTION INTRAVENOUS at 16:54

## 2023-10-27 RX ADMIN — APIXABAN 5 MG: 5 TABLET, FILM COATED ORAL at 15:38

## 2023-10-27 RX ADMIN — SODIUM PHOSPHATE, MONOBASIC, MONOHYDRATE AND SODIUM PHOSPHATE, DIBASIC, ANHYDROUS 15 MMOL: 276; 142 INJECTION, SOLUTION INTRAVENOUS at 15:34

## 2023-10-27 RX ADMIN — PANTOPRAZOLE SODIUM 40 MG: 40 INJECTION, POWDER, FOR SOLUTION INTRAVENOUS at 06:23

## 2023-10-27 RX ADMIN — DEXAMETHASONE SODIUM PHOSPHATE 6 MG: 10 INJECTION INTRAMUSCULAR; INTRAVENOUS at 10:35

## 2023-10-27 NOTE — PLAN OF CARE
Goal Outcome Evaluation:              Outcome Evaluation: Re-eval of swallow completed. Wife at bedside. Patient continuing to have difficulties managing secretions as swallow and cough very weak resulting in wet breathing and wet vocal quality. Immediate coughing following ice chip trials. No further trials administered due to severity. Recommend NPO. Meds via alternate route. Consider temporary alternate means of nutrition. Speech to follow as appropriate.

## 2023-10-27 NOTE — PLAN OF CARE
Goal Outcome Evaluation:         Alert and oriented, hoarse, congested cough and coarse lung sounds, tolerating 3L NC, q2 turns, redness to buttocks, incontinence care provided. IV fluids and abx continued. Instruction given on incentive spirometer, patient demonstrates proper use. NPO. Oral care provided.

## 2023-10-27 NOTE — CONSULTS
Nutrition Services    Patient Name:  Madhu Santoro  YOB: 1945  MRN: [id number]  Admit Date:  10/23/2023    Assessment Date:  10/27/23    Summary: Consulted for Cortrak placement and TF Assessment:  Pt here after fall at home with COVID-19. SLP re-evaluated swallow today and pt having difficulties managing secretions as swallow and cough very weak, recommend NPO and alternative means of nutrition temporarily.   Cortrak placed at bedside @ 95cm (ND) and bridled in place.   Meds reviewed, decadron  Labs reviewed, Cl 114, Gluc 120, BUN 34, Crea 0.75, Phos 2.2 (replaced), Mg 3.1,   Coccyx pi- deep tissue  Pt with 13 lb wt loss x 1 week, no significant signs of fat/muscle wasting noted on NFPE.    Recommend:  Start TF's of Fibersource HN @ 25 ml/hr and increase 25 ml q 8 hrs as tolerates to goal rate of 65 ml/hr with 35 ml/hr free water flushes.   Advance diet as tolerates per SLP recommendations.     Will follow closely for tolerance and ability to advance diet.    CLINICAL NUTRITION ASSESSMENT      Reason for Assessment Cortrak Placement, Tube Feeding Assessment      Diagnosis/Problem   Fall at home, weakness, malaise, fatigue  Dx: COVID-19, HTN, PAD, chronic anticoagulation, Aflutter s/p ablation, s/p CABG, CAD. HTN, HLD, CVA, chronic pain, gout   Medical/Surgical History Past Medical History:   Diagnosis Date    Anxiety     Arthritis     Atrial flutter     Status post cavotricuspid isthmus ablation by Dr. Gustafson on 4/18/17    Mandel esophagus     Benign essential hypertension     CAD (coronary artery disease)     3 vessel CABG 4/11/17 by Dr. Cortez: ROSARIO-prox LAD, SVG-OM1, SVG-OM3    Carotid artery disease     Status post carotid endarterectomy - [facility] 4/10/17: 50-59% NICHELLE, 1-15% LICA.     Colonic polyp     Cyst of pancreas     DDD (degenerative disc disease), lumbosacral     GERD (gastroesophageal reflux disease)     H/O bone density study 2013    H/O complete eye exam 2014    History of  "kidney stone     HLD (hyperlipidemia)     Hypertension     Kidney stone     8/22/22    Lipid screening 05/31/2013    Low back pain     [facility]    Screening for prostate cancer 07/07/2015    Skin cancer     nose    Stroke     RESIDUAL--\"BALANCE ISSUES\"       Past Surgical History:   Procedure Laterality Date    CARDIAC CATHETERIZATION N/A 04/10/2017    Procedure: Left Heart Cath;  Surgeon: Marjorie Healy MD;  Location: [facility] CATH INVASIVE LOCATION;  Service:     CARDIAC CATHETERIZATION N/A 04/10/2017    Procedure: Coronary angiography;  Surgeon: Marjorie Healy MD;  [facility];  Service:     CARDIAC CATHETERIZATION N/A 04/10/2017    Procedure: Left ventriculography;  Surgeon: Marjorie Healy MD;  Location: [facility] CATH INVASIVE LOCATION;  Service:     CARDIAC CATHETERIZATION  2011    CARDIAC ELECTROPHYSIOLOGY PROCEDURE N/A 04/18/2017    Procedure: Ablation atrial flutter;  Surgeon: Jose Antonio Gustafson MD;  Location: [facility] CATH INVASIVE LOCATION;  Service:     CAROTID ENDARTERECTOMY      CHOLECYSTECTOMY      CHOLECYSTECTOMY WITH INTRAOPERATIVE CHOLANGIOGRAM N/A 09/07/2019    Procedure: Laparoscopic cholecystectomy with intraoperative cholangiogram;  Surgeon: Gauri Travis MD;  Location: [facility] MAIN OR;  Service: General    COLONOSCOPY  01/06/2015    Diverticulosis, one TA    COLONOSCOPY N/A 02/14/2019    tics, NBIH, adenomatous polyp x 2    COLONOSCOPY N/A 11/05/2021    Procedure: COLONOSCOPY TO CECUM AND TERM. ILEUM WITH COLD POLYPECTOMIES;  Surgeon: Everton Abel MD;  Location: [facility] ENDOSCOPY;  Service: Gastroenterology;  Laterality: N/A;  PRE OP - PERS H/O POLYPS  POST OP - COLON POLYPS,, DIVERTICULOSIS, HEMORRHOIDS    CORONARY ARTERY BYPASS GRAFT N/A 04/11/2017    Procedure: AR STERNOTOMY CORONARY ARTERY BYPASS GRAFT TIMES 3 USING LEFT INTERNAL MAMMARY ARTERY AND LEFT GREATER SAPHENOUS VEIN GRAFT PER ENDOSCOPIC VEIN HARVESTING AND PRP ;  Surgeon: " "Temo Cortez MD;  Location: [facility] MAIN OR;  Service:     ENDOSCOPY  01/06/2015    HH, Ervin's esophagus    ENDOSCOPY N/A 02/14/2019    Z line irregular, HH, Ervin's esophagus    ENDOSCOPY N/A 11/05/2021    Procedure: ESOPHAGOGASTRODUODENOSCOPY WITH BIOPSIES;  Surgeon: Everton Abel MD;  Location: [facility] ENDOSCOPY;  Service: Gastroenterology;  Laterality: N/A;  PRE OP - PERS H/O ERVIN'S  POST OP - IRREG Z LINE    KNEE SURGERY Left     URETEROSCOPY LASER LITHOTRIPSY WITH STENT INSERTION Left 8/23/2022    Procedure: Cysto retrograde with left uretro stent placement;  Surgeon: Damien Oliveira MD;  Location: [facility] MAIN OR;  Service: Urology;  Laterality: Left;    VASECTOMY          Anthropometrics        Current Height  Current Weight  BMI kg/m2 Height: 172.7 cm (68\")  Weight: 72.9 kg (160 lb 11.5 oz) (10/27/23 0417)  Body mass index is 24.44 kg/m².   Adjusted BMI (if applicable)    BMI Category Normal/Healthy (18.4 - 24.9)   Ideal Body Weight (IBW) 150 lb   Usual Body Weight (UBW) 170-174 lb   Weight Trend Loss, Amount/Timeframe: 13 lb (7.5%) wt loss x 1 week   Weight History Wt Readings from Last 30 Encounters:   10/27/23 0417 72.9 kg (160 lb 11.5 oz)   10/23/23 0536 78.8 kg (173 lb 12.8 oz)   10/20/23 1346 77.8 kg (171 lb 8 oz)   10/12/23 1148 79.3 kg (174 lb 12.8 oz)   10/03/23 1312 77.1 kg (170 lb)   09/21/23 1142 77.7 kg (171 lb 3.2 oz)   08/14/23 1144 76.4 kg (168 lb 6.4 oz)   07/11/23 1131 76.7 kg (169 lb)   06/30/23 1312 74.7 kg (164 lb 11.2 oz)   06/15/23 1300 75 kg (165 lb 4.8 oz)   06/12/23 1105 74.6 kg (164 lb 6.4 oz)   06/01/23 1401 74.6 kg (164 lb 8 oz)   05/18/23 0801 77.1 kg (169 lb 14.4 oz)   05/15/23 1356 75.3 kg (166 lb 1.6 oz)   05/01/23 1446 76.9 kg (169 lb 9.6 oz)   04/24/23 1458 77.6 kg (171 lb)   04/18/23 1546 78.5 kg (173 lb)   04/17/23 1505 78 kg (172 lb)   04/06/23 0925 78 kg (172 lb)   04/04/23 1054 77.6 kg (171 lb)   03/23/23 0837 82.1 kg (181 lb)   02/27/23 1319 81.6 " kg (180 lb)   02/19/23 1220 79.4 kg (175 lb)   02/08/23 0757 78.9 kg (174 lb)   02/06/23 1051 79.1 kg (174 lb 6.4 oz)   01/18/23 1334 79.6 kg (175 lb 8 oz)   01/12/23 1305 78.9 kg (174 lb)   01/09/23 0729 78 kg (172 lb)   01/05/23 0939 78.9 kg (174 lb)   12/05/22 1052 77.5 kg (170 lb 12.8 oz)   10/13/22 1845 79.4 kg (175 lb)      --  Estimated/Assessed Needs        Current Weight  Weight: 72.9 kg (160 lb 11.5 oz) (10/27/23 0417)       Energy Requirements    Weight for Calculation 73 kg   Method for Estimation  25 kcal/kg   EST Needs (kcal/day) 1825 kcals       Protein Requirements    Weight for Calculation 73 kg   EST Protein Needs (g/kg) 1.2 - 1.5 gm/kg   EST Daily Needs (g/day)  g       Fluid Requirements     Method for Estimation 30 mL/kg    EST Needs (mL/day) 2190 ml     Labs       Pertinent Labs    Results from last 7 days   Lab Units 10/27/23  0539 10/26/23  0229 10/25/23  0446   SODIUM mmol/L 142 138 130*   POTASSIUM mmol/L 4.5 4.6 3.8   CHLORIDE mmol/L 114* 108* 97*   CO2 mmol/L 21.0* 18.6* 18.3*   BUN mg/dL 34* 40* 21   CREATININE mg/dL 0.75* 0.92 1.05   CALCIUM mg/dL 8.2* 7.9* 8.4*   BILIRUBIN mg/dL 0.5 0.6 1.2   ALK PHOS U/L 154* 217* 230*   ALT (SGPT) U/L 134* 210* 86*   AST (SGOT) U/L 189* 391* 194*   GLUCOSE mg/dL 120* 136* 124*     Results from last 7 days   Lab Units 10/27/23  0540 10/27/23  0539 10/26/23  0229 10/25/23  1456 10/25/23  0446   MAGNESIUM mg/dL  --  3.1* 2.0  --  1.7   PHOSPHORUS mg/dL  --  2.2* 2.6   < > 2.1*   HEMOGLOBIN g/dL 10.6*  --  12.2*  --  13.2   HEMATOCRIT % 31.5*  --  34.1*  --  38.0   WBC 10*3/mm3 10.54  --  16.18*  --  3.99   ALBUMIN g/dL  --  2.7* 3.0*  --  3.5    < > = values in this interval not displayed.     Results from last 7 days   Lab Units 10/27/23  0540 10/26/23  0229 10/25/23  0446 10/24/23  0443 10/23/23  0507   PLATELETS 10*3/mm3 156 145 133* 159 208     COVID19   Date Value Ref Range Status   10/23/2023 Detected (C) Not Detected - Ref. Range Final      Lab Results   Component Value Date    HGBA1C 6.30 (H) 02/20/2023          Medications           Scheduled Medications [Held by provider] allopurinol, 300 mg, Oral, Daily  amitriptyline, 50 mg, Oral, Nightly  amLODIPine, 2.5 mg, Oral, Daily  apixaban, 5 mg, Oral, BID  cefepime, 2,000 mg, Intravenous, Q8H  dexAMETHasone, 6 mg, Intravenous, Daily  oxybutynin XL, 5 mg, Oral, Daily  pantoprazole, 40 mg, Intravenous, Q AM  sodium phosphate, 15 mmol, Intravenous, Once       Infusions     PRN Medications   acetaminophen **OR** acetaminophen **OR** acetaminophen    acetaminophen **OR** acetaminophen    benzocaine-menthol    senna-docusate sodium **AND** polyethylene glycol **AND** bisacodyl **AND** bisacodyl    guaifenesin    HYDROcodone Bit-Homatrop MBr    HYDROcodone-acetaminophen    nitroglycerin    ondansetron **OR** ondansetron    phenol    [COMPLETED] Insert Peripheral IV **AND** sodium chloride    sodium chloride     Physical Findings          General Findings alert, disoriented, impaired speech, on oxygen therapy, disoriented to time   Oral/Mouth Cavity dry mouth, tooth or teeth missing   Edema  lower extremity , 1+ (trace)   Gastrointestinal fecal incontinence, non-distended , normoactive, last bowel movement: 10/27   Skin  bruising, pressure injury: coccyx- deep tissue   Tubes/Drains/Lines Cortrak, ND tube, bridle in place   NFPE No clinical signs of muscle wasting or fat loss   --  Current Nutrition Orders & Evaluation of Intake       Oral Nutrition     Food Allergies NKFA   Current PO Diet No diet orders on file   Supplement n/a   PO Evaluation     % PO Intake N/a    Factors Affecting Intake: swallow impairment, weakness   --  PES STATEMENT / NUTRITION DIAGNOSIS      Nutrition Dx Problem  Problem: Inadequate Protein Energy Intake  Etiology: Factors Affecting Nutrition - swallow impairment    Signs/Symptoms: NPO and SLP/Swallow Evaluation     NUTRITION INTERVENTION / PLAN OF CARE      Intervention Goal(s)  Maintain nutrition status, Reduce/improve symptoms, Meet estimated needs, Disease management/therapy, Initiate TF/PN, Tolerate TF/PN at goal, Transition TF to PO, and Maintain weight         RD Intervention/Action Await initiation of EN/PN, Continue to monitor, Care plan reviewed, and Recommend/order: EN   --      Prescription/Orders:       PO Diet       Supplements       Enteral Nutrition    Enteral Prescription:     Enteral Route ND    TF Delivery Method Continuous    Enteral Product Fibersource HN    Modular None    Propofol Rate/Kcal     TF Start Rate  25 mL/hr    TF Goal Rate  65 mL/hr    Free Water Flush 35 mL Q 1 hr    Provision at Goal:          Calories 1872 kcal, meets 100 % needs         Protein  84 gm protein, meets 97 % needs         Fluid (mL) 1260 mL free water + 840 mL in flushes    Prescription Ordered Yes         Parenteral Nutrition    New Prescription Ordered? Yes   --      Monitor/Evaluation Per protocol   Discharge Plan/Needs Pending clinical course   --    RD to follow per protocol.      Electronically signed by:  Iliana Ceja RD  10/27/23 14:59 EDT

## 2023-10-27 NOTE — NURSING NOTE
CWON note: pt seen for evaluation of sacral skin. Pt is alert, but forgetful, Geronimo 13.  He is very weak requiring assistance of 2 to turn and reposition in bed, incontinent of bowel and bladder.  Per chart he fell at home and was on the floor for several hours prior to being brought to the hospital. I have ordered pt a VINNY mattress for adequate pressure redistribution.  He has blanchable redness to right buttocks. Gluteal cleft has linear partial thickness skin loss, surrounding skin has purple maroon discoloration with nonblanchable erythema extending to the left buttocks/ sacral skin, this entire area measures 6x 4cms. A sacral Optifoam dressing was placed. Charles heels with blanchable erythema, heels are off-loaded with pillows. Left ear with some discoloration under O2 tubing. Wound care and prevention standing orders have been placed into [facility]. Discussed all with RNMonica and charge RN. Pt will need to move to a larger room to accommodate his VINNY mattress. WOC team will follow up next week to reassess his skin.

## 2023-10-27 NOTE — PROGRESS NOTES
Patient Care Team:  Damian Sanchez MD as PCP - General (Family Medicine)  Jr Temo Cortez MD as Surgeon (Cardiothoracic Surgery)  Netta Mayorga Jr., MD as Consulting Physician (Vascular Surgery)  Damian Sanchez MD as Referring Physician (Family Medicine)  Aquiles Lopez MD as Consulting Physician (Hematology and Oncology)  Christy Luther APRN as Nurse Practitioner (Nurse Practitioner)  Damian Sanchez MD as Referring Physician (Family Medicine)      Subjective     Patient is a 77 y.o. male.  Following for respiratory failure severe sepsis secondary to COVID-19 infection and superimposed bacterial pneumonia left lung base.  Patient's main complaint today is sore throat still he cannot swallow well in fact they are talking feeding tube for him.  He is having trouble coughing clearing sputum although the [place] suction is helping.  His breathing has improved he is not short of breath today and they have him down to 2 L nasal cannula O2 with oxygen saturations of 95 to 96%.      Review of Systems:        History  Past Medical History:   Diagnosis Date    Anxiety     Arthritis     Atrial flutter     Status post cavotricuspid isthmus ablation by Dr. Gustafson on 4/18/17    Mandel esophagus     Benign essential hypertension     CAD (coronary artery disease)     3 vessel CABG 4/11/17 by Dr. Cortez: ROSARIO-prox LAD, SVG-OM1, SVG-OM3    Carotid artery disease     Status post carotid endarterectomy - [facility] 4/10/17: 50-59% NICHELLE, 1-15% LICA.     Colonic polyp     Cyst of pancreas     DDD (degenerative disc disease), lumbosacral     GERD (gastroesophageal reflux disease)     H/O bone density study 2013    H/O complete eye exam 2014    History of kidney stone     HLD (hyperlipidemia)     Hypertension     Kidney stone     8/22/22    Lipid screening 05/31/2013    Low back pain     physical therapy [facility] rehab 5-12-10    Screening for prostate cancer 07/07/2015    Skin cancer     nose    Stroke      "RESIDUAL--\"BALANCE ISSUES\"     Past Surgical History:   Procedure Laterality Date    CARDIAC CATHETERIZATION N/A 04/10/2017    Procedure: Left Heart Cath;  Surgeon: Marjorie Healy MD;  Location: [facility] CATH INVASIVE LOCATION;  Service:     CARDIAC CATHETERIZATION N/A 04/10/2017    Procedure: Coronary angiography;  Surgeon: Marjorie Healy MD;  Location: [facility] CATH INVASIVE LOCATION;  Service:     CARDIAC CATHETERIZATION N/A 04/10/2017    Procedure: Left ventriculography;  Surgeon: Marjorie Healy MD;  Location: [facility] CATH INVASIVE LOCATION;  Service:     CARDIAC CATHETERIZATION  2011    CARDIAC ELECTROPHYSIOLOGY PROCEDURE N/A 04/18/2017    Procedure: Ablation atrial flutter;  Surgeon: Jose Antonio Gustafson MD;  Location: [facility] CATH INVASIVE LOCATION;  Service:     CAROTID ENDARTERECTOMY      CHOLECYSTECTOMY      CHOLECYSTECTOMY WITH INTRAOPERATIVE CHOLANGIOGRAM N/A 09/07/2019    Procedure: Laparoscopic cholecystectomy with intraoperative cholangiogram;  Surgeon: Gauri Travis MD;  Location: [facility] MAIN OR;  Service: General    COLONOSCOPY  01/06/2015    Diverticulosis, one TA    COLONOSCOPY N/A 02/14/2019    tics, NBIH, adenomatous polyp x 2    COLONOSCOPY N/A 11/05/2021    Procedure: COLONOSCOPY TO CECUM AND TERM. ILEUM WITH COLD POLYPECTOMIES;  Surgeon: Everton Abel MD;  Location: [facility] ENDOSCOPY;  Service: Gastroenterology;  Laterality: N/A;  PRE OP - PERS H/O POLYPS  POST OP - COLON POLYPS,, DIVERTICULOSIS, HEMORRHOIDS    CORONARY ARTERY BYPASS GRAFT N/A 04/11/2017    Procedure: AR STERNOTOMY CORONARY ARTERY BYPASS GRAFT TIMES 3 USING LEFT INTERNAL MAMMARY ARTERY AND LEFT GREATER SAPHENOUS VEIN GRAFT PER ENDOSCOPIC VEIN HARVESTING AND PRP ;  Surgeon: Temo Cortez MD;  Location: [facility] MAIN OR;  Service:     ENDOSCOPY  01/06/2015    HH, Mandel's esophagus    ENDOSCOPY N/A 02/14/2019    Z line irregular, HH, Mandel's esophagus    ENDOSCOPY N/A 11/05/2021    Procedure: ESOPHAGOGASTRODUODENOSCOPY " WITH BIOPSIES;  Surgeon: Everton Abel MD;  Location: [facility] ENDOSCOPY;  Service: Gastroenterology;  Laterality: N/A;  PRE OP - PERS H/O ERVIN'S  POST OP - IRREG Z LINE    KNEE SURGERY Left     URETEROSCOPY LASER LITHOTRIPSY WITH STENT INSERTION Left 2022    Procedure: Cysto retrograde with left uretro stent placement;  Surgeon: Damien Oliveira MD;  Location: [facility] MAIN OR;  Service: Urology;  Laterality: Left;    VASECTOMY       Social History     Socioeconomic History    Marital status:      Spouse name: Brooke    Years of education: 9th grade   Tobacco Use    Smoking status: Former     Packs/day: 1     Types: Cigarettes     Start date: 1959     Quit date: 2009     Years since quittin.8    Smokeless tobacco: Never    Tobacco comments:     CAFFEINE USE   Vaping Use    Vaping Use: Never used   Substance and Sexual Activity    Alcohol use: Not Currently     Comment: rarely    Drug use: No    Sexual activity: Defer     Partners: Female     Family History   Problem Relation Age of Onset    Hypertension Mother     Heart disease Mother     Heart attack Mother     Stroke Mother     Heart disease Father     Heart attack Father     Stroke Father     Hypertension Sister     Heart attack Brother     Heart disease Brother     No Known Problems Brother     Heart disease Brother     Heart attack Brother     Diabetes Brother     No Known Problems Maternal Grandmother     No Known Problems Maternal Grandfather     No Known Problems Paternal Grandmother     No Known Problems Paternal Grandfather     Pancreatic cancer Paternal Cousin     Arthritis Other     Diabetes Other     Hypertension Other     Kidney disease Other         stones    Malig Hyperthermia Neg Hx          Allergies:  Atorvastatin and Penicillins    Medications:  Prior to Admission medications    Medication Sig Start Date End Date Taking? Authorizing Provider   allopurinol (Zyloprim) 300 MG tablet Take 1 tablet by mouth Daily.  7/11/23  Yes Damian Sanchez MD   amitriptyline (ELAVIL) 50 MG tablet TAKE ONE TABLET BY MOUTH ONCE NIGHTLY 9/28/23  Yes Mukesh Russell II, MD   amLODIPine (NORVASC) 2.5 MG tablet Take 1 tablet by mouth Daily. 1/12/23  Yes Kate Ayala APRN   apixaban (ELIQUIS) 5 MG tablet tablet Take 1 tablet by mouth 2 (Two) Times a Day. 1/12/23  Yes Kate Ayala APRN   HYDROcodone-acetaminophen (NORCO)  MG per tablet Take 1 tablet by mouth Every 8 (Eight) Hours As Needed for Severe Pain. 30 day supply 10/12/23  Yes Marisol Perales APRN   hydroxyurea (HYDREA) 500 MG capsule Take 1 capsule by mouth Every Other Day. 9/22/23  Yes Aquiles Lopez MD   losartan (Cozaar) 50 MG tablet Take 1 tablet by mouth Daily. 10/4/23  Yes Damian Sanchez MD   Myrbetriq 25 MG tablet sustained-release 24 hour 24 hr tablet Take 1 tablet by mouth Daily. 10/5/20  Yes Gary Garcia MD   potassium chloride 10 MEQ CR tablet TAKE ONE TABLET BY MOUTH DAILY 4/4/23  Yes Damian Sanchez MD   RABEprazole (Aciphex) 20 MG EC tablet Take 1 tablet by mouth Daily. 10/4/23  Yes Damian Sanchez MD   rosuvastatin (Crestor) 20 MG tablet Take 1 tablet by mouth Daily. 7/11/23  Yes Damian Sanchez MD   furosemide (LASIX) 20 MG tablet TAKE ONE TABLET BY MOUTH DAILY 4/4/23   Damian Sanchez MD   HYDROcodone-acetaminophen (NORCO) 5-325 MG per tablet Take 2 tablets by mouth Every 8 (Eight) Hours As Needed for Severe Pain. 30 day supply. DNF 10/19/23 10/17/23   Marisol Perales APRN   Ibuprofen 3 %, Gabapentin 10 %, Baclofen 2 %, lidocaine 4 %, Ketamine HCl 4 % Apply 1-2 g topically to the appropriate area as directed 3 (Three) to 4 (Four) times daily. 10/12/23 10/11/24  Marisol Perales APRN   mupirocin (BACTROBAN) 2 % cream Apply 1 application topically to the appropriate area as directed 2 (Two) Times a Day. 7/18/22   Elmo Owens APRN     [Held by provider] allopurinol, 300 mg, Oral, Daily  amitriptyline, 50 mg, Oral,  "Nightly  amLODIPine, 2.5 mg, Oral, Daily  apixaban, 5 mg, Oral, BID  cefepime, 2,000 mg, Intravenous, Q8H  dexAMETHasone, 6 mg, Intravenous, Daily  oxybutynin XL, 5 mg, Oral, Daily  pantoprazole, 40 mg, Intravenous, Q AM  sodium phosphate, 15 mmol, Intravenous, Once             Objective     Vital Signs  Vital Sign Min/Max for last 24 hours  Temp  Min: 97.3 °F (36.3 °C)  Max: 99 °F (37.2 °C)   BP  Min: 135/65  Max: 178/85   Pulse  Min: 80  Max: 102   Resp  Min: 16  Max: 20   SpO2  Min: 94 %  Max: 100 %   Flow (L/min)  Min: 3  Max: 6   Weight  Min: 72.9 kg (160 lb 11.5 oz)  Max: 72.9 kg (160 lb 11.5 oz)       Intake/Output Summary (Last 24 hours) at 10/27/2023 1011  Last data filed at 10/27/2023 0900  Gross per 24 hour   Intake 0 ml   Output 800 ml   Net -800 ml     No intake/output data recorded.  Last Weight and Admission Weight        10/27/23  0417   Weight: 72.9 kg (160 lb 11.5 oz)     Flowsheet Rows      Flowsheet Row First Filed Value   Admission Height 172.7 cm (68\") Documented at 10/23/2023 0536   Admission Weight 78.8 kg (173 lb 12.8 oz) Documented at 10/23/2023 0536            Body mass index is 24.44 kg/m².           Physical Exam:    General Appearance: Elderly white male looks even older than his stated age he does not look as bad today as he did the last couple of days he has better color today he is more interactive  Eyes: Conjunctiva are clear and anicteric pupils are equal  ENT: Mucous membranes are Weist today I do not see any erythema and no exudates  Neck: No palpable adenopathy or thyromegaly no visible jugular venous distention or hepatojugular reflux, trachea midline  Lungs:  better yet again today he has just minimal crackles in both bases no dullness nonlabored   Cardiac: Regular rate and rhythm   Abdomen: Soft no palpable hepatosplenomegaly or masses  : Not examined  Musculoskeletal: No real change from yesterday  Skin: Warm and dry no jaundice no diaphoresis no petechiae  Neuro: " Conversant and interactive today he is following commands briskly  Extremities/P Vascular: No clubbing no cyanosis no edema palpable radial and dorsalis pedis pulses  MSE: Far more interactive    Labs:  Results from last 7 days   Lab Units 10/27/23  0539 10/26/23  0229 10/25/23  0446 10/24/23  0443 10/23/23  0548 10/20/23  1329   GLUCOSE mg/dL 120* 136* 124* 84 138*  --    SODIUM mmol/L 142 138 130* 132* 136  --    POTASSIUM mmol/L 4.5 4.6 3.8 4.6 4.2  --    MAGNESIUM mg/dL 3.1* 2.0 1.7 2.0  --   --    CO2 mmol/L 21.0* 18.6* 18.3* 21.5* 23.0  --    CHLORIDE mmol/L 114* 108* 97* 99 102  --    ANION GAP mmol/L 7.0 11.4 14.7 11.5 11.0  --    CREATININE mg/dL 0.75* 0.92 1.05 0.97 1.00  --    BUN mg/dL 34* 40* 21 15 17  --    BUN / CREAT RATIO  45.3* 43.5* 20.0 15.5 17.0  --    CALCIUM mg/dL 8.2* 7.9* 8.4* 8.3* 9.0  --    ALK PHOS U/L 154* 217* 230* 204* 191*  --    TOTAL PROTEIN g/dL 5.2* 5.7* 6.3 6.3 6.6  --    ALT (SGPT) U/L 134* 210* 86* 55* 31  --    AST (SGOT) U/L 189* 391* 194* 97* 49*  --    BILIRUBIN mg/dL 0.5 0.6 1.2 0.8 0.7  --    ALBUMIN g/dL 2.7* 3.0* 3.5 3.6 3.9 3.7   GLOBULIN gm/dL 2.5 2.7 2.8 2.7 2.7  --    A/G RATIO   --   --   --   --   --  1.2     Estimated Creatinine Clearance: 83.7 mL/min (A) (by C-G formula based on SCr of 0.75 mg/dL (L)).      Results from last 7 days   Lab Units 10/27/23  0540 10/26/23  0229 10/25/23  0446 10/24/23  0443 10/23/23  0507 10/20/23  1326   WBC 10*3/mm3 10.54 16.18* 3.99 4.18 8.40 8.04   RBC 10*6/mm3 2.79* 3.10* 3.47* 3.07* 3.41* 3.32*   HEMOGLOBIN g/dL 10.6* 12.2* 13.2 11.9* 13.1 13.1   HEMATOCRIT % 31.5* 34.1* 38.0 34.6* 38.9 40.0   MCV fL 112.9* 110.0* 109.5* 112.7* 114.1* 120.5*   MCH pg 38.0* 39.4* 38.0* 38.8* 38.4* 39.5*   MCHC g/dL 33.7 35.8* 34.7 34.4 33.7 32.8   RDW % 12.7 12.6 12.5 12.6 12.9 12.8   RDW-SD fl 53.6 51.5 49.3 51.7 55.1* 57.7*   MPV fL 10.9 10.8 10.9 10.9 10.3 10.0   PLATELETS 10*3/mm3 156 145 133* 159 208 247   NEUTROPHIL % %  --   --   --   --    --  67.5   LYMPHOCYTE % %  --   --   --   --   --  17.3*   MONOCYTES % %  --   --   --   --   --  10.9   EOSINOPHIL % %  --   --   --   --   --  1.0   BASOPHIL % %  --   --   --   --   --  0.2   IMM GRAN % %  --   --   --   --   --  3.1*   NEUTROS ABS 10*3/mm3 9.06* 14.72* 3.67 2.98 7.47* 5.42   LYMPHS ABS 10*3/mm3  --   --   --   --   --  1.39   MONOS ABS 10*3/mm3  --   --   --   --   --  0.88   EOS ABS 10*3/mm3  --   --   --   --   --  0.08   BASOS ABS 10*3/mm3  --   --   --   --   --  0.02   IMMATURE GRANS (ABS) 10*3/mm3  --   --   --   --   --  0.25*   NRBC /100 WBC 0.1 0.1  --   --  0.0 0.0     Results from last 7 days   Lab Units 10/25/23  0412   PH, ARTERIAL pH units 7.523*   PO2 ART mm Hg 61.0*   PCO2, ARTERIAL mm Hg 26.0*   HCO3 ART mmol/L 21.4*     Results from last 7 days   Lab Units 10/23/23  0828 10/23/23  0548   CK TOTAL U/L  --  158   HSTROP T ng/L 29* 25*             Results from last 7 days   Lab Units 10/27/23  0540 10/27/23  0004 10/26/23  1738 10/25/23  1456 10/25/23  1041 10/25/23  0732 10/25/23  0446   LACTATE mmol/L 1.2 2.4* 2.0 1.9 4.4* 4.5* 3.4*   PROCALCITONIN ng/mL  --   --   --   --   --   --  3.88*         Microbiology Results (last 10 days)       Procedure Component Value - Date/Time    MRSA Screen, PCR (Inpatient) - Swab, Nares [[id number]]  (Normal) Collected: 10/25/23 1115    Lab Status: Final result Specimen: Swab from Nares Updated: 10/25/23 1308     MRSA PCR No MRSA Detected    Narrative:      The negative predictive value of this diagnostic test is high and should only be used to consider de-escalating anti-MRSA therapy. A positive result may indicate colonization with MRSA and must be correlated clinically.    Blood Culture - Blood, Arm, Right [[id number]]  (Normal) Collected: 10/25/23 0457    Lab Status: Preliminary result Specimen: Blood from Arm, Right Updated: 10/27/23 0515     Blood Culture No growth at 2 days    Blood Culture - Blood, Arm, Left [[id number]]  (Normal)  Collected: 10/25/23 0446    Lab Status: Preliminary result Specimen: Blood from Arm, Left Updated: 10/27/23 0515     Blood Culture No growth at 2 days    Respiratory Panel PCR w/COVID-19(SARS-CoV-2) DONTRELL/LESTER/KANCHAN/PAD/COR/MAD/BRIAN In-House, NP Swab in UTM/VTM, 3-4 HR TAT - Swab, Nasopharynx [[id number]]  (Abnormal) Collected: 10/23/23 0650    Lab Status: Final result Specimen: Swab from Nasopharynx Updated: 10/23/23 0809     ADENOVIRUS, PCR Not Detected     Coronavirus 229E Not Detected     Coronavirus HKU1 Not Detected     Coronavirus NL63 Not Detected     Coronavirus OC43 Not Detected     COVID19 Detected     Human Metapneumovirus Not Detected     Human Rhinovirus/Enterovirus Not Detected     Influenza A PCR Not Detected     Influenza B PCR Not Detected     Parainfluenza Virus 1 Not Detected     Parainfluenza Virus 2 Not Detected     Parainfluenza Virus 3 Not Detected     Parainfluenza Virus 4 Not Detected     RSV, PCR Not Detected     Bordetella pertussis pcr Not Detected     Bordetella parapertussis PCR Not Detected     Chlamydophila pneumoniae PCR Not Detected     Mycoplasma pneumo by PCR Not Detected    Narrative:      In the setting of a positive respiratory panel with a viral infection PLUS a negative procalcitonin without other underlying concern for bacterial infection, consider observing off antibiotics or discontinuation of antibiotics and continue supportive care. If the respiratory panel is positive for atypical bacterial infection (Bordetella pertussis, Chlamydophila pneumoniae, or Mycoplasma pneumoniae), consider antibiotic de-escalation to target atypical bacterial infection.              Diagnostics:  XR Chest 1 View    Result Date: 10/25/2023  Portable chest x-ray  HISTORY: Increased oxygen need.  TECHNIQUE: Portable chest x-ray is provided and correlated with x-ray October 23, 2023. This image was acquired and an exaggerated reverse lordotic configuration which partly obscures visualization of the  lung bases.  FINDINGS: Sternal wires with new asymmetric density in the left mid and lower lung zones. The visualized right lung appears clear. Vascular volume is normal.      New opacity in the left lung is asymmetric and favored represent pneumonia.  This report was finalized on 10/25/2023 8:13 AM by Dr. Jose Antonio Ortega M.D on Workstation: [id number]      CT Head Without Contrast    Result Date: 10/24/2023  EMERGENCY CT SCAN OF THE HEAD WITHOUT CONTRAST ON 10/23/2023  CLINICAL HISTORY: Patient fell, left-sided head trauma, headache, patient is on anticoagulation.  TECHNIQUE: Spiral CT images were obtained from the base of the skull to the vertex without intravenous contrast. The images were reformatted and submitted in 3 mm thick axial, sagittal and coronal CT sections with brain algorithm and 2 mm thick axial CT sections with high-resolution bone algorithm.  This is correlated to prior head CT without contrast [facility] on 02/19/2023 and a prior MRI of the brain on 03/16/2015.  FINDINGS: There is some patchy low-density extending from the periventricular into the subcortical white matter of the cerebral hemispheres consistent with mild-to-moderate small vessel disease. There is a small 3-4 mm old lacunar infarct in the inferior lateral right thalamus. There are tiny old lacunar infarcts in the anterior and mid left corona radiata region. There is a focal area of encephalomalacia involving the posterior superior left frontal lobe measuring 14 x 10 mm in size compatible with an old posterior superior left frontal infarct in the left MCA territory and this is unchanged dating back to an MRI of the brain on 03/16/2015. The remainder of the brain parenchyma is normal in attenuation. The ventricles are normal in size. I see no focal mass effect. There is no midline shift. No extra axial fluid collections are identified. There is no evidence of acute intracranial hemorrhage. No acute skull fracture  is identified. The calvarium and skull base are normal in appearance. There is a 3 mm calcific density in the subcutaneous fat of the scalp overlying the anterior inferior left frontal bone just above the left orbit, unchanged. The paranasal sinuses and the mastoid air cells and middle ear cavities are clear.      1. No acute intracranial abnormality is identified. 2. There is mild-to-moderate small vessel disease in the cerebral white matter. 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 3. The remainder of the head CT is within normal limits. Specifically, no acute skull fracture or intracranial hemorrhage is identified.  Radiation dose reduction techniques were utilized, including automated exposure control and exposure modulation based on body size.   This report was finalized on 10/24/2023 8:07 AM by Dr. Christiano Leon M.D on Workstation: [id number]      XR Chest 1 View    Result Date: 10/23/2023  EXAM: XR CHEST 1 VW-  INDICATION: Weakness  COMPARISON: Radiographs dating back to 3/10/2015       No focal consolidation. No pleural effusion or pneumothorax.  Normal size cardiomediastinal silhouette. Nondisplaced median sternotomy wires.   This report was finalized on 10/23/2023 7:51 AM by Dr. Alexander Causey M.D on Workstation: [id number]      Results for orders placed during the hospital encounter of 04/10/21    Adult Transthoracic Echo Complete w/ Color, Spectral and Contrast if Necessary Per Protocol    Interpretation Summary  · Estimated left ventricular EF = 57% Left ventricular systolic function is normal.  · Left ventricular diastolic function was normal.  · The right ventricular cavity is mildly dilated.  · The right atrial cavity is mildly dilated.  · Mild mitral annular calcification is  present. There is mild, bileaflet mitral valve thickening present. Trace mitral valve regurgitation is present. No significant mitral valve stenosis is present.  · No aortic valve regurgitation or stenosis is present. There is mild thickening of the aortic valve. The aortic valve appears trileaflet.          Assessment & Plan     COVID-19 infection is on remdesivir and dexamethasone with increased liver enzymes hold remdesivir continue Decadron  Pneumonia his initial x-ray was clear he has a left lower probably lobar infiltrate with elevated procalcitonin I think he has a secondary bacterial pneumonia on his COVID-19 infection.  MRSA screen was negative DC vancomycin continue cefepime follow cultures  Acute hypoxic respiratory failure secondary to #2 above is a former smoker had about a 15-pack-year history but quit a number of years ago and no known lung disease.  O2 requirements have improved significantly down to 6 L nasal cannula with plenty of room to wean  Hyponatremia resolved  Severe sepsis markedly improved  Lactic acidosis secondary to sepsis solved  Elevated liver function test persistently rising from admission admission we see this commonly with COVID and COVID-related hepatitis but enzymes yesterday were significantly elevated and held both remdesivir and Crestor that could be contributing and liver functions are much better today  Hypertension by history blood pressure is adequate holding his antihypertensives still  Polycythemia vera he is on Hydrea with his sepsis we probably should hold that at least temporarily.  Furthermore he is actually a little anemic  Coronary artery disease with prior coronary bypass grafting  Lethargy improved probably little metabolic encephalopathy related to his sepsis             Saman Starr Jr, MD  10/27/23  10:11 EDT    Time:

## 2023-10-27 NOTE — PROGRESS NOTES
"    DAILY PROGRESS NOTE  [facility]    Patient Identification:  Name: Madhu Santoro  Age: 77 y.o.  Sex: male  :  1945  MRN: [id number]         Primary Care Physician: Damian Sanchez MD    Subjective:  Interval History: His main complaint is his weakness.  I tried to reassure her that is the least of her worries given the fact that he was formally in the ICU.  He denies any emesis or diarrhea.  Admits to feeling tremendous weakness overall which is affecting his swallow reflex and he holds onto the Barney suction..  States speech tried ice today    Objective: Spouse at bedside.  All questions answered.  Patient elderly and frail but does not appear overtly toxic    Scheduled Meds:[Held by provider] allopurinol, 300 mg, Oral, Daily  amitriptyline, 50 mg, Oral, Nightly  amLODIPine, 2.5 mg, Oral, Daily  apixaban, 5 mg, Oral, BID  cefepime, 2,000 mg, Intravenous, Q8H  dexAMETHasone, 6 mg, Intravenous, Daily  oxybutynin XL, 5 mg, Oral, Daily  pantoprazole, 40 mg, Intravenous, Q AM  sodium phosphate, 15 mmol, Intravenous, Once      Continuous Infusions:     Vital signs in last 24 hours:  Temp:  [97.3 °F (36.3 °C)-99 °F (37.2 °C)] 97.9 °F (36.6 °C)  Heart Rate:  [] 80  Resp:  [16-20] 20  BP: (135-178)/(65-85) 166/74    Intake/Output:    Intake/Output Summary (Last 24 hours) at 10/27/2023 1013  Last data filed at 10/27/2023 0900  Gross per 24 hour   Intake 0 ml   Output 800 ml   Net -800 ml       Exam:  /74 (BP Location: Right arm, Patient Position: Lying)   Pulse 80   Temp 97.9 °F (36.6 °C) (Oral)   Resp 20   Ht 172.7 cm (68\")   Wt 72.9 kg (160 lb 11.5 oz)   SpO2 97%   BMI 24.44 kg/m²     General Appearance:    Alert, cooperative/follows commands, soft-spoken, AAOx3, lethargic and weak/frail appearing                          Head:    Normocephalic, without obvious abnormality, atraumatic                           Eyes:    PERRL, conjunctivae/corneas clear, EOM's intact, both " eyes                         Throat:   Oropharynx patent; oral mucosa pink and moist                           Neck:   Supple, no mass or LAD or JVD                         Lungs:    Diminished with rhonchi to auscultation bilaterally, respirations unlabored                 Chest Wall:    No tenderness or deformity                          Heart:    Regular rate and rhythm, S1 and S2 normal                  Abdomen:     Soft, nontender, bowel sounds active                 Extremities: Moving all while in bed/generalized weakness, no cyanosis or edema                        Pulses:   Pulses palpable in lower extremities                  Neurologic:   CNII-XII intact except for swallowing issue     Data Review:  Labs in chart were reviewed.    Assessment:  Active Hospital Problems    Diagnosis  POA    **COVID-19 [U07.1]  Yes    Polycythemia [D75.1]  Yes    Chronic anticoagulation [Z79.01]  Not Applicable    PAD (peripheral artery disease) [I73.9]  Yes    Stenosis of carotid artery [I65.29]  Yes    S/P ablation of atrial flutter [Z98.890, Z86.79]  Not Applicable    S/P CABG (coronary artery bypass graft) [Z95.1]  Not Applicable    Coronary artery disease due to lipid rich plaque [I25.10, I25.83]  Yes    HLD (hyperlipidemia) [E78.5]  Yes    Benign essential hypertension [I10]  Yes    Cerebrovascular accident [I63.9]  Yes      Resolved Hospital Problems   No resolved problems to display.       Plan:    COVID-pneumonia with secondary bacterial component and resolving sepsis   -Decadron D3   -Cefepime D3   -Elevated LFTs due to infection/reactive -remdesivir discontinued -LFTs trending down   -O2 requirements continue to improve -NC at 3 L   -Off Lasix currently      Dysphagia with normal diet prior to admission   -I would have anticipated better improvement with swallow today but still deemed n.p.o. per speech so core track ordered as we will initiate tube feeds over the weekend      A-fib with past history of ablation,  CAD/CABG, HTN   -RC-AC with Eliquis   -HTN stable despite not receiving meds since n.p.o. -systolics ranging 141-169    Past history left MCA CVA/HLD    Past history of chronic pain on [place] and Elavil    Replacement of low phosphorus noted      Disposition -if it were not for the dysphagia, I would be contemplating discharge to rehab today.  Since we are to place a core track, CCP can coordinate discharge needs but I anticipate he will remain here through the weekend hopefully we can advance diet back to an oral route by next week and then prepped for transfer to SNF   -Will see how clinical course unfolds    Everardo Sumner MD  10/27/2023  10:13 EDT

## 2023-10-27 NOTE — PLAN OF CARE
Problem: Aspiration (Enteral Nutrition)  Goal: Absence of Aspiration Signs and Symptoms  Outcome: Ongoing, Progressing     Problem: Device-Related Complication Risk (Enteral Nutrition)  Goal: Safe, Effective Therapy Delivery  Outcome: Ongoing, Progressing     Problem: Feeding Intolerance (Enteral Nutrition)  Goal: Feeding Tolerance  Outcome: Ongoing, Progressing   Goal Outcome Evaluation:   Cortrak placed at bedside @ 95cm (ND) and bridled in place. Start TF's of Fibersource HN @ 25 ml/hr and increase 25 ml q 8 hrs as tolerates to goal rate of 65 ml/hr with 35 ml/hr free water flushes. Will follow for tolerance and ability to transition to po diet once ok with SLP.

## 2023-10-27 NOTE — THERAPY RE-EVALUATION
Acute Care - Speech Language Pathology   Swallow Re-Evaluation [facility]     Patient Name: Madhu Santoro  : 1945  MRN: [id number]  Today's Date: 10/27/2023               Admit Date: 10/23/2023    Visit Dx:     ICD-10-CM ICD-9-CM   1. COVID-19  U07.1 079.89   2. Generalized weakness  R53.1 780.79   3. Fall at home, initial encounter  W19.XXXA E888.9    Y92.009 E849.0   4. Abrasion of left ear, initial encounter  S00.412A 910.0   5. Elevated troponin  R79.89 790.6     Patient Active Problem List   Diagnosis    Anxiety    Arthritis    Coronary artery disease due to lipid rich plaque    HLD (hyperlipidemia)    Benign essential hypertension    DDD (degenerative disc disease), lumbosacral    Cerebrovascular accident    Encounter for screening for cardiovascular disorders    Gastroesophageal reflux disease    Hyperlipidemia    Stenosis of carotid artery    Former smoker    History of cardiac catheterization    S/P ablation of atrial flutter    Typical atrial flutter    S/P CABG (coronary artery bypass graft)    Adenomatous polyp of ascending colon    Abdominal bloating    Stroke    PAD (peripheral artery disease)    Acute cholecystitis    Right upper quadrant abdominal pain    Chronic pain of both hips    Chronic bilateral low back pain without sciatica    Sacroiliac joint dysfunction of both sides    Encounter for long-term (current) use of high-risk medication    Lumbar facet arthropathy    Stroke-like symptoms    CVA (cerebral vascular accident)    Personal history of colonic polyps    Arthralgia of multiple joints    Iron deficiency    Thrombocytosis    Left ureteral stone    Obstructive uropathy    Chronic anticoagulation    Facial skin lesion    Iron deficiency anemia    Polycythemia    Neuropathy    Weakness    Pneumonia    Close exposure to COVID-19 virus    Polycythemia vera    Chronic gout without tophus    COVID-19     Past Medical History:   Diagnosis Date    Anxiety     Arthritis     Atrial  "flutter     Status post cavotricuspid isthmus ablation by Dr. Gustafson on 4/18/17    Mandel esophagus     Benign essential hypertension     CAD (coronary artery disease)     3 vessel CABG 4/11/17 by Dr. Cortez: ROSARIO-prox LAD, SVG-OM1, SVG-OM3    Carotid artery disease     Status post carotid endarterectomy - USG 4/10/17: 50-59% NICHELLE, 1-15% LICA.     Colonic polyp     Cyst of pancreas     DDD (degenerative disc disease), lumbosacral     GERD (gastroesophageal reflux disease)     H/O bone density study 2013    H/O complete eye exam 2014    History of kidney stone     HLD (hyperlipidemia)     Hypertension     Kidney stone     8/22/22    Lipid screening 05/31/2013    Low back pain     physical therapy [facility] 5-12-10    Screening for prostate cancer 07/07/2015    Skin cancer     nose    Stroke     RESIDUAL--\"BALANCE ISSUES\"     Past Surgical History:   Procedure Laterality Date    CARDIAC CATHETERIZATION N/A 04/10/2017    Procedure: Left Heart Cath;  Surgeon: Marjorie Healy MD;  Location: [facility] CATH INVASIVE LOCATION;  Service:     CARDIAC CATHETERIZATION N/A 04/10/2017    Procedure: Coronary angiography;  Surgeon: Marjorie Healy MD;  Location: [facility] CATH INVASIVE LOCATION;  Service:     CARDIAC CATHETERIZATION N/A 04/10/2017    Procedure: Left ventriculography;  Surgeon: Marjorie Healy MD;  Location: [facility] CATH INVASIVE LOCATION;  Service:     CARDIAC CATHETERIZATION  2011    CARDIAC ELECTROPHYSIOLOGY PROCEDURE N/A 04/18/2017    Procedure: Ablation atrial flutter;  Surgeon: Jose Antonio Gustafson MD;  Location: [facility] CATH INVASIVE LOCATION;  Service:     CAROTID ENDARTERECTOMY      CHOLECYSTECTOMY      CHOLECYSTECTOMY WITH INTRAOPERATIVE CHOLANGIOGRAM N/A 09/07/2019    Procedure: Laparoscopic cholecystectomy with intraoperative cholangiogram;  Surgeon: Gauri Travis MD;  Location: [facility] MAIN OR;  Service: General    COLONOSCOPY  01/06/2015    Diverticulosis, one TA    COLONOSCOPY N/A 02/14/2019    tics, " NBIH, adenomatous polyp x 2    COLONOSCOPY N/A 11/05/2021    Procedure: COLONOSCOPY TO CECUM AND TERM. ILEUM WITH COLD POLYPECTOMIES;  Surgeon: Everton Abel MD;  Location:  [facility];  Service: Gastroenterology;  Laterality: N/A;  PRE OP - PERS H/O POLYPS  POST OP - COLON POLYPS,, DIVERTICULOSIS, HEMORRHOIDS    CORONARY ARTERY BYPASS GRAFT N/A 04/11/2017    Procedure: AR STERNOTOMY CORONARY ARTERY BYPASS GRAFT TIMES 3 USING LEFT INTERNAL MAMMARY ARTERY AND LEFT GREATER SAPHENOUS VEIN GRAFT PER ENDOSCOPIC VEIN HARVESTING AND PRP ;  Surgeon: Temo Cortez MD;  Location: [facility] MAIN OR;  Service:     ENDOSCOPY  01/06/2015    HH, Ervin's esophagus    ENDOSCOPY N/A 02/14/2019    Z line irregular, HH, Ervin's esophagus    ENDOSCOPY N/A 11/05/2021    Procedure: ESOPHAGOGASTRODUODENOSCOPY WITH BIOPSIES;  Surgeon: Everton Abel MD;  Location:  [facility];  Service: Gastroenterology;  Laterality: N/A;  PRE OP - PERS H/O ERVIN'S  POST OP - IRREG Z LINE    KNEE SURGERY Left     URETEROSCOPY LASER LITHOTRIPSY WITH STENT INSERTION Left 8/23/2022    Procedure: Cysto retrograde with left uretro stent placement;  Surgeon: Damien Oliveira MD;  Location: [facility] MAIN OR;  Service: Urology;  Laterality: Left;    VASECTOMY         SLP Recommendation and Plan  SLP Swallowing Diagnosis: suspected pharyngeal dysphagia (10/27/23 0900)  SLP Diet Recommendation: NPO, temporary alternate methods of nutrition/hydration (10/27/23 0900)     SLP Rec. for Method of Medication Administration: meds via alternate route (10/27/23 0900)     Monitor for Signs of Aspiration: notify SLP if any concerns (10/27/23 0900)  Recommended Diagnostics: reassess via clinical swallow evaluation (10/27/23 0900)  Swallow Criteria for Skilled Therapeutic Interventions Met: demonstrates skilled criteria (10/27/23 0900)     Rehab Potential/Prognosis, Swallowing: re-evaluate goals as necessary (10/27/23 0900)  Therapy Frequency (Swallow):  PRN (10/27/23 0900)  Predicted Duration Therapy Intervention (Days): until discharge (10/27/23 0900)  Oral Care Recommendations: Oral Care BID/PRN (10/27/23 0900)                                      Oral Care Recommendations: Oral Care BID/PRN (10/27/23 0900)    Outcome Evaluation: Re-eval of swallow completed. Wife at bedside. Patient continuing to have difficulties managing secretions as swallow and cough very weak. Immediate coughing following ice chips. No further trials administered due to severity. Recommend NPO. Meds via alternate route. Consider temporary alternate means of nutrition. Speech to follow as appropriate.      SWALLOW EVALUATION (last 72 hours)       SLP Adult Swallow Evaluation       Row Name 10/27/23 0900 10/26/23 0900                Rehab Evaluation    Document Type re-evaluation  -CR evaluation  -CR       Subjective Information no complaints  -CR no complaints  -CR       Patient Observations alert;cooperative  -CR alert;cooperative  -CR       Patient Effort good  -CR good  -CR       Symptoms Noted During/After Treatment none  -CR none  -CR          General Information    Patient Profile Reviewed yes  -CR yes  -CR       Pertinent History Of Current Problem -- 78 y/o male admitted with generalized weakness and fall found to have COVID-19 and left mid-lower lobe PNA. Hx includes CVA.  -CR       Current Method of Nutrition -- other (see comments);regular textures;thin liquids  trays being held  -CR       Precautions/Limitations, Vision -- WFL;for purposes of eval  -CR       Precautions/Limitations, Hearing -- WFL;for purposes of eval  -CR       Prior Level of Function-Communication -- WFL  -CR       Prior Level of Function-Swallowing -- no diet consistency restrictions  -CR       Plans/Goals Discussed with -- patient;agreed upon  -CR       Barriers to Rehab -- medically complex  -CR          Pain    Additional Documentation -- Pain Scale: Numbers Pre/Post-Treatment (Group)  -CR          Pain  "Scale: Numbers Pre/Post-Treatment    Pre/Posttreatment Pain Comment sore throat  -CR \"Sore throat\"; pt did not rate pain  -CR          Oral Motor Structure and Function    Dentition Assessment -- natural, present and adequate  -CR       Secretion Management problems swallowing secretions;wet vocal quality;requires suctioning to control secretions  -CR problems swallowing secretions;wet vocal quality;requires suctioning to control secretions  -CR       Mucosal Quality moist, healthy  -CR moist, healthy  -CR          General Eating/Swallowing Observations    Respiratory Support Currently in Use -- high-flow nasal cannula  -CR       O2 Liters -- 6L  -CR          Clinical Swallow Eval    Clinical Swallow Evaluation Summary Re-eval of swallow completed. Wife at bedside. Patient continuing to have difficulties managing secretions as swallow and cough very weak resulting in wet breathing and wet vocal quality. Immediate coughing following ice chip trials. No further trials administered due to severity. Recommend NPO. Meds via alternate route. Consider temporary alternate means of nutrition. Speech to follow as appropriate.  -CR Clinical swallow evaluation completed. RN at bedside throughout evaluation. Patient with wet vocal quality and breathing with difficulties clearing despite cued coughs/throat clears and swallowing. Decreased vocal intensity also observed. Patient using suction throughout evaluation, however, little to no output. Minimal trials completed. Consistent wet voice with x1 ice chip and x1 trial of puree.  RN administered small med in trial of puree during evaluation. Decreased wet voice, however, subtle wetness persisted. Suspect reduced laryngeal elevation upon  neck palpation. Recommend NPO. Meds via alternate route. Speech to follow for re-eval.  -CR          SLP Evaluation Clinical Impression    SLP Swallowing Diagnosis suspected pharyngeal dysphagia  -CR suspected pharyngeal dysphagia  -CR       " Functional Impact risk of aspiration/pneumonia  -CR risk of aspiration/pneumonia  -CR       Rehab Potential/Prognosis, Swallowing re-evaluate goals as necessary  -CR re-evaluate goals as necessary  -CR       Swallow Criteria for Skilled Therapeutic Interventions Met demonstrates skilled criteria  -CR demonstrates skilled criteria  -CR          Recommendations    Therapy Frequency (Swallow) PRN  -CR PRN  -CR       Predicted Duration Therapy Intervention (Days) until discharge  -CR until discharge  -CR       SLP Diet Recommendation NPO;temporary alternate methods of nutrition/hydration  -CR NPO  -CR       Recommended Diagnostics reassess via clinical swallow evaluation  -CR reassess via clinical swallow evaluation  -CR       Oral Care Recommendations Oral Care BID/PRN  -CR Oral Care BID/PRN  -CR       SLP Rec. for Method of Medication Administration meds via alternate route  -CR meds via alternate route  -CR       Monitor for Signs of Aspiration notify SLP if any concerns  -CR yes;notify SLP if any concerns  -CR          Swallow Goals (SLP)    Swallow LTGs Patient will demonstrate functional swallow for  -CR Patient will demonstrate functional swallow for  -CR          (LTG) Patient will demonstrate functional swallow for    Diet Texture (Demonstrate functional swallow) soft to chew (chopped) textures  -CR soft to chew (chopped) textures  -CR       Liquid viscosity (Demonstrate functional swallow) thin liquids  -CR thin liquids  -CR       [place] (Demonstrate functional swallow) independently (over 90% accuracy)  -CR independently (over 90% accuracy)  -CR       Time Frame (Demonstrate functional swallow) by discharge  -CR by discharge  -CR       Progress/Outcomes (Demonstrate functional swallow) progress slower than expected  -CR new goal  -CR                 User Key  (r) = Recorded By, (t) = Taken By, (c) = Cosigned By      Initials Name Effective Dates    Adry Lentz, SLP 08/28/23 -                      EDUCATION  The patient has been educated in the following areas:   Dysphagia (Swallowing Impairment).        SLP GOALS       Row Name 10/27/23 0900 10/26/23 0900          (LTG) Patient will demonstrate functional swallow for    Diet Texture (Demonstrate functional swallow) soft to chew (chopped) textures  -CR soft to chew (chopped) textures  -CR     Liquid viscosity (Demonstrate functional swallow) thin liquids  -CR thin liquids  -CR     [place] (Demonstrate functional swallow) independently (over 90% accuracy)  -CR independently (over 90% accuracy)  -CR     Time Frame (Demonstrate functional swallow) by discharge  -CR by discharge  -CR     Progress/Outcomes (Demonstrate functional swallow) progress slower than expected  -CR new goal  -CR               User Key  (r) = Recorded By, (t) = Taken By, (c) = Cosigned By      Initials Name Provider Type    Adry Lentz SLP Speech and Language Pathologist                       Time Calculation:    Time Calculation- SLP       Row Name 10/27/23 0908             Time Calculation- SLP    SLP Start Time 0750  -CR      SLP Received On 10/27/23  -CR         Untimed Charges    65839-BS Treatment Swallow Minutes 60  -CR         Total Minutes    Untimed Charges Total Minutes 60  -CR       Total Minutes 60  -CR                User Key  (r) = Recorded By, (t) = Taken By, (c) = Cosigned By      Initials Name Provider Type    Adry Lentz SLP Speech and Language Pathologist                    Therapy Charges for Today       Code Description Service Date Service Provider Modifiers Qty    [id number] HC ST EVAL ORAL PHARYNG SWALLOW 4 10/26/2023 Adry Moise SLP GN 1    [id number] HC ST TREATMENT SWALLOW 4 10/27/2023 Adry Moise SLP GN 1                 GONZÁLEZ Camilo  10/27/2023

## 2023-10-28 LAB
ARSENIC BLD-MCNC: 3 UG/L (ref 0–9)
GLUCOSE BLDC GLUCOMTR-MCNC: 174 MG/DL (ref 70–130)
GLUCOSE BLDC GLUCOMTR-MCNC: 180 MG/DL (ref 70–130)
GLUCOSE BLDC GLUCOMTR-MCNC: 184 MG/DL (ref 70–130)
GLUCOSE BLDC GLUCOMTR-MCNC: 187 MG/DL (ref 70–130)
LEAD BLDV-MCNC: 2 UG/DL (ref 0–3.4)
MERCURY BLD-MCNC: <1 UG/L (ref 0–14.9)
PHOSPHATE SERPL-MCNC: 2 MG/DL (ref 2.5–4.5)

## 2023-10-28 PROCEDURE — 82948 REAGENT STRIP/BLOOD GLUCOSE: CPT

## 2023-10-28 PROCEDURE — 84100 ASSAY OF PHOSPHORUS: CPT | Performed by: HOSPITALIST

## 2023-10-28 PROCEDURE — 25010000002 DEXAMETHASONE PER 1 MG: Performed by: INTERNAL MEDICINE

## 2023-10-28 PROCEDURE — 25010000002 CEFEPIME PER 500 MG: Performed by: INTERNAL MEDICINE

## 2023-10-28 RX ADMIN — APIXABAN 5 MG: 5 TABLET, FILM COATED ORAL at 09:11

## 2023-10-28 RX ADMIN — OXYBUTYNIN CHLORIDE 5 MG: 5 TABLET, EXTENDED RELEASE ORAL at 09:11

## 2023-10-28 RX ADMIN — AMITRIPTYLINE HYDROCHLORIDE 50 MG: 50 TABLET, FILM COATED ORAL at 20:20

## 2023-10-28 RX ADMIN — APIXABAN 5 MG: 5 TABLET, FILM COATED ORAL at 20:20

## 2023-10-28 RX ADMIN — CEFEPIME 2000 MG: 2 INJECTION, POWDER, FOR SOLUTION INTRAVENOUS at 09:11

## 2023-10-28 RX ADMIN — CEFEPIME 2000 MG: 2 INJECTION, POWDER, FOR SOLUTION INTRAVENOUS at 00:07

## 2023-10-28 RX ADMIN — PANTOPRAZOLE SODIUM 40 MG: 40 INJECTION, POWDER, FOR SOLUTION INTRAVENOUS at 05:52

## 2023-10-28 RX ADMIN — AMLODIPINE BESYLATE 2.5 MG: 5 TABLET ORAL at 09:11

## 2023-10-28 RX ADMIN — DEXAMETHASONE SODIUM PHOSPHATE 6 MG: 10 INJECTION INTRAMUSCULAR; INTRAVENOUS at 09:11

## 2023-10-28 RX ADMIN — CEFEPIME 2000 MG: 2 INJECTION, POWDER, FOR SOLUTION INTRAVENOUS at 17:15

## 2023-10-28 NOTE — PROGRESS NOTES
"              Patient Care Team:  Damian Sanchez MD as PCP - General (Family Medicine)  Jr Temo Cortez MD as Surgeon (Cardiothoracic Surgery)  Netta Mayorga Jr., MD as Consulting Physician (Vascular Surgery)  Damian Sanchez MD as Referring Physician (Family Medicine)  Aquiles Lopez MD as Consulting Physician (Hematology and Oncology)  Christy Luther APRN as Nurse Practitioner (Nurse Practitioner)  Damian Sanchez MD as Referring Physician (Family Medicine)      Subjective     Patient is a 77 y.o. male.  Following for respiratory failure severe sepsis secondary to COVID-19 infection and superimposed bacterial pneumonia left lung base.  Patient continues to have pain in his throat and yesterday speech found to have severe dysphagia he has a feeding tube now this is irritating him a little bit.  He denies shortness of breath and he is on 1 L O2 he is coughing a lot has trouble coughing it out uses a suction      Review of Systems:        History  Past Medical History:   Diagnosis Date    Anxiety     Arthritis     Atrial flutter     Status post cavotricuspid isthmus ablation by Dr. Gustafson on 4/18/17    Mandel esophagus     Benign essential hypertension     CAD (coronary artery disease)     3 vessel CABG 4/11/17 by Dr. Cortez: ROSARIO-prox LAD, SVG-OM1, SVG-OM3    Carotid artery disease     Status post carotid endarterectomy - USG 4/10/17: 50-59% NICHELLE, 1-15% LICA.     Colonic polyp     Cyst of pancreas     DDD (degenerative disc disease), lumbosacral     GERD (gastroesophageal reflux disease)     H/O bone density study 2013    H/O complete eye exam 2014    History of kidney stone     HLD (hyperlipidemia)     Hypertension     Kidney stone     8/22/22    Lipid screening 05/31/2013    Low back pain     physical therapy [facility] rehab 5-12-10    Screening for prostate cancer 07/07/2015    Skin cancer     nose    Stroke     RESIDUAL--\"BALANCE ISSUES\"     Past Surgical History:   Procedure Laterality " Date    CARDIAC CATHETERIZATION N/A 04/10/2017    Procedure: Left Heart Cath;  Surgeon: Marjorie Healy MD;  Location: [facility] CATH INVASIVE LOCATION;  Service:     CARDIAC CATHETERIZATION N/A 04/10/2017    Procedure: Coronary angiography;  Surgeon: Marjoire Healy MD;  [facility];  Service:     CARDIAC CATHETERIZATION N/A 04/10/2017    Procedure: Left ventriculography;  Surgeon: Marjorie Healy MD;  Location: [facility] CATH INVASIVE LOCATION;  Service:     CARDIAC CATHETERIZATION  2011    CARDIAC ELECTROPHYSIOLOGY PROCEDURE N/A 04/18/2017    Procedure: Ablation atrial flutter;  Surgeon: Jose Antonio Gustafson MD;  Location: [facility] CATH INVASIVE LOCATION;  Service:     CAROTID ENDARTERECTOMY      CHOLECYSTECTOMY      CHOLECYSTECTOMY WITH INTRAOPERATIVE CHOLANGIOGRAM N/A 09/07/2019    Procedure: Laparoscopic cholecystectomy with intraoperative cholangiogram;  Surgeon: Gauri Travis MD;  Location: [facility] MAIN OR;  Service: General    COLONOSCOPY  01/06/2015    Diverticulosis, one TA    COLONOSCOPY N/A 02/14/2019    tics, NBIH, adenomatous polyp x 2    COLONOSCOPY N/A 11/05/2021    Procedure: COLONOSCOPY TO CECUM AND TERM. ILEUM WITH COLD POLYPECTOMIES;  Surgeon: Everton Abel MD;  Location: [facility] ENDOSCOPY;  Service: Gastroenterology;  Laterality: N/A;  PRE OP - PERS H/O POLYPS  POST OP - COLON POLYPS,, DIVERTICULOSIS, HEMORRHOIDS    CORONARY ARTERY BYPASS GRAFT N/A 04/11/2017    Procedure: AR STERNOTOMY CORONARY ARTERY BYPASS GRAFT TIMES 3 USING LEFT INTERNAL MAMMARY ARTERY AND LEFT GREATER SAPHENOUS VEIN GRAFT PER ENDOSCOPIC VEIN HARVESTING AND PRP ;  Surgeon: Temo Cortez MD;  Location: [facility] MAIN OR;  Service:     ENDOSCOPY  01/06/2015    HH, Mandel's esophagus    ENDOSCOPY N/A 02/14/2019    Z line irregular, HH, Mandel's esophagus    ENDOSCOPY N/A 11/05/2021    Procedure: ESOPHAGOGASTRODUODENOSCOPY WITH BIOPSIES;  Surgeon: Everton Abel MD;  Location: [facility] ENDOSCOPY;   Service: Gastroenterology;  Laterality: N/A;  PRE OP - PERS H/O ERVIN'S  POST OP - IRREG Z LINE    KNEE SURGERY Left     URETEROSCOPY LASER LITHOTRIPSY WITH STENT INSERTION Left 2022    Procedure: Cysto retrograde with left uretro stent placement;  Surgeon: Damien lOiveira MD;  Location: [facility] MAIN OR;  Service: Urology;  Laterality: Left;    VASECTOMY       Social History     Socioeconomic History    Marital status:      Spouse name: Brooke    Years of education: 9th grade   Tobacco Use    Smoking status: Former     Packs/day: 1     Types: Cigarettes     Start date: 1959     Quit date: 2009     Years since quittin.8    Smokeless tobacco: Never    Tobacco comments:     CAFFEINE USE   Vaping Use    Vaping Use: Never used   Substance and Sexual Activity    Alcohol use: Not Currently     Comment: rarely    Drug use: No    Sexual activity: Defer     Partners: Female     Family History   Problem Relation Age of Onset    Hypertension Mother     Heart disease Mother     Heart attack Mother     Stroke Mother     Heart disease Father     Heart attack Father     Stroke Father     Hypertension Sister     Heart attack Brother     Heart disease Brother     No Known Problems Brother     Heart disease Brother     Heart attack Brother     Diabetes Brother     No Known Problems Maternal Grandmother     No Known Problems Maternal Grandfather     No Known Problems Paternal Grandmother     No Known Problems Paternal Grandfather     Pancreatic cancer Paternal Cousin     Arthritis Other     Diabetes Other     Hypertension Other     Kidney disease Other         stones    Malig Hyperthermia Neg Hx          Allergies:  Atorvastatin and Penicillins    Medications:  Prior to Admission medications    Medication Sig Start Date End Date Taking? Authorizing Provider   allopurinol (Zyloprim) 300 MG tablet Take 1 tablet by mouth Daily. 23  Yes Damian Sanchez MD   amitriptyline (ELAVIL) 50 MG tablet TAKE ONE  TABLET BY MOUTH ONCE NIGHTLY 9/28/23  Yes Mukesh Russell II, MD   amLODIPine (NORVASC) 2.5 MG tablet Take 1 tablet by mouth Daily. 1/12/23  Yes Kate Ayala APRN   apixaban (ELIQUIS) 5 MG tablet tablet Take 1 tablet by mouth 2 (Two) Times a Day. 1/12/23  Yes Kate Ayala APRN   HYDROcodone-acetaminophen (NORCO)  MG per tablet Take 1 tablet by mouth Every 8 (Eight) Hours As Needed for Severe Pain. 30 day supply 10/12/23  Yes Marisol Perales APRN   hydroxyurea (HYDREA) 500 MG capsule Take 1 capsule by mouth Every Other Day. 9/22/23  Yes Aquiles Lopez MD   losartan (Cozaar) 50 MG tablet Take 1 tablet by mouth Daily. 10/4/23  Yes Damian Sanchez MD   Myrbetriq 25 MG tablet sustained-release 24 hour 24 hr tablet Take 1 tablet by mouth Daily. 10/5/20  Yes Gary Garcia MD   potassium chloride 10 MEQ CR tablet TAKE ONE TABLET BY MOUTH DAILY 4/4/23  Yes Damian Sanchez MD   RABEprazole (Aciphex) 20 MG EC tablet Take 1 tablet by mouth Daily. 10/4/23  Yes Damian Sanchez MD   rosuvastatin (Crestor) 20 MG tablet Take 1 tablet by mouth Daily. 7/11/23  Yes Damian Sanchez MD   furosemide (LASIX) 20 MG tablet TAKE ONE TABLET BY MOUTH DAILY 4/4/23   Damian Sanchez MD   HYDROcodone-acetaminophen (NORCO) 5-325 MG per tablet Take 2 tablets by mouth Every 8 (Eight) Hours As Needed for Severe Pain. 30 day supply. DNF 10/19/23 10/17/23   Marisol Perales APRN   Ibuprofen 3 %, Gabapentin 10 %, Baclofen 2 %, lidocaine 4 %, Ketamine HCl 4 % Apply 1-2 g topically to the appropriate area as directed 3 (Three) to 4 (Four) times daily. 10/12/23 10/11/24  Marisol Perales APRN   mupirocin (BACTROBAN) 2 % cream Apply 1 application topically to the appropriate area as directed 2 (Two) Times a Day. 7/18/22   Elmo Owens APRN     [Held by provider] allopurinol, 300 mg, Oral, Daily  amitriptyline, 50 mg, Oral, Nightly  amLODIPine, 2.5 mg, Oral, Daily  apixaban, 5 mg, Oral, BID  cefepime, 2,000 mg,  "Intravenous, Q8H  dexAMETHasone, 6 mg, Intravenous, Daily  oxybutynin XL, 5 mg, Oral, Daily  pantoprazole, 40 mg, Intravenous, Q AM             Objective     Vital Signs  Vital Sign Min/Max for last 24 hours  Temp  Min: 97.3 °F (36.3 °C)  Max: 97.9 °F (36.6 °C)   BP  Min: 142/75  Max: 166/85   Pulse  Min: 73  Max: 86   Resp  Min: 18  Max: 20   SpO2  Min: 94 %  Max: 97 %   Flow (L/min)  Min: 1  Max: 2   Weight  Min: 69.7 kg (153 lb 10.6 oz)  Max: 69.7 kg (153 lb 10.6 oz)       Intake/Output Summary (Last 24 hours) at 10/28/2023 1511  Last data filed at 10/27/2023 2015  Gross per 24 hour   Intake 4072 ml   Output --   Net 4072 ml     No intake/output data recorded.  Last Weight and Admission Weight        10/28/23  0454   Weight: 69.7 kg (153 lb 10.6 oz)     Flowsheet Rows      Flowsheet Row First Filed Value   Admission Height 172.7 cm (68\") Documented at 10/23/2023 0536   Admission Weight 78.8 kg (173 lb 12.8 oz) Documented at 10/23/2023 0536            Body mass index is 23.36 kg/m².           Physical Exam:    General Appearance: Elderly white male looks even older than his stated age does not appear in acute distress he sitting up in a chair on 1 L O2 with oxygen saturations of 95%  Eyes: Conjunctiva are clear and anicteric pupils are equal  ENT: Mucous membranes are dry he has a left nare nasoenteric tube  Neck: No palpable adenopathy or thyromegaly no visible jugular venous distention or hepatojugular reflux, trachea midline  Lungs: Chest is pretty clear today I do not hear any crackles wheezes or rales no dullness.  Cardiac: Regular rate and rhythm   Abdomen: Soft no palpable hepatosplenomegaly or masses  : Not examined  Musculoskeletal: No real change from yesterday  Skin: Warm and dry no jaundice no diaphoresis no petechiae  Neuro: Conversant and interactive today he is following commands briskly  Extremities/P Vascular: No clubbing no cyanosis no edema palpable radial and dorsalis pedis pulses  MSE: He " remained very interactive today    Labs:  Results from last 7 days   Lab Units 10/27/23  0539 10/26/23  0229 10/25/23  0446 10/24/23  0443 10/23/23  0548   GLUCOSE mg/dL 120* 136* 124* 84 138*   SODIUM mmol/L 142 138 130* 132* 136   POTASSIUM mmol/L 4.5 4.6 3.8 4.6 4.2   MAGNESIUM mg/dL 3.1* 2.0 1.7 2.0  --    CO2 mmol/L 21.0* 18.6* 18.3* 21.5* 23.0   CHLORIDE mmol/L 114* 108* 97* 99 102   ANION GAP mmol/L 7.0 11.4 14.7 11.5 11.0   CREATININE mg/dL 0.75* 0.92 1.05 0.97 1.00   BUN mg/dL 34* 40* 21 15 17   BUN / CREAT RATIO  45.3* 43.5* 20.0 15.5 17.0   CALCIUM mg/dL 8.2* 7.9* 8.4* 8.3* 9.0   ALK PHOS U/L 154* 217* 230* 204* 191*   TOTAL PROTEIN g/dL 5.2* 5.7* 6.3 6.3 6.6   ALT (SGPT) U/L 134* 210* 86* 55* 31   AST (SGOT) U/L 189* 391* 194* 97* 49*   BILIRUBIN mg/dL 0.5 0.6 1.2 0.8 0.7   ALBUMIN g/dL 2.7* 3.0* 3.5 3.6 3.9   GLOBULIN gm/dL 2.5 2.7 2.8 2.7 2.7     Estimated Creatinine Clearance: 80 mL/min (A) (by C-G formula based on SCr of 0.75 mg/dL (L)).      Results from last 7 days   Lab Units 10/27/23  0540 10/26/23  0229 10/25/23  0446 10/24/23  0443 10/23/23  0507   WBC 10*3/mm3 10.54 16.18* 3.99 4.18 8.40   RBC 10*6/mm3 2.79* 3.10* 3.47* 3.07* 3.41*   HEMOGLOBIN g/dL 10.6* 12.2* 13.2 11.9* 13.1   HEMATOCRIT % 31.5* 34.1* 38.0 34.6* 38.9   MCV fL 112.9* 110.0* 109.5* 112.7* 114.1*   MCH pg 38.0* 39.4* 38.0* 38.8* 38.4*   MCHC g/dL 33.7 35.8* 34.7 34.4 33.7   RDW % 12.7 12.6 12.5 12.6 12.9   RDW-SD fl 53.6 51.5 49.3 51.7 55.1*   MPV fL 10.9 10.8 10.9 10.9 10.3   PLATELETS 10*3/mm3 156 145 133* 159 208   NEUTROS ABS 10*3/mm3 9.06* 14.72* 3.67 2.98 7.47*   NRBC /100 WBC 0.1 0.1  --   --  0.0     Results from last 7 days   Lab Units 10/25/23  0412   PH, ARTERIAL pH units 7.523*   PO2 ART mm Hg 61.0*   PCO2, ARTERIAL mm Hg 26.0*   HCO3 ART mmol/L 21.4*     Results from last 7 days   Lab Units 10/23/23  0828 10/23/23  0548   CK TOTAL U/L  --  158   HSTROP T ng/L 29* 25*             Results from last 7 days   Lab Units  10/27/23  0540 10/27/23  0004 10/26/23  1738 10/25/23  1456 10/25/23  1041 10/25/23  0732 10/25/23  0446   LACTATE mmol/L 1.2 2.4* 2.0 1.9 4.4* 4.5* 3.4*   PROCALCITONIN ng/mL  --   --   --   --   --   --  3.88*         Microbiology Results (last 10 days)       Procedure Component Value - Date/Time    MRSA Screen, PCR (Inpatient) - Swab, Nares [[id number]]  (Normal) Collected: 10/25/23 1115    Lab Status: Final result Specimen: Swab from Nares Updated: 10/25/23 1308     MRSA PCR No MRSA Detected    Narrative:      The negative predictive value of this diagnostic test is high and should only be used to consider de-escalating anti-MRSA therapy. A positive result may indicate colonization with MRSA and must be correlated clinically.    Blood Culture - Blood, Arm, Right [[id number]]  (Normal) Collected: 10/25/23 0457    Lab Status: Preliminary result Specimen: Blood from Arm, Right Updated: 10/28/23 0515     Blood Culture No growth at 3 days    Blood Culture - Blood, Arm, Left [[id number]]  (Normal) Collected: 10/25/23 0446    Lab Status: Preliminary result Specimen: Blood from Arm, Left Updated: 10/28/23 0515     Blood Culture No growth at 3 days    Respiratory Panel PCR w/COVID-19(SARS-CoV-2) [facility]RE[facility]/LESTER/K[facility]CHAN/PAD/[facility]/MAD/BRIAN In-House, NP Swab in [facility], 3-4 HR TAT - Swab, Nasopharynx [[id number]]  (Abnormal) Collected: 10/23/23 0650    Lab Status: Final result Specimen: Swab from Nasopharynx Updated: 10/23/23 0809     ADENOVIRUS, PCR Not Detected     Coronavirus 229E Not Detected     Coronavirus HKU1 Not Detected     Coronavirus NL63 Not Detected     Coronavirus OC43 Not Detected     COVID19 Detected     Human Metapneumovirus Not Detected     Human Rhinovirus/Enterovirus Not Detected     Influenza A PCR Not Detected     Influenza B PCR Not Detected     Parainfluenza Virus 1 Not Detected     Parainfluenza Virus 2 Not Detected     Parainfluenza Virus 3 Not Detected     Parainfluenza Virus 4 Not Detected     RSV, PCR Not  Detected     Bordetella pertussis pcr Not Detected     Bordetella parapertussis PCR Not Detected     Chlamydophila pneumoniae PCR Not Detected     Mycoplasma pneumo by PCR Not Detected    Narrative:      In the setting of a positive respiratory panel with a viral infection PLUS a negative procalcitonin without other underlying concern for bacterial infection, consider observing off antibiotics or discontinuation of antibiotics and continue supportive care. If the respiratory panel is positive for atypical bacterial infection (Bordetella pertussis, Chlamydophila pneumoniae, or Mycoplasma pneumoniae), consider antibiotic de-escalation to target atypical bacterial infection.              Diagnostics:  XR Chest 1 View    Result Date: 10/25/2023  Portable chest x-ray  HISTORY: Increased oxygen need.  TECHNIQUE: Portable chest x-ray is provided and correlated with x-ray October 23, 2023. This image was acquired and an exaggerated reverse lordotic configuration which partly obscures visualization of the lung bases.  FINDINGS: Sternal wires with new asymmetric density in the left mid and lower lung zones. The visualized right lung appears clear. Vascular volume is normal.      New opacity in the left lung is asymmetric and favored represent pneumonia.  This report was finalized on 10/25/2023 8:13 AM by Dr. Jose Antonio Ortega M.D on Workstation: [id number]      CT Head Without Contrast    Result Date: 10/24/2023  EMERGENCY CT SCAN OF THE HEAD WITHOUT CONTRAST ON 10/23/2023  CLINICAL HISTORY: Patient fell, left-sided head trauma, headache, patient is on anticoagulation.  TECHNIQUE: Spiral CT images were obtained from the base of the skull to the vertex without intravenous contrast. The images were reformatted and submitted in 3 mm thick axial, sagittal and coronal CT sections with brain algorithm and 2 mm thick axial CT sections with high-resolution bone algorithm.  This is correlated to prior head CT without contrast from  [facility] on 02/19/2023 and a prior MRI of the brain on 03/16/2015.  FINDINGS: There is some patchy low-density extending from the periventricular into the subcortical white matter of the cerebral hemispheres consistent with mild-to-moderate small vessel disease. There is a small 3-4 mm old lacunar infarct in the inferior lateral right thalamus. There are tiny old lacunar infarcts in the anterior and mid left corona radiata region. There is a focal area of encephalomalacia involving the posterior superior left frontal lobe measuring 14 x 10 mm in size compatible with an old posterior superior left frontal infarct in the left MCA territory and this is unchanged dating back to an MRI of the brain on 03/16/2015. The remainder of the brain parenchyma is normal in attenuation. The ventricles are normal in size. I see no focal mass effect. There is no midline shift. No extra axial fluid collections are identified. There is no evidence of acute intracranial hemorrhage. No acute skull fracture is identified. The calvarium and skull base are normal in appearance. There is a 3 mm calcific density in the subcutaneous fat of the scalp overlying the anterior inferior left frontal bone just above the left orbit, unchanged. The paranasal sinuses and the mastoid air cells and middle ear cavities are clear.      1. No acute intracranial abnormality is identified. 2. There is mild-to-moderate small vessel disease in the cerebral white matter. 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 3. The remainder of the head CT is within normal limits. Specifically, no acute skull fracture or intracranial hemorrhage is identified.  Radiation dose reduction techniques were utilized, including automated exposure  control and exposure modulation based on body size.   This report was finalized on 10/24/2023 8:07 AM by Dr. Christiano Leon M.D on Workstation: [id number]      XR Chest 1 View    Result Date: 10/23/2023  EXAM: XR CHEST 1 VW-  INDICATION: Weakness  COMPARISON: Radiographs dating back to 3/10/2015       No focal consolidation. No pleural effusion or pneumothorax.  Normal size cardiomediastinal silhouette. Nondisplaced median sternotomy wires.   This report was finalized on 10/23/2023 7:51 AM by Dr. Alexander Causey M.D on Workstation: [id number]      Results for orders placed during the hospital encounter of 04/10/21    Adult Transthoracic Echo Complete w/ Color, Spectral and Contrast if Necessary Per Protocol    Interpretation Summary  · Estimated left ventricular EF = 57% Left ventricular systolic function is normal.  · Left ventricular diastolic function was normal.  · The right ventricular cavity is mildly dilated.  · The right atrial cavity is mildly dilated.  · Mild mitral annular calcification is present. There is mild, bileaflet mitral valve thickening present. Trace mitral valve regurgitation is present. No significant mitral valve stenosis is present.  · No aortic valve regurgitation or stenosis is present. There is mild thickening of the aortic valve. The aortic valve appears trileaflet.          Assessment & Plan     COVID-19 infection is on remdesivir and dexamethasone with increased liver enzymes hold remdesivir continue Decadron  Pneumonia his initial x-ray was clear he has a left lower probably lobar infiltrate with elevated procalcitonin I think he has a secondary bacterial pneumonia on his COVID-19 infection.  MRSA screen was negative and vancomycin discontinued on cefepime cultures thus far negative this may all be an aspiration pneumonitis plan complete 5 days antibiotics  Acute hypoxic respiratory failure secondary to #2 above is a former smoker had about a 15-pack-year history but quit a number of  years ago and no known lung disease.  O2 requirements have improved significantly down to 1 L nasal cannula .  Hyponatremia resolved  Severe sepsis markedly improved  Lactic acidosis secondary to sepsis solved  Elevated liver function test possibly COVID hepatitis but no rising rapidly and may have been related to remdesivir or combination remdesivir and Crestor both of those were stopped.  Hypertension by history blood pressure is adequate holding his antihypertensives still  Polycythemia vera he is on Hydrea with his sepsis we probably should hold that at least temporarily.  Furthermore he is actually a little anemic  Coronary artery disease with prior coronary bypass grafting  Lethargy improved probably little metabolic encephalopathy related to his sepsis  Oral pharyngeal dysphagia pretty severe n.p.o. now with feeding tube            Saman Starr Jr, MD  10/28/23  15:11 EDT    Time:

## 2023-10-28 NOTE — PROGRESS NOTES
"    DAILY PROGRESS NOTE  [facility]    Patient Identification:  Name: Madhu Santoro  Age: 77 y.o.  Sex: male  :  1945  MRN: [id number]         Primary Care Physician: Damian Sanchez MD    Subjective:  Interval History: Tolerating feeds thus far.  Not noted much change in overall clinical picture as opposed to yesterday.  Denies any new fever nausea and/or vomiting    Objective: Elderly and frail but seeming to make slow clinical improvement.  No family at bedside.    Scheduled Meds:[Held by provider] allopurinol, 300 mg, Oral, Daily  amitriptyline, 50 mg, Oral, Nightly  amLODIPine, 2.5 mg, Oral, Daily  apixaban, 5 mg, Oral, BID  cefepime, 2,000 mg, Intravenous, Q8H  dexAMETHasone, 6 mg, Intravenous, Daily  oxybutynin XL, 5 mg, Oral, Daily  pantoprazole, 40 mg, Intravenous, Q AM      Continuous Infusions:     Vital signs in last 24 hours:  Temp:  [97.3 °F (36.3 °C)-97.7 °F (36.5 °C)] 97.5 °F (36.4 °C)  Heart Rate:  [73-86] 73  Resp:  [18-20] 18  BP: (157-166)/(79-85) 166/85    Intake/Output:    Intake/Output Summary (Last 24 hours) at 10/28/2023 0911  Last data filed at 10/27/2023 2015  Gross per 24 hour   Intake 4112 ml   Output --   Net 4112 ml       Exam:  /85 (BP Location: Left arm, Patient Position: Lying)   Pulse 73   Temp 97.5 °F (36.4 °C) (Oral)   Resp 18   Ht 172.7 cm (68\")   Wt 69.7 kg (153 lb 10.6 oz)   SpO2 97%   BMI 23.36 kg/m²     General Appearance:    Alert, cooperative, sickly, AAOx3                         Throat: Core track in place; oral mucosa pink and moist                           Neck:   No JVD                         Lungs:    Diminished with rhonchi to auscultation bilaterally, respirations unlabored with conversation though quite soft-spoken                 Chest Wall:    No tenderness or deformity                          Heart:    Regular rate and rhythm, S1 and S2 normal                  Abdomen:     Soft, nontender, bowel sounds active            "      Extremities: Moving all with generalized weakness, no cyanosis or edema                  Neurologic:   CNII-XII intact     Data Review:  Labs in chart were reviewed.    Assessment:  Active Hospital Problems    Diagnosis  POA    **COVID-19 [U07.1]  Yes    Polycythemia [D75.1]  Yes    Chronic anticoagulation [Z79.01]  Not Applicable    PAD (peripheral artery disease) [I73.9]  Yes    Stenosis of carotid artery [I65.29]  Yes    S/P ablation of atrial flutter [Z98.890, Z86.79]  Not Applicable    S/P CABG (coronary artery bypass graft) [Z95.1]  Not Applicable    Coronary artery disease due to lipid rich plaque [I25.10, I25.83]  Yes    HLD (hyperlipidemia) [E78.5]  Yes    Benign essential hypertension [I10]  Yes    Cerebrovascular accident [I63.9]  Yes      Resolved Hospital Problems   No resolved problems to display.       Plan:    COVID-pneumonia with secondary bacterial component and resolving sepsis              -Decadron D4              -Cefepime D4              -Elevated LFTs due to infection/reactive -remdesivir discontinued -LFTs trending down              -O2 requirements continue to improve -NC at 3 L-1L              -Off Lasix currently        Dysphagia with normal diet prior to admission              -N.p.o. as per speech so core track ordered with tube feeds per nutrition, tolerated per patient thus far with hopes that early upcoming week, further clinical improvement is noted and we can perhaps advance diet p.o.       A-fib with past history of ablation, CAD/CABG, HTN              -RC-AC with Eliquis              -HTN stable      Past history left MCA CVA/HLD     Past history of chronic pain on [place] and Elavil     Replacement of low phosphorus noted        Disposition -tube feeds over the weekend -hopeful this will be short-term and perhaps can utilize oral in very near future as he is making clinical improvements daily   -CCP coordinating needs        Everardo Sumner MD  10/28/2023  09:11 EDT

## 2023-10-28 NOTE — PLAN OF CARE
Goal Outcome Evaluation:                   Disoriented to time.  Very weak.  On 1L NC.  Moved patient to 623 so that he could be moved to a low-air loss mattress bed.  Wife notified. Phos replaced.  Recheck in AM.  Bianca now in place with TF.

## 2023-10-28 NOTE — PLAN OF CARE
Problem: Skin Injury Risk Increased  Goal: Skin Health and Integrity  Outcome: Ongoing, Progressing  Intervention: Optimize Skin Protection  Recent Flowsheet Documentation  Taken 10/28/2023 0200 by Luisa Ozuna RN  Head of Bed ([facility]) Positioning: HOB elevated  Taken 10/27/2023 2200 by Luisa Ozuna RN  Head of Bed ([facility]) Positioning: HOB elevated  Taken 10/27/2023 2015 by Luisa Ozuna RN  Pressure Reduction Techniques:   heels elevated off bed   frequent weight shift encouraged   positioned off wounds  Head of Bed (HOB) Positioning: HOB elevated  Pressure Reduction Devices:   pressure-redistributing mattress utilized   specialty bed utilized  Skin Protection:   incontinence pads utilized   adhesive use limited   transparent dressing maintained   tubing/devices free from skin contact     Problem: Fall Injury Risk  Goal: Absence of Fall and Fall-Related Injury  Outcome: Ongoing, Progressing  Intervention: Promote Injury-Free Environment  Recent Flowsheet Documentation  Taken 10/28/2023 0200 by Luisa Ozuna RN  Safety Promotion/Fall Prevention:   safety round/check completed   nonskid shoes/slippers when out of bed   lighting adjusted   fall prevention program maintained   clutter free environment maintained   activity supervised  Taken 10/28/2023 0000 by Luisa Ozuna RN  Safety Promotion/Fall Prevention:   safety round/check completed   nonskid shoes/slippers when out of bed   lighting adjusted   fall prevention program maintained   clutter free environment maintained  Taken 10/27/2023 2200 by Luisa Ozuna RN  Safety Promotion/Fall Prevention:   safety round/check completed   nonskid shoes/slippers when out of bed   lighting adjusted   fall prevention program maintained   clutter free environment maintained   activity supervised  Taken 10/27/2023 2015 by Luisa Ozuna RN  Safety Promotion/Fall Prevention:   safety round/check completed   nonskid shoes/slippers when out of bed    lighting adjusted   fall prevention program maintained   clutter free environment maintained   activity supervised     Problem: Adult Inpatient Plan of Care  Goal: Plan of Care Review  Outcome: Ongoing, Progressing  Goal: Patient-Specific Goal (Individualized)  Outcome: Ongoing, Progressing  Goal: Absence of Hospital-Acquired Illness or Injury  Outcome: Ongoing, Progressing  Intervention: Identify and Manage Fall Risk  Recent Flowsheet Documentation  Taken 10/28/2023 0200 by Luisa Ozuna RN  Safety Promotion/Fall Prevention:   safety round/check completed   nonskid shoes/slippers when out of bed   lighting adjusted   fall prevention program maintained   clutter free environment maintained   activity supervised  Taken 10/28/2023 0000 by Luisa Ozuna RN  Safety Promotion/Fall Prevention:   safety round/check completed   nonskid shoes/slippers when out of bed   lighting adjusted   fall prevention program maintained   clutter free environment maintained  Taken 10/27/2023 2200 by Luisa Ozuna RN  Safety Promotion/Fall Prevention:   safety round/check completed   nonskid shoes/slippers when out of bed   lighting adjusted   fall prevention program maintained   clutter free environment maintained   activity supervised  Taken 10/27/2023 2015 by Luisa Ozuna RN  Safety Promotion/Fall Prevention:   safety round/check completed   nonskid shoes/slippers when out of bed   lighting adjusted   fall prevention program maintained   clutter free environment maintained   activity supervised  Intervention: Prevent Skin Injury  Recent Flowsheet Documentation  Taken 10/28/2023 0200 by Luisa Ozuna RN  Body Position: turned  Taken 10/27/2023 2200 by Luisa Ozuna RN  Body Position: sitting up in bed  Taken 10/27/2023 2015 by Luisa Ozuna RN  Body Position: sitting up in bed  Skin Protection:   incontinence pads utilized   adhesive use limited   transparent dressing maintained   tubing/devices free from  skin contact  Intervention: Prevent and Manage VTE (Venous Thromboembolism) Risk  Recent Flowsheet Documentation  Taken 10/28/2023 0200 by Luisa Ozuna RN  Activity Management: activity encouraged  Taken 10/28/2023 0000 by Luisa Ozuna RN  Activity Management: activity encouraged  Taken 10/27/2023 2200 by Luisa Ozuna RN  Activity Management: activity encouraged  Taken 10/27/2023 2015 by Luisa Ozuna RN  Activity Management: activity encouraged  VTE Prevention/Management:   bilateral   sequential compression devices on  Range of Motion: active ROM (range of motion) encouraged  Intervention: Prevent Infection  Recent Flowsheet Documentation  Taken 10/28/2023 0200 by Luisa Ozuna RN  Infection Prevention:   visitors restricted/screened   single patient room provided   rest/sleep promoted   personal protective equipment utilized   hand hygiene promoted  Taken 10/28/2023 0000 by Luisa Ozuna RN  Infection Prevention:   visitors restricted/screened   single patient room provided   rest/sleep promoted  Taken 10/27/2023 2200 by Luisa Ozuna RN  Infection Prevention:   visitors restricted/screened   single patient room provided   rest/sleep promoted   personal protective equipment utilized   hand hygiene promoted  Taken 10/27/2023 2015 by Luisa Ozuna RN  Infection Prevention:   single patient room provided   visitors restricted/screened   rest/sleep promoted   personal protective equipment utilized   hand hygiene promoted  Goal: Optimal Comfort and Wellbeing  Outcome: Ongoing, Progressing  Intervention: Provide Person-Centered Care  Recent Flowsheet Documentation  Taken 10/27/2023 2015 by Luisa Ozuna RN  Trust Relationship/Rapport:   care explained   choices provided   emotional support provided   empathic listening provided   questions answered  Goal: Readiness for Transition of Care  Outcome: Ongoing, Progressing     Problem: Infection  Goal: Absence of Infection Signs and  Symptoms  Outcome: Ongoing, Progressing  Intervention: Prevent or Manage Infection  Recent Flowsheet Documentation  Taken 10/27/2023 2200 by Luisa Ozuna RN  Isolation Precautions: precautions maintained  Taken 10/27/2023 2015 by Luisa Ozuna RN  Isolation Precautions: precautions maintained     Problem: Hypertension Comorbidity  Goal: Blood Pressure in Desired Range  Outcome: Ongoing, Progressing     Problem: Adjustment to Illness (Sepsis/Septic Shock)  Goal: Optimal Coping  Outcome: Ongoing, Progressing  Intervention: Optimize Psychosocial Adjustment to Illness  Recent Flowsheet Documentation  Taken 10/27/2023 2015 by Luisa Ozuna RN  Family/Support System Care:   support provided   self-care encouraged     Problem: Bleeding (Sepsis/Septic Shock)  Goal: Absence of Bleeding  Outcome: Ongoing, Progressing     Problem: Glycemic Control Impaired (Sepsis/Septic Shock)  Goal: Blood Glucose Level Within Desired Range  Outcome: Ongoing, Progressing  Intervention: Optimize Glycemic Control  Recent Flowsheet Documentation  Taken 10/27/2023 2015 by Luisa Ozuna RN  Glycemic Management: blood glucose monitored     Problem: Infection Progression (Sepsis/Septic Shock)  Goal: Absence of Infection Signs and Symptoms  Outcome: Ongoing, Progressing  Intervention: Initiate Sepsis Management  Recent Flowsheet Documentation  Taken 10/28/2023 0200 by Luisa Ozuna RN  Infection Prevention:   visitors restricted/screened   single patient room provided   rest/sleep promoted   personal protective equipment utilized   hand hygiene promoted  Taken 10/28/2023 0000 by Luisa Ozuna RN  Infection Prevention:   visitors restricted/screened   single patient room provided   rest/sleep promoted  Taken 10/27/2023 2200 by Luisa Ozuna RN  Infection Prevention:   visitors restricted/screened   single patient room provided   rest/sleep promoted   personal protective equipment utilized   hand hygiene promoted  Isolation  Precautions: precautions maintained  Taken 10/27/2023 2015 by Luisa Ozuna RN  Infection Prevention:   single patient room provided   visitors restricted/screened   rest/sleep promoted   personal protective equipment utilized   hand hygiene promoted  Isolation Precautions: precautions maintained  Intervention: Promote Recovery  Recent Flowsheet Documentation  Taken 10/28/2023 0200 by Luisa Ozuna, RN  Activity Management: activity encouraged  Taken 10/28/2023 0000 by Luisa Ozuna, RN  Activity Management: activity encouraged  Taken 10/27/2023 2200 by Luisa Ozuna RN  Activity Management: activity encouraged  Taken 10/27/2023 2015 by Luisa Ozuna RN  Activity Management: activity encouraged     Problem: Nutrition Impaired (Sepsis/Septic Shock)  Goal: Optimal Nutrition Intake  Outcome: Ongoing, Progressing     Problem: Aspiration (Enteral Nutrition)  Goal: Absence of Aspiration Signs and Symptoms  Outcome: Ongoing, Progressing  Intervention: Minimize Aspiration Risk  Recent Flowsheet Documentation  Taken 10/28/2023 0200 by Luisa Ozuna RN  Head of Bed (HOB) Positioning: HOB elevated  Taken 10/27/2023 2200 by Luisa Ozuna RN  Head of Bed (HOB) Positioning: HOB elevated  Taken 10/27/2023 2015 by Luisa Ozuna RN  Head of Bed (HOB) Positioning: HOB elevated     Problem: Device-Related Complication Risk (Enteral Nutrition)  Goal: Safe, Effective Therapy Delivery  Outcome: Ongoing, Progressing     Problem: Feeding Intolerance (Enteral Nutrition)  Goal: Feeding Tolerance  Outcome: Ongoing, Progressing   Goal Outcome Evaluation:

## 2023-10-29 LAB
GLUCOSE BLDC GLUCOMTR-MCNC: 178 MG/DL (ref 70–130)
GLUCOSE BLDC GLUCOMTR-MCNC: 181 MG/DL (ref 70–130)
GLUCOSE BLDC GLUCOMTR-MCNC: 182 MG/DL (ref 70–130)
GLUCOSE BLDC GLUCOMTR-MCNC: 203 MG/DL (ref 70–130)
GLUCOSE BLDC GLUCOMTR-MCNC: 275 MG/DL (ref 70–130)
GLUCOSE BLDC GLUCOMTR-MCNC: 286 MG/DL (ref 70–130)

## 2023-10-29 PROCEDURE — 25010000002 CEFEPIME PER 500 MG: Performed by: INTERNAL MEDICINE

## 2023-10-29 PROCEDURE — 25010000002 DEXAMETHASONE PER 1 MG: Performed by: INTERNAL MEDICINE

## 2023-10-29 PROCEDURE — 82948 REAGENT STRIP/BLOOD GLUCOSE: CPT

## 2023-10-29 PROCEDURE — 93010 ELECTROCARDIOGRAM REPORT: CPT | Performed by: INTERNAL MEDICINE

## 2023-10-29 PROCEDURE — 93005 ELECTROCARDIOGRAM TRACING: CPT | Performed by: HOSPITALIST

## 2023-10-29 RX ORDER — FUROSEMIDE 20 MG/1
20 TABLET ORAL DAILY
Status: DISCONTINUED | OUTPATIENT
Start: 2023-10-29 | End: 2023-10-29

## 2023-10-29 RX ORDER — FUROSEMIDE 20 MG/1
20 TABLET ORAL DAILY
Status: DISCONTINUED | OUTPATIENT
Start: 2023-10-30 | End: 2023-11-09 | Stop reason: HOSPADM

## 2023-10-29 RX ADMIN — AMITRIPTYLINE HYDROCHLORIDE 50 MG: 50 TABLET, FILM COATED ORAL at 21:23

## 2023-10-29 RX ADMIN — CEFEPIME 2000 MG: 2 INJECTION, POWDER, FOR SOLUTION INTRAVENOUS at 08:43

## 2023-10-29 RX ADMIN — FUROSEMIDE 20 MG: 20 TABLET ORAL at 11:57

## 2023-10-29 RX ADMIN — APIXABAN 5 MG: 5 TABLET, FILM COATED ORAL at 21:23

## 2023-10-29 RX ADMIN — CEFEPIME 2000 MG: 2 INJECTION, POWDER, FOR SOLUTION INTRAVENOUS at 17:51

## 2023-10-29 RX ADMIN — APIXABAN 5 MG: 5 TABLET, FILM COATED ORAL at 08:42

## 2023-10-29 RX ADMIN — CEFEPIME 2000 MG: 2 INJECTION, POWDER, FOR SOLUTION INTRAVENOUS at 01:59

## 2023-10-29 RX ADMIN — PANTOPRAZOLE SODIUM 40 MG: 40 INJECTION, POWDER, FOR SOLUTION INTRAVENOUS at 06:43

## 2023-10-29 RX ADMIN — DEXAMETHASONE SODIUM PHOSPHATE 6 MG: 10 INJECTION INTRAMUSCULAR; INTRAVENOUS at 08:42

## 2023-10-29 RX ADMIN — AMLODIPINE BESYLATE 2.5 MG: 5 TABLET ORAL at 08:43

## 2023-10-29 NOTE — PROGRESS NOTES
"              Patient Care Team:  Damian Sanchez MD as PCP - General (Family Medicine)  Jr Temo Cortez MD as Surgeon (Cardiothoracic Surgery)  Netta Mayorga Jr., MD as Consulting Physician (Vascular Surgery)  Damian Sanchez MD as Referring Physician (Family Medicine)  Aquiles Lopez MD as Consulting Physician (Hematology and Oncology)  Christy Luther APRN as Nurse Practitioner (Nurse Practitioner)  Damian Sanchez MD as Referring Physician (Family Medicine)      Subjective     Patient is a 77 y.o. male.  Following for respiratory failure severe sepsis secondary to COVID-19 infection and superimposed bacterial pneumonia left lung base.  Patient continues to have pain in his throat and yesterday speech found to have severe dysphagia he has a feeding tube now this is irritating him a little bit.  He denies shortness of breath and he is on 1 L O2, he is coughing a lot has trouble coughing it out uses a suction.  Patient is worried that he is even more hoarse today      Review of Systems:        History  Past Medical History:   Diagnosis Date    Anxiety     Arthritis     Atrial flutter     Status post cavotricuspid isthmus ablation by Dr. Gustafson on 4/18/17    Mandel esophagus     Benign essential hypertension     CAD (coronary artery disease)     3 vessel CABG 4/11/17 by Dr. Cortez: ROSARIO-prox LAD, SVG-OM1, SVG-OM3    Carotid artery disease     Status post carotid endarterectomy - [facility] 4/10/17: 50-59% NICHELLE, 1-15% LICA.     Colonic polyp     Cyst of pancreas     DDD (degenerative disc disease), lumbosacral     GERD (gastroesophageal reflux disease)     H/O bone density study 2013    H/O complete eye exam 2014    History of kidney stone     HLD (hyperlipidemia)     Hypertension     Kidney stone     8/22/22    Lipid screening 05/31/2013    Low back pain     physical therapy [facility] rehab 5-12-10    Screening for prostate cancer 07/07/2015    Skin cancer     nose    Stroke     RESIDUAL--\"BALANCE " "ISSUES\"     Past Surgical History:   Procedure Laterality Date    CARDIAC CATHETERIZATION N/A 04/10/2017    Procedure: Left Heart Cath;  Surgeon: Marjorie Healy MD;  Location: [facility] CATH INVASIVE LOCATION;  Service:     CARDIAC CATHETERIZATION N/A 04/10/2017    Procedure: Coronary angiography;  Surgeon: Marjorie Healy MD;  [facility] C[facility]AS[facility];  Service:     CARDIAC CATHETERIZATION N/A 04/10/2017    Procedure: Left ventriculography;  Surgeon: Marjorie Healy MD;  [facility] C[facility]ASIVE LOC[facility];  Service:     CARDIAC CATHETERIZATION  2011    CARDIAC ELECTROPHYSIOLOGY PROCEDURE N/A 04/18/2017    Procedure: Ablation atrial flutter;  Surgeon: Jose Antonio Gustafson MD;  [facility] C[facility]ASIVE [facility];  Service:     CAROTID ENDARTERECTOMY      CHOLECYSTECTOMY      CHOLECYSTECTOMY WITH INTRAOPERATIVE CHOLANGIOGRAM N/A 09/07/2019    Procedure: Laparoscopic cholecystectomy with intraoperative cholangiogram;  Surgeon: Gauri Travis MD;  Location: [facility] MAIN OR;  Service: General    COLONOSCOPY  01/06/2015    Diverticulosis, one TA    COLONOSCOPY N/A 02/14/2019    tics, NBIH, adenomatous polyp x 2    COLONOSCOPY N/A 11/05/2021    Procedure: COLONOSCOPY TO CECUM AND TERM. ILEUM WITH COLD POLYPECTOMIES;  Surgeon: Everton Abel MD;  Location: [facility] ENDOSCOPY;  Service: Gastroenterology;  Laterality: N/A;  PRE OP - PERS H/O POLYPS  POST OP - COLON POLYPS,, DIVERTICULOSIS, HEMORRHOIDS    CORONARY ARTERY BYPASS GRAFT N/A 04/11/2017    Procedure: AR STERNOTOMY CORONARY ARTERY BYPASS GRAFT TIMES 3 USING LEFT INTERNAL MAMMARY ARTERY AND LEFT GREATER SAPHENOUS VEIN GRAFT PER ENDOSCOPIC VEIN HARVESTING AND PRP ;  Surgeon: Temo Cortez MD;  Location: [facility] MAIN OR;  Service:     ENDOSCOPY  01/06/2015    HH, Mandel's esophagus    ENDOSCOPY N/A 02/14/2019    Z line irregular, HH, Mandel's esophagus    ENDOSCOPY N/A 11/05/2021    Procedure: ESOPHAGOGASTRODUODENOSCOPY WITH BIOPSIES;  " Surgeon: Everton Abel MD;  Location: [facility] ENDOSCOPY;  Service: Gastroenterology;  Laterality: N/A;  PRE OP - PERS H/O ERVIN'S  POST OP - IRREG Z LINE    KNEE SURGERY Left     URETEROSCOPY LASER LITHOTRIPSY WITH STENT INSERTION Left 2022    Procedure: Cysto retrograde with left uretro stent placement;  Surgeon: Damien Oliveira MD;  Location: [facility] MAIN OR;  Service: Urology;  Laterality: Left;    VASECTOMY       Social History     Socioeconomic History    Marital status:      Spouse name: Brooke    Years of education: 9th grade   Tobacco Use    Smoking status: Former     Packs/day: 1     Types: Cigarettes     Start date: 1959     Quit date: 2009     Years since quittin.8    Smokeless tobacco: Never    Tobacco comments:     CAFFEINE USE   Vaping Use    Vaping Use: Never used   Substance and Sexual Activity    Alcohol use: Not Currently     Comment: rarely    Drug use: No    Sexual activity: Defer     Partners: Female     Family History   Problem Relation Age of Onset    Hypertension Mother     Heart disease Mother     Heart attack Mother     Stroke Mother     Heart disease Father     Heart attack Father     Stroke Father     Hypertension Sister     Heart attack Brother     Heart disease Brother     No Known Problems Brother     Heart disease Brother     Heart attack Brother     Diabetes Brother     No Known Problems Maternal Grandmother     No Known Problems Maternal Grandfather     No Known Problems Paternal Grandmother     No Known Problems Paternal Grandfather     Pancreatic cancer Paternal Cousin     Arthritis Other     Diabetes Other     Hypertension Other     Kidney disease Other         stones    Malig Hyperthermia Neg Hx          Allergies:  Atorvastatin and Penicillins    Medications:  Prior to Admission medications    Medication Sig Start Date End Date Taking? Authorizing Provider   allopurinol (Zyloprim) 300 MG tablet Take 1 tablet by mouth Daily. 23  Yes Daniel  MD Damian   amitriptyline (ELAVIL) 50 MG tablet TAKE ONE TABLET BY MOUTH ONCE NIGHTLY 9/28/23  Yes Mukesh Russell II, MD   amLODIPine (NORVASC) 2.5 MG tablet Take 1 tablet by mouth Daily. 1/12/23  Yes Kate Ayala APRN   apixaban (ELIQUIS) 5 MG tablet tablet Take 1 tablet by mouth 2 (Two) Times a Day. 1/12/23  Yes Kate Ayala APRN   HYDROcodone-acetaminophen (NORCO)  MG per tablet Take 1 tablet by mouth Every 8 (Eight) Hours As Needed for Severe Pain. 30 day supply 10/12/23  Yes Marisol Perales APRN   hydroxyurea (HYDREA) 500 MG capsule Take 1 capsule by mouth Every Other Day. 9/22/23  Yes Aquiles Lopez MD   losartan (Cozaar) 50 MG tablet Take 1 tablet by mouth Daily. 10/4/23  Yes Damian Sanchez MD   Myrbetriq 25 MG tablet sustained-release 24 hour 24 hr tablet Take 1 tablet by mouth Daily. 10/5/20  Yes Gary Garcia MD   potassium chloride 10 MEQ CR tablet TAKE ONE TABLET BY MOUTH DAILY 4/4/23  Yes Damian Sanchez MD   RABEprazole (Aciphex) 20 MG EC tablet Take 1 tablet by mouth Daily. 10/4/23  Yes Damian Sanchez MD   rosuvastatin (Crestor) 20 MG tablet Take 1 tablet by mouth Daily. 7/11/23  Yes Damian Sanchez MD   furosemide (LASIX) 20 MG tablet TAKE ONE TABLET BY MOUTH DAILY 4/4/23   Damian Sanchez MD   HYDROcodone-acetaminophen (NORCO) 5-325 MG per tablet Take 2 tablets by mouth Every 8 (Eight) Hours As Needed for Severe Pain. 30 day supply. DNF 10/19/23 10/17/23   Marisol Perales APRN   Ibuprofen 3 %, Gabapentin 10 %, Baclofen 2 %, lidocaine 4 %, Ketamine HCl 4 % Apply 1-2 g topically to the appropriate area as directed 3 (Three) to 4 (Four) times daily. 10/12/23 10/11/24  Marisol Perales APRN   mupirocin (BACTROBAN) 2 % cream Apply 1 application topically to the appropriate area as directed 2 (Two) Times a Day. 7/18/22   Elmo Owens APRN     [Held by provider] allopurinol, 300 mg, Oral, Daily  amitriptyline, 50 mg, Oral, Nightly  amLODIPine, 2.5 mg, Oral,  "Daily  apixaban, 5 mg, Oral, BID  cefepime, 2,000 mg, Intravenous, Q8H  dexAMETHasone, 6 mg, Intravenous, Daily  furosemide, 20 mg, Nasogastric, Daily  oxybutynin XL, 5 mg, Oral, Daily  pantoprazole, 40 mg, Intravenous, Q AM             Objective     Vital Signs  Vital Sign Min/Max for last 24 hours  Temp  Min: 97.7 °F (36.5 °C)  Max: 98.7 °F (37.1 °C)   BP  Min: 151/80  Max: 171/97   Pulse  Min: 70  Max: 70   Resp  Min: 18  Max: 18   SpO2  Min: 96 %  Max: 97 %   Flow (L/min)  Min: 1  Max: 1   Weight  Min: 69.2 kg (152 lb 8.9 oz)  Max: 69.2 kg (152 lb 8.9 oz)       Intake/Output Summary (Last 24 hours) at 10/29/2023 1217  Last data filed at 10/28/2023 2200  Gross per 24 hour   Intake 60 ml   Output --   Net 60 ml     No intake/output data recorded.  Last Weight and Admission Weight        10/29/23  0309   Weight: 69.2 kg (152 lb 8.9 oz)     Flowsheet Rows      Flowsheet Row First Filed Value   Admission Height 172.7 cm (68\") Documented at 10/23/2023 0536   Admission Weight 78.8 kg (173 lb 12.8 oz) Documented at 10/23/2023 0536            Body mass index is 23.2 kg/m².           Physical Exam:    General Appearance: Elderly white male looks even older than his stated age does not appear in acute distress he sitting up in a chair on 1 L O2 with oxygen saturations of 95%  Eyes: Conjunctiva are clear and anicteric pupils are equal  ENT: Mucous membranes are dry he has a left nare nasoenteric tube  Neck: No palpable adenopathy or thyromegaly no visible jugular venous distention or hepatojugular reflux, trachea midline  Lungs: Chest is pretty clear today I do not hear any crackles wheezes or rales no dullness.  Cardiac: Regular rate and rhythm   Abdomen: Soft no palpable hepatosplenomegaly or masses  : Not examined  Musculoskeletal: No real change from yesterday  Skin: Warm and dry no jaundice no diaphoresis no petechiae  Neuro: Conversant and interactive today he is following commands briskly  Extremities/P Vascular: " No clubbing no cyanosis no edema palpable radial and dorsalis pedis pulses  MSE: He remained very interactive today    Labs:  Results from last 7 days   Lab Units 10/27/23  0539 10/26/23  0229 10/25/23  0446 10/24/23  0443 10/23/23  0548   GLUCOSE mg/dL 120* 136* 124* 84 138*   SODIUM mmol/L 142 138 130* 132* 136   POTASSIUM mmol/L 4.5 4.6 3.8 4.6 4.2   MAGNESIUM mg/dL 3.1* 2.0 1.7 2.0  --    CO2 mmol/L 21.0* 18.6* 18.3* 21.5* 23.0   CHLORIDE mmol/L 114* 108* 97* 99 102   ANION GAP mmol/L 7.0 11.4 14.7 11.5 11.0   CREATININE mg/dL 0.75* 0.92 1.05 0.97 1.00   BUN mg/dL 34* 40* 21 15 17   BUN / CREAT RATIO  45.3* 43.5* 20.0 15.5 17.0   CALCIUM mg/dL 8.2* 7.9* 8.4* 8.3* 9.0   ALK PHOS U/L 154* 217* 230* 204* 191*   TOTAL PROTEIN g/dL 5.2* 5.7* 6.3 6.3 6.6   ALT (SGPT) U/L 134* 210* 86* 55* 31   AST (SGOT) U/L 189* 391* 194* 97* 49*   BILIRUBIN mg/dL 0.5 0.6 1.2 0.8 0.7   ALBUMIN g/dL 2.7* 3.0* 3.5 3.6 3.9   GLOBULIN gm/dL 2.5 2.7 2.8 2.7 2.7     Estimated Creatinine Clearance: 79.5 mL/min (A) (by C-G formula based on SCr of 0.75 mg/dL (L)).      Results from last 7 days   Lab Units 10/27/23  0540 10/26/23  0229 10/25/23  0446 10/24/23  0443 10/23/23  0507   WBC 10*3/mm3 10.54 16.18* 3.99 4.18 8.40   RBC 10*6/mm3 2.79* 3.10* 3.47* 3.07* 3.41*   HEMOGLOBIN g/dL 10.6* 12.2* 13.2 11.9* 13.1   HEMATOCRIT % 31.5* 34.1* 38.0 34.6* 38.9   MCV fL 112.9* 110.0* 109.5* 112.7* 114.1*   MCH pg 38.0* 39.4* 38.0* 38.8* 38.4*   MCHC g/dL 33.7 35.8* 34.7 34.4 33.7   RDW % 12.7 12.6 12.5 12.6 12.9   RDW-SD fl 53.6 51.5 49.3 51.7 55.1*   MPV fL 10.9 10.8 10.9 10.9 10.3   PLATELETS 10*3/mm3 156 145 133* 159 208   NEUTROS ABS 10*3/mm3 9.06* 14.72* 3.67 2.98 7.47*   NRBC /100 WBC 0.1 0.1  --   --  0.0     Results from last 7 days   Lab Units 10/25/23  0412   PH, ARTERIAL pH units 7.523*   PO2 ART mm Hg 61.0*   PCO2, ARTERIAL mm Hg 26.0*   HCO3 ART mmol/L 21.4*     Results from last 7 days   Lab Units 10/23/23  0828 10/23/23  0548   CK TOTAL  U/L  --  158   HSTROP T ng/L 29* 25*             Results from last 7 days   Lab Units 10/27/23  0540 10/27/23  0004 10/26/23  1738 10/25/23  1456 10/25/23  1041 10/25/23  0732 10/25/23  0446   LACTATE mmol/L 1.2 2.4* 2.0 1.9 4.4* 4.5* 3.4*   PROCALCITONIN ng/mL  --   --   --   --   --   --  3.88*         Microbiology Results (last 10 days)       Procedure Component Value - Date/Time    MRSA Screen, PCR (Inpatient) - Swab, Nares [[id number]]  (Normal) Collected: 10/25/23 1115    Lab Status: Final result Specimen: Swab from Nares Updated: 10/25/23 1308     MRSA PCR No MRSA Detected    Narrative:      The negative predictive value of this diagnostic test is high and should only be used to consider de-escalating anti-MRSA therapy. A positive result may indicate colonization with MRSA and must be correlated clinically.    Blood Culture - Blood, Arm, Right [[id number]]  (Normal) Collected: 10/25/23 0457    Lab Status: Preliminary result Specimen: Blood from Arm, Right Updated: 10/29/23 0515     Blood Culture No growth at 4 days    Blood Culture - Blood, Arm, Left [[id number]]  (Normal) Collected: 10/25/23 0446    Lab Status: Preliminary result Specimen: Blood from Arm, Left Updated: 10/29/23 0515     Blood Culture No growth at 4 days    Respiratory Panel PCR w/COVID-19(SARS-CoV-2) DONTRELL/LESTER/KANCHAN/PAD/COR/MAD/BRIAN In-House, NP Swab in UT/VTM, 3-4 HR TAT - Swab, Nasopharynx [[id number]]  (Abnormal) Collected: 10/23/23 0650    Lab Status: Final result Specimen: Swab from Nasopharynx Updated: 10/23/23 0809     ADENOVIRUS, PCR Not Detected     Coronavirus 229E Not Detected     Coronavirus HKU1 Not Detected     Coronavirus NL63 Not Detected     Coronavirus OC43 Not Detected     COVID19 Detected     Human Metapneumovirus Not Detected     Human Rhinovirus/Enterovirus Not Detected     Influenza A PCR Not Detected     Influenza B PCR Not Detected     Parainfluenza Virus 1 Not Detected     Parainfluenza Virus 2 Not Detected      Parainfluenza Virus 3 Not Detected     Parainfluenza Virus 4 Not Detected     RSV, PCR Not Detected     Bordetella pertussis pcr Not Detected     Bordetella parapertussis PCR Not Detected     Chlamydophila pneumoniae PCR Not Detected     Mycoplasma pneumo by PCR Not Detected    Narrative:      In the setting of a positive respiratory panel with a viral infection PLUS a negative procalcitonin without other underlying concern for bacterial infection, consider observing off antibiotics or discontinuation of antibiotics and continue supportive care. If the respiratory panel is positive for atypical bacterial infection (Bordetella pertussis, Chlamydophila pneumoniae, or Mycoplasma pneumoniae), consider antibiotic de-escalation to target atypical bacterial infection.              Diagnostics:  XR Chest 1 View    Result Date: 10/25/2023  Portable chest x-ray  HISTORY: Increased oxygen need.  TECHNIQUE: Portable chest x-ray is provided and correlated with x-ray October 23, 2023. This image was acquired and an exaggerated reverse lordotic configuration which partly obscures visualization of the lung bases.  FINDINGS: Sternal wires with new asymmetric density in the left mid and lower lung zones. The visualized right lung appears clear. Vascular volume is normal.      New opacity in the left lung is asymmetric and favored represent pneumonia.  This report was finalized on 10/25/2023 8:13 AM by Dr. Jose Antonio Ortega M.D on Workstation: [id number]      CT Head Without Contrast    Result Date: 10/24/2023  EMERGENCY CT SCAN OF THE HEAD WITHOUT CONTRAST ON 10/23/2023  CLINICAL HISTORY: Patient fell, left-sided head trauma, headache, patient is on anticoagulation.  TECHNIQUE: Spiral CT images were obtained from the base of the skull to the vertex without intravenous contrast. The images were reformatted and submitted in 3 mm thick axial, sagittal and coronal CT sections with brain algorithm and 2 mm thick axial CT sections with  high-resolution bone algorithm.  This is correlated to prior head CT without contrast from [facility] on 02/19[facility]2023 and a prior MRI of the brain on 03/16/2015.  FINDINGS: There is some patchy low-density extending from the periventricular into the subcortical white matter of the cerebral hemispheres consistent with mild-to-moderate small vessel disease. There is a small 3-4 mm old lacunar infarct in the inferior lateral right thalamus. There are tiny old lacunar infarcts in the anterior and mid left corona radiata region. There is a focal area of encephalomalacia involving the posterior superior left frontal lobe measuring 14 x 10 mm in size compatible with an old posterior superior left frontal infarct in the left MCA territory and this is unchanged dating back to an MRI of the brain on 03/16/2015. The remainder of the brain parenchyma is normal in attenuation. The ventricles are normal in size. I see no focal mass effect. There is no midline shift. No extra axial fluid collections are identified. There is no evidence of acute intracranial hemorrhage. No acute skull fracture is identified. The calvarium and skull base are normal in appearance. There is a 3 mm calcific density in the subcutaneous fat of the scalp overlying the anterior inferior left frontal bone just above the left orbit, unchanged. The paranasal sinuses and the mastoid air cells and middle ear cavities are clear.      1. No acute intracranial abnormality is identified. 2. There is mild-to-moderate small vessel disease in the cerebral white matter. 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 3. The remainder of the head CT is within normal limits. Specifically, no acute skull fracture or intracranial hemorrhage is  identified.  Radiation dose reduction techniques were utilized, including automated exposure control and exposure modulation based on body size.   This report was finalized on 10/24/2023 8:07 AM by Dr. Christiano Leon M.D on Workstation: [id number]      XR Chest 1 View    Result Date: 10/23/2023  EXAM: XR CHEST 1 VW-  INDICATION: Weakness  COMPARISON: Radiographs dating back to 3/10/2015       No focal consolidation. No pleural effusion or pneumothorax.  Normal size cardiomediastinal silhouette. Nondisplaced median sternotomy wires.   This report was finalized on 10/23/2023 7:51 AM by Dr. Alexander Causey M.D on Workstation: [id number]      Results for orders placed during the hospital encounter of 04/10/21    Adult Transthoracic Echo Complete w/ Color, Spectral and Contrast if Necessary Per Protocol    Interpretation Summary  · Estimated left ventricular EF = 57% Left ventricular systolic function is normal.  · Left ventricular diastolic function was normal.  · The right ventricular cavity is mildly dilated.  · The right atrial cavity is mildly dilated.  · Mild mitral annular calcification is present. There is mild, bileaflet mitral valve thickening present. Trace mitral valve regurgitation is present. No significant mitral valve stenosis is present.  · No aortic valve regurgitation or stenosis is present. There is mild thickening of the aortic valve. The aortic valve appears trileaflet.          Assessment & Plan     COVID-19 infection is on remdesivir and dexamethasone with increased liver enzymes hold remdesivir continue Decadron  Pneumonia his initial x-ray was clear he has a left lower probably lobar infiltrate with elevated procalcitonin I think he has a secondary bacterial pneumonia on his COVID-19 infection.  MRSA screen was negative and vancomycin discontinued on cefepime cultures thus far negative this may all be an aspiration pneumonitis plan complete 5 days antibiotics  Acute hypoxic respiratory failure  secondary to #2 above is a former smoker had about a 15-pack-year history but quit a number of years ago and no known lung disease.  O2 requirements have improved significantly down to 1 L nasal cannula looks like there is plenty of room to wean further..  Hyponatremia resolved  Severe sepsis markedly improved  Lactic acidosis secondary to sepsis solved  Elevated liver function test possibly COVID hepatitis but no rising rapidly and may have been related to remdesivir or combination remdesivir and Crestor both of those were stopped.  Hypertension by history blood pressure is adequate holding his antihypertensives still  Polycythemia vera he is on Hydrea with his sepsis we probably should hold that at least temporarily.  Furthermore he is actually a little anemic  Coronary artery disease with prior coronary bypass grafting  Lethargy improved probably little metabolic encephalopathy related to his sepsis  Oral pharyngeal dysphagia pretty severe n.p.o. now with feeding tube his voice is also gotten progressively more hoarse this could be related to aspiration but I am wondering if there is more going on I wonder if he would benefit from ENT taking a look.  I will defer to Dr. Sumner.            Saman Starr Jr, MD  10/29/23  12:17 EDT    Time:

## 2023-10-29 NOTE — PLAN OF CARE
Problem: Skin Injury Risk Increased  Goal: Skin Health and Integrity  Outcome: Ongoing, Progressing  Intervention: Optimize Skin Protection  Recent Flowsheet Documentation  Taken 10/28/2023 2200 by Luisa Ozuna RN  Head of Bed (HOB) Positioning: HOB elevated  Taken 10/28/2023 2000 by Luisa Ozuna RN  Pressure Reduction Techniques:   weight shift assistance provided   rest period provided between sit times   frequent weight shift encouraged   heels elevated off bed  Head of Bed (HOB) Positioning: HOB elevated  Pressure Reduction Devices: pressure-redistributing mattress utilized  Skin Protection:   adhesive use limited   transparent dressing maintained   tubing/devices free from skin contact   incontinence pads utilized     Problem: Fall Injury Risk  Goal: Absence of Fall and Fall-Related Injury  Outcome: Ongoing, Progressing  Intervention: Identify and Manage Contributors  Recent Flowsheet Documentation  Taken 10/28/2023 2000 by Luisa Ozuna RN  Medication Review/Management: medications reviewed  Intervention: Promote Injury-Free Environment  Recent Flowsheet Documentation  Taken 10/29/2023 0000 by Luisa Ozuna RN  Safety Promotion/Fall Prevention:   safety round/check completed   nonskid shoes/slippers when out of bed   lighting adjusted   fall prevention program maintained   clutter free environment maintained   activity supervised  Taken 10/28/2023 2200 by Luisa Ozuna RN  Safety Promotion/Fall Prevention:   safety round/check completed   nonskid shoes/slippers when out of bed   lighting adjusted   fall prevention program maintained   clutter free environment maintained   activity supervised  Taken 10/28/2023 2000 by Luisa Ozuna RN  Safety Promotion/Fall Prevention:   safety round/check completed   nonskid shoes/slippers when out of bed   lighting adjusted   fall prevention program maintained   clutter free environment maintained   activity supervised     Problem: Adult Inpatient  Plan of Care  Goal: Plan of Care Review  Outcome: Ongoing, Progressing  Goal: Patient-Specific Goal (Individualized)  Outcome: Ongoing, Progressing  Goal: Absence of Hospital-Acquired Illness or Injury  Outcome: Ongoing, Progressing  Intervention: Identify and Manage Fall Risk  Recent Flowsheet Documentation  Taken 10/29/2023 0000 by Luisa Ozuna RN  Safety Promotion/Fall Prevention:   safety round/check completed   nonskid shoes/slippers when out of bed   lighting adjusted   fall prevention program maintained   clutter free environment maintained   activity supervised  Taken 10/28/2023 2200 by Luisa Ozuna RN  Safety Promotion/Fall Prevention:   safety round/check completed   nonskid shoes/slippers when out of bed   lighting adjusted   fall prevention program maintained   clutter free environment maintained   activity supervised  Taken 10/28/2023 2000 by Luisa Ozuna RN  Safety Promotion/Fall Prevention:   safety round/check completed   nonskid shoes/slippers when out of bed   lighting adjusted   fall prevention program maintained   clutter free environment maintained   activity supervised  Intervention: Prevent Skin Injury  Recent Flowsheet Documentation  Taken 10/28/2023 2200 by Luisa Ozuna RN  Body Position:   turned   weight shifting  Taken 10/28/2023 2000 by Luisa Ozuna RN  Body Position: weight shifting  Skin Protection:   adhesive use limited   transparent dressing maintained   tubing/devices free from skin contact   incontinence pads utilized  Intervention: Prevent and Manage VTE (Venous Thromboembolism) Risk  Recent Flowsheet Documentation  Taken 10/29/2023 0000 by Luisa Ozuna RN  Activity Management: activity encouraged  Taken 10/28/2023 2200 by Luisa Ozuna RN  Activity Management: activity encouraged  Taken 10/28/2023 2000 by Luisa Ozuna RN  Activity Management: activity encouraged  VTE Prevention/Management:   bilateral   sequential compression devices  on  Range of Motion: active ROM (range of motion) encouraged  Intervention: Prevent Infection  Recent Flowsheet Documentation  Taken 10/29/2023 0000 by Luisa Ozuna RN  Infection Prevention:   visitors restricted/screened   single patient room provided   rest/sleep promoted   personal protective equipment utilized   hand hygiene promoted  Taken 10/28/2023 2200 by Luisa Ozuna RN  Infection Prevention:   visitors restricted/screened   single patient room provided   rest/sleep promoted   personal protective equipment utilized   equipment surfaces disinfected  Taken 10/28/2023 2000 by Luisa Ozuna RN  Infection Prevention:   single patient room provided   visitors restricted/screened   rest/sleep promoted   personal protective equipment utilized   hand hygiene promoted  Goal: Optimal Comfort and Wellbeing  Outcome: Ongoing, Progressing  Intervention: Provide Person-Centered Care  Recent Flowsheet Documentation  Taken 10/28/2023 2000 by Luisa Ozuna RN  Trust Relationship/Rapport:   care explained   choices provided   emotional support provided   empathic listening provided   questions answered  Goal: Readiness for Transition of Care  Outcome: Ongoing, Progressing     Problem: Infection  Goal: Absence of Infection Signs and Symptoms  Outcome: Ongoing, Progressing  Intervention: Prevent or Manage Infection  Recent Flowsheet Documentation  Taken 10/29/2023 0000 by Luisa Ozuna RN  Isolation Precautions: precautions maintained  Taken 10/28/2023 2200 by Luisa Ozuna RN  Isolation Precautions: precautions maintained  Taken 10/28/2023 2000 by Luisa Ozuna RN  Isolation Precautions: precautions maintained     Problem: Hypertension Comorbidity  Goal: Blood Pressure in Desired Range  Outcome: Ongoing, Progressing  Intervention: Maintain Blood Pressure Management  Recent Flowsheet Documentation  Taken 10/28/2023 2000 by Luisa Ozuna RN  Medication Review/Management: medications reviewed      Problem: Adjustment to Illness (Sepsis/Septic Shock)  Goal: Optimal Coping  Outcome: Ongoing, Progressing  Intervention: Optimize Psychosocial Adjustment to Illness  Recent Flowsheet Documentation  Taken 10/28/2023 2000 by Luisa Ozuna RN  Family/Support System Care:   support provided   self-care encouraged     Problem: Bleeding (Sepsis/Septic Shock)  Goal: Absence of Bleeding  Outcome: Ongoing, Progressing     Problem: Glycemic Control Impaired (Sepsis/Septic Shock)  Goal: Blood Glucose Level Within Desired Range  Outcome: Ongoing, Progressing  Intervention: Optimize Glycemic Control  Recent Flowsheet Documentation  Taken 10/28/2023 2000 by Luisa Ozuna RN  Glycemic Management: blood glucose monitored     Problem: Infection Progression (Sepsis/Septic Shock)  Goal: Absence of Infection Signs and Symptoms  Outcome: Ongoing, Progressing  Intervention: Initiate Sepsis Management  Recent Flowsheet Documentation  Taken 10/29/2023 0000 by Luisa Ozuna RN  Infection Prevention:   visitors restricted/screened   single patient room provided   rest/sleep promoted   personal protective equipment utilized   hand hygiene promoted  Isolation Precautions: precautions maintained  Taken 10/28/2023 2200 by Luisa Ozuna RN  Infection Prevention:   visitors restricted/screened   single patient room provided   rest/sleep promoted   personal protective equipment utilized   equipment surfaces disinfected  Isolation Precautions: precautions maintained  Taken 10/28/2023 2000 by Luisa Ozuna RN  Infection Prevention:   single patient room provided   visitors restricted/screened   rest/sleep promoted   personal protective equipment utilized   hand hygiene promoted  Isolation Precautions: precautions maintained  Intervention: Promote Recovery  Recent Flowsheet Documentation  Taken 10/29/2023 0000 by Luisa Ozuna RN  Activity Management: activity encouraged  Taken 10/28/2023 2200 by Luisa Ozuna  RN  Activity Management: activity encouraged  Taken 10/28/2023 2000 by Luisa Ozuna RN  Activity Management: activity encouraged     Problem: Nutrition Impaired (Sepsis/Septic Shock)  Goal: Optimal Nutrition Intake  Outcome: Ongoing, Progressing     Problem: Aspiration (Enteral Nutrition)  Goal: Absence of Aspiration Signs and Symptoms  Outcome: Ongoing, Progressing  Intervention: Minimize Aspiration Risk  Recent Flowsheet Documentation  Taken 10/28/2023 2200 by Luisa Ozuna RN  Head of Bed (HOB) Positioning: HOB elevated  Taken 10/28/2023 2000 by Luisa Ozuna RN  Head of Bed (HOB) Positioning: HOB elevated     Problem: Device-Related Complication Risk (Enteral Nutrition)  Goal: Safe, Effective Therapy Delivery  Outcome: Ongoing, Progressing     Problem: Feeding Intolerance (Enteral Nutrition)  Goal: Feeding Tolerance  Outcome: Ongoing, Progressing

## 2023-10-29 NOTE — PROGRESS NOTES
"    DAILY PROGRESS NOTE  [facility]    Patient Identification:  Name: Madhu Santoro  Age: 77 y.o.  Sex: male  :  1945  MRN: [id number]         Primary Care Physician: Damian Sanchez MD    Subjective:  Interval History: Not really voicing anything new today.  He clinically does look better to me but he is still weak with his swallow and weak with his voice.  Spouse is at bedside and case discussed.  Case also discussed with RN at bedside and use of core track for medications.  Tolerating tube feeds.  Denies nausea vomiting or diarrhea    Objective: Elderly and quite frail but making some clinical improvement    Scheduled Meds:[Held by provider] allopurinol, 300 mg, Oral, Daily  amitriptyline, 50 mg, Oral, Nightly  amLODIPine, 2.5 mg, Oral, Daily  apixaban, 5 mg, Oral, BID  cefepime, 2,000 mg, Intravenous, Q8H  dexAMETHasone, 6 mg, Intravenous, Daily  oxybutynin XL, 5 mg, Oral, Daily  pantoprazole, 40 mg, Intravenous, Q AM      Continuous Infusions:     Vital signs in last 24 hours:  Temp:  [97.7 °F (36.5 °C)-98.7 °F (37.1 °C)] 98.7 °F (37.1 °C)  Heart Rate:  [70] 70  Resp:  [18] 18  BP: (151-171)/(80-97) 171/97    Intake/Output:    Intake/Output Summary (Last 24 hours) at 10/29/2023 1026  Last data filed at 10/28/2023 2200  Gross per 24 hour   Intake 60 ml   Output --   Net 60 ml       Exam:  /97 (BP Location: Left arm, Patient Position: Lying)   Pulse 70   Temp 98.7 °F (37.1 °C) (Oral)   Resp 18   Ht 172.7 cm (68\")   Wt 69.2 kg (152 lb 8.9 oz)   SpO2 97%   BMI 23.20 kg/m²     General Appearance:    Alert, cooperative, sickly/frail/oriented                         Throat:   Very poor voice in oropharynx strength.  Core track                           Neck:   No JVD                         Lungs:    Diminished/rhonchi to auscultation bilaterally, respirations unlabored                 Chest Wall:    No tenderness or deformity                          Heart:    Regular rate and " rhythm, S1 and S2 normal                  Abdomen:     Soft, nontender, bowel sounds active                 Extremities: Generalized weakness, no cyanosis or edema                        Pulses:   Pulses palpable in all extremities                            Data Review:  Labs in chart were reviewed.    Assessment:  Active Hospital Problems    Diagnosis  POA    **COVID-19 [U07.1]  Yes    Polycythemia [D75.1]  Yes    Chronic anticoagulation [Z79.01]  Not Applicable    PAD (peripheral artery disease) [I73.9]  Yes    Stenosis of carotid artery [I65.29]  Yes    S/P ablation of atrial flutter [Z98.890, Z86.79]  Not Applicable    S/P CABG (coronary artery bypass graft) [Z95.1]  Not Applicable    Coronary artery disease due to lipid rich plaque [I25.10, I25.83]  Yes    HLD (hyperlipidemia) [E78.5]  Yes    Benign essential hypertension [I10]  Yes    Cerebrovascular accident [I63.9]  Yes      Resolved Hospital Problems   No resolved problems to display.       Plan:    COVID-pneumonia with secondary bacterial component and resolving sepsis              -Decadron D5              -Cefepime D5              -Elevated LFTs due to infection/reactive -remdesivir discontinued -LFTs trending down              -O2 requirements continue to improve -NC at 3 L-1L              -Resume Lasix 10/29/2023 -will also help with labile BP        Dysphagia with normal diet prior to admission              -N.p.o. as per speech so core track ordered with tube feeds per nutrition, tolerated per patient thus far with hopes that early upcoming week, further clinical improvement is noted and we can perhaps advance diet p.o.        A-fib with past history of ablation, CAD/CABG, HTN              -RC-AC with Eliquis              -HTN stable      Past history left MCA CVA/HLD     Past history of chronic pain on [place] and Elavil     Replacement of low phosphorus per tube feeds        Disposition -tube feeds over the weekend -hopeful this will be short-term  and perhaps can utilize oral in very near future as he is making clinical improvements daily.  If by midweek he is still not safe for p.o. safety then I think we need to reconsider surgical consultation for PEG tube placement at that time is getting this gentleman to rehab sometime this upcoming workweek would be paramount              -[facility] coordinating needs       Everardo Sumner MD  10/29/2023  10:26 EDT

## 2023-10-30 LAB
BACTERIA SPEC AEROBE CULT: NORMAL
BACTERIA SPEC AEROBE CULT: NORMAL
GLUCOSE BLDC GLUCOMTR-MCNC: 132 MG/DL (ref 70–130)
GLUCOSE BLDC GLUCOMTR-MCNC: 177 MG/DL (ref 70–130)
GLUCOSE BLDC GLUCOMTR-MCNC: 219 MG/DL (ref 70–130)
GLUCOSE BLDC GLUCOMTR-MCNC: 236 MG/DL (ref 70–130)
MAGNESIUM SERPL-MCNC: 2.2 MG/DL (ref 1.6–2.4)
PHOSPHATE SERPL-MCNC: 2.3 MG/DL (ref 2.5–4.5)
POTASSIUM SERPL-SCNC: 4.1 MMOL/L (ref 3.5–5.2)
QT INTERVAL: 379 MS
QTC INTERVAL: 462 MS

## 2023-10-30 PROCEDURE — 83735 ASSAY OF MAGNESIUM: CPT | Performed by: NURSE PRACTITIONER

## 2023-10-30 PROCEDURE — 97530 THERAPEUTIC ACTIVITIES: CPT

## 2023-10-30 PROCEDURE — 25010000002 DEXAMETHASONE PER 1 MG: Performed by: INTERNAL MEDICINE

## 2023-10-30 PROCEDURE — 84100 ASSAY OF PHOSPHORUS: CPT | Performed by: HOSPITALIST

## 2023-10-30 PROCEDURE — 82948 REAGENT STRIP/BLOOD GLUCOSE: CPT

## 2023-10-30 PROCEDURE — 25010000002 CEFEPIME PER 500 MG: Performed by: INTERNAL MEDICINE

## 2023-10-30 PROCEDURE — 92526 ORAL FUNCTION THERAPY: CPT

## 2023-10-30 PROCEDURE — 84132 ASSAY OF SERUM POTASSIUM: CPT | Performed by: NURSE PRACTITIONER

## 2023-10-30 RX ADMIN — CEFEPIME 2000 MG: 2 INJECTION, POWDER, FOR SOLUTION INTRAVENOUS at 10:12

## 2023-10-30 RX ADMIN — AMLODIPINE BESYLATE 2.5 MG: 5 TABLET ORAL at 10:14

## 2023-10-30 RX ADMIN — NYSTATIN 500000 UNITS: 100000 SUSPENSION ORAL at 10:13

## 2023-10-30 RX ADMIN — NYSTATIN 500000 UNITS: 100000 SUSPENSION ORAL at 00:57

## 2023-10-30 RX ADMIN — APIXABAN 5 MG: 5 TABLET, FILM COATED ORAL at 10:13

## 2023-10-30 RX ADMIN — PANTOPRAZOLE SODIUM 40 MG: 40 INJECTION, POWDER, FOR SOLUTION INTRAVENOUS at 06:53

## 2023-10-30 RX ADMIN — AMITRIPTYLINE HYDROCHLORIDE 50 MG: 50 TABLET, FILM COATED ORAL at 20:53

## 2023-10-30 RX ADMIN — APIXABAN 5 MG: 5 TABLET, FILM COATED ORAL at 20:53

## 2023-10-30 RX ADMIN — HYDROCODONE BITARTRATE AND HOMATROPINE METHYLBROMIDE ORAL SOLUTION 5 ML: 5; 1.5 LIQUID ORAL at 11:49

## 2023-10-30 RX ADMIN — DEXAMETHASONE SODIUM PHOSPHATE 6 MG: 10 INJECTION INTRAMUSCULAR; INTRAVENOUS at 10:15

## 2023-10-30 RX ADMIN — CEFEPIME 2000 MG: 2 INJECTION, POWDER, FOR SOLUTION INTRAVENOUS at 01:29

## 2023-10-30 RX ADMIN — FUROSEMIDE 20 MG: 20 TABLET ORAL at 10:14

## 2023-10-30 NOTE — THERAPY RE-EVALUATION
Acute Care - Speech Language Pathology   Swallow Re-Evaluation [facility]     Patient Name: Madhu Santoro  : 1945  MRN: [id number]  Today's Date: 10/30/2023               Admit Date: 10/23/2023    Visit Dx:     ICD-10-CM ICD-9-CM   1. COVID-19  U07.1 079.89   2. Generalized weakness  R53.1 780.79   3. Fall at home, initial encounter  W19.XXXA E888.9    Y92.009 E849.0   4. Abrasion of left ear, initial encounter  S00.412A 910.0   5. Elevated troponin  R79.89 790.6     Patient Active Problem List   Diagnosis    Anxiety    Arthritis    Coronary artery disease due to lipid rich plaque    HLD (hyperlipidemia)    Benign essential hypertension    DDD (degenerative disc disease), lumbosacral    Cerebrovascular accident    Encounter for screening for cardiovascular disorders    Gastroesophageal reflux disease    Hyperlipidemia    Stenosis of carotid artery    Former smoker    History of cardiac catheterization    S/P ablation of atrial flutter    Typical atrial flutter    S/P CABG (coronary artery bypass graft)    Adenomatous polyp of ascending colon    Abdominal bloating    Stroke    PAD (peripheral artery disease)    Acute cholecystitis    Right upper quadrant abdominal pain    Chronic pain of both hips    Chronic bilateral low back pain without sciatica    Sacroiliac joint dysfunction of both sides    Encounter for long-term (current) use of high-risk medication    Lumbar facet arthropathy    Stroke-like symptoms    CVA (cerebral vascular accident)    Personal history of colonic polyps    Arthralgia of multiple joints    Iron deficiency    Thrombocytosis    Left ureteral stone    Obstructive uropathy    Chronic anticoagulation    Facial skin lesion    Iron deficiency anemia    Polycythemia    Neuropathy    Weakness    Pneumonia    Close exposure to COVID-19 virus    Polycythemia vera    Chronic gout without tophus    COVID-19     Past Medical History:   Diagnosis Date    Anxiety     Arthritis     Atrial  "flutter     Status post cavotricuspid isthmus ablation by Dr. Gustafson on 4/18/17    Mandel esophagus     Benign essential hypertension     CAD (coronary artery disease)     3 vessel CABG 4/11/17 by Dr. Cortez: ROSARIO-prox LAD, SVG-OM1, SVG-OM3    Carotid artery disease     Status post carotid endarterectomy - USG 4/10/17: 50-59% NICHELLE, 1-15% LICA.     Colonic polyp     Cyst of pancreas     DDD (degenerative disc disease), lumbosacral     GERD (gastroesophageal reflux disease)     H/O bone density study 2013    H/O complete eye exam 2014    History of kidney stone     HLD (hyperlipidemia)     Hypertension     Kidney stone     8/22/22    Lipid screening 05/31/2013    Low back pain     physical therapy [facility] 5-12-10    Screening for prostate cancer 07/07/2015    Skin cancer     nose    Stroke     RESIDUAL--\"BALANCE ISSUES\"     Past Surgical History:   Procedure Laterality Date    CARDIAC CATHETERIZATION N/A 04/10/2017    Procedure: Left Heart Cath;  Surgeon: Marjorie Healy MD;  Location[facility] C[facility]AS[facility] LOC[facility];  Service:     CARDIAC CATHETERIZATION N/A 04/10/2017    Procedure: Coronary angiography;  Surgeon: Marjorie Healy MD;  [facility];  Service:     CARDIAC CATHETERIZATION N/A 04/10/2017    Procedure: Left ventriculography;  Surgeon: Marjorie Healy MD;  [facility];  Service:     CARDIAC CATHETERIZATION  2011    CARDIAC ELECTROPHYSIOLOGY PROCEDURE N/A 04/18/2017    Procedure: Ablation atrial flutter;  Surgeon: Jose Antonio Gustafson MD;  Location[facility] C[facility];  Service:     CAROTID ENDARTERECTOMY      CHOLECYSTECTOMY      CHOLECYSTECTOMY WITH INTRAOPERATIVE CHOLANGIOGRAM N/A 09/07/2019    Procedure: Laparoscopic cholecystectomy with intraoperative cholangiogram;  Surgeon: Gauri Travis MD;  Location: [facility] MAIN OR;  Service: General    COLONOSCOPY  01/06/2015    Diverticulosis, one TA    COLONOSCOPY N/A 02/14/2019    tics, " NBIH, adenomatous polyp x 2    COLONOSCOPY N/A 11/05/2021    Procedure: COLONOSCOPY TO CECUM AND TERM. ILEUM WITH COLD POLYPECTOMIES;  Surgeon: Everton Abel MD;  Location:  [facility];  Service: Gastroenterology;  Laterality: N/A;  PRE OP - PERS H/O POLYPS  POST OP - COLON POLYPS,, DIVERTICULOSIS, HEMORRHOIDS    CORONARY ARTERY BYPASS GRAFT N/A 04/11/2017    Procedure: AR STERNOTOMY CORONARY ARTERY BYPASS GRAFT TIMES 3 USING LEFT INTERNAL MAMMARY ARTERY AND LEFT GREATER SAPHENOUS VEIN GRAFT PER ENDOSCOPIC VEIN HARVESTING AND PRP ;  Surgeon: Temo Cortez MD;  Location: [facility] MAIN OR;  Service:     ENDOSCOPY  01/06/2015    HH, Ervin's esophagus    ENDOSCOPY N/A 02/14/2019    Z line irregular, HH, Ervin's esophagus    ENDOSCOPY N/A 11/05/2021    Procedure: ESOPHAGOGASTRODUODENOSCOPY WITH BIOPSIES;  Surgeon: Everton Abel MD;  Location:  [facility];  Service: Gastroenterology;  Laterality: N/A;  PRE OP - PERS H/O ERVIN'S  POST OP - IRREG Z LINE    KNEE SURGERY Left     URETEROSCOPY LASER LITHOTRIPSY WITH STENT INSERTION Left 8/23/2022    Procedure: Cysto retrograde with left uretro stent placement;  Surgeon: Damien Oliveira MD;  Location:  [facility];  Service: Urology;  Laterality: Left;    VASECTOMY         SLP Recommendation and Plan  SLP Swallowing Diagnosis: suspected pharyngeal dysphagia (10/30/23 1000)  SLP Diet Recommendation: NPO (10/30/23 1000)     SLP Rec. for Method of Medication Administration: meds via alternate route (10/30/23 1000)     Monitor for Signs of Aspiration: notify SLP if any concerns (10/30/23 1000)  Recommended Diagnostics: reassess via clinical swallow evaluation (monitor readiness for VFSS) (10/30/23 1000)  Swallow Criteria for Skilled Therapeutic Interventions Met: demonstrates skilled criteria (10/30/23 1000)     Rehab Potential/Prognosis, Swallowing: re-evaluate goals as necessary (10/30/23 1000)  Therapy Frequency (Swallow): PRN (10/30/23  1000)  Predicted Duration Therapy Intervention (Days): until discharge (10/30/23 1000)  Oral Care Recommendations: Oral Care BID/PRN (10/30/23 1000)                                      Oral Care Recommendations: Oral Care BID/PRN (10/30/23 1000)    Outcome Evaluation: Re-evaluation of swallow completed. Improvements in secretion management noted this date. Voice continues to be weak. Weak cough following ice chip, thin by spoon, nectar thick liquid by spoon, honey thick liquid by spoon/cup, and puree trials. Recommend continue NPO. Meds via alternate route. Ice chips for comfort with known aspiration risk. Speech to follow at bedside for re-eval and monitor readiness for VFSS.      SWALLOW EVALUATION (last 72 hours)       SLP Adult Swallow Evaluation       Row Name 10/30/23 1000                   Rehab Evaluation    Document Type re-evaluation  -CR        Subjective Information no complaints  -CR        Patient Observations alert;cooperative  -CR        Patient Effort good  -CR        Symptoms Noted During/After Treatment none  -CR           General Information    Patient Profile Reviewed yes  -CR           Pain Scale: Numbers Pre/Post-Treatment    Pretreatment Pain Rating 0/10 - no pain  -CR        Posttreatment Pain Rating 0/10 - no pain  -CR           SLP Evaluation Clinical Impression    SLP Swallowing Diagnosis suspected pharyngeal dysphagia  -CR        Functional Impact risk of aspiration/pneumonia  -CR        Rehab Potential/Prognosis, Swallowing re-evaluate goals as necessary  -CR        Swallow Criteria for Skilled Therapeutic Interventions Met demonstrates skilled criteria  -CR           Recommendations    Therapy Frequency (Swallow) PRN  -CR        Predicted Duration Therapy Intervention (Days) until discharge  -CR        SLP Diet Recommendation NPO  -CR        Recommended Diagnostics reassess via clinical swallow evaluation  monitor readiness for VFSS  -CR        Oral Care Recommendations Oral Care  BID/PRN  -CR        SLP Rec. for Method of Medication Administration meds via alternate route  -CR        Monitor for Signs of Aspiration notify SLP if any concerns  -CR           (LTG) Patient will demonstrate functional swallow for    Diet Texture (Demonstrate functional swallow) soft to chew (chopped) textures  -CR        Liquid viscosity (Demonstrate functional swallow) thin liquids  -CR        [place] (Demonstrate functional swallow) independently (over 90% accuracy)  -CR        Time Frame (Demonstrate functional swallow) by discharge  -CR        Comment (Demonstrate functional swallow) Re-evaluation of swallow completed. Improvements in secretion management noted this date. Voice continues to be weak. Weak cough following ice chip, thin by spoon, nectar thick liquid by spoon, honey thick liquid by spoon/cup, and puree trials. Recommend continue NPO. Meds via alternate route. Ice chips for comfort with known aspiration risk. Speech to follow at bedside for re-eval and monitor readiness for VFSS.  -CR                  User Key  (r) = Recorded By, (t) = Taken By, (c) = Cosigned By      Initials Name Effective Dates    CR Adry Moise, SLP 08/28/23 -                     EDUCATION  The patient has been educated in the following areas:   Dysphagia (Swallowing Impairment).        SLP GOALS       Row Name 10/30/23 1000             (LTG) Patient will demonstrate functional swallow for    Diet Texture (Demonstrate functional swallow) soft to chew (chopped) textures  -CR      Liquid viscosity (Demonstrate functional swallow) thin liquids  -CR      [place] (Demonstrate functional swallow) independently (over 90% accuracy)  -CR      Time Frame (Demonstrate functional swallow) by discharge  -CR      Comment (Demonstrate functional swallow) Re-evaluation of swallow completed. Improvements in secretion management noted this date. Voice continues to be weak. Weak cough following ice chip, thin by spoon, nectar  thick liquid by spoon, honey thick liquid by spoon/cup, and puree trials. Recommend continue NPO. Meds via alternate route. Speech to follow at bedside for re-eval and monitor readiness for VFSS.  -CR                User Key  (r) = Recorded By, (t) = Taken By, (c) = Cosigned By      Initials Name Provider Type    Adry Lentz SLP Speech and Language Pathologist                       Time Calculation:    Time Calculation- SLP       Row Name 10/30/23 1031             Time Calculation- SLP    SLP Start Time 0745  -CR      SLP Received On 10/30/23  -CR         Untimed Charges    70077-SE Treatment Swallow Minutes 45  -CR         Total Minutes    Untimed Charges Total Minutes 45  -CR       Total Minutes 45  -CR                User Key  (r) = Recorded By, (t) = Taken By, (c) = Cosigned By      Initials Name Provider Type    Adry Lentz SLP Speech and Language Pathologist                    Therapy Charges for Today       Code Description Service Date Service Provider Modifiers Qty    [id number] HC ST TREATMENT SWALLOW 3 10/30/2023 Adry Moise SLP GN 1                 GONZÁLEZ Camilo  10/30/2023

## 2023-10-30 NOTE — THERAPY TREATMENT NOTE
Patient Name: Madhu Santoro  : 1945    MRN: [id number]                              Today's Date: 10/30/2023       Admit Date: 10/23/2023    Visit Dx:     ICD-10-CM ICD-9-CM   1. COVID-19  U07.1 079.89   2. Generalized weakness  R53.1 780.79   3. Fall at home, initial encounter  W19.XXXA E888.9    Y92.009 E849.0   4. Abrasion of left ear, initial encounter  S00.412A 910.0   5. Elevated troponin  R79.89 790.6     Patient Active Problem List   Diagnosis    Anxiety    Arthritis    Coronary artery disease due to lipid rich plaque    HLD (hyperlipidemia)    Benign essential hypertension    DDD (degenerative disc disease), lumbosacral    Cerebrovascular accident    Encounter for screening for cardiovascular disorders    Gastroesophageal reflux disease    Hyperlipidemia    Stenosis of carotid artery    Former smoker    History of cardiac catheterization    S/P ablation of atrial flutter    Typical atrial flutter    S/P CABG (coronary artery bypass graft)    Adenomatous polyp of ascending colon    Abdominal bloating    Stroke    PAD (peripheral artery disease)    Acute cholecystitis    Right upper quadrant abdominal pain    Chronic pain of both hips    Chronic bilateral low back pain without sciatica    Sacroiliac joint dysfunction of both sides    Encounter for long-term (current) use of high-risk medication    Lumbar facet arthropathy    Stroke-like symptoms    CVA (cerebral vascular accident)    Personal history of colonic polyps    Arthralgia of multiple joints    Iron deficiency    Thrombocytosis    Left ureteral stone    Obstructive uropathy    Chronic anticoagulation    Facial skin lesion    Iron deficiency anemia    Polycythemia    Neuropathy    Weakness    Pneumonia    Close exposure to COVID-19 virus    Polycythemia vera    Chronic gout without tophus    COVID-19     Past Medical History:   Diagnosis Date    Anxiety     Arthritis     Atrial flutter     Status post cavotricuspid isthmus ablation by   "[facility] on 4/18/17    Mandel esophagus     Benign essential hypertension     CAD (coronary artery disease)     3 vessel CABG 4/11/17 by Dr. Cortez: ROSARIO-prox LAD, SVG-OM1, SVG-OM3    Carotid artery disease     Status post carotid endarterectomy - USG 4/10/17: 50-59% NICHELLE, 1-15% LICA.     Colonic polyp     Cyst of pancreas     DDD (degenerative disc disease), lumbosacral     GERD (gastroesophageal reflux disease)     H/O bone density study 2013    H/O complete eye exam 2014    History of kidney stone     HLD (hyperlipidemia)     Hypertension     Kidney stone     8/22/22    Lipid screening 05/31/2013    Low back pain     physical therapy [facility] 5-12-10    Screening for prostate cancer 07/07/2015    Skin cancer     nose    Stroke     RESIDUAL--\"BALANCE ISSUES\"     Past Surgical History:   Procedure Laterality Date    CARDIAC CATHETERIZATION N/A 04/10/2017    Procedure: Left Heart Cath;  Surgeon: Marjorie Healy MD;  Location:  [facility] C[facility] INVASIVE [facility];  Service:     CARDIAC CATHETERIZATION N/A 04/10/2017    Procedure: Coronary angiography;  Surgeon: Marjorie Healy MD;  Location:  [facility];  Service:     CARDIAC CATHETERIZATION N/A 04/10/2017    Procedure: Left ventriculography;  Surgeon: Marjorie Healy MD;  [facility];  Service:     CARDIAC CATHETERIZATION  2011    CARDIAC ELECTROPHYSIOLOGY PROCEDURE N/A 04/18/2017    Procedure: Ablation atrial flutter;  Surgeon: Jose Antonio Gustafson MD;  Location:  [facility]ATH [facility]VASIVE [facility]ATION;  Service:     CAROTID ENDARTERECTOMY      CHOLECYSTECTOMY      CHOLECYSTECTOMY WITH INTRAOPERATIVE CHOLANGIOGRAM N/A 09/07/2019    Procedure: Laparoscopic cholecystectomy with intraoperative cholangiogram;  Surgeon: Gauri Travis MD;  Location: [facility] MAIN OR;  Service: General    COLONOSCOPY  01/06/2015    Diverticulosis, one TA    COLONOSCOPY N/A 02/14/2019    tics, NBIH, adenomatous polyp x 2    COLONOSCOPY N/A 11/05/2021    " Procedure: COLONOSCOPY TO CECUM AND TERM. ILEUM WITH COLD POLYPECTOMIES;  Surgeon: Everton Abel MD;  Location: [facility] ENDOSCOPY;  Service: Gastroenterology;  Laterality: N/A;  PRE OP - PERS H/O POLYPS  POST OP - COLON POLYPS,, DIVERTICULOSIS, HEMORRHOIDS    CORONARY ARTERY BYPASS GRAFT N/A 04/11/2017    Procedure: AR STERNOTOMY CORONARY ARTERY BYPASS GRAFT TIMES 3 USING LEFT INTERNAL MAMMARY ARTERY AND LEFT GREATER SAPHENOUS VEIN GRAFT PER ENDOSCOPIC VEIN HARVESTING AND PRP ;  Surgeon: Temo Cortez MD;  Location: [facility] MAIN OR;  Service:     ENDOSCOPY  01/06/2015    HH, Ervin's esophagus    ENDOSCOPY N/A 02/14/2019    Z line irregular, HH, Ervin's esophagus    ENDOSCOPY N/A 11/05/2021    Procedure: ESOPHAGOGASTRODUODENOSCOPY WITH BIOPSIES;  Surgeon: Everton Abel MD;  Location: [facility] ENDOSCOPY;  Service: Gastroenterology;  Laterality: N/A;  PRE OP - PERS H/O ERVIN'S  POST OP - IRREG Z LINE    KNEE SURGERY Left     URETEROSCOPY LASER LITHOTRIPSY WITH STENT INSERTION Left 8/23/2022    Procedure: Cysto retrograde with left uretro stent placement;  Surgeon: Damien Oliveira MD;  Location: [facility] MAIN OR;  Service: Urology;  Laterality: Left;    VASECTOMY        General Information       Row Name 10/30/23 1128          Physical Therapy Time and Intention    Document Type therapy note (daily note)  -     Mode of Treatment individual therapy;physical therapy  -       Row Name 10/30/23 1128          General Information    Patient Profile Reviewed yes  -SM     Existing Precautions/Restrictions fall;NPO  NG tube  -       Row Name 10/30/23 1128          Cognition    Orientation Status (Cognition) oriented x 3  speaks very softly. Difficult to understand  -       Row Name 10/30/23 1128          Safety Issues, Functional Mobility    Impairments Affecting Function (Mobility) balance;coordination;endurance/activity tolerance;strength;shortness of breath  -               User Key  (r) = Recorded  By, (t) = Taken By, (c) = Cosigned By      Initials Name Provider Type    Larisa Barry PT Physical Therapist                   Mobility       Row Name 10/30/23 1130          Bed Mobility    Bed Mobility supine-sit;sit-supine  -SM     Supine-Sit [place] (Bed Mobility) minimum assist (75% patient effort);moderate assist (50% patient effort);verbal cues  -SM     Sit-Supine [place] (Bed Mobility) moderate assist (50% patient effort);maximum assist (25% patient effort);verbal cues  -SM     Assistive Device (Bed Mobility) bed rails;head of bed elevated  -SM     Comment, (Bed Mobility) Patient able to scoot a few times in sitting towards HOB  -SM               User Key  (r) = Recorded By, (t) = Taken By, (c) = Cosigned By      Initials Name Provider Type    Larisa Barry PT Physical Therapist                   Obj/Interventions       Row Name 10/30/23 1131          Balance    Balance Assessment sitting static balance;sitting dynamic balance  -SM     Static Sitting Balance moderate assist  -SM     Dynamic Sitting Balance moderate assist  -SM     Position, Sitting Balance sitting edge of bed  -     Comment, Balance Patient leans R when sitting at EOB. Patient worked on shifting weight and leaning down onto L elbow and returning trunk to midline with CGA-Toribio. Improved sitting balance noted following.  -SM               User Key  (r) = Recorded By, (t) = Taken By, (c) = Cosigned By      Initials Name Provider Type    Larisa Barry PT Physical Therapist                   Goals/Plan    No documentation.                  Clinical Impression       Row Name 10/30/23 1133          Pain    Pain Location - buttock  -     Pre/Posttreatment Pain Comment Patient did not rate pain  -SM       Row Name 10/30/23 1133          Plan of Care Review    Plan of Care Reviewed With patient  -     Outcome Evaluation Patient seen for PT session this AM. Patient supine in bed upon arrival. Patient appears  alert and able to follow commands. Patient speaks very quietly. Difficult to understand at times. Patient sat up to EOB with modA-maxA. Patient leans R when sitting at EOB requiring modA to correct. Patient worked on shifting weight and leaning down onto L elbow and returning trunk to midline with CGA-Toribio. Improved sitting balance noted following. Patient able to scoot a few times while seated towards HOB before required modA-maxA to return to supine and scoot up in bed. Patient very fatigued limiting further mobility this date. Encouraged patient to continue performed B LE exercises in bed throughout the day. Patient would continue to benefit from skilled PT intervention to address deficits in functional mobility. PT will continue to monitor.  -       Row Name 10/30/23 1133          Vital Signs    Pre Patient Position Supine  -     Intra Patient Position Sitting  -SM     Post Patient Position Supine  -SM       Row Name 10/30/23 1133          Positioning and Restraints    Pre-Treatment Position in bed  -SM     Post Treatment Position bed  -SM     In Bed notified nsg;call light within reach;encouraged to call for assist;exit alarm on;fowlers  -               User Key  (r) = Recorded By, (t) = Taken By, (c) = Cosigned By      Initials Name Provider Type     Larisa Brito, SHANELL Physical Therapist                   Outcome Measures       Row Name 10/30/23 1139 10/30/23 1000       How much help from another person do you currently need...    Turning from your back to your side while in flat bed without using bedrails? 2  -SM 2  -KP    Moving from lying on back to sitting on the side of a flat bed without bedrails? 2  -SM 2  -KP    Moving to and from a bed to a chair (including a wheelchair)? 1  -SM 2  -KP    Standing up from a chair using your arms (e.g., wheelchair, bedside chair)? 1  -SM 1  -KP    Climbing 3-5 steps with a railing? 1  -SM 1  -KP    To walk in hospital room? 1  -SM 1  -KP    AM-PAC 6 Clicks  Score (PT) 8  -SM 9  -KP    Highest level of mobility 3 --> Sat at edge of bed  -SM 3 --> Sat at edge of bed  -KP      Row Name 10/30/23 0435          How much help from another person do you currently need...    Turning from your back to your side while in flat bed without using bedrails? 2  -EK     Moving from lying on back to sitting on the side of a flat bed without bedrails? 2  -EK     Moving to and from a bed to a chair (including a wheelchair)? 1  -EK     Standing up from a chair using your arms (e.g., wheelchair, bedside chair)? 1  -EK     Climbing 3-5 steps with a railing? 1  -EK     To walk in hospital room? 1  -EK     AM-PAC 6 Clicks Score (PT) 8  -EK     Highest level of mobility 3 --> Sat at edge of bed  -EK       Row Name 10/30/23 1139          Functional Assessment    Outcome Measure Options AM-PAC 6 Clicks Basic Mobility (PT)  -               User Key  (r) = Recorded By, (t) = Taken By, (c) = Cosigned By      Initials Name Provider Type    Angie Crane, RN Registered Nurse    Tawana Toscano, RN Registered Nurse    Larisa Barry, SHANELL Physical Therapist                                 Physical Therapy Education       Title: PT OT SLP Therapies (Done)       Topic: Physical Therapy (Done)       Point: Mobility training (Done)       Learning Progress Summary             Patient Acceptance, E, VU by  at 10/30/2023 1139    Acceptance, E,D, DU by PC at 10/26/2023 1649    Acceptance, E,TB,D, VU,NR by  at 10/24/2023 1535                         Point: Home exercise program (Done)       Learning Progress Summary             Patient Acceptance, E, VU by  at 10/30/2023 1139    Acceptance, E,D, DU by PC at 10/26/2023 1649    Acceptance, E,TB,D, VU,NR by  at 10/24/2023 1535                         Point: Body mechanics (Done)       Learning Progress Summary             Patient Acceptance, E, VU by  at 10/30/2023 1139    Acceptance, E,D, DU by PC at 10/26/2023 1649    Acceptance, E,TB,D,  VU,NR by  at 10/24/2023 1535                         Point: Precautions (Done)       Learning Progress Summary             Patient Acceptance, E, VU by  at 10/30/2023 1139    Acceptance, E,D, DU by  at 10/26/2023 1649    Acceptance, E,TB,D, VU,NR by  at 10/24/2023 1535                                         User Key       Initials Effective Dates Name Provider Type Discipline     06/16/21 -  Blanca Elkins, PT Physical Therapist PT     06/16/21 -  Naomi Tucker, PT Physical Therapist PT     05/02/22 -  Larisa Brito, SHANELL Physical Therapist PT                  PT Recommendation and Plan     Plan of Care Reviewed With: patient  Outcome Evaluation: Patient seen for PT session this AM. Patient supine in bed upon arrival. Patient appears alert and able to follow commands. Patient speaks very quietly. Difficult to understand at times. Patient sat up to EOB with modA-maxA. Patient leans R when sitting at EOB requiring modA to correct. Patient worked on shifting weight and leaning down onto L elbow and returning trunk to midline with CGA-Toribio. Improved sitting balance noted following. Patient able to scoot a few times while seated towards HOB before required modA-maxA to return to supine and scoot up in bed. Patient very fatigued limiting further mobility this date. Encouraged patient to continue performed B LE exercises in bed throughout the day. Patient would continue to benefit from skilled PT intervention to address deficits in functional mobility. PT will continue to monitor.     Time Calculation:         PT Charges       Row Name 10/30/23 1140             Time Calculation    Start Time 1106  -      Stop Time 1130  -      Time Calculation (min) 24 min  -      PT Received On 10/30/23  -      PT - Next Appointment 10/31/23  -SM         Time Calculation- PT    Total Timed Code Minutes- PT 24 minute(s)  -         Timed Charges    16294 - PT Therapeutic Activity Minutes 24  -          Total Minutes    Timed Charges Total Minutes 24  -SM       Total Minutes 24  -SM                User Key  (r) = Recorded By, (t) = Taken By, (c) = Cosigned By      Initials Name Provider Type    Larisa Barry PT Physical Therapist                  Therapy Charges for Today       Code Description Service Date Service Provider Modifiers Qty    [id number]  PT THERAPEUTIC ACT EA 15 MIN 10/30/2023 Larisa Brito PT GP 2            PT G-Codes  Outcome Measure Options: AM-PAC 6 Clicks Basic Mobility (PT)  AM-PAC 6 Clicks Score (PT): 8  AM-PAC 6 Clicks Score (OT): 9       Larisa Brito PT  10/30/2023

## 2023-10-30 NOTE — PROGRESS NOTES
Continued Stay Note  [facility]     Patient Name: Madhu Santoro  MRN: [id number]  Today's Date: 10/30/2023    Admit Date: 10/23/2023    Plan: Rehab   Discharge Plan       Row Name 10/30/23 1541       Plan    Plan Rehab    Plan Comments Follow up with pt's spouse, Brooke [phone] spouse requested a referral to [facility] for short-term rehab; referral sent to [facility]. Spoke with Joanne Micah will likely have a bed on Thur or Fri. Spouse is agreeable with [facility] as a back up plan. Tube feeds via [employer].  Patient is making improvements per Speech progress note. SNF cannot accept with NG TF, will need PEG if unable to tolerate PO. CCP will follow progress.                   Discharge Codes    No documentation.                 Expected Discharge Date and Time       Expected Discharge Date Expected Discharge Time    Nov 3, 2023               Haydee Valencia RN

## 2023-10-30 NOTE — PLAN OF CARE
Goal Outcome Evaluation:  Plan of Care Reviewed With: patient           Outcome Evaluation: Patient seen for PT session this AM. Patient supine in bed upon arrival. Patient appears alert and able to follow commands. Patient speaks very quietly. Difficult to understand at times. Patient sat up to EOB with modA-maxA. Patient leans R when sitting at EOB requiring modA to correct. Patient worked on shifting weight and leaning down onto L elbow and returning trunk to midline with CGA-Toribio. Improved sitting balance noted following. Patient able to scoot a few times while seated towards HOB before required modA-maxA to return to supine and scoot up in bed. Patient very fatigued limiting further mobility this date. Encouraged patient to continue performed B LE exercises in bed throughout the day. Patient would continue to benefit from skilled PT intervention to address deficits in functional mobility. PT will continue to monitor.

## 2023-10-30 NOTE — PROGRESS NOTES
"  Daily Progress Note.   [address]  10/30/2023    Patient:  Name:  Madhu Santoro  MRN:  [id number]  1945  77 y.o.  male         CC: covid ahrf    Interval History:  Doing well on room air  Weak but alert  Some irritatin in throat +cortrak  No acute events.  No cp.  Some cough    Physical Exam:  /91 (BP Location: Right arm, Patient Position: Lying)   Pulse 93   Temp 97.9 °F (36.6 °C) (Oral)   Resp 18   Ht 172.7 cm (68\")   Wt 69.2 kg (152 lb 8.9 oz)   SpO2 96%   BMI 23.20 kg/m²   Body mass index is 23.2 kg/m².    Intake/Output Summary (Last 24 hours) at 10/30/2023 1057  Last data filed at 10/30/2023 0653  Gross per 24 hour   Intake 2135 ml   Output --   Net 2135 ml     General appearance: Nontoxic, conversant   Eyes: anicteric sclerae, moist conjunctivae; no lidlag;    HENT: Atraumatic; oropharynx +cortrak  Lungs: some rhonchi no wheeze, with normal respiratory effort and no intercostal retractions  CV: RRR, no rub   Abdomen: Soft, nontender; BS+  Skin: WWP no diffuse visible rash  Psych: calm affect, alert   Neuro: Moves all ext. Grossly CN II-XII. Speech soft.    Data Review:  Notable Labs:  Results from last 7 days   Lab Units 10/27/23  0540 10/26/23  0229 10/25/23  0446 10/24/23  0443   WBC 10*3/mm3 10.54 16.18* 3.99 4.18   HEMOGLOBIN g/dL 10.6* 12.2* 13.2 11.9*   PLATELETS 10*3/mm3 156 145 133* 159     Results from last 7 days   Lab Units 10/30/23  0648 10/28/23  0706 10/27/23  0539 10/26/23  0229 10/25/23  1456 10/25/23  0446 10/24/23  0443   SODIUM mmol/L  --   --  142 138  --  130* 132*   POTASSIUM mmol/L 4.1  --  4.5 4.6  --  3.8 4.6   CHLORIDE mmol/L  --   --  114* 108*  --  97* 99   CO2 mmol/L  --   --  21.0* 18.6*  --  18.3* 21.5*   BUN mg/dL  --   --  34* 40*  --  21 15   CREATININE mg/dL  --   --  0.75* 0.92  --  1.05 0.97   GLUCOSE mg/dL  --   --  120* 136*  --  124* 84   CALCIUM mg/dL  --   --  8.2* 7.9*  --  8.4* 8.3*   MAGNESIUM mg/dL 2.2  --  3.1* 2.0  -- "  1.7 2.0   PHOSPHORUS mg/dL 2.3* 2.0* 2.2* 2.6 1.5* 2.1* 2.1*   Estimated Creatinine Clearance: 79.5 mL/min (A) (by C-G formula based on SCr of 0.75 mg/dL (L)).    Results from last 7 days   Lab Units 10/27/23  0540 10/27/23  0539 10/27/23  0004 10/26/23  1738 10/26/23  0229 10/25/23  0732 10/25/23  0446   AST (SGOT) U/L  --  189*  --   --  391*  --  194*   ALT (SGPT) U/L  --  134*  --   --  210*  --  86*   PROCALCITONIN ng/mL  --   --   --   --   --   --  3.88*   LACTATE mmol/L 1.2  --  2.4* 2.0  --    < > 3.4*   CRP mg/dL  --  6.92*  --   --   --   --   --    PLATELETS 10*3/mm3 156  --   --   --  145  --  133*    < > = values in this interval not displayed.       Results from last 7 days   Lab Units 10/25/23  0412   PH, ARTERIAL pH units 7.523*   PCO2, ARTERIAL mm Hg 26.0*   PO2 ART mm Hg 61.0*   HCO3 ART mmol/L 21.4*       Imaging:  Reviewed chest images personally from past 3 days    ASSESSMENT  /  PLAN:  COVID 19 pneumonia  Bacterial Pneumonia  Dysphagia  AFib    CAD s/p CABG  HTN  Chronic Pain    Completed course of cefepime  Remdesivir limited by transaminits  Con decadron for 10 days or hospital dc  Dysphagia plans noted.  Dw rn.    All issues new to me today.  Prior hospital course, labs and imaging reviewed.        Electronically signed by Albert Daly MD, 10/30/23, 3:29 PM EDT.

## 2023-10-30 NOTE — PROGRESS NOTES
"    DAILY PROGRESS NOTE  [facility]    Patient Identification:  Name: Madhu Santoro  Age: 77 y.o.  Sex: male  :  1945  MRN: [id number]         Primary Care Physician: Damian Sanchez MD    Subjective:  Interval History: Feeling a little bit better today.  He states his overall weakness seems a bit improved.  His voice is still soft and hoarse but I can understand him when intently listening.  He feels his breathing and shortness of breath are also improving and denies any nausea vomiting fever or chest pain.  Tolerating tube feeds without GI aversion.    Objective: Elderly and frail but clinically demonstrating improvement daily over the last couple days.  No family currently at bedside this AM    Scheduled Meds:[Held by provider] allopurinol, 300 mg, Oral, Daily  amitriptyline, 50 mg, Oral, Nightly  amLODIPine, 2.5 mg, Oral, Daily  apixaban, 5 mg, Oral, BID  cefepime, 2,000 mg, Intravenous, Q8H  dexAMETHasone, 6 mg, Intravenous, Daily  furosemide, 20 mg, Oral, Daily  nystatin, 5 mL, Swish & Spit, 4x Daily  oxybutynin XL, 5 mg, Oral, Daily  pantoprazole, 40 mg, Intravenous, Q AM      Continuous Infusions:     Vital signs in last 24 hours:  Temp:  [97.1 °F (36.2 °C)-98.9 °F (37.2 °C)] 97.9 °F (36.6 °C)  Heart Rate:  [84] 84  Resp:  [18] 18  BP: (155-167)/(81-91) 155/91    Intake/Output:    Intake/Output Summary (Last 24 hours) at 10/30/2023 1050  Last data filed at 10/30/2023 0653  Gross per 24 hour   Intake 2135 ml   Output --   Net 2135 ml       Exam:  /91 (BP Location: Right arm, Patient Position: Lying)   Pulse 84   Temp 97.9 °F (36.6 °C) (Oral)   Resp 18   Ht 172.7 cm (68\")   Wt 69.2 kg (152 lb 8.9 oz)   SpO2 96%   BMI 23.20 kg/m²     General Appearance:    Alert, cooperative, elderly and frail but demonstrating daily clinical improvement                         Throat:   Core track in place and functional                           Neck:   Supple, no JVD                     "     Lungs:    Diminished to auscultation bilaterally, respirations unlabored                 Chest Wall:    No tenderness or deformity                          Heart:    Regular rate and rhythm, S1 and S2 normal                  Abdomen:     Soft, nontender, bowel sounds active                 Extremities:   Pronounced generalized weakness though nothing focal                                     Data Review:  Labs in chart were reviewed.    Assessment:  Active Hospital Problems    Diagnosis  POA    **COVID-19 [U07.1]  Yes    Polycythemia [D75.1]  Yes    Chronic anticoagulation [Z79.01]  Not Applicable    PAD (peripheral artery disease) [I73.9]  Yes    Stenosis of carotid artery [I65.29]  Yes    S/P ablation of atrial flutter [Z98.890, Z86.79]  Not Applicable    S/P CABG (coronary artery bypass graft) [Z95.1]  Not Applicable    Coronary artery disease due to lipid rich plaque [I25.10, I25.83]  Yes    HLD (hyperlipidemia) [E78.5]  Yes    Benign essential hypertension [I10]  Yes    Cerebrovascular accident [I63.9]  Yes      Resolved Hospital Problems   No resolved problems to display.       Plan:    COVID-pneumonia with secondary bacterial component and resolving sepsis              -Decadron D6              -Cefepime D5 -completed              -Elevated LFTs due to infection/reactive -remdesivir discontinued -LFTs trending down              -O2 requirements continue to improve -NC at 3 L-1L              -Resume Lasix 10/29/2023 -will also help with labile BP        Dysphagia with normal diet prior to admission              -N.p.o. as per speech so core track ordered with tube feeds per nutrition, tolerated per patient thus far with hopes that early upcoming week, further clinical improvement is noted and we can perhaps advance diet p.o. may be later this week pending speech and VFSS   -Defer phosphorus replacement to nutrition/to the   -ENT consult per pulmonary recommendations for severe oropharyngeal dysphagia         A-fib with past history of ablation, CAD/CABG, HTN              -RC-AC with Eliquis              -HTN stable      Past history left MCA CVA/HLD     Past history of chronic pain on [place] and Elavil     Replacement of low phosphorus per tube feeds        Disposition -tube feeds over the weekend -hopeful this will be short-term and perhaps can utilize oral in very near future as he is making clinical improvements daily.  If by midweek he is still not safe for p.o. safety then I think we need to reconsider surgical consultation for PEG tube placement at that time is getting this gentleman to rehab sometime this upcoming workweek would be paramount              -CCP coordinating needs    -ENT to see in consultation    Everardo Sumner MD  10/30/2023  10:50 EDT

## 2023-10-30 NOTE — CONSULTS
Asked to see patient regarding dysphagia and dysphonia.    HPI: 77-year-old gentleman with recent fall secondary to increasing weakness over the course of about 1 week.  He also had a dry cough leading up to his fall.  He was diagnosed with COVID-19 and admitted to the hospital.  He has had further general deconditioning since admission.  He has been evaluated by physical therapy and initial evaluation by speech therapy.  CT scan of the head does reveal evidence of prior infarct although no acute findings are reportedly present.  I reviewed electronic medical record for pertinent PMH, PSH, family history, social history and review of systems.    Physical examination today while wearing appropriate PPE shows him to be severely deconditioned and unable to sit up on his own.  He can really barely talk above a whisper.  No evidence of any external neck abnormalities are observed.    A/P: 77-year-old gentleman with what looks to be fairly severe acute deconditioning presumably from his COVID-19 illness.  He has been having a cough prior to admission as well.  I think the biggest factor in his vocal issues and his swallowing issues are the severe deconditioning.  I would anticipate improvement in his voice and swallowing when he shows similar improvement elsewhere.  Continuing supportive care in the interim is prudent.  His swallowing probably should be evaluated with a modified barium swallow prior to oral intake being instituted although I do not see that being done effectively in the next couple of days given his general condition currently.  I suspect he will probably require some swallowing therapy over the course of time given his severe deconditioning that is visible.  I do not see any acute indication for flexible laryngoscopy at this time.

## 2023-10-30 NOTE — DISCHARGE PLACEMENT REQUEST
"Papito Santoro (77 y.o. Male)       Date of Birth   1945    Social Security Number       Address   [address] [place] [address]    Home Phone   [phone]    MRN   [id number]       [facility]    Marital Status                               Admission Date   10/23/23    Admission Type   Emergency    Admitting Provider   Jewell Rios MD    Attending Provider   Everardo Sumner MD    Department, Room/Bed   [address], S623/1       Discharge Date       Discharge Disposition       Discharge Destination                                 Attending Provider: Everardo Sumner MD    Allergies: Atorvastatin, Penicillins    Isolation: Enh Drop/Con   Infection: COVID (confirmed) (10/23/23)   Code Status: CPR    Ht: 172.7 cm (68\")   Wt: 69.2 kg (152 lb 8.9 oz)    Admission Cmt: None   Principal Problem: COVID-19 [U07.1]                   Active Insurance as of 10/23/2023       Primary Coverage       Payor Plan Insurance Group Employer/Plan Group    MEDICARE MEDICARE A & B        Payor Plan Address Payor Plan Phone Number Payor Plan Fax Number Effective Dates    [address] [phone]  12/1/2009 - None Entered    [facility] [id number]         Subscriber Name Subscriber Birth Date Member ID       PAPITO SANTORO 1945 [id number]               Secondary Coverage       Payor Plan Insurance Group Employer/Plan Group    [employer] [id number]       Payor Plan Address Payor Plan Phone Number Payor Plan Fax Number Effective Dates    [address] [phone]  12/1/2009 - None Entered    [place] [address]         Subscriber Name Subscriber Birth Date Member ID       PAPITO SANTORO 1945 [id number]                     Emergency Contacts        (Rel.) Home Phone Work Phone Mobile Phone    Brooke Santoro (Spouse) [phone] -- [phone]                "

## 2023-10-30 NOTE — PLAN OF CARE
Goal Outcome Evaluation:      Alert and oriented, 1L NC, tube feeds and IV abx continued. Oral care provided. Nystatin mouth wash ordered. Good urine output. Patients coccyx looks somewhat better than when he first came to unit. Frequent repositioning.

## 2023-10-30 NOTE — PLAN OF CARE
Goal Outcome Evaluation:              Outcome Evaluation: Re-evaluation of swallow completed. Improvements in secretion management noted this date. Voice continues to be weak. Weak cough following ice chip, thin by spoon, nectar thick liquid by spoon, honey thick liquid by spoon/cup, and puree trials. Recommend continue NPO. Meds via alternate route. Ice chips for comfort with known aspiration risk. Speech to follow at bedside for re-eval and monitor readiness for VFSS.

## 2023-10-31 LAB
ALBUMIN SERPL-MCNC: 2.9 G/DL (ref 3.5–5.2)
ALBUMIN/GLOB SERPL: 0.9 G/DL
ALP SERPL-CCNC: 147 U/L (ref 39–117)
ALT SERPL W P-5'-P-CCNC: 69 U/L (ref 1–41)
ANION GAP SERPL CALCULATED.3IONS-SCNC: 10 MMOL/L (ref 5–15)
AST SERPL-CCNC: 52 U/L (ref 1–40)
BILIRUB SERPL-MCNC: 0.6 MG/DL (ref 0–1.2)
BUN SERPL-MCNC: 29 MG/DL (ref 8–23)
BUN/CREAT SERPL: 34.5 (ref 7–25)
CALCIUM SPEC-SCNC: 8.6 MG/DL (ref 8.6–10.5)
CHLORIDE SERPL-SCNC: 101 MMOL/L (ref 98–107)
CO2 SERPL-SCNC: 21 MMOL/L (ref 22–29)
CREAT SERPL-MCNC: 0.84 MG/DL (ref 0.76–1.27)
DEPRECATED RDW RBC AUTO: 51.9 FL (ref 37–54)
EGFRCR SERPLBLD CKD-EPI 2021: 89.3 ML/MIN/1.73
ERYTHROCYTE [DISTWIDTH] IN BLOOD BY AUTOMATED COUNT: 12.6 % (ref 12.3–15.4)
GLOBULIN UR ELPH-MCNC: 3.3 GM/DL
GLUCOSE BLDC GLUCOMTR-MCNC: 158 MG/DL (ref 70–130)
GLUCOSE BLDC GLUCOMTR-MCNC: 178 MG/DL (ref 70–130)
GLUCOSE BLDC GLUCOMTR-MCNC: 229 MG/DL (ref 70–130)
GLUCOSE BLDC GLUCOMTR-MCNC: 338 MG/DL (ref 70–130)
GLUCOSE SERPL-MCNC: 184 MG/DL (ref 65–99)
HCT VFR BLD AUTO: 39.5 % (ref 37.5–51)
HGB BLD-MCNC: 13.6 G/DL (ref 13–17.7)
MCH RBC QN AUTO: 38.3 PG (ref 26.6–33)
MCHC RBC AUTO-ENTMCNC: 34.4 G/DL (ref 31.5–35.7)
MCV RBC AUTO: 111.3 FL (ref 79–97)
PLATELET # BLD AUTO: 182 10*3/MM3 (ref 140–450)
PMV BLD AUTO: 12.2 FL (ref 6–12)
POTASSIUM SERPL-SCNC: 4.6 MMOL/L (ref 3.5–5.2)
PROT SERPL-MCNC: 6.2 G/DL (ref 6–8.5)
RBC # BLD AUTO: 3.55 10*6/MM3 (ref 4.14–5.8)
SODIUM SERPL-SCNC: 132 MMOL/L (ref 136–145)
WBC NRBC COR # BLD: 19.35 10*3/MM3 (ref 3.4–10.8)

## 2023-10-31 PROCEDURE — 25010000002 DEXAMETHASONE PER 1 MG: Performed by: INTERNAL MEDICINE

## 2023-10-31 PROCEDURE — 85027 COMPLETE CBC AUTOMATED: CPT | Performed by: HOSPITALIST

## 2023-10-31 PROCEDURE — 80053 COMPREHEN METABOLIC PANEL: CPT | Performed by: HOSPITALIST

## 2023-10-31 PROCEDURE — 82948 REAGENT STRIP/BLOOD GLUCOSE: CPT

## 2023-10-31 PROCEDURE — 63710000001 INSULIN REGULAR HUMAN PER 5 UNITS: Performed by: HOSPITALIST

## 2023-10-31 RX ORDER — DEXTROSE MONOHYDRATE 25 G/50ML
25 INJECTION, SOLUTION INTRAVENOUS
Status: DISCONTINUED | OUTPATIENT
Start: 2023-10-31 | End: 2023-11-09 | Stop reason: HOSPADM

## 2023-10-31 RX ORDER — IBUPROFEN 600 MG/1
1 TABLET ORAL
Status: DISCONTINUED | OUTPATIENT
Start: 2023-10-31 | End: 2023-11-09 | Stop reason: HOSPADM

## 2023-10-31 RX ORDER — NICOTINE POLACRILEX 4 MG
15 LOZENGE BUCCAL
Status: DISCONTINUED | OUTPATIENT
Start: 2023-10-31 | End: 2023-11-09 | Stop reason: HOSPADM

## 2023-10-31 RX ORDER — SUCRALFATE 1 G/1
1 TABLET ORAL
Status: DISCONTINUED | OUTPATIENT
Start: 2023-10-31 | End: 2023-11-09 | Stop reason: HOSPADM

## 2023-10-31 RX ADMIN — ACETAMINOPHEN 650 MG: 325 SUSPENSION ORAL at 08:44

## 2023-10-31 RX ADMIN — Medication 1 APPLICATION: at 16:15

## 2023-10-31 RX ADMIN — APIXABAN 5 MG: 5 TABLET, FILM COATED ORAL at 21:15

## 2023-10-31 RX ADMIN — PANTOPRAZOLE SODIUM 40 MG: 40 INJECTION, POWDER, FOR SOLUTION INTRAVENOUS at 06:14

## 2023-10-31 RX ADMIN — SUCRALFATE 1 G: 1 TABLET ORAL at 16:13

## 2023-10-31 RX ADMIN — LANSOPRAZOLE 30 MG: 15 TABLET, ORALLY DISINTEGRATING ORAL at 18:12

## 2023-10-31 RX ADMIN — Medication 1 APPLICATION: at 21:15

## 2023-10-31 RX ADMIN — INSULIN HUMAN 7 UNITS: 100 INJECTION, SOLUTION PARENTERAL at 18:12

## 2023-10-31 RX ADMIN — APIXABAN 5 MG: 5 TABLET, FILM COATED ORAL at 08:44

## 2023-10-31 RX ADMIN — DEXAMETHASONE SODIUM PHOSPHATE 6 MG: 10 INJECTION INTRAMUSCULAR; INTRAVENOUS at 08:44

## 2023-10-31 RX ADMIN — INSULIN HUMAN 4 UNITS: 100 INJECTION, SOLUTION PARENTERAL at 23:58

## 2023-10-31 RX ADMIN — AMLODIPINE BESYLATE 2.5 MG: 5 TABLET ORAL at 08:44

## 2023-10-31 RX ADMIN — AMITRIPTYLINE HYDROCHLORIDE 50 MG: 50 TABLET, FILM COATED ORAL at 21:15

## 2023-10-31 RX ADMIN — FUROSEMIDE 20 MG: 20 TABLET ORAL at 08:43

## 2023-10-31 NOTE — CONSULTS
"Nutrition Services    Patient Name:  Madhu Santoro  YOB: 1945  MRN: [id number]  Admit Date:  10/23/2023    Assessment Date:  10/31/23    Summary: TF follow up  TFs of Fibersource HN running at goal rate of 65 mL/hr.  RN reports having some indigestion issues today.      ENT eval due to dysphagia and dysphonia.  SOB and weakness improving.  S/p SLP re-evaluation yesterday, failed.  May need to consider PEG.    Recommend:  Continue TF's of Fibersource HN @ goal rate of 65 ml/hr with 35 ml/hr free water flushes.   Advance diet as tolerates per SLP recommendations.     Will follow closely for tolerance and ability to advance diet.    CLINICAL NUTRITION ASSESSMENT      Reason for Assessment Follow-up Protocol, Tube Feeding Assessment      Diagnosis/Problem   Fall at home, weakness, malaise, fatigue  Dx: COVID-19, HTN, PAD, chronic anticoagulation, Aflutter s/p ablation, s/p CABG, CAD. HTN, HLD, CVA, chronic pain, gout     Anthropometrics        Current Height  Current Weight  BMI kg/m2 Height: 172.7 cm (68\")  Weight: 67 kg (147 lb 11.3 oz) (10/31/23 0128)  Body mass index is 22.46 kg/m².   Adjusted BMI (if applicable)    BMI Category Normal/Healthy (18.4 - 24.9)   Ideal Body Weight (IBW) 150 lb   Usual Body Weight (UBW) 170-174 lb   Weight Trend Loss, Amount/Timeframe: 13 lb (7.5%) wt loss x 1 week   --  Estimated/Assessed Needs        Current Weight  Weight: 67 kg (147 lb 11.3 oz) (10/31/23 0128)       Energy Requirements    Weight for Calculation 73 kg   Method for Estimation  25 kcal/kg   EST Needs (kcal/day) 1825 kcals       Protein Requirements    Weight for Calculation 73 kg   EST Protein Needs (g/kg) 1.2 - 1.5 gm/kg   EST Daily Needs (g/day)  g       Fluid Requirements     Method for Estimation 30 mL/kg    EST Needs (mL/day) 2190 ml     Labs       Pertinent Labs    Results from last 7 days   Lab Units 10/31/23  0541 10/30/23  0648 10/27/23  0539 10/26/23  0229   SODIUM mmol/L 132*  --  142 " 138   POTASSIUM mmol/L 4.6 4.1 4.5 4.6   CHLORIDE mmol/L 101  --  114* 108*   CO2 mmol/L 21.0*  --  21.0* 18.6*   BUN mg/dL 29*  --  34* 40*   CREATININE mg/dL 0.84  --  0.75* 0.92   CALCIUM mg/dL 8.6  --  8.2* 7.9*   BILIRUBIN mg/dL 0.6  --  0.5 0.6   ALK PHOS U/L 147*  --  154* 217*   ALT (SGPT) U/L 69*  --  134* 210*   AST (SGOT) U/L 52*  --  189* 391*   GLUCOSE mg/dL 184*  --  120* 136*     Results from last 7 days   Lab Units 10/31/23  0541 10/30/23  0648 10/27/23  0540 10/27/23  0539 10/26/23  0229   MAGNESIUM mg/dL  --  2.2  --  3.1* 2.0   PHOSPHORUS mg/dL  --  2.3*   < > 2.2* 2.6   HEMOGLOBIN g/dL 13.6  --    < >  --  12.2*   HEMATOCRIT % 39.5  --    < >  --  34.1*   WBC 10*3/mm3 19.35*  --    < >  --  16.18*   ALBUMIN g/dL 2.9*  --   --  2.7* 3.0*    < > = values in this interval not displayed.     Results from last 7 days   Lab Units 10/31/23  0541 10/27/23  0540 10/26/23  0229 10/25/23  0446   PLATELETS 10*3/mm3 182 156 145 133*     COVID19   Date Value Ref Range Status   10/23/2023 Detected (C) Not Detected - Ref. Range Final     Lab Results   Component Value Date    HGBA1C 6.30 (H) 02/20/2023          Medications           Scheduled Medications [Held by provider] allopurinol, 300 mg, Oral, Daily  amitriptyline, 50 mg, Oral, Nightly  amLODIPine, 2.5 mg, Oral, Daily  apixaban, 5 mg, Oral, BID  furosemide, 20 mg, Oral, Daily  lansoprazole, 30 mg, Nasogastric, QAM AC  Menthol-Zinc Oxide, 1 application , Topical, 4x Daily  oxybutynin XL, 5 mg, Oral, Daily  sucralfate, 1 g, Oral, BID AC       Infusions     PRN Medications   acetaminophen **OR** acetaminophen **OR** acetaminophen    acetaminophen **OR** acetaminophen    benzocaine-menthol    senna-docusate sodium **AND** polyethylene glycol **AND** bisacodyl **AND** bisacodyl    guaifenesin    HYDROcodone Bit-Homatrop MBr    HYDROcodone-acetaminophen    nitroglycerin    ondansetron **OR** ondansetron    phenol    [COMPLETED] Insert Peripheral IV **AND** sodium  chloride    sodium chloride     Physical Findings          General Findings alert, disoriented, impaired speech, on oxygen therapy, disoriented to time   Oral/Mouth Cavity dry mouth, tooth or teeth missing   Edema  lower extremity , 1+ (trace)   Gastrointestinal fecal incontinence, non-distended , last bowel movement: 10/30   Skin  bruising, pressure injury: coccyx- deep tissue   Tubes/Drains/Lines [place], ND tube, bridle in place   NFPE No clinical signs of muscle wasting or fat loss   --  Current Nutrition Orders & Evaluation of Intake       Oral Nutrition     Food Allergies NKFA   Current PO Diet NPO Diet NPO Type: Strict NPO   Supplement n/a   PO Evaluation     % PO Intake N/a    Factors Affecting Intake: swallow impairment, weakness   --  PES STATEMENT / NUTRITION DIAGNOSIS      Nutrition Dx Problem  Problem: Inadequate Protein Energy Intake  Etiology: Factors Affecting Nutrition - swallow impairment    Signs/Symptoms: NPO and SLP/Swallow Evaluation     NUTRITION INTERVENTION / PLAN OF CARE      Intervention Goal(s) Maintain nutrition status, Reduce/improve symptoms, Meet estimated needs, Disease management/therapy, Initiate TF/PN, Tolerate TF/PN at goal, Transition TF to PO, and Maintain weight         RD Intervention/Action Continue to monitor and Care plan reviewed   --      Prescription/Orders:       PO Diet       Supplements       Enteral Nutrition    Enteral Prescription:     Enteral Route ND    TF Delivery Method Continuous    Enteral Product Fibersource HN    Modular None    Propofol Rate/Kcal     TF Start Rate  25 mL/hr    TF Goal Rate  65 mL/hr    Free Water Flush 35 mL Q 1 hr    Provision at Goal:          Calories 1872 kcal, meets 100 % needs         Protein  84 gm protein, meets 97 % needs         Fluid (mL) 1260 mL free water + 840 mL in flushes    Prescription Ordered Continue same per protocol         Parenteral Nutrition    New Prescription Ordered? Continue same per protocol   --       Monitor/Evaluation Per protocol   Discharge Plan/Needs Pending clinical course   --    RD to follow per protocol.      Electronically signed by:  Marilee Calderon RD  10/31/23 14:09 EDT

## 2023-10-31 NOTE — NURSING NOTE
CWON note: pt seen for follow up evaluation of sacral skin. Pt is alert, but forgetful, Geronimo 14.  He continues to be weak requiring assistance of 2 to turn and reposition in bed, incontinent of bowel and bladder.  He is on a VINNY mattress for adequate pressure redistribution.  He has blanchable redness to thony buttocks. Gluteal cleft has linear partial thickness skin loss,  purple maroon discoloration; this is limited to gluteal cleft and appears to be related to intertriginous dermatitis from moisture trapping and pressure from the thony buttocks. Barrier cream being applied, will add Calmoseptine ointment 4x day. Heels with blanchable erythema, heels are off-loaded with pillows. WOC nurse will sign off, please re-consult for any additional needs.

## 2023-10-31 NOTE — PROGRESS NOTES
"  Daily Progress Note.   [address]  10/31/2023    Patient:  Name:  Madhu Santoro  MRN:  [id number]  1945  77 y.o.  male         CC: covid [facility]    Interval History:  Weak appearing  Apparently give po by spouse overnight per chart reviewe  No fever  Wbc increased this am  Confused weak appearing poor historian.  History limited by this.  States has reflux repeatedly.    Physical Exam:  /61 (BP Location: Right arm, Patient Position: Lying)   Pulse 109   Temp 97.9 °F (36.6 °C) (Oral)   Resp 18   Ht 172.7 cm (68\")   Wt 67 kg (147 lb 11.3 oz)   SpO2 91%   BMI 22.46 kg/m²   Body mass index is 22.46 kg/m².    Intake/Output Summary (Last 24 hours) at 10/31/2023 1344  Last data filed at 10/31/2023 0840  Gross per 24 hour   Intake 1702 ml   Output --   Net 1702 ml     General appearance: ill weak appearing, conversant   Eyes: anicteric sclerae, moist conjunctivae; no lidlag;    HENT: Atraumatic; oropharynx +cortrak  Lungs:   rhonchi no wheeze, with normal respiratory effort and no intercostal retractions  CV: RRR, no rub   Abdomen: Soft, non rigid; BS+  Skin: WWP no diffuse visible rash  Psych: calm affect, alert   Neuro: Moves all ext. Grossly CN II-XII. Speech soft difficult to understand    Data Review:  Notable Labs:  Results from last 7 days   Lab Units 10/31/23  0541 10/27/23  0540 10/26/23  0229 10/25/23  0446   WBC 10*3/mm3 19.35* 10.54 16.18* 3.99   HEMOGLOBIN g/dL 13.6 10.6* 12.2* 13.2   PLATELETS 10*3/mm3 182 156 145 133*     Results from last 7 days   Lab Units 10/31/23  0541 10/30/23  0648 10/28/23  0706 10/27/23  0539 10/26/23  0229 10/25/23  1456 10/25/23  0446   SODIUM mmol/L 132*  --   --  142 138  --  130*   POTASSIUM mmol/L 4.6 4.1  --  4.5 4.6  --  3.8   CHLORIDE mmol/L 101  --   --  114* 108*  --  97*   CO2 mmol/L 21.0*  --   --  21.0* 18.6*  --  18.3*   BUN mg/dL 29*  --   --  34* 40*  --  21   CREATININE mg/dL 0.84  --   --  0.75* 0.92  --  1.05   GLUCOSE " [facility]  INTENSIVE CARE PROGRESS NOTE       Patient: Prashanth Clark Attending: Bisi Bland MD   male, 59 year old  Admit Date: 6/27/2023             NEW EVENTS:    T-max of 100.8 F overnight, platelet count at 91, QTc 504        Assessment & Plan      Bradycardia with second degree AV blocks, alcohol withdrawal, pulmonary embolism, alcohol intoxication, hypertension, acute kidney injury, possible aspiration pneumonitis/pneumonia, retrosternal hematoma, rib fractures, trans-amnitis, fatty liver, lactic acidosis, hypomagnesemia, thrombocytopenia       EP consulted   Plan for pacemaker placement today  Isuprel  Bumex once  Optimize electrolytes  Phenobarb taper   Hold AV ila blocking agents  Empiric Ceftriaxone  On heparin drip therapeutic  Hematology consulted  CIWA  Monitor closely          PAST MEDICAL HISTORY    Past Medical History:   Diagnosis Date   • Alcohol abuse    • Chronic pain     Lt knee   • Essential (primary) hypertension    • PONV (postoperative nausea and vomiting)        SURGICAL HISTORY    Past Surgical History:   Procedure Laterality Date   • Back surgery     • Foot surgery  2016    Left Hallux Metatarsophalangeal Fusion   • Hernia repair  01/2020   • Joint replacement     • Knee surgery         FAMILY HISTORY    History reviewed. No pertinent family history.    SOCIAL HISTORY    Social History     Tobacco Use   • Smoking status: Never   • Smokeless tobacco: Never   Vaping Use   • Vaping Use: never used   Substance Use Topics   • Alcohol use: Yes     Alcohol/week: 14.0 standard drinks of alcohol     Types: 14 Shots of liquor per week     Comment: Vodka drink twice a day   • Drug use: No       CURRENT MEDICATIONS    Current Facility-Administered Medications   Medication Dose Route Frequency Provider Last Rate Last Admin   • vancomycin 1,500 mg in sodium chloride 0.9% 250 mL IVPB  1,500 mg Intravenous On Call to Procedure Luis Eduardo Franco MD       • ceFAZolin  (ANCEF) syringe 2,000 mg  2,000 mg Intravenous On Call to Procedure Luis Eduardo Franco MD       • mupirocin (BACTROBAN) 2 % ointment 1 application.  1 application. Each Nare On Call to Procedure Luis Eduardo Franco MD       • bumetanide (BUMEX) injection 0.5 mg  0.5 mg Intravenous Once Bisi Bland MD       • petrolatum (white)-mineral oil (LUBRIFRESH PM) ophthalmic ointment   Both Eyes Q4H Rex Lindsay MD   Given at 07/02/23 0748   • PHENobarbital (LUMINAL) 65 MG/ML injection 65 mg  65 mg Intravenous 3 times per day Bisi Bland MD        Followed by   • [START ON 7/4/2023] PHENobarbital (LUMINAL) 65 MG/ML injection 32.5 mg  32.5 mg Intravenous 3 times per day Bisi Bland MD       • docusate sodium (COLACE) 50 MG/5ML liquid 100 mg  100 mg Per OG Tube Daily Bisi Bland MD   100 mg at 07/01/23 0755   • LORazepam (ATIVAN) injection 4 mg  4 mg Intravenous Once Rex Lindsay MD       • chlorhexidine gluconate (PERIDEX) 0.12 % solution 15 mL  15 mL Swish & Spit 2 times per day Rex Lindsay MD   15 mL at 07/01/23 2000   • [Held by provider] chlordiazePOXIDE (LIBRIUM) capsule 25 mg  25 mg Per OG Tube Q6H Bisi Bland MD   25 mg at 06/29/23 0823   • famotidine (PEPCID) tablet 20 mg  20 mg Per OG Tube 2 times per day Bisi Bland MD   20 mg at 07/01/23 2000   • folic acid (FOLATE) tablet 1 mg  1 mg Per OG Tube Daily Bisi Bland MD   1 mg at 07/01/23 0755   • polyethylene glycol (MIRALAX) packet 17 g  17 g Per OG Tube Daily Bisi Bland MD   17 g at 07/01/23 0755   • thiamine (VITAMIN B1) tablet 100 mg  100 mg Per OG Tube Daily Bisi Bland MD   100 mg at 07/01/23 0755   • cefTRIAXone (ROCEPHIN) syringe 2,000 mg  2,000 mg Intravenous Daily Domonique Hackett APNP   2,000 mg at 07/01/23 0755   • insulin lispro (ADMELOG,HumaLOG) - Correction Dose   Subcutaneous 4 times per day Domonique Hackett APNP       • sodium chloride (PF) 0.9 %  mg/dL 184*  --   --  120* 136*  --  124*   CALCIUM mg/dL 8.6  --   --  8.2* 7.9*  --  8.4*   MAGNESIUM mg/dL  --  2.2  --  3.1* 2.0  --  1.7   PHOSPHORUS mg/dL  --  2.3* 2.0* 2.2* 2.6 1.5* 2.1*   Estimated Creatinine Clearance: 68.7 mL/min (by C-G formula based on SCr of 0.84 mg/dL).    Results from last 7 days   Lab Units 10/31/23  0541 10/27/23  0540 10/27/23  0539 10/27/23  0004 10/26/23  1738 10/26/23  0229 10/25/23  0732 10/25/23  0446   AST (SGOT) U/L 52*  --  189*  --   --  391*  --  194*   ALT (SGPT) U/L 69*  --  134*  --   --  210*  --  86*   PROCALCITONIN ng/mL  --   --   --   --   --   --   --  3.88*   LACTATE mmol/L  --  1.2  --  2.4* 2.0  --    < > 3.4*   CRP mg/dL  --   --  6.92*  --   --   --   --   --    PLATELETS 10*3/mm3 182 156  --   --   --  145  --  133*    < > = values in this interval not displayed.       Results from last 7 days   Lab Units 10/25/23  0412   PH, ARTERIAL pH units 7.523*   PCO2, ARTERIAL mm Hg 26.0*   PO2 ART mm Hg 61.0*   HCO3 ART mmol/L 21.4*       Imaging:  Reviewed chest images personally from past 3 days    ASSESSMENT  /  PLAN:  COVID 19 pneumonia  Bacterial Pneumonia  Dysphagia  AFib    CAD s/p CABG  HTN  Chronic Pain    Completed course of cefepime  Remdesivir limited by transaminits - improving  Cont decadron for 10 days or hospital dc  Dysphagia plans noted.  Monitor for any fever or worsening abd pain.  Airway clr challenging with pt weakness overall  Lansoprazole.    Electronically signed by Albert Daly MD, 10/31/23, 2:30 PM EDT.           injection 2 mL  2 mL Intracatheter 2 times per day eRx Lindsay MD   2 mL at 07/01/23 2000   • Potassium Standard Replacement Protocol (Levels 3.5 and lower)   Does not apply See Admin Instructions Rex Lindsay MD       • Magnesium Standard Replacement Protocol   Does not apply See Admin Instructions Rex Lindsay MD       • Phosphorus Standard Replacement Protocol   Does not apply See Admin Instructions Rex Lindsay MD       • Potassium Replacement (Levels 3.6 - 4)   Does not apply See Admin Instructions Rex Lindsay MD              CONTINUOUS INFUSIONS  Current Facility-Administered Medications   Medication Dose Route Frequency Provider Last Rate Last Admin   • MIDazolam (VERSED) 100 mg/100 mL in saline infusion  0-20 mg/hr Intravenous Continuous Rex Lindsay MD 20 mL/hr at 07/02/23 0746 20 mg/hr at 07/02/23 0746   • fentaNYL (SUBLIMAZE) 2,500 mcg/250 mL in sodium chloride 0.9 % infusion  0-250 mcg/hr Intravenous Continuous Rex Lindsay MD 25 mL/hr at 07/02/23 0240 250 mcg/hr at 07/02/23 0240   • NORepinephrine (LEVOPHED) 8 mg/250 mL in dextrose 5 % infusion  0-30 mcg/min Intravenous Continuous Rex Lindsay MD   Stopped at 06/30/23 1044   • isoproterenol (ISUPREL) 1 mg/250 mL in sodium chloride 0.9 % infusion  0-2 mcg/min Intravenous Continuous Luis Eduardo Franco MD 30 mL/hr at 07/02/23 0510 2 mcg/min at 07/02/23 0510   • sodium chloride 0.9% infusion   Intravenous Continuous PRN Rex Lindsay MD       • sodium chloride 0.9% infusion   Intravenous Continuous PRN Rex Lindsay MD              PRN MEDICATIONS  Current Facility-Administered Medications   Medication Dose Route Frequency Provider Last Rate Last Admin   • fentaNYL (SUBLIMAZE) injection 100 mcg  100 mcg Intravenous Q2H PRN Domonique Hackett R, APNP   100 mcg at 07/02/23 0130   • bisacodyl (DULCOLAX) suppository 10 mg  10 mg Rectal Daily PRN Domonique Hackett R, APNP       • LORazepam  (ATIVAN) tablet 2 mg  2 mg Per NG Tube Q1H PRN Bisi Bland MD        Or   • LORazepam (ATIVAN) injection 2 mg  2 mg Intravenous Q1H PRN Bisi Bland MD        Or   • LORazepam (ATIVAN) injection 2 mg  2 mg Intramuscular Q1H PRN Bisi Bland MD       • LORazepam (ATIVAN) tablet 3 mg  3 mg Per NG Tube Q1H PRN Bisi Bland MD        Or   • LORazepam (ATIVAN) tablet 4 mg  4 mg Oral Q1H PRN Bisi Bland MD        Or   • LORazepam (ATIVAN) injection 3 mg  3 mg Intravenous Q1H PRN Bisi Bland MD        Or   • LORazepam (ATIVAN) injection 4 mg  4 mg Intravenous Q1H PRN Bisi Bland MD       • acetaminophen-codeine (TYLENOL NO.3) 300-30 MG per tablet 1 tablet  1 tablet Per OG Tube Q4H PRN Bisi Bland MD       • ALPRAZolam (XANAX) tablet 0.25 mg  0.25 mg Per OG Tube Q24H PRN Bisi Bland MD   0.25 mg at 07/02/23 0100   • diazePAM (VALIUM) tablet 10 mg  10 mg Per OG Tube Q1H PRN Bisi Bland MD       • lipase-protease-amylase 10,440-39,150-39,150 units (VIOKACE) per tablet 2 tablet  2 tablet Per G Tube PRN Domonique Hackett APNP        And   • sodium bicarbonate tablet 650 mg  650 mg Per G Tube PRN Domonique Hackett APNP       • acetaminophen (TYLENOL) tablet 650 mg  650 mg Per OG Tube Q4H PRN Bisi Bland MD   650 mg at 07/02/23 0250   • MIDazolam (VERSED) bolus from bag 5 mg  5 mg Intravenous Q1H PRN Dwight Ribeiro PA-C   5 mg at 07/02/23 0200   • sodium chloride 0.9 % injector flush 100 mL  100 mL Injection PRN Gerson Womack MD   100 mL at 06/27/23 1954   • sodium chloride 0.9 % flush bag 25 mL  25 mL Intravenous PRN Rex Lindsay MD       • sodium chloride 0.9% infusion   Intravenous Continuous PRN Rex Lindsay MD       • sodium chloride 0.9% infusion   Intravenous Continuous PRN Rex Lindsay MD       • sodium chloride (NORMAL SALINE) 0.9 % bolus 500 mL  500 mL Intravenous PRN Rex Lindsay MD       • sodium chloride  0.9 % flush bag 25 mL  25 mL Intravenous PRN Rex Lindsay MD       • ondansetron (ZOFRAN ODT) disintegrating tablet 4 mg  4 mg Oral Q12H PRN Rex Lindsay MD        Or   • ondansetron (ZOFRAN) injection 4 mg  4 mg Intravenous Q12H PRN Rex Lindsay MD   4 mg at 06/27/23 2310   • dextrose 50 % injection 25 g  25 g Intravenous PRN Rex Lindsay MD       • dextrose 50 % injection 12.5 g  12.5 g Intravenous PRN Rex Lindsay MD       • glucagon (GLUCAGEN) injection 1 mg  1 mg Intramuscular PRN Rex Lindsay MD       • dextrose (GLUTOSE) 40 % gel 15 g  15 g Oral PRN Rex Lindsay MD       • dextrose (GLUTOSE) 40 % gel 30 g  30 g Oral PRN Rex Lindsay MD       • fentaNYL (SUBLIMAZE) injection 25 mcg  25 mcg Intravenous Q1H PRN Rex Lindsay MD   25 mcg at 06/28/23 0445   • heparin (porcine) injection 7,900 Units  80 Units/kg (Dosing Weight) Intravenous PRN Rex Lindsay MD       • heparin (porcine) injection 3,900 Units  40 Units/kg (Dosing Weight) Intravenous PRN Rex Lindsay MD   3,900 Units at 07/01/23 1335   • hydrALAZINE (APRESOLINE) injection 10-20 mg  10-20 mg Intravenous Q6H PRN Rex Lindsay MD   20 mg at 07/02/23 0000       ALLERGIES    ALLERGIES:   Allergen Reactions   • Sporanox HIVES   • Vicodin [Hydrocodone-Acetaminophen] NAUSEA   • Tramadol NAUSEA and Nausea & Vomiting           PHYSICAL EXAM    Vital 24 Hour Range Most Recent Value   Temperature Temp  Min: 97.9 °F (36.6 °C)  Max: 101.5 °F (38.6 °C) 99.5 °F (37.5 °C)   Pulse Pulse  Min: 47  Max: 137 (!) 53   Respiratory Resp  Min: 15  Max: 19 16   Blood Pressure BP  Min: 115/57  Max: 170/78 115/57   Pulse Oximetry SpO2  Min: 92 %  Max: 100 % 95 %   Art. BP No data recorded     O2 No data recorded       Vital Most Recent Value First Value   Weight 108.4 kg (238 lb 15.7 oz) Weight: 100.4 kg (221 lb 5.5 oz)   Height 6' 2\" (188 cm) Height: 6' 2\" (188 cm)   BMI 30.68 N/A          Neurologic:  Sedated  Chest:  Symmetric  Lungs:  Clear to auscultation   Heart:  Regular rate and rhythm  Abdomen: Soft  Extremities: Edema present        Labs      ABGs:    Recent Labs   Lab 06/29/23  0200   APH 7.44   APCO2 38   APO2 76*   AHCO3 25       Laboratory Results:    Recent Labs   Lab 07/02/23  0409 07/01/23 2024 07/01/23  1155 07/01/23  0422 06/30/23  1033 06/30/23  0336 06/29/23  2126 06/29/23  1705 06/29/23  1500 06/29/23  1224 06/29/23  0501 06/28/23  1019 06/28/23  0548 06/27/23  2255 06/27/23 1929 06/27/23 1929   SODIUM 137  --   --  136  --  139  --   --   --   --  135  --  140  --   --  139   POTASSIUM 4.1  --   --  3.8 3.6 3.5  --   --   --   --  4.3  --  4.6  --   --  3.3*   CHLORIDE 104  --   --  106  --  107  --   --   --   --  104  --  106  --   --  105   CO2 24  --   --  26  --  26  --   --   --   --  24  --  17*  --   --  19*   BUN 8  --   --  6  --  13  --   --   --   --  23*  --  32*  --   --  29*   CREATININE 0.68  --   --  0.73  --  0.78  --   --   --   --  0.80  --  1.07  --   --  1.14  1.60*   GLUCOSE 97  --   --  130*  --  110*  --   --   --   --  144*  --  87  --   --  78   PHOS 3.5  --   --  1.9*  --  2.4  --   --   --  1.6*  --   --  3.9  --   --   --    AST  --   --   --   --   --  39*  --   --   --   --  64*  --  200*  --   --  216*   GPT  --   --   --   --   --  42  --   --   --   --  55  --  94*  --   --  129*   BILIRUBIN  --   --   --   --   --  1.6*  --   --   --   --  2.3*  --  1.6*  --   --  1.8*   WBC 4.5  --   --  5.3  --  6.0  --  7.6  --   --  8.0  8.0  --  6.9 8.1  --  8.6   HGB 9.6*  --   --  9.4*  --  9.8*  --  10.4*  --   --  10.6*  10.6*  --  11.3* 12.5*  --  14.2   HCT 28.7*  --   --  28.4*  --  29.6*  --  30.5*  --   --  30.7*  30.7*  --  33.3* 37.5*  --  41.6   PLT 91*  --   --  66*  --  40*  --  46*  --   --  50*  50*  --  61* 82*  --  113*   PT  --   --   --   --   --   --   --   --   --   --   --   --   --  10.4  --   --    INR  --   --   --    --   --   --   --   --   --   --   --   --   --  1.0  --   --    PTT 26 51* 39* 38*  --  50*   < >  --    < >  --  50*   < > 163* 26   < >  --     < > = values in this interval not displayed.           PROBLEM LIST      Active Hospital Problems    Fever      Hypotension      Pulmonary hypertension (CMD)      Alcohol withdrawal syndrome, with delirium (CMD)      Pulmonary edema      *Cardiac arrest (CMD)      Bradycardia      Rib fractures      Pulmonary embolism (CMD)      ARF (acute renal failure) (CMD)      Transaminitis      Alcohol intoxication (CMD)      Fatty liver           Critical care time was 30 minutes.       Bisi Bland MD  7/2/2023  7:52 AM  Ext: [phone]

## 2023-10-31 NOTE — PROGRESS NOTES
"    DAILY PROGRESS NOTE  [facility]    Patient Identification:  Name: Madhu Santoro  Age: 77 y.o.  Sex: male  :  1945  MRN: [id number]         Primary Care Physician: Damian Sanchez MD    Subjective:  Interval History: He is a bit more tired today.  Otherwise he is feeling small increments better each day.  Tolerating tube feeds with no nausea or vomiting.  A bit more sleepy today.    Objective: Case discussed with spouse at bedside as well as managing RN -asked and advised spouse to not give any p.o. intake as he is currently is strict n.p.o.    Scheduled Meds:[Held by provider] allopurinol, 300 mg, Oral, Daily  amitriptyline, 50 mg, Oral, Nightly  amLODIPine, 2.5 mg, Oral, Daily  apixaban, 5 mg, Oral, BID  dexAMETHasone, 6 mg, Intravenous, Daily  furosemide, 20 mg, Oral, Daily  oxybutynin XL, 5 mg, Oral, Daily  pantoprazole, 40 mg, Intravenous, Q AM      Continuous Infusions:     Vital signs in last 24 hours:  Temp:  [97.7 °F (36.5 °C)-99.3 °F (37.4 °C)] 97.9 °F (36.6 °C)  Heart Rate:  [] 109  Resp:  [18] 18  BP: (138-158)/(61-90) 158/61    Intake/Output:    Intake/Output Summary (Last 24 hours) at 10/31/2023 1104  Last data filed at 10/31/2023 0840  Gross per 24 hour   Intake 3746 ml   Output --   Net 3746 ml       Exam:  /61 (BP Location: Right arm, Patient Position: Lying)   Pulse 109   Temp 97.9 °F (36.6 °C) (Oral)   Resp 18   Ht 172.7 cm (68\")   Wt 67 kg (147 lb 11.3 oz)   SpO2 91%   BMI 22.46 kg/m²     General Appearance:    Alert, cooperative, clinically very frail but has demonstrated some clinical improvement over the last couple days, oriented x3, spouse at bedside                         Throat:   Oral mucosa pink and moist.  Core track in place                           Neck:   Supple, symmetrical, trachea midline, no JVD                         Lungs:    Diminished bases otherwise clear to auscultation bilaterally, respirations unlabored                    "       Heart:    Regular rate and rhythm, S1 and S2 normal                  Abdomen:     Soft, nontender, bowel sounds active                 Extremities: Severe generalized weakness, no pitting edema                        pulses:   Pulses palpable in all extremities                                 Data Review:  Labs in chart were reviewed.    Assessment:  Active Hospital Problems    Diagnosis  POA    **COVID-19 [U07.1]  Yes    Polycythemia [D75.1]  Yes    Chronic anticoagulation [Z79.01]  Not Applicable    PAD (peripheral artery disease) [I73.9]  Yes    Stenosis of carotid artery [I65.29]  Yes    S/P ablation of atrial flutter [Z98.890, Z86.79]  Not Applicable    S/P CABG (coronary artery bypass graft) [Z95.1]  Not Applicable    Coronary artery disease due to lipid rich plaque [I25.10, I25.83]  Yes    HLD (hyperlipidemia) [E78.5]  Yes    Benign essential hypertension [I10]  Yes    Cerebrovascular accident [I63.9]  Yes      Resolved Hospital Problems   No resolved problems to display.       Plan:    COVID-pneumonia with secondary bacterial component and resolving sepsis              -Decadron D7 -discontinue              -Cefepime D5 -completed              -Elevated LFTs due to infection/reactive -remdesivir discontinued -LFTs trending down               -O2 requirements continue to improve -NC at 3 L-1L-RA              -Resumed Lasix 10/29/2023      Severe dysphagia with normal diet prior to admission              -Tolerating core track and tube feeds.  -Speech is following and will await when they feel safe for video swallow  -If patient does not pass at that time, may need to consider more formal PEG tube placement  -Defer phosphorus replacement to nutrition              -Appreciate ENT consultation and input -no plans for further scope or investigation as hopefully will improve with time and speech therapy   -Counseled spouse to not give ice chips or sips of water as she is doing.  Patient's white blood cell  count jumped overnight though he is overall afebrile and I counseled that we do not want to cause an aspiration pneumonia.  We will repeat CBC in a.m. and if remains elevated then would suggest chest x-ray or CT   -c/o more heartburn - change IV PPI to liquid suspension and trial bid carafate        A-fib with past history of ablation, CAD/CABG, HTN              -RC-AC with Eliquis              -HTN stable and less labile with resumption of Lasix     Past history left MCA CVA/HLD     Past history of chronic pain on Norco and Elavil     Even though hopeful for SNF, overall prognosis is quite guarded. He has looked better over last couple days but today he seems more fatigued. This patient does not have room for clinical decline. If persists then need to consider GOC/palliative consult. Code status currently full as he was previously considering a more permanent feeding tube. Holding for right now.  Tube necessity/surgical reconsultation will be a limiting dispo factor if op dysphagia doesn't improve.          Disposition -patient is likely going to remain in house at least another week.  Goal is to potentially get him off of tube feeds if his swallow improves otherwise we will have to have surgical consultation for PEG tube placement     Everardo Sumner MD  10/31/2023  11:04 EDT

## 2023-11-01 ENCOUNTER — APPOINTMENT (OUTPATIENT)
Dept: GENERAL RADIOLOGY | Facility: HOSPITAL | Age: 78
End: 2023-11-01
Payer: MEDICARE

## 2023-11-01 LAB
ALBUMIN SERPL-MCNC: 2.8 G/DL (ref 3.5–5.2)
ALBUMIN/GLOB SERPL: 0.9 G/DL
ALP SERPL-CCNC: 123 U/L (ref 39–117)
ALT SERPL W P-5'-P-CCNC: 46 U/L (ref 1–41)
ANION GAP SERPL CALCULATED.3IONS-SCNC: 8.1 MMOL/L (ref 5–15)
AST SERPL-CCNC: 36 U/L (ref 1–40)
BILIRUB SERPL-MCNC: 0.6 MG/DL (ref 0–1.2)
BUN SERPL-MCNC: 31 MG/DL (ref 8–23)
BUN/CREAT SERPL: 37.3 (ref 7–25)
CALCIUM SPEC-SCNC: 8.6 MG/DL (ref 8.6–10.5)
CHLORIDE SERPL-SCNC: 102 MMOL/L (ref 98–107)
CO2 SERPL-SCNC: 26.9 MMOL/L (ref 22–29)
COPPER SERPL-MCNC: 102 UG/DL (ref 69–132)
CREAT SERPL-MCNC: 0.83 MG/DL (ref 0.76–1.27)
DEPRECATED RDW RBC AUTO: 53 FL (ref 37–54)
EGFRCR SERPLBLD CKD-EPI 2021: 89.6 ML/MIN/1.73
ERYTHROCYTE [DISTWIDTH] IN BLOOD BY AUTOMATED COUNT: 12.8 % (ref 12.3–15.4)
GLOBULIN UR ELPH-MCNC: 3.1 GM/DL
GLUCOSE BLDC GLUCOMTR-MCNC: 138 MG/DL (ref 70–130)
GLUCOSE BLDC GLUCOMTR-MCNC: 171 MG/DL (ref 70–130)
GLUCOSE BLDC GLUCOMTR-MCNC: 270 MG/DL (ref 70–130)
GLUCOSE SERPL-MCNC: 183 MG/DL (ref 65–99)
HCT VFR BLD AUTO: 35.5 % (ref 37.5–51)
HGB BLD-MCNC: 11.9 G/DL (ref 13–17.7)
MCH RBC QN AUTO: 37.8 PG (ref 26.6–33)
MCHC RBC AUTO-ENTMCNC: 33.5 G/DL (ref 31.5–35.7)
MCV RBC AUTO: 112.7 FL (ref 79–97)
PLATELET # BLD AUTO: 143 10*3/MM3 (ref 140–450)
PMV BLD AUTO: 12.7 FL (ref 6–12)
POTASSIUM SERPL-SCNC: 4.1 MMOL/L (ref 3.5–5.2)
PROT SERPL-MCNC: 5.9 G/DL (ref 6–8.5)
RBC # BLD AUTO: 3.15 10*6/MM3 (ref 4.14–5.8)
SODIUM SERPL-SCNC: 137 MMOL/L (ref 136–145)
WBC NRBC COR # BLD: 17.36 10*3/MM3 (ref 3.4–10.8)

## 2023-11-01 PROCEDURE — 80053 COMPREHEN METABOLIC PANEL: CPT | Performed by: HOSPITALIST

## 2023-11-01 PROCEDURE — 97530 THERAPEUTIC ACTIVITIES: CPT

## 2023-11-01 PROCEDURE — 63710000001 DEXAMETHASONE PER 0.25 MG: Performed by: HOSPITALIST

## 2023-11-01 PROCEDURE — 85027 COMPLETE CBC AUTOMATED: CPT | Performed by: HOSPITALIST

## 2023-11-01 PROCEDURE — 63710000001 INSULIN REGULAR HUMAN PER 5 UNITS: Performed by: HOSPITALIST

## 2023-11-01 PROCEDURE — 97110 THERAPEUTIC EXERCISES: CPT

## 2023-11-01 PROCEDURE — 82948 REAGENT STRIP/BLOOD GLUCOSE: CPT

## 2023-11-01 PROCEDURE — 71045 X-RAY EXAM CHEST 1 VIEW: CPT

## 2023-11-01 PROCEDURE — 25010000002 ONDANSETRON PER 1 MG: Performed by: INTERNAL MEDICINE

## 2023-11-01 PROCEDURE — 92526 ORAL FUNCTION THERAPY: CPT | Performed by: SPEECH-LANGUAGE PATHOLOGIST

## 2023-11-01 RX ADMIN — HYDROCODONE BITARTRATE AND ACETAMINOPHEN 1 TABLET: 10; 325 TABLET ORAL at 21:33

## 2023-11-01 RX ADMIN — APIXABAN 5 MG: 5 TABLET, FILM COATED ORAL at 20:38

## 2023-11-01 RX ADMIN — SUCRALFATE 1 G: 1 TABLET ORAL at 18:27

## 2023-11-01 RX ADMIN — HYDROCODONE BITARTRATE AND HOMATROPINE METHYLBROMIDE ORAL SOLUTION 5 ML: 5; 1.5 LIQUID ORAL at 18:28

## 2023-11-01 RX ADMIN — INSULIN HUMAN 6 UNITS: 100 INJECTION, SOLUTION PARENTERAL at 18:27

## 2023-11-01 RX ADMIN — APIXABAN 5 MG: 5 TABLET, FILM COATED ORAL at 08:59

## 2023-11-01 RX ADMIN — SUCRALFATE 1 G: 1 TABLET ORAL at 06:45

## 2023-11-01 RX ADMIN — FUROSEMIDE 20 MG: 20 TABLET ORAL at 12:56

## 2023-11-01 RX ADMIN — HYDROCODONE BITARTRATE AND ACETAMINOPHEN 1 TABLET: 10; 325 TABLET ORAL at 12:55

## 2023-11-01 RX ADMIN — DEXAMETHASONE 6 MG: 4 TABLET ORAL at 12:55

## 2023-11-01 RX ADMIN — AMLODIPINE BESYLATE 2.5 MG: 5 TABLET ORAL at 12:56

## 2023-11-01 RX ADMIN — LANSOPRAZOLE 30 MG: 15 TABLET, ORALLY DISINTEGRATING ORAL at 06:45

## 2023-11-01 RX ADMIN — AMITRIPTYLINE HYDROCHLORIDE 50 MG: 50 TABLET, FILM COATED ORAL at 20:38

## 2023-11-01 RX ADMIN — INSULIN HUMAN 2 UNITS: 100 INJECTION, SOLUTION PARENTERAL at 08:58

## 2023-11-01 RX ADMIN — Medication 1 APPLICATION: at 18:28

## 2023-11-01 RX ADMIN — ONDANSETRON 4 MG: 2 INJECTION INTRAMUSCULAR; INTRAVENOUS at 21:32

## 2023-11-01 RX ADMIN — Medication 1 APPLICATION: at 12:59

## 2023-11-01 RX ADMIN — Medication 1 APPLICATION: at 09:02

## 2023-11-01 RX ADMIN — Medication 1 APPLICATION: at 20:39

## 2023-11-01 NOTE — PLAN OF CARE
Goal Outcome Evaluation:  Plan of Care Reviewed With: patient        Progress: improving  Outcome Evaluation: Pt tolerated treatment fair this date. Required mod-max A for bed mobility, then SBA to maintain sitting balance on EOB. Limited overall d/t fatigue, and pt also c/o pain in low back and bottom. Encouraged pt to attempt a few bed exercises on own during the day.      Anticipated Discharge Disposition (PT): skilled nursing facility

## 2023-11-01 NOTE — PLAN OF CARE
Goal Outcome Evaluation:      VSS, 1L NC, tube feeds continued, no complaints of pain. Assist with turns. Skin at coccyx continues to improve. LHA notified of signs of dehydration.

## 2023-11-01 NOTE — THERAPY TREATMENT NOTE
Patient Name: Madhu Santoro  : 1945    MRN: [id number]                              Today's Date: 2023       Admit Date: 10/23/2023    Visit Dx:     ICD-10-CM ICD-9-CM   1. COVID-19  U07.1 079.89   2. Generalized weakness  R53.1 780.79   3. Fall at home, initial encounter  W19.XXXA E888.9    Y92.009 E849.0   4. Abrasion of left ear, initial encounter  S00.412A 910.0   5. Elevated troponin  R79.89 790.6     Patient Active Problem List   Diagnosis    Anxiety    Arthritis    Coronary artery disease due to lipid rich plaque    HLD (hyperlipidemia)    Benign essential hypertension    DDD (degenerative disc disease), lumbosacral    Cerebrovascular accident    Encounter for screening for cardiovascular disorders    Gastroesophageal reflux disease    Hyperlipidemia    Stenosis of carotid artery    Former smoker    History of cardiac catheterization    S/P ablation of atrial flutter    Typical atrial flutter    S/P CABG (coronary artery bypass graft)    Adenomatous polyp of ascending colon    Abdominal bloating    Stroke    PAD (peripheral artery disease)    Acute cholecystitis    Right upper quadrant abdominal pain    Chronic pain of both hips    Chronic bilateral low back pain without sciatica    Sacroiliac joint dysfunction of both sides    Encounter for long-term (current) use of high-risk medication    Lumbar facet arthropathy    Stroke-like symptoms    CVA (cerebral vascular accident)    Personal history of colonic polyps    Arthralgia of multiple joints    Iron deficiency    Thrombocytosis    Left ureteral stone    Obstructive uropathy    Chronic anticoagulation    Facial skin lesion    Iron deficiency anemia    Polycythemia    Neuropathy    Weakness    Pneumonia    Close exposure to COVID-19 virus    Polycythemia vera    Chronic gout without tophus    COVID-19     Past Medical History:   Diagnosis Date    Anxiety     Arthritis     Atrial flutter     Status post cavotricuspid isthmus ablation by   "[facility] on 4/18/17    Mandel esophagus     Benign essential hypertension     CAD (coronary artery disease)     3 vessel CABG 4/11/17 by Dr. Cortez: ROSARIO-prox LAD, SVG-OM1, SVG-OM3    Carotid artery disease     Status post carotid endarterectomy - USG 4/10/17: 50-59% NICHELLE, 1-15% LICA.     Colonic polyp     Cyst of pancreas     DDD (degenerative disc disease), lumbosacral     GERD (gastroesophageal reflux disease)     H/O bone density study 2013    H/O complete eye exam 2014    History of kidney stone     HLD (hyperlipidemia)     Hypertension     Kidney stone     8/22/22    Lipid screening 05/31/2013    Low back pain     physical therapy [facility] 5-12-10    Screening for prostate cancer 07/07/2015    Skin cancer     nose    Stroke     RESIDUAL--\"BALANCE ISSUES\"     Past Surgical History:   Procedure Laterality Date    CARDIAC CATHETERIZATION N/A 04/10/2017    Procedure: Left Heart Cath;  Surgeon: Marjorie Healy MD;  Location:  [facility] CATH INVASIVE [facility]ATION;  Service:     CARDIAC CATHETERIZATION N/A 04/10/2017    Procedure: Coronary angiography;  Surgeon: Marjorie Healy MD;  Location:  [facility];  Service:     CARDIAC CATHETERIZATION N/A 04/10/2017    Procedure: Left ventriculography;  Surgeon: Marjorie Healy MD;  [facility];  Service:     CARDIAC CATHETERIZATION  2011    CARDIAC ELECTROPHYSIOLOGY PROCEDURE N/A 04/18/2017    Procedure: Ablation atrial flutter;  Surgeon: Jose Antonio Gustafson MD;  Location:  [facility] CATH INVASIVE LOCATION;  Service:     CAROTID ENDARTERECTOMY      CHOLECYSTECTOMY      CHOLECYSTECTOMY WITH INTRAOPERATIVE CHOLANGIOGRAM N/A 09/07/2019    Procedure: Laparoscopic cholecystectomy with intraoperative cholangiogram;  Surgeon: Gauri Travis MD;  Location: [facility] MAIN OR;  Service: General    COLONOSCOPY  01/06/2015    Diverticulosis, one TA    COLONOSCOPY N/A 02/14/2019    tics, NBIH, adenomatous polyp x 2    COLONOSCOPY N/A 11/05/2021    " Procedure: COLONOSCOPY TO CECUM AND TERM. ILEUM WITH COLD POLYPECTOMIES;  Surgeon: Everton Abel MD;  Location: [facility] ENDOSCOPY;  Service: Gastroenterology;  Laterality: N/A;  PRE OP - PERS H/O POLYPS  POST OP - COLON POLYPS,, DIVERTICULOSIS, HEMORRHOIDS    CORONARY ARTERY BYPASS GRAFT N/A 04/11/2017    Procedure: AR STERNOTOMY CORONARY ARTERY BYPASS GRAFT TIMES 3 USING LEFT INTERNAL MAMMARY ARTERY AND LEFT GREATER SAPHENOUS VEIN GRAFT PER ENDOSCOPIC VEIN HARVESTING AND PRP ;  Surgeon: Temo Cortez MD;  Location: [facility] MAIN OR;  Service:     ENDOSCOPY  01/06/2015    HH, Ervin's esophagus    ENDOSCOPY N/A 02/14/2019    Z line irregular, HH, Ervin's esophagus    ENDOSCOPY N/A 11/05/2021    Procedure: ESOPHAGOGASTRODUODENOSCOPY WITH BIOPSIES;  Surgeon: Everton Abel MD;  Location: [facility] ENDOSCOPY;  Service: Gastroenterology;  Laterality: N/A;  PRE OP - PERS H/O ERVIN'S  POST OP - IRREG Z LINE    KNEE SURGERY Left     URETEROSCOPY LASER LITHOTRIPSY WITH STENT INSERTION Left 8/23/2022    Procedure: Cysto retrograde with left uretro stent placement;  Surgeon: Damien Oliveira MD;  Location: [facility] MAIN OR;  Service: Urology;  Laterality: Left;    VASECTOMY        General Information       Row Name 11/01/23 1440          Physical Therapy Time and Intention    Document Type therapy note (daily note)  -     Mode of Treatment physical therapy  -       Row Name 11/01/23 1440          General Information    Existing Precautions/Restrictions fall;oxygen therapy device and L/min  -       Row Name 11/01/23 1440          Cognition    Orientation Status (Cognition) oriented x 3  -               User Key  (r) = Recorded By, (t) = Taken By, (c) = Cosigned By      Initials Name Provider Type     Trang Aburto PTA Physical Therapist Assistant                   Mobility       Row Name 11/01/23 1445          Bed Mobility    Bed Mobility supine-sit;sit-supine  -     Supine-Sit [place] (Bed  Mobility) moderate assist (50% patient effort)  -     Sit-Supine [place] (Bed Mobility) maximum assist (25% patient effort)  -     Assistive Device (Bed Mobility) bed rails;head of bed elevated  -               User Key  (r) = Recorded By, (t) = Taken By, (c) = Cosigned By      Initials Name Provider Type    Trang Lagunas PTA Physical Therapist Assistant                   Obj/Interventions       Row Name 11/01/23 1449          Motor Skills    Therapeutic Exercise --  seated AP and LAQ x10 reps  -               User Key  (r) = Recorded By, (t) = Taken By, (c) = Cosigned By      Initials Name Provider Type    Trang Lagunas PTA Physical Therapist Assistant                   Goals/Plan    No documentation.                  Clinical Impression       Row Name 11/01/23 1451          Pain    Pretreatment Pain Rating 3/10  -     Posttreatment Pain Rating 6/10  -     Pain Location - back;buttock  -     Pain Intervention(s) Repositioned;Ambulation/increased activity;Rest  -       Row Name 11/01/23 1451          Positioning and Restraints    Pre-Treatment Position in bed  -     Post Treatment Position bed  -SM     In Bed supine;call light within reach;encouraged to call for assist;exit alarm on  -               User Key  (r) = Recorded By, (t) = Taken By, (c) = Cosigned By      Initials Name Provider Type    Trang Lagunas PTA Physical Therapist Assistant                   Outcome Measures       Row Name 11/01/23 1451 11/01/23 0855       How much help from another person do you currently need...    Turning from your back to your side while in flat bed without using bedrails? 2  -SM 1  -KP    Moving from lying on back to sitting on the side of a flat bed without bedrails? 2  -SM 1  -KP    Moving to and from a bed to a chair (including a wheelchair)? 1  -SM 1  -KP    Standing up from a chair using your arms (e.g., wheelchair, bedside chair)? 1  -SM 1  -KP    Climbing 3-5 steps  with a railing? 1  -SM 1  -KP    To walk in hospital room? 1  -SM 1  -KP    AM-PAC 6 Clicks Score (PT) 8  -SM 6  -KP    Highest level of mobility 3 --> Sat at edge of bed  -SM 2 --> Bed activities/dependent transfer  -KP      Row Name 11/01/23 0438          How much help from another person do you currently need...    Turning from your back to your side while in flat bed without using bedrails? 2  -EK     Moving from lying on back to sitting on the side of a flat bed without bedrails? 2  -EK     Moving to and from a bed to a chair (including a wheelchair)? 1  -EK     Standing up from a chair using your arms (e.g., wheelchair, bedside chair)? 1  -EK     Climbing 3-5 steps with a railing? 1  -EK     To walk in hospital room? 1  -EK     AM-PAC 6 Clicks Score (PT) 8  -EK     Highest level of mobility 3 --> Sat at edge of bed  -EK       Row Name 11/01/23 1451          Functional Assessment    Outcome Measure Options AM-PAC 6 Clicks Basic Mobility (PT)  -SM               User Key  (r) = Recorded By, (t) = Taken By, (c) = Cosigned By      Initials Name Provider Type    Angie Crane, RN Registered Nurse    Trang Lagunas PTA Physical Therapist Assistant    Tawana Toscano RN Registered Nurse                                 Physical Therapy Education       Title: PT OT SLP Therapies (Done)       Topic: Physical Therapy (Done)       Point: Mobility training (Done)       Learning Progress Summary             Patient Acceptance, E,TB,D, VU,NR by  at 11/1/2023 1452    Acceptance, E, VU by [facility] at 10/30/2023 1139    Acceptance, E,D, DU by  at 10/26/2023 1649    Acceptance, E,TB,D, VU,NR by  at 10/24/2023 1535                         Point: Home exercise program (Done)       Learning Progress Summary             Patient Acceptance, E,TB,D, VU,NR by  at 11/1/2023 1452    Acceptance, E, VU by [facility] at 10/30/2023 1139    Acceptance, E,D, DU by  at 10/26/2023 1649    Acceptance, E,TB,D, VU,NR by  at  10/24/2023 1535                         Point: Body mechanics (Done)       Learning Progress Summary             Patient Acceptance, E,TB,D, VU,NR by  at 11/1/2023 1452    Acceptance, E, VU by [facility] at 10/30/2023 1139    Acceptance, E,D, DU by  at 10/26/2023 1649    Acceptance, E,TB,D, VU,NR by  at 10/24/2023 1535                         Point: Precautions (Done)       Learning Progress Summary             Patient Acceptance, E,TB,D, VU,NR by  at 11/1/2023 1452    Acceptance, E, VU by 1 at 10/30/2023 1139    Acceptance, E,D, DU by PC at 10/26/2023 1649    Acceptance, E,TB,D, VU,NR by  at 10/24/2023 1535                                         User Key       Initials Effective Dates Name Provider Type Discipline     06/16/21 -  Blanca Elkins, PT Physical Therapist PT     06/16/21 -  Naomi Tucker, PT Physical Therapist PT     03/07/18 -  Trang Aburto PTA Physical Therapist Assistant PT    [facility] 05/02/22 -  Larisa Brito, PT Physical Therapist PT                  PT Recommendation and Plan     Plan of Care Reviewed With: patient  Progress: improving  Outcome Evaluation: Pt tolerated treatment fair this date. Required mod-max A for bed mobility, then SBA to maintain sitting balance on EOB. Limited overall d/t fatigue, and pt also c/o pain in low back and bottom. Encouraged pt to attempt a few bed exercises on own during the day.     Time Calculation:         PT Charges       Row Name 11/01/23 1455             Time Calculation    Start Time 1335  -      Stop Time 1358  -      Time Calculation (min) 23 min  -      PT Received On 11/01/23  -      PT - Next Appointment 11/03/23  -                User Key  (r) = Recorded By, (t) = Taken By, (c) = Cosigned By      Initials Name Provider Type     Trang Aburto, FREDY Physical Therapist Assistant                  Therapy Charges for Today       Code Description Service Date Service Provider Modifiers Qty    19069355054  PT  THERAPEUTIC ACT EA 15 MIN 11/1/2023 Trang Aburto, PTA GP 1    [id number] HC PT THER PROC EA 15 MIN 11/1/2023 Trang Aburto, FREDY GP 1            PT G-Codes  Outcome Measure Options: AM-PAC 6 Clicks Basic Mobility (PT)  AM-PAC 6 Clicks Score (PT): 8  AM-PAC 6 Clicks Score (OT): 9  PT Discharge Summary  Anticipated Discharge Disposition (PT): skilled nursing facility    Trang Aburto PTA  11/1/2023

## 2023-11-01 NOTE — PLAN OF CARE
Goal Outcome Evaluation:  Plan of Care Reviewed With: patient           Outcome Evaluation: Swallow re-evaulated bedside. Pt continues to demonstrate weak voice with intermittent wet quality baseline. Throat clear/cough weak with ability to clear only small amounts into mouth. Presented with ice, thin, nectar, honey, puree by spoon. Pt demonstrated cough following puree consistency and increased wetness of vocal quality following thin liquid. No overt s/s aspiration with nectar, honey thick liquids. Recommend instrumental eval (VFSS, FEES) to determine safety of po for diet or trials.Continue NPO w/ cortrak for nutrition and meds with ice chips w/ oral care until instrumental swallow performed.

## 2023-11-01 NOTE — PROGRESS NOTES
Name: Madhu Santoro ADMIT: 10/23/2023   : 1945  PCP: Damian Sanchez MD    MRN: [id number] LOS: 7 days   AGE/SEX: 77 y.o. male  ROOM: [facility]     Subjective   Subjective   Looks generally weak. Difficult to understand.      Objective   Objective   Vital Signs  Temp:  [97.5 °F (36.4 °C)-98.8 °F (37.1 °C)] 98.1 °F (36.7 °C)  Heart Rate:  [] 87  Resp:  [18] 18  BP: (125-147)/(52-75) 125/55  SpO2:  [91 %-95 %] 95 %  on  Flow (L/min):  [1-2] 2;   Device (Oxygen Therapy): nasal cannula  Body mass index is 23.06 kg/m².    Physical Exam  Constitutional:       General: He is not in acute distress.     Appearance: He is ill-appearing. He is not toxic-appearing.   HENT:      Head: Normocephalic and atraumatic.      Nose:      Comments: NGT  Eyes:      Extraocular Movements: Extraocular movements intact.   Cardiovascular:      Rate and Rhythm: Normal rate and regular rhythm.   Pulmonary:      Effort: Pulmonary effort is normal. No respiratory distress.      Breath sounds: Normal breath sounds. No wheezing.   Abdominal:      General: Bowel sounds are normal.      Palpations: Abdomen is soft.      Tenderness: There is no abdominal tenderness. There is no guarding or rebound.   Musculoskeletal:         General: No swelling.      Cervical back: Normal range of motion.      Right lower leg: No edema.      Left lower leg: No edema.   Skin:     General: Skin is warm and dry.       Results Review  I reviewed the patient's new clinical results.  Results from last 7 days   Lab Units 10/31/23  0541 10/27/23  0540 10/26/23  0229   WBC 10*3/mm3 19.35* 10.54 16.18*   HEMOGLOBIN g/dL 13.6 10.6* 12.2*   PLATELETS 10*3/mm3 182 156 145     Results from last 7 days   Lab Units 10/31/23  0541 10/30/23  0648 10/27/23  0539 10/26/23  0229   SODIUM mmol/L 132*  --  142 138   POTASSIUM mmol/L 4.6 4.1 4.5 4.6   CHLORIDE mmol/L 101  --  114* 108*   CO2 mmol/L 21.0*  --  21.0* 18.6*   BUN mg/dL 29*  --  34* 40*   CREATININE mg/dL 0.84   --  0.75* 0.92   GLUCOSE mg/dL 184*  --  120* 136*     Lab Results   Component Value Date    ANIONGAP 10.0 10/31/2023     Estimated Creatinine Clearance: 70.5 mL/min (by C-G formula based on SCr of 0.84 mg/dL).   Lab Results   Component Value Date    EGFR 89.3 10/31/2023     Results from last 7 days   Lab Units 10/31/23  0541 10/27/23  0539 10/26/23  0229   ALBUMIN g/dL 2.9* 2.7* 3.0*   BILIRUBIN mg/dL 0.6 0.5 0.6   ALK PHOS U/L 147* 154* 217*   AST (SGOT) U/L 52* 189* 391*   ALT (SGPT) U/L 69* 134* 210*     Results from last 7 days   Lab Units 10/31/23  0541 10/30/23  0648 10/28/23  0706 10/27/23  0539 10/26/23  0229   CALCIUM mg/dL 8.6  --   --  8.2* 7.9*   ALBUMIN g/dL 2.9*  --   --  2.7* 3.0*   MAGNESIUM mg/dL  --  2.2  --  3.1* 2.0   PHOSPHORUS mg/dL  --  2.3* 2.0* 2.2* 2.6     Results from last 7 days   Lab Units 10/27/23  0540 10/27/23  0004 10/26/23  1738 10/25/23  1456   LACTATE mmol/L 1.2 2.4* 2.0 1.9     Glucose   Date/Time Value Ref Range Status   11/01/2023 0631 171 (H) 70 - 130 mg/dL Final   10/31/2023 2345 229 (H) 70 - 130 mg/dL Final   10/31/2023 1613 338 (H) 70 - 130 mg/dL Final   10/31/2023 1114 178 (H) 70 - 130 mg/dL Final   10/31/2023 0626 158 (H) 70 - 130 mg/dL Final   10/30/2023 2146 219 (H) 70 - 130 mg/dL Final   10/30/2023 1633 236 (H) 70 - 130 mg/dL Final       No radiology results for the last day    Scheduled Meds  [Held by provider] allopurinol, 300 mg, Oral, Daily  amitriptyline, 50 mg, Oral, Nightly  amLODIPine, 2.5 mg, Oral, Daily  apixaban, 5 mg, Oral, BID  furosemide, 20 mg, Oral, Daily  insulin regular, 2-9 Units, Subcutaneous, Q6H  lansoprazole, 30 mg, Nasogastric, QAM AC  Menthol-Zinc Oxide, 1 application , Topical, 4x Daily  oxybutynin XL, 5 mg, Oral, Daily  sucralfate, 1 g, Oral, BID AC    Continuous Infusions   PRN Meds    acetaminophen **OR** acetaminophen **OR** acetaminophen    acetaminophen **OR** acetaminophen    benzocaine-menthol    senna-docusate sodium **AND**  polyethylene glycol **AND** bisacodyl **AND** bisacodyl    dextrose    dextrose    glucagon (human recombinant)    guaifenesin    HYDROcodone Bit-Homatrop MBr    HYDROcodone-acetaminophen    nitroglycerin    ondansetron **OR** ondansetron    phenol    [COMPLETED] Insert Peripheral IV **AND** sodium chloride    sodium chloride     Diet  NPO Diet NPO Type: Strict NPO    I have personally reviewed:  [x]  Medications  []  Laboratory   []  Microbiology   []  Radiology   []  EKG/Telemetry   []  Cardiology/Vascular   []  Pathology   []  Records      Assessment/Plan     Active Hospital Problems    Diagnosis  POA    **COVID-19 [U07.1]  Yes    Polycythemia [D75.1]  Yes    Chronic anticoagulation [Z79.01]  Not Applicable    PAD (peripheral artery disease) [I73.9]  Yes    Stenosis of carotid artery [I65.29]  Yes    S/P ablation of atrial flutter [Z98.890, Z86.79]  Not Applicable    S/P CABG (coronary artery bypass graft) [Z95.1]  Not Applicable    Coronary artery disease due to lipid rich plaque [I25.10, I25.83]  Yes    HLD (hyperlipidemia) [E78.5]  Yes    Benign essential hypertension [I10]  Yes    Cerebrovascular accident [I63.9]  Yes      Resolved Hospital Problems   No resolved problems to display.     77 y.o. male former smoker with a history of HTN, a flutter, CVA, HLD, CAD w/ h/o CABG, polycythemia vera, gout, chronic pain/opioid dependence that presents to [facility] complaining of generalized weakness and mechanical fall.      COVID-pneumonia  -Completed antibiotics (cefepime 5 days)  -dexamethasone (to PO) 10 days or until dc  -Monitoring LFTs  -Some leukocytosis probably from steroid  -Flow (L/min):  [1-2] 2   -Continue supportive care  -Deconditioning d/t above    Dysphagia  -On tube feeds  -Reportedly was given water per wife  -Follow-up CBC is pending will check chest x-ray  -SLP following may need PEG    Atrial fibrillation history of ablation  CAD/CABG  Hypertension  -Apixaban for  anticoagulation  -BP acceptable    History of left MCA CVA  Hyperlipidemia  Chronic pain  Polycythemia vera  Gout    Discussed with patient, nursing staff, and CCP    Expected Discharge Date: 11/7/2023; Expected Discharge Time:     SNF planning    [facility] Moris Lawrence MD  [facility]  11/01/23

## 2023-11-01 NOTE — THERAPY RE-EVALUATION
Acute Care - Speech Language Pathology   Swallow Re-Evaluation [facility]     Patient Name: Madhu Santoro  : 1945  MRN: [id number]  Today's Date: 2023               Admit Date: 10/23/2023    Visit Dx:     ICD-10-CM ICD-9-CM   1. COVID-19  U07.1 079.89   2. Generalized weakness  R53.1 780.79   3. Fall at home, initial encounter  W19.XXXA E888.9    Y92.009 E849.0   4. Abrasion of left ear, initial encounter  S00.412A 910.0   5. Elevated troponin  R79.89 790.6     Patient Active Problem List   Diagnosis    Anxiety    Arthritis    Coronary artery disease due to lipid rich plaque    HLD (hyperlipidemia)    Benign essential hypertension    DDD (degenerative disc disease), lumbosacral    Cerebrovascular accident    Encounter for screening for cardiovascular disorders    Gastroesophageal reflux disease    Hyperlipidemia    Stenosis of carotid artery    Former smoker    History of cardiac catheterization    S/P ablation of atrial flutter    Typical atrial flutter    S/P CABG (coronary artery bypass graft)    Adenomatous polyp of ascending colon    Abdominal bloating    Stroke    PAD (peripheral artery disease)    Acute cholecystitis    Right upper quadrant abdominal pain    Chronic pain of both hips    Chronic bilateral low back pain without sciatica    Sacroiliac joint dysfunction of both sides    Encounter for long-term (current) use of high-risk medication    Lumbar facet arthropathy    Stroke-like symptoms    CVA (cerebral vascular accident)    Personal history of colonic polyps    Arthralgia of multiple joints    Iron deficiency    Thrombocytosis    Left ureteral stone    Obstructive uropathy    Chronic anticoagulation    Facial skin lesion    Iron deficiency anemia    Polycythemia    Neuropathy    Weakness    Pneumonia    Close exposure to COVID-19 virus    Polycythemia vera    Chronic gout without tophus    COVID-19     Past Medical History:   Diagnosis Date    Anxiety     Arthritis     Atrial  "flutter     Status post cavotricuspid isthmus ablation by Dr. Gustafson on 4/18/17    Mandel esophagus     Benign essential hypertension     CAD (coronary artery disease)     3 vessel CABG 4/11/17 by Dr. Cortez: ROSARIO-prox LAD, SVG-OM1, SVG-OM3    Carotid artery disease     Status post carotid endarterectomy - USG 4/10/17: 50-59% NICHELLE, 1-15% LICA.     Colonic polyp     Cyst of pancreas     DDD (degenerative disc disease), lumbosacral     GERD (gastroesophageal reflux disease)     H/O bone density study 2013    H/O complete eye exam 2014    History of kidney stone     HLD (hyperlipidemia)     Hypertension     Kidney stone     8/22/22    Lipid screening 05/31/2013    Low back pain     physical therapy [facility] 5-12-10    Screening for prostate cancer 07/07/2015    Skin cancer     nose    Stroke     RESIDUAL--\"BALANCE ISSUES\"     Past Surgical History:   Procedure Laterality Date    CARDIAC CATHETERIZATION N/A 04/10/2017    Procedure: Left Heart Cath;  Surgeon: Marjorie Healy MD;  [facility];  Service:     CARDIAC CATHETERIZATION N/A 04/10/2017    Procedure: Coronary angiography;  Surgeon: Marjorie Healy MD;  [facility];  Service:     CARDIAC CATHETERIZATION N/A 04/10/2017    Procedure: Left ventriculography;  Surgeon: Marjorie Healy MD;  [facility];  Service:     CARDIAC CATHETERIZATION  2011    CARDIAC ELECTROPHYSIOLOGY PROCEDURE N/A 04/18/2017    Procedure: Ablation atrial flutter;  Surgeon: Jose Antonio Gustafson MD;  [facility];  Service:     CAROTID ENDARTERECTOMY      CHOLECYSTECTOMY      CHOLECYSTECTOMY WITH INTRAOPERATIVE CHOLANGIOGRAM N/A 09/07/2019    Procedure: Laparoscopic cholecystectomy with intraoperative cholangiogram;  Surgeon: Gauri Travis MD;  Location: [facility] MAIN OR;  Service: General    COLONOSCOPY  01/06/2015    Diverticulosis, one TA    COLONOSCOPY N/A 02/14/2019    tics, " NBIH, adenomatous polyp x 2    COLONOSCOPY N/A 11/05/2021    Procedure: COLONOSCOPY TO CECUM AND TERM. ILEUM WITH COLD POLYPECTOMIES;  Surgeon: Everton Abel MD;  Location:  [facility];  Service: Gastroenterology;  Laterality: N/A;  PRE OP - PERS H/O POLYPS  POST OP - COLON POLYPS,, DIVERTICULOSIS, HEMORRHOIDS    CORONARY ARTERY BYPASS GRAFT N/A 04/11/2017    Procedure: AR STERNOTOMY CORONARY ARTERY BYPASS GRAFT TIMES 3 USING LEFT INTERNAL MAMMARY ARTERY AND LEFT GREATER SAPHENOUS VEIN GRAFT PER ENDOSCOPIC VEIN HARVESTING AND PRP ;  Surgeon: Temo Cortez MD;  Location: [facility] MAIN OR;  Service:     ENDOSCOPY  01/06/2015    HH, Ervin's esophagus    ENDOSCOPY N/A 02/14/2019    Z line irregular, HH, Ervin's esophagus    ENDOSCOPY N/A 11/05/2021    Procedure: ESOPHAGOGASTRODUODENOSCOPY WITH BIOPSIES;  Surgeon: Everton Abel MD;  Location:  [facility];  Service: Gastroenterology;  Laterality: N/A;  PRE OP - PERS H/O ERVIN'S  POST OP - IRREG Z LINE    KNEE SURGERY Left     URETEROSCOPY LASER LITHOTRIPSY WITH STENT INSERTION Left 8/23/2022    Procedure: Cysto retrograde with left uretro stent placement;  Surgeon: Damien Oliveira MD;  Location:  [facility];  Service: Urology;  Laterality: Left;    VASECTOMY         SLP Recommendation and Plan                                                                                  Plan of Care Reviewed With: patient  Outcome Evaluation: Swallow re-evaulated bedside. Pt continues to demonstrate weak voice with intermittent wet quality baseline. Throat clear/cough weak with ability to clear only small amounts into mouth. Presented with ice, thin, nectar, honey, puree by spoon. Pt demonstrated cough following puree consistency and increased wetness of vocal quality following thin liquid. No overt s/s aspiration with nectar, honey thick liquids. Recommend instrumental eval (VFSS, FEES) to determine safety of po for diet or trials.Continue NPO w/  cortrak for nutrition and meds with ice chips w/ oral care until instrumental swallow performed.      SWALLOW EVALUATION (last 72 hours)       SLP Adult Swallow Evaluation       Row Name 11/01/23 1115 10/30/23 1000                Rehab Evaluation    Document Type therapy note (daily note)  -SA re-evaluation  -CR       Subjective Information -- no complaints  -CR       Patient Observations alert;cooperative  -SA alert;cooperative  -CR       Patient Effort good  -SA good  -CR       Symptoms Noted During/After Treatment none  -SA none  -CR          General Information    Patient Profile Reviewed yes  -SA yes  -CR       Current Method of Nutrition NPO;nasogastric feedings  -SA --       Plans/Goals Discussed with patient;agreed upon  -SA --       Barriers to Rehab medically complex  -SA --       Patient's Goals for Discharge return to PO diet  -SA --          Pain Scale: Numbers Pre/Post-Treatment    Pretreatment Pain Rating -- 0/10 - no pain  -CR       Posttreatment Pain Rating -- 0/10 - no pain  -CR          SLP Evaluation Clinical Impression    SLP Swallowing Diagnosis -- suspected pharyngeal dysphagia  -CR       Functional Impact -- risk of aspiration/pneumonia  -CR       Rehab Potential/Prognosis, Swallowing -- re-evaluate goals as necessary  -CR       Swallow Criteria for Skilled Therapeutic Interventions Met -- demonstrates skilled criteria  -CR          Recommendations    Therapy Frequency (Swallow) -- PRN  -CR       Predicted Duration Therapy Intervention (Days) -- until discharge  -CR       SLP Diet Recommendation -- NPO  -CR       Recommended Diagnostics -- reassess via clinical swallow evaluation  monitor readiness for VFSS  -CR       Oral Care Recommendations -- Oral Care BID/PRN  -CR       SLP Rec. for Method of Medication Administration -- meds via alternate route  -CR       Monitor for Signs of Aspiration -- notify SLP if any concerns  -CR          (LTG) Patient will demonstrate functional swallow for     Diet Texture (Demonstrate functional swallow) soft to chew (chopped) textures  -SA soft to chew (chopped) textures  -CR       Liquid viscosity (Demonstrate functional swallow) thin liquids  -SA thin liquids  -CR       [place] (Demonstrate functional swallow) independently (over 90% accuracy)  -SA independently (over 90% accuracy)  -CR       Time Frame (Demonstrate functional swallow) by discharge  -SA by discharge  -CR       Progress/Outcomes (Demonstrate functional swallow) progress slower than expected  -SA --       Comment (Demonstrate functional swallow) Swallow re-evaulated bedside.  Pt continues to demonstrate weak voice with intermittent wet quality baseline.  Throat clear/cough weak with ability to clear only small amounts into mouth.  Presented with ice, thin, nectar, honey, puree by spoon.  Pt demonstrated cough following puree consistency and increased wetness of vocal quality following thin liquid.  No overt s/s aspiration with nectar, honey thick liquids.  Recommend instrumental eval (VFSS, FEES) to determine safety of po for diet or trials.Continue NPO w/ cortrak for nutrition and meds with ice chips w/ oral care until instrumental swallow performed.  -SA Re-evaluation of swallow completed. Improvements in secretion management noted this date. Voice continues to be weak. Weak cough following ice chip, thin by spoon, nectar thick liquid by spoon, honey thick liquid by spoon/cup, and puree trials. Recommend continue NPO. Meds via alternate route. Ice chips for comfort with known aspiration risk. Speech to follow at bedside for re-eval and monitor readiness for VFSS.  -CR                 User Key  (r) = Recorded By, (t) = Taken By, (c) = Cosigned By      Initials Name Effective Dates    Jodie Brown MS CCC-SLP 07/11/23 -     Adry Lentz, SLP 08/28/23 -                     EDUCATION  The patient has been educated in the following areas:   Dysphagia (Swallowing Impairment) Oral  Care/Hydration NPO rationale.        SLP GOALS       Row Name 11/01/23 1115 10/30/23 1000          (LTG) Patient will demonstrate functional swallow for    Diet Texture (Demonstrate functional swallow) soft to chew (chopped) textures  -SA soft to chew (chopped) textures  -CR     Liquid viscosity (Demonstrate functional swallow) thin liquids  -SA thin liquids  -CR     [place] (Demonstrate functional swallow) independently (over 90% accuracy)  -SA independently (over 90% accuracy)  -CR     Time Frame (Demonstrate functional swallow) by discharge  -SA by discharge  -CR     Progress/Outcomes (Demonstrate functional swallow) progress slower than expected  -SA --     Comment (Demonstrate functional swallow) Swallow re-evaulated bedside.  Pt continues to demonstrate weak voice with intermittent wet quality baseline.  Throat clear/cough weak with ability to clear only small amounts into mouth.  Presented with ice, thin, nectar, honey, puree by spoon.  Pt demonstrated cough following puree consistency and increased wetness of vocal quality following thin liquid.  No overt s/s aspiration with nectar, honey thick liquids.  Recommend instrumental eval (VFSS, FEES) to determine safety of po for diet or trials.Continue NPO w/ cortrak for nutrition and meds with ice chips w/ oral care until instrumental swallow performed.  -SA Re-evaluation of swallow completed. Improvements in secretion management noted this date. Voice continues to be weak. Weak cough following ice chip, thin by spoon, nectar thick liquid by spoon, honey thick liquid by spoon/cup, and puree trials. Recommend continue NPO. Meds via alternate route. Ice chips for comfort with known aspiration risk. Speech to follow at bedside for re-eval and monitor readiness for VFSS.  -CR               User Key  (r) = Recorded By, (t) = Taken By, (c) = Cosigned By      Initials Name Provider Type    Jodie Brown MS CCC-SLP Speech and Language Pathologist    YOKASTA  Ratterman, Adry, SLP Speech and Language Pathologist                       Time Calculation:    Time Calculation- SLP       Row Name 11/01/23 1300             Time Calculation- SLP    SLP Start Time 1115  -SA      SLP Received On 11/01/23 -SA                User Key  (r) = Recorded By, (t) = Taken By, (c) = Cosigned By      Initials Name Provider Type    Jodie Brown MS CCC-SLP Speech and Language Pathologist                    Therapy Charges for Today       Code Description Service Date Service Provider Modifiers Qty    [id number]  ST TREATMENT SWALLOW 4 11/1/2023 Jodie Peacock MS CCC-GONZÁLEZ GN 1                 MS JYOTHI Farris  11/1/2023

## 2023-11-01 NOTE — PROGRESS NOTES
"  Daily Progress Note.   [address]  11/1/2023    Patient:  Name:  Madhu Santoro  MRN:  [id number]  1945  77 y.o.  male         CC: covid [facility]    Interval History:  Weak appearing  Asleep awakens   States his abd pain is better  No worsening soa.  Lights off room dark patient asleep at 245 pm.    Physical Exam:  /59 (BP Location: Left arm)   Pulse 96   Temp 97.3 °F (36.3 °C) (Oral)   Resp 28   Ht 172.7 cm (68\")   Wt 68.8 kg (151 lb 10.8 oz)   SpO2 97%   BMI 23.06 kg/m²   Body mass index is 23.06 kg/m².    Intake/Output Summary (Last 24 hours) at 11/1/2023 1455  Last data filed at 11/1/2023 0855  Gross per 24 hour   Intake 1727 ml   Output --   Net 1727 ml     General appearance: ill weak appearing, conversant   Eyes: anicteric sclerae, moist conjunctivae; no lidlag;    HENT: Atraumatic; oropharynx +cortrak  Lungs:  some but improved rhonchi no wheeze, with normal respiratory effort and no intercostal retractions  CV: RRR, no rub   Abdomen: Soft, non rigid; BS+  Skin: WWP no diffuse visible rash  Psych: calm affect, alert   Neuro: Moves all ext. Grossly CN II-XII. Speech soft difficult to understand    Data Review:  Notable Labs:  Results from last 7 days   Lab Units 11/01/23  1211 10/31/23  0541 10/27/23  0540 10/26/23  0229   WBC 10*3/mm3 17.36* 19.35* 10.54 16.18*   HEMOGLOBIN g/dL 11.9* 13.6 10.6* 12.2*   PLATELETS 10*3/mm3 143 182 156 145     Results from last 7 days   Lab Units 10/31/23  0541 10/30/23  0648 10/28/23  0706 10/27/23  0539 10/26/23  0229 10/25/23  1456   SODIUM mmol/L 132*  --   --  142 138  --    POTASSIUM mmol/L 4.6 4.1  --  4.5 4.6  --    CHLORIDE mmol/L 101  --   --  114* 108*  --    CO2 mmol/L 21.0*  --   --  21.0* 18.6*  --    BUN mg/dL 29*  --   --  34* 40*  --    CREATININE mg/dL 0.84  --   --  0.75* 0.92  --    GLUCOSE mg/dL 184*  --   --  120* 136*  --    CALCIUM mg/dL 8.6  --   --  8.2* 7.9*  --    MAGNESIUM mg/dL  --  2.2  --  3.1* 2.0  --  "   PHOSPHORUS mg/dL  --  2.3* 2.0* 2.2* 2.6 1.5*   Estimated Creatinine Clearance: 70.5 mL/min (by C-G formula based on SCr of 0.84 mg/dL).    Results from last 7 days   Lab Units 11/01/23  1211 10/31/23  0541 10/27/23  0540 10/27/23  0539 10/27/23  0004 10/26/23  1738 10/26/23  0229   AST (SGOT) U/L  --  52*  --  189*  --   --  391*   ALT (SGPT) U/L  --  69*  --  134*  --   --  210*   LACTATE mmol/L  --   --  1.2  --  2.4* 2.0  --    CRP mg/dL  --   --   --  6.92*  --   --   --    PLATELETS 10*3/mm3 143 182 156  --   --   --  145               Imaging:  Reviewed chest images personally from past 3 days    ASSESSMENT  /  PLAN:  COVID 19 pneumonia  Bacterial Pneumonia  Dysphagia  AFib    CAD s/p CABG  HTN  Chronic Pain  Small pleural effusion left    Completed course of cefepime  Remdesivir limited by transaminits - improving  Cont decadron for 10 days or hospital dc  Dysphagia plans noted.  Monitor for any fever or worsening abd pain.  Airway clr challenging with pt weakness overall  Lansoprazole.  Monitor wbc - cxr reviewed repeat tomorrow may need ct to evaluate effusion and wbc count will send procal in am.    Electronically signed by Albert Daly MD, 11/01/23, 2:57 PM EDT.

## 2023-11-02 ENCOUNTER — APPOINTMENT (OUTPATIENT)
Dept: CARDIOLOGY | Facility: HOSPITAL | Age: 78
End: 2023-11-02
Payer: MEDICARE

## 2023-11-02 ENCOUNTER — APPOINTMENT (OUTPATIENT)
Dept: GENERAL RADIOLOGY | Facility: HOSPITAL | Age: 78
End: 2023-11-02
Payer: MEDICARE

## 2023-11-02 LAB
ALBUMIN SERPL-MCNC: 2.5 G/DL (ref 3.5–5.2)
ALBUMIN/GLOB SERPL: 0.8 G/DL
ALP SERPL-CCNC: 112 U/L (ref 39–117)
ALT SERPL W P-5'-P-CCNC: 50 U/L (ref 1–41)
ANION GAP SERPL CALCULATED.3IONS-SCNC: 10 MMOL/L (ref 5–15)
AORTIC DIMENSIONLESS INDEX: 0.7 (DI)
ASCENDING AORTA: 3 CM
AST SERPL-CCNC: 38 U/L (ref 1–40)
BH CV ECHO MEAS - ACS: 1.83 CM
BH CV ECHO MEAS - AO MEAN PG: 6.8 MMHG
BH CV ECHO MEAS - AO ROOT DIAM: 3.1 CM
BH CV ECHO MEAS - AO V2 VTI: 31.5 CM
BH CV ECHO MEAS - AVA(I,D): 2.32 CM2
BH CV ECHO MEAS - EDV(CUBED): 42.8 ML
BH CV ECHO MEAS - EDV(MOD-SP2): 61 ML
BH CV ECHO MEAS - EDV(MOD-SP4): 78 ML
BH CV ECHO MEAS - EF(MOD-BP): 62.8 %
BH CV ECHO MEAS - EF(MOD-SP2): 60.7 %
BH CV ECHO MEAS - EF(MOD-SP4): 64.1 %
BH CV ECHO MEAS - ESV(CUBED): 12.5 ML
BH CV ECHO MEAS - ESV(MOD-SP2): 24 ML
BH CV ECHO MEAS - ESV(MOD-SP4): 28 ML
BH CV ECHO MEAS - FS: 33.7 %
BH CV ECHO MEAS - IVS/LVPW: 1.22 CM
BH CV ECHO MEAS - IVSD: 1.59 CM
BH CV ECHO MEAS - LA DIMENSION: 3.6 CM
BH CV ECHO MEAS - LAT PEAK E' VEL: 8.9 CM/SEC
BH CV ECHO MEAS - LV DIASTOLIC VOL/BSA (35-75): 42.9 CM2
BH CV ECHO MEAS - LV MASS(C)D: 181.8 GRAMS
BH CV ECHO MEAS - LV MEAN PG: 2.6 MMHG
BH CV ECHO MEAS - LV SYSTOLIC VOL/BSA (12-30): 15.4 CM2
BH CV ECHO MEAS - LV V1 VTI: 21.8 CM
BH CV ECHO MEAS - LVIDD: 3.5 CM
BH CV ECHO MEAS - LVIDS: 2.32 CM
BH CV ECHO MEAS - LVOT AREA: 3.4 CM2
BH CV ECHO MEAS - LVOT DIAM: 2.07 CM
BH CV ECHO MEAS - LVPWD: 1.3 CM
BH CV ECHO MEAS - MED PEAK E' VEL: 5 CM/SEC
BH CV ECHO MEAS - MV A DUR: 0.13 SEC
BH CV ECHO MEAS - MV A MAX VEL: 112.1 CM/SEC
BH CV ECHO MEAS - MV DEC SLOPE: 281.5 CM/SEC2
BH CV ECHO MEAS - MV DEC TIME: 347 SEC
BH CV ECHO MEAS - MV E MAX VEL: 79.3 CM/SEC
BH CV ECHO MEAS - MV E/A: 0.71
BH CV ECHO MEAS - MV MAX PG: 6.1 MMHG
BH CV ECHO MEAS - MV MEAN PG: 2.6 MMHG
BH CV ECHO MEAS - MV P1/2T: 104.5 MSEC
BH CV ECHO MEAS - MV V2 VTI: 26.5 CM
BH CV ECHO MEAS - MVA(P1/2T): 2.11 CM2
BH CV ECHO MEAS - MVA(VTI): 2.8 CM2
BH CV ECHO MEAS - PA ACC TIME: 0.13 SEC
BH CV ECHO MEAS - PA V2 MAX: 122.2 CM/SEC
BH CV ECHO MEAS - QP/QS: 0.77
BH CV ECHO MEAS - RAP SYSTOLE: 3 MMHG
BH CV ECHO MEAS - RV MAX PG: 3.3 MMHG
BH CV ECHO MEAS - RV V1 MAX: 90.8 CM/SEC
BH CV ECHO MEAS - RV V1 VTI: 12.6 CM
BH CV ECHO MEAS - RVDD: 2.6 CM
BH CV ECHO MEAS - RVOT DIAM: 2.38 CM
BH CV ECHO MEAS - RVSP: 12.5 MMHG
BH CV ECHO MEAS - SI(MOD-SP2): 20.3 ML/M2
BH CV ECHO MEAS - SI(MOD-SP4): 27.5 ML/M2
BH CV ECHO MEAS - SV(LVOT): 73.1 ML
BH CV ECHO MEAS - SV(MOD-SP2): 37 ML
BH CV ECHO MEAS - SV(MOD-SP4): 50 ML
BH CV ECHO MEAS - SV(RVOT): 56.2 ML
BH CV ECHO MEAS - TAPSE (>1.6): 2.21 CM
BH CV ECHO MEAS - TR MAX PG: 9.5 MMHG
BH CV ECHO MEAS - TR MAX VEL: 154 CM/SEC
BH CV ECHO MEASUREMENTS AVERAGE E/E' RATIO: 11.41
BH CV XLRA - RV BASE: 3.3 CM
BH CV XLRA - RV LENGTH: 5.2 CM
BH CV XLRA - RV MID: 2.2 CM
BH CV XLRA - TDI S': 23.8 CM/SEC
BILIRUB SERPL-MCNC: 0.5 MG/DL (ref 0–1.2)
BUN SERPL-MCNC: 31 MG/DL (ref 8–23)
BUN/CREAT SERPL: 42.5 (ref 7–25)
CALCIUM SPEC-SCNC: 8.5 MG/DL (ref 8.6–10.5)
CHLORIDE SERPL-SCNC: 100 MMOL/L (ref 98–107)
CO2 SERPL-SCNC: 24 MMOL/L (ref 22–29)
CREAT SERPL-MCNC: 0.73 MG/DL (ref 0.76–1.27)
DEPRECATED RDW RBC AUTO: 55.1 FL (ref 37–54)
EGFRCR SERPLBLD CKD-EPI 2021: 93.1 ML/MIN/1.73
ERYTHROCYTE [DISTWIDTH] IN BLOOD BY AUTOMATED COUNT: 12.9 % (ref 12.3–15.4)
GEN 5 2HR TROPONIN T REFLEX: 19 NG/L
GLOBULIN UR ELPH-MCNC: 3.1 GM/DL
GLUCOSE BLDC GLUCOMTR-MCNC: 195 MG/DL (ref 70–130)
GLUCOSE BLDC GLUCOMTR-MCNC: 268 MG/DL (ref 70–130)
GLUCOSE BLDC GLUCOMTR-MCNC: 285 MG/DL (ref 70–130)
GLUCOSE BLDC GLUCOMTR-MCNC: 318 MG/DL (ref 70–130)
GLUCOSE SERPL-MCNC: 263 MG/DL (ref 65–99)
HCT VFR BLD AUTO: 36.5 % (ref 37.5–51)
HGB BLD-MCNC: 11.9 G/DL (ref 13–17.7)
LEFT ATRIUM VOLUME INDEX: 18.1 ML/M2
MAGNESIUM SERPL-MCNC: 2.1 MG/DL (ref 1.6–2.4)
MCH RBC QN AUTO: 38 PG (ref 26.6–33)
MCHC RBC AUTO-ENTMCNC: 32.6 G/DL (ref 31.5–35.7)
MCV RBC AUTO: 116.6 FL (ref 79–97)
PLATELET # BLD AUTO: 159 10*3/MM3 (ref 140–450)
PMV BLD AUTO: 12.5 FL (ref 6–12)
POTASSIUM SERPL-SCNC: 4.3 MMOL/L (ref 3.5–5.2)
PROCALCITONIN SERPL-MCNC: 0.76 NG/ML (ref 0–0.25)
PROT SERPL-MCNC: 5.6 G/DL (ref 6–8.5)
QT INTERVAL: 368 MS
QTC INTERVAL: 440 MS
RBC # BLD AUTO: 3.13 10*6/MM3 (ref 4.14–5.8)
SINUS: 3.1 CM
SODIUM SERPL-SCNC: 134 MMOL/L (ref 136–145)
STJ: 2.9 CM
TROPONIN T DELTA: 3 NG/L
TROPONIN T SERPL HS-MCNC: 12 NG/L
TROPONIN T SERPL HS-MCNC: 16 NG/L
WBC NRBC COR # BLD: 17.16 10*3/MM3 (ref 3.4–10.8)

## 2023-11-02 PROCEDURE — 93010 ELECTROCARDIOGRAM REPORT: CPT | Performed by: INTERNAL MEDICINE

## 2023-11-02 PROCEDURE — 99222 1ST HOSP IP/OBS MODERATE 55: CPT | Performed by: INTERNAL MEDICINE

## 2023-11-02 PROCEDURE — 93306 TTE W/DOPPLER COMPLETE: CPT | Performed by: INTERNAL MEDICINE

## 2023-11-02 PROCEDURE — 93005 ELECTROCARDIOGRAM TRACING: CPT | Performed by: HOSPITALIST

## 2023-11-02 PROCEDURE — 25010000002 METRONIDAZOLE 500 MG/100ML SOLUTION: Performed by: INTERNAL MEDICINE

## 2023-11-02 PROCEDURE — 93306 TTE W/DOPPLER COMPLETE: CPT

## 2023-11-02 PROCEDURE — 84145 PROCALCITONIN (PCT): CPT | Performed by: INTERNAL MEDICINE

## 2023-11-02 PROCEDURE — 63710000001 INSULIN REGULAR HUMAN PER 5 UNITS: Performed by: HOSPITALIST

## 2023-11-02 PROCEDURE — 25010000002 CEFEPIME PER 500 MG: Performed by: INTERNAL MEDICINE

## 2023-11-02 PROCEDURE — 87040 BLOOD CULTURE FOR BACTERIA: CPT | Performed by: INTERNAL MEDICINE

## 2023-11-02 PROCEDURE — 84484 ASSAY OF TROPONIN QUANT: CPT | Performed by: INTERNAL MEDICINE

## 2023-11-02 PROCEDURE — 82948 REAGENT STRIP/BLOOD GLUCOSE: CPT

## 2023-11-02 PROCEDURE — 83735 ASSAY OF MAGNESIUM: CPT | Performed by: HOSPITALIST

## 2023-11-02 PROCEDURE — 84484 ASSAY OF TROPONIN QUANT: CPT | Performed by: HOSPITALIST

## 2023-11-02 PROCEDURE — 25510000001 PERFLUTREN (DEFINITY) 8.476 MG IN SODIUM CHLORIDE (PF) 0.9 % 10 ML INJECTION: Performed by: INTERNAL MEDICINE

## 2023-11-02 PROCEDURE — 80053 COMPREHEN METABOLIC PANEL: CPT | Performed by: HOSPITALIST

## 2023-11-02 PROCEDURE — 71045 X-RAY EXAM CHEST 1 VIEW: CPT

## 2023-11-02 PROCEDURE — 63710000001 DEXAMETHASONE PER 0.25 MG: Performed by: HOSPITALIST

## 2023-11-02 PROCEDURE — 85027 COMPLETE CBC AUTOMATED: CPT | Performed by: HOSPITALIST

## 2023-11-02 RX ORDER — AMLODIPINE BESYLATE 5 MG/1
5 TABLET ORAL DAILY
Status: DISCONTINUED | OUTPATIENT
Start: 2023-11-03 | End: 2023-11-09 | Stop reason: HOSPADM

## 2023-11-02 RX ORDER — METRONIDAZOLE 500 MG/100ML
500 INJECTION, SOLUTION INTRAVENOUS EVERY 8 HOURS
Status: COMPLETED | OUTPATIENT
Start: 2023-11-02 | End: 2023-11-09

## 2023-11-02 RX ADMIN — SUCRALFATE 1 G: 1 TABLET ORAL at 05:34

## 2023-11-02 RX ADMIN — Medication 1 APPLICATION: at 17:50

## 2023-11-02 RX ADMIN — HYDROCODONE BITARTRATE AND ACETAMINOPHEN 1 TABLET: 10; 325 TABLET ORAL at 21:21

## 2023-11-02 RX ADMIN — SUCRALFATE 1 G: 1 TABLET ORAL at 17:50

## 2023-11-02 RX ADMIN — HYDROCODONE BITARTRATE AND ACETAMINOPHEN 1 TABLET: 10; 325 TABLET ORAL at 10:00

## 2023-11-02 RX ADMIN — APIXABAN 5 MG: 5 TABLET, FILM COATED ORAL at 21:21

## 2023-11-02 RX ADMIN — Medication 1 APPLICATION: at 12:36

## 2023-11-02 RX ADMIN — METRONIDAZOLE 500 MG: 500 INJECTION, SOLUTION INTRAVENOUS at 12:34

## 2023-11-02 RX ADMIN — INSULIN HUMAN 6 UNITS: 100 INJECTION, SOLUTION PARENTERAL at 12:36

## 2023-11-02 RX ADMIN — PERFLUTREN 2 ML: 6.52 INJECTION, SUSPENSION INTRAVENOUS at 15:00

## 2023-11-02 RX ADMIN — AMITRIPTYLINE HYDROCHLORIDE 50 MG: 50 TABLET, FILM COATED ORAL at 21:21

## 2023-11-02 RX ADMIN — FUROSEMIDE 20 MG: 20 TABLET ORAL at 08:36

## 2023-11-02 RX ADMIN — Medication 1 APPLICATION: at 22:39

## 2023-11-02 RX ADMIN — CEFEPIME 2000 MG: 2 INJECTION, POWDER, FOR SOLUTION INTRAVENOUS at 17:50

## 2023-11-02 RX ADMIN — ACETAMINOPHEN 650 MG: 325 SUSPENSION ORAL at 08:48

## 2023-11-02 RX ADMIN — CEFEPIME 2000 MG: 2 INJECTION, POWDER, FOR SOLUTION INTRAVENOUS at 10:41

## 2023-11-02 RX ADMIN — INSULIN HUMAN 7 UNITS: 100 INJECTION, SOLUTION PARENTERAL at 17:50

## 2023-11-02 RX ADMIN — INSULIN HUMAN 6 UNITS: 100 INJECTION, SOLUTION PARENTERAL at 05:51

## 2023-11-02 RX ADMIN — METRONIDAZOLE 500 MG: 500 INJECTION, SOLUTION INTRAVENOUS at 21:22

## 2023-11-02 RX ADMIN — INSULIN HUMAN 2 UNITS: 100 INJECTION, SOLUTION PARENTERAL at 00:46

## 2023-11-02 RX ADMIN — DEXAMETHASONE 6 MG: 4 TABLET ORAL at 08:36

## 2023-11-02 RX ADMIN — LANSOPRAZOLE 30 MG: 15 TABLET, ORALLY DISINTEGRATING ORAL at 08:36

## 2023-11-02 RX ADMIN — AMLODIPINE BESYLATE 2.5 MG: 5 TABLET ORAL at 08:36

## 2023-11-02 RX ADMIN — Medication 1 APPLICATION: at 08:38

## 2023-11-02 RX ADMIN — APIXABAN 5 MG: 5 TABLET, FILM COATED ORAL at 08:36

## 2023-11-02 NOTE — PLAN OF CARE
Goal Outcome Evaluation:    Vss overnight, pt able to be weaned down to RA.   Tube feeds @ goal rate of 65ml/hr. PO pain medication given for pain. Q2 turn.

## 2023-11-02 NOTE — PROGRESS NOTES
"Nutrition Services    Patient Name:  Madhu Santoro  YOB: 1945  MRN: [id number]  Admit Date:  10/23/2023    Assessment Date:  11/02/23    Summary: TF follow up  TFs of Fibersource HN running at goal rate of 65 mL/hr with 35 ml/hr free water flushes.   S/p SLP re-evaluation yesterday, failed and recommended VFSS.  May need to consider PEG.  Meds: flagyl, lasix, dexamethasone, SSI, prevacid, carafate  Labs: Na 134, Gluc 268/263/285, BUN 31, Crea 0.73 ALT 50    Recommend:  Change TF's to Diabetisource AC @ goal rate of 65 ml/hr with 20 ml/hr free water flushes due to elevated blood sugars.   Advance diet as tolerates per SLP recommendations.     Will follow closely for tolerance and ability to advance diet.    CLINICAL NUTRITION ASSESSMENT      Reason for Assessment Follow-up Protocol, Tube Feeding Assessment      Diagnosis/Problem   Fall at home, weakness, malaise, fatigue  Dx: COVID-19, HTN, PAD, chronic anticoagulation, Aflutter s/p ablation, s/p CABG, CAD. HTN, HLD, CVA, chronic pain, gout     Anthropometrics        Current Height  Current Weight  BMI kg/m2 Height: 172.7 cm (68\")  Weight: 68.9 kg (152 lb) (11/02/23 1501)  Body mass index is 23.11 kg/m².   Adjusted BMI (if applicable)    BMI Category Normal/Healthy (18.4 - 24.9)   Ideal Body Weight (IBW) 150 lb   Usual Body Weight (UBW) 170-174 lb   Weight Trend Loss, Amount/Timeframe: 13 lb (7.5%) wt loss x 1 week   --  Estimated/Assessed Needs        Current Weight  Weight: 68.9 kg (152 lb) (11/02/23 1501)       Energy Requirements    Weight for Calculation 73 kg   Method for Estimation  25 kcal/kg   EST Needs (kcal/day) 1825 kcals       Protein Requirements    Weight for Calculation 73 kg   EST Protein Needs (g/kg) 1.2 - 1.5 gm/kg   EST Daily Needs (g/day)  g       Fluid Requirements     Method for Estimation 30 mL/kg    EST Needs (mL/day) 2190 ml     Labs       Pertinent Labs    Results from last 7 days   Lab Units 11/02/23  0854 " 11/01/23  1211 10/31/23  0541   SODIUM mmol/L 134* 137 132*   POTASSIUM mmol/L 4.3 4.1 4.6   CHLORIDE mmol/L 100 102 101   CO2 mmol/L 24.0 26.9 21.0*   BUN mg/dL 31* 31* 29*   CREATININE mg/dL 0.73* 0.83 0.84   CALCIUM mg/dL 8.5* 8.6 8.6   BILIRUBIN mg/dL 0.5 0.6 0.6   ALK PHOS U/L 112 123* 147*   ALT (SGPT) U/L 50* 46* 69*   AST (SGOT) U/L 38 36 52*   GLUCOSE mg/dL 263* 183* 184*     Results from last 7 days   Lab Units 11/02/23  0854 10/31/23  0541 10/30/23  0648 10/27/23  0540 10/27/23  0539   MAGNESIUM mg/dL 2.1  --  2.2  --  3.1*   PHOSPHORUS mg/dL  --   --  2.3*   < > 2.2*   HEMOGLOBIN g/dL 11.9*   < >  --    < >  --    HEMATOCRIT % 36.5*   < >  --    < >  --    WBC 10*3/mm3 17.16*   < >  --    < >  --    ALBUMIN g/dL 2.5*   < >  --   --  2.7*    < > = values in this interval not displayed.     Results from last 7 days   Lab Units 11/02/23  0854 11/01/23  1211 10/31/23  0541 10/27/23  0540   PLATELETS 10*3/mm3 159 143 182 156     COVID19   Date Value Ref Range Status   10/23/2023 Detected (C) Not Detected - Ref. Range Final     Lab Results   Component Value Date    HGBA1C 6.30 (H) 02/20/2023          Medications           Scheduled Medications [Held by provider] allopurinol, 300 mg, Oral, Daily  amitriptyline, 50 mg, Oral, Nightly  [START ON 11/3/2023] amLODIPine, 5 mg, Oral, Daily  apixaban, 5 mg, Oral, BID  cefepime, 2,000 mg, Intravenous, Q8H  dexAMETHasone, 6 mg, Oral, Daily With Breakfast  furosemide, 20 mg, Oral, Daily  insulin regular, 2-9 Units, Subcutaneous, Q6H  lansoprazole, 30 mg, Nasogastric, QAM AC  Menthol-Zinc Oxide, 1 application , Topical, 4x Daily  metroNIDAZOLE, 500 mg, Intravenous, Q8H  oxybutynin XL, 5 mg, Oral, Daily  sucralfate, 1 g, Oral, BID AC       Infusions     PRN Medications   acetaminophen **OR** acetaminophen **OR** acetaminophen    acetaminophen **OR** acetaminophen    benzocaine-menthol    senna-docusate sodium **AND** polyethylene glycol **AND** bisacodyl **AND** bisacodyl     dextrose    dextrose    glucagon (human recombinant)    guaifenesin    HYDROcodone Bit-Homatrop MBr    HYDROcodone-acetaminophen    nitroglycerin    ondansetron **OR** ondansetron    phenol    [COMPLETED] Insert Peripheral IV **AND** sodium chloride    sodium chloride     Physical Findings          General Findings alert, impaired speech, oriented, on oxygen therapy   Oral/Mouth Cavity dry mouth, tooth or teeth missing   Edema  lower extremity , 1+ (trace)   Gastrointestinal fecal incontinence, hypoactive bowel sounds, non-distended , last bowel movement: 10/31   Skin  bruising, pressure injury: coccyx- deep tissue   Tubes/Drains/Lines Cortrak, ND tube, bridle in place   NFPE No clinical signs of muscle wasting or fat loss   --  Current Nutrition Orders & Evaluation of Intake       Oral Nutrition     Food Allergies NKFA   Current PO Diet NPO Diet NPO Type: Strict NPO   Supplement n/a   PO Evaluation     % PO Intake N/a    Factors Affecting Intake: swallow impairment, weakness   --   Enteral Nutrition     Enteral Route ND    TF Delivery Method Continuous    Propofol Rate/Kcal     Current TF Order/Rate  Fibersource HN @ 65 mL/hr    TF Goal Rate 65 mL/hr    Current Water Flush 35 mL Q 1 hr    Modular None    TF Residual  no or minimal residual    TF Tolerance tolerating, other: elevated blood sugar    TF Observation Verified correct TF and water flush infusing per orders     PES STATEMENT / NUTRITION DIAGNOSIS      Nutrition Dx Problem  Problem: Inadequate Protein Energy Intake  Etiology: Factors Affecting Nutrition - swallow impairment    Signs/Symptoms: NPO and SLP/Swallow Evaluation     NUTRITION INTERVENTION / PLAN OF CARE      Intervention Goal(s) Maintain nutrition status, Reduce/improve symptoms, Meet estimated needs, Disease management/therapy, Initiate TF/PN, Tolerate TF/PN at goal, Transition TF to PO, and Maintain weight         RD Intervention/Action Continue to monitor and Care plan reviewed   --       Prescription/Orders:       PO Diet       Supplements       Enteral Nutrition    Enteral Prescription:     Enteral Route ND    TF Delivery Method Continuous    Enteral Product Diabetisource AC    Modular None    Propofol Rate/Kcal     TF Start Rate  65 mL/hr    TF Goal Rate  65 mL/hr    Free Water Flush 20 mL Q 1 hr    Provision at Goal:          Calories 1872 kcal, meets 100 % needs         Protein  93 gm protein, meets 100 % needs         Fluid (mL) 1272 mL free water + 480 mL in flushes    Prescription Ordered Yes         Parenteral Nutrition    New Prescription Ordered? Yes   --      Monitor/Evaluation Per protocol   Discharge Plan/Needs Pending clinical course   --    RD to follow per protocol.      Electronically signed by:  Iliana Ceja RD  11/02/23 16:35 EDT

## 2023-11-02 NOTE — CONSULTS
Date of Hospital Visit: 23  Encounter Provider: Shelby Rudd MD  Place of Service: [facility] CARDIOLOGY  Patient Name: Madhu Santoro  :1945  Referral Provider: Jewell Rios MD    Chief complaint: COVID-19    History of Present Illness:    Madhu Santoro is a 77 y.o. male who previously followed with Dr. Wright and has a past medical history that is significant for coronary artery disease s/p CABG, prior carotid endarterectomy, and atrial flutter s/p ablation.     In 2017 he was found to be in atrial flutter, he was asymptomatic with this. He underwent a diagnostic left heart catheterization during that hospitalization which showed severe coronary artery disease. He was referred for surgical intervention and underwent a LIMA to LAD, SVG to OM1, and SVG to OM3 with Dr. Cortez on 17. He had issues with rapid atrial flutter post-operatively and underwent a cavotricuspid isthmus atrial flutter ablation with Dr. Gustafson on 17. 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 He was transitioned off his Plavix to aspirin and started on Eliquis. He has been doing well since.     He presented to the ED on 10/23/23 with complaints of progressive weakness over the last 7 days. He was unable to get out of bed, he had a dry cough and decreased appetite. He was found to have COVID-19.     On 10/25 his oxygen needs increased rapidly and he was requiring 15 L non-rebreather. His lactic was elevated and he was treated for sepsis. He was transferred to CICU. His chest xray was indicative of a secondary bacterial pneumonia. He responded well and was able to be transferred back to telemetry on 10/26. He had significant issues with oral pharyngeal dysphagia and was made NPO and required a feeding tube. He was evaluated by ENT on 10/30 and his swallowing issues  were felt to be related to severe deconditioning.     This morning he had an episode of chest pain that he described as midsternal pressure. It lasted 15 minutes and occurred at rest-better with Tylenol. He had associated dyspnea and tachycardia. His HS troponin was 16. Cardiology has been consulted to evaluate this.  He said that the nurse gave him something and it made the pain go away-according to nursing he got Norco and Tylenol, this is what seems to make his pain go away.  EKG showed sinus rhythm with PVCs in a trigeminal pattern, LVH and poor anterior R wave progression-when compared to prior, the PVCs were the only change.  He is coughing quite a bit.  He is weak with difficulty swallowing and has a slight headache.    He does not smoke.  He normally is very active.  He lives in with his wife Brooke.    ECHO 4/10/21    Estimated left ventricular EF = 57% Left ventricular systolic function is normal.  Left ventricular diastolic function was normal.  The right ventricular cavity is mildly dilated.  The right atrial cavity is mildly dilated.  Mild mitral annular calcification is present. There is mild, bileaflet mitral valve thickening present. Trace mitral valve regurgitation is present. No significant mitral valve stenosis is present.  No aortic valve regurgitation or stenosis is present. There is mild thickening of the aortic valve. The aortic valve appears trileaflet.    Cardiac Cath 4/10/17  FINDINGS:     CORONARY ANGIOGRAPHY:   1.  Left main: Short vessel with an 80% severely calcified stenosis that extends into the ostium of the left circumflex artery.  The vessel bifurcates into a left anterior descending artery and left circumflex artery.  2.  Left circumflex artery: As noted above there is a 80% ostial severely calcified stenosis that extends from the left main.  Left circumflex gives off a large first obtuse marginal branch with 0% stenosis.  The mid to distal left circumflex artery has 0% stenosis.   "The distal left circumflex artery appears to supply the PDA region.  This is a left dominant system.  3.  Left anterior descending artery: Severe proximal calcification.  The left main stenosis does  not appear to involve the ostium of the LAD.  There is 20% proximal LAD stenosis.  Vessel gives off a moderate size first diagonal branch with 0% stenosis.  The mid to distal LAD has 0% stenosis.  4.  Right coronary artery: This is a small, nondominant vessel with 0% stenosis.    Past Medical History:   Diagnosis Date    Anxiety     Arthritis     Atrial flutter     Status post cavotricuspid isthmus ablation by Dr. Gustafson on 4/18/17    Mandel esophagus     Benign essential hypertension     CAD (coronary artery disease)     3 vessel CABG 4/11/17 by Dr. Cortez: ROSARIO-prox LAD, SVG-OM1, SVG-OM3    Carotid artery disease     Status post carotid endarterectomy - [facility] 4/10/17: 50-59% NICHELLE, 1-15% LICA.     Colonic polyp     Cyst of pancreas     DDD (degenerative disc disease), lumbosacral     GERD (gastroesophageal reflux disease)     H/O bone density study 2013    H/O complete eye exam 2014    History of kidney stone     HLD (hyperlipidemia)     Hypertension     Kidney stone     8/22/22    Lipid screening 05/31/2013    Low back pain     physical therapy [facility] rehab 5-12-10    Screening for prostate cancer 07/07/2015    Skin cancer     nose    Stroke     RESIDUAL--\"BALANCE ISSUES\"       Past Surgical History:   Procedure Laterality Date    CARDIAC CATHETERIZATION N/A 04/10/2017    Procedure: Left Heart Cath;  Surgeon: Marjorie Healy MD;  Location: [facility] CATH INVASIVE LOCATION;  Service:     CARDIAC CATHETERIZATION N/A 04/10/2017    Procedure: Coronary angiography;  Surgeon: Marjorie Healy MD;  Location: [facility] CATH INVASIVE LOCATION;  Service:     CARDIAC CATHETERIZATION N/A 04/10/2017    Procedure: Left ventriculography;  Surgeon: Marjorie Healy MD;  Location: [facility] CATH INVASIVE LOCATION;  Service:     CARDIAC " CATHETERIZATION  2011    CARDIAC ELECTROPHYSIOLOGY PROCEDURE N/A 04/18/2017    Procedure: Ablation atrial flutter;  Surgeon: Jose Antonio Gustafson MD;  Location: [facility] CATH INVASIVE LOCATION;  Service:     CAROTID ENDARTERECTOMY      CHOLECYSTECTOMY      CHOLECYSTECTOMY WITH INTRAOPERATIVE CHOLANGIOGRAM N/A 09/07/2019    Procedure: Laparoscopic cholecystectomy with intraoperative cholangiogram;  Surgeon: Gauri Travis MD;  Location: [facility] MAIN OR;  Service: General    COLONOSCOPY  01/06/2015    Diverticulosis, one TA    COLONOSCOPY N/A 02/14/2019    tics, NBIH, adenomatous polyp x 2    COLONOSCOPY N/A 11/05/2021    Procedure: COLONOSCOPY TO CECUM AND TERM. ILEUM WITH COLD POLYPECTOMIES;  Surgeon: Everton Abel MD;  Location: [facility] ENDOSCOPY;  Service: Gastroenterology;  Laterality: N/A;  PRE OP - PERS H/O POLYPS  POST OP - COLON POLYPS,, DIVERTICULOSIS, HEMORRHOIDS    CORONARY ARTERY BYPASS GRAFT N/A 04/11/2017    Procedure: AR STERNOTOMY CORONARY ARTERY BYPASS GRAFT TIMES 3 USING LEFT INTERNAL MAMMARY ARTERY AND LEFT GREATER SAPHENOUS VEIN GRAFT PER ENDOSCOPIC VEIN HARVESTING AND PRP ;  Surgeon: Temo Cortez MD;  Location: [facility] MAIN OR;  Service:     ENDOSCOPY  01/06/2015    HH, Ervin's esophagus    ENDOSCOPY N/A 02/14/2019    Z line irregular, HH, Ervin's esophagus    ENDOSCOPY N/A 11/05/2021    Procedure: ESOPHAGOGASTRODUODENOSCOPY WITH BIOPSIES;  Surgeon: Everton Abel MD;  Location: [facility] ENDOSCOPY;  Service: Gastroenterology;  Laterality: N/A;  PRE OP - PERS H/O ERVIN'S  POST OP - IRREG Z LINE    KNEE SURGERY Left     URETEROSCOPY LASER LITHOTRIPSY WITH STENT INSERTION Left 8/23/2022    Procedure: Cysto retrograde with left uretro stent placement;  Surgeon: Damien Oliveira MD;  Location: [facility] MAIN OR;  Service: Urology;  Laterality: Left;    VASECTOMY         Facility-Administered Medications Prior to Admission   Medication Dose Route Frequency Provider Last Rate Last Admin     cyanocobalamin injection 1,000 mcg  1,000 mcg Intramuscular Q30 Days Damian Sanchez MD   1,000 mcg at 04/06/23 0957     Medications Prior to Admission   Medication Sig Dispense Refill Last Dose    allopurinol (Zyloprim) 300 MG tablet Take 1 tablet by mouth Daily. 90 tablet 1 10/22/2023    amitriptyline (ELAVIL) 50 MG tablet TAKE ONE TABLET BY MOUTH ONCE NIGHTLY 30 tablet 3 10/22/2023    amLODIPine (NORVASC) 2.5 MG tablet Take 1 tablet by mouth Daily. 90 tablet 3 10/22/2023    apixaban (ELIQUIS) 5 MG tablet tablet Take 1 tablet by mouth 2 (Two) Times a Day. 90 tablet 3 10/22/2023    HYDROcodone-acetaminophen (NORCO)  MG per tablet Take 1 tablet by mouth Every 8 (Eight) Hours As Needed for Severe Pain. 30 day supply 75 tablet 0 Past Week    hydroxyurea (HYDREA) 500 MG capsule Take 1 capsule by mouth Every Other Day. 15 capsule 5 Past Week    losartan (Cozaar) 50 MG tablet Take 1 tablet by mouth Daily. 90 tablet 1 10/22/2023    Myrbetriq 25 MG tablet sustained-release 24 hour 24 hr tablet Take 1 tablet by mouth Daily.   10/22/2023    potassium chloride 10 MEQ CR tablet TAKE ONE TABLET BY MOUTH DAILY 7 tablet 1 Past Week    RABEprazole (Aciphex) 20 MG EC tablet Take 1 tablet by mouth Daily. 90 tablet 1 Past Week    rosuvastatin (Crestor) 20 MG tablet Take 1 tablet by mouth Daily. 90 tablet 1 Past Week    furosemide (LASIX) 20 MG tablet TAKE ONE TABLET BY MOUTH DAILY 7 tablet 1     HYDROcodone-acetaminophen (NORCO) 5-325 MG per tablet Take 2 tablets by mouth Every 8 (Eight) Hours As Needed for Severe Pain. 30 day supply. DNF 10/19/23 150 tablet 0     Ibuprofen 3 %, Gabapentin 10 %, Baclofen 2 %, lidocaine 4 %, Ketamine HCl 4 % Apply 1-2 g topically to the appropriate area as directed 3 (Three) to 4 (Four) times daily. 90 g 0     mupirocin (BACTROBAN) 2 % cream Apply 1 application topically to the appropriate area as directed 2 (Two) Times a Day. 15 g 0        Current Meds  Scheduled Meds:[Held by provider]  allopurinol, 300 mg, Oral, Daily  amitriptyline, 50 mg, Oral, Nightly  amLODIPine, 2.5 mg, Oral, Daily  apixaban, 5 mg, Oral, BID  cefepime, 2,000 mg, Intravenous, Q8H  dexAMETHasone, 6 mg, Oral, Daily With Breakfast  furosemide, 20 mg, Oral, Daily  insulin regular, 2-9 Units, Subcutaneous, Q6H  lansoprazole, 30 mg, Nasogastric, QAM AC  Menthol-Zinc Oxide, 1 application , Topical, 4x Daily  metroNIDAZOLE, 500 mg, Intravenous, Q8H  oxybutynin XL, 5 mg, Oral, Daily  sucralfate, 1 g, Oral, BID AC      Continuous Infusions:   PRN Meds:.  acetaminophen **OR** acetaminophen **OR** acetaminophen    acetaminophen **OR** acetaminophen    benzocaine-menthol    senna-docusate sodium **AND** polyethylene glycol **AND** bisacodyl **AND** bisacodyl    dextrose    dextrose    glucagon (human recombinant)    guaifenesin    HYDROcodone Bit-Homatrop MBr    HYDROcodone-acetaminophen    nitroglycerin    ondansetron **OR** ondansetron    phenol    [COMPLETED] Insert Peripheral IV **AND** sodium chloride    sodium chloride    Allergies as of 10/23/2023 - Reviewed 10/23/2023   Allergen Reaction Noted    Atorvastatin Myalgia 2018    Penicillins Hives, Swelling, and Rash 2013       Social History     Socioeconomic History    Marital status:      Spouse name: Brooke    Years of education: 9th grade   Tobacco Use    Smoking status: Former     Packs/day: 1     Types: Cigarettes     Start date: 1959     Quit date: 2009     Years since quittin.8    Smokeless tobacco: Never    Tobacco comments:     CAFFEINE USE   Vaping Use    Vaping Use: Never used   Substance and Sexual Activity    Alcohol use: Not Currently     Comment: rarely    Drug use: No    Sexual activity: Defer     Partners: Female       Family History   Problem Relation Age of Onset    Hypertension Mother     Heart disease Mother     Heart attack Mother     Stroke Mother     Heart disease Father     Heart attack Father     Stroke Father     Hypertension  "Sister     Heart attack Brother     Heart disease Brother     No Known Problems Brother     Heart disease Brother     Heart attack Brother     Diabetes Brother     No Known Problems Maternal Grandmother     No Known Problems Maternal Grandfather     No Known Problems Paternal Grandmother     No Known Problems Paternal Grandfather     Pancreatic cancer Paternal Cousin     Arthritis Other     Diabetes Other     Hypertension Other     Kidney disease Other         stones    Malig Hyperthermia Neg Hx        REVIEW OF SYSTEMS:   12 point ROS was performed and is negative except as outlined in HPI         Objective:   Temp:  [97.5 °F (36.4 °C)-97.9 °F (36.6 °C)] 97.9 °F (36.6 °C)  Heart Rate:  [80-96] 91  Resp:  [18-28] 18  BP: (112-155)/(55-78) 150/78  Body mass index is 23.2 kg/m².  Flowsheet Rows      Flowsheet Row First Filed Value   Admission Height 172.7 cm (68\") Documented at 10/23/2023 0536   Admission Weight 78.8 kg (173 lb 12.8 oz) Documented at 10/23/2023 0536          Vitals:    11/02/23 0800   BP: 150/78   Pulse: 91   Resp: 18   Temp: 97.9 °F (36.6 °C)   SpO2: 94%       General Appearance:    Alert, cooperative, in no acute distress   Head:    Normocephalic, without obvious abnormality, atraumatic   Eyes:            Lids and lashes normal, conjunctivae and sclerae normal, no   icterus, no pallor, corneas clear   Ears:    Ears appear intact with no abnormalities noted   Throat:   No oral lesions, no thrush, oral mucosa moist   Neck:   No adenopathy, supple, trachea midline, no thyromegaly, no   carotid bruit, no JVD   Back:     No kyphosis present, no scoliosis present, no skin lesions, erythema or scars, no tenderness to percussion or palpation, range of motion normal   Lungs:   Expiratory rhonchi throughout,respirations regular, even and unlabored    Heart:    Regular rhythm and normal rate, normal S1 and S2, no murmur, no gallop, no rub, no click   Chest Wall:    No abnormalities observed   Abdomen:     " Normal bowel sounds, no masses, no organomegaly, soft, nontender, nondistended, no guarding, no rebound  tenderness   Extremities:   Moves all extremities well, no edema, no cyanosis, no redness   Pulses:   Pulses palpable and equal bilaterally. Normal radial, carotid,  dorsalis pedis and posterior tibial pulses bilaterally.    Skin:  Psychiatric:   No bleeding, bruising or rash    Alert and oriented x 3, normal mood and affect                 Lab Review:      Results from last 7 days   Lab Units 11/02/23  0854   SODIUM mmol/L 134*   POTASSIUM mmol/L 4.3   CHLORIDE mmol/L 100   CO2 mmol/L 24.0   BUN mg/dL 31*   CREATININE mg/dL 0.73*   CALCIUM mg/dL 8.5*   BILIRUBIN mg/dL 0.5   ALK PHOS U/L 112   ALT (SGPT) U/L 50*   AST (SGOT) U/L 38   GLUCOSE mg/dL 263*     Results from last 7 days   Lab Units 11/02/23  0854   HSTROP T ng/L 16*     @LABRCNT(bnp)@  Results from last 7 days   Lab Units 11/02/23  0854 11/01/23  1211 10/31/23  0541   WBC 10*3/mm3 17.16* 17.36* 19.35*   HEMOGLOBIN g/dL 11.9* 11.9* 13.6   HEMATOCRIT % 36.5* 35.5* 39.5   PLATELETS 10*3/mm3 159 143 182         Results from last 7 days   Lab Units 11/02/23  0854   MAGNESIUM mg/dL 2.1     Lipid Panel          1/6/2023    08:22 7/3/2023    08:51   Lipid Panel   Total Cholesterol 119  113    Triglycerides 96  83    HDL Cholesterol 38  38    VLDL Cholesterol 18  17    LDL Cholesterol  63  58                I personally viewed and interpreted the patient's EKG/Telemetry data        COVID-19    Coronary artery disease due to lipid rich plaque    HLD (hyperlipidemia)    Benign essential hypertension    Cerebrovascular accident    Stenosis of carotid artery    S/P ablation of atrial flutter    S/P CABG (coronary artery bypass graft)    PAD (peripheral artery disease)    Chronic anticoagulation    Polycythemia      Assessment and Plan:    COVID-19 pneumonia  Secondary bacterial pneumonia  Dysphagia on tube feeds  Chest pain this morning, atypical, slight elevation  of high-sensitivity troponin but unremarkable ECG, pain occurred at rest relieved-this occurring in the setting of a patient with pneumonia and a lot of coughing  History of CAD, status post CABG  History of atrial fibrillation with ablation  History of left MCA stroke-remain on apixaban    Chest x-ray does not show pulmonary edema, blood pressure slightly high as well as heart rate.  Increase amlodipine to 5 mg daily.  Blood sugar high but otherwise stable labs.  Check echocardiogram and full motion is normal, I do not think any further work-up is needed.  I am not particular concerned for an ACS, I think his pain is very typical      We will sign off if echo is normal.    Shelby Rudd MD  11/02/23  12:29 EDT.  Time spent in reviewing chart, discussion and examination:

## 2023-11-02 NOTE — PROGRESS NOTES
"  Daily Progress Note.   [address]  11/2/2023    Patient:  Name:  Madhu Santoro  MRN:  [id number]  1945  77 y.o.  male         CC: covid [facility]    Interval History:  Afebrile  Room air  Blood pressure stable to robust  No respiratory distress  Ill-appearing weak voice complaints of reflux heartburn    Physical Exam:  /78 (BP Location: Right arm, Patient Position: Lying)   Pulse 91   Temp 97.9 °F (36.6 °C) (Oral)   Resp 18   Ht 172.7 cm (68\")   Wt 69.2 kg (152 lb 8.9 oz)   SpO2 94%   BMI 23.20 kg/m²   Body mass index is 23.2 kg/m².    Intake/Output Summary (Last 24 hours) at 11/2/2023 1209  Last data filed at 11/1/2023 1800  Gross per 24 hour   Intake 1015 ml   Output --   Net 1015 ml     General appearance: ill weak appearing, conversant   Eyes: anicteric sclerae, moist conjunctivae; no lidlag;    HENT: Atraumatic; oropharynx +cortrak  Lungs:  some but improved rhonchi no wheeze, with normal respiratory effort and no intercostal retractions  CV: RRR, no rub   Abdomen: Soft, non rigid; BS+  Skin: WWP no diffuse visible rash  Psych: calm affect, alert   Neuro: Moves all ext. Grossly CN II-XII. Speech soft difficult to understand    Data Review:  Notable Labs:  Results from last 7 days   Lab Units 11/02/23  0854 11/01/23  1211 10/31/23  0541 10/27/23  0540   WBC 10*3/mm3 17.16* 17.36* 19.35* 10.54   HEMOGLOBIN g/dL 11.9* 11.9* 13.6 10.6*   PLATELETS 10*3/mm3 159 143 182 156     Results from last 7 days   Lab Units 11/02/23  0854 11/01/23  1211 10/31/23  0541 10/30/23  0648 10/28/23  0706 10/27/23  0539   SODIUM mmol/L 134* 137 132*  --   --  142   POTASSIUM mmol/L 4.3 4.1 4.6 4.1  --  4.5   CHLORIDE mmol/L 100 102 101  --   --  114*   CO2 mmol/L 24.0 26.9 21.0*  --   --  21.0*   BUN mg/dL 31* 31* 29*  --   --  34*   CREATININE mg/dL 0.73* 0.83 0.84  --   --  0.75*   GLUCOSE mg/dL 263* 183* 184*  --   --  120*   CALCIUM mg/dL 8.5* 8.6 8.6  --   --  8.2*   MAGNESIUM mg/dL 2.1  " --   --  2.2  --  3.1*   PHOSPHORUS mg/dL  --   --   --  2.3* 2.0* 2.2*   Estimated Creatinine Clearance: 81.6 mL/min (A) (by C-G formula based on SCr of 0.73 mg/dL (L)).    Results from last 7 days   Lab Units 11/02/23  0854 11/01/23  1211 10/31/23  0541 10/27/23  0540 10/27/23  0540 10/27/23  0539 10/27/23  0004 10/26/23  1738   0000   AST (SGOT) U/L 38 36 52*  --   --  189*  --   --    < >   ALT (SGPT) U/L 50* 46* 69*  --   --  134*  --   --    < >   PROCALCITONIN ng/mL 0.76*  --   --   --   --   --   --   --   --    LACTATE mmol/L  --   --   --   --  1.2  --  2.4* 2.0  --    CRP mg/dL  --   --   --   --   --  6.92*  --   --   --    PLATELETS 10*3/mm3 159 143 182   < > 156  --   --   --   --     < > = values in this interval not displayed.               Imaging:  Reviewed chest images personally from past 3 days    ASSESSMENT  /  PLAN:  COVID 19 pneumonia  Bacterial Pneumonia  Dysphagia  AFib    CAD s/p CABG  HTN  Chronic Pain  Small pleural effusion left      Remdesivir limited by transaminits - improving  Cont decadron for 10 days or hospital dc    Dysphagia plans noted.  Airway clr challenging with pt weakness overall  Lansoprazole.    Procalcitonin still up White count still up.  CT scan chest to evaluate for any empyema parapneumonic effusion continue cefepime add Flagyl given the patient's allergies.  Coordinated care with Dr. Faustino Lawrence    Electronically signed by Albert Daly MD, 11/02/23, 2:26 PM EDT.

## 2023-11-02 NOTE — PROGRESS NOTES
Name: Madhu Santoro ADMIT: 10/23/2023   : 1945  PCP: Damian Sanchez MD    MRN: [id number] LOS: 8 days   AGE/SEX: 77 y.o. male  ROOM: [facility]     Subjective   Subjective   Looks generally weak. Difficult to understand. Wife at bedside and helps. Discussed with nursing staff he had chest pain this morning he is not sure how long it lasted. No chest pain currently.     Objective   Objective   Vital Signs  Temp:  [97.3 °F (36.3 °C)-97.9 °F (36.6 °C)] 97.9 °F (36.6 °C)  Heart Rate:  [80-99] 91  Resp:  [18-28] 18  BP: (112-155)/(55-81) 150/78  SpO2:  [94 %-98 %] 94 %  on  Flow (L/min):  [2] 2;   Device (Oxygen Therapy): room air  Body mass index is 23.2 kg/m².    Physical Exam  Constitutional:       General: He is not in acute distress.     Appearance: He is ill-appearing. He is not toxic-appearing.   HENT:      Head: Normocephalic and atraumatic.      Nose:      Comments: NGT  Eyes:      Extraocular Movements: Extraocular movements intact.   Cardiovascular:      Rate and Rhythm: Normal rate and regular rhythm.   Pulmonary:      Effort: Pulmonary effort is normal. No respiratory distress.      Breath sounds: Normal breath sounds. No wheezing.   Abdominal:      General: Bowel sounds are normal.      Palpations: Abdomen is soft.      Tenderness: There is no abdominal tenderness. There is no guarding or rebound.   Musculoskeletal:         General: No swelling.      Cervical back: Normal range of motion.      Right lower leg: No edema.      Left lower leg: No edema.   Skin:     General: Skin is warm and dry.       Results Review  I reviewed the patient's new clinical results.  Results from last 7 days   Lab Units 23  0854 23  1211 10/31/23  0541 10/27/23  0540   WBC 10*3/mm3 17.16* 17.36* 19.35* 10.54   HEMOGLOBIN g/dL 11.9* 11.9* 13.6 10.6*   PLATELETS 10*3/mm3 159 143 182 156     Results from last 7 days   Lab Units 23  0854 23  1211 10/31/23  0541 10/30/23  0648 10/27/23  0539    SODIUM mmol/L 134* 137 132*  --  142   POTASSIUM mmol/L 4.3 4.1 4.6 4.1 4.5   CHLORIDE mmol/L 100 102 101  --  114*   CO2 mmol/L 24.0 26.9 21.0*  --  21.0*   BUN mg/dL 31* 31* 29*  --  34*   CREATININE mg/dL 0.73* 0.83 0.84  --  0.75*   GLUCOSE mg/dL 263* 183* 184*  --  120*     Lab Results   Component Value Date    ANIONGAP 10.0 11/02/2023     Estimated Creatinine Clearance: 81.6 mL/min (A) (by C-G formula based on SCr of 0.73 mg/dL (L)).   Lab Results   Component Value Date    EGFR 93.1 11/02/2023     Results from last 7 days   Lab Units 11/02/23  0854 11/01/23  1211 10/31/23  0541 10/27/23  0539   ALBUMIN g/dL 2.5* 2.8* 2.9* 2.7*   BILIRUBIN mg/dL 0.5 0.6 0.6 0.5   ALK PHOS U/L 112 123* 147* 154*   AST (SGOT) U/L 38 36 52* 189*   ALT (SGPT) U/L 50* 46* 69* 134*     Results from last 7 days   Lab Units 11/02/23  0854 11/01/23  1211 10/31/23  0541 10/30/23  0648 10/28/23  0706 10/27/23  0539   CALCIUM mg/dL 8.5* 8.6 8.6  --   --  8.2*   ALBUMIN g/dL 2.5* 2.8* 2.9*  --   --  2.7*   MAGNESIUM mg/dL 2.1  --   --  2.2  --  3.1*   PHOSPHORUS mg/dL  --   --   --  2.3* 2.0* 2.2*     Results from last 7 days   Lab Units 11/02/23  0854 10/27/23  0540 10/27/23  0004 10/26/23  1738   PROCALCITONIN ng/mL 0.76*  --   --   --    LACTATE mmol/L  --  1.2 2.4* 2.0     Glucose   Date/Time Value Ref Range Status   11/02/2023 0547 285 (H) 70 - 130 mg/dL Final   11/02/2023 0025 195 (H) 70 - 130 mg/dL Final   11/01/2023 1604 270 (H) 70 - 130 mg/dL Final   11/01/2023 1100 138 (H) 70 - 130 mg/dL Final   11/01/2023 0631 171 (H) 70 - 130 mg/dL Final   10/31/2023 2345 229 (H) 70 - 130 mg/dL Final   10/31/2023 1613 338 (H) 70 - 130 mg/dL Final       No radiology results for the last day    Scheduled Meds  [Held by provider] allopurinol, 300 mg, Oral, Daily  amitriptyline, 50 mg, Oral, Nightly  amLODIPine, 2.5 mg, Oral, Daily  apixaban, 5 mg, Oral, BID  dexAMETHasone, 6 mg, Oral, Daily With Breakfast  furosemide, 20 mg, Oral, Daily  insulin  regular, 2-9 Units, Subcutaneous, Q6H  lansoprazole, 30 mg, Nasogastric, QAM AC  Menthol-Zinc Oxide, 1 application , Topical, 4x Daily  oxybutynin XL, 5 mg, Oral, Daily  sucralfate, 1 g, Oral, BID AC    Continuous Infusions   PRN Meds    acetaminophen **OR** acetaminophen **OR** acetaminophen    acetaminophen **OR** acetaminophen    benzocaine-menthol    senna-docusate sodium **AND** polyethylene glycol **AND** bisacodyl **AND** bisacodyl    dextrose    dextrose    glucagon (human recombinant)    guaifenesin    HYDROcodone Bit-Homatrop MBr    HYDROcodone-acetaminophen    nitroglycerin    ondansetron **OR** ondansetron    phenol    [COMPLETED] Insert Peripheral IV **AND** sodium chloride    sodium chloride     Diet  NPO Diet NPO Type: Strict NPO    I have personally reviewed:  [x]  Medications  [x]  Laboratory   []  Microbiology   []  Radiology   [x]  EKG/Telemetry   []  Cardiology/Vascular   []  Pathology   []  Records           Results for orders placed during the hospital encounter of 04/10/21    Adult Transthoracic Echo Complete w/ Color, Spectral and Contrast if Necessary Per Protocol    Interpretation Summary  · Estimated left ventricular EF = 57% Left ventricular systolic function is normal.  · Left ventricular diastolic function was normal.  · The right ventricular cavity is mildly dilated.  · The right atrial cavity is mildly dilated.  · Mild mitral annular calcification is present. There is mild, bileaflet mitral valve thickening present. Trace mitral valve regurgitation is present. No significant mitral valve stenosis is present.  · No aortic valve regurgitation or stenosis is present. There is mild thickening of the aortic valve. The aortic valve appears trileaflet.        Assessment/Plan     Active Hospital Problems    Diagnosis  POA    **COVID-19 [U07.1]  Yes    Polycythemia [D75.1]  Yes    Chronic anticoagulation [Z79.01]  Not Applicable    PAD (peripheral artery disease) [I73.9]  Yes    Stenosis of  carotid artery [I65.29]  Yes    S/P ablation of atrial flutter [Z98.890, Z86.79]  Not Applicable    S/P CABG (coronary artery bypass graft) [Z95.1]  Not Applicable    Coronary artery disease due to lipid rich plaque [I25.10, I25.83]  Yes    HLD (hyperlipidemia) [E78.5]  Yes    Benign essential hypertension [I10]  Yes    Cerebrovascular accident [I63.9]  Yes      Resolved Hospital Problems   No resolved problems to display.     77 y.o. male former smoker with a history of HTN, a flutter, CVA, HLD, CAD w/ h/o CABG, polycythemia vera, gout, chronic pain/opioid dependence that presents to [facility] complaining of generalized weakness and mechanical fall.      COVID-pneumonia  -Completed antibiotics (cefepime 5 days)  -dexamethasone (to PO) 10 days or until dc  -Monitoring LFTs improved today. Elevated probably from COVID  -Some leukocytosis probably from steroid but also at risk for aspiration and procalcitonin is up today. Restart antibiotics treating potential aspiration. Discussed with pulmonology they are extending cefepime and starting metronidazole. They are ordering CXR and CT imaging to evaluate for effusion or potentially empyema  -Flow (L/min):  [2] 2 overnight and now currently on room air  -Continue supportive care  -Deconditioning d/t above    Dysphagia  -On tube feeds  -Reportedly was given water per wife earlier in admission  -SLP following for VFSS or FEES. Looks like he may need PEG    Atrial fibrillation history of ablation  CAD/CABG  Hypertension  Chest pain 11/02/23   -No chest pain currently. Will consult his cardiologist. Troponins pending. echo from 2021 above recheck  -Apixaban for anticoagulation  -BP acceptable    History of left MCA CVA-apixaban, statin on hold due to elevated LFTs  Hyperlipidemia-Crestor on hold due to elevated LFTs  Chronic pain  Polycythemia vera  Gout    Discussed with patient, family, and nursing staff and Dr. Daly    Expected Discharge Date: 11/7/2023;  Expected Discharge Time:     SNF planning    [facility] Moris Lawrence MD  [facility]  11/02/23

## 2023-11-03 ENCOUNTER — APPOINTMENT (OUTPATIENT)
Dept: CT IMAGING | Facility: HOSPITAL | Age: 78
End: 2023-11-03
Payer: MEDICARE

## 2023-11-03 ENCOUNTER — APPOINTMENT (OUTPATIENT)
Dept: GENERAL RADIOLOGY | Facility: HOSPITAL | Age: 78
End: 2023-11-03
Payer: MEDICARE

## 2023-11-03 LAB
ALBUMIN SERPL-MCNC: 2.5 G/DL (ref 3.5–5.2)
ALBUMIN/GLOB SERPL: 0.8 G/DL
ALP SERPL-CCNC: 145 U/L (ref 39–117)
ALT SERPL W P-5'-P-CCNC: 67 U/L (ref 1–41)
ANION GAP SERPL CALCULATED.3IONS-SCNC: 10.2 MMOL/L (ref 5–15)
AST SERPL-CCNC: 56 U/L (ref 1–40)
BILIRUB SERPL-MCNC: 0.6 MG/DL (ref 0–1.2)
BUN SERPL-MCNC: 42 MG/DL (ref 8–23)
BUN/CREAT SERPL: 50.6 (ref 7–25)
CALCIUM SPEC-SCNC: 8.1 MG/DL (ref 8.6–10.5)
CHLORIDE SERPL-SCNC: 100 MMOL/L (ref 98–107)
CO2 SERPL-SCNC: 23.8 MMOL/L (ref 22–29)
CREAT SERPL-MCNC: 0.83 MG/DL (ref 0.76–1.27)
DEPRECATED RDW RBC AUTO: 53.4 FL (ref 37–54)
EGFRCR SERPLBLD CKD-EPI 2021: 89.6 ML/MIN/1.73
ERYTHROCYTE [DISTWIDTH] IN BLOOD BY AUTOMATED COUNT: 13.1 % (ref 12.3–15.4)
GLOBULIN UR ELPH-MCNC: 3 GM/DL
GLUCOSE BLDC GLUCOMTR-MCNC: 120 MG/DL (ref 70–130)
GLUCOSE BLDC GLUCOMTR-MCNC: 132 MG/DL (ref 70–130)
GLUCOSE BLDC GLUCOMTR-MCNC: 219 MG/DL (ref 70–130)
GLUCOSE BLDC GLUCOMTR-MCNC: 235 MG/DL (ref 70–130)
GLUCOSE SERPL-MCNC: 250 MG/DL (ref 65–99)
HCT VFR BLD AUTO: 33.2 % (ref 37.5–51)
HGB BLD-MCNC: 11.1 G/DL (ref 13–17.7)
MCH RBC QN AUTO: 37.4 PG (ref 26.6–33)
MCHC RBC AUTO-ENTMCNC: 33.4 G/DL (ref 31.5–35.7)
MCV RBC AUTO: 111.8 FL (ref 79–97)
PLATELET # BLD AUTO: 187 10*3/MM3 (ref 140–450)
PMV BLD AUTO: 12.5 FL (ref 6–12)
POTASSIUM SERPL-SCNC: 4.6 MMOL/L (ref 3.5–5.2)
PROT SERPL-MCNC: 5.5 G/DL (ref 6–8.5)
RBC # BLD AUTO: 2.97 10*6/MM3 (ref 4.14–5.8)
SODIUM SERPL-SCNC: 134 MMOL/L (ref 136–145)
WBC NRBC COR # BLD: 16.63 10*3/MM3 (ref 3.4–10.8)

## 2023-11-03 PROCEDURE — 92610 EVALUATE SWALLOWING FUNCTION: CPT | Performed by: SPEECH-LANGUAGE PATHOLOGIST

## 2023-11-03 PROCEDURE — 63710000001 DEXAMETHASONE PER 0.25 MG: Performed by: HOSPITALIST

## 2023-11-03 PROCEDURE — 97110 THERAPEUTIC EXERCISES: CPT

## 2023-11-03 PROCEDURE — 85027 COMPLETE CBC AUTOMATED: CPT | Performed by: HOSPITALIST

## 2023-11-03 PROCEDURE — 71250 CT THORAX DX C-: CPT

## 2023-11-03 PROCEDURE — 99221 1ST HOSP IP/OBS SF/LOW 40: CPT | Performed by: SURGERY

## 2023-11-03 PROCEDURE — 25010000002 METRONIDAZOLE 500 MG/100ML SOLUTION: Performed by: INTERNAL MEDICINE

## 2023-11-03 PROCEDURE — 74230 X-RAY XM SWLNG FUNCJ C+: CPT

## 2023-11-03 PROCEDURE — 80053 COMPREHEN METABOLIC PANEL: CPT | Performed by: HOSPITALIST

## 2023-11-03 PROCEDURE — 82948 REAGENT STRIP/BLOOD GLUCOSE: CPT

## 2023-11-03 PROCEDURE — 97530 THERAPEUTIC ACTIVITIES: CPT

## 2023-11-03 PROCEDURE — 25010000002 CEFEPIME PER 500 MG: Performed by: INTERNAL MEDICINE

## 2023-11-03 PROCEDURE — 63710000001 INSULIN REGULAR HUMAN PER 5 UNITS: Performed by: HOSPITALIST

## 2023-11-03 RX ORDER — HYDROCODONE BITARTRATE AND ACETAMINOPHEN 5; 325 MG/1; MG/1
1 TABLET ORAL NIGHTLY
Status: DISCONTINUED | OUTPATIENT
Start: 2023-11-03 | End: 2023-11-09 | Stop reason: HOSPADM

## 2023-11-03 RX ORDER — DEXAMETHASONE 2 MG/1
3 TABLET ORAL
Status: DISCONTINUED | OUTPATIENT
Start: 2023-11-04 | End: 2023-11-04

## 2023-11-03 RX ORDER — HYDROCODONE BITARTRATE AND ACETAMINOPHEN 5; 325 MG/1; MG/1
1 TABLET ORAL EVERY 8 HOURS PRN
Status: DISCONTINUED | OUTPATIENT
Start: 2023-11-03 | End: 2023-11-09 | Stop reason: HOSPADM

## 2023-11-03 RX ADMIN — AMLODIPINE BESYLATE 5 MG: 5 TABLET ORAL at 09:01

## 2023-11-03 RX ADMIN — APIXABAN 5 MG: 5 TABLET, FILM COATED ORAL at 09:00

## 2023-11-03 RX ADMIN — DEXAMETHASONE 6 MG: 4 TABLET ORAL at 09:01

## 2023-11-03 RX ADMIN — BARIUM SULFATE 4 ML: 980 POWDER, FOR SUSPENSION ORAL at 09:49

## 2023-11-03 RX ADMIN — Medication 1 APPLICATION: at 17:31

## 2023-11-03 RX ADMIN — LANSOPRAZOLE 30 MG: 15 TABLET, ORALLY DISINTEGRATING ORAL at 09:02

## 2023-11-03 RX ADMIN — AMITRIPTYLINE HYDROCHLORIDE 50 MG: 50 TABLET, FILM COATED ORAL at 20:00

## 2023-11-03 RX ADMIN — BARIUM SULFATE 55 ML: 0.81 POWDER, FOR SUSPENSION ORAL at 09:49

## 2023-11-03 RX ADMIN — INSULIN HUMAN 4 UNITS: 100 INJECTION, SOLUTION PARENTERAL at 12:07

## 2023-11-03 RX ADMIN — HYDROCODONE BITARTRATE AND ACETAMINOPHEN 1 TABLET: 5; 325 TABLET ORAL at 20:00

## 2023-11-03 RX ADMIN — CEFEPIME 2000 MG: 2 INJECTION, POWDER, FOR SOLUTION INTRAVENOUS at 05:30

## 2023-11-03 RX ADMIN — HYDROCODONE BITARTRATE AND ACETAMINOPHEN 1 TABLET: 10; 325 TABLET ORAL at 05:31

## 2023-11-03 RX ADMIN — Medication 10 ML: at 20:00

## 2023-11-03 RX ADMIN — Medication 1 APPLICATION: at 20:00

## 2023-11-03 RX ADMIN — METRONIDAZOLE 500 MG: 500 INJECTION, SOLUTION INTRAVENOUS at 12:07

## 2023-11-03 RX ADMIN — Medication 1 APPLICATION: at 09:04

## 2023-11-03 RX ADMIN — CEFEPIME 2000 MG: 2 INJECTION, POWDER, FOR SOLUTION INTRAVENOUS at 12:07

## 2023-11-03 RX ADMIN — METRONIDAZOLE 500 MG: 500 INJECTION, SOLUTION INTRAVENOUS at 18:44

## 2023-11-03 RX ADMIN — BARIUM SULFATE 50 ML: 400 SUSPENSION ORAL at 09:49

## 2023-11-03 RX ADMIN — FUROSEMIDE 20 MG: 20 TABLET ORAL at 09:01

## 2023-11-03 RX ADMIN — METRONIDAZOLE 500 MG: 500 INJECTION, SOLUTION INTRAVENOUS at 05:30

## 2023-11-03 RX ADMIN — Medication 1 APPLICATION: at 12:08

## 2023-11-03 RX ADMIN — OXYBUTYNIN CHLORIDE 5 MG: 5 TABLET, EXTENDED RELEASE ORAL at 09:02

## 2023-11-03 RX ADMIN — INSULIN HUMAN 4 UNITS: 100 INJECTION, SOLUTION PARENTERAL at 17:31

## 2023-11-03 RX ADMIN — APIXABAN 5 MG: 5 TABLET, FILM COATED ORAL at 20:00

## 2023-11-03 RX ADMIN — SUCRALFATE 1 G: 1 TABLET ORAL at 09:03

## 2023-11-03 RX ADMIN — CEFEPIME 2000 MG: 2 INJECTION, POWDER, FOR SOLUTION INTRAVENOUS at 18:44

## 2023-11-03 RX ADMIN — SUCRALFATE 1 G: 1 TABLET ORAL at 17:31

## 2023-11-03 NOTE — PLAN OF CARE
"Goal Outcome Evaluation:  Plan of Care Reviewed With: patient           Outcome Evaluation: Note results of CT (2 hours post VFSS) showed barium remaining in the esophagus \"from just above the level of the veronica down into the stomach\" as well as \"small bright densities within the left lower lobe consolidation likely from aspiration of the barium\". Given these results suspect additional aspiration after the swallow for residue and/or possible backflow.         "

## 2023-11-03 NOTE — PLAN OF CARE
Goal Outcome Evaluation:           Progress: improving  Outcome Evaluation: Pt seen for OT co-tx d/t increased weakness and fatigue to increase participation w/ therapy. Pt tolerated therapy well despite being very weak and tired. Pt daughter present and encouraging. Pt MOD A x2 for supine to sit and  MAX A x2 for sit to supine. Pt tolerated 3x STS from EOB, 1st stand MAX x2 and 2nd stand slightly better Mod A x2 to prepare for BSC xfer.Pt sitting bal SBA while performing dyn reaching in all directions w/ min difficulty to prepare for seated ADL and fxnl tasks. Pt req cueing for postural correction to complete task. Pt is improving and DC rec to SNF. OT will continue to monitor.      Anticipated Discharge Disposition (OT): skilled nursing facility

## 2023-11-03 NOTE — THERAPY TREATMENT NOTE
Patient Name: Madhu Santoro  : 1945    MRN: [id number]                              Today's Date: 11/3/2023       Admit Date: 10/23/2023    Visit Dx:     ICD-10-CM ICD-9-CM   1. COVID-19  U07.1 079.89   2. Generalized weakness  R53.1 780.79   3. Fall at home, initial encounter  W19.XXXA E888.9    Y92.009 E849.0   4. Abrasion of left ear, initial encounter  S00.412A 910.0   5. Elevated troponin  R79.89 790.6     Patient Active Problem List   Diagnosis    Anxiety    Arthritis    Coronary artery disease due to lipid rich plaque    HLD (hyperlipidemia)    Benign essential hypertension    DDD (degenerative disc disease), lumbosacral    Cerebrovascular accident    Encounter for screening for cardiovascular disorders    Gastroesophageal reflux disease    Hyperlipidemia    Stenosis of carotid artery    Former smoker    History of cardiac catheterization    S/P ablation of atrial flutter    Typical atrial flutter    S/P CABG (coronary artery bypass graft)    Adenomatous polyp of ascending colon    Abdominal bloating    Stroke    PAD (peripheral artery disease)    Acute cholecystitis    Right upper quadrant abdominal pain    Chronic pain of both hips    Chronic bilateral low back pain without sciatica    Sacroiliac joint dysfunction of both sides    Encounter for long-term (current) use of high-risk medication    Lumbar facet arthropathy    Stroke-like symptoms    CVA (cerebral vascular accident)    Personal history of colonic polyps    Arthralgia of multiple joints    Iron deficiency    Thrombocytosis    Left ureteral stone    Obstructive uropathy    Chronic anticoagulation    Facial skin lesion    Iron deficiency anemia    Polycythemia    Neuropathy    Weakness    Pneumonia    Close exposure to COVID-19 virus    Polycythemia vera    Chronic gout without tophus    COVID-19     Past Medical History:   Diagnosis Date    Anxiety     Arthritis     Atrial flutter     Status post cavotricuspid isthmus ablation by   "[facility] on 4/18/17    Mandel esophagus     Benign essential hypertension     CAD (coronary artery disease)     3 vessel CABG 4/11/17 by Dr. Cortez: ROSARIO-prox LAD, SVG-OM1, SVG-OM3    Carotid artery disease     Status post carotid endarterectomy - USG 4/10/17: 50-59% NICHELLE, 1-15% LICA.     Colonic polyp     Cyst of pancreas     DDD (degenerative disc disease), lumbosacral     GERD (gastroesophageal reflux disease)     H/O bone density study 2013    H/O complete eye exam 2014    History of kidney stone     HLD (hyperlipidemia)     Hypertension     Kidney stone     8/22/22    Lipid screening 05/31/2013    Low back pain     physical therapy [facility] 5-12-10    Screening for prostate cancer 07/07/2015    Skin cancer     nose    Stroke     RESIDUAL--\"BALANCE ISSUES\"     Past Surgical History:   Procedure Laterality Date    CARDIAC CATHETERIZATION N/A 04/10/2017    Procedure: Left Heart Cath;  Surgeon: Marjorie Healy MD;  Location:  [facility] CATH INVASIVE [facility];  Service:     CARDIAC CATHETERIZATION N/A 04/10/2017    Procedure: Coronary angiography;  Surgeon: Marjorie Healy MD;  Location:  [facility];  Service:     CARDIAC CATHETERIZATION N/A 04/10/2017    Procedure: Left ventriculography;  Surgeon: Marjorie Healy MD;  [facility];  Service:     CARDIAC CATHETERIZATION  2011    CARDIAC ELECTROPHYSIOLOGY PROCEDURE N/A 04/18/2017    Procedure: Ablation atrial flutter;  Surgeon: Jose Antonio Gustafson MD;  Location:  [facility] CATH INVASIVE [facility]ATION;  Service:     CAROTID ENDARTERECTOMY      CHOLECYSTECTOMY      CHOLECYSTECTOMY WITH INTRAOPERATIVE CHOLANGIOGRAM N/A 09/07/2019    Procedure: Laparoscopic cholecystectomy with intraoperative cholangiogram;  Surgeon: Gauri Travis MD;  Location: [facility] MAIN OR;  Service: General    COLONOSCOPY  01/06/2015    Diverticulosis, one TA    COLONOSCOPY N/A 02/14/2019    tics, NBIH, adenomatous polyp x 2    COLONOSCOPY N/A 11/05/2021    " Procedure: COLONOSCOPY TO CECUM AND TERM. ILEUM WITH COLD POLYPECTOMIES;  Surgeon: Everton Abel MD;  Location: [facility] ENDOSCOPY;  Service: Gastroenterology;  Laterality: N/A;  PRE OP - PERS H/O POLYPS  POST OP - COLON POLYPS,, DIVERTICULOSIS, HEMORRHOIDS    CORONARY ARTERY BYPASS GRAFT N/A 04/11/2017    Procedure: AR STERNOTOMY CORONARY ARTERY BYPASS GRAFT TIMES 3 USING LEFT INTERNAL MAMMARY ARTERY AND LEFT GREATER SAPHENOUS VEIN GRAFT PER ENDOSCOPIC VEIN HARVESTING AND PRP ;  Surgeon: Temo Cortez MD;  Location: [facility] MAIN OR;  Service:     ENDOSCOPY  01/06/2015    HH, Ervin's esophagus    ENDOSCOPY N/A 02/14/2019    Z line irregular, HH, Ervin's esophagus    ENDOSCOPY N/A 11/05/2021    Procedure: ESOPHAGOGASTRODUODENOSCOPY WITH BIOPSIES;  Surgeon: Everton Abel MD;  Location: [facility] ENDOSCOPY;  Service: Gastroenterology;  Laterality: N/A;  PRE OP - PERS H/O ERVIN'S  POST OP - IRREG Z LINE    KNEE SURGERY Left     URETEROSCOPY LASER LITHOTRIPSY WITH STENT INSERTION Left 8/23/2022    Procedure: Cysto retrograde with left uretro stent placement;  Surgeon: Damien Oliveira MD;  Location: [facility] MAIN OR;  Service: Urology;  Laterality: Left;    VASECTOMY        General Information       Row Name 11/03/23 1534          Physical Therapy Time and Intention    Document Type therapy note (daily note)  -     Mode of Treatment physical therapy  -       Row Name 11/03/23 1534          General Information    Existing Precautions/Restrictions fall  -       Row Name 11/03/23 1534          Cognition    Orientation Status (Cognition) oriented x 3  -               User Key  (r) = Recorded By, (t) = Taken By, (c) = Cosigned By      Initials Name Provider Type     Trang Aburto PTA Physical Therapist Assistant                   Mobility       Row Name 11/03/23 1534          Bed Mobility    Bed Mobility supine-sit;sit-supine  -     Supine-Sit [place] (Bed Mobility) moderate assist (50%  patient effort)  -     Sit-Supine [place] (Bed Mobility) maximum assist (25% patient effort);2 person assist  -     Assistive Device (Bed Mobility) bed rails;head of bed elevated  -       Row Name 11/03/23 1534          Sit-Stand Transfer    Sit-Stand [place] (Transfers) maximum assist (25% patient effort);moderate assist (50% patient effort);2 person assist  -     Assistive Device (Sit-Stand Transfers) --  HHA  -     Comment, (Sit-Stand Transfer) 2 stands, slightly better on second trial; attempted side steps, though pt unable to achieve  -               User Key  (r) = Recorded By, (t) = Taken By, (c) = Cosigned By      Initials Name Provider Type    Trang Lagunas PTA Physical Therapist Assistant                   Obj/Interventions       Row Name 11/03/23 1537          Motor Skills    Therapeutic Exercise --  seated AP, LAQ, marches x10 reps  -               User Key  (r) = Recorded By, (t) = Taken By, (c) = Cosigned By      Initials Name Provider Type    Trang Lagunas PTA Physical Therapist Assistant                   Goals/Plan    No documentation.                  Clinical Impression       Row Name 11/03/23 1538          Pain    Pre/Posttreatment Pain Comment did not rate though c/o ongoing neuropathy pains in B LEs  -     Pain Intervention(s) Repositioned;Ambulation/increased activity;Rest  -       Row Name 11/03/23 1538          Positioning and Restraints    Pre-Treatment Position in bed  -     Post Treatment Position bed  -SM     In Bed supine;call light within reach;encouraged to call for assist;exit alarm on;with family/caregiver  -               User Key  (r) = Recorded By, (t) = Taken By, (c) = Cosigned By      Initials Name Provider Type    Trang Lagunas PTA Physical Therapist Assistant                   Outcome Measures       Row Name 11/03/23 1540          How much help from another person do you currently need...    Turning from your back  to your side while in flat bed without using bedrails? 3  -SM     Moving from lying on back to sitting on the side of a flat bed without bedrails? 2  -SM     Moving to and from a bed to a chair (including a wheelchair)? 1  -SM     Standing up from a chair using your arms (e.g., wheelchair, bedside chair)? 2  -SM     Climbing 3-5 steps with a railing? 1  -SM     To walk in hospital room? 1  -SM     AM-PAC 6 Clicks Score (PT) 10  -SM     Highest level of mobility 4 --> Transferred to chair/commode  -       Row Name 11/03/23 1540          Functional Assessment    Outcome Measure Options AM-PAC 6 Clicks Basic Mobility (PT)  -               User Key  (r) = Recorded By, (t) = Taken By, (c) = Cosigned By      Initials Name Provider Type    Trang Lagunas PTA Physical Therapist Assistant                                 Physical Therapy Education       Title: PT OT SLP Therapies (Done)       Topic: Physical Therapy (Done)       Point: Mobility training (Done)       Learning Progress Summary             Patient Acceptance, E,TB,D, VU,NR by  at 11/3/2023 1541    Acceptance, E,TB,D, VU,NR by  at 11/1/2023 1452    Acceptance, E, VU by [facility] at 10/30/2023 1139    Acceptance, E,D, DU by  at 10/26/2023 1649    Acceptance, E,TB,D, VU,NR by  at 10/24/2023 1535                         Point: Home exercise program (Done)       Learning Progress Summary             Patient Acceptance, E,TB,D, VU,NR by  at 11/3/2023 1541    Acceptance, E,TB,D, VU,NR by  at 11/1/2023 1452    Acceptance, E, VU by [facility] at 10/30/2023 1139    Acceptance, E,D, DU by PC at 10/26/2023 1649    Acceptance, E,TB,D, VU,NR by  at 10/24/2023 1535                         Point: Body mechanics (Done)       Learning Progress Summary             Patient Acceptance, E,TB,D, VU,NR by  at 11/3/2023 1541    Acceptance, E,TB,D, VU,NR by  at 11/1/2023 1452    Acceptance, E, VU by [facility] at 10/30/2023 1139    Acceptance, ANDREA RAMÍREZ DU by PC at 10/26/2023 4816     Acceptance, E,TB,D, VU,NR by  at 10/24/2023 1535                         Point: Precautions (Done)       Learning Progress Summary             Patient Acceptance, E,TB,D, VU,NR by  at 11/3/2023 1541    Acceptance, E,TB,D, VU,NR by  at 11/1/2023 1452    Acceptance, E, VU by [facility] at 10/30/2023 1139    Acceptance, E,D, DU by  at 10/26/2023 1649    Acceptance, E,TB,D, VU,NR by  at 10/24/2023 1535                                         User Key       Initials Effective Dates Name Provider Type Discipline     06/16/21 -  Blanca Elkins, PT Physical Therapist PT     06/16/21 -  Naomi Tucker, PT Physical Therapist PT     03/07/18 -  Trang Aburto PTA Physical Therapist Assistant PT    [facility] 05/02/22 -  Larisa Brito, PT Physical Therapist PT                  PT Recommendation and Plan     Plan of Care Reviewed With: patient  Progress: improving  Outcome Evaluation: Pt tolerated treatment fair this date. Required slightly less assist throughout session, limited overall d/t weakness and fatigue. Required mod A x1 for supine to sit, then maintained sitting balance w/ mostly SBA. Pt completed a few exercises while sitting EOB, then stood twice, requiring mod-max A x2. Unable to take side steps towards HOB today. Pt returned to supine w/ max A x2. Printed HEp given to pt w/ family present. Encouraged pt to attempt supine exercises during the day.     Time Calculation:         PT Charges       Row Name 11/03/23 1548             Time Calculation    Start Time 1446  -      Stop Time 1520  -      Time Calculation (min) 34 min  -      PT Received On 11/03/23  -      PT - Next Appointment 11/06/23  -                User Key  (r) = Recorded By, (t) = Taken By, (c) = Cosigned By      Initials Name Provider Type     Trang Aburto PTA Physical Therapist Assistant                  Therapy Charges for Today       Code Description Service Date Service Provider Modifiers Qty    30314175995  HC PT THERAPEUTIC ACT EA 15 MIN 11/3/2023 Lamonte Trang Lindsey, PTA GP 1    93914676616 HC PT THER PROC EA 15 MIN 11/3/2023 Trang Aburto, PTA GP 1    47266385152 HC PT THER SUPP EA 15 MIN 11/3/2023 Trang Aburto, PTA GP 2            PT G-Codes  Outcome Measure Options: AM-PAC 6 Clicks Basic Mobility (PT)  AM-PAC 6 Clicks Score (PT): 10  AM-PAC 6 Clicks Score (OT): 9  PT Discharge Summary  Anticipated Discharge Disposition (PT): skilled nursing facility    Trang Kernsemanuel Aburto PTA  11/3/2023

## 2023-11-03 NOTE — THERAPY TREATMENT NOTE
Patient Name: Madhu Santoro  : 1945    MRN: [id number]                              Today's Date: 11/3/2023       Admit Date: 10/23/2023    Visit Dx:     ICD-10-CM ICD-9-CM   1. COVID-19  U07.1 079.89   2. Generalized weakness  R53.1 780.79   3. Fall at home, initial encounter  W19.XXXA E888.9    Y92.009 E849.0   4. Abrasion of left ear, initial encounter  S00.412A 910.0   5. Elevated troponin  R79.89 790.6     Patient Active Problem List   Diagnosis    Anxiety    Arthritis    Coronary artery disease due to lipid rich plaque    HLD (hyperlipidemia)    Benign essential hypertension    DDD (degenerative disc disease), lumbosacral    Cerebrovascular accident    Encounter for screening for cardiovascular disorders    Gastroesophageal reflux disease    Hyperlipidemia    Stenosis of carotid artery    Former smoker    History of cardiac catheterization    S/P ablation of atrial flutter    Typical atrial flutter    S/P CABG (coronary artery bypass graft)    Adenomatous polyp of ascending colon    Abdominal bloating    Stroke    PAD (peripheral artery disease)    Acute cholecystitis    Right upper quadrant abdominal pain    Chronic pain of both hips    Chronic bilateral low back pain without sciatica    Sacroiliac joint dysfunction of both sides    Encounter for long-term (current) use of high-risk medication    Lumbar facet arthropathy    Stroke-like symptoms    CVA (cerebral vascular accident)    Personal history of colonic polyps    Arthralgia of multiple joints    Iron deficiency    Thrombocytosis    Left ureteral stone    Obstructive uropathy    Chronic anticoagulation    Facial skin lesion    Iron deficiency anemia    Polycythemia    Neuropathy    Weakness    Pneumonia    Close exposure to COVID-19 virus    Polycythemia vera    Chronic gout without tophus    COVID-19     Past Medical History:   Diagnosis Date    Anxiety     Arthritis     Atrial flutter     Status post cavotricuspid isthmus ablation by   "[facility] on 4/18/17    Mandel esophagus     Benign essential hypertension     CAD (coronary artery disease)     3 vessel CABG 4/11/17 by Dr. Cortez: ROSARIO-prox LAD, SVG-OM1, SVG-OM3    Carotid artery disease     Status post carotid endarterectomy - USG 4/10/17: 50-59% NICHELLE, 1-15% LICA.     Colonic polyp     Cyst of pancreas     DDD (degenerative disc disease), lumbosacral     GERD (gastroesophageal reflux disease)     H/O bone density study 2013    H/O complete eye exam 2014    History of kidney stone     HLD (hyperlipidemia)     Hypertension     Kidney stone     8/22/22    Lipid screening 05/31/2013    Low back pain     physical therapy [facility] 5-12-10    Screening for prostate cancer 07/07/2015    Skin cancer     nose    Stroke     RESIDUAL--\"BALANCE ISSUES\"     Past Surgical History:   Procedure Laterality Date    CARDIAC CATHETERIZATION N/A 04/10/2017    Procedure: Left Heart Cath;  Surgeon: Marjorie Healy MD;  Location:  [facility] CATH INVASIVE [facility];  Service:     CARDIAC CATHETERIZATION N/A 04/10/2017    Procedure: Coronary angiography;  Surgeon: Marjorie Healy MD;  Location:  [facility];  Service:     CARDIAC CATHETERIZATION N/A 04/10/2017    Procedure: Left ventriculography;  Surgeon: Marjorie Healy MD;  Location: [facility] CATH [facility]ASIVE [facility];  Service:     CARDIAC CATHETERIZATION  2011    CARDIAC ELECTROPHYSIOLOGY PROCEDURE N/A 04/18/2017    Procedure: Ablation atrial flutter;  Surgeon: Jose Antonio Gustafson MD;  Location:  [facility] CATH INVASIVE [facility]ATION;  Service:     CAROTID ENDARTERECTOMY      CHOLECYSTECTOMY      CHOLECYSTECTOMY WITH INTRAOPERATIVE CHOLANGIOGRAM N/A 09/07/2019    Procedure: Laparoscopic cholecystectomy with intraoperative cholangiogram;  Surgeon: Gauri Travis MD;  Location: [facility] MAIN OR;  Service: General    COLONOSCOPY  01/06/2015    Diverticulosis, one TA    COLONOSCOPY N/A 02/14/2019    tics, NBIH, adenomatous polyp x 2    COLONOSCOPY N/A 11/05/2021    " Procedure: COLONOSCOPY TO CECUM AND TERM. ILEUM WITH COLD POLYPECTOMIES;  Surgeon: Everton Abel MD;  Location: [facility] ENDOSCOPY;  Service: Gastroenterology;  Laterality: N/A;  PRE OP - PERS H/O POLYPS  POST OP - COLON POLYPS,, DIVERTICULOSIS, HEMORRHOIDS    CORONARY ARTERY BYPASS GRAFT N/A 04/11/2017    Procedure: AR STERNOTOMY CORONARY ARTERY BYPASS GRAFT TIMES 3 USING LEFT INTERNAL MAMMARY ARTERY AND LEFT GREATER SAPHENOUS VEIN GRAFT PER ENDOSCOPIC VEIN HARVESTING AND PRP ;  Surgeon: Temo Cortez MD;  Location: [facility] MAIN OR;  Service:     ENDOSCOPY  01/06/2015    HH, Ervin's esophagus    ENDOSCOPY N/A 02/14/2019    Z line irregular, HH, Ervin's esophagus    ENDOSCOPY N/A 11/05/2021    Procedure: ESOPHAGOGASTRODUODENOSCOPY WITH BIOPSIES;  Surgeon: Everton Abel MD;  Location: [facility] ENDOSCOPY;  Service: Gastroenterology;  Laterality: N/A;  PRE OP - PERS H/O ERVIN'S  POST OP - IRREG Z LINE    KNEE SURGERY Left     URETEROSCOPY LASER LITHOTRIPSY WITH STENT INSERTION Left 8/23/2022    Procedure: Cysto retrograde with left uretro stent placement;  Surgeon: Damien Oliveira MD;  Location: [facility] MAIN OR;  Service: Urology;  Laterality: Left;    VASECTOMY        General Information       Row Name 11/03/23 1554          OT Time and Intention    Document Type therapy note (daily note)  -PP     Mode of Treatment occupational therapy  -PP       Row Name 11/03/23 1559          General Information    Patient Profile Reviewed yes  -PP     Existing Precautions/Restrictions fall  -PP       Row Name 11/03/23 1884          Cognition    Orientation Status (Cognition) oriented x 3  -PP       Row Name 11/03/23 1551          Safety Issues, Functional Mobility    Impairments Affecting Function (Mobility) balance;coordination;endurance/activity tolerance;strength;shortness of breath  -PP     Comment, Safety Issues/Impairments (Mobility) non skid socks and gait belt worn for safety  -PP               User Key   (r) = Recorded By, (t) = Taken By, (c) = Cosigned By      Initials Name Provider Type    PP Arleen Nava OT Occupational Therapist                     Mobility/ADL's       Row Name 11/03/23 1554          Bed Mobility    Bed Mobility supine-sit;sit-supine  -PP     Supine-Sit [place] (Bed Mobility) moderate assist (50% patient effort)  -PP     Sit-Supine [place] (Bed Mobility) maximum assist (25% patient effort);2 person assist  -PP     Assistive Device (Bed Mobility) bed rails;head of bed elevated  -PP     Comment, (Bed Mobility) Pt demo'd ability to scoot a few times towards HOB.  -PP       Row Name 11/03/23 1554          Transfers    Transfers sit-stand transfer;stand-sit transfer  -PP       Row Name 11/03/23 1554          Sit-Stand Transfer    Sit-Stand [place] (Transfers) maximum assist (25% patient effort);moderate assist (50% patient effort);2 person assist  -PP     Comment, (Sit-Stand Transfer) 3x STS from EOB, 1st stand MAX x2 and 2nd stand slightly better Mod A x2  -PP       Row Name 11/03/23 1554          Functional Mobility    Functional Mobility- Ind. Level not tested;not appropriate to assess  -PP       Row Name 11/03/23 1554          Activities of Daily Living    BADL Assessment/Intervention grooming  -PP       Row Name 11/03/23 1554          Grooming Assessment/Training    [place] Level (Grooming) maximum assist (25% patient effort);grooming skills;wash face, hands;hair care, combing/brushing  -PP     Comment, (Grooming) Pt daughter assisting w/ dry shampoo and grooming tasks following therapy session d/t fatigue.  -PP               User Key  (r) = Recorded By, (t) = Taken By, (c) = Cosigned By      Initials Name Provider Type    PP Arleen Nava OT Occupational Therapist                   Obj/Interventions       Row Name 11/03/23 1602          Shoulder (Therapeutic Exercise)    Shoulder (Therapeutic Exercise) AROM (active range of motion)  -PP     Shoulder AROM  (Therapeutic Exercise) bilateral;flexion;extension;scapular elevation;10 repetitions  -PP       Row Name 11/03/23 1602          Motor Skills    Motor Skills coordination  Pt tolerated dyn reaching seated in all directions w/ min difficulty to prepare for seated ADL and fxnl tasks.  -PP     Therapeutic Exercise shoulder  -PP       Row Name 11/03/23 1602          Balance    Static Sitting Balance standby assist  -PP     Position, Sitting Balance unsupported;sitting edge of bed  -PP     Balance Interventions dynamic reaching;sitting;static  -PP     Comment, Balance Pt had R pos lean, req VC/TC to correct at times while participating in dyn fxnl reaching tasks in all directions.  -PP               User Key  (r) = Recorded By, (t) = Taken By, (c) = Cosigned By      Initials Name Provider Type    Arleen Ray OT Occupational Therapist                   Goals/Plan    No documentation.                  Clinical Impression       Row Name 11/03/23 1603          Pain Assessment    Pretreatment Pain Rating 0/10 - no pain  -PP     Posttreatment Pain Rating 0/10 - no pain  -PP     Pre/Posttreatment Pain Comment Pt reported feeling weak but no pain  -PP       Row Name 11/03/23 1603          Plan of Care Review    Progress improving  -PP     Outcome Evaluation Pt seen for OT co-tx d/t increased weakness and fatigue to increase participation w/ therapy. Pt tolerated therapy well despite being very weak and tired. Pt daughter present and encouraging. Pt MOD A x2 for supine to sit and  MAX A x2 for sit to supine. Pt tolerated 3x STS from EOB, 1st stand MAX x2 and 2nd stand slightly better Mod A x2 to prepare for BSC xfer.Pt sitting bal SBA while performing dyn reaching in all directions w/ min difficulty to prepare for seated ADL and fxnl tasks. Pt req cueing for postural correction to complete task. Pt is improving and DC rec to SNF. OT will continue to monitor.  -PP       Row Name 11/03/23 5933          Therapy Plan  Review/Discharge Plan (OT)    Anticipated Discharge Disposition (OT) skilled nursing facility  -PP       Row Name 11/03/23 1603          Vital Signs    O2 Delivery Pre Treatment room air  -PP     O2 Delivery Intra Treatment room air  -PP     O2 Delivery Post Treatment room air  -PP     Pre Patient Position Supine  -PP     Intra Patient Position Standing  -PP     Post Patient Position Supine  -PP       Row Name 11/03/23 1603          Positioning and Restraints    Pre-Treatment Position in bed  -PP     Post Treatment Position bed  -PP     In Bed call light within reach;exit alarm on;encouraged to call for assist;notified nsg;sitting;with family/caregiver  -PP               User Key  (r) = Recorded By, (t) = Taken By, (c) = Cosigned By      Initials Name Provider Type    PP Arleen Nava, MEHDI Occupational Therapist                   Outcome Measures       Row Name 11/03/23 1604          How much help from another is currently needed...    Putting on and taking off regular lower body clothing? 1  -PP     Bathing (including washing, rinsing, and drying) 1  -PP     Toileting (which includes using toilet bed pan or urinal) 1  -PP     Putting on and taking off regular upper body clothing 2  -PP     Taking care of personal grooming (such as brushing teeth) 2  -PP     Eating meals 2  -PP     AM-PAC 6 Clicks Score (OT) 9  -PP       Row Name 11/03/23 1540          How much help from another person do you currently need...    Turning from your back to your side while in flat bed without using bedrails? 3  -SM     Moving from lying on back to sitting on the side of a flat bed without bedrails? 2  -SM     Moving to and from a bed to a chair (including a wheelchair)? 1  -SM     Standing up from a chair using your arms (e.g., wheelchair, bedside chair)? 2  -SM     Climbing 3-5 steps with a railing? 1  -SM     To walk in hospital room? 1  -SM     AM-PAC 6 Clicks Score (PT) 10  -SM     Highest level of mobility 4 --> Transferred  to chair/commode  -       Row Name 11/03/23 1604 11/03/23 1540       Functional Assessment    Outcome Measure Options AM-PAC 6 Clicks Daily Activity (OT)  -PP AM-PAC 6 Clicks Basic Mobility (PT)  -              User Key  (r) = Recorded By, (t) = Taken By, (c) = Cosigned By      Initials Name Provider Type    Trang Lagunas, PTA Physical Therapist Assistant    Arleen Ray OT Occupational Therapist                    Occupational Therapy Education       Title: PT OT SLP Therapies (Done)       Topic: Occupational Therapy (Done)       Point: ADL training (Done)       Description:   Instruct learner(s) on proper safety adaptation and remediation techniques during self care or transfers.   Instruct in proper use of assistive devices.                  Learning Progress Summary             Patient Acceptance, E,TB, VU by  at 10/24/2023 7927    Comment: Patient instructed in role of OT, covid precautions, fall prevention, and discharge planning                         Point: Home exercise program (Done)       Description:   Instruct learner(s) on appropriate technique for monitoring, assisting and/or progressing therapeutic exercises/activities.                  Learning Progress Summary             Patient Acceptance, E,TB, VU by  at 10/24/2023 6277    Comment: Patient instructed in role of OT, covid precautions, fall prevention, and discharge planning                         Point: Precautions (Done)       Description:   Instruct learner(s) on prescribed precautions during self-care and functional transfers.                  Learning Progress Summary             Patient Acceptance, E,TB, VU by  at 10/24/2023 6967    Comment: Patient instructed in role of OT, covid precautions, fall prevention, and discharge planning                         Point: Body mechanics (Done)       Description:   Instruct learner(s) on proper positioning and spine alignment during self-care, functional mobility  activities and/or exercises.                  Learning Progress Summary             Patient Acceptance, E,TB, VU by  at 10/24/2023 6109    Comment: Patient instructed in role of OT, covid precautions, fall prevention, and discharge planning                                         User Key       Initials Effective Dates Name Provider Type Discipline     08/31/23 -  Alissa Gomes, MEHDI Occupational Therapist OT                  OT Recommendation and Plan     Plan of Care Review  Progress: improving  Outcome Evaluation: Pt seen for OT co-tx d/t increased weakness and fatigue to increase participation w/ therapy. Pt tolerated therapy well despite being very weak and tired. Pt daughter present and encouraging. Pt MOD A x2 for supine to sit and  MAX A x2 for sit to supine. Pt tolerated 3x STS from EOB, 1st stand MAX x2 and 2nd stand slightly better Mod A x2 to prepare for BSC xfer.Pt sitting bal SBA while performing dyn reaching in all directions w/ min difficulty to prepare for seated ADL and fxnl tasks. Pt req cueing for postural correction to complete task. Pt is improving and DC rec to SNF. OT will continue to monitor.     Time Calculation:         Time Calculation- OT       Row Name 11/03/23 1604             Time Calculation- OT    OT Start Time 1447  -PP      OT Stop Time 1521  -PP      OT Time Calculation (min) 34 min  -PP      Total Timed Code Minutes- OT 34 minute(s)  -PP      OT Received On 11/03/23  -PP      OT - Next Appointment 11/06/23  -PP         Timed Charges    18773 - OT Therapeutic Exercise Minutes 21  -PP      85325 - OT Therapeutic Activity Minutes 13  -PP         Total Minutes    Timed Charges Total Minutes 34  -PP       Total Minutes 34  -PP                User Key  (r) = Recorded By, (t) = Taken By, (c) = Cosigned By      Initials Name Provider Type    PP Arleen Nava, OT Occupational Therapist                  Therapy Charges for Today       Code Description Service Date Service Provider  Modifiers Qty    99482253424  OT THER PROC EA 15 MIN 11/3/2023 Arleen Nava, OT GO 1    [id number]  OT THERAPEUTIC ACT EA 15 MIN 11/3/2023 Arleen Nava, OT GO 1                 Arleen [place], OT  11/3/2023

## 2023-11-03 NOTE — PROGRESS NOTES
I spoke with CLAUDIO Wynne and she recommended we hold on consult at this time. We will check back in on Monday to see how patient is doing.

## 2023-11-03 NOTE — PROGRESS NOTES
"  Daily Progress Note.   [address]  11/3/2023    Patient:  Name:  Madhu Santoro  MRN:  [id number]  1945  77 y.o.  male         CC: covid [facility]    Interval History:  Afebrile on room air no worsening tachypnea respiratory 20 blood pressure stable.  No worsening tachycardia.  CT evaluation shows moderate to severe oral dysphagia  Patient looks better today he is more awake alert.  Still soft-spoken but answers questions appropriately denies any worsening abdominal pain or shortness of breath today.  Family member in room helping cleaning him up.  She agrees to look stronger today.    Physical Exam:  /64 (BP Location: Right arm, Patient Position: Lying)   Pulse 87   Temp 98.1 °F (36.7 °C) (Oral)   Resp 20   Ht 172.7 cm (68\")   Wt 65.4 kg (144 lb 2.9 oz)   SpO2 96%   BMI 21.92 kg/m²   Body mass index is 21.92 kg/m².    Intake/Output Summary (Last 24 hours) at 11/3/2023 1521  Last data filed at 11/3/2023 1207  Gross per 24 hour   Intake 340 ml   Output 3 ml   Net 337 ml     General appearance: ill weak appearing, conversant   Eyes: anicteric sclerae, moist conjunctivae; no lidlag;    HENT: Atraumatic; oropharynx +cortrak  Lungs: No significant rhonchi no wheeze, with normal respiratory effort and no intercostal retractions  CV: RRR, no rub   Abdomen: Soft, non rigid; BS+  Skin: WWP no diffuse visible rash  Psych: calm affect, alert   Neuro: Moves all ext. Grossly CN II-XII. Speech soft however easier to understand today    Data Review:  Notable Labs:  Results from last 7 days   Lab Units 11/03/23  0738 11/02/23  0854 11/01/23  1211 10/31/23  0541   WBC 10*3/mm3 16.63* 17.16* 17.36* 19.35*   HEMOGLOBIN g/dL 11.1* 11.9* 11.9* 13.6   PLATELETS 10*3/mm3 187 159 143 182     Results from last 7 days   Lab Units 11/03/23  1336 11/02/23  0854 11/01/23  1211 10/31/23  0541 10/30/23  0648 10/28/23  0706   SODIUM mmol/L 134* 134* 137 132*  --   --    POTASSIUM mmol/L 4.6 4.3 4.1 4.6 4.1  " --    CHLORIDE mmol/L 100 100 102 101  --   --    CO2 mmol/L 23.8 24.0 26.9 21.0*  --   --    BUN mg/dL 42* 31* 31* 29*  --   --    CREATININE mg/dL 0.83 0.73* 0.83 0.84  --   --    GLUCOSE mg/dL 250* 263* 183* 184*  --   --    CALCIUM mg/dL 8.1* 8.5* 8.6 8.6  --   --    MAGNESIUM mg/dL  --  2.1  --   --  2.2  --    PHOSPHORUS mg/dL  --   --   --   --  2.3* 2.0*   Estimated Creatinine Clearance: 67.9 mL/min (by C-G formula based on SCr of 0.83 mg/dL).    Results from last 7 days   Lab Units 11/03/23  1336 11/03/23  0738 11/02/23  0854 11/01/23  1211   AST (SGOT) U/L 56*  --  38 36   ALT (SGPT) U/L 67*  --  50* 46*   PROCALCITONIN ng/mL  --   --  0.76*  --    PLATELETS 10*3/mm3  --  187 159 143               Imaging:  Reviewed chest images personally from past 3 days    ASSESSMENT  /  PLAN:  COVID 19 pneumonia  Bacterial Pneumonia  Dysphagia  AFib    CAD s/p CABG  HTN  Chronic Pain  Small pleural effusion left    Remdesivir limited by transaminits - improving  Cont decadron for 10 days or hospital dc    Dysphagia plans noted.  Airway clr challenging with pt weakness overall  Lansoprazole.    CT scan showing no concern for empyema pleural effusion parapneumonic effusion.  Dense consolidations consistent with bacterial pneumonia that we have been treating.  Strong suspect that given his dysphagia this is from recurrent aspiration.  Given his overall weakness this is very concerning.    Discussed with the patient and his family member at bedside today.    Electronically signed by Albert Daly MD, 11/03/23, 5:00 PM EDT.

## 2023-11-03 NOTE — PROGRESS NOTES
Name: Mdahu Santoro ADMIT: 10/23/2023   : 1945  PCP: Damian Sanchez MD    MRN: [id number] LOS: 9 days   AGE/SEX: 77 y.o. male  ROOM: [facility]     Subjective   Subjective   Chief Complaint   Patient presents with    Fall     He is having cough intermittently.  Not reporting shortness of breath.  Currently on room air.  NG in place and he is using suction.  He reports he has some discomfort both with coughing and also peripheral aches and pains.  He is not reporting any nausea vomiting or abdominal pain currently.  No dysuria reported.  Discussed with family at bedside and over the phone.  He takes Norco 3 times a day at home regularly and has been doing so for some time due to arthritis and neuropathy.  He was very active prior to this most recent admission per family report.  Discussed with them and they would like to have his home opiates scheduled.  I discussed respiratory depression issues and that I would be okay with a smaller dose of nighttime since he was taking it 3 times a day.  Also discussed that is available during the day and I would continue to help monitor his pain with nursing.  They were also concerned about him not moving much or ambulating.  Discussed continued PT and we could try to at least get him sitting up to chair.  Discussed the repeat swallow eval and recommendation for continued alternative feeding.  They do want to make sure he is getting nutrition and we discussed the potential for PEG evaluation.  They are open to evaluating for that and discussing with surgery.     Objective   Objective   Vital Signs  Temp:  [97.7 °F (36.5 °C)-98.1 °F (36.7 °C)] 98.1 °F (36.7 °C)  Heart Rate:  [87-95] 87  Resp:  [20-22] 20  BP: (127-134)/(60-77) 134/64  SpO2:  [94 %-96 %] 96 %  on   ;   Device (Oxygen Therapy): room air  Body mass index is 21.92 kg/m².    Physical Exam  Vitals and nursing note reviewed.   Constitutional:       General: He is not in acute distress.     Appearance: He is  ill-appearing. He is not diaphoretic.   HENT:      Head: Atraumatic.      Nose:      Comments: NG  Cardiovascular:      Rate and Rhythm: Normal rate and regular rhythm.      Pulses: Normal pulses.   Pulmonary:      Effort: Pulmonary effort is normal.      Breath sounds: Rhonchi present. No wheezing.   Abdominal:      General: There is no distension.      Palpations: Abdomen is soft.      Tenderness: There is no abdominal tenderness. There is no guarding or rebound.   Musculoskeletal:         General: No tenderness.      Right lower leg: No edema.      Left lower leg: No edema.   Skin:     General: Skin is warm and dry.   Neurological:      Mental Status: He is alert.      Cranial Nerves: No cranial nerve deficit.      Motor: Weakness present.   Psychiatric:         Mood and Affect: Mood normal.         Behavior: Behavior is withdrawn.       Results Review  I reviewed the patient's new clinical results.    Results from last 7 days   Lab Units 11/03/23  0738 11/02/23  0854 11/01/23  1211 10/31/23  0541   WBC 10*3/mm3 16.63* 17.16* 17.36* 19.35*   HEMOGLOBIN g/dL 11.1* 11.9* 11.9* 13.6   PLATELETS 10*3/mm3 187 159 143 182     Results from last 7 days   Lab Units 11/03/23  1336 11/02/23  0854 11/01/23  1211 10/31/23  0541   SODIUM mmol/L 134* 134* 137 132*   POTASSIUM mmol/L 4.6 4.3 4.1 4.6   CHLORIDE mmol/L 100 100 102 101   CO2 mmol/L 23.8 24.0 26.9 21.0*   BUN mg/dL 42* 31* 31* 29*   CREATININE mg/dL 0.83 0.73* 0.83 0.84   GLUCOSE mg/dL 250* 263* 183* 184*   EGFR mL/min/1.73 89.6 93.1 89.6 89.3     Results from last 7 days   Lab Units 11/03/23  1336 11/02/23  0854 11/01/23  1211 10/31/23  0541   ALBUMIN g/dL 2.5* 2.5* 2.8* 2.9*   BILIRUBIN mg/dL 0.6 0.5 0.6 0.6   ALK PHOS U/L 145* 112 123* 147*   AST (SGOT) U/L 56* 38 36 52*   ALT (SGPT) U/L 67* 50* 46* 69*     Results from last 7 days   Lab Units 11/03/23  1336 11/02/23  0854 11/01/23  1211 10/31/23  0541 10/30/23  0648 10/28/23  0706   CALCIUM mg/dL 8.1* 8.5* 8.6  8.6  --   --    ALBUMIN g/dL 2.5* 2.5* 2.8* 2.9*  --   --    MAGNESIUM mg/dL  --  2.1  --   --  2.2  --    PHOSPHORUS mg/dL  --   --   --   --  2.3* 2.0*     Results from last 7 days   Lab Units 11/02/23  0854   PROCALCITONIN ng/mL 0.76*     Glucose   Date/Time Value Ref Range Status   11/03/2023 1148 219 (H) 70 - 130 mg/dL Final   11/03/2023 0555 120 70 - 130 mg/dL Final   11/03/2023 0029 132 (H) 70 - 130 mg/dL Final   11/02/2023 1741 318 (H) 70 - 130 mg/dL Final   11/02/2023 1219 268 (H) 70 - 130 mg/dL Final   11/02/2023 0547 285 (H) 70 - 130 mg/dL Final   11/02/2023 0025 195 (H) 70 - 130 mg/dL Final       XR Chest 1 View    Result Date: 11/2/2023  No significant change.  This report was finalized on 11/2/2023 12:37 PM by Dr. Mukesh Bruce M.D on Workstation: [id number]       I have personally reviewed all medications:  Scheduled Medications  amitriptyline, 50 mg, Oral, Nightly  amLODIPine, 5 mg, Oral, Daily  apixaban, 5 mg, Oral, BID  cefepime, 2,000 mg, Intravenous, Q8H  [START ON 11/4/2023] dexAMETHasone, 3 mg, Oral, Daily With Breakfast  furosemide, 20 mg, Oral, Daily  HYDROcodone-acetaminophen, 1 tablet, Oral, Nightly  insulin regular, 2-9 Units, Subcutaneous, Q6H  lansoprazole, 30 mg, Nasogastric, QAM AC  Menthol-Zinc Oxide, 1 application , Topical, 4x Daily  metroNIDAZOLE, 500 mg, Intravenous, Q8H  oxybutynin XL, 5 mg, Oral, Daily  sucralfate, 1 g, Oral, BID AC      Infusions     Diet  NPO Diet NPO Type: Strict NPO    I have personally reviewed:  [x]  Laboratory   [x]  Microbiology   [x]  Radiology   [x]  EKG/Telemetry  [x]  Cardiology/Vascular   []  Pathology    []  Records       Assessment/Plan     Active Hospital Problems    Diagnosis  POA    **COVID-19 [U07.1]  Yes    Polycythemia [D75.1]  Yes    Chronic anticoagulation [Z79.01]  Not Applicable    PAD (peripheral artery disease) [I73.9]  Yes    Stenosis of carotid artery [I65.29]  Yes    S/P ablation of atrial flutter [Z98.890, Z86.79]  Not Applicable     S/P CABG (coronary artery bypass graft) [Z95.1]  Not Applicable    Coronary artery disease due to lipid rich plaque [I25.10, I25.83]  Yes    HLD (hyperlipidemia) [E78.5]  Yes    Benign essential hypertension [I10]  Yes    Cerebrovascular accident [I63.9]  Yes      Resolved Hospital Problems   No resolved problems to display.       77 y.o. male admitted with COVID-19.    COVID-19 and aspiration pneumonia: He completed initial antibiotic course and this has been resumed with cefepime and Flagyl.  He does have bilateral pneumonia.   He received 3 doses of remdesivir this admission.  It was stopped due to elevating LFTs.  He has remained on dexamethasone.  Since he is on room air now, will wean the steroid and monitor for any bronchospasm.  Pulmonology following.  Acute hypoxic respiratory failure resolved  Dysphagia: Had aspiration during the VFSS.  Consult surgery.  He continues on tube feeds per core track.  Atrial fibrillation/history of ablation/CAD: Cardiology evaluated . continuing medical management including Eliquis.  Regular rhythm with PVC on telemetry.  Hypertension: Acceptable acutely.  Continue current regimen  History of stroke: Remains on Eliquis.  Statin held due to elevated LFT.  LFTs are improving.  HLD: See above  Chronic pain: After discussion with family we will try a nightly dose of Norco and monitor his progression.  As needed available.  Polycythemia: Hydrea was stopped.  Patient does not have elevated hemoglobin currently.  Hyperglycemia on steroid: Check A1c.  Continue regular insulin with tube feeds.  Weaning steroid as above.  Deconditioning: Continue PT .  PPX: AC as above  Disposition: SNF/TBD    Expected Discharge Date: 11/8/2023; Expected Discharge Time:      Michael Griffin MD  [facility]  11/03/23  15:27 EDT    Dictated portions of note using Dragon dictation software.  Copied text in this note has been reviewed by me and remains accurate as of 11/03/23

## 2023-11-03 NOTE — MBS/VFSS/FEES
Acute Care - Speech Language Pathology   Swallow Initial Evaluation [facility]     Patient Name: Madhu Santoro  : 1945  MRN: [id number]  Today's Date: 11/3/2023               Admit Date: 10/23/2023    Visit Dx:     ICD-10-CM ICD-9-CM   1. COVID-19  U07.1 079.89   2. Generalized weakness  R53.1 780.79   3. Fall at home, initial encounter  W19.XXXA E888.9    Y92.009 E849.0   4. Abrasion of left ear, initial encounter  S00.412A 910.0   5. Elevated troponin  R79.89 790.6     Patient Active Problem List   Diagnosis    Anxiety    Arthritis    Coronary artery disease due to lipid rich plaque    HLD (hyperlipidemia)    Benign essential hypertension    DDD (degenerative disc disease), lumbosacral    Cerebrovascular accident    Encounter for screening for cardiovascular disorders    Gastroesophageal reflux disease    Hyperlipidemia    Stenosis of carotid artery    Former smoker    History of cardiac catheterization    S/P ablation of atrial flutter    Typical atrial flutter    S/P CABG (coronary artery bypass graft)    Adenomatous polyp of ascending colon    Abdominal bloating    Stroke    PAD (peripheral artery disease)    Acute cholecystitis    Right upper quadrant abdominal pain    Chronic pain of both hips    Chronic bilateral low back pain without sciatica    Sacroiliac joint dysfunction of both sides    Encounter for long-term (current) use of high-risk medication    Lumbar facet arthropathy    Stroke-like symptoms    CVA (cerebral vascular accident)    Personal history of colonic polyps    Arthralgia of multiple joints    Iron deficiency    Thrombocytosis    Left ureteral stone    Obstructive uropathy    Chronic anticoagulation    Facial skin lesion    Iron deficiency anemia    Polycythemia    Neuropathy    Weakness    Pneumonia    Close exposure to COVID-19 virus    Polycythemia vera    Chronic gout without tophus    COVID-19     Past Medical History:   Diagnosis Date    Anxiety     Arthritis     Atrial  "flutter     Status post cavotricuspid isthmus ablation by Dr. Gustafson on 4/18/17    Mandel esophagus     Benign essential hypertension     CAD (coronary artery disease)     3 vessel CABG 4/11/17 by Dr. Cortez: ROSARIO-prox LAD, SVG-OM1, SVG-OM3    Carotid artery disease     Status post carotid endarterectomy - USG 4/10/17: 50-59% NICHELLE, 1-15% LICA.     Colonic polyp     Cyst of pancreas     DDD (degenerative disc disease), lumbosacral     GERD (gastroesophageal reflux disease)     H/O bone density study 2013    H/O complete eye exam 2014    History of kidney stone     HLD (hyperlipidemia)     Hypertension     Kidney stone     8/22/22    Lipid screening 05/31/2013    Low back pain     physical therapy [facility] 5-12-10    Screening for prostate cancer 07/07/2015    Skin cancer     nose    Stroke     RESIDUAL--\"BALANCE ISSUES\"     Past Surgical History:   Procedure Laterality Date    CARDIAC CATHETERIZATION N/A 04/10/2017    Procedure: Left Heart Cath;  Surgeon: Marjorie Healy MD;  Location[facility] C[facility]VASIVE LOC[facility];  Service:     CARDIAC CATHETERIZATION N/A 04/10/2017    Procedure: Coronary angiography;  Surgeon: Marjorie Healy MD;  Location: [facility] CATH [facility]VASIVE LOC[facility];  Service:     CARDIAC CATHETERIZATION N/A 04/10/2017    Procedure: Left ventriculography;  Surgeon: Marjorie Healy MD;  Location: [facility] CATH [facility]VASIVE LOC[facility];  Service:     CARDIAC CATHETERIZATION  2011    CARDIAC ELECTROPHYSIOLOGY PROCEDURE N/A 04/18/2017    Procedure: Ablation atrial flutter;  Surgeon: Jose Antonio Gustafson MD;  Location: [facility] CATH [facility]VASIVE LOC[facility];  Service:     CAROTID ENDARTERECTOMY      CHOLECYSTECTOMY      CHOLECYSTECTOMY WITH INTRAOPERATIVE CHOLANGIOGRAM N/A 09/07/2019    Procedure: Laparoscopic cholecystectomy with intraoperative cholangiogram;  Surgeon: Gauri Travis MD;  Location: [facility] MAIN OR;  Service: General    COLONOSCOPY  01/06/2015    Diverticulosis, one TA    COLONOSCOPY N/A 02/14/2019    tics, " NBIH, adenomatous polyp x 2    COLONOSCOPY N/A 11/05/2021    Procedure: COLONOSCOPY TO CECUM AND TERM. ILEUM WITH COLD POLYPECTOMIES;  Surgeon: Everton Abel MD;  Location:  [facility];  Service: Gastroenterology;  Laterality: N/A;  PRE OP - PERS H/O POLYPS  POST OP - COLON POLYPS,, DIVERTICULOSIS, HEMORRHOIDS    CORONARY ARTERY BYPASS GRAFT N/A 04/11/2017    Procedure: AR STERNOTOMY CORONARY ARTERY BYPASS GRAFT TIMES 3 USING LEFT INTERNAL MAMMARY ARTERY AND LEFT GREATER SAPHENOUS VEIN GRAFT PER ENDOSCOPIC VEIN HARVESTING AND PRP ;  Surgeon: Temo Cortez MD;  Location: [facility] MAIN OR;  Service:     ENDOSCOPY  01/06/2015    HH, Ervin's esophagus    ENDOSCOPY N/A 02/14/2019    Z line irregular, HH, Ervin's esophagus    ENDOSCOPY N/A 11/05/2021    Procedure: ESOPHAGOGASTRODUODENOSCOPY WITH BIOPSIES;  Surgeon: Everton Abel MD;  Location:  [facility];  Service: Gastroenterology;  Laterality: N/A;  PRE OP - PERS H/O ERVIN'S  POST OP - IRREG Z LINE    KNEE SURGERY Left     URETEROSCOPY LASER LITHOTRIPSY WITH STENT INSERTION Left 8/23/2022    Procedure: Cysto retrograde with left uretro stent placement;  Surgeon: Damien Oliveira MD;  Location:  [facility];  Service: Urology;  Laterality: Left;    VASECTOMY         SLP Recommendation and Plan  SLP Swallowing Diagnosis: mod-severe, oral dysphagia, pharyngeal dysphagia, other (see comments) (suspect esophageal dysphagia d/t CT results) (11/03/23 0900)  SLP Diet Recommendation: NPO, long term alternate methods of nutrition/hydration, other (see comments) (longer rehab period suspected) (11/03/23 0900)              Recommended Diagnostics: reassess via VFSS (MBS), other (see comments) (w/ esophageal scan) (11/03/23 0900)     Anticipated Discharge Disposition (SLP): anticipate therapy at next level of care (11/03/23 0900)  Rehab Potential/Prognosis, Swallowing: good, to achieve stated therapy goals (11/03/23 0900)  Therapy Frequency  "(Swallow): PRN (11/03/23 0900)  Predicted Duration Therapy Intervention (Days): until discharge (11/03/23 0900)  Oral Care Recommendations: Oral Care BID/PRN (11/03/23 0900)                                      Oral Care Recommendations: Oral Care BID/PRN (11/03/23 0900)    Plan of Care Reviewed With: patient  Outcome Evaluation: Note results of CT (2 hours post VFSS) showed barium remaining in the esophagus \"from just above the level of the veronica down into the stomach\" as well as \"small bright densities within the left lower lobe consolidation likely from aspiration of the barium\". Given these results suspect additional aspiration after the swallow for residue and/or possible backflow.      SWALLOW EVALUATION (last 72 hours)       SLP Adult Swallow Evaluation       Row Name 11/03/23 0900 11/01/23 1115                Rehab Evaluation    Document Type evaluation;other (see comments)  VFSS  -SA therapy note (daily note)  -SA       Subjective Information no complaints  -SA --       Patient Observations alert;cooperative  weak  -SA alert;cooperative  -SA       Patient Effort good  -SA good  -SA       Symptoms Noted During/After Treatment none  -SA none  -SA          General Information    Patient Profile Reviewed yes  -SA yes  -SA       Current Method of Nutrition NPO;nasogastric feedings  -SA NPO;nasogastric feedings  -SA       Plans/Goals Discussed with patient;agreed upon  -SA patient;agreed upon  -SA       Barriers to Rehab medically complex  -SA medically complex  -SA       Patient's Goals for Discharge patient did not state  -SA return to PO diet  -SA          Pain    Additional Documentation Pain Scale: Numbers Pre/Post-Treatment (Group)  -SA --          Pain Scale: Numbers Pre/Post-Treatment    Pretreatment Pain Rating 0/10 - no pain  -SA --       Posttreatment Pain Rating 0/10 - no pain  -SA --          Oral Motor Structure and Function    Dentition Assessment missing teeth  -SA --       Volitional Cough weak  " -SA --          Oral Musculature and Cranial Nerve Assessment    Oral Motor General Assessment generalized oral motor weakness  -SA --       Vocal Impairment, Detail. Cranial Nerve X (Vagus) vocal quality abnormality (see comments);impaired throat clear/cough (see comments)  -SA --          MBS/VFSS    Utensils Used spoon  -SA --       Consistencies Trialed pureed;nectar/syrup-thick liquids;honey-thick liquids  -SA --          MBS/VFSS Interpretation    Oral Prep Phase WFL  -SA --       Oral Transit Phase impaired  -SA --       Oral Residue WFL  -SA --          Oral Transit Phase    Impaired Oral Transit Phase piecemeal oral transit;premature spillage of liquids into pharynx  -SA --       Piecemeal Oral Transit honey-thick liquids  -SA --       Premature Spillage of Liquids into Pharynx nectar-thick liquids;pudding/puree  -SA --          Initiation of Pharyngeal Swallow    Pharyngeal Phase impaired pharyngeal phase of swallowing  -SA --       Penetration During the Swallow nectar-thick liquids;honey-thick liquids;pudding/puree  3/4 w/ puree; 3/7 w/ honey thick  -SA --       Aspiration During the Swallow nectar-thick liquids  -SA --       Response to Penetration No  -SA --       No spontaneous response to penetration and non-effective laryngeal clearance with cue (see comments)  -SA --       Response to Aspiration No  -SA --       No spontaneous response to aspiration and non-effective subglottic clearance with cue (see comments)  -SA --       Pharyngeal Residue valleculae;all consistencies tested;other (see comments)  mod w/ puree  -SA --       Response to Residue partial residue clearance;cleared residue with spontaneous subsequent swallow;cleared residue with cued swallow  -SA --       Attempted Compensatory Maneuvers bolus size;multiple swallows;effortful (hard swallow)  -SA --       Response to Attempted Compensatory Maneuvers did not prevent penetration;did not reduce residue  -SA --          Esophageal Phase  "   Esophageal Phase, Comment not assessed as part of this test; however, note results of CT (2 hours post VFSS) showed barium remaining in the esophagus \"from just above the level of the veronica down into the stomach\" as well as \"small bright densities within the left lower lobe consolidation likely from aspiration of the barium\".  -SA --          SLP Communication to Radiology    Summary Statement VFSS completed in conjunction with Dr Knox.  Pt demonstrated a moderate to severe oropharyngeal swallow.  Penetration and aspiration observed with nectar thick liquid by spoon.  Inconsistent penetration of puree and honey thick d/t fatigue with inability to clear penetration.  Multiple swallows assisted in reducing pharyngeal residue.  Premature spillage, inconsistent epiglottic deflection, tongue base residue, and hyoid excursion observed to impact swallow.  -SA --          SLP Evaluation Clinical Impression    SLP Swallowing Diagnosis mod-severe;oral dysphagia;pharyngeal dysphagia;other (see comments)  suspect esophageal dysphagia d/t CT results  -SA --       Functional Impact risk of aspiration/pneumonia  -SA --       Rehab Potential/Prognosis, Swallowing good, to achieve stated therapy goals  -SA --          Recommendations    Therapy Frequency (Swallow) PRN  -SA --       Predicted Duration Therapy Intervention (Days) until discharge  -SA --       SLP Diet Recommendation NPO;long term alternate methods of nutrition/hydration;other (see comments)  longer rehab period suspected  -SA --       Recommended Diagnostics reassess via VFSS ([facility]);other (see comments)  w/ esophageal scan  -SA --       Oral Care Recommendations Oral Care BID/PRN  -SA --       Anticipated Discharge Disposition (SLP) anticipate therapy at next level of care  -SA --          Swallow Goals (SLP)    Swallow STGs pharyngeal strengthening exercise goal selection (SLP)  -SA --       Pharyngeal Strengthening Exercise Goal Selection (SLP) pharyngeal " strengthening exercise, SLP goal 1  -SA --          (LTG) Patient will demonstrate functional swallow for    Diet Texture (Demonstrate functional swallow) -- soft to chew (chopped) textures  -SA       Liquid viscosity (Demonstrate functional swallow) -- thin liquids  -SA       [place] (Demonstrate functional swallow) -- independently (over 90% accuracy)  -SA       Time Frame (Demonstrate functional swallow) -- by discharge  -SA       Progress/Outcomes (Demonstrate functional swallow) -- progress slower than expected  -SA       Comment (Demonstrate functional swallow) -- Swallow re-evaulated bedside.  Pt continues to demonstrate weak voice with intermittent wet quality baseline.  Throat clear/cough weak with ability to clear only small amounts into mouth.  Presented with ice, thin, nectar, honey, puree by spoon.  Pt demonstrated cough following puree consistency and increased wetness of vocal quality following thin liquid.  No overt s/s aspiration with nectar, honey thick liquids.  Recommend instrumental eval (VFSS, FEES) to determine safety of po for diet or trials.Continue NPO w/ cortrak for nutrition and meds with ice chips w/ oral care until instrumental swallow performed.  -SA          (STG) Pharyngeal Strengthening Exercise Goal 1 (SLP)    Activity (Pharyngeal Strengthening Goal 1, SLP) increase timing;increase superior movement of the hyolaryngeal complex;increase anterior movement of the hyolaryngeal complex;increase epiglottic inversion and retroflexion;increase closure at entrance to airway/closure of airway at glottis;increase squeeze/positive pressure generation;increase tongue base retraction  -SA --                 User Key  (r) = Recorded By, (t) = Taken By, (c) = Cosigned By      Initials Name Effective Dates    Jodie Brown MS CCC-SLP 07/11/23 -                     EDUCATION  The patient has been educated in the following areas:   Home Exercise Program (HEP) Dysphagia (Swallowing  Impairment) Oral Care/Hydration NPO rationale.        SLP GOALS       Row Name 11/03/23 0900 11/01/23 1115          (LTG) Patient will demonstrate functional swallow for    Diet Texture (Demonstrate functional swallow) -- soft to chew (chopped) textures  -SA     Liquid viscosity (Demonstrate functional swallow) -- thin liquids  -SA     [place] (Demonstrate functional swallow) -- independently (over 90% accuracy)  -SA     Time Frame (Demonstrate functional swallow) -- by discharge  -SA     Progress/Outcomes (Demonstrate functional swallow) -- progress slower than expected  -SA     Comment (Demonstrate functional swallow) -- Swallow re-evaulated bedside.  Pt continues to demonstrate weak voice with intermittent wet quality baseline.  Throat clear/cough weak with ability to clear only small amounts into mouth.  Presented with ice, thin, nectar, honey, puree by spoon.  Pt demonstrated cough following puree consistency and increased wetness of vocal quality following thin liquid.  No overt s/s aspiration with nectar, honey thick liquids.  Recommend instrumental eval (VFSS, FEES) to determine safety of po for diet or trials.Continue NPO w/ cortrak for nutrition and meds with ice chips w/ oral care until instrumental swallow performed.  -SA        (STG) Pharyngeal Strengthening Exercise Goal 1 (SLP)    Activity (Pharyngeal Strengthening Goal 1, SLP) increase timing;increase superior movement of the hyolaryngeal complex;increase anterior movement of the hyolaryngeal complex;increase epiglottic inversion and retroflexion;increase closure at entrance to airway/closure of airway at glottis;increase squeeze/positive pressure generation;increase tongue base retraction  -SA --               User Key  (r) = Recorded By, (t) = Taken By, (c) = Cosigned By      Initials Name Provider Type     Jodie Peacock MS CCC-SLP Speech and Language Pathologist                       Time Calculation:    Time Calculation- SLP       Row  Name 11/03/23 1332             Time Calculation- SLP    SLP Start Time 0915  -      SLP Received On 11/03/23  -                User Key  (r) = Recorded By, (t) = Taken By, (c) = Cosigned By      Initials Name Provider Type    Jodie Brown MS CCC-SLP Speech and Language Pathologist                    Therapy Charges for Today       Code Description Service Date Service Provider Modifiers Qty    [id number] HC ST EVAL ORAL PHARYNG SWALLOW 6 11/3/2023 Jodie Peacock MS CCC-GONZÁLEZ GN 1                 Jodie Peacock MS CCC-GONZÁLEZ  11/3/2023

## 2023-11-03 NOTE — PLAN OF CARE
Goal Outcome Evaluation:  Plan of Care Reviewed With: patient        Progress: improving  Outcome Evaluation: Pt tolerated treatment fair this date. Required slightly less assist throughout session, limited overall d/t weakness and fatigue. Required mod A x1 for supine to sit, then maintained sitting balance w/ mostly SBA. Pt completed a few exercises while sitting EOB, then stood twice, requiring mod-max A x2. Unable to take side steps towards HOB today. Pt returned to supine w/ max A x2. Printed HEp given to pt w/ family present. Encouraged pt to attempt supine exercises during the day.      Anticipated Discharge Disposition (PT): skilled nursing facility

## 2023-11-03 NOTE — PLAN OF CARE
"Goal Outcome Evaluation:            Pt A&Ox4. At beginning of shift patient very unhopeful. Pt expressed feelings of wanting to give up and \"die\". Patient expressed these feelings to his family. After speaking with family at bedside, patient in better spirits and says that he does not want to give up and he wants to fight. Patient does have a palliative consult. Turned q2. Incontinence care provided. Tube feeds continued. PRN pain medication given q8. VSS.             "

## 2023-11-03 NOTE — PLAN OF CARE
Goal Outcome Evaluation:  Plan of Care Reviewed With: patient           Outcome Evaluation: VFSS completed.  Pt demonstrated premature spillage of nectar to valleculae and pyriforms with penetration and aspiration during the swallow.  Delayed reaction with inability to clear aspiration.  Initial 3 tsp presentations of honey thick demonstrated spontaneous double swallow with no penetration or aspiration.  Later presentation of honey by 1/2-1 tsp demonstrated penetration with inconsistent ability to clear.  Safety of honey thick greatly affected by fatigue with no penetration noted with initial presentations or with cued hard effortful swallow.  1/2 tsp presentations of puree demonstrated penetration 3/4 trials with inability to clear penetrated material.  Inconsistent epiglottic deflection, tongue base retraction, and laryngeal excursion noted to adversely impact safety of swallow.  At this time pt is not safe for po diet or significant po trials.  REC continue alternative feeding with consideration of longer term means for nutrition and meds during pt's rehab.  Also recommend continued dysphagia therapy to include pharyngeal movement and strengthening (exercises provided).  SLP will provied small, controlled trials of honey thick with hard effortful/double swallow as appropriate during therapy.

## 2023-11-04 LAB
ALBUMIN SERPL-MCNC: 2.4 G/DL (ref 3.5–5.2)
ALBUMIN/GLOB SERPL: 0.9 G/DL
ALP SERPL-CCNC: 124 U/L (ref 39–117)
ALT SERPL W P-5'-P-CCNC: 55 U/L (ref 1–41)
ANION GAP SERPL CALCULATED.3IONS-SCNC: 8 MMOL/L (ref 5–15)
AST SERPL-CCNC: 37 U/L (ref 1–40)
BILIRUB SERPL-MCNC: 0.6 MG/DL (ref 0–1.2)
BUN SERPL-MCNC: 39 MG/DL (ref 8–23)
BUN/CREAT SERPL: 57.4 (ref 7–25)
CALCIUM SPEC-SCNC: 8.1 MG/DL (ref 8.6–10.5)
CHLORIDE SERPL-SCNC: 103 MMOL/L (ref 98–107)
CO2 SERPL-SCNC: 25 MMOL/L (ref 22–29)
CREAT SERPL-MCNC: 0.68 MG/DL (ref 0.76–1.27)
DEPRECATED RDW RBC AUTO: 51.7 FL (ref 37–54)
EGFRCR SERPLBLD CKD-EPI 2021: 95.1 ML/MIN/1.73
ERYTHROCYTE [DISTWIDTH] IN BLOOD BY AUTOMATED COUNT: 12.8 % (ref 12.3–15.4)
GLOBULIN UR ELPH-MCNC: 2.7 GM/DL
GLUCOSE BLDC GLUCOMTR-MCNC: 143 MG/DL (ref 70–130)
GLUCOSE BLDC GLUCOMTR-MCNC: 148 MG/DL (ref 70–130)
GLUCOSE BLDC GLUCOMTR-MCNC: 150 MG/DL (ref 70–130)
GLUCOSE BLDC GLUCOMTR-MCNC: 179 MG/DL (ref 70–130)
GLUCOSE SERPL-MCNC: 160 MG/DL (ref 65–99)
HBA1C MFR BLD: 5.5 % (ref 4.8–5.6)
HCT VFR BLD AUTO: 31.7 % (ref 37.5–51)
HGB BLD-MCNC: 10.8 G/DL (ref 13–17.7)
MAGNESIUM SERPL-MCNC: 2.1 MG/DL (ref 1.6–2.4)
MCH RBC QN AUTO: 37.9 PG (ref 26.6–33)
MCHC RBC AUTO-ENTMCNC: 34.1 G/DL (ref 31.5–35.7)
MCV RBC AUTO: 111.2 FL (ref 79–97)
PHOSPHATE SERPL-MCNC: 2.2 MG/DL (ref 2.5–4.5)
PHOSPHATE SERPL-MCNC: 2.8 MG/DL (ref 2.5–4.5)
PLATELET # BLD AUTO: 197 10*3/MM3 (ref 140–450)
PMV BLD AUTO: 12.2 FL (ref 6–12)
POTASSIUM SERPL-SCNC: 4.3 MMOL/L (ref 3.5–5.2)
PROT SERPL-MCNC: 5.1 G/DL (ref 6–8.5)
RBC # BLD AUTO: 2.85 10*6/MM3 (ref 4.14–5.8)
SODIUM SERPL-SCNC: 136 MMOL/L (ref 136–145)
WBC NRBC COR # BLD: 15.04 10*3/MM3 (ref 3.4–10.8)

## 2023-11-04 PROCEDURE — 82948 REAGENT STRIP/BLOOD GLUCOSE: CPT

## 2023-11-04 PROCEDURE — 83036 HEMOGLOBIN GLYCOSYLATED A1C: CPT | Performed by: INTERNAL MEDICINE

## 2023-11-04 PROCEDURE — 25010000002 CEFEPIME PER 500 MG: Performed by: INTERNAL MEDICINE

## 2023-11-04 PROCEDURE — 84100 ASSAY OF PHOSPHORUS: CPT | Performed by: INTERNAL MEDICINE

## 2023-11-04 PROCEDURE — 25810000003 SODIUM CHLORIDE 0.9 % SOLUTION: Performed by: INTERNAL MEDICINE

## 2023-11-04 PROCEDURE — 80053 COMPREHEN METABOLIC PANEL: CPT | Performed by: HOSPITALIST

## 2023-11-04 PROCEDURE — 83735 ASSAY OF MAGNESIUM: CPT | Performed by: INTERNAL MEDICINE

## 2023-11-04 PROCEDURE — 85027 COMPLETE CBC AUTOMATED: CPT | Performed by: HOSPITALIST

## 2023-11-04 PROCEDURE — 25010000002 METRONIDAZOLE 500 MG/100ML SOLUTION: Performed by: INTERNAL MEDICINE

## 2023-11-04 PROCEDURE — 63710000001 INSULIN REGULAR HUMAN PER 5 UNITS: Performed by: HOSPITALIST

## 2023-11-04 RX ORDER — DEXAMETHASONE 2 MG/1
1 TABLET ORAL
Status: DISCONTINUED | OUTPATIENT
Start: 2023-11-05 | End: 2023-11-05

## 2023-11-04 RX ORDER — SODIUM PHOSPHATE IN 0.9 % NACL 15MMOL/100
15 PLASTIC BAG, INJECTION (ML) INTRAVENOUS ONCE
Status: COMPLETED | OUTPATIENT
Start: 2023-11-04 | End: 2023-11-04

## 2023-11-04 RX ADMIN — Medication 1 APPLICATION: at 17:27

## 2023-11-04 RX ADMIN — METRONIDAZOLE 500 MG: 500 INJECTION, SOLUTION INTRAVENOUS at 11:43

## 2023-11-04 RX ADMIN — SODIUM PHOSPHATE, MONOBASIC, MONOHYDRATE AND SODIUM PHOSPHATE, DIBASIC, ANHYDROUS 15 MMOL: 276; 142 INJECTION, SOLUTION INTRAVENOUS at 14:25

## 2023-11-04 RX ADMIN — CEFEPIME 2000 MG: 2 INJECTION, POWDER, FOR SOLUTION INTRAVENOUS at 11:43

## 2023-11-04 RX ADMIN — Medication 1 APPLICATION: at 19:40

## 2023-11-04 RX ADMIN — INSULIN HUMAN 2 UNITS: 100 INJECTION, SOLUTION PARENTERAL at 17:25

## 2023-11-04 RX ADMIN — SUCRALFATE 1 G: 1 TABLET ORAL at 17:25

## 2023-11-04 RX ADMIN — OXYBUTYNIN CHLORIDE 5 MG: 5 TABLET, EXTENDED RELEASE ORAL at 10:05

## 2023-11-04 RX ADMIN — Medication 1 APPLICATION: at 10:05

## 2023-11-04 RX ADMIN — AMLODIPINE BESYLATE 5 MG: 5 TABLET ORAL at 10:05

## 2023-11-04 RX ADMIN — FUROSEMIDE 20 MG: 20 TABLET ORAL at 10:05

## 2023-11-04 RX ADMIN — DEXAMETHASONE 3 MG: 2 TABLET ORAL at 10:05

## 2023-11-04 RX ADMIN — METRONIDAZOLE 500 MG: 500 INJECTION, SOLUTION INTRAVENOUS at 19:40

## 2023-11-04 RX ADMIN — CEFEPIME 2000 MG: 2 INJECTION, POWDER, FOR SOLUTION INTRAVENOUS at 19:40

## 2023-11-04 RX ADMIN — AMITRIPTYLINE HYDROCHLORIDE 50 MG: 50 TABLET, FILM COATED ORAL at 19:40

## 2023-11-04 RX ADMIN — Medication 1 APPLICATION: at 11:48

## 2023-11-04 RX ADMIN — LANSOPRAZOLE 30 MG: 15 TABLET, ORALLY DISINTEGRATING ORAL at 06:43

## 2023-11-04 RX ADMIN — CEFEPIME 2000 MG: 2 INJECTION, POWDER, FOR SOLUTION INTRAVENOUS at 02:56

## 2023-11-04 RX ADMIN — SUCRALFATE 1 G: 1 TABLET ORAL at 06:43

## 2023-11-04 RX ADMIN — METRONIDAZOLE 500 MG: 500 INJECTION, SOLUTION INTRAVENOUS at 02:56

## 2023-11-04 NOTE — PROGRESS NOTES
"  Daily Progress Note.   [address]  11/4/2023    Patient:  Name:  Madhu Santoro  MRN:  [id number]  1945  77 y.o.  male         CC: covid [facility]    Interval History:  Having more difficulty with reflux.  Mood seems little more depressed today.  Family at bedside.  No worsening shortness of breath and patient remains on room air.      Physical Exam:  /100 (BP Location: Left arm, Patient Position: Lying)   Pulse 92   Temp 97.9 °F (36.6 °C) (Oral)   Resp 20   Ht 172.7 cm (68\")   Wt 77.4 kg (170 lb 10.2 oz)   SpO2 98%   BMI 25.95 kg/m²   Body mass index is 25.95 kg/m².    Intake/Output Summary (Last 24 hours) at 11/4/2023 1302  Last data filed at 11/3/2023 2000  Gross per 24 hour   Intake 1280 ml   Output --   Net 1280 ml     General appearance: ill weak appearing, conversant   Eyes: anicteric sclerae, moist conjunctivae; no lidlag;    HENT: Atraumatic; oropharynx +cortrak  Lungs: No significant rhonchi no wheeze, with normal respiratory effort and no intercostal retractions  CV: RRR, no rub   Abdomen: Soft, non rigid; BS+  Skin: WWP no diffuse visible rash  Psych: calm affect, alert   Neuro: Moves all ext. Grossly CN II-XII. Speech soft however it is understandable    Data Review:  Notable Labs:  Results from last 7 days   Lab Units 11/04/23  0743 11/03/23  0738 11/02/23  0854 11/01/23  1211 10/31/23  0541   WBC 10*3/mm3 15.04* 16.63* 17.16* 17.36* 19.35*   HEMOGLOBIN g/dL 10.8* 11.1* 11.9* 11.9* 13.6   PLATELETS 10*3/mm3 197 187 159 143 182     Results from last 7 days   Lab Units 11/04/23  0743 11/03/23  1336 11/02/23  0854 11/01/23  1211 10/31/23  0541 10/30/23  0648   SODIUM mmol/L 136 134* 134* 137 132*  --    POTASSIUM mmol/L 4.3 4.6 4.3 4.1 4.6 4.1   CHLORIDE mmol/L 103 100 100 102 101  --    CO2 mmol/L 25.0 23.8 24.0 26.9 21.0*  --    BUN mg/dL 39* 42* 31* 31* 29*  --    CREATININE mg/dL 0.68* 0.83 0.73* 0.83 0.84  --    GLUCOSE mg/dL 160* 250* 263* 183* 184*  --  "   CALCIUM mg/dL 8.1* 8.1* 8.5* 8.6 8.6  --    MAGNESIUM mg/dL 2.1  --  2.1  --   --  2.2   PHOSPHORUS mg/dL 2.2*  --   --   --   --  2.3*   Estimated Creatinine Clearance: 98 mL/min (A) (by C-G formula based on SCr of 0.68 mg/dL (L)).    Results from last 7 days   Lab Units 11/04/23  0743 11/03/23  1336 11/03/23  0738 11/02/23  0854   AST (SGOT) U/L 37 56*  --  38   ALT (SGPT) U/L 55* 67*  --  50*   PROCALCITONIN ng/mL  --   --   --  0.76*   PLATELETS 10*3/mm3 197  --  187 159               Imaging:  Reviewed chest images personally from past 3 days    ASSESSMENT  /  PLAN:  COVID 19 pneumonia  Bacterial Pneumonia  Dysphagia  AFib    CAD s/p CABG  HTN  Chronic Pain       Remdesivir limited by transaminits - improving  Decadron wean noted agree    Dysphagia plans note potential peg   Airway clr challenging with pt weakness overall however seems to be improving    Lansoprazole.  So Carafate  Unclear if Maalox will help if core track is small bowel defer to primary service    CT scan showing no concern for empyema pleural effusion parapneumonic effusion.  Dense consolidations consistent with bacterial pneumonia that we have been treating.  Strong suspect that given his dysphagia this is from recurrent aspiration.  Given his overall weakness this is very concerning.  Cefepime/flagyl course to complete  Monitor for any recurrent fever  Will need otupt repeat ct chest to ensure resolution as outpatient.    Discussed with the patient and his daughter at bedside all questions answered.      Electronically signed by Albert Daly MD, 11/04/23, 3:44 PM EDT.

## 2023-11-04 NOTE — PROGRESS NOTES
Name: Madhu Santoro ADMIT: 10/23/2023   : 1945  PCP: Damian Sanchez MD    MRN: [id number] LOS: 10 days   AGE/SEX: 77 y.o. male  ROOM: [facility]     Subjective   Subjective   Chief Complaint   Patient presents with    Fall     Cough intermittently.  Not reporting any nausea or abdominal pain.  Discussed with family at bedside.  On room air.     Objective   Objective   Vital Signs  Temp:  [97.3 °F (36.3 °C)-98 °F (36.7 °C)] 97.9 °F (36.6 °C)  Heart Rate:  [88-92] 92  Resp:  [18-20] 20  BP: (113-147)/() 113/100  SpO2:  [98 %] 98 %  on   ;   Device (Oxygen Therapy): room air  Body mass index is 25.95 kg/m².    Physical Exam  Vitals and nursing note reviewed.   Constitutional:       General: He is not in acute distress.     Appearance: He is ill-appearing. He is not diaphoretic.   HENT:      Head: Atraumatic.      Nose:      Comments: NG  Cardiovascular:      Rate and Rhythm: Normal rate and regular rhythm.      Pulses: Normal pulses.   Pulmonary:      Effort: Pulmonary effort is normal.      Breath sounds: Rhonchi present. No wheezing.   Abdominal:      General: There is no distension.      Palpations: Abdomen is soft.      Tenderness: There is no abdominal tenderness. There is no guarding or rebound.   Musculoskeletal:         General: No tenderness.      Right lower leg: No edema.      Left lower leg: No edema.   Skin:     General: Skin is warm and dry.   Neurological:      Mental Status: He is alert.      Cranial Nerves: No cranial nerve deficit.      Motor: Weakness present.   Psychiatric:         Mood and Affect: Mood normal.       Results Review  I reviewed the patient's new clinical results.    Results from last 7 days   Lab Units 23  0743 23  0738 23  0854 23  1211   WBC 10*3/mm3 15.04* 16.63* 17.16* 17.36*   HEMOGLOBIN g/dL 10.8* 11.1* 11.9* 11.9*   PLATELETS 10*3/mm3 197 187 159 143     Results from last 7 days   Lab Units 23  0743 23  1336 23  0854  11/01/23  1211   SODIUM mmol/L 136 134* 134* 137   POTASSIUM mmol/L 4.3 4.6 4.3 4.1   CHLORIDE mmol/L 103 100 100 102   CO2 mmol/L 25.0 23.8 24.0 26.9   BUN mg/dL 39* 42* 31* 31*   CREATININE mg/dL 0.68* 0.83 0.73* 0.83   GLUCOSE mg/dL 160* 250* 263* 183*   EGFR mL/min/1.73 95.1 89.6 93.1 89.6     Results from last 7 days   Lab Units 11/04/23  0743 11/03/23  1336 11/02/23  0854 11/01/23  1211   ALBUMIN g/dL 2.4* 2.5* 2.5* 2.8*   BILIRUBIN mg/dL 0.6 0.6 0.5 0.6   ALK PHOS U/L 124* 145* 112 123*   AST (SGOT) U/L 37 56* 38 36   ALT (SGPT) U/L 55* 67* 50* 46*     Results from last 7 days   Lab Units 11/04/23  0743 11/03/23  1336 11/02/23  0854 11/01/23  1211 10/31/23  0541 10/30/23  0648   CALCIUM mg/dL 8.1* 8.1* 8.5* 8.6   < >  --    ALBUMIN g/dL 2.4* 2.5* 2.5* 2.8*   < >  --    MAGNESIUM mg/dL 2.1  --  2.1  --   --  2.2   PHOSPHORUS mg/dL 2.2*  --   --   --   --  2.3*    < > = values in this interval not displayed.     Results from last 7 days   Lab Units 11/02/23  0854   PROCALCITONIN ng/mL 0.76*     Glucose   Date/Time Value Ref Range Status   11/04/2023 0630 143 (H) 70 - 130 mg/dL Final   11/04/2023 0032 148 (H) 70 - 130 mg/dL Final   11/03/2023 1707 235 (H) 70 - 130 mg/dL Final   11/03/2023 1148 219 (H) 70 - 130 mg/dL Final   11/03/2023 0555 120 70 - 130 mg/dL Final   11/03/2023 0029 132 (H) 70 - 130 mg/dL Final   11/02/2023 1741 318 (H) 70 - 130 mg/dL Final       FL Video Swallow Single Contrast    Result Date: 11/3/2023  Fluoroscopy was provided for the speech pathologist during a video swallow study. For full details please see the speech pathology report.  This report was finalized on 11/3/2023 5:20 PM by Dr. Avel Knox M.D on Workstation: [id number]      XR Chest 1 View    Result Date: 11/2/2023  No significant change.  This report was finalized on 11/2/2023 12:37 PM by Dr. Mukesh Bruce M.D on Workstation: [id number]       I have personally reviewed all medications:  Scheduled Medications  amitriptyline,  50 mg, Oral, Nightly  amLODIPine, 5 mg, Oral, Daily  [Held by provider] apixaban, 5 mg, Oral, BID  cefepime, 2,000 mg, Intravenous, Q8H  dexAMETHasone, 3 mg, Oral, Daily With Breakfast  furosemide, 20 mg, Oral, Daily  HYDROcodone-acetaminophen, 1 tablet, Oral, Nightly  insulin regular, 2-9 Units, Subcutaneous, Q6H  lansoprazole, 30 mg, Nasogastric, QAM AC  Menthol-Zinc Oxide, 1 application , Topical, 4x Daily  metroNIDAZOLE, 500 mg, Intravenous, Q8H  oxybutynin XL, 5 mg, Oral, Daily  sucralfate, 1 g, Oral, BID AC      Infusions     Diet  NPO Diet NPO Type: Strict NPO    I have personally reviewed:  [x]  Laboratory   []  Microbiology   []  Radiology   []  EKG/Telemetry  []  Cardiology/Vascular   []  Pathology    []  Records       Assessment/Plan     Active Hospital Problems    Diagnosis  POA    **COVID-19 [U07.1]  Yes    Polycythemia [D75.1]  Yes    Chronic anticoagulation [Z79.01]  Not Applicable    PAD (peripheral artery disease) [I73.9]  Yes    Stenosis of carotid artery [I65.29]  Yes    S/P ablation of atrial flutter [Z98.890, Z86.79]  Not Applicable    S/P CABG (coronary artery bypass graft) [Z95.1]  Not Applicable    Coronary artery disease due to lipid rich plaque [I25.10, I25.83]  Yes    HLD (hyperlipidemia) [E78.5]  Yes    Benign essential hypertension [I10]  Yes    Cerebrovascular accident [I63.9]  Yes      Resolved Hospital Problems   No resolved problems to display.       77 y.o. male admitted with COVID-19.    COVID-19 and aspiration pneumonia: He completed initial antibiotic course and this has been resumed with cefepime and Flagyl.  He does have bilateral pneumonia.   He received 3 doses of remdesivir this admission.  It was stopped due to elevating LFTs.  Completed acute dexamethasone course.  No bronchospasm today.  On room air.  Will taper . pulmonology following.  Acute hypoxic respiratory failure resolved  Dysphagia: Had aspiration during the VFSS.  Continue tube feeds.  Surgery following.   Possible PEG on Monday.  Atrial fibrillation/history of ablation/CAD: Cardiology evaluated . continuing medical management including Eliquis.  Regular rhythm with PVC on telemetry.  Hypertension: Acceptable acutely.  Continue current regimen  History of stroke: Eliquis on hold for procedure..  Statin held due to elevated LFT.  LFTs are improving.  HLD: See above  Chronic pain: Continue pain control.  Polycythemia: Hydrea was stopped.  Patient does not have elevated hemoglobin currently.  Hyperglycemia on steroid: Checking A1c.  Continue regular insulin with tube feeds.  Weaning steroid as above.  Deconditioning: Continue PT .  PPX: AC as above  Disposition: SNF/TBD    Expected Discharge Date: 11/8/2023; Expected Discharge Time:      Michael Griffin MD  [facility]  11/04/23  10:14 EDT    Dictated portions of note using Dragon dictation software.  Copied text in this note has been reviewed by me and remains accurate as of 11/04/23

## 2023-11-04 NOTE — PLAN OF CARE
Goal Outcome Evaluation:  Plan of Care Reviewed With: patient        Progress: no change  Outcome Evaluation: Currently on room air,suction at bedside for secretions. Tube feeds continue at goal rate with flushes. Incontinence care provided. Pt turned as tolerated. Calmoseptine to buttocks reddened areas. VSS. Soft spoken and difficult to hear at times. IV antibiotics continue. Possible PEG on Monday. Eliquis on hold. CORNELIA.

## 2023-11-05 LAB
ALBUMIN SERPL-MCNC: 2.6 G/DL (ref 3.5–5.2)
ALBUMIN/GLOB SERPL: 1 G/DL
ALP SERPL-CCNC: 123 U/L (ref 39–117)
ALT SERPL W P-5'-P-CCNC: 53 U/L (ref 1–41)
ANION GAP SERPL CALCULATED.3IONS-SCNC: 6 MMOL/L (ref 5–15)
AST SERPL-CCNC: 39 U/L (ref 1–40)
BILIRUB SERPL-MCNC: 0.9 MG/DL (ref 0–1.2)
BUN SERPL-MCNC: 35 MG/DL (ref 8–23)
BUN/CREAT SERPL: 50 (ref 7–25)
CALCIUM SPEC-SCNC: 8.3 MG/DL (ref 8.6–10.5)
CHLORIDE SERPL-SCNC: 103 MMOL/L (ref 98–107)
CO2 SERPL-SCNC: 27 MMOL/L (ref 22–29)
CREAT SERPL-MCNC: 0.7 MG/DL (ref 0.76–1.27)
DEPRECATED RDW RBC AUTO: 53.4 FL (ref 37–54)
EGFRCR SERPLBLD CKD-EPI 2021: 94.3 ML/MIN/1.73
ERYTHROCYTE [DISTWIDTH] IN BLOOD BY AUTOMATED COUNT: 13.1 % (ref 12.3–15.4)
GLOBULIN UR ELPH-MCNC: 2.7 GM/DL
GLUCOSE BLDC GLUCOMTR-MCNC: 100 MG/DL (ref 70–130)
GLUCOSE BLDC GLUCOMTR-MCNC: 148 MG/DL (ref 70–130)
GLUCOSE BLDC GLUCOMTR-MCNC: 165 MG/DL (ref 70–130)
GLUCOSE BLDC GLUCOMTR-MCNC: 167 MG/DL (ref 70–130)
GLUCOSE SERPL-MCNC: 156 MG/DL (ref 65–99)
HCT VFR BLD AUTO: 32.1 % (ref 37.5–51)
HGB BLD-MCNC: 10.8 G/DL (ref 13–17.7)
MAGNESIUM SERPL-MCNC: 1.9 MG/DL (ref 1.6–2.4)
MCH RBC QN AUTO: 37.5 PG (ref 26.6–33)
MCHC RBC AUTO-ENTMCNC: 33.6 G/DL (ref 31.5–35.7)
MCV RBC AUTO: 111.5 FL (ref 79–97)
PLATELET # BLD AUTO: 265 10*3/MM3 (ref 140–450)
PMV BLD AUTO: 12 FL (ref 6–12)
POTASSIUM SERPL-SCNC: 4 MMOL/L (ref 3.5–5.2)
PROT SERPL-MCNC: 5.3 G/DL (ref 6–8.5)
RBC # BLD AUTO: 2.88 10*6/MM3 (ref 4.14–5.8)
SODIUM SERPL-SCNC: 136 MMOL/L (ref 136–145)
WBC NRBC COR # BLD: 17.97 10*3/MM3 (ref 3.4–10.8)

## 2023-11-05 PROCEDURE — 94761 N-INVAS EAR/PLS OXIMETRY MLT: CPT

## 2023-11-05 PROCEDURE — 25010000002 METRONIDAZOLE 500 MG/100ML SOLUTION: Performed by: INTERNAL MEDICINE

## 2023-11-05 PROCEDURE — 82948 REAGENT STRIP/BLOOD GLUCOSE: CPT

## 2023-11-05 PROCEDURE — 99231 SBSQ HOSP IP/OBS SF/LOW 25: CPT | Performed by: SURGERY

## 2023-11-05 PROCEDURE — 83735 ASSAY OF MAGNESIUM: CPT | Performed by: INTERNAL MEDICINE

## 2023-11-05 PROCEDURE — 94799 UNLISTED PULMONARY SVC/PX: CPT

## 2023-11-05 PROCEDURE — 63710000001 INSULIN REGULAR HUMAN PER 5 UNITS: Performed by: HOSPITALIST

## 2023-11-05 PROCEDURE — 25010000002 CEFEPIME PER 500 MG: Performed by: INTERNAL MEDICINE

## 2023-11-05 PROCEDURE — 80053 COMPREHEN METABOLIC PANEL: CPT | Performed by: HOSPITALIST

## 2023-11-05 PROCEDURE — 85027 COMPLETE CBC AUTOMATED: CPT | Performed by: HOSPITALIST

## 2023-11-05 RX ORDER — DEXAMETHASONE 2 MG/1
1 TABLET ORAL
Status: COMPLETED | OUTPATIENT
Start: 2023-11-06 | End: 2023-11-07

## 2023-11-05 RX ADMIN — INSULIN HUMAN 2 UNITS: 100 INJECTION, SOLUTION PARENTERAL at 17:45

## 2023-11-05 RX ADMIN — HYDROCODONE BITARTRATE AND ACETAMINOPHEN 1 TABLET: 5; 325 TABLET ORAL at 22:21

## 2023-11-05 RX ADMIN — Medication 1 APPLICATION: at 11:18

## 2023-11-05 RX ADMIN — FUROSEMIDE 20 MG: 20 TABLET ORAL at 09:11

## 2023-11-05 RX ADMIN — SUCRALFATE 1 G: 1 TABLET ORAL at 06:19

## 2023-11-05 RX ADMIN — Medication 1 APPLICATION: at 17:46

## 2023-11-05 RX ADMIN — OXYBUTYNIN CHLORIDE 5 MG: 5 TABLET, EXTENDED RELEASE ORAL at 09:11

## 2023-11-05 RX ADMIN — CEFEPIME 2000 MG: 2 INJECTION, POWDER, FOR SOLUTION INTRAVENOUS at 03:28

## 2023-11-05 RX ADMIN — Medication 1 APPLICATION: at 09:11

## 2023-11-05 RX ADMIN — AMLODIPINE BESYLATE 5 MG: 5 TABLET ORAL at 09:11

## 2023-11-05 RX ADMIN — AMITRIPTYLINE HYDROCHLORIDE 50 MG: 50 TABLET, FILM COATED ORAL at 22:21

## 2023-11-05 RX ADMIN — DEXAMETHASONE 1 MG: 2 TABLET ORAL at 09:11

## 2023-11-05 RX ADMIN — CEFEPIME 2000 MG: 2 INJECTION, POWDER, FOR SOLUTION INTRAVENOUS at 22:00

## 2023-11-05 RX ADMIN — CEFEPIME 2000 MG: 2 INJECTION, POWDER, FOR SOLUTION INTRAVENOUS at 11:17

## 2023-11-05 RX ADMIN — Medication 1 APPLICATION: at 22:00

## 2023-11-05 RX ADMIN — LANSOPRAZOLE 30 MG: 15 TABLET, ORALLY DISINTEGRATING ORAL at 06:19

## 2023-11-05 RX ADMIN — METRONIDAZOLE 500 MG: 500 INJECTION, SOLUTION INTRAVENOUS at 11:17

## 2023-11-05 RX ADMIN — METRONIDAZOLE 500 MG: 500 INJECTION, SOLUTION INTRAVENOUS at 03:28

## 2023-11-05 RX ADMIN — METRONIDAZOLE 500 MG: 500 INJECTION, SOLUTION INTRAVENOUS at 22:20

## 2023-11-05 RX ADMIN — INSULIN HUMAN 2 UNITS: 100 INJECTION, SOLUTION PARENTERAL at 11:18

## 2023-11-05 RX ADMIN — SUCRALFATE 1 G: 1 TABLET ORAL at 17:45

## 2023-11-05 NOTE — PROGRESS NOTES
"  Daily Progress Note.   [address]  11/5/2023    Patient:  Name:  Madhu Santoro  MRN:  [id number]  1945  77 y.o.  male         CC: covid [facility]    Interval History:  Afebrile no respiratory distress room air blood pressure stable  Having some cough some sputum production.  No hemoptysis.  No chest pain.  Says his reflux is better today.  Sat in chair for 2 hours yesterday      Physical Exam:  /72 (BP Location: Left arm, Patient Position: Lying)   Pulse 87   Temp 98.4 °F (36.9 °C) (Oral)   Resp 18   Ht 172.7 cm (68\")   Wt 73.7 kg (162 lb 7.7 oz)   SpO2 98%   BMI 24.70 kg/m²   Body mass index is 24.7 kg/m².    Intake/Output Summary (Last 24 hours) at 11/5/2023 1128  Last data filed at 11/5/2023 0045  Gross per 24 hour   Intake 876 ml   Output --   Net 876 ml     General appearance: ill weak appearing, conversant   Eyes: anicteric sclerae, moist conjunctivae; no lidlag;    HENT: Atraumatic; oropharynx +cortrak  Lungs: No significant rhonchi no wheeze, with normal respiratory effort and no intercostal retractions  CV: RRR, no rub   Abdomen: Soft, non rigid; BS+  Skin: WWP no diffuse visible rash  Psych: calm affect, alert   Neuro: Moves all ext. Grossly CN II-XII. Speech soft however understandable    Data Review:  Notable Labs:  Results from last 7 days   Lab Units 11/05/23  0734 11/04/23  0743 11/03/23  0738 11/02/23  0854 11/01/23  1211 10/31/23  0541   WBC 10*3/mm3 17.97* 15.04* 16.63* 17.16* 17.36* 19.35*   HEMOGLOBIN g/dL 10.8* 10.8* 11.1* 11.9* 11.9* 13.6   PLATELETS 10*3/mm3 265 197 187 159 143 182     Results from last 7 days   Lab Units 11/05/23  0734 11/04/23  2231 11/04/23  0743 11/03/23  1336 11/02/23  0854 11/01/23  1211 10/31/23  0541 10/30/23  0648   SODIUM mmol/L 136  --  136 134* 134* 137 132*  --    POTASSIUM mmol/L 4.0  --  4.3 4.6 4.3 4.1 4.6 4.1   CHLORIDE mmol/L 103  --  103 100 100 102 101  --    CO2 mmol/L 27.0  --  25.0 23.8 24.0 26.9 21.0*  --    BUN " mg/dL 35*  --  39* 42* 31* 31* 29*  --    CREATININE mg/dL 0.70*  --  0.68* 0.83 0.73* 0.83 0.84  --    GLUCOSE mg/dL 156*  --  160* 250* 263* 183* 184*  --    CALCIUM mg/dL 8.3*  --  8.1* 8.1* 8.5* 8.6 8.6  --    MAGNESIUM mg/dL 1.9  --  2.1  --  2.1  --   --  2.2   PHOSPHORUS mg/dL  --  2.8 2.2*  --   --   --   --  2.3*   Estimated Creatinine Clearance: 90.7 mL/min (A) (by C-G formula based on SCr of 0.7 mg/dL (L)).    Results from last 7 days   Lab Units 11/05/23  0734 11/04/23  0743 11/03/23  1336 11/03/23  0738 11/02/23  0854   AST (SGOT) U/L 39 37 56*  --  38   ALT (SGPT) U/L 53* 55* 67*  --  50*   PROCALCITONIN ng/mL  --   --   --   --  0.76*   PLATELETS 10*3/mm3 265 197  --  187 159               Imaging:  Reviewed chest images personally from past 3 days    ASSESSMENT  /  PLAN:  COVID 19 pneumonia  Bacterial Pneumonia  Dysphagia  AFib    CAD s/p CABG  HTN  Chronic Pain       Remdesivir limited by transaminits - improving  Decadron wean noted agree    Dysphagia plans note potential peg   Airway clr challenging with pt weakness overall however seems to be improving    Lansoprazole.  Carafate  Unclear if Maalox will help if core track is small bowel defer to primary service    CT scan showing no concern for empyema pleural effusion parapneumonic effusion.  Dense consolidations consistent with bacterial pneumonia that we have been treating.  Strong suspect that given his dysphagia this is from recurrent aspiration.  Given his overall weakness this is concerning.  Cefepime/flagyl course to complete  Monitor for any recurrent fever  Will need otupt repeat ct chest to ensure resolution as outpatient.    Repeat chest x-ray in the morning  Repeat procalcitonin on morning labs  Add OPEP  May need albuterol and hypertonic saline if has significant rhonchi at present time he has none.    Explained pulmonary plan of care with the patient's daughter at bedside    Electronically signed by Albert Daly MD,  11/05/23, 12:42 PM EST.

## 2023-11-05 NOTE — PROGRESS NOTES
Name: Madhu Santoro ADMIT: 10/23/2023   : 1945  PCP: Damian Sanchez MD    MRN: [id number] LOS: 11 days   AGE/SEX: 77 y.o. male  ROOM: [facility]     Subjective   Subjective   Chief Complaint   Patient presents with    Fall     Not reporting any nausea or abdominal pain. No SOA reported. He is a bit more withdrawn today than yesterday. No family was at bedside today. Monitor his mentation when they arrive.     Objective   Objective   Vital Signs  Temp:  [98.1 °F (36.7 °C)-98.4 °F (36.9 °C)] 98.2 °F (36.8 °C)  Heart Rate:  [93] 93  Resp:  [18] 18  BP: (131-148)/(65-80) 131/65     on   ;   Device (Oxygen Therapy): room air  Body mass index is 24.7 kg/m².    Physical Exam  Vitals and nursing note reviewed.   Constitutional:       General: He is not in acute distress.     Appearance: He is ill-appearing. He is not diaphoretic.   HENT:      Head: Atraumatic.      Nose:      Comments: NG  Cardiovascular:      Rate and Rhythm: Normal rate and regular rhythm.      Pulses: Normal pulses.   Pulmonary:      Effort: Pulmonary effort is normal.      Breath sounds: Rhonchi present. No wheezing.   Abdominal:      General: There is no distension.      Palpations: Abdomen is soft.      Tenderness: There is no abdominal tenderness. There is no guarding or rebound.   Musculoskeletal:         General: No tenderness.      Right lower leg: No edema.      Left lower leg: No edema.   Skin:     General: Skin is warm and dry.   Neurological:      Mental Status: He is alert.      Cranial Nerves: No cranial nerve deficit.      Motor: Weakness present.   Psychiatric:         Mood and Affect: Mood normal.         Behavior: Behavior is withdrawn.       Results Review  I reviewed the patient's new clinical results.    Results from last 7 days   Lab Units 23  0734 23  0743 23  0738 23  0854   WBC 10*3/mm3 17.97* 15.04* 16.63* 17.16*   HEMOGLOBIN g/dL 10.8* 10.8* 11.1* 11.9*   PLATELETS 10*3/mm3 265 197 187 159      Results from last 7 days   Lab Units 11/05/23  0734 11/04/23  0743 11/03/23  1336 11/02/23  0854   SODIUM mmol/L 136 136 134* 134*   POTASSIUM mmol/L 4.0 4.3 4.6 4.3   CHLORIDE mmol/L 103 103 100 100   CO2 mmol/L 27.0 25.0 23.8 24.0   BUN mg/dL 35* 39* 42* 31*   CREATININE mg/dL 0.70* 0.68* 0.83 0.73*   GLUCOSE mg/dL 156* 160* 250* 263*   EGFR mL/min/1.73 94.3 95.1 89.6 93.1     Results from last 7 days   Lab Units 11/05/23  0734 11/04/23  0743 11/03/23  1336 11/02/23  0854   ALBUMIN g/dL 2.6* 2.4* 2.5* 2.5*   BILIRUBIN mg/dL 0.9 0.6 0.6 0.5   ALK PHOS U/L 123* 124* 145* 112   AST (SGOT) U/L 39 37 56* 38   ALT (SGPT) U/L 53* 55* 67* 50*     Results from last 7 days   Lab Units 11/05/23  0734 11/04/23  2231 11/04/23 0743 11/03/23  1336 11/02/23  0854 10/31/23  0541 10/30/23  0648   CALCIUM mg/dL 8.3*  --  8.1* 8.1* 8.5*   < >  --    ALBUMIN g/dL 2.6*  --  2.4* 2.5* 2.5*   < >  --    MAGNESIUM mg/dL 1.9  --  2.1  --  2.1  --  2.2   PHOSPHORUS mg/dL  --  2.8 2.2*  --   --   --  2.3*    < > = values in this interval not displayed.     Results from last 7 days   Lab Units 11/02/23  0854   PROCALCITONIN ng/mL 0.76*     Hemoglobin A1C   Date/Time Value Ref Range Status   11/04/2023 0743 5.50 4.80 - 5.60 % Final     Glucose   Date/Time Value Ref Range Status   11/05/2023 1059 165 (H) 70 - 130 mg/dL Final   11/05/2023 0617 148 (H) 70 - 130 mg/dL Final   11/05/2023 0011 100 70 - 130 mg/dL Final   11/04/2023 1611 179 (H) 70 - 130 mg/dL Final   11/04/2023 1118 150 (H) 70 - 130 mg/dL Final   11/04/2023 0630 143 (H) 70 - 130 mg/dL Final   11/04/2023 0032 148 (H) 70 - 130 mg/dL Final       No radiology results for the last day    I have personally reviewed all medications:  Scheduled Medications  amitriptyline, 50 mg, Oral, Nightly  amLODIPine, 5 mg, Oral, Daily  [Held by provider] apixaban, 5 mg, Oral, BID  cefepime, 2,000 mg, Intravenous, Q8H  dexAMETHasone, 1 mg, Oral, Daily With Breakfast  furosemide, 20 mg, Oral,  Daily  HYDROcodone-acetaminophen, 1 tablet, Oral, Nightly  insulin regular, 2-9 Units, Subcutaneous, Q6H  lansoprazole, 30 mg, Nasogastric, QAM AC  Menthol-Zinc Oxide, 1 application , Topical, 4x Daily  metroNIDAZOLE, 500 mg, Intravenous, Q8H  oxybutynin XL, 5 mg, Oral, Daily  sucralfate, 1 g, Oral, BID AC      Infusions     Diet  NPO Diet NPO Type: Strict NPO    I have personally reviewed:  [x]  Laboratory   []  Microbiology   []  Radiology   [x]  EKG/Telemetry  []  Cardiology/Vascular   []  Pathology    []  Records       Assessment/Plan     Active Hospital Problems    Diagnosis  POA    **COVID-19 [U07.1]  Yes    Polycythemia [D75.1]  Yes    Chronic anticoagulation [Z79.01]  Not Applicable    PAD (peripheral artery disease) [I73.9]  Yes    Stenosis of carotid artery [I65.29]  Yes    S/P ablation of atrial flutter [Z98.890, Z86.79]  Not Applicable    S/P CABG (coronary artery bypass graft) [Z95.1]  Not Applicable    Coronary artery disease due to lipid rich plaque [I25.10, I25.83]  Yes    HLD (hyperlipidemia) [E78.5]  Yes    Benign essential hypertension [I10]  Yes    Cerebrovascular accident [I63.9]  Yes      Resolved Hospital Problems   No resolved problems to display.       77 y.o. male admitted with COVID-19.    COVID-19 and aspiration pneumonia: He completed initial antibiotic course and this has been resumed with cefepime and Flagyl.  He does have bilateral pneumonia.   He received 3 doses of remdesivir this admission.  It was stopped due to elevating LFTs.  Completed acute dexamethasone course.  No bronchospasm today.  Tapering steroid. Pulmonology following.  Acute hypoxic respiratory failure resolved  Dysphagia: Had aspiration during the VFSS.  Continue tube feeds.  Surgery following.  PEG planned.  Atrial fibrillation/history of ablation/CAD: Cardiology evaluated. Continuing medical management including Eliquis (held for procedure).  Regular rhythm with PVC on telemetry.  Hypertension: Acceptable acutely.   Continue current regimen  History of stroke: Eliquis on hold for procedure.  Statin held due to elevated LFT.  LFTs are improving. Potentially resume statin in next few days.  HLD: See above  Chronic pain: Continue pain control.  Polycythemia: Hydrea was stopped.  Patient does not have elevated hemoglobin currently.  Hyperglycemia on steroid: A1c 5.5.  Continue regular insulin with tube feeds.  Weaning steroid as above.  Deconditioning: Continue PT.  PPX: AC as above  Disposition: SNF/several days    Discussed with Dr Daly    Expected Discharge Date: 11/8/2023; Expected Discharge Time:      Michael Griffin MD  [facility]  11/05/23  14:01 EST    Dictated portions of note using Dragon dictation software.  Copied text in this note has been reviewed by me and remains accurate as of 11/05/23

## 2023-11-05 NOTE — PROGRESS NOTES
IMPRESSION & PLAN:  77-year-old gentleman with ongoing dysphagia.  I discussed PEG tube placement with him and his daughter at the bedside today.  He is in agreement with proceeding. We will hold his tube feeds starting at midnight.  I have placed him on the schedule for tomorrow with my partner Dr. Ramsey.  Timing of the procedure tomorrow to be determined.  Continue to hold Eliquis.  It can likely be resumed tomorrow evening versus Tuesday morning pending findings at the time of the procedure.    CC:    Chief Complaint   Patient presents with    Fall         HPI: He is ready to move ahead with PEG tube placement.  He has no additional questions currently.    ROS:   Generalized weakness      PE:    VS:   Vitals:    11/05/23 1305   BP: 131/65   Pulse:    Resp: 18   Temp: 98.2 °F (36.8 °C)   SpO2:       CONST: Awake, alert  LUNGS: symmetric excursion, normal inspiratory effort  CV: regular rate, well perfused  Abdomen: Soft, nondistended    LABS:  CBC          11/3/2023    07:38 11/4/2023    07:43 11/5/2023    07:34   CBC   WBC 16.63  15.04  17.97    RBC 2.97  2.85  2.88    Hemoglobin 11.1  10.8  10.8    Hematocrit 33.2  31.7  32.1    .8  111.2  111.5    MCH 37.4  37.9  37.5    MCHC 33.4  34.1  33.6    RDW 13.1  12.8  13.1    Platelets 187  197  265

## 2023-11-05 NOTE — PLAN OF CARE
Goal Outcome Evaluation:           Progress: improving  Outcome Evaluation: Room air, VSS. No complaints of pain. Tube feeds continue at goal rate with flushes. Incontinence care and frequent repositioning. A&Ox4. Was able to sit in chair for two hours overnight. stand and pivot with ast x 2 and gaitbelt. In much better spirits after sitting in chair. Possible Peg placement monday. Plan for Gen surgery to revisit today. No narcotics given per family request.

## 2023-11-05 NOTE — PLAN OF CARE
Goal Outcome Evaluation:  Plan of Care Reviewed With: patient        Progress: no change  Outcome Evaluation: Pt without omplaints of pain. VSS. NPO except tube feeds. Probable PEG placement in am. TF to hold at MN. Buttocks reddened and calmoseptine applied. Turned as tolerated. Still coughing up secretions. WCTM.

## 2023-11-06 ENCOUNTER — ANESTHESIA EVENT (OUTPATIENT)
Dept: GASTROENTEROLOGY | Facility: HOSPITAL | Age: 78
End: 2023-11-06
Payer: MEDICARE

## 2023-11-06 ENCOUNTER — TELEPHONE (OUTPATIENT)
Dept: ONCOLOGY | Facility: CLINIC | Age: 78
End: 2023-11-06
Payer: MEDICARE

## 2023-11-06 ENCOUNTER — ANESTHESIA (OUTPATIENT)
Dept: GASTROENTEROLOGY | Facility: HOSPITAL | Age: 78
End: 2023-11-06
Payer: MEDICARE

## 2023-11-06 ENCOUNTER — APPOINTMENT (OUTPATIENT)
Dept: GENERAL RADIOLOGY | Facility: HOSPITAL | Age: 78
End: 2023-11-06
Payer: MEDICARE

## 2023-11-06 LAB
ALBUMIN SERPL-MCNC: 2.5 G/DL (ref 3.5–5.2)
ALBUMIN/GLOB SERPL: 0.9 G/DL
ALP SERPL-CCNC: 104 U/L (ref 39–117)
ALT SERPL W P-5'-P-CCNC: 48 U/L (ref 1–41)
ANION GAP SERPL CALCULATED.3IONS-SCNC: 8 MMOL/L (ref 5–15)
AST SERPL-CCNC: 44 U/L (ref 1–40)
BILIRUB SERPL-MCNC: 1.5 MG/DL (ref 0–1.2)
BUN SERPL-MCNC: 27 MG/DL (ref 8–23)
BUN/CREAT SERPL: 42.9 (ref 7–25)
CALCIUM SPEC-SCNC: 8.3 MG/DL (ref 8.6–10.5)
CHLORIDE SERPL-SCNC: 103 MMOL/L (ref 98–107)
CO2 SERPL-SCNC: 26 MMOL/L (ref 22–29)
CREAT SERPL-MCNC: 0.63 MG/DL (ref 0.76–1.27)
DEPRECATED RDW RBC AUTO: 54.3 FL (ref 37–54)
EGFRCR SERPLBLD CKD-EPI 2021: 97.4 ML/MIN/1.73
ERYTHROCYTE [DISTWIDTH] IN BLOOD BY AUTOMATED COUNT: 13.1 % (ref 12.3–15.4)
GLOBULIN UR ELPH-MCNC: 2.9 GM/DL
GLUCOSE BLDC GLUCOMTR-MCNC: 114 MG/DL (ref 70–130)
GLUCOSE BLDC GLUCOMTR-MCNC: 95 MG/DL (ref 70–130)
GLUCOSE BLDC GLUCOMTR-MCNC: 95 MG/DL (ref 70–130)
GLUCOSE BLDC GLUCOMTR-MCNC: 97 MG/DL (ref 70–130)
GLUCOSE SERPL-MCNC: 98 MG/DL (ref 65–99)
HCT VFR BLD AUTO: 31.7 % (ref 37.5–51)
HGB BLD-MCNC: 10.7 G/DL (ref 13–17.7)
MAGNESIUM SERPL-MCNC: 2.2 MG/DL (ref 1.6–2.4)
MCH RBC QN AUTO: 37.8 PG (ref 26.6–33)
MCHC RBC AUTO-ENTMCNC: 33.8 G/DL (ref 31.5–35.7)
MCV RBC AUTO: 112 FL (ref 79–97)
PHOSPHATE SERPL-MCNC: 2.7 MG/DL (ref 2.5–4.5)
PLATELET # BLD AUTO: 292 10*3/MM3 (ref 140–450)
PMV BLD AUTO: 11.6 FL (ref 6–12)
POTASSIUM SERPL-SCNC: 4 MMOL/L (ref 3.5–5.2)
PROCALCITONIN SERPL-MCNC: 0.53 NG/ML (ref 0–0.25)
PROT SERPL-MCNC: 5.4 G/DL (ref 6–8.5)
RBC # BLD AUTO: 2.83 10*6/MM3 (ref 4.14–5.8)
SODIUM SERPL-SCNC: 137 MMOL/L (ref 136–145)
WBC NRBC COR # BLD: 18.14 10*3/MM3 (ref 3.4–10.8)

## 2023-11-06 PROCEDURE — 25010000002 METRONIDAZOLE 500 MG/100ML SOLUTION: Performed by: INTERNAL MEDICINE

## 2023-11-06 PROCEDURE — 43246 EGD PLACE GASTROSTOMY TUBE: CPT | Performed by: SURGERY

## 2023-11-06 PROCEDURE — 84100 ASSAY OF PHOSPHORUS: CPT | Performed by: INTERNAL MEDICINE

## 2023-11-06 PROCEDURE — 84145 PROCALCITONIN (PCT): CPT | Performed by: INTERNAL MEDICINE

## 2023-11-06 PROCEDURE — 80053 COMPREHEN METABOLIC PANEL: CPT | Performed by: HOSPITALIST

## 2023-11-06 PROCEDURE — 94799 UNLISTED PULMONARY SVC/PX: CPT

## 2023-11-06 PROCEDURE — 0DH63UZ INSERTION OF FEEDING DEVICE INTO STOMACH, PERCUTANEOUS APPROACH: ICD-10-PCS | Performed by: HOSPITALIST

## 2023-11-06 PROCEDURE — 25810000003 LACTATED RINGERS PER 1000 ML: Performed by: ANESTHESIOLOGY

## 2023-11-06 PROCEDURE — 25010000002 PROPOFOL 10 MG/ML EMULSION: Performed by: ANESTHESIOLOGY

## 2023-11-06 PROCEDURE — 25810000003 SODIUM CHLORIDE 0.9 % SOLUTION: Performed by: SURGERY

## 2023-11-06 PROCEDURE — 99024 POSTOP FOLLOW-UP VISIT: CPT | Performed by: SURGERY

## 2023-11-06 PROCEDURE — 82948 REAGENT STRIP/BLOOD GLUCOSE: CPT

## 2023-11-06 PROCEDURE — 99221 1ST HOSP IP/OBS SF/LOW 40: CPT | Performed by: NURSE PRACTITIONER

## 2023-11-06 PROCEDURE — 3E0G76Z INTRODUCTION OF NUTRITIONAL SUBSTANCE INTO UPPER GI, VIA NATURAL OR ARTIFICIAL OPENING: ICD-10-PCS | Performed by: HOSPITALIST

## 2023-11-06 PROCEDURE — 25010000002 CEFEPIME PER 500 MG: Performed by: INTERNAL MEDICINE

## 2023-11-06 PROCEDURE — 94761 N-INVAS EAR/PLS OXIMETRY MLT: CPT

## 2023-11-06 PROCEDURE — 71045 X-RAY EXAM CHEST 1 VIEW: CPT

## 2023-11-06 PROCEDURE — 83735 ASSAY OF MAGNESIUM: CPT | Performed by: INTERNAL MEDICINE

## 2023-11-06 PROCEDURE — 85027 COMPLETE CBC AUTOMATED: CPT | Performed by: HOSPITALIST

## 2023-11-06 RX ORDER — SODIUM CHLORIDE, SODIUM LACTATE, POTASSIUM CHLORIDE, CALCIUM CHLORIDE 600; 310; 30; 20 MG/100ML; MG/100ML; MG/100ML; MG/100ML
INJECTION, SOLUTION INTRAVENOUS CONTINUOUS PRN
Status: DISCONTINUED | OUTPATIENT
Start: 2023-11-06 | End: 2023-11-06 | Stop reason: SURG

## 2023-11-06 RX ORDER — SODIUM CHLORIDE 9 MG/ML
1000 INJECTION, SOLUTION INTRAVENOUS CONTINUOUS
Status: ACTIVE | OUTPATIENT
Start: 2023-11-06 | End: 2023-11-08

## 2023-11-06 RX ADMIN — CEFEPIME 2000 MG: 2 INJECTION, POWDER, FOR SOLUTION INTRAVENOUS at 21:30

## 2023-11-06 RX ADMIN — AMLODIPINE BESYLATE 5 MG: 5 TABLET ORAL at 08:16

## 2023-11-06 RX ADMIN — CEFEPIME 2000 MG: 2 INJECTION, POWDER, FOR SOLUTION INTRAVENOUS at 17:00

## 2023-11-06 RX ADMIN — PROPOFOL 109.65 MCG/KG/MIN: 10 INJECTION, EMULSION INTRAVENOUS at 15:21

## 2023-11-06 RX ADMIN — Medication 1 APPLICATION: at 18:31

## 2023-11-06 RX ADMIN — HYDROCODONE BITARTRATE AND ACETAMINOPHEN 1 TABLET: 10; 325 TABLET ORAL at 08:21

## 2023-11-06 RX ADMIN — AMITRIPTYLINE HYDROCHLORIDE 50 MG: 50 TABLET, FILM COATED ORAL at 21:30

## 2023-11-06 RX ADMIN — Medication 1 APPLICATION: at 12:19

## 2023-11-06 RX ADMIN — Medication 1 APPLICATION: at 21:32

## 2023-11-06 RX ADMIN — DEXAMETHASONE 1 MG: 2 TABLET ORAL at 08:16

## 2023-11-06 RX ADMIN — METRONIDAZOLE 500 MG: 500 INJECTION, SOLUTION INTRAVENOUS at 17:00

## 2023-11-06 RX ADMIN — CEFEPIME 2000 MG: 2 INJECTION, POWDER, FOR SOLUTION INTRAVENOUS at 06:56

## 2023-11-06 RX ADMIN — SODIUM CHLORIDE 1000 ML: 9 INJECTION, SOLUTION INTRAVENOUS at 14:56

## 2023-11-06 RX ADMIN — OXYBUTYNIN CHLORIDE 5 MG: 5 TABLET, EXTENDED RELEASE ORAL at 08:17

## 2023-11-06 RX ADMIN — FUROSEMIDE 20 MG: 20 TABLET ORAL at 08:16

## 2023-11-06 RX ADMIN — METRONIDAZOLE 500 MG: 500 INJECTION, SOLUTION INTRAVENOUS at 06:57

## 2023-11-06 RX ADMIN — HYDROCODONE BITARTRATE AND ACETAMINOPHEN 1 TABLET: 10; 325 TABLET ORAL at 18:31

## 2023-11-06 RX ADMIN — Medication 1 APPLICATION: at 08:17

## 2023-11-06 RX ADMIN — SODIUM CHLORIDE, POTASSIUM CHLORIDE, SODIUM LACTATE AND CALCIUM CHLORIDE: 600; 310; 30; 20 INJECTION, SOLUTION INTRAVENOUS at 15:18

## 2023-11-06 NOTE — ANESTHESIA POSTPROCEDURE EVALUATION
Patient: Madhu Santoro    Procedure Summary       Date: 11/06/23 Room / Location:  [facility]    Anesthesia Start: 1518 Anesthesia Stop: 1556    Procedure: ESOPHAGOGASTRODUODENOSCOPY WITH PERCUTANEOUS ENDOSCOPIC GASTROSTOMY TUBE INSERTION (Esophagus) Diagnosis:     Surgeons: Temo Ramsey MD Provider: Jordon Doan MD    Anesthesia Type: MAC ASA Status: 4            Anesthesia Type: MAC    Vitals  Vitals Value Taken Time   /57 11/06/23 1550   Temp     Pulse 87 11/06/23 1550   Resp 20 11/06/23 1550   SpO2 98 % 11/06/23 1550           Post Anesthesia Care and Evaluation    Patient location during evaluation: bedside  Patient participation: complete - patient cannot participate  Level of consciousness: responsive to verbal stimuli and responsive to painful stimuli  Pain management: adequate    Airway patency: patent  Anesthetic complications: No anesthetic complications    Cardiovascular status: acceptable  Respiratory status: unstable and airway suctioned  Hydration status: acceptable    Comments: Patient back to baseline with G-tube now in place:  Pneumonia, thick phlegm, covid +  Thick secretions cleared after procedure  Post op nasal cannula and prn suctioning advised as needed post procedure as instructed to CLAUDIO Leung nodfiona awareness that procedure completed and efforts to clear any phlegm needed prn   Difficult situation over all for patient

## 2023-11-06 NOTE — SIGNIFICANT NOTE
11/06/23 1404   OTHER   Discipline physical therapist   Rehab Time/Intention   Session Not Performed other (see comments)  (Patient going off the floor for PEG placement. PT will f/u.)   Recommendation   PT - Next Appointment 11/07/23

## 2023-11-06 NOTE — PROGRESS NOTES
"                                              LOS: 12 days   Patient Care Team:  Damian Sanchez MD as PCP - General (Family Medicine)  Jr Temo Cortez MD as Surgeon (Cardiothoracic Surgery)  Netta Mayorga Jr., MD as Consulting Physician (Vascular Surgery)  Damian Sanchez MD as Referring Physician (Family Medicine)  Aquiles Lopez MD as Consulting Physician (Hematology and Oncology)  Christy Luther APRN as Nurse Practitioner (Nurse Practitioner)  Damian Sanchez MD as Referring Physician (Family Medicine)    Chief Complaint:  F/up pneumonia, COVID infection    Subjective   Interval History  I reviewed the admission note, progress notes, PMH, PSH, Family hx, social history, imagings and prior records on this admission, summarized the finding in my note and formulated a transition of care plan.      On RA.  SPO2 up to 97%.  He has no complaints.  Tube feeding noted.    REVIEW OF SYSTEMS:   CARDIOVASCULAR: No chest pain, chest pressure or chest discomfort. No palpitations or edema.   RESPIRATORY: No shortness of breath, cough or sputum.   GASTROINTESTINAL: No anorexia, nausea, vomiting or diarrhea. No abdominal pain.  CONSTITUTIONAL: No fever or chills.     Ventilator/Non-Invasive Ventilation Settings (From admission, onward)      None                  Physical Exam:     Vital Signs  Temp:  [97.5 °F (36.4 °C)-99.3 °F (37.4 °C)] 98.4 °F (36.9 °C)  Heart Rate:  [85-93] 85  Resp:  [18-24] 24  BP: (115-141)/(60-70) 135/63    Intake/Output Summary (Last 24 hours) at 11/6/2023 1104  Last data filed at 11/5/2023 1800  Gross per 24 hour   Intake 1938 ml   Output --   Net 1938 ml     Flowsheet Rows      Flowsheet Row First Filed Value   Admission Height 172.7 cm (68\") Documented at 10/23/2023 0536   Admission Weight 78.8 kg (173 lb 12.8 oz) Documented at 10/23/2023 0536            PPE used per hospital policy    General Appearance:   Alert, cooperative, in no acute distress   ENMT:  Mallampati score 3, dry " mucous membrane   Eyes:  Pupils equal and reactive to light. EOMI   Neck:   Trachea midline. No thyromegaly.   Lungs:    Clear to auscultation,respirations regular, even and nonlabored    Heart:   Regular rhythm and normal rate, normal S1 and S2, no         murmur   Skin:   No rash or ecchymosis   Abdomen:    Soft. No tenderness. No HSM.   Neuro/psych:  Conscious, alert, oriented x3. Strength 4/5 in upper and lower  ext.  Appropriate mood and affect   Extremities:  No cyanosis, clubbing but mild edema in distal legs.  Warm extremities and well-perfused          Results Review:        Results from last 7 days   Lab Units 11/06/23  0707 11/05/23  0734 11/04/23  0743   SODIUM mmol/L 137 136 136   POTASSIUM mmol/L 4.0 4.0 4.3   CHLORIDE mmol/L 103 103 103   CO2 mmol/L 26.0 27.0 25.0   BUN mg/dL 27* 35* 39*   CREATININE mg/dL 0.63* 0.70* 0.68*   GLUCOSE mg/dL 98 156* 160*   CALCIUM mg/dL 8.3* 8.3* 8.1*     Results from last 7 days   Lab Units 11/02/23  1735 11/02/23  1118 11/02/23  0854   HSTROP T ng/L 12 19* 16*     Results from last 7 days   Lab Units 11/06/23  0707 11/05/23  0734 11/04/23  0743   WBC 10*3/mm3 18.14* 17.97* 15.04*   HEMOGLOBIN g/dL 10.7* 10.8* 10.8*   HEMATOCRIT % 31.7* 32.1* 31.7*   PLATELETS 10*3/mm3 292 265 197                                   I reviewed the patient's new clinical results.        Medication Review:   amitriptyline, 50 mg, Oral, Nightly  amLODIPine, 5 mg, Oral, Daily  [Held by provider] apixaban, 5 mg, Oral, BID  cefepime, 2,000 mg, Intravenous, Q8H  dexAMETHasone, 1 mg, Oral, Daily With Breakfast  furosemide, 20 mg, Oral, Daily  HYDROcodone-acetaminophen, 1 tablet, Oral, Nightly  insulin regular, 2-9 Units, Subcutaneous, Q6H  lansoprazole, 30 mg, Nasogastric, QAM AC  Menthol-Zinc Oxide, 1 application , Topical, 4x Daily  metroNIDAZOLE, 500 mg, Intravenous, Q8H  oxybutynin XL, 5 mg, Oral, Daily  sucralfate, 1 g, Oral, BID AC             Diagnostic imaging:  I personally and  independently reviewed the following images:    CT chest 11/30/2023: LLL consolidation.  Mild nodular infiltrates in the RLL..  Otherwise exam limited due to motion artifact.      Assessment     COVID-19 pneumonia  Bilateral lower lobe consolidations, L>R, likely bacterial pneumonia  Dysphagia    A-fib  CAD s/p CABG  HTN  Chronic pain    All problems new to me    Plan       Decadron for total of 10 days.  Noted currently being weaned at 1 mg daily for 2 doses  Antibiotics with cefepime and Flagyl for aspiration pneumonia.  Anticoagulation with Eliquis  Incentive spirometry  Tube feeding.  If dysphagia is persistent then may require PEG tube.  Repeat CXR or CT chest as outpatient in about 1 month to ensure resolution of the pneumonia.    Ralph Bianchi MD  11/06/23  11:04 EST          This note was dictated utilizing Dragon dictation

## 2023-11-06 NOTE — ANESTHESIA PREPROCEDURE EVALUATION
Anesthesia Evaluation                  Airway   Mallampati: II  TM distance: >3 FB  Neck ROM: full  Dental    (+) upper dentures and partials    Pulmonary    (+) pneumonia , a smoker Former, COPD,  Cardiovascular     Rhythm: irregular  Rate: normal    (+) hypertension, CAD, CABG, dysrhythmias, PVD, hyperlipidemia,  carotid artery disease      Neuro/Psych  (+) CVA, weakness, psychiatric history Anxiety and Depression  GI/Hepatic/Renal/Endo    (+) GERD, renal disease- stones    Musculoskeletal     Abdominal    Substance History      OB/GYN          Other   arthritis, blood dyscrasia,   history of cancer                  Anesthesia Plan    ASA 4     MAC     (Covid +  AFib in the past (s/p ablation) currently in NSR with ventricular trigeminy)  intravenous induction     Anesthetic plan, risks, benefits, and alternatives have been provided, discussed and informed consent has been obtained with: patient.    CODE STATUS:    Code Status (Patient has no pulse and is not breathing): CPR (Attempt to Resuscitate)  Medical Interventions (Patient has pulse or is breathing): Full Support

## 2023-11-06 NOTE — SIGNIFICANT NOTE
11/06/23 1505   OTHER   Discipline occupational therapist   Rehab Time/Intention   Session Not Performed other (see comments)  (Pt going ERVIN for Peg 2 placement. OT will f/u tomorrow 11/7.)   Therapy Assessment/Plan (PT)   Criteria for Skilled Interventions Met (PT) skilled treatment is necessary   Recommendation   OT - Next Appointment 11/07/23

## 2023-11-06 NOTE — PROGRESS NOTES
Colorectal & General Surgery  Progress Note    Patient: Madhu Santoro  YOB: 1945  MRN: [id number]      Assessment  Madhu Santoro is a 77 y.o. male with dysphagia who presents for EGD and PEG placement. No changes. Eliquis held.     Plan  EGD with PEG today      Subjective  Naeo. Feels well.     Objective    Vitals:    11/06/23 1452   BP: 146/85   Pulse: 92   Resp: 18   Temp:    SpO2: 94%       Physical Exam  Constitutional: Well-developed well-nourished, no acute distress  Neck: Supple, trachea midline  Respiratory: No increased work of breathing, Symmetric excursion  Cardiovascular: Well pefursed, no jugular venous distention evident   Abdominal: Soft, non-tender, non-distended  Skin: Warm, dry, no rash on visualized skin surfaces  Psychiatric: Alert and oriented ×3, normal affect     Laboratory Results  I have personally reviewed CBC with WBC 18, Hgb 10, plt 292. Procal 0.5. CMP with creatine 0.63, albumin 2.5.     Radiology  I have personally reviewed CT chest with stomach opposed to midline abdominal wall.         Vito Ramsey MD  Colorectal & General Surgery  [facility]    [address], [address]  [place], [place], [address]  P: [phone]  F: [phone]

## 2023-11-06 NOTE — PROGRESS NOTES
Attempted to see patient but off the floor for endoscopy/PEG will try again later today.  Notes and recommendations reviewed  Electronically signed by Hayden Doll MD, 11/06/23, 3:25 PM EST.

## 2023-11-06 NOTE — PLAN OF CARE
Goal Outcome Evaluation:        PEG tube placed this afternoon and Cortrak removed. Tube feeds to resume this evening. VSS.

## 2023-11-06 NOTE — CONSULTS
.            [facility]    Palliative Care Initial Consult   Attending Physician: Hayden Doll MD  Referring Provider: Dr Griffin    Reason for Referral: assistance with clarification of goals of care  Family/Support: spouse and children     Code Status and Medical Interventions:   Ordered at: 10/23/23 1311     Code Status (Patient has no pulse and is not breathing):    CPR (Attempt to Resuscitate)     Medical Interventions (Patient has pulse or is breathing):    Full Support     Goals of Care: To have PEG placed, go to rehab and then return to home setting.     HPI:   77 y.o. male with history of hypertension, atrial fibrillation, stroke, coronary artery disease s/p CABG,  polycythemia vera, gout, chronic pain/opioid dependence. He resided at home prior to admission.   Patient presented to [facility] on 10/23/2023 related to generalized weakness and a mechanical fall. Workup in ER, revealed COVID 19 infection. He was started on remdesivir. His hospital course was complicated with hypoxia, secondary bacterial pneumonia, dysphagia. He was seen by pulmonology, ENT, cardiology, and surgery. Unfortunately his dysphagia didn't improve  (VFSS on 11/1/2023) and he is going for PEG placement later today.  He has been treated with cefepime and flagyl IV. The palliative care team was consulted for support with goals of care conversation due to prolonged hospitalization.   Palliative Care Spoke With: patient, family, and HCS  Quality of life: fair  Functional Status: . Required mod A x1 for supine to sit, then maintained sitting balance w/ mostly SBA. Pt completed a few exercises while sitting EOB, then stood twice, requiring mod-max A x2. Unable to take side steps towards HOB today. Pt returned to supine w/ max A x2 per PT notes on 11/3/2023  Due to the Palliative Care Topics Discussed: palliative care, goals of care, care options, and discharge options we will  "establish an advance care plan.   Advance Care Planning   Advance Care Planning Discussion: see below          Review of Systems   Constitutional: Positive for decreased appetite and malaise/fatigue.   Cardiovascular:  Negative for chest pain.   Respiratory:  Negative for shortness of breath.    Gastrointestinal:  Negative for abdominal pain and nausea.   Neurological:  Positive for weakness.   Psychiatric/Behavioral:  Negative for depression. The patient is not nervous/anxious.        1- Pain Assessment  Nonverbal Indicators of Pain: nonverbal indicators absent  CPOT Facial Expression: 0-->relaxed, neutral  CPOT Body Movements: 0-->absence of movements  CPOT Muscle Tension: 0-->relaxed  Ventilator Compliance/Vocalization: 0-->talking in normal tone or no sound  CPOT Score: 0  Pain Location: extremity  Pain Description: aching    Past Medical History:   Diagnosis Date    Anxiety     Arthritis     Atrial flutter     Status post cavotricuspid isthmus ablation by Dr. Gustafson on 4/18/17    Mandel esophagus     Benign essential hypertension     CAD (coronary artery disease)     3 vessel CABG 4/11/17 by Dr. Cortez: ROSARIO-prox LAD, SVG-OM1, SVG-OM3    Carotid artery disease     Status post carotid endarterectomy - [facility] 4/10/17: 50-59% NICHELLE, 1-15% LICA.     Colonic polyp     Cyst of pancreas     DDD (degenerative disc disease), lumbosacral     GERD (gastroesophageal reflux disease)     H/O bone density study 2013    H/O complete eye exam 2014    History of kidney stone     HLD (hyperlipidemia)     Hypertension     Kidney stone     8/22/22    Lipid screening 05/31/2013    Low back pain     [facility]    Screening for prostate cancer 07/07/2015    Skin cancer     nose    Stroke     RESIDUAL--\"BALANCE ISSUES\"     Past Surgical History:   Procedure Laterality Date    CARDIAC CATHETERIZATION N/A 04/10/2017    Procedure: Left Heart Cath;  Surgeon: Marjorie Healy MD;  Location: [facility] CATH INVASIVE " LOCATION;  Service:     CARDIAC CATHETERIZATION N/A 04/10/2017    Procedure: Coronary angiography;  Surgeon: Marjorie Healy MD;  Location: [facility] CATH INVASIVE LOCATION;  Service:     CARDIAC CATHETERIZATION N/A 04/10/2017    Procedure: Left ventriculography;  Surgeon: Marjorie Healy MD;  Location: [facility] CATH INVASIVE LOCATION;  Service:     CARDIAC CATHETERIZATION  2011    CARDIAC ELECTROPHYSIOLOGY PROCEDURE N/A 04/18/2017    Procedure: Ablation atrial flutter;  Surgeon: Jose Antonio Gustafson MD;  Location: [facility] CATH INVASIVE LOCATION;  Service:     CAROTID ENDARTERECTOMY      CHOLECYSTECTOMY      CHOLECYSTECTOMY WITH INTRAOPERATIVE CHOLANGIOGRAM N/A 09/07/2019    Procedure: Laparoscopic cholecystectomy with intraoperative cholangiogram;  Surgeon: Gauri Travis MD;  Location: [facility] MAIN OR;  Service: General    COLONOSCOPY  01/06/2015    Diverticulosis, one TA    COLONOSCOPY N/A 02/14/2019    tics, NBIH, adenomatous polyp x 2    COLONOSCOPY N/A 11/05/2021    Procedure: COLONOSCOPY TO CECUM AND TERM. ILEUM WITH COLD POLYPECTOMIES;  Surgeon: Everton Abel MD;  Location: [facility] ENDOSCOPY;  Service: Gastroenterology;  Laterality: N/A;  PRE OP - PERS H/O POLYPS  POST OP - COLON POLYPS,, DIVERTICULOSIS, HEMORRHOIDS    CORONARY ARTERY BYPASS GRAFT N/A 04/11/2017    Procedure: AR STERNOTOMY CORONARY ARTERY BYPASS GRAFT TIMES 3 USING LEFT INTERNAL MAMMARY ARTERY AND LEFT GREATER SAPHENOUS VEIN GRAFT PER ENDOSCOPIC VEIN HARVESTING AND PRP ;  Surgeon: Temo Cortez MD;  Location: [facility] MAIN OR;  Service:     ENDOSCOPY  01/06/2015    HH, Ervin's esophagus    ENDOSCOPY N/A 02/14/2019    Z line irregular, HH, Ervin's esophagus    ENDOSCOPY N/A 11/05/2021    Procedure: ESOPHAGOGASTRODUODENOSCOPY WITH BIOPSIES;  Surgeon: Everton Abel MD;  Location: [facility] ENDOSCOPY;  Service: Gastroenterology;  Laterality: N/A;  PRE OP - PERS H/O ERVIN'S  POST OP - IRREG Z LINE    KNEE SURGERY Left     URETEROSCOPY LASER  LITHOTRIPSY WITH STENT INSERTION Left 2022    Procedure: Cysto retrograde with left uretro stent placement;  Surgeon: Damien Oliveira MD;  Location: [facility];  Service: Urology;  Laterality: Left;    VASECTOMY       Social History     Socioeconomic History    Marital status:      Spouse name: Brooke    Years of education: 9th grade   Tobacco Use    Smoking status: Former     Packs/day: 1     Types: Cigarettes     Start date: 1959     Quit date: 2009     Years since quittin.8    Smokeless tobacco: Never    Tobacco comments:     CAFFEINE USE   Vaping Use    Vaping Use: Never used   Substance and Sexual Activity    Alcohol use: Not Currently     Comment: rarely    Drug use: No    Sexual activity: Defer     Partners: Female       Current Facility-Administered Medications   Medication Dose Route Frequency Provider Last Rate Last Admin    acetaminophen (TYLENOL) tablet 650 mg  650 mg Oral Q4H PRN Saman Starr Jr., MD   650 mg at 10/25/23 0227    Or    acetaminophen (TYLENOL) 160 MG/5ML oral solution 650 mg  650 mg Oral Q4H PRN Saman Starr Jr., MD   650 mg at 23 0848    Or    acetaminophen (TYLENOL) suppository 650 mg  650 mg Rectal Q4H PRN Saman Starr Jr., MD        amitriptyline (ELAVIL) tablet 50 mg  50 mg Oral Nightly Saman Starr Jr., MD   50 mg at 23 2221    amLODIPine (NORVASC) tablet 5 mg  5 mg Oral Daily Shelby Rudd MD   5 mg at 23 0816    [Held by provider] apixaban (ELIQUIS) tablet 5 mg  5 mg Oral BID Saman Starr Jr., MD   5 mg at 23    benzocaine-menthol (CHLORASEPTIC) lozenge 1 lozenge  1 lozenge Mouth/Throat Q4H PRN Saman Starr Jr., MD        sennosides-docusate (PERICOLACE) 8.6-50 MG per tablet 2 tablet  2 tablet Oral BID PRN Saman Starr Jr., MD        And    polyethylene glycol (MIRALAX) packet 17 g  17 g Oral Daily PRN Saman Starr Jr., MD        And    bisacodyl  (DULCOLAX) EC tablet 5 mg  5 mg Oral Daily PRN Saman Starr Jr., MD        And    bisacodyl (DULCOLAX) suppository 10 mg  10 mg Rectal Daily PRN Saman Starr Jr., MD        Calcium Replacement - Follow Nurse / BPA Driven Protocol   Does not apply PRN Michael Griffin MD        cefepime 2000 mg IVPB in 100 ml NS (VTB)  2,000 mg Intravenous Q8H Albert Daly MD 20 mL/hr at 11/06/23 0656 2,000 mg at 11/06/23 0656    dexAMETHasone (DECADRON) tablet 1 mg  1 mg Oral Daily With Breakfast Michael Griffin MD   1 mg at 11/06/23 0816    dextrose (D50W) (25 g/50 mL) IV injection 25 g  25 g Intravenous Q15 Min PRN Everardo Sumner MD        dextrose (GLUTOSE) oral gel 15 g  15 g Oral Q15 Min PRN Everardo Sumner MD        furosemide (LASIX) tablet 20 mg  20 mg Oral Daily Everardo Sumner MD   20 mg at 11/06/23 0816    glucagon (GLUCAGEN) injection 1 mg  1 mg Intramuscular Q15 Min PRN Everardo Sumner MD        guaifenesin (ROBITUSSIN) 100 MG/5ML liquid 200 mg  200 mg Oral Q4H PRN Saman Starr Jr., MD        HYDROcodone-acetaminophen (NORCO)  MG per tablet 1 tablet  1 tablet Oral Q8H PRN Saman Starr Jr., MD   1 tablet at 11/06/23 0821    HYDROcodone-acetaminophen (NORCO) 5-325 MG per tablet 1 tablet  1 tablet Oral Q8H PRN Michael Griffin MD        HYDROcodone-acetaminophen (NORCO) 5-325 MG per tablet 1 tablet  1 tablet Oral Nightly Michael Griffin MD   1 tablet at 11/05/23 2221    insulin regular (humuLIN R,novoLIN R) injection 2-9 Units  2-9 Units Subcutaneous Q6H Everardo Sumner MD   2 Units at 11/05/23 1745    lansoprazole (PREVACID SOLUTAB) disintegrating tablet Tablet Delayed Release Dispersible 30 mg  30 mg Nasogastric QAM AC Everardo Sumner MD   30 mg at 11/05/23 0619    Magnesium Standard Dose Replacement - Follow Nurse / BPA Driven Protocol   Does not apply PRN Michael Griffin MD        Menthol-Zinc Oxide 1 application   1 application   Topical 4x Daily Everardo Sumner MD   1 application  at 11/06/23 0817    metroNIDAZOLE (FLAGYL) IVPB 500 mg  500 mg Intravenous Q8H Albert Daly  mL/hr at 11/06/23 0657 500 mg at 11/06/23 0657    nitroglycerin (NITROSTAT) SL tablet 0.4 mg  0.4 mg Sublingual Q5 Min PRN Saman Starr Jr., MD        ondansetron (ZOFRAN) tablet 4 mg  4 mg Oral Q6H PRN Saman Starr Jr., MD        Or    ondansetron (ZOFRAN) injection 4 mg  4 mg Intravenous Q6H PRN Saman Starr Jr., MD   4 mg at 11/01/23 2132    oxybutynin XL (DITROPAN-XL) 24 hr tablet 5 mg  5 mg Oral Daily Saman Starr Jr., MD   5 mg at 11/06/23 0817    phenol (CHLORASEPTIC) 1.4 % liquid 1 spray  1 spray Mouth/Throat Q2H PRN Saman Starr Jr., MD   1 spray at 10/27/23 0130    Phosphorus Replacement - Follow Nurse / BPA Driven Protocol   Does not apply PRN Michael Griffin MD        Potassium Replacement - Follow Nurse / BPA Driven Protocol   Does not apply PRN Michael Griffin MD        sodium chloride 0.9 % flush 10 mL  10 mL Intravenous PRN Saman Starr Jr., MD   10 mL at 11/03/23 2000    sodium chloride 0.9 % infusion 40 mL  40 mL Intravenous PRN Saman Starr Jr., MD        sucralfate (CARAFATE) tablet 1 g  1 g Oral BID AC Everardo Sumner MD   1 g at 11/05/23 1745          acetaminophen **OR** acetaminophen **OR** acetaminophen    benzocaine-menthol    senna-docusate sodium **AND** polyethylene glycol **AND** bisacodyl **AND** bisacodyl    Calcium Replacement - Follow Nurse / BPA Driven Protocol    dextrose    dextrose    glucagon (human recombinant)    guaifenesin    HYDROcodone-acetaminophen    HYDROcodone-acetaminophen    Magnesium Standard Dose Replacement - Follow Nurse / BPA Driven Protocol    nitroglycerin    ondansetron **OR** ondansetron    phenol    Phosphorus Replacement - Follow Nurse / BPA Driven Protocol    Potassium Replacement - Follow Nurse / BPA Driven Protocol     "[COMPLETED] Insert Peripheral IV **AND** sodium chloride    sodium chloride    Allergies   Allergen Reactions    Atorvastatin Myalgia     Myalgia    Penicillins Hives, Swelling and Rash     Tolerates cephalosporins      Attest that current medications reviewed  including but not limited to prescriptions, over-the counter, herbals and vitamin/mineral/dietary (nutritional) supplements for name, route of administration, type, dose and frequency and are current using all immediate resources available at time of dictation.      Intake/Output Summary (Last 24 hours) at 11/6/2023 1154  Last data filed at 11/5/2023 1800  Gross per 24 hour   Intake 1938 ml   Output --   Net 1938 ml       Physical Exam:    Diagnostics: Reviewed  /63 (BP Location: Left arm, Patient Position: Lying)   Pulse 85   Temp 98.4 °F (36.9 °C) (Oral)   Resp 24   Ht 172.7 cm (68\")   Wt 76 kg (167 lb 8.8 oz)   SpO2 94%   BMI 25.48 kg/m²     Constitutional:       Appearance: Not in distress. Acutely ill-appearing.      Comments: NGT left nare   Pulmonary:      Effort: Pulmonary effort is normal.      Breath sounds: Normal breath sounds.   Cardiovascular:      PMI at left midclavicular line. Normal rate. Regular rhythm.      Murmurs: There is no murmur.   Edema:     Peripheral edema absent.   Abdominal:      General: Bowel sounds are normal.      Palpations: Abdomen is soft.      Tenderness: There is no abdominal tenderness.   Neurological:      Mental Status: Alert and oriented to person, place, and time.   Psychiatric:         Mood and Affect: Mood and affect normal.         Speech: Speech normal.         Behavior: Behavior is cooperative.         Thought Content: Thought content normal.         Cognition and Memory: Cognition normal.         Judgment: Judgment normal.       Patient status: Disease state: Controlled with current treatments.  Functional status: Palliative Performance Scale Score: Performance 60% based on the following " measures: Ambulation: Reduced, Activity and Evidence of Disease: Unable to do hobby or some work, significant evidence of disease, Self-Care: Occasional assist necessary,  Intake: Normal or reduced, LOC: Full or confusion   Nutritional status: Albumin 2.5 on 11/6/2023 Body mass index is 25.48 kg/m².   Family support: The patient receives support from his wife and children..  Advance Directives: Advance Directive Status: Patient does not have advance directive   POA/Healthcare surrogate-spouse- Brooke.        Impression/Problem List:    COVID 19   Aspiration pneumonia   Dysphagia  Atrial fibrillation   Polycythemia  Coronary artery disease  Peripheral artery disease  Prior stroke         Recommendations/Plan:  1. Provide support with goals of care    .    2.  Palliative care encounter  I spoke with patient regarding goals of care.  He has very good understanding of his medical conditions, which we reviewed in detail. He expresses some frustration with prolonged hospitalization. He has made the decision to proceed with having PEG placed, which is scheduled for later this afternoon. He is hopeful that having the PEG will be short term and he will be able to eat food again. We did discuss comfort feeding and risk of aspiration if after therapy no improvement in dysphagia. He understands the importance of rehab (PT, OT, and ST) so that he can return to home setting. Prior to this hospitalization, he was independent with ADLs and still driving.  He does have the support of his spouse and four children.  I did provide information regarding palliative care and [facility] as well. He is uncertain if he would like this support at this time. He denies any spiritual concerns. I was able to speak with his spouse after exiting room and she confirms/supports the patient decisions. She was given information regarding palliative care and i as well. She did have some questions regarding rehab placement and I spoke with KEVIN Hatfield to  provide clarification to spouse. I spoke with CLAUDIO Bartholomew.       Thank you for this consult and allowing us to participate in patient's plan of care. Palliative Care Team will sign off and see PRN.       Time spent:45 minutes spent reviewing medical and medication records, assessing and examining patient, discussing with family, answering questions, providing some guidance about a plan and documentation of care, and coordinating care with other healthcare members, with > 50% time spent face to face.       Maria L Parr, MY  11/6/2023  11:54 EST

## 2023-11-06 NOTE — PROGRESS NOTES
Continued Stay Note  [facility]     Patient Name: Madhu Santoro  MRN: [id number]  Today's Date: 11/6/2023    Admit Date: 10/23/2023    Plan: [facility]   Discharge Plan       Row Name 11/06/23 1410       Plan    Plan [facility]    Patient/Family in Agreement with Plan yes    Plan Comments Waiting PEG placement.  Per Yasmin/[facility] should have a bed available.  Spoke with wife Brooke by telepone.  She is agreeable to [facility].  Packet in CCP office.  CCP continues to follow.  Perez SNYDER                    Expected Discharge Date and Time       Expected Discharge Date Expected Discharge Time    Nov 8, 2023               Becky S. [place]eni[place], RN

## 2023-11-06 NOTE — OP NOTE
Colorectal & General Surgery  Operative Report    Patient: Madhu Santoro  YOB: 1945  MRN: [id number]  DATE OF PROCEDURE: 11/06/23     PREOPERATIVE DIAGNOSIS:  Dysphagia    POSTOPERATIVE DIAGNOSIS:  Same    PROCEDURE:  Esophagogastroduodenoscopy with percutaneous endoscopic gastrostomy tube placement    FINDINGS:  Chronic gastritis. PEG tube at 3cm at skin.     SURGEON:  Vito Ramsey MD    ASSISTANT:  None    ANESTHESIA:  Monitored anesthesia care    EBL:  0 mL    SPECIMEN:  None    OPERATIVE DESCRIPTION:  The patient was brought to the operating room under the care of the nursing staff.  The patient was placed on the operating room table in the supine position where anesthesia was induced.  The patient was then prepped and positioned in the usual sterile fashion.  A standardized timeout was then performed.    The endoscope was inserted into the oropharynx and advanced into the esophagus, the stomach and eventually the first portion of the duodenum.  The scope was then brought back into the stomach and the stomach was maximally insufflated.  A point of light reflex and one-to-one palpation reflux was identified on the anterior abdominal wall and marked.  Local anesthetic was infiltrated at the site.  A 1 cm skin incision was made.  The finder needle was inserted under direct visualization.  The wire was then advanced into the stomach and grasped with the endoscopic snare.  The wire was then retracted through the oropharynx and the gastrostomy tube was inserted over the wire into the stomach.  The gastrostomy tube was placed at 3 centimeters from the skin.     All needle, sponge, and instrument counts were correct at the end of the case.    The patient tolerated the procedure well and was transferred to the postanesthesia care unit in stable condition.    Vito Ramsey M.D.  Colorectal & General Surgery  [facility]    [address], [address]  [place], [place], [address]  P:  [phone]  F: [phone]

## 2023-11-07 ENCOUNTER — SPECIALTY PHARMACY (OUTPATIENT)
Dept: PHARMACY | Facility: HOSPITAL | Age: 78
End: 2023-11-07
Payer: MEDICARE

## 2023-11-07 LAB
ALBUMIN SERPL-MCNC: 2.7 G/DL (ref 3.5–5.2)
ALBUMIN/GLOB SERPL: 1 G/DL
ALP SERPL-CCNC: 104 U/L (ref 39–117)
ALT SERPL W P-5'-P-CCNC: 44 U/L (ref 1–41)
ANION GAP SERPL CALCULATED.3IONS-SCNC: 5 MMOL/L (ref 5–15)
ANISOCYTOSIS BLD QL: ABNORMAL
AST SERPL-CCNC: 38 U/L (ref 1–40)
BACTERIA SPEC AEROBE CULT: NORMAL
BACTERIA SPEC AEROBE CULT: NORMAL
BILIRUB SERPL-MCNC: 1.4 MG/DL (ref 0–1.2)
BUN SERPL-MCNC: 30 MG/DL (ref 8–23)
BUN/CREAT SERPL: 43.5 (ref 7–25)
CALCIUM SPEC-SCNC: 8.3 MG/DL (ref 8.6–10.5)
CHLORIDE SERPL-SCNC: 107 MMOL/L (ref 98–107)
CO2 SERPL-SCNC: 26 MMOL/L (ref 22–29)
CREAT SERPL-MCNC: 0.69 MG/DL (ref 0.76–1.27)
DEPRECATED RDW RBC AUTO: 53.8 FL (ref 37–54)
EGFRCR SERPLBLD CKD-EPI 2021: 94.7 ML/MIN/1.73
ERYTHROCYTE [DISTWIDTH] IN BLOOD BY AUTOMATED COUNT: 13.3 % (ref 12.3–15.4)
GLOBULIN UR ELPH-MCNC: 2.7 GM/DL
GLUCOSE BLDC GLUCOMTR-MCNC: 116 MG/DL (ref 70–130)
GLUCOSE BLDC GLUCOMTR-MCNC: 153 MG/DL (ref 70–130)
GLUCOSE BLDC GLUCOMTR-MCNC: 168 MG/DL (ref 70–130)
GLUCOSE BLDC GLUCOMTR-MCNC: 99 MG/DL (ref 70–130)
GLUCOSE SERPL-MCNC: 126 MG/DL (ref 65–99)
HCT VFR BLD AUTO: 29 % (ref 37.5–51)
HGB BLD-MCNC: 9.8 G/DL (ref 13–17.7)
LYMPHOCYTES # BLD MANUAL: 0.62 10*3/MM3 (ref 0.7–3.1)
LYMPHOCYTES NFR BLD MANUAL: 15.2 % (ref 5–12)
MAGNESIUM SERPL-MCNC: 2.1 MG/DL (ref 1.6–2.4)
MCH RBC QN AUTO: 37.8 PG (ref 26.6–33)
MCHC RBC AUTO-ENTMCNC: 33.8 G/DL (ref 31.5–35.7)
MCV RBC AUTO: 112 FL (ref 79–97)
METAMYELOCYTES NFR BLD MANUAL: 1 % (ref 0–0)
MONOCYTES # BLD: 2.37 10*3/MM3 (ref 0.1–0.9)
MYELOCYTES NFR BLD MANUAL: 13.1 % (ref 0–0)
NEUTROPHILS # BLD AUTO: 10.39 10*3/MM3 (ref 1.7–7)
NEUTROPHILS NFR BLD MANUAL: 66.7 % (ref 42.7–76)
PHOSPHATE SERPL-MCNC: 2.6 MG/DL (ref 2.5–4.5)
PLAT MORPH BLD: NORMAL
PLATELET # BLD AUTO: 335 10*3/MM3 (ref 140–450)
PMV BLD AUTO: 11 FL (ref 6–12)
POTASSIUM SERPL-SCNC: 4.1 MMOL/L (ref 3.5–5.2)
PROT SERPL-MCNC: 5.4 G/DL (ref 6–8.5)
RBC # BLD AUTO: 2.59 10*6/MM3 (ref 4.14–5.8)
SODIUM SERPL-SCNC: 138 MMOL/L (ref 136–145)
VARIANT LYMPHS NFR BLD MANUAL: 4 % (ref 19.6–45.3)
WBC MORPH BLD: NORMAL
WBC NRBC COR # BLD: 15.58 10*3/MM3 (ref 3.4–10.8)

## 2023-11-07 PROCEDURE — 82948 REAGENT STRIP/BLOOD GLUCOSE: CPT

## 2023-11-07 PROCEDURE — 84100 ASSAY OF PHOSPHORUS: CPT | Performed by: HOSPITALIST

## 2023-11-07 PROCEDURE — 63710000001 INSULIN REGULAR HUMAN PER 5 UNITS: Performed by: HOSPITALIST

## 2023-11-07 PROCEDURE — 80053 COMPREHEN METABOLIC PANEL: CPT | Performed by: HOSPITALIST

## 2023-11-07 PROCEDURE — 99231 SBSQ HOSP IP/OBS SF/LOW 25: CPT | Performed by: SURGERY

## 2023-11-07 PROCEDURE — 94799 UNLISTED PULMONARY SVC/PX: CPT

## 2023-11-07 PROCEDURE — 83735 ASSAY OF MAGNESIUM: CPT | Performed by: INTERNAL MEDICINE

## 2023-11-07 PROCEDURE — 25010000002 METRONIDAZOLE 500 MG/100ML SOLUTION: Performed by: INTERNAL MEDICINE

## 2023-11-07 PROCEDURE — 85007 BL SMEAR W/DIFF WBC COUNT: CPT | Performed by: HOSPITALIST

## 2023-11-07 PROCEDURE — 25010000002 CEFEPIME PER 500 MG: Performed by: INTERNAL MEDICINE

## 2023-11-07 PROCEDURE — 85025 COMPLETE CBC W/AUTO DIFF WBC: CPT | Performed by: HOSPITALIST

## 2023-11-07 RX ADMIN — HYDROCODONE BITARTRATE AND ACETAMINOPHEN 1 TABLET: 5; 325 TABLET ORAL at 00:45

## 2023-11-07 RX ADMIN — METRONIDAZOLE 500 MG: 500 INJECTION, SOLUTION INTRAVENOUS at 00:44

## 2023-11-07 RX ADMIN — METRONIDAZOLE 500 MG: 500 INJECTION, SOLUTION INTRAVENOUS at 08:54

## 2023-11-07 RX ADMIN — DEXAMETHASONE 1 MG: 2 TABLET ORAL at 08:51

## 2023-11-07 RX ADMIN — CEFEPIME 2000 MG: 2 INJECTION, POWDER, FOR SOLUTION INTRAVENOUS at 15:08

## 2023-11-07 RX ADMIN — LANSOPRAZOLE 30 MG: 15 TABLET, ORALLY DISINTEGRATING ORAL at 08:49

## 2023-11-07 RX ADMIN — Medication 1 APPLICATION: at 08:51

## 2023-11-07 RX ADMIN — INSULIN HUMAN 2 UNITS: 100 INJECTION, SOLUTION PARENTERAL at 12:24

## 2023-11-07 RX ADMIN — SUCRALFATE 1 G: 1 TABLET ORAL at 08:49

## 2023-11-07 RX ADMIN — AMLODIPINE BESYLATE 5 MG: 5 TABLET ORAL at 08:50

## 2023-11-07 RX ADMIN — CEFEPIME 2000 MG: 2 INJECTION, POWDER, FOR SOLUTION INTRAVENOUS at 06:45

## 2023-11-07 RX ADMIN — METRONIDAZOLE 500 MG: 500 INJECTION, SOLUTION INTRAVENOUS at 21:57

## 2023-11-07 RX ADMIN — APIXABAN 5 MG: 5 TABLET, FILM COATED ORAL at 08:49

## 2023-11-07 RX ADMIN — INSULIN HUMAN 2 UNITS: 100 INJECTION, SOLUTION PARENTERAL at 17:25

## 2023-11-07 RX ADMIN — METRONIDAZOLE 500 MG: 500 INJECTION, SOLUTION INTRAVENOUS at 15:08

## 2023-11-07 RX ADMIN — FUROSEMIDE 20 MG: 20 TABLET ORAL at 08:49

## 2023-11-07 RX ADMIN — Medication 1 APPLICATION: at 14:15

## 2023-11-07 RX ADMIN — Medication 1 APPLICATION: at 17:36

## 2023-11-07 RX ADMIN — AMITRIPTYLINE HYDROCHLORIDE 50 MG: 50 TABLET, FILM COATED ORAL at 21:56

## 2023-11-07 RX ADMIN — Medication 1 APPLICATION: at 21:57

## 2023-11-07 RX ADMIN — APIXABAN 5 MG: 5 TABLET, FILM COATED ORAL at 20:47

## 2023-11-07 RX ADMIN — OXYBUTYNIN CHLORIDE 5 MG: 5 TABLET, EXTENDED RELEASE ORAL at 08:49

## 2023-11-07 RX ADMIN — HYDROCODONE BITARTRATE AND ACETAMINOPHEN 1 TABLET: 5; 325 TABLET ORAL at 20:47

## 2023-11-07 RX ADMIN — SUCRALFATE 1 G: 1 TABLET ORAL at 17:24

## 2023-11-07 RX ADMIN — HYDROCODONE BITARTRATE AND ACETAMINOPHEN 1 TABLET: 5; 325 TABLET ORAL at 15:24

## 2023-11-07 NOTE — SIGNIFICANT NOTE
11/07/23 1515   OTHER   Discipline occupational therapist   Rehab Time/Intention   Session Not Performed patient/family declined treatment  (Pt not feeling well today. OT will follow up tomorrow 11/8.)   Therapy Assessment/Plan (PT)   Criteria for Skilled Interventions Met (PT) skilled treatment is necessary   Recommendation   OT - Next Appointment 11/08/23

## 2023-11-07 NOTE — PLAN OF CARE
Goal Outcome Evaluation:                 A&Ox3. Tube feeds restarted per nursing communication at 2200 at 20cc/hr. Turned q2. DEWEY REEVES.

## 2023-11-07 NOTE — PROGRESS NOTES
Continued Stay Note  [facility]     Patient Name: Madhu Santoro  MRN: [id number]  Today's Date: 11/7/2023    Admit Date: 10/23/2023    Plan: [facility] via [facility]   Discharge Plan       Row Name 11/07/23 1413       Plan    Plan [facility] via [facility]    Patient/Family in Agreement with Plan yes    Plan Comments Anticipate DC tomorrow.  Spoke with wife Brooke by telephone.  She is aware he may DC.  Ambulance disclaimer given.  [facility] scheduled for 3:30 p.m. CCP continues to follow.  Perez SNYDER                 Expected Discharge Date and Time       Expected Discharge Date Expected Discharge Time    Nov 8, 2023               Becky S. [place], RN

## 2023-11-07 NOTE — SIGNIFICANT NOTE
11/07/23 1003   OTHER   Discipline physical therapist   Rehab Time/Intention   Session Not Performed patient/family declined treatment  (Pt not feeling well today. He did ask to be turned so PT turned pt from L sidelying to R sidelying. Rn and CNA notified. Will follow up as time allows.)

## 2023-11-07 NOTE — PROGRESS NOTES
"                                              LOS: 13 days   Patient Care Team:  Damian Sanchez MD as PCP - General (Family Medicine)  Jr Temo Cortez MD as Surgeon (Cardiothoracic Surgery)  Netta Mayorga Jr., MD as Consulting Physician (Vascular Surgery)  Damian Sanchez MD as Referring Physician (Family Medicine)  Aquiles Lopez MD as Consulting Physician (Hematology and Oncology)  Christy Luther APRN as Nurse Practitioner (Nurse Practitioner)  Damian Sanchez MD as Referring Physician (Family Medicine)    Chief Complaint:  F/up pneumonia, COVID infection    Subjective   Interval History    S/p PEG tube yesterday.  It seems like it was tolerated well.  On RA.  He denies cough or dyspnea.    No fever.    REVIEW OF SYSTEMS:       GASTROINTESTINAL: No anorexia, nausea, vomiting or diarrhea. No abdominal pain.  CONSTITUTIONAL: No fever or chills.     Ventilator/Non-Invasive Ventilation Settings (From admission, onward)      None                  Physical Exam:     Vital Signs  Temp:  [98.1 °F (36.7 °C)-98.6 °F (37 °C)] 98.6 °F (37 °C)  Heart Rate:  [81-96] 96  Resp:  [16-24] 22  BP: (122-147)/(57-85) 147/66    Intake/Output Summary (Last 24 hours) at 11/7/2023 1423  Last data filed at 11/7/2023 1300  Gross per 24 hour   Intake 400 ml   Output --   Net 400 ml     Flowsheet Rows      Flowsheet Row First Filed Value   Admission Height 172.7 cm (68\") Documented at 10/23/2023 0536   Admission Weight 78.8 kg (173 lb 12.8 oz) Documented at 10/23/2023 0536            PPE used per hospital policy    General Appearance:   Alert, cooperative, in no acute distress   ENMT:  Mallampati score 3, dry mucous membrane   Eyes:  Pupils equal and reactive to light. EOMI   Neck:   Trachea midline. No thyromegaly.   Lungs:    Clear to auscultation,respirations regular, even and nonlabored    Heart:   Regular rhythm and normal rate, normal S1 and S2, no         murmur   Skin:   No rash or ecchymosis   Abdomen:    Soft. " No tenderness. No HSM.   Neuro/psych:  Conscious, alert, oriented x3. Strength 4/5 in upper and lower  ext.  Appropriate mood and affect   Extremities:  No cyanosis, clubbing but mild edema in distal legs.  Warm extremities and well-perfused          Results Review:        Results from last 7 days   Lab Units 11/07/23  0808 11/06/23  0707 11/05/23  0734   SODIUM mmol/L 138 137 136   POTASSIUM mmol/L 4.1 4.0 4.0   CHLORIDE mmol/L 107 103 103   CO2 mmol/L 26.0 26.0 27.0   BUN mg/dL 30* 27* 35*   CREATININE mg/dL 0.69* 0.63* 0.70*   GLUCOSE mg/dL 126* 98 156*   CALCIUM mg/dL 8.3* 8.3* 8.3*     Results from last 7 days   Lab Units 11/02/23  1735 11/02/23  1118 11/02/23  0854   HSTROP T ng/L 12 19* 16*     Results from last 7 days   Lab Units 11/07/23  0808 11/06/23  0707 11/05/23  0734   WBC 10*3/mm3 15.58* 18.14* 17.97*   HEMOGLOBIN g/dL 9.8* 10.7* 10.8*   HEMATOCRIT % 29.0* 31.7* 32.1*   PLATELETS 10*3/mm3 335 292 265                                   I reviewed the patient's new clinical results.        Medication Review:   amitriptyline, 50 mg, Oral, Nightly  amLODIPine, 5 mg, Oral, Daily  apixaban, 5 mg, Oral, BID  cefepime, 2,000 mg, Intravenous, Q8H  furosemide, 20 mg, Oral, Daily  HYDROcodone-acetaminophen, 1 tablet, Oral, Nightly  insulin regular, 2-9 Units, Subcutaneous, Q6H  lansoprazole, 30 mg, Nasogastric, QAM AC  Menthol-Zinc Oxide, 1 application , Topical, 4x Daily  metroNIDAZOLE, 500 mg, Intravenous, Q8H  oxybutynin XL, 5 mg, Oral, Daily  sucralfate, 1 g, Oral, BID AC        sodium chloride, 1,000 mL, Last Rate: Stopped (11/06/23 1614)        Diagnostic imaging:  I personally and independently reviewed the following images:    CT chest 11/30/2023: LLL consolidation.  Mild nodular infiltrates in the RLL..  Otherwise exam limited due to motion artifact.      Assessment     COVID-19 pneumonia  Bilateral lower lobe consolidations, L>R, likely bacterial pneumonia  Dysphagia    A-fib  CAD s/p  CABG  HTN  Chronic pain      Plan       DC Decadron  Antibiotics with cefepime and Flagyl for aspiration pneumonia.  Complete 10 days course of therapy.  Anticoagulation with Eliquis  Tube feeding via PEG tube  Incentive spirometry  Repeat CXR or CT chest as outpatient in about 1 month to ensure resolution of the pneumonia.    Discussed with his daughter at bedside.  She    Ralph Bianchi MD  11/07/23  14:23 EST          This note was dictated utilizing [employer] dictation

## 2023-11-07 NOTE — PROGRESS NOTES
"Nutrition Services    Patient Name:  Madhu Santoro  YOB: 1945  MRN: [id number]  Admit Date:  10/23/2023    Assessment Date:  11/07/23    Summary: TF follow up  S/p PEG placement yesterday.  TFs of Diabetisource running at 50 mL/hr with 20 mL q hour free water flushes.  Goal rate is 65 mL/hr.  Meds: flagyl, lasix, dexamethasone, SSI, prevacid, carafate  Labs: Na 138, Gluc 116/126/153, BUN 30, Crea 0.69, ALT 44    Recommend:  Continue to advance TF's of Diabetisource AC to goal rate of 65 ml/hr with 20 ml/hr free water flushes.  Advance diet as tolerates per SLP recommendations.     Will follow closely for tolerance and ability to advance diet.    CLINICAL NUTRITION ASSESSMENT      Reason for Assessment Follow-up Protocol, Tube Feeding Assessment      Diagnosis/Problem   Fall at home, weakness, malaise, fatigue  Dx: COVID-19, HTN, PAD, chronic anticoagulation, Aflutter s/p ablation, s/p CABG, CAD. HTN, HLD, CVA, chronic pain, gout     Anthropometrics        Current Height  Current Weight  BMI kg/m2 Height: 172.7 cm (68\")  Weight: 65.4 kg (144 lb 2.9 oz) (11/07/23 0647)  Body mass index is 21.92 kg/m².   Adjusted BMI (if applicable)    BMI Category Normal/Healthy (18.4 - 24.9)   Ideal Body Weight (IBW) 150 lb   Usual Body Weight (UBW) 170-174 lb   Weight Trend Loss, Amount/Timeframe: 13 lb (7.5%) wt loss x 1 week   --  Estimated/Assessed Needs        Current Weight  Weight: 65.4 kg (144 lb 2.9 oz) (11/07/23 0647)       Energy Requirements    Weight for Calculation 73 kg   Method for Estimation  25 kcal/kg   EST Needs (kcal/day) 1825 kcals       Protein Requirements    Weight for Calculation 73 kg   EST Protein Needs (g/kg) 1.2 - 1.5 gm/kg   EST Daily Needs (g/day)  g       Fluid Requirements     Method for Estimation 30 mL/kg    EST Needs (mL/day) 2190 ml     Labs       Pertinent Labs    Results from last 7 days   Lab Units 11/07/23  0808 11/06/23  0707 11/05/23  0734   SODIUM mmol/L 138 137 " 136   POTASSIUM mmol/L 4.1 4.0 4.0   CHLORIDE mmol/L 107 103 103   CO2 mmol/L 26.0 26.0 27.0   BUN mg/dL 30* 27* 35*   CREATININE mg/dL 0.69* 0.63* 0.70*   CALCIUM mg/dL 8.3* 8.3* 8.3*   BILIRUBIN mg/dL 1.4* 1.5* 0.9   ALK PHOS U/L 104 104 123*   ALT (SGPT) U/L 44* 48* 53*   AST (SGOT) U/L 38 44* 39   GLUCOSE mg/dL 126* 98 156*     Results from last 7 days   Lab Units 11/07/23  0808 11/06/23  0707 11/05/23  0734   MAGNESIUM mg/dL 2.1 2.2 1.9   PHOSPHORUS mg/dL 2.6 2.7  --    HEMOGLOBIN g/dL 9.8* 10.7* 10.8*   HEMATOCRIT % 29.0* 31.7* 32.1*   WBC 10*3/mm3 15.58* 18.14* 17.97*   ALBUMIN g/dL 2.7* 2.5* 2.6*     Results from last 7 days   Lab Units 11/07/23  0808 11/06/23  0707 11/05/23  0734 11/04/23  0743 11/03/23  0738   PLATELETS 10*3/mm3 335 292 265 197 187     COVID19   Date Value Ref Range Status   10/23/2023 Detected (C) Not Detected - Ref. Range Final     Lab Results   Component Value Date    HGBA1C 5.50 11/04/2023          Medications           Scheduled Medications amitriptyline, 50 mg, Oral, Nightly  amLODIPine, 5 mg, Oral, Daily  apixaban, 5 mg, Oral, BID  cefepime, 2,000 mg, Intravenous, Q8H  furosemide, 20 mg, Oral, Daily  HYDROcodone-acetaminophen, 1 tablet, Oral, Nightly  insulin regular, 2-9 Units, Subcutaneous, Q6H  lansoprazole, 30 mg, Nasogastric, QAM AC  Menthol-Zinc Oxide, 1 application , Topical, 4x Daily  metroNIDAZOLE, 500 mg, Intravenous, Q8H  oxybutynin XL, 5 mg, Oral, Daily  sucralfate, 1 g, Oral, BID AC       Infusions sodium chloride, 1,000 mL, Last Rate: Stopped (11/06/23 6706)       PRN Medications   acetaminophen **OR** acetaminophen **OR** acetaminophen    benzocaine-menthol    senna-docusate sodium **AND** polyethylene glycol **AND** bisacodyl **AND** bisacodyl    Calcium Replacement - Follow Nurse / BPA Driven Protocol    dextrose    dextrose    glucagon (human recombinant)    guaifenesin    HYDROcodone-acetaminophen    HYDROcodone-acetaminophen    Magnesium Standard Dose Replacement  - Follow Nurse / BPA Driven Protocol    nitroglycerin    ondansetron **OR** ondansetron    phenol    Phosphorus Replacement - Follow Nurse / BPA Driven Protocol    Potassium Replacement - Follow Nurse / BPA Driven Protocol    [COMPLETED] Insert Peripheral IV **AND** sodium chloride    sodium chloride     Physical Findings          General Findings alert, disoriented, impaired speech, room air   Oral/Mouth Cavity dry mouth, tooth or teeth missing   Edema  generalized, lower extremity , 1+ (trace)   Gastrointestinal fecal incontinence, hypoactive bowel sounds, non-distended , last bowel movement: 11/1   Skin  bruising, pressure injury: coccyx unstageable   Tubes/Drains/Lines PEG   NFPE No clinical signs of muscle wasting or fat loss   --  Current Nutrition Orders & Evaluation of Intake       Oral Nutrition     Food Allergies NKFA   Current PO Diet NPO Diet NPO Type: Strict NPO   Supplement n/a   PO Evaluation     % PO Intake N/a    Factors Affecting Intake: swallow impairment, weakness   --   Enteral Nutrition     Enteral Route PEG    TF Delivery Method Continuous    Propofol Rate/Kcal     Current TF Order/Rate  Diabetisource AC @ 50 mL/hr    TF Goal Rate 65 mL/hr    Current Water Flush 20 mL Q 1 hr    Modular None    TF Residual  no or minimal residual    TF Tolerance tolerating    TF Observation Verified correct TF and water flush infusing per orders     PES STATEMENT / NUTRITION DIAGNOSIS      Nutrition Dx Problem  Problem: Inadequate Protein Energy Intake  Etiology: Factors Affecting Nutrition - swallow impairment    Signs/Symptoms: NPO and SLP/Swallow Evaluation     NUTRITION INTERVENTION / PLAN OF CARE      Intervention Goal(s) Maintain nutrition status, Reduce/improve symptoms, Meet estimated needs, Disease management/therapy, Initiate TF/PN, Tolerate TF/PN at goal, Transition TF to PO, and Maintain weight         RD Intervention/Action Continue to monitor and Care plan reviewed   --      Prescription/Orders:        PO Diet       Supplements       Enteral Nutrition    Enteral Prescription:     Enteral Route PEG    TF Delivery Method Continuous    Enteral Product Diabetisource AC    Modular None    Propofol Rate/Kcal     TF Start Rate  65 mL/hr    TF Goal Rate  65 mL/hr    Free Water Flush 20 mL Q 1 hr    Provision at Goal:          Calories 1872 kcal, meets 100 % needs         Protein  93 gm protein, meets 100 % needs         Fluid (mL) 1272 mL free water + 480 mL in flushes    Prescription Ordered Continue same per protocol         Parenteral Nutrition    New Prescription Ordered? Continue same per protocol   --      Monitor/Evaluation Per protocol   Discharge Plan/Needs Pending clinical course   --    RD to follow per protocol.      Electronically signed by:  Marilee Calderon RD  11/07/23 13:58 EST

## 2023-11-07 NOTE — PROGRESS NOTES
[facility]    [phone]   LOS: 13 days     Name: Madhu Santoro  Age/Sex: 77 y.o. male  :  1945        PCP: Damian Sanchez MD  Chief Complaint   Patient presents with    Fall      Subjective   Per surgery documentation procedure was tolerated well with no signs of bleeding no signs of complications.  They have signed off at this point.  His family is concerned that something went wrong during the procedure because he does not feel as well today.  She is quite concerned about the progress of his pneumonia and quite concerned about how he looks today.  Currently tolerating tube feeds and advancing to goal every 6 hours.  General: No Fever or Chills, Cardiac: No Chest Pain or Palpitations, Resp: No Cough or SOA, GI: No Nausea, Vomiting, or Diarrhea, and Other: No bleeding    amitriptyline, 50 mg, Oral, Nightly  amLODIPine, 5 mg, Oral, Daily  apixaban, 5 mg, Oral, BID  cefepime, 2,000 mg, Intravenous, Q8H  furosemide, 20 mg, Oral, Daily  HYDROcodone-acetaminophen, 1 tablet, Oral, Nightly  insulin regular, 2-9 Units, Subcutaneous, Q6H  lansoprazole, 30 mg, Nasogastric, QAM AC  Menthol-Zinc Oxide, 1 application , Topical, 4x Daily  metroNIDAZOLE, 500 mg, Intravenous, Q8H  oxybutynin XL, 5 mg, Oral, Daily  sucralfate, 1 g, Oral, BID AC      sodium chloride, 1,000 mL, Last Rate: Stopped (23 1614)        Objective   Vital Signs  Temp:  [98.1 °F (36.7 °C)-98.6 °F (37 °C)] 98.1 °F (36.7 °C)  Heart Rate:  [81-92] 85  Resp:  [16-24] 22  BP: (115-146)/(52-85) 126/57  Body mass index is 21.92 kg/m².    Intake/Output Summary (Last 24 hours) at 2023 1228  Last data filed at 2023 0900  Gross per 24 hour   Intake 400 ml   Output --   Net 400 ml       Physical Exam  Vitals and nursing note reviewed.   Constitutional:       General: He is not in acute distress.     Appearance: Normal appearance. He is ill-appearing.   Cardiovascular:      Rate and Rhythm: Regular rhythm. Tachycardia  present.   Pulmonary:      Effort: Pulmonary effort is normal. No respiratory distress.      Breath sounds: Normal breath sounds.      Comments: Verry poor cough reflex  Abdominal:      General: Bowel sounds are normal. There is no distension.      Palpations: Abdomen is soft.      Comments: Peg site clean and dry with out erythema or bleeding   Skin:     General: Skin is warm and dry.   Neurological:      Mental Status: He is alert.           Results Review:       I reviewed the patient's new clinical results.  Results from last 7 days   Lab Units 11/07/23  0808 11/06/23  0707 11/05/23  0734 11/04/23  0743 11/03/23  0738 11/02/23  0854 11/01/23  1211   WBC 10*3/mm3 15.58* 18.14* 17.97* 15.04* 16.63* 17.16* 17.36*   HEMOGLOBIN g/dL 9.8* 10.7* 10.8* 10.8* 11.1* 11.9* 11.9*   PLATELETS 10*3/mm3 335 292 265 197 187 159 143     Results from last 7 days   Lab Units 11/07/23  0808 11/06/23  0707 11/05/23  0734 11/04/23  2231 11/04/23  0743 11/03/23  1336 11/02/23  0854 11/01/23  1211   SODIUM mmol/L 138 137 136  --  136 134* 134* 137   POTASSIUM mmol/L 4.1 4.0 4.0  --  4.3 4.6 4.3 4.1   CHLORIDE mmol/L 107 103 103  --  103 100 100 102   CO2 mmol/L 26.0 26.0 27.0  --  25.0 23.8 24.0 26.9   BUN mg/dL 30* 27* 35*  --  39* 42* 31* 31*   CREATININE mg/dL 0.69* 0.63* 0.70*  --  0.68* 0.83 0.73* 0.83   CALCIUM mg/dL 8.3* 8.3* 8.3*  --  8.1* 8.1* 8.5* 8.6   MAGNESIUM mg/dL 2.1 2.2 1.9  --  2.1  --  2.1  --    PHOSPHORUS mg/dL 2.6 2.7  --  2.8 2.2*  --   --   --    Estimated Creatinine Clearance: 81.6 mL/min (A) (by C-G formula based on SCr of 0.69 mg/dL (L)).      Assessment & Plan   Active Hospital Problems    Diagnosis  POA    **COVID-19 [U07.1]  Yes    Polycythemia [D75.1]  Yes    Chronic anticoagulation [Z79.01]  Not Applicable    PAD (peripheral artery disease) [I73.9]  Yes    Stenosis of carotid artery [I65.29]  Yes    S/P ablation of atrial flutter [Z98.890, Z86.79]  Not Applicable    S/P CABG (coronary artery bypass  graft) [Z95.1]  Not Applicable    Coronary artery disease due to lipid rich plaque [I25.10, I25.83]  Yes    HLD (hyperlipidemia) [E78.5]  Yes    Benign essential hypertension [I10]  Yes    Cerebrovascular accident [I63.9]  Yes      Resolved Hospital Problems   No resolved problems to display.       PLAN  This is a 77 y.o. gentleman former smoker with a history of HTN, a flutter, CVA, HLD, CAD w/ h/o CABG, polycythemia vera, gout, chronic pain/opioid dependence that presents to [facility] complaining of generalized weakness and mechanical fall and was admitted with symptomatic COVID-19 infection  -He has completed steroids and treatment for his COVID-19 infection.  Still dealing with the sequela of this infection with ongoing dysphagia aspiration pneumonia and other medical comorbidities.  -Remains on antibiotics for aspiration pneumonia.  Today is day 5 of cefepime and day 5 of Flagyl.  Family is very concerned that he has continued pneumonia and is worried about stopping antibiotic therapy.  Will discuss with pulmonary.  We could repeat a chest x-ray to see if there is improvement in the infiltrates but I do not think prolonging antibiotic therapy at this point is likely to be of any clinical benefit.  I suspect that even if his imaging was worse this is just progression of the underlying infiltrates and pneumonia.  -Tolerated PEG tube placement and initiation of tube feedings.  Continue to titrate to goal.  -Eliquis resumed covers for DVT prophylaxis  -likely resume statin at DC  -Overall prognosis is guarded at best, with prolonged hospital stay, oropharyngeal dysphagia and multiple acute and chronic issues  -Plan SNF Thurs/Friday    Disposition  Expected Discharge Date: 11/8/2023; Expected Discharge Time:        Hayden Doll MD  [facility]  11/07/23  12:28 EST

## 2023-11-07 NOTE — CASE MANAGEMENT/SOCIAL WORK
"Physicians Statement of Medical Necessity for  Ambulance Transportation    GENERAL INFORMATION     Name: Madhu Santoro  YOB: 1945  Medicare #: ATTACH FACESHEET _______  Transport Date: 11/8/23 @ 1530  (Valid for round trips this date, or for scheduled repetitive trips for 60 days from the date signed below.)  Origin: [facility] 6S 623   Destination: [place] [facility]   Is the Patient's stay covered under Medicare Part A (PPS/DRG?)Yes  Closest appropriate facility? Yes  If this a hosp-hosp transfer? No  Is this a hospice patient? No    MEDICAL NECESSITY QUESTIONAIRE    Ambulance Transportation is medically necessary only if other means of transportation are contraindicated or would be potentially harmful to the patient.  To meet this requirement, the patient must be either \"bed confined\" or suffer from a condition such that transport by means other than an ambulance is contraindicated by the patient's condition.  The following questions must be answered by the healthcare professional signing below for this form to be valid:     1) Describe the MEDICAL CONDITION (physical and/or mental) of this patient AT THE TIME OF AMBULANCE TRANSPORT that requires the patient to be transported in an ambulance, and why transport by other means is contraindicated by the patient's condition:   Past Medical History:   Diagnosis Date    Anxiety     Arthritis     Atrial flutter     Status post cavotricuspid isthmus ablation by Dr. Gustafson on 4/18/17    Mandel esophagus     Benign essential hypertension     CAD (coronary artery disease)     3 vessel CABG 4/11/17 by Dr. Cortez: ROSARIO-prox LAD, SVG-OM1, SVG-OM3    Carotid artery disease     Status post carotid endarterectomy - [facility] 4/10/17: 50-59% NIHCELLE, 1-15% LICA.     Colonic polyp     Cyst of pancreas     DDD (degenerative disc disease), lumbosacral     GERD (gastroesophageal reflux disease)     H/O bone density study 2013    H/O complete eye exam 2014    History of kidney stone " "    HLD (hyperlipidemia)     Hypertension     Kidney stone     8/22/22    Lipid screening 05/31/2013    Low back pain     physical therapy [facility] 5-12-10    Screening for prostate cancer 07/07/2015    Skin cancer     nose    Stroke     RESIDUAL--\"BALANCE ISSUES\"      Past Surgical History:   Procedure Laterality Date    CARDIAC CATHETERIZATION N/A 04/10/2017    Procedure: Left Heart Cath;  Surgeon: Marjorie Healy MD;  Location: [facility] CATH INVASIVE LOCATION;  Service:     CARDIAC CATHETERIZATION N/A 04/10/2017    Procedure: Coronary angiography;  Surgeon: Marjorie Healy MD;  Location: [facility] CATH INVASIVE LOCATION;  Service:     CARDIAC CATHETERIZATION N/A 04/10/2017    Procedure: Left ventriculography;  Surgeon: Marjorie Healy MD;  Location: [facility] CATH INVASIVE LOCATION;  Service:     CARDIAC CATHETERIZATION  2011    CARDIAC ELECTROPHYSIOLOGY PROCEDURE N/A 04/18/2017    Procedure: Ablation atrial flutter;  Surgeon: Jose Antonio Gustafson MD;  Location: [facility] CATH INVASIVE LOCATION;  Service:     CAROTID ENDARTERECTOMY      CHOLECYSTECTOMY      CHOLECYSTECTOMY WITH INTRAOPERATIVE CHOLANGIOGRAM N/A 09/07/2019    Procedure: Laparoscopic cholecystectomy with intraoperative cholangiogram;  Surgeon: Gauri Travis MD;  Location: [facility] MAIN OR;  Service: General    COLONOSCOPY  01/06/2015    Diverticulosis, one TA    COLONOSCOPY N/A 02/14/2019    tics, NBIH, adenomatous polyp x 2    COLONOSCOPY N/A 11/05/2021    Procedure: COLONOSCOPY TO CECUM AND TERM. ILEUM WITH COLD POLYPECTOMIES;  Surgeon: Everton Abel MD;  Location: [facility] ENDOSCOPY;  Service: Gastroenterology;  Laterality: N/A;  PRE OP - PERS H/O POLYPS  POST OP - COLON POLYPS,, DIVERTICULOSIS, HEMORRHOIDS    CORONARY ARTERY BYPASS GRAFT N/A 04/11/2017    Procedure: AR STERNOTOMY CORONARY ARTERY BYPASS GRAFT TIMES 3 USING LEFT INTERNAL MAMMARY ARTERY AND LEFT GREATER SAPHENOUS VEIN GRAFT PER ENDOSCOPIC VEIN HARVESTING AND PRP ;  Surgeon: Temo PRINCE" "MD Diego;  Location: [facility] MAIN OR;  Service:     ENDOSCOPY  01/06/2015    HH, Ervin's esophagus    ENDOSCOPY N/A 02/14/2019    Z line irregular, HH, Ervin's esophagus    ENDOSCOPY N/A 11/05/2021    Procedure: ESOPHAGOGASTRODUODENOSCOPY WITH BIOPSIES;  Surgeon: Everton Abel MD;  Location: [facility] ENDOSCOPY;  Service: Gastroenterology;  Laterality: N/A;  PRE OP - PERS H/O ERVIN'S  POST OP - IRREG Z LINE    ENDOSCOPY W/ PEG TUBE PLACEMENT N/A 11/6/2023    Procedure: ESOPHAGOGASTRODUODENOSCOPY WITH PERCUTANEOUS ENDOSCOPIC GASTROSTOMY TUBE INSERTION;  Surgeon: Temo Ramsey MD;  Location: [facility] ENDOSCOPY;  Service: General;  Laterality: N/A;  Pre: dysphagia  Post: same    KNEE SURGERY Left     URETEROSCOPY LASER LITHOTRIPSY WITH STENT INSERTION Left 8/23/2022    Procedure: Cysto retrograde with left uretro stent placement;  Surgeon: Damien Oliveira MD;  Location: [facility] MAIN OR;  Service: Urology;  Laterality: Left;    VASECTOMY        2) Is this patient \"bed confined\" as defined below?Yes   To be \"bed confined\" the patient must satisfy all three of the following criteria:  (1) unable to get up from bed without assistance; AND (2) unable to ambulate;  AND (3) unable to sit in a chair or wheelchair.  3) Can this patient safely be transported by car or wheelchair van (I.e., may safely sit during transport, without an attendant or monitoring?)No   4. In addition to completing questions 1-3 above, please check any of the following conditions that apply*:          *Note: supporting documentation for any boxes checked must be maintained in the patient's medical records Moderate/severe pain on movement, Medical attendant required, and Unable to sit in a chair or wheelchair due to decubitus ulcers or other wounds      SIGNATURE OF PHYSICIAN OR OTHER AUTHORIZED HEALTHCARE PROFESSIONAL    I certify that the above information is true and correct based on my evaluation of this patient, and represent " that the patient requires transport by ambulance and that other forms of transport are contraindicated.  I understand that this information will be used by the Centers for Medicare and Medicaid Services (CMS) to support the determiniation of medical necessity for ambulance services, and I represent that I have personal knowledge of the patient's condition at the time of transport.       If this box is checked, I also certify that the patient is physically or mentally incapable of signing the ambulance service's claim form and that the institution with which I am affiliated has furnished care, services or assistance to the patient.  My signature below is made on behalf of the patient pursuant to 42 .36(b)(4). In accordance with 42 .37, the specific reason(s) that the patient is physically or mentally incapable of signing the claim for is as follow    Signature of Physician or Healthcare Professional  Date/Time:        (For Scheduled repetitive transport, this form is not valid for transports performed more than 60 days after this date).                                                                                                                                            --------------------------------------------------------------------------------------------  Printed Name and Credentials of Physician or Authorized Healthcare Professional     *Form must be signed by patient's attending physician for scheduled, repetitive transports,.  For non-repetitive ambulance transports, if unable to obtain the signature of the attending physician, any of the following may sign (please select below):     Physician  Clinical Nurse Specialist  Registered Nurse     Physician Assistant  Discharge Planner  Licensed Practical Nurse     Nurse Practitioner

## 2023-11-07 NOTE — PROGRESS NOTES
Colorectal & General Surgery  Progress Note    Patient: Madhu Santoro  YOB: 1945  MRN: [id number]      Assessment  Madhu Santoro is a 77 y.o. male with significant dysphagia and COVID infection now postprocedure day 1 from EGD with PEG.  Tolerated procedure well.  No signs of bleeding.  Tolerating tube feeds at 20 cc/h this morning.    Plan  Okay to advance tube feeds to goal by 20 cc/h every 6 hours  Okay to use tube for meds anytime  I will sign off.  Please call anytime with questions or concerns.      Subjective  No acute events overnight.  Tolerating tube feeds.    Objective    Vitals:    11/07/23 0051   BP: 137/60   Pulse: 81   Resp: 23   Temp: 98.6 °F (37 °C)   SpO2: 96%       Physical Exam  Constitutional: Well-developed well-nourished, no acute distress  Neck: Supple, trachea midline  Respiratory: No increased work of breathing, Symmetric excursion  Cardiovascular: Well pefursed, no jugular venous distention evident   Abdominal: PEG at 4 cm.  Rotates freely.  No bleeding.  Soft, non-tender, non-distended  Skin: Warm, dry, no rash on visualized skin surfaces  Psychiatric: Alert and oriented ×3, normal affect     Laboratory Results  None to review    Radiology  None to review         Vito Ramsey MD  Colorectal & General Surgery  [facility]    [address], [address]  [place], [place], [address]  P: [phone]  F: [phone]

## 2023-11-08 ENCOUNTER — APPOINTMENT (OUTPATIENT)
Dept: GENERAL RADIOLOGY | Facility: HOSPITAL | Age: 78
End: 2023-11-08
Payer: MEDICARE

## 2023-11-08 LAB
ALBUMIN SERPL-MCNC: 2.3 G/DL (ref 3.5–5.2)
ALBUMIN/GLOB SERPL: 0.8 G/DL
ALP SERPL-CCNC: 100 U/L (ref 39–117)
ALT SERPL W P-5'-P-CCNC: 28 U/L (ref 1–41)
ANION GAP SERPL CALCULATED.3IONS-SCNC: 5.2 MMOL/L (ref 5–15)
AST SERPL-CCNC: 26 U/L (ref 1–40)
BILIRUB SERPL-MCNC: 0.8 MG/DL (ref 0–1.2)
BUN SERPL-MCNC: 30 MG/DL (ref 8–23)
BUN/CREAT SERPL: 46.9 (ref 7–25)
CALCIUM SPEC-SCNC: 7.9 MG/DL (ref 8.6–10.5)
CHLORIDE SERPL-SCNC: 104 MMOL/L (ref 98–107)
CO2 SERPL-SCNC: 26.8 MMOL/L (ref 22–29)
CREAT SERPL-MCNC: 0.64 MG/DL (ref 0.76–1.27)
DEPRECATED RDW RBC AUTO: 54.9 FL (ref 37–54)
EGFRCR SERPLBLD CKD-EPI 2021: 96.9 ML/MIN/1.73
ERYTHROCYTE [DISTWIDTH] IN BLOOD BY AUTOMATED COUNT: 13.5 % (ref 12.3–15.4)
GLOBULIN UR ELPH-MCNC: 2.8 GM/DL
GLUCOSE BLDC GLUCOMTR-MCNC: 109 MG/DL (ref 70–130)
GLUCOSE BLDC GLUCOMTR-MCNC: 139 MG/DL (ref 70–130)
GLUCOSE BLDC GLUCOMTR-MCNC: 148 MG/DL (ref 70–130)
GLUCOSE BLDC GLUCOMTR-MCNC: 162 MG/DL (ref 70–130)
GLUCOSE SERPL-MCNC: 146 MG/DL (ref 65–99)
HCT VFR BLD AUTO: 26.2 % (ref 37.5–51)
HGB BLD-MCNC: 9 G/DL (ref 13–17.7)
MAGNESIUM SERPL-MCNC: 2 MG/DL (ref 1.6–2.4)
MCH RBC QN AUTO: 39.3 PG (ref 26.6–33)
MCHC RBC AUTO-ENTMCNC: 34.4 G/DL (ref 31.5–35.7)
MCV RBC AUTO: 114.4 FL (ref 79–97)
NT-PROBNP SERPL-MCNC: 201 PG/ML (ref 0–1800)
PHOSPHATE SERPL-MCNC: 1.9 MG/DL (ref 2.5–4.5)
PLATELET # BLD AUTO: 339 10*3/MM3 (ref 140–450)
PMV BLD AUTO: 10.8 FL (ref 6–12)
POTASSIUM SERPL-SCNC: 3.9 MMOL/L (ref 3.5–5.2)
PROT SERPL-MCNC: 5.1 G/DL (ref 6–8.5)
RBC # BLD AUTO: 2.29 10*6/MM3 (ref 4.14–5.8)
SODIUM SERPL-SCNC: 136 MMOL/L (ref 136–145)
WBC NRBC COR # BLD: 14.48 10*3/MM3 (ref 3.4–10.8)

## 2023-11-08 PROCEDURE — 63710000001 INSULIN REGULAR HUMAN PER 5 UNITS: Performed by: HOSPITALIST

## 2023-11-08 PROCEDURE — 25810000003 SODIUM CHLORIDE 0.9 % SOLUTION: Performed by: HOSPITALIST

## 2023-11-08 PROCEDURE — 25010000002 METRONIDAZOLE 500 MG/100ML SOLUTION: Performed by: INTERNAL MEDICINE

## 2023-11-08 PROCEDURE — 82948 REAGENT STRIP/BLOOD GLUCOSE: CPT

## 2023-11-08 PROCEDURE — 84100 ASSAY OF PHOSPHORUS: CPT | Performed by: INTERNAL MEDICINE

## 2023-11-08 PROCEDURE — 80053 COMPREHEN METABOLIC PANEL: CPT | Performed by: HOSPITALIST

## 2023-11-08 PROCEDURE — 71045 X-RAY EXAM CHEST 1 VIEW: CPT

## 2023-11-08 PROCEDURE — 94799 UNLISTED PULMONARY SVC/PX: CPT

## 2023-11-08 PROCEDURE — 83880 ASSAY OF NATRIURETIC PEPTIDE: CPT | Performed by: HOSPITALIST

## 2023-11-08 PROCEDURE — 85027 COMPLETE CBC AUTOMATED: CPT | Performed by: HOSPITALIST

## 2023-11-08 PROCEDURE — 84100 ASSAY OF PHOSPHORUS: CPT | Performed by: HOSPITALIST

## 2023-11-08 PROCEDURE — 83735 ASSAY OF MAGNESIUM: CPT | Performed by: INTERNAL MEDICINE

## 2023-11-08 RX ORDER — FENTANYL/ROPIVACAINE/NS/PF 2-625MCG/1
15 PLASTIC BAG, INJECTION (ML) EPIDURAL ONCE
Status: COMPLETED | OUTPATIENT
Start: 2023-11-08 | End: 2023-11-08

## 2023-11-08 RX ORDER — IPRATROPIUM BROMIDE AND ALBUTEROL SULFATE 2.5; .5 MG/3ML; MG/3ML
3 SOLUTION RESPIRATORY (INHALATION) EVERY 4 HOURS PRN
Status: DISCONTINUED | OUTPATIENT
Start: 2023-11-08 | End: 2023-11-09 | Stop reason: HOSPADM

## 2023-11-08 RX ADMIN — INSULIN HUMAN 2 UNITS: 100 INJECTION, SOLUTION PARENTERAL at 06:43

## 2023-11-08 RX ADMIN — Medication 1 APPLICATION: at 17:30

## 2023-11-08 RX ADMIN — Medication 1 APPLICATION: at 12:00

## 2023-11-08 RX ADMIN — HYDROCODONE BITARTRATE AND ACETAMINOPHEN 1 TABLET: 5; 325 TABLET ORAL at 20:29

## 2023-11-08 RX ADMIN — AMLODIPINE BESYLATE 5 MG: 5 TABLET ORAL at 09:49

## 2023-11-08 RX ADMIN — METRONIDAZOLE 500 MG: 500 INJECTION, SOLUTION INTRAVENOUS at 15:32

## 2023-11-08 RX ADMIN — Medication 1 APPLICATION: at 09:50

## 2023-11-08 RX ADMIN — AMITRIPTYLINE HYDROCHLORIDE 50 MG: 50 TABLET, FILM COATED ORAL at 20:29

## 2023-11-08 RX ADMIN — POTASSIUM PHOSPHATE, MONOBASIC POTASSIUM PHOSPHATE, DIBASIC 15 MMOL: 224; 236 INJECTION, SOLUTION, CONCENTRATE INTRAVENOUS at 15:32

## 2023-11-08 RX ADMIN — METRONIDAZOLE 500 MG: 500 INJECTION, SOLUTION INTRAVENOUS at 08:01

## 2023-11-08 RX ADMIN — OXYBUTYNIN CHLORIDE 5 MG: 5 TABLET, EXTENDED RELEASE ORAL at 09:49

## 2023-11-08 RX ADMIN — Medication 1 APPLICATION: at 20:30

## 2023-11-08 RX ADMIN — FUROSEMIDE 20 MG: 20 TABLET ORAL at 09:49

## 2023-11-08 RX ADMIN — HYDROCODONE BITARTRATE AND ACETAMINOPHEN 1 TABLET: 5; 325 TABLET ORAL at 06:52

## 2023-11-08 RX ADMIN — APIXABAN 5 MG: 5 TABLET, FILM COATED ORAL at 09:49

## 2023-11-08 RX ADMIN — SUCRALFATE 1 G: 1 TABLET ORAL at 06:43

## 2023-11-08 RX ADMIN — METRONIDAZOLE 500 MG: 500 INJECTION, SOLUTION INTRAVENOUS at 21:51

## 2023-11-08 RX ADMIN — LANSOPRAZOLE 30 MG: 15 TABLET, ORALLY DISINTEGRATING ORAL at 06:43

## 2023-11-08 RX ADMIN — SUCRALFATE 1 G: 1 TABLET ORAL at 17:30

## 2023-11-08 RX ADMIN — APIXABAN 5 MG: 5 TABLET, FILM COATED ORAL at 20:29

## 2023-11-08 NOTE — PROGRESS NOTES
"                                              LOS: 14 days   Patient Care Team:  Damian Sanchez MD as PCP - General (Family Medicine)  Jr Temo Cortez MD as Surgeon (Cardiothoracic Surgery)  Netta Mayorga Jr., MD as Consulting Physician (Vascular Surgery)  Damian Sanchez MD as Referring Physician (Family Medicine)  Aquiles Lopez MD as Consulting Physician (Hematology and Oncology)  Christy Luther APRN as Nurse Practitioner (Nurse Practitioner)  Damian Sanchez MD as Referring Physician (Family Medicine)    Chief Complaint:  F/up pneumonia, COVID infection    Subjective   Interval History    Tolerated tube feeding.  No shortness of breath or cough.  On RA.    REVIEW OF SYSTEMS:       GASTROINTESTINAL: No anorexia, nausea, vomiting or diarrhea. No abdominal pain.  CONSTITUTIONAL: No fever or chills.     Ventilator/Non-Invasive Ventilation Settings (From admission, onward)      None                  Physical Exam:     Vital Signs  Temp:  [97.7 °F (36.5 °C)-98.6 °F (37 °C)] 98.6 °F (37 °C)  Heart Rate:  [77-96] 78  Resp:  [18-22] 18  BP: (130-154)/(58-66) 130/61    Intake/Output Summary (Last 24 hours) at 11/8/2023 1102  Last data filed at 11/8/2023 0136  Gross per 24 hour   Intake 1607 ml   Output --   Net 1607 ml     Flowsheet Rows      Flowsheet Row First Filed Value   Admission Height 172.7 cm (68\") Documented at 10/23/2023 0536   Admission Weight 78.8 kg (173 lb 12.8 oz) Documented at 10/23/2023 0536            PPE used per hospital policy    General Appearance:   Alert, cooperative, in no acute distress   ENMT:  Mallampati score 3, dry mucous membrane   Eyes:  Pupils equal and reactive to light. EOMI   Neck:   Trachea midline. No thyromegaly.   Lungs:    Clear to auscultation,respirations regular, even and nonlabored    Heart:   Regular rhythm and normal rate, normal S1 and S2, no         murmur   Skin:   No rash or ecchymosis   Abdomen:    Soft. No tenderness. No HSM.   Neuro/psych:  " Conscious, alert, oriented x3. Strength 4/5 in upper and lower  ext.  Appropriate mood and affect   Extremities:  No cyanosis, clubbing but mild edema in distal legs.  Warm extremities and well-perfused          Results Review:        Results from last 7 days   Lab Units 11/08/23  0945 11/07/23  0808 11/06/23  0707   SODIUM mmol/L 136 138 137   POTASSIUM mmol/L 3.9 4.1 4.0   CHLORIDE mmol/L 104 107 103   CO2 mmol/L 26.8 26.0 26.0   BUN mg/dL 30* 30* 27*   CREATININE mg/dL 0.64* 0.69* 0.63*   GLUCOSE mg/dL 146* 126* 98   CALCIUM mg/dL 7.9* 8.3* 8.3*     Results from last 7 days   Lab Units 11/02/23  1735 11/02/23  1118 11/02/23  0854   HSTROP T ng/L 12 19* 16*     Results from last 7 days   Lab Units 11/08/23  0945 11/07/23  0808 11/06/23  0707   WBC 10*3/mm3 14.48* 15.58* 18.14*   HEMOGLOBIN g/dL 9.0* 9.8* 10.7*   HEMATOCRIT % 26.2* 29.0* 31.7*   PLATELETS 10*3/mm3 339 335 292                                   I reviewed the patient's new clinical results.        Medication Review:   amitriptyline, 50 mg, Oral, Nightly  amLODIPine, 5 mg, Oral, Daily  apixaban, 5 mg, Oral, BID  furosemide, 20 mg, Oral, Daily  HYDROcodone-acetaminophen, 1 tablet, Oral, Nightly  insulin regular, 2-9 Units, Subcutaneous, Q6H  lansoprazole, 30 mg, Nasogastric, QAM AC  Menthol-Zinc Oxide, 1 application , Topical, 4x Daily  metroNIDAZOLE, 500 mg, Intravenous, Q8H  oxybutynin XL, 5 mg, Oral, Daily  sucralfate, 1 g, Oral, BID AC               Diagnostic imaging:  I personally and independently reviewed the following images:    CT chest 11/30/2023: LLL consolidation.  Mild nodular infiltrates in the RLL..  Otherwise exam limited due to motion artifact.      Assessment     COVID-19 pneumonia  Bilateral lower lobe consolidations, L>R, likely bacterial pneumonia  Dysphagia    A-fib  CAD s/p CABG  HTN  Chronic pain      Plan         Antibiotics: Finished cefepime.  Still has 2 doses of Flagyl which can be switched to oral.  Anticoagulation with  Eliquis  Tube feeding via PEG tube  Incentive spirometry  PPI  Repeat CXR or CT chest as outpatient in about 1 month to ensure resolution of the pneumonia.    Dispo: SNU tomorrow.  CT chest and follow-up apt ordered    Ralph Bianchi MD  11/08/23  11:02 EST          This note was dictated utilizing Dragon dictation

## 2023-11-08 NOTE — PROGRESS NOTES
Continued Stay Note  [facility]     Patient Name: Madhu Santoro  MRN: [id number]  Today's Date: 11/8/2023    Admit Date: 10/23/2023    Plan: [facility] SNF via [facility] on Thursday 11/9 @ 1530 PENDING DC ORDERS   Discharge Plan       Row Name 11/08/23 1325       Plan    Plan [facility] SNF via [facility] on Thursday 11/9 @ 1530 PENDING DC ORDERS    Plan Comments Potential dc tomorrow.  [facility] notified. [facility] rescheduled transport to [facility] on Thursday, 11/9 @ 1530. Yasmin with [facility] notified of potential dc tomorrow.  [facility] ordered TF formula; [facility] requested CCP send patient with enough TF to last until Friday in case delivery is delayed. TF is in CCP office with transfer packet. [facility]'s pharmacy is selected in [place].                   Discharge Codes    No documentation.                 Expected Discharge Date and Time       Expected Discharge Date Expected Discharge Time    Nov 8, 2023               Haydee Valencia RN

## 2023-11-08 NOTE — PROGRESS NOTES
[facility]    [phone]   LOS: 14 days     Name: Madhu Santoro  Age/Sex: 77 y.o. male  :  1945        PCP: Damian Sanchez MD  Chief Complaint   Patient presents with    Fall      Subjective   Tolerating tube feeds with no nausea vomiting aspiration or abdominal discomfort.  He is complaining of worsening shortness of breath.  He says he could not sleep at all last night due to increased trouble breathing.  Today he continues to say that he is has trouble breathing.  He is tachypneic at times breathing 30 times a minute.  At other times however his respiratory rate is completely normal.  General: No Fever or Chills, Cardiac: No Chest Pain or Palpitations, Resp: No Cough or SOA, GI: No Nausea, Vomiting, or Diarrhea, and Other: No bleeding    amitriptyline, 50 mg, Oral, Nightly  amLODIPine, 5 mg, Oral, Daily  apixaban, 5 mg, Oral, BID  furosemide, 20 mg, Oral, Daily  HYDROcodone-acetaminophen, 1 tablet, Oral, Nightly  insulin regular, 2-9 Units, Subcutaneous, Q6H  lansoprazole, 30 mg, Nasogastric, QAM AC  Menthol-Zinc Oxide, 1 application , Topical, 4x Daily  metroNIDAZOLE, 500 mg, Intravenous, Q8H  oxybutynin XL, 5 mg, Oral, Daily  sucralfate, 1 g, Oral, BID AC             Objective   Vital Signs  Temp:  [97.7 °F (36.5 °C)-98.6 °F (37 °C)] 98.6 °F (37 °C)  Heart Rate:  [77-96] 78  Resp:  [18-22] 18  BP: (130-154)/(58-66) 130/61  Body mass index is 22.22 kg/m².    Intake/Output Summary (Last 24 hours) at 2023 1244  Last data filed at 2023 0136  Gross per 24 hour   Intake 1607 ml   Output --   Net 1607 ml       Physical Exam  Vitals and nursing note reviewed.   Constitutional:       General: He is not in acute distress.     Appearance: Normal appearance. He is ill-appearing.   Cardiovascular:      Rate and Rhythm: Regular rhythm. Tachycardia present.   Pulmonary:      Effort: Pulmonary effort is normal. No respiratory distress.      Breath sounds: Normal breath sounds.       Comments: Verry poor cough reflex  Abdominal:      General: Bowel sounds are normal. There is no distension.      Palpations: Abdomen is soft.      Comments: Peg site clean and dry with out erythema or bleeding   Skin:     General: Skin is warm and dry.   Neurological:      Mental Status: He is alert.           Results Review:       I reviewed the patient's new clinical results.  Results from last 7 days   Lab Units 11/08/23  0945 11/07/23  0808 11/06/23  0707 11/05/23  0734 11/04/23  0743 11/03/23  0738 11/02/23  0854   WBC 10*3/mm3 14.48* 15.58* 18.14* 17.97* 15.04* 16.63* 17.16*   HEMOGLOBIN g/dL 9.0* 9.8* 10.7* 10.8* 10.8* 11.1* 11.9*   PLATELETS 10*3/mm3 339 335 292 265 197 187 159     Results from last 7 days   Lab Units 11/08/23  0945 11/07/23  0808 11/06/23  0707 11/05/23  0734 11/04/23  2231 11/04/23  0743 11/03/23  1336 11/02/23  0854   SODIUM mmol/L 136 138 137 136  --  136 134* 134*   POTASSIUM mmol/L 3.9 4.1 4.0 4.0  --  4.3 4.6 4.3   CHLORIDE mmol/L 104 107 103 103  --  103 100 100   CO2 mmol/L 26.8 26.0 26.0 27.0  --  25.0 23.8 24.0   BUN mg/dL 30* 30* 27* 35*  --  39* 42* 31*   CREATININE mg/dL 0.64* 0.69* 0.63* 0.70*  --  0.68* 0.83 0.73*   CALCIUM mg/dL 7.9* 8.3* 8.3* 8.3*  --  8.1* 8.1* 8.5*   MAGNESIUM mg/dL 2.0 2.1 2.2 1.9  --  2.1  --  2.1   PHOSPHORUS mg/dL 1.9* 2.6 2.7  --  2.8 2.2*  --   --    Estimated Creatinine Clearance: 89.2 mL/min (A) (by C-G formula based on SCr of 0.64 mg/dL (L)).      Assessment & Plan   Active Hospital Problems    Diagnosis  POA    **COVID-19 [U07.1]  Yes    Polycythemia [D75.1]  Yes    Chronic anticoagulation [Z79.01]  Not Applicable    PAD (peripheral artery disease) [I73.9]  Yes    Stenosis of carotid artery [I65.29]  Yes    S/P ablation of atrial flutter [Z98.890, Z86.79]  Not Applicable    S/P CABG (coronary artery bypass graft) [Z95.1]  Not Applicable    Coronary artery disease due to lipid rich plaque [I25.10, I25.83]  Yes    HLD (hyperlipidemia) [E78.5]   Yes    Benign essential hypertension [I10]  Yes    Cerebrovascular accident [I63.9]  Yes      Resolved Hospital Problems   No resolved problems to display.       PLAN  This is a 77 y.o. gentleman former smoker with a history of HTN, a flutter, CVA, HLD, CAD w/ h/o CABG, polycythemia vera, gout, chronic pain/opioid dependence that presents to [facility] complaining of generalized weakness and mechanical fall and was admitted with symptomatic COVID-19 infection  -He has completed steroids and treatment for his COVID-19 infection.  Still dealing with the sequela of this infection with ongoing dysphagia aspiration pneumonia and other medical comorbidities.  -He is more short of breath today per report.  Exam not really changed.  We will check a chest x-ray and a BNP to evaluate for pleural effusions and fluid issues.  My suspicion is that his respiratory issues may be more related to anxiety and his ongoing infection.  -He has completed cefepime and remains on Flagyl  -Tolerated PEG tube placement and initiation of tube feedings.  Tube feeds tolerated at goal  -Eliquis resumed covers for DVT prophylaxis  -likely resume statin at DC  -Overall prognosis is guarded at best, with prolonged hospital stay, oropharyngeal dysphagia and multiple acute and chronic issues  -Plan SNF tomorrow    Disposition  Expected Discharge Date: 11/8/2023; Expected Discharge Time:        Hayden Doll MD  [facility]  11/08/23  12:44 EST

## 2023-11-08 NOTE — PLAN OF CARE
Goal Outcome Evaluation:           Progress: no change  Outcome Evaluation: A&Ox4. Tube feeds infusing at goal. Antibiotics given per mar. New split gauze placed on peg tube. Ointment to bottom. incontinence care and frequent turns. Plan for DC today

## 2023-11-09 VITALS
RESPIRATION RATE: 18 BRPM | OXYGEN SATURATION: 100 % | TEMPERATURE: 98.5 F | HEIGHT: 68 IN | WEIGHT: 171.3 LBS | BODY MASS INDEX: 25.96 KG/M2 | SYSTOLIC BLOOD PRESSURE: 135 MMHG | DIASTOLIC BLOOD PRESSURE: 46 MMHG | HEART RATE: 76 BPM

## 2023-11-09 PROBLEM — D89.832 CYTOKINE RELEASE SYNDROME, GRADE 2: Status: ACTIVE | Noted: 2023-11-09

## 2023-11-09 LAB
ALBUMIN SERPL-MCNC: 2.6 G/DL (ref 3.5–5.2)
ALBUMIN/GLOB SERPL: 0.9 G/DL
ALP SERPL-CCNC: 102 U/L (ref 39–117)
ALT SERPL W P-5'-P-CCNC: 27 U/L (ref 1–41)
ANION GAP SERPL CALCULATED.3IONS-SCNC: 4.8 MMOL/L (ref 5–15)
AST SERPL-CCNC: 28 U/L (ref 1–40)
BILIRUB SERPL-MCNC: 0.8 MG/DL (ref 0–1.2)
BUN SERPL-MCNC: 26 MG/DL (ref 8–23)
BUN/CREAT SERPL: 40.6 (ref 7–25)
CALCIUM SPEC-SCNC: 8.2 MG/DL (ref 8.6–10.5)
CHLORIDE SERPL-SCNC: 104 MMOL/L (ref 98–107)
CO2 SERPL-SCNC: 27.2 MMOL/L (ref 22–29)
CREAT SERPL-MCNC: 0.64 MG/DL (ref 0.76–1.27)
DEPRECATED RDW RBC AUTO: 53 FL (ref 37–54)
EGFRCR SERPLBLD CKD-EPI 2021: 96.9 ML/MIN/1.73
ERYTHROCYTE [DISTWIDTH] IN BLOOD BY AUTOMATED COUNT: 13.3 % (ref 12.3–15.4)
GLOBULIN UR ELPH-MCNC: 2.8 GM/DL
GLUCOSE BLDC GLUCOMTR-MCNC: 118 MG/DL (ref 70–130)
GLUCOSE BLDC GLUCOMTR-MCNC: 127 MG/DL (ref 70–130)
GLUCOSE BLDC GLUCOMTR-MCNC: 135 MG/DL (ref 70–130)
GLUCOSE BLDC GLUCOMTR-MCNC: 153 MG/DL (ref 70–130)
GLUCOSE SERPL-MCNC: 138 MG/DL (ref 65–99)
HCT VFR BLD AUTO: 25.9 % (ref 37.5–51)
HGB BLD-MCNC: 8.8 G/DL (ref 13–17.7)
MAGNESIUM SERPL-MCNC: 2.1 MG/DL (ref 1.6–2.4)
MCH RBC QN AUTO: 37.9 PG (ref 26.6–33)
MCHC RBC AUTO-ENTMCNC: 34 G/DL (ref 31.5–35.7)
MCV RBC AUTO: 111.6 FL (ref 79–97)
PHOSPHATE SERPL-MCNC: 2.8 MG/DL (ref 2.5–4.5)
PLATELET # BLD AUTO: 342 10*3/MM3 (ref 140–450)
PMV BLD AUTO: 10.8 FL (ref 6–12)
POTASSIUM SERPL-SCNC: 4.3 MMOL/L (ref 3.5–5.2)
PROT SERPL-MCNC: 5.4 G/DL (ref 6–8.5)
RBC # BLD AUTO: 2.32 10*6/MM3 (ref 4.14–5.8)
SODIUM SERPL-SCNC: 136 MMOL/L (ref 136–145)
WBC NRBC COR # BLD: 13.9 10*3/MM3 (ref 3.4–10.8)

## 2023-11-09 PROCEDURE — 94664 DEMO&/EVAL PT USE INHALER: CPT

## 2023-11-09 PROCEDURE — 94640 AIRWAY INHALATION TREATMENT: CPT

## 2023-11-09 PROCEDURE — 80053 COMPREHEN METABOLIC PANEL: CPT | Performed by: HOSPITALIST

## 2023-11-09 PROCEDURE — 85027 COMPLETE CBC AUTOMATED: CPT | Performed by: HOSPITALIST

## 2023-11-09 PROCEDURE — 83735 ASSAY OF MAGNESIUM: CPT | Performed by: INTERNAL MEDICINE

## 2023-11-09 PROCEDURE — 82948 REAGENT STRIP/BLOOD GLUCOSE: CPT

## 2023-11-09 PROCEDURE — 25010000002 METRONIDAZOLE 500 MG/100ML SOLUTION: Performed by: INTERNAL MEDICINE

## 2023-11-09 PROCEDURE — 94799 UNLISTED PULMONARY SVC/PX: CPT

## 2023-11-09 PROCEDURE — 94760 N-INVAS EAR/PLS OXIMETRY 1: CPT

## 2023-11-09 PROCEDURE — 94761 N-INVAS EAR/PLS OXIMETRY MLT: CPT

## 2023-11-09 RX ORDER — IPRATROPIUM BROMIDE AND ALBUTEROL SULFATE 2.5; .5 MG/3ML; MG/3ML
3 SOLUTION RESPIRATORY (INHALATION) EVERY 4 HOURS PRN
Start: 2023-11-09

## 2023-11-09 RX ORDER — SUCRALFATE 1 G/1
1 TABLET ORAL
Start: 2023-11-09

## 2023-11-09 RX ORDER — OXYBUTYNIN CHLORIDE 5 MG/5ML
5 SYRUP ORAL 2 TIMES DAILY
Start: 2023-11-09

## 2023-11-09 RX ORDER — NALOXONE HYDROCHLORIDE 4 MG/.1ML
SPRAY NASAL
Qty: 2 EACH | Refills: 0 | Status: SHIPPED | OUTPATIENT
Start: 2023-11-09

## 2023-11-09 RX ORDER — OXYBUTYNIN CHLORIDE 5 MG/1
5 TABLET ORAL ONCE
Status: DISCONTINUED | OUTPATIENT
Start: 2023-11-10 | End: 2023-11-09 | Stop reason: HOSPADM

## 2023-11-09 RX ORDER — GUAIFENESIN 200 MG/10ML
200 LIQUID ORAL EVERY 4 HOURS PRN
Start: 2023-11-09

## 2023-11-09 RX ORDER — HYDROCODONE BITARTRATE AND ACETAMINOPHEN 5; 325 MG/1; MG/1
1-2 TABLET ORAL EVERY 8 HOURS PRN
Qty: 15 TABLET | Refills: 0 | Status: SHIPPED | OUTPATIENT
Start: 2023-11-09 | End: 2023-11-13

## 2023-11-09 RX ORDER — AMLODIPINE BESYLATE 5 MG/1
5 TABLET ORAL DAILY
Start: 2023-11-09

## 2023-11-09 RX ORDER — LANSOPRAZOLE 30 MG/1
30 TABLET, ORALLY DISINTEGRATING, DELAYED RELEASE ORAL
Start: 2023-11-10

## 2023-11-09 RX ADMIN — HYDROCODONE BITARTRATE AND ACETAMINOPHEN 1 TABLET: 10; 325 TABLET ORAL at 04:42

## 2023-11-09 RX ADMIN — LANSOPRAZOLE 30 MG: 15 TABLET, ORALLY DISINTEGRATING ORAL at 06:31

## 2023-11-09 RX ADMIN — AMLODIPINE BESYLATE 5 MG: 5 TABLET ORAL at 09:41

## 2023-11-09 RX ADMIN — APIXABAN 5 MG: 5 TABLET, FILM COATED ORAL at 09:41

## 2023-11-09 RX ADMIN — IPRATROPIUM BROMIDE AND ALBUTEROL SULFATE 3 ML: 2.5; .5 SOLUTION RESPIRATORY (INHALATION) at 05:19

## 2023-11-09 RX ADMIN — Medication 1 APPLICATION: at 09:43

## 2023-11-09 RX ADMIN — SUCRALFATE 1 G: 1 TABLET ORAL at 06:31

## 2023-11-09 RX ADMIN — FUROSEMIDE 20 MG: 20 TABLET ORAL at 09:41

## 2023-11-09 RX ADMIN — METRONIDAZOLE 500 MG: 500 INJECTION, SOLUTION INTRAVENOUS at 06:28

## 2023-11-09 RX ADMIN — Medication 1 APPLICATION: at 12:57

## 2023-11-09 NOTE — PROGRESS NOTES
Nutrition Services    Patient Name:  Madhu Santoro  YOB: 1945  MRN: [id number]  Admit Date:  10/23/2023    CCP called and stated [facility] may not get order of TF's in for pt before the weekend and asked to provide pt with enough TF's to last him through the weekend. Brought up 3 bags of Diabetisource AC TF's for pt to bring with him at d/c today.     Electronically signed by:  Iliana Ceja RD  11/09/23 17:01 EST

## 2023-11-09 NOTE — DISCHARGE INSTR - APPOINTMENTS
December 20, 2023 at 0915 am  Follow up with Dr. Ralph Bianchi  [facility]  [address] [id number] [phone]

## 2023-11-09 NOTE — DISCHARGE SUMMARY
Patient Name: Madhu Santoro  : 1945  MRN: [id number]    Date of Admission: 10/23/2023  Date of Discharge:  2023  Primary Care Physician: Damian Sanchez MD      Chief Complaint:   Fall      Discharge Diagnoses     Active Hospital Problems    Diagnosis  POA    **COVID-19 [U07.1]  Yes    Cytokine release syndrome, grade 2 [D89.832]  No    Polycythemia [D75.1]  Yes    Chronic anticoagulation [Z79.01]  Not Applicable    PAD (peripheral artery disease) [I73.9]  Yes    Stenosis of carotid artery [I65.29]  Yes    S/P ablation of atrial flutter [Z98.890, Z86.79]  Not Applicable    S/P CABG (coronary artery bypass graft) [Z95.1]  Not Applicable    Coronary artery disease due to lipid rich plaque [I25.10, I25.83]  Yes    HLD (hyperlipidemia) [E78.5]  Yes    Benign essential hypertension [I10]  Yes    Cerebrovascular accident [I63.9]  Yes      Resolved Hospital Problems   No resolved problems to display.        Hospital Course     Mr. Santoro is a 77 y.o. male with a history of peripheral artery disease, chronic anticoagulation, carotid artery disease, coronary artery disease, hypertension hyperlipidemia and prior stroke who presented to [facility] initially complaining of fall.  Please see the admitting history and physical for further details.  He was found to have weakness and fall with underlying COVID-19 infection.  He was admitted to the hospital for further evaluation and treatment.  He had a complicated hospitalization.  He was outside the window for treatment on admission with Paxlovid and so was started on Remdesivir LFTs were monitored and symptomatic medications were provided.  He had significant severe deconditioning on admission and had severe dysphagia that developed.  Dobbhoff tube was placed and he was initiated on tube feedings.  Given the weakness and deconditioning he did develop aspiration pneumonia and was treated with IV antibiotics during the course of his stay.  He  developed hypoxia and was started on steroids and these have been tapered at the time of discharge.  Despite supportive care he continued to have significant oropharyngeal dysphagia and recommendations were for PEG tube placement.  Family agreed to PEG tube and surgery was consulted and he was taken for PEG placement on 11/6.  Procedure itself was tolerated well.  Tube feeds were initiated and titrated to goal.  He continued to complain of some shortness of breath and chest x-ray was repeated and BNP was checked.  Repeat chest x-ray shows resolution of his pneumonia and improvement in his atelectasis.  His BNP was within normal limits.  At this time he has stabilized him is being transferred to a skilled nursing facility today for continued work with physical and occupational therapy and speech therapy in hopes of regaining his independence and returning home.  His overall prognosis is guarded at this point.  This has been a tough hospitalization for him and has been in the hospital for almost 17 days.  He will needs extensive rehab to obtain goals that family upset.  The plan of care was discussed with the patient and his daughter at the bedside.  Follow-up has been arranged with pulmonary in the outpatient setting.  It is recommended that he follow-up with his primary care physician within the next month.      Day of Discharge     Subjective:  He is feeling better today feels a little stronger is less short winded and short of breath at rest.    Physical Exam:  Temp:  [97.9 °F (36.6 °C)-98.8 °F (37.1 °C)] 97.9 °F (36.6 °C)  Heart Rate:  [76-95] 76  Resp:  [18-28] 18  BP: (122-134)/(60-77) 122/61  Body mass index is 26.05 kg/m².  Physical Exam  Vitals reviewed.   Constitutional:       General: He is not in acute distress.     Appearance: He is ill-appearing.   Cardiovascular:      Rate and Rhythm: Normal rate and regular rhythm.   Pulmonary:      Effort: Pulmonary effort is normal.      Breath sounds: Normal breath  sounds.   Abdominal:      General: Bowel sounds are normal.      Palpations: Abdomen is soft.   Musculoskeletal:      Right lower leg: No edema.      Left lower leg: No edema.   Skin:     General: Skin is warm and dry.   Neurological:      Mental Status: He is alert.         Consultants     Consult Orders (all) (From admission, onward)       Start     Ordered    11/03/23 1525  Inpatient General Surgery Consult  Once        Specialty:  General Surgery  Provider:  Jose Grijalva MD    11/03/23 1524    11/02/23 1350  Inpatient Palliative Care Team Consult  Once        Provider:  (Not yet assigned)    11/02/23 1350    11/02/23 1018  Inpatient Cardiology Consult  Once        Specialty:  Cardiology  Provider:  Eric Wright MD    11/02/23 1017    10/30/23 1054  Inpatient ENT Consult  Once        Specialty:  Otolaryngology  Provider:  Cameron Villavicencio MD    10/30/23 1053    10/27/23 0956  Inpatient Nutrition Consult  Once        Provider:  (Not yet assigned)    10/27/23 0956    10/25/23 0918  Inpatient Pulmonology Consult  Once        Specialty:  Pulmonary Disease  Provider:  Saman Starr Jr., MD    10/25/23 0917    10/23/23 0914  [facility] (on-call MD unless specified) Details  Once        Specialty:  Hospitalist  Provider:  (Not yet assigned)    10/23/23 0913                  Procedures     ESOPHAGOGASTRODUODENOSCOPY WITH PERCUTANEOUS ENDOSCOPIC GASTROSTOMY TUBE INSERTION    Imaging Results (All)       Procedure Component Value Units Date/Time    XR Chest 1 View [[id number]] Collected: 11/08/23 1318     Updated: 11/08/23 1323    Narrative:      XR CHEST 1 VW-     HISTORY: Male who is 77 years-old, dyspnea     TECHNIQUE: Frontal view of the chest     COMPARISON: 11/6/2023     FINDINGS: Previous NG tube has been removed. The heart size is  borderline. Pulmonary vasculature is unremarkable. Minimal residual  atelectasis or infiltrate at the left base appears decreased. No large  pleural effusion. No  pneumothorax. Otherwise stable.       Impression:      Minimal residual atelectasis or infiltrate at the left base  appears decreased.      This report was finalized on 11/8/2023 1:20 PM by Dr. Mukesh Bruce M.D on Workstation: [id number]       XR Chest 1 View [[id number]] Collected: 11/06/23 0719     Updated: 11/06/23 0724    Narrative:      ONE-VIEW PORTABLE CHEST AT 5:31 A.M.     HISTORY: Follow-up of possible pneumonia and pleural effusion in lower  left chest.     FINDINGS: The lungs are well expanded with nearly complete resolution of  the localized pneumonia and atelectasis in the left lower lobe noted on  the portable exam performed 4 days ago. The heart size is normal with  sternal wires from previous cardiac surgery. A feeding tube ends beyond  the stomach and the tip is not included on the current exam.     This report was finalized on 11/6/2023 7:21 AM by Dr. Vik Hawkins M.D  on Workstation: [id number]       FL Video Swallow Single Contrast [[id number]] Collected: 11/03/23 1720     Updated: 11/03/23 1723    Narrative:      VIDEO SWALLOW STUDY     HISTORY: Dysphagia.     Fluoroscopy was provided for the speech pathologist during a video  swallow study.     Penetration and aspiration was seen       Impression:      Fluoroscopy was provided for the speech pathologist during a  video swallow study. For full details please see the speech pathology  report.     This report was finalized on 11/3/2023 5:20 PM by Dr. Avel Knox M.D  on Workstation: [id number]       CT Chest Without Contrast Diagnostic [[id number]] Collected: 11/03/23 1255     Updated: 11/03/23 1312    Narrative:      CT CHEST WITHOUT CONTRAST     HISTORY: 77-year-old male COVID-positive. Evaluate for pneumonia.     TECHNIQUE: Radiation dose reduction techniques were utilized, including  automated exposure control and exposure modulation based on body size.   3 mm images were obtained through the chest without the administration  of IV  contrast. Compared with recent chest radiographs.     FINDINGS: Extensive breathing artifact.  1. There is a large dense airspace consolidation at the left lower lobe  and much smaller airspace consolidations at the lingula and the  dependent aspect of the right lower lobe. The dense consolidations are  suspicious for bronchial pneumonia as opposed to viral pneumonia. There  are no pleural or pericardial effusions.     2. The patient underwent a video swallow examination approximately 2  hours prior to the CT and the barium is present within the esophagus  from just above the level of the veronica down into the stomach. There is  also a feeding tube in place and the distal tip is at the jejunum. There  are some small bright densities within the left lower lobe consolidation  likely from aspiration of the barium, and aspiration should be  considered an etiology for the multifocal pneumonia.     This report was finalized on 11/3/2023 1:09 PM by Dr. Faith Gilman M.D on  Workstation: [id number]       XR Chest 1 View [[id number]] Collected: 11/02/23 1234     Updated: 11/02/23 1240    Narrative:      XR CHEST 1 VW-     HISTORY: Male who is 77 years-old, pleural effusion, follow-up     TECHNIQUE: Frontal view of the chest     COMPARISON: 11/1/2023     FINDINGS: NG tube appears to extend to the proximal jejunum. Sternotomy  wires are present. Heart size is normal. Pulmonary vasculature is  unremarkable. Infiltrate is apparent at the left base, similar to prior  exam. There may be minimal left pleural effusion. No pneumothorax. No  acute osseous process.       Impression:      No significant change.     This report was finalized on 11/2/2023 12:37 PM by Dr. Mukesh Bruce M.D on Workstation: [id number]       XR Chest 1 View [[id number]] Collected: 11/01/23 1228     Updated: 11/01/23 1345    Narrative:      XR CHEST 1 VW-     HISTORY: 77-year-old male with possible aspiration. COVID.     FINDINGS: There is an airspace  consolidation at the lingula and the left  lower lobe which likely represents a combination of atelectasis and  pneumonia. There is also likely a small to moderate left effusion. There  is no CHF.     Feeding tube catheter is noted within the esophagus and visualized  stomach, distal aspect is not included.     This report was finalized on 11/1/2023 1:42 PM by Dr. Faith Gilman M.D on  Workstation: [id number]       XR Chest 1 View [[id number]] Collected: 10/25/23 0812     Updated: 10/25/23 0816    Narrative:      Portable chest x-ray     HISTORY: Increased oxygen need.     TECHNIQUE: Portable chest x-ray is provided and correlated with x-ray  October 23, 2023. This image was acquired and an exaggerated reverse  lordotic configuration which partly obscures visualization of the lung  bases.     FINDINGS: Sternal wires with new asymmetric density in the left mid and  lower lung zones. The visualized right lung appears clear. Vascular  volume is normal.       Impression:      New opacity in the left lung is asymmetric and favored  represent pneumonia.     This report was finalized on 10/25/2023 8:13 AM by Dr. Jose Antonio Ortega M.D on Workstation: [id number]       CT Head Without Contrast [[id number]] Collected: 10/23/23 0826     Updated: 10/24/23 0810    Narrative:      EMERGENCY CT SCAN OF THE HEAD WITHOUT CONTRAST ON 10/23/2023     CLINICAL HISTORY: Patient fell, left-sided head trauma, headache,  patient is on anticoagulation.     TECHNIQUE: Spiral CT images were obtained from the base of the skull to  the vertex without intravenous contrast. The images were reformatted and  submitted in 3 mm thick axial, sagittal and coronal CT sections with  brain algorithm and 2 mm thick axial CT sections with high-resolution  bone algorithm.     This is correlated to prior head CT without contrast from [facility] on 02/19/2023 and a prior MRI of the brain on 03/16/2015.     FINDINGS: There is some patchy low-density  extending from the  periventricular into the subcortical white matter of the cerebral  hemispheres consistent with mild-to-moderate small vessel disease. There  is a small 3-4 mm old lacunar infarct in the inferior lateral right  thalamus. There are tiny old lacunar infarcts in the anterior and mid  left corona radiata region. There is a focal area of encephalomalacia  involving the posterior superior left frontal lobe measuring 14 x 10 mm  in size compatible with an old posterior superior left frontal infarct  in the left MCA territory and this is unchanged dating back to an MRI of  the brain on 03/16/2015. The remainder of the brain parenchyma is normal  in attenuation. The ventricles are normal in size. I see no focal mass  effect. There is no midline shift. No extra axial fluid collections are  identified. There is no evidence of acute intracranial hemorrhage. No  acute skull fracture is identified. The calvarium and skull base are  normal in appearance. There is a 3 mm calcific density in the  subcutaneous fat of the scalp overlying the anterior inferior left  frontal bone just above the left orbit, unchanged. The paranasal sinuses  and the mastoid air cells and middle ear cavities are clear.       Impression:      1. No acute intracranial abnormality is identified.  2. There is mild-to-moderate small vessel disease in the cerebral white  matter. There is a 4 mm old lacunar infarct in the inferior lateral  right thalamus and tiny old lacunar infarcts in the anterior mid left  corona radiata region and there is a 14 x 10 mm old posterior superior  lateral left frontal cortical infarct in the left MCA territory.  3. The remainder of the head CT is within normal limits. Specifically,  no acute skull fracture or intracranial hemorrhage is identified.     Radiation dose reduction techniques were utilized, including automated  exposure control and exposure modulation based on body size.        This report was  finalized on 10/24/2023 8:07 AM by Dr. Christiano Leon M.D  on Workstation: [id number]       XR Chest 1 View [[id number]] Collected: 10/23/23 0749     Updated: 10/23/23 0754    Narrative:      EXAM: XR CHEST 1 VW-     INDICATION: Weakness     COMPARISON: Radiographs dating back to 3/10/2015          Impression:      No focal consolidation. No pleural effusion or pneumothorax.   Normal size cardiomediastinal silhouette. Nondisplaced median  sternotomy wires.        This report was finalized on 10/23/2023 7:51 AM by Dr. Alexander Causey M.D on Workstation: [id number]             Results for orders placed in visit on 12/04/19    SCANNED VASCULAR STUDIES    Results for orders placed during the hospital encounter of 10/23/23    Adult Transthoracic Echo Complete W/ Cont if Necessary Per Protocol    Interpretation Summary    Left ventricular systolic function is normal. Calculated left ventricular EF = 62.8%    Left ventricular wall thickness is consistent with concentric remodelling.    Left ventricular diastolic function was normal.    Normal right ventricular size and function present.    Pertinent Labs     Results from last 7 days   Lab Units 11/09/23  0644 11/08/23  0945 11/07/23  0808 11/06/23  0707   WBC 10*3/mm3 13.90* 14.48* 15.58* 18.14*   HEMOGLOBIN g/dL 8.8* 9.0* 9.8* 10.7*   PLATELETS 10*3/mm3 342 339 335 292     Results from last 7 days   Lab Units 11/09/23  0644 11/08/23  0945 11/07/23  0808 11/06/23  0707   SODIUM mmol/L 136 136 138 137   POTASSIUM mmol/L 4.3 3.9 4.1 4.0   CHLORIDE mmol/L 104 104 107 103   CO2 mmol/L 27.2 26.8 26.0 26.0   BUN mg/dL 26* 30* 30* 27*   CREATININE mg/dL 0.64* 0.64* 0.69* 0.63*   GLUCOSE mg/dL 138* 146* 126* 98   EGFR mL/min/1.73 96.9 96.9 94.7 97.4     Results from last 7 days   Lab Units 11/09/23  0644 11/08/23  0945 11/07/23  0808 11/06/23  0707   ALBUMIN g/dL 2.6* 2.3* 2.7* 2.5*   BILIRUBIN mg/dL 0.8 0.8 1.4* 1.5*   ALK PHOS U/L 102 100 104 104   AST (SGOT) U/L 28 26 38 44*   ALT  "(SGPT) U/L 27 28 44* 48*     Results from last 7 days   Lab Units 11/09/23  0644 11/08/23  2342 11/08/23  0945 11/07/23  0808 11/06/23  0707   CALCIUM mg/dL 8.2*  --  7.9* 8.3* 8.3*   ALBUMIN g/dL 2.6*  --  2.3* 2.7* 2.5*   MAGNESIUM mg/dL 2.1  --  2.0 2.1 2.2   PHOSPHORUS mg/dL  --  2.8 1.9* 2.6 2.7       Results from last 7 days   Lab Units 11/08/23  0945 11/02/23  1735   HSTROP T ng/L  --  12   PROBNP pg/mL 201.0  --            Invalid input(s): \"LDLCALC\"  Results from last 7 days   Lab Units 11/02/23  1128   BLOODCX  No growth at 5 days         Test Results Pending at Discharge       Discharge Details        Discharge Medications        New Medications        Instructions Start Date   guaifenesin 100 MG/5ML liquid  Commonly known as: ROBITUSSIN   200 mg, Oral, Every 4 Hours PRN      ipratropium-albuterol 0.5-2.5 mg/3 ml nebulizer  Commonly known as: DUO-NEB   3 mL, Nebulization, Every 4 Hours PRN      lansoprazole 30 MG Tablet Delayed Release Dispersible disintegrating tablet  Commonly known as: PREVACID SOLUTAB   30 mg, Nasogastric, Every Morning Before Breakfast   Start Date: November 10, 2023     naloxone 4 MG/0.1ML nasal spray  Commonly known as: NARCAN   Call 911. Don't prime. [place] in 1 nostril for overdose. Repeat in 2-3 minutes in other nostril if no or minimal breathing/responsiveness.      oxyBUTYnin 5 MG/5ML solution oral solution  Commonly known as: DITROPAN   5 mg, Per G Tube, 2 Times Daily      sucralfate 1 g tablet  Commonly known as: CARAFATE   1 g, Oral, 2 Times Daily Before Meals             Changes to Medications        Instructions Start Date   amLODIPine 5 MG tablet  Commonly known as: NORVASC  What changed:   medication strength  how much to take   5 mg, Oral, Daily      HYDROcodone-acetaminophen 5-325 MG per tablet  Commonly known as: NORCO  What changed:   how much to take  reasons to take this  additional instructions  Another medication with the same name was removed. Continue taking " this medication, and follow the directions you see here.   1-2 tablets, Oral, Every 8 Hours PRN             Continue These Medications        Instructions Start Date   amitriptyline 50 MG tablet  Commonly known as: ELAVIL   TAKE ONE TABLET BY MOUTH ONCE NIGHTLY      apixaban 5 MG tablet tablet  Commonly known as: ELIQUIS   5 mg, Oral, 2 Times Daily      furosemide 20 MG tablet  Commonly known as: LASIX   TAKE ONE TABLET BY MOUTH DAILY      mupirocin 2 % cream  Commonly known as: BACTROBAN   1 application , Topical, 2 Times Daily      rosuvastatin 20 MG tablet  Commonly known as: Crestor   20 mg, Oral, Daily             Stop These Medications      allopurinol 300 MG tablet  Commonly known as: Zyloprim     hydroxyurea 500 MG capsule  Commonly known as: HYDREA     Ibuprofen 3 %, Gabapentin 10 %, Baclofen 2 %, lidocaine 4 %, Ketamine HCl 4 %     losartan 50 MG tablet  Commonly known as: Cozaar     Myrbetriq 25 MG tablet sustained-release 24 hour 24 hr tablet  Generic drug: Mirabegron ER     potassium chloride 10 MEQ CR tablet     RABEprazole 20 MG EC tablet  Commonly known as: Aciphex              Allergies   Allergen Reactions    Atorvastatin Myalgia     Myalgia    Penicillins Hives, Swelling and Rash     Tolerates cephalosporins        Discharge Disposition:  Skilled Nursing Facility (DC - External)      Discharge Diet:  Diet Order   Procedures    NPO Diet NPO Type: Strict NPO       Discharge Activity:   Activity Instructions       Activity as Tolerated              CODE STATUS:    Code Status and Medical Interventions:   Ordered at: 10/23/23 1311     Code Status (Patient has no pulse and is not breathing):    CPR (Attempt to Resuscitate)     Medical Interventions (Patient has pulse or is breathing):    Full Support       Future Appointments   Date Time Provider Department Center   12/12/2023 11:20 AM Marisol Perales APRN [facility] PM [facility]   1/16/2024 11:20 AM Massimo Jordan MD [facility] CD [facility]   2/12/2024   2:00 PM Daxa NisaMY badillo [facility] N [facility]     Additional Instructions for the Follow-ups that You Need to Schedule       Discharge Follow-up with PCP   As directed       Currently Documented PCP:    Damian Sanchez MD    PCP Phone Number:    [phone]     Follow Up Details: 3-4 weeks        Discharge Follow-up with Specified Provider: Pulmonary; 6 Weeks   As directed      To: Pulmonary   Follow Up: 6 Weeks        CT chest wo contrast   Dec 08, 2023      Does this exam require Anibal Bronch Protocol?: No   Release to patient: Routine Release               Contact information for follow-up providers       Ralph Bianchi MD. Schedule an appointment as soon as possible for a visit in 1 month(s).    Specialties: Pulmonary Disease, Sleep Medicine  Why: after CT chest  Contact information:  [address] [facility] [address]  [facility] [id number] [phone]               Damian Sanchez MD .    Specialty: Family Medicine  Why: 3-4 weeks  Contact information:  [address]  [facility] [id number] [phone]                       Contact information for after-discharge care       Destination       [facility] .    Service: Skilled Nursing  Contact information:  [address] [facility] [address] [phone]                                   Additional Instructions for the Follow-ups that You Need to Schedule       Discharge Follow-up with PCP   As directed       Currently Documented PCP:    Damian Sanchez MD    PCP Phone Number:    [phone]     Follow Up Details: 3-4 weeks        Discharge Follow-up with Specified Provider: Pulmonary; 6 Weeks   As directed      To: Pulmonary   Follow Up: 6 Weeks        CT chest wo contrast   Dec 08, 2023      Does this exam require Anibal Bronch Protocol?: No   Release to patient: Routine Release            Time Spent on Discharge:  Greater than 30 minutes      Hayden Doll MD  [facility]  11/09/23  10:21  EST

## 2023-11-09 NOTE — PROGRESS NOTES
"                                              LOS: 15 days   Patient Care Team:  Damian Sanchez MD as PCP - General (Family Medicine)  Jr Temo Cortez MD as Surgeon (Cardiothoracic Surgery)  Netta Mayorga Jr., MD as Consulting Physician (Vascular Surgery)  Damian Sanchez MD as Referring Physician (Family Medicine)  Aquiles Lopez MD as Consulting Physician (Hematology and Oncology)  Christy Luther APRN as Nurse Practitioner (Nurse Practitioner)  Damian Sanchez MD as Referring Physician (Family Medicine)    Chief Complaint:  F/up pneumonia, COVID infection    Subjective   Interval History    Denies dyspnea or cough.  On RA.      Ventilator/Non-Invasive Ventilation Settings (From admission, onward)      None                  Physical Exam:     Vital Signs  Temp:  [97.9 °F (36.6 °C)-98.8 °F (37.1 °C)] 98.5 °F (36.9 °C)  Heart Rate:  [76-95] 76  Resp:  [18-28] 18  BP: (122-135)/(46-77) 135/46    Intake/Output Summary (Last 24 hours) at 11/9/2023 1329  Last data filed at 11/9/2023 0354  Gross per 24 hour   Intake 1347 ml   Output 280 ml   Net 1067 ml     Flowsheet Rows      Flowsheet Row First Filed Value   Admission Height 172.7 cm (68\") Documented at 10/23/2023 0536   Admission Weight 78.8 kg (173 lb 12.8 oz) Documented at 10/23/2023 0536            PPE used per hospital policy    General Appearance:   Alert, cooperative, in no acute distress   ENMT:  Mallampati score 3, dry mucous membrane   Eyes:  Pupils equal and reactive to light. EOMI                       Neuro/psych:  Conscious, alert, oriented x3. Strength 4/5 in upper and lower  ext.  Appropriate mood and affect   Extremities:  No cyanosis, clubbing but mild edema in distal legs.  Warm extremities and well-perfused          Results Review:        Results from last 7 days   Lab Units 11/09/23  0644 11/08/23  0945 11/07/23  0808   SODIUM mmol/L 136 136 138   POTASSIUM mmol/L 4.3 3.9 4.1   CHLORIDE mmol/L 104 104 107   CO2 mmol/L 27.2 26.8 " 26.0   BUN mg/dL 26* 30* 30*   CREATININE mg/dL 0.64* 0.64* 0.69*   GLUCOSE mg/dL 138* 146* 126*   CALCIUM mg/dL 8.2* 7.9* 8.3*     Results from last 7 days   Lab Units 11/02/23  1735   HSTROP T ng/L 12     Results from last 7 days   Lab Units 11/09/23  0644 11/08/23  0945 11/07/23  0808   WBC 10*3/mm3 13.90* 14.48* 15.58*   HEMOGLOBIN g/dL 8.8* 9.0* 9.8*   HEMATOCRIT % 25.9* 26.2* 29.0*   PLATELETS 10*3/mm3 342 339 335         Results from last 7 days   Lab Units 11/08/23  0945   PROBNP pg/mL 201.0                           I reviewed the patient's new clinical results.        Medication Review:   amitriptyline, 50 mg, Oral, Nightly  amLODIPine, 5 mg, Oral, Daily  apixaban, 5 mg, Oral, BID  furosemide, 20 mg, Oral, Daily  HYDROcodone-acetaminophen, 1 tablet, Oral, Nightly  insulin regular, 2-9 Units, Subcutaneous, Q6H  lansoprazole, 30 mg, Nasogastric, QAM AC  Menthol-Zinc Oxide, 1 application , Topical, 4x Daily  [START ON 11/10/2023] oxybutynin, 5 mg, Oral, Once  sucralfate, 1 g, Oral, BID AC               Diagnostic imaging:  I personally and independently reviewed the following images:    CT chest 11/30/2023: LLL consolidation.  Mild nodular infiltrates in the RLL..  Otherwise exam limited due to motion artifact.      Assessment     COVID-19 pneumonia  Bilateral lower lobe consolidations, L>R, likely bacterial pneumonia  Dysphagia    A-fib  CAD s/p CABG  HTN  Chronic pain      Plan         Antibiotics: Finished cefepime and Flagyl  Anticoagulation with Eliquis  Tube feeding via PEG tube  Incentive spirometry  Repeat CXR or CT chest as outpatient in about 1 month to ensure resolution of the pneumonia.    Dispo: SNU today therapy.  CT chest and follow-up apt ordered    Ralph Bianchi MD  11/09/23  13:29 EST          This note was dictated utilizing [employer] dictation

## 2023-11-09 NOTE — PLAN OF CARE
Goal Outcome Evaluation:  Plan of Care Reviewed With: patient        Progress: improving  Outcome Evaluation: Frequent repositioning provided. New dressing to PEG PRN breathing treatments ordered for complaints of shortness of breath. TFs infusing at goal. Complaints of pain treated with PRN medication. Plans for DC to snf today.

## 2023-11-10 NOTE — PROGRESS NOTES
Continued Stay Note  [facility]     Patient Name: Madhu Santoro  MRN: [id number]  Today's Date: 11/10/2023    Admit Date: 10/23/2023    [place] [facility] on Thursday 11/9 @ 1530 PENDING DC ORDERS   Discharge Plan       Row Name 11/10/23 1428       Plan    Final Discharge Disposition Code 03 - skilled nursing facility (SNF)    Final Note [place] [facility] [place] [facility]                   Discharge Codes    No documentation.                 Expected Discharge Date and Time       Expected Discharge Date Expected Discharge Time    Nov 9, 2023               CHANDRAKANT Sadler

## 2023-11-10 NOTE — PROGRESS NOTES
"Enter Query Response Below      Query Response: Severe Sepsis, ruled in and treated              If applicable, please update the problem list.     Patient: Madhu Santoro        : 1945  Account: [id number]           Admit Date: 10/23/2023        How to Respond to this query:       a. Click New Note     b. Answer query within the yellow box.                c. Update the Problem List, if applicable.      If you have any questions about this query contact me at: [email or web address]         77 year old man admitted 10/23 with COVID 19, inpatient order 10/25.  Pulmonology notes 10/25 - 10/29 state a diagnosis of Severe Sepsis.  Hospitalist notes 10/27 - 10/31 state: \"COVID-pneumonia with secondary bacterial component and resolving sepsis.\"  Hospitalist notes  to  and DC Summary without mention of Sepsis.  Sepsis indicators from admit: Temp 102.2, , RR 32, WBC 3.99, 16.18, lactate 3.4, 4.5, procal 3.88.  Treatment includes IV remdesivir, IV cefepime x12 days, IV Flagyl x8 days, IV and po Decadron, 2.5L NS boluses, 1L LR boluses.     Please document if the diagnosis of Severe Sepsis was determined to be:  - Severe Sepsis, ruled in and treated  - Severe Sepsis ruled out  - Other, please specify  - Unable to determine       By submitting this query, we are merely seeking further clarification of documentation to accurately reflect all conditions that you are monitoring, evaluating, treating or that extend the hospitalization or utilize additional resources of care. Please utilize your independent clinical judgment when addressing the question(s) above.     This query and your response, once completed, will be entered into the legal medical record.    Sincerely,   Lo Aranda RN, BSN  [email or web address]  Clinical Documentation Integrity Program  "

## 2023-11-13 ENCOUNTER — TELEPHONE (OUTPATIENT)
Dept: CARDIOLOGY | Facility: CLINIC | Age: 78
End: 2023-11-13
Payer: MEDICARE

## 2023-11-13 NOTE — TELEPHONE ENCOUNTER
11/13/23  09:29 EST    Patient will need a 1 week hospital follow-up scheduled with me.  Scheduling, please arrange.      MY Tabor  [facility]

## 2023-11-16 NOTE — ED NOTES
"Patient ambulatory to triage c/o L sided flank/abdominal pain. Patient states \"I think I have a kidney stone\". Patient states he was told by Dr Oliveira \"about a month ago\" that he has a kidney stone. Patient denies N/V. Patient states this pain began yesterday afternoon.  "
"Patient reports left flank pain, dysuria, frequency, hesitancy, urgency and burning with urination since yesterday afternoon. Patient states he feels like hes \"going to bust\" from the pressure to his left flank. Patient states he was seen by his doctor about a month ago and was told he had a kidney stone.   "
Attempted to call report x's 1. Nurse unavailable. Will call back.  
This RN wearing all appropriate PPE during patient encounter. Hand hygiene performed before and during entering room.    
[employer]

## 2023-11-22 ENCOUNTER — HOSPITAL ENCOUNTER (INPATIENT)
Facility: HOSPITAL | Age: 78
LOS: 5 days | Discharge: NURSING, REHAB OR LONG-TERM CARE | DRG: 377 | End: 2023-11-29
Attending: STUDENT IN AN ORGANIZED HEALTH CARE EDUCATION/TRAINING PROGRAM | Admitting: HOSPITALIST
Payer: MEDICARE

## 2023-11-22 ENCOUNTER — APPOINTMENT (OUTPATIENT)
Dept: GENERAL RADIOLOGY | Facility: HOSPITAL | Age: 78
DRG: 377 | End: 2023-11-22
Payer: MEDICARE

## 2023-11-22 DIAGNOSIS — R09.02 HYPOXIA: ICD-10-CM

## 2023-11-22 DIAGNOSIS — G89.29 CHRONIC BILATERAL LOW BACK PAIN WITHOUT SCIATICA: ICD-10-CM

## 2023-11-22 DIAGNOSIS — E78.5 HYPERLIPIDEMIA, UNSPECIFIED HYPERLIPIDEMIA TYPE: Chronic | ICD-10-CM

## 2023-11-22 DIAGNOSIS — R53.1 WEAKNESS: Primary | ICD-10-CM

## 2023-11-22 DIAGNOSIS — M51.37 DDD (DEGENERATIVE DISC DISEASE), LUMBOSACRAL: ICD-10-CM

## 2023-11-22 DIAGNOSIS — M19.90 ARTHRITIS: ICD-10-CM

## 2023-11-22 DIAGNOSIS — R79.89 ELEVATED TROPONIN: ICD-10-CM

## 2023-11-22 DIAGNOSIS — R60.9 PERIPHERAL EDEMA: ICD-10-CM

## 2023-11-22 DIAGNOSIS — M54.50 CHRONIC BILATERAL LOW BACK PAIN WITHOUT SCIATICA: ICD-10-CM

## 2023-11-22 PROBLEM — L89.159 SACRAL DECUBITUS ULCER: Status: ACTIVE | Noted: 2023-11-22

## 2023-11-22 PROBLEM — D72.829 LEUKOCYTOSIS: Status: ACTIVE | Noted: 2023-11-22

## 2023-11-22 PROBLEM — K22.70 BARRETT ESOPHAGUS: Status: ACTIVE | Noted: 2023-11-22

## 2023-11-22 PROBLEM — Z93.1 S/P PERCUTANEOUS ENDOSCOPIC GASTROSTOMY (PEG) TUBE PLACEMENT: Status: ACTIVE | Noted: 2023-11-22

## 2023-11-22 PROBLEM — D64.9 ANEMIA: Status: ACTIVE | Noted: 2023-11-22

## 2023-11-22 PROBLEM — Z86.73 HISTORY OF CVA (CEREBROVASCULAR ACCIDENT): Status: ACTIVE | Noted: 2023-11-22

## 2023-11-22 LAB
ABO GROUP BLD: NORMAL
ALBUMIN SERPL-MCNC: 2.5 G/DL (ref 3.5–5.2)
ALBUMIN/GLOB SERPL: 0.8 G/DL
ALP SERPL-CCNC: 269 U/L (ref 39–117)
ALT SERPL W P-5'-P-CCNC: 66 U/L (ref 1–41)
ANION GAP SERPL CALCULATED.3IONS-SCNC: 7.5 MMOL/L (ref 5–15)
AST SERPL-CCNC: 69 U/L (ref 1–40)
B PARAPERT DNA SPEC QL NAA+PROBE: NOT DETECTED
B PERT DNA SPEC QL NAA+PROBE: NOT DETECTED
BILIRUB SERPL-MCNC: 0.7 MG/DL (ref 0–1.2)
BILIRUB UR QL STRIP: NEGATIVE
BLD GP AB SCN SERPL QL: NEGATIVE
BUN SERPL-MCNC: 18 MG/DL (ref 8–23)
BUN/CREAT SERPL: 26.1 (ref 7–25)
C PNEUM DNA NPH QL NAA+NON-PROBE: NOT DETECTED
CALCIUM SPEC-SCNC: 8.3 MG/DL (ref 8.6–10.5)
CHLORIDE SERPL-SCNC: 95 MMOL/L (ref 98–107)
CLARITY UR: ABNORMAL
CO2 SERPL-SCNC: 32.5 MMOL/L (ref 22–29)
COLOR UR: ABNORMAL
CREAT SERPL-MCNC: 0.69 MG/DL (ref 0.76–1.27)
DEPRECATED RDW RBC AUTO: 61.1 FL (ref 37–54)
EGFRCR SERPLBLD CKD-EPI 2021: 94.7 ML/MIN/1.73
ERYTHROCYTE [DISTWIDTH] IN BLOOD BY AUTOMATED COUNT: 15.4 % (ref 12.3–15.4)
FERRITIN SERPL-MCNC: 566 NG/ML (ref 30–400)
FLUAV SUBTYP SPEC NAA+PROBE: NOT DETECTED
FLUBV RNA ISLT QL NAA+PROBE: NOT DETECTED
GLOBULIN UR ELPH-MCNC: 3.3 GM/DL
GLUCOSE SERPL-MCNC: 122 MG/DL (ref 65–99)
GLUCOSE UR STRIP-MCNC: NEGATIVE MG/DL
HADV DNA SPEC NAA+PROBE: NOT DETECTED
HCOV 229E RNA SPEC QL NAA+PROBE: NOT DETECTED
HCOV HKU1 RNA SPEC QL NAA+PROBE: NOT DETECTED
HCOV NL63 RNA SPEC QL NAA+PROBE: NOT DETECTED
HCOV OC43 RNA SPEC QL NAA+PROBE: NOT DETECTED
HCT VFR BLD AUTO: 23 % (ref 37.5–51)
HGB BLD-MCNC: 7.3 G/DL (ref 13–17.7)
HGB UR QL STRIP.AUTO: NEGATIVE
HMPV RNA NPH QL NAA+NON-PROBE: NOT DETECTED
HPIV1 RNA ISLT QL NAA+PROBE: NOT DETECTED
HPIV2 RNA SPEC QL NAA+PROBE: NOT DETECTED
HPIV3 RNA NPH QL NAA+PROBE: NOT DETECTED
HPIV4 P GENE NPH QL NAA+PROBE: NOT DETECTED
HYPOCHROMIA BLD QL: ABNORMAL
HYPOCHROMIA BLD QL: ABNORMAL
IRON 24H UR-MRATE: 42 MCG/DL (ref 59–158)
IRON SATN MFR SERPL: 19 % (ref 20–50)
KETONES UR QL STRIP: NEGATIVE
LEUKOCYTE ESTERASE UR QL STRIP.AUTO: NEGATIVE
LYMPHOCYTES # BLD MANUAL: 0.47 10*3/MM3 (ref 0.7–3.1)
LYMPHOCYTES # BLD MANUAL: 2.96 10*3/MM3 (ref 0.7–3.1)
LYMPHOCYTES NFR BLD MANUAL: 6 % (ref 5–12)
LYMPHOCYTES NFR BLD MANUAL: 9 % (ref 5–12)
M PNEUMO IGG SER IA-ACNC: NOT DETECTED
MCH RBC QN AUTO: 35.1 PG (ref 26.6–33)
MCHC RBC AUTO-ENTMCNC: 31.7 G/DL (ref 31.5–35.7)
MCV RBC AUTO: 110.6 FL (ref 79–97)
METAMYELOCYTES NFR BLD MANUAL: 3 % (ref 0–0)
MONOCYTES # BLD: 0.93 10*3/MM3 (ref 0.1–0.9)
MONOCYTES # BLD: 1.4 10*3/MM3 (ref 0.1–0.9)
MYELOCYTES NFR BLD MANUAL: 2 % (ref 0–0)
NEUTROPHILS # BLD AUTO: 10.11 10*3/MM3 (ref 1.7–7)
NEUTROPHILS # BLD AUTO: 12.6 10*3/MM3 (ref 1.7–7)
NEUTROPHILS NFR BLD MANUAL: 65 % (ref 42.7–76)
NEUTROPHILS NFR BLD MANUAL: 81 % (ref 42.7–76)
NITRITE UR QL STRIP: NEGATIVE
OTHER CELLS %: 10 % (ref 0–0)
PH UR STRIP.AUTO: 8.5 [PH] (ref 5–8)
PLAT MORPH BLD: NORMAL
PLAT MORPH BLD: NORMAL
PLATELET # BLD AUTO: 456 10*3/MM3 (ref 140–450)
PMV BLD AUTO: 10.2 FL (ref 6–12)
POLYCHROMASIA BLD QL SMEAR: ABNORMAL
POLYCHROMASIA BLD QL SMEAR: ABNORMAL
POTASSIUM SERPL-SCNC: 3.3 MMOL/L (ref 3.5–5.2)
PROCALCITONIN SERPL-MCNC: 0.67 NG/ML (ref 0–0.25)
PROMYELOCYTES NFR BLD MANUAL: 2 % (ref 0–0)
PROT SERPL-MCNC: 5.8 G/DL (ref 6–8.5)
PROT UR QL STRIP: ABNORMAL
QT INTERVAL: 372 MS
QTC INTERVAL: 463 MS
RBC # BLD AUTO: 2.08 10*6/MM3 (ref 4.14–5.8)
RETICS # AUTO: 0.24 10*6/MM3 (ref 0.02–0.13)
RETICS/RBC NFR AUTO: 11.46 % (ref 0.7–1.9)
RH BLD: NEGATIVE
RHINOVIRUS RNA SPEC NAA+PROBE: NOT DETECTED
ROULEAUX BLD QL SMEAR: ABNORMAL
RSV RNA NPH QL NAA+NON-PROBE: NOT DETECTED
SARS-COV-2 RNA NPH QL NAA+NON-PROBE: NOT DETECTED
SMUDGE CELLS BLD QL SMEAR: ABNORMAL
SODIUM SERPL-SCNC: 135 MMOL/L (ref 136–145)
SP GR UR STRIP: 1.01 (ref 1–1.03)
STOMATOCYTES BLD QL SMEAR: ABNORMAL
T&S EXPIRATION DATE: NORMAL
TIBC SERPL-MCNC: 222 MCG/DL (ref 298–536)
TRANSFERRIN SERPL-MCNC: 149 MG/DL (ref 200–360)
TROPONIN T SERPL HS-MCNC: 46 NG/L
TROPONIN T SERPL HS-MCNC: 49 NG/L
UROBILINOGEN UR QL STRIP: ABNORMAL
VARIANT LYMPHS NFR BLD MANUAL: 16 % (ref 19.6–45.3)
VARIANT LYMPHS NFR BLD MANUAL: 3 % (ref 0–5)
VARIANT LYMPHS NFR BLD MANUAL: 3 % (ref 19.6–45.3)
WBC MORPH BLD: NORMAL
WBC NRBC COR # BLD AUTO: 15.56 10*3/MM3 (ref 3.4–10.8)

## 2023-11-22 PROCEDURE — G0378 HOSPITAL OBSERVATION PER HR: HCPCS

## 2023-11-22 PROCEDURE — 86900 BLOOD TYPING SEROLOGIC ABO: CPT | Performed by: STUDENT IN AN ORGANIZED HEALTH CARE EDUCATION/TRAINING PROGRAM

## 2023-11-22 PROCEDURE — 81003 URINALYSIS AUTO W/O SCOPE: CPT | Performed by: STUDENT IN AN ORGANIZED HEALTH CARE EDUCATION/TRAINING PROGRAM

## 2023-11-22 PROCEDURE — 86923 COMPATIBILITY TEST ELECTRIC: CPT

## 2023-11-22 PROCEDURE — 99285 EMERGENCY DEPT VISIT HI MDM: CPT

## 2023-11-22 PROCEDURE — 85045 AUTOMATED RETICULOCYTE COUNT: CPT | Performed by: HOSPITALIST

## 2023-11-22 PROCEDURE — 71045 X-RAY EXAM CHEST 1 VIEW: CPT

## 2023-11-22 PROCEDURE — 87070 CULTURE OTHR SPECIMN AEROBIC: CPT | Performed by: NURSE PRACTITIONER

## 2023-11-22 PROCEDURE — 84484 ASSAY OF TROPONIN QUANT: CPT | Performed by: STUDENT IN AN ORGANIZED HEALTH CARE EDUCATION/TRAINING PROGRAM

## 2023-11-22 PROCEDURE — 93005 ELECTROCARDIOGRAM TRACING: CPT | Performed by: STUDENT IN AN ORGANIZED HEALTH CARE EDUCATION/TRAINING PROGRAM

## 2023-11-22 PROCEDURE — 87077 CULTURE AEROBIC IDENTIFY: CPT | Performed by: NURSE PRACTITIONER

## 2023-11-22 PROCEDURE — 87147 CULTURE TYPE IMMUNOLOGIC: CPT | Performed by: NURSE PRACTITIONER

## 2023-11-22 PROCEDURE — 82728 ASSAY OF FERRITIN: CPT | Performed by: HOSPITALIST

## 2023-11-22 PROCEDURE — 83540 ASSAY OF IRON: CPT | Performed by: HOSPITALIST

## 2023-11-22 PROCEDURE — 85025 COMPLETE CBC W/AUTO DIFF WBC: CPT | Performed by: STUDENT IN AN ORGANIZED HEALTH CARE EDUCATION/TRAINING PROGRAM

## 2023-11-22 PROCEDURE — 87186 SC STD MICRODIL/AGAR DIL: CPT | Performed by: NURSE PRACTITIONER

## 2023-11-22 PROCEDURE — 87205 SMEAR GRAM STAIN: CPT | Performed by: NURSE PRACTITIONER

## 2023-11-22 PROCEDURE — 25810000003 SODIUM CHLORIDE 0.9 % SOLUTION: Performed by: STUDENT IN AN ORGANIZED HEALTH CARE EDUCATION/TRAINING PROGRAM

## 2023-11-22 PROCEDURE — 86901 BLOOD TYPING SEROLOGIC RH(D): CPT | Performed by: STUDENT IN AN ORGANIZED HEALTH CARE EDUCATION/TRAINING PROGRAM

## 2023-11-22 PROCEDURE — 0202U NFCT DS 22 TRGT SARS-COV-2: CPT | Performed by: STUDENT IN AN ORGANIZED HEALTH CARE EDUCATION/TRAINING PROGRAM

## 2023-11-22 PROCEDURE — 80053 COMPREHEN METABOLIC PANEL: CPT | Performed by: STUDENT IN AN ORGANIZED HEALTH CARE EDUCATION/TRAINING PROGRAM

## 2023-11-22 PROCEDURE — 93010 ELECTROCARDIOGRAM REPORT: CPT | Performed by: INTERNAL MEDICINE

## 2023-11-22 PROCEDURE — 84466 ASSAY OF TRANSFERRIN: CPT | Performed by: HOSPITALIST

## 2023-11-22 PROCEDURE — 84145 PROCALCITONIN (PCT): CPT | Performed by: NURSE PRACTITIONER

## 2023-11-22 PROCEDURE — 85007 BL SMEAR W/DIFF WBC COUNT: CPT | Performed by: STUDENT IN AN ORGANIZED HEALTH CARE EDUCATION/TRAINING PROGRAM

## 2023-11-22 PROCEDURE — 86850 RBC ANTIBODY SCREEN: CPT | Performed by: STUDENT IN AN ORGANIZED HEALTH CARE EDUCATION/TRAINING PROGRAM

## 2023-11-22 PROCEDURE — 87040 BLOOD CULTURE FOR BACTERIA: CPT | Performed by: NURSE PRACTITIONER

## 2023-11-22 RX ORDER — SUCRALFATE 1 G/1
1 TABLET ORAL
Status: DISCONTINUED | OUTPATIENT
Start: 2023-11-22 | End: 2023-11-22

## 2023-11-22 RX ORDER — ACETAMINOPHEN 160 MG/5ML
650 SOLUTION ORAL EVERY 4 HOURS PRN
Status: DISCONTINUED | OUTPATIENT
Start: 2023-11-22 | End: 2023-11-23

## 2023-11-22 RX ORDER — NITROGLYCERIN 0.4 MG/1
0.4 TABLET SUBLINGUAL
Status: DISCONTINUED | OUTPATIENT
Start: 2023-11-22 | End: 2023-11-29 | Stop reason: HOSPADM

## 2023-11-22 RX ORDER — BISACODYL 10 MG
10 SUPPOSITORY, RECTAL RECTAL DAILY PRN
Status: DISCONTINUED | OUTPATIENT
Start: 2023-11-22 | End: 2023-11-23

## 2023-11-22 RX ORDER — ACETAMINOPHEN 325 MG/1
650 TABLET ORAL EVERY 4 HOURS PRN
Status: DISCONTINUED | OUTPATIENT
Start: 2023-11-22 | End: 2023-11-23

## 2023-11-22 RX ORDER — SODIUM CHLORIDE 0.9 % (FLUSH) 0.9 %
10 SYRINGE (ML) INJECTION EVERY 12 HOURS SCHEDULED
Status: DISCONTINUED | OUTPATIENT
Start: 2023-11-22 | End: 2023-11-22

## 2023-11-22 RX ORDER — SODIUM CHLORIDE 9 MG/ML
40 INJECTION, SOLUTION INTRAVENOUS AS NEEDED
Status: DISCONTINUED | OUTPATIENT
Start: 2023-11-22 | End: 2023-11-22

## 2023-11-22 RX ORDER — POLYETHYLENE GLYCOL 3350 17 G/17G
17 POWDER, FOR SOLUTION ORAL DAILY PRN
Status: DISCONTINUED | OUTPATIENT
Start: 2023-11-22 | End: 2023-11-23

## 2023-11-22 RX ORDER — BISACODYL 5 MG/1
5 TABLET, DELAYED RELEASE ORAL DAILY PRN
Status: DISCONTINUED | OUTPATIENT
Start: 2023-11-22 | End: 2023-11-23

## 2023-11-22 RX ORDER — IPRATROPIUM BROMIDE AND ALBUTEROL SULFATE 2.5; .5 MG/3ML; MG/3ML
3 SOLUTION RESPIRATORY (INHALATION) EVERY 6 HOURS PRN
Status: DISCONTINUED | OUTPATIENT
Start: 2023-11-22 | End: 2023-11-22 | Stop reason: SDUPTHER

## 2023-11-22 RX ORDER — IPRATROPIUM BROMIDE AND ALBUTEROL SULFATE 2.5; .5 MG/3ML; MG/3ML
3 SOLUTION RESPIRATORY (INHALATION) EVERY 4 HOURS PRN
Status: DISCONTINUED | OUTPATIENT
Start: 2023-11-22 | End: 2023-11-29 | Stop reason: HOSPADM

## 2023-11-22 RX ORDER — ONDANSETRON 2 MG/ML
4 INJECTION INTRAMUSCULAR; INTRAVENOUS EVERY 6 HOURS PRN
Status: DISCONTINUED | OUTPATIENT
Start: 2023-11-22 | End: 2023-11-23

## 2023-11-22 RX ORDER — SUCRALFATE 1 G/1
1 TABLET ORAL
Status: DISCONTINUED | OUTPATIENT
Start: 2023-11-22 | End: 2023-11-23

## 2023-11-22 RX ORDER — ACETAMINOPHEN 650 MG/1
650 SUPPOSITORY RECTAL EVERY 4 HOURS PRN
Status: DISCONTINUED | OUTPATIENT
Start: 2023-11-22 | End: 2023-11-23

## 2023-11-22 RX ORDER — AMOXICILLIN 250 MG
2 CAPSULE ORAL 2 TIMES DAILY PRN
Status: DISCONTINUED | OUTPATIENT
Start: 2023-11-22 | End: 2023-11-23

## 2023-11-22 RX ORDER — SODIUM CHLORIDE 0.9 % (FLUSH) 0.9 %
10 SYRINGE (ML) INJECTION AS NEEDED
Status: DISCONTINUED | OUTPATIENT
Start: 2023-11-22 | End: 2023-11-22

## 2023-11-22 RX ORDER — AMLODIPINE BESYLATE 5 MG/1
5 TABLET ORAL DAILY
Status: DISCONTINUED | OUTPATIENT
Start: 2023-11-22 | End: 2023-11-23

## 2023-11-22 RX ORDER — BISACODYL 10 MG
10 SUPPOSITORY, RECTAL RECTAL DAILY PRN
Status: DISCONTINUED | OUTPATIENT
Start: 2023-11-22 | End: 2023-11-22

## 2023-11-22 RX ORDER — AMOXICILLIN 250 MG
2 CAPSULE ORAL 2 TIMES DAILY
Status: DISCONTINUED | OUTPATIENT
Start: 2023-11-22 | End: 2023-11-22

## 2023-11-22 RX ORDER — ROSUVASTATIN CALCIUM 20 MG/1
20 TABLET, COATED ORAL DAILY
Status: DISCONTINUED | OUTPATIENT
Start: 2023-11-22 | End: 2023-11-23

## 2023-11-22 RX ORDER — ONDANSETRON 4 MG/1
4 TABLET, FILM COATED ORAL EVERY 6 HOURS PRN
Status: DISCONTINUED | OUTPATIENT
Start: 2023-11-22 | End: 2023-11-23

## 2023-11-22 RX ORDER — AMITRIPTYLINE HYDROCHLORIDE 50 MG/1
50 TABLET, FILM COATED ORAL NIGHTLY
Status: DISCONTINUED | OUTPATIENT
Start: 2023-11-22 | End: 2023-11-23

## 2023-11-22 RX ORDER — BISACODYL 5 MG/1
5 TABLET, DELAYED RELEASE ORAL DAILY PRN
Status: DISCONTINUED | OUTPATIENT
Start: 2023-11-22 | End: 2023-11-22

## 2023-11-22 RX ORDER — POLYETHYLENE GLYCOL 3350 17 G/17G
17 POWDER, FOR SOLUTION ORAL DAILY PRN
Status: DISCONTINUED | OUTPATIENT
Start: 2023-11-22 | End: 2023-11-22

## 2023-11-22 RX ADMIN — AMITRIPTYLINE HYDROCHLORIDE 50 MG: 50 TABLET, FILM COATED ORAL at 20:19

## 2023-11-22 RX ADMIN — ROSUVASTATIN CALCIUM 20 MG: 20 TABLET, FILM COATED ORAL at 20:19

## 2023-11-22 RX ADMIN — SODIUM CHLORIDE 1000 ML: 9 INJECTION, SOLUTION INTRAVENOUS at 13:15

## 2023-11-22 RX ADMIN — SUCRALFATE 1 G: 1 TABLET ORAL at 20:19

## 2023-11-22 RX ADMIN — AMLODIPINE BESYLATE 5 MG: 5 TABLET ORAL at 20:19

## 2023-11-22 NOTE — ED NOTES
Called [facility] [address] to give report to nurse receiving the pt. This RN talked to CLAUDIO Borrero and advised the nurse report is on the chart and if that RN had any questions on the pt to give this RN a call back.

## 2023-11-22 NOTE — DISCHARGE PLACEMENT REQUEST
"Papito Santoro (77 y.o. Male)       Date of Birth   1945    Social Security Number       Address   [address] [place] [address]    Home Phone   [phone]    MRN   [id number]       [facility]    Marital Status                               Admission Date   11/22/23    Admission Type   Emergency    Admitting Provider   Tadeo Handy MD    Attending Provider   Tadeo Handy MD    Department, Room/Bed   [address], [address]       Discharge Date       Discharge Disposition       Discharge Destination                                 Attending Provider: Tadeo Handy MD    Allergies: Atorvastatin, Penicillins    Isolation: None   Infection: None   Code Status: CPR    Ht: 172.7 cm (68\")   Wt: 73.9 kg (163 lb)    Admission Cmt: None   Principal Problem: Weakness [R53.1]                   Active Insurance as of 11/22/2023       Primary Coverage       Payor Plan Insurance Group Employer/Plan Group    MEDICARE MEDICARE A & B        Payor Plan Address Payor Plan Phone Number Payor Plan Fax Number Effective Dates    [address] [phone]  12/1/2009 - None Entered    [facility] [id number]         Subscriber Name Subscriber Birth Date Member ID       PAPITO SANTORO 1945 [id number]               Secondary Coverage       Payor Plan Insurance Group Employer/Plan Group    [employer] [id number]       Payor Plan Address Payor Plan Phone Number Payor Plan Fax Number Effective Dates    [address] [phone]  12/1/2009 - None Entered    [facility] [address]         Subscriber Name Subscriber Birth Date Member ID       PAPITO SANTORO 1945 [id number]                     Emergency Contacts        (Rel.) Home Phone Work Phone Mobile Phone    Brooke Santoro (HCS) (Spouse) [phone] -- [phone]                "

## 2023-11-22 NOTE — ED PROVIDER NOTES
EMERGENCY DEPARTMENT ENCOUNTER    Room Number:  12/12  PCP: Damian Sanchez MD  Historian: EMS, patient, family member      HPI:  Chief Complaint: Weakness    Context: Madhu Santoro is a 77 y.o. male who presents to the ED c/o weakness.  Patient had recent lengthy hospitalization with aspiration pneumonia and subsequently discharged to a nursing and rehabilitation facility.  Patient has had poor progress since being discharged there according to family members.  Staff report weakness and state they did baseline lab work which noted an increased WBC so sent patient for further evaluation.  Patient has no specific complaints outside of generalized weakness.            PAST MEDICAL HISTORY  Active Ambulatory Problems     Diagnosis Date Noted    Anxiety     Arthritis     Coronary artery disease due to lipid rich plaque     HLD (hyperlipidemia)     Benign essential hypertension     DDD (degenerative disc disease), lumbosacral     Cerebrovascular accident     Encounter for screening for cardiovascular disorders 11/20/2012    Gastroesophageal reflux disease 04/04/2016    Hyperlipidemia 04/04/2016    Stenosis of carotid artery 04/26/2017    Former smoker 04/26/2017    History of cardiac catheterization 12/16/2011    S/P ablation of atrial flutter 04/26/2017    Typical atrial flutter 04/26/2017    S/P CABG (coronary artery bypass graft) 04/26/2017    Adenomatous polyp of ascending colon 02/12/2019    Abdominal bloating 02/12/2019    Stroke 03/29/2019    PAD (peripheral artery disease) 03/29/2019    Acute cholecystitis 09/06/2019    Right upper quadrant abdominal pain 09/06/2019    Chronic pain of both hips 03/31/2021    Chronic bilateral low back pain without sciatica 03/31/2021    Sacroiliac joint dysfunction of both sides 03/31/2021    Encounter for long-term (current) use of high-risk medication 03/31/2021    Lumbar facet arthropathy 03/31/2021    Stroke-like symptoms 04/10/2021    CVA (cerebral vascular accident)  04/11/2021    Personal history of colonic polyps 07/23/2021    Arthralgia of multiple joints 09/28/2021    Iron deficiency 08/16/2022    Thrombocytosis 08/16/2022    Left ureteral stone 08/22/2022    Obstructive uropathy 08/22/2022    Chronic anticoagulation 08/22/2022    Facial skin lesion 08/22/2022    Iron deficiency anemia 09/23/2022    Polycythemia 01/18/2023    Neuropathy 02/08/2023    Weakness 02/19/2023    Pneumonia 02/19/2023    Close exposure to COVID-19 virus 02/19/2023    Polycythemia vera 04/17/2023    Chronic gout without tophus 07/09/2023    COVID-19 10/23/2023    Cytokine release syndrome, grade 2 11/09/2023     Resolved Ambulatory Problems     Diagnosis Date Noted    No Resolved Ambulatory Problems     Past Medical History:   Diagnosis Date    Atrial flutter     Mandel esophagus     CAD (coronary artery disease)     Carotid artery disease     Colonic polyp     Cyst of pancreas     GERD (gastroesophageal reflux disease)     H/O bone density study 2013    H/O complete eye exam 2014    History of kidney stone     Hypertension     Kidney stone     Lipid screening 05/31/2013    Low back pain     Screening for prostate cancer 07/07/2015    Skin cancer          PAST SURGICAL HISTORY  Past Surgical History:   Procedure Laterality Date    CARDIAC CATHETERIZATION N/A 04/10/2017    Procedure: Left Heart Cath;  Surgeon: Marjorie Healy MD;  [facility];  Service:     CARDIAC CATHETERIZATION N/A 04/10/2017    Procedure: Coronary angiography;  Surgeon: Marjorie Healy MD;  [facility];  Service:     CARDIAC CATHETERIZATION N/A 04/10/2017    Procedure: Left ventriculography;  Surgeon: Marjorie Healy MD;  [facility];  Service:     CARDIAC CATHETERIZATION  2011    CARDIAC ELECTROPHYSIOLOGY PROCEDURE N/A 04/18/2017    Procedure: Ablation atrial flutter;  Surgeon: Jose Antonio Gustafson MD;  [facility];   Service:     CAROTID ENDARTERECTOMY      CHOLECYSTECTOMY      CHOLECYSTECTOMY WITH INTRAOPERATIVE CHOLANGIOGRAM N/A 09/07/2019    Procedure: Laparoscopic cholecystectomy with intraoperative cholangiogram;  Surgeon: Gauri Travis MD;  Location: [facility] MAIN OR;  Service: General    COLONOSCOPY  01/06/2015    Diverticulosis, one TA    COLONOSCOPY N/A 02/14/2019    tics, NBIH, adenomatous polyp x 2    COLONOSCOPY N/A 11/05/2021    Procedure: COLONOSCOPY TO CECUM AND TERM. ILEUM WITH COLD POLYPECTOMIES;  Surgeon: Everton Abel MD;  [facility];  Service: Gastroenterology;  Laterality: N/A;  PRE OP - PERS H/O POLYPS  POST OP - COLON POLYPS,, DIVERTICULOSIS, HEMORRHOIDS    CORONARY ARTERY BYPASS GRAFT N/A 04/11/2017    Procedure: AR STERNOTOMY CORONARY ARTERY BYPASS GRAFT TIMES 3 USING LEFT INTERNAL MAMMARY ARTERY AND LEFT GREATER SAPHENOUS VEIN GRAFT PER ENDOSCOPIC VEIN HARVESTING AND PRP ;  Surgeon: Temo Cortez MD;  Location: [facility] MAIN OR;  Service:     ENDOSCOPY  01/06/2015    HH, Ervin's esophagus    ENDOSCOPY N/A 02/14/2019    Z line irregular, HH, Ervin's esophagus    ENDOSCOPY N/A 11/05/2021    Procedure: ESOPHAGOGASTRODUODENOSCOPY WITH BIOPSIES;  Surgeon: Everton Abel MD;  Location: [facility] ENDOSCOPY;  Service: Gastroenterology;  Laterality: N/A;  PRE OP - PERS H/O ERVIN'S  POST OP - IRREG Z LINE    ENDOSCOPY W/ PEG TUBE PLACEMENT N/A 11/6/2023    Procedure: ESOPHAGOGASTRODUODENOSCOPY WITH PERCUTANEOUS ENDOSCOPIC GASTROSTOMY TUBE INSERTION;  Surgeon: Temo Ramsey MD;  Location: [facility] ENDOSCOPY;  Service: General;  Laterality: N/A;  Pre: dysphagia  Post: same    KNEE SURGERY Left     URETEROSCOPY LASER LITHOTRIPSY WITH STENT INSERTION Left 8/23/2022    Procedure: Cysto retrograde with left uretro stent placement;  Surgeon: Damien Oliveira MD;  Location: [facility] MAIN OR;  Service: Urology;  Laterality: Left;    VASECTOMY           FAMILY HISTORY  Family  History   Problem Relation Age of Onset    Hypertension Mother     Heart disease Mother     Heart attack Mother     Stroke Mother     Heart disease Father     Heart attack Father     Stroke Father     Hypertension Sister     Heart attack Brother     Heart disease Brother     No Known Problems Brother     Heart disease Brother     Heart attack Brother     Diabetes Brother     No Known Problems Maternal Grandmother     No Known Problems Maternal Grandfather     No Known Problems Paternal Grandmother     No Known Problems Paternal Grandfather     Pancreatic cancer Paternal Cousin     Arthritis Other     Diabetes Other     Hypertension Other     Kidney disease Other         stones    Malig Hyperthermia Neg Hx          SOCIAL HISTORY  Social History     Socioeconomic History    Marital status:      Spouse name: Brooke    Years of education: 9th grade   Tobacco Use    Smoking status: Former     Packs/day: 1     Types: Cigarettes     Start date: 1959     Quit date: 2009     Years since quittin.8    Smokeless tobacco: Never    Tobacco comments:     CAFFEINE USE   Vaping Use    Vaping Use: Never used   Substance and Sexual Activity    Alcohol use: Not Currently     Comment: rarely    Drug use: No    Sexual activity: Defer     Partners: Female         ALLERGIES  Atorvastatin and Penicillins        REVIEW OF SYSTEMS  Review of Systems   Neurological:  Positive for weakness.   All other systems reviewed and are negative.           PHYSICAL EXAM  ED Triage Vitals [23 0933]   Temp Heart Rate Resp BP SpO2   98.8 °F (37.1 °C) 80 18 134/76 97 %      Temp src Heart Rate Source Patient Position BP Location FiO2 (%)   Tympanic Monitor Lying Right arm --       Physical Exam      GENERAL: no acute distress, ill-appearing  HENT: nares patent  EYES: no scleral icterus  CV: regular rhythm, normal rate  RESPIRATORY: normal effort, on supplementary O2  ABDOMEN: soft  MUSCULOSKELETAL: no deformity  NEURO: alert, moves  all extremities, follows commands  PSYCH:  calm, cooperative  SKIN: warm, dry    Vital signs and nursing notes reviewed.          LAB RESULTS  Recent Results (from the past 24 hour(s))   Comprehensive Metabolic Panel    Collection Time: 11/22/23  9:55 AM    Specimen: Blood   Result Value Ref Range    Glucose 122 (H) 65 - 99 mg/dL    BUN 18 8 - 23 mg/dL    Creatinine 0.69 (L) 0.76 - 1.27 mg/dL    Sodium 135 (L) 136 - 145 mmol/L    Potassium 3.3 (L) 3.5 - 5.2 mmol/L    Chloride 95 (L) 98 - 107 mmol/L    CO2 32.5 (H) 22.0 - 29.0 mmol/L    Calcium 8.3 (L) 8.6 - 10.5 mg/dL    Total Protein 5.8 (L) 6.0 - 8.5 g/dL    Albumin 2.5 (L) 3.5 - 5.2 g/dL    ALT (SGPT) 66 (H) 1 - 41 U/L    AST (SGOT) 69 (H) 1 - 40 U/L    Alkaline Phosphatase 269 (H) 39 - 117 U/L    Total Bilirubin 0.7 0.0 - 1.2 mg/dL    Globulin 3.3 gm/dL    A/G Ratio 0.8 g/dL    BUN/Creatinine Ratio 26.1 (H) 7.0 - 25.0    Anion Gap 7.5 5.0 - 15.0 mmol/L    eGFR 94.7 >60.0 mL/min/1.73   CBC Auto Differential    Collection Time: 11/22/23  9:55 AM    Specimen: Blood   Result Value Ref Range    WBC 15.56 (H) 3.40 - 10.80 10*3/mm3    RBC 2.08 (L) 4.14 - 5.80 10*6/mm3    Hemoglobin 7.3 (L) 13.0 - 17.7 g/dL    Hematocrit 23.0 (L) 37.5 - 51.0 %    .6 (H) 79.0 - 97.0 fL    MCH 35.1 (H) 26.6 - 33.0 pg    MCHC 31.7 31.5 - 35.7 g/dL    RDW 15.4 12.3 - 15.4 %    RDW-SD 61.1 (H) 37.0 - 54.0 fl    MPV 10.2 6.0 - 12.0 fL    Platelets 456 (H) 140 - 450 10*3/mm3   Single High Sensitivity Troponin T    Collection Time: 11/22/23  9:55 AM    Specimen: Blood   Result Value Ref Range    HS Troponin T 46 (H) <22 ng/L   Type & Screen    Collection Time: 11/22/23  9:55 AM    Specimen: Blood   Result Value Ref Range    ABO Type O     RH type Negative     Antibody Screen Negative     T&S Expiration Date 11/25/2023 11:59:59 PM    Manual Differential    Collection Time: 11/22/23  9:55 AM    Specimen: Blood   Result Value Ref Range    Neutrophil % 81.0 (H) 42.7 - 76.0 %    Lymphocyte %  3.0 (L) 19.6 - 45.3 %    Monocyte % 6.0 5.0 - 12.0 %    Other Cells % 10.0 (H) 0.0 - 0.0 %    Neutrophils Absolute 12.60 (H) 1.70 - 7.00 10*3/mm3    Lymphocytes Absolute 0.47 (L) 0.70 - 3.10 10*3/mm3    Monocytes Absolute 0.93 (H) 0.10 - 0.90 10*3/mm3    Hypochromia Mod/2+ None Seen    Polychromasia Mod/2+ None Seen    Rouleaux Slight/1+ None Seen    Stomatocytes Slight/1+ None Seen    Smudge Cells Slight/1+ None Seen    Platelet Morphology Normal Normal   Manual Differential    Collection Time: 11/22/23  9:55 AM    Specimen: Blood   Result Value Ref Range    Neutrophil % 65.0 42.7 - 76.0 %    Lymphocyte % 16.0 (L) 19.6 - 45.3 %    Monocyte % 9.0 5.0 - 12.0 %    Metamyelocyte % 3.0 (H) 0.0 - 0.0 %    Myelocyte % 2.0 (H) 0.0 - 0.0 %    Promyelocyte % 2.0 (H) 0.0 - 0.0 %    Atypical Lymphocyte % 3.0 0.0 - 5.0 %    Neutrophils Absolute 10.11 (H) 1.70 - 7.00 10*3/mm3    Lymphocytes Absolute 2.96 0.70 - 3.10 10*3/mm3    Monocytes Absolute 1.40 (H) 0.10 - 0.90 10*3/mm3    Hypochromia Mod/2+ None Seen    Polychromasia Mod/2+ None Seen    WBC Morphology Normal Normal    Platelet Morphology Normal Normal   ECG 12 Lead Other; weakness    Collection Time: 11/22/23 10:05 AM   Result Value Ref Range    QT Interval 372 ms    QTC Interval 463 ms       Ordered the above labs and reviewed the results.        RADIOLOGY  XR Chest 1 View    Result Date: 11/22/2023  XR CHEST 1 VW-  HISTORY: Male who is 77 years-old, infection  TECHNIQUE: Frontal view of the chest  COMPARISON: 11/8/2023  FINDINGS: The heart size is normal. Sternotomy wires are present. Aorta is calcified. Pulmonary vasculature is unremarkable. Old granulomatous disease is present. Small likely scarring or atelectasis is seen in both lungs. No large pleural effusion. No pneumothorax. No acute osseous process.      As described.    This report was finalized on 11/22/2023 10:30 AM by Dr. Mukesh Bruce M.D on Workstation: [email or web address]       Ordered the above noted  radiological studies. Reviewed by me in PACS.            MEDICATIONS GIVEN IN ER  Medications   sodium chloride 0.9 % bolus 1,000 mL (1,000 mL Intravenous New Bag 11/22/23 1315)                   MEDICAL DECISION MAKING, PROGRESS, and CONSULTS    All labs have been independently reviewed by me.  All radiology studies have been reviewed by me and I have also reviewed the radiology report.   EKG's independently viewed and interpreted by me.  Discussion below represents my analysis of pertinent findings related to patient's condition, differential diagnosis, treatment plan and final disposition.      Additional sources:  - Discussed/ obtained information from independent historians: EMS, family member at bedside    - External (non-ED) record review: Discharge summary from 11/9/2023 reviewed and notable for admission secondary to a fall.  Patient was found to have significant generalized weakness and COVID-19 infection.  Patient had a complicated hospitalization including aspiration pneumonia.  Patient initially improved and able to be discharged after approximately 17 days    - Chronic or social conditions impacting care: Chronic deconditioning, nursing facility resident    - Shared decision making: Utilizing shared decision-making techniques we elected to proceed with inpatient care at this time      Orders placed during this visit:  Orders Placed This Encounter   Procedures    Respiratory Panel PCR w/COVID-19(SARS-CoV-2) DONTRELL/LESTER/KANCHAN/PAD/COR/BRIAN In-House, NP Swab in UTM/VTM, 2 HR TAT - Swab, Nasopharynx    XR Chest 1 View    Comprehensive Metabolic Panel    CBC Auto Differential    Urinalysis With Microscopic If Indicated (No Culture) - Urine, Clean Catch    Single High Sensitivity Troponin T    Manual Differential    Manual Differential    Single High Sensitivity Troponin T    [facility]HA (on-call MD unless specified) Details    ECG 12 Lead Other; weakness    Type & Screen    Initiate Observation Status    CBC & Differential          Differential diagnosis includes but is not limited to:    Weakness, anemia, infection      Independent interpretation of labs, radiology studies, and discussions with consultants:  ED Course as of 11/22/23 1342   Wed Nov 22, 2023   1050 WBC(!): 15.56 [MW]   1050 Hemoglobin(!): 7.3 [MW]   1050 HS Troponin T(!): 46 [MW]   1050 Potassium(!): 3.3 [MW]   1050 CO2(!): 32.5 [MW]   1051 ALT (SGPT)(!): 66 [MW]   1051 AST (SGOT)(!): 69 [MW]   1051 Alkaline Phosphatase(!): 269 [MW]   1051 Chest x-ray interpreted by me and demonstrates no evidence of infiltrate or consolidation [MW]   1341 EKG interpreted by me demonstrates sinus rhythm, rate of 93, no ME/QT prolongation, no ST elevation [MW]   1341 Patient has multiple laboratory abnormalities including elevated troponin.  Patient additionally noted to be hypoxic necessitating 2 L O2 supplementation.  We will admit for further evaluation and management. [MW]   1341 Discussed patient care with MY Dunbar with A who agrees to admit for further evaluation and management [MW]      ED Course User Index  [MW] Alexander Puga MD       DIAGNOSIS  Final diagnoses:   Weakness   Elevated troponin   Hypoxia         DISPOSITION  ED Disposition       ED Disposition   Decision to Admit    Condition   --    Comment   Level of Care: Telemetry [5]   Diagnosis: Weakness [938865]   Admitting Physician: OH ALFREDO [[id number]]   Attending Physician: OH ALFREDO [[id number]]                           Latest Documented Vital Signs:  As of 13:42 EST  BP- 134/76 HR- 80 Temp- 98.8 °F (37.1 °C) (Tympanic) O2 sat- 97%              --    Please note that portions of this were completed with a voice recognition program.       Note Disclaimer: At [facility], we believe that sharing information builds trust and better relationships. You are receiving this note because you are receiving care at [facility] or recently visited. It is possible you will see health information before a  provider has talked with you about it. This kind of information can be easy to misunderstand. To help you fully understand what it means for your health, we urge you to discuss this note with your provider.             Alexander Puga MD  11/22/23 1758

## 2023-11-22 NOTE — ED NOTES
Pt to ed from [facility] via EMS    Facility reports pts WBC is elevated at 17 and pt had temp of 101.2. EMS reports temp with them was 98.8 oral. Pt doesn't have any complaints.

## 2023-11-22 NOTE — PLAN OF CARE
Problem: Adult Inpatient Plan of Care  Goal: Plan of Care Review  Outcome: Ongoing, Not Progressing   Goal Outcome Evaluation:   Admission from [facility]. A&Ox3, disoriented to situation. Aphasia at baseline per wife. Denies CP or SOB. NSR on monitor, on 2L NC. Incontinent of B&B, pure wick placed.   S2 PI to coccyx, Wound cx sent, mepi placed; Excessive meatal discharge/ yeast build up around penis, cleansed w/ soap & water to best of abilities w/o damaging the skin. Wound consult placed.   NPO. PEG tube clamped. Nutrition c/s.  Urine sample & occult stool sample still needed.   Wife updated at bedside and updated on POC.

## 2023-11-22 NOTE — ED NOTES
"Nursing report ED to floor  Madhu Santoro  77 y.o.  male    HPI :   Chief Complaint   Patient presents with    Abnormal Lab       Admitting doctor:   Tadeo Handy MD    Admitting diagnosis:   The primary encounter diagnosis was Weakness. Diagnoses of Elevated troponin and Hypoxia were also pertinent to this visit.    Code status:   Current Code Status       Date Active Code Status Order ID Comments User Context       Prior            Allergies:   Atorvastatin and Penicillins    Isolation:   Enhanced Droplet/Contact     Intake and Output  No intake or output data in the 24 hours ending 11/22/23 1400    Weight:       11/22/23  1002   Weight: 73.9 kg (163 lb)       Most recent vitals:   Vitals:    11/22/23 0933 11/22/23 1002   BP: 134/76    BP Location: Right arm    Patient Position: Lying    Pulse: 80    Resp: 18    Temp: 98.8 °F (37.1 °C)    TempSrc: Tympanic    SpO2: 97%    Weight:  73.9 kg (163 lb)   Height:  172.7 cm (68\")       Active LDAs/IV Access:   Lines, Drains & Airways       Active LDAs       Name Placement date Placement time Site Days    Peripheral IV 10/25/23 1404 Anterior;Right Forearm 10/25/23  1404  Forearm  28    Peripheral IV 11/22/23 0955 Right Antecubital 11/22/23  0955  Antecubital  less than 1    Gastrostomy/Enterostomy Percutaneous endoscopic gastrostomy (PEG) 20 Fr. RUQ 11/06/23  1550  RUQ  15    External Urinary Catheter 11/22/23  1135  --  less than 1                    Labs (abnormal labs have a star):   Labs Reviewed   COMPREHENSIVE METABOLIC PANEL - Abnormal; Notable for the following components:       Result Value    Glucose 122 (*)     Creatinine 0.69 (*)     Sodium 135 (*)     Potassium 3.3 (*)     Chloride 95 (*)     CO2 32.5 (*)     Calcium 8.3 (*)     Total Protein 5.8 (*)     Albumin 2.5 (*)     ALT (SGPT) 66 (*)     AST (SGOT) 69 (*)     Alkaline Phosphatase 269 (*)     BUN/Creatinine Ratio 26.1 (*)     All other components within normal limits    Narrative:     GFR Normal " >60  Chronic Kidney Disease <60  Kidney Failure <15    The GFR formula is only valid for adults with stable renal function between ages 18 and 70.   CBC WITH AUTO DIFFERENTIAL - Abnormal; Notable for the following components:    WBC 15.56 (*)     RBC 2.08 (*)     Hemoglobin 7.3 (*)     Hematocrit 23.0 (*)     .6 (*)     MCH 35.1 (*)     RDW-SD 61.1 (*)     Platelets 456 (*)     All other components within normal limits   SINGLE HSTROPONIN T - Abnormal; Notable for the following components:    HS Troponin T 46 (*)     All other components within normal limits    Narrative:     High Sensitive Troponin T Reference Range:  <14.0 ng/L- Negative Female for AMI  <22.0 ng/L- Negative Male for AMI  >=14 - Abnormal Female indicating possible myocardial injury.  >=22 - Abnormal Male indicating possible myocardial injury.   Clinicians would have to utilize clinical acumen, EKG, Troponin, and serial changes to determine if it is an Acute Myocardial Infarction or myocardial injury due to an underlying chronic condition.        MANUAL DIFFERENTIAL - Abnormal; Notable for the following components:    Neutrophil % 81.0 (*)     Lymphocyte % 3.0 (*)     Other Cells % 10.0 (*)     Neutrophils Absolute 12.60 (*)     Lymphocytes Absolute 0.47 (*)     Monocytes Absolute 0.93 (*)     All other components within normal limits   MANUAL DIFFERENTIAL - Abnormal; Notable for the following components:    Lymphocyte % 16.0 (*)     Metamyelocyte % 3.0 (*)     Myelocyte % 2.0 (*)     Promyelocyte % 2.0 (*)     Neutrophils Absolute 10.11 (*)     Monocytes Absolute 1.40 (*)     All other components within normal limits   SINGLE HSTROPONIN T - Abnormal; Notable for the following components:    HS Troponin T 49 (*)     All other components within normal limits    Narrative:     High Sensitive Troponin T Reference Range:  <14.0 ng/L- Negative Female for AMI  <22.0 ng/L- Negative Male for AMI  >=14 - Abnormal Female indicating possible myocardial  injury.  >=22 - Abnormal Male indicating possible myocardial injury.   Clinicians would have to utilize clinical acumen, EKG, Troponin, and serial changes to determine if it is an Acute Myocardial Infarction or myocardial injury due to an underlying chronic condition.        RESPIRATORY PANEL PCR W/ COVID-19 (SARS-COV-2), NP SWAB IN UTM/VTP, 2 HR TAT   URINALYSIS W/ MICROSCOPIC IF INDICATED (NO CULTURE)   TYPE AND SCREEN   CBC AND DIFFERENTIAL    Narrative:     The following orders were created for panel order CBC & Differential.  Procedure                               Abnormality         Status                     ---------                               -----------         ------                     CBC Auto Differential[[id number]]        Abnormal            Final result                 Please view results for these tests on the individual orders.       EKG:   ECG 12 Lead Other; weakness   Preliminary Result   HEART RATE= 93  bpm   RR Interval= 645  ms   WA Interval= 165  ms   P Horizontal Axis= -3  deg   P Front Axis= 83  deg   QRSD Interval= 106  ms   QT Interval= 372  ms   QTcB= 463  ms   QRS Axis= -27  deg   T Wave Axis= 70  deg   - ABNORMAL ECG -   Sinus rhythm   Probable left atrial enlargement   Abnormal R-wave progression, late transition   LVH with secondary repolarization abnormality   Borderline ST elevation, inferior leads   Baseline wander in lead(s) V4   Electronically Signed By:    Date and Time of Study: 2023-11-22 10:05:10          Meds given in ED:   Medications   sodium chloride 0.9 % bolus 1,000 mL (1,000 mL Intravenous New Bag 11/22/23 1315)       Imaging results:  XR Chest 1 View    Result Date: 11/22/2023  As described.    This report was finalized on 11/22/2023 10:30 AM by Dr. Mukesh Bruce M.D on Workstation: [email or web address]       Ambulatory status:   - assist     Social issues:   Social History     Socioeconomic History    Marital status:      Spouse name: Brooke    Years of  education: 9th grade   Tobacco Use    Smoking status: Former     Packs/day: 1     Types: Cigarettes     Start date: 1959     Quit date: 2009     Years since quittin.8    Smokeless tobacco: Never    Tobacco comments:     CAFFEINE USE   Vaping Use    Vaping Use: Never used   Substance and Sexual Activity    Alcohol use: Not Currently     Comment: rarely    Drug use: No    Sexual activity: Defer     Partners: Female       NIH Stroke Scale:       Jeane Shah RN  23 14:00 EST

## 2023-11-22 NOTE — H&P
Patient Name:  Madhu Santoro  YOB: 1945  MRN:  [id number]  Admit Date:  11/22/2023  Patient Care Team:  Damian Sanchez MD as PCP - General (Family Medicine)  Jr Temo Cortez MD as Surgeon (Cardiothoracic Surgery)  Netta Mayorga Jr., MD as Consulting Physician (Vascular Surgery)  Damian Sanchez MD as Referring Physician (Family Medicine)  Aquiles Lopez MD as Consulting Physician (Hematology and Oncology)  Christy Luther APRN as Nurse Practitioner (Nurse Practitioner)  Damian Sanchez MD as Referring Physician (Family Medicine)      Subjective   History Present Illness     Chief Complaint   Patient presents with    Abnormal Lab       Mr. Santoro is a 77 y.o. former smoker with a history of hypertension, hyperlipidemia, CAD status post CABG, Mandel's esophagus, atrial flutter, CVA who presents to [facility] ER with chief complaint of abnormal lab and admitted for generalized weakness with progressive anemia etiology.    Recent hospital admission October 2023 for generalized weakness with fall and underlying COVID-19 infection.  Dysphagia contributing to aspiration pneumonia with subsequent PEG tube placement on 11/6/2023.  Patient completed remdesivir for COVID-19.  Discharged to rehab facility with overall guarded prognosis and now returns with serum hemoglobin 7.3 and reports of hypoxia.    Troponin trend flat, serum potassium 3.3, minimal elevation liver function test, leukocytosis WBC count 15,000, serum hemoglobin 7.3 degrees from 8.8 on 11/9/2023.    Recommendation pending hospital course.  Details below.    History of Present Illness    Review of Systems   Reason unable to perform ROS: limited ROS due to lethargy.   Respiratory:  Negative for cough and shortness of breath.    Cardiovascular:  Negative for chest pain and leg swelling.   Gastrointestinal:  Negative for abdominal pain, constipation, diarrhea, nausea and vomiting.   Genitourinary:  Negative for difficulty  "urinating and dysuria.        Personal History     Past Medical History:   Diagnosis Date    Anxiety     Arthritis     Atrial flutter     Status post cavotricuspid isthmus ablation by Dr. Gustafson on 4/18/17    Mandel esophagus     Benign essential hypertension     CAD (coronary artery disease)     3 vessel CABG 4/11/17 by Dr. Cortez: ROSARIO-prox LAD, SVG-OM1, SVG-OM3    Carotid artery disease     Status post carotid endarterectomy - [facility] 4/10/17: 50-59% NICHELLE, 1-15% LICA.     Colonic polyp     Cyst of pancreas     DDD (degenerative disc disease), lumbosacral     GERD (gastroesophageal reflux disease)     H/O bone density study 2013    H/O complete eye exam 2014    History of kidney stone     HLD (hyperlipidemia)     Hypertension     Kidney stone     8/22/22    Lipid screening 05/31/2013    Low back pain     [facility]    Screening for prostate cancer 07/07/2015    Skin cancer     nose    Stroke     RESIDUAL--\"BALANCE ISSUES\"     Past Surgical History:   Procedure Laterality Date    CARDIAC CATHETERIZATION N/A 04/10/2017    Procedure: Left Heart Cath;  Surgeon: Marjorie Healy MD;  Location: [facility] CATH INVASIVE LOCATION;  Service:     CARDIAC CATHETERIZATION N/A 04/10/2017    Procedure: Coronary angiography;  Surgeon: Marjorie Healy MD;  [facility];  Service:     CARDIAC CATHETERIZATION N/A 04/10/2017    Procedure: Left ventriculography;  Surgeon: Marjorie Healy MD;  [facility];  Service:     CARDIAC CATHETERIZATION  2011    CARDIAC ELECTROPHYSIOLOGY PROCEDURE N/A 04/18/2017    Procedure: Ablation atrial flutter;  Surgeon: Jose Antonio Gustafson MD;  [facility];  Service:     CAROTID ENDARTERECTOMY      CHOLECYSTECTOMY      CHOLECYSTECTOMY WITH INTRAOPERATIVE CHOLANGIOGRAM N/A 09/07/2019    Procedure: Laparoscopic cholecystectomy with intraoperative cholangiogram;  Surgeon: Gauri Travis MD;  Location: " [facility] MAIN OR;  Service: General    COLONOSCOPY  01/06/2015    Diverticulosis, one TA    COLONOSCOPY N/A 02/14/2019    tics, NBIH, adenomatous polyp x 2    COLONOSCOPY N/A 11/05/2021    Procedure: COLONOSCOPY TO CECUM AND TERM. ILEUM WITH COLD POLYPECTOMIES;  Surgeon: Everton Abel MD;  Location: [facility] ENDOSCOPY;  Service: Gastroenterology;  Laterality: N/A;  PRE OP - PERS H/O POLYPS  POST OP - COLON POLYPS,, DIVERTICULOSIS, HEMORRHOIDS    CORONARY ARTERY BYPASS GRAFT N/A 04/11/2017    Procedure: AR STERNOTOMY CORONARY ARTERY BYPASS GRAFT TIMES 3 USING LEFT INTERNAL MAMMARY ARTERY AND LEFT GREATER SAPHENOUS VEIN GRAFT PER ENDOSCOPIC VEIN HARVESTING AND PRP ;  Surgeon: Temo Cortez MD;  Location: [facility] MAIN OR;  Service:     ENDOSCOPY  01/06/2015    HH, Ervin's esophagus    ENDOSCOPY N/A 02/14/2019    Z line irregular, HH, Ervin's esophagus    ENDOSCOPY N/A 11/05/2021    Procedure: ESOPHAGOGASTRODUODENOSCOPY WITH BIOPSIES;  Surgeon: Everton Abel MD;  Location: [facility] ENDOSCOPY;  Service: Gastroenterology;  Laterality: N/A;  PRE OP - PERS H/O ERVIN'S  POST OP - IRREG Z LINE    ENDOSCOPY W/ PEG TUBE PLACEMENT N/A 11/6/2023    Procedure: ESOPHAGOGASTRODUODENOSCOPY WITH PERCUTANEOUS ENDOSCOPIC GASTROSTOMY TUBE INSERTION;  Surgeon: Temo Ramsey MD;  Location: [facility] ENDOSCOPY;  Service: General;  Laterality: N/A;  Pre: dysphagia  Post: same    KNEE SURGERY Left     URETEROSCOPY LASER LITHOTRIPSY WITH STENT INSERTION Left 8/23/2022    Procedure: Cysto retrograde with left uretro stent placement;  Surgeon: Damien Oliveira MD;  Location: [facility] MAIN OR;  Service: Urology;  Laterality: Left;    VASECTOMY       Family History   Problem Relation Age of Onset    Hypertension Mother     Heart disease Mother     Heart attack Mother     Stroke Mother     Heart disease Father     Heart attack Father     Stroke Father     Hypertension Sister     Heart attack Brother     Heart disease Brother      No Known Problems Brother     Heart disease Brother     Heart attack Brother     Diabetes Brother     No Known Problems Maternal Grandmother     No Known Problems Maternal Grandfather     No Known Problems Paternal Grandmother     No Known Problems Paternal Grandfather     Pancreatic cancer Paternal Cousin     Arthritis Other     Diabetes Other     Hypertension Other     Kidney disease Other         stones    Malig Hyperthermia Neg Hx      Social History     Tobacco Use    Smoking status: Former     Packs/day: 1     Types: Cigarettes     Start date: 1959     Quit date: 2009     Years since quittin.8    Smokeless tobacco: Never    Tobacco comments:     CAFFEINE USE   Vaping Use    Vaping Use: Never used   Substance Use Topics    Alcohol use: Not Currently     Comment: rarely    Drug use: No     No current facility-administered medications on file prior to encounter.     Current Outpatient Medications on File Prior to Encounter   Medication Sig Dispense Refill    amitriptyline (ELAVIL) 50 MG tablet TAKE ONE TABLET BY MOUTH ONCE NIGHTLY 30 tablet 3    amLODIPine (NORVASC) 5 MG tablet Take 1 tablet by mouth Daily.      apixaban (ELIQUIS) 5 MG tablet tablet Take 1 tablet by mouth 2 (Two) Times a Day. 90 tablet 3    furosemide (LASIX) 20 MG tablet TAKE ONE TABLET BY MOUTH DAILY 7 tablet 1    guaifenesin (ROBITUSSIN) 100 MG/5ML liquid Take 10 mL by mouth Every 4 (Four) Hours As Needed for Cough.      ipratropium-albuterol (DUO-NEB) 0.5-2.5 mg/3 ml nebulizer Take 3 mL by nebulization Every 4 (Four) Hours As Needed for Shortness of Air.      lansoprazole (PREVACID SOLUTAB) 30 MG Tablet Delayed Release Dispersible disintegrating tablet 1 tablet by Nasogastric route Every Morning Before Breakfast.      mupirocin (BACTROBAN) 2 % cream Apply 1 application topically to the appropriate area as directed 2 (Two) Times a Day. 15 g 0    naloxone (NARCAN) 4 MG/0.1ML nasal spray Call 911. Don't prime. Spray in 1 nostril  for overdose. Repeat in 2-3 minutes in other nostril if no or minimal breathing/responsiveness. 2 each 0    oxyBUTYnin (DITROPAN) 5 MG/5ML solution oral solution Administer 5 mL per G tube 2 (Two) Times a Day.      rosuvastatin (Crestor) 20 MG tablet Take 1 tablet by mouth Daily. 90 tablet 1    sucralfate (CARAFATE) 1 g tablet Take 1 tablet by mouth 2 (Two) Times a Day Before Meals.       Allergies   Allergen Reactions    Atorvastatin Myalgia     Myalgia    Penicillins Hives, Swelling and Rash     Tolerates cephalosporins        Objective    Objective     Vital Signs  Temp:  [98.8 °F (37.1 °C)] 98.8 °F (37.1 °C)  Heart Rate:  [80-89] 89  Resp:  [18] 18  BP: (103-134)/(65-76) 103/65  SpO2:  [97 %] 97 %  on  Flow (L/min):  [2] 2;   Device (Oxygen Therapy): nasal cannula  Body mass index is 24.78 kg/m².    Physical Exam  Constitutional:       General: He is not in acute distress.     Appearance: He is not toxic-appearing.      Comments: Generally weak, chronically ill, lethargic   HENT:      Head: Normocephalic and atraumatic.   Eyes:      Extraocular Movements: Extraocular movements intact.      Conjunctiva/sclera: Conjunctivae normal.   Cardiovascular:      Rate and Rhythm: Normal rate.      Heart sounds: Murmur heard.   Pulmonary:      Effort: Pulmonary effort is normal.      Comments: Diminished on expiration, anteriorly  Abdominal:      General: There is no distension.      Palpations: Abdomen is soft.      Tenderness: There is no abdominal tenderness. There is no guarding.      Comments: PEG   Musculoskeletal:      Cervical back: Normal range of motion and neck supple.      Right lower leg: No edema.      Left lower leg: No edema.   Skin:     General: Skin is warm and dry.   Neurological:      General: No focal deficit present.      Motor: Weakness (generalized) present.      Comments: Limited due to lethargy yet oriented to self & year; disoriented to location   Psychiatric:      Comments: Unable to ascertain  due to lethargy         Results Review:  I reviewed the patient's new clinical results.  I reviewed the patient's new imaging results and agree with the interpretation.  I reviewed the patient's other test results and agree with the interpretation  I personally viewed and interpreted the patient's EKG/Telemetry data  Discussed with ED provider.    Lab Results (last 24 hours)       Procedure Component Value Units Date/Time    CBC & Differential [[id number]]  (Abnormal) Collected: 11/22/23 0955    Specimen: Blood Updated: 11/22/23 1046    Narrative:      The following orders were created for panel order CBC & Differential.  Procedure                               Abnormality         Status                     ---------                               -----------         ------                     CBC Auto Differential[[id number]]        Abnormal            Final result                 Please view results for these tests on the individual orders.    Comprehensive Metabolic Panel [[id number]]  (Abnormal) Collected: 11/22/23 0955    Specimen: Blood Updated: 11/22/23 1030     Glucose 122 mg/dL      BUN 18 mg/dL      Creatinine 0.69 mg/dL      Sodium 135 mmol/L      Potassium 3.3 mmol/L      Chloride 95 mmol/L      CO2 32.5 mmol/L      Calcium 8.3 mg/dL      Total Protein 5.8 g/dL      Albumin 2.5 g/dL      ALT (SGPT) 66 U/L      AST (SGOT) 69 U/L      Alkaline Phosphatase 269 U/L      Total Bilirubin 0.7 mg/dL      Globulin 3.3 gm/dL      A/G Ratio 0.8 g/dL      BUN/Creatinine Ratio 26.1     Anion Gap 7.5 mmol/L      eGFR 94.7 mL/min/1.73     Narrative:      GFR Normal >60  Chronic Kidney Disease <60  Kidney Failure <15    The GFR formula is only valid for adults with stable renal function between ages 18 and 70.    CBC Auto Differential [[id number]]  (Abnormal) Collected: 11/22/23 0955    Specimen: Blood Updated: 11/22/23 1046     WBC 15.56 10*3/mm3      RBC 2.08 10*6/mm3      Hemoglobin 7.3 g/dL      Hematocrit 23.0 %       .6 fL      MCH 35.1 pg      MCHC 31.7 g/dL      RDW 15.4 %      RDW-SD 61.1 fl      MPV 10.2 fL      Platelets 456 10*3/mm3     Single High Sensitivity Troponin T [[id number]]  (Abnormal) Collected: 11/22/23 0955    Specimen: Blood Updated: 11/22/23 1030     HS Troponin T 46 ng/L     Narrative:      High Sensitive Troponin T Reference Range:  <14.0 ng/L- Negative Female for AMI  <22.0 ng/L- Negative Male for AMI  >=14 - Abnormal Female indicating possible myocardial injury.  >=22 - Abnormal Male indicating possible myocardial injury.   Clinicians would have to utilize clinical acumen, EKG, Troponin, and serial changes to determine if it is an Acute Myocardial Infarction or myocardial injury due to an underlying chronic condition.         Manual Differential [[id number]]  (Abnormal) Collected: 11/22/23 0955    Specimen: Blood Updated: 11/22/23 1046     Neutrophil % 81.0 %      Lymphocyte % 3.0 %      Monocyte % 6.0 %      Other Cells % 10.0 %      Neutrophils Absolute 12.60 10*3/mm3      Lymphocytes Absolute 0.47 10*3/mm3      Monocytes Absolute 0.93 10*3/mm3      Hypochromia Mod/2+     Polychromasia Mod/2+     Rouleaux Slight/1+     Stomatocytes Slight/1+     Smudge Cells Slight/1+     Platelet Morphology Normal    Manual Differential [[id number]]  (Abnormal) Collected: 11/22/23 0955    Specimen: Blood Updated: 11/22/23 1055     Neutrophil % 65.0 %      Lymphocyte % 16.0 %      Monocyte % 9.0 %      Metamyelocyte % 3.0 %      Myelocyte % 2.0 %      Promyelocyte % 2.0 %      Atypical Lymphocyte % 3.0 %      Neutrophils Absolute 10.11 10*3/mm3      Lymphocytes Absolute 2.96 10*3/mm3      Monocytes Absolute 1.40 10*3/mm3      Hypochromia Mod/2+     Polychromasia Mod/2+     WBC Morphology Normal     Platelet Morphology Normal    Single High Sensitivity Troponin T [[id number]]  (Abnormal) Collected: 11/22/23 1317    Specimen: Blood Updated: 11/22/23 1343     HS Troponin T 49 ng/L     Narrative:      High Sensitive  Troponin T Reference Range:  <14.0 ng/L- Negative Female for AMI  <22.0 ng/L- Negative Male for AMI  >=14 - Abnormal Female indicating possible myocardial injury.  >=22 - Abnormal Male indicating possible myocardial injury.   Clinicians would have to utilize clinical acumen, EKG, Troponin, and serial changes to determine if it is an Acute Myocardial Infarction or myocardial injury due to an underlying chronic condition.         Procalcitonin [[id number]] Collected: 11/22/23 1317    Specimen: Blood Updated: 11/22/23 1614    Respiratory Panel PCR w/COVID-19(SARS-CoV-2) DONTRELL/LESTER/KANCHAN/PAD/COR/BRIAN In-House, NP Swab in UTM/VTM, 2 HR TAT - Swab, Nasopharynx [[id number]]  (Normal) Collected: 11/22/23 1437    Specimen: Swab from Nasopharynx Updated: 11/22/23 1602     ADENOVIRUS, PCR Not Detected     Coronavirus 229E Not Detected     Coronavirus HKU1 Not Detected     Coronavirus NL63 Not Detected     Coronavirus OC43 Not Detected     COVID19 Not Detected     Human Metapneumovirus Not Detected     Human Rhinovirus/Enterovirus Not Detected     Influenza A PCR Not Detected     Influenza B PCR Not Detected     Parainfluenza Virus 1 Not Detected     Parainfluenza Virus 2 Not Detected     Parainfluenza Virus 3 Not Detected     Parainfluenza Virus 4 Not Detected     RSV, PCR Not Detected     Bordetella pertussis pcr Not Detected     Bordetella parapertussis PCR Not Detected     Chlamydophila pneumoniae PCR Not Detected     Mycoplasma pneumo by PCR Not Detected    Narrative:      In the setting of a positive respiratory panel with a viral infection PLUS a negative procalcitonin without other underlying concern for bacterial infection, consider observing off antibiotics or discontinuation of antibiotics and continue supportive care. If the respiratory panel is positive for atypical bacterial infection (Bordetella pertussis, Chlamydophila pneumoniae, or Mycoplasma pneumoniae), consider antibiotic de-escalation to target atypical bacterial  infection.            Imaging Results (Last 24 Hours)       Procedure Component Value Units Date/Time    XR Chest 1 View [[id number]] Collected: 11/22/23 1027     Updated: 11/22/23 1033    Narrative:      XR CHEST 1 VW-     HISTORY: Male who is 77 years-old, infection     TECHNIQUE: Frontal view of the chest     COMPARISON: 11/8/2023     FINDINGS: The heart size is normal. Sternotomy wires are present. Aorta  is calcified. Pulmonary vasculature is unremarkable. Old granulomatous  disease is present. Small likely scarring or atelectasis is seen in both  lungs. No large pleural effusion. No pneumothorax. No acute osseous  process.       Impression:      As described.           This report was finalized on 11/22/2023 10:30 AM by Dr. Mukesh Bruce M.D on Workstation: [employer]               Results for orders placed during the hospital encounter of 10/23/23    Adult Transthoracic Echo Complete W/ Cont if Necessary Per Protocol    Interpretation Summary    Left ventricular systolic function is normal. Calculated left ventricular EF = 62.8%    Left ventricular wall thickness is consistent with concentric remodelling.    Left ventricular diastolic function was normal.    Normal right ventricular size and function present.      ECG 12 Lead Other; weakness   Final Result   HEART RATE= 93  bpm   RR Interval= 645  ms   WI Interval= 165  ms   P Horizontal Axis= -3  deg   P Front Axis= 83  deg   QRSD Interval= 106  ms   QT Interval= 372  ms   QTcB= 463  ms   QRS Axis= -27  deg   T Wave Axis= 70  deg   - ABNORMAL ECG -   Sinus rhythm   Probable left atrial enlargement   Abnormal R-wave progression, late transition   LVH with secondary repolarization abnormality   PVCs have resolved   Electronically Signed By: Xena Lozano ([facility]) 22-Nov-2023 14:51:39   Date and Time of Study: 2023-11-22 10:05:10           Assessment/Plan     Active Hospital Problems    Diagnosis  POA    **Weakness [R53.1]  Yes    Anemia [D64.9]  Unknown     History of CVA (cerebrovascular accident) [Z86.73]  Not Applicable    S/P percutaneous endoscopic gastrostomy (PEG) tube placement [Z93.1]  Not Applicable    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       Mr. Santoro is a 77 y.o. former smoker with a history of hypertension, hyperlipidemia, CAD status post CABG, Mandel's esophagus, atrial flutter, CVA, polycythemia vera who presents to [facility] ER with chief complaint of abnormal lab and admitted for generalized weakness with progressive anemia etiology.      Weakness: Suspect multifactorial due to progressive anemia vs recent COVID-19 infection vs status post aspiration pneumonia vs remote history of CVA vs complications from sacral decubitus vs all vs other.  Falls precaution.  Therapies consulted.  Turn patient every 2 hours & as needed.      Leukocytosis: WBC 15,000.  CXR unremarkable.  Suspect due to sacral decubitus (consult wound nurse) present on admission vs other.  Ordering blood cultures x 2, procalcitonin, urinalysis, wound culture.  Monitor off empiric antibiotic therapy for now.      Elevated troponin: Troponin trend flat.  Does not appear with ACS.  Denies chest pain.  EKG reviewed--no evidence of ischemia.      Chronic anticoagulation in setting of atrial flutter s/p ablation / polycythemia: Hold anticoagulation now for acute anemia.      Benign essential hypertension: BP soft.  Monitor patient off antihypertensive agents for now.      S/P CABG (coronary artery bypass graft):  see above.  Plan to hold statin for mild transaminitis.      Anemia: Serial H&H.  Stool Hemoccult.      History of  CVA (cerebrovascular accident):  No evidence of lateralizing features on exam to suggest acute neurological event.      S/P percutaneous endoscopic gastrostomy (PEG) tube placement: Placed November 6, 2023.  Chronic gastritis on EGD.      Mandel esophagus /dysphagia:  See above.  Continue Carafate 1 g twice daily and Lansprazole.  Tube feeds per PEG tube. HOB >30 at all times.  Consult nutrition.    I discussed the patient's findings and my recommendations with patient and nursing staff, wife, & Dr. Handy.    VTE Prophylaxis - SCDs.  Code Status - Full code.       MY Ruiz  [facility]  11/22/23  16:17 EST

## 2023-11-23 LAB
ALBUMIN SERPL-MCNC: 2.4 G/DL (ref 3.5–5.2)
ALBUMIN/GLOB SERPL: 0.8 G/DL
ALP SERPL-CCNC: 207 U/L (ref 39–117)
ALT SERPL W P-5'-P-CCNC: 50 U/L (ref 1–41)
ANION GAP SERPL CALCULATED.3IONS-SCNC: 5.2 MMOL/L (ref 5–15)
ANION GAP SERPL CALCULATED.3IONS-SCNC: 7.3 MMOL/L (ref 5–15)
AST SERPL-CCNC: 51 U/L (ref 1–40)
BILIRUB SERPL-MCNC: 0.9 MG/DL (ref 0–1.2)
BUN SERPL-MCNC: 13 MG/DL (ref 8–23)
BUN SERPL-MCNC: 14 MG/DL (ref 8–23)
BUN/CREAT SERPL: 23.6 (ref 7–25)
BUN/CREAT SERPL: 24.1 (ref 7–25)
CALCIUM SPEC-SCNC: 7.9 MG/DL (ref 8.6–10.5)
CALCIUM SPEC-SCNC: 8.1 MG/DL (ref 8.6–10.5)
CHLORIDE SERPL-SCNC: 101 MMOL/L (ref 98–107)
CHLORIDE SERPL-SCNC: 102 MMOL/L (ref 98–107)
CO2 SERPL-SCNC: 29.7 MMOL/L (ref 22–29)
CO2 SERPL-SCNC: 29.8 MMOL/L (ref 22–29)
CREAT SERPL-MCNC: 0.55 MG/DL (ref 0.76–1.27)
CREAT SERPL-MCNC: 0.58 MG/DL (ref 0.76–1.27)
DEPRECATED RDW RBC AUTO: 57.1 FL (ref 37–54)
EGFRCR SERPLBLD CKD-EPI 2021: 101.4 ML/MIN/1.73
EGFRCR SERPLBLD CKD-EPI 2021: 99.8 ML/MIN/1.73
ERYTHROCYTE [DISTWIDTH] IN BLOOD BY AUTOMATED COUNT: 15 % (ref 12.3–15.4)
FOLATE SERPL-MCNC: >20 NG/ML (ref 4.78–24.2)
GLOBULIN UR ELPH-MCNC: 2.9 GM/DL
GLUCOSE SERPL-MCNC: 84 MG/DL (ref 65–99)
GLUCOSE SERPL-MCNC: 87 MG/DL (ref 65–99)
HCT VFR BLD AUTO: 21.8 % (ref 37.5–51)
HEMOCCULT STL QL: POSITIVE
HGB BLD-MCNC: 6.9 G/DL (ref 13–17.7)
MAGNESIUM SERPL-MCNC: 2.3 MG/DL (ref 1.6–2.4)
MCH RBC QN AUTO: 33.5 PG (ref 26.6–33)
MCHC RBC AUTO-ENTMCNC: 31.7 G/DL (ref 31.5–35.7)
MCV RBC AUTO: 105.8 FL (ref 79–97)
PLATELET # BLD AUTO: 418 10*3/MM3 (ref 140–450)
PMV BLD AUTO: 9.9 FL (ref 6–12)
POTASSIUM SERPL-SCNC: 3.1 MMOL/L (ref 3.5–5.2)
POTASSIUM SERPL-SCNC: 3.1 MMOL/L (ref 3.5–5.2)
POTASSIUM SERPL-SCNC: 4.9 MMOL/L (ref 3.5–5.2)
PROT SERPL-MCNC: 5.3 G/DL (ref 6–8.5)
RBC # BLD AUTO: 2.06 10*6/MM3 (ref 4.14–5.8)
SODIUM SERPL-SCNC: 136 MMOL/L (ref 136–145)
SODIUM SERPL-SCNC: 139 MMOL/L (ref 136–145)
VIT B12 BLD-MCNC: 1194 PG/ML (ref 211–946)
WBC NRBC COR # BLD AUTO: 9.05 10*3/MM3 (ref 3.4–10.8)

## 2023-11-23 PROCEDURE — 92610 EVALUATE SWALLOWING FUNCTION: CPT

## 2023-11-23 PROCEDURE — 82746 ASSAY OF FOLIC ACID SERUM: CPT | Performed by: HOSPITALIST

## 2023-11-23 PROCEDURE — 80053 COMPREHEN METABOLIC PANEL: CPT | Performed by: NURSE PRACTITIONER

## 2023-11-23 PROCEDURE — P9016 RBC LEUKOCYTES REDUCED: HCPCS

## 2023-11-23 PROCEDURE — 82272 OCCULT BLD FECES 1-3 TESTS: CPT | Performed by: NURSE PRACTITIONER

## 2023-11-23 PROCEDURE — 36415 COLL VENOUS BLD VENIPUNCTURE: CPT | Performed by: NURSE PRACTITIONER

## 2023-11-23 PROCEDURE — 84132 ASSAY OF SERUM POTASSIUM: CPT | Performed by: INTERNAL MEDICINE

## 2023-11-23 PROCEDURE — G0378 HOSPITAL OBSERVATION PER HR: HCPCS

## 2023-11-23 PROCEDURE — P9040 RBC LEUKOREDUCED IRRADIATED: HCPCS

## 2023-11-23 PROCEDURE — 85027 COMPLETE CBC AUTOMATED: CPT | Performed by: NURSE PRACTITIONER

## 2023-11-23 PROCEDURE — 83735 ASSAY OF MAGNESIUM: CPT | Performed by: INTERNAL MEDICINE

## 2023-11-23 PROCEDURE — 82607 VITAMIN B-12: CPT | Performed by: HOSPITALIST

## 2023-11-23 PROCEDURE — 36430 TRANSFUSION BLD/BLD COMPNT: CPT

## 2023-11-23 PROCEDURE — 86900 BLOOD TYPING SEROLOGIC ABO: CPT

## 2023-11-23 RX ORDER — ROSUVASTATIN CALCIUM 20 MG/1
20 TABLET, COATED ORAL DAILY
Status: DISCONTINUED | OUTPATIENT
Start: 2023-11-24 | End: 2023-11-23

## 2023-11-23 RX ORDER — ACETAMINOPHEN 160 MG/5ML
650 SOLUTION ORAL EVERY 4 HOURS PRN
Status: DISCONTINUED | OUTPATIENT
Start: 2023-11-23 | End: 2023-11-29 | Stop reason: HOSPADM

## 2023-11-23 RX ORDER — ACETAMINOPHEN 325 MG/1
650 TABLET ORAL EVERY 4 HOURS PRN
Status: DISCONTINUED | OUTPATIENT
Start: 2023-11-23 | End: 2023-11-29 | Stop reason: HOSPADM

## 2023-11-23 RX ORDER — ACETAMINOPHEN 650 MG/1
650 SUPPOSITORY RECTAL EVERY 4 HOURS PRN
Status: DISCONTINUED | OUTPATIENT
Start: 2023-11-23 | End: 2023-11-29 | Stop reason: HOSPADM

## 2023-11-23 RX ORDER — AMLODIPINE BESYLATE 5 MG/1
5 TABLET ORAL DAILY
Status: DISCONTINUED | OUTPATIENT
Start: 2023-11-24 | End: 2023-11-29 | Stop reason: HOSPADM

## 2023-11-23 RX ORDER — ONDANSETRON 2 MG/ML
4 INJECTION INTRAMUSCULAR; INTRAVENOUS EVERY 6 HOURS PRN
Status: DISCONTINUED | OUTPATIENT
Start: 2023-11-23 | End: 2023-11-29 | Stop reason: HOSPADM

## 2023-11-23 RX ORDER — HYDROXYZINE HYDROCHLORIDE 25 MG/1
25 TABLET, FILM COATED ORAL ONCE
Status: COMPLETED | OUTPATIENT
Start: 2023-11-23 | End: 2023-11-23

## 2023-11-23 RX ORDER — ONDANSETRON 4 MG/1
4 TABLET, FILM COATED ORAL EVERY 6 HOURS PRN
Status: DISCONTINUED | OUTPATIENT
Start: 2023-11-23 | End: 2023-11-29 | Stop reason: HOSPADM

## 2023-11-23 RX ORDER — BISACODYL 5 MG/1
5 TABLET, DELAYED RELEASE ORAL DAILY PRN
Status: DISCONTINUED | OUTPATIENT
Start: 2023-11-23 | End: 2023-11-29 | Stop reason: HOSPADM

## 2023-11-23 RX ORDER — BISACODYL 10 MG
10 SUPPOSITORY, RECTAL RECTAL DAILY PRN
Status: DISCONTINUED | OUTPATIENT
Start: 2023-11-23 | End: 2023-11-29 | Stop reason: HOSPADM

## 2023-11-23 RX ORDER — AMOXICILLIN 250 MG
2 CAPSULE ORAL 2 TIMES DAILY PRN
Status: DISCONTINUED | OUTPATIENT
Start: 2023-11-23 | End: 2023-11-29 | Stop reason: HOSPADM

## 2023-11-23 RX ORDER — POLYETHYLENE GLYCOL 3350 17 G/17G
17 POWDER, FOR SOLUTION ORAL DAILY PRN
Status: DISCONTINUED | OUTPATIENT
Start: 2023-11-23 | End: 2023-11-29 | Stop reason: HOSPADM

## 2023-11-23 RX ORDER — AMITRIPTYLINE HYDROCHLORIDE 50 MG/1
50 TABLET, FILM COATED ORAL NIGHTLY
Status: DISCONTINUED | OUTPATIENT
Start: 2023-11-23 | End: 2023-11-29 | Stop reason: HOSPADM

## 2023-11-23 RX ORDER — SUCRALFATE 1 G/1
1 TABLET ORAL
Status: DISCONTINUED | OUTPATIENT
Start: 2023-11-23 | End: 2023-11-29 | Stop reason: HOSPADM

## 2023-11-23 RX ORDER — POTASSIUM CHLORIDE 1.5 G/1.58G
40 POWDER, FOR SOLUTION ORAL EVERY 4 HOURS
Status: COMPLETED | OUTPATIENT
Start: 2023-11-23 | End: 2023-11-23

## 2023-11-23 RX ADMIN — SUCRALFATE 1 G: 1 TABLET ORAL at 16:57

## 2023-11-23 RX ADMIN — POTASSIUM CHLORIDE 40 MEQ: 1.5 FOR SOLUTION ORAL at 08:40

## 2023-11-23 RX ADMIN — HYDROXYZINE HYDROCHLORIDE 25 MG: 25 TABLET ORAL at 22:17

## 2023-11-23 RX ADMIN — POTASSIUM CHLORIDE 40 MEQ: 1.5 FOR SOLUTION ORAL at 16:58

## 2023-11-23 RX ADMIN — ROSUVASTATIN CALCIUM 20 MG: 20 TABLET, FILM COATED ORAL at 08:40

## 2023-11-23 RX ADMIN — ACETAMINOPHEN 650 MG: 160 SOLUTION ORAL at 12:38

## 2023-11-23 RX ADMIN — AMLODIPINE BESYLATE 5 MG: 5 TABLET ORAL at 08:40

## 2023-11-23 RX ADMIN — AMITRIPTYLINE HYDROCHLORIDE 50 MG: 50 TABLET, FILM COATED ORAL at 22:17

## 2023-11-23 RX ADMIN — SUCRALFATE 1 G: 1 TABLET ORAL at 07:31

## 2023-11-23 RX ADMIN — POTASSIUM CHLORIDE 40 MEQ: 1.5 FOR SOLUTION ORAL at 12:38

## 2023-11-23 RX ADMIN — BISACODYL 10 MG: 10 SUPPOSITORY RECTAL at 17:05

## 2023-11-23 RX ADMIN — POLYETHYLENE GLYCOL 3350 17 G: 17 POWDER, FOR SOLUTION ORAL at 16:58

## 2023-11-23 RX ADMIN — LANSOPRAZOLE 30 MG: 15 TABLET, ORALLY DISINTEGRATING ORAL at 07:31

## 2023-11-23 NOTE — CONSULTS
"Nutrition Services    Patient Name:  Madhu Santoro  YOB: 1945  MRN: [id number]  Admit Date:  11/22/2023    .Assessment Date:  11/23/23    CLINICAL NUTRITION ASSESSMENT    Comments: Consult for tube feeding assessment    Pt recently at [facility]. PEG in place for nutrition support. Will resume previous enteral formula, slight increase in goal rate as follows:    Diabetisource AC @ 75 ml/hr (goal) with 25 ml H20 flush hourly.     RD to follow tolerance, labs and clinical course.     Encounter Information         Reason for Encounter TF consult    Admitting Diagnosis Weakness, abnormal labs; recent COVID 19 infection    Pertinent Medical History CVA, Mandel esophagus, CABG    Current Issues Weakness, dysphagia     Current Nutrition Orders & Evaluation of Intake       Oral Nutrition     Food Allergies    Current PO Diet NPO Diet NPO Type: Strict NPO   Supplement    PO Evaluation      % PO Intake    --  Anthropometrics          Height    Weight Height: 172.7 cm (68\")  Weight: 73.9 kg (163 lb) (11/22/23 1002)    BMI kg/m2 Body mass index is 24.78 kg/m².    BMI Category Normal/Healthy (18.4 - 24.9)    Weight History  Wt Readings from Last 15 Encounters:   11/22/23 1002 73.9 kg (163 lb)   11/09/23 0640 77.7 kg (171 lb 4.8 oz)   11/08/23 0505 66.3 kg (146 lb 2.6 oz)   11/07/23 0647 65.4 kg (144 lb 2.9 oz)   11/06/23 0600 76 kg (167 lb 8.8 oz)   11/05/23 0700 73.7 kg (162 lb 7.7 oz)   11/04/23 0500 77.4 kg (170 lb 10.2 oz)   11/03/23 0500 65.4 kg (144 lb 2.9 oz)   11/02/23 1501 68.9 kg (152 lb)   11/02/23 0556 69.2 kg (152 lb 8.9 oz)   11/02/23 0554 80 kg (176 lb 5.9 oz)   11/01/23 0339 68.8 kg (151 lb 10.8 oz)   10/31/23 0128 67 kg (147 lb 11.3 oz)   10/29/23 0309 69.2 kg (152 lb 8.9 oz)   10/28/23 0454 69.7 kg (153 lb 10.6 oz)   10/27/23 0417 72.9 kg (160 lb 11.5 oz)   10/23/23 0536 78.8 kg (173 lb 12.8 oz)   10/20/23 1346 77.8 kg (171 lb 8 oz)   10/12/23 1148 79.3 kg (174 lb 12.8 oz)   10/03/23 1312 77.1 " kg (170 lb)   09/21/23 1142 77.7 kg (171 lb 3.2 oz)   08/14/23 1144 76.4 kg (168 lb 6.4 oz)   07/11/23 1131 76.7 kg (169 lb)   06/30/23 1312 74.7 kg (164 lb 11.2 oz)   06/15/23 1300 75 kg (165 lb 4.8 oz)   06/12/23 1105 74.6 kg (164 lb 6.4 oz)   06/01/23 1401 74.6 kg (164 lb 8 oz)   05/18/23 0801 77.1 kg (169 lb 14.4 oz)   05/15/23 1356 75.3 kg (166 lb 1.6 oz)   05/01/23 1446 76.9 kg (169 lb 9.6 oz)        Estimated/Assessed Needs        Energy Requirements    Weight for Calculation 73.9 kg   Method for Estimation  30-35 kcal/kg   EST Needs (kcal/day) 1549-2553        Protein Requirements    Weight for Calculation 73.9 kg   EST Protein Needs (g/kg) 1.2 - 1.5 gm/kg   EST Daily Needs (g/day)         Fluid Requirements     Method for Estimation 1 mL/kcal    Estimated Needs (mL/day)          Physical Findings          Physical Appearance disoriented   Oral/Mouth Cavity dry mouth   Edema  1+ (trace)   Gastrointestinal last bowel movement: 11/22   Skin  pressure injury: coccyx   Tubes/Drains/Lines PEG   NFPE Pending, due to: RD remote status     Labs        Pertinent Labs Reviewed, listed below     Results from last 7 days   Lab Units 11/23/23  0815 11/23/23  0615 11/22/23  0955   SODIUM mmol/L 136 139 135*   POTASSIUM mmol/L 3.1* 3.1* 3.3*   CHLORIDE mmol/L 101 102 95*   CO2 mmol/L 29.8* 29.7* 32.5*   BUN mg/dL 14 13 18   CREATININE mg/dL 0.58* 0.55* 0.69*   CALCIUM mg/dL 8.1* 7.9* 8.3*   BILIRUBIN mg/dL 0.9  --  0.7   ALK PHOS U/L 207*  --  269*   ALT (SGPT) U/L 50*  --  66*   AST (SGOT) U/L 51*  --  69*   GLUCOSE mg/dL 87 84 122*     Results from last 7 days   Lab Units 11/23/23  0815 11/23/23  0615   MAGNESIUM mg/dL 2.3  --    HEMOGLOBIN g/dL  --  6.9*   HEMATOCRIT %  --  21.8*   WBC 10*3/mm3  --  9.05   ALBUMIN g/dL 2.4*  --      Results from last 7 days   Lab Units 11/23/23  0615 11/22/23  0955   PLATELETS 10*3/mm3 418 456*     COVID19   Date Value Ref Range Status   11/22/2023 Not Detected Not Detected - Ref.  Range Final     Lab Results   Component Value Date    HGBA1C 5.50 11/04/2023          Medications            Scheduled Medications amitriptyline, 50 mg, Oral, Nightly  amLODIPine, 5 mg, Oral, Daily  lansoprazole, 30 mg, Nasogastric, QAM AC  potassium chloride, 40 mEq, Oral, Q4H  rosuvastatin, 20 mg, Oral, Daily  sucralfate, 1 g, Oral, BID AC        Infusions      PRN Medications   acetaminophen **OR** acetaminophen **OR** acetaminophen    senna-docusate sodium **AND** polyethylene glycol **AND** bisacodyl **AND** bisacodyl    Calcium Replacement - Follow Nurse / BPA Driven Protocol    ipratropium-albuterol    Magnesium Standard Dose Replacement - Follow Nurse / BPA Driven Protocol    nitroglycerin    ondansetron **OR** ondansetron    Phosphorus Replacement - Follow Nurse / BPA Driven Protocol    Potassium Replacement - Follow Nurse / BPA Driven Protocol     PES STATEMENT / NUTRITION DIAGNOSIS      Nutrition Dx Problem  Problem: Swallowing Difficulty  Etiology: Medical Diagnosis - stroke, dysphagia  Signs/Symptoms: NPO, Report/Observation, and SLP/Swallow Evaluation    Comment:      PLAN OF CARE  Intervention Goal        Intervention Goal(s) Initiate TF/PN     Nutrition Intervention         RD Action Recommend/order: TF     Nutrition Prescription          Recommendation       Enteral Prescription:     Enteral Route PEG    TF Delivery Method Continuous    Enteral Product Diabetisource AC    Modular None    Propofol Rate/Kcal     TF Start Rate  20 mL/hr    TF Goal Rate  75 mL/hr    Free Water Flush 25 mL Q 1 hr    TF Provision at Goal:          Calories 2160 kcal, meets 97 % needs         Protein  108 gm protein, meets 100 % needs         Fluid (mL) 1468 mL free water + 600 mL in flushes    Prescription Ordered Yes     Monitor/Evaluation        Monitor Per protocol     RD to follow up per protocol.    Electronically signed by:  Tarah Gutiérrez RD  11/23/23 11:46 EST    Electronically signed by:  Tarah Gutiérrez  RD  11/23/23 11:46 EST

## 2023-11-23 NOTE — PROGRESS NOTES
Name: Madhu Santoro ADMIT: 2023   : 1945  PCP: Damian Sanchez MD    MRN: [id number] LOS: 0 days   AGE/SEX: 77 y.o. male  ROOM: [facility]     Subjective   Subjective   Chief Complaint   Patient presents with    Abnormal Lab     Patient is alert and soft-spoken.  He is laying supine.  Denied dizziness in this position.  Reports fatigue.  No abdominal pain or nausea reported.  He is not reporting acute shortness of breath.  Discussed with family at bedside and she reports that he has not been able to work much with physical therapy at rehab and they are concerned about his wound.  She reports that he has been on amitriptyline due to neuropathy in an attempt to treat and allow for more work with physical therapy.  He also had been on Hydrea for polycythemia and follows with the [facility].  Discussed that currently he is anemic and plan is to give 1 unit of PRBC for hemoglobin of less than 7.     Objective   Objective   Vital Signs  Temp:  [97.3 °F (36.3 °C)-98.1 °F (36.7 °C)] 97.7 °F (36.5 °C)  Heart Rate:  [] 90  Resp:  [14-19] 14  BP: (103-138)/(57-70) 124/66  SpO2:  [85 %-100 %] 96 %  on  Flow (L/min):  [2-3] 3;   Device (Oxygen Therapy):  system  Body mass index is 24.78 kg/m².    Physical Exam  Vitals and nursing note reviewed.   Constitutional:       Appearance: He is ill-appearing. He is not diaphoretic.   Cardiovascular:      Rate and Rhythm: Normal rate and regular rhythm.      Pulses: Normal pulses.   Pulmonary:      Effort: Pulmonary effort is normal.      Breath sounds: Decreased breath sounds present. No wheezing.   Abdominal:      General: There is no distension.      Palpations: Abdomen is soft.   Musculoskeletal:         General: No swelling or tenderness.   Skin:     General: Skin is warm and dry.      Findings: Lesion present.   Neurological:      Mental Status: He is alert. Mental status is at baseline.   Psychiatric:         Mood and Affect: Mood normal.          "Behavior: Behavior is withdrawn.       Results Review  I reviewed the patient's new clinical results.    Results from last 7 days   Lab Units 11/23/23  0615 11/22/23  0955   WBC 10*3/mm3 9.05 15.56*   HEMOGLOBIN g/dL 6.9* 7.3*   PLATELETS 10*3/mm3 418 456*     Results from last 7 days   Lab Units 11/23/23  0815 11/23/23  0615 11/22/23  0955   SODIUM mmol/L 136 139 135*   POTASSIUM mmol/L 3.1* 3.1* 3.3*   CHLORIDE mmol/L 101 102 95*   CO2 mmol/L 29.8* 29.7* 32.5*   BUN mg/dL 14 13 18   CREATININE mg/dL 0.58* 0.55* 0.69*   GLUCOSE mg/dL 87 84 122*   EGFR mL/min/1.73 99.8 101.4 94.7     Results from last 7 days   Lab Units 11/23/23  0815 11/22/23  0955   ALBUMIN g/dL 2.4* 2.5*   BILIRUBIN mg/dL 0.9 0.7   ALK PHOS U/L 207* 269*   AST (SGOT) U/L 51* 69*   ALT (SGPT) U/L 50* 66*     Results from last 7 days   Lab Units 11/23/23  0815 11/23/23  0615 11/22/23  0955   CALCIUM mg/dL 8.1* 7.9* 8.3*   ALBUMIN g/dL 2.4*  --  2.5*   MAGNESIUM mg/dL 2.3  --   --      Results from last 7 days   Lab Units 11/22/23  1317   PROCALCITONIN ng/mL 0.67*     No results found for: \"HGBA1C\", \"POCGLU\"    XR Chest 1 View    Result Date: 11/22/2023  As described.    This report was finalized on 11/22/2023 10:30 AM by Dr. Mukesh Bruce M.D on Workstation: [id number]       I have personally reviewed all medications:  Scheduled Medications  amitriptyline, 50 mg, Oral, Nightly  amLODIPine, 5 mg, Oral, Daily  lansoprazole, 30 mg, Nasogastric, QAM AC  potassium chloride, 40 mEq, Oral, Q4H  rosuvastatin, 20 mg, Oral, Daily  sucralfate, 1 g, Oral, BID AC      Infusions     Diet  NPO Diet NPO Type: Strict NPO    I have personally reviewed:  [x]  Laboratory   [x]  Microbiology   [x]  Radiology   [x]  EKG/Telemetry  []  Cardiology/Vascular   []  Pathology    [x]  Records       Assessment/Plan     Active Hospital Problems    Diagnosis  POA    **Weakness [R53.1]  Yes    Anemia [D64.9]  Unknown    History of CVA (cerebrovascular accident) [Z86.73]  Not " Applicable    S/P percutaneous endoscopic gastrostomy (PEG) tube placement [Z93.1]  Not Applicable    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       77 y.o. male admitted with Weakness.    Anemia: Chronic disease and has a history of polycythemia vera on Hydrea.  He does have macrocytosis present.  Will give 1 unit PRBC for hemoglobin of less than 7.  Consult hematology.  Decubitus ulcer: Wound care consult  Polycythemia vera: Family reports that he had recently had a decreased dose of Hydrea to 500.  I do not see it listed on med rec but would hold for now given the anemia.  Hematology consult as above.  History of atrial flutter/ablation: Home Eliquis was held due to anemia and monitoring for stability.  Resume when able.  GERD: PPI/Carafate  PEG: Nutrition consult for tube feeds  History of stroke: Going to hold statin with the elevated LFTs.  Eliquis was held for anemia.  Anticipate resuming when able.  PPX: SCD/see above  Disposition: TBD    Expected Discharge Date: 11/24/2023; Expected Discharge Time:      Michael Griffin MD  [facility]  11/23/23  12:36 EST    Dictated portions of note using Dragon dictation software.  Copied text in this note has been reviewed by me and remains accurate as of 11/23/23

## 2023-11-23 NOTE — PLAN OF CARE
Goal Outcome Evaluation:  Plan of Care Reviewed With: patient, spouse           Outcome Evaluation: Clinical swallow evaluation completed. Per chart, recent VFSS completed 11/06 with recommendation for NPO due to severe oropharyngeal dysphagia. Patient underwent PEG placement 11/09. Patient's spouse present for evaluation who reported slow progress. Patient agreeable to p.o. trials this date. Demonstrated no overt s/sx of aspiration with ice chips x4, nectar via spoon, or honey via spoon. Immediate cough with thin liquid via spoon. SLP encouraged patient to utilize double swallow strategy with all trials due to residue noted on VFSS. Patient refused further trials due to increased back pain. Recommend patient continue NPO with alternate means of nutrition. Allow for ice chips sparingly after oral care for free water protocol. Extensive education provided to patient and spouse regarding recent VFSS results and current recommendations. Both agreeable to recommendations. ST will continue to follow for swallow re-evaluation and dysphagia therapy.      Anticipated Discharge Disposition (SLP): anticipate therapy at next level of care, skilled nursing facility

## 2023-11-24 PROBLEM — T14.8XXA WOUND INFECTION: Status: ACTIVE | Noted: 2023-11-24

## 2023-11-24 PROBLEM — D62 ACUTE BLOOD LOSS ANEMIA: Status: ACTIVE | Noted: 2023-11-24

## 2023-11-24 PROBLEM — R13.11 ORAL PHASE DYSPHAGIA: Status: ACTIVE | Noted: 2023-11-24

## 2023-11-24 PROBLEM — L08.9 WOUND INFECTION: Status: ACTIVE | Noted: 2023-11-24

## 2023-11-24 LAB
ALBUMIN SERPL-MCNC: 2.3 G/DL (ref 3.5–5.2)
ALBUMIN/GLOB SERPL: 0.7 G/DL
ALP SERPL-CCNC: 235 U/L (ref 39–117)
ALT SERPL W P-5'-P-CCNC: 52 U/L (ref 1–41)
ANION GAP SERPL CALCULATED.3IONS-SCNC: 8.5 MMOL/L (ref 5–15)
AST SERPL-CCNC: 62 U/L (ref 1–40)
BASOPHILS # BLD AUTO: 0.03 10*3/MM3 (ref 0–0.2)
BASOPHILS NFR BLD AUTO: 0.3 % (ref 0–1.5)
BH BB BLOOD EXPIRATION DATE: NORMAL
BH BB BLOOD TYPE BARCODE: 9500
BH BB DISPENSE STATUS: NORMAL
BH BB PRODUCT CODE: NORMAL
BH BB UNIT NUMBER: NORMAL
BILIRUB SERPL-MCNC: 0.8 MG/DL (ref 0–1.2)
BUN SERPL-MCNC: 18 MG/DL (ref 8–23)
BUN/CREAT SERPL: 33.3 (ref 7–25)
CALCIUM SPEC-SCNC: 8.2 MG/DL (ref 8.6–10.5)
CHLORIDE SERPL-SCNC: 106 MMOL/L (ref 98–107)
CO2 SERPL-SCNC: 25.5 MMOL/L (ref 22–29)
CREAT SERPL-MCNC: 0.54 MG/DL (ref 0.76–1.27)
CROSSMATCH INTERPRETATION: NORMAL
DEPRECATED RDW RBC AUTO: 65 FL (ref 37–54)
EGFRCR SERPLBLD CKD-EPI 2021: 102 ML/MIN/1.73
EOSINOPHIL # BLD AUTO: 0.08 10*3/MM3 (ref 0–0.4)
EOSINOPHIL NFR BLD AUTO: 0.9 % (ref 0.3–6.2)
ERYTHROCYTE [DISTWIDTH] IN BLOOD BY AUTOMATED COUNT: 17.8 % (ref 12.3–15.4)
GLOBULIN UR ELPH-MCNC: 3.4 GM/DL
GLUCOSE SERPL-MCNC: 101 MG/DL (ref 65–99)
HAPTOGLOB SERPL-MCNC: 24 MG/DL (ref 30–200)
HCT VFR BLD AUTO: 26.9 % (ref 37.5–51)
HGB BLD-MCNC: 8.5 G/DL (ref 13–17.7)
HGB RETIC QN AUTO: 30.2 PG (ref 29.8–36.1)
IMM RETICS NFR: 43.3 % (ref 3–15.8)
LDH SERPL-CCNC: 376 U/L (ref 135–225)
LYMPHOCYTES # BLD AUTO: 0.91 10*3/MM3 (ref 0.7–3.1)
LYMPHOCYTES NFR BLD AUTO: 10.1 % (ref 19.6–45.3)
MAGNESIUM SERPL-MCNC: 2.2 MG/DL (ref 1.6–2.4)
MCH RBC QN AUTO: 32.3 PG (ref 26.6–33)
MCHC RBC AUTO-ENTMCNC: 31.6 G/DL (ref 31.5–35.7)
MCV RBC AUTO: 102.3 FL (ref 79–97)
MONOCYTES # BLD AUTO: 0.93 10*3/MM3 (ref 0.1–0.9)
MONOCYTES NFR BLD AUTO: 10.4 % (ref 5–12)
NEUTROPHILS NFR BLD AUTO: 5.42 10*3/MM3 (ref 1.7–7)
NEUTROPHILS NFR BLD AUTO: 60.4 % (ref 42.7–76)
PHOSPHATE SERPL-MCNC: 2.5 MG/DL (ref 2.5–4.5)
PLATELET # BLD AUTO: 443 10*3/MM3 (ref 140–450)
PMV BLD AUTO: 9.6 FL (ref 6–12)
POTASSIUM SERPL-SCNC: 4.2 MMOL/L (ref 3.5–5.2)
PROT SERPL-MCNC: 5.7 G/DL (ref 6–8.5)
RBC # BLD AUTO: 2.63 10*6/MM3 (ref 4.14–5.8)
RETICS # AUTO: 0.25 10*6/MM3 (ref 0.02–0.13)
RETICS/RBC NFR AUTO: 9.33 % (ref 0.7–1.9)
SODIUM SERPL-SCNC: 140 MMOL/L (ref 136–145)
UNIT  ABO: NORMAL
UNIT  RH: NORMAL
WBC NRBC COR # BLD AUTO: 8.98 10*3/MM3 (ref 3.4–10.8)

## 2023-11-24 PROCEDURE — 99222 1ST HOSP IP/OBS MODERATE 55: CPT | Performed by: INTERNAL MEDICINE

## 2023-11-24 PROCEDURE — 85025 COMPLETE CBC W/AUTO DIFF WBC: CPT | Performed by: INTERNAL MEDICINE

## 2023-11-24 PROCEDURE — 97530 THERAPEUTIC ACTIVITIES: CPT

## 2023-11-24 PROCEDURE — 83010 ASSAY OF HAPTOGLOBIN QUANT: CPT | Performed by: INTERNAL MEDICINE

## 2023-11-24 PROCEDURE — 84100 ASSAY OF PHOSPHORUS: CPT | Performed by: INTERNAL MEDICINE

## 2023-11-24 PROCEDURE — 97162 PT EVAL MOD COMPLEX 30 MIN: CPT

## 2023-11-24 PROCEDURE — 85046 RETICYTE/HGB CONCENTRATE: CPT | Performed by: INTERNAL MEDICINE

## 2023-11-24 PROCEDURE — 83615 LACTATE (LD) (LDH) ENZYME: CPT | Performed by: INTERNAL MEDICINE

## 2023-11-24 PROCEDURE — 97110 THERAPEUTIC EXERCISES: CPT

## 2023-11-24 PROCEDURE — 83735 ASSAY OF MAGNESIUM: CPT | Performed by: INTERNAL MEDICINE

## 2023-11-24 PROCEDURE — 97166 OT EVAL MOD COMPLEX 45 MIN: CPT

## 2023-11-24 PROCEDURE — 80053 COMPREHEN METABOLIC PANEL: CPT | Performed by: INTERNAL MEDICINE

## 2023-11-24 RX ORDER — HYDROCODONE BITARTRATE AND ACETAMINOPHEN 5; 325 MG/1; MG/1
1 TABLET ORAL EVERY 6 HOURS PRN
COMMUNITY
End: 2023-11-29 | Stop reason: HOSPADM

## 2023-11-24 RX ORDER — HYDROCODONE BITARTRATE AND ACETAMINOPHEN 5; 325 MG/1; MG/1
1 TABLET ORAL EVERY 6 HOURS PRN
Status: DISCONTINUED | OUTPATIENT
Start: 2023-11-24 | End: 2023-11-29 | Stop reason: HOSPADM

## 2023-11-24 RX ADMIN — LANSOPRAZOLE 30 MG: 15 TABLET, ORALLY DISINTEGRATING ORAL at 17:54

## 2023-11-24 RX ADMIN — AMITRIPTYLINE HYDROCHLORIDE 50 MG: 50 TABLET, FILM COATED ORAL at 21:26

## 2023-11-24 RX ADMIN — LANSOPRAZOLE 30 MG: 15 TABLET, ORALLY DISINTEGRATING ORAL at 11:30

## 2023-11-24 RX ADMIN — HYDROCODONE BITARTRATE AND ACETAMINOPHEN 1 TABLET: 5; 325 TABLET ORAL at 14:31

## 2023-11-24 RX ADMIN — ANORECTAL OINTMENT 1 APPLICATION: 15.7; .44; 24; 20.6 OINTMENT TOPICAL at 21:26

## 2023-11-24 RX ADMIN — AMLODIPINE BESYLATE 5 MG: 5 TABLET ORAL at 11:30

## 2023-11-24 RX ADMIN — ACETAMINOPHEN 650 MG: 160 SOLUTION ORAL at 11:39

## 2023-11-24 RX ADMIN — SUCRALFATE 1 G: 1 TABLET ORAL at 17:54

## 2023-11-24 RX ADMIN — SUCRALFATE 1 G: 1 TABLET ORAL at 11:30

## 2023-11-24 RX ADMIN — HYDROCODONE BITARTRATE AND ACETAMINOPHEN 1 TABLET: 5; 325 TABLET ORAL at 21:33

## 2023-11-24 NOTE — CONSULTS
[facility]  Initial Inpatient Consult Note    Referring Provider: Dr Morales    Reason for Consultation: Hemoccult positive on NOAC, anemia    Subjective     History of present illness:    77 y.o. male, patient of Dr. Taurus Solares, that we are asked to see for heme positive stool and anemia.    Has had back-to-back hospitalizations.  He had an earlier hospitalization this month secondary to COVID and was discharged to skilled nursing facility where he was found to have severe anemia with readmission.  During his previous hospitalization he was very weak and had failure to thrive and had a PEG tube placed.  He still very weak but feeling better today since transfusion.  He is tolerating his tube feeds.  He was having some dyspeptic symptoms but his daughter reports that medication that was added is been helpful in this regard-it is noted he is on Prevacid SoluTab twice daily.  No visualized blood in the stool.  Eliquis was restarted upon his initial discharge.  It has been held this admission.    Patient underwent recent EGD with PEG tube placement on 11/6/2023.  Chronic gastritis was noted at the time of his EGD.  He had a colonoscopy in 2021 as well as an endoscopy at that time.  He has a history of adenomatous polyps and Mandel's esophagus.  He is anticoagulated with Eliquis-he has a history of a flutter and coronary artery disease.    He has a chronic macrocytic anemia.  His hemoglobin has trended down over the past 2 weeks since his PEG tube was placed.  Hemoglobin was 10.7 on the day of PEG tube placement and dropped as low as 6.9 on 11/23.    Past Medical History:  Past Medical History:   Diagnosis Date    Anxiety     Arthritis     Atrial flutter     Status post cavotricuspid isthmus ablation by Dr. Gustafson on 4/18/17    Mandel esophagus     Benign essential hypertension     CAD (coronary artery disease)     3 vessel CABG 4/11/17 by Dr. Cortez: ROSARIO-prox LAD, SVG-OM1, SVG-OM3     "Carotid artery disease     Status post carotid endarterectomy - [facility] 4/10/17: 50-59% NICHELLE, 1-15% LICA.     Colonic polyp     Cyst of pancreas     DDD (degenerative disc disease), lumbosacral     GERD (gastroesophageal reflux disease)     H/O bone density study 2013    H/O complete eye exam 2014    History of kidney stone     HLD (hyperlipidemia)     Hypertension     Kidney stone     8/22/22    Lipid screening 05/31/2013    Low back pain     [facility]    Screening for prostate cancer 07/07/2015    Skin cancer     nose    Stroke     RESIDUAL--\"BALANCE ISSUES\"     Past Surgical History:  Past Surgical History:   Procedure Laterality Date    CARDIAC CATHETERIZATION N/A 04/10/2017    Procedure: Left Heart Cath;  Surgeon: Marjorie Healy MD;  Location: [facility] CATH INVASIVE LOCATION;  Service:     CARDIAC CATHETERIZATION N/A 04/10/2017    Procedure: Coronary angiography;  Surgeon: Marjorie Healy MD;  Location: [facility] CATH INVASIVE LOCATION;  Service:     CARDIAC CATHETERIZATION N/A 04/10/2017    Procedure: Left ventriculography;  Surgeon: Marjorie Healy MD;  Location: [facility] CATH INVASIVE LOCATION;  Service:     CARDIAC CATHETERIZATION  2011    CARDIAC ELECTROPHYSIOLOGY PROCEDURE N/A 04/18/2017    Procedure: Ablation atrial flutter;  Surgeon: Jose Antonio Gustafson MD;  Location: [facility] CATH INVASIVE LOCATION;  Service:     CAROTID ENDARTERECTOMY      CHOLECYSTECTOMY      CHOLECYSTECTOMY WITH INTRAOPERATIVE CHOLANGIOGRAM N/A 09/07/2019    Procedure: Laparoscopic cholecystectomy with intraoperative cholangiogram;  Surgeon: Gauri Travis MD;  Location: [facility] MAIN OR;  Service: General    COLONOSCOPY  01/06/2015    Diverticulosis, one TA    COLONOSCOPY N/A 02/14/2019    tics, NBIH, adenomatous polyp x 2    COLONOSCOPY N/A 11/05/2021    Procedure: COLONOSCOPY TO CECUM AND TERM. ILEUM WITH COLD POLYPECTOMIES;  Surgeon: Everton Abel MD;  Location: [facility] ENDOSCOPY;  Service: Gastroenterology;  " Laterality: N/A;  PRE OP - PERS H/O POLYPS  POST OP - COLON POLYPS,, DIVERTICULOSIS, HEMORRHOIDS    CORONARY ARTERY BYPASS GRAFT N/A 2017    Procedure: AR STERNOTOMY CORONARY ARTERY BYPASS GRAFT TIMES 3 USING LEFT INTERNAL MAMMARY ARTERY AND LEFT GREATER SAPHENOUS VEIN GRAFT PER ENDOSCOPIC VEIN HARVESTING AND PRP ;  Surgeon: Temo Cortez MD;  Location: [facility] MAIN OR;  Service:     ENDOSCOPY  2015    HH, Ervin's esophagus    ENDOSCOPY N/A 2019    Z line irregular, HH, Ervin's esophagus    ENDOSCOPY N/A 2021    Procedure: ESOPHAGOGASTRODUODENOSCOPY WITH BIOPSIES;  Surgeon: Everton Abel MD;  Location:  [facility];  Service: Gastroenterology;  Laterality: N/A;  PRE OP - PERS H/O ERVIN'S  POST OP - IRREG Z LINE    ENDOSCOPY W/ PEG TUBE PLACEMENT N/A 2023    Procedure: ESOPHAGOGASTRODUODENOSCOPY WITH PERCUTANEOUS ENDOSCOPIC GASTROSTOMY TUBE INSERTION;  Surgeon: Temo Ramsey MD;  [facility];  Service: General;  Laterality: N/A;  Pre: dysphagia  Post: same    KNEE SURGERY Left     URETEROSCOPY LASER LITHOTRIPSY WITH STENT INSERTION Left 2022    Procedure: Cysto retrograde with left uretro stent placement;  Surgeon: Damien Oliveira MD;  Location: [facility] MAIN OR;  Service: Urology;  Laterality: Left;    VASECTOMY        Social History:   Social History     Tobacco Use    Smoking status: Former     Packs/day: 1     Types: Cigarettes     Start date: 1959     Quit date: 2009     Years since quittin.9    Smokeless tobacco: Never    Tobacco comments:     CAFFEINE USE   Substance Use Topics    Alcohol use: Not Currently     Comment: rarely      Family History:  Family History   Problem Relation Age of Onset    Hypertension Mother     Heart disease Mother     Heart attack Mother     Stroke Mother     Heart disease Father     Heart attack Father     Stroke Father     Hypertension Sister     Heart attack Brother     Heart disease  Brother     No Known Problems Brother     Heart disease Brother     Heart attack Brother     Diabetes Brother     No Known Problems Maternal Grandmother     No Known Problems Maternal Grandfather     No Known Problems Paternal Grandmother     No Known Problems Paternal Grandfather     Pancreatic cancer Paternal Cousin     Arthritis Other     Diabetes Other     Hypertension Other     Kidney disease Other         stones    Malig Hyperthermia Neg Hx        Home Meds:  Facility-Administered Medications Prior to Admission   Medication Dose Route Frequency Provider Last Rate Last Admin    cyanocobalamin injection 1,000 mcg  1,000 mcg Intramuscular Q30 Days Damian Sanchez MD   1,000 mcg at 04/06/23 0957     Medications Prior to Admission   Medication Sig Dispense Refill Last Dose    amitriptyline (ELAVIL) 50 MG tablet TAKE ONE TABLET BY MOUTH ONCE NIGHTLY 30 tablet 3 11/21/2023    amLODIPine (NORVASC) 5 MG tablet Take 1 tablet by mouth Daily.   11/21/2023    apixaban (ELIQUIS) 5 MG tablet tablet Take 1 tablet by mouth 2 (Two) Times a Day. 90 tablet 3 11/21/2023    furosemide (LASIX) 20 MG tablet TAKE ONE TABLET BY MOUTH DAILY 7 tablet 1 11/21/2023    HYDROcodone-acetaminophen (NORCO) 5-325 MG per tablet Take 1 tablet by mouth Every 6 (Six) Hours As Needed.       lansoprazole (PREVACID SOLUTAB) 30 MG Tablet Delayed Release Dispersible disintegrating tablet 1 tablet by Nasogastric route Every Morning Before Breakfast.   11/21/2023    oxyBUTYnin (DITROPAN) 5 MG/5ML solution oral solution Administer 5 mL per G tube 2 (Two) Times a Day.   11/21/2023    rosuvastatin (Crestor) 20 MG tablet Take 1 tablet by mouth Daily. 90 tablet 1 11/21/2023    sucralfate (CARAFATE) 1 g tablet Take 1 tablet by mouth 2 (Two) Times a Day Before Meals.   11/21/2023    guaifenesin (ROBITUSSIN) 100 MG/5ML liquid Take 10 mL by mouth Every 4 (Four) Hours As Needed for Cough.   Unknown    ipratropium-albuterol (DUO-NEB) 0.5-2.5 mg/3 ml nebulizer Take 3  mL by nebulization Every 4 (Four) Hours As Needed for Shortness of Air.   Unknown    mupirocin (BACTROBAN) 2 % cream Apply 1 application topically to the appropriate area as directed 2 (Two) Times a Day. 15 g 0 Unknown    naloxone (NARCAN) 4 MG/0.1ML nasal spray Call 911. Don't prime. [place] in 1 nostril for overdose. Repeat in 2-3 minutes in other nostril if no or minimal breathing/responsiveness. 2 each 0      Current Meds:   amitriptyline, 50 mg, Per G Tube, Nightly  amLODIPine, 5 mg, Per G Tube, Daily  lansoprazole, 30 mg, Per G Tube, BID AC  sucralfate, 1 g, Per G Tube, BID AC      Allergies:  Allergies   Allergen Reactions    Atorvastatin Myalgia     Myalgia    Penicillins Hives, Swelling and Rash     Tolerates cephalosporins      Review of Systems  Pertinent items are noted in HPI     Objective     Vital Signs  Temp:  [97.7 °F (36.5 °C)-99 °F (37.2 °C)] 97.7 °F (36.5 °C)  Heart Rate:  [] 86  Resp:  [18] 18  BP: (127-136)/(60-72) 135/65  Physical Exam:  General Appearance:    Alert, cooperative, in no acute distress   Head:    Normocephalic, without obvious abnormality, atraumatic   Eyes:          conjunctivae and sclerae normal, no   icterus   Throat:   no thrush, oral mucosa moist   Neck:   Supple, no adenopathy   Lungs:     Clear to auscultation bilaterally    Heart:    Regular rhythm and normal rate    Chest Wall:    No abnormalities observed   Abdomen:     Soft, nondistended, nontender; gastrostomy tube in the left upper quadrant-dressing clean and dry-no oozing from site.   Extremities:   no edema, no redness   Skin:   No bruising or rash       Results Review:   I reviewed the patient's new clinical results.    Results from last 7 days   Lab Units 11/24/23  0612 11/23/23  0615 11/22/23  0955   WBC 10*3/mm3 8.98 9.05 15.56*   HEMOGLOBIN g/dL 8.5* 6.9* 7.3*   HEMATOCRIT % 26.9* 21.8* 23.0*   PLATELETS 10*3/mm3 443 418 456*     Results from last 7 days   Lab Units 11/24/23  0612 11/23/23  5555  11/23/23  0815 11/23/23  0615 11/22/23  0955   SODIUM mmol/L 140  --  136 139 135*   POTASSIUM mmol/L 4.2 4.9 3.1* 3.1* 3.3*   CHLORIDE mmol/L 106  --  101 102 95*   CO2 mmol/L 25.5  --  29.8* 29.7* 32.5*   BUN mg/dL 18  --  14 13 18   CREATININE mg/dL 0.54*  --  0.58* 0.55* 0.69*   CALCIUM mg/dL 8.2*  --  8.1* 7.9* 8.3*   BILIRUBIN mg/dL 0.8  --  0.9  --  0.7   ALK PHOS U/L 235*  --  207*  --  269*   ALT (SGPT) U/L 52*  --  50*  --  66*   AST (SGOT) U/L 62*  --  51*  --  69*   GLUCOSE mg/dL 101*  --  87 84 122*         Lab Results   Lab Value Date/Time    LIPASE 11 (L) 01/26/2020 1445    LIPASE 12 (L) 09/06/2019 0932    LIPASE 25 01/27/2019 2236       Radiology:  XR Chest 1 View   Final Result   As described.               This report was finalized on 11/22/2023 10:30 AM by Dr. Mukesh Bruce M.D on Workstation: [id number]              Assessment & Plan   Active Hospital Problems    Diagnosis     **Weakness     Wound infection     Oral phase dysphagia     Acute blood loss anemia     Anemia     History of CVA (cerebrovascular accident)     S/P percutaneous endoscopic gastrostomy (PEG) tube placement     Mandel esophagus     Elevated troponin     Leukocytosis     Sacral decubitus ulcer     Polycythemia     Chronic anticoagulation     S/P CABG (coronary artery bypass graft)     Benign essential hypertension        Assessment:  Acute on chronic anemia  History of GERD with Mandel's esophagus  Status postgastrostomy placement 11/6/2023  Debility  History of CVA on chronic anticoagulation    Plan:  Continue twice daily Prevacid.  Anemia is likely multifactorial.  He has a chronic macrocytic anemia - hemoglobin has worsened since his PEG tube was placed.  He was restarted on anticoagulation as an outpatient.  He likely has had some blood loss from the PEG site related to anticoagulation.  Recommend holding this for short period to hopefully allow this to further heal and prevent further blood loss.  He will need  close observation when this is restarted and iron supplementation if needed.  There were no abnormalities noted in his upper GI tract on his recent EGD that would otherwise contribute to blood loss.  Plans for hematology workup noted.  Continue tube feeds  No plans for endoscopic evaluation at this time-we will follow his clinical course        I discussed the patients findings and my recommendations with patient and family.    Kate Coates MD

## 2023-11-24 NOTE — NURSING NOTE
11/24/23 1151   Wound 10/27/23 0820 coccyx Pressure Injury   Placement Date/Time: 10/27/23 0820   Present on Original Admission: No  Location: coccyx  Primary Wound Type: (c) Pressure Injury  Additional Comments: dti   Pressure Injury Stage DTPI   Dressing Appearance moist drainage   Base non-blanchable;moist;purple   Periwound redness;swelling   Periwound Temperature warm   Periwound Skin Turgor soft   Edges open   Drainage Characteristics/Odor serosanguineous   Drainage Amount scant   Care, Wound cleansed with;sterile water;barrier applied   Dressing Care dressing changed   Periwound Care topical treatment applied   Skin Interventions   Pressure Reduction Devices specialty bed utilized  (Low air loss mattress)   Pressure Reduction Techniques heels elevated off bed;positioned off wounds;pressure points protected     Wound/Ostomy: Consult received regarding skin issue on Coccyx area. Patient is alert, daughter at bedside. Upon assessment is observed non-intact skin with localized area of persistent non-blanchable purple discoloration on Coccyx/Gluteal cleft, possible related with moisture and  DTI POA.  Patient is at risk of further skin damage, he is bedridden, impired mobility and is incontinent, family member notified, and education about the importance of reposiitoning every 2 h and minimize the skin contact with urine or stool was provided.  Wound care order and pressure standing measures have been implemented into Epic.   Low air loss mattress for adequate pressure redistribution and pressure relief from Agility and frame from EVS will be requested, floor nurse will call.  Please re-consult for any additional needs.

## 2023-11-24 NOTE — THERAPY EVALUATION
Patient Name: Madhu Santoro  : 1945    MRN: [id number]                              Today's Date: 2023       Admit Date: 2023    Visit Dx:     ICD-10-CM ICD-9-CM   1. Weakness  R53.1 780.79   2. Elevated troponin  R79.89 790.6   3. Hypoxia  R09.02 799.02     Patient Active Problem List   Diagnosis    Anxiety    Arthritis    Coronary artery disease due to lipid rich plaque    HLD (hyperlipidemia)    Benign essential hypertension    DDD (degenerative disc disease), lumbosacral    Cerebrovascular accident    Encounter for screening for cardiovascular disorders    Gastroesophageal reflux disease    Hyperlipidemia    Stenosis of carotid artery    Former smoker    History of cardiac catheterization    S/P ablation of atrial flutter    Typical atrial flutter    S/P CABG (coronary artery bypass graft)    Adenomatous polyp of ascending colon    Abdominal bloating    Stroke    PAD (peripheral artery disease)    Acute cholecystitis    Right upper quadrant abdominal pain    Chronic pain of both hips    Chronic bilateral low back pain without sciatica    Sacroiliac joint dysfunction of both sides    Encounter for long-term (current) use of high-risk medication    Lumbar facet arthropathy    Stroke-like symptoms    CVA (cerebral vascular accident)    Personal history of colonic polyps    Arthralgia of multiple joints    Iron deficiency    Thrombocytosis    Left ureteral stone    Obstructive uropathy    Chronic anticoagulation    Facial skin lesion    Iron deficiency anemia    Polycythemia    Neuropathy    Weakness    Pneumonia    Close exposure to COVID-19 virus    Polycythemia vera    Chronic gout without tophus    COVID-19    Cytokine release syndrome, grade 2    Anemia    History of CVA (cerebrovascular accident)    S/P percutaneous endoscopic gastrostomy (PEG) tube placement    Mandel esophagus    Elevated troponin    Leukocytosis    Sacral decubitus ulcer    Wound infection    Oral phase dysphagia     "Acute blood loss anemia     Past Medical History:   Diagnosis Date    Anxiety     Arthritis     Atrial flutter     Status post cavotricuspid isthmus ablation by Dr. Gustafson on 4/18/17    Mandel esophagus     Benign essential hypertension     CAD (coronary artery disease)     3 vessel CABG 4/11/17 by Dr. Cortez: ROSARIO-prox LAD, SVG-OM1, SVG-OM3    Carotid artery disease     Status post carotid endarterectomy - USG 4/10/17: 50-59% NICHELLE, 1-15% LICA.     Colonic polyp     Cyst of pancreas     DDD (degenerative disc disease), lumbosacral     GERD (gastroesophageal reflux disease)     H/O bone density study 2013    H/O complete eye exam 2014    History of kidney stone     HLD (hyperlipidemia)     Hypertension     Kidney stone     8/22/22    Lipid screening 05/31/2013    Low back pain     [facility]    Screening for prostate cancer 07/07/2015    Skin cancer     nose    Stroke     RESIDUAL--\"BALANCE ISSUES\"     Past Surgical History:   Procedure Laterality Date    CARDIAC CATHETERIZATION N/A 04/10/2017    Procedure: Left Heart Cath;  Surgeon: Marjorie Healy MD;  Location: [facility] CATH INVASIVE LOCATION;  Service:     CARDIAC CATHETERIZATION N/A 04/10/2017    Procedure: Coronary angiography;  Surgeon: Marjorie Healy MD;  [facility];  Service:     CARDIAC CATHETERIZATION N/A 04/10/2017    Procedure: Left ventriculography;  Surgeon: Marjorie Healy MD;  Location: [facility] CATH INVASIVE LOCATION;  Service:     CARDIAC CATHETERIZATION  2011    CARDIAC ELECTROPHYSIOLOGY PROCEDURE N/A 04/18/2017    Procedure: Ablation atrial flutter;  Surgeon: Jose Antonio Gustafson MD;  Location: [facility] CATH INVASIVE LOCATION;  Service:     CAROTID ENDARTERECTOMY      CHOLECYSTECTOMY      CHOLECYSTECTOMY WITH INTRAOPERATIVE CHOLANGIOGRAM N/A 09/07/2019    Procedure: Laparoscopic cholecystectomy with intraoperative cholangiogram;  Surgeon: Gauri Travis MD;  Location: [facility] MAIN OR;  " Service: General    COLONOSCOPY  01/06/2015    Diverticulosis, one TA    COLONOSCOPY N/A 02/14/2019    tics, NBIH, adenomatous polyp x 2    COLONOSCOPY N/A 11/05/2021    Procedure: COLONOSCOPY TO CECUM AND TERM. ILEUM WITH COLD POLYPECTOMIES;  Surgeon: Everton Abel MD;  [facility];  Service: Gastroenterology;  Laterality: N/A;  PRE OP - PERS H/O POLYPS  POST OP - COLON POLYPS,, DIVERTICULOSIS, HEMORRHOIDS    CORONARY ARTERY BYPASS GRAFT N/A 04/11/2017    Procedure: AR STERNOTOMY CORONARY ARTERY BYPASS GRAFT TIMES 3 USING LEFT INTERNAL MAMMARY ARTERY AND LEFT GREATER SAPHENOUS VEIN GRAFT PER ENDOSCOPIC VEIN HARVESTING AND PRP ;  Surgeon: Temo Cortez MD;  Location: [facility] MAIN OR;  Service:     ENDOSCOPY  01/06/2015    HH, Ervin's esophagus    ENDOSCOPY N/A 02/14/2019    Z line irregular, HH, Ervin's esophagus    ENDOSCOPY N/A 11/05/2021    Procedure: ESOPHAGOGASTRODUODENOSCOPY WITH BIOPSIES;  Surgeon: Everton Abel MD;  [facility];  Service: Gastroenterology;  Laterality: N/A;  PRE OP - PERS H/O ERVIN'S  POST OP - IRREG Z LINE    ENDOSCOPY W/ PEG TUBE PLACEMENT N/A 11/6/2023    Procedure: ESOPHAGOGASTRODUODENOSCOPY WITH PERCUTANEOUS ENDOSCOPIC GASTROSTOMY TUBE INSERTION;  Surgeon: Temo Ramsey MD;  [facility];  Service: General;  Laterality: N/A;  Pre: dysphagia  Post: same    KNEE SURGERY Left     URETEROSCOPY LASER LITHOTRIPSY WITH STENT INSERTION Left 8/23/2022    Procedure: Cysto retrograde with left uretro stent placement;  Surgeon: Damien Oliveira MD;  Location: [facility] MAIN OR;  Service: Urology;  Laterality: Left;    VASECTOMY        General Information       Row Name 11/24/23 1228          OT Time and Intention    Document Type evaluation  -JW     Mode of Treatment occupational therapy  -JW       Row Name 11/24/23 1228          General Information    Patient Profile Reviewed yes  -JW     Existing Precautions/Restrictions  fall;NPO  PEG  -     Barriers to Rehab previous functional deficit  -       Row Name 11/24/23 1228          Living Environment    People in Home --  [facility] for rehab  -       Row Name 11/24/23 1228          Cognition    Orientation Status (Cognition) oriented x 3  -       Row Name 11/24/23 1228          Safety Issues, Functional Mobility    Impairments Affecting Function (Mobility) balance;endurance/activity tolerance;pain;range of motion (ROM);strength  -               User Key  (r) = Recorded By, (t) = Taken By, (c) = Cosigned By      Initials Name Provider Type     Nevin Mcrae OT Occupational Therapist                     Mobility/ADL's       Row Name 11/24/23 1229          Bed Mobility    Bed Mobility supine-sit;sit-supine;scooting/bridging  -     Scooting/Bridging [place] (Bed Mobility) dependent (less than 25% patient effort);2 person assist  -     Supine-Sit [place] (Bed Mobility) maximum assist (25% patient effort);verbal cues;1 person assist  -     Sit-Supine [place] (Bed Mobility) maximum assist (25% patient effort);1 person assist;verbal cues  -     Assistive Device (Bed Mobility) bed rails;draw sheet  -       Row Name 11/24/23 1229          Transfers    Transfers sit-stand transfer  -       Row Name 11/24/23 1229          Sit-Stand Transfer    Sit-Stand [place] (Transfers) maximum assist (25% patient effort);verbal cues  -     Assistive Device (Sit-Stand Transfers) walker, front-wheeled  -     Comment, (Sit-Stand Transfer) attempted with and without RW. Pt very fatigued after standing trial and unable to complete TF to chair d/t weakness  -       Row Name 11/24/23 1229          Activities of Daily Living    BADL Assessment/Intervention lower body dressing;grooming  -       Row Name 11/24/23 1229          Lower Body Dressing Assessment/Training    [place] Level (Lower Body Dressing) don;doff;socks;dependent (less than 25% patient effort)   -       Row Name 11/24/23 1229          Self-Feeding Assessment/Training    Comment, (Feeding) NPO, PEG  -       Row Name 11/24/23 1229          Grooming Assessment/Training    [place] Level (Grooming) hair care, combing/brushing;moderate assist (50% patient effort)  -     Position (Grooming) edge of bed sitting  -     Comment, (Grooming) limited by weakness  -               User Key  (r) = Recorded By, (t) = Taken By, (c) = Cosigned By      Initials Name Provider Type    Nevin Serna OT Occupational Therapist                   Obj/Interventions       Row Name 11/24/23 1231          Sensory Assessment (Somatosensory)    Sensory Assessment (Somatosensory) sensation intact  -       Row Name 11/24/23 1231          Vision Assessment/Intervention    Visual Impairment/Limitations WNL  -[facility] Name 11/24/23 1231          Range of Motion Comprehensive    Comment, General Range of Motion UE AROM limited d/t weakness, AAROM WFL  -[facility] Name 11/24/23 1231          Strength Comprehensive (MMT)    Comment, General Manual Muscle Testing (MMT) Assessment 3-/5 UEs grossly  -[facility] Name 11/24/23 1231          Motor Skills    Motor Skills functional endurance  -     Functional Endurance poor. Muscle atrophy and poor endurance from recent hospital stay d/t COVID  -       Row Name 11/24/23 1231          Balance    Static Sitting Balance contact guard;minimal assist  -     Position, Sitting Balance sitting edge of bed  -     Static Standing Balance moderate assist  -     Position/Device Used, Standing Balance supported;walker, front-wheeled  -     Comment, Balance cues and assist for posture, fatigues quickly  -               User Key  (r) = Recorded By, (t) = Taken By, (c) = Cosigned By      Initials Name Provider Type    Nevin Serna OT Occupational Therapist                   Goals/Plan       Row Name 11/24/23 1244          Bed Mobility Goal 1 (OT)    Activity/Assistive Device  (Bed Mobility Goal 1, OT) bed mobility activities, all  -JW     [place] Level/Cues Needed (Bed Mobility Goal 1, OT) moderate assist (50-74% patient effort)  -JW     Time Frame (Bed Mobility Goal 1, OT) short term goal (STG);2 weeks  -JW     Progress/Outcomes (Bed Mobility Goal 1, OT) goal ongoing  -       Row Name 11/24/23 1244          Transfer Goal 1 (OT)    Activity/Assistive Device (Transfer Goal 1, OT) commode, bedside without drop arms;bed-to-chair/chair-to-bed;sit-to-stand/stand-to-sit  -JW     [place] Level/Cues Needed (Transfer Goal 1, OT) moderate assist (50-74% patient effort)  -JW     Time Frame (Transfer Goal 1, OT) short term goal (STG);2 weeks  -JW     Strategies/Barriers (Transfers Goal 1, OT) using AD as needed  -JW     Progress/Outcome (Transfer Goal 1, OT) goal ongoing  -       Row Name 11/24/23 1244          Bathing Goal 1 (OT)    Activity/Device (Bathing Goal 1, OT) upper body bathing  -JW     [place] Level/Cues Needed (Bathing Goal 1, OT) minimum assist (75% or more patient effort)  -JW     Time Frame (Bathing Goal 1, OT) short term goal (STG);2 weeks  -JW     Strategies/Barriers (Bathing Goal 1, OT) sponge bath at EOB  -JW     Progress/Outcomes (Bathing Goal 1, OT) goal ongoing  -       Row Name 11/24/23 1244          Dressing Goal 1 (OT)    Activity/Device (Dressing Goal 1, OT) upper body dressing  -JW     [place]/Cues Needed (Dressing Goal 1, OT) minimum assist (75% or more patient effort)  -JW     Time Frame (Dressing Goal 1, OT) short term goal (STG);2 weeks  -JW     Progress/Outcome (Dressing Goal 1, OT) goal ongoing  -       Row Name 11/24/23 1244          Grooming Goal 1 (OT)    Activity/Device (Grooming Goal 1, OT) grooming skills, all  -JW     [place] (Grooming Goal 1, OT) standby assist  -JW     Time Frame (Grooming Goal 1, OT) short term goal (STG);2 weeks  -JW     Strategies/Barriers (Grooming Goal 1, OT) EOB  -JW     Progress/Outcome (Grooming Goal  1, OT) goal ongoing  -       Row Name 11/24/23 1244          ROM Goal 1 (OT)    ROM Goal 1 (OT) Pt will increase AROM in UEs to >90deg shoulder flexion for increased participation in ADLs  -     Time Frame (ROM Goal 1, OT) short term goal (STG);2 weeks  -     Progress/Outcome (ROM Goal 1, OT) goal ongoing  -       Row Name 11/24/23 1244          Therapy Assessment/Plan (OT)    Planned Therapy Interventions (OT) activity tolerance training;adaptive equipment training;BADL retraining;functional balance retraining;patient/caregiver education/training;occupation/activity based interventions;strengthening exercise;transfer/mobility retraining;ROM/therapeutic exercise  -               User Key  (r) = Recorded By, (t) = Taken By, (c) = Cosigned By      Initials Name Provider Type    Nevin Serna, MEHDI Occupational Therapist                   Clinical Impression       Row Name 11/24/23 1236          Pain Assessment    Pretreatment Pain Rating 6/10  -     Posttreatment Pain Rating 6/10  -     Pain Location - back  -     Pain Intervention(s) Repositioned;Ambulation/increased activity  -       Row Name 11/24/23 1236          Plan of Care Review    Plan of Care Reviewed With patient;daughter  -     Outcome Evaluation Pt is a 78 y/o M admitted from [facility] with weakness and anemia. Pt recently with recent prolonged hospital stay d/t COVID-19, prior to this was fully independent. States he was working on TFs with RW atrehab, needing assist with ADLs, now with PEG. Presents today with global weakness and poor endurance. He req's maxA to sit EOB, total A for LB ADLs and toileting, ModA for grooming and UB ADLs using clinical judgement based on current level of function. Pt stands at the bedside 2x trials in prep for ADL TFs, MaxA and increased time to stand fully with use of RW. Maintains stand ~5 seconds before fatiguing and requires seated rest break. Asssted back to supine and left with all needs in  reach. OT will cont to follow to address deficits, recommeding return to rehab  -       Row Name 11/24/23 1236          Therapy Assessment/Plan (OT)    Rehab Potential (OT) good, to achieve stated therapy goals  -     Criteria for Skilled Therapeutic Interventions Met (OT) yes;skilled treatment is necessary  -     Therapy Frequency (OT) 5 times/wk  -       Row Name 11/24/23 1236          Therapy Plan Review/Discharge Plan (OT)    Anticipated Discharge Disposition (OT) skilled nursing facility  -       Row Name 11/24/23 1236          Positioning and Restraints    Pre-Treatment Position in bed  -JW     Post Treatment Position bed  -JW     In Bed fowlers;call light within reach;encouraged to call for assist;exit alarm on;with family/caregiver  -               User Key  (r) = Recorded By, (t) = Taken By, (c) = Cosigned By      Initials Name Provider Type    Nevin Serna, MEHDI Occupational Therapist                   Outcome Measures       Row Name 11/24/23 1247          How much help from another is currently needed...    Putting on and taking off regular lower body clothing? 1  -JW     Bathing (including washing, rinsing, and drying) 2  -JW     Toileting (which includes using toilet bed pan or urinal) 1  -JW     Putting on and taking off regular upper body clothing 2  -JW     Taking care of personal grooming (such as brushing teeth) 2  -JW     Eating meals 1  -JW     AM-PAC 6 Clicks Score (OT) 9  -       Row Name 11/24/23 9798          How much help from another person do you currently need...    Turning from your back to your side while in flat bed without using bedrails? 2  -EE     Moving from lying on back to sitting on the side of a flat bed without bedrails? 2  -EE     Moving to and from a bed to a chair (including a wheelchair)? 2  -EE     Standing up from a chair using your arms (e.g., wheelchair, bedside chair)? 2  -EE     Climbing 3-5 steps with a railing? 1  -EE     To walk in hospital room? 1   -EE     AM-PAC 6 Clicks Score (PT) 10  -EE     Highest Level of Mobility Goal 4 --> Transfer to chair/commode  -EE       Row Name 11/24/23 1247          Functional Assessment    Outcome Measure Options AM-PAC 6 Clicks Daily Activity (OT)  -ARON               User Key  (r) = Recorded By, (t) = Taken By, (c) = Cosigned By      Initials Name Provider Type    EE Tawana Weeks, PT Physical Therapist    Nevin Serna OT Occupational Therapist                    Occupational Therapy Education       Title: PT OT SLP Therapies (Done)       Topic: Occupational Therapy (Done)       Point: ADL training (Done)       Description:   Instruct learner(s) on proper safety adaptation and remediation techniques during self care or transfers.   Instruct in proper use of assistive devices.                  Learning Progress Summary             Patient Acceptance, E, VU,NR by ARON at 11/24/2023 1247    Acceptance, E,TB, VU by ABILIO at 11/22/2023 1748                         Point: Home exercise program (Done)       Description:   Instruct learner(s) on appropriate technique for monitoring, assisting and/or progressing therapeutic exercises/activities.                  Learning Progress Summary             Patient Acceptance, E,TB, VU by ABILIO at 11/22/2023 1748                         Point: Precautions (Done)       Description:   Instruct learner(s) on prescribed precautions during self-care and functional transfers.                  Learning Progress Summary             Patient Acceptance, E, VU,NR by ARON at 11/24/2023 1247    Acceptance, E,TB, VU by ABILIO at 11/22/2023 1748                         Point: Body mechanics (Done)       Description:   Instruct learner(s) on proper positioning and spine alignment during self-care, functional mobility activities and/or exercises.                  Learning Progress Summary             Patient Acceptance, E, VU,NR by ARON at 11/24/2023 1247    Acceptance, E,TB, VU by ABILIO at 11/22/2023 1748                                          User Key       Initials Effective Dates Name Provider Type Discipline     10/17/23 -  Nancy Garcia RN Registered Nurse Nurse    JW 06/10/21 -  Nevin Mcrae OT Occupational Therapist OT                  OT Recommendation and Plan  Planned Therapy Interventions (OT): activity tolerance training, adaptive equipment training, BADL retraining, functional balance retraining, patient/caregiver education/training, occupation/activity based interventions, strengthening exercise, transfer/mobility retraining, ROM/therapeutic exercise  Therapy Frequency (OT): 5 times/wk  Plan of Care Review  Plan of Care Reviewed With: patient, daughter  Outcome Evaluation: Pt is a 78 y/o M admitted from [facility] with weakness and anemia. Pt recently with recent prolonged hospital stay d/t COVID-19, prior to this was fully independent. States he was working on TFs with RW atrehab, needing assist with ADLs, now with PEG. Presents today with global weakness and poor endurance. He req's maxA to sit EOB, total A for LB ADLs and toileting, ModA for grooming and UB ADLs using clinical judgement based on current level of function. Pt stands at the bedside 2x trials in prep for ADL TFs, MaxA and increased time to stand fully with use of RW. Maintains stand ~5 seconds before fatiguing and requires seated rest break. Asssted back to supine and left with all needs in reach. OT will cont to follow to address deficits, recommeding return to rehab     Time Calculation:   Evaluation Complexity (OT)  Review Occupational Profile/Medical/Therapy History Complexity: expanded/moderate complexity  Assessment, Occupational Performance/Identification of Deficit Complexity: 3-5 performance deficits  Clinical Decision Making Complexity (OT): detailed assessment/moderate complexity  Overall Complexity of Evaluation (OT): moderate complexity     Time Calculation- OT       Row Name 11/24/23 1248             Time Calculation- OT    OT  Start Time 1042  -JW      OT Stop Time 1110  -JW      OT Time Calculation (min) 28 min  -JW      Total Timed Code Minutes- OT 18 minute(s)  -JW      OT Received On 11/24/23  -JW      OT - Next Appointment 11/27/23  -JW      OT Goal Re-Cert Due Date 12/08/23  -JW         Timed Charges    59450 - OT Therapeutic Activity Minutes 18  -JW         Untimed Charges    OT Eval/Re-eval Minutes 10  -JW         Total Minutes    Timed Charges Total Minutes 18  -JW      Untimed Charges Total Minutes 10  -JW       Total Minutes 28  -JW                User Key  (r) = Recorded By, (t) = Taken By, (c) = Cosigned By      Initials Name Provider Type    JW Nevin Mcrae OT Occupational Therapist                  Therapy Charges for Today       Code Description Service Date Service Provider Modifiers Qty    83954623529 HC OT THERAPEUTIC ACT EA 15 MIN 11/24/2023 Nevin Mcrae OT GO 1    27477077001 HC OT EVAL MOD COMPLEXITY 3 11/24/2023 Nevin Mcrae OT GO 1                 Nevin Mcrae OT  11/24/2023

## 2023-11-24 NOTE — PLAN OF CARE
Goal Outcome Evaluation:  Plan of Care Reviewed With: patient, daughter           Outcome Evaluation: Pt is a 76 yo male who presents from SNF (subacute rehab) with weakness and progressive anemia. Pt with current decubitus ulcer, also with PMH of CVA, CABG, chronic low back pain. Prior to admission, pt was working with PT at SNF and states he was standing and transferring with rwx and assist. Upon exam, pt demonstrates generalized weakness, impaired LE ROM, impaired posture, impaired trunk control, and decreased endurance limiting mobility. Pt required max A with bed mobility and max A to stand at EOB with rwx. Pt completed supine and seated ther ex with cues. Pt is at increased risk of falls and will continue to benefit from PT to address impairments and increase independence with functional mobility. Recommend DC to SNF for ongoing therapy once medically stable.      Anticipated Discharge Disposition (PT): skilled nursing facility

## 2023-11-24 NOTE — CONSULTS
Subjective     REASON FOR FOLLOW-UP: Polycythemia vera, JAK2 mutation detected     REASON FOR CONSULTATION:  same with anemia  Provide an opinion on any further workup or treatment                             REQUESTING PHYSICIAN:  [facility]    RECORDS OBTAINED:  Records of the patients history including those obtained from the referring provider were reviewed and summarized in detail.    HISTORY OF PRESENT ILLNESS:  The patient is a 77 y.o. year old male who is here for an opinion about the above issue.    History of Present Illness Patient is a 77-year-old male with follow-up in the office for polycythemia vera on low-dose hydroxyurea recently hospitalized with COVID-pneumonia and discharged to the nursing home readmitted with anemia hemoglobin of 7.3.  There is no mention made of obvious bleeding the patient denies bleeding but I am not sure he is aware of the color of his stool etc.  Reticulocyte count was unexpectedly high at 11% which suggest hemolysis although his acute bleeding this can be seen.  Iron saturation 19% ferritin 566 B12 and folate normal    Patient has had transfusion and feels much better.  Of note he has been off hydroxyurea in the nursing home since discharge so this is not playing a role.  His hemoglobin at discharge from 11/9/2023 was 8.8 and the only procedure he has had done placement of the PEG tube which is unlikely to cause significant bleeding.    His daughter states he is complaining of dyspepsia and heartburn in his concern for GI bleed.  He was on Eliquis and this has been held    He is currently very deconditioned and has a decubitus ulcer is working with physical therapy    Past Medical History:   Diagnosis Date    Anxiety     Arthritis     Atrial flutter     Status post cavotricuspid isthmus ablation by Dr. Gustafson on 4/18/17    Mandel esophagus     Benign essential hypertension     CAD (coronary artery disease)     3 vessel CABG 4/11/17 by Dr. Cortez: ROSARIO-prox LAD, SVG-OM1,  "SVG-OM3    Carotid artery disease     Status post carotid endarterectomy - [facility] 4/10/17: 50-59% NICHELLE, 1-15% LICA.     Colonic polyp     Cyst of pancreas     DDD (degenerative disc disease), lumbosacral     GERD (gastroesophageal reflux disease)     H/O bone density study 2013    H/O complete eye exam 2014    History of kidney stone     HLD (hyperlipidemia)     Hypertension     Kidney stone     8/22/22    Lipid screening 05/31/2013    Low back pain     physical therapy [facility] 5-12-10    Screening for prostate cancer 07/07/2015    Skin cancer     nose    Stroke     RESIDUAL--\"BALANCE ISSUES\"        Past Surgical History:   Procedure Laterality Date    CARDIAC CATHETERIZATION N/A 04/10/2017    Procedure: Left Heart Cath;  Surgeon: Marjorie Healy MD;  Location: [facility] CATH INVASIVE LOCATION;  Service:     CARDIAC CATHETERIZATION N/A 04/10/2017    Procedure: Coronary angiography;  Surgeon: Marjorie Healy MD;  [facility];  Service:     CARDIAC CATHETERIZATION N/A 04/10/2017    Procedure: Left ventriculography;  Surgeon: Marjorie Healy MD;  Location: [facility] CATH INVASIVE LOCATION;  Service:     CARDIAC CATHETERIZATION  2011    CARDIAC ELECTROPHYSIOLOGY PROCEDURE N/A 04/18/2017    Procedure: Ablation atrial flutter;  Surgeon: Jose Antonio Gustafson MD;  Location: [facility] CATH INVASIVE LOCATION;  Service:     CAROTID ENDARTERECTOMY      CHOLECYSTECTOMY      CHOLECYSTECTOMY WITH INTRAOPERATIVE CHOLANGIOGRAM N/A 09/07/2019    Procedure: Laparoscopic cholecystectomy with intraoperative cholangiogram;  Surgeon: Gauri Travis MD;  Location: [facility] MAIN OR;  Service: General    COLONOSCOPY  01/06/2015    Diverticulosis, one TA    COLONOSCOPY N/A 02/14/2019    tics, NBIH, adenomatous polyp x 2    COLONOSCOPY N/A 11/05/2021    Procedure: COLONOSCOPY TO CECUM AND TERM. ILEUM WITH COLD POLYPECTOMIES;  Surgeon: Everton Abel MD;  Location: [facility] ENDOSCOPY;  Service: Gastroenterology;  Laterality: " N/A;  PRE OP - PERS H/O POLYPS  POST OP - COLON POLYPS,, DIVERTICULOSIS, HEMORRHOIDS    CORONARY ARTERY BYPASS GRAFT N/A 04/11/2017    Procedure: AR STERNOTOMY CORONARY ARTERY BYPASS GRAFT TIMES 3 USING LEFT INTERNAL MAMMARY ARTERY AND LEFT GREATER SAPHENOUS VEIN GRAFT PER ENDOSCOPIC VEIN HARVESTING AND PRP ;  Surgeon: Temo Cortez MD;  Location: [facility] MAIN OR;  Service:     ENDOSCOPY  01/06/2015    HH, Ervin's esophagus    ENDOSCOPY N/A 02/14/2019    Z line irregular, HH, Ervin's esophagus    ENDOSCOPY N/A 11/05/2021    Procedure: ESOPHAGOGASTRODUODENOSCOPY WITH BIOPSIES;  Surgeon: Everton Abel MD;  Location:  [facility];  Service: Gastroenterology;  Laterality: N/A;  PRE OP - PERS H/O ERVIN'S  POST OP - IRREG Z LINE    ENDOSCOPY W/ PEG TUBE PLACEMENT N/A 11/6/2023    Procedure: ESOPHAGOGASTRODUODENOSCOPY WITH PERCUTANEOUS ENDOSCOPIC GASTROSTOMY TUBE INSERTION;  Surgeon: Temo Ramsey MD;  [facility];  Service: General;  Laterality: N/A;  Pre: dysphagia  Post: same    KNEE SURGERY Left     URETEROSCOPY LASER LITHOTRIPSY WITH STENT INSERTION Left 8/23/2022    Procedure: Cysto retrograde with left uretro stent placement;  Surgeon: Damien Oliveira MD;  Location: [facility] MAIN OR;  Service: Urology;  Laterality: Left;    VASECTOMY          No current facility-administered medications on file prior to encounter.     Current Outpatient Medications on File Prior to Encounter   Medication Sig Dispense Refill    amitriptyline (ELAVIL) 50 MG tablet TAKE ONE TABLET BY MOUTH ONCE NIGHTLY 30 tablet 3    amLODIPine (NORVASC) 5 MG tablet Take 1 tablet by mouth Daily.      apixaban (ELIQUIS) 5 MG tablet tablet Take 1 tablet by mouth 2 (Two) Times a Day. 90 tablet 3    furosemide (LASIX) 20 MG tablet TAKE ONE TABLET BY MOUTH DAILY 7 tablet 1    lansoprazole (PREVACID SOLUTAB) 30 MG Tablet Delayed Release Dispersible disintegrating tablet 1 tablet by Nasogastric route Every  Morning Before Breakfast.      oxyBUTYnin (DITROPAN) 5 MG/5ML solution oral solution Administer 5 mL per G tube 2 (Two) Times a Day.      rosuvastatin (Crestor) 20 MG tablet Take 1 tablet by mouth Daily. 90 tablet 1    sucralfate (CARAFATE) 1 g tablet Take 1 tablet by mouth 2 (Two) Times a Day Before Meals.      guaifenesin (ROBITUSSIN) 100 MG/5ML liquid Take 10 mL by mouth Every 4 (Four) Hours As Needed for Cough.      ipratropium-albuterol (DUO-NEB) 0.5-2.5 mg/3 ml nebulizer Take 3 mL by nebulization Every 4 (Four) Hours As Needed for Shortness of Air.      mupirocin (BACTROBAN) 2 % cream Apply 1 application topically to the appropriate area as directed 2 (Two) Times a Day. 15 g 0    naloxone (NARCAN) 4 MG/0.1ML nasal spray Call 911. Don't prime. [place] in 1 nostril for overdose. Repeat in 2-3 minutes in other nostril if no or minimal breathing/responsiveness. 2 each 0        ALLERGIES:    Allergies   Allergen Reactions    Atorvastatin Myalgia     Myalgia    Penicillins Hives, Swelling and Rash     Tolerates cephalosporins         Social History     Socioeconomic History    Marital status:      Spouse name: Brooke    Years of education: 9th grade   Tobacco Use    Smoking status: Former     Packs/day: 1     Types: Cigarettes     Start date: 1959     Quit date: 2009     Years since quittin.9    Smokeless tobacco: Never    Tobacco comments:     CAFFEINE USE   Vaping Use    Vaping Use: Never used   Substance and Sexual Activity    Alcohol use: Not Currently     Comment: rarely    Drug use: No    Sexual activity: Defer     Partners: Female        Family History   Problem Relation Age of Onset    Hypertension Mother     Heart disease Mother     Heart attack Mother     Stroke Mother     Heart disease Father     Heart attack Father     Stroke Father     Hypertension Sister     Heart attack Brother     Heart disease Brother     No Known Problems Brother     Heart disease Brother     Heart attack Brother      Diabetes Brother     No Known Problems Maternal Grandmother     No Known Problems Maternal Grandfather     No Known Problems Paternal Grandmother     No Known Problems Paternal Grandfather     Pancreatic cancer Paternal Cousin     Arthritis Other     Diabetes Other     Hypertension Other     Kidney disease Other         stones    Malig Hyperthermia Neg Hx         Review of Systems   Constitutional:  Positive for activity change and fatigue.   Gastrointestinal:  Negative for blood in stool.   Skin:  Positive for wound (Decubitus).   Neurological:  Positive for weakness.        Objective     Vitals:    11/23/23 1510 11/23/23 2002 11/23/23 2335 11/24/23 0735   BP:  127/60 136/64 130/72   BP Location:  Left arm Left arm Left arm   Patient Position:  Lying Lying Lying   Pulse: 95 88 100 91   Resp:  18 18 18   Temp:  98.1 °F (36.7 °C) 98.2 °F (36.8 °C) 99 °F (37.2 °C)   TempSrc:  Oral Oral Oral   SpO2: 95% 97% 94% 97%   Weight:       Height:             10/20/2023     1:51 PM   Current Status   ECOG score 0       Physical Exam  [unfilled]     This patient's ACP documentation is up to date, and there's nothing further left to document.      CONSTITUTIONAL:  Vital signs reviewed.  No distress, looks comfortable.  Very weak and has trouble sitting up without help  EYES:  Conjunctivae and lids unremarkable.  PERRLA  GASTROINTESTINAL: Abdomen appears unremarkable.  Nontender.  No hepatomegaly.  No splenomegaly.  LYMPHATIC:  No cervical, supraclavicular, axillary lymphadenopathy.  SKIN:  Warm.  No rashes.  PSYCHIATRIC:    Appears depressed  RECENT LABS:  Hematology WBC   Date Value Ref Range Status   11/24/2023 8.98 3.40 - 10.80 10*3/mm3 Final     RBC   Date Value Ref Range Status   11/24/2023 2.63 (L) 4.14 - 5.80 10*6/mm3 Final     Hemoglobin   Date Value Ref Range Status   11/24/2023 8.5 (L) 13.0 - 17.7 g/dL Final     Hematocrit   Date Value Ref Range Status   11/24/2023 26.9 (L) 37.5 - 51.0 % Final     Platelets    Date Value Ref Range Status   11/24/2023 443 140 - 450 10*3/mm3 Final          Assessment & Plan     *Hospitalized in mid October to early November with COVID-pneumonia with profound deconditioning discharged to nursing home  Readmitted 11/22/2023 with anemia-patient has not been on hydroxyurea at discharge from the hospital and remains on Eliquis  MCV is dropped significantly suggesting iron depletion and blood loss  Patient himself denies hematochezia or melena  B12 folate normal ferritin 566 iron saturation 12% reticulocyte count 9% rule out hemolysis although acute blood loss can also cause an elevated reticulocyte    *Polycythemia vera  The patient now presents for thrombocytosis with review of his record over the last year demonstrating a platelet count that is escalated from 3-400,000 now to 8/11/2022 648,000 but concurrent microcytic and hypochromic indices.  These indices have been slowly reducing over the last 2 years approximately followed more closely.  Concurrently is a mild drop in his baseline hemoglobin still well within normal limits.  thrombocytosis thought, initially, to be related to iron deficiency.  Ferritin found to be 16.3 and iron of 26 with 5% saturation and TIBC 511.   The patient was placed on ferrous gluconate which, unfortunately, he was unable to tolerate with worsening constipation.  He had further issues in early September with left renal stone, requiring cystoscopy, stent placement 8/23/2022.  9/28/2022 with repeat studies performed 9/21 demonstrating 5% iron saturation, ferritin of 12.5.  He remains further weight and fatigue, again oral iron intolerant and will require IV iron preparations for his iron deficiency anemia.  We discontinued oral iron and proceeded with Injectafer given 9/28/2022 in 10/5/2022  4/17/2023JAK  2-V617 F mutation having been detected.    In the interval he was admitted 2/19-21/23 for weakness, fall and ER evaluation not demonstrate any acute intracranial  process, CT of lumbar spine with lumbar degenerative disease and other studies not show any acute abnormalities.  Fortunately he went on to improve though his wife had a positive COVID test recently on home examination.  4/10/2023 include an H&H of 18.3 and 60.9 white count of 14,700 platelet count of 477,000.  5/15/2023 hemoglobin 15.3, hematocrit 50.4.  Reviewed with Dr. Lopez plans to hold off on phlebotomy and initiate Hydrea 1000 mg daily.  We will tentatively schedule him for return follow-up visit in 2 weeks with repeat labs and reassessment.  6/1/2020 2:23 weeks of Hydrea 1000 mg daily, WBC 5.0, hemoglobin 15.2, hematocrit 49.8, platelets 113,000.  Hydrea was reduced to 500 mg daily  Stability of counts today, 6/15/2023 on 500 mg daily with WBC 4.81, hemoglobin 14.6, hematocrit 46.7%, platelets 156,000  Patient seen 6/30/2023 with H&H of 14.1 and 46.6 white count of 4800 and platelet count of 1 46,000.  Patient subsequently assessed including 9/21/2023 with H&H gradually dropping 11.1 and 32.9, white count of 5110, platelet count 151,000, MCV not 110.8.  10/20/2023 WBC 8.04, SNC 5.42, Hgb 13.1, Platelets 247,000.  Continue Hydrea 500 mg every other day.    *Hospitalized with COVID-pneumonia and severe deconditioning in 10/23 discharged to nursing home on 11/9/2023 off Hydrea but on Eliquis     PLAN:  Check iron studies and rule out bleeding and hemolysis  Continue to hold hydroxyurea  Agree with holding Eliquis  4.  Daily CBC and retake and transfuse for hemoglobin less than 8  Will follow thank for this consult

## 2023-11-24 NOTE — PLAN OF CARE
Goal Outcome Evaluation:  Plan of Care Reviewed With: patient, daughter           Outcome Evaluation: Pt is a 76 y/o M admitted from [facility] with weakness and anemia. Pt recently with recent prolonged hospital stay d/t COVID-19, prior to this was fully independent. States he was working on TFs with RW atrehab, needing assist with ADLs, now with PEG. Presents today with global weakness and poor endurance. He req's maxA to sit EOB, total A for LB ADLs and toileting, ModA for grooming and UB ADLs using clinical judgement based on current level of function. Pt stands at the bedside 2x trials in prep for ADL TFs, MaxA and increased time to stand fully with use of RW. Maintains stand ~5 seconds before fatiguing and requires seated rest break. Asssted back to supine and left with all needs in reach. OT will cont to follow to address deficits, recommeding return to rehab

## 2023-11-24 NOTE — THERAPY EVALUATION
Patient Name: Madhu Santoro  : 1945    MRN: [id number]                              Today's Date: 2023       Admit Date: 2023    Visit Dx:     ICD-10-CM ICD-9-CM   1. Weakness  R53.1 780.79   2. Elevated troponin  R79.89 790.6   3. Hypoxia  R09.02 799.02     Patient Active Problem List   Diagnosis    Anxiety    Arthritis    Coronary artery disease due to lipid rich plaque    HLD (hyperlipidemia)    Benign essential hypertension    DDD (degenerative disc disease), lumbosacral    Cerebrovascular accident    Encounter for screening for cardiovascular disorders    Gastroesophageal reflux disease    Hyperlipidemia    Stenosis of carotid artery    Former smoker    History of cardiac catheterization    S/P ablation of atrial flutter    Typical atrial flutter    S/P CABG (coronary artery bypass graft)    Adenomatous polyp of ascending colon    Abdominal bloating    Stroke    PAD (peripheral artery disease)    Acute cholecystitis    Right upper quadrant abdominal pain    Chronic pain of both hips    Chronic bilateral low back pain without sciatica    Sacroiliac joint dysfunction of both sides    Encounter for long-term (current) use of high-risk medication    Lumbar facet arthropathy    Stroke-like symptoms    CVA (cerebral vascular accident)    Personal history of colonic polyps    Arthralgia of multiple joints    Iron deficiency    Thrombocytosis    Left ureteral stone    Obstructive uropathy    Chronic anticoagulation    Facial skin lesion    Iron deficiency anemia    Polycythemia    Neuropathy    Weakness    Pneumonia    Close exposure to COVID-19 virus    Polycythemia vera    Chronic gout without tophus    COVID-19    Cytokine release syndrome, grade 2    Anemia    History of CVA (cerebrovascular accident)    S/P percutaneous endoscopic gastrostomy (PEG) tube placement    Mandel esophagus    Elevated troponin    Leukocytosis    Sacral decubitus ulcer    Wound infection     Past Medical History:  "  Diagnosis Date    Anxiety     Arthritis     Atrial flutter     Status post cavotricuspid isthmus ablation by Dr. Gustafson on 4/18/17    Mandel esophagus     Benign essential hypertension     CAD (coronary artery disease)     3 vessel CABG 4/11/17 by Dr. Cortez: ROSARIO-prox LAD, SVG-OM1, SVG-OM3    Carotid artery disease     Status post carotid endarterectomy - USG 4/10/17: 50-59% NICHELLE, 1-15% LICA.     Colonic polyp     Cyst of pancreas     DDD (degenerative disc disease), lumbosacral     GERD (gastroesophageal reflux disease)     H/O bone density study 2013    H/O complete eye exam 2014    History of kidney stone     HLD (hyperlipidemia)     Hypertension     Kidney stone     8/22/22    Lipid screening 05/31/2013    Low back pain     physical therapy [facility] 5-12-10    Screening for prostate cancer 07/07/2015    Skin cancer     nose    Stroke     RESIDUAL--\"BALANCE ISSUES\"     Past Surgical History:   Procedure Laterality Date    CARDIAC CATHETERIZATION N/A 04/10/2017    Procedure: Left Heart Cath;  Surgeon: Marjorie Healy MD;  Location: [facility] CATH INVASIVE LOCATION;  Service:     CARDIAC CATHETERIZATION N/A 04/10/2017    Procedure: Coronary angiography;  Surgeon: Marjorie Healy MD;  Location:  [facility] CATH INVASIVE LOCATION;  Service:     CARDIAC CATHETERIZATION N/A 04/10/2017    Procedure: Left ventriculography;  Surgeon: Marjorie Healy MD;  [facility];  Service:     CARDIAC CATHETERIZATION  2011    CARDIAC ELECTROPHYSIOLOGY PROCEDURE N/A 04/18/2017    Procedure: Ablation atrial flutter;  Surgeon: Jose Antonio Gustafson MD;  [facility];  Service:     CAROTID ENDARTERECTOMY      CHOLECYSTECTOMY      CHOLECYSTECTOMY WITH INTRAOPERATIVE CHOLANGIOGRAM N/A 09/07/2019    Procedure: Laparoscopic cholecystectomy with intraoperative cholangiogram;  Surgeon: Gauri Travis MD;  Location: [facility] MAIN OR;  Service: General    COLONOSCOPY  01/06/2015    " Diverticulosis, one TA    COLONOSCOPY N/A 02/14/2019    tics, NBIH, adenomatous polyp x 2    COLONOSCOPY N/A 11/05/2021    Procedure: COLONOSCOPY TO CECUM AND TERM. ILEUM WITH COLD POLYPECTOMIES;  Surgeon: Everton Abel MD;  [facility];  Service: Gastroenterology;  Laterality: N/A;  PRE OP - PERS H/O POLYPS  POST OP - COLON POLYPS,, DIVERTICULOSIS, HEMORRHOIDS    CORONARY ARTERY BYPASS GRAFT N/A 04/11/2017    Procedure: AR STERNOTOMY CORONARY ARTERY BYPASS GRAFT TIMES 3 USING LEFT INTERNAL MAMMARY ARTERY AND LEFT GREATER SAPHENOUS VEIN GRAFT PER ENDOSCOPIC VEIN HARVESTING AND PRP ;  Surgeon: Temo Cortez MD;  Location: [facility] MAIN OR;  Service:     ENDOSCOPY  01/06/2015    HH, Ervin's esophagus    ENDOSCOPY N/A 02/14/2019    Z line irregular, HH, Ervin's esophagus    ENDOSCOPY N/A 11/05/2021    Procedure: ESOPHAGOGASTRODUODENOSCOPY WITH BIOPSIES;  Surgeon: Everton Abel MD;  [facility];  Service: Gastroenterology;  Laterality: N/A;  PRE OP - PERS H/O ERVIN'S  POST OP - IRREG Z LINE    ENDOSCOPY W/ PEG TUBE PLACEMENT N/A 11/6/2023    Procedure: ESOPHAGOGASTRODUODENOSCOPY WITH PERCUTANEOUS ENDOSCOPIC GASTROSTOMY TUBE INSERTION;  Surgeon: Temo Ramsey MD;  [facility];  Service: General;  Laterality: N/A;  Pre: dysphagia  Post: same    KNEE SURGERY Left     URETEROSCOPY LASER LITHOTRIPSY WITH STENT INSERTION Left 8/23/2022    Procedure: Cysto retrograde with left uretro stent placement;  Surgeon: Damien Oliveira MD;  Location: [facility] MAIN OR;  Service: Urology;  Laterality: Left;    VASECTOMY        General Information       Row Name 11/24/23 0920          Physical Therapy Time and Intention    Document Type evaluation  -EE     Mode of Treatment individual therapy;physical therapy  -EE       Row Name 11/24/23 0920          General Information    Existing Precautions/Restrictions fall;NPO  PEG  -EE     Barriers to Rehab previous  functional deficit  -EE       Row Name 11/24/23 0920          Living Environment    People in Home other (see comments)  admitted from SNF (subacute rehab), but lives at home with spouse at baseline  -EE       Row Name 11/24/23 0920          Cognition    Orientation Status (Cognition) oriented x 3  -EE       Row Name 11/24/23 0920          Safety Issues, Functional Mobility    Impairments Affecting Function (Mobility) balance;endurance/activity tolerance;postural/trunk control;pain;range of motion (ROM);strength  -EE               User Key  (r) = Recorded By, (t) = Taken By, (c) = Cosigned By      Initials Name Provider Type    EE Tawana Weeks, SHANELL Physical Therapist                   Mobility       Row Name 11/24/23 0920          Bed Mobility    Bed Mobility supine-sit;sit-supine  -EE     Supine-Sit [place] (Bed Mobility) maximum assist (25% patient effort);verbal cues;1 person assist  -EE     Sit-Supine [place] (Bed Mobility) maximum assist (25% patient effort);1 person assist;verbal cues  -EE     Assistive Device (Bed Mobility) bed rails;draw sheet  -EE     Comment, (Bed Mobility) increased time required for sequencing, assist required with scooting to EOB  -EE       Row Name 11/24/23 0920          Sit-Stand Transfer    Sit-Stand [place] (Transfers) maximum assist (25% patient effort);1 person assist;verbal cues  -EE     Assistive Device (Sit-Stand Transfers) walker, front-wheeled  -EE     Comment, (Sit-Stand Transfer) STS x2 from elevated bed surface, cues for hand placement and upright posture  -EE       Row Name 11/24/23 0920          Gait/Stairs (Locomotion)    [place] Level (Gait) unable to assess  too fatigued after static standing  -EE               User Key  (r) = Recorded By, (t) = Taken By, (c) = Cosigned By      Initials Name Provider Type    EE Tawana Weeks, SHANELL Physical Therapist                   Obj/Interventions       Row Name 11/24/23 0921          Range of Motion Comprehensive     General Range of Motion lower extremity range of motion deficits identified  -EE     Comment, General Range of Motion B knees lacking terminal extension, B ankle DF limited to neutral  -EE       Row Name 11/24/23 0921          Strength Comprehensive (MMT)    General Manual Muscle Testing (MMT) Assessment lower extremity strength deficits identified  -EE     Comment, General Manual Muscle Testing (MMT) Assessment B LEs grossly 3-/5 within available ROM  -EE       Row Name 11/24/23 0921          Motor Skills    Therapeutic Exercise other (see comments)  Supine B ankle pumps x10 reps. Supine B heel slides, seated B LAQ, B shoulder flexion x5 reps.  -EE       Row Name 11/24/23 0921          Balance    Balance Assessment sitting static balance;standing static balance  -EE     Static Sitting Balance minimal assist;standby assist  -EE     Position, Sitting Balance sitting edge of bed  -EE     Static Standing Balance minimal assist  -EE     Position/Device Used, Standing Balance supported;walker, front-wheeled  -EE     Comment, Balance Static sitting EOB x6 min, initially leaning posteriorly and required min A for balance; progressed to sitting with SBA with vc's and tc's. Stands with forward flexion and B knee flexion, cues for upright posture. Stood x2 trials; 1st trial 30s, 2nd trial 5s.  -EE       Row Name 11/24/23 0921          Sensory Assessment (Somatosensory)    Sensory Assessment (Somatosensory) not tested  -EE               User Key  (r) = Recorded By, (t) = Taken By, (c) = Cosigned By      Initials Name Provider Type    EE Tawana Weeks, PT Physical Therapist                   Goals/Plan       Row Name 11/24/23 0926          Bed Mobility Goal 1 (PT)    Activity/Assistive Device (Bed Mobility Goal 1, PT) sit to supine/supine to sit  -EE     [place] Level/Cues Needed (Bed Mobility Goal 1, PT) minimum assist (75% or more patient effort)  -EE     Time Frame (Bed Mobility Goal 1, PT) 2 weeks  -EE      Progress/Outcomes (Bed Mobility Goal 1, PT) goal ongoing  -EE       Row Name 11/24/23 0926          Transfer Goal 1 (PT)    Activity/Assistive Device (Transfer Goal 1, PT) sit-to-stand/stand-to-sit;bed-to-chair/chair-to-bed;walker, rolling  -EE     [place] Level/Cues Needed (Transfer Goal 1, PT) minimum assist (75% or more patient effort)  -EE     Time Frame (Transfer Goal 1, PT) 2 weeks  -EE     Progress/Outcome (Transfer Goal 1, PT) goal ongoing  -EE       Row Name 11/24/23 0926          Gait Training Goal 1 (PT)    Activity/Assistive Device (Gait Training Goal 1, PT) gait (walking locomotion);walker, rolling  -EE     [place] Level (Gait Training Goal 1, PT) minimum assist (75% or more patient effort)  -EE     Distance (Gait Training Goal 1, PT) 25'  -EE     Time Frame (Gait Training Goal 1, PT) 2 weeks  -EE     Progress/Outcome (Gait Training Goal 1, PT) goal ongoing  -EE       Row Name 11/24/23 0926          Therapy Assessment/Plan (PT)    Planned Therapy Interventions (PT) balance training;bed mobility training;gait training;home exercise program;strengthening;stretching;patient/family education;ROM (range of motion);postural re-education;transfer training  -EE               User Key  (r) = Recorded By, (t) = Taken By, (c) = Cosigned By      Initials Name Provider Type    EE Tawana Weeks, PT Physical Therapist                   Clinical Impression       Row Name 11/24/23 0923          Pain    Pretreatment Pain Rating 6/10  -EE     Posttreatment Pain Rating 6/10  -EE     Pain Location lower  -EE     Pain Location - back  -EE     Pain Intervention(s) Repositioned;Rest  -EE       Row Name 11/24/23 0923          Plan of Care Review    Plan of Care Reviewed With patient;daughter  -EE     Outcome Evaluation Pt is a 78 yo male who presents from SNF (subacute rehab) with weakness and progressive anemia. Pt with current decubitus ulcer, also with PMH of CVA, CABG, chronic low back pain. Prior to admission, pt  was working with PT at SNF and states he was standing and transferring with rwx and assist. Upon exam, pt demonstrates generalized weakness, impaired LE ROM, impaired posture, impaired trunk control, and decreased endurance limiting mobility. Pt required max A with bed mobility and max A to stand at EOB with rwx. Pt completed supine and seated ther ex with cues. Pt is at increased risk of falls and will continue to benefit from PT to address impairments and increase independence with functional mobility. Recommend DC to SNF for ongoing therapy once medically stable.  -EE       Row Name 11/24/23 0923          Therapy Assessment/Plan (PT)    Rehab Potential (PT) fair, will monitor progress closely  -EE     Criteria for Skilled Interventions Met (PT) yes;meets criteria;skilled treatment is necessary  -EE     Therapy Frequency (PT) 5 times/wk  -EE       Row Name 11/24/23 0923          Positioning and Restraints    Pre-Treatment Position in bed  -EE     Post Treatment Position bed  -EE     In Bed supine;call light within reach;encouraged to call for assist;exit alarm on;with nsg;with family/caregiver  -EE               User Key  (r) = Recorded By, (t) = Taken By, (c) = Cosigned By      Initials Name Provider Type    EE Tawana Weeks, PT Physical Therapist                   Outcome Measures       Row Name 11/24/23 0927          How much help from another person do you currently need...    Turning from your back to your side while in flat bed without using bedrails? 2  -EE     Moving from lying on back to sitting on the side of a flat bed without bedrails? 2  -EE     Moving to and from a bed to a chair (including a wheelchair)? 2  -EE     Standing up from a chair using your arms (e.g., wheelchair, bedside chair)? 2  -EE     Climbing 3-5 steps with a railing? 1  -EE     To walk in hospital room? 1  -EE     AM-PAC 6 Clicks Score (PT) 10  -EE     Highest Level of Mobility Goal 4 --> Transfer to chair/commode  -EE                User Key  (r) = Recorded By, (t) = Taken By, (c) = Cosigned By      Initials Name Provider Type    EE Tawana Weeks, SHANELL Physical Therapist                                 Physical Therapy Education       Title: PT OT SLP Therapies (Done)       Topic: Physical Therapy (Done)       Point: Mobility training (Done)       Learning Progress Summary             Patient Acceptance, E,D, VU,NR by  at 11/24/2023 0927    Acceptance, E,TB, VU by KW at 11/22/2023 1748                         Point: Home exercise program (Done)       Learning Progress Summary             Patient Acceptance, E,D, VU,NR by EE at 11/24/2023 0927    Acceptance, E,TB, VU by KW at 11/22/2023 1748                         Point: Body mechanics (Done)       Learning Progress Summary             Patient Acceptance, E,D, VU,NR by  at 11/24/2023 0927    Acceptance, E,TB, VU by KW at 11/22/2023 1748                         Point: Precautions (Done)       Learning Progress Summary             Patient Acceptance, E,TB, VU by  at 11/22/2023 1748                                         User Key       Initials Effective Dates Name Provider Type Discipline     06/16/21 -  Tawana Weeks, SHANELL Physical Therapist PT    KW 10/17/23 -  Nancy Garcia, RN Registered Nurse Nurse                  PT Recommendation and Plan  Planned Therapy Interventions (PT): balance training, bed mobility training, gait training, home exercise program, strengthening, stretching, patient/family education, ROM (range of motion), postural re-education, transfer training  Plan of Care Reviewed With: patient, daughter  Outcome Evaluation: Pt is a 78 yo male who presents from SNF (subacute rehab) with weakness and progressive anemia. Pt with current decubitus ulcer, also with PMH of CVA, CABG, chronic low back pain. Prior to admission, pt was working with PT at [facility] and states he was standing and transferring with rwx and assist. Upon exam, pt demonstrates generalized weakness, impaired LE  ROM, impaired posture, impaired trunk control, and decreased endurance limiting mobility. Pt required max A with bed mobility and max A to stand at EOB with rwx. Pt completed supine and seated ther ex with cues. Pt is at increased risk of falls and will continue to benefit from PT to address impairments and increase independence with functional mobility. Recommend DC to SNF for ongoing therapy once medically stable.     Time Calculation:         PT Charges       Row Name 11/24/23 0927             Time Calculation    Start Time 0849  -EE      Stop Time 0914  -EE      Time Calculation (min) 25 min  -EE      PT Received On 11/24/23  -EE      PT - Next Appointment 11/27/23  -EE      PT Goal Re-Cert Due Date 12/01/23  -EE         Time Calculation- PT    Total Timed Code Minutes- PT 15 minute(s)  -EE         Timed Charges    60192 - PT Therapeutic Exercise Minutes 9  -EE      16018 - PT Therapeutic Activity Minutes 6  -EE         Total Minutes    Timed Charges Total Minutes 15  -EE       Total Minutes 15  -EE                User Key  (r) = Recorded By, (t) = Taken By, (c) = Cosigned By      Initials Name Provider Type    EE Tawana Weeks, PT Physical Therapist                  Therapy Charges for Today       Code Description Service Date Service Provider Modifiers Qty    82771971993 HC PT THER PROC EA 15 MIN 11/24/2023 Tawana Weeks, PT GP 1    04634087707 HC PT EVAL MOD COMPLEXITY 3 11/24/2023 Tawana Weeks, PT GP 1            PT G-Codes  AM-PAC 6 Clicks Score (PT): 10  PT Discharge Summary  Anticipated Discharge Disposition (PT): skilled nursing facility    Tawana Weeks PT  11/24/2023

## 2023-11-24 NOTE — CASE MANAGEMENT/SOCIAL WORK
Discharge Planning Assessment  [facility]     Patient Name: Madhu Santoro  MRN: [id number]  Today's Date: 11/24/2023    Admit Date: 11/22/2023    Plan: [place] [facility] [place] [facility].   Discharge Needs Assessment       Row Name 11/24/23 1145       Living Environment    People in Home spouse    Current Living Arrangements home    Potentially Unsafe Housing Conditions none    Primary Care Provided by self    Family Caregiver if Needed spouse    Quality of Family Relationships involved;helpful    Able to Return to Prior Arrangements no    Living Arrangement Comments Too weak       Resource/Environmental Concerns    Resource/Environmental Concerns none       Transition Planning    Patient/Family Anticipates Transition to inpatient rehabilitation facility    Patient/Family Anticipated Services at Transition rehabilitation services;skilled nursing    Transportation Anticipated family or friend will provide;other (see comments)       Discharge Needs Assessment    Equipment Currently Used at Home none    Concerns to be Addressed adjustment to diagnosis/illness    Concerns Comments Admitted from [facility] recently admitted there, plans to return.    Anticipated Changes Related to Illness inability to care for self    Equipment Needed After Discharge rollator                   Discharge Plan       Row Name 11/24/23 1146       Plan    Plan Return to [facility] at [facility] pending bed availability.    Patient/Family in Agreement with Plan yes    Plan Comments Spoke with pt and dtr Priyanka at bedside, introduced self and explained CCP role, and confirmed pharmacy and face sheet information verified. He asked to add Priyanka to contact list, completed. Pt lives with wife Brooke in 1 level home with 2 steps to enter. He is normally IADL's, uses no medical equipment and has no HH history. He was recently discharged to [facility] and plans to return there at [place]. States he would like a  jez, explained that CCP unable to supply prior to SNF stay but would make record of need. Spoke with Yasmin/Cristino, pt can return to [facility] pending bed availability at [place]. She is following for return. Pt will likely need transport arranged. CCP will follow -Trang MACHUCA                  Continued Care and Services - Admitted Since 11/22/2023       Destination       Service Provider Request Status Selected Services Address Phone Fax Patient Preferred    [facility] Pending - Request Sent N/A [address] [address] [phone] --                  Selected Continued Care - Episodes Includes continued care and service providers with selected services from the active episodes listed below      Oncology- External Fill Episode start date: 5/16/2023 (Paused)   There are no active outsourced providers for this episode.                 Selected Continued Care - Prior Encounters Includes continued care and service providers with selected services from prior encounters from 8/24/2023 to 11/24/2023      Discharged on 11/9/2023 Admission date: 10/23/2023 - Discharge disposition: Skilled Nursing Facility (DC - External)      Destination       Service Provider Selected Services Address Phone Fax Patient Preferred    [facility] [address] [address] [phone] --                          Expected Discharge Date and Time       Expected Discharge Date Expected Discharge Time    Nov 27, 2023            Demographic Summary       Row Name 11/24/23 1145       General Information    Admission Type inpatient                   Functional Status       Row Name 11/24/23 1145       Functional Status    Usual Activity Tolerance excellent    Current Activity Tolerance moderate       Assessment of Health Literacy    Health Literacy Good       Functional Status, IADL    Medications independent    Meal Preparation  independent    Housekeeping independent    Laundry independent    Shopping independent       Mental Status    General Appearance WDL WDL       Mental Status Summary    Recent Changes in Mental Status/Cognitive Functioning no changes                   Psychosocial    No documentation.                  Abuse/Neglect    No documentation.                  Legal       Row Name 11/24/23 1145       Financial/Legal    Who Manages Finances if Patient Unable wife                   Substance Abuse    No documentation.                  Patient Forms    No documentation.                     Trang Carter RN

## 2023-11-24 NOTE — CONSULTS
Referring Provider: Tadeo Handy MD  [address] [address]  Reason for Consultation: Positive wound culture    Subjective   History of present illness: This is a 77-year-old male with a history of coronary artery disease status post CABG hypertension hyperlipidemia stroke and peripheral arterial disease who was admitted on November 22 with anemia.  Patient was last admitted to the hospital from October 23 through November 9 after a fall.  He was found to have COVID-19 infection i.  His hospital course was complicated by aspiration pneumonia for which she received antibiotics.  He was also noted to have significant oropharyngeal dysphagia and a PEG tube was placed on November 6.  He presented back to the emergency room on November 22 with a serum hemoglobin of 7.3 and concerns for hypoxia.  Admission labs revealed white blood cell count of 15,000 with a procalcitonin of 0.67.  The patient states he did not have a wound on his bottom prior to his last hospital admission.  He does report pain in the area.    Past Medical History:   Diagnosis Date    Anxiety     Arthritis     Atrial flutter     Mandel esophagus     CAD (coronary artery disease)     3 vessel CABG 4/11/17 by Dr. Cortez: ROSARIO-prox LAD, SVG-OM1, SVG-OM3    DDD (degenerative disc disease), lumbosacral     GERD (gastroesophageal reflux disease)     History of kidney stone     HLD (hyperlipidemia)     Hypertension     Kidney stone     Stroke    Peripheral arterial disease    Past Surgical History:   Procedure Laterality Date    CAROTID ENDARTERECTOMY      CHOLECYSTECTOMY      CHOLECYSTECTOMY WITH INTRAOPERATIVE CHOLANGIOGRAM N/A 09/07/2019    CORONARY ARTERY BYPASS GRAFT N/A 04/11/2017    KNEE SURGERY Left         reports that he quit smoking about 14 years ago. His smoking use included cigarettes. He started smoking about 64 years ago. He smoked an average of 1 pack per day. He has never used smokeless tobacco. He reports that he  does not currently use alcohol. He reports that he does not use drugs.    family history includes Arthritis in an other family member; Diabetes in his brother and another family member; Heart attack in his brother, brother, father, and mother; Heart disease in his brother, brother, father, and mother; Hypertension in his mother, sister, and another family member; Kidney disease in an other family member; No Known Problems in his brother, maternal grandfather, maternal grandmother, paternal grandfather, and paternal grandmother; Pancreatic cancer in his paternal cousin; Stroke in his father and mother.    Allergies   Allergen Reactions    Atorvastatin Myalgia     Myalgia    Penicillins Hives, Swelling and Rash     Tolerates cephalosporins        Medication:  Antibiotics:  none  Please refer to the medical record for a full medication list    Review of Systems  Pertinent items are noted in HPI, all other systems reviewed and negative    Objective   Vital Signs   Temp:  [97.7 °F (36.5 °C)-99 °F (37.2 °C)] 97.7 °F (36.5 °C)  Heart Rate:  [] 86  Resp:  [18] 18  BP: (127-136)/(60-72) 135/65    Physical Exam:   General: Frail-appearing  HEENT: Normocephalic, atraumatic, no scleral icterus.   Neck: Supple, trachea is midline  Cardiovascular: Normal rate, regular rhythm, normal S1 and S2, no murmurs, rubs, or gallops   Respiratory: Lungs are clear to auscultation bilaterally,   GI: Abdomen is soft, nontender, nondistended, positive bowel sounds bilaterally,  Musculoskeletal:  no edema, tenderness or deformity  Skin: No rashes rash or injury to the coccyx no purulence no induration  Extremities: No E/C/C  Neurological: Alert and oriented, moving all 4 extremities  Psychiatric: Normal mood and affect     Results Review:   I reviewed the patient's new clinical results.  I reviewed the patient's new imaging results and agree with the interpretation.    Lab Results   Component Value Date    WBC 8.98 11/24/2023    HGB 8.5  (L) 11/24/2023    HCT 26.9 (L) 11/24/2023    .3 (H) 11/24/2023     11/24/2023       Lab Results   Component Value Date    GLUCOSE 101 (H) 11/24/2023    BUN 18 11/24/2023    CREATININE 0.54 (L) 11/24/2023    EGFRIFNONA 65 02/08/2022    EGFRIFAFRI 75 02/08/2022    BCR 33.3 (H) 11/24/2023    CO2 25.5 11/24/2023    CALCIUM 8.2 (L) 11/24/2023    PROTENTOTREF 6.9 10/20/2023    ALBUMIN 2.3 (L) 11/24/2023    LABIL2 1.2 10/20/2023    AST 62 (H) 11/24/2023    ALT 52 (H) 11/24/2023     Procalcitonin 0.67    Microbiology:  11/22 BCx NGTD x 2  11/22 buttock wound culture GNR and Staph aureus  11/22 RVP neg    Radiology:  11/22 chest x-ray with sternotomy wires in place.  No acute infiltrates or pleural effusion    Assessment & Plan   Sacral decubitus wound present on admission  Polycythemia vera  Leukocytosis resolved    No evidence of purulence or infection on physical exam.  I agree with primary team that local wound care should be enough to heal his wound.  Without any signs or symptoms of infection superficial wound cultures are of no significance.  I will reassess the patient on Monday (he is still here) and certainly if the wound worsens a short course of oral antibiotics can be considered    I plan to see the patient again on Monday.  Please call with questions and concerns in the meantime    I discussed the patient's findings and my recommendations with patient, family, and nursing staff

## 2023-11-24 NOTE — PROGRESS NOTES
[facility] Medicine Services  PROGRESS NOTE    Patient Name: Madhu Santoro  : 1945  MRN: [id number]    Date of Admission: 2023  Primary Care Physician: Damian Sanchez MD    Subjective   Subjective     CC:  Follow-up weakness    Subjective: Patient continues to be weak.  He is tolerating his tube feeding.  His family is at the bedside.      Review of Systems  No current fevers or chills  No current shortness of breath or cough  No current nausea, vomiting, or diarrhea  No current chest pain or palpitations       Objective   Objective     Vital Signs:   Temp:  [98.1 °F (36.7 °C)-99 °F (37.2 °C)] 99 °F (37.2 °C)  Heart Rate:  [] 91  Resp:  [18] 18  BP: (124-136)/(60-72) 130/72        Physical Exam:  Constitutional:Awake, alert  HENT: NCAT, mucous membranes moist, neck supple  Respiratory: No cough or wheezing, nonlabored breathing, moderate inspiration  Cardiovascular: Pulse rate is normal, normal radial pulses  Gastrointestinal: PEG tube, soft, nontender, nondistended  Musculoskeletal: Elderly and somewhat chronically debilitated in appearance, minimal lower extremity edema, BMI is 24  Psychiatric: Appropriate affect, cooperative, conversational  Neurologic: No slurred speech or facial droop, follows commands  Skin: Pale, decubitus ulcer present in the gluteal region, no rashes or jaundice, warm      Results Reviewed:  Results from last 7 days   Lab Units 23  0612 23  0615 23  1317 23  0955   WBC 10*3/mm3 8.98 9.05  --  15.56*   HEMOGLOBIN g/dL 8.5* 6.9*  --  7.3*   HEMATOCRIT % 26.9* 21.8*  --  23.0*   PLATELETS 10*3/mm3 443 418  --  456*   PROCALCITONIN ng/mL  --   --  0.67*  --      Results from last 7 days   Lab Units 23  0612 23  0815 23  0615 23  1317 23  0955   SODIUM mmol/L 140  --  136 139  --  135*   POTASSIUM mmol/L 4.2 4.9 3.1* 3.1*  --  3.3*   CHLORIDE mmol/L 106  --  101 102  --  95*   CO2 mmol/L 25.5  --   29.8* 29.7*  --  32.5*   BUN mg/dL 18  --  14 13  --  18   CREATININE mg/dL 0.54*  --  0.58* 0.55*  --  0.69*   GLUCOSE mg/dL 101*  --  87 84  --  122*   CALCIUM mg/dL 8.2*  --  8.1* 7.9*  --  8.3*   ALK PHOS U/L 235*  --  207*  --   --  269*   ALT (SGPT) U/L 52*  --  50*  --   --  66*   AST (SGOT) U/L 62*  --  51*  --   --  69*   HSTROP T ng/L  --   --   --   --  49* 46*     Estimated Creatinine Clearance: 117.8 mL/min (A) (by C-G formula based on SCr of 0.54 mg/dL (L)).    Microbiology Results Abnormal       Procedure Component Value - Date/Time    Blood Culture - Blood, Arm, Left [[id number]]  (Normal) Collected: 11/22/23 1819    Lab Status: Preliminary result Specimen: Blood from Arm, Left Updated: 11/23/23 1900     Blood Culture No growth at 24 hours    Blood Culture - Blood, Arm, Right [[id number]]  (Normal) Collected: 11/22/23 1819    Lab Status: Preliminary result Specimen: Blood from Arm, Right Updated: 11/23/23 1900     Blood Culture No growth at 24 hours    Respiratory Panel PCR w/COVID-19(SARS-CoV-2) DONTRELL/LESTER/KANCHAN/PAD/COR/BRIAN In-House, NP Swab in UTM/VTM, 2 HR TAT - Swab, Nasopharynx [[id number]]  (Normal) Collected: 11/22/23 1437    Lab Status: Final result Specimen: Swab from Nasopharynx Updated: 11/22/23 1602     ADENOVIRUS, PCR Not Detected     Coronavirus 229E Not Detected     Coronavirus HKU1 Not Detected     Coronavirus NL63 Not Detected     Coronavirus OC43 Not Detected     COVID19 Not Detected     Human Metapneumovirus Not Detected     Human Rhinovirus/Enterovirus Not Detected     Influenza A PCR Not Detected     Influenza B PCR Not Detected     Parainfluenza Virus 1 Not Detected     Parainfluenza Virus 2 Not Detected     Parainfluenza Virus 3 Not Detected     Parainfluenza Virus 4 Not Detected     RSV, PCR Not Detected     Bordetella pertussis pcr Not Detected     Bordetella parapertussis PCR Not Detected     Chlamydophila pneumoniae PCR Not Detected     Mycoplasma pneumo by PCR Not Detected     Narrative:      In the setting of a positive respiratory panel with a viral infection PLUS a negative procalcitonin without other underlying concern for bacterial infection, consider observing off antibiotics or discontinuation of antibiotics and continue supportive care. If the respiratory panel is positive for atypical bacterial infection (Bordetella pertussis, Chlamydophila pneumoniae, or Mycoplasma pneumoniae), consider antibiotic de-escalation to target atypical bacterial infection.            Imaging Results (Last 24 Hours)       ** No results found for the last 24 hours. **            Results for orders placed during the hospital encounter of 10/23/23    Adult Transthoracic Echo Complete W/ Cont if Necessary Per Protocol    Interpretation Summary    Left ventricular systolic function is normal. Calculated left ventricular EF = 62.8%    Left ventricular wall thickness is consistent with concentric remodelling.    Left ventricular diastolic function was normal.    Normal right ventricular size and function present.      I have reviewed the medications:  Scheduled Meds:amitriptyline, 50 mg, Per G Tube, Nightly  amLODIPine, 5 mg, Per G Tube, Daily  lansoprazole, 30 mg, Per G Tube, QAM AC  sucralfate, 1 g, Per G Tube, BID AC      Continuous Infusions:   PRN Meds:.  acetaminophen **OR** acetaminophen **OR** acetaminophen    senna-docusate sodium **AND** polyethylene glycol **AND** bisacodyl **AND** bisacodyl    Calcium Replacement - Follow Nurse / BPA Driven Protocol    ipratropium-albuterol    Magnesium Standard Dose Replacement - Follow Nurse / BPA Driven Protocol    nitroglycerin    ondansetron **OR** ondansetron    Phosphorus Replacement - Follow Nurse / BPA Driven Protocol    Potassium Replacement - Follow Nurse / BPA Driven Protocol    Assessment & Plan   Assessment & Plan     Active Hospital Problems    Diagnosis  POA    **Weakness [R53.1]  Yes    Wound infection [T14.8XXA, L08.9]  Yes    Oral phase  dysphagia [R13.11]  Yes    Acute blood loss anemia [D62]  Yes    Anemia [D64.9]  Yes    History of CVA (cerebrovascular accident) [Z86.73]  Not Applicable    S/P percutaneous endoscopic gastrostomy (PEG) tube placement [Z93.1]  Not Applicable    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        Brief Hospital Course to date:  Madhu Santoro is a 77 y.o. male presents to the hospital with weakness and worsening anemia.    Discussion/plan:  All medical problems are new under my management today.  Vitals, labs, and imaging reviewed.    Anemia, blood loss, Hemoccult positive:  History of polycythemia vera, currently off hydroxyurea  Hemoccult positive  Increase PPI to twice daily.  Carafate  Consult gastroenterology, follows with Wound  Possible source could be recent feeding tube with NOAC or other etiology.    Recent COVID: Resolved    Oral dysphagia:  Currently receiving tube feeds via PEG tube.    Decubitus ulcer:  Wound images reviewed.  There is some surrounding erythema but seems to be more pressure related.  Superficial wound culture growing scant gram-negative and moderate Staph aureus.  Clinical significance of superficial culture unclear.  Currently afebrile and hemodynamically stable.  Plan to consult infectious disease to see whether they feel this could be best handled with local wound care or whether systemic antibiotics required.    Polycythemia: Currently off hydroxyurea.  Oncology following.    History of paroxysmal atrial flutter and stroke:  History of ablation.  Chronically anticoagulation currently held due to anemia.    Treatment plan discussed  with family who are in agreement.    DVT Prophylaxis: Mechanical      Disposition: Anticipate to SNF    CODE STATUS:   Code Status and Medical Interventions:   Ordered at: 11/22/23 1819     Medical Intervention Limits:    NO intubation (DNI)     Level Of Support Discussed With:    Patient     Code Status (Patient has no pulse and is not breathing):    No CPR (Do Not Attempt to Resuscitate)     Medical Interventions (Patient has pulse or is breathing):    Limited Support       Hayden Morales MD  11/24/23

## 2023-11-25 LAB
ANION GAP SERPL CALCULATED.3IONS-SCNC: 6.2 MMOL/L (ref 5–15)
APTT PPP: 34.9 SECONDS (ref 22.7–35.4)
BACTERIA SPEC AEROBE CULT: ABNORMAL
BASOPHILS # BLD MANUAL: 0.18 10*3/MM3 (ref 0–0.2)
BASOPHILS NFR BLD MANUAL: 2 % (ref 0–1.5)
BUN SERPL-MCNC: 18 MG/DL (ref 8–23)
BUN/CREAT SERPL: 30.5 (ref 7–25)
CALCIUM SPEC-SCNC: 8.1 MG/DL (ref 8.6–10.5)
CHLORIDE SERPL-SCNC: 103 MMOL/L (ref 98–107)
CLOSURE TME COLL+ADP BLD: 63 SECONDS (ref 52–122)
CLOSURE TME COLL+EPINEP BLD: 74 SECONDS (ref 68–148)
CO2 SERPL-SCNC: 24.8 MMOL/L (ref 22–29)
CREAT SERPL-MCNC: 0.59 MG/DL (ref 0.76–1.27)
DAT C3: POSITIVE
DAT IGG-SP REAG RBC-IMP: NEGATIVE
DAT POLY-SP REAG RBC QL: POSITIVE
DEPRECATED RDW RBC AUTO: 65.7 FL (ref 37–54)
EGFRCR SERPLBLD CKD-EPI 2021: 99.3 ML/MIN/1.73
EOSINOPHIL # BLD MANUAL: 0.09 10*3/MM3 (ref 0–0.4)
EOSINOPHIL NFR BLD MANUAL: 1 % (ref 0.3–6.2)
ERYTHROCYTE [DISTWIDTH] IN BLOOD BY AUTOMATED COUNT: 17.5 % (ref 12.3–15.4)
FIBRINOGEN PPP-MCNC: 508 MG/DL (ref 219–464)
GLUCOSE BLDC GLUCOMTR-MCNC: 116 MG/DL (ref 70–130)
GLUCOSE BLDC GLUCOMTR-MCNC: 125 MG/DL (ref 70–130)
GLUCOSE SERPL-MCNC: 126 MG/DL (ref 65–99)
GRAM STN SPEC: ABNORMAL
GRAM STN SPEC: ABNORMAL
HCT VFR BLD AUTO: 26.8 % (ref 37.5–51)
HGB BLD-MCNC: 8.5 G/DL (ref 13–17.7)
INR PPP: 1.13 (ref 0.9–1.1)
LYMPHOCYTES # BLD MANUAL: 0.9 10*3/MM3 (ref 0.7–3.1)
LYMPHOCYTES NFR BLD MANUAL: 11.1 % (ref 5–12)
MCH RBC QN AUTO: 33.1 PG (ref 26.6–33)
MCHC RBC AUTO-ENTMCNC: 31.7 G/DL (ref 31.5–35.7)
MCV RBC AUTO: 104.3 FL (ref 79–97)
METAMYELOCYTES NFR BLD MANUAL: 3 % (ref 0–0)
MONOCYTES # BLD: 0.99 10*3/MM3 (ref 0.1–0.9)
MYELOCYTES NFR BLD MANUAL: 1 % (ref 0–0)
NEUTROPHILS # BLD AUTO: 6.4 10*3/MM3 (ref 1.7–7)
NEUTROPHILS NFR BLD MANUAL: 71.7 % (ref 42.7–76)
PLAT MORPH BLD: NORMAL
PLATELET # BLD AUTO: 406 10*3/MM3 (ref 140–450)
PMV BLD AUTO: 9.9 FL (ref 6–12)
POTASSIUM SERPL-SCNC: 4.3 MMOL/L (ref 3.5–5.2)
PROTHROMBIN TIME: 14.6 SECONDS (ref 11.7–14.2)
RBC # BLD AUTO: 2.57 10*6/MM3 (ref 4.14–5.8)
RBC MORPH BLD: NORMAL
RETICS # AUTO: 0.23 10*6/MM3 (ref 0.02–0.13)
RETICS/RBC NFR AUTO: 9.03 % (ref 0.7–1.9)
SODIUM SERPL-SCNC: 134 MMOL/L (ref 136–145)
THROMBIN TIME: 18.4 SECONDS (ref 15.7–20.4)
VARIANT LYMPHS NFR BLD MANUAL: 1 % (ref 0–5)
VARIANT LYMPHS NFR BLD MANUAL: 9.1 % (ref 19.6–45.3)
WBC MORPH BLD: NORMAL
WBC NRBC COR # BLD AUTO: 8.92 10*3/MM3 (ref 3.4–10.8)

## 2023-11-25 PROCEDURE — 85045 AUTOMATED RETICULOCYTE COUNT: CPT | Performed by: INTERNAL MEDICINE

## 2023-11-25 PROCEDURE — 80048 BASIC METABOLIC PNL TOTAL CA: CPT | Performed by: INTERNAL MEDICINE

## 2023-11-25 PROCEDURE — 85730 THROMBOPLASTIN TIME PARTIAL: CPT | Performed by: INTERNAL MEDICINE

## 2023-11-25 PROCEDURE — 85576 BLOOD PLATELET AGGREGATION: CPT | Performed by: INTERNAL MEDICINE

## 2023-11-25 PROCEDURE — 99232 SBSQ HOSP IP/OBS MODERATE 35: CPT | Performed by: INTERNAL MEDICINE

## 2023-11-25 PROCEDURE — 82948 REAGENT STRIP/BLOOD GLUCOSE: CPT

## 2023-11-25 PROCEDURE — 85610 PROTHROMBIN TIME: CPT | Performed by: INTERNAL MEDICINE

## 2023-11-25 PROCEDURE — 86880 COOMBS TEST DIRECT: CPT | Performed by: INTERNAL MEDICINE

## 2023-11-25 PROCEDURE — 85670 THROMBIN TIME PLASMA: CPT | Performed by: INTERNAL MEDICINE

## 2023-11-25 PROCEDURE — 85007 BL SMEAR W/DIFF WBC COUNT: CPT | Performed by: INTERNAL MEDICINE

## 2023-11-25 PROCEDURE — 85384 FIBRINOGEN ACTIVITY: CPT | Performed by: INTERNAL MEDICINE

## 2023-11-25 PROCEDURE — 85025 COMPLETE CBC W/AUTO DIFF WBC: CPT | Performed by: INTERNAL MEDICINE

## 2023-11-25 RX ORDER — MAGNESIUM HYDROXIDE/ALUMINUM HYDROXICE/SIMETHICONE 120; 1200; 1200 MG/30ML; MG/30ML; MG/30ML
SUSPENSION ORAL EVERY 6 HOURS PRN
COMMUNITY
End: 2023-11-29 | Stop reason: HOSPADM

## 2023-11-25 RX ORDER — LOPERAMIDE HYDROCHLORIDE 2 MG/1
2 CAPSULE ORAL ONCE
Status: DISCONTINUED | OUTPATIENT
Start: 2023-11-25 | End: 2023-11-25

## 2023-11-25 RX ORDER — PANTOPRAZOLE SODIUM 40 MG/1
40 TABLET, DELAYED RELEASE ORAL DAILY
COMMUNITY
End: 2023-11-29 | Stop reason: HOSPADM

## 2023-11-25 RX ADMIN — AMLODIPINE BESYLATE 5 MG: 5 TABLET ORAL at 09:36

## 2023-11-25 RX ADMIN — LANSOPRAZOLE 30 MG: 15 TABLET, ORALLY DISINTEGRATING ORAL at 18:16

## 2023-11-25 RX ADMIN — LANSOPRAZOLE 30 MG: 15 TABLET, ORALLY DISINTEGRATING ORAL at 09:36

## 2023-11-25 RX ADMIN — ANORECTAL OINTMENT 1 APPLICATION: 15.7; .44; 24; 20.6 OINTMENT TOPICAL at 20:46

## 2023-11-25 RX ADMIN — ANORECTAL OINTMENT 1 APPLICATION: 15.7; .44; 24; 20.6 OINTMENT TOPICAL at 13:26

## 2023-11-25 RX ADMIN — HYDROCODONE BITARTRATE AND ACETAMINOPHEN 1 TABLET: 5; 325 TABLET ORAL at 09:37

## 2023-11-25 RX ADMIN — AMITRIPTYLINE HYDROCHLORIDE 50 MG: 50 TABLET, FILM COATED ORAL at 20:46

## 2023-11-25 RX ADMIN — HYDROCODONE BITARTRATE AND ACETAMINOPHEN 1 TABLET: 5; 325 TABLET ORAL at 18:16

## 2023-11-25 RX ADMIN — SUCRALFATE 1 G: 1 TABLET ORAL at 18:16

## 2023-11-25 RX ADMIN — SUCRALFATE 1 G: 1 TABLET ORAL at 09:36

## 2023-11-25 NOTE — PROGRESS NOTES
[facility] Medicine Services  PROGRESS NOTE    Patient Name: Madhu Santoro  : 1945  MRN: [id number]    Date of Admission: 2023  Primary Care Physician: Damian Sanchez MD    Subjective   Subjective     CC:  Follow-up weakness    Subjective:  Patient currently weak.  Daughter is at the bedside.  Tolerating feeds.  No new complaints.    Review of Systems  No current fevers or chills  No current shortness of breath or cough  No current nausea, vomiting, or diarrhea  No current chest pain or palpitations       Objective   Objective     Vital Signs:   Temp:  [97.5 °F (36.4 °C)-98.6 °F (37 °C)] 98.6 °F (37 °C)  Heart Rate:  [86-93] 88  Resp:  [18] 18  BP: (132-135)/(55-65) 134/55        Physical Exam:  Constitutional:Awake, alert  HENT: NCAT, mucous membranes moist, neck supple  Respiratory: No cough or wheezing, nonlabored breathing, moderate inspiration  Cardiovascular: Pulse rate is normal, normal radial pulses  Gastrointestinal: PEG tube, soft, nontender, nondistended  Musculoskeletal: Elderly and somewhat chronically debilitated in appearance, minimal lower extremity edema, BMI is 24  Psychiatric: Appropriate affect, cooperative, conversational  Neurologic: No slurred speech or facial droop, follows commands  Skin: Pale, decubitus ulcer present in the gluteal region, no rashes or jaundice, warm      Results Reviewed:  Results from last 7 days   Lab Units 23  0654 23  0612 23  0615 23  1317   WBC 10*3/mm3 8.92 8.98 9.05  --    HEMOGLOBIN g/dL 8.5* 8.5* 6.9*  --    HEMATOCRIT % 26.8* 26.9* 21.8*  --    PLATELETS 10*3/mm3 406 443 418  --    PROCALCITONIN ng/mL  --   --   --  0.67*     Results from last 7 days   Lab Units 23  0654 23  0612 239 23  0815 23  0615 23  1317 23  0955   SODIUM mmol/L 134* 140  --  136   < >  --  135*   POTASSIUM mmol/L 4.3 4.2 4.9 3.1*   < >  --  3.3*   CHLORIDE mmol/L 103 106  --  101   < >  --  95*    CO2 mmol/L 24.8 25.5  --  29.8*   < >  --  32.5*   BUN mg/dL 18 18  --  14   < >  --  18   CREATININE mg/dL 0.59* 0.54*  --  0.58*   < >  --  0.69*   GLUCOSE mg/dL 126* 101*  --  87   < >  --  122*   CALCIUM mg/dL 8.1* 8.2*  --  8.1*   < >  --  8.3*   ALK PHOS U/L  --  235*  --  207*  --   --  269*   ALT (SGPT) U/L  --  52*  --  50*  --   --  66*   AST (SGOT) U/L  --  62*  --  51*  --   --  69*   HSTROP T ng/L  --   --   --   --   --  49* 46*    < > = values in this interval not displayed.     Estimated Creatinine Clearance: 107.9 mL/min (A) (by C-G formula based on SCr of 0.59 mg/dL (L)).    Microbiology Results Abnormal       Procedure Component Value - Date/Time    Blood Culture - Blood, Arm, Left [[id number]]  (Normal) Collected: 11/22/23 1819    Lab Status: Preliminary result Specimen: Blood from Arm, Left Updated: 11/24/23 1900     Blood Culture No growth at 2 days    Blood Culture - Blood, Arm, Right [[id number]]  (Normal) Collected: 11/22/23 1819    Lab Status: Preliminary result Specimen: Blood from Arm, Right Updated: 11/24/23 1900     Blood Culture No growth at 2 days    Respiratory Panel PCR w/COVID-19(SARS-CoV-2) DONTRELL/LESTER/KANCHAN/PAD/COR/BRIAN In-House, NP Swab in UTM/VTM, 2 HR TAT - Swab, Nasopharynx [[id number]]  (Normal) Collected: 11/22/23 1437    Lab Status: Final result Specimen: Swab from Nasopharynx Updated: 11/22/23 1602     ADENOVIRUS, PCR Not Detected     Coronavirus 229E Not Detected     Coronavirus HKU1 Not Detected     Coronavirus NL63 Not Detected     Coronavirus OC43 Not Detected     COVID19 Not Detected     Human Metapneumovirus Not Detected     Human Rhinovirus/Enterovirus Not Detected     Influenza A PCR Not Detected     Influenza B PCR Not Detected     Parainfluenza Virus 1 Not Detected     Parainfluenza Virus 2 Not Detected     Parainfluenza Virus 3 Not Detected     Parainfluenza Virus 4 Not Detected     RSV, PCR Not Detected     Bordetella pertussis pcr Not Detected     Bordetella  parapertussis PCR Not Detected     Chlamydophila pneumoniae PCR Not Detected     Mycoplasma pneumo by PCR Not Detected    Narrative:      In the setting of a positive respiratory panel with a viral infection PLUS a negative procalcitonin without other underlying concern for bacterial infection, consider observing off antibiotics or discontinuation of antibiotics and continue supportive care. If the respiratory panel is positive for atypical bacterial infection (Bordetella pertussis, Chlamydophila pneumoniae, or Mycoplasma pneumoniae), consider antibiotic de-escalation to target atypical bacterial infection.            Imaging Results (Last 24 Hours)       ** No results found for the last 24 hours. **            Results for orders placed during the hospital encounter of 10/23/23    Adult Transthoracic Echo Complete W/ Cont if Necessary Per Protocol    Interpretation Summary    Left ventricular systolic function is normal. Calculated left ventricular EF = 62.8%    Left ventricular wall thickness is consistent with concentric remodelling.    Left ventricular diastolic function was normal.    Normal right ventricular size and function present.      I have reviewed the medications:  Scheduled Meds:amitriptyline, 50 mg, Per G Tube, Nightly  amLODIPine, 5 mg, Per G Tube, Daily  lansoprazole, 30 mg, Per G Tube, BID AC  Menthol-Zinc Oxide, 1 application , Topical, Q12H  sucralfate, 1 g, Per G Tube, BID AC      Continuous Infusions:   PRN Meds:.  acetaminophen **OR** acetaminophen **OR** acetaminophen    senna-docusate sodium **AND** polyethylene glycol **AND** bisacodyl **AND** bisacodyl    Calcium Replacement - Follow Nurse / BPA Driven Protocol    HYDROcodone-acetaminophen    ipratropium-albuterol    Magnesium Standard Dose Replacement - Follow Nurse / BPA Driven Protocol    nitroglycerin    ondansetron **OR** ondansetron    Phosphorus Replacement - Follow Nurse / BPA Driven Protocol    Potassium Replacement - Follow Nurse  / BPA Driven Protocol    Assessment & Plan   Assessment & Plan     Active Hospital Problems    Diagnosis  POA    **Weakness [R53.1]  Yes    Wound infection [T14.8XXA, L08.9]  Yes    Oral phase dysphagia [R13.11]  Yes    Acute blood loss anemia [D62]  Yes    Anemia [D64.9]  Yes    History of CVA (cerebrovascular accident) [Z86.73]  Not Applicable    S/P percutaneous endoscopic gastrostomy (PEG) tube placement [Z93.1]  Not Applicable    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        Brief Hospital Course to date:  Madhu Santoro is a 77 y.o. male presents to the hospital with weakness and worsening anemia.    Discussion/plan:  I had extensive discussion with family regarding all of the findings thus far and to follow the current consultant recommendations and their wishes and goals of care.  Case discussed with disease.  Despite positive superficial wound culture, there is no sign of deep tissue infection.  Contaminants are very common with superficial wound culture.  Continue frequent turns and local wound care and barrier cream.  Continue to maximize nutrition for wound healing.  Continue to reevaluate wound.  Holding anticoagulation for blood loss anemia and GI bleed.  Family would like to avoid aggressive endoscopic evaluation at this time.  Suspect etiology of bleeding is related to recent PEG healing.  PPI increased to twice daily.  If family wish we could restart anticoagulation in a few days and see if rebleeding occurs.    Anemia, blood loss, and probable GI bleed:  History of polycythemia vera, currently off hydroxyurea  Hemoccult positive  PPI to twice  daily.  Carafate  Gastroenterology following  Possible source could be recent feeding tube with NOAC or other etiology.    Recent COVID: Resolved, no current respiratory issues    Oral dysphagia:  Currently receiving tube feeds via PEG tube.  Maximize nutrition.    Decubitus ulcer:  Wound images reviewed.  Continue wound care frequent turns, maximize nutrition, and barrier cream.  Superficial wound culture was positive but polymicrobial and likely has substantial contaminant and no obvious sign of deep infection.  Infectious disease evaluated and recommended to hold off antibiotics for now based on risk versus benefit assessment.    Polycythemia: Currently off hydroxyurea.  Oncology   Oncology workup    History of paroxysmal atrial flutter and stroke:  History of ablation.  Chronically anticoagulation currently held due to anemia.    Treatment plan discussed with family who are in agreement.    DVT Prophylaxis: Mechanical      Disposition: Anticipate to SNF    CODE STATUS:   Code Status and Medical Interventions:   Ordered at: 11/22/23 1814     Medical Intervention Limits:    NO intubation (DNI)     Level Of Support Discussed With:    Patient     Code Status (Patient has no pulse and is not breathing):    No CPR (Do Not Attempt to Resuscitate)     Medical Interventions (Patient has pulse or is breathing):    Limited Support       Hayden Morales MD  11/25/23

## 2023-11-25 NOTE — PROGRESS NOTES
[facility]  Inpatient Progress Note    Reason for Follow Up: Heme positive stools and anemia    Subjective     Interval History:   No overt bleeding    Current Facility-Administered Medications:     acetaminophen (TYLENOL) tablet 650 mg, 650 mg, Per G Tube, Q4H PRN **OR** acetaminophen (TYLENOL) 160 MG/5ML oral solution 650 mg, 650 mg, Per G Tube, Q4H PRN, 650 mg at 11/24/23 1139 **OR** acetaminophen (TYLENOL) suppository 650 mg, 650 mg, Rectal, Q4H PRN, Michael Griffin MD    amitriptyline (ELAVIL) tablet 50 mg, 50 mg, Per G Tube, Nightly, Michael Griffin MD, 50 mg at 11/24/23 2126    amLODIPine (NORVASC) tablet 5 mg, 5 mg, Per G Tube, Daily, Michael Griffin MD, 5 mg at 11/25/23 0936    sennosides-docusate (PERICOLACE) 8.6-50 MG per tablet 2 tablet, 2 tablet, Per G Tube, BID PRN **AND** polyethylene glycol (MIRALAX) packet 17 g, 17 g, Per G Tube, Daily PRN, 17 g at 11/23/23 1658 **AND** bisacodyl (DULCOLAX) EC tablet 5 mg, 5 mg, Oral, Daily PRN **AND** bisacodyl (DULCOLAX) suppository 10 mg, 10 mg, Rectal, Daily PRN, Michael Griffin MD, 10 mg at 11/23/23 1705    Calcium Replacement - Follow Nurse / BPA Driven Protocol, , Does not apply, PRN, Michael Griffin MD    HYDROcodone-acetaminophen (NORCO) 5-325 MG per tablet 1 tablet, 1 tablet, Per PEG Tube, Q6H PRN, Hayden Morales MD, 1 tablet at 11/25/23 0937    ipratropium-albuterol (DUO-NEB) nebulizer solution 3 mL, 3 mL, Nebulization, Q4H PRN, Tadeo Handy MD    lansoprazole (PREVACID SOLUTAB) disintegrating tablet Tablet Delayed Release Dispersible 30 mg, 30 mg, Per G Tube, BID AC, Hayden Morales MD, 30 mg at 11/25/23 0936    Magnesium Standard Dose Replacement - Follow Nurse / BPA Driven Protocol, , Does not apply, PRN, Michael Griffin MD    Menthol-Zinc Oxide 1 application , 1 application , Topical, Q12H, Hayden Morales MD, 1 application  at 11/24/23 2126    nitroglycerin (NITROSTAT) SL  tablet 0.4 mg, 0.4 mg, Sublingual, Q5 Min PRN, Roque Veras APRN    ondansetron (ZOFRAN) tablet 4 mg, 4 mg, Per G Tube, Q6H PRN **OR** ondansetron (ZOFRAN) injection 4 mg, 4 mg, Intravenous, Q6H PRN, Michael Griffin MD    Phosphorus Replacement - Follow Nurse / BPA Driven Protocol, , Does not apply, PRN, Michael Griffin MD    Potassium Replacement - Follow Nurse / BPA Driven Protocol, , Does not apply, PRN, Michael Griffin MD    sucralfate (CARAFATE) tablet 1 g, 1 g, Per G Tube, BID AC, Michael Griffin MD, 1 g at 11/25/23 0936  Review of Systems:    There is weakness and fatigue all other systems reviewed and negative    Objective     Vital Signs  Temp:  [97.5 °F (36.4 °C)-98.6 °F (37 °C)] 98.6 °F (37 °C)  Heart Rate:  [86-93] 88  Resp:  [18] 18  BP: (132-135)/(55-65) 134/55  Body mass index is 24.78 kg/m².    Intake/Output Summary (Last 24 hours) at 11/25/2023 1225  Last data filed at 11/25/2023 0533  Gross per 24 hour   Intake 728 ml   Output 850 ml   Net -122 ml     No intake/output data recorded.     Physical Exam:   General: patient awake, alert and cooperative   Eyes: Normal lids and lashes, no scleral icterus   Neck: supple, normal ROM   Skin: warm and dry, not jaundiced   Cardiovascular: regular rhythm and rate, no murmurs auscultated   Pulm: clear to auscultation bilaterally, regular and unlabored   Abdomen: soft, nontender, nondistended; normal bowel sounds   Extremities: no rash or edema   Psychiatric: Normal mood and behavior; memory intact     Results Review:     I reviewed the patient's new clinical results.    Results from last 7 days   Lab Units 11/25/23  0654 11/24/23  0612 11/23/23  0615   WBC 10*3/mm3 8.92 8.98 9.05   HEMOGLOBIN g/dL 8.5* 8.5* 6.9*   HEMATOCRIT % 26.8* 26.9* 21.8*   PLATELETS 10*3/mm3 406 443 418     Results from last 7 days   Lab Units 11/25/23  0654 11/24/23  0612 11/23/23  2039 11/23/23  0815 11/23/23  0615 11/22/23  0955   SODIUM mmol/L 134* 140  --  136    < > 135*   POTASSIUM mmol/L 4.3 4.2 4.9 3.1*   < > 3.3*   CHLORIDE mmol/L 103 106  --  101   < > 95*   CO2 mmol/L 24.8 25.5  --  29.8*   < > 32.5*   BUN mg/dL 18 18  --  14   < > 18   CREATININE mg/dL 0.59* 0.54*  --  0.58*   < > 0.69*   CALCIUM mg/dL 8.1* 8.2*  --  8.1*   < > 8.3*   BILIRUBIN mg/dL  --  0.8  --  0.9  --  0.7   ALK PHOS U/L  --  235*  --  207*  --  269*   ALT (SGPT) U/L  --  52*  --  50*  --  66*   AST (SGOT) U/L  --  62*  --  51*  --  69*   GLUCOSE mg/dL 126* 101*  --  87   < > 122*    < > = values in this interval not displayed.         Lab Results   Lab Value Date/Time    LIPASE 11 (L) 01/26/2020 1445    LIPASE 12 (L) 09/06/2019 0932    LIPASE 25 01/27/2019 2236       Radiology:  XR Chest 1 View   Final Result   As described.               This report was finalized on 11/22/2023 10:30 AM by Dr. Mukesh Bruce M.D on Workstation: [id number]              Assessment & Plan     Active Hospital Problems    Diagnosis     **Weakness     Wound infection     Oral phase dysphagia     Acute blood loss anemia     Anemia     History of CVA (cerebrovascular accident)     S/P percutaneous endoscopic gastrostomy (PEG) tube placement     Mandel esophagus     Elevated troponin     Leukocytosis     Sacral decubitus ulcer     Polycythemia     Chronic anticoagulation     S/P CABG (coronary artery bypass graft)     Benign essential hypertension        Assessment:  Acute on chronic anemia  History of GERD with Mandel's esophagus  Status postgastrostomy placement 11/6/2023  Debility  History of CVA on chronic anticoagulation     Plan:  Continue twice daily PPI  Anemia is likely multifactorial.  He has a chronic macrocytic anemia - hemoglobin has worsened since his PEG tube was placed.  He was restarted on anticoagulation as an outpatient.  He likely has had some blood loss from the PEG site related to anticoagulation.    Recommend holding Eliquis for a  short period to hopefully allow this to further heal and  prevent further blood loss.  He will need close observation when this is restarted and iron supplementation if needed.  There were no abnormalities noted in his upper GI tract on his recent EGD that would otherwise contribute to blood loss.  Hematology workup underway  Continue tube feeds  No plans for endoscopic evaluation at this time-we will follow his clinical course        I discussed the patients findings and my recommendations with patient and nursing staff.    Everton Abel MD

## 2023-11-25 NOTE — PLAN OF CARE
Problem: Adult Inpatient Plan of Care  Goal: Plan of Care Review  Outcome: Ongoing, Progressing  Flowsheets (Taken 11/25/2023 0442)  Progress: no change  Plan of Care Reviewed With: patient  Goal: Patient-Specific Goal (Individualized)  Outcome: Ongoing, Progressing  Goal: Absence of Hospital-Acquired Illness or Injury  Outcome: Ongoing, Progressing  Intervention: Identify and Manage Fall Risk  Description: Perform standard risk assessment on admission using a validated tool or comprehensive approach appropriate to the patient; reassess fall risk frequently, with change in status or transfer to another level of care.  Communicate fall injury risk to interprofessional healthcare team.  Determine need for increased observation, equipment and environmental modification, such as low bed, signage and supportive, nonskid footwear.  Adjust safety measures to individual developmental age, stage and identified risk factors.  Reinforce the importance of safety and physical activity with patient and family.  Perform regular intentional rounding to assess need for position change, pain assessment and personal needs, including assistance with toileting.  Recent Flowsheet Documentation  Taken 11/25/2023 0402 by Amanda Burgos, RN  Safety Promotion/Fall Prevention:   safety round/check completed   nonskid shoes/slippers when out of bed   fall prevention program maintained   clutter free environment maintained   assistive device/personal items within reach  Taken 11/25/2023 0215 by Amanda Burgos, RN  Safety Promotion/Fall Prevention:   safety round/check completed   nonskid shoes/slippers when out of bed   fall prevention program maintained   clutter free environment maintained   assistive device/personal items within reach  Taken 11/25/2023 0030 by Amanda Burgos, RN  Safety Promotion/Fall Prevention:   safety round/check completed   nonskid shoes/slippers when out of bed   fall prevention program maintained   clutter free  environment maintained   assistive device/personal items within reach  Taken 11/24/2023 2126 by Amanda Burgos RN  Safety Promotion/Fall Prevention:   safety round/check completed   nonskid shoes/slippers when out of bed   fall prevention program maintained   clutter free environment maintained   assistive device/personal items within reach  Intervention: Prevent Skin Injury  Description: Perform a screening for skin injury risk, such as pressure or moisture associated skin damage on admission and at regular intervals throughout hospital stay.  Keep all areas of skin (especially folds) clean and dry.  Maintain adequate skin hydration.  Relieve and redistribute pressure and protect bony prominences; implement measures based on patient-specific risk factors.  Match turning and repositioning schedule to clinical condition.  Encourage weight shift frequently; assist with reposition if unable to complete independently.  Float heels off bed; avoid pressure on the Achilles tendon.  Keep skin free from extended contact with medical devices.  Encourage functional activity and mobility, as early as tolerated.  Use aids (e.g., slide boards, mechanical lift) during transfer.  Recent Flowsheet Documentation  Taken 11/25/2023 0402 by Amanda Burgos RN  Body Position: right  Taken 11/25/2023 0215 by Amanda Burgos RN  Body Position: left  Taken 11/25/2023 0030 by Amanda Burgos RN  Body Position: right  Taken 11/24/2023 2126 by Amanda Burgos RN  Body Position: left  Skin Protection: adhesive use limited  Intervention: Prevent and Manage VTE (Venous Thromboembolism) Risk  Description: Assess for VTE (venous thromboembolism) risk.  Encourage and assist with early ambulation.  Initiate and maintain compression or other therapy, as indicated, based on identified risk in accordance with organizational protocol and provider order.  Encourage both active and passive leg exercises while in bed, if unable to ambulate.  Recent  Flowsheet Documentation  Taken 11/25/2023 0402 by Amanda Burgos RN  Activity Management: activity encouraged  Taken 11/25/2023 0215 by Amanda Burgos RN  Activity Management: activity encouraged  Taken 11/25/2023 0030 by Amanda Burgos RN  Activity Management: activity encouraged  Taken 11/24/2023 2126 by Amanda Burgos RN  Activity Management: activity encouraged  Range of Motion: active ROM (range of motion) encouraged  Goal: Optimal Comfort and Wellbeing  Outcome: Ongoing, Progressing  Intervention: Provide Person-Centered Care  Description: Use a family-focused approach to care.  Develop trust and rapport by proactively providing information, encouraging questions, addressing concerns and offering reassurance.  Acknowledge emotional response to hospitalization.  Recognize and utilize personal coping strategies.  Honor spiritual and cultural preferences.  Recent Flowsheet Documentation  Taken 11/24/2023 2126 by Amanda Burgos RN  Trust Relationship/Rapport:   care explained   questions answered  Goal: Readiness for Transition of Care  Outcome: Ongoing, Progressing     Problem: Heart Failure Comorbidity  Goal: Maintenance of Heart Failure Symptom Control  Outcome: Ongoing, Progressing     Problem: Hypertension Comorbidity  Goal: Blood Pressure in Desired Range  Outcome: Ongoing, Progressing     Problem: Skin Injury Risk Increased  Goal: Skin Health and Integrity  Outcome: Ongoing, Progressing  Intervention: Optimize Skin Protection  Description: Perform a full pressure injury risk assessment, as indicated by screening, upon admission to care unit.  Reassess skin (injury risk, full inspection) frequently (e.g., scheduled interval, with change in condition) to provide optimal early detection and prevention.  Maintain adequate tissue perfusion (e.g., encourage fluid balance; avoid crossing legs, constrictive clothing or devices) to promote tissue oxygenation.  Maintain head of bed at lowest degree of  elevation tolerated, considering medical condition and other restrictions.  Avoid positioning onto an area that remains reddened.  Minimize incontinence and moisture (e.g., toileting schedule; moisture-wicking pad, diaper or incontinence collection device; skin moisture barrier).  Cleanse skin promptly and gently when soiled utilizing a pH-balanced cleanser.  Relieve and redistribute pressure (e.g., scheduled position changes, weight shifts, use of support surface, medical device repositioning, protective dressing application, use of positioning device, microclimate control, use of pressure-injury-monitor  Encourage increased activity, such as sitting in a chair at the bedside or early mobilization, when able to tolerate.  Recent Flowsheet Documentation  Taken 11/25/2023 0402 by Amanda Burgos RN  Head of Bed (HOB) Positioning: HOB elevated  Taken 11/25/2023 0215 by Amanda Burgos RN  Head of Bed (HOB) Positioning: HOB elevated  Taken 11/25/2023 0030 by Amanda Burgos RN  Head of Bed (HOB) Positioning: HOB elevated  Taken 11/24/2023 2126 by Amanda Burgos RN  Pressure Reduction Techniques:   heels elevated off bed   positioned off wounds  Head of Bed (HOB) Positioning: HOB elevated  Pressure Reduction Devices: (Low air loss mattress) other (see comments)  Skin Protection: adhesive use limited     Problem: Fall Injury Risk  Goal: Absence of Fall and Fall-Related Injury  Outcome: Ongoing, Progressing  Intervention: Promote Injury-Free Environment  Description: Provide a safe, barrier-free environment that encourages independent activity.  Keep care area uncluttered and well-lighted.  Determine need for increased observation or monitoring.  Avoid use of devices that minimize mobility, such as restraints or indwelling urinary catheter.  Recent Flowsheet Documentation  Taken 11/25/2023 0402 by Amanda Burgos RN  Safety Promotion/Fall Prevention:   safety round/check completed   nonskid shoes/slippers when out  of bed   fall prevention program maintained   clutter free environment maintained   assistive device/personal items within reach  Taken 11/25/2023 0215 by Amanda Burgos RN  Safety Promotion/Fall Prevention:   safety round/check completed   nonskid shoes/slippers when out of bed   fall prevention program maintained   clutter free environment maintained   assistive device/personal items within reach  Taken 11/25/2023 0030 by Amanda Burgos RN  Safety Promotion/Fall Prevention:   safety round/check completed   nonskid shoes/slippers when out of bed   fall prevention program maintained   clutter free environment maintained   assistive device/personal items within reach  Taken 11/24/2023 2126 by Amanda Burgos RN  Safety Promotion/Fall Prevention:   safety round/check completed   nonskid shoes/slippers when out of bed   fall prevention program maintained   clutter free environment maintained   assistive device/personal items within reach     Problem: Adjustment to Illness (Sepsis/Septic Shock)  Goal: Optimal Coping  Outcome: Ongoing, Progressing  Intervention: Optimize Psychosocial Adjustment to Illness  Description: Acknowledge, normalize, validate intensity and complexity of patient and support system response to situation.  Provide opportunity for expression of thoughts, feelings and concerns; respond with compassion and reassurance.  Decrease stress and anxiety by providing information about patient’s status and treatment.  Facilitate support system presence and participation in care; consider providing a diary in intensive care situation.  Support coping by recognizing current coping strategies; provide aid in developing new strategies.  Acknowledge and normalize difficulty in managing life-long lifestyle changes and expectations.  Assess and monitor for signs and symptoms of psychologic distress, anxiety and depression.  Consider palliative care consult for goals of care conversation, if the condition is  worsening despite treatment.  Recent Flowsheet Documentation  Taken 11/24/2023 2126 by Amanda Burgos RN  Family/Support System Care:   support provided   self-care encouraged     Problem: Bleeding (Sepsis/Septic Shock)  Goal: Absence of Bleeding  Outcome: Ongoing, Progressing     Problem: Glycemic Control Impaired (Sepsis/Septic Shock)  Goal: Blood Glucose Level Within Desired Range  Outcome: Ongoing, Progressing     Problem: Infection Progression (Sepsis/Septic Shock)  Goal: Absence of Infection Signs and Symptoms  Outcome: Ongoing, Progressing  Intervention: Promote Recovery  Description: Encourage pulmonary hygiene, such as cough-enhancement and airway-clearance techniques, that may include use of incentive spirometry, deep breathing and cough.  Encourage early rehabilitation and physical activity to optimize functional ability and activity tolerance, as well as minimize delirium.  Promote energy conservation; minimize oxygen demand and consumption by adjusting environment, decreasing stimulation, maintaining normothermia and treating pain.  Optimize fluid balance, nutrition intake, sleep and glycemic control to maintain tissue perfusion and enhance immune response.  Recent Flowsheet Documentation  Taken 11/25/2023 0402 by Amanda Burgos RN  Activity Management: activity encouraged  Taken 11/25/2023 0215 by Amanda Burgos RN  Activity Management: activity encouraged  Taken 11/25/2023 0030 by Amanda Burgos RN  Activity Management: activity encouraged  Taken 11/24/2023 2126 by Amanda Burgos RN  Activity Management: activity encouraged     Problem: Nutrition Impaired (Sepsis/Septic Shock)  Goal: Optimal Nutrition Intake  Outcome: Ongoing, Progressing   Goal Outcome Evaluation:  Plan of Care Reviewed With: patient        Progress: no change

## 2023-11-25 NOTE — PROGRESS NOTES
Subjective   REASON FOR FOLLOW-UP: Polycythemia vera, JAK2 mutation detected    History of Present Illness  Patient is a 77-year-old male with follow-up in the office for polycythemia vera on low-dose hydroxyurea recently hospitalized with COVID-pneumonia and discharged to the nursing home readmitted with anemia hemoglobin of 7.3.  There is no mention made of obvious bleeding the patient denies bleeding but I am not sure he is aware of the color of his stool etc.  Reticulocyte count was unexpectedly high at 11% which suggest hemolysis although his acute bleeding this can be seen.  Iron saturation 19% ferritin 566 B12 and folate normal     Patient has had transfusion and feels much better.  Of note he has been off hydroxyurea in the nursing home since discharge so this is not playing a role.  His hemoglobin at discharge from 11/9/2023 was 8.8 and the only procedure he has had done placement of the PEG tube which is unlikely to cause significant bleeding.     His daughter states he is complaining of dyspepsia and heartburn in his concern for GI bleed.  He was on Eliquis and this has been held     He is currently very deconditioned and has a decubitus ulcer is working with physical therapy     Interval history  11/25/2023  Feels okay today  Hemoglobin stable at 8.5  Reticulocyte and haptoglobin suggestive of hemolysis-crossmatch negative so indirect Azam negative    Past Medical History, Past Surgical History, Social History, Family History have been reviewed and are without significant changes except as mentioned.    Review of Systems   Neurological:  Positive for weakness.      A comprehensive 14 point review of systems was performed and was negative except as mentioned.    Medications:  The current medication list was reviewed in the EMR    ALLERGIES:    Allergies   Allergen Reactions    Atorvastatin Myalgia     Myalgia    Penicillins Hives, Swelling and Rash     Tolerates cephalosporins        Objective       Vitals:    11/24/23 1310 11/24/23 2032 11/24/23 2352 11/25/23 0739   BP: 135/65 133/64 132/62 134/55   BP Location: Left arm Left arm Left arm Left arm   Patient Position: Lying Lying Lying Lying   Pulse: 86 93 89 88   Resp: 18 18 18 18   Temp: 97.7 °F (36.5 °C) 98.1 °F (36.7 °C) 97.5 °F (36.4 °C) 98.6 °F (37 °C)   TempSrc: Oral Oral Oral Axillary   SpO2: 98% 97% 97% 97%   Weight:       Height:             10/20/2023     1:51 PM   Current Status   ECOG score 0       Physical Exam    CONSTITUTIONAL:  Vital signs reviewed.  No distress, looks comfortable.  Very weak and has trouble sitting up without help  EYES:  Conjunctivae and lids unremarkable.  PERRLA  GASTROINTESTINAL: Abdomen appears unremarkable.  Nontender.  No hepatomegaly.  No splenomegaly.  LYMPHATIC:  No cervical, supraclavicular, axillary lymphadenopathy.  SKIN:  Warm.  No rashes.sacral decubitus-sole of left foot mottled  PSYCHIATRIC:    Appears depressed    RECENT LABS:  Hematology WBC   Date Value Ref Range Status   11/25/2023 8.92 3.40 - 10.80 10*3/mm3 Final     RBC   Date Value Ref Range Status   11/25/2023 2.57 (L) 4.14 - 5.80 10*6/mm3 Final     Hemoglobin   Date Value Ref Range Status   11/25/2023 8.5 (L) 13.0 - 17.7 g/dL Final     Hematocrit   Date Value Ref Range Status   11/25/2023 26.8 (L) 37.5 - 51.0 % Final     Platelets   Date Value Ref Range Status   11/25/2023 406 140 - 450 10*3/mm3 Final              Assessment & Plan     *Hospitalized in mid October to early November with COVID-pneumonia with profound deconditioning discharged to nursing home  Readmitted 11/22/2023 with anemia-patient has not been on hydroxyurea at discharge from the hospital and remains on Eliquis  MCV is dropped significantly suggesting iron depletion and blood loss  Patient himself denies hematochezia or melena  B12 folate normal ferritin 566 iron saturation 12% reticulocyte count 9% rule out hemolysis although acute blood loss can also cause an elevated  reticulocyte  LDH elevated 356 haptoglobin low at 24-suggest hemolysis suggest hemolysis   Crossmatch compatible suggesting NOT Azam positive     *Polycythemia vera  The patient now presents for thrombocytosis with review of his record over the last year demonstrating a platelet count that is escalated from 3-400,000 now to 8/11/2022 648,000 but concurrent microcytic and hypochromic indices.  These indices have been slowly reducing over the last 2 years approximately followed more closely.  Concurrently is a mild drop in his baseline hemoglobin still well within normal limits.  thrombocytosis thought, initially, to be related to iron deficiency.  Ferritin found to be 16.3 and iron of 26 with 5% saturation and TIBC 511.   The patient was placed on ferrous gluconate which, unfortunately, he was unable to tolerate with worsening constipation.  He had further issues in early September with left renal stone, requiring cystoscopy, stent placement 8/23/2022.  9/28/2022 with repeat studies performed 9/21 demonstrating 5% iron saturation, ferritin of 12.5.  He remains further weight and fatigue, again oral iron intolerant and will require IV iron preparations for his iron deficiency anemia.  We discontinued oral iron and proceeded with Injectafer given 9/28/2022 in 10/5/2022  4/17/2023JAK  2-V617 F mutation having been detected.    In the interval he was admitted 2/19-21/23 for weakness, fall and ER evaluation not demonstrate any acute intracranial process, CT of lumbar spine with lumbar degenerative disease and other studies not show any acute abnormalities.  Fortunately he went on to improve though his wife had a positive COVID test recently on home examination.  4/10/2023 include an H&H of 18.3 and 60.9 white count of 14,700 platelet count of 477,000.  5/15/2023 hemoglobin 15.3, hematocrit 50.4.  Reviewed with Dr. Lopez plans to hold off on phlebotomy and initiate Hydrea 1000 mg daily.  We will tentatively schedule him  for return follow-up visit in 2 weeks with repeat labs and reassessment.  6/1/2020 2:23 weeks of Hydrea 1000 mg daily, WBC 5.0, hemoglobin 15.2, hematocrit 49.8, platelets 113,000.  Hydrea was reduced to 500 mg daily  Stability of counts today, 6/15/2023 on 500 mg daily with WBC 4.81, hemoglobin 14.6, hematocrit 46.7%, platelets 156,000  Patient seen 6/30/2023 with H&H of 14.1 and 46.6 white count of 4800 and platelet count of 1 46,000.  Patient subsequently assessed including 9/21/2023 with H&H gradually dropping 11.1 and 32.9, white count of 5110, platelet count 151,000, MCV not 110.8.  10/20/2023 WBC 8.04, SNC 5.42, Hgb 13.1, Platelets 247,000.  Continue Hydrea 500 mg every other day.     *Hospitalized with COVID-pneumonia and severe deconditioning in 10/23 discharged to nursing home on 11/9/2023 off Hydrea but on Eliquis     PLAN:  Continue to hold hydroxyurea  Agree with holding Eliquis  Check direct Azam and coags and platelet function  4.  Doppler legs = CT abdomen pelvis  5. daily CBC and retic  and transfuse for hemoglobin less than 8    Will follow thank for this consult                  11/25/2023      CC:

## 2023-11-25 NOTE — PLAN OF CARE
Problem: Adult Inpatient Plan of Care  Goal: Absence of Hospital-Acquired Illness or Injury  Intervention: Identify and Manage Fall Risk  Recent Flowsheet Documentation  Taken 11/25/2023 1800 by Monique Wilks RN  Safety Promotion/Fall Prevention: safety round/check completed  Taken 11/25/2023 1600 by Monique Wilks RN  Safety Promotion/Fall Prevention: safety round/check completed  Taken 11/25/2023 1400 by Monique Wilks RN  Safety Promotion/Fall Prevention: safety round/check completed  Taken 11/25/2023 1200 by Monique Wilks RN  Safety Promotion/Fall Prevention: safety round/check completed  Taken 11/25/2023 1000 by Monique Wilks RN  Safety Promotion/Fall Prevention: safety round/check completed  Taken 11/25/2023 0800 by Monique Wilks RN  Safety Promotion/Fall Prevention: safety round/check completed  Intervention: Prevent Skin Injury  Recent Flowsheet Documentation  Taken 11/25/2023 1800 by Monique Wilks RN  Body Position:   tilted   right  Taken 11/25/2023 1600 by Monique Wilks RN  Body Position: supine, legs elevated  Taken 11/25/2023 1400 by Monique Wilks RN  Body Position:   tilted   right  Skin Protection:   adhesive use limited   tubing/devices free from skin contact  Taken 11/25/2023 1000 by Monique Wilks RN  Body Position: supine  Taken 11/25/2023 0800 by Monique Wilks RN  Body Position:   tilted   left  Skin Protection:   adhesive use limited   tubing/devices free from skin contact  Intervention: Prevent and Manage VTE (Venous Thromboembolism) Risk  Recent Flowsheet Documentation  Taken 11/25/2023 1800 by Monique Wilks RN  Activity Management: activity encouraged  Taken 11/25/2023 1600 by Monique Wilks RN  Activity Management: activity encouraged  Taken 11/25/2023 1400 by Monique Wilks RN  Activity Management: activity encouraged  Taken 11/25/2023 1200 by Monique Wilks RN  Activity Management: activity encouraged  Taken 11/25/2023 1000 by Monique Wilks RN  Activity  Management: activity encouraged  Taken 11/25/2023 0800 by Monique Wilks RN  Activity Management: activity encouraged     Problem: Skin Injury Risk Increased  Goal: Skin Health and Integrity  Intervention: Optimize Skin Protection  Recent Flowsheet Documentation  Taken 11/25/2023 1800 by Monique Wilks RN  Head of Bed (HOB) Positioning: HOB elevated  Taken 11/25/2023 1600 by Monique Wilks RN  Head of Bed (HOB) Positioning: HOB elevated  Taken 11/25/2023 1400 by Monique Wilks RN  Pressure Reduction Techniques: weight shift assistance provided  Head of Bed (HOB) Positioning: HOB elevated  Pressure Reduction Devices: specialty bed utilized  Skin Protection:   adhesive use limited   tubing/devices free from skin contact  Taken 11/25/2023 1200 by Monique Wilks RN  Head of Bed (HOB) Positioning: HOB elevated  Taken 11/25/2023 1000 by Monique Wilks RN  Head of Bed (HOB) Positioning: HOB elevated  Taken 11/25/2023 0800 by Monique Wilks RN  Pressure Reduction Techniques: weight shift assistance provided  Head of Bed (HOB) Positioning: HOB elevated  Pressure Reduction Devices: specialty bed utilized  Skin Protection:   adhesive use limited   tubing/devices free from skin contact     Problem: Fall Injury Risk  Goal: Absence of Fall and Fall-Related Injury  Intervention: Promote Injury-Free Environment  Recent Flowsheet Documentation  Taken 11/25/2023 1800 by Monique Wilks RN  Safety Promotion/Fall Prevention: safety round/check completed  Taken 11/25/2023 1600 by Monique Wilks RN  Safety Promotion/Fall Prevention: safety round/check completed  Taken 11/25/2023 1400 by Monique iWlks RN  Safety Promotion/Fall Prevention: safety round/check completed  Taken 11/25/2023 1200 by Monique Wilks RN  Safety Promotion/Fall Prevention: safety round/check completed  Taken 11/25/2023 1000 by Monique Wilks RN  Safety Promotion/Fall Prevention: safety round/check completed  Taken 11/25/2023 0800 by Monique Wilks  RN  Safety Promotion/Fall Prevention: safety round/check completed     Problem: Infection Progression (Sepsis/Septic Shock)  Goal: Absence of Infection Signs and Symptoms  Intervention: Promote Recovery  Recent Flowsheet Documentation  Taken 11/25/2023 1800 by Monique Wilks RN  Activity Management: activity encouraged  Taken 11/25/2023 1600 by Monique Wilks RN  Activity Management: activity encouraged  Taken 11/25/2023 1400 by Monique Wilks RN  Activity Management: activity encouraged  Taken 11/25/2023 1200 by Monique Wilks RN  Activity Management: activity encouraged  Taken 11/25/2023 1000 by Monique Wilks RN  Activity Management: activity encouraged  Taken 11/25/2023 0800 by Monique Wilks RN  Activity Management: activity encouraged   Goal Outcome Evaluation:   VSS on 1L NC; continue tube feeds at goal rate; hgb remains stable; topical cream applied to wound on coccyx as needed; pt having frequent BMs this shift- brown in color/liquid consistency; q6 accuchecks; norco given x2; awaiting CT of abdomen and duplex of BLEs; bed locked and in low position; bed alarm set; call light in reach; q 2 turn; plan of care continues.

## 2023-11-26 ENCOUNTER — APPOINTMENT (OUTPATIENT)
Dept: CARDIOLOGY | Facility: HOSPITAL | Age: 78
DRG: 377 | End: 2023-11-26
Payer: MEDICARE

## 2023-11-26 LAB
ANION GAP SERPL CALCULATED.3IONS-SCNC: 8 MMOL/L (ref 5–15)
BH CV LOWER VASCULAR LEFT COMMON FEMORAL AUGMENT: NORMAL
BH CV LOWER VASCULAR LEFT COMMON FEMORAL COMPETENT: NORMAL
BH CV LOWER VASCULAR LEFT COMMON FEMORAL COMPRESS: NORMAL
BH CV LOWER VASCULAR LEFT COMMON FEMORAL PHASIC: NORMAL
BH CV LOWER VASCULAR LEFT COMMON FEMORAL SPONT: NORMAL
BH CV LOWER VASCULAR LEFT DISTAL FEMORAL COMPRESS: NORMAL
BH CV LOWER VASCULAR LEFT GASTRONEMIUS COMPRESS: NORMAL
BH CV LOWER VASCULAR LEFT GREATER SAPH AK COMPRESS: NORMAL
BH CV LOWER VASCULAR LEFT GREATER SAPH BK COMPRESS: NORMAL
BH CV LOWER VASCULAR LEFT LESSER SAPH COMPRESS: NORMAL
BH CV LOWER VASCULAR LEFT MID FEMORAL AUGMENT: NORMAL
BH CV LOWER VASCULAR LEFT MID FEMORAL COMPETENT: NORMAL
BH CV LOWER VASCULAR LEFT MID FEMORAL COMPRESS: NORMAL
BH CV LOWER VASCULAR LEFT MID FEMORAL PHASIC: NORMAL
BH CV LOWER VASCULAR LEFT MID FEMORAL SPONT: NORMAL
BH CV LOWER VASCULAR LEFT PERONEAL COMPRESS: NORMAL
BH CV LOWER VASCULAR LEFT POPLITEAL AUGMENT: NORMAL
BH CV LOWER VASCULAR LEFT POPLITEAL COMPETENT: NORMAL
BH CV LOWER VASCULAR LEFT POPLITEAL COMPRESS: NORMAL
BH CV LOWER VASCULAR LEFT POPLITEAL PHASIC: NORMAL
BH CV LOWER VASCULAR LEFT POPLITEAL SPONT: NORMAL
BH CV LOWER VASCULAR LEFT POSTERIOR TIBIAL COMPRESS: NORMAL
BH CV LOWER VASCULAR LEFT PROFUNDA FEMORAL COMPRESS: NORMAL
BH CV LOWER VASCULAR LEFT PROXIMAL FEMORAL COMPRESS: NORMAL
BH CV LOWER VASCULAR LEFT SAPHENOFEMORAL JUNCTION COMPRESS: NORMAL
BH CV LOWER VASCULAR RIGHT COMMON FEMORAL AUGMENT: NORMAL
BH CV LOWER VASCULAR RIGHT COMMON FEMORAL COMPETENT: NORMAL
BH CV LOWER VASCULAR RIGHT COMMON FEMORAL COMPRESS: NORMAL
BH CV LOWER VASCULAR RIGHT COMMON FEMORAL PHASIC: NORMAL
BH CV LOWER VASCULAR RIGHT COMMON FEMORAL SPONT: NORMAL
BH CV LOWER VASCULAR RIGHT DISTAL FEMORAL COMPRESS: NORMAL
BH CV LOWER VASCULAR RIGHT GASTRONEMIUS COMPRESS: NORMAL
BH CV LOWER VASCULAR RIGHT GREATER SAPH AK COMPRESS: NORMAL
BH CV LOWER VASCULAR RIGHT GREATER SAPH BK COMPRESS: NORMAL
BH CV LOWER VASCULAR RIGHT LESSER SAPH COMPRESS: NORMAL
BH CV LOWER VASCULAR RIGHT MID FEMORAL AUGMENT: NORMAL
BH CV LOWER VASCULAR RIGHT MID FEMORAL COMPETENT: NORMAL
BH CV LOWER VASCULAR RIGHT MID FEMORAL COMPRESS: NORMAL
BH CV LOWER VASCULAR RIGHT MID FEMORAL PHASIC: NORMAL
BH CV LOWER VASCULAR RIGHT MID FEMORAL SPONT: NORMAL
BH CV LOWER VASCULAR RIGHT PERONEAL COMPRESS: NORMAL
BH CV LOWER VASCULAR RIGHT POPLITEAL AUGMENT: NORMAL
BH CV LOWER VASCULAR RIGHT POPLITEAL COMPETENT: NORMAL
BH CV LOWER VASCULAR RIGHT POPLITEAL COMPRESS: NORMAL
BH CV LOWER VASCULAR RIGHT POPLITEAL PHASIC: NORMAL
BH CV LOWER VASCULAR RIGHT POPLITEAL SPONT: NORMAL
BH CV LOWER VASCULAR RIGHT POSTERIOR TIBIAL COMPRESS: NORMAL
BH CV LOWER VASCULAR RIGHT PROFUNDA FEMORAL COMPRESS: NORMAL
BH CV LOWER VASCULAR RIGHT PROXIMAL FEMORAL COMPRESS: NORMAL
BH CV LOWER VASCULAR RIGHT SAPHENOFEMORAL JUNCTION COMPRESS: NORMAL
BUN SERPL-MCNC: 17 MG/DL (ref 8–23)
BUN/CREAT SERPL: 32.1 (ref 7–25)
CALCIUM SPEC-SCNC: 8.4 MG/DL (ref 8.6–10.5)
CHLORIDE SERPL-SCNC: 101 MMOL/L (ref 98–107)
CO2 SERPL-SCNC: 26 MMOL/L (ref 22–29)
CREAT SERPL-MCNC: 0.53 MG/DL (ref 0.76–1.27)
DEPRECATED RDW RBC AUTO: 63 FL (ref 37–54)
EGFRCR SERPLBLD CKD-EPI 2021: 102.6 ML/MIN/1.73
ERYTHROCYTE [DISTWIDTH] IN BLOOD BY AUTOMATED COUNT: 16.6 % (ref 12.3–15.4)
GLUCOSE BLDC GLUCOMTR-MCNC: 100 MG/DL (ref 70–130)
GLUCOSE BLDC GLUCOMTR-MCNC: 128 MG/DL (ref 70–130)
GLUCOSE SERPL-MCNC: 92 MG/DL (ref 65–99)
HCT VFR BLD AUTO: 28.9 % (ref 37.5–51)
HGB BLD-MCNC: 9.3 G/DL (ref 13–17.7)
MCH RBC QN AUTO: 33.7 PG (ref 26.6–33)
MCHC RBC AUTO-ENTMCNC: 32.2 G/DL (ref 31.5–35.7)
MCV RBC AUTO: 104.7 FL (ref 79–97)
PLATELET # BLD AUTO: 366 10*3/MM3 (ref 140–450)
PMV BLD AUTO: 9.9 FL (ref 6–12)
POTASSIUM SERPL-SCNC: 4.1 MMOL/L (ref 3.5–5.2)
RBC # BLD AUTO: 2.76 10*6/MM3 (ref 4.14–5.8)
RETICS # AUTO: 0.23 10*6/MM3 (ref 0.02–0.13)
RETICS/RBC NFR AUTO: 8.33 % (ref 0.7–1.9)
SODIUM SERPL-SCNC: 135 MMOL/L (ref 136–145)
WBC NRBC COR # BLD AUTO: 8.88 10*3/MM3 (ref 3.4–10.8)

## 2023-11-26 PROCEDURE — 82948 REAGENT STRIP/BLOOD GLUCOSE: CPT

## 2023-11-26 PROCEDURE — 99232 SBSQ HOSP IP/OBS MODERATE 35: CPT | Performed by: INTERNAL MEDICINE

## 2023-11-26 PROCEDURE — 85027 COMPLETE CBC AUTOMATED: CPT | Performed by: INTERNAL MEDICINE

## 2023-11-26 PROCEDURE — 85045 AUTOMATED RETICULOCYTE COUNT: CPT | Performed by: INTERNAL MEDICINE

## 2023-11-26 PROCEDURE — 93970 EXTREMITY STUDY: CPT

## 2023-11-26 PROCEDURE — 80048 BASIC METABOLIC PNL TOTAL CA: CPT | Performed by: INTERNAL MEDICINE

## 2023-11-26 RX ADMIN — SUCRALFATE 1 G: 1 TABLET ORAL at 18:31

## 2023-11-26 RX ADMIN — ANORECTAL OINTMENT 1 APPLICATION: 15.7; .44; 24; 20.6 OINTMENT TOPICAL at 22:06

## 2023-11-26 RX ADMIN — LANSOPRAZOLE 30 MG: 15 TABLET, ORALLY DISINTEGRATING ORAL at 08:28

## 2023-11-26 RX ADMIN — LANSOPRAZOLE 30 MG: 15 TABLET, ORALLY DISINTEGRATING ORAL at 18:31

## 2023-11-26 RX ADMIN — ANORECTAL OINTMENT 1 APPLICATION: 15.7; .44; 24; 20.6 OINTMENT TOPICAL at 08:28

## 2023-11-26 RX ADMIN — ACETAMINOPHEN 650 MG: 160 SOLUTION ORAL at 22:05

## 2023-11-26 RX ADMIN — AMITRIPTYLINE HYDROCHLORIDE 50 MG: 50 TABLET, FILM COATED ORAL at 22:05

## 2023-11-26 RX ADMIN — HYDROCODONE BITARTRATE AND ACETAMINOPHEN 1 TABLET: 5; 325 TABLET ORAL at 11:36

## 2023-11-26 RX ADMIN — AMLODIPINE BESYLATE 5 MG: 5 TABLET ORAL at 08:28

## 2023-11-26 RX ADMIN — SUCRALFATE 1 G: 1 TABLET ORAL at 08:28

## 2023-11-26 NOTE — PROGRESS NOTES
[facility] Medicine Services  PROGRESS NOTE    Patient Name: Madhu Santoro  : 1945  MRN: [id number]    Date of Admission: 2023  Primary Care Physician: Damian Sanchez MD    Subjective   Subjective     CC:  Follow-up weakness    Subjective:  Patient feels about the same.  No new complaints.    Review of Systems  No current fevers or chills  No current shortness of breath or cough  No current nausea, vomiting, or diarrhea  No current chest pain or palpitations       Objective   Objective     Vital Signs:   Temp:  [97.9 °F (36.6 °C)-98.6 °F (37 °C)] 98.6 °F (37 °C)  Heart Rate:  [79-91] 91  Resp:  [16-20] 16  BP: (114-130)/(53-62) 114/62        Physical Exam:  Constitutional:Awake, alert  HENT: NCAT, mucous membranes moist, neck supple  Respiratory: No cough or wheezing, nonlabored breathing, moderate inspiration  Cardiovascular: Pulse rate is normal, normal radial pulses  Gastrointestinal: PEG tube, soft, nontender, nondistended  Musculoskeletal: Elderly and somewhat chronically debilitated in appearance, minimal lower extremity edema, BMI is 24  Psychiatric: Appropriate affect, cooperative, conversational  Neurologic: No slurred speech or facial droop, follows commands  Skin: Pale, decubitus ulcer present in the gluteal region, no rashes or jaundice, warm      Results Reviewed:  Results from last 7 days   Lab Units 23  0702 23  1712 23  0654 23  0612 23  0615 23  1317   WBC 10*3/mm3 8.88  --  8.92 8.98   < >  --    HEMOGLOBIN g/dL 9.3*  --  8.5* 8.5*   < >  --    HEMATOCRIT % 28.9*  --  26.8* 26.9*   < >  --    PLATELETS 10*3/mm3 366  --  406 443   < >  --    INR   --  1.13*  --   --   --   --    PROCALCITONIN ng/mL  --   --   --   --   --  0.67*    < > = values in this interval not displayed.     Results from last 7 days   Lab Units 23  0702 23  0654 23  0612 23  2039 23  0815 23  0615 23  1317 23  0955    SODIUM mmol/L 135* 134* 140  --  136   < >  --  135*   POTASSIUM mmol/L 4.1 4.3 4.2   < > 3.1*   < >  --  3.3*   CHLORIDE mmol/L 101 103 106  --  101   < >  --  95*   CO2 mmol/L 26.0 24.8 25.5  --  29.8*   < >  --  32.5*   BUN mg/dL 17 18 18  --  14   < >  --  18   CREATININE mg/dL 0.53* 0.59* 0.54*  --  0.58*   < >  --  0.69*   GLUCOSE mg/dL 92 126* 101*  --  87   < >  --  122*   CALCIUM mg/dL 8.4* 8.1* 8.2*  --  8.1*   < >  --  8.3*   ALK PHOS U/L  --   --  235*  --  207*  --   --  269*   ALT (SGPT) U/L  --   --  52*  --  50*  --   --  66*   AST (SGOT) U/L  --   --  62*  --  51*  --   --  69*   HSTROP T ng/L  --   --   --   --   --   --  49* 46*    < > = values in this interval not displayed.     Estimated Creatinine Clearance: 120.1 mL/min (A) (by C-G formula based on SCr of 0.53 mg/dL (L)).    Microbiology Results Abnormal       Procedure Component Value - Date/Time    Blood Culture - Blood, Arm, Left [[id number]]  (Normal) Collected: 11/22/23 1819    Lab Status: Preliminary result Specimen: Blood from Arm, Left Updated: 11/25/23 1901     Blood Culture No growth at 3 days    Blood Culture - Blood, Arm, Right [[id number]]  (Normal) Collected: 11/22/23 1819    Lab Status: Preliminary result Specimen: Blood from Arm, Right Updated: 11/25/23 1901     Blood Culture No growth at 3 days    Respiratory Panel PCR w/COVID-19(SARS-CoV-2) DONTRELL/LESTER/KANCHAN/PAD/COR/BRIAN In-House, NP Swab in [facility], 2 HR TAT - Swab, Nasopharynx [[id number]]  (Normal) Collected: 11/22/23 1437    Lab Status: Final result Specimen: Swab from Nasopharynx Updated: 11/22/23 1602     ADENOVIRUS, PCR Not Detected     Coronavirus 229E Not Detected     Coronavirus HKU1 Not Detected     Coronavirus NL63 Not Detected     Coronavirus OC43 Not Detected     COVID19 Not Detected     Human Metapneumovirus Not Detected     Human Rhinovirus/Enterovirus Not Detected     Influenza A PCR Not Detected     Influenza B PCR Not Detected     Parainfluenza Virus 1 Not Detected      Parainfluenza Virus 2 Not Detected     Parainfluenza Virus 3 Not Detected     Parainfluenza Virus 4 Not Detected     RSV, PCR Not Detected     Bordetella pertussis pcr Not Detected     Bordetella parapertussis PCR Not Detected     Chlamydophila pneumoniae PCR Not Detected     Mycoplasma pneumo by PCR Not Detected    Narrative:      In the setting of a positive respiratory panel with a viral infection PLUS a negative procalcitonin without other underlying concern for bacterial infection, consider observing off antibiotics or discontinuation of antibiotics and continue supportive care. If the respiratory panel is positive for atypical bacterial infection (Bordetella pertussis, Chlamydophila pneumoniae, or Mycoplasma pneumoniae), consider antibiotic de-escalation to target atypical bacterial infection.            Imaging Results (Last 24 Hours)       ** No results found for the last 24 hours. **            Results for orders placed during the hospital encounter of 10/23/23    Adult Transthoracic Echo Complete W/ Cont if Necessary Per Protocol    Interpretation Summary    Left ventricular systolic function is normal. Calculated left ventricular EF = 62.8%    Left ventricular wall thickness is consistent with concentric remodelling.    Left ventricular diastolic function was normal.    Normal right ventricular size and function present.      I have reviewed the medications:  Scheduled Meds:amitriptyline, 50 mg, Per G Tube, Nightly  amLODIPine, 5 mg, Per G Tube, Daily  lansoprazole, 30 mg, Per G Tube, BID AC  Menthol-Zinc Oxide, 1 application , Topical, Q12H  sucralfate, 1 g, Per G Tube, BID AC      Continuous Infusions:   PRN Meds:.  acetaminophen **OR** acetaminophen **OR** acetaminophen    senna-docusate sodium **AND** polyethylene glycol **AND** bisacodyl **AND** bisacodyl    Calcium Replacement - Follow Nurse / BPA Driven Protocol    HYDROcodone-acetaminophen    ipratropium-albuterol    Magnesium Standard Dose  Replacement - Follow Nurse / BPA Driven Protocol    nitroglycerin    ondansetron **OR** ondansetron    Phosphorus Replacement - Follow Nurse / BPA Driven Protocol    Potassium Replacement - Follow Nurse / BPA Driven Protocol    Assessment & Plan   Assessment & Plan     Active Hospital Problems    Diagnosis  POA    **Weakness [R53.1]  Yes    Wound infection [T14.8XXA, L08.9]  Yes    Oral phase dysphagia [R13.11]  Yes    Acute blood loss anemia [D62]  Yes    Anemia [D64.9]  Yes    History of CVA (cerebrovascular accident) [Z86.73]  Not Applicable    S/P percutaneous endoscopic gastrostomy (PEG) tube placement [Z93.1]  Not Applicable    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        Brief Hospital Course to date:  Madhu Santoro is a 77 y.o. male presents to the hospital with weakness and worsening anemia.    Discussion/plan:  Doppler lower extremities ordered and pending.  Oncology has ordered CT scan of abdomen and pelvis for further evaluation from their standpoint.  Hemoglobin appears to be trending up.  No bleeding noted.  Patient had some loose stool yesterday but likely related to tube feedings and patient earlier this hospital stay had received some medication for constipation.  Plan to continue to follow and see how this progresses.  Case discussed with disease.  Despite positive superficial wound culture, there is no sign of deep tissue infection.  Contaminants are very common with superficial wound culture.  Continue frequent turns and local wound care and barrier cream.  Continue to maximize nutrition for wound healing.  Continue to reevaluate wound.   Holding anticoagulation for blood loss anemia and GI bleed.  Family would like to avoid aggressive endoscopic evaluation at this time.  Suspect etiology of bleeding is related to recent PEG healing.  PPI increased to twice daily.  If family wish we could restart anticoagulation in a few days and see if rebleeding occurs.  Anticipate possible starting low-dose Eliquis tomorrow pending results of CT scan and opinions from consulting teams.    Anemia, blood loss, and probable GI bleed:  History of polycythemia vera, currently off hydroxyurea  Hemoccult positive  PPI to twice daily.  Carafate  Gastroenterology following  Possible source could be recent feeding tube with NOAC or other etiology.    Recent COVID: Resolved, no current respiratory issues    Oral dysphagia:  Currently receiving tube feeds via PEG tube.  Maximize nutrition.    Decubitus ulcer:  Wound images reviewed.  Continue wound care frequent turns, maximize nutrition, and barrier cream.  Superficial wound culture was positive but polymicrobial and likely has substantial contaminant and no obvious sign of deep infection.  Infectious disease evaluated and recommended to hold off antibiotics for now based on risk versus benefit assessment.    Polycythemia: Currently off hydroxyurea.  Oncology   Oncology workup    History of paroxysmal atrial flutter and stroke:  History of ablation.  Chronically anticoagulation currently held due to anemia.    Treatment plan discussed with family who are in agreement.    DVT Prophylaxis: Mechanical      Disposition: Anticipate to SNF    CODE STATUS:   Code Status and Medical Interventions:   Ordered at: 11/22/23 5914     Medical Intervention Limits:    NO intubation (DNI)     Level Of Support Discussed With:    Patient     Code Status (Patient has no pulse and is not breathing):    No CPR (Do Not Attempt to Resuscitate)     Medical Interventions (Patient has pulse or is breathing):    Limited Support       Hayden Munoz  MD [place]  11/26/23

## 2023-11-26 NOTE — PROGRESS NOTES
Subjective   REASON FOR FOLLOW-UP: Polycythemia vera, JAK2 mutation detected    History of Present Illness  Patient is a 77-year-old male with follow-up in the office for polycythemia vera on low-dose hydroxyurea recently hospitalized with COVID-pneumonia and discharged to the nursing home readmitted with anemia hemoglobin of 7.3.  There is no mention made of obvious bleeding the patient denies bleeding but I am not sure he is aware of the color of his stool etc.  Reticulocyte count was unexpectedly high at 11% which suggest hemolysis although his acute bleeding this can be seen.  Iron saturation 19% ferritin 566 B12 and folate normal     Patient has had transfusion and feels much better.  Of note he has been off hydroxyurea in the nursing home since discharge so this is not playing a role.  His hemoglobin at discharge from 11/9/2023 was 8.8 and the only procedure he has had done placement of the PEG tube which is unlikely to cause significant bleeding.     His daughter states he is complaining of dyspepsia and heartburn in his concern for GI bleed.  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    Review of Systems   Neurological:  Positive for weakness.      A comprehensive 14 point review of systems was performed and was negative except as mentioned.    Medications:  The  current medication list was reviewed in the EMR    ALLERGIES:    Allergies   Allergen Reactions    Atorvastatin Myalgia     Myalgia    Penicillins Hives, Swelling and Rash     Tolerates cephalosporins        Objective      Vitals:    11/25/23 1255 11/25/23 1935 11/26/23 0028 11/26/23 0741   BP: 130/56 124/53 119/58 114/62   BP Location: Left arm Left arm Left arm Left arm   Patient Position: Lying Lying Lying Lying   Pulse: 79 90 83 91   Resp: 18 20 18 16   Temp: 98.2 °F (36.8 °C) 97.9 °F (36.6 °C) 97.9 °F (36.6 °C) 98.6 °F (37 °C)   TempSrc: Oral Oral Oral Oral   SpO2: 93% 95% 99% 97%   Weight:       Height:             10/20/2023     1:51 PM   Current Status   ECOG score 0       Physical Exam    CONSTITUTIONAL:  Vital signs reviewed.  No distress, looks comfortable.  Very weak and has trouble sitting up without help  EYES:  Conjunctivae and lids unremarkable.  PERRLA  GASTROINTESTINAL: Abdomen appears unremarkable.  Nontender.  No hepatomegaly.  No splenomegaly.  LYMPHATIC:  No cervical, supraclavicular, axillary lymphadenopathy.  SKIN:  Warm.  No rashes.sacral decubitus-sole of left foot mottled  PSYCHIATRIC:    Appears depressed    RECENT LABS:  Hematology WBC   Date Value Ref Range Status   11/26/2023 8.88 3.40 - 10.80 10*3/mm3 Final     RBC   Date Value Ref Range Status   11/26/2023 2.76 (L) 4.14 - 5.80 10*6/mm3 Final     Hemoglobin   Date Value Ref Range Status   11/26/2023 9.3 (L) 13.0 - 17.7 g/dL Final     Hematocrit   Date Value Ref Range Status   11/26/2023 28.9 (L) 37.5 - 51.0 % Final     Platelets   Date Value Ref Range Status   11/26/2023 366 140 - 450 10*3/mm3 Final              Assessment & Plan     *Hospitalized in mid October to early November with COVID-pneumonia with profound deconditioning discharged to nursing home  Readmitted 11/22/2023 with anemia-patient has not been on hydroxyurea at discharge from the hospital and remains on Eliquis  MCV is dropped significantly suggesting iron depletion  and blood loss  Patient himself denies hematochezia or melena  B12 folate normal ferritin 566 iron saturation 12% reticulocyte count 9% rule out hemolysis although acute blood loss can also cause an elevated reticulocyte  LDH elevated 356 haptoglobin low at 24-suggest hemolysis suggest hemolysis   Crossmatch compatible suggesting indirect claudia neg  Direct claudia Complement +?  Cold agglutinin versus PNH     *Polycythemia vera  The patient now presents for thrombocytosis with review of his record over the last year demonstrating a platelet count that is escalated from 3-400,000 now to 8/11/2022 648,000 but concurrent microcytic and hypochromic indices.  These indices have been slowly reducing over the last 2 years approximately followed more closely.  Concurrently is a mild drop in his baseline hemoglobin still well within normal limits.  thrombocytosis thought, initially, to be related to iron deficiency.  Ferritin found to be 16.3 and iron of 26 with 5% saturation and TIBC 511.   The patient was placed on ferrous gluconate which, unfortunately, he was unable to tolerate with worsening constipation.  He had further issues in early September with left renal stone, requiring cystoscopy, stent placement 8/23/2022.  9/28/2022 with repeat studies performed 9/21 demonstrating 5% iron saturation, ferritin of 12.5.  He remains further weight and fatigue, again oral iron intolerant and will require IV iron preparations for his iron deficiency anemia.  We discontinued oral iron and proceeded with Injectafer given 9/28/2022 in 10/5/2022  4/17/2023JAK  2-V617 F mutation having been detected.    In the interval he was admitted 2/19-21/23 for weakness, fall and ER evaluation not demonstrate any acute intracranial process, CT of lumbar spine with lumbar degenerative disease and other studies not show any acute abnormalities.  Fortunately he went on to improve though his wife had a positive COVID test recently on home  examination.  4/10/2023 include an H&H of 18.3 and 60.9 white count of 14,700 platelet count of 477,000.  5/15/2023 hemoglobin 15.3, hematocrit 50.4.  Reviewed with Dr. Lopez plans to hold off on phlebotomy and initiate Hydrea 1000 mg daily.  We will tentatively schedule him for return follow-up visit in 2 weeks with repeat labs and reassessment.  6/1/2020 2:23 weeks of Hydrea 1000 mg daily, WBC 5.0, hemoglobin 15.2, hematocrit 49.8, platelets 113,000.  Hydrea was reduced to 500 mg daily  Stability of counts today, 6/15/2023 on 500 mg daily with WBC 4.81, hemoglobin 14.6, hematocrit 46.7%, platelets 156,000  Patient seen 6/30/2023 with H&H of 14.1 and 46.6 white count of 4800 and platelet count of 1 46,000.  Patient subsequently assessed including 9/21/2023 with H&H gradually dropping 11.1 and 32.9, white count of 5110, platelet count 151,000, MCV not 110.8.  10/20/2023 WBC 8.04, SNC 5.42, Hgb 13.1, Platelets 247,000.  Continue Hydrea 500 mg every other day.     *Hospitalized with COVID-pneumonia and severe deconditioning in 10/23 discharged to nursing home on 11/9/2023 off Hydrea but on Eliquis     PLAN:  Continue to hold hydroxyurea  Agree with holding Eliquis  Check cold agglutinins and PNH  4.  Doppler legs = CT abdomen pelvis  5. daily CBC and retic  and transfuse for hemoglobin less than 8    Will follow thank for this consult                  11/26/2023      CC:

## 2023-11-26 NOTE — PROGRESS NOTES
[facility], [facility]  Inpatient Progress Note    Reason for Follow Up: Heme positive stools anemia    Subjective     Interval History:   No overt bleeding    Current Facility-Administered Medications:     acetaminophen (TYLENOL) tablet 650 mg, 650 mg, Per G Tube, Q4H PRN **OR** acetaminophen (TYLENOL) 160 MG/5ML oral solution 650 mg, 650 mg, Per G Tube, Q4H PRN, 650 mg at 11/24/23 1139 **OR** acetaminophen (TYLENOL) suppository 650 mg, 650 mg, Rectal, Q4H PRN, Michael Griffin MD    amitriptyline (ELAVIL) tablet 50 mg, 50 mg, Per G Tube, Nightly, Michael Griffin MD, 50 mg at 11/25/23 2046    amLODIPine (NORVASC) tablet 5 mg, 5 mg, Per G Tube, Daily, Michael Griffin MD, 5 mg at 11/26/23 0828    sennosides-docusate (PERICOLACE) 8.6-50 MG per tablet 2 tablet, 2 tablet, Per G Tube, BID PRN **AND** polyethylene glycol (MIRALAX) packet 17 g, 17 g, Per G Tube, Daily PRN, 17 g at 11/23/23 1658 **AND** bisacodyl (DULCOLAX) EC tablet 5 mg, 5 mg, Oral, Daily PRN **AND** bisacodyl (DULCOLAX) suppository 10 mg, 10 mg, Rectal, Daily PRN, Michael Griffin MD, 10 mg at 11/23/23 1705    Calcium Replacement - Follow Nurse / BPA Driven Protocol, , Does not apply, PRN, Michael Griffin MD    HYDROcodone-acetaminophen (NORCO) 5-325 MG per tablet 1 tablet, 1 tablet, Per PEG Tube, Q6H PRN, Hayden Morales MD, 1 tablet at 11/26/23 1136    ipratropium-albuterol (DUO-NEB) nebulizer solution 3 mL, 3 mL, Nebulization, Q4H PRN, Tadeo Handy MD    lansoprazole (PREVACID SOLUTAB) disintegrating tablet Tablet Delayed Release Dispersible 30 mg, 30 mg, Per G Tube, BID AC, Hayden Morales MD, 30 mg at 11/26/23 0828    Magnesium Standard Dose Replacement - Follow Nurse / BPA Driven Protocol, , Does not apply, PRN, Michael Griffin MD    Menthol-Zinc Oxide 1 application , 1 application , Topical, Q12H, Hayden Morales MD, 1 application  at 11/26/23 0828    nitroglycerin (NITROSTAT) SL  tablet 0.4 mg, 0.4 mg, Sublingual, Q5 Min PRN, Roque Veras APRN    ondansetron (ZOFRAN) tablet 4 mg, 4 mg, Per G Tube, Q6H PRN **OR** ondansetron (ZOFRAN) injection 4 mg, 4 mg, Intravenous, Q6H PRN, Michael Griffin MD    Phosphorus Replacement - Follow Nurse / BPA Driven Protocol, , Does not apply, PRN, Michael Griffin MD    Potassium Replacement - Follow Nurse / BPA Driven Protocol, , Does not apply, PRN, Michael Griffin MD    sucralfate (CARAFATE) tablet 1 g, 1 g, Per G Tube, BID AC, Michael Griffin MD, 1 g at 11/26/23 0828  Review of Systems:    There is weakness and fatigue all other systems reviewed and negative    Objective     Vital Signs  Temp:  [97.9 °F (36.6 °C)-98.6 °F (37 °C)] 97.9 °F (36.6 °C)  Heart Rate:  [83-93] 93  Resp:  [16-20] 16  BP: (114-130)/(53-62) 130/53  Body mass index is 24.78 kg/m².    Intake/Output Summary (Last 24 hours) at 11/26/2023 1400  Last data filed at 11/26/2023 0405  Gross per 24 hour   Intake 3157 ml   Output 1125 ml   Net 2032 ml     No intake/output data recorded.     Physical Exam:   General: patient awake, alert and cooperative   Eyes: Normal lids and lashes, no scleral icterus   Neck: supple, normal ROM   Skin: warm and dry, not jaundiced   Cardiovascular: regular rhythm and rate, no murmurs auscultated   Pulm: clear to auscultation bilaterally, regular and unlabored   Abdomen: soft, nontender, nondistended; normal bowel sounds tube in place   Extremities: no rash or edema   Psychiatric: Normal mood and behavior; memory intact     Results Review:     I reviewed the patient's new clinical results.    Results from last 7 days   Lab Units 11/26/23  0702 11/25/23  0654 11/24/23  0612   WBC 10*3/mm3 8.88 8.92 8.98   HEMOGLOBIN g/dL 9.3* 8.5* 8.5*   HEMATOCRIT % 28.9* 26.8* 26.9*   PLATELETS 10*3/mm3 366 406 443     Results from last 7 days   Lab Units 11/26/23  0702 11/25/23  0654 11/24/23  0612 11/23/23  2039 11/23/23  0815 11/23/23  0615 11/22/23  0955    SODIUM mmol/L 135* 134* 140  --  136   < > 135*   POTASSIUM mmol/L 4.1 4.3 4.2   < > 3.1*   < > 3.3*   CHLORIDE mmol/L 101 103 106  --  101   < > 95*   CO2 mmol/L 26.0 24.8 25.5  --  29.8*   < > 32.5*   BUN mg/dL 17 18 18  --  14   < > 18   CREATININE mg/dL 0.53* 0.59* 0.54*  --  0.58*   < > 0.69*   CALCIUM mg/dL 8.4* 8.1* 8.2*  --  8.1*   < > 8.3*   BILIRUBIN mg/dL  --   --  0.8  --  0.9  --  0.7   ALK PHOS U/L  --   --  235*  --  207*  --  269*   ALT (SGPT) U/L  --   --  52*  --  50*  --  66*   AST (SGOT) U/L  --   --  62*  --  51*  --  69*   GLUCOSE mg/dL 92 126* 101*  --  87   < > 122*    < > = values in this interval not displayed.     Results from last 7 days   Lab Units 11/25/23  1712   INR  1.13*     Lab Results   Lab Value Date/Time    LIPASE 11 (L) 01/26/2020 1445    LIPASE 12 (L) 09/06/2019 0932    LIPASE 25 01/27/2019 2236       Radiology:  XR Chest 1 View   Final Result   As described.               This report was finalized on 11/22/2023 10:30 AM by Dr. Mukesh Bruce M.D on Workstation: [id number]          CT Abdomen Pelvis With Contrast    (Results Pending)       Assessment & Plan     Active Hospital Problems    Diagnosis     **Weakness     Wound infection     Oral phase dysphagia     Acute blood loss anemia     Anemia     History of CVA (cerebrovascular accident)     S/P percutaneous endoscopic gastrostomy (PEG) tube placement     Mandel esophagus     Elevated troponin     Leukocytosis     Sacral decubitus ulcer     Polycythemia     Chronic anticoagulation     S/P CABG (coronary artery bypass graft)     Benign essential hypertension        Assessment:  Acute on chronic anemia  History of GERD with Mandel's esophagus  Status postgastrostomy placement 11/6/2023  Debility  History of CVA on chronic anticoagulation     Plan:  Continue twice daily PPI  Anemia is likely multifactorial.  He has a chronic macrocytic anemia - hemoglobin has worsened since his PEG tube was placed.  He was restarted on  anticoagulation as an outpatient.  He likely has had some blood loss from the PEG site related to anticoagulation.    Recommend holding Eliquis for a  short period to hopefully allow this to further heal and prevent further blood loss.  He will need close observation when this is restarted and iron supplementation if needed.  There were no abnormalities noted in his upper GI tract on his recent EGD that would otherwise contribute to blood loss.  Hematology workup underway  Continue tube feeds  No plans for endoscopic evaluation at this time-we will follow his clinical course  CT and Doppler flow studies pending  We may be able to restart Eliquis tomorrow        I discussed the patients findings and my recommendations with patient and nursing staff.    Everton Abel MD

## 2023-11-26 NOTE — PLAN OF CARE
Problem: Adult Inpatient Plan of Care  Goal: Plan of Care Review  Outcome: Ongoing, Progressing  Flowsheets (Taken 11/26/2023 0347)  Progress: no change  Plan of Care Reviewed With: patient  Goal: Patient-Specific Goal (Individualized)  Outcome: Ongoing, Progressing  Goal: Absence of Hospital-Acquired Illness or Injury  Outcome: Ongoing, Progressing  Intervention: Identify and Manage Fall Risk  Description: Perform standard risk assessment on admission using a validated tool or comprehensive approach appropriate to the patient; reassess fall risk frequently, with change in status or transfer to another level of care.  Communicate fall injury risk to interprofessional healthcare team.  Determine need for increased observation, equipment and environmental modification, such as low bed, signage and supportive, nonskid footwear.  Adjust safety measures to individual developmental age, stage and identified risk factors.  Reinforce the importance of safety and physical activity with patient and family.  Perform regular intentional rounding to assess need for position change, pain assessment and personal needs, including assistance with toileting.  Recent Flowsheet Documentation  Taken 11/26/2023 0202 by Amanda Burgos, RN  Safety Promotion/Fall Prevention:   safety round/check completed   nonskid shoes/slippers when out of bed   fall prevention program maintained   clutter free environment maintained   assistive device/personal items within reach  Taken 11/26/2023 0032 by Amanda Burgos, RN  Safety Promotion/Fall Prevention:   safety round/check completed   nonskid shoes/slippers when out of bed   fall prevention program maintained   clutter free environment maintained   assistive device/personal items within reach  Taken 11/25/2023 2202 by Amanda Burgos, RN  Safety Promotion/Fall Prevention:   safety round/check completed   nonskid shoes/slippers when out of bed   fall prevention program maintained   clutter free  environment maintained   assistive device/personal items within reach  Taken 11/25/2023 2046 by Amanda Burgos RN  Safety Promotion/Fall Prevention:   safety round/check completed   nonskid shoes/slippers when out of bed   fall prevention program maintained   assistive device/personal items within reach   clutter free environment maintained  Intervention: Prevent Skin Injury  Description: Perform a screening for skin injury risk, such as pressure or moisture associated skin damage on admission and at regular intervals throughout hospital stay.  Keep all areas of skin (especially folds) clean and dry.  Maintain adequate skin hydration.  Relieve and redistribute pressure and protect bony prominences; implement measures based on patient-specific risk factors.  Match turning and repositioning schedule to clinical condition.  Encourage weight shift frequently; assist with reposition if unable to complete independently.  Float heels off bed; avoid pressure on the Achilles tendon.  Keep skin free from extended contact with medical devices.  Encourage functional activity and mobility, as early as tolerated.  Use aids (e.g., slide boards, mechanical lift) during transfer.  Recent Flowsheet Documentation  Taken 11/26/2023 0202 by Amanda Burgos RN  Body Position: weight shifting  Taken 11/26/2023 0032 by Amanda Burgos RN  Body Position: weight shifting  Taken 11/25/2023 2202 by Amanda Burgos RN  Body Position: weight shifting  Taken 11/25/2023 2046 by Amanda Burgos RN  Body Position: weight shifting  Skin Protection: adhesive use limited  Intervention: Prevent and Manage VTE (Venous Thromboembolism) Risk  Description: Assess for VTE (venous thromboembolism) risk.  Encourage and assist with early ambulation.  Initiate and maintain compression or other therapy, as indicated, based on identified risk in accordance with organizational protocol and provider order.  Encourage both active and passive leg exercises while  in bed, if unable to ambulate.  Recent Flowsheet Documentation  Taken 11/26/2023 0202 by Amanda Burgos RN  Activity Management: activity encouraged  Taken 11/26/2023 0032 by Amanda Burgos RN  Activity Management: activity encouraged  Taken 11/25/2023 2202 by Amanda Burgos RN  Activity Management: activity encouraged  Taken 11/25/2023 2046 by Amanda Burgos RN  Activity Management: activity encouraged  Goal: Optimal Comfort and Wellbeing  Outcome: Ongoing, Progressing  Intervention: Provide Person-Centered Care  Description: Use a family-focused approach to care.  Develop trust and rapport by proactively providing information, encouraging questions, addressing concerns and offering reassurance.  Acknowledge emotional response to hospitalization.  Recognize and utilize personal coping strategies.  Honor spiritual and cultural preferences.  Recent Flowsheet Documentation  Taken 11/25/2023 2046 by Amanda Burgos RN  Trust Relationship/Rapport:   care explained   questions answered  Goal: Readiness for Transition of Care  Outcome: Ongoing, Progressing     Problem: Heart Failure Comorbidity  Goal: Maintenance of Heart Failure Symptom Control  Outcome: Ongoing, Progressing  Intervention: Maintain Heart Failure-Management  Description: Evaluate adherence to home heart failure self-care regimen (e.g., medication, fluid balance, sodium intake, daily weight, physical activity, telemonitoring, support).  Advocate continuation of home medication and schedule.  Consider pharmacologic therapy administration time and effects (e.g., avoid giving diuretic prior to bedtime or nitrates on empty stomach).  Monitor response to pharmacologic therapy, including weight fluctuations, blood pressure and electrolyte levels.  Monitor for signs and symptoms of anxiety and depression, including severity and duration; if present, provide psychosocial support.  Consider need for heart failure clinic or palliative care consult.  Recent  Flowsheet Documentation  Taken 11/25/2023 2046 by Amanda Burgos RN  Medication Review/Management: medications reviewed     Problem: Hypertension Comorbidity  Goal: Blood Pressure in Desired Range  Outcome: Ongoing, Progressing  Intervention: Maintain Blood Pressure Management  Description: Evaluate adherence to home antihypertensive regimen (e.g., exercise and activity, diet modification, medication).  Provide scheduled antihypertensive medication; consider administration time and effects (e.g., avoid giving diuretic prior to bedtime).  Monitor response to antihypertensive medication therapy (e.g., blood pressure, electrolyte levels, medication effects).  Minimize risk of orthostatic hypotension; encourage caution with position changes, particularly if elderly.  Recent Flowsheet Documentation  Taken 11/25/2023 2046 by Amanda Burgos RN  Medication Review/Management: medications reviewed     Problem: Skin Injury Risk Increased  Goal: Skin Health and Integrity  Outcome: Ongoing, Progressing  Intervention: Optimize Skin Protection  Description: Perform a full pressure injury risk assessment, as indicated by screening, upon admission to care unit.  Reassess skin (injury risk, full inspection) frequently (e.g., scheduled interval, with change in condition) to provide optimal early detection and prevention.  Maintain adequate tissue perfusion (e.g., encourage fluid balance; avoid crossing legs, constrictive clothing or devices) to promote tissue oxygenation.  Maintain head of bed at lowest degree of elevation tolerated, considering medical condition and other restrictions.  Avoid positioning onto an area that remains reddened.  Minimize incontinence and moisture (e.g., toileting schedule; moisture-wicking pad, diaper or incontinence collection device; skin moisture barrier).  Cleanse skin promptly and gently when soiled utilizing a pH-balanced cleanser.  Relieve and redistribute pressure (e.g., scheduled position  changes, weight shifts, use of support surface, medical device repositioning, protective dressing application, use of positioning device, microclimate control, use of pressure-injury-monitor  Encourage increased activity, such as sitting in a chair at the bedside or early mobilization, when able to tolerate.  Recent Flowsheet Documentation  Taken 11/26/2023 0202 by Amanda Burgos RN  Head of Bed (HOB) Positioning: HOB elevated  Taken 11/26/2023 0032 by Amanda Burgos RN  Head of Bed (HOB) Positioning: HOB elevated  Taken 11/25/2023 2202 by Amanda Burgos RN  Head of Bed (HOB) Positioning: HOB elevated  Taken 11/25/2023 2046 by Amanda Burgos RN  Pressure Reduction Techniques:   sit time limited to 2 hours   weight shift assistance provided  Head of Bed (HOB) Positioning: HOB elevated  Pressure Reduction Devices: specialty bed utilized  Skin Protection: adhesive use limited     Problem: Fall Injury Risk  Goal: Absence of Fall and Fall-Related Injury  Outcome: Ongoing, Progressing  Intervention: Identify and Manage Contributors  Description: Develop a fall prevention plan with the patient and caregiver/family.  Provide reorientation, appropriate sensory stimulation and routines with changes in mental status to decrease risk of fall.  Promote use of personal vision and auditory aids.  Assess assistance level required for safe and effective self-care; provide support as needed, such as toileting, mobilization. For age 65 and older, implement timed toileting with assistance.  Encourage physical activity, such as performance of mobility and self-care at highest level of patient ability, multicomponent exercise program and provision of appropriate assistive devices.  If fall occurs, assess the severity of injury; implement fall injury protocol. Determine the cause and revise fall injury prevention plan.  Regularly review medication contribution to fall risk; adjust medication administration times to minimize risk  of falling.  Consider risk related to polypharmacy and age.  Balance adequate pain management with potential for oversedation.  Recent Flowsheet Documentation  Taken 11/25/2023 2046 by Amanda Burgos RN  Medication Review/Management: medications reviewed  Intervention: Promote Injury-Free Environment  Description: Provide a safe, barrier-free environment that encourages independent activity.  Keep care area uncluttered and well-lighted.  Determine need for increased observation or monitoring.  Avoid use of devices that minimize mobility, such as restraints or indwelling urinary catheter.  Recent Flowsheet Documentation  Taken 11/26/2023 0202 by Amanda Burgos RN  Safety Promotion/Fall Prevention:   safety round/check completed   nonskid shoes/slippers when out of bed   fall prevention program maintained   clutter free environment maintained   assistive device/personal items within reach  Taken 11/26/2023 0032 by Amanda Burgos RN  Safety Promotion/Fall Prevention:   safety round/check completed   nonskid shoes/slippers when out of bed   fall prevention program maintained   clutter free environment maintained   assistive device/personal items within reach  Taken 11/25/2023 2202 by Amanda Burgos RN  Safety Promotion/Fall Prevention:   safety round/check completed   nonskid shoes/slippers when out of bed   fall prevention program maintained   clutter free environment maintained   assistive device/personal items within reach  Taken 11/25/2023 2046 by Amanda Burgos RN  Safety Promotion/Fall Prevention:   safety round/check completed   nonskid shoes/slippers when out of bed   fall prevention program maintained   assistive device/personal items within reach   clutter free environment maintained     Problem: Adjustment to Illness (Sepsis/Septic Shock)  Goal: Optimal Coping  Outcome: Ongoing, Progressing  Intervention: Optimize Psychosocial Adjustment to Illness  Description: Acknowledge, normalize, validate  intensity and complexity of patient and support system response to situation.  Provide opportunity for expression of thoughts, feelings and concerns; respond with compassion and reassurance.  Decrease stress and anxiety by providing information about patient’s status and treatment.  Facilitate support system presence and participation in care; consider providing a diary in intensive care situation.  Support coping by recognizing current coping strategies; provide aid in developing new strategies.  Acknowledge and normalize difficulty in managing life-long lifestyle changes and expectations.  Assess and monitor for signs and symptoms of psychologic distress, anxiety and depression.  Consider palliative care consult for goals of care conversation, if the condition is worsening despite treatment.  Recent Flowsheet Documentation  Taken 11/25/2023 2046 by Amanda Burgos RN  Family/Support System Care: support provided     Problem: Bleeding (Sepsis/Septic Shock)  Goal: Absence of Bleeding  Outcome: Ongoing, Progressing     Problem: Glycemic Control Impaired (Sepsis/Septic Shock)  Goal: Blood Glucose Level Within Desired Range  Outcome: Ongoing, Progressing     Problem: Infection Progression (Sepsis/Septic Shock)  Goal: Absence of Infection Signs and Symptoms  Outcome: Ongoing, Progressing  Intervention: Promote Recovery  Description: Encourage pulmonary hygiene, such as cough-enhancement and airway-clearance techniques, that may include use of incentive spirometry, deep breathing and cough.  Encourage early rehabilitation and physical activity to optimize functional ability and activity tolerance, as well as minimize delirium.  Promote energy conservation; minimize oxygen demand and consumption by adjusting environment, decreasing stimulation, maintaining normothermia and treating pain.  Optimize fluid balance, nutrition intake, sleep and glycemic control to maintain tissue perfusion and enhance immune response.  Recent  Flowsheet Documentation  Taken 11/26/2023 0202 by Amanda Burgos, RN  Activity Management: activity encouraged  Taken 11/26/2023 0032 by Amanda Burgos, RN  Activity Management: activity encouraged  Taken 11/25/2023 2202 by Amanda Burgos, RN  Activity Management: activity encouraged  Taken 11/25/2023 2046 by Amanda Burgos, RN  Activity Management: activity encouraged     Problem: Nutrition Impaired (Sepsis/Septic Shock)  Goal: Optimal Nutrition Intake  Outcome: Ongoing, Progressing   Goal Outcome Evaluation:  Plan of Care Reviewed With: patient        Progress: no change

## 2023-11-27 ENCOUNTER — APPOINTMENT (OUTPATIENT)
Dept: CT IMAGING | Facility: HOSPITAL | Age: 78
DRG: 377 | End: 2023-11-27
Payer: MEDICARE

## 2023-11-27 LAB
ANION GAP SERPL CALCULATED.3IONS-SCNC: 9.5 MMOL/L (ref 5–15)
BACTERIA SPEC AEROBE CULT: NORMAL
BACTERIA SPEC AEROBE CULT: NORMAL
BUN SERPL-MCNC: 19 MG/DL (ref 8–23)
BUN/CREAT SERPL: 31.7 (ref 7–25)
CALCIUM SPEC-SCNC: 8.4 MG/DL (ref 8.6–10.5)
CHLORIDE SERPL-SCNC: 103 MMOL/L (ref 98–107)
CO2 SERPL-SCNC: 23.5 MMOL/L (ref 22–29)
CREAT SERPL-MCNC: 0.6 MG/DL (ref 0.76–1.27)
DEPRECATED RDW RBC AUTO: 63.2 FL (ref 37–54)
EGFRCR SERPLBLD CKD-EPI 2021: 98.8 ML/MIN/1.73
ERYTHROCYTE [DISTWIDTH] IN BLOOD BY AUTOMATED COUNT: 16.4 % (ref 12.3–15.4)
GLUCOSE SERPL-MCNC: 126 MG/DL (ref 65–99)
HCT VFR BLD AUTO: 29.1 % (ref 37.5–51)
HGB BLD-MCNC: 9.2 G/DL (ref 13–17.7)
MCH RBC QN AUTO: 32.7 PG (ref 26.6–33)
MCHC RBC AUTO-ENTMCNC: 31.6 G/DL (ref 31.5–35.7)
MCV RBC AUTO: 103.6 FL (ref 79–97)
PLATELET # BLD AUTO: 311 10*3/MM3 (ref 140–450)
PMV BLD AUTO: 9.9 FL (ref 6–12)
POTASSIUM SERPL-SCNC: 4 MMOL/L (ref 3.5–5.2)
RBC # BLD AUTO: 2.81 10*6/MM3 (ref 4.14–5.8)
RETICS # AUTO: 0.21 10*6/MM3 (ref 0.02–0.13)
RETICS/RBC NFR AUTO: 7.57 % (ref 0.7–1.9)
SODIUM SERPL-SCNC: 136 MMOL/L (ref 136–145)
WBC NRBC COR # BLD AUTO: 9.77 10*3/MM3 (ref 3.4–10.8)

## 2023-11-27 PROCEDURE — 25510000001 IOPAMIDOL 61 % SOLUTION: Performed by: INTERNAL MEDICINE

## 2023-11-27 PROCEDURE — 74177 CT ABD & PELVIS W/CONTRAST: CPT

## 2023-11-27 PROCEDURE — 88185 FLOWCYTOMETRY/TC ADD-ON: CPT | Performed by: INTERNAL MEDICINE

## 2023-11-27 PROCEDURE — 88184 FLOWCYTOMETRY/ TC 1 MARKER: CPT

## 2023-11-27 PROCEDURE — 85027 COMPLETE CBC AUTOMATED: CPT | Performed by: INTERNAL MEDICINE

## 2023-11-27 PROCEDURE — 85045 AUTOMATED RETICULOCYTE COUNT: CPT | Performed by: INTERNAL MEDICINE

## 2023-11-27 PROCEDURE — 80048 BASIC METABOLIC PNL TOTAL CA: CPT | Performed by: INTERNAL MEDICINE

## 2023-11-27 PROCEDURE — 99232 SBSQ HOSP IP/OBS MODERATE 35: CPT | Performed by: INTERNAL MEDICINE

## 2023-11-27 PROCEDURE — 97530 THERAPEUTIC ACTIVITIES: CPT | Performed by: PHYSICAL THERAPIST

## 2023-11-27 PROCEDURE — 86157 COLD AGGLUTININ TITER: CPT | Performed by: INTERNAL MEDICINE

## 2023-11-27 PROCEDURE — 97110 THERAPEUTIC EXERCISES: CPT | Performed by: PHYSICAL THERAPIST

## 2023-11-27 RX ADMIN — AMITRIPTYLINE HYDROCHLORIDE 50 MG: 50 TABLET, FILM COATED ORAL at 21:23

## 2023-11-27 RX ADMIN — LANSOPRAZOLE 30 MG: 15 TABLET, ORALLY DISINTEGRATING ORAL at 07:44

## 2023-11-27 RX ADMIN — HYDROCODONE BITARTRATE AND ACETAMINOPHEN 1 TABLET: 5; 325 TABLET ORAL at 10:22

## 2023-11-27 RX ADMIN — HYDROCODONE BITARTRATE AND ACETAMINOPHEN 1 TABLET: 5; 325 TABLET ORAL at 23:09

## 2023-11-27 RX ADMIN — ANORECTAL OINTMENT 1 APPLICATION: 15.7; .44; 24; 20.6 OINTMENT TOPICAL at 21:31

## 2023-11-27 RX ADMIN — SUCRALFATE 1 G: 1 TABLET ORAL at 07:46

## 2023-11-27 RX ADMIN — AMLODIPINE BESYLATE 5 MG: 5 TABLET ORAL at 10:21

## 2023-11-27 RX ADMIN — ANORECTAL OINTMENT 1 APPLICATION: 15.7; .44; 24; 20.6 OINTMENT TOPICAL at 10:25

## 2023-11-27 RX ADMIN — HYDROCODONE BITARTRATE AND ACETAMINOPHEN 1 TABLET: 5; 325 TABLET ORAL at 17:32

## 2023-11-27 RX ADMIN — LANSOPRAZOLE 30 MG: 15 TABLET, ORALLY DISINTEGRATING ORAL at 17:32

## 2023-11-27 RX ADMIN — APIXABAN 2.5 MG: 2.5 TABLET, FILM COATED ORAL at 21:23

## 2023-11-27 RX ADMIN — SUCRALFATE 1 G: 1 TABLET ORAL at 17:32

## 2023-11-27 RX ADMIN — ACETAMINOPHEN 650 MG: 160 SOLUTION ORAL at 21:26

## 2023-11-27 RX ADMIN — IOPAMIDOL 85 ML: 612 INJECTION, SOLUTION INTRAVENOUS at 11:10

## 2023-11-27 NOTE — PROGRESS NOTES
Subjective   REASON FOR FOLLOW-UP: Polycythemia vera, JAK2 mutation detected    History of Present Illness  Patient is a 77-year-old male with follow-up in the office for polycythemia vera on low-dose hydroxyurea recently hospitalized with COVID-pneumonia and discharged to the nursing home readmitted with anemia hemoglobin of 7.3.  There is no mention made of obvious bleeding the patient denies bleeding but I am not sure he is aware of the color of his stool etc.  Reticulocyte count was unexpectedly high at 11% which suggest hemolysis although his acute bleeding this can be seen.  Iron saturation 19% ferritin 566 B12 and folate normal     Patient has had transfusion and feels much better.  Of note he has been off hydroxyurea in the nursing home since discharge so this is not playing a role.  His hemoglobin at discharge from 11/9/2023 was 8.8 and the only procedure he has had done placement of the PEG tube which is unlikely to cause significant bleeding.     His daughter states he is complaining of dyspepsia and heartburn in his concern for GI bleed.  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  The patient appears depressed and globally weak.  He and his wife both frustrated by lack of progress with respect to physical therapy and ability to eat    Past Medical History, Past Surgical History, Social History, Family History have been  reviewed and are without significant changes except as mentioned.    Review of Systems   Constitutional:  Positive for activity change, appetite change and fatigue. Negative for fever.   HENT:  Positive for trouble swallowing.    Respiratory:  Negative for cough and shortness of breath.    Gastrointestinal:  Negative for diarrhea and vomiting.   Endocrine: Negative.    Musculoskeletal:  Positive for gait problem.   Skin: Negative.    Neurological:  Positive for weakness.   Psychiatric/Behavioral:  Positive for dysphoric mood.       A comprehensive 14 point review of systems was performed and was negative except as mentioned.    Medications:  The current medication list was reviewed in the EMR    ALLERGIES:    Allergies   Allergen Reactions    Atorvastatin Myalgia     Myalgia    Penicillins Hives, Swelling and Rash     Tolerates cephalosporins        Objective      Vitals:    11/26/23 0741 11/26/23 1249 11/26/23 1933 11/26/23 2302   BP: 114/62 130/53 135/67 133/64   BP Location: Left arm Left arm Left arm Left arm   Patient Position: Lying Lying Lying Lying   Pulse: 91 93 105 102   Resp: 16 16 28 28   Temp: 98.6 °F (37 °C) 97.9 °F (36.6 °C) 99.7 °F (37.6 °C) 98.2 °F (36.8 °C)   TempSrc: Oral Oral Oral Oral   SpO2: 97% 96% 99% 94%   Weight:       Height:             10/20/2023     1:51 PM   Current Status   ECOG score 0       Physical Exam    CONSTITUTIONAL: pleasant elderly man  HEENT: no icterus, no thrush, moist membranes  LYMPH: no cervical or supraclavicular lad  CV: RRR, S1S2, no murmur  RESP: cta bilat, no wheezing, no rales  GI: soft, non-tender,  +bs  MUSC: no edema   NEURO: alert and oriented x3, significant global weakness  PSYCH: Dysphoric/depressed mood and affect      RECENT LABS:  Hematology WBC   Date Value Ref Range Status   11/27/2023 9.77 3.40 - 10.80 10*3/mm3 Final     RBC   Date Value Ref Range Status   11/27/2023 2.81 (L) 4.14 - 5.80 10*6/mm3 Final     Hemoglobin   Date Value Ref Range Status    11/27/2023 9.2 (L) 13.0 - 17.7 g/dL Final     Hematocrit   Date Value Ref Range Status   11/27/2023 29.1 (L) 37.5 - 51.0 % Final     Platelets   Date Value Ref Range Status   11/27/2023 311 140 - 450 10*3/mm3 Final     Lab Results   Component Value Date    GLUCOSE 126 (H) 11/27/2023    BUN 19 11/27/2023    CREATININE 0.60 (L) 11/27/2023    EGFRRESULT 65.8 07/03/2023    EGFR 98.8 11/27/2023    BCR 31.7 (H) 11/27/2023    K 4.0 11/27/2023    CO2 23.5 11/27/2023    CALCIUM 8.4 (L) 11/27/2023    PROTENTOTREF 6.9 10/20/2023    ALBUMIN 2.3 (L) 11/24/2023    BILITOT 0.8 11/24/2023    AST 62 (H) 11/24/2023    ALT 52 (H) 11/24/2023            Venous duplex bilateral lower extremities 11/26/2023-normal exam    Assessment & Plan     *Hospitalized in mid October to early November with COVID-pneumonia with profound deconditioning discharged to nursing home  Readmitted 11/22/2023 with anemia-patient has not been on hydroxyurea at discharge from the hospital and remains on Eliquis  MCV is dropped significantly suggesting iron depletion and blood loss (but also may be related to being off Hydrea)  Patient himself denies hematochezia or melena  B12 folate normal ferritin 566 iron saturation 12% reticulocyte count 9% rule out hemolysis although acute blood loss can also cause an elevated reticulocyte  LDH elevated 356 haptoglobin low at 24-suggest hemolysis suggest hemolysis   Crossmatch compatible suggesting indirect claudia neg  Direct claudia Complement +?  Cold agglutinin versus PNH-PNH profile and cold agglutinin titer pending  Hemoglobin stable today 9.2     *Polycythemia vera  Patient initially presented to hematology August 2022 for thrombocytosis with review of his record over the last year demonstrating a platelet count that is escalated from 3-400,000 now to 8/11/2022 648,000 but concurrent microcytic and hypochromic indices.  These indices have been slowly reducing over the last 2 years approximately followed more closely.   Concurrently is a mild drop in his baseline hemoglobin still well within normal limits.  thrombocytosis thought, initially, to be related to iron deficiency.  Ferritin found to be 16.3 and iron of 26 with 5% saturation and TIBC 511.   The patient was placed on ferrous gluconate which, unfortunately, he was unable to tolerate with worsening constipation.  He had further issues in early September with left renal stone, requiring cystoscopy, stent placement 8/23/2022.  9/28/2022 with repeat studies performed 9/21 demonstrating 5% iron saturation, ferritin of 12.5.  He remains further weight and fatigue, again oral iron intolerant and will require IV iron preparations for his iron deficiency anemia.  We discontinued oral iron and proceeded with Injectafer given 9/28/2022 in 10/5/2022  4/17/2023JAK  2-V617 F mutation having been detected.    In the interval he was admitted 2/19-21/23 for weakness, fall and ER evaluation not demonstrate any acute intracranial process, CT of lumbar spine with lumbar degenerative disease and other studies not show any acute abnormalities.  Fortunately he went on to improve though his wife had a positive COVID test recently on home examination.  4/10/2023 include an H&H of 18.3 and 60.9 white count of 14,700 platelet count of 477,000.  5/15/2023 hemoglobin 15.3, hematocrit 50.4.  Reviewed with Dr. Lopez plans to hold off on phlebotomy and initiate Hydrea 1000 mg daily.  We will tentatively schedule him for return follow-up visit in 2 weeks with repeat labs and reassessment.  6/1/2020 2:23 weeks of Hydrea 1000 mg daily, WBC 5.0, hemoglobin 15.2, hematocrit 49.8, platelets 113,000.  Hydrea was reduced to 500 mg daily  Stability of counts today, 6/15/2023 on 500 mg daily with WBC 4.81, hemoglobin 14.6, hematocrit 46.7%, platelets 156,000  Patient seen 6/30/2023 with H&H of 14.1 and 46.6 white count of 4800 and platelet count of 1 46,000.  Patient subsequently assessed including 9/21/2023 with  H&H gradually dropping 11.1 and 32.9, white count of 5110, platelet count 151,000, .8.  10/20/2023 WBC 8.04, SNC 5.42, Hgb 13.1, Platelets 247,000.  Continue Hydrea 500 mg every other day.  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  CT abdomen/pelvis-pending  5.  daily CBC and retic  and transfuse for hemoglobin less than 8  6.  PT/OT  7.  Wife requesting repeat swallow study?  Defer to admitting                      11/27/2023      CC:

## 2023-11-27 NOTE — PLAN OF CARE
Problem: Adult Inpatient Plan of Care  Goal: Absence of Hospital-Acquired Illness or Injury  Intervention: Identify and Manage Fall Risk  Recent Flowsheet Documentation  Taken 11/26/2023 1800 by Monique Wilks RN  Safety Promotion/Fall Prevention: safety round/check completed  Taken 11/26/2023 1600 by Monique Wilks RN  Safety Promotion/Fall Prevention: safety round/check completed  Taken 11/26/2023 1400 by Monique Wilks RN  Safety Promotion/Fall Prevention: safety round/check completed  Taken 11/26/2023 1200 by Monique Wilks RN  Safety Promotion/Fall Prevention: safety round/check completed  Taken 11/26/2023 1000 by Monique Wilks RN  Safety Promotion/Fall Prevention: patient off unit  Taken 11/26/2023 0800 by Monique Wilks RN  Safety Promotion/Fall Prevention: safety round/check completed  Intervention: Prevent Skin Injury  Recent Flowsheet Documentation  Taken 11/26/2023 1800 by Monique Wilks RN  Body Position:   turned   right  Taken 11/26/2023 1600 by Monique Wilks RN  Body Position:   tilted   right  Taken 11/26/2023 1400 by Monique Wilks RN  Body Position: supine, legs elevated  Skin Protection:   adhesive use limited   tubing/devices free from skin contact  Taken 11/26/2023 1200 by Monique Wilks RN  Body Position: supine, legs elevated  Taken 11/26/2023 0800 by Monique Wilks RN  Body Position:   turned   right  Skin Protection:   adhesive use limited   tubing/devices free from skin contact  Intervention: Prevent and Manage VTE (Venous Thromboembolism) Risk  Recent Flowsheet Documentation  Taken 11/26/2023 1800 by Monique Wilks RN  Activity Management: activity encouraged  Taken 11/26/2023 1600 by Monique Wilks RN  Activity Management: activity encouraged  Taken 11/26/2023 1400 by Monique Wilks RN  Activity Management: activity encouraged  Range of Motion: active ROM (range of motion) encouraged  Taken 11/26/2023 1200 by Monique Wilks RN  Activity Management: activity  encouraged  Taken 11/26/2023 0800 by Monique Wilks RN  Activity Management: activity encouraged  Range of Motion: active ROM (range of motion) encouraged     Problem: Skin Injury Risk Increased  Goal: Skin Health and Integrity  Intervention: Optimize Skin Protection  Recent Flowsheet Documentation  Taken 11/26/2023 1800 by Monique Wilks RN  Head of Bed ([facility]) Positioning: HOB elevated  Taken 11/26/2023 1600 by Monique Wilks RN  Head of Bed ([facility]) Positioning: HOB elevated  Taken 11/26/2023 1400 by Monique Wilks RN  Pressure Reduction Techniques: weight shift assistance provided  Head of Bed ([facility]) Positioning: HOB elevated  Pressure Reduction Devices: specialty bed utilized  Skin Protection:   adhesive use limited   tubing/devices free from skin contact  Taken 11/26/2023 1200 by Monique Wilks RN  Head of Bed ([facility]) Positioning: HOB elevated  Taken 11/26/2023 0800 by Monique Wilks RN  Pressure Reduction Techniques: weight shift assistance provided  Head of Bed ([facility]) Positioning: HOB elevated  Pressure Reduction Devices: specialty bed utilized  Skin Protection:   adhesive use limited   tubing/devices free from skin contact     Problem: Fall Injury Risk  Goal: Absence of Fall and Fall-Related Injury  Intervention: Promote Injury-Free Environment  Recent Flowsheet Documentation  Taken 11/26/2023 1800 by Monique Wilks RN  Safety Promotion/Fall Prevention: safety round/check completed  Taken 11/26/2023 1600 by Monique Wilks RN  Safety Promotion/Fall Prevention: safety round/check completed  Taken 11/26/2023 1400 by Monique Wilks RN  Safety Promotion/Fall Prevention: safety round/check completed  Taken 11/26/2023 1200 by Monique Wilks RN  Safety Promotion/Fall Prevention: safety round/check completed  Taken 11/26/2023 1000 by Monique Wilks RN  Safety Promotion/Fall Prevention: patient off unit  Taken 11/26/2023 0800 by Monique Wilks RN  Safety Promotion/Fall Prevention: safety round/check completed      Problem: Glycemic Control Impaired (Sepsis/Septic Shock)  Goal: Blood Glucose Level Within Desired Range  Intervention: Optimize Glycemic Control  Recent Flowsheet Documentation  Taken 11/26/2023 1400 by Monique Wilks RN  Glycemic Management: blood glucose monitored  Taken 11/26/2023 0800 by Monique Wilks RN  Glycemic Management: blood glucose monitored     Problem: Infection Progression (Sepsis/Septic Shock)  Goal: Absence of Infection Signs and Symptoms  Intervention: Promote Recovery  Recent Flowsheet Documentation  Taken 11/26/2023 1800 by Monique Wilks RN  Activity Management: activity encouraged  Taken 11/26/2023 1600 by Monique Wilks RN  Activity Management: activity encouraged  Taken 11/26/2023 1400 by Monique Wilks RN  Activity Management: activity encouraged  Taken 11/26/2023 1200 by Monique Wilks RN  Activity Management: activity encouraged  Taken 11/26/2023 0800 by Monique Wilks RN  Activity Management: activity encouraged   Goal Outcome Evaluation:   Plan of Care Reviewed With: patient  Progress: improving

## 2023-11-27 NOTE — PROGRESS NOTES
[facility] Medicine Services  PROGRESS NOTE    Patient Name: Madhu Santoro  : 1945  MRN: [id number]    Date of Admission: 2023  Primary Care Physician: Damian Sanchez MD    Subjective   Subjective     CC:  Follow-up weakness    Subjective:  Patient still feels weak.  Family is frustrated with his persistent frailty and illness.  His spouse is at the bedside and says she would like him to get better quickly.  We discussed the nature of these complicated medical illnesses.  She agrees with plan for restart of blood thinner.    Review of Systems  No current fevers or chills  No current shortness of breath or cough  No current nausea, vomiting, or diarrhea  No current chest pain or palpitations       Objective   Objective     Vital Signs:   Temp:  [97.7 °F (36.5 °C)-99.7 °F (37.6 °C)] 97.7 °F (36.5 °C)  Heart Rate:  [] 89  Resp:  [18-28] 18  BP: (133-139)/(57-69) 139/69        Physical Exam:  Constitutional:Awake, alert  HENT: NCAT, mucous membranes moist, neck supple  Respiratory: No cough or wheezing, nonlabored breathing, moderate inspiration  Cardiovascular: Pulse rate is normal, normal radial pulses  Gastrointestinal: PEG tube, soft, nontender, nondistended  Musculoskeletal: Elderly and somewhat chronically debilitated in appearance, minimal lower extremity edema, BMI is 24  Psychiatric: Appropriate affect, cooperative, conversational  Neurologic: No slurred speech or facial droop, follows commands  Skin: Pale, decubitus ulcer present in the gluteal region, no rashes or jaundice, warm      Results Reviewed:  Results from last 7 days   Lab Units 23  0646 23  0702 23  1712 23  0654 23  0615 23  1317   WBC 10*3/mm3 9.77 8.88  --  8.92   < >  --    HEMOGLOBIN g/dL 9.2* 9.3*  --  8.5*   < >  --    HEMATOCRIT % 29.1* 28.9*  --  26.8*   < >  --    PLATELETS 10*3/mm3 311 366  --  406   < >  --    INR   --   --  1.13*  --   --   --    PROCALCITONIN ng/mL  --    --   --   --   --  0.67*    < > = values in this interval not displayed.     Results from last 7 days   Lab Units 11/27/23  0646 11/26/23  0702 11/25/23  0654 11/24/23  0612 11/23/23  2039 11/23/23  0815 11/23/23  0615 11/22/23  1317 11/22/23  0955   SODIUM mmol/L 136 135* 134* 140  --  136   < >  --  135*   POTASSIUM mmol/L 4.0 4.1 4.3 4.2   < > 3.1*   < >  --  3.3*   CHLORIDE mmol/L 103 101 103 106  --  101   < >  --  95*   CO2 mmol/L 23.5 26.0 24.8 25.5  --  29.8*   < >  --  32.5*   BUN mg/dL 19 17 18 18  --  14   < >  --  18   CREATININE mg/dL 0.60* 0.53* 0.59* 0.54*  --  0.58*   < >  --  0.69*   GLUCOSE mg/dL 126* 92 126* 101*  --  87   < >  --  122*   CALCIUM mg/dL 8.4* 8.4* 8.1* 8.2*  --  8.1*   < >  --  8.3*   ALK PHOS U/L  --   --   --  235*  --  207*  --   --  269*   ALT (SGPT) U/L  --   --   --  52*  --  50*  --   --  66*   AST (SGOT) U/L  --   --   --  62*  --  51*  --   --  69*   HSTROP T ng/L  --   --   --   --   --   --   --  49* 46*    < > = values in this interval not displayed.     Estimated Creatinine Clearance: 106.1 mL/min (A) (by C-G formula based on SCr of 0.6 mg/dL (L)).    Microbiology Results Abnormal       Procedure Component Value - Date/Time    Blood Culture - Blood, Arm, Left [[id number]]  (Normal) Collected: 11/22/23 1819    Lab Status: Preliminary result Specimen: Blood from Arm, Left Updated: 11/26/23 1901     Blood Culture No growth at 4 days    Blood Culture - Blood, Arm, Right [[id number]]  (Normal) Collected: 11/22/23 1819    Lab Status: Preliminary result Specimen: Blood from Arm, Right Updated: 11/26/23 1901     Blood Culture No growth at 4 days    Respiratory Panel PCR w/COVID-19(SARS-CoV-2) [facility]CHAN/[facility] In-House, NP Swab in UTM/VTM, 2 HR TAT - Swab, Nasopharynx [[id number]]  (Normal) Collected: 11/22/23 1437    Lab Status: Final result Specimen: Swab from Nasopharynx Updated: 11/22/23 1602     ADENOVIRUS, PCR Not Detected     Coronavirus 229E Not Detected      Coronavirus HKU1 Not Detected     Coronavirus NL63 Not Detected     Coronavirus OC43 Not Detected     COVID19 Not Detected     Human Metapneumovirus Not Detected     Human Rhinovirus/Enterovirus Not Detected     Influenza A PCR Not Detected     Influenza B PCR Not Detected     Parainfluenza Virus 1 Not Detected     Parainfluenza Virus 2 Not Detected     Parainfluenza Virus 3 Not Detected     Parainfluenza Virus 4 Not Detected     RSV, PCR Not Detected     Bordetella pertussis pcr Not Detected     Bordetella parapertussis PCR Not Detected     Chlamydophila pneumoniae PCR Not Detected     Mycoplasma pneumo by PCR Not Detected    Narrative:      In the setting of a positive respiratory panel with a viral infection PLUS a negative procalcitonin without other underlying concern for bacterial infection, consider observing off antibiotics or discontinuation of antibiotics and continue supportive care. If the respiratory panel is positive for atypical bacterial infection (Bordetella pertussis, Chlamydophila pneumoniae, or Mycoplasma pneumoniae), consider antibiotic de-escalation to target atypical bacterial infection.            Imaging Results (Last 24 Hours)       Procedure Component Value Units Date/Time    CT Abdomen Pelvis With Contrast [[id number]] Collected: 11/27/23 1247     Updated: 11/27/23 1254    Narrative:      EXAMINATION: CT OF THE ABDOMEN AND PELVIS WITH CONTRAST     TECHNIQUE: Computed tomography of the abdomen and pelvis after the  uneventful administration of nonionic intravenous contrast per  pancreatic protocol. Radiation dose reduction techniques were utilized,  including automated exposure control and exposure modulation based on  body size.     HISTORY: Pancreatic mass     COMPARISON: 8/22/2022     FINDINGS: Limited evaluation of the inferior thorax demonstrates  emphysematous changes and atelectasis and scarring with superimposed  patchy left greater than right lower lobe opacities, possibly  pneumonia.  No pneumothorax is seen. There is bilateral gynecomastia. The heart is  normal in size and configuration, without pericardial effusion. Coronary  calcifications are demonstrated. Median sternotomy changes are seen.     The gallbladder is absent. There is mild intrahepatic biliary ductal  dilation. Low-attenuation renal lesions are technically indeterminate,  likely cysts. There are stable tiny cystic pancreatic lesions, for  example in uncinate process lesion measuring 1.5 x 0.9 cm on series 3,  image 82. No enlarged or suspiciously enhancing lesions are seen.     There is a gastric tube. Colonic diverticula are seen, without evidence  of acute diverticulitis. The gallbladder is absent. A punctate  nonobstructing stone is seen in the right kidney. Low-attenuation renal  lesions are technically indeterminate, likely cysts.        The spleen, adrenal glands, stomach, small bowel, urinary bladder, and  abdominal vasculature are normal. No intraperitoneal fluid collection or  free gas are seen. No enlarged lymph nodes are demonstrated.     Bone windows demonstrate degenerative changes, without suspicious  osseous lesion seen.       Impression:      1. FINDINGS suggestive of bilateral lower lobe pneumonia     2. 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 Consider 1 year follow-up MRCP if clinically.     3. Incidental findings as above.     This report was finalized on 11/27/2023 12:51 PM by Dr. Giorgio Del Rosario M.D on Workstation: [id number]               Results for orders placed during the hospital encounter of 10/23/23    Adult Transthoracic Echo Complete W/ Cont if Necessary Per Protocol    Interpretation Summary    Left ventricular systolic function is normal. Calculated left ventricular EF = 62.8%    Left ventricular wall thickness is consistent with concentric remodelling.    Left ventricular diastolic function was normal.    Normal right ventricular size and function  present.      I have reviewed the medications:  Scheduled Meds:amitriptyline, 50 mg, Per G Tube, Nightly  amLODIPine, 5 mg, Per G Tube, Daily  apixaban, 2.5 mg, Oral, Q12H  lansoprazole, 30 mg, Per G Tube, BID AC  Menthol-Zinc Oxide, 1 application , Topical, Q12H  sucralfate, 1 g, Per G Tube, BID AC      Continuous Infusions:   PRN Meds:.  acetaminophen **OR** acetaminophen **OR** acetaminophen    senna-docusate sodium **AND** polyethylene glycol **AND** bisacodyl **AND** bisacodyl    Calcium Replacement - Follow Nurse / BPA Driven Protocol    HYDROcodone-acetaminophen    ipratropium-albuterol    Magnesium Standard Dose Replacement - Follow Nurse / BPA Driven Protocol    nitroglycerin    ondansetron **OR** ondansetron    Phosphorus Replacement - Follow Nurse / BPA Driven Protocol    Potassium Replacement - Follow Nurse / BPA Driven Protocol    Assessment & Plan   Assessment & Plan     Active Hospital Problems    Diagnosis  POA    **Weakness [R53.1]  Yes    Wound infection [T14.8XXA, L08.9]  Yes    Oral phase dysphagia [R13.11]  Yes    Acute blood loss anemia [D62]  Yes    Anemia [D64.9]  Yes    History of CVA (cerebrovascular accident) [Z86.73]  Not Applicable    S/P percutaneous endoscopic gastrostomy (PEG) tube placement [Z93.1]  Not Applicable    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        Brief Hospital Course to date:  Madhu Santoro is a 77 y.o. male presents to the hospital with weakness and worsening anemia.    Discussion/plan:  Per my conversation with Dr. Abel it is felt that patient can restart Eliquis tonight and monitor to see if he tolerates.   Due to his advanced age, frailty, and borderline low weight we will initially try him at the low-dose and this may be the more optimal dose for him at this current time.  Low-dose order to start this evening.  Case discussed with infectious disease.  It is not felt that patient has active cellulitis or deep tissue injury and we will continue to treat with wound care for decubitus ulcer.  Maximizing nutrition is much as possible also be needed as well as strengthening.      Family would like to avoid aggressive endoscopic evaluation at this time.  Suspect etiology of bleeding is related to recent PEG healing.  PPI has been increased to twice daily.      CT images reviewed today.  Patient does have bibasilar opacities which radiology feels could be consistent with pneumonia.  I suspect more this is an aspiration pneumonitis.  He is afebrile without leukocytosis and I will discuss with infectious disease again regarding the risk versus benefit of antimicrobial therapy.  Pancreas lesion is stable felt to be probably old pseudocyst.    Anemia, blood loss, and probable GI bleed:  History of polycythemia vera, currently off hydroxyurea  Hemoccult positive  PPI to twice daily.  Carafate  Gastroenterology following  Possible source could be recent feeding tube with NOAC or other etiology.  NOAC initially held    Recent COVID: Resolved, no current respiratory issues    Oral dysphagia:  Currently receiving tube feeds via PEG tube.  Maximize nutrition.    Decubitus ulcer:  Wound images reviewed.  Continue wound care frequent turns, maximize nutrition, and barrier cream.  Superficial wound culture was positive but polymicrobial and likely has substantial contaminant and no obvious sign of deep infection.  Infectious disease evaluated and recommended to hold off antibiotics for now based on risk versus benefit assessment.    Polycythemia: Currently off hydroxyurea.  Oncology   Oncology workup    History of paroxysmal atrial  flutter and stroke:  History of ablation.  Chronically anticoagulation currently held due to anemia.    Treatment plan discussed with family who are in agreement.    DVT Prophylaxis: Mechanical      Disposition: Anticipate to SNF most likely on 11/29 if stable on blood thinners and pending further final consultant recommendations    CODE STATUS:   Code Status and Medical Interventions:   Ordered at: 11/22/23 1814     Medical Intervention Limits:    NO intubation (DNI)     Level Of Support Discussed With:    Patient     Code Status (Patient has no pulse and is not breathing):    No CPR (Do Not Attempt to Resuscitate)     Medical Interventions (Patient has pulse or is breathing):    Limited Support       Hayden Morales MD  11/27/23

## 2023-11-27 NOTE — PLAN OF CARE
Goal Outcome Evaluation:              Outcome Evaluation: Chart reviewed.  Note wife requesting repeat swallow study.  Pt demonstrated moderate-severe oropharyngeal dysphagia on VFSS 11/3 showing aspiration nectar and penetration of nectar/honey/puree.  Also, barium seen on CT scan in esophagus two hours following vfss indicating additional esophageal factors as well. Pt fatigued with physical therapy today and refusing occupational therapy d/t to not feeling well.  Will f/u tomorrow.

## 2023-11-27 NOTE — SIGNIFICANT NOTE
11/27/23 1456   OTHER   Discipline occupational therapist   Rehab Time/Intention   Session Not Performed patient/family declined, not feeling well  (pt reports he is fatiqued after PT and declines participation in OT this afternoon. spouse present at bedside.)   Recommendation   OT - Next Appointment 11/28/23

## 2023-11-27 NOTE — PROGRESS NOTES
LOS: 3 days     Chief Complaint:Positive wound culture     Interval History:  Afebrile, no new complaints or events. No diarrhea. No rash.  States buttock pain improved    Vital Signs  Temp:  [97.7 °F (36.5 °C)-99.7 °F (37.6 °C)] 97.7 °F (36.5 °C)  Heart Rate:  [] 86  Resp:  [16-28] 18  BP: (130-139)/(53-67) 139/57    Physical Exam:  General: In no acute distress  Cardiovascular: RRR, no LE edema   Respiratory: bibasilar crackles   GI: Soft, NT/ND, + bowel sounds bilaterally  Skin: No rashes, sacral wound with no surrounding erythema or purulence    Antibiotics:   None    Results Review:    Lab Results   Component Value Date    WBC 9.77 11/27/2023    HGB 9.2 (L) 11/27/2023    HCT 29.1 (L) 11/27/2023    .6 (H) 11/27/2023     11/27/2023     Lab Results   Component Value Date    GLUCOSE 126 (H) 11/27/2023    BUN 19 11/27/2023    CREATININE 0.60 (L) 11/27/2023    EGFRIFNONA 65 02/08/2022    EGFRIFAFRI 75 02/08/2022    BCR 31.7 (H) 11/27/2023    CO2 23.5 11/27/2023    CALCIUM 8.4 (L) 11/27/2023    PROTENTOTREF 6.9 10/20/2023    ALBUMIN 2.3 (L) 11/24/2023    LABIL2 1.2 10/20/2023    AST 62 (H) 11/24/2023    ALT 52 (H) 11/24/2023       Microbiology:  11/22 BCx NGTD x 2  11/22 buttock wound culture GNR and Staph aureus  11/22 RVP neg    Assessment & Plan   Sacral decubitus wound present on admission  Polycythemia vera  Leukocytosis resolved    The patient does not have any evidence of secondary bacterial infection on his sacral wound.  No systemic antibiotics are indicated at this time.  Continue aggressive wound care.    ID will sign off.  Please do not hesitate to call us with further questions or concerns

## 2023-11-27 NOTE — THERAPY TREATMENT NOTE
Patient Name: Madhu Santoro  : 1945    MRN: [id number]                              Today's Date: 2023       Admit Date: 2023    Visit Dx:     ICD-10-CM ICD-9-CM   1. Weakness  R53.1 780.79   2. Elevated troponin  R79.89 790.6   3. Hypoxia  R09.02 799.02     Patient Active Problem List   Diagnosis    Anxiety    Arthritis    Coronary artery disease due to lipid rich plaque    HLD (hyperlipidemia)    Benign essential hypertension    DDD (degenerative disc disease), lumbosacral    Cerebrovascular accident    Encounter for screening for cardiovascular disorders    Gastroesophageal reflux disease    Hyperlipidemia    Stenosis of carotid artery    Former smoker    History of cardiac catheterization    S/P ablation of atrial flutter    Typical atrial flutter    S/P CABG (coronary artery bypass graft)    Adenomatous polyp of ascending colon    Abdominal bloating    Stroke    PAD (peripheral artery disease)    Acute cholecystitis    Right upper quadrant abdominal pain    Chronic pain of both hips    Chronic bilateral low back pain without sciatica    Sacroiliac joint dysfunction of both sides    Encounter for long-term (current) use of high-risk medication    Lumbar facet arthropathy    Stroke-like symptoms    CVA (cerebral vascular accident)    Personal history of colonic polyps    Arthralgia of multiple joints    Iron deficiency    Thrombocytosis    Left ureteral stone    Obstructive uropathy    Chronic anticoagulation    Facial skin lesion    Iron deficiency anemia    Polycythemia    Neuropathy    Weakness    Pneumonia    Close exposure to COVID-19 virus    Polycythemia vera    Chronic gout without tophus    COVID-19    Cytokine release syndrome, grade 2    Anemia    History of CVA (cerebrovascular accident)    S/P percutaneous endoscopic gastrostomy (PEG) tube placement    Mandel esophagus    Elevated troponin    Leukocytosis    Sacral decubitus ulcer    Wound infection    Oral phase dysphagia     "Acute blood loss anemia     Past Medical History:   Diagnosis Date    Anxiety     Arthritis     Atrial flutter     Status post cavotricuspid isthmus ablation by Dr. Gustafson on 4/18/17    Mandel esophagus     Benign essential hypertension     CAD (coronary artery disease)     3 vessel CABG 4/11/17 by Dr. Cortez: ROSARIO-prox LAD, SVG-OM1, SVG-OM3    Carotid artery disease     Status post carotid endarterectomy - USG 4/10/17: 50-59% NICHELLE, 1-15% LICA.     Colonic polyp     Cyst of pancreas     DDD (degenerative disc disease), lumbosacral     GERD (gastroesophageal reflux disease)     H/O bone density study 2013    H/O complete eye exam 2014    History of kidney stone     HLD (hyperlipidemia)     Hypertension     Kidney stone     8/22/22    Lipid screening 05/31/2013    Low back pain     [facility]    Screening for prostate cancer 07/07/2015    Skin cancer     nose    Stroke     RESIDUAL--\"BALANCE ISSUES\"     Past Surgical History:   Procedure Laterality Date    CARDIAC CATHETERIZATION N/A 04/10/2017    Procedure: Left Heart Cath;  Surgeon: Marjorie Healy MD;  Location: [facility] CATH INVASIVE LOCATION;  Service:     CARDIAC CATHETERIZATION N/A 04/10/2017    Procedure: Coronary angiography;  Surgeon: Marjorie Healy MD;  [facility];  Service:     CARDIAC CATHETERIZATION N/A 04/10/2017    Procedure: Left ventriculography;  Surgeon: Marjorie Healy MD;  Location: [facility] CATH INVASIVE LOCATION;  Service:     CARDIAC CATHETERIZATION  2011    CARDIAC ELECTROPHYSIOLOGY PROCEDURE N/A 04/18/2017    Procedure: Ablation atrial flutter;  Surgeon: Jose Antonio Gustafson MD;  Location: [facility] CATH INVASIVE LOCATION;  Service:     CAROTID ENDARTERECTOMY      CHOLECYSTECTOMY      CHOLECYSTECTOMY WITH INTRAOPERATIVE CHOLANGIOGRAM N/A 09/07/2019    Procedure: Laparoscopic cholecystectomy with intraoperative cholangiogram;  Surgeon: Gauri Travis MD;  Location: [facility] MAIN OR;  " Service: General    COLONOSCOPY  01/06/2015    Diverticulosis, one TA    COLONOSCOPY N/A 02/14/2019    tics, NBIH, adenomatous polyp x 2    COLONOSCOPY N/A 11/05/2021    Procedure: COLONOSCOPY TO CECUM AND TERM. ILEUM WITH COLD POLYPECTOMIES;  Surgeon: Everton Abel MD;  Location: [facility] ENDOSCOPY;  Service: Gastroenterology;  Laterality: N/A;  PRE OP - PERS H/O POLYPS  POST OP - COLON POLYPS,, DIVERTICULOSIS, HEMORRHOIDS    CORONARY ARTERY BYPASS GRAFT N/A 04/11/2017    Procedure: AR STERNOTOMY CORONARY ARTERY BYPASS GRAFT TIMES 3 USING LEFT INTERNAL MAMMARY ARTERY AND LEFT GREATER SAPHENOUS VEIN GRAFT PER ENDOSCOPIC VEIN HARVESTING AND PRP ;  Surgeon: Temo Cortez MD;  Location: [facility] MAIN OR;  Service:     ENDOSCOPY  01/06/2015    HH, Ervin's esophagus    ENDOSCOPY N/A 02/14/2019    Z line irregular, HH, Ervin's esophagus    ENDOSCOPY N/A 11/05/2021    Procedure: ESOPHAGOGASTRODUODENOSCOPY WITH BIOPSIES;  Surgeon: Everton Abel MD;  Location: [facility] ENDOSCOPY;  Service: Gastroenterology;  Laterality: N/A;  PRE OP - PERS H/O ERVIN'S  POST OP - IRREG Z LINE    ENDOSCOPY W/ PEG TUBE PLACEMENT N/A 11/6/2023    Procedure: ESOPHAGOGASTRODUODENOSCOPY WITH PERCUTANEOUS ENDOSCOPIC GASTROSTOMY TUBE INSERTION;  Surgeon: Temo Ramsey MD;  Location: [facility] ENDOSCOPY;  Service: General;  Laterality: N/A;  Pre: dysphagia  Post: same    KNEE SURGERY Left     URETEROSCOPY LASER LITHOTRIPSY WITH STENT INSERTION Left 8/23/2022    Procedure: Cysto retrograde with left uretro stent placement;  Surgeon: Damien Oliveira MD;  Location: [facility] MAIN OR;  Service: Urology;  Laterality: Left;    VASECTOMY        General Information       Row Name 11/27/23 1406          Physical Therapy Time and Intention    Document Type therapy note (daily note)  -     Mode of Treatment individual therapy;physical therapy  -               User Key  (r) = Recorded By, (t) = Taken By, (c) = Cosigned By      Initials  Name Provider Type    Monica Douglas, PT Physical Therapist                   Mobility       Row Name 11/27/23 1404          Bed Mobility    Supine-Sit Powell (Bed Mobility) moderate assist (50% patient effort);2 person assist  -     Sit-Supine Powell (Bed Mobility) maximum assist (25% patient effort);2 person assist  -     Assistive Device (Bed Mobility) draw sheet;bed rails  -       Row Name 11/27/23 1404          Sit-Stand Transfer    Sit-Stand Powell (Transfers) minimum assist (75% patient effort);2 person assist  -     Assistive Device (Sit-Stand Transfers) walker, front-wheeled  -     Comment, (Sit-Stand Transfer) STS x 3 from elevated bed surface  -       Row Name 11/27/23 1403          Gait/Stairs (Locomotion)    Powell Level (Gait) minimum assist (75% patient effort);moderate assist (50% patient effort);2 person assist  -     Distance in Feet (Gait) 1 step Forward/Backwards, 2 steps F/B, 3 sidesteps to HOB  -     Deviations/Abnormal Patterns (Gait) antalgic;stride length decreased;gait speed decreased  -               User Key  (r) = Recorded By, (t) = Taken By, (c) = Cosigned By      Initials Name Provider Type    Monica Douglas, PT Physical Therapist                   Obj/Interventions       Row Name 11/27/23 1407          Motor Skills    Therapeutic Exercise --  BLE AP, HS, SAQ x 10 reps, hamstring stretch BLE 2x 20 sec  -       Row Name 11/27/23 1403          Balance    Balance Assessment sitting static balance;sitting dynamic balance;standing static balance;standing dynamic balance  -     Static Sitting Balance contact guard  -     Dynamic Sitting Balance minimal assist  -     Position, Sitting Balance unsupported;sitting edge of bed  -     Static Standing Balance minimal assist;2-person assist  -     Dynamic Standing Balance moderate assist;2-person assist  -     Position/Device Used, Standing Balance walker, front-wheeled   -               User Key  (r) = Recorded By, (t) = Taken By, (c) = Cosigned By      Initials Name Provider Type    Monica Douglas, PT Physical Therapist                   Goals/Plan    No documentation.                  Clinical Impression       Row Name 11/27/23 1409          Pain    Pretreatment Pain Rating 7/10  -     Posttreatment Pain Rating 8/10  -     Pain Location - buttock;back  -       Row Name 11/27/23 1409          Plan of Care Review    Plan of Care Reviewed With patient  -     Outcome Evaluation Pt sleeping when PT arrived. Family present. Pt awoke easily and was agreeable to therapy. Mod-max A for bed mobility. Dizziness upon sitting but resolved quickly. Leans R at times as he fatigues. Min A x2 to stand with Rwx. Clears the bed but remains crouched. Took a step forward and back on first stand, 2 steps forward and backwards on second stand and steps to HOB on third stand. Fatigues quickly and requires frequent rests. Difficulty advancing R foot more than L foot.  -       Row Name 11/27/23 1409          Therapy Assessment/Plan (PT)    Rehab Potential (PT) fair, will monitor progress closely  -     Criteria for Skilled Interventions Met (PT) yes;meets criteria;skilled treatment is necessary  -     Therapy Frequency (PT) 5 times/wk  -       Row Name 11/27/23 1409          Positioning and Restraints    Pre-Treatment Position in bed  -     Post Treatment Position bed  -     In Bed fowlers;call light within reach;exit alarm on;patient within staff view;with family/caregiver;notified [facility]  -               User Key  (r) = Recorded By, (t) = Taken By, (c) = Cosigned By      Initials Name Provider Type    Monica Douglas, PT Physical Therapist                   Outcome Measures       Row Name 11/27/23 1414          How much help from another person do you currently need...    Turning from your back to your side while in flat bed without using bedrails? 2  -CHARLES      Moving from lying on back to sitting on the side of a flat bed without bedrails? 2  -KH     Moving to and from a bed to a chair (including a wheelchair)? 2  -KH     Standing up from a chair using your arms (e.g., wheelchair, bedside chair)? 3  -KH     Climbing 3-5 steps with a railing? 1  -KH     To walk in hospital room? 2  -KH     AM-PAC 6 Clicks Score (PT) 12  -KH     Highest Level of Mobility Goal 4 --> Transfer to chair/commode  -       Row Name 11/27/23 1414          Functional Assessment    Outcome Measure Options AM-PAC 6 Clicks Basic Mobility (PT)  -               User Key  (r) = Recorded By, (t) = Taken By, (c) = Cosigned By      Initials Name Provider Type     Monica Carr, PT Physical Therapist                                 Physical Therapy Education       Title: PT OT SLP Therapies (Done)       Topic: Physical Therapy (Done)       Point: Mobility training (Done)       Learning Progress Summary             Patient Acceptance, E,D, VU,NR by  at 11/24/2023 0927    Acceptance, E,TB, VU by  at 11/22/2023 1748                         Point: Home exercise program (Done)       Learning Progress Summary             Patient Acceptance, E,D, VU,NR by  at 11/24/2023 0927    Acceptance, E,TB, VU by  at 11/22/2023 1748                         Point: Body mechanics (Done)       Learning Progress Summary             Patient Acceptance, E,D, VU,NR by EE at 11/24/2023 0927    Acceptance, E,TB, VU by  at 11/22/2023 1748                         Point: Precautions (Done)       Learning Progress Summary             Patient Acceptance, E,TB, VU by  at 11/22/2023 1748                                         User Key       Initials Effective Dates Name Provider Type Discipline    EE 06/16/21 -  Tawana Weeks, PT Physical Therapist PT    KW 10/17/23 -  Nancy Garcia, RN Registered Nurse Nurse                  PT Recommendation and Plan     Plan of Care Reviewed With: patient  Outcome Evaluation:  Pt sleeping when PT arrived. Family present. Pt awoke easily and was agreeable to therapy. Mod-max A for bed mobility. Dizziness upon sitting but resolved quickly. Leans R at times as he fatigues. Min A x2 to stand with Rwx. Clears the bed but remains crouched. Took a step forward and back on first stand, 2 steps forward and backwards on second stand and steps to HOB on third stand. Fatigues quickly and requires frequent rests. Difficulty advancing R foot more than L foot.     Time Calculation:         PT Charges       Row Name 11/27/23 1421             Time Calculation    Start Time 1340  -KH      Stop Time 1415  -KH      Time Calculation (min) 35 min  -KH      PT Received On 11/27/23  -      PT - Next Appointment 11/28/23  -         Time Calculation- PT    Total Timed Code Minutes- PT 35 minute(s)  -KH         Timed Charges    13569 - PT Therapeutic Exercise Minutes 15  -KH      35423 - PT Therapeutic Activity Minutes 20  -KH         Total Minutes    Timed Charges Total Minutes 35  -KH       Total Minutes 35  -KH                User Key  (r) = Recorded By, (t) = Taken By, (c) = Cosigned By      Initials Name Provider Type    Monica Douglas, PT Physical Therapist                  Therapy Charges for Today       Code Description Service Date Service Provider Modifiers Qty    09008798049  PT THER PROC EA 15 MIN 11/27/2023 Monica Carr, PT GP 1    [id number]  PT THERAPEUTIC ACT EA 15 MIN 11/27/2023 Monica Carr PT GP 1            PT G-Codes  Outcome Measure Options: AM-PAC 6 Clicks Basic Mobility (PT)  AM-PAC 6 Clicks Score (PT): 12  AM-PAC 6 Clicks Score (OT): 9  PT Discharge Summary  Anticipated Discharge Disposition (PT): skilled nursing facility    Monica Carr PT  11/27/2023

## 2023-11-27 NOTE — PLAN OF CARE
Goal Outcome Evaluation:  Plan of Care Reviewed With: patient           Outcome Evaluation: Pt sleeping when PT arrived. Family present. Pt awoke easily and was agreeable to therapy. Mod-max A for bed mobility. Dizziness upon sitting but resolved quickly. Leans R at times as he fatigues. Min A x2 to stand with Rwx. Clears the bed but remains crouched. Took a step forward and back on first stand, 2 steps forward and backwards on second stand and steps to HOB on third stand. Fatigues quickly and requires frequent rests. Difficulty advancing R foot more than L foot.      Anticipated Discharge Disposition (PT): skilled nursing facility

## 2023-11-28 LAB
ANION GAP SERPL CALCULATED.3IONS-SCNC: 9 MMOL/L (ref 5–15)
BUN SERPL-MCNC: 20 MG/DL (ref 8–23)
BUN/CREAT SERPL: 38.5 (ref 7–25)
CALCIUM SPEC-SCNC: 8.7 MG/DL (ref 8.6–10.5)
CHLORIDE SERPL-SCNC: 102 MMOL/L (ref 98–107)
CO2 SERPL-SCNC: 25 MMOL/L (ref 22–29)
CREAT SERPL-MCNC: 0.52 MG/DL (ref 0.76–1.27)
DEPRECATED RDW RBC AUTO: 62.1 FL (ref 37–54)
EGFRCR SERPLBLD CKD-EPI 2021: 103.2 ML/MIN/1.73
ERYTHROCYTE [DISTWIDTH] IN BLOOD BY AUTOMATED COUNT: 16.5 % (ref 12.3–15.4)
GLUCOSE SERPL-MCNC: 114 MG/DL (ref 65–99)
HCT VFR BLD AUTO: 30.6 % (ref 37.5–51)
HGB BLD-MCNC: 9.7 G/DL (ref 13–17.7)
LYMPHOCYTES # BLD MANUAL: 0.67 10*3/MM3 (ref 0.7–3.1)
LYMPHOCYTES NFR BLD MANUAL: 4.1 % (ref 5–12)
MCH RBC QN AUTO: 33.1 PG (ref 26.6–33)
MCHC RBC AUTO-ENTMCNC: 31.7 G/DL (ref 31.5–35.7)
MCV RBC AUTO: 104.4 FL (ref 79–97)
METAMYELOCYTES NFR BLD MANUAL: 1 % (ref 0–0)
MONOCYTES # BLD: 0.39 10*3/MM3 (ref 0.1–0.9)
MYELOCYTES NFR BLD MANUAL: 4.1 % (ref 0–0)
NEUTROPHILS # BLD AUTO: 7.95 10*3/MM3 (ref 1.7–7)
NEUTROPHILS NFR BLD MANUAL: 83.7 % (ref 42.7–76)
NRBC SPEC MANUAL: 2 /100 WBC (ref 0–0.2)
PLAT MORPH BLD: NORMAL
PLATELET # BLD AUTO: 335 10*3/MM3 (ref 140–450)
PMV BLD AUTO: 10.2 FL (ref 6–12)
POTASSIUM SERPL-SCNC: 4.2 MMOL/L (ref 3.5–5.2)
RBC # BLD AUTO: 2.93 10*6/MM3 (ref 4.14–5.8)
RBC MORPH BLD: NORMAL
RETICS # AUTO: 0.23 10*6/MM3 (ref 0.02–0.13)
RETICS/RBC NFR AUTO: 7.88 % (ref 0.7–1.9)
SMUDGE CELLS BLD QL SMEAR: ABNORMAL
SODIUM SERPL-SCNC: 136 MMOL/L (ref 136–145)
VARIANT LYMPHS NFR BLD MANUAL: 7.1 % (ref 19.6–45.3)
WBC NRBC COR # BLD AUTO: 9.5 10*3/MM3 (ref 3.4–10.8)

## 2023-11-28 PROCEDURE — 97110 THERAPEUTIC EXERCISES: CPT

## 2023-11-28 PROCEDURE — 85045 AUTOMATED RETICULOCYTE COUNT: CPT | Performed by: INTERNAL MEDICINE

## 2023-11-28 PROCEDURE — 99231 SBSQ HOSP IP/OBS SF/LOW 25: CPT | Performed by: INTERNAL MEDICINE

## 2023-11-28 PROCEDURE — 80048 BASIC METABOLIC PNL TOTAL CA: CPT | Performed by: INTERNAL MEDICINE

## 2023-11-28 PROCEDURE — 97530 THERAPEUTIC ACTIVITIES: CPT

## 2023-11-28 PROCEDURE — 85007 BL SMEAR W/DIFF WBC COUNT: CPT | Performed by: INTERNAL MEDICINE

## 2023-11-28 PROCEDURE — 85025 COMPLETE CBC W/AUTO DIFF WBC: CPT | Performed by: INTERNAL MEDICINE

## 2023-11-28 PROCEDURE — 99232 SBSQ HOSP IP/OBS MODERATE 35: CPT | Performed by: INTERNAL MEDICINE

## 2023-11-28 RX ADMIN — HYDROCODONE BITARTRATE AND ACETAMINOPHEN 1 TABLET: 5; 325 TABLET ORAL at 18:45

## 2023-11-28 RX ADMIN — LANSOPRAZOLE 30 MG: 15 TABLET, ORALLY DISINTEGRATING ORAL at 18:44

## 2023-11-28 RX ADMIN — APIXABAN 2.5 MG: 2.5 TABLET, FILM COATED ORAL at 11:03

## 2023-11-28 RX ADMIN — APIXABAN 2.5 MG: 2.5 TABLET, FILM COATED ORAL at 21:06

## 2023-11-28 RX ADMIN — ACETAMINOPHEN 650 MG: 160 SOLUTION ORAL at 21:06

## 2023-11-28 RX ADMIN — AMITRIPTYLINE HYDROCHLORIDE 50 MG: 50 TABLET, FILM COATED ORAL at 21:06

## 2023-11-28 RX ADMIN — HYDROCODONE BITARTRATE AND ACETAMINOPHEN 1 TABLET: 5; 325 TABLET ORAL at 11:03

## 2023-11-28 RX ADMIN — SUCRALFATE 1 G: 1 TABLET ORAL at 06:42

## 2023-11-28 RX ADMIN — AMLODIPINE BESYLATE 5 MG: 5 TABLET ORAL at 11:03

## 2023-11-28 RX ADMIN — ANORECTAL OINTMENT 1 APPLICATION: 15.7; .44; 24; 20.6 OINTMENT TOPICAL at 11:04

## 2023-11-28 RX ADMIN — LANSOPRAZOLE 30 MG: 15 TABLET, ORALLY DISINTEGRATING ORAL at 06:42

## 2023-11-28 RX ADMIN — ANORECTAL OINTMENT 1 APPLICATION: 15.7; .44; 24; 20.6 OINTMENT TOPICAL at 21:06

## 2023-11-28 RX ADMIN — SUCRALFATE 1 G: 1 TABLET ORAL at 18:44

## 2023-11-28 NOTE — PROGRESS NOTES
[facility]  Inpatient Progress Note    Reason for Followup:  Anemia    Subjective     Interval History:   No new issues,     Current Facility-Administered Medications:     acetaminophen (TYLENOL) tablet 650 mg, 650 mg, Per G Tube, Q4H PRN **OR** acetaminophen (TYLENOL) 160 MG/5ML oral solution 650 mg, 650 mg, Per G Tube, Q4H PRN, 650 mg at 11/27/23 2126 **OR** acetaminophen (TYLENOL) suppository 650 mg, 650 mg, Rectal, Q4H PRN, Michael Griffin MD    amitriptyline (ELAVIL) tablet 50 mg, 50 mg, Per G Tube, Nightly, Michael Griffin MD, 50 mg at 11/27/23 2123    amLODIPine (NORVASC) tablet 5 mg, 5 mg, Per G Tube, Daily, Michael Griffin MD, 5 mg at 11/27/23 1021    apixaban (ELIQUIS) tablet 2.5 mg, 2.5 mg, Oral, Q12H, Hayden Morales MD, 2.5 mg at 11/27/23 2123    sennosides-docusate (PERICOLACE) 8.6-50 MG per tablet 2 tablet, 2 tablet, Per G Tube, BID PRN **AND** polyethylene glycol (MIRALAX) packet 17 g, 17 g, Per G Tube, Daily PRN, 17 g at 11/23/23 1658 **AND** bisacodyl (DULCOLAX) EC tablet 5 mg, 5 mg, Oral, Daily PRN **AND** bisacodyl (DULCOLAX) suppository 10 mg, 10 mg, Rectal, Daily PRN, Michael Griffin MD, 10 mg at 11/23/23 1705    Calcium Replacement - Follow Nurse / BPA Driven Protocol, , Does not apply, PRN, Michael Griffin MD    HYDROcodone-acetaminophen (NORCO) 5-325 MG per tablet 1 tablet, 1 tablet, Per PEG Tube, Q6H PRN, Hayden Morales MD, 1 tablet at 11/27/23 2309    ipratropium-albuterol (DUO-NEB) nebulizer solution 3 mL, 3 mL, Nebulization, Q4H PRN, Tadeo Handy MD    lansoprazole (PREVACID SOLUTAB) disintegrating tablet Tablet Delayed Release Dispersible 30 mg, 30 mg, Per G Tube, BID AC, Hayden Morales MD, 30 mg at 11/28/23 0642    Magnesium Standard Dose Replacement - Follow Nurse / BPA Driven Protocol, , Does not apply, PRN, Michael Griffin MD    Menthol-Zinc Oxide 1 application , 1 application , Topical, Q12H, Carmen,  Hayden Munoz MD, 1 application  at 11/27/23 2131    nitroglycerin (NITROSTAT) SL tablet 0.4 mg, 0.4 mg, Sublingual, Q5 Min PRN, Roque Veras APRN    ondansetron (ZOFRAN) tablet 4 mg, 4 mg, Per G Tube, Q6H PRN **OR** ondansetron (ZOFRAN) injection 4 mg, 4 mg, Intravenous, Q6H PRN, Michael Griffin MD    Phosphorus Replacement - Follow Nurse / BPA Driven Protocol, , Does not apply, PRN, Michael Griffin MD    Potassium Replacement - Follow Nurse / BPA Driven Protocol, , Does not apply, PRGaby PHAM Patrick D, MD    sucralfate (CARAFATE) tablet 1 g, 1 g, Per G Tube, BID AC, Michael Griffin MD, 1 g at 11/28/23 0642    Objective     Vital Signs  Temp:  [97.3 °F (36.3 °C)-98.1 °F (36.7 °C)] 97.3 °F (36.3 °C)  Heart Rate:  [86-93] 93  Resp:  [18] 18  BP: (121-139)/(54-69) 121/54  Body mass index is 24.78 kg/m².  No intake or output data in the 24 hours ending 11/28/23 0737    No intake/output data recorded.     Physical Exam:   General: patient awake, alert and cooperative   Abdomen: PEG site clean/dry     Results Review:     I reviewed the patient's new clinical results.    Results from last 7 days   Lab Units 11/27/23  0646 11/26/23  0702 11/25/23  0654   WBC 10*3/mm3 9.77 8.88 8.92   HEMOGLOBIN g/dL 9.2* 9.3* 8.5*   HEMATOCRIT % 29.1* 28.9* 26.8*   PLATELETS 10*3/mm3 311 366 406     Results from last 7 days   Lab Units 11/27/23  0646 11/26/23  0702 11/25/23  0654 11/24/23  0612 11/23/23  2039 11/23/23  0815 11/23/23  0615 11/22/23  0955   SODIUM mmol/L 136 135* 134* 140  --  136   < > 135*   POTASSIUM mmol/L 4.0 4.1 4.3 4.2   < > 3.1*   < > 3.3*   CHLORIDE mmol/L 103 101 103 106  --  101   < > 95*   CO2 mmol/L 23.5 26.0 24.8 25.5  --  29.8*   < > 32.5*   BUN mg/dL 19 17 18 18  --  14   < > 18   CREATININE mg/dL 0.60* 0.53* 0.59* 0.54*  --  0.58*   < > 0.69*   CALCIUM mg/dL 8.4* 8.4* 8.1* 8.2*  --  8.1*   < > 8.3*   BILIRUBIN mg/dL  --   --   --  0.8  --  0.9  --  0.7   ALK PHOS U/L  --   --   --  235*   --  207*  --  269*   ALT (SGPT) U/L  --   --   --  52*  --  50*  --  66*   AST (SGOT) U/L  --   --   --  62*  --  51*  --  69*   GLUCOSE mg/dL 126* 92 126* 101*  --  87   < > 122*    < > = values in this interval not displayed.     Results from last 7 days   Lab Units 11/25/23  1712   INR  1.13*     Lab Results   Lab Value Date/Time    LIPASE 11 (L) 01/26/2020 1445    LIPASE 12 (L) 09/06/2019 0932    LIPASE 25 01/27/2019 2236       Radiology:  [unfilled]      Assessment & Plan   Assessment:   Acute on chronic anemia  History of GERD with Mandel's esophagus  Status postgastrostomy placement 11/6/2023  Debility  History of CVA on chronic anticoagulation    All problems new to me today    Plan:   Suspect anemia multifactorial.  Hb stable.   Recent EGD showed no acute abnormalities, CT A/P NAD as well  GI will sign off      I discussed the patient's findings and my recommendations with patient.          Everton Li M.D.  [facility]  [address] [place] [address]  Office: [phone]

## 2023-11-28 NOTE — PLAN OF CARE
Goal Outcome Evaluation:  Plan of Care Reviewed With: patient, spouse        Progress: improving  Outcome Evaluation: Pt was able to participate in OT today, he requires heavy assist X1 to get to EOB and pivot to the chair today. Pt is generally weak and completed UE exercises in the chair. He requires total A for all LB ADLs at this time. His spouse is happy to see pt sitting up as he has not been able to tolerate any OOB activity this hospial stay. OT educated pt and spouse on AAROM HEP to complete during the day to improve strength/ROM.      Anticipated Discharge Disposition (OT): skilled nursing facility

## 2023-11-28 NOTE — PLAN OF CARE
Problem: Adult Inpatient Plan of Care  Goal: Plan of Care Review  Outcome: Ongoing, Progressing  Goal: Patient-Specific Goal (Individualized)  Outcome: Ongoing, Progressing  Goal: Absence of Hospital-Acquired Illness or Injury  Outcome: Ongoing, Progressing  Intervention: Identify and Manage Fall Risk  Recent Flowsheet Documentation  Taken 11/27/2023 2000 by Luisa Ozuna RN  Safety Promotion/Fall Prevention:   safety round/check completed   nonskid shoes/slippers when out of bed   lighting adjusted   fall prevention program maintained   clutter free environment maintained   activity supervised  Intervention: Prevent Skin Injury  Recent Flowsheet Documentation  Taken 11/27/2023 2000 by Luisa Ozuna RN  Body Position: turned  Skin Protection:   adhesive use limited   incontinence pads utilized   transparent dressing maintained   tubing/devices free from skin contact  Intervention: Prevent and Manage VTE (Venous Thromboembolism) Risk  Recent Flowsheet Documentation  Taken 11/27/2023 2000 by Luisa Ozuna RN  Activity Management: activity encouraged  VTE Prevention/Management: compression stockings on  Intervention: Prevent Infection  Recent Flowsheet Documentation  Taken 11/27/2023 2000 by Luisa Ozuna RN  Infection Prevention:   visitors restricted/screened   single patient room provided   rest/sleep promoted   personal protective equipment utilized   hand hygiene promoted  Goal: Optimal Comfort and Wellbeing  Outcome: Ongoing, Progressing  Intervention: Monitor Pain and Promote Comfort  Recent Flowsheet Documentation  Taken 11/27/2023 2000 by Luisa Ozuna RN  Pain Management Interventions:   see MAR   quiet environment facilitated   position adjusted   pillow support provided   diversional activity provided   care clustered  Intervention: Provide Person-Centered Care  Recent Flowsheet Documentation  Taken 11/27/2023 2000 by Luisa Ozuna RN  Trust Relationship/Rapport:   care explained    choices provided   emotional support provided   empathic listening provided   questions answered  Goal: Readiness for Transition of Care  Outcome: Ongoing, Progressing     Problem: Heart Failure Comorbidity  Goal: Maintenance of Heart Failure Symptom Control  Outcome: Ongoing, Progressing  Intervention: Maintain Heart Failure-Management  Recent Flowsheet Documentation  Taken 11/27/2023 2000 by Luisa Ozuna RN  Medication Review/Management: medications reviewed     Problem: Hypertension Comorbidity  Goal: Blood Pressure in Desired Range  Outcome: Ongoing, Progressing  Intervention: Maintain Blood Pressure Management  Recent Flowsheet Documentation  Taken 11/27/2023 2000 by Luisa Ozuna RN  Medication Review/Management: medications reviewed     Problem: Skin Injury Risk Increased  Goal: Skin Health and Integrity  Outcome: Ongoing, Progressing  Intervention: Optimize Skin Protection  Recent Flowsheet Documentation  Taken 11/27/2023 2000 by Luisa Ozuna RN  Pressure Reduction Techniques:   frequent weight shift encouraged   rest period provided between sit times   weight shift assistance provided  Head of Bed (HOB) Positioning: HOB elevated  Pressure Reduction Devices:   pressure-redistributing mattress utilized   alternating pressure pump (ADD)   heel offloading device utilized  Skin Protection:   adhesive use limited   incontinence pads utilized   transparent dressing maintained   tubing/devices free from skin contact     Problem: Fall Injury Risk  Goal: Absence of Fall and Fall-Related Injury  Outcome: Ongoing, Progressing  Intervention: Identify and Manage Contributors  Recent Flowsheet Documentation  Taken 11/27/2023 2000 by Luisa Ozuna RN  Medication Review/Management: medications reviewed  Intervention: Promote Injury-Free Environment  Recent Flowsheet Documentation  Taken 11/27/2023 2000 by Luisa Ozuna RN  Safety Promotion/Fall Prevention:   safety round/check completed   nonskid  shoes/slippers when out of bed   lighting adjusted   fall prevention program maintained   clutter free environment maintained   activity supervised     Problem: Adjustment to Illness (Sepsis/Septic Shock)  Goal: Optimal Coping  Outcome: Ongoing, Progressing     Problem: Bleeding (Sepsis/Septic Shock)  Goal: Absence of Bleeding  Outcome: Ongoing, Progressing  Intervention: Monitor and Manage Bleeding  Recent Flowsheet Documentation  Taken 11/27/2023 2000 by Luisa Ozuna RN  Bleeding Management: dressing monitored     Problem: Glycemic Control Impaired (Sepsis/Septic Shock)  Goal: Blood Glucose Level Within Desired Range  Outcome: Ongoing, Progressing  Intervention: Optimize Glycemic Control  Recent Flowsheet Documentation  Taken 11/27/2023 2000 by Luisa Ozuna RN  Glycemic Management: blood glucose monitored     Problem: Infection Progression (Sepsis/Septic Shock)  Goal: Absence of Infection Signs and Symptoms  Outcome: Ongoing, Progressing  Intervention: Initiate Sepsis Management  Recent Flowsheet Documentation  Taken 11/27/2023 2000 by Luisa Ozuna, RN  Infection Prevention:   visitors restricted/screened   single patient room provided   rest/sleep promoted   personal protective equipment utilized   hand hygiene promoted  Isolation Precautions: precautions maintained  Intervention: Promote Recovery  Recent Flowsheet Documentation  Taken 11/27/2023 2000 by Luisa Ozuna, RN  Activity Management: activity encouraged  Sleep/Rest Enhancement: regular sleep/rest pattern promoted     Problem: Nutrition Impaired (Sepsis/Septic Shock)  Goal: Optimal Nutrition Intake  Outcome: Ongoing, Progressing   Goal Outcome Evaluation:

## 2023-11-28 NOTE — CASE MANAGEMENT/SOCIAL WORK
Continued Stay Note  [facility]     Patient Name: Madhu Santoro  MRN: [id number]  Today's Date: 11/28/2023    Admit Date: 11/22/2023    Plan: SNF at [facility]   Discharge Plan       Row Name 11/28/23 1559       Plan    Plan SNF at [facility]    Patient/Family in Agreement with Plan yes    Plan Comments Spoke with Dr. Morales, pt ready for d/c in am, spoke with Yasmin/Cristino, pt has bed at [facility] and can come tomorrow, scheduled BHEMS for tomorrow at 11am, form on pkt. Pharmacy correct, updated pt wife Brooke by phone, asked about EMS DNR form, she is not worried about DNR status during transport. Updated MD and RN and Yasmin of transport timing. CCP will follow - Trang MACHUCA                   Discharge Codes    No documentation.                 Expected Discharge Date and Time       Expected Discharge Date Expected Discharge Time    Nov 29, 2023               Trang Carter RN

## 2023-11-28 NOTE — PROGRESS NOTES
Subjective   REASON FOR FOLLOW-UP: Polycythemia vera, JAK2 mutation detected    History of Present Illness  Patient is a 77-year-old male with follow-up in the office for polycythemia vera on low-dose hydroxyurea recently hospitalized with COVID-pneumonia and discharged to the nursing home readmitted with anemia hemoglobin of 7.3.  There is no mention made of obvious bleeding the patient denies bleeding but I am not sure he is aware of the color of his stool etc.  Reticulocyte count was unexpectedly high at 11% which suggest hemolysis although his acute bleeding this can be seen.  Iron saturation 19% ferritin 566 B12 and folate normal     Patient has had transfusion and feels much better.  Of note he has been off hydroxyurea in the nursing home since discharge so this is not playing a role.  His hemoglobin at discharge from 11/9/2023 was 8.8 and the only procedure he has had done placement of the PEG tube which is unlikely to cause significant bleeding.     His daughter states he is complaining of dyspepsia and heartburn in his concern for GI bleed.  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  The patient appears depressed and globally weak.  He and his wife both frustrated by lack of progress with respect to physical therapy and ability to eat    11/28  Patient feels the same-very weak.  No significant shortness of breath  lightheadedness dizziness.  CBC is pending currently.    Past Medical History, Past Surgical History, Social History, Family History have been reviewed and are without significant changes except as mentioned.    Review of Systems   Constitutional:  Positive for activity change, appetite change and fatigue. Negative for fever.   HENT:  Positive for trouble swallowing.    Respiratory:  Negative for cough and shortness of breath.    Gastrointestinal:  Negative for diarrhea and vomiting.   Endocrine: Negative.    Musculoskeletal:  Positive for gait problem.   Skin: Negative.    Neurological:  Positive for weakness.   Psychiatric/Behavioral:  Positive for dysphoric mood.    Unchanged-11/28/2023  A comprehensive 14 point review of systems was performed and was negative except as mentioned.    Medications:  The current medication list was reviewed in the EMR    ALLERGIES:    Allergies   Allergen Reactions    Atorvastatin Myalgia     Myalgia    Penicillins Hives, Swelling and Rash     Tolerates cephalosporins        Objective      Vitals:    11/27/23 1245 11/27/23 1945 11/27/23 2231 11/28/23 0745   BP: 139/69 121/63 121/54 135/85   BP Location: Right arm Left arm Left arm Right arm   Patient Position: Lying Lying Lying Lying   Pulse: 89 93  95   Resp: 18 18 18 18   Temp: 97.7 °F (36.5 °C) 98.1 °F (36.7 °C) 97.3 °F (36.3 °C) 99.3 °F (37.4 °C)   TempSrc: Oral Oral Oral Oral   SpO2: 94% 96%  95%   Weight:       Height:             10/20/2023     1:51 PM   Current Status   ECOG score 0       Physical Exam    CONSTITUTIONAL: pleasant elderly man  HEENT: no icterus, no thrush, moist membranes  LYMPH: no cervical or supraclavicular lad  CV: RRR, S1S2, no murmur  RESP: cta bilat, no wheezing, no rales  GI: soft, non-tender,  +bs  MUSC: no edema   NEURO: alert and oriented x3, significant global weakness  PSYCH: Dysphoric/depressed mood and affect  Unchanged-11/28/2023    RECENT LABS:  Hematology WBC   Date Value Ref Range Status    11/27/2023 9.77 3.40 - 10.80 10*3/mm3 Final     RBC   Date Value Ref Range Status   11/27/2023 2.81 (L) 4.14 - 5.80 10*6/mm3 Final     Hemoglobin   Date Value Ref Range Status   11/27/2023 9.2 (L) 13.0 - 17.7 g/dL Final     Hematocrit   Date Value Ref Range Status   11/27/2023 29.1 (L) 37.5 - 51.0 % Final     Platelets   Date Value Ref Range Status   11/27/2023 311 140 - 450 10*3/mm3 Final     Lab Results   Component Value Date    GLUCOSE 114 (H) 11/28/2023    BUN 20 11/28/2023    CREATININE 0.52 (L) 11/28/2023    EGFRRESULT 65.8 07/03/2023    EGFR 103.2 11/28/2023    BCR 38.5 (H) 11/28/2023    K 4.2 11/28/2023    CO2 25.0 11/28/2023    CALCIUM 8.7 11/28/2023    PROTENTOTREF 6.9 10/20/2023    ALBUMIN 2.3 (L) 11/24/2023    BILITOT 0.8 11/24/2023    AST 62 (H) 11/24/2023    ALT 52 (H) 11/24/2023            Venous duplex bilateral lower extremities 11/26/2023-normal exam    Assessment & Plan     *Hospitalized in mid October to early November with COVID-pneumonia with profound deconditioning discharged to nursing home  Readmitted 11/22/2023 with anemia-patient has not been on hydroxyurea at discharge from the hospital and remains on Eliquis  MCV is dropped significantly suggesting iron depletion and blood loss (but also may be related to being off Hydrea)  Patient himself denies hematochezia or melena  B12 folate normal ferritin 566 iron saturation 12% reticulocyte count 9% rule out hemolysis although acute blood loss can also cause an elevated reticulocyte  LDH elevated 356 haptoglobin low at 24-suggest hemolysis suggest hemolysis   Crossmatch compatible suggesting indirect claudia neg  Direct claudia Complement +?  Cold agglutinin versus PNH-PNH profile and cold agglutinin titer pending  Hemoglobin stable 11/27/2023-9.2  CT abdomen/pelvis 11/27/2023-suggestive of lower lobe pneumonia, stable pancreatic lesions probable IPMN, no evidence of bleeding     *Polycythemia vera  Patient initially presented to hematology  August 2022 for thrombocytosis with review of his record over the last year demonstrating a platelet count that is escalated from 3-400,000 now to 8/11/2022 648,000 but concurrent microcytic and hypochromic indices.  These indices have been slowly reducing over the last 2 years approximately followed more closely.  Concurrently is a mild drop in his baseline hemoglobin still well within normal limits.  thrombocytosis thought, initially, to be related to iron deficiency.  Ferritin found to be 16.3 and iron of 26 with 5% saturation and TIBC 511.   The patient was placed on ferrous gluconate which, unfortunately, he was unable to tolerate with worsening constipation.  He had further issues in early September with left renal stone, requiring cystoscopy, stent placement 8/23/2022.  9/28/2022 with repeat studies performed 9/21 demonstrating 5% iron saturation, ferritin of 12.5.  He remains further weight and fatigue, again oral iron intolerant and will require IV iron preparations for his iron deficiency anemia.  We discontinued oral iron and proceeded with Injectafer given 9/28/2022 in 10/5/2022  4/17/2023JAK  2-V617 F mutation having been detected.    In the interval he was admitted 2/19-21/23 for weakness, fall and ER evaluation not demonstrate any acute intracranial process, CT of lumbar spine with lumbar degenerative disease and other studies not show any acute abnormalities.  Fortunately he went on to improve though his wife had a positive COVID test recently on home examination.  4/10/2023 include an H&H of 18.3 and 60.9 white count of 14,700 platelet count of 477,000.  5/15/2023 hemoglobin 15.3, hematocrit 50.4.  Reviewed with Dr. Lopez plans to hold off on phlebotomy and initiate Hydrea 1000 mg daily.  We will tentatively schedule him for return follow-up visit in 2 weeks with repeat labs and reassessment.  6/1/2020 2:23 weeks of Hydrea 1000 mg daily, WBC 5.0, hemoglobin 15.2, hematocrit 49.8, platelets 113,000.   Hydrea was reduced to 500 mg daily  Stability of counts today, 6/15/2023 on 500 mg daily with WBC 4.81, hemoglobin 14.6, hematocrit 46.7%, platelets 156,000  Patient seen 6/30/2023 with H&H of 14.1 and 46.6 white count of 4800 and platelet count of 1 46,000.  Patient subsequently assessed including 9/21/2023 with H&H gradually dropping 11.1 and 32.9, white count of 5110, platelet count 151,000, .8.  10/20/2023 WBC 8.04, SNC 5.42, Hgb 13.1, Platelets 247,000.  Continue Hydrea 500 mg every other day.  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  daily CBC and retic  and transfuse for hemoglobin less than 8  4.  PT/OT  5.  No opposition to restarting Eliquis/aspirin from hematology perspective given negative CT abdomen and no indication of GI blood loss presently.                        11/28/2023      CC:

## 2023-11-28 NOTE — PLAN OF CARE
Goal Outcome Evaluation:  Plan of Care Reviewed With: patient, spouse           Outcome Evaluation: Pt UIC upon arrival after working w OT. Pt had been sitting UIC for 18 min according to wife and was ready to get back in bed. Pt c/o generalized pain and excessive fatigue, requesting pain medication from nursing during therapy session. Pt stood and pivoted w Rwx and min A x 2 to bed. Pt's BLE were very unsteady and weak upon standing and taking steps. Pt was agreeable to supine ther ex, req some assist for hip abduction on the R LE. Has trouble moving R LE. Pt's eyes were closed and was ready to rest at end of therapy today.      Anticipated Discharge Disposition (PT): skilled nursing facility

## 2023-11-28 NOTE — THERAPY TREATMENT NOTE
Patient Name: Madhu Santoro  : 1945    MRN: [id number]                              Today's Date: 2023       Admit Date: 2023    Visit Dx:     ICD-10-CM ICD-9-CM   1. Weakness  R53.1 780.79   2. Elevated troponin  R79.89 790.6   3. Hypoxia  R09.02 799.02     Patient Active Problem List   Diagnosis    Anxiety    Arthritis    Coronary artery disease due to lipid rich plaque    HLD (hyperlipidemia)    Benign essential hypertension    DDD (degenerative disc disease), lumbosacral    Cerebrovascular accident    Encounter for screening for cardiovascular disorders    Gastroesophageal reflux disease    Hyperlipidemia    Stenosis of carotid artery    Former smoker    History of cardiac catheterization    S/P ablation of atrial flutter    Typical atrial flutter    S/P CABG (coronary artery bypass graft)    Adenomatous polyp of ascending colon    Abdominal bloating    Stroke    PAD (peripheral artery disease)    Acute cholecystitis    Right upper quadrant abdominal pain    Chronic pain of both hips    Chronic bilateral low back pain without sciatica    Sacroiliac joint dysfunction of both sides    Encounter for long-term (current) use of high-risk medication    Lumbar facet arthropathy    Stroke-like symptoms    CVA (cerebral vascular accident)    Personal history of colonic polyps    Arthralgia of multiple joints    Iron deficiency    Thrombocytosis    Left ureteral stone    Obstructive uropathy    Chronic anticoagulation    Facial skin lesion    Iron deficiency anemia    Polycythemia    Neuropathy    Weakness    Pneumonia    Close exposure to COVID-19 virus    Polycythemia vera    Chronic gout without tophus    COVID-19    Cytokine release syndrome, grade 2    Anemia    History of CVA (cerebrovascular accident)    S/P percutaneous endoscopic gastrostomy (PEG) tube placement    Mandel esophagus    Elevated troponin    Leukocytosis    Sacral decubitus ulcer    Wound infection    Oral phase dysphagia     "Acute blood loss anemia     Past Medical History:   Diagnosis Date    Anxiety     Arthritis     Atrial flutter     Status post cavotricuspid isthmus ablation by Dr. Gustafson on 4/18/17    Mandel esophagus     Benign essential hypertension     CAD (coronary artery disease)     3 vessel CABG 4/11/17 by Dr. Cortez: ROSARIO-prox LAD, SVG-OM1, SVG-OM3    Carotid artery disease     Status post carotid endarterectomy - USG 4/10/17: 50-59% NICHELLE, 1-15% LICA.     Colonic polyp     Cyst of pancreas     DDD (degenerative disc disease), lumbosacral     GERD (gastroesophageal reflux disease)     H/O bone density study 2013    H/O complete eye exam 2014    History of kidney stone     HLD (hyperlipidemia)     Hypertension     Kidney stone     8/22/22    Lipid screening 05/31/2013    Low back pain     physical therapy [facility] 5[facility]    Screening for prostate cancer 07/07/2015    Skin cancer     nose    Stroke     RESIDUAL--\"BALANCE ISSUES\"     Past Surgical History:   Procedure Laterality Date    CARDIAC CATHETERIZATION N/A 04/10/2017    Procedure: Left Heart Cath;  Surgeon: Marjorie Healy MD;  Location: [facility] CATH INVASIVE LOCATION;  Service:     CARDIAC CATHETERIZATION N/A 04/10/2017    Procedure: Coronary angiography;  Surgeon: Marjorie Healy MD;  [facility];  Service:     CARDIAC CATHETERIZATION N/A 04/10/2017    Procedure: Left ventriculography;  Surgeon: Marjorie Healy MD;  Location: [facility] CATH INVASIVE LOCATION;  Service:     CARDIAC CATHETERIZATION  2011    CARDIAC ELECTROPHYSIOLOGY PROCEDURE N/A 04/18/2017    Procedure: Ablation atrial flutter;  Surgeon: Jose Antonio Gustafson MD;  Location: [facility] CATH INVASIVE LOCATION;  Service:     CAROTID ENDARTERECTOMY      CHOLECYSTECTOMY      CHOLECYSTECTOMY WITH INTRAOPERATIVE CHOLANGIOGRAM N/A 09/07/2019    Procedure: Laparoscopic cholecystectomy with intraoperative cholangiogram;  Surgeon: Gauri Tarvis MD;  Location: [facility] MAIN OR;  " Service: General    COLONOSCOPY  01/06/2015    Diverticulosis, one TA    COLONOSCOPY N/A 02/14/2019    tics, NBIH, adenomatous polyp x 2    COLONOSCOPY N/A 11/05/2021    Procedure: COLONOSCOPY TO CECUM AND TERM. ILEUM WITH COLD POLYPECTOMIES;  Surgeon: Everton Abel MD;  Location: [facility] ENDOSCOPY;  Service: Gastroenterology;  Laterality: N/A;  PRE OP - PERS H/O POLYPS  POST OP - COLON POLYPS,, DIVERTICULOSIS, HEMORRHOIDS    CORONARY ARTERY BYPASS GRAFT N/A 04/11/2017    Procedure: AR STERNOTOMY CORONARY ARTERY BYPASS GRAFT TIMES 3 USING LEFT INTERNAL MAMMARY ARTERY AND LEFT GREATER SAPHENOUS VEIN GRAFT PER ENDOSCOPIC VEIN HARVESTING AND PRP ;  Surgeon: Temo Cortez MD;  Location: [facility] MAIN OR;  Service:     ENDOSCOPY  01/06/2015    HH, Ervin's esophagus    ENDOSCOPY N/A 02/14/2019    Z line irregular, HH, Ervin's esophagus    ENDOSCOPY N/A 11/05/2021    Procedure: ESOPHAGOGASTRODUODENOSCOPY WITH BIOPSIES;  Surgeon: Everton Abel MD;  [facility];  Service: Gastroenterology;  Laterality: N/A;  PRE OP - PERS H/O ERVIN'S  POST OP - IRREG Z LINE    ENDOSCOPY W/ PEG TUBE PLACEMENT N/A 11/6/2023    Procedure: ESOPHAGOGASTRODUODENOSCOPY WITH PERCUTANEOUS ENDOSCOPIC GASTROSTOMY TUBE INSERTION;  Surgeon: Temo Ramsey MD;  Location: [facility] ENDOSCOPY;  Service: General;  Laterality: N/A;  Pre: dysphagia  Post: same    KNEE SURGERY Left     URETEROSCOPY LASER LITHOTRIPSY WITH STENT INSERTION Left 8/23/2022    Procedure: Cysto retrograde with left uretro stent placement;  Surgeon: Damien Oliveira MD;  Location: [facility] MAIN OR;  Service: Urology;  Laterality: Left;    VASECTOMY        General Information       Row Name 11/28/23 1316          OT Time and Intention    Document Type therapy note (daily note)  -SM     Mode of Treatment occupational therapy;individual therapy  -       Row Name 11/28/23 9245          General Information    Patient Profile Reviewed yes  -SM      Existing Precautions/Restrictions fall;NPO  -       Row Name 11/28/23 1316          Cognition    Orientation Status (Cognition) oriented x 3  -       Row Name 11/28/23 1316          Safety Issues, Functional Mobility    Impairments Affecting Function (Mobility) balance;endurance/activity tolerance;pain;range of motion (ROM);strength  -               User Key  (r) = Recorded By, (t) = Taken By, (c) = Cosigned By      Initials Name Provider Type     Trang Lee OT Occupational Therapist                     Mobility/ADL's       Row Name 11/28/23 1316          Bed Mobility    Supine-Sit [place] (Bed Mobility) maximum assist (25% patient effort);1 person assist;verbal cues  -       Row Name 11/28/23 1316          Bed-Chair Transfer    Bed-Chair [place] (Transfers) moderate assist (50% patient effort);1 person assist;verbal cues  -     Assistive Device (Bed-Chair Transfers) walker, front-wheeled  -       Row Name 11/28/23 1316          Sit-Stand Transfer    Sit-Stand [place] (Transfers) moderate assist (50% patient effort);1 person assist;verbal cues  -     Assistive Device (Sit-Stand Transfers) walker, front-wheeled  -       Row Name 11/28/23 1316          Functional Mobility    Functional Mobility- Comment pt very fatiqued following pivot transfer  -       Row Name 11/28/23 1316          Lower Body Dressing Assessment/Training    [place] Level (Lower Body Dressing) don;doff;socks;dependent (less than 25% patient effort)  -     Position (Lower Body Dressing) supine  -       Row Name 11/28/23 1316          Self-Feeding Assessment/Training    [place] Level (Feeding) scoop food and bring to mouth;contact guard assist  -     Comment, (Feeding) encouragement to complete self feeding of ice chips vs allow spouse to fed pt to increase UE coordination and active movement.  -               User Key  (r) = Recorded By, (t) = Taken By, (c) = Cosigned By      Initials Name  Provider Type     Trang Lee OT Occupational Therapist                   Obj/Interventions       Row Name 11/28/23 1318          Shoulder (Therapeutic Exercise)    Shoulder (Therapeutic Exercise) AROM (active range of motion)  -     Shoulder AROM (Therapeutic Exercise) bilateral;extension;flexion;aBduction;aDduction;horizontal aBduction/aDduction;10 repetitions;sitting  -[facility] Name 11/28/23 1318          Elbow/Forearm (Therapeutic Exercise)    Elbow/Forearm (Therapeutic Exercise) AROM (active range of motion)  -     Elbow/Forearm AROM (Therapeutic Exercise) bilateral;flexion;extension;10 repetitions  -       Row Name 11/28/23 1318          Motor Skills    Therapeutic Exercise shoulder;elbow/forearm  -[facility] Name 11/28/23 1318          Balance    Static Sitting Balance minimal assist;contact guard  -     Dynamic Sitting Balance minimal assist  -     Position, Sitting Balance sitting edge of bed  -     Static Standing Balance minimal assist;moderate assist  -     Dynamic Standing Balance moderate assist  -     Position/Device Used, Standing Balance supported;walker, rolling  -     Comment, Balance inital 5 minutes sitting EOB OT had to cue and provide assist for sitting balance. Pt is able to correct with extended time and cues. Initally he has a posterior and R sided lean.  -               User Key  (r) = Recorded By, (t) = Taken By, (c) = Cosigned By      Initials Name Provider Type    Trang Poole OT Occupational Therapist                   Goals/Plan    No documentation.                  Clinical Impression       Row Name 11/28/23 1319          Pain Assessment    Pain Intervention(s) Repositioned;Ambulation/increased activity  waffle cushion  -       Row Name 11/28/23 1319          Pain Scale: FACES Pre/Post-Treatment    Pain: FACES Scale, Pretreatment 4-->hurts little more  -     Posttreatment Pain Rating 4-->hurts little more  -     Pre/Posttreatment Pain  "Comment \"everywhere\" \"buttock\"  -       Row Name 11/28/23 1319          Plan of Care Review    Plan of Care Reviewed With patient;spouse  -     Progress improving  -     Outcome Evaluation Pt was able to participate in OT today, he requires heavy assist X1 to get to EOB and pivot to the chair today. Pt is generally weak and completed UE exercises in the chair. He requires total A for all LB ADLs at this time. His spouse is happy to see pt sitting up as he has not been able to tolerate any OOB activity this hospial stay. OT educated pt and spouse on AAROM HEP to complete during the day to improve strength/ROM.  -       Row Name 11/28/23 1319          Therapy Plan Review/Discharge Plan (OT)    Anticipated Discharge Disposition (OT) skilled nursing facility  -       Row Name 11/28/23 1319          Positioning and Restraints    Pre-Treatment Position in bed  -SM     Post Treatment Position chair  -SM     In Chair reclined;call light within reach;encouraged to call for assist;exit alarm on;notified [facility]  -               User Key  (r) = Recorded By, (t) = Taken By, (c) = Cosigned By      Initials Name Provider Type    SM Trang Lee, MEHDI Occupational Therapist                   Outcome Measures       Row Name 11/28/23 1321          How much help from another is currently needed...    Putting on and taking off regular lower body clothing? 1  -SM     Bathing (including washing, rinsing, and drying) 2  -SM     Toileting (which includes using toilet bed pan or urinal) 1  -SM     Putting on and taking off regular upper body clothing 2  -SM     Taking care of personal grooming (such as brushing teeth) 2  -SM     Eating meals 3  -SM     AM-PAC 6 Clicks Score (OT) 11  -       Row Name 11/28/23 1039          How much help from another person do you currently need...    Turning from your back to your side while in flat bed without using bedrails? 2  -KH (r) JY (t) KH (c)     Moving from lying on back to sitting on " the side of a flat bed without bedrails? 2  -CHARLES (r) ALINAY (t) CHARLES (c)     Moving to and from a bed to a chair (including a wheelchair)? 3  -CHARLES (r) JY (t) KH (c)     Standing up from a chair using your arms (e.g., wheelchair, bedside chair)? 3  -KH (r) JY (t) KH (c)     Climbing 3-5 steps with a railing? 1  -KH (r) JY (t) KH (c)     To walk in hospital room? 2  -KH (r) JY (t) KH (c)     AM-PAC 6 Clicks Score (PT) 13  -KH (r) JY (t)     Highest Level of Mobility Goal 4 --> Transfer to chair/commode  -KH (r) JY (t)       Row Name 11/28/23 1321 11/28/23 1039       Functional Assessment    Outcome Measure Options AM-PAC 6 Clicks Daily Activity (OT)  - AM-PAC 6 Clicks Basic Mobility (PT)  -CHARLES (r) JY (t) KH (c)              User Key  (r) = Recorded By, (t) = Taken By, (c) = Cosigned By      Initials Name Provider Type    Monica Douglas, PT Physical Therapist    Trang Poole, OT Occupational Therapist    Roque Pickard, PTA Student PTA Student                    Occupational Therapy Education       Title: PT OT SLP Therapies (Done)       Topic: Occupational Therapy (Done)       Point: ADL training (Done)       Description:   Instruct learner(s) on proper safety adaptation and remediation techniques during self care or transfers.   Instruct in proper use of assistive devices.                  Learning Progress Summary             Patient Acceptance, E, VU,NR by ARON at 11/24/2023 1247    Acceptance, E,TB, VU by ABILIO at 11/22/2023 1748                         Point: Home exercise program (Done)       Description:   Instruct learner(s) on appropriate technique for monitoring, assisting and/or progressing therapeutic exercises/activities.                  Learning Progress Summary             Patient Acceptance, E,TB, VU by ABILIO at 11/22/2023 1748                         Point: Precautions (Done)       Description:   Instruct learner(s) on prescribed precautions during self-care and functional transfers.                   Learning Progress Summary             Patient Acceptance, E, VU,NR by  at 11/24/2023 1247    Acceptance, E,TB, VU by  at 11/22/2023 1748                         Point: Body mechanics (Done)       Description:   Instruct learner(s) on proper positioning and spine alignment during self-care, functional mobility activities and/or exercises.                  Learning Progress Summary             Patient Acceptance, E, VU,NR by  at 11/24/2023 1247    Acceptance, E,TB, VU by  at 11/22/2023 1748                                         User Key       Initials Effective Dates Name Provider Type Discipline     10/17/23 -  Nancy Garcia RN Registered Nurse Nurse     06/10/21 -  Nevin Mcrae OT Occupational Therapist OT                  OT Recommendation and Plan     Plan of Care Review  Plan of Care Reviewed With: patient, spouse  Progress: improving  Outcome Evaluation: Pt was able to participate in OT today, he requires heavy assist X1 to get to EOB and pivot to the chair today. Pt is generally weak and completed UE exercises in the chair. He requires total A for all LB ADLs at this time. His spouse is happy to see pt sitting up as he has not been able to tolerate any OOB activity this hospial stay. OT educated pt and spouse on AAROM HEP to complete during the day to improve strength/ROM.     Time Calculation:         Time Calculation- OT       Row Name 11/28/23 1322             Time Calculation- OT    OT Start Time 0928  -SM      OT Stop Time 0952  -SM      OT Time Calculation (min) 24 min  -SM      Total Timed Code Minutes- OT 24 minute(s)  -SM      OT Received On 11/28/23  -      OT - Next Appointment 11/29/23  -SM         Timed Charges    32394 - OT Therapeutic Exercise Minutes 10  -SM      15441 - OT Therapeutic Activity Minutes 14  -SM         Total Minutes    Timed Charges Total Minutes 24  -SM       Total Minutes 24  -SM                User Key  (r) = Recorded By, (t) = Taken By, (c) = Cosigned  By      Initials Name Provider Type     Trang Lee OT Occupational Therapist                  Therapy Charges for Today       Code Description Service Date Service Provider Modifiers Qty    [id number]  OT THER PROC EA 15 MIN 11/28/2023 Trang Lee OT GO 1    [id number]  OT THERAPEUTIC ACT EA 15 MIN 11/28/2023 Trang Lee OT GO 1                 Trang Lee OT  11/28/2023

## 2023-11-28 NOTE — THERAPY TREATMENT NOTE
Patient Name: Madhu Santoro  : 1945    MRN: [id number]                              Today's Date: 2023       Admit Date: 2023    Visit Dx:     ICD-10-CM ICD-9-CM   1. Weakness  R53.1 780.79   2. Elevated troponin  R79.89 790.6   3. Hypoxia  R09.02 799.02     Patient Active Problem List   Diagnosis    Anxiety    Arthritis    Coronary artery disease due to lipid rich plaque    HLD (hyperlipidemia)    Benign essential hypertension    DDD (degenerative disc disease), lumbosacral    Cerebrovascular accident    Encounter for screening for cardiovascular disorders    Gastroesophageal reflux disease    Hyperlipidemia    Stenosis of carotid artery    Former smoker    History of cardiac catheterization    S/P ablation of atrial flutter    Typical atrial flutter    S/P CABG (coronary artery bypass graft)    Adenomatous polyp of ascending colon    Abdominal bloating    Stroke    PAD (peripheral artery disease)    Acute cholecystitis    Right upper quadrant abdominal pain    Chronic pain of both hips    Chronic bilateral low back pain without sciatica    Sacroiliac joint dysfunction of both sides    Encounter for long-term (current) use of high-risk medication    Lumbar facet arthropathy    Stroke-like symptoms    CVA (cerebral vascular accident)    Personal history of colonic polyps    Arthralgia of multiple joints    Iron deficiency    Thrombocytosis    Left ureteral stone    Obstructive uropathy    Chronic anticoagulation    Facial skin lesion    Iron deficiency anemia    Polycythemia    Neuropathy    Weakness    Pneumonia    Close exposure to COVID-19 virus    Polycythemia vera    Chronic gout without tophus    COVID-19    Cytokine release syndrome, grade 2    Anemia    History of CVA (cerebrovascular accident)    S/P percutaneous endoscopic gastrostomy (PEG) tube placement    Mandel esophagus    Elevated troponin    Leukocytosis    Sacral decubitus ulcer    Wound infection    Oral phase dysphagia     "Acute blood loss anemia     Past Medical History:   Diagnosis Date    Anxiety     Arthritis     Atrial flutter     Status post cavotricuspid isthmus ablation by Dr. Gustafson on 4/18/17    Mandel esophagus     Benign essential hypertension     CAD (coronary artery disease)     3 vessel CABG 4/11/17 by Dr. Cortez: ROSARIO-prox LAD, SVG-OM1, SVG-OM3    Carotid artery disease     Status post carotid endarterectomy - USG 4/10/17: 50-59% NICHELLE, 1-15% LICA.     Colonic polyp     Cyst of pancreas     DDD (degenerative disc disease), lumbosacral     GERD (gastroesophageal reflux disease)     H/O bone density study 2013    H/O complete eye exam 2014    History of kidney stone     HLD (hyperlipidemia)     Hypertension     Kidney stone     8/22/22    Lipid screening 05/31/2013    Low back pain     [facility]    Screening for prostate cancer 07/07/2015    Skin cancer     nose    Stroke     RESIDUAL--\"BALANCE ISSUES\"     Past Surgical History:   Procedure Laterality Date    CARDIAC CATHETERIZATION N/A 04/10/2017    Procedure: Left Heart Cath;  Surgeon: Marjorie Healy MD;  Location: [facility] CATH INVASIVE LOCATION;  Service:     CARDIAC CATHETERIZATION N/A 04/10/2017    Procedure: Coronary angiography;  Surgeon: Marjorie Healy MD;  [facility];  Service:     CARDIAC CATHETERIZATION N/A 04/10/2017    Procedure: Left ventriculography;  Surgeon: Marjorie Healy MD;  Location: [facility] CATH INVASIVE LOCATION;  Service:     CARDIAC CATHETERIZATION  2011    CARDIAC ELECTROPHYSIOLOGY PROCEDURE N/A 04/18/2017    Procedure: Ablation atrial flutter;  Surgeon: Jose Antonio Gustafson MD;  Location: [facility] CATH INVASIVE LOCATION;  Service:     CAROTID ENDARTERECTOMY      CHOLECYSTECTOMY      CHOLECYSTECTOMY WITH INTRAOPERATIVE CHOLANGIOGRAM N/A 09/07/2019    Procedure: Laparoscopic cholecystectomy with intraoperative cholangiogram;  Surgeon: Gauri Travis MD;  Location: [facility] MAIN OR;  " Service: General    COLONOSCOPY  01/06/2015    Diverticulosis, one TA    COLONOSCOPY N/A 02/14/2019    tics, NBIH, adenomatous polyp x 2    COLONOSCOPY N/A 11/05/2021    Procedure: COLONOSCOPY TO CECUM AND TERM. ILEUM WITH COLD POLYPECTOMIES;  Surgeon: Everton Abel MD;  [facility];  Service: Gastroenterology;  Laterality: N/A;  PRE OP - PERS H/O POLYPS  POST OP - COLON POLYPS,, DIVERTICULOSIS, HEMORRHOIDS    CORONARY ARTERY BYPASS GRAFT N/A 04/11/2017    Procedure: AR STERNOTOMY CORONARY ARTERY BYPASS GRAFT TIMES 3 USING LEFT INTERNAL MAMMARY ARTERY AND LEFT GREATER SAPHENOUS VEIN GRAFT PER ENDOSCOPIC VEIN HARVESTING AND PRP ;  Surgeon: Temo Cortez MD;  Location: [facility] MAIN OR;  Service:     ENDOSCOPY  01/06/2015    HH, Ervin's esophagus    ENDOSCOPY N/A 02/14/2019    Z line irregular, HH, Ervin's esophagus    ENDOSCOPY N/A 11/05/2021    Procedure: ESOPHAGOGASTRODUODENOSCOPY WITH BIOPSIES;  Surgeon: Everton Abel MD;  [facility];  Service: Gastroenterology;  Laterality: N/A;  PRE OP - PERS H/O ERVIN'S  POST OP - IRREG Z LINE    ENDOSCOPY W/ PEG TUBE PLACEMENT N/A 11/6/2023    Procedure: ESOPHAGOGASTRODUODENOSCOPY WITH PERCUTANEOUS ENDOSCOPIC GASTROSTOMY TUBE INSERTION;  Surgeon: Temo Ramsey MD;  [facility];  Service: General;  Laterality: N/A;  Pre: dysphagia  Post: same    KNEE SURGERY Left     URETEROSCOPY LASER LITHOTRIPSY WITH STENT INSERTION Left 8/23/2022    Procedure: Cysto retrograde with left uretro stent placement;  Surgeon: Damien Oliveira MD;  Location: [facility] MAIN OR;  Service: Urology;  Laterality: Left;    VASECTOMY        General Information       Row Name 11/28/23 1030          Physical Therapy Time and Intention    Document Type therapy note (daily note)  -CHARLES (melissa) CLARKE (agustín) CHARLES (merrill)     Mode of Treatment individual therapy;physical therapy  -CHARLES (melissa) CLARKE (agustín) CHARLES (c)       Row Name 11/28/23 1030          General  Information    Patient Profile Reviewed yes  -KH (r) JY (t) KH (c)       Row Name 11/28/23 1030          Safety Issues, Functional Mobility    Comment, Safety Issues/Impairments (Mobility) Gait belt and non skid socks donned for safety  -KH (r) JY (t) KH (c)               User Key  (r) = Recorded By, (t) = Taken By, (c) = Cosigned By      Initials Name Provider Type    Monica Douglas, PT Physical Therapist    Roque Pickard, PTA Student PTA Student                   Mobility       Row Name 11/28/23 1031          Bed Mobility    Bed Mobility sit-supine  -KH (r) JY (t) KH (c)     Sit-Supine Hyde (Bed Mobility) moderate assist (50% patient effort);2 person assist  -KH (r) JY (t) KH (c)     Assistive Device (Bed Mobility) head of bed elevated  -KH (r) JY (t) KH (c)       Row Name 11/28/23 1031          Bed-Chair Transfer    Bed-Chair Hyde (Transfers) minimum assist (75% patient effort);2 person assist  -KH (r) JY (t) KH (c)     Assistive Device (Bed-Chair Transfers) walker, front-wheeled  -KH (r) JY (t) KH (c)     Comment, (Bed-Chair Transfer) Pt's legs were shaky and unsteady for the short distance to stand and pivot from chair to bed, req Min A x2  -KH (r) JY (t) KH (c)       Row Name 11/28/23 1031          Sit-Stand Transfer    Sit-Stand Hyde (Transfers) minimum assist (75% patient effort);2 person assist  -KH (r) JY (t) KH (c)     Assistive Device (Sit-Stand Transfers) walker, front-wheeled  -KH (r) JY (t) KH (c)       Row Name 11/28/23 1031          Gait/Stairs (Locomotion)    Distance in Feet (Gait) Pt too fatigued to walk any further than stand and pivot transfer.  -KH (r) JY (t) KH (c)               User Key  (r) = Recorded By, (t) = Taken By, (c) = Cosigned By      Initials Name Provider Type    Monica Douglas, PT Physical Therapist    Roque Pickard, PTA Student PTA Student                   Obj/Interventions       Row Name 11/28/23 1034          Motor Skills     Therapeutic Exercise --  Supine AP, Hip Ab/Adduction (AAROM), SAQ x 10 reps each  -CHARLES (r) CLARKE (t) CHARLES (c)       Row Name 11/28/23 1034          Balance    Balance Assessment sitting static balance;standing static balance  -CHARLES (r) CLARKE (t) CHARLES (c)     Static Sitting Balance contact guard  -CHARLES (r) CLARKE (t) CHARLES (c)     Position, Sitting Balance sitting in chair;sitting edge of bed  -CHARLES (r) CLARKE (t) CHARLES (c)     Static Standing Balance minimal assist;2-person assist  -CHARLES (r) CLARKE (t) CHARLES (c)     Position/Device Used, Standing Balance walker, front-wheeled  -CHARLES (r) CLARKE (t) CHARLES (c)               User Key  (r) = Recorded By, (t) = Taken By, (c) = Cosigned By      Initials Name Provider Type    Monica Douglas, PT Physical Therapist    Roque Pickard, PTA Student PTA Student                   Goals/Plan    No documentation.                  Clinical Impression       Row Name 11/28/23 1035          Pain    Additional Documentation Pain Scale: FACES Pre/Post-Treatment (Group)  -CHARLES (r) CLARKE (t) CHARLES (c)       Row Name 11/28/23 1033          Pain Scale: FACES Pre/Post-Treatment    Pain: FACES Scale, Pretreatment 2-->hurts little bit  -CHARLES (r) CLARKE (t) CHARLES (c)     Posttreatment Pain Rating 2-->hurts little bit  -CHARLES (r) CLARKE (t) CHARLES (c)     Pre/Posttreatment Pain Comment Pt c/o generalized pain and excessive fatigue from working with OT earlier. Pt requested pain medication during treatment.  -CHARLES (r) CLARKE (t) CHARLES (c)       Row Name 11/28/23 1034          Plan of Care Review    Plan of Care Reviewed With patient;spouse  -CHARLES (r) CLARKE (t) CHARLES (c)     Outcome Evaluation Pt UIC upon arrival after working w OT. Pt had been sitting UIC for 18 min according to wife and was ready to get back in bed. Pt c/o generalized pain and excessive fatigue, requesting pain medication from nursing during therapy session. Pt stood and pivoted w Rwx and min A x 2 to bed. Pt's BLE were very unsteady and weak upon standing and taking steps. Pt was agreeable to supine ther ex,  req some assist for hip abduction on the R LE. Has trouble moving R LE. Pt's eyes were closed and was ready to rest at end of therapy today.  -CHARLES (r) CLARKE (t) CHARLES (c)       Row Name 11/28/23 1035          Positioning and Restraints    Pre-Treatment Position sitting in chair/recliner  -CHARLES (r) JY (t) KH (c)     Post Treatment Position bed  -KH (r) JY (t) CHARLES (c)     In Bed notified nsg;exit alarm on;encouraged to call for assist;call light within reach;with family/caregiver;fowlers  -KH (r) JY (t) CHARLES (c)               User Key  (r) = Recorded By, (t) = Taken By, (c) = Cosigned By      Initials Name Provider Type    Monica Douglas, PT Physical Therapist    Roque Pickard, PTA Student PTA Student                   Outcome Measures       Row Name 11/28/23 1039          How much help from another person do you currently need...    Turning from your back to your side while in flat bed without using bedrails? 2  -KH (r) JY (t) KH (c)     Moving from lying on back to sitting on the side of a flat bed without bedrails? 2  -KH (r) JY (t) KH (c)     Moving to and from a bed to a chair (including a wheelchair)? 3  -KH (r) JY (t) KH (c)     Standing up from a chair using your arms (e.g., wheelchair, bedside chair)? 3  -KH (r) JY (t) KH (c)     Climbing 3-5 steps with a railing? 1  -KH (r) JY (t) KH (c)     To walk in hospital room? 2  -KH (r) JY (t) KH (c)     AM-PAC 6 Clicks Score (PT) 13  -KH (r) JY (t)     Highest Level of Mobility Goal 4 --> Transfer to chair/commode  -KH (r) JY (t)       Row Name 11/28/23 1039          Functional Assessment    Outcome Measure Options AM-PAC 6 Clicks Basic Mobility (PT)  -CHARLES (r) JY (t) KH (c)               User Key  (r) = Recorded By, (t) = Taken By, (c) = Cosigned By      Initials Name Provider Type    Monica Douglas, PT Physical Therapist    Roque Pickard, PTA Student PTA Student                                 Physical Therapy Education       Title: PT OT SLP Therapies  (Done)       Topic: Physical Therapy (Done)       Point: Mobility training (Done)       Learning Progress Summary             Patient Acceptance, E, DU by CLARKE at 11/28/2023 1040    Acceptance, E,D, VU,NR by EE at 11/24/2023 0927    Acceptance, E,TB, VU by KW at 11/22/2023 1748   Family Acceptance, E, DU by JEFE at 11/28/2023 1040                         Point: Home exercise program (Done)       Learning Progress Summary             Patient Acceptance, E, DU by CLARKE at 11/28/2023 1040    Acceptance, E,D, VU,NR by EE at 11/24/2023 0927    Acceptance, E,TB, VU by KW at 11/22/2023 1748   Family Acceptance, E, DU by JEFE at 11/28/2023 1040                         Point: Body mechanics (Done)       Learning Progress Summary             Patient Acceptance, E, DU by CLARKE at 11/28/2023 1040    Acceptance, E,D, VU,NR by EE at 11/24/2023 0927    Acceptance, E,TB, VU by KW at 11/22/2023 1748   Family Acceptance, E, DU by JEFE at 11/28/2023 1040                         Point: Precautions (Done)       Learning Progress Summary             Patient Acceptance, E, DU by CLARKE at 11/28/2023 1040    Acceptance, E,TB, VU by  at 11/22/2023 1748   Family Acceptance, E, DU by CLARKE at 11/28/2023 1040                                         User Key       Initials Effective Dates Name Provider Type Discipline     06/16/21 -  Tawana Weeks, PT Physical Therapist PT     10/17/23 -  Nancy Garcia, RN Registered Nurse Nurse    J 11/08/23 -  Roque Sewell, PTA Student PTA Student PT                  PT Recommendation and Plan     Plan of Care Reviewed With: patient, spouse  Outcome Evaluation: Pt UIC upon arrival after working w OT. Pt had been sitting UIC for 18 min according to wife and was ready to get back in bed. Pt c/o generalized pain and excessive fatigue, requesting pain medication from nursing during therapy session. Pt stood and pivoted w Rwx and min A x 2 to bed. Pt's BLE were very unsteady and weak upon standing and taking steps. Pt was  agreeable to supine ther ex, req some assist for hip abduction on the R LE. Has trouble moving R LE. Pt's eyes were closed and was ready to rest at end of therapy today.     Time Calculation:         PT Charges       Row Name 11/28/23 1040             Time Calculation    Start Time 1002  -KH (r) JY (t) KH (c)      Stop Time 1025  -KH (r) JY (t) KH (c)      Time Calculation (min) 23 min  -KH (r) JY (t)      PT Received On 11/28/23  -KH (r) JY (t) KH (c)      PT - Next Appointment 11/29/23  -KH (r) JY (t) KH (c)         Time Calculation- PT    Total Timed Code Minutes- PT 23 minute(s)  -KH (r) JY (t) KH (c)         Timed Charges    85826 - PT Therapeutic Exercise Minutes 5  -KH (r) JY (t) KH (c)      99932 - PT Therapeutic Activity Minutes 18  -KH (r) JY (t) KH (c)         Total Minutes    Timed Charges Total Minutes 23  -KH (r) JY (t)       Total Minutes 23  -KH (r) JY (t)                User Key  (r) = Recorded By, (t) = Taken By, (c) = Cosigned By      Initials Name Provider Type    Monica Douglas, PT Physical Therapist    Roque Pickard, FREDY Student PTA Student                  Therapy Charges for Today       Code Description Service Date Service Provider Modifiers Qty    97516557294 HC PT THER PROC EA 15 MIN 11/28/2023 Roque Sewell PTA Student GP 1    99797434134 HC PT THERAPEUTIC ACT EA 15 MIN 11/28/2023 Roque Sewell PTA Student GP 1            PT G-Codes  Outcome Measure Options: AM-PAC 6 Clicks Basic Mobility (PT)  AM-PAC 6 Clicks Score (PT): 13  AM-PAC 6 Clicks Score (OT): 9  PT Discharge Summary  Anticipated Discharge Disposition (PT): skilled nursing facility    Roque Sewell PTA Student  11/28/2023

## 2023-11-28 NOTE — CASE MANAGEMENT/SOCIAL WORK
"Physicians Statement of Medical Necessity for  Ambulance Transportation    GENERAL INFORMATION     Name: Madhu Santoro  YOB: 1945  Medicare #: [id number]  Transport Date:    (Valid for round trips this date, or for scheduled repetitive trips for 60 days from the date signed below.)  Origin: [facility]  Destination: [facility]  Is the Patient's stay covered under Medicare Part A (PPS/DRG?)Yes  Closest appropriate facility? Yes  If this a hosp-hosp transfer? No  Is this a hospice patient? No    MEDICAL NECESSITY QUESTIONAIRE    Ambulance Transportation is medically necessary only if other means of transportation are contraindicated or would be potentially harmful to the patient.  To meet this requirement, the patient must be either \"bed confined\" or suffer from a condition such that transport by means other than an ambulance is contraindicated by the patient's condition.  The following questions must be answered by the healthcare professional signing below for this form to be valid:     1) Describe the MEDICAL CONDITION (physical and/or mental) of this patient AT THE TIME OF AMBULANCE TRANSPORT that requires the patient to be transported in an ambulance, and why transport by other means is contraindicated by the patient's condition: unable to transfer, on oxygen, some confusion  Past Medical History:   Diagnosis Date    Anxiety     Arthritis     Atrial flutter     Status post cavotricuspid isthmus ablation by Dr. Gustafson on 4/18/17    Mandel esophagus     Benign essential hypertension     CAD (coronary artery disease)     3 vessel CABG 4/11/17 by Dr. Cortez: ROSARIO-prox LAD, SVG-OM1, SVG-OM3    Carotid artery disease     Status post carotid endarterectomy - USG 4/10/17: 50-59% NICHELLE, 1-15% LICA.     Colonic polyp     Cyst of pancreas     DDD (degenerative disc disease), lumbosacral     GERD (gastroesophageal reflux disease)     H/O bone density study 2013    H/O complete eye exam 2014    History of " "kidney stone     HLD (hyperlipidemia)     Hypertension     Kidney stone     8/22/22    Lipid screening 05/31/2013    Low back pain     [facility]    Screening for prostate cancer 07/07/2015    Skin cancer     nose    Stroke     RESIDUAL--\"BALANCE ISSUES\"      Past Surgical History:   Procedure Laterality Date    CARDIAC CATHETERIZATION N/A 04/10/2017    Procedure: Left Heart Cath;  Surgeon: Marjorie Healy MD;  Location: [facility] CATH INVASIVE LOCATION;  Service:     CARDIAC CATHETERIZATION N/A 04/10/2017    Procedure: Coronary angiography;  Surgeon: Marjorie Healy MD;  [facility];  Service:     CARDIAC CATHETERIZATION N/A 04/10/2017    Procedure: Left ventriculography;  Surgeon: Marjorie Healy MD;  Location: [facility] CATH INVASIVE LOCATION;  Service:     CARDIAC CATHETERIZATION  2011    CARDIAC ELECTROPHYSIOLOGY PROCEDURE N/A 04/18/2017    Procedure: Ablation atrial flutter;  Surgeon: Jose Antonio Gustafson MD;  Location: [facility] CATH INVASIVE LOCATION;  Service:     CAROTID ENDARTERECTOMY      CHOLECYSTECTOMY      CHOLECYSTECTOMY WITH INTRAOPERATIVE CHOLANGIOGRAM N/A 09/07/2019    Procedure: Laparoscopic cholecystectomy with intraoperative cholangiogram;  Surgeon: Gauri Travis MD;  Location: [facility] MAIN OR;  Service: General    COLONOSCOPY  01/06/2015    Diverticulosis, one TA    COLONOSCOPY N/A 02/14/2019    tics, NBIH, adenomatous polyp x 2    COLONOSCOPY N/A 11/05/2021    Procedure: COLONOSCOPY TO CECUM AND TERM. ILEUM WITH COLD POLYPECTOMIES;  Surgeon: Everton Abel MD;  Location: [facility] ENDOSCOPY;  Service: Gastroenterology;  Laterality: N/A;  PRE OP - PERS H/O POLYPS  POST OP - COLON POLYPS,, DIVERTICULOSIS, HEMORRHOIDS    CORONARY ARTERY BYPASS GRAFT N/A 04/11/2017    Procedure: AR STERNOTOMY CORONARY ARTERY BYPASS GRAFT TIMES 3 USING LEFT INTERNAL MAMMARY ARTERY AND LEFT GREATER SAPHENOUS VEIN GRAFT PER ENDOSCOPIC VEIN HARVESTING AND PRP ;  Surgeon: " "Temo Cortez MD;  Location: [facility] MAIN OR;  Service:     ENDOSCOPY  01/06/2015    HH, Ervin's esophagus    ENDOSCOPY N/A 02/14/2019    Z line irregular, HH, Ervin's esophagus    ENDOSCOPY N/A 11/05/2021    Procedure: ESOPHAGOGASTRODUODENOSCOPY WITH BIOPSIES;  Surgeon: Everton Abel MD;  [facility];  Service: Gastroenterology;  Laterality: N/A;  PRE OP - PERS H/O ERVIN'S  POST OP - IRREG Z LINE    ENDOSCOPY W/ PEG TUBE PLACEMENT N/A 11/6/2023    Procedure: ESOPHAGOGASTRODUODENOSCOPY WITH PERCUTANEOUS ENDOSCOPIC GASTROSTOMY TUBE INSERTION;  Surgeon: Temo Ramsey MD;  Location: [facility] ENDOSCOPY;  Service: General;  Laterality: N/A;  Pre: dysphagia  Post: same    KNEE SURGERY Left     URETEROSCOPY LASER LITHOTRIPSY WITH STENT INSERTION Left 8/23/2022    Procedure: Cysto retrograde with left uretro stent placement;  Surgeon: Damien Oliveira MD;  Location: [facility] MAIN OR;  Service: Urology;  Laterality: Left;    VASECTOMY        2) Is this patient \"bed confined\" as defined below?Yes   To be \"bed confined\" the patient must satisfy all three of the following criteria:  (1) unable to get up from bed without assistance; AND (2) unable to ambulate;  AND (3) unable to sit in a chair or wheelchair.  3) Can this patient safely be transported by car or wheelchair van (I.e., may safely sit during transport, without an attendant or monitoring?)No   4. In addition to completing questions 1-3 above, please check any of the following conditions that apply*:          *Note: supporting documentation for any boxes checked must be maintained in the patient's medical records Patient is comatose, Moderate/severe pain on movement, and Requires oxygen - unable to self administer      SIGNATURE OF PHYSICIAN OR OTHER AUTHORIZED HEALTHCARE PROFESSIONAL    I certify that the above information is true and correct based on my evaluation of this patient, and represent that the patient requires " transport by ambulance and that other forms of transport are contraindicated.  I understand that this information will be used by the Centers for Medicare and Medicaid Services (CMS) to support the determiniation of medical necessity for ambulance services, and I represent that I have personal knowledge of the patient's condition at the time of transport.    x   If this box is checked, I also certify that the patient is physically or mentally incapable of signing the ambulance service's claim form and that the institution with which I am affiliated has furnished care, services or assistance to the patient.  My signature below is made on behalf of the patient pursuant to 42 .36(b)(4). In accordance with 42 .37, the specific reason(s) that the patient is physically or mentally incapable of signing the claim for is as follows:     Signature of Physician or Healthcare Professional  Date/Time:        (For Scheduled repetitive transport, this form is not valid for transports performed more than 60 days after this date).                 Trang Carter RN, BSN, [facility]                                                                                                                              --------------------------------------------------------------------------------------------  Printed Name and Credentials of Physician or Authorized Healthcare Professional     *Form must be signed by patient's attending physician for scheduled, repetitive transports,.  For non-repetitive ambulance transports, if unable to obtain the signature of the attending physician, any of the following may sign (please select below):     Physician  Clinical Nurse Specialist  Registered Nurse     Physician Assistant x Discharge Planner  Licensed Practical Nurse     Nurse Practitioner x

## 2023-11-28 NOTE — PROGRESS NOTES
[facility] Services  PROGRESS NOTE    Patient Name: Madhu Santoro  : 1945  MRN: [id number]    Date of Admission: 2023  Primary Care Physician: Damian Sanchez MD    Subjective   Subjective     CC:  Follow-up weakness    Subjective:  Patient doing little better with therapy today.  He was sitting up in the chair and working with physical therapy today.  His family was at the bedside and pleased with his improvement.  They are agreement with plan to likely transition to SNF soon.  No new complaints or issues.  No further bleeding.  He is tolerating tube feedings.  I discussed the findings with CT scan with the family    Review of Systems  No current fevers or chills  No current shortness of breath or cough  No current nausea, vomiting, or diarrhea  No current chest pain or palpitations       Objective   Objective     Vital Signs:   Temp:  [97.3 °F (36.3 °C)-99.3 °F (37.4 °C)] 99.3 °F (37.4 °C)  Heart Rate:  [93-95] 95  Resp:  [18] 18  BP: (121-135)/(54-85) 135/85        Physical Exam:  Constitutional:Awake, alert  HENT: NCAT, mucous membranes moist, neck supple  Respiratory: No cough or wheezing, nonlabored breathing, moderate inspiration  Cardiovascular: Pulse rate is normal, normal radial pulses  Gastrointestinal: PEG tube, soft, nontender, nondistended  Musculoskeletal: Elderly and somewhat chronically debilitated in appearance, minimal lower extremity edema, BMI is 24  Psychiatric: Appropriate affect, cooperative, conversational  Neurologic: No slurred speech or facial droop, follows commands  Skin: Pale, decubitus ulcer present in the gluteal region, no rashes or jaundice, warm      Results Reviewed:  Results from last 7 days   Lab Units 23  0656 23  0646 23  0702 23  1712 23  0615 23  1317   WBC 10*3/mm3 9.50 9.77 8.88  --    < >  --    HEMOGLOBIN g/dL 9.7* 9.2* 9.3*  --    < >  --    HEMATOCRIT % 30.6* 29.1* 28.9*  --    < >  --    PLATELETS  10*3/mm3 335 311 366  --    < >  --    INR   --   --   --  1.13*  --   --    PROCALCITONIN ng/mL  --   --   --   --   --  0.67*    < > = values in this interval not displayed.     Results from last 7 days   Lab Units 11/28/23  0656 11/27/23  0646 11/26/23  0702 11/25/23  0654 11/24/23  0612 11/23/23  2039 11/23/23  0815 11/23/23  0615 11/22/23  1317 11/22/23  0955   SODIUM mmol/L 136 136 135*   < > 140  --  136   < >  --  135*   POTASSIUM mmol/L 4.2 4.0 4.1   < > 4.2   < > 3.1*   < >  --  3.3*   CHLORIDE mmol/L 102 103 101   < > 106  --  101   < >  --  95*   CO2 mmol/L 25.0 23.5 26.0   < > 25.5  --  29.8*   < >  --  32.5*   BUN mg/dL 20 19 17   < > 18  --  14   < >  --  18   CREATININE mg/dL 0.52* 0.60* 0.53*   < > 0.54*  --  0.58*   < >  --  0.69*   GLUCOSE mg/dL 114* 126* 92   < > 101*  --  87   < >  --  122*   CALCIUM mg/dL 8.7 8.4* 8.4*   < > 8.2*  --  8.1*   < >  --  8.3*   ALK PHOS U/L  --   --   --   --  235*  --  207*  --   --  269*   ALT (SGPT) U/L  --   --   --   --  52*  --  50*  --   --  66*   AST (SGOT) U/L  --   --   --   --  62*  --  51*  --   --  69*   HSTROP T ng/L  --   --   --   --   --   --   --   --  49* 46*    < > = values in this interval not displayed.     Estimated Creatinine Clearance: 122.4 mL/min (A) (by C-G formula based on SCr of 0.52 mg/dL (L)).    Microbiology Results Abnormal       Procedure Component Value - Date/Time    Blood Culture - Blood, Arm, Left [[id number]]  (Normal) Collected: 11/22/23 1819    Lab Status: Final result Specimen: Blood from Arm, Left Updated: 11/27/23 1900     Blood Culture No growth at 5 days    Blood Culture - Blood, Arm, Right [[id number]]  (Normal) Collected: 11/22/23 1819    Lab Status: Final result Specimen: Blood from Arm, Right Updated: 11/27/23 1900     Blood Culture No growth at 5 days    Respiratory Panel PCR w/COVID-19(SARS-CoV-2) DONTRELL/LESTER/KANCHAN/PAD/COR/BRIAN In-House, NP Swab in UTM/VTM, 2 HR TAT - Swab, Nasopharynx [[id number]]  (Normal) Collected:  11/22/23 1437    Lab Status: Final result Specimen: Swab from Nasopharynx Updated: 11/22/23 1602     ADENOVIRUS, PCR Not Detected     Coronavirus 229E Not Detected     Coronavirus HKU1 Not Detected     Coronavirus NL63 Not Detected     Coronavirus OC43 Not Detected     COVID19 Not Detected     Human Metapneumovirus Not Detected     Human Rhinovirus/Enterovirus Not Detected     Influenza A PCR Not Detected     Influenza B PCR Not Detected     Parainfluenza Virus 1 Not Detected     Parainfluenza Virus 2 Not Detected     Parainfluenza Virus 3 Not Detected     Parainfluenza Virus 4 Not Detected     RSV, PCR Not Detected     Bordetella pertussis pcr Not Detected     Bordetella parapertussis PCR Not Detected     Chlamydophila pneumoniae PCR Not Detected     Mycoplasma pneumo by PCR Not Detected    Narrative:      In the setting of a positive respiratory panel with a viral infection PLUS a negative procalcitonin without other underlying concern for bacterial infection, consider observing off antibiotics or discontinuation of antibiotics and continue supportive care. If the respiratory panel is positive for atypical bacterial infection (Bordetella pertussis, Chlamydophila pneumoniae, or Mycoplasma pneumoniae), consider antibiotic de-escalation to target atypical bacterial infection.            Imaging Results (Last 24 Hours)       ** No results found for the last 24 hours. **            Results for orders placed during the hospital encounter of 10/23/23    Adult Transthoracic Echo Complete W/ Cont if Necessary Per Protocol    Interpretation Summary    Left ventricular systolic function is normal. Calculated left ventricular EF = 62.8%    Left ventricular wall thickness is consistent with concentric remodelling.    Left ventricular diastolic function was normal.    Normal right ventricular size and function present.      I have reviewed the medications:  Scheduled Meds:amitriptyline, 50 mg, Per G Tube, Nightly  amLODIPine, 5  mg, Per G Tube, Daily  apixaban, 2.5 mg, Oral, Q12H  lansoprazole, 30 mg, Per G Tube, BID AC  Menthol-Zinc Oxide, 1 application , Topical, Q12H  sucralfate, 1 g, Per G Tube, BID AC      Continuous Infusions:   PRN Meds:.  acetaminophen **OR** acetaminophen **OR** acetaminophen    senna-docusate sodium **AND** polyethylene glycol **AND** bisacodyl **AND** bisacodyl    Calcium Replacement - Follow Nurse / BPA Driven Protocol    HYDROcodone-acetaminophen    ipratropium-albuterol    Magnesium Standard Dose Replacement - Follow Nurse / BPA Driven Protocol    nitroglycerin    ondansetron **OR** ondansetron    Phosphorus Replacement - Follow Nurse / BPA Driven Protocol    Potassium Replacement - Follow Nurse / BPA Driven Protocol    Assessment & Plan   Assessment & Plan     Active Hospital Problems    Diagnosis  POA    **Weakness [R53.1]  Yes    Wound infection [T14.8XXA, L08.9]  Yes    Oral phase dysphagia [R13.11]  Yes    Acute blood loss anemia [D62]  Yes    Anemia [D64.9]  Yes    History of CVA (cerebrovascular accident) [Z86.73]  Not Applicable    S/P percutaneous endoscopic gastrostomy (PEG) tube placement [Z93.1]  Not Applicable    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        Brief Hospital Course to date:  Madhu Santoro is a 77 y.o. male presents to the hospital with weakness and worsening anemia.    Discussion/plan:  Patient appears to be tolerating low-dose Eliquis.  We may not restart aspirin at first for now due to recent bleeding but I will discuss further with oncology team and we will discuss risks versus benefits with patient and family.  Case  discussed with infectious disease based on recent CT scan images.  CT scan of the abdomen does show bibasilar infiltrates.  With aphasia we feel this is more likely some chronic aspiration pneumonitis.  Patient is afebrile without leukocytosis and per my conversation with infectious disease team at this point we think the risks of antibiotics outweigh the benefits and that antibiotics are not needed at this time.  If he clinically changes this plan could change.  I discussed this with family who were in agreement with plan.  Case discussed on multidisciplinary rounds with case management, nursing, and pharmacist.  Case management is going to work on possible transfer to SNF tomorrow. Case discussed with infectious disease.  It is not felt that patient has active cellulitis or deep tissue injury and we will continue to treat with wound care for decubitus ulcer.  Maximizing nutrition is much as possible also be needed as well as strengthening.      Family would like to avoid aggressive endoscopic evaluation at this time.  Suspect etiology of bleeding is related to recent PEG healing.  PPI has been increased to twice daily.      Anemia, blood loss, and probable GI bleed:  History of polycythemia vera, currently off hydroxyurea  Hemoccult positive  PPI to twice daily.  Carafate  Gastroenterology following  Possible source could be recent feeding tube with NOAC or other etiology.  NOAC initially held    Recent COVID: Resolved, no current respiratory issues    Oral dysphagia:  Currently receiving tube feeds via PEG tube.  Maximize nutrition.    Decubitus ulcer:  Wound images reviewed.  Continue wound care frequent turns, maximize nutrition, and barrier cream.  Superficial wound culture was positive but polymicrobial and likely has substantial contaminant and no obvious sign of deep infection.  Infectious disease evaluated and recommended to hold off antibiotics for now based on risk versus benefit  assessment.    Polycythemia: Currently off hydroxyurea.  Oncology   Oncology workup    History of paroxysmal atrial flutter and stroke:  History of ablation.  Chronically anticoagulation currently held due to anemia.    Treatment plan discussed with family who are in agreement.    DVT Prophylaxis: Mechanical      Disposition: Anticipate to SNF most likely on 11/29 if stable on blood thinners possibly tomorrow    CODE STATUS:   Code Status and Medical Interventions:   Ordered at: 11/22/23 1814     Medical Intervention Limits:    NO intubation (DNI)     Level Of Support Discussed With:    Patient     Code Status (Patient has no pulse and is not breathing):    No CPR (Do Not Attempt to Resuscitate)     Medical Interventions (Patient has pulse or is breathing):    Limited Support       Hayden Morales MD  11/28/23

## 2023-11-29 VITALS
HEIGHT: 68 IN | SYSTOLIC BLOOD PRESSURE: 135 MMHG | DIASTOLIC BLOOD PRESSURE: 69 MMHG | BODY MASS INDEX: 24.71 KG/M2 | TEMPERATURE: 97.9 F | HEART RATE: 90 BPM | RESPIRATION RATE: 16 BRPM | WEIGHT: 163 LBS | OXYGEN SATURATION: 92 %

## 2023-11-29 DIAGNOSIS — D45 POLYCYTHEMIA VERA: Primary | ICD-10-CM

## 2023-11-29 PROBLEM — R09.02 HYPOXIA: Status: ACTIVE | Noted: 2023-11-29

## 2023-11-29 PROBLEM — D72.829 LEUKOCYTOSIS: Status: RESOLVED | Noted: 2023-11-22 | Resolved: 2023-11-29

## 2023-11-29 PROBLEM — R79.89 ELEVATED TROPONIN: Status: RESOLVED | Noted: 2023-11-22 | Resolved: 2023-11-29

## 2023-11-29 PROBLEM — R09.02 HYPOXIA: Status: RESOLVED | Noted: 2023-11-29 | Resolved: 2023-11-29

## 2023-11-29 LAB
ANION GAP SERPL CALCULATED.3IONS-SCNC: 7.6 MMOL/L (ref 5–15)
BUN SERPL-MCNC: 20 MG/DL (ref 8–23)
BUN/CREAT SERPL: 40.8 (ref 7–25)
CA TITR SERPL AGGL: ABNORMAL {TITER}
CALCIUM SPEC-SCNC: 8.6 MG/DL (ref 8.6–10.5)
CHLORIDE SERPL-SCNC: 101 MMOL/L (ref 98–107)
CO2 SERPL-SCNC: 27.4 MMOL/L (ref 22–29)
CREAT SERPL-MCNC: 0.49 MG/DL (ref 0.76–1.27)
DEPRECATED RDW RBC AUTO: 64.2 FL (ref 37–54)
EGFRCR SERPLBLD CKD-EPI 2021: 105 ML/MIN/1.73
EOSINOPHIL # BLD MANUAL: 0.08 10*3/MM3 (ref 0–0.4)
EOSINOPHIL NFR BLD MANUAL: 1 % (ref 0.3–6.2)
ERYTHROCYTE [DISTWIDTH] IN BLOOD BY AUTOMATED COUNT: 16.8 % (ref 12.3–15.4)
GLUCOSE SERPL-MCNC: 110 MG/DL (ref 65–99)
HCT VFR BLD AUTO: 29 % (ref 37.5–51)
HGB BLD-MCNC: 9.1 G/DL (ref 13–17.7)
LYMPHOCYTES # BLD MANUAL: 0.76 10*3/MM3 (ref 0.7–3.1)
LYMPHOCYTES NFR BLD MANUAL: 12.2 % (ref 5–12)
MCH RBC QN AUTO: 33 PG (ref 26.6–33)
MCHC RBC AUTO-ENTMCNC: 31.4 G/DL (ref 31.5–35.7)
MCV RBC AUTO: 105.1 FL (ref 79–97)
METAMYELOCYTES NFR BLD MANUAL: 2 % (ref 0–0)
MONOCYTES # BLD: 1.01 10*3/MM3 (ref 0.1–0.9)
MYELOCYTES NFR BLD MANUAL: 5.1 % (ref 0–0)
NEUTROPHILS # BLD AUTO: 5.83 10*3/MM3 (ref 1.7–7)
NEUTROPHILS NFR BLD MANUAL: 70.4 % (ref 42.7–76)
PLAT MORPH BLD: NORMAL
PLATELET # BLD AUTO: 343 10*3/MM3 (ref 140–450)
PMV BLD AUTO: 10.3 FL (ref 6–12)
POTASSIUM SERPL-SCNC: 4 MMOL/L (ref 3.5–5.2)
RBC # BLD AUTO: 2.76 10*6/MM3 (ref 4.14–5.8)
RBC MORPH BLD: NORMAL
RETICS # AUTO: 0.21 10*6/MM3 (ref 0.02–0.13)
RETICS/RBC NFR AUTO: 7.71 % (ref 0.7–1.9)
SMUDGE CELLS BLD QL SMEAR: ABNORMAL
SODIUM SERPL-SCNC: 136 MMOL/L (ref 136–145)
VARIANT LYMPHS NFR BLD MANUAL: 9.2 % (ref 19.6–45.3)
WBC NRBC COR # BLD AUTO: 8.28 10*3/MM3 (ref 3.4–10.8)

## 2023-11-29 PROCEDURE — 85045 AUTOMATED RETICULOCYTE COUNT: CPT | Performed by: INTERNAL MEDICINE

## 2023-11-29 PROCEDURE — 80048 BASIC METABOLIC PNL TOTAL CA: CPT | Performed by: INTERNAL MEDICINE

## 2023-11-29 PROCEDURE — 99232 SBSQ HOSP IP/OBS MODERATE 35: CPT | Performed by: INTERNAL MEDICINE

## 2023-11-29 PROCEDURE — 85025 COMPLETE CBC W/AUTO DIFF WBC: CPT | Performed by: INTERNAL MEDICINE

## 2023-11-29 PROCEDURE — 85007 BL SMEAR W/DIFF WBC COUNT: CPT | Performed by: INTERNAL MEDICINE

## 2023-11-29 RX ORDER — HYDROCODONE BITARTRATE AND ACETAMINOPHEN 7.5; 325 MG/1; MG/1
1 TABLET ORAL 3 TIMES DAILY
Qty: 12 TABLET | Refills: 0 | Status: SHIPPED | OUTPATIENT
Start: 2023-11-29

## 2023-11-29 RX ORDER — SUCRALFATE 1 G/1
1 TABLET ORAL
Start: 2023-11-29

## 2023-11-29 RX ORDER — HYDROCODONE BITARTRATE AND ACETAMINOPHEN 5; 325 MG/1; MG/1
1 TABLET ORAL EVERY 6 HOURS PRN
Qty: 12 TABLET | Refills: 0 | Status: SHIPPED | OUTPATIENT
Start: 2023-11-29 | End: 2023-11-29 | Stop reason: HOSPADM

## 2023-11-29 RX ORDER — ROSUVASTATIN CALCIUM 20 MG/1
20 TABLET, COATED ORAL NIGHTLY
Start: 2023-11-29

## 2023-11-29 RX ORDER — AMOXICILLIN 250 MG
2 CAPSULE ORAL 2 TIMES DAILY PRN
Start: 2023-11-29

## 2023-11-29 RX ORDER — AMITRIPTYLINE HYDROCHLORIDE 50 MG/1
50 TABLET, FILM COATED ORAL NIGHTLY
Start: 2023-11-29

## 2023-11-29 RX ORDER — LANSOPRAZOLE 30 MG/1
30 TABLET, ORALLY DISINTEGRATING, DELAYED RELEASE ORAL
Start: 2023-11-29

## 2023-11-29 RX ORDER — AMLODIPINE BESYLATE 5 MG/1
5 TABLET ORAL DAILY
Start: 2023-11-29

## 2023-11-29 RX ORDER — ACETAMINOPHEN 325 MG/1
650 TABLET ORAL EVERY 6 HOURS PRN
Start: 2023-11-29

## 2023-11-29 RX ADMIN — ANORECTAL OINTMENT 1 APPLICATION: 15.7; .44; 24; 20.6 OINTMENT TOPICAL at 10:20

## 2023-11-29 RX ADMIN — LANSOPRAZOLE 30 MG: 15 TABLET, ORALLY DISINTEGRATING ORAL at 09:40

## 2023-11-29 RX ADMIN — AMLODIPINE BESYLATE 5 MG: 5 TABLET ORAL at 09:40

## 2023-11-29 RX ADMIN — APIXABAN 2.5 MG: 2.5 TABLET, FILM COATED ORAL at 09:40

## 2023-11-29 RX ADMIN — SUCRALFATE 1 G: 1 TABLET ORAL at 09:40

## 2023-11-29 RX ADMIN — HYDROCODONE BITARTRATE AND ACETAMINOPHEN 1 TABLET: 5; 325 TABLET ORAL at 09:40

## 2023-11-29 RX ADMIN — HYDROCODONE BITARTRATE AND ACETAMINOPHEN 1 TABLET: 5; 325 TABLET ORAL at 02:30

## 2023-11-29 NOTE — CASE MANAGEMENT/SOCIAL WORK
Case Management Discharge Note      Final Note: SNF at [facility] [place] [facility]         Selected Continued Care - Discharged on 11/29/2023 Admission date: 11/22/2023 - Discharge disposition: Skilled Nursing Facility (DC - External)      Destination Coordination complete.      Service Provider Selected Services Address Phone Fax Patient Preferred    [facility] [address] [address] [phone] --              Durable Medical Equipment    No services have been selected for the patient.                Dialysis/Infusion    No services have been selected for the patient.                Home Medical Care    No services have been selected for the patient.                Therapy    No services have been selected for the patient.                Community Resources    No services have been selected for the patient.                Community & DME    No services have been selected for the patient.                    Selected Continued Care - Episodes Includes continued care and service providers with selected services from the active episodes listed below      Oncology- External Fill Episode start date: 5/16/2023 (Paused)   There are no active outsourced providers for this episode.                 Selected Continued Care - Prior Encounters Includes continued care and service providers with selected services from prior encounters from 8/24/2023 to 11/29/2023      Discharged on 11/9/2023 Admission date: 10/23/2023 - Discharge disposition: Skilled Nursing Facility (DC - External)      Destination       Service Provider Selected Services Address Phone Fax Patient Preferred    [facility] [address] [address] [phone] --                          Transportation Services  Ambulance: [facility]    Final Discharge Disposition Code: 03 - skilled nursing facility (SNF)

## 2023-11-29 NOTE — PLAN OF CARE
Problem: Adult Inpatient Plan of Care  Goal: Plan of Care Review  Outcome: Ongoing, Progressing  Flowsheets (Taken 11/29/2023 0504)  Progress: improving  Plan of Care Reviewed With: patient  Goal: Patient-Specific Goal (Individualized)  Outcome: Ongoing, Progressing  Goal: Absence of Hospital-Acquired Illness or Injury  Outcome: Ongoing, Progressing  Intervention: Identify and Manage Fall Risk  Description: Perform standard risk assessment on admission using a validated tool or comprehensive approach appropriate to the patient; reassess fall risk frequently, with change in status or transfer to another level of care.  Communicate fall injury risk to interprofessional healthcare team.  Determine need for increased observation, equipment and environmental modification, such as low bed, signage and supportive, nonskid footwear.  Adjust safety measures to individual developmental age, stage and identified risk factors.  Reinforce the importance of safety and physical activity with patient and family.  Perform regular intentional rounding to assess need for position change, pain assessment and personal needs, including assistance with toileting.  Recent Flowsheet Documentation  Taken 11/29/2023 0401 by Amanda Burgos, RN  Safety Promotion/Fall Prevention:   safety round/check completed   nonskid shoes/slippers when out of bed   fall prevention program maintained   clutter free environment maintained   assistive device/personal items within reach  Taken 11/29/2023 0201 by Amanda Burgos, RN  Safety Promotion/Fall Prevention:   safety round/check completed   nonskid shoes/slippers when out of bed   fall prevention program maintained   clutter free environment maintained   assistive device/personal items within reach  Taken 11/29/2023 0001 by Amanda Burgos, RN  Safety Promotion/Fall Prevention:   safety round/check completed   nonskid shoes/slippers when out of bed   fall prevention program maintained   clutter free  environment maintained   assistive device/personal items within reach  Taken 11/28/2023 2220 by Amanda Burgos RN  Safety Promotion/Fall Prevention:   safety round/check completed   nonskid shoes/slippers when out of bed   fall prevention program maintained   clutter free environment maintained   assistive device/personal items within reach  Taken 11/28/2023 2106 by Amanda Burgos RN  Safety Promotion/Fall Prevention:   safety round/check completed   nonskid shoes/slippers when out of bed   fall prevention program maintained   assistive device/personal items within reach   clutter free environment maintained  Intervention: Prevent Skin Injury  Description: Perform a screening for skin injury risk, such as pressure or moisture associated skin damage on admission and at regular intervals throughout hospital stay.  Keep all areas of skin (especially folds) clean and dry.  Maintain adequate skin hydration.  Relieve and redistribute pressure and protect bony prominences; implement measures based on patient-specific risk factors.  Match turning and repositioning schedule to clinical condition.  Encourage weight shift frequently; assist with reposition if unable to complete independently.  Float heels off bed; avoid pressure on the Achilles tendon.  Keep skin free from extended contact with medical devices.  Encourage functional activity and mobility, as early as tolerated.  Use aids (e.g., slide boards, mechanical lift) during transfer.  Recent Flowsheet Documentation  Taken 11/29/2023 0401 by Amanda Burgos RN  Body Position: right  Taken 11/29/2023 0201 by Amanda Burgos RN  Body Position: left  Taken 11/29/2023 0001 by Amanda Burgos RN  Body Position:   turned   right  Taken 11/28/2023 2220 by Amanda Burgos RN  Body Position: left  Taken 11/28/2023 2106 by Amanda Burgos RN  Body Position:   turned   left  Skin Protection: adhesive use limited  Intervention: Prevent and Manage VTE (Venous  Thromboembolism) Risk  Description: Assess for VTE (venous thromboembolism) risk.  Encourage and assist with early ambulation.  Initiate and maintain compression or other therapy, as indicated, based on identified risk in accordance with organizational protocol and provider order.  Encourage both active and passive leg exercises while in bed, if unable to ambulate.  Recent Flowsheet Documentation  Taken 11/29/2023 0401 by Amanda Burgos RN  Activity Management: activity minimized  Taken 11/29/2023 0201 by Amanda Burgos RN  Activity Management: activity minimized  Taken 11/29/2023 0001 by Amanda Burgos RN  Activity Management: activity encouraged  Taken 11/28/2023 2220 by Amanda Burgos RN  Activity Management: activity encouraged  Taken 11/28/2023 2106 by Amanda Burgos RN  Activity Management: activity encouraged  Goal: Optimal Comfort and Wellbeing  Outcome: Ongoing, Progressing  Intervention: Provide Person-Centered Care  Description: Use a family-focused approach to care.  Develop trust and rapport by proactively providing information, encouraging questions, addressing concerns and offering reassurance.  Acknowledge emotional response to hospitalization.  Recognize and utilize personal coping strategies.  Honor spiritual and cultural preferences.  Recent Flowsheet Documentation  Taken 11/28/2023 2106 by Amanda Burgos RN  Trust Relationship/Rapport:   care explained   questions answered  Goal: Readiness for Transition of Care  Outcome: Ongoing, Progressing     Problem: Heart Failure Comorbidity  Goal: Maintenance of Heart Failure Symptom Control  Outcome: Ongoing, Progressing  Intervention: Maintain Heart Failure-Management  Description: Evaluate adherence to home heart failure self-care regimen (e.g., medication, fluid balance, sodium intake, daily weight, physical activity, telemonitoring, support).  Advocate continuation of home medication and schedule.  Consider pharmacologic therapy  administration time and effects (e.g., avoid giving diuretic prior to bedtime or nitrates on empty stomach).  Monitor response to pharmacologic therapy, including weight fluctuations, blood pressure and electrolyte levels.  Monitor for signs and symptoms of anxiety and depression, including severity and duration; if present, provide psychosocial support.  Consider need for heart failure clinic or palliative care consult.  Recent Flowsheet Documentation  Taken 11/28/2023 2106 by Amanda Burgos RN  Medication Review/Management: medications reviewed     Problem: Hypertension Comorbidity  Goal: Blood Pressure in Desired Range  Outcome: Ongoing, Progressing  Intervention: Maintain Blood Pressure Management  Description: Evaluate adherence to home antihypertensive regimen (e.g., exercise and activity, diet modification, medication).  Provide scheduled antihypertensive medication; consider administration time and effects (e.g., avoid giving diuretic prior to bedtime).  Monitor response to antihypertensive medication therapy (e.g., blood pressure, electrolyte levels, medication effects).  Minimize risk of orthostatic hypotension; encourage caution with position changes, particularly if elderly.  Recent Flowsheet Documentation  Taken 11/28/2023 2106 by Amanda Burgos RN  Medication Review/Management: medications reviewed     Problem: Skin Injury Risk Increased  Goal: Skin Health and Integrity  Outcome: Ongoing, Progressing  Intervention: Optimize Skin Protection  Description: Perform a full pressure injury risk assessment, as indicated by screening, upon admission to care unit.  Reassess skin (injury risk, full inspection) frequently (e.g., scheduled interval, with change in condition) to provide optimal early detection and prevention.  Maintain adequate tissue perfusion (e.g., encourage fluid balance; avoid crossing legs, constrictive clothing or devices) to promote tissue oxygenation.  Maintain head of bed at lowest  degree of elevation tolerated, considering medical condition and other restrictions.  Avoid positioning onto an area that remains reddened.  Minimize incontinence and moisture (e.g., toileting schedule; moisture-wicking pad, diaper or incontinence collection device; skin moisture barrier).  Cleanse skin promptly and gently when soiled utilizing a pH-balanced cleanser.  Relieve and redistribute pressure (e.g., scheduled position changes, weight shifts, use of support surface, medical device repositioning, protective dressing application, use of positioning device, microclimate control, use of pressure-injury-monitor  Encourage increased activity, such as sitting in a chair at the bedside or early mobilization, when able to tolerate.  Recent Flowsheet Documentation  Taken 11/29/2023 0401 by Amanda Burgos RN  Head of Bed (HOB) Positioning: HOB elevated  Taken 11/29/2023 0201 by Amanda Burgos RN  Head of Bed (HOB) Positioning: HOB elevated  Taken 11/29/2023 0001 by Amanda Burgos RN  Head of Bed (HOB) Positioning: HOB elevated  Taken 11/28/2023 2220 by Amanda Burgos RN  Head of Bed (HOB) Positioning: HOB elevated  Taken 11/28/2023 2106 by Amanda Burgos RN  Pressure Reduction Techniques:   weight shift assistance provided   sit time limited to 2 hours   frequent weight shift encouraged  Head of Bed (HOB) Positioning: HOB elevated  Pressure Reduction Devices: specialty bed utilized  Skin Protection: adhesive use limited     Problem: Fall Injury Risk  Goal: Absence of Fall and Fall-Related Injury  Outcome: Ongoing, Progressing  Intervention: Identify and Manage Contributors  Description: Develop a fall prevention plan with the patient and caregiver/family.  Provide reorientation, appropriate sensory stimulation and routines with changes in mental status to decrease risk of fall.  Promote use of personal vision and auditory aids.  Assess assistance level required for safe and effective self-care; provide  support as needed, such as toileting, mobilization. For age 65 and older, implement timed toileting with assistance.  Encourage physical activity, such as performance of mobility and self-care at highest level of patient ability, multicomponent exercise program and provision of appropriate assistive devices.  If fall occurs, assess the severity of injury; implement fall injury protocol. Determine the cause and revise fall injury prevention plan.  Regularly review medication contribution to fall risk; adjust medication administration times to minimize risk of falling.  Consider risk related to polypharmacy and age.  Balance adequate pain management with potential for oversedation.  Recent Flowsheet Documentation  Taken 11/28/2023 2106 by Amanda Burgos, RN  Medication Review/Management: medications reviewed  Intervention: Promote Injury-Free Environment  Description: Provide a safe, barrier-free environment that encourages independent activity.  Keep care area uncluttered and well-lighted.  Determine need for increased observation or monitoring.  Avoid use of devices that minimize mobility, such as restraints or indwelling urinary catheter.  Recent Flowsheet Documentation  Taken 11/29/2023 0401 by Amanda Burgos, RN  Safety Promotion/Fall Prevention:   safety round/check completed   nonskid shoes/slippers when out of bed   fall prevention program maintained   clutter free environment maintained   assistive device/personal items within reach  Taken 11/29/2023 0201 by Amanda Burgos, RN  Safety Promotion/Fall Prevention:   safety round/check completed   nonskid shoes/slippers when out of bed   fall prevention program maintained   clutter free environment maintained   assistive device/personal items within reach  Taken 11/29/2023 0001 by Amanda Burgos, RN  Safety Promotion/Fall Prevention:   safety round/check completed   nonskid shoes/slippers when out of bed   fall prevention program maintained   clutter free  environment maintained   assistive device/personal items within reach  Taken 11/28/2023 2220 by Amanda Burgos, RN  Safety Promotion/Fall Prevention:   safety round/check completed   nonskid shoes/slippers when out of bed   fall prevention program maintained   clutter free environment maintained   assistive device/personal items within reach  Taken 11/28/2023 2106 by Amanda Burgos, RN  Safety Promotion/Fall Prevention:   safety round/check completed   nonskid shoes/slippers when out of bed   fall prevention program maintained   assistive device/personal items within reach   clutter free environment maintained     Problem: Adjustment to Illness (Sepsis/Septic Shock)  Goal: Optimal Coping  Outcome: Ongoing, Progressing     Problem: Bleeding (Sepsis/Septic Shock)  Goal: Absence of Bleeding  Outcome: Ongoing, Progressing     Problem: Glycemic Control Impaired (Sepsis/Septic Shock)  Goal: Blood Glucose Level Within Desired Range  Outcome: Ongoing, Progressing     Problem: Infection Progression (Sepsis/Septic Shock)  Goal: Absence of Infection Signs and Symptoms  Outcome: Ongoing, Progressing  Intervention: Promote Recovery  Description: Encourage pulmonary hygiene, such as cough-enhancement and airway-clearance techniques, that may include use of incentive spirometry, deep breathing and cough.  Encourage early rehabilitation and physical activity to optimize functional ability and activity tolerance, as well as minimize delirium.  Promote energy conservation; minimize oxygen demand and consumption by adjusting environment, decreasing stimulation, maintaining normothermia and treating pain.  Optimize fluid balance, nutrition intake, sleep and glycemic control to maintain tissue perfusion and enhance immune response.  Recent Flowsheet Documentation  Taken 11/29/2023 0401 by Amanda Burgos, RN  Activity Management: activity minimized  Taken 11/29/2023 0201 by Amanda Burgos RN  Activity Management: activity  minimized  Taken 11/29/2023 0001 by Amanda Burgos, RN  Activity Management: activity encouraged  Taken 11/28/2023 2220 by Amanda Burgos, RN  Activity Management: activity encouraged  Taken 11/28/2023 2106 by Amanda Burgos, RN  Activity Management: activity encouraged     Problem: Nutrition Impaired (Sepsis/Septic Shock)  Goal: Optimal Nutrition Intake  Outcome: Ongoing, Progressing   Goal Outcome Evaluation:  Plan of Care Reviewed With: patient        Progress: improving

## 2023-11-29 NOTE — DISCHARGE SUMMARY
[facility] Medicine Services  DISCHARGE SUMMARY    Patient Name: Madhu Santoro  : 1945  MRN: [id number]    Date of Admission: 2023  9:35 AM  Date of Discharge:  2023  Primary Care Physician: Damian Sanchez MD    Consults       Date and Time Order Name Status Description    2023 12:29 PM Inpatient Infectious Diseases Consult Completed     2023 12:16 PM Inpatient Gastroenterology Consult Completed     2023 12:38 PM Hematology & Oncology Inpatient Consult      11/3/2023  3:24 PM Inpatient General Surgery Consult Completed     2023 10:17 AM Inpatient Cardiology Consult Completed     10/30/2023 10:53 AM Inpatient ENT Consult Completed             Hospital Course       Active Hospital Problems    Diagnosis  POA    **Weakness [R53.1]  Yes    Oral phase dysphagia [R13.11]  Yes    Acute blood loss anemia [D62]  Yes    Anemia [D64.9]  Yes    History of CVA (cerebrovascular accident) [Z86.73]  Not Applicable    S/P percutaneous endoscopic gastrostomy (PEG) tube placement [Z93.1]  Not Applicable    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    Sacral decubitus ulcer [L89.159]  Yes    Polycythemia [D75.1]  Yes    Chronic anticoagulation [Z79.01]  Not Applicable    PAD (peripheral artery disease) [I73.9]  Yes    S/P CABG (coronary artery bypass graft) [Z95.1]  Not Applicable    Typical atrial flutter [I48.3]  Yes    S/P ablation of atrial flutter [Z98.890, Z86.79]  Not Applicable    Former smoker [Z87.891]  Not Applicable    Gastroesophageal reflux disease [K21.9]  Yes    Benign essential hypertension [I10]  Yes    Arthritis [M19.90]  Yes    HLD (hyperlipidemia) [E78.5]  Yes    Anxiety [F41.9]  Yes      Resolved Hospital Problems    Diagnosis Date Resolved POA    Hypoxia [R09.02] 2023 Yes    Elevated troponin [R79.89] 2023 Yes    Leukocytosis [D72.829] 2023 Yes          Hospital Course:  Madhu Santoro is a 77 y.o. male presents to the hospital with weakness and  worsening anemia.  He was found to have GI bleed most likely related to recent PEG tube and related to anticoagulation with Eliquis        Anemia, blood loss, and GI bleed:  History of polycythemia vera, currently off hydroxyurea  Hemoccult positive  PPI to twice daily.  Carafate  Gastroenterology following  Possible source could be recent surgical placement of PEG feeding tube with NOAC or other etiology.  Patient and family did not want to pursue endoscopy.  PPI changed to twice daily.  Gastroenterology agreed with this plan and have signed off.  Blood thinners discussed with oncology and gastroenterology ultimately we decided to transition Eliquis to low-dose and hold aspirin for now and possibly discontinue aspirin permanently based on risk versus benefit assessment of all treatment teams.  Once the PEG has been present for a month or so he could speak with his cardiologist about whether or not they absolutely feel that the aspirin needs to be added to the NOAC because this will increase risk of recurrent bleeding.  NOAC initially held, now restarted at low-dose.  He is tolerating well.  No sign of bleeding.     Recent COVID: Resolved, no current respiratory issues     Oral dysphagia:  Currently receiving tube feeds via PEG tube.  Maximize nutrition.  Continue tube feeding as per below.  Recommend intermittent evaluations from speech therapy for dysphagia.     Decubitus ulcer:  Wound images reviewed.  Continue wound care frequent turns, maximize nutrition, and barrier cream.  Superficial wound culture was positive but polymicrobial and likely has substantial contaminant and no obvious sign of deep infection.  Infectious disease evaluated and recommended to hold off antibiotics for now based on risk versus benefit assessment.  Currently the most important thing is to make sure he has barrier cream on at all times and frequent turns as well as keeping the wound dry and clean from stool.  Make sure he is changed  "quickly after stooling to help with wound healing.  Primary care follow-up and if this worsens over the time he could be referred to a wound care clinic.     Polycythemia: Currently off hydroxyurea.  Oncology   Oncology workup initiated.  Oncology has signed off and cleared him for discharge.  [facility] is arranging with SNF to have labs sent to their office.  Please follow instructions as per below.  Per [facility]:   \"Please hold hydrea at d/c and start folic acid daily 1 mg; needs cbc with diff every Monday/Thursday faxed to [phone] while at [facility]\" Damian Daly MD       History of paroxysmal atrial flutter and stroke:  History of ablation.  Chronically anticoagulation currently held due to anemia and GI bleed initially.  Bleeding resolved and he is now tolerating low-dose Eliquis without issue.      Discussion/plan:  Patient appears to be tolerating low-dose Eliquis.   Case discussed with infectious disease based on recent CT scan images.  CT scan of the abdomen does show bibasilar infiltrates.  With aphasia we feel this is more likely some chronic aspiration pneumonitis.  Patient is afebrile without leukocytosis and per my conversation with infectious disease team at this point we think the risks of antibiotics outweigh the benefits and that antibiotics are not needed at this time.   Case management is going to work on possible transfer to SNF today. Case discussed with infectious disease.  It is not felt that patient has active cellulitis or deep tissue injury and we will continue to treat with wound care for decubitus ulcer.  Maximizing nutrition is much as possible also be needed as well as strengthening.       Family would like to avoid aggressive endoscopic evaluation at this time.  Suspect etiology of bleeding is related to recent PEG healing.  PPI has been increased to twice daily.      Case discussed with oncologist today we will hold aspirin at discharge as per above.     Treatment plan " discussed with family who are in agreement.       At the time of discharge take all medications as prescribed, keep all follow-up appointments, and call your doctor or return to the hospital if you have any worsening or concerning symptoms.    Please note that this note was made using Dragon voice recognition software            Day of Discharge     Subjective:  Patient and his daughter are adamant they would like a higher dose of Norco.  They would like him to go back up to the 10 mg but he has been on the 5 mg here taking approximately 3 times daily.  We agreed we would increase him to 7.5 mg and they asked if it could be scheduled 3 times daily.  I said this is reasonable but it will need to be hold if he has any sedation.  We discussed risk versus benefits of narcotics and appropriate plan.  They feel he is stable to transfer today and agree with plan to go to SNF.  Patient also agrees with this plan.  He is eager to try to get stronger.  He is resting by aided by his illness and his recent medical setbacks but says he is motivated to work with therapy.  He agrees to follow-up with primary care provider and with oncology.    Vital Signs:   Temp:  [97.8 °F (36.6 °C)-98.7 °F (37.1 °C)] 97.9 °F (36.6 °C)  Heart Rate:  [88-97] 90  Resp:  [16-18] 16  BP: (128-135)/(62-69) 135/69     Physical Exam:    Constitutional:Awake, alert, somewhat chronically ill-appearing but improved throughout the hospital stay generally  HENT: NCAT, mucous membranes moist, neck supple  Respiratory: No cough or wheezing, nonlabored breathing, moderate inspiration  Cardiovascular: Pulse rate is normal, normal radial pulses  Gastrointestinal: PEG tube, soft, nontender, nondistended  Musculoskeletal: Elderly and somewhat chronically debilitated in appearance, minimal lower extremity edema, BMI is 24  Psychiatric: Appropriate affect, cooperative, conversational  Neurologic: No slurred speech or facial droop, follows commands  Skin: Pale,  decubitus ulcer present in the gluteal region, no rashes or jaundice, warm       Pertinent  and/or Most Recent Results     Results from last 7 days   Lab Units 11/29/23  0639 11/28/23  0656 11/27/23  0646 11/26/23  0702 11/25/23  0654 11/24/23  0612 11/23/23  2039 11/23/23  0815 11/23/23  0615   WBC 10*3/mm3 8.28 9.50 9.77 8.88 8.92 8.98  --   --  9.05   HEMOGLOBIN g/dL 9.1* 9.7* 9.2* 9.3* 8.5* 8.5*  --   --  6.9*   HEMATOCRIT % 29.0* 30.6* 29.1* 28.9* 26.8* 26.9*  --   --  21.8*   PLATELETS 10*3/mm3 343 335 311 366 406 443  --   --  418   SODIUM mmol/L 136 136 136 135* 134* 140  --  136 139   POTASSIUM mmol/L 4.0 4.2 4.0 4.1 4.3 4.2 4.9 3.1* 3.1*   CHLORIDE mmol/L 101 102 103 101 103 106  --  101 102   CO2 mmol/L 27.4 25.0 23.5 26.0 24.8 25.5  --  29.8* 29.7*   BUN mg/dL 20 20 19 17 18 18  --  14 13   CREATININE mg/dL 0.49* 0.52* 0.60* 0.53* 0.59* 0.54*  --  0.58* 0.55*   GLUCOSE mg/dL 110* 114* 126* 92 126* 101*  --  87 84   CALCIUM mg/dL 8.6 8.7 8.4* 8.4* 8.1* 8.2*  --  8.1* 7.9*     Results from last 7 days   Lab Units 11/25/23  1712 11/24/23  0612 11/23/23  0815   BILIRUBIN mg/dL  --  0.8 0.9   ALK PHOS U/L  --  235* 207*   ALT (SGPT) U/L  --  52* 50*   AST (SGOT) U/L  --  62* 51*   PROTIME Seconds 14.6*  --   --    INR  1.13*  --   --    APTT seconds 34.9  --   --            Microbiology Results Abnormal       Procedure Component Value - Date/Time    Blood Culture - Blood, Arm, Left [[id number]]  (Normal) Collected: 11/22/23 1819    Lab Status: Final result Specimen: Blood from Arm, Left Updated: 11/27/23 1900     Blood Culture No growth at 5 days    Blood Culture - Blood, Arm, Right [[id number]]  (Normal) Collected: 11/22/23 1819    Lab Status: Final result Specimen: Blood from Arm, Right Updated: 11/27/23 1900     Blood Culture No growth at 5 days    Respiratory Panel PCR w/COVID-19(SARS-CoV-2) [facility] In-House, NP Swab in UTM/VTM, 2 HR TAT - Swab, Nasopharynx [[id number]]  (Normal) Collected:  11/22/23 1437    Lab Status: Final result Specimen: Swab from Nasopharynx Updated: 11/22/23 1602     ADENOVIRUS, PCR Not Detected     Coronavirus 229E Not Detected     Coronavirus HKU1 Not Detected     Coronavirus NL63 Not Detected     Coronavirus OC43 Not Detected     COVID19 Not Detected     Human Metapneumovirus Not Detected     Human Rhinovirus/Enterovirus Not Detected     Influenza A PCR Not Detected     Influenza B PCR Not Detected     Parainfluenza Virus 1 Not Detected     Parainfluenza Virus 2 Not Detected     Parainfluenza Virus 3 Not Detected     Parainfluenza Virus 4 Not Detected     RSV, PCR Not Detected     Bordetella pertussis pcr Not Detected     Bordetella parapertussis PCR Not Detected     Chlamydophila pneumoniae PCR Not Detected     Mycoplasma pneumo by PCR Not Detected    Narrative:      In the setting of a positive respiratory panel with a viral infection PLUS a negative procalcitonin without other underlying concern for bacterial infection, consider observing off antibiotics or discontinuation of antibiotics and continue supportive care. If the respiratory panel is positive for atypical bacterial infection (Bordetella pertussis, Chlamydophila pneumoniae, or Mycoplasma pneumoniae), consider antibiotic de-escalation to target atypical bacterial infection.            Imaging Results (All)       Procedure Component Value Units Date/Time    CT Abdomen Pelvis With Contrast [[id number]] Collected: 11/27/23 1247     Updated: 11/27/23 1254    Narrative:      EXAMINATION: CT OF THE ABDOMEN AND PELVIS WITH CONTRAST     TECHNIQUE: Computed tomography of the abdomen and pelvis after the  uneventful administration of nonionic intravenous contrast per  pancreatic protocol. Radiation dose reduction techniques were utilized,  including automated exposure control and exposure modulation based on  body size.     HISTORY: Pancreatic mass     COMPARISON: 8/22/2022     FINDINGS: Limited evaluation of the inferior  thorax demonstrates  emphysematous changes and atelectasis and scarring with superimposed  patchy left greater than right lower lobe opacities, possibly pneumonia.  No pneumothorax is seen. There is bilateral gynecomastia. The heart is  normal in size and configuration, without pericardial effusion. Coronary  calcifications are demonstrated. Median sternotomy changes are seen.     The gallbladder is absent. There is mild intrahepatic biliary ductal  dilation. Low-attenuation renal lesions are technically indeterminate,  likely cysts. There are stable tiny cystic pancreatic lesions, for  example in uncinate process lesion measuring 1.5 x 0.9 cm on series 3,  image 82. No enlarged or suspiciously enhancing lesions are seen.     There is a gastric tube. Colonic diverticula are seen, without evidence  of acute diverticulitis. The gallbladder is absent. A punctate  nonobstructing stone is seen in the right kidney. Low-attenuation renal  lesions are technically indeterminate, likely cysts.        The spleen, adrenal glands, stomach, small bowel, urinary bladder, and  abdominal vasculature are normal. No intraperitoneal fluid collection or  free gas are seen. No enlarged lymph nodes are demonstrated.     Bone windows demonstrate degenerative changes, without suspicious  osseous lesion seen.       Impression:      1. FINDINGS suggestive of bilateral lower lobe pneumonia     2. 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 Consider 1 year follow-up MRCP if clinically.     3. Incidental findings as above.     This report was finalized on 11/27/2023 12:51 PM by Dr. Giorgio Del Rosario M.D on Workstation: [id number]       XR Chest 1 View [[id number]] Collected: 11/22/23 1027     Updated: 11/22/23 1033    Narrative:      XR CHEST 1 VW-     HISTORY: Male who is 77 years-old, infection     TECHNIQUE: Frontal view of the chest     COMPARISON: 11/8/2023     FINDINGS: The heart size is normal. Sternotomy  wires are present. Aorta  is calcified. Pulmonary vasculature is unremarkable. Old granulomatous  disease is present. Small likely scarring or atelectasis is seen in both  lungs. No large pleural effusion. No pneumothorax. No acute osseous  process.       Impression:      As described.           This report was finalized on 11/22/2023 10:30 AM by Dr. Mukesh Bruce M.D on Workstation: [id number]               Results for orders placed during the hospital encounter of 11/22/23    Duplex Venous Lower Extremity - Bilateral CAR    Interpretation Summary    Normal bilateral lower extremity venous duplex scan.      Results for orders placed during the hospital encounter of 11/22/23    Duplex Venous Lower Extremity - Bilateral CAR    Interpretation Summary    Normal bilateral lower extremity venous duplex scan.      Results for orders placed during the hospital encounter of 10/23/23    Adult Transthoracic Echo Complete W/ Cont if Necessary Per Protocol    Interpretation Summary    Left ventricular systolic function is normal. Calculated left ventricular EF = 62.8%    Left ventricular wall thickness is consistent with concentric remodelling.    Left ventricular diastolic function was normal.    Normal right ventricular size and function present.      Plan for Follow-up of Pending Labs/Results: Stable pancreas lesion will be followed up in oncology clinic.  Labs below will be followed up in oncology clinic as results are pending  Pending Labs       Order Current Status    Cold Agglutinin Titer In process    PNH Profile In process          Discharge Details        Discharge Medications        New Medications        Instructions Start Date   acetaminophen 325 MG tablet  Commonly known as: TYLENOL   650 mg, Per G Tube, Every 6 Hours PRN      HYDROcodone-acetaminophen 7.5-325 MG per tablet  Commonly known as: Norco  Replaces: HYDROcodone-acetaminophen 5-325 MG per tablet   1 tablet, Oral, 3 Times Daily, Hold if sedation       Menthol-Zinc Oxide 0.44-20.6 % ointment   1 application , Topical, Every 12 Hours Scheduled      sennosides-docusate 8.6-50 MG per tablet  Commonly known as: PERICOLACE   2 tablets, Per G Tube, 2 Times Daily PRN             Changes to Medications        Instructions Start Date   amitriptyline 50 MG tablet  Commonly known as: ELAVIL  What changed: how to take this   50 mg, Per G Tube, Nightly      amLODIPine 5 MG tablet  Commonly known as: NORVASC  What changed: how to take this   5 mg, Per G Tube, Daily      apixaban 2.5 MG tablet tablet  Commonly known as: ELIQUIS  What changed:   medication strength  how much to take  when to take this   2.5 mg, Oral, Every 12 Hours Scheduled      lansoprazole 30 MG Tablet Delayed Release Dispersible disintegrating tablet  Commonly known as: PREVACID SOLUTAB  What changed:   how to take this  when to take this   30 mg, Per G Tube, 2 Times Daily Before Meals      rosuvastatin 20 MG tablet  Commonly known as: Crestor  What changed:   how to take this  when to take this   20 mg, Per G Tube, Nightly      sucralfate 1 g tablet  Commonly known as: CARAFATE  What changed: how to take this   1 g, Per G Tube, 2 Times Daily Before Meals             Continue These Medications        Instructions Start Date   guaifenesin 100 MG/5ML liquid  Commonly known as: ROBITUSSIN   200 mg, Oral, Every 4 Hours PRN      ipratropium-albuterol 0.5-2.5 mg/3 ml nebulizer  Commonly known as: DUO-NEB   3 mL, Nebulization, Every 4 Hours PRN             Stop These Medications      aluminum-magnesium hydroxide-simethicone 200-200-20 MG/5ML suspension  Commonly known as: MAALOX/MYLANTA     furosemide 20 MG tablet  Commonly known as: LASIX     HYDROcodone-acetaminophen 5-325 MG per tablet  Commonly known as: NORCO  Replaced by: HYDROcodone-acetaminophen 7.5-325 MG per tablet     mupirocin 2 % cream  Commonly known as: BACTROBAN     naloxone 4 MG/0.1ML nasal spray  Commonly known as: NARCAN     oxyBUTYnin 5 MG/5ML  solution oral solution  Commonly known as: DITROPAN     pantoprazole 40 MG EC tablet  Commonly known as: PROTONIX              Allergies   Allergen Reactions    Atorvastatin Myalgia     Myalgia    Penicillins Hives, Swelling and Rash     Tolerates cephalosporins          Discharge Disposition:  Skilled Nursing Facility (DC - External)    Diet:  Hospital:  Diet Order   Procedures    NPO Diet NPO Type: Strict NPO       Activity:  Activity Instructions       Activity as Tolerated                   CODE STATUS:    Code Status and Medical Interventions:   Ordered at: 11/22/23 1814     Medical Intervention Limits:    NO intubation (DNI)     Level Of Support Discussed With:    Patient     Code Status (Patient has no pulse and is not breathing):    No CPR (Do Not Attempt to Resuscitate)     Medical Interventions (Patient has pulse or is breathing):    Limited Support       Future Appointments   Date Time Provider Department Center   12/8/2023  9:30 AM [facility] 1 [facility] None   1/8/2024  3:30 PM LAB CHAIR 3 [facility] [place]   1/8/2024  3:40 PM SPECIALTY PHARMACY [facility] INFUS [facility]   1/8/2024  4:20 PM Aquiles Lopez MD [facility]   1/16/2024 11:20 AM Massimo Jordan MD [facility]   2/12/2024  2:00 PM Nisa Mittal APRN [facility] N [facility]       Additional Instructions for the Follow-ups that You Need to Schedule       Discharge Follow-up with PCP   As directed       Currently Documented PCP:    Damian Sanchez MD    PCP Phone Number:    [phone]     Follow Up Details: 1 week after discharge from SNF        Discharge Follow-up with Specified Provider: [facility] for anemia follow up, call with questions; 3 Months   As directed      To: [facility] for anemia follow up, call with questions   Follow Up: 3 Months                Additional Instructions for the Follow-ups that You Need to Schedule       Discharge  Follow-up with PCP   As directed       Currently Documented PCP:    Damian Sanchez MD    PCP Phone Number:    [phone]     Follow Up Details: 1 week after discharge from SNF        Discharge Follow-up with Specified Provider: [facility] for anemia follow up, call with questions; 3 Months   As directed      To: [facility] for anemia follow up, call with questions   Follow Up: 3 Months               Contact information for follow-up providers       Damian Sanchez MD .    Specialty: Family Medicine  Why: 1 week after discharge from [facility]  Contact information:  [address] [address]  [place] [address]  [phone]                       Contact information for after-discharge care       Destination       [facility] .    Service: Skilled Nursing  Contact information:  [address] [address] [phone]                                       Hayden Morales MD  11/29/23      Time Spent on Discharge:  I spent greater than 40 minutes on this discharge activity which included: face-to-face encounter with the patient, reviewing the data in the system, coordination of the care with the nursing staff as well as consultants, documentation, and entering orders.

## 2023-11-29 NOTE — PROGRESS NOTES
Subjective   REASON FOR FOLLOW-UP: Polycythemia vera, JAK2 mutation detected    History of Present Illness  Patient is a 77-year-old male with follow-up in the office for polycythemia vera on low-dose hydroxyurea recently hospitalized with COVID-pneumonia and discharged to the nursing home readmitted with anemia hemoglobin of 7.3.  There is no mention made of obvious bleeding the patient denies bleeding but I am not sure he is aware of the color of his stool etc.  Reticulocyte count was unexpectedly high at 11% which suggest hemolysis although his acute bleeding this can be seen.  Iron saturation 19% ferritin 566 B12 and folate normal     Patient has had transfusion and feels much better.  Of note he has been off hydroxyurea in the nursing home since discharge so this is not playing a role.  His hemoglobin at discharge from 11/9/2023 was 8.8 and the only procedure he has had done placement of the PEG tube which is unlikely to cause significant bleeding.     His daughter states he is complaining of dyspepsia and heartburn in his concern for GI bleed.  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  The patient appears depressed and globally weak.  He and his wife both frustrated by lack of progress with respect to physical therapy and ability to eat    11/28  Patient feels the same-very weak.  No significant shortness of breath  lightheadedness dizziness.  CBC is pending currently.    11/29  Discharge orders to rehab in place this morning.  Patient clinically the same.  His hemoglobin is 9.1 stable but reticulocyte count remains elevated 7.7% and left shifted myeloid cells on the smear.    Past Medical History, Past Surgical History, Social History, Family History have been reviewed and are without significant changes except as mentioned.    Review of Systems   Constitutional:  Positive for activity change, appetite change and fatigue. Negative for fever.   HENT:  Positive for trouble swallowing.    Respiratory:  Negative for cough and shortness of breath.    Gastrointestinal:  Negative for diarrhea and vomiting.   Endocrine: Negative.    Musculoskeletal:  Positive for gait problem.   Skin: Negative.    Neurological:  Positive for weakness.   Psychiatric/Behavioral:  Positive for dysphoric mood.      A comprehensive 14 point review of systems was performed and was negative except as mentioned.    Medications:  The current medication list was reviewed in the EMR    ALLERGIES:    Allergies   Allergen Reactions    Atorvastatin Myalgia     Myalgia    Penicillins Hives, Swelling and Rash     Tolerates cephalosporins        Objective      Vitals:    11/28/23 0745 11/28/23 1900 11/28/23 2350 11/29/23 0712   BP: 135/85 128/62  135/69   BP Location: Right arm Left arm  Left arm   Patient Position: Lying Lying  Lying   Pulse: 95 97 88 90   Resp: 18 18 18 16   Temp: 99.3 °F (37.4 °C) 98.7 °F (37.1 °C) 97.8 °F (36.6 °C) 97.9 °F (36.6 °C)   TempSrc: Oral Oral Oral Oral   SpO2: 95% 93% 95% 92%   Weight:       Height:             10/20/2023     1:51 PM   Current Status   ECOG score 0       Physical Exam    CONSTITUTIONAL: pleasant elderly man chronic ill-appearing  HEENT: no icterus, no thrush, moist membranes  LYMPH: no cervical or supraclavicular lad  CV: RRR, S1S2, no murmur  RESP: cta bilat, no wheezing, no rales  GI: soft, non-tender,  +bs  MUSC: no  edema   NEURO: alert and oriented x3, significant global weakness  PSYCH: Very flat affect       RECENT LABS:  Hematology WBC   Date Value Ref Range Status   11/29/2023 8.28 3.40 - 10.80 10*3/mm3 Final     RBC   Date Value Ref Range Status   11/29/2023 2.76 (L) 4.14 - 5.80 10*6/mm3 Final     Hemoglobin   Date Value Ref Range Status   11/29/2023 9.1 (L) 13.0 - 17.7 g/dL Final     Hematocrit   Date Value Ref Range Status   11/29/2023 29.0 (L) 37.5 - 51.0 % Final     Platelets   Date Value Ref Range Status   11/29/2023 343 140 - 450 10*3/mm3 Final     Lab Results   Component Value Date    GLUCOSE 110 (H) 11/29/2023    BUN 20 11/29/2023    CREATININE 0.49 (L) 11/29/2023    EGFRRESULT 65.8 07/03/2023    EGFR 105.0 11/29/2023    BCR 40.8 (H) 11/29/2023    K 4.0 11/29/2023    CO2 27.4 11/29/2023    CALCIUM 8.6 11/29/2023    PROTENTOTREF 6.9 10/20/2023    ALBUMIN 2.3 (L) 11/24/2023    BILITOT 0.8 11/24/2023    AST 62 (H) 11/24/2023    ALT 52 (H) 11/24/2023            Venous duplex bilateral lower extremities 11/26/2023-normal exam    Assessment & Plan     *Hospitalized in mid October to early November with COVID-pneumonia with profound deconditioning discharged to nursing home  Readmitted 11/22/2023 with anemia-patient has not been on hydroxyurea at discharge from the hospital and remains on Eliquis  MCV is dropped significantly suggesting iron depletion and blood loss (but also may be related to being off Hydrea)  Patient himself denies hematochezia or melena  B12 folate normal ferritin 566 iron saturation 12% reticulocyte count 9% rule out hemolysis although acute blood loss can also cause an elevated reticulocyte  LDH elevated 356 haptoglobin low at 24-suggest hemolysis suggest hemolysis   Crossmatch compatible suggesting indirect claudia neg  Direct claudia Complement +?  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  These indices have been slowly reducing over the last 2 years approximately followed more closely.  Concurrently is a mild drop in his baseline hemoglobin still well within normal limits.  thrombocytosis thought, initially, to be related to iron deficiency.  Ferritin found to be 16.3 and iron of 26 with 5% saturation and TIBC 511.   The patient was placed on ferrous gluconate which, unfortunately, he was unable to tolerate with worsening constipation.  He had further issues in early September with left renal stone, requiring cystoscopy, stent placement 8/23/2022.  9/28/2022 with repeat studies performed 9/21 demonstrating 5% iron saturation, ferritin of 12.5.  He remains further weight and fatigue, again oral iron intolerant and will require IV iron preparations for his iron deficiency anemia.  We discontinued oral iron and proceeded with Injectafer given 9/28/2022 in 10/5/2022  4/17/2023JAK  2-[id number] F mutation having been detected.    In the interval he was admitted 2/19-21/23 for weakness, fall and ER evaluation not demonstrate any acute intracranial process, CT of lumbar spine with lumbar degenerative disease and other studies not show any acute abnormalities.  Fortunately he went on to improve though his wife had a positive COVID test recently on home examination.  4/10/2023 include an H&H of 18.3 and 60.9 white count of 14,700 platelet count of 477,000.  5/15/2023 hemoglobin 15.3, hematocrit 50.4.  Reviewed with Dr. Lopez plans to hold off on phlebotomy and initiate Hydrea 1000 mg daily.  We will tentatively  schedule him for return follow-up visit in 2 weeks with repeat labs and reassessment.  6/1/2020 2:23 weeks of Hydrea 1000 mg daily, WBC 5.0, hemoglobin 15.2, hematocrit 49.8, platelets 113,000.  Hydrea was reduced to 500 mg daily  Stability of counts today, 6/15/2023 on 500 mg daily with WBC 4.81, hemoglobin 14.6, hematocrit 46.7%, platelets 156,000  Patient seen 6/30/2023 with H&H of 14.1 and 46.6 white count of 4800 and platelet count of 1 46,000.  Patient subsequently assessed including 9/21/2023 with H&H gradually dropping 11.1 and 32.9, white count of 5110, platelet count 151,000, .8.  10/20/2023 WBC 8.04, SNC 5.42, Hgb 13.1, Platelets 247,000.  Continue Hydrea 500 mg every other day.  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  I will arrange Monday/Thursday CBC at his nursing center; he may need transfusion support if hemoglobin drops below 8.0.  He appears to have ongoing hemolysis with compensated reticulocytosis.  Begin folic acid 1 mg daily.  If evidence of cold agglutinins once resulted may need steroid therapy.  Will alert his primary hematologist so that he can watch for results.  4.  PT/OT  5.  No opposition to restarting Eliquis/aspirin from hematology perspective given negative CT abdomen and no indication of GI blood loss presently.                        11/29/2023      CC:

## 2023-11-29 NOTE — DISCHARGE PLACEMENT REQUEST
"Papito Santoro (77 y.o. Male)       Date of Birth   1945    Social Security Number       Address   [address] [place] [address]    Home Phone   [phone]    MRN   [id number]       [facility]    Marital Status                               Admission Date   11/22/23    Admission Type   Emergency    Admitting Provider   Tadeo Handy MD    Attending Provider       Department, Room/Bed   [address], N637/1       Discharge Date   11/29/2023    Discharge Disposition   Skilled Nursing Facility (DC - External)    Discharge Destination                                 Attending Provider: (none)   Allergies: Atorvastatin, Penicillins    Isolation: None   Infection: None   Code Status: No CPR    Ht: 172.7 cm (68\")   Wt: 73.9 kg (163 lb)    Admission Cmt: None   Principal Problem: Weakness [R53.1]                   Active Insurance as of 11/22/2023       Primary Coverage       Payor Plan Insurance Group Employer/Plan Group    MEDICARE MEDICARE A & B        Payor Plan Address Payor Plan Phone Number Payor Plan Fax Number Effective Dates    [address] [phone]  12/1/2009 - None Entered    [facility] [id number]         Subscriber Name Subscriber Birth Date Member ID       PAPITO SANTORO 1945 [id number]               Secondary Coverage       Payor Plan Insurance Group Employer/Plan Group    [employer] [id number]       Payor Plan Address Payor Plan Phone Number Payor Plan Fax Number Effective Dates    [address] [phone]  12/1/2009 - None Entered    [facility] [id number]         Subscriber Name Subscriber Birth Date Member ID       PAPITO SANTORO 1945 [id number]                     Emergency Contacts        (Rel.) Home Phone Work Phone Mobile Phone    Brooke Santoro (HCS) (Spouse) [phone] -- [phone]    Priyanka Barnett (Daughter) -- -- [phone]                 Discharge Summary "        Hayden Morales MD at 23 0636              [facility] Medicine Services  DISCHARGE SUMMARY    Patient Name: Madhu Santoro  : 1945  MRN: [id number]    Date of Admission: 2023  9:35 AM  Date of Discharge:  2023  Primary Care Physician: Damian Sanchez MD    Consults       Date and Time Order Name Status Description    2023 12:29 PM Inpatient Infectious Diseases Consult Completed     2023 12:16 PM Inpatient Gastroenterology Consult Completed     2023 12:38 PM Hematology & Oncology Inpatient Consult      11/3/2023  3:24 PM Inpatient General Surgery Consult Completed     2023 10:17 AM Inpatient Cardiology Consult Completed     10/30/2023 10:53 AM Inpatient ENT Consult Completed             Hospital Course       Active Hospital Problems    Diagnosis  POA    **Weakness [R53.1]  Yes    Oral phase dysphagia [R13.11]  Yes    Acute blood loss anemia [D62]  Yes    Anemia [D64.9]  Yes    History of CVA (cerebrovascular accident) [Z86.73]  Not Applicable    S/P percutaneous endoscopic gastrostomy (PEG) tube placement [Z93.1]  Not Applicable    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    Sacral decubitus ulcer [L89.159]  Yes    Polycythemia [D75.1]  Yes    Chronic anticoagulation [Z79.01]  Not Applicable    PAD (peripheral artery disease) [I73.9]  Yes    S/P CABG (coronary artery bypass graft) [Z95.1]  Not Applicable    Typical atrial flutter [I48.3]  Yes    S/P ablation of atrial flutter [Z98.890, Z86.79]  Not Applicable    Former smoker [Z87.891]  Not Applicable    Gastroesophageal reflux disease [K21.9]  Yes    Benign essential hypertension [I10]  Yes    Arthritis [M19.90]  Yes    HLD (hyperlipidemia) [E78.5]  Yes    Anxiety [F41.9]  Yes      Resolved Hospital Problems    Diagnosis Date Resolved POA    Hypoxia [R09.02] 2023 Yes    Elevated troponin [R79.89] 2023 Yes    Leukocytosis [D72.829] 2023 Yes          Hospital Course:  Madhu Santoro is a 77  y.o. male presents to the hospital with weakness and worsening anemia.  He was found to have GI bleed most likely related to recent PEG tube and related to anticoagulation with Eliquis        Anemia, blood loss, and GI bleed:  History of polycythemia vera, currently off hydroxyurea  Hemoccult positive  PPI to twice daily.  Carafate  Gastroenterology following  Possible source could be recent surgical placement of PEG feeding tube with NOAC or other etiology.  Patient and family did not want to pursue endoscopy.  PPI changed to twice daily.  Gastroenterology agreed with this plan and have signed off.  Blood thinners discussed with oncology and gastroenterology ultimately we decided to transition Eliquis to low-dose and hold aspirin for now and possibly discontinue aspirin permanently based on risk versus benefit assessment of all treatment teams.  Once the PEG has been present for a month or so he could speak with his cardiologist about whether or not they absolutely feel that the aspirin needs to be added to the NOAC because this will increase risk of recurrent bleeding.  NOAC initially held, now restarted at low-dose.  He is tolerating well.  No sign of bleeding.     Recent COVID: Resolved, no current respiratory issues     Oral dysphagia:  Currently receiving tube feeds via PEG tube.  Maximize nutrition.  Continue tube feeding as per below.  Recommend intermittent evaluations from speech therapy for dysphagia.     Decubitus ulcer:  Wound images reviewed.  Continue wound care frequent turns, maximize nutrition, and barrier cream.  Superficial wound culture was positive but polymicrobial and likely has substantial contaminant and no obvious sign of deep infection.  Infectious disease evaluated and recommended to hold off antibiotics for now based on risk versus benefit assessment.  Currently the most important thing is to make sure he has barrier cream on at all times and frequent turns as well as keeping the wound  "dry and clean from stool.  Make sure he is changed quickly after stooling to help with wound healing.  Primary care follow-up and if this worsens over the time he could be referred to a wound care clinic.     Polycythemia: Currently off hydroxyurea.  Oncology   Oncology workup initiated.  Oncology has signed off and cleared him for discharge.  [facility] is arranging with SNF to have labs sent to their office.  Please follow instructions as per below.  Per [facility]:   \"Please hold hydrea at d/c and start folic acid daily 1 mg; needs cbc with diff every Monday/Thursday faxed to [phone] while at [facility]\" Damian Daly MD       History of paroxysmal atrial flutter and stroke:  History of ablation.  Chronically anticoagulation currently held due to anemia and GI bleed initially.  Bleeding resolved and he is now tolerating low-dose Eliquis without issue.      Discussion/plan:  Patient appears to be tolerating low-dose Eliquis.   Case discussed with infectious disease based on recent CT scan images.  CT scan of the abdomen does show bibasilar infiltrates.  With aphasia we feel this is more likely some chronic aspiration pneumonitis.  Patient is afebrile without leukocytosis and per my conversation with infectious disease team at this point we think the risks of antibiotics outweigh the benefits and that antibiotics are not needed at this time.   Case management is going to work on possible transfer to SNF today. Case discussed with infectious disease.  It is not felt that patient has active cellulitis or deep tissue injury and we will continue to treat with wound care for decubitus ulcer.  Maximizing nutrition is much as possible also be needed as well as strengthening.       Family would like to avoid aggressive endoscopic evaluation at this time.  Suspect etiology of bleeding is related to recent PEG healing.  PPI has been increased to twice daily.      Case discussed with oncologist today we will hold " aspirin at discharge as per above.     Treatment plan discussed with family who are in agreement.       At the time of discharge take all medications as prescribed, keep all follow-up appointments, and call your doctor or return to the hospital if you have any worsening or concerning symptoms.    Please note that this note was made using Dragon voice recognition software            Day of Discharge     Subjective:  Patient and his daughter are adamant they would like a higher dose of Norco.  They would like him to go back up to the 10 mg but he has been on the 5 mg here taking approximately 3 times daily.  We agreed we would increase him to 7.5 mg and they asked if it could be scheduled 3 times daily.  I said this is reasonable but it will need to be hold if he has any sedation.  We discussed risk versus benefits of narcotics and appropriate plan.  They feel he is stable to transfer today and agree with plan to go to SNF.  Patient also agrees with this plan.  He is eager to try to get stronger.  He is resting by aided by his illness and his recent medical setbacks but says he is motivated to work with therapy.  He agrees to follow-up with primary care provider and with oncology.    Vital Signs:   Temp:  [97.8 °F (36.6 °C)-98.7 °F (37.1 °C)] 97.9 °F (36.6 °C)  Heart Rate:  [88-97] 90  Resp:  [16-18] 16  BP: (128-135)/(62-69) 135/69     Physical Exam:    Constitutional:Awake, alert, somewhat chronically ill-appearing but improved throughout the hospital stay generally  HENT: NCAT, mucous membranes moist, neck supple  Respiratory: No cough or wheezing, nonlabored breathing, moderate inspiration  Cardiovascular: Pulse rate is normal, normal radial pulses  Gastrointestinal: PEG tube, soft, nontender, nondistended  Musculoskeletal: Elderly and somewhat chronically debilitated in appearance, minimal lower extremity edema, BMI is 24  Psychiatric: Appropriate affect, cooperative, conversational  Neurologic: No slurred speech  or facial droop, follows commands  Skin: Pale, decubitus ulcer present in the gluteal region, no rashes or jaundice, warm       Pertinent  and/or Most Recent Results     Results from last 7 days   Lab Units 11/29/23  0639 11/28/23  0656 11/27/23  0646 11/26/23  0702 11/25/23  0654 11/24/23  0612 11/23/23  2039 11/23/23  0815 11/23/23  0615   WBC 10*3/mm3 8.28 9.50 9.77 8.88 8.92 8.98  --   --  9.05   HEMOGLOBIN g/dL 9.1* 9.7* 9.2* 9.3* 8.5* 8.5*  --   --  6.9*   HEMATOCRIT % 29.0* 30.6* 29.1* 28.9* 26.8* 26.9*  --   --  21.8*   PLATELETS 10*3/mm3 343 335 311 366 406 443  --   --  418   SODIUM mmol/L 136 136 136 135* 134* 140  --  136 139   POTASSIUM mmol/L 4.0 4.2 4.0 4.1 4.3 4.2 4.9 3.1* 3.1*   CHLORIDE mmol/L 101 102 103 101 103 106  --  101 102   CO2 mmol/L 27.4 25.0 23.5 26.0 24.8 25.5  --  29.8* 29.7*   BUN mg/dL 20 20 19 17 18 18  --  14 13   CREATININE mg/dL 0.49* 0.52* 0.60* 0.53* 0.59* 0.54*  --  0.58* 0.55*   GLUCOSE mg/dL 110* 114* 126* 92 126* 101*  --  87 84   CALCIUM mg/dL 8.6 8.7 8.4* 8.4* 8.1* 8.2*  --  8.1* 7.9*     Results from last 7 days   Lab Units 11/25/23  1712 11/24/23  0612 11/23/23  0815   BILIRUBIN mg/dL  --  0.8 0.9   ALK PHOS U/L  --  235* 207*   ALT (SGPT) U/L  --  52* 50*   AST (SGOT) U/L  --  62* 51*   PROTIME Seconds 14.6*  --   --    INR  1.13*  --   --    APTT seconds 34.9  --   --            Microbiology Results Abnormal       Procedure Component Value - Date/Time    Blood Culture - Blood, Arm, Left [[id number]]  (Normal) Collected: 11/22/23 1819    Lab Status: Final result Specimen: Blood from Arm, Left Updated: 11/27/23 1900     Blood Culture No growth at 5 days    Blood Culture - Blood, Arm, Right [[id number]]  (Normal) Collected: 11/22/23 1819    Lab Status: Final result Specimen: Blood from Arm, Right Updated: 11/27/23 1900     Blood Culture No growth at 5 days    Respiratory Panel PCR w/COVID-19(SARS-CoV-2) [facility]/LESTER/KANCHAN/PAD/COR/BRIAN In-House, NP Swab in UTM/VTM, 2 HR TAT - Swab,  Nasopharynx [[id number]]  (Normal) Collected: 11/22/23 1437    Lab Status: Final result Specimen: Swab from Nasopharynx Updated: 11/22/23 1602     ADENOVIRUS, PCR Not Detected     Coronavirus 229E Not Detected     Coronavirus HKU1 Not Detected     Coronavirus NL63 Not Detected     Coronavirus OC43 Not Detected     COVID19 Not Detected     Human Metapneumovirus Not Detected     Human Rhinovirus/Enterovirus Not Detected     Influenza A PCR Not Detected     Influenza B PCR Not Detected     Parainfluenza Virus 1 Not Detected     Parainfluenza Virus 2 Not Detected     Parainfluenza Virus 3 Not Detected     Parainfluenza Virus 4 Not Detected     RSV, PCR Not Detected     Bordetella pertussis pcr Not Detected     Bordetella parapertussis PCR Not Detected     Chlamydophila pneumoniae PCR Not Detected     Mycoplasma pneumo by PCR Not Detected    Narrative:      In the setting of a positive respiratory panel with a viral infection PLUS a negative procalcitonin without other underlying concern for bacterial infection, consider observing off antibiotics or discontinuation of antibiotics and continue supportive care. If the respiratory panel is positive for atypical bacterial infection (Bordetella pertussis, Chlamydophila pneumoniae, or Mycoplasma pneumoniae), consider antibiotic de-escalation to target atypical bacterial infection.            Imaging Results (All)       Procedure Component Value Units Date/Time    CT Abdomen Pelvis With Contrast [[id number]] Collected: 11/27/23 1247     Updated: 11/27/23 1254    Narrative:      EXAMINATION: CT OF THE ABDOMEN AND PELVIS WITH CONTRAST     TECHNIQUE: Computed tomography of the abdomen and pelvis after the  uneventful administration of nonionic intravenous contrast per  pancreatic protocol. Radiation dose reduction techniques were utilized,  including automated exposure control and exposure modulation based on  body size.     HISTORY: Pancreatic mass     COMPARISON: 8/22/2022      FINDINGS: Limited evaluation of the inferior thorax demonstrates  emphysematous changes and atelectasis and scarring with superimposed  patchy left greater than right lower lobe opacities, possibly pneumonia.  No pneumothorax is seen. There is bilateral gynecomastia. The heart is  normal in size and configuration, without pericardial effusion. Coronary  calcifications are demonstrated. Median sternotomy changes are seen.     The gallbladder is absent. There is mild intrahepatic biliary ductal  dilation. Low-attenuation renal lesions are technically indeterminate,  likely cysts. There are stable tiny cystic pancreatic lesions, for  example in uncinate process lesion measuring 1.5 x 0.9 cm on series 3,  image 82. No enlarged or suspiciously enhancing lesions are seen.     There is a gastric tube. Colonic diverticula are seen, without evidence  of acute diverticulitis. The gallbladder is absent. A punctate  nonobstructing stone is seen in the right kidney. Low-attenuation renal  lesions are technically indeterminate, likely cysts.        The spleen, adrenal glands, stomach, small bowel, urinary bladder, and  abdominal vasculature are normal. No intraperitoneal fluid collection or  free gas are seen. No enlarged lymph nodes are demonstrated.     Bone windows demonstrate degenerative changes, without suspicious  osseous lesion seen.       Impression:      1. FINDINGS suggestive of bilateral lower lobe pneumonia     2. 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 Consider 1 year follow-up MRCP if clinically.     3. Incidental findings as above.     This report was finalized on 11/27/2023 12:51 PM by Dr. Giorgio Del Rosario M.D on Workstation: [id number]       XR Chest 1 View [[id number]] Collected: 11/22/23 1027     Updated: 11/22/23 1033    Narrative:      XR CHEST 1 VW-     HISTORY: Male who is 77 years-old, infection     TECHNIQUE: Frontal view of the chest     COMPARISON: 11/8/2023      FINDINGS: The heart size is normal. Sternotomy wires are present. Aorta  is calcified. Pulmonary vasculature is unremarkable. Old granulomatous  disease is present. Small likely scarring or atelectasis is seen in both  lungs. No large pleural effusion. No pneumothorax. No acute osseous  process.       Impression:      As described.           This report was finalized on 11/22/2023 10:30 AM by Dr. Mukesh Bruce M.D on Workstation: [id number]               Results for orders placed during the hospital encounter of 11/22/23    Duplex Venous Lower Extremity - Bilateral CAR    Interpretation Summary    Normal bilateral lower extremity venous duplex scan.      Results for orders placed during the hospital encounter of 11/22/23    Duplex Venous Lower Extremity - Bilateral CAR    Interpretation Summary    Normal bilateral lower extremity venous duplex scan.      Results for orders placed during the hospital encounter of 10/23/23    Adult Transthoracic Echo Complete W/ Cont if Necessary Per Protocol    Interpretation Summary    Left ventricular systolic function is normal. Calculated left ventricular EF = 62.8%    Left ventricular wall thickness is consistent with concentric remodelling.    Left ventricular diastolic function was normal.    Normal right ventricular size and function present.      Plan for Follow-up of Pending Labs/Results: Stable pancreas lesion will be followed up in oncology clinic.  Labs below will be followed up in oncology clinic as results are pending  Pending Labs       Order Current Status    Cold Agglutinin Titer In process    PNH Profile In process          Discharge Details        Discharge Medications        New Medications        Instructions Start Date   acetaminophen 325 MG tablet  Commonly known as: TYLENOL   650 mg, Per G Tube, Every 6 Hours PRN      HYDROcodone-acetaminophen 7.5-325 MG per tablet  Commonly known as: Norco  Replaces: HYDROcodone-acetaminophen 5-325 MG per tablet   1  tablet, Oral, 3 Times Daily, Hold if sedation      Menthol-Zinc Oxide 0.44-20.6 % ointment   1 application , Topical, Every 12 Hours Scheduled      sennosides-docusate 8.6-50 MG per tablet  Commonly known as: PERICOLACE   2 tablets, Per G Tube, 2 Times Daily PRN             Changes to Medications        Instructions Start Date   amitriptyline 50 MG tablet  Commonly known as: ELAVIL  What changed: how to take this   50 mg, Per G Tube, Nightly      amLODIPine 5 MG tablet  Commonly known as: NORVASC  What changed: how to take this   5 mg, Per G Tube, Daily      apixaban 2.5 MG tablet tablet  Commonly known as: ELIQUIS  What changed:   medication strength  how much to take  when to take this   2.5 mg, Oral, Every 12 Hours Scheduled      lansoprazole 30 MG Tablet Delayed Release Dispersible disintegrating tablet  Commonly known as: PREVACID SOLUTAB  What changed:   how to take this  when to take this   30 mg, Per G Tube, 2 Times Daily Before Meals      rosuvastatin 20 MG tablet  Commonly known as: Crestor  What changed:   how to take this  when to take this   20 mg, Per G Tube, Nightly      sucralfate 1 g tablet  Commonly known as: CARAFATE  What changed: how to take this   1 g, Per G Tube, 2 Times Daily Before Meals             Continue These Medications        Instructions Start Date   guaifenesin 100 MG/5ML liquid  Commonly known as: ROBITUSSIN   200 mg, Oral, Every 4 Hours PRN      ipratropium-albuterol 0.5-2.5 mg/3 ml nebulizer  Commonly known as: DUO-NEB   3 mL, Nebulization, Every 4 Hours PRN             Stop These Medications      aluminum-magnesium hydroxide-simethicone 200-200-20 MG/5ML suspension  Commonly known as: MAALOX/MYLANTA     furosemide 20 MG tablet  Commonly known as: LASIX     HYDROcodone-acetaminophen 5-325 MG per tablet  Commonly known as: NORCO  Replaced by: HYDROcodone-acetaminophen 7.5-325 MG per tablet     mupirocin 2 % cream  Commonly known as: BACTROBAN     naloxone 4 MG/0.1ML nasal  spray  Commonly known as: NARCAN     oxyBUTYnin 5 MG/5ML solution oral solution  Commonly known as: DITROPAN     pantoprazole 40 MG EC tablet  Commonly known as: PROTONIX              Allergies   Allergen Reactions    Atorvastatin Myalgia     Myalgia    Penicillins Hives, Swelling and Rash     Tolerates cephalosporins          Discharge Disposition:  Skilled Nursing Facility (DC - External)    Diet:  Hospital:  Diet Order   Procedures    NPO Diet NPO Type: Strict NPO       Activity:  Activity Instructions       Activity as Tolerated                   CODE STATUS:    Code Status and Medical Interventions:   Ordered at: 11/22/23 1814     Medical Intervention Limits:    NO intubation (DNI)     Level Of Support Discussed With:    Patient     Code Status (Patient has no pulse and is not breathing):    No CPR (Do Not Attempt to Resuscitate)     Medical Interventions (Patient has pulse or is breathing):    Limited Support       Future Appointments   Date Time Provider Department Center   12/8/2023  9:30 AM [facility] CT 1 BH [facility] CT BR None   1/8/2024  3:30 PM LAB CHAIR 3 CBC [facility] LAB [facility]   1/8/2024  3:40 PM SPECIALTY PHARMACY CBC [facility] BH INFUS [facility]   1/8/2024  4:20 PM Aquiles Lopez MD [facility] CBC [facility]   1/16/2024 11:20 AM Massimo Jordan MD [facility] CD LCGJT [facility]   2/12/2024  2:00 PM Nisa Mittal APRN [facility] N [facility]       Additional Instructions for the Follow-ups that You Need to Schedule       Discharge Follow-up with PCP   As directed       Currently Documented PCP:    Damian Sanchez MD    PCP Phone Number:    [phone]     Follow Up Details: 1 week after discharge from SNF        Discharge Follow-up with Specified Provider: [facility] for anemia follow up, call with questions; 3 Months   As directed      To: [facility] for anemia follow up, call with questions   Follow Up: 3 Months                Additional Instructions for the  Follow-ups that You Need to Schedule       Discharge Follow-up with PCP   As directed       Currently Documented PCP:    Damian Sanchez MD    PCP Phone Number:    [phone]     Follow Up Details: 1 week after discharge from SNF        Discharge Follow-up with Specified Provider: [facility] for anemia follow up, call with questions; 3 Months   As directed      To: [facility] for anemia follow up, call with questions   Follow Up: 3 Months               Contact information for follow-up providers       Damian Sanchez MD .    Specialty: Family Medicine  Why: 1 week after discharge from [facility]  Contact information:  [address] [facility] [address]  [facility] [address]  [phone]                       Contact information for after-discharge care       Destination       [facility] .    Service: Skilled Nursing  Contact information:  [address] [facility] [address] [phone]                                       Hayden Morales MD  11/29/23      Time Spent on Discharge:  I spent greater than 40 minutes on this discharge activity which included: face-to-face encounter with the patient, reviewing the data in the system, coordination of the care with the nursing staff as well as consultants, documentation, and entering orders.        Electronically signed by Hayden Morales MD at 11/29/23 7810

## 2023-11-30 NOTE — PROGRESS NOTES
Call placed to [facility] where pt currently resides. Wanted to give them verbal order for CBC twice weekly with instructions to fax to our office. Was on hold for over 15 min and no one came to the line. Orders faxed to [facility] at [phone]. Confirmation received

## 2023-12-01 LAB — CD59 CELLS BLD QL: NORMAL

## 2023-12-01 NOTE — PROGRESS NOTES
Called and s/w nurse at [facility]. Advised him that pt needs CBC's twice weekly and that RN faxed the orders yesterday. He states they did not receive and requested RN fax to [phone]. Gave verbal order for the CBC and advised nursing home to fax to our office once they have completed. He v/u. Also faxed order to new fax number. Confirmation received.

## 2023-12-05 NOTE — PROGRESS NOTES
Called and s/w Marylou at [facility] [place]. Asked if a CBC was drawn on pt yesterday and if it could be faxed to our office if so. She said a CBC was drawn this morning, not sure of when it will result but when it does, she will fax it to our office. They are aware of twice weekly CBC's that need to be drawn on this pt.

## 2023-12-11 ENCOUNTER — TELEPHONE (OUTPATIENT)
Dept: FAMILY MEDICINE CLINIC | Facility: CLINIC | Age: 78
End: 2023-12-11

## 2023-12-11 NOTE — TELEPHONE ENCOUNTER
PATIENTS SON STATES THIS PATIENT HAS BEEN IN THE HOSPITAL FOR 7 WEEKS AND WOULD LIKE TO SPEAK WITH DR. CHAPPELL ABOUT THE PATIENT'S MEDICATIONS ETC. I INFORMED SON THAT HE IS NOT ON THE HIPAA RELEASE FORMS BUT I WOULD INFORM THE DRAlondra OF THE SITUATION.

## 2023-12-12 ENCOUNTER — TELEPHONE (OUTPATIENT)
Dept: FAMILY MEDICINE CLINIC | Facility: CLINIC | Age: 78
End: 2023-12-12
Payer: MEDICARE

## 2023-12-19 ENCOUNTER — TELEPHONE (OUTPATIENT)
Dept: ONCOLOGY | Facility: CLINIC | Age: 78
End: 2023-12-19
Payer: MEDICARE

## 2023-12-19 NOTE — TELEPHONE ENCOUNTER
Called and s/w the nurse manager at [facility] [place] who found the standing order for CBCs and said she would send CBC's twice a week to our fax number. She is not sure why some of them have been missed.

## 2024-01-03 ENCOUNTER — TELEPHONE (OUTPATIENT)
Dept: FAMILY MEDICINE CLINIC | Facility: CLINIC | Age: 79
End: 2024-01-03
Payer: MEDICARE

## 2024-01-03 NOTE — TELEPHONE ENCOUNTER
[place] [facility]  Needing v/o for NSG- pt has PEG Tube in place, PT/OT/ and ST

## 2024-01-04 NOTE — TELEPHONE ENCOUNTER
LDTVM advising that PCP agreed to verbal orders as requested. Instructed to call the office with further concerns/orders.

## 2024-01-05 ENCOUNTER — TRANSCRIBE ORDERS (OUTPATIENT)
Dept: HOME HEALTH SERVICES | Facility: HOME HEALTHCARE | Age: 79
End: 2024-01-05
Payer: MEDICARE

## 2024-01-05 ENCOUNTER — DOCUMENTATION (OUTPATIENT)
Dept: HOME HEALTH SERVICES | Facility: HOME HEALTHCARE | Age: 79
End: 2024-01-05
Payer: MEDICARE

## 2024-01-05 ENCOUNTER — TELEPHONE (OUTPATIENT)
Dept: FAMILY MEDICINE CLINIC | Facility: CLINIC | Age: 79
End: 2024-01-05
Payer: MEDICARE

## 2024-01-05 ENCOUNTER — TELEPHONE (OUTPATIENT)
Dept: ONCOLOGY | Facility: CLINIC | Age: 79
End: 2024-01-05
Payer: MEDICARE

## 2024-01-05 ENCOUNTER — HOME HEALTH ADMISSION (OUTPATIENT)
Dept: HOME HEALTH SERVICES | Facility: HOME HEALTHCARE | Age: 79
End: 2024-01-05
Payer: MEDICARE

## 2024-01-05 DIAGNOSIS — D45 POLYCYTHEMIA VERA: Primary | ICD-10-CM

## 2024-01-05 DIAGNOSIS — Z43.1 ATTENTION TO GASTROSTOMY: Primary | ICD-10-CM

## 2024-01-05 NOTE — TELEPHONE ENCOUNTER
He has continued to improve.  I would move to every 2 weeks and appointment in 8 weeks.  Thanks,  MDK     Called Eryn to let her know the verbal lab order per Dr. Lopez. Scheduling sent a message about getting patient schedule din 8 weeks.

## 2024-01-05 NOTE — TELEPHONE ENCOUNTER
Please Follow If Calling for Orders  Caller: Eryn  Department/Office:[facility]   Best call back number:[phone]  Fax Number:   What orders are you requesting (i.e. lab or imaging): Pt is going to be getting [facility] .Asking if labs can be ordered for home?  In what timeframe would you need the order: Today

## 2024-01-05 NOTE — TELEPHONE ENCOUNTER
Sharon Guadalupe from [facility] called regarding this pt, ask that Tanesha would give her a call back at [phone]      Please adise

## 2024-01-05 NOTE — PROGRESS NOTES
Spoke with patient at facility, he requested that I speak with spouse regarding home care, patient agreeable to home care.  Spoke with spouse, she is also agreeable to home care, verified address and phone, explained home health services and answered questions regarding home care, spouse states patient is not receiving home care services from another agency.

## 2024-01-07 ENCOUNTER — APPOINTMENT (OUTPATIENT)
Dept: GENERAL RADIOLOGY | Facility: HOSPITAL | Age: 79
DRG: 312 | End: 2024-01-07
Payer: MEDICARE

## 2024-01-07 ENCOUNTER — HOME CARE VISIT (OUTPATIENT)
Dept: HOME HEALTH SERVICES | Facility: HOME HEALTHCARE | Age: 79
End: 2024-01-07

## 2024-01-07 ENCOUNTER — HOSPITAL ENCOUNTER (INPATIENT)
Facility: HOSPITAL | Age: 79
LOS: 3 days | Discharge: NURSING, REHAB OR LONG-TERM CARE | DRG: 312 | End: 2024-01-12
Attending: EMERGENCY MEDICINE | Admitting: INTERNAL MEDICINE
Payer: MEDICARE

## 2024-01-07 ENCOUNTER — DOCUMENTATION (OUTPATIENT)
Dept: HOME HEALTH SERVICES | Facility: HOME HEALTHCARE | Age: 79
End: 2024-01-07
Payer: MEDICARE

## 2024-01-07 DIAGNOSIS — D72.825 BANDEMIA: ICD-10-CM

## 2024-01-07 DIAGNOSIS — R53.1 GENERALIZED WEAKNESS: Primary | ICD-10-CM

## 2024-01-07 DIAGNOSIS — M51.37 DDD (DEGENERATIVE DISC DISEASE), LUMBOSACRAL: ICD-10-CM

## 2024-01-07 DIAGNOSIS — G62.9 NEUROPATHY: ICD-10-CM

## 2024-01-07 DIAGNOSIS — M19.90 ARTHRITIS: ICD-10-CM

## 2024-01-07 DIAGNOSIS — R26.2 UNABLE TO AMBULATE: ICD-10-CM

## 2024-01-07 DIAGNOSIS — I95.1 ORTHOSTATIC HYPOTENSION: ICD-10-CM

## 2024-01-07 PROBLEM — R42 ORTHOSTATIC DIZZINESS: Status: ACTIVE | Noted: 2024-01-07

## 2024-01-07 LAB
ABO GROUP BLD: NORMAL
ALBUMIN SERPL-MCNC: 3.8 G/DL (ref 3.5–5.2)
ALBUMIN/GLOB SERPL: 1.2 G/DL
ALP SERPL-CCNC: 153 U/L (ref 39–117)
ALT SERPL W P-5'-P-CCNC: 17 U/L (ref 1–41)
ANION GAP SERPL CALCULATED.3IONS-SCNC: 11 MMOL/L (ref 5–15)
APTT PPP: 42.5 SECONDS (ref 22.7–35.4)
AST SERPL-CCNC: 39 U/L (ref 1–40)
B PARAPERT DNA SPEC QL NAA+PROBE: NOT DETECTED
B PERT DNA SPEC QL NAA+PROBE: NOT DETECTED
BASOPHILS # BLD AUTO: 0.05 10*3/MM3 (ref 0–0.2)
BASOPHILS NFR BLD AUTO: 0.4 % (ref 0–1.5)
BILIRUB SERPL-MCNC: 0.3 MG/DL (ref 0–1.2)
BILIRUB UR QL STRIP: NEGATIVE
BLD GP AB SCN SERPL QL: NEGATIVE
BUN SERPL-MCNC: 11 MG/DL (ref 8–23)
BUN/CREAT SERPL: 16.7 (ref 7–25)
C PNEUM DNA NPH QL NAA+NON-PROBE: NOT DETECTED
CALCIUM SPEC-SCNC: 9.7 MG/DL (ref 8.6–10.5)
CHLORIDE SERPL-SCNC: 101 MMOL/L (ref 98–107)
CLARITY UR: ABNORMAL
CO2 SERPL-SCNC: 27 MMOL/L (ref 22–29)
COLOR UR: YELLOW
CREAT SERPL-MCNC: 0.66 MG/DL (ref 0.76–1.27)
D-LACTATE SERPL-SCNC: 0.9 MMOL/L (ref 0.5–2)
DEPRECATED RDW RBC AUTO: 55.3 FL (ref 37–54)
EGFRCR SERPLBLD CKD-EPI 2021: 95.4 ML/MIN/1.73
EOSINOPHIL # BLD AUTO: 0.27 10*3/MM3 (ref 0–0.4)
EOSINOPHIL NFR BLD AUTO: 2 % (ref 0.3–6.2)
ERYTHROCYTE [DISTWIDTH] IN BLOOD BY AUTOMATED COUNT: 15.9 % (ref 12.3–15.4)
FLUAV SUBTYP SPEC NAA+PROBE: NOT DETECTED
FLUAV SUBTYP SPEC NAA+PROBE: NOT DETECTED
FLUBV RNA ISLT QL NAA+PROBE: NOT DETECTED
FLUBV RNA ISLT QL NAA+PROBE: NOT DETECTED
GEN 5 2HR TROPONIN T REFLEX: 35 NG/L
GLOBULIN UR ELPH-MCNC: 3.3 GM/DL
GLUCOSE BLDC GLUCOMTR-MCNC: 87 MG/DL (ref 70–130)
GLUCOSE BLDC GLUCOMTR-MCNC: 95 MG/DL (ref 70–130)
GLUCOSE SERPL-MCNC: 164 MG/DL (ref 65–99)
GLUCOSE UR STRIP-MCNC: NEGATIVE MG/DL
HADV DNA SPEC NAA+PROBE: NOT DETECTED
HCOV 229E RNA SPEC QL NAA+PROBE: NOT DETECTED
HCOV HKU1 RNA SPEC QL NAA+PROBE: NOT DETECTED
HCOV NL63 RNA SPEC QL NAA+PROBE: NOT DETECTED
HCOV OC43 RNA SPEC QL NAA+PROBE: NOT DETECTED
HCT VFR BLD AUTO: 40.5 % (ref 37.5–51)
HGB BLD-MCNC: 12.7 G/DL (ref 13–17.7)
HGB UR QL STRIP.AUTO: NEGATIVE
HMPV RNA NPH QL NAA+NON-PROBE: NOT DETECTED
HPIV1 RNA ISLT QL NAA+PROBE: NOT DETECTED
HPIV2 RNA SPEC QL NAA+PROBE: NOT DETECTED
HPIV3 RNA NPH QL NAA+PROBE: NOT DETECTED
HPIV4 P GENE NPH QL NAA+PROBE: NOT DETECTED
IMM GRANULOCYTES # BLD AUTO: 0.41 10*3/MM3 (ref 0–0.05)
IMM GRANULOCYTES NFR BLD AUTO: 3 % (ref 0–0.5)
INR PPP: 1.34 (ref 0.9–1.1)
KETONES UR QL STRIP: NEGATIVE
LEUKOCYTE ESTERASE UR QL STRIP.AUTO: NEGATIVE
LYMPHOCYTES # BLD AUTO: 1.17 10*3/MM3 (ref 0.7–3.1)
LYMPHOCYTES NFR BLD AUTO: 8.7 % (ref 19.6–45.3)
M PNEUMO IGG SER IA-ACNC: NOT DETECTED
MAGNESIUM SERPL-MCNC: 2 MG/DL (ref 1.6–2.4)
MCH RBC QN AUTO: 29.7 PG (ref 26.6–33)
MCHC RBC AUTO-ENTMCNC: 31.4 G/DL (ref 31.5–35.7)
MCV RBC AUTO: 94.6 FL (ref 79–97)
MONOCYTES # BLD AUTO: 1.85 10*3/MM3 (ref 0.1–0.9)
MONOCYTES NFR BLD AUTO: 13.7 % (ref 5–12)
NEUTROPHILS NFR BLD AUTO: 72.2 % (ref 42.7–76)
NEUTROPHILS NFR BLD AUTO: 9.77 10*3/MM3 (ref 1.7–7)
NITRITE UR QL STRIP: NEGATIVE
NRBC BLD AUTO-RTO: 0 /100 WBC (ref 0–0.2)
NT-PROBNP SERPL-MCNC: 149 PG/ML (ref 0–1800)
PH UR STRIP.AUTO: 7.5 [PH] (ref 5–8)
PLATELET # BLD AUTO: 498 10*3/MM3 (ref 140–450)
PMV BLD AUTO: 10.5 FL (ref 6–12)
POTASSIUM SERPL-SCNC: 4.3 MMOL/L (ref 3.5–5.2)
PROCALCITONIN SERPL-MCNC: 0.13 NG/ML (ref 0–0.25)
PROT SERPL-MCNC: 7.1 G/DL (ref 6–8.5)
PROT UR QL STRIP: NEGATIVE
PROTHROMBIN TIME: 16.8 SECONDS (ref 11.7–14.2)
QT INTERVAL: 355 MS
QTC INTERVAL: 442 MS
RBC # BLD AUTO: 4.28 10*6/MM3 (ref 4.14–5.8)
RH BLD: NEGATIVE
RHINOVIRUS RNA SPEC NAA+PROBE: NOT DETECTED
RSV RNA NPH QL NAA+NON-PROBE: NOT DETECTED
SARS-COV-2 RNA NPH QL NAA+NON-PROBE: NOT DETECTED
SARS-COV-2 RNA RESP QL NAA+PROBE: NOT DETECTED
SODIUM SERPL-SCNC: 139 MMOL/L (ref 136–145)
SP GR UR STRIP: 1.01 (ref 1–1.03)
T&S EXPIRATION DATE: NORMAL
TROPONIN T DELTA: -3 NG/L
TROPONIN T SERPL HS-MCNC: 38 NG/L
UROBILINOGEN UR QL STRIP: ABNORMAL
WBC NRBC COR # BLD AUTO: 13.52 10*3/MM3 (ref 3.4–10.8)

## 2024-01-07 PROCEDURE — P9612 CATHETERIZE FOR URINE SPEC: HCPCS

## 2024-01-07 PROCEDURE — 87040 BLOOD CULTURE FOR BACTERIA: CPT | Performed by: EMERGENCY MEDICINE

## 2024-01-07 PROCEDURE — 84484 ASSAY OF TROPONIN QUANT: CPT | Performed by: PHYSICIAN ASSISTANT

## 2024-01-07 PROCEDURE — 99285 EMERGENCY DEPT VISIT HI MDM: CPT

## 2024-01-07 PROCEDURE — 83735 ASSAY OF MAGNESIUM: CPT | Performed by: PHYSICIAN ASSISTANT

## 2024-01-07 PROCEDURE — 82948 REAGENT STRIP/BLOOD GLUCOSE: CPT

## 2024-01-07 PROCEDURE — 93005 ELECTROCARDIOGRAM TRACING: CPT | Performed by: PHYSICIAN ASSISTANT

## 2024-01-07 PROCEDURE — 86901 BLOOD TYPING SEROLOGIC RH(D): CPT | Performed by: PHYSICIAN ASSISTANT

## 2024-01-07 PROCEDURE — 83605 ASSAY OF LACTIC ACID: CPT | Performed by: EMERGENCY MEDICINE

## 2024-01-07 PROCEDURE — 36415 COLL VENOUS BLD VENIPUNCTURE: CPT

## 2024-01-07 PROCEDURE — 86900 BLOOD TYPING SEROLOGIC ABO: CPT | Performed by: PHYSICIAN ASSISTANT

## 2024-01-07 PROCEDURE — 83880 ASSAY OF NATRIURETIC PEPTIDE: CPT | Performed by: PHYSICIAN ASSISTANT

## 2024-01-07 PROCEDURE — 85730 THROMBOPLASTIN TIME PARTIAL: CPT | Performed by: PHYSICIAN ASSISTANT

## 2024-01-07 PROCEDURE — 87636 SARSCOV2 & INF A&B AMP PRB: CPT | Performed by: EMERGENCY MEDICINE

## 2024-01-07 PROCEDURE — G0378 HOSPITAL OBSERVATION PER HR: HCPCS

## 2024-01-07 PROCEDURE — 85025 COMPLETE CBC W/AUTO DIFF WBC: CPT | Performed by: PHYSICIAN ASSISTANT

## 2024-01-07 PROCEDURE — 81003 URINALYSIS AUTO W/O SCOPE: CPT | Performed by: PHYSICIAN ASSISTANT

## 2024-01-07 PROCEDURE — 93010 ELECTROCARDIOGRAM REPORT: CPT | Performed by: INTERNAL MEDICINE

## 2024-01-07 PROCEDURE — 25810000003 SODIUM CHLORIDE 0.9 % SOLUTION: Performed by: EMERGENCY MEDICINE

## 2024-01-07 PROCEDURE — 84145 PROCALCITONIN (PCT): CPT | Performed by: EMERGENCY MEDICINE

## 2024-01-07 PROCEDURE — 85610 PROTHROMBIN TIME: CPT | Performed by: PHYSICIAN ASSISTANT

## 2024-01-07 PROCEDURE — 86850 RBC ANTIBODY SCREEN: CPT | Performed by: PHYSICIAN ASSISTANT

## 2024-01-07 PROCEDURE — 25810000003 SODIUM CHLORIDE 0.9 % SOLUTION: Performed by: INTERNAL MEDICINE

## 2024-01-07 PROCEDURE — 71045 X-RAY EXAM CHEST 1 VIEW: CPT

## 2024-01-07 PROCEDURE — 80053 COMPREHEN METABOLIC PANEL: CPT | Performed by: PHYSICIAN ASSISTANT

## 2024-01-07 PROCEDURE — 0202U NFCT DS 22 TRGT SARS-COV-2: CPT | Performed by: INTERNAL MEDICINE

## 2024-01-07 RX ORDER — SODIUM CHLORIDE 9 MG/ML
100 INJECTION, SOLUTION INTRAVENOUS CONTINUOUS
Status: DISCONTINUED | OUTPATIENT
Start: 2024-01-07 | End: 2024-01-08

## 2024-01-07 RX ORDER — AMLODIPINE BESYLATE 5 MG/1
5 TABLET ORAL DAILY
Status: DISCONTINUED | OUTPATIENT
Start: 2024-01-07 | End: 2024-01-12 | Stop reason: HOSPADM

## 2024-01-07 RX ORDER — SODIUM CHLORIDE 0.9 % (FLUSH) 0.9 %
10 SYRINGE (ML) INJECTION AS NEEDED
Status: DISCONTINUED | OUTPATIENT
Start: 2024-01-07 | End: 2024-01-12 | Stop reason: HOSPADM

## 2024-01-07 RX ORDER — ACETAMINOPHEN 325 MG/1
650 TABLET ORAL EVERY 4 HOURS PRN
Status: DISCONTINUED | OUTPATIENT
Start: 2024-01-07 | End: 2024-01-12 | Stop reason: HOSPADM

## 2024-01-07 RX ORDER — ACETAMINOPHEN 325 MG/1
650 TABLET ORAL EVERY 6 HOURS PRN
Status: DISCONTINUED | OUTPATIENT
Start: 2024-01-07 | End: 2024-01-12 | Stop reason: HOSPADM

## 2024-01-07 RX ORDER — AMOXICILLIN 250 MG
2 CAPSULE ORAL 2 TIMES DAILY PRN
Status: DISCONTINUED | OUTPATIENT
Start: 2024-01-07 | End: 2024-01-12 | Stop reason: HOSPADM

## 2024-01-07 RX ORDER — ACETAMINOPHEN 160 MG/5ML
650 SOLUTION ORAL EVERY 4 HOURS PRN
Status: DISCONTINUED | OUTPATIENT
Start: 2024-01-07 | End: 2024-01-12 | Stop reason: HOSPADM

## 2024-01-07 RX ORDER — ONDANSETRON 2 MG/ML
4 INJECTION INTRAMUSCULAR; INTRAVENOUS EVERY 6 HOURS PRN
Status: DISCONTINUED | OUTPATIENT
Start: 2024-01-07 | End: 2024-01-12 | Stop reason: HOSPADM

## 2024-01-07 RX ORDER — SODIUM CHLORIDE 0.9 % (FLUSH) 0.9 %
10 SYRINGE (ML) INJECTION EVERY 12 HOURS SCHEDULED
Status: DISCONTINUED | OUTPATIENT
Start: 2024-01-07 | End: 2024-01-12 | Stop reason: HOSPADM

## 2024-01-07 RX ORDER — CYANOCOBALAMIN 1000 UG/ML
1000 INJECTION, SOLUTION INTRAMUSCULAR; SUBCUTANEOUS
Status: DISCONTINUED | OUTPATIENT
Start: 2024-01-31 | End: 2024-01-12 | Stop reason: HOSPADM

## 2024-01-07 RX ORDER — IPRATROPIUM BROMIDE AND ALBUTEROL SULFATE 2.5; .5 MG/3ML; MG/3ML
3 SOLUTION RESPIRATORY (INHALATION) EVERY 4 HOURS PRN
Status: DISCONTINUED | OUTPATIENT
Start: 2024-01-07 | End: 2024-01-12 | Stop reason: HOSPADM

## 2024-01-07 RX ORDER — HYDROCODONE BITARTRATE AND ACETAMINOPHEN 7.5; 325 MG/1; MG/1
1 TABLET ORAL 3 TIMES DAILY
Status: DISCONTINUED | OUTPATIENT
Start: 2024-01-07 | End: 2024-01-08

## 2024-01-07 RX ORDER — ALLOPURINOL 300 MG/1
300 TABLET ORAL DAILY
COMMUNITY

## 2024-01-07 RX ORDER — AMITRIPTYLINE HYDROCHLORIDE 50 MG/1
50 TABLET, FILM COATED ORAL NIGHTLY
Status: DISCONTINUED | OUTPATIENT
Start: 2024-01-07 | End: 2024-01-12 | Stop reason: HOSPADM

## 2024-01-07 RX ORDER — GUAIFENESIN 200 MG/10ML
200 LIQUID ORAL EVERY 4 HOURS PRN
Status: DISCONTINUED | OUTPATIENT
Start: 2024-01-07 | End: 2024-01-12 | Stop reason: HOSPADM

## 2024-01-07 RX ORDER — NITROGLYCERIN 0.4 MG/1
0.4 TABLET SUBLINGUAL
Status: DISCONTINUED | OUTPATIENT
Start: 2024-01-07 | End: 2024-01-12 | Stop reason: HOSPADM

## 2024-01-07 RX ORDER — SODIUM CHLORIDE 9 MG/ML
40 INJECTION, SOLUTION INTRAVENOUS AS NEEDED
Status: DISCONTINUED | OUTPATIENT
Start: 2024-01-07 | End: 2024-01-12 | Stop reason: HOSPADM

## 2024-01-07 RX ORDER — ROSUVASTATIN CALCIUM 20 MG/1
20 TABLET, COATED ORAL NIGHTLY
Status: DISCONTINUED | OUTPATIENT
Start: 2024-01-07 | End: 2024-01-12 | Stop reason: HOSPADM

## 2024-01-07 RX ORDER — ACETAMINOPHEN 650 MG/1
650 SUPPOSITORY RECTAL EVERY 4 HOURS PRN
Status: DISCONTINUED | OUTPATIENT
Start: 2024-01-07 | End: 2024-01-12 | Stop reason: HOSPADM

## 2024-01-07 RX ORDER — HYDROCODONE BITARTRATE AND ACETAMINOPHEN 7.5; 325 MG/1; MG/1
1 TABLET ORAL ONCE
Status: COMPLETED | OUTPATIENT
Start: 2024-01-07 | End: 2024-01-07

## 2024-01-07 RX ORDER — SUCRALFATE 1 G/1
1 TABLET ORAL
Status: DISCONTINUED | OUTPATIENT
Start: 2024-01-07 | End: 2024-01-12 | Stop reason: HOSPADM

## 2024-01-07 RX ADMIN — AMITRIPTYLINE HYDROCHLORIDE 50 MG: 50 TABLET, FILM COATED ORAL at 20:30

## 2024-01-07 RX ADMIN — SODIUM CHLORIDE 100 ML/HR: 9 INJECTION, SOLUTION INTRAVENOUS at 19:02

## 2024-01-07 RX ADMIN — Medication 10 ML: at 20:31

## 2024-01-07 RX ADMIN — APIXABAN 2.5 MG: 2.5 TABLET, FILM COATED ORAL at 20:34

## 2024-01-07 RX ADMIN — HYDROCODONE BITARTRATE AND ACETAMINOPHEN 1 TABLET: 7.5; 325 TABLET ORAL at 20:34

## 2024-01-07 RX ADMIN — ROSUVASTATIN CALCIUM 20 MG: 20 TABLET, FILM COATED ORAL at 20:30

## 2024-01-07 RX ADMIN — SODIUM CHLORIDE 500 ML: 9 INJECTION, SOLUTION INTRAVENOUS at 14:10

## 2024-01-07 RX ADMIN — HYDROCODONE BITARTRATE AND ACETAMINOPHEN 1 TABLET: 7.5; 325 TABLET ORAL at 15:10

## 2024-01-07 RX ADMIN — SUCRALFATE 1 G: 1 TABLET ORAL at 20:30

## 2024-01-07 RX ADMIN — ANORECTAL OINTMENT 1 APPLICATION: 15.7; .44; 24; 20.6 OINTMENT TOPICAL at 20:30

## 2024-01-07 RX ADMIN — LANSOPRAZOLE 30 MG: 15 TABLET, ORALLY DISINTEGRATING ORAL at 19:02

## 2024-01-07 NOTE — CASE MANAGEMENT/SOCIAL WORK
Discharge Planning Assessment  [facility]     Patient Name: Madhu Santoro  MRN: [id number]  Today's Date: 1/7/2024    Admit Date: 1/7/2024    Plan: Would like to return home w/ family and HH at d/c if possible   Discharge Needs Assessment       Row Name 01/07/24 1617       Discharge Needs Assessment    Equipment Currently Used at Home --  raised toilet seat, bedrail      Row Name 01/07/24 1606       Living Environment    People in Home spouse    Current Living Arrangements home    Potentially Unsafe Housing Conditions none    Primary Care Provided by spouse/significant other;child(chinmay)    Provides Primary Care For no one, unable/limited ability to care for self    Family Caregiver if Needed child(chinmay), adult;spouse    Family Caregiver Names wife Brooke; daughter Jennifer; and son    Quality of Family Relationships supportive    Able to Return to Prior Arrangements --  TBD       Resource/Environmental Concerns    Resource/Environmental Concerns none    Transportation Concerns none       Transition Planning    Patient/Family Anticipates Transition to other (see comments)  TBD; would like to return home w/ family and Home Health (current w/ [facility]) at d/c    Patient/Family Anticipated Services at Transition home health care    Transportation Anticipated family or friend will provide       Discharge Needs Assessment    Equipment Currently Used at Home bath bench;grab bar;bp cuff;walker, standard;wheelchair;other (see comments)  tube feeding supplies; tube feeds 6X/day-unsure of formula or amount    Concerns to be Addressed discharge planning    Equipment Needed After Discharge other (see comments)  interested in external catheter at home    Provided Post Acute Provider List? N/A    Provided Post Acute Provider Quality & Resource List? N/A                   Discharge Plan       Row Name 01/07/24 1610       Plan    Plan Would like to return home w/ family and HH at d/c if possible    Provided Post Acute Provider  List? N/A    Provided Post Acute Provider Quality & Resource List? N/A    Plan Comments Spoke w/ pt and daughter Jennifer at bedside w/ patient's permission. Introduced self and explained role. Dischraged from [facility] on 1/5/24 and went home w/ wife, w/ son and daughter assisting. States he was set up w/ [facility], ST, PT and OT and the nurse visited once. Uses W/C, reg walker, bed rail, raised toilet seat, B/P cuff, bath bench and grab bars at home. Receives tube feeds per bolus 6X/day-unsure of formula or amount. Lives in house w/ wife and has one step to enter and two steps inside home. States he has become weaker since being home. Would like to return home w/ family's assist and HH at d/c if possible. Expressed interest in external catheter at home. CM to follow-ANTHONY Cruz RN                  Continued Care and Services - Admitted Since 1/7/2024    Coordination has not been started for this encounter.       Selected Continued Care - Episodes Includes continued care and service providers with selected services from the active episodes listed below      Oncology- External Fill Episode start date: 5/16/2023 (Paused)   There are no active outsourced providers for this episode.                 Selected Continued Care - Prior Encounters Includes continued care and service providers with selected services from prior encounters from 10/9/2023 to 1/7/2024      Discharged on 11/29/2023 Admission date: 11/22/2023 - Discharge disposition: Skilled Nursing Facility (DC - External)      Destination       Service Provider Selected Services Address Phone Fax Patient Preferred    [facility] [address] [address] [phone] --                      Discharged on 11/9/2023 Admission date: 10/23/2023 - Discharge disposition: Skilled Nursing Facility (DC - External)      Destination       Service Provider Selected Services Address Phone Fax Patient  Preferred    [facility] [address] [address] [phone] --                             Demographic Summary       Row Name 01/07/24 1605       General Information    Admission Type observation    Arrived From emergency department    Required Notices Provided Observation Status Notice    Referral Source admission list;physician    Reason for Consult discharge planning    Preferred Language English       Contact Information    Permission Granted to Share Info With ;family/designee                   Functional Status       Row Name 01/07/24 1605       Functional Status    Usual Activity Tolerance moderate    Current Activity Tolerance poor       Functional Status, IADL    IADL Comments wife assists w/ all tasks listed above       Mental Status Summary    Recent Changes in Mental Status/Cognitive Functioning no changes                   Psychosocial       Row Name 01/07/24 1606       Behavior WDL    Behavior WDL WDL       Emotion Mood WDL    Emotion/Mood/Affect WDL WDL       Speech WDL    Speech WDL WDL       Perceptual State WDL    Perceptual State WDL WDL       Thought Process WDL    Thought Process WDL WDL       Intellectual Performance WDL    Intellectual Performance WDL WDL                   Abuse/Neglect    No documentation.                  Legal    No documentation.                  Substance Abuse    No documentation.                  Patient Forms    No documentation.                     Ashley Cruz RN

## 2024-01-07 NOTE — ED NOTES
Nursing report ED to floor  Madhu Santoro  78 y.o.  male    HPI :   Chief Complaint   Patient presents with    Weakness - Generalized    Fall       Admitting doctor:   Salima Rogers MD    Admitting diagnosis:   The primary encounter diagnosis was Generalized weakness. Diagnoses of Unable to ambulate, Bandemia, and Orthostatic hypotension were also pertinent to this visit.    Code status:   Current Code Status       Date Active Code Status Order ID Comments User Context       Prior            Allergies:   Atorvastatin and Penicillins    Isolation:   No active isolations    Intake and Output  No intake or output data in the 24 hours ending 01/07/24 1559    Weight:       01/07/24  0854   Weight: 74.4 kg (164 lb)       Most recent vitals:   Vitals:    01/07/24 1321 01/07/24 1323 01/07/24 1331 01/07/24 1401   BP: 123/70 113/62 136/76 145/69   BP Location:    Right arm   Patient Position: Sitting Standing  Lying   Pulse: 95 108 90 90   Resp:    18   Temp:       TempSrc:       SpO2:   96% 96%   Weight:       Height:           Active LDAs/IV Access:   Lines, Drains & Airways       Active LDAs       Name Placement date Placement time Site Days    Peripheral IV 01/07/24 0927 Left Antecubital 01/07/24 0927  Antecubital  less than 1                    Labs (abnormal labs have a star):   Labs Reviewed   COMPREHENSIVE METABOLIC PANEL - Abnormal; Notable for the following components:       Result Value    Glucose 164 (*)     Creatinine 0.66 (*)     Alkaline Phosphatase 153 (*)     All other components within normal limits    Narrative:     GFR Normal >60  Chronic Kidney Disease <60  Kidney Failure <15    The GFR formula is only valid for adults with stable renal function between ages 18 and 70.   APTT - Abnormal; Notable for the following components:    PTT 42.5 (*)     All other components within normal limits   URINALYSIS W/ MICROSCOPIC IF INDICATED (NO CULTURE) - Abnormal; Notable for the following components:    Appearance,  UA Cloudy (*)     All other components within normal limits    Narrative:     Urine microscopic not indicated.   PROTIME-INR - Abnormal; Notable for the following components:    Protime 16.8 (*)     INR 1.34 (*)     All other components within normal limits   TROPONIN - Abnormal; Notable for the following components:    HS Troponin T 38 (*)     All other components within normal limits    Narrative:     High Sensitive Troponin T Reference Range:  <14.0 ng/L- Negative Female for AMI  <22.0 ng/L- Negative Male for AMI  >=14 - Abnormal Female indicating possible myocardial injury.  >=22 - Abnormal Male indicating possible myocardial injury.   Clinicians would have to utilize clinical acumen, EKG, Troponin, and serial changes to determine if it is an Acute Myocardial Infarction or myocardial injury due to an underlying chronic condition.        CBC WITH AUTO DIFFERENTIAL - Abnormal; Notable for the following components:    WBC 13.52 (*)     Hemoglobin 12.7 (*)     MCHC 31.4 (*)     RDW 15.9 (*)     RDW-SD 55.3 (*)     Platelets 498 (*)     Lymphocyte % 8.7 (*)     Monocyte % 13.7 (*)     Immature Grans % 3.0 (*)     Neutrophils, Absolute 9.77 (*)     Monocytes, Absolute 1.85 (*)     Immature Grans, Absolute 0.41 (*)     All other components within normal limits   HIGH SENSITIVITIY TROPONIN T 2HR - Abnormal; Notable for the following components:    HS Troponin T 35 (*)     All other components within normal limits    Narrative:     High Sensitive Troponin T Reference Range:  <14.0 ng/L- Negative Female for AMI  <22.0 ng/L- Negative Male for AMI  >=14 - Abnormal Female indicating possible myocardial injury.  >=22 - Abnormal Male indicating possible myocardial injury.   Clinicians would have to utilize clinical acumen, EKG, Troponin, and serial changes to determine if it is an Acute Myocardial Infarction or myocardial injury due to an underlying chronic condition.        COVID-19 AND FLU A/B, NP SWAB IN TRANSPORT MEDIA 1  "HR TAT - Normal    Narrative:     Fact sheet for providers: https://www.fda.gov/media/945597/download    Fact sheet for patients: https://www.fda.gov/media/174283/download    Test performed by PCR.   BNP (IN-HOUSE) - Normal    Narrative:     This assay is used as an aid in the diagnosis of individuals suspected of having heart failure. It can be used as an aid in the diagnosis of acute decompensated heart failure (ADHF) in patients presenting with signs and symptoms of ADHF to the emergency department (ED). In addition, NT-proBNP of <300 pg/mL indicates ADHF is not likely.    Age Range Result Interpretation  NT-proBNP Concentration (pg/mL:      <50             Positive            >450                   Gray                 300-450                    Negative             <300    50-75           Positive            >900                  Gray                300-900                  Negative            <300      >75             Positive            >1800                  Gray                300-1800                  Negative            <300   MAGNESIUM - Normal   LACTIC ACID, PLASMA - Normal   PROCALCITONIN - Normal    Narrative:     As a Marker for Sepsis (Non-Neonates):    1. <0.5 ng/mL represents a low risk of severe sepsis and/or septic shock.  2. >2 ng/mL represents a high risk of severe sepsis and/or septic shock.    As a Marker for Lower Respiratory Tract Infections that require antibiotic therapy:    PCT on Admission    Antibiotic Therapy       6-12 Hrs later    >0.5                Strongly Recommended  >0.25 - <0.5        Recommended   0.1 - 0.25          Discouraged              Remeasure/reassess PCT  <0.1                Strongly Discouraged     Remeasure/reassess PCT    As 28 day mortality risk marker: \"Change in Procalcitonin Result\" (>80% or <=80%) if Day 0 (or Day 1) and Day 4 values are available. Refer to http://www.DocVerses-pct-calculator.com    Change in PCT <=80%  A decrease of PCT levels below or equal " to 80% defines a positive change in PCT test result representing a higher risk for 28-day all-cause mortality of patients diagnosed with severe sepsis for septic shock.    Change in PCT >80%  A decrease of PCT levels of more than 80% defines a negative change in PCT result representing a lower risk for 28-day all-cause mortality of patients diagnosed with severe sepsis or septic shock.      BLOOD CULTURE   BLOOD CULTURE   TYPE AND SCREEN   CBC AND DIFFERENTIAL    Narrative:     The following orders were created for panel order CBC & Differential.  Procedure                               Abnormality         Status                     ---------                               -----------         ------                     CBC Auto Differential[[id number]]        Abnormal            Final result                 Please view results for these tests on the individual orders.       EKG:   ECG 12 Lead Other; weakness   Preliminary Result   HEART RATE= 93  bpm   RR Interval= 645  ms   MT Interval= 156  ms   P Horizontal Axis= -35  deg   P Front Axis= 75  deg   QRSD Interval= 102  ms   QT Interval= 355  ms   QTcB= 442  ms   QRS Axis= -31  deg   T Wave Axis= 63  deg   - ABNORMAL ECG -   Sinus rhythm   Left atrial enlargement   Left ventricular hypertrophy   Anterior Q waves, possibly due to LVH   Electronically Signed By:    Date and Time of Study: 2024-01-07 09:33:49          Meds given in ED:   Medications   sodium chloride 0.9 % bolus 500 mL (500 mL Intravenous New Bag 1/7/24 1410)   HYDROcodone-acetaminophen (NORCO) 7.5-325 MG per tablet 1 tablet (1 tablet Oral Given 1/7/24 1510)       Imaging results:  No radiology results for the last day    Ambulatory status:   - Assist x2     Social issues:   Social History     Socioeconomic History    Marital status:      Spouse name: Brooke    Years of education: 9th grade   Tobacco Use    Smoking status: Former     Packs/day: 1     Types: Cigarettes     Start date: 1/1/1959      Quit date: 1/1/2009     Years since quitting: 15.0    Smokeless tobacco: Never    Tobacco comments:     CAFFEINE USE   Vaping Use    Vaping Use: Never used   Substance and Sexual Activity    Alcohol use: Not Currently     Comment: rarely    Drug use: No    Sexual activity: Defer     Partners: Female       NIH Stroke Scale:       Jennifer Maguire RN  01/07/24 15:59 EST

## 2024-01-07 NOTE — CASE COMMUNICATION
SPOKE TO MIGUEL'S - HIS WIFE ANGLE AND SHE STATES pt IS BACK AT HOSPITAL AT [facility].

## 2024-01-07 NOTE — H&P
Internal medicine history and physical  INTERNAL MEDICINE   [facility]       Patient Identification:  Name: Madhu Santoro  Age: 78 y.o.  Sex: male  :  1945  MRN: [id number]                   Primary Care Physician: Damian Sanchez MD                               Date of admission:2024    Chief Complaint: Progressive generalized weakness and recurrent falls since his arrival back home from rehabilitation facility.    History of Present Illness:   Patient is a 78-year-old male who has complicated past medical history including history of stroke with balance issues, history of hypertension, atrial fibrillation/flutter for which she has had ablation, degenerative disc disease of the lumbosacral spine, coronary artery disease with history of coronary artery bypass grafting and unsteadiness due to prior stroke and dysphagia with history of PEG tube placement in 2023, history of GI bleed who was recently hospitalized in November and was subsequently admitted to the rehabilitation facility.  He was discharged home on this past Friday and has been progressively getting weaker and unable to walk around.  He has fallen couple times at home since his arrival from rehab place and scraped the left side of his forehead as he had the chair when he fell.  He did not lose consciousness.  Patient is becoming progressively difficult to be cared for at home and today was brought to the emergency room for further evaluation.  Extensive workup in the emergency room did not reveal any acute process except for mildly elevated white blood cell count.  Patient did have significant orthostatic drop in blood pressure with associated elevation in heart rate.  Patient denies any burning urination cough congestion fever or chills.    Past Medical History:  Past Medical History:   Diagnosis Date    Anxiety     Arthritis     Atrial flutter     Status post cavotricuspid isthmus ablation by Dr. Gustafson on  "4/18/17    Mandel esophagus     Benign essential hypertension     CAD (coronary artery disease)     3 vessel CABG 4/11/17 by Dr. Cortez: ROSARIO-prox LAD, SVG-OM1, SVG-OM3    Carotid artery disease     Status post carotid endarterectomy - USG 4/10/17: 50-59% NICHELLE, 1-15% LICA.     Colonic polyp     Cyst of pancreas     DDD (degenerative disc disease), lumbosacral     GERD (gastroesophageal reflux disease)     H/O bone density study 2013    H/O complete eye exam 2014    History of kidney stone     HLD (hyperlipidemia)     Hypertension     Kidney stone     8/22/22    Lipid screening 05/31/2013    Low back pain     [facility]    Screening for prostate cancer 07/07/2015    Skin cancer     nose    Stroke     RESIDUAL--\"BALANCE ISSUES\"     Past Surgical History:  Past Surgical History:   Procedure Laterality Date    CARDIAC CATHETERIZATION N/A 04/10/2017    Procedure: Left Heart Cath;  Surgeon: Marjorie Healy MD;  [facility];  Service:     CARDIAC CATHETERIZATION N/A 04/10/2017    Procedure: Coronary angiography;  Surgeon: Marjorie Healy MD;  Location:  [facility] CATH INVASIVE LOCATION;  Service:     CARDIAC CATHETERIZATION N/A 04/10/2017    Procedure: Left ventriculography;  Surgeon: Marjorie Healy MD;  [facility];  Service:     CARDIAC CATHETERIZATION  2011    CARDIAC ELECTROPHYSIOLOGY PROCEDURE N/A 04/18/2017    Procedure: Ablation atrial flutter;  Surgeon: Jose Antonio Gustafson MD;  Location:  [facility] CATH INVASIVE LOCATION;  Service:     CAROTID ENDARTERECTOMY      CHOLECYSTECTOMY      CHOLECYSTECTOMY WITH INTRAOPERATIVE CHOLANGIOGRAM N/A 09/07/2019    Procedure: Laparoscopic cholecystectomy with intraoperative cholangiogram;  Surgeon: Gauri Travis MD;  Location: [facility] MAIN OR;  Service: General    COLONOSCOPY  01/06/2015    Diverticulosis, one TA    COLONOSCOPY N/A 02/14/2019    tics, NBIH, adenomatous polyp x 2    COLONOSCOPY N/A " 11/05/2021    Procedure: COLONOSCOPY TO CECUM AND TERM. ILEUM WITH COLD POLYPECTOMIES;  Surgeon: Everton Abel MD;  [facility];  Service: Gastroenterology;  Laterality: N/A;  PRE OP - PERS H/O POLYPS  POST OP - COLON POLYPS,, DIVERTICULOSIS, HEMORRHOIDS    CORONARY ARTERY BYPASS GRAFT N/A 04/11/2017    Procedure: AR STERNOTOMY CORONARY ARTERY BYPASS GRAFT TIMES 3 USING LEFT INTERNAL MAMMARY ARTERY AND LEFT GREATER SAPHENOUS VEIN GRAFT PER ENDOSCOPIC VEIN HARVESTING AND PRP ;  Surgeon: Temo Cortez MD;  Location: [facility] MAIN OR;  Service:     ENDOSCOPY  01/06/2015    HH, Ervin's esophagus    ENDOSCOPY N/A 02/14/2019    Z line irregular, HH, Ervin's esophagus    ENDOSCOPY N/A 11/05/2021    Procedure: ESOPHAGOGASTRODUODENOSCOPY WITH BIOPSIES;  Surgeon: Everton Abel MD;  [facility];  Service: Gastroenterology;  Laterality: N/A;  PRE OP - PERS H/O ERVIN'S  POST OP - IRREG Z LINE    ENDOSCOPY W/ PEG TUBE PLACEMENT N/A 11/6/2023    Procedure: ESOPHAGOGASTRODUODENOSCOPY WITH PERCUTANEOUS ENDOSCOPIC GASTROSTOMY TUBE INSERTION;  Surgeon: Temo Ramsey MD;  [facility];  Service: General;  Laterality: N/A;  Pre: dysphagia  Post: same    KNEE SURGERY Left     URETEROSCOPY LASER LITHOTRIPSY WITH STENT INSERTION Left 8/23/2022    Procedure: Cysto retrograde with left uretro stent placement;  Surgeon: Damien Oliveira MD;  Location: [facility] MAIN OR;  Service: Urology;  Laterality: Left;    VASECTOMY        Home Meds:  Facility-Administered Medications Prior to Admission   Medication Dose Route Frequency Provider Last Rate Last Admin    cyanocobalamin injection 1,000 mcg  1,000 mcg Intramuscular Q30 Days Damian Sanchez MD   1,000 mcg at 04/06/23 0957     Medications Prior to Admission   Medication Sig Dispense Refill Last Dose    amitriptyline (ELAVIL) 50 MG tablet Administer 1 tablet per G tube Every Night.       amLODIPine (NORVASC) 5 MG tablet  Administer 1 tablet per G tube Daily.       apixaban (ELIQUIS) 2.5 MG tablet tablet Take 1 tablet by mouth Every 12 (Twelve) Hours. Indications: Atrial Fibrillation       HYDROcodone-acetaminophen (Norco) 7.5-325 MG per tablet Take 1 tablet by mouth 3 (Three) Times a Day. Hold if sedation 12 tablet 0     rosuvastatin (Crestor) 20 MG tablet Administer 1 tablet per G tube Every Night.       acetaminophen (TYLENOL) 325 MG tablet Administer 2 tablets per G tube Every 6 (Six) Hours As Needed for Mild Pain.       guaifenesin (ROBITUSSIN) 100 MG/5ML liquid Take 10 mL by mouth Every 4 (Four) Hours As Needed for Cough.       ipratropium-albuterol (DUO-NEB) 0.5-2.5 mg/3 ml nebulizer Take 3 mL by nebulization Every 4 (Four) Hours As Needed for Shortness of Air.       lansoprazole (PREVACID SOLUTAB) 30 MG Tablet Delayed Release Dispersible disintegrating tablet Administer 1 tablet per G tube 2 (Two) Times a Day Before Meals.       Menthol-Zinc Oxide 0.44-20.6 % ointment Apply 1 application  topically to the appropriate area as directed Every 12 (Twelve) Hours.       sennosides-docusate (PERICOLACE) 8.6-50 MG per tablet Administer 2 tablets per G tube 2 (Two) Times a Day As Needed for Constipation.       sucralfate (CARAFATE) 1 g tablet Administer 1 tablet per G tube 2 (Two) Times a Day Before Meals.        Current Meds:   No current facility-administered medications for this encounter.  Allergies:  Allergies   Allergen Reactions    Atorvastatin Myalgia     Myalgia    Penicillins Hives, Swelling and Rash     Tolerates cephalosporins      Social History:   Social History     Tobacco Use    Smoking status: Former     Packs/day: 1     Types: Cigarettes     Start date: 1/1/1959     Quit date: 1/1/2009     Years since quitting: 15.0    Smokeless tobacco: Never    Tobacco comments:     CAFFEINE USE   Substance Use Topics    Alcohol use: Not Currently     Comment: rarely      Family History:  Family History   Problem Relation Age of  "Onset    Hypertension Mother     Heart disease Mother     Heart attack Mother     Stroke Mother     Heart disease Father     Heart attack Father     Stroke Father     Hypertension Sister     Heart attack Brother     Heart disease Brother     No Known Problems Brother     Heart disease Brother     Heart attack Brother     Diabetes Brother     No Known Problems Maternal Grandmother     No Known Problems Maternal Grandfather     No Known Problems Paternal Grandmother     No Known Problems Paternal Grandfather     Pancreatic cancer Paternal Cousin     Arthritis Other     Diabetes Other     Hypertension Other     Kidney disease Other         stones    Malig Hyperthermia Neg Hx           Review of Systems  See history of present illness and past medical history.  As described in history of presenting illness.      Vitals:   /73 (BP Location: Right arm, Patient Position: Lying)   Pulse 90   Temp 97.7 °F (36.5 °C) (Tympanic)   Resp 18   Ht 172.7 cm (68\")   Wt 74.4 kg (164 lb)   SpO2 92%   BMI 24.94 kg/m²   I/O:   Intake/Output Summary (Last 24 hours) at 1/7/2024 1710  Last data filed at 1/7/2024 1616  Gross per 24 hour   Intake 500 ml   Output 100 ml   Net 400 ml     Exam:  Patient is examined using the personal protective equipment as per guidelines from infection control for this particular patient as enacted.  Hand washing was performed before and after patient interaction.  General Appearance:  Awake cooperative older than stated age male does not appear to be in any acute distress and responds appropriately.   Head:    Normocephalic, without obvious abnormality, abrasion and scab on the left forehead.   Eyes:    PERRL, conjunctiva/corneas clear, EOM's intact, both eyes   Ears:    Normal external ear canals, both ears   Nose:   Nares normal, septum midline, mucosa normal, no drainage    or sinus tenderness   Throat: Dry oral mucosa   Neck:   Supple, symmetrical, trachea midline, no adenopathy;     thyroid:  " no enlargement/tenderness/nodules; no carotid    bruit or JVD   Back:     Symmetric, no curvature, ROM normal, no CVA tenderness   Lungs:     Clear to auscultation bilaterally, respirations unlabored   Chest Wall:    No tenderness or deformity    Heart:  S1-S2 irregular   Abdomen:   PEG tube in place   Extremities:   Extremities normal, atraumatic, no cyanosis or edema   Pulses:   Pulses palpable in all extremities; symmetric all extremities   Skin: No rash noted bruising and ecchymosis noted   Neurologic: Awake and interactive gait not tested speech is altered due to previous stroke and dysphagia       Data Review:      I reviewed the patient's new clinical results.  Results from last 7 days   Lab Units 01/07/24  0928   WBC 10*3/mm3 13.52*   HEMOGLOBIN g/dL 12.7*   PLATELETS 10*3/mm3 498*     Results from last 7 days   Lab Units 01/07/24  0928   SODIUM mmol/L 139   POTASSIUM mmol/L 4.3   CHLORIDE mmol/L 101   CO2 mmol/L 27.0   BUN mg/dL 11   CREATININE mg/dL 0.66*   CALCIUM mg/dL 9.7   GLUCOSE mg/dL 164*     .rads  Microbiology Results (last 10 days)       Procedure Component Value - Date/Time    Respiratory Panel PCR w/COVID-19(SARS-CoV-2) [facility] In-House, NP Swab in UTM/VTM, 2 HR TAT - Swab, Nasopharynx [[id number]]  (Normal) Collected: 01/07/24 1841    Lab Status: Final result Specimen: Swab from Nasopharynx Updated: 01/07/24 1931     ADENOVIRUS, PCR Not Detected     Coronavirus 229E Not Detected     Coronavirus HKU1 Not Detected     Coronavirus NL63 Not Detected     Coronavirus OC43 Not Detected     COVID19 Not Detected     Human Metapneumovirus Not Detected     Human Rhinovirus/Enterovirus Not Detected     Influenza A PCR Not Detected     Influenza B PCR Not Detected     Parainfluenza Virus 1 Not Detected     Parainfluenza Virus 2 Not Detected     Parainfluenza Virus 3 Not Detected     Parainfluenza Virus 4 Not Detected     RSV, PCR Not Detected     Bordetella pertussis pcr Not Detected      Bordetella parapertussis PCR Not Detected     Chlamydophila pneumoniae PCR Not Detected     Mycoplasma pneumo by PCR Not Detected    Narrative:      In the setting of a positive respiratory panel with a viral infection PLUS a negative procalcitonin without other underlying concern for bacterial infection, consider observing off antibiotics or discontinuation of antibiotics and continue supportive care. If the respiratory panel is positive for atypical bacterial infection (Bordetella pertussis, Chlamydophila pneumoniae, or Mycoplasma pneumoniae), consider antibiotic de-escalation to target atypical bacterial infection.    COVID-19 and FLU A/B PCR, 1 HR TAT - Swab, Nasopharynx [[id number]]  (Normal) Collected: 01/07/24 1214    Lab Status: Final result Specimen: Swab from Nasopharynx Updated: 01/07/24 1301     COVID19 Not Detected     Influenza A PCR Not Detected     Influenza B PCR Not Detected    Narrative:      Fact sheet for providers: https://www.fda.gov/media/672923/download    Fact sheet for patients: https://www.fda.gov/media/573727/download    Test performed by PCR.          ECG 12 Lead Other; weakness   Final Result   HEART RATE= 93  bpm   RR Interval= 645  ms   FL Interval= 156  ms   P Horizontal Axis= -35  deg   P Front Axis= 75  deg   QRSD Interval= 102  ms   QT Interval= 355  ms   QTcB= 442  ms   QRS Axis= -31  deg   T Wave Axis= 63  deg   - ABNORMAL ECG -   Sinus rhythm   Left atrial enlargement   Left ventricular hypertrophy   Anterior Q waves, possibly due to LVH   No change from previous tracing   Electronically Signed By: Marjorie Healy ([facility]) 07-Jan-2024 16:23:41   Date and Time of Study: 2024-01-07 09:33:49      SCANNED - TELEMETRY     Final Result      SCANNED - TELEMETRY     Final Result      SCANNED - TELEMETRY     Final Result      SCANNED - TELEMETRY     Final Result      SCANNED - TELEMETRY     Final Result      SCANNED - TELEMETRY     Final Result      SCANNED - TELEMETRY     Final Result             Assessment:  Active Hospital Problems    Diagnosis  POA    **Orthostatic dizziness [R42]  Yes    Oral phase dysphagia [R13.11]  Yes    History of CVA (cerebrovascular accident) [Z86.73]  Not Applicable    S/P percutaneous endoscopic gastrostomy (PEG) tube placement [Z93.1]  Not Applicable    Chronic anticoagulation [Z79.01]  Not Applicable    S/P ablation of atrial flutter [Z98.890, Z86.79]  Not Applicable    DDD (degenerative disc disease), lumbosacral [M51.37]  Yes    Benign essential hypertension [I10]  Yes       Medical decision making/care plan: See admitting orders  Recurrent falls with superficial injuries and no loss of consciousness likely due to dehydration and orthostatic drop in blood pressure with preserved chronotropic response to posture-plan is to hydrate the patient, check orthostatics and hold blood pressure medicine if his systolic blood pressure is less than 100.  Closed head injury following fall with history of anticoagulation therapy which is ongoing-check CT scan of the head without contrast and monitor his neurological function and provide him with fall precautions.  Dysphagia due to prior CVA with associated mobility issues status post prolonged rehabilitation discharge couple of days ago now back in the hospital-plan is to continue with aspiration precaution uses feeding tube for medication administration and diet/hydration/nutrition and consult physical therapy and speech therapy and Occupational Therapy.  Patient may need a proper discharge disposition as he may be becoming difficult to be cared for at home because of his needs.  History of atrial fibrillation flutter status post ablation currently on chronic anticoagulation therapy and rate is controlled.  Leukocytosis likely reactive-follow-up on cultures and check respiratory viral panel to rule out other respiratory viruses besides COVID and influenza contributing to this presentation.  Salima Rogers MD   1/7/2024  17:10  EST    Parts of this note may be an electronic transcription/translation of spoken language to printed text using the Dragon dictation system.

## 2024-01-07 NOTE — ED PROVIDER NOTES
EMERGENCY DEPARTMENT ENCOUNTER      PCP: Damian Sanchez MD  Patient Care Team:  Damian Sanchez MD as PCP - General (Family Medicine)  Jr Temo Cortez MD as Surgeon (Cardiothoracic Surgery)  Netta Mayorga Jr., MD as Consulting Physician (Vascular Surgery)  Damian Sanchez MD as Referring Physician (Family Medicine)  Aquiles Lopez MD as Consulting Physician (Hematology and Oncology)  Christy Luther APRN as Nurse Practitioner (Nurse Practitioner)  Damian Sanchez MD as Referring Physician (Family Medicine)   Independent Historians: Patient, EMS, family at bedside    HPI:  Chief Complaint: Generalized weakness, falls  A complete HPI/ROS/PMH/PSH/SH/FH are unobtainable due to: None    Chronic or social conditions impacting patient care (social determinants of health): None    Context: Madhu Santoro is a 78 y.o. male who presents to the ED c/o generalized weakness since being discharged from his skilled nursing facility 2 days ago.  Family states the patient was able to ambulate with his walker and their assistance into his home but when they tried to go down a couple steps at home to his den his legs gave out on him.  They state over the next couple days he has continued to decline and is now not able to really ambulate at all.  The patient denies having pain outside of his chronic right lower extremity pain which she reports is related to lumbar radiculopathy.  Son at bedside states patient tried to lean over to get the remote control that had fallen on the ground and slid out of his chair causing a skin tear to his left forehead.  The patient had not lost consciousness at that time and has been acting appropriately without confusion since then.  He does continue to take Eliquis although it is now a low-dose of 2.5 mg twice daily.  He is not taking aspirin.  Patient denies any gross blood in his stool or any melena.  He is not having any abdominal pain.  Previously the patient was having  Significant objective improvement from treatment. Subjective irritation OD that is temporarily relieved with PFATs. Patient to see Dr. Mei Dolan for punctal plug OD before cataract surgery. Continue PFATs QID OU, Lid scrubs BID OU. Can discontinue Maxitrol at this time. Patient is cleared for Advanced goal: emmetropia OU. problems with emesis at his skilled nursing facility but this resolved once they changed feedings.  He is able to take some soft foods by mouth such as scrambled eggs and grits.  No reported fevers, cough, congestion, chest pain, or shortness of breath.    Review of prior external notes and/or external test results outside of this encounter: Reviewed discharge summary from hospital admission in November 2023.  Patient was admitted for weakness and worsening anemia found to have GI bleed most likely related to recent PEG tube and anticoagulant.  PPI changed to twice daily and patient was to transition Eliquis to low-dose and hold aspirin.  Discharged to skilled nursing facility.      PAST MEDICAL HISTORY  Active Ambulatory Problems     Diagnosis Date Noted    Anxiety     Arthritis     Coronary artery disease due to lipid rich plaque     HLD (hyperlipidemia)     Benign essential hypertension     DDD (degenerative disc disease), lumbosacral     Cerebrovascular accident     Encounter for screening for cardiovascular disorders 11/20/2012    Gastroesophageal reflux disease 04/04/2016    Hyperlipidemia 04/04/2016    Stenosis of carotid artery 04/26/2017    Former smoker 04/26/2017    History of cardiac catheterization 12/16/2011    S/P ablation of atrial flutter 04/26/2017    Typical atrial flutter 04/26/2017    S/P CABG (coronary artery bypass graft) 04/26/2017    Adenomatous polyp of ascending colon 02/12/2019    Abdominal bloating 02/12/2019    Stroke 03/29/2019    PAD (peripheral artery disease) 03/29/2019    Acute cholecystitis 09/06/2019    Right upper quadrant abdominal pain 09/06/2019    Chronic pain of both hips 03/31/2021    Chronic bilateral low back pain without sciatica 03/31/2021    Sacroiliac joint dysfunction of both sides 03/31/2021    Encounter for long-term (current) use of high-risk medication 03/31/2021    Lumbar facet arthropathy 03/31/2021    Stroke-like symptoms 04/10/2021    CVA (cerebral vascular  accident) 04/11/2021    Personal history of colonic polyps 07/23/2021    Arthralgia of multiple joints 09/28/2021    Iron deficiency 08/16/2022    Thrombocytosis 08/16/2022    Left ureteral stone 08/22/2022    Obstructive uropathy 08/22/2022    Chronic anticoagulation 08/22/2022    Facial skin lesion 08/22/2022    Iron deficiency anemia 09/23/2022    Polycythemia 01/18/2023    Neuropathy 02/08/2023    Weakness 02/19/2023    Pneumonia 02/19/2023    Close exposure to COVID-19 virus 02/19/2023    Polycythemia vera 04/17/2023    Chronic gout without tophus 07/09/2023    COVID-19 10/23/2023    Cytokine release syndrome, grade 2 11/09/2023    Anemia 11/22/2023    History of CVA (cerebrovascular accident) 11/22/2023    S/P percutaneous endoscopic gastrostomy (PEG) tube placement 11/22/2023    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    Sacral decubitus ulcer 11/22/2023    Oral phase dysphagia 11/24/2023    Acute blood loss anemia 11/24/2023     Resolved Ambulatory Problems     Diagnosis Date Noted    Elevated troponin 11/22/2023    Leukocytosis 11/22/2023    Hypoxia 11/29/2023     Past Medical History:   Diagnosis Date    Atrial flutter     CAD (coronary artery disease)     Carotid artery disease     Colonic polyp     Cyst of pancreas     GERD (gastroesophageal reflux disease)     H/O bone density study 2013    H/O complete eye exam 2014    History of kidney stone     Hypertension     Kidney stone     Lipid screening 05/31/2013    Low back pain     Screening for prostate cancer 07/07/2015    Skin cancer        The patient has started, but not completed, their COVID-19 vaccination series.    PAST SURGICAL HISTORY  Past Surgical History:   Procedure Laterality Date    CARDIAC CATHETERIZATION N/A 04/10/2017    Procedure: Left Heart Cath;  Surgeon: Marjorie Healy MD;  Location: [facility] CATH INVASIVE LOCATION;  Service:     CARDIAC CATHETERIZATION N/A 04/10/2017    Procedure: Coronary angiography;  Surgeon: Marjorie Healy MD;  Location:  [facility] CATH INVASIVE LOCATION;  Service:     CARDIAC CATHETERIZATION N/A 04/10/2017    Procedure: Left ventriculography;  Surgeon: Marjorie Healy MD;  Location: [facility] CATH INVASIVE LOCATION;  Service:     CARDIAC CATHETERIZATION  2011    CARDIAC ELECTROPHYSIOLOGY PROCEDURE N/A 04/18/2017    Procedure: Ablation atrial flutter;  Surgeon: Jose Antonio Gustafson MD;  Location: [facility] CATH INVASIVE LOCATION;  Service:     CAROTID ENDARTERECTOMY      CHOLECYSTECTOMY      CHOLECYSTECTOMY WITH INTRAOPERATIVE CHOLANGIOGRAM N/A 09/07/2019    Procedure: Laparoscopic cholecystectomy with intraoperative cholangiogram;  Surgeon: Gauri Travis MD;  Location: [facility] OR;  Service: General    COLONOSCOPY  01/06/2015    Diverticulosis, one TA    COLONOSCOPY N/A 02/14/2019    tics, NBIH, adenomatous polyp x 2    COLONOSCOPY N/A 11/05/2021    Procedure: COLONOSCOPY TO CECUM AND TERM. ILEUM WITH COLD POLYPECTOMIES;  Surgeon: Everton Abel MD;  Location: [facility] ENDOSCOPY;  Service: Gastroenterology;  Laterality: N/A;  PRE OP - PERS H/O POLYPS  POST OP - COLON POLYPS,, DIVERTICULOSIS, HEMORRHOIDS    CORONARY ARTERY BYPASS GRAFT N/A 04/11/2017    Procedure: AR STERNOTOMY CORONARY ARTERY BYPASS GRAFT TIMES 3 USING LEFT INTERNAL MAMMARY ARTERY AND LEFT GREATER SAPHENOUS VEIN GRAFT PER ENDOSCOPIC VEIN HARVESTING AND PRP ;  Surgeon: Temo Cortez MD;  Location: [facility] OR;  Service:     ENDOSCOPY  01/06/2015    HH, Ervin's esophagus    ENDOSCOPY N/A 02/14/2019    Z line irregular, HH, Ervin's esophagus    ENDOSCOPY N/A 11/05/2021    Procedure: ESOPHAGOGASTRODUODENOSCOPY WITH BIOPSIES;  Surgeon: Everton Abel MD;  Location: [facility] ENDOSCOPY;  Service: Gastroenterology;  Laterality: N/A;  PRE OP - PERS H/O ERVIN'S  POST OP - IRREG Z LINE    ENDOSCOPY W/ PEG TUBE PLACEMENT N/A 11/6/2023    Procedure: ESOPHAGOGASTRODUODENOSCOPY WITH PERCUTANEOUS ENDOSCOPIC GASTROSTOMY TUBE INSERTION;  Surgeon: Temo Ramsey MD;   Location: [facility] ENDOSCOPY;  Service: General;  Laterality: N/A;  Pre: dysphagia  Post: same    KNEE SURGERY Left     URETEROSCOPY LASER LITHOTRIPSY WITH STENT INSERTION Left 8/23/2022    Procedure: Cysto retrograde with left uretro stent placement;  Surgeon: Damien Oliveira MD;  Location: [facility] MAIN OR;  Service: Urology;  Laterality: Left;    VASECTOMY           FAMILY HISTORY  Family History   Problem Relation Age of Onset    Hypertension Mother     Heart disease Mother     Heart attack Mother     Stroke Mother     Heart disease Father     Heart attack Father     Stroke Father     Hypertension Sister     Heart attack Brother     Heart disease Brother     No Known Problems Brother     Heart disease Brother     Heart attack Brother     Diabetes Brother     No Known Problems Maternal Grandmother     No Known Problems Maternal Grandfather     No Known Problems Paternal Grandmother     No Known Problems Paternal Grandfather     Pancreatic cancer Paternal Cousin     Arthritis Other     Diabetes Other     Hypertension Other     Kidney disease Other         stones    Malig Hyperthermia Neg Hx          SOCIAL HISTORY  Social History     Socioeconomic History    Marital status:      Spouse name: Brooke    Years of education: 9th grade   Tobacco Use    Smoking status: Former     Packs/day: 1     Types: Cigarettes     Start date: 1/1/1959     Quit date: 1/1/2009     Years since quitting: 15.0    Smokeless tobacco: Never    Tobacco comments:     CAFFEINE USE   Vaping Use    Vaping Use: Never used   Substance and Sexual Activity    Alcohol use: Not Currently     Comment: rarely    Drug use: No    Sexual activity: Defer     Partners: Female         ALLERGIES  Atorvastatin and Penicillins        REVIEW OF SYSTEMS  Review of Systems   Constitutional:  Negative for fever.   Respiratory:  Negative for cough and shortness of breath.    Cardiovascular:  Negative for chest pain.   Gastrointestinal:  Negative for  abdominal pain, blood in stool, diarrhea and nausea.   Musculoskeletal:  Positive for back pain (Chronic low).   Neurological:  Positive for weakness (Generalized).        All systems reviewed and negative except for those discussed in HPI.       PHYSICAL EXAM    I have reviewed the triage vital signs and nursing notes.    ED Triage Vitals [01/07/24 0840]   Temp Heart Rate Resp BP SpO2   97.7 °F (36.5 °C) 102 18 153/78 98 %      Temp src Heart Rate Source Patient Position BP Location FiO2 (%)   Tympanic Monitor Sitting Left arm --       Physical Exam  GENERAL: alert, no acute distress  SKIN: Warm, dry  HENT: Normocephalic, atraumatic  EYES: no scleral icterus  CV: regular rhythm, regular rate  RESPIRATORY: normal effort, lungs clear  ABDOMEN: soft, nontender, nondistended, PEG tube in place  MUSCULOSKELETAL: no deformity  NEURO: alert, moves all extremities, follows commands          LAB RESULTS  Recent Results (from the past 24 hour(s))   Comprehensive Metabolic Panel    Collection Time: 01/07/24  9:28 AM    Specimen: Blood   Result Value Ref Range    Glucose 164 (H) 65 - 99 mg/dL    BUN 11 8 - 23 mg/dL    Creatinine 0.66 (L) 0.76 - 1.27 mg/dL    Sodium 139 136 - 145 mmol/L    Potassium 4.3 3.5 - 5.2 mmol/L    Chloride 101 98 - 107 mmol/L    CO2 27.0 22.0 - 29.0 mmol/L    Calcium 9.7 8.6 - 10.5 mg/dL    Total Protein 7.1 6.0 - 8.5 g/dL    Albumin 3.8 3.5 - 5.2 g/dL    ALT (SGPT) 17 1 - 41 U/L    AST (SGOT) 39 1 - 40 U/L    Alkaline Phosphatase 153 (H) 39 - 117 U/L    Total Bilirubin 0.3 0.0 - 1.2 mg/dL    Globulin 3.3 gm/dL    A/G Ratio 1.2 g/dL    BUN/Creatinine Ratio 16.7 7.0 - 25.0    Anion Gap 11.0 5.0 - 15.0 mmol/L    eGFR 95.4 >60.0 mL/min/1.73   aPTT    Collection Time: 01/07/24  9:28 AM    Specimen: Blood   Result Value Ref Range    PTT 42.5 (H) 22.7 - 35.4 seconds   Protime-INR    Collection Time: 01/07/24  9:28 AM    Specimen: Blood   Result Value Ref Range    Protime 16.8 (H) 11.7 - 14.2 Seconds    INR  1.34 (H) 0.90 - 1.10   Type & Screen    Collection Time: 01/07/24  9:28 AM    Specimen: Blood   Result Value Ref Range    ABO Type O     RH type Negative     Antibody Screen Negative     T&S Expiration Date 1/10/2024 11:59:59 PM    High Sensitivity Troponin T    Collection Time: 01/07/24  9:28 AM    Specimen: Blood   Result Value Ref Range    HS Troponin T 38 (H) <22 ng/L   BNP    Collection Time: 01/07/24  9:28 AM    Specimen: Blood   Result Value Ref Range    proBNP 149.0 0.0 - 1,800.0 pg/mL   Magnesium    Collection Time: 01/07/24  9:28 AM    Specimen: Blood   Result Value Ref Range    Magnesium 2.0 1.6 - 2.4 mg/dL   CBC Auto Differential    Collection Time: 01/07/24  9:28 AM    Specimen: Blood   Result Value Ref Range    WBC 13.52 (H) 3.40 - 10.80 10*3/mm3    RBC 4.28 4.14 - 5.80 10*6/mm3    Hemoglobin 12.7 (L) 13.0 - 17.7 g/dL    Hematocrit 40.5 37.5 - 51.0 %    MCV 94.6 79.0 - 97.0 fL    MCH 29.7 26.6 - 33.0 pg    MCHC 31.4 (L) 31.5 - 35.7 g/dL    RDW 15.9 (H) 12.3 - 15.4 %    RDW-SD 55.3 (H) 37.0 - 54.0 fl    MPV 10.5 6.0 - 12.0 fL    Platelets 498 (H) 140 - 450 10*3/mm3    Neutrophil % 72.2 42.7 - 76.0 %    Lymphocyte % 8.7 (L) 19.6 - 45.3 %    Monocyte % 13.7 (H) 5.0 - 12.0 %    Eosinophil % 2.0 0.3 - 6.2 %    Basophil % 0.4 0.0 - 1.5 %    Immature Grans % 3.0 (H) 0.0 - 0.5 %    Neutrophils, Absolute 9.77 (H) 1.70 - 7.00 10*3/mm3    Lymphocytes, Absolute 1.17 0.70 - 3.10 10*3/mm3    Monocytes, Absolute 1.85 (H) 0.10 - 0.90 10*3/mm3    Eosinophils, Absolute 0.27 0.00 - 0.40 10*3/mm3    Basophils, Absolute 0.05 0.00 - 0.20 10*3/mm3    Immature Grans, Absolute 0.41 (H) 0.00 - 0.05 10*3/mm3    nRBC 0.0 0.0 - 0.2 /100 WBC   Procalcitonin    Collection Time: 01/07/24  9:28 AM    Specimen: Blood   Result Value Ref Range    Procalcitonin 0.13 0.00 - 0.25 ng/mL   ECG 12 Lead Other; weakness    Collection Time: 01/07/24  9:33 AM   Result Value Ref Range    QT Interval 355 ms    QTC Interval 442 ms   Urinalysis With  Microscopic If Indicated (No Culture) - Straight Cath    Collection Time: 01/07/24 11:18 AM    Specimen: Straight Cath; Urine   Result Value Ref Range    Color, UA Yellow Yellow, Straw    Appearance, UA Cloudy (A) Clear    pH, UA 7.5 5.0 - 8.0    Specific Gravity, UA 1.014 1.005 - 1.030    Glucose, UA Negative Negative    Ketones, UA Negative Negative    Bilirubin, UA Negative Negative    Blood, UA Negative Negative    Protein, UA Negative Negative    Leuk Esterase, UA Negative Negative    Nitrite, UA Negative Negative    Urobilinogen, UA 1.0 E.U./dL 0.2 - 1.0 E.U./dL   COVID-19 and FLU A/B PCR, 1 HR TAT - Swab, Nasopharynx    Collection Time: 01/07/24 12:14 PM    Specimen: Nasopharynx; Swab   Result Value Ref Range    COVID19 Not Detected Not Detected - Ref. Range    Influenza A PCR Not Detected Not Detected    Influenza B PCR Not Detected Not Detected   High Sensitivity Troponin T 2Hr    Collection Time: 01/07/24 12:27 PM    Specimen: Blood   Result Value Ref Range    HS Troponin T 35 (H) <22 ng/L    Troponin T Delta -3 >=-4 - <+4 ng/L   Lactic Acid, Plasma    Collection Time: 01/07/24 12:27 PM    Specimen: Blood   Result Value Ref Range    Lactate 0.9 0.5 - 2.0 mmol/L       Ordered the above labs and independently reviewed and interpreted the results.        RADIOLOGY  XR Chest 1 View    Result Date: 1/7/2024  ONE-VIEW PORTABLE CHEST  HISTORY: Weakness. Hypertension.  FINDINGS: The lungs are well expanded and clear. The heart size is normal with sternal wires and previous cardiac surgery and there is no acute disease or change from 11/22/2023.  This report was finalized on 1/7/2024 9:59 AM by Dr. Vik Hawkins M.D on Workstation: [id number]       I ordered the above noted radiological studies. Independently reviewed and interpreted by me.  See dictation for official radiology interpretation.      PROCEDURES    Procedures      MEDICATIONS GIVEN IN ER    Medications   sodium chloride 0.9 % bolus 500 mL (500 mL  Intravenous New Bag 1/7/24 1410)   HYDROcodone-acetaminophen (NORCO) 7.5-325 MG per tablet 1 tablet (1 tablet Oral Given 1/7/24 1510)         PROGRESS, DATA ANALYSIS, CONSULTS, AND MEDICAL DECISION MAKING    All labs have been independently reviewed and interpreted by me.  All radiology studies have been independently reviewed and interpreted by me and discussed with radiologist dictating the report.   EKG's independently reviewed and interpreted by me.  Discussion below represents my analysis of pertinent findings related to patient's condition, differential diagnosis, treatment plan and final disposition.      ED Course as of 01/07/24 1556   Sun Jan 07, 2024   0930 My differential diagnosis for generalized weakness includes but is not limited to:  Neuromuscular weakness   CVA  Hemorrhagic stroke  Multiple sclerosis  Amyotrophic Lateral Sclerosis (ALS) (UMN & LMN)  Spinal and bulbar muscular atrophy (Mejia's syndrome)  Spinal cord disease: Infection (Epidural abscess)  Infarction/ischemia  Trauma (Spinal Cord Syndromes)  Inflammation (Transverse Myelitis)  Degenerative (Spinal muscular atrophy)  Tumor  Peripheral nerve disease: Guillain-[place] syndrome  Toxins ([place])  Tick paralysis  Diabetic peripheral neuropathy  NMJ disease: Myasthenia gravis crisis  Botulism  Organophosphate toxicity  Lambert-Eaton myasthenic syndrome  Rhabdomyolysis  Dermatomyositis  Polymyositis  Alcoholic myopathy  Non-neuromuscular weakness   ACS  Arrhythmia/Syncope  Severe infection/Sepsis  Hypoglycemia  Periodic paralysis (electrolyte disturbance, K, Mg, Ca)   Hypokalemic periodic paralysis  Thyrotoxic periodic paralysis  Respiratory failure  Symptomatic Anemia  Severe dehydration  Hypothyroidism  Polypharmacy  Malignancy           [JG]   0943 WBC(!): 13.52 [DC]   0943 Hemoglobin(!): 12.7 [DC]   0945 EKG independently viewed and contemporaneously interpreted by ED physician. Time: 9:33 AM.  Rate 93.  Interpretation: Normal sinus  rhythm, left axis deviation, LVH with Q waves in V1 and V2, delayed R wave progression, slight ST elevation in V1 and V2, no ST depression or T wave inversion. [JG]   0945 When compared with prior EKG on 11/22/2023, no acute changes are present. [JG]   0955 Reviewed chest x-ray in PACS, no pulmonary infiltrates per my read. [JG]   1008 proBNP: 149.0 [DC]   1008 HS Troponin T(!): 38  49 one month ago [DC]   1008 Creatinine(!): 0.66 [DC]   1008 Sodium: 139 [DC]   1008 Potassium: 4.3 [DC]   1008 Magnesium: 2.0 [DC]   1009 XR Chest 1 View  Radiology study independently interpreted by me and my findings are no dense consolidation.   [DC]   1207 Phone call with CCP nurse.  Discussed the patient, relevant history, exam, diagnostics, ED findings/progress, and concerns. They agree to consult.    [JG]   1312 Lactic acid and procalcitonin are negative, reassuring. [JG]   1315 Lactate: 0.9 [DC]   1315 Procalcitonin: 0.13 [DC]   1403 Orthostatic positive, receiving IV fluids. [JG]   1430 Discussed plan for admission with family and patient who are agreeable. [DC]   1533 Discussed case with Dr. Rogers ARMANDO, who will admit the patient. [DC]      ED Course User Index  [DC] Gauri Montague PA  [JG] Aditya Oswald MD             AS OF 15:56 EST VITALS:    BP - 145/69  HR - 90  TEMP - 97.7 °F (36.5 °C) (Tympanic)  O2 SATS - 96%        DIAGNOSIS  Final diagnoses:   Generalized weakness   Unable to ambulate   Bandemia   Orthostatic hypotension         DISPOSITION  ED Disposition       ED Disposition   Decision to Admit    Condition   --    Comment   Level of Care: Telemetry [5]   Diagnosis: Orthostatic dizziness [[id number]]   Admitting Physician: NEY ROGERS [[id number]]   Attending Physician: NEY ROGERS [[id number]]                    Note Disclaimer: At [facility], we believe that sharing information builds trust and better relationships. You are receiving this note because you recently visited [facility]. It is possible you will see  health information before a provider has talked with you about it. This kind of information can be easy to misunderstand. To help you fully understand what it means for your health, we urge you to discuss this note with your provider.         Gauri Montague PA  01/07/24 1552

## 2024-01-07 NOTE — ED PROVIDER NOTES
MD ATTESTATION NOTE  I supervised care provided by the midlevel provider. We have discussed this patient's history, physical exam, and treatment plan. I have reviewed the midlevel provider's note and I agree with the midlevel provider's findings and plan of care.   SHARED VISIT: This visit was performed by BOTH a physician and an APC. The substantive portion of the medical decision making was performed by this attesting physician who made or approved the management plan and takes responsibility for patient management. All studies in the APC note (if performed) were independently interpreted by me.   I have personally had a face to face encounter with the patient.   See my attending note.    PCP: Damian Sanchez MD  Patient Care Team:  Damian Sanchez MD as PCP - General (Family Medicine)  Jr Temo Cortez MD as Surgeon (Cardiothoracic Surgery)  Netta Mayorga Jr., MD as Consulting Physician (Vascular Surgery)  Damian Sanchez MD as Referring Physician (Family Medicine)  Aquiles Lopez MD as Consulting Physician (Hematology and Oncology)  Christy Luther APRN as Nurse Practitioner (Nurse Practitioner)  Damian Sanchez MD as Referring Physician (Family Medicine)     Madhu Santoro is a 78 y.o. male who presents to the ED c/o generalized weakness.  History supplied by patient and patient's family.  Patient was just discharged from skilled nursing facility on Friday, difficulty walking when he got home, legs gave out causing him to fall, no injury occurred, patient had second fall same day.  His chair and scraped his hand, no impact, no loss consciousness.  Patient has been getting progressively weaker to the point that he is now unable to ambulate.  No recent cough or upper respiratory symptoms, no vomiting or diarrhea, no urinary complaints.  Patient denies any fevers.    On exam:  General: NAD.  Head: Small abrasion on left forehead, no crepitus or deformity..  ENT: nares patent, no scleral  icterus  Neck: Supple, trachea midline.  Cardiac: regular rate and rhythm.  Lungs: normal effort.  Abdomen: Soft, NTTP.   Extremities: Moves all extremities well, no peripheral edema  Neuro: alert, MAEW, follows commands  Psych: calm, cooperative  Skin: Warm, dry.    Medical Decision Making:  After the initial H&P, I discussed pertinent information from history and physical exam with patient/family.  Discussed differential diagnosis.  Discussed plan for ED evaluation/work-up/treatment.  All questions answered.  Patient/family is agreeable with plan.    ED Course as of 01/10/24 2140   Sun Jan 07, 2024   0930 My differential diagnosis for generalized weakness includes but is not limited to:  Neuromuscular weakness   CVA  Hemorrhagic stroke  Multiple sclerosis  Amyotrophic Lateral Sclerosis (ALS) (UMN & LMN)  Spinal and bulbar muscular atrophy (Mejia's syndrome)  Spinal cord disease: Infection (Epidural abscess)  Infarction/ischemia  Trauma (Spinal Cord Syndromes)  Inflammation (Transverse Myelitis)  Degenerative (Spinal muscular atrophy)  Tumor  Peripheral nerve disease: Guillain-[place] syndrome  Toxins ([place])  Tick paralysis  Diabetic peripheral neuropathy  NMJ disease: Myasthenia gravis crisis  Botulism  Organophosphate toxicity  Lambert-Eaton myasthenic syndrome  Rhabdomyolysis  Dermatomyositis  Polymyositis  Alcoholic myopathy  Non-neuromuscular weakness   ACS  Arrhythmia/Syncope  Severe infection/Sepsis  Hypoglycemia  Periodic paralysis (electrolyte disturbance, K, Mg, Ca)   Hypokalemic periodic paralysis  Thyrotoxic periodic paralysis  Respiratory failure  Symptomatic Anemia  Severe dehydration  Hypothyroidism  Polypharmacy  Malignancy           [JG]   0943 WBC(!): 13.52 [DC]   0943 Hemoglobin(!): 12.7 [DC]   0945 EKG independently viewed and contemporaneously interpreted by ED physician. Time: 9:33 AM.  Rate 93.  Interpretation: Normal sinus rhythm, left axis deviation, LVH with Q waves in V1 and V2,  delayed R wave progression, slight ST elevation in V1 and V2, no ST depression or T wave inversion. [JG]   0945 When compared with prior EKG on 11/22/2023, no acute changes are present. [JG]   0955 Reviewed chest x-ray in PACS, no pulmonary infiltrates per my read. [JG]   1008 proBNP: 149.0 [DC]   1008 HS Troponin T(!): 38  49 one month ago [DC]   1008 Creatinine(!): 0.66 [DC]   1008 Sodium: 139 [DC]   1008 Potassium: 4.3 [DC]   1008 Magnesium: 2.0 [DC]   1009 XR Chest 1 View  Radiology study independently interpreted by me and my findings are no dense consolidation.   [DC]   1207 Phone call with [facility] nurse.  Discussed the patient, relevant history, exam, diagnostics, ED findings/progress, and concerns. They agree to consult.    [JG]   1312 Lactic acid and procalcitonin are negative, reassuring. [JG]   1315 Lactate: 0.9 [DC]   1315 Procalcitonin: 0.13 [DC]   1403 Orthostatic positive, receiving IV fluids. [JG]   1430 Discussed plan for admission with family and patient who are agreeable. [DC]   1533 Discussed case with Dr. Rogers, [facility], who will admit the patient. [DC]      ED Course User Index  [DC] Gauri Montague PA  [JG] Aditya Oswald MD       Diagnosis  Final diagnoses:   Generalized weakness   Unable to ambulate   Bandemia   Orthostatic hypotension          Aditya Oswald MD  01/10/24 0487

## 2024-01-08 ENCOUNTER — APPOINTMENT (OUTPATIENT)
Dept: GENERAL RADIOLOGY | Facility: HOSPITAL | Age: 79
DRG: 312 | End: 2024-01-08
Payer: MEDICARE

## 2024-01-08 ENCOUNTER — APPOINTMENT (OUTPATIENT)
Dept: CT IMAGING | Facility: HOSPITAL | Age: 79
DRG: 312 | End: 2024-01-08
Payer: MEDICARE

## 2024-01-08 ENCOUNTER — TELEPHONE (OUTPATIENT)
Dept: FAMILY MEDICINE CLINIC | Facility: CLINIC | Age: 79
End: 2024-01-08
Payer: MEDICARE

## 2024-01-08 LAB
ALBUMIN SERPL-MCNC: 2.9 G/DL (ref 3.5–5.2)
ALBUMIN/GLOB SERPL: 0.9 G/DL
ALP SERPL-CCNC: 136 U/L (ref 39–117)
ALT SERPL W P-5'-P-CCNC: 17 U/L (ref 1–41)
ANION GAP SERPL CALCULATED.3IONS-SCNC: 11.3 MMOL/L (ref 5–15)
AST SERPL-CCNC: 30 U/L (ref 1–40)
BASOPHILS # BLD AUTO: 0.03 10*3/MM3 (ref 0–0.2)
BASOPHILS NFR BLD AUTO: 0.3 % (ref 0–1.5)
BILIRUB SERPL-MCNC: 0.3 MG/DL (ref 0–1.2)
BUN SERPL-MCNC: 13 MG/DL (ref 8–23)
BUN/CREAT SERPL: 22 (ref 7–25)
CALCIUM SPEC-SCNC: 8.8 MG/DL (ref 8.6–10.5)
CHLORIDE SERPL-SCNC: 105 MMOL/L (ref 98–107)
CO2 SERPL-SCNC: 22.7 MMOL/L (ref 22–29)
CREAT SERPL-MCNC: 0.59 MG/DL (ref 0.76–1.27)
DEPRECATED RDW RBC AUTO: 54.9 FL (ref 37–54)
EGFRCR SERPLBLD CKD-EPI 2021: 98.7 ML/MIN/1.73
EOSINOPHIL # BLD AUTO: 0.33 10*3/MM3 (ref 0–0.4)
EOSINOPHIL NFR BLD AUTO: 3.3 % (ref 0.3–6.2)
ERYTHROCYTE [DISTWIDTH] IN BLOOD BY AUTOMATED COUNT: 15.8 % (ref 12.3–15.4)
GLOBULIN UR ELPH-MCNC: 3.4 GM/DL
GLUCOSE BLDC GLUCOMTR-MCNC: 105 MG/DL (ref 70–130)
GLUCOSE BLDC GLUCOMTR-MCNC: 107 MG/DL (ref 70–130)
GLUCOSE BLDC GLUCOMTR-MCNC: 113 MG/DL (ref 70–130)
GLUCOSE BLDC GLUCOMTR-MCNC: 139 MG/DL (ref 70–130)
GLUCOSE BLDC GLUCOMTR-MCNC: 96 MG/DL (ref 70–130)
GLUCOSE SERPL-MCNC: 123 MG/DL (ref 65–99)
HCT VFR BLD AUTO: 33.6 % (ref 37.5–51)
HGB BLD-MCNC: 10.4 G/DL (ref 13–17.7)
IMM GRANULOCYTES # BLD AUTO: 0.26 10*3/MM3 (ref 0–0.05)
IMM GRANULOCYTES NFR BLD AUTO: 2.6 % (ref 0–0.5)
LYMPHOCYTES # BLD AUTO: 1.11 10*3/MM3 (ref 0.7–3.1)
LYMPHOCYTES NFR BLD AUTO: 10.9 % (ref 19.6–45.3)
MCH RBC QN AUTO: 29.5 PG (ref 26.6–33)
MCHC RBC AUTO-ENTMCNC: 31 G/DL (ref 31.5–35.7)
MCV RBC AUTO: 95.5 FL (ref 79–97)
MONOCYTES # BLD AUTO: 1.45 10*3/MM3 (ref 0.1–0.9)
MONOCYTES NFR BLD AUTO: 14.3 % (ref 5–12)
NEUTROPHILS NFR BLD AUTO: 6.97 10*3/MM3 (ref 1.7–7)
NEUTROPHILS NFR BLD AUTO: 68.6 % (ref 42.7–76)
NRBC BLD AUTO-RTO: 0.1 /100 WBC (ref 0–0.2)
PLATELET # BLD AUTO: 455 10*3/MM3 (ref 140–450)
PMV BLD AUTO: 10.5 FL (ref 6–12)
POTASSIUM SERPL-SCNC: 3.9 MMOL/L (ref 3.5–5.2)
PROT SERPL-MCNC: 6.3 G/DL (ref 6–8.5)
RBC # BLD AUTO: 3.52 10*6/MM3 (ref 4.14–5.8)
SODIUM SERPL-SCNC: 139 MMOL/L (ref 136–145)
WBC NRBC COR # BLD AUTO: 10.15 10*3/MM3 (ref 3.4–10.8)

## 2024-01-08 PROCEDURE — 97162 PT EVAL MOD COMPLEX 30 MIN: CPT

## 2024-01-08 PROCEDURE — 92610 EVALUATE SWALLOWING FUNCTION: CPT

## 2024-01-08 PROCEDURE — 97530 THERAPEUTIC ACTIVITIES: CPT

## 2024-01-08 PROCEDURE — 97166 OT EVAL MOD COMPLEX 45 MIN: CPT

## 2024-01-08 PROCEDURE — G0378 HOSPITAL OBSERVATION PER HR: HCPCS

## 2024-01-08 PROCEDURE — 85025 COMPLETE CBC W/AUTO DIFF WBC: CPT | Performed by: INTERNAL MEDICINE

## 2024-01-08 PROCEDURE — 82948 REAGENT STRIP/BLOOD GLUCOSE: CPT

## 2024-01-08 PROCEDURE — 80053 COMPREHEN METABOLIC PANEL: CPT | Performed by: INTERNAL MEDICINE

## 2024-01-08 PROCEDURE — 72110 X-RAY EXAM L-2 SPINE 4/>VWS: CPT

## 2024-01-08 PROCEDURE — 70450 CT HEAD/BRAIN W/O DYE: CPT

## 2024-01-08 RX ORDER — SODIUM CHLORIDE 9 MG/ML
50 INJECTION, SOLUTION INTRAVENOUS CONTINUOUS
Status: DISCONTINUED | OUTPATIENT
Start: 2024-01-08 | End: 2024-01-09

## 2024-01-08 RX ORDER — HYDROCODONE BITARTRATE AND ACETAMINOPHEN 7.5; 325 MG/1; MG/1
1 TABLET ORAL 3 TIMES DAILY
Status: DISCONTINUED | OUTPATIENT
Start: 2024-01-08 | End: 2024-01-12 | Stop reason: HOSPADM

## 2024-01-08 RX ADMIN — LANSOPRAZOLE 30 MG: 15 TABLET, ORALLY DISINTEGRATING ORAL at 17:26

## 2024-01-08 RX ADMIN — ANORECTAL OINTMENT 1 APPLICATION: 15.7; .44; 24; 20.6 OINTMENT TOPICAL at 10:33

## 2024-01-08 RX ADMIN — Medication 10 ML: at 20:28

## 2024-01-08 RX ADMIN — APIXABAN 2.5 MG: 2.5 TABLET, FILM COATED ORAL at 20:28

## 2024-01-08 RX ADMIN — HYDROCODONE BITARTRATE AND ACETAMINOPHEN 1 TABLET: 7.5; 325 TABLET ORAL at 20:28

## 2024-01-08 RX ADMIN — AMLODIPINE BESYLATE 5 MG: 5 TABLET ORAL at 10:33

## 2024-01-08 RX ADMIN — AMITRIPTYLINE HYDROCHLORIDE 50 MG: 50 TABLET, FILM COATED ORAL at 20:28

## 2024-01-08 RX ADMIN — HYDROCODONE BITARTRATE AND ACETAMINOPHEN 1 TABLET: 7.5; 325 TABLET ORAL at 16:11

## 2024-01-08 RX ADMIN — SUCRALFATE 1 G: 1 TABLET ORAL at 10:33

## 2024-01-08 RX ADMIN — ANORECTAL OINTMENT 1 APPLICATION: 15.7; .44; 24; 20.6 OINTMENT TOPICAL at 20:31

## 2024-01-08 RX ADMIN — SUCRALFATE 1 G: 1 TABLET ORAL at 17:26

## 2024-01-08 RX ADMIN — ROSUVASTATIN CALCIUM 20 MG: 20 TABLET, FILM COATED ORAL at 20:28

## 2024-01-08 RX ADMIN — LANSOPRAZOLE 30 MG: 15 TABLET, ORALLY DISINTEGRATING ORAL at 10:33

## 2024-01-08 RX ADMIN — Medication 10 ML: at 10:34

## 2024-01-08 NOTE — PLAN OF CARE
Goal Outcome Evaluation:  Plan of Care Reviewed With: patient           Outcome Evaluation: Patient is a 78 year old male admitted to [facility] with generalized weakness, dizziness and falls. Patient has hx of CVA and was hme from SNF 2 days prior to this admission. Patient reports he was getting up with assist from son at home, and had experienced increased weakness since dc from SNF. patient presents with generalized weakness, impaired balance, and impaired act tolerance which hinders his ability to fully participate in ADLs, IADLs, and household mobility. Patient will likely need SNF at acute dc if elligible, vs home with 24 hour care and HH.      Anticipated Discharge Disposition (OT): home, home with 24/7 care, home with home health, skilled nursing facility

## 2024-01-08 NOTE — NURSING NOTE
01/08/24 1520   Wound 10/27/23 0820 coccyx Pressure Injury   Placement Date/Time: 10/27/23 0820   Present on Original Admission: No  Location: coccyx  Primary Wound Type: (c) Pressure Injury  Additional Comments: dti   Pressure Injury Stage 2   Base red   Wound Length (cm) 0.8 cm   Wound Width (cm) 0.5 cm   Wound Depth (cm) 0.1 cm   Wound Surface Area (cm^2) 0.4 cm^2   Wound Volume (cm^3) 0.04 cm^3   Drainage Amount none     Wound/ostomy - consult received regarding a wound to coccyx present on admission. Patient with a brief on, incontinent of urine, brief wet, skin cleansed, zinc cream and new brief applied. Daughter and spouse states patient has had an ongoing wound to coccyx/gluteal cleft for several months. Patient has minimal mobility. There is a partial thickness wound combination of pressure and intertrigo, is a difficult area to place any type of dressing and dressing would be easily contaminated with stool/urine incontinence. Recommend to keep patient clean and dry, calmoseptine skin barrier cream, keep patient routinely turned to offload pressure, patient unable to turn self independently. Discussed management of incontinence at home and suggested pull up type incontinence briefs with the use of a male incontinence pad/guard.

## 2024-01-08 NOTE — THERAPY EVALUATION
Patient Name: Madhu Santoro  : 1945    MRN: [id number]                              Today's Date: 2024       Admit Date: 2024    Visit Dx:     ICD-10-CM ICD-9-CM   1. Generalized weakness  R53.1 780.79   2. Unable to ambulate  R26.2 719.7   3. Bandemia  D72.825 288.66   4. Orthostatic hypotension  I95.1 458.0   5. Neuropathy  G62.9 355.9     Patient Active Problem List   Diagnosis    Anxiety    Arthritis    Coronary artery disease due to lipid rich plaque    HLD (hyperlipidemia)    Benign essential hypertension    DDD (degenerative disc disease), lumbosacral    Cerebrovascular accident    Encounter for screening for cardiovascular disorders    Gastroesophageal reflux disease    Hyperlipidemia    Stenosis of carotid artery    Former smoker    History of cardiac catheterization    S/P ablation of atrial flutter    Typical atrial flutter    S/P CABG (coronary artery bypass graft)    Adenomatous polyp of ascending colon    Abdominal bloating    Stroke    PAD (peripheral artery disease)    Acute cholecystitis    Right upper quadrant abdominal pain    Chronic pain of both hips    Chronic bilateral low back pain without sciatica    Sacroiliac joint dysfunction of both sides    Encounter for long-term (current) use of high-risk medication    Lumbar facet arthropathy    Stroke-like symptoms    CVA (cerebral vascular accident)    Personal history of colonic polyps    Arthralgia of multiple joints    Iron deficiency    Thrombocytosis    Left ureteral stone    Obstructive uropathy    Chronic anticoagulation    Facial skin lesion    Iron deficiency anemia    Polycythemia    Neuropathy    Weakness    Pneumonia    Close exposure to COVID-19 virus    Polycythemia vera    Chronic gout without tophus    COVID-19    Cytokine release syndrome, grade 2    Anemia    History of CVA (cerebrovascular accident)    S/P percutaneous endoscopic gastrostomy (PEG) tube placement    Mandel esophagus    Sacral decubitus ulcer     "Oral phase dysphagia    Acute blood loss anemia    Orthostatic dizziness     Past Medical History:   Diagnosis Date    Anxiety     Arthritis     Atrial flutter     Status post cavotricuspid isthmus ablation by Dr. Gustafson on 4/18/17    Mandel esophagus     Benign essential hypertension     CAD (coronary artery disease)     3 vessel CABG 4/11/17 by Dr. Cortez: ROSARIO-prox LAD, SVG-OM1, SVG-OM3    Carotid artery disease     Status post carotid endarterectomy - USG 4/10/17: 50-59% NICHELLE, 1-15% LICA.     Colonic polyp     Cyst of pancreas     DDD (degenerative disc disease), lumbosacral     GERD (gastroesophageal reflux disease)     H/O bone density study 2013    H/O complete eye exam 2014    History of kidney stone     HLD (hyperlipidemia)     Hypertension     Kidney stone     8/22/22    Lipid screening 05/31/2013    Low back pain     [facility]    Screening for prostate cancer 07/07/2015    Skin cancer     nose    Stroke     RESIDUAL--\"BALANCE ISSUES\"     Past Surgical History:   Procedure Laterality Date    CARDIAC CATHETERIZATION N/A 04/10/2017    Procedure: Left Heart Cath;  Surgeon: Marjorie Healy MD;  Location:  [facility];  Service:     CARDIAC CATHETERIZATION N/A 04/10/2017    Procedure: Coronary angiography;  Surgeon: Marjorie Healy MD;  Location:  [facility];  Service:     CARDIAC CATHETERIZATION N/A 04/10/2017    Procedure: Left ventriculography;  Surgeon: Marjorie Healy MD;  Location:  [facility];  Service:     CARDIAC CATHETERIZATION  2011    CARDIAC ELECTROPHYSIOLOGY PROCEDURE N/A 04/18/2017    Procedure: Ablation atrial flutter;  Surgeon: Jose Antonio Gustafson MD;  Location:  [facility];  Service:     CAROTID ENDARTERECTOMY      CHOLECYSTECTOMY      CHOLECYSTECTOMY WITH INTRAOPERATIVE CHOLANGIOGRAM N/A 09/07/2019    Procedure: Laparoscopic cholecystectomy with intraoperative cholangiogram;  Surgeon: Thaddeus" MD Gauri;  Location: [facility] MAIN OR;  Service: General    COLONOSCOPY  01/06/2015    Diverticulosis, one TA    COLONOSCOPY N/A 02/14/2019    tics, NBIH, adenomatous polyp x 2    COLONOSCOPY N/A 11/05/2021    Procedure: COLONOSCOPY TO CECUM AND TERM. ILEUM WITH COLD POLYPECTOMIES;  Surgeon: Everton Abel MD;  Location: [facility] ENDOSCOPY;  Service: Gastroenterology;  Laterality: N/A;  PRE OP - PERS H/O POLYPS  POST OP - COLON POLYPS,, DIVERTICULOSIS, HEMORRHOIDS    CORONARY ARTERY BYPASS GRAFT N/A 04/11/2017    Procedure: AR STERNOTOMY CORONARY ARTERY BYPASS GRAFT TIMES 3 USING LEFT INTERNAL MAMMARY ARTERY AND LEFT GREATER SAPHENOUS VEIN GRAFT PER ENDOSCOPIC VEIN HARVESTING AND PRP ;  Surgeon: Temo Cortez MD;  Location: [facility] MAIN OR;  Service:     ENDOSCOPY  01/06/2015    HH, Ervin's esophagus    ENDOSCOPY N/A 02/14/2019    Z line irregular, HH, Ervin's esophagus    ENDOSCOPY N/A 11/05/2021    Procedure: ESOPHAGOGASTRODUODENOSCOPY WITH BIOPSIES;  Surgeon: Everton Abel MD;  Location: [facility] ENDOSCOPY;  Service: Gastroenterology;  Laterality: N/A;  PRE OP - PERS H/O ERVNI'S  POST OP - IRREG Z LINE    ENDOSCOPY W/ PEG TUBE PLACEMENT N/A 11/6/2023    Procedure: ESOPHAGOGASTRODUODENOSCOPY WITH PERCUTANEOUS ENDOSCOPIC GASTROSTOMY TUBE INSERTION;  Surgeon: Temo Ramsey MD;  Location: [facility] ENDOSCOPY;  Service: General;  Laterality: N/A;  Pre: dysphagia  Post: same    KNEE SURGERY Left     URETEROSCOPY LASER LITHOTRIPSY WITH STENT INSERTION Left 8/23/2022    Procedure: Cysto retrograde with left uretro stent placement;  Surgeon: Damien Oliveira MD;  Location: [facility] MAIN OR;  Service: Urology;  Laterality: Left;    VASECTOMY        General Information       Row Name 01/08/24 120          OT Time and Intention    Document Type evaluation  -     Mode of Treatment occupational therapy;physical therapy;co-treatment  -       Row Name 01/08/24 9062          General Information     Patient Profile Reviewed yes  -     Prior Level of Function independent:;min assist:  min A with self care, transfers  -     Existing Precautions/Restrictions fall  -     Barriers to Rehab medically complex;previous functional deficit  -       Row Name 01/08/24 1203          Living Environment    People in Home spouse  -[facility] Name 01/08/24 1203          Home Main Entrance    Number of Stairs, Main Entrance two  -       Row Name 01/08/24 1203          Cognition    Orientation Status (Cognition) oriented x 4  -[facility] Name 01/08/24 1203          Safety Issues, Functional Mobility    Impairments Affecting Function (Mobility) balance;endurance/activity tolerance;strength  -     Comment, Safety Issues/Impairments (Mobility) PT/OT cotreatment medically appropriate and necessary due to patient acuity level, to maximize therapeutic benefit due to impaired act tolerance, and for safety of patient and staff. Treatment focused on progression of care and goals established in POC.  -               User Key  (r) = Recorded By, (t) = Taken By, (c) = Cosigned By      Initials Name Provider Type     Alissa Gomes OT Occupational Therapist                     Mobility/ADL's       Row Name 01/08/24 1204          Bed Mobility    Bed Mobility supine-sit;sit-supine  -     Supine-Sit [place] (Bed Mobility) verbal cues;nonverbal cues (demo/gesture);moderate assist (50% patient effort)  -     Sit-Supine [place] (Bed Mobility) verbal cues;nonverbal cues (demo/gesture);moderate assist (50% patient effort)  -     Assistive Device (Bed Mobility) bed rails;head of bed elevated  -       Row Name 01/08/24 1204          Sit-Stand Transfer    Sit-Stand [place] (Transfers) verbal cues;nonverbal cues (demo/gesture);minimum assist (75% patient effort);2 person assist  -     Assistive Device (Sit-Stand Transfers) walker, front-wheeled  -       Row Name 01/08/24 1204          Functional Mobility     Patient was able to Ambulate yes  -[facility] Name 01/08/24 1204          Activities of Daily Living    BADL Assessment/Intervention grooming  -[facility] Name 01/08/24 1204          Grooming Assessment/Training    [place] Level (Grooming) grooming skills;oral care regimen;wash face, hands;set up  -     Position (Grooming) edge of bed sitting;unsupported sitting  -               User Key  (r) = Recorded By, (t) = Taken By, (c) = Cosigned By      Initials Name Provider Type     Alissa Gomes OT Occupational Therapist                   Obj/Interventions       [facility] Name 01/08/24 1205          Sensory Assessment (Somatosensory)    Sensory Assessment (Somatosensory) sensation intact  -[facility] Name 01/08/24 1205          Vision Assessment/Intervention    Visual Impairment/Limitations WFL  -[facility] Name 01/08/24 1205          Range of Motion Comprehensive    General Range of Motion bilateral upper extremity ROM [facility]  -[facility] Name 01/08/24 1205          Strength Comprehensive (MMT)    General Manual Muscle Testing (MMT) Assessment upper extremity strength deficits identified  -     Comment, General Manual Muscle Testing (MMT) Assessment BUE 3/5, generalized weakness  -[facility] Name 01/08/24 1205          Motor Skills    Motor Skills functional endurance  -     Functional Endurance Fair  -[facility] Name 01/08/24 1205          Balance    Balance Assessment sitting static balance;sitting dynamic balance;sit to stand dynamic balance;standing static balance;standing dynamic balance  -     Static Sitting Balance standby assist  -     Dynamic Sitting Balance standby assist  -     Position, Sitting Balance unsupported;sitting edge of bed  -     Sit to Stand Dynamic Balance minimal assist  -     Static Standing Balance contact guard;verbal cues;non-verbal cues (demo/gesture)  -     Dynamic Standing Balance contact guard;verbal cues;non-verbal cues (demo/gesture)  -      Position/Device Used, Standing Balance walker, front-wheeled  -     Balance Interventions sitting;standing;occupation based/functional task  -               User Key  (r) = Recorded By, (t) = Taken By, (c) = Cosigned By      Initials Name Provider Type     Alissa Gomes, OT Occupational Therapist                   Goals/Plan       Row Name 01/08/24 1209          Bed Mobility Goal 1 (OT)    Activity/Assistive Device (Bed Mobility Goal 1, OT) bed mobility activities, all  -     [place] Level/Cues Needed (Bed Mobility Goal 1, OT) modified [facility]     Time Frame (Bed Mobility Goal 1, OT) short term goal (STG);2 weeks  -[facility] Name 01/08/24 1209          Transfer Goal 1 (OT)    Activity/Assistive Device (Transfer Goal 1, OT) transfers, all  -     [place] Level/Cues Needed (Transfer Goal 1, OT) modified [facility]     Time Frame (Transfer Goal 1, OT) short term goal (STG);2 weeks  -     Progress/Outcome (Transfer Goal 1, OT) new goal  -[facility] Name 01/08/24 1209          Dressing Goal 1 (OT)    Activity/Device (Dressing Goal 1, OT) dressing skills, all;upper body dressing;lower body dressing  -     [place]/Cues Needed (Dressing Goal 1, OT) modified [facility]     Time Frame (Dressing Goal 1, OT) short term goal (STG);2 weeks  -     Progress/Outcome (Dressing Goal 1, OT) new goal  -[facility] Name 01/08/24 1209          Toileting Goal 1 (OT)    Activity/Device (Toileting Goal 1, OT) toileting skills, all  -     [place] Level/Cues Needed (Toileting Goal 1, OT) modified [facility]     Time Frame (Toileting Goal 1, OT) short term goal (STG);2 weeks  -     Progress/Outcome (Toileting Goal 1, OT) new goal  -       Row Name 01/08/24 1209          Grooming Goal 1 (OT)    Activity/Device (Grooming Goal 1, OT) grooming skills, all  -     [place] (Grooming Goal 1, OT) modified [facility]     Time Frame (Grooming Goal 1, OT) short term  goal (STG);2 weeks  -     Progress/Outcome (Grooming Goal 1, OT) new goal  -       Row Name 01/08/24 1209          Therapy Assessment/Plan (OT)    Planned Therapy Interventions (OT) activity tolerance training;BADL retraining;functional balance retraining;occupation/activity based interventions;patient/caregiver education/training;strengthening exercise;transfer/mobility retraining  -               User Key  (r) = Recorded By, (t) = Taken By, (c) = Cosigned By      Initials Name Provider Type     Alissa Gomes, MEHDI Occupational Therapist                   Clinical Impression       Row Name 01/08/24 1206          Pain Assessment    Pretreatment Pain Rating 0/10 - no pain  -     Posttreatment Pain Rating 0/10 - no pain  -       Row Name 01/08/24 1206          Plan of Care Review    Plan of Care Reviewed With patient  -     Outcome Evaluation Patient is a 78 year old male admitted to [facility] with generalized weakness, dizziness and falls. Patient has hx of CVA and was hme from SNF 2 days prior to this admission. Patient reports he was getting up with assist from son at home, and had experienced increased weakness since dc from SNF. patient presents with generalized weakness, impaired balance, and impaired act tolerance which hinders his ability to fully participate in ADLs, IADLs, and household mobility. Patient will likely need SNF at acute dc if elligible, vs home with 24 hour care and .  -       Row Name 01/08/24 1206          Therapy Assessment/Plan (OT)    Rehab Potential (OT) good, to achieve stated therapy goals  -     Criteria for Skilled Therapeutic Interventions Met (OT) yes;meets criteria;skilled treatment is necessary  -     Therapy Frequency (OT) 5 times/wk  -       Row Name 01/08/24 1206          Therapy Plan Review/Discharge Plan (OT)    Anticipated Discharge Disposition (OT) home;home with 24/7 care;home with home health;skilled nursing facility  -       Row Name 01/08/24 1206           Positioning and Restraints    Pre-Treatment Position in bed  -LH     Post Treatment Position bed  -LH     In Bed fowlers;call light within reach;encouraged to call for assist;exit alarm on  -               User Key  (r) = Recorded By, (t) = Taken By, (c) = Cosigned By      Initials Name Provider Type     Alissa Gomes OT Occupational Therapist                   Outcome Measures       Row Name 01/08/24 1209          How much help from another is currently needed...    Putting on and taking off regular lower body clothing? 2  -LH     Bathing (including washing, rinsing, and drying) 2  -LH     Toileting (which includes using toilet bed pan or urinal) 2  -LH     Putting on and taking off regular upper body clothing 3  -LH     Taking care of personal grooming (such as brushing teeth) 3  -LH     Eating meals 4  -     AM-PAC 6 Clicks Score (OT) 16  -       Row Name 01/08/24 1010          How much help from another person do you currently need...    Turning from your back to your side while in flat bed without using bedrails? 2  -CH     Moving from lying on back to sitting on the side of a flat bed without bedrails? 2  -CH     Moving to and from a bed to a chair (including a wheelchair)? 3  -CH     Standing up from a chair using your arms (e.g., wheelchair, bedside chair)? 3  -CH     Climbing 3-5 steps with a railing? 1  -CH     To walk in hospital room? 2  -CH     AM-PAC 6 Clicks Score (PT) 13  -CH     Highest Level of Mobility Goal 4 --> Transfer to chair/commode  -       Row Name 01/08/24 1209 01/08/24 1010       Functional Assessment    Outcome Measure Options AM-PAC 6 Clicks Daily Activity (OT)  - AM-PAC 6 Clicks Basic Mobility (PT)  -              User Key  (r) = Recorded By, (t) = Taken By, (c) = Cosigned By      Initials Name Provider Type     Blanca Elkins, PT Physical Therapist     Alissa Gomes OT Occupational Therapist                    Occupational Therapy Education       Title: PT OT  SLP Therapies (Done)       Topic: Occupational Therapy (Done)       Point: ADL training (Done)       Description:   Instruct learner(s) on proper safety adaptation and remediation techniques during self care or transfers.   Instruct in proper use of assistive devices.                  Learning Progress Summary             Patient Acceptance, E,TB, VU by  at 1/8/2024 1210    Comment: Instructed in role of OT, discharge planning, home safety, fall prevention, energy conservation and work simplification strategies                         Point: Home exercise program (Done)       Description:   Instruct learner(s) on appropriate technique for monitoring, assisting and/or progressing therapeutic exercises/activities.                  Learning Progress Summary             Patient Acceptance, E,TB, VU by  at 1/8/2024 1210    Comment: Instructed in role of OT, discharge planning, home safety, fall prevention, energy conservation and work simplification strategies                         Point: Precautions (Done)       Description:   Instruct learner(s) on prescribed precautions during self-care and functional transfers.                  Learning Progress Summary             Patient Acceptance, E,TB, VU by  at 1/8/2024 1210    Comment: Instructed in role of OT, discharge planning, home safety, fall prevention, energy conservation and work simplification strategies                         Point: Body mechanics (Done)       Description:   Instruct learner(s) on proper positioning and spine alignment during self-care, functional mobility activities and/or exercises.                  Learning Progress Summary             Patient Acceptance, E,TB, VU by  at 1/8/2024 1210    Comment: Instructed in role of OT, discharge planning, home safety, fall prevention, energy conservation and work simplification strategies                                         User Key       Initials Effective Dates Name Provider Type Discipline      08/31/23 -  Alissa Gomes, MEHDI Occupational Therapist OT                  OT Recommendation and Plan  Planned Therapy Interventions (OT): activity tolerance training, BADL retraining, functional balance retraining, occupation/activity based interventions, patient/caregiver education/training, strengthening exercise, transfer/mobility retraining  Therapy Frequency (OT): 5 times/wk  Plan of Care Review  Plan of Care Reviewed With: patient  Outcome Evaluation: Patient is a 78 year old male admitted to [facility] with generalized weakness, dizziness and falls. Patient has hx of CVA and was hme from SNF 2 days prior to this admission. Patient reports he was getting up with assist from son at home, and had experienced increased weakness since dc from SNF. patient presents with generalized weakness, impaired balance, and impaired act tolerance which hinders his ability to fully participate in ADLs, IADLs, and household mobility. Patient will likely need SNF at acute dc if elligible, vs home with 24 hour care and HH.     Time Calculation:   Evaluation Complexity (OT)  Review Occupational Profile/Medical/Therapy History Complexity: expanded/moderate complexity  Assessment, Occupational Performance/Identification of Deficit Complexity: 3-5 performance deficits  Clinical Decision Making Complexity (OT): detailed assessment/moderate complexity  Overall Complexity of Evaluation (OT): moderate complexity     Time Calculation- OT       Row Name 01/08/24 1210             Time Calculation- OT    OT Start Time 0934  -      OT Stop Time 0951  -      OT Time Calculation (min) 17 min  -      OT Non-Billable Time (min) 17 min  -      OT Received On 01/08/24  -      OT - Next Appointment 01/09/24  -      OT Goal Re-Cert Due Date 01/22/24  -         Untimed Charges    OT Eval/Re-eval Minutes 17  -         Total Minutes    Untimed Charges Total Minutes 17  -       Total Minutes 17  -                User Key  (r) = Recorded By,  (t) = Taken By, (c) = Cosigned By      Initials Name Provider Type     Alissa Gomes, MEHDI Occupational Therapist                  Therapy Charges for Today       Code Description Service Date Service Provider Modifiers Qty    [id number]  OT EVAL MOD COMPLEXITY 3 1/8/2024 Alissa Gomes OT GO 1                 Alissa Gomes OT  1/8/2024

## 2024-01-08 NOTE — PLAN OF CARE
Goal Outcome Evaluation:  Plan of Care Reviewed With: patient        Progress: no change       Gave patient care as per orders. Bolus tube feedings as ordered. No complaints voiced. Patient turned every 2 hours. Orthostatics completed as ordered. WCTM

## 2024-01-08 NOTE — PLAN OF CARE
Problem: Aspiration (Enteral Nutrition)  Goal: Absence of Aspiration Signs and Symptoms  Outcome: Ongoing, Progressing     Problem: Device-Related Complication Risk (Enteral Nutrition)  Goal: Safe, Effective Therapy Delivery  Outcome: Ongoing, Progressing     Problem: Feeding Intolerance (Enteral Nutrition)  Goal: Feeding Tolerance  Outcome: Ongoing, Progressing   Goal Outcome Evaluation:      TF's per MD order via PEG  RD to follow

## 2024-01-08 NOTE — CASE MANAGEMENT/SOCIAL WORK
Discharge Planning Assessment  [facility]     Patient Name: Madhu Santoro  MRN: [id number]  Today's Date: 1/8/2024    Admit Date: 1/7/2024    Plan: Home wiht wife and continued services through [facility] versus [facility] if SNF is needed   Discharge Needs Assessment       Row Name 01/08/24 1458       Living Environment    Name(s) of People in Home alma delia Cox    Current Living Arrangements home    Duration at Residence 25 Y    Potentially Unsafe Housing Conditions none    Primary Care Provided by spouse/significant other    Provides Primary Care For no one, unable/limited ability to care for self    Caregiving Concerns taking care of self    Family Caregiver if Needed spouse    Family Caregiver Names Brooke, wife    Quality of Family Relationships helpful;involved;supportive    Able to Return to Prior Arrangements --  unsure at this time       Resource/Environmental Concerns    Resource/Environmental Concerns none    Transportation Concerns none       Food Insecurity    Within the past 12 months, you worried that your food would run out before you got the money to buy more. Never true    Within the past 12 months, the food you bought just didn't last and you didn't have money to get more. Never true       Transition Planning    Patient/Family Anticipates Transition to home with family;home with help/services;inpatient rehabilitation facility    Patient/Family Anticipated Services at Transition ;durable medical equipment;home health care;skilled nursing    Transportation Anticipated family or friend will provide  wife provides transportation       Discharge Needs Assessment    Equipment Currently Used at Home commode;walker, rolling;shower chair;wheelchair  G-tube supplies    Concerns to be Addressed adjustment to diagnosis/illness;discharge planning    Concerns Comments wife would like a hospital bed, continued services through [facility] versus [facility]    Anticipated Changes Related to Illness inability to  care for self    Equipment Needed After Discharge hospital bed    Outpatient/Agency/Support Group Needs homecare agency    Discharge Facility/Level of Care Needs home with home health;nursing facility, skilled    Patient's Choice of Community Agency(s) Patient is current with [facility]    Current Discharge Risk dependent with mobility/activities of daily living    Discharge Coordination/Progress Per patient's wife Brooke she would like to take patient back home if able with continued services through [facility] and would consider [facility] for SNF if needed.      Row Name 01/08/24 1457       Living Environment    People in Home spouse                   Discharge Plan       Row Name 01/08/24 1504       Plan    Plan Home with wife and continued services through [facility] versus [facility] if SNF is needed    Patient/Family in Agreement with Plan yes    Plan Comments CM called patient's wife Brooke via phone in room and introduced self and role of CM and wife is agreeable to discuss discharge disposition. Wife confirms that the info on the face sheet is correct and that he see's Dr. Damian Sanchez as PCP. She states he normally uses [facility] in [place] and normally has no issues paying for his med's and she picks them up. She also states patient does have a living will and it is on file here. Wife states patient lives with her in a single story house with 1/2 basement and since being home from the NH on Friday he is limited on mobility due to weakness. She states prior to his hospitalization in October of 23 patient was independent with his ADL's and drove. She also states he was admitted for hospitalized between 10/23/23 through 11/9/23 and then went to [facility] for rehab. She states patient was discharged from the facility on Friday, 1/5/2024 and has been weak at home and unable to do anything for him self. She states [facility] was arranged to follow and a nurse did bring  out the supplies for his tube feeds and they are given via bolus. She states he currently has a rolling walker, BSC, wheelchair, and railings on the bed and would like a hospital bed at discharge. Wife states her plan is for patient to return back home if able and continued services through [facility]. CM asked if she would consider [facility] again if needed for SNF and she is agreeable. Wife had no other questions or concerns regarding discharge plans. CM called and informed Jocelyn with [facility] of patient's admission.CM will follow.                  Continued Care and Services - Admitted Since 1/7/2024       Home Medical Care       Service Provider Request Status Selected Services Address Phone Fax Patient Preferred     [facility] - Request Sent N/A [address] [facility] [address] [address] [phone] --                  Selected Continued Care - Episodes Includes continued care and service providers with selected services from the active episodes listed below      Oncology- External Fill Episode start date: 5/16/2023 (Paused)   There are no active outsourced providers for this episode.                 Selected Continued Care - Prior Encounters Includes continued care and service providers with selected services from prior encounters from 10/9/2023 to 1/8/2024      Discharged on 11/29/2023 Admission date: 11/22/2023 - Discharge disposition: Skilled Nursing Facility (DC - External)      Destination       Service Provider Selected Services Address Phone Fax Patient Preferred    [facility] [address] [address] [phone] --                      Discharged on 11/9/2023 Admission date: 10/23/2023 - Discharge disposition: Skilled Nursing Facility (DC - External)      Destination       Service Provider Selected Services Address Phone Fax Patient Preferred    [facility] [address]  [facility] [address] [phone] --                          Expected Discharge Date and Time       Expected Discharge Date Expected Discharge Time    Jan 9, 2024            Demographic Summary    No documentation.                  Functional Status    No documentation.                  Psychosocial    No documentation.                  Abuse/Neglect    No documentation.                  Legal    No documentation.                  Substance Abuse    No documentation.                  Patient Forms    No documentation.                     Greer Marie RN

## 2024-01-08 NOTE — THERAPY EVALUATION
Acute Care - Speech Language Pathology   Swallow Initial Evaluation [facility]     Patient Name: Madhu Santoro  : 1945  MRN: [id number]  Today's Date: 2024               Admit Date: 2024    Visit Dx:     ICD-10-CM ICD-9-CM   1. Generalized weakness  R53.1 780.79   2. Unable to ambulate  R26.2 719.7   3. Bandemia  D72.825 288.66   4. Orthostatic hypotension  I95.1 458.0   5. Neuropathy  G62.9 355.9     Patient Active Problem List   Diagnosis    Anxiety    Arthritis    Coronary artery disease due to lipid rich plaque    HLD (hyperlipidemia)    Benign essential hypertension    DDD (degenerative disc disease), lumbosacral    Cerebrovascular accident    Encounter for screening for cardiovascular disorders    Gastroesophageal reflux disease    Hyperlipidemia    Stenosis of carotid artery    Former smoker    History of cardiac catheterization    S/P ablation of atrial flutter    Typical atrial flutter    S/P CABG (coronary artery bypass graft)    Adenomatous polyp of ascending colon    Abdominal bloating    Stroke    PAD (peripheral artery disease)    Acute cholecystitis    Right upper quadrant abdominal pain    Chronic pain of both hips    Chronic bilateral low back pain without sciatica    Sacroiliac joint dysfunction of both sides    Encounter for long-term (current) use of high-risk medication    Lumbar facet arthropathy    Stroke-like symptoms    CVA (cerebral vascular accident)    Personal history of colonic polyps    Arthralgia of multiple joints    Iron deficiency    Thrombocytosis    Left ureteral stone    Obstructive uropathy    Chronic anticoagulation    Facial skin lesion    Iron deficiency anemia    Polycythemia    Neuropathy    Weakness    Pneumonia    Close exposure to COVID-19 virus    Polycythemia vera    Chronic gout without tophus    COVID-19    Cytokine release syndrome, grade 2    Anemia    History of CVA (cerebrovascular accident)    S/P percutaneous endoscopic gastrostomy (PEG)  "tube placement    Mandel esophagus    Sacral decubitus ulcer    Oral phase dysphagia    Acute blood loss anemia    Orthostatic dizziness     Past Medical History:   Diagnosis Date    Anxiety     Arthritis     Atrial flutter     Status post cavotricuspid isthmus ablation by Dr. Gustafson on 4/18/17    Mandel esophagus     Benign essential hypertension     CAD (coronary artery disease)     3 vessel CABG 4/11/17 by Dr. Cortez: ROSARIO-prox LAD, SVG-OM1, SVG-OM3    Carotid artery disease     Status post carotid endarterectomy - USG 4/10/17: 50-59% NICHELLE, 1-15% LICA.     Colonic polyp     Cyst of pancreas     DDD (degenerative disc disease), lumbosacral     GERD (gastroesophageal reflux disease)     H/O bone density study 2013    H/O complete eye exam 2014    History of kidney stone     HLD (hyperlipidemia)     Hypertension     Kidney stone     8/22/22    Lipid screening 05/31/2013    Low back pain     [facility]    Screening for prostate cancer 07/07/2015    Skin cancer     nose    Stroke     RESIDUAL--\"BALANCE ISSUES\"     Past Surgical History:   Procedure Laterality Date    CARDIAC CATHETERIZATION N/A 04/10/2017    Procedure: Left Heart Cath;  Surgeon: Marjorie Healy MD;  Location:  [facility];  Service:     CARDIAC CATHETERIZATION N/A 04/10/2017    Procedure: Coronary angiography;  Surgeon: Marjorie Healy MD;  Location:  [facility];  Service:     CARDIAC CATHETERIZATION N/A 04/10/2017    Procedure: Left ventriculography;  Surgeon: Marjorie Healy MD;  Location:  [facility];  Service:     CARDIAC CATHETERIZATION  2011    CARDIAC ELECTROPHYSIOLOGY PROCEDURE N/A 04/18/2017    Procedure: Ablation atrial flutter;  Surgeon: Jose Antonio Gustafson MD;  Location:  [facility];  Service:     CAROTID ENDARTERECTOMY      CHOLECYSTECTOMY      CHOLECYSTECTOMY WITH INTRAOPERATIVE CHOLANGIOGRAM N/A 09/07/2019    Procedure: Laparoscopic " cholecystectomy with intraoperative cholangiogram;  Surgeon: Gauri Travis MD;  Location: [facility] MAIN OR;  Service: General    COLONOSCOPY  01/06/2015    Diverticulosis, one TA    COLONOSCOPY N/A 02/14/2019    tics, NBIH, adenomatous polyp x 2    COLONOSCOPY N/A 11/05/2021    Procedure: COLONOSCOPY TO CECUM AND TERM. ILEUM WITH COLD POLYPECTOMIES;  Surgeon: Everton Abel MD;  [facility];  Service: Gastroenterology;  Laterality: N/A;  PRE OP - PERS H/O POLYPS  POST OP - COLON POLYPS,, DIVERTICULOSIS, HEMORRHOIDS    CORONARY ARTERY BYPASS GRAFT N/A 04/11/2017    Procedure: AR STERNOTOMY CORONARY ARTERY BYPASS GRAFT TIMES 3 USING LEFT INTERNAL MAMMARY ARTERY AND LEFT GREATER SAPHENOUS VEIN GRAFT PER ENDOSCOPIC VEIN HARVESTING AND PRP ;  Surgeon: Temo Cortez MD;  Location: [facility] MAIN OR;  Service:     ENDOSCOPY  01/06/2015    HH, Ervin's esophagus    ENDOSCOPY N/A 02/14/2019    Z line irregular, HH, Ervin's esophagus    ENDOSCOPY N/A 11/05/2021    Procedure: ESOPHAGOGASTRODUODENOSCOPY WITH BIOPSIES;  Surgeon: Everton Abel MD;  [facility];  Service: Gastroenterology;  Laterality: N/A;  PRE OP - PERS H/O ERVIN'S  POST OP - IRREG Z LINE    ENDOSCOPY W/ PEG TUBE PLACEMENT N/A 11/6/2023    Procedure: ESOPHAGOGASTRODUODENOSCOPY WITH PERCUTANEOUS ENDOSCOPIC GASTROSTOMY TUBE INSERTION;  Surgeon: Temo Ramsey MD;  [facility];  Service: General;  Laterality: N/A;  Pre: dysphagia  Post: same    KNEE SURGERY Left     URETEROSCOPY LASER LITHOTRIPSY WITH STENT INSERTION Left 8/23/2022    Procedure: Cysto retrograde with left uretro stent placement;  Surgeon: Damien Oliveira MD;  Location: [facility] MAIN OR;  Service: Urology;  Laterality: Left;    VASECTOMY         SLP Recommendation and Plan  SLP Swallowing Diagnosis: suspected pharyngeal dysphagia (01/08/24 1400)  SLP Diet Recommendation: mechanical ground textures, nectar thick liquids  (01/08/24 1400)  Recommended Precautions and Strategies: upright posture during/after eating, small bites of food and sips of liquid, general aspiration precautions (01/08/24 1400)  SLP Rec. for Method of Medication Administration: meds whole, meds crushed, with puree, with thick liquids (01/08/24 1400)     Monitor for Signs of Aspiration: yes, notify SLP if any concerns (01/08/24 1400)  Recommended Diagnostics: reassess via VFSS ([facility]) (01/08/24 1400)  Swallow Criteria for Skilled Therapeutic Interventions Met: demonstrates skilled criteria (01/08/24 1400)     Rehab Potential/Prognosis, Swallowing: re-evaluate goals as necessary (01/08/24 1400)  Therapy Frequency (Swallow): PRN (01/08/24 1400)  Predicted Duration Therapy Intervention (Days): until discharge (01/08/24 1400)  Oral Care Recommendations: Oral Care BID/PRN (01/08/24 1400)                                      Oral Care Recommendations: Oral Care BID/PRN (01/08/24 1400)    Outcome Evaluation: Clinical swallow evaluation completed. Wife at bedside. PEG placed previous hospitalization. Patient and wife eager for diet initiation stating patient completed mobile VFSS through third party prior to SNF discharge and passed for regular solid diet; SLP unable to locate records. Previous hospitalization in November 2023 recommended NPO with PEG feeds. CXR indicated clear lungs. At bedside, patient presented with wet vocal quality with ice chip, throat clear with thin liquid by spoon, and wet vocal quality/cough with thin liquid by cup. No overt s/s of aspiration with nectar by spoon/cup/straw, puree, or soft/chopped solid. Prolonged munching mastication due to missing teeth. Recommend mechanical soft diet with nectar thick liquids. Meds whole or crushed with puree or nectar thick liquid. Sitting upright, slow rate, small bites/sips. Speech to follow with VFSS to further assess swallow function.      SWALLOW EVALUATION (last 72 hours)       SLP Adult Swallow  Evaluation       Row Name 01/08/24 1400                   Rehab Evaluation    Document Type evaluation  -CR        Subjective Information no complaints  -CR        Patient Observations alert  -CR        Patient Effort adequate  -CR        Symptoms Noted During/After Treatment none  -CR           General Information    Patient Profile Reviewed yes  -CR        Pertinent History Of Current Problem 77 y/o male admitted due to balance issues resulting in recurrent falls. Initial work up noted mildly elevated white blood cell count. PMH includes CVA, dysphagia with PEG placement, recent discharge from rehab facility. VFSS completed 11/3/23 with NPO recs. PEG placed 11/6/23.  -CR        Current Method of Nutrition NPO  -CR        Precautions/Limitations, Vision WFL;for purposes of eval  -CR        Precautions/Limitations, Hearing WFL;for purposes of eval  -CR        Prior Level of Function-Swallowing --  Regular/thin per patient and wife. Discharged 11/2023 from [facility] NPO with PEG.  -CR        Plans/Goals Discussed with patient and family;agreed upon  -CR        Barriers to Rehab none identified  -CR           Pain    Additional Documentation Pain Scale: Numbers Pre/Post-Treatment (Group)  -CR           Pain Scale: Numbers Pre/Post-Treatment    Pretreatment Pain Rating 0/10 - no pain  -CR        Posttreatment Pain Rating 0/10 - no pain  -CR           Oral Motor Structure and Function    Dentition Assessment missing teeth  -CR        Secretion Management WNL/WFL  -CR        Mucosal Quality moist, healthy  -CR           Clinical Swallow Eval    Clinical Swallow Evaluation Summary Clinical swallow evaluation completed. Wife at bedside. PEG placed previous hospitalization. Patient and wife eager for diet initiation stating patient completed mobile VFSS through third party prior to SNF discharge and passed for regular solid diet; SLP unable to locate records. Previous hospitalization in November 2023 recommended NPO with PEG  feeds. CXR indicated clear lungs. At bedside, patient presented with wet vocal quality with ice chip, throat clear with thin liquid by spoon, and wet vocal quality/cough with thin liquid by cup. No overt s/s of aspiration with nectar by spoon/cup/straw, puree, or soft/chopped solid. Prolonged munching mastication due to missing teeth. Recommend mechanical soft diet with nectar thick liquids. Meds whole or crushed with puree or nectar thick liquid. Sitting upright, slow rate, small bites/sips. Speech to follow with VFSS to further assess swallow function.  -CR           SLP Evaluation Clinical Impression    SLP Swallowing Diagnosis suspected pharyngeal dysphagia  -CR        Functional Impact risk of aspiration/pneumonia  -CR        Rehab Potential/Prognosis, Swallowing re-evaluate goals as necessary  -CR        Swallow Criteria for Skilled Therapeutic Interventions Met demonstrates skilled criteria  -CR           Recommendations    Therapy Frequency (Swallow) PRN  -CR        Predicted Duration Therapy Intervention (Days) until discharge  -CR        SLP Diet Recommendation mechanical ground textures;nectar thick liquids  -CR        Recommended Diagnostics reassess via VFSS ([facility])  -CR        Recommended Precautions and Strategies upright posture during/after eating;small bites of food and sips of liquid;general aspiration precautions  -CR        Oral Care Recommendations Oral Care BID/PRN  -CR        SLP Rec. for Method of Medication Administration meds whole;meds crushed;with puree;with thick liquids  -CR        Monitor for Signs of Aspiration yes;notify SLP if any concerns  -CR           Swallow Goals (SLP)    Swallow LTGs Patient will demonstrate functional swallow for  -CR           (LTG) Patient will demonstrate functional swallow for    Diet Texture (Demonstrate functional swallow) regular textures  -CR        Liquid viscosity (Demonstrate functional swallow) thin liquids  -CR        [place] (Demonstrate  functional swallow) independently (over 90% accuracy)  -CR        Time Frame (Demonstrate functional swallow) by discharge  -CR        Progress/Outcomes (Demonstrate functional swallow) new goal  -CR                  User Key  (r) = Recorded By, (t) = Taken By, (c) = Cosigned By      Initials Name Effective Dates    Adry Lentz SLP 08/28/23 -                     EDUCATION  The patient has been educated in the following areas:   Dysphagia (Swallowing Impairment).        SLP GOALS       Row Name 01/08/24 1400             (LTG) Patient will demonstrate functional swallow for    Diet Texture (Demonstrate functional swallow) regular textures  -CR      Liquid viscosity (Demonstrate functional swallow) thin liquids  -CR      [place] (Demonstrate functional swallow) independently (over 90% accuracy)  -CR      Time Frame (Demonstrate functional swallow) by discharge  -CR      Progress/Outcomes (Demonstrate functional swallow) new goal  -CR                User Key  (r) = Recorded By, (t) = Taken By, (c) = Cosigned By      Initials Name Provider Type    CR Adry Moise SLP Speech and Language Pathologist                       Time Calculation:    Time Calculation- SLP       Row Name 01/08/24 1511             Time Calculation- SLP    SLP Start Time 1415  -CR      SLP Received On 01/08/24  -CR         Untimed Charges    85017-TH Eval Oral Pharyng Swallow Minutes 45  -CR         Total Minutes    Untimed Charges Total Minutes 45  -CR       Total Minutes 45  -CR                User Key  (r) = Recorded By, (t) = Taken By, (c) = Cosigned By      Initials Name Provider Type    Adry Lentz SLP Speech and Language Pathologist                    Therapy Charges for Today       Code Description Service Date Service Provider Modifiers Qty    [id number] HC ST EVAL ORAL PHARYNG SWALLOW 3 1/8/2024 Adry Moise SLP GN 1                 GONZÁLEZ Camilo  1/8/2024

## 2024-01-08 NOTE — PLAN OF CARE
Goal Outcome Evaluation:  Plan of Care Reviewed With: patient           Outcome Evaluation: Pt is a 79 yo M who was admitted with generalized weakness, dizziness, and falls. Pt has a history of CVA and was home from SNF for 2 days prior to this admit. Pt reports he was getting up with assist from his son at home but had experienced increased weakness since DC from SNF. Pt presnts to PT with impaired functional mobility and gait secondary to generalized weakness, impaired balance, and decreased activity tolerance. Pt may benefit from skilled PT to address strength, mobility, and gait.      Anticipated Discharge Disposition (PT): skilled nursing facility (pending progress)

## 2024-01-08 NOTE — CONSULTS
"Nutrition Services    Patient Name:  Madhu Santoro  YOB: 1945  MRN: [id number]  Admit Date:  1/7/2024    Assessment Date:  01/08/24    Summary: Nutrition consult for TF assessment  This is a 79 yo male admitted for weakness. He has a hx of stroke and PEG placement due to dysphagia  Labs: glu 123  Meds: catafate, lansoprozole  GI: 1/6    Plan/Recommendation  Continuous feeds with IsoSource 1.5 at 20ml/hr and work up to goal of  60ml/hr  (Note limited carton supplies of Tube feed products available)  Water flushes 13xoa1dt, while IVF's at 100ml/hr  RD to follow        CLINICAL NUTRITION ASSESSMENT      Reason for Assessment Physician Consult, TF Assessment      Diagnosis/Problem   weakness   Medical/Surgical History Past Medical History:   Diagnosis Date    Anxiety     Arthritis     Atrial flutter     Status post cavotricuspid isthmus ablation by Dr. Gustafson on 4/18/17    Mandel esophagus     Benign essential hypertension     CAD (coronary artery disease)     3 vessel CABG 4/11/17 by Dr. Cortez: ROSARIO-prox LAD, SVG-OM1, SVG-OM3    Carotid artery disease     Status post carotid endarterectomy - USG 4/10/17: 50-59% NICHELLE, 1-15% LICA.     Colonic polyp     Cyst of pancreas     DDD (degenerative disc disease), lumbosacral     GERD (gastroesophageal reflux disease)     H/O bone density study 2013    H/O complete eye exam 2014    History of kidney stone     HLD (hyperlipidemia)     Hypertension     Kidney stone     8/22/22    Lipid screening 05/31/2013    Low back pain     [facility]    Screening for prostate cancer 07/07/2015    Skin cancer     nose    Stroke     RESIDUAL--\"BALANCE ISSUES\"       Past Surgical History:   Procedure Laterality Date    CARDIAC CATHETERIZATION N/A 04/10/2017    Procedure: Left Heart Cath;  Surgeon: Marjorie Healy MD;  Location: [facility] CATH INVASIVE LOCATION;  Service:     CARDIAC CATHETERIZATION N/A 04/10/2017    Procedure: Coronary angiography;  " Surgeon: Marjorie Healy MD;  [place] [facility] [place] [place];  Service:     CARDIAC CATHETERIZATION N/A 04/10/2017    Procedure: Left ventriculography;  Surgeon: Marjorie Healy MD;  [facility];  Service:     CARDIAC CATHETERIZATION  2011    CARDIAC ELECTROPHYSIOLOGY PROCEDURE N/A 04/18/2017    Procedure: Ablation atrial flutter;  Surgeon: Jose Antonio Gustafson MD;  Location: [facility] CATH INVASIVE LOCATION;  Service:     CAROTID ENDARTERECTOMY      CHOLECYSTECTOMY      CHOLECYSTECTOMY WITH INTRAOPERATIVE CHOLANGIOGRAM N/A 09/07/2019    Procedure: Laparoscopic cholecystectomy with intraoperative cholangiogram;  Surgeon: Gauri Travis MD;  Location: [facility] MAIN OR;  Service: General    COLONOSCOPY  01/06/2015    Diverticulosis, one TA    COLONOSCOPY N/A 02/14/2019    tics, NBIH, adenomatous polyp x 2    COLONOSCOPY N/A 11/05/2021    Procedure: COLONOSCOPY TO CECUM AND TERM. ILEUM WITH COLD POLYPECTOMIES;  Surgeon: Everton Abel MD;  Location: [facility] ENDOSCOPY;  Service: Gastroenterology;  Laterality: N/A;  PRE OP - PERS H/O POLYPS  POST OP - COLON POLYPS,, DIVERTICULOSIS, HEMORRHOIDS    CORONARY ARTERY BYPASS GRAFT N/A 04/11/2017    Procedure: AR STERNOTOMY CORONARY ARTERY BYPASS GRAFT TIMES 3 USING LEFT INTERNAL MAMMARY ARTERY AND LEFT GREATER SAPHENOUS VEIN GRAFT PER ENDOSCOPIC VEIN HARVESTING AND PRP ;  Surgeon: Temo Cortez MD;  Location: [facility];  Service:     ENDOSCOPY  01/06/2015    HH, Ervin's esophagus    ENDOSCOPY N/A 02/14/2019    Z line irregular, HH, Ervin's esophagus    ENDOSCOPY N/A 11/05/2021    Procedure: ESOPHAGOGASTRODUODENOSCOPY WITH BIOPSIES;  Surgeon: Evreton Abel MD;  Location: [facility] ENDOSCOPY;  Service: Gastroenterology;  Laterality: N/A;  PRE OP - PERS H/O ERVIN'S  POST OP - IRREG Z LINE    ENDOSCOPY W/ PEG TUBE PLACEMENT N/A 11/6/2023    Procedure: ESOPHAGOGASTRODUODENOSCOPY WITH PERCUTANEOUS ENDOSCOPIC GASTROSTOMY TUBE  "INSERTION;  Surgeon: Temo Ramsey MD;  Location: [facility] ENDOSCOPY;  Service: General;  Laterality: N/A;  Pre: dysphagia  Post: same    KNEE SURGERY Left     URETEROSCOPY LASER LITHOTRIPSY WITH STENT INSERTION Left 8/23/2022    Procedure: Cysto retrograde with left uretro stent placement;  Surgeon: Damien Oliveira MD;  Location: [facility] MAIN OR;  Service: Urology;  Laterality: Left;    VASECTOMY          Anthropometrics        Current Height  Current Weight  BMI kg/m2 Height: 172.7 cm (67.99\")  Weight: 75.3 kg (165 lb 14.6 oz) (01/07/24 1700)  Body mass index is 25.23 kg/m².   Adjusted BMI (if applicable)    BMI Category Overweight (25 - 29.9)   Ideal Body Weight (IBW) 150lb   Usual Body Weight (UBW) 160-170's   Weight Trend Loss, Gain   Weight History Wt Readings from Last 30 Encounters:   01/07/24 1700 75.3 kg (165 lb 14.6 oz)   01/07/24 0854 74.4 kg (164 lb)   11/22/23 1002 73.9 kg (163 lb)   11/09/23 0640 77.7 kg (171 lb 4.8 oz)   11/08/23 0505 66.3 kg (146 lb 2.6 oz)   11/07/23 0647 65.4 kg (144 lb 2.9 oz)   11/06/23 0600 76 kg (167 lb 8.8 oz)   11/05/23 0700 73.7 kg (162 lb 7.7 oz)   11/04/23 0500 77.4 kg (170 lb 10.2 oz)   11/03/23 0500 65.4 kg (144 lb 2.9 oz)   11/02/23 1501 68.9 kg (152 lb)   11/02/23 0556 69.2 kg (152 lb 8.9 oz)   11/02/23 0554 80 kg (176 lb 5.9 oz)   11/01/23 0339 68.8 kg (151 lb 10.8 oz)   10/31/23 0128 67 kg (147 lb 11.3 oz)   10/29/23 0309 69.2 kg (152 lb 8.9 oz)   10/28/23 0454 69.7 kg (153 lb 10.6 oz)   10/27/23 0417 72.9 kg (160 lb 11.5 oz)   10/23/23 0536 78.8 kg (173 lb 12.8 oz)   10/20/23 1346 77.8 kg (171 lb 8 oz)   10/12/23 1148 79.3 kg (174 lb 12.8 oz)   10/03/23 1312 77.1 kg (170 lb)   09/21/23 1142 77.7 kg (171 lb 3.2 oz)   08/14/23 1144 76.4 kg (168 lb 6.4 oz)   07/11/23 1131 76.7 kg (169 lb)   06/30/23 1312 74.7 kg (164 lb 11.2 oz)   06/15/23 1300 75 kg (165 lb 4.8 oz)   06/12/23 1105 74.6 kg (164 lb 6.4 oz)   06/01/23 1401 74.6 kg (164 lb 8 oz)   05/18/23 " 0801 77.1 kg (169 lb 14.4 oz)   05/15/23 1356 75.3 kg (166 lb 1.6 oz)   05/01/23 1446 76.9 kg (169 lb 9.6 oz)   04/24/23 1458 77.6 kg (171 lb)   04/18/23 1546 78.5 kg (173 lb)   04/17/23 1505 78 kg (172 lb)   04/06/23 0925 78 kg (172 lb)   04/04/23 1054 77.6 kg (171 lb)   03/23/23 0837 82.1 kg (181 lb)   02/27/23 1319 81.6 kg (180 lb)   02/19/23 1220 79.4 kg (175 lb)   02/08/23 0757 78.9 kg (174 lb)   02/06/23 1051 79.1 kg (174 lb 6.4 oz)   01/18/23 1334 79.6 kg (175 lb 8 oz)   01/12/23 1305 78.9 kg (174 lb)   01/09/23 0729 78 kg (172 lb)   01/05/23 0939 78.9 kg (174 lb)      --  Labs       Pertinent Labs    Results from last 7 days   Lab Units 01/08/24  0700 01/07/24  0928   SODIUM mmol/L 139 139   POTASSIUM mmol/L 3.9 4.3   CHLORIDE mmol/L 105 101   CO2 mmol/L 22.7 27.0   BUN mg/dL 13 11   CREATININE mg/dL 0.59* 0.66*   CALCIUM mg/dL 8.8 9.7   BILIRUBIN mg/dL 0.3 0.3   ALK PHOS U/L 136* 153*   ALT (SGPT) U/L 17 17   AST (SGOT) U/L 30 39   GLUCOSE mg/dL 123* 164*     Results from last 7 days   Lab Units 01/08/24  0700 01/08/24  0429 01/07/24  0928   MAGNESIUM mg/dL  --   --  2.0   HEMOGLOBIN g/dL  --  10.4* 12.7*   HEMATOCRIT %  --  33.6* 40.5   WBC 10*3/mm3  --  10.15 13.52*   ALBUMIN g/dL 2.9*  --  3.8     Results from last 7 days   Lab Units 01/08/24  0429 01/07/24  0928   INR   --  1.34*   APTT seconds  --  42.5*   PLATELETS 10*3/mm3 455* 498*     COVID19   Date Value Ref Range Status   01/07/2024 Not Detected Not Detected - Ref. Range Final     Lab Results   Component Value Date    HGBA1C 5.50 11/04/2023          Medications           Scheduled Medications amitriptyline, 50 mg, Per G Tube, Nightly  amLODIPine, 5 mg, Per G Tube, Daily  apixaban, 2.5 mg, Oral, Q12H  [START ON 1/31/2024] cyanocobalamin, 1,000 mcg, Intramuscular, Q30 Days  HYDROcodone-acetaminophen, 1 tablet, Oral, TID  lansoprazole, 30 mg, Per G Tube, BID AC  Menthol-Zinc Oxide, 1 application , Topical, Q12H  rosuvastatin, 20 mg, Per G Tube,  Nightly  sodium chloride, 10 mL, Intravenous, Q12H  sucralfate, 1 g, Per G Tube, BID AC       Infusions sodium chloride, 100 mL/hr, Last Rate: 100 mL/hr (01/07/24 5222)       PRN Medications   acetaminophen **OR** acetaminophen **OR** acetaminophen    acetaminophen    Calcium Replacement - Follow Nurse / BPA Driven Protocol    guaifenesin    ipratropium-albuterol    Magnesium Standard Dose Replacement - Follow Nurse / BPA Driven Protocol    nitroglycerin    ondansetron    Phosphorus Replacement - Follow Nurse / BPA Driven Protocol    Potassium Replacement - Follow Nurse / BPA Driven Protocol    sennosides-docusate    sodium chloride    sodium chloride     Physical Findings          General Findings oriented, room air   Oral/Mouth Cavity tooth or teeth missing   Edema  2+ (mild), 3+ (moderate)   Gastrointestinal last bowel movement: 1/6   Skin  pressure injury: stage 2 coccyx   Tubes/Drains/Lines PEG   NFPE Not indicated at this time   --  Estimated/Assessed Needs        Current Weight  Weight: 75.3 kg (165 lb 14.6 oz) (01/07/24 1700)       Energy Requirements    Weight for Calculation 75.3 kg   Method for Estimation  30-35 kcal/kg   EST Needs (kcal/day) 0758-9971       Protein Requirements    Weight for Calculation 75.3 kg   EST Protein Needs (g/kg) 1.2 - 1.5 gm/kg   EST Daily Needs (g/day)        Fluid Requirements     Method for Estimation 1 mL/kcal    EST Needs (mL/day)      Current Nutrition Orders & Evaluation of Intake       Oral Nutrition     Food Allergies NKFA   Current PO Diet NPO Diet NPO Type: Strict NPO   Supplement n/a   PO Evaluation     % PO Intake NPO    Factors Affecting Intake: swallow impairment, weakness   --  PES STATEMENT / NUTRITION DIAGNOSIS      Nutrition Dx Problem  Problem: Swallowing Difficulty  Etiology: Medical Diagnosis - Hx of stroke with dysphagia    Signs/Symptoms: NPO     NUTRITION INTERVENTION / PLAN OF CARE      Intervention Goal(s) Maintain nutrition status,  Reduce/improve symptoms, Meet estimated needs, Disease management/therapy, Initiate TF/PN, Tolerate TF/PN at goal, and Maintain weight         RD Intervention/Action Continue to monitor, Care plan reviewed, and Recommend/order: TF's   --      Prescription/Orders:       PO Diet       Supplements       Enteral Nutrition       Parenteral Nutrition    New Prescription Ordered? Yes   --   Enteral Prescription:     Enteral Route PEG    TF Delivery Method Continuous    Enteral Product Isosource 1.5    Modular None    Propofol Rate/Kcal     TF Start Rate  20 mL/hr    TF Goal Rate  60 mL/hr    Free Water Flush 30 mL Q 4 hr    Provision at Goal:          Calories 2160 kcal, meets 95% needs         Protein  97 gm protein, meets 100% needs         Fluid (mL) 1094 mL free water + 180 mL in flushes         Monitor/Evaluation Per protocol   Discharge Plan/Needs Pending clinical course   --    RD to follow per protocol.      Electronically signed by:  Meera Reis RD  01/08/24 09:08 EST

## 2024-01-08 NOTE — PLAN OF CARE
Goal Outcome Evaluation:              Outcome Evaluation: Clinical swallow evaluation completed. Wife at bedside. PEG placed previous hospitalization. Patient and wife eager for diet initiation stating patient completed mobile VFSS through third party prior to SNF discharge and passed for regular solid diet; SLP unable to locate records. Previous hospitalization in November 2023 recommended NPO with PEG feeds. CXR indicated clear lungs. At bedside, patient presented with wet vocal quality with ice chip, throat clear with thin liquid by spoon, and wet vocal quality/cough with thin liquid by cup. No overt s/s of aspiration with nectar by spoon/cup/straw, puree, or soft/chopped solid. Prolonged munching mastication due to missing teeth. Recommend mechanical soft diet with nectar thick liquids. No mixed consistencies. Meds whole or crushed with puree or nectar thick liquid. Sitting upright, slow rate, small bites/sips. Speech to follow with VFSS to further assess swallow function.

## 2024-01-08 NOTE — NURSING NOTE
Patient has no teeth as dentures do not fit now related to gums recession. Plan was to have new dentures made. Patient reported a soft diet such as scrambled eggs, soup, mashed potatoes and g -tube feedings 4 times a day as he wants to eat his meals. The intermittent feeds per patient are nutren1.5 /osmolite 1.5.   Nutrition consulted for this patient as he reported losing 10 pounds and he is very weak with a pressure injury stage 2. Dr Rogers is aware.

## 2024-01-08 NOTE — TELEPHONE ENCOUNTER
Caller: Brooke Santoro ([facility])    Relationship: Emergency Contact    PHONE: [phone]     What orders are you requesting (i.e. lab or imaging): HOSPITAL BED (RENTAL) -- IT NEEDS TO BE ABLE TO BE LOWERED AND HEAD NEEDS TO BE ADJUSTABLE    In what timeframe would the patient need to come in: ASAP PATIENT WILL BE RELEASED 1/9 OR 1/10     Where will you receive your lab/imaging services: NA    Additional notes: PLEASE CALL AND ADVISE

## 2024-01-08 NOTE — PLAN OF CARE
Goal Outcome Evaluation:           Progress: improving       Orthostatic blood pressures negative this shift. Tube feedings started and has been tolerating thus far. Xray completed this shift.

## 2024-01-08 NOTE — TELEPHONE ENCOUNTER
Patient 's spouse told sent to dr giron for approval. She was told to schedule a hosp follow up .

## 2024-01-08 NOTE — THERAPY EVALUATION
Patient Name: Madhu Santoro  : 1945    MRN: [id number]                              Today's Date: 2024       Admit Date: 2024    Visit Dx:     ICD-10-CM ICD-9-CM   1. Generalized weakness  R53.1 780.79   2. Unable to ambulate  R26.2 719.7   3. Bandemia  D72.825 288.66   4. Orthostatic hypotension  I95.1 458.0   5. Neuropathy  G62.9 355.9     Patient Active Problem List   Diagnosis    Anxiety    Arthritis    Coronary artery disease due to lipid rich plaque    HLD (hyperlipidemia)    Benign essential hypertension    DDD (degenerative disc disease), lumbosacral    Cerebrovascular accident    Encounter for screening for cardiovascular disorders    Gastroesophageal reflux disease    Hyperlipidemia    Stenosis of carotid artery    Former smoker    History of cardiac catheterization    S/P ablation of atrial flutter    Typical atrial flutter    S/P CABG (coronary artery bypass graft)    Adenomatous polyp of ascending colon    Abdominal bloating    Stroke    PAD (peripheral artery disease)    Acute cholecystitis    Right upper quadrant abdominal pain    Chronic pain of both hips    Chronic bilateral low back pain without sciatica    Sacroiliac joint dysfunction of both sides    Encounter for long-term (current) use of high-risk medication    Lumbar facet arthropathy    Stroke-like symptoms    CVA (cerebral vascular accident)    Personal history of colonic polyps    Arthralgia of multiple joints    Iron deficiency    Thrombocytosis    Left ureteral stone    Obstructive uropathy    Chronic anticoagulation    Facial skin lesion    Iron deficiency anemia    Polycythemia    Neuropathy    Weakness    Pneumonia    Close exposure to COVID-19 virus    Polycythemia vera    Chronic gout without tophus    COVID-19    Cytokine release syndrome, grade 2    Anemia    History of CVA (cerebrovascular accident)    S/P percutaneous endoscopic gastrostomy (PEG) tube placement    Mandel esophagus    Sacral decubitus ulcer     "Oral phase dysphagia    Acute blood loss anemia    Orthostatic dizziness     Past Medical History:   Diagnosis Date    Anxiety     Arthritis     Atrial flutter     Status post cavotricuspid isthmus ablation by Dr. Gustafson on 4/18/17    Mandel esophagus     Benign essential hypertension     CAD (coronary artery disease)     3 vessel CABG 4/11/17 by Dr. Cortez: ROSARIO-prox LAD, SVG-OM1, SVG-OM3    Carotid artery disease     Status post carotid endarterectomy - [facility] 4/10/17: 50-59% NICHELLE, 1-15% LICA.     Colonic polyp     Cyst of pancreas     DDD (degenerative disc disease), lumbosacral     GERD (gastroesophageal reflux disease)     H/O bone density study 2013    H/O complete eye exam 2014    History of kidney stone     HLD (hyperlipidemia)     Hypertension     Kidney stone     8/22/22    Lipid screening 05/31/2013    Low back pain     [facility]    Screening for prostate cancer 07/07/2015    Skin cancer     nose    Stroke     RESIDUAL--\"BALANCE ISSUES\"     Past Surgical History:   Procedure Laterality Date    CARDIAC CATHETERIZATION N/A 04/10/2017    Procedure: Left Heart Cath;  Surgeon: Marjorie Healy MD;  Location:  [facility];  Service:     CARDIAC CATHETERIZATION N/A 04/10/2017    Procedure: Coronary angiography;  Surgeon: Marjorie Healy MD;  Location:  [facility];  Service:     CARDIAC CATHETERIZATION N/A 04/10/2017    Procedure: Left ventriculography;  Surgeon: Marjorie Healy MD;  Location:  [facility];  Service:     CARDIAC CATHETERIZATION  2011    CARDIAC ELECTROPHYSIOLOGY PROCEDURE N/A 04/18/2017    Procedure: Ablation atrial flutter;  Surgeon: Jose Antonio Gustafson MD;  Location:  [facility];  Service:     CAROTID ENDARTERECTOMY      CHOLECYSTECTOMY      CHOLECYSTECTOMY WITH INTRAOPERATIVE CHOLANGIOGRAM N/A 09/07/2019    Procedure: Laparoscopic cholecystectomy with intraoperative cholangiogram;  Surgeon: Thaddeus" MD Gauri;  Location: [facility] MAIN OR;  Service: General    COLONOSCOPY  01/06/2015    Diverticulosis, one TA    COLONOSCOPY N/A 02/14/2019    tics, NBIH, adenomatous polyp x 2    COLONOSCOPY N/A 11/05/2021    Procedure: COLONOSCOPY TO CECUM AND TERM. ILEUM WITH COLD POLYPECTOMIES;  Surgeon: Everton Abel MD;  Location: [facility] ENDOSCOPY;  Service: Gastroenterology;  Laterality: N/A;  PRE OP - PERS H/O POLYPS  POST OP - COLON POLYPS,, DIVERTICULOSIS, HEMORRHOIDS    CORONARY ARTERY BYPASS GRAFT N/A 04/11/2017    Procedure: AR STERNOTOMY CORONARY ARTERY BYPASS GRAFT TIMES 3 USING LEFT INTERNAL MAMMARY ARTERY AND LEFT GREATER SAPHENOUS VEIN GRAFT PER ENDOSCOPIC VEIN HARVESTING AND PRP ;  Surgeon: Temo Cortez MD;  Location: [facility] MAIN OR;  Service:     ENDOSCOPY  01/06/2015    HH, Ervin's esophagus    ENDOSCOPY N/A 02/14/2019    Z line irregular, HH, Ervin's esophagus    ENDOSCOPY N/A 11/05/2021    Procedure: ESOPHAGOGASTRODUODENOSCOPY WITH BIOPSIES;  Surgeon: Everton Abel MD;  Location: [facility] ENDOSCOPY;  Service: Gastroenterology;  Laterality: N/A;  PRE OP - PERS H/O ERVIN'S  POST OP - IRREG Z LINE    ENDOSCOPY W/ PEG TUBE PLACEMENT N/A 11/6/2023    Procedure: ESOPHAGOGASTRODUODENOSCOPY WITH PERCUTANEOUS ENDOSCOPIC GASTROSTOMY TUBE INSERTION;  Surgeon: Temo Ramsey MD;  Location: [facility] ENDOSCOPY;  Service: General;  Laterality: N/A;  Pre: dysphagia  Post: same    KNEE SURGERY Left     URETEROSCOPY LASER LITHOTRIPSY WITH STENT INSERTION Left 8/23/2022    Procedure: Cysto retrograde with left uretro stent placement;  Surgeon: Damien Oliveira MD;  Location: [facility] MAIN OR;  Service: Urology;  Laterality: Left;    VASECTOMY        General Information       Row Name 01/08/24 0959          Physical Therapy Time and Intention    Document Type evaluation  -CH     Mode of Treatment physical therapy;co-treatment;occupational therapy  -       Row Name 01/08/24 0959          General  Information    Prior Level of Function independent:;gait;transfer;bed mobility;min assist:  walks with walker, pt reports son has been helping stand since being home from SNF  -     Existing Precautions/Restrictions fall  -     Barriers to Rehab medically complex;previous functional deficit  -       Row Name 01/08/24 0959          Living Environment    People in Home spouse  -       Row Name 01/08/24 0959          Home Main Entrance    Number of Stairs, Main Entrance two  -       Row Name 01/08/24 0959          Cognition    Orientation Status (Cognition) oriented x 4  -       Row Name 01/08/24 0959          Safety Issues, Functional Mobility    Impairments Affecting Function (Mobility) balance;endurance/activity tolerance;strength  -     Comment, Safety Issues/Impairments (Mobility) PT/OT cotreatment performed to maximize patient's functional mobility potential and to avoid a missed evaluation secondary to patient unable to tolerate multiple therapy sessions. PT and OT will plan for separate sessions in the future.  -               User Key  (r) = Recorded By, (t) = Taken By, (c) = Cosigned By      Initials Name Provider Type     Blanca Elkins, PT Physical Therapist                   Mobility       Row Name 01/08/24 1000          Bed Mobility    Bed Mobility supine-sit;sit-supine  -     Supine-Sit [place] (Bed Mobility) verbal cues;nonverbal cues (demo/gesture);moderate assist (50% patient effort)  -     Sit-Supine [place] (Bed Mobility) verbal cues;nonverbal cues (demo/gesture);moderate assist (50% patient effort)  -     Assistive Device (Bed Mobility) bed rails;head of bed elevated  -       Row Name 01/08/24 1000          Sit-Stand Transfer    Sit-Stand [place] (Transfers) verbal cues;nonverbal cues (demo/gesture);minimum assist (75% patient effort);2 person assist  -     Assistive Device (Sit-Stand Transfers) walker, front-wheeled  -       Row Name 01/08/24 1000           Gait/Stairs (Locomotion)    Glenn Level (Gait) verbal cues;nonverbal cues (demo/gesture);minimum assist (75% patient effort)  -     Assistive Device (Gait) walker, front-wheeled  -     Distance in Feet (Gait) 10  -     Deviations/Abnormal Patterns (Gait) jemal decreased;gait speed decreased;stride length decreased  -     Bilateral Gait Deviations forward flexed posture  -     Comment, (Gait/Stairs) repeated cues to stand tall and look up, impaired balance with backward steps  -               User Key  (r) = Recorded By, (t) = Taken By, (c) = Cosigned By      Initials Name Provider Type     Blanca Elkins, PT Physical Therapist                   Obj/Interventions       Row Name 01/08/24 1002          Range of Motion Comprehensive    General Range of Motion no range of motion deficits identified  -       Row Name 01/08/24 1002          Strength Comprehensive (MMT)    Comment, General Manual Muscle Testing (MMT) Assessment generalized weakness noted with functional mobility, B LE grossly 3-/5 to 3/5, pt reports LEs feel weak and heavy  -       Row Name 01/08/24 1002          Motor Skills    Therapeutic Exercise --  3 reps B LE AP and LAQ  -       Row Name 01/08/24 1002          Balance    Balance Assessment standing static balance;standing dynamic balance  -     Static Standing Balance verbal cues;non-verbal cues (demo/gesture);contact guard  -     Dynamic Standing Balance verbal cues;non-verbal cues (demo/gesture);minimal assist  -     Position/Device Used, Standing Balance walker, rolling  -               User Key  (r) = Recorded By, (t) = Taken By, (c) = Cosigned By      Initials Name Provider Type     Blanca Elkins, PT Physical Therapist                   Goals/Plan       Row Name 01/08/24 1009          Bed Mobility Goal 1 (PT)    Activity/Assistive Device (Bed Mobility Goal 1, PT) bed mobility activities, all  -     Glenn Level/Cues Needed (Bed  Mobility Goal 1, PT) standby assist  -CH     Time Frame (Bed Mobility Goal 1, PT) 1 week  -       Row Name 01/08/24 1009          Transfer Goal 1 (PT)    Activity/Assistive Device (Transfer Goal 1, PT) transfers, all;walker, rolling  -CH     [place] Level/Cues Needed (Transfer Goal 1, PT) standby assist  -CH     Time Frame (Transfer Goal 1, PT) 1 week  -       Row Name 01/08/24 1009          Gait Training Goal 1 (PT)    Activity/Assistive Device (Gait Training Goal 1, PT) gait (walking locomotion);walker, rolling  -CH     [place] Level (Gait Training Goal 1, PT) standby assist  -CH     Distance (Gait Training Goal 1, PT) 100  -CH     Time Frame (Gait Training Goal 1, PT) 1 week  -       Row Name 01/08/24 1009          Therapy Assessment/Plan (PT)    Planned Therapy Interventions (PT) balance training;bed mobility training;gait training;home exercise program;patient/family education;strengthening;transfer training  -               User Key  (r) = Recorded By, (t) = Taken By, (c) = Cosigned By      Initials Name Provider Type     Blanca Elkins, PT Physical Therapist                   Clinical Impression       Row Name 01/08/24 1003          Pain    Pretreatment Pain Rating 0/10 - no pain  -     Posttreatment Pain Rating 0/10 - no pain  -       Row Name 01/08/24 1003          Plan of Care Review    Plan of Care Reviewed With patient  -     Outcome Evaluation Pt is a 77 yo M who was admitted with generalized weakness, dizziness, and falls. Pt has a history of CVA and was home from SNF for 2 days prior to this admit. Pt reports he was getting up with assist from his son at home but had experienced increased weakness since DC from SNF. Pt presnts to PT with impaired functional mobility and gait secondary to generalized weakness, impaired balance, and decreased activity tolerance. Pt may benefit from skilled PT to address strength, mobility, and gait.  -       Row Name 01/08/24 1003           Therapy Assessment/Plan (PT)    Patient/Family Therapy Goals Statement (PT) to return to PLOF  -     Rehab Potential (PT) good, to achieve stated therapy goals  -     Criteria for Skilled Interventions Met (PT) skilled treatment is necessary  -     Therapy Frequency (PT) 5 times/wk  -       Row Name 01/08/24 1003          Positioning and Restraints    Pre-Treatment Position in bed  -     Post Treatment Position bed  -     In Bed supine;call light within reach;encouraged to call for assist;exit alarm on  -               User Key  (r) = Recorded By, (t) = Taken By, (c) = Cosigned By      Initials Name Provider Type     Blanca Elkins, PT Physical Therapist                   Outcome Measures       Row Name 01/08/24 1010          How much help from another person do you currently need...    Turning from your back to your side while in flat bed without using bedrails? 2  -CH     Moving from lying on back to sitting on the side of a flat bed without bedrails? 2  -CH     Moving to and from a bed to a chair (including a wheelchair)? 3  -CH     Standing up from a chair using your arms (e.g., wheelchair, bedside chair)? 3  -CH     Climbing 3-5 steps with a railing? 1  -CH     To walk in hospital room? 2  -     AM-PAC 6 Clicks Score (PT) 13  -CH     Highest Level of Mobility Goal 4 --> Transfer to chair/commode  -       Row Name 01/08/24 1010          Functional Assessment    Outcome Measure Options AM-PAC 6 Clicks Basic Mobility (PT)  -               User Key  (r) = Recorded By, (t) = Taken By, (c) = Cosigned By      Initials Name Provider Type     Blanca Elkins, PT Physical Therapist                                 Physical Therapy Education       Title: PT OT SLP Therapies (Done)       Topic: Physical Therapy (Done)       Point: Mobility training (Done)       Learning Progress Summary             Patient Acceptance, E,TB,D, VU,NR by  at 1/8/2024 1010                         Point: Home  exercise program (Done)       Learning Progress Summary             Patient Acceptance, E,TB,D, VU,NR by  at 1/8/2024 1010                         Point: Body mechanics (Done)       Learning Progress Summary             Patient Acceptance, E,TB,D, VU,NR by  at 1/8/2024 1010                         Point: Precautions (Done)       Learning Progress Summary             Patient Acceptance, E,TB,D, VU,NR by  at 1/8/2024 1010                                         User Key       Initials Effective Dates Name Provider Type Discipline     06/16/21 -  Blanca Elkins, PT Physical Therapist PT                  PT Recommendation and Plan  Planned Therapy Interventions (PT): balance training, bed mobility training, gait training, home exercise program, patient/family education, strengthening, transfer training  Plan of Care Reviewed With: patient  Outcome Evaluation: Pt is a 77 yo M who was admitted with generalized weakness, dizziness, and falls. Pt has a history of CVA and was home from SNF for 2 days prior to this admit. Pt reports he was getting up with assist from his son at home but had experienced increased weakness since DC from SNF. Pt presnts to PT with impaired functional mobility and gait secondary to generalized weakness, impaired balance, and decreased activity tolerance. Pt may benefit from skilled PT to address strength, mobility, and gait.     Time Calculation:         PT Charges       Row Name 01/08/24 1011             Time Calculation    Start Time 0934  -      Stop Time 0949  -      Time Calculation (min) 15 min  -      PT Received On 01/08/24  -      PT - Next Appointment 01/09/24  -      PT Goal Re-Cert Due Date 01/15/24  -         Time Calculation- PT    Total Timed Code Minutes- PT 10 minute(s)  -         Timed Charges    34982 - PT Therapeutic Activity Minutes 10  -CH         Total Minutes    Timed Charges Total Minutes 10  -CH       Total Minutes 10  -CH                User  Key  (r) = Recorded By, (t) = Taken By, (c) = Cosigned By      Initials Name Provider Type     Blanca Elkins, PT Physical Therapist                  Therapy Charges for Today       Code Description Service Date Service Provider Modifiers Qty    31147014316 HC PT THERAPEUTIC ACT EA 15 MIN 1/8/2024 Blanca Elkins, PT GP 1    77934205942 HC PT EVAL MOD COMPLEXITY 2 1/8/2024 Blanca Elkins, PT GP 1            PT G-Codes  Outcome Measure Options: AM-PAC 6 Clicks Basic Mobility (PT)  AM-PAC 6 Clicks Score (PT): 13  PT Discharge Summary  Anticipated Discharge Disposition (PT): skilled nursing facility (pending progress)    Blanca Elkins, PT  1/8/2024

## 2024-01-09 ENCOUNTER — APPOINTMENT (OUTPATIENT)
Dept: CARDIOLOGY | Facility: HOSPITAL | Age: 79
DRG: 312 | End: 2024-01-09
Payer: MEDICARE

## 2024-01-09 ENCOUNTER — APPOINTMENT (OUTPATIENT)
Dept: GENERAL RADIOLOGY | Facility: HOSPITAL | Age: 79
DRG: 312 | End: 2024-01-09
Payer: MEDICARE

## 2024-01-09 DIAGNOSIS — I48.91 ATRIAL FIBRILLATION, UNSPECIFIED TYPE: ICD-10-CM

## 2024-01-09 DIAGNOSIS — R26.81 UNSTEADY GAIT: ICD-10-CM

## 2024-01-09 DIAGNOSIS — I25.118 CORONARY ARTERY DISEASE WITH OTHER FORM OF ANGINA PECTORIS, UNSPECIFIED VESSEL OR LESION TYPE, UNSPECIFIED WHETHER NATIVE OR TRANSPLANTED HEART: ICD-10-CM

## 2024-01-09 DIAGNOSIS — I63.019 CEREBROVASCULAR ACCIDENT (CVA) DUE TO THROMBOSIS OF VERTEBRAL ARTERY, UNSPECIFIED BLOOD VESSEL LATERALITY: Primary | ICD-10-CM

## 2024-01-09 PROBLEM — R29.898 BILATERAL LEG WEAKNESS: Status: ACTIVE | Noted: 2024-01-09

## 2024-01-09 PROBLEM — I95.1 ORTHOSTATIC HYPOTENSION: Status: ACTIVE | Noted: 2024-01-09

## 2024-01-09 LAB
ANION GAP SERPL CALCULATED.3IONS-SCNC: 10.8 MMOL/L (ref 5–15)
BH CV LOWER VASCULAR LEFT COMMON FEMORAL AUGMENT: NORMAL
BH CV LOWER VASCULAR LEFT COMMON FEMORAL COMPETENT: NORMAL
BH CV LOWER VASCULAR LEFT COMMON FEMORAL COMPRESS: NORMAL
BH CV LOWER VASCULAR LEFT COMMON FEMORAL PHASIC: NORMAL
BH CV LOWER VASCULAR LEFT COMMON FEMORAL SPONT: NORMAL
BH CV LOWER VASCULAR LEFT DISTAL FEMORAL COMPRESS: NORMAL
BH CV LOWER VASCULAR LEFT GASTRONEMIUS COMPRESS: NORMAL
BH CV LOWER VASCULAR LEFT GREATER SAPH AK COMPRESS: NORMAL
BH CV LOWER VASCULAR LEFT GREATER SAPH BK COMPRESS: NORMAL
BH CV LOWER VASCULAR LEFT LESSER SAPH COMPRESS: NORMAL
BH CV LOWER VASCULAR LEFT MID FEMORAL AUGMENT: NORMAL
BH CV LOWER VASCULAR LEFT MID FEMORAL COMPETENT: NORMAL
BH CV LOWER VASCULAR LEFT MID FEMORAL COMPRESS: NORMAL
BH CV LOWER VASCULAR LEFT MID FEMORAL PHASIC: NORMAL
BH CV LOWER VASCULAR LEFT MID FEMORAL SPONT: NORMAL
BH CV LOWER VASCULAR LEFT PERONEAL COMPRESS: NORMAL
BH CV LOWER VASCULAR LEFT POPLITEAL AUGMENT: NORMAL
BH CV LOWER VASCULAR LEFT POPLITEAL COMPETENT: NORMAL
BH CV LOWER VASCULAR LEFT POPLITEAL COMPRESS: NORMAL
BH CV LOWER VASCULAR LEFT POPLITEAL PHASIC: NORMAL
BH CV LOWER VASCULAR LEFT POPLITEAL SPONT: NORMAL
BH CV LOWER VASCULAR LEFT POSTERIOR TIBIAL COMPRESS: NORMAL
BH CV LOWER VASCULAR LEFT PROFUNDA FEMORAL COMPRESS: NORMAL
BH CV LOWER VASCULAR LEFT PROXIMAL FEMORAL COMPRESS: NORMAL
BH CV LOWER VASCULAR LEFT SAPHENOFEMORAL JUNCTION COMPRESS: NORMAL
BH CV LOWER VASCULAR RIGHT COMMON FEMORAL AUGMENT: NORMAL
BH CV LOWER VASCULAR RIGHT COMMON FEMORAL COMPETENT: NORMAL
BH CV LOWER VASCULAR RIGHT COMMON FEMORAL COMPRESS: NORMAL
BH CV LOWER VASCULAR RIGHT COMMON FEMORAL PHASIC: NORMAL
BH CV LOWER VASCULAR RIGHT COMMON FEMORAL SPONT: NORMAL
BH CV LOWER VASCULAR RIGHT DISTAL FEMORAL COMPRESS: NORMAL
BH CV LOWER VASCULAR RIGHT GASTRONEMIUS COMPRESS: NORMAL
BH CV LOWER VASCULAR RIGHT GREATER SAPH AK COMPRESS: NORMAL
BH CV LOWER VASCULAR RIGHT GREATER SAPH BK COMPRESS: NORMAL
BH CV LOWER VASCULAR RIGHT LESSER SAPH COMPRESS: NORMAL
BH CV LOWER VASCULAR RIGHT MID FEMORAL AUGMENT: NORMAL
BH CV LOWER VASCULAR RIGHT MID FEMORAL COMPETENT: NORMAL
BH CV LOWER VASCULAR RIGHT MID FEMORAL COMPRESS: NORMAL
BH CV LOWER VASCULAR RIGHT MID FEMORAL PHASIC: NORMAL
BH CV LOWER VASCULAR RIGHT MID FEMORAL SPONT: NORMAL
BH CV LOWER VASCULAR RIGHT PERONEAL COMPRESS: NORMAL
BH CV LOWER VASCULAR RIGHT POPLITEAL AUGMENT: NORMAL
BH CV LOWER VASCULAR RIGHT POPLITEAL COMPETENT: NORMAL
BH CV LOWER VASCULAR RIGHT POPLITEAL COMPRESS: NORMAL
BH CV LOWER VASCULAR RIGHT POPLITEAL PHASIC: NORMAL
BH CV LOWER VASCULAR RIGHT POPLITEAL SPONT: NORMAL
BH CV LOWER VASCULAR RIGHT POSTERIOR TIBIAL COMPRESS: NORMAL
BH CV LOWER VASCULAR RIGHT PROFUNDA FEMORAL COMPRESS: NORMAL
BH CV LOWER VASCULAR RIGHT PROXIMAL FEMORAL COMPRESS: NORMAL
BH CV LOWER VASCULAR RIGHT SAPHENOFEMORAL JUNCTION COMPRESS: NORMAL
BUN SERPL-MCNC: 11 MG/DL (ref 8–23)
BUN/CREAT SERPL: 22 (ref 7–25)
CALCIUM SPEC-SCNC: 8.6 MG/DL (ref 8.6–10.5)
CHLORIDE SERPL-SCNC: 106 MMOL/L (ref 98–107)
CO2 SERPL-SCNC: 22.2 MMOL/L (ref 22–29)
CREAT SERPL-MCNC: 0.5 MG/DL (ref 0.76–1.27)
DEPRECATED RDW RBC AUTO: 53.8 FL (ref 37–54)
EGFRCR SERPLBLD CKD-EPI 2021: 103.8 ML/MIN/1.73
ERYTHROCYTE [DISTWIDTH] IN BLOOD BY AUTOMATED COUNT: 15.8 % (ref 12.3–15.4)
GLUCOSE BLDC GLUCOMTR-MCNC: 116 MG/DL (ref 70–130)
GLUCOSE BLDC GLUCOMTR-MCNC: 118 MG/DL (ref 70–130)
GLUCOSE BLDC GLUCOMTR-MCNC: 139 MG/DL (ref 70–130)
GLUCOSE BLDC GLUCOMTR-MCNC: 236 MG/DL (ref 70–130)
GLUCOSE SERPL-MCNC: 96 MG/DL (ref 65–99)
HCT VFR BLD AUTO: 36.4 % (ref 37.5–51)
HGB BLD-MCNC: 11.3 G/DL (ref 13–17.7)
MCH RBC QN AUTO: 29.3 PG (ref 26.6–33)
MCHC RBC AUTO-ENTMCNC: 31 G/DL (ref 31.5–35.7)
MCV RBC AUTO: 94.3 FL (ref 79–97)
PLATELET # BLD AUTO: 491 10*3/MM3 (ref 140–450)
PMV BLD AUTO: 10.1 FL (ref 6–12)
POTASSIUM SERPL-SCNC: 3.6 MMOL/L (ref 3.5–5.2)
POTASSIUM SERPL-SCNC: 4.6 MMOL/L (ref 3.5–5.2)
RBC # BLD AUTO: 3.86 10*6/MM3 (ref 4.14–5.8)
SODIUM SERPL-SCNC: 139 MMOL/L (ref 136–145)
WBC NRBC COR # BLD AUTO: 13.18 10*3/MM3 (ref 3.4–10.8)

## 2024-01-09 PROCEDURE — 74230 X-RAY XM SWLNG FUNCJ C+: CPT

## 2024-01-09 PROCEDURE — 93970 EXTREMITY STUDY: CPT

## 2024-01-09 PROCEDURE — 80048 BASIC METABOLIC PNL TOTAL CA: CPT | Performed by: NURSE PRACTITIONER

## 2024-01-09 PROCEDURE — 85027 COMPLETE CBC AUTOMATED: CPT | Performed by: NURSE PRACTITIONER

## 2024-01-09 PROCEDURE — 25810000003 SODIUM CHLORIDE 0.9 % SOLUTION: Performed by: NURSE PRACTITIONER

## 2024-01-09 PROCEDURE — 82948 REAGENT STRIP/BLOOD GLUCOSE: CPT

## 2024-01-09 PROCEDURE — 97110 THERAPEUTIC EXERCISES: CPT

## 2024-01-09 PROCEDURE — 97530 THERAPEUTIC ACTIVITIES: CPT

## 2024-01-09 PROCEDURE — 92611 MOTION FLUOROSCOPY/SWALLOW: CPT

## 2024-01-09 PROCEDURE — 84132 ASSAY OF SERUM POTASSIUM: CPT | Performed by: STUDENT IN AN ORGANIZED HEALTH CARE EDUCATION/TRAINING PROGRAM

## 2024-01-09 RX ORDER — POTASSIUM CHLORIDE 1.5 G/1.58G
40 POWDER, FOR SOLUTION ORAL EVERY 4 HOURS
Status: COMPLETED | OUTPATIENT
Start: 2024-01-09 | End: 2024-01-09

## 2024-01-09 RX ORDER — ALLOPURINOL 300 MG/1
300 TABLET ORAL DAILY
Status: DISCONTINUED | OUTPATIENT
Start: 2024-01-09 | End: 2024-01-12 | Stop reason: HOSPADM

## 2024-01-09 RX ADMIN — APIXABAN 2.5 MG: 2.5 TABLET, FILM COATED ORAL at 08:04

## 2024-01-09 RX ADMIN — Medication 10 ML: at 08:06

## 2024-01-09 RX ADMIN — HYDROCODONE BITARTRATE AND ACETAMINOPHEN 1 TABLET: 7.5; 325 TABLET ORAL at 08:05

## 2024-01-09 RX ADMIN — BARIUM SULFATE 55 ML: 0.81 POWDER, FOR SUSPENSION ORAL at 08:55

## 2024-01-09 RX ADMIN — ALLOPURINOL 300 MG: 300 TABLET ORAL at 14:14

## 2024-01-09 RX ADMIN — SUCRALFATE 1 G: 1 TABLET ORAL at 16:36

## 2024-01-09 RX ADMIN — ROSUVASTATIN CALCIUM 20 MG: 20 TABLET, FILM COATED ORAL at 21:51

## 2024-01-09 RX ADMIN — BARIUM SULFATE 50 ML: 400 SUSPENSION ORAL at 08:55

## 2024-01-09 RX ADMIN — BARIUM SULFATE 4 ML: 980 POWDER, FOR SUSPENSION ORAL at 08:55

## 2024-01-09 RX ADMIN — ANORECTAL OINTMENT 1 APPLICATION: 15.7; .44; 24; 20.6 OINTMENT TOPICAL at 08:05

## 2024-01-09 RX ADMIN — APIXABAN 2.5 MG: 2.5 TABLET, FILM COATED ORAL at 21:51

## 2024-01-09 RX ADMIN — Medication 10 ML: at 21:52

## 2024-01-09 RX ADMIN — LANSOPRAZOLE 30 MG: 15 TABLET, ORALLY DISINTEGRATING ORAL at 16:36

## 2024-01-09 RX ADMIN — SUCRALFATE 1 G: 1 TABLET ORAL at 08:04

## 2024-01-09 RX ADMIN — LANSOPRAZOLE 30 MG: 15 TABLET, ORALLY DISINTEGRATING ORAL at 08:05

## 2024-01-09 RX ADMIN — POTASSIUM CHLORIDE 40 MEQ: 1.5 FOR SOLUTION ORAL at 08:05

## 2024-01-09 RX ADMIN — HYDROCODONE BITARTRATE AND ACETAMINOPHEN 1 TABLET: 7.5; 325 TABLET ORAL at 21:52

## 2024-01-09 RX ADMIN — AMITRIPTYLINE HYDROCHLORIDE 50 MG: 50 TABLET, FILM COATED ORAL at 21:52

## 2024-01-09 RX ADMIN — BARIUM SULFATE 1 TEASPOON(S): 0.6 CREAM ORAL at 08:55

## 2024-01-09 RX ADMIN — ANORECTAL OINTMENT 1 APPLICATION: 15.7; .44; 24; 20.6 OINTMENT TOPICAL at 21:52

## 2024-01-09 RX ADMIN — DOCUSATE SODIUM 50MG AND SENNOSIDES 8.6MG 2 TABLET: 8.6; 5 TABLET, FILM COATED ORAL at 21:55

## 2024-01-09 RX ADMIN — POTASSIUM CHLORIDE 40 MEQ: 1.5 FOR SOLUTION ORAL at 11:23

## 2024-01-09 RX ADMIN — HYDROCODONE BITARTRATE AND ACETAMINOPHEN 1 TABLET: 7.5; 325 TABLET ORAL at 16:36

## 2024-01-09 RX ADMIN — SODIUM CHLORIDE 50 ML/HR: 9 INJECTION, SOLUTION INTRAVENOUS at 11:24

## 2024-01-09 RX ADMIN — AMLODIPINE BESYLATE 5 MG: 5 TABLET ORAL at 08:04

## 2024-01-09 NOTE — CASE MANAGEMENT/SOCIAL WORK
Continued Stay Note  [facility]     Patient Name: Madhu Santoro  MRN: [id number]  Today's Date: 1/9/2024    Admit Date: 1/7/2024    Plan: [facility] snf   Discharge Plan       Row Name 01/09/24 1709       Plan    Plan [facility]    Patient/Family in Agreement with Plan yes    Plan Comments CCP spoke with [facility], pt accepted and bed available tomorrow if ready. Pt was on osmolite 1.5 at facility, and they were wondering if pt would switch to that. CCP spoke with wife, and she is home sick with the flu. She is agreeable to rehab at [facility] at [place]. She has questions about insurance and copays. CCP explained would have [facility] call her and discuss. Updated APRN and RN. Awaiting nutrition consult. Packet in ccp office. Danelle SNYDER/CCP                   Discharge Codes    No documentation.                 Expected Discharge Date and Time       Expected Discharge Date Expected Discharge Time    Janes 10, 2024               Monica Russell, RN

## 2024-01-09 NOTE — NURSING NOTE
Patient found drinking non-thickened chocolate milk that was brought in by family at bedside. Educated patient and family on diet recommendation of Nectar thick liquids. Educated on the risk of aspiration when drinking thin liquids prior to being cleared by speech therapy. Patient and family voiced understanding.     Patient also stated that he feels too full to eat dinner. Voicemail left for nutrition for their recommendation on nocturnal feedings.?

## 2024-01-09 NOTE — PLAN OF CARE
Goal Outcome Evaluation:  Plan of Care Reviewed With: patient           Outcome Evaluation: Pt reports he feels very weak and stiff this date. Pt does seem to be more rigid with movement and had stronger posterior lean with standing resulting in difficulty with weight shifting and advancing his feet. PT eductated pt on performing B LE exercises in bed. PT will continue to follow to address strength, mobility, and gait.      Anticipated Discharge Disposition (PT): skilled nursing facility

## 2024-01-09 NOTE — PLAN OF CARE
Goal Outcome Evaluation:      VFSS completed - patient presents with mild pharyngeal dysphagia and is recommended to remain on soft to chew diet/NTL, meds whole or crushed w/NTL; upright for PO and for 30-45 min post eating, alternate liquids/solids.  ST to follow.  Pt is at risk for aspiration due to history of Mandel's esophagus with concern for reverse aspiration post swallow.                          SLP Swallowing Diagnosis: mild, suspected pharyngeal dysphagia, esophageal dysphagia (01/09/24 7917)

## 2024-01-09 NOTE — PLAN OF CARE
Goal Outcome Evaluation:  Plan of Care Reviewed With: patient        Progress: no change       Orthostatics BP's remain negative. Care given as per orders. No distress noted. No complaints voiced. WCTM.

## 2024-01-09 NOTE — THERAPY TREATMENT NOTE
Patient Name: Madhu Santoro  : 1945    MRN: [id number]                              Today's Date: 2024       Admit Date: 2024    Visit Dx:     ICD-10-CM ICD-9-CM   1. Generalized weakness  R53.1 780.79   2. Unable to ambulate  R26.2 719.7   3. Bandemia  D72.825 288.66   4. Orthostatic hypotension  I95.1 458.0   5. Neuropathy  G62.9 355.9     Patient Active Problem List   Diagnosis    Anxiety    Arthritis    Coronary artery disease due to lipid rich plaque    HLD (hyperlipidemia)    Benign essential hypertension    DDD (degenerative disc disease), lumbosacral    Cerebrovascular accident    Encounter for screening for cardiovascular disorders    Gastroesophageal reflux disease    Hyperlipidemia    Stenosis of carotid artery    Former smoker    History of cardiac catheterization    S/P ablation of atrial flutter    Typical atrial flutter    S/P CABG (coronary artery bypass graft)    Adenomatous polyp of ascending colon    Abdominal bloating    Stroke    PAD (peripheral artery disease)    Acute cholecystitis    Right upper quadrant abdominal pain    Chronic pain of both hips    Chronic bilateral low back pain without sciatica    Sacroiliac joint dysfunction of both sides    Encounter for long-term (current) use of high-risk medication    Lumbar facet arthropathy    Stroke-like symptoms    CVA (cerebral vascular accident)    Personal history of colonic polyps    Arthralgia of multiple joints    Iron deficiency    Thrombocytosis    Left ureteral stone    Obstructive uropathy    Chronic anticoagulation    Facial skin lesion    Iron deficiency anemia    Polycythemia    Neuropathy    Weakness    Pneumonia    Close exposure to COVID-19 virus    Polycythemia vera    Chronic gout without tophus    COVID-19    Cytokine release syndrome, grade 2    Anemia    History of CVA (cerebrovascular accident)    S/P percutaneous endoscopic gastrostomy (PEG) tube placement    Mandel esophagus    Sacral decubitus ulcer     "Oral phase dysphagia    Acute blood loss anemia    Orthostatic dizziness    Orthostatic hypotension    Bilateral leg weakness     Past Medical History:   Diagnosis Date    Anxiety     Arthritis     Atrial flutter     Status post cavotricuspid isthmus ablation by Dr. Gustafson on 4/18/17    Mandel esophagus     Benign essential hypertension     CAD (coronary artery disease)     3 vessel CABG 4/11/17 by Dr. Cortez: ROSARIO-prox LAD, SVG-OM1, SVG-OM3    Carotid artery disease     Status post carotid endarterectomy - USG 4/10/17: 50-59% NICHELLE, 1-15% LICA.     Colonic polyp     Cyst of pancreas     DDD (degenerative disc disease), lumbosacral     GERD (gastroesophageal reflux disease)     H/O bone density study 2013    H/O complete eye exam 2014    History of kidney stone     HLD (hyperlipidemia)     Hypertension     Kidney stone     8/22/22    Lipid screening 05/31/2013    Low back pain     [facility]    Screening for prostate cancer 07/07/2015    Skin cancer     nose    Stroke     RESIDUAL--\"BALANCE ISSUES\"     Past Surgical History:   Procedure Laterality Date    CARDIAC CATHETERIZATION N/A 04/10/2017    Procedure: Left Heart Cath;  Surgeon: Marjorie Healy MD;  Location:  [facility];  Service:     CARDIAC CATHETERIZATION N/A 04/10/2017    Procedure: Coronary angiography;  Surgeon: Marjorie Healy MD;  Location:  [facility];  Service:     CARDIAC CATHETERIZATION N/A 04/10/2017    Procedure: Left ventriculography;  Surgeon: Marjorie Healy MD;  Location:  [facility];  Service:     CARDIAC CATHETERIZATION  2011    CARDIAC ELECTROPHYSIOLOGY PROCEDURE N/A 04/18/2017    Procedure: Ablation atrial flutter;  Surgeon: Jose Antonio Gustafson MD;  Location:  [facility];  Service:     CAROTID ENDARTERECTOMY      CHOLECYSTECTOMY      CHOLECYSTECTOMY WITH INTRAOPERATIVE CHOLANGIOGRAM N/A 09/07/2019    Procedure: Laparoscopic cholecystectomy " with intraoperative cholangiogram;  Surgeon: Gauri Travis MD;  Location: [facility] MAIN OR;  Service: General    COLONOSCOPY  01/06/2015    Diverticulosis, one TA    COLONOSCOPY N/A 02/14/2019    tics, NBIH, adenomatous polyp x 2    COLONOSCOPY N/A 11/05/2021    Procedure: COLONOSCOPY TO CECUM AND TERM. ILEUM WITH COLD POLYPECTOMIES;  Surgeon: Everton Abel MD;  [facility];  Service: Gastroenterology;  Laterality: N/A;  PRE OP - PERS H/O POLYPS  POST OP - COLON POLYPS,, DIVERTICULOSIS, HEMORRHOIDS    CORONARY ARTERY BYPASS GRAFT N/A 04/11/2017    Procedure: AR STERNOTOMY CORONARY ARTERY BYPASS GRAFT TIMES 3 USING LEFT INTERNAL MAMMARY ARTERY AND LEFT GREATER SAPHENOUS VEIN GRAFT PER ENDOSCOPIC VEIN HARVESTING AND PRP ;  Surgeon: Temo Cortez MD;  Location: [facility] MAIN OR;  Service:     ENDOSCOPY  01/06/2015    HH, Ervin's esophagus    ENDOSCOPY N/A 02/14/2019    Z line irregular, HH, Ervin's esophagus    ENDOSCOPY N/A 11/05/2021    Procedure: ESOPHAGOGASTRODUODENOSCOPY WITH BIOPSIES;  Surgeon: Everton Abel MD;  [facility];  Service: Gastroenterology;  Laterality: N/A;  PRE OP - PERS H/O ERVIN'S  POST OP - IRREG Z LINE    ENDOSCOPY W/ PEG TUBE PLACEMENT N/A 11/6/2023    Procedure: ESOPHAGOGASTRODUODENOSCOPY WITH PERCUTANEOUS ENDOSCOPIC GASTROSTOMY TUBE INSERTION;  Surgeon: Temo Ramsey MD;  Location: [facility] ENDOSCOPY;  Service: General;  Laterality: N/A;  Pre: dysphagia  Post: same    KNEE SURGERY Left     URETEROSCOPY LASER LITHOTRIPSY WITH STENT INSERTION Left 8/23/2022    Procedure: Cysto retrograde with left uretro stent placement;  Surgeon: Damien Oliveira MD;  Location: [facility] MAIN OR;  Service: Urology;  Laterality: Left;    VASECTOMY        General Information       Row Name 01/09/24 5637          Physical Therapy Time and Intention    Document Type therapy note (daily note)  -CH     Mode of Treatment individual therapy;physical therapy   -       Row Name 01/09/24 1552          General Information    Existing Precautions/Restrictions fall  -       Row Name 01/09/24 1552          Cognition    Orientation Status (Cognition) oriented x 3  -       Row Name 01/09/24 1552          Safety Issues, Functional Mobility    Impairments Affecting Function (Mobility) balance;endurance/activity tolerance;strength  -               User Key  (r) = Recorded By, (t) = Taken By, (c) = Cosigned By      Initials Name Provider Type     Blanca Elkins, PT Physical Therapist                   Mobility       Row Name 01/09/24 1553          Bed Mobility    Bed Mobility supine-sit;sit-supine  -     Supine-Sit [place] (Bed Mobility) verbal cues;nonverbal cues (demo/gesture);moderate assist (50% patient effort)  -     Sit-Supine [place] (Bed Mobility) verbal cues;nonverbal cues (demo/gesture);moderate assist (50% patient effort)  -     Assistive Device (Bed Mobility) bed rails;head of bed elevated  -       Row Name 01/09/24 1553          Sit-Stand Transfer    Sit-Stand [place] (Transfers) verbal cues;nonverbal cues (demo/gesture);moderate assist (50% patient effort)  -     Assistive Device (Sit-Stand Transfers) walker, front-wheeled  -       Row Name 01/09/24 1553          Gait/Stairs (Locomotion)    [place] Level (Gait) verbal cues;nonverbal cues (demo/gesture);moderate assist (50% patient effort)  -     Assistive Device (Gait) walker, front-wheeled  -     Distance in Feet (Gait) 3 side steps to HOB  -     Deviations/Abnormal Patterns (Gait) jemal decreased;gait speed decreased;stride length decreased  -     Bilateral Gait Deviations forward flexed posture  -     Comment, (Gait/Stairs) pt with posterior lean, difficulty with weight shifting and advancing R foot  -               User Key  (r) = Recorded By, (t) = Taken By, (c) = Cosigned By      Initials Name Provider Type    Blanca Fonseca, PT Physical Therapist                    Obj/Interventions       Row Name 01/09/24 1555          Motor Skills    Therapeutic Exercise --  10 reps B LE AP, LAQ, seated marches  -               User Key  (r) = Recorded By, (t) = Taken By, (c) = Cosigned By      Initials Name Provider Type    Blanca Fonseca, PT Physical Therapist                   Goals/Plan    No documentation.                  Clinical Impression       [facility] Name 01/09/24 1555          Pain    Pretreatment Pain Rating 0/10 - no pain  -     Posttreatment Pain Rating 0/10 - no pain  -       Row Name 01/09/24 1555          Plan of Care Review    Plan of Care Reviewed With patient  -     Outcome Evaluation Pt reports he feels very weak and stiff this date. Pt does seem to be more rigid with movement and had stronger posterior lean with standing resulting in difficulty with weight shifting and advancing his feet. PT eductated pt on performing B LE exercises in bed. PT will continue to follow to address strength, mobility, and gait.  -CH       Row Name 01/09/24 1555          Positioning and Restraints    Pre-Treatment Position in bed  -     Post Treatment Position bed  -     In Bed supine;call light within reach;encouraged to call for assist;exit alarm on  -               User Key  (r) = Recorded By, (t) = Taken By, (c) = Cosigned By      Initials Name Provider Type    Blanca Fonseca, PT Physical Therapist                   Outcome Measures       Row Name 01/09/24 1557 01/09/24 1400       How much help from another person do you currently need...    Turning from your back to your side while in flat bed without using bedrails? 2  -CH 2  -LV    Moving from lying on back to sitting on the side of a flat bed without bedrails? 2  -CH 2  -LV    Moving to and from a bed to a chair (including a wheelchair)? 2  -CH 2  -LV    Standing up from a chair using your arms (e.g., wheelchair, bedside chair)? 2  -CH 2  -LV    Climbing 3-5 steps with a railing? 1  -CH 1   -LV    To walk in hospital room? 1  -CH 2  -LV    AM-PAC 6 Clicks Score (PT) 10  -CH 11  -LV    Highest Level of Mobility Goal 4 --> Transfer to chair/commode  -CH 4 --> Transfer to chair/commode  -LV      Row Name 01/09/24 0800          How much help from another person do you currently need...    Turning from your back to your side while in flat bed without using bedrails? 2  -LV     Moving from lying on back to sitting on the side of a flat bed without bedrails? 2  -LV     Moving to and from a bed to a chair (including a wheelchair)? 3  -LV     Standing up from a chair using your arms (e.g., wheelchair, bedside chair)? 3  -LV     Climbing 3-5 steps with a railing? 1  -LV     To walk in hospital room? 2  -LV     AM-PAC 6 Clicks Score (PT) 13  -LV     Highest Level of Mobility Goal 4 --> Transfer to chair/commode  -LV       Row Name 01/09/24 1557          Functional Assessment    Outcome Measure Options AM-PAC 6 Clicks Basic Mobility (PT)  -               User Key  (r) = Recorded By, (t) = Taken By, (c) = Cosigned By      Initials Name Provider Type     Blanca Elkins, PT Physical Therapist     Francia Camarillo, RN Registered Nurse                                 Physical Therapy Education       Title: PT OT SLP Therapies (Done)       Topic: Physical Therapy (Done)       Point: Mobility training (Done)       Learning Progress Summary             Patient Acceptance, E,TB,D, VU,NR by  at 1/9/2024 1557    Acceptance, E,TB,D, VU,NR by  at 1/8/2024 1010                         Point: Home exercise program (Done)       Learning Progress Summary             Patient Acceptance, E,TB,D, VU,NR by  at 1/9/2024 1557    Acceptance, E,TB,D, VU,NR by  at 1/8/2024 1010                         Point: Body mechanics (Done)       Learning Progress Summary             Patient Acceptance, E,TB,D, VU,NR by  at 1/9/2024 1557    Acceptance, E,TB,D, VU,NR by  at 1/8/2024 1010                         Point:  Precautions (Done)       Learning Progress Summary             Patient Acceptance, E,TB,D, VU,NR by  at 1/9/2024 1557    Acceptance, E,TB,D, VU,NR by  at 1/8/2024 1010                                         User Key       Initials Effective Dates Name Provider Type [facility] 06/16/21 -  Blanca Elkins PT Physical Therapist PT                  PT Recommendation and Plan  Planned Therapy Interventions (PT): balance training, bed mobility training, gait training, home exercise program, patient/family education, strengthening, transfer training  Plan of Care Reviewed With: patient  Outcome Evaluation: Pt reports he feels very weak and stiff this date. Pt does seem to be more rigid with movement and had stronger posterior lean with standing resulting in difficulty with weight shifting and advancing his feet. PT eductated pt on performing B LE exercises in bed. PT will continue to follow to address strength, mobility, and gait.     Time Calculation:         PT Charges       Row Name 01/09/24 1557             Time Calculation    Start Time 1502  -      Stop Time 1525  -      Time Calculation (min) 23 min  -      PT Received On 01/09/24  -      PT - Next Appointment 01/10/24  -         Time Calculation- PT    Total Timed Code Minutes- PT 23 minute(s)  -         Timed Charges    64951 - PT Therapeutic Exercise Minutes 10  -      61753 - PT Therapeutic Activity Minutes 13  -         Total Minutes    Timed Charges Total Minutes 23  -       Total Minutes 23  -CH                User Key  (r) = Recorded By, (t) = Taken By, (c) = Cosigned By      Initials Name Provider Type     Blanca Elkins PT Physical Therapist                  Therapy Charges for Today       Code Description Service Date Service Provider Modifiers Qty    25592459267  PT THERAPEUTIC ACT EA 15 MIN 1/8/2024 Blanca Elkins PT GP 1    [id number]  PT EVAL MOD COMPLEXITY 2 1/8/2024 Blanca Elkins, PT GP 1     [id number]  PT THER PROC EA 15 MIN 1/9/2024 Blanca Elkins, PT GP 1    [id number] HC PT THERAPEUTIC ACT EA 15 MIN 1/9/2024 Blanca Elkins, PT GP 1            PT G-Codes  Outcome Measure Options: AM-PAC 6 Clicks Basic Mobility (PT)  AM-PAC 6 Clicks Score (PT): 10  AM-PAC 6 Clicks Score (OT): 16  PT Discharge Summary  Anticipated Discharge Disposition (PT): skilled nursing facility    Blanca Elkins, PT  1/9/2024

## 2024-01-09 NOTE — PROGRESS NOTES
Name: Madhu Santoro ADMIT: 2024   : 1945  PCP: Damian Sanchez MD    MRN: [id number] LOS: 0 days   AGE/SEX: 78 y.o. male  ROOM: [facility]     Subjective   Subjective   Resting in bed.  No family at bedside.  He went for a VFSS this morning and has been placed on modified diet with nectar thick liquids.  Patient reports some dismay with thickened liquids.  He denies any chest pain, dyspnea, cough, fever or chills.  Denies any nausea, vomiting or abdominal pain.  He continues to state that his legs feel like they weigh 100 pounds each.  He denies any new or worsening back pain as well as new or worsening paresthesias.  He denies any unilateral weakness and denies any pain with raising his legs.  States that rehab has not been super effective for him in the past and that his wife is little and unable to assist him if he were to fall at home.     Objective   Objective   Vital Signs  Temp:  [98.1 °F (36.7 °C)-98.5 °F (36.9 °C)] 98.5 °F (36.9 °C)  Heart Rate:  [] 90  Resp:  [18] 18  BP: (146-168)/(63-76) 151/72  SpO2:  [91 %-96 %] 91 %  on   ;   Device (Oxygen Therapy): room air  Body mass index is 25.23 kg/m².    Physical Exam  Vitals and nursing note reviewed.   Constitutional:       Appearance: He is ill-appearing. He is not toxic-appearing.   Cardiovascular:      Rate and Rhythm: Normal rate and regular rhythm.      Pulses: Normal pulses.   Pulmonary:      Effort: Pulmonary effort is normal. No respiratory distress.   Abdominal:      General: Bowel sounds are normal. There is no distension.      Palpations: Abdomen is soft.      Tenderness: There is no abdominal tenderness.      Comments: PEG present   Musculoskeletal:         General: Swelling (Mild bilateral lower extremities) present. Normal range of motion.      Cervical back: Normal range of motion and neck supple.   Skin:     General: Skin is warm and dry.      Findings: No bruising.   Neurological:      General: No focal deficit present.       Mental Status: He is alert and oriented to person, place, and time.      Sensory: No sensory deficit.      Motor: Weakness of both lower extremities present.      Coordination: Coordination normal.   Psychiatric:         Mood and Affect: Mood normal.         Behavior: Behavior normal.     Results Review:       I reviewed the patient's new clinical results.  Results from last 7 days   Lab Units 01/09/24  0320 01/08/24  0429 01/07/24  0928   WBC 10*3/mm3 13.18* 10.15 13.52*   HEMOGLOBIN g/dL 11.3* 10.4* 12.7*   PLATELETS 10*3/mm3 491* 455* 498*     Results from last 7 days   Lab Units 01/09/24  0320 01/08/24  0700 01/07/24 0928   SODIUM mmol/L 139 139 139   POTASSIUM mmol/L 3.6 3.9 4.3   CHLORIDE mmol/L 106 105 101   CO2 mmol/L 22.2 22.7 27.0   BUN mg/dL 11 13 11   CREATININE mg/dL 0.50* 0.59* 0.66*   GLUCOSE mg/dL 96 123* 164*   Estimated Creatinine Clearance: 127.6 mL/min (A) (by C-G formula based on SCr of 0.5 mg/dL (L)).  Results from last 7 days   Lab Units 01/08/24  0700 01/07/24  0928   ALBUMIN g/dL 2.9* 3.8   BILIRUBIN mg/dL 0.3 0.3   ALK PHOS U/L 136* 153*   AST (SGOT) U/L 30 39   ALT (SGPT) U/L 17 17     Results from last 7 days   Lab Units 01/09/24  0320 01/08/24 0700 01/07/24  0928   CALCIUM mg/dL 8.6 8.8 9.7   ALBUMIN g/dL  --  2.9* 3.8   MAGNESIUM mg/dL  --   --  2.0     Results from last 7 days   Lab Units 01/07/24  1227 01/07/24 0928   PROCALCITONIN ng/mL  --  0.13   LACTATE mmol/L 0.9  --      Glucose   Date/Time Value Ref Range Status   01/09/2024 0532 139 (H) 70 - 130 mg/dL Final   01/08/2024 2332 139 (H) 70 - 130 mg/dL Final   01/08/2024 1613 107 70 - 130 mg/dL Final   01/08/2024 1115 105 70 - 130 mg/dL Final   01/08/2024 0550 96 70 - 130 mg/dL Final   01/08/2024 0028 113 70 - 130 mg/dL Final   01/07/2024 2052 95 70 - 130 mg/dL Final       amitriptyline, 50 mg, Per G Tube, Nightly  amLODIPine, 5 mg, Per G Tube, Daily  apixaban, 2.5 mg, Per G Tube, Q12H  [START ON 1/31/2024] cyanocobalamin,  1,000 mcg, Intramuscular, Q30 Days  HYDROcodone-acetaminophen, 1 tablet, Per G Tube, TID  lansoprazole, 30 mg, Per G Tube, BID AC  Menthol-Zinc Oxide, 1 application , Topical, Q12H  potassium chloride, 40 mEq, Oral, Q4H  rosuvastatin, 20 mg, Per G Tube, Nightly  sodium chloride, 10 mL, Intravenous, Q12H  sucralfate, 1 g, Per G Tube, BID AC      sodium chloride, 50 mL/hr, Last Rate: 50 mL/hr (01/09/24 0629)    Diet: Regular/House Diet; No Mixed Consistencies; Texture: Soft to Chew (NDD 3); Soft to Chew: Ground Meat; Fluid Consistency: Nectar Thick       Assessment/Plan     Active Hospital Problems    Diagnosis  POA    **Orthostatic dizziness [R42]  Yes    Oral phase dysphagia [R13.11]  Yes    History of CVA (cerebrovascular accident) [Z86.73]  Not Applicable    S/P percutaneous endoscopic gastrostomy (PEG) tube placement [Z93.1]  Not Applicable    Polycythemia vera [D45]  Yes    Iron deficiency anemia [D50.9]  Yes    Chronic anticoagulation [Z79.01]  Not Applicable    S/P ablation of atrial flutter [Z98.890, Z86.79]  Not Applicable    Typical atrial flutter [I48.3]  Yes    Gastroesophageal reflux disease [K21.9]  Yes    DDD (degenerative disc disease), lumbosacral [M51.37]  Yes    Benign essential hypertension [I10]  Yes    Coronary artery disease due to lipid rich plaque [I25.10, I25.83]  Yes      Resolved Hospital Problems   No resolved problems to display.     Mr. Santoro is a 78 y.o. male that presented to the hospital for weakness and falls.  He was recently admitted to this facility at the end of November when he was found to have weakness and worsening anemia.  He was found to have a GI bleed most likely related to recent PEG tube placement and related anticoagulation with Eliquis.  He was maintained on a lower dose of Eliquis at that point as family did not want to pursue endoscopy.  He was noted to have a decubitus ulcer as well as dysphagia.  He was maintained on bolus feeds and discharged to SNF for  strengthening.  He was to follow-up with Dr. Daly with hematology given his history of polycythemia.  He was apparently discharged from SNF 1/5 and returned home with family.  He continued to be weak and having issues ambulating with a walker.  His legs gave out on him and he suffered a mechanical fall hitting the left side of his head.  He was noted to have some orthostatic hypotension and admitted for further evaluation.     Orthostatic dizziness  Mechanical fall  -Given IV hydration with improvement. Will saline lock fluids.  -Sounds like he has some chronicity to dizziness with position changes.  Recommend safety precautions.  -May benefit from compression hose.  -CT head stable from prior with no acute findings or traumatic etiology.  -PT/OT following-looks like SNF recommended, but wonder if he may need long-term care at this point.     Dysphagia  PEG tube in place  History of CVA  -Chest x-ray on admission negative for acute findings.  -Neurologically intact.  Will monitor.  -Continue low-dose Eliquis and statin.  Aspirin stopped last admission due to anemia and possible PEG bleeding.  -Continue tube feedings from prior.  -Aspiration precautions  -VFSS 1/9 per ST with recommendations of soft to chew/NTL. Meds whole or crushed with NTL, upright for PO and for 30-45 minutes post meal, alternate liquid/solids.     Iron-deficiency anemia  Polycythemia vera  Thrombocytosis  -With recent GI bleeding.  Continue PPI Carafate.  -Hemoglobin much improved from prior.  Will trend.  -Continue outpatient follow-up with Dr. Daly with hematology.  -Platelets mildly elevated this admission. ? Reactive. Will monitor trends.     CAD  Hypertension  History of atrial fibrillation with ablation  -Followed by [place] [facility]ology in the past.  -Continue low-dose Eliquis and statin.  Continue Norvasc.  -Monitor for any cardiac complaints.  -Monitor on telemetry    Bilateral lower extremity weakness  -X-ray lumbar spine with  mild retrolisthesis of L1 on L2, L2 and L3.  Vertebral body heights stable with no acute fractures.  Does have multilevel DDD.  -Overall, patient reports leg heaviness bilaterally. Denies any paraesthesias, radicular pain or unilateral symptoms.  He denies any new or worsening back pain in the setting of chronic lumbar degenerative disease.  No pain with leg raises.  -Suspect this is primarily muscular weakness/deconditioning. Will add TSH, vitamin B12 and folate for other potential sources of weakness.  -Check venous dopplers given reported edema (mild on exam).     Discussed with patient and CCP.    WBC up a little today. Afebrile, no new infectious complaints or hypoxia. Will repeat in AM and monitor closely.       VTE Prophylaxis - Eliquis (home med)  Code Status - NO CPR/limited per admitting provider.  Disposition - Anticipate discharge next 1-2 days. Could be medically ready as of tomorrow pending labs. CCP to discuss possible rehab options with family.    MY Mondragon  [facility]  01/09/24  09:55 EST

## 2024-01-09 NOTE — MBS/VFSS/FEES
Acute Care - Speech Language Pathology   Swallow Initial Evaluation [facility]     Patient Name: Madhu Santoro  : 1945  MRN: [id number]  Today's Date: 2024               Admit Date: 2024    Visit Dx:     ICD-10-CM ICD-9-CM   1. Generalized weakness  R53.1 780.79   2. Unable to ambulate  R26.2 719.7   3. Bandemia  D72.825 288.66   4. Orthostatic hypotension  I95.1 458.0   5. Neuropathy  G62.9 355.9     Patient Active Problem List   Diagnosis    Anxiety    Arthritis    Coronary artery disease due to lipid rich plaque    HLD (hyperlipidemia)    Benign essential hypertension    DDD (degenerative disc disease), lumbosacral    Cerebrovascular accident    Encounter for screening for cardiovascular disorders    Gastroesophageal reflux disease    Hyperlipidemia    Stenosis of carotid artery    Former smoker    History of cardiac catheterization    S/P ablation of atrial flutter    Typical atrial flutter    S/P CABG (coronary artery bypass graft)    Adenomatous polyp of ascending colon    Abdominal bloating    Stroke    PAD (peripheral artery disease)    Acute cholecystitis    Right upper quadrant abdominal pain    Chronic pain of both hips    Chronic bilateral low back pain without sciatica    Sacroiliac joint dysfunction of both sides    Encounter for long-term (current) use of high-risk medication    Lumbar facet arthropathy    Stroke-like symptoms    CVA (cerebral vascular accident)    Personal history of colonic polyps    Arthralgia of multiple joints    Iron deficiency    Thrombocytosis    Left ureteral stone    Obstructive uropathy    Chronic anticoagulation    Facial skin lesion    Iron deficiency anemia    Polycythemia    Neuropathy    Weakness    Pneumonia    Close exposure to COVID-19 virus    Polycythemia vera    Chronic gout without tophus    COVID-19    Cytokine release syndrome, grade 2    Anemia    History of CVA (cerebrovascular accident)    S/P percutaneous endoscopic gastrostomy (PEG)  "tube placement    Mandel esophagus    Sacral decubitus ulcer    Oral phase dysphagia    Acute blood loss anemia    Orthostatic dizziness     Past Medical History:   Diagnosis Date    Anxiety     Arthritis     Atrial flutter     Status post cavotricuspid isthmus ablation by Dr. Gustafson on 4/18/17    Mandel esophagus     Benign essential hypertension     CAD (coronary artery disease)     3 vessel CABG 4/11/17 by Dr. Cortez: ROSARIO-prox LAD, SVG-OM1, SVG-OM3    Carotid artery disease     Status post carotid endarterectomy - USG 4/10/17: 50-59% NICHELLE, 1-15% LICA.     Colonic polyp     Cyst of pancreas     DDD (degenerative disc disease), lumbosacral     GERD (gastroesophageal reflux disease)     H/O bone density study 2013    H/O complete eye exam 2014    History of kidney stone     HLD (hyperlipidemia)     Hypertension     Kidney stone     8/22/22    Lipid screening 05/31/2013    Low back pain     [facility]    Screening for prostate cancer 07/07/2015    Skin cancer     nose    Stroke     RESIDUAL--\"BALANCE ISSUES\"     Past Surgical History:   Procedure Laterality Date    CARDIAC CATHETERIZATION N/A 04/10/2017    Procedure: Left Heart Cath;  Surgeon: Marjorie Healy MD;  Location:  [facility];  Service:     CARDIAC CATHETERIZATION N/A 04/10/2017    Procedure: Coronary angiography;  Surgeon: Marjorie Healy MD;  Location:  [facility];  Service:     CARDIAC CATHETERIZATION N/A 04/10/2017    Procedure: Left ventriculography;  Surgeon: Marjorie Healy MD;  Location:  [facility];  Service:     CARDIAC CATHETERIZATION  2011    CARDIAC ELECTROPHYSIOLOGY PROCEDURE N/A 04/18/2017    Procedure: Ablation atrial flutter;  Surgeon: Jose Antonio Gustafson MD;  Location:  [facility];  Service:     CAROTID ENDARTERECTOMY      CHOLECYSTECTOMY      CHOLECYSTECTOMY WITH INTRAOPERATIVE CHOLANGIOGRAM N/A 09/07/2019    Procedure: Laparoscopic " cholecystectomy with intraoperative cholangiogram;  Surgeon: Gauri Travis MD;  Location: [facility] MAIN OR;  Service: General    COLONOSCOPY  01/06/2015    Diverticulosis, one TA    COLONOSCOPY N/A 02/14/2019    tics, NBIH, adenomatous polyp x 2    COLONOSCOPY N/A 11/05/2021    Procedure: COLONOSCOPY TO CECUM AND TERM. ILEUM WITH COLD POLYPECTOMIES;  Surgeon: Everton Abel MD;  [facility];  Service: Gastroenterology;  Laterality: N/A;  PRE OP - PERS H/O POLYPS  POST OP - COLON POLYPS,, DIVERTICULOSIS, HEMORRHOIDS    CORONARY ARTERY BYPASS GRAFT N/A 04/11/2017    Procedure: AR STERNOTOMY CORONARY ARTERY BYPASS GRAFT TIMES 3 USING LEFT INTERNAL MAMMARY ARTERY AND LEFT GREATER SAPHENOUS VEIN GRAFT PER ENDOSCOPIC VEIN HARVESTING AND PRP ;  Surgeon: Temo Cortez MD;  Location: [facility] MAIN OR;  Service:     ENDOSCOPY  01/06/2015    HH, Ervin's esophagus    ENDOSCOPY N/A 02/14/2019    Z line irregular, HH, Ervin's esophagus    ENDOSCOPY N/A 11/05/2021    Procedure: ESOPHAGOGASTRODUODENOSCOPY WITH BIOPSIES;  Surgeon: Everton Abel MD;  Location: [facility] ENDOSCOPY;  Service: Gastroenterology;  Laterality: N/A;  PRE OP - PERS H/O ERVIN'S  POST OP - IRREG Z LINE    ENDOSCOPY W/ PEG TUBE PLACEMENT N/A 11/6/2023    Procedure: ESOPHAGOGASTRODUODENOSCOPY WITH PERCUTANEOUS ENDOSCOPIC GASTROSTOMY TUBE INSERTION;  Surgeon: Temo Ramsey MD;  [facility];  Service: General;  Laterality: N/A;  Pre: dysphagia  Post: same    KNEE SURGERY Left     URETEROSCOPY LASER LITHOTRIPSY WITH STENT INSERTION Left 8/23/2022    Procedure: Cysto retrograde with left uretro stent placement;  Surgeon: Damien Oliveria MD;  Location: [facility] MAIN OR;  Service: Urology;  Laterality: Left;    VASECTOMY         SLP Recommendation and Plan  SLP Swallowing Diagnosis: mild, suspected pharyngeal dysphagia, esophageal dysphagia (01/09/24 0845)                    Swallow Criteria for Skilled  Therapeutic Interventions Met: demonstrates skilled criteria (01/09/24 0845)     Rehab Potential/Prognosis, Swallowing: adequate, monitor progress closely (01/09/24 0845)                                                           SWALLOW EVALUATION (last 72 hours)       SLP Adult Swallow Evaluation       Row Name 01/09/24 0845 01/08/24 1400                Rehab Evaluation    Document Type evaluation  -CB evaluation  -CR       Subjective Information no complaints  -CB no complaints  -CR       Patient Observations alert;cooperative;agree to therapy  -CB alert  -CR       Patient Effort good  -CB adequate  -CR       Symptoms Noted During/After Treatment -- none  -CR          General Information    Patient Profile Reviewed yes  -CB yes  -CR       Pertinent History Of Current Problem 77 y/o male admitted due to balance issues resulting in recurrent falls. Initial work up noted mildly elevated white blood cell count. PMH includes CVA, dysphagia with PEG placement, recent discharge from rehab facility. VFSS completed 11/3/23 with NPO recs. PEG placed 11/6/23.  Pt currently on soft to chew and NTL.  CXR clear.  -CB 77 y/o male admitted due to balance issues resulting in recurrent falls. Initial work up noted mildly elevated white blood cell count. PMH includes CVA, dysphagia with PEG placement, recent discharge from rehab facility. VFSS completed 11/3/23 with NPO recs. PEG placed 11/6/23.  -CR       Current Method of Nutrition soft to chew textures;nectar/syrup-thick liquids  -CB NPO  -CR       Precautions/Limitations, Vision WFL;for purposes of eval  -CB WFL;for purposes of eval  -CR       Precautions/Limitations, Hearing WFL;for purposes of eval  -CB WFL;for purposes of eval  -CR       Prior Level of Function-Communication WFL  -CB --       Prior Level of Function-Swallowing NPO;alternative feeding method  -CB --  Regular/thin per patient and wife. Discharged 11/2023 from [facility] NPO with PEG.  -CR       Plans/Goals Discussed  with patient;agreed upon  -CB patient and family;agreed upon  -CR       Barriers to Rehab none identified  -CB none identified  -CR       Patient's Goals for Discharge patient did not state  -CB --          Pain    Additional Documentation -- Pain Scale: Numbers Pre/Post-Treatment (Group)  -CR          Pain Scale: Numbers Pre/Post-Treatment    Pretreatment Pain Rating -- 0/10 - no pain  -CR       Posttreatment Pain Rating -- 0/10 - no pain  -CR          Oral Motor Structure and Function    Dentition Assessment -- missing teeth  -CR       Secretion Management -- WNL/WFL  -CR       Mucosal Quality -- moist, healthy  -CR          Clinical Swallow Eval    Clinical Swallow Evaluation Summary -- Clinical swallow evaluation completed. Wife at bedside. PEG placed previous hospitalization. Patient and wife eager for diet initiation stating patient completed mobile VFSS through third party prior to SNF discharge and passed for regular solid diet; SLP unable to locate records. Previous hospitalization in November 2023 recommended NPO with PEG feeds. CXR indicated clear lungs. At bedside, patient presented with wet vocal quality with ice chip, throat clear with thin liquid by spoon, and wet vocal quality/cough with thin liquid by cup. No overt s/s of aspiration with nectar by spoon/cup/straw, puree, or soft/chopped solid. Prolonged munching mastication due to missing teeth. Recommend mechanical soft diet with nectar thick liquids. Meds whole or crushed with puree or nectar thick liquid. Sitting upright, slow rate, small bites/sips. Speech to follow with VFSS to further assess swallow function.  -CR          MBS/VFSS Interpretation    VFSS Summary Dr. Fuentes, radiologist, present -  Pt is soft spoken but able to follow commands and presents with missing teeth impacting ability to masticate solid foods.  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  Pt presented with thin from cup x 2 with epiglottic  inversion and pharyngeal squeeze appropriate, however, mod residuals remained above pyriforms post swallow vs possible pharyngoceles present.  Pt at risk for aspiration from residuals post swallow.  At beginning of NTL swallow, reflux noted in upper eosphagus, however, cleared with swallow.  No further instances of reflux noted during exam.  Puree and strained mixed with note to have prolonged mastication, however, able to cohesively form bolus for AP transit, oral clearing and swallow initiation.  Pt did have mild residual noted in valleculae with puree. No issues noted with soft bolus during or after swallow.  Regular bolus not presented due to inability to effectively masticate.  Pt presents with mild pharyngeal dysphagia and is recommended for soft to chew/NTL, meds whole or crushed with NTL, upright for all PO and remain upright for 30-45 min post PO, alternate liquid/solid.  ST to follow  -CB --          SLP Communication to Radiology    Summary Statement Dr. Fuentes, radiologist, present - Pt is soft spoken but able to follow commands and presents with missing teeth impacting ability to masticate solid foods. 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 Pt presented with thin from cup x 2 with epiglottic inversion and pharyngeal squeeze appropriate, however, mod residuals remained above pyriforms post swallow vs possible pharyngoceles present. Pt at risk for aspiration from residuals post swallow. At beginning of NTL swallow, reflux noted in upper eosphagus, however, cleared with swallow. No further instances of reflux noted during exam. Puree and strained mixed with note to have prolonged mastication, however, able to cohesively form bolus for AP transit, oral clearing and swallow initiation. Pt did have mild residual noted in valleculae with puree. No issues noted with soft bolus during or after swallow. Regular bolus not presented due to inability to effectively masticate. Pt presents  with mild pharyngeal dysphagia and is recommended for soft to chew/NTL, meds whole or crushed with NTL, upright for all PO and remain upright for 30-45 min post PO, alternate liquid/solid. ST to follow  -CB --          SLP Evaluation Clinical Impression    SLP Swallowing Diagnosis mild;suspected pharyngeal dysphagia;esophageal dysphagia  -CB suspected pharyngeal dysphagia  -CR       Functional Impact risk of aspiration/pneumonia  -CB risk of aspiration/pneumonia  -CR       Rehab Potential/Prognosis, Swallowing adequate, monitor progress closely  -CB re-evaluate goals as necessary  -CR       Swallow Criteria for Skilled Therapeutic Interventions Met demonstrates skilled criteria  -CB demonstrates skilled criteria  -CR          Recommendations    Therapy Frequency (Swallow) -- PRN  -CR       Predicted Duration Therapy Intervention (Days) -- until discharge  -CR       SLP Diet Recommendation -- mechanical ground textures;nectar thick liquids  -CR       Recommended Diagnostics -- reassess via VFSS (MBS)  -CR       Recommended Precautions and Strategies -- upright posture during/after eating;small bites of food and sips of liquid;general aspiration precautions  -CR       Oral Care Recommendations -- Oral Care BID/PRN  -CR       SLP Rec. for Method of Medication Administration -- meds whole;meds crushed;with puree;with thick liquids  -CR       Monitor for Signs of Aspiration -- yes;notify SLP if any concerns  -CR          Swallow Goals (SLP)    Swallow LTGs -- Patient will demonstrate functional swallow for  -CR          (LTG) Patient will demonstrate functional swallow for    Diet Texture (Demonstrate functional swallow) soft to chew (chopped) textures  -CB regular textures  -CR       Liquid viscosity (Demonstrate functional swallow) nectar/ mildly thick liquids  -CB thin liquids  -CR       [place] (Demonstrate functional swallow) independently (over 90% accuracy)  -CB independently (over 90% accuracy)  -CR        Time Frame (Demonstrate functional swallow) -- by discharge  -CR       Progress/Outcomes (Demonstrate functional swallow) new goal  -CB new goal  -CR          (STG) Swallow Compensatory Strategies Goal 1 (SLP)    Activity (Swallow Compensatory Strategies/Techniques Goal 1, SLP) reflux precautions;during meal intake;effortful swallow  -CB --       [place]/Accuracy (Swallow Compensatory Strategies/Techniques Goal 1, SLP) independently (over 90% accuracy)  -CB --       Time Frame (Swallow Compensatory Strategies/Techniques Goal 1, SLP) by discharge  -CB --       Progress/Outcomes (Swallow Compensatory Strategies/Techniques Goal 1, SLP) continuing progress toward goal  -CB --                 User Key  (r) = Recorded By, (t) = Taken By, (c) = Cosigned By      Initials Name Effective Dates    CB Karlie Hinojosa, SLP 01/05/24 -     CR Adry Moise, SLP 08/28/23 -                     EDUCATION  The patient has been educated in the following areas:   Dysphagia (Swallowing Impairment).        SLP GOALS       Row Name 01/09/24 0845 01/08/24 1400          (LTG) Patient will demonstrate functional swallow for    Diet Texture (Demonstrate functional swallow) soft to chew (chopped) textures  -CB regular textures  -CR     Liquid viscosity (Demonstrate functional swallow) nectar/ mildly thick liquids  -CB thin liquids  -CR     [place] (Demonstrate functional swallow) independently (over 90% accuracy)  -CB independently (over 90% accuracy)  -CR     Time Frame (Demonstrate functional swallow) -- by discharge  -CR     Progress/Outcomes (Demonstrate functional swallow) new goal  -CB new goal  -CR        (STG) Swallow Compensatory Strategies Goal 1 (SLP)    Activity (Swallow Compensatory Strategies/Techniques Goal 1, SLP) reflux precautions;during meal intake;effortful swallow  -CB --     [place]/Accuracy (Swallow Compensatory Strategies/Techniques Goal 1, SLP) independently (over 90% accuracy)  -CB --     Time Frame  (Swallow Compensatory Strategies/Techniques Goal 1, SLP) by discharge  -CB --     Progress/Outcomes (Swallow Compensatory Strategies/Techniques Goal 1, SLP) continuing progress toward goal  -CB --               User Key  (r) = Recorded By, (t) = Taken By, (c) = Cosigned By      Initials Name Provider Type    Karlie Marie SLP Speech and Language Pathologist    Adry Lentz SLP Speech and Language Pathologist                       Time Calculation:    Time Calculation- SLP       Row Name 01/09/24 0919             Time Calculation- SLP    SLP Start Time 0845  -CB      SLP Received On 01/09/24  -CB         Untimed Charges    53841-[id number] Motion Fluoro Eval Swallow Minutes 45  -CB         Total Minutes    Untimed Charges Total Minutes 45  -CB       Total Minutes 45  -CB                User Key  (r) = Recorded By, (t) = Taken By, (c) = Cosigned By      Initials Name Provider Type    Karlie Marie SLP Speech and Language Pathologist                    Therapy Charges for Today       Code Description Service Date Service Provider Modifiers Qty    [id number]  ST MOTION FLUORO EVAL SWALLOW 3 1/9/2024 Karlie Hinojosa SLP GN 1                 GONZÁLEZ Trotter  1/9/2024

## 2024-01-10 ENCOUNTER — TELEPHONE (OUTPATIENT)
Dept: FAMILY MEDICINE CLINIC | Facility: CLINIC | Age: 79
End: 2024-01-10
Payer: MEDICARE

## 2024-01-10 ENCOUNTER — APPOINTMENT (OUTPATIENT)
Dept: GENERAL RADIOLOGY | Facility: HOSPITAL | Age: 79
DRG: 312 | End: 2024-01-10
Payer: MEDICARE

## 2024-01-10 ENCOUNTER — APPOINTMENT (OUTPATIENT)
Dept: MRI IMAGING | Facility: HOSPITAL | Age: 79
DRG: 312 | End: 2024-01-10
Payer: MEDICARE

## 2024-01-10 LAB
ANION GAP SERPL CALCULATED.3IONS-SCNC: 11.6 MMOL/L (ref 5–15)
BACTERIA UR QL AUTO: ABNORMAL /HPF
BILIRUB UR QL STRIP: NEGATIVE
BUN SERPL-MCNC: 14 MG/DL (ref 8–23)
BUN/CREAT SERPL: 24.6 (ref 7–25)
CALCIUM SPEC-SCNC: 8.7 MG/DL (ref 8.6–10.5)
CHLORIDE SERPL-SCNC: 106 MMOL/L (ref 98–107)
CLARITY UR: CLEAR
CO2 SERPL-SCNC: 22.4 MMOL/L (ref 22–29)
COLOR UR: YELLOW
CREAT SERPL-MCNC: 0.57 MG/DL (ref 0.76–1.27)
DEPRECATED RDW RBC AUTO: 53.4 FL (ref 37–54)
EGFRCR SERPLBLD CKD-EPI 2021: 99.7 ML/MIN/1.73
ERYTHROCYTE [DISTWIDTH] IN BLOOD BY AUTOMATED COUNT: 15.8 % (ref 12.3–15.4)
FOLATE SERPL-MCNC: >20 NG/ML (ref 4.78–24.2)
GLUCOSE BLDC GLUCOMTR-MCNC: 138 MG/DL (ref 70–130)
GLUCOSE BLDC GLUCOMTR-MCNC: 151 MG/DL (ref 70–130)
GLUCOSE BLDC GLUCOMTR-MCNC: 88 MG/DL (ref 70–130)
GLUCOSE SERPL-MCNC: 122 MG/DL (ref 65–99)
GLUCOSE UR STRIP-MCNC: NEGATIVE MG/DL
HCT VFR BLD AUTO: 34.7 % (ref 37.5–51)
HGB BLD-MCNC: 11 G/DL (ref 13–17.7)
HGB RETIC QN AUTO: 25.7 PG (ref 29.8–36.1)
HGB UR QL STRIP.AUTO: NEGATIVE
HYALINE CASTS UR QL AUTO: ABNORMAL /LPF
IMM RETICS NFR: 27.5 % (ref 3–15.8)
KETONES UR QL STRIP: NEGATIVE
LEUKOCYTE ESTERASE UR QL STRIP.AUTO: ABNORMAL
MCH RBC QN AUTO: 29.3 PG (ref 26.6–33)
MCHC RBC AUTO-ENTMCNC: 31.7 G/DL (ref 31.5–35.7)
MCV RBC AUTO: 92.3 FL (ref 79–97)
NITRITE UR QL STRIP: POSITIVE
PH UR STRIP.AUTO: 6 [PH] (ref 5–8)
PLATELET # BLD AUTO: 441 10*3/MM3 (ref 140–450)
PMV BLD AUTO: 10.4 FL (ref 6–12)
POTASSIUM SERPL-SCNC: 4 MMOL/L (ref 3.5–5.2)
PROCALCITONIN SERPL-MCNC: 0.13 NG/ML (ref 0–0.25)
PROT UR QL STRIP: ABNORMAL
RBC # BLD AUTO: 3.76 10*6/MM3 (ref 4.14–5.8)
RBC # UR STRIP: ABNORMAL /HPF
REF LAB TEST METHOD: ABNORMAL
RETICS # AUTO: 0.12 10*6/MM3 (ref 0.02–0.13)
RETICS/RBC NFR AUTO: 3.06 % (ref 0.7–1.9)
SODIUM SERPL-SCNC: 140 MMOL/L (ref 136–145)
SP GR UR STRIP: 1.02 (ref 1–1.03)
SQUAMOUS #/AREA URNS HPF: ABNORMAL /HPF
TSH SERPL DL<=0.05 MIU/L-ACNC: 2.13 UIU/ML (ref 0.27–4.2)
UROBILINOGEN UR QL STRIP: ABNORMAL
VIT B12 BLD-MCNC: 752 PG/ML (ref 211–946)
WBC # UR STRIP: ABNORMAL /HPF
WBC NRBC COR # BLD AUTO: 13.98 10*3/MM3 (ref 3.4–10.8)

## 2024-01-10 PROCEDURE — 81001 URINALYSIS AUTO W/SCOPE: CPT | Performed by: NURSE PRACTITIONER

## 2024-01-10 PROCEDURE — 84145 PROCALCITONIN (PCT): CPT | Performed by: NURSE PRACTITIONER

## 2024-01-10 PROCEDURE — 80048 BASIC METABOLIC PNL TOTAL CA: CPT | Performed by: NURSE PRACTITIONER

## 2024-01-10 PROCEDURE — 84443 ASSAY THYROID STIM HORMONE: CPT | Performed by: NURSE PRACTITIONER

## 2024-01-10 PROCEDURE — 82948 REAGENT STRIP/BLOOD GLUCOSE: CPT

## 2024-01-10 PROCEDURE — 85027 COMPLETE CBC AUTOMATED: CPT | Performed by: NURSE PRACTITIONER

## 2024-01-10 PROCEDURE — 82746 ASSAY OF FOLIC ACID SERUM: CPT | Performed by: NURSE PRACTITIONER

## 2024-01-10 PROCEDURE — 71045 X-RAY EXAM CHEST 1 VIEW: CPT

## 2024-01-10 PROCEDURE — 82607 VITAMIN B-12: CPT | Performed by: NURSE PRACTITIONER

## 2024-01-10 PROCEDURE — 72148 MRI LUMBAR SPINE W/O DYE: CPT

## 2024-01-10 PROCEDURE — 85046 RETICYTE/HGB CONCENTRATE: CPT | Performed by: INTERNAL MEDICINE

## 2024-01-10 PROCEDURE — 25010000002 CEFTRIAXONE PER 250 MG: Performed by: NURSE PRACTITIONER

## 2024-01-10 PROCEDURE — 99222 1ST HOSP IP/OBS MODERATE 55: CPT | Performed by: INTERNAL MEDICINE

## 2024-01-10 RX ADMIN — ALLOPURINOL 300 MG: 300 TABLET ORAL at 09:22

## 2024-01-10 RX ADMIN — Medication 10 ML: at 10:08

## 2024-01-10 RX ADMIN — LANSOPRAZOLE 30 MG: 15 TABLET, ORALLY DISINTEGRATING ORAL at 16:49

## 2024-01-10 RX ADMIN — HYDROCODONE BITARTRATE AND ACETAMINOPHEN 1 TABLET: 7.5; 325 TABLET ORAL at 09:22

## 2024-01-10 RX ADMIN — SUCRALFATE 1 G: 1 TABLET ORAL at 07:01

## 2024-01-10 RX ADMIN — SUCRALFATE 1 G: 1 TABLET ORAL at 16:49

## 2024-01-10 RX ADMIN — LANSOPRAZOLE 30 MG: 15 TABLET, ORALLY DISINTEGRATING ORAL at 09:22

## 2024-01-10 RX ADMIN — CEFTRIAXONE SODIUM 1000 MG: 1 INJECTION, POWDER, FOR SOLUTION INTRAMUSCULAR; INTRAVENOUS at 16:49

## 2024-01-10 RX ADMIN — HYDROCODONE BITARTRATE AND ACETAMINOPHEN 1 TABLET: 7.5; 325 TABLET ORAL at 21:07

## 2024-01-10 RX ADMIN — ROSUVASTATIN CALCIUM 20 MG: 20 TABLET, FILM COATED ORAL at 21:07

## 2024-01-10 RX ADMIN — ANORECTAL OINTMENT 1 APPLICATION: 15.7; .44; 24; 20.6 OINTMENT TOPICAL at 21:08

## 2024-01-10 RX ADMIN — APIXABAN 2.5 MG: 2.5 TABLET, FILM COATED ORAL at 09:22

## 2024-01-10 RX ADMIN — Medication 10 ML: at 21:07

## 2024-01-10 RX ADMIN — AMLODIPINE BESYLATE 5 MG: 5 TABLET ORAL at 09:22

## 2024-01-10 RX ADMIN — APIXABAN 2.5 MG: 2.5 TABLET, FILM COATED ORAL at 21:07

## 2024-01-10 RX ADMIN — ANORECTAL OINTMENT 1 APPLICATION: 15.7; .44; 24; 20.6 OINTMENT TOPICAL at 17:47

## 2024-01-10 RX ADMIN — AMITRIPTYLINE HYDROCHLORIDE 50 MG: 50 TABLET, FILM COATED ORAL at 21:07

## 2024-01-10 RX ADMIN — HYDROCODONE BITARTRATE AND ACETAMINOPHEN 1 TABLET: 7.5; 325 TABLET ORAL at 16:49

## 2024-01-10 NOTE — TELEPHONE ENCOUNTER
Since pt is back in hospital and looks like going back to [place] vs home need can not be met at this time.  When pt does get home HE DOES HAVE TO BE SEEN    This is the following documentation that has to be in the OV note for insurance to pay;    Pt has a medical condition which requires positioning of the body in ways not feasible with ordinary beds and has need for frequent changes in body position

## 2024-01-10 NOTE — PLAN OF CARE
Goal Outcome Evaluation:  Plan of Care Reviewed With: patient           Outcome Evaluation: Pt vital signs are stable. Oriented x4. Ng feedings are now discontinued from 0800- 2000. Tube feedings are currently schedules for night time. Consent form was signed for MRI.

## 2024-01-10 NOTE — PROGRESS NOTES
"Nutrition Services    Patient Name:  Madhu Santoro  YOB: 1945  MRN: [id number]  Admit Date:  1/7/2024  Assessment Date:  01/10/24    CLINICAL NUTRITION    Comments: Follow up for tube feeding  TF's have been running continuous with Isosource 1.5 at 60ml/hr and water [id number] via gtube  Diet now advanced to soft to chew grd meat and NTL  Pt states he is full from TF's and unable to eat much.  Labs glu 138-236  Skin: coccyx stage 2  BM 1/8     Plan/Recommendations:   Will change TF's to nocturnal to encourage po intake.    RD to continue to monitor per protocol.     Encounter Information         Reason For Encounter Follow up for TF    Current Issues Weakness, dysphagia     Estimated Requirements         Calories  7175-9070 (30-35 kcal/kg)    Protein (gm)   (1.2 - 1.5 gm/kg)    Fluid (mL)   (1 mL/kcal)     Current Nutrition Orders & Evaluation of Intake       Oral Nutrition     Current PO Diet Diet: Regular/House Diet; No Mixed Consistencies; Texture: Soft to Chew (NDD 3); Soft to Chew: Ground Meat; Fluid Consistency: Nectar Thick   Supplement n/a   PO Evaluation     Trending % PO Intake     Factors Affecting Intake  chewing difficulty, swallow impairment, weakness      Enteral Nutrition     Enteral Route PEG    TF Delivery Method Continuous    Propofol Rate/Kcal     Current TF/Rate (mL) Isosource 1.5 @ 60 mL/hr    TF Goal Rate (mL) 60    Current Water Flush (mL) 30 Q 4 hr    Modular None    TF Residual  no or minimal residual    TF Tolerance tolerating    TF Observation Verified correct TF and water flush infusing per orders     Anthropometrics          Height    Weight Height: 172.7 cm (67.99\")  Weight: 75.3 kg (165 lb 14.6 oz) (01/07/24 1700)    BMI kg/m2 Body mass index is 25.23 kg/m².  Overweight (25 - 29.9)    Weight trend No new weight available     Labs        Pertinent Labs Reviewed, listed below     Results from last 7 days   Lab Units 01/10/24  0446 01/09/24  1538 01/09/24  0320 " 01/08/24 0700 01/07/24  0928   SODIUM mmol/L 140  --  139 139 139   POTASSIUM mmol/L 4.0 4.6 3.6 3.9 4.3   CHLORIDE mmol/L 106  --  106 105 101   CO2 mmol/L 22.4  --  22.2 22.7 27.0   BUN mg/dL 14  --  11 13 11   CREATININE mg/dL 0.57*  --  0.50* 0.59* 0.66*   CALCIUM mg/dL 8.7  --  8.6 8.8 9.7   BILIRUBIN mg/dL  --   --   --  0.3 0.3   ALK PHOS U/L  --   --   --  136* 153*   ALT (SGPT) U/L  --   --   --  17 17   AST (SGOT) U/L  --   --   --  30 39   GLUCOSE mg/dL 122*  --  96 123* 164*     Results from last 7 days   Lab Units 01/10/24  0446 01/09/24  0320 01/08/24  0700 01/08/24  0429 01/07/24  0928   MAGNESIUM mg/dL  --   --   --   --  2.0   HEMOGLOBIN g/dL 11.0*   < >  --    < > 12.7*   HEMATOCRIT % 34.7*   < >  --    < > 40.5   WBC 10*3/mm3 13.98*   < >  --    < > 13.52*   ALBUMIN g/dL  --   --  2.9*  --  3.8    < > = values in this interval not displayed.     Results from last 7 days   Lab Units 01/10/24  0446 01/09/24  0320 01/08/24  0429 01/07/24  0928   INR   --   --   --  1.34*   APTT seconds  --   --   --  42.5*   PLATELETS 10*3/mm3 441 491* 455* 498*     COVID19   Date Value Ref Range Status   01/07/2024 Not Detected Not Detected - Ref. Range Final     Lab Results   Component Value Date    HGBA1C 5.50 11/04/2023          Medications            Scheduled Medications allopurinol, 300 mg, Oral, Daily  amitriptyline, 50 mg, Per G Tube, Nightly  amLODIPine, 5 mg, Per G Tube, Daily  apixaban, 2.5 mg, Per G Tube, Q12H  [START ON 1/31/2024] cyanocobalamin, 1,000 mcg, Intramuscular, Q30 Days  HYDROcodone-acetaminophen, 1 tablet, Per G Tube, TID  lansoprazole, 30 mg, Per G Tube, BID AC  Menthol-Zinc Oxide, 1 application , Topical, Q12H  rosuvastatin, 20 mg, Per G Tube, Nightly  sodium chloride, 10 mL, Intravenous, Q12H  sucralfate, 1 g, Per G Tube, BID AC        Infusions      PRN Medications   acetaminophen **OR** acetaminophen **OR** acetaminophen    acetaminophen    Calcium Replacement - Follow Nurse / BPA  Driven Protocol    guaifenesin    ipratropium-albuterol    Magnesium Standard Dose Replacement - Follow Nurse / BPA Driven Protocol    nitroglycerin    ondansetron    Phosphorus Replacement - Follow Nurse / BPA Driven Protocol    Potassium Replacement - Follow Nurse / BPA Driven Protocol    sennosides-docusate    sodium chloride    sodium chloride     Physical Findings          Physical Appearance oriented   Oral/Mouth Cavity tooth or teeth missing   Edema  2+ (mild)   Gastrointestinal last bowel movement: 1/8   Skin  pressure injury: coccyx stage 2   Tubes/Drains PEG   NFPE Not indicated at this time     NUTRITION INTERVENTION / PLAN OF CARE  Intervention Goal         Intervention Goal(s) Maintain nutrition status, Reduce/improve symptoms, Meet estimated needs, Disease management/therapy, Establish PO intake, Tolerate TF/PN at goal, Transition TF to PO, Maintain weight, and PO intake goal %: 75+     Nutrition Intervention         RD Action Adjust EN/PN regimen, Encourage intake, Continue to monitor, Care plan reviewed, and Recommend/order: TF's     Prescription         Diet Prescription     Supplement Prescription    EN/PN Prescription    New Prescription Ordered? Yes   --  Prescription/Orders:        PO Diet        Supplements        Enteral Nutrition        Parenteral Nutrition     New Prescription Ordered? Yes   --   Enteral Prescription:      Enteral Route PEG    TF Delivery Method Cycle Nocturnal 8pm-8am    Enteral Product Isosource 1.5    Modular None    Propofol Rate/Kcal      TF Start Rate  20 mL/hr    TF Goal Rate  60 mL/hr    Free Water Flush 30 mL Q 4 hr    Provision at Goal:           Calories 1080 kcal, meets 47% needs         Protein  48 gm protein, meets 54% needs         Fluid (mL) 547 mL free water + 180 mL in flushes     Monitor/Evaluation        Monitor Per protocol   Discharge Needs Pending clinical course       Electronically signed by:  Meera Reis RD  01/10/24 10:45 EST

## 2024-01-10 NOTE — PLAN OF CARE
Problem: Adult Inpatient Plan of Care  Goal: Plan of Care Review  Outcome: Ongoing, Progressing  Flowsheets (Taken 1/10/2024 0511)  Plan of Care Reviewed With: patient  Outcome Evaluation: WCTM, tolerating tube feedings, no residuals, A/Ox4, remains on room air, continues to be lethragic and his extremties remain weak. Possible [place] today [place] [facility] [place] with family.   Goal Outcome Evaluation:  Plan of Care Reviewed With: patient           Outcome Evaluation: WCTM, tolerating tube feedings, no residuals, A/Ox4, remains on room air, continues to be lethragic and his extremties remain weak. Possible discharge today [place] [facility] [place] with family.

## 2024-01-10 NOTE — PROGRESS NOTES
"    Name: Madhu Santoro ADMIT: 2024   : 1945  PCP: Damian Sanchez MD    MRN: [id number] LOS: 1 days   AGE/SEX: 78 y.o. male  ROOM: [facility]/     Subjective   Subjective   Resting in bed.  Just got cleaned up for a bowel movement from the aide.  I was stopped by the patient's daughter in the hallway as she was leaving.  She has multiple concerns/questions stating that he declined quickly once he returned home within the matter of a day with recurrent falls and weakness.  She states that he has chronic back issues and has refused back injections in the past but is concerned something is going on with his lower spine that could be causing his weakness.  She states that he has been seen by hematology in the past and is requesting that they evaluate him here given his history of polycythemia vera.  She is also requesting that the patient stay in the hospital until Monday as they do not feel that he gets good care at the rehab facilities over the weekend.  Nursing notes reviewed overnight indicates that the patient was given thin chocolate milk by family and educated on the risks of doing this.    Patient currently denies any chest pain, dyspnea, cough, fever or chills.  Denies any upper respiratory symptoms as well.  Apparently his wife who visited the other day is at home with the flu.  He denies any nausea, vomiting or abdominal pain.  Continues to state that both his legs are weak and heavy.  He states that \"I feel like my legs are paralyzed.\"  He does have known baseline neuropathy.  Today, he does report a new onset of dysuria with urination as well.     Objective   Objective   Vital Signs  Temp:  [98.1 °F (36.7 °C)-98.4 °F (36.9 °C)] 98.4 °F (36.9 °C)  Heart Rate:  [82-95] 86  Resp:  [18] 18  BP: (149-166)/(68-89) 149/89  SpO2:  [96 %-100 %] 96 %  on   ;   Device (Oxygen Therapy): room air  Body mass index is 25.23 kg/m².    Physical Exam  Vitals and nursing note reviewed.   Constitutional:       " Appearance: He is ill-appearing. He is not toxic-appearing.   Cardiovascular:      Rate and Rhythm: Normal rate and regular rhythm.      Pulses: Normal pulses.   Pulmonary:      Effort: Pulmonary effort is normal. No respiratory distress.   Abdominal:      General: Bowel sounds are normal. There is no distension.      Palpations: Abdomen is soft.      Tenderness: There is no abdominal tenderness.      Comments: PEG present   Musculoskeletal:         General: Swelling (Mild bilateral lower extremities) present. Normal range of motion.      Cervical back: Normal range of motion and neck supple.   Skin:     General: Skin is warm and dry.      Findings: No bruising.   Neurological:      General: No focal deficit present.      Mental Status: He is alert and oriented to person, place, and time.      Sensory: No sensory deficit.      Motor: Weakness of both lower extremities present.      Coordination: Coordination normal.   Psychiatric:         Mood and Affect: Mood normal.         Behavior: Behavior normal.     Results Review:       I reviewed the patient's new clinical results.  Results from last 7 days   Lab Units 01/10/24  0446 01/09/24  0320 01/08/24  0429 01/07/24  0928   WBC 10*3/mm3 13.98* 13.18* 10.15 13.52*   HEMOGLOBIN g/dL 11.0* 11.3* 10.4* 12.7*   PLATELETS 10*3/mm3 441 491* 455* 498*     Results from last 7 days   Lab Units 01/10/24  0446 01/09/24  1538 01/09/24  0320 01/08/24  0700 01/07/24  0928   SODIUM mmol/L 140  --  139 139 139   POTASSIUM mmol/L 4.0 4.6 3.6 3.9 4.3   CHLORIDE mmol/L 106  --  106 105 101   CO2 mmol/L 22.4  --  22.2 22.7 27.0   BUN mg/dL 14  --  11 13 11   CREATININE mg/dL 0.57*  --  0.50* 0.59* 0.66*   GLUCOSE mg/dL 122*  --  96 123* 164*   Estimated Creatinine Clearance: 111.9 mL/min (A) (by C-G formula based on SCr of 0.57 mg/dL (L)).  Results from last 7 days   Lab Units 01/08/24  0700 01/07/24  0928   ALBUMIN g/dL 2.9* 3.8   BILIRUBIN mg/dL 0.3 0.3   ALK PHOS U/L 136* 153*   AST  (SGOT) U/L 30 39   ALT (SGPT) U/L 17 17     Results from last 7 days   Lab Units 01/10/24  0446 01/09/24  0320 01/08/24  0700 01/07/24  0928   CALCIUM mg/dL 8.7 8.6 8.8 9.7   ALBUMIN g/dL  --   --  2.9* 3.8   MAGNESIUM mg/dL  --   --   --  2.0     Results from last 7 days   Lab Units 01/10/24  0446 01/07/24  1227 01/07/24  0928   PROCALCITONIN ng/mL 0.13  --  0.13   LACTATE mmol/L  --  0.9  --      Glucose   Date/Time Value Ref Range Status   01/10/2024 0552 138 (H) 70 - 130 mg/dL Final   01/09/2024 2325 236 (H) 70 - 130 mg/dL Final   01/09/2024 1622 116 70 - 130 mg/dL Final   01/09/2024 1101 118 70 - 130 mg/dL Final   01/09/2024 0532 139 (H) 70 - 130 mg/dL Final   01/08/2024 2332 139 (H) 70 - 130 mg/dL Final   01/08/2024 1613 107 70 - 130 mg/dL Final       allopurinol, 300 mg, Oral, Daily  amitriptyline, 50 mg, Per G Tube, Nightly  amLODIPine, 5 mg, Per G Tube, Daily  apixaban, 2.5 mg, Per G Tube, Q12H  [START ON 1/31/2024] cyanocobalamin, 1,000 mcg, Intramuscular, Q30 Days  HYDROcodone-acetaminophen, 1 tablet, Per G Tube, TID  lansoprazole, 30 mg, Per G Tube, BID AC  Menthol-Zinc Oxide, 1 application , Topical, Q12H  rosuvastatin, 20 mg, Per G Tube, Nightly  sodium chloride, 10 mL, Intravenous, Q12H  sucralfate, 1 g, Per G Tube, BID AC       Diet: Regular/House Diet; No Mixed Consistencies; Texture: Soft to Chew (NDD 3); Soft to Chew: Ground Meat; Fluid Consistency: Nectar Thick       Assessment/Plan     Active Hospital Problems    Diagnosis  POA    **Orthostatic dizziness [R42]  Yes    Orthostatic hypotension [I95.1]  Yes    Bilateral leg weakness [R29.898]  Yes    Oral phase dysphagia [R13.11]  Yes    History of CVA (cerebrovascular accident) [Z86.73]  Not Applicable    S/P percutaneous endoscopic gastrostomy (PEG) tube placement [Z93.1]  Not Applicable    Polycythemia vera [D45]  Yes    Iron deficiency anemia [D50.9]  Yes    Chronic anticoagulation [Z79.01]  Not Applicable    S/P ablation of atrial flutter  [Z98.890, Z86.79]  Not Applicable    Typical atrial flutter [I48.3]  Yes    Gastroesophageal reflux disease [K21.9]  Yes    DDD (degenerative disc disease), lumbosacral [M51.37]  Yes    Benign essential hypertension [I10]  Yes    Coronary artery disease due to lipid rich plaque [I25.10, I25.83]  Yes      Resolved Hospital Problems   No resolved problems to display.     Mr. Santoro is a 78 y.o. male that presented to the hospital for weakness and falls.  He was recently admitted to this facility at the end of November when he was found to have weakness and worsening anemia.  He was found to have a GI bleed most likely related to recent PEG tube placement and related anticoagulation with Eliquis.  He was maintained on a lower dose of Eliquis at that point as family did not want to pursue endoscopy.  He was noted to have a decubitus ulcer as well as dysphagia.  He was maintained on bolus feeds and discharged to SNF for strengthening.  He was to follow-up with Dr. Daly with hematology given his history of polycythemia.  He was apparently discharged from SNF 1/5 and returned home with family.  He continued to be weak and having issues ambulating with a walker.  His legs gave out on him and he suffered a mechanical fall hitting the left side of his head.  He was noted to have some orthostatic hypotension and admitted for further evaluation.     Orthostatic dizziness  Mechanical fall  -Given IV hydration with improvement. IV stopped.  -Sounds like he has some chronicity to dizziness with position changes.  Recommend safety precautions.  -May benefit from compression hose.  -CT head stable from prior with no acute findings or traumatic etiology.  -PT/OT following-looks like SNF recommended. CCP evaluating.     Dysphagia  PEG tube in place  History of CVA  -Chest x-ray on admission negative for acute findings.  -Neurologically intact.  Will monitor.  -Continue low-dose Eliquis and statin.  Aspirin stopped last admission due  "to anemia and possible PEG bleeding.  -Continue tube feedings from prior- rehab facility requesting adjustment of feeds to Osmolite 1.5-> will discuss with dietician as I believe he has had some intolerance to TF in the past.  -Aspiration precautions  -VFSS 1/9 per ST with recommendations of soft to chew/NTL. Meds whole or crushed with NTL, upright for PO and for 30-45 minutes post meal, alternate liquid/solids.  -Patient apparently received in liquids overnight-I again discussed this with the patient as well as the patient's daughter of the need to comply with nectar thick liquids.  Daughter states that he was cleared for thins after his recent rehab stay.  I explained that he has gotten weaker since that time and this includes his swallow ability.  VFSS confirms presence of aspiration with thins.  Family and patient understand.     Iron-deficiency anemia  Polycythemia vera  Thrombocytosis  -With recent GI bleeding.  Continue PPI Carafate.  -Hemoglobin much improved from prior.  Will trend.  -Continue outpatient follow-up with Dr. Daly with hematology.  Family requesting inpatient evaluation we will consult them to see while admitted.  -Platelets mildly elevated this admission. ? Reactive-> normalized 1/10     CAD  Hypertension  History of atrial fibrillation with ablation  -Followed by [facility] in the past.  -Continue low-dose Eliquis and statin.  Continue Norvasc.  -Monitor for any cardiac complaints.  -Monitor on telemetry     Bilateral lower extremity weakness  -X-ray lumbar spine with mild retrolisthesis of L1 on L2, L2 and L3.  Vertebral body heights stable with no acute fractures.  Does have multilevel DDD.  -Overall, patient reports leg heaviness bilaterally. Denies any paraesthesias, radicular pain or unilateral symptoms.  He denies any new or worsening back pain in the setting of chronic lumbar degenerative disease.  No pain with leg raises. Now reporting sensation of \"legs paralyzed\"->will " obtain MRI lumbar spine to rule out any acute etiologies. Expect this will be low yield however.   -Suspect this is primarily muscular weakness/deconditioning. TSH/folate/vitamin B12 okay.  -Venous dopplers negative.    Leukocytosis  -Persistent today. Repeat CXR negative. No URI symptoms.   -Procal normal 1/10.   -Does have some dysuria-> obtain UA.      Discussed with patient, daughter, CCP and Dr. Chan.    Await hematology evaluation as well as MRI lumbar spine.  Will obtain UA and repeat labs in the a.m.  Explained to the daughter that he cannot stay in the hospital until Monday unless there is a medical reason for this.  If medically cleared prior to then, would be stable for transfer to rehab.     VTE Prophylaxis - Eliquis (home med)  Code Status - NO CPR/limited per admitting provider.  Disposition - Anticipate discharge next 1-2 days. Could be medically ready as of tomorrow pending WBC    MY Mondragon  [facility]  01/10/24  10:46 EST

## 2024-01-10 NOTE — TELEPHONE ENCOUNTER
Provider: DR CHAPPELL    Caller: BAYRON    Relationship to Patient: [facility]ITH[facility]        Phone Number: [phone]    Reason for Call: BAYRON WITH [facility] HAS QUESTIONS REGARDING A HOSPITAL BED FOR PATIENT.  SHE WOULD LIKE A CALL BACK TO CLARIFY.    PLEASE ADVISE.

## 2024-01-10 NOTE — TELEPHONE ENCOUNTER
[facility] Psychiatry consult paged.      Lio Ricardo  07/05/23 0818 LDTVM advising of return call to give clarification.

## 2024-01-10 NOTE — CONSULTS
Subjective     REASON FOR CONSULTATION: Patient followed in our practice with P vera  Provide an opinion on any further workup or treatment                             REQUESTING PHYSICIAN: A    RECORDS OBTAINED:  Records of the patients history including those obtained from the referring provider were reviewed and summarized in detail.    HISTORY OF PRESENT ILLNESS:  The patient is a 78 y.o. year old male who is here for an opinion about the above issue.    History of Present Illness patient is a 78-year-old male with polycythemia vera who has had a very nuha course since a bout with COVID late last year.  He has been in and out of rehabs and is readmitted at this time with weakness and difficulty walking at home after discharge from the nursing home on Friday    He has had evidence of hemolysis when he was last here in December and his hydroxyurea has been held but his hemoglobin is improved.  His reticulocyte count is down to 3% which is also an improvement.  He is still not on Hydrea with decent blood counts  He denies any bleeding    We were consulted at the family's request and we  Have nothing to add at this time    Past Medical History:   Diagnosis Date    Anxiety     Arthritis     Atrial flutter     Status post cavotricuspid isthmus ablation by Dr. Gustafson on 4/18/17    Mandel esophagus     Benign essential hypertension     CAD (coronary artery disease)     3 vessel CABG 4/11/17 by Dr. Cortez: ROSARIO-prox LAD, SVG-OM1, SVG-OM3    Carotid artery disease     Status post carotid endarterectomy - [facility] 4/10/17: 50-59% NICHELLE, 1-15% LICA.     Colonic polyp     Cyst of pancreas     DDD (degenerative disc disease), lumbosacral     GERD (gastroesophageal reflux disease)     H/O bone density study 2013    H/O complete eye exam 2014    History of kidney stone     HLD (hyperlipidemia)     Hypertension     Kidney stone     8/22/22    Lipid screening 05/31/2013    Low back pain     [facility]    Screening for prostate cancer 07/07/2015    Skin cancer     nose    Stroke     RESIDUAL--\"BALANCE ISSUES\"        Past Surgical History:   Procedure Laterality Date    CARDIAC CATHETERIZATION N/A 04/10/2017    Procedure: Left Heart Cath;  Surgeon: Marjorie Healy MD;  Location: [facility] CATH INVASIVE LOCATION;  Service:     CARDIAC CATHETERIZATION N/A 04/10/2017    Procedure: Coronary angiography;  Surgeon: Marjorie Healy MD;  Location: [facility] CATH INVASIVE LOCATION;  Service:     CARDIAC CATHETERIZATION N/A 04/10/2017    Procedure: Left ventriculography;  Surgeon: Marjorie Healy MD;  Location: [facility] CATH INVASIVE LOCATION;  Service:     CARDIAC CATHETERIZATION  2011    CARDIAC ELECTROPHYSIOLOGY PROCEDURE N/A 04/18/2017    Procedure: Ablation atrial flutter;  Surgeon: Jose Antonio Gustafson MD;  Location: [facility] CATH INVASIVE LOCATION;  Service:     CAROTID ENDARTERECTOMY      CHOLECYSTECTOMY      CHOLECYSTECTOMY WITH INTRAOPERATIVE CHOLANGIOGRAM N/A 09/07/2019    Procedure: Laparoscopic cholecystectomy with intraoperative cholangiogram;  Surgeon: Gauri Travis MD;  Location: [facility] MAIN OR;  Service: General    COLONOSCOPY  01/06/2015    Diverticulosis, one TA    COLONOSCOPY N/A 02/14/2019    tics, NBIH, adenomatous polyp x 2    COLONOSCOPY N/A 11/05/2021    Procedure: COLONOSCOPY TO CECUM AND TERM. ILEUM WITH COLD POLYPECTOMIES;  Surgeon: Everton Abel MD;  Location: [facility] ENDOSCOPY;  Service: Gastroenterology;  Laterality: N/A;  PRE OP - PERS H/O POLYPS  POST OP - COLON POLYPS,, DIVERTICULOSIS, HEMORRHOIDS    CORONARY ARTERY BYPASS GRAFT N/A 04/11/2017    Procedure: AR STERNOTOMY CORONARY ARTERY BYPASS GRAFT TIMES 3 USING LEFT INTERNAL MAMMARY ARTERY AND LEFT GREATER SAPHENOUS VEIN GRAFT PER ENDOSCOPIC VEIN HARVESTING AND PRP ;  Surgeon: Temo Cortez MD;  Location: [facility] MAIN OR;  Service:     ENDOSCOPY  01/06/2015    HH, Mandel's esophagus    ENDOSCOPY N/A 02/14/2019    Z line irregular, " HH, Ervin's esophagus    ENDOSCOPY N/A 11/05/2021    Procedure: ESOPHAGOGASTRODUODENOSCOPY WITH BIOPSIES;  Surgeon: Everton Abel MD;  [facility];  Service: Gastroenterology;  Laterality: N/A;  PRE OP - PERS H/O ERVIN'S  POST OP - IRREG Z LINE    ENDOSCOPY W/ PEG TUBE PLACEMENT N/A 11/6/2023    Procedure: ESOPHAGOGASTRODUODENOSCOPY WITH PERCUTANEOUS ENDOSCOPIC GASTROSTOMY TUBE INSERTION;  Surgeon: Temo Ramsey MD;  [facility];  Service: General;  Laterality: N/A;  Pre: dysphagia  Post: same    KNEE SURGERY Left     URETEROSCOPY LASER LITHOTRIPSY WITH STENT INSERTION Left 8/23/2022    Procedure: Cysto retrograde with left uretro stent placement;  Surgeon: Damien Oliveira MD;  [facility] MAIN OR;  Service: Urology;  Laterality: Left;    VASECTOMY          No current facility-administered medications on file prior to encounter.     Current Outpatient Medications on File Prior to Encounter   Medication Sig Dispense Refill    allopurinol (ZYLOPRIM) 300 MG tablet Take 1 tablet by mouth Daily.      amitriptyline (ELAVIL) 50 MG tablet Administer 1 tablet per G tube Every Night.      amLODIPine (NORVASC) 5 MG tablet Administer 1 tablet per G tube Daily.      apixaban (ELIQUIS) 2.5 MG tablet tablet Take 1 tablet by mouth Every 12 (Twelve) Hours. Indications: Atrial Fibrillation      HYDROcodone-acetaminophen (Norco) 7.5-325 MG per tablet Take 1 tablet by mouth 3 (Three) Times a Day. Hold if sedation (Patient taking differently: Take 1 tablet by mouth 4 (Four) Times a Day As Needed for Moderate Pain or Severe Pain. Hold if sedation) 12 tablet 0    lansoprazole (PREVACID SOLUTAB) 30 MG Tablet Delayed Release Dispersible disintegrating tablet Administer 1 tablet per G tube 2 (Two) Times a Day Before Meals.      rosuvastatin (Crestor) 20 MG tablet Administer 1 tablet per G tube Every Night.      sucralfate (CARAFATE) 1 g tablet Administer 1 tablet per G tube 2 (Two)  Times a Day Before Meals.      acetaminophen (TYLENOL) 325 MG tablet Administer 2 tablets per G tube Every 6 (Six) Hours As Needed for Mild Pain.      guaifenesin (ROBITUSSIN) 100 MG/5ML liquid Take 10 mL by mouth Every 4 (Four) Hours As Needed for Cough.      ipratropium-albuterol (DUO-NEB) 0.5-2.5 mg/3 ml nebulizer Take 3 mL by nebulization Every 4 (Four) Hours As Needed for Shortness of Air.      Menthol-Zinc Oxide 0.44-20.6 % ointment Apply 1 application  topically to the appropriate area as directed Every 12 (Twelve) Hours.      sennosides-docusate (PERICOLACE) 8.6-50 MG per tablet Administer 2 tablets per G tube 2 (Two) Times a Day As Needed for Constipation.          ALLERGIES:    Allergies   Allergen Reactions    Atorvastatin Myalgia     Myalgia    Penicillins Hives, Swelling and Rash     Tolerates cephalosporins         Social History     Socioeconomic History    Marital status:      Spouse name: Brooke    Years of education: 9th grade   Tobacco Use    Smoking status: Former     Packs/day: 1     Types: Cigarettes     Start date: 1/1/1959     Quit date: 1/1/2009     Years since quitting: 15.0    Smokeless tobacco: Never    Tobacco comments:     CAFFEINE USE   Vaping Use    Vaping Use: Never used   Substance and Sexual Activity    Alcohol use: Not Currently     Comment: rarely    Drug use: No    Sexual activity: Defer     Partners: Female        Family History   Problem Relation Age of Onset    Hypertension Mother     Heart disease Mother     Heart attack Mother     Stroke Mother     Heart disease Father     Heart attack Father     Stroke Father     Hypertension Sister     Heart attack Brother     Heart disease Brother     No Known Problems Brother     Heart disease Brother     Heart attack Brother     Diabetes Brother     No Known Problems Maternal Grandmother     No Known Problems Maternal Grandfather     No Known Problems Paternal Grandmother     No Known Problems Paternal Grandfather     Pancreatic  cancer Paternal Cousin     Arthritis Other     Diabetes Other     Hypertension Other     Kidney disease Other         stones    Malig Hyperthermia Neg Hx         Review of Systems   Constitutional:  Positive for fatigue.   Musculoskeletal:  Positive for gait problem.   Neurological:  Positive for weakness.        Objective     Vitals:    01/09/24 1804 01/09/24 2213 01/10/24 0618 01/10/24 1344   BP: 155/68 156/70 149/89 145/67   BP Location: Right arm Right arm Right arm Right arm   Patient Position: Lying Lying Lying Lying   Pulse: 95 95 86 79   Resp: 18 18 18 18   Temp: 98.3 °F (36.8 °C) 98.2 °F (36.8 °C) 98.4 °F (36.9 °C)    TempSrc: Oral Oral Oral    SpO2: 98% 99% 96% 94%   Weight:       Height:             10/20/2023     1:51 PM   Current Status   ECOG score 0       Physical Exam        CONSTITUTIONAL:  Vital signs reviewed.  No distress, looks comfortable.  EYES:  Conjunctivae and lids unremarkable.  PERRLA  EARS,NOSE,MOUTH,THROAT:  Ears and nose appear unremarkable.  Lips, teeth, gums appear unremarkable.  RESPIRATORY:  Normal respiratory effort.  Lungs clear to auscultation bilaterally.  CARDIOVASCULAR:  Normal S1, S2.  No murmurs rubs or gallops.  No significant lower extremity edema.  GASTROINTESTINAL: Abdomen appears unremarkable.  Nontender.  No hepatomegaly.  No splenomegaly.  G-tube in place  LYMPHATIC:  No cervical, supraclavicular, axillary lymphadenopathy.  SKIN:  Warm.  No rashes.  PSYCHIATRIC:  Normal judgment and insight.  Normal mood and affect.      RECENT LABS:  Hematology WBC   Date Value Ref Range Status   01/10/2024 13.98 (H) 3.40 - 10.80 10*3/mm3 Final     RBC   Date Value Ref Range Status   01/10/2024 3.76 (L) 4.14 - 5.80 10*6/mm3 Final     Hemoglobin   Date Value Ref Range Status   01/10/2024 11.0 (L) 13.0 - 17.7 g/dL Final     Hematocrit   Date Value Ref Range Status   01/10/2024 34.7 (L) 37.5 - 51.0 % Final     Platelets   Date Value Ref Range Status   01/10/2024 441 140 - 450 10*3/mm3  Final          Assessment & Plan     *Hospitalized in mid October to early November with COVID-pneumonia with profound deconditioning discharged to nursing home  Readmitted 11/22/2023 with anemia-patient has not been on hydroxyurea at discharge from the hospital and remains on Eliquis  MCV is dropped significantly suggesting iron depletion and blood loss (but also may be related to being off Hydrea)  Patient himself denies hematochezia or melena  B12 folate normal ferritin 566 iron saturation 12% reticulocyte count 9% rule out hemolysis although acute blood loss can also cause an elevated reticulocyte  LDH elevated 356 haptoglobin low at 24-suggest hemolysis suggest hemolysis   Crossmatch compatible suggesting indirect claudia neg  Direct claudia Complement +?  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  These indices have been slowly reducing over the last 2 years approximately followed more closely.  Concurrently is a mild drop in his baseline hemoglobin still well within normal limits.  thrombocytosis thought, initially, to be related to iron deficiency.  Ferritin found to be 16.3 and iron of 26 with 5% saturation and TIBC 511.   The patient was placed on ferrous gluconate which, unfortunately, he was unable to tolerate with worsening constipation.  He had further issues in  early September with left renal stone, requiring cystoscopy, stent placement 8/23/2022.  9/28/2022 with repeat studies performed 9/21 demonstrating 5% iron saturation, ferritin of 12.5.  He remains further weight and fatigue, again oral iron intolerant and will require IV iron preparations for his iron deficiency anemia.  We discontinued oral iron and proceeded with Injectafer given 9/28/2022 in 10/5/2022  4/17/2023JAK  2-V617 F mutation having been detected.    In the interval he was admitted 2/19-21/23 for weakness, fall and ER evaluation not demonstrate any acute intracranial process, CT of lumbar spine with lumbar degenerative disease and other studies not show any acute abnormalities.  Fortunately he went on to improve though his wife had a positive COVID test recently on home examination.  4/10/2023 include an H&H of 18.3 and 60.9 white count of 14,700 platelet count of 477,000.  5/15/2023 hemoglobin 15.3, hematocrit 50.4.  Reviewed with Dr. Lopez plans to hold off on phlebotomy and initiate Hydrea 1000 mg daily.  We will tentatively schedule him for return follow-up visit in 2 weeks with repeat labs and reassessment.  6/1/2020 2:23 weeks of Hydrea 1000 mg daily, WBC 5.0, hemoglobin 15.2, hematocrit 49.8, platelets 113,000.  Hydrea was reduced to 500 mg daily  Stability of counts today, 6/15/2023 on 500 mg daily with WBC 4.81, hemoglobin 14.6, hematocrit 46.7%, platelets 156,000  Patient seen 6/30/2023 with H&H of 14.1 and 46.6 white count of 4800 and platelet count of 1 46,000.  Patient subsequently assessed including 9/21/2023 with H&H gradually dropping 11.1 and 32.9, white count of 5110, platelet count 151,000, .8.  10/20/2023 WBC 8.04, SNC 5.42, Hgb 13.1, Platelets 247,000.  Continue Hydrea 500 mg every other day.  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

## 2024-01-11 ENCOUNTER — APPOINTMENT (OUTPATIENT)
Dept: MRI IMAGING | Facility: HOSPITAL | Age: 79
DRG: 312 | End: 2024-01-11
Payer: MEDICARE

## 2024-01-11 LAB
ANION GAP SERPL CALCULATED.3IONS-SCNC: 9 MMOL/L (ref 5–15)
BUN SERPL-MCNC: 14 MG/DL (ref 8–23)
BUN/CREAT SERPL: 26.4 (ref 7–25)
CALCIUM SPEC-SCNC: 8.9 MG/DL (ref 8.6–10.5)
CHLORIDE SERPL-SCNC: 103 MMOL/L (ref 98–107)
CO2 SERPL-SCNC: 25 MMOL/L (ref 22–29)
CREAT SERPL-MCNC: 0.53 MG/DL (ref 0.76–1.27)
DEPRECATED RDW RBC AUTO: 53.5 FL (ref 37–54)
EGFRCR SERPLBLD CKD-EPI 2021: 101.9 ML/MIN/1.73
ERYTHROCYTE [DISTWIDTH] IN BLOOD BY AUTOMATED COUNT: 15.8 % (ref 12.3–15.4)
GLUCOSE BLDC GLUCOMTR-MCNC: 102 MG/DL (ref 70–130)
GLUCOSE BLDC GLUCOMTR-MCNC: 104 MG/DL (ref 70–130)
GLUCOSE BLDC GLUCOMTR-MCNC: 112 MG/DL (ref 70–130)
GLUCOSE BLDC GLUCOMTR-MCNC: 99 MG/DL (ref 70–130)
GLUCOSE SERPL-MCNC: 119 MG/DL (ref 65–99)
HCT VFR BLD AUTO: 35.2 % (ref 37.5–51)
HGB BLD-MCNC: 11.1 G/DL (ref 13–17.7)
MCH RBC QN AUTO: 29.3 PG (ref 26.6–33)
MCHC RBC AUTO-ENTMCNC: 31.5 G/DL (ref 31.5–35.7)
MCV RBC AUTO: 92.9 FL (ref 79–97)
PLATELET # BLD AUTO: 439 10*3/MM3 (ref 140–450)
PMV BLD AUTO: 10.4 FL (ref 6–12)
POTASSIUM SERPL-SCNC: 4.4 MMOL/L (ref 3.5–5.2)
RBC # BLD AUTO: 3.79 10*6/MM3 (ref 4.14–5.8)
SODIUM SERPL-SCNC: 137 MMOL/L (ref 136–145)
WBC NRBC COR # BLD AUTO: 10.96 10*3/MM3 (ref 3.4–10.8)

## 2024-01-11 PROCEDURE — 85027 COMPLETE CBC AUTOMATED: CPT | Performed by: NURSE PRACTITIONER

## 2024-01-11 PROCEDURE — 99222 1ST HOSP IP/OBS MODERATE 55: CPT | Performed by: NURSE PRACTITIONER

## 2024-01-11 PROCEDURE — 72141 MRI NECK SPINE W/O DYE: CPT

## 2024-01-11 PROCEDURE — 97110 THERAPEUTIC EXERCISES: CPT

## 2024-01-11 PROCEDURE — 25010000002 CEFTRIAXONE PER 250 MG: Performed by: NURSE PRACTITIONER

## 2024-01-11 PROCEDURE — 82948 REAGENT STRIP/BLOOD GLUCOSE: CPT

## 2024-01-11 PROCEDURE — 97535 SELF CARE MNGMENT TRAINING: CPT

## 2024-01-11 PROCEDURE — 72146 MRI CHEST SPINE W/O DYE: CPT

## 2024-01-11 PROCEDURE — 80048 BASIC METABOLIC PNL TOTAL CA: CPT | Performed by: NURSE PRACTITIONER

## 2024-01-11 PROCEDURE — 97530 THERAPEUTIC ACTIVITIES: CPT

## 2024-01-11 RX ADMIN — HYDROCODONE BITARTRATE AND ACETAMINOPHEN 1 TABLET: 7.5; 325 TABLET ORAL at 18:59

## 2024-01-11 RX ADMIN — HYDROCODONE BITARTRATE AND ACETAMINOPHEN 1 TABLET: 7.5; 325 TABLET ORAL at 22:59

## 2024-01-11 RX ADMIN — ANORECTAL OINTMENT 1 APPLICATION: 15.7; .44; 24; 20.6 OINTMENT TOPICAL at 23:01

## 2024-01-11 RX ADMIN — APIXABAN 2.5 MG: 2.5 TABLET, FILM COATED ORAL at 08:03

## 2024-01-11 RX ADMIN — Medication 10 ML: at 23:00

## 2024-01-11 RX ADMIN — SUCRALFATE 1 G: 1 TABLET ORAL at 08:03

## 2024-01-11 RX ADMIN — APIXABAN 2.5 MG: 2.5 TABLET, FILM COATED ORAL at 22:58

## 2024-01-11 RX ADMIN — ROSUVASTATIN CALCIUM 20 MG: 20 TABLET, FILM COATED ORAL at 22:59

## 2024-01-11 RX ADMIN — AMITRIPTYLINE HYDROCHLORIDE 50 MG: 50 TABLET, FILM COATED ORAL at 22:59

## 2024-01-11 RX ADMIN — LANSOPRAZOLE 30 MG: 15 TABLET, ORALLY DISINTEGRATING ORAL at 08:03

## 2024-01-11 RX ADMIN — HYDROCODONE BITARTRATE AND ACETAMINOPHEN 1 TABLET: 7.5; 325 TABLET ORAL at 08:03

## 2024-01-11 RX ADMIN — ALLOPURINOL 300 MG: 300 TABLET ORAL at 08:03

## 2024-01-11 RX ADMIN — LANSOPRAZOLE 30 MG: 15 TABLET, ORALLY DISINTEGRATING ORAL at 19:00

## 2024-01-11 RX ADMIN — SUCRALFATE 1 G: 1 TABLET ORAL at 18:59

## 2024-01-11 RX ADMIN — ANORECTAL OINTMENT 1 APPLICATION: 15.7; .44; 24; 20.6 OINTMENT TOPICAL at 12:45

## 2024-01-11 RX ADMIN — CEFTRIAXONE SODIUM 1000 MG: 1 INJECTION, POWDER, FOR SOLUTION INTRAMUSCULAR; INTRAVENOUS at 18:58

## 2024-01-11 RX ADMIN — AMLODIPINE BESYLATE 5 MG: 5 TABLET ORAL at 08:03

## 2024-01-11 NOTE — CASE MANAGEMENT/SOCIAL WORK
Continued Stay Note  [facility]     Patient Name: Madhu Santoro  MRN: [id number]  Today's Date: 1/11/2024    Admit Date: 1/7/2024    Plan: [facility] via [facility] at 10am Friday   Discharge Plan       Row Name 01/11/24 1705       Plan    Plan [facility] via [facility] at 10am Friday    Patient/Family in Agreement with Plan yes  SPOKE WITH WIFE    Plan Comments Spoke with April PEPE, plan for dc tomorrow pending clearance from [facility]. [facility] arranged [facility] for 10am friday. Spoke with [facility]/ [facility] on dc time. She requested to see if pt could be on Osmolite 1.5 at facility. [facility] spoke with Meera in Nutrition and she stated pt  can go on the osmolite 1.5 at [facility] as it is the equivalent to [employer] 1.5.   [facility] at 10am. Packet in koko. Danelle RNCCP                   Discharge Codes    No documentation.                 Expected Discharge Date and Time       Expected Discharge Date Expected Discharge Time    Jan 12, 2024 10:00 AM              Monica Russell RN

## 2024-01-11 NOTE — THERAPY TREATMENT NOTE
Patient Name: Madhu Santoro  : 1945    MRN: [id number]                              Today's Date: 2024       Admit Date: 2024    Visit Dx:     ICD-10-CM ICD-9-CM   1. Generalized weakness  R53.1 780.79   2. Unable to ambulate  R26.2 719.7   3. Bandemia  D72.825 288.66   4. Orthostatic hypotension  I95.1 458.0   5. Neuropathy  G62.9 355.9     Patient Active Problem List   Diagnosis    Anxiety    Arthritis    Coronary artery disease due to lipid rich plaque    HLD (hyperlipidemia)    Benign essential hypertension    DDD (degenerative disc disease), lumbosacral    Cerebrovascular accident    Encounter for screening for cardiovascular disorders    Gastroesophageal reflux disease    Hyperlipidemia    Stenosis of carotid artery    Former smoker    History of cardiac catheterization    S/P ablation of atrial flutter    Typical atrial flutter    S/P CABG (coronary artery bypass graft)    Adenomatous polyp of ascending colon    Abdominal bloating    Stroke    PAD (peripheral artery disease)    Acute cholecystitis    Right upper quadrant abdominal pain    Chronic pain of both hips    Chronic bilateral low back pain without sciatica    Sacroiliac joint dysfunction of both sides    Encounter for long-term (current) use of high-risk medication    Lumbar facet arthropathy    Stroke-like symptoms    CVA (cerebral vascular accident)    Personal history of colonic polyps    Arthralgia of multiple joints    Iron deficiency    Thrombocytosis    Left ureteral stone    Obstructive uropathy    Chronic anticoagulation    Facial skin lesion    Iron deficiency anemia    Polycythemia    Neuropathy    Weakness    Pneumonia    Close exposure to COVID-19 virus    Polycythemia vera    Chronic gout without tophus    COVID-19    Cytokine release syndrome, grade 2    Anemia    History of CVA (cerebrovascular accident)    S/P percutaneous endoscopic gastrostomy (PEG) tube placement    Mandel esophagus    Sacral decubitus ulcer  "   Oral phase dysphagia    Acute blood loss anemia    Orthostatic dizziness    Orthostatic hypotension    Bilateral leg weakness     Past Medical History:   Diagnosis Date    Anxiety     Arthritis     Atrial flutter     Status post cavotricuspid isthmus ablation by Dr. Gustafson on 4/18/17    Mandel esophagus     Benign essential hypertension     CAD (coronary artery disease)     3 vessel CABG 4/11/17 by Dr. Cortez: ROSARIO-prox LAD, SVG-OM1, SVG-OM3    Carotid artery disease     Status post carotid endarterectomy - USG 4/10/17: 50-59% NICHELLE, 1-15% LICA.     Colonic polyp     Cyst of pancreas     DDD (degenerative disc disease), lumbosacral     GERD (gastroesophageal reflux disease)     H/O bone density study 2013    H/O complete eye exam 2014    History of kidney stone     HLD (hyperlipidemia)     Hypertension     Kidney stone     8/22/22    Lipid screening 05/31/2013    Low back pain     [facility]    Screening for prostate cancer 07/07/2015    Skin cancer     nose    Stroke     RESIDUAL--\"BALANCE ISSUES\"     Past Surgical History:   Procedure Laterality Date    CARDIAC CATHETERIZATION N/A 04/10/2017    Procedure: Left Heart Cath;  Surgeon: Marjorie Healy MD;  Location:  [facility];  Service:     CARDIAC CATHETERIZATION N/A 04/10/2017    Procedure: Coronary angiography;  Surgeon: Marjorie Healy MD;  Location:  [facility];  Service:     CARDIAC CATHETERIZATION N/A 04/10/2017    Procedure: Left ventriculography;  Surgeon: Marjorie Healy MD;  Location:  [facility];  Service:     CARDIAC CATHETERIZATION  2011    CARDIAC ELECTROPHYSIOLOGY PROCEDURE N/A 04/18/2017    Procedure: Ablation atrial flutter;  Surgeon: Jose Antonio Gustafson MD;  Location:  [facility];  Service:     CAROTID ENDARTERECTOMY      CHOLECYSTECTOMY      CHOLECYSTECTOMY WITH INTRAOPERATIVE CHOLANGIOGRAM N/A 09/07/2019    Procedure: Laparoscopic cholecystectomy " with intraoperative cholangiogram;  Surgeon: Gauri Travsi MD;  Location: [facility] MAIN OR;  Service: General    COLONOSCOPY  01/06/2015    Diverticulosis, one TA    COLONOSCOPY N/A 02/14/2019    tics, NBIH, adenomatous polyp x 2    COLONOSCOPY N/A 11/05/2021    Procedure: COLONOSCOPY TO CECUM AND TERM. ILEUM WITH COLD POLYPECTOMIES;  Surgeon: Everton Abel MD;  [facility];  Service: Gastroenterology;  Laterality: N/A;  PRE OP - PERS H/O POLYPS  POST OP - COLON POLYPS,, DIVERTICULOSIS, HEMORRHOIDS    CORONARY ARTERY BYPASS GRAFT N/A 04/11/2017    Procedure: AR STERNOTOMY CORONARY ARTERY BYPASS GRAFT TIMES 3 USING LEFT INTERNAL MAMMARY ARTERY AND LEFT GREATER SAPHENOUS VEIN GRAFT PER ENDOSCOPIC VEIN HARVESTING AND PRP ;  Surgeon: Temo Cortez MD;  Location: [facility] MAIN OR;  Service:     ENDOSCOPY  01/06/2015    HH, Ervin's esophagus    ENDOSCOPY N/A 02/14/2019    Z line irregular, HH, Ervin's esophagus    ENDOSCOPY N/A 11/05/2021    Procedure: ESOPHAGOGASTRODUODENOSCOPY WITH BIOPSIES;  Surgeon: Everton Abel MD;  [facility];  Service: Gastroenterology;  Laterality: N/A;  PRE OP - PERS H/O ERVIN'S  POST OP - IRREG Z LINE    ENDOSCOPY W/ PEG TUBE PLACEMENT N/A 11/6/2023    Procedure: ESOPHAGOGASTRODUODENOSCOPY WITH PERCUTANEOUS ENDOSCOPIC GASTROSTOMY TUBE INSERTION;  Surgeon: Temo Ramsey MD;  [facility];  Service: General;  Laterality: N/A;  Pre: dysphagia  Post: same    KNEE SURGERY Left     URETEROSCOPY LASER LITHOTRIPSY WITH STENT INSERTION Left 8/23/2022    Procedure: Cysto retrograde with left uretro stent placement;  Surgeon: Damien Oliveira MD;  Location: [facility] MAIN OR;  Service: Urology;  Laterality: Left;    VASECTOMY        General Information       Row Name 01/11/24 1559          Physical Therapy Time and Intention    Document Type therapy note (daily note)  -     Mode of Treatment occupational therapy;physical  therapy;co-treatment  -       Row Name 01/11/24 1559          General Information    Patient Profile Reviewed yes  -     Existing Precautions/Restrictions fall  -     Barriers to Rehab medically complex;previous functional deficit  -       Row Name 01/11/24 1559          Cognition    Orientation Status (Cognition) oriented x 3  -       Row Name 01/11/24 1559          Safety Issues, Functional Mobility    Impairments Affecting Function (Mobility) balance;endurance/activity tolerance;strength  -     Comment, Safety Issues/Impairments (Mobility) needs extra time, appropriate for co-tx due to patient endurance and safety, req assist of 2 to maximize therapy benefits for mobility and ADL training.  -               User Key  (r) = Recorded By, (t) = Taken By, (c) = Cosigned By      Initials Name Provider Type    Marisol Baldwin PTA Physical Therapist Assistant                   Mobility       Row Name 01/11/24 1603          Bed Mobility    Sit-Supine [place] (Bed Mobility) moderate assist (50% patient effort);1 person assist;2 person assist;verbal cues  for LEs, with guidance of shldrs into appropriate positioning in bed  -     Comment, (Bed Mobility) up on EOB w/OT upon arrival  -       Row Name 01/11/24 1603          Sit-Stand Transfer    Sit-Stand [place] (Transfers) 2 person assist;moderate assist (50% patient effort);1 person to manage equipment  -     Assistive Device (Sit-Stand Transfers) walker, front-wheeled  -[facility] Name 01/11/24 1603          Gait/Stairs (Locomotion)    [place] Level (Gait) 2 person assist;moderate assist (50% patient effort);1 person to manage equipment  -     Assistive Device (Gait) walker, front-wheeled  [facility]     Distance in Feet (Gait) 15  -     Deviations/Abnormal Patterns (Gait) jemal decreased;festinating/shuffling;base of support, narrow;weight shifting decreased;stride length decreased  -     Bilateral Gait Deviations forward flexed  posture  -     Left Sided Gait Deviations leans left  -               User Key  (r) = Recorded By, (t) = Taken By, (c) = Cosigned By      Initials Name Provider Type    Marisol Baldwin PTA Physical Therapist Assistant                   Obj/Interventions       Row Name 01/11/24 1606          Motor Skills    Therapeutic Exercise --  LAQs x5  -               User Key  (r) = Recorded By, (t) = Taken By, (c) = Cosigned By      Initials Name Provider Type    Marisol Baldwin PTA Physical Therapist Assistant                   Goals/Plan    No documentation.                  Clinical Impression       Row Name 01/11/24 1606          Pain    Pain Location - back  -     Pre/Posttreatment Pain Comment did not really commit to a number  -     Pain Intervention(s) Repositioned;Rest  modified log roll into bed  -       Row Name 01/11/24 1606          Plan of Care Review    Plan of Care Reviewed With patient  -     Progress improving  -     Outcome Evaluation Pt agreed to PT/OT session, pt improved w/bed mobility, tfers and amb, bassem amb ~15ft in total, with rest breaks to complete , constant cues for foot placement in rwx and sequencing, pt very unstaedy , L lean during amb, but followed directions well when he heard them, pt willl need SNU at [place] to maximize functional outcome  -       Row Name 01/11/24 1606          Therapy Assessment/Plan (PT)    Rehab Potential (PT) good, to achieve stated therapy goals  -     Criteria for Skilled Interventions Met (PT) yes  -       Row Name 01/11/24 1606          Positioning and Restraints    Pre-Treatment Position in bed  -     Post Treatment Position bed  -               User Key  (r) = Recorded By, (t) = Taken By, (c) = Cosigned By      Initials Name Provider Type    Marisol Baldwin PTA Physical Therapist Assistant                   Outcome Measures       Row Name 01/11/24 1612 01/11/24 1400       How much help from another person do you currently  need...    Turning from your back to your side while in flat bed without using bedrails? 2  -JM 2  -LV    Moving from lying on back to sitting on the side of a flat bed without bedrails? 2  -JM 2  -LV    Moving to and from a bed to a chair (including a wheelchair)? 2  -JM 1  -LV    Standing up from a chair using your arms (e.g., wheelchair, bedside chair)? 2  -JM 1  -LV    Climbing 3-5 steps with a railing? 1  -JM 1  -LV    To walk in hospital room? 2  -JM 1  -LV    AM-PAC 6 Clicks Score (PT) 11  -JM 8  -LV    Highest Level of Mobility Goal 4 --> Transfer to chair/commode  -JM 3 --> Sit at edge of bed  -LV      Row Name 01/11/24 0800          How much help from another person do you currently need...    Turning from your back to your side while in flat bed without using bedrails? 2  -LV     Moving from lying on back to sitting on the side of a flat bed without bedrails? 2  -LV     Moving to and from a bed to a chair (including a wheelchair)? 1  -LV     Standing up from a chair using your arms (e.g., wheelchair, bedside chair)? 1  -LV     Climbing 3-5 steps with a railing? 1  -LV     To walk in hospital room? 1  -LV     AM-PAC 6 Clicks Score (PT) 8  -LV     Highest Level of Mobility Goal 3 --> Sit at edge of bed  -LV       Row Name 01/11/24 1558          Modified Oren Scale    Modified Oren Scale 4 - Moderately severe disability.  Unable to walk without assistance, and unable to attend to own bodily needs without assistance.  -       Row Name 01/11/24 1558          Functional Assessment    Outcome Measure Options AM-PAC 6 Clicks Daily Activity (OT);Modified Oren  -               User Key  (r) = Recorded By, (t) = Taken By, (c) = Cosigned By      Initials Name Provider Type    Marisol Baldwin, PTA Physical Therapist Assistant    Alissa Bailey, OT Occupational Therapist    LV Francia Camarillo, RN Registered Nurse                                 Physical Therapy Education       Title: PT OT SLP  Therapies (Done)       Topic: Physical Therapy (Done)       Point: Mobility training (Done)       Learning Progress Summary             Patient Acceptance, E,TB,D, VU,NR by  at 1/11/2024 1613    Acceptance, E,TB,D, VU,NR by  at 1/9/2024 1557    Acceptance, E,TB,D, VU,NR by  at 1/8/2024 1010                         Point: Home exercise program (Done)       Learning Progress Summary             Patient Acceptance, E,TB,D, VU,NR by  at 1/11/2024 1613    Acceptance, E,TB,D, VU,NR by  at 1/9/2024 1557    Acceptance, E,TB,D, VU,NR by  at 1/8/2024 1010                         Point: Body mechanics (Done)       Learning Progress Summary             Patient Acceptance, E,TB,D, VU,NR by  at 1/11/2024 1613    Acceptance, E,TB,D, VU,NR by  at 1/9/2024 1557    Acceptance, E,TB,D, VU,NR by  at 1/8/2024 1010                         Point: Precautions (Done)       Learning Progress Summary             Patient Acceptance, E,TB,D, VU,NR by  at 1/11/2024 1613    Acceptance, E,TB,D, VU,NR by  at 1/9/2024 1557    Acceptance, E,TB,D, VU,NR by  at 1/8/2024 1010                                         User Key       Initials Effective Dates Name Provider Type Discipline     06/16/21 -  Blanca Elkins, PT Physical Therapist PT     03/07/18 -  Marisol Aburto PTA Physical Therapist Assistant PT                  PT Recommendation and Plan     Plan of Care Reviewed With: patient  Progress: improving  Outcome Evaluation: Pt agreed to PT/OT session, pt improved w/bed mobility, tfers and amb, bassem amb ~15ft in total, with rest breaks to complete , constant cues for foot placement in rwx and sequencing, pt very unstaedy , L lean during amb, but followed directions well when he heard them, pt willl need SNU at [place] to maximize functional outcome     Time Calculation:         PT Charges       Row Name 01/11/24 1613             Time Calculation    Start Time 1412  -      Stop Time 1435  -      Time Calculation (min)  23 min  -BHAVESH      PT Received On 01/11/24  -BHAVESH      PT - Next Appointment 01/12/24  -BHAVESH                User Key  (r) = Recorded By, (t) = Taken By, (c) = Cosigned By      Initials Name Provider Type    Marisol Baldwin PTA Physical Therapist Assistant                  Therapy Charges for Today       Code Description Service Date Service Provider Modifiers Qty    31775403841 HC PT THER PROC EA 15 MIN 1/11/2024 Marisol Aburto PTA GP 2    29700985246 HC PT THER SUPP EA 15 MIN 1/11/2024 Marisol Aburto PTA GP 2            PT G-Codes  Outcome Measure Options: AM-PAC 6 Clicks Daily Activity (OT), Modified [place]  AM-PAC 6 Clicks Score (PT): 11  AM-PAC 6 Clicks Score (OT): 15  Modified Oren Scale: 4 - Moderately severe disability.  Unable to walk without assistance, and unable to attend to own bodily needs without assistance.  PT Discharge Summary  Anticipated Discharge Disposition (PT): skilled nursing facility    Marisol Aburto PTA  1/11/2024

## 2024-01-11 NOTE — CONSULTS
[facility] NEUROSURGERY CONSULT NOTE    Patient name: Madhu Santoro  Referring Provider: April Sheridan  Reason for Consultation: bulging disc on MRI    Patient Care Team:  Damian Sanchez MD as PCP - General (Family Medicine)  Jr Temo Cortez MD as Surgeon (Cardiothoracic Surgery)  Netta Mayorga Jr., MD as Consulting Physician (Vascular Surgery)  Damian Sanchez MD as Referring Physician (Family Medicine)  Aquiles Lopez MD as Consulting Physician (Hematology and Oncology)  Christy Luther APRN as Nurse Practitioner (Nurse Practitioner)  Damian Sanchez MD as Referring Physician (Family Medicine)    Chief complaint: back pain, fall    Subjective .     History of present illness:    Patient is a 78 y.o.  male  recently discharged from subacute rehab after prolonged hospitalization for COVID.  Returned home and became weak falling several times.  Findings of orthostatic hypotension in ER.     Patient has prior history of stroke and states he has had balance issues since that time.  He has chronic low back pain for which she states he sees pain management and is prescribed Norco 3 times daily.  He has not had any change in the quality or severity of his low back pain along his belt line.  He denies any radicular features with numbness tingling or pain in his legs.  On October 23 he was admitted to the hospital with COVID but the day prior had been acting normally and worked all day in his yard, met with friends, and had dinner.  The next day he was very weak and his legs gave out.  Since that time he has remained weak.  He does not recall doing anything overly exertional the day before this but again was working in his yard all day.  He does have a sense of heaviness in his legs and some in his arms but mostly in the legs.  He has chronic urinary incontinence which sounds like urge incontinence as he is on Myrbetriq with good control.  He denies any focal weakness in his arms but does feel like they are  "generally weak.  Has some chronic neck pain but nothing acute.  He does report chronic peripheral neuropathy for which she follows with Dr. Russell.  He takes Elavil for this.    Hx CAD s/p CABG, carotid stenosis s/p CEA, A-fib, stroke, JAK2 mutation, polycythemia on Eliquis. Remote smoker. No prior spine surgery. Skin CA history.    Review of Systems  Review of Systems   Constitutional:  Positive for fatigue. Negative for chills and fever.   Genitourinary:  Positive for enuresis (chronic) and urgency.   Musculoskeletal:  Positive for back pain, gait problem and neck pain.   Neurological:  Positive for weakness and numbness.       History  PAST MEDICAL HISTORY  Past Medical History:   Diagnosis Date    Anxiety     Arthritis     Atrial flutter     Status post cavotricuspid isthmus ablation by Dr. Gustafson on 4/18/17    Mandel esophagus     Benign essential hypertension     CAD (coronary artery disease)     3 vessel CABG 4/11/17 by Dr. Cortez: ROSARIO-prox LAD, SVG-OM1, SVG-OM3    Carotid artery disease     Status post carotid endarterectomy - [facility] 4/10/17: 50-59% NICHELLE, 1-15% LICA.     Colonic polyp     Cyst of pancreas     DDD (degenerative disc disease), lumbosacral     GERD (gastroesophageal reflux disease)     H/O bone density study 2013    H/O complete eye exam 2014    History of kidney stone     HLD (hyperlipidemia)     Hypertension     Kidney stone     8/22/22    Lipid screening 05/31/2013    Low back pain     physical therapy [facility] 5-12-10    Screening for prostate cancer 07/07/2015    Skin cancer     nose    Stroke     RESIDUAL--\"BALANCE ISSUES\"       PAST SURGICAL HISTORY  Past Surgical History:   Procedure Laterality Date    CARDIAC CATHETERIZATION N/A 04/10/2017    Procedure: Left Heart Cath;  Surgeon: Marjorie Healy MD;  Location:  [facility] CATH INVASIVE LOCATION;  Service:     CARDIAC CATHETERIZATION N/A 04/10/2017    Procedure: Coronary angiography;  Surgeon: Marjorie Healy MD;  Location:  [facility] CATH " INVASIVE LOCATION;  Service:     CARDIAC CATHETERIZATION N/A 04/10/2017    Procedure: Left ventriculography;  Surgeon: Marjorie Healy MD;  Location: [facility] CATH INVASIVE LOCATION;  Service:     CARDIAC CATHETERIZATION  2011    CARDIAC ELECTROPHYSIOLOGY PROCEDURE N/A 04/18/2017    Procedure: Ablation atrial flutter;  Surgeon: Jose Antonio Gustafson MD;  Location: [facility] CATH INVASIVE LOCATION;  Service:     CAROTID ENDARTERECTOMY      CHOLECYSTECTOMY      CHOLECYSTECTOMY WITH INTRAOPERATIVE CHOLANGIOGRAM N/A 09/07/2019    Procedure: Laparoscopic cholecystectomy with intraoperative cholangiogram;  Surgeon: Gauri Travis MD;  Location: [facility] OR;  Service: General    COLONOSCOPY  01/06/2015    Diverticulosis, one TA    COLONOSCOPY N/A 02/14/2019    tics, NBIH, adenomatous polyp x 2    COLONOSCOPY N/A 11/05/2021    Procedure: COLONOSCOPY TO CECUM AND TERM. ILEUM WITH COLD POLYPECTOMIES;  Surgeon: Everton Abel MD;  Location: [facility] ENDOSCOPY;  Service: Gastroenterology;  Laterality: N/A;  PRE OP - PERS H/O POLYPS  POST OP - COLON POLYPS,, DIVERTICULOSIS, HEMORRHOIDS    CORONARY ARTERY BYPASS GRAFT N/A 04/11/2017    Procedure: AR STERNOTOMY CORONARY ARTERY BYPASS GRAFT TIMES 3 USING LEFT INTERNAL MAMMARY ARTERY AND LEFT GREATER SAPHENOUS VEIN GRAFT PER ENDOSCOPIC VEIN HARVESTING AND PRP ;  Surgeon: Temo Cortez MD;  Location: [facility] MAIN OR;  Service:     ENDOSCOPY  01/06/2015    HH, Ervin's esophagus    ENDOSCOPY N/A 02/14/2019    Z line irregular, HH, Ervin's esophagus    ENDOSCOPY N/A 11/05/2021    Procedure: ESOPHAGOGASTRODUODENOSCOPY WITH BIOPSIES;  Surgeon: Everton Abel MD;  Location: [facility] ENDOSCOPY;  Service: Gastroenterology;  Laterality: N/A;  PRE OP - PERS H/O ERVIN'S  POST OP - IRREG Z LINE    ENDOSCOPY W/ PEG TUBE PLACEMENT N/A 11/6/2023    Procedure: ESOPHAGOGASTRODUODENOSCOPY WITH PERCUTANEOUS ENDOSCOPIC GASTROSTOMY TUBE INSERTION;  Surgeon: Temo Ramsey MD;  Location:   [facility] ENDOSCOPY;  Service: General;  Laterality: N/A;  Pre: dysphagia  Post: same    KNEE SURGERY Left     URETEROSCOPY LASER LITHOTRIPSY WITH STENT INSERTION Left 8/23/2022    Procedure: Cysto retrograde with left uretro stent placement;  Surgeon: Damien Oliveira MD;  Location: [facility] MAIN OR;  Service: Urology;  Laterality: Left;    VASECTOMY         FAMILY HISTORY  Family History   Problem Relation Age of Onset    Hypertension Mother     Heart disease Mother     Heart attack Mother     Stroke Mother     Heart disease Father     Heart attack Father     Stroke Father     Hypertension Sister     Heart attack Brother     Heart disease Brother     No Known Problems Brother     Heart disease Brother     Heart attack Brother     Diabetes Brother     No Known Problems Maternal Grandmother     No Known Problems Maternal Grandfather     No Known Problems Paternal Grandmother     No Known Problems Paternal Grandfather     Pancreatic cancer Paternal Cousin     Arthritis Other     Diabetes Other     Hypertension Other     Kidney disease Other         stones    Malig Hyperthermia Neg Hx        SOCIAL HISTORY  Social History     Tobacco Use    Smoking status: Former     Packs/day: 1     Types: Cigarettes     Start date: 1/1/1959     Quit date: 1/1/2009     Years since quitting: 15.0    Smokeless tobacco: Never    Tobacco comments:     CAFFEINE USE   Vaping Use    Vaping Use: Never used   Substance Use Topics    Alcohol use: Not Currently     Comment: rarely    Drug use: No       retired  Lives with wife    Allergies:  Atorvastatin and Penicillins    MEDICATIONS:  Facility-Administered Medications Prior to Admission   Medication Dose Route Frequency Provider Last Rate Last Admin    cyanocobalamin injection 1,000 mcg  1,000 mcg Intramuscular Q30 Days Damian Sanchez MD   1,000 mcg at 04/06/23 0957     Medications Prior to Admission   Medication Sig Dispense Refill Last Dose    allopurinol (ZYLOPRIM) 300 MG tablet  Take 1 tablet by mouth Daily.   1/7/2024    amitriptyline (ELAVIL) 50 MG tablet Administer 1 tablet per G tube Every Night.   1/6/2024    amLODIPine (NORVASC) 5 MG tablet Administer 1 tablet per G tube Daily.   1/7/2024    apixaban (ELIQUIS) 2.5 MG tablet tablet Take 1 tablet by mouth Every 12 (Twelve) Hours. Indications: Atrial Fibrillation   1/7/2024    HYDROcodone-acetaminophen (Norco) 7.5-325 MG per tablet Take 1 tablet by mouth 3 (Three) Times a Day. Hold if sedation (Patient taking differently: Take 1 tablet by mouth 4 (Four) Times a Day As Needed for Moderate Pain or Severe Pain. Hold if sedation) 12 tablet 0 1/7/2024    lansoprazole (PREVACID SOLUTAB) 30 MG Tablet Delayed Release Dispersible disintegrating tablet Administer 1 tablet per G tube 2 (Two) Times a Day Before Meals.   Past Week    rosuvastatin (Crestor) 20 MG tablet Administer 1 tablet per G tube Every Night.   1/7/2024    sucralfate (CARAFATE) 1 g tablet Administer 1 tablet per G tube 2 (Two) Times a Day Before Meals.   Past Week    acetaminophen (TYLENOL) 325 MG tablet Administer 2 tablets per G tube Every 6 (Six) Hours As Needed for Mild Pain.   More than a month    guaifenesin (ROBITUSSIN) 100 MG/5ML liquid Take 10 mL by mouth Every 4 (Four) Hours As Needed for Cough.   More than a month    ipratropium-albuterol (DUO-NEB) 0.5-2.5 mg/3 ml nebulizer Take 3 mL by nebulization Every 4 (Four) Hours As Needed for Shortness of Air.   More than a month    Menthol-Zinc Oxide 0.44-20.6 % ointment Apply 1 application  topically to the appropriate area as directed Every 12 (Twelve) Hours.       sennosides-docusate (PERICOLACE) 8.6-50 MG per tablet Administer 2 tablets per G tube 2 (Two) Times a Day As Needed for Constipation.   More than a month       Current Facility-Administered Medications:     acetaminophen (TYLENOL) tablet 650 mg, 650 mg, Oral, Q4H PRN **OR** acetaminophen (TYLENOL) 160 MG/5ML oral solution 650 mg, 650 mg, Oral, Q4H PRN **OR**  acetaminophen (TYLENOL) suppository 650 mg, 650 mg, Rectal, Q4H PRN, Salima Rogers MD    acetaminophen (TYLENOL) tablet 650 mg, 650 mg, Per G Tube, Q6H PRN, Salima Rogers MD    allopurinol (ZYLOPRIM) tablet 300 mg, 300 mg, Oral, Daily, April Sheridan APRN, 300 mg at 01/11/24 0803    amitriptyline (ELAVIL) tablet 50 mg, 50 mg, Per G Tube, Nightly, Salima Rogers MD, 50 mg at 01/10/24 2107    amLODIPine (NORVASC) tablet 5 mg, 5 mg, Per G Tube, Daily, Salima Rogers MD, 5 mg at 01/11/24 0803    apixaban (ELIQUIS) tablet 2.5 mg, 2.5 mg, Per G Tube, Q12H, Christian Chan DO, 2.5 mg at 01/11/24 0803    Calcium Replacement - Follow Nurse / BPA Driven Protocol, , Does not apply, PRN, Salima Rogers MD    cefTRIAXone (ROCEPHIN) 1,000 mg in sodium chloride 0.9 % 100 mL IVPB-VTB, 1,000 mg, Intravenous, Q24H, April Sheridan APRN, Last Rate: 200 mL/hr at 01/10/24 1649, 1,000 mg at 01/10/24 1649    [START ON 1/31/2024] cyanocobalamin injection 1,000 mcg, 1,000 mcg, Intramuscular, Q30 Days, Salima Rogers MD    guaifenesin (ROBITUSSIN) 100 MG/5ML liquid 200 mg, 200 mg, Oral, Q4H PRN, Salima Rogers MD    HYDROcodone-acetaminophen (NORCO) 7.5-325 MG per tablet 1 tablet, 1 tablet, Per G Tube, TID, Christian Chan DO, 1 tablet at 01/11/24 0803    ipratropium-albuterol (DUO-NEB) nebulizer solution 3 mL, 3 mL, Nebulization, Q4H PRN, Salima Rogers MD    lansoprazole (PREVACID SOLUTAB) disintegrating tablet Tablet Delayed Release Dispersible 30 mg, 30 mg, Per G Tube, BID AC, Salima Rogers MD, 30 mg at 01/11/24 0803    Magnesium Standard Dose Replacement - Follow Nurse / BPA Driven Protocol, , Does not apply, PRN, Salima Rogers MD    Menthol-Zinc Oxide 1 application , 1 application , Topical, Q12H, Salima Rogers MD, 1 application  at 01/11/24 1245    nitroglycerin (NITROSTAT) SL tablet 0.4 mg, 0.4 mg, Sublingual, Q5 Min PRN, Salima Rogers MD    ondansetron (ZOFRAN) injection 4 mg, 4 mg, Intravenous, Q6H PRN, Salima Rogers MD    Phosphorus  Replacement - Follow Nurse / BPA Driven Protocol, , Does not apply, PRKen PHAM Jawed, MD    Potassium Replacement - Follow Nurse / BPA Driven Protocol, , Does not apply, Ken HUFF Jawed, MD    rosuvastatin (CRESTOR) tablet 20 mg, 20 mg, Per G Tube, Nightly, Salima Rogers MD, 20 mg at 01/10/24 2107    sennosides-docusate (PERICOLACE) 8.6-50 MG per tablet 2 tablet, 2 tablet, Per G Tube, BID PRN, Salima Rogers MD, 2 tablet at 01/09/24 2155    sodium chloride 0.9 % flush 10 mL, 10 mL, Intravenous, Q12H, Salima Rogers MD, 10 mL at 01/10/24 2107    sodium chloride 0.9 % flush 10 mL, 10 mL, Intravenous, PRN, Salima Rogers MD    sodium chloride 0.9 % infusion 40 mL, 40 mL, Intravenous, Ken HUFF Jawed, MD    sucralfate (CARAFATE) tablet 1 g, 1 g, Per G Tube, BID AC, Salima Rogers MD, 1 g at 01/11/24 0803    Objective     Results Review:  LABS:  Results from last 7 days   Lab Units 01/11/24  0425 01/10/24  0446 01/09/24  0320   WBC 10*3/mm3 10.96* 13.98* 13.18*   HEMOGLOBIN g/dL 11.1* 11.0* 11.3*   HEMATOCRIT % 35.2* 34.7* 36.4*   PLATELETS 10*3/mm3 439 441 491*     Results from last 7 days   Lab Units 01/11/24  0425 01/10/24  0446 01/09/24  1538 01/09/24  0320 01/08/24  0700 01/07/24  0928   SODIUM mmol/L 137 140  --  139 139 139   POTASSIUM mmol/L 4.4 4.0 4.6 3.6 3.9 4.3   CHLORIDE mmol/L 103 106  --  106 105 101   CO2 mmol/L 25.0 22.4  --  22.2 22.7 27.0   BUN mg/dL 14 14  --  11 13 11   CREATININE mg/dL 0.53* 0.57*  --  0.50* 0.59* 0.66*   CALCIUM mg/dL 8.9 8.7  --  8.6 8.8 9.7   BILIRUBIN mg/dL  --   --   --   --  0.3 0.3   ALK PHOS U/L  --   --   --   --  136* 153*   ALT (SGPT) U/L  --   --   --   --  17 17   AST (SGOT) U/L  --   --   --   --  30 39   GLUCOSE mg/dL 119* 122*  --  96 123* 164*     Blood culture 1/7-NGTD  Urinalysis trace protein, trace leuk esterase, positive nitrite, 3-5 WBC, 2+ bacteria  Procalcitonin 0.13  Vitamin B12 752  TSH 2.13    DIAGNOSTICS:  MRI lumbar spine reveals degenerative disc  disease at L3/4 and L4/5 with mild right greater than left neuroforaminal narrowing at L4/5.  There is mild retrolisthesis of L3 on 4.  No significant canal or foraminal stenosis at any level.    Results Review:   I reviewed the patient's new clinical results.  I personally viewed and interpreted the patient's MRI lumbar spine.  Also reviewed by discussed with Dr. Kennedy    Vital Signs   Temp:  [97.3 °F (36.3 °C)-98.4 °F (36.9 °C)] 97.5 °F (36.4 °C)  Heart Rate:  [79-88] 88  Resp:  [18] 18  BP: (140-155)/(54-71) 149/55    Physical Exam:  Physical Exam  Vitals reviewed.   Constitutional:       Comments: Chronically ill-appearing elderly male in no acute distress.  Pleasant and cooperative.  Wife at bedside.   Pulmonary:      Effort: Pulmonary effort is normal.   Musculoskeletal:      Cervical back: Normal range of motion and neck supple.      Lumbar back: Negative right straight leg raise test and negative left straight leg raise test.   Skin:     General: Skin is warm and dry.   Neurological:      General: No focal deficit present.      Mental Status: He is alert.      Deep Tendon Reflexes:      Reflex Scores:       Tricep reflexes are 1+ on the right side and 1+ on the left side.       Bicep reflexes are 2+ on the right side and 2+ on the left side.       Brachioradialis reflexes are 1+ on the right side and 1+ on the left side.       Patellar reflexes are 0 on the right side and 0 on the left side.  Psychiatric:         Mood and Affect: Mood normal.         Speech: Speech normal.         Thought Content: Thought content normal.       Neurologic Exam     Mental Status   Speech: speech is normal   Level of consciousness: alert  Knowledge: good.   Normal comprehension.     Motor Exam   Muscle bulk: normal  Right leg tone: increased  Left leg tone: increased  Bilateral upper extremity and bilateral iliopsoas 4/5 diffusely.  5/5 bilateral TA and gastroc, hamstring and quad     Sensory Exam   Right arm light touch:  normal  Left arm light touch: normal  Right leg light touch: decreased from ankle  Left leg light touch: decreased from ankle  Right leg proprioception: normal  Left leg proprioception: normal    Gait, Coordination, and Reflexes     Gait  Gait: (PT note 1/9-reports he feels very weak and stiff this date. Pt does seem to be more rigid with movement and had stronger posterior lean with standing resulting in difficulty with weight shifting and advancing his feet.)    Reflexes   Right brachioradialis: 1+  Left brachioradialis: 1+  Right biceps: 2+  Left biceps: 2+  Right triceps: 1+  Left triceps: 1+  Right patellar: 0  Left patellar: 0  Right plantar: upgoing  Left plantar: normal  Right Jimenez: absent  Left Jimneez: absent  Right ankle clonus: absent  Left ankle clonus: absent      Assessment & Plan       Orthostatic dizziness    Coronary artery disease due to lipid rich plaque    Benign essential hypertension    DDD (degenerative disc disease), lumbosacral    Gastroesophageal reflux disease    S/P ablation of atrial flutter    Typical atrial flutter    Chronic anticoagulation    Iron deficiency anemia    Polycythemia vera    History of CVA (cerebrovascular accident)    S/P percutaneous endoscopic gastrostomy (PEG) tube placement    Oral phase dysphagia    Orthostatic hypotension    Bilateral leg weakness    Patient with general medical decline over the last few months after having COVID presents back to the hospital after falling at home several times.  Upon review of PT notes, he is having significant gait issues and reports that since late October he has had a sense of heaviness in his legs as well as somewhat in his arms.  He has chronic back pain which is well-managed with stable dose of Norco.  He has mild neck discomfort but nothing acute.  On exam he has generalized weakness throughout bilateral upper and lower extremities.  He has upgoing right toe but no clonus or Jamila.  He has increased tone and some  "stiffness in his lower extremities.  He denies any sensory deficits and has normal proprioception.  I am concerned about myelopathic issues as MRI lumbar spine is unremarkable outside of some degenerative disc bulging but no canal or foraminal stenosis of significance.  He really does not have radicular nature although he does report a sense of discomfort in legs with prolonged standing or walking.  Recommend MRI cervical and thoracic spine to evaluate for any cord compression.  If this is unremarkable, weakness likely related to general decline from medical issues with a combination of prior stroke and peripheral neuropathy contributing to his imbalance.  Certainly needs ongoing therapy for gait and balance and strengthening.    PLAN:   MRI cervical and thoracic spine-we will see once complete  Cont PT    I discussed the patient's findings and my recommendations with patient, family, and Dr. Kennedy    During patient visit, I utilized appropriate personal protective equipment including gloves. Appropriate PPE was worn during the entire visit.  Hand hygiene was completed before and after.     Joanne Guevara, APRN  01/11/24  13:26 EST    \"Dictated utilizing Dragon dictation\".      "

## 2024-01-11 NOTE — CASE MANAGEMENT/SOCIAL WORK
"Physicians Statement of Medical Necessity for  Ambulance Transportation    GENERAL INFORMATION     Name: Madhu Santoro  YOB: 1945  Medicare #: [id number] , [employer] co #     [id number]     Transport Date: 1/12/2024 (Valid for round trips this date, or for scheduled repetitive trips for 60 days from the date signed below.)  Origin: [facility]  Destination: [place] [facility]  Is the Patient's stay covered under Medicare Part A (PPS/DRG?)Yes  Closest appropriate facility? Yes  If this a hosp-hosp transfer? No  Is this a hospice patient? No    MEDICAL NECESSITY QUESTIONAIRE    Ambulance Transportation is medically necessary only if other means of transportation are contraindicated or would be potentially harmful to the patient.  To meet this requirement, the patient must be either \"bed confined\" or suffer from a condition such that transport by means other than an ambulance is contraindicated by the patient's condition.  The following questions must be answered by the healthcare professional signing below for this form to be valid:     1) Describe the MEDICAL CONDITION (physical and/or mental) of this patient AT THE TIME OF AMBULANCE TRANSPORT that requires the patient to be transported in an ambulance, and why transport by other means is contraindicated by the patient's condition:   Problems Addressed this Visit          Other    Neuropathy    Relevant Medications    cyanocobalamin injection 1,000 mcg (Start on 1/31/2024  9:00 AM)    Orthostatic hypotension     Other Visit Diagnoses       Generalized weakness    -  Primary    Unable to ambulate        Bandemia              Diagnoses         Codes Comments    Generalized weakness    -  Primary ICD-10-CM: R53.1  ICD-9-CM: 780.79     Unable to ambulate     ICD-10-CM: R26.2  ICD-9-CM: 719.7     Bandemia     ICD-10-CM: D72.825  ICD-9-CM: 288.66     Orthostatic hypotension     ICD-10-CM: I95.1  ICD-9-CM: 458.0     " Neuropathy     ICD-10-CM: G62.9  ICD-9-CM: 355.9             Patient Active Problem List   Diagnosis    Anxiety    Arthritis    Coronary artery disease due to lipid rich plaque    HLD (hyperlipidemia)    Benign essential hypertension    DDD (degenerative disc disease), lumbosacral    Cerebrovascular accident    Encounter for screening for cardiovascular disorders    Gastroesophageal reflux disease    Hyperlipidemia    Stenosis of carotid artery    Former smoker    History of cardiac catheterization    S/P ablation of atrial flutter    Typical atrial flutter    S/P CABG (coronary artery bypass graft)    Adenomatous polyp of ascending colon    Abdominal bloating    Stroke    PAD (peripheral artery disease)    Acute cholecystitis    Right upper quadrant abdominal pain    Chronic pain of both hips    Chronic bilateral low back pain without sciatica    Sacroiliac joint dysfunction of both sides    Encounter for long-term (current) use of high-risk medication    Lumbar facet arthropathy    Stroke-like symptoms    CVA (cerebral vascular accident)    Personal history of colonic polyps    Arthralgia of multiple joints    Iron deficiency    Thrombocytosis    Left ureteral stone    Obstructive uropathy    Chronic anticoagulation    Facial skin lesion    Iron deficiency anemia    Polycythemia    Neuropathy    Weakness    Pneumonia    Close exposure to COVID-19 virus    Polycythemia vera    Chronic gout without tophus    COVID-19    Cytokine release syndrome, grade 2    Anemia    History of CVA (cerebrovascular accident)    S/P percutaneous endoscopic gastrostomy (PEG) tube placement    Mandel esophagus    Sacral decubitus ulcer    Oral phase dysphagia    Acute blood loss anemia    Orthostatic dizziness    Orthostatic hypotension    Bilateral leg weakness       Past Medical History:   Diagnosis Date    Anxiety     Arthritis     Atrial flutter     Status post cavotricuspid isthmus ablation by Dr. Gustafson on 4/18/17    Ministerio  "esophagus     Benign essential hypertension     CAD (coronary artery disease)     3 vessel CABG 4/11/17 by Dr. Cortez: ROSARIO-prox LAD, SVG-OM1, SVG-OM3    Carotid artery disease     Status post carotid endarterectomy - [facility]G 4/10/17: 50-59% NICHELLE, 1-15% LICA.     Colonic polyp     Cyst of pancreas     DDD (degenerative disc disease), lumbosacral     GERD (gastroesophageal reflux disease)     H/O bone density study 2013    H/O complete eye exam 2014    History of kidney stone     HLD (hyperlipidemia)     Hypertension     Kidney stone     8/22/22    Lipid screening 05/31/2013    Low back pain     physical therapy [facility] 5-12-10    Screening for prostate cancer 07/07/2015    Skin cancer     nose    Stroke     RESIDUAL--\"BALANCE ISSUES\"      Past Surgical History:   Procedure Laterality Date    CARDIAC CATHETERIZATION N/A 04/10/2017    Procedure: Left Heart Cath;  Surgeon: Marjorie Healy MD;  Location: [facility] CATH INVASIVE LOCATION;  Service:     CARDIAC CATHETERIZATION N/A 04/10/2017    Procedure: Coronary angiography;  Surgeon: Marjorie Healy MD;  Location:  [facility] CATH INVASIVE LOCATION;  Service:     CARDIAC CATHETERIZATION N/A 04/10/2017    Procedure: Left ventriculography;  Surgeon: Marjorie Healy MD;  [facility];  Service:     CARDIAC CATHETERIZATION  2011    CARDIAC ELECTROPHYSIOLOGY PROCEDURE N/A 04/18/2017    Procedure: Ablation atrial flutter;  Surgeon: Jose Antonio Gustafson MD;  Location: [facility] CATH INVASIVE LOCATION;  Service:     CAROTID ENDARTERECTOMY      CHOLECYSTECTOMY      CHOLECYSTECTOMY WITH INTRAOPERATIVE CHOLANGIOGRAM N/A 09/07/2019    Procedure: Laparoscopic cholecystectomy with intraoperative cholangiogram;  Surgeon: Gauri Travis MD;  Location: [facility] MAIN OR;  Service: General    COLONOSCOPY  01/06/2015    Diverticulosis, one TA    COLONOSCOPY N/A 02/14/2019    tics, NBIH, adenomatous polyp x 2    COLONOSCOPY N/A 11/05/2021    Procedure: COLONOSCOPY TO CECUM " "AND TERM. ILEUM WITH COLD POLYPECTOMIES;  Surgeon: Everton Abel MD;  Location: [facility] ENDOSCOPY;  Service: Gastroenterology;  Laterality: N/A;  PRE OP - PERS H/O POLYPS  POST OP - COLON POLYPS,, DIVERTICULOSIS, HEMORRHOIDS    CORONARY ARTERY BYPASS GRAFT N/A 04/11/2017    Procedure: AR STERNOTOMY CORONARY ARTERY BYPASS GRAFT TIMES 3 USING LEFT INTERNAL MAMMARY ARTERY AND LEFT GREATER SAPHENOUS VEIN GRAFT PER ENDOSCOPIC VEIN HARVESTING AND PRP ;  Surgeon: Temo Cortez MD;  Location: [facility] MAIN OR;  Service:     ENDOSCOPY  01/06/2015    HH, Ervin's esophagus    ENDOSCOPY N/A 02/14/2019    Z line irregular, HH, Ervin's esophagus    ENDOSCOPY N/A 11/05/2021    Procedure: ESOPHAGOGASTRODUODENOSCOPY WITH BIOPSIES;  Surgeon: Everton Abel MD;  Location: [facility] ENDOSCOPY;  Service: Gastroenterology;  Laterality: N/A;  PRE OP - PERS H/O ERVIN'S  POST OP - IRREG Z LINE    ENDOSCOPY W/ PEG TUBE PLACEMENT N/A 11/6/2023    Procedure: ESOPHAGOGASTRODUODENOSCOPY WITH PERCUTANEOUS ENDOSCOPIC GASTROSTOMY TUBE INSERTION;  Surgeon: Temo Ramsey MD;  Location: [facility] ENDOSCOPY;  Service: General;  Laterality: N/A;  Pre: dysphagia  Post: same    KNEE SURGERY Left     URETEROSCOPY LASER LITHOTRIPSY WITH STENT INSERTION Left 8/23/2022    Procedure: Cysto retrograde with left uretro stent placement;  Surgeon: Damien Oliveira MD;  Location: [facility] MAIN OR;  Service: Urology;  Laterality: Left;    VASECTOMY        2) Is this patient \"bed confined\" as defined below?Yes   To be \"bed confined\" the patient must satisfy all three of the following criteria:  (1) unable to get up from bed without assistance; AND (2) unable to ambulate;  AND (3) unable to sit in a chair or wheelchair.  3) Can this patient safely be transported by car or wheelchair van (I.e., may safely sit during transport, without an attendant or monitoring?)No   4. In addition to completing questions 1-3 above, please check any of the " following conditions that apply*:          *Note: supporting documentation for any boxes checked must be maintained in the patient's medical records Medical attendant required, Unable to tolerate seated position for time needed to transport, and Other NONAMBULATORY, WEAKNESS, PEG TUBE, FALLS RISK      SIGNATURE OF PHYSICIAN OR OTHER AUTHORIZED HEALTHCARE PROFESSIONAL    I certify that the above information is true and correct based on my evaluation of this patient, and represent that the patient requires transport by ambulance and that other forms of transport are contraindicated.  I understand that this information will be used by the Centers for Medicare and Medicaid Services (CMS) to support the determiniation of medical necessity for ambulance services, and I represent that I have personal knowledge of the patient's condition at the time of transport.       If this box is checked, I also certify that the patient is physically or mentally incapable of signing the ambulance service's claim form and that the institution with which I am affiliated has furnished care, services or assistance to the patient.  My signature below is made on behalf of the patient pursuant to 42 .36(b)(4). In accordance with 42 .37, the specific reason(s) that the patient is physically or mentally incapable of signing the claim for is as follows:     Signature of Physician or Healthcare Professional  Date/Time:   1/11/2024 17:12 EST      (For Scheduled repetitive transport, this form is not valid for transports performed more than 60 days after this date).                        Monica Russell RN                                                                                                                      --------------------------------------------------------------------------------------------  Printed Name and Credentials of Physician or Authorized Healthcare Professional     *Form must be signed by patient's  attending physician for scheduled, repetitive transports,.  For non-repetitive ambulance transports, if unable to obtain the signature of the attending physician, any of the following may sign (please select below):     Physician  Clinical Nurse Specialist  Registered Nurse     Physician Assistant  Discharge Planner  Licensed Practical Nurse     Nurse Practitioner

## 2024-01-11 NOTE — PLAN OF CARE
Problem: Adult Inpatient Plan of Care  Goal: Plan of Care Review  Outcome: Ongoing, Progressing  Flowsheets  Taken 1/11/2024 0509 by Kelsea Rivera, RN  Plan of Care Reviewed With: patient  Outcome Evaluation: Tolerated NG tube feeding, no residuals. slept well and denied any pain this shift. WCTM.  Taken 1/8/2024 1840 by Trang Huff, RN  Progress: improving   Goal Outcome Evaluation:  Plan of Care Reviewed With: patient           Outcome Evaluation: Tolerated NG tube feeding, no residuals. slept well and denied any pain this shift. WCTM.

## 2024-01-11 NOTE — THERAPY TREATMENT NOTE
Patient Name: Madhu Santoro  : 1945    MRN: [id number]                              Today's Date: 2024       Admit Date: 2024    Visit Dx:     ICD-10-CM ICD-9-CM   1. Generalized weakness  R53.1 780.79   2. Unable to ambulate  R26.2 719.7   3. Bandemia  D72.825 288.66   4. Orthostatic hypotension  I95.1 458.0   5. Neuropathy  G62.9 355.9     Patient Active Problem List   Diagnosis    Anxiety    Arthritis    Coronary artery disease due to lipid rich plaque    HLD (hyperlipidemia)    Benign essential hypertension    DDD (degenerative disc disease), lumbosacral    Cerebrovascular accident    Encounter for screening for cardiovascular disorders    Gastroesophageal reflux disease    Hyperlipidemia    Stenosis of carotid artery    Former smoker    History of cardiac catheterization    S/P ablation of atrial flutter    Typical atrial flutter    S/P CABG (coronary artery bypass graft)    Adenomatous polyp of ascending colon    Abdominal bloating    Stroke    PAD (peripheral artery disease)    Acute cholecystitis    Right upper quadrant abdominal pain    Chronic pain of both hips    Chronic bilateral low back pain without sciatica    Sacroiliac joint dysfunction of both sides    Encounter for long-term (current) use of high-risk medication    Lumbar facet arthropathy    Stroke-like symptoms    CVA (cerebral vascular accident)    Personal history of colonic polyps    Arthralgia of multiple joints    Iron deficiency    Thrombocytosis    Left ureteral stone    Obstructive uropathy    Chronic anticoagulation    Facial skin lesion    Iron deficiency anemia    Polycythemia    Neuropathy    Weakness    Pneumonia    Close exposure to COVID-19 virus    Polycythemia vera    Chronic gout without tophus    COVID-19    Cytokine release syndrome, grade 2    Anemia    History of CVA (cerebrovascular accident)    S/P percutaneous endoscopic gastrostomy (PEG) tube placement    Mandel esophagus    Sacral decubitus ulcer  "   Oral phase dysphagia    Acute blood loss anemia    Orthostatic dizziness    Orthostatic hypotension    Bilateral leg weakness     Past Medical History:   Diagnosis Date    Anxiety     Arthritis     Atrial flutter     Status post cavotricuspid isthmus ablation by Dr. Gustafson on 4/18/17    Mandel esophagus     Benign essential hypertension     CAD (coronary artery disease)     3 vessel CABG 4/11/17 by Dr. Cortez: ROSARIO-prox LAD, SVG-OM1, SVG-OM3    Carotid artery disease     Status post carotid endarterectomy - USG 4/10/17: 50-59% NICHELLE, 1-15% LICA.     Colonic polyp     Cyst of pancreas     DDD (degenerative disc disease), lumbosacral     GERD (gastroesophageal reflux disease)     H/O bone density study 2013    H/O complete eye exam 2014    History of kidney stone     HLD (hyperlipidemia)     Hypertension     Kidney stone     8/22/22    Lipid screening 05/31/2013    Low back pain     [facility]    Screening for prostate cancer 07/07/2015    Skin cancer     nose    Stroke     RESIDUAL--\"BALANCE ISSUES\"     Past Surgical History:   Procedure Laterality Date    CARDIAC CATHETERIZATION N/A 04/10/2017    Procedure: Left Heart Cath;  Surgeon: Marjorie Healy MD;  Location:  [facility];  Service:     CARDIAC CATHETERIZATION N/A 04/10/2017    Procedure: Coronary angiography;  Surgeon: Marjorie Healy MD;  Location:  [facility];  Service:     CARDIAC CATHETERIZATION N/A 04/10/2017    Procedure: Left ventriculography;  Surgeon: Marjorie Healy MD;  Location:  [facility];  Service:     CARDIAC CATHETERIZATION  2011    CARDIAC ELECTROPHYSIOLOGY PROCEDURE N/A 04/18/2017    Procedure: Ablation atrial flutter;  Surgeon: Jose Antonio Gustafson MD;  Location:  [facility];  Service:     CAROTID ENDARTERECTOMY      CHOLECYSTECTOMY      CHOLECYSTECTOMY WITH INTRAOPERATIVE CHOLANGIOGRAM N/A 09/07/2019    Procedure: Laparoscopic cholecystectomy " with intraoperative cholangiogram;  Surgeon: Gauri Travis MD;  Location: [facility] MAIN OR;  Service: General    COLONOSCOPY  01/06/2015    Diverticulosis, one TA    COLONOSCOPY N/A 02/14/2019    tics, NBIH, adenomatous polyp x 2    COLONOSCOPY N/A 11/05/2021    Procedure: COLONOSCOPY TO CECUM AND TERM. ILEUM WITH COLD POLYPECTOMIES;  Surgeon: Everton Abel MD;  [facility];  Service: Gastroenterology;  Laterality: N/A;  PRE OP - PERS H/O POLYPS  POST OP - COLON POLYPS,, DIVERTICULOSIS, HEMORRHOIDS    CORONARY ARTERY BYPASS GRAFT N/A 04/11/2017    Procedure: AR STERNOTOMY CORONARY ARTERY BYPASS GRAFT TIMES 3 USING LEFT INTERNAL MAMMARY ARTERY AND LEFT GREATER SAPHENOUS VEIN GRAFT PER ENDOSCOPIC VEIN HARVESTING AND PRP ;  Surgeon: Temo Cortez MD;  Location: [facility] MAIN OR;  Service:     ENDOSCOPY  01/06/2015    HH, Ervin's esophagus    ENDOSCOPY N/A 02/14/2019    Z line irregular, HH, Ervin's esophagus    ENDOSCOPY N/A 11/05/2021    Procedure: ESOPHAGOGASTRODUODENOSCOPY WITH BIOPSIES;  Surgeon: Everton Abel MD;  [facility];  Service: Gastroenterology;  Laterality: N/A;  PRE OP - PERS H/O ERVIN'S  POST OP - IRREG Z LINE    ENDOSCOPY W/ PEG TUBE PLACEMENT N/A 11/6/2023    Procedure: ESOPHAGOGASTRODUODENOSCOPY WITH PERCUTANEOUS ENDOSCOPIC GASTROSTOMY TUBE INSERTION;  Surgeon: Temo Ramsey MD;  [facility];  Service: General;  Laterality: N/A;  Pre: dysphagia  Post: same    KNEE SURGERY Left     URETEROSCOPY LASER LITHOTRIPSY WITH STENT INSERTION Left 8/23/2022    Procedure: Cysto retrograde with left uretro stent placement;  Surgeon: Damien Oliveira MD;  Location: [facility] MAIN OR;  Service: Urology;  Laterality: Left;    VASECTOMY        General Information       Row Name 01/11/24 1553          OT Time and Intention    Document Type therapy note (daily note)  -     Mode of Treatment occupational therapy;physical therapy;co-treatment   -       Row Name 01/11/24 1553          General Information    Patient Profile Reviewed yes  -     Prior Level of Function independent:  -     Existing Precautions/Restrictions fall  -     Barriers to Rehab medically complex;previous functional deficit  -[facility] Name 01/11/24 1553          Living Environment    People in Home spouse  -[facility] Name 01/11/24 1553          Home Main Entrance    Number of Stairs, Main Entrance two  -[facility] Name 01/11/24 1553          Cognition    Orientation Status (Cognition) oriented x 3  -[facility] Name 01/11/24 1553          Safety Issues, Functional Mobility    Impairments Affecting Function (Mobility) balance;endurance/activity tolerance;strength  -     Comment, Safety Issues/Impairments (Mobility) PT/OT cotreatment medically appropriate and necessary due to patient acuity level, to maximize therapeutic benefit due to impaired act tolerance, and for safety of patient and staff. Treatment focused on progression of care and goals established in POC.  -               User Key  (r) = Recorded By, (t) = Taken By, (c) = Cosigned By      Initials Name Provider Type     Alissa Gomes OT Occupational Therapist                     Mobility/ADL's       [facility] Name 01/11/24 0488          Bed Mobility    Bed Mobility supine-sit;sit-supine  -     Supine-Sit [place] (Bed Mobility) verbal cues;nonverbal cues (demo/gesture);moderate assist (50% patient effort)  -     Sit-Supine [place] (Bed Mobility) verbal cues;nonverbal cues (demo/gesture);moderate assist (50% patient effort)  -     Assistive Device (Bed Mobility) bed rails;head of bed elevated  -[facility] Name 01/11/24 5844          Sit-Stand Transfer    Sit-Stand [place] (Transfers) verbal cues;nonverbal cues (demo/gesture);moderate assist (50% patient effort)  -     Assistive Device (Sit-Stand Transfers) walker, front-wheeled  -       Row Name 01/11/24 7399          Functional  Mobility    Patient was able to Ambulate yes  -[facility] Name 01/11/24 1554          Activities of Daily Living    BADL Assessment/Intervention grooming  -[facility] Name 01/11/24 1554          Grooming Assessment/Training    [place] Level (Grooming) grooming skills;set up;hair care, combing/brushing  -     Position (Grooming) edge of bed sitting;unsupported sitting  -               User Key  (r) = Recorded By, (t) = Taken By, (c) = Cosigned By      Initials Name Provider Type     Alissa Gomes OT Occupational Therapist                   Obj/Interventions       [facility] Name 01/11/24 1554          Sensory Assessment (Somatosensory)    Sensory Assessment (Somatosensory) sensation intact  -LH       Row Name 01/11/24 1554          Vision Assessment/Intervention    Visual Impairment/Limitations WFL  -LH       Row Name 01/11/24 1554          Range of Motion Comprehensive    General Range of Motion bilateral upper extremity ROM WFL  -LH       Row Name 01/11/24 1554          Strength Comprehensive (MMT)    General Manual Muscle Testing (MMT) Assessment upper extremity strength deficits identified  -LH       Row Name 01/11/24 1554          Motor Skills    Motor Skills functional endurance  -     Functional Endurance Fair  -LH       Row Name 01/11/24 1554          Balance    Balance Assessment sitting static balance;sitting dynamic balance;sit to stand dynamic balance;standing static balance  -     Static Sitting Balance standby assist  -     Dynamic Sitting Balance standby assist  -     Position, Sitting Balance unsupported;sitting edge of bed  -     Sit to Stand Dynamic Balance minimal assist  -     Static Standing Balance contact guard;verbal cues;non-verbal cues (demo/gesture);minimal assist  -     Dynamic Standing Balance minimal assist  -     Position/Device Used, Standing Balance walker, front-wheeled  -     Balance Interventions sitting;standing;occupation based/functional task  -                User Key  (r) = Recorded By, (t) = Taken By, (c) = Cosigned By      Initials Name Provider Type     Alissa Gomes OT Occupational Therapist                   Goals/Plan    No documentation.                  Clinical Impression       Row Name 01/11/24 6655          Pain Assessment    Pretreatment Pain Rating 0/10 - no pain  -     Posttreatment Pain Rating 0/10 - no pain  -       Row Name 01/11/24 3323          Plan of Care Review    Plan of Care Reviewed With patient  -     Outcome Evaluation Patient seen today for OT/PT cotreatment, and tolerated therapeutic intervention well. Patient presents with somewhat delayed response, but able to follow cues and instruction for mobility strategies today. Patient completed bed mobility with mod A, performed UB dressing while seated at EOB, requires significant verbal cues and min A for [facility] gown. Patient performed sit to stand transfers with min A, standing at rwx with posterior lean. Patient performed functional mobility within hospital room with rwx, with assist x1, and 1 to assist with equipment management. Patient will continue to benefit from skilled OT to address remaining functional deficits, recommend SNF at acute [place].  -       Row Name 01/11/24 4989          Therapy Assessment/Plan (OT)    Rehab Potential (OT) good, to achieve stated therapy goals  -     Criteria for Skilled Therapeutic Interventions Met (OT) yes;meets criteria;skilled treatment is necessary  -     Therapy Frequency (OT) 5 times/wk  -       Row Name 01/11/24 0622          Therapy Plan Review/Discharge Plan (OT)    Anticipated Discharge Disposition (OT) skilled nursing facility  -               User Key  (r) = Recorded By, (t) = Taken By, (c) = Cosigned By      Initials Name Provider Type     Alissa Gomes OT Occupational Therapist                   Outcome Measures       Row Name 01/11/24 2520          How much help from another is currently needed...    Putting  on and taking off regular lower body clothing? 2  -LH     Bathing (including washing, rinsing, and drying) 2  -LH     Toileting (which includes using toilet bed pan or urinal) 2  -LH     Putting on and taking off regular upper body clothing 3  -LH     Taking care of personal grooming (such as brushing teeth) 3  -LH     Eating meals 3  -     AM-PAC 6 Clicks Score (OT) 15  -       Row Name 01/11/24 1400 01/11/24 0800       How much help from another person do you currently need...    Turning from your back to your side while in flat bed without using bedrails? 2  -LV 2  -LV    Moving from lying on back to sitting on the side of a flat bed without bedrails? 2  -LV 2  -LV    Moving to and from a bed to a chair (including a wheelchair)? 1  -LV 1  -LV    Standing up from a chair using your arms (e.g., wheelchair, bedside chair)? 1  -LV 1  -LV    Climbing 3-5 steps with a railing? 1  -LV 1  -LV    To walk in hospital room? 1  -LV 1  -LV    AM-PAC 6 Clicks Score (PT) 8  -LV 8  -LV    Highest Level of Mobility Goal 3 --> Sit at edge of bed  -LV 3 --> Sit at edge of bed  -LV      Row Name 01/11/24 1558          Modified Oren Scale    Modified Oren Scale 4 - Moderately severe disability.  Unable to walk without assistance, and unable to attend to own bodily needs without assistance.  -       Row Name 01/11/24 1558          Functional Assessment    Outcome Measure Options AM-PAC 6 Clicks Daily Activity (OT);Modified Oren  -               User Key  (r) = Recorded By, (t) = Taken By, (c) = Cosigned By      Initials Name Provider Type     Alissa Gomes OT Occupational Therapist     Francia Camarillo, RN Registered Nurse                    Occupational Therapy Education       Title: PT OT SLP Therapies (Done)       Topic: Occupational Therapy (Done)       Point: ADL training (Done)       Description:   Instruct learner(s) on proper safety adaptation and remediation techniques during self care or transfers.    Instruct in proper use of assistive devices.                  Learning Progress Summary             Patient Acceptance, E,TB, VU by  at 1/8/2024 1210    Comment: Instructed in role of OT, discharge planning, home safety, fall prevention, energy conservation and work simplification strategies                         Point: Home exercise program (Done)       Description:   Instruct learner(s) on appropriate technique for monitoring, assisting and/or progressing therapeutic exercises/activities.                  Learning Progress Summary             Patient Acceptance, E,TB, VU by  at 1/8/2024 1210    Comment: Instructed in role of OT, discharge planning, home safety, fall prevention, energy conservation and work simplification strategies                         Point: Precautions (Done)       Description:   Instruct learner(s) on prescribed precautions during self-care and functional transfers.                  Learning Progress Summary             Patient Acceptance, E,TB, VU by  at 1/8/2024 1210    Comment: Instructed in role of OT, discharge planning, home safety, fall prevention, energy conservation and work simplification strategies                         Point: Body mechanics (Done)       Description:   Instruct learner(s) on proper positioning and spine alignment during self-care, functional mobility activities and/or exercises.                  Learning Progress Summary             Patient Acceptance, E,TB, VU by  at 1/8/2024 1210    Comment: Instructed in role of OT, discharge planning, home safety, fall prevention, energy conservation and work simplification strategies                                         User Key       Initials Effective Dates Name Provider Type Discipline     08/31/23 -  Alissa Gomes, OT Occupational Therapist OT                  OT Recommendation and Plan  Planned Therapy Interventions (OT): activity tolerance training, BADL retraining, functional balance retraining,  occupation/activity based interventions, patient/caregiver education/training, strengthening exercise, transfer/mobility retraining  Therapy Frequency (OT): 5 times/wk  Plan of Care Review  Plan of Care Reviewed With: patient  Outcome Evaluation: Patient seen today for OT/PT cotreatment, and tolerated therapeutic intervention well. Patient presents with somewhat delayed response, but able to follow cues and instruction for mobility strategies today. Patient completed bed mobility with mod A, performed UB dressing while seated at EOB, requires significant verbal cues and min A for [facility] gown. Patient performed sit to stand transfers with min A, standing at rwx with posterior lean. Patient performed functional mobility within hospital room with rwx, with assist x1, and 1 to assist with equipment management. Patient will continue to benefit from skilled OT to address remaining functional deficits, recommend SNF at acute [place].     Time Calculation:   Evaluation Complexity (OT)  Review Occupational Profile/Medical/Therapy History Complexity: expanded/moderate complexity  Assessment, Occupational Performance/Identification of Deficit Complexity: 3-5 performance deficits  Clinical Decision Making Complexity (OT): detailed assessment/moderate complexity  Overall Complexity of Evaluation (OT): moderate complexity     Time Calculation- OT       Row Name 01/11/24 1559             Time Calculation- OT    OT Start Time 1412  -      OT Stop Time 1435  -      OT Time Calculation (min) 23 min  -      Total Timed Code Minutes- OT 23 minute(s)  -      OT Received On 01/11/24  -      OT - Next Appointment 01/12/24  -         Timed Charges    43154 - OT Therapeutic Activity Minutes 13  -LH      15654 - OT Self Care/Mgmt Minutes 10  -LH         Total Minutes    Timed Charges Total Minutes 23  -LH       Total Minutes 23  -LH                User Key  (r) = Recorded By, (t) = Taken By, (c) = Cosigned By      Initials  Name Provider Type     Alissa Gomes OT Occupational Therapist                  Therapy Charges for Today       Code Description Service Date Service Provider Modifiers Qty    [id number]  OT THERAPEUTIC ACT EA 15 MIN 1/11/2024 Alissa Gomes OT GO 1    [id number]  OT SELF CARE/MGMT/TRAIN EA 15 MIN 1/11/2024 Alissa Gomes OT GO 1                 Alissa Gomes OT  1/11/2024

## 2024-01-11 NOTE — PLAN OF CARE
Goal Outcome Evaluation:  Plan of Care Reviewed With: patient           Outcome Evaluation: Patient seen today for OT/PT cotreatment, and tolerated therapeutic intervention well. Patient presents with somewhat delayed response, but able to follow cues and instruction for mobility strategies today. Patient completed bed mobility with mod A, performed UB dressing while seated at EOB, requires significant verbal cues and min A for [facility] gown. Patient performed sit to stand transfers with min A, standing at rwx with posterior lean. Patient performed functional mobility within hospital room with rwx, with assist x1, and 1 to assist with equipment management. Patient will continue to benefit from skilled OT to address remaining functional deficits, recommend SNF at acute [place].      Anticipated Discharge Disposition (OT): skilled nursing facility

## 2024-01-11 NOTE — PLAN OF CARE
Goal Outcome Evaluation:  Plan of Care Reviewed With: patient        Progress: improving  Outcome Evaluation: Pt agreed to PT/OT session, pt improved w/bed mobility, tfers and amb, bassem amb ~15ft in total, with rest breaks to complete , constant cues for foot placement in rwx and sequencing, pt very unstaedy , L lean during amb, but followed directions well when he heard them, pt willl need SNU at [place] to maximize functional outcome      Anticipated Discharge Disposition (PT): skilled nursing facility

## 2024-01-11 NOTE — PROGRESS NOTES
Name: Madhu Santoro ADMIT: 2024   : 1945  PCP: Damian Sanchez MD    MRN: [id number] LOS: 2 days   AGE/SEX: 78 y.o. male  ROOM: [facility]/     Subjective   Subjective   Resting in bed.  Daughter at bedside.  No acute issues overnight.  Patient denies any chest pain, dyspnea, cough, fever or chills.  Denies any nausea, vomiting or abdominal pain.  His daughter is pleased as he sounds stronger in his voice today.  He continues to complain of bilateral lower extremity weakness, but no new pain or paresthesias.  MRI of his lumbar spine yesterday did show a small area of bulging disc with mild foraminal narrowing-awaiting neurosurgical evaluation.      Objective   Objective   Vital Signs  Temp:  [97.3 °F (36.3 °C)-98.4 °F (36.9 °C)] 97.9 °F (36.6 °C)  Heart Rate:  [79-88] 88  Resp:  [18] 18  BP: (140-155)/(54-71) 155/71  SpO2:  [94 %-95 %] 95 %  on   ;   Device (Oxygen Therapy): room air  Body mass index is 27.29 kg/m².    Physical Exam  Vitals and nursing note reviewed.   Constitutional:       Appearance: He is ill-appearing. He is not toxic-appearing.   Cardiovascular:      Rate and Rhythm: Normal rate and regular rhythm.      Pulses: Normal pulses.   Pulmonary:      Effort: Pulmonary effort is normal. No respiratory distress.   Abdominal:      General: Bowel sounds are normal. There is no distension.      Palpations: Abdomen is soft.      Tenderness: There is no abdominal tenderness.      Comments: PEG present   Musculoskeletal:         General: Swelling (Mild bilateral lower extremities) present. Normal range of motion.      Cervical back: Normal range of motion and neck supple.   Skin:     General: Skin is warm and dry.      Findings: No bruising.   Neurological:      General: No focal deficit present.      Mental Status: He is alert and oriented to person, place, and time.      Sensory: No sensory deficit.      Motor: Weakness of both lower extremities present.      Coordination: Coordination normal.    Psychiatric:         Mood and Affect: Mood normal.         Behavior: Behavior normal.     Results Review:       I reviewed the patient's new clinical results.  Results from last 7 days   Lab Units 01/11/24  0425 01/10/24  0446 01/09/24  0320 01/08/24  0429   WBC 10*3/mm3 10.96* 13.98* 13.18* 10.15   HEMOGLOBIN g/dL 11.1* 11.0* 11.3* 10.4*   PLATELETS 10*3/mm3 439 441 491* 455*     Results from last 7 days   Lab Units 01/11/24  0425 01/10/24  0446 01/09/24  1538 01/09/24 0320 01/08/24  0700   SODIUM mmol/L 137 140  --  139 139   POTASSIUM mmol/L 4.4 4.0 4.6 3.6 3.9   CHLORIDE mmol/L 103 106  --  106 105   CO2 mmol/L 25.0 22.4  --  22.2 22.7   BUN mg/dL 14 14  --  11 13   CREATININE mg/dL 0.53* 0.57*  --  0.50* 0.59*   GLUCOSE mg/dL 119* 122*  --  96 123*   Estimated Creatinine Clearance: 130.1 mL/min (A) (by C-G formula based on SCr of 0.53 mg/dL (L)).  Results from last 7 days   Lab Units 01/08/24 0700 01/07/24  0928   ALBUMIN g/dL 2.9* 3.8   BILIRUBIN mg/dL 0.3 0.3   ALK PHOS U/L 136* 153*   AST (SGOT) U/L 30 39   ALT (SGPT) U/L 17 17     Results from last 7 days   Lab Units 01/11/24  0425 01/10/24  0446 01/09/24  0320 01/08/24  0700 01/07/24  0928   CALCIUM mg/dL 8.9 8.7 8.6 8.8 9.7   ALBUMIN g/dL  --   --   --  2.9* 3.8   MAGNESIUM mg/dL  --   --   --   --  2.0     Results from last 7 days   Lab Units 01/10/24  0446 01/07/24  1227 01/07/24  0928   PROCALCITONIN ng/mL 0.13  --  0.13   LACTATE mmol/L  --  0.9  --      Glucose   Date/Time Value Ref Range Status   01/11/2024 0043 99 70 - 130 mg/dL Final   01/10/2024 1624 88 70 - 130 mg/dL Final   01/10/2024 1127 151 (H) 70 - 130 mg/dL Final   01/10/2024 0552 138 (H) 70 - 130 mg/dL Final   01/09/2024 2325 236 (H) 70 - 130 mg/dL Final   01/09/2024 1622 116 70 - 130 mg/dL Final   01/09/2024 1101 118 70 - 130 mg/dL Final       allopurinol, 300 mg, Oral, Daily  amitriptyline, 50 mg, Per G Tube, Nightly  amLODIPine, 5 mg, Per G Tube, Daily  apixaban, 2.5 mg, Per G  Tube, Q12H  cefTRIAXone, 1,000 mg, Intravenous, Q24H  [START ON 1/31/2024] cyanocobalamin, 1,000 mcg, Intramuscular, Q30 Days  HYDROcodone-acetaminophen, 1 tablet, Per G Tube, TID  lansoprazole, 30 mg, Per G Tube, BID AC  Menthol-Zinc Oxide, 1 application , Topical, Q12H  rosuvastatin, 20 mg, Per G Tube, Nightly  sodium chloride, 10 mL, Intravenous, Q12H  sucralfate, 1 g, Per G Tube, BID AC       Diet: Regular/House Diet; No Mixed Consistencies; Texture: Soft to Chew (NDD 3); Soft to Chew: Ground Meat; Fluid Consistency: Nectar Thick       Assessment/Plan     Active Hospital Problems    Diagnosis  POA    **Orthostatic dizziness [R42]  Yes    Orthostatic hypotension [I95.1]  Yes    Bilateral leg weakness [R29.898]  Yes    Oral phase dysphagia [R13.11]  Yes    History of CVA (cerebrovascular accident) [Z86.73]  Not Applicable    S/P percutaneous endoscopic gastrostomy (PEG) tube placement [Z93.1]  Not Applicable    Polycythemia vera [D45]  Yes    Iron deficiency anemia [D50.9]  Yes    Chronic anticoagulation [Z79.01]  Not Applicable    S/P ablation of atrial flutter [Z98.890, Z86.79]  Not Applicable    Typical atrial flutter [I48.3]  Yes    Gastroesophageal reflux disease [K21.9]  Yes    DDD (degenerative disc disease), lumbosacral [M51.37]  Yes    Benign essential hypertension [I10]  Yes    Coronary artery disease due to lipid rich plaque [I25.10, I25.83]  Yes      Resolved Hospital Problems   No resolved problems to display.     Mr. Santoro is a 78 y.o. male that presented to the hospital for weakness and falls.  He was recently admitted to this facility at the end of November when he was found to have weakness and worsening anemia.  He was found to have a GI bleed most likely related to recent PEG tube placement and related anticoagulation with Eliquis.  He was maintained on a lower dose of Eliquis at that point as family did not want to pursue endoscopy.  He was noted to have a decubitus ulcer as well as dysphagia.   He was maintained on bolus feeds and discharged to SNF for strengthening.  He was to follow-up with Dr. Daly with hematology given his history of polycythemia.  He was apparently discharged from SNF 1/5 and returned home with family.  He continued to be weak and having issues ambulating with a walker.  His legs gave out on him and he suffered a mechanical fall hitting the left side of his head.  He was noted to have some orthostatic hypotension and admitted for further evaluation.     Orthostatic dizziness  Mechanical fall  -Given IV hydration with improvement. IV stopped.  -Sounds like he has some chronicity to dizziness with position changes.  Recommend safety precautions.  -May benefit from compression hose.  -CT head stable from prior with no acute findings or traumatic etiology.  -PT/OT following-looks like SNF recommended and facility obtained.     Dysphagia  PEG tube in place  History of CVA  -Chest x-ray on admission negative for acute findings.  -Neurologically intact.  Will monitor.  -Continue low-dose Eliquis and statin.  Aspirin stopped last admission due to anemia and possible PEG bleeding.  -Continue tube feedings from prior- rehab facility requesting adjustment of feeds to Osmolite 1.5 which is equivalent to what he is getting here per dietitian.  -Aspiration precautions  -VFSS 1/9 per ST with recommendations of soft to chew/NTL. Meds whole or crushed with NTL, upright for PO and for 30-45 minutes post meal, alternate liquid/solids.  -Will need ongoing ST evaluation at discharge.     Iron-deficiency anemia  Polycythemia vera  Thrombocytosis  -With recent GI bleeding.  Continue PPI Carafate.  -Hemoglobin much improved from prior.  Will trend.  -Continue outpatient follow-up with Dr. Daly with hematology.  Hematology evaluated here per family request-no new recommendations.  He is to remain off hydroxyurea at this time.  -Platelets mildly elevated this admission. ? Reactive-> normalized 1/10      CAD  Hypertension  History of atrial fibrillation with ablation  -Followed by [facility] in the past.  -Continue low-dose Eliquis and statin.  Continue Norvasc.  -Monitor for any cardiac complaints.  -Monitor on telemetry     Bilateral lower extremity weakness  -X-ray lumbar spine with mild retrolisthesis of L1 on L2, L2 and L3.  Vertebral body heights stable with no acute fractures.  Does have multilevel DDD.  -Suspect this is primarily muscular weakness/deconditioning. TSH/folate/vitamin B12 okay.  -Venous dopplers negative.  -MRI lumbar spine obtained given ongoing complaints.  That showed bulging disc material at L3-L4 and L4-L5 resulting in mild foraminal narrowing with no other significant canal narrowing noted.  Neurosurgery was consulted for this reason and now plans to obtain MRI thoracic and cervical spine to rule out any myelopathy to explain his symptoms.  They do not feel that the MRI of the lumbar spine is to explain anything.     UTI  -Developed worsening leukocytosis with dysuria on 1/10.  -UA with 3-5 WBC and 2+ bacteria-while there is no significant pyuria, patient with symptoms and so we will treat with a 3-day course.   -Currently on IV Rocephin and WBCs trending down.     Discussed with patient, daughter, CCP and Dr. Chan.     Will need MRI of the cervical and thoracic spine to be obtained and negative in order to plan for discharge tomorrow.     VTE Prophylaxis - Eliquis (home med)  Code Status - NO CPR/limited per admitting provider.  Disposition - Anticipate discharge next 1-2 days-see above.    MY Mondragon  [facility]  01/11/24  09:44 EST

## 2024-01-12 VITALS
HEART RATE: 82 BPM | TEMPERATURE: 97.5 F | HEIGHT: 68 IN | RESPIRATION RATE: 18 BRPM | DIASTOLIC BLOOD PRESSURE: 58 MMHG | OXYGEN SATURATION: 93 % | WEIGHT: 179.45 LBS | BODY MASS INDEX: 27.2 KG/M2 | SYSTOLIC BLOOD PRESSURE: 127 MMHG

## 2024-01-12 LAB
ANION GAP SERPL CALCULATED.3IONS-SCNC: 9.8 MMOL/L (ref 5–15)
BACTERIA SPEC AEROBE CULT: NORMAL
BACTERIA SPEC AEROBE CULT: NORMAL
BUN SERPL-MCNC: 15 MG/DL (ref 8–23)
BUN/CREAT SERPL: 28.8 (ref 7–25)
CALCIUM SPEC-SCNC: 9 MG/DL (ref 8.6–10.5)
CHLORIDE SERPL-SCNC: 104 MMOL/L (ref 98–107)
CO2 SERPL-SCNC: 25.2 MMOL/L (ref 22–29)
CREAT SERPL-MCNC: 0.52 MG/DL (ref 0.76–1.27)
DEPRECATED RDW RBC AUTO: 51.1 FL (ref 37–54)
EGFRCR SERPLBLD CKD-EPI 2021: 102.5 ML/MIN/1.73
ERYTHROCYTE [DISTWIDTH] IN BLOOD BY AUTOMATED COUNT: 15.7 % (ref 12.3–15.4)
GLUCOSE BLDC GLUCOMTR-MCNC: 129 MG/DL (ref 70–130)
GLUCOSE SERPL-MCNC: 132 MG/DL (ref 65–99)
HCT VFR BLD AUTO: 35.3 % (ref 37.5–51)
HGB BLD-MCNC: 10.8 G/DL (ref 13–17.7)
MCH RBC QN AUTO: 27.7 PG (ref 26.6–33)
MCHC RBC AUTO-ENTMCNC: 30.6 G/DL (ref 31.5–35.7)
MCV RBC AUTO: 90.5 FL (ref 79–97)
PLATELET # BLD AUTO: 480 10*3/MM3 (ref 140–450)
PMV BLD AUTO: 10.3 FL (ref 6–12)
POTASSIUM SERPL-SCNC: 4 MMOL/L (ref 3.5–5.2)
RBC # BLD AUTO: 3.9 10*6/MM3 (ref 4.14–5.8)
SODIUM SERPL-SCNC: 139 MMOL/L (ref 136–145)
WBC NRBC COR # BLD AUTO: 8.88 10*3/MM3 (ref 3.4–10.8)

## 2024-01-12 PROCEDURE — 85027 COMPLETE CBC AUTOMATED: CPT | Performed by: NURSE PRACTITIONER

## 2024-01-12 PROCEDURE — 99231 SBSQ HOSP IP/OBS SF/LOW 25: CPT | Performed by: NURSE PRACTITIONER

## 2024-01-12 PROCEDURE — 80048 BASIC METABOLIC PNL TOTAL CA: CPT | Performed by: NURSE PRACTITIONER

## 2024-01-12 PROCEDURE — 82948 REAGENT STRIP/BLOOD GLUCOSE: CPT

## 2024-01-12 RX ORDER — CEFDINIR 300 MG/1
300 CAPSULE ORAL 2 TIMES DAILY
Qty: 2 CAPSULE | Refills: 0 | Status: SHIPPED | OUTPATIENT
Start: 2024-01-12 | End: 2024-01-13

## 2024-01-12 RX ORDER — HYDROCODONE BITARTRATE AND ACETAMINOPHEN 7.5; 325 MG/1; MG/1
1 TABLET ORAL 4 TIMES DAILY PRN
Qty: 12 TABLET | Refills: 0 | Status: SHIPPED | OUTPATIENT
Start: 2024-01-12

## 2024-01-12 RX ADMIN — AMLODIPINE BESYLATE 5 MG: 5 TABLET ORAL at 09:21

## 2024-01-12 RX ADMIN — HYDROCODONE BITARTRATE AND ACETAMINOPHEN 1 TABLET: 7.5; 325 TABLET ORAL at 09:21

## 2024-01-12 RX ADMIN — LANSOPRAZOLE 30 MG: 15 TABLET, ORALLY DISINTEGRATING ORAL at 09:20

## 2024-01-12 RX ADMIN — ALLOPURINOL 300 MG: 300 TABLET ORAL at 09:21

## 2024-01-12 RX ADMIN — SUCRALFATE 1 G: 1 TABLET ORAL at 09:20

## 2024-01-12 RX ADMIN — APIXABAN 2.5 MG: 2.5 TABLET, FILM COATED ORAL at 09:21

## 2024-01-12 NOTE — PROGRESS NOTES
[facility] THORACIC/LUMBAR NEUROSURGERY PROGRESS NOTE      CC:BLE weakness      Subjective     Interval History:  had MRI C/T spine.  Anticipating discharge to rehab today.  No new issues    ROS:  MS: chronic back pain  Neuro: weakness  : chronic urgency, urge incontinence    Objective     Vital signs in last 24 hours:  Temp:  [97.3 °F (36.3 °C)-97.5 °F (36.4 °C)] 97.5 °F (36.4 °C)  Heart Rate:  [75-89] 82  Resp:  [18] 18  BP: (127-149)/(55-69) 127/58    Intake/Output this shift:  No intake/output data recorded.    LABS:  Results from last 7 days   Lab Units 01/12/24  0454 01/11/24  0425 01/10/24  0446   WBC 10*3/mm3 8.88 10.96* 13.98*   HEMOGLOBIN g/dL 10.8* 11.1* 11.0*   HEMATOCRIT % 35.3* 35.2* 34.7*   PLATELETS 10*3/mm3 480* 439 441     Results from last 7 days   Lab Units 01/12/24  0454 01/11/24  0425 01/10/24  0446 01/09/24  0320 01/08/24  0700 01/07/24  0928   SODIUM mmol/L 139 137 140   < > 139 139   POTASSIUM mmol/L 4.0 4.4 4.0   < > 3.9 4.3   CHLORIDE mmol/L 104 103 106   < > 105 101   CO2 mmol/L 25.2 25.0 22.4   < > 22.7 27.0   BUN mg/dL 15 14 14   < > 13 11   CREATININE mg/dL 0.52* 0.53* 0.57*   < > 0.59* 0.66*   CALCIUM mg/dL 9.0 8.9 8.7   < > 8.8 9.7   BILIRUBIN mg/dL  --   --   --   --  0.3 0.3   ALK PHOS U/L  --   --   --   --  136* 153*   ALT (SGPT) U/L  --   --   --   --  17 17   AST (SGOT) U/L  --   --   --   --  30 39   GLUCOSE mg/dL 132* 119* 122*   < > 123* 164*    < > = values in this interval not displayed.     Blood culture 1/7-NGTD.    IMAGING STUDIES:  MRI cervical spine reveals mild degenerative changes but no significant foraminal or canal stenosis at any level.  No cord signal changes.    MRI thoracic spine with no acute abnormality.  There is some prominence of epidural fat T3-T10 but no significant canal narrowing.    I personally viewed and interpreted the patient's MRI cervical and thoracic spine.  Also reviewed by Dr. Kennedy.    Meds reviewed/changed: Yes    Current  Facility-Administered Medications:     acetaminophen (TYLENOL) tablet 650 mg, 650 mg, Oral, Q4H PRN **OR** acetaminophen (TYLENOL) 160 MG/5ML oral solution 650 mg, 650 mg, Oral, Q4H PRN **OR** acetaminophen (TYLENOL) suppository 650 mg, 650 mg, Rectal, Q4H PRN, Salima Rogers MD    acetaminophen (TYLENOL) tablet 650 mg, 650 mg, Per G Tube, Q6H PRN, Salima Rogers MD    allopurinol (ZYLOPRIM) tablet 300 mg, 300 mg, Oral, Daily, April Sheridan APRN, 300 mg at 01/11/24 0803    amitriptyline (ELAVIL) tablet 50 mg, 50 mg, Per G Tube, Nightly, Salima Rogers MD, 50 mg at 01/11/24 2259    amLODIPine (NORVASC) tablet 5 mg, 5 mg, Per G Tube, Daily, Salima Rogers MD, 5 mg at 01/11/24 0803    apixaban (ELIQUIS) tablet 2.5 mg, 2.5 mg, Per G Tube, Q12H, Christian Chan DO, 2.5 mg at 01/11/24 2258    Calcium Replacement - Follow Nurse / BPA Driven Protocol, , Does not apply, PRN, Salima Rogers MD    cefTRIAXone (ROCEPHIN) 1,000 mg in sodium chloride 0.9 % 100 mL IVPB-VTB, 1,000 mg, Intravenous, Q24H, April Sheridan APRN, Last Rate: 200 mL/hr at 01/11/24 1858, 1,000 mg at 01/11/24 1858    [START ON 1/31/2024] cyanocobalamin injection 1,000 mcg, 1,000 mcg, Intramuscular, Q30 Days, Salima Rogers MD    guaifenesin (ROBITUSSIN) 100 MG/5ML liquid 200 mg, 200 mg, Oral, Q4H PRN, Salima Rogers MD    HYDROcodone-acetaminophen (NORCO) 7.5-325 MG per tablet 1 tablet, 1 tablet, Per G Tube, TID, Christian Chan DO, 1 tablet at 01/11/24 2259    ipratropium-albuterol (DUO-NEB) nebulizer solution 3 mL, 3 mL, Nebulization, Q4H PRN, Salima Rogers MD    lansoprazole (PREVACID SOLUTAB) disintegrating tablet Tablet Delayed Release Dispersible 30 mg, 30 mg, Per G Tube, BID AC, Salima Rogers MD, 30 mg at 01/11/24 1900    Magnesium Standard Dose Replacement - Follow Nurse / BPA Driven Protocol, , Does not apply, PRN, Salima Rogers MD    Menthol-Zinc Oxide 1 application , 1 application , Topical, Q12H, Salima Rogers MD, 1 application  at 01/11/24 2301     nitroglycerin (NITROSTAT) SL tablet 0.4 mg, 0.4 mg, Sublingual, Q5 Min PRN, Salima Rogers MD    ondansetron (ZOFRAN) injection 4 mg, 4 mg, Intravenous, Q6H PRN, Salima Rogers MD    Phosphorus Replacement - Follow Nurse / BPA Driven Protocol, , Does not apply, PRN, Salima Rogers MD    Potassium Replacement - Follow Nurse / BPA Driven Protocol, , Does not apply, PRN, Salima Rogers MD    rosuvastatin (CRESTOR) tablet 20 mg, 20 mg, Per G Tube, Nightly, Salima Rogers MD, 20 mg at 01/11/24 2259    sennosides-docusate (PERICOLACE) 8.6-50 MG per tablet 2 tablet, 2 tablet, Per G Tube, BID PRN, Salima Rogers MD, 2 tablet at 01/09/24 2155    sodium chloride 0.9 % flush 10 mL, 10 mL, Intravenous, Q12H, Salima Rogers MD, 10 mL at 01/11/24 2300    sodium chloride 0.9 % flush 10 mL, 10 mL, Intravenous, PRN, Salima Rogers MD    sodium chloride 0.9 % infusion 40 mL, 40 mL, Intravenous, PRN, Salima Rogers MD    sucralfate (CARAFATE) tablet 1 g, 1 g, Per G Tube, BID AC, Salima Rogers MD, 1 g at 01/11/24 1859      Physical Exam:    General:   Awake, alert.  Sitting up in bed eating breakfast.  Pleasant and cooperative.  Motor:    Moves all extremities with generalized weakness  Station and Gait:             Per PT note 1/11- pt improved w/bed mobility, tfers and amb, bassem amb ~15ft in total, with rest breaks to complete , constant cues for foot placement in rwx and sequencing, pt very unstaedy , L lean during amb,    Extremities:   Wearing SCD      Assessment & Plan     ASSESSMENT:      Orthostatic dizziness    Coronary artery disease due to lipid rich plaque    Benign essential hypertension    DDD (degenerative disc disease), lumbosacral    Gastroesophageal reflux disease    S/P ablation of atrial flutter    Typical atrial flutter    Chronic anticoagulation    Iron deficiency anemia    Polycythemia vera    History of CVA (cerebrovascular accident)    S/P percutaneous endoscopic gastrostomy (PEG) tube placement    Oral phase dysphagia    " Orthostatic hypotension    Bilateral leg weakness    Patient with chronic back pain and reported weakness and heaviness in bilateral lower extremities.  Had lumbar MRI that was unremarkable.  Due to his complaints and imbalance issues, cervical and thoracic MRIs were completed yesterday and are unremarkable for any cord compression, cord signal changes, or source of potential myelopathy.  Patient needs ongoing rehabilitation for strengthening.  Nothing further to offer from neurosurgery standpoint.  Cleared for discharge with no routine neurosurgical follow-up.    PLAN:   No JANET follow up    I discussed the patient's findings and my recommendations with patient, primary care team, and Dr. Kennedy    During patient visit, I utilized appropriate personal protective equipment including gloves. Appropriate PPE was worn during the entire visit.  Hand hygiene was completed before and after.      LOS: 3 days       Joanne Guevara, APRN  1/12/2024  08:25 EST    \"Dictated utilizing Dragon dictation\".      "

## 2024-01-12 NOTE — PLAN OF CARE
Goal Outcome Evaluation:  Plan of Care Reviewed With: patient           Outcome Evaluation: Pt vital signs are stable, oriented x4, room air. Antibiotics are continued. MRI of cervical and thoracic spine still completed. Tommorrow possible discharge, pending results.

## 2024-01-12 NOTE — CASE MANAGEMENT/SOCIAL WORK
Continued Stay Note  [facility]     Patient Name: Madhu Santoro  MRN: [id number]  Today's Date: 1/12/2024    Admit Date: 1/7/2024    Plan: [facility] SNF via ems at 10am today   Discharge Plan       Row Name 01/12/24 0929       Plan    Plan [facility] SNF via ems at 10am today    Patient/Family in Agreement with Plan yes    Plan Comments DC orders noted, dc summary faxed and packet in [email or web address]. Updated SHANIKA pabon RN/CCP                   Discharge Codes    No documentation.                 Expected Discharge Date and Time       Expected Discharge Date Expected Discharge Time    Jan 12, 2024               Monica Russell RN

## 2024-01-12 NOTE — PLAN OF CARE
Goal Outcome Evaluation:   Pt alert and oriented. Patient to be discharged to [facility].

## 2024-01-12 NOTE — DISCHARGE SUMMARY
[facility]    Date of Admission: 1/7/2024  Date of Discharge:  1/12/2024    PCP: Damian Sanchez MD    Discharge Diagnosis:   Active Hospital Problems    Diagnosis  POA    **Orthostatic dizziness [R42]  Yes    Orthostatic hypotension [I95.1]  Yes    Bilateral leg weakness [R29.898]  Yes    Oral phase dysphagia [R13.11]  Yes    History of CVA (cerebrovascular accident) [Z86.73]  Not Applicable    S/P percutaneous endoscopic gastrostomy (PEG) tube placement [Z93.1]  Not Applicable    Polycythemia vera [D45]  Yes    Iron deficiency anemia [D50.9]  Yes    Chronic anticoagulation [Z79.01]  Not Applicable    S/P ablation of atrial flutter [Z98.890, Z86.79]  Not Applicable    Typical atrial flutter [I48.3]  Yes    Gastroesophageal reflux disease [K21.9]  Yes    DDD (degenerative disc disease), lumbosacral [M51.37]  Yes    Benign essential hypertension [I10]  Yes    Coronary artery disease due to lipid rich plaque [I25.10, I25.83]  Yes      Resolved Hospital Problems   No resolved problems to display.     Procedures Performed  none     Consults       Date and Time Order Name Status Description    1/10/2024  4:50 PM Inpatient Neurosurgery Consult Completed     1/10/2024 10:46 AM Hematology & Oncology Inpatient Consult Completed     1/7/2024  2:12 PM [facility] (on-call MD unless specified) Details            Hospital Course  Please see history and physical for details. Patient is a 78 y.o. male that presented to the hospital for weakness and falls.  He was recently admitted to this facility at the end of November when he was found to have weakness and worsening anemia.  He was found to have a GI bleed most likely related to recent PEG tube placement and related anticoagulation with Eliquis.  He was maintained on a lower dose of Eliquis at that point as family did not want to pursue endoscopy.  He was noted to have a decubitus ulcer as well as dysphagia.  He was maintained on  bolus feeds and discharged to SNF for strengthening.  He was to follow-up with Dr. Daly with hematology given his history of polycythemia.  He was apparently discharged from SNF 1/5 and returned home with family.  He continued to be weak and having issues ambulating with a walker.  His legs gave out on him and he suffered a mechanical fall hitting the left side of his head.  He was noted to have some orthostatic hypotension and admitted for further evaluation.    He was given IV fluid hydration with improvement in his orthostasis.  This is somewhat of a chronic issue.  He may benefit from compression hose in the long-term.  CT head was stable from prior with no acute findings or traumatic etiology.  PT/OT followed and recommended SNF.   He does have known dysphagia with PEG tube in place in the setting of prior CVA.  Chest x-ray this admission was negative for acute findings.  He did have an episode of elevated white count in the setting of drinking thin liquids and repeat chest x-ray was obtained and negative.  Was maintained on the low-dose Eliquis and statin as his aspirin stopped last admission due to his recent bleeding.  He was maintained on tube feedings per speech therapy with aspiration precautions.  VFSS was performed 1/9 and he was recommended for soft to chew food with nectar thick liquids.  He will need meds whole or crushed with nectar thick liquids and upright for p.o. and for 30 to 45 minutes postmeal.  He will need alternate liquids/solids and needs to continue to be followed by speech therapy at discharge.   Given his increasing leukocytosis, he did report some dysuria on 1/10.  He was found to have a possible UTI and treated with IV Rocephin x 2.  He will be given 1 more day of oral antibiotics to complete a 3-day course for UTI.  His white count has now normalized.   He does have known iron deficiency anemia and polycythemia vera.  Family requested hematology evaluation and they felt his  numbers look stable and recommended that he continue on his low-dose Eliquis and remain off hydroxyurea at this time.  He can follow-up with them in the next couple weeks.   He did complain of bilateral lower extremity weakness and family was concerned of spinal etiology for this.  X-ray of his lumbar spine showed mild retrolisthesis of L1 on L2, L2 and L3.  Vertebral heights were stable with no acute fractures.  TSH/folate/vitamin B12 are all okay and venous Dopplers negative.  MRI of the lumbar spine was further obtained given ongoing complaints and showed disc bulging material L3-L4 and L4-L5 resulting in mild foraminal narrowing with no other significant canal narrowing noted.  Neurosurgery was consulted and obtained MRI of the cervical and thoracic spine that were also negative for any concerns of myelopathy, but showed chronic degenerative changes.  They have signed off.  Likely his neuropathy and muscle weakness are to explain his lower extremity weakness and he will need ongoing rehabilitation at rehab.   On the day of discharge, he denies any chest pain, dyspnea, cough, fever or chills.  Denies any nausea, vomiting or abdominal pain.  He is medically stable for discharge to rehab at this time.  He will need ongoing PT/OT for strengthening and likely some home health thereafter to maintain his mobility.    I discussed the patient's findings and my recommendations with patient, family, nursing staff, and Dr. Chan .    Condition on Discharge: Improved.     Temp:  [97.3 °F (36.3 °C)-97.5 °F (36.4 °C)] 97.5 °F (36.4 °C)  Heart Rate:  [75-89] 82  Resp:  [18] 18  BP: (127-149)/(55-69) 127/58  Body mass index is 27.29 kg/m².    Physical Exam  Vitals and nursing note reviewed.   Constitutional:       Appearance: He is chronically ill-appearing. He is not toxic-appearing.   Cardiovascular:      Rate and Rhythm: Normal rate and regular rhythm.      Pulses: Normal pulses.   Pulmonary:      Effort: Pulmonary effort is  normal. No respiratory distress.   Abdominal:      General: Bowel sounds are normal. There is no distension.      Palpations: Abdomen is soft.      Tenderness: There is no abdominal tenderness.      Comments: PEG present   Musculoskeletal:         General: Swelling (Mild bilateral lower extremities) present. Normal range of motion.      Cervical back: Normal range of motion and neck supple.   Skin:     General: Skin is warm and dry.      Findings: No bruising.   Neurological:      General: No focal deficit present.      Mental Status: He is alert and oriented to person, place, and time.      Sensory: No sensory deficit.      Motor: Weakness of both lower extremities present.      Coordination: Coordination normal.   Psychiatric:         Mood and Affect: Mood normal.         Behavior: Behavior normal.        Discharge Medications        New Medications        Instructions Start Date   cefdinir 300 MG capsule  Commonly known as: OMNICEF   300 mg, Oral, 2 Times Daily             Continue These Medications        Instructions Start Date   acetaminophen 325 MG tablet  Commonly known as: TYLENOL   650 mg, Per G Tube, Every 6 Hours PRN      allopurinol 300 MG tablet  Commonly known as: ZYLOPRIM   300 mg, Oral, Daily      amitriptyline 50 MG tablet  Commonly known as: ELAVIL   50 mg, Per G Tube, Nightly      amLODIPine 5 MG tablet  Commonly known as: NORVASC   5 mg, Per G Tube, Daily      apixaban 2.5 MG tablet tablet  Commonly known as: ELIQUIS   2.5 mg, Oral, Every 12 Hours Scheduled      guaifenesin 100 MG/5ML liquid  Commonly known as: ROBITUSSIN   200 mg, Oral, Every 4 Hours PRN      HYDROcodone-acetaminophen 7.5-325 MG per tablet  Commonly known as: Norco   1 tablet, Oral, 4 Times Daily PRN, Hold if sedation      ipratropium-albuterol 0.5-2.5 mg/3 ml nebulizer  Commonly known as: DUO-NEB   3 mL, Nebulization, Every 4 Hours PRN      lansoprazole 30 MG Tablet Delayed Release Dispersible disintegrating tablet  Commonly  known as: PREVACID SOLUTAB   30 mg, Per G Tube, 2 Times Daily Before Meals      Menthol-Zinc Oxide 0.44-20.6 % ointment   1 application , Topical, Every 12 Hours Scheduled      rosuvastatin 20 MG tablet  Commonly known as: Crestor   20 mg, Per G Tube, Nightly      sennosides-docusate 8.6-50 MG per tablet  Commonly known as: PERICOLACE   2 tablets, Per G Tube, 2 Times Daily PRN      sucralfate 1 g tablet  Commonly known as: CARAFATE   1 g, Per G Tube, 2 Times Daily Before Meals              Diet Instructions       Diet: Regular/House Diet, Tube Feeding; Continuous; Isosource 1.5 (ok to substitute Osmolite at rehab) at 60 cc/hr with 30 cc water flush every 4 hours; Soft to Chew (NDD 3); Ground Meat; [place] Thick      Discharge Diet:  Regular/House Diet  Tube Feeding       Feeding Type: Continuous    Formula & Rate: Isosource 1.5 (ok to substitute Osmolite at rehab) at 60 cc/hr with 30 cc water flush every 4 hours    Texture: Soft to Chew (NDD 3)    Soft to Chew: Ground Meat    Fluid Consistency: Nectar Thick           Activity Instructions       Activity as Tolerated             Additional Instructions for the Follow-ups that You Need to Schedule       Discharge Follow-up with PCP   As directed       Currently Documented PCP:    Damian Sanchez MD    PCP Phone Number:    [phone]     Follow Up Details: see in 1-2 weeks        Discharge Follow-up with Specified Provider: Dr. Addy Rodarte; 2 Weeks   As directed      To: HematologyDr. Daly   Follow Up: 2 Weeks        Discharge Follow-up with Specified Provider: Neurosurgery   As directed      To: Neurosurgery   Follow Up Details: as needed               Contact information for follow-up providers       Damian Sanchez MD .    Specialty: Family Medicine  Why: see in 1-2 weeks  Contact information:  [address] [address]  [phone]                       Contact information for after-discharge care       Destination        [facility] .    Service: Skilled Nursing  Contact information:  [address] [facility] [address] [phone]                                 Test Results Pending at Discharge  Pending Labs       Order Current Status    Blood Culture - Blood, Arm, Right Preliminary result    Blood Culture - Blood, Arm, Right Preliminary result           April Sheridan, APRN  01/12/24  08:48 EST    Discharge time spent greater than 30 minutes.

## 2024-01-12 NOTE — CASE MANAGEMENT/SOCIAL WORK
Case Management Discharge Note      Final Note: [facility] SNF via [facility] ems. Danelle rn/ccp    Provided Post Acute Provider List?: N/A  Provided Post Acute Provider Quality & Resource List?: N/A    Selected Continued Care - Discharged on 1/12/2024 Admission date: 1/7/2024 - Discharge disposition: Skilled Nursing Facility (DC - External)      Destination Coordination complete.      Service Provider Selected Services Address Phone Fax Patient Preferred    [facility] [address] [address] [phone] --              Durable Medical Equipment    No services have been selected for the patient.                Dialysis/Infusion    No services have been selected for the patient.                Home Medical Care    No services have been selected for the patient.                Therapy    No services have been selected for the patient.                Community Resources    No services have been selected for the patient.                Community & DME    No services have been selected for the patient.                    Selected Continued Care - Episodes Includes continued care and service providers with selected services from the active episodes listed below      Oncology- External Fill Episode start date: 5/16/2023 (Paused)   There are no active outsourced providers for this episode.                 Selected Continued Care - Prior Encounters Includes continued care and service providers with selected services from prior encounters from 10/9/2023 to 1/12/2024      Discharged on 11/29/2023 Admission date: 11/22/2023 - Discharge disposition: Skilled Nursing Facility (DC - External)      Destination       Service Provider Selected Services Address Phone Fax Patient Preferred    [facility] [address] [address] [phone] --                      Discharged on 11/9/2023 Admission date:  10/23/2023 - Discharge disposition: Skilled Nursing Facility (DC - External)      Destination       Service Provider Selected Services Address Phone Fax Patient Preferred    [facility] [address] [phone] --                          Transportation Services  Ambulance: [facility]    Final Discharge Disposition Code: 03 - skilled nursing facility (SNF)

## 2024-01-29 RX ORDER — AMLODIPINE BESYLATE 2.5 MG/1
2.5 TABLET ORAL DAILY
Qty: 90 TABLET | Refills: 3 | OUTPATIENT
Start: 2024-01-29

## 2024-01-30 RX ORDER — ALLOPURINOL 300 MG/1
300 TABLET ORAL DAILY
Qty: 90 TABLET | OUTPATIENT
Start: 2024-01-30

## 2024-01-31 ENCOUNTER — TELEPHONE (OUTPATIENT)
Dept: FAMILY MEDICINE CLINIC | Facility: CLINIC | Age: 79
End: 2024-01-31
Payer: MEDICARE

## 2024-01-31 NOTE — TELEPHONE ENCOUNTER
Eryn Guadalupe from [facility] health home care called to get orders for home health nursing, pt, ot, speech therapy and permision to accept orders from Dr Bernard his oncologist.  Her phone number is [phone].

## 2024-02-01 ENCOUNTER — READMISSION MANAGEMENT (OUTPATIENT)
Dept: CALL CENTER | Facility: HOSPITAL | Age: 79
End: 2024-02-01
Payer: MEDICARE

## 2024-02-01 ENCOUNTER — TRANSCRIBE ORDERS (OUTPATIENT)
Dept: HOME HEALTH SERVICES | Facility: HOME HEALTHCARE | Age: 79
End: 2024-02-01
Payer: MEDICARE

## 2024-02-01 ENCOUNTER — DOCUMENTATION (OUTPATIENT)
Dept: HOME HEALTH SERVICES | Facility: HOME HEALTHCARE | Age: 79
End: 2024-02-01
Payer: MEDICARE

## 2024-02-01 ENCOUNTER — TELEPHONE (OUTPATIENT)
Dept: ONCOLOGY | Facility: CLINIC | Age: 79
End: 2024-02-01
Payer: MEDICARE

## 2024-02-01 ENCOUNTER — HOME HEALTH ADMISSION (OUTPATIENT)
Dept: HOME HEALTH SERVICES | Facility: HOME HEALTHCARE | Age: 79
End: 2024-02-01
Payer: MEDICARE

## 2024-02-01 DIAGNOSIS — Z43.1 ATTENTION TO GASTROSTOMY: Primary | ICD-10-CM

## 2024-02-01 NOTE — TELEPHONE ENCOUNTER
Please Follow If Calling for Orders  Caller: Eryn  Department/Office: [facility]  Best call back number: [phone]  Fax Number:   What orders are you requesting (i.e. lab or imaging): orders for labs  In what timeframe would you need the order: Today

## 2024-02-01 NOTE — TELEPHONE ENCOUNTER
Returned call to Eryn with [facility]. Per Dr. Lopez, he does not need any labs at this time. Informed Eryn and she v/u.

## 2024-02-01 NOTE — TELEPHONE ENCOUNTER
o get orders for home health nursing, pt, ot, speech therapy and permision to accept orders from Dr Bernard his oncologist.  Her phone number is [phone].    scarlet advised ok per dr giron

## 2024-02-01 NOTE — TELEPHONE ENCOUNTER
Left message ok for verbal ok per dr giron  home health advised -  Eryn Guadalupe from [facility] home care called to get orders for home health nursing, pt, ot, speech therapy and permision to accept orders from Dr Bernard his oncologist.  Her phone number is [phone].

## 2024-02-01 NOTE — TELEPHONE ENCOUNTER
Home Health called back and stated the message she received did not have nursing or an ok to get orders from Dr Bernard.

## 2024-02-01 NOTE — OUTREACH NOTE
Prep Survey      Flowsheet Row Responses   [facility]? Non-[facility]   Is LACE score < 7 ? Non-BH Discharge   Eligibility [facility]   Date of Discharge 02/01/24   Discharge diagnosis Orthostatic hypotension   Does the patient have one of the following disease processes/diagnoses(primary or secondary)? Other   Prep survey completed? Yes            JANICE NUNEZ - Registered Nurse

## 2024-02-01 NOTE — PROGRESS NOTES
Spoke with patient and spouse at facility 2/1/24, verified contact information, patient agreeable to home care and is not active with another home health agency.

## 2024-02-02 ENCOUNTER — TRANSITIONAL CARE MANAGEMENT TELEPHONE ENCOUNTER (OUTPATIENT)
Dept: CALL CENTER | Facility: HOSPITAL | Age: 79
End: 2024-02-02
Payer: MEDICARE

## 2024-02-02 ENCOUNTER — HOME CARE VISIT (OUTPATIENT)
Dept: HOME HEALTH SERVICES | Facility: HOME HEALTHCARE | Age: 79
End: 2024-02-02
Payer: MEDICARE

## 2024-02-02 VITALS
TEMPERATURE: 98.4 F | HEART RATE: 99 BPM | OXYGEN SATURATION: 98 % | RESPIRATION RATE: 18 BRPM | HEIGHT: 68 IN | WEIGHT: 160 LBS | BODY MASS INDEX: 24.25 KG/M2 | SYSTOLIC BLOOD PRESSURE: 126 MMHG | DIASTOLIC BLOOD PRESSURE: 60 MMHG

## 2024-02-02 PROCEDURE — G0299 HHS/HOSPICE OF RN EA 15 MIN: HCPCS

## 2024-02-02 NOTE — Clinical Note
I am seeing patient in the home for Nursing Home Health, family is wondering if there is any blood work that needs to be completed in the coming weeks, and if we can complete this. Thank you   Heather SNYDER [phone]

## 2024-02-02 NOTE — OUTREACH NOTE
Call Center TCM Note      Flowsheet Row Responses   [facility] patient discharged from? Non-   Does the patient have one of the following disease processes/diagnoses(primary or secondary)? Other   TCM attempt successful? Yes   Call start time 1347   Call end time 1350   Discharge diagnosis Orthostatic hypotension   Person spoke with today (if not patient) and relationship patient   Meds reviewed with patient/caregiver? Yes   Is the patient having any side effects they believe may be caused by any medication additions or changes? No   Does the patient have all medications ordered at discharge? Yes   Is the patient taking all medications as directed (includes completed medication regime)? Yes   Comments Patient is home from [place] . He has a hospital followup scheduled on 2/7/2027 with Dr. Sanchez   Does the patient have an appointment with their PCP within 7-14 days of discharge? Yes   Psychosocial issues? No   Did the patient receive a copy of their discharge instructions? Yes   Nursing interventions Reviewed instructions with patient   What is the patient's perception of their health status since discharge? Improving   Is the patient/caregiver able to teach back signs and symptoms related to disease process for when to call PCP? Yes   Is the patient/caregiver able to teach back signs and symptoms related to disease process for when to call 911? Yes   Is the patient/caregiver able to teach back the hierarchy of who to call/visit for symptoms/problems? PCP, Specialist, Home health nurse, Urgent Care, ED, 911 Yes   If the patient is a current smoker, are they able to teach back resources for cessation? Not a smoker   TCM call completed? Yes   Call end time 1350   Would this patient benefit from a Referral to Amb Social Work? No   Is the patient interested in additional calls from an ambulatory ? No            Chsa Soriano RN    2/2/2024, 13:50 EST

## 2024-02-02 NOTE — HOME HEALTH
"SOC Note:  78 year old, who lives at home with wife, prior to hospitalization patient was walking no assistance and raking leaves states \" this all came on so quick\" at this time patient is assist x 1, using a wheelchair majority of the time for transportation and using a walker for 20 feet or less in the home. AOX3 at this visit unable to recall year. Wife is helping with bolus tube feedings, and is worried she isn't going to be able to get him to the doctors appointments, I have reassured her that PT and OT will help with this task and have other family involved to help.   Patient is eating soft texture foods independently, and is recieving tube feedings that wife is providing bolus feedings.     Home Health ordered for: disciplines SN, PT, OT, SLP     Reason for Hosp/Primary Dx/Co-morbidities: Attention to PEG tube, dysphagia, history of CVA  Focus of Care: Attention to PEG tube and wound care     Patient's goal(s): \"to get back to independence\"     Current Functional status/mobility/DME: walker, wheelchair, gait belt, shower chair, grab bars, PEG tube supplies, tube feedings     HB status/Living Arrangements: Lives at home with wife     Skin Integrity/wound status: limited breakdown to coccyx area, PEG tube site     Code Status: DNR    Fall Risk/Safety concerns: unsteady gait, assistance x1     Plan for next visit: PEG tube site care, medication compliance, and wound care to coccyx"

## 2024-02-02 NOTE — Clinical Note
Hello, I am verifying that you will be signing off on Home Health skilled nursing orders.   Thank you   Heather SNYDER   [phone]

## 2024-02-05 ENCOUNTER — HOME CARE VISIT (OUTPATIENT)
Dept: HOME HEALTH SERVICES | Facility: HOME HEALTHCARE | Age: 79
End: 2024-02-05
Payer: MEDICARE

## 2024-02-05 VITALS — HEART RATE: 96 BPM | OXYGEN SATURATION: 99 % | DIASTOLIC BLOOD PRESSURE: 69 MMHG | SYSTOLIC BLOOD PRESSURE: 113 MMHG

## 2024-02-05 PROCEDURE — G0151 HHCP-SERV OF PT,EA 15 MIN: HCPCS

## 2024-02-05 NOTE — HOME HEALTH
"Pt is a 79yo M who reports he was hospitalized secondary to COVID 19 and then developed \"long covid.\"  He was in hospital for 17 days then SNF since Nov 9, with one re-hospitalization related to orthostatic hypotension, then back to SNF.  He has a PEG tube and dysphagia.  He also has a history of CVA, but wife reports that was a long time ago.  Pt experienced a fall last night trying to transfer from recliner with his spouse.  He reports he has chronic LBP. He reports his legs give out every day around 5-6pm.  He goes to bed around 7pm.  He now has a hospital bed, walker, and w/c.    Pt stated goal: \"work on my legs\"  PLOF: independent, raking leaves, no AD, residing in 1 story home with his spouse.  Grossly B LE 3-/5   Skilled PT necessary to instruct pt in HEP, instruct pt/caregiver in safety with transfers, optimize his gait, and decrease his risk for falls.   Plan for next visit: transfer training with pt and spouse, progress from supine/seated HEP to standing HEP as tolerated, gait training as tolerated.   Plan for PT 2wk4."

## 2024-02-06 ENCOUNTER — HOME CARE VISIT (OUTPATIENT)
Dept: HOME HEALTH SERVICES | Facility: HOME HEALTHCARE | Age: 79
End: 2024-02-06
Payer: MEDICARE

## 2024-02-06 VITALS
DIASTOLIC BLOOD PRESSURE: 62 MMHG | SYSTOLIC BLOOD PRESSURE: 118 MMHG | TEMPERATURE: 97.4 F | HEART RATE: 81 BPM | OXYGEN SATURATION: 97 %

## 2024-02-06 VITALS
OXYGEN SATURATION: 97 % | SYSTOLIC BLOOD PRESSURE: 118 MMHG | DIASTOLIC BLOOD PRESSURE: 62 MMHG | HEART RATE: 81 BPM | TEMPERATURE: 97.4 F

## 2024-02-06 PROCEDURE — G0153 HHCP-SVS OF S/L PATH,EA 15MN: HCPCS

## 2024-02-06 PROCEDURE — G0152 HHCP-SERV OF OT,EA 15 MIN: HCPCS

## 2024-02-06 RX ORDER — LOSARTAN POTASSIUM 50 MG/1
50 TABLET ORAL DAILY
COMMUNITY
Start: 2023-12-29 | End: 2024-02-09

## 2024-02-06 RX ORDER — RABEPRAZOLE SODIUM 20 MG/1
20 TABLET, DELAYED RELEASE ORAL DAILY
COMMUNITY
Start: 2023-12-29

## 2024-02-06 NOTE — Clinical Note
Wife is reporting the Syringes we ordered them don't work. She said they have too small of a tip and the bolus goes everywhere when trying to put it in the PEG.    They have some from the Rehab Facilty that do work and they are washing them and reusing them (the NH told them it was ok to do that; wash/reuse.)

## 2024-02-06 NOTE — Clinical Note
"  MEDICAL NECESSITY: Speech Therapy is not indicated at this time. All needs were met this visit.   EVALUATION:   History          EDUCATION  Swallowing precautions  PATIENT'S GOAL:  \"To get dental work done and return to eating regular food textures.\"  RESULTS: Patient is currently eating mechanical soft foods and drinking thin liquids. Wife continues to supplement diet by bolus tube feedings. She reported that she was not sure how much to give patient  (supplements) along with oral intake. She reported that she weighs patient daily and if patient starts to lose weight, she would increase tube feedings. SLP recommended they consult a dietitian for guidance/instructions in tube feedings and oral intake to maintain appropriate nutrition and hydration. Patient reported that he continues to intake mechanical soft foods due to poor dentition. He reported that he plans to get dental work done and return to eating regular food textures. Patient and wife stated that ST services were not needed.   ALL NEEDS MET THIS VISIT. EVALUATION ONLY.  "

## 2024-02-06 NOTE — HOME HEALTH
"CURRENT SITUATION: Pt contracted COVID-19 in mid to late October 2023. He was in the hospital for a long time, no vent required, but did get a PEG for bolus feeding due to dysphagia. He states he has \"long COVID.\" He was d/c'd from the hospital to [facility]. He had to be readmitted to the hospital 2 different times. The 1st was for transfusions, the 2nd was for falls. He returned to [facility] after the 1st re-admit and then d/c to home w/HH after the 2nd re-admit. His most recent re-admit was 01-07-24 to 01-12-24 w/dx of orthostatic dizziness, orthostatic hypotension, BLE weakness.   PMHx: Oral phase dysphagia s/p PEG. Still receiving nutrition via PEG. Most recent VFSS (01-09-24) indicate the need for soft to chew foods with nectar thick liquids. Hx of CVA, polycythemia vera, iron deficiency anemia, chronic anticoagulation, s/p ablation of atrial flutter, GERD, DDD of lumbar spine w/chronic LBP, HTN, CAD due to lipid rich plaque.   OT's FOCUS OF CARE: long-covid/post-hospital weakness. Safety & independence w/ADLs.   SUBJECTIVE: \"I'm still really weak. My legs feel like 100 lbs each.\" Agreeable to OT evaluation.  SOCIAL & ENVIRONMENTAL SITUATION: Pt lives w/supportive wife, one cat, in a well maintained home. 2 steps to enter (one outside at porch door and 1 inside at door home), no rails.  PATIENT'S &/OR CAREGIVER'S GOAL: \"I want to be strong again.\"  INTERVENTIONS: OT Eval, ADL training, Home Safety, Therapeutic Exercise/Activity, Manual Therapy, Transfer/Mobility training, Monitor vitals including SPO2 via pulse-oximeter (notifiy MD if resting O2 <90% on room air), Patient/CG education, Falls-risk prevention, Recommendations on AE, DME, AD, environmental adaptations.  ASSESSMENT: Pt is appropriate to receive skilled OT to help remediate and/or compensate for deficits caused by his current situation/diagnosis. MEDICAL NECESSITY: OT services will help improve safety and independence with ADLs, functional transfers, home " mobility, functional strength, activity tolerance, endurance, condition managment, provide Pt/CG education, and give appropriate recommendations for AD, AE, and DME. OT is needed to lessen the pt's burden of care, improve pt/CG independence in pt's care, and maintain safety in order for pt to remain at home.   PLAN: OT will follow; 1w1, 2w3.  PLAN FOR NEXT VISIT: safety w/dressing. HEP follow up.

## 2024-02-06 NOTE — Clinical Note
2/6/24  ST evaluation completed. Evaluation only. Patient and wife stated that they did not need ST services at this time. SLP recommended they consult a dietition for guidance/instructions on tube feedings and oral intake. No further visits planned.

## 2024-02-07 ENCOUNTER — REFERRAL TRIAGE (OUTPATIENT)
Dept: CASE MANAGEMENT | Facility: OTHER | Age: 79
End: 2024-02-07
Payer: MEDICARE

## 2024-02-07 ENCOUNTER — OFFICE VISIT (OUTPATIENT)
Dept: FAMILY MEDICINE CLINIC | Facility: CLINIC | Age: 79
End: 2024-02-07
Payer: MEDICARE

## 2024-02-07 ENCOUNTER — HOME CARE VISIT (OUTPATIENT)
Dept: HOME HEALTH SERVICES | Facility: HOME HEALTHCARE | Age: 79
End: 2024-02-07
Payer: MEDICARE

## 2024-02-07 ENCOUNTER — PATIENT OUTREACH (OUTPATIENT)
Dept: CASE MANAGEMENT | Facility: OTHER | Age: 79
End: 2024-02-07
Payer: MEDICARE

## 2024-02-07 VITALS
DIASTOLIC BLOOD PRESSURE: 83 MMHG | TEMPERATURE: 97.9 F | BODY MASS INDEX: 24.25 KG/M2 | HEART RATE: 90 BPM | SYSTOLIC BLOOD PRESSURE: 136 MMHG | RESPIRATION RATE: 18 BRPM | HEIGHT: 68 IN | OXYGEN SATURATION: 97 % | WEIGHT: 160 LBS

## 2024-02-07 DIAGNOSIS — R13.10 DYSPHAGIA, UNSPECIFIED TYPE: ICD-10-CM

## 2024-02-07 DIAGNOSIS — D62 ACUTE BLOOD LOSS ANEMIA: ICD-10-CM

## 2024-02-07 DIAGNOSIS — U07.1 COVID-19: Primary | ICD-10-CM

## 2024-02-07 DIAGNOSIS — L89.159 PRESSURE INJURY OF SKIN OF SACRAL REGION, UNSPECIFIED INJURY STAGE: ICD-10-CM

## 2024-02-07 DIAGNOSIS — R53.1 WEAKNESS: ICD-10-CM

## 2024-02-07 DIAGNOSIS — Z74.8 ASSISTANCE NEEDED WITH TRANSPORTATION: ICD-10-CM

## 2024-02-07 DIAGNOSIS — Z09 HOSPITAL DISCHARGE FOLLOW-UP: ICD-10-CM

## 2024-02-07 DIAGNOSIS — D89.832 CYTOKINE RELEASE SYNDROME, GRADE 2: ICD-10-CM

## 2024-02-07 DIAGNOSIS — I63.9 CEREBROVASCULAR ACCIDENT (CVA), UNSPECIFIED MECHANISM: Primary | ICD-10-CM

## 2024-02-07 RX ORDER — ONDANSETRON 4 MG/1
4 TABLET, FILM COATED ORAL EVERY 8 HOURS PRN
COMMUNITY
Start: 2024-02-05

## 2024-02-07 NOTE — HOME HEALTH
"REASON FOR REFERRAL: Speech Therapy referral post hospitalization and rehab for Dysphagia. Speech Therapy to evaluate and establish a plan of care.    PRIMARY DIAGNOSIS: Dysphagia  SECONDARY DIAGNOSIS: COVID-19, History of CVA  VFSS OR FEES: VFSS was performed 1/9 at [facility] and patient was recommended to intake soft to chew food with nectar thick liquids. Patient has had several swallow studies  (hospital and rehab)    PERTINENT HISTORY: COVID-19, Cytokine release syndrome, grade 2, Anemia, History of CVA (cerebrovascular accident), S/P percutaneous endoscopic gastrostomy (PEG) tube placement, Mandel esophagus, Leukocytosis, Sacral decubitus ulcer, Oral phase dysphagia, Acute blood loss anemia, Hypoxia, Orthostatic dizziness, Orthostatic hypotension,  Bilateral leg weakness, Weakness, Pneumonia     Patient had COVID-19 and was hospitalized 10/23/23. Patient was not able to swallow and was fed via NG-tube. In November 2023, PEG tube placed and patient was recommended NPO with PEG feeds. Patient reported that he has had several VFSS (hospital and in rehab).  Patient was gradually advanced in diet.   PRIOR LEVEL OF FUNCTION: Patient living in his single family home with his wife. Patient was walking with no assistance and doing yard work (raking leaves).      MEDICAL NECESSITY: Speech Therapy is not indicated at this time. All needs were met this visit.   EVALUATION:   History          EDUCATION  Swallowing precautions  PATIENT'S GOAL:  \"To get dental work done and return to eating regular food textures.\"  RESULTS: Patient is currently eating mechanical soft foods and drinking thin liquids. Wife continues to supplement diet by bolus tube feedings. She reported that she was not sure how much to give patient  (supplements) along with oral intake. She reported that she weighs patient daily and if patient starts to lose weight, she would increase tube feedings. SLP recommended they consult a dietitian for " guidance/instructions in tube feedings and oral intake to maintain appropriate nutrition and hydration. Patient reported that he continues to intake mechanical soft foods due to poor dentition. He reported that he plans to get dental work done and return to eating regular food textures. Patient and wife stated that ST services were not needed.   ALL NEEDS MET THIS VISIT. EVALUATION ONLY.

## 2024-02-07 NOTE — OUTREACH NOTE
AMBULATORY CASE MANAGEMENT NOTE    Name and Relationship of Patient/Support Person: Madhu Santoro - Self  Adult Patient Profile  Questions/Answers      Flowsheet Row Most Recent Value   Symptoms/Conditions Managed at Home neurological, skin   Neurological Symptoms/Conditions stroke, ischemic   Neurological Management Strategies routine screening, medication therapy, diet modification   Neurological Self-Management Outcome 3 (uncertain)   Neurological Comment lower leg weakness/ reliance on WC   Skin Symptoms/Conditions wound   Skin Management Strategies diet modification, routine screening, dressing changes   Skin Self-Management Outcome 3 (uncertain)          CCM Interim Update    Met with patient today in office with provider.  Verified patient by name and date of birth.  Agreeable to CCM program    Stating history of CVA, current issue with incontinence on treatment, tolerating well.    Patient is wheelchair bound and caregiver is spouse.  Current need being transportation to medical appointments d/t wheelchair use.    Care Coordination    ACM completed thorough chart review.  Introduced self and provided CCM rack card and contact information.  Discussed CCM benefits and current patient needs in home and transportation.  Mailed Wheels information to patient home address.  Will look into possible need for home ramp to make accessing home easier, spouse agreeable.      Plan: follow up 2-3 weeks  - incont., treatment therapy working?  - skin integrity?  - received wheels information? Questions?  - further nursing needs?        Education Documentation  No documentation found.        Crystal DELEON  Ambulatory Case Management    2/7/2024, 15:44 EST

## 2024-02-08 ENCOUNTER — HOME CARE VISIT (OUTPATIENT)
Dept: HOME HEALTH SERVICES | Facility: HOME HEALTHCARE | Age: 79
End: 2024-02-08
Payer: MEDICARE

## 2024-02-08 ENCOUNTER — OFFICE VISIT (OUTPATIENT)
Dept: CARDIOLOGY | Facility: CLINIC | Age: 79
End: 2024-02-08
Payer: MEDICARE

## 2024-02-08 VITALS
HEART RATE: 89 BPM | BODY MASS INDEX: 24.33 KG/M2 | HEIGHT: 68 IN | OXYGEN SATURATION: 98 % | SYSTOLIC BLOOD PRESSURE: 130 MMHG | DIASTOLIC BLOOD PRESSURE: 64 MMHG

## 2024-02-08 DIAGNOSIS — I25.10 CORONARY ARTERY DISEASE DUE TO LIPID RICH PLAQUE: Primary | ICD-10-CM

## 2024-02-08 DIAGNOSIS — I25.83 CORONARY ARTERY DISEASE DUE TO LIPID RICH PLAQUE: Primary | ICD-10-CM

## 2024-02-08 DIAGNOSIS — I10 BENIGN ESSENTIAL HYPERTENSION: ICD-10-CM

## 2024-02-08 DIAGNOSIS — I48.3 TYPICAL ATRIAL FLUTTER: ICD-10-CM

## 2024-02-08 PROCEDURE — 99214 OFFICE O/P EST MOD 30 MIN: CPT | Performed by: INTERNAL MEDICINE

## 2024-02-08 PROCEDURE — 3075F SYST BP GE 130 - 139MM HG: CPT | Performed by: INTERNAL MEDICINE

## 2024-02-08 PROCEDURE — 3078F DIAST BP <80 MM HG: CPT | Performed by: INTERNAL MEDICINE

## 2024-02-08 NOTE — PROGRESS NOTES
"      CARDIOLOGY    Massimo Jordan MD    ENCOUNTER DATE:  02/08/2024    Madhu Santoro / 78 y.o. / male        CHIEF COMPLAINT / REASON FOR OFFICE VISIT     Coronary Artery Disease (01/12/23 Follow up)  Hypertension  Atrial flutter    HISTORY OF PRESENT ILLNESS       Coronary Artery Disease      Madhu Santoro is a 78 y.o. male who presents today for reevaluation.  Patient seen Dr. Wright in the past.  Unfortunately he has developed long-haul COVID symptoms.  Patient is still very weak has difficulties walking and he was in a wheelchair today.  He denies any types of cardiac symptoms no chest pain shortness of breath dizziness or lightheadedness.      The following portions of the patient's history were reviewed and updated as appropriate: allergies, current medications, past family history, past medical history, past social history, past surgical history and problem list.      VITAL SIGNS     Visit Vitals  /64 (BP Location: Left arm)   Pulse 89   Ht 172.7 cm (68\")   SpO2 98%   BMI 24.33 kg/m²         Wt Readings from Last 3 Encounters:   02/06/24 72.6 kg (160 lb)   02/02/24 72.6 kg (160 lb)   01/11/24 81.4 kg (179 lb 7.3 oz)     Body mass index is 24.33 kg/m².      REVIEW OF SYSTEMS   ROS        PHYSICAL EXAMINATION     Vitals reviewed.   Constitutional:       Appearance: Healthy appearance.   Pulmonary:      Effort: Pulmonary effort is normal.   Cardiovascular:      Normal rate. Regular rhythm. Normal S1. Normal S2.       Murmurs: There is no murmur.      No gallop.  No click. No rub.   Pulses:     Intact distal pulses.   Edema:     Peripheral edema present.     Ankle: bilateral 1+ edema of the ankle.     Feet: bilateral 1+ edema of the feet.  Neurological:      Mental Status: Alert.           REVIEWED DATA     Procedures    Cardiac Procedures:      Lipid Panel          7/3/2023    08:51   Lipid Panel   Total Cholesterol 113    Triglycerides 83    HDL Cholesterol 38    VLDL Cholesterol 17    LDL " Cholesterol  58          ASSESSMENT & PLAN      Diagnosis Plan   1. Coronary artery disease due to lipid rich plaque        2. Typical atrial flutter        3. Benign essential hypertension              SUMMARY/DISCUSSION  Coronary artery disease.  Clinically stable.  Hypertension blood pressures good.  Long-haul COVID syndrome with quite a bit of weakness.  He does see Dr. Sanchez.  He is having some lower extremity edema mostly in his ankles and feet.  It comes out of the diffuse pitting kind most consistent with amlodipine or some type of medication.  I told him to cut down his amlodipine to once a day see if it helps.  We may need to stop it altogether I also want to follow his blood pressure make sure is not going up.  Will follow back up in 6 months sooner if issues.        MEDICATIONS         Discharge Medications            Accurate as of February 8, 2024  2:07 PM. If you have any questions, ask your nurse or doctor.                Changes to Medications        Instructions Start Date   acetaminophen 325 MG tablet  Commonly known as: TYLENOL  What changed: how to take this   650 mg, Per G Tube, Every 6 Hours PRN      amLODIPine 5 MG tablet  Commonly known as: NORVASC  What changed:   how much to take  when to take this   5 mg, Per G Tube, Daily             Continue These Medications        Instructions Start Date   allopurinol 300 MG tablet  Commonly known as: ZYLOPRIM   300 mg, Oral, Daily      amitriptyline 50 MG tablet  Commonly known as: ELAVIL   50 mg, Per G Tube, Nightly      apixaban 2.5 MG tablet tablet  Commonly known as: ELIQUIS   2.5 mg, Oral, Every 12 Hours Scheduled      HYDROcodone-acetaminophen 7.5-325 MG per tablet  Commonly known as: Norco   1 tablet, Oral, 4 Times Daily PRN, Hold if sedation      ipratropium-albuterol 0.5-2.5 mg/3 ml nebulizer  Commonly known as: DUO-NEB   3 mL, Nebulization, Every 4 Hours PRN      losartan 50 MG tablet  Commonly known as: COZAAR   50 mg, Oral, Daily       ondansetron 4 MG tablet  Commonly known as: ZOFRAN   4 mg, Oral, Every 8 Hours PRN      RABEprazole 20 MG EC tablet  Commonly known as: ACIPHEX   20 mg, Oral, Daily      rosuvastatin 20 MG tablet  Commonly known as: Crestor   20 mg, Per G Tube, Nightly                   **Dragon Disclaimer:   Much of this encounter note is an electronic transcription/translation of spoken language to printed text. The electronic translation of spoken language may permit erroneous, or at times, nonsensical words or phrases to be inadvertently transcribed. Although I have reviewed the note for such errors, some may still exist.

## 2024-02-09 ENCOUNTER — HOME CARE VISIT (OUTPATIENT)
Dept: HOME HEALTH SERVICES | Facility: HOME HEALTHCARE | Age: 79
End: 2024-02-09
Payer: MEDICARE

## 2024-02-09 PROCEDURE — G0151 HHCP-SERV OF PT,EA 15 MIN: HCPCS

## 2024-02-09 PROCEDURE — G0300 HHS/HOSPICE OF LPN EA 15 MIN: HCPCS

## 2024-02-10 VITALS
SYSTOLIC BLOOD PRESSURE: 140 MMHG | DIASTOLIC BLOOD PRESSURE: 70 MMHG | HEART RATE: 98 BPM | OXYGEN SATURATION: 98 % | RESPIRATION RATE: 18 BRPM

## 2024-02-10 NOTE — HOME HEALTH
"Subjective: Per spouse:\"He's doing better as the week has gone on.\"    Falls reported: none    Medication changes: none    Plan for next visit: Continue transfer training, gait training with rolling walker, stair training, HEP upgrade to include standing as tolerated, and patient spouse education as needed"

## 2024-02-12 ENCOUNTER — HOME CARE VISIT (OUTPATIENT)
Dept: HOME HEALTH SERVICES | Facility: HOME HEALTHCARE | Age: 79
End: 2024-02-12
Payer: MEDICARE

## 2024-02-12 VITALS
TEMPERATURE: 97.2 F | HEART RATE: 73 BPM | RESPIRATION RATE: 18 BRPM | OXYGEN SATURATION: 98 % | DIASTOLIC BLOOD PRESSURE: 76 MMHG | SYSTOLIC BLOOD PRESSURE: 140 MMHG

## 2024-02-12 VITALS
OXYGEN SATURATION: 99 % | SYSTOLIC BLOOD PRESSURE: 138 MMHG | RESPIRATION RATE: 18 BRPM | TEMPERATURE: 97.1 F | HEART RATE: 91 BPM | DIASTOLIC BLOOD PRESSURE: 82 MMHG

## 2024-02-12 PROCEDURE — G0151 HHCP-SERV OF PT,EA 15 MIN: HCPCS

## 2024-02-12 PROCEDURE — G0152 HHCP-SERV OF OT,EA 15 MIN: HCPCS

## 2024-02-13 ENCOUNTER — HOME CARE VISIT (OUTPATIENT)
Dept: HOME HEALTH SERVICES | Facility: HOME HEALTHCARE | Age: 79
End: 2024-02-13
Payer: MEDICARE

## 2024-02-13 VITALS
OXYGEN SATURATION: 98 % | TEMPERATURE: 97.5 F | SYSTOLIC BLOOD PRESSURE: 148 MMHG | HEART RATE: 78 BPM | DIASTOLIC BLOOD PRESSURE: 78 MMHG

## 2024-02-13 PROCEDURE — G0300 HHS/HOSPICE OF LPN EA 15 MIN: HCPCS

## 2024-02-14 ENCOUNTER — HOME CARE VISIT (OUTPATIENT)
Dept: HOME HEALTH SERVICES | Facility: HOME HEALTHCARE | Age: 79
End: 2024-02-14
Payer: MEDICARE

## 2024-02-14 VITALS
HEART RATE: 87 BPM | RESPIRATION RATE: 18 BRPM | SYSTOLIC BLOOD PRESSURE: 138 MMHG | OXYGEN SATURATION: 98 % | TEMPERATURE: 97 F | DIASTOLIC BLOOD PRESSURE: 70 MMHG

## 2024-02-14 VITALS
HEART RATE: 88 BPM | SYSTOLIC BLOOD PRESSURE: 142 MMHG | RESPIRATION RATE: 18 BRPM | DIASTOLIC BLOOD PRESSURE: 68 MMHG | TEMPERATURE: 97.4 F | OXYGEN SATURATION: 98 %

## 2024-02-14 DIAGNOSIS — R60.0 EDEMA LEG: Primary | ICD-10-CM

## 2024-02-14 PROCEDURE — G0151 HHCP-SERV OF PT,EA 15 MIN: HCPCS

## 2024-02-15 ENCOUNTER — HOME CARE VISIT (OUTPATIENT)
Dept: HOME HEALTH SERVICES | Facility: HOME HEALTHCARE | Age: 79
End: 2024-02-15
Payer: MEDICARE

## 2024-02-15 PROCEDURE — G0152 HHCP-SERV OF OT,EA 15 MIN: HCPCS

## 2024-02-16 ENCOUNTER — TELEPHONE (OUTPATIENT)
Dept: CASE MANAGEMENT | Facility: OTHER | Age: 79
End: 2024-02-16
Payer: MEDICARE

## 2024-02-16 VITALS
HEART RATE: 92 BPM | TEMPERATURE: 97.2 F | DIASTOLIC BLOOD PRESSURE: 72 MMHG | OXYGEN SATURATION: 98 % | SYSTOLIC BLOOD PRESSURE: 138 MMHG

## 2024-02-19 ENCOUNTER — TELEPHONE (OUTPATIENT)
Dept: FAMILY MEDICINE CLINIC | Facility: CLINIC | Age: 79
End: 2024-02-19
Payer: MEDICARE

## 2024-02-19 ENCOUNTER — HOME CARE VISIT (OUTPATIENT)
Dept: HOME HEALTH SERVICES | Facility: HOME HEALTHCARE | Age: 79
End: 2024-02-19
Payer: MEDICARE

## 2024-02-19 VITALS
RESPIRATION RATE: 18 BRPM | OXYGEN SATURATION: 99 % | SYSTOLIC BLOOD PRESSURE: 126 MMHG | DIASTOLIC BLOOD PRESSURE: 70 MMHG | HEART RATE: 95 BPM | TEMPERATURE: 98.1 F

## 2024-02-19 PROCEDURE — G0152 HHCP-SERV OF OT,EA 15 MIN: HCPCS

## 2024-02-19 NOTE — Clinical Note
Pt is with c/o UTI; burning, stinging, generalized weakness, frequent urination, difficult to initiate stream, decreased activity tolerance/endurance and weakness in BLE (shaky.) He has no fever.    Wife has called this report in to PCP office today. She requested a sterile specimen cup for her to go . .     Thank you.

## 2024-02-19 NOTE — HOME HEALTH
"SUBJECTIVE: \"I think I have a pretty bad UTI. It stings and burns when I go. And my legs are like jelly again. I feel like I'm right back to the start.\" (PT, SN notified.)  Instructed wife to call pt's PCP for report of symptoms. She did so.   PLAN FOR NEXT VISIT: f/u on LBD from sitting on EOB."

## 2024-02-19 NOTE — TELEPHONE ENCOUNTER
Patient believes he has UTI & would like to have spouse come  sterile urine cup to bring back to office for urinalysis   Please call Brooke to let her know if this can be done today.

## 2024-02-19 NOTE — TELEPHONE ENCOUNTER
Pt will need to schedule an appointment for further evaluation with dr giron. Spoke with cristhian patients' spouse she was told this

## 2024-02-20 ENCOUNTER — HOME CARE VISIT (OUTPATIENT)
Dept: HOME HEALTH SERVICES | Facility: HOME HEALTHCARE | Age: 79
End: 2024-02-20
Payer: MEDICARE

## 2024-02-20 VITALS
TEMPERATURE: 96.8 F | OXYGEN SATURATION: 99 % | DIASTOLIC BLOOD PRESSURE: 70 MMHG | RESPIRATION RATE: 18 BRPM | SYSTOLIC BLOOD PRESSURE: 130 MMHG | HEART RATE: 79 BPM

## 2024-02-20 PROCEDURE — G0151 HHCP-SERV OF PT,EA 15 MIN: HCPCS

## 2024-02-20 NOTE — HOME HEALTH
"Subjective: Per spouse: \"I'm not sure any of the therapy is really helping him. I still need to help him get up from chairs and his right leg still gets very shaky at times.\"    Falls reported: none    Medication changes: none    Plan for next visit: continue gait training thru home and reattempt outdoor gait and car transfer, review HEP and upgrade as tolerated, and patient spouse education as needed"

## 2024-02-21 ENCOUNTER — HOME CARE VISIT (OUTPATIENT)
Dept: HOME HEALTH SERVICES | Facility: HOME HEALTHCARE | Age: 79
End: 2024-02-21
Payer: MEDICARE

## 2024-02-21 VITALS
SYSTOLIC BLOOD PRESSURE: 132 MMHG | OXYGEN SATURATION: 97 % | HEART RATE: 72 BPM | DIASTOLIC BLOOD PRESSURE: 66 MMHG | RESPIRATION RATE: 18 BRPM | TEMPERATURE: 98.4 F

## 2024-02-21 PROCEDURE — G0300 HHS/HOSPICE OF LPN EA 15 MIN: HCPCS

## 2024-02-21 NOTE — Clinical Note
Dr. Sanchez,    My patient Madhu Barnett has stated to me that he has not had a BM in 7 days.  He has taken Ducolax tablets but no results as of today.   Please let me know how you would like him to proceed next.  Would you want for him to get checked for a impaction in the ER or have wife administer a fleet enema which he has at home,  Please adivise, thank you for your time,    Regards  Alysia Barkley LPN

## 2024-02-21 NOTE — Clinical Note
Dr. Sanchez,    On patient on Damian Sanchez, he is c/o pain on urination and hard sometimes to start a stream, may we obtain a urine sample for U/A C & S with results sent to you?    Regards  Alysia Barkley LPN

## 2024-02-22 ENCOUNTER — HOME CARE VISIT (OUTPATIENT)
Dept: HOME HEALTH SERVICES | Facility: HOME HEALTHCARE | Age: 79
End: 2024-02-22
Payer: MEDICARE

## 2024-02-22 ENCOUNTER — HOSPITAL ENCOUNTER (EMERGENCY)
Facility: HOSPITAL | Age: 79
Discharge: HOME | End: 2024-02-22
Attending: EMERGENCY MEDICINE
Payer: MEDICARE

## 2024-02-22 ENCOUNTER — APPOINTMENT (OUTPATIENT)
Dept: CT IMAGING | Facility: HOSPITAL | Age: 79
End: 2024-02-22
Payer: MEDICARE

## 2024-02-22 VITALS
DIASTOLIC BLOOD PRESSURE: 62 MMHG | TEMPERATURE: 97.5 F | SYSTOLIC BLOOD PRESSURE: 132 MMHG | WEIGHT: 160 LBS | OXYGEN SATURATION: 94 % | HEART RATE: 87 BPM | RESPIRATION RATE: 16 BRPM | BODY MASS INDEX: 24.25 KG/M2 | HEIGHT: 68 IN

## 2024-02-22 DIAGNOSIS — K59.00 CONSTIPATION, UNSPECIFIED CONSTIPATION TYPE: Primary | ICD-10-CM

## 2024-02-22 LAB
ALBUMIN SERPL-MCNC: 3.7 G/DL (ref 3.5–5.2)
ALBUMIN/GLOB SERPL: 1.2 G/DL
ALP SERPL-CCNC: 122 U/L (ref 39–117)
ALT SERPL W P-5'-P-CCNC: 10 U/L (ref 1–41)
ANION GAP SERPL CALCULATED.3IONS-SCNC: 11.3 MMOL/L (ref 5–15)
AST SERPL-CCNC: 19 U/L (ref 1–40)
BASOPHILS # BLD AUTO: 0.05 10*3/MM3 (ref 0–0.2)
BASOPHILS NFR BLD AUTO: 0.3 % (ref 0–1.5)
BILIRUB SERPL-MCNC: 0.5 MG/DL (ref 0–1.2)
BUN SERPL-MCNC: 12 MG/DL (ref 8–23)
BUN/CREAT SERPL: 15.4 (ref 7–25)
CALCIUM SPEC-SCNC: 9.6 MG/DL (ref 8.6–10.5)
CHLORIDE SERPL-SCNC: 103 MMOL/L (ref 98–107)
CO2 SERPL-SCNC: 25.7 MMOL/L (ref 22–29)
CREAT SERPL-MCNC: 0.78 MG/DL (ref 0.76–1.27)
DEPRECATED RDW RBC AUTO: 53.2 FL (ref 37–54)
EGFRCR SERPLBLD CKD-EPI 2021: 90.7 ML/MIN/1.73
EOSINOPHIL # BLD AUTO: 0.83 10*3/MM3 (ref 0–0.4)
EOSINOPHIL NFR BLD AUTO: 5 % (ref 0.3–6.2)
ERYTHROCYTE [DISTWIDTH] IN BLOOD BY AUTOMATED COUNT: 16.8 % (ref 12.3–15.4)
GLOBULIN UR ELPH-MCNC: 3.1 GM/DL
GLUCOSE SERPL-MCNC: 119 MG/DL (ref 65–99)
HCT VFR BLD AUTO: 43.6 % (ref 37.5–51)
HGB BLD-MCNC: 12.7 G/DL (ref 13–17.7)
IMM GRANULOCYTES # BLD AUTO: 0.15 10*3/MM3 (ref 0–0.05)
IMM GRANULOCYTES NFR BLD AUTO: 0.9 % (ref 0–0.5)
LYMPHOCYTES # BLD AUTO: 1.36 10*3/MM3 (ref 0.7–3.1)
LYMPHOCYTES NFR BLD AUTO: 8.2 % (ref 19.6–45.3)
MCH RBC QN AUTO: 25.2 PG (ref 26.6–33)
MCHC RBC AUTO-ENTMCNC: 29.1 G/DL (ref 31.5–35.7)
MCV RBC AUTO: 86.7 FL (ref 79–97)
MONOCYTES # BLD AUTO: 1.41 10*3/MM3 (ref 0.1–0.9)
MONOCYTES NFR BLD AUTO: 8.5 % (ref 5–12)
NEUTROPHILS NFR BLD AUTO: 12.75 10*3/MM3 (ref 1.7–7)
NEUTROPHILS NFR BLD AUTO: 77.1 % (ref 42.7–76)
NRBC BLD AUTO-RTO: 0 /100 WBC (ref 0–0.2)
PLATELET # BLD AUTO: 635 10*3/MM3 (ref 140–450)
PMV BLD AUTO: 10 FL (ref 6–12)
POTASSIUM SERPL-SCNC: 4.2 MMOL/L (ref 3.5–5.2)
PROT SERPL-MCNC: 6.8 G/DL (ref 6–8.5)
RBC # BLD AUTO: 5.03 10*6/MM3 (ref 4.14–5.8)
SODIUM SERPL-SCNC: 140 MMOL/L (ref 136–145)
WBC NRBC COR # BLD AUTO: 16.55 10*3/MM3 (ref 3.4–10.8)

## 2024-02-22 PROCEDURE — 80053 COMPREHEN METABOLIC PANEL: CPT | Performed by: EMERGENCY MEDICINE

## 2024-02-22 PROCEDURE — 25810000003 SODIUM CHLORIDE 0.9 % SOLUTION: Performed by: EMERGENCY MEDICINE

## 2024-02-22 PROCEDURE — 85025 COMPLETE CBC W/AUTO DIFF WBC: CPT | Performed by: EMERGENCY MEDICINE

## 2024-02-22 PROCEDURE — 99284 EMERGENCY DEPT VISIT MOD MDM: CPT

## 2024-02-22 PROCEDURE — 74176 CT ABD & PELVIS W/O CONTRAST: CPT

## 2024-02-22 RX ORDER — SODIUM CHLORIDE 0.9 % (FLUSH) 0.9 %
10 SYRINGE (ML) INJECTION AS NEEDED
Status: DISCONTINUED | OUTPATIENT
Start: 2024-02-22 | End: 2024-02-22 | Stop reason: HOSPADM

## 2024-02-22 RX ADMIN — SODIUM CHLORIDE 1000 ML: 9 INJECTION, SOLUTION INTRAVENOUS at 10:09

## 2024-02-22 NOTE — HOME HEALTH
Patient states he has not had a BM in 7 days,  SN contacted Dr. Sanchez whom instructed patient to try fleets.  If no response he is go to the ER to be checked for a impaction.  SN educated patient and caregiver on Dr. Li orders.  Caregiver states she will try the fleets.  SN recieved call this morning who instructed caregiver since patient has not has a BM overnight then he was to go to the ER to be checked.  Caregiver voiced understanding.

## 2024-02-22 NOTE — CASE COMMUNICATION
Patient missed a PT visit from [facility] on [id number]     Reason: patient had gone to ER in AM, was treated for constipation and released home. Per Wendie OT, spouse requested no therapy visits rest of week. Will follow up next week.       For your records only.   Per CMS Guidance, MD must be notified of missed/cancelled visits; therefore the prescribed frequency was not met.

## 2024-02-22 NOTE — ED PROVIDER NOTES
EMERGENCY DEPARTMENT ENCOUNTER    Room Number:  35/35  PCP: Damian Sanchez MD    HPI:  Chief Complaint: Constipation  A complete HPI/ROS/PMH/PSH/SH/FH are unobtainable due to: None  Context: Madhu Santoro is a 78 y.o. male who presents to the ED c/o acute constipation.  He reports last bowel movement 10 to 12 days ago.  He denies having any fever, vomiting, encopresis, abdominal pain.  He has tried Dulcolax, suppository, milk of magnesia and Fleet enema without success.        PAST MEDICAL HISTORY  Active Ambulatory Problems     Diagnosis Date Noted    Anxiety     Arthritis     Coronary artery disease due to lipid rich plaque     HLD (hyperlipidemia)     Benign essential hypertension     DDD (degenerative disc disease), lumbosacral     Cerebrovascular accident     Encounter for screening for cardiovascular disorders 11/20/2012    Gastroesophageal reflux disease 04/04/2016    Hyperlipidemia 04/04/2016    Stenosis of carotid artery 04/26/2017    Former smoker 04/26/2017    History of cardiac catheterization 12/16/2011    S/P ablation of atrial flutter 04/26/2017    Typical atrial flutter 04/26/2017    S/P CABG (coronary artery bypass graft) 04/26/2017    Adenomatous polyp of ascending colon 02/12/2019    Abdominal bloating 02/12/2019    Stroke 03/29/2019    PAD (peripheral artery disease) 03/29/2019    Acute cholecystitis 09/06/2019    Right upper quadrant abdominal pain 09/06/2019    Chronic pain of both hips 03/31/2021    Chronic bilateral low back pain without sciatica 03/31/2021    Sacroiliac joint dysfunction of both sides 03/31/2021    Encounter for long-term (current) use of high-risk medication 03/31/2021    Lumbar facet arthropathy 03/31/2021    Stroke-like symptoms 04/10/2021    CVA (cerebral vascular accident) 04/11/2021    Personal history of colonic polyps 07/23/2021    Arthralgia of multiple joints 09/28/2021    Iron deficiency 08/16/2022    Thrombocytosis 08/16/2022    Left ureteral stone 08/22/2022     Obstructive uropathy 08/22/2022    Chronic anticoagulation 08/22/2022    Facial skin lesion 08/22/2022    Iron deficiency anemia 09/23/2022    Polycythemia 01/18/2023    Neuropathy 02/08/2023    Weakness 02/19/2023    Pneumonia 02/19/2023    Close exposure to COVID-19 virus 02/19/2023    Polycythemia vera 04/17/2023    Chronic gout without tophus 07/09/2023    COVID-19 10/23/2023    Cytokine release syndrome, grade 2 11/09/2023    Anemia 11/22/2023    History of CVA (cerebrovascular accident) 11/22/2023    S/P percutaneous endoscopic gastrostomy (PEG) tube placement 11/22/2023    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    Sacral decubitus ulcer 11/22/2023    Oral phase dysphagia 11/24/2023    Acute blood loss anemia 11/24/2023    Orthostatic dizziness 01/07/2024    Orthostatic hypotension 01/09/2024    Bilateral leg weakness 01/09/2024     Resolved Ambulatory Problems     Diagnosis Date Noted    Elevated troponin 11/22/2023    Leukocytosis 11/22/2023    Hypoxia 11/29/2023     Past Medical History:   Diagnosis Date    Atrial flutter     CAD (coronary artery disease)     Carotid artery disease     Colonic polyp     Cyst of pancreas     GERD (gastroesophageal reflux disease)     H/O bone density study 2013    H/O complete eye exam 2014    History of kidney stone     Hypertension     Kidney stone     Lipid screening 05/31/2013    Low back pain     Screening for prostate cancer 07/07/2015    Skin cancer          PAST SURGICAL HISTORY  Past Surgical History:   Procedure Laterality Date    CARDIAC CATHETERIZATION N/A 04/10/2017    Procedure: Left Heart Cath;  Surgeon: Marjorie Healy MD;  Location:  [facility]AS[facility];  Service:     CARDIAC CATHETERIZATION N/A 04/10/2017    Procedure: Coronary angiography;  Surgeon: Marjorie Healy MD;  Location:  [facility];  Service:     CARDIAC CATHETERIZATION N/A 04/10/2017    Procedure: Left ventriculography;  Surgeon: Marjorie Healy MD;  Location:  [facility]  INVASIVE [facility]ATION;  Service:     CARDIAC CATHETERIZATION  2011    CARDIAC ELECTROPHYSIOLOGY PROCEDURE N/A 04/18/2017    Procedure: Ablation atrial flutter;  Surgeon: Jose Antonio Gustafson MD;  Location: [facility] CATH INVASIVE LOCATION;  Service:     CAROTID ENDARTERECTOMY      CHOLECYSTECTOMY      CHOLECYSTECTOMY WITH INTRAOPERATIVE CHOLANGIOGRAM N/A 09/07/2019    Procedure: Laparoscopic cholecystectomy with intraoperative cholangiogram;  Surgeon: Gauri Travis MD;  Location: [facility] MAIN OR;  Service: General    COLONOSCOPY  01/06/2015    Diverticulosis, one TA    COLONOSCOPY N/A 02/14/2019    tics, NBIH, adenomatous polyp x 2    COLONOSCOPY N/A 11/05/2021    Procedure: COLONOSCOPY TO CECUM AND TERM. ILEUM WITH COLD POLYPECTOMIES;  Surgeon: Everton Abel MD;  [facility];  Service: Gastroenterology;  Laterality: N/A;  PRE OP - PERS H/O POLYPS  POST OP - COLON POLYPS,, DIVERTICULOSIS, HEMORRHOIDS    CORONARY ARTERY BYPASS GRAFT N/A 04/11/2017    Procedure: AR STERNOTOMY CORONARY ARTERY BYPASS GRAFT TIMES 3 USING LEFT INTERNAL MAMMARY ARTERY AND LEFT GREATER SAPHENOUS VEIN GRAFT PER ENDOSCOPIC VEIN HARVESTING AND PRP ;  Surgeon: Temo Cortez MD;  Location: [facility] MAIN OR;  Service:     ENDOSCOPY  01/06/2015    HH, Ervin's esophagus    ENDOSCOPY N/A 02/14/2019    Z line irregular, HH, Ervin's esophagus    ENDOSCOPY N/A 11/05/2021    Procedure: ESOPHAGOGASTRODUODENOSCOPY WITH BIOPSIES;  Surgeon: Everton Abel MD;  [facility];  Service: Gastroenterology;  Laterality: N/A;  PRE OP - PERS H/O ERVIN'S  POST OP - IRREG Z LINE    ENDOSCOPY W/ PEG TUBE PLACEMENT N/A 11/6/2023    Procedure: ESOPHAGOGASTRODUODENOSCOPY WITH PERCUTANEOUS ENDOSCOPIC GASTROSTOMY TUBE INSERTION;  Surgeon: Temo Ramsey MD;  [facility];  Service: General;  Laterality: N/A;  Pre: dysphagia  Post: same    KNEE SURGERY Left     URETEROSCOPY LASER LITHOTRIPSY WITH STENT INSERTION  Left 8/23/2022    Procedure: Cysto retrograde with left uretro stent placement;  Surgeon: Damien Oliveira MD;  Location: [facility];  Service: Urology;  Laterality: Left;    VASECTOMY           FAMILY HISTORY  Family History   Problem Relation Age of Onset    Hypertension Mother     Heart disease Mother     Heart attack Mother     Stroke Mother     Heart disease Father     Heart attack Father     Stroke Father     Hypertension Sister     Heart attack Brother     Heart disease Brother     No Known Problems Brother     Heart disease Brother     Heart attack Brother     Diabetes Brother     No Known Problems Maternal Grandmother     No Known Problems Maternal Grandfather     No Known Problems Paternal Grandmother     No Known Problems Paternal Grandfather     Pancreatic cancer Paternal Cousin     Arthritis Other     Diabetes Other     Hypertension Other     Kidney disease Other         stones    Malig Hyperthermia Neg Hx          SOCIAL HISTORY  Social History     Socioeconomic History    Marital status:      Spouse name: Brooke    Years of education: 9th grade   Tobacco Use    Smoking status: Former     Packs/day: 1     Types: Cigarettes     Start date: 1/1/1959     Quit date: 1/1/2009     Years since quitting: 15.1    Smokeless tobacco: Never    Tobacco comments:     CAFFEINE USE   Vaping Use    Vaping Use: Never used   Substance and Sexual Activity    Alcohol use: Not Currently     Comment: rarely    Drug use: No    Sexual activity: Defer     Partners: Female         ALLERGIES  Atorvastatin and Penicillins        REVIEW OF SYSTEMS  Review of Systems     Included in HPI  All systems reviewed and negative except for those discussed in HPI.       PHYSICAL EXAM  ED Triage Vitals [02/22/24 0940]   Temp Heart Rate Resp BP SpO2   97.5 °F (36.4 °C) 90 20 125/62 95 %      Temp src Heart Rate Source Patient Position BP Location FiO2 (%)   -- -- -- -- --       Physical Exam      GENERAL: no acute  distress  HENT: nares patent  EYES: no scleral icterus  CV: regular rhythm, normal rate  RESPIRATORY: normal effort  ABDOMEN: soft, nontender  RECTAL: Small amount of stool within the rectal vault, there is a noticeable increase stool burden more distal to my fingertip when I performed a rectal examination  MUSCULOSKELETAL: no deformity  NEURO: alert, moves all extremities, follows commands  PSYCH:  calm, cooperative  SKIN: warm, dry    Vital signs and nursing notes reviewed.          LAB RESULTS  Recent Results (from the past 24 hour(s))   Comprehensive Metabolic Panel    Collection Time: 02/22/24 10:05 AM    Specimen: Blood   Result Value Ref Range    Glucose 119 (H) 65 - 99 mg/dL    BUN 12 8 - 23 mg/dL    Creatinine 0.78 0.76 - 1.27 mg/dL    Sodium 140 136 - 145 mmol/L    Potassium 4.2 3.5 - 5.2 mmol/L    Chloride 103 98 - 107 mmol/L    CO2 25.7 22.0 - 29.0 mmol/L    Calcium 9.6 8.6 - 10.5 mg/dL    Total Protein 6.8 6.0 - 8.5 g/dL    Albumin 3.7 3.5 - 5.2 g/dL    ALT (SGPT) 10 1 - 41 U/L    AST (SGOT) 19 1 - 40 U/L    Alkaline Phosphatase 122 (H) 39 - 117 U/L    Total Bilirubin 0.5 0.0 - 1.2 mg/dL    Globulin 3.1 gm/dL    A/G Ratio 1.2 g/dL    BUN/Creatinine Ratio 15.4 7.0 - 25.0    Anion Gap 11.3 5.0 - 15.0 mmol/L    eGFR 90.7 >60.0 mL/min/1.73   CBC Auto Differential    Collection Time: 02/22/24 10:05 AM    Specimen: Blood   Result Value Ref Range    WBC 16.55 (H) 3.40 - 10.80 10*3/mm3    RBC 5.03 4.14 - 5.80 10*6/mm3    Hemoglobin 12.7 (L) 13.0 - 17.7 g/dL    Hematocrit 43.6 37.5 - 51.0 %    MCV 86.7 79.0 - 97.0 fL    MCH 25.2 (L) 26.6 - 33.0 pg    MCHC 29.1 (L) 31.5 - 35.7 g/dL    RDW 16.8 (H) 12.3 - 15.4 %    RDW-SD 53.2 37.0 - 54.0 fl    MPV 10.0 6.0 - 12.0 fL    Platelets 635 (H) 140 - 450 10*3/mm3    Neutrophil % 77.1 (H) 42.7 - 76.0 %    Lymphocyte % 8.2 (L) 19.6 - 45.3 %    Monocyte % 8.5 5.0 - 12.0 %    Eosinophil % 5.0 0.3 - 6.2 %    Basophil % 0.3 0.0 - 1.5 %    Immature Grans % 0.9 (H) 0.0 - 0.5 %     Neutrophils, Absolute 12.75 (H) 1.70 - 7.00 10*3/mm3    Lymphocytes, Absolute 1.36 0.70 - 3.10 10*3/mm3    Monocytes, Absolute 1.41 (H) 0.10 - 0.90 10*3/mm3    Eosinophils, Absolute 0.83 (H) 0.00 - 0.40 10*3/mm3    Basophils, Absolute 0.05 0.00 - 0.20 10*3/mm3    Immature Grans, Absolute 0.15 (H) 0.00 - 0.05 10*3/mm3    nRBC 0.0 0.0 - 0.2 /100 WBC       Ordered the above labs and reviewed the results.        RADIOLOGY  CT Abdomen Pelvis Without Contrast    Result Date: 2/22/2024  CT SCAN OF THE ABDOMEN AND PELVIS WITHOUT CONTRAST  HISTORY: Constipation. Abdominal pain.  The examination was performed as an emergency procedure through the abdomen and pelvis without contrast. It is compared to previous examinations including previous CT scans of the abdomen and pelvis dated 11/27/2023. The following findings are present: 1. There is a gastrostomy tube in place within the stomach without change. No complicating features are seen. There is considerable dense stool scattered throughout the colon and extending into the rectosigmoid colon and consistent with an element of constipation. There is no large or small bowel distention or evidence of inflammatory change.  2. There is some minimal dependent atelectasis at the lung bases. The liver, spleen, pancreas, both adrenal glands, both kidneys are unremarkable without intravenous contrast. There is a 3 mm nonobstructing stone in the right kidney that is unchanged. The gallbladder has been removed.  3. There is prominent calcification of the abdominal aorta with slight aneurysmal enlargement of the infrarenal aorta measuring 2.5 cm. There is extensive calcification of the left and right common iliac arteries with an appearance of associated severe stenosis of the right common iliac artery as better visualized on the previous examination with intravenous contrast. The remainder of the pelvis is unremarkable.     Radiation dose reduction techniques were utilized, including  automated exposure control and exposure modulation based on body size.   This report was finalized on 2/22/2024 12:03 PM by Dr. Vik Hawkins M.D on Workstation: [id number]       Ordered the above noted radiological studies. Reviewed by me in PACS.        MEDICATIONS GIVEN IN ER  Medications   sodium chloride 0.9 % flush 10 mL (has no administration in time range)   sodium chloride 0.9 % bolus 1,000 mL (0 mL Intravenous Stopped 2/22/24 1104)         ORDERS PLACED DURING THIS VISIT:  Orders Placed This Encounter   Procedures    CT Abdomen Pelvis Without Contrast    Comprehensive Metabolic Panel    CBC Auto Differential    Soap suds enema    Insert Peripheral IV    CBC & Differential         OUTPATIENT MEDICATION MANAGEMENT:  Current Facility-Administered Medications Ordered in Epic   Medication Dose Route Frequency Provider Last Rate Last Admin    cyanocobalamin injection 1,000 mcg  1,000 mcg Intramuscular Q30 Days Damian Sanchez MD   1,000 mcg at 04/06/23 0957    sodium chloride 0.9 % flush 10 mL  10 mL Intravenous PRN Panchito Johnson II, MD         Current Outpatient Medications Ordered in Epic   Medication Sig Dispense Refill    acetaminophen (TYLENOL) 325 MG tablet Administer 2 tablets per G tube Every 6 (Six) Hours As Needed for Mild Pain. (Patient taking differently: Take 2 tablets by mouth Every 6 (Six) Hours As Needed for Mild Pain.)      allopurinol (ZYLOPRIM) 300 MG tablet Take 1 tablet by mouth Daily.      amitriptyline (ELAVIL) 50 MG tablet Administer 1 tablet per G tube Every Night.      amLODIPine (NORVASC) 5 MG tablet Administer 1 tablet per G tube Daily. (Patient taking differently: Administer 2.5 mg per G tube Daily.)      apixaban (ELIQUIS) 2.5 MG tablet tablet Take 1 tablet by mouth Every 12 (Twelve) Hours. Indications: Atrial Fibrillation      folic acid (FOLVITE) 1 MG tablet Take 1 mg by mouth Daily.      HYDROcodone-acetaminophen (Norco) 7.5-325 MG per tablet Take 1 tablet by mouth 4 (Four)  Times a Day As Needed for Moderate Pain or Severe Pain. Hold if sedation 12 tablet 0    ipratropium-albuterol (DUO-NEB) 0.5-2.5 mg/3 ml nebulizer Take 3 mL by nebulization Every 4 (Four) Hours As Needed for Shortness of Air.      ondansetron (ZOFRAN) 4 MG tablet Take 1 tablet by mouth Every 8 (Eight) Hours As Needed.      RABEprazole (ACIPHEX) 20 MG EC tablet Take 1 tablet by mouth Daily.      rosuvastatin (Crestor) 20 MG tablet Administer 1 tablet per G tube Every Night.      sucralfate (Carafate) 1 g tablet Take 1 g by mouth Daily. Patient only takes 1 time a day         PROCEDURES  Procedures          MEDICAL DECISION MAKING, PROGRESS, and CONSULTS    Discussion below represents my analysis of pertinent findings related to patient's condition, differential diagnosis, treatment plan and final disposition.      Additional sources:  - Discussed/obtained information from independent historians: Wife at bedside  Additional information was obtained to confirm the patient's history.      Differential diagnosis:    Bowel obstruction, poor hydration or diet or activity level crating constipation, drug-induced constipation             Independent interpretation of labs, radiology studies, and discussions with consultants:  ED Course as of 02/22/24 1338   Thu Feb 22, 2024   1200 CT imaging results discussed with Dr. Hawkins, radiology.  Patient does have considerable dense stool scattered throughout the colon extending into the rectosigmoid colon.  No large or small bowel distention. [TD]      ED Course User Index  [TD] Panchito Johnson II, MD         Patient given an enema in the emergency department.  He had good bowel movement.  He is feeling overall better.  I also discussed using magnesium citrate at home as needed as a one-time dose.        DIAGNOSIS  Final diagnoses:   Constipation, unspecified constipation type         DISPOSITION  DISCHARGE    FOLLOW-UP  Damian Sanchez MD  [address]  [address]  [facility] [address]  [phone]    Schedule an appointment as soon as possible for a visit in 1 week  As needed    [facility] EMERGENCY DEPARTMENT  [address]  [facility] [address] [phone]  Go to   As needed         Medication List        Changed      acetaminophen 325 MG tablet  Commonly known as: TYLENOL  Administer 2 tablets per G tube Every 6 (Six) Hours As Needed for Mild Pain.  What changed: how to take this     amLODIPine 5 MG tablet  Commonly known as: NORVASC  Administer 1 tablet per G tube Daily.  What changed: how much to take                  Latest Documented Vital Signs:  As of 13:38 EST  BP- 132/63 HR- 75 Temp- 97.5 °F (36.4 °C) O2 sat- 94%      --    Please note that portions of this were completed with a voice recognition program.       Note Disclaimer: At [facility], we believe that sharing information builds trust and better relationships. You are receiving this note because you are receiving care at [facility] or recently visited. It is possible you will see health information before a provider has talked with you about it. This kind of information can be easy to misunderstand. To help you fully understand what it means for your health, we urge you to discuss this note with your provider.         Panchito Johnson II, MD  02/22/24 8130

## 2024-02-26 ENCOUNTER — TELEPHONE (OUTPATIENT)
Dept: CASE MANAGEMENT | Facility: OTHER | Age: 79
End: 2024-02-26
Payer: MEDICARE

## 2024-02-26 DIAGNOSIS — I63.9 CEREBROVASCULAR ACCIDENT (CVA), UNSPECIFIED MECHANISM: Primary | ICD-10-CM

## 2024-02-27 ENCOUNTER — HOME CARE VISIT (OUTPATIENT)
Dept: HOME HEALTH SERVICES | Facility: HOME HEALTHCARE | Age: 79
End: 2024-02-27
Payer: MEDICARE

## 2024-02-27 NOTE — Clinical Note
Patient missed an OT visit from [facility] on: 02-27-24.    Reason: UPON ARRIVAL TO HOME, Pt'S SPOUSE MET ME AT THE DOOR. SHE IS ILL W/FEVER, DIARRHEA, VOMITING; THEREFORE, SHE CALLED TO CANCEL TODAY'S VISIT, BUT I DIDN'T RECEIVE THE MESSAGE PRIOR TO ARRIVAL.     Per home health protocol, the MD/PCP must be notified of any missed and/or cancelled visits; therefore, the prescribed frequency for the week was not met.     Thank you.

## 2024-02-28 ENCOUNTER — HOME CARE VISIT (OUTPATIENT)
Dept: HOME HEALTH SERVICES | Facility: HOME HEALTHCARE | Age: 79
End: 2024-02-28
Payer: MEDICARE

## 2024-02-28 ENCOUNTER — LAB REQUISITION (OUTPATIENT)
Dept: LAB | Facility: HOSPITAL | Age: 79
End: 2024-02-28
Payer: MEDICARE

## 2024-02-28 DIAGNOSIS — N39.0 URINARY TRACT INFECTION, SITE NOT SPECIFIED: ICD-10-CM

## 2024-02-28 LAB
BILIRUB UR QL STRIP: NEGATIVE
CLARITY UR: ABNORMAL
COLOR UR: YELLOW
GLUCOSE UR STRIP-MCNC: NEGATIVE MG/DL
HGB UR QL STRIP.AUTO: NEGATIVE
HOLD SPECIMEN: NORMAL
KETONES UR QL STRIP: NEGATIVE
LEUKOCYTE ESTERASE UR QL STRIP.AUTO: NEGATIVE
NITRITE UR QL STRIP: NEGATIVE
PH UR STRIP.AUTO: 7 [PH] (ref 5–8)
PROT UR QL STRIP: ABNORMAL
SP GR UR STRIP: 1.02 (ref 1–1.03)
UROBILINOGEN UR QL STRIP: ABNORMAL

## 2024-02-28 PROCEDURE — G0300 HHS/HOSPICE OF LPN EA 15 MIN: HCPCS

## 2024-02-28 PROCEDURE — 81003 URINALYSIS AUTO W/O SCOPE: CPT | Performed by: INTERNAL MEDICINE

## 2024-02-29 ENCOUNTER — TELEPHONE (OUTPATIENT)
Dept: ONCOLOGY | Facility: CLINIC | Age: 79
End: 2024-02-29
Payer: MEDICARE

## 2024-02-29 ENCOUNTER — PATIENT OUTREACH (OUTPATIENT)
Dept: CASE MANAGEMENT | Facility: OTHER | Age: 79
End: 2024-02-29
Payer: MEDICARE

## 2024-02-29 ENCOUNTER — HOME CARE VISIT (OUTPATIENT)
Dept: HOME HEALTH SERVICES | Facility: HOME HEALTHCARE | Age: 79
End: 2024-02-29
Payer: MEDICARE

## 2024-02-29 ENCOUNTER — TELEPHONE (OUTPATIENT)
Dept: FAMILY MEDICINE CLINIC | Facility: CLINIC | Age: 79
End: 2024-02-29

## 2024-02-29 VITALS
DIASTOLIC BLOOD PRESSURE: 68 MMHG | TEMPERATURE: 96.9 F | RESPIRATION RATE: 18 BRPM | OXYGEN SATURATION: 97 % | SYSTOLIC BLOOD PRESSURE: 122 MMHG | HEART RATE: 83 BPM

## 2024-02-29 DIAGNOSIS — I63.9 CEREBROVASCULAR ACCIDENT (CVA), UNSPECIFIED MECHANISM: Primary | ICD-10-CM

## 2024-02-29 DIAGNOSIS — Z74.8 ASSISTANCE NEEDED WITH TRANSPORTATION: ICD-10-CM

## 2024-02-29 NOTE — OUTREACH NOTE
[facility] End of Month Documentation    This Chronic Medical Management Care Plan for Madhu Santoro, 78 y.o. male, has been monitored and managed; reviewed and a new plan of care implemented for the month of February.  A cumulative time of 22  minutes was spent on this patient record this month, including face to face visit with provider and patient; phone call with care giver; chart review, [facility] program/ current areas of need/ wheels and home repair assistance for ramps and grab bar placement.    Regarding the patient's problems: has Anxiety; Arthritis; Coronary artery disease due to lipid rich plaque; HLD (hyperlipidemia); Benign essential hypertension; DDD (degenerative disc disease), lumbosacral; Cerebrovascular accident; Encounter for screening for cardiovascular disorders; Gastroesophageal reflux disease; Hyperlipidemia; Stenosis of carotid artery; Former smoker; History of cardiac catheterization; S/P ablation of atrial flutter; Typical atrial flutter; S/P CABG (coronary artery bypass graft); Adenomatous polyp of ascending colon; Abdominal bloating; Stroke; PAD (peripheral artery disease); Acute cholecystitis; Right upper quadrant abdominal pain; Chronic pain of both hips; Chronic bilateral low back pain without sciatica; Sacroiliac joint dysfunction of both sides; Encounter for long-term (current) use of high-risk medication; Lumbar facet arthropathy; Stroke-like symptoms; CVA (cerebral vascular accident); Personal history of colonic polyps; Arthralgia of multiple joints; Iron deficiency; Thrombocytosis; Left ureteral stone; Obstructive uropathy; Chronic anticoagulation; Facial skin lesion; Iron deficiency anemia; Polycythemia; Neuropathy; Weakness; Pneumonia; Close exposure to COVID-19 virus; Polycythemia vera; Chronic gout without tophus; COVID-19; Cytokine release syndrome, grade 2; Anemia; History of CVA (cerebrovascular accident); S/P percutaneous endoscopic gastrostomy (PEG) tube placement; Mandel  esophagus; Sacral decubitus ulcer; Oral phase dysphagia; Acute blood loss anemia; Orthostatic dizziness; Orthostatic hypotension; and Bilateral leg weakness on their problem list., the following items were addressed: medical records; medications; referrals to community service providers and any changes can be found within the plan section of the note.  A detailed listing of time spent for chronic care management is tracked within each outreach encounter.  Current medications include:  has a current medication list which includes the following prescription(s): acetaminophen, allopurinol, amitriptyline, amlodipine, apixaban, folic acid, hydrocodone-acetaminophen, ipratropium-albuterol, ondansetron, rabeprazole, rosuvastatin, and sucralfate, and the following Facility-Administered Medications: cyanocobalamin. and the patient is reported to be caregiver will take responsibility for med compliance,  Medications are reported to be effective.  Regarding these diagnoses, referrals were made to the following provider(s):  none.  All notes on chart for PCP to review.    The patient was monitored remotely for medications; pain, social needs/ transportation/ home repain or grab bar placement.    The patient's physical needs include:  physical healthcare; resources for disability needs, wheelchair bound.     The patient's mental support needs include:  needs met    The patient's cognitive support needs include:  needs met, history of CVA    The patient's psychosocial support needs include:  needs met, family and spouse close family ties    The patient's functional needs include: needs assistance for ADLs; resources for disability needs, Transportaiton needs due to wheelchair    The patient's environmental needs include:  not applicable, may benefit from ramp use at home    Care Plan overall comments:  No data recorded    Refer to previous outreach notes for more information on the areas listed above.    Monthly Billing  "Diagnoses  (I63.9) Cerebrovascular accident (CVA), unspecified mechanism    (Z74.8) Assistance needed with transportation    Medications   Medications have been reconciled    Care Plan progress this month:      Recently Modified Care Plans Updates made since 1/29/2024 12:00 AM       Stroke (Adult)           Problem Priority Last Modified     Emotional Adjustment to Disease (Stroke) --  2/7/2024  3:43 PM by Crystal Isaac, RN              Goal Recent Progress Last Modified     Optimal Coping --  2/7/2024  3:43 PM by Crystal Isaac, RN     Evidence-based guidance:   Explore the patient's and family's understanding of the disease; use open-ended questions.   Support adjustment to  €œnew normal\" with focus on maintaining daily life as closely as possible to life before stroke diagnosis.   Express empathy; listen actively by encouraging the patient and family to express feelings, concerns and fears; ask questions and encourage open communication regarding embarrassing or disturbing topics.   Assess for factors that may impact coping or adjustment, such as a pre-existing mental health condition, prognosis, lack of social support, debilitating disease or financial difficulties that include no or insufficient insurance coverage.   Prepare for re-entry into work, school and social activities; establish links or collaborate with mental health resources in the community, such as peer support or advocacy groups.   Assess depression, anxiety, anger, personality change and impact on family/caregiver; consider cognitive behavioral therapy, deep breathing, meditation, visualization, photo or light therapy.   Explore common risk factors for depression, such as personal or family history of depression, substance use and stressors that include missed work, losing ability to do what patient was used to doing, changes in appearance, physical    or sexual abuse.   Destigmatize depression by presenting it as a problem requiring treatment, " rather than a personal weakness.   Recognize and monitor patient's level of emotional distress; consider using a standardized, validated tool.   Refer to financial navigator or  for thorough evaluation and resource assistance.   Explore changes in sexual activity and intimacy, including concerns about body image, physical ability, fear of rejection and change in libido; provide support and encouragement; refer as needed.   Monitor and address signs/symptoms of psychosocial concerns at transitions of care and change in risk or presentation.    Notes:            Task Due Date Last Modified     Support Psychosocial Response to Stroke --  2/7/2024  3:43 PM by Crystal Isaac RN     Care Management Activities:      - not discussed during this outreach      Notes:              Problem Priority Last Modified     Long-Term Care Planning (Stroke) --  2/7/2024  3:43 PM by Crystal Isaac RN              Goal Recent Progress Last Modified     Effective Long-Term Care Planning --  2/7/2024  3:43 PM by Crystal Isaac RN     Evidence-based guidance:   Explain to patient and family/caregiver that it is vital to plan long-term changes in care needs; assess caregiver ability and desire to provide care.   Correlate the discussion of financial planning, intensification of type or level of care and end of life care planning with patient's changing (or declining) function and cognitive ability.   Identify patient's values, goals and preferences to inform future care decisions.   Refer to financial navigator or  for thorough evaluation and resource assistance.   Facilitate family/caregiver meeting, especially when goal is to delay out-of-home placement.   Discuss Medicare or Medicaid, life and long-term care insurance, employee or correction benefits, personal assets, veterans' benefits, social security or social security disability insurance as ways to meet costs.    Encourage inclusion of community resources,  such as meal delivery, respite care, transportation services, local aging councils, adult day care and support groups.   Encourage investigation of services, such as home health aide, in-home skilled nursing, physical therapy, assisted living, skilled nursing facility or nursing home.   Discuss benefits of additional support referrals that may include  or , pain or palliative care specialist, hospice care and home healthcare.   Compassionately discuss advance care planning; if patient can make own decisions, encourage completion of advance directives and assist in identification of a proxy decision-maker.    Notes:            Task Due Date Last Modified     Facilitate Planning for Long-Term Care Needs --  2/7/2024  3:43 PM by Crystal Isaac RN     Care Management Activities:      - not discussed during this outreach      Notes:              Problem Priority Last Modified     Caregiver Coping (Stroke) --  2/7/2024  3:43 PM by Crystal Isaac RN              Goal Recent Progress Last Modified     Optimal Caregiver Coping --  2/7/2024  3:43 PM by Crystal Isaac RN     Evidence-based guidance:   Recognize and validate the impact of stroke on the caregiver and extended family; provide education and support to align with needs and degree of functional deficit.   Acknowledge feelings of grief, such as loss of relationship, recreational activities, future with patient and loss associated with potential out-of-home placement.   Encourage verbalization of feelings without ruminating.   Discuss services that may help balance the caregiving role and living the life he/she chooses, such as professionally or peer-led support groups, web-based support, counseling and respite care or in-home help.   Refer for or provide psychoeducation activities, such as cognitive reframing to improve family/caregiver quality of life, wellbeing, confidence, perception of burden, mental health and self-efficacy.   Encourage  use of coping strategies that may include yoga, spiritual support, distraction, exercise, relaxation, music, massage, music therapy and outdoor activities to utilize nature's restorative properties.   Provide proactive acknowledgement of potential for depressive symptoms to appear; normalize response to burden of caregiving; refer to primary care provider or mental health services.   Encourage caregiver-focused support that includes arranging respite, as well as managing caregiver's emotions, finances, transportation and self-care.   Provide skills training, such as lifting; mobility and adapted communication techniques; coping with cognitive, memory and perceptual changes; preparing and modifying food for dysphagia diets.    Notes:            Task Due Date Last Modified     Recognize and Manage Caregiver Strain --  2/7/2024  3:43 PM by Crystal Isaac RN     Care Management Activities:      - not discussed during this outreach      Notes:              Problem Priority Last Modified     Recurrence (Stroke) --  2/7/2024  3:43 PM by Crystal Isaac, CLAUDIO              Goal Recent Progress Last Modified     Stroke Recurrence Prevented or Minimized --  2/7/2024  3:43 PM by Crystal Isaac, CLAUDIO     Evidence-based guidance:   Establish baseline by comparing pre and poststroke level of function using age-appropriate criteria; consider history of transient ischemia attack.   Assess for sudden or new changes in attention, vision changes, language pattern, motor control and mobility, as well as facial drooping, unilateral weakness, confusion and fatigue that may indicate recurrence.   Promote long-term management of risk factors, such as tobacco use, sedentary lifestyle, obesity, sleep disordered breathing, unhealthy diet and high alcohol consumption.   Prepare patient for cardiac monitoring (invasive or noninvasive) when cause of stroke is unknown; consider ankle-brachial index measurement as a means of predicting recurrence risk.    Monitor and manage comorbidity, such as atrial fibrillation, hypertension, carotid artery disease, cardiomyopathy, heart valve disease, sickle cell disease, coronary artery disease, sleep disordered breathing or depression.   Provide anticipatory guidance to women of childbearing age regarding potential discontinuation of oral contraceptives and alternative family planning method.    Assess and address adherence to pharmacologic therapy that may include antihypertensive, antiplatelet, anticoagulant, statin, fibrate and vitamin supplement; monitor and manage side effects.   Consider providing or referring to group-based support and/or education program; assess for cognitive impairment that may impact ability to follow therapeutic regimen.   Prepare patient for periodic checking of anticoagulant or antiplatelet therapy effectiveness.    Notes:            Task Due Date Last Modified     Optimize Stroke Recovery --  2/7/2024  3:43 PM by Crystal Isaac RN     Care Management Activities:      - not discussed during this outreach      Notes:              Problem Priority Last Modified     Residual Deficits (Stroke) --  2/7/2024  3:43 PM by Crystal Isaac RN              Goal Recent Progress Last Modified     Residual Deficits Prevented or Minimized --  2/7/2024  3:43 PM by Crystal Isaac RN     Evidence-based guidance:   Evaluate changes in function that determine patient's rehabilitation plan, including pain, vision, basic and instrumental activities of daily living, motor control, upper and lower motor function, cognition and emotions.   Prepare patient for long-term (at least 6 months, 5 days per week) interprofessional rehabilitation based on tolerance and degree of functional limitation, beginning soon after stroke event.   Refer to rehabilitation therapy for assessment and individualized program that may include swallowing, functional task training, exercise, assistive device training, enhancement of self-care  ability and cardiorespiratory fitness.   Prepare patient for referral to speech language pathologist to assess and treat speech/language and swallowing deficits.   Note: Patient with persistent weight loss or recurrent infections should be urgently assessed and treated.   Promote optimal independence and self-efficacy.   Monitor and manage effects of pharmacologic therapy that may include serotonin selective reuptake inhibitor, anticholinergic, vitamin supplement, eugeroic (wakefulness-promoting agent) or bisphosphonate.   Screen and assess risk for malnutrition, micronutrient deficiency and dehydration; address tolerance to diet and assess adequacy of fluid intake.   If not able to meet nutrition or fluid requirements, consider recommendations of alternate route for nutrition, hydration and medication while considering quality of life and patient preferences.   Assess and address poststroke fatigue; consider regular exercise, sleep-hygiene practices, avoidance of sedating drugs and excessive alcohol, mindfulness, relaxation and pharmacologic therapy.    Promote regular exercise and physical activity focused on improving strength and cardio-respiratory function based on ability and tolerance.   Encourage appropriate vocational or educational counseling for reentering the community, workplace or school, including a driving evaluation.    Notes:            Task Due Date Last Modified     Optimize Functional Ability --  2/7/2024  3:43 PM by Crystal Isaac RN     Care Management Activities:      - not discussed during this outreach      Notes:              Problem Priority Last Modified     Harm or Injury (Stroke) --  2/7/2024  3:43 PM by Crystal Isaac RN              Goal Recent Progress Last Modified     Harm or Injury Prevented --  2/7/2024  3:43 PM by Crystal Isaac RN     Evidence-based guidance:   Address risk for personal injury, such as motor vehicle accidents, falls, self-harm due to medication, compromised  mobility, diminished perception, reluctance to accept change, reasoning, decision-making and judgment.   If patient wishes to continue driving and has residual motor, sensory or cognitive changes, refer for occupational therapy driving assessment.   Provide anticipatory guidance regarding risk of bleeding while taking an antiplatelet or anticoagulant.   Assess fall risk related to postural control, balance and gait impairment, muscle weakness, diminished vision and hearing, presence of incontinence, environmental hazards and effects of medication.   Address fall risk by considering single vision versus multifocal lenses (glasses) and antislip, flat-heeled shoes during activity, environmental modification and the use of orthoses and assistive devices.   Address potential barriers to activity or exercise, such as low self-efficacy and fear of falling.   Encourage exposure to sunlight with skin protection to maximize vitamin D synthesis.   Assess and manage bladder or bowel incontinence; add additional goals and interventions based on presentation.   Assess for pressure injury risk based on stroke severity, reduced mobility, diabetes diagnosis, presence of incontinence and nutritional status; provide guidance regarding pressure-relieving strategies and aids.   Engage family in closely monitoring changes in the patient's emotional state, such as negativity, hopelessness and suicidal ideation.   Explore suicidal tendencies compassionately, yet directly, by asking about suicidal ideation, attempt history and family history.   Maintain frequent, structured and supportive contact by phone, office or home visit; collaborate closely with behavioral health specialists and psychiatry.   Make immediate arrangements for evaluation at a local emergency department, community mental health agency or other psychiatric service when patient expresses positive suicidal ideation with a plan and access to lethal means.    Notes:             Task Due Date Last Modified     Identify and Reduce Risk to Safety --  2/7/2024  3:43 PM by Crystal Isaac RN     Care Management Activities:      - not discussed during this outreach      Notes:                   Urinary Incontinence (Adult)           Problem Priority Last Modified     Disorder Identification (Urinary Incontinence) --  2/7/2024  3:43 PM by Crystal Isaac RN              Goal Recent Progress Last Modified     Urinary Incontinence Identified --  2/7/2024  3:43 PM by Crystal Isaac RN     Evidence-based guidance:   Determine severity and nature of incontinence, such as stress, urge or mixed incontinence) to assist in definitive diagnosis.   Perform thorough medication review to determine potential triggers of incontinence, such as a diuretic, anticholinergic, narcotic, calcium channel blocker, sedative, nonprescription cold remedy, ace inhibitor or antiparkinsonian agent.   Prepare patient for laboratory and diagnostic testing based on signs/symptoms and risk factors.   Promote use of a voiding diary for at least 3 days of fluid intake time and amount, number of voids, incontinence and nocturia episodes, feelings of urgency, bladder emptiness and pain.    Notes:            Task Due Date Last Modified     Identify Presence and Type of Urinary Incontinence --  2/7/2024  3:43 PM by Crystal Isaac RN     Care Management Activities:      - not discussed during this outreach      Notes:              Problem Priority Last Modified     Symptom Management (Urinary Incontinence) --  2/7/2024  3:43 PM by Crystal Isaac RN              Goal Recent Progress Last Modified     Urinary Incontinence Symptoms Manged --  2/7/2024  3:43 PM by Crystal Isaac RN     Evidence-based guidance:   Address nonadherence to pharmacotherapy, such as muscarinic, due to side effects like abdominal pain, anorexia, dyspepsia, heartburn and gastroesophageal reflux disease.   Anticipate referral to physical therapy for evaluation and  treatment, such as pelvic floor muscle training alone or in combination with biofeedback or electrical stimulation.   Anticipate referral to urologist when incontinence persists longer than 12 weeks after the initiation of conservative treatment.   Consider referral to urologist (before 12 weeks) in presence of pain, hematuria, repeated urinary tract infection, suspected metabolic or neurologic disease.   Compare symptoms with impact on daily living and lifestyle risk factors, such as caffeine intake, smoking and obesity.   Prepare patient for use of alternative treatment options when initial management has failed, such as surgery, weighted vaginal cones, pessary, posterior tibial-sacral nerve stimulation, intravesical botulinum toxin or intermittent catheterization.   Prepare patient for use of pharmacologic therapy, such as topical estrogen, antimuscarinic, alpha-adrenergic blocker, alpha reductase inhibitor or duloxetine.   Promote the use of incontinence products based on degree of leakage, daytime versus nighttime use, cost and convenience.    Notes:            Task Due Date Last Modified     Alleviate Barriers to Incontinence Treatment --  2/7/2024  3:43 PM by Crystal Isaac RN     Care Management Activities:      - not discussed during this outreach      Notes:              Problem Priority Last Modified     Harm or Injury (Urinary Incontinence) --  2/7/2024  3:43 PM by Crystal Isaac RN              Goal Recent Progress Last Modified     Harm or Injury Prevented --  2/7/2024  3:43 PM by Crystal Isaac RN     Evidence-based guidance:   Promote a consistent skin care program after each episode of incontinence that includes thorough cleansing; avoid use of detergent soap and encourage use of emollients.   Assess fall risk related to balance and gait impairment, muscle weakness, diminished vision or hearing, environmental hazards, effects of medication and daytime sleepiness from nocturia.   Encourage strategies  to prevent urinary tract infection, such as prophylactic antibiotic therapy and vaginal estrogen.   Prevent falls with environmental adjustment; encourage compliance with exercise program, management of pain, as well as use of adaptive and assistive devices.    Notes:            Task Due Date Last Modified     Identify and Reduce Risk to Safety --  2/7/2024  3:43 PM by Crystal Isaac, RN     Care Management Activities:      - not discussed during this outreach      Notes:                        Instructions   Patient was provided an electronic copy of care plan  CCM services were explained and offered and patient has accepted these services.  Patient has given their written consent to receive CCM services and understands that this includes the authorization of electronic communication of medical information with the other treating providers.  Patient understands that they may stop CCM services at any time and these changes will be effective at the end of the calendar month and will effectively revocate the agreement of CCM services.  Patient understands that only one practitioner can furnish and be paid for CCM services during one calendar month.  Patient also understands that there may be co-payment or deductible fees in association with CCM services.  Patient will continue with at least monthly follow-up calls with the Ambulatory .    Crystal DELEON  Ambulatory Case Management    2/29/2024, 11:11 EST

## 2024-02-29 NOTE — TELEPHONE ENCOUNTER
Caller: Brooke Santoro ([facility])    Relationship: Emergency Contact    INVALID [facility] VERBAL RELEASE NOTHING IS CHECKED OFF.     Best call back number:  [phone]    What is the best time to reach you: ANYTIME    Who are you requesting to speak with (clinical staff, provider,  specific staff member): DR DUKE STAFF        What was the call regarding:     WANTED TO SEE IF PAPITO CAN HAVE HIS LAB ORDERS DONE THROUGH [facility] , WHICH IS CURRENTLY SEEING HIM .

## 2024-02-29 NOTE — TELEPHONE ENCOUNTER
Returned call to pt's wife. She states pt cannot come in tomorrow as it is very difficult to get him out of the house at this time. Offered video visit and wife declined. She would like for labs to be drawn by [facility]. Pt had labs done in ER last week. These were reviewed with Dr. Lopez. Counts stable. Dr. Lopez would like to try to reschedule to 1 month from now. Pt's wife was agreeable to this. Message sent to scheduling to reschedule pt.

## 2024-02-29 NOTE — HOME HEALTH
SN obained a urine sample for a UA ordered by Dr. Chris Sanchez.  Patient c/o of burning and pain when he urinates.  No new issues noted.  Patient c/o weakness, SN explained different ways to help with strength.  SN will watch for results, if positive will continue due to UTI

## 2024-03-01 ENCOUNTER — HOME CARE VISIT (OUTPATIENT)
Dept: HOME HEALTH SERVICES | Facility: HOME HEALTHCARE | Age: 79
End: 2024-03-01
Payer: MEDICARE

## 2024-03-01 PROCEDURE — G0151 HHCP-SERV OF PT,EA 15 MIN: HCPCS

## 2024-03-04 VITALS
HEART RATE: 64 BPM | RESPIRATION RATE: 17 BRPM | DIASTOLIC BLOOD PRESSURE: 64 MMHG | TEMPERATURE: 97.4 F | OXYGEN SATURATION: 96 % | SYSTOLIC BLOOD PRESSURE: 128 MMHG

## 2024-03-05 ENCOUNTER — HOSPITAL ENCOUNTER (OUTPATIENT)
Dept: CARDIOLOGY | Facility: HOSPITAL | Age: 79
Discharge: HOME | End: 2024-03-05
Admitting: INTERNAL MEDICINE
Payer: MEDICARE

## 2024-03-05 ENCOUNTER — HOME CARE VISIT (OUTPATIENT)
Dept: HOME HEALTH SERVICES | Facility: HOME HEALTHCARE | Age: 79
End: 2024-03-05
Payer: MEDICARE

## 2024-03-05 VITALS — HEIGHT: 68 IN | WEIGHT: 160.05 LBS | BODY MASS INDEX: 24.26 KG/M2

## 2024-03-05 DIAGNOSIS — R60.0 EDEMA LEG: ICD-10-CM

## 2024-03-05 LAB
AORTIC DIMENSIONLESS INDEX: 0.8 (DI)
ASCENDING AORTA: 3.5 CM
BH CV ECHO MEAS - ACS: 1.71 CM
BH CV ECHO MEAS - AO MAX PG: 7 MMHG
BH CV ECHO MEAS - AO MEAN PG: 4 MMHG
BH CV ECHO MEAS - AO V2 MAX: 132 CM/SEC
BH CV ECHO MEAS - AO V2 VTI: 25.2 CM
BH CV ECHO MEAS - AVA(I,D): 2.09 CM2
BH CV ECHO MEAS - EDV(CUBED): 64.1 ML
BH CV ECHO MEAS - EDV(MOD-SP4): 48 ML
BH CV ECHO MEAS - EF(MOD-SP4): 60.4 %
BH CV ECHO MEAS - ESV(CUBED): 26.5 ML
BH CV ECHO MEAS - ESV(MOD-SP4): 19 ML
BH CV ECHO MEAS - FS: 25.5 %
BH CV ECHO MEAS - IVS/LVPW: 1.01 CM
BH CV ECHO MEAS - IVSD: 1 CM
BH CV ECHO MEAS - LAT PEAK E' VEL: 8.7 CM/SEC
BH CV ECHO MEAS - LV DIASTOLIC VOL/BSA (35-75): 25.8 CM2
BH CV ECHO MEAS - LV MASS(C)D: 127.4 GRAMS
BH CV ECHO MEAS - LV MAX PG: 4.2 MMHG
BH CV ECHO MEAS - LV MEAN PG: 2 MMHG
BH CV ECHO MEAS - LV SYSTOLIC VOL/BSA (12-30): 10.2 CM2
BH CV ECHO MEAS - LV V1 MAX: 102 CM/SEC
BH CV ECHO MEAS - LV V1 VTI: 19.6 CM
BH CV ECHO MEAS - LVIDD: 4 CM
BH CV ECHO MEAS - LVIDS: 3 CM
BH CV ECHO MEAS - LVOT AREA: 2.7 CM2
BH CV ECHO MEAS - LVOT DIAM: 1.85 CM
BH CV ECHO MEAS - LVPWD: 1 CM
BH CV ECHO MEAS - MED PEAK E' VEL: 5.2 CM/SEC
BH CV ECHO MEAS - MV A DUR: 0.14 SEC
BH CV ECHO MEAS - MV A MAX VEL: 100.7 CM/SEC
BH CV ECHO MEAS - MV DEC SLOPE: 347.9 CM/SEC2
BH CV ECHO MEAS - MV DEC TIME: 0.21 SEC
BH CV ECHO MEAS - MV E MAX VEL: 72.8 CM/SEC
BH CV ECHO MEAS - MV E/A: 0.72
BH CV ECHO MEAS - MV MAX PG: 7.5 MMHG
BH CV ECHO MEAS - MV MEAN PG: 2.26 MMHG
BH CV ECHO MEAS - MV P1/2T: 76.6 MSEC
BH CV ECHO MEAS - MV V2 VTI: 31.4 CM
BH CV ECHO MEAS - MVA(P1/2T): 2.9 CM2
BH CV ECHO MEAS - MVA(VTI): 1.68 CM2
BH CV ECHO MEAS - PA ACC TIME: 0.1 SEC
BH CV ECHO MEAS - PA V2 MAX: 117 CM/SEC
BH CV ECHO MEAS - PULM A REVS DUR: 0.08 SEC
BH CV ECHO MEAS - PULM A REVS VEL: 24.9 CM/SEC
BH CV ECHO MEAS - PULM DIAS VEL: 42 CM/SEC
BH CV ECHO MEAS - PULM S/D: 1.24
BH CV ECHO MEAS - PULM SYS VEL: 52.3 CM/SEC
BH CV ECHO MEAS - QP/QS: 0.8
BH CV ECHO MEAS - RAP SYSTOLE: 8 MMHG
BH CV ECHO MEAS - RV MAX PG: 3.6 MMHG
BH CV ECHO MEAS - RV V1 MAX: 94.7 CM/SEC
BH CV ECHO MEAS - RV V1 VTI: 18.9 CM
BH CV ECHO MEAS - RVOT DIAM: 1.68 CM
BH CV ECHO MEAS - RVSP: 23.5 MMHG
BH CV ECHO MEAS - SI(MOD-SP4): 15.6 ML/M2
BH CV ECHO MEAS - SV(LVOT): 52.8 ML
BH CV ECHO MEAS - SV(MOD-SP4): 29 ML
BH CV ECHO MEAS - SV(RVOT): 42 ML
BH CV ECHO MEAS - TAPSE (>1.6): 1.8 CM
BH CV ECHO MEAS - TR MAX PG: 15.5 MMHG
BH CV ECHO MEAS - TR MAX VEL: 196.8 CM/SEC
BH CV ECHO MEASUREMENTS AVERAGE E/E' RATIO: 10.47
BH CV XLRA - RV BASE: 3.5 CM
BH CV XLRA - RV LENGTH: 6 CM
BH CV XLRA - RV MID: 3.3 CM
BH CV XLRA - TDI S': 11.9 CM/SEC
LEFT ATRIUM VOLUME INDEX: 23.5 ML/M2
SINUS: 3 CM
STJ: 2.8 CM

## 2024-03-05 PROCEDURE — 93306 TTE W/DOPPLER COMPLETE: CPT

## 2024-03-05 PROCEDURE — 93306 TTE W/DOPPLER COMPLETE: CPT | Performed by: INTERNAL MEDICINE

## 2024-03-06 ENCOUNTER — TELEPHONE (OUTPATIENT)
Dept: CARDIOLOGY | Facility: CLINIC | Age: 79
End: 2024-03-06
Payer: MEDICARE

## 2024-03-06 ENCOUNTER — HOME CARE VISIT (OUTPATIENT)
Dept: HOME HEALTH SERVICES | Facility: HOME HEALTHCARE | Age: 79
End: 2024-03-06
Payer: MEDICARE

## 2024-03-06 ENCOUNTER — TELEPHONE (OUTPATIENT)
Dept: FAMILY MEDICINE CLINIC | Facility: CLINIC | Age: 79
End: 2024-03-06

## 2024-03-06 VITALS
OXYGEN SATURATION: 98 % | RESPIRATION RATE: 20 BRPM | HEART RATE: 78 BPM | TEMPERATURE: 98.6 F | SYSTOLIC BLOOD PRESSURE: 150 MMHG | DIASTOLIC BLOOD PRESSURE: 78 MMHG

## 2024-03-06 DIAGNOSIS — J90 PLEURAL EFFUSION: Primary | ICD-10-CM

## 2024-03-06 DIAGNOSIS — I50.9 PLEURAL EFFUSION DUE TO CHF (CONGESTIVE HEART FAILURE): Primary | ICD-10-CM

## 2024-03-06 PROCEDURE — G0299 HHS/HOSPICE OF RN EA 15 MIN: HCPCS

## 2024-03-06 NOTE — TELEPHONE ENCOUNTER
Nona with Home Health called because she is at the patient's house and they were wondering about the patient's echo results. Apparently there is note of new large left sided pleural effusion. Family is now concerned and wanting further recommendations.     Libertad Ventura RN  Triage [facility] [place]

## 2024-03-06 NOTE — TELEPHONE ENCOUNTER
I called spoke with wife.  I ordered a chest x-ray that needs to be done.  Patient also needs an appointment to see me or Erin here in the next few weeks.

## 2024-03-06 NOTE — TELEPHONE ENCOUNTER
Caller: PEDRO HAI/[facility]      Best call back number: [phone]*    What was the call regarding: PEDRO WITH [facility] CALLING STATING THAT SHE WAS REVIEWING ECHO THAT CARDIOLOGY ORDER, LOOKS LIKE THE PATIENT HAS A LEFT PLURAL EFFUSION WHICH IS NEW.  PEDRO ALSO STATED THAT THE PATIENT REPORTED TAKING A FALL ON MONDAY NIGHT AND ADVISED THAT HE HAS NO INJURIES. PEDRO STATES THAT A URINE ANALYSIS  AND URINE CULTURE WAS COLLECTED A WEEK AGO ON 2/28, AND THE PATIENT IS STILL COMPLAINING OF BURNING WHILE URINATING. THE PATIENT SCHEDULED AN APPOINTMENT WITH DR. CHAPPELL (ONLY WANTED TO SEE DR. CHAPPELL) FOR 3/12, TO ADDRESS THE URINARY ISSUES AND TO FOLLOW UP ON THE URINE CULTURE.

## 2024-03-06 NOTE — Clinical Note
I have placed another visit for next week to see patient, as there has been a change with edema in legs, fall on Monday, and results on echo show a new pleural effusions

## 2024-03-07 ENCOUNTER — HOME CARE VISIT (OUTPATIENT)
Dept: HOME HEALTH SERVICES | Facility: HOME HEALTHCARE | Age: 79
End: 2024-03-07
Payer: MEDICARE

## 2024-03-07 PROCEDURE — G0152 HHCP-SERV OF OT,EA 15 MIN: HCPCS

## 2024-03-07 RX ORDER — ROSUVASTATIN CALCIUM 5 MG/1
5 TABLET, COATED ORAL
Qty: 30 TABLET | OUTPATIENT
Start: 2024-03-07

## 2024-03-07 RX ORDER — FOLIC ACID 1 MG/1
1000 TABLET ORAL DAILY
Qty: 30 TABLET | OUTPATIENT
Start: 2024-03-07

## 2024-03-07 RX ORDER — AMLODIPINE BESYLATE 5 MG/1
5 TABLET ORAL DAILY
Qty: 30 TABLET | OUTPATIENT
Start: 2024-03-07

## 2024-03-07 NOTE — TELEPHONE ENCOUNTER
Left message with Nona [facility]  [phone] -ok for the hub to relay message     Review of the information shows that Dr. Jordan has seen the pleural effusion on the echo and is ordered a chest x-ray.  I went ahead and added to that by ordering a pulmonary consult.  Let patient know the be called about that appointment.  Regarding the urinary symptoms, I do not believe I know the story in that regard and I would need more information to see the patient to know that.  It is not going to be for another 6 days that he can get in here though, I would recommend that he be seen in urgent care for evaluation now, in case this were an early UTI and he needs early treatment.

## 2024-03-08 VITALS
HEART RATE: 81 BPM | SYSTOLIC BLOOD PRESSURE: 138 MMHG | DIASTOLIC BLOOD PRESSURE: 60 MMHG | RESPIRATION RATE: 18 BRPM | TEMPERATURE: 97.7 F | OXYGEN SATURATION: 96 %

## 2024-03-08 NOTE — TELEPHONE ENCOUNTER
03/08/2024     Called and left a voice mail for patient to schedule an appointment.    Thanks, Tea

## 2024-03-08 NOTE — HOME HEALTH
"SUBJECTIVE: Wife; \"He had an echocardiogram. It showed a large left pleural effusion. So they are going to do an x-ray, but it's not scheduled yet. They want him to see a Pulmonary doctor too. His legs are so swollen, finally the doctors are listening to use. That's his whole problem; his swollen legs making it so he can't walk. He's just not able to do therapy right now, so today you can discharge.\""

## 2024-03-08 NOTE — HOME HEALTH
Routine Visit Note:    Skill/education provided: patient echo showed pleural effusion that is new from the chest xray completed x1 week ago, pt denies SOA at this time he does report that leg swelling has increased. Cardiology and PCP called and notified of results to review, patient reports burning with urination at times, PCP will see patient on Tuesday to f/u on UA results.     Plan for next visit: review office visit with PCP, and f/u on test results from chest x ray

## 2024-03-09 ENCOUNTER — HOSPITAL ENCOUNTER (OUTPATIENT)
Dept: GENERAL RADIOLOGY | Facility: HOSPITAL | Age: 79
Discharge: HOME | End: 2024-03-09
Payer: MEDICARE

## 2024-03-09 DIAGNOSIS — I50.9 PLEURAL EFFUSION DUE TO CHF (CONGESTIVE HEART FAILURE): ICD-10-CM

## 2024-03-09 PROCEDURE — 71046 X-RAY EXAM CHEST 2 VIEWS: CPT

## 2024-03-11 ENCOUNTER — TELEPHONE (OUTPATIENT)
Dept: CARDIOLOGY | Facility: CLINIC | Age: 79
End: 2024-03-11
Payer: MEDICARE

## 2024-03-11 NOTE — PROGRESS NOTES
"Subjective   Madhu Santoro is a 78 y.o. male.     CC: Burning with Urination    History of Present Illness     Patient with significant past medical history, including a prolonged hospitalization earlier this year, comes in reporting issues with increased burning with urination for the last 2 weeks.  Denies fever/chills/nausea/vomiting/diarrhea.    Patient also has chronic lymphedema of the legs since his hospitalization, and has seen cardiology just recently and is not a candidate for Lasix at this time.  Patient and his wife both report that the swelling is impeding his getting up and being mobile and getting stronger in his recovery.      The following portions of the patient's history were reviewed and updated as appropriate: allergies, current medications, past family history, past medical history, past social history, past surgical history, and problem list.    Review of Systems   Constitutional:  Negative for activity change, chills and fever.   Respiratory:  Negative for cough.    Cardiovascular:  Positive for leg swelling. Negative for chest pain.   Genitourinary:  Positive for dysuria.   Psychiatric/Behavioral:  Negative for dysphoric mood.        /75   Pulse 78   Temp 97.6 °F (36.4 °C) (Oral)   Resp 16   Ht 172.7 cm (68\")   Wt 70.3 kg (155 lb)   SpO2 99%   BMI 23.57 kg/m²     Objective   Physical Exam  Constitutional:       General: He is not in acute distress.     Appearance: He is well-developed.   Pulmonary:      Effort: Pulmonary effort is normal.   Musculoskeletal:      Right lower le+ Pitting Edema present.      Left lower le+ Pitting Edema present.   Neurological:      Mental Status: He is alert and oriented to person, place, and time.   Psychiatric:         Behavior: Behavior normal.         Thought Content: Thought content normal.         Assessment & Plan   Diagnoses and all orders for this visit:    1. Prostatitis, unspecified prostatitis type (Primary)  -     " ciprofloxacin (Cipro) 250 MG tablet; Take 1 tablet by mouth 2 (Two) Times a Day.  Dispense: 20 tablet; Refill: 0  -     Ambulatory Referral to Urology    2. Chronic acquired lymphedema  -     Ambulatory Referral to Lymphedema Clinic

## 2024-03-11 NOTE — TELEPHONE ENCOUNTER
Caller: Brooke Santoro ([facility])    Relationship to patient: Emergency Contact    Best call back number: [phone]    Type of visit: FOLLOW UP AFTER ECHO AND CHEST XRAY    Requested date: ASAP, EARLY AFTERNOON PREFERRED AT [facility]      If rescheduling, when is the original appointment: PT'S NEXT APPT IS NOT UNTIL AUGUST.     Additional notes: PT'S WIFE CALLED IN REQUESTING AN APPT AFTER PT HAD ECHO AND CHEST XRAY, ASKED FOR ASAP AT [facility], PLEASE ADVISE.

## 2024-03-12 ENCOUNTER — OFFICE VISIT (OUTPATIENT)
Dept: CARDIOLOGY | Facility: CLINIC | Age: 79
End: 2024-03-12
Payer: MEDICARE

## 2024-03-12 ENCOUNTER — OFFICE VISIT (OUTPATIENT)
Dept: FAMILY MEDICINE CLINIC | Facility: CLINIC | Age: 79
End: 2024-03-12
Payer: MEDICARE

## 2024-03-12 VITALS
TEMPERATURE: 97.6 F | OXYGEN SATURATION: 99 % | DIASTOLIC BLOOD PRESSURE: 75 MMHG | BODY MASS INDEX: 23.49 KG/M2 | RESPIRATION RATE: 16 BRPM | SYSTOLIC BLOOD PRESSURE: 146 MMHG | HEART RATE: 78 BPM | HEIGHT: 68 IN | WEIGHT: 155 LBS

## 2024-03-12 VITALS
WEIGHT: 157 LBS | HEART RATE: 71 BPM | HEIGHT: 68 IN | SYSTOLIC BLOOD PRESSURE: 138 MMHG | BODY MASS INDEX: 23.79 KG/M2 | DIASTOLIC BLOOD PRESSURE: 72 MMHG | OXYGEN SATURATION: 95 %

## 2024-03-12 DIAGNOSIS — I95.1 ORTHOSTATIC HYPOTENSION: ICD-10-CM

## 2024-03-12 DIAGNOSIS — I25.83 CORONARY ARTERY DISEASE DUE TO LIPID RICH PLAQUE: Primary | ICD-10-CM

## 2024-03-12 DIAGNOSIS — Z95.1 S/P CABG (CORONARY ARTERY BYPASS GRAFT): ICD-10-CM

## 2024-03-12 DIAGNOSIS — Z86.79 S/P ABLATION OF ATRIAL FLUTTER: ICD-10-CM

## 2024-03-12 DIAGNOSIS — I25.10 CORONARY ARTERY DISEASE DUE TO LIPID RICH PLAQUE: Primary | ICD-10-CM

## 2024-03-12 DIAGNOSIS — N41.9 PROSTATITIS, UNSPECIFIED PROSTATITIS TYPE: Primary | ICD-10-CM

## 2024-03-12 DIAGNOSIS — I89.0 CHRONIC ACQUIRED LYMPHEDEMA: ICD-10-CM

## 2024-03-12 DIAGNOSIS — Z98.890 S/P ABLATION OF ATRIAL FLUTTER: ICD-10-CM

## 2024-03-12 PROCEDURE — G2211 COMPLEX E/M VISIT ADD ON: HCPCS | Performed by: INTERNAL MEDICINE

## 2024-03-12 PROCEDURE — 1160F RVW MEDS BY RX/DR IN RCRD: CPT | Performed by: FAMILY MEDICINE

## 2024-03-12 PROCEDURE — 3075F SYST BP GE 130 - 139MM HG: CPT | Performed by: INTERNAL MEDICINE

## 2024-03-12 PROCEDURE — 1159F MED LIST DOCD IN RCRD: CPT | Performed by: FAMILY MEDICINE

## 2024-03-12 PROCEDURE — 99214 OFFICE O/P EST MOD 30 MIN: CPT | Performed by: INTERNAL MEDICINE

## 2024-03-12 PROCEDURE — 3077F SYST BP >= 140 MM HG: CPT | Performed by: FAMILY MEDICINE

## 2024-03-12 PROCEDURE — 3078F DIAST BP <80 MM HG: CPT | Performed by: FAMILY MEDICINE

## 2024-03-12 PROCEDURE — 99213 OFFICE O/P EST LOW 20 MIN: CPT | Performed by: FAMILY MEDICINE

## 2024-03-12 PROCEDURE — 3078F DIAST BP <80 MM HG: CPT | Performed by: INTERNAL MEDICINE

## 2024-03-12 RX ORDER — LISINOPRIL 5 MG/1
5 TABLET ORAL DAILY
Qty: 30 TABLET | Refills: 11 | Status: SHIPPED | OUTPATIENT
Start: 2024-03-12

## 2024-03-12 RX ORDER — CIPROFLOXACIN 250 MG/1
250 TABLET, FILM COATED ORAL
Qty: 20 TABLET | Refills: 0 | Status: SHIPPED | OUTPATIENT
Start: 2024-03-12

## 2024-03-12 RX ORDER — METOCLOPRAMIDE HYDROCHLORIDE 5 MG/5ML
5 SOLUTION ORAL
COMMUNITY
Start: 2024-02-07

## 2024-03-12 NOTE — PROGRESS NOTES
"      CARDIOLOGY    Massimo Jordan MD    ENCOUNTER DATE:  03/12/2024    Madhu Santoro / 78 y.o. / male        CHIEF COMPLAINT / REASON FOR OFFICE VISIT     edema      HISTORY OF PRESENT ILLNESS       HPI  Madhu Santoro is a 78 y.o. male who presents today for reevaluation.  Patient unfortunately continues to have lower extremity edema.  Patient becomes more mobile here mobile.  He says his legs feel like weights because he is got the swelling.  His swelling actually dimples up into his thigh area.  I am going to stop his Norvasc altogether I think it could be contributing.  He is got to wear compression hoses.      The following portions of the patient's history were reviewed and updated as appropriate: allergies, current medications, past family history, past medical history, past social history, past surgical history and problem list.      VITAL SIGNS     Visit Vitals  /72 (BP Location: Left arm)   Pulse 71   Ht 172.7 cm (68\")   Wt 71.2 kg (157 lb)   SpO2 95%   BMI 23.87 kg/m²         Wt Readings from Last 3 Encounters:   03/12/24 70.3 kg (155 lb)   03/12/24 71.2 kg (157 lb)   03/05/24 72.6 kg (160 lb 0.9 oz)     Body mass index is 23.87 kg/m².      REVIEW OF SYSTEMS   ROS        PHYSICAL EXAMINATION     Vitals reviewed.   Cardiovascular:      Normal rate. Regular rhythm. Normal S1. Normal S2.       Murmurs: There is no murmur.      No gallop.  No click. No rub.   Pulses:     Intact distal pulses.   Edema:     Peripheral edema present.     Thigh: bilateral pitting edema of the thigh.     Pretibial: bilateral pitting edema of the pretibial area.     Ankle: bilateral pitting edema of the ankle.     Feet: bilateral pitting edema of the feet.          REVIEWED DATA     Procedures    Cardiac Procedures:      Lipid Panel          7/3/2023    08:51   Lipid Panel   Total Cholesterol 113    Triglycerides 83    HDL Cholesterol 38    VLDL Cholesterol 17    LDL Cholesterol  58          ASSESSMENT & PLAN      " Diagnosis Plan   1. Coronary artery disease due to lipid rich plaque        2. Orthostatic hypotension        3. S/P CABG (coronary artery bypass graft)        4. S/P ablation of atrial flutter              SUMMARY/DISCUSSION  Patient with persistent lower extremity edema is causing immobility and or contributing to it.  Unfortunately due to his orthostatic hypotension I am very hesitant to place him on diuretics.  I did stop his amlodipine altogether.  He has got to where his compression hoses.  Hypertension I did add Norvasc to his regimen.  Coronary artery disease status post coronary artery bypass grafting he remains asymptomatic.  Follow-up 6 weeks for reassessment we will see how he is doing.  Will continue to provide longitudinal care for above issues.        MEDICATIONS         Discharge Medications            Accurate as of March 12, 2024  3:33 PM. If you have any questions, ask your nurse or doctor.                New Medications        Instructions Start Date   ciprofloxacin 250 MG tablet  Commonly known as: Cipro  Started by: Damian Sanchez MD   250 mg, Oral, 2 Times Daily - RT      lisinopril 5 MG tablet  Commonly known as: PRINIVIL,ZESTRIL  Started by: Massimo Jordan MD   5 mg, Oral, Daily             Continue These Medications        Instructions Start Date   allopurinol 300 MG tablet  Commonly known as: ZYLOPRIM   1 tablet, Oral, Daily      amitriptyline 50 MG tablet  Commonly known as: ELAVIL   50 mg, Per G Tube, Nightly      apixaban 2.5 MG tablet tablet  Commonly known as: ELIQUIS   2.5 mg, Oral, Every 12 Hours Scheduled      Carafate 1 g tablet  Generic drug: sucralfate   1 g, Oral, Daily, Patient only takes 1 time a day      folic acid 1 MG tablet  Commonly known as: FOLVITE   1 mg, Oral, Daily      HYDROcodone-acetaminophen 7.5-325 MG per tablet  Commonly known as: Norco   1 tablet, Oral, 4 Times Daily PRN, Hold if sedation      ipratropium-albuterol 0.5-2.5 mg/3 ml nebulizer  Commonly  known as: DUO-NEB   3 mL, Nebulization, Every 4 Hours PRN      metoclopramide 5 MG/5ML solution  Commonly known as: REGLAN   5 mg, Oral      ondansetron 4 MG tablet  Commonly known as: ZOFRAN   1 tablet, Oral, Every 8 Hours PRN      OSMOLITE 1.5 GIANNI PO   240 Units/oz/day, Gastrostomy Tube, 6 Times Daily      RABEprazole 20 MG EC tablet  Commonly known as: ACIPHEX   20 mg, Oral, Daily      rosuvastatin 20 MG tablet  Commonly known as: Crestor   20 mg, Per G Tube, Nightly             Stop These Medications      amLODIPine 5 MG tablet  Commonly known as: NORVASC  Stopped by: Massimo Jordan MD                  **Dragon Disclaimer:   Much of this encounter note is an electronic transcription/translation of spoken language to printed text. The electronic translation of spoken language may permit erroneous, or at times, nonsensical words or phrases to be inadvertently transcribed. Although I have reviewed the note for such errors, some may still exist.

## 2024-03-14 ENCOUNTER — HOME CARE VISIT (OUTPATIENT)
Dept: HOME HEALTH SERVICES | Facility: HOME HEALTHCARE | Age: 79
End: 2024-03-14
Payer: MEDICARE

## 2024-03-14 VITALS
DIASTOLIC BLOOD PRESSURE: 60 MMHG | OXYGEN SATURATION: 100 % | HEART RATE: 78 BPM | TEMPERATURE: 98.4 F | SYSTOLIC BLOOD PRESSURE: 138 MMHG | RESPIRATION RATE: 18 BRPM

## 2024-03-14 PROCEDURE — G0299 HHS/HOSPICE OF RN EA 15 MIN: HCPCS

## 2024-03-14 NOTE — Clinical Note
Patient has been discharged from [facility].  Thank you   Heather SNYDER   [phone]

## 2024-03-15 NOTE — HOME HEALTH
Discharge Decision:      Plan for discharge: Patient and wife understand all teachings, patient reports that he is started on antibiotic due to burning with urination, but he has had multiple UA's completed with no signs of UTI. PCP placed patient on antibiotic. BP medication has been changed and will follow up with cardiology and PCP for further needs at this time. All teachings patient and wife understand.     Ongoing needs: Tube feeding site care wife understands teachings.     Any referrals needed: Lymphedema Clinic and [facility]     Any declines in outcomes: discuss and document reason/Order received to discharge without goals met? ALL GOALS MET     Teaching needed prior to discharge: wear stockings as cardiology and PCP recommended

## 2024-04-08 ENCOUNTER — TELEPHONE (OUTPATIENT)
Dept: PAIN MEDICINE | Facility: CLINIC | Age: 79
End: 2024-04-08

## 2024-04-12 ENCOUNTER — OFFICE VISIT (OUTPATIENT)
Dept: ONCOLOGY | Facility: CLINIC | Age: 79
End: 2024-04-12
Payer: MEDICARE

## 2024-04-12 ENCOUNTER — LAB (OUTPATIENT)
Dept: LAB | Facility: HOSPITAL | Age: 79
End: 2024-04-12
Payer: MEDICARE

## 2024-04-12 VITALS
SYSTOLIC BLOOD PRESSURE: 118 MMHG | OXYGEN SATURATION: 97 % | RESPIRATION RATE: 18 BRPM | TEMPERATURE: 96.9 F | HEIGHT: 68 IN | WEIGHT: 155.6 LBS | DIASTOLIC BLOOD PRESSURE: 67 MMHG | BODY MASS INDEX: 23.58 KG/M2 | HEART RATE: 72 BPM

## 2024-04-12 DIAGNOSIS — D45 POLYCYTHEMIA VERA: ICD-10-CM

## 2024-04-12 DIAGNOSIS — D45 POLYCYTHEMIA VERA: Primary | ICD-10-CM

## 2024-04-12 LAB
BASOPHILS # BLD AUTO: 0.12 10*3/MM3 (ref 0–0.2)
BASOPHILS NFR BLD AUTO: 0.6 % (ref 0–1.5)
DEPRECATED RDW RBC AUTO: 50.4 FL (ref 37–54)
EOSINOPHIL # BLD AUTO: 1.23 10*3/MM3 (ref 0–0.4)
EOSINOPHIL NFR BLD AUTO: 5.9 % (ref 0.3–6.2)
ERYTHROCYTE [DISTWIDTH] IN BLOOD BY AUTOMATED COUNT: 20.1 % (ref 12.3–15.4)
HCT VFR BLD AUTO: 44.5 % (ref 37.5–51)
HGB BLD-MCNC: 13.2 G/DL (ref 13–17.7)
IMM GRANULOCYTES # BLD AUTO: 0.44 10*3/MM3 (ref 0–0.05)
IMM GRANULOCYTES NFR BLD AUTO: 2.1 % (ref 0–0.5)
LYMPHOCYTES # BLD AUTO: 1.95 10*3/MM3 (ref 0.7–3.1)
LYMPHOCYTES NFR BLD AUTO: 9.4 % (ref 19.6–45.3)
MCH RBC QN AUTO: 22 PG (ref 26.6–33)
MCHC RBC AUTO-ENTMCNC: 29.7 G/DL (ref 31.5–35.7)
MCV RBC AUTO: 74.3 FL (ref 79–97)
MONOCYTES # BLD AUTO: 2.15 10*3/MM3 (ref 0.1–0.9)
MONOCYTES NFR BLD AUTO: 10.4 % (ref 5–12)
NEUTROPHILS NFR BLD AUTO: 14.79 10*3/MM3 (ref 1.7–7)
NEUTROPHILS NFR BLD AUTO: 71.6 % (ref 42.7–76)
NRBC BLD AUTO-RTO: 0 /100 WBC (ref 0–0.2)
PLATELET # BLD AUTO: 688 10*3/MM3 (ref 140–450)
PMV BLD AUTO: 9.8 FL (ref 6–12)
RBC # BLD AUTO: 5.99 10*6/MM3 (ref 4.14–5.8)
WBC NRBC COR # BLD AUTO: 20.68 10*3/MM3 (ref 3.4–10.8)

## 2024-04-12 PROCEDURE — 3074F SYST BP LT 130 MM HG: CPT | Performed by: INTERNAL MEDICINE

## 2024-04-12 PROCEDURE — 99214 OFFICE O/P EST MOD 30 MIN: CPT | Performed by: INTERNAL MEDICINE

## 2024-04-12 PROCEDURE — 1126F AMNT PAIN NOTED NONE PRSNT: CPT | Performed by: INTERNAL MEDICINE

## 2024-04-12 PROCEDURE — 36415 COLL VENOUS BLD VENIPUNCTURE: CPT

## 2024-04-12 PROCEDURE — 85025 COMPLETE CBC W/AUTO DIFF WBC: CPT

## 2024-04-12 PROCEDURE — 3078F DIAST BP <80 MM HG: CPT | Performed by: INTERNAL MEDICINE

## 2024-04-12 NOTE — LETTER
April 12, 2024     Damian Sanchez MD  [id number] [facility]  [address]  [facility] [address]    Patient: Madhu Santoro   YOB: 1945   Date of Visit: 4/12/2024     Dear Damian Sanchez MD:       Thank you for referring Madhu Santoro to me for evaluation. Below are the relevant portions of my assessment and plan of care.    If you have questions, please do not hesitate to call me. I look forward to following Madhu along with you.         Sincerely,        Aquiles Lopez MD        CC: No Recipients    Aquiles Lopez MD  04/12/24 5177  Sign when Signing Visit        REASON FOR FOLLOW-UP: Polycythemia vera, JAK2 mutation detected    HISTORY OF PRESENT ILLNESS:  Mr. Santoro is a 78 y.o. male with the above-mentioned history, who returns to the office after complications following COVID development late last year.  He has been hospitalized several times, and rehab several times and had evidence hemolysis leading to discontinuance of his Hydrea.  He was last seen in mid January stable hematologically.  Unfortunately we did not have additional recommendations as he tried to recover from the infection.        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  Hematologically, fortunately, he remained stable and is off Hydrea.                                                                                                                           Hematologic/oncologic history:     The patient is a 78-year-old male followed with a number of issues including coronary artery disease, status post CABG, atrial flutter, previous CVA hyperlipidemia, GE reflux, carotid vascular disease, and hypertension.     He had been seen by vascular 2/25/2022 with mild progression of carotid disease but otherwise stable and also had follow-up with pain management for back pain 3/28/2022  requiring chronic narcotic therapy.  Patient has been resistant to epidural like procedures.     Unfortunately has had concurrent constipation requiring laxatives and additional opioid antagonist to reduce GI hypomotility.  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     Patient recently reviewed again by pain management with worsening pain.  Patient also saw GI periodically undergoing a follow-up MRI of the abdomen 8/9/2022 with scattered pancreatic cystic lesions unchanged from previous.  He had previously undergone EGD and colonoscopy 11/5/2021 with irregular Z-line and biopsies taken in nonbleeding internal hemorrhoids found during retroflexion that were small.  There are scattered small and large mouth diverticula found in the sigmoid colon descending colon and splenic flexure.       The patient now presents for thrombocytosis with review of his record over the last year demonstrating a platelet count that is escalated from 3-400,000 now to 8/11/2022 648,000 but concurrent microcytic and hypochromic indices.  These indices have been slowly reducing over the last 2 years approximately followed more closely.        These findings are discussed with the patient 8/16/2022 who indicates that he actually feels about the same though still has issues with back pain.  He is noted no change in bowel habit including, fortunately, improvement of his constipation without the use of additional medication such as Movantik.  He has not noted any change in the color of his stool including dark stools or any blood per rectum, urine though he does note periodic excess bruising from his anticoagulation therapy.       The patient moved to a trial of iron which, unfortunately, worsened his constipation in which he had discontinue. He had further issues in early September with  left renal stone, requiring cystoscopy, stent placement 8/23/2022.       He is next seen back 9/28/2022 with repeat studies performed 9/21 demonstrating 5% iron saturation, ferritin of 12.5.  He remains further weight and fatigue, again oral iron intolerant and will require IV iron preparations for his iron deficiency anemia.      The patient is next seen 1/18/2023 with considerable improvement in his testing including H&H now 17.2 and 55.4 though with iron saturation of 8% and ferritin 26.5.  He has not noted any blood loss but remains generally weak and with ongoing periodic abdominal pain.  His major concerns continue to be a disequilibrium since his previous CVA symptoms.  He has not been seen in follow-up by neurology.  He continues to have hematuria by urinary assessment but not gross findings.  We have discussed that he may actually have a myeloproliferative disorder and requested that he undergo an assessment for JAK2 analysis today.    He is seen back 4/17/2023JAK  2-V617 F mutation having been detected.  In the interval he was admitted 2/19-21/23 for weakness, fall and ER evaluation not demonstrate any acute intracranial process, CT of lumbar spine with lumbar degenerative disease and other studies not show any acute abnormalities.  Fortunately he went on to improve though his wife had a positive COVID test recently on home examination.  His follow-up testing 4/10/2023 include an H&H of 18.3 and 60.9 white count of 14,700 platelet count of 477,000.    He is seen 4/17/2023 and we discussed that he is better served with phlebotomy at this point.  He states he feels so poorly that he is quite willing to try to move to additional therapies including phlebotomy.    Patient initiating Hydrea 1000 mg daily as of 5/19/2023.    He is next seen back in office 6/30/2023.  He is gradually seen an improvement in his hemoglobin hematocrit dropping to a normal CBC approximately 6/30/2023 H&H of 14.1 and 46.6, white count  of 4800 and platelet count of 146,000.  He is tolerating treatment well without additional side effects.    Patient seen 9/21/2023 with H&H 11.1 and 32.9, white count 5110, platelet count 151,000.  Patient without additional side effect from Hydrea though dose is now reduced to 500 mg every other day.    He was next seen in office 4/12/2024 after complications following COVID development late last year.  He has been hospitalized several times, and rehab several times and had evidence hemolysis leading to discontinuance of his Hydrea.  He was last seen in mid January stable hematologically.  Unfortunately we did not have additional recommendations as he tried to recover from the infection.        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  Hematologically, fortunately, he remained stable and is off Hydrea.                                                                                                                             Past Medical History:   Diagnosis Date   • Anxiety    • Arthritis    • Atrial flutter     Status post cavotricuspid isthmus ablation by Dr. Gustafson on 4/18/17   • Mandel esophagus    • Benign essential hypertension    • CAD (coronary artery disease)     3 vessel CABG 4/11/17 by Dr. Cortez: ROSARIO-prox LAD, SVG-OM1, SVG-OM3   • Carotid artery disease     Status post carotid endarterectomy - USG 4/10/17: 50-59% NICHELLE, 1-15% LICA.    • Colonic polyp    • Cyst of pancreas    • DDD (degenerative disc disease), lumbosacral    • GERD (gastroesophageal reflux disease)    • H/O bone density study 2013   • H/O complete eye exam 2014   • History of kidney stone    • HLD (hyperlipidemia)    • Hypertension    • Kidney stone     8/22/22   • Lipid screening 05/31/2013   • Low back pain     [facility]   • Screening for prostate cancer  "07/07/2015   • Skin cancer     nose   • Stroke     RESIDUAL--\"BALANCE ISSUES\"        Past Surgical History:   Procedure Laterality Date   • CARDIAC CATHETERIZATION N/A 04/10/2017    Procedure: Left Heart Cath;  Surgeon: Marjorie Healy MD;  Location: [facility] CATH INVASIVE LOCATION;  Service:    • CARDIAC CATHETERIZATION N/A 04/10/2017    Procedure: Coronary angiography;  Surgeon: Marjorie Healy MD;  Location: [facility] CATH INVASIVE LOCATION;  Service:    • CARDIAC CATHETERIZATION N/A 04/10/2017    Procedure: Left ventriculography;  Surgeon: Marjorie Healy MD;  Location: [facility] CATH INVASIVE LOCATION;  Service:    • CARDIAC CATHETERIZATION  2011   • CARDIAC ELECTROPHYSIOLOGY PROCEDURE N/A 04/18/2017    Procedure: Ablation atrial flutter;  Surgeon: Jose Antonio Gustafson MD;  Location: [facility] CATH INVASIVE LOCATION;  Service:    • CAROTID ENDARTERECTOMY     • CHOLECYSTECTOMY     • CHOLECYSTECTOMY WITH INTRAOPERATIVE CHOLANGIOGRAM N/A 09/07/2019    Procedure: Laparoscopic cholecystectomy with intraoperative cholangiogram;  Surgeon: Gauri Travis MD;  Location: [facility] MAIN OR;  Service: General   • COLONOSCOPY  01/06/2015    Diverticulosis, one TA   • COLONOSCOPY N/A 02/14/2019    tics, NBIH, adenomatous polyp x 2   • COLONOSCOPY N/A 11/05/2021    Procedure: COLONOSCOPY TO CECUM AND TERM. ILEUM WITH COLD POLYPECTOMIES;  Surgeon: Everton Abel MD;  Location: [facility] ENDOSCOPY;  Service: Gastroenterology;  Laterality: N/A;  PRE OP - PERS H/O POLYPS  POST OP - COLON POLYPS,, DIVERTICULOSIS, HEMORRHOIDS   • CORONARY ARTERY BYPASS GRAFT N/A 04/11/2017    Procedure: AR STERNOTOMY CORONARY ARTERY BYPASS GRAFT TIMES 3 USING LEFT INTERNAL MAMMARY ARTERY AND LEFT GREATER SAPHENOUS VEIN GRAFT PER ENDOSCOPIC VEIN HARVESTING AND PRP ;  Surgeon: Temo Cortez MD;  Location: [facility] MAIN OR;  Service:    • ENDOSCOPY  01/06/2015    HH, Mandel's esophagus   • ENDOSCOPY N/A 02/14/2019    Z line irregular, HH, Mandel's esophagus   • " ENDOSCOPY N/A 11/05/2021    Procedure: ESOPHAGOGASTRODUODENOSCOPY WITH BIOPSIES;  Surgeon: Everton Abel MD;  Location: [facility] ENDOSCOPY;  Service: Gastroenterology;  Laterality: N/A;  PRE OP - PERS H/O ERVIN'S  POST OP - IRREG Z LINE   • ENDOSCOPY W/ PEG TUBE PLACEMENT N/A 11/6/2023    Procedure: ESOPHAGOGASTRODUODENOSCOPY WITH PERCUTANEOUS ENDOSCOPIC GASTROSTOMY TUBE INSERTION;  Surgeon: Temo Ramsey MD;  Location: [facility] ENDOSCOPY;  Service: General;  Laterality: N/A;  Pre: dysphagia  Post: same   • KNEE SURGERY Left    • URETEROSCOPY LASER LITHOTRIPSY WITH STENT INSERTION Left 8/23/2022    Procedure: Cysto retrograde with left uretro stent placement;  Surgeon: Damien Oliveira MD;  Location: [facility] MAIN OR;  Service: Urology;  Laterality: Left;   • VASECTOMY          Current Outpatient Medications on File Prior to Visit   Medication Sig Dispense Refill   • allopurinol (ZYLOPRIM) 300 MG tablet Take 1 tablet by mouth Daily. Indications: Disorder of Excessive Uric Acid in the Blood     • amitriptyline (ELAVIL) 50 MG tablet Administer 1 tablet per G tube Every Night.     • apixaban (ELIQUIS) 2.5 MG tablet tablet Take 1 tablet by mouth Every 12 (Twelve) Hours. Indications: Atrial Fibrillation     • ciprofloxacin (Cipro) 250 MG tablet Take 1 tablet by mouth 2 (Two) Times a Day. 20 tablet 0   • folic acid (FOLVITE) 1 MG tablet Take 1 tablet by mouth Daily. Indications: Anemia From Inadequate Folic Acid     • HYDROcodone-acetaminophen (Norco) 7.5-325 MG per tablet Take 1 tablet by mouth 4 (Four) Times a Day As Needed for Moderate Pain or Severe Pain. Hold if sedation 12 tablet 0   • ipratropium-albuterol (DUO-NEB) 0.5-2.5 mg/3 ml nebulizer Take 3 mL by nebulization Every 4 (Four) Hours As Needed for Shortness of Air.     • lisinopril (PRINIVIL,ZESTRIL) 5 MG tablet Take 1 tablet by mouth Daily. 30 tablet 11   • metoclopramide (REGLAN) 5 MG/5ML solution Take 5 mL by mouth.     • Nutritional  Supplements (OSMOLITE 1.5 GIANNI PO) 240 Units/oz/day by Gastrostomy Tube route 6 (Six) Times a Day. Indications: nutritional support     • ondansetron (ZOFRAN) 4 MG tablet Take 1 tablet by mouth Every 8 (Eight) Hours As Needed. Indications: Nausea and Vomiting     • RABEprazole (ACIPHEX) 20 MG EC tablet Take 1 tablet by mouth Daily.     • rosuvastatin (Crestor) 20 MG tablet Administer 1 tablet per G tube Every Night.     • sucralfate (Carafate) 1 g tablet Take 1 tablet by mouth Daily. Patient only takes 1 time a day  Indications: Gastroesophageal Reflux Disease       Current Facility-Administered Medications on File Prior to Visit   Medication Dose Route Frequency Provider Last Rate Last Admin   • cyanocobalamin injection 1,000 mcg  1,000 mcg Intramuscular Q30 Days Damian Sanchez MD   1,000 mcg at 04/06/23 0957        ALLERGIES:    Allergies   Allergen Reactions   • Atorvastatin Myalgia     Myalgia   • Penicillins Hives, Swelling and Rash     Tolerates cephalosporins         Social History     Socioeconomic History   • Marital status:      Spouse name: Brooke   • Years of education: 9th grade   Tobacco Use   • Smoking status: Former     Current packs/day: 0.00     Average packs/day: 1 pack/day for 50.0 years (50.0 ttl pk-yrs)     Types: Cigarettes     Start date: 1/1/1959     Quit date: 1/1/2009     Years since quitting: 15.2   • Smokeless tobacco: Never   • Tobacco comments:     CAFFEINE USE   Vaping Use   • Vaping status: Never Used   Substance and Sexual Activity   • Alcohol use: Not Currently     Comment: rarely   • Drug use: No   • Sexual activity: Defer     Partners: Female        Family History   Problem Relation Age of Onset   • Hypertension Mother    • Heart disease Mother    • Heart attack Mother    • Stroke Mother    • Heart disease Father    • Heart attack Father    • Stroke Father    • Hypertension Sister    • Heart attack Brother    • Heart disease Brother    • No Known Problems Brother    •  "Heart disease Brother    • Heart attack Brother    • Diabetes Brother    • No Known Problems Maternal Grandmother    • No Known Problems Maternal Grandfather    • No Known Problems Paternal Grandmother    • No Known Problems Paternal Grandfather    • Pancreatic cancer Paternal Cousin    • Arthritis Other    • Diabetes Other    • Hypertension Other    • Kidney disease Other         stones   • Malig Hyperthermia Neg Hx         Review of Systems   As per HPI    Objective    Vitals:    04/12/24 1521   BP: 118/67   Pulse: 72   Resp: 18   Temp: 96.9 °F (36.1 °C)   TempSrc: Temporal   SpO2: 97%   Weight: 70.6 kg (155 lb 9.6 oz)   Height: 172.7 cm (68\")   PainSc: 0-No pain           4/12/2024     3:21 PM   Current Status   ECOG score 0     Physical Exam  Vitals reviewed.   Constitutional:       General: He is not in acute distress.     Appearance: Normal appearance. He is well-developed.   HENT:      Head: Normocephalic and atraumatic.   Eyes:      Pupils: Pupils are equal, round, and reactive to light.   Cardiovascular:      Rate and Rhythm: Normal rate and regular rhythm.      Heart sounds: Normal heart sounds. No murmur heard.  Pulmonary:      Effort: Pulmonary effort is normal. No respiratory distress.      Breath sounds: Normal breath sounds.   Abdominal:      Palpations: Abdomen is soft.   Musculoskeletal:         General: Normal range of motion.      Cervical back: Normal range of motion.   Skin:     General: Skin is warm and dry.      Findings: No rash.   Neurological:      Mental Status: He is alert and oriented to person, place, and time.       I have reexamined the patient and the results are consistent with the previously documented exam. Aquiles Lopez MD       RECENT LABS:    Assessment plan:  *Hospitalized in mid October to early November with COVID-pneumonia with profound deconditioning discharged to nursing home  Readmitted 11/22/2023 with anemia-patient has not been on hydroxyurea at discharge from the " hospital and remains on Eliquis  MCV is dropped significantly suggesting iron depletion and blood loss (but also may be related to being off Hydrea)  Patient himself denies hematochezia or melena  B12 folate normal ferritin 566 iron saturation 12% reticulocyte count 9% rule out hemolysis although acute blood loss can also cause an elevated reticulocyte  LDH elevated 356 haptoglobin low at 24-suggest hemolysis suggest hemolysis   Crossmatch compatible suggesting indirect claudia neg  Direct claudia Complement +?  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  These indices have been slowly reducing over the last 2 years approximately followed more closely.  Concurrently is a mild drop in his baseline hemoglobin still well within normal limits.  thrombocytosis thought, initially, to be related to iron deficiency.  Ferritin found to be 16.3 and iron of 26 with 5% saturation and TIBC 511.   The patient was placed on ferrous gluconate which, unfortunately, he was unable to tolerate with worsening constipation.  He had further issues in early September with left renal stone, requiring cystoscopy, stent placement 8/23/2022.  9/28/2022 with repeat studies performed 9/21 demonstrating 5% iron saturation, ferritin of  12.5.  He remains further weight and fatigue, again oral iron intolerant and will require IV iron preparations for his iron deficiency anemia.  We discontinued oral iron and proceeded with Injectafer given 9/28/2022 in 10/5/2022  4/17/2023JAK  2-V617 F mutation having been detected.    In the interval he was admitted 2/19-21/23 for weakness, fall and ER evaluation not demonstrate any acute intracranial process, CT of lumbar spine with lumbar degenerative disease and other studies not show any acute abnormalities.  Fortunately he went on to improve though his wife had a positive COVID test recently on home examination.  4/10/2023 include an H&H of 18.3 and 60.9 white count of 14,700 platelet count of 477,000.  5/15/2023 hemoglobin 15.3, hematocrit 50.4.  Reviewed with Dr. Lopez plans to hold off on phlebotomy and initiate Hydrea 1000 mg daily.  We will tentatively schedule him for return follow-up visit in 2 weeks with repeat labs and reassessment.  6/1/2020 2:23 weeks of Hydrea 1000 mg daily, WBC 5.0, hemoglobin 15.2, hematocrit 49.8, platelets 113,000.  Hydrea was reduced to 500 mg daily  Stability of counts today, 6/15/2023 on 500 mg daily with WBC 4.81, hemoglobin 14.6, hematocrit 46.7%, platelets 156,000  Patient seen 6/30/2023 with H&H of 14.1 and 46.6 white count of 4800 and platelet count of 1 46,000.  Patient subsequently assessed including 9/21/2023 with H&H gradually dropping 11.1 and 32.9, white count of 5110, platelet count 151,000, .8.  10/20/2023 WBC 8.04, SNC 5.42, Hgb 13.1, Platelets 247,000.  Continue Hydrea 500 mg every other day.  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  Patient encouraged to proceed with lymphedema clinic and physical therapy is available  4.  2-month CBC, 4 months MD, CBC

## 2024-04-12 NOTE — PROGRESS NOTES
REASON FOR FOLLOW-UP: Polycythemia vera, JAK2 mutation detected    HISTORY OF PRESENT ILLNESS:  Mr. Santoro is a 78 y.o. male with the above-mentioned history, who returns to the office after complications following COVID development late last year.  He has been hospitalized several times, and rehab several times and had evidence hemolysis leading to discontinuance of his Hydrea.  He was last seen in mid January stable hematologically.  Unfortunately we did not have additional recommendations as he tried to recover from the infection.        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  Hematologically, fortunately, he remained stable and is off Hydrea.                                                                                                                           Hematologic/oncologic history:     The patient is a 78-year-old male followed with a number of issues including coronary artery disease, status post CABG, atrial flutter, previous CVA hyperlipidemia, GE reflux, carotid vascular disease, and hypertension.     He had been seen by vascular 2/25/2022 with mild progression of carotid disease but otherwise stable and also had follow-up with pain management for back pain 3/28/2022 requiring chronic narcotic therapy.  Patient has been resistant to epidural like procedures.     Unfortunately has had concurrent constipation requiring laxatives and additional opioid antagonist to reduce GI hypomotility.  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     Patient recently reviewed again by pain management with worsening pain.  Patient also saw GI periodically undergoing a follow-up MRI of the abdomen 8/9/2022 with  scattered pancreatic cystic lesions unchanged from previous.  He had previously undergone EGD and colonoscopy 11/5/2021 with irregular Z-line and biopsies taken in nonbleeding internal hemorrhoids found during retroflexion that were small.  There are scattered small and large mouth diverticula found in the sigmoid colon descending colon and splenic flexure.       The patient now presents for thrombocytosis with review of his record over the last year demonstrating a platelet count that is escalated from 3-400,000 now to 8/11/2022 648,000 but concurrent microcytic and hypochromic indices.  These indices have been slowly reducing over the last 2 years approximately followed more closely.        These findings are discussed with the patient 8/16/2022 who indicates that he actually feels about the same though still has issues with back pain.  He is noted no change in bowel habit including, fortunately, improvement of his constipation without the use of additional medication such as Movantik.  He has not noted any change in the color of his stool including dark stools or any blood per rectum, urine though he does note periodic excess bruising from his anticoagulation therapy.       The patient moved to a trial of iron which, unfortunately, worsened his constipation in which he had discontinue. He had further issues in early September with left renal stone, requiring cystoscopy, stent placement 8/23/2022.       He is next seen back 9/28/2022 with repeat studies performed 9/21 demonstrating 5% iron saturation, ferritin of 12.5.  He remains further weight and fatigue, again oral iron intolerant and will require IV iron preparations for his iron deficiency anemia.      The patient is next seen 1/18/2023 with considerable improvement in his testing including H&H now 17.2 and 55.4 though with iron saturation of 8% and ferritin 26.5.  He has not noted any blood loss but remains generally weak and with ongoing periodic abdominal  pain.  His major concerns continue to be a disequilibrium since his previous CVA symptoms.  He has not been seen in follow-up by neurology.  He continues to have hematuria by urinary assessment but not gross findings.  We have discussed that he may actually have a myeloproliferative disorder and requested that he undergo an assessment for JAK2 analysis today.    He is seen back 4/17/2023JAK  2-V617 F mutation having been detected.  In the interval he was admitted 2/19-21/23 for weakness, fall and ER evaluation not demonstrate any acute intracranial process, CT of lumbar spine with lumbar degenerative disease and other studies not show any acute abnormalities.  Fortunately he went on to improve though his wife had a positive COVID test recently on home examination.  His follow-up testing 4/10/2023 include an H&H of 18.3 and 60.9 white count of 14,700 platelet count of 477,000.    He is seen 4/17/2023 and we discussed that he is better served with phlebotomy at this point.  He states he feels so poorly that he is quite willing to try to move to additional therapies including phlebotomy.    Patient initiating Hydrea 1000 mg daily as of 5/19/2023.    He is next seen back in office 6/30/2023.  He is gradually seen an improvement in his hemoglobin hematocrit dropping to a normal CBC approximately 6/30/2023 H&H of 14.1 and 46.6, white count of 4800 and platelet count of 146,000.  He is tolerating treatment well without additional side effects.    Patient seen 9/21/2023 with H&H 11.1 and 32.9, white count 5110, platelet count 151,000.  Patient without additional side effect from Hydrea though dose is now reduced to 500 mg every other day.    He was next seen in office 4/12/2024 after complications following COVID development late last year.  He has been hospitalized several times, and rehab several times and had evidence hemolysis leading to discontinuance of his Hydrea.  He was last seen in mid January stable  "hematologically.  Unfortunately we did not have additional recommendations as he tried to recover from the infection.        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  Hematologically, fortunately, he remained stable and is off Hydrea.                                                                                                                             Past Medical History:   Diagnosis Date    Anxiety     Arthritis     Atrial flutter     Status post cavotricuspid isthmus ablation by Dr. Gustafson on 4/18/17    Mandel esophagus     Benign essential hypertension     CAD (coronary artery disease)     3 vessel CABG 4/11/17 by Dr. Cortez: ROSARIO-prox LAD, SVG-OM1, SVG-OM3    Carotid artery disease     Status post carotid endarterectomy - [facility] 4/10/17: 50-59% NICHELLE, 1-15% LICA.     Colonic polyp     Cyst of pancreas     DDD (degenerative disc disease), lumbosacral     GERD (gastroesophageal reflux disease)     H/O bone density study 2013    H/O complete eye exam 2014    History of kidney stone     HLD (hyperlipidemia)     Hypertension     Kidney stone     8/22/22    Lipid screening 05/31/2013    Low back pain     physical therapy [facility] rehab 5-12-10    Screening for prostate cancer 07/07/2015    Skin cancer     nose    Stroke     RESIDUAL--\"BALANCE ISSUES\"        Past Surgical History:   Procedure Laterality Date    CARDIAC CATHETERIZATION N/A 04/10/2017    Procedure: Left Heart Cath;  Surgeon: Marjorie Healy MD;  Location:  [facility];  Service:     CARDIAC CATHETERIZATION N/A 04/10/2017    Procedure: Coronary angiography;  Surgeon: Marjorie Healy MD;  Location:  [facility];  Service:     CARDIAC CATHETERIZATION N/A 04/10/2017    Procedure: Left ventriculography;  Surgeon: Marjorie Healy MD;  Location:  [facility];  " Service:     CARDIAC CATHETERIZATION  2011    CARDIAC ELECTROPHYSIOLOGY PROCEDURE N/A 04/18/2017    Procedure: Ablation atrial flutter;  Surgeon: Jose Antonio Gustafson MD;  Location: [facility] CATH INVASIVE LOCATION;  Service:     CAROTID ENDARTERECTOMY      CHOLECYSTECTOMY      CHOLECYSTECTOMY WITH INTRAOPERATIVE CHOLANGIOGRAM N/A 09/07/2019    Procedure: Laparoscopic cholecystectomy with intraoperative cholangiogram;  Surgeon: Gauri Travis MD;  Location: [facility] MAIN OR;  Service: General    COLONOSCOPY  01/06/2015    Diverticulosis, one TA    COLONOSCOPY N/A 02/14/2019    tics, NBIH, adenomatous polyp x 2    COLONOSCOPY N/A 11/05/2021    Procedure: COLONOSCOPY TO CECUM AND TERM. ILEUM WITH COLD POLYPECTOMIES;  Surgeon: Everton Abel MD;  Location: [facility] ENDOSCOPY;  Service: Gastroenterology;  Laterality: N/A;  PRE OP - PERS H/O POLYPS  POST OP - COLON POLYPS,, DIVERTICULOSIS, HEMORRHOIDS    CORONARY ARTERY BYPASS GRAFT N/A 04/11/2017    Procedure: AR STERNOTOMY CORONARY ARTERY BYPASS GRAFT TIMES 3 USING LEFT INTERNAL MAMMARY ARTERY AND LEFT GREATER SAPHENOUS VEIN GRAFT PER ENDOSCOPIC VEIN HARVESTING AND PRP ;  Surgeon: Temo Cortez MD;  Location: [facility] MAIN OR;  Service:     ENDOSCOPY  01/06/2015    HH, Ervin's esophagus    ENDOSCOPY N/A 02/14/2019    Z line irregular, HH, Ervin's esophagus    ENDOSCOPY N/A 11/05/2021    Procedure: ESOPHAGOGASTRODUODENOSCOPY WITH BIOPSIES;  Surgeon: Everton Abel MD;  Location: [facility] ENDOSCOPY;  Service: Gastroenterology;  Laterality: N/A;  PRE OP - PERS H/O ERVIN'S  POST OP - IRREG Z LINE    ENDOSCOPY W/ PEG TUBE PLACEMENT N/A 11/6/2023    Procedure: ESOPHAGOGASTRODUODENOSCOPY WITH PERCUTANEOUS ENDOSCOPIC GASTROSTOMY TUBE INSERTION;  Surgeon: Temo Ramsey MD;  [facility];  Service: General;  Laterality: N/A;  Pre: dysphagia  Post: same    KNEE SURGERY Left     URETEROSCOPY LASER LITHOTRIPSY WITH STENT INSERTION Left 8/23/2022     Procedure: Cysto retrograde with left uretro stent placement;  Surgeon: Damien Oliveira MD;  Location: [facility];  Service: Urology;  Laterality: Left;    VASECTOMY          Current Outpatient Medications on File Prior to Visit   Medication Sig Dispense Refill    allopurinol (ZYLOPRIM) 300 MG tablet Take 1 tablet by mouth Daily. Indications: Disorder of Excessive Uric Acid in the Blood      amitriptyline (ELAVIL) 50 MG tablet Administer 1 tablet per G tube Every Night.      apixaban (ELIQUIS) 2.5 MG tablet tablet Take 1 tablet by mouth Every 12 (Twelve) Hours. Indications: Atrial Fibrillation      ciprofloxacin (Cipro) 250 MG tablet Take 1 tablet by mouth 2 (Two) Times a Day. 20 tablet 0    folic acid (FOLVITE) 1 MG tablet Take 1 tablet by mouth Daily. Indications: Anemia From Inadequate Folic Acid      HYDROcodone-acetaminophen (Norco) 7.5-325 MG per tablet Take 1 tablet by mouth 4 (Four) Times a Day As Needed for Moderate Pain or Severe Pain. Hold if sedation 12 tablet 0    ipratropium-albuterol (DUO-NEB) 0.5-2.5 mg/3 ml nebulizer Take 3 mL by nebulization Every 4 (Four) Hours As Needed for Shortness of Air.      lisinopril (PRINIVIL,ZESTRIL) 5 MG tablet Take 1 tablet by mouth Daily. 30 tablet 11    metoclopramide (REGLAN) 5 MG/5ML solution Take 5 mL by mouth.      Nutritional Supplements (OSMOLITE 1.5 GIANNI PO) 240 Units/oz/day by Gastrostomy Tube route 6 (Six) Times a Day. Indications: nutritional support      ondansetron (ZOFRAN) 4 MG tablet Take 1 tablet by mouth Every 8 (Eight) Hours As Needed. Indications: Nausea and Vomiting      RABEprazole (ACIPHEX) 20 MG EC tablet Take 1 tablet by mouth Daily.      rosuvastatin (Crestor) 20 MG tablet Administer 1 tablet per G tube Every Night.      sucralfate (Carafate) 1 g tablet Take 1 tablet by mouth Daily. Patient only takes 1 time a day  Indications: Gastroesophageal Reflux Disease       Current Facility-Administered Medications on File Prior to Visit  "  Medication Dose Route Frequency Provider Last Rate Last Admin    cyanocobalamin injection 1,000 mcg  1,000 mcg Intramuscular Q30 Days Damian Sanchez MD   1,000 mcg at 04/06/23 0957        ALLERGIES:    Allergies   Allergen Reactions    Atorvastatin Myalgia     Myalgia    Penicillins Hives, Swelling and Rash     Tolerates cephalosporins         Social History     Socioeconomic History    Marital status:      Spouse name: Brooke    Years of education: 9th grade   Tobacco Use    Smoking status: Former     Current packs/day: 0.00     Average packs/day: 1 pack/day for 50.0 years (50.0 ttl pk-yrs)     Types: Cigarettes     Start date: 1/1/1959     Quit date: 1/1/2009     Years since quitting: 15.2    Smokeless tobacco: Never    Tobacco comments:     CAFFEINE USE   Vaping Use    Vaping status: Never Used   Substance and Sexual Activity    Alcohol use: Not Currently     Comment: rarely    Drug use: No    Sexual activity: Defer     Partners: Female        Family History   Problem Relation Age of Onset    Hypertension Mother     Heart disease Mother     Heart attack Mother     Stroke Mother     Heart disease Father     Heart attack Father     Stroke Father     Hypertension Sister     Heart attack Brother     Heart disease Brother     No Known Problems Brother     Heart disease Brother     Heart attack Brother     Diabetes Brother     No Known Problems Maternal Grandmother     No Known Problems Maternal Grandfather     No Known Problems Paternal Grandmother     No Known Problems Paternal Grandfather     Pancreatic cancer Paternal Cousin     Arthritis Other     Diabetes Other     Hypertension Other     Kidney disease Other         stones    Malig Hyperthermia Neg Hx         Review of Systems   As per HPI    Objective     Vitals:    04/12/24 1521   BP: 118/67   Pulse: 72   Resp: 18   Temp: 96.9 °F (36.1 °C)   TempSrc: Temporal   SpO2: 97%   Weight: 70.6 kg (155 lb 9.6 oz)   Height: 172.7 cm (68\")   PainSc: 0-No pain "           4/12/2024     3:21 PM   Current Status   ECOG score 0     Physical Exam  Vitals reviewed.   Constitutional:       General: He is not in acute distress.     Appearance: Normal appearance. He is well-developed.   HENT:      Head: Normocephalic and atraumatic.   Eyes:      Pupils: Pupils are equal, round, and reactive to light.   Cardiovascular:      Rate and Rhythm: Normal rate and regular rhythm.      Heart sounds: Normal heart sounds. No murmur heard.  Pulmonary:      Effort: Pulmonary effort is normal. No respiratory distress.      Breath sounds: Normal breath sounds.   Abdominal:      Palpations: Abdomen is soft.   Musculoskeletal:         General: Normal range of motion.      Cervical back: Normal range of motion.   Skin:     General: Skin is warm and dry.      Findings: No rash.   Neurological:      Mental Status: He is alert and oriented to person, place, and time.       I have reexamined the patient and the results are consistent with the previously documented exam. Aquiles Lopez MD       RECENT LABS:    Assessment plan:  *Hospitalized in mid October to early November with COVID-pneumonia with profound deconditioning discharged to nursing home  Readmitted 11/22/2023 with anemia-patient has not been on hydroxyurea at discharge from the hospital and remains on Eliquis  MCV is dropped significantly suggesting iron depletion and blood loss (but also may be related to being off Hydrea)  Patient himself denies hematochezia or melena  B12 folate normal ferritin 566 iron saturation 12% reticulocyte count 9% rule out hemolysis although acute blood loss can also cause an elevated reticulocyte  LDH elevated 356 haptoglobin low at 24-suggest hemolysis suggest hemolysis   Crossmatch compatible suggesting indirect claudia neg  Direct claudia Complement +?  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  These indices have been slowly reducing over the last 2 years approximately followed more closely.  Concurrently is a mild drop in his baseline hemoglobin still well within normal limits.  thrombocytosis thought, initially, to be related to iron deficiency.  Ferritin found to be 16.3 and iron of 26 with 5% saturation and TIBC 511.   The patient was placed on ferrous gluconate which, unfortunately, he was unable to tolerate with worsening constipation.  He had further issues in early September with left renal stone, requiring cystoscopy, stent placement 8/23/2022.  9/28/2022 with repeat studies performed 9/21 demonstrating 5% iron saturation, ferritin of 12.5.  He remains further weight and fatigue, again oral iron intolerant and will require IV iron preparations for his iron deficiency anemia.  We discontinued oral iron and proceeded with Injectafer given 9/28/2022 in 10/5/2022  4/17/2023JAK  2-V617 F mutation having been detected.    In the interval he was admitted 2/19-21/23 for weakness, fall and ER evaluation not demonstrate any acute intracranial process, CT of lumbar spine with lumbar degenerative disease and other studies not show any acute abnormalities.  Fortunately he went on to improve though his wife had a positive COVID test recently on home examination.  4/10/2023 include an H&H of 18.3 and 60.9 white  count of 14,700 platelet count of 477,000.  5/15/2023 hemoglobin 15.3, hematocrit 50.4.  Reviewed with Dr. Lopez plans to hold off on phlebotomy and initiate Hydrea 1000 mg daily.  We will tentatively schedule him for return follow-up visit in 2 weeks with repeat labs and reassessment.  6/1/2020 2:23 weeks of Hydrea 1000 mg daily, WBC 5.0, hemoglobin 15.2, hematocrit 49.8, platelets 113,000.  Hydrea was reduced to 500 mg daily  Stability of counts today, 6/15/2023 on 500 mg daily with WBC 4.81, hemoglobin 14.6, hematocrit 46.7%, platelets 156,000  Patient seen 6/30/2023 with H&H of 14.1 and 46.6 white count of 4800 and platelet count of 1 46,000.  Patient subsequently assessed including 9/21/2023 with H&H gradually dropping 11.1 and 32.9, white count of 5110, platelet count 151,000, .8.  10/20/2023 WBC 8.04, SNC 5.42, Hgb 13.1, Platelets 247,000.  Continue Hydrea 500 mg every other day.  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  Patient encouraged to proceed with lymphedema clinic and physical therapy is available  4.  2-month CBC, 4 months MD, CBC

## 2024-04-15 ENCOUNTER — SPECIALTY PHARMACY (OUTPATIENT)
Dept: PHARMACY | Facility: HOSPITAL | Age: 79
End: 2024-04-15
Payer: MEDICARE

## 2024-04-15 NOTE — PROGRESS NOTES
[facility] Chart Review    MD dictation noted:   Continue to hold hydroxyurea    Patient will be disenrolled from [facility] oncology program, as he is not receiving oral chemotherapy at this time.  Please re-consult [facility] team if therapy reinitiated in future.     .    Julianne Vicente [facility]  4/15/2024  11:20 EDT

## 2024-04-22 ENCOUNTER — OFFICE VISIT (OUTPATIENT)
Dept: PAIN MEDICINE | Facility: CLINIC | Age: 79
End: 2024-04-22
Payer: MEDICARE

## 2024-04-22 VITALS
OXYGEN SATURATION: 98 % | DIASTOLIC BLOOD PRESSURE: 68 MMHG | BODY MASS INDEX: 23.04 KG/M2 | TEMPERATURE: 97.1 F | SYSTOLIC BLOOD PRESSURE: 144 MMHG | HEIGHT: 68 IN | HEART RATE: 84 BPM | WEIGHT: 152 LBS

## 2024-04-22 DIAGNOSIS — M25.50 ARTHRALGIA OF MULTIPLE JOINTS: ICD-10-CM

## 2024-04-22 DIAGNOSIS — M54.50 CHRONIC BILATERAL LOW BACK PAIN WITHOUT SCIATICA: ICD-10-CM

## 2024-04-22 DIAGNOSIS — G89.29 CHRONIC BILATERAL LOW BACK PAIN WITHOUT SCIATICA: ICD-10-CM

## 2024-04-22 DIAGNOSIS — M25.551 CHRONIC PAIN OF BOTH HIPS: ICD-10-CM

## 2024-04-22 DIAGNOSIS — M51.37 DDD (DEGENERATIVE DISC DISEASE), LUMBOSACRAL: Primary | ICD-10-CM

## 2024-04-22 DIAGNOSIS — M25.552 CHRONIC PAIN OF BOTH HIPS: ICD-10-CM

## 2024-04-22 DIAGNOSIS — Z79.899 ENCOUNTER FOR LONG-TERM (CURRENT) USE OF HIGH-RISK MEDICATION: ICD-10-CM

## 2024-04-22 DIAGNOSIS — G89.29 CHRONIC PAIN OF BOTH HIPS: ICD-10-CM

## 2024-04-22 PROCEDURE — 99214 OFFICE O/P EST MOD 30 MIN: CPT | Performed by: NURSE PRACTITIONER

## 2024-04-22 PROCEDURE — 80305 DRUG TEST PRSMV DIR OPT OBS: CPT | Performed by: NURSE PRACTITIONER

## 2024-04-22 PROCEDURE — 3077F SYST BP >= 140 MM HG: CPT | Performed by: NURSE PRACTITIONER

## 2024-04-22 PROCEDURE — 1125F AMNT PAIN NOTED PAIN PRSNT: CPT | Performed by: NURSE PRACTITIONER

## 2024-04-22 PROCEDURE — 3078F DIAST BP <80 MM HG: CPT | Performed by: NURSE PRACTITIONER

## 2024-04-22 PROCEDURE — 1160F RVW MEDS BY RX/DR IN RCRD: CPT | Performed by: NURSE PRACTITIONER

## 2024-04-22 PROCEDURE — 1159F MED LIST DOCD IN RCRD: CPT | Performed by: NURSE PRACTITIONER

## 2024-04-22 RX ORDER — HYDROCODONE BITARTRATE AND ACETAMINOPHEN 5; 325 MG/1; MG/1
1 TABLET ORAL 3 TIMES DAILY PRN
Qty: 90 TABLET | Refills: 0 | Status: SHIPPED | OUTPATIENT
Start: 2024-04-22

## 2024-04-22 NOTE — PROGRESS NOTES
CHIEF COMPLAINT    Back pain. The patient states the back pain is unchanged from last visit in October.    Subjective   Madhu Santoro is a 78 y.o. male  who presents for follow-up.  He has a history of back and joint pain. He presents today with his wife who is helpful in providing care.     He was hospitalized in October 2023 for 18 days with COVID. Since October he has been in and out of the hospital and in-patient rehab at [facility]. He has been home since February first.    Today his pain is 8/10VAS in severity. He states that while he was in the hospital his pain was being treated with Norco 5/325 TID PRN. He states that this maintained his pain as well as the Norco 10/325. Since discharge from the hospital he has been  using an old prescription of his Norco to control his pain. He states that the Norco 5/325 knocks his pain down to where he can tolerate this. He denies any side effects including somnolence.     During hospitalization in January 2024 spinal cause of lower extremity weakness ruled out by neurosurgery.     He completed home PT and OT.     Not a candidate for NSAIDs - history of TIA's on Eliquis    Previously had no interest in interventional procedures due to a friend having developed foot drop following a procedure for back pain. Previously failed PT.      EMG/nerve conduction study ordered by Dr. Russell on 4/27/23 showed moderate to severe peripheral neuropathy. He takes Elavil 50mg at night for neuropathy pain. He also takes Allopurinol for gout (prescribed by Dr. Sanchez).      Failed Pregabalin - caused BLE swelling    Back Pain  This is a chronic problem. The current episode started more than 1 year ago. The problem occurs constantly. The problem is unchanged. The pain is present in the lumbar spine. The quality of the pain is described as aching (throbbing). The pain is at a severity of 8/10. The symptoms are aggravated by standing, sitting, twisting, bending, lying down and position  (walking long distances, weather changes). Stiffness is present All day. Associated symptoms include numbness (bilateral leg and bilateral foot, bilateral hand) and weakness (bilateral leg). Pertinent negatives include no abdominal pain, chest pain, dysuria, fever or headaches. He has tried heat and analgesics (rest/reposition, PT in the past (this caused increased pain)) for the symptoms. The treatment provided mild relief.   Joint Pain  This is a chronic (bilateral hands, left shoulder, bilateral knees) problem. The current episode started more than 1 year ago. The problem occurs daily. The problem has been unchanged. Associated symptoms include arthralgias, numbness (bilateral leg and bilateral foot, bilateral hand) and weakness (bilateral leg). Pertinent negatives include no abdominal pain, chest pain, chills, congestion, coughing, fatigue, fever, headaches or joint swelling. The symptoms are aggravated by walking, standing and bending (weather changes, increased physical activity, ). He has tried oral narcotics, position changes, rest, heat, lying down and acetaminophen for the symptoms. The treatment provided moderate relief.      PEG Assessment   What number best describes your pain on average in the past week?8  What number best describes how, during the past week, pain has interfered with your enjoyment of life?10  What number best describes how, during the past week, pain has interfered with your general activity?  10    Review of Pertinent Medical Data ---  Office visit from 4/12/2024 with Niall Ramos.  Patient returns to office after complications following COVID.  He has been hospitalized several times and in rehab several times.  History of polycythemia vera now with likely hemolytic anemia.  Continue to hold hydroxyurea.  Continue low-dose Eliquis.  Follow-up 2 months with CBC, 4 months with MD CBC.    The following portions of the patient's history were reviewed and updated as appropriate:  "allergies, current medications, past family history, past medical history, past social history, past surgical history, and problem list.    Review of Systems   Constitutional:  Negative for chills, fatigue and fever.   HENT:  Negative for congestion.    Respiratory:  Negative for cough and shortness of breath.    Cardiovascular:  Negative for chest pain.   Gastrointestinal:  Negative for abdominal pain, constipation and diarrhea.   Genitourinary:  Positive for difficulty urinating. Negative for dysuria.   Musculoskeletal:  Positive for arthralgias, back pain and gait problem (uses a wheelchair). Negative for joint swelling.   Neurological:  Positive for weakness (bilateral leg), light-headedness (occasional when standing) and numbness (bilateral leg and bilateral foot, bilateral hand). Negative for dizziness and headaches.   Psychiatric/Behavioral:  Negative for agitation.      --  The aforementioned information the Chief Complaint section and above subjective data including any HPI data, and also the Review of Systems data, has been personally reviewed and affirmed.  --    Vitals:    04/22/24 1449   BP: 144/68   BP Location: Left arm   Pulse: 84   Temp: 97.1 °F (36.2 °C)   TempSrc: Temporal   SpO2: 98%   Weight: 68.9 kg (152 lb)   Height: 172.7 cm (68\")   PainSc:   8   PainLoc: Back     Objective   Physical Exam  Vitals and nursing note reviewed.   Constitutional:       Appearance: Normal appearance. He is well-developed.   Eyes:      General: Lids are normal.   Cardiovascular:      Rate and Rhythm: Normal rate.   Pulmonary:      Effort: Pulmonary effort is normal.   Musculoskeletal:      Lumbar back: Tenderness and bony tenderness present. Decreased range of motion.      Comments: Diffuse arthritic changes   Neurological:      Mental Status: He is alert and oriented to person, place, and time.      Gait: Gait abnormal (wheelchair).   Psychiatric:         Attention and Perception: Attention normal.         Mood and " Affect: Mood normal.         Speech: Speech normal.         Behavior: Behavior normal.         Judgment: Judgment normal.       Assessment & Plan   Diagnoses and all orders for this visit:    1. DDD (degenerative disc disease), lumbosacral (Primary)  -     HYDROcodone-acetaminophen (NORCO) 5-325 MG per tablet; Take 1 tablet by mouth 3 (Three) Times a Day As Needed for Severe Pain or Moderate Pain.  Dispense: 90 tablet; Refill: 0  -     Urine Drug Screen Confirmation - Urine, Clean Catch; Future  -     POC Urine Drug Screen, Triage    2. Chronic bilateral low back pain without sciatica  -     Urine Drug Screen Confirmation - Urine, Clean Catch; Future  -     POC Urine Drug Screen, Triage    3. Chronic pain of both hips  -     HYDROcodone-acetaminophen (NORCO) 5-325 MG per tablet; Take 1 tablet by mouth 3 (Three) Times a Day As Needed for Severe Pain or Moderate Pain.  Dispense: 90 tablet; Refill: 0  -     Urine Drug Screen Confirmation - Urine, Clean Catch; Future  -     POC Urine Drug Screen, Triage    4. Arthralgia of multiple joints  -     HYDROcodone-acetaminophen (NORCO) 5-325 MG per tablet; Take 1 tablet by mouth 3 (Three) Times a Day As Needed for Severe Pain or Moderate Pain.  Dispense: 90 tablet; Refill: 0  -     Urine Drug Screen Confirmation - Urine, Clean Catch; Future  -     POC Urine Drug Screen, Triage    5. Encounter for long-term (current) use of high-risk medication      Madhu Santoro reports a pain score of 8.  Given his pain assessment as noted, treatment options were discussed and the following options were decided upon as a follow-up plan to address the patient's pain: continuation of current treatment plan for pain.    --- Routine UDS in office today as part of monitoring requirements for controlled substances.  The specimen was viewed and the immunoassay result reviewed and is +OPI.  This specimen will be sent to [facility] for confirmation.     --- CSA updated 6/1/2023  ---  Opioid Risk Tool at next office visit.   --- Resume management of Norco 5/325 TID PRN. Patient appears stable with current regimen. No adverse effects. Regarding continuation of opioids, there is no evidence of aberrant behavior or any red flags.  The patient continues with appropriate response to opioid therapy. ADL's remain intact by self.   --- Follow-up 2 months or sooner if needed.      RUTH REPORT  As part of the patient's treatment plan, I am prescribing controlled substances. The patient has been made aware of appropriate use of such medications, including potential risk of somnolence, limited ability to drive and/or work safely, and the potential for dependence or overdose. It has also been made clear that these medications are for use by this patient only, without concomitant use of alcohol or other substances unless prescribed.     Patient has completed prescribing agreement detailing terms of continued prescribing of controlled substances, including monitoring RUTH reports, urine drug screening, and pill counts if necessary. The patient is aware that inappropriate use will results in cessation of prescribing such medications.    As the clinician, I personally reviewed the RUTH from 4/22/2024 while the patient was in the office today.    History and physical exam exhibit continued safe and appropriate use of controlled substances.    Dictated utilizing Dragon dictation.

## 2024-04-24 ENCOUNTER — HOSPITAL ENCOUNTER (OUTPATIENT)
Dept: PHYSICAL THERAPY | Facility: HOSPITAL | Age: 79
Discharge: HOME | End: 2024-04-24
Admitting: FAMILY MEDICINE
Payer: MEDICARE

## 2024-04-24 DIAGNOSIS — I89.0 LYMPHEDEMA: Primary | ICD-10-CM

## 2024-04-24 PROCEDURE — 97535 SELF CARE MNGMENT TRAINING: CPT

## 2024-04-24 PROCEDURE — 97162 PT EVAL MOD COMPLEX 30 MIN: CPT

## 2024-04-24 NOTE — THERAPY EVALUATION
Physical Therapy Lymphedema Initial Evaluation  [facility]     Patient Name: Madhu Santoro  : 1945  MRN: [id number]  Today's Date: 2024      Visit Date: 2024    Visit Dx:    ICD-10-CM ICD-9-CM   1. Lymphedema  I89.0 457.1       Patient Active Problem List   Diagnosis    Anxiety    Arthritis    Coronary artery disease due to lipid rich plaque    HLD (hyperlipidemia)    Benign essential hypertension    DDD (degenerative disc disease), lumbosacral    Cerebrovascular accident    Encounter for screening for cardiovascular disorders    Gastroesophageal reflux disease    Hyperlipidemia    Stenosis of carotid artery    Former smoker    History of cardiac catheterization    S/P ablation of atrial flutter    Typical atrial flutter    S/P CABG (coronary artery bypass graft)    Adenomatous polyp of ascending colon    Abdominal bloating    Stroke    PAD (peripheral artery disease)    Acute cholecystitis    Right upper quadrant abdominal pain    Chronic pain of both hips    Chronic bilateral low back pain without sciatica    Sacroiliac joint dysfunction of both sides    Encounter for long-term (current) use of high-risk medication    Lumbar facet arthropathy    Stroke-like symptoms    CVA (cerebral vascular accident)    Personal history of colonic polyps    Arthralgia of multiple joints    Iron deficiency    Thrombocytosis    Left ureteral stone    Obstructive uropathy    Chronic anticoagulation    Facial skin lesion    Iron deficiency anemia    Polycythemia    Neuropathy    Weakness    Pneumonia    Close exposure to COVID-19 virus    Polycythemia vera    Chronic gout without tophus    COVID-19    Cytokine release syndrome, grade 2    Anemia    History of CVA (cerebrovascular accident)    S/P percutaneous endoscopic gastrostomy (PEG) tube placement    Mandel esophagus    Sacral decubitus ulcer    Oral phase dysphagia    Acute blood loss anemia    Orthostatic dizziness    Orthostatic hypotension     "Bilateral leg weakness        Past Medical History:   Diagnosis Date    Anxiety     Arthritis     Atrial flutter     Status post cavotricuspid isthmus ablation by Dr. Gustafson on 4/18/17    Mandel esophagus     Benign essential hypertension     CAD (coronary artery disease)     3 vessel CABG 4/11/17 by Dr. Cortez: ROSARIO-prox LAD, SVG-OM1, SVG-OM3    Carotid artery disease     Status post carotid endarterectomy - USG 4/10/17: 50-59% NICHELLE, 1-15% LICA.     Colonic polyp     Cyst of pancreas     DDD (degenerative disc disease), lumbosacral     GERD (gastroesophageal reflux disease)     H/O bone density study 2013    H/O complete eye exam 2014    History of kidney stone     HLD (hyperlipidemia)     Hypertension     Kidney stone     8/22/22    Lipid screening 05/31/2013    Low back pain     physical therapy [facility] rehab 5-12-10    Screening for prostate cancer 07/07/2015    Skin cancer     nose    Stroke     RESIDUAL--\"BALANCE ISSUES\"        Past Surgical History:   Procedure Laterality Date    CARDIAC CATHETERIZATION N/A 04/10/2017    Procedure: Left Heart Cath;  Surgeon: Marjorie Healy MD;  Location: [facility] CATH INVASIVE LOCATION;  Service:     CARDIAC CATHETERIZATION N/A 04/10/2017    Procedure: Coronary angiography;  Surgeon: Marjorie Healy MD;  [facility];  Service:     CARDIAC CATHETERIZATION N/A 04/10/2017    Procedure: Left ventriculography;  Surgeon: Marjorie Healy MD;  Location: [facility] CATH INVASIVE LOCATION;  Service:     CARDIAC CATHETERIZATION  2011    CARDIAC ELECTROPHYSIOLOGY PROCEDURE N/A 04/18/2017    Procedure: Ablation atrial flutter;  Surgeon: Jose Antonio Gustafson MD;  Location: [facility] CATH INVASIVE LOCATION;  Service:     CAROTID ENDARTERECTOMY      CHOLECYSTECTOMY      CHOLECYSTECTOMY WITH INTRAOPERATIVE CHOLANGIOGRAM N/A 09/07/2019    Procedure: Laparoscopic cholecystectomy with intraoperative cholangiogram;  Surgeon: Gauri Travis MD;  Location: [facility] MAIN OR;  " Service: General    COLONOSCOPY  01/06/2015    Diverticulosis, one TA    COLONOSCOPY N/A 02/14/2019    tics, NBIH, adenomatous polyp x 2    COLONOSCOPY N/A 11/05/2021    Procedure: COLONOSCOPY TO CECUM AND TERM. ILEUM WITH COLD POLYPECTOMIES;  Surgeon: Everton Abel MD;  Location: [facility] ENDOSCOPY;  Service: Gastroenterology;  Laterality: N/A;  PRE OP - PERS H/O POLYPS  POST OP - COLON POLYPS,, DIVERTICULOSIS, HEMORRHOIDS    CORONARY ARTERY BYPASS GRAFT N/A 04/11/2017    Procedure: AR STERNOTOMY CORONARY ARTERY BYPASS GRAFT TIMES 3 USING LEFT INTERNAL MAMMARY ARTERY AND LEFT GREATER SAPHENOUS VEIN GRAFT PER ENDOSCOPIC VEIN HARVESTING AND PRP ;  Surgeon: Temo Cortez MD;  Location: [facility] MAIN OR;  Service:     ENDOSCOPY  01/06/2015    HH, Ervin's esophagus    ENDOSCOPY N/A 02/14/2019    Z line irregular, HH, Ervin's esophagus    ENDOSCOPY N/A 11/05/2021    Procedure: ESOPHAGOGASTRODUODENOSCOPY WITH BIOPSIES;  Surgeon: Everton Abel MD;  Location: [facility] ENDOSCOPY;  Service: Gastroenterology;  Laterality: N/A;  PRE OP - PERS H/O ERVIN'S  POST OP - IRREG Z LINE    ENDOSCOPY W/ PEG TUBE PLACEMENT N/A 11/6/2023    Procedure: ESOPHAGOGASTRODUODENOSCOPY WITH PERCUTANEOUS ENDOSCOPIC GASTROSTOMY TUBE INSERTION;  Surgeon: Temo Ramsey MD;  Location: [facility] ENDOSCOPY;  Service: General;  Laterality: N/A;  Pre: dysphagia  Post: same    KNEE SURGERY Left     URETEROSCOPY LASER LITHOTRIPSY WITH STENT INSERTION Left 8/23/2022    Procedure: Cysto retrograde with left uretro stent placement;  Surgeon: Damien Oliveira MD;  Location: [facility] MAIN OR;  Service: Urology;  Laterality: Left;    VASECTOMY         Visit Dx:    ICD-10-CM ICD-9-CM   1. Lymphedema  I89.0 457.1          04/24/24 1500   History   Chief Complaint Swelling;Numbness;Muscle weakness;Difficulty with daily activities;Difficulty Walking   Date Current Problem(s) Began 01/23/24   Brief Description of Current Complaint Pt reports he  began having swelling in his legs earlier this year after 18 day hospitalization related to COVID 19.  No known blood clots or cellulitis.  He was unable to use traditional compression stockings, so he uses zippered stockings that his wife helps him apply and swelling has improved significantly except in feet/ankles which still feel very swollen.  Overall, pt is very deconditioned and weak, would like to work on LE strengthening and balance.   Related/Recent Hospitalizations Yes   Date of Hospitalization 01/23/24   Pain    Pain Location Leg   Pain at Present 8   Pain at Best 8   Pain at Worst 8   Pain Frequency Constant/continuous   Pain Description Dull;Other (Comment)  (weak)   What Performance Factors Make the Current Problem(s) WORSE? Standing, moving around   Difficulties with ADL's? Needs assistance with bathing and dressing, difficulty with standing, sit to stand and ambulation, using WC mostly RW for transfers   Fall Risk Assessment   Any falls in the past year: Yes   Number of falls reported in the last 12 months 5-6   Factors that contributed to the fall: Lost balance   Does patient have a fear of falling Yes (comment)   Services   Are you currently receiving Home Health services No   Do you plan to receive Home Health services in the near future No   Daily Activities   Primary Language English   Are you able to read Yes   Are you able to write Yes   How does patient learn best? Demonstration   Teaching needs identified Management of Condition;Home Exercise Program   Patient is concerned about/has problems with Difficulty with self care (i.e. bathing, dressing, toileting:;Standing;Transfers (getting out of a chair, bed);Walking   Does patient have problems with the following? Anxiety   Pt Participated in POC and Goals Yes   Safety   Are you being hurt, hit, or frightened by anyone at home or in your life? No   Are you being neglected by a caregiver No   Have you had any of the following issues with Anxiety         04/24/24 1651   PT Assessment   Functional Limitations Impaired gait;Limitation in home management;Limitations in community activities;Limitations in functional capacity and performance;Performance in leisure activities;Performance in self-care ADL   Impairments Balance;Edema;Endurance;Gait;Impaired lymphatic circulation;Muscle strength;Motor function;Pain;Sensation   Assessment Comments 78 y.o. male who presents with bilateral Lower Extremity lymphedema secondary to COVID-19.  Presentation is consistent with secondary Stage 1. Lymphedema is present from toes to knees and is characterized by +B Stemmer sign, 2+ pitting edema, preservation of normal LE contours, dry skin.  Impairments include edema, severe LE weakness/deconditioning, gait deviations, balance deficits, decreased transfers, decreased sensation in feet, and pain.  Pt is limited with ability to walk, stand, bath, and dress due to lymphedema and general weakness.  Score on Lymphedema Life Impact Scale is 94.  Due to early stage lymphedema, do not recommend CDT at this time, but did discuss self-MLD, decongestive exercise, use of CCL1 compression garments, and use of Eucerin lotion at home for best management of condition.  Will assist pt with ordering long term velcro compression garments, recommending Juxtalite HD size Medium short.  Do think he would also benefit from outpatient physical therapy for general strengthening and balance training to improve quality of life and reduce fall risk.   Rehab Potential Fair   Patient/caregiver participated in establishment of treatment plan and goals Yes   Patient would benefit from skilled therapy intervention Yes   PT Plan   PT Frequency Other (comment)   Predicted Duration of Therapy Intervention (PT) 2-3 visits as needed for self care training, then re-evaluation as needed for progression of condition.   Planned CPT's? PT EVAL MOD COMPLELITY: 24996;PT RE-EVAL: 74914;PT THER PROC EA 15 MIN: 76957;PT THER  ACT EA 15 MIN: 42786;PT MANUAL THERAPY EA 15 MIN: 87304;PT NEUROMUSC RE-EDUCATION EA 15 MIN: 89865;PT GAIT TRAINING EA 15 MIN: 91500;PT SELF CARE/HOME MGMT/TRAIN EA 15: 86256   PT Plan Comments Order Juxtalite HD compression garments, provide additional self-care training as needed.          04/24/24 1600   PT Short Term Goals   STG Date to Achieve 05/22/24   STG 1 Patient to demonstrate awareness of condition and precautions for improved prevention, management, care of symptoms, and ease of transition to self care of condition.   STG 1 Progress New   STG 2 Patient/family independent with compression garments as indicated for self-management of condition.   STG 2 Progress New   Time Calculation   PT Goal Re-Cert Due Date 07/23/24         Therapy Education  Education Details: Pt was educated regarding components of complete decongestive therapy including skin care, manual lymph drainage, compression bandaging, and decongestive exercise.  Discussed progressive nature of lymphedema and why compression is necessary.  Pt was educated regarding skin care including use of low pH lotion (like Eucerin) as well as performing ankle pumps to improve circulation.  Discussed general strengthening exercises in sitting including LAQ and seated march, encouraged sit to stand and ambulating short distances in home to build endurance.  Would likely benefit from outpatient PT to build strength, endurance, and balance.  Discussed elevating legs to or above level of heart.  Discussed garment recommendation: Juxtalite HD size Medium short for BLE, demonstrated application on model and instructed that target compression is 20-30 mmHg on size guide.  Will order from [employer] and ship to his home using insurance benefits. Pt to return to clinic if needed for further self care training, otherwise recommending outpatient PT for general conditioning and use compression garments daily, replace every 6 months.  Current zippered stockings  are doing okay, but would recommend moving to CCL1 garments for better long term management.  Given: HEP, Symptoms/condition management, Edema management  Program: New  How Provided: Verbal, Demonstration, Written  Provided to: Patient, Other (comment) (Wife)  Level of Understanding: Verbalized  17451 - PT Self Care/Mgmt Minutes: 15             Time Calculation:   Start Time: 1525  Stop Time: 1620  Time Calculation (min): 55 min  Timed Charges  95854 - PT Self Care/Mgmt Minutes: 15  Untimed Charges  PT Eval/Re-eval Minutes: 40  Total Minutes  Timed Charges Total Minutes: 15  Untimed Charges Total Minutes: 40   Total Minutes: 40                   Libby Cavazos, PT  4/29/2024

## 2024-05-09 RX ORDER — AMITRIPTYLINE HYDROCHLORIDE 50 MG/1
50 TABLET, FILM COATED ORAL NIGHTLY
Qty: 30 TABLET | Refills: 1 | Status: SHIPPED | OUTPATIENT
Start: 2024-05-09

## 2024-05-10 ENCOUNTER — TELEPHONE (OUTPATIENT)
Dept: FAMILY MEDICINE CLINIC | Facility: CLINIC | Age: 79
End: 2024-05-10
Payer: MEDICARE

## 2024-05-10 NOTE — TELEPHONE ENCOUNTER
Caller: Brooke Santoro ([facility]CS)    Relationship: Emergency Contact    Best call back number: [phone]    What medication are you requesting: RECOMMENDED MEDICATION    What are your current symptoms: HICCUPS FOR 5 DAYS    How long have you been experiencing symptoms: 5 DAYS    Have you had these symptoms before:    [] Yes  [x] No    Have you been treated for these symptoms before:   [] Yes  [x] No    If a prescription is needed, what is your preferred pharmacy and phone number: [facility] [id number] - [place] [id number] AT [facility] [address]  - [phone]  - [phone] FX     Additional notes:PATIENT'S WIFE IS REQUESTING IF THERE IS A MEDICATION THAT CAN BE CALLED IN. PATIENT MADE APPOINTMENT FOR 5/14/24 THOUGH.

## 2024-05-13 NOTE — TELEPHONE ENCOUNTER
Spoke with patient wife to check on her  patient wife stated that he doing ok and they are going to keep their appt. WithDr. Sanchez rather going to the  to be seen.

## 2024-05-13 NOTE — PROGRESS NOTES
"Subjective   Madhu Santoro is a 78 y.o. male.     CC: Hiccups    History of Present Illness     Pt comes in today reporting ongoing issues with hiccups for > 1 week now.  \"It is driving me nuts\".  Patient's most recent CT scan of the abdomen and pelvis from 2 months ago reviewed by me at today's visit showing a normal liver.    Patient also needs a refill on his allopurinol for his gout, reports no recent gout, and is doing well.    Patient still struggling to recover from his severe COVID illness and prolonged hospitalization.  Patient has chronic weakness and is basically in a wheelchair most of the time.      The following portions of the patient's history were reviewed and updated as appropriate: allergies, current medications, past family history, past medical history, past social history, past surgical history, and problem list.    Review of Systems   Constitutional:  Negative for activity change, chills and fever.   Respiratory:  Negative for cough.    Cardiovascular:  Negative for chest pain.   Psychiatric/Behavioral:  Negative for dysphoric mood.        /58   Pulse 70   Temp 97.6 °F (36.4 °C) (Oral)   Resp 16   Ht 172.7 cm (68\")   Wt 68 kg (150 lb)   SpO2 100%   BMI 22.81 kg/m²     Objective   Physical Exam  Vitals and nursing note reviewed.   Constitutional:       General: He is not in acute distress.     Appearance: He is well-developed.   Pulmonary:      Effort: Pulmonary effort is normal.   Abdominal:      General: Abdomen is flat.      Palpations: Abdomen is soft.      Tenderness: There is no abdominal tenderness.      Comments: G-tube in place and appears normal.   Neurological:      Mental Status: He is alert and oriented to person, place, and time.   Psychiatric:         Behavior: Behavior normal.         Thought Content: Thought content normal.         Assessment & Plan   Diagnoses and all orders for this visit:    1. Intractable hiccups (Primary)  -     Comprehensive Metabolic " Panel  -     chlorproMAZINE (THORAZINE) 25 MG tablet; Take 1 tablet by mouth 3 (Three) Times a Day.  Dispense: 30 tablet; Refill: 1    2. Gout, unspecified cause, unspecified chronicity, unspecified site  -     allopurinol (ZYLOPRIM) 300 MG tablet; Take 1 tablet by mouth Daily. Indications: Disorder of Excessive Uric Acid in the Blood  Dispense: 90 tablet; Refill: 1    3. Weakness  -     Ambulatory Referral to Physical Therapy for Evaluation & Treatment    4. Physical deconditioning  -     Ambulatory Referral to Physical Therapy for Evaluation & Treatment    Discussed the possibility that we may need somebody to look at this G-tube if several attempts at breaking these hiccups are unsuccessful.  Patient and wife voiced understanding.

## 2024-05-14 ENCOUNTER — OFFICE VISIT (OUTPATIENT)
Dept: FAMILY MEDICINE CLINIC | Facility: CLINIC | Age: 79
End: 2024-05-14
Payer: MEDICARE

## 2024-05-14 VITALS
RESPIRATION RATE: 16 BRPM | SYSTOLIC BLOOD PRESSURE: 120 MMHG | DIASTOLIC BLOOD PRESSURE: 58 MMHG | HEART RATE: 70 BPM | WEIGHT: 150 LBS | OXYGEN SATURATION: 100 % | BODY MASS INDEX: 22.73 KG/M2 | TEMPERATURE: 97.6 F | HEIGHT: 68 IN

## 2024-05-14 DIAGNOSIS — R06.6 INTRACTABLE HICCUPS: Primary | ICD-10-CM

## 2024-05-14 DIAGNOSIS — R53.1 WEAKNESS: ICD-10-CM

## 2024-05-14 DIAGNOSIS — R53.81 PHYSICAL DECONDITIONING: ICD-10-CM

## 2024-05-14 DIAGNOSIS — M10.9 GOUT, UNSPECIFIED CAUSE, UNSPECIFIED CHRONICITY, UNSPECIFIED SITE: Chronic | ICD-10-CM

## 2024-05-14 PROCEDURE — 3078F DIAST BP <80 MM HG: CPT | Performed by: FAMILY MEDICINE

## 2024-05-14 PROCEDURE — 1159F MED LIST DOCD IN RCRD: CPT | Performed by: FAMILY MEDICINE

## 2024-05-14 PROCEDURE — 1160F RVW MEDS BY RX/DR IN RCRD: CPT | Performed by: FAMILY MEDICINE

## 2024-05-14 PROCEDURE — 3074F SYST BP LT 130 MM HG: CPT | Performed by: FAMILY MEDICINE

## 2024-05-14 PROCEDURE — 99214 OFFICE O/P EST MOD 30 MIN: CPT | Performed by: FAMILY MEDICINE

## 2024-05-14 PROCEDURE — 1126F AMNT PAIN NOTED NONE PRSNT: CPT | Performed by: FAMILY MEDICINE

## 2024-05-14 RX ORDER — CHLORPROMAZINE HYDROCHLORIDE 25 MG/1
25 TABLET, FILM COATED ORAL 3 TIMES DAILY
Qty: 30 TABLET | Refills: 1 | Status: SHIPPED | OUTPATIENT
Start: 2024-05-14

## 2024-05-14 RX ORDER — ALLOPURINOL 300 MG/1
300 TABLET ORAL DAILY
Qty: 90 TABLET | OUTPATIENT
Start: 2024-05-14

## 2024-05-14 RX ORDER — ALLOPURINOL 300 MG/1
300 TABLET ORAL DAILY
Qty: 90 TABLET | Refills: 1 | Status: SHIPPED | OUTPATIENT
Start: 2024-05-14

## 2024-05-15 LAB
ALBUMIN SERPL-MCNC: 4.3 G/DL (ref 3.5–5.2)
ALBUMIN/GLOB SERPL: 1.6 G/DL
ALP SERPL-CCNC: 126 U/L (ref 39–117)
ALT SERPL-CCNC: 10 U/L (ref 1–41)
AST SERPL-CCNC: 19 U/L (ref 1–40)
BILIRUB SERPL-MCNC: 0.7 MG/DL (ref 0–1.2)
BUN SERPL-MCNC: 18 MG/DL (ref 8–23)
BUN/CREAT SERPL: 20.5 (ref 7–25)
CALCIUM SERPL-MCNC: 10 MG/DL (ref 8.6–10.5)
CHLORIDE SERPL-SCNC: 102 MMOL/L (ref 98–107)
CO2 SERPL-SCNC: 30.9 MMOL/L (ref 22–29)
CREAT SERPL-MCNC: 0.88 MG/DL (ref 0.76–1.27)
EGFRCR SERPLBLD CKD-EPI 2021: 87.5 ML/MIN/1.73
GLOBULIN SER CALC-MCNC: 2.7 GM/DL
GLUCOSE SERPL-MCNC: 70 MG/DL (ref 65–99)
POTASSIUM SERPL-SCNC: 5.8 MMOL/L (ref 3.5–5.2)
PROT SERPL-MCNC: 7 G/DL (ref 6–8.5)
SODIUM SERPL-SCNC: 141 MMOL/L (ref 136–145)

## 2024-05-16 ENCOUNTER — OFFICE VISIT (OUTPATIENT)
Dept: CARDIOLOGY | Facility: CLINIC | Age: 79
End: 2024-05-16
Payer: MEDICARE

## 2024-05-16 VITALS
HEIGHT: 68 IN | DIASTOLIC BLOOD PRESSURE: 68 MMHG | HEART RATE: 72 BPM | SYSTOLIC BLOOD PRESSURE: 122 MMHG | WEIGHT: 153 LBS | OXYGEN SATURATION: 98 % | BODY MASS INDEX: 23.19 KG/M2

## 2024-05-16 DIAGNOSIS — Z95.1 S/P CABG (CORONARY ARTERY BYPASS GRAFT): Primary | ICD-10-CM

## 2024-05-17 NOTE — PROGRESS NOTES
"      CARDIOLOGY    Massimo Jordan MD    ENCOUNTER DATE:  05/16/2024    Madhu Santoro / 78 y.o. / male        CHIEF COMPLAINT / REASON FOR OFFICE VISIT     Coronary artery disease due to lipid rich plaque (03/12/2024 Follow up)      HISTORY OF PRESENT ILLNESS       HPI  Madhu Santoro is a 78 y.o. male who presents today for reevaluation.  Patient is actually doing very well.  Ever since utilization of his compression hose all of his symptoms have resolved and he is feeling good.      The following portions of the patient's history were reviewed and updated as appropriate: allergies, current medications, past family history, past medical history, past social history, past surgical history and problem list.      VITAL SIGNS     Visit Vitals  /68 (BP Location: Left arm)   Pulse 72   Ht 172.7 cm (68\")   Wt 69.4 kg (153 lb)   SpO2 98%   BMI 23.26 kg/m²         Wt Readings from Last 3 Encounters:   05/16/24 69.4 kg (153 lb)   05/14/24 68 kg (150 lb)   04/22/24 68.9 kg (152 lb)     Body mass index is 23.26 kg/m².      REVIEW OF SYSTEMS   ROS        PHYSICAL EXAMINATION     Vitals reviewed.   Constitutional:       Appearance: Healthy appearance.   Pulmonary:      Effort: Pulmonary effort is normal.   Cardiovascular:      Normal rate. Regular rhythm. Normal S1. Normal S2.       Murmurs: There is no murmur.      No gallop.  No click. No rub.   Pulses:     Intact distal pulses.   Edema:     Peripheral edema absent.   Neurological:      Mental Status: Alert.           REVIEWED DATA     Procedures    Cardiac Procedures:      Lipid Panel          7/3/2023    08:51   Lipid Panel   Total Cholesterol 113    Triglycerides 83    HDL Cholesterol 38    VLDL Cholesterol 17    LDL Cholesterol  58          ASSESSMENT & PLAN      Diagnosis Plan   1. S/P CABG (coronary artery bypass graft)              SUMMARY/DISCUSSION  Coronary artery disease.  Clinically stable.  Lower extremity edema.  This has resolved with compression " hose he is feeling great he is doing good he is getting around better.  Patient was told to follow-up in 1 year sooner if issues.        MEDICATIONS         Discharge Medications            Accurate as of May 16, 2024 11:59 PM. If you have any questions, ask your nurse or doctor.                Continue These Medications        Instructions Start Date   allopurinol 300 MG tablet  Commonly known as: ZYLOPRIM   300 mg, Oral, Daily      amitriptyline 50 MG tablet  Commonly known as: ELAVIL   50 mg, Oral, Nightly      apixaban 2.5 MG tablet tablet  Commonly known as: ELIQUIS   2.5 mg, Oral, Every 12 Hours Scheduled      Carafate 1 g tablet  Generic drug: sucralfate   1 g, Oral, Daily, Patient only takes 1 time a day      chlorproMAZINE 25 MG tablet  Commonly known as: THORAZINE   25 mg, Oral, 3 Times Daily      folic acid 1 MG tablet  Commonly known as: FOLVITE   1 mg, Oral, Daily      HYDROcodone-acetaminophen 5-325 MG per tablet  Commonly known as: NORCO   1 tablet, Oral, 3 Times Daily PRN      ipratropium-albuterol 0.5-2.5 mg/3 ml nebulizer  Commonly known as: DUO-NEB   3 mL, Nebulization, Every 4 Hours PRN      lisinopril 5 MG tablet  Commonly known as: PRINIVIL,ZESTRIL   5 mg, Oral, Daily      ondansetron 4 MG tablet  Commonly known as: ZOFRAN   1 tablet, Oral, Every 8 Hours PRN      OSMOLITE 1.5 GIANNI PO   240 Units/oz/day, Gastrostomy Tube, 6 Times Daily      RABEprazole 20 MG EC tablet  Commonly known as: ACIPHEX   20 mg, Oral, Daily      rosuvastatin 20 MG tablet  Commonly known as: Crestor   20 mg, Per G Tube, Nightly                   **Dragon Disclaimer:   Much of this encounter note is an electronic transcription/translation of spoken language to printed text. The electronic translation of spoken language may permit erroneous, or at times, nonsensical words or phrases to be inadvertently transcribed. Although I have reviewed the note for such errors, some may still exist.

## 2024-05-20 ENCOUNTER — TREATMENT (OUTPATIENT)
Dept: PHYSICAL THERAPY | Facility: CLINIC | Age: 79
End: 2024-05-20
Payer: MEDICARE

## 2024-05-20 DIAGNOSIS — M25.552 CHRONIC PAIN OF BOTH HIPS: ICD-10-CM

## 2024-05-20 DIAGNOSIS — M51.37 DDD (DEGENERATIVE DISC DISEASE), LUMBOSACRAL: ICD-10-CM

## 2024-05-20 DIAGNOSIS — R29.898 DECREASED STRENGTH OF LOWER EXTREMITY: Primary | ICD-10-CM

## 2024-05-20 DIAGNOSIS — M25.50 ARTHRALGIA OF MULTIPLE JOINTS: ICD-10-CM

## 2024-05-20 DIAGNOSIS — R26.89 OTHER ABNORMALITIES OF GAIT AND MOBILITY: ICD-10-CM

## 2024-05-20 DIAGNOSIS — M25.551 CHRONIC PAIN OF BOTH HIPS: ICD-10-CM

## 2024-05-20 DIAGNOSIS — G89.29 CHRONIC PAIN OF BOTH HIPS: ICD-10-CM

## 2024-05-20 PROCEDURE — 97110 THERAPEUTIC EXERCISES: CPT

## 2024-05-20 PROCEDURE — 97535 SELF CARE MNGMENT TRAINING: CPT

## 2024-05-20 PROCEDURE — 97161 PT EVAL LOW COMPLEX 20 MIN: CPT

## 2024-05-20 RX ORDER — HYDROCODONE BITARTRATE AND ACETAMINOPHEN 5; 325 MG/1; MG/1
1 TABLET ORAL 3 TIMES DAILY PRN
Qty: 90 TABLET | Refills: 0 | Status: SHIPPED | OUTPATIENT
Start: 2024-05-20

## 2024-05-20 NOTE — PROGRESS NOTES
Physical Therapy Initial Evaluation and Plan of Care  [facility] [address], [address]  [place] [address]     Patient: Madhu Santoro   : 1945  Referring practitioner: Damian Sanchez MD  Date of Initial Visit: 2024  Today's Date: 2024  Patient seen for 1 sessions  PT Clinic location: [address], [address]  [address]          Visit Diagnoses:    ICD-10-CM ICD-9-CM   1. Decreased strength of lower extremity  R29.898 729.89   2. Other abnormalities of gait and mobility  R26.89 781.2       Subjective Questionnaire: LEFS: Give next visit      Subjective Evaluation    History of Present Illness  Mechanism of injury: I have been having back pain for several years and this is a constant pain. My legs feel like they are about 100 pounds and this has been getting worse over the last 7 months when I was hospitalized for COVID. I was in the hospital for 2 months and then rehab for 2 months. I had PT while I was there. I use my WC when I go out in the community but I use the walker when I am around the house.   Prior to all this I was not using any assistive devices and was out doing a lot of yard work. The night I went into the hospital I had gotten up to go to the bathroom and tripped and lowered my self to the ground. I did not hurt anything with this fall but this is when my legs started feeling weak.   I have been supplementing my diet with a Gtube but they are thinking about taking this out soon.   I have a hard time with walking, transferring from sitting to standing, standing, and doing most ADLs.   Before this I was very active working in the yard and in the garage but I did not have any regular exercise routine.       Patient Occupation: Retired Quality of life: good    Pain  Aggravating factors: standing, stairs, sleeping, squatting and lifting    Social Support  Lives in: multiple-level home (Basement-  hasn't been down in 7 months but PLOF was able to do these with no problem)    Patient Goals  Patient goals for therapy: improved balance, return to sport/leisure activities, independence with ADLs/IADLs and increased strength  Patient goal: I want to be able to walk normaly again and do yard work.       Medical history: High cholesterol, CABG, iron deficiency, stroke. See chart for further detail.   Therapy Precautions: balance and gait abnormalities       Objective          Strength/Myotome Testing     Left Hip   Planes of Motion   Flexion: 4-  External rotation: 4-  Internal rotation: 4-    Right Hip   Planes of Motion   Flexion: 4-  External rotation: 4-  Internal rotation: 4-    Left Knee   Flexion: 4-  Extension: 4    Right Knee   Flexion: 4-  Extension: 4    Ambulation     Ambulation: Level Surfaces   Ambulation with assistive device: independent    Additional Level Surfaces Ambulation Details  Patient ambulates with a shuffling gait while using a rolling walker but while carrying it or using a cane he seems to have better stride length, still limited but not shuffling    Functional Assessment     Comments  30 sec STS: 6x with B UE use and crouched postioning, walker anteriorly placed for safety  TU.03 seconds while carrying walker, decreased jemal and stride length---40.84 sec while using rolling walker and shuffling gait is noted, turns are difficult and slow          Assessment & Plan       Assessment  Impairments: abnormal coordination, abnormal gait, activity intolerance, impaired balance, impaired physical strength, lacks appropriate home exercise program and safety issue   Functional limitations: lifting, walking, pulling, pushing, standing, stooping and reaching overhead   Assessment details: Pt is a 78 year old male who presents to PT with symptoms consistent with B LE deconditioning and weaknesses. Upon initial evaluation patient presents with the following deficits and impairments: bilateral  LE weakness, decreased tolerance to activity, balance and gait deficits, and a difficult time with AD. These deficits and impairments make it difficult for the patient to perform most ADLs such as getting dressed showering, standing, walking, and doing house hold and yard work. His biggest goal is to be able to ambulate without an AD as he was prior to his hospitalization. Pt would benefit from skilled PT intervention to address the deficits noted and to improve overall safety and quality of life.     Prognosis: good    Goals  Plan Goals: SHORT TERM GOALS:   6 weeks  1. Pt will be compliant with HEP.  2. Pt will have TUG score of 25 sec or less with quad cane in order to decrease risk for falls.  3.  Pt to perform 8 x STS in 30 sec to demonstrate improve safety with positional changes.  4. Pt will be able to ambulate for 150 ft without rest break due to improved endurance and strength.  5. Pt will demonstrate 4-/5 B LE strength in order to improve gait, transfers and stair climbing ability.      LONG TERM GOALS:  12 weeks  1.Pt will be able to ambulate for 200 ft or greater with improve step length and heel strike in order to decrease fall risk.  2. Pt will be able to climb 10 steps due to improved strength.  3. Pt will be able to perform 30 sit-stand without use of UE due to improved strength.  4. Pt will be able to stand on foam or in tandem stance for 30 sec or greater with eyes open due to improved balance.  5. Pt will improve TUG score to 10 sec or less due to improved gait mechanics, strength and balance.      Plan  Therapy options: will be seen for skilled therapy services  Planned modality interventions: cryotherapy and thermotherapy (hydrocollator packs)  Planned therapy interventions: abdominal trunk stabilization, ADL retraining, body mechanics training, balance/weight-bearing training, flexibility, functional ROM exercises, gait training, home exercise program, neuromuscular re-education, motor  coordination training, postural training, soft tissue mobilization, strengthening, stretching, therapeutic activities, transfer training and IADL retraining  Frequency: 2x week  Duration in weeks: 12  Treatment plan discussed with: patient        See flowsheets for treatment detail.  Education: Discussed the importance of strength in LE for gait and balance and how quickly strength can be lost with hospital stays. Discussed HEP and POC.     History # of Personal Factors and/or Comorbidities: LOW (0)  Examination of Body System(s): # of elements: LOW (1-2)  Clinical Presentation: STABLE   Clinical Decision Making: LOW       Timed:         Manual Therapy:    -     mins  35161;     Therapeutic Exercise:    17     mins  99812;     Neuromuscular Ankush:    -    mins  08630;    Therapeutic Activity:     -     mins  59357;     Gait Training:           mins  31435;     Ultrasound:          mins  43389;    Ionto                                   mins   24549  Self Care                       8     mins   60311      Un-Timed:  Electrical Stimulation:         mins  33405 ( );  Dry Needling          mins self-pay  Traction          mins 91369  Low Eval     28     Mins  94834  Mod Eval     -     Mins  84411  High Eval                       -     Mins  12340  Re-Eval                               mins  13835      Timed Treatment:   25   mins   Total Treatment:     53   mins    PT SIGNATURE: Mylene Hernandez PT   [place] PT license #: [id number]  DATE TREATMENT INITIATED: 5/20/2024    Initial Certification  Certification Period: 8/17/2024  I certify that the therapy services are furnished while this patient is under my care.  The services outlined above are required by this patient, and will be reviewed every 90 days.    PHYSICIAN: Damian Sanchez MD  NPI: [id number]                                      DATE:     Please sign and return via fax to [place] - Fax #: [phone]. Thank you, [facility].

## 2024-05-20 NOTE — TELEPHONE ENCOUNTER
Medication Refill Request    Date of phone call: 5/20/2024    Medication being requested: hydro/apap 5-325 mg sig: take 1 tab TID prn  Qty: 90    Date of last visit: 4/22/2024    Date of last refill: 4/22/2024    RUTH up to date?: yes    Next Follow up?: 6/24/2024    Any new pertinent information? (i.e, new medication allergies, new use of medications, change in patient's health or condition, non-compliance or inconsistency with prescribing agreement?):

## 2024-05-22 ENCOUNTER — TREATMENT (OUTPATIENT)
Dept: PHYSICAL THERAPY | Facility: CLINIC | Age: 79
End: 2024-05-22
Payer: MEDICARE

## 2024-05-22 DIAGNOSIS — R29.898 DECREASED STRENGTH OF LOWER EXTREMITY: Primary | ICD-10-CM

## 2024-05-22 DIAGNOSIS — I89.0 LYMPHEDEMA: ICD-10-CM

## 2024-05-22 DIAGNOSIS — R26.89 OTHER ABNORMALITIES OF GAIT AND MOBILITY: ICD-10-CM

## 2024-05-22 NOTE — PROGRESS NOTES
Physical Therapy Daily Treatment Note    [facility] [place]  [address], [address]  [place] [id number]    Visit # 2        Patient: Madhu Santoro   : 1945  Referring practitioner: Damian Sanchez MD  Date of Initial Evaluation:  Type: THERAPY  Noted: 2024  Today's Date: 2024           ICD-10-CM ICD-9-CM   1. Decreased strength of lower extremity  R29.898 729.89   2. Lymphedema  I89.0 457.1   3. Other abnormalities of gait and mobility  R26.89 781.2       Subjective  Madhu Santoro reports:   I didn't think those exercises would be very challenging, but they really are!  I did a set of them earlier today before coming in.      Objective   See Exercise, Manual, and Modality Logs for complete treatment   Note: any exercises/interventions not performed today have been marked as deferred on flowsheets.     Pt Education:  HEP review  Exercise rationale/ pain free exercise performance  Anatomy and structure of affected musculature  Posture/Postural awareness  Lumbar support  Sleeping positions with pillows  Alternate exercise positions  Verbal/Tactile cues to ensure correct exercise performance/technique    Assessment/Plan  Tolerated continued progression of therapeutic exercise/therapeutic activity well today, no increased pain reported during or after session.  Found attempted sit to stand with no UE assist challenging, able to perform from elevated table surface beginning > 90 degrees.      Did require some review and cueing of previously issued HEP for proper performance. Pt demonstrates improved performance of HEP after review and practice.     Would continue to benefit from skilled PT progressing with functional WB and strength.     Progress per Plan of Care and Progress strengthening /stabilization /functional activity      Timed:         Manual Therapy:         mins  42296     Therapeutic Exercise:     20    mins  88577     Neuromuscular Ankush:         mins  84952    Therapeutic Activity:      10    mins  47013     Gait Training:           mins  79709     Ultrasound:          mins  62215    Ionto                                   mins  65070  Self Care                            mins  69254    Un-Timed:  Electrical Stimulation:         mins 08318 ( )  Traction          mins 78988    Timed Treatment:   30   mins   Total Treatment:     40   mins    FREDY Lemus License #[id number]  Physical Therapist Assistant

## 2024-05-30 ENCOUNTER — TREATMENT (OUTPATIENT)
Dept: PHYSICAL THERAPY | Facility: CLINIC | Age: 79
End: 2024-05-30
Payer: MEDICARE

## 2024-05-30 DIAGNOSIS — R29.898 DECREASED STRENGTH OF LOWER EXTREMITY: Primary | ICD-10-CM

## 2024-05-30 NOTE — PROGRESS NOTES
Physical Therapy Daily Treatment Note  [facility] [place]  [address], [address]  [place] [address]     Patient: Madhu Santoro  : 1945  Referring practitioner: Damian Sanchez MD  Today's Date: 2024    VISIT#: 3    Visit Diagnoses:    ICD-10-CM ICD-9-CM   1. Decreased strength of lower extremity  R29.898 729.89       Subjective   Madhu Santoro reports: Patient reports that he tried walking 5 minutes a day but it is too challenging, he makes it a minute and then has to stop. He reports that his legs just shake sometimes and it seems like his legs and brain don't connect.       Objective       See Exercise, Manual, and Modality Logs for complete treatment.     Patient Education: HEP review  Exercise rationale/ pain free exercise performance  Alternate exercise positions  Verbal/Tactile cues to ensure correct exercise performance/technique       Assessment/Plan  Patient demonstrates good tolerance to therapeutic exercises on this date with report of general fatigue and lower extremity pain throughout. Added walking with a cane, he was able to complete about 500 feet before needing to sit down. He ambulates with decreased stride length and with verbal cues is able to take bigger steps for a couple and then returns to the decreased stride length. He also presents with a shuffling gait when turning or walking through tight spaces. He used a cane on this date and CGA. He carries the cane but rarely touches it down for balance. Continued with supine LE strengthening exercises and STS. Will continue to progress as tolerated.       Progress per Plan of Care and Progress strengthening /stabilization /functional activity          Timed:         Manual Therapy:    -     mins  25542;     Therapeutic Exercise:    10     mins  43831;     Neuromuscular Ankush:    5    mins  61689;    Therapeutic Activity:     15     mins  49311;     Gait Training:           mins  47197;      Ultrasound:          mins  06750;    Ionto:                                   mins  13748  Self Care:                       -     mins  61882    Un-Timed:  Electrical Stimulation:         mins  59268 ( );  Dry Needling          mins self-pay  Traction          mins 46101  Re-Eval                               mins  92967  Group Therapy           ___ mins 40313    Timed Treatment:   30   mins   Total Treatment:     50   mins    Mylene Hernandez PT  Physical Therapist  [place] license #: [id number]

## 2024-06-04 ENCOUNTER — TREATMENT (OUTPATIENT)
Dept: PHYSICAL THERAPY | Facility: CLINIC | Age: 79
End: 2024-06-04
Payer: MEDICARE

## 2024-06-04 ENCOUNTER — TELEPHONE (OUTPATIENT)
Dept: NEUROLOGY | Facility: CLINIC | Age: 79
End: 2024-06-04

## 2024-06-04 DIAGNOSIS — R29.898 DECREASED STRENGTH OF LOWER EXTREMITY: Primary | ICD-10-CM

## 2024-06-04 PROCEDURE — 97112 NEUROMUSCULAR REEDUCATION: CPT

## 2024-06-04 PROCEDURE — 97530 THERAPEUTIC ACTIVITIES: CPT

## 2024-06-04 NOTE — PROGRESS NOTES
"Physical Therapy Daily Treatment Note  [facility] [place]  [address], [address]  [place] [address]     Patient: Madhu Santoro  : 1945  Referring practitioner: Damian Sanchez MD  Today's Date: 2024    VISIT#: 4    Visit Diagnoses:    ICD-10-CM ICD-9-CM   1. Decreased strength of lower extremity  R29.898 729.89       Subjective   Madhu Santoro reports: Patient reports that he is doing okay today, he is frustrated because his legs just \"don't work.\" When he goes to turn his legs just shake and won't move.      Objective       See Exercise, Manual, and Modality Logs for complete treatment.     Patient Education: HEP review  Exercise rationale/ pain free exercise performance  Alternate exercise positions  Verbal/Tactile cues to ensure correct exercise performance/technique       Assessment/Plan  Patient demonstrates good tolerance to therapeutic exercises on this date with report of muscle fatigue through out. Worked on gait with a cane today, with CGA and verbal cueing for proper coordination with the cane and to take bigger steps. He presents with shuffling gait and occasional festinating gait especially when turning corners. Discussed with patient and his wife different signs and symptoms of Parkinson's and suggest trying to get an appointment with his neurologist as a follow up. Continued with LE strengthening exercises, he shows good understanding of HEP but reports that they are still very challenging. Practiced step by step log rolling to transfer from supine to sitting, he performs these transfers with a lot more ease after reviewing this technique. Continue to progress as tolerated.       Progress per Plan of Care and Progress strengthening /stabilization /functional activity          Timed:         Manual Therapy:    -     mins  14204;     Therapeutic Exercise:    7     mins  52875;     Neuromuscular Ankush:    8    mins  24804;    Therapeutic Activity: "     10     mins  32940;     Gait Training:           mins  88919;     Ultrasound:          mins  47021;    Ionto:                                   mins  05377  Self Care:                       5     mins  22899    Un-Timed:  Electrical Stimulation:         mins  63840 ( );  Dry Needling          mins self-pay  Traction          mins 95456  Re-Eval                               mins  58605  Group Therapy           ___ mins 91146    Timed Treatment:   30   mins   Total Treatment:     48   mins    Mylene Hernandez PT  Physical Therapist  [place] license #: [id number]

## 2024-06-04 NOTE — TELEPHONE ENCOUNTER
Caller: Brooke Santoro (HCS)    Relationship to patient: Emergency Contact    Best call back number: [phone]    Chief complaint:   PT NEEDING A FU APPT     Type of visit: FU    Requested date: FIRST AVAILABLE (MORNINGS ARE NOT GOOD FOR THE PT. 1PM OR LATER IS BEST)    If rescheduling, when is the original appointment:   2-12-24     Additional notes: PT WAS JUST RELEASED FROM REHAB AND PT DIDN'T HAVE A WHEELCHAIR TO KEEP HIS 2-12-24.    PLEASE CALL [facility] TO RESCHEDULE AS HUB DOESN'T SCHEDULE FOR MAGUI.

## 2024-06-06 ENCOUNTER — TREATMENT (OUTPATIENT)
Dept: PHYSICAL THERAPY | Facility: CLINIC | Age: 79
End: 2024-06-06
Payer: MEDICARE

## 2024-06-06 DIAGNOSIS — R29.898 DECREASED STRENGTH OF LOWER EXTREMITY: Primary | ICD-10-CM

## 2024-06-06 NOTE — PROGRESS NOTES
Physical Therapy Daily Treatment Note  [facility] Therapy [place]  [address], [address]  [place] [address]     Patient: Madhu Santoro  : 1945  Referring practitioner: Damian Sanchez MD  Today's Date: 2024    VISIT#: 5    Visit Diagnoses:    ICD-10-CM ICD-9-CM   1. Decreased strength of lower extremity  R29.898 729.89       Subjective   Madhu Santoro reports: Patient reports that he is a little sore today, he thinks he has been pushing it a little too much. I have been doing more walking at home with a cane and go until I feel tired but now I am very sore.       Objective       See Exercise, Manual, and Modality Logs for complete treatment.     Patient Education: HEP review  Exercise rationale/ pain free exercise performance  Alternate exercise positions  Verbal/Tactile cues to ensure correct exercise performance/technique       Assessment/Plan  Patient demonstrates good tolerance to therapeutic exercises on this date with no report of pain or soreness throughout. However, at the end he reports that he is pretty sore and fatigued. Continued with supine exercises on this date and deferred STS and ambulation for this visit but will resume next visit. Discussed taking it easier this weekend and then when he starts walking again at home starting off with smaller distances to begin with and the gradually increasing distance. Will continue to progress as tolerated.       Progress per Plan of Care and Progress strengthening /stabilization /functional activity          Timed:         Manual Therapy:    -     mins  01413;     Therapeutic Exercise:    20     mins  32668;     Neuromuscular Ankush:    10    mins  35186;    Therapeutic Activity:     -     mins  66029;     Gait Training:           mins  50818;     Ultrasound:          mins  30168;    Ionto:                                   mins  47538  Self Care:                       5     mins  75282    Un-Timed:  Electrical  Stimulation:         mins  80689 ( );  Dry Needling          mins self-pay  Traction          mins 96984  Re-Eval                               mins  93416  Group Therapy           ___ mins 99824    Timed Treatment:   35   mins   Total Treatment:     44   mins    Mylene Hernandez PT  Physical Therapist  [place] [facility] license #: [id number]

## 2024-06-07 ENCOUNTER — CLINICAL SUPPORT (OUTPATIENT)
Dept: ONCOLOGY | Facility: HOSPITAL | Age: 79
End: 2024-06-07
Payer: MEDICARE

## 2024-06-07 ENCOUNTER — LAB (OUTPATIENT)
Dept: LAB | Facility: HOSPITAL | Age: 79
End: 2024-06-07
Payer: MEDICARE

## 2024-06-07 DIAGNOSIS — D45 POLYCYTHEMIA VERA: ICD-10-CM

## 2024-06-07 LAB
BASOPHILS # BLD AUTO: 0.1 10*3/MM3 (ref 0–0.2)
BASOPHILS NFR BLD AUTO: 0.4 % (ref 0–1.5)
DEPRECATED RDW RBC AUTO: 53.8 FL (ref 37–54)
EOSINOPHIL # BLD AUTO: 0.71 10*3/MM3 (ref 0–0.4)
EOSINOPHIL NFR BLD AUTO: 2.7 % (ref 0.3–6.2)
ERYTHROCYTE [DISTWIDTH] IN BLOOD BY AUTOMATED COUNT: 21.6 % (ref 12.3–15.4)
HCT VFR BLD AUTO: 46.4 % (ref 37.5–51)
HGB BLD-MCNC: 13 G/DL (ref 13–17.7)
IMM GRANULOCYTES # BLD AUTO: 0.41 10*3/MM3 (ref 0–0.05)
IMM GRANULOCYTES NFR BLD AUTO: 1.6 % (ref 0–0.5)
LYMPHOCYTES # BLD AUTO: 2.32 10*3/MM3 (ref 0.7–3.1)
LYMPHOCYTES NFR BLD AUTO: 9 % (ref 19.6–45.3)
MCH RBC QN AUTO: 20.7 PG (ref 26.6–33)
MCHC RBC AUTO-ENTMCNC: 28 G/DL (ref 31.5–35.7)
MCV RBC AUTO: 74 FL (ref 79–97)
MONOCYTES # BLD AUTO: 2.01 10*3/MM3 (ref 0.1–0.9)
MONOCYTES NFR BLD AUTO: 7.8 % (ref 5–12)
NEUTROPHILS NFR BLD AUTO: 20.33 10*3/MM3 (ref 1.7–7)
NEUTROPHILS NFR BLD AUTO: 78.5 % (ref 42.7–76)
NRBC BLD AUTO-RTO: 0 /100 WBC (ref 0–0.2)
PLATELET # BLD AUTO: 628 10*3/MM3 (ref 140–450)
PMV BLD AUTO: 9.9 FL (ref 6–12)
RBC # BLD AUTO: 6.27 10*6/MM3 (ref 4.14–5.8)
WBC NRBC COR # BLD AUTO: 25.88 10*3/MM3 (ref 3.4–10.8)

## 2024-06-07 PROCEDURE — 36415 COLL VENOUS BLD VENIPUNCTURE: CPT

## 2024-06-07 PROCEDURE — 85025 COMPLETE CBC W/AUTO DIFF WBC: CPT

## 2024-06-07 NOTE — PROGRESS NOTES
Patient contacted via telephone for RN Review. CBC reviewed, counts are stable for this patient at this time. Reviewed with pt's wife. Follow up appointment reviewed. Patient is instructed to call the office with any concerns or new symptoms prior to next visit. Patient verbalized understanding.

## 2024-06-11 ENCOUNTER — TREATMENT (OUTPATIENT)
Dept: PHYSICAL THERAPY | Facility: CLINIC | Age: 79
End: 2024-06-11
Payer: MEDICARE

## 2024-06-11 DIAGNOSIS — I89.0 LYMPHEDEMA: ICD-10-CM

## 2024-06-11 DIAGNOSIS — R26.89 OTHER ABNORMALITIES OF GAIT AND MOBILITY: ICD-10-CM

## 2024-06-11 DIAGNOSIS — R29.898 DECREASED STRENGTH OF LOWER EXTREMITY: Primary | ICD-10-CM

## 2024-06-11 NOTE — PROGRESS NOTES
Physical Therapy Daily Treatment Note  [facility] Physical Therapy [place]  [address], [address]  [place] [address]     Patient: Madhu Santoro  : 1945  Referring practitioner: Damian Sanchez MD  Today's Date: 2024    VISIT#: 6    Visit Diagnoses:    ICD-10-CM ICD-9-CM   1. Decreased strength of lower extremity  R29.898 729.89   2. Lymphedema  I89.0 457.1   3. Other abnormalities of gait and mobility  R26.89 781.2       Subjective   Madhu Santoro reports: Patient reports that he is not doing well, more because he is getting frustrated with all of this and not being able to do what he is used to doing. He reports that he got a neurology appointment for the end of the appointment.       Objective       See Exercise, Manual, and Modality Logs for complete treatment.     Patient Education: HEP review  Exercise rationale/ pain free exercise performance  Alternate exercise positions  Verbal/Tactile cues to ensure correct exercise performance/technique       Assessment/Plan  Patient demonstrated good tolerance to therapeutic exercises on this date. We focused on big movements especially with ambulation. Used cones as visual cues for a reminder to increase stride length. Verbal cueing was provided for patterning with the walker and stepping. Also, focused on taking wide turns while turning around, with less festinating gait with cues for the increased stride length He reports general fatigue throughout. Continued with LE strengthening exercises.      Progress per Plan of Care and Progress strengthening /stabilization /functional activity          Timed:         Manual Therapy:    -     mins  42537;     Therapeutic Exercise:    10     mins  73285;     Neuromuscular Ankush:    20    mins  09424;    Therapeutic Activity:     -     mins  53567;     Gait Training:           mins  48315;     Ultrasound:          mins  41923;    Ionto:                                   mins  28989  Self  Care:                       -     mins  53365    Un-Timed:  Electrical Stimulation:         mins  14979 ( );  Dry Needling          mins self-pay  Traction          mins 20001  Re-Eval                               mins  80011  Group Therapy           ___ mins 53064    Timed Treatment:   30   mins   Total Treatment:     58   mins    Mylene Hernandez PT  Physical Therapist  [facility] license #: [id number]

## 2024-06-13 ENCOUNTER — TREATMENT (OUTPATIENT)
Dept: PHYSICAL THERAPY | Facility: CLINIC | Age: 79
End: 2024-06-13
Payer: MEDICARE

## 2024-06-13 DIAGNOSIS — R29.898 DECREASED STRENGTH OF LOWER EXTREMITY: Primary | ICD-10-CM

## 2024-06-13 NOTE — PROGRESS NOTES
Physical Therapy Daily Treatment Note  [facility] Therapy [place]  [address], [address]  [place] [address]     Patient: Madhu Santoro  : 1945  Referring practitioner: Damian Sanchez MD  Today's Date: 2024    VISIT#: 7    Visit Diagnoses:    ICD-10-CM ICD-9-CM   1. Decreased strength of lower extremity  R29.898 729.89       Subjective   Madhu Santoro reports: Patient reports that he has been more shaky the last two days. He tries to walk with his walker up in the air which helps him take bigger steps.       Objective       See Exercise, Manual, and Modality Logs for complete treatment.     Patient Education: HEP review  Exercise rationale/ pain free exercise performance  Alternate exercise positions  Verbal/Tactile cues to ensure correct exercise performance/technique       Assessment/Plan  Patient demonstrates good tolerance to therapeutic exercises on this date with general report of fatigue. With gait activity he demonstrates a decreased stance time on the right LE, decreasing his stride with the L. Verbal cues were provided with bridges to keep hips level, even with cues and visual cues he was elevating his right side more than the left. He was fatigues at the end of the session. Patient also seems to walk better without the walker.       Progress per Plan of Care and Progress strengthening /stabilization /functional activity          Timed:         Manual Therapy:    -     mins  35049;     Therapeutic Exercise:    8     mins  46143;     Neuromuscular Ankush:    15    mins  68699;    Therapeutic Activity:     7     mins  65908;     Gait Training:           mins  30860;     Ultrasound:          mins  14615;    Ionto:                                   mins  62052  Self Care:                       -     mins  83470    Un-Timed:  Electrical Stimulation:         mins  86823 ( );  Dry Needling          mins self-pay  Traction          mins 06756  Re-Eval                                mins  78851  Group Therapy           ___ mins 21005    Timed Treatment:   30   mins   Total Treatment:     59   mins    Mlyene Hernandez PT  Physical Therapist  [place] [facility] license #: [id number]

## 2024-06-17 ENCOUNTER — TELEPHONE (OUTPATIENT)
Dept: NEUROLOGY | Facility: CLINIC | Age: 79
End: 2024-06-17
Payer: MEDICARE

## 2024-06-17 NOTE — TELEPHONE ENCOUNTER
S/w Brooke, healthcare surrogate..  Advised appt on 6/24/24 will be at our new office, [address] [facility] [address], 4th floor

## 2024-06-24 ENCOUNTER — OFFICE VISIT (OUTPATIENT)
Dept: PAIN MEDICINE | Facility: CLINIC | Age: 79
End: 2024-06-24
Payer: MEDICARE

## 2024-06-24 VITALS
DIASTOLIC BLOOD PRESSURE: 63 MMHG | RESPIRATION RATE: 18 BRPM | TEMPERATURE: 97.3 F | OXYGEN SATURATION: 97 % | SYSTOLIC BLOOD PRESSURE: 131 MMHG | HEIGHT: 68 IN | WEIGHT: 154.8 LBS | BODY MASS INDEX: 23.46 KG/M2 | HEART RATE: 74 BPM

## 2024-06-24 DIAGNOSIS — M25.50 ARTHRALGIA OF MULTIPLE JOINTS: ICD-10-CM

## 2024-06-24 DIAGNOSIS — M54.50 CHRONIC BILATERAL LOW BACK PAIN WITHOUT SCIATICA: Primary | ICD-10-CM

## 2024-06-24 DIAGNOSIS — M51.37 DDD (DEGENERATIVE DISC DISEASE), LUMBOSACRAL: ICD-10-CM

## 2024-06-24 DIAGNOSIS — G89.29 CHRONIC PAIN OF BOTH HIPS: ICD-10-CM

## 2024-06-24 DIAGNOSIS — M25.552 CHRONIC PAIN OF BOTH HIPS: ICD-10-CM

## 2024-06-24 DIAGNOSIS — M25.551 CHRONIC PAIN OF BOTH HIPS: ICD-10-CM

## 2024-06-24 DIAGNOSIS — G89.29 CHRONIC BILATERAL LOW BACK PAIN WITHOUT SCIATICA: Primary | ICD-10-CM

## 2024-06-24 PROCEDURE — 1160F RVW MEDS BY RX/DR IN RCRD: CPT

## 2024-06-24 PROCEDURE — 99214 OFFICE O/P EST MOD 30 MIN: CPT

## 2024-06-24 PROCEDURE — 3078F DIAST BP <80 MM HG: CPT

## 2024-06-24 PROCEDURE — 1159F MED LIST DOCD IN RCRD: CPT

## 2024-06-24 PROCEDURE — 3075F SYST BP GE 130 - 139MM HG: CPT

## 2024-06-24 PROCEDURE — 1125F AMNT PAIN NOTED PAIN PRSNT: CPT

## 2024-06-24 RX ORDER — HYDROCODONE BITARTRATE AND ACETAMINOPHEN 5; 325 MG/1; MG/1
1 TABLET ORAL 3 TIMES DAILY PRN
Qty: 90 TABLET | Refills: 0 | Status: SHIPPED | OUTPATIENT
Start: 2024-06-24

## 2024-06-24 NOTE — PROGRESS NOTES
"CHIEF COMPLAINT  Back and joint pain     Subjective   Madhu Santoro is a 78 y.o. male  who presents for follow-up.  He has a history of back and joint pain. He reports that his pain has remained consistent since his last office visit.    Today pain is 8/10VAS in severity. Pain is located in his lumbar spine and throughout multiple joints. He continues to report pain in bilateral hands and numbness to bilateral feet. Describes this pain as a nearly continuous aching, burning, and throbbing. Pain is worsened by prolonged walking or standing, increased physical activity, and bending/twisitng. Pain improves with rest/reposition, use of a heating pad, and medication.     He has COVID/pneumonia in October and has been in and out the hospital due to this. He did inpatient rehab at [facility] and goes to PT twice a week.      Continues with Hydrocodone 5mg 2-3/day and OTC Tylenol PRN. Denies any side side effects from the regimen including somnolence and constipation. The regimen helps \"take the edge off\". Notes improvement in activity and function with regimen. ADL's by self. Denies any bowel or bladder changes.     Not a candidate for NSAIDs - history of TIA's and remains on Eliquis     Continues to have no interest in interventional procedures due to a friend having developed foot drop following a procedure for back pain.      EMG/nerve conduction study ordered by Dr. Russell on 4/27/23 showed moderate to severe peripheral neuropathy. He takes Elavil 50mg at night for neuropathy pain. He also takes Allopurinol for gout (prescribed by Dr. Sanchez).      Unable to tolerate Pregabalin - caused BLE swelling    Back Pain  This is a chronic problem. The current episode started more than 1 year ago. The problem occurs constantly. The problem has been waxing and waning since onset. The pain is present in the lumbar spine. The quality of the pain is described as aching (throbbing). Radiates to: to bilateral legs L > R  The " pain is at a severity of 8/10. The symptoms are aggravated by standing, sitting, twisting, bending, lying down and position (walking long distances, weather changes). Stiffness is present All day. Associated symptoms include numbness (bilateral hands, legs and toes on both feet) and weakness. Pertinent negatives include no abdominal pain, chest pain, dysuria, fever or headaches. He has tried heat and analgesics (rest/reposition, PT in the past (this caused increased pain)) for the symptoms. The treatment provided mild relief.   Joint Pain  This is a chronic (bilateral hands, left shoulder, bilateral knees) problem. The current episode started more than 1 year ago. The problem occurs daily. The problem has been unchanged. Associated symptoms include arthralgias, numbness (bilateral hands, legs and toes on both feet) and weakness. Pertinent negatives include no abdominal pain, chest pain, chills, congestion, coughing, fatigue, fever, headaches or joint swelling. The symptoms are aggravated by walking, standing and bending (weather changes, increased physical activity, ). He has tried oral narcotics, position changes, rest, heat, lying down and acetaminophen for the symptoms. The treatment provided moderate relief.      PEG Assessment   What number best describes your pain on average in the past week?8  What number best describes how, during the past week, pain has interfered with your enjoyment of life?10  What number best describes how, during the past week, pain has interfered with your general activity?  8    Review of Pertinent Medical Data ---  MRI OF THE CERVICAL SPINE WITHOUT CONTRAST AND MRI OF THE THORACIC SPINE  WITHOUT CONTRAST ON 01/11/2024     CLINICAL HISTORY: Imbalance, frequent falls, evaluate for myelopathy.     MRI of the cervical spine technique: Sagittal T1, gradient echo T2 and  fat-suppressed fast spin-echo T2 weighted images were obtained of the  cervical spine. In addition axial gradient echo  and fast spin-echo T2  weighted images were obtained from the skull base to T1. Additional  oblique sagittal T2 weighted images were obtained angled through the  right and left cervical neural foramina.      COMPARISON: There are no prior MRIs of the cervical spine for  comparison.     FINDINGS: The atlantooccipital articulation is within normal limits. The  cervical cord and upper thoracic cord is normal in contour and signal  intensity.     At C1-2 there are mild arthritic changes at the atlantodental interval.  Otherwise the C1-2 level is normal in appearance.     At C2-3 there is mild-moderate right facet overgrowth. The left facets  and the disc space are normal and there is no canal or foraminal  narrowing.     At C3-4 there is mild left but no right facet overgrowth. There is mild  posterior disc bulge and there is no canal or foraminal narrowing.     At C4-5 there is mild disc space narrowing and degenerative endplate  changes. There is right anterior marginal endplate spurring. There is  very minimal posterior endplate spurring. There is minimal canal  narrowing. There is some right-sided uncovertebral joint hypertrophy and  there is mild to moderate right but no left foraminal narrowing.     At C5-6 there is mild disc space narrowing, degenerative endplate  changes. There is minimal posterior endplate spurring, minimal canal  narrowing. The facets are normal. There is mild uncovertebral joint  hypertrophy. There is perhaps minimal right and no left foraminal  narrowing.     At C6-7 there is minimal posterior spurring but no canal narrowing.  The  facets and uncovertebral joints are within normal limits and there is no  foraminal narrowing.     At C7-T1 the disc space and facets are normal with no canal or foraminal  narrowing.     IMPRESSION:  1. There is very mild cervical spondylosis as described with mild disc  space narrowing, degenerative endplate changes from C4-C7, tiny  posterior spurs result in  minimal canal narrowing at C4-5 and C5-6. No  additional canal narrowing is seen in the cervical spine. Uncovertebral  joint spurs result in mild to moderate right bony foraminal narrowing at  C4-5 and perhaps mild right foraminal narrowing at C5-6, no additional  foraminal narrowing seen in the cervical spine. The remainder of the  cervical spine MRI is normal and the etiology of the patient's imbalance  and frequent falls is not established on this exam. There is no evidence  of myelopathic changes in the cervical spinal cord.     MRI OF THE THORACIC SPINE TECHNIQUE: Sagittal T1, proton density and  fat-suppressed T2 weighted images were obtained of the thoracic spine.  In addition axial T1 and T2 weighted images were obtained from T1-L1.     FINDINGS: Thoracic cord is normal in signal intensity. There is mild  prominence of the posterior epidural fat in the thoracic spine from  T3-T10 but overall the thoracic posterior disc margin and facets are  normal with no thoracic disc herniation and no thoracic central canal or  foraminal narrowing identified. Marrow signal intensity in the thoracic  spine is normal.     IMPRESSION:  1. Essentially normal MRI of the thoracic spine with no thoracic disc  herniation, central canal or foraminal narrowing identified. There is  mild prominence of the posterior epidural fat from T3-T10 but there  again is no significant canal narrowing.     This report was finalized on 1/12/2024 6:51 AM by Dr. Christiano Leon M.D  on Workstation: [id number]     The following portions of the patient's history were reviewed and updated as appropriate: allergies, current medications, past family history, past medical history, past social history, past surgical history, and problem list.    Review of Systems   Constitutional:  Negative for chills, fatigue and fever.   HENT:  Negative for congestion.    Respiratory:  Negative for cough.    Cardiovascular:  Negative for chest pain.   Gastrointestinal:   "Negative for abdominal pain, constipation and diarrhea.   Genitourinary:  Negative for difficulty urinating and dysuria.   Musculoskeletal:  Positive for arthralgias and back pain. Negative for joint swelling.   Neurological:  Positive for weakness and numbness (bilateral hands, legs and toes on both feet). Negative for headaches.   Psychiatric/Behavioral:  Negative for sleep disturbance and suicidal ideas. The patient is not nervous/anxious.      I have reviewed and confirmed the accuracy of the ROS as documented by the MA/LPN/RN MY Perez    Vitals:    06/24/24 1310   BP: 131/63   Pulse: 74   Resp: 18   Temp: 97.3 °F (36.3 °C)   SpO2: 97%   Weight: 70.2 kg (154 lb 12.8 oz)   Height: 172.7 cm (68\")   PainSc:   8   PainLoc: Back     Objective   Physical Exam  Constitutional:       Appearance: Normal appearance.   HENT:      Head: Normocephalic.   Cardiovascular:      Rate and Rhythm: Normal rate and regular rhythm.   Pulmonary:      Effort: Pulmonary effort is normal.      Breath sounds: Decreased air movement present.   Musculoskeletal:      Cervical back: Normal range of motion.      Lumbar back: Tenderness present. Decreased range of motion. Positive right straight leg raise test and positive left straight leg raise test.      Comments: Diffuse arthritic changes   Skin:     General: Skin is warm and dry.      Capillary Refill: Capillary refill takes less than 2 seconds.   Neurological:      General: No focal deficit present.      Mental Status: He is alert and oriented to person, place, and time.      Gait: Gait abnormal (arrived via wheelchar).   Psychiatric:         Mood and Affect: Mood normal.         Behavior: Behavior normal.         Thought Content: Thought content normal.         Cognition and Memory: Cognition normal.     -------  Opioid Risk Tool for Male Patients    This tool should be administered to patients upon an initial visit prior to beginning opioid therapy for pain management.  " The ORT has been validated for both male and female patients with chronic pain, and there are specific validated tools for each.      Fam Hx of Substance Abuse:  Alcohol=3, Illegal Drugs=3, Rx Drugs=4   TOTAL: 0  Personal History of Substance Abuse:  Alcohol=3, Illegal Drugs=4, Rx Drugs=5 TOTAL: 0  Age Between 16 - 45 years:  yes=1        TOTAL: 0  History of Preadolescent Sexual Abuse:  yes = N/a in ORT for males    TOTAL: Not Applicable  Psychological Disease:  ADD/OCD/Schizophrenia/Bipolar = 2 ; Depression=1  TOTAL: 0    SCORING TOTALS:          SUM of TOTALS: 0    Interpretation:  - score of 3 or lower indicates low risk for future opioid abuse  - score of 4 to 7 indicates moderate risk for opioid abuse  - score of 8 or higher indicates a high risk for opioid abuse.  - this assessment alone is not the only determining factor in developing a treatment plan    Citation... Dr. Macrina Harris, Pain Med. 2005; 6 (6) : 432.  -------    Assessment & Plan   Diagnoses and all orders for this visit:    1. Chronic bilateral low back pain without sciatica (Primary)    2. DDD (degenerative disc disease), lumbosacral  -     HYDROcodone-acetaminophen (NORCO) 5-325 MG per tablet; Take 1 tablet by mouth 3 (Three) Times a Day As Needed for Severe Pain or Moderate Pain. 30 day supply  Dispense: 90 tablet; Refill: 0    3. Chronic pain of both hips  -     HYDROcodone-acetaminophen (NORCO) 5-325 MG per tablet; Take 1 tablet by mouth 3 (Three) Times a Day As Needed for Severe Pain or Moderate Pain. 30 day supply  Dispense: 90 tablet; Refill: 0    4. Arthralgia of multiple joints  -     HYDROcodone-acetaminophen (NORCO) 5-325 MG per tablet; Take 1 tablet by mouth 3 (Three) Times a Day As Needed for Severe Pain or Moderate Pain. 30 day supply  Dispense: 90 tablet; Refill: 0      Madhu Santoro reports a pain score of 8.  Given his pain assessment as noted, treatment options were discussed and the following options were decided upon as a  follow-up plan to address the patient's pain: continuation of current treatment plan for pain and prescription for opiod analgesics.    --- The urine drug screen confirmation from 4/22/24 has been reviewed and the result is appropriate based on patient history and RUTH report  --- The patient signed an updated copy of the controlled substance agreement on 6/24/24   --- ORT updated -- low risk  --- Continue Hydrocodone. Refill sent to pharmacy. Patient appears stable with current regimen. No adverse effects. Regarding continuation of opioids, there is no evidence of aberrant behavior or any red flags.  The patient continues with appropriate response to opioid therapy. ADL's remain intact by self.   --- Follow-up 2 months or sooner if needed     RUTH REPORT  As part of the patient's treatment plan, I am prescribing controlled substances. The patient has been made aware of appropriate use of such medications, including potential risk of somnolence, limited ability to drive and/or work safely, and the potential for dependence or overdose. It has also been made clear that these medications are for use by this patient only, without concomitant use of alcohol or other substances unless prescribed.     Patient has completed prescribing agreement detailing terms of continued prescribing of controlled substances, including monitoring RUTH reports, urine drug screening, and pill counts if necessary. The patient is aware that inappropriate use will results in cessation of prescribing such medications.    As the clinician, I personally reviewed the RUTH from 6/24/24 while the patient was in the office today.    History and physical exam exhibit continued safe and appropriate use of controlled substances.    Dictated utilizing Dragon dictation.

## 2024-06-25 ENCOUNTER — TREATMENT (OUTPATIENT)
Dept: PHYSICAL THERAPY | Facility: CLINIC | Age: 79
End: 2024-06-25
Payer: MEDICARE

## 2024-06-25 DIAGNOSIS — R29.898 DECREASED STRENGTH OF LOWER EXTREMITY: Primary | ICD-10-CM

## 2024-06-25 NOTE — PROGRESS NOTES
Physical Therapy Daily Treatment Note  [facility] Physical Therapy [place]  [address], [address]  [place] [address]     Patient: Madhu Santoro  : 1945  Referring practitioner: Damian Sanchez MD  Today's Date: 2024    VISIT#: 8    Visit Diagnoses:    ICD-10-CM ICD-9-CM   1. Decreased strength of lower extremity  R29.898 729.89       Subjective   Madhu Santoro reports: Patient reports that he has been having a difficult time with walking the last couple days because of weakness and fatigue in his legs. Saturday and  were the worst. He sees the neurologist tomorrow.       Objective       See Exercise, Manual, and Modality Logs for complete treatment.     Patient Education: HEP review  Exercise rationale/ pain free exercise performance  Alternate exercise positions  Verbal/Tactile cues to ensure correct exercise performance/technique       Assessment/Plan  Patient demonstrates good tolerance to continued and new LE strengthening exercises. Discussed with patient and his wife briefly about the neuroscience with PD and dopamine release. Suggested speaking with the neurologist tomorrow about all his symptoms and what I have seen with PT, the freezing and Festering gait. Added leg press on this date with good tolerance. He reports general fatigue throughout and has some lower back pain with bridges and SLR. Verbal cues were provided with SLR for quad contraction prior to lifting and with bridges to focus on pushing equally through LE to keep pelvis level. Will continue to progress as tolerated.       Progress per Plan of Care and Progress strengthening /stabilization /functional activity          Timed:         Manual Therapy:    -     mins  19016;     Therapeutic Exercise:    20     mins  31269;     Neuromuscular Ankush:    10    mins  14275;    Therapeutic Activity:     10     mins  73937;     Gait Training:           mins  25606;     Ultrasound:          mins  55916;     Ionto:                                   mins  15643  Self Care:                       15     mins  03700    Un-Timed:  Electrical Stimulation:         mins  28494 ( );  Dry Needling          mins self-pay  Traction          mins 88982  Re-Eval                               mins  15255  Group Therapy           ___ mins 28249    Timed Treatment:   55   mins   Total Treatment:     60   mins    Mylene Hernandez PT  Physical Therapist  [place] [facility] license #: [id number]

## 2024-06-26 ENCOUNTER — OFFICE VISIT (OUTPATIENT)
Dept: NEUROLOGY | Facility: CLINIC | Age: 79
End: 2024-06-26
Payer: MEDICARE

## 2024-06-26 VITALS
DIASTOLIC BLOOD PRESSURE: 54 MMHG | HEIGHT: 68 IN | HEART RATE: 85 BPM | BODY MASS INDEX: 23.34 KG/M2 | SYSTOLIC BLOOD PRESSURE: 90 MMHG | RESPIRATION RATE: 18 BRPM | OXYGEN SATURATION: 97 % | WEIGHT: 154 LBS

## 2024-06-26 DIAGNOSIS — G20.A1 PARKINSON'S DISEASE WITHOUT DYSKINESIA OR FLUCTUATING MANIFESTATIONS: Primary | ICD-10-CM

## 2024-06-26 NOTE — PATIENT INSTRUCTIONS
"**Avoid taking your carbidopa/levodopa with food (particularly protein).  Take one hour before or 2 hours after meals.              **May not particularly help with falls, changes in posture, speech, or          sometimes tremor.              **Side effects include nausea and dizziness upon standing.              **At higher doses, may cause excessive movements called dyskinesias,    confusion, or hallucinations.     **Check out the Parkinson's Foundation at parkinson.org or the [employer] at Veenome.org.       **Check out local events at the [facility] at [facility].  Just go to [email or web address] and click on \"Events\".     **Consider increasing exercise with yoga, dance, veronica chi, boxing, or music therapy.     **Consider cognitive exercise as well such as puzzles, word search, Sudoku, etc.     **For constipation, increase water intake, eat fruits and vegetables, whole grains, exercise, consider polythylene glycol (Miralax) or bisacodyl suppository, or abdominal massage.     "

## 2024-06-26 NOTE — PROGRESS NOTES
DOS: 2024  NAME: Madhu Santoro   : 1945  PCP: Damian Sanchez MD    Chief Complaint   Patient presents with    Peripheral neuropathy, idiopathic      SUBJECTIVE  Neurological Problem:  78 y.o. RHW male with a past medical history of HTN, HLD, CAD status post CABG and stent, A-flutter (Eliquis), PAD, orthostatic hypotension, GERD, Mandel's esophagus, history of colonic polyps, history of kidney stones, iron deficiency anemia, polycythemia vera, anxiety, chronic bilateral low back pain, gout, osteoarthritis, history of strokes, peripheral neuropathy, former smoker, history of COVID-19 with long COVID syndrome. They are seen in follow up today for peripheral neuropathy, however the problem is new to the examiner. Patient last seen by Dr. Mukesh Russell in 2023, with a summary of the history taken from the previous note with additions/modifications as indicated. He is accompanied by his spouse Brooke.    Interval History:   Mr. Santoro initially presented to our clinic in 2023, evaluated by Dr. Russell for symptoms of peripheral neuropathy.  He had an EMG conducted in the same month with Dr. Zaragoza which showed a moderate to severe peripheral neuropathy.  He was prescribed amitriptyline nightly which was increased to 50 mg and he found that helpful to help with the paresthesias in his feet.  Peripheral neuropathy labs were checked and showed abnormal immunofixation serum with elevated IgA and IgM levels with lambda specificity.  He was last seen by Dr. Russell in 2023.    He presents today, not for peripheral neuropathy follow-up.  He tells me that he has been seen by physical therapy who is concerned he is exhibiting Parkinson's features and recommended he be evaluated by neurology. He tells me that over the past several months he has found it difficult to  his legs when turning, they feel stuck or frozen he is shuffling with his gait. He is also experiencing a slowness to his  movements and it takes him multiple steps to be able to turn. His wife notices that he has poor coordination of his right arm. The volume of his voice has diminished and he has masked facies.  He does acknowledge that when he stands up at times he feels lightheaded and also feels like he may be falling backwards.  He denies any hallucinations, no drooling or excessive pooling of saliva.  He tells me that he has had 2 falls this year with no injuries.  He has no known family history of Parkinson's disease.  He denies any history of head injury or seizures, no loss of consciousness.  He does have a history of multiple strokes dated in 2021.  Wife says he also has known carotid artery disease.  Recent head imaging was completed in January 2024, showed mild to moderate changes of chronic small vessel ischemic phenomena and old lacunar disease.  There were findings of chronic infarcts within the left MCA distribution.  He denies any facial droop or slurred speech, no visual changes or weakness or numbness to one side of his body.    Review of Systems   HENT:  Positive for voice change.    Musculoskeletal:  Positive for gait problem (shuffle).   Neurological:  Positive for dizziness, weakness and numbness. Negative for tremors, seizures, syncope, facial asymmetry, speech difficulty, light-headedness and headaches.   Psychiatric/Behavioral:  Negative for agitation, behavioral problems, confusion, decreased concentration, dysphoric mood, hallucinations, self-injury, sleep disturbance and suicidal ideas. The patient is not nervous/anxious and is not hyperactive.         The following portions of the patient's history were reviewed and updated as appropriate: allergies, current medications, past family history, past medical history, past social history, past surgical history and problem list.    Current Medications:   Current Outpatient Medications:     allopurinol (ZYLOPRIM) 300 MG tablet, Take 1 tablet by mouth Daily.  Indications: Disorder of Excessive Uric Acid in the Blood, Disp: 90 tablet, Rfl: 1    amitriptyline (ELAVIL) 50 MG tablet, TAKE ONE TABLET BY MOUTH ONCE NIGHTLY, Disp: 30 tablet, Rfl: 1    apixaban (ELIQUIS) 2.5 MG tablet tablet, Take 1 tablet by mouth Every 12 (Twelve) Hours. Indications: Atrial Fibrillation, Disp: , Rfl:     HYDROcodone-acetaminophen (NORCO) 5-325 MG per tablet, Take 1 tablet by mouth 3 (Three) Times a Day As Needed for Severe Pain or Moderate Pain. 30 day supply, Disp: 90 tablet, Rfl: 0    lisinopril (PRINIVIL,ZESTRIL) 5 MG tablet, Take 1 tablet by mouth Daily., Disp: 30 tablet, Rfl: 11    RABEprazole (ACIPHEX) 20 MG EC tablet, Take 1 tablet by mouth Daily., Disp: , Rfl:     rosuvastatin (Crestor) 20 MG tablet, Administer 1 tablet per G tube Every Night., Disp: , Rfl:     carbidopa-levodopa (Sinemet)  MG per tablet, Take 1 tablet by mouth 3 (Three) Times a Day. Take one tablet in the AM and one in the evening for one week, then increase to one tablet in the AM, one tablet in the afternoon and one tablet around bedtime., Disp: 90 tablet, Rfl: 2    chlorproMAZINE (THORAZINE) 25 MG tablet, Take 1 tablet by mouth 3 (Three) Times a Day. (Patient not taking: Reported on 6/26/2024), Disp: 30 tablet, Rfl: 1    folic acid (FOLVITE) 1 MG tablet, Take 1 tablet by mouth Daily. Indications: Anemia From Inadequate Folic Acid (Patient not taking: Reported on 6/26/2024), Disp: , Rfl:     ipratropium-albuterol (DUO-NEB) 0.5-2.5 mg/3 ml nebulizer, Take 3 mL by nebulization Every 4 (Four) Hours As Needed for Shortness of Air. (Patient not taking: Reported on 6/26/2024), Disp: , Rfl:     Nutritional Supplements (OSMOLITE 1.5 GIANNI PO), 240 Units/oz/day by Gastrostomy Tube route 6 (Six) Times a Day. Indications: nutritional support (Patient not taking: Reported on 6/26/2024), Disp: , Rfl:     ondansetron (ZOFRAN) 4 MG tablet, Take 1 tablet by mouth Every 8 (Eight) Hours As Needed. Indications: Nausea and Vomiting  "(Patient not taking: Reported on 6/26/2024), Disp: , Rfl:     sucralfate (Carafate) 1 g tablet, Take 1 tablet by mouth Daily. Patient only takes 1 time a day  Indications: Gastroesophageal Reflux Disease (Patient not taking: Reported on 6/26/2024), Disp: , Rfl:     Current Facility-Administered Medications:     cyanocobalamin injection 1,000 mcg, 1,000 mcg, Intramuscular, Q30 Days, Damian Sanchez MD, 1,000 mcg at 04/06/23 0957  **I did not stop or change the above medications.  Patient's medication list was updated to reflect medications they have reported as currently taking, including medication changes made by other providers.    Objective   Vital Signs:  BP 90/54   Pulse 85   Resp 18   Ht 172.7 cm (67.99\")   Wt 69.9 kg (154 lb)   SpO2 97%   BMI 23.42 kg/m²   Body mass index is 23.42 kg/m².    Physical Exam   Physical Exam:  GENERAL: NAD, alert  HEENT: Normocephalic, atraumatic   Resp: Even and unlabored  Extremities: No edema  Skin: Warm and dry  Psychiatric: Normal mood and affect    Neurological:   MS: AOx3, recent/remote memory intact, normal attention/concentration, language intact, diminished voice volume, no neglect  CN: visual acuity grossly normal, PERRL, EOMI, no nystagmus, no facial droop, no dysarthria  Motor: Bradykinesia all four extremities, cogwheel rigidity bilateral upper extremities R>L. Subtle resting tremor of right hand. No abnormal movements, no asterixis or overshoot. Masked facies.   Trapezius elevation: 5/5  Shoulder abductors 5/5  Elbow flexors 5/5  Elbow extensors 5/5   Left  2+  Right  2+  Hip flexors 5/5  Knee extensors 5/5  Hamstring strength 5/5  Dorsiflexors 5/5  Plantar flexors 5/5  Sensory: Intact to crude touch in all four ext.  Coordination: No dysmetria finger to nose   Rapid alternating movements: Slowed finger to thumb tap  Gait and station: Stooped posture, shuffling gait, reduced arm swing, using walker for assist.  Romberg positive.  Unable to walk in " "tandem step.  Can stand on heels and on toes when holding onto walker, otherwise unable to complete this.    Result Review :  The following data was reviewed by: MY Geller on 06/26/2024:  Laboratory Results:         Lab Results   Component Value Date    WBC 25.88 (H) 06/07/2024    HGB 13.0 06/07/2024    HCT 46.4 06/07/2024    MCV 74.0 (L) 06/07/2024     (H) 06/07/2024     Lab Results   Component Value Date    GLUCOSE 70 05/14/2024    BUN 18 05/14/2024    CREATININE 0.88 05/14/2024    EGFRIFNONA 65 02/08/2022    EGFRIFAFRI 75 02/08/2022    BCR 20.5 05/14/2024    K 5.8 (H) 05/14/2024    CO2 30.9 (H) 05/14/2024    CALCIUM 10.0 05/14/2024    PROTENTOTREF 7.0 05/14/2024    ALBUMIN 4.3 05/14/2024    LABIL2 1.6 05/14/2024    AST 19 05/14/2024    ALT 10 05/14/2024     Lab Results   Component Value Date    HGBA1C 5.50 11/04/2023     Lab Results   Component Value Date    CHOL 106 04/12/2021    CHOL 138 04/10/2017     Lab Results   Component Value Date    HDL 38 (L) 07/03/2023    HDL 38 (L) 01/06/2023    HDL 42 08/10/2021     Lab Results   Component Value Date    LDL 58 07/03/2023    LDL 63 01/06/2023    LDL 68 08/10/2021     Lab Results   Component Value Date    TRIG 83 07/03/2023    TRIG 96 01/06/2023    TRIG 66 08/11/2022     No results found for: \"RPR\"  Lab Results   Component Value Date    TSH 2.130 01/10/2024     Lab Results   Component Value Date    [id number] 752 01/10/2024       Data reviewed : Radiologic studies           Assessment and Plan   Diagnoses and all orders for this visit:    1. Parkinson's disease without dyskinesia or fluctuating manifestations (Primary)  -     carbidopa-levodopa (Sinemet)  MG per tablet; Take 1 tablet by mouth 3 (Three) Times a Day. Take one tablet in the AM and one in the evening for one week, then increase to one tablet in the AM, one tablet in the afternoon and one tablet around bedtime.  Dispense: 90 tablet; Refill: 2      I feel based on Madhu's interview " and exam that he has Parkinson's disease.  Per review of his medication list I do not see anything that could lead to parkinsonism.  We will initiate him on carbidopa levodopa  mg 1 tablet 2 times daily for 1 week then increase to 1 tablet 3 times daily.  Resources were provided to the patient and his spouse on Parkinson's disease today.    We will see Madhu back in 3 months, sooner if symptoms warrant.     MY Geller  [facility]   06/26/24       I spent 40 minutes caring for Madhu on this date of service. This time includes time spent by me in the following activities:preparing for the visit, reviewing tests, obtaining and/or reviewing a separately obtained history, performing a medically appropriate examination and/or evaluation , counseling and educating the patient/family/caregiver, ordering medications, tests, or procedures, referring and communicating with other health care professionals , documenting information in the medical record, independently interpreting results and communicating that information with the patient/family/caregiver, and care coordination  Follow Up   Return in about 3 months (around 9/26/2024).  Patient was given instructions and counseling regarding his condition or for health maintenance advice. Please see specific information pulled into the AVS if appropriate.       Dictated using Dragon Dictation

## 2024-06-27 ENCOUNTER — TREATMENT (OUTPATIENT)
Dept: PHYSICAL THERAPY | Facility: CLINIC | Age: 79
End: 2024-06-27
Payer: MEDICARE

## 2024-06-27 DIAGNOSIS — R26.89 OTHER ABNORMALITIES OF GAIT AND MOBILITY: ICD-10-CM

## 2024-06-27 DIAGNOSIS — R29.898 DECREASED STRENGTH OF LOWER EXTREMITY: Primary | ICD-10-CM

## 2024-07-01 ENCOUNTER — TELEPHONE (OUTPATIENT)
Dept: NEUROLOGY | Facility: CLINIC | Age: 79
End: 2024-07-01
Payer: MEDICARE

## 2024-07-01 NOTE — TELEPHONE ENCOUNTER
Caller: Brooke Santoro (HCS)    Relationship: Emergency Contact    Best call back number: [phone]    What was the call regarding: [facility] STATED THEY RECEIVED A MISSED CALL BUT NO VM.     SHE DID SAY SHE HAS A QUESTION REGARDING THE CARBIDOPA-LEVODOPA THE PT WAS PUT ON THE PHARMACY GAVE HER A SHEET FOR FACTS ON IT. IT STATED THAT THE PT SHOULD NOT HAVE A HIGH PROTEIN DIET. THEY NEED TO KNOW WHAT QUALIFIES AS HIGH PROTEIN OR IF THERE IS A DIET PLAN THAT'S RECOMMENDED TO THE PT.       PLEASE REVIEW  THANK YOU

## 2024-07-02 ENCOUNTER — TREATMENT (OUTPATIENT)
Dept: PHYSICAL THERAPY | Facility: CLINIC | Age: 79
End: 2024-07-02
Payer: MEDICARE

## 2024-07-02 DIAGNOSIS — R26.89 OTHER ABNORMALITIES OF GAIT AND MOBILITY: ICD-10-CM

## 2024-07-02 DIAGNOSIS — I89.0 LYMPHEDEMA: ICD-10-CM

## 2024-07-02 DIAGNOSIS — R29.898 DECREASED STRENGTH OF LOWER EXTREMITY: Primary | ICD-10-CM

## 2024-07-02 NOTE — PROGRESS NOTES
"Physical Therapy Daily Treatment Note  [facility] [place]  [address]  [place] [id number]     Patient: Madhu Santoro  : 1945  Referring practitioner: Damian Sanchez MD  Today's Date: 2024    VISIT#: 10    Visit Diagnoses:    ICD-10-CM ICD-9-CM   1. Decreased strength of lower extremity  R29.898 729.89   2. Other abnormalities of gait and mobility  R26.89 781.2   3. Lymphedema  I89.0 457.1       Subjective   Madhu Santoro reports:  I started a new medicine that the doctor prescribed last week, it made me nauseous but the doctor told me that would happen.  I've been on it a few days now.  I still feel tired today.       Objective   See Exercise, Manual, and Modality Logs for complete treatment.   Note: any exercises/interventions not performed today have been marked as deferred on flowsheets.     Patient Education: HEP review  Exercise rationale/ pain free exercise performance  Alternate exercise positions  Verbal/Tactile cues to ensure correct exercise performance/technique       Assessment/Plan  Patient demonstrates good tolerance to program today,  did appear improved tolerance to activity overall today vs last session.  We practiced addressing freezing instances during turns with pausing the motion and \"resetting\".      Progress per Plan of Care and Progress strengthening /stabilization /functional activity          Timed:         Manual Therapy:    -     mins  06801;     Therapeutic Exercise:    10     mins  43502;     Neuromuscular Ankush:   18    mins  00395;    Therapeutic Activity:     12    mins  47399;     Gait Training:           mins  45944;     Ultrasound:          mins  40090;    Ionto:                                   mins  01111  Self Care:                            mins  96223    Un-Timed:  Electrical Stimulation:         mins  23328 ( );  Dry Needling          mins self-pay  Traction          mins 53215  Re-Eval              "                  mins  20062  Group Therapy           ___ mins 62151    Timed Treatment:   40   mins   Total Treatment:     55   mins    FREDY Lemus License #[id number]  Physical Therapist Assistant

## 2024-07-05 NOTE — TELEPHONE ENCOUNTER
Spoke with patient's wife and gave her the info from Nisa regarding the protein in pt's diet. She verbalized understanding and was appreciative of the information.

## 2024-07-07 RX ORDER — RABEPRAZOLE SODIUM 20 MG/1
20 TABLET, DELAYED RELEASE ORAL DAILY
Qty: 90 TABLET | Refills: 0 | Status: SHIPPED | OUTPATIENT
Start: 2024-07-07

## 2024-07-09 ENCOUNTER — TREATMENT (OUTPATIENT)
Dept: PHYSICAL THERAPY | Facility: CLINIC | Age: 79
End: 2024-07-09
Payer: MEDICARE

## 2024-07-09 DIAGNOSIS — R26.89 OTHER ABNORMALITIES OF GAIT AND MOBILITY: ICD-10-CM

## 2024-07-09 DIAGNOSIS — R29.898 DECREASED STRENGTH OF LOWER EXTREMITY: Primary | ICD-10-CM

## 2024-07-09 NOTE — PROGRESS NOTES
"Physical Therapy Daily Treatment Note  [facility] [place]  [address], [address]  [place] [address]     Patient: Madhu Santoro  : 1945  Referring practitioner: Damian Sanchez MD  Today's Date: 2024    VISIT#: 11    Visit Diagnoses:    ICD-10-CM ICD-9-CM   1. Decreased strength of lower extremity  R29.898 729.89   2. Other abnormalities of gait and mobility  R26.89 781.2         Subjective   Madhu Santoro reports:  I don't know about these exercises.  I feel more tired after I do them than before.  I have been on that Parkinson's medication for a couple weeks now but I don't see much difference.  I went to my sister's house for the  and it completely wore me out.     Objective   See Exercise, Manual, and Modality Logs for complete treatment.   Note: any exercises/interventions not performed today have been marked as deferred on flowsheets.     Patient Education: HEP review, addition of seated band resisted exercises: hip ABD, hip flexion, UE horiz ABD< diagonals with written instruction   Exercise rationale/ pain free exercise performance  Alternate exercise positions  Verbal/Tactile cues to ensure correct exercise performance/technique       Assessment/Plan  Patient demonstrates good tolerance to seated exercises today, issued for HEP for program variety.  We discussed using a timer instead of counting repetitions in effort to prevent overworking at home and exercising into additional fatigue.       program today,  did appear improved tolerance to activity overall today vs last session.  We practiced addressing freezing instances during turns with pausing the motion and \"resetting\".      Progress per Plan of Care and Progress strengthening /stabilization /functional activity          Timed:         Manual Therapy:    -     mins  08067;     Therapeutic Exercise:    21     mins  70562;     Neuromuscular Ankush:       mins  93236;    Therapeutic " Activity:     9    mins  01623;     Gait Training:           mins  86294;     Ultrasound:          mins  68916;    Ionto:                                   mins  97997  Self Care:                            mins  04423    Un-Timed:  Electrical Stimulation:         mins  86515 ( );  Dry Needling          mins self-pay  Traction          mins 40461  Re-Eval                               mins  39721  Group Therapy           ___ mins 68167    Timed Treatment:   30   mins   Total Treatment:     42   mins    FREDY Lemus License #[id number]  Physical Therapist Assistant

## 2024-07-10 RX ORDER — AMITRIPTYLINE HYDROCHLORIDE 50 MG/1
50 TABLET, FILM COATED ORAL NIGHTLY
Qty: 30 TABLET | Refills: 1 | Status: SHIPPED | OUTPATIENT
Start: 2024-07-10

## 2024-07-11 ENCOUNTER — TREATMENT (OUTPATIENT)
Dept: PHYSICAL THERAPY | Facility: CLINIC | Age: 79
End: 2024-07-11
Payer: MEDICARE

## 2024-07-11 DIAGNOSIS — R26.89 OTHER ABNORMALITIES OF GAIT AND MOBILITY: ICD-10-CM

## 2024-07-11 DIAGNOSIS — R29.898 DECREASED STRENGTH OF LOWER EXTREMITY: Primary | ICD-10-CM

## 2024-07-11 NOTE — PROGRESS NOTES
Physical Therapy Daily Treatment and Progress Note  [facility] [place]  [address], [address]  [place] [address]     Patient: Madhu Santoro  : 1945  Referring practitioner: Damian Sanchez MD  Today's Date: 2024    VISIT#: 12    Visit Diagnoses:    ICD-10-CM ICD-9-CM   1. Decreased strength of lower extremity  R29.898 729.89   2. Other abnormalities of gait and mobility  R26.89 781.2     LEFS:   Subjective   Madhu Santoro reports: Patient reports that he is still very weak all over and in his legs. He has been on a new medicine for PD for a couple weeks and has not see any improvements thus far. He has seen about 4% improvement since beginning PT.       Objective   Strength/Myotome Testing      Left Hip   Planes of Motion   Flexion: 4- (4-)  External rotation: 4-(4)  Internal rotation: 4-(4-)     Right Hip   Planes of Motion   Flexion: 4- (4-)  External rotation: 4-(4)  Internal rotation: 4- (4-)     Left Knee   Flexion: 4- (4)  Extension: 4 (4)     Right Knee   Flexion: 4-(4)  Extension: 4 (4)    Comments  30 sec STS: 6x with B UE use and crouched postioning, walker anteriorly placed for safety (8x with B UE)  TU.03 seconds while carrying walker, decreased jemal and stride length(18.31 sec while carrying walker) ---40.84 sec while using rolling walker and shuffling gait is noted, turns are difficult and slow (31.51 sec with walk, still some festering and shuffling gait when trying to sit back down)    See Exercise, Manual, and Modality Logs for complete treatment.     Patient Education: HEP review  Exercise rationale/ pain free exercise performance  Alternate exercise positions  Verbal/Tactile cues to ensure correct exercise performance/technique       Assessment/Plan  Patient has demonstrated minimal progress with skilled PT interventions thus far however, has recently began medication for PD. TUG and 30 sec STS both have improved demonstrating  increased safety. He still reports weakness with ADLs but is able to walk further without requiring sitting rest breaks. Objectively, TUG, STS, and strength has shown some improvements but continue to demonstrate deficits, Today we started on more functional exercises which he tolerated well and I think will be very beneficial for him. He has met 4/5 STGs and is progressing towards the others. Patient would benefit from continued skilled PT interventions to improve remaining deficits. (Possible discussion of a referral to an outpatient neuro clinic for more expertise with PD.)   Goals  Plan Goals: SHORT TERM GOALS:   6 weeks  1. Pt will be compliant with HEP. (Progressing)  2. Pt will have TUG score of 25 sec or less with quad cane in order to decrease risk for falls. (MET)  3.  Pt to perform 8 x STS in 30 sec to demonstrate improve safety with positional changes.(MET)  4. Pt will be able to ambulate for 150 ft without rest break due to improved endurance and strength. (MET)  5. Pt will demonstrate 4-/5 B LE strength in order to improve gait, transfers and stair climbing ability. (MET)                            LONG TERM GOALS:  12 weeks  1.Pt will be able to ambulate for 200 ft or greater with improve step length and heel strike in order to decrease fall risk. (Progressing)  2. Pt will be able to climb 10 steps due to improved strength. (Progressing)  3. Pt will be able to perform 30 sit-stand without use of UE due to improved strength.(Progressing)  4. Pt will be able to stand on foam or in tandem stance for 30 sec or greater with eyes open due to improved balance.(Progressing)  5. Pt will improve TUG score to 10 sec or less due to improved gait mechanics, strength and balance.(Progressing)    Progress per Plan of Care and Progress strengthening /stabilization /functional activity          Timed:         Manual Therapy:    -     mins  52843;     Therapeutic Exercise:    15     mins  71619;     Neuromuscular Ankush:     10    mins  57734;    Therapeutic Activity:     15     mins  44136;     Gait Training:           mins  95973;     Ultrasound:          mins  98769;    Ionto:                                   mins  42320  Self Care:                       5     mins  32598    Un-Timed:  Electrical Stimulation:         mins  15324 ( );  Dry Needling          mins self-pay  Traction          mins 69356  Re-Eval                               mins  85488  Group Therapy           ___ mins 15068    Timed Treatment:   45   mins   Total Treatment:     58   mins    Mylene Hernandez PT  Physical Therapist  [place] [facility] license #: [id number]

## 2024-07-11 NOTE — LETTER
Physical Therapy Daily Treatment and Progress Note  [facility] [place]  [address], [address]  [place] [address]     Patient: Madhu Santoro  : 1945  Referring practitioner: Damian Sanchez MD  Today's Date: 2024    VISIT#: 12    Visit Diagnoses:    ICD-10-CM ICD-9-CM   1. Decreased strength of lower extremity  R29.898 729.89   2. Other abnormalities of gait and mobility  R26.89 781.2     LEFS:   Subjective   Madhu Santoro reports: Patient reports that he is still very weak all over and in his legs. He has been on a new medicine for PD for a couple weeks and has not see any improvements thus far. He has seen about 4% improvement since beginning PT.       Objective   Strength/Myotome Testing      Left Hip   Planes of Motion   Flexion: 4- (4-)  External rotation: 4-(4)  Internal rotation: 4-(4-)     Right Hip   Planes of Motion   Flexion: 4- (4-)  External rotation: 4-(4)  Internal rotation: 4- (4-)     Left Knee   Flexion: 4- (4)  Extension: 4 (4)     Right Knee   Flexion: 4-(4)  Extension: 4 (4)    Comments  30 sec STS: 6x with B UE use and crouched postioning, walker anteriorly placed for safety (8x with B UE)  TU.03 seconds while carrying walker, decreased jemal and stride length(18.31 sec while carrying walker) ---40.84 sec while using rolling walker and shuffling gait is noted, turns are difficult and slow (31.51 sec with walk, still some festering and shuffling gait when trying to sit back down)    See Exercise, Manual, and Modality Logs for complete treatment.     Patient Education: HEP review  Exercise rationale/ pain free exercise performance  Alternate exercise positions  Verbal/Tactile cues to ensure correct exercise performance/technique       Assessment/Plan  Patient has demonstrated minimal progress with skilled PT interventions thus far however, has recently began medication for PD. TUG and 30 sec STS both have improved demonstrating  increased safety. He still reports weakness with ADLs but is able to walk further without requiring sitting rest breaks. Objectively, TUG, STS, and strength has shown some improvements but continue to demonstrate deficits, Today we started on more functional exercises which he tolerated well and I think will be very beneficial for him. He has met 4/5 STGs and is progressing towards the others. Patient would benefit from continued skilled PT interventions to improve remaining deficits. (Possible discussion of a referral to an outpatient neuro clinic for more expertise with PD.)   Goals  Plan Goals: SHORT TERM GOALS:   6 weeks  1. Pt will be compliant with HEP. (Progressing)  2. Pt will have TUG score of 25 sec or less with quad cane in order to decrease risk for falls. (MET)  3.  Pt to perform 8 x STS in 30 sec to demonstrate improve safety with positional changes.(MET)  4. Pt will be able to ambulate for 150 ft without rest break due to improved endurance and strength. (MET)  5. Pt will demonstrate 4-/5 B LE strength in order to improve gait, transfers and stair climbing ability. (MET)                            LONG TERM GOALS:  12 weeks  1.Pt will be able to ambulate for 200 ft or greater with improve step length and heel strike in order to decrease fall risk. (Progressing)  2. Pt will be able to climb 10 steps due to improved strength. (Progressing)  3. Pt will be able to perform 30 sit-stand without use of UE due to improved strength.(Progressing)  4. Pt will be able to stand on foam or in tandem stance for 30 sec or greater with eyes open due to improved balance.(Progressing)  5. Pt will improve TUG score to 10 sec or less due to improved gait mechanics, strength and balance.(Progressing)    Mylene Hernandez, PT  Physical Therapist  [place] [facility] license #: [id number]

## 2024-07-16 ENCOUNTER — TREATMENT (OUTPATIENT)
Dept: PHYSICAL THERAPY | Facility: CLINIC | Age: 79
End: 2024-07-16
Payer: MEDICARE

## 2024-07-16 DIAGNOSIS — R26.89 OTHER ABNORMALITIES OF GAIT AND MOBILITY: ICD-10-CM

## 2024-07-16 DIAGNOSIS — R29.898 DECREASED STRENGTH OF LOWER EXTREMITY: Primary | ICD-10-CM

## 2024-07-16 NOTE — PROGRESS NOTES
Physical Therapy Daily Treatment Note    [facility] [place]  [address], [address]  [place] [address]    Visit # 13        Patient: Madhu Santoro   : 1945  Referring practitioner: Damian Sanchez MD  Date of Initial Evaluation:  Type: THERAPY  Noted: 2024  Today's Date: 2024           ICD-10-CM ICD-9-CM   1. Decreased strength of lower extremity  R29.898 729.89   2. Other abnormalities of gait and mobility  R26.89 781.2       Subjective  Madhu Santoro reports:   I've been on this new medicine for two weeks now, I feel like I'm getting weaker instead of stronger.    Pt's spouse reports pt has been exercising regularly at home, she thinks he is walking more with his walker and doesn't see him acting more fatigued.    Objective   See Exercise, Manual, and Modality Logs for complete treatment   Note: any exercises/interventions not performed today have been marked as deferred on flowsheets.     Pt Education:  HEP review  Exercise rationale/ pain free exercise performance  Posture/Postural awareness  Alternate exercise positions  Verbal/Tactile cues to ensure correct exercise performance/technique      Assessment/Plan  Tolerated continued progression of therapeutic exercise/therapeutic activity/neuromuscular re-ed and balance well today, no increased pain reported during or after session.  Advised pt/spouse to call neurologit office with questions RE: medications and side effects if for noting more than peace of mind.        Would continue to benefit from skilled PT progressing with functional WB, strength and balance.        Progress per Plan of Care         Timed:         Manual Therapy:         mins  91425     Therapeutic Exercise:     15    mins  71446     Neuromuscular Ankush:    15    mins  87170    Therapeutic Activity:          mins  81340     Gait Training:           mins  95738     Ultrasound:          mins  60848    Ionto                                    mins  80163  Self Care                            mins  37281    Un-Timed:  Electrical Stimulation:         mins 90614 ( )  Traction          mins 73358    Timed Treatment:   30   mins   Total Treatment:     50   mins    FREDY Lemus License #[id number]  Physical Therapist Assistant

## 2024-07-19 ENCOUNTER — TREATMENT (OUTPATIENT)
Dept: PHYSICAL THERAPY | Facility: CLINIC | Age: 79
End: 2024-07-19
Payer: MEDICARE

## 2024-07-19 DIAGNOSIS — R26.89 OTHER ABNORMALITIES OF GAIT AND MOBILITY: ICD-10-CM

## 2024-07-19 DIAGNOSIS — R29.898 DECREASED STRENGTH OF LOWER EXTREMITY: Primary | ICD-10-CM

## 2024-07-19 NOTE — PROGRESS NOTES
"  Physical Therapy Daily Treatment Note    [facility] [place]  [address], [address]  [place] [address]    Visit # 14        Patient: Madhu Santoro   : 1945  Referring practitioner: Damian Sanchez MD  Date of Initial Evaluation:  Type: THERAPY  Noted: 2024  Today's Date: 2024           ICD-10-CM ICD-9-CM   1. Decreased strength of lower extremity  R29.898 729.89   2. Other abnormalities of gait and mobility  R26.89 781.2       Subjective  Madhu Santoro reports:   No changes in his fatigue and weakness, feeling \"about the same\" as last visit.     Objective   See Exercise, Manual, and Modality Logs for complete treatment   Note: any exercises/interventions not performed today have been marked as deferred on flowsheets.     Pt Education:  HEP review  Posture/Postural awareness  Alternate exercise positions  Verbal/Tactile cues to ensure correct exercise performance/technique      Assessment/Plan  Tolerated continued progression of therapeutic exercise/therapeutic activity/neuromuscular re-ed and balance well today, progressing with hurdles and 4\" step, good overall endurance with brief seated rest breaks.     Would continue to benefit from skilled PT progressing with functional WB, strength and balance.        Progress per Plan of Care         Timed:         Manual Therapy:         mins  74309     Therapeutic Exercise:     5    mins  89970     Neuromuscular Ankush:    10    mins  18307    Therapeutic Activity:     15     mins  37926     Gait Training:           mins  79508     Ultrasound:          mins  63198    Ionto                                   mins  78696  Self Care                            mins  97674    Un-Timed:  Electrical Stimulation:         mins 24401 ( )  Traction          mins 12685    Timed Treatment:   30   mins   Total Treatment:     50   mins    Shane Rivero PTA  [place] License #[id number]  Physical Therapist Assistant       "

## 2024-07-22 DIAGNOSIS — M25.552 CHRONIC PAIN OF BOTH HIPS: ICD-10-CM

## 2024-07-22 DIAGNOSIS — G89.29 CHRONIC PAIN OF BOTH HIPS: ICD-10-CM

## 2024-07-22 DIAGNOSIS — M51.37 DDD (DEGENERATIVE DISC DISEASE), LUMBOSACRAL: ICD-10-CM

## 2024-07-22 DIAGNOSIS — M25.551 CHRONIC PAIN OF BOTH HIPS: ICD-10-CM

## 2024-07-22 DIAGNOSIS — M25.50 ARTHRALGIA OF MULTIPLE JOINTS: ICD-10-CM

## 2024-07-22 RX ORDER — HYDROCODONE BITARTRATE AND ACETAMINOPHEN 5; 325 MG/1; MG/1
1 TABLET ORAL 3 TIMES DAILY PRN
Qty: 90 TABLET | Refills: 0 | Status: SHIPPED | OUTPATIENT
Start: 2024-07-22

## 2024-07-23 ENCOUNTER — TREATMENT (OUTPATIENT)
Dept: PHYSICAL THERAPY | Facility: CLINIC | Age: 79
End: 2024-07-23
Payer: MEDICARE

## 2024-07-23 DIAGNOSIS — R26.89 OTHER ABNORMALITIES OF GAIT AND MOBILITY: ICD-10-CM

## 2024-07-23 DIAGNOSIS — R29.898 DECREASED STRENGTH OF LOWER EXTREMITY: Primary | ICD-10-CM

## 2024-07-23 NOTE — PROGRESS NOTES
Physical Therapy Daily Treatment Note  [facility] [place]  [address], [address]  [place] [address]     Patient: Madhu Santoro  : 1945  Referring practitioner: Damian Sanchez MD  Today's Date: 2024    VISIT#: 15    Visit Diagnoses:    ICD-10-CM ICD-9-CM   1. Decreased strength of lower extremity  R29.898 729.89   2. Other abnormalities of gait and mobility  R26.89 781.2       Subjective   Madhu Santoro reports: Patient reports that he is still just always tired and has no energy.       Objective       See Exercise, Manual, and Modality Logs for complete treatment.     Patient Education: HEP review  Exercise rationale/ pain free exercise performance  Alternate exercise positions  Verbal/Tactile cues to ensure correct exercise performance/technique       Assessment/Plan  Patient demonstrates good tolerance to therapeutic exercises on this date with general LE fatigue. Continued with more functional activities and balance activities and he did very well with few rest breaks required. Discussed potentially going to [facility] for outpatient neuro PT as another option for more specific PT for his new PD diagnosis. However, discussed if he wanted to continue with PT here to continue scheduling more. Will continue to progress as tolerated.       Progress per Plan of Care and Progress strengthening /stabilization /functional activity          Timed:         Manual Therapy:    -     mins  79119;     Therapeutic Exercise:    10     mins  23702;     Neuromuscular Ankush:    -    mins  18207;    Therapeutic Activity:     25     mins  35954;     Gait Training:           mins  22125;     Ultrasound:          mins  22194;    Ionto:                                   mins  74083  Self Care:                       -     mins  54126    Un-Timed:  Electrical Stimulation:         mins  90371 ( );  Dry Needling          mins self-pay  Traction          mins  10499  Re-Eval                               mins  79131  Group Therapy           ___ mins 77593    Timed Treatment:   35   mins   Total Treatment:     58   mins    Mylene Hernandez PT  Physical Therapist  [place] REECE license #: [id number]

## 2024-07-23 NOTE — PROGRESS NOTES
REASON FOR FOLLOW-UP: Polycythemia vera, JAK2 mutation detected    HISTORY OF PRESENT ILLNESS:  Mr. Santoro is a 78 y.o. male with the above-mentioned history, who returns to the office after complications following COVID development late last year.  He has been hospitalized several times, and rehab several times and had evidence hemolysis leading to discontinuance of his Hydrea.  He was last seen in mid January stable hematologically.  Unfortunately we did not have additional recommendations as he tried to recover from the infection.        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  Hematologically, fortunately, he remained stable and is off Hydrea.    The patient is next assessed 7/24/2024 making very slow progress in terms of general rehabilitation.  Symptoms of Parkinson's have also progressed despite the use of Sinemet and his performance status is marginal as a result.                                                                                                                           Hematologic/oncologic history:     The patient is a 78-year-old male followed with a number of issues including coronary artery disease, status post CABG, atrial flutter, previous CVA hyperlipidemia, GE reflux, carotid vascular disease, and hypertension.     He had been seen by vascular 2/25/2022 with mild progression of carotid disease but otherwise stable and also had follow-up with pain management for back pain 3/28/2022 requiring chronic narcotic therapy.  Patient has been resistant to epidural like procedures.     Unfortunately has had concurrent constipation requiring laxatives and additional opioid antagonist to reduce GI hypomotility.  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     Patient recently reviewed again by pain management with worsening pain.  Patient also saw GI periodically undergoing a follow-up MRI of the abdomen 8/9/2022 with scattered pancreatic cystic lesions unchanged from previous.  He had previously undergone EGD and colonoscopy 11/5/2021 with irregular Z-line and biopsies taken in nonbleeding internal hemorrhoids found during retroflexion that were small.  There are scattered small and large mouth diverticula found in the sigmoid colon descending colon and splenic flexure.       The patient now presents for thrombocytosis with review of his record over the last year demonstrating a platelet count that is escalated from 3-400,000 now to 8/11/2022 648,000 but concurrent microcytic and hypochromic indices.  These indices have been slowly reducing over the last 2 years approximately followed more closely.        These findings are discussed with the patient 8/16/2022 who indicates that he actually feels about the same though still has issues with back pain.  He is noted no change in bowel habit including, fortunately, improvement of his constipation without the use of additional medication such as Movantik.  He has not noted any change in the color of his stool including dark stools or any blood per rectum, urine though he does note periodic excess bruising from his anticoagulation therapy.       The patient moved to a trial of iron which, unfortunately, worsened his constipation in which he had discontinue. He had further issues in early September with left renal stone, requiring cystoscopy, stent placement 8/23/2022.       He is next seen back 9/28/2022 with repeat studies performed 9/21 demonstrating 5% iron saturation, ferritin of 12.5.  He remains further weight and fatigue, again oral iron intolerant and will require IV iron preparations for his iron deficiency anemia.      The patient is next seen  1/18/2023 with considerable improvement in his testing including H&H now 17.2 and 55.4 though with iron saturation of 8% and ferritin 26.5.  He has not noted any blood loss but remains generally weak and with ongoing periodic abdominal pain.  His major concerns continue to be a disequilibrium since his previous CVA symptoms.  He has not been seen in follow-up by neurology.  He continues to have hematuria by urinary assessment but not gross findings.  We have discussed that he may actually have a myeloproliferative disorder and requested that he undergo an assessment for JAK2 analysis today.    He is seen back 4/17/2023JAK  2-V617 F mutation having been detected.  In the interval he was admitted 2/19-21/23 for weakness, fall and ER evaluation not demonstrate any acute intracranial process, CT of lumbar spine with lumbar degenerative disease and other studies not show any acute abnormalities.  Fortunately he went on to improve though his wife had a positive COVID test recently on home examination.  His follow-up testing 4/10/2023 include an H&H of 18.3 and 60.9 white count of 14,700 platelet count of 477,000.    He is seen 4/17/2023 and we discussed that he is better served with phlebotomy at this point.  He states he feels so poorly that he is quite willing to try to move to additional therapies including phlebotomy.    Patient initiating Hydrea 1000 mg daily as of 5/19/2023.    He is next seen back in office 6/30/2023.  He is gradually seen an improvement in his hemoglobin hematocrit dropping to a normal CBC approximately 6/30/2023 H&H of 14.1 and 46.6, white count of 4800 and platelet count of 146,000.  He is tolerating treatment well without additional side effects.    Patient seen 9/21/2023 with H&H 11.1 and 32.9, white count 5110, platelet count 151,000.  Patient without additional side effect from Hydrea though dose is now reduced to 500 mg every other day.    He was next seen in office 4/12/2024 after  "complications following COVID development late last year.  He has been hospitalized several times, and rehab several times and had evidence hemolysis leading to discontinuance of his Hydrea.  He was last seen in mid January stable hematologically.  Unfortunately we did not have additional recommendations as he tried to recover from the infection.        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  Hematologically, fortunately, he remained stable and is off Hydrea.    He is next seen 7/24/2024 reasonably stable hematologically.  At present no intervention is necessary.                                                                                                                             Past Medical History:   Diagnosis Date    Anxiety     Arthritis     Atrial flutter     Status post cavotricuspid isthmus ablation by Dr. Gustafson on 4/18/17    Mandel esophagus     Benign essential hypertension     CAD (coronary artery disease)     3 vessel CABG 4/11/17 by Dr. Cortez: ROSARIO-prox LAD, SVG-OM1, SVG-OM3    Carotid artery disease     Status post carotid endarterectomy - [facility] 4/10/17: 50-59% NICHELLE, 1-15% LICA.     Colonic polyp     COVID-19 long hauler     Cyst of pancreas     DDD (degenerative disc disease), lumbosacral     GERD (gastroesophageal reflux disease)     H/O bone density study 2013    H/O complete eye exam 2014    History of kidney stone     HLD (hyperlipidemia)     Hypertension     Kidney stone     8/22/22    Lipid screening 05/31/2013    Low back pain     physical therapy [facility] 5-12-10    Screening for prostate cancer 07/07/2015    Skin cancer     nose    Stroke     RESIDUAL--\"BALANCE ISSUES\"        Past Surgical History:   Procedure Laterality Date    CARDIAC CATHETERIZATION N/A 04/10/2017    Procedure: Left Heart Cath;  Surgeon: Marjorie Healy MD;  " Location: [facility] CATH INVASIVE LOCATION;  Service:     CARDIAC CATHETERIZATION N/A 04/10/2017    Procedure: Coronary angiography;  Surgeon: Marjorie Healy MD;  Location: [facility] CATH INVASIVE LOCATION;  Service:     CARDIAC CATHETERIZATION N/A 04/10/2017    Procedure: Left ventriculography;  Surgeon: Marjorie Healy MD;  Location: [facility] CATH INVASIVE LOCATION;  Service:     CARDIAC CATHETERIZATION  2011    CARDIAC ELECTROPHYSIOLOGY PROCEDURE N/A 04/18/2017    Procedure: Ablation atrial flutter;  Surgeon: Jose Antonio Gustafson MD;  Location: [facility] CATH INVASIVE LOCATION;  Service:     CAROTID ENDARTERECTOMY      CHOLECYSTECTOMY      CHOLECYSTECTOMY WITH INTRAOPERATIVE CHOLANGIOGRAM N/A 09/07/2019    Procedure: Laparoscopic cholecystectomy with intraoperative cholangiogram;  Surgeon: Gauri Travis MD;  Location: [facility] OR;  Service: General    COLONOSCOPY  01/06/2015    Diverticulosis, one TA    COLONOSCOPY N/A 02/14/2019    tics, NBIH, adenomatous polyp x 2    COLONOSCOPY N/A 11/05/2021    Procedure: COLONOSCOPY TO CECUM AND TERM. ILEUM WITH COLD POLYPECTOMIES;  Surgeon: Everton Abel MD;  Location: [facility] ENDOSCOPY;  Service: Gastroenterology;  Laterality: N/A;  PRE OP - PERS H/O POLYPS  POST OP - COLON POLYPS,, DIVERTICULOSIS, HEMORRHOIDS    CORONARY ARTERY BYPASS GRAFT N/A 04/11/2017    Procedure: AR STERNOTOMY CORONARY ARTERY BYPASS GRAFT TIMES 3 USING LEFT INTERNAL MAMMARY ARTERY AND LEFT GREATER SAPHENOUS VEIN GRAFT PER ENDOSCOPIC VEIN HARVESTING AND PRP ;  Surgeon: Temo Cortez MD;  Location: [facility] MAIN OR;  Service:     ENDOSCOPY  01/06/2015    HH, Ervin's esophagus    ENDOSCOPY N/A 02/14/2019    Z line irregular, HH, Ervin's esophagus    ENDOSCOPY N/A 11/05/2021    Procedure: ESOPHAGOGASTRODUODENOSCOPY WITH BIOPSIES;  Surgeon: Everton Abel MD;  Location: [facility] ENDOSCOPY;  Service: Gastroenterology;  Laterality: N/A;  PRE OP - PERS H/O ERVIN'S  POST OP - IRREG Z LINE    ENDOSCOPY  W/ PEG TUBE PLACEMENT N/A 11/6/2023    Procedure: ESOPHAGOGASTRODUODENOSCOPY WITH PERCUTANEOUS ENDOSCOPIC GASTROSTOMY TUBE INSERTION;  Surgeon: Temo Ramsey MD;  Location: [facility] ENDOSCOPY;  Service: General;  Laterality: N/A;  Pre: dysphagia  Post: same    KNEE SURGERY Left     URETEROSCOPY LASER LITHOTRIPSY WITH STENT INSERTION Left 8/23/2022    Procedure: Cysto retrograde with left uretro stent placement;  Surgeon: Damien Oliveira MD;  Location: [facility] MAIN OR;  Service: Urology;  Laterality: Left;    VASECTOMY          Current Outpatient Medications on File Prior to Visit   Medication Sig Dispense Refill    allopurinol (ZYLOPRIM) 300 MG tablet Take 1 tablet by mouth Daily. Indications: Disorder of Excessive Uric Acid in the Blood 90 tablet 1    amitriptyline (ELAVIL) 50 MG tablet TAKE ONE TABLET BY MOUTH ONCE NIGHTLY 30 tablet 1    apixaban (ELIQUIS) 2.5 MG tablet tablet Take 1 tablet by mouth Every 12 (Twelve) Hours. Indications: Atrial Fibrillation      carbidopa-levodopa (Sinemet)  MG per tablet Take 1 tablet by mouth 3 (Three) Times a Day. Take one tablet in the AM and one in the evening for one week, then increase to one tablet in the AM, one tablet in the afternoon and one tablet around bedtime. 90 tablet 2    HYDROcodone-acetaminophen (NORCO) 5-325 MG per tablet Take 1 tablet by mouth 3 (Three) Times a Day As Needed for Severe Pain or Moderate Pain. 30 day supply. DNF 7/28/24 90 tablet 0    lisinopril (PRINIVIL,ZESTRIL) 5 MG tablet Take 1 tablet by mouth Daily. 30 tablet 11    RABEprazole (ACIPHEX) 20 MG EC tablet TAKE 1 TABLET BY MOUTH DAILY 90 tablet 0    rosuvastatin (Crestor) 20 MG tablet Administer 1 tablet per G tube Every Night.      chlorproMAZINE (THORAZINE) 25 MG tablet Take 1 tablet by mouth 3 (Three) Times a Day. (Patient not taking: Reported on 6/26/2024) 30 tablet 1    folic acid (FOLVITE) 1 MG tablet Take 1 tablet by mouth Daily. Indications: Anemia From Inadequate  Folic Acid (Patient not taking: Reported on 6/26/2024)      ipratropium-albuterol (DUO-NEB) 0.5-2.5 mg/3 ml nebulizer Take 3 mL by nebulization Every 4 (Four) Hours As Needed for Shortness of Air. (Patient not taking: Reported on 6/26/2024)      Nutritional Supplements (OSMOLITE 1.5 GIANNI PO) 240 Units/oz/day by Gastrostomy Tube route 6 (Six) Times a Day. Indications: nutritional support (Patient not taking: Reported on 6/26/2024)      ondansetron (ZOFRAN) 4 MG tablet Take 1 tablet by mouth Every 8 (Eight) Hours As Needed. Indications: Nausea and Vomiting (Patient not taking: Reported on 6/26/2024)      sucralfate (Carafate) 1 g tablet Take 1 tablet by mouth Daily. Patient only takes 1 time a day  Indications: Gastroesophageal Reflux Disease (Patient not taking: Reported on 6/26/2024)       Current Facility-Administered Medications on File Prior to Visit   Medication Dose Route Frequency Provider Last Rate Last Admin    cyanocobalamin injection 1,000 mcg  1,000 mcg Intramuscular Q30 Days Damian Sanchez MD   1,000 mcg at 04/06/23 0957        ALLERGIES:    Allergies   Allergen Reactions    Atorvastatin Myalgia     Myalgia    Penicillins Hives, Swelling and Rash     Tolerates cephalosporins         Social History     Socioeconomic History    Marital status:      Spouse name: Brooke    Years of education: 9th grade   Tobacco Use    Smoking status: Former     Current packs/day: 0.00     Average packs/day: 1 pack/day for 50.0 years (50.0 ttl pk-yrs)     Types: Cigarettes     Start date: 1/1/1959     Quit date: 1/1/2009     Years since quitting: 15.5     Passive exposure: Past    Smokeless tobacco: Never    Tobacco comments:     CAFFEINE USE   Vaping Use    Vaping status: Never Used   Substance and Sexual Activity    Alcohol use: Not Currently     Comment: rarely    Drug use: No    Sexual activity: Defer     Partners: Female        Family History   Problem Relation Age of Onset    Hypertension Mother     Heart  "disease Mother     Heart attack Mother     Stroke Mother     Heart disease Father     Heart attack Father     Stroke Father     Hypertension Sister     Heart attack Brother     Heart disease Brother     No Known Problems Brother     Heart disease Brother     Heart attack Brother     Diabetes Brother     No Known Problems Maternal Grandmother     No Known Problems Maternal Grandfather     No Known Problems Paternal Grandmother     No Known Problems Paternal Grandfather     Pancreatic cancer Paternal Cousin     Arthritis Other     Diabetes Other     Hypertension Other     Kidney disease Other         stones    Malig Hyperthermia Neg Hx         Review of Systems   As per HPI    Objective     Vitals:    07/24/24 1558   BP: 132/64   Pulse: 81   Resp: 16   Temp: 97.7 °F (36.5 °C)   TempSrc: Oral   SpO2: 98%   Weight: 71.2 kg (156 lb 14.4 oz)   Height: 172.7 cm (67.99\")   PainSc: 0-No pain             7/24/2024     3:48 PM   Current Status   ECOG score 2     Physical Exam  Vitals reviewed.   Constitutional:       General: He is not in acute distress.     Appearance: Normal appearance. He is well-developed.   HENT:      Head: Normocephalic and atraumatic.   Eyes:      Pupils: Pupils are equal, round, and reactive to light.   Cardiovascular:      Rate and Rhythm: Normal rate and regular rhythm.      Heart sounds: Normal heart sounds. No murmur heard.  Pulmonary:      Effort: Pulmonary effort is normal. No respiratory distress.      Breath sounds: Normal breath sounds.   Abdominal:      Palpations: Abdomen is soft.   Musculoskeletal:         General: Normal range of motion.      Cervical back: Normal range of motion.   Skin:     General: Skin is warm and dry.      Findings: No rash.   Neurological:      Mental Status: He is alert and oriented to person, place, and time.       I have reexamined the patient and the results are consistent with the previously documented exam. Aquiles Lopez MD       RECENT LABS:    Assessment " plan:  *Hospitalized in mid October to early November with COVID-pneumonia with profound deconditioning discharged to nursing home  Readmitted 11/22/2023 with anemia-patient has not been on hydroxyurea at discharge from the hospital and remains on Eliquis  MCV is dropped significantly suggesting iron depletion and blood loss (but also may be related to being off Hydrea)  Patient himself denies hematochezia or melena  B12 folate normal ferritin 566 iron saturation 12% reticulocyte count 9% rule out hemolysis although acute blood loss can also cause an elevated reticulocyte  LDH elevated 356 haptoglobin low at 24-suggest hemolysis suggest hemolysis   Crossmatch compatible suggesting indirect claudia neg  Direct claudia Complement +?  Cold agglutinin versus PNH-PNH profile and cold agglutinin titer pending  Hemoglobin stable 11/27/2023-9.2  CT abdomen/pelvis 11/27/2023-suggestive of lower lobe pneumonia, stable pancreatic lesions probable IPMN, no evidence of bleeding  Discharged from rehab on Friday and readmitted with fall and difficulty walking 4 days later-CBC stable  Cold agglutinins 1:32 PNH negative  Patient seen 4/12/24 stable hematologically, Hydrea not reinstituted  Reassessment 7/24/2024 again stable hematologically.     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  These indices have been slowly reducing over the last 2 years approximately followed more closely.  Concurrently is a mild drop in his baseline hemoglobin still well within normal limits.  thrombocytosis thought, initially, to be related to iron deficiency.  Ferritin found to be 16.3 and iron of 26 with 5% saturation and TIBC 511.   The patient was placed on ferrous gluconate which,  unfortunately, he was unable to tolerate with worsening constipation.  He had further issues in early September with left renal stone, requiring cystoscopy, stent placement 8/23/2022.  9/28/2022 with repeat studies performed 9/21 demonstrating 5% iron saturation, ferritin of 12.5.  He remains further weight and fatigue, again oral iron intolerant and will require IV iron preparations for his iron deficiency anemia.  We discontinued oral iron and proceeded with Injectafer given 9/28/2022 in 10/5/2022  4/17/2023JAK  2-V617 F mutation having been detected.    In the interval he was admitted 2/19-21/23 for weakness, fall and ER evaluation not demonstrate any acute intracranial process, CT of lumbar spine with lumbar degenerative disease and other studies not show any acute abnormalities.  Fortunately he went on to improve though his wife had a positive COVID test recently on home examination.  4/10/2023 include an H&H of 18.3 and 60.9 white count of 14,700 platelet count of 477,000.  5/15/2023 hemoglobin 15.3, hematocrit 50.4.  Reviewed with Dr. Lopez plans to hold off on phlebotomy and initiate Hydrea 1000 mg daily.  We will tentatively schedule him for return follow-up visit in 2 weeks with repeat labs and reassessment.  6/1/2020 2:23 weeks of Hydrea 1000 mg daily, WBC 5.0, hemoglobin 15.2, hematocrit 49.8, platelets 113,000.  Hydrea was reduced to 500 mg daily  Stability of counts today, 6/15/2023 on 500 mg daily with WBC 4.81, hemoglobin 14.6, hematocrit 46.7%, platelets 156,000  Patient seen 6/30/2023 with H&H of 14.1 and 46.6 white count of 4800 and platelet count of 1 46,000.  Patient subsequently assessed including 9/21/2023 with H&H gradually dropping 11.1 and 32.9, white count of 5110, platelet count 151,000, .8.  10/20/2023 WBC 8.04, SNC 5.42, Hgb 13.1, Platelets 247,000.  Continue Hydrea 500 mg every other day.  11/9/2023-hospitalized with COVID-pneumonia and severe deconditioningdischarged to  nursing home on 11/9/2023 off Hydrea but on Eliquis  Evidence of hemolysis in 12/23 with negative PNH workup and very low titer cold agglutinins  CBC stable - hold hydrea  Assessed 4/12/2024-stable hematologically, Hydrea held  This assessment for assessment for wearing despite leukocytosis and thrombocytosis his anemia is not worsening and intervention with treatment such as Hydrea phlebotomy is not necessary as of yet.     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  Patient encouraged to proceed with lymphedema clinic and physical therapy is available  4.  3-month CBC, 6 months NP, CBC

## 2024-07-24 ENCOUNTER — LAB (OUTPATIENT)
Dept: LAB | Facility: HOSPITAL | Age: 79
End: 2024-07-24
Payer: MEDICARE

## 2024-07-24 ENCOUNTER — OFFICE VISIT (OUTPATIENT)
Dept: ONCOLOGY | Facility: CLINIC | Age: 79
End: 2024-07-24
Payer: MEDICARE

## 2024-07-24 VITALS
WEIGHT: 156.9 LBS | TEMPERATURE: 97.7 F | SYSTOLIC BLOOD PRESSURE: 132 MMHG | HEART RATE: 81 BPM | OXYGEN SATURATION: 98 % | HEIGHT: 68 IN | RESPIRATION RATE: 16 BRPM | DIASTOLIC BLOOD PRESSURE: 64 MMHG | BODY MASS INDEX: 23.78 KG/M2

## 2024-07-24 DIAGNOSIS — D45 POLYCYTHEMIA VERA: ICD-10-CM

## 2024-07-24 DIAGNOSIS — D45 POLYCYTHEMIA VERA: Primary | ICD-10-CM

## 2024-07-24 LAB
BASOPHILS # BLD AUTO: 0.12 10*3/MM3 (ref 0–0.2)
BASOPHILS NFR BLD AUTO: 0.5 % (ref 0–1.5)
DEPRECATED RDW RBC AUTO: 52.6 FL (ref 37–54)
EOSINOPHIL # BLD AUTO: 0.69 10*3/MM3 (ref 0–0.4)
EOSINOPHIL NFR BLD AUTO: 2.6 % (ref 0.3–6.2)
ERYTHROCYTE [DISTWIDTH] IN BLOOD BY AUTOMATED COUNT: 21.4 % (ref 12.3–15.4)
HCT VFR BLD AUTO: 46 % (ref 37.5–51)
HGB BLD-MCNC: 13.1 G/DL (ref 13–17.7)
IMM GRANULOCYTES # BLD AUTO: 0.68 10*3/MM3 (ref 0–0.05)
IMM GRANULOCYTES NFR BLD AUTO: 2.6 % (ref 0–0.5)
LYMPHOCYTES # BLD AUTO: 1.77 10*3/MM3 (ref 0.7–3.1)
LYMPHOCYTES NFR BLD AUTO: 6.8 % (ref 19.6–45.3)
MCH RBC QN AUTO: 20.8 PG (ref 26.6–33)
MCHC RBC AUTO-ENTMCNC: 28.5 G/DL (ref 31.5–35.7)
MCV RBC AUTO: 73 FL (ref 79–97)
MONOCYTES # BLD AUTO: 2.23 10*3/MM3 (ref 0.1–0.9)
MONOCYTES NFR BLD AUTO: 8.5 % (ref 5–12)
NEUTROPHILS NFR BLD AUTO: 20.7 10*3/MM3 (ref 1.7–7)
NEUTROPHILS NFR BLD AUTO: 79 % (ref 42.7–76)
NRBC BLD AUTO-RTO: 0 /100 WBC (ref 0–0.2)
PLATELET # BLD AUTO: 666 10*3/MM3 (ref 140–450)
PMV BLD AUTO: 10 FL (ref 6–12)
RBC # BLD AUTO: 6.3 10*6/MM3 (ref 4.14–5.8)
WBC NRBC COR # BLD AUTO: 26.19 10*3/MM3 (ref 3.4–10.8)

## 2024-07-24 PROCEDURE — 3078F DIAST BP <80 MM HG: CPT | Performed by: INTERNAL MEDICINE

## 2024-07-24 PROCEDURE — 85025 COMPLETE CBC W/AUTO DIFF WBC: CPT

## 2024-07-24 PROCEDURE — 3075F SYST BP GE 130 - 139MM HG: CPT | Performed by: INTERNAL MEDICINE

## 2024-07-24 PROCEDURE — 1126F AMNT PAIN NOTED NONE PRSNT: CPT | Performed by: INTERNAL MEDICINE

## 2024-07-24 PROCEDURE — 99213 OFFICE O/P EST LOW 20 MIN: CPT | Performed by: INTERNAL MEDICINE

## 2024-07-24 PROCEDURE — 36415 COLL VENOUS BLD VENIPUNCTURE: CPT

## 2024-07-25 ENCOUNTER — TREATMENT (OUTPATIENT)
Dept: PHYSICAL THERAPY | Facility: CLINIC | Age: 79
End: 2024-07-25
Payer: MEDICARE

## 2024-07-25 DIAGNOSIS — R26.89 OTHER ABNORMALITIES OF GAIT AND MOBILITY: ICD-10-CM

## 2024-07-25 DIAGNOSIS — R29.898 DECREASED STRENGTH OF LOWER EXTREMITY: Primary | ICD-10-CM

## 2024-07-25 NOTE — LETTER
Good Afternoon Dr. Sanchez,  I am seeing Madhu for endurance and LE deconditioning. Today it was brought up that he has had the feeding tube since his hospitalization and has not had any further instructions since DC which was about 9 months ago. He said he has been getting nutrients orally too but has continued with a protein supplement 2x a day. This morning when they opened the cap he said a liquid water-like substance sprayed out and that this has never happened before. We discussed that I would get in touch with you to see what further steps if any are needed.     Thank you,  Mylene Hernandez, PT  Physical Therapist  [place] [facility] license #: [id number]

## 2024-07-25 NOTE — PROGRESS NOTES
Physical Therapy Daily Treatment Note  [facility] [place]  [address], [address]  [place] [id number]     Patient: Madhu Santoro  : 1945  Referring practitioner: Damian Sanchez MD  Today's Date: 2024    VISIT#: 16    Visit Diagnoses:    ICD-10-CM ICD-9-CM   1. Decreased strength of lower extremity  R29.898 729.89   2. Other abnormalities of gait and mobility  R26.89 781.2       Subjective   Madhu Santoro reports: Patient reports that he continues to feel weaker and weaker. He has no energy and feels like he has a lot of balance issues. He reports that his feeding tube was spraying some water this morning.       Objective       See Exercise, Manual, and Modality Logs for complete treatment.     Patient Education: HEP review  Exercise rationale/ pain free exercise performance  Alternate exercise positions  Verbal/Tactile cues to ensure correct exercise performance/technique       Assessment/Plan  Patient demonstrates good tolerance to therapeutic exercises and activities on this date with report of general fatigue and LE fatigue especially in his quads throughout and at the end of the session. Continued with standing BIG activities. Discussed getting in contact with his PCP on Monday/ Tuesday if he hadn't heard from them in regards to his feeding tube. Will continue to focus on general strength and endurance with an emphasis on bigger movements.      Progress per Plan of Care and Progress strengthening /stabilization /functional activity          Timed:         Manual Therapy:    -     mins  12907;     Therapeutic Exercise:    -     mins  77366;     Neuromuscular Ankush:    10    mins  24194;    Therapeutic Activity:     17     mins  90491;     Gait Training:           mins  84941;     Ultrasound:          mins  32033;    Ionto:                                   mins  08299  Self Care:                       8     mins  58317    Un-Timed:  Electrical Stimulation:          mins  69323 ( );  Dry Needling          mins self-pay  Traction          mins [id number]  Re-Eval                               mins  26734  Group Therapy           ___ mins [id number]    Timed Treatment:   35   mins   Total Treatment:     59   mins    Mylene Hernandez PT  Physical Therapist  [place] [facility] license #: [id number]

## 2024-07-26 DIAGNOSIS — Z97.8 USES FEEDING TUBE: Primary | ICD-10-CM

## 2024-07-30 ENCOUNTER — TREATMENT (OUTPATIENT)
Dept: PHYSICAL THERAPY | Facility: CLINIC | Age: 79
End: 2024-07-30
Payer: MEDICARE

## 2024-07-30 DIAGNOSIS — R29.898 DECREASED STRENGTH OF LOWER EXTREMITY: Primary | ICD-10-CM

## 2024-07-30 DIAGNOSIS — R26.89 OTHER ABNORMALITIES OF GAIT AND MOBILITY: ICD-10-CM

## 2024-07-30 PROCEDURE — 97530 THERAPEUTIC ACTIVITIES: CPT

## 2024-07-30 PROCEDURE — 97110 THERAPEUTIC EXERCISES: CPT

## 2024-07-30 PROCEDURE — 97112 NEUROMUSCULAR REEDUCATION: CPT

## 2024-07-30 NOTE — PROGRESS NOTES
Physical Therapy Daily Treatment Note  [facility] [place]  [address], [address]  [place] [id number]     Patient: Madhu Santoro  : 1945  Referring practitioner: Damian Sanchez MD  Today's Date: 2024    VISIT#: 17    Visit Diagnoses:    ICD-10-CM ICD-9-CM   1. Decreased strength of lower extremity  R29.898 729.89   2. Other abnormalities of gait and mobility  R26.89 781.2       Subjective   Madhu Santoro reports: Patient reports that he continues to feel week. He had some yellow discharge from his feeding tube this morning.       Objective       See Exercise, Manual, and Modality Logs for complete treatment.     Patient Education: HEP review  Exercise rationale/ pain free exercise performance  Alternate exercise positions  Verbal/Tactile cues to ensure correct exercise performance/technique       Assessment/Plan  Patient demonstrates good tolerance to therapeutic exercises and neuro re-ed tasks with report of general fatigue throughout. Added more standing exercises and ambulated with cane with verbal cues for proper coordination and increasing his stride. Discussed signs of infection to watch for including tenderness around insertion of the feeding tube, redness around site, or red/ pink DC. He sees Dr. Sanchez on Friday. Will continue to progress LE strengthening and balance as tolerated.       Progress per Plan of Care and Progress strengthening /stabilization /functional activity          Timed:         Manual Therapy:    -     mins  83067;     Therapeutic Exercise:    10     mins  68717;     Neuromuscular Ankush:    15    mins  17705;    Therapeutic Activity:     25     mins  97823;     Gait Training:           mins  23055;     Ultrasound:          mins  73264;    Ionto:                                   mins  68135  Self Care:                       5     mins  06946    Un-Timed:  Electrical Stimulation:         mins  36786 ( );  Dry Needling           mins self-pay  Traction          mins 14427  Re-Eval                               mins  52525  Group Therapy           ___ mins 11793    Timed Treatment:   55   mins   Total Treatment:     58   mins    Mylene Hernandez PT  Physical Therapist  [place] license #: [id number]

## 2024-08-01 ENCOUNTER — TREATMENT (OUTPATIENT)
Dept: PHYSICAL THERAPY | Facility: CLINIC | Age: 79
End: 2024-08-01
Payer: MEDICARE

## 2024-08-01 DIAGNOSIS — R29.898 DECREASED STRENGTH OF LOWER EXTREMITY: Primary | ICD-10-CM

## 2024-08-01 DIAGNOSIS — R26.89 OTHER ABNORMALITIES OF GAIT AND MOBILITY: ICD-10-CM

## 2024-08-01 NOTE — PROGRESS NOTES
Physical Therapy Daily Treatment Note  [facility] [place]  [address], [address]  [place] [address]     Patient: Madhu Santoro  : 1945  Referring practitioner: Damian Sanchez MD  Today's Date: 2024    VISIT#: 18    Visit Diagnoses:    ICD-10-CM ICD-9-CM   1. Decreased strength of lower extremity  R29.898 729.89   2. Other abnormalities of gait and mobility  R26.89 781.2       Subjective   Madhu Santoro reports: Patient reports that he is getting really frustrated and always feels week. He reports that he has stopped taking his medication that the neurosurgeon prescribed him.       Objective       See Exercise, Manual, and Modality Logs for complete treatment.     Patient Education: HEP review  Exercise rationale/ pain free exercise performance  Alternate exercise positions  Verbal/Tactile cues to ensure correct exercise performance/technique       Assessment/Plan  Patient demonstrates good tolerance to therapeutic exercises and activities on this date with general fatigue reported throughout. Discussed the importance of contacting his neurologist before making any decisions on whether to stop taking the medication that he prescribed. Discussed how that is not in my scope of practice but that he should definitely not change anything until talking to the provider. Patient was able to complete first 5 STS of each set with one UE assist. Continued with big movements and opening up his thoracic spine and UE to avoid forward posture. Will continue to progress standing activities and functional activities as tolerated.       Progress per Plan of Care and Progress strengthening /stabilization /functional activity          Timed:         Manual Therapy:    -     mins  25813;     Therapeutic Exercise:    10     mins  33725;     Neuromuscular Ankush:    15    mins  83876;    Therapeutic Activity:     20     mins  89979;     Gait Training:           mins  35403;      Ultrasound:          mins  11146;    Ionto:                                   mins  23576  Self Care:                       -     mins  79618    Un-Timed:  Electrical Stimulation:         mins  38757 ( );  Dry Needling          mins self-pay  Traction          mins 12342  Re-Eval                               mins  34833  Group Therapy           ___ mins 97205    Timed Treatment:   45   mins   Total Treatment:     58   mins    Mylene Hernandez PT  Physical Therapist  [place] license #: [id number]

## 2024-08-06 ENCOUNTER — TREATMENT (OUTPATIENT)
Dept: PHYSICAL THERAPY | Facility: CLINIC | Age: 79
End: 2024-08-06
Payer: MEDICARE

## 2024-08-06 DIAGNOSIS — R29.898 DECREASED STRENGTH OF LOWER EXTREMITY: Primary | ICD-10-CM

## 2024-08-06 DIAGNOSIS — R26.89 OTHER ABNORMALITIES OF GAIT AND MOBILITY: ICD-10-CM

## 2024-08-08 ENCOUNTER — TELEPHONE (OUTPATIENT)
Dept: FAMILY MEDICINE CLINIC | Facility: CLINIC | Age: 79
End: 2024-08-08
Payer: MEDICARE

## 2024-08-08 ENCOUNTER — OFFICE VISIT (OUTPATIENT)
Dept: CARDIOLOGY | Facility: CLINIC | Age: 79
End: 2024-08-08
Payer: MEDICARE

## 2024-08-08 VITALS
HEART RATE: 80 BPM | SYSTOLIC BLOOD PRESSURE: 126 MMHG | OXYGEN SATURATION: 98 % | DIASTOLIC BLOOD PRESSURE: 68 MMHG | BODY MASS INDEX: 23.19 KG/M2 | HEIGHT: 68 IN | WEIGHT: 153 LBS

## 2024-08-08 DIAGNOSIS — Z95.1 S/P CABG (CORONARY ARTERY BYPASS GRAFT): Primary | ICD-10-CM

## 2024-08-08 DIAGNOSIS — E87.5 HYPERKALEMIA: Primary | ICD-10-CM

## 2024-08-08 NOTE — TELEPHONE ENCOUNTER
Please call patient and let him know that further review of his chart does show a slight elevation in his potassium at last drawl, so I ordered a repeat nonfasting BMP for him to run appear either today or tomorrow to repeat.

## 2024-08-08 NOTE — PROGRESS NOTES
"      CARDIOLOGY    Massimo Jordan MD    ENCOUNTER DATE:  08/08/2024    Madhu Santoro / 78 y.o. / male        CHIEF COMPLAINT / REASON FOR OFFICE VISIT     S/P CABG (coronary artery bypass graft) (05/16/2024 Follow up)      HISTORY OF PRESENT ILLNESS       HPI  Madhu Santoro is a 78 y.o. male who presents today for follow-up.  Patient is not sure why he was here and I cannot find an issue.  He is about the same as had no lower extremity edema although he feels about the same.  He has Parkinson's very difficult for him to get in the office.      The following portions of the patient's history were reviewed and updated as appropriate: allergies, current medications, past family history, past medical history, past social history, past surgical history and problem list.      VITAL SIGNS     Visit Vitals  /68 (BP Location: Left arm)   Pulse 80   Ht 172.7 cm (68\")   Wt 69.4 kg (153 lb)   SpO2 98%   BMI 23.26 kg/m²         Wt Readings from Last 3 Encounters:   08/08/24 69.4 kg (153 lb)   07/24/24 71.2 kg (156 lb 14.4 oz)   06/26/24 69.9 kg (154 lb)     Body mass index is 23.26 kg/m².      REVIEW OF SYSTEMS   ROS        PHYSICAL EXAMINATION     Vitals reviewed.   Constitutional:       Appearance: Healthy appearance.   Pulmonary:      Effort: Pulmonary effort is normal.   Cardiovascular:      Normal rate. Regular rhythm. Normal S1. Normal S2.       Murmurs: There is no murmur.      No gallop.  No click. No rub.   Pulses:     Intact distal pulses.   Edema:     Peripheral edema absent.   Neurological:      Mental Status: Alert.           REVIEWED DATA     Procedures    Cardiac Procedures:            ASSESSMENT & PLAN      Diagnosis Plan   1. S/P CABG (coronary artery bypass graft)              SUMMARY/DISCUSSION  Coronary artery disease.  Patient is doing well no issues.  History of flutter no issues  Patient's potassium level is noted to be elevated.  Dr. Sanchez is going to address this.  Follow-up 1 year or " sooner if issues should occur.        MEDICATIONS         Discharge Medications            Accurate as of August 8, 2024  2:37 PM. If you have any questions, ask your nurse or doctor.                Continue These Medications        Instructions Start Date   allopurinol 300 MG tablet  Commonly known as: ZYLOPRIM   300 mg, Oral, Daily      amitriptyline 50 MG tablet  Commonly known as: ELAVIL   50 mg, Oral, Nightly      apixaban 2.5 MG tablet tablet  Commonly known as: ELIQUIS   2.5 mg, Oral, Every 12 Hours Scheduled      carbidopa-levodopa  MG per tablet  Commonly known as: Sinemet   1 tablet, Oral, 3 Times Daily, Take one tablet in the AM and one in the evening for one week, then increase to one tablet in the AM, one tablet in the afternoon and one tablet around bedtime.      HYDROcodone-acetaminophen 5-325 MG per tablet  Commonly known as: NORCO   1 tablet, Oral, 3 Times Daily PRN, 30 day supply. DNF 7/28/24      lisinopril 5 MG tablet  Commonly known as: PRINIVIL,ZESTRIL   5 mg, Oral, Daily      OSMOLITE 1.5 GIANNI PO   240 Units/oz/day, Gastrostomy Tube, 6 Times Daily      RABEprazole 20 MG EC tablet  Commonly known as: ACIPHEX   20 mg, Oral, Daily      rosuvastatin 20 MG tablet  Commonly known as: Crestor   20 mg, Per G Tube, Nightly                   **Dragon Disclaimer:   Much of this encounter note is an electronic transcription/translation of spoken language to printed text. The electronic translation of spoken language may permit erroneous, or at times, nonsensical words or phrases to be inadvertently transcribed. Although I have reviewed the note for such errors, some may still exist.

## 2024-08-08 NOTE — TELEPHONE ENCOUNTER
PT HAS A SCHEDULED LAB APPOINTMENT 8/9/2024 NO LAB ORDERS ARE IN THE SYSTEM  PER DR CHAPPELL  -Daly, I have plenty of labs that have already been done to work wit

## 2024-08-08 NOTE — PROGRESS NOTES
Physical Therapy Daily Treatment Note    [facility] [place]  [address], [address]  [place] [address]    Visit # 19        Patient: Madhu Santoro   : 1945  Referring practitioner: Damian Sanchez MD  Date of Initial Evaluation:  Type: THERAPY  Noted: 2024  Today's Date: 2024           ICD-10-CM ICD-9-CM   1. Decreased strength of lower extremity  R29.898 729.89   2. Other abnormalities of gait and mobility  R26.89 781.2       Subjective  Madhu Santoro reports:   I just feel weak, I have been trying to walk to the end of my driveway and back, I have to stop at the end and rest for a  couple minutes.      Objective   See Exercise, Manual, and Modality Logs for complete treatment   Note: any exercises/interventions not performed today have been marked as deferred on flowsheets.     Pt Education:  HEP review  Exercise rationale/ pain free exercise performance  Posture/Postural awareness  Alternate exercise positions  Verbal/Tactile cues to ensure correct exercise performance/technique    Assessment/Plan  Tolerated continued progression of therapeutic exercise/therapeutic activity/neuromuscular re-ed with focus on larger movements and balance.  Pt required CGA/Min A with gait belt support during larger movement patterns.     Would continue to benefit from skilled PT progressing with functional WB and strength.     Progress per Plan of Care      Timed:         Manual Therapy:         mins  72876     Therapeutic Exercise:         mins  90705     Neuromuscular Ankush:    20    mins  42148    Therapeutic Activity:      10    mins  00960     Gait Training:           mins  45975     Ultrasound:          mins  79722    Ionto                                   mins  39944  Self Care                            mins  80706    Un-Timed:  Electrical Stimulation:         mins 89180 ( )  Traction          mins 12530    Timed Treatment:   30   mins   Total Treatment:      40   mins    Shane Rivero, PTA  [place] License #[id number]  Physical Therapist Assistant

## 2024-08-08 NOTE — TELEPHONE ENCOUNTER
Hub staff attempted to follow warm transfer process and was unsuccessful     Caller: Brooke Santoro ([facility])    Relationship to patient: Emergency Contact    Best call back number: [phone]     Patient is needing: PATIENT IS NEEDING A LAB APPOINTMENT

## 2024-08-09 ENCOUNTER — TREATMENT (OUTPATIENT)
Dept: PHYSICAL THERAPY | Facility: CLINIC | Age: 79
End: 2024-08-09
Payer: MEDICARE

## 2024-08-09 DIAGNOSIS — R29.898 DECREASED STRENGTH OF LOWER EXTREMITY: Primary | ICD-10-CM

## 2024-08-09 DIAGNOSIS — R26.89 OTHER ABNORMALITIES OF GAIT AND MOBILITY: ICD-10-CM

## 2024-08-09 NOTE — PROGRESS NOTES
Physical Therapy Daily Treatment Note  [facility] [place]  [address], [address]  [place] [address]     Patient: Madhu Santoro  : 1945  Referring practitioner: Damian Sanchez MD  Today's Date: 2024    VISIT#: 20    Visit Diagnoses:    ICD-10-CM ICD-9-CM   1. Decreased strength of lower extremity  R29.898 729.89   2. Other abnormalities of gait and mobility  R26.89 781.2       Subjective   Madhu Santoro reports: Patient reports that he has been trying to walk more and he might need to start using the Rolator walker in case if he needs to sit with walking.       Objective       See Exercise, Manual, and Modality Logs for complete treatment.     Patient Education: HEP review  Exercise rationale/ pain free exercise performance  Alternate exercise positions  Verbal/Tactile cues to ensure correct exercise performance/technique       Assessment/Plan  Patient demonstrates good tolerance to therapeutic exercises and activities on this date with report of general fatigue and LE fatigue especially in his quads throughout and at the end of the session. Continued with standing BIG activities. Discussed safety with exercises at home and not trying the zero turn at this time. Will continue to focus on general strength and endurance with an emphasis on bigger movements.         Progress per Plan of Care and Progress strengthening /stabilization /functional activity          Timed:         Manual Therapy:    -     mins  16846;     Therapeutic Exercise:    8     mins  70161;     Neuromuscular Ankush:    10    mins  64415;    Therapeutic Activity:     12     mins  04086;     Gait Training:           mins  21400;     Ultrasound:          mins  71285;    Ionto:                                   mins  40247  Self Care:                       -     mins  80538    Un-Timed:  Electrical Stimulation:         mins  74152 ( );  Dry Needling          mins self-pay  Traction           mins 32863  Re-Eval                               mins  50388  Group Therapy           ___ mins 81609    Timed Treatment:   30   mins   Total Treatment:     58   mins    Mylene Hernandez PT  Physical Therapist  [place] license #: [id number]

## 2024-08-14 ENCOUNTER — TREATMENT (OUTPATIENT)
Dept: PHYSICAL THERAPY | Facility: CLINIC | Age: 79
End: 2024-08-14
Payer: MEDICARE

## 2024-08-14 DIAGNOSIS — R29.898 DECREASED STRENGTH OF LOWER EXTREMITY: Primary | ICD-10-CM

## 2024-08-14 DIAGNOSIS — R26.89 OTHER ABNORMALITIES OF GAIT AND MOBILITY: ICD-10-CM

## 2024-08-14 PROCEDURE — 97112 NEUROMUSCULAR REEDUCATION: CPT

## 2024-08-14 PROCEDURE — 97530 THERAPEUTIC ACTIVITIES: CPT

## 2024-08-14 NOTE — PROGRESS NOTES
Physical Therapy Daily Treatment Note  [facility] [place]  [address], [address]  [place] [id number]     Patient: Madhu Santoro  : 1945  Referring practitioner: Damian Sanchez MD  Today's Date: 2024    VISIT#: 21    Visit Diagnoses:    ICD-10-CM ICD-9-CM   1. Decreased strength of lower extremity  R29.898 729.89   2. Other abnormalities of gait and mobility  R26.89 781.2         Subjective   Madhu Santoro reports:   I don't feel much improvement from the exercises over the past few weeks.  I feel stiff as a board today.     Objective   See Exercise, Manual, and Modality Logs for complete treatment.     Patient Education:   HEP review  Exercise rationale/ pain free exercise performance  Alternate exercise positions  Verbal/Tactile cues to ensure correct exercise performance/technique     Assessment/Plan  Patient demonstrates good tolerance to therapeutic exercises and activities on this date with report of leg tremors/fatigue/shakiness by end of session.  We continued with standing BIG activities, pt movement patterns appeared improved with repetition.  We discussed continuing PT through the end of this month and then pt trying HEP on his own through September until neuro appt at end of Sept.        Progress per Plan of Care and Progress strengthening /stabilization /functional activity          Timed:         Manual Therapy:    -     mins  37025;     Therapeutic Exercise:         mins  64596;     Neuromuscular Ankush:    20    mins  05920;    Therapeutic Activity:     10     mins  31584;     Gait Training:           mins  33859;     Ultrasound:          mins  09870;    Ionto:                                   mins  70270  Self Care:                       -     mins  64340    Un-Timed:  Electrical Stimulation:         mins  36868 ( );  Dry Needling          mins self-pay  Traction          mins 61471  Re-Eval                               mins   69487  Group Therapy           ___ mins 06534    Timed Treatment:   30   mins   Total Treatment:     52   mins    FREDY Lemus License #[id number]  Physical Therapist Assistant

## 2024-08-16 ENCOUNTER — TREATMENT (OUTPATIENT)
Dept: PHYSICAL THERAPY | Facility: CLINIC | Age: 79
End: 2024-08-16
Payer: MEDICARE

## 2024-08-16 DIAGNOSIS — R26.89 OTHER ABNORMALITIES OF GAIT AND MOBILITY: ICD-10-CM

## 2024-08-16 DIAGNOSIS — R29.898 DECREASED STRENGTH OF LOWER EXTREMITY: Primary | ICD-10-CM

## 2024-08-16 PROCEDURE — 97530 THERAPEUTIC ACTIVITIES: CPT

## 2024-08-16 PROCEDURE — 97112 NEUROMUSCULAR REEDUCATION: CPT

## 2024-08-16 NOTE — PROGRESS NOTES
Physical Therapy Daily Treatment Note  [facility] [place]  [address], [address]  [place] [id number]     Patient: Madhu Santoro  : 1945  Referring practitioner: Damian Sanchez MD  Today's Date: 2024    VISIT#: 22    Visit Diagnoses:    ICD-10-CM ICD-9-CM   1. Decreased strength of lower extremity  R29.898 729.89   2. Other abnormalities of gait and mobility  R26.89 781.2       Subjective   Madhu Santoro reports: Patient reports that there is nothing new to report.      Objective       See Exercise, Manual, and Modality Logs for complete treatment.     Patient Education: HEP review  Exercise rationale/ pain free exercise performance  Alternate exercise positions  Verbal/Tactile cues to ensure correct exercise performance/technique       Assessment/Plan  Patient demonstrates good tolerance to therapeutic exercises and activities on this date with report of general fatigue and LE fatigue especially in his quads throughout and at the end of the session. Continued with standing BIG activities. Will continue to focus on general strength and endurance with an emphasis on bigger movements through the end of the month and then patient would like to continue on his own for a while.      Progress per Plan of Care and Progress strengthening /stabilization /functional activity          Timed:         Manual Therapy:    -     mins  43025;     Therapeutic Exercise:    10     mins  18086;     Neuromuscular Ankush:    15    mins  22612;    Therapeutic Activity:     10     mins  16395;     Gait Training:           mins  58999;     Ultrasound:          mins  04007;    Ionto:                                   mins  73823  Self Care:                       -     mins  93546    Un-Timed:  Electrical Stimulation:         mins  46399 ( );  Dry Needling          mins self-pay  Traction          mins 69971  Re-Eval                               mins  39655  Group Therapy            ___ mins 67929    Timed Treatment:   35   mins   Total Treatment:     48   mins    Mylene Hernandez PT  Physical Therapist  [place] license #: [id number]

## 2024-08-20 ENCOUNTER — TREATMENT (OUTPATIENT)
Dept: PHYSICAL THERAPY | Facility: CLINIC | Age: 79
End: 2024-08-20
Payer: MEDICARE

## 2024-08-20 DIAGNOSIS — R26.89 OTHER ABNORMALITIES OF GAIT AND MOBILITY: ICD-10-CM

## 2024-08-20 DIAGNOSIS — R29.898 DECREASED STRENGTH OF LOWER EXTREMITY: Primary | ICD-10-CM

## 2024-08-20 PROCEDURE — 97530 THERAPEUTIC ACTIVITIES: CPT

## 2024-08-20 PROCEDURE — 97112 NEUROMUSCULAR REEDUCATION: CPT

## 2024-08-20 PROCEDURE — 97110 THERAPEUTIC EXERCISES: CPT

## 2024-08-20 NOTE — PROGRESS NOTES
Physical Therapy Daily Treatment Note  [facility] [place]  [address], [address]  [place] [id number]     Patient: Madhu Santoro  : 1945  Referring practitioner: Damian Sanchez MD  Today's Date: 2024    VISIT#: 23    Visit Diagnoses:    ICD-10-CM ICD-9-CM   1. Decreased strength of lower extremity  R29.898 729.89   2. Other abnormalities of gait and mobility  R26.89 781.2         Subjective   Madhu Santoro reports:   No specific changes or c/o of note at onset of session.  Pt does report he has continued walking up/down his driveway 3x/day, using rollator.    Objective   See Exercise, Manual, and Modality Logs for complete treatment.     Patient Education:   HEP review - discussed progressing walking with rollator  Exercise rationale/ pain free exercise performance  Alternate exercise positions  Verbal/Tactile cues to ensure correct exercise performance/technique       Assessment/Plan  Patient demonstrates good tolerance to therapeutic exercises and activities on this date, improvement with forward/backward stepping exercise incorporating arm swing with progressively less assitance needed vs last observed by this PTA.  Continued with cueing BIG activities in both standing and sitting.   We also discussed progressing walking with rollator beyond the end of driveway 1x/day as pt is comfortable doing to continue working on general endurance.     Progress per Plan of Care and Progress strengthening /stabilization /functional activity          Timed:         Manual Therapy:    -     mins  71997;     Therapeutic Exercise:    10     mins  49831;     Neuromuscular Ankush:   30    mins  43704;    Therapeutic Activity:     15     mins  88404;     Gait Training:           mins  93776;     Ultrasound:          mins  89335;    Ionto:                                   mins  18244  Self Care:                       -     mins  94221    Un-Timed:  Electrical Stimulation:          mins  12429 ( );  Dry Needling          mins self-pay  Traction          mins 44206  Re-Eval                               mins  19770  Group Therapy           ___ mins 85336    Timed Treatment:   55   mins   Total Treatment:     65   mins    FREDY Lemus License #[id number]  Physical Therapist Assistant

## 2024-08-22 ENCOUNTER — TELEPHONE (OUTPATIENT)
Dept: FAMILY MEDICINE CLINIC | Facility: CLINIC | Age: 79
End: 2024-08-22
Payer: MEDICARE

## 2024-08-22 ENCOUNTER — TREATMENT (OUTPATIENT)
Dept: PHYSICAL THERAPY | Facility: CLINIC | Age: 79
End: 2024-08-22
Payer: MEDICARE

## 2024-08-22 DIAGNOSIS — R26.89 OTHER ABNORMALITIES OF GAIT AND MOBILITY: ICD-10-CM

## 2024-08-22 DIAGNOSIS — E78.5 HYPERLIPIDEMIA, UNSPECIFIED HYPERLIPIDEMIA TYPE: Chronic | ICD-10-CM

## 2024-08-22 DIAGNOSIS — R29.898 DECREASED STRENGTH OF LOWER EXTREMITY: Primary | ICD-10-CM

## 2024-08-22 PROCEDURE — 97112 NEUROMUSCULAR REEDUCATION: CPT

## 2024-08-22 PROCEDURE — 97530 THERAPEUTIC ACTIVITIES: CPT

## 2024-08-22 PROCEDURE — 97110 THERAPEUTIC EXERCISES: CPT

## 2024-08-22 NOTE — TELEPHONE ENCOUNTER
Caller: Brooke Santoro ([facility])    Relationship: Emergency Contact    Best call back number: [phone]     What medication are you requesting:      What are your current symptoms:      How long have you been experiencing symptoms:      Have you had these symptoms before:    [] Yes  [] No    Have you been treated for these symptoms before:   [] Yes  [] No    If a prescription is needed, what is your preferred pharmacy and phone number: [facility] [id number] - [place] [address] [facility] [address] - [phone]  - [phone]      Additional notes: PATIENT WIFE BROOKE CALLED AND WANTS TO KNOW IF DR CHAPPELL WILL SEND IN PRESCRIPTION FOR     rosuvastatin (Crestor) 20 MG tablet   DUE TO SHOWING UNDER ANOTHER PROVIDER THAT THE PATIENT DOES NOT SEE.

## 2024-08-22 NOTE — PROGRESS NOTES
Physical Therapy Daily Treatment Note  [facility] [place]  [address], [address]  [place] [address]     Patient: Madhu Santoro  : 1945  Referring practitioner: Damian Sanchez MD  Today's Date: 2024    VISIT#: 24    Visit Diagnoses:    ICD-10-CM ICD-9-CM   1. Decreased strength of lower extremity  R29.898 729.89   2. Other abnormalities of gait and mobility  R26.89 781.2       Subjective   Madhu Santoro reports: Patient reports that he is sore from last visit, he reports getting a good workout. Yesterday was one of his better days, he was able to get out and walk 4 times.       Objective       See Exercise, Manual, and Modality Logs for complete treatment.     Patient Education: HEP review  Exercise rationale/ pain free exercise performance  Alternate exercise positions  Verbal/Tactile cues to ensure correct exercise performance/technique       Assessment/Plan  Patient demonstrates good tolerance to therapeutic activities and neuromuscular re-ed tasks. He presents with increased stride length and decreased festering gait. Continued to focus on BIG movements and working on upper body mobility. Attempted some movements with both UE and LE and he continues to have a difficult time with the coordination especially with returning to beginning position. Dual tasks also continue to be challenging, more visible festering with tasks when he tries to have a conversation at the same time. Discussed continuing everything at home as he has been.       Progress per Plan of Care and Progress strengthening /stabilization /functional activity          Timed:         Manual Therapy:    -     mins  24766;     Therapeutic Exercise:    10     mins  16258;     Neuromuscular Ankush:    18    mins  48784;    Therapeutic Activity:     22     mins  06922;     Gait Training:           mins  84382;     Ultrasound:          mins  63802;    Ionto:                                   mins   40486  Self Care:                       -     mins  22408    Un-Timed:  Electrical Stimulation:         mins  70105 ( );  Dry Needling          mins self-pay  Traction          mins 15151  Re-Eval                               mins  72091  Group Therapy           ___ mins 49146    Timed Treatment:   50   mins   Total Treatment:     53   mins    Mylene Hernandez PT  Physical Therapist  [place] license #: [id number]

## 2024-08-22 NOTE — TELEPHONE ENCOUNTER
REQUESTING PRESCRIPTION FOR CRESTOR TABLET   [facility]

## 2024-08-23 RX ORDER — ROSUVASTATIN CALCIUM 20 MG/1
20 TABLET, COATED ORAL NIGHTLY
Qty: 90 TABLET | Refills: 1 | Status: SHIPPED | OUTPATIENT
Start: 2024-08-23

## 2024-08-26 ENCOUNTER — OFFICE VISIT (OUTPATIENT)
Dept: PAIN MEDICINE | Facility: CLINIC | Age: 79
End: 2024-08-26
Payer: MEDICARE

## 2024-08-26 VITALS
DIASTOLIC BLOOD PRESSURE: 60 MMHG | HEART RATE: 82 BPM | SYSTOLIC BLOOD PRESSURE: 119 MMHG | BODY MASS INDEX: 23.4 KG/M2 | OXYGEN SATURATION: 99 % | RESPIRATION RATE: 20 BRPM | TEMPERATURE: 97.6 F | WEIGHT: 154.4 LBS | HEIGHT: 68 IN

## 2024-08-26 DIAGNOSIS — M25.50 ARTHRALGIA OF MULTIPLE JOINTS: ICD-10-CM

## 2024-08-26 DIAGNOSIS — G89.29 CHRONIC PAIN OF BOTH HIPS: ICD-10-CM

## 2024-08-26 DIAGNOSIS — M54.50 CHRONIC BILATERAL LOW BACK PAIN WITHOUT SCIATICA: Primary | ICD-10-CM

## 2024-08-26 DIAGNOSIS — M51.37 DDD (DEGENERATIVE DISC DISEASE), LUMBOSACRAL: ICD-10-CM

## 2024-08-26 DIAGNOSIS — M25.552 CHRONIC PAIN OF BOTH HIPS: ICD-10-CM

## 2024-08-26 DIAGNOSIS — Z79.899 ENCOUNTER FOR LONG-TERM (CURRENT) USE OF HIGH-RISK MEDICATION: ICD-10-CM

## 2024-08-26 DIAGNOSIS — G89.29 CHRONIC BILATERAL LOW BACK PAIN WITHOUT SCIATICA: Primary | ICD-10-CM

## 2024-08-26 DIAGNOSIS — M25.551 CHRONIC PAIN OF BOTH HIPS: ICD-10-CM

## 2024-08-26 PROCEDURE — 1159F MED LIST DOCD IN RCRD: CPT

## 2024-08-26 PROCEDURE — 1160F RVW MEDS BY RX/DR IN RCRD: CPT

## 2024-08-26 PROCEDURE — 3074F SYST BP LT 130 MM HG: CPT

## 2024-08-26 PROCEDURE — 1125F AMNT PAIN NOTED PAIN PRSNT: CPT

## 2024-08-26 PROCEDURE — 3078F DIAST BP <80 MM HG: CPT

## 2024-08-26 PROCEDURE — 99214 OFFICE O/P EST MOD 30 MIN: CPT

## 2024-08-26 RX ORDER — HYDROCODONE BITARTRATE AND ACETAMINOPHEN 5; 325 MG/1; MG/1
1 TABLET ORAL 3 TIMES DAILY PRN
Qty: 90 TABLET | Refills: 0 | Status: SHIPPED | OUTPATIENT
Start: 2024-08-26

## 2024-08-26 NOTE — PROGRESS NOTES
"CHIEF COMPLAINT  Back and joint pain    Subjective   Madhu Santoro is a 78 y.o. male  who presents for follow-up.  He has a history of back and joint pain. He reports that his pain level has fluctuated since his last office visit and varies based on activity level.      Today pain is 9/10VAS in severity (no acute distress noted at this time). Pain is located in his lumbar spine and throughout multiple joints (hands, feet, knees). Describes this pain as a nearly continuous aching, burning, and throbbing. Pain is worsened by prolonged walking or standing, increased physical activity, and bending/twisitng. Pain improves with rest/reposition, use of a heating pad, and medication. He continues with PT twice a week with mixed results. He continues to report bilateral leg weakness and says they feel \"heavy\".       Continues with Hydrocodone 5mg 2-3/day and OTC Tylenol PRN. Denies any side side effects from the regimen including somnolence and constipation. The regimen helps \"take the edge off\". Notes improvement in activity and function with regimen. ADL's by self. Denies any bowel or bladder changes. He takes Elavil 50mg at night for peripheral neuropathy (Dr. Russell) and Allopurinol for gout (Dr. 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 The current episode started more than 1 year ago. The problem occurs constantly. The problem is unchanged. The pain is present in the lumbar spine. The quality of the pain is described as aching (throbbing). Radiates to: to bilateral legs L > R  The pain is at a severity of 9/10. The symptoms are aggravated by standing, sitting, twisting, bending, lying down and position (walking long distances, weather changes). Stiffness is present All day. Associated symptoms include weakness. Pertinent negatives include no abdominal pain, chest pain, dysuria, fever, " headaches or numbness. He has tried heat and analgesics (rest/reposition, PT in the past (this caused increased pain)) for the symptoms. The treatment provided mild relief.   Joint Pain  This is a chronic (bilateral hands, left shoulder, bilateral knees) problem. The current episode started more than 1 year ago. The problem occurs daily. The problem has been waxing and waning. Associated symptoms include arthralgias and weakness. Pertinent negatives include no abdominal pain, chest pain, chills, congestion, coughing, fatigue, fever, headaches, joint swelling or numbness. The symptoms are aggravated by walking, standing and bending (weather changes, increased physical activity, ). He has tried oral narcotics, position changes, rest, heat, lying down and acetaminophen for the symptoms. The treatment provided moderate relief.     PEG Assessment   What number best describes your pain on average in the past week?9  What number best describes how, during the past week, pain has interfered with your enjoyment of life?9  What number best describes how, during the past week, pain has interfered with your general activity?  9    Review of Pertinent Medical Data ---      The following portions of the patient's history were reviewed and updated as appropriate: allergies, current medications, past family history, past medical history, past social history, past surgical history, and problem list.    Review of Systems   Constitutional:  Negative for activity change (less), chills, fatigue and fever.   HENT:  Negative for congestion.    Respiratory:  Negative for cough and chest tightness.    Cardiovascular:  Negative for chest pain.   Gastrointestinal:  Negative for abdominal pain, constipation and diarrhea.   Genitourinary:  Negative for difficulty urinating and dysuria.   Musculoskeletal:  Positive for arthralgias and back pain. Negative for joint swelling.   Neurological:  Positive for weakness. Negative for dizziness,  "light-headedness, numbness and headaches.   Psychiatric/Behavioral:  Positive for agitation. Negative for sleep disturbance and suicidal ideas. The patient is nervous/anxious.      I have reviewed and confirmed the accuracy of the ROS as documented by the MA/LPN/RN MY Perez    Vitals:    08/26/24 1447   BP: 119/60   BP Location: Left arm   Patient Position: Sitting   Cuff Size: Adult   Pulse: 82   Resp: 20   Temp: 97.6 °F (36.4 °C)   TempSrc: Temporal   SpO2: 99%   Weight: 70 kg (154 lb 6.4 oz)   Height: 172.7 cm (68\")   PainSc:   9     Objective   Physical Exam  Constitutional:       Appearance: Normal appearance.   HENT:      Head: Normocephalic.   Cardiovascular:      Rate and Rhythm: Normal rate and regular rhythm.   Pulmonary:      Effort: Pulmonary effort is normal.      Breath sounds: Decreased air movement present.   Musculoskeletal:      Cervical back: Normal range of motion.      Lumbar back: Tenderness present. Decreased range of motion. Positive right straight leg raise test and positive left straight leg raise test.      Comments: Diffuse arthritic changes   Skin:     General: Skin is warm and dry.      Capillary Refill: Capillary refill takes less than 2 seconds.   Neurological:      General: No focal deficit present.      Mental Status: He is alert and oriented to person, place, and time.      Gait: Gait abnormal (slowed, ambulates with walker).   Psychiatric:         Mood and Affect: Mood normal.         Behavior: Behavior normal.         Thought Content: Thought content normal.         Cognition and Memory: Cognition normal.       Assessment & Plan   Diagnoses and all orders for this visit:    1. Chronic bilateral low back pain without sciatica (Primary)    2. DDD (degenerative disc disease), lumbosacral  -     HYDROcodone-acetaminophen (NORCO) 5-325 MG per tablet; Take 1 tablet by mouth 3 (Three) Times a Day As Needed for Severe Pain or Moderate Pain. 30 day supply. DNF 8/28/24  " Dispense: 90 tablet; Refill: 0    3. Chronic pain of both hips  -     HYDROcodone-acetaminophen (NORCO) 5-325 MG per tablet; Take 1 tablet by mouth 3 (Three) Times a Day As Needed for Severe Pain or Moderate Pain. 30 day supply. DNF 8/28/24  Dispense: 90 tablet; Refill: 0    4. Arthralgia of multiple joints  -     HYDROcodone-acetaminophen (NORCO) 5-325 MG per tablet; Take 1 tablet by mouth 3 (Three) Times a Day As Needed for Severe Pain or Moderate Pain. 30 day supply. DNF 8/28/24  Dispense: 90 tablet; Refill: 0    5. Encounter for long-term (current) use of high-risk medication      Madhu Santoro reports a pain score of 9.  Given his pain assessment as noted, treatment options were discussed and the following options were decided upon as a follow-up plan to address the patient's pain: continuation of current treatment plan for pain and prescription for opiod analgesics.    --- The urine drug screen confirmation from 4/22/24 has been reviewed and the result is appropriate based on patient history and RUTH report  --- The patient signed an updated copy of the controlled substance agreement on 6/24/24   --- ORT performed on 6/24/24 -- low risk  --- Continue Hydrocodone. DNF 8/28/24. Patient appears stable with current regimen. No adverse effects. Regarding continuation of opioids, there is no evidence of aberrant behavior or any red flags.  The patient continues with appropriate response to opioid therapy. ADL's remain intact by self.   --- Follow-up 2 months or sooner if needed     RUTH REPORT  As part of the patient's treatment plan, I am prescribing controlled substances. The patient has been made aware of appropriate use of such medications, including potential risk of somnolence, limited ability to drive and/or work safely, and the potential for dependence or overdose. It has also been made clear that these medications are for use by this patient only, without concomitant use of alcohol or other substances  unless prescribed.     Patient has completed prescribing agreement detailing terms of continued prescribing of controlled substances, including monitoring RUTH reports, urine drug screening, and pill counts if necessary. The patient is aware that inappropriate use will results in cessation of prescribing such medications.    As the clinician, I personally reviewed the RUTH from 8/26/24 while the patient was in the office today.    History and physical exam exhibit continued safe and appropriate use of controlled substances.    Dictated utilizing Dragon dictation.

## 2024-08-27 ENCOUNTER — HOSPITAL ENCOUNTER (EMERGENCY)
Facility: HOSPITAL | Age: 79
Discharge: HOME | End: 2024-08-27
Attending: EMERGENCY MEDICINE
Payer: MEDICARE

## 2024-08-27 ENCOUNTER — APPOINTMENT (OUTPATIENT)
Dept: GENERAL RADIOLOGY | Facility: HOSPITAL | Age: 79
End: 2024-08-27
Payer: MEDICARE

## 2024-08-27 ENCOUNTER — TREATMENT (OUTPATIENT)
Dept: PHYSICAL THERAPY | Facility: CLINIC | Age: 79
End: 2024-08-27
Payer: MEDICARE

## 2024-08-27 ENCOUNTER — APPOINTMENT (OUTPATIENT)
Dept: CT IMAGING | Facility: HOSPITAL | Age: 79
End: 2024-08-27
Payer: MEDICARE

## 2024-08-27 VITALS
SYSTOLIC BLOOD PRESSURE: 165 MMHG | RESPIRATION RATE: 18 BRPM | WEIGHT: 150 LBS | BODY MASS INDEX: 22.73 KG/M2 | DIASTOLIC BLOOD PRESSURE: 79 MMHG | HEART RATE: 82 BPM | HEIGHT: 68 IN | TEMPERATURE: 98 F | OXYGEN SATURATION: 97 %

## 2024-08-27 DIAGNOSIS — S50.311A ABRASION OF RIGHT ELBOW, INITIAL ENCOUNTER: ICD-10-CM

## 2024-08-27 DIAGNOSIS — S50.312A ABRASION OF LEFT ELBOW, INITIAL ENCOUNTER: ICD-10-CM

## 2024-08-27 DIAGNOSIS — R29.898 DECREASED STRENGTH OF LOWER EXTREMITY: Primary | ICD-10-CM

## 2024-08-27 DIAGNOSIS — S60.419A ABRASION OF FINGER, INITIAL ENCOUNTER: ICD-10-CM

## 2024-08-27 DIAGNOSIS — S01.81XA LACERATION OF FOREHEAD, INITIAL ENCOUNTER: Primary | ICD-10-CM

## 2024-08-27 DIAGNOSIS — S60.221A CONTUSION OF RIGHT HAND, INITIAL ENCOUNTER: ICD-10-CM

## 2024-08-27 DIAGNOSIS — R26.89 OTHER ABNORMALITIES OF GAIT AND MOBILITY: ICD-10-CM

## 2024-08-27 DIAGNOSIS — S09.90XA INJURY OF HEAD, INITIAL ENCOUNTER: ICD-10-CM

## 2024-08-27 PROCEDURE — 72125 CT NECK SPINE W/O DYE: CPT

## 2024-08-27 PROCEDURE — 99284 EMERGENCY DEPT VISIT MOD MDM: CPT

## 2024-08-27 PROCEDURE — 70450 CT HEAD/BRAIN W/O DYE: CPT

## 2024-08-27 PROCEDURE — 97112 NEUROMUSCULAR REEDUCATION: CPT

## 2024-08-27 PROCEDURE — 73130 X-RAY EXAM OF HAND: CPT

## 2024-08-27 PROCEDURE — 97530 THERAPEUTIC ACTIVITIES: CPT

## 2024-08-27 RX ORDER — LIDOCAINE HYDROCHLORIDE AND EPINEPHRINE 10; 10 MG/ML; UG/ML
10 INJECTION, SOLUTION INFILTRATION; PERINEURAL ONCE
Status: COMPLETED | OUTPATIENT
Start: 2024-08-27 | End: 2024-08-27

## 2024-08-27 RX ADMIN — LIDOCAINE HYDROCHLORIDE AND EPINEPHRINE 10 ML: 10; 10 INJECTION, SOLUTION INFILTRATION; PERINEURAL at 18:28
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
## 2024-08-27 NOTE — PROGRESS NOTES
Physical Therapy Daily Treatment Note  [facility]  [address], [address]  [place] [address]     Patient: Madhu Santoro  : 1945  Referring practitioner: Damian Sanchez MD  Today's Date: 2024    VISIT#: 25    Visit Diagnoses:    ICD-10-CM ICD-9-CM   1. Decreased strength of lower extremity  R29.898 729.89   2. Other abnormalities of gait and mobility  R26.89 781.2       Subjective   Madhu Santoro reports:   I feel better today, I don't really know what or why, I just know that I feel better.      Objective   See Exercise, Manual, and Modality Logs for complete treatment.     Patient Education:   HEP review  Alternate exercise positions  Verbal/Tactile cues to ensure correct exercise performance/technique     Assessment/Plan  Patient demonstrates good tolerance to therapeutic activities and neuromuscular re-ed tasks today, improving endurance and general countenance from last seen by this PTA.    We did perform leg press at onset of session which may have resulted in incr LE fatigue at end of session, consider leg press later in session.       Progress per Plan of Care and Progress strengthening /stabilization /functional activity          Timed:         Manual Therapy:    -     mins  79796;     Therapeutic Exercise:         mins  34944;     Neuromuscular Ankush:    15    mins  77588;    Therapeutic Activity:     15     mins  84493;     Gait Training:           mins  96589;     Ultrasound:          mins  39193;    Ionto:                                   mins  97727  Self Care:                       -     mins  69732    Un-Timed:  Electrical Stimulation:         mins  55708 (MC );  Dry Needling          mins self-pay  Traction          mins 59308  Re-Eval                               mins  50091  Group Therapy           ___ mins 57939    Timed Treatment:   30   mins   Total Treatment:     50   mins    Shane Rivero, [facility]  [place] License  #[id number]  Physical Therapist Assistant
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
## 2024-08-28 NOTE — DISCHARGE INSTRUCTIONS
Rest as needed.  Sutures will slowly dissolve in about 1 to 2 weeks.  Please return to the emergency room for any worsening pain, fevers, weakness, nausea, dizziness, vision changes, bleeding or any other concerns.

## 2024-08-30 ENCOUNTER — TELEPHONE (OUTPATIENT)
Dept: FAMILY MEDICINE CLINIC | Facility: CLINIC | Age: 79
End: 2024-08-30
Payer: MEDICARE

## 2024-08-30 NOTE — TELEPHONE ENCOUNTER
Caller: Brooke Santoro ([facility]CS)    Relationship to patient: Emergency Contact    Best call back number:   [phone]    New or established patient?  [] New  [x] Established    Date of discharge: 08/27    Facility discharged from: [facility]    Diagnosis/Symptoms: PATIENT FELL ON TUESDAY    Length of stay (If applicable): SAME DAY    Specialty Only: Did you see a [facility] provider?    [] Yes  [x] No  If so, who? NA

## 2024-08-30 NOTE — TELEPHONE ENCOUNTER
Spoke with patient to inform her that our office doesn't have any same day appt available for today. Advised patient to go to the ED oe UC. Patient declined wants to be scheduled with provider. Patient has been scheduled and also advised to go to the ER or UC if his symptoms worsen. Patient voiced understanding and had no further question

## 2024-09-03 ENCOUNTER — TELEPHONE (OUTPATIENT)
Dept: CASE MANAGEMENT | Facility: OTHER | Age: 79
End: 2024-09-03
Payer: MEDICARE
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
## 2024-09-04 ENCOUNTER — OFFICE VISIT (OUTPATIENT)
Dept: FAMILY MEDICINE CLINIC | Facility: CLINIC | Age: 79
End: 2024-09-04
Payer: MEDICARE

## 2024-09-04 ENCOUNTER — TELEPHONE (OUTPATIENT)
Dept: CASE MANAGEMENT | Facility: OTHER | Age: 79
End: 2024-09-04
Payer: MEDICARE

## 2024-09-04 VITALS
TEMPERATURE: 97.9 F | RESPIRATION RATE: 16 BRPM | SYSTOLIC BLOOD PRESSURE: 130 MMHG | WEIGHT: 154 LBS | DIASTOLIC BLOOD PRESSURE: 60 MMHG | HEIGHT: 68 IN | BODY MASS INDEX: 23.34 KG/M2 | HEART RATE: 84 BPM | OXYGEN SATURATION: 97 %

## 2024-09-04 DIAGNOSIS — S01.81XD LACERATION OF FOREHEAD, SUBSEQUENT ENCOUNTER: Primary | ICD-10-CM

## 2024-09-04 DIAGNOSIS — Z09 HOSPITAL DISCHARGE FOLLOW-UP: ICD-10-CM

## 2024-09-04 DIAGNOSIS — L03.114 CELLULITIS OF LEFT UPPER EXTREMITY: ICD-10-CM

## 2024-09-04 PROCEDURE — 3078F DIAST BP <80 MM HG: CPT | Performed by: FAMILY MEDICINE

## 2024-09-04 PROCEDURE — 1125F AMNT PAIN NOTED PAIN PRSNT: CPT | Performed by: FAMILY MEDICINE

## 2024-09-04 PROCEDURE — 1111F DSCHRG MED/CURRENT MED MERGE: CPT | Performed by: FAMILY MEDICINE

## 2024-09-04 PROCEDURE — 1160F RVW MEDS BY RX/DR IN RCRD: CPT | Performed by: FAMILY MEDICINE

## 2024-09-04 PROCEDURE — 1159F MED LIST DOCD IN RCRD: CPT | Performed by: FAMILY MEDICINE

## 2024-09-04 PROCEDURE — 99213 OFFICE O/P EST LOW 20 MIN: CPT | Performed by: FAMILY MEDICINE

## 2024-09-04 PROCEDURE — 3075F SYST BP GE 130 - 139MM HG: CPT | Performed by: FAMILY MEDICINE

## 2024-09-04 RX ORDER — CEPHALEXIN 500 MG/1
500 CAPSULE ORAL 4 TIMES DAILY
Qty: 30 CAPSULE | Refills: 0 | Status: SHIPPED | OUTPATIENT
Start: 2024-09-04

## 2024-09-06 DIAGNOSIS — G60.9 PERIPHERAL NEUROPATHY, IDIOPATHIC: Primary | ICD-10-CM

## 2024-09-06 RX ORDER — AMITRIPTYLINE HYDROCHLORIDE 50 MG/1
50 TABLET ORAL NIGHTLY
Qty: 30 TABLET | Refills: 1 | Status: SHIPPED | OUTPATIENT
Start: 2024-09-06

## 2024-09-09 ENCOUNTER — TELEPHONE (OUTPATIENT)
Dept: FAMILY MEDICINE CLINIC | Facility: CLINIC | Age: 79
End: 2024-09-09
Payer: MEDICARE

## 2024-09-09 NOTE — TELEPHONE ENCOUNTER
LEFT MESSAGE FOR PT TO  SAMPLES OF ELIQUIS 5MGS  OK FOR THE HUB TO RELAY MESSAGE  MEDICATION AT THE  TO

## 2024-09-24 DIAGNOSIS — M25.552 CHRONIC PAIN OF BOTH HIPS: ICD-10-CM

## 2024-09-24 DIAGNOSIS — G89.29 CHRONIC PAIN OF BOTH HIPS: ICD-10-CM

## 2024-09-24 DIAGNOSIS — G20.A1 PARKINSON'S DISEASE WITHOUT DYSKINESIA OR FLUCTUATING MANIFESTATIONS: ICD-10-CM

## 2024-09-24 DIAGNOSIS — M25.50 ARTHRALGIA OF MULTIPLE JOINTS: ICD-10-CM

## 2024-09-24 DIAGNOSIS — M25.551 CHRONIC PAIN OF BOTH HIPS: ICD-10-CM

## 2024-09-24 DIAGNOSIS — M51.379 DDD (DEGENERATIVE DISC DISEASE), LUMBOSACRAL: ICD-10-CM

## 2024-09-24 RX ORDER — CARBIDOPA AND LEVODOPA 25; 100 MG/1; MG/1
TABLET ORAL
Qty: 90 TABLET | Refills: 2 | Status: SHIPPED | OUTPATIENT
Start: 2024-09-24 | End: 2024-09-26 | Stop reason: DRUGHIGH

## 2024-09-24 RX ORDER — HYDROCODONE BITARTRATE AND ACETAMINOPHEN 5; 325 MG/1; MG/1
1 TABLET ORAL 3 TIMES DAILY PRN
Qty: 90 TABLET | Refills: 0 | Status: SHIPPED | OUTPATIENT
Start: 2024-09-24

## 2024-09-26 ENCOUNTER — OFFICE VISIT (OUTPATIENT)
Dept: NEUROLOGY | Facility: CLINIC | Age: 79
End: 2024-09-26
Payer: MEDICARE

## 2024-09-26 VITALS
DIASTOLIC BLOOD PRESSURE: 82 MMHG | WEIGHT: 154 LBS | OXYGEN SATURATION: 99 % | BODY MASS INDEX: 23.42 KG/M2 | HEART RATE: 79 BPM | SYSTOLIC BLOOD PRESSURE: 150 MMHG

## 2024-09-26 DIAGNOSIS — G60.9 PERIPHERAL NEUROPATHY, IDIOPATHIC: ICD-10-CM

## 2024-09-26 DIAGNOSIS — G20.A1 PARKINSON'S DISEASE WITHOUT DYSKINESIA OR FLUCTUATING MANIFESTATIONS: Primary | ICD-10-CM

## 2024-09-26 RX ORDER — GABAPENTIN 100 MG/1
100 CAPSULE ORAL 2 TIMES DAILY
Qty: 60 CAPSULE | Refills: 0 | Status: SHIPPED | OUTPATIENT
Start: 2024-09-26

## 2024-09-26 RX ORDER — CARBIDOPA/LEVODOPA 25MG-250MG
1 TABLET ORAL 3 TIMES DAILY
Qty: 90 TABLET | Refills: 0 | Status: SHIPPED | OUTPATIENT
Start: 2024-09-26

## 2024-09-27 ENCOUNTER — OFFICE VISIT (OUTPATIENT)
Dept: GASTROENTEROLOGY | Facility: CLINIC | Age: 79
End: 2024-09-27
Payer: MEDICARE

## 2024-09-27 VITALS
TEMPERATURE: 97.3 F | HEART RATE: 75 BPM | DIASTOLIC BLOOD PRESSURE: 68 MMHG | HEIGHT: 68 IN | SYSTOLIC BLOOD PRESSURE: 136 MMHG | WEIGHT: 155.6 LBS | BODY MASS INDEX: 23.58 KG/M2

## 2024-09-27 DIAGNOSIS — K22.70 BARRETT'S ESOPHAGUS WITHOUT DYSPLASIA: ICD-10-CM

## 2024-09-27 DIAGNOSIS — K21.9 GASTROESOPHAGEAL REFLUX DISEASE, UNSPECIFIED WHETHER ESOPHAGITIS PRESENT: ICD-10-CM

## 2024-09-27 DIAGNOSIS — R13.10 DYSPHAGIA, UNSPECIFIED TYPE: Primary | ICD-10-CM

## 2024-09-27 DIAGNOSIS — R05.9 COUGH, UNSPECIFIED TYPE: ICD-10-CM

## 2024-09-27 DIAGNOSIS — Z86.0100 PERSONAL HISTORY OF COLONIC POLYPS: ICD-10-CM

## 2024-10-01 ENCOUNTER — TELEPHONE (OUTPATIENT)
Dept: GASTROENTEROLOGY | Facility: CLINIC | Age: 79
End: 2024-10-01
Payer: MEDICARE

## 2024-10-01 DIAGNOSIS — G62.9 PERIPHERAL POLYNEUROPATHY: ICD-10-CM

## 2024-10-01 DIAGNOSIS — G20.A1 PARKINSON'S DISEASE WITHOUT DYSKINESIA OR FLUCTUATING MANIFESTATIONS: Primary | ICD-10-CM

## 2024-10-01 NOTE — TELEPHONE ENCOUNTER
----- Message from Marlene Cordova sent at 9/27/2024  2:39 PM EDT -----  Can we get records from [facility]? I need nutrition and speech evaluations.

## 2024-10-01 NOTE — TELEPHONE ENCOUNTER
Called [facility]. Requested ST and nutritionist notes.   Requested they be faxed to [phone].

## 2024-10-02 ENCOUNTER — TELEPHONE (OUTPATIENT)
Dept: PAIN MEDICINE | Facility: CLINIC | Age: 79
End: 2024-10-02
Payer: MEDICARE

## 2024-10-02 DIAGNOSIS — M25.50 ARTHRALGIA OF MULTIPLE JOINTS: ICD-10-CM

## 2024-10-02 DIAGNOSIS — G89.29 CHRONIC PAIN OF BOTH HIPS: ICD-10-CM

## 2024-10-02 DIAGNOSIS — M51.379 DDD (DEGENERATIVE DISC DISEASE), LUMBOSACRAL: ICD-10-CM

## 2024-10-02 DIAGNOSIS — M25.552 CHRONIC PAIN OF BOTH HIPS: ICD-10-CM

## 2024-10-02 DIAGNOSIS — M25.551 CHRONIC PAIN OF BOTH HIPS: ICD-10-CM

## 2024-10-02 RX ORDER — HYDROCODONE BITARTRATE AND ACETAMINOPHEN 5; 325 MG/1; MG/1
1 TABLET ORAL 3 TIMES DAILY PRN
Qty: 90 TABLET | Refills: 0 | Status: SHIPPED | OUTPATIENT
Start: 2024-10-02

## 2024-10-02 NOTE — TELEPHONE ENCOUNTER
Provider: LENA    Caller: ALLEN    Pharmacy: [facility]    Phone Number: [phone]    Reason for Call: ALLEN WANTED TO CLARIFY THAT PATIENT DIDN'T GET HIS PRESCRIPTION FOR SEPTEMBER BECAUSE HIS REGULAR PHARMACY WAS OUT. SHE STATES THEY ARE SWITCHING ALL OF PATIENT'S PRESCRIPTIONS TO [facility].

## 2024-10-02 NOTE — TELEPHONE ENCOUNTER
Reviewed UDS and RUTH. Both updated and appropriate. Refill appropriate.    Covering for Marisol PEPE

## 2024-10-02 NOTE — TELEPHONE ENCOUNTER
Caller: Brooke Santoro ([facility]CS)    Relationship: Emergency Contact    Best call back number: [phone]    Requested Prescriptions:   Requested Prescriptions     Pending Prescriptions Disp Refills    HYDROcodone-acetaminophen (NORCO) 5-325 MG per tablet 90 tablet 0     Sig: Take 1 tablet by mouth 3 (Three) Times a Day As Needed for Severe Pain or Moderate Pain. 30 day supply. DNF 9/28/24        Pharmacy where request should be sent: [facility] - [place], [place] - [address] [facility] [facility] - [phone]  - [phone] FX     Last office visit with prescribing clinician: 8/26/2024   Last telemedicine visit with prescribing clinician: Visit date not found   Next office visit with prescribing clinician: 10/31/2024     Additional details provided by patient: PATIENTS WIFE CALLED ASKING IF THE REFILL CAN BE SENT TO A NEW PHARMACY BECAUSE THEIR PREVIOUS PHARMACY IS OUT OF STOCK. PLEASE SEND TO [facility] IN [facility]     Does the patient have less than a 3 day supply:  [] Yes  [x] No    Daniela De Leon Rep   10/02/24 14:21 EDT

## 2024-10-06 RX ORDER — RABEPRAZOLE SODIUM 20 MG/1
20 TABLET, DELAYED RELEASE ORAL DAILY
Qty: 90 TABLET | Refills: 0 | Status: SHIPPED | OUTPATIENT
Start: 2024-10-06

## 2024-10-11 ENCOUNTER — TELEPHONE (OUTPATIENT)
Dept: GASTROENTEROLOGY | Facility: CLINIC | Age: 79
End: 2024-10-11
Payer: MEDICARE

## 2024-10-11 ENCOUNTER — HOSPITAL ENCOUNTER (OUTPATIENT)
Dept: GENERAL RADIOLOGY | Facility: HOSPITAL | Age: 79
Discharge: HOME | End: 2024-10-11
Payer: MEDICARE

## 2024-10-11 DIAGNOSIS — K22.70 BARRETT'S ESOPHAGUS WITHOUT DYSPLASIA: ICD-10-CM

## 2024-10-11 DIAGNOSIS — R05.9 COUGH, UNSPECIFIED TYPE: ICD-10-CM

## 2024-10-11 DIAGNOSIS — K21.9 GASTROESOPHAGEAL REFLUX DISEASE, UNSPECIFIED WHETHER ESOPHAGITIS PRESENT: ICD-10-CM

## 2024-10-11 DIAGNOSIS — R13.10 DYSPHAGIA, UNSPECIFIED TYPE: Primary | ICD-10-CM

## 2024-10-11 DIAGNOSIS — R13.10 DYSPHAGIA, UNSPECIFIED TYPE: ICD-10-CM

## 2024-10-11 PROCEDURE — 74221 X-RAY XM ESOPHAGUS 2CNTRST: CPT

## 2024-10-11 NOTE — PROGRESS NOTES
Please call and tell the patient or his spouse Brooke who was likely present during examination that he had parvin aspiration on his swallowing exam.  This certainly puts him at risk with eating going forward.  Aspiration can lead to things such as pneumonia.  I have ordered a special study to be completed by a speech therapist here at [facility].  I would not recommend removing his PEG tube at this time as we may need to resume tube feeding as he may not be safe for food by mouth going forward.

## 2024-10-11 NOTE — TELEPHONE ENCOUNTER
Hub staff attempted to follow warm transfer process and was unsuccessful     Caller: ANALIA WITH RADIOLOGY    Relationship to patient:     Best call back number: [phone]    Patient is needing: ANALIA WITH RADIOLOGY CALLED AND SAID HER RADIOLOGIST NEEDED TO SPEAK TO BEBA MCKEON ABOUT THIS PT'S ESOPHAGRAM. UNABLE TO WT, PLEASE CALL BACK.

## 2024-10-11 NOTE — TELEPHONE ENCOUNTER
Patient called. Unable to content phone call. Message states unable to complete call at this time. Will attempt another call later.

## 2024-10-11 NOTE — TELEPHONE ENCOUNTER
----- Message from Marlene Cordova sent at 10/11/2024  2:39 PM EDT -----  Please call and tell the patient or his spouse Brooke who was likely present during examination that he had parvin aspiration on his swallowing exam.  This certainly puts him at risk with eating going forward.  Aspiration can lead to things such as pneumonia.  I have ordered a special study to be completed by a speech therapist here at [facility].  I would not recommend removing his PEG tube at this time as we may need to resume tube feeding as he may not be safe for food by mouth going forward.

## 2024-10-14 NOTE — TELEPHONE ENCOUNTER
Called pt and spoke with wife (ok per ARPAN) and advised of Marlene MONTERO's note.  She verbalized understanding.

## 2024-10-16 ENCOUNTER — LAB (OUTPATIENT)
Dept: LAB | Facility: HOSPITAL | Age: 79
End: 2024-10-16
Payer: MEDICARE

## 2024-10-16 DIAGNOSIS — D45 POLYCYTHEMIA VERA: ICD-10-CM

## 2024-10-16 LAB
BASOPHILS # BLD AUTO: 0.17 10*3/MM3 (ref 0–0.2)
BASOPHILS NFR BLD AUTO: 0.6 % (ref 0–1.5)
DEPRECATED RDW RBC AUTO: 51.6 FL (ref 37–54)
EOSINOPHIL # BLD AUTO: 0.97 10*3/MM3 (ref 0–0.4)
EOSINOPHIL NFR BLD AUTO: 3.5 % (ref 0.3–6.2)
ERYTHROCYTE [DISTWIDTH] IN BLOOD BY AUTOMATED COUNT: 21.9 % (ref 12.3–15.4)
HCT VFR BLD AUTO: 48.1 % (ref 37.5–51)
HGB BLD-MCNC: 13.6 G/DL (ref 13–17.7)
IMM GRANULOCYTES # BLD AUTO: 0.45 10*3/MM3 (ref 0–0.05)
IMM GRANULOCYTES NFR BLD AUTO: 1.6 % (ref 0–0.5)
LYMPHOCYTES # BLD AUTO: 1.61 10*3/MM3 (ref 0.7–3.1)
LYMPHOCYTES NFR BLD AUTO: 5.9 % (ref 19.6–45.3)
MCH RBC QN AUTO: 20.5 PG (ref 26.6–33)
MCHC RBC AUTO-ENTMCNC: 28.3 G/DL (ref 31.5–35.7)
MCV RBC AUTO: 72.7 FL (ref 79–97)
MONOCYTES # BLD AUTO: 2.03 10*3/MM3 (ref 0.1–0.9)
MONOCYTES NFR BLD AUTO: 7.4 % (ref 5–12)
NEUTROPHILS NFR BLD AUTO: 22.2 10*3/MM3 (ref 1.7–7)
NEUTROPHILS NFR BLD AUTO: 81 % (ref 42.7–76)
NRBC BLD AUTO-RTO: 0.1 /100 WBC (ref 0–0.2)
PLATELET # BLD AUTO: 730 10*3/MM3 (ref 140–450)
PMV BLD AUTO: 9.9 FL (ref 6–12)
RBC # BLD AUTO: 6.62 10*6/MM3 (ref 4.14–5.8)
WBC NRBC COR # BLD AUTO: 27.43 10*3/MM3 (ref 3.4–10.8)

## 2024-10-16 PROCEDURE — 36415 COLL VENOUS BLD VENIPUNCTURE: CPT

## 2024-10-16 PROCEDURE — 85025 COMPLETE CBC W/AUTO DIFF WBC: CPT

## 2024-10-17 ENCOUNTER — TELEPHONE (OUTPATIENT)
Dept: GASTROENTEROLOGY | Facility: CLINIC | Age: 79
End: 2024-10-17
Payer: MEDICARE

## 2024-10-17 NOTE — TELEPHONE ENCOUNTER
----- Message from Marlene Cordova sent at 9/27/2024  2:39 PM EDT -----  Can we get records from [facility]? I need nutrition and speech evaluations.

## 2024-10-17 NOTE — TELEPHONE ENCOUNTER
Call placed to the medical records dept at [facility]. Left VM requesting medical records on patient as per Marlene's note. Request they be faxed to [phone]. Left office number if there were any questions.

## 2024-10-21 ENCOUNTER — HOSPITAL ENCOUNTER (OUTPATIENT)
Dept: GENERAL RADIOLOGY | Facility: HOSPITAL | Age: 79
Discharge: HOME | End: 2024-10-21
Admitting: PHYSICIAN ASSISTANT
Payer: MEDICARE

## 2024-10-21 DIAGNOSIS — R13.12 OROPHARYNGEAL DYSPHAGIA: Primary | ICD-10-CM

## 2024-10-21 DIAGNOSIS — R13.10 DYSPHAGIA, UNSPECIFIED TYPE: ICD-10-CM

## 2024-10-21 PROCEDURE — 63710000001 BARIUM SULFATE 40 % RECONSTITUTED SUSPENSION: Performed by: PHYSICIAN ASSISTANT

## 2024-10-21 PROCEDURE — 74230 X-RAY XM SWLNG FUNCJ C+: CPT

## 2024-10-21 PROCEDURE — 63710000001 BARIUM SULFATE 40 % SUSPENSION: Performed by: PHYSICIAN ASSISTANT

## 2024-10-21 PROCEDURE — 63710000001 BARIUM SULFATE 40 % PASTE: Performed by: PHYSICIAN ASSISTANT

## 2024-10-21 PROCEDURE — A9270 NON-COVERED ITEM OR SERVICE: HCPCS | Performed by: PHYSICIAN ASSISTANT

## 2024-10-21 PROCEDURE — 92611 MOTION FLUOROSCOPY/SWALLOW: CPT | Performed by: SPEECH-LANGUAGE PATHOLOGIST

## 2024-10-21 RX ADMIN — BARIUM SULFATE 5 ML: 400 PASTE ORAL at 13:42

## 2024-10-21 RX ADMIN — BARIUM SULFATE 50 ML: 400 SUSPENSION ORAL at 13:42

## 2024-10-21 RX ADMIN — BARIUM SULFATE 55 ML: 0.81 POWDER, FOR SUSPENSION ORAL at 13:42

## 2024-10-21 NOTE — MBS/VFSS/FEES
Outpatient Speech Language Pathology   Adult Swallow Initial Evaluation  [facility]     Patient Name: Madhu Santoro  : 1945  MRN: [id number]  Today's Date: 10/21/2024         Visit Date: 10/21/2024   Patient Active Problem List   Diagnosis    Anxiety    Arthritis    Coronary artery disease due to lipid rich plaque    HLD (hyperlipidemia)    Benign essential hypertension    DDD (degenerative disc disease), lumbosacral    Cerebrovascular accident    Encounter for screening for cardiovascular disorders    Gastroesophageal reflux disease    Hyperlipidemia    Stenosis of carotid artery    Former smoker    History of cardiac catheterization    S/P ablation of atrial flutter    Typical atrial flutter    S/P CABG (coronary artery bypass graft)    Adenomatous polyp of ascending colon    Abdominal bloating    Stroke    PAD (peripheral artery disease)    Acute cholecystitis    Right upper quadrant abdominal pain    Chronic pain of both hips    Chronic bilateral low back pain without sciatica    Sacroiliac joint dysfunction of both sides    Encounter for long-term (current) use of high-risk medication    Lumbar facet arthropathy    Stroke-like symptoms    CVA (cerebral vascular accident)    Personal history of colonic polyps    Arthralgia of multiple joints    Iron deficiency    Thrombocytosis    Left ureteral stone    Obstructive uropathy    Chronic anticoagulation    Facial skin lesion    Iron deficiency anemia    Polycythemia    Neuropathy    Weakness    Pneumonia    Close exposure to COVID-19 virus    Polycythemia vera    Gout    COVID-19    Cytokine release syndrome, grade 2    Anemia    History of CVA (cerebrovascular accident)    S/P percutaneous endoscopic gastrostomy (PEG) tube placement    Mandel esophagus    Sacral decubitus ulcer    Oral phase dysphagia    Acute blood loss anemia    Orthostatic dizziness    Orthostatic hypotension    Bilateral leg weakness        Past Medical History:   Diagnosis Date  "   Anxiety     Arthritis     Atrial flutter     Status post cavotricuspid isthmus ablation by Dr. Gustafson on 4/18/17    Mandel esophagus     Benign essential hypertension     CAD (coronary artery disease)     3 vessel CABG 4/11/17 by Dr. Cortez: ROSARIO-prox LAD, SVG-OM1, SVG-OM3    Carotid artery disease     Status post carotid endarterectomy - USG 4/10/17: 50-59% NICHELLE, 1-15% LICA.     Colonic polyp     COVID-19 long hauler     Cyst of pancreas     DDD (degenerative disc disease), lumbosacral     GERD (gastroesophageal reflux disease)     H/O bone density study 2013    H/O complete eye exam 2014    History of kidney stone     HLD (hyperlipidemia)     Hypertension     Kidney stone     8/22/22    Lipid screening 05/31/2013    Low back pain     physical therapy [facility] rehab 5-12-10    Parkinson disease     Screening for prostate cancer 07/07/2015    Skin cancer     nose    Stroke     RESIDUAL--\"BALANCE ISSUES\"        Past Surgical History:   Procedure Laterality Date    CARDIAC CATHETERIZATION N/A 04/10/2017    Procedure: Left Heart Cath;  Surgeon: Marjorie Healy MD;  Location: [facility] CATH INVASIVE LOCATION;  Service:     CARDIAC CATHETERIZATION N/A 04/10/2017    Procedure: Coronary angiography;  Surgeon: Marjorie Healy MD;  [facility];  Service:     CARDIAC CATHETERIZATION N/A 04/10/2017    Procedure: Left ventriculography;  Surgeon: Marjorie Healy MD;  Location: [facility] CATH INVASIVE LOCATION;  Service:     CARDIAC CATHETERIZATION  2011    CARDIAC ELECTROPHYSIOLOGY PROCEDURE N/A 04/18/2017    Procedure: Ablation atrial flutter;  Surgeon: Jose Antonio Gustafson MD;  Location: [facility] CATH INVASIVE LOCATION;  Service:     CAROTID ENDARTERECTOMY      CHOLECYSTECTOMY      CHOLECYSTECTOMY WITH INTRAOPERATIVE CHOLANGIOGRAM N/A 09/07/2019    Procedure: Laparoscopic cholecystectomy with intraoperative cholangiogram;  Surgeon: Gauri Travis MD;  Location: [facility] MAIN OR;  Service: General    " COLONOSCOPY  01/06/2015    Diverticulosis, one TA    COLONOSCOPY N/A 02/14/2019    tics, NBIH, adenomatous polyp x 2    COLONOSCOPY N/A 11/05/2021    Procedure: COLONOSCOPY TO CECUM AND TERM. ILEUM WITH COLD POLYPECTOMIES;  Surgeon: Everton Abel MD;  [facility];  Service: Gastroenterology;  Laterality: N/A;  PRE OP - PERS H/O POLYPS  POST OP - COLON POLYPS,, DIVERTICULOSIS, HEMORRHOIDS    CORONARY ARTERY BYPASS GRAFT N/A 04/11/2017    Procedure: AR STERNOTOMY CORONARY ARTERY BYPASS GRAFT TIMES 3 USING LEFT INTERNAL MAMMARY ARTERY AND LEFT GREATER SAPHENOUS VEIN GRAFT PER ENDOSCOPIC VEIN HARVESTING AND PRP ;  Surgeon: Temo Cortez MD;  Location: [facility] MAIN OR;  Service:     ENDOSCOPY  01/06/2015    HH, Ervin's esophagus    ENDOSCOPY N/A 02/14/2019    Z line irregular, HH, Ervin's esophagus    ENDOSCOPY N/A 11/05/2021    Procedure: ESOPHAGOGASTRODUODENOSCOPY WITH BIOPSIES;  Surgeon: Everton Abel MD;  [facility];  Service: Gastroenterology;  Laterality: N/A;  PRE OP - PERS H/O ERVIN'S  POST OP - IRREG Z LINE    ENDOSCOPY W/ PEG TUBE PLACEMENT N/A 11/6/2023    Procedure: ESOPHAGOGASTRODUODENOSCOPY WITH PERCUTANEOUS ENDOSCOPIC GASTROSTOMY TUBE INSERTION;  Surgeon: Temo Ramsey MD;  [facility];  Service: General;  Laterality: N/A;  Pre: dysphagia  Post: same    KNEE SURGERY Left     URETEROSCOPY LASER LITHOTRIPSY WITH STENT INSERTION Left 8/23/2022    Procedure: Cysto retrograde with left uretro stent placement;  Surgeon: Damien Oliveira MD;  Location: [facility] MAIN OR;  Service: Urology;  Laterality: Left;    VASECTOMY           Visit Dx:     ICD-10-CM ICD-9-CM   1. Oropharyngeal dysphagia  R13.12 787.22   2. Dysphagia, unspecified type  R13.10 787.20            OP SLP Assessment/Plan - 10/21/24 1547          SLP Assessment    Functional Problems Swallowing  -KA    Impact on Function: Swallowing Risk of aspiration;Risk of pneumonia  -KA     Clinical Impression: Swallowing Moderate:;oropharyngeal phase dysphagia  -KA              User Key  (r) = Recorded By, (t) = Taken By, (c) = Cosigned By      Initials Name Provider Type    Valentino Geller, SLP Speech and Language Pathologist                     SLP Adult Swallow Evaluation       Row Name 10/21/24 1500       Rehab Evaluation    Document Type evaluation  -KA    Subjective Information no complaints  -KA    Patient Observations alert;cooperative  -KA    Patient Effort good  -KA    Symptoms Noted During/After Treatment none  -KA       General Information    Patient Profile Reviewed yes  -KA    Pertinent History Of Current Problem Patient referred for VFSS due to aspiration on recent esophagram on 10/11. Patient has history of CVA, dysphagia and PEG. Most recent VFSS on 1/9/2024 recommended mechanical soft and NTL. Patient reports he has been drinking thin liquids for months and does not recall drinking NTL. He denies hx of PNA.  -KA    Current Method of Nutrition regular textures;thin liquids  -KA    Precautions/Limitations, Vision WFL  -KA    Precautions/Limitations, Hearing WFL  -KA    Prior Level of Function-Communication unknown  -KA    Prior Level of Function-Swallowing other (see comments)  see hx  -KA    Plans/Goals Discussed with patient  -KA    Barriers to Rehab none identified  -KA    Patient's Goals for Discharge patient did not state  -KA       MBS/VFSS    Utensils Used spoon;straw;cup  -KA    Consistencies Trialed soft to chew textures;chopped;mixed consistency;pureed;thin liquids;nectar/syrup-thick liquids  -KA       MBS/VFSS Interpretation    VFSS Summary Radiologist Dr Fuentes present: Patient demonstrates moderate oropharyngeal dysphagia characterized by mistiming, reduced hyolaryngeal excursion and elevation, reduced airway protection, pharyngeal constriction and BOT retraction. On initial trials of thins via cup pt demonstrated either no penetration or trace nontransient  penetration with trace amount of laryngeal vestibule residue. When liquid liquid via cup used as liquid wash following puree consistency pt demonstrated deep penetration and trace silent aspiration. End of VFSS with thins via cup pt demonstrated parvin silent aspiration with cued coughs weak and not effective. With all trials of NTL via cup (including NTL wash) pt demonstrates either no penetration or trace penetration. With puree trials difficult to determine trace penetration during the swallow due to laryngeal vestibule residue from previous penetration with thins. Pt demonstrates moderate pharyngeal residue with puree with penetration after the swallow. Pt demonstrates penetration with mechanical soft mixed during and after the swallow. Mild pharyngeal residue with thins and NTL and mild to moderate with mechanical soft consistencies. Cued double swallows and liquid wash assists with clearing residue (not all).  -KA       SLP Communication to Radiology    Summary Statement Radiologist Dr Fuentes present: Patient demonstrates moderate oropharyngeal dysphagia characterized by mistiming, reduced hyolaryngeal excursion and elevation, reduced airway protection, pharyngeal constriction and BOT retraction. On initial trials of thins via cup pt demonstrated either no penetration or trace nontransient penetration with trace amount of laryngeal vestibule residue. When liquid liquid via cup used as liquid wash following puree consistency pt demonstrated deep penetration and trace silent aspiration. End of VFSS with thins via cup pt demonstrated parvin silent aspiration with cued coughs weak and not effective. With all trials of NTL via cup (including NTL wash) pt demonstrates either no penetration or trace penetration. With puree trials difficult to determine trace penetration during the swallow due to laryngeal vestibule residue from previous penetration with thins. Pt demonstrates moderate pharyngeal residue with puree  with penetration after the swallow. Pt demonstrates penetration with mechanical soft mixed during and after the swallow. Mild pharyngeal residue with thins and NTL and mild to moderate with mechanical soft consistencies. Cued double swallows and liquid wash assists with clearing residue (not all).  -KA       SLP Evaluation Clinical Impression    SLP Swallowing Diagnosis moderate;oral dysphagia;pharyngeal dysphagia  -KA    Functional Impact risk of aspiration/pneumonia  -KA    Rehab Potential/Prognosis, Swallowing good, to achieve stated therapy goals  -KA    Swallow Criteria for Skilled Therapeutic Interventions Met demonstrates skilled criteria  -KA       Recommendations    Therapy Frequency (Swallow) PRN  -KA    Predicted Duration Therapy Intervention (Days) until discharge  -KA    SLP Diet Recommendation soft to chew textures;chopped;no mixed consistencies;nectar thick liquids;ice chips between meals after oral care, with supervision;water between meals after oral care, with supervision  -KA    Recommended Precautions and Strategies upright posture during/after eating;small bites of food and sips of liquid;multiple swallows per bite of food;multiple swallows per sip of liquid;alternate between small bites of food and sips of liquid;volitional throat clear  -KA    Oral Care Recommendations Oral Care BID/PRN  -KA    SLP Rec. for Method of Medication Administration meds whole;with thick liquids;with puree  -KA    Monitor for Signs of Aspiration yes;notify SLP if any concerns  -KA    Anticipated Discharge Disposition (SLP) home with OP services  -KA              User Key  (r) = Recorded By, (t) = Taken By, (c) = Cosigned By      Initials Name Provider Type    Valentino Geller SLP Speech and Language Pathologist                                   OP SLP Education       Row Name 10/21/24 4632       Education    Barriers to Learning No barriers identified  -KA    Education Provided Described results of  evaluation;Patient expressed understanding of evaluation;Family/caregivers expressed understanding of evaluation  -JESUS ALBERTO    Assessed Learning needs;Learning motivation  -KA    Learning Motivation Moderate  -KA    Learning Method Explanation;Written materials  -JESUS ALBERTO    Teaching Response Verbalized understanding  -JESUS ALBERTO    Education Comments SLP reviewed results of VFSS with pt and wife. Pointed out aspiration of thin liquids and risks for aspiration PNA. Discussed diet recommendations of NTL and mechanical soft no mixed and safe swallow precautions with handout provided. Recommend OP ST, pt appears agreeable.  -JESUS ALBERTO              User Key  (r) = Recorded By, (t) = Taken By, (c) = Cosigned By      Initials Name Effective Dates    Valentino Geller SLP 01/05/24 -                                Time Calculation:   SLP Start Time: 1315  Untimed Charges  01797-IU Motion Fluoro Eval Swallow Minutes: 60  Total Minutes  Untimed Charges Total Minutes: 60   Total Minutes: 60    Therapy Charges for Today       Code Description Service Date Service Provider Modifiers Qty    [id number] HC ST MOTION FLUORO EVAL SWALLOW 4 10/21/2024 Valentino Peralta SLP GN 1                     GONZÁLEZ Mcihaud  10/21/2024

## 2024-10-22 DIAGNOSIS — R13.12 OROPHARYNGEAL DYSPHAGIA: Primary | ICD-10-CM

## 2024-10-22 NOTE — PROGRESS NOTES
Patient should already know but the speech therapist at [facility] recommend ongoing outpatient therapy with them.  I have placed the order for this to be completed.

## 2024-10-23 ENCOUNTER — TELEPHONE (OUTPATIENT)
Dept: GASTROENTEROLOGY | Facility: CLINIC | Age: 79
End: 2024-10-23
Payer: MEDICARE

## 2024-10-23 NOTE — TELEPHONE ENCOUNTER
----- Message from Marlene Cordova sent at 10/22/2024  8:08 AM EDT -----  Patient should already know but the speech therapist at [facility] recommend ongoing outpatient therapy with them.  I have placed the order for this to be completed.

## 2024-10-31 ENCOUNTER — OFFICE VISIT (OUTPATIENT)
Dept: PAIN MEDICINE | Facility: CLINIC | Age: 79
End: 2024-10-31
Payer: MEDICARE

## 2024-10-31 VITALS
TEMPERATURE: 97.3 F | OXYGEN SATURATION: 99 % | HEIGHT: 68 IN | SYSTOLIC BLOOD PRESSURE: 164 MMHG | BODY MASS INDEX: 22.85 KG/M2 | WEIGHT: 150.8 LBS | DIASTOLIC BLOOD PRESSURE: 78 MMHG | HEART RATE: 84 BPM | RESPIRATION RATE: 18 BRPM

## 2024-10-31 DIAGNOSIS — M51.370 DEGENERATION OF INTERVERTEBRAL DISC OF LUMBOSACRAL REGION WITH DISCOGENIC BACK PAIN: ICD-10-CM

## 2024-10-31 DIAGNOSIS — M54.50 CHRONIC BILATERAL LOW BACK PAIN WITHOUT SCIATICA: Primary | ICD-10-CM

## 2024-10-31 DIAGNOSIS — M51.379 DDD (DEGENERATIVE DISC DISEASE), LUMBOSACRAL: ICD-10-CM

## 2024-10-31 DIAGNOSIS — M25.50 ARTHRALGIA OF MULTIPLE JOINTS: ICD-10-CM

## 2024-10-31 DIAGNOSIS — Z79.899 ENCOUNTER FOR LONG-TERM (CURRENT) USE OF HIGH-RISK MEDICATION: ICD-10-CM

## 2024-10-31 DIAGNOSIS — M25.552 CHRONIC PAIN OF BOTH HIPS: ICD-10-CM

## 2024-10-31 DIAGNOSIS — G89.29 CHRONIC BILATERAL LOW BACK PAIN WITHOUT SCIATICA: Primary | ICD-10-CM

## 2024-10-31 DIAGNOSIS — M47.816 LUMBAR FACET ARTHROPATHY: ICD-10-CM

## 2024-10-31 DIAGNOSIS — G89.29 CHRONIC PAIN OF BOTH HIPS: ICD-10-CM

## 2024-10-31 DIAGNOSIS — M25.551 CHRONIC PAIN OF BOTH HIPS: ICD-10-CM

## 2024-10-31 LAB
POC AMPHETAMINES: NEGATIVE
POC BARBITURATES: NEGATIVE
POC BENZODIAZEPHINES: NEGATIVE
POC COCAINE: NEGATIVE
POC METHADONE: NEGATIVE
POC METHAMPHETAMINE SCREEN URINE: NEGATIVE
POC OPIATES: NEGATIVE
POC OXYCODONE: NEGATIVE
POC PHENCYCLIDINE: NEGATIVE
POC PROPOXYPHENE: NEGATIVE
POC THC: NEGATIVE
POC TRICYCLIC ANTIDEPRESSANTS: NEGATIVE

## 2024-10-31 RX ORDER — HYDROCODONE BITARTRATE AND ACETAMINOPHEN 5; 325 MG/1; MG/1
1 TABLET ORAL 3 TIMES DAILY PRN
Qty: 90 TABLET | Refills: 0 | Status: SHIPPED | OUTPATIENT
Start: 2024-10-31

## 2024-10-31 RX ORDER — CARBIDOPA AND LEVODOPA 25; 100 MG/1; MG/1
1 TABLET ORAL 3 TIMES DAILY
COMMUNITY
Start: 2024-10-23

## 2024-10-31 NOTE — PROGRESS NOTES
"CHIEF COMPLAINT  Back and joint pain     Subjective   Madhu Santoro is a 78 y.o. male  who presents for follow-up.  He has a history of chronic back and joint pain. He reports that his pain .    Today pain is 9/10VAS in severity (no acute distress noted at this time). Pain is located in his low back and radiates down right lateral leg. He also has pain throughout multiple joints (hands, feet, knees) and notes numbness in bilateral feet. Describes this pain as a nearly continuous aching, burning, and throbbing. Pain is worsened by prolonged walking or standing, increased physical activity, and bending/twisitng. Pain improves with rest/reposition, use of a heating pad, and medication.     He was going to PT twice a week but stopped going after a fall in August. He was seen in the ED with an acute head injury and forehead laceration. No acute findings on CT scan. Laceration was stitched. He hopes to restart PT soon.      Continues with Hydrocodone 5mg 2-3/day (last dose of medication this morning) and OTC Tylenol PRN. Denies any side side effects from the regimen including somnolence and constipation. The regimen helps \"take the edge off\". Notes improvement in activity and function with regimen. ADL's by self. Denies any bowel or bladder changes. He takes Elavil 50mg at night for peripheral neuropathy (Dr. Russell), Allopurinol for gout (Dr. Sanchez), and Sinemet (MY Geller).      Not a candidate for NSAIDs - history of TIA's and remains on Eliquis (prescribed by Dr. Sanchez)     Unable to tolerate Pregabalin - caused BLE swelling    Back Pain  This is a chronic problem. The current episode started more than 1 year ago. The problem occurs constantly. The problem is unchanged. The pain is present in the lumbar spine. The quality of the pain is described as aching (throbbing). Radiates to: to bilateral legs L > R  The pain is at a severity of 9/10. The symptoms are aggravated by standing, sitting, twisting, " bending, lying down and position (walking long distances, weather changes). Stiffness is present All day. Associated symptoms include numbness (bilateral legs and feet) and weakness. Pertinent negatives include no abdominal pain, chest pain, dysuria, fever or headaches. He has tried heat and analgesics (rest/reposition, PT in the past (this caused increased pain)) for the symptoms. The treatment provided mild relief.   Joint Pain  This is a chronic (bilateral hands, left shoulder, bilateral knees) problem. The current episode started more than 1 year ago. The problem occurs daily. The problem has been waxing and waning. Associated symptoms include arthralgias, numbness (bilateral legs and feet) and weakness. Pertinent negatives include no abdominal pain, chest pain, chills, congestion, coughing, fatigue, fever, headaches or joint swelling. The symptoms are aggravated by walking, standing and bending (weather changes, increased physical activity, ). He has tried oral narcotics, position changes, rest, heat, lying down and acetaminophen for the symptoms. The treatment provided moderate relief.      PEG Assessment   What number best describes your pain on average in the past week?7  What number best describes how, during the past week, pain has interfered with your enjoyment of life?9  What number best describes how, during the past week, pain has interfered with your general activity?  9    Review of Pertinent Medical Data ---      The following portions of the patient's history were reviewed and updated as appropriate: allergies, current medications, past family history, past medical history, past social history, past surgical history, and problem list.    Review of Systems   Constitutional:  Negative for chills, fatigue and fever.   HENT:  Negative for congestion.    Respiratory:  Negative for cough.    Cardiovascular:  Negative for chest pain.   Gastrointestinal:  Negative for abdominal pain, constipation and  "diarrhea.   Genitourinary:  Negative for difficulty urinating and dysuria.   Musculoskeletal:  Positive for arthralgias and back pain. Negative for joint swelling.   Neurological:  Positive for weakness and numbness (bilateral legs and feet). Negative for headaches.   Psychiatric/Behavioral:  Negative for sleep disturbance and suicidal ideas. The patient is not nervous/anxious.      I have reviewed and confirmed the accuracy of the ROS as documented by the MA/LPN/RN MY Perez    Vitals:    10/31/24 1243   BP: 164/78   Pulse: 84   Resp: 18   Temp: 97.3 °F (36.3 °C)   SpO2: 99%   Weight: 68.4 kg (150 lb 12.8 oz)   Height: 172.7 cm (68\")   PainSc:   9   PainLoc: Back     Objective   Physical Exam  Constitutional:       Appearance: Normal appearance.   HENT:      Head: Normocephalic.   Cardiovascular:      Rate and Rhythm: Normal rate and regular rhythm.   Pulmonary:      Effort: Pulmonary effort is normal.      Breath sounds: Decreased air movement present.   Musculoskeletal:      Cervical back: Normal range of motion.      Lumbar back: Tenderness present. Decreased range of motion. Positive right straight leg raise test and positive left straight leg raise test.      Comments: Diffuse arthritic changes   Skin:     General: Skin is warm and dry.      Capillary Refill: Capillary refill takes less than 2 seconds.   Neurological:      General: No focal deficit present.      Mental Status: He is alert and oriented to person, place, and time.      Gait: Gait abnormal (slowed, ambulates with walker).   Psychiatric:         Mood and Affect: Mood normal.         Behavior: Behavior normal.         Thought Content: Thought content normal.         Cognition and Memory: Cognition normal.       Assessment & Plan   Diagnoses and all orders for this visit:    1. Chronic bilateral low back pain without sciatica (Primary)    2. Degeneration of intervertebral disc of lumbosacral region with discogenic back pain    3. " Chronic pain of both hips  -     HYDROcodone-acetaminophen (NORCO) 5-325 MG per tablet; Take 1 tablet by mouth 3 (Three) Times a Day As Needed for Severe Pain or Moderate Pain. 30 day supply. DNF 11/6/24  Dispense: 90 tablet; Refill: 0    4. Arthralgia of multiple joints  -     HYDROcodone-acetaminophen (NORCO) 5-325 MG per tablet; Take 1 tablet by mouth 3 (Three) Times a Day As Needed for Severe Pain or Moderate Pain. 30 day supply. DNF 11/6/24  Dispense: 90 tablet; Refill: 0    5. Lumbar facet arthropathy    6. Encounter for long-term (current) use of high-risk medication    7. DDD (degenerative disc disease), lumbosacral  -     HYDROcodone-acetaminophen (NORCO) 5-325 MG per tablet; Take 1 tablet by mouth 3 (Three) Times a Day As Needed for Severe Pain or Moderate Pain. 30 day supply. DNF 11/6/24  Dispense: 90 tablet; Refill: 0      Madhu Santoro reports a pain score of 9.  Given his pain assessment as noted, treatment options were discussed and the following options were decided upon as a follow-up plan to address the patient's pain: continuation of current treatment plan for pain and prescription for opiod analgesics.    --- Routine UDS in office today as part of monitoring requirements for controlled substances.  The specimen was viewed and the immunoassay result reviewed and is NEG.  This specimen will be sent to [email or web address] laboratory for confirmation.     --- The patient signed an updated copy of the controlled substance agreement on 6/24/24   --- ORT performed on 6/24/24 -- low risk  --- Continue Hydrocodone. DNF 11/6/24. Patient appears stable with current regimen. No adverse effects. Regarding continuation of opioids, there is no evidence of aberrant behavior or any red flags.  The patient continues with appropriate response to opioid therapy. ADL's remain intact by self.   --- Patient wife asks about epidurals to see if this would be beneficial to his pain. Reviewed MRI findings and discussed that  patient may be a patient for an epidural (possibly Right L4 & L5 TF LES). Patient wishes to think about this and will contact our office if he wishes to move forward.  I would need to obtain clearance from Dr. Sanchez to hold his Eliquis for 3 days prior to this procedure.  --- Follow-up 2 months or sooner if needed    Pain / Disability Scale  The scale used for measurement of pain and/or disability for this patient was the [place] back pain disability scale.  The score was 83 on 10/31/2024       RUTH REPORT  As part of the patient's treatment plan, I am prescribing controlled substances. The patient has been made aware of appropriate use of such medications, including potential risk of somnolence, limited ability to drive and/or work safely, and the potential for dependence or overdose. It has also been made clear that these medications are for use by this patient only, without concomitant use of alcohol or other substances unless prescribed.     Patient has completed prescribing agreement detailing terms of continued prescribing of controlled substances, including monitoring RUTH reports, urine drug screening, and pill counts if necessary. The patient is aware that inappropriate use will results in cessation of prescribing such medications.    As the clinician, I personally reviewed the RUTH from 10/31/24 while the patient was in the office today.    History and physical exam exhibit continued safe and appropriate use of controlled substances.    Dictated utilizing Dragon dictation.

## 2024-11-09 DIAGNOSIS — M10.9 GOUT, UNSPECIFIED CAUSE, UNSPECIFIED CHRONICITY, UNSPECIFIED SITE: Chronic | ICD-10-CM

## 2024-11-10 RX ORDER — ALLOPURINOL 300 MG/1
300 TABLET ORAL DAILY
Qty: 90 TABLET | Refills: 0 | Status: SHIPPED | OUTPATIENT
Start: 2024-11-10

## 2024-11-14 ENCOUNTER — OFFICE VISIT (OUTPATIENT)
Dept: FAMILY MEDICINE CLINIC | Facility: CLINIC | Age: 79
End: 2024-11-14
Payer: MEDICARE

## 2024-11-14 VITALS
SYSTOLIC BLOOD PRESSURE: 136 MMHG | HEART RATE: 82 BPM | DIASTOLIC BLOOD PRESSURE: 58 MMHG | RESPIRATION RATE: 16 BRPM | WEIGHT: 151.4 LBS | HEIGHT: 68 IN | BODY MASS INDEX: 22.94 KG/M2 | OXYGEN SATURATION: 99 % | TEMPERATURE: 97.5 F

## 2024-11-14 DIAGNOSIS — N41.0 ACUTE PROSTATITIS: Primary | ICD-10-CM

## 2024-11-14 PROCEDURE — 1125F AMNT PAIN NOTED PAIN PRSNT: CPT | Performed by: FAMILY MEDICINE

## 2024-11-14 PROCEDURE — 3075F SYST BP GE 130 - 139MM HG: CPT | Performed by: FAMILY MEDICINE

## 2024-11-14 PROCEDURE — 99213 OFFICE O/P EST LOW 20 MIN: CPT | Performed by: FAMILY MEDICINE

## 2024-11-14 PROCEDURE — 1160F RVW MEDS BY RX/DR IN RCRD: CPT | Performed by: FAMILY MEDICINE

## 2024-11-14 PROCEDURE — 1159F MED LIST DOCD IN RCRD: CPT | Performed by: FAMILY MEDICINE

## 2024-11-14 PROCEDURE — 3078F DIAST BP <80 MM HG: CPT | Performed by: FAMILY MEDICINE

## 2024-11-14 RX ORDER — CIPROFLOXACIN 250 MG/1
250 TABLET, FILM COATED ORAL
Qty: 20 TABLET | Refills: 1 | Status: SHIPPED | OUTPATIENT
Start: 2024-11-14

## 2024-11-14 NOTE — PROGRESS NOTES
"Subjective   Madhu Santoro is a 78 y.o. male.     CC: Dysuria/Back Pain    History of Present Illness     Patient comes in today reporting some burning with urination and some low back ache for the past few weeks.  Denies fever/chills.      The following portions of the patient's history were reviewed and updated as appropriate: allergies, current medications, past family history, past medical history, past social history, past surgical history, and problem list.    Review of Systems   Constitutional:  Negative for activity change, chills and fever.   Respiratory:  Negative for cough.    Cardiovascular:  Negative for chest pain.   Genitourinary:  Positive for dysuria.   Musculoskeletal:  Positive for back pain.   Psychiatric/Behavioral:  Negative for dysphoric mood.        /58   Pulse 82   Temp 97.5 °F (36.4 °C) (Oral)   Resp 16   Ht 172.7 cm (68\")   Wt 68.7 kg (151 lb 6.4 oz)   SpO2 99%   BMI 23.02 kg/m²     Objective   Physical Exam  Vitals and nursing note reviewed.   Constitutional:       General: He is not in acute distress.     Appearance: He is well-developed.   Pulmonary:      Effort: Pulmonary effort is normal.   Neurological:      Mental Status: He is alert and oriented to person, place, and time.   Psychiatric:         Behavior: Behavior normal.         Thought Content: Thought content normal.         Assessment & Plan   Diagnoses and all orders for this visit:    1. Acute prostatitis (Primary)  -     ciprofloxacin (Cipro) 250 MG tablet; Take 1 tablet by mouth 2 (Two) Times a Day.  Dispense: 20 tablet; Refill: 1                 "

## 2024-11-18 ENCOUNTER — TELEPHONE (OUTPATIENT)
Dept: NEUROLOGY | Facility: CLINIC | Age: 79
End: 2024-11-18
Payer: MEDICARE

## 2024-11-18 DIAGNOSIS — G60.9 PERIPHERAL NEUROPATHY, IDIOPATHIC: ICD-10-CM

## 2024-11-18 RX ORDER — AMITRIPTYLINE HYDROCHLORIDE 50 MG/1
50 TABLET ORAL NIGHTLY
Qty: 30 TABLET | Refills: 2 | Status: SHIPPED | OUTPATIENT
Start: 2024-11-18

## 2024-11-18 NOTE — TELEPHONE ENCOUNTER
Caller: ALLEN Roy call back number:   Telephone Information:   Mobile [phone]         Which medication are you concerned about: amitriptyline (ELAVIL) 50 MG tablet     What are your concerns: SHE STATES THEY HAVE DECIDED TO CHANGE PHARMACIES, SHE STATES THE FOLLOWING PHARMACY HAS BEEN REQUEST THE MEDICATION TO BE TRANSFERRED AND THEY KEEP GOING BACK AND FORTH WITH IF IT WAS SENT OR NOT. SHE IS REQUESTING THE PROVIDER JUST REROUTE THE MEDICATION AS PT HAS BEEN COMPLETELY OUT FOR THE PAST MONTH.     [facility] - [place], [place] - [address] [facility] - [phone]  - [phone] FX

## 2024-11-25 DIAGNOSIS — G60.9 PERIPHERAL NEUROPATHY, IDIOPATHIC: ICD-10-CM

## 2024-11-25 RX ORDER — GABAPENTIN 100 MG/1
100 CAPSULE ORAL 2 TIMES DAILY
Qty: 60 CAPSULE | Refills: 0 | Status: SHIPPED | OUTPATIENT
Start: 2024-11-25

## 2024-11-25 NOTE — TELEPHONE ENCOUNTER
Caller: Brooke Santoro ([facility])    Relationship: Emergency Contact    Best call back number: [phone]    Requested Prescriptions:   Requested Prescriptions     Pending Prescriptions Disp Refills    gabapentin (NEURONTIN) 100 MG capsule 60 capsule 0     Sig: Take 1 capsule by mouth 2 (Two) Times a Day.        Pharmacy where request should be sent: [facility] - [place], [place] - [address] - [phone]  - [phone] FX     Last office visit with prescribing clinician: 9/26/2024   Last telemedicine visit with prescribing clinician: Visit date not found   Next office visit with prescribing clinician: 12/19/2024     Additional details provided by patient: PT HAS BEEN OUT OF THIS MEDICATION FOR FOUR DAYS    Does the patient have less than a 3 day supply:  [x] Yes  [] No    Would you like a call back once the refill request has been completed: [] Yes [x] No    If the office needs to give you a call back, can they leave a voicemail: [x] Yes [] No    Daniela Dale Rep   11/25/24 10:04 EST

## 2024-12-19 ENCOUNTER — OFFICE VISIT (OUTPATIENT)
Dept: NEUROLOGY | Facility: CLINIC | Age: 79
End: 2024-12-19
Payer: MEDICARE

## 2024-12-19 VITALS
HEIGHT: 68 IN | WEIGHT: 146 LBS | OXYGEN SATURATION: 97 % | DIASTOLIC BLOOD PRESSURE: 60 MMHG | RESPIRATION RATE: 16 BRPM | HEART RATE: 78 BPM | BODY MASS INDEX: 22.13 KG/M2 | SYSTOLIC BLOOD PRESSURE: 142 MMHG

## 2024-12-19 DIAGNOSIS — R29.898 BILATERAL LEG WEAKNESS: ICD-10-CM

## 2024-12-19 DIAGNOSIS — G20.A1 PARKINSON'S DISEASE WITHOUT DYSKINESIA OR FLUCTUATING MANIFESTATIONS: Primary | ICD-10-CM

## 2024-12-19 DIAGNOSIS — G60.9 PERIPHERAL NEUROPATHY, IDIOPATHIC: ICD-10-CM

## 2024-12-19 RX ORDER — GABAPENTIN 100 MG/1
200 CAPSULE ORAL 2 TIMES DAILY
Qty: 120 CAPSULE | Refills: 0 | Status: SHIPPED | OUTPATIENT
Start: 2024-12-19

## 2024-12-19 RX ORDER — CARBIDOPA/LEVODOPA 25MG-250MG
1 TABLET ORAL 3 TIMES DAILY
Qty: 90 TABLET | Refills: 2 | Status: SHIPPED | OUTPATIENT
Start: 2024-12-19

## 2024-12-19 NOTE — PATIENT INSTRUCTIONS
Take 1/2 tablet of amitriptyline nightly for two weeks, then take it every other night for two weeks then stop the medication.

## 2024-12-19 NOTE — PROGRESS NOTES
DOS: 2024  NAME: Madhu Santoro   : 1945  PCP: Damian Sanchez MD    Chief Complaint   Patient presents with    Parkinson's disease without dyskinesia or fluctuating manif      SUBJECTIVE  Neurological Problem:  79 y.o. right-handed male with a past medical history of HTN, HLD, CAD status post CABG and stent, A-flutter (Eliquis), PAD, orthostatic hypotension, GERD, Mandel's esophagus, history of colonic polyps, history of kidney stones, iron deficiency anemia, polycythemia vera, anxiety, chronic bilateral low back pain, gout, osteoarthritis, history of strokes, peripheral neuropathy, former smoker, history of COVID-19 with long COVID syndrome . They are seen in follow up today for Parkinson's disease, peripheral neuropathy. A summary of the history was taken from the previous note with additions/modifications as indicated. He is accompanied by his spouse.    Interval History:   **For detailed interval history see progress note dated 2024.      Mr. Santoro follows with our clinic for peripheral neuropathy, is a patient of Dr. Shaffers. He had an EMG conducted in 2023 with Dr. Zaragoza which showed a moderate to severe peripheral neuropathy. He was prescribed amitriptyline nightly which was increased to 50 mg and he found that helpful for the neuropathy in his feet. Peripheral neuropathy reversible cause labs were checked and showed abnormal immunofixation serum with elevated IgA and IgM levels with lambda specificity.  There was concern that he was exhibiting parkinsonian features and he was initiated on carbidopa-levodopa  mg 1 tablet 3 times daily. At his last visit it was increased to  mg 1 tablet TID. He was also started on gabapentin 100 mg BID for a right foot shooting nerve pain.    He presents today in follow-up, reports that he didn't get the increased dose of Sinemet from his pharmacy; he switched pharmacies and still could not get it. He has been taking the  mg 1  tablet TID.  He tells me that he recently fell twice, did not hit his head or incur any injury.  He does have a rigidity predominant Parkinson's disease.  He easily becomes stuck or frozen especially when trying to ambulate.  He has decreased voice volume and masked facies.  He denies any hallucinations, no excessive drooling.  He has been taking the gabapentin 100 mg twice daily and it is helping somewhat with the shooting pain but he does still have some breakthrough pain.  He also tells me that he is having some swallowing trouble but is following with GI.  He is supposed to be utilizing a thickener and his diet has been modified but he does not follow these recommendations.    Review of Systems   Musculoskeletal:  Positive for gait problem.   Neurological:  Positive for weakness and numbness. Negative for dizziness, tremors, seizures, syncope, facial asymmetry, speech difficulty, light-headedness and headaches.   Psychiatric/Behavioral:  Negative for agitation, behavioral problems, confusion, decreased concentration, dysphoric mood, hallucinations, self-injury, sleep disturbance and suicidal ideas. The patient is not nervous/anxious and is not hyperactive.         The following portions of the patient's history were reviewed and updated as appropriate: allergies, current medications, past family history, past medical history, past social history, past surgical history and problem list.    Current Medications:   Current Outpatient Medications:     allopurinol (ZYLOPRIM) 300 MG tablet, TAKE 1 TABLET BY MOUTH DAILY, Disp: 90 tablet, Rfl: 0    apixaban (ELIQUIS) 2.5 MG tablet tablet, Take 1 tablet by mouth Every 12 (Twelve) Hours. Indications: Atrial Fibrillation, Disp: , Rfl:     gabapentin (NEURONTIN) 100 MG capsule, Take 2 capsules by mouth 2 (Two) Times a Day., Disp: 120 capsule, Rfl: 0    HYDROcodone-acetaminophen (NORCO) 5-325 MG per tablet, Take 1 tablet by mouth 3 (Three) Times a Day As Needed for Severe  "Pain or Moderate Pain. 30 day supply. DNF 11/6/24, Disp: 90 tablet, Rfl: 0    lisinopril (PRINIVIL,ZESTRIL) 5 MG tablet, Take 1 tablet by mouth Daily., Disp: 30 tablet, Rfl: 11    Nutritional Supplements (OSMOLITE 1.5 GIANNI PO), 240 Units/oz/day by Gastrostomy Tube route 6 (Six) Times a Day., Disp: , Rfl:     RABEprazole (ACIPHEX) 20 MG EC tablet, TAKE 1 TABLET BY MOUTH DAILY, Disp: 90 tablet, Rfl: 0    rosuvastatin (Crestor) 20 MG tablet, Take 1 tablet by mouth Every Night. Indications: Cerebrovascular Accident or Stroke, Disp: 90 tablet, Rfl: 1    carbidopa-levodopa (Sinemet)  MG per tablet, Take 1 tablet by mouth 3 (Three) Times a Day., Disp: 90 tablet, Rfl: 2    cephalexin (Keflex) 500 MG capsule, Take 1 capsule by mouth 4 (Four) Times a Day. (Patient not taking: Reported on 12/19/2024), Disp: 30 capsule, Rfl: 0    ciprofloxacin (Cipro) 250 MG tablet, Take 1 tablet by mouth 2 (Two) Times a Day. (Patient not taking: Reported on 12/19/2024), Disp: 20 tablet, Rfl: 1    Current Facility-Administered Medications:     cyanocobalamin injection 1,000 mcg, 1,000 mcg, Intramuscular, Q30 Days, Damian Sanchez MD, 1,000 mcg at 04/06/23 0957  **I did not stop or change the above medications.  Patient's medication list was updated to reflect medications they have reported as currently taking, including medication changes made by other providers.    Objective   Vital Signs:  /60   Pulse 78   Resp 16   Ht 172.7 cm (67.99\")   Wt 66.2 kg (146 lb)   SpO2 97%   BMI 22.20 kg/m²   Body mass index is 22.2 kg/m².    Physical Exam   Physical Exam:  GENERAL: NAD, alert  HEENT: Normocephalic, atraumatic   Resp: Even and unlabored    Neurological:   MS: AOx3, recent/remote memory intact, normal attention/concentration, language intact, no neglect  CN: visual acuity grossly normal, PERRL, EOMI, no nystagmus, no facial droop, no dysarthria, decreased voice volume  Motor: 5/5 strength RUE/LUE. 4-/5 RLE/LLE. Bradykinesia in " "lower extremities, cogwheeling of bilateral upper extremities.  No tremor or abnormal movements noted. No asterixis. Masked facies.   Sensory: Intact to crude touch in all four ext.  Coordination: No dysmetria finger to nose   Rapid alternating movements: Slowed finger to thumb tap  Gait and station: Stooped posture, minimal arm swing. Using forward rolling walker today.     Result Review :  The following data was reviewed by: MY Geller on 12/19/2024:  Laboratory Results:         Lab Results   Component Value Date    WBC 27.43 (H) 10/16/2024    HGB 13.6 10/16/2024    HCT 48.1 10/16/2024    MCV 72.7 (L) 10/16/2024     (H) 10/16/2024     Lab Results   Component Value Date    GLUCOSE 70 05/14/2024    BUN 18 05/14/2024    CREATININE 0.88 05/14/2024    EGFRIFNONA 65 02/08/2022    EGFRIFAFRI 75 02/08/2022    BCR 20.5 05/14/2024    K 5.8 (H) 05/14/2024    CO2 30.9 (H) 05/14/2024    CALCIUM 10.0 05/14/2024    PROTENTOTREF 7.0 05/14/2024    ALBUMIN 4.3 05/14/2024    LABIL2 1.6 05/14/2024    AST 19 05/14/2024    ALT 10 05/14/2024     Lab Results   Component Value Date    HGBA1C 5.50 11/04/2023     Lab Results   Component Value Date    CHOL 106 04/12/2021    CHOL 138 04/10/2017     Lab Results   Component Value Date    HDL 38 (L) 07/03/2023    HDL 38 (L) 01/06/2023    HDL 42 08/10/2021     Lab Results   Component Value Date    LDL 58 07/03/2023    LDL 63 01/06/2023    LDL 68 08/10/2021     Lab Results   Component Value Date    TRIG 83 07/03/2023    TRIG 96 01/06/2023    TRIG 66 08/11/2022     No results found for: \"RPR\"  Lab Results   Component Value Date    TSH 2.130 01/10/2024     Lab Results   Component Value Date    [id number] 752 01/10/2024                  Assessment and Plan   Diagnoses and all orders for this visit:    1. Parkinson's disease without dyskinesia or fluctuating manifestations (Primary)  -     carbidopa-levodopa (Sinemet)  MG per tablet; Take 1 tablet by mouth 3 (Three) Times a " Day.  Dispense: 90 tablet; Refill: 2  -     Ambulatory Referral to Physical Therapy for Evaluation & Treatment    2. Peripheral neuropathy, idiopathic  -     gabapentin (NEURONTIN) 100 MG capsule; Take 2 capsules by mouth 2 (Two) Times a Day.  Dispense: 120 capsule; Refill: 0    3. Bilateral leg weakness      I sent in again carbidopa-levodopa  mg 1 tablet 3 times daily.  This has been sent to a new pharmacy on file per patient request.  We have also placed orders for physical therapy for weakness, balance and gait.  We will try to increase the gabapentin to 200 mg twice daily for the sharp shooting pain of his foot.  We will begin to wean off of amitriptyline, does not feel it is working well.    We will see Madhu back in 3 months with Dr. Russell, sooner if symptoms warrant.     MY Geller  [facility]   12/19/24       I spent 30 minutes caring for Madhu on this date of service. This time includes time spent by me in the following activities:preparing for the visit, reviewing tests, obtaining and/or reviewing a separately obtained history, performing a medically appropriate examination and/or evaluation , counseling and educating the patient/family/caregiver, ordering medications, tests, or procedures, referring and communicating with other health care professionals , documenting information in the medical record, independently interpreting results and communicating that information with the patient/family/caregiver, and care coordination  Follow Up   Return in about 3 months (around 3/19/2025).  Patient was given instructions and counseling regarding his condition or for health maintenance advice. Please see specific information pulled into the AVS if appropriate.       Dictated using Dragon Dictation

## 2024-12-20 ENCOUNTER — OFFICE VISIT (OUTPATIENT)
Dept: PAIN MEDICINE | Facility: CLINIC | Age: 79
End: 2024-12-20
Payer: MEDICARE

## 2024-12-20 VITALS
WEIGHT: 148.8 LBS | HEIGHT: 68 IN | BODY MASS INDEX: 22.55 KG/M2 | RESPIRATION RATE: 18 BRPM | SYSTOLIC BLOOD PRESSURE: 157 MMHG | OXYGEN SATURATION: 100 % | HEART RATE: 72 BPM | DIASTOLIC BLOOD PRESSURE: 65 MMHG

## 2024-12-20 DIAGNOSIS — M51.370 DEGENERATION OF INTERVERTEBRAL DISC OF LUMBOSACRAL REGION WITH DISCOGENIC BACK PAIN: ICD-10-CM

## 2024-12-20 DIAGNOSIS — G89.29 CHRONIC PAIN OF BOTH HIPS: ICD-10-CM

## 2024-12-20 DIAGNOSIS — G89.29 CHRONIC BILATERAL LOW BACK PAIN WITHOUT SCIATICA: Primary | ICD-10-CM

## 2024-12-20 DIAGNOSIS — M25.50 ARTHRALGIA OF MULTIPLE JOINTS: ICD-10-CM

## 2024-12-20 DIAGNOSIS — M47.816 LUMBAR FACET ARTHROPATHY: ICD-10-CM

## 2024-12-20 DIAGNOSIS — M25.552 CHRONIC PAIN OF BOTH HIPS: ICD-10-CM

## 2024-12-20 DIAGNOSIS — M25.551 CHRONIC PAIN OF BOTH HIPS: ICD-10-CM

## 2024-12-20 DIAGNOSIS — M54.50 CHRONIC BILATERAL LOW BACK PAIN WITHOUT SCIATICA: Primary | ICD-10-CM

## 2024-12-20 DIAGNOSIS — Z79.899 ENCOUNTER FOR LONG-TERM (CURRENT) USE OF HIGH-RISK MEDICATION: ICD-10-CM

## 2024-12-20 DIAGNOSIS — M51.379 DDD (DEGENERATIVE DISC DISEASE), LUMBOSACRAL: ICD-10-CM

## 2024-12-20 DIAGNOSIS — R26.89 BALANCE PROBLEM: ICD-10-CM

## 2024-12-20 DIAGNOSIS — G20.A1 PARKINSON'S DISEASE, UNSPECIFIED WHETHER DYSKINESIA PRESENT, UNSPECIFIED WHETHER MANIFESTATIONS FLUCTUATE: ICD-10-CM

## 2024-12-20 DIAGNOSIS — R29.898 WEAKNESS OF BOTH LOWER EXTREMITIES: ICD-10-CM

## 2024-12-20 RX ORDER — HYDROCODONE BITARTRATE AND ACETAMINOPHEN 5; 325 MG/1; MG/1
1 TABLET ORAL 3 TIMES DAILY PRN
Qty: 90 TABLET | Refills: 0 | Status: SHIPPED | OUTPATIENT
Start: 2024-12-20

## 2024-12-20 NOTE — PROGRESS NOTES
"CHIEF COMPLAINT  Back and joint pain    Subjective   Madhu Santoro is a 79 y.o. male  who presents for follow-up.  He has a history of chronic back and joint pain. He reports that his pain has remained consistent since his last office and varies based on activity level.    Today pain is 8/10VAS in severity (no acute distress noted at this time). Pain is located in his low back and radiates down right lateral leg. He also has pain throughout multiple joints (hands, feet, knees) and notes numbness in bilateral feet. Describes this pain as a nearly continuous aching, burning, and throbbing. He states BLE feel \"heavy\". He has fallen twice since her was here last. Pain is worsened by prolonged walking or standing, increased physical activity, and bending/twisitng. Pain improves with rest/reposition, use of a heating pad, and medication. He has completed PT in the past which was helpful with strength training and ROM. He hopes to start again in the new year.     Continues with Hydrocodone 5mg 2-3/day (last dose of medication this morning) and OTC Tylenol PRN. Denies any side side effects from the regimen including somnolence and constipation. The regimen helps \"take the edge off\". Notes improvement in activity and function with regimen. ADL's by self. Denies any bowel or bladder changes. He takes Elavil 50mg at night for peripheral neuropathy (Dr. Russell), Allopurinol for gout (Dr. Sanchez), and Sinemet (MY Geller).      Not a candidate for NSAIDs - history of TIA's and remains on Eliquis (prescribed by Dr. Sanchez)     Unable to tolerate Pregabalin - caused BLE swelling    Back Pain  This is a chronic problem. The current episode started more than 1 year ago. The problem occurs constantly. The problem is unchanged. The pain is present in the lumbar spine. The quality of the pain is described as aching (throbbing). Radiates to: to bilateral legs L > R  The pain is at a severity of 9/10. The symptoms are " aggravated by standing, sitting, twisting, bending, lying down and position (walking long distances, weather changes). Stiffness is present All day. Associated symptoms include numbness and weakness. Pertinent negatives include no abdominal pain, chest pain, dysuria, fever or headaches. He has tried heat and analgesics (rest/reposition, PT in the past (this caused increased pain)) for the symptoms. The treatment provided mild relief.   Joint Pain  This is a chronic (bilateral hands, left shoulder, bilateral knees) problem. The current episode started more than 1 year ago. The problem occurs daily. The problem has been unchanged. Associated symptoms include arthralgias, numbness and weakness. Pertinent negatives include no abdominal pain, chest pain, chills, congestion, coughing, fatigue, fever or headaches. The symptoms are aggravated by walking, standing and bending (weather changes, increased physical activity, ). He has tried oral narcotics, position changes, rest, heat, lying down and acetaminophen for the symptoms. The treatment provided moderate relief.      PEG Assessment   What number best describes your pain on average in the past week?8  What number best describes how, during the past week, pain has interfered with your enjoyment of life?10  What number best describes how, during the past week, pain has interfered with your general activity?  5    Review of Pertinent Medical Data ---  Reviewed office note from MY Geller from 12/27/24.  Presents for evaluation Parkinson disease and follow-up of peripheral neuropathy.  Previously prescribed amitriptyline nightly which was helpful for pain in his feet.  Levodopa was increased to  mg 1 tablet 3 times daily at his last office visit.  He reports that he did not get this medication from his pharmacy.  2 recent falls.  He has been taking gabapentin 100 mg twice a day somewhat helpful for the shooting pain but he continues to have breakthrough  "pain.  He notes he is having difficulty swallowing but is following with GI.  He is post to be utilizing a thickener for his liquids but does not follow these recommendations.  Levodopa was sent to a new pharmacy per patient request.  Gabapentin was also increased to 200 mg twice a day.  He was instructed to wean his amitriptyline as he does not feel it was working.  Will follow-up in 3 months with Dr. Russell.  A referral was placed to physical therapy for weakness balance and gait training.       The following portions of the patient's history were reviewed and updated as appropriate: allergies, current medications, past family history, past medical history, past social history, past surgical history, and problem list.    Review of Systems   Constitutional:  Negative for chills, fatigue and fever.   HENT:  Negative for congestion.    Respiratory:  Negative for cough.    Cardiovascular:  Negative for chest pain.   Gastrointestinal:  Negative for abdominal pain, constipation and diarrhea.   Genitourinary:  Negative for difficulty urinating and dysuria.   Musculoskeletal:  Positive for arthralgias and back pain.   Neurological:  Positive for weakness and numbness. Negative for headaches. Seizures: bilateral hands and right foot.  Psychiatric/Behavioral:  Negative for sleep disturbance and suicidal ideas. The patient is not nervous/anxious.      I have reviewed and confirmed the accuracy of the ROS as documented by the MA/LPN/RN MY Perez    Vitals:    12/20/24 1422   BP: 157/65   Pulse: 72   Resp: 18   SpO2: 100%   Weight: 67.5 kg (148 lb 12.8 oz)   Height: 172.7 cm (68\")   PainSc:   8   PainLoc: Back       Objective   Physical Exam  Constitutional:       Appearance: Normal appearance.   HENT:      Head: Normocephalic.   Cardiovascular:      Rate and Rhythm: Normal rate and regular rhythm.   Pulmonary:      Effort: Pulmonary effort is normal.      Breath sounds: Decreased air movement present. "   Musculoskeletal:      Cervical back: Normal range of motion.      Lumbar back: Tenderness present. Decreased range of motion. Positive right straight leg raise test and positive left straight leg raise test.      Comments: Diffuse arthritic changes   Skin:     General: Skin is warm and dry.      Capillary Refill: Capillary refill takes less than 2 seconds.   Neurological:      General: No focal deficit present.      Mental Status: He is alert and oriented to person, place, and time.      Gait: Gait abnormal (slowed, ambulates with walker).   Psychiatric:         Mood and Affect: Mood normal.         Behavior: Behavior normal.         Thought Content: Thought content normal.         Cognition and Memory: Cognition normal.       Assessment & Plan   Diagnoses and all orders for this visit:    1. Chronic bilateral low back pain without sciatica (Primary)    2. Chronic pain of both hips  -     HYDROcodone-acetaminophen (NORCO) 5-325 MG per tablet; Take 1 tablet by mouth 3 (Three) Times a Day As Needed for Severe Pain or Moderate Pain. 30 day supply.  Dispense: 90 tablet; Refill: 0    3. Arthralgia of multiple joints  -     HYDROcodone-acetaminophen (NORCO) 5-325 MG per tablet; Take 1 tablet by mouth 3 (Three) Times a Day As Needed for Severe Pain or Moderate Pain. 30 day supply.  Dispense: 90 tablet; Refill: 0    4. DDD (degenerative disc disease), lumbosacral  -     HYDROcodone-acetaminophen (NORCO) 5-325 MG per tablet; Take 1 tablet by mouth 3 (Three) Times a Day As Needed for Severe Pain or Moderate Pain. 30 day supply.  Dispense: 90 tablet; Refill: 0    5. Degeneration of intervertebral disc of lumbosacral region with discogenic back pain    6. Lumbar facet arthropathy    7. Encounter for long-term (current) use of high-risk medication    8. Balance problem  -     Ambulatory Referral to Physical Therapy for Evaluation & Treatment    9. Weakness of both lower extremities  -     Ambulatory Referral to Physical  Therapy for Evaluation & Treatment    10. Parkinson's disease, unspecified whether dyskinesia present, unspecified whether manifestations fluctuate  -     Ambulatory Referral to Speech Therapy for Evaluation & Treatment      Madhu Santoro reports a pain score of 8.  Given his pain assessment as noted, treatment options were discussed and the following options were decided upon as a follow-up plan to address the patient's pain: continuation of current treatment plan for pain and prescription for opiod analgesics.    --- The urine drug screen confirmation from 10/31/24 has been reviewed and the result is appropriate based on patient history and RUTH report  --- The patient signed an updated copy of the controlled substance agreement on 6/24/24   --- ORT performed on 6/24/24 -- low risk  --- Continue Hydrocodone. Patient appears stable with current regimen. No adverse effects. Regarding continuation of opioids, there is no evidence of aberrant behavior or any red flags.  The patient continues with appropriate response to opioid therapy. ADL's remain intact by self.   --- Referral to PT for worsening leg weakness and balance training.  Wife states referral that was placed by Neurology went to the wrong location.  --- Referral to speech therapy per patient request.  --- Follow-up 2 months or sooner if needed     RUTH REPORT  As part of the patient's treatment plan, I am prescribing controlled substances. The patient has been made aware of appropriate use of such medications, including potential risk of somnolence, limited ability to drive and/or work safely, and the potential for dependence or overdose. It has also been made clear that these medications are for use by this patient only, without concomitant use of alcohol or other substances unless prescribed.     Patient has completed prescribing agreement detailing terms of continued prescribing of controlled substances, including monitoring RUTH reports, urine  drug screening, and pill counts if necessary. The patient is aware that inappropriate use will results in cessation of prescribing such medications.    As the clinician, I personally reviewed the RUTH from 12/20/24 while the patient was in the office today.    History and physical exam exhibit continued safe and appropriate use of controlled substances.    Dictated utilizing Dragon dictation.

## 2025-01-03 DIAGNOSIS — G60.9 PERIPHERAL NEUROPATHY, IDIOPATHIC: ICD-10-CM

## 2025-01-03 RX ORDER — GABAPENTIN 100 MG/1
100 CAPSULE ORAL 2 TIMES DAILY
Qty: 60 CAPSULE | Refills: 0 | Status: SHIPPED | OUTPATIENT
Start: 2025-01-03

## 2025-01-07 ENCOUNTER — TELEPHONE (OUTPATIENT)
Dept: ONCOLOGY | Facility: CLINIC | Age: 80
End: 2025-01-07

## 2025-01-07 NOTE — TELEPHONE ENCOUNTER
Caller: Brooke Santoro ([facility])    Relationship to patient: Emergency Contact    Best call back number:  [phone]    Type of visit: LAB, FU1    Requested date: FIRST AVAIL     If rescheduling, when is the original appointment: 1/8

## 2025-01-13 ENCOUNTER — TRANSCRIBE ORDERS (OUTPATIENT)
Dept: ADMINISTRATIVE | Facility: HOSPITAL | Age: 80
End: 2025-01-13
Payer: MEDICARE

## 2025-01-13 DIAGNOSIS — I73.9 PERIPHERAL VASCULAR DISEASE, UNSPECIFIED: Primary | ICD-10-CM

## 2025-01-14 NOTE — THERAPY EVALUATION
Acute Care - Speech Language Pathology   Swallow Initial Evaluation [facility]     Patient Name: Madhu Santoro  : 1945  MRN: [id number]  Today's Date: 2023               Admit Date: 2023    Visit Dx:     ICD-10-CM ICD-9-CM   1. Weakness  R53.1 780.79   2. Elevated troponin  R79.89 790.6   3. Hypoxia  R09.02 799.02     Patient Active Problem List   Diagnosis    Anxiety    Arthritis    Coronary artery disease due to lipid rich plaque    HLD (hyperlipidemia)    Benign essential hypertension    DDD (degenerative disc disease), lumbosacral    Cerebrovascular accident    Encounter for screening for cardiovascular disorders    Gastroesophageal reflux disease    Hyperlipidemia    Stenosis of carotid artery    Former smoker    History of cardiac catheterization    S/P ablation of atrial flutter    Typical atrial flutter    S/P CABG (coronary artery bypass graft)    Adenomatous polyp of ascending colon    Abdominal bloating    Stroke    PAD (peripheral artery disease)    Acute cholecystitis    Right upper quadrant abdominal pain    Chronic pain of both hips    Chronic bilateral low back pain without sciatica    Sacroiliac joint dysfunction of both sides    Encounter for long-term (current) use of high-risk medication    Lumbar facet arthropathy    Stroke-like symptoms    CVA (cerebral vascular accident)    Personal history of colonic polyps    Arthralgia of multiple joints    Iron deficiency    Thrombocytosis    Left ureteral stone    Obstructive uropathy    Chronic anticoagulation    Facial skin lesion    Iron deficiency anemia    Polycythemia    Neuropathy    Weakness    Pneumonia    Close exposure to COVID-19 virus    Polycythemia vera    Chronic gout without tophus    COVID-19    Cytokine release syndrome, grade 2    Anemia    History of CVA (cerebrovascular accident)    S/P percutaneous endoscopic gastrostomy (PEG) tube placement    Mandel esophagus    Elevated troponin    Leukocytosis     PHYSICAL / OCCUPATIONAL THERAPY - DAILY TREATMENT NOTE    Patient Name: Arti Sherman    Date: 2025    : 1945  Insurance: Payor: KENDRA RAMOS / Plan: [facility] / Product Type: *No Product type* /      Patient  verified Yes     Visit #   Current / Total 4 24   Time   In / Out 858 [id number]   Pain   In / Out 1 0   Subjective Functional Status/Changes: Patient states she feels her pain in her knee gets worse with the weather. Patient states she has not been every compliant with HEP over the last week.      TREATMENT AREA =  Left knee pain [M25.562]     OBJECTIVE      Therapeutic Procedures:  76597 Therapeutic Exercise (timed):  increase ROM, strength, coordination, balance, and proprioception to improve patient's ability to progress to PLOF and address remaining functional goals.   Tx Min Billable or 1:1 Min   (if diff from Tx Min) Details:   15   See flow sheet as applicable      17581 Neuromuscular Re-Education (timed):  improve balance, coordination, kinesthetic sense, posture, core stability and proprioception to improve patient's ability to develop conscious control of individual muscles and awareness of position of extremities in order to progress to PLOF and address remaining functional goals.   Tx Min Billable or 1:1 Min   (if diff from Tx Min) Details:   15   See flow sheet as applicable      71188 Therapeutic Activity (timed):  use of dynamic activities replicating functional movements to increase ROM, strength, coordination, balance, and proprioception in order to improve patient's ability to progress to PLOF and address remaining functional goals.   Tx Min Billable or 1:1 Min   (if diff from Tx Min) Details:   9   See flow sheet as applicable         39   [facility] Totals Reminder: bill using total billable min of TIMED therapeutic procedures (example: do not include dry needle or estim unattended, both untimed codes, in totals to left)  8-22 min = 1 unit; 23-37 min = 2 units; 38-52 min = 3 units;  "Sacral decubitus ulcer     Past Medical History:   Diagnosis Date    Anxiety     Arthritis     Atrial flutter     Status post cavotricuspid isthmus ablation by Dr. Gustafson on 4/18/17    Mandel esophagus     Benign essential hypertension     CAD (coronary artery disease)     3 vessel CABG 4/11/17 by Dr. Cortez: ROSARIO-prox LAD, SVG-OM1, SVG-OM3    Carotid artery disease     Status post carotid endarterectomy - [facility] 4/10/17: 50-59% NICHELLE, 1-15% LICA.     Colonic polyp     Cyst of pancreas     DDD (degenerative disc disease), lumbosacral     GERD (gastroesophageal reflux disease)     H/O bone density study 2013    H/O complete eye exam 2014    History of kidney stone     HLD (hyperlipidemia)     Hypertension     Kidney stone     8/22/22    Lipid screening 05/31/2013    Low back pain     [facility]    Screening for prostate cancer 07/07/2015    Skin cancer     nose    Stroke     RESIDUAL--\"BALANCE ISSUES\"     Past Surgical History:   Procedure Laterality Date    CARDIAC CATHETERIZATION N/A 04/10/2017    Procedure: Left Heart Cath;  Surgeon: Marjorie Healy MD;  Location: [facility] CATH INVASIVE LOCATION;  Service:     CARDIAC CATHETERIZATION N/A 04/10/2017    Procedure: Coronary angiography;  Surgeon: Marjorie Healy MD;  [facility];  Service:     CARDIAC CATHETERIZATION N/A 04/10/2017    Procedure: Left ventriculography;  Surgeon: Marjorie Healy MD;  Location: [facility] CATH INVASIVE LOCATION;  Service:     CARDIAC CATHETERIZATION  2011    CARDIAC ELECTROPHYSIOLOGY PROCEDURE N/A 04/18/2017    Procedure: Ablation atrial flutter;  Surgeon: Jose Antonio Gustafson MD;  Location: [facility] CATH INVASIVE LOCATION;  Service:     CAROTID ENDARTERECTOMY      CHOLECYSTECTOMY      CHOLECYSTECTOMY WITH INTRAOPERATIVE CHOLANGIOGRAM N/A 09/07/2019    Procedure: Laparoscopic cholecystectomy with intraoperative cholangiogram;  Surgeon: Gauri Travis MD;  Location: [facility] OR;  Service: " General    COLONOSCOPY  01/06/2015    Diverticulosis, one TA    COLONOSCOPY N/A 02/14/2019    tics, NBIH, adenomatous polyp x 2    COLONOSCOPY N/A 11/05/2021    Procedure: COLONOSCOPY TO CECUM AND TERM. ILEUM WITH COLD POLYPECTOMIES;  Surgeon: Everton Abel MD;  [facility];  Service: Gastroenterology;  Laterality: N/A;  PRE OP - PERS H/O POLYPS  POST OP - COLON POLYPS,, DIVERTICULOSIS, HEMORRHOIDS    CORONARY ARTERY BYPASS GRAFT N/A 04/11/2017    Procedure: AR STERNOTOMY CORONARY ARTERY BYPASS GRAFT TIMES 3 USING LEFT INTERNAL MAMMARY ARTERY AND LEFT GREATER SAPHENOUS VEIN GRAFT PER ENDOSCOPIC VEIN HARVESTING AND PRP ;  Surgeon: Temo Cortez MD;  Location: [facility] MAIN OR;  Service:     ENDOSCOPY  01/06/2015    HH, Ervin's esophagus    ENDOSCOPY N/A 02/14/2019    Z line irregular, HH, Ervin's esophagus    ENDOSCOPY N/A 11/05/2021    Procedure: ESOPHAGOGASTRODUODENOSCOPY WITH BIOPSIES;  Surgeon: Everton Abel MD;  [facility];  Service: Gastroenterology;  Laterality: N/A;  PRE OP - PERS H/O ERVIN'S  POST OP - IRREG Z LINE    ENDOSCOPY W/ PEG TUBE PLACEMENT N/A 11/6/2023    Procedure: ESOPHAGOGASTRODUODENOSCOPY WITH PERCUTANEOUS ENDOSCOPIC GASTROSTOMY TUBE INSERTION;  Surgeon: Temo Ramsey MD;  Location: [facility] ENDOSCOPY;  Service: General;  Laterality: N/A;  Pre: dysphagia  Post: same    KNEE SURGERY Left     URETEROSCOPY LASER LITHOTRIPSY WITH STENT INSERTION Left 8/23/2022    Procedure: Cysto retrograde with left uretro stent placement;  Surgeon: Damien Oliveira MD;  Location: [facility] MAIN OR;  Service: Urology;  Laterality: Left;    VASECTOMY         SLP Recommendation and Plan  SLP Swallowing Diagnosis: severe, oral dysphagia, pharyngeal dysphagia (11/23/23 0940)  SLP Diet Recommendation: NPO, ice chips between meals after oral care, with supervision (11/23/23 0940)  Recommended Precautions and Strategies: upright posture during/after eating  (11/23/23 0940)  SLP Rec. for Method of Medication Administration: meds via alternate route (11/23/23 0940)     Monitor for Signs of Aspiration: yes, notify SLP if any concerns (11/23/23 0940)  Recommended Diagnostics: reassess via clinical swallow evaluation (11/23/23 0940)  Swallow Criteria for Skilled Therapeutic Interventions Met: demonstrates skilled criteria (11/23/23 0940)  Anticipated Discharge Disposition (SLP): anticipate therapy at next level of care, skilled nursing facility (11/23/23 0940)  Rehab Potential/Prognosis, Swallowing: adequate, monitor progress closely (11/23/23 0940)  Therapy Frequency (Swallow): PRN (11/23/23 0940)  Predicted Duration Therapy Intervention (Days): until discharge (11/23/23 0940)  Oral Care Recommendations: Before ice/water, Oral Care before breakfast, after meals and PRN (11/23/23 0940)                                      Oral Care Recommendations: Before ice/water, Oral Care before breakfast, after meals and PRN (11/23/23 0940)    Plan of Care Reviewed With: patient, spouse  Outcome Evaluation: Clinical swallow evaluation completed. Per chart, recent VFSS completed 11/06 with recommendation for NPO due to severe oropharyngeal dysphagia. Patient underwent PEG placement 11/09. Patient's spouse present for evaluation who reported slow progress. Patient agreeable to p.o. trials this date. Demonstrated no overt s/sx of aspiration with ice chips x4, nectar via spoon, or honey via spoon. Immediate cough with thin liquid via spoon. SLP encouraged patient to utilize double swallow strategy with all trials due to residue noted on VFSS. Patient refused further trials due to increased back pain. Recommend patient continue NPO with alternate means of nutrition. Allow for ice chips sparingly after oral care for free water protocol. Extensive education provided to patient and spouse regarding recent VFSS results and current recommendations. Both agreeable to recommendations. ST will  continue to follow for swallow re-evaluation and dysphagia therapy.      SWALLOW EVALUATION (last 72 hours)       SLP Adult Swallow Evaluation       Row Name 11/23/23 0932       Rehab Evaluation    Document Type evaluation  -SR    Subjective Information no complaints  -SR    Patient Observations alert;cooperative;agree to therapy  -SR    Patient/Family/Caregiver Comments/Observations Pt spouse present at bedside. Reported patient participates in speech therapy at [facility] for dysphagia  -SR    Patient Effort adequate  -SR    Symptoms Noted During/After Treatment increased pain;other (see comments)  pt refused further trials due to increased back pain  -SR    Oral Care lip/mouth moisturizer applied;oral rinse provided  -SR       General Information    Patient Profile Reviewed yes  -SR    Pertinent History Of Current Problem 77 y.o. male; presented to ED from SNF due to abnormal labs. Recent hospitalization 10/23-11/09. During hospitalization, patient underwent PEG placement due to severe oropharyngeal dysphagia. Per chart, VFSS completed 11/06 significant for aspiration of thin and nectar thick liquids.  -SR    Current Method of Nutrition NPO;gastrostomy feedings  -SR    Precautions/Limitations, Vision WFL;for purposes of eval  -SR    Precautions/Limitations, Hearing WFL;for purposes of eval  -SR    Prior Level of Function-Communication unknown  -SR    Prior Level of Function-Swallowing NPO;ice between meals;other (see comments)  honey thick liquid trials with SLP  -SR    Plans/Goals Discussed with patient and family;agreed upon  -SR    Barriers to Rehab medically complex  -SR       Oral Motor Structure and Function    Dentition Assessment missing teeth  -SR    Secretion Management dried secretions in oral cavity  -SR    Mucosal Quality sticky  -SR    Volitional Swallow weak  -SR    Volitional Cough weak  -SR       Oral Musculature and Cranial Nerve Assessment    Oral Motor General Assessment  generalized oral motor weakness  -SR       General Eating/Swallowing Observations    Respiratory Support Currently in Use nasal cannula  -SR    Eating/Swallowing Skills fed by SLP  -SR    Positioning During Eating upright 90 degree;upright in bed  -SR    Utensils Used spoon  -SR    Consistencies Trialed ice chips;thin liquids;nectar/syrup-thick liquids;honey-thick liquids  -SR       Clinical Swallow Eval    Clinical Swallow Evaluation Summary Clinical swallow evaluation completed. Per chart, recent VFSS completed 11/06 with recommendation for NPO due to severe oropharyngeal dysphagia.  Patient underwent PEG placement 11/09. Patient's spouse present for evaluation who reported slow progress. Patient agreeable to p.o. trials this date. Demonstrated no overt s/sx of aspiration with ice chips x4, nectar via spoon, or honey via spoon. Immediate cough with thin liquid via spoon. SLP encouraged patient to utilize double swallow strategy with all trials due to residue noted on VFSS. Patient refused further trials due to increased back pain. Recommend patient continue NPO with alternate means of nutrition.  Allow for ice chips sparingly after oral care for free water protocol. Extensive education provided to patient and spouse regarding recent VFSS results and current recommendations. Both agreeable to recommendations. ST will continue to follow for swallow re-evaluation and dysphagia therapy.  -SR       SLP Evaluation Clinical Impression    SLP Swallowing Diagnosis severe;oral dysphagia;pharyngeal dysphagia  -SR    Functional Impact risk of aspiration/pneumonia;risk of malnutrition;risk of dehydration  -SR    Rehab Potential/Prognosis, Swallowing adequate, monitor progress closely  -SR    Swallow Criteria for Skilled Therapeutic Interventions Met demonstrates skilled criteria  -SR       Recommendations    Therapy Frequency (Swallow) PRN  -SR    Predicted Duration Therapy Intervention (Days) until discharge  -SR    SLP Diet  Recommendation NPO;ice chips between meals after oral care, with supervision  -SR    Recommended Diagnostics reassess via clinical swallow evaluation  -SR    Recommended Precautions and Strategies upright posture during/after eating  -SR    Oral Care Recommendations Before ice/water;Oral Care before breakfast, after meals and PRN  -SR    SLP Rec. for Method of Medication Administration meds via alternate route  -SR    Monitor for Signs of Aspiration yes;notify SLP if any concerns  -SR    Anticipated Discharge Disposition (SLP) anticipate therapy at next level of care;skilled nursing facility  -SR       Swallow Goals (SLP)    Swallow STGs pharyngeal strengthening exercise goal selection (SLP)  -SR    Pharyngeal Strengthening Exercise Goal Selection (SLP) pharyngeal strengthening exercise, SLP goal 1  -SR       (STG) Pharyngeal Strengthening Exercise Goal 1 (SLP)    Activity (Pharyngeal Strengthening Goal 1, SLP) increase timing;increase superior movement of the hyolaryngeal complex;increase epiglottic inversion and retroflexion  -SR    Increase Timing Mendelsohn;hard effortful swallow  -SR    Increase Superior Movement of the Hyolaryngeal Complex hard effortful swallow  -SR    Increase Epiglottic Inversion and Retroflexion hard effortful swallow;falsetto  -SR    [place]/Accuracy (Pharyngeal Strengthening Goal 1, SLP) with moderate cues (50-74% accuracy)  -SR    Time Frame (Pharyngeal Strengthening Goal 1, SLP) by discharge  -SR    Progress/Outcomes (Pharyngeal Strengthening Goal 1, SLP) new goal  -SR              User Key  (r) = Recorded By, (t) = Taken By, (c) = Cosigned By      Initials Name Effective Dates    SR Trang Figueroa CCC-SLP 11/10/22 -                     EDUCATION  The patient has been educated in the following areas:   Dysphagia (Swallowing Impairment) Oral Care/Hydration NPO rationale.        SLP GOALS       Row Name 11/23/23 4240             (STG) Pharyngeal Strengthening Exercise Goal 1 (SLP)     Activity (Pharyngeal Strengthening Goal 1, SLP) increase timing;increase superior movement of the hyolaryngeal complex;increase epiglottic inversion and retroflexion  -SR      Increase Timing Mendelsohn;hard effortful swallow  -SR      Increase Superior Movement of the Hyolaryngeal Complex hard effortful swallow  -SR      Increase Epiglottic Inversion and Retroflexion hard effortful swallow;falsetto  -SR      [place]/Accuracy (Pharyngeal Strengthening Goal 1, SLP) with moderate cues (50-74% accuracy)  -SR      Time Frame (Pharyngeal Strengthening Goal 1, SLP) by discharge  -SR      Progress/Outcomes (Pharyngeal Strengthening Goal 1, SLP) new goal  -SR                User Key  (r) = Recorded By, (t) = Taken By, (c) = Cosigned By      Initials Name Provider Type    SR Trang Figueroa CCC-SLP Speech and Language Pathologist                       Time Calculation:    Time Calculation- SLP       Row Name 11/23/23 1118             Time Calculation- SLP    SLP Start Time 0940  -SR      SLP Received On 11/23/23  -SR         Untimed Charges    62358-MI Eval Oral Pharyng Swallow Minutes 60  -SR         Total Minutes    Untimed Charges Total Minutes 60  -SR       Total Minutes 60  -SR                User Key  (r) = Recorded By, (t) = Taken By, (c) = Cosigned By      Initials Name Provider Type    SR Trang Figueroa CCC-SLP Speech and Language Pathologist                    Therapy Charges for Today       Code Description Service Date Service Provider Modifiers Qty    [id number]  ST EVAL ORAL PHARYNG SWALLOW 4 11/23/2023 Trang Figueroa CCC-SLP GN 1                 JYOTHI Pavon  11/23/2023

## 2025-01-21 ENCOUNTER — OFFICE VISIT (OUTPATIENT)
Dept: ONCOLOGY | Facility: CLINIC | Age: 80
End: 2025-01-21
Payer: MEDICARE

## 2025-01-21 ENCOUNTER — LAB (OUTPATIENT)
Dept: LAB | Facility: HOSPITAL | Age: 80
End: 2025-01-21
Payer: MEDICARE

## 2025-01-21 ENCOUNTER — HOSPITAL ENCOUNTER (OUTPATIENT)
Dept: CARDIOLOGY | Facility: HOSPITAL | Age: 80
Discharge: HOME | End: 2025-01-21
Admitting: PODIATRIST
Payer: MEDICARE

## 2025-01-21 VITALS
TEMPERATURE: 97.3 F | DIASTOLIC BLOOD PRESSURE: 73 MMHG | OXYGEN SATURATION: 98 % | BODY MASS INDEX: 21.96 KG/M2 | HEIGHT: 68 IN | WEIGHT: 144.9 LBS | HEART RATE: 66 BPM | SYSTOLIC BLOOD PRESSURE: 149 MMHG

## 2025-01-21 DIAGNOSIS — D45 POLYCYTHEMIA VERA: ICD-10-CM

## 2025-01-21 DIAGNOSIS — D45 POLYCYTHEMIA VERA: Primary | ICD-10-CM

## 2025-01-21 DIAGNOSIS — I73.9 PERIPHERAL VASCULAR DISEASE, UNSPECIFIED: ICD-10-CM

## 2025-01-21 LAB
BASOPHILS # BLD AUTO: 0.19 10*3/MM3 (ref 0–0.2)
BASOPHILS NFR BLD AUTO: 0.6 % (ref 0–1.5)
BH CV LOWER ARTERIAL LEFT ABI RATIO: 0.97
BH CV LOWER ARTERIAL LEFT CALF RATIO: 1.19
BH CV LOWER ARTERIAL LEFT DORSALIS PEDIS SYS MAX: 144
BH CV LOWER ARTERIAL LEFT GREAT TOE SYS MAX: 70
BH CV LOWER ARTERIAL LEFT HIGH THIGH RATIO: NORMAL
BH CV LOWER ARTERIAL LEFT HIGH THIGH SYS MAX: NORMAL
BH CV LOWER ARTERIAL LEFT LOW THIGH RATIO: NORMAL
BH CV LOWER ARTERIAL LEFT LOW THIGH SYS MAX: NORMAL
BH CV LOWER ARTERIAL LEFT POPLITEAL SYS MAX: 191
BH CV LOWER ARTERIAL LEFT POST TIBIAL SYS MAX: 156
BH CV LOWER ARTERIAL LEFT TBI RATIO: 0.44
BH CV LOWER ARTERIAL RIGHT ABI RATIO: 0.66
BH CV LOWER ARTERIAL RIGHT CALF RATIO: 0.71
BH CV LOWER ARTERIAL RIGHT DORSALIS PEDIS SYS MAX: 103
BH CV LOWER ARTERIAL RIGHT GREAT TOE SYS MAX: 60
BH CV LOWER ARTERIAL RIGHT HIGH THIGH RATIO: NORMAL
BH CV LOWER ARTERIAL RIGHT HIGH THIGH SYS MAX: NORMAL
BH CV LOWER ARTERIAL RIGHT LOW THIGH RATIO: 0.94
BH CV LOWER ARTERIAL RIGHT LOW THIGH SYS MAX: 151
BH CV LOWER ARTERIAL RIGHT POPLITEAL SYS MAX: 114
BH CV LOWER ARTERIAL RIGHT POST TIBIAL SYS MAX: 106
BH CV LOWER ARTERIAL RIGHT TBI RATIO: 0.38
DEPRECATED RDW RBC AUTO: 53.6 FL (ref 37–54)
EOSINOPHIL # BLD AUTO: 0.87 10*3/MM3 (ref 0–0.4)
EOSINOPHIL NFR BLD AUTO: 2.6 % (ref 0.3–6.2)
ERYTHROCYTE [DISTWIDTH] IN BLOOD BY AUTOMATED COUNT: 23.8 % (ref 12.3–15.4)
HCT VFR BLD AUTO: 52.3 % (ref 37.5–51)
HGB BLD-MCNC: 14.4 G/DL (ref 13–17.7)
IMM GRANULOCYTES # BLD AUTO: 0.8 10*3/MM3 (ref 0–0.05)
IMM GRANULOCYTES NFR BLD AUTO: 2.4 % (ref 0–0.5)
LYMPHOCYTES # BLD AUTO: 1.89 10*3/MM3 (ref 0.7–3.1)
LYMPHOCYTES NFR BLD AUTO: 5.7 % (ref 19.6–45.3)
MCH RBC QN AUTO: 19.5 PG (ref 26.6–33)
MCHC RBC AUTO-ENTMCNC: 27.5 G/DL (ref 31.5–35.7)
MCV RBC AUTO: 70.7 FL (ref 79–97)
MONOCYTES # BLD AUTO: 2.14 10*3/MM3 (ref 0.1–0.9)
MONOCYTES NFR BLD AUTO: 6.5 % (ref 5–12)
NEUTROPHILS NFR BLD AUTO: 27.12 10*3/MM3 (ref 1.7–7)
NEUTROPHILS NFR BLD AUTO: 82.2 % (ref 42.7–76)
NRBC BLD AUTO-RTO: 0.1 /100 WBC (ref 0–0.2)
PLATELET # BLD AUTO: 837 10*3/MM3 (ref 140–450)
PMV BLD AUTO: 9.8 FL (ref 6–12)
RBC # BLD AUTO: 7.4 10*6/MM3 (ref 4.14–5.8)
UPPER ARTERIAL LEFT ARM BRACHIAL SYS MAX: 160
UPPER ARTERIAL RIGHT ARM BRACHIAL SYS MAX: 148
WBC NRBC COR # BLD AUTO: 33.01 10*3/MM3 (ref 3.4–10.8)

## 2025-01-21 PROCEDURE — 93923 UPR/LXTR ART STDY 3+ LVLS: CPT

## 2025-01-21 PROCEDURE — 85025 COMPLETE CBC W/AUTO DIFF WBC: CPT

## 2025-01-21 PROCEDURE — 36415 COLL VENOUS BLD VENIPUNCTURE: CPT

## 2025-01-21 PROCEDURE — 93923 UPR/LXTR ART STDY 3+ LVLS: CPT | Performed by: SURGERY

## 2025-01-21 RX ORDER — CEPHALEXIN 500 MG/1
1 CAPSULE ORAL EVERY 12 HOURS SCHEDULED
COMMUNITY
Start: 2024-12-30

## 2025-01-21 NOTE — PROGRESS NOTES
REASON FOR FOLLOW-UP: Polycythemia vera, JAK2 mutation detected    HISTORY OF PRESENT ILLNESS:  He presents today, 1/21/2025, for 6 month follow up for the above diagnosis. His hydrea has continued to be held as counts have been stable overall and he continues to struggle with long COVID, weakness and deconditioning, weight loss, and he recently was also diagnosed with parkinson's. He is here today with his spouse who reports he is stable overall, but that long COVID has significantly impacted his quality of life unfortunately. He continues on Eliquis and notes tolerating this well. He denies bleeding concerns.     Hematologic/oncologic history:     The patient is a 78-year-old male followed with a number of issues including coronary artery disease, status post CABG, atrial flutter, previous CVA hyperlipidemia, GE reflux, carotid vascular disease, and hypertension.     He had been seen by vascular 2/25/2022 with mild progression of carotid disease but otherwise stable and also had follow-up with pain management for back pain 3/28/2022 requiring chronic narcotic therapy.  Patient has been resistant to epidural like procedures.     Unfortunately has had concurrent constipation requiring laxatives and additional opioid antagonist to reduce GI hypomotility.  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     Patient recently reviewed again by pain management with worsening pain.  Patient also saw GI periodically undergoing a follow-up MRI of the abdomen 8/9/2022 with scattered pancreatic cystic lesions unchanged from previous.  He had previously undergone EGD and colonoscopy 11/5/2021 with irregular Z-line and biopsies taken in nonbleeding internal hemorrhoids found during retroflexion that were small.  There are scattered small and large mouth diverticula found in  the sigmoid colon descending colon and splenic flexure.       The patient now presents for thrombocytosis with review of his record over the last year demonstrating a platelet count that is escalated from 3-400,000 now to 8/11/2022 648,000 but concurrent microcytic and hypochromic indices.  These indices have been slowly reducing over the last 2 years approximately followed more closely.        These findings are discussed with the patient 8/16/2022 who indicates that he actually feels about the same though still has issues with back pain.  He is noted no change in bowel habit including, fortunately, improvement of his constipation without the use of additional medication such as Movantik.  He has not noted any change in the color of his stool including dark stools or any blood per rectum, urine though he does note periodic excess bruising from his anticoagulation therapy.       The patient moved to a trial of iron which, unfortunately, worsened his constipation in which he had discontinue. He had further issues in early September with left renal stone, requiring cystoscopy, stent placement 8/23/2022.       He is next seen back 9/28/2022 with repeat studies performed 9/21 demonstrating 5% iron saturation, ferritin of 12.5.  He remains further weight and fatigue, again oral iron intolerant and will require IV iron preparations for his iron deficiency anemia.      The patient is next seen 1/18/2023 with considerable improvement in his testing including H&H now 17.2 and 55.4 though with iron saturation of 8% and ferritin 26.5.  He has not noted any blood loss but remains generally weak and with ongoing periodic abdominal pain.  His major concerns continue to be a disequilibrium since his previous CVA symptoms.  He has not been seen in follow-up by neurology.  He continues to have hematuria by urinary assessment but not gross findings.  We have discussed that he may actually have a myeloproliferative disorder and  requested that he undergo an assessment for JAK2 analysis today.    He is seen back 4/17/2023JAK  2-V617 F mutation having been detected.  In the interval he was admitted 2/19-21/23 for weakness, fall and ER evaluation not demonstrate any acute intracranial process, CT of lumbar spine with lumbar degenerative disease and other studies not show any acute abnormalities.  Fortunately he went on to improve though his wife had a positive COVID test recently on home examination.  His follow-up testing 4/10/2023 include an H&H of 18.3 and 60.9 white count of 14,700 platelet count of 477,000.    He is seen 4/17/2023 and we discussed that he is better served with phlebotomy at this point.  He states he feels so poorly that he is quite willing to try to move to additional therapies including phlebotomy.    Patient initiating Hydrea 1000 mg daily as of 5/19/2023.    He is next seen back in office 6/30/2023.  He is gradually seen an improvement in his hemoglobin hematocrit dropping to a normal CBC approximately 6/30/2023 H&H of 14.1 and 46.6, white count of 4800 and platelet count of 146,000.  He is tolerating treatment well without additional side effects.    Patient seen 9/21/2023 with H&H 11.1 and 32.9, white count 5110, platelet count 151,000.  Patient without additional side effect from Hydrea though dose is now reduced to 500 mg every other day.    He was next seen in office 4/12/2024 after complications following COVID development late last year.  He has been hospitalized several times, and rehab several times and had evidence hemolysis leading to discontinuance of his Hydrea.  He was last seen in mid January stable hematologically.  Unfortunately we did not have additional recommendations as he tried to recover from the infection.        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   "Hematologically, fortunately, he remained stable and is off Hydrea.    He is next seen 7/24/2024 reasonably stable hematologically.  At present no intervention is necessary.                                                                                                                             Past Medical History:   Diagnosis Date    Anxiety     Arthritis     Atrial flutter     Status post cavotricuspid isthmus ablation by Dr. Gustafson on 4/18/17    Mandel esophagus     Benign essential hypertension     CAD (coronary artery disease)     3 vessel CABG 4/11/17 by Dr. Cortez: ROSARIO-prox LAD, SVG-OM1, SVG-OM3    Carotid artery disease     Status post carotid endarterectomy - [facility] 4/10/17: 50-59% NICHELLE, 1-15% LICA.     Colonic polyp     COVID-19 long hauler     Cyst of pancreas     DDD (degenerative disc disease), lumbosacral     GERD (gastroesophageal reflux disease)     H/O bone density study 2013    H/O complete eye exam 2014    History of kidney stone     HLD (hyperlipidemia)     Hypertension     Kidney stone     8/22/22    Lipid screening 05/31/2013    Low back pain     [facility]    Parkinson disease     Screening for prostate cancer 07/07/2015    Skin cancer     nose    Stroke     RESIDUAL--\"BALANCE ISSUES\"        Past Surgical History:   Procedure Laterality Date    CARDIAC CATHETERIZATION N/A 04/10/2017    Procedure: Left Heart Cath;  Surgeon: Marjorie Healy MD;  Location:  [facility] CATH INVASIVE LOCATION;  Service:     CARDIAC CATHETERIZATION N/A 04/10/2017    Procedure: Coronary angiography;  Surgeon: Marjorie Healy MD;  Location:  [facility];  Service:     CARDIAC CATHETERIZATION N/A 04/10/2017    Procedure: Left ventriculography;  Surgeon: Marjorie Healy MD;  Location:  [facility];  Service:     CARDIAC CATHETERIZATION  2011    CARDIAC ELECTROPHYSIOLOGY PROCEDURE N/A 04/18/2017    Procedure: Ablation atrial flutter;  Surgeon: Jose Antonio Gustafson, " MD;  Location: [facility] CATH INVASIVE LOCATION;  Service:     CAROTID ENDARTERECTOMY      CHOLECYSTECTOMY      CHOLECYSTECTOMY WITH INTRAOPERATIVE CHOLANGIOGRAM N/A 09/07/2019    Procedure: Laparoscopic cholecystectomy with intraoperative cholangiogram;  Surgeon: Gauri Travis MD;  Location: [facility] MAIN OR;  Service: General    COLONOSCOPY  01/06/2015    Diverticulosis, one TA    COLONOSCOPY N/A 02/14/2019    tics, NBIH, adenomatous polyp x 2    COLONOSCOPY N/A 11/05/2021    Procedure: COLONOSCOPY TO CECUM AND TERM. ILEUM WITH COLD POLYPECTOMIES;  Surgeon: Everton Abel MD;  Location: [facility] ENDOSCOPY;  Service: Gastroenterology;  Laterality: N/A;  PRE OP - PERS H/O POLYPS  POST OP - COLON POLYPS,, DIVERTICULOSIS, HEMORRHOIDS    CORONARY ARTERY BYPASS GRAFT N/A 04/11/2017    Procedure: AR STERNOTOMY CORONARY ARTERY BYPASS GRAFT TIMES 3 USING LEFT INTERNAL MAMMARY ARTERY AND LEFT GREATER SAPHENOUS VEIN GRAFT PER ENDOSCOPIC VEIN HARVESTING AND PRP ;  Surgeon: Temo Cortez MD;  Location: [facility] MAIN OR;  Service:     ENDOSCOPY  01/06/2015    HH, Ervin's esophagus    ENDOSCOPY N/A 02/14/2019    Z line irregular, HH, Ervin's esophagus    ENDOSCOPY N/A 11/05/2021    Procedure: ESOPHAGOGASTRODUODENOSCOPY WITH BIOPSIES;  Surgeon: Everton Abel MD;  Location: [facility] ENDOSCOPY;  Service: Gastroenterology;  Laterality: N/A;  PRE OP - PERS H/O ERVIN'S  POST OP - IRREG Z LINE    ENDOSCOPY W/ PEG TUBE PLACEMENT N/A 11/6/2023    Procedure: ESOPHAGOGASTRODUODENOSCOPY WITH PERCUTANEOUS ENDOSCOPIC GASTROSTOMY TUBE INSERTION;  Surgeon: Temo Ramsey MD;  Location: [facility] ENDOSCOPY;  Service: General;  Laterality: N/A;  Pre: dysphagia  Post: same    KNEE SURGERY Left     URETEROSCOPY LASER LITHOTRIPSY WITH STENT INSERTION Left 8/23/2022    Procedure: Cysto retrograde with left uretro stent placement;  Surgeon: Damien Oliveira MD;  Location: [facility] OR;  Service: Urology;  Laterality: Left;     VASECTOMY          Current Outpatient Medications on File Prior to Visit   Medication Sig Dispense Refill    allopurinol (ZYLOPRIM) 300 MG tablet TAKE 1 TABLET BY MOUTH DAILY 90 tablet 0    apixaban (ELIQUIS) 2.5 MG tablet tablet Take 1 tablet by mouth Every 12 (Twelve) Hours. Indications: Atrial Fibrillation      carbidopa-levodopa (Sinemet)  MG per tablet Take 1 tablet by mouth 3 (Three) Times a Day. 90 tablet 2    cephalexin (KEFLEX) 500 MG capsule Take 1 capsule by mouth Every 12 (Twelve) Hours.      gabapentin (NEURONTIN) 100 MG capsule TAKE ONE CAPSULE BY MOUTH TWICE DAILY 60 capsule 0    HYDROcodone-acetaminophen (NORCO) 5-325 MG per tablet Take 1 tablet by mouth 3 (Three) Times a Day As Needed for Severe Pain or Moderate Pain. 30 day supply. 90 tablet 0    lisinopril (PRINIVIL,ZESTRIL) 5 MG tablet Take 1 tablet by mouth Daily. 30 tablet 11    Nutritional Supplements (OSMOLITE 1.5 GIANNI PO) 240 Units/oz/day by Gastrostomy Tube route 6 (Six) Times a Day.      RABEprazole (ACIPHEX) 20 MG EC tablet TAKE 1 TABLET BY MOUTH DAILY 90 tablet 0    rosuvastatin (Crestor) 20 MG tablet Take 1 tablet by mouth Every Night. Indications: Cerebrovascular Accident or Stroke 90 tablet 1     Current Facility-Administered Medications on File Prior to Visit   Medication Dose Route Frequency Provider Last Rate Last Admin    cyanocobalamin injection 1,000 mcg  1,000 mcg Intramuscular Q30 Days Damian Sanchez MD   1,000 mcg at 04/06/23 0957        ALLERGIES:    Allergies   Allergen Reactions    Atorvastatin Myalgia     Myalgia    Penicillins Hives, Swelling and Rash     Tolerates cephalosporins         Social History     Socioeconomic History    Marital status:      Spouse name: Brooke    Years of education: 9th grade   Tobacco Use    Smoking status: Former     Current packs/day: 0.00     Average packs/day: 1 pack/day for 50.0 years (50.0 ttl pk-yrs)     Types: Cigarettes     Start date: 1/1/1959     Quit date: 1/1/2009     " Years since quittin.0     Passive exposure: Past    Smokeless tobacco: Never    Tobacco comments:     CAFFEINE USE   Vaping Use    Vaping status: Never Used   Substance and Sexual Activity    Alcohol use: Not Currently     Comment: rarely    Drug use: No    Sexual activity: Defer     Partners: Female        Family History   Problem Relation Age of Onset    Hypertension Mother     Heart disease Mother     Heart attack Mother     Stroke Mother     Heart disease Father     Heart attack Father     Stroke Father     Hypertension Sister     Heart attack Brother     Heart disease Brother     No Known Problems Brother     Heart disease Brother     Heart attack Brother     Diabetes Brother     No Known Problems Maternal Grandmother     No Known Problems Maternal Grandfather     No Known Problems Paternal Grandmother     No Known Problems Paternal Grandfather     Pancreatic cancer Paternal Cousin     Arthritis Other     Diabetes Other     Hypertension Other     Kidney disease Other         stones    Malig Hyperthermia Neg Hx         Objective     Vitals:    25 1552   BP: 149/73   Pulse: 66   Temp: 97.3 °F (36.3 °C)   TempSrc: Oral   SpO2: 98%   Weight: 65.7 kg (144 lb 14.4 oz)   Height: 172.7 cm (67.99\")   PainSc:   8   PainLoc: Leg  Comment: and feet             2025     3:52 PM   Current Status   ECOG score 0     Physical Exam  Vitals reviewed.   Constitutional:       Appearance: Normal appearance. He is well-developed.      Comments: Using cane to ambulate.    HENT:      Head: Normocephalic and atraumatic.   Eyes:      Pupils: Pupils are equal, round, and reactive to light.   Cardiovascular:      Rate and Rhythm: Normal rate.   Pulmonary:      Effort: Pulmonary effort is normal. No respiratory distress.      Breath sounds: Normal breath sounds.   Abdominal:      Palpations: Abdomen is soft.      Comments: G tube in place.   Musculoskeletal:         General: Normal range of motion.      Cervical back: " Normal range of motion.   Skin:     General: Skin is warm and dry.      Findings: No rash.   Neurological:      Mental Status: He is alert and oriented to person, place, and time.         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  These indices have been slowly reducing over the last 2 years approximately followed more closely.  Concurrently is a mild drop in his baseline hemoglobin still well within normal limits.  thrombocytosis thought, initially, to be related to iron deficiency.  Ferritin found to be 16.3 and iron of 26 with 5% saturation and TIBC 511.   The patient was placed on ferrous gluconate which, unfortunately, he was unable to tolerate with worsening constipation.  He had further issues in early September with left renal stone, requiring cystoscopy, stent placement 8/23/2022.  9/28/2022 with repeat studies performed 9/21 demonstrating 5% iron saturation, ferritin of 12.5.  He remains further weight and fatigue, again oral iron intolerant and will require IV iron preparations for his iron deficiency anemia.  We discontinued oral iron and proceeded with Injectafer given 9/28/2022 in 10/5/2022  4/17/2023JAK  2-V617 F mutation having been detected.    In the interval he was admitted 2/19-21/23 for weakness, fall and ER evaluation not demonstrate any acute intracranial process, CT of lumbar spine with lumbar degenerative disease and other studies not show any acute abnormalities.  Fortunately he went on to improve though his wife had  a positive COVID test recently on home examination.  4/10/2023 include an H&H of 18.3 and 60.9 white count of 14,700 platelet count of 477,000.  5/15/2023 hemoglobin 15.3, hematocrit 50.4.  Reviewed with Dr. Lopez plans to hold off on phlebotomy and initiate Hydrea 1000 mg daily.  We will tentatively schedule him for return follow-up visit in 2 weeks with repeat labs and reassessment.  6/1/2020 2:23 weeks of Hydrea 1000 mg daily, WBC 5.0, hemoglobin 15.2, hematocrit 49.8, platelets 113,000.  Hydrea was reduced to 500 mg daily  Stability of counts today, 6/15/2023 on 500 mg daily with WBC 4.81, hemoglobin 14.6, hematocrit 46.7%, platelets 156,000  Patient seen 6/30/2023 with H&H of 14.1 and 46.6 white count of 4800 and platelet count of 1 46,000.  Patient subsequently assessed including 9/21/2023 with H&H gradually dropping 11.1 and 32.9, white count of 5110, platelet count 151,000, .8.  10/20/2023 WBC 8.04, SNC 5.42, Hgb 13.1, Platelets 247,000.  Continue Hydrea 500 mg every other day.  11/9/2023-hospitalized with COVID-pneumonia and severe deconditioningdischarged to nursing home on 11/9/2023 off Hydrea but on Eliquis  Evidence of hemolysis in 12/23 with negative PNH workup and very low titer cold agglutinins  CBC stable - hold hydrea  Assessed 4/12/2024-stable hematologically, Hydrea held  7/2024: This assessment despite leukocytosis and thrombocytosis his anemia is not worsening and intervention with treatment such as Hydrea phlebotomy is not necessary as of yet.  1/21/2025: Platelets elevated to 837,000 today. Over the last several months, blood counts have continued to increase while hydrea has been held. Reviewed results with Dr. Lopez who advised restarting hydrea 500mg daily.     *Hospitalized in mid October to early November with COVID-pneumonia with profound deconditioning discharged to nursing home  Readmitted 11/22/2023 with anemia-patient has not been on hydroxyurea at discharge from the  hospital and remains on Eliquis  MCV is dropped significantly suggesting iron depletion and blood loss (but also may be related to being off Hydrea)  Patient himself denies hematochezia or melena  B12 folate normal ferritin 566 iron saturation 12% reticulocyte count 9% rule out hemolysis although acute blood loss can also cause an elevated reticulocyte  LDH elevated 356 haptoglobin low at 24-suggest hemolysis suggest hemolysis   Crossmatch compatible suggesting indirect claudia neg  Direct claudia Complement +?  Cold agglutinin versus PNH-PNH profile and cold agglutinin titer pending  Hemoglobin stable 11/27/2023-9.2  CT abdomen/pelvis 11/27/2023-suggestive of lower lobe pneumonia, stable pancreatic lesions probable IPMN, no evidence of bleeding  Discharged from rehab on Friday and readmitted with fall and difficulty walking 4 days later-CBC stable  Cold agglutinins 1:32 PNH negative  Patient seen 4/12/24 stable hematologically, Hydrea not reinstituted  Reassessment 7/24/2024 again stable hematologically.      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 Continue on Sinemet.     PLAN:   Restart hydrea 500mg daily.    Continue low dose Eliquis   Return to clinic in 1 month for NP visit and cbc.   Instructed to reach out sooner with any concerns.     MY Andre

## 2025-01-22 DIAGNOSIS — M25.551 CHRONIC PAIN OF BOTH HIPS: ICD-10-CM

## 2025-01-22 DIAGNOSIS — M25.50 ARTHRALGIA OF MULTIPLE JOINTS: ICD-10-CM

## 2025-01-22 DIAGNOSIS — G89.29 CHRONIC PAIN OF BOTH HIPS: ICD-10-CM

## 2025-01-22 DIAGNOSIS — M51.379 DDD (DEGENERATIVE DISC DISEASE), LUMBOSACRAL: ICD-10-CM

## 2025-01-22 DIAGNOSIS — M25.552 CHRONIC PAIN OF BOTH HIPS: ICD-10-CM

## 2025-01-22 RX ORDER — HYDROXYUREA 500 MG/1
500 CAPSULE ORAL DAILY
Qty: 30 CAPSULE | Refills: 1 | Status: SHIPPED | OUTPATIENT
Start: 2025-01-22

## 2025-01-22 NOTE — TELEPHONE ENCOUNTER
Medication Refill Request    Date of phone call: 25    Medication being requested: Norco 5-325 mg si tab  po tid prn  Qty: 90    Date of last visit: 25    Date of last refill:     RUTH up to date?:     Next Follow up?: none scheduled    Any new pertinent information? (i.e, new medication allergies, new use of medications, change in patient's health or condition, non-compliance or inconsistency with prescribing agreement?):

## 2025-01-23 RX ORDER — HYDROCODONE BITARTRATE AND ACETAMINOPHEN 5; 325 MG/1; MG/1
1 TABLET ORAL 3 TIMES DAILY PRN
Qty: 90 TABLET | Refills: 0 | Status: SHIPPED | OUTPATIENT
Start: 2025-01-23

## 2025-01-28 ENCOUNTER — APPOINTMENT (OUTPATIENT)
Dept: GENERAL RADIOLOGY | Facility: HOSPITAL | Age: 80
DRG: 057 | End: 2025-01-28
Payer: MEDICARE

## 2025-01-28 ENCOUNTER — HOSPITAL ENCOUNTER (INPATIENT)
Facility: HOSPITAL | Age: 80
LOS: 4 days | Discharge: NURSING, REHAB OR LONG-TERM CARE | DRG: 057 | End: 2025-02-02
Attending: EMERGENCY MEDICINE | Admitting: INTERNAL MEDICINE
Payer: MEDICARE

## 2025-01-28 DIAGNOSIS — D45 POLYCYTHEMIA VERA: ICD-10-CM

## 2025-01-28 DIAGNOSIS — M25.552 CHRONIC PAIN OF BOTH HIPS: ICD-10-CM

## 2025-01-28 DIAGNOSIS — M25.551 CHRONIC PAIN OF BOTH HIPS: ICD-10-CM

## 2025-01-28 DIAGNOSIS — M51.379 DDD (DEGENERATIVE DISC DISEASE), LUMBOSACRAL: ICD-10-CM

## 2025-01-28 DIAGNOSIS — R73.9 HYPERGLYCEMIA: ICD-10-CM

## 2025-01-28 DIAGNOSIS — M25.50 ARTHRALGIA OF MULTIPLE JOINTS: ICD-10-CM

## 2025-01-28 DIAGNOSIS — R30.0 DYSURIA: ICD-10-CM

## 2025-01-28 DIAGNOSIS — R26.2 DIFFICULTY WALKING: ICD-10-CM

## 2025-01-28 DIAGNOSIS — J06.9 VIRAL URI WITH COUGH: ICD-10-CM

## 2025-01-28 DIAGNOSIS — R79.89 ELEVATED TROPONIN: ICD-10-CM

## 2025-01-28 DIAGNOSIS — G89.29 CHRONIC PAIN OF BOTH HIPS: ICD-10-CM

## 2025-01-28 DIAGNOSIS — G20.A1 PARKINSON'S DISEASE, UNSPECIFIED WHETHER DYSKINESIA PRESENT, UNSPECIFIED WHETHER MANIFESTATIONS FLUCTUATE: ICD-10-CM

## 2025-01-28 DIAGNOSIS — R53.1 GENERAL WEAKNESS: Primary | ICD-10-CM

## 2025-01-28 LAB
ALBUMIN SERPL-MCNC: 4.1 G/DL (ref 3.5–5.2)
ALBUMIN/GLOB SERPL: 1.4 G/DL
ALP SERPL-CCNC: 150 U/L (ref 39–117)
ALT SERPL W P-5'-P-CCNC: 8 U/L (ref 1–41)
ANION GAP SERPL CALCULATED.3IONS-SCNC: 6.3 MMOL/L (ref 5–15)
ANISOCYTOSIS BLD QL: ABNORMAL
AST SERPL-CCNC: 26 U/L (ref 1–40)
B PARAPERT DNA SPEC QL NAA+PROBE: NOT DETECTED
B PERT DNA SPEC QL NAA+PROBE: NOT DETECTED
BACTERIA UR QL AUTO: ABNORMAL /HPF
BASOPHILS # BLD MANUAL: 0 10*3/MM3 (ref 0–0.2)
BASOPHILS NFR BLD MANUAL: 0 % (ref 0–1.5)
BILIRUB SERPL-MCNC: 0.9 MG/DL (ref 0–1.2)
BILIRUB UR QL STRIP: NEGATIVE
BUN SERPL-MCNC: 18 MG/DL (ref 8–23)
BUN/CREAT SERPL: 15.1 (ref 7–25)
C PNEUM DNA NPH QL NAA+NON-PROBE: NOT DETECTED
CALCIUM SPEC-SCNC: 9.9 MG/DL (ref 8.6–10.5)
CHLORIDE SERPL-SCNC: 101 MMOL/L (ref 98–107)
CLARITY UR: ABNORMAL
CO2 SERPL-SCNC: 30.7 MMOL/L (ref 22–29)
COLOR UR: YELLOW
CREAT SERPL-MCNC: 1.19 MG/DL (ref 0.76–1.27)
DEPRECATED RDW RBC AUTO: 49.9 FL (ref 37–54)
EGFRCR SERPLBLD CKD-EPI 2021: 61.8 ML/MIN/1.73
EOSINOPHIL # BLD MANUAL: 0.51 10*3/MM3 (ref 0–0.4)
EOSINOPHIL NFR BLD MANUAL: 2 % (ref 0.3–6.2)
ERYTHROCYTE [DISTWIDTH] IN BLOOD BY AUTOMATED COUNT: 21.7 % (ref 12.3–15.4)
FLUAV SUBTYP SPEC NAA+PROBE: NOT DETECTED
FLUBV RNA ISLT QL NAA+PROBE: NOT DETECTED
GEN 5 1HR TROPONIN T REFLEX: 38 NG/L
GLOBULIN UR ELPH-MCNC: 2.9 GM/DL
GLUCOSE SERPL-MCNC: 136 MG/DL (ref 65–99)
GLUCOSE UR STRIP-MCNC: NEGATIVE MG/DL
HADV DNA SPEC NAA+PROBE: NOT DETECTED
HCOV 229E RNA SPEC QL NAA+PROBE: NOT DETECTED
HCOV HKU1 RNA SPEC QL NAA+PROBE: NOT DETECTED
HCOV NL63 RNA SPEC QL NAA+PROBE: NOT DETECTED
HCOV OC43 RNA SPEC QL NAA+PROBE: NOT DETECTED
HCT VFR BLD AUTO: 53.5 % (ref 37.5–51)
HGB BLD-MCNC: 14.8 G/DL (ref 13–17.7)
HGB UR QL STRIP.AUTO: NEGATIVE
HMPV RNA NPH QL NAA+NON-PROBE: NOT DETECTED
HPIV1 RNA ISLT QL NAA+PROBE: NOT DETECTED
HPIV2 RNA SPEC QL NAA+PROBE: NOT DETECTED
HPIV3 RNA NPH QL NAA+PROBE: NOT DETECTED
HPIV4 P GENE NPH QL NAA+PROBE: NOT DETECTED
HYALINE CASTS UR QL AUTO: ABNORMAL /LPF
KETONES UR QL STRIP: NEGATIVE
LEUKOCYTE ESTERASE UR QL STRIP.AUTO: NEGATIVE
LYMPHOCYTES # BLD MANUAL: 1.31 10*3/MM3 (ref 0.7–3.1)
LYMPHOCYTES NFR BLD MANUAL: 3 % (ref 5–12)
M PNEUMO IGG SER IA-ACNC: NOT DETECTED
MAGNESIUM SERPL-MCNC: 2.3 MG/DL (ref 1.6–2.4)
MCH RBC QN AUTO: 19.8 PG (ref 26.6–33)
MCHC RBC AUTO-ENTMCNC: 27.7 G/DL (ref 31.5–35.7)
MCV RBC AUTO: 71.4 FL (ref 79–97)
MONOCYTES # BLD: 0.77 10*3/MM3 (ref 0.1–0.9)
NEUTROPHILS # BLD AUTO: 23.14 10*3/MM3 (ref 1.7–7)
NEUTROPHILS NFR BLD MANUAL: 89.9 % (ref 42.7–76)
NITRITE UR QL STRIP: NEGATIVE
NRBC BLD AUTO-RTO: 0 /100 WBC (ref 0–0.2)
NT-PROBNP SERPL-MCNC: 265 PG/ML (ref 0–1800)
PH UR STRIP.AUTO: 6 [PH] (ref 5–8)
PLAT MORPH BLD: NORMAL
PLATELET # BLD AUTO: 721 10*3/MM3 (ref 140–450)
PMV BLD AUTO: 9.5 FL (ref 6–12)
POTASSIUM SERPL-SCNC: 4 MMOL/L (ref 3.5–5.2)
PROT SERPL-MCNC: 7 G/DL (ref 6–8.5)
PROT UR QL STRIP: ABNORMAL
QT INTERVAL: 425 MS
QTC INTERVAL: 456 MS
RBC # BLD AUTO: 7.49 10*6/MM3 (ref 4.14–5.8)
RBC # UR STRIP: ABNORMAL /HPF
REF LAB TEST METHOD: ABNORMAL
RHINOVIRUS RNA SPEC NAA+PROBE: NOT DETECTED
RSV RNA NPH QL NAA+NON-PROBE: NOT DETECTED
SARS-COV-2 RNA NPH QL NAA+NON-PROBE: NOT DETECTED
SODIUM SERPL-SCNC: 138 MMOL/L (ref 136–145)
SP GR UR STRIP: 1.02 (ref 1–1.03)
SQUAMOUS #/AREA URNS HPF: ABNORMAL /HPF
T4 FREE SERPL-MCNC: 1.35 NG/DL (ref 0.92–1.68)
TROPONIN T % DELTA: -5
TROPONIN T NUMERIC DELTA: -2 NG/L
TROPONIN T SERPL HS-MCNC: 40 NG/L
TSH SERPL DL<=0.05 MIU/L-ACNC: 3.71 UIU/ML (ref 0.27–4.2)
UROBILINOGEN UR QL STRIP: ABNORMAL
VARIANT LYMPHS NFR BLD MANUAL: 5.1 % (ref 19.6–45.3)
WBC # UR STRIP: ABNORMAL /HPF
WBC MORPH BLD: NORMAL
WBC NRBC COR # BLD AUTO: 25.74 10*3/MM3 (ref 3.4–10.8)

## 2025-01-28 PROCEDURE — 99285 EMERGENCY DEPT VISIT HI MDM: CPT

## 2025-01-28 PROCEDURE — 81001 URINALYSIS AUTO W/SCOPE: CPT | Performed by: EMERGENCY MEDICINE

## 2025-01-28 PROCEDURE — G0378 HOSPITAL OBSERVATION PER HR: HCPCS

## 2025-01-28 PROCEDURE — 85007 BL SMEAR W/DIFF WBC COUNT: CPT | Performed by: EMERGENCY MEDICINE

## 2025-01-28 PROCEDURE — 84484 ASSAY OF TROPONIN QUANT: CPT | Performed by: EMERGENCY MEDICINE

## 2025-01-28 PROCEDURE — 80053 COMPREHEN METABOLIC PANEL: CPT | Performed by: EMERGENCY MEDICINE

## 2025-01-28 PROCEDURE — 71045 X-RAY EXAM CHEST 1 VIEW: CPT

## 2025-01-28 PROCEDURE — 84439 ASSAY OF FREE THYROXINE: CPT | Performed by: EMERGENCY MEDICINE

## 2025-01-28 PROCEDURE — 93010 ELECTROCARDIOGRAM REPORT: CPT | Performed by: INTERNAL MEDICINE

## 2025-01-28 PROCEDURE — 93005 ELECTROCARDIOGRAM TRACING: CPT | Performed by: EMERGENCY MEDICINE

## 2025-01-28 PROCEDURE — 84443 ASSAY THYROID STIM HORMONE: CPT | Performed by: EMERGENCY MEDICINE

## 2025-01-28 PROCEDURE — 83735 ASSAY OF MAGNESIUM: CPT | Performed by: EMERGENCY MEDICINE

## 2025-01-28 PROCEDURE — 83880 ASSAY OF NATRIURETIC PEPTIDE: CPT | Performed by: EMERGENCY MEDICINE

## 2025-01-28 PROCEDURE — 36415 COLL VENOUS BLD VENIPUNCTURE: CPT

## 2025-01-28 PROCEDURE — 85025 COMPLETE CBC W/AUTO DIFF WBC: CPT | Performed by: EMERGENCY MEDICINE

## 2025-01-28 PROCEDURE — 0202U NFCT DS 22 TRGT SARS-COV-2: CPT | Performed by: EMERGENCY MEDICINE

## 2025-01-28 PROCEDURE — P9612 CATHETERIZE FOR URINE SPEC: HCPCS

## 2025-01-28 RX ORDER — AMOXICILLIN 250 MG
2 CAPSULE ORAL 2 TIMES DAILY PRN
Status: DISCONTINUED | OUTPATIENT
Start: 2025-01-28 | End: 2025-02-02 | Stop reason: HOSPADM

## 2025-01-28 RX ORDER — ACETAMINOPHEN 160 MG/5ML
650 SOLUTION ORAL EVERY 4 HOURS PRN
Status: DISCONTINUED | OUTPATIENT
Start: 2025-01-28 | End: 2025-01-28

## 2025-01-28 RX ORDER — ACETAMINOPHEN 160 MG/5ML
650 SOLUTION ORAL EVERY 4 HOURS PRN
Status: DISCONTINUED | OUTPATIENT
Start: 2025-01-28 | End: 2025-02-02 | Stop reason: HOSPADM

## 2025-01-28 RX ORDER — ROSUVASTATIN CALCIUM 20 MG/1
20 TABLET, COATED ORAL NIGHTLY
Status: DISCONTINUED | OUTPATIENT
Start: 2025-01-29 | End: 2025-02-02 | Stop reason: HOSPADM

## 2025-01-28 RX ORDER — POLYETHYLENE GLYCOL 3350 17 G/17G
17 POWDER, FOR SOLUTION ORAL DAILY PRN
Status: DISCONTINUED | OUTPATIENT
Start: 2025-01-28 | End: 2025-01-28

## 2025-01-28 RX ORDER — ACETAMINOPHEN 325 MG/1
650 TABLET ORAL EVERY 4 HOURS PRN
Status: DISCONTINUED | OUTPATIENT
Start: 2025-01-28 | End: 2025-02-02 | Stop reason: HOSPADM

## 2025-01-28 RX ORDER — ONDANSETRON 2 MG/ML
4 INJECTION INTRAMUSCULAR; INTRAVENOUS EVERY 6 HOURS PRN
Status: DISCONTINUED | OUTPATIENT
Start: 2025-01-28 | End: 2025-02-02 | Stop reason: HOSPADM

## 2025-01-28 RX ORDER — SODIUM CHLORIDE 9 MG/ML
75 INJECTION, SOLUTION INTRAVENOUS CONTINUOUS
Status: ACTIVE | OUTPATIENT
Start: 2025-01-29 | End: 2025-01-30

## 2025-01-28 RX ORDER — HYDROXYUREA 500 MG/1
500 CAPSULE ORAL DAILY
Status: DISCONTINUED | OUTPATIENT
Start: 2025-01-29 | End: 2025-02-02 | Stop reason: HOSPADM

## 2025-01-28 RX ORDER — AMOXICILLIN 250 MG
2 CAPSULE ORAL 2 TIMES DAILY PRN
Status: DISCONTINUED | OUTPATIENT
Start: 2025-01-28 | End: 2025-01-28

## 2025-01-28 RX ORDER — BISACODYL 10 MG
10 SUPPOSITORY, RECTAL RECTAL DAILY PRN
Status: DISCONTINUED | OUTPATIENT
Start: 2025-01-28 | End: 2025-02-02 | Stop reason: HOSPADM

## 2025-01-28 RX ORDER — ALLOPURINOL 300 MG/1
300 TABLET ORAL DAILY
Status: DISCONTINUED | OUTPATIENT
Start: 2025-01-29 | End: 2025-02-02 | Stop reason: HOSPADM

## 2025-01-28 RX ORDER — BISACODYL 5 MG/1
5 TABLET, DELAYED RELEASE ORAL DAILY PRN
Status: DISCONTINUED | OUTPATIENT
Start: 2025-01-28 | End: 2025-01-28

## 2025-01-28 RX ORDER — PANTOPRAZOLE SODIUM 40 MG/1
40 TABLET, DELAYED RELEASE ORAL
Status: DISCONTINUED | OUTPATIENT
Start: 2025-01-29 | End: 2025-02-02 | Stop reason: HOSPADM

## 2025-01-28 RX ORDER — POLYETHYLENE GLYCOL 3350 17 G/17G
17 POWDER, FOR SOLUTION ORAL DAILY PRN
Status: DISCONTINUED | OUTPATIENT
Start: 2025-01-28 | End: 2025-02-02 | Stop reason: HOSPADM

## 2025-01-28 RX ORDER — HYDROCODONE BITARTRATE AND ACETAMINOPHEN 5; 325 MG/1; MG/1
1 TABLET ORAL 3 TIMES DAILY PRN
Status: DISCONTINUED | OUTPATIENT
Start: 2025-01-28 | End: 2025-02-02 | Stop reason: HOSPADM

## 2025-01-28 RX ORDER — ACETAMINOPHEN 325 MG/1
650 TABLET ORAL EVERY 4 HOURS PRN
Status: DISCONTINUED | OUTPATIENT
Start: 2025-01-28 | End: 2025-01-28

## 2025-01-28 RX ORDER — LISINOPRIL 5 MG/1
5 TABLET ORAL DAILY
Status: DISCONTINUED | OUTPATIENT
Start: 2025-01-29 | End: 2025-02-02 | Stop reason: HOSPADM

## 2025-01-28 RX ORDER — CARBIDOPA/LEVODOPA 25MG-250MG
1 TABLET ORAL 3 TIMES DAILY
Status: DISCONTINUED | OUTPATIENT
Start: 2025-01-29 | End: 2025-01-29

## 2025-01-28 RX ORDER — BISACODYL 5 MG/1
5 TABLET, DELAYED RELEASE ORAL DAILY PRN
Status: DISCONTINUED | OUTPATIENT
Start: 2025-01-28 | End: 2025-02-02 | Stop reason: HOSPADM

## 2025-01-28 RX ORDER — ACETAMINOPHEN 650 MG/1
650 SUPPOSITORY RECTAL EVERY 4 HOURS PRN
Status: DISCONTINUED | OUTPATIENT
Start: 2025-01-28 | End: 2025-01-28

## 2025-01-28 RX ORDER — BISACODYL 10 MG
10 SUPPOSITORY, RECTAL RECTAL DAILY PRN
Status: DISCONTINUED | OUTPATIENT
Start: 2025-01-28 | End: 2025-01-28

## 2025-01-28 RX ORDER — ACETAMINOPHEN 650 MG/1
650 SUPPOSITORY RECTAL EVERY 4 HOURS PRN
Status: DISCONTINUED | OUTPATIENT
Start: 2025-01-28 | End: 2025-02-02 | Stop reason: HOSPADM

## 2025-01-28 NOTE — ED PROVIDER NOTES
EMERGENCY DEPARTMENT ENCOUNTER  Room Number:  20/20  Date of encounter:  1/28/2025  PCP: Damian Sanchez MD  Patient Care Team:  Damian Sanchez MD as PCP - General (Family Medicine)  Jr Temo Cortez MD as Surgeon (Cardiothoracic Surgery)  Netta Mayorga Jr., MD as Consulting Physician (Vascular Surgery)  Damian Sanchez MD as Referring Physician (Family Medicine)  Aquiles Lopez MD as Consulting Physician (Hematology and Oncology)  Christy Luther APRN as Nurse Practitioner (Nurse Practitioner)  Damian Sanchez MD as Referring Physician (Family Medicine)  Everton Abel MD as Consulting Physician (Gastroenterology)     HPI:  Context: Madhu Santoro is a 79 y.o. male who presents to the ED c/o chief complaint of weakness.  Patient reports history of Parkinson's, reports he lives at home with his wife, does have stairs to the basement but has been unable to walk the stairs for extended period time.  Patient reports he normally ambulates with a walker.  Patient complains of generalized weakness, no focal weakness.  Patient reports he has been progressive over the last 2 weeks, more severe for the last several days, reports he is having difficulty walking secondary to the weakness.  No facial droop, no difficulty speech, no numbness tingling.  Patient denies any fevers or night sweats but has had chills.  Patient complains of productive cough.  No chest pain or shortness of breath.  Patient reports he has had some nausea, denies any emesis, no diarrhea.  Patient does endorse dysuria, no increase in urinary frequency or urgency.    MEDICAL HISTORY REVIEW  Reviewed in EPIC    PAST MEDICAL HISTORY  Active Ambulatory Problems     Diagnosis Date Noted    Anxiety     Arthritis     Coronary artery disease due to lipid rich plaque     HLD (hyperlipidemia)     Benign essential hypertension     DDD (degenerative disc disease), lumbosacral     Cerebrovascular accident     Encounter for screening for  cardiovascular disorders 11/20/2012    Gastroesophageal reflux disease 04/04/2016    Hyperlipidemia 04/04/2016    Stenosis of carotid artery 04/26/2017    Former smoker 04/26/2017    History of cardiac catheterization 12/16/2011    S/P ablation of atrial flutter 04/26/2017    Typical atrial flutter 04/26/2017    S/P CABG (coronary artery bypass graft) 04/26/2017    Adenomatous polyp of ascending colon 02/12/2019    Abdominal bloating 02/12/2019    Stroke 03/29/2019    PAD (peripheral artery disease) 03/29/2019    Acute cholecystitis 09/06/2019    Right upper quadrant abdominal pain 09/06/2019    Chronic pain of both hips 03/31/2021    Chronic bilateral low back pain without sciatica 03/31/2021    Sacroiliac joint dysfunction of both sides 03/31/2021    Encounter for long-term (current) use of high-risk medication 03/31/2021    Lumbar facet arthropathy 03/31/2021    Stroke-like symptoms 04/10/2021    CVA (cerebral vascular accident) 04/11/2021    Personal history of colonic polyps 07/23/2021    Arthralgia of multiple joints 09/28/2021    Iron deficiency 08/16/2022    Thrombocytosis 08/16/2022    Left ureteral stone 08/22/2022    Obstructive uropathy 08/22/2022    Chronic anticoagulation 08/22/2022    Facial skin lesion 08/22/2022    Iron deficiency anemia 09/23/2022    Polycythemia 01/18/2023    Neuropathy 02/08/2023    Weakness 02/19/2023    Pneumonia 02/19/2023    Close exposure to COVID-19 virus 02/19/2023    Polycythemia vera 04/17/2023    Gout 07/09/2023    COVID-19 10/23/2023    Cytokine release syndrome, grade 2 11/09/2023    Anemia 11/22/2023    History of CVA (cerebrovascular accident) 11/22/2023    S/P percutaneous endoscopic gastrostomy (PEG) tube placement 11/22/2023    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    Sacral decubitus ulcer 11/22/2023    Oral phase dysphagia 11/24/2023    Acute blood loss anemia 11/24/2023    Orthostatic dizziness 01/07/2024    Orthostatic hypotension 01/09/2024    Bilateral leg weakness  01/09/2024     Resolved Ambulatory Problems     Diagnosis Date Noted    Elevated troponin 11/22/2023    Leukocytosis 11/22/2023    Hypoxia 11/29/2023     Past Medical History:   Diagnosis Date    Atrial flutter     CAD (coronary artery disease)     Carotid artery disease     Colonic polyp     COVID-19 long hauler     Cyst of pancreas     GERD (gastroesophageal reflux disease)     H/O bone density study 2013    H/O complete eye exam 2014    History of kidney stone     Hypertension     Kidney stone     Lipid screening 05/31/2013    Low back pain     Parkinson disease     Screening for prostate cancer 07/07/2015    Skin cancer        PAST SURGICAL HISTORY  Past Surgical History:   Procedure Laterality Date    CARDIAC CATHETERIZATION N/A 04/10/2017    Procedure: Left Heart Cath;  Surgeon: Marjorie Healy MD;  Location: [facility] CATH INVASIVE LOCATION;  Service:     CARDIAC CATHETERIZATION N/A 04/10/2017    Procedure: Coronary angiography;  Surgeon: Marjorie Healy MD;  Location: [facility] CATH INVASIVE LOCATION;  Service:     CARDIAC CATHETERIZATION N/A 04/10/2017    Procedure: Left ventriculography;  Surgeon: Marjorie Healy MD;  Location: [facility] CATH INVASIVE LOCATION;  Service:     CARDIAC CATHETERIZATION  2011    CARDIAC ELECTROPHYSIOLOGY PROCEDURE N/A 04/18/2017    Procedure: Ablation atrial flutter;  Surgeon: Jose Antonio Gustafson MD;  Location: [facility] CATH INVASIVE LOCATION;  Service:     CAROTID ENDARTERECTOMY      CHOLECYSTECTOMY      CHOLECYSTECTOMY WITH INTRAOPERATIVE CHOLANGIOGRAM N/A 09/07/2019    Procedure: Laparoscopic cholecystectomy with intraoperative cholangiogram;  Surgeon: Gauri Travis MD;  Location: [facility] MAIN OR;  Service: General    COLONOSCOPY  01/06/2015    Diverticulosis, one TA    COLONOSCOPY N/A 02/14/2019    tics, NBIH, adenomatous polyp x 2    COLONOSCOPY N/A 11/05/2021    Procedure: COLONOSCOPY TO CECUM AND TERM. ILEUM WITH COLD POLYPECTOMIES;  Surgeon: Everton Abel MD;  Location: [facility]  ENDOSCOPY;  Service: Gastroenterology;  Laterality: N/A;  PRE OP - PERS H/O POLYPS  POST OP - COLON POLYPS,, DIVERTICULOSIS, HEMORRHOIDS    CORONARY ARTERY BYPASS GRAFT N/A 04/11/2017    Procedure: AR STERNOTOMY CORONARY ARTERY BYPASS GRAFT TIMES 3 USING LEFT INTERNAL MAMMARY ARTERY AND LEFT GREATER SAPHENOUS VEIN GRAFT PER ENDOSCOPIC VEIN HARVESTING AND PRP ;  Surgeon: Temo Cortez MD;  Location: [facility] MAIN OR;  Service:     ENDOSCOPY  01/06/2015    HH, Ervin's esophagus    ENDOSCOPY N/A 02/14/2019    Z line irregular, HH, Ervin's esophagus    ENDOSCOPY N/A 11/05/2021    Procedure: ESOPHAGOGASTRODUODENOSCOPY WITH BIOPSIES;  Surgeon: Everton Abel MD;  Location: [facility] ENDOSCOPY;  Service: Gastroenterology;  Laterality: N/A;  PRE OP - PERS H/O ERVIN'S  POST OP - IRREG Z LINE    ENDOSCOPY W/ PEG TUBE PLACEMENT N/A 11/6/2023    Procedure: ESOPHAGOGASTRODUODENOSCOPY WITH PERCUTANEOUS ENDOSCOPIC GASTROSTOMY TUBE INSERTION;  Surgeon: Temo Ramsey MD;  Location: [facility] ENDOSCOPY;  Service: General;  Laterality: N/A;  Pre: dysphagia  Post: same    KNEE SURGERY Left     URETEROSCOPY LASER LITHOTRIPSY WITH STENT INSERTION Left 8/23/2022    Procedure: Cysto retrograde with left uretro stent placement;  Surgeon: Damien Oliveira MD;  Location: [facility] MAIN OR;  Service: Urology;  Laterality: Left;    VASECTOMY         FAMILY HISTORY  Family History   Problem Relation Age of Onset    Hypertension Mother     Heart disease Mother     Heart attack Mother     Stroke Mother     Heart disease Father     Heart attack Father     Stroke Father     Hypertension Sister     Heart attack Brother     Heart disease Brother     No Known Problems Brother     Heart disease Brother     Heart attack Brother     Diabetes Brother     No Known Problems Maternal Grandmother     No Known Problems Maternal Grandfather     No Known Problems Paternal Grandmother     No Known Problems Paternal Grandfather     Pancreatic  cancer Paternal Cousin     Arthritis Other     Diabetes Other     Hypertension Other     Kidney disease Other         stones    Malig Hyperthermia Neg Hx        SOCIAL HISTORY  Social History     Socioeconomic History    Marital status:      Spouse name: Brooke    Years of education: 9th grade   Tobacco Use    Smoking status: Former     Current packs/day: 0.00     Average packs/day: 1 pack/day for 50.0 years (50.0 ttl pk-yrs)     Types: Cigarettes     Start date: 1959     Quit date: 2009     Years since quittin.0     Passive exposure: Past    Smokeless tobacco: Never    Tobacco comments:     CAFFEINE USE   Vaping Use    Vaping status: Never Used   Substance and Sexual Activity    Alcohol use: Not Currently     Comment: rarely    Drug use: No    Sexual activity: Defer     Partners: Female       ALLERGIES  Atorvastatin and Penicillins    The patient's allergies have been reviewed    REVIEW OF SYSTEMS  All systems reviewed and negative except for those discussed in HPI.     PHYSICAL EXAM  I have reviewed the triage vital signs and nursing notes.  ED Triage Vitals [25 1252]   Temp Heart Rate Resp BP SpO2   98.9 °F (37.2 °C) 78 18 167/84 99 %      Temp src Heart Rate Source Patient Position BP Location FiO2 (%)   -- Monitor -- -- --       General: No acute distress.  HENT: NCAT, PERRL, Nares patent.  Eyes: no scleral icterus.  Neck: trachea midline, no ROM limitations.  CV: regular rhythm, regular rate.  Respiratory: normal effort, CTAB.  Abdomen: soft, nondistended, NTTP, no rebound tenderness, no guarding or rigidity.  Musculoskeletal: no deformity.  Neuro: alert, moves all extremities, follows commands.  Skin: warm, dry.    LAB RESULTS  Recent Results (from the past 24 hours)   ECG 12 Lead Other; wkness    Collection Time: 25  1:09 PM   Result Value Ref Range    QT Interval 425 ms    QTC Interval 456 ms   Comprehensive Metabolic Panel    Collection Time: 25  1:27 PM    Specimen:  Blood   Result Value Ref Range    Glucose 136 (H) 65 - 99 mg/dL    BUN 18 8 - 23 mg/dL    Creatinine 1.19 0.76 - 1.27 mg/dL    Sodium 138 136 - 145 mmol/L    Potassium 4.0 3.5 - 5.2 mmol/L    Chloride 101 98 - 107 mmol/L    CO2 30.7 (H) 22.0 - 29.0 mmol/L    Calcium 9.9 8.6 - 10.5 mg/dL    Total Protein 7.0 6.0 - 8.5 g/dL    Albumin 4.1 3.5 - 5.2 g/dL    ALT (SGPT) 8 1 - 41 U/L    AST (SGOT) 26 1 - 40 U/L    Alkaline Phosphatase 150 (H) 39 - 117 U/L    Total Bilirubin 0.9 0.0 - 1.2 mg/dL    Globulin 2.9 gm/dL    A/G Ratio 1.4 g/dL    BUN/Creatinine Ratio 15.1 7.0 - 25.0    Anion Gap 6.3 5.0 - 15.0 mmol/L    eGFR 61.8 >60.0 mL/min/1.73   High Sensitivity Troponin T    Collection Time: 01/28/25  1:27 PM    Specimen: Blood   Result Value Ref Range    HS Troponin T 40 (H) <22 ng/L   BNP    Collection Time: 01/28/25  1:27 PM    Specimen: Blood   Result Value Ref Range    proBNP 265.0 0.0 - 1,800.0 pg/mL   T4, Free    Collection Time: 01/28/25  1:27 PM    Specimen: Blood   Result Value Ref Range    Free T4 1.35 0.92 - 1.68 ng/dL   TSH    Collection Time: 01/28/25  1:27 PM    Specimen: Blood   Result Value Ref Range    TSH 3.710 0.270 - 4.200 uIU/mL   Magnesium    Collection Time: 01/28/25  1:27 PM    Specimen: Blood   Result Value Ref Range    Magnesium 2.3 1.6 - 2.4 mg/dL   CBC Auto Differential    Collection Time: 01/28/25  1:27 PM    Specimen: Blood   Result Value Ref Range    WBC 25.74 (H) 3.40 - 10.80 10*3/mm3    RBC 7.49 (H) 4.14 - 5.80 10*6/mm3    Hemoglobin 14.8 13.0 - 17.7 g/dL    Hematocrit 53.5 (H) 37.5 - 51.0 %    MCV 71.4 (L) 79.0 - 97.0 fL    MCH 19.8 (L) 26.6 - 33.0 pg    MCHC 27.7 (L) 31.5 - 35.7 g/dL    RDW 21.7 (H) 12.3 - 15.4 %    RDW-SD 49.9 37.0 - 54.0 fl    MPV 9.5 6.0 - 12.0 fL    Platelets 721 (H) 140 - 450 10*3/mm3    nRBC 0.0 0.0 - 0.2 /100 WBC   Manual Differential    Collection Time: 01/28/25  1:27 PM    Specimen: Blood   Result Value Ref Range    Neutrophil % 89.9 (H) 42.7 - 76.0 %     Lymphocyte % 5.1 (L) 19.6 - 45.3 %    Monocyte % 3.0 (L) 5.0 - 12.0 %    Eosinophil % 2.0 0.3 - 6.2 %    Basophil % 0.0 0.0 - 1.5 %    Neutrophils Absolute 23.14 (H) 1.70 - 7.00 10*3/mm3    Lymphocytes Absolute 1.31 0.70 - 3.10 10*3/mm3    Monocytes Absolute 0.77 0.10 - 0.90 10*3/mm3    Eosinophils Absolute 0.51 (H) 0.00 - 0.40 10*3/mm3    Basophils Absolute 0.00 0.00 - 0.20 10*3/mm3    Anisocytosis Mod/2+ None Seen    WBC Morphology Normal Normal    Platelet Morphology Normal Normal   High Sensitivity Troponin T 1Hr    Collection Time: 01/28/25  2:32 PM    Specimen: Blood   Result Value Ref Range    HS Troponin T 38 (H) <22 ng/L    Troponin T Numeric Delta -2 ng/L    Troponin T % Delta -5 Abnormal if >/= 20%   Urinalysis With Microscopic If Indicated (No Culture) - Straight Cath    Collection Time: 01/28/25  4:23 PM    Specimen: Straight Cath; Urine   Result Value Ref Range    Color, UA Yellow Yellow, Straw    Appearance, UA Cloudy (A) Clear    pH, UA 6.0 5.0 - 8.0    Specific Gravity, UA 1.017 1.005 - 1.030    Glucose, UA Negative Negative    Ketones, UA Negative Negative    Bilirubin, UA Negative Negative    Blood, UA Negative Negative    Protein,  mg/dL (2+) (A) Negative    Leuk Esterase, UA Negative Negative    Nitrite, UA Negative Negative    Urobilinogen, UA 1.0 E.U./dL 0.2 - 1.0 E.U./dL   Urinalysis, Microscopic Only - Straight Cath    Collection Time: 01/28/25  4:23 PM    Specimen: Straight Cath; Urine   Result Value Ref Range    RBC, UA 0-2 None Seen, 0-2 /HPF    WBC, UA 3-5 (A) None Seen, 0-2 /HPF    Bacteria, UA None Seen None Seen /HPF    Squamous Epithelial Cells, UA 0-2 None Seen, 0-2 /HPF    Hyaline Casts, UA 3-6 None Seen /LPF    Methodology Manual Light Microscopy    Respiratory Panel PCR w/COVID-19(SARS-CoV-2) DONTRELL/LESTER/KANCHAN/PAD/COR/BRIAN In-House, NP Swab in UTM/VTM, 2 HR TAT - Swab, Nasopharynx    Collection Time: 01/28/25  4:33 PM    Specimen: Nasopharynx; Swab   Result Value Ref Range     ADENOVIRUS, PCR Not Detected Not Detected    Coronavirus 229E Not Detected Not Detected    Coronavirus HKU1 Not Detected Not Detected    Coronavirus NL63 Not Detected Not Detected    Coronavirus OC43 Not Detected Not Detected    COVID19 Not Detected Not Detected - Ref. Range    Human Metapneumovirus Not Detected Not Detected    Human Rhinovirus/Enterovirus Not Detected Not Detected    Influenza A PCR Not Detected Not Detected    Influenza B PCR Not Detected Not Detected    Parainfluenza Virus 1 Not Detected Not Detected    Parainfluenza Virus 2 Not Detected Not Detected    Parainfluenza Virus 3 Not Detected Not Detected    Parainfluenza Virus 4 Not Detected Not Detected    RSV, PCR Not Detected Not Detected    Bordetella pertussis pcr Not Detected Not Detected    Bordetella parapertussis PCR Not Detected Not Detected    Chlamydophila pneumoniae PCR Not Detected Not Detected    Mycoplasma pneumo by PCR Not Detected Not Detected       I ordered the above labs and reviewed the results.    RADIOLOGY  XR Chest 1 View    Result Date: 1/28/2025  XR CHEST 1 VW-  HISTORY: Male who is 79 years-old, weakness  TECHNIQUE: Frontal view of the chest  COMPARISON: 3/9/2024  FINDINGS: The heart size is normal. Aorta is calcified. Pulmonary vasculature is unremarkable. Old granulomatous disease is apparent. Sternotomy wires are noted. No focal pulmonary consolidation, pleural effusion, or pneumothorax. No acute osseous process.      No focal pulmonary consolidation. Follow-up as clinical indications persist.  This report was finalized on 1/28/2025 1:45 PM by Dr. Mukesh Bruce M.D on Workstation: [id number]       I ordered the above noted radiological studies. I reviewed the images and results. I agree with the radiologist interpretation.    PROCEDURES  Procedures    MEDICATIONS GIVEN IN ER  Medications - No data to display    PROGRESS, DATA ANALYSIS, CONSULTS, AND MEDICAL DECISION MAKING  A complete history and physical exam have  been performed.  All available laboratory and imaging results have been reviewed by myself prior to disposition.    MDM    After the initial H&P, I discussed pertinent information from history and physical exam with patient/family.  Discussed differential diagnosis.  Discussed plan for ED evaluation/workup/treatment.  All questions answered.  Patient/family is agreeable with plan.  ED Course as of 01/28/25 1827   Tue Jan 28, 2025   1250 My differential diagnosis for generalized weakness includes but is not limited to:  Neuromuscular weakness   CVA  Hemorrhagic stroke  Multiple sclerosis  Amyotrophic Lateral Sclerosis (ALS) (UMN & LMN)  Spinal and bulbar muscular atrophy (Mejia's syndrome)  Spinal cord disease: Infection (Epidural abscess)  Infarction/ischemia  Trauma (Spinal Cord Syndromes)  Inflammation (Transverse Myelitis)  Degenerative (Spinal muscular atrophy)  Tumor  Peripheral nerve disease: Guillain-[place] syndrome  Toxins ([place])  Tick paralysis  Diabetic peripheral neuropathy  NMJ disease: Myasthenia gravis crisis  Botulism  Organophosphate toxicity  Lambert-Eaton myasthenic syndrome  Rhabdomyolysis  Dermatomyositis  Polymyositis  Alcoholic myopathy  Non-neuromuscular weakness   ACS  Arrhythmia/Syncope  Severe infection/Sepsis  Hypoglycemia  Periodic paralysis (electrolyte disturbance, K, Mg, Ca)   Hypokalemic periodic paralysis  Thyrotoxic periodic paralysis  Respiratory failure  Symptomatic Anemia  Severe dehydration  Hypothyroidism  Polypharmacy  Malignancy           [JG]   1340 I reviewed chest x-ray in PACS, no pulmonary infiltrates per my read. [JG]   1425 Review of prior external notes (non-ED) -and- Review of prior external test results outside of this encounter:   I reviewed patient's oncology office visit note from the 21st of this month, patient followed for polycythemia vera, struggling with long COVID.  I reviewed patient's CBC from that time, white blood cell count of 33.01, hemoglobin  14.4, platelets 837. [JG]   1428 EKG independently viewed and contemporaneously interpreted by ED physician. Time: 1309.  Rate 69.  Interpretation: Normal sinus rhythm, left axis deviation, LVH, Q waves in V1 and V2, delayed R wave progression, no acute ST changes, single PVC present. [JG]   1618 Patient was extremely weak upon standing, required extensive assistance, was unstable for ambulation. [JG]   1746 ED workup unremarkable and reassuring.  Patient having progressive weakness and now having difficulty walking, patient not safe for discharge, consulting hospitalist for admission. [JG]   1758 Patient reassessed.  Discussed ED findings, differential diagnosis, and the need for admission for evaluation/treatment.  They are agreeable to admission and all questions were answered.     [JG]   1826 Phone call with MAGALI Ybarra.  Discussed the patient, relevant history, exam, diagnostics, ED findings/progress, and concerns. They agree to admit the patient to [facility] observation. Care assumed by the admitting physician at this time.     [JG]      ED Course User Index  [JG] Aditya Oswald MD       AS OF 18:27 EST VITALS:    BP - 149/67  HR - 61  TEMP - 98.9 °F (37.2 °C)  O2 SATS - 99%    DIAGNOSIS  Final diagnoses:   General weakness   Difficulty walking   Parkinson's disease, unspecified whether dyskinesia present, unspecified whether manifestations fluctuate   Viral URI with cough   Dysuria   Hyperglycemia   Elevated troponin   Polycythemia vera         DISPOSITION  ADMISSION    Discussed treatment plan and reason for admission with pt/family and admitting physician.  Pt/family voiced understanding of the plan for admission for further testing/treatment as needed.        Aditya Oswald MD  01/28/25 [id number]

## 2025-01-28 NOTE — ED NOTES
Nursing report ED to floor  Madhu Santoro  79 y.o.  male    HPI :  HPI  Stated Reason for Visit: generalized weakness and fatigue  History Obtained From: patient, EMS  Onset of Symptoms: constant  Duration (Days): 4    Chief Complaint  Chief Complaint   Patient presents with    Weakness - Generalized    Fatigue       Admitting doctor:   Angy Bond MD    Admitting diagnosis:   The primary encounter diagnosis was General weakness. Diagnoses of Difficulty walking, Parkinson's disease, unspecified whether dyskinesia present, unspecified whether manifestations fluctuate, Viral URI with cough, Dysuria, Hyperglycemia, Elevated troponin, and Polycythemia vera were also pertinent to this visit.    Code status:   Current Code Status       Date Active Code Status Order ID Comments User Context       1/28/2025 1842 No CPR (Do Not Attempt to Resuscitate) [id number]  Angy Bond MD ED        Question Answer    Code Status (Patient has no pulse and is not breathing) No CPR (Do Not Attempt to Resuscitate)    Medical Interventions (Patient has pulse or is breathing) Limited Support    Medical Intervention Limits: No intubation (DNI)                    Allergies:   Atorvastatin and Penicillins    Isolation:   No active isolations    Intake and Output  No intake or output data in the 24 hours ending 01/28/25 1852    Weight:   There were no vitals filed for this visit.    Most recent vitals:   Vitals:    01/28/25 1626 01/28/25 1700 01/28/25 1701 01/28/25 1809   BP:   149/67    Pulse: 59 61  61   Resp:       Temp:       SpO2: 98% 98%  99%       Active LDAs/IV Access:   Lines, Drains & Airways       Active LDAs       Name Placement date Placement time Site Days    Peripheral IV 01/28/25 1326 Right Antecubital 01/28/25  1326  Antecubital  less than 1    Gastrostomy/Enterostomy Gastrostomy 01/08/24  0700  present on admission  --  386                    Labs (abnormal labs have a star):   Labs Reviewed    COMPREHENSIVE METABOLIC PANEL - Abnormal; Notable for the following components:       Result Value    Glucose 136 (*)     CO2 30.7 (*)     Alkaline Phosphatase 150 (*)     All other components within normal limits    Narrative:     GFR Categories in Chronic Kidney Disease (CKD)      GFR Category          GFR (mL/min/1.73)    Interpretation  G1                     90 or greater         Normal or high (1)  G2                      60-89                Mild decrease (1)  G3a                   45-59                Mild to moderate decrease  G3b                   30-44                Moderate to severe decrease  G4                    15-29                Severe decrease  G5                    14 or less           Kidney failure          (1)In the absence of evidence of kidney disease, neither GFR category G1 or G2 fulfill the criteria for CKD.    eGFR calculation 2021 CKD-EPI creatinine equation, which does not include race as a factor   URINALYSIS W/ MICROSCOPIC IF INDICATED (NO CULTURE) - Abnormal; Notable for the following components:    Appearance, UA Cloudy (*)     Protein,  mg/dL (2+) (*)     All other components within normal limits   TROPONIN - Abnormal; Notable for the following components:    HS Troponin T 40 (*)     All other components within normal limits    Narrative:     High Sensitive Troponin T Reference Range:  <14.0 ng/L- Negative Female for AMI  <22.0 ng/L- Negative Male for AMI  >=14 - Abnormal Female indicating possible myocardial injury.  >=22 - Abnormal Male indicating possible myocardial injury.   Clinicians would have to utilize clinical acumen, EKG, Troponin, and serial changes to determine if it is an Acute Myocardial Infarction or myocardial injury due to an underlying chronic condition.        CBC WITH AUTO DIFFERENTIAL - Abnormal; Notable for the following components:    WBC 25.74 (*)     RBC 7.49 (*)     Hematocrit 53.5 (*)     MCV 71.4 (*)     MCH 19.8 (*)     MCHC 27.7 (*)     RDW  21.7 (*)     Platelets 721 (*)     All other components within normal limits   MANUAL DIFFERENTIAL - Abnormal; Notable for the following components:    Neutrophil % 89.9 (*)     Lymphocyte % 5.1 (*)     Monocyte % 3.0 (*)     Neutrophils Absolute 23.14 (*)     Eosinophils Absolute 0.51 (*)     All other components within normal limits   HIGH SENSITIVITIY TROPONIN T 1HR - Abnormal; Notable for the following components:    HS Troponin T 38 (*)     All other components within normal limits    Narrative:     High Sensitive Troponin T Reference Range:  <14.0 ng/L- Negative Female for AMI  <22.0 ng/L- Negative Male for AMI  >=14 - Abnormal Female indicating possible myocardial injury.  >=22 - Abnormal Male indicating possible myocardial injury.   Clinicians would have to utilize clinical acumen, EKG, Troponin, and serial changes to determine if it is an Acute Myocardial Infarction or myocardial injury due to an underlying chronic condition.        URINALYSIS, MICROSCOPIC ONLY - Abnormal; Notable for the following components:    WBC, UA 3-5 (*)     All other components within normal limits   RESPIRATORY PANEL PCR W/ COVID-19 (SARS-COV-2), NP SWAB IN UTM/VTP, 2 HR TAT - Normal    Narrative:     In the setting of a positive respiratory panel with a viral infection PLUS a negative procalcitonin without other underlying concern for bacterial infection, consider observing off antibiotics or discontinuation of antibiotics and continue supportive care. If the respiratory panel is positive for atypical bacterial infection (Bordetella pertussis, Chlamydophila pneumoniae, or Mycoplasma pneumoniae), consider antibiotic de-escalation to target atypical bacterial infection.   BNP (IN-HOUSE) - Normal    Narrative:     This assay is used as an aid in the diagnosis of individuals suspected of having heart failure. It can be used as an aid in the diagnosis of acute decompensated heart failure (ADHF) in patients presenting with signs and  symptoms of ADHF to the emergency department (ED). In addition, NT-proBNP of <300 pg/mL indicates ADHF is not likely.    Age Range Result Interpretation  NT-proBNP Concentration (pg/mL:      <50             Positive            >450                   Gray                 300-450                    Negative             <300    50-75           Positive            >900                  Gray                300-900                  Negative            <300      >75             Positive            >1800                  Gray                300-1800                  Negative            <300   T4, FREE - Normal   TSH - Normal   MAGNESIUM - Normal   CBC AND DIFFERENTIAL    Narrative:     The following orders were created for panel order CBC & Differential.  Procedure                               Abnormality         Status                     ---------                               -----------         ------                     CBC Auto Differential[[id number]]        Abnormal            Final result                 Please view results for these tests on the individual orders.       EKG:   ECG 12 Lead Other; wkness   Final Result   HEART RATE=69  bpm   RR Aksfzmqq=605  ms   NE Jnqkrxgb=821  ms   P Horizontal Axis=33  deg   P Front Axis=82  deg   QRSD [place]=385  ms   QT Qqqxikcs=850  ms   DGmN=005  ms   QRS Axis=-37  deg   T Wave Axis=43  deg   - ABNORMAL ECG -   Sinus rhythm   Multiple premature complexes, vent &amp; supraven - new   Left ventricular hypertrophy   Anterior  Q waves, possibly due to LVH   Nonspecific  T abnormalities, lateral leads   Electronically Signed By: Marjorie Healy ([facility]) 2025-01-28 16:55:00   Date and Time of Study:2025-01-28 13:09:58          Meds given in ED:   Medications   acetaminophen (TYLENOL) tablet 650 mg (has no administration in time range)     Or   acetaminophen (TYLENOL) 160 MG/5ML oral solution 650 mg (has no administration in time range)     Or   acetaminophen (TYLENOL) suppository  650 mg (has no administration in time range)   ondansetron (ZOFRAN) injection 4 mg (has no administration in time range)   sennosides-docusate (PERICOLACE) 8.6-50 MG per tablet 2 tablet (has no administration in time range)     And   polyethylene glycol (MIRALAX) packet 17 g (has no administration in time range)     And   bisacodyl (DULCOLAX) EC tablet 5 mg (has no administration in time range)     And   bisacodyl (DULCOLAX) suppository 10 mg (has no administration in time range)       Imaging results:  XR Chest 1 View    Result Date: 2025  No focal pulmonary consolidation. Follow-up as clinical indications persist.  This report was finalized on 2025 1:45 PM by Dr. Mukesh Bruce M.D on Workstation: [id number]       Ambulatory status:   - 2 assist    Social issues:   Social History     Socioeconomic History    Marital status:      Spouse name: Brooke    Years of education: 9th grade   Tobacco Use    Smoking status: Former     Current packs/day: 0.00     Average packs/day: 1 pack/day for 50.0 years (50.0 ttl pk-yrs)     Types: Cigarettes     Start date: 1959     Quit date: 2009     Years since quittin.0     Passive exposure: Past    Smokeless tobacco: Never    Tobacco comments:     CAFFEINE USE   Vaping Use    Vaping status: Never Used   Substance and Sexual Activity    Alcohol use: Not Currently     Comment: rarely    Drug use: No    Sexual activity: Defer     Partners: Female       Peripheral Neurovascular  Peripheral Neurovascular (Adult)  Peripheral Neurovascular WDL: WDL    Neuro Cognitive  Neuro Cognitive (Adult)  Cognitive/Neuro/Behavioral WDL: WDL    Learning       Respiratory  Respiratory WDL  Respiratory WDL: WDL    Abdominal Pain       Pain Assessments  Pain (Adult)  (0-10) Pain Rating: Rest: 0  (0-10) Pain Rating: Activity: 0    NIH Stroke Scale       Alexander Willis RN  25 18:52 EST

## 2025-01-28 NOTE — ED NOTES
Pt to ED from home due to generalized weakness x 4 days. EMS stated pt did require max assistance ambulating due to weakness. Pt does use a walker, Hx of parkinson.    Pt denies upper resp. Symptoms.

## 2025-01-29 PROBLEM — R53.1 GENERALIZED WEAKNESS: Status: ACTIVE | Noted: 2025-01-29

## 2025-01-29 LAB
ANION GAP SERPL CALCULATED.3IONS-SCNC: 9 MMOL/L (ref 5–15)
BASOPHILS # BLD AUTO: 0.12 10*3/MM3 (ref 0–0.2)
BASOPHILS NFR BLD AUTO: 0.5 % (ref 0–1.5)
BUN SERPL-MCNC: 16 MG/DL (ref 8–23)
BUN/CREAT SERPL: 15.2 (ref 7–25)
CALCIUM SPEC-SCNC: 9.3 MG/DL (ref 8.6–10.5)
CHLORIDE SERPL-SCNC: 106 MMOL/L (ref 98–107)
CO2 SERPL-SCNC: 23 MMOL/L (ref 22–29)
CREAT SERPL-MCNC: 1.05 MG/DL (ref 0.76–1.27)
DEPRECATED RDW RBC AUTO: 51.1 FL (ref 37–54)
EGFRCR SERPLBLD CKD-EPI 2021: 71.8 ML/MIN/1.73
EOSINOPHIL # BLD AUTO: 0.34 10*3/MM3 (ref 0–0.4)
EOSINOPHIL NFR BLD AUTO: 1.3 % (ref 0.3–6.2)
ERYTHROCYTE [DISTWIDTH] IN BLOOD BY AUTOMATED COUNT: 21.6 % (ref 12.3–15.4)
GLUCOSE SERPL-MCNC: 95 MG/DL (ref 65–99)
HCT VFR BLD AUTO: 50.8 % (ref 37.5–51)
HGB BLD-MCNC: 13.8 G/DL (ref 13–17.7)
IMM GRANULOCYTES # BLD AUTO: 0.37 10*3/MM3 (ref 0–0.05)
IMM GRANULOCYTES NFR BLD AUTO: 1.4 % (ref 0–0.5)
LYMPHOCYTES # BLD AUTO: 1.09 10*3/MM3 (ref 0.7–3.1)
LYMPHOCYTES NFR BLD AUTO: 4.1 % (ref 19.6–45.3)
MCH RBC QN AUTO: 19.7 PG (ref 26.6–33)
MCHC RBC AUTO-ENTMCNC: 27.2 G/DL (ref 31.5–35.7)
MCV RBC AUTO: 72.5 FL (ref 79–97)
MONOCYTES # BLD AUTO: 1.19 10*3/MM3 (ref 0.1–0.9)
MONOCYTES NFR BLD AUTO: 4.5 % (ref 5–12)
NEUTROPHILS NFR BLD AUTO: 23.29 10*3/MM3 (ref 1.7–7)
NEUTROPHILS NFR BLD AUTO: 88.2 % (ref 42.7–76)
PLATELET # BLD AUTO: 625 10*3/MM3 (ref 140–450)
PMV BLD AUTO: 9.2 FL (ref 6–12)
POTASSIUM SERPL-SCNC: 4.7 MMOL/L (ref 3.5–5.2)
RBC # BLD AUTO: 7.01 10*6/MM3 (ref 4.14–5.8)
SODIUM SERPL-SCNC: 138 MMOL/L (ref 136–145)
VIT B12 BLD-MCNC: 882 PG/ML (ref 211–946)
WBC NRBC COR # BLD AUTO: 26.4 10*3/MM3 (ref 3.4–10.8)

## 2025-01-29 PROCEDURE — 36415 COLL VENOUS BLD VENIPUNCTURE: CPT | Performed by: INTERNAL MEDICINE

## 2025-01-29 PROCEDURE — 85025 COMPLETE CBC W/AUTO DIFF WBC: CPT | Performed by: INTERNAL MEDICINE

## 2025-01-29 PROCEDURE — 92610 EVALUATE SWALLOWING FUNCTION: CPT | Performed by: SPEECH-LANGUAGE PATHOLOGIST

## 2025-01-29 PROCEDURE — 82607 VITAMIN B-12: CPT | Performed by: NURSE PRACTITIONER

## 2025-01-29 PROCEDURE — 99222 1ST HOSP IP/OBS MODERATE 55: CPT | Performed by: NURSE PRACTITIONER

## 2025-01-29 PROCEDURE — 80048 BASIC METABOLIC PNL TOTAL CA: CPT | Performed by: INTERNAL MEDICINE

## 2025-01-29 PROCEDURE — 97161 PT EVAL LOW COMPLEX 20 MIN: CPT

## 2025-01-29 PROCEDURE — 97530 THERAPEUTIC ACTIVITIES: CPT

## 2025-01-29 PROCEDURE — 25810000003 SODIUM CHLORIDE 0.9 % SOLUTION: Performed by: INTERNAL MEDICINE

## 2025-01-29 PROCEDURE — 97166 OT EVAL MOD COMPLEX 45 MIN: CPT

## 2025-01-29 RX ORDER — CARBIDOPA/LEVODOPA 25MG-250MG
1 TABLET ORAL 4 TIMES DAILY
Status: DISCONTINUED | OUTPATIENT
Start: 2025-01-29 | End: 2025-02-02 | Stop reason: HOSPADM

## 2025-01-29 RX ORDER — CARBIDOPA/LEVODOPA 25MG-250MG
1 TABLET ORAL ONCE
Status: COMPLETED | OUTPATIENT
Start: 2025-01-29 | End: 2025-01-29

## 2025-01-29 RX ADMIN — CARBIDOPA AND LEVODOPA 1 TABLET: 25; 250 TABLET ORAL at 09:48

## 2025-01-29 RX ADMIN — SODIUM CHLORIDE 75 ML/HR: 9 INJECTION, SOLUTION INTRAVENOUS at 00:43

## 2025-01-29 RX ADMIN — APIXABAN 2.5 MG: 2.5 TABLET, FILM COATED ORAL at 00:43

## 2025-01-29 RX ADMIN — ROSUVASTATIN CALCIUM 20 MG: 20 TABLET, FILM COATED ORAL at 00:44

## 2025-01-29 RX ADMIN — APIXABAN 2.5 MG: 2.5 TABLET, FILM COATED ORAL at 09:52

## 2025-01-29 RX ADMIN — CARBIDOPA AND LEVODOPA 1 TABLET: 25; 250 TABLET ORAL at 21:55

## 2025-01-29 RX ADMIN — SODIUM CHLORIDE 75 ML/HR: 9 INJECTION, SOLUTION INTRAVENOUS at 13:57

## 2025-01-29 RX ADMIN — CARBIDOPA AND LEVODOPA 1 TABLET: 25; 250 TABLET ORAL at 15:46

## 2025-01-29 RX ADMIN — HYDROCODONE BITARTRATE AND ACETAMINOPHEN 1 TABLET: 5; 325 TABLET ORAL at 00:46

## 2025-01-29 RX ADMIN — PANTOPRAZOLE SODIUM 40 MG: 40 TABLET, DELAYED RELEASE ORAL at 04:50

## 2025-01-29 RX ADMIN — APIXABAN 2.5 MG: 2.5 TABLET, FILM COATED ORAL at 21:55

## 2025-01-29 RX ADMIN — LISINOPRIL 5 MG: 5 TABLET ORAL at 09:47

## 2025-01-29 RX ADMIN — HYDROXYUREA 500 MG: 500 CAPSULE ORAL at 09:48

## 2025-01-29 RX ADMIN — HYDROCODONE BITARTRATE AND ACETAMINOPHEN 1 TABLET: 5; 325 TABLET ORAL at 13:49

## 2025-01-29 RX ADMIN — HYDROCODONE BITARTRATE AND ACETAMINOPHEN 1 TABLET: 5; 325 TABLET ORAL at 21:55

## 2025-01-29 RX ADMIN — ROSUVASTATIN CALCIUM 20 MG: 20 TABLET, FILM COATED ORAL at 22:00

## 2025-01-29 RX ADMIN — ALLOPURINOL 300 MG: 300 TABLET ORAL at 09:47

## 2025-01-29 NOTE — PROGRESS NOTES
Name: Madhu Santoro ADMIT: 2025   : 1945  PCP: Damian Sanchez MD    MRN: [id number] LOS: 0 days   AGE/SEX: 79 y.o. male  ROOM: [facility]     Subjective   Subjective   Resting in his recliner, patient is complaining of weakness which has been worsening, difficulties with gait/initiation of gait.        Objective   Objective   Vital Signs  Temp:  [97 °F (36.1 °C)-98.2 °F (36.8 °C)] 97 °F (36.1 °C)  Heart Rate:  [61-88] 63  Resp:  [16-20] 20  BP: (136-174)/(56-73) 137/56  SpO2:  [93 %-99 %] 93 %  on   ;   Device (Oxygen Therapy): room air  Body mass index is 21.12 kg/m².  Physical Exam  Constitutional:       General: He is not in acute distress.     Appearance: He is ill-appearing. He is not toxic-appearing.   Cardiovascular:      Rate and Rhythm: Normal rate and regular rhythm.   Pulmonary:      Effort: Pulmonary effort is normal. No respiratory distress.      Breath sounds: Normal breath sounds. No wheezing.   Abdominal:      General: Abdomen is flat.      Palpations: Abdomen is soft.      Tenderness: There is no abdominal tenderness.   Musculoskeletal:         General: No tenderness.      Right lower leg: No edema.      Left lower leg: No edema.   Skin:     General: Skin is warm and dry.   Neurological:      Mental Status: He is alert and oriented to person, place, and time. Mental status is at baseline.      Motor: Weakness present.      Comments: 5/5 UE, 4/5 LE; rigidity of RUE; bradykinesis         Results Review     I reviewed the patient's new clinical results.  Results from last 7 days   Lab Units 25  0648 25  1327   WBC 10*3/mm3 26.40* 25.74*   HEMOGLOBIN g/dL 13.8 14.8   PLATELETS 10*3/mm3 625* 721*     Results from last 7 days   Lab Units 25  0648 25  1327   SODIUM mmol/L 138 138   POTASSIUM mmol/L 4.7 4.0   CHLORIDE mmol/L 106 101   CO2 mmol/L 23.0 30.7*   BUN mg/dL 16 18   CREATININE mg/dL 1.05 1.19   GLUCOSE mg/dL 95 136*   Estimated Creatinine Clearance: 50  "mL/min (by C-G formula based on SCr of 1.05 mg/dL).  Results from last 7 days   Lab Units 01/28/25  1327   ALBUMIN g/dL 4.1   BILIRUBIN mg/dL 0.9   ALK PHOS U/L 150*   AST (SGOT) U/L 26   ALT (SGPT) U/L 8     Results from last 7 days   Lab Units 01/29/25  0648 01/28/25  1327   CALCIUM mg/dL 9.3 9.9   ALBUMIN g/dL  --  4.1   MAGNESIUM mg/dL  --  2.3       COVID19   Date Value Ref Range Status   01/28/2025 Not Detected Not Detected - Ref. Range Final   01/07/2024 Not Detected Not Detected - Ref. Range Final     No results found for: \"HGBA1C\", \"POCGLU\"    XR Chest 1 View  Narrative: XR CHEST 1 VW-     HISTORY: Male who is 79 years-old, weakness     TECHNIQUE: Frontal view of the chest     COMPARISON: 3/9/2024     FINDINGS: The heart size is normal. Aorta is calcified. Pulmonary  vasculature is unremarkable. Old granulomatous disease is apparent.  Sternotomy wires are noted. No focal pulmonary consolidation, pleural  effusion, or pneumothorax. No acute osseous process.     Impression: No focal pulmonary consolidation. Follow-up as clinical  indications persist.     This report was finalized on 1/28/2025 1:45 PM by Dr. Mukesh Bruce M.D on Workstation: [id number]       Scheduled Medications  allopurinol, 300 mg, Oral, Daily  apixaban, 2.5 mg, Oral, Q12H  carbidopa-levodopa, 1 tablet, Oral, 4x Daily  hydroxyurea, 500 mg, Oral, Daily  lisinopril, 5 mg, Oral, Daily  pantoprazole, 40 mg, Oral, Q AM  rosuvastatin, 20 mg, Oral, Nightly    Infusions  sodium chloride, 75 mL/hr, Last Rate: 75 mL/hr (01/29/25 1357)    Diet  Diet: Regular/House; No Mixed Consistencies; Texture: Soft to Chew (NDD 3); Soft to Chew: Ground Meat; Fluid Consistency: Nectar Thick         I have personally reviewed:  [x]  Laboratory   []  Microbiology   []  Radiology   [x]  EKG/Telemetry   []  Cardiology/Vascular   []  Pathology   []  Records    Assessment/Plan     Active Hospital Problems    Diagnosis  POA    **General weakness [R53.1]  Yes    " Generalized weakness [R53.1]  Yes    History of CVA (cerebrovascular accident) [Z86.73]  Not Applicable    Gout [M10.9]  Yes    Polycythemia vera [D45]  Yes    Polycythemia [D75.1]  Yes    PAD (peripheral artery disease) [I73.9]  Yes    S/P CABG (coronary artery bypass graft) [Z95.1]  Not Applicable    Benign essential hypertension [I10]  Yes    DDD (degenerative disc disease), lumbosacral [M51.379]  Yes    Anxiety [F41.9]  Yes    HLD (hyperlipidemia) [E78.5]  Yes      Resolved Hospital Problems   No resolved problems to display.       79 y.o. male admitted with General weakness.    Weakness, progressive  Parkinson's disease  Peripheral neuropathy  - d/w neurology- patient likely being undertreated for Parkinson's- increased dose recommended- patient's sinemet has been increased to QID  - TSH wnl; B12 ok, per neuro- considering addition of  Comtan pending clinical course     CAD s/p CABG  Atrial flutter s/p ablation, on Eliquis   HTN  HLD  - cont home Eliquis, lisinopril    Hx of CVA  Carotid vascular disease  - cont home statin/eliquis    GERD  Dysphagia  - PPI  - complained of difficulty swallowing to RN- SLP to evaluate    Polycythemia vera/JAK2 mutation/essential thrombocythemia   - f/w CBC group  - on hydrea    Lumbar degenerative disc disease    Gout  - allopurinol    SCDs for DVT prophylaxis.  DNR.  Discussed with patient, nursing staff, and consulting provider.  Anticipated discharge Pending PT and/or OT eval., TBD        Jewell Rios MD  [facility]  01/29/25  17:24 EST    I wore protective equipment throughout this patient encounter including gloves.  Hand hygiene was performed before donning protective equipment and after removal when leaving the room.         Copied text in this note has been reviewed and is accurate as of 01/29/25.

## 2025-01-29 NOTE — THERAPY EVALUATION
Patient Name: Madhu Santoro  : 1945    MRN: [id number]                              Today's Date: 2025       Admit Date: 2025    Visit Dx:     ICD-10-CM ICD-9-CM   1. General weakness  R53.1 780.79   2. Difficulty walking  R26.2 719.7   3. Parkinson's disease, unspecified whether dyskinesia present, unspecified whether manifestations fluctuate  G20.A1 332.0   4. Viral URI with cough  J06.9 465.9   5. Dysuria  R30.0 788.1   6. Hyperglycemia  R73.9 790.29   7. Elevated troponin  R79.89 790.6   8. Polycythemia vera  D45 238.4     Patient Active Problem List   Diagnosis    Anxiety    Arthritis    Coronary artery disease due to lipid rich plaque    HLD (hyperlipidemia)    Benign essential hypertension    DDD (degenerative disc disease), lumbosacral    Cerebrovascular accident    Encounter for screening for cardiovascular disorders    Gastroesophageal reflux disease    Hyperlipidemia    Stenosis of carotid artery    Former smoker    History of cardiac catheterization    S/P ablation of atrial flutter    Typical atrial flutter    S/P CABG (coronary artery bypass graft)    Adenomatous polyp of ascending colon    Abdominal bloating    Stroke    PAD (peripheral artery disease)    Acute cholecystitis    Right upper quadrant abdominal pain    Chronic pain of both hips    Chronic bilateral low back pain without sciatica    Sacroiliac joint dysfunction of both sides    Encounter for long-term (current) use of high-risk medication    Lumbar facet arthropathy    Stroke-like symptoms    CVA (cerebral vascular accident)    Personal history of colonic polyps    Arthralgia of multiple joints    Iron deficiency    Thrombocytosis    Left ureteral stone    Obstructive uropathy    Chronic anticoagulation    Facial skin lesion    Iron deficiency anemia    Polycythemia    Neuropathy    Weakness    Pneumonia    Close exposure to COVID-19 virus    Polycythemia vera    Gout    COVID-19    Cytokine release syndrome, grade 2  "   Anemia    History of CVA (cerebrovascular accident)    S/P percutaneous endoscopic gastrostomy (PEG) tube placement    Mandel esophagus    Sacral decubitus ulcer    Oral phase dysphagia    Acute blood loss anemia    Orthostatic dizziness    Orthostatic hypotension    Bilateral leg weakness    General weakness     Past Medical History:   Diagnosis Date    Anxiety     Arthritis     Atrial flutter     Status post cavotricuspid isthmus ablation by Dr. Gustafson on 4/18/17    Mandel esophagus     Benign essential hypertension     CAD (coronary artery disease)     3 vessel CABG 4/11/17 by Dr. Cortez: ROSARIO-prox LAD, SVG-OM1, SVG-OM3    Carotid artery disease     Status post carotid endarterectomy - USG 4/10/17: 50-59% NICHELLE, 1-15% LICA.     Colonic polyp     COVID-19 long hauler     Cyst of pancreas     DDD (degenerative disc disease), lumbosacral     GERD (gastroesophageal reflux disease)     H/O bone density study 2013    H/O complete eye exam 2014    History of kidney stone     HLD (hyperlipidemia)     Hypertension     Kidney stone     8/22/22    Lipid screening 05/31/2013    Low back pain     [facility]    Parkinson disease     Screening for prostate cancer 07/07/2015    Skin cancer     nose    Stroke     RESIDUAL--\"BALANCE ISSUES\"     Past Surgical History:   Procedure Laterality Date    CARDIAC CATHETERIZATION N/A 04/10/2017    Procedure: Left Heart Cath;  Surgeon: Marjorie Healy MD;  Location:  [facility] CATH INVASIVE LOCATION;  Service:     CARDIAC CATHETERIZATION N/A 04/10/2017    Procedure: Coronary angiography;  Surgeon: Marjorie Healy MD;  Location:  [facility];  Service:     CARDIAC CATHETERIZATION N/A 04/10/2017    Procedure: Left ventriculography;  Surgeon: Marjorie Healy MD;  Location:  [facility];  Service:     CARDIAC CATHETERIZATION  2011    CARDIAC ELECTROPHYSIOLOGY PROCEDURE N/A 04/18/2017    Procedure: Ablation atrial flutter;  " Surgeon: Jose Antonio Gustafson MD;  Location: [facility] CATH INVASIVE LOCATION;  Service:     CAROTID ENDARTERECTOMY      CHOLECYSTECTOMY      CHOLECYSTECTOMY WITH INTRAOPERATIVE CHOLANGIOGRAM N/A 09/07/2019    Procedure: Laparoscopic cholecystectomy with intraoperative cholangiogram;  Surgeon: Gauri Travis MD;  Location: [facility] MAIN OR;  Service: General    COLONOSCOPY  01/06/2015    Diverticulosis, one TA    COLONOSCOPY N/A 02/14/2019    tics, NBIH, adenomatous polyp x 2    COLONOSCOPY N/A 11/05/2021    Procedure: COLONOSCOPY TO CECUM AND TERM. ILEUM WITH COLD POLYPECTOMIES;  Surgeon: Everton Abel MD;  Location: [facility] ENDOSCOPY;  Service: Gastroenterology;  Laterality: N/A;  PRE OP - PERS H/O POLYPS  POST OP - COLON POLYPS,, DIVERTICULOSIS, HEMORRHOIDS    CORONARY ARTERY BYPASS GRAFT N/A 04/11/2017    Procedure: AR STERNOTOMY CORONARY ARTERY BYPASS GRAFT TIMES 3 USING LEFT INTERNAL MAMMARY ARTERY AND LEFT GREATER SAPHENOUS VEIN GRAFT PER ENDOSCOPIC VEIN HARVESTING AND PRP ;  Surgeon: Temo Cortez MD;  Location: [facility] MAIN OR;  Service:     ENDOSCOPY  01/06/2015    HH, Ervin's esophagus    ENDOSCOPY N/A 02/14/2019    Z line irregular, HH, Ervin's esophagus    ENDOSCOPY N/A 11/05/2021    Procedure: ESOPHAGOGASTRODUODENOSCOPY WITH BIOPSIES;  Surgeon: Everton Abel MD;  Location: [facility] ENDOSCOPY;  Service: Gastroenterology;  Laterality: N/A;  PRE OP - PERS H/O ERVIN'S  POST OP - IRREG Z LINE    ENDOSCOPY W/ PEG TUBE PLACEMENT N/A 11/6/2023    Procedure: ESOPHAGOGASTRODUODENOSCOPY WITH PERCUTANEOUS ENDOSCOPIC GASTROSTOMY TUBE INSERTION;  Surgeon: Temo Ramsey MD;  Location: [facility] ENDOSCOPY;  Service: General;  Laterality: N/A;  Pre: dysphagia  Post: same    KNEE SURGERY Left     URETEROSCOPY LASER LITHOTRIPSY WITH STENT INSERTION Left 8/23/2022    Procedure: Cysto retrograde with left uretro stent placement;  Surgeon: Damien Oliveira MD;  Location: [facility] OR;  Service:  Urology;  Laterality: Left;    VASECTOMY        General Information       Row Name 01/29/25 1250          OT Time and Intention    Document Type evaluation  -MM     Mode of Treatment occupational therapy  -MM     Patient Effort good  -MM       Row Name 01/29/25 1250          General Information    Patient Profile Reviewed yes  -MM     Prior Level of Function independent:  mod (I) with ADLs, uses rwx, owns cane  -MM     Existing Precautions/Restrictions fall  -MM     Barriers to Rehab previous functional deficit  hx Parkinsons  -MM       Row Name 01/29/25 1250          Living Environment    People in Home spouse  -MM       Row Name 01/29/25 1250          Cognition    Orientation Status (Cognition) oriented x 4  -MM       Row Name 01/29/25 1250          Safety Issues/Impairments Affecting Functional Mobility    Safety Issues Affecting Function (Mobility) safety precaution awareness  -MM     Impairments Affecting Function (Mobility) balance;endurance/activity tolerance;strength  -MM               User Key  (r) = Recorded By, (t) = Taken By, (c) = Cosigned By      Initials Name Provider Type    MM Gris Cuello OT Occupational Therapist                     Mobility/ADL's       Row Name 01/29/25 1250          Bed Mobility    Bed Mobility supine-sit  -MM     Supine-Sit [employer] (Bed Mobility) minimum assist (75% patient effort);verbal cues  -MM     Assistive Device (Bed Mobility) head of bed elevated;bed rails  -MM     Comment, (Bed Mobility) [facility] end of session  -MM       Row Name 01/29/25 1250          Transfers    Transfers sit-stand transfer;stand-sit transfer  -MM       Row Name 01/29/25 1250          Sit-Stand Transfer    Sit-Stand [employer] (Transfers) contact guard;verbal cues  -MM     Assistive Device (Sit-Stand Transfers) walker, front-wheeled  -MM       Row Name 01/29/25 1250          Stand-Sit Transfer    Stand-Sit [employer] (Transfers) contact guard  -MM     Assistive Device (Stand-Sit Transfers)  walker, front-wheeled  -[facility] Name 01/29/25 1250          Functional Mobility    Functional Mobility- Ind. Level contact guard assist  -MM     Functional Mobility- Comment short distance to chair, fatigues quickly with activity, declined further mobility  -[facility] Name 01/29/25 1250          Activities of Daily Living    BADL Assessment/Intervention lower body dressing;feeding  -MM       Row Name 01/29/25 1250          Lower Body Dressing Assessment/Training    [place] Level (Lower Body Dressing) don;socks;minimum assist (75% patient effort)  -[facility] Name 01/29/25 1250          Self-Feeding Assessment/Training    Comment, (Feeding) tray set up once in chair  -               User Key  (r) = Recorded By, (t) = Taken By, (c) = Cosigned By      Initials Name Provider Type    Gris Lugo OT Occupational Therapist                   Obj/Interventions       [facility] Name 01/29/25 1252          Sensory Assessment (Somatosensory)    Sensory Assessment (Somatosensory) UE sensation intact  -MM       Row Name 01/29/25 1252          Vision Assessment/Intervention    Visual Impairment/Limitations WFL  -[facility] Name 01/29/25 1252          Range of Motion Comprehensive    General Range of Motion bilateral upper extremity ROM WNL  -[facility] Name 01/29/25 1252          Strength Comprehensive (MMT)    General Manual Muscle Testing (MMT) Assessment upper extremity strength deficits identified  -     Comment, General Manual Muscle Testing (MMT) Assessment generalized UE weakness, no focal deficits noted  -[facility] Name 01/29/25 1252          Motor Skills    Motor Skills functional endurance  -     Functional Endurance quick to fatigue  -MM       Row Name 01/29/25 1252          Balance    Balance Assessment sitting static balance;sitting dynamic balance;sit to stand dynamic balance;standing static balance;standing dynamic balance  -MM     Static Sitting Balance standby assist  -MM     Dynamic  Sitting Balance standby assist  -MM     Position, Sitting Balance sitting edge of bed;unsupported  -MM     Sit to Stand Dynamic Balance contact guard  -MM     Static Standing Balance contact guard  -MM     Dynamic Standing Balance contact guard  -MM     Position/Device Used, Standing Balance supported;walker, front-wheeled  -MM     Balance Interventions sitting;standing;sit to stand;supported;static;dynamic;minimal challenge;weight shifting activity;occupation based/functional task  -MM     Comment, Balance no overt LOBs  -MM               User Key  (r) = Recorded By, (t) = Taken By, (c) = Cosigned By      Initials Name Provider Type    MM Gris Cuello OT Occupational Therapist                   Goals/Plan       Row Name 01/29/25 1255          Transfer Goal 1 (OT)    Activity/Assistive Device (Transfer Goal 1, OT) sit-to-stand/stand-to-sit;bed-to-chair/chair-to-bed;toilet  -MM     [place] Level/Cues Needed (Transfer Goal 1, OT) modified independence  -MM     Time Frame (Transfer Goal 1, OT) short term goal (STG);2 weeks  -MM     Progress/Outcome (Transfer Goal 1, OT) goal ongoing  -MM       Row Name 01/29/25 1255          Bathing Goal 1 (OT)    Activity/Device (Bathing Goal 1, OT) bathing skills, all  -MM     [place] Level/Cues Needed (Bathing Goal 1, OT) standby assist  -MM     Time Frame (Bathing Goal 1, OT) short term goal (STG);2 weeks  -MM     Progress/Outcomes (Bathing Goal 1, OT) goal ongoing  -MM       Row Name 01/29/25 1255          Dressing Goal 1 (OT)    Activity/Device (Dressing Goal 1, OT) lower body dressing  -MM     [place]/Cues Needed (Dressing Goal 1, OT) modified independence  -MM     Time Frame (Dressing Goal 1, OT) short term goal (STG);2 weeks  -MM     Progress/Outcome (Dressing Goal 1, OT) goal ongoing  -MM       Row Name 01/29/25 1255          Grooming Goal 1 (OT)    Activity/Device (Grooming Goal 1, OT) grooming skills, all  -MM     [place] (Grooming Goal 1, OT) modified  independence  -MM     Time Frame (Grooming Goal 1, OT) short term goal (STG);2 weeks  -MM     Progress/Outcome (Grooming Goal 1, OT) goal ongoing  -MM       Row Name 01/29/25 1255          Therapy Assessment/Plan (OT)    Planned Therapy Interventions (OT) activity tolerance training;neuromuscular control/coordination retraining;patient/caregiver education/training;transfer/mobility retraining;strengthening exercise;ROM/therapeutic exercise;occupation/activity based interventions;functional balance retraining  -MM               User Key  (r) = Recorded By, (t) = Taken By, (c) = Cosigned By      Initials Name Provider Type    Gris Lugo OT Occupational Therapist                   Clinical Impression       Row Name 01/29/25 1252          Pain Assessment    Pretreatment Pain Rating 0/10 - no pain  -MM     Posttreatment Pain Rating 0/10 - no pain  -MM       Row Name 01/29/25 1252          Plan of Care Review    Plan of Care Reviewed With patient  -MM     Progress no change  -MM     Outcome Evaluation Pt is an 78 yo male admitted with generalized weakness. he has hx Parkinson's disease. He is seen this date for OT ARMANDO shane&Silvia3, reports he lives at home with his spouse, mod (I) with ADLs, uses rwx for most functional mobility. Pt reports multiple falls over last week. He presents with decreased activity tolerance and generalized weakness required for optimal (I) with ADLs. He requires min A x1 for bed mobility, CGA for STS and functional transfers using rwx. He is able to tolerate short distance to chair before fatigue. He will continue to benefit from skilled OT to maximize (I) prior to d/c to ensure safety upon return home and decrease risk of falls. He plans home, rec HHOT to f/u.  -MM       Row Name 01/29/25 1257          Therapy Assessment/Plan (OT)    Rehab Potential (OT) good  -MM     Criteria for Skilled Therapeutic Interventions Met (OT) yes;meets criteria;skilled treatment is necessary  -MM     Therapy  Frequency (OT) 5 times/wk  -MM       Row Name 01/29/25 1252          Therapy Plan Review/Discharge Plan (OT)    Anticipated Discharge Disposition (OT) home with assist;home with home health  -MM       Row Name 01/29/25 1252          Vital Signs    O2 Delivery Pre Treatment supplemental O2  -MM     O2 Delivery Intra Treatment room air  -MM     O2 Delivery Post Treatment room air  -MM       Row Name 01/29/25 1252          Positioning and Restraints    Pre-Treatment Position in bed  -MM     Post Treatment Position chair  -MM     In Chair notified nsg;reclined;call light within reach;encouraged to call for assist;exit alarm on  -MM               User Key  (r) = Recorded By, (t) = Taken By, (c) = Cosigned By      Initials Name Provider Type    Gris Lugo OT Occupational Therapist                   Outcome Measures       Row Name 01/29/25 1256          How much help from another is currently needed...    Putting on and taking off regular lower body clothing? 3  -MM     Bathing (including washing, rinsing, and drying) 3  -MM     Toileting (which includes using toilet bed pan or urinal) 3  -MM     Putting on and taking off regular upper body clothing 4  -MM     Taking care of personal grooming (such as brushing teeth) 4  -MM     Eating meals 4  -MM     AM-PAC 6 Clicks Score (OT) 21  -MM       Row Name 01/29/25 0920 01/29/25 0800       How much help from another person do you currently need...    Turning from your back to your side while in flat bed without using bedrails? 3  -SM 3  -SS    Moving from lying on back to sitting on the side of a flat bed without bedrails? 3  -SM 3  -SS    Moving to and from a bed to a chair (including a wheelchair)? 3  -SM 2  -SS    Standing up from a chair using your arms (e.g., wheelchair, bedside chair)? 3  -SM 2  -SS    Climbing 3-5 steps with a railing? 2  -SM 2  -SS    To walk in hospital room? 2  -SM 2  -SS    AM-PAC 6 Clicks Score (PT) 16  -SM 14  -SS    Highest Level of Mobility  Goal 5 --> Static standing  - 4 --> Transfer to chair/commode  -      Row Name 01/29/25 1256          Modified Oren Scale    Modified [place] Scale 2 - Slight disability.  Unable to carry out all previous activities but able to look after own affairs without assistance.  -MM       Row Name 01/29/25 1256 01/29/25 0920       Functional Assessment    Outcome Measure Options AM-PAC 6 Clicks Daily Activity (OT);Modified Oren  -MM AM-PAC 6 Clicks Basic Mobility (PT)  -SM              User Key  (r) = Recorded By, (t) = Taken By, (c) = Cosigned By      Initials Name Provider Type    Gris Lugo OT Occupational Therapist    Elvia Bell, RN Registered Nurse    Larisa Barry, PT Physical Therapist                    Occupational Therapy Education       Title: PT OT SLP Therapies (In Progress)       Topic: Occupational Therapy (In Progress)       Point: ADL training (Done)       Description:   Instruct learner(s) on proper safety adaptation and remediation techniques during self care or transfers.   Instruct in proper use of assistive devices.                  Learning Progress Summary            Patient Acceptance, E, VU by MM at 1/29/2025 1256    Comment: role of OT, d/c rec, plan of care                      Point: Home exercise program (Not Started)       Description:   Instruct learner(s) on appropriate technique for monitoring, assisting and/or progressing therapeutic exercises/activities.                  Learner Progress:  Not documented in this visit.              Point: Precautions (Done)       Description:   Instruct learner(s) on prescribed precautions during self-care and functional transfers.                  Learning Progress Summary            Patient Acceptance, E, VU by MM at 1/29/2025 1256    Comment: role of OT, d/c rec, plan of care                      Point: Body mechanics (Done)       Description:   Instruct learner(s) on proper positioning and spine alignment during self-care,  functional mobility activities and/or exercises.                  Learning Progress Summary            Patient Acceptance, E, VU by MARY ANN at 1/29/2025 7982    Comment: role of OT, d/c rec, plan of care                                      User Key       Initials Effective Dates Name Provider Type Discipline    MARY ANN 05/31/24 -  Gris Cuello OT Occupational Therapist OT                  OT Recommendation and Plan  Planned Therapy Interventions (OT): activity tolerance training, neuromuscular control/coordination retraining, patient/caregiver education/training, transfer/mobility retraining, strengthening exercise, ROM/therapeutic exercise, occupation/activity based interventions, functional balance retraining  Therapy Frequency (OT): 5 times/wk  Plan of Care Review  Plan of Care Reviewed With: patient  Progress: no change  Outcome Evaluation: Pt is an 80 yo male admitted with generalized weakness. he has hx Parkinson's disease. He is seen this date for OT ARMANDO shane&Silvia3, reports he lives at home with his spouse, mod (I) with ADLs, uses rwx for most functional mobility. Pt reports multiple falls over last week. He presents with decreased activity tolerance and generalized weakness required for optimal (I) with ADLs. He requires min A x1 for bed mobility, CGA for STS and functional transfers using rwx. He is able to tolerate short distance to chair before fatigue. He will continue to benefit from skilled OT to maximize (I) prior to d/c to ensure safety upon return home and decrease risk of falls. He plans home, rec HHOT to f/u.     Time Calculation:   Evaluation Complexity (OT)  Review Occupational Profile/Medical/Therapy History Complexity: expanded/moderate complexity  Assessment, Occupational Performance/Identification of Deficit Complexity: 3-5 performance deficits  Clinical Decision Making Complexity (OT): detailed assessment/moderate complexity  Overall Complexity of Evaluation (OT): moderate complexity     Time  Calculation- OT       Row Name 01/29/25 1257             Time Calculation- OT    OT Start Time 0842  -MM      OT Stop Time 0900  -MM      OT Time Calculation (min) 18 min  -MM      OT Received On 01/29/25  -MM      OT - Next Appointment 01/30/25  -MM      OT Goal Re-Cert Due Date 02/12/25  -MM         Untimed Charges    OT Eval/Re-eval Minutes 18  -MM         Total Minutes    Untimed Charges Total Minutes 18  -MM       Total Minutes 18  -MM                User Key  (r) = Recorded By, (t) = Taken By, (c) = Cosigned By      Initials Name Provider Type    MM Gris Cuello OT Occupational Therapist                  Therapy Charges for Today       Code Description Service Date Service Provider Modifiers Qty    [id number] HC OT EVAL MOD COMPLEXITY 3 1/29/2025 Gris Cuello OT GO 1                 Gris Cuello OT  1/29/2025

## 2025-01-29 NOTE — PLAN OF CARE
Goal Outcome Evaluation:      Patient alert/oriented x4, forgetful. Speech eval complete, diet order modified. Taking pills crushed in applesauce. Nutrition, neuro consults placed. G- tube clamped. Placed on pro + bed. PRN Norco for pain. Incontinence care.

## 2025-01-29 NOTE — PLAN OF CARE
Goal Outcome Evaluation:  Plan of Care Reviewed With: patient           Outcome Evaluation: Patient is an 79 y.o male who presented to [facility] with generalized weakness. Patient with hx of Parkinson's. Patient AOx4 supine in bed upon arrival. Patient lives at home with his wife, typically independent at baseline with support of rwx. Patient reports multiple falls over the past week. Today patient sat up to EOB with Toribio. Patient stood from EOB with CGA. Patient ambulated 5ft with rwx and CGA. Slow shuffled steps with patient quick to fatigue and requested seated rest break. Patient would continue to benefit from skilled PT intervention to address deficits in functional mobility. Recommend patient get up with nsg several times per day to continue to work on endurance with activity. Anticipate home with assist and HHPT at d/c pending medical course. PT will continue to monitor.    Anticipated Discharge Disposition (PT): home with assist, home with home health

## 2025-01-29 NOTE — PLAN OF CARE
Goal Outcome Evaluation:         Patient arrived to 3P from ED via stretcher at around 2211. Pt assisted from stretcher to bed via 3 person assist r/t generalized weakness. 20g in right ac, g-tube in place which pt reports is used for medications administration, last BM 01/28/25, on room air, reg diet, accumax in place, pt reports >60 lb of unexplained weight loss since summer 2024. Pt also reports experiencing covid around 09/2024, as well as difficulty chewing and swallowing,  PT/OT consult noted. Pt reports right leg and foot pain 10/10 which he reports is consistently constant and that he goes to pain management for care. WBC=25. Pt resting in bed at this time, plan of care is ongoing.

## 2025-01-29 NOTE — CONSULTS
"Nutrition Services    Patient Name:  Madhu Santoro  YOB: 1945  MRN: [id number]  Admit Date:  1/28/2025    Assessment Date:  01/29/25    CLINICAL NUTRITION ASSESSMENT    Comments: Consult for tube feeding assessment    Visited with patient regarding home tube feeding regimen.  Mr Barnett was not able to tell me his home formula or Home Health company.  States wife (Brooke) knows this information.  He did report that they have a lot of formula at home.  Mr Barnett states he receives 1/2 carton of the formula after each meal.  Suspect formula might be Isosource 1.5 as this was formula from prior admission.      Due to patient's weight loss over past year of 13% (does not meet malnutrition criteria threshold) will increase volume of bolus feedings.    Recommendations:  Formula: Nutren 2.0  Method: bolus  Bolus Schedule:   - 125 ml Nutren 2.0 BID via G-tube after each meal (TID)   - 250 ml Nutren 2.0 at bedtime nightly (QD)  - 50 ml water flush after each bolus (QID)      Initial/Goal Rate/Volume: as above as patient is already on meal regimen  Water Flush: 50 mL after Q bolus    Schedule will meet 56% kcal and Pro needs.  Additional via po intake at meals.    RD to follow tolerance, labs and clinical course.     Encounter Information         Reason for Encounter Home TF    Admitting Diagnosis Generalized Weakness    Pertinent Medical History Parkinson's, Hx reviewed; SLP eval occurred this morning    Current Issues Need regimen for TF     Current Nutrition Orders & Evaluation of Intake       Oral Nutrition     Food Allergies    Current PO Diet Diet: Regular/House; No Mixed Consistencies; Texture: Soft to Chew (NDD 3); Soft to Chew: Ground Meat; Fluid Consistency: Nectar Thick   Supplement    PO Evaluation      % PO Intake    --  Anthropometrics          Height    Weight Height: 172.7 cm (68\")  Weight: 63 kg (138 lb 14.2 oz) (01/28/25 2230)    BMI kg/m2 Body mass index is 21.12 kg/m².    BMI Category " Normal/Healthy (18.4 - 24.9)    Weight History  Wt Readings from Last 15 Encounters:   01/28/25 2230 63 kg (138 lb 14.2 oz)   01/21/25 1552 65.7 kg (144 lb 14.4 oz)   12/20/24 1422 67.5 kg (148 lb 12.8 oz)   12/19/24 1556 66.2 kg (146 lb)   11/14/24 1453 68.7 kg (151 lb 6.4 oz)   10/31/24 1243 68.4 kg (150 lb 12.8 oz)   09/27/24 1419 70.6 kg (155 lb 9.6 oz)   09/26/24 1311 69.9 kg (154 lb)   09/04/24 1128 69.9 kg (154 lb)   08/27/24 1627 68 kg (150 lb)   08/26/24 1447 70 kg (154 lb 6.4 oz)   08/08/24 1340 69.4 kg (153 lb)   07/24/24 1558 71.2 kg (156 lb 14.4 oz)   06/26/24 1124 69.9 kg (154 lb)   06/24/24 1310 70.2 kg (154 lb 12.8 oz)        Estimated/Assessed Needs        Energy Requirements    Weight for Calculation 63 kg   Method for Estimation  35-40 kcal/kg   EST Needs (kcal/day) 9229-7423        Protein Requirements    Weight for Calculation 63 kg   EST Protein Needs (g/kg) 1.5 - 2.0 gm/kg   EST Daily Needs (g/day)         Fluid Requirements     Method for Estimation 30 mL/kg    Estimated Needs (mL/day) 1900         Physical Findings          Physical Appearance alert, frail, oriented   Oral/Mouth Cavity tooth or teeth missing   Edema  no edema   Gastrointestinal fecal incontinence, last bowel movement: 1/28   Skin  skin intact   Tubes/Drains/Lines PEG   NFPE Other: Will complete on next visit     Labs        Pertinent Labs Reviewed, listed below     Results from last 7 days   Lab Units 01/29/25  0648 01/28/25  1327   SODIUM mmol/L 138 138   POTASSIUM mmol/L 4.7 4.0   CHLORIDE mmol/L 106 101   CO2 mmol/L 23.0 30.7*   BUN mg/dL 16 18   CREATININE mg/dL 1.05 1.19   CALCIUM mg/dL 9.3 9.9   BILIRUBIN mg/dL  --  0.9   ALK PHOS U/L  --  150*   ALT (SGPT) U/L  --  8   AST (SGOT) U/L  --  26   GLUCOSE mg/dL 95 136*     Results from last 7 days   Lab Units 01/29/25  0648 01/28/25  1327   MAGNESIUM mg/dL  --  2.3   HEMOGLOBIN g/dL 13.8 14.8   HEMATOCRIT % 50.8 53.5*   WBC 10*3/mm3 26.40* 25.74*   ALBUMIN g/dL  --   4.1     Results from last 7 days   Lab Units 01/29/25  0648 01/28/25  1327   PLATELETS 10*3/mm3 625* 721*     COVID19   Date Value Ref Range Status   01/28/2025 Not Detected Not Detected - Ref. Range Final     Lab Results   Component Value Date    HGBA1C 5.50 11/04/2023          Medications            Scheduled Medications allopurinol, 300 mg, Oral, Daily  apixaban, 2.5 mg, Oral, Q12H  carbidopa-levodopa, 1 tablet, Oral, 4x Daily  hydroxyurea, 500 mg, Oral, Daily  lisinopril, 5 mg, Oral, Daily  pantoprazole, 40 mg, Oral, Q AM  rosuvastatin, 20 mg, Oral, Nightly        Infusions sodium chloride, 75 mL/hr, Last Rate: 75 mL/hr (01/29/25 1357)        PRN Medications   acetaminophen **OR** acetaminophen **OR** acetaminophen    senna-docusate sodium **AND** polyethylene glycol **AND** bisacodyl **AND** bisacodyl    HYDROcodone-acetaminophen    ondansetron     PES STATEMENT / NUTRITION DIAGNOSIS      Nutrition Dx Problem  Problem: Inadequate Oral Intake  Etiology: Factors Affecting Nutrition - dysphagia  Signs/Symptoms: SLP/Swallow Evaluation    Comment:      PLAN OF CARE  Intervention Goal        Intervention Goal(s) Meet estimated needs, Tolerate PO , Maintain TF/PN, and Maintain weight     Nutrition Intervention         RD Action Supplement provided, Encourage intake, Continue to monitor, and Recommend/order: Nutren 2.0 per schedule     Nutrition Prescription          Recommendation       Enteral Prescription:     Enteral Route PEG    TF Delivery Method Bolus    Enteral Product Nutren 2.0    Modular None                       Mr Barnett is on a bolus schedule, however he cannot tell me his home formula or Home Health provider.  Called wife but call went to voice mail.  Adjustment needed in home regimen d/t weight loss over past year     Recommend - 125 ml Nutren 2.0 BID via G-tube after each meal (TID)    - 250 ml Nutren 2.0 at bedtime nightly (QD)  - 50 ml water flush after each bolus (QID)    TF Provision at Goal:           Calories 1250 kcal, meets 56 % needs         Protein  53 gm protein, meets 56 % needs         Fluid (mL) 431 mL free water + 200 mL in flushes    Prescription Ordered Yes     Monitor/Evaluation        Monitor Per protocol     RD to follow up per protocol.    Electronically signed by:  James Méndez RD  01/29/25 17:59 EST

## 2025-01-29 NOTE — PLAN OF CARE
Goal Outcome Evaluation:  Plan of Care Reviewed With: patient        Progress: no change  Outcome Evaluation: Pt is an 78 yo male admitted with generalized weakness. he has hx Parkinson's disease. He is seen this date for OT Toan shane, reports he lives at home with his spouse, mod (I) with ADLs, uses rwx for most functional mobility. Pt reports multiple falls over last week. He presents with decreased activity tolerance and generalized weakness required for optimal (I) with ADLs. He requires min A x1 for bed mobility, CGA for STS and functional transfers using rwx. He is able to tolerate short distance to chair before fatigue. He will continue to benefit from skilled OT to maximize (I) prior to d/c to ensure safety upon return home and decrease risk of falls. He plans home, rec HHOT to f/u.    Anticipated Discharge Disposition (OT): home with assist, home with home health

## 2025-01-29 NOTE — THERAPY EVALUATION
Patient Name: Madhu Santoro  : 1945    MRN: [id number]                              Today's Date: 2025       Admit Date: 2025    Visit Dx:     ICD-10-CM ICD-9-CM   1. General weakness  R53.1 780.79   2. Difficulty walking  R26.2 719.7   3. Parkinson's disease, unspecified whether dyskinesia present, unspecified whether manifestations fluctuate  G20.A1 332.0   4. Viral URI with cough  J06.9 465.9   5. Dysuria  R30.0 788.1   6. Hyperglycemia  R73.9 790.29   7. Elevated troponin  R79.89 790.6   8. Polycythemia vera  D45 238.4     Patient Active Problem List   Diagnosis    Anxiety    Arthritis    Coronary artery disease due to lipid rich plaque    HLD (hyperlipidemia)    Benign essential hypertension    DDD (degenerative disc disease), lumbosacral    Cerebrovascular accident    Encounter for screening for cardiovascular disorders    Gastroesophageal reflux disease    Hyperlipidemia    Stenosis of carotid artery    Former smoker    History of cardiac catheterization    S/P ablation of atrial flutter    Typical atrial flutter    S/P CABG (coronary artery bypass graft)    Adenomatous polyp of ascending colon    Abdominal bloating    Stroke    PAD (peripheral artery disease)    Acute cholecystitis    Right upper quadrant abdominal pain    Chronic pain of both hips    Chronic bilateral low back pain without sciatica    Sacroiliac joint dysfunction of both sides    Encounter for long-term (current) use of high-risk medication    Lumbar facet arthropathy    Stroke-like symptoms    CVA (cerebral vascular accident)    Personal history of colonic polyps    Arthralgia of multiple joints    Iron deficiency    Thrombocytosis    Left ureteral stone    Obstructive uropathy    Chronic anticoagulation    Facial skin lesion    Iron deficiency anemia    Polycythemia    Neuropathy    Weakness    Pneumonia    Close exposure to COVID-19 virus    Polycythemia vera    Gout    COVID-19    Cytokine release syndrome, grade 2  "   Anemia    History of CVA (cerebrovascular accident)    S/P percutaneous endoscopic gastrostomy (PEG) tube placement    Mandel esophagus    Sacral decubitus ulcer    Oral phase dysphagia    Acute blood loss anemia    Orthostatic dizziness    Orthostatic hypotension    Bilateral leg weakness    General weakness     Past Medical History:   Diagnosis Date    Anxiety     Arthritis     Atrial flutter     Status post cavotricuspid isthmus ablation by Dr. Gustafson on 4/18/17    Mandel esophagus     Benign essential hypertension     CAD (coronary artery disease)     3 vessel CABG 4/11/17 by Dr. Cortez: ROSARIO-prox LAD, SVG-OM1, SVG-OM3    Carotid artery disease     Status post carotid endarterectomy - USG 4/10/17: 50-59% NICHELLE, 1-15% LICA.     Colonic polyp     COVID-19 long hauler     Cyst of pancreas     DDD (degenerative disc disease), lumbosacral     GERD (gastroesophageal reflux disease)     H/O bone density study 2013    H/O complete eye exam 2014    History of kidney stone     HLD (hyperlipidemia)     Hypertension     Kidney stone     8/22/22    Lipid screening 05/31/2013    Low back pain     physical therapy [facility]    Parkinson disease     Screening for prostate cancer 07/07/2015    Skin cancer     nose    Stroke     RESIDUAL--\"BALANCE ISSUES\"     Past Surgical History:   Procedure Laterality Date    CARDIAC CATHETERIZATION N/A 04/10/2017    Procedure: Left Heart Cath;  Surgeon: Marjorie Healy MD;  Location:  [facility] C[facility] INVASIVE [facility]ATION;  Service:     CARDIAC CATHETERIZATION N/A 04/10/2017    Procedure: Coronary angiography;  Surgeon: Marjorie Healy MD;  Location:  [facility];  Service:     CARDIAC CATHETERIZATION N/A 04/10/2017    Procedure: Left ventriculography;  Surgeon: Marjorie Healy MD;  Location:  [facility];  Service:     CARDIAC CATHETERIZATION  2011    CARDIAC ELECTROPHYSIOLOGY PROCEDURE N/A 04/18/2017    Procedure: Ablation atrial flutter;  " Surgeon: Jose Antonio Gustafson MD;  Location: [facility] CATH INVASIVE LOCATION;  Service:     CAROTID ENDARTERECTOMY      CHOLECYSTECTOMY      CHOLECYSTECTOMY WITH INTRAOPERATIVE CHOLANGIOGRAM N/A 09/07/2019    Procedure: Laparoscopic cholecystectomy with intraoperative cholangiogram;  Surgeon: Gauri Travis MD;  Location: [facility] MAIN OR;  Service: General    COLONOSCOPY  01/06/2015    Diverticulosis, one TA    COLONOSCOPY N/A 02/14/2019    tics, NBIH, adenomatous polyp x 2    COLONOSCOPY N/A 11/05/2021    Procedure: COLONOSCOPY TO CECUM AND TERM. ILEUM WITH COLD POLYPECTOMIES;  Surgeon: Everton Abel MD;  Location: [facility] ENDOSCOPY;  Service: Gastroenterology;  Laterality: N/A;  PRE OP - PERS H/O POLYPS  POST OP - COLON POLYPS,, DIVERTICULOSIS, HEMORRHOIDS    CORONARY ARTERY BYPASS GRAFT N/A 04/11/2017    Procedure: AR STERNOTOMY CORONARY ARTERY BYPASS GRAFT TIMES 3 USING LEFT INTERNAL MAMMARY ARTERY AND LEFT GREATER SAPHENOUS VEIN GRAFT PER ENDOSCOPIC VEIN HARVESTING AND PRP ;  Surgeon: Temo Cortez MD;  Location: [facility] MAIN OR;  Service:     ENDOSCOPY  01/06/2015    HH, Ervin's esophagus    ENDOSCOPY N/A 02/14/2019    Z line irregular, HH, Ervin's esophagus    ENDOSCOPY N/A 11/05/2021    Procedure: ESOPHAGOGASTRODUODENOSCOPY WITH BIOPSIES;  Surgeon: Everton Abel MD;  Location: [facility] ENDOSCOPY;  Service: Gastroenterology;  Laterality: N/A;  PRE OP - PERS H/O ERVIN'S  POST OP - IRREG Z LINE    ENDOSCOPY W/ PEG TUBE PLACEMENT N/A 11/6/2023    Procedure: ESOPHAGOGASTRODUODENOSCOPY WITH PERCUTANEOUS ENDOSCOPIC GASTROSTOMY TUBE INSERTION;  Surgeon: Temo Ramsey MD;  Location: [facility] ENDOSCOPY;  Service: General;  Laterality: N/A;  Pre: dysphagia  Post: same    KNEE SURGERY Left     URETEROSCOPY LASER LITHOTRIPSY WITH STENT INSERTION Left 8/23/2022    Procedure: Cysto retrograde with left uretro stent placement;  Surgeon: Damien Oliveira MD;  Location: [facility] OR;  Service:  Urology;  Laterality: Left;    VASECTOMY        General Information       Row Name 01/29/25 0915          Physical Therapy Time and Intention    Document Type evaluation  -     Mode of Treatment individual therapy;physical therapy  -       Row Name 01/29/25 0915          General Information    Patient Profile Reviewed yes  -     Prior Level of Function independent:  -     Existing Precautions/Restrictions fall  -       Row Name 01/29/25 0915          Living Environment    People in Home spouse  -       Row Name 01/29/25 0915          Cognition    Orientation Status (Cognition) oriented x 4  -       Row Name 01/29/25 0915          Safety Issues/Impairments Affecting Functional Mobility    Impairments Affecting Function (Mobility) balance;endurance/activity tolerance;strength  -               User Key  (r) = Recorded By, (t) = Taken By, (c) = Cosigned By      Initials Name Provider Type    Larisa Barry PT Physical Therapist                   Mobility       Row Name 01/29/25 0916          Bed Mobility    Bed Mobility supine-sit  -     Supine-Sit [place] (Bed Mobility) minimum assist (75% patient effort);verbal cues  -     Assistive Device (Bed Mobility) head of bed elevated;bed rails  -       Row Name 01/29/25 0916          Sit-Stand Transfer    Sit-Stand [place] (Transfers) contact guard;verbal cues  -     Assistive Device (Sit-Stand Transfers) walker, front-wheeled  -       Row Name 01/29/25 0916          Gait/Stairs (Locomotion)    [place] Level (Gait) contact guard  -     Assistive Device (Gait) walker, front-wheeled  -     Distance in Feet (Gait) 5  -SM     Deviations/Abnormal Patterns (Gait) festinating/shuffling;gait speed decreased  -     Comment, (Gait/Stairs) Slow shuffled steps with patient quick to fatigue.  -               User Key  (r) = Recorded By, (t) = Taken By, (c) = Cosigned By      Initials Name Provider Type    Larisa Baryr PT  Physical Therapist                   Obj/Interventions       Row Name 01/29/25 0916          Range of Motion Comprehensive    General Range of Motion bilateral lower extremity ROM WFL  -       Row Name 01/29/25 0916          Strength Comprehensive (MMT)    General Manual Muscle Testing (MMT) Assessment lower extremity strength deficits identified  -     Comment, General Manual Muscle Testing (MMT) Assessment Generalized B LE weakness  -       Row Name 01/29/25 0916          Balance    Balance Assessment sitting static balance;sitting dynamic balance;standing static balance;standing dynamic balance  -     Static Sitting Balance supervision  -     Dynamic Sitting Balance standby assist  -     Position, Sitting Balance sitting edge of bed  -     Static Standing Balance contact guard  -     Dynamic Standing Balance contact guard  -     Position/Device Used, Standing Balance supported;walker, front-wheeled  -     Balance Interventions sitting;standing;sit to stand;supported;static;dynamic  -               User Key  (r) = Recorded By, (t) = Taken By, (c) = Cosigned By      Initials Name Provider Type     Larisa Brito, PT Physical Therapist                   Goals/Plan       Row Name 01/29/25 0920          Bed Mobility Goal 1 (PT)    Activity/Assistive Device (Bed Mobility Goal 1, PT) bed mobility activities, all  -     Nicholas Level/Cues Needed (Bed Mobility Goal 1, PT) supervision required  -     Time Frame (Bed Mobility Goal 1, PT) 1 week  -       Row Name 01/29/25 0920          Transfer Goal 1 (PT)    Activity/Assistive Device (Transfer Goal 1, PT) sit-to-stand/stand-to-sit;bed-to-chair/chair-to-bed  -     Nicholas Level/Cues Needed (Transfer Goal 1, PT) standby assist  -     Time Frame (Transfer Goal 1, PT) 1 week  -       Row Name 01/29/25 0920          Gait Training Goal 1 (PT)    Activity/Assistive Device (Gait Training Goal 1, PT) gait (walking locomotion);walker,  rolling  -SM     [place] Level (Gait Training Goal 1, PT) standby assist  -SM     Distance (Gait Training Goal 1, PT) 50ft  -SM     Time Frame (Gait Training Goal 1, PT) 1 week  -SM               User Key  (r) = Recorded By, (t) = Taken By, (c) = Cosigned By      Initials Name Provider Type    SM Larisa Brito, PT Physical Therapist                   Clinical Impression       Row Name 01/29/25 0917          Pain    Pretreatment Pain Rating 0/10 - no pain  -SM     Posttreatment Pain Rating 0/10 - no pain  -SM       Row Name 01/29/25 0917          Plan of Care Review    Plan of Care Reviewed With patient  -SM     Outcome Evaluation Patient is an 79 y.o male who presented to [facility] with generalized weakness. Patient with hx of Parkinson's. Patient AOx4 supine in bed upon arrival. Patient lives at home with his wife, typically independent at baseline with support of rwx. Patient reports multiple falls over the past week. Today patient sat up to EOB with Toribio. Patient stood from EOB with CGA. Patient ambulated 5ft with rwx and CGA. Slow shuffled steps with patient quick to fatigue and requested seated rest break. Patient would continue to benefit from skilled PT intervention to address deficits in functional mobility. Recommend patient get up with nsg several times per day to continue to work on endurance with activity. Anticipate home with assist and HHPT at d/c pending medical course. PT will continue to monitor.  -       Row Name 01/29/25 0917          Therapy Assessment/Plan (PT)    Rehab Potential (PT) good  -SM     Criteria for Skilled Interventions Met (PT) yes  -SM     Therapy Frequency (PT) 5 times/wk  -SM       Row Name 01/29/25 0917          Vital Signs    Pre Patient Position Supine  -SM     Intra Patient Position Standing  -SM     Post Patient Position Sitting  -SM       Row Name 01/29/25 0917          Positioning and Restraints    Pre-Treatment Position in bed  -SM     Post Treatment Position chair   -     In Chair notified nsg;reclined;call light within reach;encouraged to call for assist;exit alarm on  -SM               User Key  (r) = Recorded By, (t) = Taken By, (c) = Cosigned By      Initials Name Provider Type    Larisa Barry PT Physical Therapist                   Outcome Measures       Row Name 01/29/25 0920 01/28/25 2222       How much help from another person do you currently need...    Turning from your back to your side while in flat bed without using bedrails? 3  -SM 1  -TL    Moving from lying on back to sitting on the side of a flat bed without bedrails? 3  -SM 1  -TL    Moving to and from a bed to a chair (including a wheelchair)? 3  -SM 1  -TL    Standing up from a chair using your arms (e.g., wheelchair, bedside chair)? 3  -SM 1  -TL    Climbing 3-5 steps with a railing? 2  -SM 1  -TL    To walk in hospital room? 2  -SM 1  -TL    AM-PAC 6 Clicks Score (PT) 16  -SM 6  -TL    Highest Level of Mobility Goal 5 --> Static standing  -SM 2 --> Bed activities/dependent transfer  -TL      Row Name 01/29/25 0920          Functional Assessment    Outcome Measure Options AM-PAC 6 Clicks Basic Mobility (PT)  -               User Key  (r) = Recorded By, (t) = Taken By, (c) = Cosigned By      Initials Name Provider Type    TL Daniel Mayo, RN Registered Nurse    Larisa Barry, SHANELL Physical Therapist                                 Physical Therapy Education       Title: PT OT SLP Therapies (Done)       Topic: Physical Therapy (Done)       Point: Mobility training (Done)       Learning Progress Summary            Patient Acceptance, E, VU by  at 1/29/2025 0920                      Point: Home exercise program (Done)       Learning Progress Summary            Patient Acceptance, E, VU by  at 1/29/2025 0920                      Point: Body mechanics (Done)       Learning Progress Summary            Patient Acceptance, E, VU by  at 1/29/2025 0920                      Point:  Precautions (Done)       Learning Progress Summary            Patient Acceptance, E, VU by  at 1/29/2025 0920                                      User Key       Initials Effective Dates Name Provider Type Discipline     05/02/22 -  Larisa Brito PT Physical Therapist PT                  PT Recommendation and Plan     Outcome Evaluation: Patient is an 79 y.o male who presented to [facility] with generalized weakness. Patient with hx of Parkinson's. Patient AOx4 supine in bed upon arrival. Patient lives at home with his wife, typically independent at baseline with support of rwx. Patient reports multiple falls over the past week. Today patient sat up to EOB with Toribio. Patient stood from EOB with CGA. Patient ambulated 5ft with rwx and CGA. Slow shuffled steps with patient quick to fatigue and requested seated rest break. Patient would continue to benefit from skilled PT intervention to address deficits in functional mobility. Recommend patient get up with nsg several times per day to continue to work on endurance with activity. Anticipate home with assist and HHPT at d/c pending medical course. PT will continue to monitor.     Time Calculation:         PT Charges       Row Name 01/29/25 0921             Time Calculation    Start Time 0844  -      Stop Time 0857  -      Time Calculation (min) 13 min  -SM      PT Received On 01/29/25  -      PT - Next Appointment 01/30/25  -      PT Goal Re-Cert Due Date 02/05/25  -         Time Calculation- PT    Total Timed Code Minutes- PT 8 minute(s)  -         Timed Charges    66533 - PT Therapeutic Activity Minutes 8  -SM         Total Minutes    Timed Charges Total Minutes 8  -SM       Total Minutes 8  -SM                User Key  (r) = Recorded By, (t) = Taken By, (c) = Cosigned By      Initials Name Provider Type     Larisa Brito PT Physical Therapist                  Therapy Charges for Today       Code Description Service Date Service Provider Modifiers  Qty    35509234628 HC PT THERAPEUTIC ACT EA 15 MIN 1/29/2025 Larisa Brito, PT GP 1    30450406004 HC PT EVAL LOW COMPLEXITY 3 1/29/2025 Larisa Brito, PT GP 1            PT G-Codes  Outcome Measure Options: AM-PAC 6 Clicks Basic Mobility (PT)  AM-PAC 6 Clicks Score (PT): 16  PT Discharge Summary  Anticipated Discharge Disposition (PT): home with assist, home with home health    Larisa Brito PT  1/29/2025

## 2025-01-29 NOTE — THERAPY EVALUATION
Acute Care - Speech Language Pathology   Swallow Initial Evaluation Psychiatric     Patient Name: Madhu Santoro  : 1945  MRN: [id number]  Today's Date: 2025               Admit Date: 2025    Visit Dx:     ICD-10-CM ICD-9-CM   1. General weakness  R53.1 780.79   2. Difficulty walking  R26.2 719.7   3. Parkinson's disease, unspecified whether dyskinesia present, unspecified whether manifestations fluctuate  G20.A1 332.0   4. Viral URI with cough  J06.9 465.9   5. Dysuria  R30.0 788.1   6. Hyperglycemia  R73.9 790.29   7. Elevated troponin  R79.89 790.6   8. Polycythemia vera  D45 238.4     Patient Active Problem List   Diagnosis    Anxiety    Arthritis    Coronary artery disease due to lipid rich plaque    HLD (hyperlipidemia)    Benign essential hypertension    DDD (degenerative disc disease), lumbosacral    Cerebrovascular accident    Encounter for screening for cardiovascular disorders    Gastroesophageal reflux disease    Hyperlipidemia    Stenosis of carotid artery    Former smoker    History of cardiac catheterization    S/P ablation of atrial flutter    Typical atrial flutter    S/P CABG (coronary artery bypass graft)    Adenomatous polyp of ascending colon    Abdominal bloating    Stroke    PAD (peripheral artery disease)    Acute cholecystitis    Right upper quadrant abdominal pain    Chronic pain of both hips    Chronic bilateral low back pain without sciatica    Sacroiliac joint dysfunction of both sides    Encounter for long-term (current) use of high-risk medication    Lumbar facet arthropathy    Stroke-like symptoms    CVA (cerebral vascular accident)    Personal history of colonic polyps    Arthralgia of multiple joints    Iron deficiency    Thrombocytosis    Left ureteral stone    Obstructive uropathy    Chronic anticoagulation    Facial skin lesion    Iron deficiency anemia    Polycythemia    Neuropathy    Weakness    Pneumonia    Close exposure to COVID-19 virus    Polycythemia  "vera    Gout    COVID-19    Cytokine release syndrome, grade 2    Anemia    History of CVA (cerebrovascular accident)    S/P percutaneous endoscopic gastrostomy (PEG) tube placement    Mandel esophagus    Sacral decubitus ulcer    Oral phase dysphagia    Acute blood loss anemia    Orthostatic dizziness    Orthostatic hypotension    Bilateral leg weakness    General weakness     Past Medical History:   Diagnosis Date    Anxiety     Arthritis     Atrial flutter     Status post cavotricuspid isthmus ablation by Dr. Gustafson on 4/18/17    Mandel esophagus     Benign essential hypertension     CAD (coronary artery disease)     3 vessel CABG 4/11/17 by Dr. Cortez: ROSARIO-prox LAD, SVG-OM1, SVG-OM3    Carotid artery disease     Status post carotid endarterectomy - USG 4/10/17: 50-59% NICHELLE, 1-15% LICA.     Colonic polyp     COVID-19 long hauler     Cyst of pancreas     DDD (degenerative disc disease), lumbosacral     GERD (gastroesophageal reflux disease)     H/O bone density study 2013    H/O complete eye exam 2014    History of kidney stone     HLD (hyperlipidemia)     Hypertension     Kidney stone     8/22/22    Lipid screening 05/31/2013    Low back pain     physical therapy [facility] rehab 5-12-10    Parkinson disease     Screening for prostate cancer 07/07/2015    Skin cancer     nose    Stroke     RESIDUAL--\"BALANCE ISSUES\"     Past Surgical History:   Procedure Laterality Date    CARDIAC CATHETERIZATION N/A 04/10/2017    Procedure: Left Heart Cath;  Surgeon: Marjorie Healy MD;  Location:  [facility];  Service:     CARDIAC CATHETERIZATION N/A 04/10/2017    Procedure: Coronary angiography;  Surgeon: Marjorie Healy MD;  Location:  [facility];  Service:     CARDIAC CATHETERIZATION N/A 04/10/2017    Procedure: Left ventriculography;  Surgeon: Marjorie Healy MD;  Location:  [facility];  Service:     CARDIAC CATHETERIZATION  2011    CARDIAC ELECTROPHYSIOLOGY PROCEDURE " N/A 04/18/2017    Procedure: Ablation atrial flutter;  Surgeon: Jose Antonio Gustafson MD;  Location: [facility] CATH INVASIVE LOCATION;  Service:     CAROTID ENDARTERECTOMY      CHOLECYSTECTOMY      CHOLECYSTECTOMY WITH INTRAOPERATIVE CHOLANGIOGRAM N/A 09/07/2019    Procedure: Laparoscopic cholecystectomy with intraoperative cholangiogram;  Surgeon: Gauri Travis MD;  Location: [facility] MAIN OR;  Service: General    COLONOSCOPY  01/06/2015    Diverticulosis, one TA    COLONOSCOPY N/A 02/14/2019    tics, NBIH, adenomatous polyp x 2    COLONOSCOPY N/A 11/05/2021    Procedure: COLONOSCOPY TO CECUM AND TERM. ILEUM WITH COLD POLYPECTOMIES;  Surgeon: Everton Abel MD;  Location: [facility] ENDOSCOPY;  Service: Gastroenterology;  Laterality: N/A;  PRE OP - PERS H/O POLYPS  POST OP - COLON POLYPS,, DIVERTICULOSIS, HEMORRHOIDS    CORONARY ARTERY BYPASS GRAFT N/A 04/11/2017    Procedure: AR STERNOTOMY CORONARY ARTERY BYPASS GRAFT TIMES 3 USING LEFT INTERNAL MAMMARY ARTERY AND LEFT GREATER SAPHENOUS VEIN GRAFT PER ENDOSCOPIC VEIN HARVESTING AND PRP ;  Surgeon: Temo Cortez MD;  Location: [facility] MAIN OR;  Service:     ENDOSCOPY  01/06/2015    HH, Ervin's esophagus    ENDOSCOPY N/A 02/14/2019    Z line irregular, HH, Ervin's esophagus    ENDOSCOPY N/A 11/05/2021    Procedure: ESOPHAGOGASTRODUODENOSCOPY WITH BIOPSIES;  Surgeon: Everton Abel MD;  Location: [facility] ENDOSCOPY;  Service: Gastroenterology;  Laterality: N/A;  PRE OP - PERS H/O ERVIN'S  POST OP - IRREG Z LINE    ENDOSCOPY W/ PEG TUBE PLACEMENT N/A 11/6/2023    Procedure: ESOPHAGOGASTRODUODENOSCOPY WITH PERCUTANEOUS ENDOSCOPIC GASTROSTOMY TUBE INSERTION;  Surgeon: Temo Ramsey MD;  Location: [facility] ENDOSCOPY;  Service: General;  Laterality: N/A;  Pre: dysphagia  Post: same    KNEE SURGERY Left     URETEROSCOPY LASER LITHOTRIPSY WITH STENT INSERTION Left 8/23/2022    Procedure: Cysto retrograde with left uretro stent placement;  Surgeon: Roxanne  Damien FUENTES MD;  Location: [facility] MAIN OR;  Service: Urology;  Laterality: Left;    VASECTOMY         SLP Recommendation and Plan  SLP Swallowing Diagnosis: oral dysphagia, pharyngeal dysphagia, moderate (01/29/25 1200)  SLP Diet Recommendation: soft to chew textures, ground, no mixed consistencies, nectar thick liquids, water between meals after oral care, with supervision, ice chips between meals after oral care, with supervision (01/29/25 1200)  Recommended Precautions and Strategies: upright posture during/after eating, small bites of food and sips of liquid, multiple swallows per bite of food, multiple swallows per sip of liquid, alternate between small bites of food and sips of liquid, general aspiration precautions (01/29/25 1200)  SLP Rec. for Method of Medication Administration: meds crushed, with puree, meds via alternate route (01/29/25 1200)     Monitor for Signs of Aspiration: yes, notify SLP if any concerns (01/29/25 1200)     Swallow Criteria for Skilled Therapeutic Interventions Met: demonstrates skilled criteria (01/29/25 1200)  Anticipated Discharge Disposition (SLP): anticipate therapy at next level of care (01/29/25 1200)  Rehab Potential/Prognosis, Swallowing: good, to achieve stated therapy goals (01/29/25 1200)  Therapy Frequency (Swallow): PRN (01/29/25 1200)  Predicted Duration Therapy Intervention (Days): until discharge (01/29/25 1200)  Oral Care Recommendations: Oral Care BID/PRN (01/29/25 1200)                                        Outcome Evaluation: Clinical swallow evaluation completed recommend mechanical soft ground no mixed consistencies and NTL. Meds crushed with puree. Small bites and sips, alternate liquids and solids and double swallows.      SWALLOW EVALUATION (Last 72 Hours)       SLP Adult Swallow Evaluation       Row Name 01/29/25 1200                   Rehab Evaluation    Document Type evaluation  -KA        Subjective Information no complaints  -KA        Patient  Observations alert;cooperative  -KA        Patient Effort good  -KA        Symptoms Noted During/After Treatment none  -KA           General Information    Patient Profile Reviewed yes  -KA        Pertinent History Of Current Problem Patient admitted with weakness, difficulty walking. Hx of Parkinsons disease, CVA and dysphagia with PEG. Multiple VFSSs in the past, most recent on 10/21/2024 recommended mechanical soft no mixed and NTL. Pt does not recall drinking NTL at home. Chest XRAY shows no focal pulmonary consolidation.  -KA        Current Method of Nutrition regular textures;thin liquids  -KA        Precautions/Limitations, Vision WFL;for purposes of eval  -KA        Precautions/Limitations, Hearing WFL;for purposes of eval  -KA        Prior Level of Function-Swallowing other (see comments)  see history  -KA        Plans/Goals Discussed with patient  -KA        Barriers to Rehab medically complex  question baseline cognition and recall of previous VFSSs results  -KA        Patient's Goals for Discharge patient did not state  -KA           Pain    Pretreatment Pain Rating 0/10 - no pain  -KA        Posttreatment Pain Rating 0/10 - no pain  -KA           Oral Motor Structure and Function    Dentition Assessment natural, present and adequate  -KA        Secretion Management WNL/WFL  -KA        Mucosal Quality moist, healthy  -KA           Oral Musculature and Cranial Nerve Assessment    Oral Motor General Assessment generalized oral motor weakness  -KA           General Eating/Swallowing Observations    Eating/Swallowing Skills self-fed  -KA        Positioning During Eating upright in chair  -KA        Utensils Used spoon;cup;straw  -KA        Consistencies Trialed soft to chew textures;chopped;mixed consistency;pureed;thin liquids;nectar/syrup-thick liquids  -KA           Clinical Swallow Eval    Clinical Swallow Evaluation Summary With thins via straw and cup pt demonstrated audible swallows and immediate  throat clearing via straw and delayed throat clearing via cup. No overt s/s of pen/asp with NTL, puree and mechanical soft chopped consistency. Pt stated he felt like the peaches were stuck in his throat and with cued multiple swallows and NTL wash pt reported that assisted in clearing. SLP reviewed results of VFSS with patient and importance of swallow precautions: alternate liquids and solids, multiple swallows and or hard swallows, small bites and sips.  -KA           SLP Evaluation Clinical Impression    SLP Swallowing Diagnosis oral dysphagia;pharyngeal dysphagia;moderate  -KA        Functional Impact risk of aspiration/pneumonia  -KA        Rehab Potential/Prognosis, Swallowing good, to achieve stated therapy goals  -KA        Swallow Criteria for Skilled Therapeutic Interventions Met demonstrates skilled criteria  -KA           Recommendations    Therapy Frequency (Swallow) PRN  -KA        Predicted Duration Therapy Intervention (Days) until discharge  -        SLP Diet Recommendation soft to chew textures;ground;no mixed consistencies;nectar thick liquids;water between meals after oral care, with supervision;ice chips between meals after oral care, with supervision  -KA        Recommended Precautions and Strategies upright posture during/after eating;small bites of food and sips of liquid;multiple swallows per bite of food;multiple swallows per sip of liquid;alternate between small bites of food and sips of liquid;general aspiration precautions  -KA        Oral Care Recommendations Oral Care BID/PRN  -KA        SLP Rec. for Method of Medication Administration meds crushed;with puree;meds via alternate route  -        Monitor for Signs of Aspiration yes;notify SLP if any concerns  -KA        Anticipated Discharge Disposition (SLP) anticipate therapy at next level of care  -KA           Swallow Goals (SLP)    Swallow STGs diet tolerance goal selection (SLP)  -KA        Diet Tolerance Goal Selection (SLP)  Patient will tolerate trials of  -KA           (STG) Patient will tolerate trials of    Consistencies Trialed (Tolerate trials) soft to chew (ground) textures;nectar/ mildly thick liquids  -KA        Desired Outcome (Tolerate trials) without signs/symptoms of aspiration  -KA        [place] (Tolerate trials) independently (over 90% accuracy)  -KA                  User Key  (r) = Recorded By, (t) = Taken By, (c) = Cosigned By      Initials Name Effective Dates    Valentino Geller SLP 01/05/24 -                     EDUCATION  The patient has been educated in the following areas:   Dysphagia (Swallowing Impairment).        SLP GOALS       Row Name 01/29/25 1200             (STG) Patient will tolerate trials of    Consistencies Trialed (Tolerate trials) soft to chew (ground) textures;nectar/ mildly thick liquids  -KA      Desired Outcome (Tolerate trials) without signs/symptoms of aspiration  -KA      [place] (Tolerate trials) independently (over 90% accuracy)  -KA                User Key  (r) = Recorded By, (t) = Taken By, (c) = Cosigned By      Initials Name Provider Type    Valentino Geller SLP Speech and Language Pathologist                         Time Calculation:    Time Calculation- SLP       Row Name 01/29/25 1230             Time Calculation- SLP    SLP Start Time 0930  -KA      SLP Received On 01/29/25  -KA         Untimed Charges    42637-TI Eval Oral Pharyng Swallow Minutes 55  -KA         Total Minutes    Untimed Charges Total Minutes 55  -KA       Total Minutes 55  -KA                User Key  (r) = Recorded By, (t) = Taken By, (c) = Cosigned By      Initials Name Provider Type    Valentino Geller SLP Speech and Language Pathologist                    Therapy Charges for Today       Code Description Service Date Service Provider Modifiers Qty    [id number] HC ST EVAL ORAL PHARYNG SWALLOW 4 1/29/2025 Valentino Peralta SLP GN 1                 GONZÁLEZ Michaud  1/29/2025

## 2025-01-29 NOTE — H&P
"HISTORY AND PHYSICAL   [facility]        Date of Admission: 2025  Patient Identification:  Name: Madhu Santoro  Age: 79 y.o.  Sex: male  :  1945  MRN: [id number]                     Primary Care Physician: Damian Sanchez MD    Chief Complaint:  79 year old gentleman presented to the emergency room with weakness and difficulty walking; he has a history of parkinsons disease and has been progressively worsening; he uses a walker at baseline but has not been able to ; he has been much worse for two weeks; no numbness or tingling; weakness is generalized and not focal; no fever or chills; no chest pain or shortness of breath; the patient is not able to give a good history; no family is present with him    History of Present Illness:   As above    Past Medical History:  Past Medical History:   Diagnosis Date    Anxiety     Arthritis     Atrial flutter     Status post cavotricuspid isthmus ablation by Dr. Gustafson on 17    Mandel esophagus     Benign essential hypertension     CAD (coronary artery disease)     3 vessel CABG 17 by Dr. Cortez: ROSARIO-prox LAD, SVG-OM1, SVG-OM3    Carotid artery disease     Status post carotid endarterectomy - [facility] 4/10/17: 50-59% NICHELLE, 1-15% LICA.     Colonic polyp     COVID-19 long hauler     Cyst of pancreas     DDD (degenerative disc disease), lumbosacral     GERD (gastroesophageal reflux disease)     H/O bone density study     H/O complete eye exam     History of kidney stone     HLD (hyperlipidemia)     Hypertension     Kidney stone     21    Lipid screening 2013    Low back pain     physical therapy [facility] 5-12-10    Parkinson disease     Screening for prostate cancer 2015    Skin cancer     nose    Stroke     RESIDUAL--\"BALANCE ISSUES\"     Past Surgical History:  Past Surgical History:   Procedure Laterality Date    CARDIAC CATHETERIZATION N/A 04/10/2017    Procedure: Left Heart Cath;  Surgeon: Marjorie Healy, " MD;  Location: [facility] CATH INVASIVE LOCATION;  Service:     CARDIAC CATHETERIZATION N/A 04/10/2017    Procedure: Coronary angiography;  Surgeon: Marjorie Healy MD;  Location: [facility] CATH INVASIVE LOCATION;  Service:     CARDIAC CATHETERIZATION N/A 04/10/2017    Procedure: Left ventriculography;  Surgeon: Marjorie Healy MD;  Location: [facility] CATH INVASIVE LOCATION;  Service:     CARDIAC CATHETERIZATION  2011    CARDIAC ELECTROPHYSIOLOGY PROCEDURE N/A 04/18/2017    Procedure: Ablation atrial flutter;  Surgeon: Jose Antonio Gustafson MD;  Location: [facility] CATH INVASIVE LOCATION;  Service:     CAROTID ENDARTERECTOMY      CHOLECYSTECTOMY      CHOLECYSTECTOMY WITH INTRAOPERATIVE CHOLANGIOGRAM N/A 09/07/2019    Procedure: Laparoscopic cholecystectomy with intraoperative cholangiogram;  Surgeon: Gauri Travis MD;  Location: [facility] OR;  Service: General    COLONOSCOPY  01/06/2015    Diverticulosis, one TA    COLONOSCOPY N/A 02/14/2019    tics, NBIH, adenomatous polyp x 2    COLONOSCOPY N/A 11/05/2021    Procedure: COLONOSCOPY TO CECUM AND TERM. ILEUM WITH COLD POLYPECTOMIES;  Surgeon: Everton Abel MD;  Location: [facility] ENDOSCOPY;  Service: Gastroenterology;  Laterality: N/A;  PRE OP - PERS H/O POLYPS  POST OP - COLON POLYPS,, DIVERTICULOSIS, HEMORRHOIDS    CORONARY ARTERY BYPASS GRAFT N/A 04/11/2017    Procedure: AR STERNOTOMY CORONARY ARTERY BYPASS GRAFT TIMES 3 USING LEFT INTERNAL MAMMARY ARTERY AND LEFT GREATER SAPHENOUS VEIN GRAFT PER ENDOSCOPIC VEIN HARVESTING AND PRP ;  Surgeon: Temo Cortez MD;  Location: [facility] MAIN OR;  Service:     ENDOSCOPY  01/06/2015    HH, Ervin's esophagus    ENDOSCOPY N/A 02/14/2019    Z line irregular, HH, Ervin's esophagus    ENDOSCOPY N/A 11/05/2021    Procedure: ESOPHAGOGASTRODUODENOSCOPY WITH BIOPSIES;  Surgeon: Everton Abel MD;  Location: [facility] ENDOSCOPY;  Service: Gastroenterology;  Laterality: N/A;  PRE OP - PERS H/O ERVIN'S  POST OP - IRREG Z LINE     ENDOSCOPY W/ PEG TUBE PLACEMENT N/A 11/6/2023    Procedure: ESOPHAGOGASTRODUODENOSCOPY WITH PERCUTANEOUS ENDOSCOPIC GASTROSTOMY TUBE INSERTION;  Surgeon: Temo Ramsey MD;  Location: [facility] ENDOSCOPY;  Service: General;  Laterality: N/A;  Pre: dysphagia  Post: same    KNEE SURGERY Left     URETEROSCOPY LASER LITHOTRIPSY WITH STENT INSERTION Left 8/23/2022    Procedure: Cysto retrograde with left uretro stent placement;  Surgeon: Damien Oliveira MD;  Location: [facility] MAIN OR;  Service: Urology;  Laterality: Left;    VASECTOMY        Home Meds:  Facility-Administered Medications Prior to Admission   Medication Dose Route Frequency Provider Last Rate Last Admin    cyanocobalamin injection 1,000 mcg  1,000 mcg Intramuscular Q30 Days Damian Sanchez MD   1,000 mcg at 04/06/23 0957     Medications Prior to Admission   Medication Sig Dispense Refill Last Dose/Taking    allopurinol (ZYLOPRIM) 300 MG tablet TAKE 1 TABLET BY MOUTH DAILY 90 tablet 0 Taking    apixaban (ELIQUIS) 2.5 MG tablet tablet Take 1 tablet by mouth Every 12 (Twelve) Hours. Indications: Atrial Fibrillation   Taking    carbidopa-levodopa (Sinemet)  MG per tablet Take 1 tablet by mouth 3 (Three) Times a Day. 90 tablet 2 Taking    HYDROcodone-acetaminophen (NORCO) 5-325 MG per tablet Take 1 tablet by mouth 3 (Three) Times a Day As Needed for Severe Pain or Moderate Pain. 30 day supply. 90 tablet 0 Taking As Needed    hydroxyurea (Hydrea) 500 MG capsule Take 1 capsule by mouth Daily. 30 capsule 1 Taking    lisinopril (PRINIVIL,ZESTRIL) 5 MG tablet Take 1 tablet by mouth Daily. 30 tablet 11 Taking    rosuvastatin (Crestor) 20 MG tablet Take 1 tablet by mouth Every Night. Indications: Cerebrovascular Accident or Stroke 90 tablet 1 Taking    cephalexin (KEFLEX) 500 MG capsule Take 1 capsule by mouth Every 12 (Twelve) Hours.   Unknown    gabapentin (NEURONTIN) 100 MG capsule TAKE ONE CAPSULE BY MOUTH TWICE DAILY 60 capsule 0 Unknown     Nutritional Supplements (OSMOLITE 1.5 GIANNI PO) 240 Units/oz/day by Gastrostomy Tube route 6 (Six) Times a Day.   Unknown    RABEprazole (ACIPHEX) 20 MG EC tablet TAKE 1 TABLET BY MOUTH DAILY 90 tablet 0 Unknown       Allergies:  Allergies   Allergen Reactions    Atorvastatin Myalgia     Myalgia    Penicillins Hives, Swelling and Rash     Tolerates cephalosporins      Immunizations:  Immunization History   Administered Date(s) Administered    COVID-19 (PFIZER) Purple Cap Monovalent 2021, 2021, 2021, 2021    FluMist 2-49yrs 2017    Fluad Quad 65+ 10/25/2021    Fluzone High-Dose 65+YRS 10/01/2015, 2016, 10/25/2019    Fluzone High-Dose 65+yrs 10/14/2022    Influenza TIV (IM) 10/06/2015    Pneumococcal Conjugate 13-Valent (PCV13) 2015    Tdap 2020    Zostavax 2017     Social History:   Social History     Social History Narrative    Not on file     Social History     Socioeconomic History    Marital status:      Spouse name: Brooke    Years of education: 9th grade   Tobacco Use    Smoking status: Former     Current packs/day: 0.00     Average packs/day: 1 pack/day for 50.0 years (50.0 ttl pk-yrs)     Types: Cigarettes     Start date: 1959     Quit date: 2009     Years since quittin.0     Passive exposure: Past    Smokeless tobacco: Never    Tobacco comments:     CAFFEINE USE   Vaping Use    Vaping status: Never Used   Substance and Sexual Activity    Alcohol use: Not Currently     Comment: rarely    Drug use: No    Sexual activity: Defer     Partners: Female       Family History:  Family History   Problem Relation Age of Onset    Hypertension Mother     Heart disease Mother     Heart attack Mother     Stroke Mother     Heart disease Father     Heart attack Father     Stroke Father     Hypertension Sister     Heart attack Brother     Heart disease Brother     No Known Problems Brother     Heart disease Brother     Heart attack Brother     Diabetes  "Brother     No Known Problems Maternal Grandmother     No Known Problems Maternal Grandfather     No Known Problems Paternal Grandmother     No Known Problems Paternal Grandfather     Pancreatic cancer Paternal Cousin     Arthritis Other     Diabetes Other     Hypertension Other     Kidney disease Other         stones    Malig Hyperthermia Neg Hx         Review of Systems  Not obtainable from the patient.    Objective:  T Max 24 hrs: Temp (24hrs), Av.5 °F (36.9 °C), Min:98.1 °F (36.7 °C), Max:98.9 °F (37.2 °C)    Vitals Ranges:   Temp:  [98.1 °F (36.7 °C)-98.9 °F (37.2 °C)] 98.1 °F (36.7 °C)  Heart Rate:  [57-79] 79  Resp:  [16-18] 16  BP: (149-174)/(67-84) 174/72      Exam:  /72 (BP Location: Left arm, Patient Position: Lying)   Pulse 79   Temp 98.1 °F (36.7 °C) (Oral)   Resp 16   Ht 172.7 cm (68\")   Wt 63 kg (138 lb 14.2 oz)   SpO2 93%   BMI 21.12 kg/m²     General Appearance:    Alert, cooperative, no distress, appears stated age; chronically ill appearing; thin, frail   Head:    Normocephalic, without obvious abnormality, atraumatic   Eyes:    PERRL, conjunctivae/corneas clear, EOM's intact, both eyes   Ears:    Normal external ear canals, both ears   Nose:   Nares normal, septum midline, mucosa normal, no drainage    or sinus tenderness   Throat:   Lips, mucosa, and tongue normal   Neck:   Supple, symmetrical, trachea midline, no adenopathy;     thyroid:  no enlargement/tenderness/nodules; no carotid    bruit or JVD   Back:     Symmetric, no curvature, ROM normal, no CVA tenderness   Lungs:     Decreased breath sounds bilaterally, respirations unlabored   Chest Wall:    No tenderness or deformity    Heart:    Regular rate and rhythm, S1 and S2 normal, no murmur, rub   or gallop   Abdomen:     Soft, nontender, bowel sounds active all four quadrants,     no masses, no hepatomegaly, no splenomegaly   Extremities:   Extremities normal, atraumatic, no cyanosis or edema                     "   .    Data Review:  Labs in chart were reviewed.  WBC   Date Value Ref Range Status   01/28/2025 25.74 (H) 3.40 - 10.80 10*3/mm3 Final     Hemoglobin   Date Value Ref Range Status   01/28/2025 14.8 13.0 - 17.7 g/dL Final     Hematocrit   Date Value Ref Range Status   01/28/2025 53.5 (H) 37.5 - 51.0 % Final     Platelets   Date Value Ref Range Status   01/28/2025 721 (H) 140 - 450 10*3/mm3 Final     Sodium   Date Value Ref Range Status   01/28/2025 138 136 - 145 mmol/L Final     Potassium   Date Value Ref Range Status   01/28/2025 4.0 3.5 - 5.2 mmol/L Final     Chloride   Date Value Ref Range Status   01/28/2025 101 98 - 107 mmol/L Final     CO2   Date Value Ref Range Status   01/28/2025 30.7 (H) 22.0 - 29.0 mmol/L Final     BUN   Date Value Ref Range Status   01/28/2025 18 8 - 23 mg/dL Final     Creatinine   Date Value Ref Range Status   01/28/2025 1.19 0.76 - 1.27 mg/dL Final     Glucose   Date Value Ref Range Status   01/28/2025 136 (H) 65 - 99 mg/dL Final     Calcium   Date Value Ref Range Status   01/28/2025 9.9 8.6 - 10.5 mg/dL Final     Magnesium   Date Value Ref Range Status   01/28/2025 2.3 1.6 - 2.4 mg/dL Final     AST (SGOT)   Date Value Ref Range Status   01/28/2025 26 1 - 40 U/L Final     ALT (SGPT)   Date Value Ref Range Status   01/28/2025 8 1 - 41 U/L Final     Alkaline Phosphatase   Date Value Ref Range Status   01/28/2025 150 (H) 39 - 117 U/L Final       Results from last 7 days   Lab Units 01/28/25  1327   TSH uIU/mL 3.710   FREE T4 ng/dL 1.35          Imaging Results (All)       Procedure Component Value Units Date/Time    XR Chest 1 View [[id number]] Collected: 01/28/25 1344     Updated: 01/28/25 1348    Narrative:      XR CHEST 1 VW-     HISTORY: Male who is 79 years-old, weakness     TECHNIQUE: Frontal view of the chest     COMPARISON: 3/9/2024     FINDINGS: The heart size is normal. Aorta is calcified. Pulmonary  vasculature is unremarkable. Old granulomatous disease is apparent.  Sternotomy  wires are noted. No focal pulmonary consolidation, pleural  effusion, or pneumothorax. No acute osseous process.       Impression:      No focal pulmonary consolidation. Follow-up as clinical  indications persist.     This report was finalized on 1/28/2025 1:45 PM by Dr. Mukesh Bruce M.D on Workstation: [id number]                 Assessment:  Active Hospital Problems    Diagnosis  POA    **General weakness [R53.1]  Yes      Resolved Hospital Problems   No resolved problems to display.   Parkinsons disease  A flutter  Cad  Hypertension  Hyperlipidemia  Underweight  Hyperglycemia  polycythemia    Plan:  Will ask pt/ot to see  Fluids  Trend labs  Monitor on telemetry  Dw patient and ed provider  Patient is dnr and wife is nok  Angy James Bond MD  1/28/2025  23:38 EST

## 2025-01-29 NOTE — PLAN OF CARE
Goal Outcome Evaluation:              Outcome Evaluation: Clinical swallow evaluation completed recommend mechanical soft ground no mixed consistencies and NTL. Meds crushed with puree. Small bites and sips, alternate liquids and solids and double swallows. Okay for water protocol.

## 2025-01-29 NOTE — CONSULTS
Neurology Consult Note  Consult Date: 1/29/2025  Referring MD: Angy Bond, *  Reason for Consult I have been asked to see the patient in neurological consultation to render advice and opinion regarding generalized weakness.    Madhu Santoro is a 79 y.o. male with past medical history of peripheral neuropathy, atrial flutter on Eliquis, recent diagnosis of Parkinson's disease in June 2024 followed by Dr. Mukesh Russell in our outpatient office who presented to the hospital on 1/28/2025 with complaints of progressive generalized weakness. He states about 4 to 5 days ago he noticed that he was starting to become progressively weak.  Typically at baseline he uses a rolling walker or cane at home.  Over the last couple of days he has been having difficulty getting up out of his bed and walking.  He states once he comes to stand it takes him a while to initiate his walking.  He has been holding onto furniture to assist him.  Currently he is taking carbidopa/levodopa  mg 1 tab 3 times daily.  When he was initially diagnosed in June he was placed on  mg 1 tab 3 times daily.  This was increased to  mg 1 tab 3 times daily in December.  He states he has not noticed any improvement of symptoms since the change.  He has not necessarily noticed any worsening dysphagia.  He feels he is moving slower.  Denies any dizziness or lightheadedness.  He has been having some chills and mild coughing recently.  He also reports feeling a burning sensation when he would urinate and has noticed more urinary incontinence.  Denies any new neck or back pain.  Feels his neck has had some discomfort due to the positioning of his head on his pillow.  He did have full neural axis imaging in January 2024 that was essentially negative, bulging disc at L3-4 and 4-5 mildly narrowing the canal.    Past Medical/Surgical Hx:  Past Medical History:   Diagnosis Date    Anxiety     Arthritis     Atrial flutter     Status post  "cavotricuspid isthmus ablation by Dr. Gustafson on 4/18/17    Mandel esophagus     Benign essential hypertension     CAD (coronary artery disease)     3 vessel CABG 4/11/17 by Dr. Cortez: ROSARIO-prox LAD, SVG-OM1, SVG-OM3    Carotid artery disease     Status post carotid endarterectomy - [facility] 4/10/17: 50-59% NICHELLE, 1-15% LICA.     Colonic polyp     COVID-19 long hauler     Cyst of pancreas     DDD (degenerative disc disease), lumbosacral     GERD (gastroesophageal reflux disease)     H/O bone density study 2013    H/O complete eye exam 2014    History of kidney stone     HLD (hyperlipidemia)     Hypertension     Kidney stone     8/22/22    Lipid screening 05/31/2013    Low back pain     physical therapy [facility] [place] 5-12-10    Parkinson disease     Screening for prostate cancer 07/07/2015    Skin cancer     nose    Stroke     RESIDUAL--\"BALANCE ISSUES\"     Past Surgical History:   Procedure Laterality Date    CARDIAC CATHETERIZATION N/A 04/10/2017    Procedure: Left Heart Cath;  Surgeon: Marjorie Healy MD;  Location: [facility] CATH INVASIVE LOCATION;  Service:     CARDIAC CATHETERIZATION N/A 04/10/2017    Procedure: Coronary angiography;  Surgeon: Marjorie Healy MD;  Location:  [facility] CATH INVASIVE LOCATION;  Service:     CARDIAC CATHETERIZATION N/A 04/10/2017    Procedure: Left ventriculography;  Surgeon: Marjorie Healy MD;  Location: [facility] CATH INVASIVE LOCATION;  Service:     CARDIAC CATHETERIZATION  2011    CARDIAC ELECTROPHYSIOLOGY PROCEDURE N/A 04/18/2017    Procedure: Ablation atrial flutter;  Surgeon: Jose Antonio Gustafson MD;  Location: [facility] CATH INVASIVE LOCATION;  Service:     CAROTID ENDARTERECTOMY      CHOLECYSTECTOMY      CHOLECYSTECTOMY WITH INTRAOPERATIVE CHOLANGIOGRAM N/A 09/07/2019    Procedure: Laparoscopic cholecystectomy with intraoperative cholangiogram;  Surgeon: Gauri Travis MD;  Location: [facility] MAIN OR;  Service: General    COLONOSCOPY  01/06/2015    Diverticulosis, one TA    COLONOSCOPY " N/A 02/14/2019    tics, NBIH, adenomatous polyp x 2    COLONOSCOPY N/A 11/05/2021    Procedure: COLONOSCOPY TO CECUM AND TERM. ILEUM WITH COLD POLYPECTOMIES;  Surgeon: Everton Abel MD;  [facility] END[facility];  Service: Gastroenterology;  Laterality: N/A;  PRE OP - PERS H/O POLYPS  POST OP - COLON POLYPS,, DIVERTICULOSIS, HEMORRHOIDS    CORONARY ARTERY BYPASS GRAFT N/A 04/11/2017    Procedure: RA STERNOTOMY CORONARY ARTERY BYPASS GRAFT TIMES 3 USING LEFT INTERNAL MAMMARY ARTERY AND LEFT GREATER SAPHENOUS VEIN GRAFT PER ENDOSCOPIC VEIN HARVESTING AND PRP ;  Surgeon: Temo Cortez MD;  Location: [facility] MAIN OR;  Service:     ENDOSCOPY  01/06/2015    HH, Ervin's esophagus    ENDOSCOPY N/A 02/14/2019    Z line irregular, HH, Ervin's esophagus    ENDOSCOPY N/A 11/05/2021    Procedure: ESOPHAGOGASTRODUODENOSCOPY WITH BIOPSIES;  Surgeon: Everton Abel MD;  Location[facility] END[facility];  Service: Gastroenterology;  Laterality: N/A;  PRE OP - PERS H/O ERVIN'S  POST OP - IRREG Z LINE    ENDOSCOPY W/ PEG TUBE PLACEMENT N/A 11/6/2023    Procedure: ESOPHAGOGASTRODUODENOSCOPY WITH PERCUTANEOUS ENDOSCOPIC GASTROSTOMY TUBE INSERTION;  Surgeon: Temo Ramsey MD;  Location: [facility] ENDOSCOPY;  Service: General;  Laterality: N/A;  Pre: dysphagia  Post: same    KNEE SURGERY Left     URETEROSCOPY LASER LITHOTRIPSY WITH STENT INSERTION Left 8/23/2022    Procedure: Cysto retrograde with left uretro stent placement;  Surgeon: Damien Oliveira MD;  Location: [facility] MAIN OR;  Service: Urology;  Laterality: Left;    VASECTOMY       Medications On Admission  Facility-Administered Medications Prior to Admission   Medication Dose Route Frequency Provider Last Rate Last Admin    cyanocobalamin injection 1,000 mcg  1,000 mcg Intramuscular Q30 Days Damian Sanchez MD   1,000 mcg at 04/06/23 0957     Medications Prior to Admission   Medication Sig Dispense Refill Last Dose/Taking    allopurinol (ZYLOPRIM) 300 MG  tablet TAKE 1 TABLET BY MOUTH DAILY 90 tablet 0 Taking    apixaban (ELIQUIS) 2.5 MG tablet tablet Take 1 tablet by mouth Every 12 (Twelve) Hours. Indications: Atrial Fibrillation   Taking    carbidopa-levodopa (Sinemet)  MG per tablet Take 1 tablet by mouth 3 (Three) Times a Day. 90 tablet 2 Taking    HYDROcodone-acetaminophen (NORCO) 5-325 MG per tablet Take 1 tablet by mouth 3 (Three) Times a Day As Needed for Severe Pain or Moderate Pain. 30 day supply. 90 tablet 0 Taking As Needed    hydroxyurea (Hydrea) 500 MG capsule Take 1 capsule by mouth Daily. 30 capsule 1 Taking    lisinopril (PRINIVIL,ZESTRIL) 5 MG tablet Take 1 tablet by mouth Daily. 30 tablet 11 Taking    rosuvastatin (Crestor) 20 MG tablet Take 1 tablet by mouth Every Night. Indications: Cerebrovascular Accident or Stroke 90 tablet 1 Taking    cephalexin (KEFLEX) 500 MG capsule Take 1 capsule by mouth Every 12 (Twelve) Hours.   Unknown    gabapentin (NEURONTIN) 100 MG capsule TAKE ONE CAPSULE BY MOUTH TWICE DAILY 60 capsule 0 Unknown    Nutritional Supplements (OSMOLITE 1.5 GIANNI PO) 240 Units/oz/day by Gastrostomy Tube route 6 (Six) Times a Day.   Unknown    RABEprazole (ACIPHEX) 20 MG EC tablet TAKE 1 TABLET BY MOUTH DAILY 90 tablet 0 Unknown     Allergies:  Allergies   Allergen Reactions    Atorvastatin Myalgia     Myalgia    Penicillins Hives, Swelling and Rash     Tolerates cephalosporins      Social Hx:  Social History     Socioeconomic History    Marital status:      Spouse name: Brooke    Years of education: 9th grade   Tobacco Use    Smoking status: Former     Current packs/day: 0.00     Average packs/day: 1 pack/day for 50.0 years (50.0 ttl pk-yrs)     Types: Cigarettes     Start date: 1959     Quit date: 2009     Years since quittin.0     Passive exposure: Past    Smokeless tobacco: Never    Tobacco comments:     CAFFEINE USE   Vaping Use    Vaping status: Never Used   Substance and Sexual Activity    Alcohol use:  "Not Currently     Comment: rarely    Drug use: No    Sexual activity: Defer     Partners: Female     Family Hx:  Family History   Problem Relation Age of Onset    Hypertension Mother     Heart disease Mother     Heart attack Mother     Stroke Mother     Heart disease Father     Heart attack Father     Stroke Father     Hypertension Sister     Heart attack Brother     Heart disease Brother     No Known Problems Brother     Heart disease Brother     Heart attack Brother     Diabetes Brother     No Known Problems Maternal Grandmother     No Known Problems Maternal Grandfather     No Known Problems Paternal Grandmother     No Known Problems Paternal Grandfather     Pancreatic cancer Paternal Cousin     Arthritis Other     Diabetes Other     Hypertension Other     Kidney disease Other         stones    Malig Hyperthermia Neg Hx      Review of Systems   Musculoskeletal:  Positive for gait problem.   Neurological:  Positive for tremors, speech difficulty and weakness.     Exam  /73 (BP Location: Left arm, Patient Position: Lying)   Pulse 81   Temp 98.1 °F (36.7 °C) (Oral)   Resp 18   Ht 172.7 cm (68\")   Wt 63 kg (138 lb 14.2 oz)   SpO2 97%   BMI 21.12 kg/m²     Current Facility-Administered Medications:     acetaminophen (TYLENOL) tablet 650 mg, 650 mg, Per PEG Tube, Q4H PRN **OR** acetaminophen (TYLENOL) 160 MG/5ML oral solution 650 mg, 650 mg, Per PEG Tube, Q4H PRN **OR** acetaminophen (TYLENOL) suppository 650 mg, 650 mg, Rectal, Q4H PRN, Angy Bond MD    allopurinol (ZYLOPRIM) tablet 300 mg, 300 mg, Oral, Daily, Angy Bond MD, 300 mg at 01/29/25 0947    apixaban (ELIQUIS) tablet 2.5 mg, 2.5 mg, Oral, Q12H, Angy Bond MD, 2.5 mg at 01/29/25 0952    sennosides-docusate (PERICOLACE) 8.6-50 MG per tablet 2 tablet, 2 tablet, Per PEG Tube, BID PRN **AND** polyethylene glycol (MIRALAX) packet 17 g, 17 g, Per PEG Tube, Daily PRN **AND** bisacodyl (DULCOLAX) EC tablet 5 mg, 5 mg, " Oral, Daily PRN **AND** bisacodyl (DULCOLAX) suppository 10 mg, 10 mg, Rectal, Daily PRN, Angy Bond MD    carbidopa-levodopa (SINEMET)  MG per tablet 1 tablet, 1 tablet, Oral, TID, Angy Bond MD, 1 tablet at 01/29/25 0948    HYDROcodone-acetaminophen (NORCO) 5-325 MG per tablet 1 tablet, 1 tablet, Oral, TID PRN, Angy Bond MD, 1 tablet at 01/29/25 0046    hydroxyurea (HYDREA) capsule 500 mg, 500 mg, Oral, Daily, Angy Bond MD, 500 mg at 01/29/25 0948    lisinopril (PRINIVIL,ZESTRIL) tablet 5 mg, 5 mg, Oral, Daily, Angy Bond MD, 5 mg at 01/29/25 0947    ondansetron (ZOFRAN) injection 4 mg, 4 mg, Intravenous, Q6H PRN, Angy Bond MD    pantoprazole (PROTONIX) EC tablet 40 mg, 40 mg, Oral, Q AM, Angy Bond MD, 40 mg at 01/29/25 0450    rosuvastatin (CRESTOR) tablet 20 mg, 20 mg, Oral, Nightly, Angy Bond MD, 20 mg at 01/29/25 0044    sodium chloride 0.9 % infusion, 75 mL/hr, Intravenous, Continuous, Angy Bond MD, Last Rate: 75 mL/hr at 01/29/25 0043, 75 mL/hr at 01/29/25 0043    PRN meds    acetaminophen **OR** acetaminophen **OR** acetaminophen    senna-docusate sodium **AND** polyethylene glycol **AND** bisacodyl **AND** bisacodyl    HYDROcodone-acetaminophen    ondansetron    No current facility-administered medications on file prior to encounter.     Current Outpatient Medications on File Prior to Encounter   Medication Sig    allopurinol (ZYLOPRIM) 300 MG tablet TAKE 1 TABLET BY MOUTH DAILY    apixaban (ELIQUIS) 2.5 MG tablet tablet Take 1 tablet by mouth Every 12 (Twelve) Hours. Indications: Atrial Fibrillation    carbidopa-levodopa (Sinemet)  MG per tablet Take 1 tablet by mouth 3 (Three) Times a Day.    HYDROcodone-acetaminophen (NORCO) 5-325 MG per tablet Take 1 tablet by mouth 3 (Three) Times a Day As Needed for Severe Pain or Moderate Pain. 30 day supply.    hydroxyurea (Hydrea) 500 MG  capsule Take 1 capsule by mouth Daily.    lisinopril (PRINIVIL,ZESTRIL) 5 MG tablet Take 1 tablet by mouth Daily.    rosuvastatin (Crestor) 20 MG tablet Take 1 tablet by mouth Every Night. Indications: Cerebrovascular Accident or Stroke    cephalexin (KEFLEX) 500 MG capsule Take 1 capsule by mouth Every 12 (Twelve) Hours.    gabapentin (NEURONTIN) 100 MG capsule TAKE ONE CAPSULE BY MOUTH TWICE DAILY    Nutritional Supplements (OSMOLITE 1.5 GIANNI PO) 240 Units/oz/day by Gastrostomy Tube route 6 (Six) Times a Day.    RABEprazole (ACIPHEX) 20 MG EC tablet TAKE 1 TABLET BY MOUTH DAILY       General appearance: Elderly male, masked face, NAD, alert and cooperative  HEENT: Normocephalic, atraumatic    Neurological:   MS: oriented x3, normal attention/concentration, moderate hypophonia, no neglect, normal fund of knowledge  CN: visual fields full, EOMI, facial sensation equal, no facial droop, shoulder shrug equal, tongue midline  Motor: 5/5 upper extremities, 4/5 lower extremities more distally-unable to hold up against resistance but able to hold against gravity >5 seconds, bradykinetic movements throughout, mild rigidity of RUE, mild tremor of middle and ring finger on the right and thumb on the left  Reflexes: 2+ in all 4 ext.  Sensory: Absent vibratory sensation o in foot, diminished cold temperature sensation of the left legf the right leg  Gait and station: slow shuffled steps    Laboratory results:  Lab Results   Component Value Date    GLUCOSE 95 01/29/2025    CALCIUM 9.3 01/29/2025     01/29/2025    K 4.7 01/29/2025    CO2 23.0 01/29/2025     01/29/2025    BUN 16 01/29/2025    CREATININE 1.05 01/29/2025    EGFRIFAFRI 75 02/08/2022    EGFRIFNONA 65 02/08/2022    BCR 15.2 01/29/2025    ANIONGAP 9.0 01/29/2025     Lab Results   Component Value Date    WBC 26.40 (H) 01/29/2025    HGB 13.8 01/29/2025    HCT 50.8 01/29/2025    MCV 72.5 (L) 01/29/2025     (H) 01/29/2025     Lab Results   Component Value  Date    CHOL 106 04/12/2021    CHOL 138 04/10/2017     Lab Results   Component Value Date    HDL 38 (L) 07/03/2023    HDL 38 (L) 01/06/2023    HDL 42 08/10/2021     Lab Results   Component Value Date    LDL 58 07/03/2023    LDL 63 01/06/2023    LDL 68 08/10/2021     Lab Results   Component Value Date    TRIG 83 07/03/2023    TRIG 96 01/06/2023    TRIG 66 08/11/2022     Lab Results   Component Value Date    HGBA1C 5.50 11/04/2023     Lab Results   Component Value Date    INR 1.34 (H) 01/07/2024    INR 1.13 (H) 11/25/2023    INR 1.42 (H) 02/19/2023    PROTIME 16.8 (H) 01/07/2024    PROTIME 14.6 (H) 11/25/2023    PROTIME 17.6 (H) 02/19/2023     Lab Results   Component Value Date    CTMFUNEW11 752 01/10/2024     Lab Results   Component Value Date    TSH 3.710 01/28/2025     Pain Management Panel  More data exists         Latest Ref Rng & Units 10/31/2024 4/22/2024   Pain Management Panel   Amphetamine, Urine Qual Negative Negative  Negative    Barbiturates Screen, Urine Negative Negative  Negative    Benzodiazepine Screen, Urine Negative Negative  Negative    Cocaine Screen, Urine Negative Negative  Negative    Methadone Screen , Urine Negative Negative  Negative       Details                  Brief Urine Lab Results  (Last result in the past 365 days)        Color   Clarity   Blood   Leuk Est   Nitrite   Protein   CREAT   Urine HCG        01/28/25 1623 Yellow   Cloudy   Negative   Negative   Negative   100 mg/dL (2+)                   Lab review: I personally reviewed all labs as documented above.    Imaging review:   MRI C/T/L spine WO 1/2024: Bulging disc L3-4, 4-5 resulting in mild narrowing.    Diagnosis:  Idiopathic Parkinson's disease  Peripheral neuropathy  Leukocytosis  Thrombocytosis  Dysuria and incontinence    Comment: 79-year-old male who presented with complaints of worsening generalized weakness who has a recent underlying diagnosis of Parkinson's disease.  He has been on carbidopa/levodopa  mg 1  tab 3 times daily since December and has not noticed any improvement of his PD symptoms. On exam he is bradykinetic, some rigidity of his RUE, worsening weakness of his legs distally, and short slow shuffling steps.  It also sounds like he has been having some freezing episodes.  Will increase his carbidopa/levodopa to 1 tab 4 times daily today and see how he responds.  Could consider adding on Comtan but will evaluate him in the morning.  Will also check a urinalysis for concern for UTI which could also exacerbate his PD symptoms.  He describes some chills and URI type symptoms as well over the last few weeks.  His respiratory panel was negative. Certainly his moderate/severe peripheral neuropathy could also contribute to his weakness and gait imbalance.     PLAN:   Increase carbidopa/levodopa  mg to 1 tab 4 times daily.  Discussed with pharmacist will give a dose now.  Check urinalysis  Check B12 level off of specimen in lab. His TSH was normal.  PT/OT to see and evaluate. Would like ST as well as he does describe some mild dysphagia.   He is followed by [facility] for his chronic leukocytosis/thrombocytosis and was recently seen by them in the outpatient setting on 1/21.   Will follow.    Case discussed with patient, RN, pharmacist Dr. Gabriel Salgado, and Dr. Gomez and she agrees with plan above.    MY Triana

## 2025-01-30 LAB
ANION GAP SERPL CALCULATED.3IONS-SCNC: 8.4 MMOL/L (ref 5–15)
BUN SERPL-MCNC: 17 MG/DL (ref 8–23)
BUN/CREAT SERPL: 17 (ref 7–25)
CALCIUM SPEC-SCNC: 8.9 MG/DL (ref 8.6–10.5)
CHLORIDE SERPL-SCNC: 108 MMOL/L (ref 98–107)
CO2 SERPL-SCNC: 24.6 MMOL/L (ref 22–29)
CREAT SERPL-MCNC: 1 MG/DL (ref 0.76–1.27)
DEPRECATED RDW RBC AUTO: 50.6 FL (ref 37–54)
EGFRCR SERPLBLD CKD-EPI 2021: 76.1 ML/MIN/1.73
ERYTHROCYTE [DISTWIDTH] IN BLOOD BY AUTOMATED COUNT: 21.6 % (ref 12.3–15.4)
GLUCOSE SERPL-MCNC: 98 MG/DL (ref 65–99)
HCT VFR BLD AUTO: 46.2 % (ref 37.5–51)
HGB BLD-MCNC: 12.8 G/DL (ref 13–17.7)
MCH RBC QN AUTO: 19.9 PG (ref 26.6–33)
MCHC RBC AUTO-ENTMCNC: 27.7 G/DL (ref 31.5–35.7)
MCV RBC AUTO: 71.9 FL (ref 79–97)
PLATELET # BLD AUTO: 588 10*3/MM3 (ref 140–450)
PMV BLD AUTO: 10 FL (ref 6–12)
POTASSIUM SERPL-SCNC: 3.8 MMOL/L (ref 3.5–5.2)
RBC # BLD AUTO: 6.43 10*6/MM3 (ref 4.14–5.8)
SODIUM SERPL-SCNC: 141 MMOL/L (ref 136–145)
WBC NRBC COR # BLD AUTO: 19.06 10*3/MM3 (ref 3.4–10.8)

## 2025-01-30 PROCEDURE — 92526 ORAL FUNCTION THERAPY: CPT

## 2025-01-30 PROCEDURE — 80048 BASIC METABOLIC PNL TOTAL CA: CPT | Performed by: STUDENT IN AN ORGANIZED HEALTH CARE EDUCATION/TRAINING PROGRAM

## 2025-01-30 PROCEDURE — 99232 SBSQ HOSP IP/OBS MODERATE 35: CPT | Performed by: NURSE PRACTITIONER

## 2025-01-30 PROCEDURE — 97110 THERAPEUTIC EXERCISES: CPT

## 2025-01-30 PROCEDURE — 85027 COMPLETE CBC AUTOMATED: CPT | Performed by: STUDENT IN AN ORGANIZED HEALTH CARE EDUCATION/TRAINING PROGRAM

## 2025-01-30 RX ORDER — ENTACAPONE 200 MG/1
200 TABLET ORAL 4 TIMES DAILY
Status: DISCONTINUED | OUTPATIENT
Start: 2025-01-30 | End: 2025-02-02 | Stop reason: HOSPADM

## 2025-01-30 RX ADMIN — ENTACAPONE 200 MG: 200 TABLET, FILM COATED ORAL at 20:52

## 2025-01-30 RX ADMIN — CARBIDOPA AND LEVODOPA 1 TABLET: 25; 250 TABLET ORAL at 00:29

## 2025-01-30 RX ADMIN — LISINOPRIL 5 MG: 5 TABLET ORAL at 09:33

## 2025-01-30 RX ADMIN — ALLOPURINOL 300 MG: 300 TABLET ORAL at 09:33

## 2025-01-30 RX ADMIN — HYDROXYUREA 500 MG: 500 CAPSULE ORAL at 09:34

## 2025-01-30 RX ADMIN — CARBIDOPA AND LEVODOPA 1 TABLET: 25; 250 TABLET ORAL at 13:15

## 2025-01-30 RX ADMIN — PANTOPRAZOLE SODIUM 40 MG: 40 TABLET, DELAYED RELEASE ORAL at 04:48

## 2025-01-30 RX ADMIN — APIXABAN 2.5 MG: 2.5 TABLET, FILM COATED ORAL at 20:52

## 2025-01-30 RX ADMIN — CARBIDOPA AND LEVODOPA 1 TABLET: 25; 250 TABLET ORAL at 09:33

## 2025-01-30 RX ADMIN — ROSUVASTATIN CALCIUM 20 MG: 20 TABLET, FILM COATED ORAL at 20:52

## 2025-01-30 RX ADMIN — CARBIDOPA AND LEVODOPA 1 TABLET: 25; 250 TABLET ORAL at 20:52

## 2025-01-30 RX ADMIN — APIXABAN 2.5 MG: 2.5 TABLET, FILM COATED ORAL at 09:33

## 2025-01-30 RX ADMIN — CARBIDOPA AND LEVODOPA 1 TABLET: 25; 250 TABLET ORAL at 16:07

## 2025-01-30 RX ADMIN — ENTACAPONE 200 MG: 200 TABLET, FILM COATED ORAL at 16:07

## 2025-01-30 RX ADMIN — HYDROCODONE BITARTRATE AND ACETAMINOPHEN 1 TABLET: 5; 325 TABLET ORAL at 09:34

## 2025-01-30 NOTE — PLAN OF CARE
Goal Outcome Evaluation:         Patient alert and oriented, o2 2L, last BM 01/29/25, brief in place, NS @75, up w/assist x2 but highly unsteady. Pt reports difficult swallowing, gtube to abdomen w/ nutren 2.0 w/190ml bolus @HS and 25ml flush before and after bolus. Diet changed to soft to chew w/nectar thisck liquids. Meds crushed in applesauce. Plan of care is ongoing.

## 2025-01-30 NOTE — THERAPY TREATMENT NOTE
Patient Name: Madhu Santoro  : 1945    MRN: [id number]                              Today's Date: 2025       Admit Date: 2025    Visit Dx:     ICD-10-CM ICD-9-CM   1. General weakness  R53.1 780.79   2. Difficulty walking  R26.2 719.7   3. Parkinson's disease, unspecified whether dyskinesia present, unspecified whether manifestations fluctuate  G20.A1 332.0   4. Viral URI with cough  J06.9 465.9   5. Dysuria  R30.0 788.1   6. Hyperglycemia  R73.9 790.29   7. Elevated troponin  R79.89 790.6   8. Polycythemia vera  D45 238.4     Patient Active Problem List   Diagnosis    Anxiety    Arthritis    Coronary artery disease due to lipid rich plaque    HLD (hyperlipidemia)    Benign essential hypertension    DDD (degenerative disc disease), lumbosacral    Cerebrovascular accident    Encounter for screening for cardiovascular disorders    Gastroesophageal reflux disease    Hyperlipidemia    Stenosis of carotid artery    Former smoker    History of cardiac catheterization    S/P ablation of atrial flutter    Typical atrial flutter    S/P CABG (coronary artery bypass graft)    Adenomatous polyp of ascending colon    Abdominal bloating    Stroke    PAD (peripheral artery disease)    Acute cholecystitis    Right upper quadrant abdominal pain    Chronic pain of both hips    Chronic bilateral low back pain without sciatica    Sacroiliac joint dysfunction of both sides    Encounter for long-term (current) use of high-risk medication    Lumbar facet arthropathy    Stroke-like symptoms    CVA (cerebral vascular accident)    Personal history of colonic polyps    Arthralgia of multiple joints    Iron deficiency    Thrombocytosis    Left ureteral stone    Obstructive uropathy    Chronic anticoagulation    Facial skin lesion    Iron deficiency anemia    Polycythemia    Neuropathy    Weakness    Pneumonia    Close exposure to COVID-19 virus    Polycythemia vera    Gout    COVID-19    Cytokine release syndrome, grade 2  "   Anemia    History of CVA (cerebrovascular accident)    S/P percutaneous endoscopic gastrostomy (PEG) tube placement    Mandel esophagus    Sacral decubitus ulcer    Oral phase dysphagia    Acute blood loss anemia    Orthostatic dizziness    Orthostatic hypotension    Bilateral leg weakness    General weakness    Generalized weakness     Past Medical History:   Diagnosis Date    Anxiety     Arthritis     Atrial flutter     Status post cavotricuspid isthmus ablation by Dr. Gustafson on 4/18/17    Mandel esophagus     Benign essential hypertension     CAD (coronary artery disease)     3 vessel CABG 4/11/17 by Dr. Cortez: ROSARIO-prox LAD, SVG-OM1, SVG-OM3    Carotid artery disease     Status post carotid endarterectomy - USG 4/10/17: 50-59% NICHELLE, 1-15% LICA.     Colonic polyp     COVID-19 long hauler     Cyst of pancreas     DDD (degenerative disc disease), lumbosacral     GERD (gastroesophageal reflux disease)     H/O bone density study 2013    H/O complete eye exam 2014    History of kidney stone     HLD (hyperlipidemia)     Hypertension     Kidney stone     8/22/22    Lipid screening 05/31/2013    Low back pain     physical [facility]12[facility]10    Parkinson disease     Screening for prostate cancer 07/07/2015    Skin cancer     nose    Stroke     RESIDUAL--\"BALANCE ISSUES\"     Past Surgical History:   Procedure Laterality Date    CARDIAC CATHETERIZATION N/A 04/10/2017    Procedure: Left Heart Cath;  Surgeon: Marjorie Haely MD;  Location:  [facility] C[facility]VASIVE [facility];  Service:     CARDIAC CATHETERIZATION N/A 04/10/2017    Procedure: Coronary angiography;  Surgeon: Marjorie Healy MD;  Location:  [facility];  Service:     CARDIAC CATHETERIZATION N/A 04/10/2017    Procedure: Left ventriculography;  Surgeon: Marjorie Healy MD;  Location:  [facility];  Service:     CARDIAC CATHETERIZATION  2011    CARDIAC ELECTROPHYSIOLOGY PROCEDURE N/A 04/18/2017    Procedure: Ablation " atrial flutter;  Surgeon: Jose Antonio Gustafson MD;  Location: [facility] CATH INVASIVE LOCATION;  Service:     CAROTID ENDARTERECTOMY      CHOLECYSTECTOMY      CHOLECYSTECTOMY WITH INTRAOPERATIVE CHOLANGIOGRAM N/A 09/07/2019    Procedure: Laparoscopic cholecystectomy with intraoperative cholangiogram;  Surgeon: Gauri Travis MD;  Location: [facility] MAIN OR;  Service: General    COLONOSCOPY  01/06/2015    Diverticulosis, one TA    COLONOSCOPY N/A 02/14/2019    tics, NBIH, adenomatous polyp x 2    COLONOSCOPY N/A 11/05/2021    Procedure: COLONOSCOPY TO CECUM AND TERM. ILEUM WITH COLD POLYPECTOMIES;  Surgeon: Everton Abel MD;  Location: [facility] ENDOSCOPY;  Service: Gastroenterology;  Laterality: N/A;  PRE OP - PERS H/O POLYPS  POST OP - COLON POLYPS,, DIVERTICULOSIS, HEMORRHOIDS    CORONARY ARTERY BYPASS GRAFT N/A 04/11/2017    Procedure: AR STERNOTOMY CORONARY ARTERY BYPASS GRAFT TIMES 3 USING LEFT INTERNAL MAMMARY ARTERY AND LEFT GREATER SAPHENOUS VEIN GRAFT PER ENDOSCOPIC VEIN HARVESTING AND PRP ;  Surgeon: Temo Cortez MD;  Location: [facility] MAIN OR;  Service:     ENDOSCOPY  01/06/2015    HH, Ervin's esophagus    ENDOSCOPY N/A 02/14/2019    Z line irregular, HH, Ervin's esophagus    ENDOSCOPY N/A 11/05/2021    Procedure: ESOPHAGOGASTRODUODENOSCOPY WITH BIOPSIES;  Surgeon: Everton Abel MD;  Location: [facility] ENDOSCOPY;  Service: Gastroenterology;  Laterality: N/A;  PRE OP - PERS H/O ERVIN'S  POST OP - IRREG Z LINE    ENDOSCOPY W/ PEG TUBE PLACEMENT N/A 11/6/2023    Procedure: ESOPHAGOGASTRODUODENOSCOPY WITH PERCUTANEOUS ENDOSCOPIC GASTROSTOMY TUBE INSERTION;  Surgeon: Temo Ramsey MD;  Location: [facility] ENDOSCOPY;  Service: General;  Laterality: N/A;  Pre: dysphagia  Post: same    KNEE SURGERY Left     URETEROSCOPY LASER LITHOTRIPSY WITH STENT INSERTION Left 8/23/2022    Procedure: Cysto retrograde with left uretro stent placement;  Surgeon: Damien Oliveira MD;  Location: [facility] MAIN OR;   Service: Urology;  Laterality: Left;    VASECTOMY        General Information       Row Name 01/30/25 1527          OT Time and Intention    Document Type therapy note (daily note)  -PP     Mode of Treatment occupational therapy  -PP     Patient Effort good  -PP       Row Name 01/30/25 1527          General Information    Patient Profile Reviewed yes  -PP     Existing Precautions/Restrictions fall  -PP     Barriers to Rehab previous functional deficit  -PP       Row Name 01/30/25 1527          Cognition    Orientation Status (Cognition) oriented x 4  -PP       Row Name 01/30/25 1527          Safety Issues/Impairments Affecting Functional Mobility    Impairments Affecting Function (Mobility) balance;endurance/activity tolerance;strength  -PP               User Key  (r) = Recorded By, (t) = Taken By, (c) = Cosigned By      Initials Name Provider Type    PP Arleen Nava OT Occupational Therapist                     Mobility/ADL's       Row Name 01/30/25 1527          Bed Mobility    Comment, (Bed Mobility) NT, UIC at start/end of session  -PP               User Key  (r) = Recorded By, (t) = Taken By, (c) = Cosigned By      Initials Name Provider Type    PP Arleen Nava OT Occupational Therapist                   Obj/Interventions       Row Name 01/30/25 1528          Shoulder (Therapeutic Exercise)    Shoulder (Therapeutic Exercise) AROM (active range of motion)  -PP     Shoulder AROM (Therapeutic Exercise) bilateral;flexion;extension;aBduction;scapular elevation;scapular protraction;scapular retraction;sitting;3 sets;10 repetitions  -PP       Row Name 01/30/25 1528          Elbow/Forearm (Therapeutic Exercise)    Elbow/Forearm (Therapeutic Exercise) AROM (active range of motion)  -PP     Elbow/Forearm AROM (Therapeutic Exercise) bilateral;flexion;extension;sitting;10 repetitions;3 sets  -PP       Row Name 01/30/25 1528          Wrist (Therapeutic Exercise)    Wrist (Therapeutic Exercise) AROM (active  range of motion)  -PP     Wrist AROM (Therapeutic Exercise) bilateral;flexion;extension;10 repetitions;3 sets  -PP       Row Name 01/30/25 1528          Hand (Therapeutic Exercise)    Hand (Therapeutic Exercise) AROM (active range of motion)  -PP     Hand AROM/AAROM (Therapeutic Exercise) bilateral;finger flexion;finger extension;10 repetitions;3 sets  -PP       Row Name 01/30/25 1528          Motor Skills    Functional Endurance improving, rest breaks given as needed  -PP     Therapeutic Exercise shoulder;elbow/forearm;wrist;hand  -PP               User Key  (r) = Recorded By, (t) = Taken By, (c) = Cosigned By      Initials Name Provider Type    PP Arleen Nava, OT Occupational Therapist                   Goals/Plan    No documentation.                  Clinical Impression       Row Name 01/30/25 1529          Pain Assessment    Pretreatment Pain Rating 0/10 - no pain  -PP     Posttreatment Pain Rating 0/10 - no pain  -PP     Pain Side/Orientation generalized  -PP       Row Name 01/30/25 1529          Plan of Care Review    Plan of Care Reviewed With patient  -PP     Progress improving  -PP     Outcome Evaluation Pt seen for OT session this pm, pleasant and eager to participate. Pt UIC and agreeable to BUE AROM in all major planes 3x10 reps while sitting with (I) to work on increasing strength and fxnl reach during ADLs and xfers. Rest breaks given as pt needed. Pt tolerated exercises well. Plan for dc to home with assist and HH. OT will continue to monitor.  -PP       Row Name 01/30/25 1529          Therapy Plan Review/Discharge Plan (OT)    Anticipated Discharge Disposition (OT) home with assist;home with home health  -PP       Row Name 01/30/25 1529          Vital Signs    O2 Delivery Pre Treatment room air  -PP       Row Name 01/30/25 1529          Positioning and Restraints    Pre-Treatment Position sitting in chair/recliner  -PP     Post Treatment Position chair  -PP     In Chair call light within  reach;encouraged to call for assist;exit alarm on;reclined  -PP               User Key  (r) = Recorded By, (t) = Taken By, (c) = Cosigned By      Initials Name Provider Type    Arleen Ray OT Occupational Therapist                   Outcome Measures       Row Name 01/30/25 1537          How much help from another is currently needed...    Putting on and taking off regular lower body clothing? 3  -PP     Bathing (including washing, rinsing, and drying) 3  -PP     Toileting (which includes using toilet bed pan or urinal) 3  -PP     Putting on and taking off regular upper body clothing 4  -PP     Taking care of personal grooming (such as brushing teeth) 4  -PP     Eating meals 4  -PP     AM-PAC 6 Clicks Score (OT) 21  -PP       Row Name 01/30/25 0934          How much help from another person do you currently need...    Turning from your back to your side while in flat bed without using bedrails? 3  -SS     Moving from lying on back to sitting on the side of a flat bed without bedrails? 3  -SS     Moving to and from a bed to a chair (including a wheelchair)? 3  -SS     Standing up from a chair using your arms (e.g., wheelchair, bedside chair)? 3  -SS     Climbing 3-5 steps with a railing? 2  -SS     To walk in hospital room? 2  -SS     AM-PAC 6 Clicks Score (PT) 16  -SS     Highest Level of Mobility Goal 5 --> Static standing  -SS       Row Name 01/30/25 1537          Modified [place]ren Scale    Modified [place] Scale 2 - Slight disability.  Unable to carry out all previous activities but able to look after own affairs without assistance.  -PP               User Key  (r) = Recorded By, (t) = Taken By, (c) = Cosigned By      Initials Name Provider Type    Elvia Bell, RN Registered Nurse    Arleen Ray OT Occupational Therapist                    Occupational Therapy Education       Title: PT OT SLP Therapies (In Progress)       Topic: Occupational Therapy (In Progress)       Point: ADL training  (Done)       Description:   Instruct learner(s) on proper safety adaptation and remediation techniques during self care or transfers.   Instruct in proper use of assistive devices.                  Learning Progress Summary            Patient Acceptance, E, VU by MM at 1/29/2025 1256    Comment: role of OT, d/c rec, plan of care                      Point: Home exercise program (Not Started)       Description:   Instruct learner(s) on appropriate technique for monitoring, assisting and/or progressing therapeutic exercises/activities.                  Learner Progress:  Not documented in this visit.              Point: Precautions (Done)       Description:   Instruct learner(s) on prescribed precautions during self-care and functional transfers.                  Learning Progress Summary            Patient Acceptance, E, VU by MM at 1/29/2025 1256    Comment: role of OT, d/c rec, plan of care                      Point: Body mechanics (Done)       Description:   Instruct learner(s) on proper positioning and spine alignment during self-care, functional mobility activities and/or exercises.                  Learning Progress Summary            Patient Acceptance, E, VU by MM at 1/29/2025 1256    Comment: role of OT, d/c rec, plan of care                                      User Key       Initials Effective Dates Name Provider Type Discipline    MARY ANN 05/31/24 -  Gris Cuello OT Occupational Therapist OT                  OT Recommendation and Plan     Plan of Care Review  Plan of Care Reviewed With: patient  Progress: improving  Outcome Evaluation: Pt seen for OT session this pm, pleasant and eager to participate. Pt UIC and agreeable to BUE AROM in all major planes 3x10 reps while sitting with (I) to work on increasing strength and fxnl reach during ADLs and xfers. Rest breaks given as pt needed. Pt tolerated exercises well. Plan for dc to home with assist and HH. OT will continue to monitor.     Time Calculation:          Time Calculation- OT       Row Name 01/30/25 1538             Time Calculation- OT    OT Start Time 1520  -PP      OT Stop Time 1543  -PP      OT Time Calculation (min) 23 min  -PP      Total Timed Code Minutes- OT 23 minute(s)  -PP      OT Received On 01/30/25  -PP      OT - Next Appointment 01/31/25  -PP         Timed Charges    20615 - OT Therapeutic Exercise Minutes 23  -PP         Total Minutes    Timed Charges Total Minutes 23  -PP       Total Minutes 23  -PP                User Key  (r) = Recorded By, (t) = Taken By, (c) = Cosigned By      Initials Name Provider Type    PP Arleen Nava, OT Occupational Therapist                  Therapy Charges for Today       Code Description Service Date Service Provider Modifiers Qty    09244749601 HC OT THER PROC EA 15 MIN 1/30/2025 Arleen Nava, OT GO 2                 Arleen Nava, OT  1/30/2025

## 2025-01-30 NOTE — THERAPY TREATMENT NOTE
Acute Care - Speech Language Pathology   Swallow Treatment Note [facility]     Patient Name: Madhu Santoro  : 1945  MRN: [id number]  Today's Date: 2025               Admit Date: 2025    Visit Dx:     ICD-10-CM ICD-9-CM   1. General weakness  R53.1 780.79   2. Difficulty walking  R26.2 719.7   3. Parkinson's disease, unspecified whether dyskinesia present, unspecified whether manifestations fluctuate  G20.A1 332.0   4. Viral URI with cough  J06.9 465.9   5. Dysuria  R30.0 788.1   6. Hyperglycemia  R73.9 790.29   7. Elevated troponin  R79.89 790.6   8. Polycythemia vera  D45 238.4     Patient Active Problem List   Diagnosis    Anxiety    Arthritis    Coronary artery disease due to lipid rich plaque    HLD (hyperlipidemia)    Benign essential hypertension    DDD (degenerative disc disease), lumbosacral    Cerebrovascular accident    Encounter for screening for cardiovascular disorders    Gastroesophageal reflux disease    Hyperlipidemia    Stenosis of carotid artery    Former smoker    History of cardiac catheterization    S/P ablation of atrial flutter    Typical atrial flutter    S/P CABG (coronary artery bypass graft)    Adenomatous polyp of ascending colon    Abdominal bloating    Stroke    PAD (peripheral artery disease)    Acute cholecystitis    Right upper quadrant abdominal pain    Chronic pain of both hips    Chronic bilateral low back pain without sciatica    Sacroiliac joint dysfunction of both sides    Encounter for long-term (current) use of high-risk medication    Lumbar facet arthropathy    Stroke-like symptoms    CVA (cerebral vascular accident)    Personal history of colonic polyps    Arthralgia of multiple joints    Iron deficiency    Thrombocytosis    Left ureteral stone    Obstructive uropathy    Chronic anticoagulation    Facial skin lesion    Iron deficiency anemia    Polycythemia    Neuropathy    Weakness    Pneumonia    Close exposure to COVID-19 virus    Polycythemia  "vera    Gout    COVID-19    Cytokine release syndrome, grade 2    Anemia    History of CVA (cerebrovascular accident)    S/P percutaneous endoscopic gastrostomy (PEG) tube placement    Mandel esophagus    Sacral decubitus ulcer    Oral phase dysphagia    Acute blood loss anemia    Orthostatic dizziness    Orthostatic hypotension    Bilateral leg weakness    General weakness    Generalized weakness     Past Medical History:   Diagnosis Date    Anxiety     Arthritis     Atrial flutter     Status post cavotricuspid isthmus ablation by Dr. Gustafson on 4/18/17    Mandel esophagus     Benign essential hypertension     CAD (coronary artery disease)     3 vessel CABG 4/11/17 by Dr. Cortez: ROSARIO-prox LAD, SVG-OM1, SVG-OM3    Carotid artery disease     Status post carotid endarterectomy - USG 4/10/17: 50-59% NICHELLE, 1-15% LICA.     Colonic polyp     COVID-19 long hauler     Cyst of pancreas     DDD (degenerative disc disease), lumbosacral     GERD (gastroesophageal reflux disease)     H/O bone density study 2013    H/O complete eye exam 2014    History of kidney stone     HLD (hyperlipidemia)     Hypertension     Kidney stone     8/22/22    Lipid screening 05/31/2013    Low back pain     physical therapy [facility] rehab 5-12-10    Parkinson disease     Screening for prostate cancer 07/07/2015    Skin cancer     nose    Stroke     RESIDUAL--\"BALANCE ISSUES\"     Past Surgical History:   Procedure Laterality Date    CARDIAC CATHETERIZATION N/A 04/10/2017    Procedure: Left Heart Cath;  Surgeon: Marjorie Healy MD;  Location:  [facility] CATH [facility]VASIVE [facility];  Service:     CARDIAC CATHETERIZATION N/A 04/10/2017    Procedure: Coronary angiography;  Surgeon: Marjorie Healy MD;  Location:  [facility];  Service:     CARDIAC CATHETERIZATION N/A 04/10/2017    Procedure: Left ventriculography;  Surgeon: Marjorie Healy MD;  Location:  [facility];  Service:     CARDIAC CATHETERIZATION  2011    CARDIAC " ELECTROPHYSIOLOGY PROCEDURE N/A 04/18/2017    Procedure: Ablation atrial flutter;  Surgeon: Jose Antonio Gustafson MD;  Location: [facility] CATH INVASIVE LOCATION;  Service:     CAROTID ENDARTERECTOMY      CHOLECYSTECTOMY      CHOLECYSTECTOMY WITH INTRAOPERATIVE CHOLANGIOGRAM N/A 09/07/2019    Procedure: Laparoscopic cholecystectomy with intraoperative cholangiogram;  Surgeon: Gauri Travis MD;  Location: [facility] MAIN OR;  Service: General    COLONOSCOPY  01/06/2015    Diverticulosis, one TA    COLONOSCOPY N/A 02/14/2019    tics, NBIH, adenomatous polyp x 2    COLONOSCOPY N/A 11/05/2021    Procedure: COLONOSCOPY TO CECUM AND TERM. ILEUM WITH COLD POLYPECTOMIES;  Surgeon: Everton Abel MD;  Location: [facility] ENDOSCOPY;  Service: Gastroenterology;  Laterality: N/A;  PRE OP - PERS H/O POLYPS  POST OP - COLON POLYPS,, DIVERTICULOSIS, HEMORRHOIDS    CORONARY ARTERY BYPASS GRAFT N/A 04/11/2017    Procedure: AR STERNOTOMY CORONARY ARTERY BYPASS GRAFT TIMES 3 USING LEFT INTERNAL MAMMARY ARTERY AND LEFT GREATER SAPHENOUS VEIN GRAFT PER ENDOSCOPIC VEIN HARVESTING AND PRP ;  Surgeon: Temo Cortez MD;  Location: [facility] MAIN OR;  Service:     ENDOSCOPY  01/06/2015    HH, Ervin's esophagus    ENDOSCOPY N/A 02/14/2019    Z line irregular, HH, Ervin's esophagus    ENDOSCOPY N/A 11/05/2021    Procedure: ESOPHAGOGASTRODUODENOSCOPY WITH BIOPSIES;  Surgeon: Everton Abel MD;  Location: [facility] ENDOSCOPY;  Service: Gastroenterology;  Laterality: N/A;  PRE OP - PERS H/O ERVIN'S  POST OP - IRREG Z LINE    ENDOSCOPY W/ PEG TUBE PLACEMENT N/A 11/6/2023    Procedure: ESOPHAGOGASTRODUODENOSCOPY WITH PERCUTANEOUS ENDOSCOPIC GASTROSTOMY TUBE INSERTION;  Surgeon: Temo Ramsey MD;  Location: [facility] ENDOSCOPY;  Service: General;  Laterality: N/A;  Pre: dysphagia  Post: same    KNEE SURGERY Left     URETEROSCOPY LASER LITHOTRIPSY WITH STENT INSERTION Left 8/23/2022    Procedure: Cysto retrograde with left uretro stent  placement;  Surgeon: Damien Oliveira MD;  Location: [facility] MAIN OR;  Service: Urology;  Laterality: Left;    VASECTOMY         SLP Recommendation and Plan                                                                               Outcome Evaluation: Dysphagia treatment completed. Introduced patient to EMST75. Determined MAX strength at 17eyC2K. Pt completed 5 sets of 5 reps with MIN fading to NO cues. Provided tracking sheet and written education detailing EMST75 protocol/instructions. Pt agrees to complete EMST during and following hospitalization.      SWALLOW EVALUATION (Last 72 Hours)       SLP Adult Swallow Evaluation       Row Name 01/30/25 1200 01/29/25 1200                Rehab Evaluation    Document Type therapy note (daily note)  -CR evaluation  -KA       Subjective Information no complaints  -CR no complaints  -KA       Patient Observations alert;cooperative  -CR alert;cooperative  -KA       Patient Effort good  -CR good  -KA       Symptoms Noted During/After Treatment none  -CR none  -KA          General Information    Patient Profile Reviewed yes  -CR yes  -KA       Pertinent History Of Current Problem -- Patient admitted with weakness, difficulty walking. Hx of Parkinsons disease, CVA and dysphagia with PEG. Multiple VFSSs in the past, most recent on 10/21/2024 recommended mechanical soft no mixed and NTL. Pt does not recall drinking NTL at home. Chest XRAY shows no focal pulmonary consolidation.  -KA       Current Method of Nutrition -- regular textures;thin liquids  -KA       Precautions/Limitations, Vision -- WFL;for purposes of eval  -KA       Precautions/Limitations, Hearing -- WFL;for purposes of eval  -KA       Prior Level of Function-Swallowing -- other (see comments)  see history  -KA       Plans/Goals Discussed with -- patient  -KA       Barriers to Rehab -- medically complex  question baseline cognition and recall of previous VFSSs results  -KA       Patient's Goals for Discharge --  patient did not state  -          Pain    Pretreatment Pain Rating -- 0/10 - no pain  -KA       Posttreatment Pain Rating -- 0/10 - no pain  -KA          Oral Motor Structure and Function    Dentition Assessment -- natural, present and adequate  -       Secretion Management -- WNL/WFL  -       Mucosal Quality -- moist, healthy  -          Oral Musculature and Cranial Nerve Assessment    Oral Motor General Assessment -- generalized oral motor weakness  -          General Eating/Swallowing Observations    Eating/Swallowing Skills -- self-fed  -       Positioning During Eating -- upright in chair  -       Utensils Used -- spoon;cup;straw  -KA       Consistencies Trialed -- soft to chew textures;chopped;mixed consistency;pureed;thin liquids;nectar/syrup-thick liquids  -          Clinical Swallow Eval    Clinical Swallow Evaluation Summary -- With thins via straw and cup pt demonstrated audible swallows and immediate throat clearing via straw and delayed throat clearing via cup. No overt s/s of pen/asp with NTL, puree and mechanical soft chopped consistency. Pt stated he felt like the peaches were stuck in his throat and with cued multiple swallows and NTL wash pt reported that assisted in clearing. SLP reviewed results of VFSS with patient and importance of swallow precautions: alternate liquids and solids, multiple swallows and or hard swallows, small bites and sips.  -          SLP Evaluation Clinical Impression    SLP Swallowing Diagnosis -- oral dysphagia;pharyngeal dysphagia;moderate  -       Functional Impact -- risk of aspiration/pneumonia  -       Rehab Potential/Prognosis, Swallowing -- good, to achieve stated therapy goals  -       Swallow Criteria for Skilled Therapeutic Interventions Met -- demonstrates skilled criteria  -          Recommendations    Therapy Frequency (Swallow) -- PRN  -       Predicted Duration Therapy Intervention (Days) -- until discharge  -       SLP Diet  Recommendation -- soft to chew textures;ground;no mixed consistencies;nectar thick liquids;water between meals after oral care, with supervision;ice chips between meals after oral care, with supervision  -       Recommended Precautions and Strategies -- upright posture during/after eating;small bites of food and sips of liquid;multiple swallows per bite of food;multiple swallows per sip of liquid;alternate between small bites of food and sips of liquid;general aspiration precautions  -       Oral Care Recommendations -- Oral Care BID/PRN  -       SLP Rec. for Method of Medication Administration -- meds crushed;with puree;meds via alternate route  -       Monitor for Signs of Aspiration -- yes;notify SLP if any concerns  -       Anticipated Discharge Disposition (SLP) -- anticipate therapy at next level of care  -          Swallow Goals (SLP)    Swallow STGs pharyngeal strengthening exercise goal selection (SLP)  -CR diet tolerance goal selection (SLP)  -KA       Diet Tolerance Goal Selection (SLP) -- Patient will tolerate trials of  -KA       Pharyngeal Strengthening Exercise Goal Selection (SLP) pharyngeal strengthening exercise, SLP goal 1  -CR --          (STG) Patient will tolerate trials of    Consistencies Trialed (Tolerate trials) -- soft to chew (ground) textures;nectar/ mildly thick liquids  -       Desired Outcome (Tolerate trials) -- without signs/symptoms of aspiration  -       Zenda (Tolerate trials) -- independently (over 90% accuracy)  -KA          ([facility]) Pharyngeal Strengthening Exercise Goal 1 (SLP)    Activity (Pharyngeal Strengthening Goal 1, SLP) increase timing  -CR --       Increase Timing EMST  -CR --       Zenda/Accuracy (Pharyngeal Strengthening Goal 1, SLP) with minimal cues (75-90% accuracy)  -CR --       Time Frame (Pharyngeal Strengthening Goal 1, SLP) by discharge  -CR --       Progress/Outcomes (Pharyngeal Strengthening Goal 1, SLP) new goal;good progress  toward goal  -CR --       Comment (Pharyngeal Strengthening Goal 1, SLP) Dysphagia treatment completed. Introduced patient to EMST75. Determined MAX strength at 76csW2Q. Pt completed 5 sets of 5 reps at [id number] with MIN fading to NO cues. Provided tracking sheet and written education detailing EMST75 protocol/instructions. Pt agrees to complete EMST during and following hospitalization. Wet voice and productive cough x2 observed during treatment. SLP provided education regarding risk of aspiration and safest diet being soft/ground with nectar thick liquids. Pt reports he will likely return to unrestricted diet upon discharge. SLP encouraged pt to utilize safe swallow strategies and continue completing EMST HEP. Recommend continue mechanical ground, no mixed consistencies, and NTL diet. Meds crushed with puree. Small bites and sips, alternate liquids and solids and double swallows. Okay for free water protocol.  -CR --                 User Key  (r) = Recorded By, (t) = Taken By, (c) = Cosigned By      Initials Name Effective Dates    KA Valentino Peralta, GONZÁLEZ 01/05/24 -     Adry Saldaña SLP 12/03/24 -                     EDUCATION  The patient has been educated in the following areas:   Dysphagia (Swallowing Impairment).        SLP GOALS       Row Name 01/30/25 1200 01/29/25 1200          (STG) Patient will tolerate trials of    Consistencies Trialed (Tolerate trials) -- soft to chew (ground) textures;nectar/ mildly thick liquids  -KA     Desired Outcome (Tolerate trials) -- without signs/symptoms of aspiration  -KA     Vivian (Tolerate trials) -- independently (over 90% accuracy)  -KA        ([facility]) Pharyngeal Strengthening Exercise Goal 1 (SLP)    Activity (Pharyngeal Strengthening Goal 1, SLP) increase timing  -CR --     Increase Timing EMST  -CR --     Vivian/Accuracy (Pharyngeal Strengthening Goal 1, SLP) with minimal cues (75-90% accuracy)  -CR --     Time Frame (Pharyngeal Strengthening Goal 1,  SLP) by discharge  -CR --     Progress/Outcomes (Pharyngeal Strengthening Goal 1, SLP) new goal;good progress toward goal  -CR --     Comment (Pharyngeal Strengthening Goal 1, SLP) Dysphagia treatment completed. Introduced patient to EMST75. Determined MAX strength at 23wgL6W. Pt completed 5 sets of 5 reps at 93waA6P with MIN fading to NO cues. Provided tracking sheet and written education detailing EMST75 protocol/instructions. Pt agrees to complete EMST during and following hospitalization. Wet voice and productive cough x2 observed during treatment. SLP provided education regarding risk of aspiration and safest diet being soft/ground with nectar thick liquids. Pt reports he will likely return to unrestricted diet upon discharge. SLP encouraged pt to utilize safe swallow strategies and continue completing EMST HEP. Recommend continue mechanical ground, no mixed consistencies, and NTL diet. Meds crushed with puree. Small bites and sips, alternate liquids and solids and double swallows. Okay for free water protocol.  -CR --               User Key  (r) = Recorded By, (t) = Taken By, (c) = Cosigned By      Initials Name Provider Type    Valentino Geller, SLP Speech and Language Pathologist    Adry Saldaña SLP Speech and Language Pathologist                         Time Calculation:    Time Calculation- SLP       Row Name 01/30/25 1226             Time Calculation- SLP    SLP Start Time 0730  -CR      SLP Received On 01/30/25  -CR         Untimed Charges    08336-LQ Treatment Swallow Minutes 60  -CR         Total Minutes    Untimed Charges Total Minutes 60  -CR       Total Minutes 60  -CR                User Key  (r) = Recorded By, (t) = Taken By, (c) = Cosigned By      Initials Name Provider Type    Adry Saldaña, SLP Speech and Language Pathologist                    Therapy Charges for Today       Code Description Service Date Service Provider Modifiers Qty    [id number]  ST TREATMENT SWALLOW 4  1/30/2025 Adry Petersen, GONZÁLEZ GN 1                 GONZÁLEZ Munson  1/30/2025

## 2025-01-30 NOTE — PROGRESS NOTES
DOS: 2025  NAME: Madhu Santoro   : 1945  PCP: Damian Sanchez MD    Chief Complaint   Patient presents with    Weakness - Generalized    Fatigue        Subjective: No acute events overnight.  Patient slow movements and weakness.  He feels he is weaker here than he was at home.  Still having does not feel like he is much improved since yesterday.  No family at bedside.    Objective:  Vital signs:   Vitals:    25 1951 25 0345 25 0743 25 1210   BP: 130/43 137/56 148/61 136/65   BP Location: Left arm Left arm Left arm Left arm   Patient Position: Lying Lying Lying Lying   Pulse: 65 57 62 72   Resp: 18 16 18 18   Temp: 97.7 °F (36.5 °C) 97.7 °F (36.5 °C) 97.9 °F (36.6 °C) 97.5 °F (36.4 °C)   TempSrc: Oral Oral Oral    SpO2: 95% 94% 94% 95%   Weight:       Height:           Current Facility-Administered Medications:     acetaminophen (TYLENOL) tablet 650 mg, 650 mg, Per PEG Tube, Q4H PRN **OR** acetaminophen (TYLENOL) 160 MG/5ML oral solution 650 mg, 650 mg, Per PEG Tube, Q4H PRN **OR** acetaminophen (TYLENOL) suppository 650 mg, 650 mg, Rectal, Q4H PRN, Angy Bond MD    allopurinol (ZYLOPRIM) tablet 300 mg, 300 mg, Oral, Daily, Angy Bond MD, 300 mg at 25 0933    apixaban (ELIQUIS) tablet 2.5 mg, 2.5 mg, Oral, Q12H, Angy Bond MD, 2.5 mg at 25 0933    sennosides-docusate (PERICOLACE) 8.6-50 MG per tablet 2 tablet, 2 tablet, Per PEG Tube, BID PRN **AND** polyethylene glycol (MIRALAX) packet 17 g, 17 g, Per PEG Tube, Daily PRN **AND** bisacodyl (DULCOLAX) EC tablet 5 mg, 5 mg, Oral, Daily PRN **AND** bisacodyl (DULCOLAX) suppository 10 mg, 10 mg, Rectal, Daily PRN, Angy Bond MD    carbidopa-levodopa (SINEMET)  MG per tablet 1 tablet, 1 tablet, Oral, 4x Daily, Janett Robertson APRN, 1 tablet at 25 1315    entacapone (COMTAN) tablet 200 mg, 200 mg, Oral, 4x Daily, Janett Robertson, MY    HYDROcodone-acetaminophen  (NORCO) 5-325 MG per tablet 1 tablet, 1 tablet, Oral, TID PRN, Angy Bond MD, 1 tablet at 01/30/25 0934    hydroxyurea (HYDREA) capsule 500 mg, 500 mg, Oral, Daily, Angy Bond MD, 500 mg at 01/30/25 0934    lisinopril (PRINIVIL,ZESTRIL) tablet 5 mg, 5 mg, Oral, Daily, Angy Bond MD, 5 mg at 01/30/25 0933    ondansetron (ZOFRAN) injection 4 mg, 4 mg, Intravenous, Q6H PRN, Angy Bond MD    pantoprazole (PROTONIX) EC tablet 40 mg, 40 mg, Oral, Q AM, Angy Bond MD, 40 mg at 01/30/25 0448    rosuvastatin (CRESTOR) tablet 20 mg, 20 mg, Oral, Nightly, Angy Bond MD, 20 mg at 01/29/25 2200    PRN meds    acetaminophen **OR** acetaminophen **OR** acetaminophen    senna-docusate sodium **AND** polyethylene glycol **AND** bisacodyl **AND** bisacodyl    HYDROcodone-acetaminophen    ondansetron    No current facility-administered medications on file prior to encounter.     Current Outpatient Medications on File Prior to Encounter   Medication Sig    allopurinol (ZYLOPRIM) 300 MG tablet TAKE 1 TABLET BY MOUTH DAILY    apixaban (ELIQUIS) 2.5 MG tablet tablet Take 1 tablet by mouth Every 12 (Twelve) Hours. Indications: Atrial Fibrillation    carbidopa-levodopa (Sinemet)  MG per tablet Take 1 tablet by mouth 3 (Three) Times a Day.    HYDROcodone-acetaminophen (NORCO) 5-325 MG per tablet Take 1 tablet by mouth 3 (Three) Times a Day As Needed for Severe Pain or Moderate Pain. 30 day supply.    hydroxyurea (Hydrea) 500 MG capsule Take 1 capsule by mouth Daily.    lisinopril (PRINIVIL,ZESTRIL) 5 MG tablet Take 1 tablet by mouth Daily.    rosuvastatin (Crestor) 20 MG tablet Take 1 tablet by mouth Every Night. Indications: Cerebrovascular Accident or Stroke    cephalexin (KEFLEX) 500 MG capsule Take 1 capsule by mouth Every 12 (Twelve) Hours.    gabapentin (NEURONTIN) 100 MG capsule TAKE ONE CAPSULE BY MOUTH TWICE DAILY    Nutritional Supplements (OSMOLITE 1.5  "GIANNI PO) 240 Units/oz/day by Gastrostomy Tube route 6 (Six) Times a Day.    RABEprazole (ACIPHEX) 20 MG EC tablet TAKE 1 TABLET BY MOUTH DAILY       General appearance: Elderly male, masked face, NAD, alert and cooperative  HEENT: Normocephalic, atraumatic     Neurological:   MS: oriented x3, normal attention/concentration, moderate hypophonia, no neglect, normal fund of knowledge  CN: visual fields full, EOMI, facial sensation equal, no facial droop, shoulder shrug equal, tongue midline  Motor: 5/5 upper extremities, 4/5 lower extremities more distally-unable to hold up against resistance but able to hold against gravity >5 seconds, bradykinetic movements throughout, mild rigidity of RUE    Physical exam performed, changes noted.    Laboratory results:  Lab Results   Component Value Date    TSH 3.710 01/28/2025     Lab Results   Component Value Date    [id number] 882 01/29/2025     Lab Results   Component Value Date    HGBA1C 5.50 11/04/2023     Lab Results   Component Value Date    GLUCOSE 98 01/30/2025    BUN 17 01/30/2025    CREATININE 1.00 01/30/2025    EGFRIFNONA 65 02/08/2022    EGFRIFAFRI 75 02/08/2022    BCR 17.0 01/30/2025    K 3.8 01/30/2025    CO2 24.6 01/30/2025    CALCIUM 8.9 01/30/2025    PROTENTOTREF 7.0 05/14/2024    ALBUMIN 4.1 01/28/2025    LABIL2 1.6 05/14/2024    AST 26 01/28/2025    ALT 8 01/28/2025     Lab Results   Component Value Date    WBC 19.06 (H) 01/30/2025    HGB 12.8 (L) 01/30/2025    HCT 46.2 01/30/2025    MCV 71.9 (L) 01/30/2025     (H) 01/30/2025     Brief Urine Lab Results  (Last result in the past 365 days)        Color   Clarity   Blood   Leuk Est   Nitrite   Protein   CREAT   Urine HCG        01/28/25 1623 Yellow   Cloudy   Negative   Negative   Negative   100 mg/dL (2+)                 No results found for: \"ACANTHNAEG\", \"AFBCX\", \"BPERTUSSISCX\", \"BLOODCX\"  No results found for: \"BCIDPCR\", \"CXREFLEX\", \"CSFCX\", \"CULTURETIS\"  No results found for: \"CULTURES\", \"HSVCX\", " "\"URCX\"  No results found for: \"EYECULTURE\", \"GCCX\", \"HSVCULTURE\", \"LABHSV\"  No results found for: \"LEGIONELLA\", \"MRSACX\", \"MUMPSCX\", \"MYCOPLASCX\"  No results found for: \"NOCARDIACX\", \"STOOLCX\"  No results found for: \"THROATCX\", \"UNSTIMCULT\", \"URINECX\", \"CULTURE\", \"VZVCULTUR\"  No results found for: \"VIRALCULTU\", \"WOUNDCX\"  Pain Management Panel  More data exists         Latest Ref Rng & Units 10/31/2024 4/22/2024   Pain Management Panel   Amphetamine, Urine Qual Negative Negative  Negative    Barbiturates Screen, Urine Negative Negative  Negative    Benzodiazepine Screen, Urine Negative Negative  Negative    Cocaine Screen, Urine Negative Negative  Negative    Methadone Screen , Urine Negative Negative  Negative       Details                   Review and interpretation of imaging:  XR Chest 1 View    Result Date: 1/28/2025  No focal pulmonary consolidation. Follow-up as clinical indications persist.  This report was finalized on 1/28/2025 1:45 PM by Dr. Mukesh Bruce M.D on Workstation: [id number]     Results for orders placed during the hospital encounter of 03/05/24    Adult Transthoracic Echo Complete W/ Cont if Necessary Per Protocol    Interpretation Summary    Left ventricular ejection fraction appears to be 56 - 60%.    Left ventricular diastolic function was normal.    There is calcification of the aortic valve.    Mild tricuspid valve regurgitation is present.    Estimated right ventricular systolic pressure from tricuspid regurgitation is normal (<35 mmHg). Calculated right ventricular systolic pressure from tricuspid regurgitation is 24 mmHg.    There is a large left pleural effusion.      Impression/Assessment:  This is a 79 y.o. male with past medical history of peripheral neuropathy, atrial flutter on Eliquis, recent diagnosis of Parkinson's disease in June 2024 followed by Dr. Mukesh Russell in our outpatient office who presented to the hospital on 1/28/2025 with complaints of progressive generalized " weakness. 4 to 5 days ago he noticed that he was starting to become progressively weak.  Typically at baseline he uses a rolling walker or cane at home.  Over the last couple of days he has been having difficulty getting up out of his bed and walking.  He states once he comes to stand it takes him a while to initiate his walking.  He has been holding onto furniture to assist him.  In the outpatient he was taking carbidopa/levodopa  mg 1 tab 3 times daily.  When he was initially diagnosed in June he was placed on  mg 1 tab 3 times daily.  This was increased to  mg 1 tab 3 times daily in December.  He states he has not noticed any improvement of symptoms since the change.  He has not necessarily noticed any worsening dysphagia.  He does have a g-tube that is clamped. He feels he is moving slower.  Denies any dizziness or lightheadedness.  He has been having some chills and mild coughing recently.  He also reports feeling a burning sensation when he would urinate and has noticed more urinary incontinence.  His U/A was negative. Denies any new neck or back pain.  Feels his neck has had some discomfort due to the positioning of his head on his pillow.  He did have full neural axis imaging in January 2024 that was essentially negative, bulging disc at L3-4 and 4-5 mildly narrowing the canal.     Diagnosis:  Idiopathic Parkinson's disease  Peripheral neuropathy  Leukocytosis  Thrombocytosis  Dysuria and incontinence    Plan:  - Increase in 4 times daily dosing did not improve his symptoms much at all.  - Will add Entacapone 200mg to be given with each Carvidopa/Levidopa dose  - He is now getting feeding boluses. It would be ideal to get his meds 2 hours after his feedings however that is likely not going to be possible as he is also taking food by mouth. Would like for pharmacy to assist to try and get meds as close as possible to 2 hours after feeding schedule.   - Would like PT/OT to continue to work  with him. Have also asked nursing to mobilize him more. Goal would be to get him up 3 times a day.  - His U/A and B12 level was normal.   Will follow up on him tomorrow.     Case discussed with patient and Dr. Gomez, and she agrees with plan above.     MY Triana

## 2025-01-30 NOTE — CASE MANAGEMENT/SOCIAL WORK
Discharge Planning Assessment  [facility]     Patient Name: Madhu Santoro  MRN: [id number]  Today's Date: 1/30/2025    Admit Date: 1/28/2025    Plan: SNF referrals pending   Discharge Needs Assessment       Row Name 01/30/25 1315       Living Environment    People in Home spouse    Current Living Arrangements home    Primary Care Provided by self    Provides Primary Care For no one    Family Caregiver if Needed spouse    Able to Return to Prior Arrangements yes       Resource/Environmental Concerns    Resource/Environmental Concerns home accessibility    Home Accessibility Concerns stairs to enter home       Transition Planning    Patient/Family Anticipates Transition to inpatient rehabilitation facility    Patient/Family Anticipated Services at Transition skilled nursing;rehabilitation services    Transportation Anticipated health plan transportation;family or friend will provide       Discharge Needs Assessment    Equipment Currently Used at Home walker, rolling;wheelchair;hospital bed;cane, straight;commode    Concerns to be Addressed discharge planning    Discharge Facility/Level of Care Needs nursing facility, skilled                   Discharge Plan       Row Name 01/30/25 1316       Plan    Plan SNF referrals pending    Patient/Family in Agreement with Plan yes    Plan Comments CCP spoke with patient and spouse at bedside; explained role, verified facesheet, and discussed dc plan. Patient uses a cane, wheelchair, walker, hospital bed, and raised commode. He lives with his spouse in a 2 level home but resides on 1 level. There is 1 step to enter. Patient has been to [facility] in the past. Spouse is requesting referrals to [facility] and [facility]. CCP left [facility] for [facility] to see if they can look at the referral. ANTOINE DANIELLE                  Continued Care and Services - Admitted Since 1/28/2025       Destination       Service Provider Request Status Services Address Phone Fax Patient  Preferred    [facility] Pending - Request Sent -- [address] [id number] [phone] --    [facility] Pending - Request Sent -- [address] [id number] [phone] --                  Expected Discharge Date and Time       Expected Discharge Date Expected Discharge Time    Jan 31, 2025            Demographic Summary       Row Name 01/30/25 1315       General Information    Admission Type inpatient    Arrived From home    Referral Source admission list    Reason for Consult discharge planning    Preferred Language English       Contact Information    Permission Granted to Share Info With family/designee                   Functional Status       Row Name 01/30/25 1315       Functional Status    Usual Activity Tolerance moderate    Current Activity Tolerance fair       Functional Status, IADL    Medications assistive equipment and person    Meal Preparation assistive equipment and person    Housekeeping assistive equipment and person    Laundry assistive equipment and person    Shopping assistive equipment and person       Mental Status    General Appearance WDL WDL                   Psychosocial    No documentation.                  Abuse/Neglect    No documentation.                  Legal    No documentation.                  Substance Abuse    No documentation.                  Patient Forms    No documentation.                     ANTOINE Sandra

## 2025-01-30 NOTE — PROGRESS NOTES
Name: Madhu Santoro ADMIT: 2025   : 1945  PCP: Damian Sanchez MD    MRN: [id number] LOS: 1 days   AGE/SEX: 79 y.o. male  ROOM: [facility]     Subjective   Subjective   Resting in his bed, disoriented this morning. Tells me he's been in the hospital 4 days. Asks about going home.        Objective   Objective   Vital Signs  Temp:  [97 °F (36.1 °C)-97.9 °F (36.6 °C)] 97.5 °F (36.4 °C)  Heart Rate:  [57-72] 72  Resp:  [16-20] 18  BP: (130-148)/(43-65) 136/65  SpO2:  [93 %-95 %] 95 %  on   ;   Device (Oxygen Therapy): room air  Body mass index is 21.12 kg/m².  Physical Exam  Constitutional:       General: He is not in acute distress.     Appearance: He is ill-appearing. He is not toxic-appearing.   Cardiovascular:      Rate and Rhythm: Normal rate and regular rhythm.   Pulmonary:      Effort: Pulmonary effort is normal. No respiratory distress.      Breath sounds: Normal breath sounds. No wheezing.   Abdominal:      General: Abdomen is flat.      Palpations: Abdomen is soft.      Tenderness: There is no abdominal tenderness.   Musculoskeletal:         General: No tenderness.      Right lower leg: No edema.      Left lower leg: No edema.   Skin:     General: Skin is warm and dry.   Neurological:      Mental Status: He is alert and oriented to person, place, and time. Mental status is at baseline.      Motor: Weakness present.      Comments: 5/5 UE, 4/5 LE; rigidity of RUE; bradykinesis         Results Review     I reviewed the patient's new clinical results.  Results from last 7 days   Lab Units 25  0524 25  0648 25  1327   WBC 10*3/mm3 19.06* 26.40* 25.74*   HEMOGLOBIN g/dL 12.8* 13.8 14.8   PLATELETS 10*3/mm3 588* 625* 721*     Results from last 7 days   Lab Units 25  0524 25  0648 25  1327   SODIUM mmol/L 141 138 138   POTASSIUM mmol/L 3.8 4.7 4.0   CHLORIDE mmol/L 108* 106 101   CO2 mmol/L 24.6 23.0 30.7*   BUN mg/dL 17 16 18   CREATININE mg/dL 1.00 1.05 1.19  "  GLUCOSE mg/dL 98 95 136*   Estimated Creatinine Clearance: 52.5 mL/min (by C-G formula based on SCr of 1 mg/dL).  Results from last 7 days   Lab Units 01/28/25  1327   ALBUMIN g/dL 4.1   BILIRUBIN mg/dL 0.9   ALK PHOS U/L 150*   AST (SGOT) U/L 26   ALT (SGPT) U/L 8     Results from last 7 days   Lab Units 01/30/25  0524 01/29/25  0648 01/28/25  1327   CALCIUM mg/dL 8.9 9.3 9.9   ALBUMIN g/dL  --   --  4.1   MAGNESIUM mg/dL  --   --  2.3       COVID19   Date Value Ref Range Status   01/28/2025 Not Detected Not Detected - Ref. Range Final   01/07/2024 Not Detected Not Detected - Ref. Range Final     No results found for: \"HGBA1C\", \"POCGLU\"    XR Chest 1 View  Narrative: XR CHEST 1 VW-     HISTORY: Male who is 79 years-old, weakness     TECHNIQUE: Frontal view of the chest     COMPARISON: 3/9/2024     FINDINGS: The heart size is normal. Aorta is calcified. Pulmonary  vasculature is unremarkable. Old granulomatous disease is apparent.  Sternotomy wires are noted. No focal pulmonary consolidation, pleural  effusion, or pneumothorax. No acute osseous process.     Impression: No focal pulmonary consolidation. Follow-up as clinical  indications persist.     This report was finalized on 1/28/2025 1:45 PM by Dr. Mukesh Bruce M.D on Workstation: [id number]       Scheduled Medications  allopurinol, 300 mg, Oral, Daily  apixaban, 2.5 mg, Oral, Q12H  carbidopa-levodopa, 1 tablet, Oral, 4x Daily  hydroxyurea, 500 mg, Oral, Daily  lisinopril, 5 mg, Oral, Daily  pantoprazole, 40 mg, Oral, Q AM  rosuvastatin, 20 mg, Oral, Nightly    Infusions     Diet  Diet: Regular/House; No Mixed Consistencies; Texture: Soft to Chew (NDD 3); Soft to Chew: Ground Meat; Fluid Consistency: Nectar Thick         I have personally reviewed:  [x]  Laboratory   []  Microbiology   []  Radiology   [x]  EKG/Telemetry   []  Cardiology/Vascular   []  Pathology   []  Records    Assessment/Plan     Active Hospital Problems    Diagnosis  POA    **General " weakness [R53.1]  Yes    Generalized weakness [R53.1]  Yes    History of CVA (cerebrovascular accident) [Z86.73]  Not Applicable    Gout [M10.9]  Yes    Polycythemia vera [D45]  Yes    Polycythemia [D75.1]  Yes    PAD (peripheral artery disease) [I73.9]  Yes    S/P CABG (coronary artery bypass graft) [Z95.1]  Not Applicable    Benign essential hypertension [I10]  Yes    DDD (degenerative disc disease), lumbosacral [M51.379]  Yes    Anxiety [F41.9]  Yes    HLD (hyperlipidemia) [E78.5]  Yes      Resolved Hospital Problems   No resolved problems to display.       79 y.o. male admitted with General weakness.    Weakness, progressive  Parkinson's disease  Peripheral neuropathy  - d/w neurology- patient likely being undertreated for Parkinson's- increased dose recommended- patient's sinemet has been increased to QID on 1/29- monitor for clinical response  - TSH wnl; B12 ok, per neuro- considering addition of  Comtan pending clinical course     CAD s/p CABG  Atrial flutter s/p ablation, on Eliquis   HTN  HLD  - cont home Eliquis, lisinopril    Hx of CVA  Carotid vascular disease  - cont home statin/eliquis    GERD  Dysphagia  - PPI  - complained of difficulty swallowing to RN- SLP to evaluate  - modified diet in place; speech therapy gave patient exercises to perform    Polycythemia vera/JAK2 mutation/essential thrombocythemia   - f/w CBC group; counts elevated but stable  - on hydrea    Lumbar degenerative disc disease    Gout  - allopurinol    SCDs for DVT prophylaxis.  DNR.  Discussed with patient, nursing staff, and consulting provider.  Anticipated discharge Pending PT and/or OT svitlana., TBD        Jewell Rios MD  [facility]  01/30/25  13:49 EST    I wore protective equipment throughout this patient encounter including gloves.  Hand hygiene was performed before donning protective equipment and after removal when leaving the room.         Copied text in this note has been reviewed and is  accurate as of 01/30/25.

## 2025-01-30 NOTE — PLAN OF CARE
Goal Outcome Evaluation:  Plan of Care Reviewed With: patient        Progress: improving  Outcome Evaluation: Pt seen for OT session this pm, pleasant and eager to participate. Pt UIC and agreeable to BUE AROM in all major planes 3x10 reps while sitting with (I) to work on increasing strength and fxnl reach during ADLs and xfers. Rest breaks given as pt needed. Pt tolerated exercises well. Plan for dc to home with assist and HH. OT will continue to monitor.    Anticipated Discharge Disposition (OT): home with assist, home with home health

## 2025-01-30 NOTE — DISCHARGE PLACEMENT REQUEST
"Papito Santoro (79 y.o. Male)       Date of Birth   1945    Social Security Number       Address   [address] [place] [address]    Home Phone   [phone]    MRN   [id number]       [facility]    Marital Status                               Admission Date   1/28/25    Admission Type   Emergency    Admitting Provider   Angy Bond MD    Attending Provider   Jewell Rios MD    Department, Room/Bed   [facility] [address], P390/1       Discharge Date       Discharge Disposition       Discharge Destination                                 Attending Provider: Jewell Rios MD    Allergies: Atorvastatin, Penicillins    Isolation: None   Infection: None   Code Status: No CPR    Ht: 172.7 cm (68\")   Wt: 63 kg (138 lb 14.2 oz)    Admission Cmt: None   Principal Problem: General weakness [R53.1]                   Active Insurance as of 1/28/2025       Primary Coverage       Payor Plan Insurance Group Employer/Plan Group    MEDICARE MEDICARE A & B        Payor Plan Address Payor Plan Phone Number Payor Plan Fax Number Effective Dates    [address] [phone]  12/1/2009 - None Entered    [place] [id number]         Subscriber Name Subscriber Birth Date Member ID       PAPITO SANTORO 1945 [id number]                     Emergency Contacts        (Rel.) Home Phone Work Phone Mobile Phone    Brooke Santoro (HCS) (Spouse) [phone] -- [phone]    ErickaPriyanka benson (Daughter) -- -- [phone]                "

## 2025-01-30 NOTE — PLAN OF CARE
Goal Outcome Evaluation:      Patient alert/oriented x4, forgetful. Takes medications crushed in applesauce. Bolus tube feeds administered through g-tube per order- tolerating well. Per neurology, care providers should try to spread out Carpidopa doses/tube feed bolus' by at least 2 hours to improve effectiveness of medication. Incontinence care. Ambulating/sitting in chair with x1 assist/walker.

## 2025-01-30 NOTE — PLAN OF CARE
Goal Outcome Evaluation:              Outcome Evaluation: Dysphagia treatment completed. Introduced patient to EMST75. Determined MAX strength at 84yuM1A. Pt completed 5 sets of 5 reps at 85ekM2E with MIN fading to NO cues. Provided tracking sheet and written education detailing EMST75 protocol/instructions. Pt agrees to complete EMST during and following hospitalization. Recommend continue mechanical ground, no mixed consistencies, and NTL diet. Meds crushed with puree. Small bites and sips, alternate liquids and solids and double swallows. Okay for free water protocol.

## 2025-01-31 LAB
ANION GAP SERPL CALCULATED.3IONS-SCNC: 8.8 MMOL/L (ref 5–15)
BUN SERPL-MCNC: 17 MG/DL (ref 8–23)
BUN/CREAT SERPL: 17.5 (ref 7–25)
CALCIUM SPEC-SCNC: 9.2 MG/DL (ref 8.6–10.5)
CHLORIDE SERPL-SCNC: 108 MMOL/L (ref 98–107)
CO2 SERPL-SCNC: 25.2 MMOL/L (ref 22–29)
CREAT SERPL-MCNC: 0.97 MG/DL (ref 0.76–1.27)
DEPRECATED RDW RBC AUTO: 53.8 FL (ref 37–54)
EGFRCR SERPLBLD CKD-EPI 2021: 78.9 ML/MIN/1.73
ERYTHROCYTE [DISTWIDTH] IN BLOOD BY AUTOMATED COUNT: 22 % (ref 12.3–15.4)
GLUCOSE SERPL-MCNC: 144 MG/DL (ref 65–99)
HCT VFR BLD AUTO: 52.7 % (ref 37.5–51)
HGB BLD-MCNC: 13.7 G/DL (ref 13–17.7)
MCH RBC QN AUTO: 19.4 PG (ref 26.6–33)
MCHC RBC AUTO-ENTMCNC: 26 G/DL (ref 31.5–35.7)
MCV RBC AUTO: 74.5 FL (ref 79–97)
PLATELET # BLD AUTO: 544 10*3/MM3 (ref 140–450)
PMV BLD AUTO: 9.8 FL (ref 6–12)
POTASSIUM SERPL-SCNC: 3.8 MMOL/L (ref 3.5–5.2)
RBC # BLD AUTO: 7.07 10*6/MM3 (ref 4.14–5.8)
SODIUM SERPL-SCNC: 142 MMOL/L (ref 136–145)
WBC NRBC COR # BLD AUTO: 18.87 10*3/MM3 (ref 3.4–10.8)

## 2025-01-31 PROCEDURE — 80048 BASIC METABOLIC PNL TOTAL CA: CPT | Performed by: STUDENT IN AN ORGANIZED HEALTH CARE EDUCATION/TRAINING PROGRAM

## 2025-01-31 PROCEDURE — 99231 SBSQ HOSP IP/OBS SF/LOW 25: CPT | Performed by: PSYCHIATRY & NEUROLOGY

## 2025-01-31 PROCEDURE — 92526 ORAL FUNCTION THERAPY: CPT | Performed by: SPEECH-LANGUAGE PATHOLOGIST

## 2025-01-31 PROCEDURE — 97110 THERAPEUTIC EXERCISES: CPT

## 2025-01-31 PROCEDURE — 85027 COMPLETE CBC AUTOMATED: CPT | Performed by: STUDENT IN AN ORGANIZED HEALTH CARE EDUCATION/TRAINING PROGRAM

## 2025-01-31 RX ADMIN — ALLOPURINOL 300 MG: 300 TABLET ORAL at 09:29

## 2025-01-31 RX ADMIN — CARBIDOPA AND LEVODOPA 1 TABLET: 25; 250 TABLET ORAL at 22:02

## 2025-01-31 RX ADMIN — CARBIDOPA AND LEVODOPA 1 TABLET: 25; 250 TABLET ORAL at 09:00

## 2025-01-31 RX ADMIN — APIXABAN 2.5 MG: 2.5 TABLET, FILM COATED ORAL at 22:02

## 2025-01-31 RX ADMIN — PANTOPRAZOLE SODIUM 40 MG: 40 TABLET, DELAYED RELEASE ORAL at 06:43

## 2025-01-31 RX ADMIN — HYDROXYUREA 500 MG: 500 CAPSULE ORAL at 09:29

## 2025-01-31 RX ADMIN — ENTACAPONE 200 MG: 200 TABLET, FILM COATED ORAL at 22:02

## 2025-01-31 RX ADMIN — CARBIDOPA AND LEVODOPA 1 TABLET: 25; 250 TABLET ORAL at 17:59

## 2025-01-31 RX ADMIN — ROSUVASTATIN CALCIUM 20 MG: 20 TABLET, FILM COATED ORAL at 22:02

## 2025-01-31 RX ADMIN — CARBIDOPA AND LEVODOPA 1 TABLET: 25; 250 TABLET ORAL at 12:09

## 2025-01-31 RX ADMIN — ENTACAPONE 200 MG: 200 TABLET, FILM COATED ORAL at 09:00

## 2025-01-31 RX ADMIN — APIXABAN 2.5 MG: 2.5 TABLET, FILM COATED ORAL at 09:29

## 2025-01-31 RX ADMIN — ENTACAPONE 200 MG: 200 TABLET, FILM COATED ORAL at 12:09

## 2025-01-31 RX ADMIN — LISINOPRIL 5 MG: 5 TABLET ORAL at 09:29

## 2025-01-31 RX ADMIN — ENTACAPONE 200 MG: 200 TABLET, FILM COATED ORAL at 17:59

## 2025-01-31 NOTE — PLAN OF CARE
Goal Outcome Evaluation:         Patient resting in bed, see MAR for medications administered. Tube feed bolus, as ordered. Last BM, this shift. Pt possibly to d/c to [facility] on Sunday. Plan of care is ongoing.

## 2025-01-31 NOTE — PLAN OF CARE
Goal Outcome Evaluation:  Plan of Care Reviewed With: patient, family        Progress: improving  Outcome Evaluation: Pt appears much stronger today per recent OT notes. He reports feeling improved today and was able to get up and take a shower eariler with nsg assistance. He declines wanting to work on ADLs this session. Session focus on UE exercises in both seated and standing as well as increase endurance. He completed upright activity/mobility for ~15 minutes. Occasionally needing a vc to initiate taking a step. He reports his plan is to dc to rehab. We discussed benefits of community program for pt's with PD once dc home.

## 2025-01-31 NOTE — CASE MANAGEMENT/SOCIAL WORK
Continued Stay Note  [facility]     Patient Name: Madhu Santoro  MRN: [id number]  Today's Date: 1/31/2025    Admit Date: 1/28/2025    Plan: [facility] possibly Sunday via family transport   Discharge Plan       Row Name 01/31/25 1048       Plan    Plan [facility] possibly Sunday via family transport    Plan Comments CCP spoke with [facility] who states they have a bed Sunday for this patient. RN notified patient of bed availability. CCP spoke with patient's spouse Brooke over the phone and notiifed of bed being available at [facility] on Sunday. Spouse states she can transport as long as [facility] can assist with getting him into her car, and [facility] can assist getting him out. Per [facility], we will need to send 1-2 days of tube feeds with the patient. [facility] notified RN & RD of the need for 1-2 days of tube feeds to be sent with patient on Sunday. Pharmacy has been updated and MD notified of bed availability. ANTOINE DANIELLE                   Discharge Codes    No documentation.                 Expected Discharge Date and Time       Expected Discharge Date Expected Discharge Time    Feb 2, 2025               ANTOINE Sandra

## 2025-01-31 NOTE — PROGRESS NOTES
Neurology Progress Note    Reason for visit  Follow up for Parkinson disease    Interval History  Patient reports no nausea, no hallucinations.  He has been up in room to bathroom and up to chair. Mobility increasing per aide.     Medications:  Scheduled Meds:allopurinol, 300 mg, Oral, Daily  apixaban, 2.5 mg, Oral, Q12H  carbidopa-levodopa, 1 tablet, Oral, 4x Daily  entacapone, 200 mg, Oral, 4x Daily  hydroxyurea, 500 mg, Oral, Daily  lisinopril, 5 mg, Oral, Daily  pantoprazole, 40 mg, Oral, Q AM  rosuvastatin, 20 mg, Oral, Nightly      Continuous Infusions:   PRN Meds:.  acetaminophen **OR** acetaminophen **OR** acetaminophen    senna-docusate sodium **AND** polyethylene glycol **AND** bisacodyl **AND** bisacodyl    HYDROcodone-acetaminophen    ondansetron    Review of Systems:   Review of Systems   All other systems reviewed and are negative.    Vital Signs  Temp:  [97.5 °F (36.4 °C)-97.7 °F (36.5 °C)] 97.5 °F (36.4 °C)  Heart Rate:  [47-76] 61  Resp:  [16-18] 18  BP: (142-165)/(55-89) 144/78    Physical Exam:  Constitutional: appears comfortable  HEENT:  no drooling  CVS:  Regular rate and rhythm.    Musculoskeletal:  No signs of peripheral edema  Neurologic:  Alert  Mild hypophonia  Mild generalized bradykinesia  No tremor  Power 5/5 BUE, moderate rigidity BLE with 5-/5 power  Psychiatric: no anxiety     Results Review:    TSH 3.710  B12 882    Medical Decision Making and Recommendations  Parkinson disease  Tolerating increased dopaminergic medication    Likely ready for discharge tomorrow.    Follow up with Dr. Russell as scheduled 3/2025    Neurology signing off, please call if needed further.      Cira Gomez MD  01/31/25  13:30 EST

## 2025-01-31 NOTE — PROGRESS NOTES
Name: Madhu Santoro ADMIT: 2025   : 1945  PCP: Damian Sanchez MD    MRN: [id number] LOS: 2 days   AGE/SEX: 79 y.o. male  ROOM: [facility]     Subjective   Subjective   Resting in his bed, no apparent distress. Remains weak.        Objective   Objective   Vital Signs  Temp:  [97.5 °F (36.4 °C)-97.7 °F (36.5 °C)] 97.5 °F (36.4 °C)  Heart Rate:  [47-76] 61  Resp:  [16-18] 18  BP: (142-165)/(55-89) 144/78  SpO2:  [92 %-97 %] 95 %  on   ;   Device (Oxygen Therapy): room air  Body mass index is 21.12 kg/m².  Physical Exam  Constitutional:       General: He is not in acute distress.     Appearance: He is ill-appearing. He is not toxic-appearing.   Cardiovascular:      Rate and Rhythm: Normal rate and regular rhythm.   Pulmonary:      Effort: Pulmonary effort is normal. No respiratory distress.      Breath sounds: Normal breath sounds. No wheezing.   Abdominal:      General: Abdomen is flat.      Palpations: Abdomen is soft.      Tenderness: There is no abdominal tenderness.   Musculoskeletal:         General: No tenderness.      Right lower leg: No edema.      Left lower leg: No edema.   Skin:     General: Skin is warm and dry.   Neurological:      Mental Status: He is alert and oriented to person, place, and time. Mental status is at baseline.      Motor: Weakness present.      Comments: 5/5 UE, 4/5 LE; rigidity of RUE; bradykinesis         Results Review     I reviewed the patient's new clinical results.  Results from last 7 days   Lab Units 25  1030 25  0524 25  0648 25  1327   WBC 10*3/mm3 18.87* 19.06* 26.40* 25.74*   HEMOGLOBIN g/dL 13.7 12.8* 13.8 14.8   PLATELETS 10*3/mm3 544* 588* 625* 721*     Results from last 7 days   Lab Units 25  1030 25  0524 25  0648 25  1327   SODIUM mmol/L 142 141 138 138   POTASSIUM mmol/L 3.8 3.8 4.7 4.0   CHLORIDE mmol/L 108* 108* 106 101   CO2 mmol/L 25.2 24.6 23.0 30.7*   BUN mg/dL 17 17 16 18   CREATININE mg/dL 0.97  "1.00 1.05 1.19   GLUCOSE mg/dL 144* 98 95 136*   Estimated Creatinine Clearance: 54.1 mL/min (by C-G formula based on SCr of 0.97 mg/dL).  Results from last 7 days   Lab Units 01/28/25  1327   ALBUMIN g/dL 4.1   BILIRUBIN mg/dL 0.9   ALK PHOS U/L 150*   AST (SGOT) U/L 26   ALT (SGPT) U/L 8     Results from last 7 days   Lab Units 01/31/25  1030 01/30/25  0524 01/29/25  0648 01/28/25  1327   CALCIUM mg/dL 9.2 8.9 9.3 9.9   ALBUMIN g/dL  --   --   --  4.1   MAGNESIUM mg/dL  --   --   --  2.3       COVID19   Date Value Ref Range Status   01/28/2025 Not Detected Not Detected - Ref. Range Final   01/07/2024 Not Detected Not Detected - Ref. Range Final     No results found for: \"HGBA1C\", \"POCGLU\"    XR Chest 1 View  Narrative: XR CHEST 1 VW-     HISTORY: Male who is 79 years-old, weakness     TECHNIQUE: Frontal view of the chest     COMPARISON: 3/9/2024     FINDINGS: The heart size is normal. Aorta is calcified. Pulmonary  vasculature is unremarkable. Old granulomatous disease is apparent.  Sternotomy wires are noted. No focal pulmonary consolidation, pleural  effusion, or pneumothorax. No acute osseous process.     Impression: No focal pulmonary consolidation. Follow-up as clinical  indications persist.     This report was finalized on 1/28/2025 1:45 PM by Dr. Mukesh Bruce M.D on Workstation: [id number]       Scheduled Medications  allopurinol, 300 mg, Oral, Daily  apixaban, 2.5 mg, Oral, Q12H  carbidopa-levodopa, 1 tablet, Oral, 4x Daily  entacapone, 200 mg, Oral, 4x Daily  hydroxyurea, 500 mg, Oral, Daily  lisinopril, 5 mg, Oral, Daily  pantoprazole, 40 mg, Oral, Q AM  rosuvastatin, 20 mg, Oral, Nightly    Infusions     Diet  Diet: Regular/House; No Mixed Consistencies; Texture: Soft to Chew (NDD 3); Soft to Chew: Ground Meat; Fluid Consistency: Nectar Thick         I have personally reviewed:  [x]  Laboratory   []  Microbiology   []  Radiology   [x]  EKG/Telemetry   []  Cardiology/Vascular   []  Pathology   []  " Records    Assessment/Plan     Active Hospital Problems    Diagnosis  POA    **General weakness [R53.1]  Yes    Generalized weakness [R53.1]  Yes    History of CVA (cerebrovascular accident) [Z86.73]  Not Applicable    Gout [M10.9]  Yes    Polycythemia vera [D45]  Yes    Polycythemia [D75.1]  Yes    PAD (peripheral artery disease) [I73.9]  Yes    S/P CABG (coronary artery bypass graft) [Z95.1]  Not Applicable    Benign essential hypertension [I10]  Yes    DDD (degenerative disc disease), lumbosacral [M51.379]  Yes    Anxiety [F41.9]  Yes    HLD (hyperlipidemia) [E78.5]  Yes      Resolved Hospital Problems   No resolved problems to display.       79 y.o. male admitted with General weakness.    Weakness, progressive  Parkinson's disease  Peripheral neuropathy  - d/w neurology- patient likely being undertreated for Parkinson's- increased dose recommended- patient's sinemet has been increased to QID on 1/29; Comtan added on 1/30- monitor for clinical response  - TSH wnl; B12 ok, per neuro- considering addition of  Comtan pending clinical course     CAD s/p CABG  Atrial flutter s/p ablation, on Eliquis   HTN  HLD  - cont home Eliquis, lisinopril    Hx of CVA  Carotid vascular disease  - cont home statin/eliquis    GERD  Dysphagia  - PPI  - complained of difficulty swallowing to RN- SLP to evaluate  - modified diet in place; speech therapy gave patient exercises to perform    Polycythemia vera/JAK2 mutation/essential thrombocythemia   - f/w CBC group; counts elevated but stable  - on hydrea    Lumbar degenerative disc disease    Gout  - allopurinol    SCDs for DVT prophylaxis.  DNR.  Discussed with patient and nursing staff.  Anticipated discharge Pending PT and/or OT svitlana., TBD        Jewell Rios MD  [facility]  01/31/25  13:13 EST    I wore protective equipment throughout this patient encounter including gloves.  Hand hygiene was performed before donning protective equipment and after  removal when leaving the room.         Copied text in this note has been reviewed and is accurate as of 01/31/25.

## 2025-01-31 NOTE — PROGRESS NOTES
"Nutrition Services    Patient Name:  Madhu Santoro  YOB: 1945  MRN: [id number]  Admit Date:  1/28/2025    Assessment Date:  01/31/25    CLINICAL NUTRITION    Comments: Follow up for tube feeding  Patient reports he is tolerating feedings well.  Still has 2 cases formula at home.  Today states he got 2.5 oz formula 2 x/day PTA instead of 1/2 carton after meals    Plan/Recommendations:   Continue current regimen of bolus feedings QID    RD to continue to monitor per protocol.     Encounter Information         Reason For Encounter Follow up for TF    Current Issues Yaw feeds well     Estimated Requirements         Calories  63 kg (35-40 kcal/kg)    Protein (gm)  63 kg (1.5 - 2.0 gm/kg)    Fluid (mL)  63 kg (Other: 1900 ml)     Current Nutrition Orders & Evaluation of Intake       Oral Nutrition     Current PO Diet Diet: Regular/House; No Mixed Consistencies; Texture: Soft to Chew (NDD 3); Soft to Chew: Ground Meat; Fluid Consistency: Nectar Thick   Supplement n/a   PO Evaluation     Trending % PO Intake     Factors Affecting Intake  altered mental status, decreased appetite      Enteral Nutrition     Enteral Route PEG    TF Delivery Method Bolus    Propofol Rate/Kcal     Current TF/Rate (mL) Nutren 2.0 @ 125 mL after each meal, 250 ml at bedtime    TF Goal Rate (mL)     Current Water Flush (mL) 50 Q feed    Modular None    TF Residual  no or minimal residual    TF Tolerance tolerating    TF Observation Other: order confirmed     Anthropometrics          Height    Weight Height: 172.7 cm (68\")  Weight: 63 kg (138 lb 14.2 oz) (01/28/25 2230)    BMI kg/m2 Body mass index is 21.12 kg/m².  Normal/Healthy (18.4 - 24.9)    Weight trend Loss     Labs        Pertinent Labs Reviewed, listed below     Results from last 7 days   Lab Units 01/31/25  1030 01/30/25  0524 01/29/25  0648 01/28/25  1327   SODIUM mmol/L 142 141 138 138   POTASSIUM mmol/L 3.8 3.8 4.7 4.0   CHLORIDE mmol/L 108* 108* 106 101   CO2 mmol/L " 25.2 24.6 23.0 30.7*   BUN mg/dL 17 17 16 18   CREATININE mg/dL 0.97 1.00 1.05 1.19   CALCIUM mg/dL 9.2 8.9 9.3 9.9   BILIRUBIN mg/dL  --   --   --  0.9   ALK PHOS U/L  --   --   --  150*   ALT (SGPT) U/L  --   --   --  8   AST (SGOT) U/L  --   --   --  26   GLUCOSE mg/dL 144* 98 95 136*     Results from last 7 days   Lab Units 01/31/25  1030 01/29/25  0648 01/28/25  1327   MAGNESIUM mg/dL  --   --  2.3   HEMOGLOBIN g/dL 13.7   < > 14.8   HEMATOCRIT % 52.7*   < > 53.5*   WBC 10*3/mm3 18.87*   < > 25.74*   ALBUMIN g/dL  --   --  4.1    < > = values in this interval not displayed.     Results from last 7 days   Lab Units 01/31/25  1030 01/30/25  0524 01/29/25  0648 01/28/25  1327   PLATELETS 10*3/mm3 544* 588* 625* 721*     COVID19   Date Value Ref Range Status   01/28/2025 Not Detected Not Detected - Ref. Range Final     Lab Results   Component Value Date    HGBA1C 5.50 11/04/2023          Medications            Scheduled Medications allopurinol, 300 mg, Oral, Daily  apixaban, 2.5 mg, Oral, Q12H  carbidopa-levodopa, 1 tablet, Oral, 4x Daily  entacapone, 200 mg, Oral, 4x Daily  hydroxyurea, 500 mg, Oral, Daily  lisinopril, 5 mg, Oral, Daily  pantoprazole, 40 mg, Oral, Q AM  rosuvastatin, 20 mg, Oral, Nightly        Infusions      PRN Medications   acetaminophen **OR** acetaminophen **OR** acetaminophen    senna-docusate sodium **AND** polyethylene glycol **AND** bisacodyl **AND** bisacodyl    HYDROcodone-acetaminophen    ondansetron     Physical Findings          Physical Appearance alert, other: did not seem to understand all of discussion   Oral/Mouth Cavity tooth or teeth missing   Edema  no edema   Gastrointestinal fecal incontinence, last bowel movement: 1/30   Skin  skin intact   Tubes/Drains PEG   NFPE See Malnutrition Severity Assessment     Malnutrition Severity Assessment      Patient meets criteria for : Moderate (non-severe) Malnutrition  Malnutrition Type (Last 8 Hours)       Malnutrition Severity  Assessment       Row Name 01/31/25 1836       Malnutrition Severity Assessment    Malnutrition Type Chronic Disease - Related Malnutrition      Row Name 01/31/25 1837       Insufficient Energy Intake     Insufficient Energy Intake Findings Severe    Insufficient Energy Intake  <50% of est. energy requirement for >or equal to 1 month      Row Name 01/31/25 1837       Unintentional Weight Loss     Unintentional Weight Loss Findings Severe    Unintentional Weight Loss  Weight loss greater than 10% in six months  11% weight loss in past 6 months      Row Name 01/31/25 1837       Muscle Loss    Loss of Muscle Mass Findings Moderate    [place] Region Moderate - slight depression    Clavicle Bone Region Moderate - some protrusion in females, visible in males    Acromion Bone Region Moderate - acromion may slightly protrude    Dorsal Hand Region Moderate - slight depression    Patellar Region Moderate - patella more prominent, less muscle definition around patella    Posterior Calf Region Moderate - some roundness, slight firmness      Row Name 01/31/25 1837       Fat Loss    Subcutaneous Fat Loss Findings Moderate    Orbital Region  Moderate -  somewhat hollowness, slightly dark circles    Upper Arm Region Moderate - some fat tissue, not ample      Row Name 01/31/25 1837       Declining Functional Status    Declining Functional Status Findings Measurably Reduced      Row Name 01/31/25 1837       Criteria Met (Must meet criteria for severity in at least 2 of these categories: M Wasting, Fat Loss, Fluid, Secondary Signs, Wt. Status, Intake)    Patient meets criteria for  Moderate (non-severe) Malnutrition                       NUTRITION INTERVENTION / PLAN OF CARE  Intervention Goal         Intervention Goal(s) Meet estimated needs, Increase intake, Tolerate TF/PN at goal, and Maintain weight     Nutrition Intervention         RD Action Encourage intake, Continue to monitor, and Care plan reviewed     Prescription          Diet Prescription     Supplement Prescription    EN/PN Prescription    New Prescription Ordered? Continue same per protocol   --  Monitor/Evaluation        Monitor PO intake, Pertinent labs, EN delivery/tolerance, Weight, Skin status, GI status, Symptoms   Discharge Needs Pending clinical course       Electronically signed by:  James Méndez RD  01/31/25 18:35 EST

## 2025-01-31 NOTE — SIGNIFICANT NOTE
01/31/25 1517   OTHER   Discipline physical therapist   Rehab Time/Intention   Session Not Performed other (see comments)  (Patient declined participation in PT reporting he has already been up with OT. PT will f/u. Patient plans SNF on Sunday.)

## 2025-01-31 NOTE — THERAPY TREATMENT NOTE
Acute Care - Speech Language Pathology   Swallow Treatment Note [facility]     Patient Name: Madhu Santoro  : 1945  MRN: [id number]  Today's Date: 2025               Admit Date: 2025    Visit Dx:     ICD-10-CM ICD-9-CM   1. General weakness  R53.1 780.79   2. Difficulty walking  R26.2 719.7   3. Parkinson's disease, unspecified whether dyskinesia present, unspecified whether manifestations fluctuate  G20.A1 332.0   4. Viral URI with cough  J06.9 465.9   5. Dysuria  R30.0 788.1   6. Hyperglycemia  R73.9 790.29   7. Elevated troponin  R79.89 790.6   8. Polycythemia vera  D45 238.4     Patient Active Problem List   Diagnosis    Anxiety    Arthritis    Coronary artery disease due to lipid rich plaque    HLD (hyperlipidemia)    Benign essential hypertension    DDD (degenerative disc disease), lumbosacral    Cerebrovascular accident    Encounter for screening for cardiovascular disorders    Gastroesophageal reflux disease    Hyperlipidemia    Stenosis of carotid artery    Former smoker    History of cardiac catheterization    S/P ablation of atrial flutter    Typical atrial flutter    S/P CABG (coronary artery bypass graft)    Adenomatous polyp of ascending colon    Abdominal bloating    Stroke    PAD (peripheral artery disease)    Acute cholecystitis    Right upper quadrant abdominal pain    Chronic pain of both hips    Chronic bilateral low back pain without sciatica    Sacroiliac joint dysfunction of both sides    Encounter for long-term (current) use of high-risk medication    Lumbar facet arthropathy    Stroke-like symptoms    CVA (cerebral vascular accident)    Personal history of colonic polyps    Arthralgia of multiple joints    Iron deficiency    Thrombocytosis    Left ureteral stone    Obstructive uropathy    Chronic anticoagulation    Facial skin lesion    Iron deficiency anemia    Polycythemia    Neuropathy    Weakness    Pneumonia    Close exposure to COVID-19 virus    Polycythemia  "vera    Gout    COVID-19    Cytokine release syndrome, grade 2    Anemia    History of CVA (cerebrovascular accident)    S/P percutaneous endoscopic gastrostomy (PEG) tube placement    Mandel esophagus    Sacral decubitus ulcer    Oral phase dysphagia    Acute blood loss anemia    Orthostatic dizziness    Orthostatic hypotension    Bilateral leg weakness    General weakness    Generalized weakness     Past Medical History:   Diagnosis Date    Anxiety     Arthritis     Atrial flutter     Status post cavotricuspid isthmus ablation by Dr. Gustafson on 4/18/17    Mandel esophagus     Benign essential hypertension     CAD (coronary artery disease)     3 vessel CABG 4/11/17 by Dr. Cortez: ROSARIO-prox LAD, SVG-OM1, SVG-OM3    Carotid artery disease     Status post carotid endarterectomy - USG 4/10/17: 50-59% NICHELLE, 1-15% LICA.     Colonic polyp     COVID-19 long hauler     Cyst of pancreas     DDD (degenerative disc disease), lumbosacral     GERD (gastroesophageal reflux disease)     H/O bone density study 2013    H/O complete eye exam 2014    History of kidney stone     HLD (hyperlipidemia)     Hypertension     Kidney stone     8/22/22    Lipid screening 05/31/2013    Low back pain     physical therapy [facility] rehab 5-12-10    Parkinson disease     Screening for prostate cancer 07/07/2015    Skin cancer     nose    Stroke     RESIDUAL--\"BALANCE ISSUES\"     Past Surgical History:   Procedure Laterality Date    CARDIAC CATHETERIZATION N/A 04/10/2017    Procedure: Left Heart Cath;  Surgeon: Marjorie Heayl MD;  Location:  [facility]AS[facility];  Service:     CARDIAC CATHETERIZATION N/A 04/10/2017    Procedure: Coronary angiography;  Surgeon: Marjorie Healy MD;  Location:  [facility];  Service:     CARDIAC CATHETERIZATION N/A 04/10/2017    Procedure: Left ventriculography;  Surgeon: Marjorie Healy MD;  Location:  [facility];  Service:     CARDIAC CATHETERIZATION  2011    CARDIAC " ELECTROPHYSIOLOGY PROCEDURE N/A 04/18/2017    Procedure: Ablation atrial flutter;  Surgeon: Jose Antonio Gustafson MD;  Location: [facility] CATH INVASIVE LOCATION;  Service:     CAROTID ENDARTERECTOMY      CHOLECYSTECTOMY      CHOLECYSTECTOMY WITH INTRAOPERATIVE CHOLANGIOGRAM N/A 09/07/2019    Procedure: Laparoscopic cholecystectomy with intraoperative cholangiogram;  Surgeon: Gauri Travis MD;  Location: [facility] MAIN OR;  Service: General    COLONOSCOPY  01/06/2015    Diverticulosis, one TA    COLONOSCOPY N/A 02/14/2019    tics, NBIH, adenomatous polyp x 2    COLONOSCOPY N/A 11/05/2021    Procedure: COLONOSCOPY TO CECUM AND TERM. ILEUM WITH COLD POLYPECTOMIES;  Surgeon: Everton Abel MD;  Location: [facility] ENDOSCOPY;  Service: Gastroenterology;  Laterality: N/A;  PRE OP - PERS H/O POLYPS  POST OP - COLON POLYPS,, DIVERTICULOSIS, HEMORRHOIDS    CORONARY ARTERY BYPASS GRAFT N/A 04/11/2017    Procedure: AR STERNOTOMY CORONARY ARTERY BYPASS GRAFT TIMES 3 USING LEFT INTERNAL MAMMARY ARTERY AND LEFT GREATER SAPHENOUS VEIN GRAFT PER ENDOSCOPIC VEIN HARVESTING AND PRP ;  Surgeon: Temo Cortez MD;  Location: [facility] MAIN OR;  Service:     ENDOSCOPY  01/06/2015    HH, Ervin's esophagus    ENDOSCOPY N/A 02/14/2019    Z line irregular, HH, Ervin's esophagus    ENDOSCOPY N/A 11/05/2021    Procedure: ESOPHAGOGASTRODUODENOSCOPY WITH BIOPSIES;  Surgeon: Everton Abel MD;  Location: [facility] ENDOSCOPY;  Service: Gastroenterology;  Laterality: N/A;  PRE OP - PERS H/O ERVIN'S  POST OP - IRREG Z LINE    ENDOSCOPY W/ PEG TUBE PLACEMENT N/A 11/6/2023    Procedure: ESOPHAGOGASTRODUODENOSCOPY WITH PERCUTANEOUS ENDOSCOPIC GASTROSTOMY TUBE INSERTION;  Surgeon: Temo Ramsey MD;  Location: [facility] ENDOSCOPY;  Service: General;  Laterality: N/A;  Pre: dysphagia  Post: same    KNEE SURGERY Left     URETEROSCOPY LASER LITHOTRIPSY WITH STENT INSERTION Left 8/23/2022    Procedure: Cysto retrograde with left uretro stent  placement;  Surgeon: Damien Oliveira MD;  Location: [facility] MAIN OR;  Service: Urology;  Laterality: Left;    VASECTOMY         SLP Recommendation and Plan                                                                               Outcome Evaluation: Clinical swallow evaluation completed recommend mechanical soft ground no mixed consistencies and NTL. Meds crushed with puree. Small bites and sips, alternate liquids and solids and double swallows.      SWALLOW EVALUATION (Last 72 Hours)       SLP Adult Swallow Evaluation       Row Name 01/31/25 1542 01/30/25 1200 01/29/25 1200             Rehab Evaluation    Document Type therapy note (daily note)  -KA therapy note (daily note)  -CR evaluation  -KA      Subjective Information no complaints  -KA no complaints  -CR no complaints  -KA      Patient Observations alert;cooperative  -KA alert;cooperative  -CR alert;cooperative  -KA      Patient Effort good  -KA good  -CR good  -KA      Symptoms Noted During/After Treatment none  -KA none  -CR none  -KA         General Information    Patient Profile Reviewed yes  -KA yes  -CR yes  -KA      Pertinent History Of Current Problem -- -- Patient admitted with weakness, difficulty walking. Hx of Parkinsons disease, CVA and dysphagia with PEG. Multiple VFSSs in the past, most recent on 10/21/2024 recommended mechanical soft no mixed and NTL. Pt does not recall drinking NTL at home. Chest XRAY shows no focal pulmonary consolidation.  -KA      Current Method of Nutrition -- -- regular textures;thin liquids  -KA      Precautions/Limitations, Vision -- -- WFL;for purposes of eval  -KA      Precautions/Limitations, Hearing -- -- WFL;for purposes of eval  -KA      Prior Level of Function-Swallowing -- -- other (see comments)  see history  -KA      Plans/Goals Discussed with -- -- patient  -KA      Barriers to Rehab -- -- medically complex  question baseline cognition and recall of previous VFSSs results  -KA      Patient's Goals  for Discharge -- -- patient did not state  -         Pain    Pretreatment Pain Rating -- -- 0/10 - no pain  -      Posttreatment Pain Rating -- -- 0/10 - no pain  -         Oral Motor Structure and Function    Dentition Assessment -- -- natural, present and adequate  -      Secretion Management -- -- WNL/WFL  -      Mucosal Quality -- -- moist, healthy  -         Oral Musculature and Cranial Nerve Assessment    Oral Motor General Assessment -- -- generalized oral motor weakness  -         General Eating/Swallowing Observations    Eating/Swallowing Skills -- -- self-fed  -      Positioning During Eating -- -- upright in chair  -      Utensils Used -- -- spoon;cup;straw  -      Consistencies Trialed -- -- soft to chew textures;chopped;mixed consistency;pureed;thin liquids;nectar/syrup-thick liquids  -         Clinical Swallow Eval    Clinical Swallow Evaluation Summary -- -- With thins via straw and cup pt demonstrated audible swallows and immediate throat clearing via straw and delayed throat clearing via cup. No overt s/s of pen/asp with NTL, puree and mechanical soft chopped consistency. Pt stated he felt like the peaches were stuck in his throat and with cued multiple swallows and NTL wash pt reported that assisted in clearing. SLP reviewed results of VFSS with patient and importance of swallow precautions: alternate liquids and solids, multiple swallows and or hard swallows, small bites and sips.  -         SLP Evaluation Clinical Impression    SLP Swallowing Diagnosis -- -- oral dysphagia;pharyngeal dysphagia;moderate  -      Functional Impact -- -- risk of aspiration/pneumonia  -      Rehab Potential/Prognosis, Swallowing -- -- good, to achieve stated therapy goals  -      Swallow Criteria for Skilled Therapeutic Interventions Met -- -- demonstrates skilled criteria  -         Recommendations    Therapy Frequency (Swallow) -- -- PRN  -      Predicted Duration Therapy  Intervention (Days) -- -- until discharge  -      SLP Diet Recommendation -- -- soft to chew textures;ground;no mixed consistencies;nectar thick liquids;water between meals after oral care, with supervision;ice chips between meals after oral care, with supervision  -      Recommended Precautions and Strategies -- -- upright posture during/after eating;small bites of food and sips of liquid;multiple swallows per bite of food;multiple swallows per sip of liquid;alternate between small bites of food and sips of liquid;general aspiration precautions  -      Oral Care Recommendations -- -- Oral Care BID/PRN  -      SLP Rec. for Method of Medication Administration -- -- meds crushed;with puree;meds via alternate route  -      Monitor for Signs of Aspiration -- -- yes;notify SLP if any concerns  -      Anticipated Discharge Disposition (SLP) -- -- anticipate therapy at next level of care  -         Swallow Goals (SLP)    Swallow STGs -- pharyngeal strengthening exercise goal selection (SLP)  -CR diet tolerance goal selection (SLP)  -KA      Diet Tolerance Goal Selection (SLP) -- -- Patient will tolerate trials of  -KA      Pharyngeal Strengthening Exercise Goal Selection (SLP) -- pharyngeal strengthening exercise, SLP goal 1  -CR --         (STG) Patient will tolerate trials of    Consistencies Trialed (Tolerate trials) -- -- soft to chew (ground) textures;nectar/ mildly thick liquids  -      Desired Outcome (Tolerate trials) -- -- without signs/symptoms of aspiration  -KA      [place] (Tolerate trials) -- -- independently (over 90% accuracy)  -         ([facility]) Pharyngeal Strengthening Exercise Goal 1 (SLP)    Activity (Pharyngeal Strengthening Goal 1, SLP) increase timing  -KA increase timing  -CR --      Increase Timing EMST  -KA EMST  -CR --      [place]/Accuracy (Pharyngeal Strengthening Goal 1, SLP) with minimal cues (75-90% accuracy)  -KA with minimal cues (75-90% accuracy)  -CR --      Time  Frame (Pharyngeal Strengthening Goal 1, SLP) by discharge  -KA by discharge  -CR --      Progress/Outcomes (Pharyngeal Strengthening Goal 1, SLP) good progress toward goal  -KA new goal;good progress toward goal  -CR --      Comment (Pharyngeal Strengthening Goal 1, SLP) Dysphagia therapy completed today, daughter present and SLP educated her on EMST lite, therapy and results of previous VFSS in 10/24 and risks for aspiration. Pt completed 5 sets of 5 breaths at [id number] with min cues to increase strength of exhalation through the device. SLP introduced effortful swallow,pt completed x5 reps with increased time needed/rest between each rep. SLP discussed importance he continue EMST and exercises over the weekend. Pt and daughter voiced understanding of education.  -KA Dysphagia treatment completed. Introduced patient to EMST75. Determined MAX strength at [id number]. Pt completed 5 sets of 5 reps at [id number] with MIN fading to NO cues. Provided tracking sheet and written education detailing EMST75 protocol/instructions. Pt agrees to complete EMST during and following hospitalization. Wet voice and productive cough x2 observed during treatment. SLP provided education regarding risk of aspiration and safest diet being soft/ground with nectar thick liquids. Pt reports he will likely return to unrestricted diet upon discharge. SLP encouraged pt to utilize safe swallow strategies and continue completing EMST HEP. Recommend continue mechanical ground, no mixed consistencies, and NTL diet. Meds crushed with puree. Small bites and sips, alternate liquids and solids and double swallows. Okay for free water protocol.  -CR --                User Key  (r) = Recorded By, (t) = Taken By, (c) = Cosigned By      Initials Name Effective Dates    Valentino Geller SLP 01/05/24 -     Adry Saldaña SLP 12/03/24 -                     EDUCATION  The patient has been educated in the following areas:   Dysphagia (Swallowing Impairment).         SLP GOALS       Row Name 01/31/25 1542 01/30/25 1200 01/29/25 1200       (STG) Patient will tolerate trials of    Consistencies Trialed (Tolerate trials) -- -- soft to chew (ground) textures;nectar/ mildly thick liquids  -KA    Desired Outcome (Tolerate trials) -- -- without signs/symptoms of aspiration  -KA    [place] (Tolerate trials) -- -- independently (over 90% accuracy)  -KA       ([facility]) Pharyngeal Strengthening Exercise Goal 1 (SLP)    Activity (Pharyngeal Strengthening Goal 1, SLP) increase timing  -KA increase timing  -CR --    Increase Timing EMST  -KA EMST  -CR --    [place]/Accuracy (Pharyngeal Strengthening Goal 1, SLP) with minimal cues (75-90% accuracy)  -KA with minimal cues (75-90% accuracy)  -CR --    Time Frame (Pharyngeal Strengthening Goal 1, SLP) by discharge  -KA by discharge  -CR --    Progress/Outcomes (Pharyngeal Strengthening Goal 1, SLP) good progress toward goal  -KA new goal;good progress toward goal  -CR --    Comment (Pharyngeal Strengthening Goal 1, SLP) Dysphagia therapy completed today, daughter present and SLP educated her on EMST lite, therapy and results of previous VFSS in 10/24 and risks for aspiration. Pt completed 5 sets of 5 breaths at [id number] with min cues to increase strength of exhalation through the device. SLP introduced effortful swallow,pt completed x5 reps with increased time needed/rest between each rep. SLP discussed importance he continue EMST and exercises over the weekend. Pt and daughter voiced understanding of education.  -KA Dysphagia treatment completed. Introduced patient to EMST75. Determined MAX strength at 45pcR2O. Pt completed 5 sets of 5 reps at [id number] with MIN fading to NO cues. Provided tracking sheet and written education detailing EMST75 protocol/instructions. Pt agrees to complete EMST during and following hospitalization. Wet voice and productive cough x2 observed during treatment. SLP provided education regarding risk of  aspiration and safest diet being soft/ground with nectar thick liquids. Pt reports he will likely return to unrestricted diet upon discharge. SLP encouraged pt to utilize safe swallow strategies and continue completing EMST HEP. Recommend continue mechanical ground, no mixed consistencies, and NTL diet. Meds crushed with puree. Small bites and sips, alternate liquids and solids and double swallows. Okay for free water protocol.  -CR --              User Key  (r) = Recorded By, (t) = Taken By, (c) = Cosigned By      Initials Name Provider Type    Valentino Geller SLP Speech and Language Pathologist    Adry Saldaña SLP Speech and Language Pathologist                         Time Calculation:    Time Calculation- SLP       Row Name 01/31/25 1549             Time Calculation- SLP    SLP Start Time 1455  -KA      SLP Stop Time 1550  -KA      SLP Time Calculation (min) 55 min  -KA         Untimed Charges    24850-[id number] Treatment Swallow Minutes 55  -KA         Total Minutes    Untimed Charges Total Minutes 55  -KA       Total Minutes 55  -KA                User Key  (r) = Recorded By, (t) = Taken By, (c) = Cosigned By      Initials Name Provider Type    Valentino Geller SLP Speech and Language Pathologist                    Therapy Charges for Today       Code Description Service Date Service Provider Modifiers Qty    [id number] HC ST TREATMENT SWALLOW 4 1/31/2025 Valentino Peralta SLP GN 1                 GONZÁLEZ Michaud  1/31/2025

## 2025-01-31 NOTE — THERAPY TREATMENT NOTE
Patient Name: Madhu Santoro  : 1945    MRN: [id number]                              Today's Date: 2025       Admit Date: 2025    Visit Dx:     ICD-10-CM ICD-9-CM   1. General weakness  R53.1 780.79   2. Difficulty walking  R26.2 719.7   3. Parkinson's disease, unspecified whether dyskinesia present, unspecified whether manifestations fluctuate  G20.A1 332.0   4. Viral URI with cough  J06.9 465.9   5. Dysuria  R30.0 788.1   6. Hyperglycemia  R73.9 790.29   7. Elevated troponin  R79.89 790.6   8. Polycythemia vera  D45 238.4     Patient Active Problem List   Diagnosis    Anxiety    Arthritis    Coronary artery disease due to lipid rich plaque    HLD (hyperlipidemia)    Benign essential hypertension    DDD (degenerative disc disease), lumbosacral    Cerebrovascular accident    Encounter for screening for cardiovascular disorders    Gastroesophageal reflux disease    Hyperlipidemia    Stenosis of carotid artery    Former smoker    History of cardiac catheterization    S/P ablation of atrial flutter    Typical atrial flutter    S/P CABG (coronary artery bypass graft)    Adenomatous polyp of ascending colon    Abdominal bloating    Stroke    PAD (peripheral artery disease)    Acute cholecystitis    Right upper quadrant abdominal pain    Chronic pain of both hips    Chronic bilateral low back pain without sciatica    Sacroiliac joint dysfunction of both sides    Encounter for long-term (current) use of high-risk medication    Lumbar facet arthropathy    Stroke-like symptoms    CVA (cerebral vascular accident)    Personal history of colonic polyps    Arthralgia of multiple joints    Iron deficiency    Thrombocytosis    Left ureteral stone    Obstructive uropathy    Chronic anticoagulation    Facial skin lesion    Iron deficiency anemia    Polycythemia    Neuropathy    Weakness    Pneumonia    Close exposure to COVID-19 virus    Polycythemia vera    Gout    COVID-19    Cytokine release syndrome, grade 2  "   Anemia    History of CVA (cerebrovascular accident)    S/P percutaneous endoscopic gastrostomy (PEG) tube placement    Mandel esophagus    Sacral decubitus ulcer    Oral phase dysphagia    Acute blood loss anemia    Orthostatic dizziness    Orthostatic hypotension    Bilateral leg weakness    General weakness    Generalized weakness     Past Medical History:   Diagnosis Date    Anxiety     Arthritis     Atrial flutter     Status post cavotricuspid isthmus ablation by Dr. Gustafson on 4/18/17    Mandel esophagus     Benign essential hypertension     CAD (coronary artery disease)     3 vessel CABG 4/11/17 by Dr. Cortez: ROSARIO-prox LAD, SVG-OM1, SVG-OM3    Carotid artery disease     Status post carotid endarterectomy - USG 4/10/17: 50-59% NICHELLE, 1-15% LICA.     Colonic polyp     COVID-19 long hauler     Cyst of pancreas     DDD (degenerative disc disease), lumbosacral     GERD (gastroesophageal reflux disease)     H/O bone density study 2013    H/O complete eye exam 2014    History of kidney stone     HLD (hyperlipidemia)     Hypertension     Kidney stone     8/22/22    Lipid screening 05/31/2013    Low back pain     [facility]    Parkinson disease     Screening for prostate cancer 07/07/2015    Skin cancer     nose    Stroke     RESIDUAL--\"BALANCE ISSUES\"     Past Surgical History:   Procedure Laterality Date    CARDIAC CATHETERIZATION N/A 04/10/2017    Procedure: Left Heart Cath;  Surgeon: Marjorie Healy MD;  Location:  [facility] CATH [facility]VASIVE [facility];  Service:     CARDIAC CATHETERIZATION N/A 04/10/2017    Procedure: Coronary angiography;  Surgeon: Marjorie Healy MD;  Location:  [facility];  Service:     CARDIAC CATHETERIZATION N/A 04/10/2017    Procedure: Left ventriculography;  Surgeon: Marjorie Healy MD;  Location:  [facility];  Service:     CARDIAC CATHETERIZATION  2011    CARDIAC ELECTROPHYSIOLOGY PROCEDURE N/A 04/18/2017    Procedure: Ablation " atrial flutter;  Surgeon: Jose Antonio Gustafson MD;  Location: [facility] CATH INVASIVE LOCATION;  Service:     CAROTID ENDARTERECTOMY      CHOLECYSTECTOMY      CHOLECYSTECTOMY WITH INTRAOPERATIVE CHOLANGIOGRAM N/A 09/07/2019    Procedure: Laparoscopic cholecystectomy with intraoperative cholangiogram;  Surgeon: Gauri Travis MD;  Location: [facility] MAIN OR;  Service: General    COLONOSCOPY  01/06/2015    Diverticulosis, one TA    COLONOSCOPY N/A 02/14/2019    tics, NBIH, adenomatous polyp x 2    COLONOSCOPY N/A 11/05/2021    Procedure: COLONOSCOPY TO CECUM AND TERM. ILEUM WITH COLD POLYPECTOMIES;  Surgeon: Everton Abel MD;  Location: [facility] ENDOSCOPY;  Service: Gastroenterology;  Laterality: N/A;  PRE OP - PERS H/O POLYPS  POST OP - COLON POLYPS,, DIVERTICULOSIS, HEMORRHOIDS    CORONARY ARTERY BYPASS GRAFT N/A 04/11/2017    Procedure: AR STERNOTOMY CORONARY ARTERY BYPASS GRAFT TIMES 3 USING LEFT INTERNAL MAMMARY ARTERY AND LEFT GREATER SAPHENOUS VEIN GRAFT PER ENDOSCOPIC VEIN HARVESTING AND PRP ;  Surgeon: Temo Cortez MD;  Location: [facility] MAIN OR;  Service:     ENDOSCOPY  01/06/2015    HH, Ervin's esophagus    ENDOSCOPY N/A 02/14/2019    Z line irregular, HH, Ervin's esophagus    ENDOSCOPY N/A 11/05/2021    Procedure: ESOPHAGOGASTRODUODENOSCOPY WITH BIOPSIES;  Surgeon: Everton Abel MD;  Location: [facility] ENDOSCOPY;  Service: Gastroenterology;  Laterality: N/A;  PRE OP - PERS H/O ERVIN'S  POST OP - IRREG Z LINE    ENDOSCOPY W/ PEG TUBE PLACEMENT N/A 11/6/2023    Procedure: ESOPHAGOGASTRODUODENOSCOPY WITH PERCUTANEOUS ENDOSCOPIC GASTROSTOMY TUBE INSERTION;  Surgeon: Temo Ramsey MD;  Location: [facility] ENDOSCOPY;  Service: General;  Laterality: N/A;  Pre: dysphagia  Post: same    KNEE SURGERY Left     URETEROSCOPY LASER LITHOTRIPSY WITH STENT INSERTION Left 8/23/2022    Procedure: Cysto retrograde with left uretro stent placement;  Surgeon: Damien Oliveira MD;  Location: [facility] MAIN OR;   Service: Urology;  Laterality: Left;    VASECTOMY        General Information       Row Name 01/31/25 1334          OT Time and Intention    Document Type therapy note (daily note)  -     Mode of Treatment occupational therapy;individual therapy  -       Row Name 01/31/25 1334          General Information    Patient Profile Reviewed yes  -     Existing Precautions/Restrictions fall  -       Row Name 01/31/25 1334          Cognition    Orientation Status (Cognition) oriented x 4  -       Row Name 01/31/25 1334          Safety Issues/Impairments Affecting Functional Mobility    Impairments Affecting Function (Mobility) balance;endurance/activity tolerance;strength;postural/trunk control  -               User Key  (r) = Recorded By, (t) = Taken By, (c) = Cosigned By      Initials Name Provider Type     Trang Lee, MEHDI Occupational Therapist                     Mobility/ADL's       Row Name 01/31/25 1334          Bed Mobility    Bed Mobility sit-supine;scooting/bridging  -     Scooting/Bridging [place] (Bed Mobility) minimum assist (75% patient effort)  -     Supine-Sit [place] (Bed Mobility) minimum assist (75% patient effort)  -     Sit-Supine [place] (Bed Mobility) contact guard  -     Assistive Device (Bed Mobility) head of bed elevated;bed rails  -       Row Name 01/31/25 1334          Sit-Stand Transfer    Sit-Stand [place] (Transfers) contact guard;verbal cues  -     Assistive Device (Sit-Stand Transfers) walker, front-wheeled  -[facility] Name 01/31/25 1332          Functional Mobility    Functional Mobility- Ind. Level contact guard assist  -     Functional Mobility- Device walker, front-wheeled  -     Functional Mobility- Comment distance for in room ADLs, out into silva for household mobility endurance  -       Row Name 01/31/25 1333          Self-Feeding Assessment/Training    [place] Level (Feeding) independent  -               User Key  (r) =  Recorded By, (t) = Taken By, (c) = Cosigned By      Initials Name Provider Type    Trang Poole OT Occupational Therapist                   Obj/Interventions       Row Name 01/31/25 1335          Shoulder (Therapeutic Exercise)    Shoulder AROM (Therapeutic Exercise) bilateral;sitting;standing;10 repetitions  overhead flex, forward punches  -       Row Name 01/31/25 1335          Balance    Static Sitting Balance standby assist  -     Position, Sitting Balance sitting edge of bed  -     Static Standing Balance standby assist  -     Dynamic Standing Balance contact guard  -     Position/Device Used, Standing Balance supported;walker, rolling  -               User Key  (r) = Recorded By, (t) = Taken By, (c) = Cosigned By      Initials Name Provider Type    Trang Poole OT Occupational Therapist                   Goals/Plan    No documentation.                  Clinical Impression       Row Name 01/31/25 1330          Pain Assessment    Pretreatment Pain Rating 0/10 - no pain  -     Posttreatment Pain Rating 0/10 - no pain  -       Row Name 01/31/25 1334          Plan of Care Review    Plan of Care Reviewed With patient;family  -     Progress improving  -     Outcome Evaluation Pt appears much stronger today per recent OT notes. He reports feeling improved today and was able to get up and take a shower eariler with nsg assistance. He declines wanting to work on ADLs this session. Session focus on UE exercises in both seated and standing as well as increase endurance. He completed upright activity/mobility for ~15 minutes. Occasionally needing a vc to initiate taking a step. He reports his plan is to dc to rehab. We discussed benefits of community program for pt's with PD once dc home.  -       Row Name 01/31/25 1339          Positioning and Restraints    Pre-Treatment Position in bed  -SM     Post Treatment Position bed  -SM     In Bed fowlers;call light within reach;encouraged  to call for assist;exit alarm on;notified nsg  -               User Key  (r) = Recorded By, (t) = Taken By, (c) = Cosigned By      Initials Name Provider Type    Trang Poole OT Occupational Therapist                   Outcome Measures       Row Name 01/31/25 1341          How much help from another is currently needed...    Putting on and taking off regular lower body clothing? 3  -SM     Bathing (including washing, rinsing, and drying) 3  -SM     Toileting (which includes using toilet bed pan or urinal) 3  -SM     Putting on and taking off regular upper body clothing 3  -SM     Taking care of personal grooming (such as brushing teeth) 3  -SM     Eating meals 4  -SM     AM-PAC 6 Clicks Score (OT) 19  -SM       Row Name 01/31/25 1341          Functional Assessment    Outcome Measure Options AM-PAC 6 Clicks Daily Activity (OT)  -SM               User Key  (r) = Recorded By, (t) = Taken By, (c) = Cosigned By      Initials Name Provider Type    Trang Poole OT Occupational Therapist                    Occupational Therapy Education       Title: PT OT SLP Therapies (In Progress)       Topic: Occupational Therapy (In Progress)       Point: ADL training (Done)       Description:   Instruct learner(s) on proper safety adaptation and remediation techniques during self care or transfers.   Instruct in proper use of assistive devices.                  Learning Progress Summary            Patient Acceptance, E, VU by MM at 1/29/2025 3541    Comment: role of OT, d/c rec, plan of care                      Point: Home exercise program (Not Started)       Description:   Instruct learner(s) on appropriate technique for monitoring, assisting and/or progressing therapeutic exercises/activities.                  Learner Progress:  Not documented in this visit.              Point: Precautions (Done)       Description:   Instruct learner(s) on prescribed precautions during self-care and functional transfers.                   Learning Progress Summary            Patient Acceptance, E, VU by  at 1/29/2025 1256    Comment: role of OT, d/c rec, plan of care                      Point: Body mechanics (Done)       Description:   Instruct learner(s) on proper positioning and spine alignment during self-care, functional mobility activities and/or exercises.                  Learning Progress Summary            Patient Acceptance, E, VU by MM at 1/29/2025 1256    Comment: role of OT, d/c rec, plan of care                                      User Key       Initials Effective Dates Name Provider Type Discipline     05/31/24 -  Gris Cuello OT Occupational Therapist OT                  OT Recommendation and Plan     Plan of Care Review  Plan of Care Reviewed With: patient, family  Progress: improving  Outcome Evaluation: Pt appears much stronger today per recent OT notes. He reports feeling improved today and was able to get up and take a shower eariler with nsg assistance. He declines wanting to work on ADLs this session. Session focus on UE exercises in both seated and standing as well as increase endurance. He completed upright activity/mobility for ~15 minutes. Occasionally needing a vc to initiate taking a step. He reports his plan is to dc to rehab. We discussed benefits of community program for pt's with PD once dc home.     Time Calculation:         Time Calculation- OT       Row Name 01/31/25 1342             Time Calculation- OT    OT Start Time 1258  -      OT Stop Time 1321  -      OT Time Calculation (min) 23 min  -      Total Timed Code Minutes- OT 23 minute(s)  -      OT Received On 01/31/25  -      OT - Next Appointment 02/03/25  -                User Key  (r) = Recorded By, (t) = Taken By, (c) = Cosigned By      Initials Name Provider Type    Trang Poole OT Occupational Therapist                  Therapy Charges for Today       Code Description Service Date Service Provider Modifiers Qty     [id number]  OT THER PROC EA 15 MIN 1/31/2025 Trang Lee, MEHDI GO 2                 Trang Lee OT  1/31/2025

## 2025-02-01 PROBLEM — E44.0 MODERATE PROTEIN-CALORIE MALNUTRITION: Status: ACTIVE | Noted: 2025-02-01

## 2025-02-01 LAB
ANION GAP SERPL CALCULATED.3IONS-SCNC: 8.3 MMOL/L (ref 5–15)
BUN SERPL-MCNC: 15 MG/DL (ref 8–23)
BUN/CREAT SERPL: 16.7 (ref 7–25)
CALCIUM SPEC-SCNC: 9.1 MG/DL (ref 8.6–10.5)
CHLORIDE SERPL-SCNC: 104 MMOL/L (ref 98–107)
CO2 SERPL-SCNC: 26.7 MMOL/L (ref 22–29)
CREAT SERPL-MCNC: 0.9 MG/DL (ref 0.76–1.27)
DEPRECATED RDW RBC AUTO: 49.6 FL (ref 37–54)
EGFRCR SERPLBLD CKD-EPI 2021: 86.3 ML/MIN/1.73
ERYTHROCYTE [DISTWIDTH] IN BLOOD BY AUTOMATED COUNT: 21.9 % (ref 12.3–15.4)
GLUCOSE SERPL-MCNC: 108 MG/DL (ref 65–99)
HCT VFR BLD AUTO: 46.8 % (ref 37.5–51)
HGB BLD-MCNC: 13.4 G/DL (ref 13–17.7)
MCH RBC QN AUTO: 20.2 PG (ref 26.6–33)
MCHC RBC AUTO-ENTMCNC: 28.6 G/DL (ref 31.5–35.7)
MCV RBC AUTO: 70.4 FL (ref 79–97)
PLATELET # BLD AUTO: 436 10*3/MM3 (ref 140–450)
PMV BLD AUTO: 9.7 FL (ref 6–12)
POTASSIUM SERPL-SCNC: 3.7 MMOL/L (ref 3.5–5.2)
RBC # BLD AUTO: 6.65 10*6/MM3 (ref 4.14–5.8)
SODIUM SERPL-SCNC: 139 MMOL/L (ref 136–145)
WBC NRBC COR # BLD AUTO: 19.64 10*3/MM3 (ref 3.4–10.8)

## 2025-02-01 PROCEDURE — 80048 BASIC METABOLIC PNL TOTAL CA: CPT | Performed by: STUDENT IN AN ORGANIZED HEALTH CARE EDUCATION/TRAINING PROGRAM

## 2025-02-01 PROCEDURE — 85027 COMPLETE CBC AUTOMATED: CPT | Performed by: STUDENT IN AN ORGANIZED HEALTH CARE EDUCATION/TRAINING PROGRAM

## 2025-02-01 RX ADMIN — PANTOPRAZOLE SODIUM 40 MG: 40 TABLET, DELAYED RELEASE ORAL at 05:48

## 2025-02-01 RX ADMIN — CARBIDOPA AND LEVODOPA 1 TABLET: 25; 250 TABLET ORAL at 11:36

## 2025-02-01 RX ADMIN — LISINOPRIL 5 MG: 5 TABLET ORAL at 08:51

## 2025-02-01 RX ADMIN — CARBIDOPA AND LEVODOPA 1 TABLET: 25; 250 TABLET ORAL at 08:51

## 2025-02-01 RX ADMIN — ENTACAPONE 200 MG: 200 TABLET, FILM COATED ORAL at 16:55

## 2025-02-01 RX ADMIN — ENTACAPONE 200 MG: 200 TABLET, FILM COATED ORAL at 08:51

## 2025-02-01 RX ADMIN — CARBIDOPA AND LEVODOPA 1 TABLET: 25; 250 TABLET ORAL at 20:38

## 2025-02-01 RX ADMIN — ALLOPURINOL 300 MG: 300 TABLET ORAL at 08:51

## 2025-02-01 RX ADMIN — ENTACAPONE 200 MG: 200 TABLET, FILM COATED ORAL at 11:36

## 2025-02-01 RX ADMIN — APIXABAN 2.5 MG: 2.5 TABLET, FILM COATED ORAL at 08:51

## 2025-02-01 RX ADMIN — HYDROXYUREA 500 MG: 500 CAPSULE ORAL at 09:32

## 2025-02-01 RX ADMIN — APIXABAN 2.5 MG: 2.5 TABLET, FILM COATED ORAL at 20:38

## 2025-02-01 RX ADMIN — ENTACAPONE 200 MG: 200 TABLET, FILM COATED ORAL at 20:38

## 2025-02-01 RX ADMIN — HYDROCODONE BITARTRATE AND ACETAMINOPHEN 1 TABLET: 5; 325 TABLET ORAL at 11:36

## 2025-02-01 RX ADMIN — ROSUVASTATIN CALCIUM 20 MG: 20 TABLET, FILM COATED ORAL at 20:38

## 2025-02-01 RX ADMIN — CARBIDOPA AND LEVODOPA 1 TABLET: 25; 250 TABLET ORAL at 16:55

## 2025-02-01 NOTE — PLAN OF CARE
Goal Outcome Evaluation:  Plan of Care Reviewed With: patient        Progress: improving      Pt alert and oriented, calm and cooperative, forgetful at times. Takes pills crushed in apple sauce. Bolus feedings given per order. Pt able to eat half of each meal this shift. Assist x1, with a walker, to the bathroom. Up in the chair. Incontinent, brief on. Family visited this afternoon. Plan of care ongoing.        Problem: Adult Inpatient Plan of Care  Goal: Absence of Hospital-Acquired Illness or Injury  Intervention: Identify and Manage Fall Risk  Recent Flowsheet Documentation  Taken 1/31/2025 1830 by Shiela Rojas RN  Safety Promotion/Fall Prevention:   clutter free environment maintained   assistive device/personal items within reach   fall prevention program maintained   nonskid shoes/slippers when out of bed   room organization consistent   safety round/check completed  Taken 1/31/2025 1600 by Shiela Rojas RN  Safety Promotion/Fall Prevention:   assistive device/personal items within reach   clutter free environment maintained   fall prevention program maintained   nonskid shoes/slippers when out of bed   room organization consistent   safety round/check completed  Taken 1/31/2025 1330 by Shiela Rojas RN  Safety Promotion/Fall Prevention:   assistive device/personal items within reach   clutter free environment maintained   fall prevention program maintained   nonskid shoes/slippers when out of bed   room organization consistent   safety round/check completed  Taken 1/31/2025 1200 by Shiela Rojas RN  Safety Promotion/Fall Prevention:   assistive device/personal items within reach   clutter free environment maintained   fall prevention program maintained   nonskid shoes/slippers when out of bed   room organization consistent   safety round/check completed  Taken 1/31/2025 1000 by Shiela Rojas RN  Safety Promotion/Fall Prevention:   assistive device/personal items within reach   clutter  free environment maintained   fall prevention program maintained   nonskid shoes/slippers when out of bed   room organization consistent   safety round/check completed  Taken 1/31/2025 0930 by Shiela Rojas RN  Safety Promotion/Fall Prevention:   assistive device/personal items within reach   clutter free environment maintained   fall prevention program maintained   nonskid shoes/slippers when out of bed   room organization consistent   safety round/check completed  Taken 1/31/2025 0800 by Shiela Rojas RN  Safety Promotion/Fall Prevention:   assistive device/personal items within reach   clutter free environment maintained   fall prevention program maintained   nonskid shoes/slippers when out of bed   room organization consistent   safety round/check completed     Problem: Adult Inpatient Plan of Care  Goal: Optimal Comfort and Wellbeing  Outcome: Progressing  Intervention: Monitor Pain and Promote Comfort  Recent Flowsheet Documentation  Taken 1/31/2025 0930 by Shiela Rojas RN  Pain Management Interventions: pain management plan reviewed with patient/caregiver  Intervention: Provide Person-Centered Care  Recent Flowsheet Documentation  Taken 1/31/2025 0930 by Shiela Rojas RN  Trust Relationship/Rapport:   care explained   choices provided   questions answered   questions encouraged   reassurance provided   thoughts/feelings acknowledged

## 2025-02-01 NOTE — PLAN OF CARE
Problem: Adult Inpatient Plan of Care  Goal: Optimal Comfort and Wellbeing  Outcome: Progressing  Intervention: Provide Person-Centered Care  Recent Flowsheet Documentation  Taken 1/31/2025 2057 by Monique Cohen RN  Trust Relationship/Rapport:   care explained   choices provided   emotional support provided   empathic listening provided   questions answered   questions encouraged   reassurance provided   thoughts/feelings acknowledged     Problem: Adult Inpatient Plan of Care  Goal: Readiness for Transition of Care  Outcome: Progressing   Goal Outcome Evaluation:           Progress: improving  Outcome Evaluation: pt resting comfortably overnight. VSS. pt stated looking forward to getting out of hospital

## 2025-02-01 NOTE — PLAN OF CARE
Goal Outcome Evaluation:      Patient AOx4, forgetful. Takes medications in applesauce. Bolus tube feeds administered through g-tube per order- tolerating well. Up in chair and ambulating to bathroom this shift. VSS.

## 2025-02-01 NOTE — PROGRESS NOTES
Name: Madhu Santoro ADMIT: 2025   : 1945  PCP: Damian Sanchez MD    MRN: [id number] LOS: 3 days   AGE/SEX: 79 y.o. male  ROOM: [facility]     Subjective   Subjective   Resting in his bed, no apparent distress. Hopeful to discharge tomorrow to rehab facility. D/w RN.        Objective   Objective   Vital Signs  Temp:  [97.1 °F (36.2 °C)-97.9 °F (36.6 °C)] 97.9 °F (36.6 °C)  Heart Rate:  [51-68] 68  Resp:  [18] 18  BP: (144-178)/(61-78) 178/64  SpO2:  [95 %-97 %] 96 %  on   ;   Device (Oxygen Therapy): room air  Body mass index is 21.12 kg/m².  Physical Exam  Constitutional:       General: He is not in acute distress.     Appearance: He is ill-appearing. He is not toxic-appearing.   Cardiovascular:      Rate and Rhythm: Normal rate and regular rhythm.   Pulmonary:      Effort: Pulmonary effort is normal. No respiratory distress.      Breath sounds: Normal breath sounds. No wheezing.   Abdominal:      General: Abdomen is flat.      Palpations: Abdomen is soft.      Tenderness: There is no abdominal tenderness.   Musculoskeletal:         General: No tenderness.      Right lower leg: No edema.      Left lower leg: No edema.   Skin:     General: Skin is warm and dry.   Neurological:      Mental Status: He is alert and oriented to person, place, and time. Mental status is at baseline.      Motor: Weakness present.      Comments: 5/5 UE, 4/5 LE; rigidity of RUE; bradykinesis         Results Review     I reviewed the patient's new clinical results.  Results from last 7 days   Lab Units 25  0654 25  1030 25  0524 25  0648   WBC 10*3/mm3 19.64* 18.87* 19.06* 26.40*   HEMOGLOBIN g/dL 13.4 13.7 12.8* 13.8   PLATELETS 10*3/mm3 436 544* 588* 625*     Results from last 7 days   Lab Units 25  0654 25  1030 25  0524 25  0648   SODIUM mmol/L 139 142 141 138   POTASSIUM mmol/L 3.7 3.8 3.8 4.7   CHLORIDE mmol/L 104 108* 108* 106   CO2 mmol/L 26.7 25.2 24.6 23.0   BUN mg/dL  "15 17 17 16   CREATININE mg/dL 0.90 0.97 1.00 1.05   GLUCOSE mg/dL 108* 144* 98 95   Estimated Creatinine Clearance: 58.3 mL/min (by C-G formula based on SCr of 0.9 mg/dL).  Results from last 7 days   Lab Units 01/28/25  1327   ALBUMIN g/dL 4.1   BILIRUBIN mg/dL 0.9   ALK PHOS U/L 150*   AST (SGOT) U/L 26   ALT (SGPT) U/L 8     Results from last 7 days   Lab Units 02/01/25  0654 01/31/25  1030 01/30/25  0524 01/29/25  0648 01/28/25  1327   CALCIUM mg/dL 9.1 9.2 8.9 9.3 9.9   ALBUMIN g/dL  --   --   --   --  4.1   MAGNESIUM mg/dL  --   --   --   --  2.3       COVID19   Date Value Ref Range Status   01/28/2025 Not Detected Not Detected - Ref. Range Final   01/07/2024 Not Detected Not Detected - Ref. Range Final     No results found for: \"HGBA1C\", \"POCGLU\"    XR Chest 1 View  Narrative: XR CHEST 1 VW-     HISTORY: Male who is 79 years-old, weakness     TECHNIQUE: Frontal view of the chest     COMPARISON: 3/9/2024     FINDINGS: The heart size is normal. Aorta is calcified. Pulmonary  vasculature is unremarkable. Old granulomatous disease is apparent.  Sternotomy wires are noted. No focal pulmonary consolidation, pleural  effusion, or pneumothorax. No acute osseous process.     Impression: No focal pulmonary consolidation. Follow-up as clinical  indications persist.     This report was finalized on 1/28/2025 1:45 PM by Dr. Mukesh Bruce M.D on Workstation: [id number]       Scheduled Medications  allopurinol, 300 mg, Oral, Daily  apixaban, 2.5 mg, Oral, Q12H  carbidopa-levodopa, 1 tablet, Oral, 4x Daily  entacapone, 200 mg, Oral, 4x Daily  hydroxyurea, 500 mg, Oral, Daily  lisinopril, 5 mg, Oral, Daily  pantoprazole, 40 mg, Oral, Q AM  rosuvastatin, 20 mg, Oral, Nightly    Infusions     Diet  Diet: Regular/House; No Mixed Consistencies; Texture: Soft to Chew (NDD 3); Soft to Chew: Ground Meat; Fluid Consistency: Nectar Thick         I have personally reviewed:  [x]  Laboratory   []  Microbiology   []  Radiology   [x]  " EKG/Telemetry   []  Cardiology/Vascular   []  Pathology   []  Records    Assessment/Plan     Active Hospital Problems    Diagnosis  POA    **General weakness [R53.1]  Yes    Moderate protein-calorie malnutrition [E44.0]  Yes    Generalized weakness [R53.1]  Yes    History of CVA (cerebrovascular accident) [Z86.73]  Not Applicable    Gout [M10.9]  Yes    Polycythemia vera [D45]  Yes    Polycythemia [D75.1]  Yes    PAD (peripheral artery disease) [I73.9]  Yes    S/P CABG (coronary artery bypass graft) [Z95.1]  Not Applicable    Benign essential hypertension [I10]  Yes    DDD (degenerative disc disease), lumbosacral [M51.379]  Yes    Anxiety [F41.9]  Yes    HLD (hyperlipidemia) [E78.5]  Yes      Resolved Hospital Problems   No resolved problems to display.       79 y.o. male admitted with General weakness.    Weakness, progressive  Parkinson's disease  Peripheral neuropathy  - d/w neurology- patient likely being undertreated for Parkinson's- increased dose recommended- patient's sinemet has been increased to QID on 1/29; Comtan added on 1/30- monitor for clinical response  - TSH wnl; B12 ok    CAD s/p CABG  Atrial flutter s/p ablation, on Eliquis   HTN  HLD  - cont home Eliquis, lisinopril    Hx of CVA  Carotid vascular disease  - cont home statin/eliquis    GERD  Dysphagia  - PPI  - complained of difficulty swallowing to RN- SLP to evaluate  - modified diet in place; speech therapy gave patient exercises to perform    Polycythemia vera/JAK2 mutation/essential thrombocythemia   - f/w CBC group; counts elevated but stable  - on hydrea    Lumbar degenerative disc disease    Gout  - allopurinol    SCDs for DVT prophylaxis.  DNR.  Discussed with patient and nursing staff.  Anticipated discharge to SNF tomorrow        Jewell Rios MD  [facility]  02/01/25  11:32 EST    I wore protective equipment throughout this patient encounter including gloves.  Hand hygiene was performed before donning  protective equipment and after removal when leaving the room.         Copied text in this note has been reviewed and is accurate as of 02/01/25.

## 2025-02-02 ENCOUNTER — READMISSION MANAGEMENT (OUTPATIENT)
Dept: CALL CENTER | Facility: HOSPITAL | Age: 80
End: 2025-02-02
Payer: MEDICARE

## 2025-02-02 VITALS
WEIGHT: 138.89 LBS | RESPIRATION RATE: 18 BRPM | BODY MASS INDEX: 21.05 KG/M2 | HEIGHT: 68 IN | OXYGEN SATURATION: 96 % | SYSTOLIC BLOOD PRESSURE: 144 MMHG | DIASTOLIC BLOOD PRESSURE: 66 MMHG | TEMPERATURE: 98.1 F | HEART RATE: 62 BPM

## 2025-02-02 LAB
ANION GAP SERPL CALCULATED.3IONS-SCNC: 8.9 MMOL/L (ref 5–15)
BUN SERPL-MCNC: 20 MG/DL (ref 8–23)
BUN/CREAT SERPL: 22.2 (ref 7–25)
CALCIUM SPEC-SCNC: 9.3 MG/DL (ref 8.6–10.5)
CHLORIDE SERPL-SCNC: 104 MMOL/L (ref 98–107)
CO2 SERPL-SCNC: 29.1 MMOL/L (ref 22–29)
CREAT SERPL-MCNC: 0.9 MG/DL (ref 0.76–1.27)
DEPRECATED RDW RBC AUTO: 55.1 FL (ref 37–54)
EGFRCR SERPLBLD CKD-EPI 2021: 86.3 ML/MIN/1.73
ERYTHROCYTE [DISTWIDTH] IN BLOOD BY AUTOMATED COUNT: 23.1 % (ref 12.3–15.4)
GLUCOSE SERPL-MCNC: 92 MG/DL (ref 65–99)
HCT VFR BLD AUTO: 49.3 % (ref 37.5–51)
HGB BLD-MCNC: 13.5 G/DL (ref 13–17.7)
MCH RBC QN AUTO: 20.1 PG (ref 26.6–33)
MCHC RBC AUTO-ENTMCNC: 27.4 G/DL (ref 31.5–35.7)
MCV RBC AUTO: 73.3 FL (ref 79–97)
PLATELET # BLD AUTO: 398 10*3/MM3 (ref 140–450)
PMV BLD AUTO: 9.6 FL (ref 6–12)
POTASSIUM SERPL-SCNC: 3.7 MMOL/L (ref 3.5–5.2)
RBC # BLD AUTO: 6.73 10*6/MM3 (ref 4.14–5.8)
SODIUM SERPL-SCNC: 142 MMOL/L (ref 136–145)
WBC NRBC COR # BLD AUTO: 19.56 10*3/MM3 (ref 3.4–10.8)

## 2025-02-02 PROCEDURE — 25010000002 ONDANSETRON PER 1 MG: Performed by: INTERNAL MEDICINE

## 2025-02-02 PROCEDURE — 85027 COMPLETE CBC AUTOMATED: CPT | Performed by: STUDENT IN AN ORGANIZED HEALTH CARE EDUCATION/TRAINING PROGRAM

## 2025-02-02 PROCEDURE — 80048 BASIC METABOLIC PNL TOTAL CA: CPT | Performed by: STUDENT IN AN ORGANIZED HEALTH CARE EDUCATION/TRAINING PROGRAM

## 2025-02-02 RX ORDER — CARBIDOPA/LEVODOPA 25MG-250MG
1 TABLET ORAL 4 TIMES DAILY
Start: 2025-02-02

## 2025-02-02 RX ORDER — ENTACAPONE 200 MG/1
200 TABLET ORAL 4 TIMES DAILY
Start: 2025-02-02

## 2025-02-02 RX ORDER — HYDROCODONE BITARTRATE AND ACETAMINOPHEN 5; 325 MG/1; MG/1
1 TABLET ORAL 3 TIMES DAILY PRN
Qty: 9 TABLET | Refills: 0 | Status: SHIPPED | OUTPATIENT
Start: 2025-02-02

## 2025-02-02 RX ADMIN — LISINOPRIL 5 MG: 5 TABLET ORAL at 08:24

## 2025-02-02 RX ADMIN — CARBIDOPA AND LEVODOPA 1 TABLET: 25; 250 TABLET ORAL at 12:14

## 2025-02-02 RX ADMIN — APIXABAN 2.5 MG: 2.5 TABLET, FILM COATED ORAL at 08:24

## 2025-02-02 RX ADMIN — HYDROXYUREA 500 MG: 500 CAPSULE ORAL at 08:25

## 2025-02-02 RX ADMIN — CARBIDOPA AND LEVODOPA 1 TABLET: 25; 250 TABLET ORAL at 08:24

## 2025-02-02 RX ADMIN — ONDANSETRON 4 MG: 2 INJECTION, SOLUTION INTRAMUSCULAR; INTRAVENOUS at 09:37

## 2025-02-02 RX ADMIN — ENTACAPONE 200 MG: 200 TABLET, FILM COATED ORAL at 12:14

## 2025-02-02 RX ADMIN — PANTOPRAZOLE SODIUM 40 MG: 40 TABLET, DELAYED RELEASE ORAL at 04:57

## 2025-02-02 RX ADMIN — ENTACAPONE 200 MG: 200 TABLET, FILM COATED ORAL at 08:24

## 2025-02-02 RX ADMIN — ALLOPURINOL 300 MG: 300 TABLET ORAL at 08:24

## 2025-02-02 NOTE — DISCHARGE SUMMARY
Patient Name: Madhu Santoro  : 1945  MRN: [id number]    Date of Admission: 2025  Date of Discharge:  2025  Primary Care Physician: Damian Sanchez MD      Chief Complaint:   Weakness - Generalized and Fatigue      Discharge Diagnoses     Active Hospital Problems    Diagnosis  POA    **General weakness [R53.1]  Yes    Moderate protein-calorie malnutrition [E44.0]  Yes    Generalized weakness [R53.1]  Yes    History of CVA (cerebrovascular accident) [Z86.73]  Not Applicable    Gout [M10.9]  Yes    Polycythemia vera [D45]  Yes    Polycythemia [D75.1]  Yes    PAD (peripheral artery disease) [I73.9]  Yes    S/P CABG (coronary artery bypass graft) [Z95.1]  Not Applicable    Benign essential hypertension [I10]  Yes    DDD (degenerative disc disease), lumbosacral [M51.379]  Yes    Anxiety [F41.9]  Yes    HLD (hyperlipidemia) [E78.5]  Yes      Resolved Hospital Problems   No resolved problems to display.        Hospital Course     Mr. Santoro is a 79 y.o. male with a history of Parkinson's, CAD with history of CABG, atrial flutter, hypertension, hyperlipidemia, history of stroke and carotid disease, GERD, dysphagia, polycythemia of area and essential thrombocythemia, gout who presented to [facility] initially complaining of weakness and difficulty walking.  Please see the admitting history and physical for further details.  He was admitted to the hospital for further evaluation and treatment.  Neurology consulted on admission for increasing weakness and difficulty walking in the setting of Parkinson's disease.  Neurology believes patient was being underdosed on his current Parkinson's medications and recommended increasing his Sinemet to 4 times a day and the addition of entacapone.  Patient will need to continue these new/increased medications and follow-up with his neurologist as an outpatient.  He was able to work with physical therapy and it was determined that he would benefit from  discharging to rehab for additional strengthening prior to returning home.  He should also follow-up with his PCP in a week for posthospital follow-up visit.    Day of Discharge     Subjective:  Resting in bed, no apparent distress.     Physical Exam:  Temp:  [97.4 °F (36.3 °C)-98.1 °F (36.7 °C)] 98.1 °F (36.7 °C)  Heart Rate:  [54-74] 54  Resp:  [18] 18  BP: (144-186)/(54-70) 170/70  Body mass index is 21.12 kg/m².  Physical Exam  Constitutional:       General: He is not in acute distress.     Appearance: He is ill-appearing. He is not toxic-appearing.   Cardiovascular:      Rate and Rhythm: Normal rate and regular rhythm.   Pulmonary:      Effort: Pulmonary effort is normal. No respiratory distress.      Breath sounds: Normal breath sounds. No wheezing.   Abdominal:      General: Abdomen is flat.      Palpations: Abdomen is soft.      Tenderness: There is no abdominal tenderness.   Musculoskeletal:         General: No tenderness.      Right lower leg: No edema.      Left lower leg: No edema.   Skin:     General: Skin is warm and dry.   Neurological:      Mental Status: He is alert and oriented to person, place, and time. Mental status is at baseline.      Motor: Weakness present.      Comments: 5/5 UE, 4/5 LE; rigidity of RUE; bradykinesis      Consultants     Consult Orders (all) (From admission, onward)       Start     Ordered    01/29/25 1134  Inpatient Nutrition Consult  Once        Comments: Patient states he's on tube feeds at home. States he also tries to 'eat what he can'   Provider:  (Not yet assigned)    01/29/25 1133    01/29/25 0938  Inpatient Neurology Consult General  Once        Specialty:  Neurology  Provider:  Cira Gomez MD    01/29/25 0937    01/28/25 1749  [facility] (on-call MD unless specified) Details  Once        Specialty:  Hospitalist  Provider:  (Not yet assigned)    01/28/25 1748                  Procedures     Imaging Results (All)       Procedure Component Value Units Date/Time    XR  Chest 1 View [[id number]] Collected: 01/28/25 1344     Updated: 01/28/25 1348    Narrative:      XR CHEST 1 VW-     HISTORY: Male who is 79 years-old, weakness     TECHNIQUE: Frontal view of the chest     COMPARISON: 3/9/2024     FINDINGS: The heart size is normal. Aorta is calcified. Pulmonary  vasculature is unremarkable. Old granulomatous disease is apparent.  Sternotomy wires are noted. No focal pulmonary consolidation, pleural  effusion, or pneumothorax. No acute osseous process.       Impression:      No focal pulmonary consolidation. Follow-up as clinical  indications persist.     This report was finalized on 1/28/2025 1:45 PM by Dr. Mukesh Bruce M.D on Workstation: [id number]               Pertinent Labs     Results from last 7 days   Lab Units 02/02/25  0642 02/01/25  0654 01/31/25  1030 01/30/25  0524   WBC 10*3/mm3 19.56* 19.64* 18.87* 19.06*   HEMOGLOBIN g/dL 13.5 13.4 13.7 12.8*   PLATELETS 10*3/mm3 398 436 544* 588*     Results from last 7 days   Lab Units 02/02/25  0642 02/01/25  0654 01/31/25  1030 01/30/25  0524   SODIUM mmol/L 142 139 142 141   POTASSIUM mmol/L 3.7 3.7 3.8 3.8   CHLORIDE mmol/L 104 104 108* 108*   CO2 mmol/L 29.1* 26.7 25.2 24.6   BUN mg/dL 20 15 17 17   CREATININE mg/dL 0.90 0.90 0.97 1.00   GLUCOSE mg/dL 92 108* 144* 98   Estimated Creatinine Clearance: 58.3 mL/min (by C-G formula based on SCr of 0.9 mg/dL).  Results from last 7 days   Lab Units 01/28/25  1327   ALBUMIN g/dL 4.1   BILIRUBIN mg/dL 0.9   ALK PHOS U/L 150*   AST (SGOT) U/L 26   ALT (SGPT) U/L 8     Results from last 7 days   Lab Units 02/02/25  0642 02/01/25  0654 01/31/25  1030 01/30/25  0524 01/29/25  0648 01/28/25  1327   CALCIUM mg/dL 9.3 9.1 9.2 8.9   < > 9.9   ALBUMIN g/dL  --   --   --   --   --  4.1   MAGNESIUM mg/dL  --   --   --   --   --  2.3    < > = values in this interval not displayed.       Results from last 7 days   Lab Units 01/28/25  1432 01/28/25  1327   HSTROP T ng/L 38* 40*   PROBNP pg/mL   "--  265.0           Invalid input(s): \"LDLCALC\"        Test Results Pending at Discharge       Discharge Details        Discharge Medications        New Medications        Instructions Start Date   entacapone 200 MG tablet  Commonly known as: COMTAN   200 mg, Oral, 4 Times Daily             Changes to Medications        Instructions Start Date   carbidopa-levodopa  MG per tablet  Commonly known as: Sinemet  What changed: when to take this   1 tablet, Oral, 4 Times Daily             Continue These Medications        Instructions Start Date   allopurinol 300 MG tablet  Commonly known as: ZYLOPRIM   300 mg, Oral, Daily      apixaban 2.5 MG tablet tablet  Commonly known as: ELIQUIS   2.5 mg, Oral, Every 12 Hours Scheduled      HYDROcodone-acetaminophen 5-325 MG per tablet  Commonly known as: NORCO   1 tablet, Oral, 3 Times Daily PRN, 30 day supply.      hydroxyurea 500 MG capsule  Commonly known as: Hydrea   500 mg, Oral, Daily      lisinopril 5 MG tablet  Commonly known as: PRINIVIL,ZESTRIL   5 mg, Oral, Daily      OSMOLITE 1.5 GIANNI PO   240 Units/oz/day, 6 Times Daily      RABEprazole 20 MG EC tablet  Commonly known as: ACIPHEX   20 mg, Oral, Daily      rosuvastatin 20 MG tablet  Commonly known as: Crestor   20 mg, Oral, Nightly             Stop These Medications      cephalexin 500 MG capsule  Commonly known as: KEFLEX     gabapentin 100 MG capsule  Commonly known as: NEURONTIN              Allergies   Allergen Reactions    Atorvastatin Myalgia     Myalgia    Penicillins Hives, Swelling and Rash     Tolerates cephalosporins          Discharge Disposition:  Home or Self Care    Discharge Diet:  Diet Order   Procedures    Diet: Regular/House; No Mixed Consistencies; Texture: Soft to Chew (NDD 3); Soft to Chew: Ground Meat; Fluid Consistency: Nectar Thick       Discharge Activity:   Activity Instructions       Activity as Tolerated              CODE STATUS:    Code Status and Medical Interventions: No CPR (Do Not " Attempt to Resuscitate); Limited Support; No intubation (DNI)   Ordered at: 01/28/25 1842     Medical Intervention Limits:    No intubation (DNI)     Code Status (Patient has no pulse and is not breathing):    No CPR (Do Not Attempt to Resuscitate)     Medical Interventions (Patient has pulse or is breathing):    Limited Support       Future Appointments   Date Time Provider Department Center   2/18/2025  1:00 PM LAB CHAIR 4 [facility]  [facility]   2/18/2025  1:20 PM Faith Alcocer APRN [facility]   3/20/2025  1:00 PM Mukesh Russell II, MD [facility]   8/12/2025  2:00 PM Massimo Jordan MD [facility]     Additional Instructions for the Follow-ups that You Need to Schedule       Discharge Follow-up with PCP   As directed       Currently Documented PCP:    Damian Sanchez MD    PCP Phone Number:    [phone]     Follow Up Details: post-hospital follow up        Discharge Follow-up with Specified Provider: Neurology as directed   As directed      To: Neurology as directed               Contact information for follow-up providers       Damian Sanchez MD .    Specialty: Family Medicine  Why: post-hospital follow up  Contact information:  [address] [address] [id number] [phone]                       Contact information for after-discharge care       Destination       [facility] .    Service: Skilled Nursing  Contact information:  [address] [facility] [address] [phone]                                   Additional Instructions for the Follow-ups that You Need to Schedule       Discharge Follow-up with PCP   As directed       Currently Documented PCP:    Damian Sanchez MD    PCP Phone Number:    [phone]     Follow Up Details: post-hospital follow up        Discharge Follow-up with Specified Provider: Neurology as directed   As directed      To: Neurology as directed            Time Spent on Discharge:  I spent  greater than 30 minutes on this discharge activity which included: face-to-face encounter with the patient, reviewing the data in the system, coordination of the care with the nursing staff as well as consultants, documentation, and entering orders.       Jewell Rios MD  [facility]  02/02/25  11:03 EST

## 2025-02-02 NOTE — NURSING NOTE
1405 RN called [facility] and gave report to Libby SNYDER. Pt's spouse, Brooke Santoro, will be picking up pt at 1500 to drive him to the facility mentioned. RN packed 7 cartoon of Nutren 2.0 for discharge.

## 2025-02-02 NOTE — PLAN OF CARE
Goal Outcome Evaluation:         Paient resting in bed alert and oriented, on room air, gtube clamped following HS feeding. Up w/assist, unsteady on feet, meds crushed in applesauce. Pt experienced 1 episode of vomiting this shift. No further c/o nausea or vomiting noted. Plan of care is ongoing.

## 2025-02-02 NOTE — CASE MANAGEMENT/SOCIAL WORK
Continued Stay Note  [facility]     Patient Name: Madhu Santoro  MRN: [id number]  Today's Date: 2/2/2025    Admit Date: 1/28/2025    Plan: Skilled care bed at [facility] via private auto; pt to be sent with 1-2 days of tube feeds   Discharge Plan       Row Name 02/02/25 1224       Plan    Plan Skilled care bed at [facility] via private auto; pt to be sent with 1-2 days of tube feeds    Patient/Family in Agreement with Plan yes    Plan Comments Inbound call from staff RN stating pt has DC order, plans are for return to [facility], and is bed available. Message to [facility] to check on bed status. Return message from [facility], and yes, pt has a bed available at [facility]. Pt will need to DC with 1-2 days of tube feeds. Family to drive pt to [facility].......JW                   Discharge Codes    No documentation.                 Expected Discharge Date and Time       Expected Discharge Date Expected Discharge Time    Feb 2, 2025               Marta Singh, RN

## 2025-02-03 NOTE — OUTREACH NOTE
Prep Survey      Flowsheet Row Responses   [facility] facility patient discharged from? [facility]   Is LACE score < 7 ? No   Eligibility Not Eligible   What are the reasons patient is not eligible? [facility]  [[facility]]   Does the patient have one of the following disease processes/diagnoses(primary or secondary)? Other   Prep survey completed? Yes            JANICE NUNEZ - Registered Nurse

## 2025-02-06 DIAGNOSIS — M10.9 GOUT, UNSPECIFIED CAUSE, UNSPECIFIED CHRONICITY, UNSPECIFIED SITE: Chronic | ICD-10-CM

## 2025-02-06 RX ORDER — ALLOPURINOL 300 MG/1
300 TABLET ORAL DAILY
Qty: 30 TABLET | Refills: 0 | Status: SHIPPED | OUTPATIENT
Start: 2025-02-06

## 2025-02-17 ENCOUNTER — TELEPHONE (OUTPATIENT)
Dept: FAMILY MEDICINE CLINIC | Facility: CLINIC | Age: 80
End: 2025-02-17

## 2025-02-17 RX ORDER — DOXYCYCLINE HYCLATE 100 MG
100 TABLET ORAL 2 TIMES DAILY
Qty: 20 TABLET | Refills: 0 | Status: SHIPPED | OUTPATIENT
Start: 2025-02-17

## 2025-02-17 NOTE — TELEPHONE ENCOUNTER
Caller: Brooke Santoro ([facility])    Relationship: Emergency Contact    Best call back number: [phone]     What medication are you requesting: UTI MEDICATION    What are your current symptoms: BURNING WITH URINATION    How long have you been experiencing symptoms: 2-3 WEEKS    Have you had these symptoms before:    [] Yes  [x] No    Have you been treated for these symptoms before:   [] Yes  [x] No    If a prescription is needed, what is your preferred pharmacy and phone number: [facility] - [place], [place] - [address] - [phone] [facility] [phone] FX     Additional notes: PATIENT WAS RELEASED FROM [facility] ON 02/15/25. A URINALYSIS WAS DONE, BUT THERE WAS NO DOCTOR THERE TO READ RESULTS AND PRESCRIBE MEDICATION. [facility] PROCESSED THE URINALYSIS AND  HAS THE RESULTS. IT DOES SHOW ABNORMALITIES. IF YOU COULD CALL [phone] TO GET THE FULL RESULTS. WIFE STATES SHE AND PATIENT ARE TOO WEAK TO COME IN FOR AN APPOINTMENT

## 2025-02-17 NOTE — TELEPHONE ENCOUNTER
PT WAS TOLD -  Patient has been treated by me for prostatitis before.  Let them know I sent an antibiotic for him.

## 2025-02-18 ENCOUNTER — OFFICE VISIT (OUTPATIENT)
Dept: ONCOLOGY | Facility: CLINIC | Age: 80
End: 2025-02-18
Payer: MEDICARE

## 2025-02-18 ENCOUNTER — LAB (OUTPATIENT)
Dept: LAB | Facility: HOSPITAL | Age: 80
End: 2025-02-18
Payer: MEDICARE

## 2025-02-18 VITALS
DIASTOLIC BLOOD PRESSURE: 66 MMHG | HEIGHT: 68 IN | BODY MASS INDEX: 21.12 KG/M2 | OXYGEN SATURATION: 98 % | TEMPERATURE: 97.6 F | HEART RATE: 61 BPM | SYSTOLIC BLOOD PRESSURE: 149 MMHG

## 2025-02-18 DIAGNOSIS — M25.551 CHRONIC PAIN OF BOTH HIPS: ICD-10-CM

## 2025-02-18 DIAGNOSIS — D45 POLYCYTHEMIA VERA: Primary | ICD-10-CM

## 2025-02-18 DIAGNOSIS — M25.50 ARTHRALGIA OF MULTIPLE JOINTS: ICD-10-CM

## 2025-02-18 DIAGNOSIS — D45 POLYCYTHEMIA VERA: ICD-10-CM

## 2025-02-18 DIAGNOSIS — R26.2 DIFFICULTY WALKING: ICD-10-CM

## 2025-02-18 DIAGNOSIS — M51.379 DDD (DEGENERATIVE DISC DISEASE), LUMBOSACRAL: ICD-10-CM

## 2025-02-18 DIAGNOSIS — M25.552 CHRONIC PAIN OF BOTH HIPS: ICD-10-CM

## 2025-02-18 DIAGNOSIS — G89.29 CHRONIC PAIN OF BOTH HIPS: ICD-10-CM

## 2025-02-18 LAB
BASOPHILS # BLD AUTO: 0.1 10*3/MM3 (ref 0–0.2)
BASOPHILS NFR BLD AUTO: 0.5 % (ref 0–1.5)
DEPRECATED RDW RBC AUTO: 73.9 FL (ref 37–54)
EOSINOPHIL # BLD AUTO: 0.22 10*3/MM3 (ref 0–0.4)
EOSINOPHIL NFR BLD AUTO: 1.2 % (ref 0.3–6.2)
ERYTHROCYTE [DISTWIDTH] IN BLOOD BY AUTOMATED COUNT: 28.7 % (ref 12.3–15.4)
HCT VFR BLD AUTO: 55.5 % (ref 37.5–51)
HGB BLD-MCNC: 15.5 G/DL (ref 13–17.7)
IMM GRANULOCYTES # BLD AUTO: 0.16 10*3/MM3 (ref 0–0.05)
IMM GRANULOCYTES NFR BLD AUTO: 0.9 % (ref 0–0.5)
LYMPHOCYTES # BLD AUTO: 1.37 10*3/MM3 (ref 0.7–3.1)
LYMPHOCYTES NFR BLD AUTO: 7.5 % (ref 19.6–45.3)
MCH RBC QN AUTO: 21 PG (ref 26.6–33)
MCHC RBC AUTO-ENTMCNC: 27.9 G/DL (ref 31.5–35.7)
MCV RBC AUTO: 75.3 FL (ref 79–97)
MONOCYTES # BLD AUTO: 1.23 10*3/MM3 (ref 0.1–0.9)
MONOCYTES NFR BLD AUTO: 6.7 % (ref 5–12)
NEUTROPHILS NFR BLD AUTO: 15.24 10*3/MM3 (ref 1.7–7)
NEUTROPHILS NFR BLD AUTO: 83.2 % (ref 42.7–76)
NRBC BLD AUTO-RTO: 0 /100 WBC (ref 0–0.2)
PLATELET # BLD AUTO: 664 10*3/MM3 (ref 140–450)
PMV BLD AUTO: 10 FL (ref 6–12)
RBC # BLD AUTO: 7.37 10*6/MM3 (ref 4.14–5.8)
WBC NRBC COR # BLD AUTO: 18.32 10*3/MM3 (ref 3.4–10.8)

## 2025-02-18 PROCEDURE — 36415 COLL VENOUS BLD VENIPUNCTURE: CPT

## 2025-02-18 PROCEDURE — 85025 COMPLETE CBC W/AUTO DIFF WBC: CPT

## 2025-02-18 RX ORDER — HYDROXYUREA 500 MG/1
500 CAPSULE ORAL DAILY
Qty: 30 CAPSULE | Refills: 3 | Status: SHIPPED | OUTPATIENT
Start: 2025-02-18

## 2025-02-18 RX ORDER — HYDROCODONE BITARTRATE AND ACETAMINOPHEN 5; 325 MG/1; MG/1
1 TABLET ORAL EVERY 8 HOURS PRN
Qty: 90 TABLET | Refills: 0 | Status: SHIPPED | OUTPATIENT
Start: 2025-02-18

## 2025-02-18 NOTE — PROGRESS NOTES
REASON FOR FOLLOW-UP: Polycythemia vera, JAK2 mutation detected    HISTORY OF PRESENT ILLNESS:    He returns today for 1 month follow up since restarting hydrea. He was admitted from 1/28/2025-2/2/2025 for generalized weakness thought to be secondary to subtherapeutic sinemet for his parkinsons as well as a recent viral infection. He was discharged to a nursing home and was completing rehab there. He continues to be extremely weak and dizzy all the time. His spouse is working on getting him scheduled for outpatient PT starting in March. He denies chest pain or worsening shortness of breath. He continues to take hydrea 500mg daily and thinks he is tolerating this okay.       Hematologic/oncologic history:     The patient is a 78-year-old male followed with a number of issues including coronary artery disease, status post CABG, atrial flutter, previous CVA hyperlipidemia, GE reflux, carotid vascular disease, and hypertension.     He had been seen by vascular 2/25/2022 with mild progression of carotid disease but otherwise stable and also had follow-up with pain management for back pain 3/28/2022 requiring chronic narcotic therapy.  Patient has been resistant to epidural like procedures.     Unfortunately has had concurrent constipation requiring laxatives and additional opioid antagonist to reduce GI hypomotility.  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     Patient recently reviewed again by pain management with worsening pain.  Patient also saw GI periodically undergoing a follow-up MRI of the abdomen 8/9/2022 with scattered pancreatic cystic lesions unchanged from previous.  He had previously undergone EGD and colonoscopy 11/5/2021 with irregular Z-line and biopsies taken in nonbleeding internal hemorrhoids found during retroflexion that were small.   There are scattered small and large mouth diverticula found in the sigmoid colon descending colon and splenic flexure.       The patient now presents for thrombocytosis with review of his record over the last year demonstrating a platelet count that is escalated from 3-400,000 now to 8/11/2022 648,000 but concurrent microcytic and hypochromic indices.  These indices have been slowly reducing over the last 2 years approximately followed more closely.        These findings are discussed with the patient 8/16/2022 who indicates that he actually feels about the same though still has issues with back pain.  He is noted no change in bowel habit including, fortunately, improvement of his constipation without the use of additional medication such as Movantik.  He has not noted any change in the color of his stool including dark stools or any blood per rectum, urine though he does note periodic excess bruising from his anticoagulation therapy.       The patient moved to a trial of iron which, unfortunately, worsened his constipation in which he had discontinue. He had further issues in early September with left renal stone, requiring cystoscopy, stent placement 8/23/2022.       He is next seen back 9/28/2022 with repeat studies performed 9/21 demonstrating 5% iron saturation, ferritin of 12.5.  He remains further weight and fatigue, again oral iron intolerant and will require IV iron preparations for his iron deficiency anemia.      The patient is next seen 1/18/2023 with considerable improvement in his testing including H&H now 17.2 and 55.4 though with iron saturation of 8% and ferritin 26.5.  He has not noted any blood loss but remains generally weak and with ongoing periodic abdominal pain.  His major concerns continue to be a disequilibrium since his previous CVA symptoms.  He has not been seen in follow-up by neurology.  He continues to have hematuria by urinary assessment but not gross findings.  We have discussed  that he may actually have a myeloproliferative disorder and requested that he undergo an assessment for JAK2 analysis today.    He is seen back 4/17/2023JAK  2-V617 F mutation having been detected.  In the interval he was admitted 2/19-21/23 for weakness, fall and ER evaluation not demonstrate any acute intracranial process, CT of lumbar spine with lumbar degenerative disease and other studies not show any acute abnormalities.  Fortunately he went on to improve though his wife had a positive COVID test recently on home examination.  His follow-up testing 4/10/2023 include an H&H of 18.3 and 60.9 white count of 14,700 platelet count of 477,000.    He is seen 4/17/2023 and we discussed that he is better served with phlebotomy at this point.  He states he feels so poorly that he is quite willing to try to move to additional therapies including phlebotomy.    Patient initiating Hydrea 1000 mg daily as of 5/19/2023.    He is next seen back in office 6/30/2023.  He is gradually seen an improvement in his hemoglobin hematocrit dropping to a normal CBC approximately 6/30/2023 H&H of 14.1 and 46.6, white count of 4800 and platelet count of 146,000.  He is tolerating treatment well without additional side effects.    Patient seen 9/21/2023 with H&H 11.1 and 32.9, white count 5110, platelet count 151,000.  Patient without additional side effect from Hydrea though dose is now reduced to 500 mg every other day.    He was next seen in office 4/12/2024 after complications following COVID development late last year.  He has been hospitalized several times, and rehab several times and had evidence hemolysis leading to discontinuance of his Hydrea.  He was last seen in mid January stable hematologically.  Unfortunately we did not have additional recommendations as he tried to recover from the infection.        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  Hematologically, fortunately, he remained stable and is off Hydrea.    He is next seen 7/24/2024 reasonably stable hematologically.  At present no intervention is necessary.                                                                                                                             Past Medical History:   Diagnosis Date    Anxiety     Arthritis     Atrial flutter     Status post cavotricuspid isthmus ablation by Dr. Gustafson on 4/18/17    Mandel esophagus     Benign essential hypertension     CAD (coronary artery disease)     3 vessel CABG 4/11/17 by Dr. Cortez: ROSARIO-prox LAD, SVG-OM1, SVG-OM3    Carotid artery disease     Status post carotid endarterectomy - [facility] 4/10/17: 50-59% NICHELLE, 1-15% LICA.     Colonic polyp     COVID-19 long hauler     Cyst of pancreas     DDD (degenerative disc disease), lumbosacral     GERD (gastroesophageal reflux disease)     H/O bone density study 2013    H/O complete eye exam 2014    History of kidney stone     HLD (hyperlipidemia)     Hypertension     Kidney stone     8/22/22    Lipid screening 05/31/2013    Low back pain     physical therapy [facility] rehab 5-12-10    Parkinson disease     Screening for prostate cancer 07/07/2015    Skin cancer     nose    Stroke     RESIDUAL--\"BALANCE ISSUES\"        Past Surgical History:   Procedure Laterality Date    CARDIAC CATHETERIZATION N/A 04/10/2017    Procedure: Left Heart Cath;  Surgeon: Marjorie Healy MD;  Location:  [facility] CATH INVASIVE [facility]ATION;  Service:     CARDIAC CATHETERIZATION N/A 04/10/2017    Procedure: Coronary angiography;  Surgeon: Marjorie Healy MD;  Location:  [facility];  Service:     CARDIAC CATHETERIZATION N/A 04/10/2017    Procedure: Left ventriculography;  Surgeon: Marjorie Healy MD;  Location:  [facility];  Service:     CARDIAC CATHETERIZATION  2011    CARDIAC ELECTROPHYSIOLOGY PROCEDURE N/A " 04/18/2017    Procedure: Ablation atrial flutter;  Surgeon: Jose Antonio Gustafson MD;  Location: [facility] CATH INVASIVE LOCATION;  Service:     CAROTID ENDARTERECTOMY      CHOLECYSTECTOMY      CHOLECYSTECTOMY WITH INTRAOPERATIVE CHOLANGIOGRAM N/A 09/07/2019    Procedure: Laparoscopic cholecystectomy with intraoperative cholangiogram;  Surgeon: Gauri Travis MD;  Location: [facility] MAIN OR;  Service: General    COLONOSCOPY  01/06/2015    Diverticulosis, one TA    COLONOSCOPY N/A 02/14/2019    tics, NBIH, adenomatous polyp x 2    COLONOSCOPY N/A 11/05/2021    Procedure: COLONOSCOPY TO CECUM AND TERM. ILEUM WITH COLD POLYPECTOMIES;  Surgeon: Eevrton Abel MD;  Location: [facility] ENDOSCOPY;  Service: Gastroenterology;  Laterality: N/A;  PRE OP - PERS H/O POLYPS  POST OP - COLON POLYPS,, DIVERTICULOSIS, HEMORRHOIDS    CORONARY ARTERY BYPASS GRAFT N/A 04/11/2017    Procedure: AR STERNOTOMY CORONARY ARTERY BYPASS GRAFT TIMES 3 USING LEFT INTERNAL MAMMARY ARTERY AND LEFT GREATER SAPHENOUS VEIN GRAFT PER ENDOSCOPIC VEIN HARVESTING AND PRP ;  Surgeon: Temo Cortez MD;  Location: [facility] MAIN OR;  Service:     ENDOSCOPY  01/06/2015    HH, Ervin's esophagus    ENDOSCOPY N/A 02/14/2019    Z line irregular, HH, Ervin's esophagus    ENDOSCOPY N/A 11/05/2021    Procedure: ESOPHAGOGASTRODUODENOSCOPY WITH BIOPSIES;  Surgeon: Everton Abel MD;  Location: [facility] ENDOSCOPY;  Service: Gastroenterology;  Laterality: N/A;  PRE OP - PERS H/O ERVIN'S  POST OP - IRREG Z LINE    ENDOSCOPY W/ PEG TUBE PLACEMENT N/A 11/6/2023    Procedure: ESOPHAGOGASTRODUODENOSCOPY WITH PERCUTANEOUS ENDOSCOPIC GASTROSTOMY TUBE INSERTION;  Surgeon: Temo Ramsey MD;  Location: [facility] ENDOSCOPY;  Service: General;  Laterality: N/A;  Pre: dysphagia  Post: same    KNEE SURGERY Left     URETEROSCOPY LASER LITHOTRIPSY WITH STENT INSERTION Left 8/23/2022    Procedure: Cysto retrograde with left uretro stent placement;  Surgeon: Damien Oliveira  MD GINA;  Location: [facility] MAIN OR;  Service: Urology;  Laterality: Left;    VASECTOMY          Current Outpatient Medications on File Prior to Visit   Medication Sig Dispense Refill    allopurinol (ZYLOPRIM) 300 MG tablet TAKE 1 TABLET BY MOUTH DAILY 30 tablet 0    apixaban (ELIQUIS) 2.5 MG tablet tablet Take 1 tablet by mouth Every 12 (Twelve) Hours. Indications: Atrial Fibrillation      carbidopa-levodopa (Sinemet)  MG per tablet Take 1 tablet by mouth 4 (Four) Times a Day.      doxycycline (VIBRAMYICN) 100 MG tablet Take 1 tablet by mouth 2 (Two) Times a Day. 20 tablet 0    entacapone (COMTAN) 200 MG tablet Take 1 tablet by mouth 4 (Four) Times a Day.      HYDROcodone-acetaminophen (NORCO) 5-325 MG per tablet Take 1 tablet by mouth 3 (Three) Times a Day As Needed for Severe Pain or Moderate Pain. 30 day supply. 9 tablet 0    hydroxyurea (Hydrea) 500 MG capsule Take 1 capsule by mouth Daily. 30 capsule 1    lisinopril (PRINIVIL,ZESTRIL) 5 MG tablet Take 1 tablet by mouth Daily. 30 tablet 11    Nutritional Supplements (OSMOLITE 1.5 GIANNI PO) 240 Units/oz/day by Gastrostomy Tube route 6 (Six) Times a Day.      RABEprazole (ACIPHEX) 20 MG EC tablet TAKE 1 TABLET BY MOUTH DAILY 90 tablet 0    rosuvastatin (Crestor) 20 MG tablet Take 1 tablet by mouth Every Night. Indications: Cerebrovascular Accident or Stroke 90 tablet 1     No current facility-administered medications on file prior to visit.        ALLERGIES:    Allergies   Allergen Reactions    Atorvastatin Myalgia     Myalgia    Penicillins Hives, Swelling and Rash     Tolerates cephalosporins         Social History     Socioeconomic History    Marital status:      Spouse name: Brooke    Years of education: 9th grade   Tobacco Use    Smoking status: Former     Current packs/day: 0.00     Average packs/day: 1 pack/day for 50.0 years (50.0 ttl pk-yrs)     Types: Cigarettes     Start date: 1959     Quit date: 2009     Years since quittin.1      "Passive exposure: Past    Smokeless tobacco: Never    Tobacco comments:     CAFFEINE USE   Vaping Use    Vaping status: Never Used   Substance and Sexual Activity    Alcohol use: Not Currently     Comment: rarely    Drug use: No    Sexual activity: Defer     Partners: Female        Family History   Problem Relation Age of Onset    Hypertension Mother     Heart disease Mother     Heart attack Mother     Stroke Mother     Heart disease Father     Heart attack Father     Stroke Father     Hypertension Sister     Heart attack Brother     Heart disease Brother     No Known Problems Brother     Heart disease Brother     Heart attack Brother     Diabetes Brother     No Known Problems Maternal Grandmother     No Known Problems Maternal Grandfather     No Known Problems Paternal Grandmother     No Known Problems Paternal Grandfather     Pancreatic cancer Paternal Cousin     Arthritis Other     Diabetes Other     Hypertension Other     Kidney disease Other         stones    Malig Hyperthermia Neg Hx         Objective     Vitals:    02/18/25 1318   BP: 149/66   Pulse: 61   Temp: 97.6 °F (36.4 °C)   TempSrc: Oral   SpO2: 98%   Height: 172.7 cm (67.99\")   PainSc: 8    PainLoc: Generalized           2/18/2025     1:18 PM   Current Status   ECOG score 0       Physical Exam  Vitals reviewed.   Constitutional:       Appearance: Normal appearance. He is well-developed.      Comments: Using a walker.    HENT:      Head: Normocephalic and atraumatic.   Eyes:      Pupils: Pupils are equal, round, and reactive to light.   Cardiovascular:      Rate and Rhythm: Normal rate.   Pulmonary:      Effort: Pulmonary effort is normal. No respiratory distress.      Breath sounds: Normal breath sounds.   Abdominal:      Palpations: Abdomen is soft.      Comments: G tube in place.   Musculoskeletal:         General: Normal range of motion.      Cervical back: Normal range of motion.   Skin:     General: Skin is warm and dry.      Findings: No rash. "   Neurological:      Mental Status: He is alert and oriented to person, place, and time.         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  These indices have been slowly reducing over the last 2 years approximately followed more closely.  Concurrently is a mild drop in his baseline hemoglobin still well within normal limits.  thrombocytosis thought, initially, to be related to iron deficiency.  Ferritin found to be 16.3 and iron of 26 with 5% saturation and TIBC 511.   The patient was placed on ferrous gluconate which, unfortunately, he was unable to tolerate with worsening constipation.  He had further issues in early September with left renal stone, requiring cystoscopy, stent placement 8/23/2022.  9/28/2022 with repeat studies performed 9/21 demonstrating 5% iron saturation, ferritin of 12.5.  He remains further weight and fatigue, again oral iron intolerant and will require IV iron preparations for his iron deficiency anemia.  We discontinued oral iron and proceeded with Injectafer given 9/28/2022 in 10/5/2022  4/17/2023JAK  2-V617 F mutation having been detected.    In the interval he was admitted 2/19-21/23 for weakness, fall and ER evaluation not demonstrate any acute intracranial process, CT of lumbar spine with lumbar degenerative disease and other studies not show any acute abnormalities.  Fortunately he went on to improve though his wife had a positive COVID test recently on home examination.  4/10/2023 include an H&H of  18.3 and 60.9 white count of 14,700 platelet count of 477,000.  5/15/2023 hemoglobin 15.3, hematocrit 50.4.  Reviewed with Dr. Lopez plans to hold off on phlebotomy and initiate Hydrea 1000 mg daily.  We will tentatively schedule him for return follow-up visit in 2 weeks with repeat labs and reassessment.  6/1/2020 2:23 weeks of Hydrea 1000 mg daily, WBC 5.0, hemoglobin 15.2, hematocrit 49.8, platelets 113,000.  Hydrea was reduced to 500 mg daily  Stability of counts today, 6/15/2023 on 500 mg daily with WBC 4.81, hemoglobin 14.6, hematocrit 46.7%, platelets 156,000  Patient seen 6/30/2023 with H&H of 14.1 and 46.6 white count of 4800 and platelet count of 1 46,000.  Patient subsequently assessed including 9/21/2023 with H&H gradually dropping 11.1 and 32.9, white count of 5110, platelet count 151,000, .8.  10/20/2023 WBC 8.04, SNC 5.42, Hgb 13.1, Platelets 247,000.  Continue Hydrea 500 mg every other day.  11/9/2023-hospitalized with COVID-pneumonia and severe deconditioningdischarged to nursing home on 11/9/2023 off Hydrea but on Eliquis  Evidence of hemolysis in 12/23 with negative PNH workup and very low titer cold agglutinins  CBC stable - hold hydrea  Assessed 4/12/2024-stable hematologically, Hydrea held  7/2024: This assessment despite leukocytosis and thrombocytosis his anemia is not worsening and intervention with treatment such as Hydrea phlebotomy is not necessary as of yet.  1/21/2025: WBC 33.01, platelets elevated to 837,000 today. Over the last several months, blood counts have continued to increase while hydrea has been held. Reviewed results with Dr. Lopez who advised restarting hydrea 500mg daily.   2/18/2025: WBC 18.32, hemoglobin 15.5, platelets 664,000. Continue hydrea 500mg daily.     *Hospitalized in mid October to early November with COVID-pneumonia with profound deconditioning discharged to nursing home  Readmitted 11/22/2023 with anemia-patient has not been on hydroxyurea at  discharge from the hospital and remains on Eliquis  MCV is dropped significantly suggesting iron depletion and blood loss (but also may be related to being off Hydrea)  Patient himself denies hematochezia or melena  B12 folate normal ferritin 566 iron saturation 12% reticulocyte count 9% rule out hemolysis although acute blood loss can also cause an elevated reticulocyte  LDH elevated 356 haptoglobin low at 24-suggest hemolysis suggest hemolysis   Crossmatch compatible suggesting indirect claudia neg  Direct claudia Complement +?  Cold agglutinin versus PNH-PNH profile and cold agglutinin titer pending  Hemoglobin stable 11/27/2023-9.2  CT abdomen/pelvis 11/27/2023-suggestive of lower lobe pneumonia, stable pancreatic lesions probable IPMN, no evidence of bleeding  Discharged from rehab on Friday and readmitted with fall and difficulty walking 4 days later-CBC stable  Cold agglutinins 1:32 PNH negative  Patient seen 4/12/24 stable hematologically, Hydrea not reinstituted  Reassessment 7/24/2024 again stable hematologically.      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 Continue on Sinemet.     PLAN:   Continue current hydrea dosing with 500mg daily   Continue low dose Eliquis per cardiology   Return to clinic in 1 month for MD visit with CBC  Instructed to reach out sooner with any concerns.     Patient is on a high risk medication requiring close monitoring for toxicity.    Faith Alcocer, APRN

## 2025-02-18 NOTE — TELEPHONE ENCOUNTER
Caller: SullivanBrooke ([facility])    Relationship: Emergency Contact    Best call back number: [phone] (home)     Requested Prescriptions:   Requested Prescriptions     Pending Prescriptions Disp Refills    HYDROcodone-acetaminophen (NORCO) 5-325 MG per tablet 9 tablet 0     Sig: Take 1 tablet by mouth 3 (Three) Times a Day As Needed for Severe Pain or Moderate Pain. 30 day supply.        Pharmacy where request should be sent: [facility] - [place], [place] - [address] - [phone]  - [phone] FX     Last office visit with prescribing clinician: 12/20/2024   Last telemedicine visit with prescribing clinician: Visit date not found   Next office visit with prescribing clinician: Visit date not found     Does the patient have less than a 3 day supply:  [] Yes  [x] No    Would you like a call back once the refill request has been completed: [x] Yes [] No    If the office needs to give you a call back, can they leave a voicemail: [x] Yes [] No    Daniela Kramer Rep   02/18/25 10:51 EST

## 2025-02-27 ENCOUNTER — TREATMENT (OUTPATIENT)
Dept: PHYSICAL THERAPY | Facility: CLINIC | Age: 80
End: 2025-02-27
Payer: MEDICARE

## 2025-02-27 DIAGNOSIS — R26.89 BALANCE DISORDER: ICD-10-CM

## 2025-02-27 DIAGNOSIS — R29.898 DECREASED STRENGTH OF LOWER EXTREMITY: Primary | ICD-10-CM

## 2025-02-27 DIAGNOSIS — R26.9 GAIT DISTURBANCE: ICD-10-CM

## 2025-02-27 PROCEDURE — 97535 SELF CARE MNGMENT TRAINING: CPT

## 2025-02-27 PROCEDURE — 97161 PT EVAL LOW COMPLEX 20 MIN: CPT

## 2025-02-27 PROCEDURE — 97110 THERAPEUTIC EXERCISES: CPT

## 2025-02-27 NOTE — PROGRESS NOTES
Physical Therapy Initial Evaluation and Plan of Care  [facility] Therapy [address], [address]  [place] [address]     Patient: Madhu Santoro   : 1945  Referring practitioner: Faith Alcocer APRN  Date of Initial Visit: 2025  Today's Date: 2025  Patient seen for 1 sessions  PT Clinic location: [address], [address]  [id number]          Visit Diagnoses:    ICD-10-CM ICD-9-CM   1. Decreased strength of lower extremity  R29.898 729.89   2. Balance disorder  R26.89 781.99   3. Gait disturbance  R26.9 781.2       Subjective Questionnaire: ABC: 16%  PARKER/56    Subjective Evaluation    History of Present Illness  Mechanism of injury: I fell a while ago and had to go to the hospital because I had to get scratches on my forehead, I had stitches and then went home that day. I also had to go to the hospital in December because I felt like I was getting weaker and weaker. I did do therapy for a couple weeks but I did not see any improvements. I feel like when I was finished here I had a while where I was doing pretty good but then after the fall I have gradually declined again.   I am currently having a difficult time with transferring from sitting to standing. I have had to start using the walker again but had gotten to using the cane. Now I feel like I can't leave the house without a walker.     Pain  Aggravating factors: squatting, ambulation and stairs    Social Support  Lives in: one-story house (2 stairs to get into the living room with rails)    Treatments  Previous treatment: physical therapy  Patient Goals  Patient goals for therapy: decreased pain, independence with ADLs/IADLs, return to sport/leisure activities, increased strength and improved balance  Patient goal: I want to improve my strength and balance.       Medical history: extensive medical history. See chart for further detail.   Therapy Precautions:  fall risk      Objective          Strength/Myotome Testing     Left Hip   Planes of Motion   Flexion: 4-  External rotation: 4  Internal rotation: 4-    Right Hip   Planes of Motion   Flexion: 4-  External rotation: 4-  Internal rotation: 4-    Left Knee   Flexion: 4  Extension: 4-    Right Knee   Flexion: 4-  Extension: 4-    Ambulation     Ambulation: Level Surfaces   Ambulation with assistive device: independent    Ambulation: Stairs   Ascend stairs: independent  Pattern: non-reciprocal  Railings: two rails  Descend stairs: independent  Pattern: non-reciprocal  Railings: two rails    Observational Gait   Decreased walking speed and stride length.     Functional Assessment     Comments  30 sec STS: 5x with B UE assist and walker placed anteriorly   TU.85 sec with rolling walker, he carries the walker, reporting that he doesn't shuffle as much, festering gait with turn          Assessment & Plan       Assessment  Impairments: abnormal coordination, abnormal gait, activity intolerance, impaired balance, impaired physical strength, lacks appropriate home exercise program and safety issue   Functional limitations: carrying objects, lifting, walking, pushing, moving in bed, standing, stooping and reaching overhead   Assessment details: Pt is an 79 year old male who presents to PT with symptoms consistent with deconditioning of B LE. Upon initial evaluation he presents with the following deficits and impairments: decreased LE strength, gait abnormalities associated with PD, balance deficits, and risk for falling. These deficits and impairments make it difficult for the patient to complete ADLs such as ascending and descending stairs, ambulating on even and uneven surfaces, doing chores around the house and outdoors. Pt would benefit from skilled PT intervention to address the deficits noted and to improve overall safety and quality of life.     Prognosis: good    Goals  Plan Goals: SHORT TERM GOALS:   6 weeks  1.  Pt will be compliant with HEP.  2. Pt will have TUG score of 25 sec or less with quad cane in order to decrease risk for falls.  3.  Pt to perform 7 x STS in 30 sec to demonstrate improve safety with positional changes.  4. Pt will be able to ambulate for 10 min without rest break due to improved endurance and strength.  5. Pt will demonstrate 4-/5 B LE strength in order to improve gait, transfers and stair climbing ability.  6.Patient to report seeing at least a 50% improvement since beginning OPT.      LONG TERM GOALS:  12 weeks  1.Pt will be able to ambulate for 15 min or greater with improve step length and heel strike in order to decrease fall risk.  2. Pt will be able to climb 8 steps due to improved strength.  3. Pt will be able to perform 20 sit-stand without use of UE due to improved strength.  4. Pt will be able to stand on foam for 20 sec or greater with eyes open due to improved balance.  5. Pt will improve TUG score to 15 sec or less due to improved gait mechanics, strength and balance.  6.  Pt to score at least 40/56 on PARKER assessment.        Plan  Therapy options: will be seen for skilled therapy services  Planned modality interventions: cryotherapy and thermotherapy (hydrocollator packs)  Planned therapy interventions: abdominal trunk stabilization, ADL retraining, body mechanics training, balance/weight-bearing training, flexibility, functional ROM exercises, gait training, home exercise program, neuromuscular re-education, motor coordination training, postural training, soft tissue mobilization, strengthening, stretching, therapeutic activities, transfer training and IADL retraining  Frequency: 2x week  Duration in weeks: 12  Treatment plan discussed with: patient        See flowsheets for treatment detail.  Education: Discussed underlying deficits, HEP, and POC.     History # of Personal Factors and/or Comorbidities: LOW (0)  Examination of Body System(s): # of elements: LOW (1-2)  Clinical  Presentation: STABLE   Clinical Decision Making: LOW       Timed:         Manual Therapy:    -     mins  09675;     Therapeutic Exercise:    16     mins  84728;     Neuromuscular Ankush:    -    mins  59860;    Therapeutic Activity:     -     mins  46904;     Gait Training:           mins  10262;     Ultrasound:          mins  30853;    Ionto                                   mins   76153  Self Care                       8     mins   10454      Un-Timed:  Electrical Stimulation:         mins  98520 (MC );  Dry Needling          mins self-pay  Traction          mins 44997  Low Eval     34     Mins  16408  Mod Eval     -     Mins  57045  High Eval                       -     Mins  61196  Re-Eval                               mins  93135      Timed Treatment:   24   mins   Total Treatment:     58   mins    PT SIGNATURE: Mylene Hernandez PT   [place] PT license #: [id number]  DATE TREATMENT INITIATED: 2/27/2025    Initial Certification  Certification Period: 5/27/2025  I certify that the therapy services are furnished while this patient is under my care.  The services outlined above are required by this patient, and will be reviewed every 90 days.    PHYSICIAN: Faith Alcocer APRN  NPI: [id number]                                      DATE:     Please sign and return via fax to [facility] - Fax #: [phone]. Thank you, [facility].

## 2025-03-04 ENCOUNTER — TELEPHONE (OUTPATIENT)
Dept: FAMILY MEDICINE CLINIC | Facility: CLINIC | Age: 80
End: 2025-03-04

## 2025-03-04 NOTE — TELEPHONE ENCOUNTER
Caller: Madhu Santoro    Relationship: Self    Best call back number: [phone]     What is the best time to reach you: ANY    Who are you requesting to speak with (clinical staff, provider,  specific staff member): GIRISH    Do you know the name of the person who called:     What was the call regarding: PATIENT WOULD LIKE TO TALK TO GIRISH ABOUT HIS apixaban (ELIQUIS) 2.5 MG tablet tablet     Is it okay if the provider responds through MyChart:

## 2025-03-04 NOTE — TELEPHONE ENCOUNTER
Pt told unable to give samples due to dr giron is not the prescribing phys for this medication.  Pt will need to get this from prescribing provider

## 2025-03-06 ENCOUNTER — TREATMENT (OUTPATIENT)
Dept: PHYSICAL THERAPY | Facility: CLINIC | Age: 80
End: 2025-03-06
Payer: MEDICARE

## 2025-03-06 DIAGNOSIS — R26.9 GAIT DISTURBANCE: ICD-10-CM

## 2025-03-06 DIAGNOSIS — M10.9 GOUT, UNSPECIFIED CAUSE, UNSPECIFIED CHRONICITY, UNSPECIFIED SITE: Chronic | ICD-10-CM

## 2025-03-06 DIAGNOSIS — R26.89 BALANCE DISORDER: ICD-10-CM

## 2025-03-06 DIAGNOSIS — R29.898 DECREASED STRENGTH OF LOWER EXTREMITY: Primary | ICD-10-CM

## 2025-03-06 PROCEDURE — 97112 NEUROMUSCULAR REEDUCATION: CPT

## 2025-03-06 PROCEDURE — 97110 THERAPEUTIC EXERCISES: CPT

## 2025-03-06 RX ORDER — ALLOPURINOL 300 MG/1
300 TABLET ORAL DAILY
Qty: 14 TABLET | Refills: 0 | Status: SHIPPED | OUTPATIENT
Start: 2025-03-06

## 2025-03-06 NOTE — PROGRESS NOTES
Physical Therapy Daily Treatment Note  [facility] Physical Therapy [place]  [address], [address]  [place] [id number]     Patient: Madhu Santoro  : 1945  Referring practitioner: Faith Alcocer APRN  Today's Date: 3/6/2025    VISIT#: 2    Visit Diagnoses:    ICD-10-CM ICD-9-CM   1. Decreased strength of lower extremity  R29.898 729.89   2. Balance disorder  R26.89 781.99   3. Gait disturbance  R26.9 781.2       Subjective   Madhu Santoro reports: that he hasn't been able to do the supine exercises but has been doing some sitting exercises.       Objective       See Exercise, Manual, and Modality Logs for complete treatment.     Patient Education: HEP review  Exercise rationale/ pain free exercise performance  Alternate exercise positions  Verbal/Tactile cues to ensure correct exercise performance/technique       Assessment/Plan  Patient demonstrates good tolerance to continued and new therapeutic exercises and activities. We continued with LE strength with report of general fatigue throughout. Discussed options of exercises to do in a seated position due to him not being able to lay down to do exercises at home. Will continue to progress as tolerated.       Progress per Plan of Care and Progress strengthening /stabilization /functional activity          Timed:         Manual Therapy:    -     mins  06443;     Therapeutic Exercise:    15     mins  26531;     Neuromuscular Ankush:    15    mins  35355;    Therapeutic Activity:     -     mins  38897;     Gait Training:           mins  93850;     Ultrasound:          mins  68922;    Ionto:                                   mins  22198  Self Care:                       -     mins  07779    Un-Timed:  Electrical Stimulation:         mins  24533 ( );  Dry Needling          mins self-pay  Traction          mins 25718  Re-Eval                               mins  39720  Group Therapy           ___ mins 80801    Timed Treatment:   30    mins   Total Treatment:     52   mins    Mylene Hernandez PT  Physical Therapist  [place] REECE license #: [id number]

## 2025-03-11 ENCOUNTER — TREATMENT (OUTPATIENT)
Dept: PHYSICAL THERAPY | Facility: CLINIC | Age: 80
End: 2025-03-11
Payer: MEDICARE

## 2025-03-11 DIAGNOSIS — R26.89 BALANCE DISORDER: ICD-10-CM

## 2025-03-11 DIAGNOSIS — R29.898 DECREASED STRENGTH OF LOWER EXTREMITY: Primary | ICD-10-CM

## 2025-03-11 DIAGNOSIS — R26.9 GAIT DISTURBANCE: ICD-10-CM

## 2025-03-11 PROCEDURE — 97110 THERAPEUTIC EXERCISES: CPT

## 2025-03-11 PROCEDURE — 97112 NEUROMUSCULAR REEDUCATION: CPT

## 2025-03-11 PROCEDURE — 97530 THERAPEUTIC ACTIVITIES: CPT

## 2025-03-11 NOTE — PROGRESS NOTES
"Physical Therapy Daily Treatment Note  [facility] Physical Therapy [place]  [address], [address]  [place] [address]     Patient: Madhu Santoro  : 1945  Referring practitioner: Faith Alcocer APRN  Today's Date: 3/11/2025    VISIT#: 3    Visit Diagnoses:    ICD-10-CM ICD-9-CM   1. Decreased strength of lower extremity  R29.898 729.89   2. Gait disturbance  R26.9 781.2   3. Balance disorder  R26.89 781.99       Subjective   Madhu Santoro reports:   \"I feel like I'm getting weaker all the time.\"  Pt reports he continues to work on the sitting exercises because he doesn't have a great surface for lying down to do the supine ones.       Objective   See Exercise, Manual, and Modality Logs for complete treatment.     Patient Education:   HEP review  Exercise rationale/ pain free exercise performance  Alternate exercise positions  Verbal/Tactile cues to ensure correct exercise performance/technique       Assessment/Plan  Patient demonstrates good tolerance to continued and new therapeutic exercises and activities, with cues to move through as large a range as possible with each exercise.  Progressed resistance with several exercises with pt noting and demonstrating rapid fatigue with added resistance.   Will continue to progress as tolerated.       Progress per Plan of Care and Progress strengthening /stabilization /functional activity          Timed:         Manual Therapy:    -     mins  12442;     Therapeutic Exercise:    20     mins  34475;     Neuromuscular Ankush:   15    mins  09793;    Therapeutic Activity:     10     mins  93771;     Gait Training:           mins  45690;     Ultrasound:          mins  96423;    Ionto:                                   mins  80412  Self Care:                       -     mins  33359    Un-Timed:  Electrical Stimulation:         mins  07472 ( );  Dry Needling          mins self-pay  Traction          mins 05895  Re-Eval                          "      mins  63469  Group Therapy           ___ mins 49899    Timed Treatment:   45   mins   Total Treatment:     55   mins    FREDY Lemus License #[id number]  Physical Therapist Assistant

## 2025-03-13 ENCOUNTER — TELEPHONE (OUTPATIENT)
Dept: PHYSICAL THERAPY | Facility: CLINIC | Age: 80
End: 2025-03-13

## 2025-03-13 NOTE — TELEPHONE ENCOUNTER
Caller: Brooke Santoro (HCS)    Relationship: Emergency Contact        What was the call regarding: HAS BEEN UP ALL NIGHT SICK.

## 2025-03-17 ENCOUNTER — OFFICE VISIT (OUTPATIENT)
Dept: PAIN MEDICINE | Facility: CLINIC | Age: 80
End: 2025-03-17
Payer: MEDICARE

## 2025-03-17 VITALS
OXYGEN SATURATION: 95 % | WEIGHT: 133.4 LBS | HEART RATE: 53 BPM | BODY MASS INDEX: 20.22 KG/M2 | HEIGHT: 68 IN | DIASTOLIC BLOOD PRESSURE: 56 MMHG | SYSTOLIC BLOOD PRESSURE: 133 MMHG | TEMPERATURE: 97.6 F

## 2025-03-17 DIAGNOSIS — G89.29 CHRONIC BILATERAL LOW BACK PAIN WITHOUT SCIATICA: ICD-10-CM

## 2025-03-17 DIAGNOSIS — M47.816 LUMBAR FACET ARTHROPATHY: ICD-10-CM

## 2025-03-17 DIAGNOSIS — G89.29 CHRONIC PAIN OF BOTH HIPS: Primary | ICD-10-CM

## 2025-03-17 DIAGNOSIS — M25.50 ARTHRALGIA OF MULTIPLE JOINTS: ICD-10-CM

## 2025-03-17 DIAGNOSIS — M54.50 CHRONIC BILATERAL LOW BACK PAIN WITHOUT SCIATICA: ICD-10-CM

## 2025-03-17 DIAGNOSIS — M25.551 CHRONIC PAIN OF BOTH HIPS: Primary | ICD-10-CM

## 2025-03-17 DIAGNOSIS — M25.552 CHRONIC PAIN OF BOTH HIPS: Primary | ICD-10-CM

## 2025-03-17 DIAGNOSIS — M51.370 DEGENERATION OF INTERVERTEBRAL DISC OF LUMBOSACRAL REGION WITH DISCOGENIC BACK PAIN: ICD-10-CM

## 2025-03-17 DIAGNOSIS — Z79.899 ENCOUNTER FOR LONG-TERM (CURRENT) USE OF HIGH-RISK MEDICATION: ICD-10-CM

## 2025-03-17 DIAGNOSIS — M51.379 DDD (DEGENERATIVE DISC DISEASE), LUMBOSACRAL: ICD-10-CM

## 2025-03-17 PROCEDURE — 99214 OFFICE O/P EST MOD 30 MIN: CPT

## 2025-03-17 PROCEDURE — 3078F DIAST BP <80 MM HG: CPT

## 2025-03-17 PROCEDURE — 1160F RVW MEDS BY RX/DR IN RCRD: CPT

## 2025-03-17 PROCEDURE — 1125F AMNT PAIN NOTED PAIN PRSNT: CPT

## 2025-03-17 PROCEDURE — 1159F MED LIST DOCD IN RCRD: CPT

## 2025-03-17 PROCEDURE — 3075F SYST BP GE 130 - 139MM HG: CPT

## 2025-03-17 RX ORDER — HYDROCODONE BITARTRATE AND ACETAMINOPHEN 5; 325 MG/1; MG/1
1 TABLET ORAL EVERY 8 HOURS PRN
Qty: 90 TABLET | Refills: 0 | Status: SHIPPED | OUTPATIENT
Start: 2025-03-17

## 2025-03-17 NOTE — PROGRESS NOTES
"CHIEF COMPLAINT  Back and joint pain    Subjective   Madhu Santoro is a 79 y.o. male  who presents for follow-up.  He has a history of chronic back and joint pain. He reports that his pain has remained consistent since his last office visit.    Today pain is 8/10VAS in severity (no acute distress noted at this time). Pain is located in his low back and radiates down right lateral leg. He also has pain throughout multiple joints (hands, feet, knees) and notes numbness in bilateral feet. Describes this pain as a nearly continuous aching, burning, and throbbing. He states BLE feel \"heavy\". He has fallen twice since her was here last. Pain is worsened by prolonged walking or standing, increased physical activity, and bending/twisitng. Pain improves with rest/reposition, use of a heating pad, and medication. He has completed 1 session of PT since his last office visit. He is scheduled to go again tomorrow.      Continues with Hydrocodone 5mg 2-3/day (last dose of medication this morning) and OTC Tylenol PRN. Denies any side side effects from the regimen including somnolence and constipation. The regimen helps \"take the edge off\". Notes improvement in activity and function with regimen. ADL's by self. Denies any bowel or bladder changes. He takes Allopurinol for gout (Dr. Sanchez), and Sinemet (MY Geller). He is no longer taking Elavil.      Not a candidate for NSAIDs - history of TIA's and remains on Eliquis (prescribed by Dr. Sanchez)     Unable to tolerate Pregabalin - caused BLE swelling    He was admitted to [facility] for 5 days from 1/28/25 - 2/2/25 due to generalized weakness and fatigue.  Reviewed ED note from Dr. Dugan 2/2/2025.  Neurology was consulted on admission and believed he was being underdosed on current Parkinson's medication so they increased Sinemet to 4 times a day and added entacapone.healing to follow-up with outpatient neurology.  He then went to a skilled nursing facility for 3 weeks. "     He is scheduled to follow up with Dr. Russell on 3/20/25    Back Pain  This is a chronic problem. The current episode started more than 1 year ago. The problem occurs constantly. The problem is unchanged (pain has remained consistent since his last office visit). The pain is present in the lumbar spine. The quality of the pain is described as aching (throbbing). Radiates to: to bilateral legs L > R  The pain is at a severity of 8/10. The symptoms are aggravated by standing, sitting, twisting, bending, lying down and position (walking long distances, weather changes). Stiffness is present All day. Associated symptoms include numbness (hands) and weakness. Pertinent negatives include no abdominal pain, chest pain, dysuria, fever or headaches. He has tried heat and analgesics (rest/reposition, PT in the past (this caused increased pain)) for the symptoms. The treatment provided mild relief.   Joint Pain  This is a chronic (bilateral hands, left shoulder, bilateral knees) problem. The current episode started more than 1 year ago. The problem occurs daily. The problem has been waxing and waning (pain has remained consistent since his last office visi). Associated symptoms include arthralgias, fatigue, numbness (hands) and weakness. Pertinent negatives include no abdominal pain, chest pain, chills, congestion, coughing, fever or headaches. The symptoms are aggravated by walking, standing and bending (weather changes, increased physical activity, ). He has tried oral narcotics, position changes, rest, heat, lying down and acetaminophen for the symptoms. The treatment provided moderate relief.      PEG Assessment   What number best describes your pain on average in the past week?8  What number best describes how, during the past week, pain has interfered with your enjoyment of life?0  What number best describes how, during the past week, pain has interfered with your general activity?  10    Review of Pertinent Medical  "Data ---          The following portions of the patient's history were reviewed and updated as appropriate: allergies, current medications, past family history, past medical history, past social history, past surgical history, and problem list.    Review of Systems   Constitutional:  Positive for fatigue. Negative for activity change, chills and fever.   HENT:  Negative for congestion.    Eyes:  Negative for visual disturbance.   Respiratory:  Negative for cough, chest tightness and shortness of breath.    Cardiovascular:  Negative for chest pain.   Gastrointestinal:  Negative for abdominal pain, constipation and diarrhea.   Genitourinary:  Negative for difficulty urinating and dysuria.   Musculoskeletal:  Positive for arthralgias and back pain.   Neurological:  Positive for dizziness, weakness, light-headedness and numbness (hands). Negative for headaches.   Psychiatric/Behavioral:  Negative for agitation, self-injury, sleep disturbance and suicidal ideas. The patient is not nervous/anxious.      I have reviewed and confirmed the accuracy of the ROS as documented by the MA/XIOMARAN/RN MY Perez    Vitals:    03/17/25 1339   BP: 133/56   Pulse: 53   Temp: 97.6 °F (36.4 °C)   SpO2: 95%   Weight: 60.5 kg (133 lb 6.4 oz)   Height: 172.7 cm (68\")   PainSc: 8    PainLoc: Back     Objective   Physical Exam  Constitutional:       Appearance: Normal appearance.   HENT:      Head: Normocephalic.   Cardiovascular:      Rate and Rhythm: Normal rate and regular rhythm.   Pulmonary:      Effort: Pulmonary effort is normal.      Breath sounds: Decreased air movement present.   Musculoskeletal:      Cervical back: Normal range of motion.      Lumbar back: Tenderness present. Decreased range of motion. Positive right straight leg raise test and positive left straight leg raise test.      Comments: Diffuse arthritic changes   Skin:     General: Skin is warm and dry.      Capillary Refill: Capillary refill takes less than 2 " seconds.   Neurological:      General: No focal deficit present.      Mental Status: He is alert and oriented to person, place, and time.      Gait: Gait abnormal (slowed, ambulates with walker).   Psychiatric:         Mood and Affect: Mood normal.         Behavior: Behavior normal.         Thought Content: Thought content normal.         Cognition and Memory: Cognition normal.       Assessment & Plan   Diagnoses and all orders for this visit:    1. Chronic pain of both hips (Primary)  -     HYDROcodone-acetaminophen (NORCO) 5-325 MG per tablet; Take 1 tablet by mouth Every 8 (Eight) Hours As Needed for Severe Pain or Moderate Pain. 30 day supply. DNF 3/20/25  Dispense: 90 tablet; Refill: 0    2. Arthralgia of multiple joints  -     HYDROcodone-acetaminophen (NORCO) 5-325 MG per tablet; Take 1 tablet by mouth Every 8 (Eight) Hours As Needed for Severe Pain or Moderate Pain. 30 day supply. DNF 3/20/25  Dispense: 90 tablet; Refill: 0    3. Degeneration of intervertebral disc of lumbosacral region with discogenic back pain    4. Chronic bilateral low back pain without sciatica    5. Lumbar facet arthropathy    6. Encounter for long-term (current) use of high-risk medication    7. DDD (degenerative disc disease), lumbosacral  -     HYDROcodone-acetaminophen (NORCO) 5-325 MG per tablet; Take 1 tablet by mouth Every 8 (Eight) Hours As Needed for Severe Pain or Moderate Pain. 30 day supply. DNF 3/20/25  Dispense: 90 tablet; Refill: 0      Madhu Santoro reports a pain score of 8.  Given his pain assessment as noted, treatment options were discussed and the following options were decided upon as a follow-up plan to address the patient's pain: continuation of current treatment plan for pain and prescription for opiod analgesics.    --- The urine drug screen confirmation from 10/31/24 has been reviewed and the result is appropriate based on patient history and RUTH report  --- The patient signed an updated copy of the  controlled substance agreement on 6/24/24   --- ORT performed on 6/24/24 -- low risk  --- Continue Hydrocodone. DNF 3/20/25. Patient appears stable with current regimen. No adverse effects. Regarding continuation of opioids, there is no evidence of aberrant behavior or any red flags.  The patient continues with appropriate response to opioid therapy. ADL's remain intact by self.   --- Follow-up 3 months or sooner if needed       RUTH REPORT  As part of the patient's treatment plan, I am prescribing controlled substances. The patient has been made aware of appropriate use of such medications, including potential risk of somnolence, limited ability to drive and/or work safely, and the potential for dependence or overdose. It has also been made clear that these medications are for use by this patient only, without concomitant use of alcohol or other substances unless prescribed.     Patient has completed prescribing agreement detailing terms of continued prescribing of controlled substances, including monitoring RUTH reports, urine drug screening, and pill counts if necessary. The patient is aware that inappropriate use will results in cessation of prescribing such medications.    As the clinician, I personally reviewed the RUTH from 3/17/25 while the patient was in the office today.    History and physical exam exhibit continued safe and appropriate use of controlled substances.    Dictated utilizing Dragon dictation.

## 2025-03-18 ENCOUNTER — TREATMENT (OUTPATIENT)
Dept: PHYSICAL THERAPY | Facility: CLINIC | Age: 80
End: 2025-03-18
Payer: MEDICARE

## 2025-03-18 DIAGNOSIS — R26.89 BALANCE DISORDER: ICD-10-CM

## 2025-03-18 DIAGNOSIS — R29.898 DECREASED STRENGTH OF LOWER EXTREMITY: Primary | ICD-10-CM

## 2025-03-18 DIAGNOSIS — R26.9 GAIT DISTURBANCE: ICD-10-CM

## 2025-03-18 PROCEDURE — 97110 THERAPEUTIC EXERCISES: CPT

## 2025-03-18 PROCEDURE — 97535 SELF CARE MNGMENT TRAINING: CPT

## 2025-03-18 PROCEDURE — 97112 NEUROMUSCULAR REEDUCATION: CPT

## 2025-03-18 NOTE — PROGRESS NOTES
Physical Therapy Daily Treatment Note  [facility] [place]  [address], [address]  [place] [address]     Patient: Madhu Santoro  : 1945  Referring practitioner: Faith Alcocer APRN  Today's Date: 3/18/2025    VISIT#: 4    Visit Diagnoses:    ICD-10-CM ICD-9-CM   1. Decreased strength of lower extremity  R29.898 729.89   2. Gait disturbance  R26.9 781.2   3. Balance disorder  R26.89 781.99       Subjective   Madhu Santoro reports: that after doing the exercises at PT he tolerated them well but then the next 6 days he was exhausted and couldn't do much at all because of the virus. He goes back to see Dr. Russell next week and is going to update him on the medication. It has been giving him some stomach issues.      Objective       See Exercise, Manual, and Modality Logs for complete treatment.     Patient Education: HEP review  Exercise rationale/ pain free exercise performance  Alternate exercise positions  Verbal/Tactile cues to ensure correct exercise performance/technique       Assessment/Plan  Patient demonstrates good tolerance to continued therapeutic exercises on this date, progressed resistance bands with supine hip flexion and abduction to a red TB. With glute bridges and hip adduction isometrics patient was rotated to his right and was using more of his L LE to perform the exercises, he reports that he did not notice this happening. Will continue to progress as tolerated. Also, discussed a bike he recently bought and what weights to purchase for [employer].       Progress per Plan of Care and Progress strengthening /stabilization /functional activity          Timed:         Manual Therapy:    -     mins  86793;     Therapeutic Exercise:    35     mins  05990;     Neuromuscular Ankush:    12    mins  46570;    Therapeutic Activity:     -     mins  54326;     Gait Training:           mins  64345;     Ultrasound:          mins  52659;    Ionto:                                    mins  31597  Self Care:                       8     mins  65484    Un-Timed:  Electrical Stimulation:         mins  77093 ( );  Dry Needling          mins self-pay  Traction          mins 67742  Re-Eval                               mins  75606  Group Therapy           ___ mins 30869    Timed Treatment:   55   mins   Total Treatment:     57   mins    Mylene Hernandez PT  Physical Therapist  [place] license #: [id number]

## 2025-03-20 ENCOUNTER — OFFICE VISIT (OUTPATIENT)
Dept: NEUROLOGY | Facility: CLINIC | Age: 80
End: 2025-03-20
Payer: MEDICARE

## 2025-03-20 VITALS
DIASTOLIC BLOOD PRESSURE: 76 MMHG | HEIGHT: 68 IN | OXYGEN SATURATION: 96 % | HEART RATE: 45 BPM | BODY MASS INDEX: 20 KG/M2 | SYSTOLIC BLOOD PRESSURE: 132 MMHG | WEIGHT: 132 LBS

## 2025-03-20 DIAGNOSIS — G60.9 PERIPHERAL NEUROPATHY, IDIOPATHIC: Primary | ICD-10-CM

## 2025-03-20 DIAGNOSIS — G20.A1 PARKINSON'S DISEASE WITHOUT DYSKINESIA OR FLUCTUATING MANIFESTATIONS: ICD-10-CM

## 2025-03-20 PROCEDURE — 99214 OFFICE O/P EST MOD 30 MIN: CPT | Performed by: PSYCHIATRY & NEUROLOGY

## 2025-03-20 PROCEDURE — 3075F SYST BP GE 130 - 139MM HG: CPT | Performed by: PSYCHIATRY & NEUROLOGY

## 2025-03-20 PROCEDURE — 3078F DIAST BP <80 MM HG: CPT | Performed by: PSYCHIATRY & NEUROLOGY

## 2025-03-20 PROCEDURE — 1159F MED LIST DOCD IN RCRD: CPT | Performed by: PSYCHIATRY & NEUROLOGY

## 2025-03-20 PROCEDURE — 1160F RVW MEDS BY RX/DR IN RCRD: CPT | Performed by: PSYCHIATRY & NEUROLOGY

## 2025-03-20 RX ORDER — CARBIDOPA AND LEVODOPA 25; 100 MG/1; MG/1
1 TABLET ORAL 4 TIMES DAILY
Qty: 120 TABLET | Refills: 4 | Status: SHIPPED | OUTPATIENT
Start: 2025-03-20 | End: 2025-04-19

## 2025-03-20 NOTE — PROGRESS NOTES
Chief Complaint   Patient presents with    Parkinson's Disease    Peripheral Neuropathy    Extremity Weakness     Bilat. Lower extr.       Patient ID: Madhu Santoro is a 79 y.o. male.    HPI:  I had the pleasure of seeing your patient today.  As you may know he is an 79-year-old gentleman here for the management of Parkinson's disease and peripheral neuropathy.  He was recently admitted to the hospital after experiencing some generalized progressive weakness.  He was having some cough and chills.  He also had been experiencing a burning sensation with urination.  He was taking Sinemet 25/250 3 times daily which was increased to 4 times daily in the hospital.  Apparently entacapone was also started.  That caused severe nausea and lethargy for him.  Therefore they have since discontinued both medications.  He has not had any hallucinations.  He does have significant slowing of movements.  He has not had any recent falls.  He is still in physical therapy.  That has helped some with respect to gait and balance.    The following portions of the patient's history were reviewed and updated as appropriate: allergies, current medications, past family history, past medical history, past social history, past surgical history and problem list.    Review of Systems   Constitutional:  Positive for fatigue.   Musculoskeletal:  Positive for back pain, gait problem and myalgias.   Neurological:  Positive for tremors, speech difficulty, weakness, light-headedness and numbness. Negative for dizziness, seizures, syncope, facial asymmetry and headaches.   Psychiatric/Behavioral:  Positive for agitation. Negative for behavioral problems, confusion, decreased concentration, dysphoric mood, hallucinations, self-injury, sleep disturbance and suicidal ideas. The patient is not nervous/anxious and is not hyperactive.       I have reviewed the review of systems above performed by my medical assistant.      Vitals:    03/20/25 1328   BP:  132/76   Pulse: (!) 45   SpO2: 96%       Neurological Exam  Mental Status  Awake, alert and oriented to person, place and time. Recent and remote memory are intact. Speech is normal. Language is fluent with no aphasia. Attention and concentration are normal. Fund of knowledge is appropriate for level of education.    Cranial Nerves  CN I: Sense of smell is normal.  CN II: Visual acuity is normal.  CN III, IV, VI: Extraocular movements intact bilaterally. Pupils equal round and reactive to light bilaterally.  CN V: Facial sensation is normal.  CN VII: Full and symmetric facial movement.  CN XI: Shoulder shrug strength is normal.  CN XII: Tongue midline without atrophy or fasciculations.    Motor  Normal muscle bulk throughout. No fasciculations present. Normal muscle tone. The following abnormal movements were seen: No pronator drift.                                             Right                     Left  Rhomboids                            5                          5  Infraspinatus                          5                          5  Supraspinatus                       5                          5  Deltoid                                   5                          5   Biceps                                   5                          5  Brachioradialis                      5                          5   Triceps                                  5                          5   Pronator                                5                          5   Supinator                              5                           5   Wrist flexor                            5                          5   Wrist extensor                       5                          5   Finger flexor                          5                          5   Finger extensor                     5                          5   Interossei                              5                          5   Abductor pollicis brevis         5                           5   Flexor pollicis brevis             5                          5   Opponens pollicis                 5                          5  Extensor digitorum               5                          5  Abductor digiti minimi           5                          5   Abdominal                            5                          5  Glutei                                    5                          5  Hip abductor                         5                          5  Hip adductor                         5                          5   Iliopsoas                               5                          5   Quadriceps                           5                          5   Hamstring                             5                          5   Gastrocnemius                     5                           5   Anterior tibialis                      5                          5   Posterior tibialis                    5                          5   Peroneal                               5                          5  Ankle dorsiflexor                   5                          5  Ankle plantar flexor              5                           5  Extensor hallucis longus      5                           5  Bradykinesia noted in both upper extremities.  Masked facies.    Sensory  Sensation is intact to light touch, pinprick, vibration and proprioception in all four extremities.    Reflexes  Deep tendon reflexes are 2+ and symmetric in all four extremities.    Right pathological reflexes: Jamila's absent.  Left pathological reflexes: Jamila's absent.    Coordination    Finger-to-nose, rapid alternating movements and heel-to-shin normal bilaterally without dysmetria.    Gait  Casual gait: Narrow stance. Reduced stride length. Shuffling gait. Reduced right arm swing. Reduced left arm swing.       Physical Exam  Vitals reviewed.   Constitutional:       Appearance: He is well-developed.   HENT:      Head: Normocephalic and  atraumatic.   Eyes:      Extraocular Movements: Extraocular movements intact.      Pupils: Pupils are equal, round, and reactive to light.   Cardiovascular:      Rate and Rhythm: Normal rate and regular rhythm.   Pulmonary:      Breath sounds: Normal breath sounds.   Musculoskeletal:         General: Normal range of motion.   Skin:     General: Skin is warm.   Neurological:      Coordination: Coordination is intact.      Deep Tendon Reflexes: Reflexes are normal and symmetric.   Psychiatric:         Speech: Speech normal.         Procedures    Assessment/Plan: Our plan will be to start him back on Sinemet however I would like to started at 25/100, 4 times daily.  He will continue with the physical therapy for now and we will see him back in about 3 months or sooner if needed.  A total of 30 minutes was spent face-to-face with the patient today.  Of that greater than 50% of this time was spent discussing signs and symptoms of Parkinson's disease, patient education, plan of care and prognosis.         Diagnoses and all orders for this visit:    1. Peripheral neuropathy, idiopathic (Primary)  -     carbidopa-levodopa (SINEMET)  MG per tablet; Take 1 tablet by mouth 4 (Four) Times a Day for 30 days.  Dispense: 120 tablet; Refill: 4    2. Parkinson's disease without dyskinesia or fluctuating manifestations  -     carbidopa-levodopa (SINEMET)  MG per tablet; Take 1 tablet by mouth 4 (Four) Times a Day for 30 days.  Dispense: 120 tablet; Refill: 4           Mukesh Russell II, MD

## 2025-03-21 ENCOUNTER — TREATMENT (OUTPATIENT)
Dept: PHYSICAL THERAPY | Facility: CLINIC | Age: 80
End: 2025-03-21
Payer: MEDICARE

## 2025-03-21 DIAGNOSIS — R26.89 BALANCE DISORDER: ICD-10-CM

## 2025-03-21 DIAGNOSIS — R29.898 DECREASED STRENGTH OF LOWER EXTREMITY: Primary | ICD-10-CM

## 2025-03-21 DIAGNOSIS — R26.9 GAIT DISTURBANCE: ICD-10-CM

## 2025-03-21 PROCEDURE — 97110 THERAPEUTIC EXERCISES: CPT

## 2025-03-21 NOTE — PROGRESS NOTES
Physical Therapy Daily Treatment Note  [facility] [place]  [address], [address]  [place] [address]     Patient: Madhu Santoro  : 1945  Referring practitioner: Faith Alcocer APRN  Today's Date: 3/21/2025    VISIT#: 5    Visit Diagnoses:    ICD-10-CM ICD-9-CM   1. Decreased strength of lower extremity  R29.898 729.89   2. Gait disturbance  R26.9 781.2   3. Balance disorder  R26.89 781.99       Subjective   Madhu Santoro reports: that he just always feels weak. He did get to go to the boat with a friend on Wednesday and this felt pretty good. His son was able to get some DBs for him for him to start doing some UE exercises as well.       Objective       See Exercise, Manual, and Modality Logs for complete treatment.     Patient Education: HEP review  Exercise rationale/ pain free exercise performance  Alternate exercise positions  Verbal/Tactile cues to ensure correct exercise performance/technique       Assessment/Plan  Patient demonstrates good tolerance to continued therapeutic exercises on this date with report of general fatigue throughout. He continues to lean to his right through the visit with bridges and hooklying exercises. Targeted unilateral LE as well on this date. He continues to have some difficulty with transfers as well. Will continue to progress as tolerated.      Progress per Plan of Care and Progress strengthening /stabilization /functional activity          Timed:         Manual Therapy:    -     mins  39229;     Therapeutic Exercise:    25     mins  22842;     Neuromuscular Ankush:    -    mins  01099;    Therapeutic Activity:     5     mins  65003;     Gait Training:           mins  79254;     Ultrasound:          mins  91499;    Ionto:                                   mins  54989  Self Care:                       -     mins  31682    Un-Timed:  Electrical Stimulation:         mins  89993 ( );  Dry Needling          mins  self-pay  Traction          mins 02507  Re-Eval                               mins  17559  Group Therapy           ___ mins 83028    Timed Treatment:   30   mins   Total Treatment:     53   mins    Mylene Hernandez PT  Physical Therapist  [place] license #: [id number]

## 2025-03-24 DIAGNOSIS — G60.9 PERIPHERAL NEUROPATHY, IDIOPATHIC: ICD-10-CM

## 2025-03-24 RX ORDER — GABAPENTIN 100 MG/1
100 CAPSULE ORAL 2 TIMES DAILY
Qty: 60 CAPSULE | Refills: 0 | OUTPATIENT
Start: 2025-03-24

## 2025-03-25 ENCOUNTER — TREATMENT (OUTPATIENT)
Dept: PHYSICAL THERAPY | Facility: CLINIC | Age: 80
End: 2025-03-25
Payer: MEDICARE

## 2025-03-25 DIAGNOSIS — R26.89 BALANCE DISORDER: ICD-10-CM

## 2025-03-25 DIAGNOSIS — R29.898 DECREASED STRENGTH OF LOWER EXTREMITY: Primary | ICD-10-CM

## 2025-03-25 DIAGNOSIS — R26.9 GAIT DISTURBANCE: ICD-10-CM

## 2025-03-25 PROCEDURE — 97110 THERAPEUTIC EXERCISES: CPT

## 2025-03-25 PROCEDURE — 97112 NEUROMUSCULAR REEDUCATION: CPT

## 2025-03-25 NOTE — PROGRESS NOTES
Physical Therapy Daily Treatment Note  [facility] [place]  [address], [address]  [place] [id number]     Patient: Madhu Santoro  : 1945  Referring practitioner: Faith Alcocer APRN  Today's Date: 3/25/2025    VISIT#: 6    Visit Diagnoses:    ICD-10-CM ICD-9-CM   1. Decreased strength of lower extremity  R29.898 729.89   2. Gait disturbance  R26.9 781.2   3. Balance disorder  R26.89 781.99       Subjective   Madhu Santoro reports: that he continues to feel fatigued and weak.       Objective       See Exercise, Manual, and Modality Logs for complete treatment.     Patient Education: HEP review  Exercise rationale/ pain free exercise performance  Alternate exercise positions  Verbal/Tactile cues to ensure correct exercise performance/technique       Assessment/Plan  Patient demonstrates good tolerance to continued therapeutic exercises on this date, verbal cues  were provided throughout for proper form and technique. He reports that hooklying hip abduction was very challenging on this date. He does demonstrate less favoring of the right side. He also continues to show good progress with STS, continues to require B UE assist. Will continue to progress LE strength as tolerated, patient will greatly.       Progress per Plan of Care and Progress strengthening /stabilization /functional activity          Timed:         Manual Therapy:    -     mins  22446;     Therapeutic Exercise:    11     mins  43066;     Neuromuscular Ankush:    8    mins  74791;    Therapeutic Activity:     5     mins  96139;     Gait Training:           mins  44666;     Ultrasound:          mins  92026;    Ionto:                                   mins  86114  Self Care:                       -     mins  23861    Un-Timed:  Electrical Stimulation:         mins  46862 ( );  Dry Needling          mins self-pay  Traction          mins 88124  Re-Eval                               mins  24206  Group  Therapy           ___ mins 10837    Timed Treatment:   24   mins   Total Treatment:     55   mins    Mylene Hernandez PT  Physical Therapist  [place] license #: [id number]

## 2025-03-25 NOTE — PROGRESS NOTES
REASON FOR FOLLOW-UP: Polycythemia vera, JAK2 mutation detected    HISTORY OF PRESENT ILLNESS:    He returns today for 1 month follow up since restarting hydrea. He was admitted from 1/28/2025-2/2/2025 for generalized weakness thought to be secondary to subtherapeutic sinemet for his parkinsons as well as a recent viral infection. He was discharged to a nursing home and was completing rehab there. He continues to be extremely weak and dizzy all the time. His spouse is working on getting him scheduled for outpatient PT starting in March. He denies chest pain or worsening shortness of breath. He continues to take hydrea 500mg daily and thinks he is tolerating this okay.     Patient seen by neurology 3/20/2025 for continued treatment of Parkinson's and peripheral neuropathy.  He is taking Sinemet 25/253 times daily increased to 4 times with entacapone also started.  This led to nausea and lethargy and the entacapone was discontinued.    He is next seen 3/26/2025 with H&H of 15.7 and 53.5 with white count of 11,830 and platelet count of 253,000.  He remains weak but is tolerating current Hydrea dosing and will not require phlebotomy at this point.      Hematologic/oncologic history:     The patient is a 79-year-old male followed with a number of issues including coronary artery disease, status post CABG, atrial flutter, previous CVA hyperlipidemia, GE reflux, carotid vascular disease, and hypertension.     He had been seen by vascular 2/25/2022 with mild progression of carotid disease but otherwise stable and also had follow-up with pain management for back pain 3/28/2022 requiring chronic narcotic therapy.  Patient has been resistant to epidural like procedures.     Unfortunately has had concurrent constipation requiring laxatives and additional opioid antagonist to reduce GI hypomotility.  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     Patient recently reviewed again by pain management with worsening pain.  Patient also saw GI periodically undergoing a follow-up MRI of the abdomen 8/9/2022 with scattered pancreatic cystic lesions unchanged from previous.  He had previously undergone EGD and colonoscopy 11/5/2021 with irregular Z-line and biopsies taken in nonbleeding internal hemorrhoids found during retroflexion that were small.  There are scattered small and large mouth diverticula found in the sigmoid colon descending colon and splenic flexure.       The patient now presents for thrombocytosis with review of his record over the last year demonstrating a platelet count that is escalated from 3-400,000 now to 8/11/2022 648,000 but concurrent microcytic and hypochromic indices.  These indices have been slowly reducing over the last 2 years approximately followed more closely.        These findings are discussed with the patient 8/16/2022 who indicates that he actually feels about the same though still has issues with back pain.  He is noted no change in bowel habit including, fortunately, improvement of his constipation without the use of additional medication such as Movantik.  He has not noted any change in the color of his stool including dark stools or any blood per rectum, urine though he does note periodic excess bruising from his anticoagulation therapy.       The patient moved to a trial of iron which, unfortunately, worsened his constipation in which he had discontinue. He had further issues in early September with left renal stone, requiring cystoscopy, stent placement 8/23/2022.       He is next seen back 9/28/2022 with repeat studies performed 9/21 demonstrating 5% iron saturation, ferritin of 12.5.  He remains further weight and fatigue, again oral iron intolerant and will require IV iron preparations for his iron deficiency anemia.      The patient  is next seen 1/18/2023 with considerable improvement in his testing including H&H now 17.2 and 55.4 though with iron saturation of 8% and ferritin 26.5.  He has not noted any blood loss but remains generally weak and with ongoing periodic abdominal pain.  His major concerns continue to be a disequilibrium since his previous CVA symptoms.  He has not been seen in follow-up by neurology.  He continues to have hematuria by urinary assessment but not gross findings.  We have discussed that he may actually have a myeloproliferative disorder and requested that he undergo an assessment for JAK2 analysis today.    He is seen back 4/17/2023JAK  2-V617 F mutation having been detected.  In the interval he was admitted 2/19-21/23 for weakness, fall and ER evaluation not demonstrate any acute intracranial process, CT of lumbar spine with lumbar degenerative disease and other studies not show any acute abnormalities.  Fortunately he went on to improve though his wife had a positive COVID test recently on home examination.  His follow-up testing 4/10/2023 include an H&H of 18.3 and 60.9 white count of 14,700 platelet count of 477,000.    He is seen 4/17/2023 and we discussed that he is better served with phlebotomy at this point.  He states he feels so poorly that he is quite willing to try to move to additional therapies including phlebotomy.    Patient initiating Hydrea 1000 mg daily as of 5/19/2023.    He is next seen back in office 6/30/2023.  He is gradually seen an improvement in his hemoglobin hematocrit dropping to a normal CBC approximately 6/30/2023 H&H of 14.1 and 46.6, white count of 4800 and platelet count of 146,000.  He is tolerating treatment well without additional side effects.    Patient seen 9/21/2023 with H&H 11.1 and 32.9, white count 5110, platelet count 151,000.  Patient without additional side effect from Hydrea though dose is now reduced to 500 mg every other day.    He was next seen in office 4/12/2024  "after complications following COVID development late last year.  He has been hospitalized several times, and rehab several times and had evidence hemolysis leading to discontinuance of his Hydrea.  He was last seen in mid January stable hematologically.  Unfortunately we did not have additional recommendations as he tried to recover from the infection.        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  Hematologically, fortunately, he remained stable and is off Hydrea.    He is next seen 7/24/2024 reasonably stable hematologically.  At present no intervention is necessary.    Patient evaluated 3/26/2025 not requiring phlebotomy, stable on current Hydrea dosing.  Past Medical History:   Diagnosis Date    Anxiety     Arthritis     Atrial flutter     Status post cavotricuspid isthmus ablation by Dr. Gustafson on 4/18/17    Mandel esophagus     Benign essential hypertension     CAD (coronary artery disease)     3 vessel CABG 4/11/17 by Dr. Cortez: ROSARIO-prox LAD, SVG-OM1, SVG-OM3    Carotid artery disease     Status post carotid endarterectomy - [facility] 4/10/17: 50-59% NICHELLE, 1-15% LICA.     Colonic polyp     COVID-19 long hauler     Cyst of pancreas     DDD (degenerative disc disease), lumbosacral     GERD (gastroesophageal reflux disease)     H/O bone density study 2013    H/O complete eye exam 2014    History of kidney stone     HLD (hyperlipidemia)     Hypertension     Kidney stone     8/22/22    Lipid screening 05/31/2013    Low back pain     physical therapy [facility] rehab 5-12-10    Parkinson disease     Screening for prostate cancer 07/07/2015    Skin cancer     nose    Stroke     RESIDUAL--\"BALANCE ISSUES\"        Past Surgical History:   Procedure Laterality Date    CARDIAC CATHETERIZATION N/A 04/10/2017    Procedure: Left Heart Cath;  Surgeon: Marjorie Healy MD;  Location: [facility]" [facility] CATH INVASIVE LOCATION;  Service:     CARDIAC CATHETERIZATION N/A 04/10/2017    Procedure: Coronary angiography;  Surgeon: Marjorie Healy MD;  Location: [facility] CATH INVASIVE LOCATION;  Service:     CARDIAC CATHETERIZATION N/A 04/10/2017    Procedure: Left ventriculography;  Surgeon: Marjorie Healy MD;  Location: [facility] CATH INVASIVE LOCATION;  Service:     CARDIAC CATHETERIZATION  2011    CARDIAC ELECTROPHYSIOLOGY PROCEDURE N/A 04/18/2017    Procedure: Ablation atrial flutter;  Surgeon: Jose Antonio Gustafson MD;  Location: [facility] CATH INVASIVE LOCATION;  Service:     CAROTID ENDARTERECTOMY      CHOLECYSTECTOMY      CHOLECYSTECTOMY WITH INTRAOPERATIVE CHOLANGIOGRAM N/A 09/07/2019    Procedure: Laparoscopic cholecystectomy with intraoperative cholangiogram;  Surgeon: Gauri Travis MD;  Location: [facility] MAIN OR;  Service: General    COLONOSCOPY  01/06/2015    Diverticulosis, one TA    COLONOSCOPY N/A 02/14/2019    tics, NBIH, adenomatous polyp x 2    COLONOSCOPY N/A 11/05/2021    Procedure: COLONOSCOPY TO CECUM AND TERM. ILEUM WITH COLD POLYPECTOMIES;  Surgeon: Everton Abel MD;  Location: [facility] ENDOSCOPY;  Service: Gastroenterology;  Laterality: N/A;  PRE OP - PERS H/O POLYPS  POST OP - COLON POLYPS,, DIVERTICULOSIS, HEMORRHOIDS    CORONARY ARTERY BYPASS GRAFT N/A 04/11/2017    Procedure: AR STERNOTOMY CORONARY ARTERY BYPASS GRAFT TIMES 3 USING LEFT INTERNAL MAMMARY ARTERY AND LEFT GREATER SAPHENOUS VEIN GRAFT PER ENDOSCOPIC VEIN HARVESTING AND PRP ;  Surgeon: Temo Cortez MD;  Location: [facility] MAIN OR;  Service:     ENDOSCOPY  01/06/2015    HH, Ervin's esophagus    ENDOSCOPY N/A 02/14/2019    Z line irregular, HH, Ervin's esophagus    ENDOSCOPY N/A 11/05/2021    Procedure: ESOPHAGOGASTRODUODENOSCOPY WITH BIOPSIES;  Surgeon: Everton Abel MD;  Location: [facility] ENDOSCOPY;  Service: Gastroenterology;  Laterality: N/A;  PRE OP - PERS H/O ERVIN'S  POST OP - IRREG Z LINE    ENDOSCOPY W/ PEG TUBE  PLACEMENT N/A 11/6/2023    Procedure: ESOPHAGOGASTRODUODENOSCOPY WITH PERCUTANEOUS ENDOSCOPIC GASTROSTOMY TUBE INSERTION;  Surgeon: Temo Ramsey MD;  Location: [facility] ENDOSCOPY;  Service: General;  Laterality: N/A;  Pre: dysphagia  Post: same    KNEE SURGERY Left     URETEROSCOPY LASER LITHOTRIPSY WITH STENT INSERTION Left 8/23/2022    Procedure: Cysto retrograde with left uretro stent placement;  Surgeon: Damien Oliveira MD;  Location: [facility] MAIN OR;  Service: Urology;  Laterality: Left;    VASECTOMY          Current Outpatient Medications on File Prior to Visit   Medication Sig Dispense Refill    allopurinol (ZYLOPRIM) 300 MG tablet TAKE 1 TABLET BY MOUTH DAILY 14 tablet 0    apixaban (ELIQUIS) 2.5 MG tablet tablet Take 1 tablet by mouth Every 12 (Twelve) Hours. Indications: Atrial Fibrillation      carbidopa-levodopa (SINEMET)  MG per tablet Take 1 tablet by mouth 4 (Four) Times a Day for 30 days. 120 tablet 4    entacapone (COMTAN) 200 MG tablet Take 1 tablet by mouth 4 (Four) Times a Day.      HYDROcodone-acetaminophen (NORCO) 5-325 MG per tablet Take 1 tablet by mouth Every 8 (Eight) Hours As Needed for Severe Pain or Moderate Pain. 30 day supply. DNF 3/20/25 90 tablet 0    hydroxyurea (Hydrea) 500 MG capsule Take 1 capsule by mouth Daily. 30 capsule 3    lisinopril (PRINIVIL,ZESTRIL) 5 MG tablet Take 1 tablet by mouth Daily. 30 tablet 11    Nutritional Supplements (OSMOLITE 1.5 GIANNI PO) 240 Units/oz/day by Gastrostomy Tube route 6 (Six) Times a Day.      rosuvastatin (Crestor) 20 MG tablet Take 1 tablet by mouth Every Night. Indications: Cerebrovascular Accident or Stroke 90 tablet 1    doxycycline (VIBRAMYICN) 100 MG tablet Take 1 tablet by mouth 2 (Two) Times a Day. (Patient not taking: Reported on 3/26/2025) 20 tablet 0    RABEprazole (ACIPHEX) 20 MG EC tablet TAKE 1 TABLET BY MOUTH DAILY (Patient not taking: Reported on 3/26/2025) 90 tablet 0     No current facility-administered  "medications on file prior to visit.        ALLERGIES:    Allergies   Allergen Reactions    Atorvastatin Myalgia     Myalgia    Penicillins Hives, Swelling and Rash     Tolerates cephalosporins         Social History     Socioeconomic History    Marital status:      Spouse name: Brooke    Years of education: 9th grade   Tobacco Use    Smoking status: Former     Current packs/day: 0.00     Average packs/day: 1 pack/day for 50.0 years (50.0 ttl pk-yrs)     Types: Cigarettes     Start date: 1959     Quit date: 2009     Years since quittin.2     Passive exposure: Past    Smokeless tobacco: Never    Tobacco comments:     CAFFEINE USE   Vaping Use    Vaping status: Never Used   Substance and Sexual Activity    Alcohol use: Not Currently     Comment: rarely    Drug use: No    Sexual activity: Defer     Partners: Female        Family History   Problem Relation Age of Onset    Hypertension Mother     Heart disease Mother     Heart attack Mother     Stroke Mother     Heart disease Father     Heart attack Father     Stroke Father     Hypertension Sister     Heart attack Brother     Heart disease Brother     No Known Problems Brother     Heart disease Brother     Heart attack Brother     Diabetes Brother     No Known Problems Maternal Grandmother     No Known Problems Maternal Grandfather     No Known Problems Paternal Grandmother     No Known Problems Paternal Grandfather     Pancreatic cancer Paternal Cousin     Arthritis Other     Diabetes Other     Hypertension Other     Kidney disease Other         stones    Malig Hyperthermia Neg Hx         Objective     Vitals:    25 1305   BP: 129/69   Pulse: 62   SpO2: 97%   Weight: 60.4 kg (133 lb 3.2 oz)   Height: 172.7 cm (67.99\")   PainSc: 8    PainLoc: Back  Comment: and legs             3/26/2025     1:07 PM   Current Status   ECOG score 0       Physical Exam  Vitals reviewed.   Constitutional:       Appearance: Normal appearance. He is well-developed.    "   Comments: Using a walker.    HENT:      Head: Normocephalic and atraumatic.   Eyes:      Pupils: Pupils are equal, round, and reactive to light.   Cardiovascular:      Rate and Rhythm: Normal rate.   Pulmonary:      Effort: Pulmonary effort is normal. No respiratory distress.      Breath sounds: Normal breath sounds.   Abdominal:      Palpations: Abdomen is soft.      Comments: G tube in place.   Musculoskeletal:         General: Normal range of motion.      Cervical back: Normal range of motion.   Skin:     General: Skin is warm and dry.      Findings: No rash.   Neurological:      Mental Status: He is alert and oriented to person, place, and time.         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  These indices have been slowly reducing over the last 2 years approximately followed more closely.  Concurrently is a mild drop in his baseline hemoglobin still well within normal limits.  thrombocytosis thought, initially, to be related to iron deficiency.  Ferritin found to be 16.3 and iron of 26 with 5% saturation and TIBC 511.   The patient was placed on ferrous gluconate which, unfortunately, he was unable to tolerate with worsening constipation.  He had further issues in early September with left renal stone, requiring cystoscopy, stent placement 8/23/2022.  9/28/2022 with repeat studies performed 9/21 demonstrating 5% iron saturation, ferritin of 12.5.  He remains further weight and fatigue, again oral iron intolerant and will  require IV iron preparations for his iron deficiency anemia.  We discontinued oral iron and proceeded with Injectafer given 9/28/2022 in 10/5/2022  4/17/2023JAK  2-V617 F mutation having been detected.    In the interval he was admitted 2/19-21/23 for weakness, fall and ER evaluation not demonstrate any acute intracranial process, CT of lumbar spine with lumbar degenerative disease and other studies not show any acute abnormalities.  Fortunately he went on to improve though his wife had a positive COVID test recently on home examination.  4/10/2023 include an H&H of 18.3 and 60.9 white count of 14,700 platelet count of 477,000.  5/15/2023 hemoglobin 15.3, hematocrit 50.4.  Reviewed with Dr. Lopez plans to hold off on phlebotomy and initiate Hydrea 1000 mg daily.  We will tentatively schedule him for return follow-up visit in 2 weeks with repeat labs and reassessment.  6/1/2020 2:23 weeks of Hydrea 1000 mg daily, WBC 5.0, hemoglobin 15.2, hematocrit 49.8, platelets 113,000.  Hydrea was reduced to 500 mg daily  Stability of counts today, 6/15/2023 on 500 mg daily with WBC 4.81, hemoglobin 14.6, hematocrit 46.7%, platelets 156,000  Patient seen 6/30/2023 with H&H of 14.1 and 46.6 white count of 4800 and platelet count of 1 46,000.  Patient subsequently assessed including 9/21/2023 with H&H gradually dropping 11.1 and 32.9, white count of 5110, platelet count 151,000, .8.  10/20/2023 WBC 8.04, SNC 5.42, Hgb 13.1, Platelets 247,000.  Continue Hydrea 500 mg every other day.  11/9/2023-hospitalized with COVID-pneumonia and severe deconditioningdischarged to nursing home on 11/9/2023 off Hydrea but on Eliquis  Evidence of hemolysis in 12/23 with negative PNH workup and very low titer cold agglutinins  CBC stable - hold hydrea  Assessed 4/12/2024-stable hematologically, Hydrea held  7/2024: This assessment despite leukocytosis and thrombocytosis his anemia is not worsening and intervention with treatment such as  Hydrea phlebotomy is not necessary as of yet.  1/21/2025: WBC 33.01, platelets elevated to 837,000 today. Over the last several months, blood counts have continued to increase while hydrea has been held. Reviewed results with Dr. Lopez who advised restarting hydrea 500mg daily.   2/18/2025: WBC 18.32, hemoglobin 15.5, platelets 664,000. Continue hydrea 500mg daily.   Reassessment 3/26/2025 with H&H of 15.7 and 53.5 with white count 11,030 platelet count of 2 55,000, stable on current Hydrea dosing    *Hospitalized in mid October to early November with COVID-pneumonia with profound deconditioning discharged to nursing home  Readmitted 11/22/2023 with anemia-patient has not been on hydroxyurea at discharge from the hospital and remains on Eliquis  MCV is dropped significantly suggesting iron depletion and blood loss (but also may be related to being off Hydrea)  Patient himself denies hematochezia or melena  B12 folate normal ferritin 566 iron saturation 12% reticulocyte count 9% rule out hemolysis although acute blood loss can also cause an elevated reticulocyte  LDH elevated 356 haptoglobin low at 24-suggest hemolysis suggest hemolysis   Crossmatch compatible suggesting indirect claudia neg  Direct claudia Complement +?  Cold agglutinin versus PNH-PNH profile and cold agglutinin titer pending  Hemoglobin stable 11/27/2023-9.2  CT abdomen/pelvis 11/27/2023-suggestive of lower lobe pneumonia, stable pancreatic lesions probable IPMN, no evidence of bleeding  Discharged from rehab on Friday and readmitted with fall and difficulty walking 4 days later-CBC stable  Cold agglutinins 1:32 PNH negative  Patient seen 4/12/24 stable hematologically, Hydrea not reinstituted  Reassessment 7/24/2024 again stable hematologically.      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 Continue on Sinemet.  Recent worsening symptoms on combined therapy  1/3/1/25    PLAN:   Continue current hydrea dosing with 500mg daily   Continue low dose Eliquis per cardiology   Return to clinic 6 weeks, NP, CBC  Instructed to reach out sooner with any concerns.     Patient is on a high risk medication requiring close monitoring for toxicity.       negative

## 2025-03-26 ENCOUNTER — LAB (OUTPATIENT)
Dept: LAB | Facility: HOSPITAL | Age: 80
End: 2025-03-26
Payer: MEDICARE

## 2025-03-26 ENCOUNTER — OFFICE VISIT (OUTPATIENT)
Dept: ONCOLOGY | Facility: CLINIC | Age: 80
End: 2025-03-26
Payer: MEDICARE

## 2025-03-26 VITALS
HEIGHT: 68 IN | BODY MASS INDEX: 20.19 KG/M2 | HEART RATE: 62 BPM | WEIGHT: 133.2 LBS | DIASTOLIC BLOOD PRESSURE: 69 MMHG | OXYGEN SATURATION: 97 % | SYSTOLIC BLOOD PRESSURE: 129 MMHG

## 2025-03-26 DIAGNOSIS — D45 POLYCYTHEMIA VERA: Primary | ICD-10-CM

## 2025-03-26 DIAGNOSIS — D45 POLYCYTHEMIA VERA: ICD-10-CM

## 2025-03-26 LAB
BASOPHILS # BLD AUTO: 0.07 10*3/MM3 (ref 0–0.2)
BASOPHILS NFR BLD AUTO: 0.6 % (ref 0–1.5)
DEPRECATED RDW RBC AUTO: 86 FL (ref 37–54)
EOSINOPHIL # BLD AUTO: 0.15 10*3/MM3 (ref 0–0.4)
EOSINOPHIL NFR BLD AUTO: 1.3 % (ref 0.3–6.2)
ERYTHROCYTE [DISTWIDTH] IN BLOOD BY AUTOMATED COUNT: 30.5 % (ref 12.3–15.4)
HCT VFR BLD AUTO: 53.5 % (ref 37.5–51)
HGB BLD-MCNC: 15.7 G/DL (ref 13–17.7)
IMM GRANULOCYTES # BLD AUTO: 0.14 10*3/MM3 (ref 0–0.05)
IMM GRANULOCYTES NFR BLD AUTO: 1.2 % (ref 0–0.5)
LYMPHOCYTES # BLD AUTO: 1.12 10*3/MM3 (ref 0.7–3.1)
LYMPHOCYTES NFR BLD AUTO: 9.5 % (ref 19.6–45.3)
MCH RBC QN AUTO: 23.9 PG (ref 26.6–33)
MCHC RBC AUTO-ENTMCNC: 29.3 G/DL (ref 31.5–35.7)
MCV RBC AUTO: 81.6 FL (ref 79–97)
MONOCYTES # BLD AUTO: 0.57 10*3/MM3 (ref 0.1–0.9)
MONOCYTES NFR BLD AUTO: 4.8 % (ref 5–12)
NEUTROPHILS NFR BLD AUTO: 82.6 % (ref 42.7–76)
NEUTROPHILS NFR BLD AUTO: 9.78 10*3/MM3 (ref 1.7–7)
NRBC BLD AUTO-RTO: 0 /100 WBC (ref 0–0.2)
PLATELET # BLD AUTO: 253 10*3/MM3 (ref 140–450)
PMV BLD AUTO: 9.8 FL (ref 6–12)
RBC # BLD AUTO: 6.56 10*6/MM3 (ref 4.14–5.8)
WBC NRBC COR # BLD AUTO: 11.83 10*3/MM3 (ref 3.4–10.8)

## 2025-03-26 PROCEDURE — 1125F AMNT PAIN NOTED PAIN PRSNT: CPT | Performed by: INTERNAL MEDICINE

## 2025-03-26 PROCEDURE — 3074F SYST BP LT 130 MM HG: CPT | Performed by: INTERNAL MEDICINE

## 2025-03-26 PROCEDURE — 36415 COLL VENOUS BLD VENIPUNCTURE: CPT

## 2025-03-26 PROCEDURE — 85025 COMPLETE CBC W/AUTO DIFF WBC: CPT

## 2025-03-26 PROCEDURE — 3078F DIAST BP <80 MM HG: CPT | Performed by: INTERNAL MEDICINE

## 2025-03-26 PROCEDURE — 99213 OFFICE O/P EST LOW 20 MIN: CPT | Performed by: INTERNAL MEDICINE

## 2025-03-27 ENCOUNTER — TREATMENT (OUTPATIENT)
Dept: PHYSICAL THERAPY | Facility: CLINIC | Age: 80
End: 2025-03-27
Payer: MEDICARE

## 2025-03-27 DIAGNOSIS — R26.89 BALANCE DISORDER: ICD-10-CM

## 2025-03-27 DIAGNOSIS — R29.898 DECREASED STRENGTH OF LOWER EXTREMITY: Primary | ICD-10-CM

## 2025-03-27 DIAGNOSIS — R26.9 GAIT DISTURBANCE: ICD-10-CM

## 2025-03-27 PROCEDURE — 97110 THERAPEUTIC EXERCISES: CPT

## 2025-03-27 PROCEDURE — 97530 THERAPEUTIC ACTIVITIES: CPT

## 2025-03-27 NOTE — PROGRESS NOTES
Physical Therapy Daily Treatment Note  [facility] [place]  [address], [address]  [place] [address]     Patient: Madhu Santoro  : 1945  Referring practitioner: Faith Alcocer APRN  Today's Date: 3/27/2025    VISIT#: 7    Visit Diagnoses:    ICD-10-CM ICD-9-CM   1. Decreased strength of lower extremity  R29.898 729.89   2. Gait disturbance  R26.9 781.2   3. Balance disorder  R26.89 781.99       Subjective   Madhu Santoro reports: that he has seen some improvements getting in and out of the car.       Objective       See Exercise, Manual, and Modality Logs for complete treatment.     Patient Education: HEP review  Exercise rationale/ pain free exercise performance  Alternate exercise positions  Verbal/Tactile cues to ensure correct exercise performance/technique       Assessment/Plan  Patient demonstrates good tolerance to continued and new therapeutic exercises and activities on this date with general LE fatigue reported throughout. Focused on gait with a SPC, CGA, and a gait belt donned throughout. Focused on taking big steps, working on increased stride length. Balance throughout was good with a few episodes of festering gait especially with turning and when dual tasks were incorporated with a cognitive aspect. Progressed from supine to a seated position. Will continue to progress as tolerated.      Progress per Plan of Care and Progress strengthening /stabilization /functional activity          Timed:         Manual Therapy:    -     mins  88970;     Therapeutic Exercise:    8     mins  81097;     Neuromuscular Ankush:    -    mins  92708;    Therapeutic Activity:     27     mins  85699;     Gait Training:           mins  27051;     Ultrasound:          mins  14396;    Ionto:                                   mins  55407  Self Care:                       -     mins  18572    Un-Timed:  Electrical Stimulation:         mins  82137 ( );  Dry Needling           mins self-pay  Traction          mins 00012  Re-Eval                               mins  33824  Group Therapy           ___ mins [id number]    Timed Treatment:   35   mins   Total Treatment:     55   mins    Mylene Hernandez PT  Physical Therapist  [place] REECE license #: [id number]

## 2025-04-01 ENCOUNTER — TREATMENT (OUTPATIENT)
Dept: PHYSICAL THERAPY | Facility: CLINIC | Age: 80
End: 2025-04-01
Payer: MEDICARE

## 2025-04-01 DIAGNOSIS — R26.9 GAIT DISTURBANCE: ICD-10-CM

## 2025-04-01 DIAGNOSIS — R29.898 DECREASED STRENGTH OF LOWER EXTREMITY: Primary | ICD-10-CM

## 2025-04-01 DIAGNOSIS — R26.89 BALANCE DISORDER: ICD-10-CM

## 2025-04-01 PROCEDURE — 97110 THERAPEUTIC EXERCISES: CPT

## 2025-04-01 PROCEDURE — 97530 THERAPEUTIC ACTIVITIES: CPT

## 2025-04-01 PROCEDURE — 97112 NEUROMUSCULAR REEDUCATION: CPT

## 2025-04-01 NOTE — PROGRESS NOTES
Physical Therapy Daily Treatment Note  [facility] Therapy [place]  [address], [address]  [place] [address]     Patient: Madhu Santoro  : 1945  Referring practitioner: Faith Alcocer APRN  Today's Date: 2025    VISIT#: 8    Visit Diagnoses:    ICD-10-CM ICD-9-CM   1. Decreased strength of lower extremity  R29.898 729.89   2. Gait disturbance  R26.9 781.2   3. Balance disorder  R26.89 781.99       Subjective   Madhu Santoro reports: that he was pretty worn out after last visit. He is hoping to try some strengthening machines today as well.       Objective       See Exercise, Manual, and Modality Logs for complete treatment.     Patient Education: HEP review  Exercise rationale/ pain free exercise performance  Alternate exercise positions  Verbal/Tactile cues to ensure correct exercise performance/technique       Assessment/Plan  Patient continues to demonstrate good tolerance to continued therapeutic interventions on this date. Continued to focus on seated LE strengthening exercises and endurance with functional activities. He is able to ambulate one lab with a SPC to target increased stride length. However, with no AD and CGA he is able to ambulate with increased jemal and increased stride length. With no AD during turning he is able to maintain his jemal and stride length with no festering gait noted on this date. Introduced static balance, stairs, and leg press. Will continue to progress as tolerated.       Progress per Plan of Care and Progress strengthening /stabilization /functional activity          Timed:         Manual Therapy:    -     mins  06466;     Therapeutic Exercise:    15     mins  26590;     Neuromuscular Ankush:    10    mins  69172;    Therapeutic Activity:     35     mins  26773;     Gait Training:           mins  69121;     Ultrasound:          mins  50297;    Ionto:                                   mins  70106  Self Care:                        -     mins  52286    Un-Timed:  Electrical Stimulation:         mins  81281 ( );  Dry Needling          mins self-pay  Traction          mins 39842  Re-Eval                               mins  24265  Group Therapy           ___ mins 67991    Timed Treatment:   60   mins   Total Treatment:     65   mins    Mylene Hernandez PT  Physical Therapist  [facility] license #: [id number]

## 2025-04-03 ENCOUNTER — TREATMENT (OUTPATIENT)
Dept: PHYSICAL THERAPY | Facility: CLINIC | Age: 80
End: 2025-04-03
Payer: MEDICARE

## 2025-04-03 DIAGNOSIS — R26.9 GAIT DISTURBANCE: ICD-10-CM

## 2025-04-03 DIAGNOSIS — R26.89 BALANCE DISORDER: ICD-10-CM

## 2025-04-03 DIAGNOSIS — R29.898 DECREASED STRENGTH OF LOWER EXTREMITY: Primary | ICD-10-CM

## 2025-04-03 PROCEDURE — 97110 THERAPEUTIC EXERCISES: CPT

## 2025-04-03 PROCEDURE — 97530 THERAPEUTIC ACTIVITIES: CPT

## 2025-04-03 RX ORDER — LISINOPRIL 5 MG/1
5 TABLET ORAL DAILY
Qty: 180 TABLET | Refills: 0 | Status: SHIPPED | OUTPATIENT
Start: 2025-04-03

## 2025-04-03 NOTE — PROGRESS NOTES
Physical Therapy Daily Treatment Note  [facility] Physical Therapy [place]  [address], [address]  [place] [address]     Patient: Madhu Santoro  : 1945  Referring practitioner: Faith Alcocer APRN  Today's Date: 4/3/2025    VISIT#: 9    Visit Diagnoses:    ICD-10-CM ICD-9-CM   1. Decreased strength of lower extremity  R29.898 729.89   2. Gait disturbance  R26.9 781.2   3. Balance disorder  R26.89 781.99       Subjective   Madhu Santoro reports: that he was pretty worn out after last visit but was a good level of worn out. The leaking from his feeding tube has stopped and he has contacted his nurse.       Objective       See Exercise, Manual, and Modality Logs for complete treatment.     Patient Education: HEP review  Exercise rationale/ pain free exercise performance  Alternate exercise positions  Verbal/Tactile cues to ensure correct exercise performance/technique       Assessment/Plan  Patient demonstrates good tolerance to continued therapeutic exercises on this date with general fatigue reported throughout, several seated rest breaks were required throughout. He was able to ambulate 5 laps on this date with no AD and CGA with no LOB and no festering gait. Added in some tight turns on this date with no festering gait. He is progressing well and making good improvements. Progressed tandem stance to tandem stance on the airex pad with CGA. Will continue to progress as tolerated.       Progress per Plan of Care and Progress strengthening /stabilization /functional activity          Timed:         Manual Therapy:    -     mins  81641;     Therapeutic Exercise:    8     mins  89608;     Neuromuscular Ankush:    8    mins  63399;    Therapeutic Activity:     14     mins  86323;     Gait Training:           mins  88311;     Ultrasound:          mins  34700;    Ionto:                                   mins  88956  Self Care:                       -     mins  62374    Un-Timed:  Electrical  Stimulation:         mins  66451 ( );  Dry Needling          mins self-pay  Traction          mins 25650  Re-Eval                               mins  38770  Group Therapy           ___ mins 32158    Timed Treatment:   30   mins   Total Treatment:     63   mins    Mylene Hernandez PT  Physical Therapist  [place] [facility] license #: [id number]

## 2025-04-08 ENCOUNTER — TREATMENT (OUTPATIENT)
Dept: PHYSICAL THERAPY | Facility: CLINIC | Age: 80
End: 2025-04-08
Payer: MEDICARE

## 2025-04-08 DIAGNOSIS — R26.89 BALANCE DISORDER: ICD-10-CM

## 2025-04-08 DIAGNOSIS — R29.898 DECREASED STRENGTH OF LOWER EXTREMITY: Primary | ICD-10-CM

## 2025-04-08 DIAGNOSIS — R26.9 GAIT DISTURBANCE: ICD-10-CM

## 2025-04-08 PROCEDURE — 97110 THERAPEUTIC EXERCISES: CPT

## 2025-04-08 PROCEDURE — 97112 NEUROMUSCULAR REEDUCATION: CPT

## 2025-04-08 PROCEDURE — 97530 THERAPEUTIC ACTIVITIES: CPT

## 2025-04-08 NOTE — PROGRESS NOTES
Physical Therapy Daily Treatment Note  [facility] Physical Therapy [place]  [address], [address]  [place] [address]     Patient: Madhu Santoro  : 1945  Referring practitioner: Faith Alcocer APRN  Today's Date: 2025    VISIT#: 10    Visit Diagnoses:    ICD-10-CM ICD-9-CM   1. Decreased strength of lower extremity  R29.898 729.89   2. Gait disturbance  R26.9 781.2   3. Balance disorder  R26.89 781.99       Subjective   Madhu Santoro reports:   Doing well overall, more confidence with walking in general.  Forgets about his walker at times.       Objective   See Exercise, Manual, and Modality Logs for complete treatment.     Patient Education:   HEP review  Exercise rationale/ pain free exercise performance  Alternate exercise positions  Verbal/Tactile cues to ensure correct exercise performance/technique       Assessment/Plan  Patient demonstrates good tolerance to continued therapeutic exercises on this date with general fatigue reported throughout.  Progressed seated exercise resistance to 2# weights, leg press to 80#.  Progressed with KB seated deadlifts and swings with STS transfer.       Progress per Plan of Care and Progress strengthening /stabilization /functional activity          Timed:         Manual Therapy:    -     mins  84158;     Therapeutic Exercise:    15     mins  11564;     Neuromuscular Ankush:   10    mins  85349;    Therapeutic Activity:     25     mins  20718;     Gait Trainin     mins  93685;     Ultrasound:          mins  66743;    Ionto:                                   mins  06175  Self Care:                       -     mins  24586    Un-Timed:  Electrical Stimulation:         mins  52540 (MC );  Dry Needling          mins self-pay  Traction          mins 20584  Re-Eval                               mins  36093  Group Therapy           ___ mins 81590    Timed Treatment:   55   mins   Total Treatment:     55   mins      Shane Rivero  FR[place]  [place] License #[id number]  Physical Therapist Assistant

## 2025-04-09 ENCOUNTER — TELEPHONE (OUTPATIENT)
Dept: SURGERY | Facility: CLINIC | Age: 80
End: 2025-04-09
Payer: MEDICARE

## 2025-04-09 NOTE — TELEPHONE ENCOUNTER
Patients spouse called and left a voicemail stating the patients feeding tube that was placed in 2023 is now leaking and they want it removed.     Returned call to patient and he stated the tube leaks at the top where he inserts the food, stated it looks like a little pinhole at the top of the tube. He stated he eats about two times a day on his own without the tube, the last time he used the feeding tube was yesterday. He is requesting to have the tube removed.

## 2025-04-09 NOTE — TELEPHONE ENCOUNTER
Called and spoke with patient to get him scheduled to see  in office on 4/10/25 to evaluate the tube and informed him that  does not want him to use the tube until he is able to evaluate it in office. Patient verbalized understanding.

## 2025-04-10 ENCOUNTER — OFFICE VISIT (OUTPATIENT)
Dept: SURGERY | Facility: CLINIC | Age: 80
End: 2025-04-10
Payer: MEDICARE

## 2025-04-10 ENCOUNTER — TREATMENT (OUTPATIENT)
Dept: PHYSICAL THERAPY | Facility: CLINIC | Age: 80
End: 2025-04-10
Payer: MEDICARE

## 2025-04-10 VITALS
SYSTOLIC BLOOD PRESSURE: 120 MMHG | WEIGHT: 132.2 LBS | HEIGHT: 68 IN | OXYGEN SATURATION: 100 % | BODY MASS INDEX: 20.03 KG/M2 | DIASTOLIC BLOOD PRESSURE: 72 MMHG

## 2025-04-10 DIAGNOSIS — R26.9 GAIT DISTURBANCE: ICD-10-CM

## 2025-04-10 DIAGNOSIS — R26.89 BALANCE DISORDER: ICD-10-CM

## 2025-04-10 DIAGNOSIS — K94.23 MALFUNCTION OF GASTROSTOMY TUBE: Primary | ICD-10-CM

## 2025-04-10 DIAGNOSIS — R29.898 DECREASED STRENGTH OF LOWER EXTREMITY: Primary | ICD-10-CM

## 2025-04-10 PROCEDURE — 97110 THERAPEUTIC EXERCISES: CPT

## 2025-04-10 PROCEDURE — 97530 THERAPEUTIC ACTIVITIES: CPT

## 2025-04-10 RX ORDER — GABAPENTIN 100 MG/1
100 CAPSULE ORAL 2 TIMES DAILY
COMMUNITY

## 2025-04-10 NOTE — PROGRESS NOTES
ASSESSMENT/PLAN:    79-year-old gentleman with a percutaneous endoscopic gastrostomy tube which has become painful and bothersome.  He is only the using the tube at this point because it is present.  He is eating 3 meals per day and supplementing 1 carton of tube feed per day because they continue to be delivered to the house.  He feels like he can drink boost or Ensure just as well.  He will occasionally use the tube for medications mainly because the tube is available.  I discussed 2-week trial of leaving the tube in place and not using it versus removal of the tube today.  He desired to proceed with removal of the tube in the office.  It was removed with gentle traction and the site was covered with a dry dressing.  He can remove the outer dressing in 48 hours and then cover the site as needed with gauze or bandage for drainage.  If drainage is persistent at 2 weeks after removal of the tube then he should call us and let us know so that he can be reevaluated.  If his oral intake starts to decline and he cannot support himself nutritionally then we can likely replace the tube in endoscopy.     CC:     Chief Complaint   Patient presents with    G-tube malfunction        HPI:    79-year-old gentleman presenting for evaluation of a leaking G-tube.  It leaks whenever they tried to inject anything down the gastrostomy tube.  Currently leaking from the area around the manifold where it connects into the tubing.  Tube was placed in 2023 by Dr. Ramsey.  He and his wife reports that his oral intake has improved and he is eating over 75% of 3 meals per day.  His weight has remained stable.  They are giving him 1 carton of tube feeds down the tube each day.  This is primarily because the G-tube remains in place and may continue to be supplied with tube feeds.  He will intermittently use it for medications because it is available but not because he feels like he needs it.      SOCIAL HISTORY:   Social Drivers of Health      Tobacco Use: Medium Risk (3/26/2025)    Patient History     Smoking Tobacco Use: Former     Smokeless Tobacco Use: Never     Passive Exposure: Past   Alcohol Use: Not At Risk (1/28/2025)    AUDIT-C     Frequency of Alcohol Consumption: Monthly or less     Average Number of Drinks: 1 or 2     Frequency of Binge Drinking: Less than monthly   Financial Resource Strain: Not on file   Food Insecurity: No Food Insecurity (1/8/2024)    Hunger Vital Sign     Worried About Running Out of Food in the Last Year: Never true     Ran Out of Food in the Last Year: Never true   Transportation Needs: No Transportation Needs (3/14/2024)    OASIS : Transportation     Lack of Transportation (Medical): No     Lack of Transportation (Non-Medical): No     Patient Unable or Declines to Respond: No   Physical Activity: Not on file   Stress: Not on file   Social Connections: Feeling Socially Integrated (3/14/2024)    OASIS : Social Isolation     Frequency of experiencing loneliness or isolation: Never   Interpersonal Safety: Not At Risk (3/26/2025)    Abuse Screen     Unsafe at Home or Work/School: no     Feels Threatened by Someone?: no     Does Anyone Keep You from Contacting Others or Doint Things Outside the Home?: no     Physical Sign of Abuse Present: no   Depression: Not at risk (3/17/2025)    PHQ-2     PHQ-2 Score: 0   Recent Concern: Depression - At risk (12/20/2024)    PHQ-2     PHQ-2 Score: 8   Housing Stability: Unknown (1/30/2025)    Housing Stability     Current Living Arrangements: home     Potentially Unsafe Housing Conditions: Not on file   Utilities: Not on file   Health Literacy: Unknown (1/30/2025)    Education     Help with school or training?: Not on file     Preferred Language: English   Employment: Not on file   Disabilities: At Risk (1/28/2025)    Disabilities     Concentrating, Remembering, or Making Decisions Difficulty: no     Doing Errands Independently Difficulty: yes        FAMILY HISTORY:     Family History   Problem Relation Age of Onset    Hypertension Mother     Heart disease Mother     Heart attack Mother     Stroke Mother     Heart disease Father     Heart attack Father     Stroke Father     Hypertension Sister     Heart attack Brother     Heart disease Brother     No Known Problems Brother     Heart disease Brother     Heart attack Brother     Diabetes Brother     No Known Problems Maternal Grandmother     No Known Problems Maternal Grandfather     No Known Problems Paternal Grandmother     No Known Problems Paternal Grandfather     Pancreatic cancer Paternal Cousin     Arthritis Other     Diabetes Other     Hypertension Other     Kidney disease Other         stones    Malig Hyperthermia Neg Hx         OTHER SURGERY:  Past Surgical History:   Procedure Laterality Date    CARDIAC CATHETERIZATION N/A 04/10/2017    Procedure: Left Heart Cath;  Surgeon: Marjorie Healy MD;  Location:  [facility] CATH INVASIVE LOCATION;  Service:     CARDIAC CATHETERIZATION N/A 04/10/2017    Procedure: Coronary angiography;  Surgeon: Marjorie Healy MD;  Location:  [facility] C[facility] INVASIVE LOCATION;  Service:     CARDIAC CATHETERIZATION N/A 04/10/2017    Procedure: Left ventriculography;  Surgeon: Marjorie Healy MD;  [facility];  Service:     CARDIAC CATHETERIZATION  2011    CARDIAC ELECTROPHYSIOLOGY PROCEDURE N/A 04/18/2017    Procedure: Ablation atrial flutter;  Surgeon: Jose Antonio Gustafson MD;  Location: [facility] CATH INVASIVE LOCATION;  Service:     CAROTID ENDARTERECTOMY      CHOLECYSTECTOMY      CHOLECYSTECTOMY WITH INTRAOPERATIVE CHOLANGIOGRAM N/A 09/07/2019    Procedure: Laparoscopic cholecystectomy with intraoperative cholangiogram;  Surgeon: Gauri Travis MD;  Location: [facility] MAIN OR;  Service: General    COLONOSCOPY  01/06/2015    Diverticulosis, one TA    COLONOSCOPY N/A 02/14/2019    tics, NBIH, adenomatous polyp x 2    COLONOSCOPY N/A 11/05/2021    Procedure: COLONOSCOPY TO CECUM AND  TERM. ILEUM WITH COLD POLYPECTOMIES;  Surgeon: Everton Abel MD;  [facility];  Service: Gastroenterology;  Laterality: N/A;  PRE OP - PERS H/O POLYPS  POST OP - COLON POLYPS,, DIVERTICULOSIS, HEMORRHOIDS    CORONARY ARTERY BYPASS GRAFT N/A 04/11/2017    Procedure: AR STERNOTOMY CORONARY ARTERY BYPASS GRAFT TIMES 3 USING LEFT INTERNAL MAMMARY ARTERY AND LEFT GREATER SAPHENOUS VEIN GRAFT PER ENDOSCOPIC VEIN HARVESTING AND PRP ;  Surgeon: Temo Cortez MD;  Location: [facility] MAIN OR;  Service:     ENDOSCOPY  01/06/2015    HH, Ervin's esophagus    ENDOSCOPY N/A 02/14/2019    Z line irregular, HH, Ervin's esophagus    ENDOSCOPY N/A 11/05/2021    Procedure: ESOPHAGOGASTRODUODENOSCOPY WITH BIOPSIES;  Surgeon: Everton Abel MD;  [facility];  Service: Gastroenterology;  Laterality: N/A;  PRE OP - PERS H/O ERVIN'S  POST OP - IRREG Z LINE    ENDOSCOPY W/ PEG TUBE PLACEMENT N/A 11/6/2023    Procedure: ESOPHAGOGASTRODUODENOSCOPY WITH PERCUTANEOUS ENDOSCOPIC GASTROSTOMY TUBE INSERTION;  Surgeon: Temo Ramsey MD;  Location: [facility] ENDOSCOPY;  Service: General;  Laterality: N/A;  Pre: dysphagia  Post: same    KNEE SURGERY Left     URETEROSCOPY LASER LITHOTRIPSY WITH STENT INSERTION Left 8/23/2022    Procedure: Cysto retrograde with left uretro stent placement;  Surgeon: Damien Oliveira MD;  Location: [facility] MAIN OR;  Service: Urology;  Laterality: Left;    VASECTOMY          PAST MEDICAL HISTORY:    Past Medical History:   Diagnosis Date    Anxiety     Arthritis     Atrial flutter     Status post cavotricuspid isthmus ablation by Dr. Gustafson on 4/18/17    Ervin esophagus     Benign essential hypertension     CAD (coronary artery disease)     3 vessel CABG 4/11/17 by Dr. Cortez: ROSARIO-prox LAD, SVG-OM1, SVG-OM3    Carotid artery disease     Status post carotid endarterectomy - USG 4/10/17: 50-59% NICHELLE, 1-15% LICA.     Colonic polyp     COVID-19 long hauler     Cyst  "of pancreas     DDD (degenerative disc disease), lumbosacral     GERD (gastroesophageal reflux disease)     H/O bone density study 2013    H/O complete eye exam 2014    History of kidney stone     HLD (hyperlipidemia)     Hypertension     Kidney stone     8/22/22    Lipid screening 05/31/2013    Low back pain     [facility]    Parkinson disease     Screening for prostate cancer 07/07/2015    Skin cancer     nose    Stroke     RESIDUAL--\"BALANCE ISSUES\"        MEDICATIONS:   Current Outpatient Medications on File Prior to Visit   Medication Sig Dispense Refill    allopurinol (ZYLOPRIM) 300 MG tablet TAKE 1 TABLET BY MOUTH DAILY 14 tablet 0    apixaban (ELIQUIS) 2.5 MG tablet tablet Take 1 tablet by mouth Every 12 (Twelve) Hours. Indications: Atrial Fibrillation      carbidopa-levodopa (SINEMET)  MG per tablet Take 1 tablet by mouth 4 (Four) Times a Day for 30 days. 120 tablet 4    gabapentin (NEURONTIN) 100 MG capsule Take 1 capsule by mouth 2 (Two) Times a Day.      HYDROcodone-acetaminophen (NORCO) 5-325 MG per tablet Take 1 tablet by mouth Every 8 (Eight) Hours As Needed for Severe Pain or Moderate Pain. 30 day supply. DNF 3/20/25 90 tablet 0    hydroxyurea (Hydrea) 500 MG capsule Take 1 capsule by mouth Daily. 30 capsule 3    lisinopril (PRINIVIL,ZESTRIL) 5 MG tablet TAKE ONE TABLET BY MOUTH DAILY 180 tablet 0    Nutritional Supplements (OSMOLITE 1.5 GIANNI PO) 240 Units/oz/day by Gastrostomy Tube route 6 (Six) Times a Day.      rosuvastatin (Crestor) 20 MG tablet Take 1 tablet by mouth Every Night. Indications: Cerebrovascular Accident or Stroke 90 tablet 1    [DISCONTINUED] doxycycline (VIBRAMYICN) 100 MG tablet Take 1 tablet by mouth 2 (Two) Times a Day. (Patient not taking: Reported on 3/20/2025) 20 tablet 0    [DISCONTINUED] entacapone (COMTAN) 200 MG tablet Take 1 tablet by mouth 4 (Four) Times a Day. (Patient not taking: Reported on 4/10/2025)      [DISCONTINUED] RABEprazole " "(ACIPHEX) 20 MG EC tablet TAKE 1 TABLET BY MOUTH DAILY (Patient not taking: Reported on 3/20/2025) 90 tablet 0     No current facility-administered medications on file prior to visit.        ALLERGIES:   Allergies   Allergen Reactions    Atorvastatin Myalgia     Myalgia    Penicillins Hives, Swelling and Rash     Tolerates cephalosporins     Beta lactam allergy details  Antibiotic reaction: hives, rash, other (swelling)  Age at reaction: unknown  Dose to reaction time: unknown  Reason for antibiotic: unknown  Epinephrine required for reaction?: unknown  Tolerated antibiotics: unknown           Vitals:    04/10/25 1028   BP: 120/72   SpO2: 100%     Body mass index is 20.11 kg/m².  172.7 cm (67.99\")  60 kg (132 lb 3.2 oz)    PHYSICAL EXAM:   Constitutional: Well-developed, well-nourished, no acute distress  Gastrointestinal: soft, gastrostomy tube at 2 cm from the skin        Jose Grijalva M.D.  General and Endoscopic Surgery  [facility]    [address], [address]  [place], [place], [address]  P: [phone]  F: [phone]     "

## 2025-04-10 NOTE — PROGRESS NOTES
Physical Therapy Daily Treatment Note  [facility] Physical Therapy [place]  [address], [address]  [place] [address]     Patient: Madhu Santoro  : 1945  Referring practitioner: Faith Alcocer APRN  Today's Date: 4/10/2025    VISIT#: 11    Visit Diagnoses:    ICD-10-CM ICD-9-CM   1. Decreased strength of lower extremity  R29.898 729.89   2. Gait disturbance  R26.9 781.2   3. Balance disorder  R26.89 781.99       Subjective   Madhu Santoro reports: that he had the feeding tube removed earlier today, they said to take it easy in PT to allow for proper healing. He said no lifting or strenuous activities over the next two weeks.       Objective       See Exercise, Manual, and Modality Logs for complete treatment.     Patient Education: HEP review  Exercise rationale/ pain free exercise performance  Alternate exercise positions  Verbal/Tactile cues to ensure correct exercise performance/technique       Assessment/Plan  Patient demonstrates good tolerance to continued therapeutic interventions on this date with no report of pain in the abdomen. Deferred most activities on this date due to having a procedure prior to therapy on this date to remove the feeding tube. Focused on endurance and walking and seated LE strengthening exercises. He tolerated these well and continues to demonstrate good improvements in endurance and tolerance to functional activities. Discussed making sure he is taking in enough calories especially as we continue to progress challenge in PT. Discussed different protein shake options and provided him with a list of some different ones. Will continue to progress as tolerated (slight modifications over the next two weeks ().       Progress per Plan of Care and Progress strengthening /stabilization /functional activity          Timed:         Manual Therapy:    -     mins  03335;     Therapeutic Exercise:    30     mins  96064;     Neuromuscular Ankush:    -    mins   14043;    Therapeutic Activity:     16     mins  58364;     Gait Training:           mins  50128;     Ultrasound:          mins  46875;    Ionto:                                   mins  39720  Self Care:                       -     mins  43421    Un-Timed:  Electrical Stimulation:         mins  47227 ( );  Dry Needling          mins self-pay  Traction          mins 61561  Re-Eval                               mins  76005  Group Therapy           ___ mins 41462    Timed Treatment:   46   mins   Total Treatment:     46   mins    Mylene Hernandez PT  Physical Therapist  [place] [facility] license #: [id number]

## 2025-04-21 DIAGNOSIS — E78.5 HYPERLIPIDEMIA, UNSPECIFIED HYPERLIPIDEMIA TYPE: Chronic | ICD-10-CM

## 2025-04-21 RX ORDER — ROSUVASTATIN CALCIUM 20 MG/1
20 TABLET, COATED ORAL
Qty: 90 TABLET | Refills: 0 | Status: SHIPPED | OUTPATIENT
Start: 2025-04-21

## 2025-04-22 ENCOUNTER — TREATMENT (OUTPATIENT)
Dept: PHYSICAL THERAPY | Facility: CLINIC | Age: 80
End: 2025-04-22
Payer: MEDICARE

## 2025-04-22 DIAGNOSIS — R29.898 DECREASED STRENGTH OF LOWER EXTREMITY: Primary | ICD-10-CM

## 2025-04-22 DIAGNOSIS — R26.89 BALANCE DISORDER: ICD-10-CM

## 2025-04-22 DIAGNOSIS — R26.9 GAIT DISTURBANCE: ICD-10-CM

## 2025-04-22 PROCEDURE — 97110 THERAPEUTIC EXERCISES: CPT

## 2025-04-22 PROCEDURE — 97112 NEUROMUSCULAR REEDUCATION: CPT

## 2025-04-22 PROCEDURE — 97530 THERAPEUTIC ACTIVITIES: CPT

## 2025-04-22 NOTE — PROGRESS NOTES
Physical Therapy Daily Treatment Note  [facility] Physical Therapy [place]  [address], [address]  [place] [address]     Patient: Madhu Santoro  : 1945  Referring practitioner: Faith Alcocer APRN  Today's Date: 2025    VISIT#: 12    Visit Diagnoses:    ICD-10-CM ICD-9-CM   1. Decreased strength of lower extremity  R29.898 729.89   2. Gait disturbance  R26.9 781.2   3. Balance disorder  R26.89 781.99       Subjective   Madhu Santoro reports: that he has tried to get away from relying on the walker and using the cane more around the house but he has had three falls within the last week at home. He reports no injuries or pain after the falls.        Objective       See Exercise, Manual, and Modality Logs for complete treatment.     Patient Education: HEP review  Exercise rationale/ pain free exercise performance  Alternate exercise positions  Verbal/Tactile cues to ensure correct exercise performance/technique       Assessment/Plan  Patient demonstrates good tolerance to continued therapeutic interventions on this date with no report of pain in the abdomen. Continued to defer most activities on this date due to having a procedure less than two weeks ago to remove the feeding tube. Focused on endurance and walking and seated LE strengthening exercises and standing static balance. Verbal cues were provided for upright posture, to avoid looking down at his feet and shoulders retracted and depressed to avoid forward shift of COM. He tolerated these well and continues to demonstrate good improvements in endurance and tolerance to functional activities.  Will continue to progress as tolerated.       Progress per Plan of Care and Progress strengthening /stabilization /functional activity          Timed:         Manual Therapy:    -     mins  00738;     Therapeutic Exercise:    16     mins  08599;     Neuromuscular Ankush:    10    mins  81871;    Therapeutic Activity:     30     mins   53431;     Gait Training:           mins  98654;     Ultrasound:          mins  02091;    Ionto:                                   mins  97805  Self Care:                       -     mins  22544    Un-Timed:  Electrical Stimulation:         mins  55227 ( );  Dry Needling          mins self-pay  Traction          mins 80655  Re-Eval                               mins  08163  Group Therapy           ___ mins 55072    Timed Treatment:   56   mins   Total Treatment:     56   mins    Mylene Hernandez PT  Physical Therapist  [facility] license #: [id number]

## 2025-05-01 ENCOUNTER — TREATMENT (OUTPATIENT)
Dept: PHYSICAL THERAPY | Facility: CLINIC | Age: 80
End: 2025-05-01
Payer: MEDICARE

## 2025-05-01 DIAGNOSIS — R26.89 BALANCE DISORDER: ICD-10-CM

## 2025-05-01 DIAGNOSIS — R26.9 GAIT DISTURBANCE: ICD-10-CM

## 2025-05-01 DIAGNOSIS — R29.898 DECREASED STRENGTH OF LOWER EXTREMITY: Primary | ICD-10-CM

## 2025-05-01 PROCEDURE — 97530 THERAPEUTIC ACTIVITIES: CPT

## 2025-05-01 PROCEDURE — 97112 NEUROMUSCULAR REEDUCATION: CPT

## 2025-05-01 PROCEDURE — 97110 THERAPEUTIC EXERCISES: CPT

## 2025-05-01 NOTE — SIGNIFICANT NOTE
01/31/25 1517   OTHER   Discipline physical therapist   Rehab Time/Intention   Session Not Performed other (see comments)  (Patient declined participation in PT reporting he has already been up with OT. PT will f/u. Patient plans SNF on Sunday.)   Recommendation   PT - Next Appointment 02/03/25        Please take all medications as prescribed. Please follow up with your primary care physician by calling today for the first available appointment. If you do not have a primary care physician, please contact a physician or clinic listed below today to establish care. Please return to the emergency department sooner if you develop uncontrolled fevers, uncontrolled  vomiting, or any other concerns.

## 2025-05-01 NOTE — PROGRESS NOTES
Physical Therapy Daily Treatment Note  [facility] [place]  [address], [address]  [place] [address]     Patient: Madhu Santoro  : 1945  Referring practitioner: Faith Alcocer APRN  Today's Date: 2025    VISIT#: 13    Visit Diagnoses:    ICD-10-CM ICD-9-CM   1. Decreased strength of lower extremity  R29.898 729.89   2. Gait disturbance  R26.9 781.2   3. Balance disorder  R26.89 781.99       Subjective   Madhu Santoro reports: that he fell again but did not hurt anything he just couldn't stand up afterwards. His wife had to come help him up and he was exhausted afterwards. He reports that when he fell he did not have any AD with him, since then he has been keeping it a lot closer. He reports that he feels like he is getting weaker and weaker everyday and losing his quality of life.       Objective       See Exercise, Manual, and Modality Logs for complete treatment.     Patient Education: HEP review  Exercise rationale/ pain free exercise performance  Alternate exercise positions  Verbal/Tactile cues to ensure correct exercise performance/technique       Assessment/Plan  Patient demonstrates good tolerance to continued and new therapeutic exercises on this date with general fatigue reported throughout. Continued with LE strengthening exercises in both seated and standing positions. Introduced kneeling to standing on an elevated surface to begin to progress to floor transfers. Continued with ambulation with no AD, more festering gait and smaller steps were noted on this date compared to previous sessions. Will continue to progress as tolerated.       Progress per Plan of Care and Progress strengthening /stabilization /functional activity          Timed:         Manual Therapy:    -     mins  83900;     Therapeutic Exercise:    15     mins  40513;     Neuromuscular Ankush:    18    mins  66300;    Therapeutic Activity:     25     mins  66619;     Gait Training:            mins  19455;     Ultrasound:          mins  56571;    Ionto:                                   mins  52633  Self Care:                       -     mins  49191    Un-Timed:  Electrical Stimulation:         mins  10287 ( );  Dry Needling          mins self-pay  Traction          mins 63135  Re-Eval                               mins  56756  Group Therapy           ___ mins [id number]    Timed Treatment:   58   mins   Total Treatment:     60   mins    Mylene Hernandez PT  Physical Therapist  [facility] license #: [id number]

## 2025-05-02 DIAGNOSIS — M51.379 DDD (DEGENERATIVE DISC DISEASE), LUMBOSACRAL: ICD-10-CM

## 2025-05-02 DIAGNOSIS — G89.29 CHRONIC PAIN OF BOTH HIPS: ICD-10-CM

## 2025-05-02 DIAGNOSIS — M25.50 ARTHRALGIA OF MULTIPLE JOINTS: ICD-10-CM

## 2025-05-02 DIAGNOSIS — M25.552 CHRONIC PAIN OF BOTH HIPS: ICD-10-CM

## 2025-05-02 DIAGNOSIS — M25.551 CHRONIC PAIN OF BOTH HIPS: ICD-10-CM

## 2025-05-02 RX ORDER — HYDROCODONE BITARTRATE AND ACETAMINOPHEN 5; 325 MG/1; MG/1
1 TABLET ORAL EVERY 8 HOURS PRN
Qty: 90 TABLET | Refills: 0 | Status: SHIPPED | OUTPATIENT
Start: 2025-05-02

## 2025-05-06 ENCOUNTER — TREATMENT (OUTPATIENT)
Dept: PHYSICAL THERAPY | Facility: CLINIC | Age: 80
End: 2025-05-06
Payer: MEDICARE

## 2025-05-06 DIAGNOSIS — R26.9 GAIT DISTURBANCE: ICD-10-CM

## 2025-05-06 DIAGNOSIS — R29.898 DECREASED STRENGTH OF LOWER EXTREMITY: Primary | ICD-10-CM

## 2025-05-06 DIAGNOSIS — R26.89 BALANCE DISORDER: ICD-10-CM

## 2025-05-06 PROCEDURE — 97110 THERAPEUTIC EXERCISES: CPT

## 2025-05-06 PROCEDURE — 97535 SELF CARE MNGMENT TRAINING: CPT

## 2025-05-06 NOTE — PROGRESS NOTES
Physical Therapy Daily Treatment and Progress Note  [facility] [place]  [address], [address]  [place] [address]     Patient: Madhu Santoro  : 1945  Referring practitioner: Faith Alcocer APRN  Today's Date: 2025    VISIT#: 14    Visit Diagnoses:    ICD-10-CM ICD-9-CM   1. Decreased strength of lower extremity  R29.898 729.89   2. Gait disturbance  R26.9 781.2   3. Balance disorder  R26.89 781.99       Subjective Questionnaire: ABC: 16%- 12%  PARKER/56-     Subjective   Madhu Santoro reports: that he hasn't really seen any improvements with OPT, maybe about 10%. He reports that some things have gotten a little easier such as transferring from sitting to standing and doing stairs. His balance continues to be his biggest complaint.       Objective     Strength/Myotome Testing      Left Hip   Planes of Motion   Flexion: 4-  External rotation: 4-  Internal rotation: 4-     Right Hip   Planes of Motion   Flexion: 4-  External rotation: 4-  Internal rotation: 4-     Left Knee   Flexion: 4 (4)  Extension: 4- (4+)     Right Knee   Flexion: 4- (4)  Extension: 4- (4+)      Comments  30 sec STS: 5x with B UE assist and walker placed anteriorly, 7x with B UE no walker anteriorly placed  TU.85 sec with rolling walker, he carries the walker, reporting that he doesn't shuffle as much, festering gait with turn (19.66 sec with SPC)        See Exercise, Manual, and Modality Logs for complete treatment.     Patient Education: HEP review  Exercise rationale/ pain free exercise performance  Alternate exercise positions  Verbal/Tactile cues to ensure correct exercise performance/technique       Assessment/Plan  Patient has demonstrated good progress thus far with skilled therapeutic interventions. Subjectively he reports seeing about a 10% improvement, he is willing to try a neuro OPT. His outcome measure score demonstrates a slight decrease in perceived ability/  confidence. Objectively, both hip strength and functional activity tolerance has improved along with improved balance. TUG, PARKER, and 30 sec STS tests demonstrated clinically significant improvements. These improvements have made it easier for the patient to transfer from sitting to standing and going up the one stair to enter the house, he also has seen some improvements with ascending and descending the stairs to the basement, along with walking out to the garage. Balance continues to be his biggest deficit. I answered any questions that he had at the time and discussed continuing with the 2x a week as stated in the initial POC, while he contacts the neuro outpatient clinic, to continue to target the remaining deficits. He will greatly benefit from continued skilled therapeutic interventions whether it is at this clinic or a more neuro specific setting.        Progress per Plan of Care and Progress strengthening /stabilization /functional activity          Timed:         Manual Therapy:    -     mins  04169;     Therapeutic Exercise:    10     mins  89891;     Neuromuscular Ankush:    -    mins  17343;    Therapeutic Activity:     7     mins  41759;     Gait Training:           mins  35079;     Ultrasound:          mins  40254;    Ionto:                                   mins  51428  Self Care:                       8     mins  00488    Un-Timed:  Electrical Stimulation:         mins  45452 ( );  Dry Needling          mins self-pay  Traction          mins 93210  Re-Eval                               mins  00059  Group Therapy           ___ mins 79670    Timed Treatment:   25   mins   Total Treatment:     52   mins    Mylene Hernandez PT  Physical Therapist  [facility] license #: [id number]

## 2025-05-07 ENCOUNTER — OFFICE VISIT (OUTPATIENT)
Dept: ONCOLOGY | Facility: CLINIC | Age: 80
End: 2025-05-07
Payer: MEDICARE

## 2025-05-07 ENCOUNTER — LAB (OUTPATIENT)
Dept: LAB | Facility: HOSPITAL | Age: 80
End: 2025-05-07
Payer: MEDICARE

## 2025-05-07 VITALS
DIASTOLIC BLOOD PRESSURE: 79 MMHG | TEMPERATURE: 97.3 F | SYSTOLIC BLOOD PRESSURE: 145 MMHG | HEART RATE: 53 BPM | BODY MASS INDEX: 20.23 KG/M2 | OXYGEN SATURATION: 99 % | WEIGHT: 133.5 LBS | HEIGHT: 68 IN

## 2025-05-07 DIAGNOSIS — D45 POLYCYTHEMIA VERA: ICD-10-CM

## 2025-05-07 DIAGNOSIS — D45 POLYCYTHEMIA VERA: Primary | ICD-10-CM

## 2025-05-07 DIAGNOSIS — Z79.899 HIGH RISK MEDICATION USE: ICD-10-CM

## 2025-05-07 LAB
BASOPHILS # BLD AUTO: 0.02 10*3/MM3 (ref 0–0.2)
BASOPHILS NFR BLD AUTO: 0.2 % (ref 0–1.5)
DEPRECATED RDW RBC AUTO: 79.3 FL (ref 37–54)
EOSINOPHIL # BLD AUTO: 0.09 10*3/MM3 (ref 0–0.4)
EOSINOPHIL NFR BLD AUTO: 1.1 % (ref 0.3–6.2)
ERYTHROCYTE [DISTWIDTH] IN BLOOD BY AUTOMATED COUNT: 24.1 % (ref 12.3–15.4)
HCT VFR BLD AUTO: 51.4 % (ref 37.5–51)
HGB BLD-MCNC: 15.8 G/DL (ref 13–17.7)
IMM GRANULOCYTES # BLD AUTO: 0.09 10*3/MM3 (ref 0–0.05)
IMM GRANULOCYTES NFR BLD AUTO: 1.1 % (ref 0–0.5)
LYMPHOCYTES # BLD AUTO: 1.22 10*3/MM3 (ref 0.7–3.1)
LYMPHOCYTES NFR BLD AUTO: 14.6 % (ref 19.6–45.3)
MCH RBC QN AUTO: 27.9 PG (ref 26.6–33)
MCHC RBC AUTO-ENTMCNC: 30.7 G/DL (ref 31.5–35.7)
MCV RBC AUTO: 90.7 FL (ref 79–97)
MONOCYTES # BLD AUTO: 0.46 10*3/MM3 (ref 0.1–0.9)
MONOCYTES NFR BLD AUTO: 5.5 % (ref 5–12)
NEUTROPHILS NFR BLD AUTO: 6.47 10*3/MM3 (ref 1.7–7)
NEUTROPHILS NFR BLD AUTO: 77.5 % (ref 42.7–76)
NRBC BLD AUTO-RTO: 0 /100 WBC (ref 0–0.2)
PLATELET # BLD AUTO: 270 10*3/MM3 (ref 140–450)
PMV BLD AUTO: 9.6 FL (ref 6–12)
RBC # BLD AUTO: 5.67 10*6/MM3 (ref 4.14–5.8)
WBC NRBC COR # BLD AUTO: 8.35 10*3/MM3 (ref 3.4–10.8)

## 2025-05-07 PROCEDURE — 36415 COLL VENOUS BLD VENIPUNCTURE: CPT

## 2025-05-07 PROCEDURE — 85025 COMPLETE CBC W/AUTO DIFF WBC: CPT

## 2025-05-07 NOTE — PROGRESS NOTES
REASON FOR FOLLOW-UP: Polycythemia vera, JAK2 mutation detected    HISTORY OF PRESENT ILLNESS:   The patient is a 79 y.o. male with the above-mentioned history, who returns to the office today for follow-up and lab review. He continues on Hydrea 500mg daily.  He overall tolerates this well.  He did recently have a leak of his feeding tube.  Ultimately, his feeding tube was removed and the patient is attempting to maintain his nutritional intake by mouth.  He otherwise has no new concerns.  His wife does question ongoing use of gabapentin.  It appears this was previously discontinued during hospitalization though they have continued to take this.  This is historically been managed by neurology.      Hematologic/oncologic history:     The patient is a 79-year-old male followed with a number of issues including coronary artery disease, status post CABG, atrial flutter, previous CVA hyperlipidemia, GE reflux, carotid vascular disease, and hypertension.     He had been seen by vascular 2/25/2022 with mild progression of carotid disease but otherwise stable and also had follow-up with pain management for back pain 3/28/2022 requiring chronic narcotic therapy.  Patient has been resistant to epidural like procedures.     Unfortunately has had concurrent constipation requiring laxatives and additional opioid antagonist to reduce GI hypomotility.  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     Patient recently reviewed again by pain management with worsening pain.  Patient also saw GI periodically undergoing a follow-up MRI of the abdomen 8/9/2022 with scattered pancreatic cystic lesions unchanged from previous.  He had previously undergone EGD and colonoscopy 11/5/2021 with irregular Z-line and biopsies taken in nonbleeding internal hemorrhoids found during  retroflexion that were small.  There are scattered small and large mouth diverticula found in the sigmoid colon descending colon and splenic flexure.       The patient now presents for thrombocytosis with review of his record over the last year demonstrating a platelet count that is escalated from 3-400,000 now to 8/11/2022 648,000 but concurrent microcytic and hypochromic indices.  These indices have been slowly reducing over the last 2 years approximately followed more closely.        These findings are discussed with the patient 8/16/2022 who indicates that he actually feels about the same though still has issues with back pain.  He is noted no change in bowel habit including, fortunately, improvement of his constipation without the use of additional medication such as Movantik.  He has not noted any change in the color of his stool including dark stools or any blood per rectum, urine though he does note periodic excess bruising from his anticoagulation therapy.       The patient moved to a trial of iron which, unfortunately, worsened his constipation in which he had discontinue. He had further issues in early September with left renal stone, requiring cystoscopy, stent placement 8/23/2022.       He is next seen back 9/28/2022 with repeat studies performed 9/21 demonstrating 5% iron saturation, ferritin of 12.5.  He remains further weight and fatigue, again oral iron intolerant and will require IV iron preparations for his iron deficiency anemia.      The patient is next seen 1/18/2023 with considerable improvement in his testing including H&H now 17.2 and 55.4 though with iron saturation of 8% and ferritin 26.5.  He has not noted any blood loss but remains generally weak and with ongoing periodic abdominal pain.  His major concerns continue to be a disequilibrium since his previous CVA symptoms.  He has not been seen in follow-up by neurology.  He continues to have hematuria by urinary assessment but not gross  findings.  We have discussed that he may actually have a myeloproliferative disorder and requested that he undergo an assessment for JAK2 analysis today.    He is seen back 4/17/2023JAK  2-V617 F mutation having been detected.  In the interval he was admitted 2/19-21/23 for weakness, fall and ER evaluation not demonstrate any acute intracranial process, CT of lumbar spine with lumbar degenerative disease and other studies not show any acute abnormalities.  Fortunately he went on to improve though his wife had a positive COVID test recently on home examination.  His follow-up testing 4/10/2023 include an H&H of 18.3 and 60.9 white count of 14,700 platelet count of 477,000.    He is seen 4/17/2023 and we discussed that he is better served with phlebotomy at this point.  He states he feels so poorly that he is quite willing to try to move to additional therapies including phlebotomy.    Patient initiating Hydrea 1000 mg daily as of 5/19/2023.    He is next seen back in office 6/30/2023.  He is gradually seen an improvement in his hemoglobin hematocrit dropping to a normal CBC approximately 6/30/2023 H&H of 14.1 and 46.6, white count of 4800 and platelet count of 146,000.  He is tolerating treatment well without additional side effects.    Patient seen 9/21/2023 with H&H 11.1 and 32.9, white count 5110, platelet count 151,000.  Patient without additional side effect from Hydrea though dose is now reduced to 500 mg every other day.    He was next seen in office 4/12/2024 after complications following COVID development late last year.  He has been hospitalized several times, and rehab several times and had evidence hemolysis leading to discontinuance of his Hydrea.  He was last seen in mid January stable hematologically.  Unfortunately we did not have additional recommendations as he tried to recover from the infection.        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  Hematologically, fortunately, he remained stable and is off Hydrea.    He is next seen 7/24/2024 reasonably stable hematologically.  At present no intervention is necessary.    Patient evaluated 3/26/2025 not requiring phlebotomy, stable on current Hydrea dosing.  Past Medical History:   Diagnosis Date    Anxiety     Arthritis     Atrial flutter     Status post cavotricuspid isthmus ablation by Dr. Gustafson on 4/18/17    Mandel esophagus     Benign essential hypertension     CAD (coronary artery disease)     3 vessel CABG 4/11/17 by Dr. Cortez: ROSARIO-prox LAD, SVG-OM1, SVG-OM3    Carotid artery disease     Status post carotid endarterectomy - [facility] 4/10/17: 50-59% NICHELLE, 1-15% LICA.     Colonic polyp     COVID-19 long hauler     Cyst of pancreas     DDD (degenerative disc disease), lumbosacral     GERD (gastroesophageal reflux disease)     H/O bone density study 2013    H/O complete eye exam 2014    History of kidney stone     HLD (hyperlipidemia)     Hypertension     Kidney stone     8/22/22    Lipid screening 05/31/2013    Low back pain     physical therapy [facility] rehab 5-12-10    Parkinson disease     Screening for prostate cancer 07/07/2015    Skin cancer     nose    Stroke     RESIDUAL--\"BALANCE ISSUES\"        Past Surgical History:   Procedure Laterality Date    CARDIAC CATHETERIZATION N/A 04/10/2017    Procedure: Left Heart Cath;  Surgeon: Marjorie Healy MD;  Location:  [facility]VASIVE [facility];  Service:     CARDIAC CATHETERIZATION N/A 04/10/2017    Procedure: Coronary angiography;  Surgeon: Marjorie Healy MD;  Location:  [facility];  Service:     CARDIAC CATHETERIZATION N/A 04/10/2017    Procedure: Left ventriculography;  Surgeon: Marjorie Healy MD;  Location:  [facility];  Service:     CARDIAC CATHETERIZATION  2011    CARDIAC ELECTROPHYSIOLOGY PROCEDURE N/A 04/18/2017    " Procedure: Ablation atrial flutter;  Surgeon: Jose Antonio Gustafson MD;  Location: [facility] CATH INVASIVE LOCATION;  Service:     CAROTID ENDARTERECTOMY      CHOLECYSTECTOMY      CHOLECYSTECTOMY WITH INTRAOPERATIVE CHOLANGIOGRAM N/A 09/07/2019    Procedure: Laparoscopic cholecystectomy with intraoperative cholangiogram;  Surgeon: Gauri Travis MD;  Location: [facility] MAIN OR;  Service: General    COLONOSCOPY  01/06/2015    Diverticulosis, one TA    COLONOSCOPY N/A 02/14/2019    tics, NBIH, adenomatous polyp x 2    COLONOSCOPY N/A 11/05/2021    Procedure: COLONOSCOPY TO CECUM AND TERM. ILEUM WITH COLD POLYPECTOMIES;  Surgeon: Everton Abel MD;  Location: [facility] ENDOSCOPY;  Service: Gastroenterology;  Laterality: N/A;  PRE OP - PERS H/O POLYPS  POST OP - COLON POLYPS,, DIVERTICULOSIS, HEMORRHOIDS    CORONARY ARTERY BYPASS GRAFT N/A 04/11/2017    Procedure: AR STERNOTOMY CORONARY ARTERY BYPASS GRAFT TIMES 3 USING LEFT INTERNAL MAMMARY ARTERY AND LEFT GREATER SAPHENOUS VEIN GRAFT PER ENDOSCOPIC VEIN HARVESTING AND PRP ;  Surgeon: Temo Cortez MD;  Location: [facility] MAIN OR;  Service:     ENDOSCOPY  01/06/2015    HH, Ervin's esophagus    ENDOSCOPY N/A 02/14/2019    Z line irregular, HH, Ervin's esophagus    ENDOSCOPY N/A 11/05/2021    Procedure: ESOPHAGOGASTRODUODENOSCOPY WITH BIOPSIES;  Surgeon: Everton Abel MD;  Location: [facility] ENDOSCOPY;  Service: Gastroenterology;  Laterality: N/A;  PRE OP - PERS H/O ERVIN'S  POST OP - IRREG Z LINE    ENDOSCOPY W/ PEG TUBE PLACEMENT N/A 11/6/2023    Procedure: ESOPHAGOGASTRODUODENOSCOPY WITH PERCUTANEOUS ENDOSCOPIC GASTROSTOMY TUBE INSERTION;  Surgeon: Temo Ramsey MD;  Location: [facility] ENDOSCOPY;  Service: General;  Laterality: N/A;  Pre: dysphagia  Post: same    KNEE SURGERY Left     URETEROSCOPY LASER LITHOTRIPSY WITH STENT INSERTION Left 8/23/2022    Procedure: Cysto retrograde with left uretro stent placement;  Surgeon: Damien Oliveira MD;   Location: [facility] MAIN OR;  Service: Urology;  Laterality: Left;    VASECTOMY          Current Outpatient Medications on File Prior to Visit   Medication Sig Dispense Refill    allopurinol (ZYLOPRIM) 300 MG tablet TAKE 1 TABLET BY MOUTH DAILY 14 tablet 0    apixaban (ELIQUIS) 2.5 MG tablet tablet Take 1 tablet by mouth Every 12 (Twelve) Hours. Indications: Atrial Fibrillation      gabapentin (NEURONTIN) 100 MG capsule Take 1 capsule by mouth 2 (Two) Times a Day.      HYDROcodone-acetaminophen (NORCO) 5-325 MG per tablet Take 1 tablet by mouth Every 8 (Eight) Hours As Needed for Severe Pain or Moderate Pain. 30 day supply. DNF 5/3/25 90 tablet 0    hydroxyurea (Hydrea) 500 MG capsule Take 1 capsule by mouth Daily. 30 capsule 3    lisinopril (PRINIVIL,ZESTRIL) 5 MG tablet TAKE ONE TABLET BY MOUTH DAILY 180 tablet 0    Nutritional Supplements (OSMOLITE 1.5 GIANNI PO) 240 Units/oz/day by Gastrostomy Tube route 6 (Six) Times a Day.      rosuvastatin (CRESTOR) 20 MG tablet TAKE ONE TABLET BY MOUTH NIGHTLY 90 tablet 0    carbidopa-levodopa (SINEMET)  MG per tablet Take 1 tablet by mouth 4 (Four) Times a Day for 30 days. 120 tablet 4     No current facility-administered medications on file prior to visit.        ALLERGIES:    Allergies   Allergen Reactions    Atorvastatin Myalgia     Myalgia    Penicillins Hives, Swelling and Rash     Tolerates cephalosporins     Beta lactam allergy details  Antibiotic reaction: hives, rash, other (swelling)  Age at reaction: unknown  Dose to reaction time: unknown  Reason for antibiotic: unknown  Epinephrine required for reaction?: unknown  Tolerated antibiotics: unknown           Social History     Socioeconomic History    Marital status:      Spouse name: Brooke    Years of education: 9th grade   Tobacco Use    Smoking status: Former     Current packs/day: 0.00     Average packs/day: 1 pack/day for 50.0 years (50.0 ttl pk-yrs)     Types: Cigarettes     Start date: 1/1/1959      "Quit date: 2009     Years since quittin.3     Passive exposure: Past    Smokeless tobacco: Never    Tobacco comments:     CAFFEINE USE   Vaping Use    Vaping status: Never Used   Substance and Sexual Activity    Alcohol use: Not Currently     Comment: rarely    Drug use: No    Sexual activity: Defer     Partners: Female        Family History   Problem Relation Age of Onset    Hypertension Mother     Heart disease Mother     Heart attack Mother     Stroke Mother     Heart disease Father     Heart attack Father     Stroke Father     Hypertension Sister     Heart attack Brother     Heart disease Brother     No Known Problems Brother     Heart disease Brother     Heart attack Brother     Diabetes Brother     No Known Problems Maternal Grandmother     No Known Problems Maternal Grandfather     No Known Problems Paternal Grandmother     No Known Problems Paternal Grandfather     Pancreatic cancer Paternal Cousin     Arthritis Other     Diabetes Other     Hypertension Other     Kidney disease Other         stones    Malig Hyperthermia Neg Hx         Objective     Vitals:    25 1353   BP: 145/79   Pulse: 53   Temp: 97.3 °F (36.3 °C)   TempSrc: Oral   SpO2: 99%   Weight: 60.6 kg (133 lb 8 oz)   Height: 172.7 cm (67.99\")   PainSc: 0-No pain               2025     1:54 PM   Current Status   ECOG score 1       Physical Exam  Vitals reviewed.   Constitutional:       Appearance: Normal appearance. He is well-developed.      Comments: Using a walker.    HENT:      Head: Normocephalic and atraumatic.   Eyes:      Pupils: Pupils are equal, round, and reactive to light.   Cardiovascular:      Rate and Rhythm: Normal rate.   Pulmonary:      Effort: Pulmonary effort is normal. No respiratory distress.      Breath sounds: Normal breath sounds.   Abdominal:      Palpations: Abdomen is soft.      Comments: G tube in place.   Musculoskeletal:         General: Normal range of motion.      Cervical back: Normal range of " motion.   Skin:     General: Skin is warm and dry.      Findings: No rash.   Neurological:      Mental Status: He is alert and oriented to person, place, and time.         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  These indices have been slowly reducing over the last 2 years approximately followed more closely.  Concurrently is a mild drop in his baseline hemoglobin still well within normal limits.  thrombocytosis thought, initially, to be related to iron deficiency.  Ferritin found to be 16.3 and iron of 26 with 5% saturation and TIBC 511.   The patient was placed on ferrous gluconate which, unfortunately, he was unable to tolerate with worsening constipation.  He had further issues in early September with left renal stone, requiring cystoscopy, stent placement 8/23/2022.  9/28/2022 with repeat studies performed 9/21 demonstrating 5% iron saturation, ferritin of 12.5.  He remains further weight and fatigue, again oral iron intolerant and will require IV iron preparations for his iron deficiency anemia.  We discontinued oral iron and proceeded with Injectafer given 9/28/2022 in 10/5/2022  4/17/2023JAK  2-V617 F mutation having been detected.    In the interval he was admitted 2/19-21/23 for weakness, fall and ER evaluation not demonstrate any acute intracranial process, CT of lumbar spine with lumbar degenerative disease and other studies not show any acute abnormalities.  Fortunately he went on to improve though his wife had a positive  COVID test recently on home examination.  4/10/2023 include an H&H of 18.3 and 60.9 white count of 14,700 platelet count of 477,000.  5/15/2023 hemoglobin 15.3, hematocrit 50.4.  Reviewed with Dr. Lopez plans to hold off on phlebotomy and initiate Hydrea 1000 mg daily.  We will tentatively schedule him for return follow-up visit in 2 weeks with repeat labs and reassessment.  6/1/2020 2:23 weeks of Hydrea 1000 mg daily, WBC 5.0, hemoglobin 15.2, hematocrit 49.8, platelets 113,000.  Hydrea was reduced to 500 mg daily  Stability of counts today, 6/15/2023 on 500 mg daily with WBC 4.81, hemoglobin 14.6, hematocrit 46.7%, platelets 156,000  Patient seen 6/30/2023 with H&H of 14.1 and 46.6 white count of 4800 and platelet count of 1 46,000.  Patient subsequently assessed including 9/21/2023 with H&H gradually dropping 11.1 and 32.9, white count of 5110, platelet count 151,000, .8.  10/20/2023 WBC 8.04, SNC 5.42, Hgb 13.1, Platelets 247,000.  Continue Hydrea 500 mg every other day.  11/9/2023-hospitalized with COVID-pneumonia and severe deconditioningdischarged to nursing home on 11/9/2023 off Hydrea but on Eliquis  Evidence of hemolysis in 12/23 with negative PNH workup and very low titer cold agglutinins  CBC stable - hold hydrea  Assessed 4/12/2024-stable hematologically, Hydrea held  7/2024: This assessment despite leukocytosis and thrombocytosis his anemia is not worsening and intervention with treatment such as Hydrea phlebotomy is not necessary as of yet.  1/21/2025: WBC 33.01, platelets elevated to 837,000 today. Over the last several months, blood counts have continued to increase while hydrea has been held. Reviewed results with Dr. Lopez who advised restarting hydrea 500mg daily.   2/18/2025: WBC 18.32, hemoglobin 15.5, platelets 664,000. Continue hydrea 500mg daily.   Reassessment 3/26/2025 with H&H of 15.7 and 53.5 with white count 11,030 platelet count of 2 55,000, stable on current Hydrea  dosing  5/7/2025 patient returns continuing on Hydrea 500 mg daily with WBC 8.25, hemoglobin 15.8, hematocrit 51.4%, platelets 270,000    *Hospitalized in mid October to early November with COVID-pneumonia with profound deconditioning discharged to nursing home  Readmitted 11/22/2023 with anemia-patient has not been on hydroxyurea at discharge from the hospital and remains on Eliquis  MCV is dropped significantly suggesting iron depletion and blood loss (but also may be related to being off Hydrea)  Patient himself denies hematochezia or melena  B12 folate normal ferritin 566 iron saturation 12% reticulocyte count 9% rule out hemolysis although acute blood loss can also cause an elevated reticulocyte  LDH elevated 356 haptoglobin low at 24-suggest hemolysis suggest hemolysis   Crossmatch compatible suggesting indirect claudia neg  Direct claudia Complement +?  Cold agglutinin versus PNH-PNH profile and cold agglutinin titer pending  Hemoglobin stable 11/27/2023-9.2  CT abdomen/pelvis 11/27/2023-suggestive of lower lobe pneumonia, stable pancreatic lesions probable IPMN, no evidence of bleeding  Discharged from rehab on Friday and readmitted with fall and difficulty walking 4 days later-CBC stable  Cold agglutinins 1:32 PNH negative  Patient seen 4/12/24 stable hematologically, Hydrea not reinstituted  Reassessment 7/24/2024 again stable hematologically.      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 Continue on Sinemet.  Recent worsening symptoms on combined therapy 1/3/1/25  Patient reports difficulty getting gabapentin refilled.  It appears this was discontinued during hospitalization February 2025.  I will reach out to neurology to determine if this needs to be continued.    PLAN:   Continue current dose of Hydrea 500 mg daily  I will discuss with neurology the need for ongoing gabapentin  Continue low-dose Eliquis per cardiology  Return  in 6 weeks for CBC, CMP, MD follow-up with Dr. Lopez    Patient is on a high risk medication requiring close monitoring for toxicity.    Nessa Vegas, APRN  05/07/2025

## 2025-05-08 ENCOUNTER — TREATMENT (OUTPATIENT)
Dept: PHYSICAL THERAPY | Facility: CLINIC | Age: 80
End: 2025-05-08
Payer: MEDICARE

## 2025-05-08 DIAGNOSIS — R26.89 BALANCE DISORDER: ICD-10-CM

## 2025-05-08 DIAGNOSIS — R26.9 GAIT DISTURBANCE: ICD-10-CM

## 2025-05-08 DIAGNOSIS — R29.898 DECREASED STRENGTH OF LOWER EXTREMITY: Primary | ICD-10-CM

## 2025-05-08 PROCEDURE — 97112 NEUROMUSCULAR REEDUCATION: CPT

## 2025-05-08 PROCEDURE — 97110 THERAPEUTIC EXERCISES: CPT

## 2025-05-08 PROCEDURE — 97530 THERAPEUTIC ACTIVITIES: CPT

## 2025-05-08 NOTE — PROGRESS NOTES
Physical Therapy Daily Treatment Note  [facility] [place]  [address], [address]  [place] [address]     Patient: Madhu Santoro  : 1945  Referring practitioner: Faith Alcocer APRN  Today's Date: 2025    VISIT#: 15    Visit Diagnoses:    ICD-10-CM ICD-9-CM   1. Decreased strength of lower extremity  R29.898 729.89   2. Gait disturbance  R26.9 781.2   3. Balance disorder  R26.89 781.99       Subjective   Madhu Santoro reports: that he is feeling the same, getting discouraged as he reports he has no quality of life. He also reports some right sided pain in the chest region.      Objective       See Exercise, Manual, and Modality Logs for complete treatment.     Patient Education: HEP review  Exercise rationale/ pain free exercise performance  Alternate exercise positions  Verbal/Tactile cues to ensure correct exercise performance/technique       Assessment/Plan  Patient continues to demonstrate good tolerance to continued therapeutic exercises on this date with report of general fatigue reported through out with seated rest breaks required throughout. He was able to ambulate 4 laps on this date vs normal 3 laps, no shuffling or festering gait noted even when ambulating through narrow spaces. STS were completed with a KB with one UE assist required. Continued with focusing on LE strength in functional activities. Will continue to progress as tolerated.      Progress per Plan of Care and Progress strengthening /stabilization /functional activity          Timed:         Manual Therapy:    -     mins  05790;     Therapeutic Exercise:    10     mins  81818;     Neuromuscular Ankush:    15    mins  92189;    Therapeutic Activity:     30     mins  87700;     Gait Training:           mins  63352;     Ultrasound:          mins  14520;    Ionto:                                   mins  59156  Self Care:                       -     mins  89489    Un-Timed:  Electrical  Stimulation:         mins  75941 ( );  Dry Needling          mins self-pay  Traction          mins 57629  Re-Eval                               mins  11815  Group Therapy           ___ mins 84301    Timed Treatment:   55   mins   Total Treatment:     60   mins    Mylene Hernandez PT  Physical Therapist  [place] REECE license #: [id number]

## 2025-05-12 ENCOUNTER — TREATMENT (OUTPATIENT)
Dept: PHYSICAL THERAPY | Facility: CLINIC | Age: 80
End: 2025-05-12
Payer: MEDICARE

## 2025-05-12 DIAGNOSIS — R26.89 BALANCE DISORDER: ICD-10-CM

## 2025-05-12 DIAGNOSIS — R29.898 DECREASED STRENGTH OF LOWER EXTREMITY: Primary | ICD-10-CM

## 2025-05-12 DIAGNOSIS — R26.9 GAIT DISTURBANCE: ICD-10-CM

## 2025-05-12 PROCEDURE — 97112 NEUROMUSCULAR REEDUCATION: CPT

## 2025-05-12 PROCEDURE — 97530 THERAPEUTIC ACTIVITIES: CPT

## 2025-05-12 NOTE — PROGRESS NOTES
Physical Therapy Daily Treatment Note  [facility] Physical Therapy [place]  [address], [address]  [place] [address]     Patient: Madhu Santoro  : 1945  Referring practitioner: Faith Alcocer APRN  Today's Date: 2025    VISIT#: 16    Visit Diagnoses:    ICD-10-CM ICD-9-CM   1. Decreased strength of lower extremity  R29.898 729.89   2. Gait disturbance  R26.9 781.2   3. Balance disorder  R26.89 781.99       Subjective   Madhu Santoro reports: that he is going to cancel his last visit here and take the rest of the month to continue at home and try to get a hold of the outpatient neuro clinic. He continues to have chest pain and is extremely fatigued, very frustrated and feeling down.       Objective       See Exercise, Manual, and Modality Logs for complete treatment.     Patient Education: HEP review  Exercise rationale/ pain free exercise performance  Alternate exercise positions  Verbal/Tactile cues to ensure correct exercise performance/technique       Assessment/Plan  Patient demonstrates good tolerance to continued therapeutic exercises on this date with general fatigue reported throughout. He continues to aspirate when drinking water and his coughs have become weaker and he struggles to clear his throat. He continues to demonstrate general fatigue throughout. Discussed continuing at home and contacting his PCP to update him on his chest pain and continued general weakness. Discussed continuing with HEP as he decides what his next steps are.       Progress per Plan of Care and Progress strengthening /stabilization /functional activity          Timed:         Manual Therapy:    -     mins  12557;     Therapeutic Exercise:    -     mins  61305;     Neuromuscular Ankush:    10    mins  74863;    Therapeutic Activity:     15     mins  49317;     Gait Training:           mins  34007;     Ultrasound:          mins  46452;    Ionto:                                   mins   39210  Self Care:                       5    mins  83474    Un-Timed:  Electrical Stimulation:         mins  88730 ( );  Dry Needling          mins self-pay  Traction          mins 09067  Re-Eval                               mins  97917  Group Therapy           ___ mins 60725    Timed Treatment:   30   mins   Total Treatment:     55   mins    Mylene Hernandez PT  Physical Therapist  [place] REECE license #: [id number]

## 2025-05-14 ENCOUNTER — SPECIALTY PHARMACY (OUTPATIENT)
Dept: PHARMACY | Facility: HOSPITAL | Age: 80
End: 2025-05-14
Payer: MEDICARE

## 2025-05-14 RX ORDER — HYDROXYUREA 500 MG/1
500 CAPSULE ORAL DAILY
Qty: 30 CAPSULE | Refills: 3 | Status: SHIPPED | OUTPATIENT
Start: 2025-05-14

## 2025-05-14 NOTE — TELEPHONE ENCOUNTER
Specialty Pharmacy Patient Management Program  Per Protocol Prescription Order or Refill     Patient will be filling or currently fills medications at  [facility]  and is enrolled in the Patient Management Program.    Requested Prescriptions     Pending Prescriptions Disp Refills    hydroxyurea (Hydrea) 500 MG capsule 30 capsule 3     Sig: Take 1 capsule by mouth Daily.     Prescription orders above were sent to the pharmacy per Collaborative Care Agreement Protocol.     Last Office Visit: 5/7/25  Next Office Visit: 6/18/25    Jerad Vicente PharmD, BCOP  Clinical Specialty Pharmacist, Oncology  5/14/2025  15:10 EDT

## 2025-05-14 NOTE — TELEPHONE ENCOUNTER
Caller: Brooke Santoro ([facility])    Relationship: Emergency Contact    Best call back number: [phone]    Requested Prescriptions:   Requested Prescriptions     Pending Prescriptions Disp Refills    hydroxyurea (Hydrea) 500 MG capsule 30 capsule 3     Sig: Take 1 capsule by mouth Daily.        Pharmacy where request should be sent: [facility]       Last office visit with prescribing clinician: 3/26/2025   Last telemedicine visit with prescribing clinician: Visit date not found   Next office visit with prescribing clinician: 6/18/2025     Additional details provided by patient: HAS ONE LEFT     Does the patient have less than a 3 day supply:  [x] Yes  [] No    Would you like a call back once the refill request has been completed: [] Yes [] No    If the office needs to give you a call back, can they leave a voicemail: [] Yes [] No    Daniela Wilcox Rep   05/14/25 10:23 EDT

## 2025-05-22 DIAGNOSIS — M10.9 GOUT, UNSPECIFIED CAUSE, UNSPECIFIED CHRONICITY, UNSPECIFIED SITE: Chronic | ICD-10-CM

## 2025-05-22 RX ORDER — ALLOPURINOL 300 MG/1
300 TABLET ORAL DAILY
Qty: 30 TABLET | Refills: 0 | Status: SHIPPED | OUTPATIENT
Start: 2025-05-22

## 2025-05-22 NOTE — TELEPHONE ENCOUNTER
Caller: MaudeMadhu    Relationship: Self    Best call back number: [phone] (home)     Requested Prescriptions:   Requested Prescriptions     Pending Prescriptions Disp Refills    allopurinol (ZYLOPRIM) 300 MG tablet 14 tablet 0     Sig: Take 1 tablet by mouth Daily.      Pharmacy where request should be sent: [facility] - [place], [place] - [address] - [phone]  - [phone]     Last office visit with prescribing clinician: 11/14/2024   Last telemedicine visit with prescribing clinician: Visit date not found   Next office visit with prescribing clinician: Visit date not found     Additional details provided by patient: PT HAS 5 DAYS LEFT.     Does the patient have less than a 3 day supply:  [] Yes  [x] No    Would you like a call back once the refill request has been completed: [] Yes [x] No    Daniela Hinkle Rep   05/22/25 10:45 EDT

## 2025-05-22 NOTE — TELEPHONE ENCOUNTER
Gout Agent Protocol Dronab6605/22/2025 10:46 AM   Protocol Details Uric acid in past 12 months

## 2025-06-03 DIAGNOSIS — M25.552 CHRONIC PAIN OF BOTH HIPS: ICD-10-CM

## 2025-06-03 DIAGNOSIS — M25.551 CHRONIC PAIN OF BOTH HIPS: ICD-10-CM

## 2025-06-03 DIAGNOSIS — M25.50 ARTHRALGIA OF MULTIPLE JOINTS: ICD-10-CM

## 2025-06-03 DIAGNOSIS — G89.29 CHRONIC PAIN OF BOTH HIPS: ICD-10-CM

## 2025-06-03 DIAGNOSIS — M51.379 DDD (DEGENERATIVE DISC DISEASE), LUMBOSACRAL: ICD-10-CM

## 2025-06-03 RX ORDER — HYDROCODONE BITARTRATE AND ACETAMINOPHEN 5; 325 MG/1; MG/1
1 TABLET ORAL EVERY 8 HOURS PRN
Qty: 90 TABLET | Refills: 0 | Status: SHIPPED | OUTPATIENT
Start: 2025-06-03

## 2025-06-17 NOTE — PROGRESS NOTES
REASON FOR FOLLOW-UP: Polycythemia vera, JAK2 mutation detected    HISTORY OF PRESENT ILLNESS:   The patient is a 79 y.o. male with the above-mentioned history, who who had last been assessed 5/7/2025 .He continues on Hydrea 500mg daily.  He overall tolerates this well.  He did recently have a leak of his feeding tube.  Ultimately, his feeding tube was removed and the patient is attempting to maintain his nutritional intake by mouth.  He otherwise has no new concerns.  His wife does question ongoing use of gabapentin.  It appears this was previously discontinued during hospitalization though they have continued to take this.  This is historically been managed by neurology.  We made plans to continue Hydrea, adjusted to gabapentin, continue low-dose Eliquis and a 6-week follow-up was scheduled.    He is now seen 6/18/2025.  He remains quite weak and fatigued but able to function, G-tube now removed.  He has questions about gabapentin use which had previously helped his leg pain and hopes to have this assessed and addressed by Dr. Russell next week.  We would have no objection to using gabapentin at low-dose from our standpoint.  Hematologically he remains well-controlled on his low-dose Hydrea and will not require phlebotomy.    Hematologic/oncologic history:     The patient is a 79-year-old male followed with a number of issues including coronary artery disease, status post CABG, atrial flutter, previous CVA hyperlipidemia, GE reflux, carotid vascular disease, and hypertension.     He had been seen by vascular 2/25/2022 with mild progression of carotid disease but otherwise stable and also had follow-up with pain management for back pain 3/28/2022 requiring chronic narcotic therapy.  Patient has been resistant to epidural like procedures.     Unfortunately has had concurrent constipation requiring laxatives and additional opioid antagonist to reduce GI hypomotility.  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     Patient recently reviewed again by pain management with worsening pain.  Patient also saw GI periodically undergoing a follow-up MRI of the abdomen 8/9/2022 with scattered pancreatic cystic lesions unchanged from previous.  He had previously undergone EGD and colonoscopy 11/5/2021 with irregular Z-line and biopsies taken in nonbleeding internal hemorrhoids found during retroflexion that were small.  There are scattered small and large mouth diverticula found in the sigmoid colon descending colon and splenic flexure.       The patient now presents for thrombocytosis with review of his record over the last year demonstrating a platelet count that is escalated from 3-400,000 now to 8/11/2022 648,000 but concurrent microcytic and hypochromic indices.  These indices have been slowly reducing over the last 2 years approximately followed more closely.        These findings are discussed with the patient 8/16/2022 who indicates that he actually feels about the same though still has issues with back pain.  He is noted no change in bowel habit including, fortunately, improvement of his constipation without the use of additional medication such as Movantik.  He has not noted any change in the color of his stool including dark stools or any blood per rectum, urine though he does note periodic excess bruising from his anticoagulation therapy.       The patient moved to a trial of iron which, unfortunately, worsened his constipation in which he had discontinue. He had further issues in early September with left renal stone, requiring cystoscopy, stent placement 8/23/2022.       He is next seen back 9/28/2022 with repeat studies performed 9/21 demonstrating 5% iron saturation, ferritin of 12.5.  He remains further weight and fatigue, again oral iron intolerant and will require  IV iron preparations for his iron deficiency anemia.      The patient is next seen 1/18/2023 with considerable improvement in his testing including H&H now 17.2 and 55.4 though with iron saturation of 8% and ferritin 26.5.  He has not noted any blood loss but remains generally weak and with ongoing periodic abdominal pain.  His major concerns continue to be a disequilibrium since his previous CVA symptoms.  He has not been seen in follow-up by neurology.  He continues to have hematuria by urinary assessment but not gross findings.  We have discussed that he may actually have a myeloproliferative disorder and requested that he undergo an assessment for JAK2 analysis today.    He is seen back 4/17/2023JAK  2-V617 F mutation having been detected.  In the interval he was admitted 2/19-21/23 for weakness, fall and ER evaluation not demonstrate any acute intracranial process, CT of lumbar spine with lumbar degenerative disease and other studies not show any acute abnormalities.  Fortunately he went on to improve though his wife had a positive COVID test recently on home examination.  His follow-up testing 4/10/2023 include an H&H of 18.3 and 60.9 white count of 14,700 platelet count of 477,000.    He is seen 4/17/2023 and we discussed that he is better served with phlebotomy at this point.  He states he feels so poorly that he is quite willing to try to move to additional therapies including phlebotomy.    Patient initiating Hydrea 1000 mg daily as of 5/19/2023.    He is next seen back in office 6/30/2023.  He is gradually seen an improvement in his hemoglobin hematocrit dropping to a normal CBC approximately 6/30/2023 H&H of 14.1 and 46.6, white count of 4800 and platelet count of 146,000.  He is tolerating treatment well without additional side effects.    Patient seen 9/21/2023 with H&H 11.1 and 32.9, white count 5110, platelet count 151,000.  Patient without additional side effect from Hydrea though dose is now  "reduced to 500 mg every other day.    He was next seen in office 4/12/2024 after complications following COVID development late last year.  He has been hospitalized several times, and rehab several times and had evidence hemolysis leading to discontinuance of his Hydrea.  He was last seen in mid January stable hematologically.  Unfortunately we did not have additional recommendations as he tried to recover from the infection.        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  Hematologically, fortunately, he remained stable and is off Hydrea.    He is next seen 7/24/2024 reasonably stable hematologically.  At present no intervention is necessary.    Patient evaluated 3/26/2025 not requiring phlebotomy, stable on current Hydrea dosing.    Patient reassessed 6/18/2025 stable on current Hydrea dosing.  Past Medical History:   Diagnosis Date    Anxiety     Arthritis     Atrial flutter     Status post cavotricuspid isthmus ablation by Dr. Gustafson on 4/18/17    Mandel esophagus     Benign essential hypertension     CAD (coronary artery disease)     3 vessel CABG 4/11/17 by Dr. Cortez: ROSARIO-prox LAD, SVG-OM1, SVG-OM3    Carotid artery disease     Status post carotid endarterectomy - [facility] 4/10/17: 50-59% NICHELLE, 1-15% LICA.     Colonic polyp     COVID-19 long hauler     Cyst of pancreas     DDD (degenerative disc disease), lumbosacral     GERD (gastroesophageal reflux disease)     H/O bone density study 2013    H/O complete eye exam 2014    History of kidney stone     HLD (hyperlipidemia)     Hypertension     Kidney stone     8/22/22    Lipid screening 05/31/2013    Low back pain     physical therapy [facility] 5-12-10    Parkinson disease     Screening for prostate cancer 07/07/2015    Skin cancer     nose    Stroke     RESIDUAL--\"BALANCE ISSUES\"        Past Surgical History: "   Procedure Laterality Date    CARDIAC CATHETERIZATION N/A 04/10/2017    Procedure: Left Heart Cath;  Surgeon: Marjorie Healy MD;  Location: [facility] CATH INVASIVE LOCATION;  Service:     CARDIAC CATHETERIZATION N/A 04/10/2017    Procedure: Coronary angiography;  Surgeon: Marjorie Healy MD;  Location: [facility] CATH INVASIVE LOCATION;  Service:     CARDIAC CATHETERIZATION N/A 04/10/2017    Procedure: Left ventriculography;  Surgeon: Marjorie Healy MD;  Location: [facility] CATH INVASIVE LOCATION;  Service:     CARDIAC CATHETERIZATION  2011    CARDIAC ELECTROPHYSIOLOGY PROCEDURE N/A 04/18/2017    Procedure: Ablation atrial flutter;  Surgeon: Joes Antonio Gustafson MD;  Location: [facility] CATH INVASIVE LOCATION;  Service:     CAROTID ENDARTERECTOMY      CHOLECYSTECTOMY      CHOLECYSTECTOMY WITH INTRAOPERATIVE CHOLANGIOGRAM N/A 09/07/2019    Procedure: Laparoscopic cholecystectomy with intraoperative cholangiogram;  Surgeon: Gauri Travis MD;  Location: [facility] MAIN OR;  Service: General    COLONOSCOPY  01/06/2015    Diverticulosis, one TA    COLONOSCOPY N/A 02/14/2019    tics, NBIH, adenomatous polyp x 2    COLONOSCOPY N/A 11/05/2021    Procedure: COLONOSCOPY TO CECUM AND TERM. ILEUM WITH COLD POLYPECTOMIES;  Surgeon: Everton Abel MD;  Location: [facility] ENDOSCOPY;  Service: Gastroenterology;  Laterality: N/A;  PRE OP - PERS H/O POLYPS  POST OP - COLON POLYPS,, DIVERTICULOSIS, HEMORRHOIDS    CORONARY ARTERY BYPASS GRAFT N/A 04/11/2017    Procedure: AR STERNOTOMY CORONARY ARTERY BYPASS GRAFT TIMES 3 USING LEFT INTERNAL MAMMARY ARTERY AND LEFT GREATER SAPHENOUS VEIN GRAFT PER ENDOSCOPIC VEIN HARVESTING AND PRP ;  Surgeon: Temo Cortez MD;  Location[facility] OR;  Service:     ENDOSCOPY  01/06/2015    HH, Mandel's esophagus    ENDOSCOPY N/A 02/14/2019    Z line irregular, HH, Mandel's esophagus    ENDOSCOPY N/A 11/05/2021    Procedure: ESOPHAGOGASTRODUODENOSCOPY WITH BIOPSIES;  Surgeon: Everton Abel MD;  Location:   [facility];  Service: Gastroenterology;  Laterality: N/A;  PRE OP - PERS H/O ERVIN'S  POST OP - IRREG Z LINE    ENDOSCOPY W/ PEG TUBE PLACEMENT N/A 11/6/2023    Procedure: ESOPHAGOGASTRODUODENOSCOPY WITH PERCUTANEOUS ENDOSCOPIC GASTROSTOMY TUBE INSERTION;  Surgeon: Temo Ramsey MD;  Location: [facility] ENDOSCOPY;  Service: General;  Laterality: N/A;  Pre: dysphagia  Post: same    KNEE SURGERY Left     URETEROSCOPY LASER LITHOTRIPSY WITH STENT INSERTION Left 8/23/2022    Procedure: Cysto retrograde with left uretro stent placement;  Surgeon: Damien Oliveira MD;  Location: [facility] MAIN OR;  Service: Urology;  Laterality: Left;    VASECTOMY          Current Outpatient Medications on File Prior to Visit   Medication Sig Dispense Refill    allopurinol (ZYLOPRIM) 300 MG tablet Take 1 tablet by mouth Daily. 30 tablet 0    apixaban (ELIQUIS) 2.5 MG tablet tablet Take 1 tablet by mouth Every 12 (Twelve) Hours. Indications: Atrial Fibrillation      HYDROcodone-acetaminophen (NORCO) 5-325 MG per tablet Take 1 tablet by mouth Every 8 (Eight) Hours As Needed for Severe Pain or Moderate Pain. 30 day supply. 90 tablet 0    hydroxyurea (Hydrea) 500 MG capsule Take 1 capsule by mouth Daily. 30 capsule 3    lisinopril (PRINIVIL,ZESTRIL) 5 MG tablet TAKE ONE TABLET BY MOUTH DAILY 180 tablet 0    rosuvastatin (CRESTOR) 20 MG tablet TAKE ONE TABLET BY MOUTH NIGHTLY 90 tablet 0    carbidopa-levodopa (SINEMET)  MG per tablet Take 1 tablet by mouth 4 (Four) Times a Day for 30 days. 120 tablet 4    gabapentin (NEURONTIN) 100 MG capsule Take 1 capsule by mouth 2 (Two) Times a Day. (Patient not taking: Reported on 6/18/2025)      Nutritional Supplements (OSMOLITE 1.5 GIANNI PO) 240 Units/oz/day by Gastrostomy Tube route 6 (Six) Times a Day. (Patient not taking: Reported on 6/18/2025)       No current facility-administered medications on file prior to visit.        ALLERGIES:    Allergies   Allergen Reactions     "Atorvastatin Myalgia     Myalgia    Penicillins Hives, Swelling and Rash     Tolerates cephalosporins     Beta lactam allergy details  Antibiotic reaction: hives, rash, other (swelling)  Age at reaction: unknown  Dose to reaction time: unknown  Reason for antibiotic: unknown  Epinephrine required for reaction?: unknown  Tolerated antibiotics: unknown           Social History     Socioeconomic History    Marital status:      Spouse name: Brooke    Years of education: 9th grade   Tobacco Use    Smoking status: Former     Current packs/day: 0.00     Average packs/day: 1 pack/day for 50.0 years (50.0 ttl pk-yrs)     Types: Cigarettes     Start date: 1959     Quit date: 2009     Years since quittin.4     Passive exposure: Past    Smokeless tobacco: Never    Tobacco comments:     CAFFEINE USE   Vaping Use    Vaping status: Never Used   Substance and Sexual Activity    Alcohol use: Not Currently     Comment: rarely    Drug use: No    Sexual activity: Defer     Partners: Female        Family History   Problem Relation Age of Onset    Hypertension Mother     Heart disease Mother     Heart attack Mother     Stroke Mother     Heart disease Father     Heart attack Father     Stroke Father     Hypertension Sister     Heart attack Brother     Heart disease Brother     No Known Problems Brother     Heart disease Brother     Heart attack Brother     Diabetes Brother     No Known Problems Maternal Grandmother     No Known Problems Maternal Grandfather     No Known Problems Paternal Grandmother     No Known Problems Paternal Grandfather     Pancreatic cancer Paternal Cousin     Arthritis Other     Diabetes Other     Hypertension Other     Kidney disease Other         stones    Malig Hyperthermia Neg Hx         Objective     Vitals:    25 0822   BP: 135/77   Pulse: 73   Temp: 97.3 °F (36.3 °C)   TempSrc: Skin   SpO2: 99%   Weight: 59.4 kg (130 lb 14.4 oz)   Height: 172.7 cm (67.99\")   PainSc: 10-Worst pain " ever   PainLoc: Generalized  Comment: Legs, Feet                 6/18/2025     8:26 AM   Current Status   ECOG score 1       Physical Exam  Vitals reviewed.   Constitutional:       Appearance: Normal appearance. He is well-developed.      Comments: Using a walker.    HENT:      Head: Normocephalic and atraumatic.   Eyes:      Pupils: Pupils are equal, round, and reactive to light.   Cardiovascular:      Rate and Rhythm: Normal rate.   Pulmonary:      Effort: Pulmonary effort is normal. No respiratory distress.      Breath sounds: Normal breath sounds.   Abdominal:      Palpations: Abdomen is soft.      Comments: G-tube removed, exit site healed   Musculoskeletal:         General: Normal range of motion.      Cervical back: Normal range of motion.   Skin:     General: Skin is warm and dry.      Findings: No rash.   Neurological:      Mental Status: He is alert and oriented to person, place, and time.         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  These indices have been slowly reducing over the last 2 years approximately followed more closely.  Concurrently is a mild drop in his baseline hemoglobin still well within  normal limits.  thrombocytosis thought, initially, to be related to iron deficiency.  Ferritin found to be 16.3 and iron of 26 with 5% saturation and TIBC 511.   The patient was placed on ferrous gluconate which, unfortunately, he was unable to tolerate with worsening constipation.  He had further issues in early September with left renal stone, requiring cystoscopy, stent placement 8/23/2022.  9/28/2022 with repeat studies performed 9/21 demonstrating 5% iron saturation, ferritin of 12.5.  He remains further weight and fatigue, again oral iron intolerant and will require IV iron preparations for his iron deficiency anemia.  We discontinued oral iron and proceeded with Injectafer given 9/28/2022 in 10/5/2022  4/17/2023JAK  2-V617 F mutation having been detected.    In the interval he was admitted 2/19-21/23 for weakness, fall and ER evaluation not demonstrate any acute intracranial process, CT of lumbar spine with lumbar degenerative disease and other studies not show any acute abnormalities.  Fortunately he went on to improve though his wife had a positive COVID test recently on home examination.  4/10/2023 include an H&H of 18.3 and 60.9 white count of 14,700 platelet count of 477,000.  5/15/2023 hemoglobin 15.3, hematocrit 50.4.  Reviewed with Dr. Lopez plans to hold off on phlebotomy and initiate Hydrea 1000 mg daily.  We will tentatively schedule him for return follow-up visit in 2 weeks with repeat labs and reassessment.  6/1/2020 2:23 weeks of Hydrea 1000 mg daily, WBC 5.0, hemoglobin 15.2, hematocrit 49.8, platelets 113,000.  Hydrea was reduced to 500 mg daily  Stability of counts today, 6/15/2023 on 500 mg daily with WBC 4.81, hemoglobin 14.6, hematocrit 46.7%, platelets 156,000  Patient seen 6/30/2023 with H&H of 14.1 and 46.6 white count of 4800 and platelet count of 1 46,000.  Patient subsequently assessed including 9/21/2023 with H&H gradually dropping 11.1 and 32.9, white count of 5110, platelet count  151,000, .8.  10/20/2023 WBC 8.04, SNC 5.42, Hgb 13.1, Platelets 247,000.  Continue Hydrea 500 mg every other day.  11/9/2023-hospitalized with COVID-pneumonia and severe deconditioningdischarged to nursing home on 11/9/2023 off Hydrea but on Eliquis  Evidence of hemolysis in 12/23 with negative PNH workup and very low titer cold agglutinins  CBC stable - hold hydrea  Assessed 4/12/2024-stable hematologically, Hydrea held  7/2024: This assessment despite leukocytosis and thrombocytosis his anemia is not worsening and intervention with treatment such as Hydrea phlebotomy is not necessary as of yet.  1/21/2025: WBC 33.01, platelets elevated to 837,000 today. Over the last several months, blood counts have continued to increase while hydrea has been held. Reviewed results with Dr. Lopez who advised restarting hydrea 500mg daily.   2/18/2025: WBC 18.32, hemoglobin 15.5, platelets 664,000. Continue hydrea 500mg daily.   Reassessment 3/26/2025 with H&H of 15.7 and 53.5 with white count 11,030 platelet count of 2 55,000, stable on current Hydrea dosing  5/7/2025 patient returns continuing on Hydrea 500 mg daily with WBC 8.25, hemoglobin 15.8, hematocrit 51.4%, platelets 270,000  Patient reviewed 6/18/2025-H&H 15.6 and 51.5, platelet count of 312,000.    *Hospitalized in mid October to early November with COVID-pneumonia with profound deconditioning discharged to nursing home  Readmitted 11/22/2023 with anemia-patient has not been on hydroxyurea at discharge from the hospital and remains on Eliquis  MCV is dropped significantly suggesting iron depletion and blood loss (but also may be related to being off Hydrea)  Patient himself denies hematochezia or melena  B12 folate normal ferritin 566 iron saturation 12% reticulocyte count 9% rule out hemolysis although acute blood loss can also cause an elevated reticulocyte  LDH elevated 356 haptoglobin low at 24-suggest hemolysis suggest hemolysis   Crossmatch compatible  suggesting indirect claudia neg  Direct claudia Complement +?  Cold agglutinin versus PNH-PNH profile and cold agglutinin titer pending  Hemoglobin stable 11/27/2023-9.2  CT abdomen/pelvis 11/27/2023-suggestive of lower lobe pneumonia, stable pancreatic lesions probable IPMN, no evidence of bleeding  Discharged from rehab on Friday and readmitted with fall and difficulty walking 4 days later-CBC stable  Cold agglutinins 1:32 PNH negative  Patient seen 4/12/24 stable hematologically, Hydrea not reinstituted  Reassessment 7/24/2024 again stable hematologically.  Assessed 6/18/2025 clinically  stable.      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 Continue on Sinemet.  Recent worsening symptoms on combined therapy 1/3/1/25  Patient reports difficulty getting gabapentin refilled.  It appears this was discontinued during hospitalization February 2025.  I will reach out to neurology to determine if this needs to be continued.  Neurologic follow-up anticipated June 2025, issues to be addressed include current medications include reinstitution of Neurontin.  A copy of this note will be sent to that physician.    PLAN:   Continue current dose of Hydrea 500 mg daily  Again plans to discuss with neurology the need for ongoing gabapentin  Continue low-dose Eliquis per cardiology  Return in 6 weeks for CBC, 12 weeks NP, CBC    Patient is on a high risk medication requiring close monitoring for toxicity.    Aquiles Lopez MD  06/18/2025

## 2025-06-18 ENCOUNTER — LAB (OUTPATIENT)
Dept: LAB | Facility: HOSPITAL | Age: 80
End: 2025-06-18
Payer: MEDICARE

## 2025-06-18 ENCOUNTER — SPECIALTY PHARMACY (OUTPATIENT)
Dept: ONCOLOGY | Facility: HOSPITAL | Age: 80
End: 2025-06-18
Payer: MEDICARE

## 2025-06-18 ENCOUNTER — OFFICE VISIT (OUTPATIENT)
Dept: ONCOLOGY | Facility: CLINIC | Age: 80
End: 2025-06-18
Payer: MEDICARE

## 2025-06-18 VITALS
WEIGHT: 130.9 LBS | SYSTOLIC BLOOD PRESSURE: 135 MMHG | BODY MASS INDEX: 19.84 KG/M2 | TEMPERATURE: 97.3 F | OXYGEN SATURATION: 99 % | HEIGHT: 68 IN | DIASTOLIC BLOOD PRESSURE: 77 MMHG | HEART RATE: 73 BPM

## 2025-06-18 DIAGNOSIS — D45 POLYCYTHEMIA VERA: ICD-10-CM

## 2025-06-18 DIAGNOSIS — D45 POLYCYTHEMIA VERA: Primary | ICD-10-CM

## 2025-06-18 LAB
ALBUMIN SERPL-MCNC: 4.4 G/DL (ref 3.5–5.2)
ALBUMIN/GLOB SERPL: 1.7 G/DL
ALP SERPL-CCNC: 149 U/L (ref 39–117)
ALT SERPL W P-5'-P-CCNC: 21 U/L (ref 1–41)
ANION GAP SERPL CALCULATED.3IONS-SCNC: 11.6 MMOL/L (ref 5–15)
AST SERPL-CCNC: 32 U/L (ref 1–40)
BASOPHILS # BLD AUTO: 0.03 10*3/MM3 (ref 0–0.2)
BASOPHILS NFR BLD AUTO: 0.3 % (ref 0–1.5)
BILIRUB SERPL-MCNC: 1 MG/DL (ref 0–1.2)
BUN SERPL-MCNC: 43.7 MG/DL (ref 8–23)
BUN/CREAT SERPL: 55.3 (ref 7–25)
CALCIUM SPEC-SCNC: 10.1 MG/DL (ref 8.6–10.5)
CHLORIDE SERPL-SCNC: 105 MMOL/L (ref 98–107)
CO2 SERPL-SCNC: 25.4 MMOL/L (ref 22–29)
CREAT SERPL-MCNC: 0.79 MG/DL (ref 0.76–1.27)
DEPRECATED RDW RBC AUTO: 65.2 FL (ref 37–54)
EGFRCR SERPLBLD CKD-EPI 2021: 89.8 ML/MIN/1.73
EOSINOPHIL # BLD AUTO: 0.14 10*3/MM3 (ref 0–0.4)
EOSINOPHIL NFR BLD AUTO: 1.3 % (ref 0.3–6.2)
ERYTHROCYTE [DISTWIDTH] IN BLOOD BY AUTOMATED COUNT: 18.5 % (ref 12.3–15.4)
GLOBULIN UR ELPH-MCNC: 2.6 GM/DL
GLUCOSE SERPL-MCNC: 126 MG/DL (ref 65–99)
HCT VFR BLD AUTO: 51.5 % (ref 37.5–51)
HGB BLD-MCNC: 15.6 G/DL (ref 13–17.7)
IMM GRANULOCYTES # BLD AUTO: 0.1 10*3/MM3 (ref 0–0.05)
IMM GRANULOCYTES NFR BLD AUTO: 1 % (ref 0–0.5)
LYMPHOCYTES # BLD AUTO: 1.28 10*3/MM3 (ref 0.7–3.1)
LYMPHOCYTES NFR BLD AUTO: 12.2 % (ref 19.6–45.3)
MCH RBC QN AUTO: 30.1 PG (ref 26.6–33)
MCHC RBC AUTO-ENTMCNC: 30.3 G/DL (ref 31.5–35.7)
MCV RBC AUTO: 99.2 FL (ref 79–97)
MONOCYTES # BLD AUTO: 0.73 10*3/MM3 (ref 0.1–0.9)
MONOCYTES NFR BLD AUTO: 6.9 % (ref 5–12)
NEUTROPHILS NFR BLD AUTO: 78.3 % (ref 42.7–76)
NEUTROPHILS NFR BLD AUTO: 8.24 10*3/MM3 (ref 1.7–7)
NRBC BLD AUTO-RTO: 0 /100 WBC (ref 0–0.2)
PLATELET # BLD AUTO: 312 10*3/MM3 (ref 140–450)
PMV BLD AUTO: 9.7 FL (ref 6–12)
POTASSIUM SERPL-SCNC: 4.3 MMOL/L (ref 3.5–5.2)
PROT SERPL-MCNC: 7 G/DL (ref 6–8.5)
RBC # BLD AUTO: 5.19 10*6/MM3 (ref 4.14–5.8)
SODIUM SERPL-SCNC: 142 MMOL/L (ref 136–145)
WBC NRBC COR # BLD AUTO: 10.52 10*3/MM3 (ref 3.4–10.8)

## 2025-06-18 PROCEDURE — 3078F DIAST BP <80 MM HG: CPT | Performed by: INTERNAL MEDICINE

## 2025-06-18 PROCEDURE — 99213 OFFICE O/P EST LOW 20 MIN: CPT | Performed by: INTERNAL MEDICINE

## 2025-06-18 PROCEDURE — 1125F AMNT PAIN NOTED PAIN PRSNT: CPT | Performed by: INTERNAL MEDICINE

## 2025-06-18 PROCEDURE — 36415 COLL VENOUS BLD VENIPUNCTURE: CPT

## 2025-06-18 PROCEDURE — 3075F SYST BP GE 130 - 139MM HG: CPT | Performed by: INTERNAL MEDICINE

## 2025-06-18 PROCEDURE — 85025 COMPLETE CBC W/AUTO DIFF WBC: CPT

## 2025-06-18 PROCEDURE — 80053 COMPREHEN METABOLIC PANEL: CPT

## 2025-06-18 NOTE — PROGRESS NOTES
Specialty Pharmacy Patient Management Program  Oncology Initial Assessment     Madhu Santoro was referred by their provider to the Oncology Patient Management program offered by [facility] for polycythemia vera on 06/18/25.  An initial outreach was conducted, including assessment of therapy appropriateness and specialty medication education for Hydrea (hydroxyurea). The patient was introduced to services offered by [facility], including: regular assessments, refill coordination, curbside pick-up or mail order delivery options, prior authorization maintenance, and financial assistance programs as applicable. The patient was also provided with contact information for the pharmacy team.     Goal of chemotherapy: disease control    Treatment Medication(s) / Frequency and Dosing    Hydrea 500 mg daily    Number of cycles: until disease progression or unacceptable toxicity    Start date of oral specialty medication: patient already on medication    Follow-up Testing to be determined after TBD cycles by MD.       Completing Pharmacist: Julianne Vicente RPH             Date/time: 06/18/2025 08:52 EDT     Insurance Coverage & Financial Support  Approved.      Relevant Past Medical History and Comorbidities  Relevant medical history and concomitant health conditions were discussed with the patient. The patient's chart has been reviewed for relevant past medical history and comorbid conditions and updated as necessary.  Past Medical History:   Diagnosis Date    Anxiety     Arthritis     Atrial flutter     Status post cavotricuspid isthmus ablation by Dr. Gustafson on 4/18/17    Mandel esophagus     Benign essential hypertension     CAD (coronary artery disease)     3 vessel CABG 4/11/17 by Dr. Cortez: ROSARIO-prox LAD, SVG-OM1, SVG-OM3    Carotid artery disease     Status post carotid endarterectomy - [facility] 4/10/17: 50-59% NICHELLE, 1-15% LICA.     Colonic polyp     COVID-19 long  "hauler     Cyst of pancreas     DDD (degenerative disc disease), lumbosacral     GERD (gastroesophageal reflux disease)     H/O bone density study     H/O complete eye exam     History of kidney stone     HLD (hyperlipidemia)     Hypertension     Kidney stone     21    Lipid screening 2013    Low back pain     physical therapy [facility] 5-12-10    Parkinson disease     Screening for prostate cancer 2015    Skin cancer     nose    Stroke     RESIDUAL--\"BALANCE ISSUES\"     Social History     Socioeconomic History    Marital status:      Spouse name: Brooke    Years of education: 9th grade   Tobacco Use    Smoking status: Former     Current packs/day: 0.00     Average packs/day: 1 pack/day for 50.0 years (50.0 ttl pk-yrs)     Types: Cigarettes     Start date: 1959     Quit date: 2009     Years since quittin.4     Passive exposure: Past    Smokeless tobacco: Never    Tobacco comments:     CAFFEINE USE   Vaping Use    Vaping status: Never Used   Substance and Sexual Activity    Alcohol use: Not Currently     Comment: rarely    Drug use: No    Sexual activity: Defer     Partners: Female       Problem list reviewed by Julianne Vicente RPH on 2025 at  8:50 AM    Allergies  Known allergies and reactions were discussed with the patient. The patient's chart has been reviewed for  allergy information and updated as necessary.   Allergies   Allergen Reactions    Atorvastatin Myalgia     Myalgia    Penicillins Hives, Swelling and Rash     Tolerates cephalosporins     Beta lactam allergy details  Antibiotic reaction: hives, rash, other (swelling)  Age at reaction: unknown  Dose to reaction time: unknown  Reason for antibiotic: unknown  Epinephrine required for reaction?: unknown  Tolerated antibiotics: unknown          Allergies reviewed by Julianne Vicente RPH on 2025 at  8:49 AM    Relevant Laboratory Values  Relevant laboratory values were discussed with the " patient. The following specialty medication dose adjustment(s) are recommended: no dose adjustments  Lab Results   Component Value Date    GLUCOSE 126 (H) 06/18/2025    CALCIUM 10.1 06/18/2025     06/18/2025    K 4.3 06/18/2025    CO2 25.4 06/18/2025     06/18/2025    BUN 43.7 (H) 06/18/2025    CREATININE 0.79 06/18/2025    EGFRIFAFRI 75 02/08/2022    EGFRIFNONA 65 02/08/2022    BCR 55.3 (H) 06/18/2025    ANIONGAP 11.6 06/18/2025     Lab Results   Component Value Date    WBC 10.52 06/18/2025    RBC 5.19 06/18/2025    HGB 15.6 06/18/2025    HCT 51.5 (H) 06/18/2025    MCV 99.2 (H) 06/18/2025    MCH 30.1 06/18/2025    MCHC 30.3 (L) 06/18/2025    RDW 18.5 (H) 06/18/2025    RDWSD 65.2 (H) 06/18/2025    MPV 9.7 06/18/2025     06/18/2025    NEUTRORELPCT 78.3 (H) 06/18/2025    LYMPHORELPCT 12.2 (L) 06/18/2025    MONORELPCT 6.9 06/18/2025    EOSRELPCT 1.3 06/18/2025    BASORELPCT 0.3 06/18/2025    AUTOIGPER 1.0 (H) 06/18/2025    NEUTROABS 8.24 (H) 06/18/2025    LYMPHSABS 1.28 06/18/2025    MONOSABS 0.73 06/18/2025    EOSABS 0.14 06/18/2025    BASOSABS 0.03 06/18/2025    AUTOIGNUM 0.10 (H) 06/18/2025    NRBC 0.0 06/18/2025       Current Medication List  This medication list has been reviewed with the patient and evaluated for any interactions or necessary modifications/recommendations, and updated to include all prescription medications, OTC medications, and supplements the patient is currently taking.  This list reflects what is contained in the patient's profile, which has also been marked as reviewed to communicate to other providers it is the most up to date version of the patient's current medication therapy.     Current Outpatient Medications:     allopurinol (ZYLOPRIM) 300 MG tablet, Take 1 tablet by mouth Daily., Disp: 30 tablet, Rfl: 0    apixaban (ELIQUIS) 2.5 MG tablet tablet, Take 1 tablet by mouth Every 12 (Twelve) Hours. Indications: Atrial Fibrillation, Disp: , Rfl:     carbidopa-levodopa  (SINEMET)  MG per tablet, Take 1 tablet by mouth 4 (Four) Times a Day for 30 days., Disp: 120 tablet, Rfl: 4    gabapentin (NEURONTIN) 100 MG capsule, Take 1 capsule by mouth 2 (Two) Times a Day. (Patient not taking: Reported on 6/18/2025), Disp: , Rfl:     HYDROcodone-acetaminophen (NORCO) 5-325 MG per tablet, Take 1 tablet by mouth Every 8 (Eight) Hours As Needed for Severe Pain or Moderate Pain. 30 day supply., Disp: 90 tablet, Rfl: 0    hydroxyurea (Hydrea) 500 MG capsule, Take 1 capsule by mouth Daily., Disp: 30 capsule, Rfl: 3    lisinopril (PRINIVIL,ZESTRIL) 5 MG tablet, TAKE ONE TABLET BY MOUTH DAILY, Disp: 180 tablet, Rfl: 0    Nutritional Supplements (OSMOLITE 1.5 GIANNI PO), 240 Units/oz/day by Gastrostomy Tube route 6 (Six) Times a Day. (Patient not taking: Reported on 6/18/2025), Disp: , Rfl:     rosuvastatin (CRESTOR) 20 MG tablet, TAKE ONE TABLET BY MOUTH NIGHTLY, Disp: 90 tablet, Rfl: 0    Medicines reviewed by Julianne Vicente ADRIENNE on 6/18/2025 at  8:49 AM    Drug Interactions  Reviewed concomitant medications, allergies, labs, comorbidities/medical history, quality of life, and immunization history.   Drug-drug interactions noted and discussed during education: no significant drug interactions noted. . Reminded the patient to let us know before making any changes or starting any new prescription or OTC medications so we can first assess drug interactions.  Drug-food interactions noted and discussed during education: Patient was instructed to avoid no foods or drinks.    Initial Education Provided for Specialty Medication  The patient has been provided with the following education and any applicable administration techniques (i.e. self-injection) have been demonstrated for the therapies indicated. All questions and concerns have been addressed prior to the patient receiving the medication, and the patient has verbalized comprehension of the education and any materials provided. Additional  patient education shall be provided and documented upon request by the patient, provider, or payer.    Previously provided patient with:   Education sheets about the medication        TOPICS COMMENTS   Storage and Handling of Oral Specialty Medication Store in the original container, in a dry location out of direct sunlight, and out of reach of children or pets.  Discussed safe handling and what to do with any unused medication.   Administration of Oral Specialty Medication Take with or without food at the same time(s) each day.   Adherence to Oral Specialty Regimen and Handling Missed Doses Patient is likely to have good treatment adherence; reinforced the importance of adherence. Reviewed how to address missed doses and to let us know of any missed doses.   Anemia: role of RBC, cause, s/s, ways to manage, role of transfusion Reviewed the role of RBC and the use of transfusions if hemoglobin decreases too much.  Patient to notify us if they experience shortness of breath, dizziness, or palpitations.  Also let patient know they could feel more tired than usual and to try to stay active, but rest if they need to.    Thrombocytopenia: role of platelet, cause, s/s, ways to prevent bleeding, things to avoid, when to seek help Reviewed the role of platelets in blood clotting and when to call clinic (bloody nose that bleeds for 5 mins despite pressure, a cut that won't stop bleeding despite pressure, gums that bleed excessively with brushing or flossing). Recommended using an electric razor, soft bristle toothbrush, and blowing your nose gently.    Neutropenia: role of WBC, cause, infection precautions, s/s of infection, when to call MD Reviewed the role of WBC, good infection prevention practices, and when to call the clinic (temperature 100.4F, sore throat, burning urination, etc)   Miscellaneous Financial Issues: none  Lab Draws: per MD   Infertility and Sexuality:  causes, fertility preservation options, sexuality  changes, ways to manage, importance of birth control The patient is not sexually active with a woman of childbearing potential.   Home Care: how to manage bodily fluids Counseled on management of soiled linens and proper flush technique.  Discussed how to manage all the side effects at home and advised when to contact the MD office       Adherence and Self-Administration  Adherence related to the patient's specialty therapy was discussed with the patient. The Adherence segment of this outreach has been reviewed and updated.     Is there a concern with patient's ability to self administer the medication correctly and without issue?: No  Were any potential barriers to adherence identified during the initial assessment or patient education?: No  Are there any concerns regarding the patient's understanding of the importance of medication adherence?: No  Methods for Supporting Patient Adherence and/or Self-Administration: directed education  Expected duration of therapy: Until disease progression or intolerable toxicity    Open Medication Therapy Problems  No medication therapy recommendations to display    Goals of Therapy  Goals related to the patient's specialty therapy were discussed with the patient. The Patient Goals segment of this outreach has been reviewed and updated.   Goals Addressed Today        Specialty Pharmacy General Goal      Maintain CBC WNL    6/18/25: CBC from today improved.  WBC 10.52, Hgb 15.6, platelets 312.  No changes to oral therapy plan at this time            Wrap up  Discussed aforementioned material with patient in person, face-to-face, in clinic.   Chemo consents/CCA previously signed.  Medication availability: patient already has medication on hand  Patient and his wife, present in today's appointment, expressed understanding.  Patient demonstrates ability to self-administer medication. No barriers to adherence identified.  All questions and concerns addressed.     Reassessment Plan &  Follow-Up  1. Medication Therapy Changes: none  2. Related Plans, Therapy Recommendations, or Therapy Problems to Be Addressed: none  3. Pharmacist to perform regular assessments no more than (6) months from the previous assessment.      Attestation  Therapeutic appropriateness: Appropriate   I attest the patient was actively involved in and has agreed to the above plan of care. If the prescribed therapy is at any point deemed not appropriate based on the current or future assessments, a consultation will be initiated with the patient's specialty care provider to determine the best course of action. The revised plan of therapy will be documented along with any required assessments and/or additional patient education provided.     eJrad Vicente, Janie, [facility]  Clinical Specialty Pharmacist, Oncology  6/18/2025  08:53 EDT

## 2025-06-18 NOTE — LETTER
June 18, 2025     Damian Sanchez MD  [id number] [facility]  [address]  [facility] [address]    Patient: Madhu Santoro   YOB: 1945   Date of Visit: 6/18/2025     Dear Damian Sanchez MD:       Thank you for referring Madhu Santoro to me for evaluation. Below are the relevant portions of my assessment and plan of care.    If you have questions, please do not hesitate to call me. I look forward to following Madhu along with you.         Sincerely,        Aquiles Lopez MD        CC: MD John Gomes II, Michael D., MD  06/18/25 0901  Sign when Signing Visit    REASON FOR FOLLOW-UP: Polycythemia vera, JAK2 mutation detected    HISTORY OF PRESENT ILLNESS:   The patient is a 79 y.o. male with the above-mentioned history, who who had last been assessed 5/7/2025 .He continues on Hydrea 500mg daily.  He overall tolerates this well.  He did recently have a leak of his feeding tube.  Ultimately, his feeding tube was removed and the patient is attempting to maintain his nutritional intake by mouth.  He otherwise has no new concerns.  His wife does question ongoing use of gabapentin.  It appears this was previously discontinued during hospitalization though they have continued to take this.  This is historically been managed by neurology.  We made plans to continue Hydrea, adjusted to gabapentin, continue low-dose Eliquis and a 6-week follow-up was scheduled.    He is now seen 6/18/2025.  He remains quite weak and fatigued but able to function, G-tube now removed.  He has questions about gabapentin use which had previously helped his leg pain and hopes to have this assessed and addressed by Dr. Russell next week.  We would have no objection to using gabapentin at low-dose from our standpoint.  Hematologically he remains well-controlled on his low-dose Hydrea and will not require phlebotomy.    Hematologic/oncologic history:     The patient is a 79-year-old male followed with a number of issues  including coronary artery disease, status post CABG, atrial flutter, previous CVA hyperlipidemia, GE reflux, carotid vascular disease, and hypertension.     He had been seen by vascular 2/25/2022 with mild progression of carotid disease but otherwise stable and also had follow-up with pain management for back pain 3/28/2022 requiring chronic narcotic therapy.  Patient has been resistant to epidural like procedures.     Unfortunately has had concurrent constipation requiring laxatives and additional opioid antagonist to reduce GI hypomotility.  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     Patient recently reviewed again by pain management with worsening pain.  Patient also saw GI periodically undergoing a follow-up MRI of the abdomen 8/9/2022 with scattered pancreatic cystic lesions unchanged from previous.  He had previously undergone EGD and colonoscopy 11/5/2021 with irregular Z-line and biopsies taken in nonbleeding internal hemorrhoids found during retroflexion that were small.  There are scattered small and large mouth diverticula found in the sigmoid colon descending colon and splenic flexure.       The patient now presents for thrombocytosis with review of his record over the last year demonstrating a platelet count that is escalated from 3-400,000 now to 8/11/2022 648,000 but concurrent microcytic and hypochromic indices.  These indices have been slowly reducing over the last 2 years approximately followed more closely.        These findings are discussed with the patient 8/16/2022 who indicates that he actually feels about the same though still has issues with back pain.  He is noted no change in bowel habit including, fortunately, improvement of his constipation without the use of additional medication such as Movantik.  He has not noted any  change in the color of his stool including dark stools or any blood per rectum, urine though he does note periodic excess bruising from his anticoagulation therapy.       The patient moved to a trial of iron which, unfortunately, worsened his constipation in which he had discontinue. He had further issues in early September with left renal stone, requiring cystoscopy, stent placement 8/23/2022.       He is next seen back 9/28/2022 with repeat studies performed 9/21 demonstrating 5% iron saturation, ferritin of 12.5.  He remains further weight and fatigue, again oral iron intolerant and will require IV iron preparations for his iron deficiency anemia.      The patient is next seen 1/18/2023 with considerable improvement in his testing including H&H now 17.2 and 55.4 though with iron saturation of 8% and ferritin 26.5.  He has not noted any blood loss but remains generally weak and with ongoing periodic abdominal pain.  His major concerns continue to be a disequilibrium since his previous CVA symptoms.  He has not been seen in follow-up by neurology.  He continues to have hematuria by urinary assessment but not gross findings.  We have discussed that he may actually have a myeloproliferative disorder and requested that he undergo an assessment for JAK2 analysis today.    He is seen back 4/17/2023JAK  2-V617 F mutation having been detected.  In the interval he was admitted 2/19-21/23 for weakness, fall and ER evaluation not demonstrate any acute intracranial process, CT of lumbar spine with lumbar degenerative disease and other studies not show any acute abnormalities.  Fortunately he went on to improve though his wife had a positive COVID test recently on home examination.  His follow-up testing 4/10/2023 include an H&H of 18.3 and 60.9 white count of 14,700 platelet count of 477,000.    He is seen 4/17/2023 and we discussed that he is better served with phlebotomy at this point.  He states he feels so poorly that he  is quite willing to try to move to additional therapies including phlebotomy.    Patient initiating Hydrea 1000 mg daily as of 5/19/2023.    He is next seen back in office 6/30/2023.  He is gradually seen an improvement in his hemoglobin hematocrit dropping to a normal CBC approximately 6/30/2023 H&H of 14.1 and 46.6, white count of 4800 and platelet count of 146,000.  He is tolerating treatment well without additional side effects.    Patient seen 9/21/2023 with H&H 11.1 and 32.9, white count 5110, platelet count 151,000.  Patient without additional side effect from Hydrea though dose is now reduced to 500 mg every other day.    He was next seen in office 4/12/2024 after complications following COVID development late last year.  He has been hospitalized several times, and rehab several times and had evidence hemolysis leading to discontinuance of his Hydrea.  He was last seen in mid January stable hematologically.  Unfortunately we did not have additional recommendations as he tried to recover from the infection.        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  Hematologically, fortunately, he remained stable and is off Hydrea.    He is next seen 7/24/2024 reasonably stable hematologically.  At present no intervention is necessary.    Patient evaluated 3/26/2025 not requiring phlebotomy, stable on current Hydrea dosing.    Patient reassessed 6/18/2025 stable on current Hydrea dosing.  Past Medical History:   Diagnosis Date   • Anxiety    • Arthritis    • Atrial flutter     Status post cavotricuspid isthmus ablation by Dr. Gustafson on 4/18/17   • Mandel esophagus    • Benign essential hypertension    • CAD (coronary artery disease)     3 vessel CABG 4/11/17 by Dr. Cortez: ROSARIO-prox LAD, SVG-OM1, SVG-OM3   • Carotid artery disease     Status post carotid endarterectomy -  "US 4/10/17: 50-59% NICHELLE, 1-15% LICA.    • Colonic polyp    • COVID-19 long hauler    • Cyst of pancreas    • DDD (degenerative disc disease), lumbosacral    • GERD (gastroesophageal reflux disease)    • H/O bone density study 2013   • H/O complete eye exam 2014   • History of kidney stone    • HLD (hyperlipidemia)    • Hypertension    • Kidney stone     8/22/22   • Lipid screening 05/31/2013   • Low back pain     physical therapy [facility] 5-12-10   • Parkinson disease    • Screening for prostate cancer 07/07/2015   • Skin cancer     nose   • Stroke     RESIDUAL--\"BALANCE ISSUES\"        Past Surgical History:   Procedure Laterality Date   • CARDIAC CATHETERIZATION N/A 04/10/2017    Procedure: Left Heart Cath;  Surgeon: Marjorie Healy MD;  Location: [facility] CATH INVASIVE LOCATION;  Service:    • CARDIAC CATHETERIZATION N/A 04/10/2017    Procedure: Coronary angiography;  Surgeon: Marjorie Healy MD;  Location: [facility] CATH INVASIVE LOCATION;  Service:    • CARDIAC CATHETERIZATION N/A 04/10/2017    Procedure: Left ventriculography;  Surgeon: Marjorie Healy MD;  Location: [facility] CATH INVASIVE LOCATION;  Service:    • CARDIAC CATHETERIZATION  2011   • CARDIAC ELECTROPHYSIOLOGY PROCEDURE N/A 04/18/2017    Procedure: Ablation atrial flutter;  Surgeon: Jose Antonio Gustafson MD;  Location: [facility] CATH INVASIVE LOCATION;  Service:    • CAROTID ENDARTERECTOMY     • CHOLECYSTECTOMY     • CHOLECYSTECTOMY WITH INTRAOPERATIVE CHOLANGIOGRAM N/A 09/07/2019    Procedure: Laparoscopic cholecystectomy with intraoperative cholangiogram;  Surgeon: Gauri Travis MD;  Location: [facility] MAIN OR;  Service: General   • COLONOSCOPY  01/06/2015    Diverticulosis, one TA   • COLONOSCOPY N/A 02/14/2019    tics, NBIH, adenomatous polyp x 2   • COLONOSCOPY N/A 11/05/2021    Procedure: COLONOSCOPY TO CECUM AND TERM. ILEUM WITH COLD POLYPECTOMIES;  Surgeon: Everton Abel MD;  Location: [facility] ENDOSCOPY;  Service: Gastroenterology;  Laterality: " N/A;  PRE OP - PERS H/O POLYPS  POST OP - COLON POLYPS,, DIVERTICULOSIS, HEMORRHOIDS   • CORONARY ARTERY BYPASS GRAFT N/A 04/11/2017    Procedure: AR STERNOTOMY CORONARY ARTERY BYPASS GRAFT TIMES 3 USING LEFT INTERNAL MAMMARY ARTERY AND LEFT GREATER SAPHENOUS VEIN GRAFT PER ENDOSCOPIC VEIN HARVESTING AND PRP ;  Surgeon: Temo Cortez MD;  Location: [facility] MAIN OR;  Service:    • ENDOSCOPY  01/06/2015    HH, Ervin's esophagus   • ENDOSCOPY N/A 02/14/2019    Z line irregular, HH, Ervin's esophagus   • ENDOSCOPY N/A 11/05/2021    Procedure: ESOPHAGOGASTRODUODENOSCOPY WITH BIOPSIES;  Surgeon: Everton Abel MD;  [facility];  Service: Gastroenterology;  Laterality: N/A;  PRE OP - PERS H/O ERVIN'S  POST OP - IRREG Z LINE   • ENDOSCOPY W/ PEG TUBE PLACEMENT N/A 11/6/2023    Procedure: ESOPHAGOGASTRODUODENOSCOPY WITH PERCUTANEOUS ENDOSCOPIC GASTROSTOMY TUBE INSERTION;  Surgeon: Temo Ramsey MD;  Location: [facility] ENDOSCOPY;  Service: General;  Laterality: N/A;  Pre: dysphagia  Post: same   • KNEE SURGERY Left    • URETEROSCOPY LASER LITHOTRIPSY WITH STENT INSERTION Left 8/23/2022    Procedure: Cysto retrograde with left uretro stent placement;  Surgeon: Damien Oliveira MD;  Location: [facility] MAIN OR;  Service: Urology;  Laterality: Left;   • VASECTOMY          Current Outpatient Medications on File Prior to Visit   Medication Sig Dispense Refill   • allopurinol (ZYLOPRIM) 300 MG tablet Take 1 tablet by mouth Daily. 30 tablet 0   • apixaban (ELIQUIS) 2.5 MG tablet tablet Take 1 tablet by mouth Every 12 (Twelve) Hours. Indications: Atrial Fibrillation     • HYDROcodone-acetaminophen (NORCO) 5-325 MG per tablet Take 1 tablet by mouth Every 8 (Eight) Hours As Needed for Severe Pain or Moderate Pain. 30 day supply. 90 tablet 0   • hydroxyurea (Hydrea) 500 MG capsule Take 1 capsule by mouth Daily. 30 capsule 3   • lisinopril (PRINIVIL,ZESTRIL) 5 MG tablet TAKE ONE TABLET BY MOUTH DAILY  180 tablet 0   • rosuvastatin (CRESTOR) 20 MG tablet TAKE ONE TABLET BY MOUTH NIGHTLY 90 tablet 0   • carbidopa-levodopa (SINEMET)  MG per tablet Take 1 tablet by mouth 4 (Four) Times a Day for 30 days. 120 tablet 4   • gabapentin (NEURONTIN) 100 MG capsule Take 1 capsule by mouth 2 (Two) Times a Day. (Patient not taking: Reported on 2025)     • Nutritional Supplements (OSMOLITE 1.5 GIANNI PO) 240 Units/oz/day by Gastrostomy Tube route 6 (Six) Times a Day. (Patient not taking: Reported on 2025)       No current facility-administered medications on file prior to visit.        ALLERGIES:    Allergies   Allergen Reactions   • Atorvastatin Myalgia     Myalgia   • Penicillins Hives, Swelling and Rash     Tolerates cephalosporins     Beta lactam allergy details  Antibiotic reaction: hives, rash, other (swelling)  Age at reaction: unknown  Dose to reaction time: unknown  Reason for antibiotic: unknown  Epinephrine required for reaction?: unknown  Tolerated antibiotics: unknown           Social History     Socioeconomic History   • Marital status:      Spouse name: Brooke   • Years of education: 9th grade   Tobacco Use   • Smoking status: Former     Current packs/day: 0.00     Average packs/day: 1 pack/day for 50.0 years (50.0 ttl pk-yrs)     Types: Cigarettes     Start date: 1959     Quit date: 2009     Years since quittin.4     Passive exposure: Past   • Smokeless tobacco: Never   • Tobacco comments:     CAFFEINE USE   Vaping Use   • Vaping status: Never Used   Substance and Sexual Activity   • Alcohol use: Not Currently     Comment: rarely   • Drug use: No   • Sexual activity: Defer     Partners: Female        Family History   Problem Relation Age of Onset   • Hypertension Mother    • Heart disease Mother    • Heart attack Mother    • Stroke Mother    • Heart disease Father    • Heart attack Father    • Stroke Father    • Hypertension Sister    • Heart attack Brother    • Heart disease  "Brother    • No Known Problems Brother    • Heart disease Brother    • Heart attack Brother    • Diabetes Brother    • No Known Problems Maternal Grandmother    • No Known Problems Maternal Grandfather    • No Known Problems Paternal Grandmother    • No Known Problems Paternal Grandfather    • Pancreatic cancer Paternal Cousin    • Arthritis Other    • Diabetes Other    • Hypertension Other    • Kidney disease Other         stones   • Malig Hyperthermia Neg Hx         Objective    Vitals:    06/18/25 0822   BP: 135/77   Pulse: 73   Temp: 97.3 °F (36.3 °C)   TempSrc: Skin   SpO2: 99%   Weight: 59.4 kg (130 lb 14.4 oz)   Height: 172.7 cm (67.99\")   PainSc: 10-Worst pain ever   PainLoc: Generalized  Comment: Legs, Feet                 6/18/2025     8:26 AM   Current Status   ECOG score 1       Physical Exam  Vitals reviewed.   Constitutional:       Appearance: Normal appearance. He is well-developed.      Comments: Using a walker.    HENT:      Head: Normocephalic and atraumatic.   Eyes:      Pupils: Pupils are equal, round, and reactive to light.   Cardiovascular:      Rate and Rhythm: Normal rate.   Pulmonary:      Effort: Pulmonary effort is normal. No respiratory distress.      Breath sounds: Normal breath sounds.   Abdominal:      Palpations: Abdomen is soft.      Comments: G-tube removed, exit site healed   Musculoskeletal:         General: Normal range of motion.      Cervical back: Normal range of motion.   Skin:     General: Skin is warm and dry.      Findings: No rash.   Neurological:      Mental Status: He is alert and oriented to person, place, and time.         RECENT LABS:  Results from last 7 days   Lab Units 06/18/25  0819   WBC 10*3/mm3 10.52   NEUTROS ABS 10*3/mm3 8.24*   HEMOGLOBIN g/dL 15.6   HEMATOCRIT % 51.5*   PLATELETS 10*3/mm3 312         Results from last 7 days   Lab Units 06/18/25  0819   SODIUM mmol/L 142   POTASSIUM mmol/L 4.3   CHLORIDE mmol/L 105   CO2 mmol/L 25.4   BUN mg/dL 43.7* "   CREATININE mg/dL 0.79   CALCIUM mg/dL 10.1   ALBUMIN g/dL 4.4   BILIRUBIN mg/dL 1.0   ALK PHOS U/L 149*   ALT (SGPT) U/L 21   AST (SGOT) U/L 32   GLUCOSE mg/dL 126*         Assessment plan:  *Polycythemia vera now now with likely hemolytic anemia  Patient initially presented to hematology August 2022 for thrombocytosis with review of his record over the last year demonstrating a platelet count that is escalated from 3-400,000 now to 8/11/2022 648,000 but concurrent microcytic and hypochromic indices.  These indices have been slowly reducing over the last 2 years approximately followed more closely.  Concurrently is a mild drop in his baseline hemoglobin still well within normal limits.  thrombocytosis thought, initially, to be related to iron deficiency.  Ferritin found to be 16.3 and iron of 26 with 5% saturation and TIBC 511.   The patient was placed on ferrous gluconate which, unfortunately, he was unable to tolerate with worsening constipation.  He had further issues in early September with left renal stone, requiring cystoscopy, stent placement 8/23/2022.  9/28/2022 with repeat studies performed 9/21 demonstrating 5% iron saturation, ferritin of 12.5.  He remains further weight and fatigue, again oral iron intolerant and will require IV iron preparations for his iron deficiency anemia.  We discontinued oral iron and proceeded with Injectafer given 9/28/2022 in 10/5/2022  4/17/2023JAK  2-V617 F mutation having been detected.    In the interval he was admitted 2/19-21/23 for weakness, fall and ER evaluation not demonstrate any acute intracranial process, CT of lumbar spine with lumbar degenerative disease and other studies not show any acute abnormalities.  Fortunately he went on to improve though his wife had a positive COVID test recently on home examination.  4/10/2023 include an H&H of 18.3 and 60.9 white count of 14,700 platelet count of 477,000.  5/15/2023 hemoglobin 15.3, hematocrit 50.4.  Reviewed with  Dr. Lopez plans to hold off on phlebotomy and initiate Hydrea 1000 mg daily.  We will tentatively schedule him for return follow-up visit in 2 weeks with repeat labs and reassessment.  6/1/2020 2:23 weeks of Hydrea 1000 mg daily, WBC 5.0, hemoglobin 15.2, hematocrit 49.8, platelets 113,000.  Hydrea was reduced to 500 mg daily  Stability of counts today, 6/15/2023 on 500 mg daily with WBC 4.81, hemoglobin 14.6, hematocrit 46.7%, platelets 156,000  Patient seen 6/30/2023 with H&H of 14.1 and 46.6 white count of 4800 and platelet count of 1 46,000.  Patient subsequently assessed including 9/21/2023 with H&H gradually dropping 11.1 and 32.9, white count of 5110, platelet count 151,000, .8.  10/20/2023 WBC 8.04, SNC 5.42, Hgb 13.1, Platelets 247,000.  Continue Hydrea 500 mg every other day.  11/9/2023-hospitalized with COVID-pneumonia and severe deconditioningdischarged to nursing home on 11/9/2023 off Hydrea but on Eliquis  Evidence of hemolysis in 12/23 with negative PNH workup and very low titer cold agglutinins  CBC stable - hold hydrea  Assessed 4/12/2024-stable hematologically, Hydrea held  7/2024: This assessment despite leukocytosis and thrombocytosis his anemia is not worsening and intervention with treatment such as Hydrea phlebotomy is not necessary as of yet.  1/21/2025: WBC 33.01, platelets elevated to 837,000 today. Over the last several months, blood counts have continued to increase while hydrea has been held. Reviewed results with Dr. Lopez who advised restarting hydrea 500mg daily.   2/18/2025: WBC 18.32, hemoglobin 15.5, platelets 664,000. Continue hydrea 500mg daily.   Reassessment 3/26/2025 with H&H of 15.7 and 53.5 with white count 11,030 platelet count of 2 55,000, stable on current Hydrea dosing  5/7/2025 patient returns continuing on Hydrea 500 mg daily with WBC 8.25, hemoglobin 15.8, hematocrit 51.4%, platelets 270,000  Patient reviewed 6/18/2025-H&H 15.6 and 51.5, platelet count of  312,000.    *Hospitalized in mid October to early November with COVID-pneumonia with profound deconditioning discharged to nursing home  Readmitted 11/22/2023 with anemia-patient has not been on hydroxyurea at discharge from the hospital and remains on Eliquis  MCV is dropped significantly suggesting iron depletion and blood loss (but also may be related to being off Hydrea)  Patient himself denies hematochezia or melena  B12 folate normal ferritin 566 iron saturation 12% reticulocyte count 9% rule out hemolysis although acute blood loss can also cause an elevated reticulocyte  LDH elevated 356 haptoglobin low at 24-suggest hemolysis suggest hemolysis   Crossmatch compatible suggesting indirect claudia neg  Direct claudia Complement +?  Cold agglutinin versus PNH-PNH profile and cold agglutinin titer pending  Hemoglobin stable 11/27/2023-9.2  CT abdomen/pelvis 11/27/2023-suggestive of lower lobe pneumonia, stable pancreatic lesions probable IPMN, no evidence of bleeding  Discharged from rehab on Friday and readmitted with fall and difficulty walking 4 days later-CBC stable  Cold agglutinins 1:32 PNH negative  Patient seen 4/12/24 stable hematologically, Hydrea not reinstituted  Reassessment 7/24/2024 again stable hematologically.  Assessed 6/18/2025 clinically  stable.      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 Continue on Sinemet.  Recent worsening symptoms on combined therapy 1/3/1/25  Patient reports difficulty getting gabapentin refilled.  It appears this was discontinued during hospitalization February 2025.  I will reach out to neurology to determine if this needs to be continued.  Neurologic follow-up anticipated June 2025, issues to be addressed include current medications include reinstitution of Neurontin.  A copy of this note will be sent to that physician.    PLAN:   Continue current dose of Hydrea 500 mg daily  Again plans  to discuss with neurology the need for ongoing gabapentin  Continue low-dose Eliquis per cardiology  Return in 6 weeks for CBC, 12 weeks NP, CBC    Patient is on a high risk medication requiring close monitoring for toxicity.    Aquiles Lopez MD  06/18/2025

## 2025-06-19 NOTE — PROGRESS NOTES
Specialty Pharmacy Note: Hydrea (hydroxyurea)    Madhu Santoro is a 79 y.o. male with polycythemia vera was seen 6/18/25 by Dr. Lopez. Per provider dictation, no changes to oral oncology regimen Hydrea 500 mg daily.  Labs Review: The CMP and CBC from 6/18/25 have been reviewed. No dose adjustments are needed for the oral specialty medication(s) based on the labs.    Specialty pharmacy will continue to follow patient.    Jerad Vicente, Janie, [facility]  Clinical Oncology Pharmacist    6/19/2025  08:46 EDT

## 2025-06-23 ENCOUNTER — OFFICE VISIT (OUTPATIENT)
Dept: PAIN MEDICINE | Facility: CLINIC | Age: 80
End: 2025-06-23
Payer: MEDICARE

## 2025-06-23 VITALS
DIASTOLIC BLOOD PRESSURE: 69 MMHG | WEIGHT: 129 LBS | HEART RATE: 61 BPM | OXYGEN SATURATION: 98 % | BODY MASS INDEX: 19.55 KG/M2 | TEMPERATURE: 96.9 F | RESPIRATION RATE: 18 BRPM | HEIGHT: 68 IN | SYSTOLIC BLOOD PRESSURE: 131 MMHG

## 2025-06-23 DIAGNOSIS — G89.29 CHRONIC PAIN OF BOTH HIPS: ICD-10-CM

## 2025-06-23 DIAGNOSIS — M54.50 CHRONIC BILATERAL LOW BACK PAIN WITHOUT SCIATICA: ICD-10-CM

## 2025-06-23 DIAGNOSIS — G89.29 CHRONIC BILATERAL LOW BACK PAIN WITHOUT SCIATICA: ICD-10-CM

## 2025-06-23 DIAGNOSIS — M47.816 LUMBAR FACET ARTHROPATHY: ICD-10-CM

## 2025-06-23 DIAGNOSIS — M25.552 CHRONIC PAIN OF BOTH HIPS: ICD-10-CM

## 2025-06-23 DIAGNOSIS — M25.50 ARTHRALGIA OF MULTIPLE JOINTS: Primary | ICD-10-CM

## 2025-06-23 DIAGNOSIS — M25.551 CHRONIC PAIN OF BOTH HIPS: ICD-10-CM

## 2025-06-23 DIAGNOSIS — E78.5 HYPERLIPIDEMIA, UNSPECIFIED HYPERLIPIDEMIA TYPE: Chronic | ICD-10-CM

## 2025-06-23 DIAGNOSIS — M51.370 DEGENERATION OF INTERVERTEBRAL DISC OF LUMBOSACRAL REGION WITH DISCOGENIC BACK PAIN: ICD-10-CM

## 2025-06-23 DIAGNOSIS — Z79.899 ENCOUNTER FOR LONG-TERM (CURRENT) USE OF HIGH-RISK MEDICATION: ICD-10-CM

## 2025-06-23 LAB
POC AMPHETAMINES: NEGATIVE
POC BARBITURATES: NEGATIVE
POC BENZODIAZEPHINES: NEGATIVE
POC BUPRENORPHINE: NEGATIVE
POC COCAINE: NEGATIVE
POC METHADONE: NEGATIVE
POC METHAMPHETAMINE SCREEN URINE: NEGATIVE
POC OPIATES: POSITIVE
POC OXYCODONE: NEGATIVE
POC PHENCYCLIDINE: NEGATIVE
POC PROPOXYPHENE: NEGATIVE
POC THC: NEGATIVE
POC TRICYCLIC ANTIDEPRESSANTS: NEGATIVE

## 2025-06-23 PROCEDURE — 1159F MED LIST DOCD IN RCRD: CPT

## 2025-06-23 PROCEDURE — 99214 OFFICE O/P EST MOD 30 MIN: CPT

## 2025-06-23 PROCEDURE — 80305 DRUG TEST PRSMV DIR OPT OBS: CPT

## 2025-06-23 PROCEDURE — 1125F AMNT PAIN NOTED PAIN PRSNT: CPT

## 2025-06-23 PROCEDURE — 3075F SYST BP GE 130 - 139MM HG: CPT

## 2025-06-23 PROCEDURE — 1160F RVW MEDS BY RX/DR IN RCRD: CPT

## 2025-06-23 PROCEDURE — 3078F DIAST BP <80 MM HG: CPT

## 2025-06-23 RX ORDER — ROSUVASTATIN CALCIUM 20 MG/1
20 TABLET, COATED ORAL
Qty: 30 TABLET | Refills: 0 | Status: SHIPPED | OUTPATIENT
Start: 2025-06-23

## 2025-06-23 RX ORDER — HYDROCODONE BITARTRATE AND ACETAMINOPHEN 7.5; 325 MG/1; MG/1
1 TABLET ORAL EVERY 8 HOURS PRN
Qty: 90 TABLET | Refills: 0 | Status: SHIPPED | OUTPATIENT
Start: 2025-06-23

## 2025-06-23 NOTE — PROGRESS NOTES
"CHIEF COMPLAINT  Back pain and joint pain    Subjective   Madhu Santoro is a 79 y.o. male  who presents for follow-up.  He has a history of chronic back and joint pain. He reports that his pain has worsened since his last office visit.    Today pain is 9/10VAS in severity (no acute distress noted at this time). He continues to complain of low back and radiates down right lateral leg. Pain is also located throughout multiple joints (hands, feet, knees). He notes numbness in bilateral feet. Describes this pain as a nearly continuous aching, burning, and throbbing. Pain is worsened by prolonged walking or standing, increased physical activity, and bending/twisitng. Pain improves with rest/reposition, use of a heating pad, and medication.      Continues with Hydrocodone 5mg 3/day and OTC Tylenol PRN. He states he feels like this medication does not last long. Denies any side side effects from the regimen including somnolence and constipation. The regimen helps \"take the edge off\". Notes improvement in activity and function with regimen. ADL's by self. Denies any bowel or bladder changes. He takes Allopurinol for gout (Dr. Sanchez), and Sinemet (MY Geller). He is no longer taking Elavil.      Not a candidate for NSAIDs - history of TIA's and remains on Eliquis (prescribed by Dr. Sanchez)     Unable to tolerate Pregabalin - caused BLE swelling    Followed by Dr. Lopez for polycythemia vera. Last seen on 6/18/25. Plan was continue Hydrea. Dr. Lopez was going to discuss the need for Gabapentin with Neurology. He is scheduled to see Dr. Russell with Neurology on 6/26/25.      Back Pain  Chronicity:  Chronic  Onset:  More than 1 year ago  Frequency:  Constantly  Progression since onset:  Worsening  Pain location:  Lumbar spine  Pain quality:  Aching (throbbing)  Radiates to: to bilateral legs L > R   Pain-numeric:  8/10 and 9/10  Aggravated by:  Standing, sitting, twisting, bending, lying down and position " (walking long distances, weather changes)  Stiffness is present:  All day  Associated symptoms: numbness (hands) and weakness (body weekness)    Associated symptoms: no abdominal pain, no chest pain, no dysuria, no fever and no headaches    Treatments tried:  Heat and analgesics (rest/reposition, PT in the past (this caused increased pain))  Improvement on treatment:  Mild  Joint Pain  Symptoms are chronic (bilateral hands, left shoulder, bilateral knees).   Onset was more than 1 year ago.   Symptoms occur daily.   Symptoms have been worse (pain has remained consistent since his last office visi) since onset.   Symptoms include fatigue, numbness (hands) and weakness (body weekness).    Pertinent negative symptoms include no abdominal pain, no chest pain, no chills, no congestion, no cough, no fever, no headaches and no dysuria.   Aggravating factors include walking, standing and bending (weather changes, increased physical activity, ).   Treatments tried include oral narcotics, position changes, rest, heat, lying down and acetaminophen.   Improvement on treatment was moderate.      PEG Assessment   What number best describes your pain on average in the past week?8  What number best describes how, during the past week, pain has interfered with your enjoyment of life?10  What number best describes how, during the past week, pain has interfered with your general activity?  10    Review of Pertinent Medical Data ---  Reviewed office note by Dr. Mukesh Russell with neurology from 3/20/2025.  Patient presents for management of Parkinson's disease and peripheral neuropathy.  Recently admitted to the hospital after experiencing generalized weakness.  Patient also reports he has been experiencing burning sensation with urination.  Sinemet was increased to 4 times daily in the hospital and he was started on entacapone, which caused severe nausea and lethargy.  Medication has since been continued.  Sinemet was increased to  "25/104 times a day at this time.  He was encouraged to continue with physical therapy.  He will return in 3 months.      The following portions of the patient's history were reviewed and updated as appropriate: allergies, current medications, past family history, past medical history, past social history, past surgical history, and problem list.    Review of Systems   Constitutional:  Positive for fatigue. Negative for activity change, chills and fever.   HENT:  Negative for congestion.    Respiratory:  Negative for cough.    Cardiovascular:  Negative for chest pain.   Gastrointestinal:  Negative for abdominal pain, constipation and diarrhea.   Genitourinary:  Negative for dysuria.   Musculoskeletal:  Positive for arthralgias and back pain.   Neurological:  Positive for weakness (body weekness) and numbness (hands). Negative for seizures and headaches.   Psychiatric/Behavioral:  Negative for self-injury, sleep disturbance and suicidal ideas.      I have reviewed and confirmed the accuracy of the ROS as documented by the MA/LPN/RN MY Perez    Vitals:    06/23/25 1315   BP: 131/69   Pulse: 61   Resp: 18   Temp: 96.9 °F (36.1 °C)   SpO2: 98%   Weight: 58.5 kg (129 lb)   Height: 172.7 cm (67.99\")   PainSc: 9      Objective   Physical Exam  Constitutional:       Appearance: Normal appearance.   HENT:      Head: Normocephalic.   Cardiovascular:      Rate and Rhythm: Normal rate and regular rhythm.   Pulmonary:      Effort: Pulmonary effort is normal.      Breath sounds: Decreased air movement present.   Musculoskeletal:      Cervical back: Normal range of motion.      Lumbar back: Tenderness present. Decreased range of motion. Positive right straight leg raise test and positive left straight leg raise test.      Comments: Diffuse arthritic changes   Skin:     General: Skin is warm and dry.      Capillary Refill: Capillary refill takes less than 2 seconds.   Neurological:      General: No focal deficit " present.      Mental Status: He is alert and oriented to person, place, and time.      Gait: Gait abnormal (slowed, ambulates with walker).   Psychiatric:         Mood and Affect: Mood normal.         Behavior: Behavior normal.         Thought Content: Thought content normal.         Cognition and Memory: Cognition normal.       Assessment & Plan   Diagnoses and all orders for this visit:    1. Arthralgia of multiple joints (Primary)  -     POC Urine Drug Screen, Triage  -     Urine Drug Screen Confirmation - Urine, Clean Catch; Future    2. 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 Chronic pain of both hips    4. Lumbar facet arthropathy  -     HYDROcodone-acetaminophen (NORCO) 7.5-325 MG per tablet; Take 1 tablet by mouth Every 8 (Eight) Hours As Needed for Moderate Pain. 30 day supply. DNF 7/3/25  Dispense: 90 tablet; Refill: 0    5. Chronic bilateral low back pain without sciatica  -     HYDROcodone-acetaminophen (NORCO) 7.5-325 MG per tablet; Take 1 tablet by mouth Every 8 (Eight) Hours As Needed for Moderate Pain. 30 day supply. DNF 7/3/25  Dispense: 90 tablet; Refill: 0    6. Encounter for long-term (current) use of high-risk medication      Madhu Santoro reports a pain score of 9.  Given his pain assessment as noted, treatment options were discussed and the following options were decided upon as a follow-up plan to address the patient's pain: continuation of current treatment plan for pain and prescription for opiod analgesics.    --- Routine UDS in office today as part of monitoring requirements for controlled substances.  The specimen was viewed and the immunoassay result reviewed and is +OPI.  This specimen will be sent to [facility] for confirmation.     --- The patient signed an updated copy of the controlled substance agreement on  6/23/25  --- ORT performed on 6/24/24 -- low risk  --- Continue Hydrocodone. Will increase to 7.5mg to see if this is more beneficial to his pain and allow for functional improvement. Patient appears stable with current regimen. No adverse effects. Regarding continuation of opioids, there is no evidence of aberrant behavior or any red flags.  The patient continues with appropriate response to opioid therapy. ADL's remain intact by self.   --- Follow-up 5 weeks to assess medication effectiveness      RUTH REPORT  As part of the patient's treatment plan, I am prescribing controlled substances. The patient has been made aware of appropriate use of such medications, including potential risk of somnolence, limited ability to drive and/or work safely, and the potential for dependence or overdose. It has also been made clear that these medications are for use by this patient only, without concomitant use of alcohol or other substances unless prescribed.     Patient has completed prescribing agreement detailing terms of continued prescribing of controlled substances, including monitoring RUTH reports, urine drug screening, and pill counts if necessary. The patient is aware that inappropriate use will results in cessation of prescribing such medications.    As the clinician, I personally reviewed the RUTH from 6/23/25 while the patient was in the office today.    History and physical exam exhibit continued safe and appropriate use of controlled substances.    Dictated utilizing Dragon dictation.

## 2025-06-23 NOTE — TELEPHONE ENCOUNTER
Caller: MaudeMadhu    Relationship: Self    Best call back number: [phone]      Requested Prescriptions:   Requested Prescriptions     Pending Prescriptions Disp Refills    rosuvastatin (CRESTOR) 20 MG tablet 90 tablet 0     Sig: Take 1 tablet by mouth every night at bedtime.        Pharmacy where request should be sent: [facility] - [place], [place] - [address] - [phone] PH - [phone] FX     Last office visit with prescribing clinician: 11/14/2024   Last telemedicine visit with prescribing clinician: Visit date not found   Next office visit with prescribing clinician: Visit date not found     Additional details provided by patient:     PATIENT IS CALLING TO REQUEST REFILL FOR CRESTOR. PATIENT STATES HE TAKES 1 TABLET TWICE A DAY. PATIENT STATES HE HAS 5 TABLET REMAINING.    Does the patient have less than a 3 day supply:  [x] Yes  [] No      Daniela Hernandez Rep   06/23/25 10:50 EDT

## 2025-06-24 DIAGNOSIS — M10.9 GOUT, UNSPECIFIED CAUSE, UNSPECIFIED CHRONICITY, UNSPECIFIED SITE: Chronic | ICD-10-CM

## 2025-06-24 RX ORDER — ALLOPURINOL 300 MG/1
TABLET ORAL
Qty: 14 TABLET | Refills: 0 | Status: SHIPPED | OUTPATIENT
Start: 2025-06-24

## 2025-06-26 ENCOUNTER — OFFICE VISIT (OUTPATIENT)
Dept: NEUROLOGY | Facility: CLINIC | Age: 80
End: 2025-06-26
Payer: MEDICARE

## 2025-06-26 VITALS
DIASTOLIC BLOOD PRESSURE: 56 MMHG | OXYGEN SATURATION: 95 % | SYSTOLIC BLOOD PRESSURE: 124 MMHG | HEART RATE: 60 BPM | WEIGHT: 130 LBS | BODY MASS INDEX: 19.77 KG/M2

## 2025-06-26 DIAGNOSIS — G20.A1 PARKINSON'S DISEASE WITHOUT DYSKINESIA OR FLUCTUATING MANIFESTATIONS: ICD-10-CM

## 2025-06-26 DIAGNOSIS — G60.9 PERIPHERAL NEUROPATHY, IDIOPATHIC: Primary | ICD-10-CM

## 2025-06-26 RX ORDER — CARBIDOPA/LEVODOPA 25MG-250MG
1 TABLET ORAL 4 TIMES DAILY
Qty: 120 TABLET | Refills: 4 | Status: SHIPPED | OUTPATIENT
Start: 2025-06-26 | End: 2025-07-26

## 2025-06-26 RX ORDER — GABAPENTIN 100 MG/1
100 CAPSULE ORAL 2 TIMES DAILY
Qty: 60 CAPSULE | Refills: 1 | Status: SHIPPED | OUTPATIENT
Start: 2025-06-26 | End: 2025-07-26

## 2025-06-26 NOTE — PROGRESS NOTES
Chief Complaint   Patient presents with    Peripheral neuropathy, idiopathic       Patient ID: Madhu Santoro is a 79 y.o. male.    HPI:  I had the pleasure of seeing your patient today.  As you may know he is an 79-year-old gentleman here for the management of Parkinson's disease and peripheral neuropathy.  He continues to have significant gait and balance impairment from the peripheral neuropathy.  He will occasionally experience significant slowing of his movements throughout the day.  He also has episodes of freezing or moments where he is unable to initiate his gait.  No significant resting tremor in the hands since last follow-up.  He has not had any hallucinations.  He does have significant slowing of movements today.  He has not had any recent falls.  He has discontinued physical therapy.  That did help with respect to gait and balance.     The following portions of the patient's history were reviewed and updated as appropriate: allergies, current medications, past family history, past medical history, past social history, past surgical history and problem list.    Review of Systems   Musculoskeletal:  Positive for gait problem.   Neurological:  Positive for weakness and numbness. Negative for dizziness, tremors, seizures, syncope, facial asymmetry, speech difficulty, light-headedness and headaches.   Psychiatric/Behavioral:  Negative for agitation, behavioral problems, confusion, decreased concentration, dysphoric mood, hallucinations, self-injury, sleep disturbance and suicidal ideas. The patient is not nervous/anxious and is not hyperactive.       I have reviewed the review of systems above performed by my medical assistant.      Vitals:    06/26/25 1431   BP: 124/56   Pulse: 60   SpO2: 95%       Neurological Exam  Mental Status  Awake, alert and oriented to person, place and time. Recent and remote memory are intact. Speech is normal. Language is fluent with no aphasia. Attention and concentration are  normal. Fund of knowledge is appropriate for level of education.    Cranial Nerves  CN I: Sense of smell is normal.  CN II: Visual acuity is normal.  CN III, IV, VI: Extraocular movements intact bilaterally. Pupils equal round and reactive to light bilaterally.  CN V: Facial sensation is normal.  CN VII: Full and symmetric facial movement.  CN XI: Shoulder shrug strength is normal.  CN XII: Tongue midline without atrophy or fasciculations.    Motor  Normal muscle bulk throughout. No fasciculations present. Normal muscle tone. No abnormal involuntary movements. No pronator drift.                                             Right                     Left  Rhomboids                            5                          5  Infraspinatus                          5                          5  Supraspinatus                       5                          5  Deltoid                                   5                          5   Biceps                                   5                          5  Brachioradialis                      5                          5   Triceps                                  5                          5   Pronator                                5                          5   Supinator                              5                           5   Wrist flexor                            5                          5   Wrist extensor                       5                          5   Finger flexor                          5                          5   Finger extensor                     5                          5   Interossei                              5                          5   Abductor pollicis brevis         5                          5   Flexor pollicis brevis             5                          5   Opponens pollicis                 5                          5  Extensor digitorum               5                          5  Abductor digiti minimi           5                           5   Abdominal                            5                          5  Glutei                                    5                          5  Hip abductor                         5                          5  Hip adductor                         5                          5   Iliopsoas                               5                          5   Quadriceps                           5                          5   Hamstring                             5                          5   Gastrocnemius                     5                           5   Anterior tibialis                      5                          5   Posterior tibialis                    5                          5   Peroneal                               5                          5  Ankle dorsiflexor                   5                          5  Ankle plantar flexor              5                           5  Extensor hallucis longus      5                           5    Sensory  Light touch abnormality: Vibration abnormality:     Reflexes                                            Right                      Left  Patellar                                0                         0  Achilles                                0                         0    Right pathological reflexes: Jamila's absent.  Left pathological reflexes: Jamila's absent.    Coordination    Finger-to-nose, rapid alternating movements and heel-to-shin normal bilaterally without dysmetria.    Gait  Casual gait: Wide stance. Reduced stride length. Ataxic gait. Reduced right arm swing. Reduced left arm swing. Unable to rise from chair without using arms.       Physical Exam  Vitals reviewed.   Constitutional:       Appearance: He is well-developed.   HENT:      Head: Normocephalic and atraumatic.   Eyes:      Extraocular Movements: Extraocular movements intact.      Pupils: Pupils are equal, round, and reactive to light.   Cardiovascular:      Rate and Rhythm: Normal rate  and regular rhythm.   Pulmonary:      Breath sounds: Normal breath sounds.   Musculoskeletal:         General: Normal range of motion.   Skin:     General: Skin is warm.   Neurological:      Coordination: Coordination is intact.      Deep Tendon Reflexes:      Reflex Scores:       Patellar reflexes are 0 on the right side and 0 on the left side.       Achilles reflexes are 0 on the right side and 0 on the left side.  Psychiatric:         Speech: Speech normal.         Procedures    Assessment/Plan: I would like to refer him for more physical therapy along with speech therapy.  We will start his gabapentin back at 100 mg twice daily.  Increase Sinemet to 25/250 4 times daily.  Return to clinic in 4 months or sooner if needed.  A total of 45 minutes was spent face-to-face with the patient today.  Of that greater than 50% of this time was spent discussing signs and symptoms of those stated below, patient education, plan of care and prognosis.         Diagnoses and all orders for this visit:    1. Peripheral neuropathy, idiopathic (Primary)  -     Ambulatory Referral to Physical Therapy for Evaluation & Treatment  -     gabapentin (NEURONTIN) 100 MG capsule; Take 1 capsule by mouth 2 (Two) Times a Day for 30 days.  Dispense: 60 capsule; Refill: 1    2. Parkinson's disease without dyskinesia or fluctuating manifestations  -     Ambulatory Referral to Physical Therapy for Evaluation & Treatment  -     Ambulatory Referral to Speech Therapy for Evaluation & Treatment  -     carbidopa-levodopa (SINEMET)  MG per tablet; Take 1 tablet by mouth 4 (Four) Times a Day for 30 days.  Dispense: 120 tablet; Refill: 4           Mukesh Russell II, MD

## 2025-07-09 DIAGNOSIS — M10.9 GOUT, UNSPECIFIED CAUSE, UNSPECIFIED CHRONICITY, UNSPECIFIED SITE: Chronic | ICD-10-CM

## 2025-07-09 RX ORDER — ALLOPURINOL 300 MG/1
TABLET ORAL
Qty: 14 TABLET | Refills: 0 | OUTPATIENT
Start: 2025-07-09

## 2025-07-11 DIAGNOSIS — M10.9 GOUT, UNSPECIFIED CAUSE, UNSPECIFIED CHRONICITY, UNSPECIFIED SITE: Chronic | ICD-10-CM

## 2025-07-11 RX ORDER — ALLOPURINOL 300 MG/1
TABLET ORAL
Qty: 14 TABLET | Refills: 0 | Status: SHIPPED | OUTPATIENT
Start: 2025-07-11 | End: 2025-07-14 | Stop reason: SDUPTHER

## 2025-07-14 ENCOUNTER — OFFICE VISIT (OUTPATIENT)
Dept: FAMILY MEDICINE CLINIC | Facility: CLINIC | Age: 80
End: 2025-07-14
Payer: MEDICARE

## 2025-07-14 VITALS
HEART RATE: 86 BPM | TEMPERATURE: 98.6 F | DIASTOLIC BLOOD PRESSURE: 56 MMHG | BODY MASS INDEX: 19.79 KG/M2 | SYSTOLIC BLOOD PRESSURE: 110 MMHG | OXYGEN SATURATION: 98 % | WEIGHT: 130.6 LBS | HEIGHT: 68 IN

## 2025-07-14 DIAGNOSIS — Z79.01 CHRONIC ANTICOAGULATION: ICD-10-CM

## 2025-07-14 DIAGNOSIS — Z12.5 SCREENING FOR PROSTATE CANCER: ICD-10-CM

## 2025-07-14 DIAGNOSIS — I25.83 CORONARY ARTERY DISEASE DUE TO LIPID RICH PLAQUE: ICD-10-CM

## 2025-07-14 DIAGNOSIS — E78.5 HYPERLIPIDEMIA, UNSPECIFIED HYPERLIPIDEMIA TYPE: ICD-10-CM

## 2025-07-14 DIAGNOSIS — M10.9 GOUT, UNSPECIFIED CAUSE, UNSPECIFIED CHRONICITY, UNSPECIFIED SITE: ICD-10-CM

## 2025-07-14 DIAGNOSIS — I10 BENIGN ESSENTIAL HYPERTENSION: ICD-10-CM

## 2025-07-14 DIAGNOSIS — I25.10 CORONARY ARTERY DISEASE DUE TO LIPID RICH PLAQUE: ICD-10-CM

## 2025-07-14 DIAGNOSIS — I48.3 TYPICAL ATRIAL FLUTTER: Primary | ICD-10-CM

## 2025-07-14 PROCEDURE — 1160F RVW MEDS BY RX/DR IN RCRD: CPT | Performed by: NURSE PRACTITIONER

## 2025-07-14 PROCEDURE — 1159F MED LIST DOCD IN RCRD: CPT | Performed by: NURSE PRACTITIONER

## 2025-07-14 PROCEDURE — 3074F SYST BP LT 130 MM HG: CPT | Performed by: NURSE PRACTITIONER

## 2025-07-14 PROCEDURE — 1126F AMNT PAIN NOTED NONE PRSNT: CPT | Performed by: NURSE PRACTITIONER

## 2025-07-14 PROCEDURE — 3078F DIAST BP <80 MM HG: CPT | Performed by: NURSE PRACTITIONER

## 2025-07-14 PROCEDURE — 99214 OFFICE O/P EST MOD 30 MIN: CPT | Performed by: NURSE PRACTITIONER

## 2025-07-14 RX ORDER — ALLOPURINOL 300 MG/1
300 TABLET ORAL DAILY
Qty: 90 TABLET | Refills: 3 | Status: SHIPPED | OUTPATIENT
Start: 2025-07-14

## 2025-07-14 RX ORDER — ROSUVASTATIN CALCIUM 20 MG/1
20 TABLET, COATED ORAL
Qty: 90 TABLET | Refills: 3 | Status: SHIPPED | OUTPATIENT
Start: 2025-07-14

## 2025-07-14 NOTE — PROGRESS NOTES
"Subjective   Madhu Santoro is a 79 y.o. male.   His wife accompanied him to visit.   History of Present Illness    Since the last visit, he has overall felt fairly well.  He has Atrial Fibillation and remains under the care of their cardiologist for management, CAD and remains under care of their Cardiologist for mangement, and history of gout that is well controlled on allopurinol.  he has been compliant with current medications have reviewed them.  The patient denies medication side effects.  Will refill medications. /56   Pulse 86   Temp 98.6 °F (37 °C) (Temporal)   Ht 172.7 cm (67.99\")   Wt 59.2 kg (130 lb 9.6 oz)   SpO2 98%   BMI 19.86 kg/m² .  BMI is within normal parameters. No other follow-up for BMI required.      Results for orders placed or performed in visit on 06/23/25   POC Urine Drug Screen, Triage    Collection Time: 06/23/25  2:10 PM    Specimen: Urine   Result Value Ref Range    Methamphetaine Screen, Urine Negative Negative    POC Amphetamines Negative Negative    Barbiturates Screen Negative Negative    Benzodiazepine Screen Negative Negative    Cocaine Screen Negative Negative    Methadone Screen Negative Negative    Opiate Screen Positive Negative    Oxycodone, Screen Negative Negative    Phencyclidine (PCP) Screen Negative Negative    Propoxyphene Screen Negative Negative    THC, Screen Negative Negative    Tricyclic Antidepressants Screen Negative Negative    Buprenorphine Screen Negative      *Note: Due to a large number of results and/or encounters for the requested time period, some results have not been displayed. A complete set of results can be found in Results Review.     He has appt coming up with hematology for labs related to polycythemia vera.      Patient and wife request printed RX for Eliquis to send off to pharmacy to get at cheaper cost.      The following portions of the patient's history were reviewed and updated as appropriate: allergies, current medications, " past family history, past medical history, past social history, past surgical history, and problem list.    Review of Systems   Constitutional:  Negative for unexpected weight change.   Cardiovascular:  Negative for palpitations.   Skin:  Negative for rash.   Psychiatric/Behavioral:  Negative for confusion.        Objective   Physical Exam  Vitals and nursing note reviewed.   Constitutional:       Appearance: He is well-developed.   Cardiovascular:      Rate and Rhythm: Normal rate and regular rhythm.   Pulmonary:      Effort: Pulmonary effort is normal.      Breath sounds: Normal breath sounds.   Neurological:      Mental Status: He is alert and oriented to person, place, and time.   Psychiatric:         Mood and Affect: Mood normal.         Behavior: Behavior normal.         Thought Content: Thought content normal.         Judgment: Judgment normal.     Regular rate and rhythm.     Assessment & Plan   Diagnoses and all orders for this visit:    1. Typical atrial flutter (Primary)  -     Lipid panel  -     Uric acid  -     apixaban (ELIQUIS) 2.5 MG tablet tablet; Take 1 tablet by mouth Every 12 (Twelve) Hours. Indications: Atrial Fibrillation  Dispense: 200 tablet; Refill: 3    2. Coronary artery disease due to lipid rich plaque  -     Lipid panel  -     Uric acid    3. Benign essential hypertension  -     Lipid panel  -     Uric acid    4. Chronic anticoagulation  -     Lipid panel  -     Uric acid    5. Gout, unspecified cause, unspecified chronicity, unspecified site  -     Lipid panel  -     Uric acid  -     allopurinol (ZYLOPRIM) 300 MG tablet; Take 1 tablet by mouth Daily.  Dispense: 90 tablet; Refill: 3    6. Hyperlipidemia, unspecified hyperlipidemia type  -     Lipid panel  -     Uric acid  -     rosuvastatin (CRESTOR) 20 MG tablet; Take 1 tablet by mouth every night at bedtime.  Dispense: 90 tablet; Refill: 3    7. Screening for prostate cancer    Other orders  -     apixaban (ELIQUIS) 2.5 MG tablet  tablet; Take 1 tablet by mouth 2 (Two) Times a Day.  Dispense: 56 tablet; Refill: 0          Gave printed rx for eliquis as well as samples.    Will get uric acid and lipid panel when he gets labs with hematology.

## 2025-07-30 ENCOUNTER — LAB (OUTPATIENT)
Dept: LAB | Facility: HOSPITAL | Age: 80
End: 2025-07-30
Payer: MEDICARE

## 2025-07-30 DIAGNOSIS — D45 POLYCYTHEMIA VERA: ICD-10-CM

## 2025-07-30 DIAGNOSIS — G60.9 PERIPHERAL NEUROPATHY, IDIOPATHIC: ICD-10-CM

## 2025-07-30 LAB
BASOPHILS # BLD AUTO: 0.03 10*3/MM3 (ref 0–0.2)
BASOPHILS NFR BLD AUTO: 0.4 % (ref 0–1.5)
DEPRECATED RDW RBC AUTO: 70.9 FL (ref 37–54)
EOSINOPHIL # BLD AUTO: 0.11 10*3/MM3 (ref 0–0.4)
EOSINOPHIL NFR BLD AUTO: 1.3 % (ref 0.3–6.2)
ERYTHROCYTE [DISTWIDTH] IN BLOOD BY AUTOMATED COUNT: 18.6 % (ref 12.3–15.4)
HCT VFR BLD AUTO: 51.9 % (ref 37.5–51)
HGB BLD-MCNC: 16.1 G/DL (ref 13–17.7)
IMM GRANULOCYTES # BLD AUTO: 0.11 10*3/MM3 (ref 0–0.05)
IMM GRANULOCYTES NFR BLD AUTO: 1.3 % (ref 0–0.5)
LYMPHOCYTES # BLD AUTO: 0.99 10*3/MM3 (ref 0.7–3.1)
LYMPHOCYTES NFR BLD AUTO: 11.8 % (ref 19.6–45.3)
MCH RBC QN AUTO: 32.6 PG (ref 26.6–33)
MCHC RBC AUTO-ENTMCNC: 31 G/DL (ref 31.5–35.7)
MCV RBC AUTO: 105.1 FL (ref 79–97)
MONOCYTES # BLD AUTO: 0.71 10*3/MM3 (ref 0.1–0.9)
MONOCYTES NFR BLD AUTO: 8.4 % (ref 5–12)
NEUTROPHILS NFR BLD AUTO: 6.46 10*3/MM3 (ref 1.7–7)
NEUTROPHILS NFR BLD AUTO: 76.8 % (ref 42.7–76)
NRBC BLD AUTO-RTO: 0 /100 WBC (ref 0–0.2)
PLATELET # BLD AUTO: 279 10*3/MM3 (ref 140–450)
PMV BLD AUTO: 9.9 FL (ref 6–12)
RBC # BLD AUTO: 4.94 10*6/MM3 (ref 4.14–5.8)
WBC NRBC COR # BLD AUTO: 8.41 10*3/MM3 (ref 3.4–10.8)

## 2025-07-30 PROCEDURE — 85025 COMPLETE CBC W/AUTO DIFF WBC: CPT

## 2025-07-30 PROCEDURE — 36415 COLL VENOUS BLD VENIPUNCTURE: CPT

## 2025-07-30 RX ORDER — GABAPENTIN 100 MG/1
100 CAPSULE ORAL 2 TIMES DAILY
Qty: 60 CAPSULE | Refills: 1 | Status: SHIPPED | OUTPATIENT
Start: 2025-07-30

## 2025-07-31 ENCOUNTER — OFFICE VISIT (OUTPATIENT)
Dept: PAIN MEDICINE | Facility: CLINIC | Age: 80
End: 2025-07-31
Payer: MEDICARE

## 2025-07-31 VITALS
DIASTOLIC BLOOD PRESSURE: 66 MMHG | RESPIRATION RATE: 18 BRPM | TEMPERATURE: 97.9 F | HEIGHT: 68 IN | OXYGEN SATURATION: 97 % | SYSTOLIC BLOOD PRESSURE: 122 MMHG | BODY MASS INDEX: 19.94 KG/M2 | HEART RATE: 61 BPM | WEIGHT: 131.6 LBS

## 2025-07-31 DIAGNOSIS — G89.29 CHRONIC BILATERAL LOW BACK PAIN WITHOUT SCIATICA: ICD-10-CM

## 2025-07-31 DIAGNOSIS — M51.370 DEGENERATION OF INTERVERTEBRAL DISC OF LUMBOSACRAL REGION WITH DISCOGENIC BACK PAIN: Primary | ICD-10-CM

## 2025-07-31 DIAGNOSIS — M25.551 CHRONIC PAIN OF BOTH HIPS: ICD-10-CM

## 2025-07-31 DIAGNOSIS — M25.552 CHRONIC PAIN OF BOTH HIPS: ICD-10-CM

## 2025-07-31 DIAGNOSIS — G89.29 CHRONIC PAIN OF BOTH HIPS: ICD-10-CM

## 2025-07-31 DIAGNOSIS — M54.50 CHRONIC BILATERAL LOW BACK PAIN WITHOUT SCIATICA: ICD-10-CM

## 2025-07-31 DIAGNOSIS — Z79.899 ENCOUNTER FOR LONG-TERM (CURRENT) USE OF HIGH-RISK MEDICATION: ICD-10-CM

## 2025-07-31 DIAGNOSIS — M25.50 ARTHRALGIA OF MULTIPLE JOINTS: ICD-10-CM

## 2025-07-31 DIAGNOSIS — M47.816 LUMBAR FACET ARTHROPATHY: ICD-10-CM

## 2025-07-31 LAB
CHOLEST SERPL-MCNC: 112 MG/DL (ref 100–199)
HDLC SERPL-MCNC: 33 MG/DL
LDLC SERPL CALC-MCNC: 59 MG/DL (ref 0–99)
TRIGL SERPL-MCNC: 107 MG/DL (ref 0–149)
URATE SERPL-MCNC: 3.1 MG/DL (ref 3.8–8.4)
VLDLC SERPL CALC-MCNC: 20 MG/DL (ref 5–40)

## 2025-07-31 RX ORDER — HYDROCODONE BITARTRATE AND ACETAMINOPHEN 7.5; 325 MG/1; MG/1
1 TABLET ORAL EVERY 8 HOURS PRN
Qty: 90 TABLET | Refills: 0 | Status: SHIPPED | OUTPATIENT
Start: 2025-07-31

## 2025-07-31 NOTE — PROGRESS NOTES
CHIEF COMPLAINT  Back and joint pain     Subjective   Madhu Santoro is a 79 y.o. male  who presents for follow-up.  He has a history of chronic back and joint pain. He reports that his pain has remained consistent since his last office visit.     Today pain is 8/10VAS in severity (no acute distress noted at this time). He complains today of low back that radiates down right lateral leg. Pain is also located throughout multiple joints (hands, feet, knees). He notes numbness in bilateral feet. Describes this pain as a nearly continuous aching, burning, and throbbing. Pain is worsened by prolonged walking or standing, increased physical activity, and bending/twisitng. Pain improves with rest/reposition, use of a heating pad, and medication.      Hydrocodone was increased to 7.5mg TID PRN at his office visit. He reports that this medication increase has been somewhat helpful to his pain and allows him to be more active/rest better at night. Denies any side side effects from the regimen including somnolence and constipation. ADL's by self. Denies any bowel or bladder changes. He takes Allopurinol for gout (Dr. Sanchez) and Sinemet (MY Geller). He is no longer taking Elavil.      Not a candidate for NSAIDs - history of TIA's and remains on Eliquis (prescribed by Dr. Sanchez)     Unable to tolerate Pregabalin - caused BLE swelling    He was seen by Dr. Russell with Neurology on 6/26/25. Reviewed office note from this date. Gabapentin was restarted back at 100mg BID. Sinemet was increased to 25/250 QID. He states he has not received his Gabapentin yet due to a prescribing issue. He hopes to start this medication soon.     Back Pain  Chronicity:  Chronic  Onset:  More than 1 year ago  Frequency:  Constantly  Progression since onset:  Unchanged  Pain location:  Lumbar spine  Pain quality:  Aching (throbbing)  Radiates to: to bilateral legs L > R   Pain-numeric:  8/10  Aggravated by:  Standing, sitting, twisting,  bending, lying down and position (walking long distances, weather changes)  Stiffness is present:  All day  Associated symptoms: numbness and weakness    Associated symptoms: no abdominal pain, no chest pain, no dysuria, no fever and no headaches    Treatments tried:  Heat and analgesics (rest/reposition, PT in the past (this caused increased pain))  Improvement on treatment:  Mild  Joint Pain  Chronicity:  Chronic (bilateral hands, left shoulder, bilateral knees)  Onset:  More than 1 year ago  Frequency:  Daily  Progression since onset:  Unchanged (pain has remained consistent since his last office visi)  Symptoms: fatigue, numbness and weakness    Symptoms: no abdominal pain, no chest pain, no chills, no congestion, no cough, no fever, no headaches and no dysuria    Aggravating factors:  Walking, standing and bending (weather changes, increased physical activity, )  Treatments tried:  Oral narcotics, position changes, rest, heat, lying down and acetaminophen  Improvement on treatment:  Moderate    PEG Assessment   What number best describes your pain on average in the past week?8  What number best describes how, during the past week, pain has interfered with your enjoyment of life?5  What number best describes how, during the past week, pain has interfered with your general activity?  8    Review of Pertinent Medical Data ---      The following portions of the patient's history were reviewed and updated as appropriate: allergies, current medications, past family history, past medical history, past social history, past surgical history, and problem list.    Review of Systems   Constitutional:  Positive for fatigue. Negative for chills and fever.   HENT:  Negative for congestion.    Respiratory:  Negative for cough.    Cardiovascular:  Negative for chest pain.   Gastrointestinal:  Negative for abdominal pain, constipation and diarrhea.   Genitourinary:  Negative for difficulty urinating and dysuria.  "  Musculoskeletal:  Positive for arthralgias and back pain.   Neurological:  Positive for weakness and numbness. Negative for headaches.   Psychiatric/Behavioral:  Negative for sleep disturbance and suicidal ideas. The patient is not nervous/anxious.      I have reviewed and confirmed the accuracy of the ROS as documented by the MA/LPN/RN MY Perez    Vitals:    07/31/25 1327   BP: 122/66   Pulse: 61   Resp: 18   Temp: 97.9 °F (36.6 °C)   SpO2: 97%   Weight: 59.7 kg (131 lb 9.6 oz)   Height: 172.7 cm (67.99\")   PainSc: 8    PainLoc: Back     Objective   Physical Exam  Constitutional:       Appearance: Normal appearance.   HENT:      Head: Normocephalic.   Cardiovascular:      Rate and Rhythm: Normal rate and regular rhythm.   Pulmonary:      Effort: Pulmonary effort is normal.      Breath sounds: Decreased air movement present.   Musculoskeletal:      Cervical back: Normal range of motion.      Lumbar back: Tenderness present. Decreased range of motion. Positive right straight leg raise test and positive left straight leg raise test.      Comments: Diffuse arthritic changes   Skin:     General: Skin is warm and dry.      Capillary Refill: Capillary refill takes less than 2 seconds.   Neurological:      General: No focal deficit present.      Mental Status: He is alert and oriented to person, place, and time.      Gait: Gait abnormal (slowed, ambulates with walker).   Psychiatric:         Mood and Affect: Mood normal.         Behavior: Behavior normal.         Thought Content: Thought content normal.         Cognition and Memory: Cognition normal.       Assessment & Plan   Diagnoses and all orders for this visit:    1. Degeneration of intervertebral disc of lumbosacral region with discogenic back pain (Primary)    2. Lumbar facet arthropathy  -     HYDROcodone-acetaminophen (NORCO) 7.5-325 MG per tablet; Take 1 tablet by mouth Every 8 (Eight) Hours As Needed for Moderate Pain. 30 day supply. DNF 8/2/25  " Dispense: 90 tablet; Refill: 0    3. Chronic bilateral low back pain without sciatica  -     HYDROcodone-acetaminophen (NORCO) 7.5-325 MG per tablet; Take 1 tablet by mouth Every 8 (Eight) Hours As Needed for Moderate Pain. 30 day supply. DNF 8/2/25  Dispense: 90 tablet; Refill: 0    4. Arthralgia of multiple joints    5. Chronic pain of both hips    6. Encounter for long-term (current) use of high-risk medication      Madhu Santoro reports a pain score of 8.  Given his pain assessment as noted, treatment options were discussed and the following options were decided upon as a follow-up plan to address the patient's pain: continuation of current treatment plan for pain and prescription for opiod analgesics.    --- The urine drug screen confirmation from 6/23/25 has been reviewed and the result is appropriate based on patient history and RUTH report  --- The patient signed an updated copy of the controlled substance agreement on 6/23/25  --- ORT performed on 6/24/24 -- low risk  --- Continue Hydrocodone. DNF 8/2/25. Patient appears stable with current regimen. No adverse effects. Regarding continuation of opioids, there is no evidence of aberrant behavior or any red flags.  The patient continues with appropriate response to opioid therapy. ADL's remain intact by self.   --- Follow-up 3 months or sooner if needed      RUTH REPORT  As part of the patient's treatment plan, I am prescribing controlled substances. The patient has been made aware of appropriate use of such medications, including potential risk of somnolence, limited ability to drive and/or work safely, and the potential for dependence or overdose. It has also been made clear that these medications are for use by this patient only, without concomitant use of alcohol or other substances unless prescribed.     Patient has completed prescribing agreement detailing terms of continued prescribing of controlled substances, including monitoring RUTH  reports, urine drug screening, and pill counts if necessary. The patient is aware that inappropriate use will results in cessation of prescribing such medications.    As the clinician, I personally reviewed the RUTH from 7/31/25 while the patient was in the office today.    History and physical exam exhibit continued safe and appropriate use of controlled substances.    Dictated utilizing Dragon dictation.

## 2025-08-12 ENCOUNTER — OFFICE VISIT (OUTPATIENT)
Dept: CARDIOLOGY | Facility: CLINIC | Age: 80
End: 2025-08-12
Payer: MEDICARE

## 2025-08-12 VITALS
DIASTOLIC BLOOD PRESSURE: 60 MMHG | BODY MASS INDEX: 19.7 KG/M2 | HEIGHT: 68 IN | HEART RATE: 73 BPM | SYSTOLIC BLOOD PRESSURE: 126 MMHG | WEIGHT: 130 LBS | OXYGEN SATURATION: 98 %

## 2025-08-12 DIAGNOSIS — I63.412 CEREBROVASCULAR ACCIDENT (CVA) DUE TO EMBOLISM OF LEFT MIDDLE CEREBRAL ARTERY: ICD-10-CM

## 2025-08-12 DIAGNOSIS — I95.1 ORTHOSTATIC HYPOTENSION: ICD-10-CM

## 2025-08-12 DIAGNOSIS — I48.3 TYPICAL ATRIAL FLUTTER: Primary | ICD-10-CM

## 2025-08-14 RX ORDER — HYDROXYUREA 500 MG/1
500 CAPSULE ORAL DAILY
Qty: 90 CAPSULE | Refills: 1 | Status: SHIPPED | OUTPATIENT
Start: 2025-08-14

## 2025-08-25 DIAGNOSIS — G89.29 CHRONIC BILATERAL LOW BACK PAIN WITHOUT SCIATICA: ICD-10-CM

## 2025-08-25 DIAGNOSIS — M47.816 LUMBAR FACET ARTHROPATHY: ICD-10-CM

## 2025-08-25 DIAGNOSIS — M54.50 CHRONIC BILATERAL LOW BACK PAIN WITHOUT SCIATICA: ICD-10-CM

## 2025-08-25 RX ORDER — HYDROCODONE BITARTRATE AND ACETAMINOPHEN 7.5; 325 MG/1; MG/1
1 TABLET ORAL EVERY 8 HOURS PRN
Qty: 90 TABLET | Refills: 0 | Status: SHIPPED | OUTPATIENT
Start: 2025-08-25

## (undated) DEVICE — ENDOPOUCH RETRIEVER SPECIMEN RETRIEVAL BAGS: Brand: ENDOPOUCH RETRIEVER

## (undated) DEVICE — HARMONIC SYNERGY DISSECTING HOOK WITH TORQUE WRENCH. FOR USE WITH BLUE HAND PIECE ONLY: Brand: HARMONIC SYNERGY

## (undated) DEVICE — LOU EP: Brand: MEDLINE INDUSTRIES, INC.

## (undated) DEVICE — ENDOPATH XCEL BLUNT TIP TROCARS WITH SMOOTH SLEEVES: Brand: ENDOPATH XCEL

## (undated) DEVICE — SENSR O2 OXIMAX FNGR A/ 18IN NONSTR

## (undated) DEVICE — DRSNG BRDR MEPILEX P/OP SIL 4X12IN

## (undated) DEVICE — SOL IRR NACL 0.9PCT BT 1000ML

## (undated) DEVICE — CATH IV INSYTE AUTOGARD 14G 1 1/2IN ORNG

## (undated) DEVICE — ANTIBACTERIAL UNDYED BRAIDED (POLYGLACTIN 910), SYNTHETIC ABSORBABLE SUTURE: Brand: COATED VICRYL

## (undated) DEVICE — Device

## (undated) DEVICE — Device: Brand: EZ STEER NAV DS

## (undated) DEVICE — DRSNG WND GEL FIBR OPTICELL AG PLS W/SLV LF 4X5IN  STRL

## (undated) DEVICE — 6 FOOT DISPOSABLE EXTENSION CABLE WITH SAFE CONNECT / SCREW-DOWN

## (undated) DEVICE — ELECTRD ECG CARBON/SNP RL FM A/ 5PK

## (undated) DEVICE — CANNULA,ADULT,SOFT-TOUCH,7'TUBE,UC: Brand: PENDING

## (undated) DEVICE — STPCK 3WY D201 DISCOFIX

## (undated) DEVICE — SOL IRR H2O BTL 1000ML STRL

## (undated) DEVICE — SNAR POLYP SENSATION STDOVL 27 240 BX40

## (undated) DEVICE — DRSNG SURESITE WNDW 2.38X2.75

## (undated) DEVICE — GLV SURG BIOGEL M LTX PF 7 1/2

## (undated) DEVICE — CATH URETRL PA CONE TP 8F

## (undated) DEVICE — PK CATH CARD 40

## (undated) DEVICE — 3M™ BAIR HUGGER® UNDERBODY BLANKET, ADULT, 10 PER CASE 54500: Brand: BAIR HUGGER™

## (undated) DEVICE — INSUFFLATION TUBING,LAPAROSCOPIC: Brand: DEROYAL

## (undated) DEVICE — BNDG ELAS CO-FLEX SLF ADHR 4IN5YD LF STRL

## (undated) DEVICE — VASOVIEW HEMOPRO: Brand: VASOVIEW HEMOPRO

## (undated) DEVICE — KT ORCA ORCAPOD DISP STRL

## (undated) DEVICE — VISUALIZATION SYSTEM: Brand: CLEARIFY

## (undated) DEVICE — BITEBLOCK OMNI BLOC

## (undated) DEVICE — Device: Brand: ISMUS

## (undated) DEVICE — SOL ISO/ALC RUB 70PCT 4OZ

## (undated) DEVICE — SYS PERFUS SEP PLATLT W TIPS CUST

## (undated) DEVICE — SINGLE-USE BIOPSY FORCEPS: Brand: RADIAL JAW 4

## (undated) DEVICE — ROTATING SURGICAL PUNCHES, 1 PER POUCH: Brand: A&E MEDICAL / ROTATING SURGICAL PUNCHES

## (undated) DEVICE — ST. SORBAVIEW ULTIMATE IJ SYSTEM A,C: Brand: CENTURION

## (undated) DEVICE — EXTENSION SET, MALE LUER LOCK ADAPTER WITH RETRACTABLE COLLAR

## (undated) DEVICE — CORONARY ARTERY BYPASS GRAFT MARKERS, STAINLESS STEEL, DISTAL, WITHOUT HOLDER: Brand: ANASTOMARK CORONARY ARTERY BYPASS GRAFT MARKERS, STAINLESS STEEL, DISTAL

## (undated) DEVICE — DRAPE,REIN 53X77,STERILE: Brand: MEDLINE

## (undated) DEVICE — GLV SURG SENSICARE PI MIC PF SZ7.5 LF STRL

## (undated) DEVICE — ADHS SKIN DERMABOND TOP ADVANCED

## (undated) DEVICE — BOOT WAFF HEEL CUST

## (undated) DEVICE — PK PERFUS CUST W/CARDIOPLEGIA

## (undated) DEVICE — PK URETSCP 40

## (undated) DEVICE — CATH DIAG IMPULSE FL3.5 5F 100CM

## (undated) DEVICE — GW EMR FIX EXCHG J STD .035 3MM 260CM

## (undated) DEVICE — SOL NACL 0.9PCT 1000ML

## (undated) DEVICE — TIDISHIELD UROLOGY DRAIN BAGS FROSTY VINYL STERILE FITS SIEMENS UROSKOP ACCESS 20 PER CASE: Brand: TIDISHIELD

## (undated) DEVICE — BLOWER/MISTER AXIOUS OPCAB W/TBG

## (undated) DEVICE — THE SINGLE USE ETRAP – POLYP TRAP IS USED FOR SUCTION RETRIEVAL OF ENDOSCOPICALLY REMOVED POLYPS.: Brand: ETRAP

## (undated) DEVICE — FRCP BX RADJAW4 NDL 2.8 240CM LG OG BX40

## (undated) DEVICE — CATH CHOLANG 4.5F18IN BRGNDY

## (undated) DEVICE — SPNG GZ WOVN 4X4IN 12PLY 10/BX STRL

## (undated) DEVICE — OASIS DRAIN, DUAL, IN-LINE, ATS COMPATIBLE: Brand: OASIS

## (undated) DEVICE — MEDICINE CUP, GRADUATED, STER: Brand: MEDLINE

## (undated) DEVICE — BNDR ABD 4PANEL 12IN MED/LG

## (undated) DEVICE — DRP C/ARM 41X74IN

## (undated) DEVICE — BIOPATCH™ ANTIMICROBIAL DRESSING WITH CHLORHEXIDINE GLUCONATE IS A HYDROPHILLIC POLYURETHANE ABSORPTIVE FOAM WITH CHLORHEXIDINE GLUCONATE (CHG) WHICH INHIBITS BACTERIAL GROWTH UNDER THE DRESSING. THE DRESSING IS INTENDED TO BE USED TO ABSORB EXUDATE, COVER A WOUND CAUSED BY VASCULAR AND NONVASCULAR PERCUTANEOUS MEDICAL DEVICES DURING SURGERY, AS WELL AS REDUCE LOCAL INFECTION AND COLONIZATION OF MICROORGANISMS.: Brand: BIOPATCH

## (undated) DEVICE — DRN WND CH RND FUL/FLUT NO/TROC 3/8IN 28F

## (undated) DEVICE — 32 FR RIGHT ANGLE – SOFT PVC CATHETER: Brand: PVC THORACIC CATHETERS

## (undated) DEVICE — BLAKE CARDIO CONNECTOR 1:1: Brand: J-VAC

## (undated) DEVICE — GLV SURG BIOGEL LTX PF 6 1/2

## (undated) DEVICE — GLV SURG SENSICARE POLYISPRN W/ALOE PF LF 6.5 GRN STRL

## (undated) DEVICE — SHEATH INTRO FASTCATH 0.038GW 8F 12CM

## (undated) DEVICE — CANN O2 ETCO2 FITS ALL CONN CO2 SMPL A/ 7IN DISP LF

## (undated) DEVICE — GW SUREGLIDE FLXTIP STR/TP .035 3X150CM EA/5

## (undated) DEVICE — URETERAL DILATATION SYSTEM

## (undated) DEVICE — PAD GRND REM POLYHESIVE A/ DISP

## (undated) DEVICE — GLIDESHEATH BASIC HYDROPHILIC COATED INTRODUCER SHEATH: Brand: GLIDESHEATH

## (undated) DEVICE — SUT VIC 0/0 UR6 27IN DYED J603H

## (undated) DEVICE — LOU LAP CHOLE: Brand: MEDLINE INDUSTRIES, INC.

## (undated) DEVICE — ENDOCUT SCISSOR TIP, DISPOSABLE: Brand: RENEW

## (undated) DEVICE — BNDG ELAS ELITE V/CLOSE 4IN 5YD LF STRL

## (undated) DEVICE — PRT BIOP SEALS

## (undated) DEVICE — KT PEG ENDOVIVE ENFIT SFTY PUSH 20F 1P/U

## (undated) DEVICE — CANN ART DLP STR/TIP 18F7IN

## (undated) DEVICE — CATH URETRL FLXITP POLLACK STD 5F 70CM

## (undated) DEVICE — HEMOCONCENTRATOR PERFUS LPS06

## (undated) DEVICE — CONTAINER,SPECIMEN,OR STERILE,4OZ: Brand: MEDLINE

## (undated) DEVICE — ADAPT CLN BIOGUARD AIR/H2O DISP

## (undated) DEVICE — ENDOPATH XCEL UNIVERSAL TROCAR STABLILITY SLEEVES: Brand: ENDOPATH XCEL

## (undated) DEVICE — BNDG ELAS CO-FLEX SLF ADHR 6IN 5YD LF STRL

## (undated) DEVICE — SENSR CERBRL O2 PK/2

## (undated) DEVICE — TUBING, SUCTION, 1/4" X 10', STRAIGHT: Brand: MEDLINE

## (undated) DEVICE — PK ATS CUST W CARDIOTOMY RESEVOIR

## (undated) DEVICE — GLV SURG BIOGEL LTX PF 6

## (undated) DEVICE — BNDG ELAS ELITE V/CLOSE 6IN 5YD LF STRL

## (undated) DEVICE — KT MANIFLD CARDIAC

## (undated) DEVICE — Device: Brand: DEFENDO AIR/WATER/SUCTION AND BIOPSY VALVE

## (undated) DEVICE — CATH DIAG IMPULSE FR4 5F 100CM

## (undated) DEVICE — GOWN ,SIRUS,NONREINFORCED SMALL: Brand: MEDLINE

## (undated) DEVICE — CANN ART EOPA 3D NV W/CONN 20F

## (undated) DEVICE — SUT PROLN MO.5 7/0 DBLARM BV175 6 2X30 BX/12

## (undated) DEVICE — PK HEART OPN 40

## (undated) DEVICE — APPL CHLORAPREP W/TINT 26ML ORNG

## (undated) DEVICE — LOU OPEN HEART DR POLLOCK: Brand: MEDLINE INDUSTRIES, INC.

## (undated) DEVICE — PREF.GUIDING SHEATH W/MULT.CRV: Brand: PREFACE

## (undated) DEVICE — CATH VENT MIV RADL PIG ST TIP 5F 110CM

## (undated) DEVICE — Device: Brand: REFERENCE PATCH CARTO 3

## (undated) DEVICE — SNAR POLYP CAPTIVATOR RND STFF 2.4 240CM 10MM 1P/U

## (undated) DEVICE — Device: Brand: WEBSTER CS

## (undated) DEVICE — LN SMPL CO2 SHTRM SD STREAM W/M LUER

## (undated) DEVICE — THE TORRENT IRRIGATION SCOPE CONNECTOR IS USED WITH THE TORRENT IRRIGATION TUBING TO PROVIDE IRRIGATION FLUIDS SUCH AS STERILE WATER DURING GASTROINTESTINAL ENDOSCOPIC PROCEDURES WHEN USED IN CONJUNCTION WITH AN IRRIGATION PUMP (OR ELECTROSURGICAL UNIT).: Brand: TORRENT

## (undated) DEVICE — ENDOPATH XCEL BLADELESS TROCARS WITH STABILITY SLEEVES: Brand: ENDOPATH XCEL

## (undated) DEVICE — CLAMP INSERT: Brand: STEALTH® CLAMP INSERT

## (undated) DEVICE — BLCK/BITE BLOX W/DENTL/RIM W/STRAP 54F